# Patient Record
Sex: MALE | Race: WHITE | Employment: OTHER | URBAN - METROPOLITAN AREA
[De-identification: names, ages, dates, MRNs, and addresses within clinical notes are randomized per-mention and may not be internally consistent; named-entity substitution may affect disease eponyms.]

---

## 2017-01-09 ENCOUNTER — ALLSCRIPTS OFFICE VISIT (OUTPATIENT)
Dept: OTHER | Facility: OTHER | Age: 67
End: 2017-01-09

## 2017-01-11 ENCOUNTER — GENERIC CONVERSION - ENCOUNTER (OUTPATIENT)
Dept: OTHER | Facility: OTHER | Age: 67
End: 2017-01-11

## 2017-02-22 ENCOUNTER — APPOINTMENT (OUTPATIENT)
Dept: LAB | Facility: HOSPITAL | Age: 67
End: 2017-02-22
Payer: COMMERCIAL

## 2017-02-22 ENCOUNTER — GENERIC CONVERSION - ENCOUNTER (OUTPATIENT)
Dept: OTHER | Facility: OTHER | Age: 67
End: 2017-02-22

## 2017-02-22 ENCOUNTER — TRANSCRIBE ORDERS (OUTPATIENT)
Dept: ADMINISTRATIVE | Facility: HOSPITAL | Age: 67
End: 2017-02-22

## 2017-02-22 DIAGNOSIS — K21.9 GASTROESOPHAGEAL REFLUX DISEASE, ESOPHAGITIS PRESENCE NOT SPECIFIED: ICD-10-CM

## 2017-02-22 DIAGNOSIS — K21.9 GASTROESOPHAGEAL REFLUX DISEASE, ESOPHAGITIS PRESENCE NOT SPECIFIED: Primary | ICD-10-CM

## 2017-02-22 PROCEDURE — 82784 ASSAY IGA/IGD/IGG/IGM EACH: CPT

## 2017-02-22 PROCEDURE — 36415 COLL VENOUS BLD VENIPUNCTURE: CPT

## 2017-02-22 PROCEDURE — 86255 FLUORESCENT ANTIBODY SCREEN: CPT

## 2017-02-22 PROCEDURE — 83516 IMMUNOASSAY NONANTIBODY: CPT

## 2017-02-23 LAB
ENDOMYSIUM IGA SER QL: NEGATIVE
GLIADIN PEPTIDE IGA SER-ACNC: 7 UNITS (ref 0–19)
GLIADIN PEPTIDE IGG SER-ACNC: 2 UNITS (ref 0–19)
IGA SERPL-MCNC: 462 MG/DL (ref 61–437)
TTG IGA SER-ACNC: <2 U/ML (ref 0–3)
TTG IGG SER-ACNC: 5 U/ML (ref 0–5)

## 2017-03-08 ENCOUNTER — HOSPITAL ENCOUNTER (EMERGENCY)
Facility: HOSPITAL | Age: 67
Discharge: HOME | End: 2017-03-08
Attending: EMERGENCY MEDICINE | Admitting: EMERGENCY MEDICINE
Payer: COMMERCIAL

## 2017-03-08 ENCOUNTER — APPOINTMENT (EMERGENCY)
Dept: RADIOLOGY | Facility: HOSPITAL | Age: 67
End: 2017-03-08
Payer: COMMERCIAL

## 2017-03-08 ENCOUNTER — APPOINTMENT (EMERGENCY)
Dept: CT IMAGING | Facility: HOSPITAL | Age: 67
End: 2017-03-08
Payer: COMMERCIAL

## 2017-03-08 VITALS
SYSTOLIC BLOOD PRESSURE: 140 MMHG | TEMPERATURE: 98.3 F | HEART RATE: 72 BPM | BODY MASS INDEX: 35.67 KG/M2 | RESPIRATION RATE: 18 BRPM | DIASTOLIC BLOOD PRESSURE: 65 MMHG | WEIGHT: 195 LBS | OXYGEN SATURATION: 97 %

## 2017-03-08 DIAGNOSIS — M79.642 HAND PAIN, LEFT: ICD-10-CM

## 2017-03-08 DIAGNOSIS — M25.569 ACUTE KNEE PAIN: ICD-10-CM

## 2017-03-08 DIAGNOSIS — S16.1XXA CERVICAL STRAIN, ACUTE: Primary | ICD-10-CM

## 2017-03-08 DIAGNOSIS — M79.641 HAND PAIN, RIGHT: ICD-10-CM

## 2017-03-08 DIAGNOSIS — M25.559 ACUTE HIP PAIN: ICD-10-CM

## 2017-03-08 DIAGNOSIS — G44.319 ACUTE POST-TRAUMATIC HEADACHE: ICD-10-CM

## 2017-03-08 PROCEDURE — 73564 X-RAY EXAM KNEE 4 OR MORE: CPT

## 2017-03-08 PROCEDURE — 73130 X-RAY EXAM OF HAND: CPT

## 2017-03-08 PROCEDURE — 70450 CT HEAD/BRAIN W/O DYE: CPT

## 2017-03-08 PROCEDURE — A9270 NON-COVERED ITEM OR SERVICE: HCPCS

## 2017-03-08 PROCEDURE — 99284 EMERGENCY DEPT VISIT MOD MDM: CPT

## 2017-03-08 PROCEDURE — 73502 X-RAY EXAM HIP UNI 2-3 VIEWS: CPT

## 2017-03-08 PROCEDURE — 72125 CT NECK SPINE W/O DYE: CPT

## 2017-03-08 RX ORDER — FUROSEMIDE 20 MG/1
40 TABLET ORAL DAILY
COMMUNITY
End: 2021-06-02 | Stop reason: DRUGHIGH

## 2017-03-08 RX ORDER — IBUPROFEN 200 MG
400 TABLET ORAL ONCE
Status: COMPLETED | OUTPATIENT
Start: 2017-03-08 | End: 2017-03-08

## 2017-03-08 RX ORDER — POTASSIUM CHLORIDE 750 MG/1
10 TABLET, EXTENDED RELEASE ORAL DAILY
Status: ON HOLD | COMMUNITY
End: 2018-10-21 | Stop reason: ALTCHOICE

## 2017-03-08 RX ADMIN — IBUPROFEN 400 MG: 200 TABLET, FILM COATED ORAL at 13:29

## 2017-03-15 DIAGNOSIS — G35 MULTIPLE SCLEROSIS (HCC): ICD-10-CM

## 2017-03-31 ENCOUNTER — GENERIC CONVERSION - ENCOUNTER (OUTPATIENT)
Dept: OTHER | Facility: OTHER | Age: 67
End: 2017-03-31

## 2017-05-19 ENCOUNTER — ALLSCRIPTS OFFICE VISIT (OUTPATIENT)
Dept: OTHER | Facility: OTHER | Age: 67
End: 2017-05-19

## 2017-05-23 ENCOUNTER — ALLSCRIPTS OFFICE VISIT (OUTPATIENT)
Dept: OTHER | Facility: OTHER | Age: 67
End: 2017-05-23

## 2017-08-21 ENCOUNTER — GENERIC CONVERSION - ENCOUNTER (OUTPATIENT)
Dept: OTHER | Facility: OTHER | Age: 67
End: 2017-08-21

## 2017-09-23 ENCOUNTER — HOSPITAL ENCOUNTER (EMERGENCY)
Facility: HOSPITAL | Age: 67
Discharge: HOME | End: 2017-09-23
Attending: EMERGENCY MEDICINE
Payer: COMMERCIAL

## 2017-09-23 VITALS
DIASTOLIC BLOOD PRESSURE: 70 MMHG | TEMPERATURE: 98.5 F | RESPIRATION RATE: 18 BRPM | SYSTOLIC BLOOD PRESSURE: 164 MMHG | OXYGEN SATURATION: 95 % | HEART RATE: 93 BPM

## 2017-09-23 DIAGNOSIS — Z76.89 ENCOUNTER FOR EVALUATION OF FOLEY CATHETER: Primary | ICD-10-CM

## 2017-09-23 PROCEDURE — 99283 EMERGENCY DEPT VISIT LOW MDM: CPT

## 2017-09-23 NOTE — ED ATTENDING ATTESTATION
Leah Coffman MD, saw and evaluated the patient  I have discussed the patient with the resident/non-physician practitioner and agree with the resident's/non-physician practitioner's findings, Plan of Care, and MDM as documented in the resident's/non-physician practitioner's note, except where noted  All available labs and Radiology studies were reviewed  At this point I agree with the current assessment done in the Emergency Department  I have conducted an independent evaluation of this patient a history and physical is as follows:    74 YO male with Hx of MS presents with no output from his suprapubic catheter today  States he has noticed some abdominal fullness, mild  Pt states his catheter was replaced today by visiting nursing, this still did not drain  On arrival pt was noted to have a draining catheter  Pt denies CP/SOB/F/C/N/V/D/C, no dysuria, burning on urination or blood in urine  Gen: Pt is in NAD  HEENT: Head is atraumatic, EOM's intact, neck has FROM  Chest: CTAB, non-tender  Heart: RRR  Abdomen: Soft, suprapubic catheter in place, no surrounding erythema  Musculoskeletal: FROM in all extremities  Skin: No rash, no ecchymosis  Neuro: Awake, alert, oriented x4; Cranial nerves II-XII intact  Psych: Normal affect    MDM - Pt with neurogenic bladder, decreased output which seems to have resolved by arrival  Pt will be discharged without further evaluation, he has been in tough with his urologist prior to arrival, will follow up      Critical Care Time  CritCare Time

## 2017-09-23 NOTE — ED PROVIDER NOTES
History  Chief Complaint   Patient presents with    Urinary Catheter Problem     states woke this am no output from suprapubic cath states "feels drowsy" denies other sx  18-year-old male presenting with concern for nonfunctioning suprapubic catheter  Patient has had a suprapubic catheter for the past 2-2 5 years  This was placed for history of urinary retention  Patient reports that today he was attempting to drain it and no urine was coming out  Visiting nurse exchange the catheter today, yet there was still no urine output  This is a prompted the patient to come to the ED  He denies any abdominal pain or fullness  Denies any fevers, chills, CP, SOB, nausea, vomiting, changes in stool  He reports that he was last functioning yesterday in urine appeared normal in color and no malodor  Patient's exam was unremarkable  He was bladder scan showing that he had around 300 cc of urine in his bladder  Patient then drained the catheter with 300 cc of clear yellow urine  Patient has no other concerns and has already contacted Urology for follow-up  He lives at home with family who were present at bedside  A/P:  43-year-old male with concern for nonfunctioning suprapubic catheter, however in ED this was functional             5/19/17 Urology [facility])  multiple sclerosis, urinary retention, status post TURP, bladder neck contracture, bladder stones  Although there were some bladder stones identified, the patient is asymptomatic without signs of UTI or hematuria  We discussed risk and benefits of cystolitholapaxy  This would require dilation of his bladder neck contracture which could contribute to incontinence  I recommend that visiting nurses change his suprapubic tube every 4-6 weeks  I'll will be in one year for cystoscopy and suprapubic tube change or sooner if he were to develop UTIs or hematuria which could indicate growth of his bladder stones         Prior to Admission Medications   Prescriptions Last Dose Informant Patient Reported? Taking? amLODIPine (NORVASC) 10 mg tablet   Yes No   Sig: Take 10 mg by mouth daily  aspirin 325 mg tablet   Yes No   Sig: Take 325 mg by mouth daily  atorvastatin (LIPITOR) 20 mg tablet   Yes No   Sig: Take 20 mg by mouth daily at bedtime  baclofen 10 mg tablet   Yes No   Sig: Take 2 5 mg by mouth daily     furosemide (LASIX) 20 mg tablet   Yes No   Sig: Take 20 mg by mouth 2 (two) times a day   gabapentin (NEURONTIN) 300 mg capsule   Yes No   Sig: Take 300 mg by mouth 3 (three) times a day  losartan (COZAAR) 50 mg tablet   Yes No   Sig: Take 50 mg by mouth daily  metFORMIN (GLUCOPHAGE) 500 mg tablet   Yes No   Sig: Take 500 mg by mouth 2 (two) times a day with meals     pantoprazole (PROTONIX) 40 mg tablet   Yes No   Sig: Take 40 mg by mouth 2 (two) times a day  potassium chloride (K-DUR,KLOR-CON) 10 mEq tablet   Yes No   Sig: Take 10 mEq by mouth daily   propranolol (INDERAL) 60 mg tablet   Yes No   Sig: Take 60 mg by mouth daily  sucralfate (CARAFATE) 1 g tablet   Yes No   Sig: Take 1 g by mouth 2 (two) times a day  Facility-Administered Medications: None       Past Medical History:   Diagnosis Date    Arthritis     Diabetes mellitus     Glaucoma     Hiatal hernia     Hypertension     MS (multiple sclerosis)     Spinal stenosis        Past Surgical History:   Procedure Laterality Date    APPENDECTOMY      BRAIN SURGERY      SUPRAPUBIC CATHETER INSERTION         History reviewed  No pertinent family history  I have reviewed and agree with the history as documented  Social History   Substance Use Topics    Smoking status: Former Smoker     Types: Cigarettes    Smokeless tobacco: Never Used      Comment: smoked for 15-20 years, stopped about 25 years ago    Alcohol use No        Review of Systems   Constitutional: Negative for chills and fever  Respiratory: Negative for cough and shortness of breath      Cardiovascular: Negative for chest pain and leg swelling  Gastrointestinal: Negative for abdominal pain, constipation, diarrhea, nausea and vomiting  Genitourinary: Positive for decreased urine volume and difficulty urinating  Negative for flank pain and hematuria  Musculoskeletal: Negative for back pain and neck pain  Skin: Negative for color change and rash  Allergic/Immunologic: Negative for environmental allergies and immunocompromised state  Neurological: Negative for light-headedness and headaches  All other systems reviewed and are negative  Physical Exam  ED Triage Vitals [09/23/17 1802]   Temperature Pulse Respirations Blood Pressure SpO2   98 5 °F (36 9 °C) 93 18 164/70 95 %      Temp Source Heart Rate Source Patient Position - Orthostatic VS BP Location FiO2 (%)   Temporal -- Sitting Left arm --      Pain Score       No Pain           Physical Exam   Constitutional: He is oriented to person, place, and time  He appears well-developed and well-nourished  HENT:   Head: Normocephalic and atraumatic  Nose: Nose normal    Mouth/Throat: Oropharynx is clear and moist    Neck: Normal range of motion  Neck supple  Cardiovascular: Normal rate and regular rhythm  Pulmonary/Chest: Effort normal and breath sounds normal    Abdominal: Soft  He exhibits no distension  There is no tenderness  There is no rebound and no guarding  No ttp over bladder/does not feel distended  Suprapubic dela cruz catheter in place with normal stoma, no surrounding skin changes/drainage  Musculoskeletal: He exhibits no edema or deformity  Neurological: He is alert and oriented to person, place, and time  Coordination normal    Skin: Skin is warm and dry  Psychiatric: He has a normal mood and affect  His behavior is normal    Nursing note and vitals reviewed        ED Medications  Medications - No data to display    Diagnostic Studies  Labs Reviewed - No data to display    No orders to display       Procedures  Procedures      Phone Consults  ED Phone Contact    ED Course  ED Course                              MDM  Number of Diagnoses or Management Options  Encounter for evaluation of Dela Cruz catheter:   Diagnosis management comments: 74 yo M presenting with concern for nonfunctioning suprapubic dela cruz, this was changed by visiting nurses today and was functional without issue in ED  - strict return precautions discussed  - Urology follow up        Amount and/or Complexity of Data Reviewed  Review and summarize past medical records: yes      CritCare Time    Disposition  Final diagnoses:   Encounter for evaluation of Dela Cruz catheter     ED Disposition     ED Disposition Condition Comment    Discharge  Yael Caldwell discharge to home/self care  Condition at discharge: Stable        Follow-up Information     Follow up With Specialties Details Why [address],  Family Medicine   [address] [facility]      J Luis Card MD Urology Schedule an appointment as soon as possible for a visit Return to ED if you have new or worsening symptoms  [address] [place] [address]  [phone]          Discharge Medication List as of 9/23/2017  6:38 PM      CONTINUE these medications which have NOT CHANGED    Details   amLODIPine (NORVASC) 10 mg tablet Take 10 mg by mouth daily  , Until Discontinued, Historical Med      aspirin 325 mg tablet Take 325 mg by mouth daily  , Until Discontinued, Historical Med      atorvastatin (LIPITOR) 20 mg tablet Take 20 mg by mouth daily at bedtime  , Until Discontinued, Historical Med      baclofen 10 mg tablet Take 2 5 mg by mouth daily  , Until Discontinued, Historical Med      furosemide (LASIX) 20 mg tablet Take 20 mg by mouth 2 (two) times a day, Until Discontinued, Historical Med      gabapentin (NEURONTIN) 300 mg capsule Take 300 mg by mouth 3 (three) times a day   , Until Discontinued, Historical Med      losartan (COZAAR) 50 mg tablet Take 50 mg by mouth daily  , Until Discontinued, Historical Med      metFORMIN (GLUCOPHAGE) 500 mg tablet Take 500 mg by mouth 2 (two) times a day with meals  , Until Discontinued, Historical Med      pantoprazole (PROTONIX) 40 mg tablet Take 40 mg by mouth 2 (two) times a day , Until Discontinued, Historical Med      potassium chloride (K-DUR,KLOR-CON) 10 mEq tablet Take 10 mEq by mouth daily, Until Discontinued, Historical Med      propranolol (INDERAL) 60 mg tablet Take 60 mg by mouth daily  , Until Discontinued, Historical Med      sucralfate (CARAFATE) 1 g tablet Take 1 g by mouth 2 (two) times a day , Until Discontinued, Historical Med           No discharge procedures on file  ED Provider  Attending physically available and evaluated Brettjoana Pierre MAYFIELD managed the patient along with the ED Attending      Electronically Signed by       Helene Jacobs DO  Resident  09/24/17 3081

## 2017-09-23 NOTE — DISCHARGE INSTRUCTIONS
How to Care for Your Suprapubic Catheter   WHAT YOU NEED TO KNOW:   A suprapubic catheter is a sterile (germ-free) tube that drains urine out of your bladder  It is inserted through a stoma (created opening) in your abdomen and into the bladder  The catheter has a small balloon filled with solution that holds the catheter inside your bladder  Suprapubic catheters are used when you have problems urinating because of a medical condition  A suprapubic catheter is also called an indwelling urinary catheter  DISCHARGE INSTRUCTIONS:   Catheter problem signs:  Know the signs of problems related to your catheter:  · Lower abdomen pain:  This can be a sign of infection  You may also have pain if the catheter is in the wrong place  · Urine leaking from your stoma or urethra:  Wet clothes or a wet bed may be signs that your catheter is not draining as it should  You may get some leaking of urine from your stoma or urethra if you have bladder spasms  A lot of urine leaking is not normal  The catheter may be blocked or in the wrong position  The drainage tubing may be blocked or kinked  Leaking urine can also be a sign of infection  · No urine draining from the catheter:  No urine drainage for 6 to 8 hours is a sign that your catheter is not working properly  Pain or fullness in you lower abdomen can also be signs of catheter blockage or infection  You may also feel restless  If you have any of these signs, do the following:     ¨ Check to see if the urine tubing is twisted or bent or if you are lying on the catheter or tubing  ¨ Make sure the urine bag and tubing are located below the level of your waist    ¨ Move to a different position  ¨ Contact your healthcare provider immediately if there is still no urine draining or if you continue to have pain or fullness or feel restless  Suprapubic catheter change problems:  Know what to do if you have any of these problems:  · Unplanned catheter change:   Your catheter may accidently fall out or be pulled out  You or a person who helps care for you will need to learn how to put in a new catheter  This must be done right away  Your stoma can start to close up quickly if it does not have a catheter in it  · Old catheter does not come out easily:  Some types of catheter balloons get ridges on them or may hold their inflated shape after the water is removed  You may need a different type of catheter if this happens  A catheter that does not come out easily can damage your stoma and increase your risk for infection  Tell your healthcare provider if you have problems with removing your old catheter  · Not able to insert the new catheter: If you have trouble, cover the stoma with a sterile bandage and call your healthcare provider right away  · New catheter balloon in the wrong place:  A catheter balloon that is in the wrong place may cause pain when you try to fill it  ¨ Not inserted deep enough: This can cause pain and may damage your stoma if the catheter balloon is in the stoma when the balloon is filled  Damage to your stoma can make inserting a new catheter harder or lead to an infection  Insert more of the catheter and try to fill the balloon again  ¨ Inserted too deep:  A catheter that is inserted too far into your bladder can pass into your urethra  This can cause pain and leaking urine when the balloon is filled  In women the end of the new catheter may poke out of the urethra  Your catheter will not drain urine as it should if this happens and may damage your urethra  Pull the catheter back several inches and try to fill the balloon again  Tell your healthcare provider if this happens  Prevent infections:  Urinary catheter-based infections are common and can lead to serious illness and death  Proper care and cleaning of the catheter, the insertion site, and the urine drainage bag can help prevent infection   The following are ways you can help prevent catheter-based infections:  · Drinking liquids:  Adults should drink about 9 to 13 cups of liquid each day  One cup is 8 ounces  Good choices of liquids for most people include water, juice, and milk  Coffee, soup, and fruit may be counted in your daily liquid amount  Ask your caregiver how much liquid you should drink each day  · Good hand hygiene is the best way to prevent infections   Always wash your hands with soap and water before and after you touch your catheter, tubing, or drainage bag  Do this to remove germs on your hands before you touch these items  Do this after you touch these items to remove germs that may have been on them  Wear clean medical gloves when you care for your catheter or disconnect the drainage bag  This will help stop germs from getting into your catheter  Remind anyone who cares for your catheter or drainage system to wash his or her hands  · Ask your healthcare provider when your catheter will be removed or replaced with a new one  Your risk for infection is greater the longer you have a catheter  · Always do proper care and cleaning of the catheter, its insertion site, and the drainage bag  · Keep the catheter drainage system closed  · Keep the catheter tube secured to your skin or leg so that it drains well  Prevent drainage bag problems:   · Use good hand hygiene:  Keep your hands clean and as free of germs as possible  Always wash your hands before and after you touch the catheter or the insertion site  Wear clean medical gloves when you care for your catheter  · Allow gravity drainage and position the drainage bag properly:  Do not loop or kink the tubing so urine can flow out  Keep the drainage bag below the level of your waist  This helps stop urine from moving back up the tubing and into your bladder  · Keep the bag off the floor:  Do not let the drainage bag touch or lie on the floor  · Empty the drainage bag when needed:   The weight of a full drainage bag can pull on and hurt your stoma  Empty the drainage bag every 3 to 6 hours or when it is ½ to ? full  · Clean and change the drainage bag as directed:  Ask your healthcare provider how often you should change the drainage bag  You may buy a special solution to clean the drainage bag, or you may make a solution with household bleach or vinegar and tap water  Wear medical gloves if you must disconnect the tubing  Do not allow the end of the catheter or tubing to touch anything  Clean the ends with a new alcohol pad or as directed before you reconnect them  Prevent catheter and stoma problems:   · Stoma site care:  Clean the skin around your stoma every day or as directed by your healthcare provider  Keep the area clean and dry to prevent skin problems  Look for redness, skin injuries, red spots, drainage, and swelling  Report any skin changes to your healthcare provider  · Know how far to insert your new catheter:  Know how far to insert the new catheter to prevent it from being in the stoma or urethra  Check the catheter position if you have discomfort or pain when you fill the catheter balloon  · Prevent stoma damage or loss of stoma:  Tell your healthcare provider if you have problems removing a catheter  A catheter that does not come out easily can damage your stoma and increases your risk for infection  You may need a different type of catheter  Your stoma can start to close off quickly if you wait longer than 5 to 10 minutes to insert a new one  Make sure you always have enough supplies to be able to change your catheter  Always keep an extra catheter with you  Healthcare providers may be able to save your stoma if you seek care immediately   · Prevent blockage of catheter or tubing:  Signs that your catheter or tubing is blocked or kinked include urine leaking from your stoma or urethra or urine not draining at all  Your risk for infection increases if the tube is blocked  Keep the tubing in a straight line when you hang your drainage bag to prevent it from getting blocked  · Secure your catheter well:  Catheters that are not secured can pull at the insertion site  This causes the stoma to get bigger and urine may start to leak out of the stoma  Make sure the device holding your catheter does not block the tubing  Change how you secure the catheter if you develop an overgrowth of skin at the insertion site  Secure the catheter in a different direction than usual, or secure the drainage bag to your other leg  Take your medicine as directed  Contact your healthcare provider if you think your medicine is not helping or if you have side effects  Tell him of her if you are allergic to any medicine  Keep a list of the medicines, vitamins, and herbs you take  Include the amounts, and when and why you take them  Bring the list or the pill bottles to follow-up visits  Carry your medicine list with you in case of an emergency  Follow up with your healthcare provider as directed:  Write down your questions so you remember to ask them during your visits  Contact your healthcare provider if:   · You have a fever  · You have changes in how your urine looks or smells, or you have blood in your urine  · You have overgrowth of skin at the insertion site that is getting larger  · Urine keeps draining out of the catheter insertion site or from your urethra  · There is less urine than usual or no urine draining into the drainage bag  · The closed drainage system has accidently come open or apart  · Your catheter keeps getting blocked  · Your catheter came out and you have replaced it with a new one  Return to the emergency department if:   · Your catheter becomes blocked and you cannot reach your healthcare provider  · Your catheter comes out and you cannot replace it yourself      · Your insertion site is red, has green or yellow discharge, smells bad, or is bleeding more than usual     · You have pain in your hip, back, pelvis, or lower abdomen  · You are confused or have other changes in the way that you think  © 2017 [employer] [employer] Information is for End User's use only and may not be sold, redistributed or otherwise used for commercial purposes  All illustrations and images included in CareNotes® are the copyrighted property of [employer]  or [employer] Marquez  The above information is an  only  It is not intended as medical advice for individual conditions or treatments  Talk to your doctor, nurse or pharmacist before following any medical regimen to see if it is safe and effective for you

## 2017-10-25 ENCOUNTER — LAB REQUISITION (OUTPATIENT)
Dept: LAB | Facility: HOSPITAL | Age: 67
End: 2017-10-25
Payer: COMMERCIAL

## 2017-10-25 ENCOUNTER — ALLSCRIPTS OFFICE VISIT (OUTPATIENT)
Dept: OTHER | Facility: OTHER | Age: 67
End: 2017-10-25

## 2017-10-25 DIAGNOSIS — E11.9 TYPE 2 DIABETES MELLITUS WITHOUT COMPLICATIONS (HCC): ICD-10-CM

## 2017-10-25 LAB
ALBUMIN SERPL BCP-MCNC: 3.8 G/DL (ref 3.5–5)
ALP SERPL-CCNC: 94 U/L (ref 46–116)
ALT SERPL W P-5'-P-CCNC: 22 U/L (ref 12–78)
ANION GAP SERPL CALCULATED.3IONS-SCNC: 11 MMOL/L (ref 4–13)
AST SERPL W P-5'-P-CCNC: 14 U/L (ref 5–45)
BASOPHILS # BLD AUTO: 0.08 THOUSANDS/ΜL (ref 0–0.1)
BASOPHILS NFR BLD AUTO: 1 % (ref 0–1)
BILIRUB SERPL-MCNC: 0.6 MG/DL (ref 0.2–1)
BUN SERPL-MCNC: 8 MG/DL (ref 5–25)
CALCIUM SERPL-MCNC: 9.5 MG/DL (ref 8.3–10.1)
CHLORIDE SERPL-SCNC: 99 MMOL/L (ref 100–108)
CO2 SERPL-SCNC: 25 MMOL/L (ref 21–32)
CREAT SERPL-MCNC: 0.93 MG/DL (ref 0.6–1.3)
CREAT UR-MCNC: 40.6 MG/DL
EOSINOPHIL # BLD AUTO: 0.34 THOUSAND/ΜL (ref 0–0.61)
EOSINOPHIL NFR BLD AUTO: 3 % (ref 0–6)
ERYTHROCYTE [DISTWIDTH] IN BLOOD BY AUTOMATED COUNT: 13.9 % (ref 11.6–15.1)
EST. AVERAGE GLUCOSE BLD GHB EST-MCNC: 166 MG/DL
GFR SERPL CREATININE-BSD FRML MDRD: 84 ML/MIN/1.73SQ M
GLUCOSE P FAST SERPL-MCNC: 139 MG/DL (ref 65–99)
HBA1C MFR BLD: 7.4 % (ref 4.2–6.3)
HCT VFR BLD AUTO: 44.5 % (ref 36.5–49.3)
HGB BLD-MCNC: 15.6 G/DL (ref 12–17)
LYMPHOCYTES # BLD AUTO: 2.92 THOUSANDS/ΜL (ref 0.6–4.47)
LYMPHOCYTES NFR BLD AUTO: 26 % (ref 14–44)
MCH RBC QN AUTO: 29.8 PG (ref 26.8–34.3)
MCHC RBC AUTO-ENTMCNC: 35.1 G/DL (ref 31.4–37.4)
MCV RBC AUTO: 85 FL (ref 82–98)
MICROALBUMIN UR-MCNC: 8.1 MG/L (ref 0–20)
MICROALBUMIN/CREAT 24H UR: 20 MG/G CREATININE (ref 0–30)
MONOCYTES # BLD AUTO: 0.97 THOUSAND/ΜL (ref 0.17–1.22)
MONOCYTES NFR BLD AUTO: 9 % (ref 4–12)
NEUTROPHILS # BLD AUTO: 6.8 THOUSANDS/ΜL (ref 1.85–7.62)
NEUTS SEG NFR BLD AUTO: 61 % (ref 43–75)
NRBC BLD AUTO-RTO: 0 /100 WBCS
PLATELET # BLD AUTO: 386 THOUSANDS/UL (ref 149–390)
PMV BLD AUTO: 8.9 FL (ref 8.9–12.7)
POTASSIUM SERPL-SCNC: 4 MMOL/L (ref 3.5–5.3)
PROT SERPL-MCNC: 7.6 G/DL (ref 6.4–8.2)
RBC # BLD AUTO: 5.24 MILLION/UL (ref 3.88–5.62)
SODIUM SERPL-SCNC: 135 MMOL/L (ref 136–145)
WBC # BLD AUTO: 11.14 THOUSAND/UL (ref 4.31–10.16)

## 2017-10-25 PROCEDURE — 85025 COMPLETE CBC W/AUTO DIFF WBC: CPT | Performed by: FAMILY MEDICINE

## 2017-10-25 PROCEDURE — 82570 ASSAY OF URINE CREATININE: CPT | Performed by: FAMILY MEDICINE

## 2017-10-25 PROCEDURE — 82043 UR ALBUMIN QUANTITATIVE: CPT | Performed by: FAMILY MEDICINE

## 2017-10-25 PROCEDURE — 83036 HEMOGLOBIN GLYCOSYLATED A1C: CPT | Performed by: FAMILY MEDICINE

## 2017-10-25 PROCEDURE — 80053 COMPREHEN METABOLIC PANEL: CPT | Performed by: FAMILY MEDICINE

## 2017-10-26 NOTE — PROGRESS NOTES
Assessment  1  Multiple sclerosis (340) (G35)   2  Lower extremity weakness (729 89) (R29 898)   3  Aneurysm (442 9) (I72 9)   4  Type 2 diabetes mellitus (250 00) (E11 9)    Plan  Depression    · ALPRAZolam 0 25 MG Oral Tablet; TAKE 1 TABLET 3 TIMES DAILY FOR  ANXIETY  Health Maintenance, Type 2 diabetes mellitus    · Fluzone High-Dose 0 5 ML Intramuscular Suspension Prefilled Syringe;  INJECT 0 5  ML Intramuscular; To Be Done: [id number]  Hypertension    · Losartan Potassium 50 MG Oral Tablet; TAKE 1 TABLET DAILY  Lower extremity weakness, Multiple sclerosis    · Physical Therapy Home Safety Evaluation and Strengthening Exercises, PT OT Evaluate  and Treat Co-Management  *  Status: Active  Requested for: 34VVZ1210  Care Summary provided  : Yes  PMH: Diabetes Mellitus    · OneTouch UltraSoft Lancets Miscellaneous; TEST TWICE A DAY  Type 2 diabetes mellitus    · OneTouch Ultra Blue In [employer] Strip; test 3 times daily   · (1) CBC/PLT/DIFF; Status:Hold For - Exact Date; Requested for: In Office Collection;    · (1) COMPREHENSIVE METABOLIC PANEL; Status:Hold For - Exact Date; Requested  for: In Office Collection;    · (1) HEMOGLOBIN A1C; Status:Hold For - Exact Date; Requested for: In Office Collection;    · (1) MICROALBUMIN CREATININE RATIO, RANDOM URINE; Status:Hold For - Exact  Date; Requested for: In [facility]; Discussion/Summary    Recommended home physical therapy  Flu vaccine given today  Await blood test results  Return to office in 4 months for recheck  Sooner if needed  Continue follow-up with neurology  Chief Complaint  pt fell and has been feeling in pain since then and he also feels physically sickwould like flu shot      History of Present Illness  HPI: Patient here with lower extremity weakness  He has history of multiple sclerosis  He states he did have a fall at home several months ago   He does continue to follow with neurologist  He states he is feeling better with physical therapy but has gotten away from doing physical therapy over the last few months  He is only able to do home therapy as he is not able to get transportation out to other physical therapy office  is also due for recheck on diabetes  He is due for recheck on aneurysm in the coming year  Denies any headaches or diplopia  He ambulates at home with a walker but states he is becoming more dependent on his wheelchair  He also is due for flu vaccination today as well  Review of Systems    Constitutional: no fever or chills, feels well, no tiredness, no recent weight loss or weight gain  ENT: no complaints of earache, no loss of hearing, no nosebleeds or nasal discharge, no sore throat or hoarseness  Cardiovascular: no complaints of slow or fast heart rate, no chest pain, no palpitations, no leg claudication or lower extremity edema  Respiratory: no complaints of shortness of breath, no wheezing or cough, no dyspnea on exertion, no orthopnea or PND  Gastrointestinal: no complaints of abdominal pain, no constipation, no nausea or vomiting, no diarrhea or bloody stools  Genitourinary: no complaints of dysuria or incontinence, no hesitancy, no nocturia, no genital lesion, no inadequacy of penile erection  Musculoskeletal: no complaints of arthralgia, no myalgia, no joint swelling or stiffness, no limb pain or swelling  Integumentary: no complaints of skin rash or lesion, no itching or dry skin, no skin wounds  Neurological: no complaints of headache, no confusion, no numbness or tingling, no dizziness or fainting  Active Problems  1  Abdomen Suprapubic Catheter   2  Acute UTI (599 0) (N39 0)   3  Aneurysm (442 9) (I72 9)   4  Atopic dermatitis (691 8) (L20 9)   5  Benign colon polyp (211 3) (K63 5)   6  Benign prostatic hypertrophy (600 00) (N40 0)   7  Bilateral shoulder pain (719 41) (M25 511,M25 512)   8  BPH with obstruction/lower urinary tract symptoms (600 01,599 69) (N40 1,N13 8)   9   Bursitis of left shoulder (726 10) (M75 52)   10  Bursitis of right shoulder (726 10) (M75 51)   11  Cellulitis of right leg without foot (682 6) (L03 115)   12  Cellulitis of right lower leg (682 6) (L03 115)   13  Cervical spinal stenosis (723 0) (M48 02)   14  Depression (311) (F32 9)   15  Diabetic neuropathy (250 60,357 2) (E11 40)   16  Dyslipidemia (272 4) (E78 5)   17  Encounter for long-term (current) use of medications (V58 69) (Z79 899)   18  Esophageal reflux (530 81) (K21 9)   19  Fatigue (780 79) (R53 83)   20  Fatty liver (571 8) (K76 0)   21  Generalized anxiety disorder (300 02) (F41 1)   22  Glaucoma (365 9) (H40 9)   23  Headache (784 0) (R51)   24  Hiatal hernia (553 3) (K44 9)   25  Hyperlipidemia (272 4) (E78 5)   26  Hypertension (401 9) (I10)   27  Influenza (487 1) (J11 1)   28  Lower extremity cellulitis (682 6) (L03 119)   29  Multiple sclerosis (340) (G35)   30  Neuralgia (729 2) (M79 2)   31  Neurogenic bladder (596 54) (N31 9)   32  Obstructive sleep apnea (327 23) (G47 33)   33  Other diseases of vocal cords (478 5) (J38 3)   34  Other spondylosis with myelopathy, cervical region (721 1) (M47 12)   35  Palpitations (785 1) (R00 2)   36  Preoperative cardiovascular examination (V72 81) (Z01 810)   37  Reactive airway disease (493 90) (J45 909)   38  Shortness of breath (786 05) (R06 02)   39  Thyroid nodule (241 0) (E04 1)   40  Tinea corporis (110 5) (B35 4)   41  Type 2 diabetes mellitus (250 00) (E11 9)   42  Urinary retention (788 20) (R33 9)   43  UTI (urinary tract infection) (599 0) (N39 0)   44  Visit for pre-operative examination (V72 84) (Z01 818)   45  Vitamin D deficiency (268 9) (E55 9)    Past Medical History  1  Acute bronchitis (466 0) (J20 9)   2  History of Acute laryngitis (464 00) (J04 0)   3  History of Acute nonsuppurative otitis media, unspecified laterality (381 00) (H65 199)   4  History of Arm weakness (729 89) (R29 898)   5  History of Arthritis (V13 4)   6   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)   7  History of Bronchitis (490) (J40)   8  History of Cough (786 2) (R05)   9  History of Diabetes Mellitus (250 00)   10  History of Dysfunction of Eustachian tube, unspecified laterality (381 81) (H69 80)   11  History of Erectile dysfunction of non-organic origin (302 72) (F52 21)   12  History of acute bronchitis (V12 69) (Z87 09)   13  History of bladder infections (V13 02) (Z87 440)   14  History of constipation (V12 79) (Z87 19)   15  History of dizziness (V13 89) (Z87 898)   16  History of fatigue (V13 89) (Z87 898)   17  History of fatigue (V13 89) (Z87 898)   18  History of fatigue (V13 89) (Z87 898)   19  History of Imbalance (781 2) (R26 89)   20  History of Leg muscle spasm (728 85) (M62 838)   21  History of Memory problem (780 93) (R41 3)   22  History of Nephrolithiasis (V13 01)   23  History of No natural teeth (520 0) (K00 0)   24  History of Sinus pain (478 19) (J34 89)   25  History of Strain of thoracic region (847 1) (S29 019A)   26  History of Wears eyeglasses (V49 89) (Z97 3)  Active Problems And Past Medical History Reviewed: The active problems and past medical history were reviewed and updated today  Family History  Mother    1  Family history of Coronary Artery Disease (V17 49)   2  Family history of    3  Family history of Heart problem   4  Family history of Macular Degeneration   5  Family history of Type 2 Diabetes Mellitus  Father    6  Family history of    7  Family history of Heart problem  Sister    8  Family history of Brain aneurysm   9  Family history of Malignant Pancreatic Neoplasm  Maternal Grandmother    10  Family history of Cancer   11  Family history of   Paternal Grandmother    15  Family history of   Maternal Grandfather    15  Family history of   Paternal Grandfather    15   Family history of     Social History   · Assistive Devices: Wheelchair   · Drinks coffee   · Education Level: Less than high school   · Former smoker (Y07 27) (Z83 427)   · Quit smoking 35 yrs ago (1979)   Had started smoking at the age of 12, a pack to a pack      and a half of cigarettes a day  · Functioning activity level   · does not participate in activities outside of the home; participates in sedentary/light      activities inside of the home   · Lives with relatives   · Marital History - Single   · Never Drank Alcohol   · No alcohol use   · No caffeine use   · No drug use   · Not currently employed   · Single  The social history was reviewed and updated today  The social history was reviewed and is unchanged  Surgical History  1  History of Diagnostic Cystoscopy   2  History of Diagnostic Cystoscopy   3  History of Myringotomy - With Ventilating Tube Insertion   4  History of Transscleral Cyclophotocoagulation Noncontact YAG Laser  Surgical History Reviewed: The surgical history was reviewed and updated today  Current Meds   1  ALPRAZolam 0 25 MG Oral Tablet; TAKE 1 TABLET 3 TIMES DAILY FOR ANXIETY; Last   ZB:[id number] Ordered   2  AmLODIPine Besylate 10 MG Oral Tablet; TAKE 1 TABLET DAILY; Therapy: 89GNX3816 to (Evaluate:07Dec2017)  Requested for: 74Grm0961; Last   Rx:12Iuh0288 Ordered   3  Aspirin  MG Oral Tablet Delayed Release; Take 1 tablet daily; Therapy: 30AQE1505 to (Evaluate:21Jan2018)  Requested for: 40Mod0916; Last   Rx:80Ciy3380 Ordered   4  Atorvastatin Calcium 20 MG Oral Tablet; TAKE 1 TABLET DAILY FOR CHOLESTEROL; Therapy: 88Xry6860 to (Evaluate:02Nov2017)  Requested for: 81AWY4295; Last   Rx:07Nov2016 Ordered   5  Baclofen 10 MG Oral Tablet; TAKE 0 5 TABLET DAILY AT 2PM AND MAY TAKE   ADDITIONAL 0 5 TABLET DAILYAS NEEDED; Therapy: 16YDG2331 to (Evaluate:12Mar2017)  Requested for: 15Xjp3830; Last   Rx:19Hts5280 Ordered   6  Furosemide 40 MG Oral Tablet; TAKE 1 TABLET DAILY  Requested for: 69Yod5663; Last   Rx:98Loj1465 Ordered   7   Gabapentin 300 MG Oral Capsule; TAKE 1 CAPSULE 3 TIMES DAILY; Therapy: 38PLD8540 to [phone])  Requested for: 38HXY7264; Last   Rx:17Btn4540 Ordered   8  Losartan Potassium 50 MG Oral Tablet; TAKE 1 TABLET DAILY; Therapy: 01BQK9226 to (Evaluate:09Sep2017)  Requested for: 82Lwe2597; Last   Rx:67Vcw6470 Ordered   9  MetFORMIN HCl - 500 MG Oral Tablet; TAKE 1 TABLET twice a day with meals    Requested for: 21Jun2017; Last Rx:21Jun2017 Ordered   10  OneTouch Ultra 2 w/Device Kit; Therapy: 60ILN1489 to (Last Rx:15Mar2011)  Requested for: 36PBR4588 Ordered   11  OneTouch Ultra Blue In [employer]; test 3 times daily; Therapy: 55OAY2297 to (Zoeas Litter)  Requested for: 10Aug2017; Last    Rx:10Aug2017 Ordered   12  OneTouch UltraSoft Lancets Miscellaneous; TEST TWICE A DAY; Therapy: 88GIU0295 to (Last Rx:11Jan2017)  Requested for: 40IXA3232 Ordered   13  Pantoprazole Sodium 40 MG Oral Tablet Delayed Release; TAKE 1 TABLET TWICE    DAILY 30 MINUTES BEFORE BREAKFAST AND DINNER  Requested for: 06Vuv9045;    Last Rx:67Cxu5667 Ordered   14  Propranolol HCl ER 60 MG Oral Capsule Extended Release 24 Hour; take 1 capsule    daily; Therapy: 91AOJ5190 to (Evaluate:11Mar2018)  Requested for: 70OMU7917; Last    Rx:17Mar2017 Ordered    The medication list was reviewed and updated today  Allergies  1  No Known Drug Allergies  Denied    2  Anesthesia Extension Tubing Miscellaneous    Vitals   Recorded: 10QYD9031 12:12PM   Temperature 08 0 F   Systolic 580   Diastolic 78   Height Unobtainable Yes   Weight Unobtainable Yes     Physical Exam    Constitutional   General appearance: No acute distress, well appearing and well nourished  Eyes   Conjunctiva and lids: No swelling, erythema, or discharge  Pupils and irises: Equal, round and reactive to light  Ears, Nose, Mouth, and Throat   External inspection of ears and nose: Normal     Otoscopic examination: Tympanic membrance translucent with normal light reflex   Canals patent without erythema  Nasal mucosa, septum, and turbinates: Normal without edema or erythema  Oropharynx: Normal with no erythema, edema, exudate or lesions  Pulmonary   Respiratory effort: No increased work of breathing or signs of respiratory distress  Auscultation of lungs: Clear to auscultation, equal breath sounds bilaterally, no wheezes, no rales, no rhonci  Cardiovascular   Palpation of heart: Normal PMI, no thrills  Auscultation of heart: Normal rate and rhythm, normal S1 and S2, without murmurs  Examination of extremities for edema and/or varicosities: Normal     Carotid pulses: Normal     Abdomen   Abdomen: Non-tender, no masses  Liver and spleen: No hepatomegaly or splenomegaly  Lymphatic   Palpation of lymph nodes in neck: No lymphadenopathy  Musculoskeletal   Gait and station: Normal     Digits and nails: Normal without clubbing or cyanosis  Inspection/palpation of joints, bones, and muscles: Normal     Skin   Skin and subcutaneous tissue: Normal without rashes or lesions  Neurologic   Cranial nerves: Cranial nerves 2-12 intact  Reflexes: 2+ and symmetric  Sensation: No sensory loss  Psychiatric   Orientation to person, place and time: Normal     Mood and affect: Normal          Future Appointments    Date/Time Provider Specialty Site   11/07/2017 01:30 PM IVORY White   Neurology [facility] [address]   05/21/2018 01:00 PM Shahzad Mitchell MD Urology [address]     Signatures   Electronically signed by : Cheyenne Bullard DO; Oct 25 2017  1:22PM EST                       (Author)

## 2017-10-27 ENCOUNTER — GENERIC CONVERSION - ENCOUNTER (OUTPATIENT)
Dept: OTHER | Facility: OTHER | Age: 67
End: 2017-10-27

## 2017-11-07 ENCOUNTER — ALLSCRIPTS OFFICE VISIT (OUTPATIENT)
Dept: OTHER | Facility: OTHER | Age: 67
End: 2017-11-07

## 2017-11-07 ENCOUNTER — TRANSCRIBE ORDERS (OUTPATIENT)
Dept: ADMINISTRATIVE | Facility: HOSPITAL | Age: 67
End: 2017-11-07

## 2017-11-07 DIAGNOSIS — R29.898 OTHER SYMPTOMS AND SIGNS INVOLVING THE MUSCULOSKELETAL SYSTEM: ICD-10-CM

## 2017-11-07 DIAGNOSIS — I72.9 RUPTURED ANEURYSM OF ARTERY (HCC): Primary | ICD-10-CM

## 2017-11-07 DIAGNOSIS — G35 MULTIPLE SCLEROSIS (HCC): ICD-10-CM

## 2017-11-07 DIAGNOSIS — I72.9 ANEURYSM (HCC): ICD-10-CM

## 2017-11-08 NOTE — PROGRESS NOTES
Assessment  1  Multiple sclerosis (340) (G35)   2  Aneurysm (442 9) (I72 9)   3  Neurogenic bladder (596 54) (N31 9)   4  Lower extremity weakness (729 89) (R29 898)    Plan  Aneurysm    · * MRA AND OR MRV HEAD WO CONTRAST; Status:Need Information - Financial  Authorization; Requested [id number];    Perform:[facility]; KTI:[id number]; Last Updated By:Diana Carter; 2017 2:33:31 PM;Ordered; For:Aneurysm; Ordered By:Elvis Mullins;   · CTA HEAD AND NECK W WO CONTRAST; Status:Voided;    Perform:[facility]; GLU:[id number]; Ordered; For:Aneurysm; Ordered By:Elvis Mullins;   · *1 -  NEUROSURGERY Co-Management  *endovascular evaluation  pt with prior stenting tip of basilar with dr smith  Status: Active   Requested for: 78FQY3113   Ordered; For: Aneurysm; Ordered By: Cassy Ashley Performed:  Due: 40CQR9411; Last Updated By: Jana Hernandez; 2017 2:33:31 PM  Care Summary provided  : Yes  Multiple sclerosis    · (1) BUN; Status:Active; Requested QOR:78PRL4019;    Perform:[facility]; SNT:85KYP4380; Ordered; For:Multiple sclerosis; Ordered By:Elvis Mullins;   · (1) CBC/PLT/DIFF; Status:Active; Requested ZGZ:[id number];    Perform:[facility]; SUT:89VNB5779; Ordered; For:Multiple sclerosis; Ordered By:Elvis Mullins;   · (1) CREATININE; Status:Active; Requested DWS:89CTK3315;    Perform:[facility]; TMJ:08CST1101; Ordered; For:Multiple sclerosis; Ordered By:Elvis Mullins;   · (1) HEPATIC FUNCTION PANEL; Status:Active; Requested BVI:[id number];    Perform:[facility]; IHH:90DWW7627; Ordered; For:Multiple sclerosis; Ordered By:Elvis Mullins;   · * MRI THORACIC SPINE W WO CONTRAST; Status:Need Information - Financial  Authorization; Requested EJF:[id number];    Perform:[facility]; RCQ:16VHR9660; Last Updated By:Diana Carter; 2017 2:33:31 PM;Ordered; For:Multiple sclerosis;  Ordered By:Elvis Mullins;    Discussion/Summary  Discussion Summary:   Pt here for ms followupwith increased lower ext weakness with a fall in bathroom about 1 5 months agonotes some increased heaviness of the legsneeds updated mri t spine with and withoutalso had tip of basilar aneurysm stenting by dr Oscar Son in 2015had images rev in sept 2016 by neurovascular team and dr Karolyn Leong 2016 with question of possible 2 mm focal outpouching arising from the post aspect of the basilar tip at the origin of the left PCA, measuring 2mm in sizeset of imaging was rev by the neurovascular working group and recommended imaging again jan 2018  is due for updated mra head to re eval this procedure sitealso needs a follow up with endovascular neurosurgerydr sydnee and rev best testing would be mra head to compare to the sept 2016 films and also already getting mri t spineto call us 3 days after mris also set up appt with ns for review  Counseling Documentation With Imm: The patient was counseled regarding diagnostic results,-- instructions for management,-- risk factor reductions,-- prognosis,-- patient and family education  total time of encounter was 45 minutes-- and-- greater than 50% minutes was spent counseling  coordination of care with neuro rads and neuro surgery depts  Goals and Barriers: The patient has the current Goals: Call for any new sxsup imaging for ms as well as basilar tip aneurysmabout 3 days after mri t spine as well as mra head  Patient's Capacity to Self-Care: Patient agrees and allows to involve family/caregiver in development of care plan:   Patient Education: Educational resources provided:   Medication SE Review and Pt Understands Tx: Possible side effects of new medications were reviewed with the patient/guardian today  Patient Guardian understands agrees: The treatment plan was reviewed with the patient/guardian  The patient/guardian understands and agrees with the treatment plan      Chief Complaint  Chief Complaint Free Text Note Form: Patient presents today for a neurological follow up for MS        History of Present Illness  HPI: 74 y/o male presents with brother for neurologic follow up, last seen in May 2017  Patient with PMH of MS diagnosed many years ago, DM and HTN, glaucoma  Patient with suprapubic catheter placed by Dr Shannan Worthington  Patient diagnosed with probable MS in the 1960s  Actually unclear diagnosis from time of presentation to presentation to our center in 2014  Patient recalls at age of 11 having chest pain and then numbness of the left toes tingling up the leg to mid chest around T6 and then down the right side in similar distribution to the right foot  Patient had loss of control of bowel and bladder at that time  He had no ability to walk and was in the hospital for over 6 months  He was only able to ambulate with crutches in his 20s, 30s, 40s, etc  Patient still uses [place] crutches as well as a wheelchair to ambulate  Patient was only ever given ACTH in the past  He was never on IMD meds prior to coming to our center in 2014  He was told he may have had a CVA that affected his walking  Labs unremarkable, NMO negative  re rev 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  re rev MRI c-spine did not reveal any lesions,re rev MRI t-spine reveals cord lesion from T3-5; no comparison on file  Patient had last updated MRI c spine done on May 5 2015  Relatively stable appearance of the cervical spine  Mild to mod canal stenosis from c3-c7 inclusive  Minor flattening of the normal signal cord at c4/5 and c3/4  Varying degrees of multi level foraminal stenosis  Pt has been seen by Dr Sylvain Staley for eval of significant c-spine disease  No surgery indicated  Pt with longstanding dizziness; he has strong family history of brain aneurysms- his sister had 3 aneurysms, 1 niece with 3 aneurysms, 1 niece who passed away from aneurysm and 1 nephew with aneurysm; pt's father with aneurysm in his abdominal area  He had CTA in January 2015, 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   He was seen neuro-surg and underwent 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 in March 2015 by Dr Ilene Palma of neuro-surg  At visit in Sept 2016, patient reported an acute increase in blurriness of vision starting 2 weeks prior to the vision  He has had strabismus since youth and diplopia in the past  Our office coordinated with Dr Orren Bamberger and patient was fit in to see him the next day  It was found he was starting to develop cataracts and also had some optic nerve head drusen  Patient just saw optho again last month and optic nerve head drusen seen on B-scan  His pressure was also higher and eye drops were adjusted  MRI and MRA head were ordered due to new visual symptoms  MRI brain 9/19/16 with mild age-appropriate volume loss  Mild white matter disease, stable compared to 12/2014  MRA head 9/19/16 possible 2mm outpouching arising from the posterior aspect of the basilar tip at the origin of the left posterior cerebral artery  This may represent a small residual aneurysm  Consider re-eval with endovascular neurosurgery  Case was discussed at the neurovascular working group  Dr Ilene Palma is no longer with [facility]  Dr Gabriel Mcpherson was able to review the case  He did not suggest any follow up with neurosurgery at this time and to repeat the imaging 2 years from Jan 2016 imaging  Today, patient reports he is overall doing well  He denies any new neurologic symptoms  His main complaints are neck and back pain, joint pain  He has seen pain management in the past but does not want to go back  He has been referred to PT but says he cannot afford the copays  pt is following regularly with his optho  pt is next due in a few weeks in nov before thanksgiving  He reports his vision is stable and he is following with optho every 3-4 months now  Denies changes in bowel or bladder, speech or swallowing  He has not had any falls  He is using his wheelchair  No HA, zoning, LOC  no loss of vision  no change in speech or swallowing   pt did have one fall since last visit while in the bathroom about 1 5 months ago  pt notes some increased heaviness of the legs  pt needs updated mri t spine with and without  pt also had tip of basilar aneurysm stenting by dr Brigido Kellogg in 2015  pt had images rev in sept 2016 by neurovascular team and dr Nohemi Gorman  re rev mra in sept 2016 with question of possible 2 mm focal outpouching arising from the post aspect of the basilar tip at the origin of the left PCA, measuring 2mm in size  this set of imaging was rev by the neurovascular working group and recommended imaging again jan 2018  rec to pt to have done shortly and we will make an appt with dr David Matthew to review any further recommendations for him  pt cont on asa therapy  pt is due for updated mra head to re eval this procedure site  pt also needs a follow up with endovascular neurosurgery  during appt, I called dr Alina Mak and rev best testing would be mra head to compare to the sept 2016 films and also already getting mri t spine  pt to call us 3 days after mris  our staff also worked with ns dept to coordinate a follow up for pt with endovascular specialist  denies any loc  no sz  no change in bowel or bladder  pt has visiting nursing team for his suprapubic catheter  pt notes no new facial paresthesias  no vertigo  Review of Systems  ros rev with pt at appt   Neurological ROS:   Constitutional: fatigue  HEENT: sinus problems,-- blurred vision,-- eye pain-- and-- hoarseness  Cardiovascular: rapid or irregular heart rate  Respiratory: shortness of breath with or without exertion  Gastrointestinal: loss of bowel control  Genitourinary: incontinence  Musculoskeletal: arthralgias,-- myalgias,-- head/neck/back pain-- and-- pain while walking  Integumentary  no masses, no rash, no skin lesions, no livedo reticularis  Psychiatric: anxiety  Endocrine loss of sexual ability or drive -- and-- erection difficulty     Hematologic/Lymphatic:  no unusual bleeding, no tendency for easy bruising, no clotting skin or lumps  Neurological General: headache-- and-- lightheadedness  Neurological Mental Status:  no confusion, no mood swings, no alteration or loss of consciousness, no difficulty expressing/understanding speech, no memory problems  Neurological Cranial Nerves: blurry or double vision-- and-- vertigo or dizziness  Neurological Motor findings include: tremor  Neurological Coordination: balance difficulties  Neurological Sensory: numbness  Neurological Gait: difficulty walking-- and-- has had falls  Active Problems  1  Abdomen Suprapubic Catheter   2  Acute UTI (599 0) (N39 0)   3  Aneurysm (442 9) (I72 9)   4  Atopic dermatitis (691 8) (L20 9)   5  Benign colon polyp (211 3) (K63 5)   6  Benign prostatic hypertrophy (600 00) (N40 0)   7  Bilateral shoulder pain (719 41) (M25 511,M25 512)   8  BPH with obstruction/lower urinary tract symptoms (600 01,599 69) (N40 1,N13 8)   9  Bursitis of left shoulder (726 10) (M75 52)   10  Bursitis of right shoulder (726 10) (M75 51)   11  Cellulitis of right leg without foot (682 6) (L03 115)   12  Cellulitis of right lower leg (682 6) (L03 115)   13  Cervical spinal stenosis (723 0) (M48 02)   14  Depression (311) (F32 9)   15  Diabetic neuropathy (250 60,357 2) (E11 40)   16  Dyslipidemia (272 4) (E78 5)   17  Encounter for long-term (current) use of medications (V58 69) (Z79 899)   18  Esophageal reflux (530 81) (K21 9)   19  Fatigue (780 79) (R53 83)   20  Fatty liver (571 8) (K76 0)   21  Generalized anxiety disorder (300 02) (F41 1)   22  Glaucoma (365 9) (H40 9)   23  Headache (784 0) (R51)   24  Hiatal hernia (553 3) (K44 9)   25  Hyperlipidemia (272 4) (E78 5)   26  Hypertension (401 9) (I10)   27  Influenza (487 1) (J11 1)   28  Lower extremity cellulitis (682 6) (L03 119)   29  Lower extremity weakness (729 89) (R29 898)   30  Multiple sclerosis (340) (G35)   31  Neuralgia (729 2) (M79 2)   32   Neurogenic bladder (596 54) (N31 9)   33  Obstructive sleep apnea (327 23) (G47 33)   34  Other diseases of vocal cords (478 5) (J38 3)   35  Other spondylosis with myelopathy, cervical region (721 1) (M47 12)   36  Palpitations (785 1) (R00 2)   37  Preoperative cardiovascular examination (V72 81) (Z01 810)   38  Reactive airway disease (493 90) (J45 909)   39  Shortness of breath (786 05) (R06 02)   40  Thyroid nodule (241 0) (E04 1)   41  Tinea corporis (110 5) (B35 4)   42  Type 2 diabetes mellitus (250 00) (E11 9)   43  Urinary retention (788 20) (R33 9)   44  UTI (urinary tract infection) (599 0) (N39 0)   45  Visit for pre-operative examination (V72 84) (Z01 818)   46  Vitamin D deficiency (268 9) (E55 9)    Past Medical History  1  Acute bronchitis (466 0) (J20 9)   2  History of Acute laryngitis (464 00) (J04 0)   3  History of Acute nonsuppurative otitis media, unspecified laterality (381 00) (H65 199)   4  History of Arm weakness (729 89) (R29 898)   5  History of Arthritis (V13 4)   6  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)   7  History of Bronchitis (490) (J40)   8  History of Cough (786 2) (R05)   9  History of Diabetes Mellitus (250 00)   10  History of Dysfunction of Eustachian tube, unspecified laterality (381 81) (H69 80)   11  History of Erectile dysfunction of non-organic origin (302 72) (F52 21)   12  History of acute bronchitis (V12 69) (Z87 09)   13  History of bladder infections (V13 02) (Z87 440)   14  History of constipation (V12 79) (Z87 19)   15  History of dizziness (V13 89) (Z87 898)   16  History of fatigue (V13 89) (Z87 898)   17  History of fatigue (V13 89) (Z87 898)   18  History of fatigue (V13 89) (Z87 898)   19  History of Imbalance (781 2) (R26 89)   20  History of Leg muscle spasm (728 85) (M62 838)   21  History of Memory problem (780 93) (R41 3)   22  History of Nephrolithiasis (V13 01)   23  History of No natural teeth (520 0) (K00 0)   24   History of Sinus pain (478 19) (J34 89) 25  History of Strain of thoracic region (847 1) (S29 019A)   26  History of Wears eyeglasses (V49 89) (Z97 3)  Active Problems And Past Medical History Reviewed: The active problems and past medical history were reviewed and updated today  Surgical History  1  History of Diagnostic Cystoscopy   2  History of Diagnostic Cystoscopy   3  History of Myringotomy - With Ventilating Tube Insertion   4  History of Transscleral Cyclophotocoagulation Noncontact YAG Laser  Surgical History Reviewed: The surgical history was reviewed and updated today  Family History  Mother    1  Family history of Coronary Artery Disease (V17 49)   2  Family history of    3  Family history of Heart problem   4  Family history of Macular Degeneration   5  Family history of Type 2 Diabetes Mellitus  Father    6  Family history of    7  Family history of Heart problem  Sister    8  Family history of Brain aneurysm   9  Family history of Malignant Pancreatic Neoplasm  Maternal Grandmother    10  Family history of Cancer   11  Family history of   Paternal Grandmother    15  Family history of   Maternal Grandfather    15  Family history of   Paternal Grandfather    15  Family history of   Family History Reviewed: The family history was reviewed and updated today  Social History   · Assistive Devices: Wheelchair   · Drinks coffee   · Education Level: Less than high school   · Former smoker (K16 38) (Z49 140)   · Functioning activity level   · Lives with relatives   · Marital History - Single   · Never Drank Alcohol   · No alcohol use   · No caffeine use   · No drug use   · Not currently employed   · Single  Social History Reviewed: The social history was reviewed and updated today  Current Meds   1  ALPRAZolam 0 25 MG Oral Tablet; TAKE 1 TABLET 3 TIMES DAILY FOR ANXIETY; Last   Rx:2017 Ordered   2  AmLODIPine Besylate 10 MG Oral Tablet; TAKE 1 TABLET DAILY;    Therapy: 06QWQ1714 to (Evaluate:07Dec2017)  Requested for: 68Aqi4185; Last   Rx:71Ztv3623 Ordered   3  Aspirin  MG Oral Tablet Delayed Release; Take 1 tablet daily; Therapy: 25WBS8546 to (Evaluate:21Jan2018)  Requested for: 98Gss9184; Last   Rx:17Dvn2697 Ordered   4  Atorvastatin Calcium 20 MG Oral Tablet; TAKE 1 TABLET DAILY FOR CHOLESTEROL; Therapy: 81Ecg3152 to (Evaluate:02Nov2017)  Requested for: 81LHO9699; Last   Rx:07Nov2016 Ordered   5  Baclofen 10 MG Oral Tablet; TAKE 0 5 TABLET DAILY AT 2PM AND MAY TAKE ADDITIONAL   0 5 TABLET DAILYAS NEEDED; Therapy: 40DVM4477 to (Evaluate:12Mar2017)  Requested for: 34Jlz3774; Last   Rx:69Izm1112 Ordered   6  Furosemide 40 MG Oral Tablet; TAKE 1 TABLET DAILY  Requested for: 94Xpx0184; Last   Rx:23Tgy2329 Ordered   7  Gabapentin 300 MG Oral Capsule; TAKE 1 CAPSULE 3 TIMES DAILY; Therapy: 26XBM5583 to (21 234 )  Requested for: 84BJS6530; Last   Rx:42Ork7974 Ordered   8  Losartan Potassium 50 MG Oral Tablet; TAKE 1 TABLET DAILY; Therapy: 07NGK3191 to (Evaluate:20Oct2018)  Requested for: 25Oct2017; Last   TY:84HRQ8918 Ordered   9  MetFORMIN HCl - 500 MG Oral Tablet; TAKE 1 TABLET twice a day with meals    Requested for: 21Jun2017; Last Rx:21Jun2017 Ordered   10  OneTouch Ultra 2 w/Device Kit; Therapy: 94HHX6996 to (Last Rx:15Mar2011)  Requested for: 10TPX2954 Ordered   11  OneTouch Ultra Blue In [employer]; test 3 times daily; Therapy: 35BDD3746 to (DUQEFQ:17SVM4047)  Requested for: 25Oct2017; Last    EU:07AKO0749 Ordered   12  OneTouch UltraSoft Lancets Miscellaneous; TEST TWICE A DAY; Therapy: 89KTV2111 to (Last AX:16GZC1181)  Requested for: 25Oct2017 Ordered   13  Pantoprazole Sodium 40 MG Oral Tablet Delayed Release; TAKE 1 TABLET TWICE DAILY    30 MINUTES BEFORE BREAKFAST AND DINNER  Requested for: 96Gpu0349; Last    Rx:10Sep2017 Ordered   14  Propranolol HCl ER 60 MG Oral Capsule Extended Release 24 Hour; take 1 capsule    daily;     Therapy: 24RXT6532 to (Evaluate:11Mar2018)  Requested for: 17QCB2002; Last    Rx:17Mar2017 Ordered  Medication List Reviewed: The medication list was reviewed and updated today  Allergies  1  No Known Drug Allergies  Denied    2  Anesthesia Extension Tubing Miscellaneous    Vitals  Signs   Recorded: 36MSS1532 01:21PM   Heart Rate: 89  Respiration: 18  Systolic: 366, LUE, Sitting  Diastolic: 79, LUE, Sitting  Weight Unobtainable: Yes    Physical Exam    Neurologic   Orientation to person, place, and time: Normal     Recent and remote memory: Demonstrates normal memory  Attention span and concentration: Normal thought process and attention span  Language: Names objects, able to repeat phrases and speaks spontaneously  Fund of knowledge: Normal vocabulary with appropriate knowledge of current events and past history  Constitutional   General Appearance: Appears appropriate for age, healthy, well developed, appropriately groomed and appropriately dressed -- pos distal pulses ciaran  Head and Face   Head and face: Atraumatic on inspection  Facial strength normal        Pulmonary   Respiratory effort: Lungs are clear bilaterally      Musculoskeletal   Gait and station: Abnormal  -- in wheelchair  Muscle strength: Abnormal     Motor Strength:  the patient is right hand dominant  -- the patient was paraplegic  Strength examination:   Biceps strength was 4+/5 on the right side-- and-- 4+/5 on the left side  Triceps strength was 4+/5 on the right side-- and-- 4+/5 on the left side  Shoulder flexion was 4/5 on the right side-- and-- 4/5 on the left side  Foot Plantar Flexion: 2/5 on the right side and 2/5 on the left side  Foot Dorsiflexion: 1/5 on the right side and 1/5 on the left side  Hip Flexion: 3/5 on the right side and 3/5 on the left side  Muscle tone: Abnormal  -- clonus B/L LE  Involuntary movements: None observed              Neurologic   Mental Status: Mood is normal  Affect is normal  Memory is intact  (Concentration) No apparent agnosia present  Thought process appears clear and appropriate  Reflexes: Abnormal  -- Brisk 4+, clonus  Coordination: Abnormal  -- slower WILLIAM on RUE  Sensation: Abnormal  -- (decreased vibration left side vs right, absent vibration at achilles B/L  decreased temperature right UE)   Judgment and insight: Normal     Cranial Nerve Exam   2nd cranial nerve: Abnormal  -- Double vision in left visual fields  3rd, 4th, and 6th cranial nerve: Abnormal  -- Left eye medially deviated, alternating esotropia  VBrennan Kathy with no deficitl  VII: Normal with no deficit  VIII: Normal with no deficit  IV: Normal with no deficit  XI: Normal with no deficit  XII: Normal with no deficit  Constitutional   General appearance: No acute distress, well appearing and well nourished  Eyes   Conjunctiva and lids: No erythema, swelling or discharge  Recent and remote memory: Intact  Mood and affect: Normal        Results/Data  Diagnostic Studies Reviewed: I personally reviewed the films/images/results in the office today  My interpretation follows  Diagnostic Review see hpi  Future Appointments    Date/Time Provider Specialty Site   01/12/2018 02:30 PM Anny Anton [facility] Neurology [facility] 5156   11/29/2017 02:00 PM IVORY Lainez   Neurosurgery [facility]   05/21/2018 01:00 PM Ravi Pires MD Urology [address] [place] [address] [place]     Signatures   Electronically signed by : IVORY Chu ; Nov 7 2017  3:01PM EST                       (Author)

## 2017-11-24 ENCOUNTER — APPOINTMENT (OUTPATIENT)
Dept: LAB | Facility: HOSPITAL | Age: 67
End: 2017-11-24
Attending: PSYCHIATRY & NEUROLOGY
Payer: COMMERCIAL

## 2017-11-24 ENCOUNTER — TRANSCRIBE ORDERS (OUTPATIENT)
Dept: ADMINISTRATIVE | Facility: HOSPITAL | Age: 67
End: 2017-11-24

## 2017-11-24 ENCOUNTER — HOSPITAL ENCOUNTER (OUTPATIENT)
Dept: MRI IMAGING | Facility: HOSPITAL | Age: 67
Discharge: HOME | End: 2017-11-24
Attending: PSYCHIATRY & NEUROLOGY
Payer: COMMERCIAL

## 2017-11-24 DIAGNOSIS — G35 MULTIPLE SCLEROSIS (HCC): ICD-10-CM

## 2017-11-24 DIAGNOSIS — G35 MULTIPLE SCLEROSIS (HCC): Primary | ICD-10-CM

## 2017-11-24 DIAGNOSIS — I72.9 RUPTURED ANEURYSM OF ARTERY (HCC): ICD-10-CM

## 2017-11-24 LAB
ALBUMIN SERPL BCP-MCNC: 3.7 G/DL (ref 3.5–5)
ALP SERPL-CCNC: 95 U/L (ref 46–116)
ALT SERPL W P-5'-P-CCNC: 27 U/L (ref 12–78)
AST SERPL W P-5'-P-CCNC: 13 U/L (ref 5–45)
BASOPHILS # BLD AUTO: 0.09 THOUSANDS/ΜL (ref 0–0.1)
BASOPHILS NFR BLD AUTO: 1 % (ref 0–1)
BILIRUB DIRECT SERPL-MCNC: 0.15 MG/DL (ref 0–0.2)
BILIRUB SERPL-MCNC: 0.49 MG/DL (ref 0.2–1)
BUN SERPL-MCNC: 13 MG/DL (ref 5–25)
CREAT SERPL-MCNC: 0.9 MG/DL (ref 0.6–1.3)
EOSINOPHIL # BLD AUTO: 0.45 THOUSAND/ΜL (ref 0–0.61)
EOSINOPHIL NFR BLD AUTO: 4 % (ref 0–6)
ERYTHROCYTE [DISTWIDTH] IN BLOOD BY AUTOMATED COUNT: 13.8 % (ref 11.6–15.1)
GFR SERPL CREATININE-BSD FRML MDRD: 87 ML/MIN/1.73SQ M
HCT VFR BLD AUTO: 45.3 % (ref 36.5–49.3)
HGB BLD-MCNC: 15.4 G/DL (ref 12–17)
LYMPHOCYTES # BLD AUTO: 3.32 THOUSANDS/ΜL (ref 0.6–4.47)
LYMPHOCYTES NFR BLD AUTO: 28 % (ref 14–44)
MCH RBC QN AUTO: 29.7 PG (ref 26.8–34.3)
MCHC RBC AUTO-ENTMCNC: 34 G/DL (ref 31.4–37.4)
MCV RBC AUTO: 88 FL (ref 82–98)
MONOCYTES # BLD AUTO: 0.77 THOUSAND/ΜL (ref 0.17–1.22)
MONOCYTES NFR BLD AUTO: 7 % (ref 4–12)
NEUTROPHILS # BLD AUTO: 7.16 THOUSANDS/ΜL (ref 1.85–7.62)
NEUTS SEG NFR BLD AUTO: 60 % (ref 43–75)
NRBC BLD AUTO-RTO: 0 /100 WBCS
PLATELET # BLD AUTO: 306 THOUSANDS/UL (ref 149–390)
PMV BLD AUTO: 9.1 FL (ref 8.9–12.7)
PROT SERPL-MCNC: 8.2 G/DL (ref 6.4–8.2)
RBC # BLD AUTO: 5.18 MILLION/UL (ref 3.88–5.62)
WBC # BLD AUTO: 11.79 THOUSAND/UL (ref 4.31–10.16)

## 2017-11-24 PROCEDURE — 70546 MR ANGIOGRAPH HEAD W/O&W/DYE: CPT

## 2017-11-24 PROCEDURE — 85025 COMPLETE CBC W/AUTO DIFF WBC: CPT

## 2017-11-24 PROCEDURE — 36415 COLL VENOUS BLD VENIPUNCTURE: CPT

## 2017-11-24 PROCEDURE — 80076 HEPATIC FUNCTION PANEL: CPT

## 2017-11-24 PROCEDURE — A9577 INJ MULTIHANCE: HCPCS | Performed by: PSYCHIATRY & NEUROLOGY

## 2017-11-24 PROCEDURE — 84520 ASSAY OF UREA NITROGEN: CPT

## 2017-11-24 PROCEDURE — 82565 ASSAY OF CREATININE: CPT

## 2017-11-24 RX ADMIN — GADOBENATE DIMEGLUMINE 18 ML: 529 INJECTION, SOLUTION INTRAVENOUS at 14:38

## 2017-11-26 ENCOUNTER — GENERIC CONVERSION - ENCOUNTER (OUTPATIENT)
Dept: OTHER | Facility: OTHER | Age: 67
End: 2017-11-26

## 2017-11-29 ENCOUNTER — ALLSCRIPTS OFFICE VISIT (OUTPATIENT)
Dept: OTHER | Facility: OTHER | Age: 67
End: 2017-11-29

## 2017-11-30 NOTE — PROGRESS NOTES
Assessment  1  Aneurysm (442 9) (I72 9)    Plan     · Follow-up visit in 1 year Evaluation and Treatment  Follow-up  Status: Hold For -Scheduling  Requested for: 58RQI3695   Ordered; For: Aneurysm; Ordered By: Danay Pierre Performed:  Due: 88MHY6141     · MRA HEAD W WO CONTRAST; Status:Need Information - Financial Authorization; Requested for:Before next appointment;    Perform:[facility]; Order Comments:baa stent coiled, with and without contrast;Ordered; Aneurysm of basilar artery; Ordered By:Viraj Mireles;    Discussion/Summary    This is a 58-year-old gentleman with a complicated medical history including spinal cord atrophy and multiple sclerosis who had a basilar apex aneurysm stent assisted coiling by Dr Ysabel Childress in March of 2016  Postoperative angiogram at 6 months revealed Everlena Anger 1 coiling of the lesion  He returns to clinic today with a follow-up MRA  There has been question of a residual 2 millimeter outpouching at the origin of the aneurysm and P1 on the left side  I find this hard to appreciate  Overall I feel that the MRA has been stable  Given that a 2 millimeter remnant would not require treatment we will continue to monitor it with noninvasive imaging  We discussed the risks of aneurysm rupture after aneurysm treatment as well as the natural history subarachnoid hemorrhage  Given his strong family history will continue to follow with noninvasive imaging  I agree with the continuation of aspirin given intracranial stent  I spent 40 minutes in the care of Mr Saige Herrera, at least 21 of which were spent in counseling  I personally reviewed his imaging  Chief Complaint  Patient presents for annual f/u r/t aneurysm coil embolization in 2015  He has new MRA with him  History of Present Illness  Mr Saige Herrera Is a 58-year-old gentleman with a long history of multiple sclerosis who is followed by Dr Darcie Ricardo  he previously had a basilar apex aneurysm stent coiled by Dr Ysabel Childress in March of 2015   He underwent a 6 month follow-up arteriogram which revealed stable Malcolm 1 coil embolization of his aneurysm and has had annual MRAs since then  His health has deteriorated over the course of his original treatment knees had worsening lower extremity weakness  An MRI of his thoracic spine has been ordered for evaluation of this for Dr Delwyn Meckel  Other than this, he has no new complaints  He takes aspirin 325  He has not smoked for 30 years  He has a significant family history of aortic and intracranial aneurysms including paternal grandfather with brain aneurysm, father with an aortic aneurysm, sister with two treated brain aneurysms, a niece with two brain aneurysms, a nephew with a repaired aortic aneurysm, and another niece who  from brain aneurysm rupture  Review of Systems   Constitutional: feeling poorly, but-- no fever,-- no recent weight gain,-- no chills-- and-- no recent weight loss--   The patient presents with complaints of feeling tired (Feels lowsy and fatigued)  Eyes: no eye pain,-- no dryness of the eyes,-- eyes not red,-- no purulent discharge from the eyes-- and-- no itching of the eyes--   The patient presents with complaints of constant episodes of bilateral eye eyesight problems, described as blurry vision (Has glaucoma )  ENT: no complaints of earache, no hearing loss, no nosebleeds, no nasal discharge, no sore throat, no hoarseness  Cardiovascular: fast heart rate, but-- the heart rate was not slow,-- no chest pain,-- no intermittent leg claudication,-- no palpitations-- and-- no extremity edema  Respiratory: shortness of breath-- and-- shortness of breath during exertion, but-- no cough,-- no orthopnea,-- no wheezing-- and-- no PND  Gastrointestinal: No complaints of abdominal pain, no constipation, no nausea or vomiting, no diarrhea or bloody stools  Genitourinary: No complaints of dysuria, no incontinence, no hesitancy, no nocturia, no genital lesion, no testicular pain  Musculoskeletal: joint stiffness-- and-- bilateral legs - has MS, but-- no arthralgias,-- no joint swelling,-- no limb pain,-- no myalgias-- and-- no limb swelling  Integumentary: itching-- and-- Allergies, but-- no rashes,-- no dry skin,-- no skin lesions-- and-- no skin wound  Neurological: dizziness,-- difficulty walking-- and-- Uses wheelchair or 4 arm crutches, but-- no tingling,-- no confusion,-- no limb weakness,-- no convulsions-- and-- no fainting--   The patient presents with complaints of headache (Eye migraines - gets them 1 - 2 x week)  Psychiatric: Is not suicidal, no sleep disturbances, no anxiety or depression, no change in personality, no emotional problems  Endocrine: No complaints of proptosis, no hot flashes, no muscle weakness, no erectile dysfunction, no deepening of the voice, no feelings of weakness  Hematologic/Lymphatic: a tendency for easy bleeding,-- a tendency for easy bruising-- and-- asa 325, but-- no swollen glands-- and-- no swollen glands in the neck  ROS reviewed  Active Problems  1  Abdomen Suprapubic Catheter   2  Acute UTI (599 0) (N39 0)   3  Aneurysm (442 9) (I72 9)   4  Atopic dermatitis (691 8) (L20 9)   5  Benign colon polyp (211 3) (K63 5)   6  Benign prostatic hypertrophy (600 00) (N40 0)   7  Bilateral shoulder pain (719 41) (M25 511,M25 512)   8  BPH with obstruction/lower urinary tract symptoms (600 01,599 69) (N40 1,N13 8)   9  Bursitis of left shoulder (726 10) (M75 52)   10  Bursitis of right shoulder (726 10) (M75 51)   11  Cellulitis of right leg without foot (682 6) (L03 115)   12  Cellulitis of right lower leg (682 6) (L03 115)   13  Cervical spinal stenosis (723 0) (M48 02)   14  Depression (311) (F32 9)   15  Diabetic neuropathy (250 60,357 2) (E11 40)   16  Dyslipidemia (272 4) (E78 5)   17  Encounter for long-term (current) use of medications (V58 69) (Z79 899)   18  Esophageal reflux (530 81) (K21 9)   19  Fatigue (780 79) (R53 83)   20   Fatty liver (571 8) (K76 0)   21  Generalized anxiety disorder (300 02) (F41 1)   22  Glaucoma (365 9) (H40 9)   23  Headache (784 0) (R51)   24  Hiatal hernia (553 3) (K44 9)   25  Hyperlipidemia (272 4) (E78 5)   26  Hypertension (401 9) (I10)   27  Influenza (487 1) (J11 1)   28  Lower extremity cellulitis (682 6) (L03 119)   29  Lower extremity weakness (729 89) (R29 898)   30  Multiple sclerosis (340) (G35)   31  Neuralgia (729 2) (M79 2)   32  Neurogenic bladder (596 54) (N31 9)   33  Obstructive sleep apnea (327 23) (G47 33)   34  Other diseases of vocal cords (478 5) (J38 3)   35  Other spondylosis with myelopathy, cervical region (721 1) (M47 12)   36  Palpitations (785 1) (R00 2)   37  Preoperative cardiovascular examination (V72 81) (Z01 810)   38  Reactive airway disease (493 90) (J45 909)   39  Shortness of breath (786 05) (R06 02)   40  Thyroid nodule (241 0) (E04 1)   41  Tinea corporis (110 5) (B35 4)   42  Type 2 diabetes mellitus (250 00) (E11 9)   43  Urinary retention (788 20) (R33 9)   44  UTI (urinary tract infection) (599 0) (N39 0)   45  Visit for pre-operative examination (V72 84) (Z01 818)   46  Vitamin D deficiency (268 9) (E55 9)    Past Medical History  1  Acute bronchitis (466 0) (J20 9)   2  History of Acute laryngitis (464 00) (J04 0)   3  History of Acute nonsuppurative otitis media, unspecified laterality (381 00) (H65 199)   4  History of Arm weakness (729 89) (R29 898)   5  History of Arthritis (V13 4)   6  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)   7  History of Bronchitis (490) (J40)   8  History of Cough (786 2) (R05)   9  History of Diabetes Mellitus (250 00)   10  History of Dysfunction of Eustachian tube, unspecified laterality (381 81) (H69 80)   11  History of Erectile dysfunction of non-organic origin (302 72) (F52 21)   12  History of acute bronchitis (V12 69) (Z87 09)   13  History of bladder infections (V13 02) (Z87 440)   14   History of constipation (V12 79) (Z87 19)   15  History of dizziness (V13 89) (Z87 898)   16  History of fatigue (V13 89) (Z87 898)   17  History of fatigue (V13 89) (Z87 898)   18  History of fatigue (V13 89) (Z87 898)   19  History of Imbalance (781 2) (R26 89)   20  History of Leg muscle spasm (728 85) (M62 838)   21  History of Memory problem (780 93) (R41 3)   22  History of Nephrolithiasis (V13 01)   23  History of No natural teeth (520 0) (K00 0)   24  History of Sinus pain (478 19) (J34 89)   25  History of Strain of thoracic region (847 1) (S29 019A)   26  History of Wears eyeglasses (V49 89) (Z97 3)    The active problems and past medical history were reviewed and updated today  Surgical History  1  History of Diagnostic Cystoscopy   2  History of Diagnostic Cystoscopy   3  History of Myringotomy - With Ventilating Tube Insertion   4  History of Transscleral Cyclophotocoagulation Noncontact YAG Laser    The surgical history was reviewed and updated today  Family History  Mother    1  Family history of Coronary Artery Disease (V17 49)   2  Family history of    3  Family history of Heart problem   4  Family history of Macular Degeneration   5  Family history of Type 2 Diabetes Mellitus  Father    6  Family history of    7  Family history of Heart problem  Sister    8  Family history of Brain aneurysm   9  Family history of Malignant Pancreatic Neoplasm  Maternal Grandmother    10  Family history of Cancer   11  Family history of   Paternal Grandmother    15  Family history of   Maternal Grandfather    15  Family history of   Paternal Grandfather    15  Family history of     The family history was reviewed and updated today         Social History     · Assistive Devices: Wheelchair   · Drinks coffee   · Education Level: Less than high school   · Former smoker (C28 33) (S80 418)   · Functioning activity level   · Lives with relatives   · Marital History - Single   · Never Drank Alcohol   · No alcohol use   · No caffeine use   · No drug use   · Not currently employed   · Single  The social history was reviewed and updated today  Current Meds   1  ALPRAZolam 0 25 MG Oral Tablet; TAKE 1 TABLET 3 TIMES DAILY FOR ANXIETY; Last Rx:2017 Ordered   2  AmLODIPine Besylate 10 MG Oral Tablet; TAKE 1 TABLET DAILY; Therapy: 34XCW7233 to (Evaluate:2017)  Requested for: 52Wgs8005; Last Rx:58Gta5196 Ordered   3  Aspirin  MG Oral Tablet Delayed Release; Take 1 tablet daily; Therapy: 00OOT4562 to (Evaluate:2018)  Requested for: 47Kil1387; Last Rx:86Lox5584 Ordered   4  Atorvastatin Calcium 20 MG Oral Tablet; TAKE 1 TABLET DAILY FOR CHOLESTEROL; Therapy: 11Ihd9091 to (Evaluate:2017)  Requested for: 85KAW0379; Last Rx:2016 Ordered   5  Baclofen 10 MG Oral Tablet; TAKE 0 5 TABLET DAILY AT 2PM AND MAY TAKE ADDITIONAL 0 5 TABLET DAILYAS NEEDED; Therapy: 34LCN7134 to (Evaluate:2017)  Requested for: 38Twu6028; Last Rx:38Zfs6691 Ordered   6  Furosemide 40 MG Oral Tablet; TAKE 1 TABLET DAILY  Requested for: 74Jie3860; Last Rx:01Yyv5113 Ordered   7  Gabapentin 300 MG Oral Capsule; TAKE 1 CAPSULE 3 TIMES DAILY; Therapy: 21VFU7749 to (77 873 135)  Requested for: 88JNN0023; Last Rx:36Erd1796 Ordered   8  Losartan Potassium 50 MG Oral Tablet; TAKE 1 TABLET DAILY; Therapy: 65YYM5818 to (Evaluate:2018)  Requested for: 2017; Last AW:09IMH0349 Ordered   9  MetFORMIN HCl - 500 MG Oral Tablet; TAKE 1 TABLET twice a day with meals  Requested for: 2017; Last Rx:2017 Ordered   10  OneTouch Ultra 2 w/Device Kit; Therapy: 59LMW5629 to (Last Rx:2011)  Requested for: 09PDN5704 Ordered   11  OneTouch Ultra Blue In [employer]; test 3 times daily; Therapy: 42RNH0070 to (FJPLCFJB:71QFT1085)  Requested for: 2017; Last  A79XCM0486 Ordered   12  OneTouch UltraSoft Lancets Miscellaneous; TEST TWICE A DAY;   Therapy: 64VVZ4177 to (Last KN:36HQP0336)  Requested for: 85Itu0159 Ordered   13  Pantoprazole Sodium 40 MG Oral Tablet Delayed Release; TAKE 1 TABLET TWICE DAILY  30 MINUTES BEFORE BREAKFAST AND DINNER  Requested for: 73Tht3624; Last  Rx:08Iqo6281 Ordered   14  Propranolol HCl ER 60 MG Oral Capsule Extended Release 24 Hour; take 1 capsule  daily; Therapy: 07TEE7913 to (Evaluate:11Mar2018)  Requested for: 54GCU3248; Last  Rx:17Mar2017 Ordered    The medication list was reviewed and updated today  Allergies  1  No Known Drug Allergies    Vitals  Vital Signs    Recorded: 84NHQ5786 02:11PM   Temperature 97 9 F, Oral   Heart Rate 90, L Brachial Artery   Respiration 16   Systolic 191, LUE, Sitting   Diastolic 70, LUE, Sitting   Height 5 ft 6 in   Weight Unobtainable Yes     Physical Exam   (He is awake alert and oriented  He is in no acute distress  He is in a wheelchair  He is able to give his own history  His pupils are equal round reactive to light  His extraocular movements are intact  His face is symmetric  His tongue is midline  He has good strength in his bilateral upper upper extremities  His bilateral hip flexion is approximately 2/5, knee extension is similar  Dorsiflexion/plantar flexion approximately 2/5  These are chronic findings)  Health Management  Type 2 diabetes mellitus   *VB - Eye Exam; every 1 year; Last 79VND5944; Next Due: 09EPT2978;  Overdue    Future Appointments    Date/Time Provider Specialty Site   01/12/2018 02:30 PM Hilda Fisher [facility] Neurology [facility] 5156   05/21/2018 01:00 PM Lopez Carranza MD Urology [address]       Signatures   Electronically signed by : IVORY Mcdaniel ; Nov 29 2017  3:03PM EST                       (Author)

## 2017-12-07 ENCOUNTER — HOSPITAL ENCOUNTER (OUTPATIENT)
Dept: MRI IMAGING | Facility: HOSPITAL | Age: 67
Discharge: HOME | End: 2017-12-07
Attending: PSYCHIATRY & NEUROLOGY
Payer: COMMERCIAL

## 2017-12-07 DIAGNOSIS — G35 MULTIPLE SCLEROSIS (HCC): ICD-10-CM

## 2017-12-07 PROCEDURE — A9585 GADOBUTROL INJECTION: HCPCS | Performed by: PSYCHIATRY & NEUROLOGY

## 2017-12-07 PROCEDURE — 72157 MRI CHEST SPINE W/O & W/DYE: CPT

## 2017-12-07 RX ADMIN — GADOBUTROL 8 ML: 604.72 INJECTION INTRAVENOUS at 14:50

## 2018-01-10 NOTE — PROGRESS NOTES
Assessment    1  Multiple sclerosis (340) (G35)   2  Neurogenic bladder (596 54) (N31 9)   3  Cervical spinal stenosis (723 0) (M48 02)   4  Neuralgia (729 2) (M79 2)    Plan   BPH with obstruction/lower urinary tract symptoms    · (1) URINE CULTURE; Status:Active; Requested QFY:[id number];    Perform:[facility]; KJW:[id number];PNVRPTC; For:BPH with obstruction/lower urinary tract symptoms; Ordered By:Tarah Parrish;  Urine : URINE CLEAN CATCH  Cervical spinal stenosis, Multiple sclerosis, Neuralgia    · Gabapentin 300 MG Oral Capsule; 1 bid, 2 hs   Rx By: Michael Wilcox; Dispense: 30 Days ; #:120 Capsule; Refill: 3; For: Cervical spinal stenosis, Multiple sclerosis, Neuralgia; THANIA = N; Verified Transmission to [id number] Last Updated By: System, AddFleet; 1/12/2016 1:23:18 PM    Follow-up visit in 6 months Evaluation and Treatment  Follow-up  Status: Hold For - Scheduling  Requested for: 12Jan2016  Ordered; For: Multiple sclerosis; Ordered By: Michael Roll  Performed:   Due: 04HIA7747   Neurogenic bladder (596 54) (N31 9)       UTI (lower urinary tract infection) (599 0) (N39 0)        Aneurysm (442 9) (I72 9)          Discussion/Summary  Discussion Summary:   Clinically stable, no new sxs, 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  At this point, will remain off IMD  therapy  pt seen by dr Kennedy Essex for basilar aneursym stenting, due for updated MRA tomorrow  pt folowed by dr Gabino Cowan for his c spine myelopathy  biggest area of involvement from his ms was thoracic cord   recent MRI thoracic unchanged, no new or enh lesions, no change in compression  seen by optho   following with PCP  c/o increased le pain HS   will increase Neurontin HS  discussed increasing Baclofen, if no change in above  pt requesting 2nd referal for bladder   Medication Side Effects Reviewed: Possible side effects of new medications were reviewed with the patient/guardian today  Patient Guardian understands agrees:  The treatment plan was reviewed with the patient/guardian  The patient/guardian understands and agrees with the treatment plan   Counseling Documentation With Imm: The patient, patient's family was counseled regarding diagnostic results, risk factor reductions, prognosis, risks and benefits of treatment options  total time of encounter was 35 minutes and >50% minutes was spent counseling  Chief Complaint  Chief Complaint Free Text Note Form: Patient present for f/u appt  States he is dizzy and has continual SOB      History of Present Illness  HPI: 75 y/o male presents with brother for neurologic follow up, last seen in Augustl 2015  PT WITH PMH OF MS DIAGNOSED MANY YRS AGO, DM AND HTN AND GLAUCOMA AS WELL  PT WITH SUPRAPUBIC CATHETER PLACEMENT BY DR Bernard Olivas  PT WAS DIAGNOSED WITH PROBABLE MS IN THE 1960S  ACTUALLY UNCLEAR DIAGNOSIS FROM TIME OF PRESENTATION TO CURRENT  PT RECALLS AT THE AGE OF 15 HAVING CHEST PAIN AND THEN NUMBNESS OF THE LEFT TOES TINGLING UP THE LEG TO MID CHEST AROUND T6 AND THEN DOWN THE RIGHT SIDE IN SIMILAR DISTRIBUTION DOWN TO THE RIGHT FOOT  PT HAD LOSS OF CONTROL OF BOWEL AND BLADDER AT THAT TIME  PT HAD NO ABILITY TO WALK  PT WAS IN HOSPITAL FOR OVER 6 MOS AND ONLY ABLE TO GET AROUND WITH USE OF CRUTCHES ALL IN 20S , 30S 40S ETC  PT NOTES STILL USES CRUTCHES AS WELL AS WHEELCHAIR TO GET AROUND    PT WAS ONLY EVER GIVEN ACTH IN THE PAST  Amrik Burrows PT NEVER ON IMD MEDS EITHER  PT WAS TOLD HE MAY HAVE HAD A CVA THAT AFFECTED HIS WALKING  PT SEEN IN [place], Labs unremarkable, NMO negative  MRI brain reveals scattered WML progressed since prior study 9 years earlier  MRI c-spine dIDs not reveal any lesions, MRI t-spine reveals cord lesion from T3-5; no comparison on file    Pt meets both MS criteria since MRI brain reveals disease progression from 2005 to present as well as lesions in brain & t-spine  Pt felt to be secondary progression; pt has had symptoms for about 49 years  Hold on any IMD Tx  Amrik Burrows pt had last updated mri c spine done on may 5 2015  relatively stable appearance of the cervical spine  mild to mod canal stenosis from c3-c7 inclusive  minor flattening of the normal signal cord at c4/5 and c3/4  varying degrees of multi level foramininal stenosis pt has been seen by dr Ava Espinoza for eval of significant c spine disease  per pt no elective srugery at this time  per pt he would only do surgery if changes on exam or imaging  Pt with longstanding dizziness; he has strong family history of brain aneurysms- his sister had 3 aneurysms, 1 niece with 3 aneurysms, 1 niece who passed away from aneurysm and 1 nephew with aneurysm; pt's father with aneurysm in his abdominal area  He had CTA, revealed 5mm basilar tip aneurysm  He was seen neuro-surg and underwent 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 in March 2015 by Dr Norris of neuro-surg  Pt with known c-spine myelopathy  Today, he continues to have ongoing discomfort  Pt biggest issue is pain kirt in the shoulders  pt notes long standing history of arthritis , likely exacerbated by using crutches for so long   pt notes continued tightness of the legs  pt notes rubber feeling to his legs  He presented today in a wheelchair  However, he does have use of bilateral Maur[place] crutches  He notes cervical pain, and pain between his shoulder blades  He notes some numbness and tingling in the bilateral feet her aspects  He was seen by pain management, and did undergo injections to the bilateral shoulder region which was ineffective  It was suggested that he seek orthopedic evaluation, who he has not seen as of yet  Unfortunately in the interim, he slipped on a wet spot and hit his head on the wall  There was no loss of consciousness  He did have dizziness  He was seen in the emergency room  CAT scan and cervical spine were unremarkable  he noted having a mild headache for a few days   He has a suprapubic in place and was treated in the interim for a UTI with Dr Dunia Locke  He was also seen by his primary for cellulitis of his right lower extremity  He did undergo updated MRI of the thoracic spine Mildly diminished spinal cord volume loss T3-T5 likely related to chronic MS  No intrinsic spinal cord lesions identified  No pathologic enhancement  Mild multilevel degenerative spondylosis No evidence of disc herniation, central canal or foraminal stenosis  Probable persistent nodular thyromegaly, Similar to previous MRI study  He was seen by ophthalmology in November, he was noted to have decrease bilateral peripheral vision, there was no evidence of retinopathy, his ocular pressures were mildly elevated  He is being followed closely at this point  He is also due to undergo updated MRA tomorrow for Dr Cale Yang  ROS reviewed with pt;, denies any problems with speech or swallowing         Review of Systems  Neurological ROS:   Constitutional: recent weight gain, fatigue and appetite changes  HEENT: eye pain, hearing loss and sore throat  Cardiovascular:  no chest pain or pressure, no palpitations present, the heart rate was not rapid or irregular, no swelling in the arms or legs, no poor circulation  Respiratory: unusual or persistant cough and shortness of breath with or without exertion  Gastrointestinal: nausea and loss of bowel control  Genitourinary:  no incontinence, no feelings of urinary urgency, no increase in frequency, no urinary hesitancy, no dysuria, no hematuria  Musculoskeletal: arthralgias, myalgias, head/neck/back pain and pain while walking  Integumentary rash: Perkins Kris Psychiatric: anxiety, mood swings and sleep problems  Endocrine erection difficulty  Hematologic/Lymphatic:  no unusual bleeding, no tendency for easy bruising, no clotting skin or lumps  Neurological General: headache, lightheadedness, trouble falling asleep and waking up at night  Neurological Mental Status: memory problems     Neurological Cranial Nerves: facial numbness or weakness, taste or smell loss/changes and vertigo or dizziness  Neurological Motor findings include: tremor and cramping(pre/post exercise)  Neurological Coordination: unsteadiness, balance difficulties and clumsiness  Neurological Sensory: numbness and tingling  Neurological Gait: difficulty walking and has had falls  Active Problems     1  Benign colon polyp (211 3) (K63 5)   2  Benign prostatic hypertrophy (600 00) (N40 0)   3  Bilateral shoulder pain (719 41) (M25 511,M25 512)   4  BPH with obstruction/lower urinary tract symptoms (600 01) (N40 1)   5  Bursitis of left shoulder (726 10) (M75 52)   6  Bursitis of right shoulder (726 10) (M75 51)   7  Cellulitis of right leg without foot (682 6) (L03 115)   8  Cervical spinal stenosis (723 0) (M48 02)   9  Depression (311) (F32 9)   10  Dyslipidemia (272 4) (E78 5)   11  Encounter for long-term (current) use of medications (V58 69) (Z79 899)   12  Esophageal reflux (530 81) (K21 9)   13  Fatty liver (571 8) (K76 0)   14  Generalized anxiety disorder (300 02) (F41 1)   15  Glaucoma (365 9) (H40 9)   16  Headache (784 0) (R51)   17  Hiatal hernia (553 3) (K44 9)   18  Hyperlipidemia (272 4) (E78 5)   19  Hypertension (401 9) (I10)   20  Influenza (487 1) (J11 1)   21  Multiple sclerosis (340) (G35)   22  Neuralgia (729 2) (M79 2)   23  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)   24  Obstructive sleep apnea (327 23) (G47 33)   25  Other spondylosis with myelopathy, cervical region (721 1) (M47 12)   26  Preoperative cardiovascular examination (V72 81) (Z01 810)   27  Reactive airway disease (493 90) (J45 909)   28  Shortness of breath (786 05) (R06 02)   29  Thyroid nodule (241 0) (E04 1)   30  Urinary retention (788 20) (R33 9)   31  Visit for pre-operative examination (V72 84) (Z01 818)   32   Vitamin D deficiency (268 9) (E55 9)    Type 2 diabetes mellitus (250 00) (E11 9)       Neurogenic bladder (596 54) (N31 9)       UTI (lower urinary tract infection) (599 0) (N39 0)       Fatigue (780 79) (R53 83)        Aneurysm (442 9) (I72 9)          Past Medical History    1  History of Acute laryngitis (464 00) (J04 0)   2  History of Acute nonsuppurative otitis media, unspecified laterality (381 00) (H65 199)   3  History of Arm weakness (729 89) (M62 81)   4  History of Arthritis (V13 4)   5  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)   6  History of Bronchitis (490) (J40)   7  History of Cough (786 2) (R05)   8  History of Diabetes Mellitus (250 00)   9  History of Dysfunction of eustachian tube, unspecified laterality (381 81) (H69 80)   10  History of Erectile dysfunction of non-organic origin (302 72) (F52 21)   11  History of acute bronchitis (V12 69) (Z87 09)   12  History of bladder infections (V13 02) (Z87 440)   13  History of constipation (V12 79) (Z87 19)   14  History of dizziness (V13 89) (Z87 898)   15  History of fatigue (V13 89) (Z87 898)   16  History of fatigue (V13 89) (Z87 898)   17  History of Imbalance (781 2) (R26 89)   18  History of Leg muscle spasm (728 85) (M62 838)   19  History of Memory problem (780 93) (R41 3)   20  History of Nephrolithiasis (V13 01)   21  History of No natural teeth (520 0) (K00 0)   22  History of Sinus pain (478 19) (J34 89)   23  History of Strain of thoracic region (847 1) (S29 012A)   24  History of Wears eyeglasses (V49 89) (Z97 3)    Surgical History    1  History of Diagnostic Cystoscopy   2  History of Myringotomy - With Ventilating Tube Insertion   3  History of Transscleral Cyclophotocoagulation Noncontact YAG Laser    Family History    1  Family history of Coronary Artery Disease (V17 49)   2  Family history of    3  Family history of Heart problem   4  Family history of Macular Degeneration   5  Family history of Type 2 Diabetes Mellitus    6  Family history of    7  Family history of Heart problem    8  Family history of Brain aneurysm   9   Family history of Malignant Pancreatic Neoplasm    10  Family history of Cancer   11  Family history of     15  Family history of     15  Family history of     15  Family history of     Social History    · Assistive Devices: Wheelchair   · Drinks coffee   · Education Level: Less than high school   · Former smoker (A64 42) (Y54 987)   · Functioning activity level   · Lives with relatives   · Marital History - Single   · Never Drank Alcohol   · No alcohol use   · No caffeine use   · No drug use   · Not currently employed   · Single    Current Meds   1  ALPRAZolam 0 25 MG Oral Tablet; TAKE 1 TABLET 3 times daily; Last Rx:37Abp9480   Ordered   2  AmLODIPine Besylate 10 MG Oral Tablet; TAKE 1 TABLET DAILY; Therapy: 92FXW2705 to (Evaluate:19Xun0906)  Requested for: 53Ntz6341; Last   Rx:91Anx3708 Ordered   3  Aspirin  MG Oral Tablet Delayed Release; TAKE 1 TABLET BY MOUTH EACH DAY; Therapy: (Recorded:21Sqy9024) to Recorded   4  Aspirin  MG Oral Tablet Delayed Release; Take 1 tablet daily; Therapy: 12XRC5416 to (Domitila Mcdermott)  Requested for: 2015; Last   Rx:95Lua6212; Status: ACTIVE - Retrospective By Protocol Authorization Ordered   5  Atorvastatin Calcium 20 MG Oral Tablet; TAKE 1 TABLET DAILY FOR CHOLESTEROL; Therapy: 91Qvi2379 to (Evaluate:2016)  Requested for: 2015; Last   Rx:2015 Ordered   6  Baclofen 10 MG Oral Tablet; TAKE 0 5 TABLET Daily at 2pm and may take additional 0 5   tablet daily PRN  Requested for: 56AVG3259; Last Rx:2015 Ordered   7  Betamethasone Dipropionate Aug 0 05 % External Cream; APPLY AND GENTLY   MASSAGE INTO AFFECTED AREA(S) TWICE DAILY; Therapy: 01EKM7389 to (Last Rx:2015)  Requested for: 20MTN0278 Ordered   8  Gabapentin 300 MG Oral Capsule; 1 TAB QHS X 3D, THEN 1 TAB BID X 3D, THEN 1 TAB   TID; Therapy: 40HPB5499 to (Evaluate:97Our4819)  Requested for: 81Xtr7127; Last   Rx:41Fky5327 Ordered   9   Levofloxacin 500 MG Oral Tablet; TAKE 1 TABLET DAILY AS DIRECTED; Therapy: 08Osm0713 to (Evaluate:94Cij4029)  Requested for: 28Bos8801; Last   Rx:72Ijw2351 Ordered   10  Losartan Potassium 50 MG Oral Tablet; TAKE 1 TABLET DAILY; Therapy: 38PXP0880 to (Evaluate:77Cgs6151)  Requested for: 92Hnz1108; Last    Rx:70Gpn8135 Ordered   11  MetFORMIN HCl - 500 MG Oral Tablet; 1 tablet daily; Therapy: 83Mlx7654 to (Evaluate:59Snc4124)  Requested for: 33Bpo9488; Last    Rx:47Fdj8937 Ordered   12  OneTouch Ultra 2 w/Device Kit; Therapy: 66XFB7637 to (Last Rx:15Mar2011)  Requested for: 55SDT2984 Ordered   13  OneTouch Ultra Blue In [employer]; test 3 times daily; Therapy: 17RVN7428 to (Evaluate:18Nov2015)  Requested for: 34Dpe1480; Last    Rx:81Jbe6314 Ordered   14  OneTouch UltraSoft Lancets Miscellaneous; TEST TWICE A DAY; Therapy: 53TAG2854 to ( Laura)  Requested for: 57Mwz8767 Ordered   15  Pantoprazole Sodium 40 MG Oral Tablet Delayed Release; TAKE 1 TABLET TWICE DAILY    30 MINUTES BEFORE BREAKFAST AND DINNER  Requested for: 69YAW9504; Last    Rx:58Tya5509 Ordered   16  Propranolol HCl ER 60 MG Oral Capsule Extended Release 24 Hour; take 1 capsule    daily; Therapy: 98PZS3482 to (Preeti Clark)  Requested for: 07WVD7712; Last    Rx:14Oct2015 Ordered   17  Sucralfate 1 GM Oral Tablet; Therapy: (Recorded:30Mar2015) to Recorded    Allergies    1  No Known Drug Allergies  Denied    2  Anesthesia Extension Tubing Miscellaneous    Vitals  Signs [Data Includes: Current Encounter]   Recorded: 12Jan2016 12:30PM   Temperature: 97 1 F  Heart Rate: 94  Respiration: 16  Systolic: 081, LUE, Sitting  Diastolic: 68, LUE, Sitting  Patient Refused Height: Yes  Patient Refused Weight: Yes  O2 Saturation: 95  Patient Refused Height: Yes  Patient Refused Weight: Yes    Physical Exam    Constitutional   General appearance: Abnormal   edema LE  Head and Face   Head and face: Atraumatic on inspection   Facial strength normal    Eyes Ophthalmoscopic examination: Vision is grossly normal  Gross visual field testing by confrontation shows no abnormalities  EOMI in both eyes  Conjunctivae clear  Eyelids normal palpebral fissures equal  Orbits exhibit normal position  No discharge from the eyes  PERRL  Pulmonary   Respiratory effort: Lungs are clear bilaterally    Cardiovascular   Auscultation of heart: Rate is regular  Rhythm is regular     Musculoskeletal   Gait and station: Abnormal   in wheelchair  Muscle strength: Abnormal     Motor Strength:  the patient is right hand dominant  the patient was paraplegic  strength was normal in both upper extremities  there was weakness in both lower extremities  Strength examination:   Biceps strength was 4+/5 on the right side and 4+/5 on the left side  Triceps strength was 4+/5 on the right side and 4+/5 on the left side  Shoulder flexion was 4/5 on the right side and 4/5 on the left side  Foot Plantar Flexion: 2/5 on the right side and 2/5 on the left side  Foot Dorsiflexion: 1/5 on the right side and 1/5 on the left side  Hip Flexion: 3/5 on the right side and 3/5 on the left side  Muscle tone: Abnormal   clonus B/L LE  Involuntary movements: None observed  Neurologic   Mental Status: Mood is normal  Affect is normal  Memory is intact  (Concentration) No apparent agnosia present  Thought process appears clear and appropriate  Upper Extremities:Normal to inspection  No tenderness over the upper extremities bilaterally  No instability bilaterally  Strength: Motor strength is 5/5 bilaterally  Normal muscle tone bilaterally  Muscle bulk: Muscle bulk is normal bilaterally  Full ROM bilaterally  12th cranial nerve: Abnormal     Reflexes: Abnormal   Brisk 4+, clonus  Coordination: Abnormal   slower WILLIAM on RUE     Sensation: Abnormal   (decreased vibration left side vs right, absent vibration at achilles B/L  decreased temperature right UE)   Judgment and insight: Normal  Cranial Nerve Exam   2nd cranial nerve: Abnormal   Double vision in left visual fields  3rd, 4th, and 6th cranial nerve: Abnormal   Left eye medially deviated  VRemo Sham with no deficitl  VII: Normal with no deficit  VIII: Normal with no deficit  XI: Normal with no deficit  Constitutional   General appearance: No acute distress, well appearing and well nourished  Eyes   Conjunctiva and lids: No erythema, swelling or discharge  Recent and remote memory: Intact  Mood and affect: Normal        Results/Data  Diagnostic Studies Reviewed:   Diagnostic Review CT Cervical spine 8/28/15: No cervical spine fracture or traumatic malalignment  Ct Head W/O 8/28/15: NO acute intracranial abnormality  Status post aneurysm embolization  Results   * MRI Spine Thoracic With and Without Contrast 05Trk7043 12:10PM Allean Presume     Test Name Result Flag Reference   MRI TSpine W/ & W/O (Report)     No Address;   08/26/2015 1215   08/26/2015 1320   NONE     MRI THORACIC SPINE WITH AND WITHOUT CONTRAST     INDICATION- MS, follow-up   COMPARISON- 12/15/2014 MRI     TECHNIQUE- Sagittal T1, sagittal T2, sagittal inversion recovery,   axial T2 axial 2D merge  Sagittal and axial T1 postcontrast    8 mL of Gadavist was injected intravenously without immediate   consequence  IMAGE QUALITY- Diagnostic  FINDINGS-     ALIGNMENT- Normal alignment of the thoracic spine  No compression   fracture  No subluxation  No evidence of scoliosis  MARROW SIGNAL- Normal marrow signal is identified within the   visualized bony structures  No discrete marrow lesion  THORACIC CORD- Normal signal within the thoracic cord  Again evident is mildly diminished spinal cord volume T3-T5 likely   related to chronic MS, although no intrinsic spinal cord lesions are   confirmed on today's exam      PREVERTEBRAL AND PARASPINAL SOFT TISSUES- Prevertebral and paraspinal   soft tissues are unremarkable     Probable persistent nodular thyromegaly, incompletely characterized     THORACIC DEGENERATIVE CHANGE-    Mild degenerative spondylosis involves mid and lower thoracic segments   No evidence of disc herniation, central canal or foraminal stenosis   POSTCONTRAST- No abnormal enhancement  IMPRESSION-   Mildly diminished spinal cord volume loss T3-T5 likely related to   chronic MS     No intrinsic spinal cord lesions identified     No pathologic enhancement     Mild multilevel degenerative spondylosis     No evidence of disc herniation, central canal or foraminal stenosis     Probable persistent nodular thyromegaly     Similar to previous MRI study       Transcribed on- [id number]     - ANNIA Liao MD   Reading Radiologist- ANNIA Liao MD   Electronically Signed- ANNIA Liao MD   Released Date Time- 08/27/15 1010   ------------------------------------------------------------------------------   [id number]^IRAJ LAKHANI   [id number]^IRAJ LAKHANI     (1) NMO IgG AUTOANTIBODIES 98Zrm1181 12:00PM Lorri Mehta Order Number: [id number] AT: [place], [place] [id number] DIRECTOR: Mirna Alvarez MD   PHONE: [phone]     Test Name Result Flag Reference   NMO IgG AUTOANTIBODIES <1 5 U/mL  0 0-3 0   Negative       0 0 - 3 0  Indeterminate  3 1 - 5 0  Positive            >5 1  Results for this test are for research purposes only  by the assay's   The performance  characteristics of this product have not been  established  Results should not be used as a  diagnostic procedure without confirmation of the  diagnosis by another medically established diagnostic  product or procedure  Attending Note  Collaborating Physician Note: Collaborating Note: I agree with the Advanced Practitioner note  Future Appointments    Date/Time Provider Specialty Site   09/06/2016 01:00 PM IVORY Carrero   [facility]urology [facility]   05/16/2016 02:00 PM Holly Bravo MD Urology [address]   Electronically signed by : Jeanne Bowen; Jan 24 2016  8:40PM EST                       (Author)    Electronically signed by : IVORY Rashid ; Jan 25 2016  6:08AM EST                       (Co-author)

## 2018-01-11 NOTE — RESULT NOTES
Message   Blood test are showing that diabetes is under worse control  This may be making him feel more tired and fatigued  By our medication list it looks like he is only taking metformin once daily  Recommend increasing it to twice daily  New prescription sent  Verified Results  (Q) CBC (H/H, RBC, INDICES, WBC, PLT) [id number] 12:00AM Glenny Rice     Test Name Result Flag Reference   WHITE BLOOD CELL COUNT 10 9 Thousand/uL H 3 8-10 8   RED BLOOD CELL COUNT 5 23 Million/uL  4 20-5 80   HEMOGLOBIN 15 2 g/dL  13 2-17 1   HEMATOCRIT 47 1 %  38 5-50 0   MCV 89 9 fL  80 0-100 0   MCH 29 0 pg  27 0-33 0   MCHC 32 3 g/dL  32 0-36 0   RDW 13 6 %  11 0-15 0   PLATELET COUNT 599 Thousand/uL  140-400     (Q) COMPREHENSIVE METABOLIC PNL W/ADJUSTED CALCIUM [id number] 12:00AM Zwittle     Test Name Result Flag Reference   GLUCOSE 118 mg/dL H 65-99   Fasting reference interval   UREA NITROGEN (BUN) 11 mg/dL  7-25   CREATININE 0 82 mg/dL  0 70-1 25   For patients >52years of age, the reference limit  for Creatinine is approximately 13% higher for people  identified as -American  eGFR NON-AFR   AMERICAN 92 mL/min/1 73m2  > OR = 60   eGFR AFRICAN AMERICAN 107 mL/min/1 73m2  > OR = 60   BUN/CREATININE RATIO   6-16   NOT APPLICABLE (calc)   SODIUM 136 mmol/L  135-146   POTASSIUM 4 8 mmol/L  3 5-5 3   CHLORIDE 98 mmol/L     CARBON DIOXIDE 26 mmol/L  19-30   CALCIUM 9 8 mg/dL  8 6-10 3   CALCIUM (ADJUSTED FOR$ALBUMIN) 9 9 mg/dL (calc)  8 6-10 2   PROTEIN, TOTAL 7 5 g/dL  6 1-8 1   ALBUMIN 4 2 g/dL  3 6-5 1   GLOBULIN 3 3 g/dL (calc)  1 9-3 7   ALBUMIN/GLOBULIN RATIO 1 3 (calc)  1 0-2 5   BILIRUBIN, TOTAL 0 3 mg/dL  0 2-1 2   ALKALINE PHOSPHATASE 91 U/L     AST 13 U/L  10-35   ALT 18 U/L  9-46     (Q) HEMOGLOBIN A1c WITH eAG [id number] 12:00AM Zwittle     Test Name Result Flag Reference   HEMOGLOBIN A1c 7 6 % of total Hgb H <5 7   According to ADA guidelines, hemoglobin A1c <7 0%  represents optimal control in non-pregnant diabetic  patients  Different metrics may apply to specific  patient populations  Standards of Medical Care in    Diabetes Care  2013;36:s11-s66     For the purpose of screening for the presence of  diabetes  <5 7%       Consistent with the absence of diabetes  5 7-6 4%    Consistent with increased risk for diabetes              (prediabetes)  >or=6 5%    Consistent with diabetes     This assay result is consistent with diabetes  mellitus  Currently, no consensus exists for use of hemoglobin  A1c for diagnosis of diabetes for children  eAG (mg/dL) 171 (calc)     eAG (mmol/L) 9 5 (calc)       (Q) LIPID PANEL WITH DIRECT LDL [id number] 12:00AM ShivaniGalmarc Appl     Test Name Result Flag Reference   CHOLESTEROL, TOTAL 169 mg/dL  125-200   HDL CHOLESTEROL 46 mg/dL  > OR = 40   TRIGLICERIDES 935 mg/dL H <150   DIRECT  mg/dL  <130   Desirable range <100 mg/dL for patients with CHD or  diabetes and <70 mg/dL for diabetic patients with  known heart disease  CHOL/HDLC RATIO 3 7 (calc)  < OR = 5 0   NON HDL CHOLESTEROL 123 mg/dL (calc)     Target for non-HDL cholesterol is 30 mg/dL higher than   LDL cholesterol target  (Q) MICROALBUMIN, RANDOM URINE (W/CREATININE) [id number] 12:00AM Code Rebel     Test Name Result Flag Reference   CREATININE, RANDOM URINE 22 mg/dL     MICROALBUMIN 3 5 mg/dL     Reference Range  Not established   MICROALBUMIN/CREATININE$RATIO, RANDOM URINE 159 mcg/mg creat H <30   The ADA defines abnormalities in albumin  excretion as follows:     Category         Result (mcg/mg creatinine)     Normal                    <30  Microalbuminuria            Clinical albuminuria   > OR = 300     The ADA recommends that at least two of three  specimens collected within a 3-6 month period be  abnormal before considering a patient to be  within a diagnostic category       (Q) CULTURE, URINE, ROUTINE [id number] 12:00AM [facility]     Test Name Result Flag Reference   CULTURE, URINE, ROUTINE      CULTURE, URINE, ROUTINE         MICRO NUMBER:      [id number]    TEST STATUS:       FINAL    SPECIMEN SOURCE:   URINE    SPECIMEN QUALITY:  ADEQUATE    RESULT:            Three or more organisms present, each greater                       than 10,000 cu/mL  May represent normal keanu                       contamination from external genitalia  No further                       testing is required       (1) URINE CULTURE [id number] 02:19PM Corey Mercedes     Test Name Result Flag Reference   CLINICAL REPORT (Report)     Test:        Urine culture  Specimen Type:   Urine  Specimen Date:   1/13/2016 11:50 AM  Result Date:    1/14/2016 2:19 PM  Result Status:   Final result  Resulting Lab:   [facility] [address] [place] [address]            Tel: [phone]                 CULTURE                                       ------------------                                   >100,000 cfu/ml Mixed Contaminants X3       Plan  Type 2 diabetes mellitus    · From  MetFORMIN HCl - 500 MG Oral Tablet 1 tablet daily To MetFORMIN HCl  - 500 MG Oral Tablet Take 1 tablet twice daily with meals

## 2018-01-12 ENCOUNTER — ALLSCRIPTS OFFICE VISIT (OUTPATIENT)
Dept: OTHER | Facility: OTHER | Age: 68
End: 2018-01-12

## 2018-01-12 NOTE — PROGRESS NOTES
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 in the setting of significant family history of intracranial aneurysms s/p stent-assisted coil embolization on 9/9/15  His recent 1 year follow-up MRA on 1/13/16 demonstrated stable occlusion of the basilar apex aneurysm, likely stent-related artifact in the basilar and left PCA, and no de tiffanie aneurysms  We will obtain a follow-up MRA in 2 years  I counseled that the patient should he experience any TIA or stroke symptoms that he should report to the nearest emergency department and have our office contacted  Chief Complaint  5mm basilar apex aneurysm coiling 1 year follow-up      History of Present Illness  64M with a history of DM, HTN, and spinal cord atrophy with lower extremity plegia resulting in him being wheelchair bound and DM who was incidentally 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 in the setting of significant family history of intracranial aneurysms s/p stent-assisted coil embolization on 9/9/15  His recent 1 year follow-up MRA on 1/13/16 demonstrated stable occlusion of the basilar apex aneurysm, likely stent-related artifact in the basilar and left PCA, and no de tiffanie aneurysms  This case was reviewed at Neurovascular conference  Other than his longstanding lower extremity weakness, he has no other neurologic complaints He does describe occasional occipital headache and dizziness that is longstanding         The patient is being seen for an initial evaluation of a cerebral aneurysm  Aneurysm location: the basilar artery tip  Symptoms:  visual disturbance and glacoma/from ms blurred vision left eye /, but no facial weakness and no extremity weakness    The patient presents with complaints of sudden onset of mild entire head headache, described as dull  The patient presents with complaints of occasional episodes of severe neck stiffness  Review of Systems    Constitutional: feeling tired, but as noted in HPI, no fever, not feeling poorly, no recent weight gain, no chills and no recent weight loss  Eyes: eyesight problems, dryness of the eyes, itching of the eyes and glacoma worsening, but as noted in HPI, no eye pain, eyes not red and no purulent discharge from the eyes  ENT: earache, hearing loss, hoarseness and no hearing aids/, but as noted in HPI, no nosebleeds and no nasal discharge    The patient presents with complaints of sore throat (nodule removed from throat about a year ago at [facility])  Cardiovascular: No complaints of slow heart rate, no fast heart rate, no chest pain, no palpitations, no leg claudication, no lower extremity  Respiratory: shortness of breath, cough and shortness of breath during exertion, but no orthopnea, no wheezing and no PND  Gastrointestinal: constipation, but no abdominal pain, no vomiting, no diarrhea and no blood in stools    The patient presents with complaints of rare episodes of nausea (when nervous)  Genitourinary: super cupid catheter/ 6 months ago, but as noted in HPI  Integumentary: as noted in HPI  Neurological: headache, but as noted in HPI, no convulsions and no fainting    The patient presents with complaints of sudden onset of rare episodes of mild confusion  The patient presents with complaints of constant episodes of dizziness, described as faintness, vertigo, sensation of movement and loss of balance     Psychiatric: anxiety and sleep disturbances, but not suicidal, no personality change, no depression and no emotional problems  Endocrine: as noted in HPI  Hematologic/Lymphatic: on blood thinners  Active Problems     1  Aneurysm (442 9) (I72 9)   2  Benign colon polyp (211 3) (K63 5)   3  Benign prostatic hypertrophy (600 00) (N40 0)   4  Bilateral shoulder pain (719 41) (M25 511,M25 512)   5  BPH with obstruction/lower urinary tract symptoms (600 01) (N40 1)   6  Bursitis of left shoulder (726 10) (M75 52)   7  Bursitis of right shoulder (726 10) (M75 51)   8  Cellulitis of right leg without foot (682 6) (L03 115)   9  Cervical spinal stenosis (723 0) (M48 02)   10  Depression (311) (F32 9)   11  Dyslipidemia (272 4) (E78 5)   12  Encounter for long-term (current) use of medications (V58 69) (Z79 899)   13  Esophageal reflux (530 81) (K21 9)   14  Fatigue (780 79) (R53 83)   15  Fatty liver (571 8) (K76 0)   16  Generalized anxiety disorder (300 02) (F41 1)   17  Glaucoma (365 9) (H40 9)   18  Headache (784 0) (R51)   19  Hiatal hernia (553 3) (K44 9)   20  Hyperlipidemia (272 4) (E78 5)   21  Hypertension (401 9) (I10)   22  Influenza (487 1) (J11 1)   23  Multiple sclerosis (340) (G35)   24  Neurogenic bladder (596 54) (N31 9)   25  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)   26  Obstructive sleep apnea (327 23) (G47 33)   27  Other spondylosis with myelopathy, cervical region (721 1) (M47 12)   28  Preoperative cardiovascular examination (V72 81) (Z01 810)   29  Reactive airway disease (493 90) (J45 909)   30  Shortness of breath (786 05) (R06 02)   31  Thyroid nodule (241 0) (E04 1)   32  Type 2 diabetes mellitus (250 00) (E11 9)   33  Urinary retention (788 20) (R33 9)   34  UTI (urinary tract infection) (599 0) (N39 0)   35  Visit for pre-operative examination (V72 84) (Z01 818)   36  Vitamin D deficiency (268 9) (E55 9)    Neuralgia (729 2) (M79 2)          Past Medical History    1  History of Acute laryngitis (464 00) (J04 0)   2   History of Acute nonsuppurative otitis media, unspecified laterality (381 00) (H65 199)   3  History of Arm weakness (729 89) (M62 81)   4  History of Arthritis (V13 4)   5  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)   6  History of Bronchitis (490) (J40)   7  History of Cough (786 2) (R05)   8  History of Diabetes Mellitus (250 00)   9  History of Dysfunction of eustachian tube, unspecified laterality (381 81) (H69 80)   10  History of Erectile dysfunction of non-organic origin (302 72) (F52 21)   11  History of acute bronchitis (V12 69) (Z87 09)   12  History of bladder infections (V13 02) (Z87 440)   13  History of constipation (V12 79) (Z87 19)   14  History of dizziness (V13 89) (Z87 898)   15  History of fatigue (V13 89) (Z87 898)   16  History of fatigue (V13 89) (Z87 898)   17  History of fatigue (V13 89) (Z87 898)   18  History of Imbalance (781 2) (R26 89)   19  History of Leg muscle spasm (728 85) (M62 838)   20  History of Memory problem (780 93) (R41 3)   21  History of Nephrolithiasis (V13 01)   22  History of No natural teeth (520 0) (K00 0)   23  History of Sinus pain (478 19) (J34 89)   24  History of Strain of thoracic region (847 1) (S29 012A)   25  History of Wears eyeglasses (V49 89) (Z97 3)    The active problems and past medical history were reviewed and updated today  Surgical History    1  History of Diagnostic Cystoscopy   2  History of Myringotomy - With Ventilating Tube Insertion   3  History of Transscleral Cyclophotocoagulation Noncontact YAG Laser    The surgical history was reviewed and updated today  Family History    1  Family history of Coronary Artery Disease (V17 49)   2  Family history of    3  Family history of Heart problem   4  Family history of Macular Degeneration   5  Family history of Type 2 Diabetes Mellitus    6  Family history of    7  Family history of Heart problem    8  Family history of Brain aneurysm   9  Family history of Malignant Pancreatic Neoplasm    10  Family history of Cancer   11   Family history of     15  Family history of     15  Family history of     15  Family history of     The family history was reviewed and updated today  Social History    · Assistive Devices: Wheelchair   · Drinks coffee   · Education Level: Less than high school   · Former smoker (V72 76) (B12 681)   · Functioning activity level   · Lives with relatives   · Marital History - Single   · Never Drank Alcohol   · No alcohol use   · No caffeine use   · No drug use   · Not currently employed   · Single  The social history was reviewed and updated today  Current Meds   1  ALPRAZolam 0 25 MG Oral Tablet; TAKE 1 TABLET 3 times daily; Last Rx:[id number]   Ordered   2  AmLODIPine Besylate 10 MG Oral Tablet; TAKE 1 TABLET DAILY; Therapy: 01OKM9334 to (Evaluate:00Aay5676)  Requested for: 99Pdn2738; Last   Rx:39Iek1683 Ordered   3  Aspirin  MG Oral Tablet Delayed Release; TAKE 1 TABLET BY MOUTH EACH DAY; Therapy: (Recorded:84Fan6135) to Recorded   4  Aspirin  MG Oral Tablet Delayed Release; Take 1 tablet daily; Therapy: 74NXT3106 to (Chris Martinez)  Requested for: 2015; Last   Rx:2015; Status: ACTIVE - Retrospective By Protocol Authorization Ordered   5  Atorvastatin Calcium 20 MG Oral Tablet; TAKE 1 TABLET DAILY FOR CHOLESTEROL; Therapy: 04Jnh2116 to (Evaluate:2016)  Requested for: 2015; Last   Rx:2015 Ordered   6  Baclofen 10 MG Oral Tablet; TAKE 0 5 TABLET Daily at 2pm and may take additional 0 5   tablet daily PRN  Requested for: 45XZG4427; Last Rx:2015 Ordered   7  Gabapentin 300 MG Oral Capsule; 1 bid, 2 hs; Therapy: 73CTU0599 to (Evaluate:96Mfo0639)  Requested for: 2016; Last   Rx:2016 Ordered   8  Levofloxacin 500 MG Oral Tablet; TAKE 1 TABLET DAILY AS DIRECTED; Therapy: 04ZUF9441 to (Evaluate:2016)  Requested for: 39FFF3284; Last   Rx:2016 Ordered   9   Losartan Potassium 50 MG Oral Tablet; TAKE 1 TABLET DAILY; Therapy: 26KSJ4444 to (Evaluate:13Fnx1529)  Requested for: 34Qgj1540; Last   Rx:04Sep2015 Ordered   10  MetFORMIN HCl - 500 MG Oral Tablet; Take 1 tablet twice daily with meals; Therapy: 19XYN5805 to (Evaluate:15Jan2017)  Requested for: 21Jan2016; Last    Rx:21Jan2016 Ordered   11  OneTouch Ultra 2 w/Device Kit; Therapy: 32WRH5990 to (Last Rx:15Mar2011)  Requested for: 25RCL1677 Ordered   12  OneTouch Ultra Blue In [employer]; test 3 times daily; Therapy: 66NJN8332 to (Evaluate:18Nov2015)  Requested for: 29Jer1534; Last    Rx:21Aug2015 Ordered   13  OneTouch UltraSoft Lancets Miscellaneous; TEST TWICE A DAY; Therapy: 99UFS8538 to (Last Rx:17Jan2016)  Requested for: 47FGR1307 Ordered   14  Pantoprazole Sodium 40 MG Oral Tablet Delayed Release; TAKE 1 TABLET TWICE DAILY    30 MINUTES BEFORE BREAKFAST AND DINNER  Requested for: 81XQG2073; Last    Rx:14Oct2015 Ordered   15  Propranolol HCl ER 60 MG Oral Capsule Extended Release 24 Hour; take 1 capsule    daily; Therapy: 03ACC8002 to (162 218 173)  Requested for: 58IRY5972; Last    Rx:14Oct2015 Ordered    Allergies    1  No Known Drug Allergies  Denied    2  Anesthesia Extension Tubing Miscellaneous    Vitals  Vital Signs [Data Includes: Current Encounter]    Recorded: 21Jan2016 08:48AM   Temperature 98 4 F, Tympanic   Heart Rate 107, L Radial   Pulse Quality Normal, L Radial   Respiration 18   Respiration Quality Normal   Systolic 529, LUE, Sitting   Diastolic 70, LUE, Sitting   Height 5 ft 6 in   Weight 175 lb    BMI Calculated 28 25   BSA Calculated 1 89     Physical Exam  Neurological: cranial nerves 2-12 were intact, deep tendon reflexes were 2+ and symmetric and the sensory exam was normal to light touch and pinprick  Bilateral lower extremity plegia 1/5  Results/Data  Diagnostic Studies Reviewed [facility]:   I personally reviewed the in detail with the patient  MRI Review Head MRA dated 1/13/16        Health Management  Type 2 diabetes mellitus   *VB - Eye Exam; every 2 years; Last 77GCD9615; Next Due: 47OOI4154; Active    Future Appointments    Date/Time Provider Specialty Site   09/06/2016 01:00 PM Viviane Cockayne, M D   Neurology [facility]   05/16/2016 02:00 PM Jared Sullivan MD Urology [facility]     Signatures   Electronically signed by : IVORY Molina ; Jan 24 2016  9:02PM EST                       (Author)

## 2018-01-13 VITALS
HEART RATE: 90 BPM | TEMPERATURE: 97.9 F | HEIGHT: 66 IN | SYSTOLIC BLOOD PRESSURE: 148 MMHG | RESPIRATION RATE: 16 BRPM | DIASTOLIC BLOOD PRESSURE: 70 MMHG

## 2018-01-13 VITALS
RESPIRATION RATE: 16 BRPM | DIASTOLIC BLOOD PRESSURE: 67 MMHG | HEIGHT: 66 IN | HEART RATE: 90 BPM | WEIGHT: 175 LBS | BODY MASS INDEX: 28.12 KG/M2 | SYSTOLIC BLOOD PRESSURE: 138 MMHG

## 2018-01-13 VITALS — RESPIRATION RATE: 18 BRPM | SYSTOLIC BLOOD PRESSURE: 164 MMHG | DIASTOLIC BLOOD PRESSURE: 79 MMHG | HEART RATE: 89 BPM

## 2018-01-13 VITALS
DIASTOLIC BLOOD PRESSURE: 72 MMHG | HEIGHT: 66 IN | BODY MASS INDEX: 28.12 KG/M2 | WEIGHT: 175 LBS | HEART RATE: 84 BPM | SYSTOLIC BLOOD PRESSURE: 138 MMHG

## 2018-01-13 NOTE — PROGRESS NOTES
Assessment   1  Multiple sclerosis (340) (G35)   2  Aneurysm (442 9) (I72 9)   3  Neurogenic bladder (596 54) (N31 9)    Plan   Multiple sclerosis    · Baclofen 10 MG Oral Tablet; take 1 tablet daily as needed   Rx By: Ngoc Zarate; Dispense: 90 Days ; #:90 Tablet; Refill: 1;For: Multiple sclerosis; THANIA = N; Verified Transmission to [id number] Last Updated By: [employer]; 1/12/2018 3:07:39 PM    Discussion/Summary   Discussion Summary:    Patient is here for follow up had updated MRI t-spine due to increased weakness in the LEs  MRI t-spine updated 12/7/17 and was stable  He has chronic demyelination/cord atrophy from T3-T5/6 also had updated MRA in November  He is s/p basilar apex aneurysm stent assisted coiling by Dr Gregorio Henriquez in 2015  He saw Dr Reji Chowdhury following the MRA and this was felt to be stable  There had been mention in the past of a possible residual 2mm outpouching, but Dr Reji Chowdhury did not appreciate this  He will repeat the imaging in 1 year and then follow up with NS  He will cont ASA follows with urology yearly regarding suprapubic catheter  just finished some in-home PT  Encouraged him to cont with stretching and exercises on his own he can take his gabapentin TID as written  He is only taking BID also renew baclofen, which he can start taking at bedtime  him to make an appt with his ophthalmologist  He is having some headaches caused by eye strain  May need refraction  is stable today up in 4 months or sooner if needed for any new or worsening symptoms  Counseling Documentation With Imm: The patient, patient's family was counseled regarding diagnostic results,-- instructions for management,-- risk factor reductions,-- prognosis,-- patient and family education,-- impressions,-- risks and benefits of treatment options,-- importance of compliance with treatment  total time of encounter was 25 minutes-- and-- >50% minutes was spent counseling        Chief Complaint   Chief Complaint Free Text Note Form: Patient present for follow up appt regarding MS  History of Present Illness   HPI: 76 y/o male presents with brother for neurologic follow up, last seen in May 2017  Patient with PMH of MS diagnosed many years ago, DM and HTN, glaucoma  Patient diagnosed with probable MS in the 1960s  Actually unclear diagnosis from time of presentation to presentation to our center in 2014  Patient recalls at age of 11 having chest pain and then numbness of the left toes tingling up the leg to mid chest around T6 and then down the right side in similar distribution to the right foot  Patient had loss of control of bowel and bladder at that time  He had no ability to walk and was in the hospital for over 6 months  He was only able to ambulate with crutches in his 20s, 30s, 40s, etc  Patient still uses [place] crutches as well as a wheelchair to ambulate  Patient was only ever given ACTH in the past  He was never on IMD meds prior to coming to our center in 2014  He was told he may have had a CVA that affected his walking  Patient with suprapubic catheter placed by Dr Ignacia Huitron  Labs unremarkable, NMO negative  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  MRI c-spine did not reveal any lesions  MRI t-spine reveals cord lesion from T3-5; no comparison on file  Patient had last updated MRI c spine done on May 5 2015  Relatively stable appearance of the cervical spine  Mild to mod canal stenosis from c3-c7 inclusive  Minor flattening of the normal signal cord at c4/5 and c3/4  Varying degrees of multi level foraminal stenosis  Pt has been seen by Dr Neelima Echeverria for eval of significant c-spine disease  No surgery indicated  Pt with longstanding dizziness; he has strong family history of brain aneurysms- his sister had 3 aneurysms, 1 niece with 3 aneurysms, 1 niece who passed away from aneurysm and 1 nephew with aneurysm; pt's father with aneurysm in his abdominal area   He had CTA in January 2015, 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  He was seen neuro-surg and underwent 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 in March 2015 by Dr Cosme Brewer of neuro-surg  visit in Sept 2016, patient reported an acute increase in blurriness of vision starting 2 weeks prior to the vision  He has had strabismus since youth and diplopia in the past  Our office coordinated with Dr Sorin Lamar and patient was fit in to see him the next day  It was found he was starting to develop cataracts and also had some optic nerve head drusen  MRI and MRA head were ordered due to new visual symptoms  MRI brain 9/19/16 with mild age-appropriate volume loss  Mild white matter disease, stable compared to 12/2014  MRA head 9/19/16 possible 2mm outpouching arising from the posterior aspect of the basilar tip at the origin of the left posterior cerebral artery  This may represent a small residual aneurysm  Consider re-eval with endovascular neurosurgery  Case was discussed at the neurovascular working group  Dr Cosme Brewer is no longer with [facility] was able to review the case  He did not suggest any follow up with neurosurgery at this time and to repeat the imaging 2 years from Jan 2016 imaging  patient reports he is overall doing well  He denies any new neurologic symptoms  His biggest complaint is still pain in the back and legs  He is only taking gabapentin BID, but it is prescribed TID  He had also stopped his Baclofen last year  At his last visit here in Nov 2017, he reported a fall over the summer  He noted increased heaviness in the legs  MRI t-spine was updated 12/7/17 and compared to 8/2015  Changes related to chronic demyelinating disease in the upper cord or cord volume loss most notably from T3 through the 5-6 interspace  No enhancement  No significant change from the prior study  MRA head 11/24/17 s/p stent assisted coiling of basilar tip aneurysm   Patient saw Dr Missy Mckeon from neurosurgery after this MRA on 11/29/17  He felt MRA was overall stable and did not appreciate the possible 2mm outpouching that was mentioned on last MRA  He agreed with the continuation of ASA  Patient reports his vision is stable and he is following with optho every 3-4 months now  Denies changes in bowel or bladder, speech or swallowing  He has not had any falls  He is using his wheelchair  No HA, zoning, LOC  no loss of vision  Review of Systems   Neurological ROS:      Constitutional: no fever, no chills, no recent weight gain, no recent weight loss, no complaints of feeling tired, no changes in appetite  HEENT: blurred vision  Cardiovascular:  no chest pain or pressure, no palpitations present, the heart rate was not rapid or irregular, no swelling in the arms or legs, no poor circulation  Respiratory:  no unusual or persistant cough, no shortness of breath with or without exertion  Gastrointestinal:  no nausea, no vomiting, no diarrhea, no abdominal pain, no changes in bowel habits, no melena, no loss of bowel control  Genitourinary:  no incontinence, no feelings of urinary urgency, no increase in frequency, no urinary hesitancy, no dysuria, no hematuria  Musculoskeletal: myalgias,-- head/neck/back pain-- and-- pain while walking  Integumentary rash: [facility]: anxiety  Endocrine  no unusual weight loss or gain, no excessive urination, no excessive thirst, no hair loss or gain, no hot or cold intolerance, no menstrual period change or irregularity, no loss of sexual ability or drive, no erection difficulty, no nipple discharge  Hematologic/Lymphatic:  no unusual bleeding, no tendency for easy bruising, no clotting skin or lumps  Neurological General: headache-- and-- lightheadedness  Neurological Mental Status: memory problems  Neurological Cranial Nerves: blurry or double vision-- and-- vertigo or dizziness        Neurological Motor findings include: tremor  Neurological Coordination: balance difficulties  Neurological Sensory: numbness  Neurological Gait: difficulty walking  ROS Reviewed:    ROS reviewed  Active Problems   1  Abdomen Suprapubic Catheter   2  Acute UTI (599 0) (N39 0)   3  Aneurysm (442 9) (I72 9)   4  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)   5  Atopic dermatitis (691 8) (L20 9)   6  Benign colon polyp (211 3) (K63 5)   7  Benign prostatic hypertrophy (600 00) (N40 0)   8  Bilateral shoulder pain (719 41) (M25 511,M25 512)   9  BPH with obstruction/lower urinary tract symptoms (600 01,599 69) (N40 1,N13 8)   10  Bursitis of left shoulder (726 10) (M75 52)   11  Bursitis of right shoulder (726 10) (M75 51)   12  Cellulitis of right leg without foot (682 6) (L03 115)   13  Cellulitis of right lower leg (682 6) (L03 115)   14  Cervical spinal stenosis (723 0) (M48 02)   15  Depression (311) (F32 9)   16  Diabetic neuropathy (250 60,357 2) (E11 40)   17  Dyslipidemia (272 4) (E78 5)   18  Encounter for long-term (current) use of medications (V58 69) (Z79 899)   19  Esophageal reflux (530 81) (K21 9)   20  Fatigue (780 79) (R53 83)   21  Fatty liver (571 8) (K76 0)   22  Generalized anxiety disorder (300 02) (F41 1)   23  Glaucoma (365 9) (H40 9)   24  Headache (784 0) (R51)   25  Hiatal hernia (553 3) (K44 9)   26  Hyperlipidemia (272 4) (E78 5)   27  Hypertension (401 9) (I10)   28  Influenza (487 1) (J11 1)   29  Lower extremity cellulitis (682 6) (L03 119)   30  Lower extremity weakness (729 89) (R29 898)   31  Multiple sclerosis (340) (G35)   32  Neuralgia (729 2) (M79 2)   33  Neurogenic bladder (596 54) (N31 9)   34  Obstructive sleep apnea (327 23) (G47 33)   35  Other diseases of vocal cords (478 5) (J38 3)   36  Other spondylosis with myelopathy, cervical region (721 1) (M47 12)   37  Palpitations (785 1) (R00 2)   38  Preoperative cardiovascular examination (V72 81) (Z01 810)   39  Reactive airway disease (493 90) (J45 909)   40  Shortness of breath (786 05) (R06 02)   41  Thyroid nodule (241 0) (E04 1)   42  Tinea corporis (110 5) (B35 4)   43  Type 2 diabetes mellitus (250 00) (E11 9)   44  Urinary retention (788 20) (R33 9)   45  UTI (urinary tract infection) (599 0) (N39 0)   46  Visit for pre-operative examination (V72 84) (Z01 818)   47  Vitamin D deficiency (268 9) (E55 9)    Past Medical History   1  Acute bronchitis (466 0) (J20 9)   2  History of Acute laryngitis (464 00) (J04 0)   3  History of Acute nonsuppurative otitis media, unspecified laterality (381 00) (H65 199)   4  History of Arm weakness (729 89) (R29 898)   5  History of Arthritis (V13 4)   6  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)   7  History of Bronchitis (490) (J40)   8  History of Cough (786 2) (R05)   9  History of Diabetes Mellitus (250 00)   10  History of Dysfunction of Eustachian tube, unspecified laterality (381 81) (H69 80)   11  History of Erectile dysfunction of non-organic origin (302 72) (F52 21)   12  History of acute bronchitis (V12 69) (Z87 09)   13  History of bladder infections (V13 02) (Z87 440)   14  History of constipation (V12 79) (Z87 19)   15  History of dizziness (V13 89) (Z87 898)   16  History of fatigue (V13 89) (Z87 898)   17  History of fatigue (V13 89) (Z87 898)   18  History of fatigue (V13 89) (Z87 898)   19  History of Imbalance (781 2) (R26 89)   20  History of Leg muscle spasm (728 85) (M62 838)   21  History of Memory problem (780 93) (R41 3)   22  History of Nephrolithiasis (V13 01)   23  History of No natural teeth (520 0) (K00 0)   24  History of Sinus pain (478 19) (J34 89)   25  History of Strain of thoracic region (847 1) (S29 019A)   26  History of Wears eyeglasses (V46 89) (Z97 3)  Active Problems And Past Medical History Reviewed: The active problems and past medical history were reviewed and updated today  Surgical History   1   History of Diagnostic Cystoscopy   2  History of Diagnostic Cystoscopy   3  History of Myringotomy - With Ventilating Tube Insertion   4  History of Transscleral Cyclophotocoagulation Noncontact YAG Laser  Surgical History Reviewed: The surgical history was reviewed and updated today  Family History   Mother    1  Family history of Coronary Artery Disease (V17 49)   2  Family history of    3  Family history of Heart problem   4  Family history of Macular Degeneration   5  Family history of Type 2 Diabetes Mellitus  Father    6  Family history of    7  Family history of Heart problem  Sister    8  Family history of Brain aneurysm   9  Family history of Malignant Pancreatic Neoplasm  Maternal Grandmother    10  Family history of Cancer   11  Family history of   Paternal Grandmother    15  Family history of   Maternal Grandfather    15  Family history of   Paternal Grandfather    15  Family history of   Family History Reviewed: The family history was reviewed and updated today  Social History    · Assistive Devices: Wheelchair   · Drinks coffee   · Education Level: Less than high school   · Former smoker (Z20 62) (A03 052)   · Functioning activity level   · Lives with relatives   · Marital History - Single   · Never Drank Alcohol   · No alcohol use   · No caffeine use   · No drug use   · Not currently employed   · Single  Social History Reviewed: The social history was reviewed and updated today  Current Meds    1  ALPRAZolam 0 25 MG Oral Tablet; TAKE 1 TABLET 3 TIMES DAILY FOR ANXIETY; Last     Rx:2017 Ordered   2  AmLODIPine Besylate 10 MG Oral Tablet; TAKE 1 TABLET DAILY; Therapy: 23UTE3947 to (Rc Chavez)  Requested for: 04OLS9146; Last     Rx:2018 Ordered   3  Aspirin  MG Oral Tablet Delayed Release; Take 1 tablet daily; Therapy: 84UHT2885 to (Evaluate:2018)  Requested for: 35Ing5904; Last     Rx:66Bri6098 Ordered   4  Atorvastatin Calcium 20 MG Oral Tablet; TAKE 1 TABLET DAILY FOR CHOLESTEROL; Therapy: 55Nns3885 to (Evaluate:61Nvx6030)  Requested for: 56BTG9698; Last     Rx:60Tss5172 Ordered   5  Baclofen 10 MG Oral Tablet; TAKE 0 5 TABLET DAILY AT 2PM AND MAY TAKE ADDITIONAL     0 5 TABLET DAILYAS NEEDED; Therapy: 70ZGJ4167 to (Evaluate:12Mar2017)  Requested for: 64Bqd6685; Last     Rx:14Kdf1758 Ordered   6  Furosemide 40 MG Oral Tablet; TAKE 1 TABLET DAILY  Requested for: 90EIL8460; Last     Rx:15Mmy8549 Ordered   7  Gabapentin 300 MG Oral Capsule; TAKE 1 CAPSULE 3 TIMES DAILY; Therapy: 50ICP3039 to (Evaluate:15Mar2018)  Requested for: 86LCQ3754; Last     Rx:21Vey6039 Ordered   8  Losartan Potassium 50 MG Oral Tablet; TAKE 1 TABLET DAILY; Therapy: 88FRS9290 to (Evaluate:20Oct2018)  Requested for: 25Oct2017; Last     MJ:93EXQ2305 Ordered   9  MetFORMIN HCl - 500 MG Oral Tablet; TAKE 1 TABLET twice a day with meals      Requested for: 12OMD3875; Last Rx:63Bru4871 Ordered   10  OneTouch Ultra 2 w/Device Kit; Therapy: 61HTK9855 to (Last Rx:15Mar2011)  Requested for: 72SDE9778 Ordered   11  OneTouch Ultra Blue In [employer]; test 3 times daily; Therapy: 84FIR9153 to (FJDNKM:10JVJ4390)  Requested for: 25Oct2017; Last      TO:13EMM6560 Ordered   12  OneTouch UltraSoft Lancets Miscellaneous; TEST TWICE A DAY; Therapy: 78TWH4639 to (Last OQ:34YGS9697)  Requested for: 25Oct2017 Ordered   13  Pantoprazole Sodium 40 MG Oral Tablet Delayed Release; TAKE 1 TABLET TWICE DAILY      30 MINUTES BEFORE BREAKFAST AND DINNER  Requested for: 33Vxo8705; Last      Rx:57Mve8459 Ordered   14  Propranolol HCl ER 60 MG Oral Capsule Extended Release 24 Hour; take 1 capsule      daily; Therapy: 44ONS6198 to (Evaluate:11Mar2018)  Requested for: 87SOL0128; Last      Rx:17Mar2017 Ordered  Medication List Reviewed: The medication list was reviewed and updated today  Allergies   1   No Known Drug Allergies    Vitals Signs   Recorded: 12Jan2018 02:41PM   Heart Rate: 95  Systolic: 878  Diastolic: 65  Height: 5 ft 6 in  Weight Unobtainable: Yes  O2 Saturation: 97    Physical Exam        Neurologic      Orientation to person, place, and time: Normal        Recent and remote memory: Demonstrates normal memory  Attention span and concentration: Normal thought process and attention span  Language: Names objects, able to repeat phrases and speaks spontaneously  Fund of knowledge: Normal vocabulary with appropriate knowledge of current events and past history  Constitutional      General Appearance: Appears appropriate for age, healthy, well developed, appropriately groomed and appropriately dressed       Head and Face      Head and face: Atraumatic on inspection  Facial strength normal           Pulmonary      Respiratory effort: Lungs are clear bilaterally         Musculoskeletal      Gait and station: Abnormal  -- in wheelchair  Muscle strength: Abnormal        Motor Strength:  the patient is right hand dominant  -- the patient was paraplegic  Strength examination:      Biceps strength was 4+/5 on the right side-- and-- 4+/5 on the left side  Triceps strength was 4+/5 on the right side-- and-- 4+/5 on the left side  Shoulder flexion was 4/5 on the right side-- and-- 4/5 on the left side  Foot Plantar Flexion: 2/5 on the right side and 2/5 on the left side  Foot Dorsiflexion: 1/5 on the right side and 1/5 on the left side  Hip Flexion: 3/5 on the right side and 3/5 on the left side  Muscle tone: Abnormal  -- clonus B/L LE  Involuntary movements: None observed  Neurologic      Mental Status: Mood is normal  Affect is normal  Memory is intact  (Concentration) No apparent agnosia present  Thought process appears clear and appropriate  Reflexes: Abnormal  -- Brisk 4+, clonus  Coordination: Abnormal  -- slower WILLIAM on RUE        Sensation: Abnormal  -- (decreased vibration left side vs right, absent vibration at achilles B/L  decreased temperature right UE)      Judgment and insight: Normal        Cranial Nerve Exam      2nd cranial nerve: Abnormal  -- Double vision in left visual fields  3rd, 4th, and 6th cranial nerve: Abnormal  -- Left eye medially deviated, alternating esotropia  VAbel Champ with no deficitl  VII: Normal with no deficit  VIII: Normal with no deficit  IV: Normal with no deficit  XI: Normal with no deficit  XII: Normal with no deficit  Constitutional      General appearance: No acute distress, well appearing and well nourished  Eyes      Conjunctiva and lids: No erythema, swelling or discharge  Recent and remote memory: Intact  Mood and affect: Normal        Results/Data   Diagnostic Studies Reviewed: I personally reviewed the films/images/results in the office today  My interpretation follows  Attending Note   Collaborating Physician Note: Collaborating Note: I agree with the Advanced Practitioner note  Future Appointments      Date/Time Provider Specialty Site   11/28/2018 01:00 PM IVORY Hurd   Neurosurgery [facility]   05/21/2018 01:00 PM Jared Sullivan MD Urology [address] [place]     Signatures    Electronically signed by : Sharda Winters, [facility]; Jan 12 2018  3:32PM EST                       (Author)     Electronically signed by : IVORY Marie ; Jan 12 2018  4:37PM EST                       (Co-author)

## 2018-01-13 NOTE — PROGRESS NOTES
Assessment    1  History of fatigue (V13 89) (Z87 898)   2  Fatigue (780 79) (R53 83)   3  Type 2 diabetes mellitus (250 00) (E11 9)   4  Neurogenic bladder (596 54) (N31 9)   5  UTI (urinary tract infection) (599 0) (N39 0)    Plan  Multiple sclerosis    · *1 - [facility]  Consult  Status: Hold For - Scheduling   Requested for: 17CVF7236  Care Summary provided  : Yes  PMH: Diabetes Mellitus    · OneTouch UltraSoft Lancets Miscellaneous; TEST TWICE A DAY  Type 2 diabetes mellitus    · (Q) CBC (H/H, RBC, INDICES, WBC, PLT); Status:Hold For - Exact Date; Requested  for: In Office Collection;    · (Q) COMPREHENSIVE METABOLIC PNL W/ADJUSTED CALCIUM; Status:Hold For -  Exact Date; Requested for: In Office Collection;    · (Q) CULTURE, URINE, ROUTINE; Status:Hold For - Exact Date; Requested for: In Office  Collection;    · (Q) HEMOGLOBIN A1c WITH eAG; Status:Hold For - Exact Date; Requested for: In Office  Collection;    · (Q) LIPID PANEL WITH DIRECT LDL; Status:Hold For - Exact Date; Requested for: In  Office Collection;    · (Q) MICROALBUMIN, RANDOM URINE (W/CREATININE); Status:Hold For - Exact Date; Requested for: In [facility];   UTI (urinary tract infection)    · Levofloxacin 500 MG Oral Tablet; TAKE 1 TABLET DAILY AS DIRECTED   · Follow Up if Not Better Evaluation and Treatment  Follow-up  Status: Complete  Done:  [id number]    Chief Complaint  Blood sugar is high   Sore throat       History of Present Illness  HPI: Patient with occasional congestion  Also notes occasional fatigue and tiredness  He does have a suprapubic catheter for urination  He has not done physical therapy recently but states he does benefit from physical therapy for the multiple sclerosis      Review of Systems    Constitutional: as noted in HPI    ENT: as noted in HPI  Cardiovascular: no complaints of slow or fast heart rate, no chest pain, no palpitations, no leg claudication or lower extremity edema  Respiratory: no complaints of shortness of breath, no wheezing or cough, no dyspnea on exertion, no orthopnea or PND  Gastrointestinal: no complaints of abdominal pain, no constipation, no nausea or vomiting, no diarrhea or bloody stools  Genitourinary: as noted in HPI  Musculoskeletal: no complaints of arthralgia, no myalgia, no joint swelling or stiffness, no limb pain or swelling  Integumentary: no complaints of skin rash or lesion, no itching or dry skin, no skin wounds  Neurological: no complaints of headache, no confusion, no numbness or tingling, no dizziness or fainting  Active Problems    1  Aneurysm (442 9) (I72 9)   2  Benign colon polyp (211 3) (K63 5)   3  Benign prostatic hypertrophy (600 00) (N40 0)   4  Bilateral shoulder pain (719 41) (M25 511,M25 512)   5  BPH with obstruction/lower urinary tract symptoms (600 01) (N40 1)   6  Bursitis of left shoulder (726 10) (M75 52)   7  Bursitis of right shoulder (726 10) (M75 51)   8  Cellulitis of right leg without foot (682 6) (L03 115)   9  Cervical spinal stenosis (723 0) (M48 02)   10  Depression (311) (F32 9)   11  Dyslipidemia (272 4) (E78 5)   12  Encounter for long-term (current) use of medications (V58 69) (Z79 899)   13  Esophageal reflux (530 81) (K21 9)   14  Fatty liver (571 8) (K76 0)   15  Generalized anxiety disorder (300 02) (F41 1)   16  Glaucoma (365 9) (H40 9)   17  Headache (784 0) (R51)   18  Hiatal hernia (553 3) (K44 9)   19  Hyperlipidemia (272 4) (E78 5)   20  Hypertension (401 9) (I10)   21  Influenza (487 1) (J11 1)   22  Multiple sclerosis (340) (G35)   23  Neuralgia (729 2) (M79 2)   24  Neurogenic bladder (596 54) (N31 9)   25  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)   26  Obstructive sleep apnea (327 23) (G47 33)   27  Other spondylosis with myelopathy, cervical region (721 1) (M47 12)   28  Preoperative cardiovascular examination (V72 81) (Z01 810)   29  Reactive airway disease (306 31) (J93 901)   30   Shortness of breath (786 05) (R06 02)   31  Thyroid nodule (241 0) (E04 1)   32  Type 2 diabetes mellitus (250 00) (E11 9)   33  Urinary retention (788 20) (R33 9)   34  UTI (urinary tract infection) (599 0) (N39 0)   35  Visit for pre-operative examination (V72 84) (Z01 818)   36  Vitamin D deficiency (268 9) (E55 9)    Past Medical History    1  History of Acute laryngitis (464 00) (J04 0)   2  History of Acute nonsuppurative otitis media, unspecified laterality (381 00) (H65 199)   3  History of Arm weakness (729 89) (M62 81)   4  History of Arthritis (V13 4)   5  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)   6  History of Bronchitis (490) (J40)   7  History of Cough (786 2) (R05)   8  History of Diabetes Mellitus (250 00)   9  History of Dysfunction of eustachian tube, unspecified laterality (381 81) (H69 80)   10  History of Erectile dysfunction of non-organic origin (302 72) (F52 21)   11  History of acute bronchitis (V12 69) (Z87 09)   12  History of bladder infections (V13 02) (Z87 440)   13  History of constipation (V12 79) (Z87 19)   14  History of dizziness (V13 89) (Z87 898)   15  History of fatigue (V13 89) (Z87 898)   16  History of fatigue (V13 89) (Z87 898)   17  History of fatigue (V13 89) (Z87 898)   18  History of Imbalance (781 2) (R26 89)   19  History of Leg muscle spasm (728 85) (M62 838)   20  History of Memory problem (780 93) (R41 3)   21  History of Nephrolithiasis (V13 01)   22  History of No natural teeth (520 0) (K00 0)   23  History of Sinus pain (478 19) (J34 89)   24  History of Strain of thoracic region (847 1) (S29 012A)   25  History of Wears eyeglasses (V49 89) (Z97 3)    Family History    1  Family history of Coronary Artery Disease (V17 49)   2  Family history of    3  Family history of Heart problem   4  Family history of Macular Degeneration   5  Family history of Type 2 Diabetes Mellitus    6  Family history of    7  Family history of Heart problem    8   Family history of Brain aneurysm   9  Family history of Malignant Pancreatic Neoplasm    10  Family history of Cancer   11  Family history of     15  Family history of     15  Family history of     15  Family history of     Social History    · Assistive Devices: Wheelchair   · Drinks coffee   · Education Level: Less than high school   · Former smoker (V34 52) (S62 659)   · Quit smoking 35 yrs ago ()   Had started smoking at the age of 12, a pack to a pack      and a half of cigarettes a day  · Functioning activity level   · does not participate in activities outside of the home; participates in sedentary/light      activities inside of the home   · Lives with relatives   · Marital History - Single   · Never Drank Alcohol   · No alcohol use   · No caffeine use   · No drug use   · Not currently employed   · Single  The social history was reviewed and updated today  The social history was reviewed and is unchanged  Surgical History    1  History of Diagnostic Cystoscopy   2  History of Myringotomy - With Ventilating Tube Insertion   3  History of Transscleral Cyclophotocoagulation Noncontact YAG Laser  Surgical History Reviewed: The surgical history was reviewed and updated today  Current Meds   1  ALPRAZolam 0 25 MG Oral Tablet; TAKE 1 TABLET 3 times daily; Last Rx:55Fwz3763   Ordered   2  AmLODIPine Besylate 10 MG Oral Tablet; TAKE 1 TABLET DAILY; Therapy: 88IQS2953 to (Evaluate:03Zsb4965)  Requested for: 52Cwb2124; Last   Rx:33Awt0510 Ordered   3  Aspirin  MG Oral Tablet Delayed Release; TAKE 1 TABLET BY MOUTH EACH   DAY; Therapy: (Recorded:58Gjc6625) to Recorded   4  Aspirin  MG Oral Tablet Delayed Release; Take 1 tablet daily; Therapy: 54VHS1504 to (Roger Arce)  Requested for: 45Dif0814; Last   Rx:86Gsx4152; Status: ACTIVE - Retrospective By Protocol Authorization Ordered   5   Atorvastatin Calcium 20 MG Oral Tablet; TAKE 1 TABLET DAILY FOR CHOLESTEROL; Therapy: 89Vqk1646 to (Evaluate:23Oct2016)  Requested for: 29Oct2015; Last   Rx:68Dmd2488 Ordered   6  Baclofen 10 MG Oral Tablet; TAKE 0 5 TABLET Daily at 2pm and may take additional 0 5   tablet daily PRN  Requested for: 27XIE2422; Last Rx:01Oct2015 Ordered   7  Gabapentin 300 MG Oral Capsule; 1 bid, 2 hs; Therapy: 87BHJ7034 to (Evaluate:92Xrv0823)  Requested for: 12Jan2016; Last   Rx:12Jan2016 Ordered   8  Losartan Potassium 50 MG Oral Tablet; TAKE 1 TABLET DAILY; Therapy: 77PXD2722 to (Evaluate:40Lbm7112)  Requested for: 13Adg0661; Last   Rx:26Rso2591 Ordered   9  MetFORMIN HCl - 500 MG Oral Tablet; 1 tablet daily; Therapy: 06Nrv2477 to (Evaluate:13Lrz4750)  Requested for: 23Mjk2777; Last   Rx:44Ctj4023 Ordered   10  OneTouch Ultra 2 w/Device Kit; Therapy: 78LZZ6814 to (Last Rx:15Mar2011)  Requested for: 37BWX8316 Ordered   11  OneTouch Ultra Blue In [employer]; test 3 times daily; Therapy: 08OMQ8073 to (Evaluate:18Nov2015)  Requested for: 46Pyr7611; Last    Rx:86Dqy2055 Ordered   12  OneTouch UltraSoft Lancets Miscellaneous; TEST TWICE A DAY; Therapy: 84UDX9292 to (Last Brand Piotr)  Requested for: 87Qqw4011 Ordered   13  Pantoprazole Sodium 40 MG Oral Tablet Delayed Release; TAKE 1 TABLET TWICE    DAILY 30 MINUTES BEFORE BREAKFAST AND DINNER  Requested for: 15ZQK2473; Last Rx:14Oct2015 Ordered   14  Propranolol HCl ER 60 MG Oral Capsule Extended Release 24 Hour; take 1 capsule    daily; Therapy: 33YRR4911 to ([facility] [place])  Requested for: 21BDW4790; Last    Rx:14Oct2015 Ordered    Allergies    1  No Known Drug Allergies  Denied    2  Anesthesia Extension Tubing Miscellaneous    Vitals   Recorded: [id number] 01:58PM   Temperature 57 8 F   Systolic 942   Diastolic 70     Physical Exam    Constitutional   General appearance: No acute distress, well appearing and well nourished  Eyes   Conjunctiva and lids: No swelling, erythema, or discharge      Pupils and irises: Equal, round and reactive to light  Ears, Nose, Mouth, and Throat   External inspection of ears and nose: Normal     Otoscopic examination: Tympanic membrance translucent with normal light reflex  Canals patent without erythema  Nasal mucosa, septum, and turbinates: Normal without edema or erythema  Oropharynx: Normal with no erythema, edema, exudate or lesions  Pulmonary   Respiratory effort: No increased work of breathing or signs of respiratory distress  Auscultation of lungs: Clear to auscultation, equal breath sounds bilaterally, no wheezes, no rales, no rhonci  Cardiovascular   Palpation of heart: Normal PMI, no thrills  Auscultation of heart: Normal rate and rhythm, normal S1 and S2, without murmurs  Examination of extremities for edema and/or varicosities: Normal     Carotid pulses: Normal     Abdomen   Abdomen: Non-tender, no masses  Liver and spleen: No hepatomegaly or splenomegaly  Lymphatic   Palpation of lymph nodes in neck: No lymphadenopathy  Musculoskeletal   Gait and station: Abnormal   Patient is able to be ambulatory with crutches presents in a wheelchair today  Digits and nails: Normal without clubbing or cyanosis  Inspection/palpation of joints, bones, and muscles: Normal     Skin   Skin and subcutaneous tissue: Normal without rashes or lesions  Neurologic   Cranial nerves: Cranial nerves 2-12 intact  Reflexes: 2+ and symmetric  Sensation: No sensory loss  Psychiatric   Orientation to person, place and time: Normal     Mood and affect: Normal          Future Appointments    Date/Time Provider Specialty Site   09/06/2016 01:00 PM IVORY Bailey   Neurology [facility]   05/16/2016 02:00 PM Delphina Canavan, MD Urology [address]   Electronically signed by : Jorge Antoine DO; Jan 17 2016  4:26PM EST                       (Author)

## 2018-01-13 NOTE — RESULT NOTES
Verified Results  * MRA HEAD WO CONTRAST [id number] 03:19PM Doc Espitia     Test Name Result Flag Reference   MRA HEAD WO CONTRAST (Report)     MRA BRAIN     INDICATION: Headaches  Follow-up aneurysm repair  COMPARISON: 1/5/2015  TECHNIQUE: Axial 3-D time-of-flight imaging with 3-D reconstructions  FINDINGS:     IMAGE QUALITY: Diagnostic  ANATOMY     INTERNAL CAROTID ARTERIES: Normal flow related enhancement of the distal cervical, petrous and cavernous segments of the internal carotid arteries  Normal ICA terminus  ANTERIOR CIRCULATION: Normal A1 segments  Normal anterior communicating artery  Normal flow-related enhancement of the anterior cerebral arteries  MIDDLE CEREBRAL ARTERY CIRCULATION: The M1 segment and middle cerebral artery branches demonstrate normal flow-related enhancement  DISTAL VERTEBRAL ARTERIES: Distal vertebral arteries are patient with a normal vertebrobasilar junction  The posterior inferior cerebellar artery origins are normal       BASILAR ARTERY: Patient is undergone previous basilar stent placement 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  There appears to be a stent involving the left P1 segment as well  This limits flow-related enhancement  The proximal and    midportion of the basilar artery are unremarkable  The distal portion appears severely narrowed but is likely related to the artifact from known stent  POSTERIOR CEREBRAL ARTERIES: Normal] posterior cerebral artery with no visualized posterior communicating artery  The left proximal posterior cerebral artery demonstrates at least moderate narrowing with decreased flow-related enhancement, likely    related to known stent rather than true disease  ANEURYSM OR VASCULAR MALFORMATION: No aneurysm identified at this time  Previous basilar tip aneurysm coiling  No evidence of residual or recurrent aneurysm       Decreased caliber of the mid to distal aspect of the basilar artery and the left P1 segment likely related to artifact from known stents  Consider contrast-enhanced MR angiography as part of future follow-ups

## 2018-01-14 VITALS
HEART RATE: 84 BPM | RESPIRATION RATE: 18 BRPM | WEIGHT: 195 LBS | SYSTOLIC BLOOD PRESSURE: 149 MMHG | HEIGHT: 66 IN | BODY MASS INDEX: 31.34 KG/M2 | DIASTOLIC BLOOD PRESSURE: 70 MMHG

## 2018-01-14 VITALS — TEMPERATURE: 96.6 F | DIASTOLIC BLOOD PRESSURE: 78 MMHG | SYSTOLIC BLOOD PRESSURE: 140 MMHG

## 2018-01-14 NOTE — RESULT NOTES
Message   labwork looks ok     Verified Results  (1) CBC/PLT/DIFF 87DUB8311 12:00AM [employer]     Test Name Result Flag Reference   WHITE BLOOD CELL COUNT 10 8 Thousand/uL  3 8-10 8   RED BLOOD CELL COUNT 5 56 Million/uL  4 20-5 80   HEMOGLOBIN 15 7 g/dL  13 2-17 1   HEMATOCRIT 48 7 %  38 5-50 0   MCV 87 6 fL  80 0-100 0   MCH 28 2 pg  27 0-33 0   MCHC 32 2 g/dL  32 0-36 0   RDW 14 7 %  11 0-15 0   PLATELET COUNT 403 Thousand/uL  140-400   MPV 7 5 fL  7 5-11 5   ABSOLUTE NEUTROPHILS 6685 cells/uL  0328-6432   ABSOLUTE LYMPHOCYTES 2959 cells/uL  850-3900   ABSOLUTE MONOCYTES 648 cells/uL  200-950   ABSOLUTE EOSINOPHILS 421 cells/uL     ABSOLUTE BASOPHILS 86 cells/uL  0-200   NEUTROPHILS 61 9 %     LYMPHOCYTES 27 4 %     MONOCYTES 6 0 %     EOSINOPHILS 3 9 %     BASOPHILS 0 8 %       (1) URINALYSIS w URINE C/S REFLEX (will reflex a microscopy if leukocytes, occult blood, or nitrites are not within normal limits) 84ZMW3616 12:00AM [employer]     Test Name Result Flag Reference   COLOR TNP     *************************************   * Test Not Performed  *   * Improper specimen submitted  *   * Please re-submit in a Zander   *   * yellow-top urinalysis transport   *   * tube                               *   *************************************

## 2018-01-15 NOTE — RESULT NOTES
Discussion/Summary   Blood work looks okay  Verified Results  (1) COMPREHENSIVE METABOLIC PANEL 04UWH1195 13:40IL [employer] Order Number: [id number]     Test Name Result Flag Reference   SODIUM 135 mmol/L L 136-145   POTASSIUM 4 0 mmol/L  3 5-5 3   CHLORIDE 99 mmol/L L 100-108   CARBON DIOXIDE 25 mmol/L  21-32   ANION GAP (CALC) 11 mmol/L  4-13   BLOOD UREA NITROGEN 8 mg/dL  5-25   CREATININE 0 93 mg/dL  0 60-1 30   Standardized to IDMS reference method   CALCIUM 9 5 mg/dL  8 3-10 1   BILI, TOTAL 0 60 mg/dL  0 20-1 00   ALK PHOSPHATAS 94 U/L     ALT (SGPT) 22 U/L  12-78   Specimen collection should occur prior to Sulfasalazine and/or Sulfapyridine administration due to the potential for falsely depressed results  AST(SGOT) 14 U/L  5-45   Specimen collection should occur prior to Sulfasalazine administration due to the potential for falsely depressed results  ALBUMIN 3 8 g/dL  3 5-5 0   TOTAL PROTEIN 7 6 g/dL  6 4-8 2   eGFR 84 ml/min/1 73sq m     National Kidney Disease Education Program recommendations are as follows:  GFR calculation is accurate only with a steady state creatinine  Chronic Kidney disease less than 60 ml/min/1 73 sq  meters  Kidney failure less than 15 ml/min/1 73 sq  meters  GLUCOSE FASTING 139 mg/dL H 65-99   Specimen collection should occur prior to Sulfasalazine administration due to the potential for falsely depressed results  Specimen collection should occur prior to Sulfapyridine administration due to the potential for falsely elevated results  (1) HEMOGLOBIN A1C 25Oct2017 02:14PM [employer] Order Number: [id number]     Test Name Result Flag Reference   HEMOGLOBIN A1C 7 4 % H 4 2-6 3   EST  AVG   GLUCOSE 166 mg/dl       (1) MICROALBUMIN CREATININE RATIO, RANDOM URINE 25Oct2017 02:14PM [employer] Order Number: [id number]     Test Name Result Flag Reference   MICROALBUMIN/ CREAT R 20 mg/g creatinine  0-30 MICROALBUMIN,URINE 8 1 mg/L  0 0-20 0   CREATININE URINE 40 6 mg/dL       (1) CBC/PLT/DIFF 25Oct2017 02:14PM Cheryl Vasques     Test Name Result Flag Reference   WBC COUNT 11 14 Thousand/uL H 4 31-10 16   RBC COUNT 5 24 Million/uL  3 88-5 62   HEMOGLOBIN 15 6 g/dL  12 0-17 0   HEMATOCRIT 44 5 %  36 5-49 3   MCV 85 fL  82-98   MCH 29 8 pg  26 8-34 3   MCHC 35 1 g/dL  31 4-37 4   RDW 13 9 %  11 6-15 1   MPV 8 9 fL  8 9-12 7   PLATELET COUNT 875 Thousands/uL  149-390   nRBC AUTOMATED 0 /100 WBCs     NEUTROPHILS RELATIVE PERCENT 61 %  43-75   LYMPHOCYTES RELATIVE PERCENT 26 %  14-44   MONOCYTES RELATIVE PERCENT 9 %  4-12   EOSINOPHILS RELATIVE PERCENT 3 %  0-6   BASOPHILS RELATIVE PERCENT 1 %  0-1   NEUTROPHILS ABSOLUTE COUNT 6 80 Thousands/? ??L  1 85-7 62   LYMPHOCYTES ABSOLUTE COUNT 2 92 Thousands/? ??L  0 60-4 47   MONOCYTES ABSOLUTE COUNT 0 97 Thousand/? ??L  0 17-1 22   EOSINOPHILS ABSOLUTE COUNT 0 34 Thousand/? ??L  0 00-0 61   BASOPHILS ABSOLUTE COUNT 0 08 Thousands/? ??L  0 00-0 10   This is a patient instruction: This test is non-fasting  Please drink two glasses of water morning of bloodwork

## 2018-01-15 NOTE — MISCELLANEOUS
Assessment    1  UTI (urinary tract infection) (599 0) (N39 0)   2  Neurogenic bladder (596 54) (N31 9)    Plan  UTI (urinary tract infection)    · Potassium Chloride Sylvia ER 20 MEQ Oral Tablet Extended Release; TAKE 1  TABLET DAILY   Rx By: Suzan Carney; Dispense: 90 Days ; #:90 Tablet Extended Release; Refill: 3; For: UTI (urinary tract infection); THANIA = N; Record    Discussion/Summary  Discussion Summary:   Recommend continuing current medication  Recommend return to office in 3-4 months for recheck  Chief Complaint  Chief Complaint Free Text Note Form: Patient here for KENDELL  He was hospitalized for UTI and LE cellulitis  While in hospital his sugar dropped to 41 and his diabetic meds were adjusted to just Metformin 500mg 1 po qd  He was also started on Kdur and Lasix  History of Present Illness  TCM Communication  Luke: The patient is being contacted for follow-up after hospitalization and For urinary tract infection and hypoglycemia  Hospital records were reviewed  He was hospitalized at and at [facility]  The dates of hospitalization:, January 27, 2016 through January 31, 2016  Diagnosis: UTI  He was discharged to home  Medications were not reviewed today  He scheduled a follow up appointment  Counseling was provided to the patient  Topics counseled included importance of compliance with treatment  Communication performed and completed by Derrick Duke   HPI: Patient is generally feeling well after being home from the hospital for the last 2 days  Appetite is normal  He is currently off antibiotics  No fevers or chills  He was recently taken off of glyburide  Blood glucose readings have generally been stable with no hypoglycemic events on metformin  Review of Systems  Complete-Male:   Constitutional: No fever or chills, feels well, no tiredness, no recent weight gain or weight loss  Eyes: No complaints of eye pain, no red eyes, no discharge from eyes, no itchy eyes     ENT: no complaints of earache, no hearing loss, no nosebleeds, no nasal discharge, no sore throat, no hoarseness  Cardiovascular: No complaints of slow heart rate, no fast heart rate, no chest pain, no palpitations, no leg claudication, no lower extremity  Respiratory: No complaints of shortness of breath, no wheezing, no cough, no SOB on exertion, no orthopnea or PND  Gastrointestinal: No complaints of abdominal pain, no constipation, no nausea or vomiting, no diarrhea or bloody stools  Genitourinary: No complaints of dysuria, no incontinence, no hesitancy, no nocturia, no genital lesion, no testicular pain  Musculoskeletal: No complaints of arthralgia, no myalgias, no joint swelling or stiffness, no limb pain or swelling  Integumentary: No complaints of skin rash or skin lesions, no itching, no skin wound, no dry skin  Neurological: No compliants of headache, no confusion, no convulsions, no numbness or tingling, no dizziness or fainting, no limb weakness, no difficulty walking  Psychiatric: Is not suicidal, no sleep disturbances, no anxiety or depression, no change in personality, no emotional problems  Endocrine: No complaints of proptosis, no hot flashes, no muscle weakness, no erectile dysfunction, no deepening of the voice, no feelings of weakness  Hematologic/Lymphatic: No complaints of swollen glands, no swollen glands in the neck, does not bleed easily, no easy bruising  Active Problems    1  Aneurysm (442 9) (I72 9)   2  Benign colon polyp (211 3) (K63 5)   3  Benign prostatic hypertrophy (600 00) (N40 0)   4  Bilateral shoulder pain (719 41) (M25 511,M25 512)   5  BPH with obstruction/lower urinary tract symptoms (600 01) (N40 1)   6  Bursitis of left shoulder (726 10) (M75 52)   7  Bursitis of right shoulder (726 10) (M75 51)   8  Cellulitis of right leg without foot (682 6) (L03 115)   9  Cervical spinal stenosis (723 0) (M48 02)   10  Depression (311) (F32 9)   11   Dyslipidemia (272  4) (E78 5)   12  Encounter for long-term (current) use of medications (V58 69) (Z79 899)   13  Esophageal reflux (530 81) (K21 9)   14  Fatigue (780 79) (R53 83)   15  Fatty liver (571 8) (K76 0)   16  Generalized anxiety disorder (300 02) (F41 1)   17  Glaucoma (365 9) (H40 9)   18  Headache (784 0) (R51)   19  Hiatal hernia (553 3) (K44 9)   20  Hyperlipidemia (272 4) (E78 5)   21  Hypertension (401 9) (I10)   22  Influenza (487 1) (J11 1)   23  Multiple sclerosis (340) (G35)   24  Neuralgia (729 2) (M79 2)   25  Neurogenic bladder (596 54) (N31 9)   26  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)   27  Obstructive sleep apnea (327 23) (G47 33)   28  Other spondylosis with myelopathy, cervical region (721 1) (M47 12)   29  Preoperative cardiovascular examination (V72 81) (Z01 810)   30  Reactive airway disease (493 90) (J45 909)   31  Shortness of breath (786 05) (R06 02)   32  Thyroid nodule (241 0) (E04 1)   33  Type 2 diabetes mellitus (250 00) (E11 9)   34  Urinary retention (788 20) (R33 9)   35  UTI (urinary tract infection) (599 0) (N39 0)   36  Visit for pre-operative examination (V72 84) (Z01 818)   37  Vitamin D deficiency (268 9) (E55 9)    Past Medical History    1  History of Acute laryngitis (464 00) (J04 0)   2  History of Acute nonsuppurative otitis media, unspecified laterality (381 00) (H65 199)   3  History of Arm weakness (729 89) (M62 81)   4  History of Arthritis (V13 4)   5  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)   6  History of Bronchitis (490) (J40)   7  History of Cough (786 2) (R05)   8  History of Diabetes Mellitus (250 00)   9  History of Dysfunction of eustachian tube, unspecified laterality (381 81) (H69 80)   10  History of Erectile dysfunction of non-organic origin (302 72) (F52 21)   11  History of acute bronchitis (V12 69) (Z87 09)   12  History of bladder infections (V13 02) (Z87 440)   13  History of constipation (V12 79) (Z87 19)   14   History of dizziness (V13 89) (Z87 898)   15  History of fatigue (V13 89) (Z87 898)   16  History of fatigue (V13 89) (Z87 898)   17  History of fatigue (V13 89) (Z87 898)   18  History of Imbalance (781 2) (R26 89)   19  History of Leg muscle spasm (728 85) (M62 838)   20  History of Memory problem (780 93) (R41 3)   21  History of Nephrolithiasis (V13 01)   22  History of No natural teeth (520 0) (K00 0)   23  History of Sinus pain (478 19) (J34 89)   24  History of Strain of thoracic region (847 1) (S29 012A)   25  History of Wears eyeglasses (V49 89) (Z97 3)    Surgical History    1  History of Diagnostic Cystoscopy   2  History of Myringotomy - With Ventilating Tube Insertion   3  History of Transscleral Cyclophotocoagulation Noncontact YAG Laser    Family History    1  Family history of Coronary Artery Disease (V17 49)   2  Family history of    3  Family history of Heart problem   4  Family history of Macular Degeneration   5  Family history of Type 2 Diabetes Mellitus    6  Family history of    7  Family history of Heart problem    8  Family history of Brain aneurysm   9  Family history of Malignant Pancreatic Neoplasm    10  Family history of Cancer   11  Family history of     15  Family history of     15  Family history of     15  Family history of     Social History    · Assistive Devices: Wheelchair   · Drinks coffee   · Education Level: Less than high school   · Former smoker (V15 82) (V97 285)   · Functioning activity level   · Lives with relatives   · Marital History - Single   · Never Drank Alcohol   · No alcohol use   · No caffeine use   · No drug use   · Not currently employed   · Single  Social History Reviewed: The social history was reviewed and updated today  The social history was reviewed and is unchanged  Current Meds   1  ALPRAZolam 0 25 MG Oral Tablet; TAKE 1 TABLET 3 times daily; Last Rx:34Nkt4663   Ordered   2   AmLODIPine Besylate 10 MG Oral Tablet; TAKE 1 TABLET DAILY; Therapy: 84JNM6276 to (Evaluate:40Cbe1470)  Requested for: 90Eex3464; Last   Rx:57Phv7346 Ordered   3  Aspirin  MG Oral Tablet Delayed Release; TAKE 1 TABLET BY MOUTH EACH DAY; Therapy: (Recorded:49Uqa3461) to Recorded   4  Aspirin  MG Oral Tablet Delayed Release; Take 1 tablet daily; Therapy: 56JGG1749 to (Derotha Sauce)  Requested for: 75Lio4994; Last   Rx:70Vhk5049; Status: ACTIVE - Retrospective By Protocol Authorization Ordered   5  Atorvastatin Calcium 20 MG Oral Tablet; TAKE 1 TABLET DAILY FOR CHOLESTEROL; Therapy: 49Aew6291 to (Evaluate:23Oct2016)  Requested for: 29Oct2015; Last   Rx:29Oct2015 Ordered   6  Baclofen 10 MG Oral Tablet; TAKE 0 5 TABLET Daily at 2pm and may take additional 0 5   tablet daily PRN  Requested for: 95PCK4521; Last Rx:01Oct2015 Ordered   7  Gabapentin 300 MG Oral Capsule; 1 bid, 2 hs; Therapy: 69BSM9822 to (Evaluate:55Zvh4027)  Requested for: 12Jan2016; Last   Rx:12Jan2016 Ordered   8  Lasix 20 MG Oral Tablet; TAKE 1 TABLET DAILY AS DIRECTED; Therapy: (Recorded:65Wuk4290) to Recorded   9  Levofloxacin 500 MG Oral Tablet; TAKE 1 TABLET DAILY AS DIRECTED; Therapy: 26BQY7329 to (Evaluate:20Jan2016)  Requested for: 91YZQ4599; Last   Rx:15Jan2016 Ordered   10  Losartan Potassium 50 MG Oral Tablet; TAKE 1 TABLET DAILY; Therapy: 25UCT8831 to (Evaluate:97Nmu6046)  Requested for: 95Gam4900; Last    Rx:46Xmv5095 Ordered   11  MetFORMIN HCl - 500 MG Oral Tablet; TAKE 1 TABLET DAILY WITH FOOD; Therapy: (Recorded:93Vcy9392) to Recorded   12  OneTouch Ultra 2 w/Device Kit; Therapy: 02VUC5435 to (Last Rx:15Mar2011)  Requested for: 84WFD1416 Ordered   13  OneTouch Ultra Blue In [employer]; test 3 times daily; Therapy: 93YVR9870 to (Evaluate:20Apr2016)  Requested for: 21Jan2016; Last    Rx:21Jan2016 Ordered   14  OneTouch UltraSoft Lancets Miscellaneous; TEST TWICE A DAY;     Therapy: 91UGC6012 to (Last Rx:17Jan2016)  Requested for: 15YAJ6661 Ordered   15  Pantoprazole Sodium 40 MG Oral Tablet Delayed Release; TAKE 1 TABLET TWICE DAILY    30 MINUTES BEFORE BREAKFAST AND DINNER  Requested for: 76ZWS7332; Last    Rx:37Cxs3642 Ordered   16  Potassium Chloride ER 10 MEQ Oral Tablet Extended Release; Take 1 tablet daily; Therapy: (Recorded:46Qgq1951) to Recorded   17  Propranolol HCl ER 60 MG Oral Capsule Extended Release 24 Hour; take 1 capsule    daily; Therapy: 89XFV0216 to (Juan Bucio)  Requested for: 92LTK8865; Last    Rx:31Nzq8103 Ordered  Medication List Reviewed: The medication list was reviewed and updated today  Allergies    1  No Known Drug Allergies  Denied    2  Anesthesia Extension Tubing Miscellaneous    Vitals  Signs [Data Includes: Current Encounter]   Recorded: [id number] 02:35PM   Temperature: 03 6 F  Systolic: 095  Diastolic: 68  Height Unobtainable: Yes  Weight Unobtainable: Yes    Physical Exam    Constitutional   General appearance: No acute distress, well appearing and well nourished  Eyes   Conjunctiva and lids: No swelling, erythema, or discharge  Pupils and irises: Equal, round and reactive to light  Ears, Nose, Mouth, and Throat   External inspection of ears and nose: Normal     Otoscopic examination: Tympanic membrance translucent with normal light reflex  Canals patent without erythema  Nasal mucosa, septum, and turbinates: Normal without edema or erythema  Oropharynx: Normal with no erythema, edema, exudate or lesions  Pulmonary   Respiratory effort: No increased work of breathing or signs of respiratory distress  Auscultation of lungs: Clear to auscultation, equal breath sounds bilaterally, no wheezes, no rales, no rhonci  Cardiovascular   Palpation of heart: Normal PMI, no thrills  Auscultation of heart: Normal rate and rhythm, normal S1 and S2, without murmurs      Examination of extremities for edema and/or varicosities: Normal     Carotid pulses: Normal     Abdomen   Abdomen: Non-tender, no masses  Liver and spleen: No hepatomegaly or splenomegaly  Lymphatic   Palpation of lymph nodes in neck: No lymphadenopathy  Musculoskeletal   Gait and station: Normal     Digits and nails: Normal without clubbing or cyanosis  Inspection/palpation of joints, bones, and muscles: Normal     Skin   Skin and subcutaneous tissue: Normal without rashes or lesions  Neurologic   Cranial nerves: Cranial nerves 2-12 intact  Reflexes: 2+ and symmetric  Sensation: No sensory loss  Psychiatric   Orientation to person, place and time: Normal     Mood and affect: Normal          Health Management  Type 2 diabetes mellitus   *VB - Eye Exam; every 2 years; Last 71KUL3393; Next Due: 54HZM4766; Active    Future Appointments    Date/Time Provider Specialty Site   09/06/2016 01:00 PM IVORY Will   Neurology [facility]   05/16/2016 02:00 PM Gerber Arriaga MD Urology [address]   Electronically signed by : Bill Zamora DO; Feb 4 2016  3:21PM EST                       (Author)

## 2018-01-15 NOTE — RESULT NOTES
Message   Urine culture with no significant findings     Verified Results  (1) URINE CULTURE [id number] 02:19PM Jasmyn Astoria     Test Name Result Flag Reference   CLINICAL REPORT (Report)     Test:        Urine culture  Specimen Type:   Urine  Specimen Date:   1/13/2016 11:50 AM  Result Date:    1/14/2016 2:19 PM  Result Status:   Final result  Resulting Lab:   [facility] [address] [address]            Tel: [phone]                 CULTURE                                       ------------------                                   >100,000 cfu/ml Mixed Contaminants X3

## 2018-01-15 NOTE — RESULT NOTES
Message   Urine testing shows no infection  Verified Results  (1) URINE CULTURE 02Sep2016 12:00AM Darrell Precise     Test Name Result Flag Reference   CULTURE, URINE, ROUTINE      CULTURE, URINE, ROUTINE         MICRO NUMBER:      [id number]    TEST STATUS:       FINAL    SPECIMEN SOURCE:   URINE    SPECIMEN QUALITY:  ADEQUATE    RESULT:            Three or more organisms present, each greater                       than 10,000 cu/mL  May represent normal keanu                       contamination from external genitalia  No further                       testing is required

## 2018-01-16 NOTE — RESULT NOTES
Verified Results  (1) CBC/PLT/DIFF [id number] 12:56PM Allean Presume     Test Name Result Flag Reference   WBC COUNT 11 79 Thousand/uL H 4 31-10 16   RBC COUNT 5 18 Million/uL  3 88-5 62   HEMOGLOBIN 15 4 g/dL  12 0-17 0   HEMATOCRIT 45 3 %  36 5-49 3   MCV 88 fL  82-98   MCH 29 7 pg  26 8-34 3   MCHC 34 0 g/dL  31 4-37 4   RDW 13 8 %  11 6-15 1   MPV 9 1 fL  8 9-12 7   PLATELET COUNT 144 Thousands/uL  149-390   nRBC AUTOMATED 0 /100 WBCs     NEUTROPHILS RELATIVE PERCENT 60 %  43-75   LYMPHOCYTES RELATIVE PERCENT 28 %  14-44   MONOCYTES RELATIVE PERCENT 7 %  4-12   EOSINOPHILS RELATIVE PERCENT 4 %  0-6   BASOPHILS RELATIVE PERCENT 1 %  0-1   NEUTROPHILS ABSOLUTE COUNT 7 16 Thousands/? ??L  1 85-7 62   LYMPHOCYTES ABSOLUTE COUNT 3 32 Thousands/? ??L  0 60-4 47   MONOCYTES ABSOLUTE COUNT 0 77 Thousand/? ??L  0 17-1 22   EOSINOPHILS ABSOLUTE COUNT 0 45 Thousand/? ??L  0 00-0 61   BASOPHILS ABSOLUTE COUNT 0 09 Thousands/? ??L  0 00-0 10   This is a patient instruction: This test is non-fasting  Please drink two glasses of water morning of bloodwork

## 2018-01-17 NOTE — RESULT NOTES
Message   Diabetes is under poor control  Recommend adding Januvia 100 mg daily  Recheck blood testing again in 3-4 months  Verified Results  (1) URINE CULTURE [id number] 07:00AM Artie Parrish     Test Name Result Flag Reference   CULTURE, URINE, ROUTINE      CULTURE, URINE, ROUTINE         MICRO NUMBER:      [id number]    TEST STATUS:       FINAL    SPECIMEN SOURCE:   URINE    SPECIMEN QUALITY:  ADEQUATE    RESULT:            Three or more organisms present, each greater                       than 10,000 cu/mL  May represent normal keanu                       contamination from external genitalia  No further                       testing is required  NO COLLECTION DATE RECEIVED  WE HAVE USED  THE DATE THE SPECIMEN WAS RECEIVED BY THIS  LABORATORY AS THE COLLECTION DATE  IF THIS  IS INCORRECT, PLEASE CONTACT CLIENT SERVICES    PHONE NUMBER: [phone]     (1) CBC/PLT/DIFF [id number] 12:00AM Jessenia Cuevas     Test Name Result Flag Reference   WHITE BLOOD CELL COUNT 12 6 Thousand/uL H 3 8-10 8   RED BLOOD CELL COUNT 5 55 Million/uL  4 20-5 80   HEMOGLOBIN 15 6 g/dL  13 2-17 1   HEMATOCRIT 48 4 %  38 5-50 0   MCV 87 2 fL  80 0-100 0   MCH 28 0 pg  27 0-33 0   MCHC 32 1 g/dL  32 0-36 0   RDW 14 1 %  11 0-15 0   PLATELET COUNT 125 Thousand/uL  140-400   MPV 7 5 fL  7 5-11 5   ABSOLUTE NEUTROPHILS 8404 cells/uL H 4108-2995   ABSOLUTE LYMPHOCYTES 2885 cells/uL  850-3900   ABSOLUTE MONOCYTES 592 cells/uL  200-950   ABSOLUTE EOSINOPHILS 655 cells/uL H    ABSOLUTE BASOPHILS 63 cells/uL  0-200   NEUTROPHILS 66 7 %     LYMPHOCYTES 22 9 %     MONOCYTES 4 7 %     EOSINOPHILS 5 2 %     BASOPHILS 0 5 %     WHITE BLOOD CELL COUNT 12 6 Thousand/uL H 3 8-10 8   RED BLOOD CELL COUNT 5 55 Million/uL  4 20-5 80   HEMOGLOBIN 15 6 g/dL  13 2-17 1   HEMATOCRIT 48 4 %  38 5-50 0   MCV 87 2 fL  80 0-100 0   MCH 28 0 pg  27 0-33 0   MCHC 32 1 g/dL  32 0-36 0   RDW 14 1 %  11 0-15 0   PLATELET COUNT 104 Thousand/uL  140-400 MPV 7 5 fL  7 5-11 5   ABSOLUTE NEUTROPHILS 8404 cells/uL H 6221-3153   ABSOLUTE LYMPHOCYTES 2885 cells/uL  850-3900   ABSOLUTE MONOCYTES 592 cells/uL  200-950   ABSOLUTE EOSINOPHILS 655 cells/uL H    ABSOLUTE BASOPHILS 63 cells/uL  0-200   NEUTROPHILS 66 7 %     LYMPHOCYTES 22 9 %     MONOCYTES 4 7 %     EOSINOPHILS 5 2 %     BASOPHILS 0 5 %             (1) COMPREHENSIVE METABOLIC PANEL [id number] 20:63JM Juanita Pillion     Test Name Result Flag Reference   GLUCOSE 172 mg/dL H 65-99   Fasting reference interval   UREA NITROGEN (BUN) 14 mg/dL  7-25   CREATININE 0 85 mg/dL  0 70-1 25   For patients >52years of age, the reference limit  for Creatinine is approximately 13% higher for people  identified as -American  eGFR NON-AFR  AMERICAN 91 mL/min/1 73m2  > OR = 60   eGFR AFRICAN AMERICAN 105 mL/min/1 73m2  > OR = 60   BUN/CREATININE RATIO   1-32   NOT APPLICABLE (calc)   SODIUM 135 mmol/L  135-146   POTASSIUM 3 9 mmol/L  3 5-5 3   CHLORIDE 97 mmol/L L    CARBON DIOXIDE 22 mmol/L  19-30   CALCIUM 9 6 mg/dL  8 6-10 3   PROTEIN, TOTAL 7 6 g/dL  6 1-8 1   ALBUMIN 4 3 g/dL  3 6-5 1   GLOBULIN 3 3 g/dL (calc)  1 9-3 7   ALBUMIN/GLOBULIN RATIO 1 3 (calc)  1 0-2 5   BILIRUBIN, TOTAL 0 4 mg/dL  0 2-1 2   ALKALINE PHOSPHATASE 96 U/L     AST 11 U/L  10-35   ALT 15 U/L  9-46     (1) LIPID PANEL, FASTING [id number] 12:00AM RodowiczGwendel Rinne     Test Name Result Flag Reference   CHOLESTEROL, TOTAL 164 mg/dL  125-200   HDL CHOLESTEROL 46 mg/dL  > OR = 40   TRIGLICERIDES 602 mg/dL  <150   LDL-CHOLESTEROL 89 mg/dL (calc)  <130   Desirable range <100 mg/dL for patients with CHD or  diabetes and <70 mg/dL for diabetic patients with  known heart disease  CHOL/HDLC RATIO 3 6 (calc)  < OR = 5 0   NON HDL CHOLESTEROL 118 mg/dL (calc)     Target for non-HDL cholesterol is 30 mg/dL higher than   LDL cholesterol target       (Q) MICROALBUMIN, RANDOM URINE (W/CREATININE) [id number] 12:00AM Juanita Pillion     Test Name Result Flag Reference   CREATININE, RANDOM URINE 8 mg/dL L    MICROALBUMIN 0 3 mg/dL     Reference Range  Not established   MICROALBUMIN/CREATININE$RATIO, RANDOM URINE 38 mcg/mg creat H <30   The ADA defines abnormalities in albumin  excretion as follows:     Category         Result (mcg/mg creatinine)     Normal                    <30  Microalbuminuria            Clinical albuminuria   > OR = 300     The ADA recommends that at least two of three  specimens collected within a 3-6 month period be  abnormal before considering a patient to be  within a diagnostic category  (Q) HEMOGLOBIN A1c 22Jun2016 12:00AM Brainjean Marieantony     Test Name Result Flag Reference   HEMOGLOBIN A1c 7 8 % of total Hgb H <5 7   According to ADA guidelines, hemoglobin A1c <7 0%  represents optimal control in non-pregnant diabetic  patients  Different metrics may apply to specific  patient populations  Standards of Medical Care in  [phone]  Diabetes Care  2013;36:s11-s66     For the purpose of screening for the presence of  diabetes  <5 7%       Consistent with the absence of diabetes  5 7-6 4%    Consistent with increased risk for diabetes              (prediabetes)  >or=6 5%    Consistent with diabetes     This assay result is consistent with diabetes  mellitus  Currently, no consensus exists for use of hemoglobin  A1c for diagnosis of diabetes for children         Plan  Type 2 diabetes mellitus    · Januvia 100 MG Oral Tablet; TAKE 1 TABLET DAILY FOR DIABETES

## 2018-01-22 VITALS
HEIGHT: 66 IN | SYSTOLIC BLOOD PRESSURE: 147 MMHG | OXYGEN SATURATION: 97 % | HEART RATE: 95 BPM | DIASTOLIC BLOOD PRESSURE: 65 MMHG

## 2018-03-11 DIAGNOSIS — K21.9 GASTROESOPHAGEAL REFLUX DISEASE WITHOUT ESOPHAGITIS: Primary | ICD-10-CM

## 2018-03-13 RX ORDER — PANTOPRAZOLE SODIUM 40 MG/1
TABLET, DELAYED RELEASE ORAL
Qty: 180 TABLET | Refills: 1 | Status: SHIPPED | OUTPATIENT
Start: 2018-03-13

## 2018-04-01 DIAGNOSIS — I10 ESSENTIAL HYPERTENSION: Primary | ICD-10-CM

## 2018-04-02 RX ORDER — PROPRANOLOL HCL 60 MG
CAPSULE, EXTENDED RELEASE 24HR ORAL
Qty: 90 CAPSULE | Refills: 3 | Status: SHIPPED | OUTPATIENT
Start: 2018-04-02 | End: 2021-06-02 | Stop reason: DRUGHIGH

## 2018-04-23 ENCOUNTER — TELEPHONE (OUTPATIENT)
Dept: NEUROLOGY | Facility: CLINIC | Age: 68
End: 2018-04-23

## 2018-04-24 ENCOUNTER — TELEPHONE (OUTPATIENT)
Dept: FAMILY MEDICINE CLINIC | Facility: CLINIC | Age: 68
End: 2018-04-24

## 2018-04-24 PROBLEM — I72.5 ANEURYSM OF BASILAR ARTERY (HCC): Status: ACTIVE | Noted: 2017-11-29

## 2018-04-24 NOTE — TELEPHONE ENCOUNTER
Rec'd a call from [place] an  with   Care trying to obtain records for a patient  Returned her call and LMOM that we need the request in writing faxed to us   (call back # [phone])

## 2018-05-14 ENCOUNTER — TELEPHONE (OUTPATIENT)
Dept: UROLOGY | Facility: CLINIC | Age: 68
End: 2018-05-14

## 2018-05-14 NOTE — TELEPHONE ENCOUNTER
Received call from Sanjeev Juárez from [facility] wanting to know the size of patient's sp tube since it was due to be changed    I LM for her stating that he is due for his yearly on 5/18 and it would be changed then

## 2018-05-18 ENCOUNTER — TELEPHONE (OUTPATIENT)
Dept: UROLOGY | Facility: CLINIC | Age: 68
End: 2018-05-18

## 2018-06-11 ENCOUNTER — PROCEDURE VISIT (OUTPATIENT)
Dept: UROLOGY | Facility: CLINIC | Age: 68
End: 2018-06-11
Payer: MEDICARE

## 2018-06-11 VITALS — HEART RATE: 96 BPM | DIASTOLIC BLOOD PRESSURE: 74 MMHG | SYSTOLIC BLOOD PRESSURE: 146 MMHG

## 2018-06-11 DIAGNOSIS — N21.0 BLADDER STONE: Primary | ICD-10-CM

## 2018-06-11 PROCEDURE — 51710 CHANGE OF BLADDER TUBE: CPT | Performed by: UROLOGY

## 2018-06-11 PROCEDURE — 52000 CYSTOURETHROSCOPY: CPT | Performed by: UROLOGY

## 2018-07-10 ENCOUNTER — TELEPHONE (OUTPATIENT)
Dept: NEUROLOGY | Facility: CLINIC | Age: 68
End: 2018-07-10

## 2018-10-11 ENCOUNTER — TELEPHONE (OUTPATIENT)
Dept: NEUROSURGERY | Facility: CLINIC | Age: 68
End: 2018-10-11

## 2018-10-11 NOTE — TELEPHONE ENCOUNTER
Called patient in regards to appointment on 11/28/18  Tried to reschedule appointment but patient seemed confused and told me to call him back around 2pm because he doesn't have anything to write on for the information      Will be calling back around 2pm

## 2018-10-21 ENCOUNTER — APPOINTMENT (EMERGENCY)
Dept: CT IMAGING | Facility: HOSPITAL | Age: 68
DRG: 065 | End: 2018-10-21
Payer: MEDICARE

## 2018-10-21 ENCOUNTER — HOSPITAL ENCOUNTER (INPATIENT)
Facility: HOSPITAL | Age: 68
LOS: 5 days | Discharge: NURSING, REHAB OR LONG-TERM CARE | DRG: 065 | End: 2018-10-26
Attending: EMERGENCY MEDICINE | Admitting: INTERNAL MEDICINE
Payer: MEDICARE

## 2018-10-21 ENCOUNTER — APPOINTMENT (EMERGENCY)
Dept: RADIOLOGY | Facility: HOSPITAL | Age: 68
DRG: 065 | End: 2018-10-21
Payer: MEDICARE

## 2018-10-21 DIAGNOSIS — L03.90 CELLULITIS: ICD-10-CM

## 2018-10-21 DIAGNOSIS — G35 MULTIPLE SCLEROSIS (HCC): Primary | ICD-10-CM

## 2018-10-21 DIAGNOSIS — N39.0 UTI (URINARY TRACT INFECTION): ICD-10-CM

## 2018-10-21 DIAGNOSIS — G45.9 TIA (TRANSIENT ISCHEMIC ATTACK): ICD-10-CM

## 2018-10-21 DIAGNOSIS — Z93.59 CHRONIC SUPRAPUBIC CATHETER (HCC): ICD-10-CM

## 2018-10-21 DIAGNOSIS — N31.9 NEUROGENIC BLADDER: ICD-10-CM

## 2018-10-21 PROBLEM — I63.9 CVA (CEREBRAL VASCULAR ACCIDENT) (HCC): Status: ACTIVE | Noted: 2018-10-21

## 2018-10-21 LAB
ANION GAP BLD CALC-SCNC: 17 MMOL/L (ref 4–13)
ANION GAP SERPL CALCULATED.3IONS-SCNC: 9 MMOL/L (ref 4–13)
APTT PPP: 28 SECONDS (ref 24–36)
BACTERIA UR QL AUTO: ABNORMAL /HPF
BILIRUB UR QL STRIP: NEGATIVE
BUN BLD-MCNC: 10 MG/DL (ref 5–25)
BUN SERPL-MCNC: 10 MG/DL (ref 5–25)
CA-I BLD-SCNC: 1.17 MMOL/L (ref 1.12–1.32)
CALCIUM SERPL-MCNC: 9.9 MG/DL (ref 8.3–10.1)
CHLORIDE BLD-SCNC: 96 MMOL/L (ref 100–108)
CHLORIDE SERPL-SCNC: 95 MMOL/L (ref 100–108)
CLARITY UR: CLEAR
CO2 SERPL-SCNC: 27 MMOL/L (ref 21–32)
COLOR UR: ABNORMAL
CREAT BLD-MCNC: 0.8 MG/DL (ref 0.6–1.3)
CREAT SERPL-MCNC: 1.04 MG/DL (ref 0.6–1.3)
ERYTHROCYTE [DISTWIDTH] IN BLOOD BY AUTOMATED COUNT: 13.2 % (ref 11.6–15.1)
GFR SERPL CREATININE-BSD FRML MDRD: 73 ML/MIN/1.73SQ M
GFR SERPL CREATININE-BSD FRML MDRD: 91 ML/MIN/1.73SQ M
GLUCOSE SERPL-MCNC: 190 MG/DL (ref 65–140)
GLUCOSE SERPL-MCNC: 199 MG/DL (ref 65–140)
GLUCOSE UR STRIP-MCNC: ABNORMAL MG/DL
HCT VFR BLD AUTO: 47.8 % (ref 36.5–49.3)
HCT VFR BLD CALC: 50 % (ref 36.5–49.3)
HGB BLD-MCNC: 16.1 G/DL (ref 12–17)
HGB BLDA-MCNC: 17 G/DL (ref 12–17)
HGB UR QL STRIP.AUTO: ABNORMAL
INR PPP: 0.9 (ref 0.86–1.17)
KETONES UR STRIP-MCNC: NEGATIVE MG/DL
LEUKOCYTE ESTERASE UR QL STRIP: ABNORMAL
MCH RBC QN AUTO: 29.3 PG (ref 26.8–34.3)
MCHC RBC AUTO-ENTMCNC: 33.7 G/DL (ref 31.4–37.4)
MCV RBC AUTO: 87 FL (ref 82–98)
NITRITE UR QL STRIP: POSITIVE
NON-SQ EPI CELLS URNS QL MICRO: ABNORMAL /HPF
PCO2 BLD: 27 MMOL/L (ref 21–32)
PH UR STRIP.AUTO: 6 [PH] (ref 4.5–8)
PLATELET # BLD AUTO: 374 THOUSANDS/UL (ref 149–390)
PMV BLD AUTO: 8.1 FL (ref 8.9–12.7)
POTASSIUM BLD-SCNC: 4.2 MMOL/L (ref 3.5–5.3)
POTASSIUM SERPL-SCNC: 5.1 MMOL/L (ref 3.5–5.3)
PROT UR STRIP-MCNC: NEGATIVE MG/DL
PROTHROMBIN TIME: 12.3 SECONDS (ref 11.8–14.2)
RBC # BLD AUTO: 5.5 MILLION/UL (ref 3.88–5.62)
RBC #/AREA URNS AUTO: ABNORMAL /HPF
SODIUM BLD-SCNC: 135 MMOL/L (ref 136–145)
SODIUM SERPL-SCNC: 131 MMOL/L (ref 136–145)
SP GR UR STRIP.AUTO: 1.01 (ref 1–1.03)
SPECIMEN SOURCE: ABNORMAL
UROBILINOGEN UR QL STRIP.AUTO: 0.2 E.U./DL
WBC # BLD AUTO: 12.99 THOUSAND/UL (ref 4.31–10.16)
WBC #/AREA URNS AUTO: ABNORMAL /HPF
WBC CLUMPS # UR AUTO: PRESENT /UL

## 2018-10-21 PROCEDURE — 85730 THROMBOPLASTIN TIME PARTIAL: CPT | Performed by: EMERGENCY MEDICINE

## 2018-10-21 PROCEDURE — 87077 CULTURE AEROBIC IDENTIFY: CPT | Performed by: EMERGENCY MEDICINE

## 2018-10-21 PROCEDURE — 70496 CT ANGIOGRAPHY HEAD: CPT

## 2018-10-21 PROCEDURE — 71045 X-RAY EXAM CHEST 1 VIEW: CPT

## 2018-10-21 PROCEDURE — 81001 URINALYSIS AUTO W/SCOPE: CPT | Performed by: EMERGENCY MEDICINE

## 2018-10-21 PROCEDURE — 36415 COLL VENOUS BLD VENIPUNCTURE: CPT | Performed by: EMERGENCY MEDICINE

## 2018-10-21 PROCEDURE — 87186 SC STD MICRODIL/AGAR DIL: CPT | Performed by: EMERGENCY MEDICINE

## 2018-10-21 PROCEDURE — 80048 BASIC METABOLIC PNL TOTAL CA: CPT | Performed by: EMERGENCY MEDICINE

## 2018-10-21 PROCEDURE — 93005 ELECTROCARDIOGRAM TRACING: CPT

## 2018-10-21 PROCEDURE — 85610 PROTHROMBIN TIME: CPT | Performed by: EMERGENCY MEDICINE

## 2018-10-21 PROCEDURE — 87086 URINE CULTURE/COLONY COUNT: CPT | Performed by: EMERGENCY MEDICINE

## 2018-10-21 PROCEDURE — 85027 COMPLETE CBC AUTOMATED: CPT | Performed by: EMERGENCY MEDICINE

## 2018-10-21 PROCEDURE — 85014 HEMATOCRIT: CPT

## 2018-10-21 PROCEDURE — 80047 BASIC METABLC PNL IONIZED CA: CPT

## 2018-10-21 PROCEDURE — 70498 CT ANGIOGRAPHY NECK: CPT

## 2018-10-21 PROCEDURE — 99291 CRITICAL CARE FIRST HOUR: CPT

## 2018-10-21 PROCEDURE — 96374 THER/PROPH/DIAG INJ IV PUSH: CPT

## 2018-10-21 PROCEDURE — 70450 CT HEAD/BRAIN W/O DYE: CPT

## 2018-10-21 RX ORDER — POTASSIUM CHLORIDE 750 MG/1
10 TABLET, EXTENDED RELEASE ORAL DAILY
Status: DISCONTINUED | OUTPATIENT
Start: 2018-10-22 | End: 2018-10-26 | Stop reason: HOSPADM

## 2018-10-21 RX ORDER — HYDRALAZINE HYDROCHLORIDE 20 MG/ML
10 INJECTION INTRAMUSCULAR; INTRAVENOUS EVERY 6 HOURS PRN
Status: DISCONTINUED | OUTPATIENT
Start: 2018-10-21 | End: 2018-10-26 | Stop reason: HOSPADM

## 2018-10-21 RX ORDER — SUCRALFATE 1 G/1
1 TABLET ORAL 2 TIMES DAILY
Status: DISCONTINUED | OUTPATIENT
Start: 2018-10-22 | End: 2018-10-26 | Stop reason: HOSPADM

## 2018-10-21 RX ORDER — PROPRANOLOL HCL 60 MG
60 CAPSULE, EXTENDED RELEASE 24HR ORAL DAILY
Status: DISCONTINUED | OUTPATIENT
Start: 2018-10-22 | End: 2018-10-26 | Stop reason: HOSPADM

## 2018-10-21 RX ORDER — DOCUSATE SODIUM 100 MG/1
100 CAPSULE, LIQUID FILLED ORAL 2 TIMES DAILY
Status: DISCONTINUED | OUTPATIENT
Start: 2018-10-22 | End: 2018-10-26 | Stop reason: HOSPADM

## 2018-10-21 RX ORDER — ATORVASTATIN CALCIUM 10 MG/1
20 TABLET, FILM COATED ORAL
Status: DISCONTINUED | OUTPATIENT
Start: 2018-10-21 | End: 2018-10-22

## 2018-10-21 RX ORDER — ACETAMINOPHEN 325 MG/1
650 TABLET ORAL EVERY 6 HOURS PRN
Status: DISCONTINUED | OUTPATIENT
Start: 2018-10-21 | End: 2018-10-25

## 2018-10-21 RX ORDER — ALPRAZOLAM 0.25 MG/1
0.25 TABLET ORAL 2 TIMES DAILY PRN
COMMUNITY

## 2018-10-21 RX ORDER — HEPARIN SODIUM 5000 [USP'U]/ML
5000 INJECTION, SOLUTION INTRAVENOUS; SUBCUTANEOUS EVERY 8 HOURS SCHEDULED
Status: DISCONTINUED | OUTPATIENT
Start: 2018-10-21 | End: 2018-10-26 | Stop reason: HOSPADM

## 2018-10-21 RX ORDER — AMLODIPINE BESYLATE 10 MG/1
10 TABLET ORAL DAILY
Status: DISCONTINUED | OUTPATIENT
Start: 2018-10-22 | End: 2018-10-26 | Stop reason: HOSPADM

## 2018-10-21 RX ORDER — GABAPENTIN 300 MG/1
300 CAPSULE ORAL 3 TIMES DAILY
Status: DISCONTINUED | OUTPATIENT
Start: 2018-10-21 | End: 2018-10-26 | Stop reason: HOSPADM

## 2018-10-21 RX ORDER — LOSARTAN POTASSIUM 50 MG/1
50 TABLET ORAL DAILY
Status: DISCONTINUED | OUTPATIENT
Start: 2018-10-22 | End: 2018-10-26 | Stop reason: HOSPADM

## 2018-10-21 RX ORDER — PANTOPRAZOLE SODIUM 40 MG/1
40 TABLET, DELAYED RELEASE ORAL
Status: DISCONTINUED | OUTPATIENT
Start: 2018-10-22 | End: 2018-10-26 | Stop reason: HOSPADM

## 2018-10-21 RX ORDER — FUROSEMIDE 20 MG/1
20 TABLET ORAL 2 TIMES DAILY
Status: DISCONTINUED | OUTPATIENT
Start: 2018-10-22 | End: 2018-10-26 | Stop reason: HOSPADM

## 2018-10-21 RX ORDER — BACLOFEN 10 MG/1
5 TABLET ORAL 2 TIMES DAILY
Status: DISCONTINUED | OUTPATIENT
Start: 2018-10-22 | End: 2018-10-26 | Stop reason: HOSPADM

## 2018-10-21 RX ORDER — ASPIRIN 325 MG
325 TABLET ORAL DAILY
Status: DISCONTINUED | OUTPATIENT
Start: 2018-10-22 | End: 2018-10-22

## 2018-10-21 RX ADMIN — CEFAZOLIN SODIUM 1000 MG: 1 SOLUTION INTRAVENOUS at 22:09

## 2018-10-21 RX ADMIN — IOHEXOL 90 ML: 350 INJECTION, SOLUTION INTRAVENOUS at 21:52

## 2018-10-22 ENCOUNTER — APPOINTMENT (INPATIENT)
Dept: MRI IMAGING | Facility: HOSPITAL | Age: 68
DRG: 065 | End: 2018-10-22
Payer: MEDICARE

## 2018-10-22 LAB
ALBUMIN SERPL BCP-MCNC: 3.1 G/DL (ref 3.5–5)
ALP SERPL-CCNC: 85 U/L (ref 46–116)
ALT SERPL W P-5'-P-CCNC: 43 U/L (ref 12–78)
ANION GAP SERPL CALCULATED.3IONS-SCNC: 10 MMOL/L (ref 4–13)
AST SERPL W P-5'-P-CCNC: 18 U/L (ref 5–45)
ATRIAL RATE: 101 BPM
BILIRUB SERPL-MCNC: 0.25 MG/DL (ref 0.2–1)
BUN SERPL-MCNC: 7 MG/DL (ref 5–25)
CALCIUM SERPL-MCNC: 9.3 MG/DL (ref 8.3–10.1)
CHLORIDE SERPL-SCNC: 99 MMOL/L (ref 100–108)
CHOLEST SERPL-MCNC: 200 MG/DL (ref 50–200)
CO2 SERPL-SCNC: 24 MMOL/L (ref 21–32)
CREAT SERPL-MCNC: 0.85 MG/DL (ref 0.6–1.3)
ERYTHROCYTE [DISTWIDTH] IN BLOOD BY AUTOMATED COUNT: 13.3 % (ref 11.6–15.1)
GFR SERPL CREATININE-BSD FRML MDRD: 89 ML/MIN/1.73SQ M
GLUCOSE SERPL-MCNC: 205 MG/DL (ref 65–140)
HCT VFR BLD AUTO: 42.6 % (ref 36.5–49.3)
HDLC SERPL-MCNC: 39 MG/DL (ref 40–60)
HGB BLD-MCNC: 14.4 G/DL (ref 12–17)
LDLC SERPL CALC-MCNC: 131 MG/DL (ref 0–100)
MCH RBC QN AUTO: 29.1 PG (ref 26.8–34.3)
MCHC RBC AUTO-ENTMCNC: 33.8 G/DL (ref 31.4–37.4)
MCV RBC AUTO: 86 FL (ref 82–98)
NONHDLC SERPL-MCNC: 161 MG/DL
P AXIS: 58 DEGREES
PA ADP BLD-ACNC: 450 ARU
PLATELET # BLD AUTO: 339 THOUSANDS/UL (ref 149–390)
PMV BLD AUTO: 8.4 FL (ref 8.9–12.7)
POTASSIUM SERPL-SCNC: 3.6 MMOL/L (ref 3.5–5.3)
PR INTERVAL: 160 MS
PROT SERPL-MCNC: 7.4 G/DL (ref 6.4–8.2)
QRS AXIS: 67 DEGREES
QRSD INTERVAL: 90 MS
QT INTERVAL: 326 MS
QTC INTERVAL: 422 MS
RBC # BLD AUTO: 4.95 MILLION/UL (ref 3.88–5.62)
SODIUM SERPL-SCNC: 133 MMOL/L (ref 136–145)
T WAVE AXIS: 40 DEGREES
TRIGL SERPL-MCNC: 152 MG/DL
VENTRICULAR RATE: 101 BPM
WBC # BLD AUTO: 11.76 THOUSAND/UL (ref 4.31–10.16)

## 2018-10-22 PROCEDURE — 99223 1ST HOSP IP/OBS HIGH 75: CPT | Performed by: INTERNAL MEDICINE

## 2018-10-22 PROCEDURE — 85576 BLOOD PLATELET AGGREGATION: CPT | Performed by: PSYCHIATRY & NEUROLOGY

## 2018-10-22 PROCEDURE — A9585 GADOBUTROL INJECTION: HCPCS | Performed by: INTERNAL MEDICINE

## 2018-10-22 PROCEDURE — 80053 COMPREHEN METABOLIC PANEL: CPT | Performed by: INTERNAL MEDICINE

## 2018-10-22 PROCEDURE — G8997 SWALLOW GOAL STATUS: HCPCS

## 2018-10-22 PROCEDURE — 92610 EVALUATE SWALLOWING FUNCTION: CPT

## 2018-10-22 PROCEDURE — 85027 COMPLETE CBC AUTOMATED: CPT | Performed by: INTERNAL MEDICINE

## 2018-10-22 PROCEDURE — 99255 IP/OBS CONSLTJ NEW/EST HI 80: CPT | Performed by: PSYCHIATRY & NEUROLOGY

## 2018-10-22 PROCEDURE — G8996 SWALLOW CURRENT STATUS: HCPCS

## 2018-10-22 PROCEDURE — 93010 ELECTROCARDIOGRAM REPORT: CPT | Performed by: INTERNAL MEDICINE

## 2018-10-22 PROCEDURE — 80307 DRUG TEST PRSMV CHEM ANLYZR: CPT | Performed by: INTERNAL MEDICINE

## 2018-10-22 PROCEDURE — 70553 MRI BRAIN STEM W/O & W/DYE: CPT

## 2018-10-22 PROCEDURE — 80061 LIPID PANEL: CPT | Performed by: PHYSICIAN ASSISTANT

## 2018-10-22 RX ORDER — ATORVASTATIN CALCIUM 80 MG/1
80 TABLET, FILM COATED ORAL
Status: DISCONTINUED | OUTPATIENT
Start: 2018-10-22 | End: 2018-10-26 | Stop reason: HOSPADM

## 2018-10-22 RX ORDER — CLOPIDOGREL BISULFATE 75 MG/1
225 TABLET ORAL ONCE
Status: COMPLETED | OUTPATIENT
Start: 2018-10-22 | End: 2018-10-22

## 2018-10-22 RX ORDER — CLOPIDOGREL BISULFATE 75 MG/1
75 TABLET ORAL DAILY
Status: DISCONTINUED | OUTPATIENT
Start: 2018-10-22 | End: 2018-10-26 | Stop reason: HOSPADM

## 2018-10-22 RX ORDER — ZOLPIDEM TARTRATE 5 MG/1
5 TABLET ORAL
Status: DISCONTINUED | OUTPATIENT
Start: 2018-10-22 | End: 2018-10-26 | Stop reason: HOSPADM

## 2018-10-22 RX ORDER — ASPIRIN 81 MG/1
324 TABLET ORAL DAILY
Status: DISCONTINUED | OUTPATIENT
Start: 2018-10-23 | End: 2018-10-26 | Stop reason: HOSPADM

## 2018-10-22 RX ORDER — LANOLIN ALCOHOL/MO/W.PET/CERES
6 CREAM (GRAM) TOPICAL
Status: DISCONTINUED | OUTPATIENT
Start: 2018-10-22 | End: 2018-10-26 | Stop reason: HOSPADM

## 2018-10-22 RX ORDER — LORAZEPAM 2 MG/ML
1 INJECTION INTRAMUSCULAR ONCE
Status: COMPLETED | OUTPATIENT
Start: 2018-10-22 | End: 2018-10-22

## 2018-10-22 RX ADMIN — HEPARIN SODIUM 5000 UNITS: 5000 INJECTION INTRAVENOUS; SUBCUTANEOUS at 06:17

## 2018-10-22 RX ADMIN — AMLODIPINE BESYLATE 10 MG: 10 TABLET ORAL at 09:09

## 2018-10-22 RX ADMIN — CLOPIDOGREL 225 MG: 75 TABLET, FILM COATED ORAL at 17:44

## 2018-10-22 RX ADMIN — GABAPENTIN 300 MG: 300 CAPSULE ORAL at 17:45

## 2018-10-22 RX ADMIN — SUCRALFATE 1 G: 1 TABLET ORAL at 09:10

## 2018-10-22 RX ADMIN — BACLOFEN 5 MG: 10 TABLET ORAL at 17:40

## 2018-10-22 RX ADMIN — LORAZEPAM 1 MG: 2 INJECTION INTRAMUSCULAR; INTRAVENOUS at 09:20

## 2018-10-22 RX ADMIN — GABAPENTIN 300 MG: 300 CAPSULE ORAL at 09:09

## 2018-10-22 RX ADMIN — LOSARTAN POTASSIUM 50 MG: 50 TABLET ORAL at 09:10

## 2018-10-22 RX ADMIN — PROPRANOLOL HYDROCHLORIDE 60 MG: 60 CAPSULE, EXTENDED RELEASE ORAL at 09:11

## 2018-10-22 RX ADMIN — SUCRALFATE 1 G: 1 TABLET ORAL at 17:40

## 2018-10-22 RX ADMIN — HEPARIN SODIUM 5000 UNITS: 5000 INJECTION INTRAVENOUS; SUBCUTANEOUS at 00:42

## 2018-10-22 RX ADMIN — DOCUSATE SODIUM 100 MG: 100 CAPSULE, LIQUID FILLED ORAL at 17:45

## 2018-10-22 RX ADMIN — ZOLPIDEM TARTRATE 5 MG: 5 TABLET, FILM COATED ORAL at 21:29

## 2018-10-22 RX ADMIN — POTASSIUM CHLORIDE 10 MEQ: 750 TABLET, EXTENDED RELEASE ORAL at 09:10

## 2018-10-22 RX ADMIN — DOCUSATE SODIUM 100 MG: 100 CAPSULE, LIQUID FILLED ORAL at 09:09

## 2018-10-22 RX ADMIN — PANTOPRAZOLE SODIUM 40 MG: 40 TABLET, DELAYED RELEASE ORAL at 17:40

## 2018-10-22 RX ADMIN — PANTOPRAZOLE SODIUM 40 MG: 40 TABLET, DELAYED RELEASE ORAL at 06:17

## 2018-10-22 RX ADMIN — FUROSEMIDE 20 MG: 20 TABLET ORAL at 17:40

## 2018-10-22 RX ADMIN — HEPARIN SODIUM 5000 UNITS: 5000 INJECTION INTRAVENOUS; SUBCUTANEOUS at 21:30

## 2018-10-22 RX ADMIN — HEPARIN SODIUM 5000 UNITS: 5000 INJECTION INTRAVENOUS; SUBCUTANEOUS at 13:07

## 2018-10-22 RX ADMIN — ASPIRIN 325 MG: 325 TABLET ORAL at 09:09

## 2018-10-22 RX ADMIN — BACLOFEN 5 MG: 10 TABLET ORAL at 09:08

## 2018-10-22 RX ADMIN — CLOPIDOGREL 75 MG: 75 TABLET, FILM COATED ORAL at 13:08

## 2018-10-22 RX ADMIN — GABAPENTIN 300 MG: 300 CAPSULE ORAL at 21:30

## 2018-10-22 RX ADMIN — GABAPENTIN 300 MG: 300 CAPSULE ORAL at 00:39

## 2018-10-22 RX ADMIN — GADOBUTROL 8 ML: 604.72 INJECTION INTRAVENOUS at 10:40

## 2018-10-22 RX ADMIN — FUROSEMIDE 20 MG: 20 TABLET ORAL at 09:09

## 2018-10-22 RX ADMIN — MELATONIN TAB 3 MG 6 MG: 3 TAB at 21:29

## 2018-10-22 RX ADMIN — ATORVASTATIN CALCIUM 20 MG: 10 TABLET, FILM COATED ORAL at 00:39

## 2018-10-22 RX ADMIN — ATORVASTATIN CALCIUM 80 MG: 80 TABLET, FILM COATED ORAL at 21:30

## 2018-10-22 RX ADMIN — METFORMIN HYDROCHLORIDE 500 MG: 500 TABLET, FILM COATED ORAL at 09:09

## 2018-10-22 RX ADMIN — METFORMIN HYDROCHLORIDE 500 MG: 500 TABLET, FILM COATED ORAL at 17:45

## 2018-10-22 NOTE — ED NOTES
No acute changes to CTA of head and neck per MD [facility], Neuro  Will continue to monitor neuro Q1H  Call bell in lap  Visitors (2) coming back        Grady Anderson RN  10/21/18 [id number]

## 2018-10-22 NOTE — SPEECH THERAPY NOTE
Speech-Language Pathology Bedside Swallow Evaluation      Patient Name: Grisel Torres    MQQOT'C Date: 10/22/2018     Problem List  Patient Active Problem List   Diagnosis    Urinary tract infection    Cellulitis    Diabetic neuropathy ([facility] )    Depression    Cervical spinal stenosis    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HCC)    Benign colon polyp    Benign prostatic hyperplasia    Chronic suprapubic catheter ([facility] )    Dyslipidemia    Esophageal reflux    Fatty liver    Generalized anxiety disorder    Glaucoma    Hyperlipidemia    Hypertension    Multiple sclerosis ([facility] )    Neurogenic bladder    Obstructive sleep apnea    Thyroid nodule    Type 2 diabetes mellitus ([facility] )    Urinary retention    CVA (cerebral vascular accident) ([facility] )       Past Medical History  Past Medical History:   Diagnosis Date    Acute laryngitis     Acute nonsuppurative otitis media, unspecified laterality     Arm weakness     Arthritis     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HCC)     Bladder infection     Bronchitis     Constipation     Cough     Diabetes mellitus (HCC)     Dizziness     Dysfunction of eustachian tube     Erectile dysfunction of non-organic origin     Fatigue     Glaucoma     Hiatal hernia     Hypertension     Imbalance     Leg muscle spasm     MS (multiple sclerosis) (HCC)     Nephrolithiasis     No natural teeth     Sinus pain     Spinal stenosis     Strain of thoracic region        Past Surgical History  Past Surgical History:   Procedure Laterality Date    APPENDECTOMY      BRAIN SURGERY      Coil placed in aneurysm    CYSTOSCOPY      CYSTOSCOPY      CYSTOSCOPY  06/11/2018    EYE SURGERY      transscleral cyclophotocoagulation noncontact YAG laser    MYRINGOTOMY      with ventilation tube insertion    SUPRAPUBIC CATHETER INSERTION         Summary   Pt presented with a mild oral dysphagia characterized by min prolonged mastication time   Intermittent dry cough observed after intake of milena cali  Risk for Aspiration: Minimal     Recommendations: regular diet and thin liquids     Recommended Form of Meds: whole with liquid     Aspiration precautions and compensatory swallowing strategies: upright posture and alternating bites and sips          Current Medical Status  Pt is a 70 y o  male who presented to [address] with right upper and lower extremity weakness  MRI shows a 1 2 cm focal area of diffusion restriction adjacent to the posterior body of the left lateral ventricle in the region of the posterior corona radiata without associated contrast enhancement compatible with acute lacunar infarct  Moderate periventricular and white matter T2 hyperintensities noted, unchanged from the previous study of September 19, 2016  Nonenhancing focus to suggest active demyelination  No acute hemorrhage seen     Past medical history:  Please see H&P for details      Special Studies:  Chest xray 10/21: No acute cardiopulmonary disease    Social/Education/Vocational Hx:  Pt lives with family      Swallow Information   Current Risks for Dysphagia & Aspiration: CVA     Current Symptoms/Concerns: coughing with po    Current Diet: regular diet and thin liquids      Baseline Diet: regular diet and thin liquids      Baseline Assessment   Behavior/Cognition: alert    Speech/Language Status: able to participate in conversation and able to follow commands    Patient Positioning: upright in bed    Pain Status/Interventions/Response to Interventions:  No report of or nonverbal indications of pain  Swallow Mechanism Exam   Oral-motor structures and function are WNL for symmetry, strength, ROM & coordination      Facial: symmetrical  Labial: WFL  Lingual: WFL  Velum: symmetrical  Mandible: adequate ROM  Dentition: edentulous  Vocal quality:clear/adequate       Consistencies Assessed and Performance   Consistencies Administered: thin liquids and hard solids  Specific materials administered included milena cracker and thin liquids     Oral Stage: mild  Mastication time was minimally prolonged with regular solids  The patient reports having dentures at home, but able to eat without them  Despite prolonged mastication, bolus formation and transfer were functional with no significant oral residue noted  No overt s/s reduced oral control  Pharyngeal Stage: WFL  Swallow Mechanics:  Swallowing initiation appeared prompt  Laryngeal rise was palpated and judged to be within functional limits  No coughing, throat clearing, change in vocal quality or respiratory status noted today  Ocassional dry cough observed  Unsure if related to PO intake  Esophageal Concerns: none reported    Summary and Recommendations (see above)      Results Reviewed with: patient     Treatment Recommended: Dysphagia therapy     Frequency of treatment: f/u x1    Dysphagia Goals per SLP: pt will tolerate regular diet  with thin liquids  without s/s of aspiration x1    Pt/Family Education: initiated  Pt and caregivers would benefit from/require continued education      Speech Therapy Prognosis   Prognosis: good    Prognosis Considerations: medical status and edentulous state

## 2018-10-22 NOTE — ED NOTES
Patient returns to CT for CTA, accompanied by primary RN on continuous monitoring          Isidra Phillips RN  10/21/18 7411

## 2018-10-22 NOTE — ED NOTES
Patient arrives via EMS  Patient taken direct to CT  Dr Samuel Camarillo immediately present for patient evaluation        Dain Lopez RN  10/21/18 8200

## 2018-10-22 NOTE — PLAN OF CARE
Activity Intolerance/Impaired Mobility     Mobility/activity is maintained at optimum level for patient [address] Discharge to home or other facility with appropriate resources Progressing        INFECTION - ADULT     Absence or prevention of progression during hospitalization Progressing        Knowledge Deficit     Patient/family/caregiver demonstrates understanding of disease process, treatment plan, medications, and discharge instructions Progressing        Neurological Deficit     Neurological status is stable or improving Progressing        NEUROSENSORY - ADULT     Achieves maximal functionality and self care Progressing        Nutrition     Nutrition/Hydration status is improving Progressing        PAIN - ADULT     Verbalizes/displays adequate comfort level or baseline comfort level Progressing        Potential for Aspiration     Non-ventilated patient's risk of aspiration is minimized Progressing        Potential for Falls     Patient will remain free of falls Progressing        Prexisting or High Potential for Compromised Skin Integrity     Skin integrity is maintained or improved Progressing        SAFETY ADULT     Maintain or return to baseline ADL function Progressing     Maintain or return mobility status to optimal level Progressing        SKIN/TISSUE INTEGRITY - ADULT     Skin integrity remains intact Progressing

## 2018-10-22 NOTE — PLAN OF CARE
Problem: SLP ADULT - SWALLOWING, IMPAIRED  Goal: Initial SLP swallow eval performed  Outcome: Completed Date Met: 10/22/18

## 2018-10-22 NOTE — UTILIZATION REVIEW
Initial Clinical Review    Admission: Date/Time/Statement: 10/21/18 @ 2210     Orders Placed This Encounter   Procedures    Inpatient Admission (expected length of stay for this patient is greater than two midnights)     Standing Status:   Standing     Number of Occurrences:   1     Order Specific Question:   Admitting Physician     Answer:   Arelis Corcoran [[id number]]     Order Specific Question:   Level of Care     Answer:   Med Surg [[id number]]     Order Specific Question:   Estimated length of stay     Answer:   More than 2 Midnights     Order Specific Question:   Certification     Answer:   I certify that inpatient services are medically necessary for this patient for a duration of greater than two midnights  See H&P and MD Progress Notes for additional information about the patient's course of treatment  ED: Date/Time/Mode of Arrival:   ED Arrival Information     Expected Arrival Acuity Means of Arrival Escorted By Service Admission Type    - 10/21/2018 21:05 Immediate Ambulance \[facility]\"" EMS General Medicine Emergency    Arrival Complaint    Stroke alert        Chief Complaint:   Chief Complaint   Patient presents with   [address]     Patient presents via EMS-prehospital stroke alert activated at 2104  Presents complaining of new onset right upper extremity weakness  Hx of MS, reports right lower extremity weakness at baseline but symptoms worsened this evening  Onset 2 hours ago  History of Illness: 69 yo M with PMH of DM2, MS, HLD, HTN presents to ED with new RUE weakness, and worsening RLE weakness  Has h/o MS - always weak on RLE  No new trauma/fall  Last known well was 2 hours prior to arrival (approx 7pm), states he tried to get out of a chair and noticed weakness  No fevers/chills/urinary issues (has suprapubic cath in place)  Pt has baseline sensory deficit in extremities - states that is not new   Facial sensory deficit is altered light touch to entire right side of face, which he states is new  Also has very mild right eyelid droop, which he states is new  RUE is weak (4/5) and finger to nose is slightly off, which is new  RLE is typically his "stronger side" and he is unable to lift off bed  NIH Stroke scale  7      ED Vital Signs:   ED Triage Vitals   Temperature Pulse Respirations Blood Pressure SpO2   10/21/18 2155 10/21/18 2120 10/21/18 2120 10/21/18 2120 10/21/18 2120   98 3 °F (36 8 °C) 103 18 (!) 196/72 96 %      Temp Source Heart Rate Source Patient Position - Orthostatic VS BP Location FiO2 (%)   10/21/18 2155 10/21/18 2120 10/21/18 2120 10/21/18 2120 --   Oral Monitor Lying Left arm       Pain Score       10/21/18 2120       No Pain        Wt Readings from Last 1 Encounters:   10/22/18 85 9 kg (189 lb 6 oz)     Vital Signs (abnormal):   10/21/18 2130 -- 98 18  182/83 97 % -- JW   10/21/18 2123 -- -- --  182/83 -- -- JW   10/21/18 2120 -- 103 18  196/72          Abnormal Labs/Diagnostic Test Results:   Na 131,   Cl 95,   Glu 199  WBC's 12 99    Urine:  Large leukocytes,  + nitrite,  Glu 250,  Small blood;  cx pending    CXR: No acute cardiopulmonary disease  CT Head: 1   No acute intracranial hemorrhage  2   Chronic small vessel ischemic changes  3   Stable basilar tip region aneurysm coil  4   If there is continued clinical concern for acute infarct, further evaluation with MRI may be obtained which is more sensitive       CTA Head & Neck: 1   Mild narrowing at the origin of the left common carotid artery, vertebral arteries bilaterally, and right internal carotid artery origins without significant stenosis  2   Approximately 50% narrowing at the origin of the left internal carotid artery  3   Mild narrowing of the cavernous to supraclinoid internal carotid arteries  4   No high-grade proximal stenosis to visualized [place]    5   Stable aneurysm coil seen at the basilar tip which limits evaluation due to streak artifact   Stable hyperdensity also likely reflecting a coil is seen within the right P1 segment which remains patent  ED Treatment:   Medication Administration from 10/21/2018 2105 to 10/21/2018 2256       Date/Time Order Dose Route Action Action by Comments     10/21/2018 2152 iohexol (OMNIPAQUE) 350 MG/ML injection (MULTI-DOSE) 90 mL 90 mL Intravenous Given       10/21/2018 2238 ceFAZolin (ANCEF) IVPB (premix) 1,000 mg 0 mg Intravenous Stopped       10/21/2018 2209 ceFAZolin (ANCEF) IVPB (premix) 1,000 mg 1,000 mg Intravenous New Bag            Past Medical/Surgical History:   Past Medical History:   Diagnosis Date    Acute laryngitis     Acute nonsuppurative otitis media, unspecified laterality     Arm weakness     Arthritis     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HCC)     Bladder infection     Bronchitis     Constipation     Cough     Diabetes mellitus (HCC)     Dizziness     Dysfunction of eustachian tube     Erectile dysfunction of non-organic origin     Fatigue     Glaucoma     Hiatal hernia     Hypertension     Imbalance     Leg muscle spasm     MS (multiple sclerosis) (HCC)     Nephrolithiasis     No natural teeth     Sinus pain     Spinal stenosis     Strain of thoracic region        Admitting Diagnosis: Multiple sclerosis (HCC) [G35]  TIA (transient ischemic attack) [G45 9]  [facility] ([facility] ) [I63 9]    Age/Sex: 70 y o  male    Assessment/Plan:  64m yo male admitted with  1 TIA vs CVA  Also patient's symptoms could certainly be explained by multiple sclerosis exacerbation however CVA will need to be ruled out initially       Admit to telemetry with serial neuro checks , MRI/MRA to assess acuity of brain infarcts, TTE to assess wall motion abnormalities in heart and to rule out any embolic events [place] and valvular thrombi better visualized with PASTOR, but more invasive test      Will restart ASA at 325 mg po daily  Neuro checks , NPO until swallow evaluation     PT/OT consults   HTN: Continue with permissive hypertension overnight 24-48 hours well use IV hydralazine or IV metoprolol prn SBP > 215 for increased coverage  Consult neurology     #2 multiple sclerosis exacerbation? Case discussed with neurology  Neurologist on-call recommends holding off steroids for now "Until he get MRI results tomorrow "     #3DM Type II:    Begin no concentrated carbohydrate diet  Cover with Aspart SSI as needed   Will order HgbA1C to assess status of recent glycemic control  Well initiate home medications once med rec is completed and confirmed by pharmacy         #4 History of HTN:       We will continue patient home medications  Although initially his blood pressure was higher in emergency department, it later stabilized  Well also initiate IV hydralazine and IV metoprolol for SBP greater than 1 60 mmHg  We will advise patient to follow-up with next PCP in regards to further better management of ongoing HTN     #5Hyperlipidemia:    Currently on statin therapy for elevated lipids  Previously not at goal of LDL < 100 as indicated in PMHx  Will order fasting lipid panel to assess status of hyperlipidemia  Will restart Atorvastatin 40 mg po QHS  Patient was counseled in regards to Appropriate nutritional and lifestyle modifications      #6 neurogenic bladder  Suprapubic catheter in place  Rocephin for UTI will be initiated      Anticipated Length of Stay:  Patient will be admitted on an Inpatient basis with an anticipated length of stay of  Greater than 2 midnights     Justification for Hospital Stay: CVA       Admission Orders:  IP  TELE  Neuro checks q1h x 4, q2h x 4,  q4h  Consult Neuro  PT / OT Eval  CBC, CMP in am  SCD's  MRI  Consult Speech    Scheduled Meds:   Current Facility-Administered Medications:  acetaminophen 650 mg Oral Q6H PRN Duane Ruzisandi   amLODIPine 10 mg Oral Daily Duane Kiranzisandi   aspirin 325 mg Oral Daily Duane Molina   atorvastatin 80 mg Oral HS Kenzie Jung PA-C   baclofen 5 mg Oral BID Duane Molina   docusate sodium 100 mg Oral BID [facility]   furosemide 20 mg Oral BID [facility]   gabapentin 300 mg Oral TID [facility]   heparin (porcine) 5,000 Units Subcutaneous Q8H [facility]   hydrALAZINE 10 mg Intravenous Q6H PRN [facility]   losartan 50 mg Oral Daily [facility]   metFORMIN 500 mg Oral BID With Meals [facility]   pantoprazole 40 mg Oral BID AC [facility]   potassium chloride 10 mEq Oral Daily [facility]   propranolol 60 mg Oral Daily [facility]   sucralfate 1 g Oral BID [facility]     Continuous Infusions:    PRN Meds:   acetaminophen    hydrALAZINE    MRI:   There is a 1 2 cm focal area of diffusion restriction adjacent to the posterior body of the left lateral ventricle in the region of the posterior corona radiata without associated contrast enhancement compatible with acute lacunar infarct  Moderate periventricular and white matter T2 hyperintensities noted, unchanged from the previous study of September 19, 2016  Nonenhancing focus to suggest active demyelination  No acute hemorrhage seen    Thank you,  [address]  Utilization Review Department  Phone: [phone]; Fax [phone]  ATTENTION: Please call with any questions or concerns to [phone]  and carefully follow the prompts so that you are directed to the right person  Send all requests for admission clinical reviews, approved or denied determinations and any other requests to fax [phone]   All voicemails are confidential

## 2018-10-22 NOTE — PROGRESS NOTES
Neurology note: This note reflects the actions taken at and during stroke alert of 22 October    Responded to medially to stroke alert generated through [address]  Emergency Department  Discussed immediately with the emergency department staff the current history which reflected both vascular risk factors and a history of multiple sclerosis and new findings of subtotal right upper and particularly lower extremity weakness  CT and CTA was immediately performed and I reviewed them with no acute or hyper acute changes on CT and some atherosclerotic disease in several more proximal intracerebral vessels but absolutely no flow limiting occlusion or stenosis  The patient's symptoms fluctuated mildly but were most significant for the right lower extremity weakness  Very uncertain with this patient's history of relapsing remitting multiple sclerosis with both brain and spinal cord involvement was the issue of exacerbation verses ischemia  With complete lack of cranial nerve, or large vessel symptom involvement the clinical picture suggested an exacerbation of MS versus small vessel/lacunar disease  After review of CTA and consideration with the emergency department staff I opted to not consider tPA in this setting with the probable or possible diagnosis of a multiple sclerosis relapse

## 2018-10-22 NOTE — ED PROVIDER NOTES
History  Chief Complaint   Patient presents with   [address]     Patient presents via EMS-prehospital stroke alert activated at 2104  Presents complaining of new onset right upper extremity weakness  Hx of MS, reports right lower extremity weakness at baseline but symptoms worsened this evening  Onset 2 hours ago  71 yo M with PMH of DM2, MS, HLD, HTN presents to ED with new RUE weakness, and worsening RLE weakness  Has h/o MS - always weak on RLE  No new trauma/fall  Last known well was 2 hours prior to arrival (approx 7pm), states he tried to get out of a chair and noticed weakness  No fevers/chills/urinary issues (has suprapubic cath in place)  History provided by:  Patient, medical records and EMS personnel   used: No    STROKE Alert   Location:  RUE, RLE  Severity:  Moderate  Onset quality:  Sudden  Duration:  2 hours  Timing:  Constant  Progression:  Unchanged  Chronicity:  New  Relieved by:  None tried  Worsened by:  None tried  Ineffective treatments:  None tried  Associated symptoms: shortness of breath    Associated symptoms: no abdominal pain, no chest pain, no congestion, no cough, no diarrhea, no ear pain, no fatigue, no fever, no headaches, no loss of consciousness, no myalgias, no nausea, no rash, no rhinorrhea, no sore throat, no vomiting and no wheezing        Prior to Admission Medications   Prescriptions Last Dose Informant Patient Reported? Taking? ALPRAZolam (XANAX) 0 25 mg tablet 10/20/2018 at Unknown time Self Yes Yes   Sig: Take 0 5 mg by mouth daily at bedtime as needed for anxiety or sleep   amLODIPine (NORVASC) 10 mg tablet 10/21/2018 at Unknown time Self Yes Yes   Sig: Take 10 mg by mouth daily  aspirin 325 mg tablet  Self Yes Yes   Sig: Take 325 mg by mouth daily  atorvastatin (LIPITOR) 20 mg tablet 10/20/2018 at Unknown time Self Yes Yes   Sig: Take 20 mg by mouth daily at bedtime     baclofen 10 mg tablet  Self Yes Yes   Sig: Take 2 5 mg by mouth daily     furosemide (LASIX) 20 mg tablet 10/20/2018 at Unknown time Self Yes Yes   Sig: Take 40 mg by mouth daily     gabapentin (NEURONTIN) 300 mg capsule  Self Yes Yes   Sig: Take 300 mg by mouth 3 (three) times a day  losartan (COZAAR) 50 mg tablet  Self Yes No   Sig: Take 50 mg by mouth daily  metFORMIN (GLUCOPHAGE) 500 mg tablet 10/21/2018 at Unknown time Self Yes Yes   Sig: Take 1,000 mg by mouth 2 (two) times a day with meals     pantoprazole (PROTONIX) 40 mg tablet 10/21/2018 at Unknown time Self No Yes   Sig: TAKE 1 TABLET TWICE DAILY 30 MINUTES BEFORE BREAKFAST AND DINNER  propranolol (INDERAL LA) 60 mg 24 hr capsule 10/21/2018 at Unknown time Self No Yes   Sig: TAKE 1 CAPSULE DAILY      Facility-Administered Medications: None       Past Medical History:   Diagnosis Date    Acute laryngitis     Acute nonsuppurative otitis media, unspecified laterality     Arm weakness     Arthritis     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HCC)     Bladder infection     Bronchitis     Constipation     Cough     Diabetes mellitus (HCC)     Dizziness     Dysfunction of eustachian tube     Erectile dysfunction of non-organic origin     Fatigue     Glaucoma     Hiatal hernia     Hypertension     Imbalance     Leg muscle spasm     MS (multiple sclerosis) (HCC)     Nephrolithiasis     No natural teeth     Sinus pain     Spinal stenosis     Strain of thoracic region        Past Surgical History:   Procedure Laterality Date    APPENDECTOMY      BRAIN SURGERY      Coil placed in aneurysm    CYSTOSCOPY      CYSTOSCOPY      CYSTOSCOPY  06/11/2018    EYE SURGERY      transscleral cyclophotocoagulation noncontact YAG laser    MYRINGOTOMY      with ventilation tube insertion    SUPRAPUBIC CATHETER INSERTION         History reviewed  No pertinent family history  I have reviewed and agree with the history as documented      Social History   Substance Use Topics    Smoking status: Former Smoker Types: Cigarettes    Smokeless tobacco: Never Used      Comment: smoked for 15-20 years, stopped about 25 years ago    Alcohol use No        Review of Systems   Constitutional: Negative for chills, diaphoresis, fatigue, fever and unexpected weight change  HENT: Negative for congestion, ear pain, rhinorrhea, sore throat, trouble swallowing and voice change  Eyes: Negative for pain and visual disturbance  Respiratory: Positive for shortness of breath  Negative for cough, chest tightness and wheezing  Cardiovascular: Negative for chest pain, palpitations and leg swelling  Gastrointestinal: Negative for abdominal pain, blood in stool, constipation, diarrhea, nausea and vomiting  Genitourinary: Negative for decreased urine volume, difficulty urinating, dysuria, flank pain, frequency and hematuria  Musculoskeletal: Negative for arthralgias, back pain, myalgias and neck pain  Skin: Negative for rash  Neurological: Positive for weakness and numbness  Negative for dizziness, loss of consciousness, syncope, light-headedness and headaches  Psychiatric/Behavioral: Negative for confusion and suicidal ideas  The patient is not nervous/anxious  Physical Exam  Physical Exam   Constitutional: He is oriented to person, place, and time  He appears well-developed and well-nourished  No distress  HENT:   Head: Normocephalic and atraumatic  Right Ear: External ear normal    Left Ear: External ear normal    Nose: Nose normal    Mouth/Throat: Oropharynx is clear and moist    Eyes: Pupils are equal, round, and reactive to light  Conjunctivae and EOM are normal  Right eye exhibits no discharge  Left eye exhibits no discharge  No scleral icterus  Neck: Normal range of motion  Neck supple  No JVD present  No tracheal deviation present  Cardiovascular: Normal rate, regular rhythm, normal heart sounds and intact distal pulses  Exam reveals no gallop and no friction rub      No murmur heard   Pulmonary/Chest: Effort normal and breath sounds normal  No stridor  No respiratory distress  He has no wheezes  He has no rales  He exhibits no tenderness  Abdominal: Soft  Bowel sounds are normal  He exhibits no distension  There is no tenderness  There is no rebound and no guarding  Genitourinary:   Genitourinary Comments: Suprapubic cath   Musculoskeletal: Normal range of motion  He exhibits no edema, tenderness or deformity  Lymphadenopathy:     He has no cervical adenopathy  Neurological: He is alert and oriented to person, place, and time  A cranial nerve deficit and sensory deficit is present  GCS eye subscore is 4  GCS verbal subscore is 5  GCS motor subscore is 6  Pt has baseline sensory deficit in extremities - states that is not new  Facial sensory deficit is altered light touch to entire right side of face, which he states is new  Also has very mild right eyelid droop, which he states is new  RUE is weak (4/5) and finger to nose is slightly off, which is new  RLE is typically his "stronger side" and he is unable to lift off bed  Skin: Skin is warm and dry  No rash noted  He is not diaphoretic  Psychiatric: He has a normal mood and affect  His behavior is normal    Nursing note and vitals reviewed        Vital Signs  ED Triage Vitals   Temperature Pulse Respirations Blood Pressure SpO2   10/21/18 2155 10/21/18 2120 10/21/18 2120 10/21/18 2120 10/21/18 2120   98 3 °F (36 8 °C) 103 18 (!) 196/72 96 %      Temp Source Heart Rate Source Patient Position - Orthostatic VS BP Location FiO2 (%)   10/21/18 2155 10/21/18 2120 10/21/18 2120 10/21/18 2120 --   Oral Monitor Lying Left arm       Pain Score       10/21/18 2120       No Pain           Vitals:    10/21/18 2156 10/21/18 2235 10/21/18 2240 10/21/18 2300   BP: 140/65  123/63 130/71   Pulse: 96 88 90 94   Patient Position - Orthostatic VS:    Lying       Visual Acuity      ED Medications  Medications   amLODIPine (NORVASC) tablet 10 mg (not administered)   aspirin tablet 325 mg (not administered)   baclofen tablet 5 mg (not administered)   losartan (COZAAR) tablet 50 mg (not administered)   sucralfate (CARAFATE) tablet 1 g (not administered)   metFORMIN (GLUCOPHAGE) tablet 500 mg (not administered)   gabapentin (NEURONTIN) capsule 300 mg (not administered)   atorvastatin (LIPITOR) tablet 20 mg (not administered)   furosemide (LASIX) tablet 20 mg (not administered)   potassium chloride (K-DUR,KLOR-CON) CR tablet 10 mEq (not administered)   pantoprazole (PROTONIX) EC tablet 40 mg (not administered)   propranolol (INDERAL LA) 24 hr capsule 60 mg (not administered)   docusate sodium (COLACE) capsule 100 mg (not administered)   heparin (porcine) subcutaneous injection 5,000 Units (not administered)   acetaminophen (TYLENOL) tablet 650 mg (not administered)   hydrALAZINE (APRESOLINE) injection 10 mg (not administered)   iohexol (OMNIPAQUE) 350 MG/ML injection (MULTI-DOSE) 90 mL (90 mL Intravenous Given 10/21/18 2152)   ceFAZolin (ANCEF) IVPB (premix) 1,000 mg (0 mg Intravenous Stopped 10/21/18 2238)       Diagnostic Studies  Results Reviewed     Procedure Component Value Units Date/Time    Urine Microscopic [[id number]]  (Abnormal) Collected:  10/21/18 2154    Lab Status:  Final result Specimen:  Urine from Urine, Suprapubic catheter Updated:  10/21/18 2225     RBC, UA 1-2 (A) /hpf      WBC, UA 30-50 (A) /hpf      Epithelial Cells None Seen /hpf      Bacteria, UA Innumerable (A) /hpf      WBC Clumps present    Urine culture [[id number]] Collected:  10/21/18 2154    Lab Status:   In process Specimen:  Urine from Urine, Suprapubic catheter Updated:  10/21/18 2225    Toxicology screen, urine [[id number]]     Lab Status:  No result Specimen:  Urine     UA w Reflex to Microscopic w Reflex to Culture [[id number]]  (Abnormal) Collected:  10/21/18 2154    Lab Status:  Final result Specimen:  Urine from Urine, Suprapubic catheter Updated:  10/21/18 2201     Color, UA Light Yellow     Clarity, UA Clear     Specific Gravity, UA 1 010     pH, UA 6 0     Leukocytes, UA Large (A)     Nitrite, UA Positive (A)     Protein, UA Negative mg/dl      Glucose,  (1/4%) (A) mg/dl      Ketones, UA Negative mg/dl      Urobilinogen, UA 0 2 E U /dl      Bilirubin, UA Negative     Blood, UA Small (A)    Basic metabolic panel [[id number]]  (Abnormal) Collected:  10/21/18 2114    Lab Status:  Final result Specimen:  Blood from Arm, Left Updated:  10/21/18 2135     Sodium 131 (L) mmol/L      Potassium 5 1 mmol/L      Chloride 95 (L) mmol/L      CO2 27 mmol/L      ANION GAP 9 mmol/L      BUN 10 mg/dL      Creatinine 1 04 mg/dL      Glucose 199 (H) mg/dL      Calcium 9 9 mg/dL      eGFR 73 ml/min/1 73sq m     Narrative:         National Kidney Disease Education Program recommendations are as follows:  GFR calculation is accurate only with a steady state creatinine  Chronic Kidney disease less than 60 ml/min/1 73 sq  meters  Kidney failure less than 15 ml/min/1 73 sq  meters      APTT [[id number]]  (Normal) Collected:  10/21/18 2114    Lab Status:  Final result Specimen:  Blood from Arm, Left Updated:  10/21/18 2133     PTT 28 seconds     Protime-INR [[id number]]  (Normal) Collected:  10/21/18 2114    Lab Status:  Final result Specimen:  Blood from Arm, Left Updated:  10/21/18 2133     Protime 12 3 seconds      INR 0 90    POCT Chem 8+ [[id number]]  (Abnormal) Collected:  10/21/18 2118    Lab Status:  Final result Updated:  10/21/18 2123     SODIUM, I-STAT 135 (L) mmol/l      Potassium, i-STAT 4 2 mmol/L      Chloride, istat 96 (L) mmol/L      CO2, i-STAT 27 mmol/L      Anion Gap, Istat 17 (H) mmol/L      Calcium, Ionized i-STAT 1 17 mmol/L      BUN, I-STAT 10 mg/dl      Creatinine, i-STAT 0 8 mg/dl      eGFR 91 ml/min/1 73sq m      Glucose, i-STAT 190 (H) mg/dl      Hct, i-STAT 50 (H) %      Hgb, i-STAT 17 0 g/dl      Specimen Type VENOUS    CBC [[id number]]  (Abnormal) Collected:  10/21/18 4333    Lab Status:  Final result Specimen:  Blood from Arm, Left Updated:  10/21/18 2119     WBC 12 99 (H) Thousand/uL      RBC 5 50 Million/uL      Hemoglobin 16 1 g/dL      Hematocrit 47 8 %      MCV 87 fL      MCH 29 3 pg      MCHC 33 7 g/dL      RDW 13 2 %      Platelets 491 Thousands/uL      MPV 8 1 (L) fL                  CTA stroke alert (head/neck)   Final Result by Winifred Urias MD (10/21 2205)      1  Mild narrowing at the origin of the left common carotid artery, vertebral arteries bilaterally, and right internal carotid artery origins without significant stenosis  2   Approximately 50% narrowing at the origin of the left internal carotid artery  3   Mild narrowing of the cavernous to supraclinoid internal carotid arteries  4   No high-grade proximal stenosis to visualized [place]  5   Stable aneurysm coil seen at the basilar tip which limits evaluation due to streak artifact  Stable hyperdensity also likely reflecting a coil is seen within the right P1 segment which remains patent  Workstation performed: [id number]         CT stroke alert brain   Final Result by Winifred Urias MD (10/21 2123)      1  No acute intracranial hemorrhage  2   Chronic small vessel ischemic changes  3   Stable basilar tip region aneurysm coil  4   If there is continued clinical concern for acute infarct, further evaluation with MRI may be obtained which is more sensitive          Findings were directly discussed with Cassy Mckeon on 10/21/2018 9:18 PM       Workstation performed: [id number]         X-ray chest 1 view portable    (Results Pending)   MRI inpatient order    (Results Pending)              Procedures  ECG 12 Lead Documentation  Date/Time: 10/21/2018 9:36 PM  Performed by: Mary Ann Rice  Authorized by: Mary Ann Rice     Indications / Diagnosis:  Stroke alert  ECG reviewed by me, the ED Provider: yes    Patient location:  ED  Previous ECG:     Previous ECG:  Compared to current    Similarity:  No change    Comparison to cardiac monitor: Yes    Interpretation:     Interpretation: abnormal    Quality:     Tracing quality:  Limited by artifact  Rate:     ECG rate:  101    ECG rate assessment: tachycardic    Rhythm:     Rhythm: sinus tachycardia    Ectopy:     Ectopy: none    QRS:     QRS axis:  Normal  Conduction:     Conduction: normal    ST segments:     ST segments:  Non-specific  T waves:     T waves: non-specific             Phone Contacts  ED Phone Contact    ED Course  ED Course as of Oct 22 0012   Sun Oct 21, 2018   2111 Discussed w/ Dr Mayur Wisdom    2121 Radiology called  No acute ICH  Small vessel ischemic changes, unchanged from previous  Basilar coiling present  2132 Discussed w/ Dr Mayur Wisdom, recommends CTA  NIHSS 7  Pt has baseline sensory deficit in extremities - states that is not new  Facial sensory deficit is altered light touch to entire right side of face, which he states is new  Also has very mild right eyelid droop, which he states is new  RUE is weak (4/5) and finger to nose is slightly off, which is new  RLE is typically his "stronger side" and he is unable to lift off bed  No other complaints other than chronic dyspnea, and intermittent foul smell to his urine - which he states has happened in the past when he eats certain things  No fevers/chills/CP/N/V or HA      2155 Discussed w/ Dr Mayur Wisdom  Does not see any concerning findings on CTA  Would recommend MRI w/and w/out contrast and if new lesions would recommend steroids for MS flare  Will discuss w/ hospitalist for admission  2210 Discussed w/ Dr Pollo Valverde  Accepted for admission  Abx ordered for probable UTI  Addendum 11/11/18: Per above: Discussed w/ Dr Mayur Wisdom  Does not see any concerning findings on CTA  Would recommend MRI w/and w/out contrast and if new lesions would recommend steroids for MS flare  Therefore no tPA given per his recommendation  Will discuss w/ hospitalist for admission  Stroke Assessment     Row Name 10/21/18 7489             NIH Stroke Scale    Interval Baseline      Level of Consciousness (1a ) 0      LOC Questions (1b ) 0      LOC Commands (1c ) 0      Best Gaze (2 ) 0      Visual (3 ) 0      Facial Palsy (4 ) 1      Motor Arm, Left (5a ) 0      Motor Arm, Right (5b ) 1      Motor Leg, Left (6a ) 0      Motor Leg, Right (6b ) 3      Limb Ataxia (7 ) 1      Sensory (8 ) 1      Best Language (9 ) 0      Dysarthria (10 ) 0      Extinction and Inattention (11 ) (Formerly Neglect) 0      Total 7                          MDM  Number of Diagnoses or Management Options  Multiple sclerosis ([place] [address] ): established and worsening  TIA (transient ischemic attack): new and requires workup  UTI (urinary tract infection): new and requires workup     Amount and/or Complexity of Data Reviewed  Clinical lab tests: ordered and reviewed  Tests in the radiology section of CPT®: ordered and reviewed  Tests in the medicine section of CPT®: ordered and reviewed  Review and summarize past medical records: yes  Discuss the patient with other providers: yes  Independent visualization of images, tracings, or specimens: yes    Risk of Complications, Morbidity, and/or Mortality  Presenting problems: high  Diagnostic procedures: moderate  Management options: low    Patient Progress  Patient progress: stable    CritCare Time    Disposition  Final diagnoses:   Multiple sclerosis ([place] [address] )   TIA (transient ischemic attack)   UTI (urinary tract infection)     Time reflects when diagnosis was documented in both MDM as applicable and the Disposition within this note     Time User Action Codes Description Comment    10/21/2018  9:10 PM Christelle Verma Multiple sclerosis ([facility] )     10/21/2018  9:10 PM Gaylen Brunner S Modify [G35] Multiple sclerosis ([facility] )     10/21/2018  9:10 PM Gaylen Brunner S Modify [G35] Multiple sclerosis ([facility] )     10/21/2018 10:09 PM Gaylen Brunner S Add [G45 9] TIA (transient ischemic attack)     10/22/2018 12:11 AM Rangel KURTZ Add [N39 0] UTI (urinary tract infection)       ED Disposition     ED Disposition Condition Comment    Admit  Case was discussed with Dr Barbie Spicer and the patient's admission status was agreed to be Admission Status: inpatient status to the service of Dr Barbie Spicer   Follow-up Information    None         Current Discharge Medication List      CONTINUE these medications which have NOT CHANGED    Details   ALPRAZolam (XANAX) 0 25 mg tablet Take 0 5 mg by mouth daily at bedtime as needed for anxiety or sleep      amLODIPine (NORVASC) 10 mg tablet Take 10 mg by mouth daily  aspirin 325 mg tablet Take 325 mg by mouth daily  atorvastatin (LIPITOR) 20 mg tablet Take 20 mg by mouth daily at bedtime  baclofen 10 mg tablet Take 2 5 mg by mouth daily        furosemide (LASIX) 20 mg tablet Take 40 mg by mouth daily        gabapentin (NEURONTIN) 300 mg capsule Take 300 mg by mouth 3 (three) times a day  metFORMIN (GLUCOPHAGE) 500 mg tablet Take 1,000 mg by mouth 2 (two) times a day with meals        pantoprazole (PROTONIX) 40 mg tablet TAKE 1 TABLET TWICE DAILY 30 MINUTES BEFORE BREAKFAST AND DINNER  Qty: 180 tablet, Refills: 1    Associated Diagnoses: Gastroesophageal reflux disease without esophagitis      propranolol (INDERAL LA) 60 mg 24 hr capsule TAKE 1 CAPSULE DAILY  Qty: 90 capsule, Refills: 3    Associated Diagnoses: Essential hypertension      losartan (COZAAR) 50 mg tablet Take 50 mg by mouth daily  STOP taking these medications       potassium chloride (K-DUR,KLOR-CON) 10 mEq tablet Comments:   Reason for Stopping:         sucralfate (CARAFATE) 1 g tablet Comments:   Reason for Stopping:             No discharge procedures on file      ED Provider  Electronically Signed by           Wayne Veliz MD  10/22/18 0746       Wayne Veliz MD  11/11/18 4360

## 2018-10-22 NOTE — CONSULTS
Consultation - Neurology   Leatha Kendrick 70 y o  male MRN: [id number]  Unit/Bed#: E4 -01 Encounter: [id number]      Assessment/Plan   1)  Acute L posterior corona radiata CVA- small vessel appearance, but will rule out embolic etiology   -MRI B demonstrates    -CTA demonstrates no hemodynamically significant stenosis, occlusion or dissection  Stable aneurysm coil seen at basilar tip      -tele   -Echo pending    -HbA1c, lipid profile significant for Cholesterol 200, Triglycerides 152, HDL 39 and     -PT/ OT/ Speech   -Will discontinue ASA and initiate plavix 75mg PO QD    -Will increase lipitor to 80mg PO QD    -Melatonin 6mg PO QHS sleep    -Will continue to follow, please monitor exam and notify with changes        History of Present Illness     Reason for Consult / Principal Problem: 1  Multiple sclerosis  Hx and PE limited by:  None  HPI: Leatha Kendrick is a 70 y o   male, known to Neurology and follows with Dr Janae Juarez PA-C, and Dr Juana Rangel of Neurosurgery, with a complicated past medical history including multiple sclerosis (on Baclofen only) 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, who presents to the [facility] ED as a stroke alert secondary to new onset right upper extremity weakness as well as worsened baseline right lower extremity weakness  Pt presented within 2 hours of symptom onset, stroke alert responded to via phone by Dr Katy Tejeda of Neurology  No tPA administered 2/2 unclear etiology of symptoms and no acute changes noted on CTA head and neck  MRI B later demonstrated acute lacunar-appearing CVA of the posterior corona radiata  Patient's complicated neurological history truncated from Ayse Cherry PA-C's office note from 01/12/2018:    "74 y/o male presents with brother for neurologic follow up, last seen in May 2017  Patient with PMH of MS diagnosed many years ago, DM and HTN, glaucoma  Patient diagnosed with probable MS in the 1960s  Actually unclear diagnosis from time of presentation to presentation to our center in 2014  Patient recalls at age of 11 having chest pain and then numbness of the left toes tingling up the leg to mid chest around T6 and then down the right side in similar distribution to the right foot  Patient had loss of control of bowel and bladder at that time  He had no ability to walk and was in the hospital for over 6 months  He was only able to ambulate with crutches in his 20s, 30s, 40s, etc  Patient still uses [employer] crutches as well as a wheelchair to ambulate  Patient was only ever given ACTH in the past  He was never on IMD meds prior to coming to our center in 2014  He was told he may have had a CVA that affected his walking  Patient with suprapubic catheter placed by Dr Koby Gonzaelz  Labs unremarkable, NMO negative  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  MRI c-spine did not reveal any lesions  MRI t-spine reveals cord lesion from T3-5; no comparison on file  Patient had last updated MRI c spine done on May 5 2015  Relatively stable appearance of the cervical spine  Mild to mod canal stenosis from c3-c7 inclusive  Minor flattening of the normal signal cord at c4/5 and c3/4    Pt has been seen by Dr Blas Cespedes for eval of significant c-spine disease  No surgery indicated  Pt with longstanding dizziness; he has strong family history of brain aneurysms- his sister had 3 aneurysms, 1 niece with 3 aneurysms, 1 niece who passed away from aneurysm and 1 nephew with aneurysm; pt's father with aneurysm in his abdominal area  He had CTA in January 2015, 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  He was seen neuro-surg and underwent 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 in March 2015 by Dr Kyler Olson of neuro-surg    (No longer with [facility])  visit in Sept 2016, patient reported an acute increase in blurriness of vision starting 2 weeks prior to the vision  He has had strabismus since youth and diplopia in the past  Our office coordinated with Dr Claudine Wells and patient was fit in to see him the next day  It was found he was starting to develop cataracts and also had some optic nerve head drusen  MRI and MRA head were ordered due to new visual symptoms  MRI brain 9/19/16 with mild age-appropriate volume loss  Mild white matter disease, stable compared to 12/2014  MRA head 9/19/16 possible 2mm outpouching arising from the posterior aspect of the basilar tip at the origin of the left posterior cerebral artery " Evaluated by Dr Leron Kehr, who reported MRA was stable in Nov, 2017  On exam today, the pt is resting comfortably in bed  He is polite, conversational, and appropriate, though he appears to have low medical literacy, and requested things be repeated on numerous occasions  The patient reports that he continues to have left-sided heaviness and sensory deficit a I can not make my right hand to I want it to do "  Additionally reports difficulty with sleep stating I was up all night because I kept thinking about my hand  A 12 point review systems was completed and is otherwise negative  Exam is significant for new right-sided sensory deficit including the face, right upper extremity weakness with drift and ataxia, not out of proportion to weakness, and flaccid paralysis of right lower extremity  The patient does have chronic neurological deficits secondary to MS, which are reflected in exam as detailed below  NIHSS 9, complicated by pt's prior neurological deficits         Inpatient consult to Neurology  Consult performed by: Nacho Whiting  Consult ordered by: Inga Samuel          Review of Systems   See HPI     Historical Information   Past Medical History:   Diagnosis Date    Acute laryngitis     Acute nonsuppurative otitis media, unspecified laterality     Arm weakness     Arthritis     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[address] )     Bladder infection     Bronchitis     Constipation     Cough     Diabetes mellitus (HCC)     Dizziness     Dysfunction of eustachian tube     Erectile dysfunction of non-organic origin     Fatigue     Glaucoma     Hiatal hernia     Hypertension     Imbalance     Leg muscle spasm     MS (multiple sclerosis) (HCC)     Nephrolithiasis     No natural teeth     Sinus pain     Spinal stenosis     Strain of thoracic region      Past Surgical History:   Procedure Laterality Date    APPENDECTOMY      BRAIN SURGERY      Coil placed in aneurysm    CYSTOSCOPY      CYSTOSCOPY      CYSTOSCOPY  06/11/2018    EYE SURGERY      transscleral cyclophotocoagulation noncontact YAG laser    MYRINGOTOMY      with ventilation tube insertion    SUPRAPUBIC CATHETER INSERTION       Social History   History   Alcohol Use No     History   Drug Use No     History   Smoking Status    Former Smoker    Types: Cigarettes   Smokeless Tobacco    Never Used     Comment: smoked for 15-20 years, stopped about 25 years ago     Family History: Strong family history of aneurysms, see above    Review of previous medical records was completed       Meds/Allergies   Scheduled Meds:  Current Facility-Administered Medications:  acetaminophen 650 mg Oral Q6H PRN [facility] Jesse   amLODIPine 10 mg Oral Daily [facility] Damaso   aspirin 325 mg Oral Daily [facility]   atorvastatin 20 mg Oral HS [facility]   baclofen 5 mg Oral BID [facility]   docusate sodium 100 mg Oral BID [facility]   furosemide 20 mg Oral BID [facility]   gabapentin 300 mg Oral TID [facility]   heparin (porcine) 5,000 Units Subcutaneous Q8H [address] [facility]   hydrALAZINE 10 mg Intravenous Q6H PRN [facility] Jesse   losartan 50 mg Oral Daily [facility]   metFORMIN 500 mg Oral BID With Meals [facility]   pantoprazole 40 mg Oral BID AC [facility]   potassium chloride 10 mEq Oral Daily [facility]   propranolol 60 mg Oral Daily [facility]   sucralfate 1 g Oral BID [facility]     Continuous Infusions:   PRN Meds:   acetaminophen    hydrALAZINE      No Known Allergies    Objective   Vitals:Blood pressure 126/60, pulse 86, temperature (!) 96 8 °F (36 °C), temperature source Temporal, resp  rate 20, height 5' 5" (1 651 m), weight 85 9 kg (189 lb 6 oz), SpO2 94 %  ,Body mass index is 31 51 kg/m²  Intake/Output Summary (Last 24 hours) at 10/22/18 0905  Last data filed at 10/22/18 0500   Gross per 24 hour   Intake               50 ml   Output             1675 ml   Net            -1625 ml       Invasive Devices: Invasive Devices     Peripheral Intravenous Line            Peripheral IV 10/21/18 Left Antecubital less than 1 day    Peripheral IV 10/21/18 Left Hand less than 1 day          Drain            Suprapubic Catheter 24 Fr  1006 days                Physical Exam   Constitutional: He is oriented to person, place, and time  He appears well-developed  No distress  Chronically ill-appearing    HENT:   Head: Normocephalic and atraumatic  Right Ear: External ear normal    Left Ear: External ear normal    Nose: Nose normal    Mouth/Throat: No oropharyngeal exudate  Eyes: Conjunctivae are normal  Right eye exhibits no discharge  Left eye exhibits no discharge  No scleral icterus  Neck: Normal range of motion  Neck supple  No tracheal deviation present  No thyromegaly present  Pulmonary/Chest: Effort normal and breath sounds normal    Neurological: He is oriented to person, place, and time  Reflex Scores:       Patellar reflexes are 4+ on the right side and 4+ on the left side  Achilles reflexes are 4+ on the right side and 4+ on the left side  Skin: He is not diaphoretic  Psychiatric: His speech is normal    Nursing note and vitals reviewed  Neurologic Exam     Mental Status   Oriented to person, place, and time  Follows 2 step commands     Attention: normal  Concentration: normal    Speech: speech is normal Level of consciousness: alert  Knowledge: poor  Normal comprehension  Cranial Nerves   Cranial nerves II through XII intact  With exception of:    ?mild L facial droop  L sensory deficit to pinprick, light touch and temperature      Motor Exam   Muscle bulk: normal  Right arm tone: decreased  Left arm tone: normal  Right leg tone: decreased  Left leg tone: normalR:  Delt: 4/5  Bi:3+/5  Tri: 3+/5  : 4/5  Hf: 0/5  KE /KF: 0/5  DF /PF: 0/5    L:  Delt: 5/5  Bi:5/5  Tri: 5/5  : 5/5  Hf: 2/5  DF /PF: 5/5     Sensory Exam     Sensation descreased to light touch, pinprick and temperature on the RUE and RLE as well as below the knee on the LLE      Gait, Coordination, and Reflexes     Tremor   Resting tremor: absent    Reflexes   Right patellar: 4+  Left patellar: 4+  Right achilles: 4+  Left achilles: 4+  Extensive b/l ankle clonus noted, roughly 8-9 beat     Unable to perform finger to nose on RUE 2/2 weakness, no ataxia noted on LUE        Lab Results: I have personally reviewed pertinent reports       Recent Results (from the past 24 hour(s))   APTT    Collection Time: 10/21/18  9:14 PM   Result Value Ref Range    PTT 28 24 - 36 seconds   Basic metabolic panel    Collection Time: 10/21/18  9:14 PM   Result Value Ref Range    Sodium 131 (L) 136 - 145 mmol/L    Potassium 5 1 3 5 - 5 3 mmol/L    Chloride 95 (L) 100 - 108 mmol/L    CO2 27 21 - 32 mmol/L    ANION GAP 9 4 - 13 mmol/L    BUN 10 5 - 25 mg/dL    Creatinine 1 04 0 60 - 1 30 mg/dL    Glucose 199 (H) 65 - 140 mg/dL    Calcium 9 9 8 3 - 10 1 mg/dL    eGFR 73 ml/min/1 73sq m   CBC    Collection Time: 10/21/18  9:14 PM   Result Value Ref Range    WBC 12 99 (H) 4 31 - 10 16 Thousand/uL    RBC 5 50 3 88 - 5 62 Million/uL    Hemoglobin 16 1 12 0 - 17 0 g/dL    Hematocrit 47 8 36 5 - 49 3 %    MCV 87 82 - 98 fL    MCH 29 3 26 8 - 34 3 pg    MCHC 33 7 31 4 - 37 4 g/dL    RDW 13 2 11 6 - 15 1 %    Platelets 480 926 - 627 Thousands/uL    MPV 8 1 (L) 8 9 - 12 7 fL   Protime-INR    Collection Time: 10/21/18  9:14 PM   Result Value Ref Range    Protime 12 3 11 8 - 14 2 seconds    INR 0 90 0 86 - 1 17   POCT Chem 8+    Collection Time: 10/21/18  9:18 PM   Result Value Ref Range    SODIUM, I-STAT 135 (L) 136 - 145 mmol/l    Potassium, i-STAT 4 2 3 5 - 5 3 mmol/L    Chloride, istat 96 (L) 100 - 108 mmol/L    CO2, i-STAT 27 21 - 32 mmol/L    Anion Gap, Istat 17 (H) 4 - 13 mmol/L    Calcium, Ionized i-STAT 1 17 1 12 - 1 32 mmol/L    BUN, I-STAT 10 5 - 25 mg/dl    Creatinine, i-STAT 0 8 0 6 - 1 3 mg/dl    eGFR 91 ml/min/1 73sq m    Glucose, i-STAT 190 (H) 65 - 140 mg/dl    Hct, i-STAT 50 (H) 36 5 - 49 3 %    Hgb, i-STAT 17 0 12 0 - 17 0 g/dl    Specimen Type VENOUS    UA w Reflex to Microscopic w Reflex to Culture    Collection Time: 10/21/18  9:54 PM   Result Value Ref Range    Color, UA Light Yellow     Clarity, UA Clear     Specific [place], UA 1 010 1 003 - 1 030    pH, UA 6 0 4 5 - 8 0    Leukocytes, UA Large (A) Negative    Nitrite, UA Positive (A) Negative    Protein, UA Negative Negative mg/dl    Glucose,  (1/4%) (A) Negative mg/dl    Ketones, UA Negative Negative mg/dl    Urobilinogen, UA 0 2 0 2, 1 0 E U /dl E U /dl    Bilirubin, UA Negative Negative    Blood, UA Small (A) Negative   Urine Microscopic    Collection Time: 10/21/18  9:54 PM   Result Value Ref Range    RBC, UA 1-2 (A) None Seen, 0-5 /hpf    WBC, UA 30-50 (A) None Seen, 0-5, 5-55, 5-65 /hpf    Epithelial Cells None Seen None Seen, Occasional /hpf    Bacteria, UA Innumerable (A) None Seen, Occasional /hpf    WBC Clumps present    Comprehensive metabolic panel    Collection Time: 10/22/18  5:45 AM   Result Value Ref Range    Sodium 133 (L) 136 - 145 mmol/L    Potassium 3 6 3 5 - 5 3 mmol/L    Chloride 99 (L) 100 - 108 mmol/L    CO2 24 21 - 32 mmol/L    ANION GAP 10 4 - 13 mmol/L    BUN 7 5 - 25 mg/dL    Creatinine 0 85 0 60 - 1 30 mg/dL    Glucose 205 (H) 65 - 140 mg/dL    Calcium 9 3 8 3 - 10 1 mg/dL    AST 18 5 - 45 U/L    ALT 43 12 - 78 U/L    Alkaline Phosphatase 85 46 - 116 U/L    Total Protein 7 4 6 4 - 8 2 g/dL    Albumin 3 1 (L) 3 5 - 5 0 g/dL    Total Bilirubin 0 25 0 20 - 1 00 mg/dL    eGFR 89 ml/min/1 73sq m   CBC (With Platelets)    Collection Time: 10/22/18  5:45 AM   Result Value Ref Range    WBC 11 76 (H) 4 31 - 10 16 Thousand/uL    RBC 4 95 3 88 - 5 62 Million/uL    Hemoglobin 14 4 12 0 - 17 0 g/dL    Hematocrit 42 6 36 5 - 49 3 %    MCV 86 82 - 98 fL    MCH 29 1 26 8 - 34 3 pg    MCHC 33 8 31 4 - 37 4 g/dL    RDW 13 3 11 6 - 15 1 %    Platelets 650 781 - 098 Thousands/uL    MPV 8 4 (L) 8 9 - 12 7 fL   ]    Imaging Studies: I have personally reviewed pertinent reports  and I have personally reviewed pertinent films in PACS  EKG, Pathology, and Other Studies: I have personally reviewed pertinent reports      VTE Prophylaxis: Sequential compression device [place] [employer])     Code Status: Level 1 - Full Code  Advance Directive and Living Will:      Power of :    POLST:

## 2018-10-22 NOTE — H&P
History and Physical - [facility] Internal Medicine    Patient Information: Desmond Law 70 y o  male MRN: [id number]  Unit/Bed#: ED 18 Encounter: [id number]  Admitting Physician: Pete Bhatt  PCP: Ramya Karimi DO  Date of Admission:  10/21/18    Assessment/Plan:    Hospital Problem List:     Principal Problem:    CVA (cerebral vascular accident) [facility])  Active Problems:    Diabetic neuropathy ([facility] )    Depression    Benign prostatic hyperplasia    Chronic suprapubic catheter ([facility] )    Dyslipidemia    Hyperlipidemia    Hypertension    Multiple sclerosis ([facility] )    Neurogenic bladder    Obstructive sleep apnea    Type 2 diabetes mellitus ([facility] )      Plan for the Primary Problem(s):    #1 TIA vs CVA  Also patient's symptoms could certainly be explained by multiple sclerosis exacerbation however CVA highly possible and patient will be admitted for that reason  Admit to telemetry with serial neuro checks   Will obtain MRI/MRA to assess acuity of brain infarcts  Will obtain TTE to assess wall motion abnormalities in heart and to rule out any embolic events Mural and valvular thrombi better visualized with PASTOR, but more invasive test      Will restart ASA at 325 mg po daily  Neuro checks q2hr initially on floor  e  NPO until swallow evaluation  PT/OT consults to assess rehab needs and work with residual weakness  HTN: Continue with permissive hypertension overnight 24-48 hours well use IV hydralazine or IV metoprolol prn SBP > 215 for increased coverage  Consult neurology    #2 multiple sclerosis exacerbation? Case discussed with neurology  Neurologist on-call recommends holding off steroids for now  "Until he get MRI results tomorrow "    #3DM Type II:    Begin no concentrated carbohydrate diet  Cover with Aspart SSI as needed   Will order HgbA1C to assess status of recent glycemic control  Well initiate home medications once med rec is completed and confirmed by pharmacy        #4 History of HTN: We will continue patient home medications  Although initially his blood pressure was higher in emergency department, it later stabilized  Well also initiate IV hydralazine and IV metoprolol for SBP greater than 1 60 mmHg  We will advise patient to follow-up with next PCP in regards to further better management of ongoing HTN  #5Hyperlipidemia:    Currently on statin therapy for elevated lipids  Previously not at goal of LDL < 100 as indicated in PMHx  Will order fasting lipid panel to assess status of hyperlipidemia  Will restart Atorvastatin 40 mg po QHS  Patient was counseled in regards to Appropriate nutritional and lifestyle modifications  #6 neurogenic bladder  Suprapubic catheter in place  Rocephin for UTI will be initiated        VTE Prophylaxis: Heparin  / sequential compression device   Code Status: code  POLST: There is no POLST form on file for this patient (pre-hospital)    Anticipated Length of Stay:  Patient will be admitted on an Inpatient basis with an anticipated length of stay of  Greater than 2 midnights  Justification for Hospital Stay: CVA    Total Time for Visit, including Counseling / Coordination of Care: 30 minutes  Greater than 50% of this total time spent on direct patient counseling and coordination of care  Chief Complaint:   "weakness "    History of Present Illness:    Grace Daley is a 70 y o  male who presents with  Right lower extremity weakness approximately 5 hours ago  patient endorses  PMH of DM2, MS, HLD, HTN presents to ED with new RUE weakness, and worsening RLE weakness  Has h/o MS - always weak on RLE  No new trauma/fall  Last known well was 2 hours prior to arrival (approx 7pm), states he tried to get out of a chair and noticed weakness  No fevers/chills/urinary issues (has suprapubic cath in place)  No recent exposure to ill contacts no recent vaccinations reported  He slid out of a stable CT had CTA head unremarkable    Neurology has seen these imaging requested hospitalist admission for CVA rule out and possible MS exacerbation  They have recommended MRI in a m  With and without gadolinium in holding off steroids for now      Review of Systems:    Review of Systems  12 point system reviewed are negative except as per HPI  Past Medical and Surgical History:     Past Medical History:   Diagnosis Date    Acute laryngitis     Acute nonsuppurative otitis media, unspecified laterality     Arm weakness     Arthritis     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HCC)     Bladder infection     Bronchitis     Constipation     Cough     Diabetes mellitus (HCC)     Dizziness     Dysfunction of eustachian tube     Erectile dysfunction of non-organic origin     Fatigue     Glaucoma     Hiatal hernia     Hypertension     Imbalance     Leg muscle spasm     MS (multiple sclerosis) (HCC)     Nephrolithiasis     No natural teeth     Sinus pain     Spinal stenosis     Strain of thoracic region        Past Surgical History:   Procedure Laterality Date    APPENDECTOMY      BRAIN SURGERY      CYSTOSCOPY      CYSTOSCOPY      CYSTOSCOPY  06/11/2018    EYE SURGERY      transscleral cyclophotocoagulation noncontact YAG laser    MYRINGOTOMY      with ventilation tube insertion    SUPRAPUBIC CATHETER INSERTION         Meds/Allergies:    Prior to Admission medications    Medication Sig Start Date End Date Taking? Authorizing Provider   amLODIPine (NORVASC) 10 mg tablet Take 10 mg by mouth daily  Historical Provider, MD   aspirin 325 mg tablet Take 325 mg by mouth daily  Historical Provider, MD   atorvastatin (LIPITOR) 20 mg tablet Take 20 mg by mouth daily at bedtime      Historical Provider, MD   baclofen 10 mg tablet Take 2 5 mg by mouth daily      Historical Provider, MD   furosemide (LASIX) 20 mg tablet Take 20 mg by mouth 2 (two) times a day    Historical Provider, MD   gabapentin (NEURONTIN) 300 mg capsule Take 300 mg by mouth 3 (three) times a day       Historical Provider, MD   losartan (COZAAR) 50 mg tablet Take 50 mg by mouth daily  Historical Provider, MD   metFORMIN (GLUCOPHAGE) 500 mg tablet Take 500 mg by mouth 2 (two) times a day with meals      Historical Provider, MD   pantoprazole (PROTONIX) 40 mg tablet TAKE 1 TABLET TWICE DAILY 30 MINUTES BEFORE BREAKFAST AND DINNER  3/13/18   Colette Pouch, DO   potassium chloride (K-DUR,KLOR-CON) 10 mEq tablet Take 10 mEq by mouth daily    Historical Provider, MD   propranolol (INDERAL LA) 60 mg 24 hr capsule TAKE 1 CAPSULE DAILY 4/2/18   Colette Pouch, DO   sucralfate (CARAFATE) 1 g tablet Take 1 g by mouth 2 (two) times a day  Historical Provider, MD     I have reviewed home medications with patient personally  Allergies: No Known Allergies    Social History:     Marital Status: Single     History   Alcohol Use No     History   Smoking Status    Former Smoker    Types: Cigarettes   Smokeless Tobacco    Never Used     Comment: smoked for 15-20 years, stopped about 25 years ago     History   Drug Use No       Family History:    non-contributory    Physical Exam:     Vitals:   Blood Pressure: 140/65 (10/21/18 2156)  Pulse: 96 (10/21/18 2156)  Temperature: 98 3 °F (36 8 °C) (10/21/18 2155)  Temp Source: Oral (10/21/18 2155)  Respirations: 15 (10/21/18 2156)  Weight - Scale: 92 5 kg (204 lb) (10/21/18 2154)  SpO2: 97 % (10/21/18 2156)    Physical Exam    On examination he appeared in good health and spirits  Vital signs as documented  Skin warm and dry and without overt rashes  Neck without JVD  Lungs clear  Heart exam notable for regular rhythm, normal sounds and absence of murmurs, rubs or gallops  Abdomen unremarkable and without evidence of organomegaly, masses, or abdominal aortic enlargement  Extremities nonedematous  suprapubic catheter in place without discharge or redness  Neurological: He is alert and oriented to person, place, and time   A cranial nerve deficit and sensory deficit is present  GCS eye subscore is 4  GCS verbal subscore is 5  GCS motor subscore is 6  Pt has baseline sensory deficit in extremities - states that is not new  Facial sensory deficit is altered light touch to entire right side of face, which he states is new  Also has very mild right eyelid droop, which he states is new  RUE is weak (4/5) and finger to nose is slightly off, which is new  RLE is typically his "stronger side" and he is unable to lift off bed  Skin: Skin is warm and dry  No rash noted  He is not diaphoretic  Psychiatric: He has a normal mood and affect  His behavior is normal    Additional Data:     Lab Results: I have personally reviewed pertinent reports  Results from last 7 days  Lab Units 10/21/18  2118 10/21/18  2114   WBC Thousand/uL  --  12 99*   HEMOGLOBIN g/dL  --  16 1   I STAT HEMOGLOBIN g/dl 17 0  --    HEMATOCRIT % 50* 47 8   PLATELETS Thousands/uL  --  374       Results from last 7 days  Lab Units 10/21/18  2118 10/21/18  2114   SODIUM mmol/L  --  131*   POTASSIUM mmol/L  --  5 1   CHLORIDE mmol/L  --  95*   CO2 mmol/L  --  27   BUN mg/dL  --  10   CREATININE mg/dL  --  1 04   CALCIUM mg/dL  --  9 9   GLUCOSE, ISTAT mg/dl 190*  --        Results from last 7 days  Lab Units 10/21/18  2114   INR  0 90       Imaging: I have personally reviewed pertinent reports  Ct Stroke Alert Brain    Result Date: 10/21/2018  Narrative: CT BRAIN - STROKE ALERT PROTOCOL INDICATION:   Stroke  COMPARISON:  CT of the head on March 8, 2017  TECHNIQUE:  CT examination of the brain was performed  In addition to axial images, coronal reformatted images were created and submitted for interpretation  Radiation dose length product (DLP) for this visit:  1075 mGy-cm   This examination, like all CT scans performed in the [facility], was performed utilizing techniques to minimize radiation dose exposure, including the use of iterative reconstruction and automated exposure control  IMAGE QUALITY:  Diagnostic  FINDINGS:  PARENCHYMA:  Decreased attenuation is noted in the supratentorial white matter demonstrating an appearance most consistent with mild microangiopathic change  No intracranial mass, mass effect or midline shift  No acute intracranial hemorrhage  No CT signs of acute infarction  Aneurysm coil is seen within the basilar tip region similar to prior exam  VENTRICLES AND EXTRA-AXIAL SPACES:  The ventricles are stable in size and configuration  VISUALIZED ORBITS AND PARANASAL SINUSES:  Unremarkable  CALVARIUM AND EXTRACRANIAL SOFT TISSUES:   Normal      Impression: 1  No acute intracranial hemorrhage  2   Chronic small vessel ischemic changes  3   Stable basilar tip region aneurysm coil  4   If there is continued clinical concern for acute infarct, further evaluation with MRI may be obtained which is more sensitive  Findings were directly discussed with Melvin Keith on 10/21/2018 9:18 PM  Workstation performed: [id number]     Cta Stroke Alert (head/neck)    Result Date: 10/21/2018  Narrative: CTA NECK AND BRAIN WITH CONTRAST INDICATION: Focal neuro deficit, new, fixed or worsening, <6 hours COMPARISON:   MRA of the head on 11/24/2017  CT of the head on March 8, 2017  TECHNIQUE: Post contrast imaging was performed after administration of iodinated contrast through the neck and brain  Post contrast axial 0 625 mm images timed to opacify the arterial system  3D rendering was performed on an independent workstation  MIP reconstructions performed  Coronal reconstructions were performed of the noncontrast portion of the brain  Radiation dose length product (DLP) for this visit:  694 mGy-cm   This examination, like all CT scans performed in the [facility], was performed utilizing techniques to minimize radiation dose exposure, including the use of iterative reconstruction and automated exposure control     IV Contrast:  90 mL of iohexol (OMNIPAQUE)  IMAGE QUALITY:   Diagnostic FINDINGS: CERVICAL VASCULATURE AORTIC ARCH AND GREAT VESSELS:  Moderate ahteroschlerotic disease of the arch and great vessels  Mild narrowing at the origin of the left common carotid artery  RIGHT VERTEBRAL ARTERY CERVICAL SEGMENT:  Mild narrowing at the origin  The vessel is normal in caliber throughout the neck  Scattered atherosclerotic calcifications  LEFT VERTEBRAL ARTERY CERVICAL SEGMENT:  Mild narrowing just distal to the origin  The vessel is normal in caliber throughout the neck  Scattered atherosclerotic ossifications  RIGHT EXTRACRANIAL CAROTID SEGMENT:  Moderate atherosclerotic disease of the bifurcation  There is mild narrowing of the right external carotid artery at the origin  There is mild narrowing of the right internal carotid artery at the origin  LEFT EXTRACRANIAL CAROTID SEGMENT:  Moderate atherosclerotic disease of the bifurcation  Approximately 50% narrowing at the origin of the left internal carotid artery  NASCET criteria was used to determine the degree of internal carotid artery diameter stenosis  INTRACRANIAL VASCULATURE INTERNAL CAROTID ARTERIES:  Mild narrowing due to calcification of the cavernous to supraclinoid internal carotid arteries bilaterally  Normal ophthalmic artery origins  Normal ICA terminus  ANTERIOR CIRCULATION:  Symmetric A1 segments and anterior cerebral arteries with normal enhancement  Normal anterior communicating artery  MIDDLE CEREBRAL ARTERY CIRCULATION:  M1 segments are patent  DISTAL VERTEBRAL ARTERIES:  Normal distal vertebral arteries  Posterior inferior cerebellar artery origins are normal  Normal vertebral basilar junction  BASILAR ARTERY:  Aneurysm coil seen at the basilar tip creating streak artifact limiting evaluation  A hyperdensity likely reflecting coil is also seen within the right P1 segment, stable  The distal P2 segment is patent   POSTERIOR CEREBRAL ARTERIES: Both posterior cerebral arteries arises from the basilar tip  Both arteries demonstrate normal enhancement  The posterior communicating arteries are hypoplastic or absent  DURAL VENOUS SINUSES:  Normal  NON VASCULAR ANATOMY BONY STRUCTURES:  No acute osseous abnormality  Multilevel degenerative changes  SOFT TISSUES OF THE NECK:  Unremarkable  THORACIC INLET:  Unremarkable  Impression: 1  Mild narrowing at the origin of the left common carotid artery, vertebral arteries bilaterally, and right internal carotid artery origins without significant stenosis  2   Approximately 50% narrowing at the origin of the left internal carotid artery  3   Mild narrowing of the cavernous to supraclinoid internal carotid arteries  4   No high-grade proximal stenosis to visualized [place]  5   Stable aneurysm coil seen at the basilar tip which limits evaluation due to streak artifact  Stable hyperdensity also likely reflecting a coil is seen within the right P1 segment which remains patent  Workstation performed: [id number]       EKG, Pathology, and Other Studies Reviewed on Admission:   · EKG: noncontributory    Allscripts / Epic Records Reviewed: No     ** Please Note: This note has been constructed using a voice recognition system   **

## 2018-10-22 NOTE — ED NOTES
Prehospital stroke alert activated        Loyda Camarena RN  10/21/18 2114       Loyda Camarena RN  10/21/18 2116

## 2018-10-23 ENCOUNTER — APPOINTMENT (INPATIENT)
Dept: NON INVASIVE DIAGNOSTICS | Facility: HOSPITAL | Age: 68
DRG: 065 | End: 2018-10-23
Payer: MEDICARE

## 2018-10-23 LAB
AMPHETAMINES UR QL SCN: NEGATIVE NG/ML
BARBITURATES UR QL SCN: NEGATIVE NG/ML
BENZODIAZ UR QL SCN: NEGATIVE NG/ML
BZE UR QL: NEGATIVE NG/ML
CANNABINOIDS UR QL SCN: NEGATIVE NG/ML
EST. AVERAGE GLUCOSE BLD GHB EST-MCNC: 194 MG/DL
GLUCOSE SERPL-MCNC: 188 MG/DL (ref 65–140)
HBA1C MFR BLD: 8.4 % (ref 4.2–6.3)
METHADONE UR QL SCN: NEGATIVE NG/ML
OPIATES UR QL: NEGATIVE NG/ML
PCP UR QL: NEGATIVE NG/ML
PROPOXYPH UR QL: NEGATIVE NG/ML
TSH SERPL DL<=0.05 MIU/L-ACNC: 3.18 UIU/ML (ref 0.36–3.74)

## 2018-10-23 PROCEDURE — 84443 ASSAY THYROID STIM HORMONE: CPT | Performed by: INTERNAL MEDICINE

## 2018-10-23 PROCEDURE — 83036 HEMOGLOBIN GLYCOSYLATED A1C: CPT | Performed by: PHYSICIAN ASSISTANT

## 2018-10-23 PROCEDURE — 97530 THERAPEUTIC ACTIVITIES: CPT

## 2018-10-23 PROCEDURE — G8988 SELF CARE GOAL STATUS: HCPCS

## 2018-10-23 PROCEDURE — 92526 ORAL FUNCTION THERAPY: CPT

## 2018-10-23 PROCEDURE — 99232 SBSQ HOSP IP/OBS MODERATE 35: CPT | Performed by: INTERNAL MEDICINE

## 2018-10-23 PROCEDURE — G8979 MOBILITY GOAL STATUS: HCPCS

## 2018-10-23 PROCEDURE — 97163 PT EVAL HIGH COMPLEX 45 MIN: CPT

## 2018-10-23 PROCEDURE — G8998 SWALLOW D/C STATUS: HCPCS

## 2018-10-23 PROCEDURE — 82948 REAGENT STRIP/BLOOD GLUCOSE: CPT

## 2018-10-23 PROCEDURE — 93306 TTE W/DOPPLER COMPLETE: CPT | Performed by: INTERNAL MEDICINE

## 2018-10-23 PROCEDURE — G8978 MOBILITY CURRENT STATUS: HCPCS

## 2018-10-23 PROCEDURE — G8987 SELF CARE CURRENT STATUS: HCPCS

## 2018-10-23 PROCEDURE — 93306 TTE W/DOPPLER COMPLETE: CPT

## 2018-10-23 PROCEDURE — 97167 OT EVAL HIGH COMPLEX 60 MIN: CPT

## 2018-10-23 RX ADMIN — SUCRALFATE 1 G: 1 TABLET ORAL at 08:22

## 2018-10-23 RX ADMIN — SUCRALFATE 1 G: 1 TABLET ORAL at 17:03

## 2018-10-23 RX ADMIN — GABAPENTIN 300 MG: 300 CAPSULE ORAL at 17:03

## 2018-10-23 RX ADMIN — FUROSEMIDE 20 MG: 20 TABLET ORAL at 17:03

## 2018-10-23 RX ADMIN — FUROSEMIDE 20 MG: 20 TABLET ORAL at 08:22

## 2018-10-23 RX ADMIN — CLOPIDOGREL 75 MG: 75 TABLET, FILM COATED ORAL at 08:23

## 2018-10-23 RX ADMIN — DOCUSATE SODIUM 100 MG: 100 CAPSULE, LIQUID FILLED ORAL at 08:22

## 2018-10-23 RX ADMIN — AMLODIPINE BESYLATE 10 MG: 10 TABLET ORAL at 08:22

## 2018-10-23 RX ADMIN — METFORMIN HYDROCHLORIDE 500 MG: 500 TABLET, FILM COATED ORAL at 08:22

## 2018-10-23 RX ADMIN — ASPIRIN 324 MG: 81 TABLET, COATED ORAL at 08:22

## 2018-10-23 RX ADMIN — GABAPENTIN 300 MG: 300 CAPSULE ORAL at 21:59

## 2018-10-23 RX ADMIN — ATORVASTATIN CALCIUM 80 MG: 80 TABLET, FILM COATED ORAL at 21:59

## 2018-10-23 RX ADMIN — HEPARIN SODIUM 5000 UNITS: 5000 INJECTION INTRAVENOUS; SUBCUTANEOUS at 06:19

## 2018-10-23 RX ADMIN — PROPRANOLOL HYDROCHLORIDE 60 MG: 60 CAPSULE, EXTENDED RELEASE ORAL at 08:21

## 2018-10-23 RX ADMIN — POTASSIUM CHLORIDE 10 MEQ: 750 TABLET, EXTENDED RELEASE ORAL at 08:23

## 2018-10-23 RX ADMIN — ZOLPIDEM TARTRATE 5 MG: 5 TABLET, FILM COATED ORAL at 23:59

## 2018-10-23 RX ADMIN — PANTOPRAZOLE SODIUM 40 MG: 40 TABLET, DELAYED RELEASE ORAL at 17:03

## 2018-10-23 RX ADMIN — HEPARIN SODIUM 5000 UNITS: 5000 INJECTION INTRAVENOUS; SUBCUTANEOUS at 13:21

## 2018-10-23 RX ADMIN — DOCUSATE SODIUM 100 MG: 100 CAPSULE, LIQUID FILLED ORAL at 17:03

## 2018-10-23 RX ADMIN — BACLOFEN 5 MG: 10 TABLET ORAL at 17:03

## 2018-10-23 RX ADMIN — MELATONIN TAB 3 MG 6 MG: 3 TAB at 21:59

## 2018-10-23 RX ADMIN — PANTOPRAZOLE SODIUM 40 MG: 40 TABLET, DELAYED RELEASE ORAL at 06:19

## 2018-10-23 RX ADMIN — BACLOFEN 5 MG: 10 TABLET ORAL at 08:23

## 2018-10-23 RX ADMIN — GABAPENTIN 300 MG: 300 CAPSULE ORAL at 08:23

## 2018-10-23 RX ADMIN — HEPARIN SODIUM 5000 UNITS: 5000 INJECTION INTRAVENOUS; SUBCUTANEOUS at 21:59

## 2018-10-23 RX ADMIN — LOSARTAN POTASSIUM 50 MG: 50 TABLET ORAL at 08:23

## 2018-10-23 RX ADMIN — METFORMIN HYDROCHLORIDE 500 MG: 500 TABLET, FILM COATED ORAL at 17:02

## 2018-10-23 NOTE — PROGRESS NOTES
[facility] Progress Note  Patient: Desmond Law 70 y o  male   MRN: [id number]  PCP: Ramya Karimi DO  Unit/Bed#: E4 -01 Encounter: [id number]  Date Of Visit: 10/23/18    Assessment:    Principal Problem:    CVA (cerebral vascular accident) [facility])  Active Problems:    Diabetic neuropathy ([facility] )    Depression    Benign prostatic hyperplasia    Chronic suprapubic catheter ([facility] )    Dyslipidemia    Hyperlipidemia    Hypertension    Multiple sclerosis ([facility] )    Neurogenic bladder    Obstructive sleep apnea    Type 2 diabetes mellitus ([facility] )      Plan:    · CVA, right upper and lower extremity weakness new in patient with known history of multiple sclerosis but MRI showing single restricted diffusion area in left corona radiata consistent with small vessel lacunar infarct which is acute aspirin discontinued in favor of Plavix 75 mg p o  Daily atorvastatin increased from 20 to 80 mg daily, 2D echocardiogram pending CTA was unremarkable for hemodynamic significant stenosis API study showed therapeutic response had been on 325 aspirin as outpatient  · Right-sided hay paresis which had been his dominant side in relation to his history of multiple sclerosis with neurogenic bladder and suprapubic catheter also per PT evaluation he has significantly increased risk of falls and weakness as result of acute CVA and will require acute rehab await final evaluation PT/OT  · Type 2 diabetes with neuropathy continues on metformin here 500 mg b i d   And sliding scale coverage  · Multiple sclerosis suprapubic catheter and baclofen 4 spasticity, gabapentin for neuropathy  · Hyperlipidemia  and may setting of acute CVA warrants maximal statin drug therapy at 80 mg and atorvastatin  · UTI with more than 100,000 gram-negative rods growing in urine however patient has suprapubic catheter would await culture results presume to be colonization minor leukocytosis  · Hypertension continue losartan and furosemide 20 mg b i d       VTE Pharmacologic Prophylaxis:   Pharmacologic: Heparin  Mechanical VTE Prophylaxis in Place: Yes    Discussions with Specialists or Other Care Team Provider:  Now    Time Spent for Care: 45 minutes  More than 50% of total time spent on counseling and coordination of care as described above  Subjective:   Pleasant remains quite weak and needs 2 person assist just to obtain upright status in bed without falling off acknowledges the need for acute rehab in  he works with as outpatient present in room and states he is only available to go to [facility] acute [facility] patient would like to go to [facility] but not available to him  Objective:     Vitals:   Temp (24hrs), Av 3 °F (36 8 °C), Min:97 6 °F (36 4 °C), Max:99 °F (37 2 °C)    Temp:  [97 6 °F (36 4 °C)-99 °F (37 2 °C)] 97 7 °F (36 5 °C)  HR:  [80-95] 88  Resp:  [18] 18  BP: (117-138)/(56-74) 125/58  SpO2:  [91 %-95 %] 91 %  Body mass index is 31 18 kg/m²  Input and Output Summary (last 24 hours):        Intake/Output Summary (Last 24 hours) at 10/23/18 1126  Last data filed at 10/23/18 6194   Gross per 24 hour   Intake              320 ml   Output              450 ml   Net             -130 ml       Physical Exam:     Physical Exam:   General appearance: alert, appears stated age and cooperative  Head: Normocephalic, without obvious abnormality, atraumatic  Lungs: clear to auscultation bilaterally  Heart: regular rate and rhythm  Abdomen: soft, non-tender; bowel sounds normal; no masses,  no organomegaly  Back: negative  Extremities: 2/5 strength right upper extremity summoned improvement in right lower extremity to 3 to 4 out of 5 strength very unsteady gait and needs 2 person assist  Neurologic: Motor: grade 2 biceps on the right      Additional Data:     Labs:      Results from last 7 days  Lab Units 10/22/18  0545   WBC Thousand/uL 11 76*   HEMOGLOBIN g/dL 14 4   HEMATOCRIT % 42 6   PLATELETS Thousands/uL 339 Results from last 7 days  Lab Units 10/22/18  0545 10/21/18  2118   SODIUM mmol/L 133*  --    POTASSIUM mmol/L 3 6  --    CHLORIDE mmol/L 99*  --    CO2 mmol/L 24  --    BUN mg/dL 7  --    CREATININE mg/dL 0 85  --    CALCIUM mg/dL 9 3  --    ALK PHOS U/L 85  --    ALT U/L 43  --    AST U/L 18  --    GLUCOSE, ISTAT mg/dl  --  190*       Results from last 7 days  Lab Units 10/21/18  2114   INR  0 90       * I Have Reviewed All Lab Data Listed Above  * Additional Pertinent Lab Tests Reviewed: All Labs For Current Hospital Admission Reviewed    Imaging:  X-ray Chest 1 View Portable    Result Date: 10/22/2018  Narrative: CHEST INDICATION:   stroke  COMPARISON:  Chest radiographs February 18, 2015 EXAM PERFORMED/VIEWS:  XR CHEST PORTABLE  AP semierect portable FINDINGS: Heart shadow appears unremarkable  Atherosclerotic vascular calcifications are noted  The lungs are clear  No pneumothorax or pleural effusion  Osseous structures appear within normal limits for patient age  Degenerative changes bilateral shoulders  Impression: No acute cardiopulmonary disease  Workstation performed: [id number]     Ct Stroke Alert Brain    Result Date: 10/21/2018  Narrative: CT BRAIN - STROKE ALERT PROTOCOL INDICATION:   Stroke  COMPARISON:  CT of the head on March 8, 2017  TECHNIQUE:  CT examination of the brain was performed  In addition to axial images, coronal reformatted images were created and submitted for interpretation  Radiation dose length product (DLP) for this visit:  1075 mGy-cm   This examination, like all CT scans performed in the [facility], was performed utilizing techniques to minimize radiation dose exposure, including the use of iterative reconstruction and automated exposure control  IMAGE QUALITY:  Diagnostic  FINDINGS:  PARENCHYMA:  Decreased attenuation is noted in the supratentorial white matter demonstrating an appearance most consistent with mild microangiopathic change   No intracranial mass, mass effect or midline shift  No acute intracranial hemorrhage  No CT signs of acute infarction  Aneurysm coil is seen within the basilar tip region similar to prior exam  VENTRICLES AND EXTRA-AXIAL SPACES:  The ventricles are stable in size and configuration  VISUALIZED ORBITS AND PARANASAL SINUSES:  Unremarkable  CALVARIUM AND EXTRACRANIAL SOFT TISSUES:   Normal      Impression: 1  No acute intracranial hemorrhage  2   Chronic small vessel ischemic changes  3   Stable basilar tip region aneurysm coil  4   If there is continued clinical concern for acute infarct, further evaluation with MRI may be obtained which is more sensitive  Findings were directly discussed with Lb Gross on 10/21/2018 9:18 PM  Workstation performed: [id number]     Mri Brain Ms Wo And W Contrast    Result Date: 10/22/2018  Narrative: MRI BRAIN WITH AND WITHOUT CONTRAST INDICATION:  as per neurology recommendations  Demyelinating disease  COMPARISON:  Previous study from September 19, 2016, December 11, 2014 TECHNIQUE:  Sagittal T1, axial T2, axial FLAIR, axial T1  Axial diffusion-weighted imaging  Axial [place], Sagittal FLAIR CUBE  Axial [id number]  Sagittal BRAVO post contrast  IV Contrast:  8 mL of gadobutrol injection (MULTI-DOSE) IMAGE QUALITY:  Diagnostic  FINDINGS: BRAIN PARENCHYMA: There is a focal area of foot diffusion restriction in the left deep periventricular region involving the posterior aspect of the corona radiate  This is T2 hyperintense and hypointense on the FLAIR sequences  There is no enhancement noted on the post contrast images  Moderate to periventricular and white matter T2 hyperintensities are noted  These are unchanged as the previous study  There is no T2 hyperintense lesion seen within the infratentorial compartment, cerebellar peduncles and in the visualized the cervical cord  Postcontrast imaging of the brain demonstrates no abnormal enhancement   There is no discrete mass, mass effect or midline shift  There is no intracranial hemorrhage  There is no evidence of acute infarction  VENTRICLES:  Normal  SELLA AND PITUITARY GLAND:  Normal  ORBITS:  Normal  PARANASAL SINUSES:  Normal  VASCULATURE:  Evaluation of the major intracranial vasculature demonstrates appropriate flow voids  CALVARIUM AND SKULL BASE:  Normal  EXTRACRANIAL SOFT TISSUES:  Normal      Impression: There is a 1 2 cm focal area of diffusion restriction adjacent to the posterior body of the left lateral ventricle in the region of the posterior corona radiata without associated contrast enhancement compatible with acute lacunar infarct Moderate periventricular and white matter T2 hyperintensities noted, unchanged from the previous study of September 19, 2016 Nonenhancing focus to suggest active demyelination No acute hemorrhage seen  I personally discussed this study with Jayme Rivera on 10/22/2018 at 12:20 PM   Workstation performed: [id number]     Cta Stroke Alert (head/neck)    Result Date: 10/21/2018  Narrative: CTA NECK AND BRAIN WITH CONTRAST INDICATION: Focal neuro deficit, new, fixed or worsening, <6 hours COMPARISON:   MRA of the head on 11/24/2017  CT of the head on March 8, 2017  TECHNIQUE: Post contrast imaging was performed after administration of iodinated contrast through the neck and brain  Post contrast axial 0 625 mm images timed to opacify the arterial system  3D rendering was performed on an independent workstation  MIP reconstructions performed  Coronal reconstructions were performed of the noncontrast portion of the brain  Radiation dose length product (DLP) for this visit:  694 mGy-cm   This examination, like all CT scans performed in the [place], was performed utilizing techniques to minimize radiation dose exposure, including the use of iterative reconstruction and automated exposure control     IV Contrast:  90 mL of iohexol (OMNIPAQUE)  IMAGE QUALITY: Diagnostic FINDINGS: CERVICAL VASCULATURE AORTIC ARCH AND GREAT VESSELS:  Moderate ahteroschlerotic disease of the arch and great vessels  Mild narrowing at the origin of the left common carotid artery  RIGHT VERTEBRAL ARTERY CERVICAL SEGMENT:  Mild narrowing at the origin  The vessel is normal in caliber throughout the neck  Scattered atherosclerotic calcifications  LEFT VERTEBRAL ARTERY CERVICAL SEGMENT:  Mild narrowing just distal to the origin  The vessel is normal in caliber throughout the neck  Scattered atherosclerotic ossifications  RIGHT EXTRACRANIAL CAROTID SEGMENT:  Moderate atherosclerotic disease of the bifurcation  There is mild narrowing of the right external carotid artery at the origin  There is mild narrowing of the right internal carotid artery at the origin  LEFT EXTRACRANIAL CAROTID SEGMENT:  Moderate atherosclerotic disease of the bifurcation  Approximately 50% narrowing at the origin of the left internal carotid artery  NASCET criteria was used to determine the degree of internal carotid artery diameter stenosis  INTRACRANIAL VASCULATURE INTERNAL CAROTID ARTERIES:  Mild narrowing due to calcification of the cavernous to supraclinoid internal carotid arteries bilaterally  Normal ophthalmic artery origins  Normal ICA terminus  ANTERIOR CIRCULATION:  Symmetric A1 segments and anterior cerebral arteries with normal enhancement  Normal anterior communicating artery  MIDDLE CEREBRAL ARTERY CIRCULATION:  M1 segments are patent  DISTAL VERTEBRAL ARTERIES:  Normal distal vertebral arteries  Posterior inferior cerebellar artery origins are normal  Normal vertebral basilar junction  BASILAR ARTERY:  Aneurysm coil seen at the basilar tip creating streak artifact limiting evaluation  A hyperdensity likely reflecting coil is also seen within the right P1 segment, stable  The distal P2 segment is patent  POSTERIOR CEREBRAL ARTERIES: Both posterior cerebral arteries arises from the basilar tip  Both arteries demonstrate normal enhancement  The posterior communicating arteries are hypoplastic or absent  DURAL VENOUS SINUSES:  Normal  NON VASCULAR ANATOMY BONY STRUCTURES:  No acute osseous abnormality  Multilevel degenerative changes  SOFT TISSUES OF THE NECK:  Unremarkable  THORACIC INLET:  Unremarkable  Impression: 1  Mild narrowing at the origin of the left common carotid artery, vertebral arteries bilaterally, and right internal carotid artery origins without significant stenosis  2   Approximately 50% narrowing at the origin of the left internal carotid artery  3   Mild narrowing of the cavernous to supraclinoid internal carotid arteries  4   No high-grade proximal stenosis to visualized [place]  5   Stable aneurysm coil seen at the basilar tip which limits evaluation due to streak artifact  Stable hyperdensity also likely reflecting a coil is seen within the right P1 segment which remains patent  Workstation performed: [id number]     Imaging Reports Reviewed Today Include:  CTA reviewed  Imaging Personally Reviewed by Myself Includes:    Procedure: X-ray Chest 1 View Portable    Result Date: 10/22/2018  Narrative: CHEST INDICATION:   stroke  COMPARISON:  Chest radiographs February 18, 2015 EXAM PERFORMED/VIEWS:  XR CHEST PORTABLE  AP semierect portable FINDINGS: Heart shadow appears unremarkable  Atherosclerotic vascular calcifications are noted  The lungs are clear  No pneumothorax or pleural effusion  Osseous structures appear within normal limits for patient age  Degenerative changes bilateral shoulders  Impression: No acute cardiopulmonary disease  Workstation performed: [id number]     Procedure: Ct Stroke Alert Brain    Result Date: 10/21/2018  Narrative: CT BRAIN - STROKE ALERT PROTOCOL INDICATION:   Stroke  COMPARISON:  CT of the head on March 8, 2017  TECHNIQUE:  CT examination of the brain was performed    In addition to axial images, coronal reformatted images were created and submitted for interpretation  Radiation dose length product (DLP) for this visit:  1075 mGy-cm   This examination, like all CT scans performed in the [facility], was performed utilizing techniques to minimize radiation dose exposure, including the use of iterative reconstruction and automated exposure control  IMAGE QUALITY:  Diagnostic  FINDINGS:  PARENCHYMA:  Decreased attenuation is noted in the supratentorial white matter demonstrating an appearance most consistent with mild microangiopathic change  No intracranial mass, mass effect or midline shift  No acute intracranial hemorrhage  No CT signs of acute infarction  Aneurysm coil is seen within the basilar tip region similar to prior exam  VENTRICLES AND EXTRA-AXIAL SPACES:  The ventricles are stable in size and configuration  VISUALIZED ORBITS AND PARANASAL SINUSES:  Unremarkable  CALVARIUM AND EXTRACRANIAL SOFT TISSUES:   Normal      Impression: 1  No acute intracranial hemorrhage  2   Chronic small vessel ischemic changes  3   Stable basilar tip region aneurysm coil  4   If there is continued clinical concern for acute infarct, further evaluation with MRI may be obtained which is more sensitive  Findings were directly discussed with Prabha Romero on 10/21/2018 9:18 PM  Workstation performed: [id number]     Procedure: Mri Brain Ms Wo And W Contrast    Result Date: 10/22/2018  Narrative: MRI BRAIN WITH AND WITHOUT CONTRAST INDICATION:  as per neurology recommendations  Demyelinating disease  COMPARISON:  Previous study from September 19, 2016, December 11, 2014 TECHNIQUE:  Sagittal T1, axial T2, axial FLAIR, axial T1  Axial diffusion-weighted imaging  Axial [place], Sagittal FLAIR CUBE  Axial [id number]  Sagittal BRAVO post contrast  IV Contrast:  8 mL of gadobutrol injection (MULTI-DOSE) IMAGE QUALITY:  Diagnostic   FINDINGS: BRAIN PARENCHYMA: There is a focal area of foot diffusion restriction in the left deep periventricular region involving the posterior aspect of the corona radiate  This is T2 hyperintense and hypointense on the FLAIR sequences  There is no enhancement noted on the post contrast images  Moderate to periventricular and white matter T2 hyperintensities are noted  These are unchanged as the previous study  There is no T2 hyperintense lesion seen within the infratentorial compartment, cerebellar peduncles and in the visualized the cervical cord  Postcontrast imaging of the brain demonstrates no abnormal enhancement  There is no discrete mass, mass effect or midline shift  There is no intracranial hemorrhage  There is no evidence of acute infarction  VENTRICLES:  Normal  SELLA AND PITUITARY GLAND:  Normal  ORBITS:  Normal  PARANASAL SINUSES:  Normal  VASCULATURE:  Evaluation of the major intracranial vasculature demonstrates appropriate flow voids  CALVARIUM AND SKULL BASE:  Normal  EXTRACRANIAL SOFT TISSUES:  Normal      Impression: There is a 1 2 cm focal area of diffusion restriction adjacent to the posterior body of the left lateral ventricle in the region of the posterior corona radiata without associated contrast enhancement compatible with acute lacunar infarct Moderate periventricular and white matter T2 hyperintensities noted, unchanged from the previous study of September 19, 2016 Nonenhancing focus to suggest active demyelination No acute hemorrhage seen  I personally discussed this study with Olga Brasher on 10/22/2018 at 12:20 PM   Workstation performed: [id number]     Procedure: Cta Stroke Alert (head/neck)    Result Date: 10/21/2018  Narrative: CTA NECK AND BRAIN WITH CONTRAST INDICATION: Focal neuro deficit, new, fixed or worsening, <6 hours COMPARISON:   MRA of the head on 11/24/2017  CT of the head on March 8, 2017  TECHNIQUE: Post contrast imaging was performed after administration of iodinated contrast through the neck and brain   Post contrast axial 0 625 mm images timed to opacify the arterial system  3D rendering was performed on an independent workstation  MIP reconstructions performed  Coronal reconstructions were performed of the noncontrast portion of the brain  Radiation dose length product (DLP) for this visit:  694 mGy-cm   This examination, like all CT scans performed in the [facility], was performed utilizing techniques to minimize radiation dose exposure, including the use of iterative reconstruction and automated exposure control  IV Contrast:  90 mL of iohexol (OMNIPAQUE)  IMAGE QUALITY:   Diagnostic FINDINGS: CERVICAL VASCULATURE AORTIC ARCH AND GREAT VESSELS:  Moderate ahteroschlerotic disease of the arch and great vessels  Mild narrowing at the origin of the left common carotid artery  RIGHT VERTEBRAL ARTERY CERVICAL SEGMENT:  Mild narrowing at the origin  The vessel is normal in caliber throughout the neck  Scattered atherosclerotic calcifications  LEFT VERTEBRAL ARTERY CERVICAL SEGMENT:  Mild narrowing just distal to the origin  The vessel is normal in caliber throughout the neck  Scattered atherosclerotic ossifications  RIGHT EXTRACRANIAL CAROTID SEGMENT:  Moderate atherosclerotic disease of the bifurcation  There is mild narrowing of the right external carotid artery at the origin  There is mild narrowing of the right internal carotid artery at the origin  LEFT EXTRACRANIAL CAROTID SEGMENT:  Moderate atherosclerotic disease of the bifurcation  Approximately 50% narrowing at the origin of the left internal carotid artery  NASCET criteria was used to determine the degree of internal carotid artery diameter stenosis  INTRACRANIAL VASCULATURE INTERNAL CAROTID ARTERIES:  Mild narrowing due to calcification of the cavernous to supraclinoid internal carotid arteries bilaterally  Normal ophthalmic artery origins  Normal ICA terminus  ANTERIOR CIRCULATION:  Symmetric A1 segments and anterior cerebral arteries with normal enhancement  Normal anterior communicating artery  MIDDLE CEREBRAL ARTERY CIRCULATION:  M1 segments are patent  DISTAL VERTEBRAL ARTERIES:  Normal distal vertebral arteries  Posterior inferior cerebellar artery origins are normal  Normal vertebral basilar junction  BASILAR ARTERY:  Aneurysm coil seen at the basilar tip creating streak artifact limiting evaluation  A hyperdensity likely reflecting coil is also seen within the right P1 segment, stable  The distal P2 segment is patent  POSTERIOR CEREBRAL ARTERIES: Both posterior cerebral arteries arises from the basilar tip  Both arteries demonstrate normal enhancement  The posterior communicating arteries are hypoplastic or absent  DURAL VENOUS SINUSES:  Normal  NON VASCULAR ANATOMY BONY STRUCTURES:  No acute osseous abnormality  Multilevel degenerative changes  SOFT TISSUES OF THE NECK:  Unremarkable  THORACIC INLET:  Unremarkable  Impression: 1  Mild narrowing at the origin of the left common carotid artery, vertebral arteries bilaterally, and right internal carotid artery origins without significant stenosis  2   Approximately 50% narrowing at the origin of the left internal carotid artery  3   Mild narrowing of the cavernous to supraclinoid internal carotid arteries  4   No high-grade proximal stenosis to visualized [place]  5   Stable aneurysm coil seen at the basilar tip which limits evaluation due to streak artifact  Stable hyperdensity also likely reflecting a coil is seen within the right P1 segment which remains patent   Workstation performed: [id number]        Recent Cultures (last 7 days):       Results from last 7 days  Lab Units 10/21/18  2154   URINE CULTURE  >100,000 cfu/ml Gram Negative [place]*       Last 24 Hours Medication List:     Current Facility-Administered Medications:  acetaminophen 650 mg Oral Q6H PRN Duane Molina   amLODIPine 10 mg Oral Daily Duane Mloina   aspirin 324 mg Oral Daily Obed Young DO   atorvastatin 80 mg Oral HS Kenzie Jung PA-C   baclofen 5 mg Oral BID [facility]   clopidogrel 75 mg Oral Daily Kenzie Jung PA-C   docusate sodium 100 mg Oral BID [facility]   furosemide 20 mg Oral BID [facility]   gabapentin 300 mg Oral TID [facility]   heparin (porcine) 5,000 Units Subcutaneous Q8H [address] [facility]   hydrALAZINE 10 mg Intravenous Q6H PRN [facility]   losartan 50 mg Oral Daily [facility]   melatonin 6 mg Oral HS Kenzie Jung PA-C   metFORMIN 500 mg Oral BID With Meals [facility]   pantoprazole 40 mg Oral BID AC [facility]   perflutren lipid microsphere 1 mL/min Intravenous Once in imaging Obed Young, DO   potassium chloride 10 mEq Oral Daily [facility]   propranolol 60 mg Oral Daily [facility]   sucralfate 1 g Oral BID [facility]   zolpidem 5 mg Oral HS PRN Angela Brunner        Today, Patient Was Seen By: Shauna Hill MD    ** Please Note: Dragon 360 Dictation voice to text software may have been used in the creation of this document   **

## 2018-10-23 NOTE — UTILIZATION REVIEW
Continued Stay Review    Date: 10/23/2018    Vital Signs: /61 (BP Location: Right arm)   Pulse 89   Temp (!) 97 1 °F (36 2 °C) (Tympanic)   Resp 18   Ht 5' 5" (1 651 m)   Wt 85 kg (187 lb 6 3 oz)   SpO2 94%   BMI 31 18 kg/m²     Medications:   Scheduled Meds:   Current Facility-Administered Medications:  acetaminophen 650 mg Oral Q6H PRN [facility]   amLODIPine 10 mg Oral Daily [facility]   aspirin 324 mg Oral Daily Obed Young DO   atorvastatin 80 mg Oral HS [facility] Johansen-C   baclofen 5 mg Oral BID [facility]   clopidogrel 75 mg Oral Daily Kenzie Jung PA-C   docusate sodium 100 mg Oral BID [facility]   furosemide 20 mg Oral BID [facility]   gabapentin 300 mg Oral TID [facility]   heparin (porcine) 5,000 Units Subcutaneous Q8H [address] [facility]   hydrALAZINE 10 mg Intravenous Q6H PRN [facility]   losartan 50 mg Oral Daily [facility]   melatonin 6 mg Oral HS Kenzie Jung PA-C   metFORMIN 500 mg Oral BID With Meals [facility]   pantoprazole 40 mg Oral BID AC [facility]   perflutren lipid microsphere 1 mL/min Intravenous Once in imaging Obed Young DO   potassium chloride 10 mEq Oral Daily [facility]   propranolol 60 mg Oral Daily [facility]   sucralfate 1 g Oral BID [facility]   zolpidem 5 mg Oral HS PRN [facility] Ruzi     Continuous Infusions:    PRN Meds:   acetaminophen    hydrALAZINE    perflutren lipid microsphere    zolpidem    Abnormal Labs/Diagnostic Results:   HgA1C 8 4    Age/Sex: 70 y o  male      71 yo male Remains quite weak and needs 2 person assist just to obtain upright status in bed without falling     · Assessment/Plan: CVA, right upper and lower extremity weakness new in patient with known history of multiple sclerosis but MRI showing single restricted diffusion area in left corona radiata consistent with small vessel lacunar infarct which is acute aspirin discontinued in favor of Plavix 75 mg p o  Daily atorvastatin increased from 20 to 80 mg daily, 2D echocardiogram pending CTA was unremarkable for hemodynamic significant stenosis API study showed therapeutic response had been on 325 aspirin as outpatient  · Right-sided hay paresis which had been his dominant side in relation to his history of multiple sclerosis with neurogenic bladder and suprapubic catheter also per PT evaluation he has significantly increased risk of falls and weakness as result of acute CVA and will require acute rehab await final evaluation PT/OT  · Type 2 diabetes with neuropathy continues on metformin here 500 mg b i d  And sliding scale coverage  · Multiple sclerosis suprapubic catheter and baclofen 4 spasticity, gabapentin for neuropathy  · Hyperlipidemia  and may setting of acute CVA warrants maximal statin drug therapy at 80 mg and atorvastatin  · UTI with more than 100,000 gram-negative rods growing in urine however patient has suprapubic catheter would await culture results presume to be colonization minor leukocytosis  · Hypertension continue losartan and furosemide 20 mg b i d        Discharge Plan: MSW from [address] [place] [address] [phone] ext [phone] visited today stating she talked with PT and pt will need a short stay  [facility]  Referral made to both SNF today      Thank you,  [address]  Utilization Review Department  Phone: [phone]; Fax [phone]  ATTENTION: Please call with any questions or concerns to [phone]  and carefully follow the prompts so that you are directed to the right person  Send all requests for admission clinical reviews, approved or denied determinations and any other requests to fax [phone]   All voicemails are confidential

## 2018-10-23 NOTE — SOCIAL WORK
MSW from [address] [place] [address] [phone] visited today stating she talked with PT and pt will need a short stay  The only SNF that pt can go to are [address] [place] [address],[address] and [address] [facility]  Referral made to both SNF today  Foster Sat reports if pt needs a ride he must use [facility] [employer] will pay for transport  CM will f/u with assessment

## 2018-10-23 NOTE — SPEECH THERAPY NOTE
Speech Language/Pathology    Speech/Language Pathology Progress Note    Patient Name: Ilana Flair  [id number] Date: 10/23/2018     Problem List  Patient Active Problem List   Diagnosis    Urinary tract infection    Cellulitis    Diabetic neuropathy ([facility] )    Depression    Cervical spinal stenosis    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HCC)    Benign colon polyp    Benign prostatic hyperplasia    Chronic suprapubic catheter ([facility] )    Dyslipidemia    Esophageal reflux    Fatty liver    Generalized anxiety disorder    Glaucoma    Hyperlipidemia    Hypertension    Multiple sclerosis ([facility] )    Neurogenic bladder    Obstructive sleep apnea    Thyroid nodule    Type 2 diabetes mellitus ([facility] )    Urinary retention    [facility])        Past Medical History  Past Medical History:   Diagnosis Date    Acute laryngitis     Acute nonsuppurative otitis media, unspecified laterality     Arm weakness     Arthritis     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HCC)     Bladder infection     Bronchitis     Constipation     Cough     Diabetes mellitus (HCC)     Dizziness     Dysfunction of eustachian tube     Erectile dysfunction of non-organic origin     Fatigue     Glaucoma     Hiatal hernia     Hypertension     Imbalance     Leg muscle spasm     MS (multiple sclerosis) (HCC)     Nephrolithiasis     No natural teeth     Sinus pain     Spinal stenosis     Strain of thoracic region         Past Surgical History  Past Surgical History:   Procedure Laterality Date    APPENDECTOMY      BRAIN SURGERY      Coil placed in aneurysm    CYSTOSCOPY      CYSTOSCOPY      CYSTOSCOPY  06/11/2018    EYE SURGERY      transscleral cyclophotocoagulation noncontact YAG laser    MYRINGOTOMY      with ventilation tube insertion    SUPRAPUBIC CATHETER INSERTION           Subjective:  Pt alert and pleasant  Objective:  Pt seen for f/u at breakfast  Speech is clear  No facial asymmetry   Tolerated fresh fruit, toast, eggs wfl  States he has dentures but never wears them and can eat most foods  Assessment:  Speech clear  Tolerating regular diet  Plan/Recommendations:  D/c ST

## 2018-10-23 NOTE — PLAN OF CARE
Problem: OCCUPATIONAL THERAPY ADULT  Goal: Performs self-care activities at highest level of function for planned discharge setting  See evaluation for individualized goals  Treatment Interventions: ADL retraining, Functional transfer training, UE strengthening/ROM, Endurance training, Cognitive reorientation, Patient/family training, Visual perceptual retraining, Neuromuscular reeducation, Equipment evaluation/education, Compensatory technique education, Energy conservation, Activityengagement, Fine motor coordination activities, Continued evaluation          See flowsheet documentation for full assessment, interventions and recommendations  Limitation: Decreased ADL status, Decreased Safe judgement during ADL, Decreased UE strength, Decreased cognition, Decreased endurance, Decreased self-care trans, Decreased high-level ADLs, Decreased sensation, Decreased UE ROM, Decreased fine motor control, Visual deficit (R UE hemiparesis)  Prognosis: Good  Assessment: Pt is a 70 y o  male seen for OT evaluation s/p admit to [facility] on 10/21/2018 w/ R LE/UE weakness; CVA (cerebral vascular accident) ([facility] [address] )  MRI revealed: L posterior corona radiata CVA  Comorbidities affecting pt's functional performance at time of assessment include: DM II w/ diabetic neuropathy, multiple sclerosis, HLD, HTN, depression, neurogenic bladder  Personal factors affecting pt at time of IE include: decreased insight into deficits  Prior to admission, pt was living w/ brother and sister and reports independent w/ ADLs, independent w/ functional transfers and mobility w/ [place] crutches, assist for IADLs from sister and transportation from brother and sister   Upon evaluation: Pt requires MAX assist x2 supine>sit bed mobility, MAX assist LB ADLS, MOD assist UB ADLS, MAX assist toileting, MOD assist grooming/self-feeding 2* the following deficits impacting occupational performance: R UE hemiparesis (2-/2/5 MMT see above (R hand dominant at baseline), R UE impaired coordination w/ increased time to complete, R LE weakness, b/l LE impaired proprioception, impaired sitting balance (w/ posterior lean initially, able to sit unsupported 3-5 minutes w/ hands on lap), impaired trunk control, impaired activity tolerance, decreased endurance, impaired insight into deficits, impaired vision at baseline (glaucoma)  Pt not appropriate for OOB transfers due to impaired core strength and R hemiparesis, impaired sitting balance; OTR to assess next session w/ possible use of quick move  Pt to benefit from continued skilled OT tx while in the hospital to address deficits as defined above and maximize level of functional independence w ADL's and functional mobility  Occupational Performance areas to address include: eating, grooming, bathing/shower, toilet hygiene, dressing, functional mobility and clothing management, visual assessment, B/L UE exercises and coordination exercises  From OT standpoint, recommendation at time of d/c would be short term rehab, pt requires intensive rehab and is motivated to participate in session and regain independence     Recommendation: Physiatry Consult  OT Discharge Recommendation: Short Term Rehab  OT - OK to Discharge:  (to rehab when medically stable)      Comments: Blu Thornton MS, OTR/L

## 2018-10-23 NOTE — OCCUPATIONAL THERAPY NOTE
OccupationalTherapy Evaluation and Treatment (eval: 10:43-10:13, treatment: 11:28-11:44)     Patient Name: Ann FLAHERTY Date: 10/23/2018  Problem List  Patient Active Problem List   Diagnosis    Urinary tract infection    Cellulitis    Diabetic neuropathy ([facility] [address] )    Depression    Cervical spinal stenosis    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HCC)    Benign colon polyp    Benign prostatic hyperplasia    Chronic suprapubic catheter ([facility] [address] )    Dyslipidemia    Esophageal reflux    Fatty liver    Generalized anxiety disorder    Glaucoma    Hyperlipidemia    Hypertension    Multiple sclerosis ([facility] [address] )    Neurogenic bladder    Obstructive sleep apnea    Thyroid nodule    Type 2 diabetes mellitus ([address] )    Urinary retention    [facility])     Past Medical History  Past Medical History:   Diagnosis Date    Acute laryngitis     Acute nonsuppurative otitis media, unspecified laterality     Arm weakness     Arthritis     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HCC)     Bladder infection     Bronchitis     Constipation     Cough     Diabetes mellitus (HCC)     Dizziness     Dysfunction of eustachian tube     Erectile dysfunction of non-organic origin     Fatigue     Glaucoma     Hiatal hernia     Hypertension     Imbalance     Leg muscle spasm     MS (multiple sclerosis) (HCC)     Nephrolithiasis     No natural teeth     Sinus pain     Spinal stenosis     Strain of thoracic region      Past Surgical History  Past Surgical History:   Procedure Laterality Date    APPENDECTOMY      BRAIN SURGERY      Coil placed in aneurysm    CYSTOSCOPY      CYSTOSCOPY      CYSTOSCOPY  06/11/2018    EYE SURGERY      transscleral cyclophotocoagulation noncontact YAG laser    MYRINGOTOMY      with ventilation tube insertion    SUPRAPUBIC CATHETER INSERTION             10/23/18 1043   Note Type   Note type Eval/Treat   Restrictions/Precautions   Weight Bearing Precautions Per Order No   Other Precautions Fall Risk;Pain;Multiple lines;Telemetry; Bed Alarm;Visual impairment   Pain Assessment   Pain Assessment No/denies pain   Pain Score No Pain   Home Living   Type of [address] [facility] One level;Stairs to enter with rails  (3 DAI)   Bathroom Shower/Tub Walk-in shower   Bathroom Toilet Standard   Bathroom Equipment Grab bars in shower; Shower chair;Grab bars around toilet   [facility]; Wheelchair-manual;Hospital bed;Grab bars  ([place] crutches)   Additional Comments prior to arrival pt reports has first floor setup at home; brother and sister able to assist him and home during the day to assist him; pt attends [employer] 2days/week for 4 hours a day and receives OT/PT   Prior Function   Level of [place] Independent with ADLs and functional mobility; Needs assistance with IADLs  ([place] crutches)   Lives With Family  (sister and brother)   Receives Help From Family   ADL Assistance Independent   IADLs Needs assistance   Falls in the last 6 months 0   Vocational Retired   Comments pt brother and sister transport him to appointments and sister completes IADL tasks; pt reports independent w/ ADLs, and independent w/ [address] approximately 80ft max; reports L side is weaker at baseline 2* MS; brother and sister assist w/ ascending and descending stairs   Lifestyle   Autonomy per pt independent w/ dressing and bathing, assist w/ IADLs from sister, independent functional transfers and mobility w/ [place] crutches, family transports to appointments   Reciprocal Relationships sister and brother   Service to Others retired on disability prior   [address] watching tv, watching Eagles   ADL   Where Assessed Edge of bed   Eating Assistance 4  Minimal Assistance  (increased time w/ use of R UE)   Grooming Assistance 3  Moderate Assistance   UB Bathing Assistance 3  Moderate Assistance   LB Bathing Assistance 2  Maximal Assistance   UB Dressing Assistance 3  Moderate Assistance   LB Dressing Assistance 2  Maximal Assistance   Toileting Assistance  2  Maximal Assistance   Bed Mobility   Rolling L 3  Moderate assistance   Additional items Assist x 1;Assist x 2; Increased time required; Bedrails;Verbal cues;LE management   Supine to Sit 2  Maximal assistance   Additional items Assist x 2; Increased time required;Verbal cues;LE management; Bedrails;HOB elevated   Sit to Supine 2  Maximal assistance   Additional items Assist x 2; Increased time required;Verbal cues;LE management   Additional Comments pt sat EOB x 10-15minutes, initially pt sat EOB w/ Poor sitting balance & posterior lean and improved for 3-5 minutes able to support self sitting upright w/ hands on lap, able to reach to touch therapists hand w/ b/l UEs   Transfers   Sit to Stand Unable to assess   Additional Comments pt not appropriate for functional transfers at this time due to impaired balance/decreased trunk control, hemiparesis on R side   Balance   Static Sitting Poor +  (3-5 minutes able to sit upright unsupported; initally Poor)   Dynamic Sitting Poor   Activity Tolerance   Activity Tolerance Patient limited by fatigue;Treatment limited secondary to medical complications (Comment)  (R hemiparesis)   Medical Staff Made Aware pt  from senior life present   Nurse Made Aware appropriate to see per Polly BETANCUR   RUE Assessment   RUE Assessment X  (gross grasp 2-/5, R hand dominant)   RUE Overall AROM   R Shoulder Flexion 60   R Shoulder ABduction 50   R Elbow Flexion 80   R Elbow Supination 25   R Elbow Pronation 25   R Wrist Extension approx 5   RUE Overall PROM   R Shoulder Flexion WFL   R Shoulder Extension [facility]   R Shoulder ABduction WFL   R Shoulder ADduction WFL   R Elbow Flexion WFL   R Elbow Extension WFL   R Elbow Supination WFL   R Elbow Pronation WFL   R Wrist Flexion WFL   R Wrist Extension WFL   RUE Strength   RUE Overall Strength Deficits   R Shoulder Flexion 2-/5   R Shoulder Extension 2-/5   R Shoulder ABduction 2-/5   R Elbow Flexion 2/5   R Elbow Extension 2/5   R Forearm Pronation 2/5   R Forearm Supination 2/5   R Wrist Flexion 2-/5   R Wrist Extension 2-/5   LUE Assessment   LUE Assessment WFL  (4/5)   Hand Function   Gross Motor Coordination Impaired  (R UE impaired, dysdiadochokinesia, )   Fine Motor Coordination Impaired  (R UE impaired, increased time finger to nose)   Sensation   Light Touch Partial deficits in the RUE;Severe deficits in the RLE; Severe deficits in the LLE   Additional Comments impaired sensation in b/l feet   Proprioception   Proprioception Severe deficits in the LLE; Severe deficits in the RLE   Vision-Basic Assessment   Current Vision (reports blurry vision w/ glaucoma)   Visual History Glaucoma   Vision - Complex Assessment   Ocular Range of Motion WFL   Head Position WDL   Tracking Able to track stimulus in all quads without difficulty   Acuity Able to read clock/calendar on wall without difficulty   Perception   Inattention/Neglect Appears intact   Cognition   Overall Cognitive Status [facility]   Arousal/Participation Alert; Cooperative   Attention Attends with cues to redirect   Orientation Level Oriented to person;Oriented to place;Oriented to situation;Oriented to time  (not specific date)   Memory Within functional limits;Decreased short term memory  (questionable STM)   Following Commands Follows one step commands with increased time or repetition   Comments pt w/ decreased insight into deficits, pt is motivated to participate  in session and wants to go to rehab and return to independence   Assessment   Limitation Decreased ADL status; Decreased Safe judgement during ADL;Decreased UE strength;Decreased cognition;Decreased endurance;Decreased self-care trans;Decreased high-level ADLs; Decreased sensation;Decreased UE ROM; Decreased fine motor control;Visual deficit  (R UE hemiparesis)   Prognosis Good   Assessment Pt is a 68 y o  male seen for OT evaluation s/p admit to [facility] on 10/21/2018 w/ R LE/UE weakness; CVA (cerebral vascular accident) ([facility] [address] )  MRI revealed: L posterior corona radiata CVA  Comorbidities affecting pt's functional performance at time of assessment include: DM II w/ diabetic neuropathy, multiple sclerosis, HLD, HTN, depression, neurogenic bladder  Personal factors affecting pt at time of IE include: decreased insight into deficits  Prior to admission, pt was living w/ brother and sister and reports independent w/ ADLs, independent w/ functional transfers and mobility w/ [place] crutches, assist for IADLs from sister and transportation from brother and sister  Upon evaluation: Pt requires MAX assist x2 supine>sit bed mobility, MAX assist LB ADLS, MOD assist UB ADLS, MAX assist toileting, MOD assist grooming/self-feeding 2* the following deficits impacting occupational performance: R UE hemiparesis (2-/2/5 MMT see above (R hand dominant at baseline), R UE impaired coordination w/ increased time to complete, R LE weakness, b/l LE impaired proprioception, impaired sitting balance (w/ posterior lean initially, able to sit unsupported 3-5 minutes w/ hands on lap), impaired trunk control, impaired activity tolerance, decreased endurance, impaired insight into deficits, impaired vision at baseline (glaucoma)  Pt not appropriate for OOB transfers due to impaired core strength and R hemiparesis, impaired sitting balance; OTR to assess next session w/ possible use of quick move  Pt to benefit from continued skilled OT tx while in the hospital to address deficits as defined above and maximize level of functional independence w ADL's and functional mobility  Occupational Performance areas to address include: eating, grooming, bathing/shower, toilet hygiene, dressing, functional mobility and clothing management, visual assessment, B/L UE exercises and coordination exercises   From OT standpoint, recommendation at time of d/c would be short term rehab, pt requires intensive rehab and is motivated to participate in session and regain independence  Goals   Patient Goals "to regain independence"   LTG Time Frame 10-14   Long Term Goal please see below goals   Plan   Treatment Interventions ADL retraining;Functional transfer training;UE strengthening/ROM; Endurance training;Cognitive reorientation;Patient/family training;Visual perceptual retraining;Neuromuscular reeducation;Equipment evaluation/education; Compensatory technique education; Energy conservation; Activityengagement; Fine motor coordination activities;Continued evaluation   Goal Expiration Date 11/06/18   Treatment Day 1   OT Frequency 3-5x/wk   Additional Treatment Session   Start Time 1114   End Time 1128   Treatment Assessment Pt seen for skilled OT session focused on UE exercises for R UE and patient education  Pt repositioned upright at end of session  Pt educated on use of wash cloth to perform hand squeezes and to perform flexion/extension on washcloth w/ hand on bedside table at 2 sets x 10 reps, pt demonstrated  Pt educated on AAROM exercises of flexion/extension of elbow w/ clasping of b/l hands at 2 sets x 10 reps and pt completed  Pt educated on use of R UE as much as possible for functional tasks and pt is receptive  Pt repositioned w/ R UE and hand extended on blanket support and educated to position hand extended on blanket; pt receptive  Pt continues to be limited due to R UE hemiparesis, impaired balance, impaired trunk control, Fair -activity tolerance, increased fatigue, weakness on L side from MS all causing a decline in ADLs, functional transfers and mobility  Pt is very motivated to participate in therapy and educated on techniques to complete to assist w/ enhancing performance of R UE  Recommend STR when medically stable  Will continue to follow to address OT goals     Additional Treatment Day 1   Recommendation   Recommendation Physiatry Consult   OT Discharge Recommendation Short Term Rehab   OT - OK to Discharge (to rehab when medically stable)   Barthel Index   Feeding 5   Bathing 0   Grooming Score 0   Dressing Score 5   Bladder Score 0   Bowels Score 10   Toilet Use Score 5   Transfers (Bed/Chair) Score 5   Mobility (Level Surface) Score 0   Stairs Score 0   Barthel Index Score 30   Modified Wilder Scale   Modified Amelia Scale 5      Occupational Therapy Goals to be met in 10-14 days:  1) Pt will improve activity tolerance to G for min 30 min txment sessions to enhance ADLs  2) Pt will complete UB ADLs/self care w/ supervision  3) Pt will complete LB ADLs w/ min assist  4) Pt will complete toileting w/ min assistI w/ G hygiene/thoroughness using DME PRN  5) OTR to assess functional transfers as appropriate  6) Pt will engage in ongoing cognitive assessment w/ G participation to A w/ safe d/c planning/recommendations  7) Pt will demonstrate G carryover of pt/caregiver education and training as appropriate w/ mod I  w/ G tolerance  8) Pt will engage in depression screen/leisure interest checklist w/ G participation to monitor s/s depression and ID 3 positive coping strategies to A w/ emotional regulation and management  9) Pt will demonstrate 100% carryover of E C  techniques w/ mod I t/o fx'l I/ADL/leisure tasks w/o cues s/p skilled education  10) Pt will tolerate bed mobility and EOB seated tasks w/ min A for 30 mins to engage in fx'l I/ADL/leisure tasks w/ min A w/ min cues  11) Pt will demonstrate improved R UE strength by 1 MMT grade and complete R UE ROM exercise program at MOD I and improved coordination speed to enhance ADLS and functional transfers  12) Pt will engage in ongoing  assessments, screens, and activities t/o functional tasks w/ good participation to assist w/ adaptation and accommodations or rule out visual perceptual impairments     Documentation completed by: Nacho Renee MS, OTR/L

## 2018-10-23 NOTE — PLAN OF CARE
Problem: SLP ADULT - SWALLOWING, IMPAIRED  Goal: Advance to least restrictive diet without signs or symptoms of aspiration for planned discharge setting  See evaluation for individualized goals         Outcome: Completed Date Met: 10/23/18

## 2018-10-23 NOTE — PLAN OF CARE

## 2018-10-23 NOTE — PLAN OF CARE
Problem: PHYSICAL THERAPY ADULT  Goal: Performs mobility at highest level of function for planned discharge setting  See evaluation for individualized goals  Treatment/Interventions: Functional transfer training, LE strengthening/ROM, Therapeutic exercise, Endurance training, Patient/family training, Equipment eval/education, Bed mobility, Compensatory technique education, Continued evaluation, Spoke to nursing, OT  Equipment Recommended:  (monitor)       See flowsheet documentation for full assessment, interventions and recommendations  Outcome: Progressing  Prognosis: Fair  Problem List: Decreased strength, Decreased endurance, Impaired balance, Decreased mobility, Decreased range of motion, Decreased coordination, Decreased safety awareness, Impaired sensation, Impaired tone  Assessment: Pt is 71 y/o male admitted with R sided weakness, + for CVA  Hx of MS with L sided weakness prior to admission  PT consulted  Prior to CVA was independent with mobility and ambulation with use of Thai crutches for distances of 80 ft  I with ADLS  Denies hx of falls  Resides in main floor living with 3 DAI with brother and sister and notes are supportive  Currently presents with significant functional impairments 2* decreased B/L LE R > LLE, impaired sensation, increased tone, decreased balance, activity tolerance and functional mobility  Requires max A of 2 for supine<>sit with posterior balance noted, initially requiring maxA to support at EOB  Over time working on positioning and balance activities able to maintain midline self supported x 3 minutes  Not appropriate to progress to standing at this time 2* decreased balance, trunk control, LE strength and tolerance to activity  Pt will benefit from ongoing PT and STR at d/c in order optimize outcomes  Barriers to Discharge: Inaccessible home environment  Barriers to Discharge Comments: 3 DAI  Recommendation: Short-term skilled PT     PT - OK to Discharge:  Yes (to rhb, NO to home)    See flowsheet documentation for full assessment

## 2018-10-23 NOTE — PHYSICAL THERAPY NOTE
PT EVALUATION ( 10;50-11;28)    70 y o     [id number]    Multiple sclerosis ([facility] ) [G35]  TIA (transient ischemic attack) [G45 9]  St[facility] ([facility] ) [I63 9]    Past Medical History:   Diagnosis Date    Acute laryngitis     Acute nonsuppurative otitis media, unspecified laterality     Arm weakness     Arthritis     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HCC)     Bladder infection     Bronchitis     Constipation     Cough     Diabetes mellitus (HCC)     Dizziness     Dysfunction of eustachian tube     Erectile dysfunction of non-organic origin     Fatigue     Glaucoma     Hiatal hernia     Hypertension     Imbalance     Leg muscle spasm     MS (multiple sclerosis) (HCC)     Nephrolithiasis     No natural teeth     Sinus pain     Spinal stenosis     Strain of thoracic region          Past Surgical History:   Procedure Laterality Date    APPENDECTOMY      BRAIN SURGERY      Coil placed in aneurysm    CYSTOSCOPY      CYSTOSCOPY      CYSTOSCOPY  06/11/2018    EYE SURGERY      transscleral cyclophotocoagulation noncontact YAG laser    MYRINGOTOMY      with ventilation tube insertion    SUPRAPUBIC CATHETER INSERTION        10/23/18 1128   Note Type   Note type Eval/Treat   Pain Assessment   Pain Assessment No/denies pain   Home Living   Type of [address] [place] [address] One level;Stairs to enter with rails  (3 DAI)   Bathroom Shower/Tub Walk-in shower   Bathroom Toilet Standard   Bathroom Equipment Grab bars in shower;Grab bars around toilet   [facility]; Wheelchair-manual;Hospital bed;Grab bars; Other (Comment)  (adelaftstrand/Japanese crutches )   Additional Comments first floor set up  Brother and sister available to assist prn  [facility] 2 days per week x 4 hours  Gets PT/OT   Prior Function   Level of [place] Independent with ADLs and functional mobility; Needs assistance with IADLs   Lives With [employer] Help From Family   ADL Assistance Independent   IADLs Needs assistance   Falls in the last 6 months 0   Vocational Retired   Comments siblings provide transportation, I PTA with Irish crutces for distances of approximately 80 ft   L side chronically weaker 2* MS  A for stair navigation and car transfers needed PTA  Restrictions/Precautions   Weight Bearing Precautions Per Order No   Braces or Orthoses (used to wear MAFOs, but no longer used PTA)   Other Precautions Fall Risk;Pain;Multiple lines; Bed Alarm;Visual impairment   General   Additional Pertinent History Pt is 69 y/o male admitted with R sided weakness  + CVA  Hx of MS with L sided weakness  PT consulted  Family/Caregiver Present No  ([facility] CM present 2nd half of section )   Cognition   Overall Cognitive Status WFL   Arousal/Participation Alert   Attention Attends with cues to redirect   Orientation Level Oriented X4  (not specific date)   Following Commands Follows one step commands with increased time or repetition   RUE Assessment   RUE Assessment X  (R hand dominant,  2-/5  Grossly 2/5)   LUE Assessment   LUE Assessment WFL   RLE Assessment   RLE Assessment X  (increased tone noted LE)   Strength RLE   R Hip Flexion 1/5   R Knee Extension 2+/5   R Ankle Dorsiflexion 0/5   R Ankle Plantar Flexion 1/5   Tone RLE   RLE Tone Hypertonic  (+ clonus)   LLE Assessment   LLE Assessment X   Strength LLE   L Hip Flexion 2-/5   L Knee Extension 3-/5   L Ankle Dorsiflexion 1/5   L Ankle Plantar Flexion 2-/5   Tone LLE   LLE Tone Hypertonic  (+ clonus)   Coordination   Movements are Fluid and Coordinated 0   Coordination and Movement Description increased LE tone and weakness  Light Touch   RLE Light Touch Absent   LLE Light Touch Absent   Proprioception   RLE Proprioception Absent   LLE Proprioception Absent   Bed Mobility   Rolling L 3  Moderate assistance   Additional items Assist x 1;Assist x 2; Increased time required;Verbal cues;LE management   Supine to Sit 2  Maximal assistance   Additional items Assist x 2; Increased time required;LE management;Verbal cues; Bedrails;HOB elevated  (retropulsive)   Sit to Supine 2  Maximal assistance   Additional items Assist x 2; Increased time required;Verbal cues;LE management   Additional Comments EOB x 15 minutes  3 minutes midline self supported with focus on balance training and postural positioning  Transfers   Sit to Stand Unable to assess  (not appropriate 2* poor balance, activity pamela, dec strength)   Ambulation/Elevation   Gait pattern Not appropriate   Balance   Static Sitting Poor +  (improved over training )   Dynamic Sitting Poor   Endurance Deficit   Endurance Deficit Yes   Endurance Deficit Description weakness, fatigue  Activity Tolerance   Activity Tolerance Patient limited by fatigue;Treatment limited secondary to medical complications (Comment)  (R hemiparesis, L weakness with MS hx)   Medical Staff Made Aware Nursing, OT   from [facility] present for second half of session  Nurse Made Aware ok angel luis Pereira for PT to see  Dr Virgie Portillo at bedside  Assessment   Prognosis Fair   Problem List Decreased strength;Decreased endurance; Impaired balance;Decreased mobility; Decreased range of motion;Decreased coordination;Decreased safety awareness; Impaired sensation; Impaired tone   Assessment Pt is 69 y/o male admitted with R sided weakness, + for CVA  Hx of MS with L sided weakness prior to admission  PT consulted  Prior to CVA was independent with mobility and ambulation with use of [place] crutches for distances of 80 ft  I with ADLS  Denies hx of falls  Resides in main floor living with 3 DAI with brother and sister and notes are supportive  Currently presents with significant functional impairments 2* decreased B/L LE R > LLE, impaired sensation, increased tone, decreased balance, activity tolerance and functional mobility    Requires max A of 2 for supine<>sit with posterior balance noted, initially requiring maxA to support at EOB  Over time working on positioning and balance activities able to maintain midline self supported x 3 minutes  Not appropriate to progress to standing at this time 2* decreased balance, trunk control, LE strength and tolerance to activity  Pt will benefit from ongoing PT and STR at d/c in order optimize outcomes  Barriers to Discharge Inaccessible home environment   Barriers to Discharge Comments 3 DAI   Goals   Patient Goals to regain my independence  STG Expiration Date 11/02/18   Short Term Goal #1 10 days: 1)  Pt will perform bed mobility with Thuy demonstrating appropriate technique 100% of the time in order to improve function and lessen caregiver assistance 2) Improve overall strength and balance 1/2 grade in order to optimize ability to perform functional tasks and reduce fall risk  3) Increase activity tolerance to 45 minutes in order to improve endurance to functional tasks  4) PT for ongoing patient and family/caregiver education, DME needs and d/c planning in order to promote highest level of function in least restrictive environment  5) Improve EOB sitting tolerance 20 minutes  6) Assess transfers and revise functional goals  Treatment Day 0   Plan   Treatment/Interventions Functional transfer training;LE strengthening/ROM; Therapeutic exercise; Endurance training;Patient/family training;Equipment eval/education; Bed mobility; Compensatory technique education;Continued evaluation;Spoke to nursing;OT   PT Frequency Other (Comment)  (4-7x/wk)   Recommendation   Recommendation Short-term skilled PT   Equipment Recommended (monitor)   PT - OK to Discharge Yes  (to rhb, NO to home)   Modified Eugene Scale   Modified Wilder Scale 5   Barthel Index   Feeding 5   Bathing 0   Grooming Score 0   Dressing Score 5   Bladder Score 0   Bowels Score 10   Toilet Use Score 5   Transfers (Bed/Chair) Score 5   Mobility (Level Surface) Score 0   Stairs Score 0   Barthel Index Score 30 History: co - morbidities, fall risk, use of assistive device, assist for adl's,  multiple lines, DAI  Exam: impairments in locomotion, musculoskeletal, balance,posture, tone, coordination, sensation, barthel 30  Clinical: unstable/unpredictable  Complexity:high      Beverley Phoenix, PT

## 2018-10-24 PROCEDURE — 97530 THERAPEUTIC ACTIVITIES: CPT

## 2018-10-24 PROCEDURE — 97110 THERAPEUTIC EXERCISES: CPT

## 2018-10-24 PROCEDURE — 97116 GAIT TRAINING THERAPY: CPT

## 2018-10-24 PROCEDURE — 97535 SELF CARE MNGMENT TRAINING: CPT

## 2018-10-24 PROCEDURE — 99232 SBSQ HOSP IP/OBS MODERATE 35: CPT | Performed by: INTERNAL MEDICINE

## 2018-10-24 RX ORDER — SIMETHICONE 80 MG
80 TABLET,CHEWABLE ORAL EVERY 6 HOURS PRN
Status: DISCONTINUED | OUTPATIENT
Start: 2018-10-24 | End: 2018-10-26 | Stop reason: HOSPADM

## 2018-10-24 RX ORDER — BISACODYL 10 MG
10 SUPPOSITORY, RECTAL RECTAL DAILY PRN
Status: DISCONTINUED | OUTPATIENT
Start: 2018-10-24 | End: 2018-10-26 | Stop reason: HOSPADM

## 2018-10-24 RX ORDER — LORAZEPAM 0.5 MG/1
0.5 TABLET ORAL EVERY 8 HOURS PRN
Status: DISCONTINUED | OUTPATIENT
Start: 2018-10-24 | End: 2018-10-24

## 2018-10-24 RX ORDER — ALPRAZOLAM 0.25 MG/1
0.25 TABLET ORAL 3 TIMES DAILY PRN
Status: DISCONTINUED | OUTPATIENT
Start: 2018-10-24 | End: 2018-10-26 | Stop reason: HOSPADM

## 2018-10-24 RX ADMIN — METFORMIN HYDROCHLORIDE 500 MG: 500 TABLET, FILM COATED ORAL at 16:23

## 2018-10-24 RX ADMIN — ATORVASTATIN CALCIUM 80 MG: 80 TABLET, FILM COATED ORAL at 21:55

## 2018-10-24 RX ADMIN — ASPIRIN 324 MG: 81 TABLET, COATED ORAL at 08:54

## 2018-10-24 RX ADMIN — AMLODIPINE BESYLATE 10 MG: 10 TABLET ORAL at 08:54

## 2018-10-24 RX ADMIN — SUCRALFATE 1 G: 1 TABLET ORAL at 18:14

## 2018-10-24 RX ADMIN — SUCRALFATE 1 G: 1 TABLET ORAL at 08:53

## 2018-10-24 RX ADMIN — DOCUSATE SODIUM 100 MG: 100 CAPSULE, LIQUID FILLED ORAL at 08:54

## 2018-10-24 RX ADMIN — CLOPIDOGREL 75 MG: 75 TABLET, FILM COATED ORAL at 08:53

## 2018-10-24 RX ADMIN — GABAPENTIN 300 MG: 300 CAPSULE ORAL at 16:23

## 2018-10-24 RX ADMIN — HEPARIN SODIUM 5000 UNITS: 5000 INJECTION INTRAVENOUS; SUBCUTANEOUS at 06:02

## 2018-10-24 RX ADMIN — BACLOFEN 5 MG: 10 TABLET ORAL at 18:14

## 2018-10-24 RX ADMIN — ALPRAZOLAM 0.25 MG: 0.25 TABLET ORAL at 21:55

## 2018-10-24 RX ADMIN — LOSARTAN POTASSIUM 50 MG: 50 TABLET ORAL at 08:53

## 2018-10-24 RX ADMIN — PANTOPRAZOLE SODIUM 40 MG: 40 TABLET, DELAYED RELEASE ORAL at 16:23

## 2018-10-24 RX ADMIN — PANTOPRAZOLE SODIUM 40 MG: 40 TABLET, DELAYED RELEASE ORAL at 06:01

## 2018-10-24 RX ADMIN — DOCUSATE SODIUM 100 MG: 100 CAPSULE, LIQUID FILLED ORAL at 18:14

## 2018-10-24 RX ADMIN — GABAPENTIN 300 MG: 300 CAPSULE ORAL at 08:54

## 2018-10-24 RX ADMIN — GABAPENTIN 300 MG: 300 CAPSULE ORAL at 21:55

## 2018-10-24 RX ADMIN — HEPARIN SODIUM 5000 UNITS: 5000 INJECTION INTRAVENOUS; SUBCUTANEOUS at 13:08

## 2018-10-24 RX ADMIN — HEPARIN SODIUM 5000 UNITS: 5000 INJECTION INTRAVENOUS; SUBCUTANEOUS at 21:55

## 2018-10-24 RX ADMIN — SIMETHICONE CHEW TAB 80 MG 80 MG: 80 TABLET ORAL at 12:55

## 2018-10-24 RX ADMIN — BACLOFEN 5 MG: 10 TABLET ORAL at 08:54

## 2018-10-24 RX ADMIN — METFORMIN HYDROCHLORIDE 500 MG: 500 TABLET, FILM COATED ORAL at 08:54

## 2018-10-24 RX ADMIN — FUROSEMIDE 20 MG: 20 TABLET ORAL at 08:54

## 2018-10-24 RX ADMIN — PROPRANOLOL HYDROCHLORIDE 60 MG: 60 CAPSULE, EXTENDED RELEASE ORAL at 08:53

## 2018-10-24 RX ADMIN — MELATONIN TAB 3 MG 6 MG: 3 TAB at 21:55

## 2018-10-24 RX ADMIN — POTASSIUM CHLORIDE 10 MEQ: 750 TABLET, EXTENDED RELEASE ORAL at 08:58

## 2018-10-24 RX ADMIN — FUROSEMIDE 20 MG: 20 TABLET ORAL at 18:14

## 2018-10-24 NOTE — PHYSICAL THERAPY NOTE
PHYSICAL THERAPY NOTE          Patient Name: Sonam Corona  [id number] Date: 10/24/2018     10/24/18 3308   Pain Assessment   Pain Assessment No/denies pain   Restrictions/Precautions   Weight Bearing Precautions Per Order No   Other Precautions Contact/isolation;Multiple lines; Fall Risk;Visual impairment   General   Chart Reviewed Yes   Response to Previous Treatment Patient with no complaints from previous session  Family/Caregiver Present Yes   Cognition   Overall Cognitive Status WFL   Arousal/Participation Alert   Attention Attends with cues to redirect   Orientation Level Oriented to person;Oriented to place;Oriented to time   Memory Within functional limits   Following Commands Follows one step commands with increased time or repetition   Subjective   Subjective pt willing to participate in PT treatment session    Bed Mobility   Supine to Sit 3  Moderate assistance   Additional items Assist x 2; Increased time required;Verbal cues   Sit to Supine 3  Moderate assistance   Additional items Assist x 2; Increased time required;Verbal cues   Transfers   Sit to Stand 2  Maximal assistance   Additional items Assist x 2; Increased time required;Verbal cues   Stand to Sit 2  Maximal assistance   Additional items Assist x 2; Increased time required;Verbal cues   Additional Comments VC needed for hand placement and safety   (quick move utilized for OOB mobility )   Ambulation/Elevation   Gait pattern Excessively slow; Foward flexed; Inconsistent toño; Short stride;Decreased foot clearance; Improper Weight shift   Gait Assistance 2  Maximal assist   Additional items Assist x 2   Assistive Device Rolling walker   Distance 2 lateral steps toward [facility]   Balance   Static Sitting Poor +   Dynamic Sitting Poor   Static Standing Poor -   Dynamic Standing Poor -   Ambulatory Poor -   Endurance Deficit   Endurance Deficit Yes   Endurance Deficit Description fatigue, weakness    Activity Tolerance   Activity Tolerance Patient limited by fatigue   Nurse Made Aware Pt appropriate to be seen and mobilize per Shahid Wells RN   Exercises   TKR Sitting;10 reps;AAROM; Bilateral  (x 2 sets )   Assessment   Prognosis Fair   Problem List Decreased strength;Decreased endurance; Impaired balance;Decreased mobility; Decreased range of motion;Decreased coordination;Decreased safety awareness; Impaired tone; Impaired sensation   Assessment Pt resting in bed at time of PT treatment session  Pt reports feeling " better" today and is willing to participate in PT treatment session  Pt able to perform all bed mobility with mod A x 2 which is improved compared to previous session, however A still required for LE management and weight shifts  Pt able to perform all transfers with max A x 2 this session which is also improved compared to previous session  VC and TC required for hand placement and safety as well as physical assistance to facilitate proper weight shifts with transfers  Pt able to tolerate standing x 1 min and was able to tolerate 2 lateral steps toward [facility] with max A x2  Additional ambulation unable to be attempted at this time secondary to fatigue and LE weakness  Pt noted to have decreased foot clearance and improper weight shifts with ambulation  Pt utilized quick move for OOB mobility to chair this session due to decreased standing tolerance and ambulation tolerance  Pt continues to require increased time to complete all mobility and therex due to fatigue and LE weakness  Pt able to tolerate and perform all therex seated OOB in chair this session and denies any new complaints  Pt continues to require assist to perform therex through full available ROM  Pt continues to demonstrate progress with PT and will continue to benefit from skilled PT during hospital stay  Pt assisted back into chair at conclusion of PT session with all needs within reach   Pt and pts family deny any further questions at this time  PT will continue to follow  D/C recommendation when medically cleared is rehab  Barriers to Discharge Inaccessible home environment   Barriers to Discharge Comments 3 DAI    Goals   Patient Goals " to get better and stronger"   STG Expiration Date 11/02/18   Short Term Goal #1 In 10 days pt will complete: 1) Bed mobility skills with min A to increase safety and independence as well as decrease caregiver burden  2) Functional transfers with min A to promote increased independence, safety, and QOL in the home environment  3) Ambulate 25' using least restrictive AD with min A without LOB and stable vitals so that pt can negotiate home environment safely and promote independence with functional mobility and return to PLOF  Horace Ingles 4) Improve balance grades to Good to increase safety with all mobility and decrease fall risk  5) Improve BLE strength by 1/2 grade to help increase overall functional mobility and decrease fall risk  6) PT for ongoing pt and family education; DME needs and D/C planning to promote highest level of function in least restrictive environment  Treatment Day 1   Plan   Treatment/Interventions Functional transfer training;LE strengthening/ROM; Therapeutic exercise; Endurance training;Patient/family training;Equipment eval/education; Bed mobility;Gait training;Spoke to nursing;OT;Family   Progress Progressing toward goals   PT Frequency Other (Comment)  (4-7x a week )   Recommendation   Recommendation Short-term skilled PT   Equipment Recommended Walker; Wheelchair   PT - OK to Discharge Yes  (to rehab when medically cleared )   Kasie Stone, PT

## 2018-10-24 NOTE — PLAN OF CARE
Problem: OCCUPATIONAL THERAPY ADULT  Goal: Performs self-care activities at highest level of function for planned discharge setting  See evaluation for individualized goals  Treatment Interventions: ADL retraining, Functional transfer training, UE strengthening/ROM, Endurance training, Cognitive reorientation, Patient/family training, Visual perceptual retraining, Neuromuscular reeducation, Equipment evaluation/education, Compensatory technique education, Energy conservation, Activityengagement, Fine motor coordination activities, Continued evaluation          See flowsheet documentation for full assessment, interventions and recommendations  Outcome: Progressing  Limitation: Decreased ADL status, Decreased Safe judgement during ADL, Decreased UE strength, Decreased cognition, Decreased endurance, Decreased self-care trans, Decreased high-level ADLs, Decreased sensation, Decreased UE ROM, Decreased fine motor control, Visual deficit (R UE hemiparesis)  Prognosis: Good  Assessment: Pt seen for a skilled OT session focused on R UE strength and ROM, EOB sitting tolerance, functional transfers and mobility, gross and fine motor coordination, grooming  Pt seated upright in bed upon arrival  Pt while seated upright in bed performed UE there ex seen above to increase strength and endurance for ADLs and functional transfers  Pt noted w/ increased AROM in R UE shoulder flexion (90 degrees), abduction (90 degrees), elbow flexion [facility]), grasp (3+/5) since initial evaluation  Pt completed MOD assist x2 supine>sit bed mobility w/ use of bed rails and LE management  While seated EOB, pt completed functional task w/ R UE, pt w/ increased difficulty to grasp small objects  Pt w/ noted difficulty during self-feeding to grasp cup and transition to mouth, pt w/ FAIR/FAIR- sitting balance for activity for a total of 15 minutes EOB sitting tolerance   Pt w/ L lateral-posterior lean noted at times w/ cues to redirect to upright posture  Pt completed MAX assist x2 sit>stand transfer w/ VC for hand placement  Pt w/ MAX assist x2 functional mobility w/ RW side step to HOB, MAX assist x2 stand>sit transfer to EOB w/ VC for hand placement and controlled descent  Pt while seated EOB completed fine motor table top card activity w/ R UE and decreased balance noted w/ functional reach, increased time to grasp cards  Pt completed MAX assist x2 sit>stand transfer to Quick Move w/ assist x1 to support pt while moving Quick Move to bedside recliner  While seated bedside recliner, Pt completed R UE AAROM shoulder abduction with end range stretching  Pt w/ MIN assist grooming to wash face w/ difficulty holding on to wash cloth and decreased controlled movements while washing face  Pt seated bedside recliner at end of session w/ call bell and phone within reach and all needs met  Pt continues to be limited by R UE hemiparesis, visual impairments, decreased trunk control, decreased sitting balance w L lateral-posterior lean noted and increased cues to redirect, decreased strength and endurance, decreased activity tolerance, decreased R UE functional reach and coordination all impacting participation with ADLS, IADLS, functional mobility and transfers  Pt educated to continue to actively use R UE and complete ROM and strengthening exercises, pt receptive  Will continue to follow pt throughout stay at hospital to address OT POC  Recommend STR when medically stable, pt is motivated to participate in therapy  Pt goal is to go to rehab to gain independence and go home    Recommendation: Physiatry Consult  OT Discharge Recommendation: Short Term Rehab  OT - OK to Discharge:  (STR when medically stable)      Comments: Dale Ortiz, OTS

## 2018-10-24 NOTE — OCCUPATIONAL THERAPY NOTE
[address] Progress Note     Patient Name: Erickson Salgado  CGGBW'L Date: 10/24/2018  Problem List  Patient Active Problem List   Diagnosis    Urinary tract infection    Cellulitis    Diabetic neuropathy ([place] [address] )    Depression    Cervical spinal stenosis    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HCC)    Benign colon polyp    Benign prostatic hyperplasia    Chronic suprapubic catheter ([place] [address] )    Dyslipidemia    Esophageal reflux    Fatty liver    Generalized anxiety disorder    Glaucoma    Hyperlipidemia    Hypertension    Multiple sclerosis ([place] [address] )    Neurogenic bladder    Obstructive sleep apnea    Thyroid nodule    Type 2 diabetes mellitus ([place] [address] )    Urinary retention    CVA (cerebral vascular accident) ([place] [address] )         10/24/18 4314   Restrictions/Precautions   Other Precautions Contact/isolation;Multiple lines; Fall Risk;Visual impairment   Pain Assessment   Pain Assessment No/denies pain   Pain Score No Pain   ADL   Where Assessed Edge of bed   Eating Assistance 4  Minimal Assistance   Eating Deficit Increased time to complete; Beverage management;Setup  (w/ use of R UE)   Eating Comments Pt w/ decreased strength noted   Grooming Assistance 4  Minimal Assistance   Grooming Deficit Wash/dry face; Increased time to complete;Setup  (w/ use of R UE)   Functional Standing Tolerance   Time 1 minute   Activity weight shifting w/ RW   Comments MOD assist x2 weightshifting w/ RW w/ cues for upright posture   Bed Mobility   Supine to Sit 3  Moderate assistance   Additional items Assist x 2;HOB elevated; Bedrails; Increased time required;LE management   Additional Comments pt w/ L lateral posterior lean noted while seated EOB w/ increased cues to redirect to upright posture, when R UE positioned on bedrail or chair pt w/ increased upright posture noted   Transfers   Sit to Stand 2  Maximal assistance   Additional items Assist x 2;Armrests; Increased time required;Verbal cues   Stand to Sit 2  Maximal assistance Additional items Assist x 2;Armrests; Increased time required;Verbal cues   Mechanical lift 2  Maximal assistance   Additional Comments MAX assist x2 w/ use or RW, MAX assist x2 w/ use of Quick Stand   Functional Mobility   Functional Mobility 2  Maximal assistance   Additional Comments MAX assist x2 side step HOB w/ assistance to maneuver RW   Additional items Rolling walker   Therapeutic Exercise - ROM   UE-ROM Yes   ROM- Right Upper Extremities   R Shoulder AAROM; Flexion; Extension;ABduction;Prolonged stretch   R Elbow AROM;Elbow flexion;Elbow extension   R Wrist AROM; Wrist flexion;Wrist extension   R Weight/Reps/Sets 2 sets x 15 reps   RUE ROM Comment Pt w/ increased AROM shoulder flexion, elbow flexion, wrist flexion,  since initial eval   Therapeutic Excerise-Strength   UE Strength No   Coordination   Gross Motor MIN assist x1 self-feeding activity w/ increased time to  and bring cup to mouth   Fine Motor able grasp small objects for functional reach activity w/ support at elbow   Cognition   Overall Cognitive Status [facility]   Arousal/Participation Alert; Cooperative   Attention Attends with cues to redirect   Orientation Level Oriented X4  (not oriented to specific day of week)   Memory Within functional limits   Following Commands Follows one step commands with increased time or repetition   Comments pt engaged in appropriate conversation and was motivated t/o session   Additional Activities   Additional Activities (table top card activity)   Additional Activities Comments Pt completed EOB table top card activity focused on fine motor coordination, functional reach, and cognitive assessment   Pt w/ FAIR sitting balance for 2 minutes and LOB noted w/ reaching during activity    Activity Tolerance   Activity Tolerance Patient limited by fatigue  (R UE hemiparesis)   Medical Staff Made Aware appropriate to see per RN, ARENDAL   Assessment   Assessment Pt seen for a skilled OT session focused on R UE strength and ROM, EOB sitting tolerance, functional transfers and mobility, gross and fine motor coordination, grooming  Pt seated upright in bed upon arrival  Pt while seated upright in bed performed UE there ex seen above to increase strength and endurance for ADLs and functional transfers  Pt noted w/ increased AROM in R UE shoulder flexion (90 degrees), abduction (90 degrees), elbow flexion [facility]), grasp (3+/5) since initial evaluation  Pt completed MOD assist x2 supine>sit bed mobility w/ use of bed rails and LE management  While seated EOB, pt completed functional task w/ R UE, pt w/ increased difficulty to grasp small objects  Pt w/ noted difficulty during self-feeding to grasp cup and transition to mouth, pt w/ FAIR/FAIR- sitting balance for activity for a total of 15 minutes EOB sitting tolerance  Pt w/ L lateral-posterior lean noted at times w/ cues to redirect to upright posture  Pt completed MAX assist x2 sit>stand transfer w/ VC for hand placement  Pt w/ MAX assist x2 functional mobility w/ RW side step to HOB, MAX assist x2 stand>sit transfer to EOB w/ VC for hand placement and controlled descent  Pt while seated EOB completed fine motor table top card activity w/ R UE and decreased balance noted w/ functional reach, increased time to grasp cards  Pt completed MAX assist x2 sit>stand transfer to Quick Move w/ assist x1 to support pt while moving Quick Move to bedside recliner  While seated bedside recliner, Pt completed R UE AAROM shoulder abduction with end range stretching  Pt w/ MIN assist grooming to wash face w/ difficulty holding on to wash cloth and decreased controlled movements while washing face  Pt seated bedside recliner at end of session w/ call bell and phone within reach and all needs met   Pt continues to be limited by R UE hemiparesis, visual impairments, decreased trunk control, decreased sitting balance w L lateral-posterior lean noted and increased cues to redirect, decreased strength and endurance, decreased activity tolerance, decreased R UE functional reach and coordination all impacting participation with ADLS, IADLS, functional mobility and transfers  Pt educated to continue to actively use R UE and complete ROM and strengthening exercises, pt receptive  Will continue to follow pt throughout stay at hospital to address OT POC  Recommend STR when medically stable, pt is motivated to participate in therapy  Pt goal is to go to rehab to gain independence and go home  Plan   Treatment Interventions ADL retraining;Functional transfer training;UE strengthening/ROM; Endurance training;Cognitive reorientation;Patient/family training;Equipment evaluation/education; Fine motor coordination activities; Energy conservation; Activityengagement   Goal Expiration Date 11/06/18   Treatment Day 2   OT Frequency 3-5x/wk   Recommendation   OT Discharge Recommendation Short Term Rehab   OT - OK to Discharge (STR when medically stable)   Barthel Index   Feeding 5   Bathing 0   Grooming Score 0   Dressing Score 5   Bladder Score 0   Bowels Score 10   Toilet Use Score 5   Transfers (Bed/Chair) Score 5   Mobility (Level Surface) Score 0   Stairs Score 0   Barthel Index Score 30   Modified [place] Scale   Modified [place] Scale 4     Grisel Orellana, OTS

## 2018-10-24 NOTE — PLAN OF CARE
Activity Intolerance/Impaired Mobility     Mobility/activity is maintained at optimum level for patient [place]     Discharge to home or other facility with appropriate resources Progressing        DISCHARGE PLANNING - CARE MANAGEMENT     Discharge to post-acute care or home with appropriate resources Progressing        INFECTION - ADULT     Absence or prevention of progression during hospitalization Progressing        Knowledge Deficit     Patient/family/caregiver demonstrates understanding of disease process, treatment plan, medications, and discharge instructions Progressing        Neurological Deficit     Neurological status is stable or improving Progressing        NEUROSENSORY - ADULT     Achieves maximal functionality and self care Progressing        Nutrition     Nutrition/Hydration status is improving Progressing        PAIN - ADULT     Verbalizes/displays adequate comfort level or baseline comfort level Progressing        Potential for Aspiration     Non-ventilated patient's risk of aspiration is minimized Progressing        Potential for Falls     Patient will remain free of falls Progressing        Prexisting or High Potential for Compromised Skin Integrity     Skin integrity is maintained or improved Progressing        SAFETY ADULT     Maintain or return to baseline ADL function Progressing     Maintain or return mobility status to optimal level Progressing        SKIN/TISSUE INTEGRITY - ADULT     Skin integrity remains intact Progressing

## 2018-10-24 NOTE — PROGRESS NOTES
[facility] Progress Note  Patient: Ynes Gandara 70 y o  male   MRN: [id number]  PCP: Kezia Hood DO  Unit/Bed#: E4 -01 Encounter: [id number]  Date Of Visit: 10/24/18    Assessment:    Principal Problem:    CVA (cerebral vascular accident) [facility])  Active Problems:    Diabetic neuropathy ([facility] )    Depression    Benign prostatic hyperplasia    Chronic suprapubic catheter ([place] [id number] )    Dyslipidemia    Hyperlipidemia    Hypertension    Multiple sclerosis ([place] [address] )    Neurogenic bladder    Obstructive sleep apnea    Type 2 diabetes mellitus ([place] [address] )      Plan:    · CVA, right upper and lower extremity weakness new in patient with known history of multiple sclerosis but MRI showing single restricted diffusion area in left corona radiata consistent with small vessel lacunar infarct which is acute aspirin discontinued in favor of Plavix 75 mg p o  Daily atorvastatin increased from 20 to 80 mg daily, 2D echocardiogram pending CTA was unremarkable for hemodynamic significant stenosis API study showed therapeutic response had been on 325 aspirin as outpatient  · Right-sided hay paresis new from recent stroke which had been his dominant side in relation to his history of multiple sclerosis with neurogenic bladder and suprapubic catheter also per PT evaluation he has significantly increased risk of falls and weakness as result of acute CVA and will require acute rehab await final evaluation PT/OT  · Type 2 diabetes with neuropathy continues on metformin here 500 mg b i d   And sliding scale coverage  · Multiple sclerosis suprapubic catheter and baclofen 4 spasticity, gabapentin for neuropathy  · Hyperlipidemia  and may setting of acute CVA warrants maximal statin drug therapy at 80 mg and atorvastatin  · UTI with more than 100,000 gram-negative rods growing in urine however patient has suprapubic catheter culture results grew Morganella and Providencia multi drug-resistant would not treat as patient is not septic presume to be colonized  · Hypertension continue losartan and furosemide 20 mg b i d       VTE Pharmacologic Prophylaxis:   Pharmacologic: Heparin  Mechanical VTE Prophylaxis in Place: Yes    Discussions with Specialists or Other Care Team Provider:  Now    Time Spent for Care: 45 minutes  More than 50% of total time spent on counseling and coordination of care as described above  Subjective:   Pleasant remains quite weak and needs 2 person assist just to obtain upright status in bed without falling off acknowledges the need for acute rehab in  he works with as outpatient present in room and states [place] [facility] [place] would like to go to [facility] but not available to him  Objective:     Vitals:   Temp (24hrs), Av 2 °F (36 8 °C), Min:98 °F (36 7 °C), Max:98 5 °F (36 9 °C)    Temp:  [98 °F (36 7 °C)-98 5 °F (36 9 °C)] 98 5 °F (36 9 °C)  HR:  [77-92] 92  Resp:  [18-20] 20  BP: (123-143)/(57-70) 131/70  SpO2:  [95 %-97 %] 97 %  Body mass index is 31 kg/m²  Input and Output Summary (last 24 hours):        Intake/Output Summary (Last 24 hours) at 10/24/18 1412  Last data filed at 10/24/18 0900   Gross per 24 hour   Intake               25 ml   Output             1350 ml   Net            -1325 ml       Physical Exam:     Physical Exam:   General appearance: alert, appears stated age and cooperative  Head: Normocephalic, without obvious abnormality, atraumatic  Lungs: clear to auscultation bilaterally  Heart: regular rate and rhythm  Abdomen: soft, non-tender; bowel sounds normal; no masses,  no organomegaly  Back: negative  Extremities: 2/5 strength right upper extremity summoned improvement in right lower extremity to 3 to 4 out of 5 strength very unsteady gait and needs 2 person assist  Neurologic: Motor: grade 2 biceps on the right      Additional Data:     Labs:      Results from last 7 days  Lab Units 10/22/18  0545   WBC Thousand/uL 11 76*   HEMOGLOBIN g/dL 14 4   HEMATOCRIT % 42 6   PLATELETS Thousands/uL 339       Results from last 7 days  Lab Units 10/22/18  0545 10/21/18  2118   SODIUM mmol/L 133*  --    POTASSIUM mmol/L 3 6  --    CHLORIDE mmol/L 99*  --    CO2 mmol/L 24  --    BUN mg/dL 7  --    CREATININE mg/dL 0 85  --    CALCIUM mg/dL 9 3  --    ALK PHOS U/L 85  --    ALT U/L 43  --    AST U/L 18  --    GLUCOSE, ISTAT mg/dl  --  190*       Results from last 7 days  Lab Units 10/21/18  2114   INR  0 90       * I Have Reviewed All Lab Data Listed Above  * Additional Pertinent Lab Tests Reviewed: All Labs For Current Hospital Admission Reviewed    Imaging:  X-ray Chest 1 View Portable    Result Date: 10/22/2018  Narrative: CHEST INDICATION:   stroke  COMPARISON:  Chest radiographs February 18, 2015 EXAM PERFORMED/VIEWS:  XR CHEST PORTABLE  AP semierect portable FINDINGS: Heart shadow appears unremarkable  Atherosclerotic vascular calcifications are noted  The lungs are clear  No pneumothorax or pleural effusion  Osseous structures appear within normal limits for patient age  Degenerative changes bilateral shoulders  Impression: No acute cardiopulmonary disease  Workstation performed: [id number]     Ct Stroke Alert Brain    Result Date: 10/21/2018  Narrative: CT BRAIN - STROKE ALERT PROTOCOL INDICATION:   Stroke  COMPARISON:  CT of the head on March 8, 2017  TECHNIQUE:  CT examination of the brain was performed  In addition to axial images, coronal reformatted images were created and submitted for interpretation  Radiation dose length product (DLP) for this visit:  1075 mGy-cm   This examination, like all CT scans performed in the [facility], was performed utilizing techniques to minimize radiation dose exposure, including the use of iterative reconstruction and automated exposure control  IMAGE QUALITY:  Diagnostic   FINDINGS:  PARENCHYMA:  Decreased attenuation is noted in the supratentorial white matter demonstrating an appearance most consistent with mild microangiopathic change  No intracranial mass, mass effect or midline shift  No acute intracranial hemorrhage  No CT signs of acute infarction  Aneurysm coil is seen within the basilar tip region similar to prior exam  VENTRICLES AND EXTRA-AXIAL SPACES:  The ventricles are stable in size and configuration  VISUALIZED ORBITS AND PARANASAL SINUSES:  Unremarkable  CALVARIUM AND EXTRACRANIAL SOFT TISSUES:   Normal      Impression: 1  No acute intracranial hemorrhage  2   Chronic small vessel ischemic changes  3   Stable basilar tip region aneurysm coil  4   If there is continued clinical concern for acute infarct, further evaluation with MRI may be obtained which is more sensitive  Findings were directly discussed with Alvarado Jacques on 10/21/2018 9:18 PM  Workstation performed: [id number]     Mri Brain Ms Wo And W Contrast    Result Date: 10/22/2018  Narrative: MRI BRAIN WITH AND WITHOUT CONTRAST INDICATION:  as per neurology recommendations  Demyelinating disease  COMPARISON:  Previous study from September 19, 2016, December 11, 2014 TECHNIQUE:  Sagittal T1, axial T2, axial FLAIR, axial T1  Axial diffusion-weighted imaging  Axial [place], Sagittal FLAIR CUBE  Axial [id number]  Sagittal BRAVO post contrast  IV Contrast:  8 mL of gadobutrol injection (MULTI-DOSE) IMAGE QUALITY:  Diagnostic  FINDINGS: BRAIN PARENCHYMA: There is a focal area of foot diffusion restriction in the left deep periventricular region involving the posterior aspect of the corona radiate  This is T2 hyperintense and hypointense on the FLAIR sequences  There is no enhancement noted on the post contrast images  Moderate to periventricular and white matter T2 hyperintensities are noted  These are unchanged as the previous study    There is no T2 hyperintense lesion seen within the infratentorial compartment, cerebellar peduncles and in the visualized the cervical cord  Postcontrast imaging of the brain demonstrates no abnormal enhancement  There is no discrete mass, mass effect or midline shift  There is no intracranial hemorrhage  There is no evidence of acute infarction  VENTRICLES:  Normal  SELLA AND PITUITARY GLAND:  Normal  ORBITS:  Normal  PARANASAL SINUSES:  Normal  VASCULATURE:  Evaluation of the major intracranial vasculature demonstrates appropriate flow voids  CALVARIUM AND SKULL BASE:  Normal  EXTRACRANIAL SOFT TISSUES:  Normal      Impression: There is a 1 2 cm focal area of diffusion restriction adjacent to the posterior body of the left lateral ventricle in the region of the posterior corona radiata without associated contrast enhancement compatible with acute lacunar infarct Moderate periventricular and white matter T2 hyperintensities noted, unchanged from the previous study of September 19, 2016 Nonenhancing focus to suggest active demyelination No acute hemorrhage seen  I personally discussed this study with Siri Elizabeth on 10/22/2018 at 12:20 PM   Workstation performed: [id number]     Cta Stroke Alert (head/neck)    Result Date: 10/21/2018  Narrative: CTA NECK AND BRAIN WITH CONTRAST INDICATION: Focal neuro deficit, new, fixed or worsening, <6 hours COMPARISON:   MRA of the head on 11/24/2017  CT of the head on March 8, 2017  TECHNIQUE: Post contrast imaging was performed after administration of iodinated contrast through the neck and brain  Post contrast axial 0 625 mm images timed to opacify the arterial system  3D rendering was performed on an independent workstation  MIP reconstructions performed  Coronal reconstructions were performed of the noncontrast portion of the brain  Radiation dose length product (DLP) for this visit:  694 mGy-cm     This examination, like all CT scans performed in the [facility], was performed utilizing techniques to minimize radiation dose exposure, including the use of iterative reconstruction and automated exposure control  IV Contrast:  90 mL of iohexol (OMNIPAQUE)  IMAGE QUALITY:   Diagnostic FINDINGS: CERVICAL VASCULATURE AORTIC ARCH AND GREAT VESSELS:  Moderate ahteroschlerotic disease of the arch and great vessels  Mild narrowing at the origin of the left common carotid artery  RIGHT VERTEBRAL ARTERY CERVICAL SEGMENT:  Mild narrowing at the origin  The vessel is normal in caliber throughout the neck  Scattered atherosclerotic calcifications  LEFT VERTEBRAL ARTERY CERVICAL SEGMENT:  Mild narrowing just distal to the origin  The vessel is normal in caliber throughout the neck  Scattered atherosclerotic ossifications  RIGHT EXTRACRANIAL CAROTID SEGMENT:  Moderate atherosclerotic disease of the bifurcation  There is mild narrowing of the right external carotid artery at the origin  There is mild narrowing of the right internal carotid artery at the origin  LEFT EXTRACRANIAL CAROTID SEGMENT:  Moderate atherosclerotic disease of the bifurcation  Approximately 50% narrowing at the origin of the left internal carotid artery  NASCET criteria was used to determine the degree of internal carotid artery diameter stenosis  INTRACRANIAL VASCULATURE INTERNAL CAROTID ARTERIES:  Mild narrowing due to calcification of the cavernous to supraclinoid internal carotid arteries bilaterally  Normal ophthalmic artery origins  Normal ICA terminus  ANTERIOR CIRCULATION:  Symmetric A1 segments and anterior cerebral arteries with normal enhancement  Normal anterior communicating artery  MIDDLE CEREBRAL ARTERY CIRCULATION:  M1 segments are patent  DISTAL VERTEBRAL ARTERIES:  Normal distal vertebral arteries  Posterior inferior cerebellar artery origins are normal  Normal vertebral basilar junction  BASILAR ARTERY:  Aneurysm coil seen at the basilar tip creating streak artifact limiting evaluation  A hyperdensity likely reflecting coil is also seen within the right P1 segment, stable  The distal P2 segment is patent   POSTERIOR CEREBRAL ARTERIES: Both posterior cerebral arteries arises from the basilar tip  Both arteries demonstrate normal enhancement  The posterior communicating arteries are hypoplastic or absent  DURAL VENOUS SINUSES:  Normal  NON VASCULAR ANATOMY BONY STRUCTURES:  No acute osseous abnormality  Multilevel degenerative changes  SOFT TISSUES OF THE NECK:  Unremarkable  THORACIC INLET:  Unremarkable  Impression: 1  Mild narrowing at the origin of the left common carotid artery, vertebral arteries bilaterally, and right internal carotid artery origins without significant stenosis  2   Approximately 50% narrowing at the origin of the left internal carotid artery  3   Mild narrowing of the cavernous to supraclinoid internal carotid arteries  4   No high-grade proximal stenosis to visualized [place]  5   Stable aneurysm coil seen at the basilar tip which limits evaluation due to streak artifact  Stable hyperdensity also likely reflecting a coil is seen within the right P1 segment which remains patent  Workstation performed: [id number]     Imaging Reports Reviewed Today Include:  CTA reviewed  Imaging Personally Reviewed by Myself Includes:    Procedure: X-ray Chest 1 View Portable    Result Date: 10/22/2018  Narrative: CHEST INDICATION:   stroke  COMPARISON:  Chest radiographs February 18, 2015 EXAM PERFORMED/VIEWS:  XR CHEST PORTABLE  AP semierect portable FINDINGS: Heart shadow appears unremarkable  Atherosclerotic vascular calcifications are noted  The lungs are clear  No pneumothorax or pleural effusion  Osseous structures appear within normal limits for patient age  Degenerative changes bilateral shoulders  Impression: No acute cardiopulmonary disease  Workstation performed: [id number]     Procedure: Ct Stroke Alert Brain    Result Date: 10/21/2018  Narrative: CT BRAIN - STROKE ALERT PROTOCOL INDICATION:   Stroke  COMPARISON:  CT of the head on March 8, 2017   TECHNIQUE:  CT examination of the brain was performed  In addition to axial images, coronal reformatted images were created and submitted for interpretation  Radiation dose length product (DLP) for this visit:  1075 mGy-cm   This examination, like all CT scans performed in the [facility], was performed utilizing techniques to minimize radiation dose exposure, including the use of iterative reconstruction and automated exposure control  IMAGE QUALITY:  Diagnostic  FINDINGS:  PARENCHYMA:  Decreased attenuation is noted in the supratentorial white matter demonstrating an appearance most consistent with mild microangiopathic change  No intracranial mass, mass effect or midline shift  No acute intracranial hemorrhage  No CT signs of acute infarction  Aneurysm coil is seen within the basilar tip region similar to prior exam  VENTRICLES AND EXTRA-AXIAL SPACES:  The ventricles are stable in size and configuration  VISUALIZED ORBITS AND PARANASAL SINUSES:  Unremarkable  CALVARIUM AND EXTRACRANIAL SOFT TISSUES:   Normal      Impression: 1  No acute intracranial hemorrhage  2   Chronic small vessel ischemic changes  3   Stable basilar tip region aneurysm coil  4   If there is continued clinical concern for acute infarct, further evaluation with MRI may be obtained which is more sensitive  Findings were directly discussed with Joyce Desai on 10/21/2018 9:18 PM  Workstation performed: [id number]     Procedure: Mri Brain Ms Wo And W Contrast    Result Date: 10/22/2018  Narrative: MRI BRAIN WITH AND WITHOUT CONTRAST INDICATION:  as per neurology recommendations  Demyelinating disease  COMPARISON:  Previous study from September 19, 2016, December 11, 2014 TECHNIQUE:  Sagittal T1, axial T2, axial FLAIR, axial T1  Axial diffusion-weighted imaging  Axial [place], Sagittal FLAIR CUBE  Axial [id number]  Sagittal BRAVO post contrast  IV Contrast:  8 mL of gadobutrol injection (MULTI-DOSE) IMAGE QUALITY:  Diagnostic   FINDINGS: BRAIN PARENCHYMA: There is a focal area of foot diffusion restriction in the left deep periventricular region involving the posterior aspect of the corona radiate  This is T2 hyperintense and hypointense on the FLAIR sequences  There is no enhancement noted on the post contrast images  Moderate to periventricular and white matter T2 hyperintensities are noted  These are unchanged as the previous study  There is no T2 hyperintense lesion seen within the infratentorial compartment, cerebellar peduncles and in the visualized the cervical cord  Postcontrast imaging of the brain demonstrates no abnormal enhancement  There is no discrete mass, mass effect or midline shift  There is no intracranial hemorrhage  There is no evidence of acute infarction  VENTRICLES:  Normal  SELLA AND PITUITARY GLAND:  Normal  ORBITS:  Normal  PARANASAL SINUSES:  Normal  VASCULATURE:  Evaluation of the major intracranial vasculature demonstrates appropriate flow voids  CALVARIUM AND SKULL BASE:  Normal  EXTRACRANIAL SOFT TISSUES:  Normal      Impression: There is a 1 2 cm focal area of diffusion restriction adjacent to the posterior body of the left lateral ventricle in the region of the posterior corona radiata without associated contrast enhancement compatible with acute lacunar infarct Moderate periventricular and white matter T2 hyperintensities noted, unchanged from the previous study of September 19, 2016 Nonenhancing focus to suggest active demyelination No acute hemorrhage seen  I personally discussed this study with Zaynab Carrillo on 10/22/2018 at 12:20 PM   Workstation performed: [id number]     Procedure: Cta Stroke Alert (head/neck)    Result Date: 10/21/2018  Narrative: CTA NECK AND BRAIN WITH CONTRAST INDICATION: Focal neuro deficit, new, fixed or worsening, <6 hours COMPARISON:   MRA of the head on 11/24/2017  CT of the head on March 8, 2017   TECHNIQUE: Post contrast imaging was performed after administration of iodinated contrast through the neck and brain  Post contrast axial 0 625 mm images timed to opacify the arterial system  3D rendering was performed on an independent workstation  MIP reconstructions performed  Coronal reconstructions were performed of the noncontrast portion of the brain  Radiation dose length product (DLP) for this visit:  694 mGy-cm   This examination, like all CT scans performed in the [facility], was performed utilizing techniques to minimize radiation dose exposure, including the use of iterative reconstruction and automated exposure control  IV Contrast:  90 mL of iohexol (OMNIPAQUE)  IMAGE QUALITY:   Diagnostic FINDINGS: CERVICAL VASCULATURE AORTIC ARCH AND GREAT VESSELS:  Moderate ahteroschlerotic disease of the arch and great vessels  Mild narrowing at the origin of the left common carotid artery  RIGHT VERTEBRAL ARTERY CERVICAL SEGMENT:  Mild narrowing at the origin  The vessel is normal in caliber throughout the neck  Scattered atherosclerotic calcifications  LEFT VERTEBRAL ARTERY CERVICAL SEGMENT:  Mild narrowing just distal to the origin  The vessel is normal in caliber throughout the neck  Scattered atherosclerotic ossifications  RIGHT EXTRACRANIAL CAROTID SEGMENT:  Moderate atherosclerotic disease of the bifurcation  There is mild narrowing of the right external carotid artery at the origin  There is mild narrowing of the right internal carotid artery at the origin  LEFT EXTRACRANIAL CAROTID SEGMENT:  Moderate atherosclerotic disease of the bifurcation  Approximately 50% narrowing at the origin of the left internal carotid artery  NASCET criteria was used to determine the degree of internal carotid artery diameter stenosis  INTRACRANIAL VASCULATURE INTERNAL CAROTID ARTERIES:  Mild narrowing due to calcification of the cavernous to supraclinoid internal carotid arteries bilaterally  Normal ophthalmic artery origins  Normal ICA terminus   ANTERIOR CIRCULATION:  Symmetric A1 segments and anterior cerebral arteries with normal enhancement  Normal anterior communicating artery  MIDDLE CEREBRAL ARTERY CIRCULATION:  M1 segments are patent  DISTAL VERTEBRAL ARTERIES:  Normal distal vertebral arteries  Posterior inferior cerebellar artery origins are normal  Normal vertebral basilar junction  BASILAR ARTERY:  Aneurysm coil seen at the basilar tip creating streak artifact limiting evaluation  A hyperdensity likely reflecting coil is also seen within the right P1 segment, stable  The distal P2 segment is patent  POSTERIOR CEREBRAL ARTERIES: Both posterior cerebral arteries arises from the basilar tip  Both arteries demonstrate normal enhancement  The posterior communicating arteries are hypoplastic or absent  DURAL VENOUS SINUSES:  Normal  NON VASCULAR ANATOMY BONY STRUCTURES:  No acute osseous abnormality  Multilevel degenerative changes  SOFT TISSUES OF THE NECK:  Unremarkable  THORACIC INLET:  Unremarkable  Impression: 1  Mild narrowing at the origin of the left common carotid artery, vertebral arteries bilaterally, and right internal carotid artery origins without significant stenosis  2   Approximately 50% narrowing at the origin of the left internal carotid artery  3   Mild narrowing of the cavernous to supraclinoid internal carotid arteries  4   No high-grade proximal stenosis to visualized [place]  5   Stable aneurysm coil seen at the basilar tip which limits evaluation due to streak artifact  Stable hyperdensity also likely reflecting a coil is seen within the right P1 segment which remains patent   Workstation performed: [id number]        Recent Cultures (last 7 days):       Results from last 7 days  Lab Units 10/21/18  1391   URINE CULTURE  >100,000 cfu/ml Providencia rettgeri*  >100,000 cfu/ml Morganella morganii*  >100,000 cfu/ml Gram Negative Torin*       Last 24 Hours Medication List:     Current Facility-Administered Medications:  acetaminophen 650 mg Oral Q6H PRN Patrick Sanfordti Reyez Rail   ALPRAZolam 0 25 mg Oral TID PRN Verdell Gilford, MD   amLODIPine 10 mg Oral Daily [facility]   aspirin 324 mg Oral Daily Obed Young, DO   atorvastatin 80 mg Oral HS Kenziejustina Jung PA-C   baclofen 5 mg Oral BID [facility]   bisacodyl 10 mg Rectal Daily PRN Verdell Gilford, MD   clopidogrel 75 mg Oral Daily Kenzieclarita Jung, PA-C   docusate sodium 100 mg Oral BID [facility]   furosemide 20 mg Oral BID [facility]   gabapentin 300 mg Oral TID [facility]   heparin (porcine) 5,000 Units Subcutaneous Q8H [facility]   hydrALAZINE 10 mg Intravenous Q6H PRN [facility]   losartan 50 mg Oral Daily [facility]   melatonin 6 mg Oral HS Kenzie Jung, PA-C   metFORMIN 500 mg Oral BID With Meals [facility]   pantoprazole 40 mg Oral BID AC [facility]   perflutren lipid microsphere 1 mL/min Intravenous Once in imaging Obed Young, DO   potassium chloride 10 mEq Oral Daily [facility]   propranolol 60 mg Oral Daily [facility]   simethicone 80 mg Oral Q6H PRN Phan Vazquez, DO   sucralfate 1 g Oral BID [facility]   zolpidem 5 mg Oral HS PRN Lana Quiroga        Today, Patient Was Seen By: Verdell Gilford, MD    ** Please Note: Dragon 360 Dictation voice to text software may have been used in the creation of this document   **

## 2018-10-24 NOTE — PLAN OF CARE
Problem: PHYSICAL THERAPY ADULT  Goal: Performs mobility at highest level of function for planned discharge setting  See evaluation for individualized goals  Treatment/Interventions: Functional transfer training, LE strengthening/ROM, gait training, transfer training, Therapeutic exercise, Endurance training, Patient/family training, Equipment eval/education, Bed mobility, Compensatory technique education, Continued evaluation, Spoke to nursing, OT  Equipment Recommended:  (monitor)       See flowsheet documentation for full assessment, interventions and recommendations  Outcome: Progressing  Prognosis: Fair  Problem List: Decreased strength, Decreased endurance, Impaired balance, Decreased mobility, Decreased range of motion, Decreased coordination, Decreased safety awareness, Impaired tone, Impaired sensation  Assessment: Pt resting in bed at time of PT treatment session  Pt reports feeling " better" today and is willing to participate in PT treatment session  Pt able to perform all bed mobility with mod A x 2 which is improved compared to previous session, however A still required for LE management and weight shifts  Pt able to perform all transfers with max A x 2 this session which is also improved compared to previous session  VC and TC required for hand placement and safety as well as physical assistance to facilitate proper weight shifts with transfers  Pt able to tolerate standing x 1 min and was able to tolerate 2 lateral steps toward [facility] with max A x2  Additional ambulation unable to be attempted at this time secondary to fatigue and LE weakness  Pt noted to have decreased foot clearance and improper weight shifts with ambulation  Pt utilized quick move for OOB mobility to chair this session due to decreased standing tolerance and ambulation tolerance  Pt continues to require increased time to complete all mobility and therex due to fatigue and LE weakness   Pt able to tolerate and perform all therex seated OOB in chair this session and denies any new complaints  Pt continues to require assist to perform therex through full available ROM  Pt continues to demonstrate progress with PT and will continue to benefit from skilled PT during hospital stay  Pt assisted back into chair at conclusion of PT session with all needs within reach  Pt and pts family deny any further questions at this time  PT will continue to follow  D/C recommendation when medically cleared is rehab  Barriers to Discharge: Inaccessible home environment  Barriers to Discharge Comments: 3 DAI   Recommendation: Short-term skilled PT     PT - OK to Discharge: Yes (to rehab when medically cleared )    See flowsheet documentation for full assessment

## 2018-10-25 LAB
BACTERIA UR CULT: ABNORMAL

## 2018-10-25 PROCEDURE — 97116 GAIT TRAINING THERAPY: CPT

## 2018-10-25 PROCEDURE — 97110 THERAPEUTIC EXERCISES: CPT

## 2018-10-25 PROCEDURE — 97530 THERAPEUTIC ACTIVITIES: CPT

## 2018-10-25 PROCEDURE — 97535 SELF CARE MNGMENT TRAINING: CPT

## 2018-10-25 PROCEDURE — 99232 SBSQ HOSP IP/OBS MODERATE 35: CPT | Performed by: INTERNAL MEDICINE

## 2018-10-25 RX ORDER — ACETAMINOPHEN 325 MG/1
650 TABLET ORAL EVERY 6 HOURS PRN
Status: DISCONTINUED | OUTPATIENT
Start: 2018-10-25 | End: 2018-10-26 | Stop reason: HOSPADM

## 2018-10-25 RX ADMIN — ALPRAZOLAM 0.25 MG: 0.25 TABLET ORAL at 21:15

## 2018-10-25 RX ADMIN — HEPARIN SODIUM 5000 UNITS: 5000 INJECTION INTRAVENOUS; SUBCUTANEOUS at 15:44

## 2018-10-25 RX ADMIN — ALPRAZOLAM 0.25 MG: 0.25 TABLET ORAL at 05:47

## 2018-10-25 RX ADMIN — POTASSIUM CHLORIDE 10 MEQ: 750 TABLET, EXTENDED RELEASE ORAL at 09:28

## 2018-10-25 RX ADMIN — HEPARIN SODIUM 5000 UNITS: 5000 INJECTION INTRAVENOUS; SUBCUTANEOUS at 05:47

## 2018-10-25 RX ADMIN — AMLODIPINE BESYLATE 10 MG: 10 TABLET ORAL at 09:28

## 2018-10-25 RX ADMIN — GABAPENTIN 300 MG: 300 CAPSULE ORAL at 09:28

## 2018-10-25 RX ADMIN — METFORMIN HYDROCHLORIDE 500 MG: 500 TABLET, FILM COATED ORAL at 15:44

## 2018-10-25 RX ADMIN — FUROSEMIDE 20 MG: 20 TABLET ORAL at 09:28

## 2018-10-25 RX ADMIN — BACLOFEN 5 MG: 10 TABLET ORAL at 09:28

## 2018-10-25 RX ADMIN — GABAPENTIN 300 MG: 300 CAPSULE ORAL at 21:16

## 2018-10-25 RX ADMIN — ATORVASTATIN CALCIUM 80 MG: 80 TABLET, FILM COATED ORAL at 21:16

## 2018-10-25 RX ADMIN — ACETAMINOPHEN 650 MG: 325 TABLET, FILM COATED ORAL at 01:35

## 2018-10-25 RX ADMIN — METFORMIN HYDROCHLORIDE 500 MG: 500 TABLET, FILM COATED ORAL at 09:28

## 2018-10-25 RX ADMIN — SUCRALFATE 1 G: 1 TABLET ORAL at 17:37

## 2018-10-25 RX ADMIN — PANTOPRAZOLE SODIUM 40 MG: 40 TABLET, DELAYED RELEASE ORAL at 15:44

## 2018-10-25 RX ADMIN — BACLOFEN 5 MG: 10 TABLET ORAL at 17:37

## 2018-10-25 RX ADMIN — GABAPENTIN 300 MG: 300 CAPSULE ORAL at 15:44

## 2018-10-25 RX ADMIN — PROPRANOLOL HYDROCHLORIDE 60 MG: 60 CAPSULE, EXTENDED RELEASE ORAL at 09:28

## 2018-10-25 RX ADMIN — LOSARTAN POTASSIUM 50 MG: 50 TABLET ORAL at 09:29

## 2018-10-25 RX ADMIN — SIMETHICONE CHEW TAB 80 MG 80 MG: 80 TABLET ORAL at 05:47

## 2018-10-25 RX ADMIN — MELATONIN TAB 3 MG 6 MG: 3 TAB at 21:16

## 2018-10-25 RX ADMIN — FUROSEMIDE 20 MG: 20 TABLET ORAL at 17:37

## 2018-10-25 RX ADMIN — ASPIRIN 324 MG: 81 TABLET, COATED ORAL at 09:29

## 2018-10-25 RX ADMIN — DOCUSATE SODIUM 100 MG: 100 CAPSULE, LIQUID FILLED ORAL at 17:37

## 2018-10-25 RX ADMIN — DOCUSATE SODIUM 100 MG: 100 CAPSULE, LIQUID FILLED ORAL at 09:29

## 2018-10-25 RX ADMIN — CLOPIDOGREL 75 MG: 75 TABLET, FILM COATED ORAL at 09:29

## 2018-10-25 RX ADMIN — PANTOPRAZOLE SODIUM 40 MG: 40 TABLET, DELAYED RELEASE ORAL at 05:47

## 2018-10-25 RX ADMIN — HEPARIN SODIUM 5000 UNITS: 5000 INJECTION INTRAVENOUS; SUBCUTANEOUS at 21:15

## 2018-10-25 RX ADMIN — SIMETHICONE CHEW TAB 80 MG 80 MG: 80 TABLET ORAL at 17:49

## 2018-10-25 RX ADMIN — SUCRALFATE 1 G: 1 TABLET ORAL at 09:28

## 2018-10-25 NOTE — PROGRESS NOTES
[facility] Progress Note  Patient: Marcia Neumann 70 y o  male   MRN: [id number]  PCP: Gee Croft DO  Unit/Bed#: E4 -01 Encounter: [id number]  Date Of Visit: 10/25/18    Assessment:    Principal Problem:    CVA (cerebral vascular accident) [facility])  Active Problems:    Diabetic neuropathy ([facility] )    Depression    Benign prostatic hyperplasia    Chronic suprapubic catheter ([facility] )    Dyslipidemia    Hyperlipidemia    Hypertension    Multiple sclerosis ([place] [address] )    Neurogenic bladder    Obstructive sleep apnea    Type 2 diabetes mellitus ([facility] )      Plan:    · CVA, right upper and lower extremity weakness new in patient with known history of multiple sclerosis but MRI showing single restricted diffusion area in left corona radiata consistent with small vessel lacunar infarct which is acute aspirin discontinued in favor of Plavix 75 mg p o  Daily atorvastatin increased from 20 to 80 mg daily, 2D echocardiogram pending CTA was unremarkable for hemodynamic significant stenosis API study showed therapeutic response had been on 325 aspirin as outpatient  · Right-sided hay paresis new from recent stroke which had been his dominant side in relation to his history of multiple sclerosis with neurogenic bladder and suprapubic catheter also per PT evaluation he has significantly increased risk of falls and weakness as result of acute CVA and will require acute rehab await final evaluation PT/OT  · Type 2 diabetes with neuropathy continues on metformin here 500 mg b i d   And sliding scale coverage  · Multiple sclerosis suprapubic catheter and baclofen 4 spasticity, gabapentin for neuropathy  · Hyperlipidemia  and may setting of acute CVA warrants maximal statin drug therapy at 80 mg and atorvastatin  · UTI with more than 100,000 gram-negative rods growing in urine however patient has suprapubic catheter culture results grew Morganella and Providencia multi drug-resistant would not treat as patient is not septic presume to be colonized  · Hypertension continue losartan and furosemide 20 mg b i d       VTE Pharmacologic Prophylaxis:   Pharmacologic: Heparin  Mechanical VTE Prophylaxis in Place: Yes    Discussions with Specialists or Other Care Team Provider:  Now    Time Spent for Care: 45 minutes  More than 50% of total time spent on counseling and coordination of care as described above  Subjective:   Pleasant remains quite weak and needs 2 person assist just to obtain upright status in bed without falling off acknowledges the need for acute rehab in  he works with as outpatient present in room and states he is only available to go to [facility] patient would like to go to [facility] but not available to him  Case management considering [address]  Objective:     Vitals:   Temp (24hrs), Av 8 °F (37 1 °C), Min:98 6 °F (37 °C), Max:99 1 °F (37 3 °C)    Temp:  [98 6 °F (37 °C)-99 1 °F (37 3 °C)] 98 6 °F (37 °C)  HR:  [85-86] 86  Resp:  [18-19] 18  BP: (110-141)/(59-75) 141/59  SpO2:  [95 %-97 %] 96 %  Body mass index is 31 11 kg/m²  Input and Output Summary (last 24 hours):        Intake/Output Summary (Last 24 hours) at 10/25/18 1238  Last data filed at 10/25/18 1100   Gross per 24 hour   Intake             1080 ml   Output             2350 ml   Net            -1270 ml       Physical Exam:     Physical Exam:   General appearance: alert, appears stated age and cooperative  Head: Normocephalic, without obvious abnormality, atraumatic  Lungs: clear to auscultation bilaterally  Heart: regular rate and rhythm  Abdomen: soft, non-tender; bowel sounds normal; no masses,  no organomegaly  Back: negative  Extremities: 2/5 strength right upper extremity summoned improvement in right lower extremity to 3 to 4 out of 5 strength very unsteady gait and needs 2 person assist  Neurologic: Motor: grade 2 biceps on the right      Additional Data: Labs:      Results from last 7 days  Lab Units 10/22/18  0545   WBC Thousand/uL 11 76*   HEMOGLOBIN g/dL 14 4   HEMATOCRIT % 42 6   PLATELETS Thousands/uL 339       Results from last 7 days  Lab Units 10/22/18  0545 10/21/18  2118   SODIUM mmol/L 133*  --    POTASSIUM mmol/L 3 6  --    CHLORIDE mmol/L 99*  --    CO2 mmol/L 24  --    BUN mg/dL 7  --    CREATININE mg/dL 0 85  --    CALCIUM mg/dL 9 3  --    ALK PHOS U/L 85  --    ALT U/L 43  --    AST U/L 18  --    GLUCOSE, ISTAT mg/dl  --  190*       Results from last 7 days  Lab Units 10/21/18  2114   INR  0 90       * I Have Reviewed All Lab Data Listed Above  * Additional Pertinent Lab Tests Reviewed: All Labs For Current Hospital Admission Reviewed    Imaging:  X-ray Chest 1 View Portable    Result Date: 10/22/2018  Narrative: CHEST INDICATION:   stroke  COMPARISON:  Chest radiographs February 18, 2015 EXAM PERFORMED/VIEWS:  XR CHEST PORTABLE  AP semierect portable FINDINGS: Heart shadow appears unremarkable  Atherosclerotic vascular calcifications are noted  The lungs are clear  No pneumothorax or pleural effusion  Osseous structures appear within normal limits for patient age  Degenerative changes bilateral shoulders  Impression: No acute cardiopulmonary disease  Workstation performed: [id number]     Ct Stroke Alert Brain    Result Date: 10/21/2018  Narrative: CT BRAIN - STROKE ALERT PROTOCOL INDICATION:   Stroke  COMPARISON:  CT of the head on March 8, 2017  TECHNIQUE:  CT examination of the brain was performed  In addition to axial images, coronal reformatted images were created and submitted for interpretation  Radiation dose length product (DLP) for this visit:  1075 mGy-cm   This examination, like all CT scans performed in the [facility], was performed utilizing techniques to minimize radiation dose exposure, including the use of iterative reconstruction and automated exposure control  IMAGE QUALITY:  Diagnostic   FINDINGS: PARENCHYMA:  Decreased attenuation is noted in the supratentorial white matter demonstrating an appearance most consistent with mild microangiopathic change  No intracranial mass, mass effect or midline shift  No acute intracranial hemorrhage  No CT signs of acute infarction  Aneurysm coil is seen within the basilar tip region similar to prior exam  VENTRICLES AND EXTRA-AXIAL SPACES:  The ventricles are stable in size and configuration  VISUALIZED ORBITS AND PARANASAL SINUSES:  Unremarkable  CALVARIUM AND EXTRACRANIAL SOFT TISSUES:   Normal      Impression: 1  No acute intracranial hemorrhage  2   Chronic small vessel ischemic changes  3   Stable basilar tip region aneurysm coil  4   If there is continued clinical concern for acute infarct, further evaluation with MRI may be obtained which is more sensitive  Findings were directly discussed with Manuela Andrade on 10/21/2018 9:18 PM  Workstation performed: [id number]     Mri Brain Ms Wo And W Contrast    Result Date: 10/22/2018  Narrative: MRI BRAIN WITH AND WITHOUT CONTRAST INDICATION:  as per neurology recommendations  Demyelinating disease  COMPARISON:  Previous study from September 19, 2016, December 11, 2014 TECHNIQUE:  Sagittal T1, axial T2, axial FLAIR, axial T1  Axial diffusion-weighted imaging  Axial [place], Sagittal FLAIR CUBE  Axial [id number]  Sagittal BRAVO post contrast  IV Contrast:  8 mL of gadobutrol injection (MULTI-DOSE) IMAGE QUALITY:  Diagnostic  FINDINGS: BRAIN PARENCHYMA: There is a focal area of foot diffusion restriction in the left deep periventricular region involving the posterior aspect of the corona radiate  This is T2 hyperintense and hypointense on the FLAIR sequences  There is no enhancement noted on the post contrast images  Moderate to periventricular and white matter T2 hyperintensities are noted  These are unchanged as the previous study    There is no T2 hyperintense lesion seen within the infratentorial compartment, cerebellar peduncles and in the visualized the cervical cord  Postcontrast imaging of the brain demonstrates no abnormal enhancement  There is no discrete mass, mass effect or midline shift  There is no intracranial hemorrhage  There is no evidence of acute infarction  VENTRICLES:  Normal  SELLA AND PITUITARY GLAND:  Normal  ORBITS:  Normal  PARANASAL SINUSES:  Normal  VASCULATURE:  Evaluation of the major intracranial vasculature demonstrates appropriate flow voids  CALVARIUM AND SKULL BASE:  Normal  EXTRACRANIAL SOFT TISSUES:  Normal      Impression: There is a 1 2 cm focal area of diffusion restriction adjacent to the posterior body of the left lateral ventricle in the region of the posterior corona radiata without associated contrast enhancement compatible with acute lacunar infarct Moderate periventricular and white matter T2 hyperintensities noted, unchanged from the previous study of September 19, 2016 Nonenhancing focus to suggest active demyelination No acute hemorrhage seen  I personally discussed this study with Diana Escamilla on 10/22/2018 at 12:20 PM   Workstation performed: [id number]     Cta Stroke Alert (head/neck)    Result Date: 10/21/2018  Narrative: CTA NECK AND BRAIN WITH CONTRAST INDICATION: Focal neuro deficit, new, fixed or worsening, <6 hours COMPARISON:   MRA of the head on 11/24/2017  CT of the head on March 8, 2017  TECHNIQUE: Post contrast imaging was performed after administration of iodinated contrast through the neck and brain  Post contrast axial 0 625 mm images timed to opacify the arterial system  3D rendering was performed on an independent workstation  MIP reconstructions performed  Coronal reconstructions were performed of the noncontrast portion of the brain  Radiation dose length product (DLP) for this visit:  694 mGy-cm     This examination, like all CT scans performed in the [facility], was performed utilizing techniques to minimize radiation dose exposure, including the use of iterative reconstruction and automated exposure control  IV Contrast:  90 mL of iohexol (OMNIPAQUE)  IMAGE QUALITY:   Diagnostic FINDINGS: CERVICAL VASCULATURE AORTIC ARCH AND GREAT VESSELS:  Moderate ahteroschlerotic disease of the arch and great vessels  Mild narrowing at the origin of the left common carotid artery  RIGHT VERTEBRAL ARTERY CERVICAL SEGMENT:  Mild narrowing at the origin  The vessel is normal in caliber throughout the neck  Scattered atherosclerotic calcifications  LEFT VERTEBRAL ARTERY CERVICAL SEGMENT:  Mild narrowing just distal to the origin  The vessel is normal in caliber throughout the neck  Scattered atherosclerotic ossifications  RIGHT EXTRACRANIAL CAROTID SEGMENT:  Moderate atherosclerotic disease of the bifurcation  There is mild narrowing of the right external carotid artery at the origin  There is mild narrowing of the right internal carotid artery at the origin  LEFT EXTRACRANIAL CAROTID SEGMENT:  Moderate atherosclerotic disease of the bifurcation  Approximately 50% narrowing at the origin of the left internal carotid artery  NASCET criteria was used to determine the degree of internal carotid artery diameter stenosis  INTRACRANIAL VASCULATURE INTERNAL CAROTID ARTERIES:  Mild narrowing due to calcification of the cavernous to supraclinoid internal carotid arteries bilaterally  Normal ophthalmic artery origins  Normal ICA terminus  ANTERIOR CIRCULATION:  Symmetric A1 segments and anterior cerebral arteries with normal enhancement  Normal anterior communicating artery  MIDDLE CEREBRAL ARTERY CIRCULATION:  M1 segments are patent  DISTAL VERTEBRAL ARTERIES:  Normal distal vertebral arteries  Posterior inferior cerebellar artery origins are normal  Normal vertebral basilar junction  BASILAR ARTERY:  Aneurysm coil seen at the basilar tip creating streak artifact limiting evaluation    A hyperdensity likely reflecting coil is also seen within the right P1 segment, stable  The distal P2 segment is patent  POSTERIOR CEREBRAL ARTERIES: Both posterior cerebral arteries arises from the basilar tip  Both arteries demonstrate normal enhancement  The posterior communicating arteries are hypoplastic or absent  DURAL VENOUS SINUSES:  Normal  NON VASCULAR ANATOMY BONY STRUCTURES:  No acute osseous abnormality  Multilevel degenerative changes  SOFT TISSUES OF THE NECK:  Unremarkable  THORACIC INLET:  Unremarkable  Impression: 1  Mild narrowing at the origin of the left common carotid artery, vertebral arteries bilaterally, and right internal carotid artery origins without significant stenosis  2   Approximately 50% narrowing at the origin of the left internal carotid artery  3   Mild narrowing of the cavernous to supraclinoid internal carotid arteries  4   No high-grade proximal stenosis to visualized [place]  5   Stable aneurysm coil seen at the basilar tip which limits evaluation due to streak artifact  Stable hyperdensity also likely reflecting a coil is seen within the right P1 segment which remains patent  Workstation performed: [id number]     Imaging Reports Reviewed Today Include:  CTA reviewed  Imaging Personally Reviewed by Myself Includes:    Procedure: X-ray Chest 1 View Portable    Result Date: 10/22/2018  Narrative: CHEST INDICATION:   stroke  COMPARISON:  Chest radiographs February 18, 2015 EXAM PERFORMED/VIEWS:  XR CHEST PORTABLE  AP semierect portable FINDINGS: Heart shadow appears unremarkable  Atherosclerotic vascular calcifications are noted  The lungs are clear  No pneumothorax or pleural effusion  Osseous structures appear within normal limits for patient age  Degenerative changes bilateral shoulders  Impression: No acute cardiopulmonary disease   Workstation performed: [id number]     Procedure: Ct Stroke Alert Brain    Result Date: 10/21/2018  Narrative: CT BRAIN - STROKE ALERT PROTOCOL INDICATION: Stroke  COMPARISON:  CT of the head on March 8, 2017  TECHNIQUE:  CT examination of the brain was performed  In addition to axial images, coronal reformatted images were created and submitted for interpretation  Radiation dose length product (DLP) for this visit:  1075 mGy-cm   This examination, like all CT scans performed in the [facility], was performed utilizing techniques to minimize radiation dose exposure, including the use of iterative reconstruction and automated exposure control  IMAGE QUALITY:  Diagnostic  FINDINGS:  PARENCHYMA:  Decreased attenuation is noted in the supratentorial white matter demonstrating an appearance most consistent with mild microangiopathic change  No intracranial mass, mass effect or midline shift  No acute intracranial hemorrhage  No CT signs of acute infarction  Aneurysm coil is seen within the basilar tip region similar to prior exam  VENTRICLES AND EXTRA-AXIAL SPACES:  The ventricles are stable in size and configuration  VISUALIZED ORBITS AND PARANASAL SINUSES:  Unremarkable  CALVARIUM AND EXTRACRANIAL SOFT TISSUES:   Normal      Impression: 1  No acute intracranial hemorrhage  2   Chronic small vessel ischemic changes  3   Stable basilar tip region aneurysm coil  4   If there is continued clinical concern for acute infarct, further evaluation with MRI may be obtained which is more sensitive  Findings were directly discussed with Hi Masterson on 10/21/2018 9:18 PM  Workstation performed: [id number]     Procedure: Mri Brain Ms Wo And W Contrast    Result Date: 10/22/2018  Narrative: MRI BRAIN WITH AND WITHOUT CONTRAST INDICATION:  as per neurology recommendations  Demyelinating disease  COMPARISON:  Previous study from September 19, 2016, December 11, 2014 TECHNIQUE:  Sagittal T1, axial T2, axial FLAIR, axial T1  Axial diffusion-weighted imaging  Axial [place], Sagittal FLAIR CUBE    Axial [id number]  Sagittal BRAVO post contrast  IV Contrast: 8 mL of gadobutrol injection (MULTI-DOSE) IMAGE QUALITY:  Diagnostic  FINDINGS: BRAIN PARENCHYMA: There is a focal area of foot diffusion restriction in the left deep periventricular region involving the posterior aspect of the corona radiate  This is T2 hyperintense and hypointense on the FLAIR sequences  There is no enhancement noted on the post contrast images  Moderate to periventricular and white matter T2 hyperintensities are noted  These are unchanged as the previous study  There is no T2 hyperintense lesion seen within the infratentorial compartment, cerebellar peduncles and in the visualized the cervical cord  Postcontrast imaging of the brain demonstrates no abnormal enhancement  There is no discrete mass, mass effect or midline shift  There is no intracranial hemorrhage  There is no evidence of acute infarction  VENTRICLES:  Normal  SELLA AND PITUITARY GLAND:  Normal  ORBITS:  Normal  PARANASAL SINUSES:  Normal  VASCULATURE:  Evaluation of the major intracranial vasculature demonstrates appropriate flow voids  CALVARIUM AND SKULL BASE:  Normal  EXTRACRANIAL SOFT TISSUES:  Normal      Impression: There is a 1 2 cm focal area of diffusion restriction adjacent to the posterior body of the left lateral ventricle in the region of the posterior corona radiata without associated contrast enhancement compatible with acute lacunar infarct Moderate periventricular and white matter T2 hyperintensities noted, unchanged from the previous study of September 19, 2016 Nonenhancing focus to suggest active demyelination No acute hemorrhage seen  I personally discussed this study with Marge Moss on 10/22/2018 at 12:20 PM   Workstation performed: [id number]     Procedure: Cta Stroke Alert (head/neck)    Result Date: 10/21/2018  Narrative: CTA NECK AND BRAIN WITH CONTRAST INDICATION: Focal neuro deficit, new, fixed or worsening, <6 hours COMPARISON:   MRA of the head on 11/24/2017  CT of the head on March 8, 2017  TECHNIQUE: Post contrast imaging was performed after administration of iodinated contrast through the neck and brain  Post contrast axial 0 625 mm images timed to opacify the arterial system  3D rendering was performed on an independent workstation  MIP reconstructions performed  Coronal reconstructions were performed of the noncontrast portion of the brain  Radiation dose length product (DLP) for this visit:  694 mGy-cm   This examination, like all CT scans performed in the [facility], was performed utilizing techniques to minimize radiation dose exposure, including the use of iterative reconstruction and automated exposure control  IV Contrast:  90 mL of iohexol (OMNIPAQUE)  IMAGE QUALITY:   Diagnostic FINDINGS: CERVICAL VASCULATURE AORTIC ARCH AND GREAT VESSELS:  Moderate ahteroschlerotic disease of the arch and great vessels  Mild narrowing at the origin of the left common carotid artery  RIGHT VERTEBRAL ARTERY CERVICAL SEGMENT:  Mild narrowing at the origin  The vessel is normal in caliber throughout the neck  Scattered atherosclerotic calcifications  LEFT VERTEBRAL ARTERY CERVICAL SEGMENT:  Mild narrowing just distal to the origin  The vessel is normal in caliber throughout the neck  Scattered atherosclerotic ossifications  RIGHT EXTRACRANIAL CAROTID SEGMENT:  Moderate atherosclerotic disease of the bifurcation  There is mild narrowing of the right external carotid artery at the origin  There is mild narrowing of the right internal carotid artery at the origin  LEFT EXTRACRANIAL CAROTID SEGMENT:  Moderate atherosclerotic disease of the bifurcation  Approximately 50% narrowing at the origin of the left internal carotid artery  NASCET criteria was used to determine the degree of internal carotid artery diameter stenosis  INTRACRANIAL VASCULATURE INTERNAL CAROTID ARTERIES:  Mild narrowing due to calcification of the cavernous to supraclinoid internal carotid arteries bilaterally  Normal ophthalmic artery origins  Normal ICA terminus  ANTERIOR CIRCULATION:  Symmetric A1 segments and anterior cerebral arteries with normal enhancement  Normal anterior communicating artery  MIDDLE CEREBRAL ARTERY CIRCULATION:  M1 segments are patent  DISTAL VERTEBRAL ARTERIES:  Normal distal vertebral arteries  Posterior inferior cerebellar artery origins are normal  Normal vertebral basilar junction  BASILAR ARTERY:  Aneurysm coil seen at the basilar tip creating streak artifact limiting evaluation  A hyperdensity likely reflecting coil is also seen within the right P1 segment, stable  The distal P2 segment is patent  POSTERIOR CEREBRAL ARTERIES: Both posterior cerebral arteries arises from the basilar tip  Both arteries demonstrate normal enhancement  The posterior communicating arteries are hypoplastic or absent  DURAL VENOUS SINUSES:  Normal  NON VASCULAR ANATOMY BONY STRUCTURES:  No acute osseous abnormality  Multilevel degenerative changes  SOFT TISSUES OF THE NECK:  Unremarkable  THORACIC INLET:  Unremarkable  Impression: 1  Mild narrowing at the origin of the left common carotid artery, vertebral arteries bilaterally, and right internal carotid artery origins without significant stenosis  2   Approximately 50% narrowing at the origin of the left internal carotid artery  3   Mild narrowing of the cavernous to supraclinoid internal carotid arteries  4   No high-grade proximal stenosis to visualized [place]  5   Stable aneurysm coil seen at the basilar tip which limits evaluation due to streak artifact  Stable hyperdensity also likely reflecting a coil is seen within the right P1 segment which remains patent   Workstation performed: [id number]        Recent Cultures (last 7 days):       Results from last 7 days  Lab Units 10/21/18  1722   URINE CULTURE  >100,000 cfu/ml Providencia rettgeri*  >100,000 cfu/ml Morganella morganii*  >100,000 cfu/ml Pseudomonas aeruginosa*       Last 24 Hours Medication List:     Current Facility-Administered Medications:  acetaminophen 650 mg Oral Q6H PRN Shauna Hill MD   ALPRAZolam 0 25 mg Oral TID PRN Shauna Hill MD   amLODIPine 10 mg Oral Daily [facility]   aspirin 324 mg Oral Daily Obed Young, DO   atorvastatin 80 mg Oral HS EFREN JohansenC   baclofen 5 mg Oral BID [facility]   bisacodyl 10 mg Rectal Daily PRN Shauna Hill MD   clopidogrel 75 mg Oral Daily EFREN JohansenC   docusate sodium 100 mg Oral BID [facility]   furosemide 20 mg Oral BID [facility]   gabapentin 300 mg Oral TID [facility]   heparin (porcine) 5,000 Units Subcutaneous Q8H [facility]   hydrALAZINE 10 mg Intravenous Q6H PRN [facility]   losartan 50 mg Oral Daily [facility]   melatonin 6 mg Oral HS Kenzie Jung PA-C   metFORMIN 500 mg Oral BID With Meals [facility]   pantoprazole 40 mg Oral BID AC [facility]   perflutren lipid microsphere 1 mL/min Intravenous Once in imaging Obed Young, DO   potassium chloride 10 mEq Oral Daily [facility]   propranolol 60 mg Oral Daily [facility]   simethicone 80 mg Oral Q6H PRN Phan Vazquez, DO   sucralfate 1 g Oral BID [facility]   zolpidem 5 mg Oral HS PRN Angela Brunner        Today, Patient Was Seen By: Shauna Hill MD    ** Please Note: Dragon 360 Dictation voice to text software may have been used in the creation of this document   **

## 2018-10-25 NOTE — NURSING NOTE
Pt rang call bell alerting of 8/10 pain in R hip, inquiring if he had any medication to alleviate the pain  Only PRN medication available was Tylenol, but only ordered for fever  Paged RRNP with orders to give 650 mg Tylenol for the pain

## 2018-10-25 NOTE — PLAN OF CARE
Problem: DISCHARGE PLANNING - CARE MANAGEMENT  Goal: Discharge to post-acute care or home with appropriate resources  INTERVENTIONS:  - Conduct assessment to determine patient/family and health care team treatment goals, and need for post-acute services based on payer coverage, community resources, and patient preferences, and barriers to discharge  - Address psychosocial, clinical, and financial barriers to discharge as identified in assessment in conjunction with the patient/family and health care team  - Arrange appropriate level of post-acute services according to patients   needs and preference and payer coverage in collaboration with the physician and health care team  - Communicate with and update the patient/family, physician, and health care team regarding progress on the discharge plan  - Arrange appropriate transportation to post-acute venues   Outcome: Progressing  PT is recommending a short stay rehab  Informed him [facility] has contacts with [facility] [address] [place] [address]  Pt would be agreeable to [facility] Brewster is requesting PASRR  This was completed and fax to them for determination

## 2018-10-25 NOTE — PHYSICAL THERAPY NOTE
Physical Therapy Progress Note     10/25/18 1220   Pain Assessment   Pain Assessment No/denies pain   Pain Score No Pain   Hospital Pain Intervention(s) Ambulation/increased activity;Repositioned   Response to Interventions Tolerated  Restrictions/Precautions   Weight Bearing Precautions Per Order No   Other Precautions Fall Risk;Visual impairment;Multiple lines;Contact/isolation  (R hemiparesis)   General   Chart Reviewed Yes   Response to Previous Treatment Patient reporting fatigue but able to participate  Family/Caregiver Present No   Subjective   Subjective Willing to participate in therapy this PM    Bed Mobility   Supine to Sit 2  Maximal assistance   Additional items Assist x 2;Bedrails;HOB elevated;Leg ; Increased time required;Verbal cues;LE management   Transfers   Sit to Stand 2  Maximal assistance   Additional items Assist x 2;Bedrails; Increased time required;Verbal cues   Stand to Sit 2  Maximal assistance   Additional items Assist x 2;Armrests; Increased time required;Verbal cues   Ambulation/Elevation   Gait pattern Decreased foot clearance; Improper Weight shift;R Foot drag;R Knee Courtney; Short stride; Step to;Excessively slow; Inconsistent toño;Decreased R stance   Gait Assistance 2  Maximal assist   Additional items Assist x 2;Verbal cues; Tactile cues  (with third present for safety)   Assistive Device Rolling walker   Distance 5'   Balance   Static Sitting Poor +   Dynamic Sitting Poor   Static Standing Poor -   Dynamic Standing Poor -   Ambulatory Poor -   Endurance Deficit   Endurance Deficit Yes   Endurance Deficit Description fatigue/weakness   Activity Tolerance   Activity Tolerance Patient limited by fatigue   Nurse Made Aware Yes   Exercises   TKR Supine;10 reps;AAROM; Bilateral   Assessment   Prognosis Fair   Problem List Decreased strength;Decreased range of motion;Decreased endurance; Impaired balance;Decreased mobility; Decreased coordination;Decreased safety awareness; Impaired tone;Impaired sensation; Impaired vision;Obesity; Decreased skin integrity   Assessment Pt  supine in bed upon my arrival  Pt  eager to participate in therapy this PM  Performance of HEP supine in bed with A of therapist provided for proper completion  Progressed with transfers continuing to require max A of 2 with cues for hand placement/technique  Positioned seated at EOB for additional time with no noted LOB  Progressed with OOB mobility, pt  able to complete an amb  trial with use of RW and A of 2 with A required for lateral weight shift/advance of RLE due to foot drag at this time  After amb  trial pt  positioned in bedside chair  Pt  incontinent of stool, requiring several standing trials with use of RW and A of 2 with third present for pericare  Repositioned seated in bedside chair with OTR present at end of session  PT will continue to recommend rehab upon d/c for continued improvement of noted impairments above  Barriers to Discharge Inaccessible home environment   Barriers to Discharge Comments DAI   Goals   Patient Goals To go to rehab  STG Expiration Date 11/02/18   Treatment Day 2   Plan   Treatment/Interventions Functional transfer training;LE strengthening/ROM; Therapeutic exercise; Endurance training;Bed mobility;Gait training;Spoke to nursing;Spoke to case management;OT   Progress Progressing toward goals   PT Frequency Other (Comment)  (4-7x/wk)   Recommendation   Recommendation Other (Comment)  (rehab)   Equipment Recommended Dunlap Coins; Wheelchair  (RW)   PT - OK to Discharge Yes  (if d/c to rehab when medically stable )     Scott Dobbs PTA

## 2018-10-25 NOTE — UTILIZATION REVIEW
Continued Stay Review    Date: 10/24/2018  And  10/25  below    VITAL SIGNS: 98 5 - 92 - 20   131/70    Medications:   Scheduled Meds:   Current Facility-Administered Medications:  acetaminophen 650 mg Oral Q6H PRN Lissette Wan MD   ALPRAZolam 0 25 mg Oral TID PRN Lissette Wan MD   amLODIPine 10 mg Oral Daily [facility]   aspirin 324 mg Oral Daily Obed Young, DO   atorvastatin 80 mg Oral HS Kenzie Jung PA-C   baclofen 5 mg Oral BID [facility]   bisacodyl 10 mg Rectal Daily PRN Lissette Wan MD   clopidogrel 75 mg Oral Daily Kenzie Jung PA-C   docusate sodium 100 mg Oral BID [facility]   furosemide 20 mg Oral BID [facility]   gabapentin 300 mg Oral TID [facility]   heparin (porcine) 5,000 Units Subcutaneous Q8H [address] [facility]   hydrALAZINE 10 mg Intravenous Q6H PRN [facility]   losartan 50 mg Oral Daily [facility]   melatonin 6 mg Oral HS Kenzie Jung PA-C   metFORMIN 500 mg Oral BID With Meals [facility]   pantoprazole 40 mg Oral BID AC [facility]   perflutren lipid microsphere 1 mL/min Intravenous Once in imaging Obed Young, DO   potassium chloride 10 mEq Oral Daily [facility]   propranolol 60 mg Oral Daily [facility]   simethicone 80 mg Oral Q6H PRN Phan Vazquez, DO   sucralfate 1 g Oral BID [facility]   zolpidem 5 mg Oral HS PRN [facility] Ruzi     Continuous Infusions:    PRN Meds:   acetaminophen    ALPRAZolam po x 1  And x 1 10/25    bisacodyl    hydrALAZINE    perflutren lipid microsphere    Simethicone x 1 and x 1 10/25    zolpidem    Abnormal Labs/Diagnostic Results:     Age/Sex: 70 y o  male  remains quite weak and needs 2 person assist just to obtain upright status in bed without falling off     Assessment/Plan:   · CVA, right upper and lower extremity weakness new in patient with known history of multiple sclerosis but MRI showing single restricted diffusion area in left corona radiata consistent with small vessel lacunar infarct which is acute  aspirin discontinued in favor of Plavix 75 mg p o  Daily atorvastatin increased from 20 to 80 mg daily, 2D echocardiogram pending CTA was unremarkable for hemodynamic significant stenosis API study showed therapeutic response had been on 325 aspirin as outpatient  · Right-sided hay paresis new from recent stroke which had been his dominant side in relation to his history of multiple sclerosis with neurogenic bladder and suprapubic catheter also per PT evaluation he has significantly increased risk of falls and weakness as result of acute CVA and will require acute rehab   · Type 2 diabetes with neuropathy continues on metformin here 500 mg b i d  And sliding scale coverage  · Multiple sclerosis suprapubic catheter and baclofen 4 spasticity, gabapentin for neuropathy  · Hyperlipidemia  and may setting of acute CVA warrants maximal statin drug therapy at 80 mg and atorvastatin  · UTI with more than 100,000 gram-negative rods growing in urine however patient has suprapubic catheter culture results grew Morganella and Providencia multi drug-resistant would not treat as patient is not septic presume to be colonized  · Hypertension continue losartan and furosemide 20 mg b i d        10/25/2018  98 6 - 86 - 18   141/59  RA 96%  Tylenol x 1 for Right Hip pain    Discharge Plan: STR  Milan Dailey      Thank you,  [facility]  Utilization Review Department  Phone: [phone]; Fax [phone]  ATTENTION: Please call with any questions or concerns to [phone]  and carefully follow the prompts so that you are directed to the right person  Send all requests for admission clinical reviews, approved or denied determinations and any other requests to fax [phone]   All voicemails are confidential

## 2018-10-25 NOTE — OCCUPATIONAL THERAPY NOTE
[address] Progress Note     Patient Name: Grace Daley  KPNYX'F Date: 10/25/2018  Problem List  Patient Active Problem List   Diagnosis    Urinary tract infection    Cellulitis    Diabetic neuropathy ([place] [address] )    Depression    Cervical spinal stenosis    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HCC)    Benign colon polyp    Benign prostatic hyperplasia    Chronic suprapubic catheter ([place] [address] )    Dyslipidemia    Esophageal reflux    Fatty liver    Generalized anxiety disorder    Glaucoma    Hyperlipidemia    Hypertension    Multiple sclerosis ([place] [address] )    Neurogenic bladder    Obstructive sleep apnea    Thyroid nodule    Type 2 diabetes mellitus ([place] [address] )    Urinary retention    CVA (cerebral vascular accident) ([place] [address] )           10/25/18 1241   Restrictions/Precautions   Weight Bearing Precautions Per Order No   Other Precautions Contact/isolation; Fall Risk;Multiple lines;Visual impairment  (R hemiparesis)   Pain Assessment   Pain Assessment No/denies pain   Pain Score No Pain   ADL   Where Assessed Chair   Eating Assistance 4  Minimal Assistance   Eating Deficit Setup;Supervision/safety;Verbal cueing; Increased time to complete;Bringing food to mouth assist   Eating Comments support for R UE at elbow   Grooming Assistance 4  Minimal Assistance   Grooming Deficit Setup;Verbal cueing;Supervision/safety; Increased time to complete   Grooming Comments improvement on  on washcloth noted   UB Dressing Assistance 4  Minimal Assistance   UB Dressing Deficit Setup;Verbal cueing;Supervision/safety; Increased time to complete; Thread RUE   UB Dressing Comments increased time to complete   LB Dressing Assistance 2  Maximal Assistance   LB Dressing Deficit Setup;Steadying;Verbal cueing;Supervision/safety; Increased time to complete; Don/doff L sock; Don/doff R sock; Don/doff R shoe;Don/doff L shoe   LB Dressing Comments due to impaired functional reach, sitting balance and truck control OT donned socks and sneakers Toileting Assistance  2  Maximal Assistance   Toileting Deficit Setup;Steadying;Verbal cueing;Supervison/safety; Increased time to complete;Clothing management up;Perineal hygiene;Clothing management down   Toileting Comments MAX assist steadying assist and assist for hygiene   Functional Standing Tolerance   Time 3 minutes   Activity during toileting hygiene w/ MOD assist steadying w/ RW   Bed Mobility   Supine to Sit 2  Maximal assistance   Additional items Assist x 2; Increased time required;Verbal cues;LE management; Bedrails;HOB elevated   Additional Comments increased time to complete; pt w/ improvements in sitting balance; however at time w/ posterior lean, requires cues at times for upright posture   Transfers   Sit to Stand 2  Maximal assistance   Additional items Assist x 2; Increased time required;Verbal cues;Armrests   Stand to Sit 2  Maximal assistance   Additional items Assist x 2; Increased time required;Verbal cues;Armrests   Additional Comments VCs for hand placement and safety; assist to guide hips into recliner   Functional Mobility   Functional Mobility 2  Maximal assistance   Additional Comments assist x2 w/ assist to guide walker and physical assist to move LEs   Additional items Rolling walker   Therapeutic Exercise - ROM   UE-ROM Yes   ROM- Right Upper Extremities   R Shoulder AAROM; Flexion; Extension;Prolonged stretch;Horizontal ABduction;ABduction   R Elbow AROM;Elbow flexion;Elbow extension   R Wrist AROM; Wrist flexion;Wrist extension  (AAROM forearm pronation/supination 2 sets x10 reps)   R Weight/Reps/Sets 2 sets x15 reps   RUE ROM Comment towel slides R UE w/ assist; increased time to complete tasks   ROM - Left Upper Extremities    L Shoulder AROM; Flexion; Extension   L Elbow AROM;Elbow flexion;Elbow extension   L Weight/Reps/Sets 2 sets x 15 reps   LUE ROM Comment tolerated well   Coordination   Gross Motor improvements noted w/ grasp   Fine Motor increased time for finger to nose coordination   Cognition   Overall Cognitive Status WFL   Arousal/Participation Alert; Cooperative   Attention Attends with cues to redirect   Orientation Level Oriented X4   Memory Within functional limits   Following Commands Follows one step commands without difficulty   Comments requires repeated directions at times for multi step commands   Additional Activities   Additional Activities (functional reaching tasks and folding towels)   Additional Activities Comments decreased grasp and control w/ transitioning items from R hand   Activity Tolerance   Activity Tolerance Patient limited by fatigue;Patient tolerated treatment well   Medical Staff Made Aware appropriate to see per RNMame   Assessment   Assessment Pt seen for skilled OT session focused on bed mobility, functional transfers and mobilty, toileting, functional reaching tasks and R UE AROM/ROM exercises  Pt supine in bed upon arrival  Pt w/ Max assist x2 supine>sit bed mobility w/ increased time  Pt seated EOB and required MAX assist to don socks and shoes while EOB due to impaired functional reach, impaired trunk control  Pt w/ MAX assist x2 sit>stand from bed w/ elevated height and increased time to complete, hand positioning  Pt w/ MAX assist x2 functional mobility w/ RW w/ assist of 3rd for positioning and moving R/L LE during mobility to bedside chair  Pt w/ MAX assist x2 stand>sit w/ assist to guide hips into chair  Pt reports having a bowel movement while seated in chair  Pt w/ MAX assist x2 sit>stand transfer  Pt w/ MOD assist steadying in stance w/ MAX assist hygiene cleanup after bowel movement  Pt w/ MIN assist to don hospital gown and MIN assist to complete grooming task of washing face w/ improvement in control of R UE noted  Pt completed a functional reach task w/ R UE and transferring items to L and reaching in variety of planes to transfer object to therapist; increased time noted and decreased grasp at times   Pt w/ improvements noted in R UE shoulder flexion at 100* AROM  Pt w/ improvement in finger to nose coordination w/ increased time to complete  Pt completed R UE exercises seen above w/ end range stretching  Pt completed towel slides w/ R UE to increase AROM for ADLs  Pt seated in bedside recliner at end of session w/ all needs met  Pt continues to be limited due to decreased strength and endurance, impaired balance, impaired core strength, decreased R UE strength, decreased R UE coordination and R grasp/control w/ activities all causing a decline in ADLs, functional transfers and mobility  Recommend short term rehab when medically stable  Pt is motivated to participate therapy and goal is to regain independence and go home  Will continue to follow to address OT POC  Plan   Treatment Interventions ADL retraining;UE strengthening/ROM; Endurance training;Cognitive reorientation;Patient/family training;Equipment evaluation/education; Compensatory technique education; Activityengagement; Energy conservation   Goal Expiration Date 11/06/18   Treatment Day 3   OT Frequency 3-5x/wk   Recommendation   Recommendation Physiatry Consult   OT Discharge Recommendation Short Term Rehab   OT - OK to Discharge (STR when medically stable)   Barthel Index   Feeding 5   Bathing 0   Grooming Score 5   Dressing Score 5   Bladder Score 0   Bowels Score 5   Toilet Use Score 5   Transfers (Bed/Chair) Score 5   Mobility (Level Surface) Score 0   Stairs Score 0   Barthel Index Score 30   Modified Hancock Scale   Modified Wilder Scale 4     Documentation completed by: Sheeba Giraldo MS, OTR/L

## 2018-10-25 NOTE — SOCIAL WORK
Met with pt to completed assessment and explained role  Pt stated is PCP is now with [facility]  Pt lives in [place] with his brother and sister in a 1 story house with 2 + 1 step to enter  Pt stated his brother and sister live together, but do no provide care to him  Pt was independent with ADLs and pt uses 4 arm crutches in the house, w/c out in the community and pt propels himself  Also depending how he feels he uses RW  Family does the driving  DME:  RW, 4 arm cruthces, w/c, hospital bed, BSC, and walk in shower with bars and seat  Pt is not open with any VNA, but has [facility] staff that come out like RN or MD  Pt uses mail order by [employer]  Pt has no hx of D&A, but has hx of depression  Pt has never had an inpt [address] stay  Pt has a hx of MS and was diagnosis at age 11years old  Pt stated he was able to work and drive a car  Discuss PASRR with Jelena with [facility] ([phone]) and pt would be a negative screen  Pt does not have POA/Living Will and did not want any information   is Santi Polanco [phone]  Pt will need transportation set up and [facility]  PT is recommending a short stay rehab  Informed him [facility] has contacts with [facility] [address]  Pt would be agreeable to [facility]  Mónica Washington is requesting PASRR  This was completed and fax to them for determination

## 2018-10-25 NOTE — PLAN OF CARE
Problem: OCCUPATIONAL THERAPY ADULT  Goal: Performs self-care activities at highest level of function for planned discharge setting  See evaluation for individualized goals  Treatment Interventions: ADL retraining, Functional transfer training, UE strengthening/ROM, Endurance training, Cognitive reorientation, Patient/family training, Visual perceptual retraining, Neuromuscular reeducation, Equipment evaluation/education, Compensatory technique education, Energy conservation, Activityengagement, Fine motor coordination activities, Continued evaluation          See flowsheet documentation for full assessment, interventions and recommendations  Limitation: Decreased ADL status, Decreased Safe judgement during ADL, Decreased UE strength, Decreased cognition, Decreased endurance, Decreased self-care trans, Decreased high-level ADLs, Decreased sensation, Decreased UE ROM, Decreased fine motor control, Visual deficit (R UE hemiparesis)  Prognosis: Good  Assessment: Pt seen for skilled OT session focused on bed mobility, functional transfers and mobilty, toileting, functional reaching tasks and R UE AROM/ROM exercises  Pt supine in bed upon arrival  Pt w/ Max assist x2 supine>sit bed mobility w/ increased time  Pt seated EOB and required MAX assist to don socks and shoes while EOB due to impaired functional reach, impaired trunk control  Pt w/ MAX assist x2 sit>stand from bed w/ elevated height and increased time to complete, hand positioning  Pt w/ MAX assist x2 functional mobility w/ RW w/ assist of 3rd for positioning and moving R/L LE during mobility to bedside chair  Pt w/ MAX assist x2 stand>sit w/ assist to guide hips into chair  Pt reports having a bowel movement while seated in chair  Pt w/ MAX assist x2 sit>stand transfer  Pt w/ MOD assist steadying in stance w/ MAX assist hygiene cleanup after bowel movement   Pt w/ MIN assist to don hospital gown and MIN assist to complete grooming task of washing face w/ improvement in control of R UE noted  Pt completed a functional reach task w/ R UE and transferring items to L and reaching in variety of planes to transfer object to therapist; increased time noted and decreased grasp at times  Pt w/ improvements noted in R UE shoulder flexion at 100* AROM  Pt w/ improvement in finger to nose coordination w/ increased time to complete  Pt completed R UE exercises seen above w/ end range stretching  Pt completed towel slides w/ R UE to increase AROM for ADLs  Pt seated in bedside recliner at end of session w/ all needs met  Pt continues to be limited due to decreased strength and endurance, impaired balance, impaired core strength, decreased R UE strength, decreased R UE coordination and R grasp/control w/ activities all causing a decline in ADLs, functional transfers and mobility  Recommend short term rehab when medically stable  Pt is motivated to participate therapy and goal is to regain independence and go home  Will continue to follow to address OT POC    Recommendation: Physiatry Consult  OT Discharge Recommendation: Short Term Rehab  OT - OK to Discharge:  (STR when medically stable)      Comments: Julia Oakes MS, OTR/L

## 2018-10-26 VITALS
WEIGHT: 182.98 LBS | HEIGHT: 65 IN | OXYGEN SATURATION: 94 % | HEART RATE: 94 BPM | DIASTOLIC BLOOD PRESSURE: 65 MMHG | RESPIRATION RATE: 18 BRPM | BODY MASS INDEX: 30.49 KG/M2 | TEMPERATURE: 98.7 F | SYSTOLIC BLOOD PRESSURE: 137 MMHG

## 2018-10-26 LAB
ALBUMIN SERPL BCP-MCNC: 3.2 G/DL (ref 3.5–5)
ALP SERPL-CCNC: 89 U/L (ref 46–116)
ALT SERPL W P-5'-P-CCNC: 50 U/L (ref 12–78)
ANION GAP SERPL CALCULATED.3IONS-SCNC: 12 MMOL/L (ref 4–13)
AST SERPL W P-5'-P-CCNC: 40 U/L (ref 5–45)
BILIRUB SERPL-MCNC: 0.41 MG/DL (ref 0.2–1)
BUN SERPL-MCNC: 12 MG/DL (ref 5–25)
CALCIUM SERPL-MCNC: 9 MG/DL (ref 8.3–10.1)
CHLORIDE SERPL-SCNC: 94 MMOL/L (ref 100–108)
CO2 SERPL-SCNC: 24 MMOL/L (ref 21–32)
CREAT SERPL-MCNC: 0.9 MG/DL (ref 0.6–1.3)
GFR SERPL CREATININE-BSD FRML MDRD: 87 ML/MIN/1.73SQ M
GLUCOSE SERPL-MCNC: 187 MG/DL (ref 65–140)
POTASSIUM SERPL-SCNC: 3.4 MMOL/L (ref 3.5–5.3)
PROT SERPL-MCNC: 7.9 G/DL (ref 6.4–8.2)
SODIUM SERPL-SCNC: 130 MMOL/L (ref 136–145)

## 2018-10-26 PROCEDURE — 80053 COMPREHEN METABOLIC PANEL: CPT | Performed by: INTERNAL MEDICINE

## 2018-10-26 PROCEDURE — 99239 HOSP IP/OBS DSCHRG MGMT >30: CPT | Performed by: INTERNAL MEDICINE

## 2018-10-26 RX ORDER — LANOLIN ALCOHOL/MO/W.PET/CERES
6 CREAM (GRAM) TOPICAL
Refills: 0
Start: 2018-10-26 | End: 2019-05-02

## 2018-10-26 RX ORDER — CLOPIDOGREL BISULFATE 75 MG/1
75 TABLET ORAL DAILY
Refills: 0
Start: 2018-10-27

## 2018-10-26 RX ORDER — ONDANSETRON 2 MG/ML
4 INJECTION INTRAMUSCULAR; INTRAVENOUS EVERY 4 HOURS PRN
Status: DISCONTINUED | OUTPATIENT
Start: 2018-10-26 | End: 2018-10-26 | Stop reason: HOSPADM

## 2018-10-26 RX ORDER — POTASSIUM CHLORIDE 750 MG/1
10 TABLET, EXTENDED RELEASE ORAL DAILY
Refills: 0
Start: 2018-10-27 | End: 2021-07-21

## 2018-10-26 RX ORDER — SUCRALFATE 1 G/1
1 TABLET ORAL 2 TIMES DAILY
Refills: 0
Start: 2018-10-26 | End: 2021-06-02 | Stop reason: ALTCHOICE

## 2018-10-26 RX ORDER — DOCUSATE SODIUM 100 MG/1
100 CAPSULE, LIQUID FILLED ORAL 2 TIMES DAILY
Qty: 10 CAPSULE | Refills: 0 | Status: SHIPPED | OUTPATIENT
Start: 2018-10-26 | End: 2019-05-02

## 2018-10-26 RX ADMIN — POTASSIUM CHLORIDE 10 MEQ: 750 TABLET, EXTENDED RELEASE ORAL at 09:35

## 2018-10-26 RX ADMIN — HEPARIN SODIUM 5000 UNITS: 5000 INJECTION INTRAVENOUS; SUBCUTANEOUS at 05:33

## 2018-10-26 RX ADMIN — GABAPENTIN 300 MG: 300 CAPSULE ORAL at 09:35

## 2018-10-26 RX ADMIN — CLOPIDOGREL 75 MG: 75 TABLET, FILM COATED ORAL at 09:35

## 2018-10-26 RX ADMIN — SIMETHICONE CHEW TAB 80 MG 80 MG: 80 TABLET ORAL at 01:38

## 2018-10-26 RX ADMIN — ONDANSETRON 4 MG: 2 INJECTION INTRAMUSCULAR; INTRAVENOUS at 02:16

## 2018-10-26 RX ADMIN — AMLODIPINE BESYLATE 10 MG: 10 TABLET ORAL at 09:36

## 2018-10-26 RX ADMIN — ASPIRIN 324 MG: 81 TABLET, COATED ORAL at 09:36

## 2018-10-26 RX ADMIN — FUROSEMIDE 20 MG: 20 TABLET ORAL at 09:36

## 2018-10-26 RX ADMIN — PANTOPRAZOLE SODIUM 40 MG: 40 TABLET, DELAYED RELEASE ORAL at 05:33

## 2018-10-26 RX ADMIN — LOSARTAN POTASSIUM 50 MG: 50 TABLET ORAL at 09:36

## 2018-10-26 RX ADMIN — BACLOFEN 5 MG: 10 TABLET ORAL at 09:35

## 2018-10-26 RX ADMIN — METFORMIN HYDROCHLORIDE 500 MG: 500 TABLET, FILM COATED ORAL at 09:36

## 2018-10-26 RX ADMIN — ONDANSETRON 4 MG: 2 INJECTION INTRAMUSCULAR; INTRAVENOUS at 09:37

## 2018-10-26 RX ADMIN — SUCRALFATE 1 G: 1 TABLET ORAL at 09:36

## 2018-10-26 RX ADMIN — PROPRANOLOL HYDROCHLORIDE 60 MG: 60 CAPSULE, EXTENDED RELEASE ORAL at 09:37

## 2018-10-26 NOTE — PLAN OF CARE
Problem: DISCHARGE PLANNING - CARE MANAGEMENT  Goal: Discharge to post-acute care or home with appropriate resources  INTERVENTIONS:  - Conduct assessment to determine patient/family and health care team treatment goals, and need for post-acute services based on payer coverage, community resources, and patient preferences, and barriers to discharge  - Address psychosocial, clinical, and financial barriers to discharge as identified in assessment in conjunction with the patient/family and health care team  - Arrange appropriate level of post-acute services according to patients   needs and preference and payer coverage in collaboration with the physician and health care team  - Communicate with and update the patient/family, physician, and health care team regarding progress on the discharge plan  - Arrange appropriate transportation to post-acute venues   Outcome: Adequate for Discharge  The plan is [facility] [address] [facility]

## 2018-10-26 NOTE — SOCIAL WORK
MD planning on discharging pt today to [facility]) [address] and they can accept pt today  TC to [facility] and [employer] will [facility] [facility] and [facility]  RN will fax discharge instruction sheets to [facility]  PASRR completed and fax to [facility]  Called two times to [facility] to get transport set up and [facility] still has no called with Irwin Burton for w/c van  US aware of chart copy

## 2018-10-26 NOTE — DISCHARGE SUMMARY
Discharge Summary - [facility] Internal Medicine    Patient Information: Lupis Thomas 70 y o  male MRN: [id number]  Unit/Bed#: E4 -01 Encounter: [id number]    Discharging Physician / Practitioner: Barrett Zhao MD  PCP: Aidan Zaman DO  Admission Date: 10/21/2018  Discharge Date: 10/26/18    Reason for Admission:  Right lower extremity weakness    Discharge Diagnoses:  Acute left posterior corona radiata CVA    Principal Problem:    CVA (cerebral vascular accident) [facility])  Active Problems:    Diabetic neuropathy ([facility] [address] )    Depression    Benign prostatic hyperplasia    Chronic suprapubic catheter ([facility] [address] )    Dyslipidemia    Hyperlipidemia    Hypertension    Multiple sclerosis ([facility] )    Neurogenic bladder    Obstructive sleep apnea    Type 2 diabetes mellitus ([facility] [address] )  Resolved Problems:    * No resolved hospital problems  *      Consultations During Hospital Stay:  · Neurology    Procedures Performed:     X-ray Chest 1 View Portable    Result Date: 10/22/2018  Narrative: CHEST INDICATION:   stroke  COMPARISON:  Chest radiographs February 18, 2015 EXAM PERFORMED/VIEWS:  XR CHEST PORTABLE  AP semierect portable FINDINGS: Heart shadow appears unremarkable  Atherosclerotic vascular calcifications are noted  The lungs are clear  No pneumothorax or pleural effusion  Osseous structures appear within normal limits for patient age  Degenerative changes bilateral shoulders  Impression: No acute cardiopulmonary disease  Workstation performed: [id number]     Ct Stroke Alert Brain    Result Date: 10/21/2018  Narrative: CT BRAIN - STROKE ALERT PROTOCOL INDICATION:   Stroke  COMPARISON:  CT of the head on March 8, 2017  TECHNIQUE:  CT examination of the brain was performed  In addition to axial images, coronal reformatted images were created and submitted for interpretation  Radiation dose length product (DLP) for this visit:  1075 mGy-cm     This examination, like all CT scans performed in the [facility], was performed utilizing techniques to minimize radiation dose exposure, including the use of iterative reconstruction and automated exposure control  IMAGE QUALITY:  Diagnostic  FINDINGS:  PARENCHYMA:  Decreased attenuation is noted in the supratentorial white matter demonstrating an appearance most consistent with mild microangiopathic change  No intracranial mass, mass effect or midline shift  No acute intracranial hemorrhage  No CT signs of acute infarction  Aneurysm coil is seen within the basilar tip region similar to prior exam  VENTRICLES AND EXTRA-AXIAL SPACES:  The ventricles are stable in size and configuration  VISUALIZED ORBITS AND PARANASAL SINUSES:  Unremarkable  CALVARIUM AND EXTRACRANIAL SOFT TISSUES:   Normal      Impression: 1  No acute intracranial hemorrhage  2   Chronic small vessel ischemic changes  3   Stable basilar tip region aneurysm coil  4   If there is continued clinical concern for acute infarct, further evaluation with MRI may be obtained which is more sensitive  Findings were directly discussed with Presley Kirby on 10/21/2018 9:18 PM  Workstation performed: [id number]     Mri Brain Ms Wo And W Contrast    Result Date: 10/22/2018  Narrative: MRI BRAIN WITH AND WITHOUT CONTRAST INDICATION:  as per neurology recommendations  Demyelinating disease  COMPARISON:  Previous study from September 19, 2016, December 11, 2014 TECHNIQUE:  Sagittal T1, axial T2, axial FLAIR, axial T1  Axial diffusion-weighted imaging  Axial [place], Sagittal FLAIR CUBE  Axial [id number]  Sagittal BRAVO post contrast  IV Contrast:  8 mL of gadobutrol injection (MULTI-DOSE) IMAGE QUALITY:  Diagnostic  FINDINGS: BRAIN PARENCHYMA: There is a focal area of foot diffusion restriction in the left deep periventricular region involving the posterior aspect of the corona radiate  This is T2 hyperintense and hypointense on the FLAIR sequences    There is no enhancement noted on the post contrast images  Moderate to periventricular and white matter T2 hyperintensities are noted  These are unchanged as the previous study  There is no T2 hyperintense lesion seen within the infratentorial compartment, cerebellar peduncles and in the visualized the cervical cord  Postcontrast imaging of the brain demonstrates no abnormal enhancement  There is no discrete mass, mass effect or midline shift  There is no intracranial hemorrhage  There is no evidence of acute infarction  VENTRICLES:  Normal  SELLA AND PITUITARY GLAND:  Normal  ORBITS:  Normal  PARANASAL SINUSES:  Normal  VASCULATURE:  Evaluation of the major intracranial vasculature demonstrates appropriate flow voids  CALVARIUM AND SKULL BASE:  Normal  EXTRACRANIAL SOFT TISSUES:  Normal      Impression: There is a 1 2 cm focal area of diffusion restriction adjacent to the posterior body of the left lateral ventricle in the region of the posterior corona radiata without associated contrast enhancement compatible with acute lacunar infarct Moderate periventricular and white matter T2 hyperintensities noted, unchanged from the previous study of September 19, 2016 Nonenhancing focus to suggest active demyelination No acute hemorrhage seen  I personally discussed this study with Mariza Cooper on 10/22/2018 at 12:20 PM   Workstation performed: [id number]     Cta Stroke Alert (head/neck)    Result Date: 10/21/2018  Narrative: CTA NECK AND BRAIN WITH CONTRAST INDICATION: Focal neuro deficit, new, fixed or worsening, <6 hours COMPARISON:   MRA of the head on 11/24/2017  CT of the head on March 8, 2017  TECHNIQUE: Post contrast imaging was performed after administration of iodinated contrast through the neck and brain  Post contrast axial 0 625 mm images timed to opacify the arterial system  3D rendering was performed on an independent workstation  MIP reconstructions performed  Coronal reconstructions were performed of the noncontrast portion of the brain  Radiation dose length product (DLP) for this visit:  694 mGy-cm   This examination, like all CT scans performed in the [facility], was performed utilizing techniques to minimize radiation dose exposure, including the use of iterative reconstruction and automated exposure control  IV Contrast:  90 mL of iohexol (OMNIPAQUE)  IMAGE QUALITY:   Diagnostic FINDINGS: CERVICAL VASCULATURE AORTIC ARCH AND GREAT VESSELS:  Moderate ahteroschlerotic disease of the arch and great vessels  Mild narrowing at the origin of the left common carotid artery  RIGHT VERTEBRAL ARTERY CERVICAL SEGMENT:  Mild narrowing at the origin  The vessel is normal in caliber throughout the neck  Scattered atherosclerotic calcifications  LEFT VERTEBRAL ARTERY CERVICAL SEGMENT:  Mild narrowing just distal to the origin  The vessel is normal in caliber throughout the neck  Scattered atherosclerotic ossifications  RIGHT EXTRACRANIAL CAROTID SEGMENT:  Moderate atherosclerotic disease of the bifurcation  There is mild narrowing of the right external carotid artery at the origin  There is mild narrowing of the right internal carotid artery at the origin  LEFT EXTRACRANIAL CAROTID SEGMENT:  Moderate atherosclerotic disease of the bifurcation  Approximately 50% narrowing at the origin of the left internal carotid artery  NASCET criteria was used to determine the degree of internal carotid artery diameter stenosis  INTRACRANIAL VASCULATURE INTERNAL CAROTID ARTERIES:  Mild narrowing due to calcification of the cavernous to supraclinoid internal carotid arteries bilaterally  Normal ophthalmic artery origins  Normal ICA terminus  ANTERIOR CIRCULATION:  Symmetric A1 segments and anterior cerebral arteries with normal enhancement  Normal anterior communicating artery  MIDDLE CEREBRAL ARTERY CIRCULATION:  M1 segments are patent  DISTAL VERTEBRAL ARTERIES:  Normal distal vertebral arteries    Posterior inferior cerebellar artery origins are normal  Normal vertebral basilar junction  BASILAR ARTERY:  Aneurysm coil seen at the basilar tip creating streak artifact limiting evaluation  A hyperdensity likely reflecting coil is also seen within the right P1 segment, stable  The distal P2 segment is patent  POSTERIOR CEREBRAL ARTERIES: Both posterior cerebral arteries arises from the basilar tip  Both arteries demonstrate normal enhancement  The posterior communicating arteries are hypoplastic or absent  DURAL VENOUS SINUSES:  Normal  NON VASCULAR ANATOMY BONY STRUCTURES:  No acute osseous abnormality  Multilevel degenerative changes  SOFT TISSUES OF THE NECK:  Unremarkable  THORACIC INLET:  Unremarkable  Impression: 1  Mild narrowing at the origin of the left common carotid artery, vertebral arteries bilaterally, and right internal carotid artery origins without significant stenosis  2   Approximately 50% narrowing at the origin of the left internal carotid artery  3   Mild narrowing of the cavernous to supraclinoid internal carotid arteries  4   No high-grade proximal stenosis to visualized [place]  5   Stable aneurysm coil seen at the basilar tip which limits evaluation due to streak artifact  Stable hyperdensity also likely reflecting a coil is seen within the right P1 segment which remains patent  Workstation performed: [id number]         Significant Findings:     · 68-year-old male with a known history of multiple sclerosis with suprapubic catheter also past medical history including diabetes mellitus hyperlipidemia Hypertension  Patient presented with right upper extremity and more so right lower extremity weakness for approximately 5 hr prior to presentation  He has always been weak in relation to his multiple sclerosis even as right-sided  Last known well time was 2:00 a m  Prior to arrival here stating that he had tried to get out of chair notice weakness  · Initial CT and CTA were unremarkable    After consultation with the neurology staff he was made stroke alert and admitted  No tPA administered secondary to unclear etiology of symptoms and no acute changes noted on CT of the head neck and MRI eventually noted single restricted diffusion area of the left corona radiata consistent with small vessel lacunar infarct  · API assessment of aspirin sensitivity was appropriate and the patient already been on adult dose aspirin so Plavix added 75 mg p o  Daily  · 2D echocardiogram showed hyperdynamic left ventricle with an EF of 75% no signs of vegetations or thrombi  ·  and setting of acute stroke was placed on maximal statin therapy of atorvastatin 80 mg a day  · Suprapubic catheter would grew out 100,000 gram-negative rods combination of Morganella and Providencia felt to be colonization without evidence of infection the patient does have significant sediment in the Cage return and may warrant imminent change in catheter  Sees Dr Shelli Dash, urology  · Diabetic management included continuation of metformin 500 mg b i d  And sliding scale coverage gabapentin for diabetic neuropathy and neuropathic symptoms baclofen for spasticity  · He has continued right hay paresis with at best 3/5 strength in the right upper extremity 2/5 right lower extremity and after PT/OT evaluation recommendation is for short-term rehab facility and see been accepted at [facility] on this date  · Hypertensive management will continue be amlodipine 10 mg p o   Daily and losartan 50 mg daily, propranolol 60 mg daily    Incidental Findings:   · * hemoglobin A1c 8 4  · Urine drug screen negative  · TSH 3 1    Test Results Pending at Discharge (will require follow up):   ·      Outpatient Tests Requested:  ·     Complications:  Constipation responded to stool softener and Dulcolax suppository    Hospital Course:     Desmond Law is a 70 y o  male patient who originally presented to the hospital on 10/21/2018 due to right-sided weakness  Please see above significant findings for hospital course and treatment plan    Condition at Discharge: good     Discharge Day Visit / Exam:     * Please refer to separate progress for these details *    Discharge instructions/Information to patient and family:   See after visit summary for information provided to patient and family  Provisions for Follow-Up Care:  See after visit summary for information related to follow-up care and any pertinent home health orders  Disposition:     Acute Rehab at  [facility] [address] [facility]:   · Not Applicable to this Patient - Not Applicable to this Patient      Discharge Statement:  I spent *45 minutes discharging the patient  This time was spent on the day of discharge  I had direct contact with the patient on the day of discharge  Greater than 50% of the total time was spent examining patient, answering all patient questions, arranging and discussing plan of care with patient as well as directly providing post-discharge instructions  Additional time then spent on discharge activities  Discharge Medications:  See after visit summary for reconciled discharge medications provided to patient and family  ** Please Note: Dragon 360 Dictation voice to text software may have been used in the creation of this document   **

## 2018-10-26 NOTE — SOCIAL WORK
TC to [facility] for the third time and [employer] has not called to Mark jiménez/monica Jurado  Left message with Asia Parker MSW with [employer] that need them to auth w/monica Jurado before [facility] will schedule

## 2018-10-26 NOTE — NURSING NOTE
Pt has 2 occasions of loose brown stool since 2300  C/o heartburn and thought it was related to a hamburger he had eaten earlier in the night  Administered PRN simethicone with no relief to the pt  Pt began vomiting on the bed around 0200  No PRN orders for anti-emetic  Paged RRNP with new orders to give IV Zofran  Pt had a third episode of loose stool during emesis episode  After administering Zofran, pt states he feels less nauseous and no longer feels his stomach is upset  Will continue to closely monitor

## 2018-10-26 NOTE — UTILIZATION REVIEW
Continued Stay Review    Date: 10/25/2018    Vital Signs: /65 (BP Location: Left arm)   Pulse 94   Temp 98 7 °F (37 1 °C) (Tympanic)   Resp 18   Ht 5' 5" (1 651 m)   Wt 83 kg (182 lb 15 7 oz)   SpO2 94%   BMI 30 45 kg/m²     Medications:   Scheduled Meds:   Current Facility-Administered Medications:  acetaminophen 650 mg Oral Q6H PRN Millie Toro MD   ALPRAZolam 0 25 mg Oral TID PRN Millie Toro MD   amLODIPine 10 mg Oral Daily [facility] Jesse   aspirin 324 mg Oral Daily Obed Young DO   atorvastatin 80 mg Oral HS Kenzie Jung PA-C   baclofen 5 mg Oral BID [facility] Jesse   bisacodyl 10 mg Rectal Daily PRN Millie Toro MD   clopidogrel 75 mg Oral Daily Kenzie Jung PA-C   docusate sodium 100 mg Oral BID [facility] Damaso   furosemide 20 mg Oral BID [facility]   gabapentin 300 mg Oral TID [facility] Damaso   heparin (porcine) 5,000 Units Subcutaneous Q8H [address] [facility]   hydrALAZINE 10 mg Intravenous Q6H PRN [facility] Damaso   losartan 50 mg Oral Daily [facility] Damaso   melatonin 6 mg Oral HS Kenzie Jung PA-C   metFORMIN 500 mg Oral BID With Meals [facility] Damaso   ondansetron 4 mg Intravenous Q4H PRN Annett Read Spatzer, CRNP   pantoprazole 40 mg Oral BID AC [facility] Damsao   perflutren lipid microsphere 1 mL/min Intravenous Once in imaging Obed Young DO   potassium chloride 10 mEq Oral Daily [facility] Damaso   propranolol 60 mg Oral Daily [facility]   simethicone 80 mg Oral Q6H PRN Phan Vazquez DO   sucralfate 1 g Oral BID [facility] Damaso   zolpidem 5 mg Oral HS PRN [facility] Jesse     Continuous Infusions:    PRN Meds:   Acetaminophen x 1  10/25    ALPRAZolam x 2  10/25    bisacodyl    hydrALAZINE    ondansetron    perflutren lipid microsphere    Simethicone x 2  10/25    zolpidem    Abnormal Labs/Diagnostic Results:     Age/Sex: 70 y o  male     Assessment/Plan:  69 yo male with CVA - right upper and lower extremity weakness new in patient with known history of multiple sclerosis   · Right-sided hay paresis new from recent stroke which had been his dominant side in relation to his history of multiple sclerosis with neurogenic bladder and suprapubic catheter also per PT evaluation he has significantly increased risk of falls and weakness as result of acute CVA and will require acute rehab   · Type 2 diabetes with neuropathy continues on metformin here 500 mg b i d  And sliding scale coverage  · Multiple sclerosis suprapubic catheter and baclofen 4 spasticity, gabapentin for neuropathy  Extremities: 2/5 strength right upper extremity summoned improvement in right lower extremity to 3 to 4 out of 5 strength very unsteady gait and needs 2 person assist    Discharge Plan:     10/26: For DISCHARGE to [facility] today via [facility]  98 7 - 94 - 18   137/65  RA 94%      Thank you,  [address]  Utilization Review Department  Phone: [phone]; Fax [phone]  ATTENTION: Please call with any questions or concerns to [phone]  and carefully follow the prompts so that you are directed to the right person  Send all requests for admission clinical reviews, approved or denied determinations and any other requests to fax [phone]   All voicemails are confidential

## 2018-11-01 NOTE — TELEPHONE ENCOUNTER
Patient currently staying at [facility] [id number]  Rescheduled 1 year appt for 12/3/18@ 3:15  [facility] will call back if there is a problem because their  was current;y out for the day, left my EXT to call me back if so  Also told them that his MRA is scheduled for 11/23/18 @ [facility]

## 2018-11-13 ENCOUNTER — TELEPHONE (OUTPATIENT)
Dept: NEUROLOGY | Facility: CLINIC | Age: 68
End: 2018-11-13

## 2018-11-13 NOTE — TELEPHONE ENCOUNTER
Lai Agent returned my call  Explained that the patient had told me his insurance was restricting him from being seen by our office  Lai Agent aware insurance is [employer], okay to schedule  Called patient to make in appointment with Laura Sun in [facility]  He did not answer  Left a message on voicemail box for call back

## 2018-11-13 NOTE — TELEPHONE ENCOUNTER
Alondra Ballard MS Navigator regarding scheduling patient for hospital follow up  Patient's last office visit was with Miguel Salazar 01/12 regarding his MS  Miguel Salazar agreeable to seeing patient for hospital follow up  Called Satinder Vinson to inquire about insurance information required from [employer]  She is not available, left a message for call back

## 2018-11-13 NOTE — TELEPHONE ENCOUNTER
7 Day Post Discharge Stroke Follow-Up Call Questionnaire    The purpose of this phone call is to assess patient's general wellbeing or for any assistance needed with follow-up care  According to chart, patient was discharged to [facility] in [place]  Called facility at [phone]  Patient was transferred to a hospital on 11/05  Unit clerk not able to see what hospital he was sent to  Called sister Orville Johnson [phone]  Patient answer  He is fully oriented an states he did not go to a hospital after discharge from [facility]  Since being home, patient has not experienced any new or worsening stroke symptoms  He continues to struggle with his right side  Right hand has weak , right leg weak but improving  Has full sensation  Patient lives with his brother and sister and has some difficulty with performing his ADLs and requires assistance at times  He uses a wheelchair most of the time and a walker occasionally with the assistance of 1-2, transferring with assistance as well  Patient has [employer] and experienced difficulties covering his appointments with Luis  Patient was not instructed in AVS to have any follow ups  MRA scheduled 11/23, office visit scheduled with Dr Darcie Miles 12/03, and urology 12/24  Attends outpatient PT and OT 3 times a week  Goes to [facility] during therapy [phone] and they fill his weekly pill box  He does not know his medications, he states he takes all medications that are provided from the center as prescribed with no missed doses or side effects  Patient not sure of he got his stroke binder while inpatient  "I have so much that I haven't looked through yet"  I reeducated patient's risk factors  He does not monitor his blood pressure or blood sugar  His appetite is good and follows a diabetic diet  Patient verbalizes understanding of stroke risk factors and symptoms

## 2018-11-14 NOTE — TELEPHONE ENCOUNTER
Called to schedule appointment, patient did not answer  Left a message on voicemail box for call back

## 2018-11-23 ENCOUNTER — HOSPITAL ENCOUNTER (OUTPATIENT)
Dept: MRI IMAGING | Facility: HOSPITAL | Age: 68
Discharge: HOME | End: 2018-11-23
Payer: MEDICARE

## 2018-11-23 DIAGNOSIS — I72.9 ANEURYSM (HCC): ICD-10-CM

## 2018-11-23 DIAGNOSIS — R29.898 OTHER SYMPTOMS AND SIGNS INVOLVING THE MUSCULOSKELETAL SYSTEM: ICD-10-CM

## 2018-11-23 PROCEDURE — A9585 GADOBUTROL INJECTION: HCPCS | Performed by: PHYSICIAN ASSISTANT

## 2018-11-23 PROCEDURE — 70546 MR ANGIOGRAPH HEAD W/O&W/DYE: CPT

## 2018-11-23 RX ADMIN — GADOBUTROL 8 ML: 604.72 INJECTION INTRAVENOUS at 12:20

## 2018-11-28 ENCOUNTER — OFFICE VISIT (OUTPATIENT)
Dept: NEUROLOGY | Facility: CLINIC | Age: 68
End: 2018-11-28
Payer: MEDICARE

## 2018-11-28 VITALS
WEIGHT: 185 LBS | HEIGHT: 65 IN | BODY MASS INDEX: 30.82 KG/M2 | SYSTOLIC BLOOD PRESSURE: 122 MMHG | HEART RATE: 83 BPM | DIASTOLIC BLOOD PRESSURE: 56 MMHG

## 2018-11-28 DIAGNOSIS — Z86.73 HISTORY OF CVA (CEREBROVASCULAR ACCIDENT): ICD-10-CM

## 2018-11-28 DIAGNOSIS — I72.5 ANEURYSM OF BASILAR ARTERY (HCC): ICD-10-CM

## 2018-11-28 DIAGNOSIS — G35 MULTIPLE SCLEROSIS (HCC): Primary | ICD-10-CM

## 2018-11-28 PROCEDURE — 99214 OFFICE O/P EST MOD 30 MIN: CPT | Performed by: PHYSICIAN ASSISTANT

## 2018-11-28 RX ORDER — BRIMONIDINE TARTRATE, TIMOLOL MALEATE 2; 5 MG/ML; MG/ML
1 SOLUTION/ DROPS OPHTHALMIC EVERY 12 HOURS SCHEDULED
COMMUNITY
End: 2021-06-02 | Stop reason: ALTCHOICE

## 2018-11-28 NOTE — PROGRESS NOTES
Patient ID: Maggi Gómez is a 70 y o  male  Assessment/Plan:    Multiple sclerosis ([facility] [address] )  Patient with long-standing MS, overall stable  He is not on any IMD therapy  He has progressive gait changes and weakness in the lower extremities and would benefit from PT  He is currently in PT at [facility]  Will continue to monitor him  History of CVA (cerebrovascular accident)  Patient hospitalized in October 2018 due to right upper and lower extremity weakness (worse than baseline)  No tPA was given due to concern this was an MS exacerbation vs CVA  CTA head and neck showed some atherosclerosis, but no flow limiting stenosis or occlusion  MRI brain demonstrated a 1 2 cm focal area of diffusion restriction adjacent to the posterior body of the left lateral ventricle in the region of the posterior corona radiata without associated contrast enhancement compatible with acute lacunar infarct  A1C 8 4  Lipid panel ,   ECHO with EF 75%, no regional wall motion abnormalities  No atrial dilation  Etiology of the new CVA felt to be small vessel in origin in the setting of uncontrolled CV risk factors  He was on ASA 325mg at time of the event and API was in range  Plavix 75mg was added  His Lipitor was increased to 80mg daily    Patient doing well  He is currently in PT and OT at [facility] but reports he has not been getting as much therapy lately  He feels he needs help with fine motor in the right hand  His PCP is now through [facility] apparently  All of his meds come from [facility]  Plan:  Patient currently on ASA 325mg and Plavix 75mg along with Lipitor 80mg for secondary stroke prevention  There is no significant intracranial stenosis that would warrant patient to be on dual anti-platelet therapy long-term  -STOP ASA 325mg  -continue Plavix 75mg daily  -continue Lipitor 80mg daily  -cardiovascular risk factor control per PCP  A1C was uncontrolled at 8 4    PCP to manage  Patient also needs to maintain good control of lipids, blood pressure   -continue PT and OT  He needs work on fine motor in the [place] [address] especially   -follow up in 4 months with vascular neurology attending  -reviewed signs and symptoms of stroke and when to call 911  Patient to call for any questions/concerns, or new or worsening symptoms  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HCC)  S/p 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 in March 2015 by Dr Danay Frankel  He now follows regularly with Dr Darcie Miles, has appt next week  His MRA was somehow ordered under my name, although ordered last year by Dr Darcie Miles  This was completed 11/23/18 and read as stable  He will follow up with Dr Darcie Miles next week so he can review the imaging with him as well  Diagnoses and all orders for this visit:    Multiple sclerosis ([facility] [address] )    History of CVA (cerebrovascular accident)    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[facility] [address] )    Other orders           Subjective:    HPI    71 y/o male presents with brother for neurologic follow up, last seen in January 2018  Patient with PMH of MS diagnosed many years ago, DM and HTN, glaucoma  Patient diagnosed with probable MS in the 1960s  Actually unclear diagnosis from time of presentation to presentation to our center in 2014  Patient recalls at age of 11 having chest pain and then numbness of the left toes tingling up the leg to mid chest around T6 and then down the right side in similar distribution to the right foot  Patient had loss of control of bowel and bladder at that time  He had no ability to walk and was in the hospital for over 6 months  He was only able to ambulate with crutches in his 20s, 30s, 40s, etc  Patient still uses [place] crutches as well as a wheelchair to ambulate  Patient was only ever given ACTH in the past  He was never on IMD meds prior to coming to our center in 2014  He was told he may have had a CVA that affected his walking   Patient with suprapubic catheter placed by Dr Duque Chapman  Labs unremarkable, NMO negative  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  MRI c-spine did not reveal any lesions  MRI t-spine reveals cord lesion from T3-5; no comparison on file  Patient with longstanding dizziness; he has strong family history of brain aneurysms- his sister had 3 aneurysms, 1 niece with 3 aneurysms, 1 niece who passed away from aneurysm and 1 nephew with aneurysm; patient's father with aneurysm in his abdominal area  He had CTA in January 2015, 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  He was seen neuro-surg and underwent 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 in March 2015 by Dr Cale Yang of neuro-surg  Today, patient is mainly following up after a recent hospitalization  He is overdue for his routine follow up, however he was unfortunately hospitalized 10/21/18-10/26/18  Patient arrived via EMS as a prehospital stroke alert due to RUE weakness and worsened RLE weakness (weak at baseline due to [address])  NIHSS 7  He was not given tPA due to concern this was MS exacerbation  CTH negative for acute infarction  CTA head and neck demonstrated mild narrowing at the origin of the left common carotid artery, vertebral arteries bilaterally, and right internal carotid artery origins without significant stenosis  Approximately 50% narrowing at the origin of the left internal carotid artery  Mild narrowing of the cavernous to supraclinoid internal carotid arteries  No high-grade proximal stenosis to visualized [place]  Stable aneurysm coil seen at the basilar tip  Patient was admitted for further workup  He was also started on antibiotics for suspected UTI  MRI brain demonstrated a 1 2 cm focal area of diffusion restriction adjacent to the posterior body of the left lateral ventricle in the region of the posterior corona radiata without associated contrast enhancement compatible with acute lacunar infarct  A1C 8 4  Lipid panel ,   ECHO with EF 75%, no regional wall motion abnormalities  No atrial dilation  This was felt to be small vessel in origin  His ASA was changed to Plavix 75mg and Lipitor was increased from 20mg to 80mg daily  Patient reports he is feeling well, denies any new neuro symptoms at this time  He has been more tired lately  He goes to [facility] and they give him his meds in pre-filled pill boxes and he takes the meds on his own  No missed doses or side effects  He has an [facility]  Somehow, the MRA head was ordered under my name, although it was ordered by   [facility] last year  This was completed 11/23/18 and stable  The following portions of the patient's history were reviewed and updated as appropriate: current medications, past family history, past medical history, past social history, past surgical history and problem list          Objective:    Blood pressure 122/56, pulse 83, height 5' 5" (1 651 m), weight 83 9 kg (185 lb)  Physical Exam   Constitutional: He appears well-developed and well-nourished  HENT:   Head: Normocephalic and atraumatic  Eyes: Pupils are equal, round, and reactive to light  EOM are normal    Cardiovascular: Intact distal pulses  Pulmonary/Chest: Effort normal    Neurological: He is alert  Reflex Scores:       Bicep reflexes are 2+ on the right side and 2+ on the left side  Brachioradialis reflexes are 2+ on the right side and 2+ on the left side  Patellar reflexes are 4+ on the right side and 4+ on the left side  Achilles reflexes are 4+ on the right side and 4+ on the left side  Skin: Skin is warm and dry  Psychiatric: He has a normal mood and affect  His speech is normal        Neurological Exam  Mental Status  Awake and alert  Oriented to person, place, time and situation  Recent and remote memory are intact   Speech is normal  Language is fluent with no aphasia  Attention and concentration are normal     Cranial Nerves  CN II: Visual fields full to confrontation  CN III, IV, VI: Extraocular movements intact bilaterally  Pupils equal round and reactive to light bilaterally  CN V: Facial sensation is normal   CN VII: Full and symmetric facial movement  CN VIII: Hearing is normal   CN IX, X: Palate elevates symmetrically  Normal gag reflex  CN XI: Shoulder shrug strength is normal   CN XII: Tongue midline without atrophy or fasciculations  Motor   Normal muscle tone  Right                     Left   Shoulder adduction               5-                          5  Elbow flexion                         5-                          5  Elbow extension                    5-                          5  Hip flexion                              1                          1  Dorsiflexion                            0                          0    Sensory  Decreased vibratory sensation in lower extremities bilaterally   Reflexes                                           Right                      Left  Brachioradialis                    2+                         2+  Biceps                                 2+                         2+  Patellar                                4+                         4+  Achilles                                4+                         4+    Coordination  Right: Finger-to-nose normal  Rapid alternating movement abnormality: Slower  Left: Finger-to-nose normal  Rapid alternating movement normal     Gait  In a wheelchair   ROS:    Review of Systems   Constitutional: Positive for fatigue  Negative for appetite change and fever  HENT: Negative  Negative for hearing loss, tinnitus, trouble swallowing and voice change  Eyes: Positive for visual disturbance  Negative for photophobia and pain  Blurred vision    Respiratory: Positive for shortness of breath      Cardiovascular: Negative  Negative for palpitations  Gastrointestinal: Positive for constipation  Negative for nausea and vomiting  Endocrine: Negative  Negative for cold intolerance and heat intolerance  Genitourinary: Negative  Negative for dysuria, frequency and urgency  Musculoskeletal: Negative  Negative for myalgias and neck pain  Skin: Negative  Negative for rash  Allergic/Immunologic: Negative  Neurological: Positive for dizziness, tremors, weakness, light-headedness, numbness and headaches  Negative for seizures, syncope, facial asymmetry and speech difficulty  Tingling balance problems difficulty walking    Hematological: Negative  Does not bruise/bleed easily  Psychiatric/Behavioral: Negative  Negative for confusion, hallucinations and sleep disturbance       I personally reviewed the ROS

## 2018-11-28 NOTE — ASSESSMENT & PLAN NOTE
Patient with long-standing MS, overall stable  He is not on any IMD therapy  He has progressive gait changes and weakness in the lower extremities and would benefit from PT  He is currently in PT at [facility]  Will continue to monitor him

## 2018-11-28 NOTE — PATIENT INSTRUCTIONS
Patient following up for long-standing MS and recent left sided CVA  He also has hx of basilar aneurysm s/p coiling  He denies any new neurological symptoms  MS:  -patient currently on gabapentin 300mg 3 in the AM   Previously, he was taking 300mg TID  He feels this is making him tired   -will CHANGE gabapentin to 300mg 1 in the AM and 2 in the evening  CVA:  -currently on ASA 325mg (on prior to CVA) and newly added Plavix 75mg along with Lipitor 80mg for secondary stroke prevention  There is no significant intracranial stenosis that would warrant patient to be on dual anti-platelet therapy  -STOP ASA 325mg  -continue Plavix 75mg daily  -continue Lipitor 80mg daily  -cardiovascular risk factor control per PCP  A1C was uncontrolled at 8 4  PCP to manage  Patient also needs to maintain good control of lipids, blood pressure  -please continue PT and OT  He needs work on the fine motor in the RUE especially     Hx of Aneurysm:  -patient just had MRA head which was stable   -follow up with Dr Juan Kahn from neurosurgery on 12/3/18 @ 3:15pm (arrive at 3:00) for further follow up on this    Would like patient to follow up with vascular neurology attending (Dr Master Luther) in 4 months, or sooner if needed  Will also have him return for MS follow up in 8 months with me

## 2018-11-28 NOTE — ASSESSMENT & PLAN NOTE
Patient hospitalized in October 2018 due to right upper and lower extremity weakness (worse than baseline)  No tPA was given due to concern this was an MS exacerbation vs CVA  CTA head and neck showed some atherosclerosis, but no flow limiting stenosis or occlusion  MRI brain demonstrated a 1 2 cm focal area of diffusion restriction adjacent to the posterior body of the left lateral ventricle in the region of the posterior corona radiata without associated contrast enhancement compatible with acute lacunar infarct  A1C 8 4  Lipid panel ,   ECHO with EF 75%, no regional wall motion abnormalities  No atrial dilation  Etiology of the new CVA felt to be small vessel in origin in the setting of uncontrolled CV risk factors  He was on ASA 325mg at time of the event and API was in range  Plavix 75mg was added  His Lipitor was increased to 80mg daily    Patient doing well  He is currently in PT and OT at [facility] but reports he has not been getting as much therapy lately  He feels he needs help with fine motor in the right hand  His PCP is now through [facility] apparently  All of his meds come from [facility]  Plan:  Patient currently on ASA 325mg and Plavix 75mg along with Lipitor 80mg for secondary stroke prevention  There is no significant intracranial stenosis that would warrant patient to be on dual anti-platelet therapy long-term  -STOP ASA 325mg  -continue Plavix 75mg daily  -continue Lipitor 80mg daily  -cardiovascular risk factor control per PCP  A1C was uncontrolled at 8 4  PCP to manage  Patient also needs to maintain good control of lipids, blood pressure   -continue PT and OT  He needs work on fine motor in the [place] [address] especially   -follow up in 4 months with vascular neurology attending  -reviewed signs and symptoms of stroke and when to call 911  Patient to call for any questions/concerns, or new or worsening symptoms

## 2018-11-28 NOTE — ASSESSMENT & PLAN NOTE
S/p 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 in March 2015 by Dr Ned Butler  He now follows regularly with Dr Tanja Esqueda, has appt next week  His MRA was somehow ordered under my name, although ordered last year by Dr Tanja Esqueda  This was completed 11/23/18 and read as stable  He will follow up with Dr Tanja Esqueda next week so he can review the imaging with him as well

## 2018-11-29 ENCOUNTER — TELEPHONE (OUTPATIENT)
Dept: NEUROLOGY | Facility: CLINIC | Age: 68
End: 2018-11-29

## 2018-11-29 NOTE — TELEPHONE ENCOUNTER
Received fax from [employer] requesting last office note so that we can receive payment  Sent to [phone]

## 2018-12-03 ENCOUNTER — OFFICE VISIT (OUTPATIENT)
Dept: NEUROSURGERY | Facility: CLINIC | Age: 68
End: 2018-12-03
Payer: MEDICARE

## 2018-12-03 VITALS
HEART RATE: 89 BPM | TEMPERATURE: 97.6 F | SYSTOLIC BLOOD PRESSURE: 161 MMHG | RESPIRATION RATE: 16 BRPM | DIASTOLIC BLOOD PRESSURE: 89 MMHG

## 2018-12-03 DIAGNOSIS — I72.5 ANEURYSM OF BASILAR ARTERY (HCC): Primary | ICD-10-CM

## 2018-12-03 PROCEDURE — 99214 OFFICE O/P EST MOD 30 MIN: CPT | Performed by: NEUROLOGICAL SURGERY

## 2018-12-03 NOTE — PROGRESS NOTES
Patient Id: Lupis Thomas is a 70 y o  male        Assessment/Plan:    Diagnoses and all orders for this visit:    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[address] )  -     MRA head w wo contrast; Future        Discussion Summary:   1  Status post stent assisted coiling of basilar apex aneurysm in   MRA today does not show significant residual   Plan for repeat MRA in 2 years to ensure stability  If this is stable and his pain no further follow-up at that juncture  2    Stroke  Recent stroke in October  Was transition to aspirin Plavix and recently transitioned off aspirin to Plavix  CTA of the neck reviewed  Less than 50 percent stenosis of left ICA  I do not believe that he requires carotid endarterectomy at this time  He has a follow-up with Stroke Neurology and I will defer further care to them  I spent 40 minutes with the patient greater than 50 percent of which was spent in counseling  Chief Complaint: Follow-up (1 year follow up)        HPI:   Mr Muriel Burton Is a 48-year-old gentleman with a long history of multiple sclerosis who is followed by Dr Anastasia Andrews  he previously had a basilar apex aneurysm stent coiled by Dr Suzanne Montelongo in 2015  He underwent a 6 month follow-up arteriogram which revealed stable Malcolm 1 coil embolization of his aneurysm and has had annual MRAs since then  His health has deteriorated over the course of his original treatment knees had worsening lower extremity weakness  He has been doing well with the exception of a recent stroke in his left frontal region  This has left him with significant right upper extremity weakness    He returns today for follow-up of the aneurysm      He has a significant family history of aortic and intracranial aneurysms including paternal grandfather with brain aneurysm, father with an aortic aneurysm, sister with two treated brain aneurysms, a niece with two brain aneurysms, a nephew with a repaired aortic aneurysm, and another niece who  from brain aneurysm rupture      Review of Systems   Constitutional: Positive for fatigue  Negative for activity change, appetite change, chills, diaphoresis, fever and unexpected weight change  HENT: Negative  Eyes: Positive for visual disturbance (more blury vision)  Negative for photophobia, pain, discharge, redness and itching  Respiratory: Positive for shortness of breath  Negative for apnea, cough, choking, chest tightness, wheezing and stridor  Cardiovascular: Negative  Gastrointestinal: Positive for constipation  Negative for abdominal distention, abdominal pain, anal bleeding, blood in stool, diarrhea, nausea, rectal pain and vomiting  Endocrine: Negative  Genitourinary: Negative  Musculoskeletal: Positive for back pain (middle back)  Negative for arthralgias, gait problem, joint swelling, myalgias, neck pain and neck stiffness  Skin: Negative  Neurological: Positive for dizziness (once in a while) and weakness (right side)  Negative for tremors, seizures, syncope, facial asymmetry, speech difficulty, light-headedness, numbness and headaches  Hematological: Negative for adenopathy  Bruises/bleeds easily (plavix)  Psychiatric/Behavioral: Negative  Physical Exam  Vitals:    12/03/18 1510   BP: 161/89   Pulse: 89   Resp: 16   Temp: 97 6 °F (36 4 °C)    He is awake alert oriented  He is well appearing  He is in a wheelchair  His pupils are equal round reactive to light  His extraocular moves are intact  He has a slight right facial droop which is improved with smiling  Sensation is intact  He has approximately 4/5 diffusely in his right upper extremity  His left upper extremity is full strength  He has diffuse weakness in his bilateral lower extremities  This is stable for him      The following portions of the patient's history were reviewed and updated as appropriate: allergies, current medications, past family history, past medical history, past social history, past surgical history and problem list     Active Ambulatory Problems     Diagnosis Date Noted    Urinary tract infection 04/08/2014    Cellulitis 01/28/2016    Diabetic neuropathy ([facility] [address] ) 11/07/2016    Depression 06/11/2014    Cervical spinal stenosis 12/11/2014    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HCC) 11/29/2017    Benign colon polyp 06/19/2012    Benign prostatic hyperplasia 06/19/2012    [facility] 05/16/2016    Dyslipidemia 06/21/2015    Esophageal reflux 06/19/2012    Fatty liver 06/19/2012    Generalized anxiety disorder 07/13/2014    Glaucoma 06/19/2012    Hyperlipidemia 06/19/2012    Hypertension 06/11/2012    Multiple sclerosis [facility] ) 06/19/2012    Neurogenic bladder 08/25/2014    Obstructive sleep apnea 01/04/2013    Thyroid nodule 09/02/2015    Type 2 diabetes mellitus ([facility] ) 06/19/2012    Urinary retention 03/31/2014    History of CVA (cerebrovascular accident) 10/21/2018     Resolved Ambulatory Problems     Diagnosis Date Noted    Hypoglycemia 01/28/2016     Past Medical History:   Diagnosis Date    Acute laryngitis     Acute nonsuppurative otitis media, unspecified laterality     Arm weakness     Arthritis     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HCC)     Bladder infection     Bronchitis     Constipation     Cough     Diabetes mellitus (HCC)     Dizziness     Dysfunction of eustachian tube     Erectile dysfunction of non-organic origin     Fatigue     Glaucoma     Hiatal hernia     Hypertension     Imbalance     Leg muscle spasm     MS (multiple sclerosis) (HCC)     Nephrolithiasis     No natural teeth     Sinus pain     Spinal stenosis     Strain of thoracic region        Past Surgical History:   Procedure Laterality Date    APPENDECTOMY      BRAIN SURGERY      Coil placed in aneurysm    CYSTOSCOPY      CYSTOSCOPY      CYSTOSCOPY  06/11/2018    EYE SURGERY      transscleral cyclophotocoagulation noncontact YAG laser    MYRINGOTOMY      with ventilation tube insertion    SUPRAPUBIC CATHETER INSERTION           Current Outpatient Prescriptions:     ALPRAZolam (XANAX) 0 25 mg tablet, Take 0 25 mg by mouth daily at bedtime as needed for anxiety or sleep  , Disp: , Rfl:     amLODIPine (NORVASC) 10 mg tablet, Take 10 mg by mouth daily  , Disp: , Rfl:     atorvastatin (LIPITOR) 20 mg tablet, Take 20 mg by mouth daily at bedtime  , Disp: , Rfl:     baclofen 10 mg tablet, Take 2 5 mg by mouth daily  , Disp: , Rfl:     brimonidine-timolol (COMBIGAN) 0 2-0 5 %, Administer 1 drop to both eyes every 12 (twelve) hours, Disp: , Rfl:     clopidogrel (PLAVIX) 75 mg tablet, Take 1 tablet (75 mg total) by mouth daily, Disp: , Rfl: 0    docusate sodium (COLACE) 100 mg capsule, Take 1 capsule (100 mg total) by mouth 2 (two) times a day, Disp: 10 capsule, Rfl: 0    furosemide (LASIX) 20 mg tablet, Take 40 mg by mouth daily  , Disp: , Rfl:     gabapentin (NEURONTIN) 300 mg capsule, Take 300 mg by mouth 3 (three) times a day   , Disp: , Rfl:     losartan (COZAAR) 50 mg tablet, Take 50 mg by mouth daily  , Disp: , Rfl:     melatonin 3 mg, Take 2 tablets (6 mg total) by mouth daily at bedtime, Disp: , Rfl: 0    metFORMIN (GLUCOPHAGE) 500 mg tablet, Take 1,000 mg by mouth 2 (two) times a day with meals  , Disp: , Rfl:     pantoprazole (PROTONIX) 40 mg tablet, TAKE 1 TABLET TWICE DAILY 30 MINUTES BEFORE BREAKFAST AND DINNER , Disp: 180 tablet, Rfl: 1    potassium chloride (K-DUR,KLOR-CON) 10 mEq tablet, Take 1 tablet (10 mEq total) by mouth daily, Disp: , Rfl: 0    propranolol (INDERAL LA) 60 mg 24 hr capsule, TAKE 1 CAPSULE DAILY, Disp: 90 capsule, Rfl: 3    sitaGLIPtin (JANUVIA) 100 mg tablet, Take 100 mg by mouth daily, Disp: , Rfl:     sucralfate (CARAFATE) 1 g tablet, Take 1 tablet (1 g total) by mouth 2 (two) times a day, Disp: , Rfl: 0    Results/Data:   His most recent MRI was reviewed in detail  No significant residual of his basilar apex aneurysm    CT of his neck reviewed  Less than 50 percent stenosis of left ICA

## 2018-12-21 ENCOUNTER — TELEPHONE (OUTPATIENT)
Dept: UROLOGY | Facility: MEDICAL CENTER | Age: 68
End: 2018-12-21

## 2019-01-30 NOTE — PROGRESS NOTES
[address] [facility] [address] [facility] AT [address] Phone: [phone] Fax: [phone] DISCHARGE SUMMARY Patient Name: Verdell Kanner : 1943 Treatment/Medical Diagnosis: Pain in left wrist [M25.532] Referral Source: Rivka Xiong, * Date of Initial Visit: 18 Attended Visits: 25 Missed Visits: 0  
SUMMARY OF TREATMENTPatient was being treated for L wrist radial styloid fracture after fall at the gym. Treatment has included progressive therex for ROM/strength, flexibility and elbow strengthening, manual mobilizations and ice/heat contrasts for edema. CURRENT STATUSPatient made excellent progress with PT over 25 visits. Patient is compliant with HEP and feels she has all the tools and knowledge to cont with progress via HEP. Assessment as follows: 
Pain at best .5/10, at worst 3 Subjective % improvement 85% Objective:  
             Wrist AROM : flexion 58, extension 72, pronation 92, supination 95 Wrist strength: flexion 4+, extension 4, pronation 4, supination 4 Elbow AROM and strength WNL Improvements:  light to medium lifting, carrying, return to gym activities Deficits heavy lifting, tight gripping / opening tight jars Progress towards goals / Updated goals: 1.  Patient will increase score on the FOTO to > or = 70/100 to demo an increase in functional activity tolerance. Goal neat met at 69/100 (63/100 at last assess) 2.  Patient will demo symmetrical  strength to improve ease of opening jars for cooking/baking. Goal met - patient demo B symm   (decreased L at last assess) 3.  Patient will demo increased wrist extension strength to 4+/5 to improve ease with lifting. Goal  Met at 4+/5 (4/5 at last assess ) 4.  Patient will demo increased wrist flexion AROM to 65 deg to improve reach, carrying, ADL completion. -Goal met at 72 (51 at last assess ) Esdras Joseph is a 69-year-old male with a history of BPH and multiple sclerosis  His bladder has been managed with TURP and suprapubic tube insertion  His bladder neck appears to have contracted  He last underwent cystoscopy approximately a year ago which did show some small stones within the bladder  He returns to the office today to undergo cystoscopy  Risk and benefits of the procedure were discussed and reviewed  Informed consent was obtained  The patient was placed supine  His existing suprapubic tube was removed  The tract was prepped and draped in sterile fashion  Flexible cystoscopy was performed  Very small stone particles were identified within the bladder  No sizable stones were visualized  The bladder wall appeared mildly erythematous  There was no evidence of mucosal abnormality or lesions suspicious for urothelial carcinoma  Retroflexion was within normal limits  The bladder neck was identified  Attempts were made to pass the camera in an antegrade fashion but the cystoscope could not be passed through the bladder neck  The bladder was left full  The cystoscope was removed  A new 24 Polish suprapubic tube was placed in the bladder was irrigated  Multiple small stone fragments were removed  My impression is multiple sclerosis, neurogenic bladder, status post TURP, status post suprapubic tube insertion  I recommend follow-up in 6 months with repeat flexible cystoscopy to ensure that there are no residual stones within the bladder  I do not feel that the patient requires cystolitholapaxy at this time as he is asymptomatic and the stone fragments within his bladder are extremely small  In addition I explained to the patient that if I were to perform cystoscopy with cystolitholapaxy in the operating room I would likely need to cut or dilate open his bladder neck which could lead to incontinence per native urethra  The patient is amenable with 6 month follow-up  Mobility: Carry:   Goal  CJ= 20-39%  D/C  CJ= 20-39%. The severity rating is based on the FOTO Score RECOMMENDATIONS 
DC to HEP sec to goals met/ progressing and program complete If you have any questions/comments please contact us directly at [phone]. Thank you for allowing us to assist in the care of your patient. Reporting Period: 11/5/18-1/30/19 Date: 1/30/2019 PT Signature: Marielos Mcclendon DPT  Time: 705FP

## 2019-03-04 ENCOUNTER — TELEPHONE (OUTPATIENT)
Dept: NEUROLOGY | Facility: CLINIC | Age: 69
End: 2019-03-04

## 2019-04-05 ENCOUNTER — PROCEDURE VISIT (OUTPATIENT)
Dept: UROLOGY | Facility: CLINIC | Age: 69
End: 2019-04-05
Payer: MEDICARE

## 2019-04-05 VITALS — DIASTOLIC BLOOD PRESSURE: 62 MMHG | SYSTOLIC BLOOD PRESSURE: 108 MMHG | HEART RATE: 65 BPM

## 2019-04-05 DIAGNOSIS — N21.0 BLADDER STONES: Primary | ICD-10-CM

## 2019-04-05 PROCEDURE — 52000 CYSTOURETHROSCOPY: CPT | Performed by: UROLOGY

## 2019-04-05 PROCEDURE — 99214 OFFICE O/P EST MOD 30 MIN: CPT | Performed by: UROLOGY

## 2019-04-05 RX ORDER — SENNA AND DOCUSATE SODIUM 50; 8.6 MG/1; MG/1
1 TABLET, FILM COATED ORAL
COMMUNITY
End: 2019-05-02

## 2019-04-05 RX ORDER — CIPROFLOXACIN 2 MG/ML
400 INJECTION, SOLUTION INTRAVENOUS ONCE
Status: CANCELLED | OUTPATIENT
Start: 2019-04-05 | End: 2019-04-05

## 2019-04-05 RX ORDER — METFORMIN HYDROCHLORIDE EXTENDED-RELEASE TABLETS 1000 MG/1
1000 TABLET, FILM COATED, EXTENDED RELEASE ORAL
COMMUNITY
End: 2019-05-02

## 2019-04-05 RX ORDER — SODIUM CHLORIDE 9 MG/ML
125 INJECTION, SOLUTION INTRAVENOUS CONTINUOUS
Status: CANCELLED | OUTPATIENT
Start: 2019-04-05

## 2019-04-08 PROBLEM — N32.0 BLADDER NECK CONTRACTURE: Status: ACTIVE | Noted: 2019-04-08

## 2019-04-08 PROBLEM — N21.0 BLADDER STONES: Status: ACTIVE | Noted: 2019-04-08

## 2019-04-15 ENCOUNTER — OFFICE VISIT (OUTPATIENT)
Dept: NEUROLOGY | Facility: CLINIC | Age: 69
End: 2019-04-15
Payer: MEDICARE

## 2019-04-15 VITALS — SYSTOLIC BLOOD PRESSURE: 130 MMHG | DIASTOLIC BLOOD PRESSURE: 88 MMHG | HEART RATE: 88 BPM

## 2019-04-15 DIAGNOSIS — G47.33 OBSTRUCTIVE SLEEP APNEA: ICD-10-CM

## 2019-04-15 DIAGNOSIS — I10 ESSENTIAL HYPERTENSION: ICD-10-CM

## 2019-04-15 DIAGNOSIS — G35 MULTIPLE SCLEROSIS (HCC): ICD-10-CM

## 2019-04-15 DIAGNOSIS — Z79.4 TYPE 2 DIABETES MELLITUS WITH COMPLICATION, WITH LONG-TERM CURRENT USE OF INSULIN (HCC): ICD-10-CM

## 2019-04-15 DIAGNOSIS — I65.23 CAROTID STENOSIS, ASYMPTOMATIC, BILATERAL: ICD-10-CM

## 2019-04-15 DIAGNOSIS — Z86.73 HISTORY OF CVA (CEREBROVASCULAR ACCIDENT): Primary | ICD-10-CM

## 2019-04-15 DIAGNOSIS — E78.2 MIXED HYPERLIPIDEMIA: ICD-10-CM

## 2019-04-15 DIAGNOSIS — E11.8 TYPE 2 DIABETES MELLITUS WITH COMPLICATION, WITH LONG-TERM CURRENT USE OF INSULIN (HCC): ICD-10-CM

## 2019-04-15 PROCEDURE — 99215 OFFICE O/P EST HI 40 MIN: CPT | Performed by: PSYCHIATRY & NEUROLOGY

## 2019-04-15 RX ORDER — GABAPENTIN 300 MG/1
CAPSULE ORAL
Qty: 120 CAPSULE | Refills: 5 | Status: SHIPPED | OUTPATIENT
Start: 2019-04-15 | End: 2021-06-02 | Stop reason: ALTCHOICE

## 2019-05-02 ENCOUNTER — ANESTHESIA EVENT (OUTPATIENT)
Dept: PERIOP | Facility: HOSPITAL | Age: 69
End: 2019-05-02
Payer: MEDICARE

## 2019-05-02 ENCOUNTER — TELEPHONE (OUTPATIENT)
Dept: UROLOGY | Facility: AMBULATORY SURGERY CENTER | Age: 69
End: 2019-05-02

## 2019-05-02 ENCOUNTER — HOSPITAL ENCOUNTER (OUTPATIENT)
Dept: NON INVASIVE DIAGNOSTICS | Facility: CLINIC | Age: 69
Discharge: HOME | End: 2019-05-02
Payer: MEDICARE

## 2019-05-02 DIAGNOSIS — N39.0 ACUTE UTI: Primary | ICD-10-CM

## 2019-05-02 DIAGNOSIS — I65.23 CAROTID STENOSIS, ASYMPTOMATIC, BILATERAL: ICD-10-CM

## 2019-05-02 PROCEDURE — 93880 EXTRACRANIAL BILAT STUDY: CPT | Performed by: SURGERY

## 2019-05-02 PROCEDURE — 93880 EXTRACRANIAL BILAT STUDY: CPT

## 2019-05-02 RX ORDER — CIPROFLOXACIN 500 MG/1
500 TABLET, FILM COATED ORAL EVERY 12 HOURS SCHEDULED
Qty: 10 TABLET | Refills: 0 | Status: SHIPPED | OUTPATIENT
Start: 2019-05-02 | End: 2019-05-07

## 2019-05-03 ENCOUNTER — TELEPHONE (OUTPATIENT)
Dept: NEUROLOGY | Facility: CLINIC | Age: 69
End: 2019-05-03

## 2019-05-07 ENCOUNTER — HOSPITAL ENCOUNTER (OUTPATIENT)
Facility: HOSPITAL | Age: 69
Setting detail: OUTPATIENT SURGERY
Discharge: NURSING, REHAB OR LONG-TERM CARE | End: 2019-05-07
Attending: UROLOGY | Admitting: UROLOGY
Payer: MEDICARE

## 2019-05-07 ENCOUNTER — ANESTHESIA (OUTPATIENT)
Dept: PERIOP | Facility: HOSPITAL | Age: 69
End: 2019-05-07
Payer: MEDICARE

## 2019-05-07 VITALS
TEMPERATURE: 98 F | HEART RATE: 72 BPM | RESPIRATION RATE: 18 BRPM | SYSTOLIC BLOOD PRESSURE: 112 MMHG | OXYGEN SATURATION: 98 % | DIASTOLIC BLOOD PRESSURE: 55 MMHG | WEIGHT: 185 LBS | HEIGHT: 65 IN | BODY MASS INDEX: 30.82 KG/M2

## 2019-05-07 DIAGNOSIS — N32.0 BLADDER NECK CONTRACTURE: ICD-10-CM

## 2019-05-07 DIAGNOSIS — N21.0 BLADDER STONES: ICD-10-CM

## 2019-05-07 LAB
GLUCOSE SERPL-MCNC: 177 MG/DL (ref 65–140)
GLUCOSE SERPL-MCNC: 188 MG/DL (ref 65–140)

## 2019-05-07 PROCEDURE — 51705 CHANGE OF BLADDER TUBE: CPT | Performed by: UROLOGY

## 2019-05-07 PROCEDURE — C1769 GUIDE WIRE: HCPCS | Performed by: UROLOGY

## 2019-05-07 PROCEDURE — 82360 CALCULUS ASSAY QUANT: CPT | Performed by: UROLOGY

## 2019-05-07 PROCEDURE — NC001 PR NO CHARGE: Performed by: UROLOGY

## 2019-05-07 PROCEDURE — 82948 REAGENT STRIP/BLOOD GLUCOSE: CPT

## 2019-05-07 PROCEDURE — 52317 REMOVE BLADDER STONE: CPT | Performed by: UROLOGY

## 2019-05-07 PROCEDURE — 87086 URINE CULTURE/COLONY COUNT: CPT | Performed by: UROLOGY

## 2019-05-07 RX ORDER — CIPROFLOXACIN 2 MG/ML
400 INJECTION, SOLUTION INTRAVENOUS ONCE
Status: COMPLETED | OUTPATIENT
Start: 2019-05-07 | End: 2019-05-07

## 2019-05-07 RX ORDER — LIDOCAINE HYDROCHLORIDE 20 MG/ML
INJECTION, SOLUTION INFILTRATION; PERINEURAL AS NEEDED
Status: DISCONTINUED | OUTPATIENT
Start: 2019-05-07 | End: 2019-05-07 | Stop reason: SURG

## 2019-05-07 RX ORDER — EPHEDRINE SULFATE 50 MG/ML
INJECTION INTRAVENOUS AS NEEDED
Status: DISCONTINUED | OUTPATIENT
Start: 2019-05-07 | End: 2019-05-07 | Stop reason: SURG

## 2019-05-07 RX ORDER — PROPOFOL 10 MG/ML
INJECTION, EMULSION INTRAVENOUS AS NEEDED
Status: DISCONTINUED | OUTPATIENT
Start: 2019-05-07 | End: 2019-05-07 | Stop reason: SURG

## 2019-05-07 RX ORDER — GENTAMICIN SULFATE 40 MG/ML
INJECTION, SOLUTION INTRAMUSCULAR; INTRAVENOUS AS NEEDED
Status: DISCONTINUED | OUTPATIENT
Start: 2019-05-07 | End: 2019-05-07 | Stop reason: SURG

## 2019-05-07 RX ORDER — MAGNESIUM HYDROXIDE 1200 MG/15ML
LIQUID ORAL AS NEEDED
Status: DISCONTINUED | OUTPATIENT
Start: 2019-05-07 | End: 2019-05-07 | Stop reason: HOSPADM

## 2019-05-07 RX ORDER — CIPROFLOXACIN 500 MG/1
500 TABLET, FILM COATED ORAL 2 TIMES DAILY
Qty: 10 TABLET | Refills: 0 | Status: SHIPPED | OUTPATIENT
Start: 2019-05-07 | End: 2019-05-12

## 2019-05-07 RX ORDER — ONDANSETRON 2 MG/ML
INJECTION INTRAMUSCULAR; INTRAVENOUS AS NEEDED
Status: DISCONTINUED | OUTPATIENT
Start: 2019-05-07 | End: 2019-05-07 | Stop reason: SURG

## 2019-05-07 RX ORDER — ONDANSETRON 2 MG/ML
4 INJECTION INTRAMUSCULAR; INTRAVENOUS EVERY 4 HOURS PRN
Status: DISCONTINUED | OUTPATIENT
Start: 2019-05-07 | End: 2019-05-07 | Stop reason: HOSPADM

## 2019-05-07 RX ORDER — LIDOCAINE HYDROCHLORIDE 10 MG/ML
1 INJECTION, SOLUTION INFILTRATION; PERINEURAL ONCE
Status: COMPLETED | OUTPATIENT
Start: 2019-05-07 | End: 2019-05-07

## 2019-05-07 RX ORDER — SODIUM CHLORIDE 9 MG/ML
125 INJECTION, SOLUTION INTRAVENOUS CONTINUOUS
Status: DISCONTINUED | OUTPATIENT
Start: 2019-05-07 | End: 2019-05-07 | Stop reason: HOSPADM

## 2019-05-07 RX ORDER — SODIUM CHLORIDE, SODIUM LACTATE, POTASSIUM CHLORIDE, CALCIUM CHLORIDE 600; 310; 30; 20 MG/100ML; MG/100ML; MG/100ML; MG/100ML
75 INJECTION, SOLUTION INTRAVENOUS CONTINUOUS
Status: DISCONTINUED | OUTPATIENT
Start: 2019-05-07 | End: 2019-05-07 | Stop reason: HOSPADM

## 2019-05-07 RX ORDER — SODIUM CHLORIDE, SODIUM LACTATE, POTASSIUM CHLORIDE, CALCIUM CHLORIDE 600; 310; 30; 20 MG/100ML; MG/100ML; MG/100ML; MG/100ML
50 INJECTION, SOLUTION INTRAVENOUS CONTINUOUS
Status: DISCONTINUED | OUTPATIENT
Start: 2019-05-07 | End: 2019-05-07

## 2019-05-07 RX ORDER — FENTANYL CITRATE/PF 50 MCG/ML
25 SYRINGE (ML) INJECTION
Status: DISCONTINUED | OUTPATIENT
Start: 2019-05-07 | End: 2019-05-07 | Stop reason: HOSPADM

## 2019-05-07 RX ADMIN — EPHEDRINE SULFATE 10 MG: 50 INJECTION, SOLUTION INTRAVENOUS at 12:55

## 2019-05-07 RX ADMIN — SODIUM CHLORIDE, SODIUM LACTATE, POTASSIUM CHLORIDE, AND CALCIUM CHLORIDE 75 ML/HR: .6; .31; .03; .02 INJECTION, SOLUTION INTRAVENOUS at 12:12

## 2019-05-07 RX ADMIN — CIPROFLOXACIN 400 MG: 2 INJECTION, SOLUTION INTRAVENOUS at 12:10

## 2019-05-07 RX ADMIN — LIDOCAINE HYDROCHLORIDE 0.5 ML: 10 INJECTION, SOLUTION EPIDURAL; INFILTRATION; INTRACAUDAL; PERINEURAL at 12:12

## 2019-05-07 RX ADMIN — ONDANSETRON 4 MG: 2 INJECTION INTRAMUSCULAR; INTRAVENOUS at 13:31

## 2019-05-07 RX ADMIN — SODIUM CHLORIDE, SODIUM LACTATE, POTASSIUM CHLORIDE, AND CALCIUM CHLORIDE: .6; .31; .03; .02 INJECTION, SOLUTION INTRAVENOUS at 12:45

## 2019-05-07 RX ADMIN — PROPOFOL 150 MG: 10 INJECTION, EMULSION INTRAVENOUS at 12:50

## 2019-05-07 RX ADMIN — CIPROFLOXACIN: 2 INJECTION, SOLUTION INTRAVENOUS at 12:43

## 2019-05-07 RX ADMIN — GENTAMICIN SULFATE 80 MG: 40 INJECTION, SOLUTION INTRAMUSCULAR; INTRAVENOUS at 12:58

## 2019-05-07 RX ADMIN — LIDOCAINE HYDROCHLORIDE 5 ML: 20 INJECTION, SOLUTION INFILTRATION; PERINEURAL at 12:50

## 2019-05-09 LAB — BACTERIA UR CULT: NORMAL

## 2019-05-10 LAB
AMM URATE MFR STONE: 10 %
CA PHOS MFR STONE: 70 %
COLOR STONE: NORMAL
COM MFR STONE: 20 %
COMMENT-STONE3: NORMAL
COMPOSITION: NORMAL
LABORATORY COMMENT REPORT: NORMAL
NIDUS STONE QL: NORMAL
PHOTO: NORMAL
SIZE STONE: NORMAL MM
STONE ANALYSIS-IMP: NORMAL
STONE ANALYSIS-IMP: NORMAL
WT STONE: 4165.8 MG

## 2019-05-12 ENCOUNTER — TELEPHONE (OUTPATIENT)
Dept: OTHER | Facility: HOSPITAL | Age: 69
End: 2019-05-12

## 2019-06-03 ENCOUNTER — OFFICE VISIT (OUTPATIENT)
Dept: UROLOGY | Facility: CLINIC | Age: 69
End: 2019-06-03
Payer: MEDICARE

## 2019-06-03 VITALS — HEART RATE: 76 BPM | SYSTOLIC BLOOD PRESSURE: 132 MMHG | DIASTOLIC BLOOD PRESSURE: 76 MMHG

## 2019-06-03 DIAGNOSIS — N32.0 BLADDER NECK CONTRACTURE: Primary | ICD-10-CM

## 2019-06-03 DIAGNOSIS — R33.9 URINARY RETENTION: ICD-10-CM

## 2019-06-03 DIAGNOSIS — N21.0 BLADDER STONES: ICD-10-CM

## 2019-06-03 PROCEDURE — 99213 OFFICE O/P EST LOW 20 MIN: CPT | Performed by: PHYSICIAN ASSISTANT

## 2019-06-03 PROCEDURE — 51700 IRRIGATION OF BLADDER: CPT | Performed by: PHYSICIAN ASSISTANT

## 2019-06-14 ENCOUNTER — TELEPHONE (OUTPATIENT)
Dept: FAMILY MEDICINE CLINIC | Facility: CLINIC | Age: 69
End: 2019-06-14

## 2019-09-12 LAB — HBA1C MFR BLD HPLC: 9.7 %

## 2019-09-26 ENCOUNTER — TRANSCRIBE ORDERS (OUTPATIENT)
Dept: ADMINISTRATIVE | Facility: HOSPITAL | Age: 69
End: 2019-09-26

## 2019-09-26 DIAGNOSIS — G35 MULTIPLE SCLEROSIS (HCC): ICD-10-CM

## 2019-09-26 DIAGNOSIS — J44.9 CHRONIC OBSTRUCTIVE PULMONARY DISEASE, UNSPECIFIED COPD TYPE (HCC): Primary | ICD-10-CM

## 2019-10-01 ENCOUNTER — HOSPITAL ENCOUNTER (OUTPATIENT)
Dept: PULMONOLOGY | Facility: HOSPITAL | Age: 69
Discharge: HOME | End: 2019-10-01
Payer: MEDICARE

## 2019-10-01 DIAGNOSIS — G35 MULTIPLE SCLEROSIS (HCC): ICD-10-CM

## 2019-10-01 DIAGNOSIS — J44.9 CHRONIC OBSTRUCTIVE PULMONARY DISEASE, UNSPECIFIED COPD TYPE (HCC): ICD-10-CM

## 2019-10-01 PROCEDURE — 94060 EVALUATION OF WHEEZING: CPT

## 2019-10-01 PROCEDURE — 94729 DIFFUSING CAPACITY: CPT | Performed by: INTERNAL MEDICINE

## 2019-10-01 PROCEDURE — 94010 BREATHING CAPACITY TEST: CPT | Performed by: INTERNAL MEDICINE

## 2019-10-01 PROCEDURE — 94760 N-INVAS EAR/PLS OXIMETRY 1: CPT

## 2019-10-01 PROCEDURE — 94010 BREATHING CAPACITY TEST: CPT

## 2019-10-01 PROCEDURE — 94729 DIFFUSING CAPACITY: CPT

## 2019-10-01 RX ORDER — ALBUTEROL SULFATE 2.5 MG/3ML
2.5 SOLUTION RESPIRATORY (INHALATION) ONCE AS NEEDED
Status: COMPLETED | OUTPATIENT
Start: 2019-10-01 | End: 2019-10-01

## 2019-10-01 RX ADMIN — ALBUTEROL SULFATE 2.5 MG: 2.5 SOLUTION RESPIRATORY (INHALATION) at 15:05

## 2019-10-16 ENCOUNTER — OFFICE VISIT (OUTPATIENT)
Dept: OBGYN CLINIC | Facility: OTHER | Age: 69
End: 2019-10-16
Payer: MEDICARE

## 2019-10-16 ENCOUNTER — APPOINTMENT (OUTPATIENT)
Dept: RADIOLOGY | Facility: OTHER | Age: 69
End: 2019-10-16
Payer: MEDICARE

## 2019-10-16 VITALS
WEIGHT: 180 LBS | DIASTOLIC BLOOD PRESSURE: 72 MMHG | SYSTOLIC BLOOD PRESSURE: 127 MMHG | HEART RATE: 76 BPM | BODY MASS INDEX: 29.99 KG/M2 | HEIGHT: 65 IN

## 2019-10-16 DIAGNOSIS — M25.511 RIGHT SHOULDER PAIN, UNSPECIFIED CHRONICITY: ICD-10-CM

## 2019-10-16 DIAGNOSIS — M54.12 RIGHT CERVICAL RADICULOPATHY: Primary | ICD-10-CM

## 2019-10-16 PROCEDURE — 73030 X-RAY EXAM OF SHOULDER: CPT

## 2019-10-16 PROCEDURE — 99203 OFFICE O/P NEW LOW 30 MIN: CPT | Performed by: ORTHOPAEDIC SURGERY

## 2019-10-16 NOTE — PROGRESS NOTES
Orthopaedic Surgery - Office Note  Deann Tineo (54 y o  male)   : 1950   MRN: [id number]  Encounter Date: 10/16/2019    Chief Complaint   Patient presents with    Right Shoulder - Pain       Assessment / Plan  Right sided cervical radiculopathy    · Activity as tolerated  · Home exercise program reviewed  · Begin outpatient PT for right sided cervical radiculopathy   · If symptoms persist is instructed to call office and visit with our spine pain team for further evaluation and treatment of his cervical spine  Return if symptoms worsen or fail to improve  History of Present Illness  Deann Tineo is a 78 y o  male who presents today for an initial visit for his right shoulder  Patient states that he has been experiencing symptoms for about 3-4 weeks now without any certain mechanism of injury  Patient reports that his symptoms begin around his scapular and radiate into his right anterolateral shoulder with a "tingling" sensation to the forearm, wrist, hand and ring and pinky fingers  Patient does report intermittent cervical pain  He states that when he puts his hand above his head he has relief of his symptoms  He has currently not had any treatment for this pain  Review of Systems  Pertinent items are noted in HPI  All other systems were reviewed and are negative  Physical Exam  /72   Pulse 76   Ht 5' 5" (1 651 m)   Wt 81 6 kg (180 lb)   BMI 29 95 kg/m²   Cons: Appears well  No apparent distress  Psych: Alert  Oriented x3  Mood and affect normal   Eyes: PERRLA, EOMI  Resp: Normal effort  No audible wheezing or stridor  CV: Palpable pulse  No discernable arrhythmia  No LE edema  Lymph:  No palpable cervical, axillary, or inguinal lymphadenopathy  Skin: Warm  No palpable masses  No visible lesions  Neuro: Normal muscle tone  Normal and symmetric DTR's  Right Shoulder Exam  Alignment / Posture:  Normal shoulder posture  Normal scapular position    Inspection:  No swelling  No edema  No erythema  Palpation:  No tenderness  No effusion  No warmth  ROM:  Shoulder   Shoulder ER 60  Shoulder IR Lumbar  Strength:  5/5 supraspinatus, infraspinatus, and subscapularis  Stability:  No objective shoulder instability  Tests: (+) Painful arc  (+) Spurling  (-) Campuzano  (-) Neer  (-) Belly press  Neurovascular:  Sensation intact in Ax/R/M/U nerve distributions  2+ radial pulse  Studies Reviewed  I have personally reviewed pertinent films in PACS  XR of right shoulder - mild to moderate AC joint osteoarthritis  Evidence of Calcific tendinitis  No other acute osseous abnormality    Procedures  No procedures today  Medical, Surgical, Family, and Social History  The patient's medical history, family history, and social history, were reviewed and updated as appropriate      Past Medical History:   Diagnosis Date    Acute laryngitis     Acute nonsuppurative otitis media, unspecified laterality     Arm weakness     Arthritis     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HCC)     Bladder infection     Bronchitis     Constipation     Cough     Diabetes mellitus (HCC)     Dizziness     Dysfunction of eustachian tube     Erectile dysfunction of non-organic origin     Fatigue     Glaucoma     Hiatal hernia     Hypertension     Imbalance     Leg muscle spasm     MS (multiple sclerosis) (HCC)     Nephrolithiasis     No natural teeth     Sinus pain     Spinal stenosis     Strain of thoracic region     Stroke ([address] )     Suprapubic catheter ([address] )        Past Surgical History:   Procedure Laterality Date    APPENDECTOMY      BRAIN SURGERY      Coil placed in aneurysm    CYSTOSCOPY      CYSTOSCOPY      CYSTOSCOPY  06/11/2018    EYE SURGERY      transscleral cyclophotocoagulation noncontact YAG laser    MYRINGOTOMY      with ventilation tube insertion    WY REMOVE BLADDER STONE,<2 5 CM N/A 5/7/2019    Procedure: CYSTOSCOPY, holmium laser litholapaxy of bladder stones, EXCHANGE OF SP TUBE;  Surgeon: Kye Nugent MD;  [facility];  Service: Urology    SUPRAPUBIC CATHETER INSERTION         Family History   Problem Relation Age of Onset    Heart attack Mother     Stroke Mother     Heart attack Father     Anuerysm Father         In Stomach        Social History     Occupational History    Occupation:    retired   Tobacco Use    Smoking status: Former Smoker     Types: Cigarettes    Smokeless tobacco: Never Used    Tobacco comment: smoked for 15-20 years, stopped about 25 years ago   Substance and Sexual Activity    Alcohol use: No    Drug use: No    Sexual activity: Never       Allergies   Allergen Reactions    Cephalexin Rash         Current Outpatient Medications:     ALPRAZolam (XANAX) 0 25 mg tablet, Take 0 25 mg by mouth daily at bedtime as needed for anxiety or sleep  , Disp: , Rfl:     amLODIPine (NORVASC) 10 mg tablet, Take 10 mg by mouth daily  , Disp: , Rfl:     atorvastatin (LIPITOR) 20 mg tablet, Take 20 mg by mouth daily at bedtime  , Disp: , Rfl:     baclofen 10 mg tablet, Take 2 5 mg by mouth 2 (two) times a day Takes 1 tablet in am and 1 tablet @ 2pm, Disp: , Rfl:     brimonidine-timolol (COMBIGAN) 0 2-0 5 %, Administer 1 drop to both eyes every 12 (twelve) hours, Disp: , Rfl:     clopidogrel (PLAVIX) 75 mg tablet, Take 1 tablet (75 mg total) by mouth daily, Disp: , Rfl: 0    furosemide (LASIX) 20 mg tablet, Take 40 mg by mouth daily  , Disp: , Rfl:     gabapentin (NEURONTIN) 300 mg capsule, Take 1 capsule (300 mg total) by mouth 2 (two) times a day AND 2 capsules (600 mg total) daily at bedtime  , Disp: 120 capsule, Rfl: 5    losartan (COZAAR) 50 mg tablet, Take 50 mg by mouth daily  , Disp: , Rfl:     metFORMIN (GLUCOPHAGE) 500 mg tablet, Take 1,000 mg by mouth 2 (two) times a day with meals  , Disp: , Rfl:     pantoprazole (PROTONIX) 40 mg tablet, TAKE 1 TABLET TWICE DAILY 30 MINUTES BEFORE BREAKFAST AND DINNER , Disp: 180 tablet, Rfl: 1    potassium chloride (K-DUR,KLOR-CON) 10 mEq tablet, Take 1 tablet (10 mEq total) by mouth daily, Disp: , Rfl: 0    propranolol (INDERAL LA) 60 mg 24 hr capsule, TAKE 1 CAPSULE DAILY, Disp: 90 capsule, Rfl: 3    sitaGLIPtin (JANUVIA) 100 mg tablet, Take 100 mg by mouth daily, Disp: , Rfl:     sucralfate (CARAFATE) 1 g tablet, Take 1 tablet (1 g total) by mouth 2 (two) times a day, Disp: , Rfl: 0      Albert Peacock    Scribe Attestation    I,:   David Martínez am acting as a scribe while in the presence of the attending physician :        I,:   Amanda Ellis MD personally performed the services described in this documentation    as scribed in my presence :

## 2019-11-15 ENCOUNTER — TELEPHONE (OUTPATIENT)
Dept: UROLOGY | Facility: MEDICAL CENTER | Age: 69
End: 2019-11-15

## 2019-11-15 NOTE — TELEPHONE ENCOUNTER
Pt managed by [address] Jake Juárez called to reschedule today's appointment to 12/23/19 8am due to pt being ill

## 2019-11-29 ENCOUNTER — TELEPHONE (OUTPATIENT)
Dept: NEUROLOGY | Facility: CLINIC | Age: 69
End: 2019-11-29

## 2019-12-03 ENCOUNTER — OFFICE VISIT (OUTPATIENT)
Dept: NEUROLOGY | Facility: CLINIC | Age: 69
End: 2019-12-03
Payer: MEDICARE

## 2019-12-03 VITALS — DIASTOLIC BLOOD PRESSURE: 100 MMHG | HEART RATE: 107 BPM | SYSTOLIC BLOOD PRESSURE: 199 MMHG

## 2019-12-03 DIAGNOSIS — I72.5 ANEURYSM OF BASILAR ARTERY (HCC): ICD-10-CM

## 2019-12-03 DIAGNOSIS — G35 MULTIPLE SCLEROSIS (HCC): Primary | ICD-10-CM

## 2019-12-03 DIAGNOSIS — Z86.73 HISTORY OF CVA (CEREBROVASCULAR ACCIDENT): ICD-10-CM

## 2019-12-03 PROCEDURE — 99214 OFFICE O/P EST MOD 30 MIN: CPT | Performed by: PHYSICIAN ASSISTANT

## 2019-12-03 NOTE — PROGRESS NOTES
Patient ID: Ruth Link is a 78 y o  male  Assessment/Plan:    Multiple sclerosis ([facility] [place] [address] )  Patient with long-standing MS, overall stable  He is not on IMD therapy  He has had progressive weakness in the LEs and now is mainly using the WC, does not ambulate much at all anymore  He is getting PT through [facility]  Exam is stable today  History of CVA (cerebrovascular accident)  Patient is s/p left sided CVA in October 2018  This was likely small vessel in origin due to uncontrolled vascular risk factors  He was started on Plavix (to replace ASA) and Lipitor increased at time of CVA  It does not seem his risk factors are well controlled at this time  His blood pressure is quite elevated today  His labs from April 2019 show elevated A1C at 9 8, and LDL of 163  Statin is not on his med list today and patient unsure why, does not recall any issues with it  I called [facility] and spoke with a nurse, Jeronimo Greenfield  I asked why he was not on a statin  She put me on hold to look through notes and came back on the line and said she would call me back and continue to look through the notes  She also said he had labs done more recently in Sept 2019 and would fax them to our office  I am very concerned with his uncontrolled cardiovascular risk factors  He goes to [facility] and they should be checking his BP regularly there and making adjustments as needed  I am going to follow up with [facility] tomorrow and make sure he is seeing their doctor regularly there  We discussed signs and symptoms of stroke in detail today  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HCC)  S/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 in March 2015 by Dr Josiah Issa  He now follows with Dr Henna Guerrero  Imaging due in 2020 per Dr Malathi Spaulding last note  Patient to return in 4 months (scheduled with Dr Efra Kearns already)  He is to call for any new or worsening symptoms         Diagnoses and all orders for this visit:    Multiple sclerosis ([facility] [address] )    History of CVA (cerebrovascular accident)    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[facility] [address] )    Other orders           Subjective:    HPI     77 y/o male presents for neurologic follow up, last seen by me one year ago in November 2019  He was more recently seen by Dr Huan Ly in April 2019  Patient diagnosed with probable MS in the 1960s  Actually unclear diagnosis from time of presentation to presentation to our center in 2014  Patient recalls at age of 11 having chest pain and then numbness of the left toes tingling up the leg to mid chest around T6 and then down the right side in similar distribution to the right foot  Patient had loss of control of bowel and bladder at that time  He had no ability to walk and was in the hospital for over 6 months  He was only able to ambulate with crutches in his 20s, 30s, 40s, etc  Patient still uses Nunda crutches as well as a wheelchair to ambulate  Patient was only ever given ACTH in the past  He was never on IMD meds prior to coming to our center in 2014  He was told he may have had a CVA that affected his walking  Patient with suprapubic catheter placed by Dr Chiquis Goodman  Labs unremarkable, NMO negative  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  MRI c-spine did not reveal any lesions  MRI t-spine reveals cord lesion from T3-5; no comparison on file  Patient with longstanding dizziness; he has strong family history of brain aneurysms- his sister had 3 aneurysms, 1 niece with 3 aneurysms, 1 niece who passed away from aneurysm and 1 nephew with aneurysm; patient's father with aneurysm in his abdominal area  He had CTA in January 2015, 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  He was seen neuro-surg and underwent 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 in March 2015 by Dr Harshal Juarez of neuro-surg  He now follows with Dr Sheldon Lee for monitoring of the aneurysm       Patient was hospitalized 10/21/18-10/26/18  He arrived via EMS as a prehospital stroke alert due to RUE weakness and worsened RLE weakness (weak at baseline due to [address])  NIHSS 7  He was not given tPA due to concern this was MS exacerbation  CTH negative for acute infarction  CTA head and neck demonstrated mild narrowing at the origin of the left common carotid artery, vertebral arteries bilaterally, and right internal carotid artery origins without significant stenosis  Approximately 50% narrowing at the origin of the left internal carotid artery  Mild narrowing of the cavernous to supraclinoid internal carotid arteries  No high-grade proximal stenosis to visualized [place]  Stable aneurysm coil seen at the basilar tip  Patient was admitted for further workup  He was also started on antibiotics for suspected UTI  MRI brain demonstrated a 1 2 cm focal area of diffusion restriction adjacent to the posterior body of the left lateral ventricle in the region of the posterior corona radiata without associated contrast enhancement compatible with acute lacunar infarct  A1C 8 4  Lipid panel ,   ECHO with EF 75%, no regional wall motion abnormalities  No atrial dilation  This was felt to be small vessel in origin  His ASA was changed to Plavix 75mg and Lipitor was increased from 20mg to 80mg daily  Today, patient reports he is overall about the same  He denies any new symptoms at this time  While reviewing his med list, I do not see he is on a statin anymore  Patient unsure why, does not recall any issues with it  He now sees a PCP at [facility]  Last labs on file are from April 2019  A1C was 9 8, total chol 242,   He is not sure if he has had labs since then  He does not check BP at home and says he does not have it checked regularly at [facility]  He goes to their program on Mon, Wed, Fri          The following portions of the patient's history were reviewed and updated as appropriate: current medications, past family history, past medical history, past social history, past surgical history and problem list          Objective:    Blood pressure (!) 199/100, pulse (!) 107  Physical Exam   Constitutional: He appears well-developed and well-nourished  HENT:   Head: Normocephalic and atraumatic  Eyes: Pupils are equal, round, and reactive to light  EOM are normal    Cardiovascular: Intact distal pulses  Neurological: Coordination normal    Reflex Scores:       Bicep reflexes are 2+ on the right side and 2+ on the left side  Brachioradialis reflexes are 2+ on the right side and 2+ on the left side  Patellar reflexes are 3+ on the right side and 3+ on the left side  Achilles reflexes are 2+ on the right side and 2+ on the left side  Skin: Skin is warm and dry  Psychiatric: He has a normal mood and affect  His speech is normal        Neurological Exam  Mental Status   Oriented to person, place, time and situation  Recent and remote memory are intact  Speech is normal  Language is fluent with no aphasia  Attention and concentration are normal     Cranial Nerves  CN II: Visual fields full to confrontation  CN III, IV, VI: Extraocular movements intact bilaterally  Pupils equal round and reactive to light bilaterally  CN V: Facial sensation is normal   CN VII: Full and symmetric facial movement  CN VIII: Hearing is normal   CN IX, X: Palate elevates symmetrically  CN XI: Shoulder shrug strength is normal   CN XII: Tongue midline without atrophy or fasciculations  Motor   Normal muscle tone                                               Right                     Left   Shoulder abduction               5-                          5  Elbow flexion                         5-                          5  Elbow extension                    5-                          5  Knee flexion                           1                          1  Plantarflexion                         0 0  Right fixation  Sensory  Light touch is normal in upper and lower extremities  Reflexes                                           Right                      Left  Brachioradialis                    2+                         2+  Biceps                                 2+                         2+  Patellar                                3+                         3+  Achilles                                2+                         2+    Coordination  Finger-to-nose, rapid alternating movements and heel-to-shin normal bilaterally without dysmetria  Gait  Non-ambulatory, in a wheelchair   ROS:    Review of Systems   Constitutional: Negative  Negative for appetite change and fever  HENT: Negative  Negative for hearing loss, tinnitus, trouble swallowing and voice change  Eyes: Positive for visual disturbance (blurred)  Negative for photophobia and pain  Respiratory: Negative  Negative for shortness of breath  Cardiovascular: Negative  Negative for palpitations  Gastrointestinal: Negative  Negative for nausea and vomiting  Endocrine: Negative  Negative for cold intolerance and heat intolerance  Hair loss     Genitourinary: Negative  Negative for dysuria, frequency and urgency  Musculoskeletal: Negative  Negative for myalgias and neck pain  Skin: Negative  Negative for rash  Neurological: Positive for headaches  Negative for dizziness, tremors, seizures, syncope, facial asymmetry, speech difficulty, weakness, light-headedness and numbness  Hematological: Bruises/bleeds easily  Psychiatric/Behavioral: Negative for confusion, hallucinations and sleep disturbance  The patient is nervous/anxious        I personally reviewed and updated the ROS as appropriate

## 2019-12-03 NOTE — PATIENT INSTRUCTIONS
Patient following up for long-standing MS and recent left sided CVA  He also has hx of basilar aneurysm s/p coiling  He denies any new neurological symptoms      MS:  -patient currently on gabapentin 300mg 3 per day  Will continue        CVA:  -continue Plavix 75mg daily  -unclear why patient is not on statin  I called [facility] and spoke with nurse Myriam to inquire why he is not on statin  She was looking through notes and will get back to me  Discussed if no compelling reason why he cannot be on a statin, he should be due to history of CVA  -asked [facility] to fax me a copy of labs from Sept 2019  -cardiovascular risk factor control per PCP  Patient needs to maintain good control of lipids, blood pressure, blood sugar  Goal BP <130/80  This should be monitored regularly   -please continue PT and ST     Hx of Aneurysm:  -stable    Continue to follow with Dr Lamar Perez from neurosurgery      Would like patient to follow up in 6 months

## 2019-12-04 NOTE — ASSESSMENT & PLAN NOTE
Patient is s/p left sided CVA in October 2018  This was likely small vessel in origin due to uncontrolled vascular risk factors  He was started on Plavix (to replace ASA) and Lipitor increased at time of CVA  It does not seem his risk factors are well controlled at this time  His blood pressure is quite elevated today  His labs from April 2019 show elevated A1C at 9 8, and LDL of 163  Statin is not on his med list today and patient unsure why, does not recall any issues with it  I called [facility] and spoke with a nurse, Bryan Ruggiero  I asked why he was not on a statin  She put me on hold to look through notes and came back on the line and said she would call me back and continue to look through the notes  She also said he had labs done more recently in Sept 2019 and would fax them to our office  I am very concerned with his uncontrolled cardiovascular risk factors  He goes to [facility] and they should be checking his BP regularly there and making adjustments as needed  I am going to follow up with [facility] tomorrow and make sure he is seeing their doctor regularly there  We discussed signs and symptoms of stroke in detail today

## 2019-12-04 NOTE — ASSESSMENT & PLAN NOTE
Patient with long-standing MS, overall stable  He is not on IMD therapy  He has had progressive weakness in the LEs and now is mainly using the WC, does not ambulate much at all anymore  He is getting PT through [facility]  Exam is stable today

## 2019-12-04 NOTE — ASSESSMENT & PLAN NOTE
S/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 in March 2015 by Dr José Luis Moseley  He now follows with Dr Robert Goodman  Imaging due in 2020 per Dr Miladis Fernandez last note

## 2019-12-05 ENCOUNTER — TELEPHONE (OUTPATIENT)
Dept: NEUROLOGY | Facility: CLINIC | Age: 69
End: 2019-12-05

## 2019-12-05 NOTE — TELEPHONE ENCOUNTER
Late entry:    I called [facility] yesterday 12/4/19 and asked to speak with Ruba Gimenez, who I spoke with the day prior while patient was in the office  I left a message on her voicemail to call me back at the office

## 2019-12-23 ENCOUNTER — PROCEDURE VISIT (OUTPATIENT)
Dept: UROLOGY | Facility: CLINIC | Age: 69
End: 2019-12-23
Payer: MEDICARE

## 2019-12-23 VITALS — SYSTOLIC BLOOD PRESSURE: 150 MMHG | DIASTOLIC BLOOD PRESSURE: 80 MMHG | HEART RATE: 90 BPM

## 2019-12-23 DIAGNOSIS — N31.9 NEUROGENIC BLADDER: Primary | ICD-10-CM

## 2019-12-23 PROCEDURE — 52000 CYSTOURETHROSCOPY: CPT | Performed by: UROLOGY

## 2019-12-23 NOTE — PROGRESS NOTES
Cystoscopy  Date/Time: 12/23/2019 8:40 AM  Performed by: Maureen Sullivan MD  Authorized by: Maureen Sullivan MD         Abundio Toney is a 67-year-old male with neurogenic bladder  He previously underwent TURP for bladder outlet obstruction along with placement of a suprapubic tube at that time  He has never been able to void spontaneously since then  Unfortunately he developed a CVA recently  He is currently on anticoagulation  In May 2019 he went to the operating room where I removed multiple bladder stones  He returns in follow-up today for office cystoscopy  He has routine suprapubic tube changes performed outside of our office  Patient was placed supine  His existing suprapubic tube was removed  The tract was prepped and draped in sterile fashion  Flexible cystoscopy was then performed  The bladder was entered  Bladder was quite thick walled with trabeculation but with good capacity  The bladder neck was identified but the scope could not pass antegrade  He has known history of bladder neck contracture  Both ureteral orifices were visualized  There were no mucosal abnormalities or lesions otherwise  There were no bladder tumors identified  The bladder was left full and the cystoscope was removed  A new 24 Indonesian suprapubic tube was then inserted  The bladder was emptied  The balloon was inflated and the tube was capped  Impression:  Neurogenic bladder, history of indwelling suprapubic tube, history of bladder calculi status post cystolitholapaxy    Plan:  I recommend obtaining a basic metabolic panel as well as imaging of his upper tracts to assess for any possible stone disease with a retroperitoneal ultrasound  Assuming that these are within normal limits he will return in follow-up in 1 year for cystoscopy  He will continue have suprapubic tube exchanges every 6 weeks

## 2020-01-10 ENCOUNTER — HOSPITAL ENCOUNTER (OUTPATIENT)
Dept: ULTRASOUND IMAGING | Facility: HOSPITAL | Age: 70
Discharge: HOME | End: 2020-01-10
Attending: UROLOGY
Payer: MEDICARE

## 2020-01-10 DIAGNOSIS — N31.9 NEUROGENIC BLADDER: ICD-10-CM

## 2020-01-10 PROCEDURE — 76770 US EXAM ABDO BACK WALL COMP: CPT

## 2020-03-06 ENCOUNTER — TELEPHONE (OUTPATIENT)
Dept: NEUROLOGY | Facility: CLINIC | Age: 70
End: 2020-03-06

## 2020-03-06 NOTE — TELEPHONE ENCOUNTER
1st attempt to reschedule appointment from 3/23/2020 to 3/30, 4/1, or 4/3 with [facility] in [place]  No answer  LMOM

## 2020-03-11 NOTE — TELEPHONE ENCOUNTER
2nd attempt to reschedule appointment from 3/23/2020 to 3/30, 4/1, or 4/3 with Brendon Thompson in [place]  No answer  LMOM

## 2020-03-24 ENCOUNTER — TELEPHONE (OUTPATIENT)
Dept: NEUROLOGY | Facility: CLINIC | Age: 70
End: 2020-03-24

## 2020-03-24 NOTE — TELEPHONE ENCOUNTER
PT would like to cancel his appt due to COVID-19 fear  I offered to reschedule his appt far out in [place]  He denied he would like to be called to have his appt rescheduled once this virus is over

## 2020-03-25 NOTE — TELEPHONE ENCOUNTER
Called patient to offer virtual visit to have patient seen sooner  States he does not have any capabilities for video  He is agreeable to telephone visit  Rescheduled for 4/3 at 3:30pm with Dr Brendon Thompson  Patient verified best call back number is [phone]  Called , states they will call patient next week to verify appt and clarify  Informed patient  He verbalizes understanding of information  Answered patient's questions  No further questions or concerns at this time

## 2020-05-15 ENCOUNTER — HOSPITAL ENCOUNTER (EMERGENCY)
Facility: HOSPITAL | Age: 70
Discharge: HOME | End: 2020-05-15
Attending: EMERGENCY MEDICINE
Payer: MEDICARE

## 2020-05-15 ENCOUNTER — APPOINTMENT (EMERGENCY)
Dept: RADIOLOGY | Facility: HOSPITAL | Age: 70
End: 2020-05-15
Payer: MEDICARE

## 2020-05-15 VITALS
BODY MASS INDEX: 30.56 KG/M2 | HEART RATE: 99 BPM | SYSTOLIC BLOOD PRESSURE: 178 MMHG | RESPIRATION RATE: 18 BRPM | OXYGEN SATURATION: 94 % | DIASTOLIC BLOOD PRESSURE: 81 MMHG | WEIGHT: 183.64 LBS | TEMPERATURE: 99.2 F

## 2020-05-15 DIAGNOSIS — R05.9 COUGH: Primary | ICD-10-CM

## 2020-05-15 DIAGNOSIS — R06.02 SHORTNESS OF BREATH: ICD-10-CM

## 2020-05-15 LAB
ALBUMIN SERPL BCP-MCNC: 3.7 G/DL (ref 3.5–5)
ALP SERPL-CCNC: 103 U/L (ref 46–116)
ALT SERPL W P-5'-P-CCNC: 20 U/L (ref 12–78)
ANION GAP SERPL CALCULATED.3IONS-SCNC: 11 MMOL/L (ref 4–13)
APTT PPP: 27 SECONDS (ref 23–37)
AST SERPL W P-5'-P-CCNC: 13 U/L (ref 5–45)
ATRIAL RATE: 102 BPM
BASOPHILS # BLD AUTO: 0.09 THOUSANDS/ΜL (ref 0–0.1)
BASOPHILS NFR BLD AUTO: 1 % (ref 0–1)
BILIRUB SERPL-MCNC: 0.53 MG/DL (ref 0.2–1)
BUN SERPL-MCNC: 8 MG/DL (ref 5–25)
CALCIUM SERPL-MCNC: 9.7 MG/DL (ref 8.3–10.1)
CHLORIDE SERPL-SCNC: 97 MMOL/L (ref 100–108)
CO2 SERPL-SCNC: 27 MMOL/L (ref 21–32)
CREAT SERPL-MCNC: 0.91 MG/DL (ref 0.6–1.3)
D DIMER PPP FEU-MCNC: 0.32 UG/ML FEU
EOSINOPHIL # BLD AUTO: 0.4 THOUSAND/ΜL (ref 0–0.61)
EOSINOPHIL NFR BLD AUTO: 3 % (ref 0–6)
ERYTHROCYTE [DISTWIDTH] IN BLOOD BY AUTOMATED COUNT: 13.2 % (ref 11.6–15.1)
GFR SERPL CREATININE-BSD FRML MDRD: 85 ML/MIN/1.73SQ M
GLUCOSE SERPL-MCNC: 224 MG/DL (ref 65–140)
HCT VFR BLD AUTO: 47 % (ref 36.5–49.3)
HGB BLD-MCNC: 15.5 G/DL (ref 12–17)
IMM GRANULOCYTES # BLD AUTO: 0.05 THOUSAND/UL (ref 0–0.2)
IMM GRANULOCYTES NFR BLD AUTO: 0 % (ref 0–2)
INR PPP: 0.92 (ref 0.84–1.19)
LACTATE SERPL-SCNC: 1.7 MMOL/L (ref 0.5–2)
LYMPHOCYTES # BLD AUTO: 3.46 THOUSANDS/ΜL (ref 0.6–4.47)
LYMPHOCYTES NFR BLD AUTO: 28 % (ref 14–44)
MCH RBC QN AUTO: 28.6 PG (ref 26.8–34.3)
MCHC RBC AUTO-ENTMCNC: 33 G/DL (ref 31.4–37.4)
MCV RBC AUTO: 87 FL (ref 82–98)
MONOCYTES # BLD AUTO: 0.81 THOUSAND/ΜL (ref 0.17–1.22)
MONOCYTES NFR BLD AUTO: 7 % (ref 4–12)
NEUTROPHILS # BLD AUTO: 7.37 THOUSANDS/ΜL (ref 1.85–7.62)
NEUTS SEG NFR BLD AUTO: 61 % (ref 43–75)
NRBC BLD AUTO-RTO: 0 /100 WBCS
P AXIS: 62 DEGREES
PLATELET # BLD AUTO: 333 THOUSANDS/UL (ref 149–390)
PMV BLD AUTO: 8.5 FL (ref 8.9–12.7)
POTASSIUM SERPL-SCNC: 3.4 MMOL/L (ref 3.5–5.3)
PR INTERVAL: 162 MS
PROCALCITONIN SERPL-MCNC: <0.05 NG/ML
PROT SERPL-MCNC: 8.2 G/DL (ref 6.4–8.2)
PROTHROMBIN TIME: 12.4 SECONDS (ref 11.6–14.5)
QRS AXIS: 60 DEGREES
QRSD INTERVAL: 92 MS
QT INTERVAL: 358 MS
QTC INTERVAL: 466 MS
RBC # BLD AUTO: 5.42 MILLION/UL (ref 3.88–5.62)
SARS-COV-2 RNA RESP QL NAA+PROBE: NEGATIVE
SODIUM SERPL-SCNC: 135 MMOL/L (ref 136–145)
T WAVE AXIS: 34 DEGREES
TROPONIN I SERPL-MCNC: <0.02 NG/ML
VENTRICULAR RATE: 102 BPM
WBC # BLD AUTO: 12.18 THOUSAND/UL (ref 4.31–10.16)

## 2020-05-15 PROCEDURE — 80053 COMPREHEN METABOLIC PANEL: CPT | Performed by: PHYSICIAN ASSISTANT

## 2020-05-15 PROCEDURE — 85730 THROMBOPLASTIN TIME PARTIAL: CPT | Performed by: PHYSICIAN ASSISTANT

## 2020-05-15 PROCEDURE — 87040 BLOOD CULTURE FOR BACTERIA: CPT | Performed by: PHYSICIAN ASSISTANT

## 2020-05-15 PROCEDURE — 84484 ASSAY OF TROPONIN QUANT: CPT | Performed by: PHYSICIAN ASSISTANT

## 2020-05-15 PROCEDURE — 71045 X-RAY EXAM CHEST 1 VIEW: CPT

## 2020-05-15 PROCEDURE — 83605 ASSAY OF LACTIC ACID: CPT | Performed by: PHYSICIAN ASSISTANT

## 2020-05-15 PROCEDURE — 85379 FIBRIN DEGRADATION QUANT: CPT | Performed by: PHYSICIAN ASSISTANT

## 2020-05-15 PROCEDURE — 36415 COLL VENOUS BLD VENIPUNCTURE: CPT | Performed by: PHYSICIAN ASSISTANT

## 2020-05-15 PROCEDURE — 84145 PROCALCITONIN (PCT): CPT | Performed by: PHYSICIAN ASSISTANT

## 2020-05-15 PROCEDURE — 85610 PROTHROMBIN TIME: CPT | Performed by: PHYSICIAN ASSISTANT

## 2020-05-15 PROCEDURE — 93005 ELECTROCARDIOGRAM TRACING: CPT

## 2020-05-15 PROCEDURE — 85025 COMPLETE CBC W/AUTO DIFF WBC: CPT | Performed by: PHYSICIAN ASSISTANT

## 2020-05-15 PROCEDURE — 87635 SARS-COV-2 COVID-19 AMP PRB: CPT | Performed by: PHYSICIAN ASSISTANT

## 2020-05-15 PROCEDURE — 93010 ELECTROCARDIOGRAM REPORT: CPT | Performed by: INTERNAL MEDICINE

## 2020-05-15 PROCEDURE — 99284 EMERGENCY DEPT VISIT MOD MDM: CPT | Performed by: PHYSICIAN ASSISTANT

## 2020-05-15 PROCEDURE — 99285 EMERGENCY DEPT VISIT HI MDM: CPT

## 2020-05-15 RX ORDER — AZITHROMYCIN 250 MG/1
TABLET, FILM COATED ORAL
Qty: 6 TABLET | Refills: 0 | Status: SHIPPED | OUTPATIENT
Start: 2020-05-15 | End: 2020-05-19

## 2020-05-20 LAB
BACTERIA BLD CULT: NORMAL
BACTERIA BLD CULT: NORMAL

## 2020-08-06 ENCOUNTER — HOSPITAL ENCOUNTER (INPATIENT)
Facility: HOSPITAL | Age: 70
LOS: 2 days | Discharge: HOME HEALTH | DRG: 699 | End: 2020-08-09
Attending: EMERGENCY MEDICINE | Admitting: FAMILY MEDICINE
Payer: MEDICARE

## 2020-08-06 DIAGNOSIS — R00.0 TACHYCARDIA: ICD-10-CM

## 2020-08-06 DIAGNOSIS — Z93.59 CHRONIC SUPRAPUBIC CATHETER (HCC): ICD-10-CM

## 2020-08-06 DIAGNOSIS — K86.89 PANCREATIC MASS: ICD-10-CM

## 2020-08-06 DIAGNOSIS — R10.84 GENERALIZED ABDOMINAL PAIN: ICD-10-CM

## 2020-08-06 DIAGNOSIS — N39.0 UTI (URINARY TRACT INFECTION): Primary | ICD-10-CM

## 2020-08-06 DIAGNOSIS — R11.0 NAUSEA: ICD-10-CM

## 2020-08-06 DIAGNOSIS — N39.0 URINARY TRACT INFECTION ASSOCIATED WITH INDWELLING URETHRAL CATHETER, INITIAL ENCOUNTER (HCC): ICD-10-CM

## 2020-08-06 DIAGNOSIS — R53.1 GENERALIZED WEAKNESS: ICD-10-CM

## 2020-08-06 DIAGNOSIS — E11.9 DIABETES MELLITUS (HCC): ICD-10-CM

## 2020-08-06 DIAGNOSIS — K59.00 CONSTIPATION, UNSPECIFIED CONSTIPATION TYPE: ICD-10-CM

## 2020-08-06 DIAGNOSIS — T83.511A URINARY TRACT INFECTION ASSOCIATED WITH INDWELLING URETHRAL CATHETER, INITIAL ENCOUNTER (HCC): ICD-10-CM

## 2020-08-06 DIAGNOSIS — G35 MULTIPLE SCLEROSIS (HCC): ICD-10-CM

## 2020-08-06 PROCEDURE — 93005 ELECTROCARDIOGRAM TRACING: CPT

## 2020-08-06 PROCEDURE — 99285 EMERGENCY DEPT VISIT HI MDM: CPT

## 2020-08-06 PROCEDURE — 99285 EMERGENCY DEPT VISIT HI MDM: CPT | Performed by: EMERGENCY MEDICINE

## 2020-08-06 PROCEDURE — 87040 BLOOD CULTURE FOR BACTERIA: CPT | Performed by: NURSE PRACTITIONER

## 2020-08-07 ENCOUNTER — APPOINTMENT (EMERGENCY)
Dept: CT IMAGING | Facility: HOSPITAL | Age: 70
DRG: 699 | End: 2020-08-07
Payer: MEDICARE

## 2020-08-07 PROBLEM — K86.89 PANCREATIC MASS: Status: ACTIVE | Noted: 2020-08-07

## 2020-08-07 PROBLEM — K86.9 PANCREATIC LESION: Status: ACTIVE | Noted: 2020-08-07

## 2020-08-07 LAB
ALBUMIN SERPL BCP-MCNC: 3.4 G/DL (ref 3.5–5)
ALP SERPL-CCNC: 106 U/L (ref 46–116)
ALT SERPL W P-5'-P-CCNC: 21 U/L (ref 12–78)
ANION GAP SERPL CALCULATED.3IONS-SCNC: 10 MMOL/L (ref 4–13)
ANION GAP SERPL CALCULATED.3IONS-SCNC: 10 MMOL/L (ref 4–13)
APTT PPP: 27 SECONDS (ref 23–37)
AST SERPL W P-5'-P-CCNC: 12 U/L (ref 5–45)
ATRIAL RATE: 117 BPM
BACTERIA UR QL AUTO: ABNORMAL /HPF
BASOPHILS # BLD AUTO: 0.12 THOUSANDS/ΜL (ref 0–0.1)
BASOPHILS NFR BLD AUTO: 1 % (ref 0–1)
BILIRUB SERPL-MCNC: 0.54 MG/DL (ref 0.2–1)
BILIRUB UR QL STRIP: NEGATIVE
BUN SERPL-MCNC: 10 MG/DL (ref 5–25)
BUN SERPL-MCNC: 9 MG/DL (ref 5–25)
CALCIUM SERPL-MCNC: 9.1 MG/DL (ref 8.3–10.1)
CALCIUM SERPL-MCNC: 9.5 MG/DL (ref 8.3–10.1)
CHLORIDE SERPL-SCNC: 97 MMOL/L (ref 100–108)
CHLORIDE SERPL-SCNC: 98 MMOL/L (ref 100–108)
CK SERPL-CCNC: 51 U/L (ref 39–308)
CLARITY UR: ABNORMAL
CO2 SERPL-SCNC: 25 MMOL/L (ref 21–32)
CO2 SERPL-SCNC: 26 MMOL/L (ref 21–32)
COLOR UR: YELLOW
CREAT SERPL-MCNC: 0.92 MG/DL (ref 0.6–1.3)
CREAT SERPL-MCNC: 1.07 MG/DL (ref 0.6–1.3)
EOSINOPHIL # BLD AUTO: 0.21 THOUSAND/ΜL (ref 0–0.61)
EOSINOPHIL NFR BLD AUTO: 2 % (ref 0–6)
ERYTHROCYTE [DISTWIDTH] IN BLOOD BY AUTOMATED COUNT: 13 % (ref 11.6–15.1)
ERYTHROCYTE [DISTWIDTH] IN BLOOD BY AUTOMATED COUNT: 13 % (ref 11.6–15.1)
EST. AVERAGE GLUCOSE BLD GHB EST-MCNC: 229 MG/DL
GFR SERPL CREATININE-BSD FRML MDRD: 69 ML/MIN/1.73SQ M
GFR SERPL CREATININE-BSD FRML MDRD: 83 ML/MIN/1.73SQ M
GLUCOSE SERPL-MCNC: 159 MG/DL (ref 65–140)
GLUCOSE SERPL-MCNC: 183 MG/DL (ref 65–140)
GLUCOSE SERPL-MCNC: 186 MG/DL (ref 65–140)
GLUCOSE SERPL-MCNC: 197 MG/DL (ref 65–140)
GLUCOSE SERPL-MCNC: 198 MG/DL (ref 65–140)
GLUCOSE SERPL-MCNC: 211 MG/DL (ref 65–140)
GLUCOSE SERPL-MCNC: 254 MG/DL (ref 65–140)
GLUCOSE UR STRIP-MCNC: ABNORMAL MG/DL
HBA1C MFR BLD: 9.6 %
HCT VFR BLD AUTO: 45.9 % (ref 36.5–49.3)
HCT VFR BLD AUTO: 50 % (ref 36.5–49.3)
HGB BLD-MCNC: 15.3 G/DL (ref 12–17)
HGB BLD-MCNC: 16.7 G/DL (ref 12–17)
HGB UR QL STRIP.AUTO: ABNORMAL
IMM GRANULOCYTES # BLD AUTO: 0.05 THOUSAND/UL (ref 0–0.2)
IMM GRANULOCYTES NFR BLD AUTO: 0 % (ref 0–2)
INR PPP: 0.92 (ref 0.84–1.19)
KETONES UR STRIP-MCNC: ABNORMAL MG/DL
LACTATE SERPL-SCNC: 1.3 MMOL/L (ref 0.5–2)
LEUKOCYTE ESTERASE UR QL STRIP: ABNORMAL
LYMPHOCYTES # BLD AUTO: 2.05 THOUSANDS/ΜL (ref 0.6–4.47)
LYMPHOCYTES NFR BLD AUTO: 15 % (ref 14–44)
MAGNESIUM SERPL-MCNC: 1.7 MG/DL (ref 1.6–2.6)
MCH RBC QN AUTO: 29.1 PG (ref 26.8–34.3)
MCH RBC QN AUTO: 29.1 PG (ref 26.8–34.3)
MCHC RBC AUTO-ENTMCNC: 33.3 G/DL (ref 31.4–37.4)
MCHC RBC AUTO-ENTMCNC: 33.4 G/DL (ref 31.4–37.4)
MCV RBC AUTO: 87 FL (ref 82–98)
MCV RBC AUTO: 87 FL (ref 82–98)
MONOCYTES # BLD AUTO: 1.04 THOUSAND/ΜL (ref 0.17–1.22)
MONOCYTES NFR BLD AUTO: 8 % (ref 4–12)
NEUTROPHILS # BLD AUTO: 10.03 THOUSANDS/ΜL (ref 1.85–7.62)
NEUTS SEG NFR BLD AUTO: 74 % (ref 43–75)
NITRITE UR QL STRIP: POSITIVE
NON-SQ EPI CELLS URNS QL MICRO: ABNORMAL /HPF
NRBC BLD AUTO-RTO: 0 /100 WBCS
P AXIS: 71 DEGREES
PH UR STRIP.AUTO: 7.5 [PH]
PLATELET # BLD AUTO: 314 THOUSANDS/UL (ref 149–390)
PLATELET # BLD AUTO: 361 THOUSANDS/UL (ref 149–390)
PMV BLD AUTO: 8.2 FL (ref 8.9–12.7)
PMV BLD AUTO: 8.3 FL (ref 8.9–12.7)
POTASSIUM SERPL-SCNC: 3.9 MMOL/L (ref 3.5–5.3)
POTASSIUM SERPL-SCNC: 4.1 MMOL/L (ref 3.5–5.3)
PR INTERVAL: 156 MS
PROT SERPL-MCNC: 8.1 G/DL (ref 6.4–8.2)
PROT UR STRIP-MCNC: ABNORMAL MG/DL
PROTHROMBIN TIME: 12.1 SECONDS (ref 11.6–14.5)
QRS AXIS: 66 DEGREES
QRSD INTERVAL: 90 MS
QT INTERVAL: 328 MS
QTC INTERVAL: 457 MS
RBC # BLD AUTO: 5.25 MILLION/UL (ref 3.88–5.62)
RBC # BLD AUTO: 5.74 MILLION/UL (ref 3.88–5.62)
RBC #/AREA URNS AUTO: ABNORMAL /HPF
SODIUM SERPL-SCNC: 133 MMOL/L (ref 136–145)
SODIUM SERPL-SCNC: 133 MMOL/L (ref 136–145)
SP GR UR STRIP.AUTO: 1.02 (ref 1–1.03)
T WAVE AXIS: 55 DEGREES
TROPONIN I SERPL-MCNC: <0.02 NG/ML
UROBILINOGEN UR QL STRIP.AUTO: 0.2 E.U./DL
VENTRICULAR RATE: 117 BPM
WBC # BLD AUTO: 12.18 THOUSAND/UL (ref 4.31–10.16)
WBC # BLD AUTO: 13.5 THOUSAND/UL (ref 4.31–10.16)
WBC #/AREA URNS AUTO: ABNORMAL /HPF

## 2020-08-07 PROCEDURE — 97530 THERAPEUTIC ACTIVITIES: CPT

## 2020-08-07 PROCEDURE — 82550 ASSAY OF CK (CPK): CPT | Performed by: NURSE PRACTITIONER

## 2020-08-07 PROCEDURE — 87186 SC STD MICRODIL/AGAR DIL: CPT | Performed by: NURSE PRACTITIONER

## 2020-08-07 PROCEDURE — 87086 URINE CULTURE/COLONY COUNT: CPT | Performed by: NURSE PRACTITIONER

## 2020-08-07 PROCEDURE — 96361 HYDRATE IV INFUSION ADD-ON: CPT

## 2020-08-07 PROCEDURE — 87147 CULTURE TYPE IMMUNOLOGIC: CPT | Performed by: NURSE PRACTITIONER

## 2020-08-07 PROCEDURE — 99223 1ST HOSP IP/OBS HIGH 75: CPT | Performed by: PHYSICIAN ASSISTANT

## 2020-08-07 PROCEDURE — 74177 CT ABD & PELVIS W/CONTRAST: CPT

## 2020-08-07 PROCEDURE — 85025 COMPLETE CBC W/AUTO DIFF WBC: CPT | Performed by: NURSE PRACTITIONER

## 2020-08-07 PROCEDURE — 80053 COMPREHEN METABOLIC PANEL: CPT | Performed by: NURSE PRACTITIONER

## 2020-08-07 PROCEDURE — 96365 THER/PROPH/DIAG IV INF INIT: CPT

## 2020-08-07 PROCEDURE — 93010 ELECTROCARDIOGRAM REPORT: CPT | Performed by: INTERNAL MEDICINE

## 2020-08-07 PROCEDURE — 85027 COMPLETE CBC AUTOMATED: CPT | Performed by: PHYSICIAN ASSISTANT

## 2020-08-07 PROCEDURE — NC001 PR NO CHARGE: Performed by: PHYSICIAN ASSISTANT

## 2020-08-07 PROCEDURE — 80048 BASIC METABOLIC PNL TOTAL CA: CPT | Performed by: PHYSICIAN ASSISTANT

## 2020-08-07 PROCEDURE — 99222 1ST HOSP IP/OBS MODERATE 55: CPT | Performed by: PHYSICIAN ASSISTANT

## 2020-08-07 PROCEDURE — 82948 REAGENT STRIP/BLOOD GLUCOSE: CPT

## 2020-08-07 PROCEDURE — 0T2BX0Z CHANGE DRAINAGE DEVICE IN BLADDER, EXTERNAL APPROACH: ICD-10-PCS | Performed by: UROLOGY

## 2020-08-07 PROCEDURE — 81001 URINALYSIS AUTO W/SCOPE: CPT | Performed by: NURSE PRACTITIONER

## 2020-08-07 PROCEDURE — 84484 ASSAY OF TROPONIN QUANT: CPT | Performed by: NURSE PRACTITIONER

## 2020-08-07 PROCEDURE — 51705 CHANGE OF BLADDER TUBE: CPT | Performed by: PHYSICIAN ASSISTANT

## 2020-08-07 PROCEDURE — 83036 HEMOGLOBIN GLYCOSYLATED A1C: CPT | Performed by: PHYSICIAN ASSISTANT

## 2020-08-07 PROCEDURE — 85610 PROTHROMBIN TIME: CPT | Performed by: NURSE PRACTITIONER

## 2020-08-07 PROCEDURE — 85730 THROMBOPLASTIN TIME PARTIAL: CPT | Performed by: NURSE PRACTITIONER

## 2020-08-07 PROCEDURE — 97163 PT EVAL HIGH COMPLEX 45 MIN: CPT

## 2020-08-07 PROCEDURE — 87077 CULTURE AEROBIC IDENTIFY: CPT | Performed by: NURSE PRACTITIONER

## 2020-08-07 PROCEDURE — 96375 TX/PRO/DX INJ NEW DRUG ADDON: CPT

## 2020-08-07 PROCEDURE — 83605 ASSAY OF LACTIC ACID: CPT | Performed by: NURSE PRACTITIONER

## 2020-08-07 PROCEDURE — 97167 OT EVAL HIGH COMPLEX 60 MIN: CPT

## 2020-08-07 PROCEDURE — 36415 COLL VENOUS BLD VENIPUNCTURE: CPT | Performed by: NURSE PRACTITIONER

## 2020-08-07 PROCEDURE — 87040 BLOOD CULTURE FOR BACTERIA: CPT | Performed by: NURSE PRACTITIONER

## 2020-08-07 PROCEDURE — 83735 ASSAY OF MAGNESIUM: CPT | Performed by: NURSE PRACTITIONER

## 2020-08-07 PROCEDURE — G1004 CDSM NDSC: HCPCS

## 2020-08-07 RX ORDER — GUAIFENESIN/DEXTROMETHORPHAN 100-10MG/5
10 SYRUP ORAL EVERY 6 HOURS PRN
Status: DISCONTINUED | OUTPATIENT
Start: 2020-08-07 | End: 2020-08-09 | Stop reason: HOSPADM

## 2020-08-07 RX ORDER — AMLODIPINE BESYLATE 10 MG/1
10 TABLET ORAL DAILY
Status: DISCONTINUED | OUTPATIENT
Start: 2020-08-07 | End: 2020-08-09 | Stop reason: HOSPADM

## 2020-08-07 RX ORDER — CIPROFLOXACIN 2 MG/ML
400 INJECTION, SOLUTION INTRAVENOUS EVERY 12 HOURS
Status: DISCONTINUED | OUTPATIENT
Start: 2020-08-07 | End: 2020-08-09 | Stop reason: HOSPADM

## 2020-08-07 RX ORDER — ONDANSETRON 2 MG/ML
4 INJECTION INTRAMUSCULAR; INTRAVENOUS EVERY 6 HOURS PRN
Status: DISCONTINUED | OUTPATIENT
Start: 2020-08-07 | End: 2020-08-09 | Stop reason: HOSPADM

## 2020-08-07 RX ORDER — ACETAMINOPHEN 325 MG/1
650 TABLET ORAL EVERY 6 HOURS PRN
Status: DISCONTINUED | OUTPATIENT
Start: 2020-08-07 | End: 2020-08-09 | Stop reason: HOSPADM

## 2020-08-07 RX ORDER — ONDANSETRON 2 MG/ML
4 INJECTION INTRAMUSCULAR; INTRAVENOUS ONCE
Status: COMPLETED | OUTPATIENT
Start: 2020-08-07 | End: 2020-08-07

## 2020-08-07 RX ORDER — ATORVASTATIN CALCIUM 20 MG/1
20 TABLET, FILM COATED ORAL
Status: DISCONTINUED | OUTPATIENT
Start: 2020-08-07 | End: 2020-08-09 | Stop reason: HOSPADM

## 2020-08-07 RX ORDER — POLYETHYLENE GLYCOL 3350 17 G/17G
17 POWDER, FOR SOLUTION ORAL DAILY
Status: DISCONTINUED | OUTPATIENT
Start: 2020-08-07 | End: 2020-08-09 | Stop reason: HOSPADM

## 2020-08-07 RX ORDER — SENNOSIDES 8.6 MG
1 TABLET ORAL DAILY
Status: DISCONTINUED | OUTPATIENT
Start: 2020-08-07 | End: 2020-08-09 | Stop reason: HOSPADM

## 2020-08-07 RX ORDER — PANTOPRAZOLE SODIUM 40 MG/1
40 TABLET, DELAYED RELEASE ORAL
Status: DISCONTINUED | OUTPATIENT
Start: 2020-08-07 | End: 2020-08-09 | Stop reason: HOSPADM

## 2020-08-07 RX ORDER — CALCIUM CARBONATE 200(500)MG
1000 TABLET,CHEWABLE ORAL DAILY PRN
Status: DISCONTINUED | OUTPATIENT
Start: 2020-08-07 | End: 2020-08-09 | Stop reason: HOSPADM

## 2020-08-07 RX ORDER — LOSARTAN POTASSIUM 50 MG/1
50 TABLET ORAL DAILY
Status: DISCONTINUED | OUTPATIENT
Start: 2020-08-07 | End: 2020-08-09 | Stop reason: HOSPADM

## 2020-08-07 RX ORDER — GABAPENTIN 300 MG/1
600 CAPSULE ORAL
Status: DISCONTINUED | OUTPATIENT
Start: 2020-08-07 | End: 2020-08-09 | Stop reason: HOSPADM

## 2020-08-07 RX ORDER — CIPROFLOXACIN 2 MG/ML
400 INJECTION, SOLUTION INTRAVENOUS ONCE
Status: COMPLETED | OUTPATIENT
Start: 2020-08-07 | End: 2020-08-07

## 2020-08-07 RX ORDER — CLOPIDOGREL BISULFATE 75 MG/1
75 TABLET ORAL DAILY
Status: DISCONTINUED | OUTPATIENT
Start: 2020-08-07 | End: 2020-08-09 | Stop reason: HOSPADM

## 2020-08-07 RX ORDER — FUROSEMIDE 40 MG/1
40 TABLET ORAL DAILY
Status: DISCONTINUED | OUTPATIENT
Start: 2020-08-07 | End: 2020-08-09 | Stop reason: HOSPADM

## 2020-08-07 RX ORDER — ALPRAZOLAM 0.25 MG/1
0.25 TABLET ORAL
Status: DISCONTINUED | OUTPATIENT
Start: 2020-08-07 | End: 2020-08-09 | Stop reason: HOSPADM

## 2020-08-07 RX ORDER — PROPRANOLOL HCL 60 MG
60 CAPSULE, EXTENDED RELEASE 24HR ORAL DAILY
Status: DISCONTINUED | OUTPATIENT
Start: 2020-08-07 | End: 2020-08-09 | Stop reason: HOSPADM

## 2020-08-07 RX ORDER — BISACODYL 10 MG
10 SUPPOSITORY, RECTAL RECTAL DAILY PRN
Status: DISCONTINUED | OUTPATIENT
Start: 2020-08-07 | End: 2020-08-09 | Stop reason: HOSPADM

## 2020-08-07 RX ORDER — SODIUM CHLORIDE 9 MG/ML
50 INJECTION, SOLUTION INTRAVENOUS CONTINUOUS
Status: DISCONTINUED | OUTPATIENT
Start: 2020-08-07 | End: 2020-08-09 | Stop reason: HOSPADM

## 2020-08-07 RX ORDER — GABAPENTIN 300 MG/1
300 CAPSULE ORAL 2 TIMES DAILY
Status: DISCONTINUED | OUTPATIENT
Start: 2020-08-07 | End: 2020-08-09 | Stop reason: HOSPADM

## 2020-08-07 RX ORDER — ALBUTEROL SULFATE 90 UG/1
2 AEROSOL, METERED RESPIRATORY (INHALATION) EVERY 6 HOURS PRN
Status: DISCONTINUED | OUTPATIENT
Start: 2020-08-07 | End: 2020-08-09 | Stop reason: HOSPADM

## 2020-08-07 RX ADMIN — INSULIN LISPRO 1 UNITS: 100 INJECTION, SOLUTION INTRAVENOUS; SUBCUTANEOUS at 08:31

## 2020-08-07 RX ADMIN — IOHEXOL 100 ML: 350 INJECTION, SOLUTION INTRAVENOUS at 01:55

## 2020-08-07 RX ADMIN — PANTOPRAZOLE SODIUM 40 MG: 40 TABLET, DELAYED RELEASE ORAL at 06:02

## 2020-08-07 RX ADMIN — AMLODIPINE BESYLATE 10 MG: 10 TABLET ORAL at 08:28

## 2020-08-07 RX ADMIN — INSULIN LISPRO 2 UNITS: 100 INJECTION, SOLUTION INTRAVENOUS; SUBCUTANEOUS at 16:40

## 2020-08-07 RX ADMIN — INSULIN LISPRO 3 UNITS: 100 INJECTION, SOLUTION INTRAVENOUS; SUBCUTANEOUS at 11:43

## 2020-08-07 RX ADMIN — ENOXAPARIN SODIUM 40 MG: 40 INJECTION SUBCUTANEOUS at 08:29

## 2020-08-07 RX ADMIN — ATORVASTATIN CALCIUM 20 MG: 20 TABLET, FILM COATED ORAL at 21:27

## 2020-08-07 RX ADMIN — INSULIN LISPRO 1 UNITS: 100 INJECTION, SOLUTION INTRAVENOUS; SUBCUTANEOUS at 21:32

## 2020-08-07 RX ADMIN — CLOPIDOGREL BISULFATE 75 MG: 75 TABLET ORAL at 08:28

## 2020-08-07 RX ADMIN — GABAPENTIN 600 MG: 300 CAPSULE ORAL at 21:27

## 2020-08-07 RX ADMIN — ACETAMINOPHEN 650 MG: 325 TABLET ORAL at 03:58

## 2020-08-07 RX ADMIN — POLYETHYLENE GLYCOL 3350 17 G: 17 POWDER, FOR SOLUTION ORAL at 08:29

## 2020-08-07 RX ADMIN — CIPROFLOXACIN 400 MG: 2 INJECTION, SOLUTION INTRAVENOUS at 14:05

## 2020-08-07 RX ADMIN — SENNOSIDES 8.6 MG: 8.6 TABLET ORAL at 08:28

## 2020-08-07 RX ADMIN — GABAPENTIN 300 MG: 300 CAPSULE ORAL at 14:05

## 2020-08-07 RX ADMIN — GABAPENTIN 300 MG: 300 CAPSULE ORAL at 08:28

## 2020-08-07 RX ADMIN — ONDANSETRON 4 MG: 2 INJECTION INTRAMUSCULAR; INTRAVENOUS at 00:30

## 2020-08-07 RX ADMIN — PANTOPRAZOLE SODIUM 40 MG: 40 TABLET, DELAYED RELEASE ORAL at 16:41

## 2020-08-07 RX ADMIN — PROPRANOLOL HYDROCHLORIDE 60 MG: 60 CAPSULE, EXTENDED RELEASE ORAL at 08:31

## 2020-08-07 RX ADMIN — LOSARTAN POTASSIUM 50 MG: 50 TABLET, FILM COATED ORAL at 08:28

## 2020-08-07 RX ADMIN — ONDANSETRON 4 MG: 2 INJECTION INTRAMUSCULAR; INTRAVENOUS at 03:58

## 2020-08-07 RX ADMIN — CIPROFLOXACIN 400 MG: 2 INJECTION, SOLUTION INTRAVENOUS at 01:39

## 2020-08-07 RX ADMIN — SODIUM CHLORIDE 50 ML/HR: 0.9 INJECTION, SOLUTION INTRAVENOUS at 00:29

## 2020-08-07 RX ADMIN — FUROSEMIDE 40 MG: 40 TABLET ORAL at 08:28

## 2020-08-07 NOTE — ASSESSMENT & PLAN NOTE
Admit to med/surg  Patient with hx of neurogenic bladder due to MS  Has chronic supra pubic cath  Started on cipro due to antibiotic allergies  Consult urology  CT shows cystitis/pyelo  WBC 13 50

## 2020-08-07 NOTE — H&P
H&P- Leatha Kendrick 1950, 69 y o  male MRN: [id number]    Unit/Bed#: E5 -01 Encounter: [id number]    Primary Care Provider: Leanora Sicard, DO   Date and time admitted to hospital: 2020 11:33 PM        * Urinary tract infection  Assessment & Plan  Admit to med/surg  Patient with hx of neurogenic bladder due to MS  Has chronic supra pubic cath  Started on cipro due to antibiotic allergies  Consult urology  CT shows cystitis/pyelo  WBC 13 50    Pancreatic mass  Assessment & Plan  Incidental finding on CT  Pt states he has a sister who  from pancreatic cancer  Will need MRI either inpt or outpt    History of CVA (cerebrovascular accident)  Assessment & Plan  Hx CVA 10/18    Diabetes mellitus ([address] )  Assessment & Plan  Lab Results   Component Value Date    HGBA1C 9 7 2019       No results for input(s): POCGLU in the last 72 hours  Order accuchecks with sliding scale      Multiple sclerosis ([facility] )  Assessment & Plan  Recent worsening of leg weakness, now wheelchair bound            VTE Prophylaxis: Enoxaparin (Lovenox)  / sequential compression device   Code Status: full code  POLST: There is no POLST form on file for this patient (pre-hospital)  Discussion with family: no family at bedside    Anticipated Length of Stay:  Patient will be admitted on an Inpatient basis with an anticipated length of stay of  Greater than 2 midnights  Justification for Hospital Stay: patient requires IV antibiotics    Total Time for Visit, including Counseling / Coordination of Care: 45 minutes  Greater than 50% of this total time spent on direct patient counseling and coordination of care  Chief Complaint:   Abdominal pain    History of Present Illness:    Leatha Kendrick is a 69 y o  male who presents with abdominal pain  Patient with hx of MS and neurogenic bladder  Has suprapubic cath  He states he has been generally weak and has abdominal pain  He has been constipated   He is wheelchair bound at this point  Review of Systems:    Review of Systems   Constitutional: Positive for activity change and fatigue  HENT: Negative  Eyes: Negative  Respiratory: Negative  Cardiovascular: Negative  Gastrointestinal: Positive for abdominal pain  Endocrine: Negative  Genitourinary:        Supra pubic cath   Musculoskeletal: Positive for gait problem  Skin: Negative  Allergic/Immunologic: Negative  Neurological: Positive for weakness  Hematological: Negative  Psychiatric/Behavioral: Negative  Past Medical and Surgical History:     Past Medical History:   Diagnosis Date    Acute laryngitis     Acute nonsuppurative otitis media, unspecified laterality     Arm weakness     Arthritis     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HCC)     Bladder infection     Bronchitis     Constipation     Cough     Diabetes mellitus (HCC)     Dizziness     Dysfunction of eustachian tube     Erectile dysfunction of non-organic origin     Fatigue     Glaucoma     Hiatal hernia     Hypertension     Imbalance     Leg muscle spasm     [facility])     Nephrolithiasis     No natural teeth     Sinus pain     Spinal stenosis     Strain of thoracic region     Stroke ([address] )     Suprapubic catheter ([address] )        Past Surgical History:   Procedure Laterality Date    APPENDECTOMY      BRAIN SURGERY      Coil placed in aneurysm    CYSTOSCOPY      CYSTOSCOPY      CYSTOSCOPY  06/11/2018    EYE SURGERY      transscleral cyclophotocoagulation noncontact YAG laser    MYRINGOTOMY      with ventilation tube insertion    CT REMOVE BLADDER STONE,<2 5 CM N/A 5/7/2019    Procedure: CYSTOSCOPY, holmium laser litholapaxy of bladder stones, EXCHANGE OF SP TUBE;  Surgeon: Kar Perez MD;  [facility];  Service: Urology    SUPRAPUBIC CATHETER INSERTION         Meds/Allergies:    Prior to Admission medications    Medication Sig Start Date End Date Taking?  Authorizing Provider albuterol (PROVENTIL HFA,VENTOLIN HFA) 90 mcg/act inhaler Inhale 2 puffs every 6 (six) hours as needed for wheezing   Yes Historical Provider, MD   ALPRAZolam Merlinda Sang) 0 25 mg tablet Take 0 25 mg by mouth daily at bedtime as needed for anxiety or sleep     Yes Historical Provider, MD   amLODIPine (NORVASC) 10 mg tablet Take 10 mg by mouth daily  Yes Historical Provider, MD   atorvastatin (LIPITOR) 20 mg tablet Take 20 mg by mouth daily at bedtime  Yes Historical Provider, MD   baclofen 10 mg tablet Take 2 5 mg by mouth 2 (two) times a day Takes 1 tablet in am and 1 tablet @ 2pm   Yes Historical Provider, MD   clopidogrel (PLAVIX) 75 mg tablet Take 1 tablet (75 mg total) by mouth daily 10/27/18  Yes Yolanda Carlos MD   dextromethorphan-guaifenesin [facility]\"" DM)  MG per 12 hr tablet Take 1 tablet by mouth every 12 (twelve) hours as needed for cough 5/15/20  Yes Sada Pan PA-C   furosemide (LASIX) 20 mg tablet Take 40 mg by mouth daily     Yes Historical Provider, MD   gabapentin (NEURONTIN) 300 mg capsule Take 1 capsule (300 mg total) by mouth 2 (two) times a day AND 2 capsules (600 mg total) daily at bedtime  4/15/19  Yes Ted Snyder MD   metFORMIN (GLUCOPHAGE) 500 mg tablet Take 1,000 mg by mouth 2 (two) times a day with meals     Yes Historical Provider, MD   pantoprazole (PROTONIX) 40 mg tablet TAKE 1 TABLET TWICE DAILY 30 MINUTES BEFORE BREAKFAST AND DINNER  3/13/18  Yes Susan Gar,    sitaGLIPtin (JANUVIA) 100 mg tablet Take 100 mg by mouth daily   Yes Historical Provider, MD   brimonidine-timolol (COMBIGAN) 0 2-0 5 % Administer 1 drop to both eyes every 12 (twelve) hours    Historical Provider, MD   linaGLIPtin 5 MG TABS Take 5 mg by mouth daily    Historical Provider, MD   losartan (COZAAR) 50 mg tablet Take 50 mg by mouth daily      Historical Provider, MD   potassium chloride (K-DUR,KLOR-CON) 10 mEq tablet Take 1 tablet (10 mEq total) by mouth daily  Patient not taking: Reported on 8/7/2020 10/27/18   Aylsa Desai MD   propranolol (INDERAL LA) 60 mg 24 hr capsule TAKE 1 CAPSULE DAILY 4/2/18   Jonathon Peng DO   sucralfate (CARAFATE) 1 g tablet Take 1 tablet (1 g total) by mouth 2 (two) times a day  Patient not taking: Reported on 8/7/2020 10/26/18   Alysa Desai MD     I have reviewed home medications with patient personally  Allergies: Allergies   Allergen Reactions    Cephalexin Rash       Social History:     Marital Status: Single   Occupation: retired  Patient Pre-hospital Living Situation: lives with brother and sister  Patient Pre-hospital Level of Mobility: wheelchair bound  Patient Pre-hospital Diet Restrictions: none  Substance Use History:   Social History     Substance and Sexual Activity   Alcohol Use Not Currently     Social History     Tobacco Use   Smoking Status Former Smoker    Types: Cigarettes   Smokeless Tobacco Never Used   Tobacco Comment    smoked for 15-20 years, stopped about 25 years ago     Social History     Substance and Sexual Activity   Drug Use No       Family History:    non-contributory    Physical Exam:     Vitals:   Blood Pressure: 153/69 (08/07/20 0330)  Pulse: 91 (08/07/20 0330)  Temperature: 97 7 °F (36 5 °C) (08/07/20 0330)  Temp Source: Temporal (08/07/20 0330)  Respirations: 16 (08/07/20 0330)  SpO2: 94 % (08/07/20 0330)    Physical Exam  Vitals signs reviewed  Constitutional:       Appearance: He is normal weight  HENT:      Head: Normocephalic and atraumatic  Nose: Nose normal       Mouth/Throat:      Mouth: Mucous membranes are moist    Eyes:      Extraocular Movements: Extraocular movements intact  Pupils: Pupils are equal, round, and reactive to light  Neck:      Musculoskeletal: Normal range of motion  Cardiovascular:      Rate and Rhythm: Normal rate and regular rhythm  Pulmonary:      Effort: Pulmonary effort is normal       Breath sounds: Normal breath sounds     Abdominal:      General: Abdomen is flat  Bowel sounds are normal       Palpations: Abdomen is soft  Genitourinary:     Comments: Supra pubic cath in place  Musculoskeletal: Normal range of motion  General: No swelling or tenderness  Skin:     General: Skin is warm and dry  Neurological:      General: No focal deficit present  Mental Status: He is alert and oriented to person, place, and time  Psychiatric:         Mood and Affect: Mood normal          Behavior: Behavior normal              Additional Data:     Lab Results: I have personally reviewed pertinent reports  Results from last 7 days   Lab Units 08/07/20  0505 08/07/20  0000   WBC Thousand/uL 12 18* 13 50*   HEMOGLOBIN g/dL 15 3 16 7   HEMATOCRIT % 45 9 50 0*   PLATELETS Thousands/uL 314 361   NEUTROS PCT %  --  74   LYMPHS PCT %  --  15   MONOS PCT %  --  8   EOS PCT %  --  2     Results from last 7 days   Lab Units 08/07/20  0505 08/07/20  0000   SODIUM mmol/L 133* 133*   POTASSIUM mmol/L 4 1 3 9   CHLORIDE mmol/L 98* 97*   CO2 mmol/L 25 26   BUN mg/dL 9 10   CREATININE mg/dL 1 07 0 92   ANION GAP mmol/L 10 10   CALCIUM mg/dL 9 1 9 5   ALBUMIN g/dL  --  3 4*   TOTAL BILIRUBIN mg/dL  --  0 54   ALK PHOS U/L  --  106   ALT U/L  --  21   AST U/L  --  12   GLUCOSE RANDOM mg/dL 197* 211*     Results from last 7 days   Lab Units 08/07/20  0000   INR  0 92             Results from last 7 days   Lab Units 08/07/20  0000   LACTIC ACID mmol/L 1 3       Imaging: I have personally reviewed pertinent reports  CT abdomen pelvis with contrast   Final Result by Joselyn Huizar MD (08/07 0240)      The urinary bladder is decompressed by a suprapubic catheter  The wall of the urinary bladder appears thickened and somewhat edematous  Additionally, there is some urothelial enhancement involving the right ureter  These findings suggest urinary tract    infection/cystitis and possibly pyelonephritis    Clinical correlation, laboratory correlation and follow-up is recommended  Constipation with findings suggesting stercoral colitis, as described above  Please see discussion  No evidence of small bowel obstruction  There is a questionable hypodensity within the uncinate process of the pancreas, measuring approximately 11 mm  A mass in this area should be excluded  Follow-up is recommended  Nonemergent outpatient MRI is recommended for further characterization,    if there are no contraindications  Mild fatty infiltration of the liver  Other findings as described above, please see discussion  The study was marked in [facility] for immediate notification  Workstation performed: [id number]             EKG, Pathology, and Other Studies Reviewed on Admission:   · EKG: NSR    Allscripts / Epic Records Reviewed: Yes     ** Please Note: This note has been constructed using a voice recognition system   **

## 2020-08-07 NOTE — PHYSICAL THERAPY NOTE
PT EVALUATION  11:45-12:06    69 y o     [id number]    Multiple sclerosis (HCC) [G35]  Diabetes mellitus ([address] ) [E11 9]  Nausea [R11 0]  UTI (urinary tract infection) [N39 0]  Weakness [R53 1]  Tachycardia [R00 0]  Generalized abdominal pain [R10 84]  Pancreatic mass [K86 89]  Chronic suprapubic catheter (HCC) [Z93 59]  Generalized weakness [R53 1]  Constipation, unspecified constipation type [K59 00]    Past Medical History:   Diagnosis Date    Acute laryngitis     Acute nonsuppurative otitis media, unspecified laterality     Arm weakness     Arthritis     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HCC)     Bladder infection     Bronchitis     Constipation     Cough     Diabetes mellitus (HCC)     Dizziness     Dysfunction of eustachian tube     Erectile dysfunction of non-organic origin     Fatigue     Glaucoma     Hiatal hernia     Hypertension     Imbalance     Leg muscle spasm     MS (multiple sclerosis) ([address] )     Nephrolithiasis     No natural teeth     Sinus pain     Spinal stenosis     Strain of thoracic region     Stroke ([address] )     Suprapubic catheter ([address] )          Past Surgical History:   Procedure Laterality Date    APPENDECTOMY      BRAIN SURGERY      Coil placed in aneurysm    CYSTOSCOPY      CYSTOSCOPY      CYSTOSCOPY  06/11/2018    EYE SURGERY      transscleral cyclophotocoagulation noncontact YAG laser    MYRINGOTOMY      with ventilation tube insertion    MI REMOVE BLADDER STONE,<2 5 CM N/A 5/7/2019    Procedure: CYSTOSCOPY, holmium laser litholapaxy of bladder stones, EXCHANGE OF SP TUBE;  Surgeon: Evan Jean MD;  [facility];  Service: Urology    SUPRAPUBIC CATHETER INSERTION          08/07/20 1145   Note Type   Note type Eval only   Pain Assessment   Pain Score No Pain   Home Living   Type of [address] [place] [address] One level;Ramped entrance  (ramp + 1 DAI with BUE handles/rail to pull in )   Bathroom Shower/Tub Walk-in shower   Bathroom Toilet Standard Bathroom Equipment Shower chair;Grab bars in shower;Grab bars around [employer]  (not accessible via [place] )   [address] [facility] [address] bed; Wheelchair-manual;Crutches;Walker  (reports having something similar to quickmove device)   Additional Comments resides with sister and brother in single story living with ramp + 1 DAI  A with WC and bars to enter one DAI  Occasional home alone  Sister with throat CA per pt report  Prior Function   Level of [place] Needs assistance with ADLs and functional mobility;Modified independent with wheelchair   Lives With Other (Comment)  (brother)   Receives Help From Family   ADL Assistance Needs assistance   IADLs Needs assistance   Falls in the last 6 months 0   Comments Reports primarily uses WC for locmotion  Sit pivot transfers  Self propels WC  Occasional assistance needed for transfers and ADLs  States independence with WC mobility  Attends [employer] 3-4 times per week, but not been since COVID  States has not been able to ambulate with RW v loftstrand crutches since COVID virus and inabiltiy to go with COVID  Restrictions/Precautions   Weight Bearing Precautions Per Order No   Other Precautions Fall Risk;Multiple lines; Chair Alarm; Bed Alarm   General   Additional Pertinent History Pt is 68 y/o male admitted with weakness, abd pain   + UTI  PT consulted  Up with assist orders  Family/Caregiver Present No   Cognition   Arousal/Participation Alert   Orientation Level Oriented to person;Oriented to place;Oriented to time   Following Commands Follows one step commands without difficulty   Comments P[place]ant     RUE Assessment   RUE Assessment WFL  (as observed with functional reach and grasp)   LUE Assessment   LUE Assessment WFL  (as observed with functional reach and grasp )   RLE Assessment   RLE Assessment X  (increased tone/spasticity)   Strength RLE   RLE Overall Strength 3-/5  (grossly <3-/5, difficultly isolating for MMT 2* spasticity)   Tone RLE   RLE Tone Hypertonic  (spasticity)   LLE Assessment   LLE Assessment X   Strength LLE   LLE Overall Strength 3-/5  (groslsy <3-/5  Difficulty isolating for MMT 2* spasticity)   Tone LLE   LLE Tone Hypertonic  (spasticity)   Coordination   Movements are Fluid and Coordinated 0   Coordination and Movement Description LE spasticity, increased tone, weakness  Light Touch   RLE Light Touch Absent   LLE Light Touch Absent   Proprioception   RLE Proprioception Absent   LLE Proprioception Absent   Bed Mobility   Supine to Sit 2  Maximal assistance   Additional items Assist x 2; Increased time required;Verbal cues;LE management; Bedrails;HOB elevated   Additional Comments Increased time to acheive sitting and improved balance  Increased tone/LE spasiticity with increased assistance of LE positioning needed to improve trunk stabiltiy and sitting balance at EOB  Transfers   Sit to Stand 2  Maximal assistance   Additional items Assist x 2; Increased time required;Verbal cues   Stand to Sit 2  Maximal assistance   Additional items Assist x 2; Increased time required;Verbal cues   Additional Comments cues for hand placement  Increased time to complete  B/L knee flexion, increased LE spasticity in standing  Heavy UE reliance on RW  Ambulation/Elevation   Gait pattern Improper Weight shift;Decreased foot clearance; Ataxia; Not appropriate   Gait Assistance 2  Maximal assist   Additional items Assist x 2   Assistive Device Rolling walker   Distance 0 ft  Attempt to take one step with RW support with LLE with inability to complete weight shift to advance RLE  Lowered to seated for safety     Balance   Static Sitting Poor +  (variable balance, improved over sitting time EOB )   Dynamic Sitting Poor -   Static Standing Poor -   Dynamic Standing Poor -   Ambulatory Zero   Endurance Deficit   Endurance Deficit Yes   Endurance Deficit Description fatigue   Activity Tolerance   Activity Tolerance Patient tolerated treatment well;Patient limited by fatigue   Medical Staff Made Aware Nurse, Timothy Nova OT-Trell   Nurse Made Aware notified need for quickmove for OOB mobility   Assessment   Prognosis Fair   Problem List Decreased strength;Decreased range of motion;Decreased endurance; Impaired balance;Decreased mobility; Decreased coordination; Impaired sensation; Impaired tone   Assessment Kanchan Steve is a 69 y o  male who presents with abdominal pain  Patient with hx of MS, CVA, DM and neurogenic bladder  Admitted with UTI and incidental finding of pancreatic mass on CT  PT consulted  Up with assist orders  Prior to admission resides with brother and sister in one story home with ramp + 1 DAI  Primarily WC level for locomotion but reports did last ambulate at [employer] with RW in March  Since has not been ambulating at home  Assist varies from independent with sit pivot transfers to assistance from family  Currently presents with significant functional limitations related to decreased activity tolerance, balance, strength, coordination, sensation, spasticity, and overall mobility  Requires max A of 2 for bed mobility and transfers with inability to safety progress with few steps using RW to progress OOB to chair  Given impairments as noted above with increased risk for falls and requiring Ax2, will benefit from ongoing PT in order to optimize outcomes and minimize caregiver assistance  Pt ademently declines rehab at d/c and preference to return home  Will require 24* assistance/care if to return home  Continued PT is recommended  See progress note following eval for progress of transfers to facilitate OOB  Goals   Patient Goals go home! STG Expiration Date 08/17/20   Short Term Goal #1 10 days: 1)  Pt will perform bed mobility with Thuy demonstrating appropriate technique 100% of the time in order to improve function  2)  Perform all transfers with Thuy demonstrating safe and appropriate technique 100% of the time in order to improve ability to negotiate safely in home environment  3) Independent with WC mobility for distances of 50 ft of level surfaces in order to improve negotiation in home environment  4)  Improve overall strength and balance 1/2 grade in order to optimize ability to perform functional tasks and reduce fall risk  5) Increase activity tolerance to 30 minutes in order to improve endurance to functional tasks  6) PT for ongoing patient and family/caregiver education, DME needs and d/c planning in order to promote highest level of function in least restrictive environment  PT Treatment Day 1   Plan   Treatment/Interventions Functional transfer training;LE strengthening/ROM; Therapeutic exercise; Endurance training;Equipment eval/education;Patient/family training;Bed mobility;Gait training; Compensatory technique education;Continued evaluation;Spoke to nursing;OT   PT Frequency Other (Comment)  (3-5x/wk)   Recommendation   PT Discharge Recommendation Post-Acute Rehabilitation Services  (however pt wants home with PT and family support )   Equipment Recommended   (monitor    ? benefit from asim lift at home )   Barthel Index   Feeding 10   Bathing 0   Grooming Score 5   Dressing Score 5   Bladder Score 0   Bowels Score 5   Toilet Use Score 5   Transfers (Bed/Chair) Score 5   Mobility (Level Surface) Score 0   Stairs Score 0   Barthel Index Score 35     History: co - morbidities, fall risk, use of assistive device, assist for adl's, multiple lines  Exam: impairments in locomotion, musculoskeletal, balance,posture,coordination, sensation, muscle tone, barthel 35  Clinical: unstable/unpredictable ( fall risk, Ax2, ongoing medical management of current conditions)  Complexity:high    Adra Locket, PT     Time In: 1206  Time Out:1221  Total Time: 15 minutes      S:  Agreeable to use quickmove for OOB   "I have something similar to this at home"  O:  Transfers sit<>stand on quickmove performed x 2 with modA of 2   Transferred OOB to chair with quickmove with Ax2  Remained seated OOB in chair with alarm in place  A:  Improved abiltiy to complete sit to stand with UE use to complete transfers on quickmove  Improved OOB to chair transfers for safety with same  May benefit from STR, however refusing with preference to return home with continued PT  Needs 24* care    P:  PT per POC    Lia Gregory, PT

## 2020-08-07 NOTE — PLAN OF CARE
Problem: Potential for Falls  Goal: Patient will remain free of falls  Description: INTERVENTIONS:  - Assess patient frequently for physical needs  -  Identify cognitive and physical deficits and behaviors that affect risk of falls  -  [place] fall precautions as indicated by assessment   - Educate patient/family on patient safety including physical limitations  - Instruct patient to call for assistance with activity based on assessment  - Modify environment to reduce risk of injury  - Consider OT/PT consult to assist with strengthening/mobility  Outcome: Progressing     Problem: Prexisting or High Potential for Compromised Skin Integrity  Goal: Skin integrity is maintained or improved  Description: INTERVENTIONS:  - Identify patients at risk for skin breakdown  - Assess and monitor skin integrity  - Assess and monitor nutrition and hydration status  - Monitor labs   - Assess for incontinence   - Turn and reposition patient  - Assist with mobility/ambulation  - Relieve pressure over bony prominences  - Avoid friction and shearing  - Provide appropriate hygiene as needed including keeping skin clean and dry  - Evaluate need for skin moisturizer/barrier cream  - Collaborate with interdisciplinary team   - Patient/family teaching  - Consider wound care consult   Outcome: Progressing     Problem: Nutrition/Hydration-ADULT  Goal: Nutrient/Hydration intake appropriate for improving, restoring or maintaining nutritional needs  Description: Monitor and assess patient's nutrition/hydration status for malnutrition  Collaborate with interdisciplinary team and initiate plan and interventions as ordered  Monitor patient's weight and dietary intake as ordered or per policy  Utilize nutrition screening tool and intervene as necessary  Determine patient's food preferences and provide high-protein, high-caloric foods as appropriate       INTERVENTIONS:  - Monitor oral intake, urinary output, labs, and treatment plans  - Assess nutrition and hydration status and recommend course of action  - Evaluate amount of meals eaten  - Assist patient with eating if necessary   - Allow adequate time for meals  - Recommend/ encourage appropriate diets, oral nutritional supplements, and vitamin/mineral supplements  - Order, calculate, and assess calorie counts as needed  - Recommend, monitor, and adjust tube feedings and TPN/PPN based on assessed needs  - Assess need for intravenous fluids  - Provide specific nutrition/hydration education as appropriate  - Include patient/family/caregiver in decisions related to nutrition  Outcome: Progressing     Problem: PAIN - ADULT  Goal: Verbalizes/displays adequate comfort level or baseline comfort level  Description: Interventions:  - Encourage patient to monitor pain and request assistance  - Assess pain using appropriate pain scale  - Administer analgesics based on type and severity of pain and evaluate response  - Implement non-pharmacological measures as appropriate and evaluate response  - Consider cultural and social influences on pain and pain management  - Notify physician/advanced practitioner if interventions unsuccessful or patient reports new pain  Outcome: Progressing     Problem: INFECTION - ADULT  Goal: Absence or prevention of progression during hospitalization  Description: INTERVENTIONS:  - Assess and monitor for signs and symptoms of infection  - Monitor lab/diagnostic results  - Monitor all insertion sites, i e  indwelling lines, tubes, and drains  - Monitor endotracheal if appropriate and nasal secretions for changes in amount and color  - [place] appropriate cooling/warming therapies per order  - Administer medications as ordered  - Instruct and encourage patient and family to use good hand hygiene technique  - Identify and instruct in appropriate isolation precautions for identified infection/condition  Outcome: Progressing     Problem: DISCHARGE PLANNING  Goal: Discharge to home or other facility with appropriate resources  Description: INTERVENTIONS:  - Identify barriers to discharge w/patient and caregiver  - Arrange for needed discharge resources and transportation as appropriate  - Identify discharge learning needs (meds, wound care, etc )  - Arrange for interpretive services to assist at discharge as needed  - Refer to Case Management Department for coordinating discharge planning if the patient needs post-hospital services based on physician/advanced practitioner order or complex needs related to functional status, cognitive ability, or social support system  Outcome: Progressing     Problem: Knowledge Deficit  Goal: Patient/family/caregiver demonstrates understanding of disease process, treatment plan, medications, and discharge instructions  Description: Complete learning assessment and assess knowledge base    Interventions:  - Provide teaching at level of understanding  - Provide teaching via preferred learning methods  Outcome: Progressing     Problem: GASTROINTESTINAL - ADULT  Goal: Minimal or absence of nausea and/or vomiting  Description: INTERVENTIONS:  - Administer IV fluids if ordered to ensure adequate hydration  - Maintain NPO status until nausea and vomiting are resolved  - Nasogastric tube if ordered  - Administer ordered antiemetic medications as needed  - Provide nonpharmacologic comfort measures as appropriate  - Advance diet as tolerated, if ordered  - Consider nutrition services referral to assist patient with adequate nutrition and appropriate food choices  Outcome: Progressing     Problem: GENITOURINARY - ADULT  Goal: Maintains or returns to baseline urinary function  Description: INTERVENTIONS:  - Assess urinary function  - Encourage oral fluids to ensure adequate hydration if ordered  - Administer IV fluids as ordered to ensure adequate hydration  - Administer ordered medications as needed  - Offer frequent toileting  - Follow urinary retention protocol if ordered  Outcome: Progressing     Problem: METABOLIC, FLUID AND ELECTROLYTES - ADULT  Goal: Electrolytes maintained within normal limits  Description: INTERVENTIONS:  - Monitor labs and assess patient for signs and symptoms of electrolyte imbalances  - Administer electrolyte replacement as ordered  - Monitor response to electrolyte replacements, including repeat lab results as appropriate  - Instruct patient on fluid and nutrition as appropriate  Outcome: Progressing  Goal: Fluid balance maintained  Description: INTERVENTIONS:  - Monitor labs   - Monitor I/O and WT  - Instruct patient on fluid and nutrition as appropriate  - Assess for signs & symptoms of volume excess or deficit  Outcome: Progressing     Problem: SKIN/TISSUE INTEGRITY - ADULT  Goal: Skin integrity remains intact  Description: INTERVENTIONS  - Identify patients at risk for skin breakdown  - Assess and monitor skin integrity  - Assess and monitor nutrition and hydration status  - Monitor labs (i e  albumin)  - Assess for incontinence   - Turn and reposition patient  - Assist with mobility/ambulation  - Relieve pressure over bony prominences  - Avoid friction and shearing  - Provide appropriate hygiene as needed including keeping skin clean and dry  - Evaluate need for skin moisturizer/barrier cream  - Collaborate with interdisciplinary team (i e  Nutrition, Rehabilitation, etc )   - Patient/family teaching  Outcome: Progressing

## 2020-08-07 NOTE — PLAN OF CARE
Problem: PHYSICAL THERAPY ADULT  Goal: Performs mobility at highest level of function for planned discharge setting  See evaluation for individualized goals  Description: Treatment/Interventions: Functional transfer training, LE strengthening/ROM, Therapeutic exercise, Endurance training, Equipment eval/education, Patient/family training, Bed mobility, Gait training, Compensatory technique education, Continued evaluation, Spoke to nursing, OT  Equipment Recommended: (monitor  ? benefit from asim lift at home )       See flowsheet documentation for full assessment, interventions and recommendations  Outcome: Progressing  Note: Prognosis: Fair  Problem List: Decreased strength, Decreased range of motion, Decreased endurance, Impaired balance, Decreased mobility, Decreased coordination, Impaired sensation, Impaired tone  Assessment: Angelina Antunez is a 69 y o  male who presents with abdominal pain  Patient with hx of MS, CVA, DM and neurogenic bladder  Admitted with UTI and incidental finding of pancreatic mass on CT  PT consulted  Up with assist orders  Prior to admission resides with brother and sister in one story home with ramp + 1 DAI  Primarily WC level for locomotion but reports did last ambulate at [employer] [facility] [employer] with RW in March  Since has not been ambulating at home  Assist varies from independent with sit pivot transfers to assistance from family  Currently presents with significant functional limitations related to decreased activity tolerance, balance, strength, coordination, sensation, spasticity, and overall mobility  Requires max A of 2 for bed mobility and transfers with inability to safety progress with few steps using RW to progress OOB to chair  Given impairments as noted above with increased risk for falls and requiring Ax2, will benefit from ongoing PT in order to optimize outcomes and minimize caregiver assistance  Pt ademently declines rehab at d/c and preference to return home  Will require 24* assistance/care if to return home  Continued PT is recommended  See progress note following eval for progress of transfers to facilitate OOB  PT Discharge Recommendation: Post-Acute Rehabilitation Services(however pt wants home with PT and family support )          See flowsheet documentation for full assessment

## 2020-08-07 NOTE — PLAN OF CARE
Problem: OCCUPATIONAL THERAPY ADULT  Goal: Performs self-care activities at highest level of function for planned discharge setting  See evaluation for individualized goals  Description: Treatment Interventions: ADL retraining, Functional transfer training, Endurance training, Cognitive reorientation, Patient/family training, Compensatory technique education          See flowsheet documentation for full assessment, interventions and recommendations  Note: Limitation: Decreased ADL status, Decreased Safe judgement during ADL, Decreased endurance, Decreased high-level ADLs  Prognosis: Good  Assessment: Pt is a 67y/o male admitted to the hospital 2* symptoms of abdominal pain  Pt noted with UTI and pancreatic mass  Pt with PMH neurogenic bladder, MS, CVA, spinal stenosis, and eye sx  PTA pt states independence with his ADLs, ocassional need for assistance with transfers; neg falls, ocassionally home alone; neg ambulation since March at [facility] [employer]  During initial eval, pt demonstrated deficits with his functional balance, functional mobility, ADL status, activity tolerance(currently fair=15-20mins), transfer safety, and questionable cognition(i e judgement/safety)  Pt would benefit from continued OT tx for the above deficits  3-5xwk/1-2wks        OT Discharge Recommendation: Post-Acute Rehabilitation Services(pt prefers home with home PT/OT)

## 2020-08-07 NOTE — ASSESSMENT & PLAN NOTE
Lab Results   Component Value Date    HGBA1C 9 7 09/12/2019       No results for input(s): POCGLU in the last 72 hours      Order accuchecks with sliding scale

## 2020-08-07 NOTE — ASSESSMENT & PLAN NOTE
Complicated UTI in the setting of neurogenic bladder with history of suprapubic tube, maintained to gravity drainage  Changed bedside without event for 24 [place] suprapubic catheter, 8 cc placed in the balloon, urine aspirated  Patient tolerated this well  Maintained to gravity drainage  Current coverage with Cipro, IV  Culture pending  Tailor antibiotics to culture results  Outpatient urologic follow-up with plans for home nursing to change his Cage on approximately 9/7/2020  Keep scheduled follow-up with Dr Sunny Gomez for repeat cystoscopy in December

## 2020-08-07 NOTE — OCCUPATIONAL THERAPY NOTE
Occupational Therapy Evaluation(tmvn=[id number])     Patient Name: Erickson Salgado  JSSVK'R Date: 8/7/2020  Problem List  Principal Problem:    Urinary tract infection  Active Problems:    Multiple sclerosis ([address] )    Diabetes mellitus ([facility] [address] )    History of CVA (cerebrovascular accident)    Pancreatic mass    Past Medical History  Past Medical History:   Diagnosis Date    Acute laryngitis     Acute nonsuppurative otitis media, unspecified laterality     Arm weakness     Arthritis     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HCC)     Bladder infection     Bronchitis     Constipation     Cough     Diabetes mellitus (HCC)     Dizziness     Dysfunction of eustachian tube     Erectile dysfunction of non-organic origin     Fatigue     Glaucoma     Hiatal hernia     Hypertension     Imbalance     Leg muscle spasm     MS (multiple sclerosis) (HCC)     Nephrolithiasis     No natural teeth     Sinus pain     Spinal stenosis     Strain of thoracic region     Stroke ([facility] [address] )     Suprapubic catheter ([facility] [address] )      Past Surgical History  Past Surgical History:   Procedure Laterality Date    APPENDECTOMY      BRAIN SURGERY      Coil placed in aneurysm    CYSTOSCOPY      CYSTOSCOPY      CYSTOSCOPY  06/11/2018    EYE SURGERY      transscleral cyclophotocoagulation noncontact YAG laser    MYRINGOTOMY      with ventilation tube insertion    LA REMOVE BLADDER STONE,<2 5 CM N/A 5/7/2019    Procedure: CYSTOSCOPY, holmium laser litholapaxy of bladder stones, EXCHANGE OF SP TUBE;  Surgeon: John Ponce MD;  [facility]: [facility];  Service: Urology    SUPRAPUBIC CATHETER INSERTION               08/07/20 1220   Note Type   Note type Eval only   Restrictions/Precautions   Weight Bearing Precautions Per Order No   Other Precautions Fall Risk;Multiple lines; Chair Alarm; Bed Alarm   Pain Assessment   Pain Assessment Tool Pain Assessment not indicated - pt denies pain   Pain Score 1   Home Living   Type of [address] [place] [address] One level;Ramped entrance   Bathroom Shower/Tub Walk-in shower   Bathroom Toilet Standard   Bathroom Equipment Shower chair;Grab bars in [address] bed; Wheelchair-manual;Walker;Cane   Prior Function   Lives With Family  (brother, sister)   ADL Assistance Independent   Falls in the last 6 months 0   Lifestyle   Autonomy PTA pt states independence with his ADLs, ocassional need for assistance with transfers; neg falls, ocassionally home alone; neg ambulation since March at [facility] Relationships supportive brother, sister   Service to Others worked in [address] [phone] 5 examples "I've had MS since I was 14y/o "   ADL   Where Assessed Edge of bed   Eating Assistance 6  Modified independent   Grooming Assistance 6  Modified Independent   UB Bathing Assistance 5  Supervision/Setup   LB Bathing Assistance 3  Moderate Assistance   UB Dressing Assistance 5  Supervision/Setup   LB Dressing Assistance 3  Moderate Assistance   Bed Mobility   Supine to Sit 2  Maximal assistance   Additional items Assist x 2; Increased time required;Verbal cues;LE management   Transfers   Sit to Stand 2  Maximal assistance   Additional items Assist x 2; Increased time required;Verbal cues   Stand to Sit 2  Maximal assistance   Additional items Assist x 2; Increased time required;Verbal cues   Functional Mobility   Functional Mobility 3  Moderate assistance   Additional Comments x2   Additional items   (with "quick-move")   Balance   Static Sitting Poor +   Dynamic Sitting Poor -   Static Standing Poor -   Dynamic Standing Poor -   Activity Tolerance   Activity Tolerance Patient limited by fatigue   Medical Staff Made Aware MICHAEL coronel     RUE Assessment   RUE Assessment WFL   RUE Strength   RUE Overall Strength Within Functional Limits - strength 5/5   LUE Assessment   LUE Assessment WFL   LUE Strength   LUE Overall Strength Within Functional Limits - strength 5/5   Hand Function   Gross Motor Coordination Functional   Fine Motor Coordination Functional   Sensation   Light Touch No apparent deficits   Proprioception   Proprioception No apparent deficits   Vision-Basic Assessment   Current Vision   (glasses)   Vision - Complex Assessment   Acuity Able to read clock/calendar on wall without difficulty   Perception   Inattention/Neglect Appears intact   Cognition   Overall Cognitive Status WFL   Arousal/Participation Alert   Attention Within functional limits   Orientation Level Oriented X4   Memory Within functional limits   Following Commands Follows one step commands without difficulty   Assessment   Limitation Decreased ADL status; Decreased Safe judgement during ADL;Decreased endurance;Decreased high-level ADLs   Prognosis Good   Assessment Pt is a 67y/o male admitted to the hospital 2* symptoms of abdominal pain  Pt noted with UTI and pancreatic mass  Pt with PMH neurogenic bladder, MS, CVA, spinal stenosis, and eye sx  PTA pt states independence with his ADLs, ocassional need for assistance with transfers; neg falls, ocassionally home alone; neg ambulation since March at [employer] [facility] [employer]  During initial eval, pt demonstrated deficits with his functional balance, functional mobility, ADL status, activity tolerance(currently fair=15-20mins), transfer safety, and questionable cognition(i e judgement/safety)  Pt would benefit from continued OT tx for the above deficits  3-5xwk/1-2wks  Goals   Patient Goals "to go home"   STG Time Frame   (1-7 days)   Short Term Goal #1 Pt will demonstrate improved activity tolerance to good(20-30mins) and standing tolerance to 1-3mins to assist with ADLs  Short Term Goal #2 Pt will demonstrate proper transfer(stand-pivot) safety 100% of the time  Short Term Goal  Pt will demonstrate Thuy with their bed mobility to facilitate EOB ADLs      LTG Time Frame   (7-14days)   Long Term Goal #1 Pt will demonstrate Thuy with their sit-stand transfers to assist with completion of their LE dressing  Long Term Goal #2 Pt will demonstrate improved functional balance by 1 grade to assist with ADLs/transfers  Long Term Goal Pt will demonstrate mod I with their UE and LE bathing/dresssing  Plan   Treatment Interventions ADL retraining;Functional transfer training; Endurance training;Cognitive reorientation;Patient/family training; Compensatory technique education   Goal Expiration Date 08/21/20   OT Treatment Day 0   OT Frequency 3-5x/wk   Recommendation   OT Discharge Recommendation Post-Acute Rehabilitation Services  (pt prefers home with home PT/OT)   Barthel Index   Feeding 10   Bathing 0   Grooming Score 5   Dressing Score 5   Bladder Score 0   Bowels Score 5   Toilet Use Score 5   Transfers (Bed/Chair) Score 5   Mobility (Level Surface) Score 0   Stairs Score 0   Barthel Index Score 35   Debbie Terrell, OT

## 2020-08-07 NOTE — UTILIZATION REVIEW
Notification of Inpatient Admission/Inpatient Authorization Request   This is a Notification of Inpatient Admission for [address]  Be advised that this patient was admitted to our facility under Inpatient Status  Contact Deja Hay at [phone] for additional admission information  [facility]  DEDICATED -[phone]  Patient Name:   Gilbert Hernandez   YOB: 1950       [address]   [address]:   [address]   Tax ID: [id number]  NPI: [id number] Attending Provider/NPI:  Phone:  Address: Cash Flowers [[id number]]  [phone]  Same as the facility   Place of Service Code: 24 Place of Service Name:  [address] [place] [address]   Start Date: 8/7/20 0250 Discharge Date & Time: No discharge date for patient encounter  Type of Admission: Inpatient Status Discharge Disposition   (if discharged): Home/Self Care   Patient Diagnoses: Multiple sclerosis ([facility] ) [G35]  Diabetes mellitus ([facility] ) [E11 9]  Nausea [R11 0]  UTI (urinary tract infection) [N39 0]  Weakness [R53 1]  Tachycardia [R00 0]  Generalized abdominal pain [R10 84]  Pancreatic mass [K86 89]  Chronic suprapubic catheter ([facility] [address] ) [Z93 59]  Generalized weakness [R53 1]  Constipation, unspecified constipation type [K59 00]     Orders: Admission Orders (From admission, onward)     Ordered        08/07/20 0251  Inpatient Admission (expected length of stay for this patient Order details is greater than two midnights)  Once                    Assigned Utilization Review Contact: [address] [place] [address] Utilization Review Department  Phone: [phone]; Fax [phone]  Email: [email or web address]   ATTENTION PAYERS: Please call the assigned Utilization  directly with any questions or concerns ALL voicemails in the department are confidential  Send all requests for admission clinical reviews, approved or denied determinations and any other requests to dedicated fax number belonging to the campus where the patient is receiving treatment

## 2020-08-07 NOTE — ED PROVIDER NOTES
History  Chief Complaint   Patient presents with    Weakness - Generalized     bedbound from previous stroke, reports generalized weakness, abdominal pain and chills starting today, tylenol PTA     This is a 69year old male who has an extensive PMH who comes to the ED c/o generalized weakness, nausea, abdominal pain, chills and temp of 102  He states he had tylenol PTA  He denies vomiting or diarrhea but feels like he could have both  He states his visiting nurse told him to come to the ED  He has MS with a neurogenic bladder  He has a suprapubic dela cruz that is clamped off  Pt states last BM Monday  Past Medical History:  Diagnosis Date   Acute laryngitis    Acute nonsuppurative otitis media, unspecified laterality    Arm weakness    Arthritis    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HCC)    Bladder infection    Bronchitis    Constipation    Cough    Diabetes mellitus (HCC)    Dizziness    Dysfunction of eustachian tube    Erectile dysfunction of non-organic origin    Fatigue    Glaucoma    Hiatal hernia    Hypertension    Imbalance    Leg muscle spasm    MS (multiple sclerosis) (HCC)    Nephrolithiasis    No natural teeth    Sinus pain    Spinal stenosis    Strain of thoracic region    Stroke [facility])    Suprapubic catheter ([facility] )           History provided by:  Medical records and patient  History limited by:  Age   used: No        Prior to Admission Medications   Prescriptions Last Dose Informant Patient Reported? Taking? ALPRAZolam (XANAX) 0 25 mg tablet  Self Yes Yes   Sig: Take 0 25 mg by mouth daily at bedtime as needed for anxiety or sleep     albuterol (PROVENTIL HFA,VENTOLIN HFA) 90 mcg/act inhaler  Self Yes Yes   Sig: Inhale 2 puffs every 6 (six) hours as needed for wheezing   amLODIPine (NORVASC) 10 mg tablet  Self Yes Yes   Sig: Take 10 mg by mouth daily  atorvastatin (LIPITOR) 20 mg tablet  Self Yes Yes   Sig: Take 20 mg by mouth daily at bedtime  baclofen 10 mg tablet  Self Yes Yes   Sig: Take 2 5 mg by mouth 2 (two) times a day Takes 1 tablet in am and 1 tablet @ 2pm   brimonidine-timolol (COMBIGAN) 0 2-0 5 % Not Taking at Unknown time Self Yes No   Sig: Administer 1 drop to both eyes every 12 (twelve) hours   clopidogrel (PLAVIX) 75 mg tablet  Self No Yes   Sig: Take 1 tablet (75 mg total) by mouth daily   dextromethorphan-guaifenesin (MUCINEX DM)  MG per 12 hr tablet   No Yes   Sig: Take 1 tablet by mouth every 12 (twelve) hours as needed for cough   furosemide (LASIX) 20 mg tablet  Self Yes Yes   Sig: Take 40 mg by mouth daily     gabapentin (NEURONTIN) 300 mg capsule  Self No Yes   Sig: Take 1 capsule (300 mg total) by mouth 2 (two) times a day AND 2 capsules (600 mg total) daily at bedtime  linaGLIPtin 5 MG TABS Unknown at Unknown time Self Yes No   Sig: Take 5 mg by mouth daily   losartan (COZAAR) 50 mg tablet Unknown at Unknown time Self Yes No   Sig: Take 50 mg by mouth daily  metFORMIN (GLUCOPHAGE) 500 mg tablet  Self Yes Yes   Sig: Take 1,000 mg by mouth 2 (two) times a day with meals     pantoprazole (PROTONIX) 40 mg tablet  Self No Yes   Sig: TAKE 1 TABLET TWICE DAILY 30 MINUTES BEFORE BREAKFAST AND DINNER     potassium chloride (K-DUR,KLOR-CON) 10 mEq tablet Not Taking at Unknown time Self No No   Sig: Take 1 tablet (10 mEq total) by mouth daily   Patient not taking: Reported on 8/7/2020   propranolol (INDERAL LA) 60 mg 24 hr capsule  Self No No   Sig: TAKE 1 CAPSULE DAILY   sitaGLIPtin (JANUVIA) 100 mg tablet  Self Yes Yes   Sig: Take 100 mg by mouth daily   sucralfate (CARAFATE) 1 g tablet Not Taking at Unknown time Self No No   Sig: Take 1 tablet (1 g total) by mouth 2 (two) times a day   Patient not taking: Reported on 8/7/2020      Facility-Administered Medications: None       Past Medical History:   Diagnosis Date    Acute laryngitis     Acute nonsuppurative otitis media, unspecified laterality     Arm weakness     Arthritis     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HCC)     Bladder infection     Bronchitis     Constipation     Cough     Diabetes mellitus (HCC)     Dizziness     Dysfunction of eustachian tube     Erectile dysfunction of non-organic origin     Fatigue     Glaucoma     Hiatal hernia     Hypertension     Imbalance     Leg muscle spasm     MS (multiple sclerosis) (HCC)     Nephrolithiasis     No natural teeth     Sinus pain     Spinal stenosis     Strain of thoracic region     [facility]roke [facility] [address] )     Suprapubic catheter ([facility] )        Past Surgical History:   Procedure Laterality Date    APPENDECTOMY      BRAIN SURGERY      Coil placed in aneurysm    CYSTOSCOPY      CYSTOSCOPY      CYSTOSCOPY  06/11/2018    EYE SURGERY      transscleral cyclophotocoagulation noncontact YAG laser    MYRINGOTOMY      with ventilation tube insertion    MS REMOVE BLADDER STONE,<2 5 CM N/A 5/7/2019    Procedure: CYSTOSCOPY, holmium laser litholapaxy of bladder stones, EXCHANGE OF SP TUBE;  Surgeon: Lauren Becker MD;  [facility]: [facility];  Service: Urology    SUPRAPUBIC CATHETER INSERTION         Family History   Problem Relation Age of Onset    Heart attack Mother     Stroke Mother     Heart attack Father     Anuerysm Father         In Stomach      I have reviewed and agree with the history as documented  E-Cigarette/Vaping     E-Cigarette/Vaping Substances     Social History     Tobacco Use    Smoking status: Former Smoker     Types: Cigarettes    Smokeless tobacco: Never Used    Tobacco comment: smoked for 15-20 years, stopped about 25 years ago   Substance Use Topics    Alcohol use: No    Drug use: No       Review of Systems   Constitutional: Positive for chills, fatigue and fever  Gastrointestinal: Positive for abdominal pain and nausea  Negative for diarrhea and vomiting  Genitourinary:        Suprapubic cath    All other systems reviewed and are negative        Physical Exam  Physical Exam  Vitals signs and nursing note reviewed  Constitutional:       General: He is not in acute distress  Appearance: Normal appearance  He is not ill-appearing, toxic-appearing or diaphoretic  HENT:      Head: Normocephalic  Mouth/Throat:      Mouth: Mucous membranes are moist    Eyes:      Extraocular Movements: Extraocular movements intact  Pupils: Pupils are equal, round, and reactive to light  Neck:      Musculoskeletal: Normal range of motion  Cardiovascular:      Rate and Rhythm: Regular rhythm  Tachycardia present  Comments: B/L leg edema    Faint pedal pulses     Pulmonary:      Effort: Pulmonary effort is normal       Breath sounds: Normal breath sounds  Abdominal:      Palpations: Abdomen is soft  Tenderness: There is no abdominal tenderness  Comments: Faint BS    Musculoskeletal: Normal range of motion  Skin:     General: Skin is warm and dry  Capillary Refill: Capillary refill takes less than 2 seconds  Neurological:      General: No focal deficit present  Mental Status: He is alert and oriented to person, place, and time  Comments: MS    Psychiatric:         Mood and Affect: Mood normal          Behavior: Behavior normal          Thought Content:  Thought content normal          Vital Signs  ED Triage Vitals   Temperature Pulse Respirations Blood Pressure SpO2   08/06/20 2335 08/06/20 2335 08/06/20 2335 08/06/20 2340 08/06/20 2335   98 °F (36 7 °C) (!) 113 17 159/80 95 %      Temp Source Heart Rate Source Patient Position - Orthostatic VS BP Location FiO2 (%)   08/07/20 0330 08/06/20 2335 08/07/20 0107 08/07/20 0107 --   Temporal Monitor Sitting Left arm       Pain Score       08/07/20 0331       7           Vitals:    08/06/20 2340 08/07/20 0107 08/07/20 0238 08/07/20 0330   BP: 159/80 157/74 151/73 153/69   Pulse:  (!) 116 97 91   Patient Position - Orthostatic VS:  Sitting Lying Lying         Visual Acuity      ED Medications  Medications   sodium chloride 0 9 % infusion (50 mL/hr Intravenous New Bag 8/7/20 0029)   ciprofloxacin (CIPRO) IVPB (premix) 400 mg 200 mL (has no administration in time range)   ondansetron (ZOFRAN) injection 4 mg (has no administration in time range)   senna (SENOKOT) tablet 8 6 mg (has no administration in time range)   polyethylene glycol (MIRALAX) packet 17 g (has no administration in time range)   calcium carbonate (TUMS) chewable tablet 1,000 mg (has no administration in time range)   enoxaparin (LOVENOX) subcutaneous injection 40 mg (has no administration in time range)   insulin lispro (HumaLOG) 100 units/mL subcutaneous injection 1-6 Units (has no administration in time range)   insulin lispro (HumaLOG) 100 units/mL subcutaneous injection 1-5 Units (has no administration in time range)   acetaminophen (TYLENOL) tablet 650 mg (has no administration in time range)   albuterol (PROVENTIL HFA,VENTOLIN HFA) inhaler 2 puff (has no administration in time range)   ALPRAZolam (XANAX) tablet 0 25 mg (has no administration in time range)   amLODIPine (NORVASC) tablet 10 mg (has no administration in time range)   atorvastatin (LIPITOR) tablet 20 mg (has no administration in time range)   clopidogrel (PLAVIX) tablet 75 mg (has no administration in time range)   furosemide (LASIX) tablet 40 mg (has no administration in time range)   losartan (COZAAR) tablet 50 mg (has no administration in time range)   pantoprazole (PROTONIX) EC tablet 40 mg (has no administration in time range)   propranolol (INDERAL LA) 24 hr capsule 60 mg (has no administration in time range)   dextromethorphan-guaiFENesin (ROBITUSSIN DM)  mg/5 mL oral syrup 10 mL (has no administration in time range)   gabapentin (NEURONTIN) capsule 300 mg (has no administration in time range)   gabapentin (NEURONTIN) capsule 600 mg (has no administration in time range)   ondansetron (ZOFRAN) injection 4 mg (4 mg Intravenous Given 8/7/20 0030)   ciprofloxacin (CIPRO) IVPB (premix) 400 mg 200 mL (0 mg Intravenous Stopped 8/7/20 0258)   iohexol (OMNIPAQUE) 350 MG/ML injection (MULTI-DOSE) 100 mL (100 mL Intravenous Given 8/7/20 0155)       Diagnostic Studies  Results Reviewed     Procedure Component Value Units Date/Time    CK Total with Reflex CKMB [[id number]]  (Normal) Collected:  08/07/20 0000    Lab Status:  Final result Specimen:  Blood from Arm, Right Updated:  08/07/20 0133     Total CK 51 U/L     Urine Microscopic [[id number]]  (Abnormal) Collected:  08/07/20 0032    Lab Status:  Final result Specimen:  Urine, Suprapubic catheter Updated:  08/07/20 0124     RBC, UA       Field obscured, unable to enumerate     /hpf     WBC, UA Innumerable /hpf      Epithelial Cells       Field obscured, unable to enumerate     /hpf     Bacteria, UA       Field obscured, unable to enumerate     /hpf    Urine culture [[id number]] Collected:  08/07/20 0032    Lab Status:   In process Specimen:  Urine, Suprapubic catheter Updated:  08/07/20 0124    Comprehensive metabolic panel [[id number]]  (Abnormal) Collected:  08/07/20 0000    Lab Status:  Final result Specimen:  Blood from Arm, Right Updated:  08/07/20 0054     Sodium 133 mmol/L      Potassium 3 9 mmol/L      Chloride 97 mmol/L      CO2 26 mmol/L      ANION GAP 10 mmol/L      BUN 10 mg/dL      Creatinine 0 92 mg/dL      Glucose 211 mg/dL      Calcium 9 5 mg/dL      AST 12 U/L      ALT 21 U/L      Alkaline Phosphatase 106 U/L      Total Protein 8 1 g/dL      Albumin 3 4 g/dL      Total Bilirubin 0 54 mg/dL      eGFR 83 ml/min/1 73sq m     Narrative:       Meganilam guidelines for Chronic Kidney Disease (CKD):     Stage 1 with normal or high GFR (GFR > 90 mL/min/1 73 square meters)    Stage 2 Mild CKD (GFR = 60-89 mL/min/1 73 square meters)    Stage 3A Moderate CKD (GFR = 45-59 mL/min/1 73 square meters)    Stage 3B Moderate CKD (GFR = 30-44 mL/min/1 73 square meters)    Stage 4 Severe CKD (GFR = 15-29 mL/min/1 73 square meters)   Stage 5 End Stage CKD (GFR <15 mL/min/1 73 square meters)  Note: GFR calculation is accurate only with a steady state creatinine    Magnesium [[id number]]  (Normal) Collected:  08/07/20 0000    Lab Status:  Final result Specimen:  Blood from Arm, Right Updated:  08/07/20 0054     Magnesium 1 7 mg/dL     Lactic acid [[id number]]  (Normal) Collected:  08/07/20 0000    Lab Status:  Final result Specimen:  Blood from Arm, Left Updated:  08/07/20 0048     LACTIC ACID 1 3 mmol/L     Narrative:       Result may be elevated if tourniquet was used during collection      Troponin I [[id number]]  (Normal) Collected:  08/07/20 0000    Lab Status:  Final result Specimen:  Blood from Arm, Right Updated:  08/07/20 0048     Troponin I <0 02 ng/mL     UA w Reflex to Microscopic w Reflex to Culture [[id number]]  (Abnormal) Collected:  08/07/20 0032    Lab Status:  Final result Specimen:  Urine, Suprapubic catheter Updated:  08/07/20 0039     Color, UA Yellow     Clarity, UA Cloudy     Specific Waka, UA 1 020     pH, UA 7 5     Leukocytes, UA Moderate     Nitrite, UA Positive     Protein, UA 30 (1+) mg/dl      Glucose,  (1/2%) mg/dl      Ketones, UA 40 (2+) mg/dl      Urobilinogen, UA 0 2 E U /dl      Bilirubin, UA Negative     Blood, UA Small    Protime-INR [[id number]]  (Normal) Collected:  08/07/20 0000    Lab Status:  Final result Specimen:  Blood from Arm, Right Updated:  08/07/20 0027     Protime 12 1 seconds      INR 0 92    APTT [[id number]]  (Normal) Collected:  08/07/20 0000    Lab Status:  Final result Specimen:  Blood from Arm, Right Updated:  08/07/20 0027     PTT 27 seconds     CBC and differential [[id number]]  (Abnormal) Collected:  08/07/20 0000    Lab Status:  Final result Specimen:  Blood from Arm, Right Updated:  08/07/20 0014     WBC 13 50 Thousand/uL      RBC 5 74 Million/uL      Hemoglobin 16 7 g/dL      Hematocrit 50 0 %      MCV 87 fL      MCH 29 1 pg      MCHC 33 4 g/dL      RDW 13 0 %      MPV 8 3 fL Platelets 929 Thousands/uL      nRBC 0 /100 WBCs      Neutrophils Relative 74 %      Immat GRANS % 0 %      Lymphocytes Relative 15 %      Monocytes Relative 8 %      Eosinophils Relative 2 %      Basophils Relative 1 %      Neutrophils Absolute 10 03 Thousands/µL      Immature Grans Absolute 0 05 Thousand/uL      Lymphocytes Absolute 2 05 Thousands/µL      Monocytes Absolute 1 04 Thousand/µL      Eosinophils Absolute 0 21 Thousand/µL      Basophils Absolute 0 12 Thousands/µL     Blood culture #2 [[id number]] Collected:  08/07/20 0010    Lab Status: In process Specimen:  Blood from Hand, Left Updated:  08/07/20 0013    Blood culture #1 [[id number]] Collected:  08/06/20 8257    Lab Status: In process Specimen:  Blood from Arm, Left Updated:  08/07/20 0011                 CT abdomen pelvis with contrast   Final Result by Susan Rockwell MD (08/07 0240)      The urinary bladder is decompressed by a suprapubic catheter  The wall of the urinary bladder appears thickened and somewhat edematous  Additionally, there is some urothelial enhancement involving the right ureter  These findings suggest urinary tract    infection/cystitis and possibly pyelonephritis  Clinical correlation, laboratory correlation and follow-up is recommended  Constipation with findings suggesting stercoral colitis, as described above  Please see discussion  No evidence of small bowel obstruction  There is a questionable hypodensity within the uncinate process of the pancreas, measuring approximately 11 mm  A mass in this area should be excluded  Follow-up is recommended  Nonemergent outpatient MRI is recommended for further characterization,    if there are no contraindications  Mild fatty infiltration of the liver  Other findings as described above, please see discussion  The study was marked in [facility] for immediate notification              Workstation performed: [id number]                    Procedures  ECG 12 Lead Documentation Only    Date/Time: 8/6/2020 11:54 PM  Performed by: SHA Holly  Authorized by: SHA Holly     Indications / Diagnosis:  Abd pain nausea  generalized weakness   ECG reviewed by me, the ED Provider: yes (Dr Gregor Ellsworth )    Patient location:  ED  Previous ECG:     Previous ECG:  Compared to current    Similarity:  No change    Comparison to cardiac monitor: Yes    Interpretation:     Interpretation: normal    Rate:     ECG rate:  117    ECG rate assessment: tachycardic    Rhythm:     Rhythm: sinus tachycardia    Ectopy:     Ectopy: none    QRS:     QRS axis:  Normal    QRS intervals:  Normal  Conduction:     Conduction: normal    ST segments:     ST segments:  Normal  T waves:     T waves: normal               ED Course  ED Course as of Aug 07 0341   Fri Aug 07, 2020   0021 WBC(!): 13 50   0032 WBC 13 70 - pt has chronic leukocytosis       0056 Leukocytes, UA(!): Moderate   0056 Nitrite, UA(!): Positive   0110 LACTIC ACID: 1 3   0111 Last ua cx 2018 + with Providencia rettgeriAMorganella morganiiAbnormal     Multi-Drug Resistant Organism  Pseudomonas aeruginosa  + sensitivity to Cipro       0123 Urine with moderate leuks, + nitrates, glucose 500          0125 RBC, UA(!): Field obscured, unable to enumerate   0125 WBC, UA(!): Innumerable   0125 Epithelial Cells(!): Field obscured, unable to enumerate   0125 Pt receiving gentle hydration  Bacteria, UA(!): Field obscured, unable to enumerate   0248 CT A/P reviewed  Pancreatic mass, cystitis/UTI, constipation  All labs and radiology results reviewed and discussed with pt  Pt admitted to [address] [place] [address] Pt agrees with POC       /69 (BP Location: Left arm)   Pulse 91   Temp 97 7 °F (36 5 °C) (Temporal)   Resp 16   SpO2 94%       US AUDIT      Most Recent Value   Initial Alcohol Screen: US AUDIT-C    1  How often do you have a drink containing alcohol?  0 Filed at: 08/06/2020 7739   2   How many drinks containing alcohol do you have on a typical day you are drinking? 0 Filed at: 08/06/2020 2345   3b  FEMALE Any Age, or MALE 65+: How often do you have 4 or more drinks on one occassion? 0 Filed at: 08/06/2020 2345   Audit-C Score  0 Filed at: 08/06/2020 2345                  ALPESH/DAST-10      Most Recent Value   How many times in the past year have you    Used an illegal drug or used a prescription medication for non-medical reasons?   Never Filed at: 08/06/2020 2345                                MDM  Number of Diagnoses or Management Options  Diagnosis management comments: Generalized weakness, fever, abdominal pain, nausea    DDX:  Gastroenteritis  Constipation  Bowel obstruction  UTI  Pneumonia   STEMI, NSTEMI    Plan  EKG  Tele  Labs   Urine  IV  CT a/p, chest                    Amount and/or Complexity of Data Reviewed  Clinical lab tests: ordered and reviewed  Tests in the radiology section of CPT®: ordered and reviewed  Review and summarize past medical records: yes          Disposition  Final diagnoses:   Generalized weakness   UTI (urinary tract infection)   Chronic suprapubic catheter (HCC)   Generalized abdominal pain   Nausea   Diabetes mellitus ([facility] )   Multiple sclerosis ([facility] )   Tachycardia   Pancreatic mass   Constipation, unspecified constipation type     Time reflects when diagnosis was documented in both MDM as applicable and the Disposition within this note     Time User Action Codes Description Comment    8/7/2020  1:24 AM Isela Georgis Add [R53 1] Generalized weakness     8/7/2020  1:24 AM Isela Georgis Add [N39 0] UTI (urinary tract infection)     8/7/2020  1:25 AM Isela Georgis Add [Z93 59] Chronic suprapubic catheter ([facility] )     8/7/2020  1:25 AM Isela Georgis Add [R10 84] Generalized abdominal pain     8/7/2020  1:25 AM Isela Georgis Add [R11 0] Nausea     8/7/2020  1:31 AM Isela Georgis Add [E11 9] Diabetes mellitus ([facility] )     8/7/2020  1:31 AM Isela Georgis Add [G35] [facility] sclerosis ([facility] )     8/7/2020  2:17 AM Isela Zarates Add [R00 0] Tachycardia     8/7/2020  2:48 AM Isela Zarates Modify [R53 1] Generalized weakness     8/7/2020  2:48 AM Isela Zarates Modify [N39 0] UTI (urinary tract infection)     8/7/2020  2:48 AM Isela Zarates Add [K86 89] Pancreatic mass     8/7/2020  2:48 AM Isela Zarates Add [K59 00] Constipation, unspecified constipation type       ED Disposition     ED Disposition Condition Date/Time Comment    Admit Stable Fri Aug 7, 2020  2:49 AM Case was discussed with LISSETTE and the patient's admission status was agreed to be Admission Status: inpatient status to the service of Dr Cullen Patton   Follow-up Information    None         Current Discharge Medication List      CONTINUE these medications which have NOT CHANGED    Details   albuterol (PROVENTIL HFA,VENTOLIN HFA) 90 mcg/act inhaler Inhale 2 puffs every 6 (six) hours as needed for wheezing      ALPRAZolam (XANAX) 0 25 mg tablet Take 0 25 mg by mouth daily at bedtime as needed for anxiety or sleep        amLODIPine (NORVASC) 10 mg tablet Take 10 mg by mouth daily  atorvastatin (LIPITOR) 20 mg tablet Take 20 mg by mouth daily at bedtime  baclofen 10 mg tablet Take 2 5 mg by mouth 2 (two) times a day Takes 1 tablet in am and 1 tablet @ 2pm      clopidogrel (PLAVIX) 75 mg tablet Take 1 tablet (75 mg total) by mouth daily  Refills: 0    Associated Diagnoses: Chronic suprapubic catheter ([facility] ); Neurogenic bladder; Cellulitis      dextromethorphan-guaifenesin (MUCINEX DM)  MG per 12 hr tablet Take 1 tablet by mouth every 12 (twelve) hours as needed for cough  Qty: 14 tablet, Refills: 0    Associated Diagnoses: Cough      furosemide (LASIX) 20 mg tablet Take 40 mg by mouth daily        gabapentin (NEURONTIN) 300 mg capsule Take 1 capsule (300 mg total) by mouth 2 (two) times a day AND 2 capsules (600 mg total) daily at bedtime    Qty: 120 capsule, Refills: 5    Associated Diagnoses: History of CVA (cerebrovascular accident); Multiple sclerosis (HCC)      metFORMIN (GLUCOPHAGE) 500 mg tablet Take 1,000 mg by mouth 2 (two) times a day with meals        pantoprazole (PROTONIX) 40 mg tablet TAKE 1 TABLET TWICE DAILY 30 MINUTES BEFORE BREAKFAST AND DINNER  Qty: 180 tablet, Refills: 1    Associated Diagnoses: Gastroesophageal reflux disease without esophagitis      sitaGLIPtin (JANUVIA) 100 mg tablet Take 100 mg by mouth daily      brimonidine-timolol (COMBIGAN) 0 2-0 5 % Administer 1 drop to both eyes every 12 (twelve) hours      linaGLIPtin 5 MG TABS Take 5 mg by mouth daily      losartan (COZAAR) 50 mg tablet Take 50 mg by mouth daily  potassium chloride (K-DUR,KLOR-CON) 10 mEq tablet Take 1 tablet (10 mEq total) by mouth daily  Refills: 0    Associated Diagnoses: Chronic suprapubic catheter ([address] ); Neurogenic bladder; Cellulitis      propranolol (INDERAL LA) 60 mg 24 hr capsule TAKE 1 CAPSULE DAILY  Qty: 90 capsule, Refills: 3    Associated Diagnoses: Essential hypertension      sucralfate (CARAFATE) 1 g tablet Take 1 tablet (1 g total) by mouth 2 (two) times a day  Refills: 0    Associated Diagnoses: Chronic suprapubic catheter ([address] ); Neurogenic bladder; Cellulitis           No discharge procedures on file      PDMP Review     None          ED Provider  Electronically Signed by           Adama Mejia  08/07/20 4467

## 2020-08-07 NOTE — PLAN OF CARE
Problem: Potential for Falls  Goal: Patient will remain free of falls  Description: INTERVENTIONS:  - Assess patient frequently for physical needs  -  Identify cognitive and physical deficits and behaviors that affect risk of falls  -  [place] fall precautions as indicated by assessment   - Educate patient/family on patient safety including physical limitations  - Instruct patient to call for assistance with activity based on assessment  - Modify environment to reduce risk of injury  - Consider OT/PT consult to assist with strengthening/mobility  Outcome: Progressing     Problem: Prexisting or High Potential for Compromised Skin Integrity  Goal: Skin integrity is maintained or improved  Description: INTERVENTIONS:  - Identify patients at risk for skin breakdown  - Assess and monitor skin integrity  - Assess and monitor nutrition and hydration status  - Monitor labs   - Assess for incontinence   - Turn and reposition patient  - Assist with mobility/ambulation  - Relieve pressure over bony prominences  - Avoid friction and shearing  - Provide appropriate hygiene as needed including keeping skin clean and dry  - Evaluate need for skin moisturizer/barrier cream  - Collaborate with interdisciplinary team   - Patient/family teaching  - Consider wound care consult   Outcome: Progressing     Problem: Nutrition/Hydration-ADULT  Goal: Nutrient/Hydration intake appropriate for improving, restoring or maintaining nutritional needs  Description: Monitor and assess patient's nutrition/hydration status for malnutrition  Collaborate with interdisciplinary team and initiate plan and interventions as ordered  Monitor patient's weight and dietary intake as ordered or per policy  Utilize nutrition screening tool and intervene as necessary  Determine patient's food preferences and provide high-protein, high-caloric foods as appropriate       INTERVENTIONS:  - Monitor oral intake, urinary output, labs, and treatment plans  - Assess nutrition and hydration status and recommend course of action  - Evaluate amount of meals eaten  - Assist patient with eating if necessary   - Allow adequate time for meals  - Recommend/ encourage appropriate diets, oral nutritional supplements, and vitamin/mineral supplements  - Order, calculate, and assess calorie counts as needed  - Recommend, monitor, and adjust tube feedings and TPN/PPN based on assessed needs  - Assess need for intravenous fluids  - Provide specific nutrition/hydration education as appropriate  - Include patient/family/caregiver in decisions related to nutrition  Outcome: Progressing     Problem: PAIN - ADULT  Goal: Verbalizes/displays adequate comfort level or baseline comfort level  Description: Interventions:  - Encourage patient to monitor pain and request assistance  - Assess pain using appropriate pain scale  - Administer analgesics based on type and severity of pain and evaluate response  - Implement non-pharmacological measures as appropriate and evaluate response  - Consider cultural and social influences on pain and pain management  - Notify physician/advanced practitioner if interventions unsuccessful or patient reports new pain  Outcome: Progressing     Problem: INFECTION - ADULT  Goal: Absence or prevention of progression during hospitalization  Description: INTERVENTIONS:  - Assess and monitor for signs and symptoms of infection  - Monitor lab/diagnostic results  - Monitor all insertion sites, i e  indwelling lines, tubes, and drains  - Monitor endotracheal if appropriate and nasal secretions for changes in amount and color  - [place] appropriate cooling/warming therapies per order  - Administer medications as ordered  - Instruct and encourage patient and family to use good hand hygiene technique  - Identify and instruct in appropriate isolation precautions for identified infection/condition  Outcome: Progressing     Problem: DISCHARGE PLANNING  Goal: Discharge to home or other facility with appropriate resources  Description: INTERVENTIONS:  - Identify barriers to discharge w/patient and caregiver  - Arrange for needed discharge resources and transportation as appropriate  - Identify discharge learning needs (meds, wound care, etc )  - Arrange for interpretive services to assist at discharge as needed  - Refer to Case Management Department for coordinating discharge planning if the patient needs post-hospital services based on physician/advanced practitioner order or complex needs related to functional status, cognitive ability, or social support system  Outcome: Progressing     Problem: Knowledge Deficit  Goal: Patient/family/caregiver demonstrates understanding of disease process, treatment plan, medications, and discharge instructions  Description: Complete learning assessment and assess knowledge base    Interventions:  - Provide teaching at level of understanding  - Provide teaching via preferred learning methods  Outcome: Progressing     Problem: GASTROINTESTINAL - ADULT  Goal: Minimal or absence of nausea and/or vomiting  Description: INTERVENTIONS:  - Administer IV fluids if ordered to ensure adequate hydration  - Maintain NPO status until nausea and vomiting are resolved  - Nasogastric tube if ordered  - Administer ordered antiemetic medications as needed  - Provide nonpharmacologic comfort measures as appropriate  - Advance diet as tolerated, if ordered  - Consider nutrition services referral to assist patient with adequate nutrition and appropriate food choices  Outcome: Progressing     Problem: GENITOURINARY - ADULT  Goal: Maintains or returns to baseline urinary function  Description: INTERVENTIONS:  - Assess urinary function  - Encourage oral fluids to ensure adequate hydration if ordered  - Administer IV fluids as ordered to ensure adequate hydration  - Administer ordered medications as needed  - Offer frequent toileting  - Follow urinary retention protocol if ordered  Outcome: Progressing     Problem: METABOLIC, FLUID AND ELECTROLYTES - ADULT  Goal: Electrolytes maintained within normal limits  Description: INTERVENTIONS:  - Monitor labs and assess patient for signs and symptoms of electrolyte imbalances  - Administer electrolyte replacement as ordered  - Monitor response to electrolyte replacements, including repeat lab results as appropriate  - Instruct patient on fluid and nutrition as appropriate  Outcome: Progressing  Goal: Fluid balance maintained  Description: INTERVENTIONS:  - Monitor labs   - Monitor I/O and WT  - Instruct patient on fluid and nutrition as appropriate  - Assess for signs & symptoms of volume excess or deficit  Outcome: Progressing     Problem: SKIN/TISSUE INTEGRITY - ADULT  Goal: Skin integrity remains intact  Description: INTERVENTIONS  - Identify patients at risk for skin breakdown  - Assess and monitor skin integrity  - Assess and monitor nutrition and hydration status  - Monitor labs (i e  albumin)  - Assess for incontinence   - Turn and reposition patient  - Assist with mobility/ambulation  - Relieve pressure over bony prominences  - Avoid friction and shearing  - Provide appropriate hygiene as needed including keeping skin clean and dry  - Evaluate need for skin moisturizer/barrier cream  - Collaborate with interdisciplinary team (i e  Nutrition, Rehabilitation, etc )   - Patient/family teaching  Outcome: Progressing

## 2020-08-07 NOTE — UTILIZATION REVIEW
Initial Clinical Review    Admission: Date/Time/Statement:   Admission Orders (From admission, onward)     Ordered        08/07/20 0251  Inpatient Admission (expected length of stay for this patient Order details is greater than two midnights)  Once                   Orders Placed This Encounter   Procedures    Inpatient Admission (expected length of stay for this patient Order details is greater than two midnights)     Standing Status:   Standing     Number of Occurrences:   1     Order Specific Question:   Admitting Physician     Answer:   Katarzyna Bautista [[id number]]     Order Specific Question:   Level of Care     Answer:   Med Surg [[id number]]     Order Specific Question:   Estimated length of stay     Answer:   More than 2 Midnights     Order Specific Question:   Certification     Answer:   I certify that inpatient services are medically necessary for this patient for a duration of greater than two midnights  See H&P and MD Progress Notes for additional information about the patient's course of treatment  ED Arrival Information     Expected Arrival Acuity Means of Arrival Escorted By Service Admission Type    - 8/6/2020 23:31 [facility] ([address]) Hospitalist Urgent    Arrival Complaint    weakness        Chief Complaint   Patient presents with    Weakness - Generalized     bedbound from previous stroke, reports generalized weakness, abdominal pain and chills starting today, tylenol PTA     Assessment/Plan:   69  Y O male presents to ED  Via  EMS from home with  Abdominal pain and weakness, recent constipation  PMH  Is  MS, neurogenic bladder,  W/c  Bound, chronic supra pubic cath,    CVA and  DM  CT scan shows  Incidental pancreatic mass,  Cystitis and  Pyelo  Labs  Reveal  Elevated  WBC  And  UTI    Admit  Ip with   UTI and plan is  SHARON, monitor labs, urology consult and  MRI, IP or   OP F/U>        ED Triage Vitals   Temperature Pulse Respirations Blood Pressure SpO2   08/06/20 2335 08/06/20 2335 08/06/20 2335 08/06/20 2340 08/06/20 2335   98 °F (36 7 °C) (!) 113 17 159/80 95 %      Temp Source Heart Rate Source Patient Position - Orthostatic VS BP Location FiO2 (%)   08/07/20 0330 08/06/20 2335 08/07/20 0107 08/07/20 0107 --   Temporal Monitor Sitting Left arm       Pain Score       08/07/20 0331       7          Wt Readings from Last 1 Encounters:   05/15/20 83 3 kg (183 lb 10 3 oz)     Additional Vital Signs:   08/07/20 0735   97 8 °F (36 6 °C)   89   --   136/73   96 %   None (Room air)   Lying    08/07/20 0330   97 7 °F (36 5 °C)   91   16   153/69   94 %   --   Lying    08/07/20 0238   --   97   16   151/73   97 %   None (Room air)   Lying    08/07/20 0107   --   116Abnormal     20   157/74   96 %   None (Room air)   Sitting    08/06/20 2340   --   --   --   159/80   --   --   --    08/06/20 2335   98 °F (36 7 °C)   113Abnormal     17   --   95 %   None (Room air)            Pertinent Labs/Diagnostic Test Results:   Ct  Abd/pelvis  ( 8/7)     The urinary bladder is decompressed by a suprapubic catheter   The wall of the urinary bladder appears thickened and somewhat edematous   Additionally, there is some urothelial enhancement involving the right ureter   These findings suggest urinary tract    infection/cystitis and possibly pyelonephritis   Clinical correlation, laboratory correlation and follow-up is recommended  Constipation with findings suggesting stercoral colitis, as described above   Please see discussion   No evidence of small bowel obstruction  There is a questionable hypodensity within the uncinate process of the pancreas, measuring approximately 11 mm   A mass in this area should be excluded   Follow-up is recommended   Nonemergent outpatient MRI is recommended for further characterization,   if there are no contraindications  Mild fatty infiltration of the liver     EKG  NSR      Results from last 7 days   Lab Units 08/07/20  0505 08/07/20  0000   WBC Thousand/uL 12 18* 13 50*   HEMOGLOBIN g/dL 15 3 16 7   HEMATOCRIT % 45 9 50 0*   PLATELETS Thousands/uL 314 361   NEUTROS ABS Thousands/µL  --  10 03*         Results from last 7 days   Lab Units 08/07/20  0505 08/07/20  0000   SODIUM mmol/L 133* 133*   POTASSIUM mmol/L 4 1 3 9   CHLORIDE mmol/L 98* 97*   CO2 mmol/L 25 26   ANION GAP mmol/L 10 10   BUN mg/dL 9 10   CREATININE mg/dL 1 07 0 92   EGFR ml/min/1 73sq m 69 83   CALCIUM mg/dL 9 1 9 5   MAGNESIUM mg/dL  --  1 7     Results from last 7 days   Lab Units 08/07/20  0000   AST U/L 12   ALT U/L 21   ALK PHOS U/L 106   TOTAL PROTEIN g/dL 8 1   ALBUMIN g/dL 3 4*   TOTAL BILIRUBIN mg/dL 0 54     Results from last 7 days   Lab Units 08/07/20  1115 08/07/20  0738   POC GLUCOSE mg/dl 254* 183*     Results from last 7 days   Lab Units 08/07/20  0505 08/07/20  0000   GLUCOSE RANDOM mg/dL 197* 211*         Results from last 7 days   Lab Units 08/07/20  0505   HEMOGLOBIN A1C % 9 6*   EAG mg/dl 229       Results from last 7 days   Lab Units 08/07/20  0000   CK TOTAL U/L 51     Results from last 7 days   Lab Units 08/07/20  0000   TROPONIN I ng/mL <0 02         Results from last 7 days   Lab Units 08/07/20  0000   PROTIME seconds 12 1   INR  0 92   PTT seconds 27             Results from last 7 days   Lab Units 08/07/20  0000   LACTIC ACID mmol/L 1 3                   Results from last 7 days   Lab Units 08/07/20  0032   CLARITY UA  Cloudy   COLOR UA  Yellow   SPEC GRAV UA  1 020   PH UA  7 5   GLUCOSE UA mg/dl 500 (1/2%)*   KETONES UA mg/dl 40 (2+)*   BLOOD UA  Small*   PROTEIN UA mg/dl 30 (1+)*   NITRITE UA  Positive*   BILIRUBIN UA  Negative   UROBILINOGEN UA E U /dl 0 2   LEUKOCYTES UA  Moderate*   WBC UA /hpf Innumerable*   RBC UA /hpf Field obscured, unable to enumerate*   BACTERIA UA /hpf Field obscured, unable to enumerate*   EPITHELIAL CELLS WET PREP /hpf Field obscured, unable to enumerate*               Results from last 7 days   Lab Units 08/07/20  0010 08/06/20  4032 BLOOD CULTURE  Received in Microbiology Lab  Culture in Progress  Received in Microbiology Lab  Culture in Progress                 ED Treatment:   Medication Administration from 08/06/2020 2331 to 08/07/2020 0320       Date/Time Order Dose Route Action Comments     08/07/2020 0029 sodium chloride 0 9 % infusion 50 mL/hr Intravenous New Bag      08/07/2020 0030 ondansetron (ZOFRAN) injection 4 mg 4 mg Intravenous Given      08/07/2020 0258 ciprofloxacin (CIPRO) IVPB (premix) 400 mg 200 mL 0 mg Intravenous Stopped      08/07/2020 0139 ciprofloxacin (CIPRO) IVPB (premix) 400 mg 200 mL 400 mg Intravenous New Bag      08/07/2020 0155 iohexol (OMNIPAQUE) 350 MG/ML injection (MULTI-DOSE) 100 mL 100 mL Intravenous Given           Present on Admission:   Urinary tract infection   Multiple sclerosis ([facility] )      Admitting Diagnosis: Multiple sclerosis ([facility] ) [G35]  Diabetes mellitus ([facility] ) [E11 9]  Nausea [R11 0]  UTI (urinary tract infection) [N39 0]  Weakness [R53 1]  Tachycardia [R00 0]  Generalized abdominal pain [R10 84]  Pancreatic mass [K86 89]  Chronic suprapubic catheter (HCC) [Z93 59]  Generalized weakness [R53 1]  Constipation, unspecified constipation type [K59 00]  Age/Sex: 69 y o  male  Admission Orders:  Scheduled Medications:  amLODIPine, 10 mg, Oral, Daily  atorvastatin, 20 mg, Oral, HS  ciprofloxacin, 400 mg, Intravenous, Q12H  clopidogrel, 75 mg, Oral, Daily  enoxaparin, 40 mg, Subcutaneous, Daily  furosemide, 40 mg, Oral, Daily  gabapentin, 300 mg, Oral, BID  gabapentin, 600 mg, Oral, HS  insulin lispro, 1-5 Units, Subcutaneous, HS  insulin lispro, 1-6 Units, Subcutaneous, TID AC  losartan, 50 mg, Oral, Daily  pantoprazole, 40 mg, Oral, BID AC  polyethylene glycol, 17 g, Oral, Daily  propranolol, 60 mg, Oral, Daily  senna, 1 tablet, Oral, Daily      Continuous IV Infusions:  sodium chloride, 50 mL/hr, Intravenous, Continuous      PRN Meds:  acetaminophen, 650 mg, Oral, Q6H PRN  albuterol, 2 puff, Inhalation, Q6H PRN  ALPRAZolam, 0 25 mg, Oral, HS PRN  calcium carbonate, 1,000 mg, Oral, Daily PRN  dextromethorphan-guaiFENesin, 10 mL, Oral, Q6H PRN  ondansetron, 4 mg, Intravenous, Q6H PRN        IP CONSULT TO UROLOGY  IP CONSULT TO CASE MANAGEMENT    Network Utilization Review Department  [email or web address]  ATTENTION: Please call with any questions or concerns to [phone] and carefully listen to the prompts so that you are directed to the right person  All voicemails are confidential   Candia Siemens all requests for admission clinical reviews, approved or denied determinations and any other requests to dedicated fax number below belonging to the campus where the patient is receiving treatment   List of dedicated fax numbers for the Facilities:  [address] DENIALS (Administrative/Medical Necessity) [phone]   [address] (Maternity/NICU/Pediatrics) [phone]   John Santo [phone]   Lilli Cheung [phone]   [facility] [phone]   [place] [phone]   [address] [place] [address] [phone]   [facility]  [phone]   [address] [facility] [address]  [address] [facility] [address] [facility] [phone]

## 2020-08-07 NOTE — ASSESSMENT & PLAN NOTE
Incidental finding on CT  Pt states he has a sister who  from pancreatic cancer  Will need MRI either inpt or outpt

## 2020-08-07 NOTE — PROCEDURES
2020    Grace Daley  1950  [id number]    Diagnosis  Chief Complaint     Weakness - Generalized          Pre-operative Diagnosis: NGB, SPT with UTI      Post-operative Diagnosis: same    Time Out: Verbal timeout performed, patient confirmed name, , procedure  No laterality applicable  Consent: Patient was agreeable and gave verbal consent before start of procedure  Plan  Change Q4 weeks    Procedure: Suprapubic Tube Change   Bladder catheterization    Date/Time: 2020 4:52 PM  Performed by: Margarette Rose PA-C  Authorized by: Margarette Rose PA-C     Patient location:  Bedside  Other Assisting Provider: Yes (comment)    Consent:     Consent obtained:  Verbal    Consent given by:  Patient    Risks discussed:  False passage, incomplete procedure and pain    Alternatives discussed:  No treatment  Universal protocol:     Procedure explained and questions answered to patient or proxy's satisfaction: yes      Patient identity confirmed:  Verbally with patient and arm band  Pre-procedure details:     Procedure purpose:  Therapeutic  Anesthesia (see MAR for exact dosages): Anesthesia method:  None  Procedure details:     Bladder irrigation: no      Catheter insertion:  Indwelling    Approach: percutaneous      Catheter type: Cage    Catheter size:  24 Fr    Number of attempts:  1    Successful placement: yes      Urine characteristics:  Cloudy and yellow    Procedure performed by provider due to: SPT  Post-procedure details:     Patient tolerance of procedure: Tolerated well, no immediate complications        Current catheter removed without difficulty after deflation of an intact balloon  Site prepped with Betadine, new 24F  latex spt change via aseptic technique without incident, 8 ml balloon inflated with sterile water  irrigated easily for clear return, no spasm noted  Patient tolerated well  Attached to drainage bag  Complications:  None; patient tolerated the procedure well  Condition: stable      Plan  Maintain SPT to straight drainage  Flush daily using Valeri syringe and 60 ml NSS  Next exchange in 4 weeks  No further  intervention required      Nini Calles PA-C

## 2020-08-07 NOTE — CONSULTS
Consult - Urology   Gerry Wyatt 1950, 69 y o  male MRN: [id number]    Unit/Bed#: E5 -01 Encounter: [id number]    * Urinary tract infection  Assessment & Plan  Complicated UTI in the setting of neurogenic bladder with history of suprapubic tube, maintained to gravity drainage  Changed bedside without event for 24 Japanese suprapubic catheter, 8 cc placed in the balloon, urine aspirated  Patient tolerated this well  Maintained to gravity drainage  Current coverage with Cipro, IV  Culture pending  Tailor antibiotics to culture results  Outpatient urologic follow-up with plans for home nursing to change his Cage on approximately 9/7/2020  Keep scheduled follow-up with Dr Ciara Johns for repeat cystoscopy in December  Bedside rounds performed with Michelle BETANCUR  Discussed with Dr Darryl Ibrahim    Urology will sign off but remain available for any further inpatient needs  Please feel free to call with questions or concerns  Subjective/Objective     Subjective:   CC: "I had pain and fevers and my tube is due to be changed "  HPI:  Marie Peoples is a 69year old male with hx of MS, NGB, maintained on SPT x 4 years, changed Q4 weeks  Reported abdominal pain and fevers  Found to have UTI, based on urinalysis with culture pending  Reports fevers at home up to 104  Suprapubic catheter has been draining well without occlusion  He did have pain but does not feel that this was secondary to obstruction  He reports that it was irritative symptoms  His last screening cystoscopy was with Dr Ciara Johns in December of 2019, due for repeat cystoscopy December of 2020  CT scan with decompressed bladder, bladder wall thickening possible right ureteral thickening  Concern for possible pyelonephritis  Patient denies any flank pain  Leukocytosis, improving  Creatinine is at baseline  ROS:  Review of Systems   Constitutional: Negative for activity change and appetite change     HENT: Negative for congestion and ear pain     Eyes: Negative for pain  Respiratory: Negative for cough and shortness of breath  Cardiovascular: Negative for chest pain and palpitations  Gastrointestinal: Negative for abdominal distention, abdominal pain, blood in stool, constipation, diarrhea and nausea  Genitourinary: Negative for difficulty urinating, dysuria and hematuria  Musculoskeletal: Negative for arthralgias and myalgias  Skin: Negative for rash  Allergic/Immunologic: Negative for immunocompromised state  Neurological: Negative for dizziness and headaches  Hematological: Negative for adenopathy  Does not bruise/bleed easily  Psychiatric/Behavioral: Negative for agitation  The patient is not nervous/anxious  Objective:  Vitals: Blood pressure 136/73, pulse 95, temperature 98 1 °F (36 7 °C), temperature source Temporal, resp  rate 18, height 5' 5" (1 651 m), weight 83 3 kg (183 lb 10 3 oz), SpO2 95 %  ,Body mass index is 30 56 kg/m²  Intake/Output Summary (Last 24 hours) at 8/7/2020 1651  Last data filed at 8/7/2020 1405  Gross per 24 hour   Intake 680 ml   Output 1325 ml   Net -645 ml       Invasive Devices     Peripheral Intravenous Line            Peripheral IV 08/06/20 Right Antecubital less than 1 day          Drain            Suprapubic Catheter Double-lumen 24 Fr  458 days                Physical Exam  Vitals signs and nursing note reviewed  Constitutional:       General: He is not in acute distress  Appearance: He is well-developed  He is not ill-appearing or diaphoretic  Comments: 79-year-old male, well appearing  No acute distress  HENT:      Head: Normocephalic and atraumatic  Eyes:      Conjunctiva/sclera: Conjunctivae normal    Neck:      Musculoskeletal: Normal range of motion and neck supple  Trachea: No tracheal deviation  Cardiovascular:      Rate and Rhythm: Normal rate and regular rhythm  Heart sounds: Normal heart sounds  No murmur     Pulmonary:      Effort: Pulmonary effort is normal  No respiratory distress  Breath sounds: Normal breath sounds  No wheezing  Abdominal:      General: Bowel sounds are normal  There is no distension  Palpations: Abdomen is soft  There is no mass  Tenderness: There is no abdominal tenderness  Hernia: No hernia is present  Comments: Obese, nontender  Suprapubic catheter in place  Draining clear yellow urine  Musculoskeletal: Normal range of motion  Skin:     General: Skin is warm and dry  Coloration: Skin is not pale  Findings: No erythema or rash  Neurological:      Mental Status: He is alert and oriented to person, place, and time  Psychiatric:         Behavior: Behavior normal  Behavior is cooperative  Thought Content:  Thought content normal          Judgment: Judgment normal          History:    Past Medical History:   Diagnosis Date    Acute laryngitis     Acute nonsuppurative otitis media, unspecified laterality     Arm weakness     Arthritis     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HCC)     Bladder infection     Bronchitis     Constipation     Cough     Diabetes mellitus (HCC)     Dizziness     Dysfunction of eustachian tube     Erectile dysfunction of non-organic origin     Fatigue     Glaucoma     Hiatal hernia     Hypertension     Imbalance     Leg muscle spasm     MS (multiple sclerosis) (HCC)     Nephrolithiasis     No natural teeth     Sinus pain     Spinal stenosis     Strain of thoracic region     Stroke ([address]     Suprapubic catheter ([address] )      Past Surgical History:   Procedure Laterality Date    APPENDECTOMY      BRAIN SURGERY      Coil placed in aneurysm    CYSTOSCOPY      CYSTOSCOPY      CYSTOSCOPY  06/11/2018    EYE SURGERY      transscleral cyclophotocoagulation noncontact YAG laser    MYRINGOTOMY      with ventilation tube insertion    VA REMOVE BLADDER STONE,<2 5 CM N/A 5/7/2019    Procedure: CYSTOSCOPY, holmium laser litholapaxy of bladder stones, EXCHANGE OF SP TUBE;  Surgeon: Azeem Andino MD;  [facility]: [facility];  Service: Urology    SUPRAPUBIC CATHETER INSERTION       Family History   Problem Relation Age of Onset    Heart attack Mother     Stroke Mother     Heart attack Father     Anuerysm Father         In Stomach      Social History     Socioeconomic History    Marital status: Single     Spouse name: None    Number of children: None    Years of education: None    Highest education level: None   Occupational History    Occupation:    retired   Social Needs    Financial resource strain: None    Food insecurity     Worry: None     Inability: None    Transportation needs     Medical: None     Non-medical: None   Tobacco Use    Smoking status: Former Smoker     Types: Cigarettes    Smokeless tobacco: Never Used    Tobacco comment: smoked for 15-20 years, stopped about 25 years ago   Substance and Sexual Activity    Alcohol use: Not Currently    Drug use: No    Sexual activity: Never   Lifestyle    Physical activity     Days per week: None     Minutes per session: None    Stress: None   Relationships    Social connections     Talks on phone: None     Gets together: None     Attends [employer] service: None     Active member of club or organization: None     Attends meetings of clubs or organizations: None     Relationship status: None    Intimate partner violence     Fear of current or ex partner: None     Emotionally abused: None     Physically abused: None     Forced sexual activity: None   Other Topics Concern    None   Social History Narrative    None       Imaging:  CT with decompressed bladder, thickened edematous bladder consistent with cystitis, urothelial enhancement involving the right ureter  Imaging reviewed - both report and images personally reviewed  Lab Results:  I have personally reviewed pertinent labs    Results from last 7 days   Lab Units 08/07/20  0505 08/07/20  0000   WBC Thousand/uL 12 18* 13 50*   HEMOGLOBIN g/dL 15 3 16 7   PLATELETS Thousands/uL 314 361     Results from last 7 days   Lab Units 08/07/20  0505 08/07/20  0000   SODIUM mmol/L 133* 133*   POTASSIUM mmol/L 4 1 3 9   CHLORIDE mmol/L 98* 97*   CO2 mmol/L 25 26   BUN mg/dL 9 10   CREATININE mg/dL 1 07 0 92   EGFR ml/min/1 73sq m 69 83   CALCIUM mg/dL 9 1 9 5   AST U/L  --  12   ALT U/L  --  21   ALK PHOS U/L  --  106     Results from last 7 days   Lab Units 08/07/20  0010 08/06/20  2358   BLOOD CULTURE  Received in Microbiology Lab  Culture in Progress  Received in Microbiology Lab  Culture in Progress             Nella Gill PA-C  Date: 8/7/2020 Time: 4:51 PM

## 2020-08-08 LAB
BASOPHILS # BLD AUTO: 0.09 THOUSANDS/ΜL (ref 0–0.1)
BASOPHILS NFR BLD AUTO: 1 % (ref 0–1)
EOSINOPHIL # BLD AUTO: 0.06 THOUSAND/ΜL (ref 0–0.61)
EOSINOPHIL NFR BLD AUTO: 0 % (ref 0–6)
ERYTHROCYTE [DISTWIDTH] IN BLOOD BY AUTOMATED COUNT: 13.2 % (ref 11.6–15.1)
GLUCOSE SERPL-MCNC: 195 MG/DL (ref 65–140)
GLUCOSE SERPL-MCNC: 221 MG/DL (ref 65–140)
GLUCOSE SERPL-MCNC: 221 MG/DL (ref 65–140)
GLUCOSE SERPL-MCNC: 234 MG/DL (ref 65–140)
HCT VFR BLD AUTO: 44 % (ref 36.5–49.3)
HGB BLD-MCNC: 14.8 G/DL (ref 12–17)
IMM GRANULOCYTES # BLD AUTO: 0.08 THOUSAND/UL (ref 0–0.2)
IMM GRANULOCYTES NFR BLD AUTO: 1 % (ref 0–2)
LYMPHOCYTES # BLD AUTO: 2.09 THOUSANDS/ΜL (ref 0.6–4.47)
LYMPHOCYTES NFR BLD AUTO: 15 % (ref 14–44)
MCH RBC QN AUTO: 29.4 PG (ref 26.8–34.3)
MCHC RBC AUTO-ENTMCNC: 33.6 G/DL (ref 31.4–37.4)
MCV RBC AUTO: 87 FL (ref 82–98)
MONOCYTES # BLD AUTO: 1.21 THOUSAND/ΜL (ref 0.17–1.22)
MONOCYTES NFR BLD AUTO: 9 % (ref 4–12)
NEUTROPHILS # BLD AUTO: 10.36 THOUSANDS/ΜL (ref 1.85–7.62)
NEUTS SEG NFR BLD AUTO: 74 % (ref 43–75)
NRBC BLD AUTO-RTO: 0 /100 WBCS
PLATELET # BLD AUTO: 267 THOUSANDS/UL (ref 149–390)
PMV BLD AUTO: 7.9 FL (ref 8.9–12.7)
RBC # BLD AUTO: 5.04 MILLION/UL (ref 3.88–5.62)
WBC # BLD AUTO: 13.89 THOUSAND/UL (ref 4.31–10.16)

## 2020-08-08 PROCEDURE — 85025 COMPLETE CBC W/AUTO DIFF WBC: CPT | Performed by: INTERNAL MEDICINE

## 2020-08-08 PROCEDURE — 99233 SBSQ HOSP IP/OBS HIGH 50: CPT | Performed by: INTERNAL MEDICINE

## 2020-08-08 PROCEDURE — 82948 REAGENT STRIP/BLOOD GLUCOSE: CPT

## 2020-08-08 RX ORDER — MAGNESIUM CARB/ALUMINUM HYDROX 105-160MG
148 TABLET,CHEWABLE ORAL ONCE
Status: DISCONTINUED | OUTPATIENT
Start: 2020-08-08 | End: 2020-08-09 | Stop reason: HOSPADM

## 2020-08-08 RX ADMIN — PANTOPRAZOLE SODIUM 40 MG: 40 TABLET, DELAYED RELEASE ORAL at 16:43

## 2020-08-08 RX ADMIN — POLYETHYLENE GLYCOL 3350 17 G: 17 POWDER, FOR SOLUTION ORAL at 08:52

## 2020-08-08 RX ADMIN — PANTOPRAZOLE SODIUM 40 MG: 40 TABLET, DELAYED RELEASE ORAL at 06:29

## 2020-08-08 RX ADMIN — INSULIN LISPRO 2 UNITS: 100 INJECTION, SOLUTION INTRAVENOUS; SUBCUTANEOUS at 08:53

## 2020-08-08 RX ADMIN — SENNOSIDES 8.6 MG: 8.6 TABLET ORAL at 08:53

## 2020-08-08 RX ADMIN — GABAPENTIN 300 MG: 300 CAPSULE ORAL at 08:53

## 2020-08-08 RX ADMIN — FUROSEMIDE 40 MG: 40 TABLET ORAL at 08:53

## 2020-08-08 RX ADMIN — INSULIN LISPRO 2 UNITS: 100 INJECTION, SOLUTION INTRAVENOUS; SUBCUTANEOUS at 12:04

## 2020-08-08 RX ADMIN — CLOPIDOGREL BISULFATE 75 MG: 75 TABLET ORAL at 08:53

## 2020-08-08 RX ADMIN — INSULIN LISPRO 1 UNITS: 100 INJECTION, SOLUTION INTRAVENOUS; SUBCUTANEOUS at 21:36

## 2020-08-08 RX ADMIN — INSULIN LISPRO 3 UNITS: 100 INJECTION, SOLUTION INTRAVENOUS; SUBCUTANEOUS at 16:43

## 2020-08-08 RX ADMIN — LOSARTAN POTASSIUM 50 MG: 50 TABLET, FILM COATED ORAL at 08:53

## 2020-08-08 RX ADMIN — CIPROFLOXACIN 400 MG: 2 INJECTION, SOLUTION INTRAVENOUS at 14:30

## 2020-08-08 RX ADMIN — CIPROFLOXACIN 400 MG: 2 INJECTION, SOLUTION INTRAVENOUS at 01:37

## 2020-08-08 RX ADMIN — GABAPENTIN 600 MG: 300 CAPSULE ORAL at 21:34

## 2020-08-08 RX ADMIN — ATORVASTATIN CALCIUM 20 MG: 20 TABLET, FILM COATED ORAL at 21:34

## 2020-08-08 RX ADMIN — GABAPENTIN 300 MG: 300 CAPSULE ORAL at 16:43

## 2020-08-08 RX ADMIN — AMLODIPINE BESYLATE 10 MG: 10 TABLET ORAL at 08:53

## 2020-08-08 RX ADMIN — ACETAMINOPHEN 650 MG: 325 TABLET ORAL at 20:23

## 2020-08-08 RX ADMIN — ENOXAPARIN SODIUM 40 MG: 40 INJECTION SUBCUTANEOUS at 08:52

## 2020-08-08 RX ADMIN — PROPRANOLOL HYDROCHLORIDE 60 MG: 60 CAPSULE, EXTENDED RELEASE ORAL at 10:12

## 2020-08-08 RX ADMIN — ALPRAZOLAM 0.25 MG: 0.25 TABLET ORAL at 21:33

## 2020-08-08 RX ADMIN — ACETAMINOPHEN 650 MG: 325 TABLET ORAL at 07:44

## 2020-08-08 NOTE — PROGRESS NOTES
Progress Note -  Pencil 1950, 69 y o  male MRN: [id number]    Unit/Bed#: E5 -01 Encounter: [id number]    Primary Care Provider: Noreen Mills DO   Date and time admitted to hospital: 2020 11:33 PM    * Urinary tract infection  Assessment & Plan  Admitted for UTI  Patient with hx of neurogenic bladder due to MS  Started on ciprofloxacin based on previous culture  Progressively improving, continue to monitor  Had 1 episode of high-grade fever last night however clinically improving  Leukocytosis stable  Continue to monitor urine culture    Pancreatic mass  Assessment & Plan  Incidental finding on CT  Pt states he has a sister who  from pancreatic cancer  Will need MRI as an outpatient    History of CVA (cerebrovascular accident)  Assessment & Plan  Hx CVA 10/18    Diabetes mellitus [facility])  Assessment & Plan  Lab Results   Component Value Date    HGBA1C 9 6 (H) 2020     Recent Labs     20  1549 20  2122 20  2131 20  0733   POCGLU 198* 159* 186* 195*     Order accuchecks with sliding scale  (P) 000 [id number]    Multiple sclerosis (HCC)  Assessment & Plan  Recent worsening of leg weakness, now wheelchair bound    VTE Pharmacologic Prophylaxis:   Pharmacologic: Heparin    Patient Centered Rounds: I have performed bedside rounds with nursing staff today    Discussions with Specialists or Other Care Team Provider:     Education and Discussions with Family / Patient:       Current Length of Stay: 1 day(s)    Current Patient Status: Inpatient   Certification Statement: The patient will continue to require additional inpatient hospital stay due to UTI    Discharge Plan:  Probably tomorrow    Code Status: Level 1 - Full Code      Subjective:   Patient clinically improving however had 1 spike a fever overnight  Suprapubic catheter changed by Urology  Clear urine draining      Objective:     Vitals:   Temp (24hrs), Av 4 °F (37 4 °C), Min:98 1 °F (36 7 °C), Max:101 8 °F (38 8 °C)    Temp:  [98 1 °F (36 7 °C)-101 8 °F (38 8 °C)] 98 7 °F (37 1 °C)  HR:  [86-95] 87  Resp:  [18] 18  BP: (133-141)/(60-73) 133/60  SpO2:  [91 %-95 %] 91 %  Body mass index is 30 56 kg/m²  Input and Output Summary (last 24 hours):        Intake/Output Summary (Last 24 hours) at 8/8/2020 1033  Last data filed at 8/8/2020 0601  Gross per 24 hour   Intake 540 ml   Output 2000 ml   Net -1460 ml       Physical Exam:     General appearance: alert, appears stated age and cooperative  Head: Normocephalic, without obvious abnormality, atraumatic  Lungs: clear to auscultation bilaterally  Heart: regular rate and rhythm  Abdomen: soft, non-tender, positive bowel sounds   Back: negative  Extremities: extremities atraumatic, no cyanosis or edema  Neurologic: Mental status: Alert, oriented, thought content appropriate    Additional Data:     Labs:    Results from last 7 days   Lab Units 08/08/20  0517   WBC Thousand/uL 13 89*   HEMOGLOBIN g/dL 14 8   HEMATOCRIT % 44 0   PLATELETS Thousands/uL 267   NEUTROS PCT % 74   LYMPHS PCT % 15   MONOS PCT % 9   EOS PCT % 0     Results from last 7 days   Lab Units 08/07/20  0505 08/07/20  0000   SODIUM mmol/L 133* 133*   POTASSIUM mmol/L 4 1 3 9   CHLORIDE mmol/L 98* 97*   CO2 mmol/L 25 26   BUN mg/dL 9 10   CREATININE mg/dL 1 07 0 92   ANION GAP mmol/L 10 10   CALCIUM mg/dL 9 1 9 5   ALBUMIN g/dL  --  3 4*   TOTAL BILIRUBIN mg/dL  --  0 54   ALK PHOS U/L  --  106   ALT U/L  --  21   AST U/L  --  12   GLUCOSE RANDOM mg/dL 197* 211*     Results from last 7 days   Lab Units 08/07/20  0000   INR  0 92     Results from last 7 days   Lab Units 08/08/20  0733 08/07/20  2131 08/07/20  2122 08/07/20  1549 08/07/20  1115 08/07/20  0738   POC GLUCOSE mg/dl 195* 186* 159* 198* 254* 183*     Results from last 7 days   Lab Units 08/07/20  0505   HEMOGLOBIN A1C % 9 6*     Results from last 7 days   Lab Units 08/07/20  0000   LACTIC ACID mmol/L 1 3           * I Have Reviewed All Lab Data Listed Above  * Additional Pertinent Lab Tests Reviewed: [address] [facility] Admission Reviewed    Imaging:    Imaging Reports Reviewed Today Include: images reviewed    Recent Cultures (last 7 days):     Results from last 7 days   Lab Units 08/07/20  0032 08/07/20  0010 08/06/20  2358   BLOOD CULTURE   --  No Growth at 24 hrs  No Growth at 24 hrs     URINE CULTURE  >100,000 cfu/ml Gram Negative Torin Enteric Like*  >100,000 cfu/ml Gram Negative Torin*  --   --        Last 24 Hours Medication List:   Current Facility-Administered Medications   Medication Dose Route Frequency Provider Last Rate    acetaminophen  650 mg Oral Q6H PRN Derald Bracket, PA-C      albuterol  2 puff Inhalation Q6H PRN Derald Bracket, PA-C      ALPRAZolam  0 25 mg Oral HS PRN Derald Bracket, PA-C      amLODIPine  10 mg Oral Daily Derald Bracket, PA-C      atorvastatin  20 mg Oral HS Derald Bracket, PA-C      bisacodyl  10 mg Rectal Daily PRN Ines Paget, MD      calcium carbonate  1,000 mg Oral Daily PRN Derald Bracket, PA-C      ciprofloxacin  400 mg Intravenous Q12H Derald Bracket, PA-C 400 mg (08/08/20 0137)    clopidogrel  75 mg Oral Daily Derald Bracket, PA-C      dextromethorphan-guaiFENesin  10 mL Oral Q6H PRN Derald Bracket, PA-C      enoxaparin  40 mg Subcutaneous Daily Derald Bracket, PA-C      furosemide  40 mg Oral Daily Derald Bracket, PA-C      gabapentin  300 mg Oral BID Derald Bracket, PA-C      gabapentin  600 mg Oral HS Derald Bracket, PA-C      insulin lispro  1-5 Units Subcutaneous HS Derald Bracket, PA-C      insulin lispro  1-6 Units Subcutaneous TID AC Derald Bracket, PA-C      losartan  50 mg Oral Daily Derald Bracket, PA-C      magnesium citrate  148 mL Oral Once Ines Paget, MD      ondansetron  4 mg Intravenous Q6H PRN Derald Bracket, PA-C      pantoprazole  40 mg Oral BID AC Derald Bracket, PA-C      polyethylene glycol  17 g Oral Daily Derald Bracket, PA-C  propranolol  60 mg Oral Daily Nati Jacobs PA-C      senna  1 tablet Oral Daily Nati Jacobs PA-C      sodium chloride  50 mL/hr Intravenous Continuous SHA Jennings 50 mL/hr (08/07/20 0029)        Today, Patient Was Seen By: Mireya Broderick MD    ** Please Note: Dictation voice to text software may have been used in the creation of this document   **

## 2020-08-08 NOTE — ASSESSMENT & PLAN NOTE
Admitted for UTI  Patient with hx of neurogenic bladder due to MS  Started on ciprofloxacin based on previous culture  Progressively improving, continue to monitor  Had 1 episode of high-grade fever last night however clinically improving  Leukocytosis stable  Continue to monitor urine culture

## 2020-08-08 NOTE — ASSESSMENT & PLAN NOTE
Incidental finding on CT  Pt states he has a sister who  from pancreatic cancer  Will need MRI as an outpatient

## 2020-08-08 NOTE — ASSESSMENT & PLAN NOTE
Lab Results   Component Value Date    HGBA1C 9 6 (H) 08/07/2020       Recent Labs     08/07/20  1549 08/07/20 2122 08/07/20  2131 08/08/20  0733   POCGLU 198* 159* 186* 195*       Order accuchecks with sliding scale  (P) 558 [phone]

## 2020-08-08 NOTE — UTILIZATION REVIEW
Continued Stay Review    Date: *20                        Current Patient Class: *inpatient Current Level of Care: medical    HPI:70 y o  male initially admitted on 20 with UTI    Assessment/Plan: Patient has HX MS with neurogenic bladder  Continue IV antibiotics suprapubic cath changed 20  by urology draining clear urine will monitor temp and WBC curve incidental finding on CT scan pancreatic mass his sister  from pancreatic CA will have MRI as outpatient     Pertinent Labs/Diagnostic Results:       Results from last 7 days   Lab Units 20  0517 20  0505 20  0000   WBC Thousand/uL 13 89* 12 18* 13 50*   HEMOGLOBIN g/dL 14 8 15 3 16 7   HEMATOCRIT % 44 0 45 9 50 0*   PLATELETS Thousands/uL 267 314 361   NEUTROS ABS Thousands/µL 10 36*  --  10 03*         Results from last 7 days   Lab Units 20  0505 20  0000   SODIUM mmol/L 133* 133*   POTASSIUM mmol/L 4 1 3 9   CHLORIDE mmol/L 98* 97*   CO2 mmol/L 25 26   ANION GAP mmol/L 10 10   BUN mg/dL 9 10   CREATININE mg/dL 1 07 0 92   EGFR ml/min/1 73sq m 69 83   CALCIUM mg/dL 9 1 9 5   MAGNESIUM mg/dL  --  1 7     Results from last 7 days   Lab Units 20  0000   AST U/L 12   ALT U/L 21   ALK PHOS U/L 106   TOTAL PROTEIN g/dL 8 1   ALBUMIN g/dL 3 4*   TOTAL BILIRUBIN mg/dL 0 54     Results from last 7 days   Lab Units 20  1102 20  0733 20  2131 20  2122 20  1549 20  1115 20  0738   POC GLUCOSE mg/dl 221* 195* 186* 159* 198* 254* 183*     Results from last 7 days   Lab Units 20  0505 20  0000   GLUCOSE RANDOM mg/dL 197* 211*         Results from last 7 days   Lab Units 20  0505   HEMOGLOBIN A1C % 9 6*   EAG mg/dl 229     Results from last 7 days   Lab Units 20  0000   CK TOTAL U/L 51     Results from last 7 days   Lab Units 20  0000   TROPONIN I ng/mL <0 02     Results from last 7 days   Lab Units 20  0000   PROTIME seconds 12 1   INR  0 92 PTT seconds 27     Results from last 7 days   Lab Units 08/07/20  0000   LACTIC ACID mmol/L 1 3       Results from last 7 days   Lab Units 08/07/20  0032   CLARITY UA  Cloudy   COLOR UA  Yellow   SPEC GRAV UA  1 020   PH UA  7 5   GLUCOSE UA mg/dl 500 (1/2%)*   KETONES UA mg/dl 40 (2+)*   BLOOD UA  Small*   PROTEIN UA mg/dl 30 (1+)*   NITRITE UA  Positive*   BILIRUBIN UA  Negative   UROBILINOGEN UA E U /dl 0 2   LEUKOCYTES UA  Moderate*   WBC UA /hpf Innumerable*   RBC UA /hpf Field obscured, unable to enumerate*   BACTERIA UA /hpf Field obscured, unable to enumerate*   EPITHELIAL CELLS WET PREP /hpf Field obscured, unable to enumerate*     Results from last 7 days   Lab Units 08/07/20  0032 08/07/20  0010 08/06/20  2358   BLOOD CULTURE   --  No Growth at 24 hrs  No Growth at 24 hrs     URINE CULTURE  >100,000 cfu/ml Gram Negative Torin Enteric Like*  >100,000 cfu/ml Gram Negative Torin*  --   --            Vital Signs:   Date/Time   Temp   Pulse   Resp   BP   SpO2   O2 Device   Patient Position - Orthostatic VS    08/08/20 1011   98 7 °F (37 1 °C)   --   --   --   --   --   --    08/08/20 0850   100 1 °F (37 8 °C)   --   --   --   --   --   --    08/08/20 0730   101 8 °F (38 8 °C)Abnormal     87   --   133/60   91 %   None (Room air)   Lying    08/07/20 2243   98 4 °F (36 9 °C)   86   18   141/66   94 %   None (Room air)   Lying    08/07/20 1506   98 1 °F (36 7 °C)   95   18   136/73   95 %   None (Room air)   Lying         Medications:   Scheduled Medications:  amLODIPine, 10 mg, Oral, Daily  atorvastatin, 20 mg, Oral, HS  ciprofloxacin, 400 mg, Intravenous, Q12H  clopidogrel, 75 mg, Oral, Daily  enoxaparin, 40 mg, Subcutaneous, Daily  furosemide, 40 mg, Oral, Daily  gabapentin, 300 mg, Oral, BID  gabapentin, 600 mg, Oral, HS  insulin lispro, 1-5 Units, Subcutaneous, HS  insulin lispro, 1-6 Units, Subcutaneous, TID AC  losartan, 50 mg, Oral, Daily  magnesium citrate, 148 mL, Oral, Once  pantoprazole, 40 mg, Oral, BID AC  polyethylene glycol, 17 g, Oral, Daily  propranolol, 60 mg, Oral, Daily  senna, 1 tablet, Oral, Daily      Continuous IV Infusions:  sodium chloride, 50 mL/hr, Intravenous, Continuous      PRN Meds:  acetaminophen, 650 mg, Oral, Q6H PRN  albuterol, 2 puff, Inhalation, Q6H PRN  ALPRAZolam, 0 25 mg, Oral, HS PRN  bisacodyl, 10 mg, Rectal, Daily PRN  calcium carbonate, 1,000 mg, Oral, Daily PRN  dextromethorphan-guaiFENesin, 10 mL, Oral, Q6H PRN  ondansetron, 4 mg, Intravenous, Q6H PRN        Discharge Plan: D    Network Utilization Review Department  [email or web address]  ATTENTION: Please call with any questions or concerns to [phone] and carefully listen to the prompts so that you are directed to the right person  All voicemails are confidential   Sheree Cline all requests for admission clinical reviews, approved or denied determinations and any other requests to dedicated fax number below belonging to the campus where the patient is receiving treatment   List of dedicated fax numbers for the Facilities:  [address] [place] [address] DENIALS (Administrative/Medical Necessity) [phone]   [address] (Maternity/NICU/Pediatrics) [phone]   Darlyn Pacheco [phone]     [address] [phone]   Gisselle Fields [phone]   Eder Murillo [phone]   [address] [facility] [address] [facility] [phone]   [facility]  [phone]   [address] [facility] [address] [facility]  [address] [facility] [address] [facility] [phone]

## 2020-08-08 NOTE — PLAN OF CARE
Problem: Potential for Falls  Goal: Patient will remain free of falls  Description: INTERVENTIONS:  - Assess patient frequently for physical needs  -  Identify cognitive and physical deficits and behaviors that affect risk of falls  -  [place] fall precautions as indicated by assessment   - Educate patient/family on patient safety including physical limitations  - Instruct patient to call for assistance with activity based on assessment  - Modify environment to reduce risk of injury  - Consider OT/PT consult to assist with strengthening/mobility  Outcome: Progressing     Problem: Prexisting or High Potential for Compromised Skin Integrity  Goal: Skin integrity is maintained or improved  Description: INTERVENTIONS:  - Identify patients at risk for skin breakdown  - Assess and monitor skin integrity  - Assess and monitor nutrition and hydration status  - Monitor labs   - Assess for incontinence   - Turn and reposition patient  - Assist with mobility/ambulation  - Relieve pressure over bony prominences  - Avoid friction and shearing  - Provide appropriate hygiene as needed including keeping skin clean and dry  - Evaluate need for skin moisturizer/barrier cream  - Collaborate with interdisciplinary team   - Patient/family teaching  - Consider wound care consult   Outcome: Progressing     Problem: Nutrition/Hydration-ADULT  Goal: Nutrient/Hydration intake appropriate for improving, restoring or maintaining nutritional needs  Description: Monitor and assess patient's nutrition/hydration status for malnutrition  Collaborate with interdisciplinary team and initiate plan and interventions as ordered  Monitor patient's weight and dietary intake as ordered or per policy  Utilize nutrition screening tool and intervene as necessary  Determine patient's food preferences and provide high-protein, high-caloric foods as appropriate       INTERVENTIONS:  - Monitor oral intake, urinary output, labs, and treatment plans  - Assess nutrition and hydration status and recommend course of action  - Evaluate amount of meals eaten  - Assist patient with eating if necessary   - Allow adequate time for meals  - Recommend/ encourage appropriate diets, oral nutritional supplements, and vitamin/mineral supplements  - Order, calculate, and assess calorie counts as needed  - Recommend, monitor, and adjust tube feedings and TPN/PPN based on assessed needs  - Assess need for intravenous fluids  - Provide specific nutrition/hydration education as appropriate  - Include patient/family/caregiver in decisions related to nutrition  Outcome: Progressing     Problem: PAIN - ADULT  Goal: Verbalizes/displays adequate comfort level or baseline comfort level  Description: Interventions:  - Encourage patient to monitor pain and request assistance  - Assess pain using appropriate pain scale  - Administer analgesics based on type and severity of pain and evaluate response  - Implement non-pharmacological measures as appropriate and evaluate response  - Consider cultural and social influences on pain and pain management  - Notify physician/advanced practitioner if interventions unsuccessful or patient reports new pain  Outcome: Progressing     Problem: INFECTION - ADULT  Goal: Absence or prevention of progression during hospitalization  Description: INTERVENTIONS:  - Assess and monitor for signs and symptoms of infection  - Monitor lab/diagnostic results  - Monitor all insertion sites, i e  indwelling lines, tubes, and drains  - Monitor endotracheal if appropriate and nasal secretions for changes in amount and color  - [place] appropriate cooling/warming therapies per order  - Administer medications as ordered  - Instruct and encourage patient and family to use good hand hygiene technique  - Identify and instruct in appropriate isolation precautions for identified infection/condition  Outcome: Progressing     Problem: DISCHARGE PLANNING  Goal: Discharge to home or other facility with appropriate resources  Description: INTERVENTIONS:  - Identify barriers to discharge w/patient and caregiver  - Arrange for needed discharge resources and transportation as appropriate  - Identify discharge learning needs (meds, wound care, etc )  - Arrange for interpretive services to assist at discharge as needed  - Refer to Case Management Department for coordinating discharge planning if the patient needs post-hospital services based on physician/advanced practitioner order or complex needs related to functional status, cognitive ability, or social support system  Outcome: Progressing     Problem: Knowledge Deficit  Goal: Patient/family/caregiver demonstrates understanding of disease process, treatment plan, medications, and discharge instructions  Description: Complete learning assessment and assess knowledge base    Interventions:  - Provide teaching at level of understanding  - Provide teaching via preferred learning methods  Outcome: Progressing     Problem: GASTROINTESTINAL - ADULT  Goal: Minimal or absence of nausea and/or vomiting  Description: INTERVENTIONS:  - Administer IV fluids if ordered to ensure adequate hydration  - Maintain NPO status until nausea and vomiting are resolved  - Nasogastric tube if ordered  - Administer ordered antiemetic medications as needed  - Provide nonpharmacologic comfort measures as appropriate  - Advance diet as tolerated, if ordered  - Consider nutrition services referral to assist patient with adequate nutrition and appropriate food choices  Outcome: Progressing     Problem: GENITOURINARY - ADULT  Goal: Maintains or returns to baseline urinary function  Description: INTERVENTIONS:  - Assess urinary function  - Encourage oral fluids to ensure adequate hydration if ordered  - Administer IV fluids as ordered to ensure adequate hydration  - Administer ordered medications as needed  - Offer frequent toileting  - Follow urinary retention protocol if ordered  Outcome: Progressing     Problem: METABOLIC, FLUID AND ELECTROLYTES - ADULT  Goal: Electrolytes maintained within normal limits  Description: INTERVENTIONS:  - Monitor labs and assess patient for signs and symptoms of electrolyte imbalances  - Administer electrolyte replacement as ordered  - Monitor response to electrolyte replacements, including repeat lab results as appropriate  - Instruct patient on fluid and nutrition as appropriate  Outcome: Progressing  Goal: Fluid balance maintained  Description: INTERVENTIONS:  - Monitor labs   - Monitor I/O and WT  - Instruct patient on fluid and nutrition as appropriate  - Assess for signs & symptoms of volume excess or deficit  Outcome: Progressing     Problem: SKIN/TISSUE INTEGRITY - ADULT  Goal: Skin integrity remains intact  Description: INTERVENTIONS  - Identify patients at risk for skin breakdown  - Assess and monitor skin integrity  - Assess and monitor nutrition and hydration status  - Monitor labs (i e  albumin)  - Assess for incontinence   - Turn and reposition patient  - Assist with mobility/ambulation  - Relieve pressure over bony prominences  - Avoid friction and shearing  - Provide appropriate hygiene as needed including keeping skin clean and dry  - Evaluate need for skin moisturizer/barrier cream  - Collaborate with interdisciplinary team (i e  Nutrition, Rehabilitation, etc )   - Patient/family teaching  Outcome: Progressing

## 2020-08-09 VITALS
BODY MASS INDEX: 30.6 KG/M2 | HEART RATE: 75 BPM | WEIGHT: 183.64 LBS | TEMPERATURE: 98.2 F | HEIGHT: 65 IN | RESPIRATION RATE: 18 BRPM | SYSTOLIC BLOOD PRESSURE: 125 MMHG | OXYGEN SATURATION: 95 % | DIASTOLIC BLOOD PRESSURE: 58 MMHG

## 2020-08-09 LAB
ANION GAP SERPL CALCULATED.3IONS-SCNC: 8 MMOL/L (ref 4–13)
BACTERIA UR CULT: ABNORMAL
BASOPHILS # BLD AUTO: 0.08 THOUSANDS/ΜL (ref 0–0.1)
BASOPHILS NFR BLD AUTO: 1 % (ref 0–1)
BUN SERPL-MCNC: 7 MG/DL (ref 5–25)
CALCIUM SERPL-MCNC: 8.8 MG/DL (ref 8.3–10.1)
CHLORIDE SERPL-SCNC: 101 MMOL/L (ref 100–108)
CO2 SERPL-SCNC: 26 MMOL/L (ref 21–32)
CREAT SERPL-MCNC: 0.96 MG/DL (ref 0.6–1.3)
EOSINOPHIL # BLD AUTO: 0.27 THOUSAND/ΜL (ref 0–0.61)
EOSINOPHIL NFR BLD AUTO: 3 % (ref 0–6)
ERYTHROCYTE [DISTWIDTH] IN BLOOD BY AUTOMATED COUNT: 13.1 % (ref 11.6–15.1)
GFR SERPL CREATININE-BSD FRML MDRD: 79 ML/MIN/1.73SQ M
GLUCOSE SERPL-MCNC: 174 MG/DL (ref 65–140)
GLUCOSE SERPL-MCNC: 186 MG/DL (ref 65–140)
GLUCOSE SERPL-MCNC: 206 MG/DL (ref 65–140)
HCT VFR BLD AUTO: 44.2 % (ref 36.5–49.3)
HGB BLD-MCNC: 14.7 G/DL (ref 12–17)
IMM GRANULOCYTES # BLD AUTO: 0.03 THOUSAND/UL (ref 0–0.2)
IMM GRANULOCYTES NFR BLD AUTO: 0 % (ref 0–2)
LYMPHOCYTES # BLD AUTO: 2.16 THOUSANDS/ΜL (ref 0.6–4.47)
LYMPHOCYTES NFR BLD AUTO: 23 % (ref 14–44)
MCH RBC QN AUTO: 29.3 PG (ref 26.8–34.3)
MCHC RBC AUTO-ENTMCNC: 33.3 G/DL (ref 31.4–37.4)
MCV RBC AUTO: 88 FL (ref 82–98)
MONOCYTES # BLD AUTO: 1.1 THOUSAND/ΜL (ref 0.17–1.22)
MONOCYTES NFR BLD AUTO: 12 % (ref 4–12)
NEUTROPHILS # BLD AUTO: 5.9 THOUSANDS/ΜL (ref 1.85–7.62)
NEUTS SEG NFR BLD AUTO: 61 % (ref 43–75)
NRBC BLD AUTO-RTO: 0 /100 WBCS
PLATELET # BLD AUTO: 247 THOUSANDS/UL (ref 149–390)
PMV BLD AUTO: 8.1 FL (ref 8.9–12.7)
POTASSIUM SERPL-SCNC: 3.7 MMOL/L (ref 3.5–5.3)
RBC # BLD AUTO: 5.01 MILLION/UL (ref 3.88–5.62)
SODIUM SERPL-SCNC: 135 MMOL/L (ref 136–145)
WBC # BLD AUTO: 9.54 THOUSAND/UL (ref 4.31–10.16)

## 2020-08-09 PROCEDURE — 85025 COMPLETE CBC W/AUTO DIFF WBC: CPT | Performed by: INTERNAL MEDICINE

## 2020-08-09 PROCEDURE — 80048 BASIC METABOLIC PNL TOTAL CA: CPT | Performed by: INTERNAL MEDICINE

## 2020-08-09 PROCEDURE — 82948 REAGENT STRIP/BLOOD GLUCOSE: CPT

## 2020-08-09 PROCEDURE — 99239 HOSP IP/OBS DSCHRG MGMT >30: CPT | Performed by: INTERNAL MEDICINE

## 2020-08-09 RX ORDER — CIPROFLOXACIN 500 MG/1
500 TABLET, FILM COATED ORAL EVERY 12 HOURS SCHEDULED
Qty: 10 TABLET | Refills: 0 | Status: SHIPPED | OUTPATIENT
Start: 2020-08-09 | End: 2020-08-14

## 2020-08-09 RX ADMIN — FUROSEMIDE 40 MG: 40 TABLET ORAL at 09:15

## 2020-08-09 RX ADMIN — GABAPENTIN 300 MG: 300 CAPSULE ORAL at 09:16

## 2020-08-09 RX ADMIN — CIPROFLOXACIN 400 MG: 2 INJECTION, SOLUTION INTRAVENOUS at 13:47

## 2020-08-09 RX ADMIN — AMLODIPINE BESYLATE 10 MG: 10 TABLET ORAL at 09:13

## 2020-08-09 RX ADMIN — INSULIN LISPRO 1 UNITS: 100 INJECTION, SOLUTION INTRAVENOUS; SUBCUTANEOUS at 09:20

## 2020-08-09 RX ADMIN — CIPROFLOXACIN 400 MG: 2 INJECTION, SOLUTION INTRAVENOUS at 02:06

## 2020-08-09 RX ADMIN — CLOPIDOGREL BISULFATE 75 MG: 75 TABLET ORAL at 09:14

## 2020-08-09 RX ADMIN — LOSARTAN POTASSIUM 50 MG: 50 TABLET, FILM COATED ORAL at 09:16

## 2020-08-09 RX ADMIN — GABAPENTIN 300 MG: 300 CAPSULE ORAL at 13:52

## 2020-08-09 RX ADMIN — ENOXAPARIN SODIUM 40 MG: 40 INJECTION SUBCUTANEOUS at 09:18

## 2020-08-09 RX ADMIN — PANTOPRAZOLE SODIUM 40 MG: 40 TABLET, DELAYED RELEASE ORAL at 06:34

## 2020-08-09 RX ADMIN — INSULIN LISPRO 2 UNITS: 100 INJECTION, SOLUTION INTRAVENOUS; SUBCUTANEOUS at 11:43

## 2020-08-09 RX ADMIN — SENNOSIDES 8.6 MG: 8.6 TABLET ORAL at 09:15

## 2020-08-09 RX ADMIN — PROPRANOLOL HYDROCHLORIDE 60 MG: 60 CAPSULE, EXTENDED RELEASE ORAL at 09:16

## 2020-08-09 NOTE — ASSESSMENT & PLAN NOTE
Incidental finding on CT  Pt states he has a sister who  from pancreatic cancer  Will need MRI as an outpatient  Discussed this with patient and showed understanding

## 2020-08-09 NOTE — SOCIAL WORK
Pt stable to return home today  BETO spoke with nursing who checked with pt that a wcv transport will be ok  BETO called the on call nurse at [facility] to see if we can get a transport arranged  They have arranged a 4PM  time with Corine PÉREZ called pt's Rn to provide update

## 2020-08-09 NOTE — PLAN OF CARE
Problem: Potential for Falls  Goal: Patient will remain free of falls  Description: INTERVENTIONS:  - Assess patient frequently for physical needs  -  Identify cognitive and physical deficits and behaviors that affect risk of falls  -  [place] fall precautions as indicated by assessment   - Educate patient/family on patient safety including physical limitations  - Instruct patient to call for assistance with activity based on assessment  - Modify environment to reduce risk of injury  - Consider OT/PT consult to assist with strengthening/mobility  Outcome: Progressing     Problem: Prexisting or High Potential for Compromised Skin Integrity  Goal: Skin integrity is maintained or improved  Description: INTERVENTIONS:  - Identify patients at risk for skin breakdown  - Assess and monitor skin integrity  - Assess and monitor nutrition and hydration status  - Monitor labs   - Assess for incontinence   - Turn and reposition patient  - Assist with mobility/ambulation  - Relieve pressure over bony prominences  - Avoid friction and shearing  - Provide appropriate hygiene as needed including keeping skin clean and dry  - Evaluate need for skin moisturizer/barrier cream  - Collaborate with interdisciplinary team   - Patient/family teaching  - Consider wound care consult   Outcome: Progressing     Problem: Nutrition/Hydration-ADULT  Goal: Nutrient/Hydration intake appropriate for improving, restoring or maintaining nutritional needs  Description: Monitor and assess patient's nutrition/hydration status for malnutrition  Collaborate with interdisciplinary team and initiate plan and interventions as ordered  Monitor patient's weight and dietary intake as ordered or per policy  Utilize nutrition screening tool and intervene as necessary  Determine patient's food preferences and provide high-protein, high-caloric foods as appropriate       INTERVENTIONS:  - Monitor oral intake, urinary output, labs, and treatment plans  - Assess nutrition and hydration status and recommend course of action  - Evaluate amount of meals eaten  - Assist patient with eating if necessary   - Allow adequate time for meals  - Recommend/ encourage appropriate diets, oral nutritional supplements, and vitamin/mineral supplements  - Order, calculate, and assess calorie counts as needed  - Recommend, monitor, and adjust tube feedings and TPN/PPN based on assessed needs  - Assess need for intravenous fluids  - Provide specific nutrition/hydration education as appropriate  - Include patient/family/caregiver in decisions related to nutrition  Outcome: Progressing     Problem: PAIN - ADULT  Goal: Verbalizes/displays adequate comfort level or baseline comfort level  Description: Interventions:  - Encourage patient to monitor pain and request assistance  - Assess pain using appropriate pain scale  - Administer analgesics based on type and severity of pain and evaluate response  - Implement non-pharmacological measures as appropriate and evaluate response  - Consider cultural and social influences on pain and pain management  - Notify physician/advanced practitioner if interventions unsuccessful or patient reports new pain  Outcome: Progressing     Problem: INFECTION - ADULT  Goal: Absence or prevention of progression during hospitalization  Description: INTERVENTIONS:  - Assess and monitor for signs and symptoms of infection  - Monitor lab/diagnostic results  - Monitor all insertion sites, i e  indwelling lines, tubes, and drains  - Monitor endotracheal if appropriate and nasal secretions for changes in amount and color  - [place] appropriate cooling/warming therapies per order  - Administer medications as ordered  - Instruct and encourage patient and family to use good hand hygiene technique  - Identify and instruct in appropriate isolation precautions for identified infection/condition  Outcome: Progressing     Problem: DISCHARGE PLANNING  Goal: Discharge to home or other facility with appropriate resources  Description: INTERVENTIONS:  - Identify barriers to discharge w/patient and caregiver  - Arrange for needed discharge resources and transportation as appropriate  - Identify discharge learning needs (meds, wound care, etc )  - Arrange for interpretive services to assist at discharge as needed  - Refer to Case Management Department for coordinating discharge planning if the patient needs post-hospital services based on physician/advanced practitioner order or complex needs related to functional status, cognitive ability, or social support system  Outcome: Progressing     Problem: Knowledge Deficit  Goal: Patient/family/caregiver demonstrates understanding of disease process, treatment plan, medications, and discharge instructions  Description: Complete learning assessment and assess knowledge base    Interventions:  - Provide teaching at level of understanding  - Provide teaching via preferred learning methods  Outcome: Progressing     Problem: GASTROINTESTINAL - ADULT  Goal: Minimal or absence of nausea and/or vomiting  Description: INTERVENTIONS:  - Administer IV fluids if ordered to ensure adequate hydration  - Maintain NPO status until nausea and vomiting are resolved  - Nasogastric tube if ordered  - Administer ordered antiemetic medications as needed  - Provide nonpharmacologic comfort measures as appropriate  - Advance diet as tolerated, if ordered  - Consider nutrition services referral to assist patient with adequate nutrition and appropriate food choices  Outcome: Progressing     Problem: GENITOURINARY - ADULT  Goal: Maintains or returns to baseline urinary function  Description: INTERVENTIONS:  - Assess urinary function  - Encourage oral fluids to ensure adequate hydration if ordered  - Administer IV fluids as ordered to ensure adequate hydration  - Administer ordered medications as needed  - Offer frequent toileting  - Follow urinary retention protocol if ordered  Outcome: Progressing     Problem: METABOLIC, FLUID AND ELECTROLYTES - ADULT  Goal: Electrolytes maintained within normal limits  Description: INTERVENTIONS:  - Monitor labs and assess patient for signs and symptoms of electrolyte imbalances  - Administer electrolyte replacement as ordered  - Monitor response to electrolyte replacements, including repeat lab results as appropriate  - Instruct patient on fluid and nutrition as appropriate  Outcome: Progressing  Goal: Fluid balance maintained  Description: INTERVENTIONS:  - Monitor labs   - Monitor I/O and WT  - Instruct patient on fluid and nutrition as appropriate  - Assess for signs & symptoms of volume excess or deficit  Outcome: Progressing     Problem: SKIN/TISSUE INTEGRITY - ADULT  Goal: Skin integrity remains intact  Description: INTERVENTIONS  - Identify patients at risk for skin breakdown  - Assess and monitor skin integrity  - Assess and monitor nutrition and hydration status  - Monitor labs (i e  albumin)  - Assess for incontinence   - Turn and reposition patient  - Assist with mobility/ambulation  - Relieve pressure over bony prominences  - Avoid friction and shearing  - Provide appropriate hygiene as needed including keeping skin clean and dry  - Evaluate need for skin moisturizer/barrier cream  - Collaborate with interdisciplinary team (i e  Nutrition, Rehabilitation, etc )   - Patient/family teaching  Outcome: Progressing

## 2020-08-09 NOTE — ASSESSMENT & PLAN NOTE
Admitted for UTI  Patient with hx of neurogenic bladder due to MS  Started on ciprofloxacin and showed significant improvement  Continue ciprofloxacin based on culture results  Had 1 episode of high-grade fever last night however clinically improving  Continue ciprofloxacin for 5 more days  Medically stable for discharge

## 2020-08-09 NOTE — ASSESSMENT & PLAN NOTE
Lab Results   Component Value Date    HGBA1C 9 6 (H) 08/07/2020       Recent Labs     08/08/20  1102 08/08/20  1608 08/08/20  2132 08/09/20  0729   POCGLU 221* 234* 221* 174*       Order accuchecks with sliding scale  (P) 202 5

## 2020-08-09 NOTE — DISCHARGE SUMMARY
Discharge- Mercy Petties 1950, 69 y o  male MRN: [id number]    Unit/Bed#: E5 -01 Encounter: [id number]    Primary Care Provider: Brock Horn DO   Date and time admitted to hospital: 2020 11:33 PM    * Urinary tract infection  Assessment & Plan  Admitted for UTI  Patient with hx of neurogenic bladder due to MS  Started on ciprofloxacin and showed significant improvement  Continue ciprofloxacin based on culture results  A febrile for more than 24 hours  Continue ciprofloxacin for 5 more days  Medically stable for discharge    Pancreatic mass  Assessment & Plan  Incidental finding on CT  Pt states he has a sister who  from pancreatic cancer  MRI abdomen as an outpatient  Discussed this with patient and showed understanding  History of CVA (cerebrovascular accident)  Assessment & Plan  Hx CVA 10/18    Diabetes mellitus [facility])  Assessment & Plan  Lab Results   Component Value Date    HGBA1C 9 6 (H) 2020     Recent Labs     20  1102 20  1608 20  2132 20  0729   POCGLU 221* 234* 221* 174*     Order accuchecks with sliding scale  (P) 202 5    Multiple sclerosis (HCC)  Assessment & Plan  Recent worsening of leg weakness, now wheelchair bound    Discharging Physician / Practitioner: Tara Villa MD  PCP: Brock Horn DO  Admission Date:   Admission Orders (From admission, onward)     Ordered        20 0251  Inpatient Admission (expected length of stay for this patient Order details is greater than two midnights)  Once                   Discharge Date: 20    Disposition:      Other: Home    For Discharges to [facility]:   · Not Applicable to this Patient - Not Applicable to this Patient    Reason for Admission:  UTI    Discharge Diagnoses:     Please see assessment and plan section above for further details regarding discharge diagnoses       Resolved Problems  Date Reviewed: 2019    None          [place]ultations [place] [facility] [place]  Damita Osler IP CONSULT TO CASE MANAGEMENT     Procedures Performed:   * No surgery found *      Ct Abdomen Pelvis With Contrast    Result Date: 8/7/2020  Narrative: CT ABDOMEN AND PELVIS WITH IV CONTRAST INDICATION:   Abdominal pain, fever Abdominal pain, acute, nonlocalized abd pain nausea   r/o obstruction, constipation, cystitis  Abdominal pain, nausea, generalized weakness  Fever, chills  History of neurogenic bladder, kidney stones, bladder stones, suprapubic catheter  History of multiple sclerosis, diabetes, hypertension  Prior appendectomy  The patient tested negative for Covid 19 on May 15, 2020  COMPARISON:  Ultrasound dated January 10, 2020  TECHNIQUE:  CT examination of the abdomen and pelvis was performed  Axial, sagittal, and coronal 2D reformatted images were created from the source data and submitted for interpretation  Radiation dose length product (DLP) for this visit:  427 mGy-cm   This examination, like all CT scans performed in the [facility], was performed utilizing techniques to minimize radiation dose exposure, including the use of iterative reconstruction and automated exposure control  IV Contrast:  100 mL of iohexol (OMNIPAQUE) Enteric Contrast:  Enteric contrast was not administered  FINDINGS: ABDOMEN LOWER CHEST:  No clinically significant abnormality identified in the visualized lower chest  LIVER/BILIARY TREE:  There is mild fatty infiltration of the liver  GALLBLADDER:  No calcified gallstones  No pericholecystic inflammatory change  SPLEEN:  Unremarkable  PANCREAS:  There is a questionable hypodensity within the uncinate process of the pancreas, measuring approximately 11 mm (series 2 image 33)  A mass in this area should be excluded  Follow-up is recommended  Nonemergent outpatient MRI is recommended for further characterization, if there are no contraindications  ADRENAL GLANDS:  Unremarkable  KIDNEYS/URETERS:  Small bilateral renal cysts  There is some urothelial enhancement involving the right ureter  Clinical and laboratory correlation regarding the possibility of urinary tract infection/pyelonephritis is recommended  There is no hydronephrosis bilaterally  Renovascular calcifications are present  STOMACH AND BOWEL:  There is a moderate amount of stool in the colon, most pronounced in the rectosigmoid, transverse colon and right colon, suggesting a degree of constipation  Stool in the rectum measures up to 5 7 cm transverse dimension  There is some bowel wall thickening of the rectum and there is some infiltration of the adjacent fat, suggesting stercoral colitis  There is no small bowel obstruction  APPENDIX:  No findings to suggest appendicitis  ABDOMINOPELVIC CAVITY:  As described above  No pneumoperitoneum  VESSELS:  There is atherosclerosis  There is no abdominal aortic aneurysm  PELVIS REPRODUCTIVE ORGANS:  Unremarkable for patient's age  URINARY BLADDER:  The urinary bladder is decompressed by a suprapubic catheter, however, the wall of the urinary bladder appears thickened and somewhat edematous  This suggests urinary tract infection/cystitis  Clinical and laboratory correlation is recommended  Follow-up is recommended  ABDOMINAL WALL/INGUINAL REGIONS:  There are small bilateral fat-containing inguinal hernias  OSSEOUS STRUCTURES:  No acute fracture or destructive osseous lesion  Impression: The urinary bladder is decompressed by a suprapubic catheter  The wall of the urinary bladder appears thickened and somewhat edematous  Additionally, there is some urothelial enhancement involving the right ureter  These findings suggest urinary tract  infection/cystitis and possibly pyelonephritis  Clinical correlation, laboratory correlation and follow-up is recommended  Constipation with findings suggesting stercoral colitis, as described above  Please see discussion  No evidence of small bowel obstruction   There is a questionable hypodensity within the uncinate process of the pancreas, measuring approximately 11 mm  A mass in this area should be excluded  Follow-up is recommended  Nonemergent outpatient MRI is recommended for further characterization, if there are no contraindications  Mild fatty infiltration of the liver  Other findings as described above, please see discussion  The study was marked in [facility] for immediate notification  Workstation performed: [id number]        Medication Adjustments and Discharge Medications:  · Summary of Medication Adjustments made as a result of this hospitalization:  Ciprofloxacin for 5 more days  · Medication Dosing Tapers - Please refer to Discharge Medication List for details on any medication dosing tapers (if applicable to patient)  · Discharge Medication List: See after visit summary for reconciled discharge medications  Wound Care Recommendations:  When applicable, please see wound care section of After Visit Summary  Diet Recommendations at Discharge:  Diet -        Diet Orders   (From admission, onward)             Start     Ordered    08/07/20 0252  Diet Mikey/CHO Controlled; Consistent Carbohydrate Diet Level 2 (5 carb servings/75 grams CHO/meal)  Diet effective now     Question Answer Comment   Diet Type Mikey/CHO Controlled    Mikey/CHO Controlled Consistent Carbohydrate Diet Level 2 (5 carb servings/75 grams CHO/meal)    RD to adjust diet per protocol? Yes        08/07/20 0255                  Incidental Findings:   · Pancreatic mass     Test Results Pending at Discharge (will require follow up): · Repeat CT abdomen pelvis with IV contrast in 3 weeks after discharge         Hospital Course:     Kell Hernandez is a 69 y o  male patient who originally presented to the hospital on 8/6/2020 due to UTI  Treated successfully with ciprofloxacin and showed significant improvement  Urine culture reviewed and ciprofloxacin for 5 days continued on discharge    Patient with indwelling suprapubic catheter which was changed inpatient  Patient advised to follow-up with urology as an outpatient  There was an incidental finding of pancreatic lesion suspected for mass, MRI stay recommended however patient has metallic implant  Will do Mri abdomen in 3 weeks after discharge  Patient advised to follow-up with GI and primary care physician as an outpatient  All other home medications to continue  Condition at Discharge: stable     Discharge Day Visit / Exam:     Subjective:  No complaints  Vitals: Blood Pressure: 138/72 (08/09/20 0723)  Pulse: 77 (08/09/20 0723)  Temperature: 97 7 °F (36 5 °C) (08/09/20 0723)  Temp Source: Temporal (08/09/20 0723)  Respirations: 18 (08/09/20 0723)  Height: 5' 5" (165 1 cm) (08/07/20 1506)  Weight - Scale: 83 3 kg (183 lb 10 3 oz) (08/07/20 1506)  SpO2: 95 % (08/09/20 0723)  Exam:     General appearance: alert, appears stated age and cooperative  Head: Normocephalic, without obvious abnormality, atraumatic  Lungs: clear to auscultation bilaterally  Heart: regular rate and rhythm  Abdomen: soft, non-tender, positive bowel sounds   Back: negative  Extremities: extremities atraumatic, no cyanosis or edema  Neurologic: Grossly normal      Discharge instructions/Information to patient and family:   See after visit summary section titled Discharge Instructions for information provided to patient and family  Planned Readmission:  No      Discharge Statement:  I spent 35 minutes discharging the patient  This time was spent on the day of discharge  I had direct contact with the patient on the day of discharge  Greater than 50% of the total time was spent examining patient, answering all patient questions, arranging and discussing plan of care with patient as well as directly providing post-discharge instructions  Additional time then spent on discharge activities      ** Please Note: This note has been constructed using a voice recognition system **

## 2020-08-12 LAB
BACTERIA BLD CULT: NORMAL
BACTERIA BLD CULT: NORMAL

## 2020-08-20 ENCOUNTER — TELEPHONE (OUTPATIENT)
Dept: UROLOGY | Facility: MEDICAL CENTER | Age: 70
End: 2020-08-20

## 2020-08-20 NOTE — TELEPHONE ENCOUNTER
Called and left message on [employer] VM  Pt should have dela cruz change by home nurse on 9/7/2020   Will schedule FU appt with AP in September

## 2020-08-20 NOTE — TELEPHONE ENCOUNTER
Patient S/P ER with UTI  Received call from [phone] [facility] with [employer]  This is in regard to need for follow up from the ER  Payton with [employer] is both his nurse and insurance   Please give Payton 2 days notice for the appointment    She can be reached at [phone] X [phone]   Thank you

## 2020-09-02 ENCOUNTER — OFFICE VISIT (OUTPATIENT)
Dept: GASTROENTEROLOGY | Facility: CLINIC | Age: 70
End: 2020-09-02
Payer: MEDICARE

## 2020-09-02 VITALS
DIASTOLIC BLOOD PRESSURE: 81 MMHG | HEART RATE: 89 BPM | TEMPERATURE: 97 F | WEIGHT: 198 LBS | HEIGHT: 63 IN | SYSTOLIC BLOOD PRESSURE: 151 MMHG | BODY MASS INDEX: 35.08 KG/M2

## 2020-09-02 DIAGNOSIS — K86.89 PANCREATIC MASS: ICD-10-CM

## 2020-09-02 PROCEDURE — 99204 OFFICE O/P NEW MOD 45 MIN: CPT | Performed by: INTERNAL MEDICINE

## 2020-09-02 NOTE — PROGRESS NOTES
[facility] - Outpatient Consultation  Nsia Rosa 69 y o  male MRN: [id number]  Encounter: [id number]    ASSESSMENT AND PLAN:    Nisa Rosa is a 69 y o  old male with PMH: DM2, sleep apnea, GERD who presents for incidental pancreatic hypodensity  #Pancreatic Hypodensity   Patient noted to have pancreatic hypodensity in the uncinate process  No evidence of jaundice or biliary obstruction noted  Patient with noted family hx of pancreatitic cancer, with sister who passed from it  Patient to get MR abdomen and will order CA 19-9    -MR abdomen ordered, patient to schedule  -CA 19-9 ordered    #GERD  -continue protonix 40mg BID    #Colon Cancer screening  Patient underwent colonoscopy in 2017 found to have transverse adenoma 8mm  Plan for repeat colonoscopy in 2022      ______________________________________________________________________    HPI:    Patient reffered by Bailee Garvey DO  Patient recently admitted to the hospital in early August for urinary tract infection  Treated with Cipro for Floxin at that time  On CT scan was noted to have incidental pancreatic mass/lesion  Denies any jaundice, weight loss  Does endorse some gas but no dysphagia  Patient denies any hematemesis, melena, or hematochezia  Does not endorse any weight loss, recent N/V/F/C  Denies any change in bowel habits, no new constipation or diarrhea  No anemia  CT scan showed - There is a questionable hypodensity within the uncinate process of the pancreas, measuring approximately 11 mm  A mass in this area should be excluded  Follow-up was recommended  Family history:  Sister with hx of pancreatic cancer    Last colonoscopy: 2017 - transverse polyp    REVIEW OF SYSTEMS:    CONSTITUTIONAL: Denies any fever, chills, rigors, and weight loss  HEENT: No earache or tinnitus  Denies hearing loss or visual disturbances  CARDIOVASCULAR: No chest pain or palpitations     RESPIRATORY: Denies any cough, hemoptysis, shortness of breath or dyspnea on exertion  GASTROINTESTINAL: As noted in the History of Present Illness  GENITOURINARY: No problems with urination  Denies any hematuria or dysuria  NEUROLOGIC: No dizziness or vertigo, denies headaches  MUSCULOSKELETAL: Denies any muscle or joint pain  SKIN: Denies skin rashes or itching  ENDOCRINE: Denies excessive thirst  Denies intolerance to heat or cold  PSYCHOSOCIAL: Denies depression or anxiety  Denies any recent memory loss       Historical Information   Past Medical History:   Diagnosis Date    Acute laryngitis     Acute nonsuppurative otitis media, unspecified laterality     Arm weakness     Arthritis     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HCC)     Bladder infection     Bronchitis     Constipation     Cough     Diabetes mellitus (HCC)     Dizziness     Dysfunction of eustachian tube     Erectile dysfunction of non-organic origin     Fatigue     Glaucoma     Hiatal hernia     Hypertension     Imbalance     Leg muscle spasm     [facility] sclerosis[facility])     Nephrolithiasis     No natural teeth     Sinus pain     Spinal stenosis     Strain of thoracic region     Stroke ([address]     Suprapubic catheter ([address] )      Past Surgical History:   Procedure Laterality Date    APPENDECTOMY      BRAIN SURGERY      Coil placed in aneurysm    CYSTOSCOPY      CYSTOSCOPY      CYSTOSCOPY  06/11/2018    EYE SURGERY      transscleral cyclophotocoagulation noncontact YAG laser    MYRINGOTOMY      with ventilation tube insertion    ND REMOVE BLADDER STONE,<2 5 CM N/A 5/7/2019    Procedure: CYSTOSCOPY, holmium laser litholapaxy of bladder stones, EXCHANGE OF SP TUBE;  Surgeon: Azeem Andino MD;  [facility];  Service: Urology    SUPRAPUBIC CATHETER INSERTION       Social History   Social History     Substance and Sexual Activity   Alcohol Use Not Currently     Social History     Substance and Sexual Activity   Drug Use No Social History     Tobacco Use   Smoking Status Former Smoker    Types: Cigarettes   Smokeless Tobacco Never Used   Tobacco Comment    smoked for 15-20 years, stopped about 25 years ago     Family History   Problem Relation Age of Onset    Heart attack Mother     Stroke Mother     Heart attack Father     Anuerysm Father         In Stomach        Meds/Allergies       Current Outpatient Medications:     albuterol (PROVENTIL HFA,VENTOLIN HFA) 90 mcg/act inhaler    ALPRAZolam (XANAX) 0 25 mg tablet    amLODIPine (NORVASC) 10 mg tablet    atorvastatin (LIPITOR) 20 mg tablet    baclofen 10 mg tablet    brimonidine-timolol (COMBIGAN) 0 2-0 5 %    clopidogrel (PLAVIX) 75 mg tablet    dextromethorphan-guaifenesin (MUCINEX DM)  MG per 12 hr tablet    furosemide (LASIX) 20 mg tablet    gabapentin (NEURONTIN) 300 mg capsule    linaGLIPtin 5 MG TABS    losartan (COZAAR) 50 mg tablet    metFORMIN (GLUCOPHAGE) 500 mg tablet    pantoprazole (PROTONIX) 40 mg tablet    potassium chloride (K-DUR,KLOR-CON) 10 mEq tablet    propranolol (INDERAL LA) 60 mg 24 hr capsule    sitaGLIPtin (JANUVIA) 100 mg tablet    sucralfate (CARAFATE) 1 g tablet  Allergies   Allergen Reactions    Cephalexin Rash       Objective     There were no vitals taken for this visit  There is no height or weight on file to calculate BMI  PHYSICAL EXAM:      General Appearance:   Elder male in NAD in wheelchair who is alert, cooperative, no distress   HEENT:   Normocephalic, atraumatic, anicteric   Neck:  Supple, symmetrical, trachea midline   Lungs:   Clear to auscultation bilaterally; no rales, rhonchi or wheezing; respirations unlabored    Heart:   Regular rate and rhythm; no murmur, rub, or gallop     Abdomen:   Soft, NT on palpation   Genitalia:   Deferred    Rectal:   Deferred    Extremities:  No cyanosis, clubbing or edema    Pulses:  2+ and symmetric    Skin:  No jaundice, rashes, or lesions    Lymph nodes:  No palpable cervical lymphadenopathy        Lab Results:   No visits with results within 1 Day(s) from this visit     Latest known visit with results is:   Admission on 08/06/2020, Discharged on 08/09/2020   Component Date Value    WBC 08/07/2020 13 50*    RBC 08/07/2020 5 74*    Hemoglobin 08/07/2020 16 7     Hematocrit 08/07/2020 50 0*    MCV 08/07/2020 87     MCH 08/07/2020 29 1     MCHC 08/07/2020 33 4     RDW 08/07/2020 13 0     MPV 08/07/2020 8 3*    Platelets 15/16/0885 361     nRBC 08/07/2020 0     Neutrophils Relative 08/07/2020 74     Immat GRANS % 08/07/2020 0     Lymphocytes Relative 08/07/2020 15     Monocytes Relative 08/07/2020 8     Eosinophils Relative 08/07/2020 2     Basophils Relative 08/07/2020 1     Neutrophils Absolute 08/07/2020 10 03*    Immature Grans Absolute 08/07/2020 0 05     Lymphocytes Absolute 08/07/2020 2 05     Monocytes Absolute 08/07/2020 1 04     Eosinophils Absolute 08/07/2020 0 21     Basophils Absolute 08/07/2020 0 12*    Color, UA 08/07/2020 Yellow     Clarity, UA 08/07/2020 Cloudy     Specific Gravity, UA 08/07/2020 1 020     pH, UA 08/07/2020 7 5     Leukocytes, UA 08/07/2020 Moderate*    Nitrite, UA 08/07/2020 Positive*    Protein, UA 08/07/2020 30 (1+)*    Glucose, UA 08/07/2020 500 (1/2%)*    Ketones, UA 08/07/2020 40 (2+)*    Urobilinogen, UA 08/07/2020 0 2     Bilirubin, UA 08/07/2020 Negative     Blood, UA 08/07/2020 Small*    Sodium 08/07/2020 133*    Potassium 08/07/2020 3 9     Chloride 08/07/2020 97*    CO2 08/07/2020 26     ANION GAP 08/07/2020 10     BUN 08/07/2020 10     Creatinine 08/07/2020 0 92     Glucose 08/07/2020 211*    Calcium 08/07/2020 9 5     AST 08/07/2020 12     ALT 08/07/2020 21     Alkaline Phosphatase 08/07/2020 106     Total Protein 08/07/2020 8 1     Albumin 08/07/2020 3 4*    Total Bilirubin 08/07/2020 0 54     eGFR 08/07/2020 83     Magnesium 08/07/2020 1 7     LACTIC ACID 08/07/2020 1 3     Total CK 08/07/2020 51     Troponin I 08/07/2020 <0 02     Blood Culture 08/06/2020 No Growth After 5 Days   Blood Culture 08/07/2020 No Growth After 5 Days       Protime 08/07/2020 12 1     INR 08/07/2020 0 92     PTT 08/07/2020 27     RBC, UA 08/07/2020 Field obscured, unable to enumerate*    WBC, UA 08/07/2020 Innumerable*    Epithelial Cells 08/07/2020 Field obscured, unable to enumerate*    Bacteria, UA 08/07/2020 Field obscured, unable to enumerate*    Urine Culture 08/07/2020 >100,000 cfu/ml Citrobacter freundii*    Urine Culture 08/07/2020 >100,000 cfu/ml Enterobacter aerogenes*    Urine Culture 08/07/2020 <10,000 cfu/ml Enterococcus species*    Hemoglobin A1C 08/07/2020 9 6*    EAG 08/07/2020 229     Sodium 08/07/2020 133*    Potassium 08/07/2020 4 1     Chloride 08/07/2020 98*    CO2 08/07/2020 25     ANION GAP 08/07/2020 10     BUN 08/07/2020 9     Creatinine 08/07/2020 1 07     Glucose 08/07/2020 197*    Calcium 08/07/2020 9 1     eGFR 08/07/2020 69     WBC 08/07/2020 12 18*    RBC 08/07/2020 5 25     Hemoglobin 08/07/2020 15 3     Hematocrit 08/07/2020 45 9     MCV 08/07/2020 87     MCH 08/07/2020 29 1     MCHC 08/07/2020 33 3     RDW 08/07/2020 13 0     Platelets 32/24/2411 314     MPV 08/07/2020 8 2*    Ventricular Rate 08/06/2020 117     Atrial Rate 08/06/2020 117     [place] Interval 08/06/2020 156     QRSD Interval 08/06/2020 90     QT Interval 08/06/2020 328     QTC Interval 08/06/2020 457     P Axis 08/06/2020 71     QRS Axis 08/06/2020 66     T Wave [place] 08/06/2020 55     POC Glucose 08/07/2020 183*    POC Glucose 08/07/2020 254*    POC Glucose 08/07/2020 198*    POC Glucose 08/07/2020 159*    POC Glucose 08/07/2020 186*    WBC 08/08/2020 13 89*    RBC 08/08/2020 5 04     Hemoglobin 08/08/2020 14 8     Hematocrit 08/08/2020 44 0     MCV 08/08/2020 87     MCH 08/08/2020 29 4     MCHC 08/08/2020 33 6     RDW 08/08/2020 13 2     MPV 08/08/2020 7 9*  Platelets 15/94/5181 267     nRBC 08/08/2020 0     Neutrophils Relative 08/08/2020 74     Immat GRANS % 08/08/2020 1     Lymphocytes Relative 08/08/2020 15     Monocytes Relative 08/08/2020 9     Eosinophils Relative 08/08/2020 0     Basophils Relative 08/08/2020 1     Neutrophils Absolute 08/08/2020 10 36*    Immature Grans Absolute 08/08/2020 0 08     Lymphocytes Absolute 08/08/2020 2 09     Monocytes Absolute 08/08/2020 1 21     Eosinophils Absolute 08/08/2020 0 06     Basophils Absolute 08/08/2020 0 09     POC Glucose 08/08/2020 195*    POC Glucose 08/08/2020 221*    POC Glucose 08/08/2020 234*    POC Glucose 08/08/2020 221*    WBC 08/09/2020 9 54     RBC 08/09/2020 5 01     Hemoglobin 08/09/2020 14 7     Hematocrit 08/09/2020 44 2     MCV 08/09/2020 88     MCH 08/09/2020 29 3     MCHC 08/09/2020 33 3     RDW 08/09/2020 13 1     MPV 08/09/2020 8 1*    Platelets 69/50/1539 247     nRBC 08/09/2020 0     Neutrophils Relative 08/09/2020 61     Immat GRANS % 08/09/2020 0     Lymphocytes Relative 08/09/2020 23     Monocytes Relative 08/09/2020 12     Eosinophils Relative 08/09/2020 3     Basophils Relative 08/09/2020 1     Neutrophils Absolute 08/09/2020 5 90     Immature Grans Absolute 08/09/2020 0 03     Lymphocytes Absolute 08/09/2020 2 16     Monocytes Absolute 08/09/2020 1 10     Eosinophils Absolute 08/09/2020 0 27     Basophils Absolute 08/09/2020 0 08     Sodium 08/09/2020 135*    Potassium 08/09/2020 3 7     Chloride 08/09/2020 101     CO2 08/09/2020 26     ANION GAP 08/09/2020 8     BUN 08/09/2020 7     Creatinine 08/09/2020 0 96     Glucose 08/09/2020 186*    Calcium 08/09/2020 8 8     eGFR 08/09/2020 79     POC Glucose 08/09/2020 174*    POC Glucose 08/09/2020 206*       Radiology Results:   Ct Abdomen Pelvis With Contrast    Result Date: 8/7/2020  Narrative: CT ABDOMEN AND PELVIS WITH IV CONTRAST INDICATION:   Abdominal pain, fever Abdominal pain, acute, nonlocalized abd pain nausea   r/o obstruction, constipation, cystitis  Abdominal pain, nausea, generalized weakness  Fever, chills  History of neurogenic bladder, kidney stones, bladder stones, suprapubic catheter  History of multiple sclerosis, diabetes, hypertension  Prior appendectomy  The patient tested negative for Covid 19 on May 15, 2020  COMPARISON:  Ultrasound dated January 10, 2020  TECHNIQUE:  CT examination of the abdomen and pelvis was performed  Axial, sagittal, and coronal 2D reformatted images were created from the source data and submitted for interpretation  Radiation dose length product (DLP) for this visit:  427 mGy-cm   This examination, like all CT scans performed in the [facility], was performed utilizing techniques to minimize radiation dose exposure, including the use of iterative reconstruction and automated exposure control  IV Contrast:  100 mL of iohexol (OMNIPAQUE) Enteric Contrast:  Enteric contrast was not administered  FINDINGS: ABDOMEN LOWER CHEST:  No clinically significant abnormality identified in the visualized lower chest  LIVER/BILIARY TREE:  There is mild fatty infiltration of the liver  GALLBLADDER:  No calcified gallstones  No pericholecystic inflammatory change  SPLEEN:  Unremarkable  PANCREAS:  There is a questionable hypodensity within the uncinate process of the pancreas, measuring approximately 11 mm (series 2 image 33)  A mass in this area should be excluded  Follow-up is recommended  Nonemergent outpatient MRI is recommended for further characterization, if there are no contraindications  ADRENAL GLANDS:  Unremarkable  KIDNEYS/URETERS:  Small bilateral renal cysts  There is some urothelial enhancement involving the right ureter  Clinical and laboratory correlation regarding the possibility of urinary tract infection/pyelonephritis is recommended  There is no hydronephrosis bilaterally  Renovascular calcifications are present  STOMACH AND BOWEL:  There is a moderate amount of stool in the colon, most pronounced in the rectosigmoid, transverse colon and right colon, suggesting a degree of constipation  Stool in the rectum measures up to 5 7 cm transverse dimension  There is some bowel wall thickening of the rectum and there is some infiltration of the adjacent fat, suggesting stercoral colitis  There is no small bowel obstruction  APPENDIX:  No findings to suggest appendicitis  ABDOMINOPELVIC CAVITY:  As described above  No pneumoperitoneum  VESSELS:  There is atherosclerosis  There is no abdominal aortic aneurysm  PELVIS REPRODUCTIVE ORGANS:  Unremarkable for patient's age  URINARY BLADDER:  The urinary bladder is decompressed by a suprapubic catheter, however, the wall of the urinary bladder appears thickened and somewhat edematous  This suggests urinary tract infection/cystitis  Clinical and laboratory correlation is recommended  Follow-up is recommended  ABDOMINAL WALL/INGUINAL REGIONS:  There are small bilateral fat-containing inguinal hernias  OSSEOUS STRUCTURES:  No acute fracture or destructive osseous lesion  Impression: The urinary bladder is decompressed by a suprapubic catheter  The wall of the urinary bladder appears thickened and somewhat edematous  Additionally, there is some urothelial enhancement involving the right ureter  These findings suggest urinary tract  infection/cystitis and possibly pyelonephritis  Clinical correlation, laboratory correlation and follow-up is recommended  Constipation with findings suggesting stercoral colitis, as described above  Please see discussion  No evidence of small bowel obstruction  There is a questionable hypodensity within the uncinate process of the pancreas, measuring approximately 11 mm  A mass in this area should be excluded  Follow-up is recommended  Nonemergent outpatient MRI is recommended for further characterization, if there are no contraindications  Mild fatty infiltration of the liver  Other findings as described above, please see discussion  The study was marked in [facility] for immediate notification   Workstation performed: [id number]       ---------------------------------------------------  Note Electronically Signed By:    MD [facility] PGY-5  [id number] #: [id number]

## 2020-09-23 ENCOUNTER — HOSPITAL ENCOUNTER (OUTPATIENT)
Dept: RADIOLOGY | Facility: HOSPITAL | Age: 70
Discharge: HOME | End: 2020-09-23
Attending: INTERNAL MEDICINE
Payer: MEDICARE

## 2020-09-23 DIAGNOSIS — K86.89 PANCREATIC MASS: ICD-10-CM

## 2020-09-23 PROCEDURE — A9585 GADOBUTROL INJECTION: HCPCS | Performed by: INTERNAL MEDICINE

## 2020-09-23 PROCEDURE — G1004 CDSM NDSC: HCPCS

## 2020-09-23 PROCEDURE — 74183 MRI ABD W/O CNTR FLWD CNTR: CPT

## 2020-09-23 RX ADMIN — GADOBUTROL 7 ML: 604.72 INJECTION INTRAVENOUS at 16:34

## 2020-10-14 ENCOUNTER — OFFICE VISIT (OUTPATIENT)
Dept: UROLOGY | Facility: CLINIC | Age: 70
End: 2020-10-14
Payer: MEDICARE

## 2020-10-14 VITALS — DIASTOLIC BLOOD PRESSURE: 72 MMHG | HEART RATE: 88 BPM | SYSTOLIC BLOOD PRESSURE: 128 MMHG | TEMPERATURE: 97.8 F

## 2020-10-14 DIAGNOSIS — T83.510S URINARY TRACT INFECTION ASSOCIATED WITH CYSTOSTOMY CATHETER, SEQUELA: ICD-10-CM

## 2020-10-14 DIAGNOSIS — N32.0 BLADDER NECK CONTRACTURE: ICD-10-CM

## 2020-10-14 DIAGNOSIS — N21.0 BLADDER STONES: ICD-10-CM

## 2020-10-14 DIAGNOSIS — R33.9 URINARY RETENTION: ICD-10-CM

## 2020-10-14 DIAGNOSIS — Z93.59 CHRONIC SUPRAPUBIC CATHETER (HCC): ICD-10-CM

## 2020-10-14 DIAGNOSIS — N39.0 URINARY TRACT INFECTION ASSOCIATED WITH CYSTOSTOMY CATHETER, SEQUELA: ICD-10-CM

## 2020-10-14 DIAGNOSIS — N31.9 NEUROGENIC BLADDER: Primary | ICD-10-CM

## 2020-10-14 PROCEDURE — 99213 OFFICE O/P EST LOW 20 MIN: CPT | Performed by: PHYSICIAN ASSISTANT

## 2020-10-23 ENCOUNTER — TRANSCRIBE ORDERS (OUTPATIENT)
Dept: NEUROSURGERY | Facility: CLINIC | Age: 70
End: 2020-10-23

## 2020-10-23 DIAGNOSIS — I72.5 ANEURYSM OF BASILAR ARTERY (HCC): Primary | ICD-10-CM

## 2020-11-27 ENCOUNTER — HOSPITAL ENCOUNTER (OUTPATIENT)
Dept: RADIOLOGY | Facility: HOSPITAL | Age: 70
Discharge: HOME | End: 2020-11-27
Attending: NEUROLOGICAL SURGERY
Payer: MEDICARE

## 2020-11-27 DIAGNOSIS — I72.5 ANEURYSM OF BASILAR ARTERY (HCC): ICD-10-CM

## 2020-11-27 PROCEDURE — A9585 GADOBUTROL INJECTION: HCPCS | Performed by: NEUROLOGICAL SURGERY

## 2020-11-27 PROCEDURE — G1004 CDSM NDSC: HCPCS

## 2020-11-27 PROCEDURE — 70546 MR ANGIOGRAPH HEAD W/O&W/DYE: CPT

## 2020-11-27 RX ADMIN — GADOBUTROL 8 ML: 604.72 INJECTION INTRAVENOUS at 14:44

## 2020-12-09 ENCOUNTER — OFFICE VISIT (OUTPATIENT)
Dept: NEUROSURGERY | Facility: CLINIC | Age: 70
End: 2020-12-09
Payer: MEDICARE

## 2020-12-09 VITALS — DIASTOLIC BLOOD PRESSURE: 88 MMHG | HEART RATE: 102 BPM | SYSTOLIC BLOOD PRESSURE: 152 MMHG | TEMPERATURE: 97 F

## 2020-12-09 DIAGNOSIS — I72.5 ANEURYSM OF BASILAR ARTERY (HCC): Primary | ICD-10-CM

## 2020-12-09 DIAGNOSIS — Z86.73 HISTORY OF CVA (CEREBROVASCULAR ACCIDENT): ICD-10-CM

## 2020-12-09 PROCEDURE — 99214 OFFICE O/P EST MOD 30 MIN: CPT | Performed by: NEUROLOGICAL SURGERY

## 2021-01-15 ENCOUNTER — PROCEDURE VISIT (OUTPATIENT)
Dept: UROLOGY | Facility: CLINIC | Age: 71
End: 2021-01-15
Payer: MEDICARE

## 2021-01-15 DIAGNOSIS — N31.9 NEUROGENIC BLADDER: Primary | ICD-10-CM

## 2021-01-15 PROCEDURE — 52005 CYSTO W/URTRL CATHJ: CPT | Performed by: UROLOGY

## 2021-01-15 RX ORDER — AMOXICILLIN 250 MG
2 CAPSULE ORAL
COMMUNITY

## 2021-01-15 NOTE — PROGRESS NOTES
Cystoscopy     Date/Time 1/15/2021 11:06 AM     Performed by  Charla Carlson MD     Authorized by Charla Carlson MD          Iker Patel is a 76-year-old male with history of a neurogenic bladder  Previously, I believed he had bladder outlet obstruction underwent TURP along with placement of a suprapubic tube  He has never been able to void spontaneously  Unfortunately he also developed a CVA  He is on anticoagulation  In May 2019 he underwent cystoscopy to remove multiple bladder stones  He returns for routine follow-up for cystoscopy today to assess his bladder  Male cystoscopy procedure note:    Risk and benefits of flexible cystoscopy were discussed  Informed consent was obtained  The patient was placed in the supine position  His suprapubic tube tract was prepped and draped in sterile fashion  Viscous lidocaine was instilled  The cystoscope was passed through the suprapubic tube tract  The bladder was thoroughly inspected  There was some areas of thick walled bladder with trabeculation but no mucosal abnormalities or lesions  Typical inflammatory changes with a chronic indwelling suprapubic tube were noted  There was no evidence of urothelial carcinoma  The bladder neck was inspected but the scope was not passed antegrade  There were no stones within the bladder  The bladder was left full  The cystoscope was removed  A new 24 French suprapubic tube was inserted without difficulty and connected to gravity drainage  The bladder was emptied and the suprapubic tube was capped  Impression:  Neurogenic bladder, history of TURP, history of bladder stones    Plan:  I provided the patient with reassurance that there is no sign of mucosal abnormality or lesion nor bladder stones identified  A new suprapubic tube was placed and will remain capped  Will intermittently uncap as needed  Follow-up in 1 year with cystoscopy or sooner if needed

## 2021-02-01 ENCOUNTER — TELEPHONE (OUTPATIENT)
Dept: NEUROLOGY | Facility: CLINIC | Age: 71
End: 2021-02-01

## 2021-02-01 NOTE — TELEPHONE ENCOUNTER
Spoke with patient to convert 2/2 appt with Luke Cansecot to virtual due to weather  Patient agreeable but only able to do telephone, no cell or Internet

## 2021-02-02 ENCOUNTER — TELEMEDICINE (OUTPATIENT)
Dept: NEUROLOGY | Facility: CLINIC | Age: 71
End: 2021-02-02
Payer: MEDICARE

## 2021-02-02 ENCOUNTER — TELEPHONE (OUTPATIENT)
Dept: NEUROLOGY | Facility: CLINIC | Age: 71
End: 2021-02-02

## 2021-02-02 DIAGNOSIS — N31.9 NEUROGENIC BLADDER: ICD-10-CM

## 2021-02-02 DIAGNOSIS — Z86.73 HISTORY OF CVA (CEREBROVASCULAR ACCIDENT): ICD-10-CM

## 2021-02-02 DIAGNOSIS — I72.5 ANEURYSM OF BASILAR ARTERY (HCC): ICD-10-CM

## 2021-02-02 DIAGNOSIS — G35 MULTIPLE SCLEROSIS (HCC): Primary | ICD-10-CM

## 2021-02-02 PROCEDURE — 99442 PR PHYS/QHP TELEPHONE EVALUATION 11-20 MIN: CPT | Performed by: PHYSICIAN ASSISTANT

## 2021-02-02 RX ORDER — SERTRALINE HYDROCHLORIDE 25 MG/1
25 TABLET, FILM COATED ORAL
COMMUNITY

## 2021-02-02 RX ORDER — BUSPIRONE HYDROCHLORIDE 10 MG/1
10 TABLET ORAL 3 TIMES DAILY
COMMUNITY
End: 2021-06-02 | Stop reason: DRUGHIGH

## 2021-02-02 NOTE — ASSESSMENT & PLAN NOTE
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

## 2021-02-02 NOTE — PROGRESS NOTES
Virtual Brief Visit    Assessment/Plan:    Problem List Items Addressed This Visit        Cardiovascular and Mediastinum    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HCC)     S/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 in March 2015 by Dr Migel Pope  He now follows with Dr Odessa Miles  Imaging completed in 2020 was stable  Nervous and Auditory    Multiple sclerosis ([facility] [address] ) - Primary     Patient with very longstanding MS, which has been stable over time  He is not on any disease modifying therapy at this time  He has had progressive weakness in the LEs and now is mainly using the WC, does not ambulate much at all anymore  He is getting PT through [facility]  H e reports some increased neuropathic pain in the legs, mainly at night when he is trying to sleep  He is currently taking gabapentin 300 mg t i d , prescribed by his PCP at [facility]  I will see if they can increase to 300 mg b i d  and 600 mg HS as long as renal function allows (need to see if he has had recent labs done there)  Will continue to monitor him clinically  No need for updated imaging at this time  Other    Neurogenic bladder     Follows with urology, has suprapubic catheter  All stable  History of CVA (cerebrovascular accident)     Patient is s/p left sided CVA in October 2018  This was likely small vessel in origin due to uncontrolled vascular risk factors  He was started on Plavix 75mg (to replace ASA) and Lipitor increased at time of CVA  Statin is not on his med again list today and patient unsure why, does not recall any issues with it  This was also noted at last visit and I called [facility] to find out if there was a reason he was not on a statin and was unsuccessful at reaching a provider there  I called [facility] again today spoke with a nurse, Chante De Jesus    I told her I had some concerns to discuss with a provider and she said she would email them to call me and took my information  I will continue to follow up on this  I also do not see a recent lipid panel  Recent A1C from Aug 2020 was 9 6, not well controlled  He tells me BP is occasionly high when checked at [facility]     I continue to be concerned with his uncontrolled cardiovascular risk factors  We again discussed the important of controlling cardiovascular risk factors for secondary stroke prevention  Plan:   -For ongoing stroke prevention patient will continue Plavix 75 mg daily  Would prefer he be on high-intensity statin and I am going to follow with his PCP at [facility] about this   -Will defer management of his blood pressure, lipids and blood sugar to his PCP  Goal BP would be less than 130/80 on a routine basis, goal LDL less than 70, goal A1c less than 7 0   -heart healthy diet and routine exercise as tolerated  -signs and symptoms of stroke reviewed             Patient to follow up in 6 months or sooner if needed  He was advised to call for any new or worsening symptoms  Reason for visit is   Chief Complaint   Patient presents with    Virtual Brief Visit        Encounter provider Louie Garner PA-C    Provider located at [facility] [address]    Recent Visits  Date Type Provider Dept   02/01/21 Telephone Louie Garner PA-C [facility] recent visits within past 7 days and meeting all other requirements     Today's Visits  Date Type Provider Dept   02/02/21 Telephone Louie Garner PA-C [facility]   02/02/21 [address] [place] [address],  [id number], [facility] [place]   Showing today's visits and meeting all other requirements     Future Appointments  No visits were found meeting these conditions  Showing future appointments within next 150 days and meeting all other requirements        After connecting through telephone, the patient was identified by name and date of birth  Deann Tineo was informed that this is a telemedicine visit and that the visit is being conducted through telephone  My office door was closed  No one else was in the room  He acknowledged consent and understanding of privacy and security of the platform  The patient has agreed to participate and understands he can discontinue the visit at any time  Patient is aware this is a billable service  Subjective     HPI:  68 y/o male presents for neurologic follow up, last seen in December 2019  Patient follows with our office for longstanding MS, as well as a more recent CVA  Patient diagnosed with probable MS in the 1960s  Actually unclear diagnosis from time of presentation to presentation to our center in 2014  Patient recalls at age of 11 having chest pain and then numbness of the left toes tingling up the leg to mid chest around T6 and then down the right side in similar distribution to the right foot  Patient had loss of control of bowel and bladder at that time  He had no ability to walk and was in the hospital for over 6 months  He was only able to ambulate with crutches in his 20s, 30s, 40s, etc  Patient still uses [place] crutches as well as a wheelchair to ambulate  Patient was only ever given ACTH in the past  He was never on IMD meds prior to coming to our center in 2014  He was told he may have had a CVA that affected his walking  Patient with suprapubic catheter placed by Dr Yoly Solis  Labs unremarkable, NMO negative  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  MRI c-spine did not reveal any lesions  MRI t-spine reveals cord lesion from T3-5; no comparison on file  Patient with longstanding dizziness; he has strong family history of brain aneurysms- his sister had 3 aneurysms, 1 niece with 3 aneurysms, 1 niece who passed away from aneurysm and 1 nephew with aneurysm; patient's father with aneurysm in his abdominal area   He had CTA in January 2015, 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  He was seen neuro-surg and underwent 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 in March 2015 by Dr Dino Plaza of neuro-surg  He now follows with Dr Jaqui Carlton for monitoring of the aneurysm  Patient was hospitalized in October 2018 due to RUE weakness and worsened RLE weakness (weak at baseline due to [address])  NIHSS 7  He was not given tPA due to concern this was MS exacerbation  CTH negative for acute infarction  CTA head and neck demonstrated mild narrowing at the origin of the left common carotid artery, vertebral arteries bilaterally, and right internal carotid artery origins without significant stenosis  Approximately 50% narrowing at the origin of the left internal carotid artery  Mild narrowing of the cavernous to supraclinoid internal carotid arteries  No high-grade proximal stenosis to visualized [place]  Stable aneurysm coil seen at the basilar tip  Patient was admitted for further workup  He was also started on antibiotics for suspected UTI  MRI brain demonstrated a 1 2 cm focal area of diffusion restriction adjacent to the posterior body of the left lateral ventricle in the region of the posterior corona radiata without associated contrast enhancement compatible with acute lacunar infarct  A1C 8 4  Lipid panel ,   ECHO with EF 75%, no regional wall motion abnormalities  No atrial dilation  This was felt to be small vessel in origin  His ASA was changed to Plavix 75mg and Lipitor was increased from 20mg to 80mg daily  Today, patient reports he is overall about the same  He denies any new symptoms at this time  he reports he continues to be predominantly non-ambulatory  He notes some increased neuropathic pain in the LEs, especially at night  He is taking gabapentin 300 mg t i d  He continues to follow with Urology for neurogenic bladder and no issues with his suprapubic catheter    He notes some issues with sleeping at night and his providers at [facility] suggested melatonin  He also reports some issues with shortness of breath, feeling winded both at rest and with exertion  He says he has brought this up to his provider at [facility]  He has not seen a cardiologist   After review of his med list, it appears he is still not on a statin  This was noted at his visit last year and I brought my concerns up to [facility] at that time  I do not see a recent lipid panel in his chart  Most recent A1c in August 2020 was 9 6  He does not check his blood pressure at home and says that periodically when checked at [facility] it is elevated (unable to give me numbers)        Past Medical History:   Diagnosis Date    Acute laryngitis     Acute nonsuppurative otitis media, unspecified laterality     Arm weakness     Arthritis     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HCC)     Bladder infection     Bronchitis     Constipation     Cough     Diabetes mellitus (HCC)     Dizziness     Dysfunction of eustachian tube     Erectile dysfunction of non-organic origin     Fatigue     Glaucoma     Hiatal hernia     Hypertension     Imbalance     Leg muscle spasm     MS (multiple sclerosis) (HCC)     Nephrolithiasis     No natural teeth     Sinus pain     Spinal stenosis     Strain of thoracic region     Stroke ([address] )     Suprapubic catheter ([address] )        Past Surgical History:   Procedure Laterality Date    APPENDECTOMY      BRAIN SURGERY      Coil placed in aneurysm    CYSTOSCOPY      CYSTOSCOPY      CYSTOSCOPY  06/11/2018    CYSTOSCOPY  01/15/2021    EYE SURGERY      transscleral cyclophotocoagulation noncontact YAG laser    MYRINGOTOMY      with ventilation tube insertion    MO REMOVE BLADDER STONE,<2 5 CM N/A 5/7/2019    Procedure: CYSTOSCOPY, holmium laser litholapaxy of bladder stones, EXCHANGE OF SP TUBE;  Surgeon: Navya Brown MD;  [facility]: [facility];  Service: Urology    SUPRAPUBIC CATHETER INSERTION         Current Outpatient Medications   Medication Sig Dispense Refill    amLODIPine (NORVASC) 10 mg tablet Take 10 mg by mouth daily   busPIRone (BUSPAR) 10 mg tablet Take 10 mg by mouth 3 (three) times a day      clopidogrel (PLAVIX) 75 mg tablet Take 1 tablet (75 mg total) by mouth daily  0    furosemide (LASIX) 20 mg tablet Take 40 mg by mouth daily        gabapentin (NEURONTIN) 300 mg capsule Take 1 capsule (300 mg total) by mouth 2 (two) times a day AND 2 capsules (600 mg total) daily at bedtime  (Patient taking differently: 1 cap tid) 120 capsule 5    losartan (COZAAR) 50 mg tablet Take 50 mg by mouth daily   metFORMIN (GLUCOPHAGE) 500 mg tablet Take 1,000 mg by mouth 2 (two) times a day with meals        pantoprazole (PROTONIX) 40 mg tablet TAKE 1 TABLET TWICE DAILY 30 MINUTES BEFORE BREAKFAST AND DINNER  180 tablet 1    potassium chloride (K-DUR,KLOR-CON) 10 mEq tablet Take 1 tablet (10 mEq total) by mouth daily  0    propranolol (INDERAL LA) 60 mg 24 hr capsule TAKE 1 CAPSULE DAILY 90 capsule 3    senna-docusate sodium (Senokot S) 8 6-50 mg per tablet Take 1 tablet by mouth daily      sertraline (ZOLOFT) 25 mg tablet Take 25 mg by mouth daily      albuterol (PROVENTIL HFA,VENTOLIN HFA) 90 mcg/act inhaler Inhale 2 puffs every 6 (six) hours as needed for wheezing      ALPRAZolam (XANAX) 0 25 mg tablet Take 0 25 mg by mouth daily at bedtime as needed for anxiety or sleep        atorvastatin (LIPITOR) 20 mg tablet Take 20 mg by mouth daily at bedtime        baclofen 10 mg tablet Take 2 5 mg by mouth 2 (two) times a day Takes 1 tablet in am and 1 tablet @ 2pm      brimonidine-timolol (COMBIGAN) 0 2-0 5 % Administer 1 drop to both eyes every 12 (twelve) hours      dextromethorphan-guaifenesin (MUCINEX DM)  MG per 12 hr tablet Take 1 tablet by mouth every 12 (twelve) hours as needed for cough 14 tablet 0    linaGLIPtin 5 MG TABS Take 5 mg by mouth daily      sitaGLIPtin (JANUVIA) 100 mg tablet Take 100 mg by mouth daily      sucralfate (CARAFATE) 1 g tablet Take 1 tablet (1 g total) by mouth 2 (two) times a day (Patient not taking: Reported on 8/7/2020)  0     No current facility-administered medications for this visit  Allergies   Allergen Reactions    Cephalexin Rash       Review of Systems   Constitutional: Positive for fatigue  Negative for fever  HENT: Negative  Negative for hearing loss, tinnitus and trouble swallowing  Eyes: Negative for photophobia, pain and visual disturbance  Respiratory: Positive for shortness of breath  Negative for cough  Cardiovascular: Positive for leg swelling  Gastrointestinal: Negative for constipation, diarrhea, nausea and vomiting  Endocrine: Negative  Genitourinary: Negative  Musculoskeletal: Positive for gait problem  Skin: Negative  Neurological: Positive for weakness  Negative for dizziness, tremors, syncope, numbness and headaches  Hematological: Negative  Psychiatric/Behavioral: Negative for confusion, decreased concentration, dysphoric mood, hallucinations, sleep disturbance and suicidal ideas  The patient is not nervous/anxious  There were no vitals filed for this visit  I spent 20 minutes directly with the patient during this visit    VIRTUAL VISIT DISCLAIMER    Genoveva Schmidt acknowledges that he has consented to an online visit or consultation  He understands that the online visit is based solely on information provided by him, and that, in the absence of a face-to-face physical evaluation by the physician, the diagnosis he receives is both limited and provisional in terms of accuracy and completeness  This is not intended to replace a full medical face-to-face evaluation by the physician  Genvoeva Schmidt understands and accepts these terms

## 2021-02-02 NOTE — TELEPHONE ENCOUNTER
Called [facility] [phone] and spoke to Eliecer Rivers  I let her know that I had several concerns regarding [facility] and wanted to speak with a provider about him  She took my information including cell and office number, and said she was sending an email to the providers now to have them call me  Concerns I want to address:  1  Why is he not on a statin? At time of CVA in 2018, his Lipitor was increased from 20mg to 80mg and now not on a statin  I do not see any recent lipid panel either  Wondering when last checked  Ideally needs high intensity statin with goal LDL <70  2  Increased neuropathic pain in the legs at night  Currently taking gabapentin 300mg TID, wondering if they can increase to 300/300/600  3  Patient reported SOB at rest and with exertion as well as edema in the feet  Wondering if they are working this up, as he said he has voiced his concerns to his team there  Cardiac/pulm workup? 4  Are his meds for DM, HTN being adjusted? Labs regularly? Last A1C was 9 6 in Aug 2020  Patient tells me his BP is frequently high when checked at [facility]  BP goal should be <130/80 routinely

## 2021-02-02 NOTE — PATIENT INSTRUCTIONS
Continue current medications  I have reached out to [facility]  Would prefer you be on a statin for cholesterol  It is very important your blood sugar, cholesterol and blood pressure are very well controlled   Continue physical therapy   I will also discuss with them about increasing her gabapentin at night   I think melatonin would be fine free to take for sleep at night   I will also bring up your concerns about shortness of breath  Follow-up with us in 6 months     If you experience any facial droop, weakness on one side of the body, speech or swallowing difficulty, painless loss of vision in one eye, double vision, vertigo that does not resolve quickly/imbalance, go to the ER/call 911

## 2021-02-02 NOTE — ASSESSMENT & PLAN NOTE
Patient with very longstanding MS, which has been stable over time  He is not on any disease modifying therapy at this time  He has had progressive weakness in the LEs and now is mainly using the WC, does not ambulate much at all anymore  He is getting PT through [facility]  TAN luke reports some increased neuropathic pain in the legs, mainly at night when he is trying to sleep  He is currently taking gabapentin 300 mg t i d , prescribed by his PCP at [facility]  I will see if they can increase to 300 mg b i d  and 600 mg HS as long as renal function allows (need to see if he has had recent labs done there)  Will continue to monitor him clinically  No need for updated imaging at this time

## 2021-06-02 ENCOUNTER — APPOINTMENT (EMERGENCY)
Dept: CT IMAGING | Facility: HOSPITAL | Age: 71
DRG: 698 | End: 2021-06-02
Payer: MEDICARE

## 2021-06-02 ENCOUNTER — HOSPITAL ENCOUNTER (INPATIENT)
Facility: HOSPITAL | Age: 71
LOS: 1 days | Discharge: HOME | DRG: 698 | End: 2021-06-03
Attending: EMERGENCY MEDICINE | Admitting: HOSPITALIST
Payer: MEDICARE

## 2021-06-02 ENCOUNTER — APPOINTMENT (EMERGENCY)
Dept: RADIOLOGY | Facility: HOSPITAL | Age: 71
DRG: 698 | End: 2021-06-02
Payer: MEDICARE

## 2021-06-02 DIAGNOSIS — N39.0 URINARY TRACT INFECTION: ICD-10-CM

## 2021-06-02 DIAGNOSIS — R73.9 HYPERGLYCEMIA: ICD-10-CM

## 2021-06-02 DIAGNOSIS — R25.1 OCCASIONAL TREMORS: ICD-10-CM

## 2021-06-02 DIAGNOSIS — A41.9 SEPSIS (HCC): Primary | ICD-10-CM

## 2021-06-02 DIAGNOSIS — G35 MULTIPLE SCLEROSIS (HCC): ICD-10-CM

## 2021-06-02 DIAGNOSIS — E11.9 DIABETES (HCC): ICD-10-CM

## 2021-06-02 DIAGNOSIS — R53.1 WEAKNESS: ICD-10-CM

## 2021-06-02 LAB
ANION GAP SERPL CALCULATED.3IONS-SCNC: 10 MMOL/L (ref 4–13)
ATRIAL RATE: 112 BPM
BACTERIA UR QL AUTO: ABNORMAL /HPF
BASOPHILS # BLD AUTO: 0.1 THOUSANDS/ΜL (ref 0–0.1)
BASOPHILS NFR BLD AUTO: 1 % (ref 0–1)
BILIRUB UR QL STRIP: NEGATIVE
BUN SERPL-MCNC: 8 MG/DL (ref 5–25)
CALCIUM SERPL-MCNC: 9.7 MG/DL (ref 8.3–10.1)
CHLORIDE SERPL-SCNC: 102 MMOL/L (ref 100–108)
CLARITY UR: CLEAR
CO2 SERPL-SCNC: 27 MMOL/L (ref 21–32)
COLOR UR: YELLOW
CREAT SERPL-MCNC: 1.15 MG/DL (ref 0.6–1.3)
EOSINOPHIL # BLD AUTO: 0.55 THOUSAND/ΜL (ref 0–0.61)
EOSINOPHIL NFR BLD AUTO: 4 % (ref 0–6)
ERYTHROCYTE [DISTWIDTH] IN BLOOD BY AUTOMATED COUNT: 13.8 % (ref 11.6–15.1)
GFR SERPL CREATININE-BSD FRML MDRD: 64 ML/MIN/1.73SQ M
GLUCOSE SERPL-MCNC: 247 MG/DL (ref 65–140)
GLUCOSE SERPL-MCNC: 314 MG/DL (ref 65–140)
GLUCOSE UR STRIP-MCNC: ABNORMAL MG/DL
HCT VFR BLD AUTO: 47.8 % (ref 36.5–49.3)
HGB BLD-MCNC: 15.9 G/DL (ref 12–17)
HGB UR QL STRIP.AUTO: ABNORMAL
IMM GRANULOCYTES # BLD AUTO: 0.05 THOUSAND/UL (ref 0–0.2)
IMM GRANULOCYTES NFR BLD AUTO: 0 % (ref 0–2)
KETONES UR STRIP-MCNC: NEGATIVE MG/DL
LACTATE SERPL-SCNC: 2.1 MMOL/L (ref 0.5–2)
LACTATE SERPL-SCNC: 2.2 MMOL/L (ref 0.5–2)
LEUKOCYTE ESTERASE UR QL STRIP: ABNORMAL
LYMPHOCYTES # BLD AUTO: 2.94 THOUSANDS/ΜL (ref 0.6–4.47)
LYMPHOCYTES NFR BLD AUTO: 24 % (ref 14–44)
MCH RBC QN AUTO: 30.8 PG (ref 26.8–34.3)
MCHC RBC AUTO-ENTMCNC: 33.3 G/DL (ref 31.4–37.4)
MCV RBC AUTO: 93 FL (ref 82–98)
MONOCYTES # BLD AUTO: 0.9 THOUSAND/ΜL (ref 0.17–1.22)
MONOCYTES NFR BLD AUTO: 7 % (ref 4–12)
NEUTROPHILS # BLD AUTO: 7.82 THOUSANDS/ΜL (ref 1.85–7.62)
NEUTS SEG NFR BLD AUTO: 64 % (ref 43–75)
NITRITE UR QL STRIP: POSITIVE
NON-SQ EPI CELLS URNS QL MICRO: ABNORMAL /HPF
NRBC BLD AUTO-RTO: 0 /100 WBCS
NT-PROBNP SERPL-MCNC: 24 PG/ML
P AXIS: 81 DEGREES
PH UR STRIP.AUTO: 6.5 [PH] (ref 4.5–8)
PLATELET # BLD AUTO: 379 THOUSANDS/UL (ref 149–390)
PMV BLD AUTO: 8.6 FL (ref 8.9–12.7)
POTASSIUM SERPL-SCNC: 4.2 MMOL/L (ref 3.5–5.3)
PR INTERVAL: 164 MS
PROT UR STRIP-MCNC: NEGATIVE MG/DL
QRS AXIS: 72 DEGREES
QRSD INTERVAL: 92 MS
QT INTERVAL: 296 MS
QTC INTERVAL: 404 MS
RBC # BLD AUTO: 5.17 MILLION/UL (ref 3.88–5.62)
RBC #/AREA URNS AUTO: ABNORMAL /HPF
SODIUM SERPL-SCNC: 139 MMOL/L (ref 136–145)
SP GR UR STRIP.AUTO: 1.01 (ref 1–1.03)
T WAVE AXIS: 51 DEGREES
TROPONIN I SERPL-MCNC: <0.02 NG/ML
UROBILINOGEN UR QL STRIP.AUTO: 0.2 E.U./DL
VENTRICULAR RATE: 112 BPM
WBC # BLD AUTO: 12.36 THOUSAND/UL (ref 4.31–10.16)
WBC #/AREA URNS AUTO: ABNORMAL /HPF

## 2021-06-02 PROCEDURE — 99223 1ST HOSP IP/OBS HIGH 75: CPT | Performed by: PHYSICIAN ASSISTANT

## 2021-06-02 PROCEDURE — 87186 SC STD MICRODIL/AGAR DIL: CPT

## 2021-06-02 PROCEDURE — 36415 COLL VENOUS BLD VENIPUNCTURE: CPT | Performed by: EMERGENCY MEDICINE

## 2021-06-02 PROCEDURE — 83605 ASSAY OF LACTIC ACID: CPT | Performed by: EMERGENCY MEDICINE

## 2021-06-02 PROCEDURE — 71046 X-RAY EXAM CHEST 2 VIEWS: CPT

## 2021-06-02 PROCEDURE — 93005 ELECTROCARDIOGRAM TRACING: CPT

## 2021-06-02 PROCEDURE — 82948 REAGENT STRIP/BLOOD GLUCOSE: CPT

## 2021-06-02 PROCEDURE — 99285 EMERGENCY DEPT VISIT HI MDM: CPT

## 2021-06-02 PROCEDURE — 85025 COMPLETE CBC W/AUTO DIFF WBC: CPT | Performed by: EMERGENCY MEDICINE

## 2021-06-02 PROCEDURE — 87077 CULTURE AEROBIC IDENTIFY: CPT

## 2021-06-02 PROCEDURE — 84484 ASSAY OF TROPONIN QUANT: CPT | Performed by: EMERGENCY MEDICINE

## 2021-06-02 PROCEDURE — 73502 X-RAY EXAM HIP UNI 2-3 VIEWS: CPT

## 2021-06-02 PROCEDURE — 80048 BASIC METABOLIC PNL TOTAL CA: CPT | Performed by: EMERGENCY MEDICINE

## 2021-06-02 PROCEDURE — 93010 ELECTROCARDIOGRAM REPORT: CPT | Performed by: INTERNAL MEDICINE

## 2021-06-02 PROCEDURE — 87040 BLOOD CULTURE FOR BACTERIA: CPT | Performed by: EMERGENCY MEDICINE

## 2021-06-02 PROCEDURE — 83880 ASSAY OF NATRIURETIC PEPTIDE: CPT | Performed by: EMERGENCY MEDICINE

## 2021-06-02 PROCEDURE — 87086 URINE CULTURE/COLONY COUNT: CPT

## 2021-06-02 PROCEDURE — 96374 THER/PROPH/DIAG INJ IV PUSH: CPT

## 2021-06-02 PROCEDURE — 83605 ASSAY OF LACTIC ACID: CPT | Performed by: PHYSICIAN ASSISTANT

## 2021-06-02 PROCEDURE — 81001 URINALYSIS AUTO W/SCOPE: CPT

## 2021-06-02 PROCEDURE — 99284 EMERGENCY DEPT VISIT MOD MDM: CPT | Performed by: EMERGENCY MEDICINE

## 2021-06-02 PROCEDURE — 70450 CT HEAD/BRAIN W/O DYE: CPT

## 2021-06-02 RX ORDER — FUROSEMIDE 40 MG/1
40 TABLET ORAL DAILY
COMMUNITY

## 2021-06-02 RX ORDER — CALCIUM CARBONATE 200(500)MG
1000 TABLET,CHEWABLE ORAL 3 TIMES DAILY PRN
Status: DISCONTINUED | OUTPATIENT
Start: 2021-06-02 | End: 2021-06-03 | Stop reason: HOSPADM

## 2021-06-02 RX ORDER — POLYETHYLENE GLYCOL 3350 17 G/17G
17 POWDER, FOR SOLUTION ORAL DAILY PRN
Status: DISCONTINUED | OUTPATIENT
Start: 2021-06-02 | End: 2021-06-03 | Stop reason: HOSPADM

## 2021-06-02 RX ORDER — ONDANSETRON 2 MG/ML
4 INJECTION INTRAMUSCULAR; INTRAVENOUS EVERY 6 HOURS PRN
Status: DISCONTINUED | OUTPATIENT
Start: 2021-06-02 | End: 2021-06-03 | Stop reason: HOSPADM

## 2021-06-02 RX ORDER — ATORVASTATIN CALCIUM 80 MG/1
80 TABLET, FILM COATED ORAL DAILY
Status: DISCONTINUED | OUTPATIENT
Start: 2021-06-03 | End: 2021-06-03

## 2021-06-02 RX ORDER — LOSARTAN POTASSIUM 50 MG/1
50 TABLET ORAL DAILY
Status: DISCONTINUED | OUTPATIENT
Start: 2021-06-03 | End: 2021-06-03 | Stop reason: HOSPADM

## 2021-06-02 RX ORDER — LEVOFLOXACIN 750 MG/1
750 TABLET ORAL EVERY 24 HOURS
Status: DISCONTINUED | OUTPATIENT
Start: 2021-06-02 | End: 2021-06-03 | Stop reason: HOSPADM

## 2021-06-02 RX ORDER — LEVOFLOXACIN 5 MG/ML
750 INJECTION, SOLUTION INTRAVENOUS ONCE
Status: COMPLETED | OUTPATIENT
Start: 2021-06-02 | End: 2021-06-02

## 2021-06-02 RX ORDER — CLOPIDOGREL BISULFATE 75 MG/1
75 TABLET ORAL DAILY
Status: DISCONTINUED | OUTPATIENT
Start: 2021-06-03 | End: 2021-06-03 | Stop reason: HOSPADM

## 2021-06-02 RX ORDER — AMLODIPINE BESYLATE AND ATORVASTATIN CALCIUM 10; 80 MG/1; MG/1
1 TABLET, FILM COATED ORAL DAILY
COMMUNITY

## 2021-06-02 RX ORDER — AMOXICILLIN 250 MG
2 CAPSULE ORAL
Status: DISCONTINUED | OUTPATIENT
Start: 2021-06-02 | End: 2021-06-03 | Stop reason: HOSPADM

## 2021-06-02 RX ORDER — ACETAMINOPHEN 500 MG/1
500 CAPSULE ORAL EVERY 6 HOURS PRN
COMMUNITY

## 2021-06-02 RX ORDER — LANOLIN ALCOHOL/MO/W.PET/CERES
3 CREAM (GRAM) TOPICAL
Status: DISCONTINUED | OUTPATIENT
Start: 2021-06-02 | End: 2021-06-03 | Stop reason: HOSPADM

## 2021-06-02 RX ORDER — DEXTRAN 70 AND HYPROMELLOSE 2910 1; 3 MG/ML; MG/ML
2 SOLUTION/ DROPS OPHTHALMIC 4 TIMES DAILY PRN
COMMUNITY

## 2021-06-02 RX ORDER — LATANOPROST 50 UG/ML
1 SOLUTION/ DROPS OPHTHALMIC
Status: DISCONTINUED | OUTPATIENT
Start: 2021-06-02 | End: 2021-06-03 | Stop reason: HOSPADM

## 2021-06-02 RX ORDER — SERTRALINE HYDROCHLORIDE 25 MG/1
25 TABLET, FILM COATED ORAL
Status: DISCONTINUED | OUTPATIENT
Start: 2021-06-02 | End: 2021-06-03 | Stop reason: HOSPADM

## 2021-06-02 RX ORDER — OXYCODONE HYDROCHLORIDE 5 MG/1
5 TABLET ORAL EVERY 4 HOURS PRN
Status: DISCONTINUED | OUTPATIENT
Start: 2021-06-02 | End: 2021-06-03 | Stop reason: HOSPADM

## 2021-06-02 RX ORDER — MENTHOL 40 MG/ML
1 GEL TOPICAL 4 TIMES DAILY PRN
COMMUNITY

## 2021-06-02 RX ORDER — AMLODIPINE BESYLATE 10 MG/1
10 TABLET ORAL DAILY
Status: DISCONTINUED | OUTPATIENT
Start: 2021-06-03 | End: 2021-06-03

## 2021-06-02 RX ORDER — ALBUTEROL SULFATE 90 UG/1
2 AEROSOL, METERED RESPIRATORY (INHALATION) EVERY 6 HOURS PRN
Status: DISCONTINUED | OUTPATIENT
Start: 2021-06-02 | End: 2021-06-03 | Stop reason: HOSPADM

## 2021-06-02 RX ORDER — OXYCODONE HYDROCHLORIDE 5 MG/1
2.5 TABLET ORAL EVERY 4 HOURS PRN
Status: DISCONTINUED | OUTPATIENT
Start: 2021-06-02 | End: 2021-06-03 | Stop reason: HOSPADM

## 2021-06-02 RX ORDER — PROPRANOLOL HCL 60 MG
60 CAPSULE, EXTENDED RELEASE 24HR ORAL DAILY
Status: DISCONTINUED | OUTPATIENT
Start: 2021-06-03 | End: 2021-06-03 | Stop reason: HOSPADM

## 2021-06-02 RX ORDER — LIDOCAINE 4 G/G
1 PATCH TOPICAL DAILY
COMMUNITY

## 2021-06-02 RX ORDER — BUSPIRONE HYDROCHLORIDE 5 MG/1
7.5 TABLET ORAL
Status: DISCONTINUED | OUTPATIENT
Start: 2021-06-02 | End: 2021-06-03 | Stop reason: HOSPADM

## 2021-06-02 RX ORDER — ALBUTEROL SULFATE 2.5 MG/3ML
2.5 SOLUTION RESPIRATORY (INHALATION) EVERY 6 HOURS PRN
COMMUNITY

## 2021-06-02 RX ORDER — DULAGLUTIDE 0.75 MG/.5ML
0.75 INJECTION, SOLUTION SUBCUTANEOUS WEEKLY
COMMUNITY

## 2021-06-02 RX ORDER — LEVOFLOXACIN 5 MG/ML
750 INJECTION, SOLUTION INTRAVENOUS EVERY 24 HOURS
Status: DISCONTINUED | OUTPATIENT
Start: 2021-06-03 | End: 2021-06-02

## 2021-06-02 RX ORDER — BUSPIRONE HYDROCHLORIDE 7.5 MG/1
7.5 TABLET ORAL SEE ADMIN INSTRUCTIONS
COMMUNITY

## 2021-06-02 RX ORDER — FUROSEMIDE 40 MG/1
40 TABLET ORAL DAILY
Status: DISCONTINUED | OUTPATIENT
Start: 2021-06-03 | End: 2021-06-03 | Stop reason: HOSPADM

## 2021-06-02 RX ORDER — PROPRANOLOL HCL 60 MG
60 CAPSULE, EXTENDED RELEASE 24HR ORAL DAILY
COMMUNITY

## 2021-06-02 RX ORDER — LATANOPROST 50 UG/ML
1 SOLUTION/ DROPS OPHTHALMIC
COMMUNITY

## 2021-06-02 RX ORDER — MECLIZINE HCL 12.5 MG/1
12.5 TABLET ORAL 2 TIMES DAILY PRN
Status: DISCONTINUED | OUTPATIENT
Start: 2021-06-02 | End: 2021-06-03 | Stop reason: HOSPADM

## 2021-06-02 RX ORDER — ACETAMINOPHEN 325 MG/1
650 TABLET ORAL EVERY 6 HOURS PRN
Status: DISCONTINUED | OUTPATIENT
Start: 2021-06-02 | End: 2021-06-03 | Stop reason: HOSPADM

## 2021-06-02 RX ORDER — GUAIFENESIN AND DEXTROMETHORPHAN HYDROBROMIDE 1200; 60 MG/1; MG/1
1 TABLET, EXTENDED RELEASE ORAL 2 TIMES DAILY PRN
COMMUNITY

## 2021-06-02 RX ORDER — ALPRAZOLAM 0.25 MG/1
0.25 TABLET ORAL 2 TIMES DAILY PRN
Status: DISCONTINUED | OUTPATIENT
Start: 2021-06-02 | End: 2021-06-03 | Stop reason: HOSPADM

## 2021-06-02 RX ORDER — MECLIZINE HCL 12.5 MG/1
12.5 TABLET ORAL 2 TIMES DAILY PRN
COMMUNITY

## 2021-06-02 RX ORDER — POTASSIUM CHLORIDE 750 MG/1
10 TABLET, EXTENDED RELEASE ORAL DAILY
Status: DISCONTINUED | OUTPATIENT
Start: 2021-06-03 | End: 2021-06-03 | Stop reason: HOSPADM

## 2021-06-02 RX ORDER — POLYETHYLENE GLYCOL 3350 17 G/17G
17 POWDER, FOR SOLUTION ORAL DAILY PRN
COMMUNITY

## 2021-06-02 RX ORDER — PANTOPRAZOLE SODIUM 40 MG/1
40 TABLET, DELAYED RELEASE ORAL
Status: DISCONTINUED | OUTPATIENT
Start: 2021-06-03 | End: 2021-06-03 | Stop reason: HOSPADM

## 2021-06-02 RX ORDER — LANOLIN ALCOHOL/MO/W.PET/CERES
3 CREAM (GRAM) TOPICAL
COMMUNITY

## 2021-06-02 RX ADMIN — BUSPIRONE HYDROCHLORIDE 7.5 MG: 5 TABLET ORAL at 22:07

## 2021-06-02 RX ADMIN — SERTRALINE HYDROCHLORIDE 25 MG: 25 TABLET ORAL at 22:06

## 2021-06-02 RX ADMIN — LEVOFLOXACIN 750 MG: 5 INJECTION, SOLUTION INTRAVENOUS at 18:32

## 2021-06-02 RX ADMIN — MELATONIN 3 MG: at 22:06

## 2021-06-02 RX ADMIN — OXYCODONE HYDROCHLORIDE 5 MG: 5 TABLET ORAL at 22:06

## 2021-06-02 RX ADMIN — LATANOPROST 1 DROP: 50 SOLUTION OPHTHALMIC at 22:13

## 2021-06-02 NOTE — ED PROVIDER NOTES
History  Chief Complaint   Patient presents with    Difficulty Walking     Pt c/o difficulty getting up to stand from seated or lying position and shakiness over last few days; also c/o right groin pain that began today; Denies CP & SOB     78-year-old male presents for evaluation of difficulty transitioning from being seated or lying flat into his wheelchair  He states he is normally able to pull himself out of bed with his arms onto his legs into his wheelchair with a lift device  He states today he was unable to do so even without help from another person  He states this is new for him  He is using unable to use his legs secondary to MS but reports having adequate strength for this in his upper extremity  Patient denies similar symptoms to this previously  He states that he also has tremors normally but states even worse for the past several days  He states that for trying to transition today complains of a sharp severe right inguinal crease pain  He states the pain is constant, worse with movement  He states it is mild at rest   Denies recent falls or head trauma, chest pain, shortness of breath, focal neuro deficits or weakness other than baseline deficits, lightheadedness or dizziness  History provided by:  Patient      Prior to Admission Medications   Prescriptions Last Dose Informant Patient Reported? Taking?    ALPRAZolam (XANAX) 0 25 mg tablet  Self Yes Yes   Sig: Take 0 25 mg by mouth 2 (two) times a day as needed for anxiety or sleep    Acetaminophen (Mapap) 500 MG   Yes Yes   Sig: Take 500 mg by mouth every 6 (six) hours as needed for mild pain or moderate pain   Dextromethorphan-guaiFENesin (Mucinex DM Maximum Strength)  MG TB12   Yes Yes   Sig: Take 1 tablet by mouth 2 (two) times a day as needed   Dulaglutide (Trulicity) 8 24 MV/6 8ZM SOPN   Yes Yes   Sig: Inject 0 75 mg under the skin once a week   Ergocalciferol (VITAMIN D2 PO)   Yes Yes   Sig: Take 50,000 Units by mouth once a week   Lidocaine 4 % PTCH   Yes Yes   Sig: Apply 1 patch topically daily Leave in place for 12 hours   Menthol, Topical Analgesic, (Biofreeze) 4 % GEL   Yes Yes   Sig: Apply 1 application topically 4 (four) times a day as needed   albuterol (2 5 mg/3 mL) 0 083 % nebulizer solution   Yes Yes   Sig: Take 2 5 mg by nebulization every 6 (six) hours as needed for wheezing or shortness of breath   albuterol (PROVENTIL HFA,VENTOLIN HFA) 90 mcg/act inhaler  Self Yes Yes   Sig: Inhale 2 puffs every 6 (six) hours as needed for wheezing   amLODIPine-atorvastatin (CADUET) 10-80 MG per tablet   Yes Yes   Sig: Take 1 tablet by mouth daily   busPIRone (BUSPAR) 7 5 mg tablet   Yes Yes   Sig: Take 7 5 mg by mouth see administration instructions Take 2 tablets (15 mg) every morning; Take 1 tablet (7 5 mg) every evening   clopidogrel (PLAVIX) 75 mg tablet  Self No Yes   Sig: Take 1 tablet (75 mg total) by mouth daily   dextran 70-hypromellose (GenTeal Tears) 0 1-0 3 % ophthalmic solution   Yes Yes   Sig: Administer 2 drops to both eyes 4 (four) times a day as needed   furosemide (LASIX) 40 mg tablet   Yes Yes   Sig: Take 40 mg by mouth daily   latanoprost (XALATAN) 0 005 % ophthalmic solution   Yes Yes   Sig: Administer 1 drop to both eyes daily at bedtime   losartan (COZAAR) 50 mg tablet  Self Yes Yes   Sig: Take 50 mg by mouth daily     meclizine (ANTIVERT) 12 5 MG tablet   Yes Yes   Sig: Take 12 5 mg by mouth 2 (two) times a day as needed for dizziness   melatonin 3 mg   Yes Yes   Sig: Take 3 mg by mouth daily at bedtime as needed   metFORMIN (GLUCOPHAGE) 1000 MG tablet   Yes Yes   Sig: Take 1,000 mg by mouth 2 (two) times a day   pantoprazole (PROTONIX) 40 mg tablet  Self No Yes   Sig: TAKE 1 TABLET TWICE DAILY 30 MINUTES BEFORE BREAKFAST AND DINNER    polyethylene glycol (MIRALAX) 17 g packet   Yes Yes   Sig: Take 17 g by mouth daily as needed   potassium chloride (K-DUR,KLOR-CON) 10 mEq tablet  Self No Yes   Sig: Take 1 tablet (10 mEq total) by mouth daily   propranolol (INDERAL LA) 60 mg 24 hr capsule   Yes Yes   Sig: Take 60 mg by mouth daily   senna-docusate sodium (Senokot S) 8 6-50 mg per tablet   Yes Yes   Sig: Take 2 tablets by mouth daily at bedtime    sertraline (ZOLOFT) 25 mg tablet   Yes Yes   Sig: Take 25 mg by mouth daily at bedtime       Facility-Administered Medications: None       Past Medical History:   Diagnosis Date    Acute laryngitis     Acute nonsuppurative otitis media, unspecified laterality     Arm weakness     Arthritis     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HCC)     Bladder infection     Bronchitis     Constipation     Cough     Diabetes mellitus (HCC)     Dizziness     Dysfunction of eustachian tube     Erectile dysfunction of non-organic origin     Fatigue     Glaucoma     Hiatal hernia     Hypertension     Imbalance     Leg muscle spasm     [facility]     Nephrolithiasis     No natural teeth     Sinus pain     Spinal stenosis     Strain of thoracic region     Stroke ([address]     Suprapubic catheter ([address] )        Past Surgical History:   Procedure Laterality Date    APPENDECTOMY      BRAIN SURGERY      Coil placed in aneurysm    CYSTOSCOPY      CYSTOSCOPY      CYSTOSCOPY  06/11/2018    CYSTOSCOPY  01/15/2021    EYE SURGERY      transscleral cyclophotocoagulation noncontact YAG laser    MYRINGOTOMY      with ventilation tube insertion    TN REMOVE BLADDER STONE,<2 5 CM N/A 5/7/2019    Procedure: CYSTOSCOPY, holmium laser litholapaxy of bladder stones, EXCHANGE OF SP TUBE;  Surgeon: Rosa Higgins MD;  [facility];  Service: Urology    SUPRAPUBIC CATHETER INSERTION         Family History   Problem Relation Age of Onset    Heart attack Mother     Stroke Mother     Heart attack Father     Anuerysm Father         In Stomach      I have reviewed and agree with the history as documented      E-Cigarette/Vaping    E-Cigarette Use Never User E-Cigarette/Vaping Substances     Social History     Tobacco Use    Smoking status: Former Smoker     Types: Cigarettes    Smokeless tobacco: Never Used    Tobacco comment: smoked for 15-20 years, stopped about 25 years ago   Substance Use Topics    Alcohol use: Not Currently    Drug use: No       Review of Systems   Constitutional: Negative for chills, diaphoresis, fatigue and fever  HENT: Negative for ear pain and sore throat  Eyes: Negative for pain and visual disturbance  Respiratory: Negative for cough and shortness of breath  Cardiovascular: Negative for chest pain and palpitations  Gastrointestinal: Negative for abdominal pain, diarrhea, nausea and vomiting  Genitourinary: Negative for decreased urine volume, hematuria, penile swelling and scrotal swelling  Musculoskeletal: Negative for arthralgias and back pain  Skin: Negative for color change and rash  Neurological: Positive for tremors and weakness  Negative for dizziness, seizures, syncope, facial asymmetry and headaches  All other systems reviewed and are negative  Physical Exam  Physical Exam  Constitutional:       Appearance: He is well-developed  HENT:      Head: Normocephalic and atraumatic  Right Ear: External ear normal       Left Ear: External ear normal    Eyes:      General: No scleral icterus  Conjunctiva/sclera: Conjunctivae normal    Neck:      Musculoskeletal: Normal range of motion  Vascular: No JVD  Trachea: No tracheal deviation  Cardiovascular:      Rate and Rhythm: Regular rhythm  Tachycardia present  Pulmonary:      Effort: Pulmonary effort is normal  No respiratory distress  Abdominal:      General: There is no distension  Tenderness: There is no abdominal tenderness  Comments: Suprapubic catheter in place  Patient has no swelling, redness, mass noted in the right inguinal crease  There is no right hip swelling, redness, warmth       Musculoskeletal: Normal range of motion  General: No deformity  Skin:     Coloration: Skin is not pale  Findings: No erythema or rash  Neurological:      Mental Status: He is alert and oriented to person, place, and time  Mental status is at baseline  Comments: 5/5 strength bilateral upper extremities, normal finger-nose bilateral   Patient has paralysis and weakness in bilateral lower extremities which she states is chronic  Psychiatric:         Behavior: Behavior normal          Vital Signs  ED Triage Vitals   Temperature Pulse Respirations Blood Pressure SpO2   06/02/21 1637 06/02/21 1616 06/02/21 1616 06/02/21 1616 06/02/21 1616   98 °F (36 7 °C) (!) 120 20 (!) 194/93 98 %      Temp Source Heart Rate Source Patient Position - Orthostatic VS BP Location FiO2 (%)   06/02/21 1637 06/02/21 1616 06/02/21 1830 06/02/21 1830 --   Oral Monitor Lying Right arm       Pain Score       06/02/21 1616       No Pain           Vitals:    06/02/21 1616 06/02/21 1830   BP: (!) 194/93 136/64   Pulse: (!) 120 100   Patient Position - Orthostatic VS:  Lying         Visual Acuity      ED Medications  Medications   levofloxacin (LEVAQUIN) IVPB (premix in dextrose) 750 mg 150 mL (750 mg Intravenous New Bag 6/2/21 1832)       Diagnostic Studies  Results Reviewed     Procedure Component Value Units Date/Time    Blood culture #1 [[id number]] Collected: 06/02/21 1728    Lab Status: In process Specimen: Blood from Arm, Left Updated: 06/02/21 1835    Lactic acid [[id number]] Collected: 06/02/21 1828    Lab Status: In process Specimen: Blood from Arm, Right Updated: 06/02/21 1835    Blood culture #2 [[id number]] Collected: 06/02/21 1828    Lab Status:  In process Specimen: Blood from Arm, Right Updated: 06/02/21 1834    Urine Microscopic [[id number]]  (Abnormal) Collected: 06/02/21 1727    Lab Status: Final result Specimen: Urine, Clean Catch Updated: 06/02/21 1821     RBC, UA 10-20 /hpf      WBC, UA 10-20 /hpf      Epithelial Cells Occasional /hpf      Bacteria, UA Innumerable /hpf     Urine culture [[id number]] Collected: 06/02/21 1727    Lab Status:  In process Specimen: Urine, Clean Catch Updated: 06/02/21 5450    Basic metabolic panel [[id number]]  (Abnormal) Collected: 06/02/21 1702    Lab Status: Final result Specimen: Blood from Arm, Left Updated: 06/02/21 1735     Sodium 139 mmol/L      Potassium 4 2 mmol/L      Chloride 102 mmol/L      CO2 27 mmol/L      ANION GAP 10 mmol/L      BUN 8 mg/dL      Creatinine 1 15 mg/dL      Glucose 314 mg/dL      Calcium 9 7 mg/dL      eGFR 64 ml/min/1 73sq m     Narrative:      [place] guidelines for Chronic Kidney Disease (CKD):     Stage 1 with normal or high GFR (GFR > 90 mL/min/1 73 square meters)    Stage 2 Mild CKD (GFR = 60-89 mL/min/1 73 square meters)    Stage 3A Moderate CKD (GFR = 45-59 mL/min/1 73 square meters)    Stage 3B Moderate CKD (GFR = 30-44 mL/min/1 73 square meters)    Stage 4 Severe CKD (GFR = 15-29 mL/min/1 73 square meters)    Stage 5 End Stage CKD (GFR <15 mL/min/1 73 square meters)  Note: GFR calculation is accurate only with a steady state creatinine    NT-BNP PRO [[id number]]  (Normal) Collected: 06/02/21 1702    Lab Status: Final result Specimen: Blood from Arm, Left Updated: 06/02/21 1735     NT-proBNP 24 pg/mL     Troponin I [[id number]]  (Normal) Collected: 06/02/21 1702    Lab Status: Final result Specimen: Blood from Arm, Left Updated: 06/02/21 1732     Troponin I <0 02 ng/mL     Urine Macroscopic, POC [[id number]]  (Abnormal) Collected: 06/02/21 1727    Lab Status: Final result Specimen: Urine Updated: 06/02/21 1728     Color, UA Yellow     Clarity, UA Clear     pH, UA 6 5     Leukocytes, UA Small     Nitrite, UA Positive     Protein, UA Negative mg/dl      Glucose,  (1/2%) mg/dl      Ketones, UA Negative mg/dl      Urobilinogen, UA 0 2 E U /dl      Bilirubin, UA Negative     Blood, UA Large     Specific [place], UA 1 015    Narrative: CLINITEK RESULT    CBC and differential [[id number]]  (Abnormal) Collected: 06/02/21 1702    Lab Status: Final result Specimen: Blood from Arm, Left Updated: 06/02/21 1707     WBC 12 36 Thousand/uL      RBC 5 17 Million/uL      Hemoglobin 15 9 g/dL      Hematocrit 47 8 %      MCV 93 fL      MCH 30 8 pg      MCHC 33 3 g/dL      RDW 13 8 %      MPV 8 6 fL      Platelets 263 Thousands/uL      nRBC 0 /100 WBCs      Neutrophils Relative 64 %      Immat GRANS % 0 %      Lymphocytes Relative 24 %      Monocytes Relative 7 %      Eosinophils Relative 4 %      Basophils Relative 1 %      Neutrophils Absolute 7 82 Thousands/µL      Immature Grans Absolute 0 05 Thousand/uL      Lymphocytes Absolute 2 94 Thousands/µL      Monocytes Absolute 0 90 Thousand/µL      Eosinophils Absolute 0 55 Thousand/µL      Basophils Absolute 0 10 Thousands/µL                  CT head without contrast   Final Result by Lu Thao MD (06/02 1824)      No acute intracranial abnormality                    Workstation performed: [id number]         XR chest 2 views   ED Interpretation by Kaz Velasco MD (06/02 1814)   Primary reviewed: No acute abnormality      XR hip/pelv 2-3 vws right   ED Interpretation by Kaz Velasco MD (06/02 1814)   Primary reviewed: No acute abnormality                 Procedures  ECG 12 Lead Documentation Only    Date/Time: 6/2/2021 5:11 PM  Performed by: Kaz Velasco MD  Authorized by: Kaz Velasco MD     ECG reviewed by me, the ED Provider: yes    Patient location:  ED  Rate:     ECG rate:  112    ECG rate assessment: tachycardic    Rhythm:     Rhythm: sinus tachycardia    Ectopy:     Ectopy: none    QRS:     QRS axis:  Normal    QRS intervals:  Normal  Conduction:     Conduction: normal    ST segments:     ST segments:  Normal  T waves:     T waves: normal               ED Course  ED Course as of Jun 02 1838   Wed Jun 02, 2021   1750 Will tx for uti   Nitrite, UA(!): Positive   1750 Will do septic work up   WBC(!): 12 36   1833 WBC, UA(!): 10-20                         Initial Sepsis Screening     Row Name 06/02/21 1751                Is the patient's history suggestive of a new or worsening infection?  --        Suspected source of infection  urinary tract infection  -MH        Are two or more of the following signs & symptoms of infection both present and new to the patient? (!) Yes (Proceed)  -MH        Indicate SIRS criteria  Tachycardia > 90 bpm;Leukocytosis (WBC > 96349 IJL)  -MH        If the answer is yes to both questions, suspicion of sepsis is present  --        If severe sepsis is present AND tissue hypoperfusion perists in the hour after fluid resuscitation or lactate > 4, the patient meets criteria for SEPTIC SHOCK  --        Are any of the following organ dysfunction criteria present within 6 hours of suspected infection and SIRS criteria that are NOT considered to be chronic conditions? --        Organ dysfunction  --        Date of presentation of severe sepsis  --        Time of presentation of severe sepsis  --        Tissue hypoperfusion persists in the hour after crystalloid fluid administration, evidenced, by either:  --        Was hypotension present within one hour of the conclusion of crystalloid fluid administration?  --        Date of presentation of septic shock  --        Time of presentation of septic shock  --          User Key  (r) = Recorded By, (t) = Taken By, (c) = Cosigned By    [address] Name Provider Type    Lauro Multani MD Physician          SBIRT 20yo+      Most Recent Value   SBIRT (25 yo +)   In order to provide better care to our patients, we are screening all of our patients for alcohol and drug use  Would it be okay to ask you these screening questions?   No Filed at: 06/02/2021 1621                    [facility]  Number of Diagnoses or Management Options  Diagnosis management comments: Weakness- will ct head to r/o stroke, cardiac/septic/metabolic work up    R iguinal pain w/ benign exam-will do xray to r/o fx      Disposition  Final diagnoses:   Sepsis ([facility] )   Urinary tract infection   Multiple sclerosis ([facility] )   Hyperglycemia   Diabetes ([facility] )   Weakness   Occasional tremors     Time reflects when diagnosis was documented in both MDM as applicable and the Disposition within this note     Time User Action Codes Description Comment    6/2/2021  6:36 PM Elvan Lapidus Add [A41 9] Sepsis ([facility] )     6/2/2021  6:36 PM Elvan Lapidus Add [N39 0] Urinary tract infection     6/2/2021  6:36 PM Karime Zaldivar Add [G35] Multiple sclerosis ([facility] )     6/2/2021  6:36 PM Elvan Lapidus Add [R73 9] Hyperglycemia     6/2/2021  6:36 PM Elvan Lapidus Add [E11 9] Diabetes ([place] [address] )     6/2/2021  6:36 PM HosakGraceir Add [R53 1] Weakness     6/2/2021  6:36 PM HosGrace jenkinsir Add [R25 1] Tremors of nervous system     6/2/2021  6:36 PM HosKarime jenkins Remove [R25 1] Tremors of nervous system     6/2/2021  6:38 PM HosGrace jenkinsir Add [R25 1] Occasional tremors       ED Disposition     ED Disposition Condition Date/Time Comment    Admit Stable Wed Jun 2, 2021  5:51 PM Case was discussed with LISSETTE and the patient's admission status was agreed to be Admission Status: observation status to the service of Dr Saúl Echeverria   Follow-up Information    None         Patient's Medications   Discharge Prescriptions    No medications on file     No discharge procedures on file      PDMP Review     None          ED Provider  Electronically Signed by           Ino Santillan MD  06/02/21 8130

## 2021-06-03 VITALS
SYSTOLIC BLOOD PRESSURE: 135 MMHG | BODY MASS INDEX: 31.6 KG/M2 | RESPIRATION RATE: 18 BRPM | DIASTOLIC BLOOD PRESSURE: 69 MMHG | WEIGHT: 184.08 LBS | HEART RATE: 89 BPM | OXYGEN SATURATION: 95 % | TEMPERATURE: 98.4 F

## 2021-06-03 LAB
ANION GAP SERPL CALCULATED.3IONS-SCNC: 11 MMOL/L (ref 4–13)
BASOPHILS # BLD AUTO: 0.1 THOUSANDS/ΜL (ref 0–0.1)
BASOPHILS NFR BLD AUTO: 1 % (ref 0–1)
BUN SERPL-MCNC: 6 MG/DL (ref 5–25)
CALCIUM SERPL-MCNC: 9 MG/DL (ref 8.3–10.1)
CHLORIDE SERPL-SCNC: 103 MMOL/L (ref 100–108)
CO2 SERPL-SCNC: 23 MMOL/L (ref 21–32)
CREAT SERPL-MCNC: 0.81 MG/DL (ref 0.6–1.3)
EOSINOPHIL # BLD AUTO: 0.69 THOUSAND/ΜL (ref 0–0.61)
EOSINOPHIL NFR BLD AUTO: 6 % (ref 0–6)
ERYTHROCYTE [DISTWIDTH] IN BLOOD BY AUTOMATED COUNT: 13.6 % (ref 11.6–15.1)
GFR SERPL CREATININE-BSD FRML MDRD: 89 ML/MIN/1.73SQ M
GLUCOSE SERPL-MCNC: 178 MG/DL (ref 65–140)
GLUCOSE SERPL-MCNC: 203 MG/DL (ref 65–140)
GLUCOSE SERPL-MCNC: 206 MG/DL (ref 65–140)
GLUCOSE SERPL-MCNC: 239 MG/DL (ref 65–140)
GLUCOSE SERPL-MCNC: 243 MG/DL (ref 65–140)
HCT VFR BLD AUTO: 41.3 % (ref 36.5–49.3)
HGB BLD-MCNC: 13.7 G/DL (ref 12–17)
IMM GRANULOCYTES # BLD AUTO: 0.04 THOUSAND/UL (ref 0–0.2)
IMM GRANULOCYTES NFR BLD AUTO: 0 % (ref 0–2)
LACTATE SERPL-SCNC: 1.3 MMOL/L (ref 0.5–2)
LYMPHOCYTES # BLD AUTO: 3.71 THOUSANDS/ΜL (ref 0.6–4.47)
LYMPHOCYTES NFR BLD AUTO: 31 % (ref 14–44)
MCH RBC QN AUTO: 29.5 PG (ref 26.8–34.3)
MCHC RBC AUTO-ENTMCNC: 33.2 G/DL (ref 31.4–37.4)
MCV RBC AUTO: 89 FL (ref 82–98)
MONOCYTES # BLD AUTO: 0.87 THOUSAND/ΜL (ref 0.17–1.22)
MONOCYTES NFR BLD AUTO: 7 % (ref 4–12)
NEUTROPHILS # BLD AUTO: 6.51 THOUSANDS/ΜL (ref 1.85–7.62)
NEUTS SEG NFR BLD AUTO: 55 % (ref 43–75)
NRBC BLD AUTO-RTO: 0 /100 WBCS
PLATELET # BLD AUTO: 318 THOUSANDS/UL (ref 149–390)
PMV BLD AUTO: 8.5 FL (ref 8.9–12.7)
POTASSIUM SERPL-SCNC: 3.7 MMOL/L (ref 3.5–5.3)
RBC # BLD AUTO: 4.65 MILLION/UL (ref 3.88–5.62)
SODIUM SERPL-SCNC: 137 MMOL/L (ref 136–145)
WBC # BLD AUTO: 11.92 THOUSAND/UL (ref 4.31–10.16)

## 2021-06-03 PROCEDURE — 99222 1ST HOSP IP/OBS MODERATE 55: CPT | Performed by: PSYCHIATRY & NEUROLOGY

## 2021-06-03 PROCEDURE — 80048 BASIC METABOLIC PNL TOTAL CA: CPT | Performed by: PHYSICIAN ASSISTANT

## 2021-06-03 PROCEDURE — 82948 REAGENT STRIP/BLOOD GLUCOSE: CPT

## 2021-06-03 PROCEDURE — 99239 HOSP IP/OBS DSCHRG MGMT >30: CPT | Performed by: HOSPITALIST

## 2021-06-03 PROCEDURE — 85025 COMPLETE CBC W/AUTO DIFF WBC: CPT | Performed by: PHYSICIAN ASSISTANT

## 2021-06-03 PROCEDURE — 83605 ASSAY OF LACTIC ACID: CPT | Performed by: INTERNAL MEDICINE

## 2021-06-03 PROCEDURE — 97163 PT EVAL HIGH COMPLEX 45 MIN: CPT | Performed by: PHYSICAL THERAPIST

## 2021-06-03 RX ORDER — ATORVASTATIN CALCIUM 80 MG/1
80 TABLET, FILM COATED ORAL
Status: DISCONTINUED | OUTPATIENT
Start: 2021-06-04 | End: 2021-06-03 | Stop reason: HOSPADM

## 2021-06-03 RX ORDER — GABAPENTIN 300 MG/1
300 CAPSULE ORAL 2 TIMES DAILY
Status: DISCONTINUED | OUTPATIENT
Start: 2021-06-03 | End: 2021-06-03 | Stop reason: HOSPADM

## 2021-06-03 RX ORDER — GABAPENTIN 300 MG/1
300 CAPSULE ORAL 2 TIMES DAILY
Qty: 60 CAPSULE | Refills: 0 | Status: SHIPPED | OUTPATIENT
Start: 2021-06-03

## 2021-06-03 RX ORDER — GABAPENTIN 300 MG/1
600 CAPSULE ORAL
Qty: 30 CAPSULE | Refills: 0 | Status: SHIPPED | OUTPATIENT
Start: 2021-06-03

## 2021-06-03 RX ORDER — GABAPENTIN 300 MG/1
600 CAPSULE ORAL
Status: DISCONTINUED | OUTPATIENT
Start: 2021-06-03 | End: 2021-06-03 | Stop reason: HOSPADM

## 2021-06-03 RX ORDER — AMLODIPINE BESYLATE 10 MG/1
10 TABLET ORAL DAILY
Status: DISCONTINUED | OUTPATIENT
Start: 2021-06-04 | End: 2021-06-03 | Stop reason: HOSPADM

## 2021-06-03 RX ADMIN — POTASSIUM CHLORIDE 10 MEQ: 750 TABLET, EXTENDED RELEASE ORAL at 08:12

## 2021-06-03 RX ADMIN — INSULIN LISPRO 2 UNITS: 100 INJECTION, SOLUTION INTRAVENOUS; SUBCUTANEOUS at 08:14

## 2021-06-03 RX ADMIN — ALBUTEROL SULFATE 2 PUFF: 90 AEROSOL, METERED RESPIRATORY (INHALATION) at 17:39

## 2021-06-03 RX ADMIN — INSULIN LISPRO 3 UNITS: 100 INJECTION, SOLUTION INTRAVENOUS; SUBCUTANEOUS at 17:36

## 2021-06-03 RX ADMIN — BUSPIRONE HYDROCHLORIDE 7.5 MG: 5 TABLET ORAL at 17:35

## 2021-06-03 RX ADMIN — ONDANSETRON 4 MG: 2 INJECTION INTRAMUSCULAR; INTRAVENOUS at 09:03

## 2021-06-03 RX ADMIN — ALPRAZOLAM 0.25 MG: 0.25 TABLET ORAL at 03:12

## 2021-06-03 RX ADMIN — INSULIN LISPRO 3 UNITS: 100 INJECTION, SOLUTION INTRAVENOUS; SUBCUTANEOUS at 11:54

## 2021-06-03 RX ADMIN — CLOPIDOGREL BISULFATE 75 MG: 75 TABLET ORAL at 08:11

## 2021-06-03 RX ADMIN — OXYCODONE HYDROCHLORIDE 5 MG: 5 TABLET ORAL at 03:12

## 2021-06-03 RX ADMIN — ENOXAPARIN SODIUM 40 MG: 40 INJECTION SUBCUTANEOUS at 08:11

## 2021-06-03 RX ADMIN — PANTOPRAZOLE SODIUM 40 MG: 40 TABLET, DELAYED RELEASE ORAL at 07:04

## 2021-06-03 RX ADMIN — LOSARTAN POTASSIUM 50 MG: 50 TABLET, FILM COATED ORAL at 08:12

## 2021-06-03 RX ADMIN — BUSPIRONE HYDROCHLORIDE 15 MG: 10 TABLET ORAL at 08:12

## 2021-06-03 RX ADMIN — AMLODIPINE BESYLATE 10 MG: 10 TABLET ORAL at 08:12

## 2021-06-03 RX ADMIN — INSULIN LISPRO 2 UNITS: 100 INJECTION, SOLUTION INTRAVENOUS; SUBCUTANEOUS at 00:15

## 2021-06-03 RX ADMIN — PROPRANOLOL HYDROCHLORIDE 60 MG: 60 CAPSULE, EXTENDED RELEASE ORAL at 08:13

## 2021-06-03 RX ADMIN — FUROSEMIDE 40 MG: 40 TABLET ORAL at 08:11

## 2021-06-03 RX ADMIN — ATORVASTATIN CALCIUM 80 MG: 80 TABLET, FILM COATED ORAL at 08:11

## 2021-06-03 RX ADMIN — GABAPENTIN 300 MG: 300 CAPSULE ORAL at 11:54

## 2021-06-03 RX ADMIN — CALCIUM CARBONATE (ANTACID) CHEW TAB 500 MG 1000 MG: 500 CHEW TAB at 12:22

## 2021-06-03 RX ADMIN — GABAPENTIN 300 MG: 300 CAPSULE ORAL at 17:35

## 2021-06-03 RX ADMIN — PANTOPRAZOLE SODIUM 40 MG: 40 TABLET, DELAYED RELEASE ORAL at 17:35

## 2021-06-03 NOTE — PLAN OF CARE
Problem: PHYSICAL THERAPY ADULT  Goal: Performs mobility at highest level of function for planned discharge setting  See evaluation for individualized goals  Description: Treatment/Interventions: LE strengthening/ROM, Therapeutic exercise, Endurance training, Bed mobility, Equipment eval/education, Spoke to nursing, Functional transfer training, Patient/family training  Equipment Recommended: Wheelchair(transfer device, has both)       See flowsheet documentation for full assessment, interventions and recommendations  Note: Prognosis: Guarded  Problem List: Decreased strength, Decreased range of motion, Decreased endurance, Impaired balance, Decreased mobility, Impaired sensation, Impaired tone, Obesity, Pain  Assessment: pt admitted with abdominal pain, dx with uti, pt referred to PT  pt lives in 1 story home with sister and brother who assist his care for MS  pt does not amb  uses w/c for short distances  needed quick move type of transfer device with assistance  pt needs assist bathing and dressing  pt demonstrated mobility close to baseline  pt needed min assist for rolling using bedrails  needed max assist for supine to/ from  sitting on eob  pt was able to self support after a few minutes  pt does have marked spasticity ble, little and weak arom ble  pt reporting r groin pain, noted to be tender proximal medial thigh but not directily in groin, suspect muecle trauma from recent stretching  pt will need skilled PT for deficits in strength, balance, rom, coordination, tone, self care, balance  pt will be able to return home if he can transfer using quick move  pt does get PT with [employer] as well as other services  recommend continueation of those services  Barriers to Discharge: None        PT Discharge Recommendation: Home with outpatient rehabilitation     PT - OK to Discharge: Yes    See flowsheet documentation for full assessment

## 2021-06-03 NOTE — ASSESSMENT & PLAN NOTE
Lab Results   Component Value Date    HGBA1C 9 6 (H) 08/07/2020       No results for input(s): POCGLU in the last 72 hours      Blood Sugar Average: Last 72 hrs:  · Maintained outpatient on metformin and Trulicity  · Will hold oral medications and start sliding scale insulin

## 2021-06-03 NOTE — UTILIZATION REVIEW
Inpatient Admission Authorization Request   NOTIFICATION OF INPATIENT ADMISSION/INPATIENT AUTHORIZATION REQUEST   SERVICING FACILITY:   [address] [place] [address]  [address] [place] [address], [address]   Tax ID: [id number]  NPI: [id number]  Place of Service: Inpatient [id number]  Place of Service Code: 24     ATTENDING PROVIDER:  Attending Name and NPI#: Dinora Sosey [[id number]]  Address: [address] [place] [address], [address]   Phone: [phone]     UTILIZATION REVIEW CONTACT:  Julio Cesar Lowe, Utilization   Network Utilization Review Department  Phone: [phone]  Fax: [phone]  Email: [email or web address]     PHYSICIAN ADVISORY SERVICES:  FOR [id number] REVIEW - MEDICAL NECESSITY DENIAL  Phone: [phone]  Fax: [phone]  Email: [email or web address]     TYPE OF REQUEST:  Inpatient Status     ADMISSION INFORMATION:  ADMISSION DATE/TIME: 6/2/21 1838  PATIENT DIAGNOSIS CODE/DESCRIPTION:  Multiple sclerosis ([facility] ) [G35]  Diabetes ([facility] ) [E11 9]  Weakness [R53 1]  Urinary tract infection [N39 0]  Hyperglycemia [R73 9]  Occasional tremors [R25 1]  Sepsis ([facility] ) [A41 9]  DISCHARGE DATE/TIME: No discharge date for patient encounter  DISCHARGE DISPOSITION (IF DISCHARGED): Home with Home Health Care     IMPORTANT INFORMATION:  Please contact the Julio Cesar Lowe directly with any questions or concerns regarding this request  Department voicemails are confidential     Send requests for admission clinical reviews, concurrent reviews, approvals, and administrative denials due to lack of clinical to fax [phone]

## 2021-06-03 NOTE — CASE MANAGEMENT
Patient here with sepsis on oral antibiotics  Patient has [employer] insurance who are in charge of patient's discharge planning  PT/OT recommending home rehab and wheel chair  Patient has a wheel chair at home  CM called Ada from [employer] [phone] and gave them updates  CM will continue to follow  Update:    Patient written for discharge  [facility] uses [place] [facility], CM requested BLS from [facility] [employer]  [facility] [employer] to call back with pickup time  Transport sheet in chart       Per [facility] 8pm pickup, CM made RN at bedside aware

## 2021-06-03 NOTE — ASSESSMENT & PLAN NOTE
· Patient met sepsis criteria time of admission with tachycardia, leukocytosis and lactic acidosis  · Source likely urinary given abnormal UA  · Continue IV Levaquin  · Follow-up urine and blood cultures

## 2021-06-03 NOTE — CONSULTS
Consultation - Neurology   Desmond Law 69 y o  male MRN: [id number]  Unit/Bed#: E5 -01 Encounter: [id number]      Assessment/Plan     Urinary tract infection associated with cystostomy catheter [facility])  Assessment & Plan  74-year-old male with MS not on DMT, left lacunar infarct in 2018, basilar aneurysm status post coiling/stenting, hypertension, DM2, neurogenic bladder status post suprapubic catheter, presents with increased generalized weakness in the setting of UTI/sepsis  Do not suspect acute MS flare at this time  Suspect generalized weakness on presentation likely related to UTI/sepsis  Patient reports significant improvement today, essentially back at his baseline  Plan:  -no further inpatient neuro imaging necessary at this time   -will reorder patient's gabapentin 300 mg/300 mg/600 mg  Patient does have bilateral lower extremity edema, which may be related to the use of gabapentin  It is not particularly bothersome to the patient at this time but should be monitored   -treatment of infectious/metabolic derangements as per primary service  -PT/OT  -patient to follow up with Neurology as an outpatient  Appointment requested  Multiple sclerosis ([facility] )  Assessment & Plan  Follows with [facility] and not on disease modifying therapy  History of CVA (cerebrovascular accident)  Assessment & Plan  Left lacunar infarct in 2018  Patient is on atorvastatin 80 mg and clopidogrel 75 mg daily  Continue same  Desmond Law will need follow up in in 6 weeks with multiple sclerosis attending or advance practitioner  He will not require outpatient neurological testing      History of Present Illness     Reason for Consult / Principal Problem:  MS, generalized weakness  Hx and PE limited by:  N/A  HPI: Desmond Law is a 69 y o  right handed male with MS not on DMT, left lacunar infarct in 2018, basilar aneurysm status post coiling/stenting, hypertension, DM2, neurogenic bladder status post suprapubic catheter, presents with increased generalized weakness in the setting of UTI/sepsis  Patient was unable to lift himself out of his wheelchair yesterday as he normally does, and felt more mentally Libra Chelsey, with sharp inguinal pain  He was found to have UTI (has suprapubic catheter) and met sepsis criteria  Patient follows with [address] [place] [address] Neurology and is not on disease modifying therapy for his MS  He is essentially wheelchair-bound and lives at home with visiting nursing assistance  Per the last neurology note from February of this year, patient takes gabapentin 300 mg/300 mg/600 mg daily for his neuropathic pain  He does have some chronic bilateral lower extremity edema  Today, patient states he feels much better and like to go home  States he feels back at his baseline and fears longer he stays in the hospital, the more deconditioned he will become  Neurologic exam significant for diffuse hyperreflexia with bilateral sustained ankle clonus, bilateral lower extremity weakness and numbness  Inpatient consult to Neurology  Consult performed by: Radha Feng PA-C  Consult ordered by: Devin Coats PA-C          Review of Systems   Constitutional: Negative  HENT: Negative  Eyes: Negative  Respiratory: Negative  Cardiovascular: Negative  Gastrointestinal: Positive for nausea  Endocrine: Negative  Genitourinary: Negative  Musculoskeletal:        Sharp inguinal pain   Skin: Negative for rash  Allergic/Immunologic: Negative  Neurological: Positive for weakness and numbness  As above  Hematological: Negative  Psychiatric/Behavioral: Negative          Historical Information   Past Medical History:   Diagnosis Date    Acute laryngitis     Acute nonsuppurative otitis media, unspecified laterality     Arm weakness     Arthritis     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HCC)     Bladder infection     Bronchitis     Constipation     Cough     Diabetes mellitus (HCC)     Dizziness     Dysfunction of eustachian tube     Erectile dysfunction of non-organic origin     Fatigue     Glaucoma     Hiatal hernia     Hypertension     Imbalance     Leg muscle spasm     MS (multiple sclerosis) (HCC)     Nephrolithiasis     No natural teeth     Sinus pain     Spinal stenosis     Strain of thoracic region     Stroke [facility])     Suprapubic catheter ([address] )      Past Surgical History:   Procedure Laterality Date    APPENDECTOMY      BRAIN SURGERY      Coil placed in aneurysm    CYSTOSCOPY      CYSTOSCOPY      CYSTOSCOPY  06/11/2018    CYSTOSCOPY  01/15/2021    EYE SURGERY      transscleral cyclophotocoagulation noncontact YAG laser    MYRINGOTOMY      with ventilation tube insertion    DE REMOVE BLADDER STONE,<2 5 CM N/A 5/7/2019    Procedure: CYSTOSCOPY, holmium laser litholapaxy of bladder stones, EXCHANGE OF SP TUBE;  Surgeon: Shahid Garcia MD;  Location: [facility];  Service: Urology    SUPRAPUBIC CATHETER INSERTION       Social History   Social History     Substance and Sexual Activity   Alcohol Use Not Currently     Social History     Substance and Sexual Activity   Drug Use No     E-Cigarette/Vaping    E-Cigarette Use Never User      E-Cigarette/Vaping Substances     Social History     Tobacco Use   Smoking Status Former Smoker    Types: Cigarettes   Smokeless Tobacco Never Used   Tobacco Comment    smoked for 15-20 years, stopped about 25 years ago     Family History:   Family History   Problem Relation Age of Onset    Heart attack Mother     Stroke Mother     Heart attack Father     Anuerysm Father         In Stomach        Review of previous medical records was completed  Meds/Allergies   PTA meds:   Prior to Admission Medications   Prescriptions Last Dose Informant Patient Reported? Taking?    ALPRAZolam (XANAX) 0 25 mg tablet  Self Yes Yes   Sig: Take 0 25 mg by mouth 2 (two) times a day as needed for anxiety or sleep    Acetaminophen (Mapap) 500 MG   Yes Yes   Sig: Take 500 mg by mouth every 6 (six) hours as needed for mild pain or moderate pain   Dextromethorphan-guaiFENesin (Mucinex DM Maximum Strength)  MG TB12   Yes Yes   Sig: Take 1 tablet by mouth 2 (two) times a day as needed   Dulaglutide (Trulicity) 9 57 CN/3 8JA SOPN   Yes Yes   Sig: Inject 0 75 mg under the skin once a week   Ergocalciferol (VITAMIN D2 PO)   Yes Yes   Sig: Take 50,000 Units by mouth once a week   Lidocaine 4 % PTCH   Yes Yes   Sig: Apply 1 patch topically daily Leave in place for 12 hours   Menthol, Topical Analgesic, (Biofreeze) 4 % GEL   Yes Yes   Sig: Apply 1 application topically 4 (four) times a day as needed   albuterol (2 5 mg/3 mL) 0 083 % nebulizer solution   Yes Yes   Sig: Take 2 5 mg by nebulization every 6 (six) hours as needed for wheezing or shortness of breath   albuterol (PROVENTIL HFA,VENTOLIN HFA) 90 mcg/act inhaler  Self Yes Yes   Sig: Inhale 2 puffs every 6 (six) hours as needed for wheezing   amLODIPine-atorvastatin (CADUET) 10-80 MG per tablet   Yes Yes   Sig: Take 1 tablet by mouth daily   busPIRone (BUSPAR) 7 5 mg tablet   Yes Yes   Sig: Take 7 5 mg by mouth see administration instructions Take 2 tablets (15 mg) every morning; Take 1 tablet (7 5 mg) every evening   clopidogrel (PLAVIX) 75 mg tablet  Self No Yes   Sig: Take 1 tablet (75 mg total) by mouth daily   dextran 70-hypromellose (GenTeal Tears) 0 1-0 3 % ophthalmic solution   Yes Yes   Sig: Administer 2 drops to both eyes 4 (four) times a day as needed   furosemide (LASIX) 40 mg tablet   Yes Yes   Sig: Take 40 mg by mouth daily   latanoprost (XALATAN) 0 005 % ophthalmic solution   Yes Yes   Sig: Administer 1 drop to both eyes daily at bedtime   losartan (COZAAR) 50 mg tablet  Self Yes Yes   Sig: Take 50 mg by mouth daily     meclizine (ANTIVERT) 12 5 MG tablet   Yes Yes   Sig: Take 12 5 mg by mouth 2 (two) times a day as needed for dizziness   melatonin 3 mg Yes Yes   Sig: Take 3 mg by mouth daily at bedtime as needed   metFORMIN (GLUCOPHAGE) 1000 MG tablet   Yes Yes   Sig: Take 1,000 mg by mouth 2 (two) times a day   pantoprazole (PROTONIX) 40 mg tablet  Self No Yes   Sig: TAKE 1 TABLET TWICE DAILY 30 MINUTES BEFORE BREAKFAST AND DINNER    polyethylene glycol (MIRALAX) 17 g packet   Yes Yes   Sig: Take 17 g by mouth daily as needed   potassium chloride (K-DUR,KLOR-CON) 10 mEq tablet  Self No Yes   Sig: Take 1 tablet (10 mEq total) by mouth daily   propranolol (INDERAL LA) 60 mg 24 hr capsule   Yes Yes   Sig: Take 60 mg by mouth daily   senna-docusate sodium (Senokot S) 8 6-50 mg per tablet   Yes Yes   Sig: Take 2 tablets by mouth daily at bedtime    sertraline (ZOLOFT) 25 mg tablet   Yes Yes   Sig: Take 25 mg by mouth daily at bedtime       Facility-Administered Medications: None       Allergies   Allergen Reactions    Cephalexin Rash       Objective   Vitals:Blood pressure 121/89, pulse (!) 106, temperature (!) 97 4 °F (36 3 °C), resp  rate 17, weight 83 5 kg (184 lb 1 4 oz), SpO2 93 %  ,Body mass index is 31 6 kg/m²  Intake/Output Summary (Last 24 hours) at 6/3/2021 1412  Last data filed at 6/3/2021 0651  Gross per 24 hour   Intake --   Output 2025 ml   Net -2025 ml       Invasive Devices: Invasive Devices     Peripheral Intravenous Line            Peripheral IV 06/02/21 Left Antecubital less than 1 day          Drain            Urethral Catheter 299 days    Suprapubic Catheter Latex 24 Fr  139 days                Physical Exam   General:  Patient is well-developed, obese BMI 31 60, and in no acute distress  HEENT:  Head normocephalic  Eyes anicteric  Cardiovascular:  With regular rhythm  Lungs:  Normal effort  Nonlabored breathing  Extremities:  With left lower extremity greater than right lower extremity bilateral pitting edema  Skin: No rashes      Neurologic Exam  Mental Status:  Patient is alert, pleasantly interactive, and appropriately conversational   No obvious symbolic language difficulty or dysarthria, and the patient is fully oriented  Correctly identified the current president when given cue  Gait deferred as patient wheelchair bound at baseline  Cranial Nerves:   II: Visual fields full to confrontation  Pupils equal, round, reactive to light with normal accomodation  Cannot visualize optic fundi  III,IV,VI:  Right esotropia, which is chronic  Dysconjugate gaze  Extraocular movements intact with no nystagmus  V: Sensation in the V1 through V3 distributions intact to light touch bilaterally  VII: Face is symmetric with no weakness noted  VIII: Audition intact to finger rub bilaterally  IX/X: Uvula midline  Soft palate elevation symmetric  XII: Tongue midline with no atrophy or fasciculations with appropriate movement  Coordination:  Accurate with finger-to-nose maneuvers bilaterally  Motor testing with 4+/5 bilateral upper extremity strength  2/5 bilateral hip flexor strength  4/5 bilateral knee flexion and extension  0/5 right ankle dorsiflexion and 1/5 right plantar flexion  5/5 left dorsiflexion and plantar flexion  Sensory testing grossly intact to light touch bilateral upper extremities but diminished bilateral lower extremities  Diffusely hyperreflexic throughout, 3+ in the right upper extremity, 3 in the left upper extremity, 4 at the bilateral knees and 5+ bilateral ankles (sustained clonus)  Ronal's negative  Toes bilaterally up      Lab Results:   CBC:   Results from last 7 days   Lab Units 06/03/21  0503 06/02/21  1702   WBC Thousand/uL 11 92* 12 36*   RBC Million/uL 4 65 5 17   HEMOGLOBIN g/dL 13 7 15 9   HEMATOCRIT % 41 3 47 8   MCV fL 89 93   PLATELETS Thousands/uL 318 379   , BMP/CMP:   Results from last 7 days   Lab Units 06/03/21  0503 06/02/21  1702   SODIUM mmol/L 137 139   POTASSIUM mmol/L 3 7 4 2   CHLORIDE mmol/L 103 102   CO2 mmol/L 23 27   BUN mg/dL 6 8   CREATININE mg/dL 0  81 1 15   CALCIUM mg/dL 9 0 9 7   EGFR ml/min/1 73sq m 89 64   , Urinalysis:   Results from last 7 days   Lab Units 06/02/21  1727   COLOR UA  Yellow   CLARITY UA  Clear   SPEC GRAV UA  1 015   PH UA  6 5   LEUKOCYTES UA  Small*   NITRITE UA  Positive*   GLUCOSE UA mg/dl 500 (1/2%)*   KETONES UA mg/dl Negative   BILIRUBIN UA  Negative   BLOOD UA  Large*     Imaging Studies: I have personally reviewed pertinent reports  and I have personally reviewed pertinent films in PACS CT head  EKG, Pathology, and Other Studies: I have personally reviewed pertinent reports      VTE Prophylaxis: Sequential compression device Elizabeth Lizarraga     Code Status: Level 1 - Full Code  Advance Directive and Living Will:      Power of :    POLST:

## 2021-06-03 NOTE — TRANSPORTATION MEDICAL NECESSITY
Section I - General Information    Name of Patient: Srinivasa Latham                 : 1950    Medicare #: [id number]  Transport Date: 21 (PCS is valid for round trips on this date and for all repetitive trips in the 60-day range as noted below )  Origin: [address]                                                         Destination: [address]  Is the pt's stay covered under Medicare Part A (PPS/DRG)   [x]     Closest appropriate facility? If no, why is transport to more distant facility required? Yes  If hospice pt, is this transport related to pt's terminal illness? NA       Section II - Medical Necessity Questionnaire  Ambulance transportation is medically necessary only if other means of transport are contraindicated or would be potentially harmful to the patient  To meet this requirement, the patient must either be "bed confined" or suffer from a condition such that transport by means other than ambulance is contraindicated by the patient's condition  The following questions must be answered by the medical professional signing below for this form to be valid:    1)  Describe the MEDICAL CONDITION (physical and/or mental) of this patient AT [address] [place] [address] that requires the patient to be transported in an ambulance and why transport by other means is contraindicated by the patient's condition: Max assist of 2 people, contact isolation for MDRO, fall risk, pain, spasticity, tremors, poor sitting balance, fatigue    2) Is the patient "bed confined" as defined below? Yes  To be "be confined" the patient must satisfy all three of the following conditions: (1) unable to get up from bed without Assistance; AND (2) unable to ambulate; AND (3) unable to sit in a chair or wheelchair  3) Can this patient safely be transported by car or wheelchair van (i e , seated during transport without a medical attendant or monitoring)?    No    4) In addition to completing questions 1-3 above, please check any of the following conditions that apply*:   *Note: supporting documentation for any boxes checked must be maintained in the patient's medical records  If hosp-hosp transfer, describe services needed at 2nd facility not available at 1st facility? Medical attendant required   Special handling/isolation/infection control precautions required   Unable to tolerate seated position for time needed to transport   Other(specify) fall risk, max assist of 2 people      Section III - Signature of Physician or Healthcare Professional  I certify that the above information is true and correct based on my evaluation of this patient, and represent that the patient requires transport by ambulance and that other forms of transport are contraindicated  I understand that this information will be used by the Centers for Medicare and Medicaid Services (CMS) to support the determination of medical necessity for ambulance services, and I represent that I have personal knowledge of the patient's condition at time of transport  []  If this box is checked, I also certify that the patient is physically or mentally incapable of signing the ambulance service's claim and that the institution with which I am affiliated has furnished care, services, or assistance to the patient  My signature below is made on behalf of the patient pursuant to 42 CFR §424 36(b)(4)  In accordance with 42 CFR §424 37, the specific reason(s) that the patient is physically or mentally incapable of signing the claim form is as follows:  Clint Yang Physician* or [address] [place] [address]____________________________________________________________  Signature Date 06/03/21 (For scheduled repetitive transports, this form is not valid for transports performed more than 60 days after this date)    Printed Name & Credentials of Physician or Healthcare Professional (MD, , RN, etc )__Yash Wheat BSN CM______________________________  *Form must be signed by patient's attending physician for scheduled, repetitive transports   For non-repetitive, unscheduled ambulance transports, if unable to obtain the signature of the attending physician, any of the following may sign (choose appropriate option below)  [] Physician Assistant []  Clinical Nurse Specialist [x]  Registered Nurse  []  Nurse Practitioner  [x] Discharge Planner

## 2021-06-03 NOTE — ASSESSMENT & PLAN NOTE
Left lacunar infarct in 2018  Patient is on atorvastatin 80 mg and clopidogrel 75 mg daily  Continue same

## 2021-06-03 NOTE — ASSESSMENT & PLAN NOTE
41-year-old male with MS not on DMT, left lacunar infarct in 2018, basilar aneurysm status post coiling/stenting, hypertension, DM2, neurogenic bladder status post suprapubic catheter, presents with increased generalized weakness in the setting of UTI/sepsis  Do not suspect acute MS flare at this time  Suspect generalized weakness on presentation likely related to UTI/sepsis  Patient reports significant improvement today, essentially back at his baseline  Plan:  -no further inpatient neuro imaging necessary at this time   -will reorder patient's gabapentin 300 mg/300 mg/600 mg  Patient does have bilateral lower extremity edema, which may be related to the use of gabapentin  It is not particularly bothersome to the patient at this time but should be monitored   -treatment of infectious/metabolic derangements as per primary service  -PT/OT  -patient to follow up with Neurology as an outpatient  Appointment requested

## 2021-06-03 NOTE — UTILIZATION REVIEW
Initial Clinical Review    Admission: Date/Time/Statement:   Admission Orders (From admission, onward)     Ordered        06/02/21 1838  Inpatient Admission  Once                   Orders Placed This Encounter   Procedures    Inpatient Admission     Standing Status:   Standing     Number of Occurrences:   1     Order Specific Question:   Level of Care     Answer:   Med Surg [16]     Order Specific Question:   Estimated length of stay     Answer:   More than 2 Midnights     Order Specific Question:   Certification     Answer:   I certify that inpatient services are medically necessary for this patient for a duration of greater than two midnights  See H&P and MD Progress Notes for additional information about the patient's course of treatment  ED Arrival Information     Expected Arrival Acuity Means of Arrival Escorted By Service Admission Type    - 6/2/2021 16:13 Urgent Ambulance [facility] ([address]) Hospitalist Urgent    Arrival Complaint    Weakness        Chief Complaint   Patient presents with    Difficulty Walking     Pt c/o difficulty getting up to stand from seated or lying position and shakiness over last few days; also c/o right groin pain that began today; Denies CP & SOB       Initial Presentation: 69 y o  male with past medical history of CVA, suprapubic catheter-neurogenic bladder, MS, hypertension, glaucoma, diabetes who presents to ED from home via EMS with weakness, preventing pt from getting OOB, mental fogginess and decreased concentration, worsening tremors  Pt was treated with p o antibiotics for UTI approximately 3 days ago  Does have chronic suprapubic catheter  Also reports right groin pain which worsened today  Notes both muscle cramps and sharp pain  On exam, BP elevated, pt tachycardic, has BLE chronic weakness bilaterally  Labs show elevated WBC's and elevated lactic acid  UA shows evidence of UTI  CT head negative  Pt given IV Levaquin in ED  Pt admitted as inpatient with sepsis, source likely urine  Plan- continue IV levaquin, f/u cultures, continue Plavix and lipitor, consult neurology , Continue Lasix, losartan and propranolol    Date: 6/3  Day 2:   Neurology-Do not suspect acute MS flare at this time  Suspect generalized weakness on presentation likely related to UTI/sepsis  reorder patient's gabapentin 300 mg/300 mg/600 mg  Patient does have bilateral lower extremity edema, which may be related to the use of gabapentin  It is not particularly bothersome to the patient at this time but should be monitored  PT/OT vira  Pt reports  improvement today , back to his baseline  PT recommends home with outpatient rehab upon discharge  IV abx changed to po today  WBC's downtrending  Lactic acid normalized  ED Triage Vitals   Temperature Pulse Respirations Blood Pressure SpO2   06/02/21 1637 06/02/21 1616 06/02/21 1616 06/02/21 1616 06/02/21 1616   98 °F (36 7 °C) (!) 120 20 (!) 194/93 98 %      Temp Source Heart Rate Source Patient Position - Orthostatic VS BP Location FiO2 (%)   06/02/21 1637 06/02/21 1616 06/02/21 1830 06/02/21 1830 --   Oral Monitor Lying Right arm       Pain Score       06/02/21 1616       No Pain          Wt Readings from Last 1 Encounters:   06/02/21 83 5 kg (184 lb 1 4 oz)     Additional Vital Signs:   Date/Time  Temp  Pulse  Resp  BP  MAP (mmHg)  SpO2    06/03/21 07:11:39  97 4 °F (36 3 °C)Abnormal   106Abnormal   17  121/89  100  93 %    06/02/21 23:22:11  98 4 °F (36 9 °C)  100  --  124/58  80  94 %    06/02/21 2200  --  78  --  --  --  --    06/02/21 2000  --  75  --  --  --  --    06/02/21 19:38:43  98 2 °F (36 8 °C)  107Abnormal   18  162/80  107  98 %    06/02/21 1830  --  100  18  136/64  --  99 %        Pertinent Labs/Diagnostic Test Results:        6/2  ECG-Sinus tachycardia  CXR-No acute cardiopulmonary disease  XR R hip-No acute osseous abnormality    Degenerative changes   CT head-No acute intracranial abnormality    Results from last 7 days Lab Units 06/03/21  0503 06/02/21  1702   WBC Thousand/uL 11 92* 12 36*   HEMOGLOBIN g/dL 13 7 15 9   HEMATOCRIT % 41 3 47 8   PLATELETS Thousands/uL 318 379   NEUTROS ABS Thousands/µL 6 51 7 82*         Results from last 7 days   Lab Units 06/03/21  0503 06/02/21  1702   SODIUM mmol/L 137 139   POTASSIUM mmol/L 3 7 4 2   CHLORIDE mmol/L 103 102   CO2 mmol/L 23 27   ANION GAP mmol/L 11 10   BUN mg/dL 6 8   CREATININE mg/dL 0 81 1 15   EGFR ml/min/1 73sq m 89 64   CALCIUM mg/dL 9 0 9 7         Results from last 7 days   Lab Units 06/03/21  1051 06/03/21  0712 06/03/21  0013 06/02/21  2107   POC GLUCOSE mg/dl 243* 206* 203* 247*     Results from last 7 days   Lab Units 06/03/21  0503 06/02/21  1702   GLUCOSE RANDOM mg/dL 178* 314*           Results from last 7 days   Lab Units 06/02/21  1702   TROPONIN I ng/mL <0 02               Results from last 7 days   Lab Units 06/03/21  0107 06/02/21  2110 06/02/21  1828   LACTIC ACID mmol/L 1 3 2 1* 2 2*             Results from last 7 days   Lab Units 06/02/21  1702   NT-PRO BNP pg/mL 24           Results from last 7 days   Lab Units 06/02/21  1727   CLARITY UA  Clear   COLOR UA  Yellow   SPEC GRAV UA  1 015   PH UA  6 5   GLUCOSE UA mg/dl 500 (1/2%)*   KETONES UA mg/dl Negative   BLOOD UA  Large*   PROTEIN UA mg/dl Negative   NITRITE UA  Positive*   BILIRUBIN UA  Negative   UROBILINOGEN UA E U /dl 0 2   LEUKOCYTES UA  Small*   WBC UA /hpf 10-20*   RBC UA /hpf 10-20*   BACTERIA UA /hpf Innumerable*   EPITHELIAL CELLS WET PREP /hpf Occasional           Results from last 7 days   Lab Units 06/02/21  1828 06/02/21  1728   BLOOD CULTURE  Received in Microbiology Lab  Culture in Progress  Received in Microbiology Lab  Culture in Progress           ED Treatment:   Medication Administration from 06/02/2021 1613 to 06/02/2021 1927       Date/Time Order Dose Route Action     06/02/2021 1832 levofloxacin (LEVAQUIN) IVPB (premix in dextrose) 750 mg 150 mL 750 mg Intravenous New Bag Past Medical History:   Diagnosis Date    Acute laryngitis     Acute nonsuppurative otitis media, unspecified laterality     Arm weakness     Arthritis     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HCC)     Bladder infection     Bronchitis     Constipation     Cough     Diabetes mellitus (HCC)     Dizziness     Dysfunction of eustachian tube     Erectile dysfunction of non-organic origin     Fatigue     Glaucoma     Hiatal hernia     Hypertension     Imbalance     Leg muscle spasm     MS (multiple sclerosis) (HCC)     Nephrolithiasis     No natural teeth     Sinus pain     Spinal stenosis     Strain of thoracic region     Stroke [facility]     Suprapubic catheter ([facility] )      Present on Admission:   Urinary tract infection associated with cystostomy catheter ([facility] )   Multiple sclerosis ([facility] )   Hypertension   Diabetes mellitus ([facility] )      Admitting Diagnosis: Multiple sclerosis ([place] [address] ) [G35]  Diabetes ([facility] ) [E11 9]  Weakness [R53 1]  Urinary tract infection [N39 0]  Hyperglycemia [R73 9]  Occasional tremors [R25 1]  Sepsis ([facility] ) [A41 9]  Age/Sex: 69 y o  male  Admission Orders:  Scheduled Medications:  [START ON 6/4/2021] atorvastatin, 80 mg, Oral, Daily With Dinner    And  [START ON 6/4/2021] amLODIPine, 10 mg, Oral, Daily  busPIRone, 15 mg, Oral, Daily  busPIRone, 7 5 mg, Oral, After Dinner  clopidogrel, 75 mg, Oral, Daily  enoxaparin, 40 mg, Subcutaneous, Daily  furosemide, 40 mg, Oral, Daily  gabapentin, 300 mg, Oral, BID  gabapentin, 600 mg, Oral, HS  insulin lispro, 1-6 Units, Subcutaneous, TID AC  insulin lispro, 1-6 Units, Subcutaneous, HS  latanoprost, 1 drop, Both Eyes, HS  levofloxacin, 750 mg, Oral, Q24H  losartan, 50 mg, Oral, Daily  pantoprazole, 40 mg, Oral, BID AC  potassium chloride, 10 mEq, Oral, Daily  propranolol, 60 mg, Oral, Daily  senna-docusate sodium, 2 tablet, Oral, HS  sertraline, 25 mg, Oral, HS      Continuous IV Infusions:     PRN Meds:  acetaminophen, 650 mg, Oral, Q6H PRN  albuterol, 2 puff, Inhalation, Q6H PRN  ALPRAZolam, 0 25 mg, Oral, BID PRN x1 6/3  calcium carbonate, 1,000 mg, Oral, TID PRN x1 6/3  glycerin-hypromellose-, 2 drop, Both Eyes, 4x Daily PRN  meclizine, 12 5 mg, Oral, BID PRN  melatonin, 3 mg, Oral, HS PRN x1 6/2  ondansetron, 4 mg, Intravenous, Q6H PRN x1 6/3  oxyCODONE, 2 5 mg, Oral, Q4H PRN  oxyCODONE, 5 mg, Oral, Q4H PRNx1 6/2, x1 6/3  polyethylene glycol, 17 g, Oral, Daily PRN    poct glucose TID  Level 2 carb diet  SCD's      IP CONSULT TO NEUROLOGY    Network Utilization Review Department  ATTENTION: Please call with any questions or concerns to [phone] and carefully listen to the prompts so that you are directed to the right person  All voicemails are confidential   Shanna Abdi all requests for admission clinical reviews, approved or denied determinations and any other requests to dedicated fax number below belonging to the campus where the patient is receiving treatment   List of dedicated fax numbers for the Facilities:  [address] DENIALS (Administrative/Medical Necessity) [phone]   [address] (Maternity/NICU/Pediatrics) [phone]   [address] [place] [address] [address] [place] [address] [id number] [place] [address] Priscilla Schwartz [address] [facility] [phone]

## 2021-06-03 NOTE — PHYSICAL THERAPY NOTE
Physical Therapy Evaluation      Patient Active Problem List   Diagnosis    Urinary tract infection associated with cystostomy catheter (HCC)    Cellulitis    Diabetic neuropathy ([address]    Depression    Cervical spinal stenosis    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HCC)    Benign colon polyp    Chronic suprapubic catheter ([address] )    Dyslipidemia    Esophageal reflux    Fatty liver    Generalized anxiety disorder    Glaucoma    Hyperlipidemia    Hypertension    Multiple sclerosis ([address] )    Neurogenic bladder    Obstructive sleep apnea    Thyroid nodule    Diabetes mellitus ([address] )    Urinary retention    History of CVA (cerebrovascular accident)    Bladder stones    Bladder neck contracture    Pancreatic mass    Pancreatic lesion    Sepsis ([address] )       Past Medical History:   Diagnosis Date    Acute laryngitis     Acute nonsuppurative otitis media, unspecified laterality     Arm weakness     Arthritis     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HCC)     Bladder infection     Bronchitis     Constipation     Cough     Diabetes mellitus (HCC)     Dizziness     Dysfunction of eustachian tube     Erectile dysfunction of non-organic origin     Fatigue     Glaucoma     Hiatal hernia     Hypertension     Imbalance     Leg muscle spasm     MS (multiple sclerosis) (HCC)     Nephrolithiasis     No natural teeth     Sinus pain     Spinal stenosis     Strain of thoracic region     Stroke ([address]     Suprapubic catheter ([address] )        Past Surgical History:   Procedure Laterality Date    APPENDECTOMY      BRAIN SURGERY      Coil placed in aneurysm    CYSTOSCOPY      CYSTOSCOPY      CYSTOSCOPY  06/11/2018    CYSTOSCOPY  01/15/2021    EYE SURGERY      transscleral cyclophotocoagulation noncontact YAG laser    MYRINGOTOMY      with ventilation tube insertion    OK REMOVE BLADDER STONE,<2 5 CM N/A 5/7/2019    Procedure: CYSTOSCOPY, holmium laser litholapaxy of bladder stones, EXCHANGE OF SP TUBE; Surgeon: Shon Hart MD;  [facility]: [facility];  Service: Urology    SUPRAPUBIC CATHETER INSERTION        06/03/21 1352   PT Last Visit   PT Visit Date 06/03/21   Note Type   Note type Evaluation   Pain Assessment   Pain Assessment Tool 0-10   Pain Score 1   Pain Location/Orientation Location: Groin;Orientation: Right   Hospital Pain Intervention(s) Repositioned; Ambulation/increased activity; Emotional support;Cold applied   Home Living   Type of [facility] One level;Ramped entrance   [address] [place] [address] chair;Grab bars in shower;Grab bars around toilet;Commode   [address]; Wheelchair-manual;Hospital bed;Crutches;Mechanical lift  (quick move type of device )   Prior Function   Level of [place] Needs assistance with IADLs; Needs assistance with ADLs and functional mobility   Lives With Family  (sister and brother)   Receives Help From Family;Personal care attendant  ([facility])   ADL Assistance Needs assistance   IADLs Needs assistance   Falls in the last 6 months 0   Comments pt is w/c bound, uses quick move type of device to transfer with assist of 1 persons  pt indep propelling w/c for short distances  pt does not stand or amb  pt needed assist bathing and dressing  can assist in pulling self up in bed   Restrictions/Precautions   Weight Bearing Precautions Per Order No   Other Precautions Multiple lines; Fall Risk;Pain   General   Family/Caregiver Present No   Cognition   Overall Cognitive Status WFL   Orientation Level Oriented X4   RUE Assessment   RUE Assessment WNL  (reports some shoulder pain)   LUE Assessment   LUE Assessment WNL  (reports some shoulder pain)   RLE Assessment   RLE Assessment X  (groin muscle pain, spasticity  min isol mvmt 3-/5)   LLE Assessment   LLE Assessment X  ( spasticity   min isol mvmt 3+/5, tremor ble)   Coordination   Movements are Fluid and Coordinated 0   Coordination and Movement Description tremor , spasticity   Sensation X   Light Touch RLE Light Touch Impaired   LLE Light Touch Impaired   Proprioception   RLE Proprioception Impaired   LLE Proprioception Impaired   Bed Mobility   Rolling R 4  Minimal assistance   Additional items Assist x 1;Bedrails;LE management   Rolling L 4  Minimal assistance   Additional items Assist x 1;Bedrails;Verbal cues;LE management   Supine to Sit 2  Maximal assistance   Additional items Assist x 1; Increased time required;Verbal cues;LE management; Bedrails;HOB elevated   Sit to Supine 2  Maximal assistance   Additional items Assist x 1;Bedrails; Increased time required;Verbal cues;LE management   Transfers   Sit to Stand Unable to assess   Balance   Static Sitting Fair   Dynamic Sitting Poor   Endurance Deficit   Endurance Deficit Yes   Endurance Deficit Description fatigue   Activity Tolerance   Activity Tolerance Patient tolerated treatment well;Treatment limited secondary to medical complications (Comment)   Nurse Made Aware yes   Assessment   Prognosis Guarded   Problem List Decreased strength;Decreased range of motion;Decreased endurance; Impaired balance;Decreased mobility; Impaired sensation; Impaired tone;Obesity;Pain   Assessment pt admitted with abdominal pain, dx with uti, pt referred to PT  pt lives in 1 story home with sister and brother who assist his care for MS  pt does not amb  uses w/c for short distances  needed quick move type of transfer device with assistance  pt needs assist bathing and dressing  pt demonstrated mobility close to baseline  pt needed min assist for rolling using bedrails  needed max assist for supine to/ from  sitting on eob  pt was able to self support after a few minutes  pt does have marked spasticity ble, little and weak arom ble  pt reporting r groin pain, noted to be tender proximal medial thigh but not directily in groin, suspect muecle trauma from recent stretching  pt will need skilled PT for deficits in strength, balance, rom, coordination, tone, self care, balance   pt will be able to return home if he can transfer using quick move  pt does get PT with senior life as well as other services  recommend continueation of those services  Barriers to Discharge None   Goals   Patient Goals not loose any ground with mobility, sit OOB in chair   STG Expiration Date 06/10/21   Short Term Goal #1 sitting edge of bed for 15 minutes unassisted  bed moiblity with mod assist of 1  OOB to chair using quick move and ue support  indep w/c mobility on level sirfaces for '  improve balance and strength by 1/2 grade  Plan   Treatment/Interventions LE strengthening/ROM; Therapeutic exercise; Endurance training;Bed mobility; Equipment eval/education;Spoke to nursing; Functional transfer training;Patient/family training   PT Frequency   (3-5x/week)   Recommendation   PT Discharge Recommendation Home with outpatient rehabilitation   Equipment Recommended Wheelchair  (transfer device, has both)   PT - OK to Discharge Yes   [address] Mobility Inpatient   Turning in Bed Without Bedrails 3   Lying on Back to Sitting on Edge of Flat Bed 2   Moving Bed to Chair 1   Standing Up From Chair 1   Walk in Room 1   Climb 3-5 Stairs 1   Basic Mobility Inpatient Raw Score 9   Turning Head Towards Sound 4   Follow Simple Instructions 4   Low Function Basic Mobility Raw Score 17   Low Function Basic Mobility Standardized Score 27 46   History: co - morbidities, fall risk, use of assistive device, assist for adl's,  multiple lines  Exam: impairments in locomotion, musculoskeletal, balance,tone,   Clinical: unstable/unpredictable  Complexity:high        Danielamae Garry, PT

## 2021-06-03 NOTE — PLAN OF CARE
Problem: Potential for Falls  Goal: Patient will remain free of falls  Description: INTERVENTIONS:  - Assess patient frequently for physical needs  -  Identify cognitive and physical deficits and behaviors that affect risk of falls    -  [place] fall precautions as indicated by assessment   - Educate patient/family on patient safety including physical limitations  - Instruct patient to call for assistance with activity based on assessment  - Modify environment to reduce risk of injury  - Consider OT/PT consult to assist with strengthening/mobility  Outcome: Progressing     Problem: Prexisting or High Potential for Compromised Skin Integrity  Goal: Skin integrity is maintained or improved  Description: INTERVENTIONS:  - Identify patients at risk for skin breakdown  - Assess and monitor skin integrity  - Assess and monitor nutrition and hydration status  - Monitor labs   - Assess for incontinence   - Turn and reposition patient  - Assist with mobility/ambulation  - Relieve pressure over bony prominences  - Avoid friction and shearing  - Provide appropriate hygiene as needed including keeping skin clean and dry  - Evaluate need for skin moisturizer/barrier cream  - Collaborate with interdisciplinary team   - Patient/family teaching  - Consider wound care consult   Outcome: Progressing     Problem: PAIN - ADULT  Goal: Verbalizes/displays adequate comfort level or baseline comfort level  Description: Interventions:  - Encourage patient to monitor pain and request assistance  - Assess pain using appropriate pain scale  - Administer analgesics based on type and severity of pain and evaluate response  - Implement non-pharmacological measures as appropriate and evaluate response  - Consider cultural and social influences on pain and pain management  - Notify physician/advanced practitioner if interventions unsuccessful or patient reports new pain  Outcome: Progressing     Problem: INFECTION - ADULT  Goal: Absence or prevention of progression during hospitalization  Description: INTERVENTIONS:  - Assess and monitor for signs and symptoms of infection  - Monitor lab/diagnostic results  - Monitor all insertion sites, i e  indwelling lines, tubes, and drains  - Monitor endotracheal if appropriate and nasal secretions for changes in amount and color  - [place] appropriate cooling/warming therapies per order  - Administer medications as ordered  - Instruct and encourage patient and family to use good hand hygiene technique  - Identify and instruct in appropriate isolation precautions for identified infection/condition  Outcome: Progressing  Goal: Absence of fever/infection during neutropenic period  Description: INTERVENTIONS:  - Monitor WBC    Outcome: Progressing     Problem: SAFETY ADULT  Goal: Patient will remain free of falls  Description: INTERVENTIONS:  - Assess patient frequently for physical needs  -  Identify cognitive and physical deficits and behaviors that affect risk of falls    -  [place] fall precautions as indicated by assessment   - Educate patient/family on patient safety including physical limitations  - Instruct patient to call for assistance with activity based on assessment  - Modify environment to reduce risk of injury  - Consider OT/PT consult to assist with strengthening/mobility  Outcome: Progressing  Goal: Maintain or return to baseline ADL function  Description: INTERVENTIONS:  -  Assess patient's ability to carry out ADLs; assess patient's baseline for ADL function and identify physical deficits which impact ability to perform ADLs (bathing, care of mouth/teeth, toileting, grooming, dressing, etc )  - Assess/evaluate cause of self-care deficits   - Assess range of motion  - Assess patient's mobility; develop plan if impaired  - Assess patient's need for assistive devices and provide as appropriate  - Encourage maximum independence but intervene and supervise when necessary  - Involve family in performance of ADLs  - Assess for home care needs following discharge   - Consider OT consult to assist with ADL evaluation and planning for discharge  - Provide patient education as appropriate  Outcome: Progressing  Goal: Maintain or return mobility status to optimal level  Description: INTERVENTIONS:  - Assess patient's baseline mobility status (ambulation, transfers, stairs, etc )    - Identify cognitive and physical deficits and behaviors that affect mobility  - Identify mobility aids required to assist with transfers and/or ambulation (gait belt, sit-to-stand, lift, walker, cane, etc )  - [place] fall precautions as indicated by assessment  - Record patient progress and toleration of activity level on Mobility SBAR; progress patient to next Phase/Stage  - Instruct patient to call for assistance with activity based on assessment  - Consider rehabilitation consult to assist with strengthening/weightbearing, etc   Outcome: Progressing     Problem: DISCHARGE PLANNING  Goal: Discharge to home or other facility with appropriate resources  Description: INTERVENTIONS:  - Identify barriers to discharge w/patient and caregiver  - Arrange for needed discharge resources and transportation as appropriate  - Identify discharge learning needs (meds, wound care, etc )  - Arrange for interpretive services to assist at discharge as needed  - Refer to Case Management Department for coordinating discharge planning if the patient needs post-hospital services based on physician/advanced practitioner order or complex needs related to functional status, cognitive ability, or social support system  Outcome: Progressing     Problem: Knowledge Deficit  Goal: Patient/family/caregiver demonstrates understanding of disease process, treatment plan, medications, and discharge instructions  Description: Complete learning assessment and assess knowledge base    Interventions:  - Provide teaching at level of understanding  - Provide teaching via preferred learning methods  Outcome: Progressing

## 2021-06-03 NOTE — H&P
[address]  H&P- Kanchan Steve 1950, 69 y o  male MRN: [id number]  Unit/Bed#: E5 -01 Encounter: [id number]  Primary Care Provider: Kerri Marx DO   Date and time admitted to hospital: 6/2/2021  4:13 PM    * Sepsis [facility])  Assessment & Plan  · Patient met sepsis criteria time of admission with tachycardia, leukocytosis and lactic acidosis  · Source likely urinary given abnormal UA  · Continue IV Levaquin  · Follow-up urine and blood cultures    History of CVA (cerebrovascular accident)  Assessment & Plan  · History of CVA in October 2018  · Continue Plavix and Lipitor    Diabetes mellitus ([facility] )  Assessment & Plan  Lab Results   Component Value Date    HGBA1C 9 6 (H) 08/07/2020       No results for input(s): POCGLU in the last 72 hours  Blood Sugar Average: Last 72 hrs:  · Maintained outpatient on metformin and Trulicity  · Will hold oral medications and start sliding scale insulin    Multiple sclerosis (HCC)  Assessment & Plan  · Patient with history of longstanding MS  · Chronic lower extremity weakness, wheelchair-bound  · Reports worsening neuropathic pain, currently taking gabapentin 300 mg b i d  And 600 mg HS   · Patient notes significant episode of weakness this morning along bilateral tremors and mental fogginess  · CT head negative   · Neurology consult    Hypertension  Assessment & Plan  · BP elevated at time of presentation  · Continue Lasix, losartan and propranolol    Urinary tract infection associated with cystostomy catheter ([facility] )  Assessment & Plan  · Patient presented hospital with generalized weakness  · UA evidence of UTI  · History of neurogenic bladder due to MS with suprapubic catheter  · Started on Levaquin given allergy to Keflex, continue  · Follow-up urine culture    VTE Prophylaxis: Enoxaparin (Lovenox)  / sequential compression device   Code Status:  Full code  POLST: POLST form is not discussed and not completed at this time    Discussion with family:  None    Anticipated Length of Stay:  Patient will be admitted on an Inpatient basis with an anticipated length of stay of  more than 2 midnights  Justification for Hospital Stay:  Sepsis due to UTI    Total Time for Visit, including Counseling / Coordination of Care: 60 minutes  Greater than 50% of this total time spent on direct patient counseling and coordination of care  Chief Complaint:   Weakness    History of Present Illness:    Fanny Bell is a 69 y o  male with past medical history of CVA, suprapubic catheter, MS, hypertension, glaucoma, diabetes who presents with weakness  Patient reports throughout the day today has been unable to get out of bed due to weakness  Notes he typically can use his arms and pull himself into his wheelchair but reports he was too weak to do this today  Patient also notes associated mental fogginess and decreased concentration  Does report he typically has mild tremors but reports these were significantly worsened today  Does note he was treated with p o  Antibiotics for UTI approximately 3 days ago  Does have chronic suprapubic catheter  Also reports right groin pain which worsened today  Notes both muscle cramps and sharp pain  Has not taken anything for the pain, but describes it as severe  Review of Systems:    Review of Systems   Constitutional: Negative for chills and fever  HENT: Negative for trouble swallowing  Eyes: Negative for visual disturbance  Respiratory: Negative for cough and shortness of breath  Cardiovascular: Negative for chest pain  Gastrointestinal: Negative for abdominal pain, nausea and vomiting  Genitourinary: Negative for flank pain  Musculoskeletal: Positive for myalgias  Negative for gait problem  Skin: Negative for rash  Neurological: Positive for tremors and weakness  Psychiatric/Behavioral: Positive for decreased concentration  Negative for confusion         Past Medical and Surgical History: Past Medical History:   Diagnosis Date    Acute laryngitis     Acute nonsuppurative otitis media, unspecified laterality     Arm weakness     Arthritis     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HCC)     Bladder infection     Bronchitis     Constipation     Cough     Diabetes mellitus (HCC)     Dizziness     Dysfunction of eustachian tube     Erectile dysfunction of non-organic origin     Fatigue     Glaucoma     Hiatal hernia     Hypertension     Imbalance     Leg muscle spasm     MS (multiple sclerosis) (HCC)     Nephrolithiasis     No natural teeth     Sinus pain     Spinal stenosis     Strain of thoracic region     Stroke ([address]     Suprapubic catheter ([address] )        Past Surgical History:   Procedure Laterality Date    APPENDECTOMY      BRAIN SURGERY      Coil placed in aneurysm    CYSTOSCOPY      CYSTOSCOPY      CYSTOSCOPY  06/11/2018    CYSTOSCOPY  01/15/2021    EYE SURGERY      transscleral cyclophotocoagulation noncontact YAG laser    MYRINGOTOMY      with ventilation tube insertion    CT REMOVE BLADDER STONE,<2 5 CM N/A 5/7/2019    Procedure: CYSTOSCOPY, holmium laser litholapaxy of bladder stones, EXCHANGE OF SP TUBE;  Surgeon: Ashley Barron MD;  [facility]: [facility];  Service: Urology    SUPRAPUBIC CATHETER INSERTION         Meds/Allergies:    Prior to Admission medications    Medication Sig Start Date End Date Taking?  Authorizing Provider   Acetaminophen (Mapap) 500 MG Take 500 mg by mouth every 6 (six) hours as needed for mild pain or moderate pain   Yes Historical Provider, MD   albuterol (2 5 mg/3 mL) 0 083 % nebulizer solution Take 2 5 mg by nebulization every 6 (six) hours as needed for wheezing or shortness of breath   Yes Historical Provider, MD   albuterol (PROVENTIL HFA,VENTOLIN HFA) 90 mcg/act inhaler Inhale 2 puffs every 6 (six) hours as needed for wheezing   Yes Historical Provider, MD   ALPRAZolam (XANAX) 0 25 mg tablet Take 0 25 mg by mouth 2 (two) times a day as needed for anxiety or sleep    Yes Historical Provider, MD   amLODIPine-atorvastatin (CADUET) 10-80 MG per tablet Take 1 tablet by mouth daily   Yes Historical Provider, MD   busPIRone (BUSPAR) 7 5 mg tablet Take 7 5 mg by mouth see administration instructions Take 2 tablets (15 mg) every morning; Take 1 tablet (7 5 mg) every evening   Yes Historical Provider, MD   clopidogrel (PLAVIX) 75 mg tablet Take 1 tablet (75 mg total) by mouth daily 10/27/18  Yes Tali Roman MD   dextran 70-hypromellose (GenTeal Tears) 0 1-0 3 % ophthalmic solution Administer 2 drops to both eyes 4 (four) times a day as needed   Yes Historical Provider, MD   Dextromethorphan-guaiFENesin (Mucinex DM Maximum Strength)  MG TB12 Take 1 tablet by mouth 2 (two) times a day as needed   Yes Historical Provider, MD   Dulaglutide (Trulicity) 8 72 NC/9 3VS SOPN Inject 0 75 mg under the skin once a week   Yes Historical Provider, MD   Ergocalciferol (VITAMIN D2 PO) Take 50,000 Units by mouth once a week   Yes Historical Provider, MD   furosemide (LASIX) 40 mg tablet Take 40 mg by mouth daily   Yes Historical Provider, MD   latanoprost (XALATAN) 0 005 % ophthalmic solution Administer 1 drop to both eyes daily at bedtime   Yes Historical Provider, MD   Lidocaine 4 % PTCH Apply 1 patch topically daily Leave in place for 12 hours   Yes Historical Provider, MD   losartan (COZAAR) 50 mg tablet Take 50 mg by mouth daily     Yes Historical Provider, MD   meclizine (ANTIVERT) 12 5 MG tablet Take 12 5 mg by mouth 2 (two) times a day as needed for dizziness   Yes Historical Provider, MD   melatonin 3 mg Take 3 mg by mouth daily at bedtime as needed   Yes Historical Provider, MD   Menthol, Topical Analgesic, (Biofreeze) 4 % GEL Apply 1 application topically 4 (four) times a day as needed   Yes Historical Provider, MD   metFORMIN (GLUCOPHAGE) 1000 MG tablet Take 1,000 mg by mouth 2 (two) times a day   Yes Historical Provider, MD   pantoprazole (PROTONIX) 40 mg tablet TAKE 1 TABLET TWICE DAILY 30 MINUTES BEFORE BREAKFAST AND DINNER  3/13/18  Yes Kezia Alexandreta, DO   polyethylene glycol (MIRALAX) 17 g packet Take 17 g by mouth daily as needed   Yes Historical Provider, MD   potassium chloride (K-DUR,KLOR-CON) 10 mEq tablet Take 1 tablet (10 mEq total) by mouth daily 10/27/18  Yes Willis Clemons MD   propranolol (INDERAL LA) 60 mg 24 hr capsule Take 60 mg by mouth daily   Yes Historical Provider, MD   senna-docusate sodium (Senokot S) 8 6-50 mg per tablet Take 2 tablets by mouth daily at bedtime    Yes Historical Provider, MD   sertraline (ZOLOFT) 25 mg tablet Take 25 mg by mouth daily at bedtime    Yes Historical Provider, MD   amLODIPine (NORVASC) 10 mg tablet Take 10 mg by mouth daily  6/2/21  Historical Provider, MD   atorvastatin (LIPITOR) 20 mg tablet Take 20 mg by mouth daily at bedtime  6/2/21  Historical Provider, MD   baclofen 10 mg tablet Take 2 5 mg by mouth 2 (two) times a day Takes 1 tablet in am and 1 tablet @ 2pm  6/2/21  Historical Provider, MD   brimonidine-timolol (COMBIGAN) 0 2-0 5 % Administer 1 drop to both eyes every 12 (twelve) hours  6/2/21  Historical Provider, MD   busPIRone (BUSPAR) 10 mg tablet Take 10 mg by mouth 3 (three) times a day  6/2/21  Historical Provider, MD   dextromethorphan-guaifenesin ([address] DM)  MG per 12 hr tablet Take 1 tablet by mouth every 12 (twelve) hours as needed for cough 5/15/20 6/2/21  Javier Dhillon PA-C   furosemide (LASIX) 20 mg tablet Take 40 mg by mouth daily    6/2/21  Historical Provider, MD   gabapentin (NEURONTIN) 300 mg capsule Take 1 capsule (300 mg total) by mouth 2 (two) times a day AND 2 capsules (600 mg total) daily at bedtime    Patient taking differently: 1 cap tid 4/15/19 6/2/21  Ivania Tran MD   linaGLIPtin 5 MG TABS Take 5 mg by mouth daily  6/2/21  Historical Provider, MD   metFORMIN (GLUCOPHAGE) 500 mg tablet Take 1,000 mg by mouth 2 (two) times a day with meals    6/2/21  Historical Provider, MD   propranolol (INDERAL LA) 60 mg 24 hr capsule TAKE 1 CAPSULE DAILY 4/2/18 6/2/21  Octavia Welch DO   sitaGLIPtin (JANUVIA) 100 mg tablet Take 100 mg by mouth daily  6/2/21  Historical Provider, MD   sucralfate (CARAFATE) 1 g tablet Take 1 tablet (1 g total) by mouth 2 (two) times a day  Patient not taking: Reported on 8/7/2020 10/26/18 6/2/21  Rimma Gomes MD     I have reviewed home medications with patient personally  Allergies: Allergies   Allergen Reactions    Cephalexin Rash       Social History:     Marital Status: Single   Occupation:  Unknown  Patient Pre-hospital Living Situation:  Home  Patient Pre-hospital Level of Mobility:  Wheelchair-bound  Patient Pre-hospital Diet Restrictions:  Diabetic  Substance Use History:   Social History     Substance and Sexual Activity   Alcohol Use Not Currently     Social History     Tobacco Use   Smoking Status Former Smoker    Types: Cigarettes   Smokeless Tobacco Never Used   Tobacco Comment    smoked for 15-20 years, stopped about 25 years ago     Social History     Substance and Sexual Activity   Drug Use No       Family History:    Family History   Problem Relation Age of Onset    Heart attack Mother     Stroke Mother     Heart attack Father     Anuerysm Father         In Stomach        Physical Exam:     Vitals:   Blood Pressure: 162/80 (06/02/21 1938)  Pulse: (!) 107 (06/02/21 1938)  Temperature: 98 2 °F (36 8 °C) (06/02/21 1938)  Temp Source: Oral (06/02/21 1637)  Respirations: 18 (06/02/21 1938)  Weight - Scale: 83 5 kg (184 lb 1 4 oz) (06/02/21 1616)  SpO2: 98 % (06/02/21 1938)    Physical Exam  Vitals signs reviewed  Constitutional:       General: He is not in acute distress  HENT:      Head: Normocephalic and atraumatic  Eyes:      General: No scleral icterus  Conjunctiva/sclera: Conjunctivae normal    Neck:      Musculoskeletal: Neck supple     Cardiovascular:      Rate and Rhythm: Regular rhythm  Tachycardia present  Heart sounds: No murmur  Pulmonary:      Effort: Pulmonary effort is normal  No respiratory distress  Breath sounds: Normal breath sounds  Abdominal:      General: Bowel sounds are normal  There is no distension  Palpations: Abdomen is soft  Tenderness: There is no abdominal tenderness  Musculoskeletal:      Right lower leg: No edema  Left lower leg: No edema  Skin:     General: Skin is warm and dry  Neurological:      Mental Status: He is alert and oriented to person, place, and time  Motor: Weakness (chronic LE bilaterally) present  Psychiatric:         Mood and Affect: Mood normal          Behavior: Behavior normal            Additional Data:     Lab Results: I have personally reviewed pertinent reports  Results from last 7 days   Lab Units 06/02/21  1702   WBC Thousand/uL 12 36*   HEMOGLOBIN g/dL 15 9   HEMATOCRIT % 47 8   PLATELETS Thousands/uL 379   NEUTROS PCT % 64   LYMPHS PCT % 24   MONOS PCT % 7   EOS PCT % 4     Results from last 7 days   Lab Units 06/02/21  1702   SODIUM mmol/L 139   POTASSIUM mmol/L 4 2   CHLORIDE mmol/L 102   CO2 mmol/L 27   BUN mg/dL 8   CREATININE mg/dL 1 15   ANION GAP mmol/L 10   CALCIUM mg/dL 9 7   GLUCOSE RANDOM mg/dL 314*                 Results from last 7 days   Lab Units 06/02/21  1828   LACTIC ACID mmol/L 2 2*       Imaging: I have personally reviewed pertinent reports  CT head without contrast   Final Result by Blair Whyte MD (06/02 1824)      No acute intracranial abnormality                    Workstation performed: [id number]         XR chest 2 views   ED Interpretation by Rima Brewer MD (06/02 1814)   Primary reviewed: No acute abnormality      XR hip/pelv 2-3 vws right   ED Interpretation by Rima Brewer MD (06/02 1814)   Primary reviewed: No acute abnormality          EKG, Pathology, and Other Studies Reviewed on Admission:   · EKG: sinus tach     Allscripts / Epic Records Reviewed: Yes     ** Please Note: This note has been constructed using a voice recognition system   **

## 2021-06-03 NOTE — ASSESSMENT & PLAN NOTE
Lab Results   Component Value Date    HGBA1C 9 6 (H) 08/07/2020       Recent Labs     06/02/21  2107 06/03/21  0013 06/03/21  0712 06/03/21  1051   POCGLU 247* 203* 206* 243*       Blood Sugar Average: Last 72 hrs:  · (P) 224  75Maintained outpatient on metformin and Trulicity  · Resume home medications

## 2021-06-03 NOTE — PLAN OF CARE
Problem: Potential for Falls  Goal: Patient will remain free of falls  Description: INTERVENTIONS:  - Assess patient frequently for physical needs  -  Identify cognitive and physical deficits and behaviors that affect risk of falls    -  [place] fall precautions as indicated by assessment   - Educate patient/family on patient safety including physical limitations  - Instruct patient to call for assistance with activity based on assessment  - Modify environment to reduce risk of injury  - Consider OT/PT consult to assist with strengthening/mobility  Outcome: Progressing     Problem: Prexisting or High Potential for Compromised Skin Integrity  Goal: Skin integrity is maintained or improved  Description: INTERVENTIONS:  - Identify patients at risk for skin breakdown  - Assess and monitor skin integrity  - Assess and monitor nutrition and hydration status  - Monitor labs   - Assess for incontinence   - Turn and reposition patient  - Assist with mobility/ambulation  - Relieve pressure over bony prominences  - Avoid friction and shearing  - Provide appropriate hygiene as needed including keeping skin clean and dry  - Evaluate need for skin moisturizer/barrier cream  - Collaborate with interdisciplinary team   - Patient/family teaching  - Consider wound care consult   Outcome: Progressing     Problem: PAIN - ADULT  Goal: Verbalizes/displays adequate comfort level or baseline comfort level  Description: Interventions:  - Encourage patient to monitor pain and request assistance  - Assess pain using appropriate pain scale  - Administer analgesics based on type and severity of pain and evaluate response  - Implement non-pharmacological measures as appropriate and evaluate response  - Consider cultural and social influences on pain and pain management  - Notify physician/advanced practitioner if interventions unsuccessful or patient reports new pain  Outcome: Progressing     Problem: INFECTION - ADULT  Goal: Absence or prevention of progression during hospitalization  Description: INTERVENTIONS:  - Assess and monitor for signs and symptoms of infection  - Monitor lab/diagnostic results  - Monitor all insertion sites, i e  indwelling lines, tubes, and drains  - Monitor endotracheal if appropriate and nasal secretions for changes in amount and color  - [place] appropriate cooling/warming therapies per order  - Administer medications as ordered  - Instruct and encourage patient and family to use good hand hygiene technique  - Identify and instruct in appropriate isolation precautions for identified infection/condition  Outcome: Progressing  Goal: Absence of fever/infection during neutropenic period  Description: INTERVENTIONS:  - Monitor WBC    Outcome: Progressing     Problem: SAFETY ADULT  Goal: Patient will remain free of falls  Description: INTERVENTIONS:  - Assess patient frequently for physical needs  -  Identify cognitive and physical deficits and behaviors that affect risk of falls    -  [place] fall precautions as indicated by assessment   - Educate patient/family on patient safety including physical limitations  - Instruct patient to call for assistance with activity based on assessment  - Modify environment to reduce risk of injury  - Consider OT/PT consult to assist with strengthening/mobility  Outcome: Progressing  Goal: Maintain or return to baseline ADL function  Description: INTERVENTIONS:  -  Assess patient's ability to carry out ADLs; assess patient's baseline for ADL function and identify physical deficits which impact ability to perform ADLs (bathing, care of mouth/teeth, toileting, grooming, dressing, etc )  - Assess/evaluate cause of self-care deficits   - Assess range of motion  - Assess patient's mobility; develop plan if impaired  - Assess patient's need for assistive devices and provide as appropriate  - Encourage maximum independence but intervene and supervise when necessary  - Involve family in performance of ADLs  - Assess for home care needs following discharge   - Consider OT consult to assist with ADL evaluation and planning for discharge  - Provide patient education as appropriate  Outcome: Progressing  Goal: Maintain or return mobility status to optimal level  Description: INTERVENTIONS:  - Assess patient's baseline mobility status (ambulation, transfers, stairs, etc )    - Identify cognitive and physical deficits and behaviors that affect mobility  - Identify mobility aids required to assist with transfers and/or ambulation (gait belt, sit-to-stand, lift, walker, cane, etc )  - [place] fall precautions as indicated by assessment  - Record patient progress and toleration of activity level on Mobility SBAR; progress patient to next Phase/Stage  - Instruct patient to call for assistance with activity based on assessment  - Consider rehabilitation consult to assist with strengthening/weightbearing, etc   Outcome: Progressing     Problem: DISCHARGE PLANNING  Goal: Discharge to home or other facility with appropriate resources  Description: INTERVENTIONS:  - Identify barriers to discharge w/patient and caregiver  - Arrange for needed discharge resources and transportation as appropriate  - Identify discharge learning needs (meds, wound care, etc )  - Arrange for interpretive services to assist at discharge as needed  - Refer to Case Management Department for coordinating discharge planning if the patient needs post-hospital services based on physician/advanced practitioner order or complex needs related to functional status, cognitive ability, or social support system  Outcome: Progressing     Problem: Knowledge Deficit  Goal: Patient/family/caregiver demonstrates understanding of disease process, treatment plan, medications, and discharge instructions  Description: Complete learning assessment and assess knowledge base    Interventions:  - Provide teaching at level of understanding  - Provide teaching via preferred learning methods  Outcome: Progressing

## 2021-06-03 NOTE — ASSESSMENT & PLAN NOTE
· Patient presented hospital with generalized weakness  · UA evidence of UTI  · History of neurogenic bladder due to MS with suprapubic catheter  · Started on Levaquin given allergy to Keflex, continue  · Follow-up urine culture

## 2021-06-03 NOTE — DISCHARGE SUMMARY
[address] [facility]  Discharge- Kanchan Steve 1950, 69 y o  male MRN: [id number]  Unit/Bed#: E5 -01 Encounter: [id number]  Primary Care Provider: Kerri Marx DO   Date and time admitted to hospital: 6/2/2021  4:13 PM    Diabetes mellitus [facility])  Assessment & Plan  Lab Results   Component Value Date    HGBA1C 9 6 (H) 08/07/2020       Recent Labs     06/02/21  2107 06/03/21  0013 06/03/21  0712 06/03/21  1051   POCGLU 247* 203* 206* 243*       Blood Sugar Average: Last 72 hrs:  · (P) 224  75Maintained outpatient on metformin and Trulicity  · Resume home medications    Multiple sclerosis ([facility] [address] )  Assessment & Plan  · Not thought to be an acute flare  Neurology added Neurontin and felt he was okay to go home    Urinary tract infection associated with cystostomy catheter ([facility] [address] )  Assessment & Plan  · Hold off on antibiotics at this point  Likely colonized      Discharging Physician / Practitioner: Julio Prieto DO  PCP: eKrri Marx DO  Admission Date:   Admission Orders (From admission, onward)     Ordered        06/02/21 1838  Inpatient Admission  Once                   Discharge Date: 06/03/21    Resolved Problems  Date Reviewed: 6/2/2021    None            Consultations During Hospital Stay:  · Neurology  ·       Reason for Admission:  Weakness      Hospital Course:     Kanchan Steve is a 69 y o  male patient who originally presented to the hospital on 6/2/2021 due to weakness  He has underlying multiple sclerosis  There is concern this could be UTI with sepsis versus a multiple sclerosis flare  He was admitted to the hospital   I think likely than urine is colonized and not a true infection  So would stop antibiotics  He was seen by Neurology  They do not feel this is a MS flare  They do recommending adding Neurontin to see if that will help  I have provided him with a prescription for this  He is okay to go home      Please see above list of diagnoses and related plan for additional information  Condition at Discharge: good       Discharge Day Visit / Exam:     Subjective:  Feels better  No complaints  No fever        Vitals: Blood Pressure: 121/89 (06/03/21 0711)  Pulse: (!) 106 (06/03/21 0711)  Temperature: (!) 97 4 °F (36 3 °C) (06/03/21 0711)  Temp Source: Oral (06/02/21 1637)  Respirations: 17 (06/03/21 0711)  Weight - Scale: 83 5 kg (184 lb 1 4 oz) (06/02/21 1616)  SpO2: 93 % (06/03/21 0711)    Exam:     Physical Exam  Vitals signs and nursing note reviewed  HENT:      Head: Normocephalic and atraumatic  Eyes:      Pupils: Pupils are equal, round, and reactive to light  Cardiovascular:      Rate and Rhythm: Normal rate and regular rhythm  Heart sounds: No murmur  No friction rub  No gallop  Pulmonary:      Effort: Pulmonary effort is normal       Breath sounds: Normal breath sounds  No wheezing or rales  Abdominal:      General: Bowel sounds are normal       Palpations: Abdomen is soft  Tenderness: There is no abdominal tenderness  Musculoskeletal:      Right lower leg: No edema  Left lower leg: No edema            Discharge instructions/Information to patient and family:   See after visit summary for information provided to patient and family  Provisions for Follow-Up Care:  See after visit summary for information related to follow-up care and any pertinent home health orders  Disposition:     Home       Discharge Statement:  I spent 37 minutes discharging the patient  This time was spent on the day of discharge  I had direct contact with the patient on the day of discharge  Greater than 50% of the total time was spent examining patient, answering all patient questions, arranging and discussing plan of care with patient as well as directly providing post-discharge instructions  Additional time then spent on discharge activities      Discharge Medications:  See after visit summary for reconciled discharge medications provided to patient and family        ** Please Note: This note has been constructed using a voice recognition system **

## 2021-06-03 NOTE — ASSESSMENT & PLAN NOTE
· Patient with history of longstanding MS  · Chronic lower extremity weakness, wheelchair-bound  · Reports worsening neuropathic pain, currently taking gabapentin 300 mg b i d   And 600 mg HS   · Patient notes significant episode of weakness this morning along bilateral tremors and mental fogginess  · CT head negative   · Neurology consult

## 2021-06-04 LAB — BACTERIA UR CULT: ABNORMAL

## 2021-06-04 NOTE — UTILIZATION REVIEW
Notification of Discharge   This is a Notification of Discharge from our facility [address]  Please be advised that this patient has been discharge from our facility  Below you will find the admission and discharge date and time including the patients disposition  UTILIZATION REVIEW CONTACT:  Noreen Mnia  Utilization   Network Utilization Review Department  Phone: [phone] x carefully listen to the prompts  All voicemails are confidential   Email: [email or web address]     PHYSICIAN ADVISORY SERVICES:  FOR BKJA-LD-KAXQ REVIEW - MEDICAL NECESSITY DENIAL  Phone: [phone]  Fax: [phone]  Email: [email or web address]     PRESENTATION DATE: 6/2/2021  4:13 PM  OBERVATION ADMISSION DATE:   INPATIENT ADMISSION DATE: 6/2/21 1838   DISCHARGE DATE: 6/3/2021  8:30 PM  DISPOSITION: Home/Self Care Home/Self Care      IMPORTANT INFORMATION:  Send all requests for admission clinical reviews, approved or denied determinations and any other requests to dedicated fax number below belonging to the campus where the patient is receiving treatment   List of dedicated fax numbers:  [address] DENIALS (Administrative/Medical Necessity) [phone]   [address] (Maternity/NICU/Pediatrics) [phone]   Genie Multani [phone]   Reji Shen [phone]   Tray Davi [phone]   [address] [phone]   [facility]  [phone]   [address] [facility]  [address] [facility] [address] [facility] [phone]

## 2021-06-04 NOTE — NURSING NOTE
2030 EMS at bedside to transport patient to [facility]  Supra pubic catheter capped, Left peripheral IV removed  Per EMS transport t, AVS supplied was not sufficient, states that they need a medical history for the patient as they transfer patient to [facility]  Nursing supervisor contacted  Advised to print and H&P and seal the information, follow up with social work in am may be necessary  EMS states that they are willing as AVS printed again with the same information including allergies  Patient stable upon transport

## 2021-06-07 LAB
BACTERIA BLD CULT: NORMAL
BACTERIA BLD CULT: NORMAL

## 2021-06-11 ENCOUNTER — TELEPHONE (OUTPATIENT)
Dept: NEUROLOGY | Facility: CLINIC | Age: 71
End: 2021-06-11

## 2021-06-11 NOTE — TELEPHONE ENCOUNTER
1st Attempt  Spoke with patient who said to call [facility] at [phone] to setup his hospital follow up appointment  Spoke with Vincent Smallwood and scheduled patient for 07/01 with Terrell President in the [facility] office  No need to mail paperwork since patient is established with practice  SLA/MS/[facility]    Notes from chart:  Salomón Hardwickkathy will need follow up in in 6 weeks with multiple sclerosis attending or advance practitioner  He will not require outpatient neurological testing

## 2021-07-21 ENCOUNTER — TELEPHONE (OUTPATIENT)
Dept: NEUROLOGY | Facility: CLINIC | Age: 71
End: 2021-07-21

## 2021-07-21 ENCOUNTER — OFFICE VISIT (OUTPATIENT)
Dept: NEUROLOGY | Facility: CLINIC | Age: 71
End: 2021-07-21
Payer: MEDICARE

## 2021-07-21 VITALS
RESPIRATION RATE: 16 BRPM | SYSTOLIC BLOOD PRESSURE: 131 MMHG | TEMPERATURE: 98.8 F | DIASTOLIC BLOOD PRESSURE: 62 MMHG | HEART RATE: 86 BPM

## 2021-07-21 DIAGNOSIS — G35 MULTIPLE SCLEROSIS (HCC): Primary | ICD-10-CM

## 2021-07-21 DIAGNOSIS — Z86.73 HISTORY OF CVA (CEREBROVASCULAR ACCIDENT): ICD-10-CM

## 2021-07-21 DIAGNOSIS — I72.5 ANEURYSM OF BASILAR ARTERY (HCC): ICD-10-CM

## 2021-07-21 DIAGNOSIS — S09.90XA INJURY OF HEAD, INITIAL ENCOUNTER: ICD-10-CM

## 2021-07-21 PROCEDURE — 99214 OFFICE O/P EST MOD 30 MIN: CPT | Performed by: PHYSICIAN ASSISTANT

## 2021-07-21 RX ORDER — POTASSIUM CHLORIDE 750 MG/1
10 TABLET, FILM COATED, EXTENDED RELEASE ORAL 2 TIMES DAILY
COMMUNITY

## 2021-07-21 NOTE — ASSESSMENT & PLAN NOTE
Patient notes he fell out of bed about a week ago and "whacked his head" in the left frontal area  He is having some ongoing dizziness / "wooziness"  He denies any headaches, no change on his exam   However, given use of Plavix, I will order a CT of the head to ensure no bleed

## 2021-07-21 NOTE — PROGRESS NOTES
Patient ID: Elizabeth Smith is a 69 y o  male  Assessment/Plan:    Multiple sclerosis ([facility] [address] )  Patient with very longstanding MS, not currently on a disease modifying agent  He is essentially wheelchair-bound, can assist transfers  He has not had any new MS symptoms  He was recently hospitalized in early June for a UTI and had worsening of his baseline weakness  He was not given steroids due to this was likely a pseudo flare caused by his UTI  His neurologic exam is stable today  History of CVA (cerebrovascular accident)  Patient with left-sided lacunar infarct in 2018  He is maintained on Plavix 75 mg daily and atorvastatin 80 mg daily (a component in Caduet, along with amlodipine)  I was able to speak directly with his PCP at [facility] today  She reports that he gets labs done regularly, last A1c was 8 7 in May 2021, last  in February 2021  His blood pressure is checked regularly by the nurses at [facility]  Discussed with patient that he should maintain a heart healthy diet and keep good control of his cardiovascular risk factors under the direction of his PCP  Reviewed signs and symptoms of stroke with him today  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HCC)  S/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 in March 2015 by Dr Kory Felix  He has been following with Dr Juli Frost and imaging completed in 2020 was stable  He has now been told that he does not need routine follow-up of this any longer unless any concerns  Discussed optimal BP management  Head injury  Patient notes he fell out of bed about a week ago and "whacked his head" in the left frontal area  He is having some ongoing dizziness / "wooziness"  He denies any headaches, no change on his exam   However, given use of Plavix, I will order a CT of the head to ensure no bleed  Patient will follow-up in 6 months or sooner if needed    He was advised to call the office for any new or worsening symptoms  Diagnoses and all orders for this visit:    Multiple sclerosis ([facility] [address] )  -     Ambulatory referral to Neurology    History of CVA (cerebrovascular accident)    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[facility] [address] )    Injury of head, initial encounter  -     CT head wo contrast; Future    Other orders  -     potassium chloride (Klor-Con) 10 mEq tablet; Take 10 mEq by mouth 2 (two) times a day           Subjective:    EVAN Edmond is a 69year old male who presents for follow up  He was last seen in February 2021  He is following up today from a recent hospitalization  To review, patient diagnosed with probable MS in the 1960s  Actually unclear diagnosis from time of presentation to our center in 2014  Patient recalls at age of 11 having chest pain and then numbness of the left toes tingling up the leg to mid chest around T6 and then down the right side in similar distribution to the right foot  Patient had loss of control of bowel and bladder at that time  He had no ability to walk and was in the hospital for over 6 months  He was only able to ambulate with crutches in his 20s, 30s, 40s, etc  Patient still uses [place] crutches as well as a wheelchair to ambulate  Patient was only ever given ACTH in the past  He was never on IMD meds prior to coming to our center in 2014  He was told he may have had a CVA that affected his walking  Patient with suprapubic catheter placed by Dr Norman Kulkarni  Labs unremarkable, NMO negative  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  MRI c-spine did not reveal any lesions  MRI t-spine reveals cord lesion from T3-5; no comparison on file  Patient with longstanding dizziness; he has strong family history of brain aneurysms- his sister had 3 aneurysms, 1 niece with 3 aneurysms, 1 niece who passed away from aneurysm and 1 nephew with aneurysm; patient's father with aneurysm in his abdominal area   He had CTA in January 2015, 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  He was seen neuro-surg and underwent 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 in March 2015 by Dr Ana Neil of neuro-surg  He now follows with Dr Evaristo Townsend for monitoring of the aneurysm  Patient was hospitalized in October 2018 due to RUE weakness and worsened RLE weakness (weak at baseline due to [address])  NIHSS 7  He was not given tPA due to concern this was MS exacerbation  CTH negative for acute infarction  CTA head and neck demonstrated mild narrowing at the origin of the left common carotid artery, vertebral arteries bilaterally, and right internal carotid artery origins without significant stenosis  Approximately 50% narrowing at the origin of the left internal carotid artery  Mild narrowing of the cavernous to supraclinoid internal carotid arteries  No high-grade proximal stenosis to visualized [place]  Stable aneurysm coil seen at the basilar tip  Patient was admitted for further workup  He was also started on antibiotics for suspected UTI  MRI brain demonstrated a 1 2 cm focal area of diffusion restriction adjacent to the posterior body of the left lateral ventricle in the region of the posterior corona radiata without associated contrast enhancement compatible with acute lacunar infarct  A1C 8 4  Lipid panel ,   ECHO with EF 75%, no regional wall motion abnormalities  No atrial dilation  This was felt to be small vessel in origin  His ASA was changed to Plavix 75mg and Lipitor was increased from 20mg to 80mg daily  Patient more recently presented to the hospital with weakness, difficulty assisting with transfers, shakiness  BP was 194/93 in the ED  CTH unremarkable  He met sepsis criteria and was found to have a UTI  Neurology was consulted due to generalized weakness, fogginess  Acute MS flare not suspected by inpatient neurology, it was felt he had encephalopathy and worsening of his baseline due to infection    No steroids were given, imaging not updated  Today, patient reports he is doing well  He did fall out of bed about a week ago and hit his forehead on the left  He denies headache, but says he has been dizzy and "woozy" since then  He did not seek evaluation for this  He denies any new MS symptoms at this time  I was able to speak directly with his PCP at [address] [place] [address], Dr Gabino Garber today  He has a nurse seeing him once a month, has labs done at least every 6 months  Last A1C was completed 5/10/21 and was 8 7 (down from over 10)  LDL in Feb 2021 was 101  He is on atorvastatin 80mg in a combo pill with amlodipine (Caduet)  The following portions of the patient's history were reviewed and updated as appropriate: current medications, past family history, past medical history, past social history, past surgical history and problem list          Objective:    Blood pressure 131/62, pulse 86, temperature 98 8 °F (37 1 °C), resp  rate 16  Physical Exam  Constitutional:       Appearance: Normal appearance  He is well-developed  HENT:      Head: Normocephalic and atraumatic  Eyes:      Extraocular Movements: EOM normal  No nystagmus  Pupils: Pupils are equal, round, and reactive to light  Pulmonary:      Effort: Pulmonary effort is normal    Skin:     General: Skin is warm and dry  Neurological:      Mental Status: He is alert  Deep Tendon Reflexes:      Reflex Scores:       Bicep reflexes are 3+ on the right side and 3+ on the left side  Brachioradialis reflexes are 3+ on the right side and 3+ on the left side  Patellar reflexes are 3+ on the right side and 3+ on the left side  Achilles reflexes are 2+ on the right side and 2+ on the left side  Psychiatric:         Mood and Affect: Mood normal          Speech: Speech normal          Behavior: Behavior normal          Neurological Exam  Mental Status  Alert  Oriented to person, place, time and situation   Speech is normal  Language is fluent with no aphasia  Attention and concentration are normal     Cranial Nerves  CN II: Visual fields full to confrontation  CN III, IV, VI: Extraocular movements intact bilaterally  Right esotropia, chronic  No nystagmus  Pupils equal round and reactive to light bilaterally  CN V: Facial sensation is normal   CN VII: Full and symmetric facial movement  CN VIII: Hearing is normal   CN IX, X: Palate elevates symmetrically  CN XI: Shoulder shrug strength is normal   CN XII: Tongue midline without atrophy or fasciculations  Motor   Normal muscle tone  Right                     Left   Shoulder abduction               5-                          5  Elbow flexion                         5                          5  Elbow extension                    5                          5  Hip flexion                              2                          2  Dorsiflexion                            0                          0  Feet everted  Slight fixation on the right   Sensory  Light touch is normal in upper and lower extremities  Reflexes                                           Right                      Left  Brachioradialis                    3+                         3+  Biceps                                 3+                         3+  Patellar                                3+                         3+  Achilles                                2+                         2+    Coordination  Right: Finger-to-nose normal   Left: Finger-to-nose normal     Gait  Non-ambulatory, in a WC         ROS:    Review of Systems   Constitutional: Positive for appetite change and fatigue  Negative for fever  HENT: Negative  Negative for hearing loss, tinnitus, trouble swallowing and voice change  Eyes: Negative  Negative for photophobia and pain  Respiratory: Positive for shortness of breath  Cardiovascular: Negative  Negative for palpitations     Gastrointestinal: Positive for constipation  Negative for nausea and vomiting  Endocrine: Negative  Negative for cold intolerance  Genitourinary: Negative for dysuria and frequency  Super pubic catheter   Musculoskeletal: Positive for gait problem (non weightbearing ) and joint swelling  Negative for myalgias and neck pain  Skin: Negative  Negative for rash  Neurological: Positive for dizziness (at times), tremors (hands and arms) and light-headedness (at times)  Negative for seizures, syncope, facial asymmetry, speech difficulty, weakness, numbness and headaches  Hematological: Negative  Does not bruise/bleed easily  Psychiatric/Behavioral: Positive for agitation, confusion and sleep disturbance  Negative for hallucinations  The patient is nervous/anxious           Depression, mood swings, memory issues at times       I personally reviewed and updated the ROS as appropriate

## 2021-07-21 NOTE — TELEPHONE ENCOUNTER
Called [facility] nurse om  advise of 6 month f/up scheduled [id number] 115pm with valentin sotomayor advised of ct order can call  to schedule or call us to assist [phone] her to schedule

## 2021-07-21 NOTE — ASSESSMENT & PLAN NOTE
S/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 in March 2015 by Dr Maxime Fuentes  He has been following with Dr Anthony Cast and imaging completed in 2020 was stable  He has now been told that he does not need routine follow-up of this any longer unless any concerns  Discussed optimal BP management

## 2021-07-21 NOTE — PATIENT INSTRUCTIONS
CTH due to head injury 1 week ago, on Plavix, and having ongoing dizziness  No focal deficits on exam outside of his baseline  For stroke prevention continue Plavix 75mg daily and atorvastatin 80mg daily  Goal BP <130/80 routinely, Goal A1C <7 0, Goal LDL <70  Will defer management of blood pressure, cholesterol and blood sugar to PCP  Heart healthy diet advised  Follow up in 6 months  Call for any new symptoms    If you experience any facial droop, weakness on one side of the body, speech or swallowing difficulty, painless loss of vision in one eye, double vision, vertigo that does not resolve quickly/imbalance, go to the ER/call 911

## 2022-01-18 ENCOUNTER — TELEPHONE (OUTPATIENT)
Dept: NEUROLOGY | Facility: CLINIC | Age: 72
End: 2022-01-18

## 2022-01-21 ENCOUNTER — PROCEDURE VISIT (OUTPATIENT)
Dept: UROLOGY | Facility: CLINIC | Age: 72
End: 2022-01-21
Payer: MEDICARE

## 2022-01-21 ENCOUNTER — TELEPHONE (OUTPATIENT)
Dept: UROLOGY | Facility: CLINIC | Age: 72
End: 2022-01-21

## 2022-01-21 VITALS — DIASTOLIC BLOOD PRESSURE: 74 MMHG | SYSTOLIC BLOOD PRESSURE: 134 MMHG

## 2022-01-21 DIAGNOSIS — N31.9 NEUROGENIC BLADDER: Primary | ICD-10-CM

## 2022-01-21 PROCEDURE — 52000 CYSTOURETHROSCOPY: CPT | Performed by: UROLOGY

## 2022-01-21 RX ORDER — LOPERAMIDE HYDROCHLORIDE 2 MG/1
CAPSULE ORAL
COMMUNITY
Start: 2021-11-04

## 2022-01-21 RX ORDER — ONDANSETRON 4 MG/1
TABLET, FILM COATED ORAL
COMMUNITY
Start: 2021-11-04

## 2022-01-21 NOTE — PROGRESS NOTES
Cystoscopy     Date/Time 1/21/2022 11:18 AM     Performed by  Nj Gould MD     Authorized by Nj Gould MD          Jairon Eason is a 42-year-old male with history of bladder outlet obstruction managed with a suprapubic tube  He previously underwent TURP along with suprapubic tube insertion  He has never been able to void spontaneously  He previously underwent cystolitholapaxy  He has a history of CVA  He is on anticoagulation  He presents today for surveillance cystoscopy and suprapubic tube exchange  His suprapubic tube was removed  The suprapubic tube tract was prepped and draped in sterile fashion  Cystoscopy was performed through the suprapubic tube tract  The bladder was entered and inspected  There were no stones identified  The bladder neck was visualized  Retroflexion was normal   There were no mucosal abnormalities or lesions other than expected inflammatory changes of the bladder from the chronic indwelling tube  The cystoscope was removed  the bladder was left full  A new 24 Telugu suprapubic tube was placed and connected to gravity drainage  10 cc were placed into the balloon  The tube was capped  Impression:  BPH, neurogenic bladder    Plan:  I recommend maintaining the suprapubic tube in place  The patient wishes to maintain the tube capped  He intermittently uncaps the tube for drainage  We discussed that his follow-up can be in 2 years with cystoscopy  In the interim he will have routine suprapubic tube changes every 4-6 weeks

## 2022-01-24 ENCOUNTER — OFFICE VISIT (OUTPATIENT)
Dept: NEUROLOGY | Facility: CLINIC | Age: 72
End: 2022-01-24
Payer: MEDICARE

## 2022-01-24 VITALS
HEIGHT: 64 IN | WEIGHT: 184 LBS | DIASTOLIC BLOOD PRESSURE: 74 MMHG | HEART RATE: 97 BPM | BODY MASS INDEX: 31.41 KG/M2 | TEMPERATURE: 98 F | RESPIRATION RATE: 18 BRPM | SYSTOLIC BLOOD PRESSURE: 146 MMHG

## 2022-01-24 DIAGNOSIS — G35 MULTIPLE SCLEROSIS (HCC): Primary | ICD-10-CM

## 2022-01-24 DIAGNOSIS — I72.5 ANEURYSM OF BASILAR ARTERY (HCC): ICD-10-CM

## 2022-01-24 DIAGNOSIS — Z86.73 HISTORY OF CVA (CEREBROVASCULAR ACCIDENT): ICD-10-CM

## 2022-01-24 DIAGNOSIS — N31.9 NEUROGENIC BLADDER: ICD-10-CM

## 2022-01-24 PROCEDURE — 99214 OFFICE O/P EST MOD 30 MIN: CPT | Performed by: PHYSICIAN ASSISTANT

## 2022-01-24 NOTE — PATIENT INSTRUCTIONS
Continue Plavix and statin for secondary stroke prevention  Continue to work with PCP on management of blood pressure, cholesterol and blood sugar  Continue physical therapy  Continue to follow with urology  Per GI note, you need an update colonoscopy this year (2022)    Would call to arrange   Follow up in 6 months or sooner if needed

## 2022-01-24 NOTE — PROGRESS NOTES
Patient ID: Shira Stanton is a 66 y o  male  Assessment/Plan:    Multiple sclerosis ([facility] )  Patient with very longstanding MS, not currently on a disease modifying agent  He is wheelchair-bound, can assist transfers  He has not had any new MS symptoms  Will continue to monitor  History of CVA (cerebrovascular accident)  Patient with left-sided lacunar infarct in 2018  He is maintained on Plavix 75 mg daily and atorvastatin 80 mg daily (a component in Caduet, along with amlodipine)  No new neurologic symptoms to indicate recurrent TIA/CVA  Advised to continue to follow with PCP to maintain good control of cardiovascular risk factors including BP (goal <130/80), lipids and blood sugar (goal A1C <7 0 )    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HCC)  S/P stent assisted coil embolization of a basilar artery apex aneurysm in March 2015 by Dr Cale Yang  He has been following with Dr Zayda Cabrera and imaging completed in 2020 was stable  He has now been told that he does not need routine follow-up of this any longer unless any concerns  Discussed optimal BP management  Neurogenic bladder  Follows with urology, just saw them last week  Needs 2 year follow up and will keep SPT in  Follow up in 6 months or sooner if needed  Diagnoses and all orders for this visit:    Multiple sclerosis ([facility] )    History of CVA (cerebrovascular accident)    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HCC)    Neurogenic bladder           Subjective:    EVAN    Janelle Enrique is a 66year old male who presents for follow up  He was last seen in July 2021  To review, patient diagnosed with probable MS in the 1960s  Actually unclear diagnosis from time of presentation to our center in 2014  Patient recalls at age of 11 having chest pain and then numbness of the left toes tingling up the leg to mid chest around T6 and then down the right side in similar distribution to the right foot  Patient had loss of control of bowel and bladder at that time  He had no ability to walk and was in the hospital for over 6 months  He was only able to ambulate with crutches in his 20s, 30s, 40s, etc  Patient still uses [employer] crutches as well as a wheelchair to ambulate  Patient was only ever given ACTH in the past  He was never on IMD meds prior to coming to our center in 2014  He was told he may have had a CVA that affected his walking  Patient with suprapubic catheter placed by Dr Henrique Calderon  Labs unremarkable, NMO negative  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  MRI c-spine did not reveal any lesions  MRI t-spine reveals cord lesion from T3-5; no comparison on file  Patient with longstanding dizziness; he has strong family history of brain aneurysms- his sister had 3 aneurysms, 1 niece with 3 aneurysms, 1 niece who passed away from aneurysm and 1 nephew with aneurysm; patient's father with aneurysm in his abdominal area  He had CTA in January 2015, 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  He was seen neuro-surg and underwent 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 in March 2015 by Dr Lyndsay Gagnon of neuro-surg  He now follows with Dr Baron Arzate for monitoring of the aneurysm  After his updated imaging in 2020, which was stable, he was told to f/u with neurosurgery PRN  Patient was hospitalized in October 2018 due to RUE weakness and worsened RLE weakness (weak at baseline due to [address])  NIHSS 7  He was not given tPA due to concern this was MS exacerbation  CTH negative for acute infarction  CTA head and neck demonstrated mild narrowing at the origin of the left common carotid artery, vertebral arteries bilaterally, and right internal carotid artery origins without significant stenosis  Approximately 50% narrowing at the origin of the left internal carotid artery  Mild narrowing of the cavernous to supraclinoid internal carotid arteries  No high-grade proximal stenosis to visualized [place]   Stable aneurysm coil seen at the basilar tip  Patient was admitted for further workup  He was also started on antibiotics for suspected UTI  MRI brain demonstrated a 1 2 cm focal area of diffusion restriction adjacent to the posterior body of the left lateral ventricle in the region of the posterior corona radiata without associated contrast enhancement compatible with acute lacunar infarct  A1C 8 4  Lipid panel ,   ECHO with EF 75%, no regional wall motion abnormalities  No atrial dilation  This was felt to be small vessel in origin  His ASA was changed to Plavix 75mg and Lipitor was increased from 20mg to 80mg daily  Today, patient reports he is overall doing well  He denies any new MS symptoms at this time  He remains on Plavix and statin  I had previously spoken with his doctor at [facility] who says he is monitored closely, sees a nurse once a month and has labs at least every 6 months  Patient reports he is getting PT once a week  He has some neck and back pain  He saw urology last week and now needs to return in 2 years  He has a suprapubic catheter which is changed regularly  He has not had any recent hospitalizations  He is up to date with COVID and flu vaccines  The following portions of the patient's history were reviewed and updated as appropriate: current medications, past family history, past medical history, past social history, past surgical history and problem list          Objective:    Blood pressure 146/74, pulse 97, temperature 98 °F (36 7 °C), temperature source Tympanic, resp  rate 18, height 5' 4" (1 626 m), weight 83 5 kg (184 lb)  Physical Exam  Constitutional:       Appearance: Normal appearance  HENT:      Head: Normocephalic and atraumatic  Eyes:      Extraocular Movements: EOM normal  No nystagmus  Pupils: Pupils are equal, round, and reactive to light  Neurological:      Mental Status: He is alert        Deep Tendon Reflexes:      Reflex Scores: Bicep reflexes are 3+ on the right side and 3+ on the left side  Brachioradialis reflexes are 3+ on the right side and 3+ on the left side  Patellar reflexes are 3+ on the right side and 3+ on the left side  Achilles reflexes are 2+ on the right side and 2+ on the left side  Psychiatric:         Mood and Affect: Mood normal          Speech: Speech normal          Behavior: Behavior normal          Neurological Exam  Mental Status  Alert  Oriented to person, place, time and situation  Recent and remote memory are intact  Speech is normal  Language is fluent with no aphasia  Attention and concentration are normal     Cranial Nerves  CN II: Visual fields full to confrontation  CN III, IV, VI: Extraocular movements intact bilaterally  Right esotropia, chronic  Margueritte Ezekiel No nystagmus  Pupils equal round and reactive to light bilaterally  CN V: Facial sensation is normal   CN VII: Full and symmetric facial movement  CN VIII: Hearing is normal   CN IX, X: Palate elevates symmetrically  CN XI: Shoulder shrug strength is normal   CN XII: Tongue midline without atrophy or fasciculations  Motor   Normal muscle tone  Right                     Left   Shoulder abduction               5                          5  Elbow flexion                         5                          5  Elbow extension                    5                          5  Hip flexion                              1                          2  Dorsiflexion                            1                          2    Sensory  Light touch is normal in upper and lower extremities       Reflexes                                           Right                      Left  Brachioradialis                    3+                         3+  Biceps                                 3+                         3+  Patellar                                3+                         3+  Achilles 2+                         2+    Coordination  Right: Finger-to-nose normal   Left: Finger-to-nose normal     Gait  In a wheelchair   ROS:    Review of Systems   Constitutional: Negative for chills and fever  HENT: Negative for ear pain and sore throat  Eyes: Positive for pain (bilateral eyes -- sharp pain)  Negative for visual disturbance (bilateral eyes -- blurred vision)  Respiratory: Positive for cough and shortness of breath  Cardiovascular: Positive for palpitations  Negative for chest pain  Gastrointestinal: Negative for abdominal pain and vomiting  Genitourinary: Negative for dysuria and hematuria  Musculoskeletal: Positive for back pain (middle of the back) and neck pain (currently)  Negative for arthralgias  Skin: Negative for color change and rash  Neurological: Positive for dizziness, weakness and light-headedness  Negative for seizures and syncope  All other systems reviewed and are negative      I personally reviewed and updated the ROS as appropriate

## 2022-01-24 NOTE — ASSESSMENT & PLAN NOTE
S/P stent assisted coil embolization of a basilar artery apex aneurysm in March 2015 by Dr Marbella Larsen  He has been following with Dr Topher Kaiser and imaging completed in 2020 was stable  He has now been told that he does not need routine follow-up of this any longer unless any concerns  Discussed optimal BP management

## 2022-01-24 NOTE — ASSESSMENT & PLAN NOTE
Patient with very longstanding MS, not currently on a disease modifying agent  He is wheelchair-bound, can assist transfers  He has not had any new MS symptoms  Will continue to monitor

## 2022-01-24 NOTE — ASSESSMENT & PLAN NOTE
Patient with left-sided lacunar infarct in 2018  He is maintained on Plavix 75 mg daily and atorvastatin 80 mg daily (a component in Caduet, along with amlodipine)  No new neurologic symptoms to indicate recurrent TIA/CVA      Advised to continue to follow with PCP to maintain good control of cardiovascular risk factors including BP (goal <130/80), lipids and blood sugar (goal A1C <7 0 )

## 2022-02-12 NOTE — ASSESSMENT & PLAN NOTE
Patient with very longstanding MS, not currently on a disease modifying agent  He is essentially wheelchair-bound, can assist transfers  He has not had any new MS symptoms  He was recently hospitalized in early June for a UTI and had worsening of his baseline weakness  He was not given steroids due to this was likely a pseudo flare caused by his UTI  His neurologic exam is stable today  12-Feb-2022 04:27

## 2022-05-07 ENCOUNTER — HOSPITAL ENCOUNTER (EMERGENCY)
Facility: HOSPITAL | Age: 72
Discharge: HOME | End: 2022-05-07
Attending: EMERGENCY MEDICINE | Admitting: EMERGENCY MEDICINE
Payer: MEDICARE

## 2022-05-07 ENCOUNTER — APPOINTMENT (EMERGENCY)
Dept: RADIOLOGY | Facility: HOSPITAL | Age: 72
End: 2022-05-07
Payer: MEDICARE

## 2022-05-07 VITALS
OXYGEN SATURATION: 97 % | RESPIRATION RATE: 18 BRPM | BODY MASS INDEX: 28.12 KG/M2 | TEMPERATURE: 97.7 F | DIASTOLIC BLOOD PRESSURE: 94 MMHG | WEIGHT: 163.8 LBS | HEART RATE: 88 BPM | SYSTOLIC BLOOD PRESSURE: 195 MMHG

## 2022-05-07 DIAGNOSIS — M75.32 CALCIFIC TENDONITIS OF LEFT SHOULDER: Primary | ICD-10-CM

## 2022-05-07 LAB
ATRIAL RATE: 85 BPM
P AXIS: 64 DEGREES
PR INTERVAL: 174 MS
QRS AXIS: 63 DEGREES
QRSD INTERVAL: 94 MS
QT INTERVAL: 362 MS
QTC INTERVAL: 430 MS
T WAVE AXIS: 66 DEGREES
VENTRICULAR RATE: 85 BPM

## 2022-05-07 PROCEDURE — 96372 THER/PROPH/DIAG INJ SC/IM: CPT

## 2022-05-07 PROCEDURE — 93010 ELECTROCARDIOGRAM REPORT: CPT | Performed by: INTERNAL MEDICINE

## 2022-05-07 PROCEDURE — 99285 EMERGENCY DEPT VISIT HI MDM: CPT | Performed by: EMERGENCY MEDICINE

## 2022-05-07 PROCEDURE — 93005 ELECTROCARDIOGRAM TRACING: CPT

## 2022-05-07 PROCEDURE — 99284 EMERGENCY DEPT VISIT MOD MDM: CPT

## 2022-05-07 PROCEDURE — 73030 X-RAY EXAM OF SHOULDER: CPT

## 2022-05-07 RX ORDER — OXYCODONE HYDROCHLORIDE AND ACETAMINOPHEN 5; 325 MG/1; MG/1
1 TABLET ORAL EVERY 6 HOURS PRN
Qty: 20 TABLET | Refills: 0 | Status: SHIPPED | OUTPATIENT
Start: 2022-05-07

## 2022-05-07 RX ORDER — MELOXICAM 15 MG/1
15 TABLET ORAL DAILY
Qty: 20 TABLET | Refills: 0 | Status: SHIPPED | OUTPATIENT
Start: 2022-05-07

## 2022-05-07 RX ORDER — ONDANSETRON 4 MG/1
4 TABLET, ORALLY DISINTEGRATING ORAL ONCE
Status: COMPLETED | OUTPATIENT
Start: 2022-05-07 | End: 2022-05-07

## 2022-05-07 RX ORDER — MORPHINE SULFATE 4 MG/ML
4 INJECTION, SOLUTION INTRAMUSCULAR; INTRAVENOUS ONCE
Status: COMPLETED | OUTPATIENT
Start: 2022-05-07 | End: 2022-05-07

## 2022-05-07 RX ORDER — KETOROLAC TROMETHAMINE 30 MG/ML
30 INJECTION, SOLUTION INTRAMUSCULAR; INTRAVENOUS ONCE
Status: COMPLETED | OUTPATIENT
Start: 2022-05-07 | End: 2022-05-07

## 2022-05-07 RX ADMIN — ONDANSETRON 4 MG: 4 TABLET, ORALLY DISINTEGRATING ORAL at 11:29

## 2022-05-07 RX ADMIN — MORPHINE SULFATE 4 MG: 4 INJECTION INTRAVENOUS at 08:30

## 2022-05-07 RX ADMIN — KETOROLAC TROMETHAMINE 30 MG: 30 INJECTION, SOLUTION INTRAMUSCULAR at 08:30

## 2022-05-07 NOTE — ED PROVIDER NOTES
History  Chief Complaint   Patient presents with    Shoulder Pain     Pt arrived via EMS for L shoulder pain  No SOB or CP  Hx of shoulder arthritis  Patient is a 71-year-old male  He has multiple sclerosis  He has had a stroke  He is a diabetic  Patient does report a history of arthritis  He presents to the emergency room with a 1 day history of atraumatic left shoulder pain  Eyes overuse  Patient reports that the pain is severe  Worse with movement  Today was having difficulty getting out of bed because of the pain  No associated motor or sensory complaints  No fever or chills  No chest pain or shortness of breath  Prior to Admission Medications   Prescriptions Last Dose Informant Patient Reported? Taking?    ALPRAZolam (XANAX) 0 25 mg tablet  Self Yes No   Sig: Take 0 25 mg by mouth 2 (two) times a day as needed for anxiety or sleep    Acetaminophen (Mapap) 500 MG  Self Yes No   Sig: Take 500 mg by mouth every 6 (six) hours as needed for mild pain or moderate pain   Dextromethorphan-guaiFENesin (Mucinex DM Maximum Strength)  MG TB12  Self Yes No   Sig: Take 1 tablet by mouth 2 (two) times a day as needed   Dulaglutide (Trulicity) 2 29 DF/9 2VZ SOPN  Self Yes No   Sig: Inject 0 75 mg under the skin once a week   Ergocalciferol (VITAMIN D2 PO)  Self Yes No   Sig: Take 50,000 Units by mouth once a week   Lidocaine 4 % PTCH  Self Yes No   Sig: Apply 1 patch topically daily Leave in place for 12 hours   Patient not taking: Reported on 1/24/2022    Menthol, Topical Analgesic, (Biofreeze) 4 % GEL  Self Yes No   Sig: Apply 1 application topically 4 (four) times a day as needed   Patient not taking: Reported on 1/24/2022    albuterol (2 5 mg/3 mL) 0 083 % nebulizer solution  Self Yes No   Sig: Take 2 5 mg by nebulization every 6 (six) hours as needed for wheezing or shortness of breath   albuterol (PROVENTIL HFA,VENTOLIN HFA) 90 mcg/act inhaler  Self Yes No   Sig: Inhale 2 puffs every 6 (six) hours as needed for wheezing   amLODIPine-atorvastatin (CADUET) 10-80 MG per tablet  Self Yes No   Sig: Take 1 tablet by mouth daily   busPIRone (BUSPAR) 7 5 mg tablet  Self Yes No   Sig: Take 7 5 mg by mouth see administration instructions Take 2 tablets (15 mg) every morning; Take 1 tablet (7 5 mg) every evening   Patient not taking: Reported on 1/24/2022    clopidogrel (PLAVIX) 75 mg tablet  Self No No   Sig: Take 1 tablet (75 mg total) by mouth daily   dextran 70-hypromellose (GenTeal Tears) 0 1-0 3 % ophthalmic solution  Self Yes No   Sig: Administer 2 drops to both eyes 4 (four) times a day as needed   furosemide (LASIX) 40 mg tablet  Self Yes No   Sig: Take 40 mg by mouth daily   gabapentin (NEURONTIN) 300 mg capsule  Self No No   Sig: Take 1 capsule (300 mg total) by mouth 2 (two) times a day   gabapentin (NEURONTIN) 300 mg capsule  Self No No   Sig: Take 2 capsules (600 mg total) by mouth daily at bedtime   Patient not taking: Reported on 1/24/2022    latanoprost (XALATAN) 0 005 % ophthalmic solution  Self Yes No   Sig: Administer 1 drop to both eyes daily at bedtime   loperamide (IMODIUM) 2 mg capsule   Yes No   Patient not taking: Reported on 1/24/2022    losartan (COZAAR) 50 mg tablet  Self Yes No   Sig: Take 50 mg by mouth daily     meclizine (ANTIVERT) 12 5 MG tablet  Self Yes No   Sig: Take 12 5 mg by mouth 2 (two) times a day as needed for dizziness   Patient not taking: Reported on 1/24/2022    melatonin 3 mg  Self Yes No   Sig: Take 3 mg by mouth daily at bedtime as needed   metFORMIN (GLUCOPHAGE) 1000 MG tablet  Self Yes No   Sig: Take 1,000 mg by mouth 2 (two) times a day   ondansetron (ZOFRAN) 4 mg tablet   Yes No   Patient not taking: Reported on 1/24/2022    pantoprazole (PROTONIX) 40 mg tablet  Self No No   Sig: TAKE 1 TABLET TWICE DAILY 30 MINUTES BEFORE BREAKFAST AND DINNER    polyethylene glycol (MIRALAX) 17 g packet  Self Yes No   Sig: Take 17 g by mouth daily as needed   Patient not taking: Reported on 1/24/2022    potassium chloride (Klor-Con) 10 mEq tablet  Self Yes No   Sig: Take 10 mEq by mouth 2 (two) times a day   Patient not taking: Reported on 1/24/2022    propranolol (INDERAL LA) 60 mg 24 hr capsule  Self Yes No   Sig: Take 60 mg by mouth daily   senna-docusate sodium (Senokot S) 8 6-50 mg per tablet  Self Yes No   Sig: Take 2 tablets by mouth daily at bedtime    Patient not taking: Reported on 1/24/2022    sertraline (ZOLOFT) 25 mg tablet  Self Yes No   Sig: Take 25 mg by mouth daily at bedtime       Facility-Administered Medications: None       Past Medical History:   Diagnosis Date    Acute laryngitis     Acute nonsuppurative otitis media, unspecified laterality     Arm weakness     Arthritis     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HCC)     Bladder infection     Bronchitis     Constipation     Cough     Diabetes mellitus (HCC)     Dizziness     Dysfunction of eustachian tube     Erectile dysfunction of non-organic origin     Fatigue     Glaucoma     Hiatal hernia     Hypertension     Imbalance     Leg muscle spasm     [facility]     Nephrolithiasis     No natural teeth     Sinus pain     Spinal stenosis     Strain of thoracic region     Stroke ([address] )     Suprapubic catheter ([address] )        Past Surgical History:   Procedure Laterality Date    APPENDECTOMY      BRAIN SURGERY      Coil placed in aneurysm    CYSTOSCOPY      CYSTOSCOPY      CYSTOSCOPY  06/11/2018    CYSTOSCOPY  01/15/2021    EYE SURGERY      transscleral cyclophotocoagulation noncontact YAG laser    MYRINGOTOMY      with ventilation tube insertion    ND REMOVE BLADDER STONE,<2 5 CM N/A 5/7/2019    Procedure: CYSTOSCOPY, holmium laser litholapaxy of bladder stones, EXCHANGE OF SP TUBE;  Surgeon: Ca Temple MD;  [facility]: [facility];  Service: Urology    SUPRAPUBIC CATHETER INSERTION         Family History   Problem Relation Age of Onset    Heart attack Mother     Stroke Mother     Heart attack Father     Anuerysm Father         In Stomach      I have reviewed and agree with the history as documented  E-Cigarette/Vaping    E-Cigarette Use Never User      E-Cigarette/Vaping Substances     Social History     Tobacco Use    Smoking status: Former Smoker     Types: Cigarettes    Smokeless tobacco: Never Used    Tobacco comment: smoked for 15-20 years, stopped about 25 years ago   Vaping Use    Vaping Use: Never used   Substance Use Topics    Alcohol use: Not Currently    Drug use: No       Review of Systems   Constitutional: Negative for chills and fever  Respiratory: Negative for cough and shortness of breath  Cardiovascular: Negative for chest pain and leg swelling  All other systems reviewed and are negative  Physical Exam  Physical Exam  Vitals reviewed  Constitutional:       General: He is not in acute distress  Appearance: He is obese  HENT:      Head: Normocephalic and atraumatic  Mouth/Throat:      Mouth: Mucous membranes are moist    Eyes:      General:         Right eye: No discharge  Left eye: No discharge  Conjunctiva/sclera: Conjunctivae normal    Cardiovascular:      Rate and Rhythm: Normal rate and regular rhythm  Pulses: Normal pulses  Heart sounds: Normal heart sounds  No murmur heard  No friction rub  No gallop  Pulmonary:      Effort: Pulmonary effort is normal  No respiratory distress  Breath sounds: Normal breath sounds  No stridor  No wheezing, rhonchi or rales  Abdominal:      General: Bowel sounds are normal  There is no distension  Palpations: Abdomen is soft  Tenderness: There is no abdominal tenderness  Musculoskeletal:         General: Tenderness present  No swelling, deformity or signs of injury  Cervical back: Neck supple  No rigidity  Comments: There is tenderness with palpation of the left glenohumeral joint  There is severe pain with range of motion  Neurovascular exam is normal    Skin:     General: Skin is warm and dry  Neurological:      General: No focal deficit present  Mental Status: He is alert and oriented to person, place, and time  Psychiatric:         Mood and Affect: Mood normal          Behavior: Behavior normal          Vital Signs  ED Triage Vitals [05/07/22 0748]   Temperature Pulse Respirations Blood Pressure SpO2   97 7 °F (36 5 °C) 88 18 (!) 195/94 97 %      Temp Source Heart Rate Source Patient Position - Orthostatic VS BP Location FiO2 (%)   Oral Monitor Lying Right arm --      Pain Score       10 - Worst Possible Pain           Vitals:    05/07/22 0748   BP: (!) 195/94   Pulse: 88   Patient Position - Orthostatic VS: Lying         Visual Acuity      ED Medications  Medications   ketorolac (TORADOL) injection 30 mg (30 mg Intramuscular Given 5/7/22 0830)   morphine (PF) 4 mg/mL injection 4 mg (4 mg Intramuscular Given 5/7/22 0830)       Diagnostic Studies  Results Reviewed     None                 XR shoulder 2+ views LEFT   ED Interpretation by Shreya Parr MD (05/07 1049)   Calcific tendinitis  No fracture or dislocation  Procedures  ECG 12 Lead Documentation Only    Date/Time: 5/7/2022 8:47 AM  Performed by: Shreya Parr MD  Authorized by: Shreya Parr MD     ECG reviewed by me, the ED Provider: yes    Patient location:  ED  Interpretation:     Interpretation: normal    Rate:     ECG rate assessment: normal    Rhythm:     Rhythm: sinus rhythm    Ectopy:     Ectopy: none    QRS:     QRS axis:  Normal  Conduction:     Conduction: normal    ST segments:     ST segments:  Normal  T waves:     T waves: normal               ED Course                               SBIRT 22yo+      Most Recent Value   SBIRT (22 yo +)    In order to provide better care to our patients, we are screening all of our patients for alcohol and drug use  Would it be okay to ask you these screening questions?  No Filed at: 05/07/2022 4925                    [facility]  Number of Diagnoses or Management Options  Diagnosis management comments: EKG was normal   This is clearly musculoskeletal   This is not acute coronary syndrome, pneumothorax or pulmonary embolism  Xray suggests calcific tendonitis  Amount and/or Complexity of Data Reviewed  Tests in the radiology section of CPT®: ordered and reviewed  Independent visualization of images, tracings, or specimens: yes        Disposition  Final diagnoses:   Calcific tendonitis of left shoulder     Time reflects when diagnosis was documented in both MDM as applicable and the Disposition within this note     Time User Action Codes Description Comment    5/7/2022  9:25 AM Angelica Santo Add [M75 32] Calcific tendonitis of left shoulder       ED Disposition     ED Disposition Condition Date/Time Comment    Discharge Stable Sat May 7, 2022  9:25 AM Ade Duke discharge to home/self care              Follow-up Information     Follow up With Specialties Details Why Contact Info Additional Information    [facility] [place],  Geriatric Medicine, Family Medicine In 2 days  [address]       [address] Orthopedic Surgery In 1 week  [address] [facility]  [address] [facility] [address]  [address] [place] [address], [address] [facility], [address] [place], [place], [id number] [phone]          Patient's Medications   Discharge Prescriptions    MELOXICAM (MOBIC) 15 MG TABLET    Take 1 tablet (15 mg total) by mouth daily Prn pain       Start Date: 5/7/2022  End Date: --       Order Dose: 15 mg       Quantity: 20 tablet    Refills: 0    OXYCODONE-ACETAMINOPHEN (PERCOCET) 5-325 MG PER TABLET    Take 1 tablet by mouth every 6 (six) hours as needed for severe pain Max Daily Amount: 4 tablets       Start Date: 5/7/2022  End Date: --       Order Dose: 1 tablet       Quantity: 20 tablet Refills: 0       No discharge procedures on file      PDMP Review     None          ED Provider  Electronically Signed by           Sharron Carmona MD  05/07/22 1978

## 2022-05-07 NOTE — ED NOTES
Spoke to Gael from [facility] for  info, they will call back with a katarzyna Slaughter  05/07/22 6972

## 2022-07-13 ENCOUNTER — TELEPHONE (OUTPATIENT)
Dept: NEUROLOGY | Facility: CLINIC | Age: 72
End: 2022-07-13

## 2022-07-13 NOTE — TELEPHONE ENCOUNTER
Called pt to confirm upcoming appt on 07/25/22  Pt stated he has no ride and needs transportation  I informed him I will let the social workers know and they can set up arrangements  Social workers- please assist appt time is at 2:45p m  with a arrival time of 2:30p m  thank you

## 2022-07-13 NOTE — TELEPHONE ENCOUNTER
MSW phoned [employer] at [phone] to discuss transportation for patient  MSW left voicemail with callback information

## 2022-07-13 NOTE — TELEPHONE ENCOUNTER
MSW phoned [employer] a second time and was directed to transportation dept (est [phone])  MSW left voicemail with callback information

## 2022-07-14 NOTE — TELEPHONE ENCOUNTER
MSW phoned [facility] and spoke with Juvencio Henning confirmed patient has transportation scheduled for upcoming 07/25 appt  MSW phoned patient to confirm same  He appreciated update  No additional needs at this time  MSW will remain available to assist for any future social needs

## 2022-07-25 ENCOUNTER — OFFICE VISIT (OUTPATIENT)
Dept: NEUROLOGY | Facility: CLINIC | Age: 72
End: 2022-07-25
Payer: MEDICARE

## 2022-07-25 VITALS
WEIGHT: 163 LBS | DIASTOLIC BLOOD PRESSURE: 65 MMHG | HEART RATE: 82 BPM | TEMPERATURE: 97.6 F | RESPIRATION RATE: 18 BRPM | HEIGHT: 64 IN | SYSTOLIC BLOOD PRESSURE: 142 MMHG | BODY MASS INDEX: 27.83 KG/M2 | OXYGEN SATURATION: 96 %

## 2022-07-25 DIAGNOSIS — I72.5 ANEURYSM OF BASILAR ARTERY (HCC): ICD-10-CM

## 2022-07-25 DIAGNOSIS — Z86.73 HISTORY OF CVA (CEREBROVASCULAR ACCIDENT): ICD-10-CM

## 2022-07-25 DIAGNOSIS — G35 MULTIPLE SCLEROSIS (HCC): Primary | ICD-10-CM

## 2022-07-25 PROCEDURE — 99214 OFFICE O/P EST MOD 30 MIN: CPT | Performed by: PHYSICIAN ASSISTANT

## 2022-07-25 NOTE — PATIENT INSTRUCTIONS
Continue Plavix and statin for secondary stroke prevention  Continue to work with PCP on management of blood pressure, cholesterol and blood sugar  Call PCP tomorrow regarding the cough  Follow up in 6 months or sooner if needed

## 2022-07-25 NOTE — ASSESSMENT & PLAN NOTE
Patient with very longstanding MS, not currently on a disease modifying agent  [facility] is wheelchair-bound, can assist transfers  [facility] has not had any new MS symptoms   Will continue to monitor  He currently has some cough and wheezing, but pulse ox is 96, non-labored breathing, breath sounds ok  I told him to call [employer] tomorrow and speak with his nurse/PCP and if any worsening of his condition, be seen in the ED

## 2022-07-25 NOTE — TELEPHONE ENCOUNTER
Patient arrived for his appointment today  After it was over he waited over an hour for his ride home from [place]  The [facility] team called 3 times to ensure they were coming to pick him up but was not able to get any details other than "someone is coming"  MSW called [facility] ([phone]) and was informed that the patient would be picked up by 4:45pm  His 's name is Jono Avina  MSW stayed with the patient  He was picked up by the  at 1:78OX      There are no further social needs be addressed at this time  MSW will be available to assist with any future needs in regard to this patient

## 2022-07-25 NOTE — PROGRESS NOTES
Patient ID: Marco Antonio Worthy is a 66 y o  male  Assessment/Plan:    Multiple sclerosis ([facility] )  Patient with very longstanding MS, not currently on a disease modifying agent  Maddy Fontaine is wheelchair-bound, can assist transfers  Maddy Fontaine has not had any new MS symptoms   Will continue to monitor  He currently has some cough and wheezing, but pulse ox is 96, non-labored breathing, breath sounds ok  I told him to call [facility] tomorrow and speak with his nurse/PCP and if any worsening of his condition, be seen in the ED  History of CVA (cerebrovascular accident)  Patient with left-sided lacunar infarct in 2018  He is maintained on Plavix 75 mg daily and atorvastatin 80 mg daily (a component in [place], along with amlodipine)        No new neurologic symptoms to indicate recurrent TIA/CVA      Advised to continue to follow with PCP to maintain good control of cardiovascular risk factors including BP (goal <130/80), lipids and blood sugar (goal A1C <7 0 )    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HCC)  S/P stent assisted coil embolization of a basilar artery apex aneurysm in March 2015 by Dr Felicie Hashimoto has been following with Dr Joaquim Kehr and imaging completed in 2020 was stable  Maddy Fontaine has now been told that he does not need routine follow-up of this any longer unless any concerns   Discussed optimal BP management  Patient will follow up in 6 months or sooner if needed  Diagnoses and all orders for this visit:    Multiple sclerosis ([facility] )    History of CVA (cerebrovascular accident)    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[facility] )           Subjective:    EVAN Durham Irma is a 66year old male who presents for follow up  He was last seen in January 2022  To review, patient diagnosed with probable MS in the 1960s  Actually unclear diagnosis from time of presentation to our center in 2014   Patient recalls at age of 11 having chest pain and then numbness of the left toes tingling up the leg to mid chest around T6 and then down the right side in similar distribution to the right foot  Patient had loss of control of bowel and bladder at that time  He had no ability to walk and was in the hospital for over 6 months  He was only able to ambulate with crutches in his 20s, 30s, 40s, etc  Patient still uses [place] crutches as well as a wheelchair to ambulate  Patient was only ever given ACTH in the past  He was never on IMD meds prior to coming to our center in 2014  He was told he may have had a CVA that affected his walking  Patient with suprapubic catheter placed by Dr Hortensia Wiggins  Labs unremarkable, NMO negative  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  MRI c-spine did not reveal any lesions  MRI t-spine reveals cord lesion from T3-5; no comparison on file  Patient with longstanding dizziness; he has strong family history of brain aneurysms- his sister had 3 aneurysms, 1 niece with 3 aneurysms, 1 niece who passed away from aneurysm and 1 nephew with aneurysm; patient's father with aneurysm in his abdominal area  He had CTA in January 2015, 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  He was seen neuro-surg and underwent 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 in March 2015 by Dr Bing Pierson of neuro-surg  He now follows with Dr Nicole Washburn for monitoring of the aneurysm  After his updated imaging in 2020, which was stable, he was told to f/u with neurosurgery PRN  Patient was hospitalized in October 2018 due to RUE weakness and worsened RLE weakness (weak at baseline due to [facility])  NIHSS 7  He was not given tPA due to concern this was MS exacerbation  CTH negative for acute infarction  CTA head and neck demonstrated mild narrowing at the origin of the left common carotid artery, vertebral arteries bilaterally, and right internal carotid artery origins without significant stenosis  Approximately 50% narrowing at the origin of the left internal carotid artery   Mild narrowing of the cavernous to supraclinoid internal carotid arteries  No high-grade proximal stenosis to visualized [place]  Stable aneurysm coil seen at the basilar tip  Patient was admitted for further workup  He was also started on antibiotics for suspected UTI  MRI brain demonstrated a 1 2 cm focal area of diffusion restriction adjacent to the posterior body of the left lateral ventricle in the region of the posterior corona radiata without associated contrast enhancement compatible with acute lacunar infarct  A1C 8 4  Lipid panel ,   ECHO with EF 75%, no regional wall motion abnormalities  No atrial dilation  This was felt to be small vessel in origin  His ASA was changed to Plavix 75mg and Lipitor was increased from 20mg to 80mg daily  Today, patient reports he is overall doing well  He denies any new MS symptoms at this time  He remains on Plavix and statin  He has a suprapubic catheter which is changed regularly, sees urology yearly  He has not had any recent hospitalizations  He does report some coughing and wheezing lately  He says he was at [employer] today and did not really mention this  He checks his pulse ox and home and it has been fine, above 95 always  The following portions of the patient's history were reviewed and updated as appropriate: current medications, past family history, past medical history, past social history, past surgical history and problem list          Objective:    Blood pressure 142/65, pulse 82, temperature 97 6 °F (36 4 °C), temperature source Tympanic, resp  rate 18, height 5' 4" (1 626 m), weight 73 9 kg (163 lb), SpO2 96 %  Physical Exam  Constitutional:       Appearance: Normal appearance  HENT:      Head: Normocephalic and atraumatic  Eyes:      Extraocular Movements: EOM normal  No nystagmus  Pupils: Pupils are equal, round, and reactive to light  Neurological:      Mental Status: He is alert        Deep Tendon Reflexes:      Reflex Scores:       Bicep reflexes are 3+ on the right side and 3+ on the left side  Brachioradialis reflexes are 3+ on the right side and 3+ on the left side  Patellar reflexes are 3+ on the right side and 3+ on the left side  Achilles reflexes are 2+ on the right side and 2+ on the left side  Psychiatric:         Mood and Affect: Mood normal          Speech: Speech normal          Behavior: Behavior normal          Neurological Exam  Mental Status  Alert  Oriented to person, place, time and situation  Speech is normal  Language is fluent with no aphasia  Attention and concentration are normal     Cranial Nerves  CN II: Visual fields full to confrontation  CN III, IV, VI: Extraocular movements intact bilaterally  Right esotropia, chronic  No nystagmus  Pupils equal round and reactive to light bilaterally  CN V: Facial sensation is normal   CN VII: Full and symmetric facial movement  CN VIII: Hearing is normal   CN IX, X: Palate elevates symmetrically  CN XI: Shoulder shrug strength is normal   CN XII: Tongue midline without atrophy or fasciculations  Motor   Normal muscle tone  Right                     Left   Shoulder abduction               5                          5  Elbow flexion                         5                          5  Elbow extension                    5                          5  Hip flexion                              1                          2  Dorsiflexion                            1                          2    Sensory  Light touch is normal in upper and lower extremities       Reflexes                                            Right                      Left  Brachioradialis                    3+                         3+  Biceps                                 3+                         3+  Patellar                                3+                         3+  Achilles                                2+ 2+    Coordination  Right: Finger-to-nose normal Left: Finger-to-nose normal     Gait    In a wheelchair   ROS:    Review of Systems   Constitutional: Positive for appetite change (decreased) and fatigue  Negative for chills and fever  HENT: Negative for ear pain and sore throat  Eyes: Negative for pain and visual disturbance  Respiratory: Negative for cough and shortness of breath  Cardiovascular: Negative for chest pain and palpitations  Gastrointestinal: Negative for abdominal pain and vomiting  Genitourinary: Negative for dysuria and hematuria  Musculoskeletal: Positive for back pain (lower back ), myalgias, neck pain and neck stiffness  Negative for arthralgias  Skin: Negative for color change and rash  Neurological: Positive for tremors (bilateral hands) and light-headedness  Negative for seizures and syncope  All other systems reviewed and are negative      I personally reviewed and updated the ROS as appropriate

## 2022-07-25 NOTE — ASSESSMENT & PLAN NOTE
S/P stent assisted coil embolization of a basilar artery apex aneurysm in March 2015 by Dr Edita De León has been following with Dr Ger Escalona and imaging completed in 2020 was stable  Liumarc Dans has now been told that he does not need routine follow-up of this any longer unless any concerns   Discussed optimal BP management

## 2022-09-09 ENCOUNTER — HOSPITAL ENCOUNTER (OUTPATIENT)
Dept: RADIOLOGY | Facility: HOSPITAL | Age: 72
Discharge: HOME | End: 2022-09-09
Payer: MEDICARE

## 2022-09-09 DIAGNOSIS — R06.02 SOB (SHORTNESS OF BREATH): ICD-10-CM

## 2022-09-09 PROCEDURE — 71046 X-RAY EXAM CHEST 2 VIEWS: CPT

## 2022-09-12 ENCOUNTER — CONSULT (OUTPATIENT)
Dept: PULMONOLOGY | Facility: CLINIC | Age: 72
End: 2022-09-12
Payer: MEDICARE

## 2022-09-12 VITALS
OXYGEN SATURATION: 97 % | DIASTOLIC BLOOD PRESSURE: 78 MMHG | WEIGHT: 170 LBS | HEIGHT: 64 IN | BODY MASS INDEX: 29.02 KG/M2 | RESPIRATION RATE: 18 BRPM | SYSTOLIC BLOOD PRESSURE: 128 MMHG | HEART RATE: 66 BPM

## 2022-09-12 DIAGNOSIS — D72.10 EOSINOPHILIA, UNSPECIFIED TYPE: ICD-10-CM

## 2022-09-12 DIAGNOSIS — G35 MULTIPLE SCLEROSIS (HCC): ICD-10-CM

## 2022-09-12 DIAGNOSIS — G47.19 EXCESSIVE DAYTIME SLEEPINESS: ICD-10-CM

## 2022-09-12 DIAGNOSIS — Z91.09 ENVIRONMENTAL ALLERGIES: ICD-10-CM

## 2022-09-12 DIAGNOSIS — Z87.898: ICD-10-CM

## 2022-09-12 DIAGNOSIS — R06.02 SHORTNESS OF BREATH: Primary | ICD-10-CM

## 2022-09-12 PROCEDURE — 99204 OFFICE O/P NEW MOD 45 MIN: CPT | Performed by: INTERNAL MEDICINE

## 2022-09-12 NOTE — PROGRESS NOTES
Pulmonary Outpatient Note   Chevy Edwards 67 y o  male MRN: [id number]  9/12/2022      Reason for Consultation:    Chief Complaint   Patient presents with    Shortness of Breath         Assessment/Plan:    1  Shortness of breath  Assessment & Plan:  Shortness of breath at rest   Also with nocturnal awakenings due to dyspnea  Has albuterol  No history of asthma or COPD  Former smoker quit over 30 years ago    Suspect neuromuscular weakness and sleep apnea primarily    Plan- update PFTs including MIP/MEP  Lung volumes to be performed via nitrogen washout, could not get into plethysmography box last time  Check sleep study  Continue albuterol PRN for now  Symptoms don't seem particularly consistent with asthma  Will recheck CBC w/ diff given eosinophilia on CBC last year as well as [place] RAST Allergy panel given allergies    Orders:  -     Complete PFT with Post Bronchodilator and ABG; Future  -     CBC and differential; Future    2  Excessive daytime sleepiness  Assessment & Plan:  He wakes up short of breath- happens multiple times a night- at least twice  He is gasping for air when this happens  Has a hospital bed at home, sleeps between 30 and 45 degrees  He snores  He has no headaches in the morning  Tired during the day  Naps during the day  Check sleep study    Orders:  -     Diagnostic Sleep Study; Future; Expected date: 09/13/2022    3  History of paroxysmal nocturnal dyspnea  -     Diagnostic Sleep Study; Future; Expected date: 09/13/2022    4  Eosinophilia, unspecified type  -     [facility] Allergy Panel, Adult; Future  -     CBC and differential; Future    5  Environmental allergies  -     [facility] Allergy Panel, Adult; Future    6   Multiple sclerosis ([facility] )  Assessment & Plan:  Not currently on specific therapy  He is non-ambulatory  Follows with neurology          Health Maintenance  Immunization History   Administered Date(s) Administered    INFLUENZA 11/21/2012, 10/21/2013    Influenza Split High Dose Preservative Free IM 09/24/2014, 10/02/2015, 11/07/2016, 10/25/2017    Pneumococcal Conjugate 13-Valent 11/07/2016    Pneumococcal Polysaccharide PPV23 03/07/2015        Return in about 2 months (around 11/12/2022)  History of Present Illness   HPI:  Chevy Edwards is a 67 y o  male who has multiple sclerosis, history of CVA, basilar artery aneurysm s/p coil emobolization 2015, hypertension, diabetes, anxiety, suprapubic catheter placement, non-ambulatory- in wheelchair for a few years who presents for shortness of breath  He was diagnosed with multiple sclerosis in the 1960s  Not currently on any specific MS therapy  Smoked 0 5 PPD for over 30 years quit in the 1990s  Shortness of breath off and on for years  It is getting a little worse  He is short of breath at rest and feels he can't catch his breath  He does not ambulate  He coughs occasionally- not every day  No chest pain  Occasional wheezing  Feels he is frequently clearing his throat  Has a post-nasal drip  Has heartburn- takes protonix  He has a large medication list and to be honest he is not sure what he is taking fully  His sister helps with medications and he has blister packs  No history of asthma or COPD  Has albuterol inhaler and nebulizer- he reports using this does help him  He wakes up short of breath- happens multiple times a night- at least twice  He is gasping for air when this happens  Has a hospital bed at home, sleeps between 30 and 45 degrees  He snores  He has no headaches in the morning  Tired during the day  Naps during the day          Has lost 20 + lbs over the last couple of years    Wt Readings from Last 3 Encounters:   09/12/22 77 1 kg (170 lb)   07/25/22 73 9 kg (163 lb)   05/07/22 74 3 kg (163 lb 12 8 oz)         Pulmonary history:    Childhood lung disease/premature birth: No    Family hx of lung disease: No    # of ED/hospitalizations this year: 0    Dysphagia/Reflux: Yes on PPI    Leg swelling: ankle swelling    Exposure history:    Exposure to pets/birds/farm animals: No    Social history:    Smoking: Quit in 1990s, 0 5 PPD for over 30 years    Alcohol, ilicit drugs (inhalational/ IV): No    Worked as: In the past worked as a - retired 40 years ago due to [address] gas/asbestos/silica/chemicals/bio-fuels: No        Review of Systems   Constitutional: Negative for chills and fever  HENT: Positive for postnasal drip  Negative for ear pain and sore throat  Eyes: Negative for pain and visual disturbance  Respiratory: Positive for cough, shortness of breath and wheezing  Cardiovascular: Negative for chest pain and palpitations  Gastrointestinal: Negative for abdominal pain and vomiting  Genitourinary: Negative for dysuria and hematuria  Musculoskeletal: Negative for arthralgias and back pain  Skin: Negative for color change and rash  Neurological: Positive for weakness (nonambulatory)  Negative for seizures and syncope  All other systems reviewed and are negative            Historical Information   Past Medical History:   Diagnosis Date    Acute laryngitis     Acute nonsuppurative otitis media, unspecified laterality     Arm weakness     Arthritis     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HCC)     Bladder infection     Bronchitis     Constipation     Cough     Diabetes mellitus (HCC)     Dizziness     Dysfunction of eustachian tube     Erectile dysfunction of non-organic origin     Fatigue     Glaucoma     Hiatal hernia     Hypertension     Imbalance     Leg muscle spasm     MS (multiple sclerosis) (HCC)     Nephrolithiasis     No natural teeth     Sinus pain     Spinal stenosis     Strain of thoracic region     Stroke ([address] )     Suprapubic catheter ([address] )      Past Surgical History:   Procedure Laterality Date    APPENDECTOMY      BRAIN SURGERY      Coil placed in aneurysm    CYSTOSCOPY      CYSTOSCOPY      CYSTOSCOPY 06/11/2018    CYSTOSCOPY  01/15/2021    EYE SURGERY      transscleral cyclophotocoagulation noncontact YAG laser    MYRINGOTOMY      with ventilation tube insertion    AR REMOVE BLADDER STONE,<2 5 CM N/A 5/7/2019    Procedure: CYSTOSCOPY, holmium laser litholapaxy of bladder stones, EXCHANGE OF SP TUBE;  Surgeon: Alma Villeda MD;  Location: [facility];  Service: Urology    SUPRAPUBIC CATHETER INSERTION       Family History   Problem Relation Age of Onset    Heart attack Mother     Stroke Mother     Heart attack Father     Anuerysm Father         In Stomach        Meds/Allergies     Current Outpatient Medications:     albuterol (2 5 mg/3 mL) 0 083 % nebulizer solution, Take 2 5 mg by nebulization every 6 (six) hours as needed for wheezing or shortness of breath, Disp: , Rfl:     albuterol (PROVENTIL HFA,VENTOLIN HFA) 90 mcg/act inhaler, Inhale 2 puffs every 6 (six) hours as needed for wheezing, Disp: , Rfl:     ALPRAZolam (XANAX) 0 25 mg tablet, Take 0 25 mg by mouth 2 (two) times a day as needed for anxiety or sleep , Disp: , Rfl:     amLODIPine-atorvastatin (CADUET) 10-80 MG per tablet, Take 1 tablet by mouth daily, Disp: , Rfl:     clopidogrel (PLAVIX) 75 mg tablet, Take 1 tablet (75 mg total) by mouth daily, Disp: , Rfl: 0    Dulaglutide (Trulicity) 9 22 ZW/9 6CK SOPN, Inject 0 75 mg under the skin once a week, Disp: , Rfl:     Ergocalciferol (VITAMIN D2 PO), Take 50,000 Units by mouth once a week, Disp: , Rfl:     furosemide (LASIX) 40 mg tablet, Take 40 mg by mouth daily, Disp: , Rfl:     gabapentin (NEURONTIN) 300 mg capsule, Take 1 capsule (300 mg total) by mouth 2 (two) times a day, Disp: 60 capsule, Rfl: 0    latanoprost (XALATAN) 0 005 % ophthalmic solution, Administer 1 drop to both eyes daily at bedtime, Disp: , Rfl:     melatonin 3 mg, Take 3 mg by mouth daily at bedtime as needed, Disp: , Rfl:     meloxicam (Mobic) 15 mg tablet, Take 1 tablet (15 mg total) by mouth daily Prn pain, Disp: 20 tablet, Rfl: 0    pantoprazole (PROTONIX) 40 mg tablet, TAKE 1 TABLET TWICE DAILY 30 MINUTES BEFORE BREAKFAST AND DINNER , Disp: 180 tablet, Rfl: 1    polyethylene glycol (MIRALAX) 17 g packet, Take 17 g by mouth daily as needed, Disp: , Rfl:     potassium chloride (Klor-Con) 10 mEq tablet, Take 10 mEq by mouth 2 (two) times a day, Disp: , Rfl:     propranolol (INDERAL LA) 60 mg 24 hr capsule, Take 60 mg by mouth daily, Disp: , Rfl:     senna-docusate sodium (SENOKOT S) 8 6-50 mg per tablet, Take 2 tablets by mouth daily at bedtime, Disp: , Rfl:     sertraline (ZOLOFT) 25 mg tablet, Take 25 mg by mouth daily at bedtime, Disp: , Rfl:     Acetaminophen (Mapap) 500 MG, Take 500 mg by mouth every 6 (six) hours as needed for mild pain or moderate pain (Patient not taking: No sig reported), Disp: , Rfl:     gabapentin (NEURONTIN) 300 mg capsule, Take 2 capsules (600 mg total) by mouth daily at bedtime (Patient not taking: No sig reported), Disp: 30 capsule, Rfl: 0    Lidocaine 4 % PTCH, Apply 1 patch topically daily Leave in place for 12 hours (Patient not taking: No sig reported), Disp: , Rfl:     loperamide (IMODIUM) 2 mg capsule, , Disp: , Rfl:     losartan (COZAAR) 50 mg tablet, Take 50 mg by mouth daily   (Patient not taking: No sig reported), Disp: , Rfl:     meclizine (ANTIVERT) 12 5 MG tablet, Take 12 5 mg by mouth 2 (two) times a day as needed for dizziness (Patient not taking: No sig reported), Disp: , Rfl:     Menthol, Topical Analgesic, (Biofreeze) 4 % GEL, Apply 1 application topically 4 (four) times a day as needed (Patient not taking: No sig reported), Disp: , Rfl:     metFORMIN (GLUCOPHAGE) 1000 MG tablet, Take 1,000 mg by mouth 2 (two) times a day (Patient not taking: No sig reported), Disp: , Rfl:     ondansetron (ZOFRAN) 4 mg tablet, , Disp: , Rfl:     oxyCODONE-acetaminophen (Percocet) 5-325 mg per tablet, Take 1 tablet by mouth every 6 (six) hours as needed for severe pain Max Daily Amount: 4 tablets (Patient not taking: No sig reported), Disp: 20 tablet, Rfl: 0  Allergies   Allergen Reactions    Cephalexin Rash       Vitals: Blood pressure 128/78, pulse 66, resp  rate 18, height 5' 4" (1 626 m), weight 77 1 kg (170 lb), SpO2 97 %  Body mass index is 29 18 kg/m²  Oxygen Therapy  SpO2: 97 %  Oxygen Therapy: None (Room air)      Physical Exam  Physical Exam  Vitals and nursing note reviewed  Constitutional:       Appearance: He is well-developed  Comments: In wheelchair   HENT:      Head: Normocephalic and atraumatic  Eyes:      Conjunctiva/sclera: Conjunctivae normal    Cardiovascular:      Rate and Rhythm: Normal rate and regular rhythm  Heart sounds: No murmur heard  Pulmonary:      Effort: Pulmonary effort is normal  No respiratory distress  Breath sounds: Normal breath sounds  Abdominal:      Palpations: Abdomen is soft  Tenderness: There is no abdominal tenderness  Musculoskeletal:      Cervical back: Neck supple  Right lower leg: No edema  Left lower leg: No edema  Skin:     General: Skin is warm and dry  Neurological:      Mental Status: He is alert  Comments: R leg with 2/5 strength  L leg 3/5         Labs: I have personally reviewed pertinent lab results      ABG: No results found for: PHART, CWJ4QOZ, PO2ART, HJP2UAM, A6MTOWFU, BEART, SOURCE,   BNP:   Lab Results   Component Value Date    BNP 7 03/23/2014   ,   CBC:  Lab Results   Component Value Date    WBC 11 92 (H) 06/03/2021    HGB 13 7 06/03/2021    HCT 41 3 06/03/2021    MCV 89 06/03/2021     06/03/2021    EOSPCT 6 06/03/2021    EOSABS 0 69 (H) 06/03/2021    NEUTOPHILPCT 55 06/03/2021    LYMPHOPCT 31 06/03/2021   ,   CMP:   Lab Results   Component Value Date    SODIUM 137 06/03/2021    K 3 7 06/03/2021     06/03/2021    CO2 23 06/03/2021    ANIONGAP 8 08/28/2015    BUN 6 06/03/2021    CREATININE 0 81 06/03/2021    GLUCOSE 190 (H) 10/21/2018    CALCIUM 9 0 06/03/2021    AST 12 08/07/2020    ALT 21 08/07/2020    ALKPHOS 106 08/07/2020    PROT 7 6 06/22/2016    BILITOT 0 4 06/22/2016    EGFR 89 06/03/2021   ,   PT/INR:   Lab Results   Component Value Date    INR 0 92 08/07/2020   ,   Troponin:   Lab Results   Component Value Date    TROPONINI <0 02 06/02/2021         Imaging and other studies: I have personally reviewed pertinent reports  and I have personally reviewed pertinent films in PACS      CXR- no acute disease 9/9/22 and 5/15/20    Pulmonary function testing: 10/2019    Patient unable to ambulate to plethysmography box; therefore, no pleth performed      Post bronchodilator testing performed after the administration of 2 5mg albuterol in 3cc normal saline administered via nebulizer per bronchodilator protocol      Results:  FEV1/FVC Ratio: 76 %  Forced Vital Capacity: 2 61 L    68 % predicted  FEV1: 1 99 L     68 % predicted     DLCO corrected for patients hemoglobin level: 77 %     Interpretation:     · No obstructive airflow defect      · Spirometry suggests mild restriction  Recommend checking lung volumes for further evaluation when patient is able      · Normal diffusion capacity      EKG, Pathology, and Other Studies: I have personally reviewed pertinent reports  and I have personally reviewed pertinent films in [facility]

## 2022-09-12 NOTE — ASSESSMENT & PLAN NOTE
He wakes up short of breath- happens multiple times a night- at least twice  He is gasping for air when this happens  Has a hospital bed at home, sleeps between 30 and 45 degrees  He snores  He has no headaches in the morning  Tired during the day  Naps during the day       Check sleep study

## 2022-09-12 NOTE — ASSESSMENT & PLAN NOTE
Shortness of breath at rest   Also with nocturnal awakenings due to dyspnea  Has albuterol  No history of asthma or COPD  Former smoker quit over 30 years ago    Suspect neuromuscular weakness and sleep apnea primarily    Plan- update PFTs including MIP/MEP  Lung volumes to be performed via nitrogen washout, could not get into plethysmography box last time  Check sleep study  Continue albuterol PRN for now  Symptoms don't seem particularly consistent with asthma    Will recheck CBC w/ diff given eosinophilia on CBC last year as well as NE RAST Allergy panel given allergies

## 2022-09-28 ENCOUNTER — APPOINTMENT (OUTPATIENT)
Dept: LAB | Facility: HOSPITAL | Age: 72
End: 2022-09-28
Payer: MEDICARE

## 2022-09-28 DIAGNOSIS — Z91.09 ENVIRONMENTAL ALLERGIES: ICD-10-CM

## 2022-09-28 DIAGNOSIS — D72.10 EOSINOPHILIA, UNSPECIFIED TYPE: ICD-10-CM

## 2022-09-28 DIAGNOSIS — R06.02 SHORTNESS OF BREATH: ICD-10-CM

## 2022-09-28 LAB
BASOPHILS # BLD AUTO: 0.13 THOUSANDS/ΜL (ref 0–0.1)
BASOPHILS NFR BLD AUTO: 1 % (ref 0–1)
EOSINOPHIL # BLD AUTO: 0.49 THOUSAND/ΜL (ref 0–0.61)
EOSINOPHIL NFR BLD AUTO: 4 % (ref 0–6)
ERYTHROCYTE [DISTWIDTH] IN BLOOD BY AUTOMATED COUNT: 12.3 % (ref 11.6–15.1)
HCT VFR BLD AUTO: 50.7 % (ref 36.5–49.3)
HGB BLD-MCNC: 17.2 G/DL (ref 12–17)
IMM GRANULOCYTES # BLD AUTO: 0.05 THOUSAND/UL (ref 0–0.2)
IMM GRANULOCYTES NFR BLD AUTO: 1 % (ref 0–2)
LYMPHOCYTES # BLD AUTO: 2.98 THOUSANDS/ΜL (ref 0.6–4.47)
LYMPHOCYTES NFR BLD AUTO: 27 % (ref 14–44)
MCH RBC QN AUTO: 29.8 PG (ref 26.8–34.3)
MCHC RBC AUTO-ENTMCNC: 33.9 G/DL (ref 31.4–37.4)
MCV RBC AUTO: 88 FL (ref 82–98)
MONOCYTES # BLD AUTO: 0.71 THOUSAND/ΜL (ref 0.17–1.22)
MONOCYTES NFR BLD AUTO: 6 % (ref 4–12)
NEUTROPHILS # BLD AUTO: 6.73 THOUSANDS/ΜL (ref 1.85–7.62)
NEUTS SEG NFR BLD AUTO: 61 % (ref 43–75)
NRBC BLD AUTO-RTO: 0 /100 WBCS
PLATELET # BLD AUTO: 377 THOUSANDS/UL (ref 149–390)
PMV BLD AUTO: 8.2 FL (ref 8.9–12.7)
RBC # BLD AUTO: 5.77 MILLION/UL (ref 3.88–5.62)
WBC # BLD AUTO: 11.09 THOUSAND/UL (ref 4.31–10.16)

## 2022-09-28 PROCEDURE — 86003 ALLG SPEC IGE CRUDE XTRC EA: CPT

## 2022-09-28 PROCEDURE — 36415 COLL VENOUS BLD VENIPUNCTURE: CPT

## 2022-09-28 PROCEDURE — 85025 COMPLETE CBC W/AUTO DIFF WBC: CPT

## 2022-09-28 PROCEDURE — 82785 ASSAY OF IGE: CPT

## 2022-09-29 LAB
A ALTERNATA IGE QN: <0.1 KUA/I
A FUMIGATUS IGE QN: <0.1 KUA/I
BERMUDA GRASS IGE QN: <0.1 KUA/I
BOXELDER IGE QN: <0.1 KUA/I
C HERBARUM IGE QN: <0.1 KUA/I
CAT DANDER IGE QN: <0.1 KUA/I
CMN PIGWEED IGE QN: <0.1 KUA/I
COMMON RAGWEED IGE QN: <0.1 KUA/I
COTTONWOOD IGE QN: <0.1 KUA/I
D FARINAE IGE QN: <0.1 KUA/I
D PTERONYSS IGE QN: <0.1 KUA/I
DOG DANDER IGE QN: <0.1 KUA/I
LONDON PLANE IGE QN: <0.1 KUA/I
MOUSE URINE PROT IGE QN: <0.1 KUA/I
MT JUNIPER IGE QN: <0.1 KUA/I
MUGWORT IGE QN: <0.1 KUA/I
P NOTATUM IGE QN: <0.1 KUA/I
ROACH IGE QN: <0.1 KUA/I
SHEEP SORREL IGE QN: <0.1 KUA/I
SILVER BIRCH IGE QN: <0.1 KUA/I
TIMOTHY IGE QN: <0.1 KUA/I
TOTAL IGE SMQN RAST: 166 KU/L (ref 0–113)
WALNUT IGE QN: <0.1 KUA/I
WHITE ASH IGE QN: <0.1 KUA/I
WHITE ELM IGE QN: <0.1 KUA/I
WHITE MULBERRY IGE QN: <0.1 KUA/I
WHITE OAK IGE QN: <0.1 KUA/I

## 2022-10-12 PROBLEM — A41.9 SEPSIS (HCC): Status: RESOLVED | Noted: 2021-06-02 | Resolved: 2022-10-12

## 2022-10-19 RX ORDER — ALBUTEROL SULFATE 2.5 MG/3ML
2.5 SOLUTION RESPIRATORY (INHALATION) ONCE AS NEEDED
Status: CANCELLED | OUTPATIENT
Start: 2022-10-19

## 2022-10-20 ENCOUNTER — HOSPITAL ENCOUNTER (OUTPATIENT)
Dept: PULMONOLOGY | Facility: HOSPITAL | Age: 72
Discharge: HOME | End: 2022-10-20
Attending: INTERNAL MEDICINE

## 2022-12-07 ENCOUNTER — HOSPITAL ENCOUNTER (EMERGENCY)
Facility: HOSPITAL | Age: 72
Discharge: HOME | End: 2022-12-07
Attending: EMERGENCY MEDICINE

## 2022-12-07 VITALS
HEART RATE: 102 BPM | HEIGHT: 64 IN | WEIGHT: 181.66 LBS | RESPIRATION RATE: 18 BRPM | TEMPERATURE: 97.9 F | BODY MASS INDEX: 31.01 KG/M2 | SYSTOLIC BLOOD PRESSURE: 155 MMHG | DIASTOLIC BLOOD PRESSURE: 72 MMHG | OXYGEN SATURATION: 97 %

## 2022-12-07 DIAGNOSIS — T83.010A SUPRAPUBIC CATHETER DYSFUNCTION, INITIAL ENCOUNTER (HCC): Primary | ICD-10-CM

## 2022-12-07 LAB
AMORPH URATE CRY URNS QL MICRO: ABNORMAL /HPF
BACTERIA UR QL AUTO: ABNORMAL /HPF
BILIRUB UR QL STRIP: NEGATIVE
CLARITY UR: ABNORMAL
COLOR UR: YELLOW
GLUCOSE UR STRIP-MCNC: ABNORMAL MG/DL
HGB UR QL STRIP.AUTO: ABNORMAL
KETONES UR STRIP-MCNC: NEGATIVE MG/DL
LEUKOCYTE ESTERASE UR QL STRIP: ABNORMAL
NITRITE UR QL STRIP: NEGATIVE
NON-SQ EPI CELLS URNS QL MICRO: ABNORMAL /HPF
PH UR STRIP.AUTO: 5.5 [PH]
PROT UR STRIP-MCNC: NEGATIVE MG/DL
RBC #/AREA URNS AUTO: ABNORMAL /HPF
SP GR UR STRIP.AUTO: 1.02 (ref 1–1.03)
UROBILINOGEN UR QL STRIP.AUTO: 0.2 E.U./DL
WBC #/AREA URNS AUTO: ABNORMAL /HPF

## 2022-12-07 NOTE — ED NOTES
Spoke with pt's brother about pickup for pt  Pt's family member will be here shortly to pick him up       Pancho Cintron RN  12/07/22 [id number]

## 2022-12-07 NOTE — ED PROVIDER NOTES
History  Chief Complaint   Patient presents with   • Urinary Catheter Problem     Pt arrived via EMS  Pt reports that he woke up this morning and noticed that his suprapubic catheter had fallen out  Pt denies any other c/o except feeling like he has to urinate along with bladder pain  66 y/o male woke up and noticed his suprapubic catheter came out  He's had it for years and gets it changed every month  He has lower abd  Discomfort from bladder distention  No fevers, no n/v          Prior to Admission Medications   Prescriptions Last Dose Informant Patient Reported? Taking?    ALPRAZolam (XANAX) 0 25 mg tablet   Yes No   Sig: Take 0 25 mg by mouth 2 (two) times a day as needed for anxiety or sleep    Acetaminophen (Mapap) 500 MG   Yes No   Sig: Take 500 mg by mouth every 6 (six) hours as needed for mild pain or moderate pain   Patient not taking: No sig reported   Dulaglutide (Trulicity) 7 20 YJ/9 7EP SOPN   Yes No   Sig: Inject 0 75 mg under the skin once a week   Ergocalciferol (VITAMIN D2 PO)   Yes No   Sig: Take 50,000 Units by mouth once a week   Lidocaine 4 % PTCH   Yes No   Sig: Apply 1 patch topically daily Leave in place for 12 hours   Patient not taking: No sig reported   Menthol, Topical Analgesic, (Biofreeze) 4 % GEL   Yes No   Sig: Apply 1 application topically 4 (four) times a day as needed   Patient not taking: No sig reported   albuterol (2 5 mg/3 mL) 0 083 % nebulizer solution   Yes No   Sig: Take 2 5 mg by nebulization every 6 (six) hours as needed for wheezing or shortness of breath   albuterol (PROVENTIL HFA,VENTOLIN HFA) 90 mcg/act inhaler   Yes No   Sig: Inhale 2 puffs every 6 (six) hours as needed for wheezing   amLODIPine-atorvastatin (CADUET) 10-80 MG per tablet   Yes No   Sig: Take 1 tablet by mouth daily   clopidogrel (PLAVIX) 75 mg tablet   No No   Sig: Take 1 tablet (75 mg total) by mouth daily   furosemide (LASIX) 40 mg tablet   Yes No   Sig: Take 40 mg by mouth daily   gabapentin (NEURONTIN) 300 mg capsule   No No   Sig: Take 1 capsule (300 mg total) by mouth 2 (two) times a day   gabapentin (NEURONTIN) 300 mg capsule   No No   Sig: Take 2 capsules (600 mg total) by mouth daily at bedtime   Patient not taking: No sig reported   latanoprost (XALATAN) 0 005 % ophthalmic solution   Yes No   Sig: Administer 1 drop to both eyes daily at bedtime   loperamide (IMODIUM) 2 mg capsule   Yes No   Patient not taking: No sig reported   losartan (COZAAR) 50 mg tablet   Yes No   Sig: Take 50 mg by mouth daily     Patient not taking: No sig reported   meclizine (ANTIVERT) 12 5 MG tablet   Yes No   Sig: Take 12 5 mg by mouth 2 (two) times a day as needed for dizziness   Patient not taking: No sig reported   melatonin 3 mg   Yes No   Sig: Take 3 mg by mouth daily at bedtime as needed   meloxicam (Mobic) 15 mg tablet   No No   Sig: Take 1 tablet (15 mg total) by mouth daily Prn pain   metFORMIN (GLUCOPHAGE) 1000 MG tablet   Yes No   Sig: Take 1,000 mg by mouth 2 (two) times a day   Patient not taking: No sig reported   ondansetron (ZOFRAN) 4 mg tablet   Yes No   Patient not taking: No sig reported   oxyCODONE-acetaminophen (Percocet) 5-325 mg per tablet   No No   Sig: Take 1 tablet by mouth every 6 (six) hours as needed for severe pain Max Daily Amount: 4 tablets   Patient not taking: No sig reported   pantoprazole (PROTONIX) 40 mg tablet   No No   Sig: TAKE 1 TABLET TWICE DAILY 30 MINUTES BEFORE BREAKFAST AND DINNER    polyethylene glycol (MIRALAX) 17 g packet   Yes No   Sig: Take 17 g by mouth daily as needed   potassium chloride (Klor-Con) 10 mEq tablet   Yes No   Sig: Take 10 mEq by mouth 2 (two) times a day   propranolol (INDERAL LA) 60 mg 24 hr capsule   Yes No   Sig: Take 60 mg by mouth daily   senna-docusate sodium (SENOKOT S) 8 6-50 mg per tablet   Yes No   Sig: Take 2 tablets by mouth daily at bedtime   sertraline (ZOLOFT) 25 mg tablet   Yes No   Sig: Take 25 mg by mouth daily at bedtime Facility-Administered Medications: None       Past Medical History:   Diagnosis Date   • Acute laryngitis    • Acute nonsuppurative otitis media, unspecified laterality    • Arm weakness    • Arthritis    • 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HCC)    • Bladder infection    • Bronchitis    • Constipation    • Cough    • Diabetes mellitus ([facility])    • Dizziness    • Dysfunction of eustachian tube    • Erectile dysfunction of non-organic origin    • Fatigue    • Glaucoma    • Hiatal hernia    • Hypertension    • Imbalance    • Leg muscle spasm    • MS (multiple sclerosis) ([facility])    • Nephrolithiasis    • No natural teeth    • Sinus pain    • Spinal stenosis    • Strain of thoracic region    • Stroke [facility])    • Suprapubic catheter ([facility])        Past Surgical History:   Procedure Laterality Date   • APPENDECTOMY     • BRAIN SURGERY      Coil placed in aneurysm   • CYSTOSCOPY     • CYSTOSCOPY     • CYSTOSCOPY  2018   • CYSTOSCOPY  01/15/2021   • EYE SURGERY      transscleral cyclophotocoagulation noncontact YAG laser   • MYRINGOTOMY      with ventilation tube insertion   • TN REMOVE BLADDER STONE,<2 5 CM N/A 2019    Procedure: CYSTOSCOPY, holmium laser litholapaxy of bladder stones, EXCHANGE OF SP TUBE;  Surgeon: Erlin Vogel MD;  Location: [facility];  Service: Urology   • SUPRAPUBIC CATHETER INSERTION         Family History   Problem Relation Age of Onset   • Heart attack Mother    • Stroke Mother    • Heart attack Father    • Anuerysm Father         In Stomach      I have reviewed and agree with the history as documented      E-Cigarette/Vaping   • E-Cigarette Use Never User      E-Cigarette/Vaping Substances   • Nicotine No    • THC No    • CBD No    • Flavoring No    • Other No    • Unknown No      Social History     Tobacco Use   • Smoking status: Former     Packs/day: 0 50     Years: 31 00     Pack years: 15 50     Types: Cigarettes     Start date:      Quit date:      Years since quittin 9   • Smokeless tobacco: Never   Vaping Use   • Vaping Use: Never used   Substance Use Topics   • Alcohol use: Not Currently   • Drug use: No       Review of Systems   Constitutional: Negative for appetite change, fatigue and fever  HENT: Negative for rhinorrhea and sore throat  Eyes: Negative for pain  Respiratory: Negative for cough, shortness of breath and wheezing  Cardiovascular: Negative for chest pain and leg swelling  Gastrointestinal: Negative for abdominal pain, diarrhea and vomiting  Genitourinary: Positive for difficulty urinating  Negative for flank pain  Musculoskeletal: Negative for back pain and neck pain  Skin: Negative for rash  Neurological: Negative for syncope and headaches  Psychiatric/Behavioral:        Mood normal       Physical Exam  Physical Exam  Vitals and nursing note reviewed  Constitutional:       Appearance: He is well-developed  HENT:      Head: Normocephalic and atraumatic  Right Ear: External ear normal       Left Ear: External ear normal    Eyes:      General: No scleral icterus  Extraocular Movements: Extraocular movements intact  Cardiovascular:      Rate and Rhythm: Normal rate and regular rhythm  Pulmonary:      Effort: Pulmonary effort is normal  No respiratory distress  Breath sounds: Normal breath sounds  Abdominal:      Comments: Suprapubic site looks okay - no redness, suprapubic abd  Distention and discomfort   Musculoskeletal:         General: No deformity or signs of injury  Normal range of motion  Cervical back: Normal range of motion and neck supple  Skin:     General: Skin is warm and dry  Coloration: Skin is not jaundiced or pale  Neurological:      General: No focal deficit present  Mental Status: He is alert and oriented to person, place, and time     Psychiatric:         Mood and Affect: Mood normal          Behavior: Behavior normal          Vital Signs  ED Triage Vitals   Temperature Pulse Respirations Blood Pressure SpO2   12/07/22 0506 12/07/22 0504 12/07/22 0504 12/07/22 0504 12/07/22 0504   97 9 °F (36 6 °C) 96 18 166/86 99 %      Temp Source Heart Rate Source Patient Position - Orthostatic VS BP Location FiO2 (%)   12/07/22 0506 12/07/22 0504 12/07/22 0504 12/07/22 0504 --   Oral Monitor Lying Right arm       Pain Score       --                  Vitals:    12/07/22 0504   BP: 166/86   Pulse: 96   Patient Position - Orthostatic VS: Lying         Visual Acuity      ED Medications  Medications - No data to display    Diagnostic Studies  Results Reviewed     Procedure Component Value Units Date/Time    UA w Reflex to Microscopic w Reflex to Culture [[id number]] Collected: 12/07/22 0539    Lab Status: In process Specimen: Urine, Suprapubic catheter Updated: 12/07/22 0544                 No orders to display              Procedures  Procedures         ED Course                               SBIRT 22yo+    Flowsheet Row Most Recent Value   SBIRT (23 yo +)    In order to provide better care to our patients, we are screening all of our patients for alcohol and drug use  Would it be okay to ask you these screening questions? Yes Filed at: 12/07/2022 9418   Initial Alcohol Screen: US AUDIT-C     1  How often do you have a drink containing alcohol? 0 Filed at: 12/07/2022 0512   2  How many drinks containing alcohol do you have on a typical day you are drinking? 0 Filed at: 12/07/2022 0512   3b  FEMALE Any Age, or MALE 65+: How often do you have 4 or more drinks on one occassion? 0 Filed at: 12/07/2022 7879   Audit-C Score 0 Filed at: 12/07/2022 2890   ALPESH: How many times in the past year have you    Used an illegal drug or used a prescription medication for non-medical reasons?  Never Filed at: 12/07/2022 4479                    MDM  Number of Diagnoses or Management Options     Amount and/or Complexity of Data Reviewed  Clinical lab tests: ordered and reviewed    Risk of Complications, Morbidity, and/or Mortality  Presenting problems: moderate  General comments: Suprapubic cath  Was replaced sterilly and >500mL of urine immediately came out - pt  Felt better  Stable for outpt  Follow up with urology  Disposition  Final diagnoses:   Suprapubic catheter dysfunction, initial encounter [facility])     Time reflects when diagnosis was documented in both MDM as applicable and the Disposition within this note     Time User Action Codes Description Comment    12/7/2022  5:08 AM Amber Stern Add [[id number] [id number]] Suprapubic catheter dysfunction, initial encounter [facility])       ED Disposition     ED Disposition   Discharge    Condition   Stable    Date/Time   Wed Dec 7, 2022  5:08 AM    Comment   Geovanna Presotn discharge to home/self care  Follow-up Information     Follow up With Specialties Details Why [facility],  Geriatric Medicine, Family Medicine   [address] [place] [address]      Darrell Ontiveros MD Urology   [address] [place] [address]  [phone]            Patient's Medications   Discharge Prescriptions    No medications on file       No discharge procedures on file      PDMP Review     None          ED Provider  Electronically Signed by           West Schmitt MD  12/07/22 5301

## 2022-12-08 ENCOUNTER — TELEPHONE (OUTPATIENT)
Dept: UROLOGY | Facility: MEDICAL CENTER | Age: 72
End: 2022-12-08

## 2022-12-08 ENCOUNTER — PROCEDURE VISIT (OUTPATIENT)
Dept: UROLOGY | Facility: CLINIC | Age: 72
End: 2022-12-08

## 2022-12-08 VITALS
RESPIRATION RATE: 20 BRPM | WEIGHT: 181 LBS | SYSTOLIC BLOOD PRESSURE: 140 MMHG | HEART RATE: 100 BPM | BODY MASS INDEX: 30.9 KG/M2 | DIASTOLIC BLOOD PRESSURE: 70 MMHG | HEIGHT: 64 IN

## 2022-12-08 DIAGNOSIS — N31.9 NEUROGENIC BLADDER: Primary | ICD-10-CM

## 2022-12-08 NOTE — TELEPHONE ENCOUNTER
Patient of Dr Ignacia Huitron at [address] [facility]    Patient was sent to the ER yesterday because the spt catheter fell out  [facility] went to patient's home and the catheter that they put in ER was smaller than what he had  The nurse is having a hard time putting another one in  The provider Counts include 234 beds at the [facility] would like to know how to proceed  Should he be seen in the office or does she needs to send him back to ER  Please call her ASAP  She can be reached at [phone]    Counts include 234 beds at the [facility]

## 2022-12-08 NOTE — TELEPHONE ENCOUNTER
Called spoke with Carleen Singleton states SPT ws not draining well this morning, nurse tried flushing tube and removed it  Unable to insert new one  Spoke with site nurse and patient added to schedule for today

## 2022-12-08 NOTE — PROGRESS NOTES
2022    Abrahan Mendoza  1950  [id number]    Diagnosis  Chief Complaint    Neurogenic Bladder           Patient's SPT fell out yesterday and he presented to ED for replacement of SPT  Patient had low output of urine during the night so the [facility] nurse attempted to flush the SPT this morning but could not  She removed the SPT but could not reinsert  Patient here as an urgent visit for reinsertion of SPT  Patient managed by Dr Johansen Fire life nurse to continue monthly SPT changes  Procedure: Suprapubic Tube Change         Cystostomy tube change     Date/Time 2022 2:12 PM     Performed by  Lalito Vargas RN     Authorized by Adenike Santizo MD      Universal Protocol   Consent: Verbal consent obtained  Consent given by: patient       Procedure Details   Patient tolerance: patient tolerated the procedure well with no immediate complications               Current catheter removed by [facility] nurse  Nurse could not replace the SPT    Site prepped with Betadine and unable to insert 24 fr dela cruz  A  new 16F  latex spt placed via aseptic technique without incident, 15 ml balloon inflated with sterile water  irrigated easily for straw-yellow return, mild spasm noted  Bladder drained for 550 ml foul smelling urine  Urine culture obtained and sent  Patient tolerated well  SPT plugged  Patient discussed with Dr Melodie Damico    Vitals:    22 1110   BP: 140/70   Pulse: 100   Resp: 20   Weight: 82 1 kg (181 lb)   Height: 5' 4" (1 626 m)         Lalito Vargas RN

## 2022-12-10 LAB — BACTERIA UR CULT: ABNORMAL

## 2022-12-12 NOTE — TELEPHONE ENCOUNTER
Vicki Quinones called from [employer] regarding Catheter is not working properly, The Flow is very slow and not draining as much as old Catheter  He used to fill a full Urinal, now he is getting 200 ml at a time  Patient used to be able to feel Pain and Pressure when his bladder is full so he knows when to empty Catheter, he no longer feels that  Please call Tara back to discuss

## 2022-12-12 NOTE — TELEPHONE ENCOUNTER
Called and spoke with [facility]  Patient reports his output is not the same as when he has a 24 fr SPT  Patient has a 16 fr SPT now and explained the bladder drainage will be slower than 24 fr SPT  Maria Guadalupe Reynolds states the patient is complaining his ouptut is less not the  Bladder drainage  Encouraged Maria Guadalupe Reynolds to have patient drink 32 oz in the next hour and determine what the output  Maria Guadalupe Reynolds will contact the office with progress report

## 2023-02-07 ENCOUNTER — HOSPITAL ENCOUNTER (EMERGENCY)
Facility: HOSPITAL | Age: 73
Discharge: HOME | End: 2023-02-07
Attending: EMERGENCY MEDICINE

## 2023-02-07 VITALS
DIASTOLIC BLOOD PRESSURE: 71 MMHG | RESPIRATION RATE: 20 BRPM | OXYGEN SATURATION: 97 % | SYSTOLIC BLOOD PRESSURE: 163 MMHG | HEART RATE: 110 BPM | TEMPERATURE: 98.2 F

## 2023-02-07 DIAGNOSIS — T83.010A SUPRAPUBIC CATHETER DYSFUNCTION, INITIAL ENCOUNTER (HCC): Primary | ICD-10-CM

## 2023-02-07 RX ORDER — ONDANSETRON 4 MG/1
4 TABLET, ORALLY DISINTEGRATING ORAL ONCE
Status: COMPLETED | OUTPATIENT
Start: 2023-02-07 | End: 2023-02-07

## 2023-02-07 RX ADMIN — ONDANSETRON 4 MG: 4 TABLET, ORALLY DISINTEGRATING ORAL at 02:39

## 2023-02-07 NOTE — ED PROVIDER NOTES
History  Chief Complaint   Patient presents with   • Urinary Catheter Problem     Patient states suprapubic cath replaced today at 1500  States has not drained any urine since  71-year-old male with suprapubic catheter placement for neurogenic bladder presenting for evaluation of catheter problem  Patient states around 1500 this afternoon he had his suprapubic catheter replaced by a nurse  He states after the suprapubic catheter was changed he noted no drainage from the catheter  He started with pelvic pain over the course of the day and developed some nausea  Catheter was manipulated by nursing staff prior to eval, abdominal pain improved and catheter began to drain clear urine  He still endorses some nausea  History provided by:  Patient   used: No    Urinary Catheter Problem  Location:  Suprapubic  Quality:  Abdominal discomfort/pressure  Severity:  Moderate  Onset quality:  Gradual  Timing:  Constant  Progression:  Worsening  Chronicity:  Recurrent  Context:  No drainage  Associated symptoms: abdominal pain and nausea    Associated symptoms: no chest pain, no congestion, no cough, no diarrhea, no fatigue, no fever, no headaches, no rash, no shortness of breath, no sore throat and no vomiting        Prior to Admission Medications   Prescriptions Last Dose Informant Patient Reported? Taking?    ALPRAZolam (XANAX) 0 25 mg tablet   Yes No   Sig: Take 0 25 mg by mouth 2 (two) times a day as needed for anxiety or sleep    Acetaminophen (Mapap) 500 MG   Yes No   Sig: Take 500 mg by mouth every 6 (six) hours as needed for mild pain or moderate pain   Patient not taking: No sig reported   Dulaglutide (Trulicity) 8 81 XO/4 4WM SOPN   Yes No   Sig: Inject 0 75 mg under the skin once a week   Ergocalciferol (VITAMIN D2 PO)   Yes No   Sig: Take 50,000 Units by mouth once a week   Lidocaine 4 % PTCH   Yes No   Sig: Apply 1 patch topically daily Leave in place for 12 hours   Patient not taking: No sig reported   Menthol, Topical Analgesic, (Biofreeze) 4 % GEL   Yes No   Sig: Apply 1 application topically 4 (four) times a day as needed   Patient not taking: No sig reported   albuterol (2 5 mg/3 mL) 0 083 % nebulizer solution   Yes No   Sig: Take 2 5 mg by nebulization every 6 (six) hours as needed for wheezing or shortness of breath   albuterol (PROVENTIL HFA,VENTOLIN HFA) 90 mcg/act inhaler   Yes No   Sig: Inhale 2 puffs every 6 (six) hours as needed for wheezing   amLODIPine-atorvastatin (CADUET) 10-80 MG per tablet   Yes No   Sig: Take 1 tablet by mouth daily   clopidogrel (PLAVIX) 75 mg tablet   No No   Sig: Take 1 tablet (75 mg total) by mouth daily   furosemide (LASIX) 40 mg tablet   Yes No   Sig: Take 40 mg by mouth daily   gabapentin (NEURONTIN) 300 mg capsule   No No   Sig: Take 1 capsule (300 mg total) by mouth 2 (two) times a day   gabapentin (NEURONTIN) 300 mg capsule   No No   Sig: Take 2 capsules (600 mg total) by mouth daily at bedtime   Patient not taking: No sig reported   latanoprost (XALATAN) 0 005 % ophthalmic solution   Yes No   Sig: Administer 1 drop to both eyes daily at bedtime   loperamide (IMODIUM) 2 mg capsule   Yes No   Patient not taking: No sig reported   losartan (COZAAR) 50 mg tablet   Yes No   Sig: Take 50 mg by mouth daily     Patient not taking: No sig reported   meclizine (ANTIVERT) 12 5 MG tablet   Yes No   Sig: Take 12 5 mg by mouth 2 (two) times a day as needed for dizziness   Patient not taking: No sig reported   melatonin 3 mg   Yes No   Sig: Take 3 mg by mouth daily at bedtime as needed   meloxicam (Mobic) 15 mg tablet   No No   Sig: Take 1 tablet (15 mg total) by mouth daily Prn pain   metFORMIN (GLUCOPHAGE) 1000 MG tablet   Yes No   Sig: Take 1,000 mg by mouth 2 (two) times a day   Patient not taking: No sig reported   ondansetron (ZOFRAN) 4 mg tablet   Yes No   Patient not taking: No sig reported   oxyCODONE-acetaminophen (Percocet) 5-325 mg per tablet   No No   Sig: Take 1 tablet by mouth every 6 (six) hours as needed for severe pain Max Daily Amount: 4 tablets   Patient not taking: No sig reported   pantoprazole (PROTONIX) 40 mg tablet   No No   Sig: TAKE 1 TABLET TWICE DAILY 30 MINUTES BEFORE BREAKFAST AND DINNER    polyethylene glycol (MIRALAX) 17 g packet   Yes No   Sig: Take 17 g by mouth daily as needed   potassium chloride (Klor-Con) 10 mEq tablet   Yes No   Sig: Take 10 mEq by mouth 2 (two) times a day   propranolol (INDERAL LA) 60 mg 24 hr capsule   Yes No   Sig: Take 60 mg by mouth daily   senna-docusate sodium (SENOKOT S) 8 6-50 mg per tablet   Yes No   Sig: Take 2 tablets by mouth daily at bedtime   sertraline (ZOLOFT) 25 mg tablet   Yes No   Sig: Take 25 mg by mouth daily at bedtime      Facility-Administered Medications: None       Past Medical History:   Diagnosis Date   • Acute laryngitis    • Acute nonsuppurative otitis media, unspecified laterality    • Arm weakness    • Arthritis    • 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HCC)    • Bladder infection    • Bronchitis    • Constipation    • Cough    • Diabetes mellitus (HCC)    • Dizziness    • Dysfunction of eustachian tube    • Erectile dysfunction of non-organic origin    • Fatigue    • Glaucoma    • Hiatal hernia    • Hypertension    • Imbalance    • Leg muscle spasm    • MS [facility] sclerosis[facility] ([facility])    • Nephrolithiasis    • Neurogenic bladder    • No natural teeth    • Sinus pain    • Spinal stenosis    • Strain of thoracic region    • Stroke [facility])    • Suprapubic catheter [facility])        Past Surgical History:   Procedure Laterality Date   • APPENDECTOMY     • BRAIN SURGERY      Coil placed in aneurysm   • CYSTOSCOPY     • CYSTOSCOPY     • CYSTOSCOPY  06/11/2018   • CYSTOSCOPY  01/15/2021   • EYE SURGERY      transscleral cyclophotocoagulation noncontact YAG laser   • MYRINGOTOMY      with ventilation tube insertion   • ND LITHOLAPAXY SMPL/SM <2 5 CM N/A 5/7/2019    Procedure: CYSTOSCOPY, holmium laser litholapaxy of bladder stones, EXCHANGE OF SP TUBE;  Surgeon: Alhaji Garcia MD;  Location: [facility];  Service: Urology   • SUPRAPUBIC CATHETER INSERTION         Family History   Problem Relation Age of Onset   • Heart attack Mother    • Stroke Mother    • Heart attack Father    • Anuerysm Father         In Stomach      I have reviewed and agree with the history as documented  E-Cigarette/Vaping   • E-Cigarette Use Never User      E-Cigarette/Vaping Substances   • Nicotine No    • THC No    • CBD No    • Flavoring No    • Other No    • Unknown No      Social History     Tobacco Use   • Smoking status: Former     Packs/day: 0 50     Years: 31 00     Pack years: 15 50     Types: Cigarettes     Start date:      Quit date:      Years since quittin 1   • Smokeless tobacco: Never   Vaping Use   • Vaping Use: Never used   Substance Use Topics   • Alcohol use: Not Currently   • Drug use: No       Review of Systems   Constitutional: Negative for appetite change, chills, fatigue, fever and unexpected weight change  HENT: Negative for congestion, hearing loss, sore throat and trouble swallowing  Eyes: Negative for visual disturbance  Respiratory: Negative for cough, chest tightness and shortness of breath  Cardiovascular: Negative for chest pain, palpitations and leg swelling  Gastrointestinal: Positive for abdominal pain and nausea  Negative for constipation, diarrhea and vomiting  Endocrine: Negative for polydipsia  Genitourinary: Positive for decreased urine volume  Negative for dysuria, frequency and urgency  Musculoskeletal: Negative for arthralgias  Skin: Negative for color change and rash  Neurological: Negative for dizziness, weakness, numbness and headaches  Psychiatric/Behavioral: Negative for dysphoric mood and sleep disturbance  The patient is not nervous/anxious  All other systems reviewed and are negative  Physical Exam  Physical Exam  Vitals reviewed     Constitutional: General: He is not in acute distress  Appearance: Normal appearance  He is well-developed and well-groomed  He is not ill-appearing, toxic-appearing or diaphoretic  HENT:      Head: Normocephalic and atraumatic  Right Ear: External ear normal       Left Ear: External ear normal       Nose: Nose normal  No congestion or rhinorrhea  Mouth/Throat:      Mouth: Mucous membranes are moist       Pharynx: Oropharynx is clear  No oropharyngeal exudate or posterior oropharyngeal erythema  Eyes:      General: No scleral icterus  Right eye: No discharge  Left eye: No discharge  Extraocular Movements: Extraocular movements intact  Conjunctiva/sclera: Conjunctivae normal    Cardiovascular:      Rate and Rhythm: Normal rate and regular rhythm  Pulses: Normal pulses  Heart sounds: No murmur heard  No friction rub  No gallop  Pulmonary:      Effort: Pulmonary effort is normal  No respiratory distress  Breath sounds: Normal breath sounds  No wheezing, rhonchi or rales  Abdominal:      General: Abdomen is flat  There is no distension  Palpations: Abdomen is soft  Tenderness: There is no abdominal tenderness  There is no right CVA tenderness, left CVA tenderness, guarding or rebound  Comments: Suprapubic catheter in place  Site w/o evidence of erythema, edema  No abdominal ttp or distention  Musculoskeletal:         General: No deformity  Normal range of motion  Cervical back: Normal range of motion and neck supple  Skin:     General: Skin is warm and dry  Coloration: Skin is not jaundiced or pale  Findings: No rash  Neurological:      General: No focal deficit present  Mental Status: He is alert  Psychiatric:         Mood and Affect: Mood normal          Behavior: Behavior normal  Behavior is cooperative           Vital Signs  ED Triage Vitals [02/07/23 0150]   Temperature Pulse Respirations Blood Pressure SpO2   98 2 °F (36 8 °C) (!) 125 20 (!) 199/91 99 %      Temp Source Heart Rate Source Patient Position - Orthostatic VS BP Location FiO2 (%)   Oral Monitor Lying Left arm --      Pain Score       10 - Worst Possible Pain           Vitals:    02/07/23 0150 02/07/23 0200 02/07/23 0300   BP: (!) 199/91 167/77 163/71   Pulse: (!) 125 105 (!) 110   Patient Position - Orthostatic VS: Lying  Lying         Visual Acuity      ED Medications  Medications   ondansetron (ZOFRAN-ODT) dispersible tablet 4 mg (4 mg Oral Given 2/7/23 0239)       Diagnostic Studies  Results Reviewed     None                 No orders to display              Procedures  Procedures         ED Course                                             Medical Decision Making      Patient presenting for evaluation of suprapubic catheter dysfunction  Patient had suprapubic catheter replaced today around 1500, did not have any drainage from the catheter since  He started with abdominal discomfort later this evening that has worsened and abdominal distention  He also endorses nausea  Upon evaluation symptoms had resolved except for some mild nausea  His catheter was manipulated by nursing staff prior to evaluation, the catheter began to drain clear urine and patient symptoms resolved  Abdomen is soft, NTND on exam  Suprapubic catheter site without evidence of infection  Patient feels well at this time, would like some Zofran for nausea  Prior to discharge, discharge instructions were discussed with patient at bedside  Patient was provided both verbal and written instructions  Patient is understanding of the discharge instructions and is agreeable to plan of care  Return precautions were discussed with patient bedside, patient verbalized understanding of signs and symptoms that would necessitate return to the ED  All questions were answered  Patient was comfortable with the plan of care and discharged to home  Dispo: discharge home with follow up to Urology   Patient stable, in no acute distress and non-toxic at discharge  Suprapubic catheter dysfunction, initial encounter [facility]): complicated acute illness or injury  Risk  Prescription drug management  Disposition  Final diagnoses:   Suprapubic catheter dysfunction, initial encounter [facility])     Time reflects when diagnosis was documented in both MDM as applicable and the Disposition within this note     Time User Action Codes Description Comment    2/7/2023  2:55 AM Chaz Acosta Add [T83 010A] Suprapubic catheter dysfunction, initial encounter [facility])       ED Disposition     ED Disposition   Discharge    Condition   Stable    Date/Time   Tue Feb 7, 2023  2:55 AM    Comment   Ryleeune Busing discharge to home/self care                 Follow-up Information     Follow up With Specialties Details Why Contact Info Additional [address] [facility] [address] [place] [facility] Urology Schedule an appointment as soon as possible for a visit  As needed [place] [id number]  [phone]  [facility], [address], [facility], [address] [facility], [address] [phone]          Discharge Medication List as of 2/7/2023  2:57 AM      CONTINUE these medications which have NOT CHANGED    Details   Acetaminophen (Mapap) 500 MG Take 500 mg by mouth every 6 (six) hours as needed for mild pain or moderate pain, Historical Med      albuterol (2 5 mg/3 mL) 0 083 % nebulizer solution Take 2 5 mg by nebulization every 6 (six) hours as needed for wheezing or shortness of breath, Historical Med      albuterol (PROVENTIL HFA,VENTOLIN HFA) 90 mcg/act inhaler Inhale 2 puffs every 6 (six) hours as needed for wheezing, Historical Med      ALPRAZolam (XANAX) 0 25 mg tablet Take 0 25 mg by mouth 2 (two) times a day as needed for anxiety or sleep , Historical Med      amLODIPine-atorvastatin (CADUET) 10-80 MG per tablet Take 1 tablet by mouth daily, Historical Med      clopidogrel (PLAVIX) 75 mg tablet Take 1 tablet (75 mg total) by mouth daily, Starting Sat 10/27/2018, No Print      Dulaglutide (Trulicity) 5 76 UA/2 4TM SOPN Inject 0 75 mg under the skin once a week, Historical Med      Ergocalciferol (VITAMIN D2 PO) Take 50,000 Units by mouth once a week, Historical Med      furosemide (LASIX) 40 mg tablet Take 40 mg by mouth daily, Historical Med      !! gabapentin (NEURONTIN) 300 mg capsule Take 1 capsule (300 mg total) by mouth 2 (two) times a day, Starting u 6/3/2021, Normal      !! gabapentin (NEURONTIN) 300 mg capsule Take 2 capsules (600 mg total) by mouth daily at bedtime, Starting u 6/3/2021, Normal      latanoprost (XALATAN) 0 005 % ophthalmic solution Administer 1 drop to both eyes daily at bedtime, Historical Med      Lidocaine 4 % PTCH Apply 1 patch topically daily Leave in place for 12 hours, Historical Med      loperamide (IMODIUM) 2 mg capsule Starting u 11/4/2021, Historical Med      losartan (COZAAR) 50 mg tablet Take 50 mg by mouth daily  , Historical Med      meclizine (ANTIVERT) 12 5 MG tablet Take 12 5 mg by mouth 2 (two) times a day as needed for dizziness, Historical Med      melatonin 3 mg Take 3 mg by mouth daily at bedtime as needed, Historical Med      meloxicam (Mobic) 15 mg tablet Take 1 tablet (15 mg total) by mouth daily Prn pain, Starting Sat 5/7/2022, Normal      Menthol, Topical Analgesic, (Biofreeze) 4 % GEL Apply 1 application topically 4 (four) times a day as needed, Historical Med      metFORMIN (GLUCOPHAGE) 1000 MG tablet Take 1,000 mg by mouth 2 (two) times a day, Historical Med      ondansetron (ZOFRAN) 4 mg tablet Starting u 11/4/2021, Historical Med      oxyCODONE-acetaminophen (Percocet) 5-325 mg per tablet Take 1 tablet by mouth every 6 (six) hours as needed for severe pain Max Daily Amount: 4 tablets, Starting Sat 5/7/2022, Normal      pantoprazole (PROTONIX) 40 mg tablet TAKE 1 TABLET TWICE DAILY 30 MINUTES BEFORE BREAKFAST AND DINNER , Normal      polyethylene glycol (MIRALAX) 17 g packet Take 17 g by mouth daily as needed, Historical Med      potassium chloride (Klor-Con) 10 mEq tablet Take 10 mEq by mouth 2 (two) times a day, Historical Med      propranolol (INDERAL LA) 60 mg 24 hr capsule Take 60 mg by mouth daily, Historical Med      senna-docusate sodium (SENOKOT S) 8 6-50 mg per tablet Take 2 tablets by mouth daily at bedtime, Historical Med      sertraline (ZOLOFT) 25 mg tablet Take 25 mg by mouth daily at bedtime, Historical Med       !! - Potential duplicate medications found  Please discuss with provider  No discharge procedures on file      PDMP Review     None          ED Provider  Electronically Signed by [facility] HospitalSHARI  02/07/23 0079

## 2023-02-07 NOTE — ED NOTES
Patient was assisted into the wc by nursing staff, was a double assist  Patient then was wheeled out to his family/friend who was driving patient home  Patient assisted into car by security without incident        Anny Davison RN  02/07/23 1826 [facility]  02/07/23 9670

## 2023-02-07 NOTE — ED NOTES
Patient was provided a portable phone to arrange transportation home        Marge Campuzano RN  02/07/23 [id number]

## 2023-02-13 ENCOUNTER — TELEPHONE (OUTPATIENT)
Dept: PULMONOLOGY | Facility: CLINIC | Age: 73
End: 2023-02-13

## 2023-02-13 NOTE — TELEPHONE ENCOUNTER
Left a voicemail for patient to schedule a 6 month follow up post PFT & sleep study at our [facility] office with Dr Harrison Betancourt or TARAS in April

## 2023-02-20 ENCOUNTER — TELEPHONE (OUTPATIENT)
Dept: NEUROLOGY | Facility: CLINIC | Age: 73
End: 2023-02-20

## 2023-02-24 ENCOUNTER — TELEPHONE (OUTPATIENT)
Dept: NEUROLOGY | Facility: CLINIC | Age: 73
End: 2023-02-24

## 2023-02-27 ENCOUNTER — OFFICE VISIT (OUTPATIENT)
Dept: NEUROLOGY | Facility: CLINIC | Age: 73
End: 2023-02-27

## 2023-02-27 VITALS
SYSTOLIC BLOOD PRESSURE: 143 MMHG | HEART RATE: 98 BPM | WEIGHT: 180 LBS | HEIGHT: 64 IN | TEMPERATURE: 97.1 F | DIASTOLIC BLOOD PRESSURE: 64 MMHG | BODY MASS INDEX: 30.73 KG/M2

## 2023-02-27 DIAGNOSIS — I72.5 ANEURYSM OF OTHER PRECEREBRAL ARTERIES (HCC): ICD-10-CM

## 2023-02-27 DIAGNOSIS — G35 MULTIPLE SCLEROSIS (HCC): ICD-10-CM

## 2023-02-27 DIAGNOSIS — Z86.73 PERSONAL HISTORY OF TRANSIENT ISCHEMIC ATTACK (TIA), AND CEREBRAL INFARCTION WITHOUT RESIDUAL DEFICITS: ICD-10-CM

## 2023-02-27 NOTE — PROGRESS NOTES
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 without difficulty  He attends PT  He has not had any new MS symptoms   Will continue to monitor  Diagnoses and all orders for this visit:    Multiple sclerosis ([facility] )  -     Ambulatory Referral to Neurology    Personal history of transient ischemic attack (TIA), and cerebral infarction without residual deficits  -     Ambulatory Referral to Neurology    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[facility])  -     Ambulatory Referral to Neurology         Subjective:    Sachin Lau is presenting for follow up on MS  Relevant medical history includes: MS, diabetes, neuropathy, neurogenic bladder s/p catheter, HLD, HTN, 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, previous CVA on plavix/ASA    LOV 7/25/22    To review, (per Greta Macias's last note) patient diagnosed with probable MS in the 1960s  Actually unclear diagnosis from time of presentation to our center in 2014  Patient recalls at age of 11 having chest pain and then numbness of the left toes tingling up the leg to mid chest around T6 and then down the right side in similar distribution to the right foot  Patient had loss of control of bowel and bladder at that time  He had no ability to walk and was in the hospital for over 6 months  He was only able to ambulate with crutches in his 20s, 30s, 40s, etc  Patient still uses [employer] crutches as well as a wheelchair to ambulate  Patient was only ever given ACTH in the past  He was never on IMD meds prior to coming to our center in 2014  He was told he may have had a CVA that affected his walking  Patient with suprapubic catheter placed by Dr Martinez Esters  Labs unremarkable, NMO negative   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  MRI c-spine did not reveal any lesions  MRI t-spine reveals cord lesion from T3-5; no comparison on file  Patient with longstanding dizziness; he has strong family history of brain aneurysms- his sister had 3 aneurysms, 1 niece with 3 aneurysms, 1 niece who passed away from aneurysm and 1 nephew with aneurysm; patient's father with aneurysm in his abdominal area  He had CTA in January 2015, 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  He was seen neuro-surg and underwent 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 in March 2015 by Dr Chase Temple of neuro-surg  He now follows with Dr Riya Kyle for monitoring of the aneurysm  After his updated imaging in 2020, which was stable, he was told to f/u with neurosurgery PRN  Current medications:  - No DMTs  - ASA/Plavix    Interval history as of 02/27/23, reports no changes to his symptoms and feels he is stable from [address] perspective  He did have to go to the ED in early February for a cathetar issue which was resolved in the hospital            The following portions of the patient's history were reviewed and updated as appropriate: allergies, current medications, past family history, past medical history, past social history, past surgical history and problem list          Objective:    Blood pressure 143/64, pulse 98, temperature (!) 97 1 °F (36 2 °C), height 5' 4" (1 626 m), weight 81 6 kg (180 lb)  Physical Exam  Vitals and nursing note reviewed  HENT:      Head: Normocephalic and atraumatic  Eyes:      Extraocular Movements: Extraocular movements intact  Pupils: Pupils are equal, round, and reactive to light  Cardiovascular:      Rate and Rhythm: Normal rate  Pulmonary:      Effort: Pulmonary effort is normal    Skin:     General: Skin is warm  Neurological:      Mental Status: He is alert     Psychiatric:         Mood and Affect: Mood normal          Speech: Speech normal  Behavior: Behavior normal          Neurological Exam  Mental Status  Awake, alert and oriented to person, place and time  Alert  Recent and remote memory are intact  Speech is normal  Language is fluent with no aphasia  Attention and concentration are normal     Cranial Nerves  CN II: Visual fields full to confrontation  CN III, IV, VI: Extraocular movements intact bilaterally  Pupils equal round and reactive to light bilaterally  CN V: Facial sensation is normal   CN VII: Full and symmetric facial movement  CN VIII: Hearing is normal   CN IX, X: Palate elevates symmetrically    Motor  Decreased muscle bulk throughout  No fasciculations present  Increased muscle tone  No abnormal involuntary movements  Lower extremities are mostly contracted and he has difficulty with the motor exam, he is able to stand without falling but has difficulty ambulating and mostly uses a wheelchair  Sensory  Light touch is normal in upper and lower extremities  Coordination  Right: Finger-to-nose normal Left: Finger-to-nose normal     Gait    Uses wheelchair  ROS:    Review of Systems   Constitutional: Negative  Negative for appetite change and fever  HENT: Negative  Negative for hearing loss, tinnitus, trouble swallowing and voice change  Eyes: Negative  Negative for photophobia, pain and visual disturbance  Respiratory: Negative  Negative for shortness of breath  Cardiovascular: Negative  Negative for palpitations  Gastrointestinal: Negative  Negative for nausea and vomiting  Endocrine: Negative  Negative for cold intolerance  Genitourinary: Negative  Negative for dysuria, frequency and urgency  Musculoskeletal: Negative  Negative for gait problem, myalgias and neck pain  [place] off the edge of his bed couple months ago   Skin: Negative  Negative for rash  Allergic/Immunologic: Negative  Neurological: Positive for weakness   Negative for dizziness, tremors, seizures, syncope, facial asymmetry, speech difficulty, light-headedness, numbness and headaches  Patient uses WC almost all the time to get around   Hematological: Negative  Does not bruise/bleed easily  Psychiatric/Behavioral: Negative  Negative for confusion, hallucinations and sleep disturbance  All other systems reviewed and are negative  Patient unsure of medications, says he takes a lot of pills    Review of systems as documented by the MA was reviewed in full by myself, Odalis Zamora MD      More than 50% of this time spent with the patient was devoted to counseling and coordination of care  Issues addressed during this clinic visit included overall management, medication counseling or monitoring (including adverse effects, side effects and risks of medications)

## 2023-02-27 NOTE — ASSESSMENT & PLAN NOTE
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

## 2023-04-10 ENCOUNTER — APPOINTMENT (EMERGENCY)
Dept: RADIOLOGY | Facility: HOSPITAL | Age: 73
End: 2023-04-10

## 2023-04-10 ENCOUNTER — HOSPITAL ENCOUNTER (INPATIENT)
Facility: HOSPITAL | Age: 73
LOS: 8 days | Discharge: HOME | End: 2023-04-18
Attending: EMERGENCY MEDICINE | Admitting: INTERNAL MEDICINE

## 2023-04-10 ENCOUNTER — APPOINTMENT (EMERGENCY)
Dept: CT IMAGING | Facility: HOSPITAL | Age: 73
End: 2023-04-10

## 2023-04-10 DIAGNOSIS — E87.1 HYPONATREMIA: Primary | ICD-10-CM

## 2023-04-10 DIAGNOSIS — N39.0 URINARY TRACT INFECTION: ICD-10-CM

## 2023-04-10 DIAGNOSIS — G35 MULTIPLE SCLEROSIS (HCC): ICD-10-CM

## 2023-04-10 PROBLEM — G93.41 ACUTE METABOLIC ENCEPHALOPATHY: Status: ACTIVE | Noted: 2023-04-10

## 2023-04-10 LAB
ALBUMIN SERPL BCP-MCNC: 3.9 G/DL (ref 3.5–5)
ALP SERPL-CCNC: 106 U/L (ref 34–104)
ALT SERPL W P-5'-P-CCNC: 14 U/L (ref 7–52)
AMORPH URATE CRY URNS QL MICRO: ABNORMAL /HPF
ANION GAP SERPL CALCULATED.3IONS-SCNC: 8 MMOL/L (ref 4–13)
ANION GAP SERPL CALCULATED.3IONS-SCNC: 9 MMOL/L (ref 4–13)
AST SERPL W P-5'-P-CCNC: 13 U/L (ref 13–39)
ATRIAL RATE: 90 BPM
BACTERIA UR QL AUTO: ABNORMAL /HPF
BASOPHILS # BLD AUTO: 0.11 THOUSANDS/ΜL (ref 0–0.1)
BASOPHILS NFR BLD AUTO: 1 % (ref 0–1)
BILIRUB SERPL-MCNC: 1.2 MG/DL (ref 0.2–1)
BILIRUB UR QL STRIP: NEGATIVE
BUN SERPL-MCNC: 10 MG/DL (ref 5–25)
BUN SERPL-MCNC: 10 MG/DL (ref 5–25)
CALCIUM SERPL-MCNC: 9.7 MG/DL (ref 8.4–10.2)
CALCIUM SERPL-MCNC: 9.9 MG/DL (ref 8.4–10.2)
CHLORIDE SERPL-SCNC: 92 MMOL/L (ref 96–108)
CHLORIDE SERPL-SCNC: 92 MMOL/L (ref 96–108)
CLARITY UR: ABNORMAL
CO2 SERPL-SCNC: 25 MMOL/L (ref 21–32)
CO2 SERPL-SCNC: 26 MMOL/L (ref 21–32)
COLOR UR: YELLOW
CREAT SERPL-MCNC: 0.89 MG/DL (ref 0.6–1.3)
CREAT SERPL-MCNC: 0.92 MG/DL (ref 0.6–1.3)
EOSINOPHIL # BLD AUTO: 0.02 THOUSAND/ΜL (ref 0–0.61)
EOSINOPHIL NFR BLD AUTO: 0 % (ref 0–6)
ERYTHROCYTE [DISTWIDTH] IN BLOOD BY AUTOMATED COUNT: 13.2 % (ref 11.6–15.1)
GFR SERPL CREATININE-BSD FRML MDRD: 82 ML/MIN/1.73SQ M
GFR SERPL CREATININE-BSD FRML MDRD: 84 ML/MIN/1.73SQ M
GLUCOSE SERPL-MCNC: 145 MG/DL (ref 65–140)
GLUCOSE SERPL-MCNC: 156 MG/DL (ref 65–140)
GLUCOSE SERPL-MCNC: 189 MG/DL (ref 65–140)
GLUCOSE UR STRIP-MCNC: ABNORMAL MG/DL
HCT VFR BLD AUTO: 45.3 % (ref 36.5–49.3)
HGB BLD-MCNC: 15.7 G/DL (ref 12–17)
HGB UR QL STRIP.AUTO: ABNORMAL
IMM GRANULOCYTES # BLD AUTO: 0.14 THOUSAND/UL (ref 0–0.2)
IMM GRANULOCYTES NFR BLD AUTO: 1 % (ref 0–2)
KETONES UR STRIP-MCNC: ABNORMAL MG/DL
LACTATE SERPL-SCNC: 1.3 MMOL/L (ref 0.5–2)
LEUKOCYTE ESTERASE UR QL STRIP: ABNORMAL
LYMPHOCYTES # BLD AUTO: 1.41 THOUSANDS/ΜL (ref 0.6–4.47)
LYMPHOCYTES NFR BLD AUTO: 8 % (ref 14–44)
MCH RBC QN AUTO: 29.5 PG (ref 26.8–34.3)
MCHC RBC AUTO-ENTMCNC: 34.7 G/DL (ref 31.4–37.4)
MCV RBC AUTO: 85 FL (ref 82–98)
MONOCYTES # BLD AUTO: 1.12 THOUSAND/ΜL (ref 0.17–1.22)
MONOCYTES NFR BLD AUTO: 7 % (ref 4–12)
NEUTROPHILS # BLD AUTO: 14.03 THOUSANDS/ΜL (ref 1.85–7.62)
NEUTS SEG NFR BLD AUTO: 83 % (ref 43–75)
NITRITE UR QL STRIP: POSITIVE
NON-SQ EPI CELLS URNS QL MICRO: ABNORMAL /HPF
NRBC BLD AUTO-RTO: 0 /100 WBCS
OSMOLALITY UR/SERPL-RTO: 278 MMOL/KG (ref 282–298)
OTHER STN SPEC: ABNORMAL
P AXIS: 57 DEGREES
PH UR STRIP.AUTO: 6 [PH]
PLATELET # BLD AUTO: 290 THOUSANDS/UL (ref 149–390)
PLATELET # BLD AUTO: 325 THOUSANDS/UL (ref 149–390)
PMV BLD AUTO: 7.9 FL (ref 8.9–12.7)
PMV BLD AUTO: 8 FL (ref 8.9–12.7)
POTASSIUM SERPL-SCNC: 4.1 MMOL/L (ref 3.5–5.3)
POTASSIUM SERPL-SCNC: 4.6 MMOL/L (ref 3.5–5.3)
PR INTERVAL: 168 MS
PROCALCITONIN SERPL-MCNC: 0.47 NG/ML
PROT SERPL-MCNC: 7.9 G/DL (ref 6.4–8.4)
PROT UR STRIP-MCNC: ABNORMAL MG/DL
QRS AXIS: 71 DEGREES
QRSD INTERVAL: 90 MS
QT INTERVAL: 364 MS
QTC INTERVAL: 445 MS
RBC # BLD AUTO: 5.32 MILLION/UL (ref 3.88–5.62)
RBC #/AREA URNS AUTO: ABNORMAL /HPF
SODIUM 24H UR-SCNC: 21 MOL/L
SODIUM SERPL-SCNC: 126 MMOL/L (ref 135–147)
SODIUM SERPL-SCNC: 126 MMOL/L (ref 135–147)
SP GR UR STRIP.AUTO: 1.02 (ref 1–1.03)
T WAVE AXIS: 33 DEGREES
TSH SERPL DL<=0.05 MIU/L-ACNC: 2.86 UIU/ML (ref 0.45–4.5)
UROBILINOGEN UR QL STRIP.AUTO: 0.2 E.U./DL
VENTRICULAR RATE: 90 BPM
WBC # BLD AUTO: 16.83 THOUSAND/UL (ref 4.31–10.16)
WBC #/AREA URNS AUTO: ABNORMAL /HPF

## 2023-04-10 RX ORDER — INSULIN LISPRO 100 [IU]/ML
1-5 INJECTION, SOLUTION INTRAVENOUS; SUBCUTANEOUS
Status: DISCONTINUED | OUTPATIENT
Start: 2023-04-11 | End: 2023-04-18 | Stop reason: HOSPADM

## 2023-04-10 RX ORDER — LEVOFLOXACIN 5 MG/ML
750 INJECTION, SOLUTION INTRAVENOUS ONCE
Status: COMPLETED | OUTPATIENT
Start: 2023-04-10 | End: 2023-04-10

## 2023-04-10 RX ORDER — ENOXAPARIN SODIUM 100 MG/ML
40 INJECTION SUBCUTANEOUS DAILY
Status: DISCONTINUED | OUTPATIENT
Start: 2023-04-11 | End: 2023-04-18 | Stop reason: HOSPADM

## 2023-04-10 RX ORDER — ATORVASTATIN CALCIUM 80 MG/1
80 TABLET, FILM COATED ORAL
Status: DISCONTINUED | OUTPATIENT
Start: 2023-04-11 | End: 2023-04-18 | Stop reason: HOSPADM

## 2023-04-10 RX ORDER — LATANOPROST 50 UG/ML
1 SOLUTION/ DROPS OPHTHALMIC
Status: DISCONTINUED | OUTPATIENT
Start: 2023-04-10 | End: 2023-04-18 | Stop reason: HOSPADM

## 2023-04-10 RX ORDER — CLOPIDOGREL BISULFATE 75 MG/1
75 TABLET ORAL DAILY
Status: DISCONTINUED | OUTPATIENT
Start: 2023-04-11 | End: 2023-04-18 | Stop reason: HOSPADM

## 2023-04-10 RX ORDER — KETOROLAC TROMETHAMINE 30 MG/ML
15 INJECTION, SOLUTION INTRAMUSCULAR; INTRAVENOUS ONCE
Status: COMPLETED | OUTPATIENT
Start: 2023-04-10 | End: 2023-04-10

## 2023-04-10 RX ORDER — AMOXICILLIN 250 MG
2 CAPSULE ORAL
Status: DISCONTINUED | OUTPATIENT
Start: 2023-04-10 | End: 2023-04-18 | Stop reason: HOSPADM

## 2023-04-10 RX ORDER — AMLODIPINE BESYLATE 10 MG/1
10 TABLET ORAL
Status: DISCONTINUED | OUTPATIENT
Start: 2023-04-11 | End: 2023-04-18 | Stop reason: HOSPADM

## 2023-04-10 RX ORDER — LANOLIN ALCOHOL/MO/W.PET/CERES
3 CREAM (GRAM) TOPICAL
Status: DISCONTINUED | OUTPATIENT
Start: 2023-04-10 | End: 2023-04-18 | Stop reason: HOSPADM

## 2023-04-10 RX ORDER — SERTRALINE HYDROCHLORIDE 25 MG/1
25 TABLET, FILM COATED ORAL
Status: DISCONTINUED | OUTPATIENT
Start: 2023-04-10 | End: 2023-04-18 | Stop reason: HOSPADM

## 2023-04-10 RX ORDER — FUROSEMIDE 40 MG/1
40 TABLET ORAL DAILY
Status: DISCONTINUED | OUTPATIENT
Start: 2023-04-11 | End: 2023-04-11

## 2023-04-10 RX ORDER — LEVOFLOXACIN 5 MG/ML
750 INJECTION, SOLUTION INTRAVENOUS EVERY 24 HOURS
Status: DISCONTINUED | OUTPATIENT
Start: 2023-04-11 | End: 2023-04-11

## 2023-04-10 RX ORDER — ALPRAZOLAM 0.25 MG/1
0.25 TABLET ORAL 2 TIMES DAILY PRN
Status: DISCONTINUED | OUTPATIENT
Start: 2023-04-10 | End: 2023-04-11

## 2023-04-10 RX ORDER — GABAPENTIN 300 MG/1
300 CAPSULE ORAL 2 TIMES DAILY
Status: DISCONTINUED | OUTPATIENT
Start: 2023-04-11 | End: 2023-04-18 | Stop reason: HOSPADM

## 2023-04-10 RX ORDER — PROPRANOLOL HCL 60 MG
60 CAPSULE, EXTENDED RELEASE 24HR ORAL DAILY
Status: DISCONTINUED | OUTPATIENT
Start: 2023-04-11 | End: 2023-04-18 | Stop reason: HOSPADM

## 2023-04-10 RX ORDER — ACETAMINOPHEN 325 MG/1
650 TABLET ORAL EVERY 6 HOURS PRN
Status: DISCONTINUED | OUTPATIENT
Start: 2023-04-10 | End: 2023-04-18 | Stop reason: HOSPADM

## 2023-04-10 RX ORDER — GABAPENTIN 300 MG/1
600 CAPSULE ORAL
Status: DISCONTINUED | OUTPATIENT
Start: 2023-04-10 | End: 2023-04-18 | Stop reason: HOSPADM

## 2023-04-10 RX ORDER — ALBUTEROL SULFATE 2.5 MG/3ML
2.5 SOLUTION RESPIRATORY (INHALATION) EVERY 6 HOURS PRN
Status: DISCONTINUED | OUTPATIENT
Start: 2023-04-10 | End: 2023-04-18 | Stop reason: HOSPADM

## 2023-04-10 RX ADMIN — KETOROLAC TROMETHAMINE 15 MG: 30 INJECTION, SOLUTION INTRAMUSCULAR; INTRAVENOUS at 17:52

## 2023-04-10 RX ADMIN — IOHEXOL 100 ML: 350 INJECTION, SOLUTION INTRAVENOUS at 16:38

## 2023-04-10 RX ADMIN — LEVOFLOXACIN 750 MG: 750 INJECTION, SOLUTION INTRAVENOUS at 17:27

## 2023-04-10 RX ADMIN — SODIUM CHLORIDE 500 ML: 0.9 INJECTION, SOLUTION INTRAVENOUS at 23:13

## 2023-04-10 RX ADMIN — SENNOSIDES AND DOCUSATE SODIUM 2 TABLET: 8.6; 5 TABLET ORAL at 22:21

## 2023-04-10 RX ADMIN — SERTRALINE HYDROCHLORIDE 25 MG: 25 TABLET ORAL at 22:21

## 2023-04-10 RX ADMIN — LATANOPROST 1 DROP: 50 SOLUTION OPHTHALMIC at 22:21

## 2023-04-10 RX ADMIN — GABAPENTIN 600 MG: 300 CAPSULE ORAL at 22:21

## 2023-04-10 NOTE — ASSESSMENT & PLAN NOTE
· Patient with history of longstanding multiple sclerosis  · Chronic lower extremity weakness, wheelchair-bound  · On gabapentin 300 mg twice daily and 600 mg at bedtime

## 2023-04-10 NOTE — ASSESSMENT & PLAN NOTE
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

## 2023-04-10 NOTE — ASSESSMENT & PLAN NOTE
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

## 2023-04-10 NOTE — ED ATTENDING ATTESTATION
4/10/2023  ILinda DO, saw and evaluated the patient  I have discussed the patient with the resident/non-physician practitioner and agree with the resident's/non-physician practitioner's findings, Plan of Care, and MDM as documented in the resident's/non-physician practitioner's note, except where noted  All available labs and Radiology studies were reviewed  I was present for key portions of any procedure(s) performed by the resident/non-physician practitioner and I was immediately available to provide assistance  At this point I agree with the current assessment done in the Emergency Department  I have conducted an independent evaluation of this patient a history and physical is as follows:    68 yo M h/o MS, neurogenic bladder s/p suprapubic dela cruz, aneurysm of basilar artery s/p repair, previous CVA; presenting for evaluation of confusion  Pt lives at home with siblings and has visiting nurses  He reports feeling a little confused and this is why the visiting nurse called EMS  He is oriented to person/place, not time (states he typically is), but has no other new deficits (chronic b/l LE weakness/numbness)  Denies recent illness, recent falls, HA, CP, SOB, cough/URI sx, abdominal pain  Chronic suprapubic catheter, nursing care for, he believes changed a few days ago    11/27/20 MRA head  Stable findings of stent assisted basilar terminus aneurysm coiling without findings to suggest residual/recurrent aneurysm      MDM: 68 yo M presenting from home for evaluation of confusion, not oriented to time, no other complaints- will get CTA head/neck to r/o intracranial bleed/mass/abn, labs to r/o ARMANDO/metabolic derangement, exchange suprapubic dela cruz and get UA to r/o UTI        ED Course         Critical Care Time  Procedures

## 2023-04-10 NOTE — ASSESSMENT & PLAN NOTE
This is a 62-year-old male with history of longstanding multiple sclerosis, wheelchair-bound, hypertension, hyperlipidemia, neurogenic bladder with chronic suprapubic catheter in place senting with confusion  Patient gets his suprapubic catheter drained every day lives at home with his siblings  Denies any fever, chills, abdominal pain, nausea or vomiting      · Urinalysis reveals moderate leukocytes, positive nitrite, moderate blood, innumerable WBC and bacteria  · He has history of E  coli, allergic to cephalexin, continue with Levaquin  · Follow-up cultures

## 2023-04-10 NOTE — ED PROVIDER NOTES
"History  Chief Complaint   Patient presents with   • Altered Mental Status     Pt brought in via EMS from home  Per EMS pt has a nurse at home that reported pt has altered mental status and confusion  Pt is oriented to person but not time  Pt reports feeling drowsy and out of it  Denies chest pain/sob  79-year-old male past medical history significant for multiple sclerosis causing neurogenic bladder with suprapubic catheter in place that presents ED today for confusion  History provided initially by EMS given the patient's confusion  EMS reports that patient has a suprapubic catheter that does not have a bag  He gets it drained once a day by nursing staff that visits his home  He lives at home with his siblings  To me, the patient reports that his brain feels \"funny\"  He is alert to person and place but is not alert to time  To me he is currently denying any pain  Denies any chest pain, shortness breath, nausea vomiting, abdominal pain  He is unable to tell me how often he gets his suprapubic catheter drain  Prior to Admission Medications   Prescriptions Last Dose Informant Patient Reported? Taking?    ALPRAZolam (XANAX) 0 25 mg tablet   Yes No   Sig: Take 0 25 mg by mouth 2 (two) times a day as needed for anxiety or sleep    Acetaminophen (Mapap) 500 MG   Yes No   Sig: Take 500 mg by mouth every 6 (six) hours as needed for mild pain or moderate pain   Patient not taking: Reported on 7/25/2022   Dulaglutide (Trulicity) 8 65 JH/6 3PL SOPN   Yes No   Sig: Inject 0 75 mg under the skin once a week   Ergocalciferol (VITAMIN D2 PO)   Yes No   Sig: Take 50,000 Units by mouth once a week   Lidocaine 4 % PTCH   Yes No   Sig: Apply 1 patch topically daily Leave in place for 12 hours   Patient not taking: No sig reported   Menthol, Topical Analgesic, (Biofreeze) 4 % GEL   Yes No   Sig: Apply 1 application topically 4 (four) times a day as needed   Patient not taking: No sig reported   albuterol (2 5 " mg/3 mL) 0 083 % nebulizer solution   Yes No   Sig: Take 2 5 mg by nebulization every 6 (six) hours as needed for wheezing or shortness of breath   albuterol (PROVENTIL HFA,VENTOLIN HFA) 90 mcg/act inhaler   Yes No   Sig: Inhale 2 puffs every 6 (six) hours as needed for wheezing   amLODIPine-atorvastatin (CADUET) 10-80 MG per tablet   Yes No   Sig: Take 1 tablet by mouth daily   clopidogrel (PLAVIX) 75 mg tablet   No No   Sig: Take 1 tablet (75 mg total) by mouth daily   furosemide (LASIX) 40 mg tablet   Yes No   Sig: Take 40 mg by mouth daily   gabapentin (NEURONTIN) 300 mg capsule   No No   Sig: Take 1 capsule (300 mg total) by mouth 2 (two) times a day   gabapentin (NEURONTIN) 300 mg capsule   No No   Sig: Take 2 capsules (600 mg total) by mouth daily at bedtime   Patient not taking: No sig reported   latanoprost (XALATAN) 0 005 % ophthalmic solution   Yes No   Sig: Administer 1 drop to both eyes daily at bedtime   loperamide (IMODIUM) 2 mg capsule   Yes No   Patient not taking: Reported on 1/24/2022   losartan (COZAAR) 50 mg tablet   Yes No   Sig: Take 50 mg by mouth daily     Patient not taking: No sig reported   meclizine (ANTIVERT) 12 5 MG tablet   Yes No   Sig: Take 12 5 mg by mouth 2 (two) times a day as needed for dizziness   Patient not taking: No sig reported   melatonin 3 mg   Yes No   Sig: Take 3 mg by mouth daily at bedtime as needed   meloxicam (Mobic) 15 mg tablet   No No   Sig: Take 1 tablet (15 mg total) by mouth daily Prn pain   metFORMIN (GLUCOPHAGE) 1000 MG tablet   Yes No   Sig: Take 1,000 mg by mouth 2 (two) times a day   Patient not taking: Reported on 7/25/2022   ondansetron (ZOFRAN) 4 mg tablet   Yes No   Patient not taking: No sig reported   oxyCODONE-acetaminophen (Percocet) 5-325 mg per tablet   No No   Sig: Take 1 tablet by mouth every 6 (six) hours as needed for severe pain Max Daily Amount: 4 tablets   Patient not taking: Reported on 7/25/2022   pantoprazole (PROTONIX) 40 mg tablet No No   Sig: TAKE 1 TABLET TWICE DAILY 30 MINUTES BEFORE BREAKFAST AND DINNER    polyethylene glycol (MIRALAX) 17 g packet   Yes No   Sig: Take 17 g by mouth daily as needed   potassium chloride (Klor-Con) 10 mEq tablet   Yes No   Sig: Take 10 mEq by mouth 2 (two) times a day   propranolol (INDERAL LA) 60 mg 24 hr capsule   Yes No   Sig: Take 60 mg by mouth daily   senna-docusate sodium (SENOKOT S) 8 6-50 mg per tablet   Yes No   Sig: Take 2 tablets by mouth daily at bedtime   sertraline (ZOLOFT) 25 mg tablet   Yes No   Sig: Take 25 mg by mouth daily at bedtime      Facility-Administered Medications: None       Past Medical History:   Diagnosis Date   • Acute laryngitis    • Acute nonsuppurative otitis media, unspecified laterality    • Arm weakness    • Arthritis    • 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HCC)    • Bladder infection    • Bronchitis    • Constipation    • Cough    • Diabetes mellitus (HCC)    • Dizziness    • Dysfunction of eustachian tube    • Erectile dysfunction of non-organic origin    • Fatigue    • Glaucoma    • Hiatal hernia    • Hypertension    • Imbalance    • Leg muscle spasm    • MS (multiple sclerosis) (HCC)    • Nephrolithiasis    • Neurogenic bladder    • No natural teeth    • Sinus pain    • Spinal stenosis    • Strain of thoracic region    • Stroke [facility])    • Suprapubic catheter ([facility] [address] )        Past Surgical History:   Procedure Laterality Date   • APPENDECTOMY     • BRAIN SURGERY      Coil placed in aneurysm   • CYSTOSCOPY     • CYSTOSCOPY     • CYSTOSCOPY  06/11/2018   • CYSTOSCOPY  01/15/2021   • EYE SURGERY      transscleral cyclophotocoagulation noncontact YAG laser   • MYRINGOTOMY      with ventilation tube insertion   • WA LITHOLAPAXY SMPL/SM <2 5 CM N/A 5/7/2019    Procedure: CYSTOSCOPY, holmium laser litholapaxy of bladder stones, EXCHANGE OF SP TUBE;  Surgeon: Deepti Mccormack MD;  Location: [facility];  Service: Urology   • SUPRAPUBIC CATHETER INSERTION         Family History   Problem Relation Age of Onset   • Heart attack Mother    • Stroke Mother    • Heart attack Father    • Anuerysm Father         In Stomach      I have reviewed and agree with the history as documented  E-Cigarette/Vaping   • E-Cigarette Use Never User      E-Cigarette/Vaping Substances   • Nicotine No    • THC No    • CBD No    • Flavoring No    • Other No    • Unknown No      Social History     Tobacco Use   • Smoking status: Former     Packs/day: 0 50     Years: 31 00     Pack years: 15 50     Types: Cigarettes     Start date:      Quit date:      Years since quittin 2   • Smokeless tobacco: Never   Vaping Use   • Vaping Use: Never used   Substance Use Topics   • Alcohol use: Not Currently   • Drug use: No        Review of Systems   Unable to perform ROS: Mental status change       Physical Exam  ED Triage Vitals   Temperature Pulse Respirations Blood Pressure SpO2   04/10/23 1410 04/10/23 1410 04/10/23 1410 04/10/23 1410 04/10/23 1410   98 4 °F (36 9 °C) 90 18 136/63 96 %      Temp Source Heart Rate Source Patient Position - Orthostatic VS BP Location FiO2 (%)   04/10/23 1410 04/10/23 1410 04/10/23 1410 04/10/23 1410 --   Oral Monitor Lying Right arm       Pain Score       04/10/23 1752       10 - Worst Possible Pain             Orthostatic Vital Signs  Vitals:    04/10/23 1410 04/10/23 1542 04/10/23 1700 04/10/23 1758   BP: 136/63 140/66 145/65 160/65   Pulse: 90 93 92 94   Patient Position - Orthostatic VS: Lying Lying  Lying       Physical Exam  Vitals and nursing note reviewed  Constitutional:       General: He is not in acute distress  Appearance: Normal appearance  He is not ill-appearing  HENT:      Head: Normocephalic and atraumatic  Right Ear: External ear normal       Left Ear: External ear normal       Nose: Nose normal       Mouth/Throat:      Mouth: Mucous membranes are moist       Pharynx: Oropharynx is clear  No oropharyngeal exudate     Eyes:      General:         Right eye: No discharge  Left eye: No discharge  Extraocular Movements: Extraocular movements intact  Conjunctiva/sclera: Conjunctivae normal       Pupils: Pupils are equal, round, and reactive to light  Cardiovascular:      Rate and Rhythm: Normal rate and regular rhythm  Pulses: Normal pulses  Heart sounds: Normal heart sounds  No murmur heard  Pulmonary:      Effort: Pulmonary effort is normal  No respiratory distress  Breath sounds: Normal breath sounds  No wheezing  Abdominal:      General: Abdomen is flat  Bowel sounds are normal  There is no distension  Palpations: Abdomen is soft  Tenderness: There is no abdominal tenderness  Genitourinary:     Comments: Suprapubic catheter in place  No Cage bag attached to suprapubic catheter  Musculoskeletal:         General: No swelling  Normal range of motion  Cervical back: Normal range of motion  No rigidity  Skin:     General: Skin is warm and dry  Capillary Refill: Capillary refill takes less than 2 seconds  Neurological:      Mental Status: He is alert  Comments: Cranial nerves II through XII are intact  Patient alert to person and place but not to time  Generalized weakness in the upper and lower extremities but no focal weakness  Sensation intact bilaterally  Psychiatric:         Mood and Affect: Mood normal          Behavior: Behavior normal          ED Medications  Medications   levofloxacin (LEVAQUIN) IVPB (premix in dextrose) 750 mg 150 mL (750 mg Intravenous New Bag 4/10/23 1727)   iohexol (OMNIPAQUE) 350 MG/ML injection (SINGLE-DOSE) 100 mL (100 mL Intravenous Given 4/10/23 1638)   ketorolac (TORADOL) injection 15 mg (15 mg Intravenous Given 4/10/23 1752)       Diagnostic Studies  Results Reviewed     Procedure Component Value Units Date/Time    Blood culture #2 [[id number]] Collected: 04/10/23 1723    Lab Status:  In process Specimen: Blood from Hand, Left Updated: 04/10/23 1823    Procalcitonin [[id number]]  (Abnormal) Collected: 04/10/23 1712    Lab Status: Final result Specimen: Blood from Arm, Right Updated: 04/10/23 1744     Procalcitonin 0 47 ng/ml     Lactic acid, plasma (w/reflex if result > 2 0) [[id number]]  (Normal) Collected: 04/10/23 1712    Lab Status: Final result Specimen: Blood from Arm, Right Updated: 04/10/23 1744     LACTIC ACID 1 3 mmol/L     Narrative:      Result may be elevated if tourniquet was used during collection  Blood culture #1 [[id number]] Collected: 04/10/23 1712    Lab Status: In process Specimen: Blood from Arm, Right Updated: 04/10/23 1717    Urine Microscopic [[id number]]  (Abnormal) Collected: 04/10/23 1542    Lab Status: Final result Specimen: Urine, Suprapubic catheter Updated: 04/10/23 1606     RBC, UA 10-20 /hpf      WBC, UA Innumerable /hpf      Epithelial Cells None Seen /hpf      Bacteria, UA Innumerable /hpf      AMORPH URATES Moderate /hpf      OTHER OBSERVATIONS WBCs Clumped    Urine culture [[id number]] Collected: 04/10/23 1542    Lab Status:  In process Specimen: Urine, Suprapubic catheter Updated: 04/10/23 1605    UA w Reflex to Microscopic w Reflex to Culture [[id number]]  (Abnormal) Collected: 04/10/23 1542    Lab Status: Final result Specimen: Urine, Suprapubic catheter Updated: 04/10/23 1553     Color, UA Yellow     Clarity, UA Cloudy     Specific Gravity, UA 1 025     pH, UA 6 0     Leukocytes, UA Moderate     Nitrite, UA Positive     Protein, UA 30 (1+) mg/dl      Glucose,  (1/4%) mg/dl      Ketones, UA 40 (2+) mg/dl      Urobilinogen, UA 0 2 E U /dl      Bilirubin, UA Negative     Occult Blood, UA Moderate    Comprehensive metabolic panel [[id number]]  (Abnormal) Collected: 04/10/23 1423    Lab Status: Final result Specimen: Blood from Arm, Left Updated: 04/10/23 1444     Sodium 126 mmol/L      Potassium 4 6 mmol/L      Chloride 92 mmol/L      CO2 26 mmol/L      ANION GAP 8 mmol/L      BUN 10 mg/dL      Creatinine 0 92 mg/dL      Glucose 189 mg/dL Calcium 9 9 mg/dL      AST 13 U/L      ALT 14 U/L      Alkaline Phosphatase 106 U/L      Total Protein 7 9 g/dL      Albumin 3 9 g/dL      Total Bilirubin 1 20 mg/dL      eGFR 82 ml/min/1 73sq m     Narrative:      [place] guidelines for Chronic Kidney Disease (CKD):   •  Stage 1 with normal or high GFR (GFR > 90 mL/min/1 73 square meters)  •  Stage 2 Mild CKD (GFR = 60-89 mL/min/1 73 square meters)  •  Stage 3A Moderate CKD (GFR = 45-59 mL/min/1 73 square meters)  •  Stage 3B Moderate CKD (GFR = 30-44 mL/min/1 73 square meters)  •  Stage 4 Severe CKD (GFR = 15-29 mL/min/1 73 square meters)  •  Stage 5 End Stage CKD (GFR <15 mL/min/1 73 square meters)  Note: GFR calculation is accurate only with a steady state creatinine    CBC and differential [[id number]]  (Abnormal) Collected: 04/10/23 1423    Lab Status: Final result Specimen: Blood from Arm, Left Updated: 04/10/23 1431     WBC 16 83 Thousand/uL      RBC 5 32 Million/uL      Hemoglobin 15 7 g/dL      Hematocrit 45 3 %      MCV 85 fL      MCH 29 5 pg      MCHC 34 7 g/dL      RDW 13 2 %      MPV 8 0 fL      Platelets 694 Thousands/uL      nRBC 0 /100 WBCs      Neutrophils Relative 83 %      Immat GRANS % 1 %      Lymphocytes Relative 8 %      Monocytes Relative 7 %      Eosinophils Relative 0 %      Basophils Relative 1 %      Neutrophils Absolute 14 03 Thousands/µL      Immature Grans Absolute 0 14 Thousand/uL      Lymphocytes Absolute 1 41 Thousands/µL      Monocytes Absolute 1 12 Thousand/µL      Eosinophils Absolute 0 02 Thousand/µL      Basophils Absolute 0 11 Thousands/µL                  CTA head and neck with and without contrast   Final Result by Rocco Nova MD (04/10 1723)         1  No evidence of acute infarct, intracranial hemorrhage or mass  2   No evidence of residual basilar tip aneurysm  3   Focal moderate to severe stenosis in the P3 segment of the right posterior cerebral artery     4   No hemodynamically significant stenosis, dissection or occlusion of the carotid or vertebral arteries  Workstation performed: [id number]         XR chest 1 view portable   ED Interpretation by Courtney Lambert MD (04/10 1500)   I interpreted this XR as No acute cardiopulmonary process              Procedures  Procedures      ED Course  ED Course as of 04/10/23 1833   Mon Apr 10, 2023   1502 Suprpubic catheter replaced at bedside  Sterile procedure was used, with sterile gloves and 3 iodine swabs  20 Fr catheter placed with return of urine  Patient tolerated without any difficulty  1503 Sodium(!): 126  Patient is not usually hyponatremic   1602 Leukocytes, UA(!): Moderate   1602 Nitrite, UA(!): Positive   1606 Bacteria, UA(!): Innumerable   1606 WBC, UA(!): Innumerable                          Initial Sepsis Screening     Row Name 04/10/23 1558 04/10/23 1459             Is the patient's history suggestive of a new or worsening infection? Yes (Proceed)  -[place] --  -       Suspected source of infection urinary tract infection  -KH --  -KH       Indicate SIRS criteria Tachycardia > 90 bpm;Leukocytosis (WBC > 69001 IJL) OR Leukopenia (WBC <4000 IJL) OR Bandemia (WBC >10% bands)  -KH --       Are two or more of the above signs & symptoms of infection both present and new to the patient? Yes (Proceed)  -KH --       Assess for evidence of organ dysfunction: Are any of the below criteria present within 6 hours of suspected infection and SIRS criteria that are NOT considered to be chronic conditions? -- --             User Key  (r) = Recorded By, (t) = Taken By, (c) = Cosigned By    Initials Name Provider Type    Silviano Nick DO Physician                SBIRT 22yo+    Flowsheet Row Most Recent Value   SBIRT (23 yo +)    In order to provide better care to our patients, we are screening all of our patients for alcohol and drug use  Would it be okay to ask you these screening questions?  Unable to answer at this time Filed at: 04/10/2023 1415                Medical Decision Making  34-year-old male presenting to the ED today for reported confusion by nursing staff that visits him at home  Given his confusion and the suprapubic catheter that does not continuously drain I am concerned that there is possible urinary tract infection causing his confusion  We will evaluate with a CBC, CMP, troponin, twelve-lead EKG, procalcitonin, lactic acid, blood cultures  I changed out his suprapubic catheter and send a new urine specimen from the usual sterile suprapubic catheter  Given his history of recent instrumentation in the vessels of the head and neck we will also do a CTA head and neck to evaluate  Patient was found to have urinary tract infection  Patient was also found to be hyponatremic which is new for him  Was given a dose of Levaquin secondary to his Keflex allergy  Case was discussed with [facility]'s internal medicine and patient was admitted to their service in good condition  Amount and/or Complexity of Data Reviewed  Labs: ordered  Decision-making details documented in ED Course  Radiology: ordered and independent interpretation performed  Risk  Prescription drug management  Decision regarding hospitalization              Disposition  Final diagnoses:   Hyponatremia   Urinary tract infection   Multiple sclerosis ([facility] )     Time reflects when diagnosis was documented in both MDM as applicable and the Disposition within this note     Time User Action Codes Description Comment    4/10/2023  4:28 PM Nicklas Mura Add [E87 1] Hyponatremia     4/10/2023  4:28 PM Nicklas Mura Add [N39 0] Urinary tract infection     4/10/2023  4:28 PM Nicklas Mura Add [Z87 768] 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, limbs and ms sys     4/10/2023  4:28 PM Nicklas Mura Remove [Z87 768] 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, limbs and ms sys     4/10/2023  4:28 PM Gregory Lombardo Add [G35] [facility] ([facility] ED Disposition     ED Disposition   Admit    Condition   Stable    Date/Time   Mon Apr 10, 2023  6:09 PM    Comment   Case was discussed with Dr Kalee Oneil and the patient's admission status was agreed to be Admission Status: inpatient status to the service of Dr Kalee Oneil   Follow-up Information    None         Patient's Medications   Discharge Prescriptions    No medications on file     No discharge procedures on file  PDMP Review     None           ED Provider  Attending physically available and evaluated Lisa Joanna MAYFIELD managed the patient along with the ED Attending      Electronically Signed by         Todd Zavala MD  04/10/23 1562

## 2023-04-11 PROBLEM — E87.1 HYPONATREMIA: Status: ACTIVE | Noted: 2023-04-11

## 2023-04-11 LAB
ALBUMIN SERPL BCP-MCNC: 3.6 G/DL (ref 3.5–5)
ALP SERPL-CCNC: 80 U/L (ref 34–104)
ALT SERPL W P-5'-P-CCNC: 12 U/L (ref 7–52)
ANION GAP SERPL CALCULATED.3IONS-SCNC: 10 MMOL/L (ref 4–13)
ANION GAP SERPL CALCULATED.3IONS-SCNC: 7 MMOL/L (ref 4–13)
ANION GAP SERPL CALCULATED.3IONS-SCNC: 9 MMOL/L (ref 4–13)
ANION GAP SERPL CALCULATED.3IONS-SCNC: 9 MMOL/L (ref 4–13)
AST SERPL W P-5'-P-CCNC: 13 U/L (ref 13–39)
BASOPHILS # BLD AUTO: 0.07 THOUSANDS/ΜL (ref 0–0.1)
BASOPHILS NFR BLD AUTO: 1 % (ref 0–1)
BILIRUB SERPL-MCNC: 0.88 MG/DL (ref 0.2–1)
BUN SERPL-MCNC: 11 MG/DL (ref 5–25)
BUN SERPL-MCNC: 9 MG/DL (ref 5–25)
CALCIUM SERPL-MCNC: 8.8 MG/DL (ref 8.4–10.2)
CALCIUM SERPL-MCNC: 9.2 MG/DL (ref 8.4–10.2)
CHLORIDE SERPL-SCNC: 94 MMOL/L (ref 96–108)
CHLORIDE SERPL-SCNC: 94 MMOL/L (ref 96–108)
CHLORIDE SERPL-SCNC: 96 MMOL/L (ref 96–108)
CHLORIDE SERPL-SCNC: 97 MMOL/L (ref 96–108)
CO2 SERPL-SCNC: 22 MMOL/L (ref 21–32)
CO2 SERPL-SCNC: 22 MMOL/L (ref 21–32)
CO2 SERPL-SCNC: 24 MMOL/L (ref 21–32)
CO2 SERPL-SCNC: 25 MMOL/L (ref 21–32)
CREAT SERPL-MCNC: 0.82 MG/DL (ref 0.6–1.3)
CREAT SERPL-MCNC: 0.82 MG/DL (ref 0.6–1.3)
CREAT SERPL-MCNC: 0.89 MG/DL (ref 0.6–1.3)
CREAT SERPL-MCNC: 0.92 MG/DL (ref 0.6–1.3)
EOSINOPHIL # BLD AUTO: 0.06 THOUSAND/ΜL (ref 0–0.61)
EOSINOPHIL NFR BLD AUTO: 1 % (ref 0–6)
ERYTHROCYTE [DISTWIDTH] IN BLOOD BY AUTOMATED COUNT: 13 % (ref 11.6–15.1)
GFR SERPL CREATININE-BSD FRML MDRD: 82 ML/MIN/1.73SQ M
GFR SERPL CREATININE-BSD FRML MDRD: 84 ML/MIN/1.73SQ M
GFR SERPL CREATININE-BSD FRML MDRD: 87 ML/MIN/1.73SQ M
GFR SERPL CREATININE-BSD FRML MDRD: 87 ML/MIN/1.73SQ M
GLUCOSE SERPL-MCNC: 120 MG/DL (ref 65–140)
GLUCOSE SERPL-MCNC: 125 MG/DL (ref 65–140)
GLUCOSE SERPL-MCNC: 127 MG/DL (ref 65–140)
GLUCOSE SERPL-MCNC: 129 MG/DL (ref 65–140)
GLUCOSE SERPL-MCNC: 164 MG/DL (ref 65–140)
GLUCOSE SERPL-MCNC: 177 MG/DL (ref 65–140)
HCT VFR BLD AUTO: 41.6 % (ref 36.5–49.3)
HGB BLD-MCNC: 14.4 G/DL (ref 12–17)
IMM GRANULOCYTES # BLD AUTO: 0.04 THOUSAND/UL (ref 0–0.2)
IMM GRANULOCYTES NFR BLD AUTO: 0 % (ref 0–2)
LYMPHOCYTES # BLD AUTO: 1.54 THOUSANDS/ΜL (ref 0.6–4.47)
LYMPHOCYTES NFR BLD AUTO: 13 % (ref 14–44)
MCH RBC QN AUTO: 29.9 PG (ref 26.8–34.3)
MCHC RBC AUTO-ENTMCNC: 34.6 G/DL (ref 31.4–37.4)
MCV RBC AUTO: 86 FL (ref 82–98)
MONOCYTES # BLD AUTO: 0.85 THOUSAND/ΜL (ref 0.17–1.22)
MONOCYTES NFR BLD AUTO: 7 % (ref 4–12)
NEUTROPHILS # BLD AUTO: 8.97 THOUSANDS/ΜL (ref 1.85–7.62)
NEUTS SEG NFR BLD AUTO: 78 % (ref 43–75)
NRBC BLD AUTO-RTO: 0 /100 WBCS
OSMOLALITY UR: 801 MMOL/KG
PLATELET # BLD AUTO: 271 THOUSANDS/UL (ref 149–390)
PMV BLD AUTO: 8.1 FL (ref 8.9–12.7)
POTASSIUM SERPL-SCNC: 3.6 MMOL/L (ref 3.5–5.3)
POTASSIUM SERPL-SCNC: 3.8 MMOL/L (ref 3.5–5.3)
POTASSIUM SERPL-SCNC: 3.8 MMOL/L (ref 3.5–5.3)
POTASSIUM SERPL-SCNC: 3.9 MMOL/L (ref 3.5–5.3)
PROT SERPL-MCNC: 7.3 G/DL (ref 6.4–8.4)
RBC # BLD AUTO: 4.82 MILLION/UL (ref 3.88–5.62)
SODIUM SERPL-SCNC: 126 MMOL/L (ref 135–147)
SODIUM SERPL-SCNC: 127 MMOL/L (ref 135–147)
SODIUM SERPL-SCNC: 128 MMOL/L (ref 135–147)
SODIUM SERPL-SCNC: 128 MMOL/L (ref 135–147)
WBC # BLD AUTO: 11.53 THOUSAND/UL (ref 4.31–10.16)

## 2023-04-11 RX ORDER — ALPRAZOLAM 0.25 MG/1
0.25 TABLET ORAL DAILY PRN
Status: DISCONTINUED | OUTPATIENT
Start: 2023-04-11 | End: 2023-04-18 | Stop reason: HOSPADM

## 2023-04-11 RX ORDER — SODIUM CHLORIDE 9 MG/ML
75 INJECTION, SOLUTION INTRAVENOUS CONTINUOUS
Status: DISCONTINUED | OUTPATIENT
Start: 2023-04-11 | End: 2023-04-11

## 2023-04-11 RX ORDER — LEVOFLOXACIN 750 MG/1
750 TABLET ORAL EVERY 24 HOURS
Status: DISCONTINUED | OUTPATIENT
Start: 2023-04-11 | End: 2023-04-11

## 2023-04-11 RX ORDER — SODIUM CHLORIDE 9 MG/ML
100 INJECTION, SOLUTION INTRAVENOUS CONTINUOUS
Status: DISCONTINUED | OUTPATIENT
Start: 2023-04-11 | End: 2023-04-13

## 2023-04-11 RX ADMIN — SERTRALINE HYDROCHLORIDE 25 MG: 25 TABLET ORAL at 21:06

## 2023-04-11 RX ADMIN — CLOPIDOGREL BISULFATE 75 MG: 75 TABLET ORAL at 08:30

## 2023-04-11 RX ADMIN — GABAPENTIN 600 MG: 300 CAPSULE ORAL at 21:06

## 2023-04-11 RX ADMIN — SODIUM CHLORIDE 100 ML/HR: 0.9 INJECTION, SOLUTION INTRAVENOUS at 17:12

## 2023-04-11 RX ADMIN — GABAPENTIN 300 MG: 300 CAPSULE ORAL at 17:09

## 2023-04-11 RX ADMIN — AMLODIPINE BESYLATE 10 MG: 10 TABLET ORAL at 17:10

## 2023-04-11 RX ADMIN — GABAPENTIN 300 MG: 300 CAPSULE ORAL at 08:29

## 2023-04-11 RX ADMIN — SENNOSIDES AND DOCUSATE SODIUM 2 TABLET: 8.6; 5 TABLET ORAL at 21:06

## 2023-04-11 RX ADMIN — ATORVASTATIN CALCIUM 80 MG: 80 TABLET, FILM COATED ORAL at 17:09

## 2023-04-11 RX ADMIN — PROPRANOLOL HYDROCHLORIDE 60 MG: 60 CAPSULE, EXTENDED RELEASE ORAL at 08:30

## 2023-04-11 RX ADMIN — LATANOPROST 1 DROP: 50 SOLUTION OPHTHALMIC at 21:09

## 2023-04-11 RX ADMIN — SODIUM CHLORIDE 75 ML/HR: 9 INJECTION, SOLUTION INTRAVENOUS at 06:30

## 2023-04-11 RX ADMIN — ENOXAPARIN SODIUM 40 MG: 100 INJECTION SUBCUTANEOUS at 08:30

## 2023-04-11 RX ADMIN — INSULIN LISPRO 1 UNITS: 100 INJECTION, SOLUTION INTRAVENOUS; SUBCUTANEOUS at 12:08

## 2023-04-11 RX ADMIN — CEFTRIAXONE SODIUM 1000 MG: 10 INJECTION, POWDER, FOR SOLUTION INTRAVENOUS at 17:09

## 2023-04-11 NOTE — H&P
[address] [place] [address]  H&P  Name: Emily Taylor  MRN: [id number]  Unit/Bed#: E4 -01 I Date of Admission: 4/10/2023   Date of Service: 4/10/2023 I Hospital Day: 0      Assessment/Plan   * UTI (urinary tract infection)  Assessment & Plan  This is a 50-year-old male with history of longstanding multiple sclerosis, wheelchair-bound, hypertension, hyperlipidemia, neurogenic bladder with chronic suprapubic catheter in place senting with confusion  Patient gets his suprapubic catheter drained every day lives at home with his siblings  Denies any fever, chills, abdominal pain, nausea or vomiting  · Urinalysis reveals moderate leukocytes, positive nitrite, moderate blood, innumerable WBC and bacteria  · He has history of E  coli, allergic to cephalexin, continue with Levaquin  · Follow-up cultures    Acute metabolic encephalopathy  Assessment & Plan  · Metabolic encephalopathy likely secondary to UTI  · Continue supportive care    History of CVA (cerebrovascular accident)  Assessment & Plan  · History of CVA in October 2018  · ETA head and neck negative for any acute infarct, hemorrhage or mass effect, focal moderate to severe stenosis in P3 segment of right posterior cerebral artery  · Continue plavix and lipitor    Diabetes mellitus ([facility] [address] )  Assessment & Plan  Lab Results   Component Value Date    HGBA1C 8 7 05/10/2021       No results for input(s): POCGLU in the last 72 hours      · Hold oral hypoglycemics and Trulicity  · monitor Accu-Cheks, sliding scale for coverage    Neurogenic bladder  Assessment & Plan  · Chronic suprapubic catheter in place    Multiple sclerosis [facility])  Assessment & Plan  · Patient with history of longstanding multiple sclerosis  · Chronic lower extremity weakness, wheelchair-bound  · On gabapentin 300 mg twice daily and 600 mg at bedtime    Hypertension  Assessment & Plan  · Continue propranolol, amlodipine     Esophageal reflux  Assessment & Plan  · c/w PPI    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HCC)  Assessment & Plan  · 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  · CTA head and neck stable    Depression  Assessment & Plan  · C/w sertraline  · Alprazolam 0 25mg bid prn           VTE Prophylaxis: Enoxaparin (Lovenox)  / sequential compression device   Code Status: Full  POLST: There is no POLST form on file for this patient (pre-hospital)    Anticipated Length of Stay:  Patient will be admitted on an Inpatient basis with an anticipated length of stay of  Greater than 2 midnights  Justification for Hospital Stay: UTI    Total Time for Visit, including Counseling / Coordination of Care:Greater than 50% of this total time spent on direct patient counseling and coordination of care  Chief Complaint:   confusion    History of Present Illness:    Sia Lopez is a 67 y o  male who presents with confusion  Patient has history of longstanding multiple sclerosis, wheelchair-bound, hypertension, hyperlipidemia, neurogenic bladder with chronic suprapubic catheter in place senting with confusion  Patient gets his suprapubic catheter drained every day lives at home with his siblings  Denies any fever, chills, abdominal pain, nausea or vomiting  Review of Systems:    Review of Systems   HENT: Negative  Eyes: Negative  Respiratory: Negative  Cardiovascular: Negative  Gastrointestinal: Negative  Endocrine: Negative  Genitourinary: Negative  Musculoskeletal: Negative  Skin: Negative  Allergic/Immunologic: Negative  Neurological: Negative  Hematological: Negative  Psychiatric/Behavioral: Positive for confusion         Past Medical and Surgical History:     Past Medical History:   Diagnosis Date   • Acute laryngitis    • Acute nonsuppurative otitis media, unspecified laterality    • Arm weakness    • Arthritis    • 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[facility])    • Bladder infection    • Bronchitis    • Constipation    • Cough    • Diabetes mellitus (HCC)    • Dizziness    • Dysfunction of eustachian tube    • Erectile dysfunction of non-organic origin    • Fatigue    • Glaucoma    • Hiatal hernia    • Hypertension    • Imbalance    • Leg muscle spasm    • MS (multiple sclerosis) (HCC)    • Nephrolithiasis    • Neurogenic bladder    • No natural teeth    • Sinus pain    • Spinal stenosis    • Strain of thoracic region    • Stroke [facility])    • Suprapubic catheter ([address] )        Past Surgical History:   Procedure Laterality Date   • APPENDECTOMY     • BRAIN SURGERY      Coil placed in aneurysm   • CYSTOSCOPY     • CYSTOSCOPY     • CYSTOSCOPY  06/11/2018   • CYSTOSCOPY  01/15/2021   • EYE SURGERY      transscleral cyclophotocoagulation noncontact YAG laser   • MYRINGOTOMY      with ventilation tube insertion   • NY LITHOLAPAXY SMPL/SM <2 5 CM N/A 5/7/2019    Procedure: CYSTOSCOPY, holmium laser litholapaxy of bladder stones, EXCHANGE OF SP TUBE;  Surgeon: Augustina Pretty MD;  Location: [facility];  Service: Urology   • SUPRAPUBIC CATHETER INSERTION         Meds/Allergies:    Prior to Admission medications    Medication Sig Start Date End Date Taking?  Authorizing Provider   Acetaminophen (Mapap) 500 MG Take 500 mg by mouth every 6 (six) hours as needed for mild pain or moderate pain  Patient not taking: Reported on 7/25/2022    Historical Provider, MD   albuterol (2 5 mg/3 mL) 0 083 % nebulizer solution Take 2 5 mg by nebulization every 6 (six) hours as needed for wheezing or shortness of breath    Historical Provider, MD   albuterol (PROVENTIL HFA,VENTOLIN HFA) 90 mcg/act inhaler Inhale 2 puffs every 6 (six) hours as needed for wheezing    Historical Provider, MD   ALPRAZolam (XANAX) 0 25 mg tablet Take 0 25 mg by mouth 2 (two) times a day as needed for anxiety or sleep     Historical Provider, MD   amLODIPine-atorvastatin (CADUET) 10-80 MG per tablet Take 1 tablet by mouth daily    Historical Provider, MD   clopidogrel (PLAVIX) 75 mg tablet Take 1 tablet (75 mg total) by mouth daily 10/27/18   Deepti Dupont MD   Dulaglutide (Trulicity) 8 70 UW/9 1GF SOPN Inject 0 75 mg under the skin once a week    Historical Provider, MD   Ergocalciferol (VITAMIN D2 PO) Take 50,000 Units by mouth once a week    Historical Provider, MD   furosemide (LASIX) 40 mg tablet Take 40 mg by mouth daily    Historical Provider, MD   gabapentin (NEURONTIN) 300 mg capsule Take 1 capsule (300 mg total) by mouth 2 (two) times a day 6/3/21   Sharlene Vazquez DO   gabapentin (NEURONTIN) 300 mg capsule Take 2 capsules (600 mg total) by mouth daily at bedtime  Patient not taking: No sig reported 6/3/21   Sharlene Vazquez DO   latanoprost (XALATAN) 0 005 % ophthalmic solution Administer 1 drop to both eyes daily at bedtime    Historical Provider, MD   Lidocaine 4 % PTCH Apply 1 patch topically daily Leave in place for 12 hours  Patient not taking: No sig reported    Historical Provider, MD   loperamide (IMODIUM) 2 mg capsule  11/4/21   Historical Provider, MD   losartan (COZAAR) 50 mg tablet Take 50 mg by mouth daily    Patient not taking: No sig reported    Historical Provider, MD   meclizine (ANTIVERT) 12 5 MG tablet Take 12 5 mg by mouth 2 (two) times a day as needed for dizziness  Patient not taking: No sig reported    Historical Provider, MD   melatonin 3 mg Take 3 mg by mouth daily at bedtime as needed    Historical Provider, MD   meloxicam (Mobic) 15 mg tablet Take 1 tablet (15 mg total) by mouth daily Prn pain 5/7/22   Joe Mendoza MD   Menthol, Topical Analgesic, (Biofreeze) 4 % GEL Apply 1 application topically 4 (four) times a day as needed  Patient not taking: No sig reported    Historical Provider, MD   metFORMIN (GLUCOPHAGE) 1000 MG tablet Take 1,000 mg by mouth 2 (two) times a day  Patient not taking: Reported on 7/25/2022    Historical Provider, MD   ondansetron [facility]) 4 mg tablet  11/4/21   Historical Provider, MD oxyCODONE-acetaminophen (Percocet) 5-325 mg per tablet Take 1 tablet by mouth every 6 (six) hours as needed for severe pain Max Daily Amount: 4 tablets  Patient not taking: Reported on 2022   Armando Solorzano MD   pantoprazole (PROTONIX) 40 mg tablet TAKE 1 TABLET TWICE DAILY 30 MINUTES BEFORE BREAKFAST AND DINNER  3/13/18   Juan Pablo Lawson DO   polyethylene glycol (MIRALAX) 17 g packet Take 17 g by mouth daily as needed    Historical Provider, MD   potassium chloride (Klor-Con) 10 mEq tablet Take 10 mEq by mouth 2 (two) times a day    Historical Provider, MD   propranolol (INDERAL LA) 60 mg 24 hr capsule Take 60 mg by mouth daily    Historical Provider, MD   senna-docusate sodium (SENOKOT S) 8 6-50 mg per tablet Take 2 tablets by mouth daily at bedtime    Historical Provider, MD   sertraline (ZOLOFT) 25 mg tablet Take 25 mg by mouth daily at bedtime    Historical Provider, MD     I have reviewed home medications with patient personally  Allergies:    Allergies   Allergen Reactions   • Cephalexin Rash       Social History:     Social History     Substance and Sexual Activity   Alcohol Use Not Currently     Social History     Tobacco Use   Smoking Status Former   • Packs/day: 0 50   • Years: 31 00   • Pack years: 15 50   • Types: Cigarettes   • Start date:    • Quit date:    • Years since quittin 2   Smokeless Tobacco Never     Social History     Substance and Sexual Activity   Drug Use No       Family History:    Family History   Problem Relation Age of Onset   • Heart attack Mother    • Stroke Mother    • Heart attack Father    • Anuerysm Father         In Stomach        Physical Exam:     Vitals:   Blood Pressure: 126/56 (04/10/23 1900)  Pulse: 103 (04/10/23 1900)  Temperature: 98 4 °F (36 9 °C) (04/10/23 1410)  Temp Source: Oral (04/10/23 1410)  Respirations: 18 (04/10/23 1900)  SpO2: 96 % (04/10/23 1900)    Constitutional: Patient is oriented to person and place, disoriented to time  HEENT:  Normocephalic, atraumatic  Cardiovascular: Normal S1S2, RRR, No murmurs/rubs/gallops appreciated  Pulmonary:  Bilateral air entry, No rhonchi/rales/wheezing appreciated  Abdominal: Soft, Bowel sounds present, Non-tender, Non-distended  Extremities:  No cyanosis, clubbing or edema  Neurological: awake, alert, chronic lower extremity weakness  Suprapubic catheter in place  Additional Data:     Lab Results: I have personally reviewed pertinent reports  Results from last 7 days   Lab Units 04/10/23  1423   WBC Thousand/uL 16 83*   HEMOGLOBIN g/dL 15 7   HEMATOCRIT % 45 3   PLATELETS Thousands/uL 325   NEUTROS PCT % 83*   LYMPHS PCT % 8*   MONOS PCT % 7   EOS PCT % 0     Results from last 7 days   Lab Units 04/10/23  1423   POTASSIUM mmol/L 4 6   CHLORIDE mmol/L 92*   CO2 mmol/L 26   BUN mg/dL 10   CREATININE mg/dL 0 92   CALCIUM mg/dL 9 9   ALK PHOS U/L 106*   ALT U/L 14   AST U/L 13           Imaging: I have personally reviewed pertinent reports  CTA head and neck with and without contrast    Result Date: 4/10/2023  Narrative: CTA NECK AND BRAIN WITH AND WITHOUT CONTRAST INDICATION: Confusion  COMPARISON: 10/21/2018 and 6/2/2021  TECHNIQUE:  Routine CT imaging of the Brain without contrast   Post contrast imaging was performed after administration of iodinated contrast through the neck and brain  Post contrast axial 0 625 mm images timed to opacify the arterial system  3D rendering was performed on an independent workstation  MIP reconstructions performed  Coronal reconstructions were performed of the noncontrast portion of the brain  Radiation dose length product (DLP) for this visit:  2254 mGy-cm   This examination, like all CT scans performed in the [facility], was performed utilizing techniques to minimize radiation dose exposure, including the use of iterative reconstruction and automated exposure control     IV Contrast:  100 mL of iohexol (OMNIPAQUE)  IMAGE QUALITY:   Diagnostic FINDINGS: NONCONTRAST BRAIN PARENCHYMA:  The hardening and streak artifact from basilar tip aneurysm coiling partially obscures the field of view  Within this limitation, no evidence of acute intracranial hemorrhage  Periventricular and subcortical hypoattenuating foci consistent with mild microangiopathic disease    VENTRICLES AND EXTRA-AXIAL SPACES:  No hydrocephalus or extra-axial collection  VISUALIZED ORBITS: Intact globes and orbits  PARANASAL SINUSES: Mucosal thickening in the left maxillary sinus  CERVICAL VASCULATURE AORTIC ARCH AND GREAT VESSELS:  Three-vessel configuration aortic arch  No stenosis in the subclavian arteries  RIGHT VERTEBRAL ARTERY CERVICAL SEGMENT:  Normal origin  The vessel is normal in caliber throughout the neck  LEFT VERTEBRAL ARTERY CERVICAL SEGMENT:  Normal origin  The vessel is normal in caliber throughout the neck  RIGHT EXTRACRANIAL CAROTID SEGMENT:  Normal caliber common carotid artery  Minimal calcification at the bifurcation and internal carotid artery origin without hemodynamically significant stenosis  LEFT EXTRACRANIAL CAROTID SEGMENT:  Normal caliber common carotid artery  Minimal calcification at the bifurcation and internal carotid artery origin without hemodynamically significant stenosis  NASCET criteria was used to determine the degree of internal carotid artery diameter stenosis  INTRACRANIAL VASCULATURE INTERNAL CAROTID ARTERIES:  Calcified plaque the carotid siphons without hemodynamically significant stenosis  ANTERIOR CIRCULATION:  Symmetric A1 segments and anterior cerebral arteries with normal enhancement  Normal anterior communicating artery  MIDDLE CEREBRAL ARTERY CIRCULATION:  M1 segment and middle cerebral artery branches demonstrate normal enhancement bilaterally  DISTAL VERTEBRAL ARTERIES:  Normal distal vertebral arteries  Posterior inferior cerebellar arteries are patent   BASILAR ARTERY:  Being demonstrated hardening artifact from basilar tip aneurysm stent coiling partially obscure the view  Within this limitation, no evidence of residual aneurysm  No basilar artery stenosis  Patent superior cerebellar arteries  POSTERIOR CEREBRAL ARTERIES: Focal moderate to severe (60-80%) stenosis in the right P3 segment  No significant stenosis in the left PCA  VENOUS STRUCTURES:  Patent dural venous sinuses  NON VASCULAR ANATOMY BONY STRUCTURES:  Posterior disc bulge and disc osteophytes from C3-4 to C6-7 resulting in mild to moderate central canal stenosis and neural foraminal narrowing No lytic or blastic lesion  SOFT TISSUES OF THE NECK:  No mass or lymphadenopathy  THORACIC INLET:  Clear lung apices  Impression: 1  No evidence of acute infarct, intracranial hemorrhage or mass  2   No evidence of residual basilar tip aneurysm  3   Focal moderate to severe stenosis in the P3 segment of the right posterior cerebral artery  4   No hemodynamically significant stenosis, dissection or occlusion of the carotid or vertebral arteries  Workstation performed: [id number]         Allscripts / Epic Records Reviewed: Yes     ** Please Note: This note has been constructed using a voice recognition system   **

## 2023-04-11 NOTE — ASSESSMENT & PLAN NOTE
· History of CVA in October 2018  · CTA head and neck negative for any acute infarct, hemorrhage or mass effect, focal moderate to severe stenosis in P3 segment of right posterior cerebral artery  · Continue plavix and lipitor [employer] [facility] [place] 1

## 2023-04-11 NOTE — PLAN OF CARE
Problem: Potential for Falls  Goal: Patient will remain free of falls  Description: INTERVENTIONS:  - Educate patient/family on patient safety including physical limitations  - Instruct patient to call for assistance with activity   - Consult OT/PT to assist with strengthening/mobility   - Keep Call bell within reach  - Keep bed low and locked with side rails adjusted as appropriate  - Keep care items and personal belongings within reach  - Initiate and maintain comfort rounds  - Make Fall Risk Sign visible to staff  - Offer Toileting every 2 Hours, in advance of need  - Initiate/Maintain bed alarm  - Obtain necessary fall risk management equipment:   - Apply yellow socks and bracelet for high fall risk patients  - Consider moving patient to room near nurses station  Outcome: Progressing     Problem: MOBILITY - ADULT  Goal: Maintain or return to baseline ADL function  Description: INTERVENTIONS:  -  Assess patient's ability to carry out ADLs; assess patient's baseline for ADL function and identify physical deficits which impact ability to perform ADLs (bathing, care of mouth/teeth, toileting, grooming, dressing, etc )  - Assess/evaluate cause of self-care deficits   - Assess range of motion  - Assess patient's mobility; develop plan if impaired  - Assess patient's need for assistive devices and provide as appropriate  - Encourage maximum independence but intervene and supervise when necessary  - Involve family in performance of ADLs  - Assess for home care needs following discharge   - Consider OT consult to assist with ADL evaluation and planning for discharge  - Provide patient education as appropriate  Outcome: Progressing  Goal: Maintains/Returns to pre admission functional level  Description: INTERVENTIONS:  - Perform BMAT or MOVE assessment daily    - Set and communicate daily mobility goal to care team and patient/family/caregiver     - Collaborate with rehabilitation services on mobility goals if consulted  - Perform Range of Motion 3 times a day  - Reposition patient every 2 hours    - Dangle patient 3 times a day  - Stand patient 3 times a day  - Ambulate patient 3 times a day  - Out of bed to chair 3 times a day   - Out of bed for meals 3 times a day  - Out of bed for toileting  - Record patient progress and toleration of activity level   Outcome: Progressing

## 2023-04-11 NOTE — QUICK NOTE
Overnight: 04/11/23:    Signout from Dr Pito Sneed in early evening regarding hyponatremia  Repeat Na 126  Appeared clinically dry on exam, serum osm 278, Nayana 21, Uosm pending  500cc fluid challenge over 4 hours, repeat lab Na remains 126  Seen again this AM still appears dry, started on NS 75cc/6h, repeat BMP scheduled for this afternoon  Lasix d/c'd

## 2023-04-11 NOTE — ASSESSMENT & PLAN NOTE
· Metabolic encephalopathy likely secondary to UTI vs xanax vs dehydration vs hyponatremia  · Continue plan above  · Mental status appears intact today

## 2023-04-11 NOTE — ASSESSMENT & PLAN NOTE
Lab Results   Component Value Date    HGBA1C 8 7 05/10/2021       No results for input(s): POCGLU in the last 72 hours      · Hold oral hypoglycemics and Trulicity  · monitor Accu-Cheks, sliding scale for coverage

## 2023-04-11 NOTE — PROGRESS NOTES
[address]  Progress Note  Name: Joy Wheeler  MRN: [id number]  Unit/Bed#: E4 -01 I Date of Admission: 4/10/2023   Date of Service: 4/11/2023 I Hospital Day: 1    Assessment/Plan   * Confusion  Assessment & Plan  This is a 74-year-old male with history of longstanding multiple sclerosis, wheelchair-bound, hypertension, hyperlipidemia, neurogenic bladder with chronic suprapubic catheter in place senting with confusion  Patient gets his suprapubic catheter drained every day lives at home with his siblings  Denies any fever, chills, abdominal pain  Has had nausea and poor oral intake at home       · Initially thought to have UTI given UA with: moderate leukocytes, positive nitrite, moderate blood, innumerable WBC and bacteria  · He has history of E  coli, allergic to cephalexin, was started on Levaquin  · Urine culture pending  · Blood culture pending  · Consider other etiology being hyponatremia in the setting of dehydration and xanax use  · However with suspicion this is a true UTI given confusion, tube discomfort, leukocytosis, and elevated procal- WBC appearing to improve on IV antibiotics   · Will continue with IV antibiotics but switch to ceftriaxone after speaking with ID pharmacy    Hyponatremia  Assessment & Plan  Results from last 7 days   Lab Units 04/11/23  0808 04/11/23  0204 04/10/23  2041 04/10/23  1423   SODIUM mmol/L 128* 126*  127* 126* 126*   · Presented with low na of 126  · Suspecting secondary to dehydration   · Started on IV fluids which na appears to be improving  · Lasix currently on hold - urine sodium studies skewed since pt was on lasix prior to admission  · If Na worsens, consider nephrology consult  · Will increase fluid rate and recheck na this afternoon   · Continue to follow    Acute metabolic encephalopathy  Assessment & Plan  · Metabolic encephalopathy likely secondary to UTI vs xanax vs dehydration vs hyponatremia  · Continue plan above  · Mental status appears intact today     History of CVA (cerebrovascular accident)  Assessment & Plan  · History of CVA in October 2018  · CTA head and neck negative for any acute infarct, hemorrhage or mass effect, focal moderate to severe stenosis in P3 segment of right posterior cerebral artery  · Continue plavix and lipitor    Diabetes mellitus [facility])  Assessment & Plan  Lab Results   Component Value Date    HGBA1C 8 7 05/10/2021     Recent Labs     04/10/23  2045 04/11/23  0744   POCGLU 145* 120   · Hold oral hypoglycemics and Trulicity  · Monitor Accu-Cheks, sliding scale for coverage    Neurogenic bladder  Assessment & Plan  · Chronic suprapubic catheter in place    Multiple sclerosis ([facility] [address] )  Assessment & Plan  · Patient with history of longstanding multiple sclerosis  · Chronic lower extremity weakness, wheelchair-bound  · On gabapentin 300 mg twice daily and 600 mg at bedtime    Hypertension  Assessment & Plan  · Home regimen propranolol 60 mg daily, amlodipine 10 mg daily    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HCC)  Assessment & Plan  · 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  · CTA head and neck stable    Depression  Assessment & Plan  · Continue home sertraline  · Alprazolam 0 25 mg bid prn  · Will decrease alprazolam to 0 25 mg daily prn          VTE Pharmacologic Prophylaxis:   Pharmacologic: Enoxaparin (Lovenox)  Mechanical VTE Prophylaxis in Place: Yes    Discharge Plan: With need for continued stay for hyponatremia    Discussions with Specialists or Other Care Team Provider: Nursing    Education and Discussions with Family / Patient: patient, brother via telephone    Time Spent for Care: 45 minutes  More than 50% of total time spent on counseling and coordination of care as described above  Current Length of Stay: 1 day(s)  Current Patient Status: Inpatient   Code Status: Level 1 - Full Code    Subjective:   Resting in bed   Has suprapubic cath in place  Reports he is wheelchair bound and lives at home with his brother and sister  Reports poor oral intake of food and drink at home bc of lack of appetite  Spoke with brother via telephone  Objective:     Vitals:   Temp (24hrs), Av 9 °F (36 6 °C), Min:97 2 °F (36 2 °C), Max:98 4 °F (36 9 °C)    Temp:  [97 2 °F (36 2 °C)-98 4 °F (36 9 °C)] 98 2 °F (36 8 °C)  HR:  [] 96  Resp:  [18-21] 18  BP: (126-172)/(56-80) 141/62  SpO2:  [93 %-98 %] 98 %  There is no height or weight on file to calculate BMI  Input and Output Summary (last 24 hours): Intake/Output Summary (Last 24 hours) at 2023 1301  Last data filed at 2023 1211  Gross per 24 hour   Intake 150 ml   Output 1120 ml   Net -970 ml       Physical Exam:     Physical Exam  Vitals and nursing note reviewed  Constitutional:       General: He is not in acute distress  Appearance: Normal appearance  He is normal weight  He is not ill-appearing, toxic-appearing or diaphoretic  HENT:      Head: Normocephalic and atraumatic  Eyes:      General: No scleral icterus  Cardiovascular:      Rate and Rhythm: Normal rate and regular rhythm  Pulmonary:      Effort: Pulmonary effort is normal  No respiratory distress  Breath sounds: No stridor  No wheezing or rhonchi  Abdominal:      General: Bowel sounds are normal  There is no distension  Palpations: Abdomen is soft  There is no mass  Tenderness: There is no abdominal tenderness  Hernia: No hernia is present  Musculoskeletal:         General: No swelling  Cervical back: Neck supple  Skin:     General: Skin is warm and dry  Neurological:      Mental Status: He is alert and oriented to person, place, and time  Mental status is at baseline     Psychiatric:         Mood and Affect: Mood normal          Behavior: Behavior normal          Additional Data:     Labs:    Results from last 7 days   Lab Units 23  0204   WBC Thousand/uL 11 53*   HEMOGLOBIN g/dL 14 4   HEMATOCRIT % 41 6   PLATELETS Thousands/uL 271   NEUTROS PCT % 78*   LYMPHS PCT % 13*   MONOS PCT % 7   EOS PCT % 1     Results from last 7 days   Lab Units 04/11/23  0808 04/11/23  0204   POTASSIUM mmol/L 3 6 3 8  3 9   CHLORIDE mmol/L 96 94*  94*   CO2 mmol/L 22 25  24   BUN mg/dL 11 11  11   CREATININE mg/dL 0 82 0 89  0 92   CALCIUM mg/dL 9 2 9 2  9 2   ALK PHOS U/L  --  80   ALT U/L  --  12   AST U/L  --  13           * I Have Reviewed All Lab Data Listed Above  * Additional Pertinent Lab Tests Reviewed: [address] Admission Reviewed    Imaging:    Imaging Reports Reviewed Today Include:   Imaging Personally Reviewed by Myself Includes:      Recent Cultures (last 7 days):     Results from last 7 days   Lab Units 04/10/23  1723 04/10/23  1712   BLOOD CULTURE  Received in Microbiology Lab  Culture in Progress  Received in Microbiology Lab  Culture in Progress         Last 24 Hours Medication List:   Current Facility-Administered Medications   Medication Dose Route Frequency Provider Last Rate   • acetaminophen  650 mg Oral Q6H PRN Priscila Shetty MD     • albuterol  2 5 mg Nebulization Q6H PRN Priscila Shetty MD     • ALPRAZolam  0 25 mg Oral Daily PRN Nataliya Uribe PA-C     • amLODIPine  10 mg Oral Daily With Vince Florentino MD      And   • atorvastatin  80 mg Oral Daily With Vince Florentino MD     • cefTRIAXone  1,000 mg Intravenous Q24H Nataliya Uribe PA-C     • clopidogrel  75 mg Oral Daily Priscila Shetty MD     • enoxaparin  40 mg Subcutaneous Daily Priscila Shetty MD     • gabapentin  300 mg Oral BID Priscila Shetty MD     • gabapentin  600 mg Oral HS Priscila Shetty MD     • insulin lispro  1-5 Units Subcutaneous TID AC Priscila Shetty MD     • latanoprost  1 drop Both Eyes HS Priscila Shetty MD     • melatonin  3 mg Oral HS PRN Priscila Shetty MD     • propranolol  60 mg Oral Daily Priscila Shetty MD     • senna-docusate sodium  2 tablet Oral HS Priscila Shetty MD     • sertraline  25 mg Oral HS Jojo Maxwell MD     • sodium chloride  100 mL/hr Intravenous Continuous Geo Bai PA-C          Today, Patient Was Seen By: Geo Bai PA-C    ** Please Note: This note has been constructed using a voice recognition system   **

## 2023-04-11 NOTE — PROGRESS NOTES
The levofloxacin has / have been converted to Oral per [facility] IV-to-PO Auto-Conversion Protocol for Adults as approved by the Pharmacy and Therapeutics Committee  The patient met all eligible criteria:  3 Age = 25years old   2) Received at least one dose of the IV form   3) Receiving at least one other scheduled oral/enteral medication   4) Tolerating an oral/enteral diet   and did not have any exclusions:   1) Critical care patient   2) Active GI bleed (IF assessing H2RAs or PPIs)   3) Continuous tube feeding (IF assessing cipro, doxycycline, levofloxacin, minocycline, rifampin, or voriconazole)   4) Receiving PO vancomycin (IF assessing metronidazole)   5) Persistent nausea and/or vomiting   6) Ileus or gastrointestinal obstruction   7) Mar/nasogastric tube set for continuous suction   8) Specific order not to automatically convert to PO (in the order's comments or if discussed in the most recent Infectious Disease or primary team's progress notes)

## 2023-04-11 NOTE — ASSESSMENT & PLAN NOTE
Lab Results   Component Value Date    HGBA1C 8 7 05/10/2021     Recent Labs     04/10/23  2045 04/11/23  0744   POCGLU 145* 120   · Hold oral hypoglycemics and Trulicity  · Monitor Accu-Cheks, sliding scale for coverage

## 2023-04-11 NOTE — UTILIZATION REVIEW
Notification of Unplanned, Urgent, or   Emergency Inpatient Admission   [address] [address] [address] [facility]  Tax ID: [id number]  NPI: [id number]  [facility]  [address]  Admission Level of Care: Inpatient  Place of Service Code: 21     Attending Physician Information  Attending Name and NPI#: Caio Friedman [[id number]  Phone: [phone]     Admission Information  Inpatient Admission Date/Time: 4/10/23  6:13 PM  Discharge Date/Time: No discharge date for patient encounter  Admitting Diagnosis Code/Description:  Multiple sclerosis ([facility] [address] ) [G35]  Hyponatremia [E87 1]  Urinary tract infection [N39 0]     Utilization Review Contact  Jannette Almanzar, Utilization   Phone: [phone]  Fax: [phone]  Email: [email or web address]  Contact for approvals/pending authorizations, clinical reviews, and discharge  Physician Advisory Services Contact  Medical Necessity Denial & Cesu-lg-Llvf Discussion  Phone: [phone]  Fax: [phone]  Email: [email or web address]

## 2023-04-11 NOTE — ASSESSMENT & PLAN NOTE
Results from last 7 days   Lab Units 04/11/23  0808 04/11/23  0204 04/10/23  2041 04/10/23  1423   SODIUM mmol/L 128* 126*  127* 126* 126*   · Presented with low na of 126  · Suspecting secondary to dehydration   · Started on IV fluids which na appears to be improving  · Lasix currently on hold - urine sodium studies skewed since pt was on lasix prior to admission  · If Na worsens, consider nephrology consult  · Will increase fluid rate and recheck na this afternoon   · Continue to follow

## 2023-04-11 NOTE — ASSESSMENT & PLAN NOTE
· Continue home sertraline  · Alprazolam 0 25 mg bid prn  · Will decrease alprazolam to 0 25 mg daily prn

## 2023-04-11 NOTE — UTILIZATION REVIEW
Initial Clinical Review    Admission: Date/Time/Statement:   Admission Orders (From admission, onward)     Ordered        04/10/23 1813  INPATIENT ADMISSION  Once                      Orders Placed This Encounter   Procedures   • INPATIENT ADMISSION     Standing Status:   Standing     Number of Occurrences:   1     Order Specific Question:   Level of Care     Answer:   Med Surg [16]     Order Specific Question:   Estimated length of stay     Answer:   More than 2 Midnights     Order Specific Question:   Certification     Answer:   I certify that inpatient services are medically necessary for this patient for a duration of greater than two midnights  See H&P and MD Progress Notes for additional information about the patient's course of treatment  ED Arrival Information     Expected   -    Arrival   4/10/2023 14:04    Acuity   Emergent            Means of arrival   Ambulance    Escorted by   [facility] ([address])    Service   Hospitalist    Admission type   Emergency            Arrival complaint   altered mental status           Chief Complaint   Patient presents with   • Altered Mental Status     Pt brought in via EMS from home  Per EMS pt has a nurse at home that reported pt has altered mental status and confusion  Pt is oriented to person but not time  Pt reports feeling drowsy and out of it  Denies chest pain/sob  Initial Presentation: 67 y o  male presents to the ED via EMS from home with c/o confusion and no other complaints  PMH: MS, wheelchair bound, HTN, HLD, NIDDM, GERD, depression, neurogenic bladder w/ chronic suprapubic cath, h/o CVA  In the ED labs - leukocytosis, low Na 126, elevated alk phos, T bili, elevated procal, abnormal UA  Imaging - Focal moderate to severe stenosis in the P3 segment of the right posterior cerebral artery, otherwise no deficits  Treated with IV Levaquin, IV Toradol  On exam oriented to person, place, lungs clear, no abd pain, chronic BLE weakness    He is admitted to INPATIENT status with UTI - IV antibiotics, follow urine culture  Acute metabolic encephalopathy - likely d/t UTI  MS - home gabapentin  Date: 4/11  Day 2:   WBCs down to 11, sodium still low but increased to 128  serum osm 278, Nayana 21, Urine osm pending  500cc fluid challenge over 4 hours, repeat lab Na 128  Seen again this AM still appears dry, started on NS 75cc/6h, repeat BMP scheduled for this afternoon  Lasix d/c'd   pt found to have E coli by h/x - started on Levaquin and switching to Ceftriaxone today  Improving leukocytosis  On exam mental status intact  ED Triage Vitals   Temperature Pulse Respirations Blood Pressure SpO2   04/10/23 1410 04/10/23 1410 04/10/23 1410 04/10/23 1410 04/10/23 1410   98 4 °F (36 9 °C) 90 18 136/63 96 %      Temp Source Heart Rate Source Patient Position - Orthostatic VS BP Location FiO2 (%)   04/10/23 1410 04/10/23 1410 04/10/23 1410 04/10/23 1410 --   Oral Monitor Lying Right arm       Pain Score       04/10/23 1752       10 - Worst Possible Pain          Wt Readings from Last 1 Encounters:   02/27/23 81 6 kg (180 lb)     Additional Vital Signs:   04/11/23 0700 -- -- -- 141/62 -- -- -- Lying   04/11/23 0317 -- 96 18 172/80 Abnormal  120 98 % None (Room air) Lying   04/10/23 2352 98 2 °F (36 8 °C) 86 18 140/71 82 96 % None (Room air) Sitting   04/10/23 2038 97 2 °F (36 2 °C) Abnormal  89 18 142/64 92 95 % None (Room air) Lying   04/10/23 1900 -- 103 18 126/56 79 96 % None (Room air) Lying   04/10/23 1800 -- 92 18 152/70 101 97 % None (Room air) --   04/10/23 1758 -- 94 18 160/65 94 95 % None (Room air) Lying   04/10/23 1700 -- 92 21 145/65 94 93 % -- --   04/10/23 1542 -- 93 18 140/66 95 97 % None (Room air) Lying     Pertinent Labs/Diagnostic Test Results:   CTA head and neck with and without contrast   Final Result by Lola Roberson MD (04/10 1723)         1  No evidence of acute infarct, intracranial hemorrhage or mass     2   No evidence of residual basilar tip aneurysm  3   Focal moderate to severe stenosis in the P3 segment of the right posterior cerebral artery  4   No hemodynamically significant stenosis, dissection or occlusion of the carotid or vertebral arteries  XR chest 1 view portable   No acute cardiopulmonary disease           Results from last 7 days   Lab Units 04/11/23  0204 04/10/23  2041 04/10/23  1423   WBC Thousand/uL 11 53*  --  16 83*   HEMOGLOBIN g/dL 14 4  --  15 7   HEMATOCRIT % 41 6  --  45 3   PLATELETS Thousands/uL 271 290 325   NEUTROS ABS Thousands/µL 8 97*  --  14 03*         Results from last 7 days   Lab Units 04/11/23  1404 04/11/23  0808 04/11/23  0204 04/10/23  2041 04/10/23  1423   SODIUM mmol/L 128* 128* 126*  127* 126* 126*   POTASSIUM mmol/L 3 8 3 6 3 8  3 9 4 1 4 6   CHLORIDE mmol/L 97 96 94*  94* 92* 92*   CO2 mmol/L 22 22 25  24 25 26   ANION GAP mmol/L 9 10 7  9 9 8   BUN mg/dL 9 11 11  11 10 10   CREATININE mg/dL 0 82 0 82 0 89  0 92 0 89 0 92   EGFR ml/min/1 73sq m 87 87 84  82 84 82   CALCIUM mg/dL 8 8 9 2 9 2  9 2 9 7 9 9     Results from last 7 days   Lab Units 04/11/23  0204 04/10/23  1423   AST U/L 13 13   ALT U/L 12 14   ALK PHOS U/L 80 106*   TOTAL PROTEIN g/dL 7 3 7 9   ALBUMIN g/dL 3 6 3 9   TOTAL BILIRUBIN mg/dL 0 88 1 20*     Results from last 7 days   Lab Units 04/11/23  1205 04/11/23  0744 04/10/23  2045   POC GLUCOSE mg/dl 177* 120 145*     Results from last 7 days   Lab Units 04/11/23  1404 04/11/23  0808 04/11/23  0204 04/10/23  2041 04/10/23  1423   GLUCOSE RANDOM mg/dL 164* 125 120  120 156* 189*     Results from last 7 days   Lab Units 04/10/23  2041   OSMOLALITY, SERUM mmol/*     Results from last 7 days   Lab Units 04/10/23  2041   TSH 3RD GENERATON uIU/mL 2 863     Results from last 7 days   Lab Units 04/10/23  1712   PROCALCITONIN ng/ml 0 47*     Results from last 7 days   Lab Units 04/10/23  1712   LACTIC ACID mmol/L 1 3     Results from last 7 days   Lab Units 04/10/23  2227 04/10/23  2041   OSMOLALITY, SERUM mmol/KG  --  278*   OSMO UR mmol/  --      Results from last 7 days   Lab Units 04/10/23  2023 04/10/23  1542   CLARITY UA   --  Cloudy   COLOR UA   --  Yellow   SPEC GRAV UA   --  1 025   PH UA   --  6 0   GLUCOSE UA mg/dl  --  250 (1/4%)*   KETONES UA mg/dl  --  40 (2+)*   BLOOD UA   --  Moderate*   PROTEIN UA mg/dl  --  30 (1+)*   NITRITE UA   --  Positive*   BILIRUBIN UA   --  Negative   UROBILINOGEN UA E U /dl  --  0 2   LEUKOCYTES UA   --  Moderate*   WBC UA /hpf  --  Innumerable*   RBC UA /hpf  --  10-20*   BACTERIA UA /hpf  --  Innumerable*   EPITHELIAL CELLS WET PREP /hpf  --  None Seen   SODIUM UR  21  --      Results from last 7 days   Lab Units 04/10/23  1723 04/10/23  1712   BLOOD CULTURE  Received in Microbiology Lab  Culture in Progress  Received in Microbiology Lab  Culture in Progress                 ED Treatment:   Medication Administration from 04/10/2023 1404 to 04/10/2023 1953       Date/Time Order Dose Route Action     04/10/2023 1727 EDT levofloxacin (LEVAQUIN) IVPB (premix in dextrose) 750 mg 150 mL 750 mg Intravenous New Bag     04/10/2023 1638 EDT iohexol (OMNIPAQUE) 350 MG/ML injection (SINGLE-DOSE) 100 mL 100 mL Intravenous Given     04/10/2023 1752 EDT ketorolac (TORADOL) injection 15 mg 15 mg Intravenous Given        Past Medical History:   Diagnosis Date   • Acute laryngitis    • Acute nonsuppurative otitis media, unspecified laterality    • Arm weakness    • Arthritis    • 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HCC)    • Bladder infection    • Bronchitis    • Constipation    • Cough    • Diabetes mellitus (HCC)    • Dizziness    • Dysfunction of eustachian tube    • Erectile dysfunction of non-organic origin    • Fatigue    • Glaucoma    • Hiatal hernia    • Hypertension    • Imbalance    • Leg muscle spasm    • MS (multiple sclerosis) (HCC)    • Nephrolithiasis    • Neurogenic bladder    • No natural teeth    • Sinus pain    • Spinal stenosis    • Strain of thoracic region    • Stroke [facility])    • Suprapubic catheter ([facility] [address] )      Present on Admission:  • Dyslipidemia  • Diabetes mellitus ([facility] [address] )  • Neurogenic bladder  • Depression  • Hypertension  • Multiple sclerosis ([facility] [address] )  • 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HCC)  • Confusion  • Acute metabolic encephalopathy  • Hyponatremia      Admitting Diagnosis: Multiple sclerosis (HCC) [G35]  Hyponatremia [E87 1]  Urinary tract infection [N39 0]  Age/Sex: 67 y o  male  Admission Orders:  Scheduled Medications:  amLODIPine, 10 mg, Oral, Daily With Dinner   And  atorvastatin, 80 mg, Oral, Daily With Dinner  cefTRIAXone, 1,000 mg, Intravenous, Q24H  clopidogrel, 75 mg, Oral, Daily  enoxaparin, 40 mg, Subcutaneous, Daily  gabapentin, 300 mg, Oral, BID  gabapentin, 600 mg, Oral, HS  insulin lispro, 1-5 Units, Subcutaneous, TID AC  latanoprost, 1 drop, Both Eyes, HS  propranolol, 60 mg, Oral, Daily  senna-docusate sodium, 2 tablet, Oral, HS  sertraline, 25 mg, Oral, HS      Continuous IV Infusions:  sodium chloride, 75 mL/hr, Intravenous, Continuous - d/c 4/11      PRN Meds:  acetaminophen, 650 mg, Oral, Q6H PRN  albuterol, 2 5 mg, Nebulization, Q6H PRN  ALPRAZolam, 0 25 mg, Oral, BID PRN  melatonin, 3 mg, Oral, HS PRN    Urine culture  POC GLUCOSE AC/HS WITH SSI COVERAGE       Network Utilization Review Department  ATTENTION: Please call with any questions or concerns to [phone] and carefully listen to the prompts so that you are directed to the right person  All voicemails are confidential   Delmus Goes all requests for admission clinical reviews, approved or denied determinations and any other requests to dedicated fax number below belonging to the campus where the patient is receiving treatment   List of dedicated fax numbers for the Facilities:  FACILITY NAME UR FAX NUMBER   ADMISSION DENIALS (Administrative/Medical Necessity) [address] [facility] (Maternity/NICU/Pediatrics) [phone]   [facility] [address] [facility] [phone]   [facility] [address] [phone]   [facility] [place] [address] [address] [facility]  [phone]   [address] [facility] [address] [facility] [phone]

## 2023-04-11 NOTE — ASSESSMENT & PLAN NOTE
This is a 65-year-old male with history of longstanding multiple sclerosis, wheelchair-bound, hypertension, hyperlipidemia, neurogenic bladder with chronic suprapubic catheter in place senting with confusion  Patient gets his suprapubic catheter drained every day lives at home with his siblings  Denies any fever, chills, abdominal pain  Has had nausea and poor oral intake at home       · Initially thought to have UTI given UA with: moderate leukocytes, positive nitrite, moderate blood, innumerable WBC and bacteria  · He has history of E  coli, allergic to cephalexin, was started on Levaquin  · Urine culture pending  · Blood culture pending  · Consider other etiology being hyponatremia in the setting of dehydration and xanax use  · However with suspicion this is a true UTI given confusion, tube discomfort, leukocytosis, and elevated procal- WBC appearing to improve on IV antibiotics   · Will continue with IV antibiotics but switch to ceftriaxone after speaking with ID pharmacy

## 2023-04-11 NOTE — PLAN OF CARE
Problem: Potential for Falls  Goal: Patient will remain free of falls  Description: INTERVENTIONS:  - Educate patient/family on patient safety including physical limitations  - Instruct patient to call for assistance with activity   - Consult OT/PT to assist with strengthening/mobility   - Keep Call bell within reach  - Keep bed low and locked with side rails adjusted as appropriate  - Keep care items and personal belongings within reach  - Initiate and maintain comfort rounds  - Make Fall Risk Sign visible to staff  - Offer Toileting every 2 Hours, in advance of need  - Initiate/Maintain bed alarm  - Obtain necessary fall risk management equipment: alarm  - Apply yellow socks and bracelet for high fall risk patients  - Consider moving patient to room near nurses station  Outcome: Progressing     Problem: MOBILITY - ADULT  Goal: Maintain or return to baseline ADL function  Description: INTERVENTIONS:  -  Assess patient's ability to carry out ADLs; assess patient's baseline for ADL function and identify physical deficits which impact ability to perform ADLs (bathing, care of mouth/teeth, toileting, grooming, dressing, etc )  - Assess/evaluate cause of self-care deficits   - Assess range of motion  - Assess patient's mobility; develop plan if impaired  - Assess patient's need for assistive devices and provide as appropriate  - Encourage maximum independence but intervene and supervise when necessary  - Involve family in performance of ADLs  - Assess for home care needs following discharge   - Consider OT consult to assist with ADL evaluation and planning for discharge  - Provide patient education as appropriate  Outcome: Progressing  Goal: Maintains/Returns to pre admission functional level  Description: INTERVENTIONS:  - Perform BMAT or MOVE assessment daily    - Set and communicate daily mobility goal to care team and patient/family/caregiver     - Collaborate with rehabilitation services on mobility goals if consulted  - Perform Range of Motion 3 times a day  - Reposition patient every 3 hours    - Dangle patient 3 times a day  - Stand patient 3 times a day  - Ambulate patient 3 times a day  - Out of bed to chair 3 times a day   - Out of bed for meals 3 times a day  - Out of bed for toileting  - Record patient progress and toleration of activity level   Outcome: Progressing     Problem: PAIN - ADULT  Goal: Verbalizes/displays adequate comfort level or baseline comfort level  Description: Interventions:  - Encourage patient to monitor pain and request assistance  - Assess pain using appropriate pain scale  - Administer analgesics based on type and severity of pain and evaluate response  - Implement non-pharmacological measures as appropriate and evaluate response  - Consider cultural and social influences on pain and pain management  - Notify physician/advanced practitioner if interventions unsuccessful or patient reports new pain  Outcome: Progressing     Problem: INFECTION - ADULT  Goal: Absence or prevention of progression during hospitalization  Description: INTERVENTIONS:  - Assess and monitor for signs and symptoms of infection  - Monitor lab/diagnostic results  - Monitor all insertion sites, i e  indwelling lines, tubes, and drains  - Monitor endotracheal if appropriate and nasal secretions for changes in amount and color  - [place] appropriate cooling/warming therapies per order  - Administer medications as ordered  - Instruct and encourage patient and family to use good hand hygiene technique  - Identify and instruct in appropriate isolation precautions for identified infection/condition  Outcome: Progressing  Goal: Absence of fever/infection during neutropenic period  Description: INTERVENTIONS:  - Monitor WBC    Outcome: Progressing     Problem: SAFETY ADULT  Goal: Patient will remain free of falls  Description: INTERVENTIONS:  - Educate patient/family on patient safety including physical limitations  - Instruct patient to call for assistance with activity   - Consult OT/PT to assist with strengthening/mobility   - Keep Call bell within reach  - Keep bed low and locked with side rails adjusted as appropriate  - Keep care items and personal belongings within reach  - Initiate and maintain comfort rounds  - Make Fall Risk Sign visible to staff  - Offer Toileting every 2 Hours, in advance of need  - Initiate/Maintain bed alarm  - Obtain necessary fall risk management equipment: alarm  - Apply yellow socks and bracelet for high fall risk patients  - Consider moving patient to room near nurses station  Outcome: Progressing  Goal: Maintain or return to baseline ADL function  Description: INTERVENTIONS:  -  Assess patient's ability to carry out ADLs; assess patient's baseline for ADL function and identify physical deficits which impact ability to perform ADLs (bathing, care of mouth/teeth, toileting, grooming, dressing, etc )  - Assess/evaluate cause of self-care deficits   - Assess range of motion  - Assess patient's mobility; develop plan if impaired  - Assess patient's need for assistive devices and provide as appropriate  - Encourage maximum independence but intervene and supervise when necessary  - Involve family in performance of ADLs  - Assess for home care needs following discharge   - Consider OT consult to assist with ADL evaluation and planning for discharge  - Provide patient education as appropriate  Outcome: Progressing  Goal: Maintains/Returns to pre admission functional level  Description: INTERVENTIONS:  - Perform BMAT or MOVE assessment daily    - Set and communicate daily mobility goal to care team and patient/family/caregiver  - Collaborate with rehabilitation services on mobility goals if consulted  - Perform Range of Motion 3 times a day  - Reposition patient every 3 hours    - Dangle patient 3 times a day  - Stand patient 3 times a day  - Ambulate patient 3 times a day  - Out of bed to chair 3 times a day   - Out of bed for meals 3 times a day  - Out of bed for toileting  - Record patient progress and toleration of activity level   Outcome: Progressing     Problem: DISCHARGE PLANNING  Goal: Discharge to home or other facility with appropriate resources  Description: INTERVENTIONS:  - Identify barriers to discharge w/patient and caregiver  - Arrange for needed discharge resources and transportation as appropriate  - Identify discharge learning needs (meds, wound care, etc )  - Arrange for interpretive services to assist at discharge as needed  - Refer to Case Management Department for coordinating discharge planning if the patient needs post-hospital services based on physician/advanced practitioner order or complex needs related to functional status, cognitive ability, or social support system  Outcome: Progressing     Problem: Knowledge Deficit  Goal: Patient/family/caregiver demonstrates understanding of disease process, treatment plan, medications, and discharge instructions  Description: Complete learning assessment and assess knowledge base    Interventions:  - Provide teaching at level of understanding  - Provide teaching via preferred learning methods  Outcome: Progressing     Problem: Prexisting or High Potential for Compromised Skin Integrity  Goal: Skin integrity is maintained or improved  Description: INTERVENTIONS:  - Identify patients at risk for skin breakdown  - Assess and monitor skin integrity  - Assess and monitor nutrition and hydration status  - Monitor labs   - Assess for incontinence   - Turn and reposition patient  - Assist with mobility/ambulation  - Relieve pressure over bony prominences  - Avoid friction and shearing  - Provide appropriate hygiene as needed including keeping skin clean and dry  - Evaluate need for skin moisturizer/barrier cream  - Collaborate with interdisciplinary team   - Patient/family teaching  - Consider wound care consult   Outcome: Progressing

## 2023-04-12 LAB
ALBUMIN SERPL BCP-MCNC: 3.1 G/DL (ref 3.5–5)
ALP SERPL-CCNC: 72 U/L (ref 34–104)
ALT SERPL W P-5'-P-CCNC: 15 U/L (ref 7–52)
ANION GAP SERPL CALCULATED.3IONS-SCNC: 10 MMOL/L (ref 4–13)
AST SERPL W P-5'-P-CCNC: 17 U/L (ref 13–39)
BILIRUB SERPL-MCNC: 0.57 MG/DL (ref 0.2–1)
BUN SERPL-MCNC: 6 MG/DL (ref 5–25)
CALCIUM ALBUM COR SERPL-MCNC: 9.3 MG/DL (ref 8.3–10.1)
CALCIUM SERPL-MCNC: 8.6 MG/DL (ref 8.4–10.2)
CHLORIDE SERPL-SCNC: 99 MMOL/L (ref 96–108)
CO2 SERPL-SCNC: 21 MMOL/L (ref 21–32)
CREAT SERPL-MCNC: 0.76 MG/DL (ref 0.6–1.3)
ERYTHROCYTE [DISTWIDTH] IN BLOOD BY AUTOMATED COUNT: 13.2 % (ref 11.6–15.1)
GFR SERPL CREATININE-BSD FRML MDRD: 90 ML/MIN/1.73SQ M
GLUCOSE SERPL-MCNC: 110 MG/DL (ref 65–140)
GLUCOSE SERPL-MCNC: 112 MG/DL (ref 65–140)
GLUCOSE SERPL-MCNC: 124 MG/DL (ref 65–140)
GLUCOSE SERPL-MCNC: 146 MG/DL (ref 65–140)
GLUCOSE SERPL-MCNC: 151 MG/DL (ref 65–140)
HCT VFR BLD AUTO: 38.8 % (ref 36.5–49.3)
HGB BLD-MCNC: 13.4 G/DL (ref 12–17)
MCH RBC QN AUTO: 29.3 PG (ref 26.8–34.3)
MCHC RBC AUTO-ENTMCNC: 34.5 G/DL (ref 31.4–37.4)
MCV RBC AUTO: 85 FL (ref 82–98)
PLATELET # BLD AUTO: 273 THOUSANDS/UL (ref 149–390)
PMV BLD AUTO: 8.3 FL (ref 8.9–12.7)
POTASSIUM SERPL-SCNC: 3.4 MMOL/L (ref 3.5–5.3)
PROCALCITONIN SERPL-MCNC: 0.37 NG/ML
PROT SERPL-MCNC: 6.3 G/DL (ref 6.4–8.4)
RBC # BLD AUTO: 4.58 MILLION/UL (ref 3.88–5.62)
SODIUM SERPL-SCNC: 130 MMOL/L (ref 135–147)
WBC # BLD AUTO: 7.9 THOUSAND/UL (ref 4.31–10.16)

## 2023-04-12 RX ORDER — POTASSIUM CHLORIDE 20 MEQ/1
20 TABLET, EXTENDED RELEASE ORAL ONCE
Status: COMPLETED | OUTPATIENT
Start: 2023-04-12 | End: 2023-04-12

## 2023-04-12 RX ADMIN — SODIUM CHLORIDE 100 ML/HR: 0.9 INJECTION, SOLUTION INTRAVENOUS at 14:10

## 2023-04-12 RX ADMIN — GABAPENTIN 300 MG: 300 CAPSULE ORAL at 17:33

## 2023-04-12 RX ADMIN — SERTRALINE HYDROCHLORIDE 25 MG: 25 TABLET ORAL at 21:56

## 2023-04-12 RX ADMIN — GABAPENTIN 300 MG: 300 CAPSULE ORAL at 08:17

## 2023-04-12 RX ADMIN — CLOPIDOGREL BISULFATE 75 MG: 75 TABLET ORAL at 08:17

## 2023-04-12 RX ADMIN — PROPRANOLOL HYDROCHLORIDE 60 MG: 60 CAPSULE, EXTENDED RELEASE ORAL at 08:18

## 2023-04-12 RX ADMIN — ENOXAPARIN SODIUM 40 MG: 100 INJECTION SUBCUTANEOUS at 08:17

## 2023-04-12 RX ADMIN — SODIUM CHLORIDE 100 ML/HR: 0.9 INJECTION, SOLUTION INTRAVENOUS at 04:16

## 2023-04-12 RX ADMIN — POTASSIUM CHLORIDE 20 MEQ: 1500 TABLET, EXTENDED RELEASE ORAL at 08:18

## 2023-04-12 RX ADMIN — CEFTRIAXONE SODIUM 1000 MG: 10 INJECTION, POWDER, FOR SOLUTION INTRAVENOUS at 17:33

## 2023-04-12 RX ADMIN — SENNOSIDES AND DOCUSATE SODIUM 2 TABLET: 8.6; 5 TABLET ORAL at 21:56

## 2023-04-12 RX ADMIN — AMLODIPINE BESYLATE 10 MG: 10 TABLET ORAL at 17:33

## 2023-04-12 RX ADMIN — GABAPENTIN 600 MG: 300 CAPSULE ORAL at 21:56

## 2023-04-12 RX ADMIN — LATANOPROST 1 DROP: 50 SOLUTION OPHTHALMIC at 21:56

## 2023-04-12 RX ADMIN — ATORVASTATIN CALCIUM 80 MG: 80 TABLET, FILM COATED ORAL at 17:33

## 2023-04-12 RX ADMIN — ACETAMINOPHEN 325MG 650 MG: 325 TABLET ORAL at 08:20

## 2023-04-12 NOTE — ASSESSMENT & PLAN NOTE
· Continue home sertraline  · Alprazolam 0 25 mg bid prn  · Decreased alprazolam to 0 25 mg daily prn

## 2023-04-12 NOTE — PLAN OF CARE
Problem: Potential for Falls  Goal: Patient will remain free of falls  Description: INTERVENTIONS:  - Educate patient/family on patient safety including physical limitations  - Instruct patient to call for assistance with activity   - Consult OT/PT to assist with strengthening/mobility   - Keep Call bell within reach  - Keep bed low and locked with side rails adjusted as appropriate  - Keep care items and personal belongings within reach  - Initiate and maintain comfort rounds  - Make Fall Risk Sign visible to staff  - Offer Toileting every 2 Hours, in advance of need  - Initiate/Maintain bed alarm  - Obtain necessary fall risk management equipment  - Apply yellow socks and bracelet for high fall risk patients  - Consider moving patient to room near nurses station  Outcome: Progressing     Problem: MOBILITY - ADULT  Goal: Maintain or return to baseline ADL function  Description: INTERVENTIONS:  -  Assess patient's ability to carry out ADLs; assess patient's baseline for ADL function and identify physical deficits which impact ability to perform ADLs (bathing, care of mouth/teeth, toileting, grooming, dressing, etc )  - Assess/evaluate cause of self-care deficits   - Assess range of motion  - Assess patient's mobility; develop plan if impaired  - Assess patient's need for assistive devices and provide as appropriate  - Encourage maximum independence but intervene and supervise when necessary  - Involve family in performance of ADLs  - Assess for home care needs following discharge   - Consider OT consult to assist with ADL evaluation and planning for discharge  - Provide patient education as appropriate  Outcome: Progressing  Goal: Maintains/Returns to pre admission functional level  Description: INTERVENTIONS:  - Perform BMAT or MOVE assessment daily    - Set and communicate daily mobility goal to care team and patient/family/caregiver     - Collaborate with rehabilitation services on mobility goals if consulted  - Perform Range of Motion 4 times a day  - Reposition patient every 2 hours    - Dangle patient 3 times a day  - Stand patient 3 times a day  - Ambulate patient 3 times a day  - Out of bed to chair 3 times a day   - Out of bed for meals 3 times a day  - Out of bed for toileting  - Record patient progress and toleration of activity level   Outcome: Progressing     Problem: PAIN - ADULT  Goal: Verbalizes/displays adequate comfort level or baseline comfort level  Description: Interventions:  - Encourage patient to monitor pain and request assistance  - Assess pain using appropriate pain scale  - Administer analgesics based on type and severity of pain and evaluate response  - Implement non-pharmacological measures as appropriate and evaluate response  - Consider cultural and social influences on pain and pain management  - Notify physician/advanced practitioner if interventions unsuccessful or patient reports new pain  Outcome: Progressing     Problem: INFECTION - ADULT  Goal: Absence or prevention of progression during hospitalization  Description: INTERVENTIONS:  - Assess and monitor for signs and symptoms of infection  - Monitor lab/diagnostic results  - Monitor all insertion sites, i e  indwelling lines, tubes, and drains  - Monitor endotracheal if appropriate and nasal secretions for changes in amount and color  - [place] appropriate cooling/warming therapies per order  - Administer medications as ordered  - Instruct and encourage patient and family to use good hand hygiene technique  - Identify and instruct in appropriate isolation precautions for identified infection/condition  Outcome: Progressing  Goal: Absence of fever/infection during neutropenic period  Description: INTERVENTIONS:  - Monitor WBC    Outcome: Progressing     Problem: SAFETY ADULT  Goal: Patient will remain free of falls  Description: INTERVENTIONS:  - Educate patient/family on patient safety including physical limitations  - Instruct patient to call for assistance with activity   - Consult OT/PT to assist with strengthening/mobility   - Keep Call bell within reach  - Keep bed low and locked with side rails adjusted as appropriate  - Keep care items and personal belongings within reach  - Initiate and maintain comfort rounds  - Make Fall Risk Sign visible to staff  - Initiate/Maintain bed alarm  - Obtain necessary fall risk management equipment  - Apply yellow socks and bracelet for high fall risk patients  - Consider moving patient to room near nurses station  Outcome: Progressing  Goal: Maintain or return to baseline ADL function  Description: INTERVENTIONS:  -  Assess patient's ability to carry out ADLs; assess patient's baseline for ADL function and identify physical deficits which impact ability to perform ADLs (bathing, care of mouth/teeth, toileting, grooming, dressing, etc )  - Assess/evaluate cause of self-care deficits   - Assess range of motion  - Assess patient's mobility; develop plan if impaired  - Assess patient's need for assistive devices and provide as appropriate  - Encourage maximum independence but intervene and supervise when necessary  - Involve family in performance of ADLs  - Assess for home care needs following discharge   - Consider OT consult to assist with ADL evaluation and planning for discharge  - Provide patient education as appropriate  Outcome: Progressing       Problem: DISCHARGE PLANNING  Goal: Discharge to home or other facility with appropriate resources  Description: INTERVENTIONS:  - Identify barriers to discharge w/patient and caregiver  - Arrange for needed discharge resources and transportation as appropriate  - Identify discharge learning needs (meds, wound care, etc )  - Arrange for interpretive services to assist at discharge as needed  - Refer to Case Management Department for coordinating discharge planning if the patient needs post-hospital services based on physician/advanced practitioner order or complex needs related to functional status, cognitive ability, or social support system  Outcome: Progressing     Problem: Knowledge Deficit  Goal: Patient/family/caregiver demonstrates understanding of disease process, treatment plan, medications, and discharge instructions  Description: Complete learning assessment and assess knowledge base    Interventions:  - Provide teaching at level of understanding  - Provide teaching via preferred learning methods  Outcome: Progressing

## 2023-04-12 NOTE — ASSESSMENT & PLAN NOTE
Results from last 7 days   Lab Units 04/12/23  0526 04/11/23  1404 04/11/23  0808 04/11/23  0204   SODIUM mmol/L 130* 128* 128* 126*  127*   · Presented with low NA of 126  · Suspecting secondary to dehydration   · Started on IV fluids which na appears to be improving  · Lasix currently on hold - urine sodium studies skewed since pt was on lasix prior to admission  · Patient sodium continues to improve on increased fluids  · Recheck in joana esposito

## 2023-04-12 NOTE — ASSESSMENT & PLAN NOTE
This is a 76-year-old male with history of longstanding multiple sclerosis, wheelchair-bound, hypertension, hyperlipidemia, neurogenic bladder with chronic suprapubic catheter in place senting with confusion  Patient gets his suprapubic catheter drained every day lives at home with his siblings  Denies any fever, chills, abdominal pain  Has had nausea and poor oral intake at home       · Initially thought to have UTI given UA with: moderate leukocytes, positive nitrite, moderate blood, innumerable WBC and bacteria  · He has history of E  coli, allergic to cephalexin, was started on Levaquin  · Urine culture pending  · Blood culture negative at 24 hours  · Consider other etiology being hyponatremia in the setting of dehydration and xanax use  · However with suspicion this is a true UTI given confusion, tube discomfort, leukocytosis, and elevated procal- WBC appearing to improve on IV antibiotics   · Decreased Xanax  · Will continue with IV antibiotics but switched to ceftriaxone after speaking with ID pharmacy  · To complete antibiotic therapy today

## 2023-04-12 NOTE — ASSESSMENT & PLAN NOTE
Lab Results   Component Value Date    HGBA1C 8 7 05/10/2021     Recent Labs     04/11/23  1633 04/11/23 2056 04/12/23  0739 04/12/23  1149   POCGLU 129 127 112 146*   · Hold oral hypoglycemics and Trulicity  · Monitor Accu-Cheks, sliding scale for coverage

## 2023-04-12 NOTE — ASSESSMENT & PLAN NOTE
· Metabolic encephalopathy likely secondary to UTI vs xanax vs dehydration vs hyponatremia  · Continue plan above  · Mental status appears improved today

## 2023-04-12 NOTE — UTILIZATION REVIEW
Continued Stay Review    Date:    4/12/23                          Current Patient Class:   Inpatient  Current Level of Care:    Med surg    HPI:72 y o  male initially admitted on    4/10 with confusion  hyponatremia    Assessment/Plan:     4/12 Continue  SHARON  Mental status improved  Continue  IVF  Sodium improving  BC  Negative thus far  Wait urine culture  Need to encourage po intake      Vital Signs:    98 5-91-18      128/60      sats  95  % RA    Pertinent Labs/Diagnostic Results:       Results from last 7 days   Lab Units 04/12/23  0526 04/11/23  0204 04/10/23  2041 04/10/23  1423   WBC Thousand/uL 7 90 11 53*  --  16 83*   HEMOGLOBIN g/dL 13 4 14 4  --  15 7   HEMATOCRIT % 38 8 41 6  --  45 3   PLATELETS Thousands/uL 273 271 290 325   NEUTROS ABS Thousands/µL  --  8 97*  --  14 03*         Results from last 7 days   Lab Units 04/12/23  0526 04/11/23  1404 04/11/23  0808 04/11/23  0204 04/10/23  2041   SODIUM mmol/L 130* 128* 128* 126*  127* 126*   POTASSIUM mmol/L 3 4* 3 8 3 6 3 8  3 9 4 1   CHLORIDE mmol/L 99 97 96 94*  94* 92*   CO2 mmol/L 21 22 22 25  24 25   ANION GAP mmol/L 10 9 10 7  9 9   BUN mg/dL 6 9 11 11  11 10   CREATININE mg/dL 0 76 0 82 0 82 0 89  0 92 0 89   EGFR ml/min/1 73sq m 90 87 87 84  82 84   CALCIUM mg/dL 8 6 8 8 9 2 9 2  9 2 9 7     Results from last 7 days   Lab Units 04/12/23  0526 04/11/23  0204 04/10/23  1423   AST U/L 17 13 13   ALT U/L 15 12 14   ALK PHOS U/L 72 80 106*   TOTAL PROTEIN g/dL 6 3* 7 3 7 9   ALBUMIN g/dL 3 1* 3 6 3 9   TOTAL BILIRUBIN mg/dL 0 57 0 88 1 20*     Results from last 7 days   Lab Units 04/12/23  1149 04/12/23  0739 04/11/23  2056 04/11/23  1633 04/11/23  1205 04/11/23  0744 04/10/23  2045   POC GLUCOSE mg/dl 146* 112 127 129 177* 120 145*     Results from last 7 days   Lab Units 04/12/23  0526 04/11/23  1404 04/11/23  0808 04/11/23  0204 04/10/23  2041 04/10/23  1423   GLUCOSE RANDOM mg/dL 110 164* 125 120  120 156* 189*     Results from last 7 days   Lab Units 04/10/23  2041   OSMOLALITY, SERUM mmol/*               Results from last 7 days   Lab Units 04/10/23  2041   TSH 3RD GENERATON uIU/mL 2 863     Results from last 7 days   Lab Units 04/12/23  0526 04/10/23  1712   PROCALCITONIN ng/ml 0 37* 0 47*     Results from last 7 days   Lab Units 04/10/23  1712   LACTIC ACID mmol/L 1 3               Results from last 7 days   Lab Units 04/10/23  2227 04/10/23  2041   OSMOLALITY, SERUM mmol/KG  --  278*   OSMO UR mmol/  --      Results from last 7 days   Lab Units 04/10/23  2023 04/10/23  1542   CLARITY UA   --  Cloudy   COLOR UA   --  Yellow   SPEC GRAV UA   --  1 025   PH UA   --  6 0   GLUCOSE UA mg/dl  --  250 (1/4%)*   KETONES UA mg/dl  --  40 (2+)*   BLOOD UA   --  Moderate*   PROTEIN UA mg/dl  --  30 (1+)*   NITRITE UA   --  Positive*   BILIRUBIN UA   --  Negative   UROBILINOGEN UA E U /dl  --  0 2   LEUKOCYTES UA   --  Moderate*   WBC UA /hpf  --  Innumerable*   RBC UA /hpf  --  10-20*   BACTERIA UA /hpf  --  Innumerable*   EPITHELIAL CELLS WET PREP /hpf  --  None Seen   SODIUM UR  21  --                                  Results from last 7 days   Lab Units 04/10/23  1723 04/10/23  1712   BLOOD CULTURE  No Growth at 24 hrs  No Growth at 24 hrs                   Medications:   Scheduled Medications:  amLODIPine, 10 mg, Oral, Daily With Dinner   And  atorvastatin, 80 mg, Oral, Daily With Dinner  cefTRIAXone, 1,000 mg, Intravenous, Q24H  clopidogrel, 75 mg, Oral, Daily  enoxaparin, 40 mg, Subcutaneous, Daily  gabapentin, 300 mg, Oral, BID  gabapentin, 600 mg, Oral, HS  insulin lispro, 1-5 Units, Subcutaneous, TID AC  latanoprost, 1 drop, Both Eyes, HS  propranolol, 60 mg, Oral, Daily  senna-docusate sodium, 2 tablet, Oral, HS  sertraline, 25 mg, Oral, HS      Continuous IV Infusions:  sodium chloride, 100 mL/hr, Intravenous, Continuous      PRN Meds:  acetaminophen, 650 mg, Oral, Q6H PRN  albuterol, 2 5 mg, Nebulization, Q6H PRN  ALPRAZolam, 0 25 mg, Oral, Daily PRN  melatonin, 3 mg, Oral, HS PRN        Discharge Plan:    TBD    Network Utilization Review Department  ATTENTION: Please call with any questions or concerns to [phone] and carefully listen to the prompts so that you are directed to the right person  All voicemails are confidential   John March all requests for admission clinical reviews, approved or denied determinations and any other requests to dedicated fax number below belonging to the campus where the patient is receiving treatment   List of dedicated fax numbers for the Facilities:  [address] DENIALS (Administrative/Medical Necessity) [phone]   [address] (Maternity/NICU/Pediatrics) [phone]   [address] Sarah Juárez [phone]   [facility] [address] [phone]   [facility] [place] [address] [address] [facility] [phone]   [address] [facility] [address] [facility] [address] [facility] [phone]

## 2023-04-12 NOTE — PROGRESS NOTES
[address] [place] [address]  Progress Note  Name: Mira Moreira  MRN: [id number]  Unit/Bed#: E4 -01 I Date of Admission: 4/10/2023   Date of Service: 4/12/2023 I Hospital Day: 2    Assessment/Plan   * Confusion  Assessment & Plan  This is a 11-year-old male with history of longstanding multiple sclerosis, wheelchair-bound, hypertension, hyperlipidemia, neurogenic bladder with chronic suprapubic catheter in place senting with confusion  Patient gets his suprapubic catheter drained every day lives at home with his siblings  Denies any fever, chills, abdominal pain  Has had nausea and poor oral intake at home  · Initially thought to have UTI given UA with: moderate leukocytes, positive nitrite, moderate blood, innumerable WBC and bacteria  · He has history of E  coli, allergic to cephalexin, was started on Levaquin  · Urine culture pending  · Blood culture negative at 24 hours  · Consider other etiology being hyponatremia in the setting of dehydration and xanax use  · However with suspicion this is a true UTI given confusion, tube discomfort, leukocytosis, and elevated procal- WBC appearing to improve on IV antibiotics   · Decreased Xanax  · Will continue with IV antibiotics but switched to ceftriaxone after speaking with ID pharmacy  · To complete antibiotic therapy today    Hyponatremia  Assessment & Plan  Results from last 7 days   Lab Units 04/12/23  0526 04/11/23  1404 04/11/23  0808 04/11/23  0204   SODIUM mmol/L 130* 128* 128* 126*  127*   · Presented with low NA of 126  · Suspecting secondary to dehydration   · Started on IV fluids which na appears to be improving  · Lasix currently on hold - urine sodium studies skewed since pt was on lasix prior to admission  · Patient sodium continues to improve on increased fluids  · Recheck in a m      Acute metabolic encephalopathy  Assessment & Plan  · Metabolic encephalopathy likely secondary to UTI vs xanax vs dehydration vs hyponatremia  · Continue plan above  · Mental status appears improved today    History of CVA (cerebrovascular accident)  Assessment & Plan  · History of CVA in October 2018  · CTA head and neck negative for any acute infarct, hemorrhage or mass effect, focal moderate to severe stenosis in P3 segment of right posterior cerebral artery  · Continue plavix and lipitor    Diabetes mellitus [facility])  Assessment & Plan  Lab Results   Component Value Date    HGBA1C 8 7 05/10/2021     Recent Labs     04/11/23  1633 04/11/23  2056 04/12/23  0739 04/12/23  1149   POCGLU 129 127 112 146*   · Hold oral hypoglycemics and Trulicity  · Monitor Accu-Cheks, sliding scale for coverage    Neurogenic bladder  Assessment & Plan  · Chronic suprapubic catheter in place    Multiple sclerosis ([facility] [address] )  Assessment & Plan  · Patient with history of longstanding multiple sclerosis  · Chronic lower extremity weakness, wheelchair-bound  · On gabapentin 300 mg twice daily and 600 mg at bedtime    Hypertension  Assessment & Plan  · Home regimen propranolol 60 mg daily, amlodipine 10 mg daily  · BP stable here    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HCC)  Assessment & Plan  · 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  · CTA head and neck stable    Depression  Assessment & Plan  · Continue home sertraline  · Alprazolam 0 25 mg bid prn  · Decreased alprazolam to 0 25 mg daily prn            VTE Pharmacologic Prophylaxis:   Pharmacologic: Enoxaparin (Lovenox)  Mechanical VTE Prophylaxis in Place: Yes    Discharge Plan: With need for continued inpatient stay for IV antibiotics  Anticipate discharge home tomorrow    Discussions with Specialists or Other Care Team Provider: nursing    Education and Discussions with Family / Patient: patient, brother via telephone    Time Spent for Care: 45 minutes  More than 50% of total time spent on counseling and coordination of care as described above      Current Length of Stay: 2 day(s)  Current Patient Status: Inpatient   Code Status: Level 1 - Full Code    Subjective:   Resting notably in bed  Reports he feels better today  Attempting to eat and drink more  Discussed discharge home likely tomorrow  Objective:     Vitals:   Temp (24hrs), Av 9 °F (37 2 °C), Min:98 5 °F (36 9 °C), Max:99 1 °F (37 3 °C)    Temp:  [98 5 °F (36 9 °C)-99 1 °F (37 3 °C)] 98 5 °F (36 9 °C)  HR:  [60-91] 91  Resp:  [18] 18  BP: (109-143)/(56-65) 128/60  SpO2:  [94 %-96 %] 95 %  There is no height or weight on file to calculate BMI  Input and Output Summary (last 24 hours): Intake/Output Summary (Last 24 hours) at 2023 1311  Last data filed at 2023 1019  Gross per 24 hour   Intake --   Output 2500 ml   Net -2500 ml       Physical Exam:     Physical Exam  Vitals and nursing note reviewed  Constitutional:       General: He is not in acute distress  Appearance: Normal appearance  He is normal weight  He is not ill-appearing, toxic-appearing or diaphoretic  HENT:      Head: Normocephalic and atraumatic  Eyes:      General: No scleral icterus  Cardiovascular:      Rate and Rhythm: Normal rate and regular rhythm  Pulmonary:      Effort: Pulmonary effort is normal  No respiratory distress  Breath sounds: Normal breath sounds  No stridor  No wheezing or rhonchi  Abdominal:      General: Bowel sounds are normal  There is no distension  Palpations: Abdomen is soft  There is no mass  Tenderness: There is no abdominal tenderness  Hernia: No hernia is present  Musculoskeletal:         General: No swelling  Cervical back: Neck supple  Skin:     General: Skin is warm and dry  Neurological:      Mental Status: He is alert and oriented to person, place, and time     Psychiatric:         Mood and Affect: Mood normal          Behavior: Behavior normal          Additional Data:     Labs:    Results from last 7 days   Lab Units 23  0526 23  0204   WBC Thousand/uL 7 90 11 53*   HEMOGLOBIN g/dL 13 4 14 4   HEMATOCRIT % 38 8 41 6   PLATELETS Thousands/uL 273 271   NEUTROS PCT %  --  78*   LYMPHS PCT %  --  13*   MONOS PCT %  --  7   EOS PCT %  --  1     Results from last 7 days   Lab Units 04/12/23  0526   POTASSIUM mmol/L 3 4*   CHLORIDE mmol/L 99   CO2 mmol/L 21   BUN mg/dL 6   CREATININE mg/dL 0 76   CALCIUM mg/dL 8 6   ALK PHOS U/L 72   ALT U/L 15   AST U/L 17           * I Have Reviewed All Lab Data Listed Above  * Additional Pertinent Lab Tests Reviewed: [address] Admission Reviewed    Imaging:    Imaging Reports Reviewed Today Include:   Imaging Personally Reviewed by Myself Includes:      Recent Cultures (last 7 days):     Results from last 7 days   Lab Units 04/10/23  1723 04/10/23  1712   BLOOD CULTURE  No Growth at 24 hrs  No Growth at 24 hrs         Last 24 Hours Medication List:   Current Facility-Administered Medications   Medication Dose Route Frequency Provider Last Rate   • acetaminophen  650 mg Oral Q6H PRN Yasmin King MD     • albuterol  2 5 mg Nebulization Q6H PRN Yasmin King MD     • ALPRAZolam  0 25 mg Oral Daily PRN Allison Milner PA-C     • amLODIPine  10 mg Oral Daily With Darrel Sanford MD      And   • atorvastatin  80 mg Oral Daily With Darrel Sanford MD     • cefTRIAXone  1,000 mg Intravenous Q24H Allison Milner PA-C 1,000 mg (04/11/23 1709)   • clopidogrel  75 mg Oral Daily Yasmin King MD     • enoxaparin  40 mg Subcutaneous Daily Yasmin King MD     • gabapentin  300 mg Oral BID Yasmin King MD     • gabapentin  600 mg Oral HS Yasmin King MD     • insulin lispro  1-5 Units Subcutaneous TID AC Yasmin King MD     • latanoprost  1 drop Both Eyes HS Yasmin King MD     • melatonin  3 mg Oral HS PRN Yasmin King MD     • propranolol  60 mg Oral Daily Yasmin King MD     • senna-docusate sodium  2 tablet Oral HS Yasmin King MD     • sertraline  25 mg Oral HS Yasmin King MD     • sodium chloride  100 mL/hr Intravenous Continuous Joon Vincent PA-C 100 mL/hr (04/12/23 0416)        Today, Patient Was Seen By: Joon Vincent PA-C    ** Please Note: This note has been constructed using a voice recognition system   **

## 2023-04-12 NOTE — ASSESSMENT & PLAN NOTE
· History of CVA in October 2018  · CTA head and neck negative for any acute infarct, hemorrhage or mass effect, focal moderate to severe stenosis in P3 segment of right posterior cerebral artery  · Continue plavix and lipitor

## 2023-04-12 NOTE — PLAN OF CARE
Problem: INFECTION - ADULT  Goal: Absence or prevention of progression during hospitalization  Description: INTERVENTIONS:  - Assess and monitor for signs and symptoms of infection  - Monitor lab/diagnostic results  - Monitor all insertion sites, i e  indwelling lines, tubes, and drains  - Monitor endotracheal if appropriate and nasal secretions for changes in amount and color  - [place] appropriate cooling/warming therapies per order  - Administer medications as ordered  - Instruct and encourage patient and family to use good hand hygiene technique  - Identify and instruct in appropriate isolation precautions for identified infection/condition  Outcome: Progressing  Goal: Absence of fever/infection during neutropenic period  Description: INTERVENTIONS:  - Monitor WBC    Outcome: Progressing

## 2023-04-13 LAB
ANION GAP SERPL CALCULATED.3IONS-SCNC: 9 MMOL/L (ref 4–13)
BACTERIA UR CULT: ABNORMAL
BACTERIA UR CULT: ABNORMAL
BUN SERPL-MCNC: 8 MG/DL (ref 5–25)
CALCIUM SERPL-MCNC: 8.8 MG/DL (ref 8.4–10.2)
CHLORIDE SERPL-SCNC: 100 MMOL/L (ref 96–108)
CO2 SERPL-SCNC: 20 MMOL/L (ref 21–32)
CREAT SERPL-MCNC: 0.7 MG/DL (ref 0.6–1.3)
GFR SERPL CREATININE-BSD FRML MDRD: 93 ML/MIN/1.73SQ M
GLUCOSE SERPL-MCNC: 121 MG/DL (ref 65–140)
GLUCOSE SERPL-MCNC: 131 MG/DL (ref 65–140)
GLUCOSE SERPL-MCNC: 160 MG/DL (ref 65–140)
GLUCOSE SERPL-MCNC: 166 MG/DL (ref 65–140)
GLUCOSE SERPL-MCNC: 203 MG/DL (ref 65–140)
POTASSIUM SERPL-SCNC: 3.7 MMOL/L (ref 3.5–5.3)
SODIUM SERPL-SCNC: 129 MMOL/L (ref 135–147)

## 2023-04-13 RX ADMIN — SERTRALINE HYDROCHLORIDE 25 MG: 25 TABLET ORAL at 22:56

## 2023-04-13 RX ADMIN — AMLODIPINE BESYLATE 10 MG: 10 TABLET ORAL at 17:35

## 2023-04-13 RX ADMIN — GABAPENTIN 300 MG: 300 CAPSULE ORAL at 09:38

## 2023-04-13 RX ADMIN — INSULIN LISPRO 1 UNITS: 100 INJECTION, SOLUTION INTRAVENOUS; SUBCUTANEOUS at 11:55

## 2023-04-13 RX ADMIN — SODIUM CHLORIDE 100 ML/HR: 0.9 INJECTION, SOLUTION INTRAVENOUS at 04:59

## 2023-04-13 RX ADMIN — GABAPENTIN 300 MG: 300 CAPSULE ORAL at 17:35

## 2023-04-13 RX ADMIN — LATANOPROST 1 DROP: 50 SOLUTION OPHTHALMIC at 22:56

## 2023-04-13 RX ADMIN — SENNOSIDES AND DOCUSATE SODIUM 2 TABLET: 8.6; 5 TABLET ORAL at 22:56

## 2023-04-13 RX ADMIN — PROPRANOLOL HYDROCHLORIDE 60 MG: 60 CAPSULE, EXTENDED RELEASE ORAL at 09:38

## 2023-04-13 RX ADMIN — INSULIN LISPRO 1 UNITS: 100 INJECTION, SOLUTION INTRAVENOUS; SUBCUTANEOUS at 09:38

## 2023-04-13 RX ADMIN — ATORVASTATIN CALCIUM 80 MG: 80 TABLET, FILM COATED ORAL at 17:35

## 2023-04-13 RX ADMIN — GABAPENTIN 600 MG: 300 CAPSULE ORAL at 22:56

## 2023-04-13 RX ADMIN — CLOPIDOGREL BISULFATE 75 MG: 75 TABLET ORAL at 09:38

## 2023-04-13 RX ADMIN — ENOXAPARIN SODIUM 40 MG: 100 INJECTION SUBCUTANEOUS at 09:38

## 2023-04-13 NOTE — UTILIZATION REVIEW
Continued Stay Review    Date: 4/13                         Current Patient Class: Ip Current Level of Care: MS    HPI:72 y o  male initially admitted on 4/10    Assessment/Plan:   Confusion improving on IV antibiotics  Blood cultures negative at 48 hr  Decreased Xanax  Therapy recommending ST rehab  IV fluids d/c today  Na 129  Lasix to begin in 1-2 days  Eating and drinking improving today        Vital Signs:   04/13/23 1735 -- -- -- 153/68 -- -- -- --   04/13/23 1513 97 5 °F (36 4 °C) 78 18 126/61 86 96 % None (Room air) Lying   04/13/23 0938 -- 93 -- 117/57 -- -- -- --   04/13/23 0730 98 2 °F (36 8 °C) 90 18 152/72 -- 98 % None (Room air) Lying   04/12/23 2332 97 9 °F (36 6 °C) 83 18 133/64 92 97 % None (Room air) Lying   04/12/23 1506 97 5 °F (36 4 °C) 76 18 141/74 92 97 % None (Room air) Sitting   04/12/23 0817 98 5 °F (36 9 °C) 91 18 128/60 86 95 % None (Room air) Lying   04/12/23 0138 -- -- -- -- -- -- None (Room air) --   04/12/23 0042 99 1 °F (37 3 °C) 60 18 136/65 96 95 % None (Room air) Lying   04/11/23 1925 99 °F (37 2 °C) 73 18 143/64 97 96 % None (Room air) Lying   04/11/23 1441 98 8 °F (37 1 °C) 82 18 109/56 62 Abnormal  94 % None (Room air) Lying   04/11/23 0700 -- -- -- 141/62 -- -- -- Lying     Pertinent Labs/Diagnostic Results:       Results from last 7 days   Lab Units 04/12/23  0526 04/11/23  0204 04/10/23  2041 04/10/23  1423   WBC Thousand/uL 7 90 11 53*  --  16 83*   HEMOGLOBIN g/dL 13 4 14 4  --  15 7   HEMATOCRIT % 38 8 41 6  --  45 3   PLATELETS Thousands/uL 273 271 290 325   NEUTROS ABS Thousands/µL  --  8 97*  --  14 03*         Results from last 7 days   Lab Units 04/13/23  0504 04/12/23  0526 04/11/23  1404 04/11/23  0808 04/11/23  0204   SODIUM mmol/L 129* 130* 128* 128* 126*  127*   POTASSIUM mmol/L 3 7 3 4* 3 8 3 6 3 8  3 9   CHLORIDE mmol/L 100 99 97 96 94*  94*   CO2 mmol/L 20* 21 22 22 25  24   ANION GAP mmol/L 9 10 9 10 7  9   BUN mg/dL 8 6 9 11 11  11   CREATININE mg/dL 0 70 0 76 0 82 0 82 0 89  0 92   EGFR ml/min/1 73sq m 93 90 87 87 84  82   CALCIUM mg/dL 8 8 8 6 8 8 9 2 9 2  9 2     Results from last 7 days   Lab Units 04/12/23  0526 04/11/23  0204 04/10/23  1423   AST U/L 17 13 13   ALT U/L 15 12 14   ALK PHOS U/L 72 80 106*   TOTAL PROTEIN g/dL 6 3* 7 3 7 9   ALBUMIN g/dL 3 1* 3 6 3 9   TOTAL BILIRUBIN mg/dL 0 57 0 88 1 20*     Results from last 7 days   Lab Units 04/13/23  1548 04/13/23  1133 04/13/23  0814 04/12/23  2111 04/12/23  1612 04/12/23  1149 04/12/23  0739 04/11/23  2056 04/11/23  1633 04/11/23  1205 04/11/23  0744 04/10/23  2045   POC GLUCOSE mg/dl 121 166* 203* 151* 124 146* 112 127 129 177* 120 145*     Results from last 7 days   Lab Units 04/13/23  0504 04/12/23  0526 04/11/23  1404 04/11/23  0808 04/11/23  0204 04/10/23  2041 04/10/23  1423   GLUCOSE RANDOM mg/dL 131 110 164* 125 120  120 156* 189*     Results from last 7 days   Lab Units 04/10/23  2041   OSMOLALITY, SERUM mmol/*     Results from last 7 days   Lab Units 04/10/23  2041   TSH 3RD GENERATON uIU/mL 2 863     Results from last 7 days   Lab Units 04/12/23  0526 04/10/23  1712   PROCALCITONIN ng/ml 0 37* 0 47*     Results from last 7 days   Lab Units 04/10/23  1712   LACTIC ACID mmol/L 1 3     Results from last 7 days   Lab Units 04/10/23  2227 04/10/23  2041   OSMOLALITY, SERUM mmol/KG  --  278*   OSMO UR mmol/  --      Results from last 7 days   Lab Units 04/10/23  2023 04/10/23  1542   CLARITY UA   --  Cloudy   COLOR UA   --  Yellow   SPEC GRAV UA   --  1 025   PH UA   --  6 0   GLUCOSE UA mg/dl  --  250 (1/4%)*   KETONES UA mg/dl  --  40 (2+)*   BLOOD UA   --  Moderate*   PROTEIN UA mg/dl  --  30 (1+)*   NITRITE UA   --  Positive*   BILIRUBIN UA   --  Negative   UROBILINOGEN UA E U /dl  --  0 2   LEUKOCYTES UA   --  Moderate*   WBC UA /hpf  --  Innumerable*   RBC UA /hpf  --  10-20*   BACTERIA UA /hpf  --  Innumerable*   EPITHELIAL CELLS WET PREP /hpf  --  None Seen   SODIUM UR  21  --      Results from last 7 days   Lab Units 04/10/23  1723 04/10/23  1712 04/10/23  1542   BLOOD CULTURE  No Growth at 48 hrs  No Growth at 48 hrs  --    URINE CULTURE   --   --  >100,000 cfu/ml Escherichia coli*  40,000-49,000 cfu/ml Enterococcus faecalis*         Medications:   Scheduled Medications:  amLODIPine, 10 mg, Oral, Daily With Dinner   And  atorvastatin, 80 mg, Oral, Daily With Dinner  clopidogrel, 75 mg, Oral, Daily  enoxaparin, 40 mg, Subcutaneous, Daily  gabapentin, 300 mg, Oral, BID  gabapentin, 600 mg, Oral, HS  insulin lispro, 1-5 Units, Subcutaneous, TID AC  latanoprost, 1 drop, Both Eyes, HS  propranolol, 60 mg, Oral, Daily  senna-docusate sodium, 2 tablet, Oral, HS  sertraline, 25 mg, Oral, HS      Continuous IV Infusions:  IV NSS d/c  4/13     PRN Meds:  acetaminophen, 650 mg, Oral, Q6H PRN - x 1 4/12  albuterol, 2 5 mg, Nebulization, Q6H PRN  ALPRAZolam, 0 25 mg, Oral, Daily PRN  melatonin, 3 mg, Oral, HS PRN    Discharge Plan: rehab    Network Utilization Review Department  ATTENTION: Please call with any questions or concerns to [phone] and carefully listen to the prompts so that you are directed to the right person  All voicemails are confidential   Juan Purcell all requests for admission clinical reviews, approved or denied determinations and any other requests to dedicated fax number below belonging to the campus where the patient is receiving treatment   List of dedicated fax numbers for the Facilities:  [address] DENIALS (Administrative/Medical Necessity) [phone]   [address] (Maternity/NICU/Pediatrics) Mariola Bennett [phone]   [facility] [address] [phone]   [address] 310 [facility] 134 556 [facility] [phone]

## 2023-04-13 NOTE — PROGRESS NOTES
[address] [place] [address]  Progress Note  Name: Koki Horn  MRN: [id number]  Unit/Bed#: E4 -01 I Date of Admission: 4/10/2023   Date of Service: 4/13/2023 I Hospital Day: 3    Assessment/Plan   * Confusion  Assessment & Plan  This is a 48-year-old male with history of longstanding multiple sclerosis, wheelchair-bound, hypertension, hyperlipidemia, neurogenic bladder with chronic suprapubic catheter in place senting with confusion  Patient gets his suprapubic catheter drained every day lives at home with his siblings  Denies any fever, chills, abdominal pain  Has had nausea and poor oral intake at home       · Initially thought to have UTI given UA with: moderate leukocytes, positive nitrite, moderate blood, innumerable WBC and bacteria  · He has history of E  coli, allergic to cephalexin, was started on Levaquin  · Blood culture negative at 48 hours  · Consider other etiology being hyponatremia in the setting of dehydration and xanax use  · However with suspicion this is a true UTI given confusion, tube discomfort, leukocytosis, and elevated procal- WBC appearing to improve on IV antibiotics   · Decreased Xanax  · Completed 3-day IV antibiotic course  · Mental status appears to be improving  · However PT OT now recommending short-term rehab given deconditioned status    Hyponatremia  Assessment & Plan  Results from last 7 days   Lab Units 04/13/23  0504 04/12/23  0526 04/11/23  1404 04/11/23  0808   SODIUM mmol/L 129* 130* 128* 128*   · Presented with low NA of 126  · Suspecting secondary to dehydration   · Started on IV fluids which na appears to be improving  · Lasix currently on hold - urine sodium studies skewed since pt was on lasix prior to admission  · Patient sodium continues to improve on increased fluids  · We will discontinue IV fluids at this time given approaching euvolemic status  · Expect to begin Lasix in the next 1 to 2 days  · Patient now eating and drinking regularly  · Recheck sodium in the a m  Acute metabolic encephalopathy  Assessment & Plan  · Metabolic encephalopathy likely secondary to UTI vs xanax vs dehydration vs hyponatremia  · Continue plan above  · Mental status appears improved today    History of CVA (cerebrovascular accident)  Assessment & Plan  · History of CVA in October 2018  · CTA head and neck negative for any acute infarct, hemorrhage or mass effect, focal moderate to severe stenosis in P3 segment of right posterior cerebral artery  · Continue plavix and lipitor    Diabetes mellitus [facility])  Assessment & Plan  Lab Results   Component Value Date    HGBA1C 8 7 05/10/2021     Recent Labs     04/12/23  1612 04/12/23  2111 04/13/23  0814 04/13/23  1133   POCGLU 124 151* 203* 166*   · Hold oral hypoglycemics and Trulicity  · Monitor Accu-Cheks, sliding scale for coverage    Neurogenic bladder  Assessment & Plan  · Chronic suprapubic catheter in place    Multiple sclerosis ([facility] [address] )  Assessment & Plan  · Patient with history of longstanding multiple sclerosis  · Chronic lower extremity weakness, wheelchair-bound  · On gabapentin 300 mg twice daily and 600 mg at bedtime    Hypertension  Assessment & Plan  · Home regimen propranolol 60 mg daily, amlodipine 10 mg daily  · BP stable here    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HCC)  Assessment & Plan  · 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  · CTA head and neck stable    Depression  Assessment & Plan  · Continue home sertraline  · Alprazolam 0 25 mg bid prn  · Decreased alprazolam to 0 25 mg daily prn          VTE Pharmacologic Prophylaxis:   Pharmacologic: Enoxaparin (Lovenox)  Mechanical VTE Prophylaxis in Place: Yes    Discharge Plan:  With need for continued inpatient stay for short-term rehab    Discussions with Specialists or Other Care Team Provider: Nursing, case management    Education and Discussions with Family / Patient: Patient    Time Spent for Care: 45 minutes  More than 50% of total time spent on counseling and coordination of care as described above  Current Length of Stay: 3 day(s)  Current Patient Status: Inpatient   Code Status: Level 1 - Full Code    Subjective:   Patient resting in bed  He reports difficulty transferring yesterday  He is starting to eat and drink better  Patient will now need rehab  Ongoing discussion  Objective:     Vitals:   Temp (24hrs), Av 9 °F (36 6 °C), Min:97 5 °F (36 4 °C), Max:98 2 °F (36 8 °C)    Temp:  [97 5 °F (36 4 °C)-98 2 °F (36 8 °C)] 97 5 °F (36 4 °C)  HR:  [78-93] 78  Resp:  [18] 18  BP: (117-152)/(57-72) 126/61  SpO2:  [96 %-98 %] 96 %  There is no height or weight on file to calculate BMI  Input and Output Summary (last 24 hours): Intake/Output Summary (Last 24 hours) at 2023 1559  Last data filed at 2023 0505  Gross per 24 hour   Intake 508 33 ml   Output 2850 ml   Net -2341 67 ml       Physical Exam:     Physical Exam  Vitals and nursing note reviewed  Constitutional:       General: He is not in acute distress  Appearance: Normal appearance  He is normal weight  He is not ill-appearing, toxic-appearing or diaphoretic  HENT:      Head: Normocephalic and atraumatic  Eyes:      General: No scleral icterus  Cardiovascular:      Rate and Rhythm: Normal rate and regular rhythm  Pulmonary:      Effort: Pulmonary effort is normal  No respiratory distress  Breath sounds: Normal breath sounds  No stridor  No wheezing or rhonchi  Abdominal:      General: Bowel sounds are normal  There is no distension  Palpations: Abdomen is soft  There is no mass  Tenderness: There is no abdominal tenderness  Hernia: No hernia is present  Genitourinary:     Comments: Suprapubic tube in place draining yellow urine  Musculoskeletal:         General: No swelling  Cervical back: Neck supple  Skin:     General: Skin is warm and dry     Neurological:      Mental Status: Mental status is at baseline  Psychiatric:         Mood and Affect: Mood normal          Behavior: Behavior normal          Additional Data:     Labs:    Results from last 7 days   Lab Units 04/12/23  0526 04/11/23  0204   WBC Thousand/uL 7 90 11 53*   HEMOGLOBIN g/dL 13 4 14 4   HEMATOCRIT % 38 8 41 6   PLATELETS Thousands/uL 273 271   NEUTROS PCT %  --  78*   LYMPHS PCT %  --  13*   MONOS PCT %  --  7   EOS PCT %  --  1     Results from last 7 days   Lab Units 04/13/23  0504 04/12/23  0526   POTASSIUM mmol/L 3 7 3 4*   CHLORIDE mmol/L 100 99   CO2 mmol/L 20* 21   BUN mg/dL 8 6   CREATININE mg/dL 0 70 0 76   CALCIUM mg/dL 8 8 8 6   ALK PHOS U/L  --  72   ALT U/L  --  15   AST U/L  --  17           * I Have Reviewed All Lab Data Listed Above  * Additional Pertinent Lab Tests Reviewed: [address] Admission Reviewed    Imaging:    Imaging Reports Reviewed Today Include:   Imaging Personally Reviewed by Myself Includes:      Recent Cultures (last 7 days):     Results from last 7 days   Lab Units 04/10/23  1723 04/10/23  1712 04/10/23  1542   BLOOD CULTURE  No Growth at 48 hrs  No Growth at 48 hrs    --    URINE CULTURE   --   --  >100,000 cfu/ml Escherichia coli*       Last 24 Hours Medication List:   Current Facility-Administered Medications   Medication Dose Route Frequency Provider Last Rate   • acetaminophen  650 mg Oral Q6H PRN Yane Kern MD     • albuterol  2 5 mg Nebulization Q6H PRN Yane Kern MD     • ALPRAZolam  0 25 mg Oral Daily PRN Martin Diaz PA-C     • amLODIPine  10 mg Oral Daily With Marizol Flaherty MD      And   • atorvastatin  80 mg Oral Daily With Marizol Flaherty MD     • clopidogrel  75 mg Oral Daily Yane Kern MD     • enoxaparin  40 mg Subcutaneous Daily Yane Kern MD     • gabapentin  300 mg Oral BID Yane Kern MD     • gabapentin  600 mg Oral HS Yane Kern MD     • insulin lispro  1-5 Units Subcutaneous TID Gabby Wild MD     • latanoprost  1 drop Both Eyes HS Cinthia Joseph MD     • melatonin  3 mg Oral HS PRN Cinthia Joseph MD     • propranolol  60 mg Oral Daily Cinthia Joseph MD     • senna-docusate sodium  2 tablet Oral HS Cinthia Joseph MD     • sertraline  25 mg Oral HS Cinthia Joseph MD          Today, Patient Was Seen By: Nisa Soria PA-C    ** Please Note: This note has been constructed using a voice recognition system   **

## 2023-04-13 NOTE — ASSESSMENT & PLAN NOTE
This is a 78-year-old male with history of longstanding multiple sclerosis, wheelchair-bound, hypertension, hyperlipidemia, neurogenic bladder with chronic suprapubic catheter in place senting with confusion  Patient gets his suprapubic catheter drained every day lives at home with his siblings  Denies any fever, chills, abdominal pain  Has had nausea and poor oral intake at home       · Initially thought to have UTI given UA with: moderate leukocytes, positive nitrite, moderate blood, innumerable WBC and bacteria  · He has history of E  coli, allergic to cephalexin, was started on Levaquin  · Blood culture negative at 48 hours  · Consider other etiology being hyponatremia in the setting of dehydration and xanax use  · However with suspicion this is a true UTI given confusion, tube discomfort, leukocytosis, and elevated procal- WBC appearing to improve on IV antibiotics   · Decreased Xanax  · Completed 3-day IV antibiotic course  · Mental status appears to be improving  · However PT OT now recommending short-term rehab given deconditioned status

## 2023-04-13 NOTE — PHYSICAL THERAPY NOTE
PT EVALUATION    Pt  Name: Sapphire Farris  Pt  Age: 67 y o  MRN: [id number]  LENGTH OF STAY: 3      Admitting Diagnoses:   Multiple sclerosis ([address] ) [G35]  Hyponatremia [E87 1]  Urinary tract infection [N39 0]    Past Medical History:   Diagnosis Date    Acute laryngitis     Acute nonsuppurative otitis media, unspecified laterality     Arm weakness     Arthritis     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HCC)     Bladder infection     Bronchitis     Constipation     Cough     Diabetes mellitus (HCC)     Dizziness     Dysfunction of eustachian tube     Erectile dysfunction of non-organic origin     Fatigue     Glaucoma     Hiatal hernia     Hypertension     Imbalance     Leg muscle spasm     MS (multiple sclerosis) (HCC)     Nephrolithiasis     Neurogenic bladder     No natural teeth     Sinus pain     Spinal stenosis     Strain of thoracic region     Stroke [facility])     Suprapubic catheter ([facility] [address] )        Past Surgical History:   Procedure Laterality Date    APPENDECTOMY      BRAIN SURGERY      Coil placed in aneurysm    CYSTOSCOPY      CYSTOSCOPY      CYSTOSCOPY  06/11/2018    CYSTOSCOPY  01/15/2021    EYE SURGERY      transscleral cyclophotocoagulation noncontact YAG laser    MYRINGOTOMY      with ventilation tube insertion    AZ LITHOLAPAXY SMPL/SM <2 5 CM N/A 5/7/2019    Procedure: CYSTOSCOPY, holmium laser litholapaxy of bladder stones, EXCHANGE OF SP TUBE;  Surgeon: Joelle Mccallum MD;  Location: [facility];  Service: Urology    SUPRAPUBIC CATHETER INSERTION         Imaging Studies:  CTA head and neck with and without contrast   Final Result by Tunde Weir MD (04/10 9357)         1  No evidence of acute infarct, intracranial hemorrhage or mass  2   No evidence of residual basilar tip aneurysm  3   Focal moderate to severe stenosis in the P3 segment of the right posterior cerebral artery  4   No hemodynamically significant stenosis, dissection or occlusion of the carotid or vertebral arteries  Workstation performed: [id number]         XR chest 1 view portable   ED Interpretation by Yasmin Maravilla MD (04/10 1500)   I interpreted this XR as No acute cardiopulmonary process        Final Result by Marcus Carter MD (04/11 8722)      No acute cardiopulmonary disease  Workstation performed: [id number]                04/13/23 0859   PT Last Visit   PT Visit Date 04/13/23   Note Type   Note type Evaluation   Additional Comments pt supine in bed pre session   Pain Assessment   Pain Assessment Tool 0-10   Pain Score 7   Pain Location/Orientation Orientation: Left; Location: Hip   Hospital Pain Intervention(s) Repositioned; Ambulation/increased activity; Emotional support; Rest   Restrictions/Precautions   Weight Bearing Precautions Per Order No   Other Precautions Bed Alarm; Chair Alarm; Fall Risk;Pain;Contact/isolation   Home Living   Type of [address] [place] [address] One level;Ramped entrance;Stairs to enter without rails   Bathroom Shower/Tub Walk-in shower   Bathroom Toilet Standard   Bathroom Equipment Grab bars in shower; Shower chair;Commode;Grab bars around toilet   Bathroom Accessibility Accessible via wheelchair   [address] [place] [address] Walker;Cane;Wheelchair-manual;Hospital bed   Additional Comments Ramp + 1 DAI  Prior Function   Level of [place] Independent with ADLs;Modified independent with wheelchair  (pt reported (I) w/ transfers)   Lives With Family  (brother and sister)   Joo Hutton Help From Family; Other (Comment)  (goes to [facility] 1-3x/wk per needed)   Falls in the last 6 months 0   Vocational Retired   Comments PTA, pt reports modified (I) amb w/ w/c and transfers  reports (I) ADLs  (-)   goes to [employer] 1-3x/wk per needed     General   Additional Pertinent History MS, hx stroke, neurogenic bladder w/ suprapubic catheter   Family/Caregiver Present No   Cognition   Overall Cognitive Status Impaired   Arousal/Participation Alert   Attention Attends with cues to redirect Orientation Level Oriented to person;Oriented to place;Oriented to situation  (time to year)   Following Commands Follows one step commands without difficulty   Comments pt pleasant and cooperative   Subjective   Subjective pt agreeable to therapy   RUE Assessment   RUE Assessment   (see OT eval)   LUE Assessment   LUE Assessment   (see OT eval)   RLE Assessment   RLE Assessment X   Strength RLE   R Hip Flexion 2-/5   R Knee Flexion 3-/5   R Knee Extension 2+/5   R Ankle Dorsiflexion   (clonus)   R Ankle Plantar Flexion 2-/5   LLE Assessment   LLE Assessment WFL  (grossly 3+/5; hip flx 3/5)   Coordination   Movements are Fluid and Coordinated 0   Sensation WFL   Bed Mobility   Supine to Sit 2  Maximal assistance   Additional items Assist x 2;HOB elevated; Bedrails; Increased time required;Verbal cues;LE management   Sit to Supine Unable to assess   Additional Comments V/c for hand placement and safety techniques  Pt sitting bedside chair post session  Transfers   Sit to Stand 2  Maximal assistance   Additional items Assist x 2;Bedrails; Increased time required;Verbal cues; Other  (to RW)   Stand to Sit 2  Maximal assistance   Additional items Assist x 2;Bedrails; Increased time required;Verbal cues; Other  (from [address] [place] [address])   Additional Comments pt required v/c hand placement, safety techniques, and assistance for LE set up prior to transfer  Ambulation/Elevation   Gait pattern Not appropriate   Ambulation/Elevation Additional Comments attempted lateral steps but pt unable to advance LE at this time depiste Max A x 2 w/ RW     Balance   Static Sitting Poor +  (intermittent Min A required for static sitting w/ B/L UE support on bedrails)   Dynamic Sitting Poor   Static Standing Poor   Dynamic Standing Poor -   Ambulatory Zero   Endurance Deficit   Endurance Deficit Yes   Endurance Deficit Description pt expressed fatigue after returning to sit   Activity Tolerance   Activity Tolerance Patient limited by fatigue;Treatment limited secondary to medical complications (Comment)   Medical Staff Made Aware KARLA Day   Nurse Made Aware RN [facility]   Assessment   Prognosis Fair   Problem List Decreased strength;Decreased range of motion;Decreased endurance; Impaired balance;Decreased mobility; Decreased coordination; Impaired tone;Pain;Decreased safety awareness   Assessment Pt 67 y o  male w/ known MS, hx stroke, DM2, and neurogenic bladder w/ suprapubic catheter presented to [facility] on 4/10/2023 w/ increased confusion  Pt admitted for acute encephalopathy, and UTI  PT eval and tx order w/ up and OOB as tolerated placed  PTA, pt was independent w/ transfers and Mod I w/ manual w/c, has ramp + 1  DAI and lives w/ brother and sister in 1 level home  Upon evaluation, pt exhibits weakness, impaired balance, pain, decreased activity tolerance, fall risk and impaired tone as noted in flow sheet  Pt demonstrated bed mobility w/ Max A x 2 and transfers requiring verbal cuing for proper mechanics and safety  Pt was unable to take lateral steps despite Max A x 2 + RW  No gross complaints of dizziness, lightheadedness or SOB  Pt tolerated further mobility training after IE  Please see additional tx below for details  The above mentioned impairments limit pt's ability for independent functional mobility hence will benefit from skilled PT during hospital stay to improve function  The patient's AM-PAC Basic Mobility Inpatient Short Form Raw Score is 6   A Raw score of less than or equal to 16 suggests the patient may benefit from discharge to post-acute rehabilitation services  Please also refer to the recommendation of the Physical Therapist for safe discharge planning  PT will recommend post acute rehabilitation services upon d/c from acute care once medically managed to improve functional mobility to baseline  Education provided to pt regarding importance of PT   Continue to encourage mobilization w/ nursing as tolerated to prevent further functional decline  Aparna Persons for [facility]  Nursing notified  Pt tolerated session well  Pt at end of session, in stable condition, seated in bedside chair with all needs within reach  Co-eval with OT necessary for pt's best interest and medical complexity  Please see associated tx note for further details  Goals   Patient Goals to go home   STG Expiration Date 04/23/23   Short Term Goal #1 Within 10 days, to progress towards baseline, improve safety, and decrease caregiver burden, pt to: 1  Improve strength by at least 1 grade in all ROM   2  Improve balance by at least 1 grade  3  Improve bed mobility to Mod A x 2   4  Improve assistance level to Mod A x 2 w/ transfers w/ appropriate method & AD  5 demonstrate safe and appropriate w/c self-propelling strategy w/ Supervision for 150'  6  Pt/ family education  PT Treatment Day 1   Plan   Treatment/Interventions Functional transfer training;LE strengthening/ROM; Therapeutic exercise; Endurance training;Patient/family training;Bed mobility;Spoke to nursing;OT   PT Frequency 3-5x/wk   Recommendation   PT Discharge Recommendation Post acute rehabilitation services   AM-PAC Basic Mobility Inpatient   Turning in Flat Bed Without Bedrails 1   Lying on Back to Sitting on Edge of Flat Bed Without Bedrails 1   Moving Bed to Chair 1   Standing Up From Chair Using Arms 1   Walk in Room 1   Climb 3-5 Stairs With Railing 1   Basic Mobility Inpatient Raw Score 6   Highest Level Of Mobility   JH-HLM Goal 2: Bed activities/Dependent transfer   JH-HLM Achieved 4: Move to chair/commode   Additional Treatment Session   Start Time 0919   End Time 0929   Treatment Assessment At end of initial evaluation and beginning of additional treatment session, pt seated EOB w/ therapy staff  Pt was unable to take lateral steps w/ RW and Max A x 2 during IE  Pt performed sit-stand transfer using QuickMove, elevated bed, and Mod A x 2 to bedside chair   Pt required v/c for appropriate hand placement and safety techniques  Pt required physical assistance to place R LE appropriately on device  /71 after transfer to bedside chair  Pt continues to demonstrate weakness, impaired tone (R LE), impaired balance, fall risk, and decreased activity tolerance  The above mentioned impairments limit pt's ability for independent functional mobility hence will benefit from skilled PT during hospital stay to improve function  PT to continue to see per POC  Please see IE assessment for D/C recommendation  Pt tolerated session well but continues to be limited by fatigue  Pt at end of session, in stable condition, seated in bedside chair, alarm activated, and all needs within reach  Please refer to IE assessment for further details  Equipment Use hospital bed, Perry Jurist, bedside chair   Additional Treatment Day 1   End of Consult   Patient Position at End of Consult Bedside chair;Bed/Chair alarm activated; All needs within reach   End of Consult Comments pt in stable condition, sitting in bedside chair as requested, alarm activated, w/ all needs within reach     Hx/personal factors: co-morbidities, advanced age, mutliple lines, use of AD, dec cognition, pain, fall risk, and assist w/ ADL's  Examination: dec mobility, dec balance, dec endurance, dec amb, risk for falls, pain, dec cognition  Clinical: unpredictable (ongoing medical status, abnormal lab values, risk for falls, and pain mgt)  Complexity: high    Jerre Sour PTS

## 2023-04-13 NOTE — ASSESSMENT & PLAN NOTE
Results from last 7 days   Lab Units 04/13/23  0504 04/12/23  0526 04/11/23  1404 04/11/23  0808   SODIUM mmol/L 129* 130* 128* 128*   · Presented with low NA of 126  · Suspecting secondary to dehydration   · Started on IV fluids which na appears to be improving  · Lasix currently on hold - urine sodium studies skewed since pt was on lasix prior to admission  · Patient sodium continues to improve on increased fluids  · We will discontinue IV fluids at this time given approaching euvolemic status  · Expect to begin Lasix in the next 1 to 2 days  · Patient now eating and drinking regularly  · Recheck sodium in the a m

## 2023-04-13 NOTE — PLAN OF CARE
Problem: OCCUPATIONAL THERAPY ADULT  Goal: Performs self-care activities at highest level of function for planned discharge setting  See evaluation for individualized goals  Description: Treatment Interventions: ADL retraining, Functional transfer training, UE strengthening/ROM, Endurance training, Patient/family training, Equipment evaluation/education, Compensatory technique education, Continued evaluation, Energy conservation, Activityengagement          See flowsheet documentation for full assessment, interventions and recommendations  Note: Limitation: Decreased ADL status, Decreased UE strength, Decreased Safe judgement during ADL, Decreased cognition, Decreased endurance, Decreased self-care trans, Decreased high-level ADLs  Prognosis: Fair  Assessment: Pt is a 67 y o  male seen for OT evaluation s/p adm to [facility] on [facility]10[facility]23 w/ Confusion, Hyponatremia, Acute metabolic encephalopathy  CTA head/neck: negative for any acute infarct, hemorrhage, or mass effect  Comorbidities affecting pt’s functional performance include a significant PMH of Arthritis, DM, Glaucoma, HTN, MS, Neurogenic bladder, Spinal stenosis, and Stroke  Pt with active OT orders and activity orders for Up and OOB as tolerated   Pt lives with brother and sister in a one level house with ramp +1 step to enter  Pt reports attending [employer] 1-3x/wk for a few hours per day  (-) home alone  At baseline, pt reports requiring assist w/ ADLs and IADLs  Pt reports Mod I for SPTs to/from W/C and Mod I for W/C mobility  Pt non-ambulatory at baseline  (-)   Denies falls PTA   Upon evaluation, pt currently requires Min A for UB ADLs, Max A for LB ADLs, Max A for toileting, Max A of 2 for bed mobility, and Max-Mod A of 2 for functional transfers 2* the following deficits impacting occupational performance: limited functional reach, decreased strength, decreased balance, decreased tolerance, impaired memory and decreased safety awareness  These impairments, as well at pt’s fall risk, difficulty performing transfers, steps to enter environment, difficulty performing ADLS and limited insight into deficits limit pt’s ability to safely engage in all baseline areas of occupation  Based on the aforementioned OT evaluation, functional performance deficits, and assessments, pt has been identified as a High complexity evaluation  Pt to continue to benefit from continued acute OT services during hospital stay to address defined deficits and to maximize level of functional independence in the following Occupational Performance areas: grooming, bathing/shower, toilet hygiene, dressing, medication management, health maintenance, functional mobility, community mobility, clothing management and social participation  From OT standpoint, recommend STR upon D/C   OT will continue to follow pt 3-5x/wk to address the following goals to  w/in 10-14 days:     OT Discharge Recommendation: Post acute rehabilitation services

## 2023-04-13 NOTE — PLAN OF CARE
Problem: Potential for Falls  Goal: Patient will remain free of falls  Description: INTERVENTIONS:  - Educate patient/family on patient safety including physical limitations  - Instruct patient to call for assistance with activity   - Consult OT/PT to assist with strengthening/mobility   - Keep Call bell within reach  - Keep bed low and locked with side rails adjusted as appropriate  - Keep care items and personal belongings within reach  - Initiate and maintain comfort rounds  - Make Fall Risk Sign visible to staff  - Offer Toileting every 2 Hours, in advance of need  - Initiate/Maintain bed alarm  - Obtain necessary fall risk management equipment: bed alarm  - Apply yellow socks and bracelet for high fall risk patients  - Consider moving patient to room near nurses station  Outcome: Progressing     Problem: MOBILITY - ADULT  Goal: Maintain or return to baseline ADL function  Description: INTERVENTIONS:  -  Assess patient's ability to carry out ADLs; assess patient's baseline for ADL function and identify physical deficits which impact ability to perform ADLs (bathing, care of mouth/teeth, toileting, grooming, dressing, etc )  - Assess/evaluate cause of self-care deficits   - Assess range of motion  - Assess patient's mobility; develop plan if impaired  - Assess patient's need for assistive devices and provide as appropriate  - Encourage maximum independence but intervene and supervise when necessary  - Involve family in performance of ADLs  - Assess for home care needs following discharge   - Consider OT consult to assist with ADL evaluation and planning for discharge  - Provide patient education as appropriate  Outcome: Progressing  Goal: Maintains/Returns to pre admission functional level  Description: INTERVENTIONS:  - Perform BMAT or MOVE assessment daily    - Set and communicate daily mobility goal to care team and patient/family/caregiver     - Collaborate with rehabilitation services on mobility goals if consulted  - Perform Range of Motion 3 times a day  - Reposition patient every 2 hours    - Dangle patient 3 times a day  - Stand patient 3 times a day  - Ambulate patient 3 times a day  - Out of bed to chair 3 times a day   - Out of bed for meals 3 times a day  - Out of bed for toileting  - Record patient progress and toleration of activity level   Outcome: Progressing     Problem: PAIN - ADULT  Goal: Verbalizes/displays adequate comfort level or baseline comfort level  Description: Interventions:  - Encourage patient to monitor pain and request assistance  - Assess pain using appropriate pain scale  - Administer analgesics based on type and severity of pain and evaluate response  - Implement non-pharmacological measures as appropriate and evaluate response  - Consider cultural and social influences on pain and pain management  - Notify physician/advanced practitioner if interventions unsuccessful or patient reports new pain  Outcome: Progressing     Problem: INFECTION - ADULT  Goal: Absence or prevention of progression during hospitalization  Description: INTERVENTIONS:  - Assess and monitor for signs and symptoms of infection  - Monitor lab/diagnostic results  - Monitor all insertion sites, i e  indwelling lines, tubes, and drains  - Monitor endotracheal if appropriate and nasal secretions for changes in amount and color  - [place] appropriate cooling/warming therapies per order  - Administer medications as ordered  - Instruct and encourage patient and family to use good hand hygiene technique  - Identify and instruct in appropriate isolation precautions for identified infection/condition  Outcome: Progressing  Goal: Absence of fever/infection during neutropenic period  Description: INTERVENTIONS:  - Monitor WBC    Outcome: Progressing     Problem: SAFETY ADULT  Goal: Patient will remain free of falls  Description: INTERVENTIONS:  - Educate patient/family on patient safety including physical limitations  - Instruct patient to call for assistance with activity   - Consult OT/PT to assist with strengthening/mobility   - Keep Call bell within reach  - Keep bed low and locked with side rails adjusted as appropriate  - Keep care items and personal belongings within reach  - Initiate and maintain comfort rounds  - Make Fall Risk Sign visible to staff  - Offer Toileting every 2 Hours, in advance of need  - Initiate/Maintain bed alarm  - Obtain necessary fall risk management equipment: bed alarm  - Apply yellow socks and bracelet for high fall risk patients  - Consider moving patient to room near nurses station  Outcome: Progressing  Goal: Maintain or return to baseline ADL function  Description: INTERVENTIONS:  -  Assess patient's ability to carry out ADLs; assess patient's baseline for ADL function and identify physical deficits which impact ability to perform ADLs (bathing, care of mouth/teeth, toileting, grooming, dressing, etc )  - Assess/evaluate cause of self-care deficits   - Assess range of motion  - Assess patient's mobility; develop plan if impaired  - Assess patient's need for assistive devices and provide as appropriate  - Encourage maximum independence but intervene and supervise when necessary  - Involve family in performance of ADLs  - Assess for home care needs following discharge   - Consider OT consult to assist with ADL evaluation and planning for discharge  - Provide patient education as appropriate  Outcome: Progressing  Goal: Maintains/Returns to pre admission functional level  Description: INTERVENTIONS:  - Perform BMAT or MOVE assessment daily    - Set and communicate daily mobility goal to care team and patient/family/caregiver  - Collaborate with rehabilitation services on mobility goals if consulted  - Perform Range of Motion 3 times a day  - Reposition patient every 2 hours    - Dangle patient 3 times a day  - Stand patient 3 times a day  - Ambulate patient 3 times a day  - Out of bed to chair 3 times a day   - Out of bed for meals 3 times a day  - Out of bed for toileting  - Record patient progress and toleration of activity level   Outcome: Progressing     Problem: DISCHARGE PLANNING  Goal: Discharge to home or other facility with appropriate resources  Description: INTERVENTIONS:  - Identify barriers to discharge w/patient and caregiver  - Arrange for needed discharge resources and transportation as appropriate  - Identify discharge learning needs (meds, wound care, etc )  - Arrange for interpretive services to assist at discharge as needed  - Refer to Case Management Department for coordinating discharge planning if the patient needs post-hospital services based on physician/advanced practitioner order or complex needs related to functional status, cognitive ability, or social support system  Outcome: Progressing     Problem: Knowledge Deficit  Goal: Patient/family/caregiver demonstrates understanding of disease process, treatment plan, medications, and discharge instructions  Description: Complete learning assessment and assess knowledge base    Interventions:  - Provide teaching at level of understanding  - Provide teaching via preferred learning methods  Outcome: Progressing     Problem: Prexisting or High Potential for Compromised Skin Integrity  Goal: Skin integrity is maintained or improved  Description: INTERVENTIONS:  - Identify patients at risk for skin breakdown  - Assess and monitor skin integrity  - Assess and monitor nutrition and hydration status  - Monitor labs   - Assess for incontinence   - Turn and reposition patient  - Assist with mobility/ambulation  - Relieve pressure over bony prominences  - Avoid friction and shearing  - Provide appropriate hygiene as needed including keeping skin clean and dry  - Evaluate need for skin moisturizer/barrier cream  - Collaborate with interdisciplinary team   - Patient/family teaching  - Consider wound care consult   Outcome: Progressing

## 2023-04-13 NOTE — PLAN OF CARE
Problem: Potential for Falls  Goal: Patient will remain free of falls  Description: INTERVENTIONS:  - Educate patient/family on patient safety including physical limitations  - Instruct patient to call for assistance with activity   - Consult OT/PT to assist with strengthening/mobility   - Keep Call bell within reach  - Keep bed low and locked with side rails adjusted as appropriate  - Keep care items and personal belongings within reach  - Initiate and maintain comfort rounds  - Make Fall Risk Sign visible to staff  - Offer Toileting every  Hours, in advance of need  - Initiate/Maintain alarm  - Obtain necessary fall risk management equipment:   - Apply yellow socks and bracelet for high fall risk patients  - Consider moving patient to room near nurses station  Outcome: Progressing     Problem: MOBILITY - ADULT  Goal: Maintain or return to baseline ADL function  Description: INTERVENTIONS:  -  Assess patient's ability to carry out ADLs; assess patient's baseline for ADL function and identify physical deficits which impact ability to perform ADLs (bathing, care of mouth/teeth, toileting, grooming, dressing, etc )  - Assess/evaluate cause of self-care deficits   - Assess range of motion  - Assess patient's mobility; develop plan if impaired  - Assess patient's need for assistive devices and provide as appropriate  - Encourage maximum independence but intervene and supervise when necessary  - Involve family in performance of ADLs  - Assess for home care needs following discharge   - Consider OT consult to assist with ADL evaluation and planning for discharge  - Provide patient education as appropriate  Outcome: Progressing  Goal: Maintains/Returns to pre admission functional level  Description: INTERVENTIONS:  - Perform BMAT or MOVE assessment daily    - Set and communicate daily mobility goal to care team and patient/family/caregiver     - Collaborate with rehabilitation services on mobility goals if consulted  - Perform Range of Motion  times a day  - Reposition patient every  hours    - Dangle patient  times a day  - Stand patient  times a day  - Ambulate patient  times a day  - Out of bed to chair  times a day   - Out of bed for meals  times a day  - Out of bed for toileting  - Record patient progress and toleration of activity level   Outcome: Progressing     Problem: PAIN - ADULT  Goal: Verbalizes/displays adequate comfort level or baseline comfort level  Description: Interventions:  - Encourage patient to monitor pain and request assistance  - Assess pain using appropriate pain scale  - Administer analgesics based on type and severity of pain and evaluate response  - Implement non-pharmacological measures as appropriate and evaluate response  - Consider cultural and social influences on pain and pain management  - Notify physician/advanced practitioner if interventions unsuccessful or patient reports new pain  Outcome: Progressing     Problem: INFECTION - ADULT  Goal: Absence or prevention of progression during hospitalization  Description: INTERVENTIONS:  - Assess and monitor for signs and symptoms of infection  - Monitor lab/diagnostic results  - Monitor all insertion sites, i e  indwelling lines, tubes, and drains  - Monitor endotracheal if appropriate and nasal secretions for changes in amount and color  - [place] appropriate cooling/warming therapies per order  - Administer medications as ordered  - Instruct and encourage patient and family to use good hand hygiene technique  - Identify and instruct in appropriate isolation precautions for identified infection/condition  Outcome: Progressing  Goal: Absence of fever/infection during neutropenic period  Description: INTERVENTIONS:  - Monitor WBC    Outcome: Progressing     Problem: SAFETY ADULT  Goal: Patient will remain free of falls  Description: INTERVENTIONS:  - Educate patient/family on patient safety including physical limitations  - Instruct patient to call for assistance with activity   - Consult OT/PT to assist with strengthening/mobility   - Keep Call bell within reach  - Keep bed low and locked with side rails adjusted as appropriate  - Keep care items and personal belongings within reach  - Initiate and maintain comfort rounds  - Make Fall Risk Sign visible to staff  - Offer Toileting every  Hours, in advance of need  - Initiate/Maintain alarm  - Obtain necessary fall risk management equipment:   - Apply yellow socks and bracelet for high fall risk patients  - Consider moving patient to room near nurses station  Outcome: Progressing  Goal: Maintain or return to baseline ADL function  Description: INTERVENTIONS:  -  Assess patient's ability to carry out ADLs; assess patient's baseline for ADL function and identify physical deficits which impact ability to perform ADLs (bathing, care of mouth/teeth, toileting, grooming, dressing, etc )  - Assess/evaluate cause of self-care deficits   - Assess range of motion  - Assess patient's mobility; develop plan if impaired  - Assess patient's need for assistive devices and provide as appropriate  - Encourage maximum independence but intervene and supervise when necessary  - Involve family in performance of ADLs  - Assess for home care needs following discharge   - Consider OT consult to assist with ADL evaluation and planning for discharge  - Provide patient education as appropriate  Outcome: Progressing  Goal: Maintains/Returns to pre admission functional level  Description: INTERVENTIONS:  - Perform BMAT or MOVE assessment daily    - Set and communicate daily mobility goal to care team and patient/family/caregiver  - Collaborate with rehabilitation services on mobility goals if consulted  - Perform Range of Motion  times a day  - Reposition patient every  hours    - Dangle patient  times a day  - Stand patient  times a day  - Ambulate patient  times a day  - Out of bed to chair  times a day   - Out of bed for meal times a day  - Out of bed for toileting  - Record patient progress and toleration of activity level   Outcome: Progressing     Problem: DISCHARGE PLANNING  Goal: Discharge to home or other facility with appropriate resources  Description: INTERVENTIONS:  - Identify barriers to discharge w/patient and caregiver  - Arrange for needed discharge resources and transportation as appropriate  - Identify discharge learning needs (meds, wound care, etc )  - Arrange for interpretive services to assist at discharge as needed  - Refer to Case Management Department for coordinating discharge planning if the patient needs post-hospital services based on physician/advanced practitioner order or complex needs related to functional status, cognitive ability, or social support system  Outcome: Progressing     Problem: Knowledge Deficit  Goal: Patient/family/caregiver demonstrates understanding of disease process, treatment plan, medications, and discharge instructions  Description: Complete learning assessment and assess knowledge base    Interventions:  - Provide teaching at level of understanding  - Provide teaching via preferred learning methods  Outcome: Progressing     Problem: Prexisting or High Potential for Compromised Skin Integrity  Goal: Skin integrity is maintained or improved  Description: INTERVENTIONS:  - Identify patients at risk for skin breakdown  - Assess and monitor skin integrity  - Assess and monitor nutrition and hydration status  - Monitor labs   - Assess for incontinence   - Turn and reposition patient  - Assist with mobility/ambulation  - Relieve pressure over bony prominences  - Avoid friction and shearing  - Provide appropriate hygiene as needed including keeping skin clean and dry  - Evaluate need for skin moisturizer/barrier cream  - Collaborate with interdisciplinary team   - Patient/family teaching  - Consider wound care consult   Outcome: Progressing

## 2023-04-13 NOTE — ASSESSMENT & PLAN NOTE
Lab Results   Component Value Date    HGBA1C 8 7 05/10/2021     Recent Labs     04/12/23  1612 04/12/23  2111 04/13/23  0814 04/13/23  1133   POCGLU 124 151* 203* 166*   · Hold oral hypoglycemics and Trulicity  · Monitor Accu-Cheks, sliding scale for coverage

## 2023-04-13 NOTE — OCCUPATIONAL THERAPY NOTE
Occupational Therapy Evaluation and Treatment     Patient Name: Bill Decker  LKGRV'S Date: 4/13/2023  Problem List  Principal Problem:    Confusion  Active Problems:    Depression    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HCC)    Hypertension    Multiple sclerosis ([facility] [place] [address] )    Neurogenic bladder    Diabetes mellitus ([facility] [place] [address] )    History of CVA (cerebrovascular accident)    Acute metabolic encephalopathy    Hyponatremia    Past Medical History  Past Medical History:   Diagnosis Date    Acute laryngitis     Acute nonsuppurative otitis media, unspecified laterality     Arm weakness     Arthritis     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HCC)     Bladder infection     Bronchitis     Constipation     Cough     Diabetes mellitus (HCC)     Dizziness     Dysfunction of eustachian tube     Erectile dysfunction of non-organic origin     Fatigue     Glaucoma     Hiatal hernia     Hypertension     Imbalance     Leg muscle spasm     MS (multiple sclerosis) ([facility])     Nephrolithiasis     Neurogenic bladder     No natural teeth     Sinus pain     Spinal stenosis     Strain of thoracic region     Stroke [facility])     Suprapubic catheter ([address] )      Past Surgical History  Past Surgical History:   Procedure Laterality Date    APPENDECTOMY      BRAIN SURGERY      Coil placed in aneurysm    CYSTOSCOPY      CYSTOSCOPY      CYSTOSCOPY  06/11/2018    CYSTOSCOPY  01/15/2021    EYE SURGERY      transscleral cyclophotocoagulation noncontact YAG laser    MYRINGOTOMY      with ventilation tube insertion    KY LITHOLAPAXY SMPL/SM <2 5 CM N/A 5/7/2019    Procedure: CYSTOSCOPY, holmium laser litholapaxy of bladder stones, EXCHANGE OF SP TUBE;  Surgeon: Blanca Castro MD;  Location: [facility];  Service: Urology    SUPRAPUBIC CATHETER INSERTION             04/13/23 0930   OT Last Visit   OT Visit Date 04/13/23   Note Type   Note type Evaluation  (and treatment)   Pain Assessment   Pain Assessment Tool 0-10   Pain Score 7   Pain Location/Orientation Orientation: Left;Location: Hip   Hospital Pain Intervention(s) Repositioned; Ambulation/increased activity; Emotional support; Rest   Multiple Pain Sites No   Restrictions/Precautions   Weight Bearing Precautions Per Order No   Other Precautions Chair Alarm; Bed Alarm;Contact/isolation;Cognitive; Fall Risk;Pain   Home Living   Type of [facility] [address] One level;Ramped entrance;Stairs to enter without rails  (Ramp +1 DAI)   Bathroom Shower/Tub Walk-in shower   Bathroom Toilet Standard   Bathroom Equipment Grab bars in shower; Shower chair;Commode;Grab bars around toilet   Bathroom Accessibility Accessible via wheelchair   [address] Walker;Cane;Wheelchair-manual;Hospital bed   Additional Comments Pt lives with brother and sister in a one level house with ramp +1 step to enter  Pt reports attending [employer] 1-3x/wk for a few hours per day  (-) home alone  Prior Function   Level of [place] Needs assistance with ADLs; Needs assistance with IADLS;Modified independent with wheelchair   Lives With Family  (brother and sister)   Wiliam Alejandro Help From Family; Other (Comment)  (goes to [employer] 1-3x/wk per needed)   IADLs Family/Friend/Other provides transportation; Family/Friend/Other provides meals; Family/Friend/Other provides medication management   Falls in the last 6 months 0   Vocational Retired   Comments At baseline, pt reports requiring assist w/ ADLs and IADLs  Pt reports Mod I for SPTs to/from W/C and Mod I for W/C mobility  Pt non-ambulatory at baseline  (-)   Denies falls PTA  Lifestyle   Autonomy At baseline, pt reports requiring assist w/ ADLs and IADLs  Pt reports Mod I for SPTs to/from W/C and Mod I for W/C mobility  Pt non-ambulatory at baseline  (-)   Denies falls PTA     Reciprocal Relationships Supportive family   Service to Others Retired- Machine shop   Intrinsic Gratification Watching TV   ADL   Where Assessed Chair   Eating Assistance 7  3 [facility] [place] 4  Minimal Assistance   UB Bathing Assistance 4  Minimal Assistance   LB Bathing Assistance 2  Maximal Assistance   UB Dressing Assistance 4  Minimal Assistance   LB Dressing Assistance 2  Maximal Assistance   Toileting Assistance  2  Maximal Assistance   Functional Assistance 2  Maximal Assistance   Bed Mobility   Supine to Sit 2  Maximal assistance   Additional items Assist x 2;HOB elevated; Bedrails; Increased time required;Verbal cues;LE management   Sit to Supine Unable to assess   Additional Comments Pt seated OOB in chair with chair alarm activated at end of session  Call bell and phone within reach  All needs met and pt reports no further questions for OT at this time  Transfers   Sit to Stand 2  Maximal assistance   Additional items Assist x 2;Bedrails; Increased time required;Verbal cues   Stand to Sit 2  Maximal assistance   Additional items Assist x 2; Increased time required;Verbal cues   Functional Mobility   Additional Comments Pt unable to take steps during evaluation, reports non-ambulatory at baseline   Balance   Static Sitting   (Poor+ to Fair- at times with cues for upright seated positioning and use of B/L bedrails for support)   Dynamic Sitting Poor +   Static Standing Poor   Dynamic Standing Poor -   Ambulatory Zero   Activity Tolerance   Activity Tolerance Patient limited by fatigue;Treatment limited secondary to medical complications (Comment)   Medical Staff Made Aware [facility] PT; Maribel Martinez, PT student   Nurse Made Aware Luc Lora, GYPSY   RUE Assessment   RUE Assessment WFL  (4/5 throughout)   LUE Assessment   LUE Assessment WFL  (4/5 throughout)   Hand Function   Gross Motor Coordination Functional   Fine Motor Coordination Functional   Sensation   Light Touch No apparent deficits   Proprioception   Proprioception No apparent deficits   Vision-Basic Assessment   Current Vision Wears glasses only for reading   Vision - Complex Assessment   Ocular Range of Motion Intact   Acuity Able to read clock/calendar on wall "without difficulty; Able to read employee name badge without difficulty   Psychosocial   Psychosocial (WDL) WDL   Perception   Inattention/Neglect Appears intact   Cognition   Overall Cognitive Status Impaired   Arousal/Participation Alert; Cooperative   Attention Attends with cues to redirect   Orientation Level Oriented to person;Oriented to place  (general to time- \"May 2023\")   Memory Decreased recall of recent events;Decreased recall of precautions   Following Commands Follows one step commands without difficulty   Comments Pleasant and cooperative  Inconsistent report of PLOF and recent events  Limited insight into deficits   Assessment   Limitation Decreased ADL status; Decreased UE strength;Decreased Safe judgement during ADL;Decreased cognition;Decreased endurance;Decreased self-care trans;Decreased high-level ADLs   Prognosis Fair   Assessment Pt is a 67 y o  male seen for OT evaluation s/p adm to [address] on 4/10/2023 w/ Confusion, Hyponatremia, Acute metabolic encephalopathy  CTA head/neck: negative for any acute infarct, hemorrhage, or mass effect  Comorbidities affecting pt’s functional performance include a significant PMH of Arthritis, DM, Glaucoma, HTN, MS, Neurogenic bladder, Spinal stenosis, and Stroke  Pt with active OT orders and activity orders for Up and OOB as tolerated   Pt lives with brother and sister in a one level house with ramp +1 step to enter  Pt reports attending [employer] 1-3x/wk for a few hours per day  (-) home alone  At baseline, pt reports requiring assist w/ ADLs and IADLs  Pt reports Mod I for SPTs to/from W/C and Mod I for W/C mobility  Pt non-ambulatory at baseline  (-)   Denies falls PTA   Upon evaluation, pt currently requires Min A for UB ADLs, Max A for LB ADLs, Max A for toileting, Max A of 2 for bed mobility, and Max-Mod A of 2 for functional transfers 2* the following deficits impacting occupational performance: limited functional reach, decreased " strength, decreased balance, decreased tolerance, impaired memory and decreased safety awareness  These impairments, as well at pt’s fall risk, difficulty performing transfers, steps to enter environment, difficulty performing ADLS and limited insight into deficits limit pt’s ability to safely engage in all baseline areas of occupation  Based on the aforementioned OT evaluation, functional performance deficits, and assessments, pt has been identified as a High complexity evaluation  Pt to continue to benefit from continued acute OT services during hospital stay to address defined deficits and to maximize level of functional independence in the following Occupational Performance areas: grooming, bathing/shower, toilet hygiene, dressing, medication management, health maintenance, functional mobility, community mobility, clothing management and social participation  From OT standpoint, recommend STR upon D/C  OT will continue to follow pt 3-5x/wk to address the following goals to  w/in 10-14 days:   Goals   Patient Goals To go home   LTG Time Frame 10-14   Long Term Goal Please refer to LTGs listed below   Plan   Treatment Interventions ADL retraining;Functional transfer training;UE strengthening/ROM; Endurance training;Patient/family training;Equipment evaluation/education; Compensatory technique education;Continued evaluation; Energy conservation; Activityengagement   Goal Expiration Date 23   OT Treatment Day 1   OT Frequency 3-5x/wk   Recommendation   OT Discharge Recommendation Post acute rehabilitation services   Additional Comments  The patient's raw score on the AM-PAC Daily Activity Inpatient Short Form is 16  A raw score of less than 19 suggests the patient may benefit from discharge to post-acute rehabilitation services  Please refer to the recommendation of the Occupational Therapist for safe discharge planning     AM-PAC Daily Activity Inpatient   Lower Body Dressing 2   Bathing 2   Toileting 2 "  Upper Body Dressing 3   Grooming 3   Eating 4   Daily Activity Raw Score 16   Daily Activity Standardized Score (Calc for Raw Score >=11) 35 96   AM-PAC Applied Cognition Inpatient   Following a Speech/Presentation 4   Understanding Ordinary Conversation 4   Taking Medications 2   Remembering Where Things Are Placed or Put Away 3   Remembering List of 4-5 Errands 3   Taking Care of Complicated Tasks 2   Applied Cognition Raw Score 18   Applied Cognition Standardized Score 38 07   Additional Treatment Session   Start Time 0915   End Time 0930   Treatment Assessment Pt seen for additional OT treatment session focusing on functional activity tolerance, ADLs, and OOB trials  Pt seated EOB after completion of initial OT eval  On initial eval, pt able to complete sit<>stand transfers w/ Max A of 2 w/ use of RW  Pt unable to take any steps or complete SPT  Quick Move device utilized for OOB transfer  Pt progressed to Mod A of 2 for transfers w/ use of Quick Move w/ bed height elevated from EOB  Continue to recommend Quick Move for OOB transfers at this time  Pt reports feeling \"funny\" upon getting to chair, denies dizziness  BP: 140/71  Pt reports symptoms resolving w/ seated rest break  Total A required for LB dressing to don/doff B/L shoes 2* limited functional reach demonstrated  Pt reports family assists w/ LB ADLs at baseline  Pt seated OOB in chair with chair alarm activated at end of session  Call bell and phone within reach  All needs met and pt reports no further questions for OT at this time     Additional Treatment Day 1       GOALS    Pt will improve activity tolerance to G for min 30 min txment sessions for increase engagement in functional tasks    Pt will complete bed mobility at a Min A level w/ G balance/safety demonstrated to decrease caregiver assistance required     Pt will complete UB dressing/self care w/ Supervision using adaptive device and DME as needed     Pt will complete LB dressing/self care " w/ min A using adaptive device and DME as needed    Pt will complete toileting w/ min A w/ G hygiene/thoroughness using DME as needed    Pt will improve functional transfers to Min A on/off all surfaces using DME as needed w/ G balance/safety     Pt will tolerate continued functional mobility assessment and appropriate goals will be established by OTR as applicable     Pt will be attentive 100% of the time during ongoing cognitive assessment w/ G participation to assist w/ safe d/c planning/recommendations    Pt will demonstrate G carryover of pt/caregiver education and training as appropriate w/o cues w/ good tolerance to increase safety during functional tasks    Pt will increase BUE strength by 1MM grade via AROM exercises to increase independence in ADLs and transfers    Pt will verbalize 3 potential fall hazards and identify appropriate compensatory techniques to decrease fall risk in home environment     Pt will increase standing tolerance to 2-3 mins with Poor+ dynamic standing balance to increase safety during participation in ADLs     Pt will demonstrate ability to perform pressure relief techniques (ie: weight shifts, frequent changes in position, placement of positioning devices- pillows, wedges, etc) at a Min A level after education from therapist       Pippa Hutchinson OTR/L

## 2023-04-13 NOTE — PLAN OF CARE
Problem: PHYSICAL THERAPY ADULT  Goal: Performs mobility at highest level of function for planned discharge setting  See evaluation for individualized goals  Description: Treatment/Interventions: Functional transfer training, LE strengthening/ROM, Therapeutic exercise, Endurance training, Patient/family training, Bed mobility, Spoke to nursing, OT          See flowsheet documentation for full assessment, interventions and recommendations  4/13/2023 1459 by Felisa Baugh  Note: Prognosis: Fair  Problem List: Decreased strength, Decreased range of motion, Decreased endurance, Impaired balance, Decreased mobility, Decreased coordination, Impaired tone, Pain, Decreased safety awareness  Assessment: Pt 67 y o  male w/ known MS, hx stroke, DM2, and neurogenic bladder w/ suprapubic catheter presented to [facility] on 4/10/2023 w/ increased confusion  Pt admitted for acute encephalopathy, and UTI  PT eval and tx order w/ up and OOB as tolerated placed  PTA, pt was independent w/ transfers and Mod I w/ manual w/c, has ramp + 1  DAI and lives w/ brother and sister in 1 level home  Upon evaluation, pt exhibits weakness, impaired balance, pain, decreased activity tolerance, fall risk and impaired tone as noted in flow sheet  Pt demonstrated bed mobility w/ Max A x 2 and transfers requiring verbal cuing for proper mechanics and safety  Pt was unable to take lateral steps despite Max A x 2 + RW  No gross complaints of dizziness, lightheadedness or SOB  Pt tolerated further mobility training after IE  Please see additional tx below for details  The above mentioned impairments limit pt's ability for independent functional mobility hence will benefit from skilled PT during hospital stay to improve function  The patient's AM-PAC Basic Mobility Inpatient Short Form Raw Score is 6   A Raw score of less than or equal to 16 suggests the patient may benefit from discharge to post-acute rehabilitation services   Please also refer to the recommendation of the Physical Therapist for safe discharge planning  PT will recommend post acute rehabilitation services upon d/c from acute care once medically managed to improve functional mobility to baseline  Education provided to pt regarding importance of PT  Continue to encourage mobilization w/ nursing as tolerated to prevent further functional decline  Marcos Duke for [facility]  Nursing notified  Pt tolerated session well  Pt at end of session, in stable condition, seated in bedside chair with all needs within reach  Co-eval with OT necessary for pt's best interest and medical complexity  Please see associated tx note for further details  PT Discharge Recommendation: Post acute rehabilitation services    See flowsheet documentation for full assessment  4/13/2023 1459 by Rand Hancock  Note: Prognosis: Fair  Problem List: Decreased strength, Decreased range of motion, Decreased endurance, Impaired balance, Decreased mobility, Decreased coordination, Impaired tone, Pain, Decreased safety awareness  Assessment: Pt 67 y o  male w/ known MS, hx stroke, DM2, and neurogenic bladder w/ suprapubic catheter presented to [facility] on 4/10/2023 w/ increased confusion  Pt admitted for acute encephalopathy, and UTI  PT eval and tx order w/ up and OOB as tolerated placed  PTA, pt was independent w/ transfers and Mod I w/ manual w/c, has ramp + 1  DAI and lives w/ brother and sister in 1 level home  Upon evaluation, pt exhibits weakness, impaired balance, pain, decreased activity tolerance, fall risk and impaired tone as noted in flow sheet  Pt demonstrated bed mobility w/ Max A x 2 and transfers requiring verbal cuing for proper mechanics and safety  Pt was unable to take lateral steps despite Max A x 2 + RW  No gross complaints of dizziness, lightheadedness or SOB  Pt tolerated further mobility training after IE  Please see additional tx below for details    The above mentioned impairments limit pt's ability for independent functional mobility hence will benefit from skilled PT during hospital stay to improve function  The patient's AM-PAC Basic Mobility Inpatient Short Form Raw Score is 6   A Raw score of less than or equal to 16 suggests the patient may benefit from discharge to post-acute rehabilitation services  Please also refer to the recommendation of the Physical Therapist for safe discharge planning  PT will recommend post acute rehabilitation services upon d/c from acute care once medically managed to improve functional mobility to baseline  Education provided to pt regarding importance of PT  Continue to encourage mobilization w/ nursing as tolerated to prevent further functional decline  Ari Galindo for [address] [place] [address]  Nursing notified  Pt tolerated session well  Pt at end of session, in stable condition, seated in bedside chair with all needs within reach  Co-eval with OT necessary for pt's best interest and medical complexity  Please see associated tx note for further details  PT Discharge Recommendation: Post acute rehabilitation services    See flowsheet documentation for full assessment

## 2023-04-14 PROBLEM — I50.32 CHRONIC DIASTOLIC CONGESTIVE HEART FAILURE (HCC): Status: ACTIVE | Noted: 2023-04-14

## 2023-04-14 LAB
ANION GAP SERPL CALCULATED.3IONS-SCNC: 8 MMOL/L (ref 4–13)
BUN SERPL-MCNC: 6 MG/DL (ref 5–25)
CALCIUM SERPL-MCNC: 9.3 MG/DL (ref 8.4–10.2)
CHLORIDE SERPL-SCNC: 103 MMOL/L (ref 96–108)
CO2 SERPL-SCNC: 23 MMOL/L (ref 21–32)
CREAT SERPL-MCNC: 0.77 MG/DL (ref 0.6–1.3)
GFR SERPL CREATININE-BSD FRML MDRD: 90 ML/MIN/1.73SQ M
GLUCOSE SERPL-MCNC: 119 MG/DL (ref 65–140)
GLUCOSE SERPL-MCNC: 121 MG/DL (ref 65–140)
GLUCOSE SERPL-MCNC: 161 MG/DL (ref 65–140)
GLUCOSE SERPL-MCNC: 181 MG/DL (ref 65–140)
GLUCOSE SERPL-MCNC: 186 MG/DL (ref 65–140)
POTASSIUM SERPL-SCNC: 3.6 MMOL/L (ref 3.5–5.3)
SODIUM SERPL-SCNC: 134 MMOL/L (ref 135–147)

## 2023-04-14 RX ADMIN — GABAPENTIN 300 MG: 300 CAPSULE ORAL at 17:32

## 2023-04-14 RX ADMIN — PROPRANOLOL HYDROCHLORIDE 60 MG: 60 CAPSULE, EXTENDED RELEASE ORAL at 09:39

## 2023-04-14 RX ADMIN — AMLODIPINE BESYLATE 10 MG: 10 TABLET ORAL at 17:32

## 2023-04-14 RX ADMIN — CLOPIDOGREL BISULFATE 75 MG: 75 TABLET ORAL at 09:39

## 2023-04-14 RX ADMIN — GABAPENTIN 600 MG: 300 CAPSULE ORAL at 22:05

## 2023-04-14 RX ADMIN — SERTRALINE HYDROCHLORIDE 25 MG: 25 TABLET ORAL at 22:04

## 2023-04-14 RX ADMIN — LATANOPROST 1 DROP: 50 SOLUTION OPHTHALMIC at 22:04

## 2023-04-14 RX ADMIN — INSULIN LISPRO 1 UNITS: 100 INJECTION, SOLUTION INTRAVENOUS; SUBCUTANEOUS at 12:29

## 2023-04-14 RX ADMIN — GABAPENTIN 300 MG: 300 CAPSULE ORAL at 09:39

## 2023-04-14 RX ADMIN — INSULIN LISPRO 1 UNITS: 100 INJECTION, SOLUTION INTRAVENOUS; SUBCUTANEOUS at 17:32

## 2023-04-14 RX ADMIN — ATORVASTATIN CALCIUM 80 MG: 80 TABLET, FILM COATED ORAL at 17:32

## 2023-04-14 RX ADMIN — SENNOSIDES AND DOCUSATE SODIUM 2 TABLET: 8.6; 5 TABLET ORAL at 22:04

## 2023-04-14 NOTE — PROGRESS NOTES
[address] [place] [address]  Progress Note  Name: Carmella Greenwood  MRN: [id number]  Unit/Bed#: E4 -01 I Date of Admission: 4/10/2023   Date of Service: 4/14/2023 I Hospital Day: 4    Assessment/Plan   * Confusion  Assessment & Plan  This is a 58-year-old male with history of longstanding multiple sclerosis, wheelchair-bound, hypertension, hyperlipidemia, neurogenic bladder with chronic suprapubic catheter in place senting with confusion  Lives at home with his brother and sister  Has had nausea and poor oral intake at home       · Initially thought to have UTI given UA with: moderate leukocytes, positive nitrite, moderate blood, innumerable WBC and bacteria  · He has history of E  coli, allergic to cephalexin, was started on Levaquin  · Blood culture negative at 72 hours  · Consider other etiology being hyponatremia in the setting of dehydration and xanax use  · However with suspicion this is a true UTI given confusion, tube discomfort, leukocytosis, and elevated procal- WBC appearing to improve on IV antibiotics   · Decreased Xanax  · Completed 3-day IV antibiotic course  · Mental status appears to be improving  · PT OT now recommending short-term rehab given deconditioned status- discussed with brother via telephone and patient    Hyponatremia  Assessment & Plan  Results from last 7 days   Lab Units 04/14/23  0511 04/13/23  0504 04/12/23  0526 04/11/23  1404   SODIUM mmol/L 134* 129* 130* 128*   · Presented with low NA of 126  · Suspecting secondary to dehydration   · Started on IV fluids which improved sodium slowly  · Lasix currently on hold - urine sodium studies skewed since pt was on lasix prior to admission  · Discontinued IV fluids 4/13 given nearing euvolemic status  · Expect to begin Lasix in the next day  · Patient now eating and drinking regularly  · Recheck sodium much improved  · Will likely need reduction of home lasix    Acute metabolic encephalopathy  Assessment & Plan  · Metabolic encephalopathy likely secondary to UTI vs xanax vs dehydration vs hyponatremia  · Continue plan above  · Mental status improved    History of CVA (cerebrovascular accident)  Assessment & Plan  · History of CVA in October 2018  · CTA head and neck negative for any acute infarct, hemorrhage or mass effect, focal moderate to severe stenosis in P3 segment of right posterior cerebral artery  · Continue plavix and lipitor    Diabetes mellitus [facility])  Assessment & Plan  Lab Results   Component Value Date    HGBA1C 8 7 05/10/2021     Recent Labs     04/13/23  1133 04/13/23  1548 04/13/23  2045 04/14/23  0710   POCGLU 166* 121 160* 121   · Hold oral hypoglycemics and Trulicity  · Monitor Accu-Cheks, sliding scale for coverage    Neurogenic bladder  Assessment & Plan  · Chronic suprapubic catheter in place    Multiple sclerosis ([facility] [address] )  Assessment & Plan  · Patient with history of longstanding multiple sclerosis  · Chronic lower extremity weakness, wheelchair-bound  · On gabapentin 300 mg twice daily and 600 mg at bedtime    Hypertension  Assessment & Plan  · Home regimen propranolol 60 mg daily, amlodipine 10 mg daily  · BP stable here    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HCC)  Assessment & Plan  · 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  · CTA head and neck stable    Depression  Assessment & Plan  · Continue home sertraline  · Alprazolam 0 25 mg bid prn  · Decreased alprazolam to 0 25 mg daily prn        VTE Pharmacologic Prophylaxis:   Pharmacologic: Enoxaparin (Lovenox)  Mechanical VTE Prophylaxis in Place: Yes    Discharge Plan: With need for continued inpatient stay for short-term rehab    Discussions with Specialists or Other Care Team Provider: Patient, nursing, case management    Education and Discussions with Family / Patient: Patient, brother via telephone-updated    Time Spent for Care: 30 minutes    More than 50% of total time spent on counseling and coordination of care as described above  Current Length of Stay: 4 day(s)  Current Patient Status: Inpatient   Code Status: Level 1 - Full Code    Subjective:   Resting in bed  He is eating and drinking regularly  Short-term rehab was discussed with him  Patient is hesitant however open to exploring this option  Spoke with his brother via telephone  He will also converse with Calderon Mendieta in regards to a facility that would be close by       Objective:     Vitals:   Temp (24hrs), Av 6 °F (36 4 °C), Min:97 4 °F (36 3 °C), Max:97 8 °F (36 6 °C)    Temp:  [97 4 °F (36 3 °C)-97 8 °F (36 6 °C)] 97 4 °F (36 3 °C)  HR:  [78-81] 80  Resp:  [17-18] 17  BP: (126-153)/(59-68) 126/66  SpO2:  [95 %-96 %] 95 %  There is no height or weight on file to calculate BMI  Input and Output Summary (last 24 hours): Intake/Output Summary (Last 24 hours) at 2023 1108  Last data filed at 2023 0500  Gross per 24 hour   Intake 480 ml   Output 2450 ml   Net -1970 ml       Physical Exam:     Physical Exam  Vitals and nursing note reviewed  Constitutional:       General: He is not in acute distress  Appearance: Normal appearance  He is normal weight  He is not ill-appearing, toxic-appearing or diaphoretic  HENT:      Head: Normocephalic and atraumatic  Cardiovascular:      Rate and Rhythm: Normal rate and regular rhythm  Pulmonary:      Effort: Pulmonary effort is normal  No respiratory distress  Breath sounds: Normal breath sounds  No stridor  No wheezing or rhonchi  Abdominal:      General: Bowel sounds are normal  There is no distension  Palpations: Abdomen is soft  There is no mass  Tenderness: There is no abdominal tenderness  Hernia: No hernia is present  Comments: Suprapubic catheter in place   Neurological:      Mental Status: He is alert and oriented to person, place, and time  Mental status is at baseline     Psychiatric:         Mood and Affect: Mood normal  Behavior: Behavior normal       Comments: Forgetful at times         Additional Data:     Labs:    Results from last 7 days   Lab Units 04/12/23  0526 04/11/23  0204   WBC Thousand/uL 7 90 11 53*   HEMOGLOBIN g/dL 13 4 14 4   HEMATOCRIT % 38 8 41 6   PLATELETS Thousands/uL 273 271   NEUTROS PCT %  --  78*   LYMPHS PCT %  --  13*   MONOS PCT %  --  7   EOS PCT %  --  1     Results from last 7 days   Lab Units 04/14/23  0511 04/13/23  0504 04/12/23  0526   POTASSIUM mmol/L 3 6   < > 3 4*   CHLORIDE mmol/L 103   < > 99   CO2 mmol/L 23   < > 21   BUN mg/dL 6   < > 6   CREATININE mg/dL 0 77   < > 0 76   CALCIUM mg/dL 9 3   < > 8 6   ALK PHOS U/L  --   --  72   ALT U/L  --   --  15   AST U/L  --   --  17    < > = values in this interval not displayed  * I Have Reviewed All Lab Data Listed Above  * Additional Pertinent Lab Tests Reviewed: [address] [address] Admission Reviewed    Imaging:    Imaging Reports Reviewed Today Include:   Imaging Personally Reviewed by Myself Includes:      Recent Cultures (last 7 days):     Results from last 7 days   Lab Units 04/10/23  1723 04/10/23  1712 04/10/23  1542   BLOOD CULTURE  No Growth at 72 hrs   No Growth at 72 hrs   --    URINE CULTURE   --   --  >100,000 cfu/ml Escherichia coli*  40,000-49,000 cfu/ml Enterococcus faecalis*       Last 24 Hours Medication List:   Current Facility-Administered Medications   Medication Dose Route Frequency Provider Last Rate   • acetaminophen  650 mg Oral Q6H PRN Mayo Maldonado MD     • albuterol  2 5 mg Nebulization Q6H PRN Mayo Maldonado MD     • ALPRAZolam  0 25 mg Oral Daily PRN Brittany Manning PA-C     • amLODIPine  10 mg Oral Daily With Cindy Eli MD      And   • atorvastatin  80 mg Oral Daily With Cindy Eli MD     • clopidogrel  75 mg Oral Daily Mayo Maldonado MD     • enoxaparin  40 mg Subcutaneous Daily Mayo Maldonado MD     • gabapentin  300 mg Oral BID Mayo Maldonado MD     • gabapentin  600 mg Oral HS Tanja Rodriguez MD     • insulin lispro  1-5 Units Subcutaneous TID AC Tanja Rodriguez MD     • latanoprost  1 drop Both Eyes HS Tanja Rodriguez MD     • melatonin  3 mg Oral HS PRN Tanja Rodriguez MD     • propranolol  60 mg Oral Daily Tanja Rodriguez MD     • senna-docusate sodium  2 tablet Oral HS Tanja Rodriguez MD     • sertraline  25 mg Oral HS Tanja Rodriguez MD          Today, Patient Was Seen By: Олег Wilson PA-C    ** Please Note: This note has been constructed using a voice recognition system   **

## 2023-04-14 NOTE — ASSESSMENT & PLAN NOTE
Lab Results   Component Value Date    HGBA1C 8 7 05/10/2021     Recent Labs     04/13/23  1133 04/13/23  1548 04/13/23 2045 04/14/23  0710   POCGLU 166* 121 160* 121   · Hold oral hypoglycemics and Trulicity  · Monitor Accu-Cheks, sliding scale for coverage

## 2023-04-14 NOTE — ASSESSMENT & PLAN NOTE
This is a 69-year-old male with history of longstanding multiple sclerosis, wheelchair-bound, hypertension, hyperlipidemia, neurogenic bladder with chronic suprapubic catheter in place senting with confusion  Lives at home with his brother and sister  Has had nausea and poor oral intake at home       · Initially thought to have UTI given UA with: moderate leukocytes, positive nitrite, moderate blood, innumerable WBC and bacteria  · He has history of E  coli, allergic to cephalexin, was started on Levaquin  · Blood culture negative at 72 hours  · Consider other etiology being hyponatremia in the setting of dehydration and xanax use  · However with suspicion this is a true UTI given confusion, tube discomfort, leukocytosis, and elevated procal- WBC appearing to improve on IV antibiotics   · Decreased Xanax  · Completed 3-day IV antibiotic course  · Mental status appears to be improving  · PT OT now recommending short-term rehab given deconditioned status- discussed with brother via telephone and patient

## 2023-04-14 NOTE — UTILIZATION REVIEW
Continued Stay Review    Date: 4/14                         Current Patient Class: IP Current Level of Care: MS    HPI:72 y o  male initially admitted on 4/10    Assessment/Plan:   Pt is able to eat and deink  Decreasing Xanax dosing PRN  BP stable  Glucose in good control  Considering post d/c rehab       Vital Signs:   04/14/23 1455 97 3 °F (36 3 °C) Abnormal  74 18 122/60 87 95 % None (Room air) Lying   04/14/23 0939 -- 80 -- 126/66 -- -- -- --   04/14/23 0800 -- -- -- -- -- -- None (Room air) --   04/14/23 0710 97 4 °F (36 3 °C) Abnormal  81 17 131/59 85 95 % None (Room air) Lying   04/13/23 2304 97 8 °F (36 6 °C) 80 18 129/63 86 95 % None (Room air) Lying   04/13/23 1735 -- -- -- 153/68 -- -- -- --   04/13/23 1513 97 5 °F (36 4 °C) 78 18 126/61 86 96 % None (Room air) Lying   04/13/23 0938 -- 93 -- 117/57 -- -- -- --   04/13/23 0730 98 2 °F (36 8 °C) 90 18 152/72 -- 98 % None (Room       Pertinent Labs/Diagnostic Results:       Results from last 7 days   Lab Units 04/12/23  0526 04/11/23  0204 04/10/23  2041 04/10/23  1423   WBC Thousand/uL 7 90 11 53*  --  16 83*   HEMOGLOBIN g/dL 13 4 14 4  --  15 7   HEMATOCRIT % 38 8 41 6  --  45 3   PLATELETS Thousands/uL 273 271 290 325   NEUTROS ABS Thousands/µL  --  8 97*  --  14 03*         Results from last 7 days   Lab Units 04/14/23  0511 04/13/23  0504 04/12/23  0526 04/11/23  1404 04/11/23  0808   SODIUM mmol/L 134* 129* 130* 128* 128*   POTASSIUM mmol/L 3 6 3 7 3 4* 3 8 3 6   CHLORIDE mmol/L 103 100 99 97 96   CO2 mmol/L 23 20* 21 22 22   ANION GAP mmol/L 8 9 10 9 10   BUN mg/dL 6 8 6 9 11   CREATININE mg/dL 0 77 0 70 0 76 0 82 0 82   EGFR ml/min/1 73sq m 90 93 90 87 87   CALCIUM mg/dL 9 3 8 8 8 6 8 8 9 2     Results from last 7 days   Lab Units 04/12/23  0526 04/11/23  0204 04/10/23  1423   AST U/L 17 13 13   ALT U/L 15 12 14   ALK PHOS U/L 72 80 106*   TOTAL PROTEIN g/dL 6 3* 7 3 7 9   ALBUMIN g/dL 3 1* 3 6 3 9   TOTAL BILIRUBIN mg/dL 0 57 0 88 1 20* Results from last 7 days   Lab Units 04/14/23  1607 04/14/23  1100 04/14/23  0710 04/13/23  2045 04/13/23  1548 04/13/23  1133 04/13/23  0814 04/12/23  2111 04/12/23  1612 04/12/23  1149 04/12/23  0739 04/11/23  2056   POC GLUCOSE mg/dl 161* 186* 121 160* 121 166* 203* 151* 124 146* 112 127     Results from last 7 days   Lab Units 04/14/23  0511 04/13/23  0504 04/12/23  0526 04/11/23  1404 04/11/23  0808 04/11/23  0204 04/10/23  2041 04/10/23  1423   GLUCOSE RANDOM mg/dL 119 131 110 164* 125 120  120 156* 189*     Results from last 7 days   Lab Units 04/10/23  2041   OSMOLALITY, SERUM mmol/*     Results from last 7 days   Lab Units 04/10/23  2041   TSH 3RD GENERATON uIU/mL 2 863     Results from last 7 days   Lab Units 04/12/23  0526 04/10/23  1712   PROCALCITONIN ng/ml 0 37* 0 47*     Results from last 7 days   Lab Units 04/10/23  1712   LACTIC ACID mmol/L 1 3     Results from last 7 days   Lab Units 04/10/23  2227 04/10/23  2041   OSMOLALITY, SERUM mmol/KG  --  278*   OSMO UR mmol/  --      Results from last 7 days   Lab Units 04/10/23  2023 04/10/23  1542   CLARITY UA   --  Cloudy   COLOR UA   --  Yellow   SPEC GRAV UA   --  1 025   PH UA   --  6 0   GLUCOSE UA mg/dl  --  250 (1/4%)*   KETONES UA mg/dl  --  40 (2+)*   BLOOD UA   --  Moderate*   PROTEIN UA mg/dl  --  30 (1+)*   NITRITE UA   --  Positive*   BILIRUBIN UA   --  Negative   UROBILINOGEN UA E U /dl  --  0 2   LEUKOCYTES UA   --  Moderate*   WBC UA /hpf  --  Innumerable*   RBC UA /hpf  --  10-20*   BACTERIA UA /hpf  --  Innumerable*   EPITHELIAL CELLS WET PREP /hpf  --  None Seen   SODIUM UR  21  --      Results from last 7 days   Lab Units 04/10/23  1723 04/10/23  1712 04/10/23  1542   BLOOD CULTURE  No Growth at 72 hrs   No Growth at 72 hrs   --    URINE CULTURE   --   --  >100,000 cfu/ml Escherichia coli*  40,000-49,000 cfu/ml Enterococcus faecalis*                 Medications:   Scheduled Medications:  amLODIPine, 10 mg, Oral, Daily With Dinner   And  atorvastatin, 80 mg, Oral, Daily With Dinner  clopidogrel, 75 mg, Oral, Daily  enoxaparin, 40 mg, Subcutaneous, Daily  gabapentin, 300 mg, Oral, BID  gabapentin, 600 mg, Oral, HS  insulin lispro, 1-5 Units, Subcutaneous, TID AC  latanoprost, 1 drop, Both Eyes, HS  propranolol, 60 mg, Oral, Daily  senna-docusate sodium, 2 tablet, Oral, HS  sertraline, 25 mg, Oral, HS      Continuous IV Infusions:     PRN Meds:  acetaminophen, 650 mg, Oral, Q6H PRN  albuterol, 2 5 mg, Nebulization, Q6H PRN  ALPRAZolam, 0 25 mg, Oral, Daily PRN  melatonin, 3 mg, Oral, HS PRN    Discharge Plan: D    Network Utilization Review Department  ATTENTION: Please call with any questions or concerns to [phone] and carefully listen to the prompts so that you are directed to the right person  All voicemails are confidential   Birda Manners all requests for admission clinical reviews, approved or denied determinations and any other requests to dedicated fax number below belonging to the campus where the patient is receiving treatment   List of dedicated fax numbers for the Facilities:  [address] DENIALS (Administrative/Medical Necessity) [phone]   [address] (Maternity/NICU/Pediatrics) [phone]   913 Sarah Juárez [phone]   [facility] [place] [address] [phone]   [address] [address] Lew Silva 28 [phone]   [facility] Erika Jacobo [facility] 134 [facility] [phone]

## 2023-04-14 NOTE — PLAN OF CARE
Problem: Potential for Falls  Goal: Patient will remain free of falls  Description: INTERVENTIONS:  - Educate patient/family on patient safety including physical limitations  - Instruct patient to call for assistance with activity   - Consult OT/PT to assist with strengthening/mobility   - Keep Call bell within reach  - Keep bed low and locked with side rails adjusted as appropriate  - Keep care items and personal belongings within reach  - Initiate and maintain comfort rounds  - Make Fall Risk Sign visible to staff  - Offer Toileting every  Hours, in advance of need  - Initiate/Maintain alarm  - Obtain necessary fall risk management equipment:   - Apply yellow socks and bracelet for high fall risk patients  - Consider moving patient to room near nurses station  Outcome: Progressing     Problem: MOBILITY - ADULT  Goal: Maintain or return to baseline ADL function  Description: INTERVENTIONS:  -  Assess patient's ability to carry out ADLs; assess patient's baseline for ADL function and identify physical deficits which impact ability to perform ADLs (bathing, care of mouth/teeth, toileting, grooming, dressing, etc )  - Assess/evaluate cause of self-care deficits   - Assess range of motion  - Assess patient's mobility; develop plan if impaired  - Assess patient's need for assistive devices and provide as appropriate  - Encourage maximum independence but intervene and supervise when necessary  - Involve family in performance of ADLs  - Assess for home care needs following discharge   - Consider OT consult to assist with ADL evaluation and planning for discharge  - Provide patient education as appropriate  Outcome: Progressing  Goal: Maintains/Returns to pre admission functional level  Description: INTERVENTIONS:  - Perform BMAT or MOVE assessment daily    - Set and communicate daily mobility goal to care team and patient/family/caregiver     - Collaborate with rehabilitation services on mobility goals if consulted  - Perform Range of Motion  times a day  - Reposition patient every  hours    - Dangle patient  times a day  - Stand patient  times a day  - Ambulate patient  times a day  - Out of bed to chair  times a day   - Out of bed for meals  times a day  - Out of bed for toileting  - Record patient progress and toleration of activity level   Outcome: Progressing     Problem: PAIN - ADULT  Goal: Verbalizes/displays adequate comfort level or baseline comfort level  Description: Interventions:  - Encourage patient to monitor pain and request assistance  - Assess pain using appropriate pain scale  - Administer analgesics based on type and severity of pain and evaluate response  - Implement non-pharmacological measures as appropriate and evaluate response  - Consider cultural and social influences on pain and pain management  - Notify physician/advanced practitioner if interventions unsuccessful or patient reports new pain  Outcome: Progressing     Problem: INFECTION - ADULT  Goal: Absence or prevention of progression during hospitalization  Description: INTERVENTIONS:  - Assess and monitor for signs and symptoms of infection  - Monitor lab/diagnostic results  - Monitor all insertion sites, i e  indwelling lines, tubes, and drains  - Monitor endotracheal if appropriate and nasal secretions for changes in amount and color  - [place] appropriate cooling/warming therapies per order  - Administer medications as ordered  - Instruct and encourage patient and family to use good hand hygiene technique  - Identify and instruct in appropriate isolation precautions for identified infection/condition  Outcome: Progressing  Goal: Absence of fever/infection during neutropenic period  Description: INTERVENTIONS:  - Monitor WBC    Outcome: Progressing     Problem: SAFETY ADULT  Goal: Patient will remain free of falls  Description: INTERVENTIONS:  - Educate patient/family on patient safety including physical limitations  - Instruct patient to call for assistance with activity   - Consult OT/PT to assist with strengthening/mobility   - Keep Call bell within reach  - Keep bed low and locked with side rails adjusted as appropriate  - Keep care items and personal belongings within reach  - Initiate and maintain comfort rounds  - Make Fall Risk Sign visible to staff  - Offer Toileting every  Hours, in advance of need  - Initiate/Maintain alarm  - Obtain necessary fall risk management equipment:   - Apply yellow socks and bracelet for high fall risk patients  - Consider moving patient to room near nurses station  Outcome: Progressing  Goal: Maintain or return to baseline ADL function  Description: INTERVENTIONS:  -  Assess patient's ability to carry out ADLs; assess patient's baseline for ADL function and identify physical deficits which impact ability to perform ADLs (bathing, care of mouth/teeth, toileting, grooming, dressing, etc )  - Assess/evaluate cause of self-care deficits   - Assess range of motion  - Assess patient's mobility; develop plan if impaired  - Assess patient's need for assistive devices and provide as appropriate  - Encourage maximum independence but intervene and supervise when necessary  - Involve family in performance of ADLs  - Assess for home care needs following discharge   - Consider OT consult to assist with ADL evaluation and planning for discharge  - Provide patient education as appropriate  Outcome: Progressing  Goal: Maintains/Returns to pre admission functional level  Description: INTERVENTIONS:  - Perform BMAT or MOVE assessment daily    - Set and communicate daily mobility goal to care team and patient/family/caregiver  - Collaborate with rehabilitation services on mobility goals if consulted  - Perform Range of Motion  times a day  - Reposition patient every  hours    - Dangle patient  times a day  - Stand patient  times a day  - Ambulate patient  times a day  - Out of bed to chair times a day   - Out of bed for meals times a day  - Out of bed for toileting  - Record patient progress and toleration of activity level   Outcome: Progressing     Problem: DISCHARGE PLANNING  Goal: Discharge to home or other facility with appropriate resources  Description: INTERVENTIONS:  - Identify barriers to discharge w/patient and caregiver  - Arrange for needed discharge resources and transportation as appropriate  - Identify discharge learning needs (meds, wound care, etc )  - Arrange for interpretive services to assist at discharge as needed  - Refer to Case Management Department for coordinating discharge planning if the patient needs post-hospital services based on physician/advanced practitioner order or complex needs related to functional status, cognitive ability, or social support system  Outcome: Progressing     Problem: Knowledge Deficit  Goal: Patient/family/caregiver demonstrates understanding of disease process, treatment plan, medications, and discharge instructions  Description: Complete learning assessment and assess knowledge base    Interventions:  - Provide teaching at level of understanding  - Provide teaching via preferred learning methods  Outcome: Progressing     Problem: Prexisting or High Potential for Compromised Skin Integrity  Goal: Skin integrity is maintained or improved  Description: INTERVENTIONS:  - Identify patients at risk for skin breakdown  - Assess and monitor skin integrity  - Assess and monitor nutrition and hydration status  - Monitor labs   - Assess for incontinence   - Turn and reposition patient  - Assist with mobility/ambulation  - Relieve pressure over bony prominences  - Avoid friction and shearing  - Provide appropriate hygiene as needed including keeping skin clean and dry  - Evaluate need for skin moisturizer/barrier cream  - Collaborate with interdisciplinary team   - Patient/family teaching  - Consider wound care consult   Outcome: Progressing

## 2023-04-14 NOTE — PLAN OF CARE
Problem: MOBILITY - ADULT  Goal: Maintain or return to baseline ADL function  Description: INTERVENTIONS:  - Educate patient/family on patient safety including physical limitations  - Instruct patient to call for assistance with activity   - Consult OT/PT to assist with strengthening/mobility   - Keep Call bell within reach  - Keep bed low and locked with side rails adjusted as appropriate  - Keep care items and personal belongings within reach  - Initiate and maintain comfort rounds  - Make Fall Risk Sign visible to staff  - Offer Toileting every 2 Hours, in advance of need  - Initiate/Maintain bed alarm  - Obtain necessary fall risk management equipment: bed alarm   - Apply yellow socks and bracelet for high fall risk patients  - Consider moving patient to room near nurses station  4/14/2023 0038 by Jaren Gardner RN  Outcome: Progressing  4/14/2023 0037 by Jaren Gardner RN  Outcome: Progressing  Goal: Maintains/Returns to pre admission functional level  Description: INTERVENTIONS:  -  Assess patient's ability to carry out ADLs; assess patient's baseline for ADL function and identify physical deficits which impact ability to perform ADLs (bathing, care of mouth/teeth, toileting, grooming, dressing, etc )  - Assess/evaluate cause of self-care deficits   - Assess range of motion  - Assess patient's mobility; develop plan if impaired  - Assess patient's need for assistive devices and provide as appropriate  - Encourage maximum independence but intervene and supervise when necessary  - Involve family in performance of ADLs  - Assess for home care needs following discharge   - Consider OT consult to assist with ADL evaluation and planning for discharge  - Provide patient education as appropriate  4/14/2023 0038 by Jaren Gardner RN  Outcome: Progressing  4/14/2023 0037 by Jaren Gardner RN  Outcome: Progressing     Problem: PAIN - ADULT  Goal: Verbalizes/displays adequate comfort level or baseline comfort level  Description: Interventions:  - Encourage patient to monitor pain and request assistance  - Assess pain using appropriate pain scale  - Administer analgesics based on type and severity of pain and evaluate response  - Implement non-pharmacological measures as appropriate and evaluate response  - Consider cultural and social influences on pain and pain management  - Notify physician/advanced practitioner if interventions unsuccessful or patient reports new pain  4/14/2023 0038 by Carla Matthews RN  Outcome: Progressing  4/14/2023 0037 by Carla Matthews RN  Outcome: Progressing     Problem: INFECTION - ADULT  Goal: Absence or prevention of progression during hospitalization  Description: INTERVENTIONS:  - Assess and monitor for signs and symptoms of infection  - Monitor lab/diagnostic results  - Monitor all insertion sites, i e  indwelling lines, tubes, and drains  - Monitor endotracheal if appropriate and nasal secretions for changes in amount and color  - [place] appropriate cooling/warming therapies per order  - Administer medications as ordered  - Instruct and encourage patient and family to use good hand hygiene technique  - Identify and instruct in appropriate isolation precautions for identified infection/condition  4/14/2023 0038 by Carla Matthews RN  Outcome: Progressing  4/14/2023 0037 by Carla Matthews RN  Outcome: Progressing  Goal: Absence of fever/infection during neutropenic period  Description: INTERVENTIONS:  - Monitor WBC    4/14/2023 0038 by Carla Matthews RN  Outcome: Progressing  4/14/2023 0037 by Carla Matthews RN  Outcome: Progressing     Problem: SAFETY ADULT  Goal: Patient will remain free of falls  Description: INTERVENTIONS:  - Educate patient/family on patient safety including physical limitations  - Instruct patient to call for assistance with activity   - Consult OT/PT to assist with strengthening/mobility   - Keep Call bell within reach  - Keep bed low and locked with side rails adjusted as appropriate  - Keep care items and personal belongings within reach  - Initiate and maintain comfort rounds  - Make Fall Risk Sign visible to staff  - Offer Toileting every 2 Hours, in advance of need  - Initiate/Maintain bed alarm  - Obtain necessary fall risk management equipment: bed alarm  - Apply yellow socks and bracelet for high fall risk patients  - Consider moving patient to room near nurses station  4/14/2023 0038 by Catalino Gu RN  Outcome: Progressing  4/14/2023 0037 by Catalino Gu RN  Outcome: Progressing  Goal: Maintain or return to baseline ADL function  Description: INTERVENTIONS:  - Educate patient/family on patient safety including physical limitations  - Instruct patient to call for assistance with activity   - Consult OT/PT to assist with strengthening/mobility   - Keep Call bell within reach  - Keep bed low and locked with side rails adjusted as appropriate  - Keep care items and personal belongings within reach  - Initiate and maintain comfort rounds  - Make Fall Risk Sign visible to staff  - Offer Toileting every 2 Hours, in advance of need  - Initiate/Maintain bed alarm  - Obtain necessary fall risk management equipment: bed alarm   - Apply yellow socks and bracelet for high fall risk patients  - Consider moving patient to room near nurses station  4/14/2023 0038 by Catalino Gu RN  Outcome: Progressing  4/14/2023 0037 by Catalino Gu RN  Outcome: Progressing  Goal: Maintains/Returns to pre admission functional level  Description: INTERVENTIONS:  -  Assess patient's ability to carry out ADLs; assess patient's baseline for ADL function and identify physical deficits which impact ability to perform ADLs (bathing, care of mouth/teeth, toileting, grooming, dressing, etc )  - Assess/evaluate cause of self-care deficits   - Assess range of motion  - Assess patient's mobility; develop plan if impaired  - Assess patient's need for assistive devices and provide as appropriate  - Encourage maximum independence but intervene and supervise when necessary  - Involve family in performance of ADLs  - Assess for home care needs following discharge   - Consider OT consult to assist with ADL evaluation and planning for discharge  - Provide patient education as appropriate  4/14/2023 0038 by Jenniffer Whiting RN  Outcome: Progressing  4/14/2023 0037 by Jenniffer Whiting RN  Outcome: Progressing     Problem: DISCHARGE PLANNING  Goal: Discharge to home or other facility with appropriate resources  Description: INTERVENTIONS:  - Identify barriers to discharge w/patient and caregiver  - Arrange for needed discharge resources and transportation as appropriate  - Identify discharge learning needs (meds, wound care, etc )  - Arrange for interpretive services to assist at discharge as needed  - Refer to Case Management Department for coordinating discharge planning if the patient needs post-hospital services based on physician/advanced practitioner order or complex needs related to functional status, cognitive ability, or social support system  4/14/2023 0038 by Jenniffer Whiting RN  Outcome: Progressing  4/14/2023 0037 by Jenniffer Whiting RN  Outcome: Progressing     Problem: Knowledge Deficit  Goal: Patient/family/caregiver demonstrates understanding of disease process, treatment plan, medications, and discharge instructions  Description: Complete learning assessment and assess knowledge base    Interventions:  - Provide teaching at level of understanding  - Provide teaching via preferred learning methods  4/14/2023 0038 by Jenniffer Whiting RN  Outcome: Progressing  4/14/2023 0037 by Jenniffer Whiting RN  Outcome: Progressing     Problem: Prexisting or High Potential for Compromised Skin Integrity  Goal: Skin integrity is maintained or improved  Description: INTERVENTIONS:  - Identify patients at risk for skin breakdown  - Assess and monitor skin integrity  - Assess and monitor nutrition and hydration status  - Monitor labs   - Assess for incontinence   - Turn and reposition patient  - Assist with mobility/ambulation  - Relieve pressure over bony prominences  - Avoid friction and shearing  - Provide appropriate hygiene as needed including keeping skin clean and dry  - Evaluate need for skin moisturizer/barrier cream  - Collaborate with interdisciplinary team   - Patient/family teaching  - Consider wound care consult   4/14/2023 0038 by Myriam Cline RN  Outcome: Progressing  4/14/2023 0037 by Myriam Cline RN  Outcome: Progressing

## 2023-04-14 NOTE — ASSESSMENT & PLAN NOTE
Results from last 7 days   Lab Units 04/14/23  0511 04/13/23  0504 04/12/23  0526 04/11/23  1404   SODIUM mmol/L 134* 129* 130* 128*   · Presented with low NA of 126  · Suspecting secondary to dehydration   · Started on IV fluids which improved sodium slowly  · Lasix currently on hold - urine sodium studies skewed since pt was on lasix prior to admission  · Discontinued IV fluids 4/13 given nearing euvolemic status  · Expect to begin Lasix in the next day  · Patient now eating and drinking regularly  · Recheck sodium much improved  · Will likely need reduction of home lasix

## 2023-04-14 NOTE — CASE MANAGEMENT
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   • Diabetic neuropathy ([facility] ) 2016   • Chronic suprapubic catheter ([facility] ) 2016   • Cellulitis 2016   • Thyroid nodule 2015   • Dyslipidemia 2015   • Cervical spinal stenosis 2014   • Neurogenic bladder 2014   • Generalized anxiety disorder 2014   • Depression 2014   • Confusion 2014   • Urinary retention 2014   • Obstructive sleep apnea 2013   • Benign colon polyp 2012   • Esophageal reflux 2012   • Fatty liver 2012   • Glaucoma 2012   • Hyperlipidemia 2012   • Multiple sclerosis ([facility] ) 2012   • Diabetes mellitus ([facility] ) 2012   • Hypertension 2012      LOS (days): 4  Geometric Mean LOS (GMLOS) (days): 3 80  Days to GMLOS:0 1     OBJECTIVE:    Risk of Unplanned Readmission Score: 12 32         Current admission status: Inpatient       Preferred Pharmacy:   [id number] [address]  Phone: [phone] Fax: [phone]    [address] [place] [address] Dr, [place] [address]  Phone: [phone] Fax: [phone]    Primary Care Provider: Enedelia Kathleen DO    Primary Insurance: Serene Evans [facility]  Secondary Insurance:     ASSESSMENT:  [address] [facility] [address] Representative - Brother   Primary Phone: [phone] (Home)               Advance Directives  Does patient have a [address]?: Yes  Does patient have Advance Directives?: Yes  Advance Directives: Living will  Primary Contact: Carleen Mak (brother) [phone]         Readmission Root Cause  30 Day Readmission: No    Patient Information  Admitted from[de-identified] Home  Mental Status: Alert  During Assessment patient was accompanied by: Not accompanied during assessment  Assessment information provided by[de-identified] Patient  Primary Caregiver: Family  Caregiver's Name[de-identified] Jaciel Price (siblings)  Caregiver's Relationship to Patient[de-identified] Family Member  Caregiver's Telephone Number[de-identified] [phone]  Support Systems: Family members, Friends/neighbors, Other (Comment)  [place] of Residence: [place] do you live in?: [place] entry access options  Select all that apply : Ramp  Type of Current Residence: Ranch  In the last 12 months, was there a time when you were not able to pay the mortgage or rent on time?: No  In the last 12 months, how many places have you lived?: 1  In the last 12 months, was there a time when you did not have a steady place to sleep or slept in a shelter (including now)?: No  Homeless/housing insecurity resource given?: N/A  Living Arrangements: Lives w/ Extended Family  Is patient a ?: No    Activities of Daily Living Prior to Admission  Functional Status: Total dependent  Completes ADLs independently?: No  Level of ADL dependence: Total Dependent  Ambulates independently?: No  Level of ambulatory dependence:  Total Dependent  Does patient use assisted devices?: Yes  Assisted Devices (DME) used: Bedside Commode, Straight Octavia Devan, Shower Chair, Wheelchair, Hospital Bed  Does patient currently own DME?: Yes  What DME does the patient currently own?: Bedside Commode, Wheelchair, Shower Chair, [employer], Straight [place], Walker  Does patient have a history of Outpatient Therapy (PT/OT)?: Yes ([facility])  Does the patient have a history of Short-Term Rehab?: No  Does patient have a history of HHC?: No  Does patient currently have [facility]?: No         Patient Information Continued  Income Source: Pension/[facility]  Does patient have prescription coverage?: Yes  Within the past 12 months, you worried that your food would run out before you got the money to buy more : Never true  Within the past 12 months, the food you bought just didn't last and you didn't have money to get more : Never true  Food insecurity resource given?: N/A  Does patient receive dialysis treatments?: No  Does patient have a history of substance abuse?: No  Does patient have a history of Mental Health Diagnosis?: Yes (Depression)  Is patient receiving treatment for mental health?: Yes  Has patient received inpatient treatment related to mental health in the last 2 years?: No         Means of Transportation  Means of Transport to Appts[de-identified] Family transport  In the past 12 months, has lack of transportation kept you from medical appointments or from getting medications?: No  In the past 12 months, has lack of transportation kept you from meetings, work, or from getting things needed for daily living?: No  Was application for public transport provided?: N/A        DISCHARGE DETAILS:    Discharge planning discussed with[de-identified] Patient  Freedom of Choice: Yes  Comments - Freedom of Choice: Pt would like to go to STR  CM contacted family/caregiver?: No- see comments (pt declined)  Were Treatment Team discharge recommendations reviewed with patient/caregiver?: Yes  Did patient/caregiver verbalize understanding of patient care needs?: Yes  Were patient/caregiver advised of the risks associated with not following Treatment Team discharge recommendations?: Yes         [address]         Is the patient interested in [facility] at discharge?: No    DME Referral Provided  Referral made for DME?: No              Treatment Team Recommendation: Short Term Rehab  Discharge Destination Plan[de-identified] Short Term Rehab                                         Additional Comments: CM met w/ pt at bedside to complete assessment & discuss d/c planning  Pt currently lives in a ranch level home w/ his siblings & a ramp to enter  Pt is WC bound & dependent on his ADL's & IADL's  Pt's siblings are his caregivers  Pt uses cane, walker, shower chair, commode, wheelchair & hospital bed daily  Pt does not have [employer] currently but does go to [facility] 2-3x a week for OP PT/OT  Pt brother Amparo Solis is EC  Pt does have a LW but not on file  CM discussed PT/OT recommendation for pt to do STR stay before d/c home  Pt is interested in STR  CM sent out referrals & will go over choices when they become availible  Pt denied having any other questions or concerns at this time  CM to continue to follow pt care & d/c

## 2023-04-14 NOTE — ASSESSMENT & PLAN NOTE
· Metabolic encephalopathy likely secondary to UTI vs xanax vs dehydration vs hyponatremia  · Continue plan above  · Mental status improved

## 2023-04-15 LAB
BACTERIA BLD CULT: NORMAL
BACTERIA BLD CULT: NORMAL
GLUCOSE SERPL-MCNC: 127 MG/DL (ref 65–140)
GLUCOSE SERPL-MCNC: 153 MG/DL (ref 65–140)
GLUCOSE SERPL-MCNC: 172 MG/DL (ref 65–140)
GLUCOSE SERPL-MCNC: 174 MG/DL (ref 65–140)

## 2023-04-15 RX ADMIN — GABAPENTIN 600 MG: 300 CAPSULE ORAL at 22:49

## 2023-04-15 RX ADMIN — GABAPENTIN 300 MG: 300 CAPSULE ORAL at 09:58

## 2023-04-15 RX ADMIN — ENOXAPARIN SODIUM 40 MG: 100 INJECTION SUBCUTANEOUS at 09:58

## 2023-04-15 RX ADMIN — CLOPIDOGREL BISULFATE 75 MG: 75 TABLET ORAL at 09:58

## 2023-04-15 RX ADMIN — SENNOSIDES AND DOCUSATE SODIUM 2 TABLET: 8.6; 5 TABLET ORAL at 22:49

## 2023-04-15 RX ADMIN — LATANOPROST 1 DROP: 50 SOLUTION OPHTHALMIC at 22:48

## 2023-04-15 RX ADMIN — SERTRALINE HYDROCHLORIDE 25 MG: 25 TABLET ORAL at 22:49

## 2023-04-15 RX ADMIN — INSULIN LISPRO 1 UNITS: 100 INJECTION, SOLUTION INTRAVENOUS; SUBCUTANEOUS at 12:38

## 2023-04-15 RX ADMIN — AMLODIPINE BESYLATE 10 MG: 10 TABLET ORAL at 17:12

## 2023-04-15 RX ADMIN — INSULIN LISPRO 1 UNITS: 100 INJECTION, SOLUTION INTRAVENOUS; SUBCUTANEOUS at 17:12

## 2023-04-15 RX ADMIN — PROPRANOLOL HYDROCHLORIDE 60 MG: 60 CAPSULE, EXTENDED RELEASE ORAL at 09:59

## 2023-04-15 RX ADMIN — ATORVASTATIN CALCIUM 80 MG: 80 TABLET, FILM COATED ORAL at 17:12

## 2023-04-15 RX ADMIN — GABAPENTIN 300 MG: 300 CAPSULE ORAL at 17:12

## 2023-04-15 NOTE — ASSESSMENT & PLAN NOTE
· Continue home sertraline  · Alprazolam 0 25 mg bid prn  · Decreased alprazolam to 0 25 mg daily prn   · Appears patient has not been receiving xanax - will now discontinue

## 2023-04-15 NOTE — ASSESSMENT & PLAN NOTE
Lab Results   Component Value Date    HGBA1C 8 7 05/10/2021     Recent Labs     04/14/23  1100 04/14/23  1607 04/14/23  2052 04/15/23  0713   POCGLU 186* 161* 181* 127   · Hold oral hypoglycemics and Trulicity  · Monitor Accu-Cheks, sliding scale for coverage

## 2023-04-15 NOTE — ASSESSMENT & PLAN NOTE
Results from last 7 days   Lab Units 04/14/23  0511 04/13/23  0504 04/12/23  0526 04/11/23  1404   SODIUM mmol/L 134* 129* 130* 128*   · Presented with low NA of 126  · Suspecting secondary to dehydration   · Started on IV fluids which improved sodium slowly  · Lasix currently on hold - urine sodium studies skewed since pt was on lasix prior to admission  · Discontinued IV fluids 4/13 given nearing euvolemic status  · Patient now eating and drinking regularly  · Recheck sodium much improved  · Will resume lasix but at lower dose of 20 mg daily (previously on 40 mg daily)

## 2023-04-15 NOTE — ASSESSMENT & PLAN NOTE
This is a 71-year-old male with history of longstanding multiple sclerosis, wheelchair-bound, hypertension, hyperlipidemia, neurogenic bladder with chronic suprapubic catheter in place senting with confusion  Lives at home with his brother and sister  Has had nausea and poor oral intake at home  · Initially thought to have UTI given UA with: moderate leukocytes, positive nitrite, moderate blood, innumerable WBC and bacteria  · He has history of E  coli, allergic to cephalexin, was started on Levaquin  · Blood culture negative at 4 days  · Consider other etiology being hyponatremia in the setting of dehydration and xanax use  · However with suspicion this is a true UTI given confusion, tube discomfort, leukocytosis, and elevated procal- WBC appearing to improve on IV antibiotics   Also suspect some underlying cognitive decline at this point  · Recommend discontinuing Xanax  · Completed 3-day IV antibiotic course  · Mental status improved  · PT OT now recommending short-term rehab given deconditioned status- pt agreeable

## 2023-04-15 NOTE — PLAN OF CARE
Problem: Potential for Falls  Goal: Patient will remain free of falls  Description: INTERVENTIONS:  - Educate patient/family on patient safety including physical limitations  - Instruct patient to call for assistance with activity   - Consult OT/PT to assist with strengthening/mobility   - Keep Call bell within reach  - Keep bed low and locked with side rails adjusted as appropriate  - Keep care items and personal belongings within reach  - Initiate and maintain comfort rounds  - Make Fall Risk Sign visible to staff  - Offer Toileting every 2 Hours, in advance of need  - Initiate/Maintain bed alarm  - Obtain necessary fall risk management equipment: alarms  - Apply yellow socks and bracelet for high fall risk patients  - Consider moving patient to room near nurses station  Outcome: Progressing     Problem: MOBILITY - ADULT  Goal: Maintain or return to baseline ADL function  Description: INTERVENTIONS:  -  Assess patient's ability to carry out ADLs; assess patient's baseline for ADL function and identify physical deficits which impact ability to perform ADLs (bathing, care of mouth/teeth, toileting, grooming, dressing, etc )  - Assess/evaluate cause of self-care deficits   - Assess range of motion  - Assess patient's mobility; develop plan if impaired  - Assess patient's need for assistive devices and provide as appropriate  - Encourage maximum independence but intervene and supervise when necessary  - Involve family in performance of ADLs  - Assess for home care needs following discharge   - Consider OT consult to assist with ADL evaluation and planning for discharge  - Provide patient education as appropriate  Outcome: Progressing  Goal: Maintains/Returns to pre admission functional level  Description: INTERVENTIONS:  - Perform BMAT or MOVE assessment daily    - Set and communicate daily mobility goal to care team and patient/family/caregiver     - Collaborate with rehabilitation services on mobility goals if consulted  - Reposition patient every 2 hours    -- Record patient progress and toleration of activity level   Outcome: Progressing     Problem: PAIN - ADULT  Goal: Verbalizes/displays adequate comfort level or baseline comfort level  Description: Interventions:  - Encourage patient to monitor pain and request assistance  - Assess pain using appropriate pain scale  - Administer analgesics based on type and severity of pain and evaluate response  - Implement non-pharmacological measures as appropriate and evaluate response  - Consider cultural and social influences on pain and pain management  - Notify physician/advanced practitioner if interventions unsuccessful or patient reports new pain  Outcome: Progressing     Problem: INFECTION - ADULT  Goal: Absence or prevention of progression during hospitalization  Description: INTERVENTIONS:  - Assess and monitor for signs and symptoms of infection  - Monitor lab/diagnostic results  - Monitor all insertion sites, i e  indwelling lines, tubes, and drains  - [place] appropriate cooling/warming therapies per order  - Administer medications as ordered  - Instruct and encourage patient and family to use good hand hygiene technique  - Identify and instruct in appropriate isolation precautions for identified infection/condition  Outcome: Progressing     Problem: SAFETY ADULT  Goal: Patient will remain free of falls  Description: INTERVENTIONS:  - Educate patient/family on patient safety including physical limitations  - Instruct patient to call for assistance with activity   - Consult OT/PT to assist with strengthening/mobility   - Keep Call bell within reach  - Keep bed low and locked with side rails adjusted as appropriate  - Keep care items and personal belongings within reach  - Initiate and maintain comfort rounds  - Make Fall Risk Sign visible to staff  - Offer Toileting every 2 Hours, in advance of need  - Initiate/Maintain bed alarm  - Obtain necessary fall risk management equipment: alarms  - Apply yellow socks and bracelet for high fall risk patients  - Consider moving patient to room near nurses station  Outcome: Progressing  Goal: Maintain or return to baseline ADL function  Description: INTERVENTIONS:  -  Assess patient's ability to carry out ADLs; assess patient's baseline for ADL function and identify physical deficits which impact ability to perform ADLs (bathing, care of mouth/teeth, toileting, grooming, dressing, etc )  - Assess/evaluate cause of self-care deficits   - Assess range of motion  - Assess patient's mobility; develop plan if impaired  - Assess patient's need for assistive devices and provide as appropriate  - Encourage maximum independence but intervene and supervise when necessary  - Involve family in performance of ADLs  - Assess for home care needs following discharge   - Consider OT consult to assist with ADL evaluation and planning for discharge  - Provide patient education as appropriate  Outcome: Progressing  Goal: Maintains/Returns to pre admission functional level  Description: INTERVENTIONS:  - Perform BMAT or MOVE assessment daily    - Set and communicate daily mobility goal to care team and patient/family/caregiver  - Collaborate with rehabilitation services on mobility goals if consulted  - Reposition patient every 2 hours    -- Record patient progress and toleration of activity level   Outcome: Progressing     Problem: DISCHARGE PLANNING  Goal: Discharge to home or other facility with appropriate resources  Description: INTERVENTIONS:  - Identify barriers to discharge w/patient and caregiver  - Arrange for needed discharge resources and transportation as appropriate  - Identify discharge learning needs (meds, wound care, etc )  - Refer to Case Management Department for coordinating discharge planning if the patient needs post-hospital services based on physician/advanced practitioner order or complex needs related to functional status, cognitive ability, or social support system  Outcome: Progressing     Problem: Knowledge Deficit  Goal: Patient/family/caregiver demonstrates understanding of disease process, treatment plan, medications, and discharge instructions  Description: Complete learning assessment and assess knowledge base    Interventions:  - Provide teaching at level of understanding  - Provide teaching via preferred learning methods  Outcome: Progressing     Problem: Prexisting or High Potential for Compromised Skin Integrity  Goal: Skin integrity is maintained or improved  Description: INTERVENTIONS:  - Identify patients at risk for skin breakdown  - Assess and monitor skin integrity  - Assess and monitor nutrition and hydration status  - Monitor labs   - Assess for incontinence   - Turn and reposition patient  - Assist with mobility/ambulation  - Relieve pressure over bony prominences  - Avoid friction and shearing  - Provide appropriate hygiene as needed including keeping skin clean and dry  - Evaluate need for skin moisturizer/barrier cream  - Collaborate with interdisciplinary team   - Patient/family teaching  - Consider wound care consult   Outcome: Progressing     Problem: METABOLIC, FLUID AND ELECTROLYTES - ADULT  Goal: Glucose maintained within target range  Description: INTERVENTIONS:  - Monitor Blood Glucose as ordered  - Assess for signs and symptoms of hyperglycemia and hypoglycemia  - Administer ordered medications to maintain glucose within target range  - Assess nutritional intake and initiate nutrition service referral as needed  Outcome: Progressing

## 2023-04-15 NOTE — PROGRESS NOTES
[address] [place] [address]  Progress Note  Name: Kamila Otero  MRN: [id number]  Unit/Bed#: E4 -01 I Date of Admission: 4/10/2023   Date of Service: 4/15/2023 I Hospital Day: 5    Assessment/Plan   * Confusion  Assessment & Plan  This is a 76-year-old male with history of longstanding multiple sclerosis, wheelchair-bound, hypertension, hyperlipidemia, neurogenic bladder with chronic suprapubic catheter in place senting with confusion  Lives at home with his brother and sister  Has had nausea and poor oral intake at home  · Initially thought to have UTI given UA with: moderate leukocytes, positive nitrite, moderate blood, innumerable WBC and bacteria  · He has history of E  coli, allergic to cephalexin, was started on Levaquin  · Blood culture negative at 4 days  · Consider other etiology being hyponatremia in the setting of dehydration and xanax use  · However with suspicion this is a true UTI given confusion, tube discomfort, leukocytosis, and elevated procal- WBC appearing to improve on IV antibiotics  Also suspect some underlying cognitive decline at this point  · Recommend discontinuing Xanax  · Completed 3-day IV antibiotic course  · Mental status improved  · PT OT now recommending short-term rehab given deconditioned status- pt agreeable    Chronic diastolic congestive heart failure ([address] )  Assessment & Plan  · Echo from 2018 with EF of 75% and grade 1 diastolic dysfunction  · Maintained on Lasix 40 mg daily at home  · Lasix was held in the setting of dehydration here    · Lower dosage of Lasix to 20 mg daily upon discharge    Hyponatremia  Assessment & Plan  Results from last 7 days   Lab Units 04/14/23  0511 04/13/23  0504 04/12/23  0526 04/11/23  1404   SODIUM mmol/L 134* 129* 130* 128*   · Presented with low NA of 126  · Suspecting secondary to dehydration   · Started on IV fluids which improved sodium slowly  · Lasix currently on hold - urine sodium studies skewed since pt was on lasix prior to admission  · Discontinued IV fluids 4/13 given nearing euvolemic status  · Patient now eating and drinking regularly  · Recheck sodium much improved  · Will resume lasix but at lower dose of 20 mg daily (previously on 40 mg daily)    Acute metabolic encephalopathy  Assessment & Plan  · Metabolic encephalopathy likely secondary to UTI vs xanax vs dehydration vs hyponatremia  · Continue plan above  · Mental status improved    History of CVA (cerebrovascular accident)  Assessment & Plan  · History of CVA in October 2018  · CTA head and neck negative for any acute infarct, hemorrhage or mass effect, focal moderate to severe stenosis in P3 segment of right posterior cerebral artery  · Continue plavix and lipitor    Diabetes mellitus [facility])  Assessment & Plan  Lab Results   Component Value Date    HGBA1C 8 7 05/10/2021     Recent Labs     04/14/23  1100 04/14/23  1607 04/14/23  2052 04/15/23  0713   POCGLU 186* 161* 181* 127   · Hold oral hypoglycemics and Trulicity  · Monitor Accu-Cheks, sliding scale for coverage    Neurogenic bladder  Assessment & Plan  · Chronic suprapubic catheter in place    Multiple sclerosis (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  · CTA head and neck stable    Depression  Assessment & Plan  · Continue home sertraline  · Alprazolam 0 25 mg bid prn  · Decreased alprazolam to 0 25 mg daily prn   · Appears patient has not been receiving xanax - will now discontinue          VTE Pharmacologic Prophylaxis:   Pharmacologic: Enoxaparin (Lovenox)  Mechanical VTE Prophylaxis in Place: Yes    Discharge Plan: With need for continued inpatient stay for short-term rehab placement    Discussions with Specialists or Other Care Team Provider: Nursing    Education and Discussions with Family / Patient: Patient    Time Spent for Care: 30 minutes  More than 50% of total time spent on counseling and coordination of care as described above  Current Length of Stay: 5 day(s)  Current Patient Status: Inpatient   Code Status: Level 1 - Full Code    Subjective:   Resting in bed  Feels okay  No acute events overnight  No complaints  Agreeable to rehab at this point  Objective:     Vitals:   Temp (24hrs), Av 7 °F (36 5 °C), Min:97 3 °F (36 3 °C), Max:98 °F (36 7 °C)    Temp:  [97 3 °F (36 3 °C)-98 °F (36 7 °C)] 97 8 °F (36 6 °C)  HR:  [74-83] 83  Resp:  [18] 18  BP: (114-150)/(59-68) 114/59  SpO2:  [93 %-95 %] 94 %  Body mass index is 30 88 kg/m²  Input and Output Summary (last 24 hours): Intake/Output Summary (Last 24 hours) at 4/15/2023 1036  Last data filed at 4/15/2023 0529  Gross per 24 hour   Intake 520 ml   Output 1125 ml   Net -605 ml       Physical Exam:     Physical Exam  Vitals and nursing note reviewed  Constitutional:       General: He is not in acute distress  Appearance: Normal appearance  He is normal weight  He is not ill-appearing, toxic-appearing or diaphoretic  HENT:      Head: Normocephalic and atraumatic  Eyes:      General: No scleral icterus  Cardiovascular:      Rate and Rhythm: Normal rate and regular rhythm  Pulmonary:      Effort: Pulmonary effort is normal  No respiratory distress  Breath sounds: Normal breath sounds  No stridor  No wheezing or rhonchi  Abdominal:      General: Bowel sounds are normal  There is no distension  Palpations: Abdomen is soft  There is no mass  Tenderness: There is no abdominal tenderness  Hernia: No hernia is present     Genitourinary:     Comments: Suprapubic catheter in place draining yellow urine  Skin:     General: Skin is warm and dry  Neurological:      Mental Status: He is oriented to person, place, and time  Mental status is at baseline  Psychiatric:         Mood and Affect: Mood normal          Behavior: Behavior normal          Additional Data:     Labs:    Results from last 7 days   Lab Units 04/12/23  0526 04/11/23  0204   WBC Thousand/uL 7 90 11 53*   HEMOGLOBIN g/dL 13 4 14 4   HEMATOCRIT % 38 8 41 6   PLATELETS Thousands/uL 273 271   NEUTROS PCT %  --  78*   LYMPHS PCT %  --  13*   MONOS PCT %  --  7   EOS PCT %  --  1     Results from last 7 days   Lab Units 04/14/23  0511 04/13/23  0504 04/12/23  0526   POTASSIUM mmol/L 3 6   < > 3 4*   CHLORIDE mmol/L 103   < > 99   CO2 mmol/L 23   < > 21   BUN mg/dL 6   < > 6   CREATININE mg/dL 0 77   < > 0 76   CALCIUM mg/dL 9 3   < > 8 6   ALK PHOS U/L  --   --  72   ALT U/L  --   --  15   AST U/L  --   --  17    < > = values in this interval not displayed  * I Have Reviewed All Lab Data Listed Above  * Additional Pertinent Lab Tests Reviewed: All Labs Within Last 24 Hours Reviewed    Imaging:    Imaging Reports Reviewed Today Include:   Imaging Personally Reviewed by Myself Includes:      Recent Cultures (last 7 days):     Results from last 7 days   Lab Units 04/10/23  1723 04/10/23  1712 04/10/23  1542   BLOOD CULTURE  No Growth After 4 Days  No Growth After 4 Days    --    URINE CULTURE   --   --  >100,000 cfu/ml Escherichia coli*  40,000-49,000 cfu/ml Enterococcus faecalis*       Last 24 Hours Medication List:   Current Facility-Administered Medications   Medication Dose Route Frequency Provider Last Rate   • acetaminophen  650 mg Oral Q6H PRN Brigitte Davison MD     • albuterol  2 5 mg Nebulization Q6H PRN Brigitte Davison MD     • ALPRAZolam  0 25 mg Oral Daily PRN Song Raymundo PA-C     • amLODIPine  10 mg Oral Daily With Radha Herrera MD      And   • atorvastatin  80 mg Oral Daily With Radha Herrera MD     • clopidogrel 75 mg Oral Daily Cinthia Joseph MD     • enoxaparin  40 mg Subcutaneous Daily Cinthia Joseph MD     • gabapentin  300 mg Oral BID Cinthia Joseph MD     • gabapentin  600 mg Oral HS Cinthia Joseph MD     • insulin lispro  1-5 Units Subcutaneous TID AC Cinthia Joseph MD     • latanoprost  1 drop Both Eyes HS Cinthia Joseph MD     • melatonin  3 mg Oral HS PRN Cinthia Joseph MD     • propranolol  60 mg Oral Daily Cinthia Joseph MD     • senna-docusate sodium  2 tablet Oral HS Cinthia Joseph MD     • sertraline  25 mg Oral HS Cinthia Joseph MD          Today, Patient Was Seen By: Nisa Soria PA-C    ** Please Note: This note has been constructed using a voice recognition system   **

## 2023-04-15 NOTE — PLAN OF CARE
Problem: DISCHARGE PLANNING  Goal: Discharge to home or other facility with appropriate resources  Description: INTERVENTIONS:  - Identify barriers to discharge w/patient and caregiver  - Arrange for needed discharge resources and transportation as appropriate  - Identify discharge learning needs (meds, wound care, etc )  - Refer to Case Management Department for coordinating discharge planning if the patient needs post-hospital services based on physician/advanced practitioner order or complex needs related to functional status, cognitive ability, or social support system  Outcome: Progressing     Problem: Knowledge Deficit  Goal: Patient/family/caregiver demonstrates understanding of disease process, treatment plan, medications, and discharge instructions  Description: Complete learning assessment and assess knowledge base    Interventions:  - Provide teaching at level of understanding  - Provide teaching via preferred learning methods  Outcome: Progressing

## 2023-04-16 LAB
GLUCOSE SERPL-MCNC: 141 MG/DL (ref 65–140)
GLUCOSE SERPL-MCNC: 152 MG/DL (ref 65–140)
GLUCOSE SERPL-MCNC: 175 MG/DL (ref 65–140)
GLUCOSE SERPL-MCNC: 189 MG/DL (ref 65–140)

## 2023-04-16 RX ORDER — FUROSEMIDE 20 MG/1
20 TABLET ORAL DAILY
Status: DISCONTINUED | OUTPATIENT
Start: 2023-04-16 | End: 2023-04-18 | Stop reason: HOSPADM

## 2023-04-16 RX ADMIN — ENOXAPARIN SODIUM 40 MG: 100 INJECTION SUBCUTANEOUS at 09:29

## 2023-04-16 RX ADMIN — PROPRANOLOL HYDROCHLORIDE 60 MG: 60 CAPSULE, EXTENDED RELEASE ORAL at 09:30

## 2023-04-16 RX ADMIN — FUROSEMIDE 20 MG: 20 TABLET ORAL at 09:29

## 2023-04-16 RX ADMIN — LATANOPROST 1 DROP: 50 SOLUTION OPHTHALMIC at 23:01

## 2023-04-16 RX ADMIN — SERTRALINE HYDROCHLORIDE 25 MG: 25 TABLET ORAL at 23:02

## 2023-04-16 RX ADMIN — ATORVASTATIN CALCIUM 80 MG: 80 TABLET, FILM COATED ORAL at 17:41

## 2023-04-16 RX ADMIN — GABAPENTIN 600 MG: 300 CAPSULE ORAL at 23:01

## 2023-04-16 RX ADMIN — INSULIN LISPRO 1 UNITS: 100 INJECTION, SOLUTION INTRAVENOUS; SUBCUTANEOUS at 17:41

## 2023-04-16 RX ADMIN — GABAPENTIN 300 MG: 300 CAPSULE ORAL at 17:40

## 2023-04-16 RX ADMIN — INSULIN LISPRO 1 UNITS: 100 INJECTION, SOLUTION INTRAVENOUS; SUBCUTANEOUS at 13:10

## 2023-04-16 RX ADMIN — AMLODIPINE BESYLATE 10 MG: 10 TABLET ORAL at 17:41

## 2023-04-16 RX ADMIN — GABAPENTIN 300 MG: 300 CAPSULE ORAL at 09:29

## 2023-04-16 RX ADMIN — CLOPIDOGREL BISULFATE 75 MG: 75 TABLET ORAL at 09:29

## 2023-04-16 RX ADMIN — SENNOSIDES AND DOCUSATE SODIUM 2 TABLET: 8.6; 5 TABLET ORAL at 23:01

## 2023-04-16 NOTE — ASSESSMENT & PLAN NOTE
· Echo from 2018 with EF of 75% and grade 1 diastolic dysfunction  · Maintained on Lasix 40 mg daily at home  · Lasix was held in the setting of dehydration here    · Lowered dosage of Lasix to 20 mg daily upon discharge

## 2023-04-16 NOTE — ASSESSMENT & PLAN NOTE
This is a 69-year-old male with history of longstanding multiple sclerosis, wheelchair-bound, hypertension, hyperlipidemia, neurogenic bladder with chronic suprapubic catheter in place senting with confusion  Lives at home with his brother and sister  Has had nausea and poor oral intake at home  · Initially thought to have UTI given UA with: moderate leukocytes, positive nitrite, moderate blood, innumerable WBC and bacteria  · He has history of E  coli, allergic to cephalexin, was started on Levaquin  · Blood culture negative at 4 days  · Consider other etiology being hyponatremia in the setting of dehydration and xanax use  · However with suspicion this is a true UTI given confusion, tube discomfort, leukocytosis, and elevated procal- WBC appearing to improve on IV antibiotics   Also suspect some underlying cognitive decline at this point  · Recommend discontinuing Xanax  · Completed 3-day IV antibiotic course  · Mental status improved  · PT OT now recommending short-term rehab given deconditioned status- pt agreeable

## 2023-04-16 NOTE — ASSESSMENT & PLAN NOTE
Lab Results   Component Value Date    HGBA1C 8 7 05/10/2021     Recent Labs     04/15/23  1617 04/15/23  2105 04/16/23  0732 04/16/23  1158   POCGLU 153* 174* 141* 189*   · Hold oral hypoglycemics and Trulicity  · Monitor Accu-Cheks, sliding scale for coverage

## 2023-04-16 NOTE — PROGRESS NOTES
[address] [place] [address]  Progress Note  Name: Tarsha Cronin  MRN: [id number]  Unit/Bed#: E4 -01 I Date of Admission: 4/10/2023   Date of Service: 4/16/2023 I Hospital Day: 6    Assessment/Plan   * Confusion  Assessment & Plan  This is a 43-year-old male with history of longstanding multiple sclerosis, wheelchair-bound, hypertension, hyperlipidemia, neurogenic bladder with chronic suprapubic catheter in place senting with confusion  Lives at home with his brother and sister  Has had nausea and poor oral intake at home  · Initially thought to have UTI given UA with: moderate leukocytes, positive nitrite, moderate blood, innumerable WBC and bacteria  · He has history of E  coli, allergic to cephalexin, was started on Levaquin  · Blood culture negative at 4 days  · Consider other etiology being hyponatremia in the setting of dehydration and xanax use  · However with suspicion this is a true UTI given confusion, tube discomfort, leukocytosis, and elevated procal- WBC appearing to improve on IV antibiotics  Also suspect some underlying cognitive decline at this point  · Recommend discontinuing Xanax  · Completed 3-day IV antibiotic course  · Mental status improved  · PT OT now recommending short-term rehab given deconditioned status- pt agreeable    Chronic diastolic congestive heart failure ([address] )  Assessment & Plan  · Echo from 2018 with EF of 75% and grade 1 diastolic dysfunction  · Maintained on Lasix 40 mg daily at home  · Lasix was held in the setting of dehydration here    · Lowered dosage of Lasix to 20 mg daily upon discharge    Hyponatremia  Assessment & Plan  Results from last 7 days   Lab Units 04/14/23  0511 04/13/23  0504 04/12/23  0526 04/11/23  1404   SODIUM mmol/L 134* 129* 130* 128*   · Presented with low NA of 126  · Suspecting secondary to dehydration   · Started on IV fluids which improved sodium slowly  · Lasix currently on hold - urine sodium studies skewed since pt was on lasix prior to admission  · Discontinued IV fluids 4/13 given nearing euvolemic status  · Patient now eating and drinking regularly  · Recheck sodium much improved  · Resumed lasix but at lower dose of 20 mg daily today (previously on 40 mg daily)    Acute metabolic encephalopathy  Assessment & Plan  · Metabolic encephalopathy likely secondary to UTI vs xanax vs dehydration vs hyponatremia  · Continue plan above  · Mental status improved    History of CVA (cerebrovascular accident)  Assessment & Plan  · History of CVA in October 2018  · CTA head and neck negative for any acute infarct, hemorrhage or mass effect, focal moderate to severe stenosis in P3 segment of right posterior cerebral artery  · Continue plavix and lipitor    Diabetes mellitus [facility])  Assessment & Plan  Lab Results   Component Value Date    HGBA1C 8 7 05/10/2021     Recent Labs     04/15/23  1617 04/15/23  2105 04/16/23  0732 04/16/23  1158   POCGLU 153* 174* 141* 189*   · Hold oral hypoglycemics and Trulicity  · Monitor Accu-Cheks, sliding scale for coverage    Neurogenic bladder  Assessment & Plan  · Chronic suprapubic catheter in place    Multiple sclerosis ([facility] [address] )  Assessment & Plan  · Patient with history of longstanding multiple sclerosis  · Chronic lower extremity weakness, wheelchair-bound  · On gabapentin 300 mg twice daily and 600 mg at bedtime    Hypertension  Assessment & Plan  · Home regimen propranolol 60 mg daily, amlodipine 10 mg daily  · BP stable here    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HCC)  Assessment & Plan  · 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  · CTA head and neck stable    Depression  Assessment & Plan  · Continue home sertraline  · Alprazolam 0 25 mg bid prn  · Decreased alprazolam to 0 25 mg daily prn   · Appears patient has not been receiving xanax - this has been discontinued         VTE Pharmacologic Prophylaxis:   Pharmacologic: Enoxaparin (Lovenox)  Mechanical VTE Prophylaxis in Place: Yes    Discharge Plan: With need for continued inpatient stay for short-term rehab placement    Discussions with Specialists or Other Care Team Provider: Nursing    Education and Discussions with Family / Patient: Patient    Time Spent for Care: 45 minutes  More than 50% of total time spent on counseling and coordination of care as described above  Current Length of Stay: 6 day(s)  Current Patient Status: Inpatient   Code Status: Level 1 - Full Code    Subjective:   Resting in bed  Has no acute complaints  Suprapubic catheter draining well  Agreeable to rehab  Objective:     Vitals:   Temp (24hrs), Av 1 °F (36 7 °C), Min:97 6 °F (36 4 °C), Max:98 8 °F (37 1 °C)    Temp:  [97 6 °F (36 4 °C)-98 8 °F (37 1 °C)] 97 6 °F (36 4 °C)  HR:  [73-77] 77  Resp:  [18] 18  BP: (128-134)/(63-67) 128/67  SpO2:  [92 %-95 %] 92 %  Body mass index is 30 88 kg/m²  Input and Output Summary (last 24 hours): Intake/Output Summary (Last 24 hours) at 2023 1232  Last data filed at 2023 5451  Gross per 24 hour   Intake --   Output 1450 ml   Net -1450 ml       Physical Exam:     Physical Exam  Vitals and nursing note reviewed  Constitutional:       General: He is not in acute distress  Appearance: Normal appearance  He is normal weight  He is not ill-appearing, toxic-appearing or diaphoretic  HENT:      Head: Normocephalic and atraumatic  Eyes:      General: No scleral icterus  Cardiovascular:      Rate and Rhythm: Normal rate and regular rhythm  Pulmonary:      Effort: Pulmonary effort is normal  No respiratory distress  Breath sounds: Normal breath sounds  No stridor  No wheezing or rhonchi  Abdominal:      General: Bowel sounds are normal  There is no distension  Palpations: Abdomen is soft  There is no mass  Tenderness: There is no abdominal tenderness  Hernia: No hernia is present     Genitourinary:     Comments: Suprapubic catheter in place draining yellow urine  Musculoskeletal:         General: No swelling  Cervical back: Neck supple  Skin:     General: Skin is warm and dry  Neurological:      Mental Status: He is oriented to person, place, and time  Mental status is at baseline  Psychiatric:         Mood and Affect: Mood normal          Behavior: Behavior normal          Additional Data:     Labs:    Results from last 7 days   Lab Units 04/12/23  0526 04/11/23  0204   WBC Thousand/uL 7 90 11 53*   HEMOGLOBIN g/dL 13 4 14 4   HEMATOCRIT % 38 8 41 6   PLATELETS Thousands/uL 273 271   NEUTROS PCT %  --  78*   LYMPHS PCT %  --  13*   MONOS PCT %  --  7   EOS PCT %  --  1     Results from last 7 days   Lab Units 04/14/23  0511 04/13/23  0504 04/12/23  0526   POTASSIUM mmol/L 3 6   < > 3 4*   CHLORIDE mmol/L 103   < > 99   CO2 mmol/L 23   < > 21   BUN mg/dL 6   < > 6   CREATININE mg/dL 0 77   < > 0 76   CALCIUM mg/dL 9 3   < > 8 6   ALK PHOS U/L  --   --  72   ALT U/L  --   --  15   AST U/L  --   --  17    < > = values in this interval not displayed  * I Have Reviewed All Lab Data Listed Above  * Additional Pertinent Lab Tests Reviewed: [facility] Ad[facility] Reviewed    Imaging:    Imaging Reports Reviewed Today Include:   Imaging Personally Reviewed by Myself Includes:      Recent Cultures (last 7 days):     Results from last 7 days   Lab Units 04/10/23  1723 04/10/23  1712 04/10/23  1542   BLOOD CULTURE  No Growth After 5 Days  No Growth After 5 Days    --    URINE CULTURE   --   --  >100,000 cfu/ml Escherichia coli*  40,000-49,000 cfu/ml Enterococcus faecalis*       Last 24 Hours Medication List:   Current Facility-Administered Medications   Medication Dose Route Frequency Provider Last Rate   • acetaminophen  650 mg Oral Q6H PRN Radha Wilde MD     • albuterol  2 5 mg Nebulization Q6H PRN Radha Wilde MD     • ALPRAZolam  0 25 mg Oral Daily PRN Teofilo Hollis PA-C     • amLODIPine  10 mg Oral Daily With [employer] Karolyn Pal MD      And   • atorvastatin  80 mg Oral Daily With Kelli Patterson MD     • clopidogrel  75 mg Oral Daily Cordelia Nails MD     • enoxaparin  40 mg Subcutaneous Daily Cordelia Nails MD     • furosemide  20 mg Oral Daily Lauren Rangel PA-C     • gabapentin  300 mg Oral BID Cordelia Nails MD     • gabapentin  600 mg Oral HS Cordelia Nails MD     • insulin lispro  1-5 Units Subcutaneous TID AC Cordelia Nails MD     • latanoprost  1 drop Both Eyes HS Cordelia Nails MD     • melatonin  3 mg Oral HS PRN Cordelia Nails MD     • propranolol  60 mg Oral Daily Cordelia Nails MD     • senna-docusate sodium  2 tablet Oral HS Cordelia Nails MD     • sertraline  25 mg Oral HS Cordelia Nails MD          Today, Patient Was Seen By: Lizeth Ríos PA-C    ** Please Note: This note has been constructed using a voice recognition system   **

## 2023-04-16 NOTE — PLAN OF CARE
Problem: Potential for Falls  Goal: Patient will remain free of falls  Description: INTERVENTIONS:  - Educate patient/family on patient safety including physical limitations  - Instruct patient to call for assistance with activity   - Consult OT/PT to assist with strengthening/mobility   - Keep Call bell within reach  - Keep bed low and locked with side rails adjusted as appropriate  - Keep care items and personal belongings within reach  - Initiate and maintain comfort rounds  - Make Fall Risk Sign visible to staff  - Offer Toileting every 2 Hours, in advance of need  - Initiate/Maintain bed alarm  - Obtain necessary fall risk management equipment: alarms  - Apply yellow socks and bracelet for high fall risk patients  - Consider moving patient to room near nurses station  Outcome: Progressing     Problem: MOBILITY - ADULT  Goal: Maintain or return to baseline ADL function  Description: INTERVENTIONS:  -  Assess patient's ability to carry out ADLs; assess patient's baseline for ADL function and identify physical deficits which impact ability to perform ADLs (bathing, care of mouth/teeth, toileting, grooming, dressing, etc )  - Assess/evaluate cause of self-care deficits   - Assess range of motion  - Assess patient's mobility; develop plan if impaired  - Assess patient's need for assistive devices and provide as appropriate  - Encourage maximum independence but intervene and supervise when necessary  - Involve family in performance of ADLs  - Assess for home care needs following discharge   - Consider OT consult to assist with ADL evaluation and planning for discharge  - Provide patient education as appropriate  Outcome: Progressing  Goal: Maintains/Returns to pre admission functional level  Description: INTERVENTIONS:  - Perform BMAT or MOVE assessment daily    - Set and communicate daily mobility goal to care team and patient/family/caregiver     - Collaborate with rehabilitation services on mobility goals if consulted  - Reposition patient every 2 hours    -- Record patient progress and toleration of activity level   Outcome: Progressing     Problem: PAIN - ADULT  Goal: Verbalizes/displays adequate comfort level or baseline comfort level  Description: Interventions:  - Encourage patient to monitor pain and request assistance  - Assess pain using appropriate pain scale  - Administer analgesics based on type and severity of pain and evaluate response  - Implement non-pharmacological measures as appropriate and evaluate response  - Consider cultural and social influences on pain and pain management  - Notify physician/advanced practitioner if interventions unsuccessful or patient reports new pain  Outcome: Progressing     Problem: INFECTION - ADULT  Goal: Absence or prevention of progression during hospitalization  Description: INTERVENTIONS:  - Assess and monitor for signs and symptoms of infection  - Monitor lab/diagnostic results  - Monitor all insertion sites, i e  indwelling lines, tubes, and drains  - [place] appropriate cooling/warming therapies per order  - Administer medications as ordered  - Instruct and encourage patient and family to use good hand hygiene technique  - Identify and instruct in appropriate isolation precautions for identified infection/condition  Outcome: Progressing     Problem: SAFETY ADULT  Goal: Patient will remain free of falls  Description: INTERVENTIONS:  - Educate patient/family on patient safety including physical limitations  - Instruct patient to call for assistance with activity   - Consult OT/PT to assist with strengthening/mobility   - Keep Call bell within reach  - Keep bed low and locked with side rails adjusted as appropriate  - Keep care items and personal belongings within reach  - Initiate and maintain comfort rounds  - Make Fall Risk Sign visible to staff  - Offer Toileting every 2 Hours, in advance of need  - Initiate/Maintain bed alarm  - Obtain necessary fall risk management equipment: alarms  - Apply yellow socks and bracelet for high fall risk patients  - Consider moving patient to room near nurses station  Outcome: Progressing  Goal: Maintain or return to baseline ADL function  Description: INTERVENTIONS:  -  Assess patient's ability to carry out ADLs; assess patient's baseline for ADL function and identify physical deficits which impact ability to perform ADLs (bathing, care of mouth/teeth, toileting, grooming, dressing, etc )  - Assess/evaluate cause of self-care deficits   - Assess range of motion  - Assess patient's mobility; develop plan if impaired  - Assess patient's need for assistive devices and provide as appropriate  - Encourage maximum independence but intervene and supervise when necessary  - Involve family in performance of ADLs  - Assess for home care needs following discharge   - Consider OT consult to assist with ADL evaluation and planning for discharge  - Provide patient education as appropriate  Outcome: Progressing  Goal: Maintains/Returns to pre admission functional level  Description: INTERVENTIONS:  - Perform BMAT or MOVE assessment daily    - Set and communicate daily mobility goal to care team and patient/family/caregiver  - Collaborate with rehabilitation services on mobility goals if consulted  - Reposition patient every 2 hours    -- Record patient progress and toleration of activity level   Outcome: Progressing     Problem: DISCHARGE PLANNING  Goal: Discharge to home or other facility with appropriate resources  Description: INTERVENTIONS:  - Identify barriers to discharge w/patient and caregiver  - Arrange for needed discharge resources and transportation as appropriate  - Identify discharge learning needs (meds, wound care, etc )  - Refer to Case Management Department for coordinating discharge planning if the patient needs post-hospital services based on physician/advanced practitioner order or complex needs related to functional status, cognitive ability, or social support system  Outcome: Progressing     Problem: Knowledge Deficit  Goal: Patient/family/caregiver demonstrates understanding of disease process, treatment plan, medications, and discharge instructions  Description: Complete learning assessment and assess knowledge base    Interventions:  - Provide teaching at level of understanding  - Provide teaching via preferred learning methods  Outcome: Progressing     Problem: Prexisting or High Potential for Compromised Skin Integrity  Goal: Skin integrity is maintained or improved  Description: INTERVENTIONS:  - Identify patients at risk for skin breakdown  - Assess and monitor skin integrity  - Assess and monitor nutrition and hydration status  - Monitor labs   - Assess for incontinence   - Turn and reposition patient  - Assist with mobility/ambulation  - Relieve pressure over bony prominences  - Avoid friction and shearing  - Provide appropriate hygiene as needed including keeping skin clean and dry  - Evaluate need for skin moisturizer/barrier cream  - Collaborate with interdisciplinary team   - Patient/family teaching  - Consider wound care consult   Outcome: Progressing     Problem: METABOLIC, FLUID AND ELECTROLYTES - ADULT  Goal: Glucose maintained within target range  Description: INTERVENTIONS:  - Monitor Blood Glucose as ordered  - Assess for signs and symptoms of hyperglycemia and hypoglycemia  - Administer ordered medications to maintain glucose within target range  - Assess nutritional intake and initiate nutrition service referral as needed  Outcome: Progressing

## 2023-04-16 NOTE — ASSESSMENT & PLAN NOTE
Results from last 7 days   Lab Units 04/14/23  0511 04/13/23  0504 04/12/23  0526 04/11/23  1404   SODIUM mmol/L 134* 129* 130* 128*   · Presented with low NA of 126  · Suspecting secondary to dehydration   · Started on IV fluids which improved sodium slowly  · Lasix currently on hold - urine sodium studies skewed since pt was on lasix prior to admission  · Discontinued IV fluids 4/13 given nearing euvolemic status  · Patient now eating and drinking regularly  · Recheck sodium much improved  · Resumed lasix but at lower dose of 20 mg daily today (previously on 40 mg daily)

## 2023-04-16 NOTE — ASSESSMENT & PLAN NOTE
· Continue home sertraline  · Alprazolam 0 25 mg bid prn  · Decreased alprazolam to 0 25 mg daily prn   · Appears patient has not been receiving xanax - this has been discontinued

## 2023-04-17 LAB
GLUCOSE SERPL-MCNC: 137 MG/DL (ref 65–140)
GLUCOSE SERPL-MCNC: 155 MG/DL (ref 65–140)
GLUCOSE SERPL-MCNC: 185 MG/DL (ref 65–140)
GLUCOSE SERPL-MCNC: 216 MG/DL (ref 65–140)

## 2023-04-17 RX ADMIN — INSULIN LISPRO 1 UNITS: 100 INJECTION, SOLUTION INTRAVENOUS; SUBCUTANEOUS at 11:38

## 2023-04-17 RX ADMIN — GABAPENTIN 300 MG: 300 CAPSULE ORAL at 17:41

## 2023-04-17 RX ADMIN — CLOPIDOGREL BISULFATE 75 MG: 75 TABLET ORAL at 08:32

## 2023-04-17 RX ADMIN — INSULIN LISPRO 2 UNITS: 100 INJECTION, SOLUTION INTRAVENOUS; SUBCUTANEOUS at 17:42

## 2023-04-17 RX ADMIN — SENNOSIDES AND DOCUSATE SODIUM 2 TABLET: 8.6; 5 TABLET ORAL at 22:33

## 2023-04-17 RX ADMIN — PROPRANOLOL HYDROCHLORIDE 60 MG: 60 CAPSULE, EXTENDED RELEASE ORAL at 08:32

## 2023-04-17 RX ADMIN — GABAPENTIN 600 MG: 300 CAPSULE ORAL at 22:33

## 2023-04-17 RX ADMIN — LATANOPROST 1 DROP: 50 SOLUTION OPHTHALMIC at 22:36

## 2023-04-17 RX ADMIN — ENOXAPARIN SODIUM 40 MG: 100 INJECTION SUBCUTANEOUS at 08:32

## 2023-04-17 RX ADMIN — SERTRALINE HYDROCHLORIDE 25 MG: 25 TABLET ORAL at 22:33

## 2023-04-17 RX ADMIN — AMLODIPINE BESYLATE 10 MG: 10 TABLET ORAL at 17:41

## 2023-04-17 RX ADMIN — ATORVASTATIN CALCIUM 80 MG: 80 TABLET, FILM COATED ORAL at 17:41

## 2023-04-17 RX ADMIN — GABAPENTIN 300 MG: 300 CAPSULE ORAL at 08:32

## 2023-04-17 RX ADMIN — FUROSEMIDE 20 MG: 20 TABLET ORAL at 08:32

## 2023-04-17 NOTE — PROGRESS NOTES
[address]  Progress Note  Name: Ronaldo Arenas  MRN: [id number]  Unit/Bed#: E4 -01 I Date of Admission: 4/10/2023   Date of Service: 2023 I Hospital Day: 7    Assessment/Plan   * Acute metabolic encephalopathy  Assessment & Plan  · Metabolic encephalopathy which was multifactorial and  secondary to UTI vs xanax vs dehydration vs hyponatremia    He has a chronic dela cruz which makes him prone to UTIs  Xanax stopped  Confusion is better    He was treated with levaquin for UTI due to drug allergies  Chronic diastolic congestive heart failure ([address] )  Assessment & Plan  · Echo from 2018 with EF of 75% and grade 1 diastolic dysfunction  · Maintained on Lasix 40 mg daily at home  · Lasix was held in the setting of dehydration here  · Lowered dosage of Lasix to 20 mg daily upon discharge    Neurogenic bladder  Assessment & Plan  · Chronic suprapubic catheter in place    Multiple sclerosis [facility])  Assessment & Plan  · Patient with history of longstanding multiple sclerosis  · Chronic lower extremity weakness, wheelchair-bound  · On gabapentin 300 mg twice daily and 600 mg at bedtime               Subjective:   Feels better  Less confused  He is considering short term rehab, but is not sure  Objective:     Vitals:   Temp (24hrs), Av 6 °F (36 4 °C), Min:97 6 °F (36 4 °C), Max:97 6 °F (36 4 °C)    Temp:  [97 6 °F (36 4 °C)] 97 6 °F (36 4 °C)  HR:  [67-74] 74  Resp:  [18] 18  BP: (120-138)/(58-67) 120/58  SpO2:  [94 %-95 %] 94 %  Body mass index is 30 88 kg/m²  Input and Output Summary (last 24 hours): Intake/Output Summary (Last 24 hours) at 2023 1001  Last data filed at 2023 0900  Gross per 24 hour   Intake 814 ml   Output 2400 ml   Net -1586 ml       Physical Exam:     Physical Exam  Vitals and nursing note reviewed  Constitutional:       Comments: Oriented to person and place, not time    Thought it was    HENT:      Head: Normocephalic and atraumatic  Eyes:      Pupils: Pupils are equal, round, and reactive to light  Cardiovascular:      Rate and Rhythm: Normal rate and regular rhythm  Heart sounds: No murmur heard  No friction rub  No gallop  Pulmonary:      Effort: Pulmonary effort is normal       Breath sounds: Normal breath sounds  No wheezing or rales  Abdominal:      General: Bowel sounds are normal       Palpations: Abdomen is soft  Tenderness: There is no abdominal tenderness  Musculoskeletal:      Right lower leg: No edema  Left lower leg: No edema          Additional Data:     Labs:    Results from last 7 days   Lab Units 04/12/23  0526 04/11/23  0204   WBC Thousand/uL 7 90 11 53*   HEMOGLOBIN g/dL 13 4 14 4   HEMATOCRIT % 38 8 41 6   PLATELETS Thousands/uL 273 271   NEUTROS PCT %  --  78*   LYMPHS PCT %  --  13*   MONOS PCT %  --  7   EOS PCT %  --  1     Results from last 7 days   Lab Units 04/14/23  0511 04/13/23  0504 04/12/23  0526   POTASSIUM mmol/L 3 6   < > 3 4*   CHLORIDE mmol/L 103   < > 99   CO2 mmol/L 23   < > 21   BUN mg/dL 6   < > 6   CREATININE mg/dL 0 77   < > 0 76   CALCIUM mg/dL 9 3   < > 8 6   ALK PHOS U/L  --   --  72   ALT U/L  --   --  15   AST U/L  --   --  17    < > = values in this interval not displayed  Results from last 7 days   Lab Units 04/17/23  0728 04/16/23  2117 04/16/23  1620 04/16/23  1158 04/16/23  0732 04/15/23  2105 04/15/23  1617 04/15/23  1110 04/15/23  0713 04/14/23  2052 04/14/23  1607 04/14/23  1100   POC GLUCOSE mg/dl 137 175* 152* 189* 141* 174* 153* 172* 127 181* 161* 186*               * I Have Reviewed All Lab Data     Recent Cultures (last 7 days):     Results from last 7 days   Lab Units 04/10/23  1723 04/10/23  1712 04/10/23  1542   BLOOD CULTURE  No Growth After 5 Days  No Growth After 5 Days    --    URINE CULTURE   --   --  >100,000 cfu/ml Escherichia coli*  40,000-49,000 cfu/ml Enterococcus faecalis*         Last 24 Hours Medication List: Current Facility-Administered Medications   Medication Dose Route Frequency Provider Last Rate   • acetaminophen  650 mg Oral Q6H PRN Renetta Qureshi MD     • albuterol  2 5 mg Nebulization Q6H PRN Renetta Qureshi MD     • ALPRAZolam  0 25 mg Oral Daily PRN Rhona Knox PA-C     • amLODIPine  10 mg Oral Daily With Brittany Coronel MD      And   • atorvastatin  80 mg Oral Daily With Brittany Coronel MD     • clopidogrel  75 mg Oral Daily Renetta Qureshi MD     • enoxaparin  40 mg Subcutaneous Daily Renetta Qureshi MD     • furosemide  20 mg Oral Daily Lauren Rangel PA-C     • gabapentin  300 mg Oral BID Renetta Qureshi MD     • gabapentin  600 mg Oral HS Renetta Qureshi MD     • insulin lispro  1-5 Units Subcutaneous TID AC Renetta Qureshi MD     • latanoprost  1 drop Both Eyes HS Renetta Qureshi MD     • melatonin  3 mg Oral HS PRN Renetta Qureshi MD     • propranolol  60 mg Oral Daily Renetta Qureshi MD     • senna-docusate sodium  2 tablet Oral HS Renetta Qureshi MD     • sertraline  25 mg Oral HS Renetta Qureshi MD           VTE Pharmacologic Prophylaxis:   Pharmacologic: Enoxaparin (Lovenox)      Current Length of Stay: 7 day(s)    Current Patient Status: Inpatient       Discharge Plan: looking at STR    Code Status: Level 1 - Full Code           Today, Patient Was Seen By: Jose Luis Wright DO    ** Please Note: Dictation voice to text software may have been used in the creation of this document   **

## 2023-04-17 NOTE — UTILIZATION REVIEW
Continued Stay Review    Date: 4/15, 4/16, 4/17/23                         Current Patient Class: IP  Current Level of Care: MS    HPI:72 y o  male initially admitted on 4/10    Assessment/Plan:     4/15  - sugars stable  Pt is feeling ok, w/o any acute changes  He is resting comfortably and agreeable to rehab post d/c       4/16 - continues with suprapubic cath in place  Sugar stable  No complaints  VS stable  4/17 - no deficits on exam  Will be on lower Lasix dosage at d/c 20 mg daily  Less confusion today  Considering rehab       Vital Signs:   04/17/23 0700 -- 74 18 120/58 83 94 % None (Room air) Lying   04/16/23 2332 -- 67 18 138/67 95 95 % None (Room air) Lying   04/16/23 1524 97 6 °F (36 4 °C) 67 18 125/59 -- 94 % None (Room air) Lying   04/16/23 0808 97 6 °F (36 4 °C) 77 18 128/67 88 92 % None (Room air) Sitting   04/15/23 2103 98 8 °F (37 1 °C) 73 18 133/63 90 95 % None (Room air) Lying   04/15/23 1525 97 8 °F (36 6 °C) 77 18 134/63 91 95 % None (Room air) Lying   04/15/23 0755 97 8 °F (36 6 °C) 83 18 114/59 79 94 % None (Room air) Sitting   04/15/23 0008 98 °F (36 7 °C) 79 18 132/60 87 93 % None (Room air) Lying     Pertinent Labs/Diagnostic Results:       Results from last 7 days   Lab Units 04/12/23  0526 04/11/23  0204 04/10/23  2041   WBC Thousand/uL 7 90 11 53*  --    HEMOGLOBIN g/dL 13 4 14 4  --    HEMATOCRIT % 38 8 41 6  --    PLATELETS Thousands/uL 273 271 290   NEUTROS ABS Thousands/µL  --  8 97*  --          Results from last 7 days   Lab Units 04/14/23  0511 04/13/23  0504 04/12/23  0526 04/11/23  1404 04/11/23  0808   SODIUM mmol/L 134* 129* 130* 128* 128*   POTASSIUM mmol/L 3 6 3 7 3 4* 3 8 3 6   CHLORIDE mmol/L 103 100 99 97 96   CO2 mmol/L 23 20* 21 22 22   ANION GAP mmol/L 8 9 10 9 10   BUN mg/dL 6 8 6 9 11   CREATININE mg/dL 0 77 0 70 0 76 0 82 0 82   EGFR ml/min/1 73sq m 90 93 90 87 87   CALCIUM mg/dL 9 3 8 8 8 6 8 8 9 2     Results from last 7 days   Lab Units 04/12/23  0543 04/11/23  0204   AST U/L 17 13   ALT U/L 15 12   ALK PHOS U/L 72 80   TOTAL PROTEIN g/dL 6 3* 7 3   ALBUMIN g/dL 3 1* 3 6   TOTAL BILIRUBIN mg/dL 0 57 0 88     Results from last 7 days   Lab Units 04/17/23  1112 04/17/23  0728 04/16/23  2117 04/16/23  1620 04/16/23  1158 04/16/23  0732 04/15/23  2105 04/15/23  1617 04/15/23  1110 04/15/23  0713 04/14/23  2052 04/14/23  1607   POC GLUCOSE mg/dl 185* 137 175* 152* 189* 141* 174* 153* 172* 127 181* 161*     Results from last 7 days   Lab Units 04/14/23  0511 04/13/23  0504 04/12/23  0526 04/11/23  1404 04/11/23  0808 04/11/23  0204 04/10/23  2041   GLUCOSE RANDOM mg/dL 119 131 110 164* 125 120  120 156*     Results from last 7 days   Lab Units 04/10/23  2041   OSMOLALITY, SERUM mmol/*     Results from last 7 days   Lab Units 04/10/23  2041   TSH 3RD GENERATON uIU/mL 2 863     Results from last 7 days   Lab Units 04/12/23  0526 04/10/23  1712   PROCALCITONIN ng/ml 0 37* 0 47*     Results from last 7 days   Lab Units 04/10/23  1712   LACTIC ACID mmol/L 1 3     Results from last 7 days   Lab Units 04/10/23  2227 04/10/23  2041   OSMOLALITY, SERUM mmol/KG  --  278*   OSMO UR mmol/  --      Results from last 7 days   Lab Units 04/10/23  2023 04/10/23  1542   CLARITY UA   --  Cloudy   COLOR UA   --  Yellow   SPEC GRAV UA   --  1 025   PH UA   --  6 0   GLUCOSE UA mg/dl  --  250 (1/4%)*   KETONES UA mg/dl  --  40 (2+)*   BLOOD UA   --  Moderate*   PROTEIN UA mg/dl  --  30 (1+)*   NITRITE UA   --  Positive*   BILIRUBIN UA   --  Negative   UROBILINOGEN UA E U /dl  --  0 2   LEUKOCYTES UA   --  Moderate*   WBC UA /hpf  --  Innumerable*   RBC UA /hpf  --  10-20*   BACTERIA UA /hpf  --  Innumerable*   EPITHELIAL CELLS WET PREP /hpf  --  None Seen   SODIUM UR  21  --      Results from last 7 days   Lab Units 04/10/23  1723 04/10/23  1712 04/10/23  1542   BLOOD CULTURE  No Growth After 5 Days  No Growth After 5 Days    --    URINE CULTURE   --   --  >100,000 cfu/ml Escherichia coli*  40,000-49,000 cfu/ml Enterococcus faecalis*       Medications:   Scheduled Medications:  amLODIPine, 10 mg, Oral, Daily With Dinner   And  atorvastatin, 80 mg, Oral, Daily With Dinner  clopidogrel, 75 mg, Oral, Daily  enoxaparin, 40 mg, Subcutaneous, Daily  furosemide, 20 mg, Oral, Daily  gabapentin, 300 mg, Oral, BID  gabapentin, 600 mg, Oral, HS  insulin lispro, 1-5 Units, Subcutaneous, TID AC  latanoprost, 1 drop, Both Eyes, HS  propranolol, 60 mg, Oral, Daily  senna-docusate sodium, 2 tablet, Oral, HS  sertraline, 25 mg, Oral, HS      Continuous IV Infusions:     PRN Meds:  acetaminophen, 650 mg, Oral, Q6H PRN  albuterol, 2 5 mg, Nebulization, Q6H PRN  ALPRAZolam, 0 25 mg, Oral, Daily PRN  melatonin, 3 mg, Oral, HS PRN    Discharge Plan: D    Network Utilization Review Department  ATTENTION: Please call with any questions or concerns to [phone] and carefully listen to the prompts so that you are directed to the right person  All voicemails are confidential   Anna Ojeda all requests for admission clinical reviews, approved or denied determinations and any other requests to dedicated fax number below belonging to the campus where the patient is receiving treatment   List of dedicated fax numbers for the Facilities:  [address] DENIALS (Administrative/Medical Necessity) [phone]   [address] (Maternity/NICU/Pediatrics) [phone]   [address] Sarah Juárez [phone]   [facility] [place] [address] [phone]   [address] [place] [address] [id number] [place] [address] [address]   [address] [place] [phone]   [facility] [address] [facility] [phone]

## 2023-04-17 NOTE — PLAN OF CARE
Problem: OCCUPATIONAL THERAPY ADULT  Goal: Performs self-care activities at highest level of function for planned discharge setting  See evaluation for individualized goals  Description: Treatment Interventions: ADL retraining, Functional transfer training, UE strengthening/ROM, Endurance training, Patient/family training, Equipment evaluation/education, Compensatory technique education, Continued evaluation, Energy conservation, Activityengagement          See flowsheet documentation for full assessment, interventions and recommendations  Outcome: Progressing  Note: Limitation: Decreased ADL status, Decreased UE strength, Decreased Safe judgement during ADL, Decreased cognition, Decreased endurance, Decreased self-care trans, Decreased high-level ADLs  Prognosis: Fair  Assessment: Pt seen for OT tx session w/ focus on bed mobility, balance, transfers, education, and activity tolerance  Denies pain  Improved rolling and bed mobility  Able to transfer into [facility] w/ assist x 2 this session  Tolerated EOB x 15 mins w/ F-/P+ balance  Tends to retropulse  Able to manage WC mobility  Assist needed for LB dressing, which is baseline  Engaged in trunk strengthening and core balance tasks  Pt making progress toward goals  States he's had MS since he was 11 and knows how to manage at home w/ his family  Recommend STR vs Home w/ therapy pending family support and supervision       OT Discharge Recommendation: Post acute rehabilitation services (STR vs Home w/ therapy pending family support )

## 2023-04-17 NOTE — ASSESSMENT & PLAN NOTE
· Metabolic encephalopathy which was multifactorial and  secondary to UTI vs xanax vs dehydration vs hyponatremia    He has a chronic edla cruz which makes him prone to UTIs  Xanax stopped  Confusion is better    He was treated with levaquin for UTI due to drug allergies  1

## 2023-04-17 NOTE — PHYSICAL THERAPY NOTE
Physical Therapy Treatment Note     04/17/23 1229   PT Last Visit   PT Visit Date 04/17/23   Note Type   Note Type Treatment   Pain Assessment   Pain Assessment Tool 0-10   Pain Score No Pain   Restrictions/Precautions   Weight Bearing Precautions Per Order No   Other Precautions Bed Alarm; Fall Risk;Visual impairment;Contact/isolation   General   Chart Reviewed Yes   Family/Caregiver Present No   Subjective   Subjective Pt  agreeable to PT   Bed Mobility   Rolling R 4  Minimal assistance   Additional items Assist x 1;Bedrails; Increased time required;Verbal cues;LE management   Rolling L 4  Minimal assistance   Additional items Assist x 1; Increased time required; Bedrails;Verbal cues;LE management   Supine to Sit 5  Supervision   Additional items Assist x 1;HOB elevated; Bedrails; Increased time required   Sit to Supine 4  Minimal assistance   Additional items Assist x 1;LE management; Increased time required;Verbal cues; Bedrails   Transfers   Sit to Stand 3  Moderate assistance   Additional items Assist x 2; Increased time required;Verbal cues; Bedrails;Armrests   Stand to Sit 3  Moderate assistance   Additional items Assist x 2; Increased time required;Verbal cues;Armrests; Bedrails   Sit pivot 3  Moderate assistance   Additional items Assist x 2; Increased time required;Verbal cues   Wheelchair Activities   Wheelchair Cushion None   Wheelchair Parts Management Yes   Propulsion Yes   Propulsion Type 1 Manual   Level 1 Level tile   Method 1 Right upper extremity; Left upper extremity   Level of Assistance 1 Distant supervision   Description/ Details 1 30ft forward propulsion and turns with S   Balance   Static Sitting Poor +   Dynamic Sitting Poor   Static Standing Poor   Dynamic Standing Poor -   Ambulatory Zero   Endurance Deficit   Endurance Deficit No   Activity Tolerance   Activity Tolerance Patient tolerated treatment well   Nurse Made Aware Yes   Assessment   Prognosis Fair   Problem List Impaired balance;Decreased mobility; Decreased coordination; Impaired tone   Assessment Pt  tolerated all therapeutic activities of trasnfers  All transfers from in and out bed made to the R which is what patient does at home  Pt  able to use the bed controls independently and trasnferred from supine to sit position with S  However patient was given Min A for LE management for sit to supine trasnfer  Pt  noted with impaired balance when seated at the EOB and needed cues and Min A to maintain upright position when at EOB  pt  able to perform reaching with BUEs in different directions crossing midline and at different heights  Pt  also performed trunk movement to the center from posterior lean  Pt  noted with posterior push/retropulsion while seated at EOB and decreased activation and facilitation of abdominal and back muscles  Increased weakness noted on the L side  Pt  able to negotiate W/C with BUEs and S  Pt  positioned back in bed with all needs within reach and bed alarm engaged  Will continue to follow per PT POC to improve the mobility and functional independence  Pt  reported he wants to go home and is almost at his baseline functioning and his brother and sister will be able ti help  Reported he has had MS since he was 11 YOA   Continue skilled PT per POC   Barriers to Discharge None   Goals   Patient Goals Go home   STG Expiration Date 04/23/23   PT Treatment Day 2   Plan   Treatment/Interventions Functional transfer training;Bed mobility;Spoke to nursing;OT   Progress Progressing toward goals   PT Frequency 3-5x/wk   Recommendation   PT Discharge Recommendation   (Home with family if they are able to continue assistance vs rehab)   Equipment Recommended Other (Comment)  (Pt  has all equiment)   AM-PAC Basic Mobility Inpatient   Turning in Flat Bed Without Bedrails 1   Lying on Back to Sitting on Edge of Flat Bed Without Bedrails 1   Moving Bed to Chair 2   Standing Up From Chair Using Arms 2   Walk in Room 1   Climb 3-5 Stairs With Railing 1   Basic Mobility Inpatient Raw Score 8   Turning Head Towards Sound 4   Follow Simple Instructions 4   Low Function Basic Mobility Raw Score  16   Low Function Basic Mobility Standardized Score  25 72   Highest Level Of Mobility   -HLM Goal 3: Sit at edge of bed   -HLM Achieved 4: Move to chair/commode   End of Consult   Patient Position at End of Consult Bed/Chair alarm activated;Supine; All needs within reach         Kaycee Ward PTA    An AM-PAC basic mobility standardized score less than 42 9 suggest the patient may benefit from discharge to post-acute rehab services

## 2023-04-17 NOTE — OCCUPATIONAL THERAPY NOTE
Occupational Therapy Progress Note     Patient Name: Elton Najjar KRYBU'B Date: 4/17/2023  Problem List  Principal Problem:    Acute metabolic encephalopathy  Active Problems:    Confusion    Depression    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HCC)    Hypertension    Multiple sclerosis ([facility] )    Neurogenic bladder    Diabetes mellitus ([facility] )    History of CVA (cerebrovascular accident)    Hyponatremia    Chronic diastolic congestive heart failure ([facility] )        04/17/23 1230   OT Last Visit   OT Visit Date 04/17/23   Note Type   Note Type Treatment   Pain Assessment   Pain Assessment Tool 0-10   Restrictions/Precautions   Weight Bearing Precautions Per Order No   Other Precautions Chair Alarm; Bed Alarm;Contact/isolation; Fall Risk   ADL   LB Dressing Assistance 2  Maximal Assistance   LB Dressing Deficit Don/doff R sock; Don/doff L sock; Don/doff R shoe;Don/doff L shoe   Bed Mobility   Rolling R 4  Minimal assistance   Additional items Assist x 1;Bedrails; Increased time required   Rolling L 4  Minimal assistance   Additional items Assist x 1;Bedrails; Increased time required   Supine to Sit 5  Supervision   Additional items HOB elevated; Bedrails; Increased time required   Sit to Supine 4  Minimal assistance   Additional items Bedrails; Increased time required;Verbal cues;LE management   Additional Comments Pt controlled own bed controls during supine to sit like he would at home  Sat EOB x 15 mins w/ P+/F- balance w/ support  Cues to center self and not retropulse  Returned to bed at end of session w/ all needs and alarm   Transfers   Sit to Stand 3  Moderate assistance   Additional items Assist x 2; Increased time required;Verbal cues   Stand to Sit 3  Moderate assistance   Additional items Assist x 2; Increased time required;Verbal cues   Additional Comments All transfers to R side like he does at home  Bed > WC w/ Mod x 2  WC > Bed w/ Min x 2     Functional Mobility   Additional Comments Attempted WC mobility while supporting "his LE's w/ sheet  However, pt relied on therapist's momentum instead of self propulsion  Able to manage WC mobility regardless  Subjective   Subjective \" I've been doing this for 62 years  I know how to manage myself with the help of my family  \"   Cognition   Arousal/Participation Alert; Cooperative   Attention Within functional limits   Orientation Level Oriented X4   Memory Decreased recall of precautions   Following Commands Follows one step commands without difficulty   Comments Pleasant and cooperative  Additional Activities   Additional Activities Comments Trunk exercises/core balance with and without UE support  Tends to retropulse  Activity Tolerance   Activity Tolerance Patient tolerated treatment well   Medical Staff Made Aware PTA Jiji   Assessment   Assessment Pt seen for OT tx session w/ focus on bed mobility, balance, transfers, education, and activity tolerance  Denies pain  Improved rolling and bed mobility  Able to transfer into [facility] w/ assist x 2 this session  Tolerated EOB x 15 mins w/ F-/P+ balance  Tends to retropulse  Able to manage WC mobility  Assist needed for LB dressing, which is baseline  Engaged in trunk strengthening and core balance tasks  Pt making progress toward goals  States he's had MS since he was 11 and knows how to manage at home w/ his family  Recommend STR vs Home w/ therapy pending family support and supervision  Plan   Treatment Interventions ADL retraining;Functional transfer training;UE strengthening/ROM; Endurance training;Patient/family training;Equipment evaluation/education; Neuromuscular reeducation; Activityengagement   Goal Expiration Date 04/27/23   OT Treatment Day 2   OT Frequency 3-5x/wk   Recommendation   OT Discharge Recommendation Post acute rehabilitation services  (STR vs Home w/ therapy pending family support )   Additional Comments  Pt reports his family assists him and he's able to manage in his own environment/set up     AM-PAC Daily Activity " Inpatient   Lower Body Dressing 2   Bathing 3   Toileting 2   Upper Body Dressing 3   Grooming 3   Eating 4   Daily Activity Raw Score 17   Daily Activity Standardized Score (Calc for Raw Score >=11) 37 26   AM-PAC Applied Cognition Inpatient   Following a Speech/Presentation 4   Understanding Ordinary Conversation 4   Taking Medications 2   Remembering Where Things Are Placed or Put Away 3   Remembering List of 4-5 Errands 3   Taking Care of Complicated Tasks 2   Applied Cognition Raw Score 18   Applied Cognition Standardized Score 38 07       Yunier Shrestha MS, OTR/L

## 2023-04-17 NOTE — PLAN OF CARE
Problem: Potential for Falls  Goal: Patient will remain free of falls  Description: INTERVENTIONS:  - Educate patient/family on patient safety including physical limitations  - Instruct patient to call for assistance with activity   - Consult OT/PT to assist with strengthening/mobility   - Keep Call bell within reach  - Keep bed low and locked with side rails adjusted as appropriate  - Keep care items and personal belongings within reach  - Initiate and maintain comfort rounds  - Make Fall Risk Sign visible to staff  - Offer Toileting every 2 Hours, in advance of need  - Initiate/Maintain bed alarm  - Obtain necessary fall risk management equipment: alarms  - Apply yellow socks and bracelet for high fall risk patients  - Consider moving patient to room near nurses station  Outcome: Progressing     Problem: MOBILITY - ADULT  Goal: Maintain or return to baseline ADL function  Description: INTERVENTIONS:  -  Assess patient's ability to carry out ADLs; assess patient's baseline for ADL function and identify physical deficits which impact ability to perform ADLs (bathing, care of mouth/teeth, toileting, grooming, dressing, etc )  - Assess/evaluate cause of self-care deficits   - Assess range of motion  - Assess patient's mobility; develop plan if impaired  - Assess patient's need for assistive devices and provide as appropriate  - Encourage maximum independence but intervene and supervise when necessary  - Involve family in performance of ADLs  - Assess for home care needs following discharge   - Consider OT consult to assist with ADL evaluation and planning for discharge  - Provide patient education as appropriate  Outcome: Progressing  Goal: Maintains/Returns to pre admission functional level  Description: INTERVENTIONS:  - Perform BMAT or MOVE assessment daily    - Set and communicate daily mobility goal to care team and patient/family/caregiver     - Collaborate with rehabilitation services on mobility goals if consulted  - Reposition patient every 2 hours    -- Record patient progress and toleration of activity level   Outcome: Progressing     Problem: PAIN - ADULT  Goal: Verbalizes/displays adequate comfort level or baseline comfort level  Description: Interventions:  - Encourage patient to monitor pain and request assistance  - Assess pain using appropriate pain scale  - Administer analgesics based on type and severity of pain and evaluate response  - Implement non-pharmacological measures as appropriate and evaluate response  - Consider cultural and social influences on pain and pain management  - Notify physician/advanced practitioner if interventions unsuccessful or patient reports new pain  Outcome: Progressing     Problem: INFECTION - ADULT  Goal: Absence or prevention of progression during hospitalization  Description: INTERVENTIONS:  - Assess and monitor for signs and symptoms of infection  - Monitor lab/diagnostic results  - Monitor all insertion sites, i e  indwelling lines, tubes, and drains  - [place] appropriate cooling/warming therapies per order  - Administer medications as ordered  - Instruct and encourage patient and family to use good hand hygiene technique  - Identify and instruct in appropriate isolation precautions for identified infection/condition  Outcome: Progressing     Problem: SAFETY ADULT  Goal: Patient will remain free of falls  Description: INTERVENTIONS:  - Educate patient/family on patient safety including physical limitations  - Instruct patient to call for assistance with activity   - Consult OT/PT to assist with strengthening/mobility   - Keep Call bell within reach  - Keep bed low and locked with side rails adjusted as appropriate  - Keep care items and personal belongings within reach  - Initiate and maintain comfort rounds  - Make Fall Risk Sign visible to staff  - Offer Toileting every 2 Hours, in advance of need  - Initiate/Maintain bed alarm  - Obtain necessary fall risk management equipment: alarms  - Apply yellow socks and bracelet for high fall risk patients  - Consider moving patient to room near nurses station  Outcome: Progressing  Goal: Maintain or return to baseline ADL function  Description: INTERVENTIONS:  -  Assess patient's ability to carry out ADLs; assess patient's baseline for ADL function and identify physical deficits which impact ability to perform ADLs (bathing, care of mouth/teeth, toileting, grooming, dressing, etc )  - Assess/evaluate cause of self-care deficits   - Assess range of motion  - Assess patient's mobility; develop plan if impaired  - Assess patient's need for assistive devices and provide as appropriate  - Encourage maximum independence but intervene and supervise when necessary  - Involve family in performance of ADLs  - Assess for home care needs following discharge   - Consider OT consult to assist with ADL evaluation and planning for discharge  - Provide patient education as appropriate  Outcome: Progressing  Goal: Maintains/Returns to pre admission functional level  Description: INTERVENTIONS:  - Perform BMAT or MOVE assessment daily    - Set and communicate daily mobility goal to care team and patient/family/caregiver  - Collaborate with rehabilitation services on mobility goals if consulted  - Reposition patient every 2 hours    -- Record patient progress and toleration of activity level   Outcome: Progressing     Problem: DISCHARGE PLANNING  Goal: Discharge to home or other facility with appropriate resources  Description: INTERVENTIONS:  - Identify barriers to discharge w/patient and caregiver  - Arrange for needed discharge resources and transportation as appropriate  - Identify discharge learning needs (meds, wound care, etc )  - Refer to Case Management Department for coordinating discharge planning if the patient needs post-hospital services based on physician/advanced practitioner order or complex needs related to functional status, cognitive ability, or social support system  Outcome: Progressing     Problem: Knowledge Deficit  Goal: Patient/family/caregiver demonstrates understanding of disease process, treatment plan, medications, and discharge instructions  Description: Complete learning assessment and assess knowledge base    Interventions:  - Provide teaching at level of understanding  - Provide teaching via preferred learning methods  Outcome: Progressing     Problem: Prexisting or High Potential for Compromised Skin Integrity  Goal: Skin integrity is maintained or improved  Description: INTERVENTIONS:  - Identify patients at risk for skin breakdown  - Assess and monitor skin integrity  - Assess and monitor nutrition and hydration status  - Monitor labs   - Assess for incontinence   - Turn and reposition patient  - Assist with mobility/ambulation  - Relieve pressure over bony prominences  - Avoid friction and shearing  - Provide appropriate hygiene as needed including keeping skin clean and dry  - Evaluate need for skin moisturizer/barrier cream  - Collaborate with interdisciplinary team   - Patient/family teaching  - Consider wound care consult   Outcome: Progressing     Problem: METABOLIC, FLUID AND ELECTROLYTES - ADULT  Goal: Glucose maintained within target range  Description: INTERVENTIONS:  - Monitor Blood Glucose as ordered  - Assess for signs and symptoms of hyperglycemia and hypoglycemia  - Administer ordered medications to maintain glucose within target range  - Assess nutritional intake and initiate nutrition service referral as needed  Outcome: Progressing

## 2023-04-17 NOTE — PLAN OF CARE
Problem: PHYSICAL THERAPY ADULT  Goal: Performs mobility at highest level of function for planned discharge setting  See evaluation for individualized goals  Description: Treatment/Interventions: Functional transfer training, LE strengthening/ROM, Therapeutic exercise, Endurance training, Patient/family training, Bed mobility, Spoke to nursing, OT          See flowsheet documentation for full assessment, interventions and recommendations  Outcome: Progressing  Note: Prognosis: Fair  Problem List: Impaired balance, Decreased mobility, Decreased coordination, Impaired tone  Assessment: Pt  tolerated all therapeutic activities of trasnfers  All transfers from in and out bed made to the R which is what patient does at home  Pt  able to use the bed controls independently and trasnferred from supine to sit position with S  However patient was given Min A for LE management for sit to supine trasnfer  Pt  noted with impaired balance when seated at the EOB and needed cues and Min A to maintain upright position when at EOB  pt  able to perform reaching with BUEs in different directions crossing midline and at different heights  Pt  also performed trunk movement to the center from posterior lean  Pt  noted with posterior push/retropulsion while seated at EOB and decreased activation and facilitation of abdominal and back muscles  Increased weakness noted on the L side  Pt  able to negotiate W/C with BUEs and S  Pt  positioned back in bed with all needs within reach and bed alarm engaged  Will continue to follow per PT POC to improve the mobility and functional independence  Pt  reported he wants to go home and is almost at his baseline functioning and his brother and sister will be able ti help  Reported he has had MS since he was 11 YOA   Continue skilled PT per POC  Barriers to Discharge: None     PT Discharge Recommendation:  (Home with family if they are able to continue assistance vs rehab)    See flowsheet documentation for full assessment

## 2023-04-17 NOTE — PLAN OF CARE
Problem: Potential for Falls  Goal: Patient will remain free of falls  Description: INTERVENTIONS:  - Educate patient/family on patient safety including physical limitations  - Instruct patient to call for assistance with activity   - Consult OT/PT to assist with strengthening/mobility   - Keep Call bell within reach  - Keep bed low and locked with side rails adjusted as appropriate  - Keep care items and personal belongings within reach  - Initiate and maintain comfort rounds  - Make Fall Risk Sign visible to staff  - Offer Toileting every 2 Hours, in advance of need  - Initiate/Maintain bed alarm  - Obtain necessary fall risk management equipment: alarms  - Apply yellow socks and bracelet for high fall risk patients  - Consider moving patient to room near nurses station  Outcome: Progressing     Problem: MOBILITY - ADULT  Goal: Maintain or return to baseline ADL function  Description: INTERVENTIONS:  -  Assess patient's ability to carry out ADLs; assess patient's baseline for ADL function and identify physical deficits which impact ability to perform ADLs (bathing, care of mouth/teeth, toileting, grooming, dressing, etc )  - Assess/evaluate cause of self-care deficits   - Assess range of motion  - Assess patient's mobility; develop plan if impaired  - Assess patient's need for assistive devices and provide as appropriate  - Encourage maximum independence but intervene and supervise when necessary  - Involve family in performance of ADLs  - Assess for home care needs following discharge   - Consider OT consult to assist with ADL evaluation and planning for discharge  - Provide patient education as appropriate  Outcome: Progressing  Goal: Maintains/Returns to pre admission functional level  Description: INTERVENTIONS:  - Perform BMAT or MOVE assessment daily    - Set and communicate daily mobility goal to care team and patient/family/caregiver     - Collaborate with rehabilitation services on mobility goals if consulted  - Reposition patient every 2 hours    -- Record patient progress and toleration of activity level   Outcome: Progressing     Problem: PAIN - ADULT  Goal: Verbalizes/displays adequate comfort level or baseline comfort level  Description: Interventions:  - Encourage patient to monitor pain and request assistance  - Assess pain using appropriate pain scale  - Administer analgesics based on type and severity of pain and evaluate response  - Implement non-pharmacological measures as appropriate and evaluate response  - Consider cultural and social influences on pain and pain management  - Notify physician/advanced practitioner if interventions unsuccessful or patient reports new pain  Outcome: Progressing     Problem: INFECTION - ADULT  Goal: Absence or prevention of progression during hospitalization  Description: INTERVENTIONS:  - Assess and monitor for signs and symptoms of infection  - Monitor lab/diagnostic results  - Monitor all insertion sites, i e  indwelling lines, tubes, and drains  - [place] appropriate cooling/warming therapies per order  - Administer medications as ordered  - Instruct and encourage patient and family to use good hand hygiene technique  - Identify and instruct in appropriate isolation precautions for identified infection/condition  Outcome: Progressing     Problem: SAFETY ADULT  Goal: Patient will remain free of falls  Description: INTERVENTIONS:  - Educate patient/family on patient safety including physical limitations  - Instruct patient to call for assistance with activity   - Consult OT/PT to assist with strengthening/mobility   - Keep Call bell within reach  - Keep bed low and locked with side rails adjusted as appropriate  - Keep care items and personal belongings within reach  - Initiate and maintain comfort rounds  - Make Fall Risk Sign visible to staff  - Offer Toileting every 2 Hours, in advance of need  - Initiate/Maintain bed alarm  - Obtain necessary fall risk management equipment: alarms  - Apply yellow socks and bracelet for high fall risk patients  - Consider moving patient to room near nurses station  Outcome: Progressing  Goal: Maintain or return to baseline ADL function  Description: INTERVENTIONS:  -  Assess patient's ability to carry out ADLs; assess patient's baseline for ADL function and identify physical deficits which impact ability to perform ADLs (bathing, care of mouth/teeth, toileting, grooming, dressing, etc )  - Assess/evaluate cause of self-care deficits   - Assess range of motion  - Assess patient's mobility; develop plan if impaired  - Assess patient's need for assistive devices and provide as appropriate  - Encourage maximum independence but intervene and supervise when necessary  - Involve family in performance of ADLs  - Assess for home care needs following discharge   - Consider OT consult to assist with ADL evaluation and planning for discharge  - Provide patient education as appropriate  Outcome: Progressing  Goal: Maintains/Returns to pre admission functional level  Description: INTERVENTIONS:  - Perform BMAT or MOVE assessment daily    - Set and communicate daily mobility goal to care team and patient/family/caregiver  - Collaborate with rehabilitation services on mobility goals if consulted  - Reposition patient every 2 hours    -- Record patient progress and toleration of activity level   Outcome: Progressing     Problem: DISCHARGE PLANNING  Goal: Discharge to home or other facility with appropriate resources  Description: INTERVENTIONS:  - Identify barriers to discharge w/patient and caregiver  - Arrange for needed discharge resources and transportation as appropriate  - Identify discharge learning needs (meds, wound care, etc )  - Refer to Case Management Department for coordinating discharge planning if the patient needs post-hospital services based on physician/advanced practitioner order or complex needs related to functional status, cognitive ability, or social support system  Outcome: Progressing     Problem: Knowledge Deficit  Goal: Patient/family/caregiver demonstrates understanding of disease process, treatment plan, medications, and discharge instructions  Description: Complete learning assessment and assess knowledge base    Interventions:  - Provide teaching at level of understanding  - Provide teaching via preferred learning methods  Outcome: Progressing     Problem: Prexisting or High Potential for Compromised Skin Integrity  Goal: Skin integrity is maintained or improved  Description: INTERVENTIONS:  - Identify patients at risk for skin breakdown  - Assess and monitor skin integrity  - Assess and monitor nutrition and hydration status  - Monitor labs   - Assess for incontinence   - Turn and reposition patient  - Assist with mobility/ambulation  - Relieve pressure over bony prominences  - Avoid friction and shearing  - Provide appropriate hygiene as needed including keeping skin clean and dry  - Evaluate need for skin moisturizer/barrier cream  - Collaborate with interdisciplinary team   - Patient/family teaching  - Consider wound care consult   Outcome: Progressing     Problem: METABOLIC, FLUID AND ELECTROLYTES - ADULT  Goal: Glucose maintained within target range  Description: INTERVENTIONS:  - Monitor Blood Glucose as ordered  - Assess for signs and symptoms of hyperglycemia and hypoglycemia  - Administer ordered medications to maintain glucose within target range  - Assess nutritional intake and initiate nutrition service referral as needed  Outcome: Progressing

## 2023-04-18 VITALS
RESPIRATION RATE: 17 BRPM | HEIGHT: 64 IN | BODY MASS INDEX: 30.71 KG/M2 | TEMPERATURE: 96.9 F | SYSTOLIC BLOOD PRESSURE: 119 MMHG | WEIGHT: 179.9 LBS | DIASTOLIC BLOOD PRESSURE: 73 MMHG | OXYGEN SATURATION: 93 % | HEART RATE: 73 BPM

## 2023-04-18 LAB
ANION GAP SERPL CALCULATED.3IONS-SCNC: 8 MMOL/L (ref 4–13)
BASOPHILS # BLD AUTO: 0.14 THOUSANDS/ΜL (ref 0–0.1)
BASOPHILS NFR BLD AUTO: 1 % (ref 0–1)
BUN SERPL-MCNC: 14 MG/DL (ref 5–25)
CALCIUM SERPL-MCNC: 9.8 MG/DL (ref 8.4–10.2)
CHLORIDE SERPL-SCNC: 99 MMOL/L (ref 96–108)
CO2 SERPL-SCNC: 24 MMOL/L (ref 21–32)
CREAT SERPL-MCNC: 0.82 MG/DL (ref 0.6–1.3)
EOSINOPHIL # BLD AUTO: 0.72 THOUSAND/ΜL (ref 0–0.61)
EOSINOPHIL NFR BLD AUTO: 5 % (ref 0–6)
ERYTHROCYTE [DISTWIDTH] IN BLOOD BY AUTOMATED COUNT: 13 % (ref 11.6–15.1)
GFR SERPL CREATININE-BSD FRML MDRD: 87 ML/MIN/1.73SQ M
GLUCOSE SERPL-MCNC: 168 MG/DL (ref 65–140)
GLUCOSE SERPL-MCNC: 177 MG/DL (ref 65–140)
GLUCOSE SERPL-MCNC: 181 MG/DL (ref 65–140)
GLUCOSE SERPL-MCNC: 182 MG/DL (ref 65–140)
HCT VFR BLD AUTO: 43.9 % (ref 36.5–49.3)
HGB BLD-MCNC: 14.8 G/DL (ref 12–17)
IMM GRANULOCYTES # BLD AUTO: 0.12 THOUSAND/UL (ref 0–0.2)
IMM GRANULOCYTES NFR BLD AUTO: 1 % (ref 0–2)
LYMPHOCYTES # BLD AUTO: 3.87 THOUSANDS/ΜL (ref 0.6–4.47)
LYMPHOCYTES NFR BLD AUTO: 28 % (ref 14–44)
MCH RBC QN AUTO: 29 PG (ref 26.8–34.3)
MCHC RBC AUTO-ENTMCNC: 33.7 G/DL (ref 31.4–37.4)
MCV RBC AUTO: 86 FL (ref 82–98)
MONOCYTES # BLD AUTO: 0.89 THOUSAND/ΜL (ref 0.17–1.22)
MONOCYTES NFR BLD AUTO: 6 % (ref 4–12)
NEUTROPHILS # BLD AUTO: 8.16 THOUSANDS/ΜL (ref 1.85–7.62)
NEUTS SEG NFR BLD AUTO: 59 % (ref 43–75)
NRBC BLD AUTO-RTO: 0 /100 WBCS
PLATELET # BLD AUTO: 498 THOUSANDS/UL (ref 149–390)
PMV BLD AUTO: 7.8 FL (ref 8.9–12.7)
POTASSIUM SERPL-SCNC: 4.1 MMOL/L (ref 3.5–5.3)
RBC # BLD AUTO: 5.11 MILLION/UL (ref 3.88–5.62)
SODIUM SERPL-SCNC: 131 MMOL/L (ref 135–147)
WBC # BLD AUTO: 13.9 THOUSAND/UL (ref 4.31–10.16)

## 2023-04-18 RX ADMIN — FUROSEMIDE 20 MG: 20 TABLET ORAL at 09:36

## 2023-04-18 RX ADMIN — INSULIN LISPRO 1 UNITS: 100 INJECTION, SOLUTION INTRAVENOUS; SUBCUTANEOUS at 13:15

## 2023-04-18 RX ADMIN — ATORVASTATIN CALCIUM 80 MG: 80 TABLET, FILM COATED ORAL at 17:36

## 2023-04-18 RX ADMIN — ENOXAPARIN SODIUM 40 MG: 100 INJECTION SUBCUTANEOUS at 09:37

## 2023-04-18 RX ADMIN — INSULIN LISPRO 1 UNITS: 100 INJECTION, SOLUTION INTRAVENOUS; SUBCUTANEOUS at 09:38

## 2023-04-18 RX ADMIN — CLOPIDOGREL BISULFATE 75 MG: 75 TABLET ORAL at 09:36

## 2023-04-18 RX ADMIN — PROPRANOLOL HYDROCHLORIDE 60 MG: 60 CAPSULE, EXTENDED RELEASE ORAL at 09:37

## 2023-04-18 RX ADMIN — INSULIN LISPRO 1 UNITS: 100 INJECTION, SOLUTION INTRAVENOUS; SUBCUTANEOUS at 17:39

## 2023-04-18 RX ADMIN — AMLODIPINE BESYLATE 10 MG: 10 TABLET ORAL at 17:35

## 2023-04-18 RX ADMIN — GABAPENTIN 300 MG: 300 CAPSULE ORAL at 17:35

## 2023-04-18 RX ADMIN — GABAPENTIN 300 MG: 300 CAPSULE ORAL at 09:37

## 2023-04-18 NOTE — PLAN OF CARE
Problem: Potential for Falls  Goal: Patient will remain free of falls  Description: INTERVENTIONS:  - Educate patient/family on patient safety including physical limitations  - Instruct patient to call for assistance with activity   - Consult OT/PT to assist with strengthening/mobility   - Keep Call bell within reach  - Keep bed low and locked with side rails adjusted as appropriate  - Keep care items and personal belongings within reach  - Initiate and maintain comfort rounds  - Make Fall Risk Sign visible to staff  - Offer Toileting every 2 Hours, in advance of need  - Initiate/Maintain bed alarm  - Obtain necessary fall risk management equipment: alarms  - Apply yellow socks and bracelet for high fall risk patients  - Consider moving patient to room near nurses station  Outcome: Progressing     Problem: MOBILITY - ADULT  Goal: Maintain or return to baseline ADL function  Description: INTERVENTIONS:  -  Assess patient's ability to carry out ADLs; assess patient's baseline for ADL function and identify physical deficits which impact ability to perform ADLs (bathing, care of mouth/teeth, toileting, grooming, dressing, etc )  - Assess/evaluate cause of self-care deficits   - Assess range of motion  - Assess patient's mobility; develop plan if impaired  - Assess patient's need for assistive devices and provide as appropriate  - Encourage maximum independence but intervene and supervise when necessary  - Involve family in performance of ADLs  - Assess for home care needs following discharge   - Consider OT consult to assist with ADL evaluation and planning for discharge  - Provide patient education as appropriate  Outcome: Progressing  Goal: Maintains/Returns to pre admission functional level  Description: INTERVENTIONS:  - Perform BMAT or MOVE assessment daily    - Set and communicate daily mobility goal to care team and patient/family/caregiver     - Collaborate with rehabilitation services on mobility goals if consulted  - Reposition patient every 2 hours    -- Record patient progress and toleration of activity level   Outcome: Progressing     Problem: PAIN - ADULT  Goal: Verbalizes/displays adequate comfort level or baseline comfort level  Description: Interventions:  - Encourage patient to monitor pain and request assistance  - Assess pain using appropriate pain scale  - Administer analgesics based on type and severity of pain and evaluate response  - Implement non-pharmacological measures as appropriate and evaluate response  - Consider cultural and social influences on pain and pain management  - Notify physician/advanced practitioner if interventions unsuccessful or patient reports new pain  Outcome: Progressing     Problem: INFECTION - ADULT  Goal: Absence or prevention of progression during hospitalization  Description: INTERVENTIONS:  - Assess and monitor for signs and symptoms of infection  - Monitor lab/diagnostic results  - Monitor all insertion sites, i e  indwelling lines, tubes, and drains  - [place] appropriate cooling/warming therapies per order  - Administer medications as ordered  - Instruct and encourage patient and family to use good hand hygiene technique  - Identify and instruct in appropriate isolation precautions for identified infection/condition  Outcome: Progressing     Problem: SAFETY ADULT  Goal: Patient will remain free of falls  Description: INTERVENTIONS:  - Educate patient/family on patient safety including physical limitations  - Instruct patient to call for assistance with activity   - Consult OT/PT to assist with strengthening/mobility   - Keep Call bell within reach  - Keep bed low and locked with side rails adjusted as appropriate  - Keep care items and personal belongings within reach  - Initiate and maintain comfort rounds  - Make Fall Risk Sign visible to staff  - Offer Toileting every 2 Hours, in advance of need  - Initiate/Maintain bed alarm  - Obtain necessary fall risk management equipment: alarms  - Apply yellow socks and bracelet for high fall risk patients  - Consider moving patient to room near nurses station  Outcome: Progressing  Goal: Maintain or return to baseline ADL function  Description: INTERVENTIONS:  -  Assess patient's ability to carry out ADLs; assess patient's baseline for ADL function and identify physical deficits which impact ability to perform ADLs (bathing, care of mouth/teeth, toileting, grooming, dressing, etc )  - Assess/evaluate cause of self-care deficits   - Assess range of motion  - Assess patient's mobility; develop plan if impaired  - Assess patient's need for assistive devices and provide as appropriate  - Encourage maximum independence but intervene and supervise when necessary  - Involve family in performance of ADLs  - Assess for home care needs following discharge   - Consider OT consult to assist with ADL evaluation and planning for discharge  - Provide patient education as appropriate  Outcome: Progressing  Goal: Maintains/Returns to pre admission functional level  Description: INTERVENTIONS:  - Perform BMAT or MOVE assessment daily    - Set and communicate daily mobility goal to care team and patient/family/caregiver  - Collaborate with rehabilitation services on mobility goals if consulted  - Reposition patient every 2 hours    -- Record patient progress and toleration of activity level   Outcome: Progressing     Problem: DISCHARGE PLANNING  Goal: Discharge to home or other facility with appropriate resources  Description: INTERVENTIONS:  - Identify barriers to discharge w/patient and caregiver  - Arrange for needed discharge resources and transportation as appropriate  - Identify discharge learning needs (meds, wound care, etc )  - Refer to Case Management Department for coordinating discharge planning if the patient needs post-hospital services based on physician/advanced practitioner order or complex needs related to functional status, cognitive ability, or social support system  Outcome: Progressing     Problem: Knowledge Deficit  Goal: Patient/family/caregiver demonstrates understanding of disease process, treatment plan, medications, and discharge instructions  Description: Complete learning assessment and assess knowledge base    Interventions:  - Provide teaching at level of understanding  - Provide teaching via preferred learning methods  Outcome: Progressing     Problem: Prexisting or High Potential for Compromised Skin Integrity  Goal: Skin integrity is maintained or improved  Description: INTERVENTIONS:  - Identify patients at risk for skin breakdown  - Assess and monitor skin integrity  - Assess and monitor nutrition and hydration status  - Monitor labs   - Assess for incontinence   - Turn and reposition patient  - Assist with mobility/ambulation  - Relieve pressure over bony prominences  - Avoid friction and shearing  - Provide appropriate hygiene as needed including keeping skin clean and dry  - Evaluate need for skin moisturizer/barrier cream  - Collaborate with interdisciplinary team   - Patient/family teaching  - Consider wound care consult   Outcome: Progressing     Problem: METABOLIC, FLUID AND ELECTROLYTES - ADULT  Goal: Glucose maintained within target range  Description: INTERVENTIONS:  - Monitor Blood Glucose as ordered  - Assess for signs and symptoms of hyperglycemia and hypoglycemia  - Administer ordered medications to maintain glucose within target range  - Assess nutritional intake and initiate nutrition service referral as needed  Outcome: Progressing

## 2023-04-18 NOTE — NURSING NOTE
Patient discharged to home  IV removed  All belongings were taken  AVS reviewed with the patient  No further questions at this time

## 2023-04-18 NOTE — ASSESSMENT & PLAN NOTE
Lab Results   Component Value Date    HGBA1C 8 7 05/10/2021     Recent Labs     04/17/23  1536 04/17/23 2056 04/18/23  0718 04/18/23  1053   POCGLU 216* 155* 168* 182*   · Hold oral hypoglycemics and Trulicity  · Monitor Accu-Cheks, sliding scale for coverage

## 2023-04-18 NOTE — ASSESSMENT & PLAN NOTE
· Metabolic encephalopathy which was multifactorial and  secondary to UTI vs xanax vs dehydration vs hyponatremia    He has a chronic dela cruz which makes him prone to UTIs  Xanax stopped  Confusion is better    He was treated with levaquin for UTI due to drug allergies

## 2023-04-18 NOTE — DISCHARGE SUMMARY
[facility]  Discharge- Latrell Cast 1950, 67 y o  male MRN: [id number]  Unit/Bed#: E4 -01 Encounter: [id number]  Primary Care Provider: Raquel Carter DO   Date and time admitted to hospital: 4/10/2023  2:04 PM    * Acute metabolic encephalopathy  Assessment & Plan  · Metabolic encephalopathy which was multifactorial and  secondary to UTI vs xanax vs dehydration vs hyponatremia    He has a chronic dela cruz which makes him prone to UTIs  Xanax stopped  Confusion is better    He was treated with levaquin for UTI due to drug allergies  Diabetes mellitus [facility])  Assessment & Plan  Lab Results   Component Value Date    HGBA1C 8 7 05/10/2021     Recent Labs     04/17/23  1536 04/17/23  2056 04/18/23  0718 04/18/23  1053   POCGLU 216* 155* 168* 182*   · Hold oral hypoglycemics and Trulicity  · Monitor Accu-Cheks, sliding scale for coverage    Neurogenic bladder  Assessment & Plan  · Chronic suprapubic catheter in place    Multiple sclerosis ([facility] [address] )  Assessment & Plan  · Patient with history of longstanding multiple sclerosis  · Chronic lower extremity weakness, wheelchair-bound  · On gabapentin 300 mg twice daily and 600 mg at bedtime         Discharging Physician / Practitioner: Cleveland Ty DO  PCP: Raquel Carter DO  Admission Date:   Admission Orders (From admission, onward)     Ordered        04/10/23 1813  INPATIENT ADMISSION  Once                      Discharge Date: 04/18/23    Medical Problems     Resolved Problems  Date Reviewed: 4/11/2023   None           ·       Reason for Admission: confusion      Hospital Course:     Latrell Cast is a 67 y o  male patient who originally presented to the hospital on 4/10/2023 due to confusion  Thought to be secondary to UTI  He was treated with a course of levaquin due to his drug allergies  He has improved  We offerrred short term rehab but patient prefers to go home and says he has enough help there      Please "see above list of diagnoses and related plan for additional information  Condition at Discharge: stable       Discharge Day Visit / Exam:     Subjective:  Feels well  Wants to go home  No confusion      Vitals: Blood Pressure: 119/73 (04/18/23 1441)  Pulse: 73 (04/18/23 1441)  Temperature: (!) 96 9 °F (36 1 °C) (04/18/23 1441)  Temp Source: Temporal (04/18/23 1441)  Respirations: 17 (04/18/23 1441)  Height: 5' 4\" (162 6 cm) (04/14/23 1950)  Weight - Scale: 81 6 kg (179 lb 14 3 oz) (04/14/23 1950)  SpO2: 93 % (04/18/23 1441)    Exam:     Physical Exam  Vitals and nursing note reviewed  HENT:      Head: Normocephalic and atraumatic  Eyes:      Pupils: Pupils are equal, round, and reactive to light  Cardiovascular:      Rate and Rhythm: Normal rate and regular rhythm  Heart sounds: No murmur heard  No friction rub  No gallop  Pulmonary:      Effort: Pulmonary effort is normal       Breath sounds: Normal breath sounds  No wheezing or rales  Abdominal:      General: Bowel sounds are normal       Palpations: Abdomen is soft  Tenderness: There is no abdominal tenderness  Musculoskeletal:      Right lower leg: No edema  Left lower leg: No edema            Discharge instructions/Information to patient and family:   See after visit summary for information provided to patient and family  Provisions for Follow-Up Care:  See after visit summary for information related to follow-up care and any pertinent home health orders  Disposition:     Home       Discharge Statement:  I spent 38 minutes discharging the patient  This time was spent on the day of discharge  I had direct contact with the patient on the day of discharge  Greater than 50% of the total time was spent examining patient, answering all patient questions, arranging and discussing plan of care with patient as well as directly providing post-discharge instructions    Additional time then spent on discharge " activities  Discharge Medications:  See after visit summary for reconciled discharge medications provided to patient and family        ** Please Note: This note has been constructed using a voice recognition system **

## 2023-04-18 NOTE — PLAN OF CARE
Problem: Potential for Falls  Goal: Patient will remain free of falls  Description: INTERVENTIONS:  - Educate patient/family on patient safety including physical limitations  - Instruct patient to call for assistance with activity   - Consult OT/PT to assist with strengthening/mobility   - Keep Call bell within reach  - Keep bed low and locked with side rails adjusted as appropriate  - Keep care items and personal belongings within reach  - Initiate and maintain comfort rounds  - Make Fall Risk Sign visible to staff  - Offer Toileting every 2 Hours, in advance of need  - Initiate/Maintain bed alarm  - Obtain necessary fall risk management equipment: alarms  - Apply yellow socks and bracelet for high fall risk patients  - Consider moving patient to room near nurses station  Outcome: Progressing     Problem: MOBILITY - ADULT  Goal: Maintain or return to baseline ADL function  Description: INTERVENTIONS:  -  Assess patient's ability to carry out ADLs; assess patient's baseline for ADL function and identify physical deficits which impact ability to perform ADLs (bathing, care of mouth/teeth, toileting, grooming, dressing, etc )  - Assess/evaluate cause of self-care deficits   - Assess range of motion  - Assess patient's mobility; develop plan if impaired  - Assess patient's need for assistive devices and provide as appropriate  - Encourage maximum independence but intervene and supervise when necessary  - Involve family in performance of ADLs  - Assess for home care needs following discharge   - Consider OT consult to assist with ADL evaluation and planning for discharge  - Provide patient education as appropriate  Outcome: Progressing     Problem: MOBILITY - ADULT  Goal: Maintains/Returns to pre admission functional level  Description: INTERVENTIONS:  - Perform BMAT or MOVE assessment daily    - Set and communicate daily mobility goal to care team and patient/family/caregiver     - Collaborate with rehabilitation services on mobility goals if consulted  - Reposition patient every 2 hours  -- Record patient progress and toleration of activity level   Outcome: Progressing     Problem: DISCHARGE PLANNING  Goal: Discharge to home or other facility with appropriate resources  Description: INTERVENTIONS:  - Identify barriers to discharge w/patient and caregiver  - Arrange for needed discharge resources and transportation as appropriate  - Identify discharge learning needs (meds, wound care, etc )  - Refer to Case Management Department for coordinating discharge planning if the patient needs post-hospital services based on physician/advanced practitioner order or complex needs related to functional status, cognitive ability, or social support system  Outcome: Progressing     Problem: Knowledge Deficit  Goal: Patient/family/caregiver demonstrates understanding of disease process, treatment plan, medications, and discharge instructions  Description: Complete learning assessment and assess knowledge base    Interventions:  - Provide teaching at level of understanding  - Provide teaching via preferred learning methods  Outcome: Progressing     Problem: Prexisting or High Potential for Compromised Skin Integrity  Goal: Skin integrity is maintained or improved  Description: INTERVENTIONS:  - Identify patients at risk for skin breakdown  - Assess and monitor skin integrity  - Assess and monitor nutrition and hydration status  - Monitor labs   - Assess for incontinence   - Turn and reposition patient  - Assist with mobility/ambulation  - Relieve pressure over bony prominences  - Avoid friction and shearing  - Provide appropriate hygiene as needed including keeping skin clean and dry  - Evaluate need for skin moisturizer/barrier cream  - Collaborate with interdisciplinary team   - Patient/family teaching  - Consider wound care consult   Outcome: Progressing

## 2023-04-18 NOTE — PLAN OF CARE
Problem: Potential for Falls  Goal: Patient will remain free of falls  Description: INTERVENTIONS:  - Educate patient/family on patient safety including physical limitations  - Instruct patient to call for assistance with activity   - Consult OT/PT to assist with strengthening/mobility   - Keep Call bell within reach  - Keep bed low and locked with side rails adjusted as appropriate  - Keep care items and personal belongings within reach  - Initiate and maintain comfort rounds  - Make Fall Risk Sign visible to staff  - Offer Toileting every 2 Hours, in advance of need  - Initiate/Maintain bed alarm  - Obtain necessary fall risk management equipment: alarms  - Apply yellow socks and bracelet for high fall risk patients  - Consider moving patient to room near nurses station  4/18/2023 1845 by Sheryl Ramos RN  Outcome: Completed  4/18/2023 1402 by Sheryl Ramos RN  Outcome: Progressing     Problem: MOBILITY - ADULT  Goal: Maintain or return to baseline ADL function  Description: INTERVENTIONS:  -  Assess patient's ability to carry out ADLs; assess patient's baseline for ADL function and identify physical deficits which impact ability to perform ADLs (bathing, care of mouth/teeth, toileting, grooming, dressing, etc )  - Assess/evaluate cause of self-care deficits   - Assess range of motion  - Assess patient's mobility; develop plan if impaired  - Assess patient's need for assistive devices and provide as appropriate  - Encourage maximum independence but intervene and supervise when necessary  - Involve family in performance of ADLs  - Assess for home care needs following discharge   - Consider OT consult to assist with ADL evaluation and planning for discharge  - Provide patient education as appropriate  4/18/2023 1845 by Sheryl Ramos RN  Outcome: Completed  4/18/2023 1402 by Sheryl Ramos RN  Outcome: Progressing  Goal: Maintains/Returns to pre admission functional level  Description: INTERVENTIONS:  - Perform BMAT or MOVE assessment daily    - Set and communicate daily mobility goal to care team and patient/family/caregiver  - Collaborate with rehabilitation services on mobility goals if consulted  - Reposition patient every 2 hours    -- Record patient progress and toleration of activity level   4/18/2023 1845 by Reina Reyes RN  Outcome: Completed  4/18/2023 1402 by Reina Reyes RN  Outcome: Progressing     Problem: PAIN - ADULT  Goal: Verbalizes/displays adequate comfort level or baseline comfort level  Description: Interventions:  - Encourage patient to monitor pain and request assistance  - Assess pain using appropriate pain scale  - Administer analgesics based on type and severity of pain and evaluate response  - Implement non-pharmacological measures as appropriate and evaluate response  - Consider cultural and social influences on pain and pain management  - Notify physician/advanced practitioner if interventions unsuccessful or patient reports new pain  4/18/2023 1845 by Reina Reyes RN  Outcome: Completed  4/18/2023 1402 by Reina Reyes RN  Outcome: Progressing     Problem: INFECTION - ADULT  Goal: Absence or prevention of progression during hospitalization  Description: INTERVENTIONS:  - Assess and monitor for signs and symptoms of infection  - Monitor lab/diagnostic results  - Monitor all insertion sites, i e  indwelling lines, tubes, and drains  - [place] appropriate cooling/warming therapies per order  - Administer medications as ordered  - Instruct and encourage patient and family to use good hand hygiene technique  - Identify and instruct in appropriate isolation precautions for identified infection/condition  4/18/2023 1845 by Reina Reyes RN  Outcome: Completed  4/18/2023 1402 by Reina Reyes RN  Outcome: Progressing     Problem: SAFETY ADULT  Goal: Patient will remain free of falls  Description: INTERVENTIONS:  - Educate patient/family on patient safety including physical limitations  - Instruct patient to call for assistance with activity   - Consult OT/PT to assist with strengthening/mobility   - Keep Call bell within reach  - Keep bed low and locked with side rails adjusted as appropriate  - Keep care items and personal belongings within reach  - Initiate and maintain comfort rounds  - Make Fall Risk Sign visible to staff  - Offer Toileting every 2 Hours, in advance of need  - Initiate/Maintain bed alarm  - Obtain necessary fall risk management equipment: alarms  - Apply yellow socks and bracelet for high fall risk patients  - Consider moving patient to room near nurses station  4/18/2023 1845 by Luis Larson RN  Outcome: Completed  4/18/2023 1402 by Luis Larson RN  Outcome: Progressing  Goal: Maintain or return to baseline ADL function  Description: INTERVENTIONS:  -  Assess patient's ability to carry out ADLs; assess patient's baseline for ADL function and identify physical deficits which impact ability to perform ADLs (bathing, care of mouth/teeth, toileting, grooming, dressing, etc )  - Assess/evaluate cause of self-care deficits   - Assess range of motion  - Assess patient's mobility; develop plan if impaired  - Assess patient's need for assistive devices and provide as appropriate  - Encourage maximum independence but intervene and supervise when necessary  - Involve family in performance of ADLs  - Assess for home care needs following discharge   - Consider OT consult to assist with ADL evaluation and planning for discharge  - Provide patient education as appropriate  4/18/2023 1845 by Luis Larson RN  Outcome: Completed  4/18/2023 1402 by Luis Larson RN  Outcome: Progressing  Goal: Maintains/Returns to pre admission functional level  Description: INTERVENTIONS:  - Perform BMAT or MOVE assessment daily    - Set and communicate daily mobility goal to care team and patient/family/caregiver     - Collaborate with rehabilitation services on mobility goals if consulted  - Reposition patient every 2 hours  -- Record patient progress and toleration of activity level   4/18/2023 1845 by Sheila Simmonds, RN  Outcome: Completed  4/18/2023 1402 by Sheila Simmonds, RN  Outcome: Progressing     Problem: DISCHARGE PLANNING  Goal: Discharge to home or other facility with appropriate resources  Description: INTERVENTIONS:  - Identify barriers to discharge w/patient and caregiver  - Arrange for needed discharge resources and transportation as appropriate  - Identify discharge learning needs (meds, wound care, etc )  - Refer to Case Management Department for coordinating discharge planning if the patient needs post-hospital services based on physician/advanced practitioner order or complex needs related to functional status, cognitive ability, or social support system  4/18/2023 1845 by Sheila Simmonds, RN  Outcome: Completed  4/18/2023 1402 by Sheila Simmonds, RN  Outcome: Progressing     Problem: Knowledge Deficit  Goal: Patient/family/caregiver demonstrates understanding of disease process, treatment plan, medications, and discharge instructions  Description: Complete learning assessment and assess knowledge base    Interventions:  - Provide teaching at level of understanding  - Provide teaching via preferred learning methods  4/18/2023 1845 by Sheila Simmonds, RN  Outcome: Completed  4/18/2023 1402 by Sheila Simmonds, RN  Outcome: Progressing     Problem: Prexisting or High Potential for Compromised Skin Integrity  Goal: Skin integrity is maintained or improved  Description: INTERVENTIONS:  - Identify patients at risk for skin breakdown  - Assess and monitor skin integrity  - Assess and monitor nutrition and hydration status  - Monitor labs   - Assess for incontinence   - Turn and reposition patient  - Assist with mobility/ambulation  - Relieve pressure over bony prominences  - Avoid friction and shearing  - Provide appropriate hygiene as needed including keeping skin clean and dry  - Evaluate need for skin moisturizer/barrier cream  - Collaborate with interdisciplinary team   - Patient/family teaching  - Consider wound care consult   4/18/2023 1845 by Jung Aguila RN  Outcome: Completed  4/18/2023 1402 by Jung Aguila RN  Outcome: Progressing     Problem: METABOLIC, FLUID AND ELECTROLYTES - ADULT  Goal: Glucose maintained within target range  Description: INTERVENTIONS:  - Monitor Blood Glucose as ordered  - Assess for signs and symptoms of hyperglycemia and hypoglycemia  - Administer ordered medications to maintain glucose within target range  - Assess nutritional intake and initiate nutrition service referral as needed  4/18/2023 1845 by Jung Aguila RN  Outcome: Completed  4/18/2023 1402 by Jung Aguila RN  Outcome: Progressing

## 2023-04-19 NOTE — UTILIZATION REVIEW
NOTIFICATION OF ADMISSION DISCHARGE   This is a Notification of Discharge from [facility]  Please be advised that this patient has been discharge from our facility  Below you will find the admission and discharge date and time including the patient’s disposition  UTILIZATION REVIEW CONTACT:  Stan Edward  Utilization   Network Utilization Review Department  Phone: [phone] x carefully listen to the prompts  All voicemails are confidential   Email: [email or web address]     ADMISSION INFORMATION  PRESENTATION DATE: 4/10/2023  2:04 PM  OBERVATION ADMISSION DATE:   INPATIENT ADMISSION DATE: 4/10/23  6:13 PM   DISCHARGE DATE: 4/18/2023  6:46 PM   DISPOSITION:Home/Self Care    IMPORTANT INFORMATION:  Send all requests for admission clinical reviews, approved or denied determinations and any other requests to dedicated fax number below belonging to the campus where the patient is receiving treatment   List of dedicated fax numbers:  [address] DENIALS (Administrative/Medical Necessity) [phone]   [address] (Maternity/NICU/Pediatrics) [phone]   [facility] [phone]   [facility] 87 [phone]   [address] [phone]   220 [facility] [phone] 90 [facility] [phone]   [address] [place] [address] 119 [phone]   [facility]  [phone] 4058 [facility] [phone]   412 [facility] 850 [facility] [phone]

## 2023-05-01 NOTE — PROGRESS NOTES
Clarification:      UTI was likely secondary to chronic suprapubic catheter  UTI was present on admission

## 2023-06-03 ENCOUNTER — APPOINTMENT (EMERGENCY)
Dept: CT IMAGING | Facility: HOSPITAL | Age: 73
DRG: 871 | End: 2023-06-03
Payer: MEDICARE

## 2023-06-03 ENCOUNTER — APPOINTMENT (EMERGENCY)
Dept: RADIOLOGY | Facility: HOSPITAL | Age: 73
DRG: 871 | End: 2023-06-03
Payer: MEDICARE

## 2023-06-03 ENCOUNTER — HOSPITAL ENCOUNTER (INPATIENT)
Facility: HOSPITAL | Age: 73
LOS: 3 days | Discharge: HOME HEALTH | DRG: 871 | End: 2023-06-06
Attending: EMERGENCY MEDICINE | Admitting: INTERNAL MEDICINE
Payer: MEDICARE

## 2023-06-03 DIAGNOSIS — N31.9 NEUROGENIC BLADDER: ICD-10-CM

## 2023-06-03 DIAGNOSIS — A41.9 SEPSIS (HCC): Primary | ICD-10-CM

## 2023-06-03 DIAGNOSIS — N12 PYELONEPHRITIS: ICD-10-CM

## 2023-06-03 DIAGNOSIS — Z93.59 CHRONIC SUPRAPUBIC CATHETER (HCC): ICD-10-CM

## 2023-06-03 DIAGNOSIS — N39.0 UTI (URINARY TRACT INFECTION): ICD-10-CM

## 2023-06-03 PROBLEM — K59.00 CONSTIPATION: Status: ACTIVE | Noted: 2023-06-03

## 2023-06-03 PROBLEM — N28.1 CYST OF LEFT KIDNEY: Status: ACTIVE | Noted: 2023-06-03

## 2023-06-03 LAB
2HR DELTA HS TROPONIN: 0 NG/L
ALBUMIN SERPL BCP-MCNC: 3.6 G/DL (ref 3.5–5)
ALP SERPL-CCNC: 93 U/L (ref 34–104)
ALT SERPL W P-5'-P-CCNC: 19 U/L (ref 7–52)
ANION GAP SERPL CALCULATED.3IONS-SCNC: 10 MMOL/L (ref 4–13)
APTT PPP: 25 SECONDS (ref 23–37)
AST SERPL W P-5'-P-CCNC: 13 U/L (ref 13–39)
ATRIAL RATE: 106 BPM
ATRIAL RATE: 106 BPM
BACTERIA UR QL AUTO: ABNORMAL /HPF
BASOPHILS # BLD AUTO: 0.14 THOUSANDS/ÂΜL (ref 0–0.1)
BASOPHILS NFR BLD AUTO: 1 % (ref 0–1)
BILIRUB SERPL-MCNC: 0.46 MG/DL (ref 0.2–1)
BILIRUB UR QL STRIP: NEGATIVE
BUDDING YEAST: PRESENT
BUN SERPL-MCNC: 10 MG/DL (ref 5–25)
CALCIUM SERPL-MCNC: 9.1 MG/DL (ref 8.4–10.2)
CARDIAC TROPONIN I PNL SERPL HS: 6 NG/L
CARDIAC TROPONIN I PNL SERPL HS: 6 NG/L
CHLORIDE SERPL-SCNC: 96 MMOL/L (ref 96–108)
CLARITY UR: ABNORMAL
CO2 SERPL-SCNC: 23 MMOL/L (ref 21–32)
COLOR UR: COLORLESS
CREAT SERPL-MCNC: 0.76 MG/DL (ref 0.6–1.3)
D DIMER PPP FEU-MCNC: 0.46 UG/ML FEU
EOSINOPHIL # BLD AUTO: 0.59 THOUSAND/ÂΜL (ref 0–0.61)
EOSINOPHIL NFR BLD AUTO: 4 % (ref 0–6)
ERYTHROCYTE [DISTWIDTH] IN BLOOD BY AUTOMATED COUNT: 13.4 % (ref 11.6–15.1)
EST. AVERAGE GLUCOSE BLD GHB EST-MCNC: 217 MG/DL
GFR SERPL CREATININE-BSD FRML MDRD: 90 ML/MIN/1.73SQ M
GLUCOSE SERPL-MCNC: 167 MG/DL (ref 65–140)
GLUCOSE SERPL-MCNC: 179 MG/DL (ref 65–140)
GLUCOSE SERPL-MCNC: 218 MG/DL (ref 65–140)
GLUCOSE SERPL-MCNC: 280 MG/DL (ref 65–140)
GLUCOSE SERPL-MCNC: 291 MG/DL (ref 65–140)
GLUCOSE UR STRIP-MCNC: ABNORMAL MG/DL
HBA1C MFR BLD: 9.2 %
HCT VFR BLD AUTO: 41.6 % (ref 36.5–49.3)
HGB BLD-MCNC: 13.8 G/DL (ref 12–17)
HGB UR QL STRIP.AUTO: NEGATIVE
IMM GRANULOCYTES # BLD AUTO: 0.08 THOUSAND/UL (ref 0–0.2)
IMM GRANULOCYTES NFR BLD AUTO: 1 % (ref 0–2)
INR PPP: 0.89 (ref 0.84–1.19)
KETONES UR STRIP-MCNC: NEGATIVE MG/DL
LACTATE SERPL-SCNC: 1.6 MMOL/L (ref 0.5–2)
LACTATE SERPL-SCNC: 2.2 MMOL/L (ref 0.5–2)
LEUKOCYTE ESTERASE UR QL STRIP: ABNORMAL
LYMPHOCYTES # BLD AUTO: 2.71 THOUSANDS/ÂΜL (ref 0.6–4.47)
LYMPHOCYTES NFR BLD AUTO: 17 % (ref 14–44)
MCH RBC QN AUTO: 29 PG (ref 26.8–34.3)
MCHC RBC AUTO-ENTMCNC: 33.2 G/DL (ref 31.4–37.4)
MCV RBC AUTO: 87 FL (ref 82–98)
MONOCYTES # BLD AUTO: 1.16 THOUSAND/ÂΜL (ref 0.17–1.22)
MONOCYTES NFR BLD AUTO: 7 % (ref 4–12)
NEUTROPHILS # BLD AUTO: 10.93 THOUSANDS/ÂΜL (ref 1.85–7.62)
NEUTS SEG NFR BLD AUTO: 70 % (ref 43–75)
NITRITE UR QL STRIP: POSITIVE
NON-SQ EPI CELLS URNS QL MICRO: ABNORMAL /HPF
NRBC BLD AUTO-RTO: 0 /100 WBCS
P AXIS: 58 DEGREES
P AXIS: 61 DEGREES
PH UR STRIP.AUTO: 6 [PH]
PLATELET # BLD AUTO: 317 THOUSANDS/UL (ref 149–390)
PMV BLD AUTO: 8.3 FL (ref 8.9–12.7)
POTASSIUM SERPL-SCNC: 3.8 MMOL/L (ref 3.5–5.3)
PR INTERVAL: 156 MS
PR INTERVAL: 166 MS
PROCALCITONIN SERPL-MCNC: 0.09 NG/ML
PROT SERPL-MCNC: 7 G/DL (ref 6.4–8.4)
PROT UR STRIP-MCNC: NEGATIVE MG/DL
PROTHROMBIN TIME: 12 SECONDS (ref 11.6–14.5)
QRS AXIS: 65 DEGREES
QRS AXIS: 70 DEGREES
QRSD INTERVAL: 82 MS
QRSD INTERVAL: 86 MS
QT INTERVAL: 324 MS
QT INTERVAL: 354 MS
QTC INTERVAL: 430 MS
QTC INTERVAL: 470 MS
RBC # BLD AUTO: 4.76 MILLION/UL (ref 3.88–5.62)
RBC #/AREA URNS AUTO: ABNORMAL /HPF
SODIUM SERPL-SCNC: 129 MMOL/L (ref 135–147)
SP GR UR STRIP.AUTO: 1.02 (ref 1–1.03)
T WAVE AXIS: 51 DEGREES
T WAVE AXIS: 58 DEGREES
UROBILINOGEN UR STRIP-ACNC: <2 MG/DL
VENTRICULAR RATE: 106 BPM
VENTRICULAR RATE: 106 BPM
WBC # BLD AUTO: 15.61 THOUSAND/UL (ref 4.31–10.16)
WBC #/AREA URNS AUTO: ABNORMAL /HPF

## 2023-06-03 PROCEDURE — 93010 ELECTROCARDIOGRAM REPORT: CPT | Performed by: INTERNAL MEDICINE

## 2023-06-03 PROCEDURE — 87040 BLOOD CULTURE FOR BACTERIA: CPT

## 2023-06-03 PROCEDURE — 82948 REAGENT STRIP/BLOOD GLUCOSE: CPT

## 2023-06-03 PROCEDURE — 87186 SC STD MICRODIL/AGAR DIL: CPT

## 2023-06-03 PROCEDURE — 85730 THROMBOPLASTIN TIME PARTIAL: CPT

## 2023-06-03 PROCEDURE — 87077 CULTURE AEROBIC IDENTIFY: CPT

## 2023-06-03 PROCEDURE — 80053 COMPREHEN METABOLIC PANEL: CPT

## 2023-06-03 PROCEDURE — 83605 ASSAY OF LACTIC ACID: CPT

## 2023-06-03 PROCEDURE — 81001 URINALYSIS AUTO W/SCOPE: CPT

## 2023-06-03 PROCEDURE — 96361 HYDRATE IV INFUSION ADD-ON: CPT

## 2023-06-03 PROCEDURE — 36415 COLL VENOUS BLD VENIPUNCTURE: CPT

## 2023-06-03 PROCEDURE — 71260 CT THORAX DX C+: CPT

## 2023-06-03 PROCEDURE — 74177 CT ABD & PELVIS W/CONTRAST: CPT

## 2023-06-03 PROCEDURE — 87086 URINE CULTURE/COLONY COUNT: CPT

## 2023-06-03 PROCEDURE — 83036 HEMOGLOBIN GLYCOSYLATED A1C: CPT | Performed by: PHYSICIAN ASSISTANT

## 2023-06-03 PROCEDURE — 85610 PROTHROMBIN TIME: CPT

## 2023-06-03 PROCEDURE — 71046 X-RAY EXAM CHEST 2 VIEWS: CPT

## 2023-06-03 PROCEDURE — 84145 PROCALCITONIN (PCT): CPT

## 2023-06-03 PROCEDURE — 99223 1ST HOSP IP/OBS HIGH 75: CPT | Performed by: HOSPITALIST

## 2023-06-03 PROCEDURE — 93005 ELECTROCARDIOGRAM TRACING: CPT

## 2023-06-03 PROCEDURE — 99285 EMERGENCY DEPT VISIT HI MDM: CPT

## 2023-06-03 PROCEDURE — 85379 FIBRIN DEGRADATION QUANT: CPT

## 2023-06-03 PROCEDURE — 85025 COMPLETE CBC W/AUTO DIFF WBC: CPT

## 2023-06-03 PROCEDURE — 96365 THER/PROPH/DIAG IV INF INIT: CPT

## 2023-06-03 PROCEDURE — G1004 CDSM NDSC: HCPCS

## 2023-06-03 PROCEDURE — 84484 ASSAY OF TROPONIN QUANT: CPT

## 2023-06-03 RX ORDER — INSULIN LISPRO 100 [IU]/ML
1-6 INJECTION, SOLUTION INTRAVENOUS; SUBCUTANEOUS
Status: DISCONTINUED | OUTPATIENT
Start: 2023-06-03 | End: 2023-06-06 | Stop reason: HOSPADM

## 2023-06-03 RX ORDER — ATORVASTATIN CALCIUM 80 MG/1
80 TABLET, FILM COATED ORAL
Status: DISCONTINUED | OUTPATIENT
Start: 2023-06-03 | End: 2023-06-06 | Stop reason: HOSPADM

## 2023-06-03 RX ORDER — GABAPENTIN 300 MG/1
300 CAPSULE ORAL 2 TIMES DAILY
Status: DISCONTINUED | OUTPATIENT
Start: 2023-06-03 | End: 2023-06-06 | Stop reason: HOSPADM

## 2023-06-03 RX ORDER — POLYETHYLENE GLYCOL 3350 17 G/17G
17 POWDER, FOR SOLUTION ORAL DAILY PRN
Status: DISCONTINUED | OUTPATIENT
Start: 2023-06-03 | End: 2023-06-06 | Stop reason: HOSPADM

## 2023-06-03 RX ORDER — ACETAMINOPHEN 325 MG/1
650 TABLET ORAL EVERY 6 HOURS PRN
Status: DISCONTINUED | OUTPATIENT
Start: 2023-06-03 | End: 2023-06-06 | Stop reason: HOSPADM

## 2023-06-03 RX ORDER — ALBUTEROL SULFATE 2.5 MG/3ML
2.5 SOLUTION RESPIRATORY (INHALATION) EVERY 6 HOURS PRN
Status: DISCONTINUED | OUTPATIENT
Start: 2023-06-03 | End: 2023-06-06 | Stop reason: HOSPADM

## 2023-06-03 RX ORDER — AMOXICILLIN 250 MG
2 CAPSULE ORAL
Status: DISCONTINUED | OUTPATIENT
Start: 2023-06-03 | End: 2023-06-06 | Stop reason: HOSPADM

## 2023-06-03 RX ORDER — LATANOPROST 50 UG/ML
1 SOLUTION/ DROPS OPHTHALMIC
Status: DISCONTINUED | OUTPATIENT
Start: 2023-06-03 | End: 2023-06-06 | Stop reason: HOSPADM

## 2023-06-03 RX ORDER — GABAPENTIN 300 MG/1
600 CAPSULE ORAL
Status: DISCONTINUED | OUTPATIENT
Start: 2023-06-03 | End: 2023-06-06 | Stop reason: HOSPADM

## 2023-06-03 RX ORDER — CLOPIDOGREL BISULFATE 75 MG/1
75 TABLET ORAL DAILY
Status: DISCONTINUED | OUTPATIENT
Start: 2023-06-03 | End: 2023-06-06 | Stop reason: HOSPADM

## 2023-06-03 RX ORDER — LANOLIN ALCOHOL/MO/W.PET/CERES
3 CREAM (GRAM) TOPICAL
Status: DISCONTINUED | OUTPATIENT
Start: 2023-06-03 | End: 2023-06-06 | Stop reason: HOSPADM

## 2023-06-03 RX ORDER — ENOXAPARIN SODIUM 100 MG/ML
40 INJECTION SUBCUTANEOUS DAILY
Status: DISCONTINUED | OUTPATIENT
Start: 2023-06-03 | End: 2023-06-06 | Stop reason: HOSPADM

## 2023-06-03 RX ORDER — PROPRANOLOL HCL 60 MG
60 CAPSULE, EXTENDED RELEASE 24HR ORAL DAILY
Status: DISCONTINUED | OUTPATIENT
Start: 2023-06-03 | End: 2023-06-06 | Stop reason: HOSPADM

## 2023-06-03 RX ORDER — SERTRALINE HYDROCHLORIDE 25 MG/1
25 TABLET, FILM COATED ORAL
Status: DISCONTINUED | OUTPATIENT
Start: 2023-06-03 | End: 2023-06-06 | Stop reason: HOSPADM

## 2023-06-03 RX ORDER — INSULIN GLARGINE 100 [IU]/ML
5 INJECTION, SOLUTION SUBCUTANEOUS EVERY MORNING
Status: DISCONTINUED | OUTPATIENT
Start: 2023-06-03 | End: 2023-06-04

## 2023-06-03 RX ORDER — ONDANSETRON 2 MG/ML
4 INJECTION INTRAMUSCULAR; INTRAVENOUS EVERY 6 HOURS PRN
Status: DISCONTINUED | OUTPATIENT
Start: 2023-06-03 | End: 2023-06-06 | Stop reason: HOSPADM

## 2023-06-03 RX ORDER — PANTOPRAZOLE SODIUM 40 MG/1
40 TABLET, DELAYED RELEASE ORAL
Status: DISCONTINUED | OUTPATIENT
Start: 2023-06-03 | End: 2023-06-06 | Stop reason: HOSPADM

## 2023-06-03 RX ORDER — AMLODIPINE BESYLATE 10 MG/1
10 TABLET ORAL DAILY
Status: DISCONTINUED | OUTPATIENT
Start: 2023-06-04 | End: 2023-06-06 | Stop reason: HOSPADM

## 2023-06-03 RX ORDER — CALCIUM CARBONATE 500 MG/1
1000 TABLET, CHEWABLE ORAL DAILY PRN
Status: DISCONTINUED | OUTPATIENT
Start: 2023-06-03 | End: 2023-06-06 | Stop reason: HOSPADM

## 2023-06-03 RX ORDER — ALPRAZOLAM 0.25 MG/1
0.25 TABLET ORAL 2 TIMES DAILY PRN
Status: DISCONTINUED | OUTPATIENT
Start: 2023-06-03 | End: 2023-06-06 | Stop reason: HOSPADM

## 2023-06-03 RX ORDER — SODIUM CHLORIDE 9 MG/ML
75 INJECTION, SOLUTION INTRAVENOUS CONTINUOUS
Status: DISCONTINUED | OUTPATIENT
Start: 2023-06-03 | End: 2023-06-04

## 2023-06-03 RX ADMIN — GABAPENTIN 300 MG: 300 CAPSULE ORAL at 17:03

## 2023-06-03 RX ADMIN — INSULIN LISPRO 1 UNITS: 100 INJECTION, SOLUTION INTRAVENOUS; SUBCUTANEOUS at 22:03

## 2023-06-03 RX ADMIN — INSULIN LISPRO 1 UNITS: 100 INJECTION, SOLUTION INTRAVENOUS; SUBCUTANEOUS at 17:03

## 2023-06-03 RX ADMIN — SENNOSIDES AND DOCUSATE SODIUM 2 TABLET: 8.6; 5 TABLET ORAL at 22:03

## 2023-06-03 RX ADMIN — LATANOPROST 1 DROP: 50 SOLUTION OPHTHALMIC at 22:02

## 2023-06-03 RX ADMIN — SERTRALINE HYDROCHLORIDE 25 MG: 25 TABLET ORAL at 22:03

## 2023-06-03 RX ADMIN — ACETAMINOPHEN 325MG 650 MG: 325 TABLET ORAL at 15:14

## 2023-06-03 RX ADMIN — GABAPENTIN 600 MG: 300 CAPSULE ORAL at 22:03

## 2023-06-03 RX ADMIN — SODIUM CHLORIDE 1000 ML: 0.9 INJECTION, SOLUTION INTRAVENOUS at 04:50

## 2023-06-03 RX ADMIN — GABAPENTIN 300 MG: 300 CAPSULE ORAL at 09:19

## 2023-06-03 RX ADMIN — CLOPIDOGREL BISULFATE 75 MG: 75 TABLET ORAL at 09:19

## 2023-06-03 RX ADMIN — ATORVASTATIN CALCIUM 80 MG: 80 TABLET, FILM COATED ORAL at 17:03

## 2023-06-03 RX ADMIN — PROPRANOLOL HYDROCHLORIDE 60 MG: 60 CAPSULE, EXTENDED RELEASE ORAL at 12:23

## 2023-06-03 RX ADMIN — SODIUM CHLORIDE 75 ML/HR: 0.9 INJECTION, SOLUTION INTRAVENOUS at 15:21

## 2023-06-03 RX ADMIN — PANTOPRAZOLE SODIUM 40 MG: 40 TABLET, DELAYED RELEASE ORAL at 09:19

## 2023-06-03 RX ADMIN — INSULIN LISPRO 2 UNITS: 100 INJECTION, SOLUTION INTRAVENOUS; SUBCUTANEOUS at 12:23

## 2023-06-03 RX ADMIN — ENOXAPARIN SODIUM 40 MG: 100 INJECTION SUBCUTANEOUS at 09:19

## 2023-06-03 RX ADMIN — INSULIN GLARGINE 5 UNITS: 100 INJECTION, SOLUTION SUBCUTANEOUS at 12:23

## 2023-06-03 RX ADMIN — IOHEXOL 100 ML: 350 INJECTION, SOLUTION INTRAVENOUS at 05:17

## 2023-06-03 RX ADMIN — CEFTRIAXONE 2000 MG: 10 INJECTION, POWDER, FOR SOLUTION INTRAVENOUS at 04:48

## 2023-06-03 NOTE — ED CARE HANDOFF
Emergency Department Sign Out Note        Sign out and transfer of care from [facility]  See Separate Emergency Department note  The patient, Eber Doe, was evaluated by the previous provider for  palpatations    Workup Completed:  Labs, urine - suspect urosepsis as source of his tachycardia - received IV fluids and abx    ED Course / Workup Pending (followup):  Pending results of CT  Discussed admission with pt  - pt agrees to stay  Discussed CT results - pt suprapubic cath - he caps - and it is not hooked up to a bag  ED Course as of 06/03/23 1516   Sat Jun 03, 2023   9095 Urosepsis - receiving iv abx, awaiting CT  Having confusion   0655 I also reviewed the CAT scan his bladder is mildly distended and the wall is thickened  I spoke with the patient and his suprapubic catheter is capped  And then he drains it when it needs to  He states its been like that for years  Will admit to [facility] for urosepsis and pyelonephritis     Procedures  Medical Decision Making  Signed out to myself awaiting CAT scan result  Treating for urosepsis evaluating for any other intra-abdominal pathology causing patient's symptoms  Discussed results with patient and evaluation  Pyelonephritis: acute illness or injury  Sepsis [facility]): acute illness or injury  UTI (urinary tract infection): acute illness or injury  Amount and/or Complexity of Data Reviewed  Labs: ordered  Radiology: ordered and independent interpretation performed  Details: Reviewed CAT scan visualized to the urine left in patient's bladder  Discussed this with patient and his suprapubic cath  Discussion of management or test interpretation with external provider(s): Discussed case with internal medicine    Risk  Prescription drug management  Decision regarding hospitalization                Disposition  Final diagnoses:   Sepsis ([address] )   UTI (urinary tract infection)   Pyelonephritis     Time reflects when diagnosis was documented in both MDM as applicable and the Disposition within this note     Time User Action Codes Description Comment    6/3/2023  6:56 AM Monica Barton Add [A41 9] Sepsis ([address] )     6/3/2023  6:56 AM Monica Barton Add [N39 0] UTI (urinary tract infection)     6/3/2023  6:56 AM Monica Barton Add [N12] Pyelonephritis     6/3/2023  9:18 AM Rebekah Dearth Add [N31 9] Neurogenic bladder     6/3/2023  9:18 AM Rebekah Dearth Add [Z93 59] Chronic suprapubic catheter [facility])       ED Disposition     ED Disposition   Admit    Condition   Stable    Date/Time   Sat Raffaele 3, 2023  6:56 AM    Comment   Case was discussed with Maria R Chandler and the patient's admission status was agreed to be Admission Status: inpatient status to the service of Dr Clem Polo             Follow-up Information    None       Current Discharge Medication List      CONTINUE these medications which have NOT CHANGED    Details   albuterol (PROVENTIL HFA,VENTOLIN HFA) 90 mcg/act inhaler Inhale 2 puffs every 6 (six) hours as needed for wheezing      ALPRAZolam (XANAX) 0 25 mg tablet Take 0 25 mg by mouth 2 (two) times a day as needed for anxiety or sleep       amLODIPine-atorvastatin (CADUET) 10-80 MG per tablet Take 1 tablet by mouth daily      clopidogrel (PLAVIX) 75 mg tablet Take 1 tablet (75 mg total) by mouth daily  Refills: 0    Associated Diagnoses: Chronic suprapubic catheter ([address] ); Neurogenic bladder; Cellulitis      Dulaglutide (Trulicity) 0 44 XO/6 3YW SOPN Inject 0 75 mg under the skin once a week      Ergocalciferol (VITAMIN D2 PO) Take 50,000 Units by mouth once a week      furosemide (LASIX) 40 mg tablet Take 40 mg by mouth daily      !! gabapentin (NEURONTIN) 300 mg capsule Take 1 capsule (300 mg total) by mouth 2 (two) times a day  Qty: 60 capsule, Refills: 0    Associated Diagnoses: Multiple sclerosis ([address] )      ! ! gabapentin (NEURONTIN) 300 mg capsule Take 2 capsules (600 mg total) by mouth daily at bedtime  Qty: 30 capsule, Refills: 0    Associated Diagnoses: Multiple sclerosis (HCC)      latanoprost (XALATAN) 0 005 % ophthalmic solution Administer 1 drop to both eyes daily at bedtime      melatonin 3 mg Take 3 mg by mouth daily at bedtime as needed      meloxicam (Mobic) 15 mg tablet Take 1 tablet (15 mg total) by mouth daily Prn pain  Qty: 20 tablet, Refills: 0    Associated Diagnoses: Calcific tendonitis of left shoulder      pantoprazole (PROTONIX) 40 mg tablet TAKE 1 TABLET TWICE DAILY 30 MINUTES BEFORE BREAKFAST AND DINNER  Qty: 180 tablet, Refills: 1    Associated Diagnoses: Gastroesophageal reflux disease without esophagitis      polyethylene glycol (MIRALAX) 17 g packet Take 17 g by mouth daily as needed      potassium chloride (Klor-Con) 10 mEq tablet Take 10 mEq by mouth 2 (two) times a day      propranolol (INDERAL LA) 60 mg 24 hr capsule Take 60 mg by mouth daily      senna-docusate sodium (SENOKOT S) 8 6-50 mg per tablet Take 2 tablets by mouth daily at bedtime      sertraline (ZOLOFT) 25 mg tablet Take 25 mg by mouth daily at bedtime      albuterol (2 5 mg/3 mL) 0 083 % nebulizer solution Take 2 5 mg by nebulization every 6 (six) hours as needed for wheezing or shortness of breath       !! - Potential duplicate medications found  Please discuss with provider  No discharge procedures on file         ED Provider  Electronically Signed by     Sarah Das PA-C  06/03/23 1856

## 2023-06-03 NOTE — ASSESSMENT & PLAN NOTE
Wt Readings from Last 3 Encounters:   06/03/23 82 2 kg (181 lb 3 5 oz)   04/14/23 81 6 kg (179 lb 14 3 oz)   02/27/23 81 6 kg (180 lb)       · Last echo reviewed from 2018: EF 09%, grade 1 diastolic dysfunction  · Maintained on Lasix 40 mg daily, will hold for now in the setting of sepsis  · Monitor volume status, intake/output

## 2023-06-03 NOTE — SEPSIS NOTE
Sepsis Note   Abel January 67 y o  male MRN: [id number]  Unit/Bed#: ED-24 Encounter: [id number]       Initial Sepsis Screening     Row Name 06/03/23 0225                Is the patient's history suggestive of a new or worsening infection? Yes (Proceed)  -JA        Suspected source of infection suspect infection, source unknown  -JA        Indicate SIRS criteria Tachycardia > 90 bpm;Leukocytosis (WBC > 70220 IJL) OR Leukopenia (WBC <4000 IJL) OR Bandemia (WBC >10% bands)  -JA        Are two or more of the above signs & symptoms of infection both present and new to the patient? Yes (Proceed)  -JA        Assess for evidence of organ dysfunction: Are any of the below criteria present within 6 hours of suspected infection and SIRS criteria that are NOT considered to be chronic conditions? --              User Key  (r) = Recorded By, (t) = Taken By, (c) = Cosigned By    Initials Name Provider Type    Doron Harding PA-C Physician Assistant                    Body mass index is 31 11 kg/m²    Wt Readings from Last 1 Encounters:   06/03/23 82 2 kg (181 lb 3 5 oz)        Ideal body weight: 59 2 kg (130 lb 8 2 oz)  Adjusted ideal body weight: 68 4 kg (150 lb 12 7 oz)

## 2023-06-03 NOTE — ASSESSMENT & PLAN NOTE
Lab Results   Component Value Date    HGBA1C 8 7 05/10/2021       Recent Labs     06/03/23  0216   POCGLU 280*       Blood Sugar Average: Last 72 hrs:  (P) 280   · We will update A1c  · Maintained on Trulicity  · Placed on sign scale insulin, Accu-Cheks while hospitalized

## 2023-06-03 NOTE — ASSESSMENT & PLAN NOTE
"· Noted on CT \"Cortical cyst at the lower pole of the left kidney, 19 mm  No urinary tract calculi  No hydronephrosis  No suspicious solid renal mass  \"   "

## 2023-06-03 NOTE — ASSESSMENT & PLAN NOTE
· Neurogenic bladder with chronic suprapubic catheter  · Urology consulted for exchange consideration   · Monitor I's/O

## 2023-06-03 NOTE — ASSESSMENT & PLAN NOTE
· Patient presented meeting sepsis criteria in the setting of left sided pyelonephritis with chronic suprapubic dela cruz catheter from neurogenic bladder   · Urine and blood cultures obtained in the emergency department  · Started on IV ceftriaxone we will continue for now and tailor antibiotics based on cultures  · Monitor fever curve and hemodynamics  · Lactic acidosis already resolved with 1L fluids in the emergency department

## 2023-06-03 NOTE — ASSESSMENT & PLAN NOTE
· History of longstanding multiple sclerosis, wheelchair-bound with neurogenic bladder and chronic suprapubic C[place] catheter  · Not on a disease modifying agent   · Continue gabapentin 300 mg bid and 600 mg at bedtime  · Follows with Neurology outpatient

## 2023-06-03 NOTE — ED PROVIDER NOTES
History  Chief Complaint   Patient presents with   • Palpitations     Pt reports palpitations and sob beginning 2 hours ago  Pt states it's difficult to catch his breath and feels pounding/pressure in his chest  Pt also c/o headache and numbness to face  Pt is a poor historian  The patient is a 58-year-old male with history of MS, diabetes mellitus, CVA, hypertension who presents to the ED for evaluation of palpitations  He reports that these began 2 hours ago, and describes it as feeling as though his heart is racing, and describes a pounding sensation in his chest   He reports associated shortness of breath  It has improved somewhat since starting  He also notes facial numbness, and possible numbness to his right arm, but he believes  that he has had this since his previous CVA  He is otherwise unsure when this would have started  He also notes having a cough and headache today  He otherwise denies chest pain, fever, chills, hematuria, vomiting, diarrhea, leg swelling  The patient is alert and oriented x3, but must be redirected during HPI several times  Family reports history of confusion at baseline  Prior to Admission Medications   Prescriptions Last Dose Informant Patient Reported? Taking?    ALPRAZolam (XANAX) 0 25 mg tablet  Self Yes Yes   Sig: Take 0 25 mg by mouth 2 (two) times a day as needed for anxiety or sleep    Dulaglutide (Trulicity) 2 55 EE/0 4MU SOPN  Self Yes Yes   Sig: Inject 0 75 mg under the skin once a week   Ergocalciferol (VITAMIN D2 PO)  Self Yes Yes   Sig: Take 50,000 Units by mouth once a week   albuterol (2 5 mg/3 mL) 0 083 % nebulizer solution  Self Yes No   Sig: Take 2 5 mg by nebulization every 6 (six) hours as needed for wheezing or shortness of breath   albuterol (PROVENTIL HFA,VENTOLIN HFA) 90 mcg/act inhaler  Self Yes Yes   Sig: Inhale 2 puffs every 6 (six) hours as needed for wheezing   amLODIPine-atorvastatin (CADUET) 10-80 MG per tablet  Self Yes Yes   Sig: Take 1 tablet by mouth daily   clopidogrel (PLAVIX) 75 mg tablet  Self No Yes   Sig: Take 1 tablet (75 mg total) by mouth daily   furosemide (LASIX) 40 mg tablet  Self Yes Yes   Sig: Take 40 mg by mouth daily   gabapentin (NEURONTIN) 300 mg capsule  Self No Yes   Sig: Take 1 capsule (300 mg total) by mouth 2 (two) times a day   gabapentin (NEURONTIN) 300 mg capsule   No Yes   Sig: Take 2 capsules (600 mg total) by mouth daily at bedtime   latanoprost (XALATAN) 0 005 % ophthalmic solution  Self Yes Yes   Sig: Administer 1 drop to both eyes daily at bedtime   melatonin 3 mg  Self Yes Yes   Sig: Take 3 mg by mouth daily at bedtime as needed   meloxicam (Mobic) 15 mg tablet   No Yes   Sig: Take 1 tablet (15 mg total) by mouth daily Prn pain   pantoprazole (PROTONIX) 40 mg tablet   No Yes   Sig: TAKE 1 TABLET TWICE DAILY 30 MINUTES BEFORE BREAKFAST AND DINNER    polyethylene glycol (MIRALAX) 17 g packet   Yes Yes   Sig: Take 17 g by mouth daily as needed   potassium chloride (Klor-Con) 10 mEq tablet   Yes Yes   Sig: Take 10 mEq by mouth 2 (two) times a day   propranolol (INDERAL LA) 60 mg 24 hr capsule   Yes Yes   Sig: Take 60 mg by mouth daily   senna-docusate sodium (SENOKOT S) 8 6-50 mg per tablet   Yes Yes   Sig: Take 2 tablets by mouth daily at bedtime   sertraline (ZOLOFT) 25 mg tablet   Yes Yes   Sig: Take 25 mg by mouth daily at bedtime      Facility-Administered Medications: None       Past Medical History:   Diagnosis Date   • Acute laryngitis    • Acute nonsuppurative otitis media, unspecified laterality    • Arm weakness    • Arthritis    • 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HCC)    • Bladder infection    • Bronchitis    • Constipation    • Cough    • Diabetes mellitus ([facility])    • Dizziness    • Dysfunction of eustachian tube    • Erectile dysfunction of non-organic origin    • Fatigue    • Glaucoma    • Hiatal hernia    • Hypertension    • Imbalance    • Leg muscle spasm    • MS (multiple sclerosis) ([facility])    • Nephrolithiasis    • Neurogenic bladder    • No natural teeth    • Sinus pain    • Spinal stenosis    • Strain of thoracic region    • Stroke [facility])    • Suprapubic catheter [facility])        Past Surgical History:   Procedure Laterality Date   • APPENDECTOMY     • BRAIN SURGERY      Coil placed in aneurysm   • CYSTOSCOPY     • CYSTOSCOPY     • CYSTOSCOPY  2018   • CYSTOSCOPY  01/15/2021   • EYE SURGERY      transscleral cyclophotocoagulation noncontact YAG laser   • MYRINGOTOMY      with ventilation tube insertion   • OK LITHOLAPAXY SMPL/SM <2 5 CM N/A 2019    Procedure: CYSTOSCOPY, holmium laser litholapaxy of bladder stones, EXCHANGE OF SP TUBE;  Surgeon: Sky Centeno MD;  Location: [facility];  Service: Urology   • SUPRAPUBIC CATHETER INSERTION         Family History   Problem Relation Age of Onset   • Heart attack Mother    • Stroke Mother    • Heart attack Father    • Anuerysm Father         In Stomach      I have reviewed and agree with the history as documented  E-Cigarette/Vaping   • E-Cigarette Use Never User      E-Cigarette/Vaping Substances   • Nicotine No    • THC No    • CBD No    • Flavoring No    • Other No    • Unknown No      Social History     Tobacco Use   • Smoking status: Former     Packs/day: 0 50     Years: 31 00     Total pack years: 15 50     Types: Cigarettes     Start date:      Quit date:      Years since quittin 4   • Smokeless tobacco: Never   Vaping Use   • Vaping Use: Never used   Substance Use Topics   • Alcohol use: Not Currently   • Drug use: No       Review of Systems   Constitutional: Positive for fatigue  Negative for chills and fever  HENT: Negative for congestion and rhinorrhea  Respiratory: Positive for cough and shortness of breath  Cardiovascular: Positive for palpitations  Negative for chest pain and leg swelling  Gastrointestinal: Negative for abdominal pain, constipation, diarrhea, nausea and vomiting     Genitourinary: Negative for flank pain and hematuria  Musculoskeletal: Negative for arthralgias and myalgias  Skin: Negative for rash and wound  Neurological: Positive for numbness  Negative for dizziness, weakness and headaches  Psychiatric/Behavioral: Positive for confusion (At baseline per family)  Negative for behavioral problems  Physical Exam  Physical Exam  Vitals and nursing note reviewed  Constitutional:       General: He is not in acute distress  Appearance: He is well-developed  He is not toxic-appearing  HENT:      Head: Normocephalic and atraumatic  Mouth/Throat:      Mouth: Mucous membranes are moist    Eyes:      Extraocular Movements: Extraocular movements intact  Conjunctiva/sclera: Conjunctivae normal       Pupils: Pupils are equal, round, and reactive to light  Cardiovascular:      Rate and Rhythm: Regular rhythm  Tachycardia present  Heart sounds: No murmur heard  Pulmonary:      Effort: Pulmonary effort is normal  No respiratory distress  Breath sounds: Normal breath sounds  Abdominal:      Palpations: Abdomen is soft  Tenderness: There is no abdominal tenderness  There is no guarding or rebound  Comments: Supapubic catheter     Musculoskeletal:         General: No swelling  Cervical back: Neck supple  Right lower leg: No edema  Left lower leg: No edema  Skin:     General: Skin is warm and dry  Capillary Refill: Capillary refill takes less than 2 seconds  Neurological:      Mental Status: He is alert and oriented to person, place, and time  GCS: GCS eye subscore is 4  GCS verbal subscore is 5  GCS motor subscore is 6  Cranial Nerves: Cranial nerves 2-12 are intact  No facial asymmetry  Sensory: Sensory deficit present  Motor: Weakness present  No pronator drift  Comments: Reports decreased sensation to right side of face though extinction intact  Chronic bilateral lower extremity weakness   Strength 5/5 in upper extremities    Psychiatric:         Mood and Affect: Mood normal          Vital Signs  ED Triage Vitals   Temperature Pulse Respirations Blood Pressure SpO2   06/03/23 0140 06/03/23 0140 06/03/23 0140 06/03/23 0140 06/03/23 0140   97 7 °F (36 5 °C) (!) 106 16 130/63 94 %      Temp Source Heart Rate Source Patient Position - Orthostatic VS BP Location FiO2 (%)   06/03/23 0140 06/03/23 0140 06/03/23 0140 06/03/23 0140 --   Oral Monitor Sitting Right arm       Pain Score       06/03/23 0751       No Pain           Vitals:    06/03/23 1458 06/03/23 1508 06/03/23 1619 06/03/23 1622   BP: 106/59      Pulse: 92 87 90 88   Patient Position - Orthostatic VS: Lying            Visual Acuity      ED Medications  Medications   amLODIPine (NORVASC) tablet 10 mg (has no administration in time range)   atorvastatin (LIPITOR) tablet 80 mg (80 mg Oral Given 6/3/23 1703)   clopidogrel (PLAVIX) tablet 75 mg (75 mg Oral Given 6/3/23 0919)   gabapentin (NEURONTIN) capsule 300 mg (300 mg Oral Given 6/3/23 1703)   gabapentin (NEURONTIN) capsule 600 mg (has no administration in time range)   latanoprost (XALATAN) 0 005 % ophthalmic solution 1 drop (has no administration in time range)   propranolol (INDERAL LA) 24 hr capsule 60 mg (60 mg Oral Given 6/3/23 1223)   senna-docusate sodium (SENOKOT S) 8 6-50 mg per tablet 2 tablet (has no administration in time range)   sertraline (ZOLOFT) tablet 25 mg (has no administration in time range)   albuterol inhalation solution 2 5 mg (has no administration in time range)   ALPRAZolam (XANAX) tablet 0 25 mg (has no administration in time range)   melatonin tablet 3 mg (has no administration in time range)   polyethylene glycol (MIRALAX) packet 17 g (has no administration in time range)   pantoprazole (PROTONIX) EC tablet 40 mg (40 mg Oral Given 6/3/23 0919)   cefTRIAXone (ROCEPHIN) 1,000 mg in dextrose 5 % 50 mL IVPB (has no administration in time range)   acetaminophen (TYLENOL) tablet 650 mg (650 mg Oral Given 6/3/23 1514)   ondansetron (ZOFRAN) injection 4 mg (has no administration in time range)   calcium carbonate (TUMS) chewable tablet 1,000 mg (has no administration in time range)   enoxaparin (LOVENOX) subcutaneous injection 40 mg (40 mg Subcutaneous Given 6/3/23 0919)   insulin lispro (HumaLOG) 100 units/mL subcutaneous injection 1-6 Units (1 Units Subcutaneous Given 6/3/23 1703)   insulin lispro (HumaLOG) 100 units/mL subcutaneous injection 1-6 Units (has no administration in time range)   insulin glargine (LANTUS) subcutaneous injection 5 Units 0 05 mL (5 Units Subcutaneous Given 6/3/23 1223)   sodium chloride 0 9 % infusion (75 mL/hr Intravenous New Bag 6/3/23 1521)   ceftriaxone (ROCEPHIN) 2 g/50 mL in dextrose IVPB (0 mg Intravenous Stopped 6/3/23 0531)   sodium chloride 0 9 % bolus 1,000 mL (0 mL Intravenous Stopped 6/3/23 0627)   iohexol (OMNIPAQUE) 350 MG/ML injection (SINGLE-DOSE) 100 mL (100 mL Intravenous Given 6/3/23 0517)       Diagnostic Studies  Results Reviewed     Procedure Component Value Units Date/Time    Hemoglobin A1C w/ EAG Estimation [[id number]] Collected: 06/03/23 0214    Lab Status: In process Specimen: Blood Updated: 06/03/23 1217    Blood culture #1 [[id number]] Collected: 06/03/23 0251    Lab Status: Preliminary result Specimen: Blood from Arm, Right Updated: 06/03/23 0801     Blood Culture Received in Microbiology Lab  Culture in Progress  Blood culture #2 [[id number]] Collected: 06/03/23 0252    Lab Status: Preliminary result Specimen: Blood from Hand, Left Updated: 06/03/23 0801     Blood Culture Received in Microbiology Lab  Culture in Progress  Lactic acid 2 Hours [[id number]]  (Normal) Collected: 06/03/23 0536    Lab Status: Final result Specimen: Blood from Arm, Right Updated: 06/03/23 0558     LACTIC ACID 1 6 mmol/L     Narrative:      Result may be elevated if tourniquet was used during collection      HS Troponin I 2hr [[id number]]  (Normal) Collected: 06/03/23 8779    Lab Status: Final result Specimen: Blood from Arm, Right Updated: 06/03/23 0521     hs TnI 2hr 6 ng/L      Delta 2hr hsTnI 0 ng/L     Lactic acid, plasma (w/reflex if result > 2 0) [[id number]]  (Abnormal) Collected: 06/03/23 0251    Lab Status: Final result Specimen: Blood from Arm, Right Updated: 06/03/23 0325     LACTIC ACID 2 2 mmol/L     Narrative:      Result may be elevated if tourniquet was used during collection  Urine Microscopic [[id number]]  (Abnormal) Collected: 06/03/23 0255    Lab Status: Final result Specimen: Urine, Indwelling Cage Catheter Updated: 06/03/23 0325     RBC, UA 2-4 /hpf      WBC, UA Innumerable /hpf      Epithelial Cells None Seen /hpf      Bacteria, UA Moderate /hpf      Budding Yeast Present    Urine culture [[id number]] Collected: 06/03/23 0255    Lab Status:  In process Specimen: Urine, Indwelling Cage Catheter Updated: 06/03/23 0325    Comprehensive metabolic panel [[id number]]  (Abnormal) Collected: 06/03/23 0214    Lab Status: Final result Specimen: Blood from Arm, Left Updated: 06/03/23 0323     Sodium 129 mmol/L      Potassium 3 8 mmol/L      Chloride 96 mmol/L      CO2 23 mmol/L      ANION GAP 10 mmol/L      BUN 10 mg/dL      Creatinine 0 76 mg/dL      Glucose 291 mg/dL      Calcium 9 1 mg/dL      AST 13 U/L      ALT 19 U/L      Alkaline Phosphatase 93 U/L      Total Protein 7 0 g/dL      Albumin 3 6 g/dL      Total Bilirubin 0 46 mg/dL      eGFR 90 ml/min/1 73sq m     Narrative:      [place] guidelines for Chronic Kidney Disease (CKD):   •  Stage 1 with normal or high GFR (GFR > 90 mL/min/1 73 square meters)  •  Stage 2 Mild CKD (GFR = 60-89 mL/min/1 73 square meters)  •  Stage 3A Moderate CKD (GFR = 45-59 mL/min/1 73 square meters)  •  Stage 3B Moderate CKD (GFR = 30-44 mL/min/1 73 square meters)  •  Stage 4 Severe CKD (GFR = 15-29 mL/min/1 73 square meters)  •  Stage 5 End Stage CKD (GFR <15 mL/min/1 73 square meters)  Note: GFR calculation is accurate only with a steady state creatinine    Procalcitonin [[id number]]  (Normal) Collected: 06/03/23 0214    Lab Status: Final result Specimen: Blood from Arm, Left Updated: 06/03/23 0323     Procalcitonin 0 09 ng/ml     UA w Reflex to Microscopic w Reflex to Culture [[id number]]  (Abnormal) Collected: 06/03/23 0255    Lab Status: Final result Specimen: Urine, Indwelling Cage Catheter Updated: 06/03/23 0313     Color, UA Colorless     Clarity, UA Turbid     Specific [place], UA 1 017     pH, UA 6 0     Leukocytes, UA Large     Nitrite, UA Positive     Protein, UA Negative mg/dl      Glucose, UA >=1000 (1%) mg/dl      Ketones, UA Negative mg/dl      Urobilinogen, UA <2 0 mg/dl      Bilirubin, UA Negative     Occult Blood, UA Negative    HS Troponin 0hr (reflex protocol) [[id number]]  (Normal) Collected: 06/03/23 0214    Lab Status: Final result Specimen: Blood from Arm, Left Updated: 06/03/23 0247     hs TnI 0hr 6 ng/L     D-Dimer [[id number]]  (Normal) Collected: 06/03/23 0214    Lab Status: Final result Specimen: Blood from Arm, Left Updated: 06/03/23 0239     D-Dimer, Quant 0 46 ug/ml FEU     Narrative: In the evaluation for possible pulmonary embolism, in the appropriate (Well's Score of 4 or less) patient, the age adjusted d-dimer cutoff for this patient can be calculated as:    Age x 0 01 (in ug/mL) for Age-adjusted D-dimer exclusion threshold for a patient over 50 years      Lili Weaver [[id number]]  (Normal) Collected: 06/03/23 0214    Lab Status: Final result Specimen: Blood from Arm, Left Updated: 06/03/23 0236     Protime 12 0 seconds      INR 0 89    APTT [[id number]]  (Normal) Collected: 06/03/23 0214    Lab Status: Final result Specimen: Blood from Arm, Left Updated: 06/03/23 0236     PTT 25 seconds     CBC and differential [[id number]]  (Abnormal) Collected: 06/03/23 0214    Lab Status: Final result Specimen: Blood from Arm, Left Updated: 06/03/23 0222     WBC 15 61 Thousand/uL RBC 4 76 Million/uL      Hemoglobin 13 8 g/dL      Hematocrit 41 6 %      MCV 87 fL      MCH 29 0 pg      MCHC 33 2 g/dL      RDW 13 4 %      MPV 8 3 fL      Platelets 344 Thousands/uL      nRBC 0 /100 WBCs      Neutrophils Relative 70 %      Immat GRANS % 1 %      Lymphocytes Relative 17 %      Monocytes Relative 7 %      Eosinophils Relative 4 %      Basophils Relative 1 %      Neutrophils Absolute 10 93 Thousands/µL      Immature Grans Absolute 0 08 Thousand/uL      Lymphocytes Absolute 2 71 Thousands/µL      Monocytes Absolute 1 16 Thousand/µL      Eosinophils Absolute 0 59 Thousand/µL      Basophils Absolute 0 14 Thousands/µL     Fingerstick Glucose (POCT) [[id number]]  (Abnormal) Collected: 06/03/23 0216    Lab Status: Final result Updated: 06/03/23 0219     POC Glucose 280 mg/dl                  CT chest abdomen pelvis w contrast   Final Result by Anibal Vela MD (06/03 1414)      Findings suggestive of urinary tract infection including subtle areas of wedge-shaped nephrographic hypoenhancement in the left kidney suspicious for a mild changes of left-sided pyelonephritis    Also, the urinary bladder is not collapsed despite the    presence of an intraluminal Cage catheter balloon and there is mild uniform bladder wall thickening  No other findings to account for sepsis  Constipation with stercoral proctitis  Workstation performed: [id number]         XR chest 2 views   Final Result by Kayley Officer, MD (06/03 1240)      No acute cardiopulmonary disease                    Workstation performed: [id number]                    Procedures  Procedures         ED Course  ED Course as of 06/03/23 2008   Sat Jun 03, 2023 0224 WBC(!): 15 61   0245 D-Dimer, Quant: 0 46   0412 WBC, UA(!): Innumerable   0412 Bacteria, UA(!): Moderate   0412 LACTIC ACID(!!): 2 2   0618 T/o to [facility] pending CT abdomen and pelvis and admission       EKG: ST at 106 BPM, , QTc 430, normal axis, no ST elevation or depression as interpreted by me     EKG: ST with PAC at 106 BPM, , QTc 470, no ST elevation or depression as interpreted by me      Initial Sepsis Screening     Row Name 06/03/23 0225                Is the patient's history suggestive of a new or worsening infection? Yes (Proceed)  -JA        Suspected source of infection suspect infection, source unknown  -JA        Indicate SIRS criteria Tachycardia > 90 bpm;Leukocytosis (WBC > 52290 IJL) OR Leukopenia (WBC <4000 IJL) OR Bandemia (WBC >10% bands)  -JA        Are two or more of the above signs & symptoms of infection both present and new to the patient? Yes (Proceed)  -JA        Assess for evidence of organ dysfunction: Are any of the below criteria present within 6 hours of suspected infection and SIRS criteria that are NOT considered to be chronic conditions? --              User Key  (r) = Recorded By, (t) = Taken By, (c) = Cosigned By    [address] Name Provider Type    Edgar Lane PA-C Physician Assistant              Medical Decision Making  DDx including but not limited to: sepsis, PE, UTI, pneumonia, viral illness, intraabdominal process, ACS    Will obtain CBC, CMP, UA, POC glucose, CXR, coags  SIRs criteria met due to leukocytosis  Blood cultures, lactic acid, and procal ordered  Given unknown etiology and unknown etiology of patient's symptoms, will hold off on abx until further workup returns  UA with evidence of UTI  Suspect urosepsis; Rocephin and IVF ordered  However, given pt with chronic suprapubic catheter will obtain CT Chest, abdomen, pelvis to r/o other source     Care turned over to Anna Deshpande PA-C pending CT findings and admission  Sepsis [facility]): acute illness or injury  UTI (urinary tract infection): acute illness or injury  Amount and/or Complexity of Data Reviewed  External Data Reviewed: labs, radiology and notes  Labs: ordered  Decision-making details documented in ED Course  Radiology: ordered  Decision-making details documented in ED Course  ECG/medicine tests: ordered and independent interpretation performed  Decision-making details documented in ED Course  Risk  Prescription drug management  Decision regarding hospitalization  Disposition  Final diagnoses:   [facility] ([facility]   UTI (urinary tract infection)   Pyelonephritis     Time reflects when diagnosis was documented in both MDM as applicable and the Disposition within this note     Time User Action Codes Description Comment    6/3/2023  6:56 AM Monica Barton Add [A41 9] Sepsis ([facility] )     6/3/2023  6:56 AM Monica Barton Add [N39 0] UTI (urinary tract infection)     6/3/2023  6:56 AM Monica Barton Add [N12] Pyelonephritis     6/3/2023  9:18 AM Isabel Gallardo Add [N31 9] Neurogenic bladder     6/3/2023  9:18 AM Isabel Lockhart [Z93 [id number]] Chronic suprapubic catheter [facility])       ED Disposition     ED Disposition   Admit    Condition   Stable    Date/Time   Sat Raffaele 3, 2023  6:56 AM    Comment   Case was discussed with Marie Fuller and the patient's admission status was agreed to be Admission Status: inpatient status to the service of Dr Flower Zimmerman             Follow-up Information    None         Current Discharge Medication List      CONTINUE these medications which have NOT CHANGED    Details   albuterol (PROVENTIL HFA,VENTOLIN HFA) 90 mcg/act inhaler Inhale 2 puffs every 6 (six) hours as needed for wheezing      ALPRAZolam (XANAX) 0 25 mg tablet Take 0 25 mg by mouth 2 (two) times a day as needed for anxiety or sleep       amLODIPine-atorvastatin (CADUET) 10-80 MG per tablet Take 1 tablet by mouth daily      clopidogrel (PLAVIX) 75 mg tablet Take 1 tablet (75 mg total) by mouth daily  Refills: 0    Associated Diagnoses: Chronic suprapubic catheter ([facility] );  Neurogenic bladder; Cellulitis      Dulaglutide (Trulicity) 4 59 ST/1 9GF SOPN Inject 0 75 mg under the skin once a week      Ergocalciferol (VITAMIN D2 PO) Take 50,000 Units by mouth once a week      furosemide (LASIX) 40 mg tablet Take 40 mg by mouth daily      !! gabapentin (NEURONTIN) 300 mg capsule Take 1 capsule (300 mg total) by mouth 2 (two) times a day  Qty: 60 capsule, Refills: 0    Associated Diagnoses: Multiple sclerosis ([address] )      ! ! gabapentin (NEURONTIN) 300 mg capsule Take 2 capsules (600 mg total) by mouth daily at bedtime  Qty: 30 capsule, Refills: 0    Associated Diagnoses: Multiple sclerosis (HCC)      latanoprost (XALATAN) 0 005 % ophthalmic solution Administer 1 drop to both eyes daily at bedtime      melatonin 3 mg Take 3 mg by mouth daily at bedtime as needed      meloxicam (Mobic) 15 mg tablet Take 1 tablet (15 mg total) by mouth daily Prn pain  Qty: 20 tablet, Refills: 0    Associated Diagnoses: Calcific tendonitis of left shoulder      pantoprazole (PROTONIX) 40 mg tablet TAKE 1 TABLET TWICE DAILY 30 MINUTES BEFORE BREAKFAST AND DINNER  Qty: 180 tablet, Refills: 1    Associated Diagnoses: Gastroesophageal reflux disease without esophagitis      polyethylene glycol (MIRALAX) 17 g packet Take 17 g by mouth daily as needed      potassium chloride (Klor-Con) 10 mEq tablet Take 10 mEq by mouth 2 (two) times a day      propranolol (INDERAL LA) 60 mg 24 hr capsule Take 60 mg by mouth daily      senna-docusate sodium (SENOKOT S) 8 6-50 mg per tablet Take 2 tablets by mouth daily at bedtime      sertraline (ZOLOFT) 25 mg tablet Take 25 mg by mouth daily at bedtime      albuterol (2 5 mg/3 mL) 0 083 % nebulizer solution Take 2 5 mg by nebulization every 6 (six) hours as needed for wheezing or shortness of breath       !! - Potential duplicate medications found  Please discuss with provider  No discharge procedures on file      PDMP Review       Value Time User    PDMP Reviewed  Yes 6/3/2023  7:35 AM Trudy Block PA-C          ED Provider  Electronically Signed by           Gregorio Burks PA-C  06/03/23 2010

## 2023-06-03 NOTE — ASSESSMENT & PLAN NOTE
Lab Results   Component Value Date    HGBA1C 9 2 (H) 06/03/2023       Recent Labs     06/03/23  0216 06/03/23  1113 06/03/23  1620 06/03/23  2145   POCGLU 280* 218* 179* 167*       Blood Sugar Average: Last 72 hrs:  (P) 211   · Updated A1c demonstrates poor diabetes control with increase to 9 2%   · lantus 5 units qAM added yesterday, monitor for titration   · Hypoglycemia protocol   · ADA diet   · SSI, accuchecks   · [facility]

## 2023-06-03 NOTE — SEPSIS NOTE
"  Sepsis Note   Severiano Poncho 67 y o  male MRN: [id number]  Unit/Bed#: ED-24 Encounter: [id number]       Initial Sepsis Screening     Row Name 06/03/23 0225                Is the patient's history suggestive of a new or worsening infection? Yes (Proceed)  -JA        Suspected source of infection suspect infection, source unknown  -JA        Indicate SIRS criteria Tachycardia > 90 bpm;Leukocytosis (WBC > 32094 IJL) OR Leukopenia (WBC <4000 IJL) OR Bandemia (WBC >10% bands)  -JA        Are two or more of the above signs & symptoms of infection both present and new to the patient? Yes (Proceed)  -JA        Assess for evidence of organ dysfunction: Are any of the below criteria present within 6 hours of suspected infection and SIRS criteria that are NOT considered to be chronic conditions? --              User Key  (r) = Recorded By, (t) = Taken By, (c) = Cosigned By    Initials Name Provider Type    Griselda Dadds, PA-C Physician Assistant                Default Flowsheet Data (last 720 hours)     Sepsis Reassess     Row Name 06/03/23 0701                   Repeat Volume Status and Tissue Perfusion Assessment Performed    Date of Reassessment: 06/03/23  [facility]        Time of Reassessment: 0701  -        Sepsis Reassessment Note: Click \"NEXT\" below (NOT \"close\") to generate sepsis reassessment note  YES (proceed by clicking \"NEXT\")  -        Repeat Volume Status and Tissue Perfusion Assessment Performed --              User Key  (r) = Recorded By, (t) = Taken By, (c) = Cosigned By    [address] Name Provider Type     Monica Doshi PA-C Physician Assistant               re eval : lungs clear, pt has +1 pitting edema - pt reports this is his baseline, pulses 2+   Body mass index is 31 11 kg/m²    Wt Readings from Last 1 Encounters:   06/03/23 82 2 kg (181 lb 3 5 oz)        Ideal body weight: 59 2 kg (130 lb 8 2 oz)  Adjusted ideal body weight: 68 4 kg (150 lb 12 7 oz)  "

## 2023-06-03 NOTE — PLAN OF CARE
Problem: Potential for Falls  Goal: Patient will remain free of falls  Description: INTERVENTIONS:  - Educate patient/family on patient safety including physical limitations  - Instruct patient to call for assistance with activity   - Consult OT/PT to assist with strengthening/mobility   - Keep Call bell within reach  - Keep bed low and locked with side rails adjusted as appropriate  - Keep care items and personal belongings within reach  - Initiate and maintain comfort rounds  - Make Fall Risk Sign visible to staff    - Initiate/Maintain bed alarm  - Obtain necessary fall risk management equipment: wheelchair  - Apply yellow socks and bracelet for high fall risk patients  - Consider moving patient to room near nurses station  Outcome: Progressing     Problem: MOBILITY - ADULT  Goal: Maintain or return to baseline ADL function  Description: INTERVENTIONS:  -  Assess patient's ability to carry out ADLs; assess patient's baseline for ADL function and identify physical deficits which impact ability to perform ADLs (bathing, care of mouth/teeth, toileting, grooming, dressing, etc )  - Assess/evaluate cause of self-care deficits   - Assess range of motion  - Assess patient's mobility; develop plan if impaired  - Assess patient's need for assistive devices and provide as appropriate  - Encourage maximum independence but intervene and supervise when necessary  - Involve family in performance of ADLs  - Assess for home care needs following discharge   - Consider OT consult to assist with ADL evaluation and planning for discharge  - Provide patient education as appropriate  Outcome: Progressing     Problem: GENITOURINARY - ADULT  Goal: Maintains or returns to baseline urinary function  Description: INTERVENTIONS:  - Assess urinary function  - Encourage oral fluids to ensure adequate hydration if ordered  - Administer IV fluids as ordered to ensure adequate hydration  - Administer ordered medications as needed  - Offer frequent toileting  - Follow urinary retention protocol if ordered  Outcome: Progressing  Goal: Absence of urinary retention  Description: INTERVENTIONS:  - Assess patient’s ability to void and empty bladder  - Monitor I/O  - Bladder scan as needed  - Discuss with physician/AP medications to alleviate retention as needed  - Discuss catheterization for long term situations as appropriate  Outcome: Progressing  Goal: Urinary catheter remains patent  Description: INTERVENTIONS:  - Assess patency of urinary catheter  - If patient has a chronic dela cruz, consider changing catheter if non-functioning  - Follow guidelines for intermittent irrigation of non-functioning urinary catheter  Outcome: Progressing

## 2023-06-03 NOTE — ASSESSMENT & PLAN NOTE
· Patient presented meeting sepsis criteria in the setting of left sided pyelonephritis with chronic suprapubic dela cruz catheter from neurogenic bladder with longstanding history of MS  · Urine culture pending at this time, continue IV ceftriaxone empirically and tailor antibiotics based on cultures  · Blood cultures negative at 24 hours  · Monitor fever curve and hemodynamics

## 2023-06-03 NOTE — ASSESSMENT & PLAN NOTE
· Continue propranolol 60 mg daily with hold parameters  · Holding Lasix for now  · Monitor vital signs

## 2023-06-03 NOTE — ASSESSMENT & PLAN NOTE
· Urosepsis present on admission with CT abdomen pelvis with findings suspicious for left-sided pyelonephritis  · Reviewed previous urine culture susceptible to ceftriaxone  · We will continue with IV ceftriaxone for now, day 2   · Follow urine and blood cultures and tailor antibiotics based on cultures/susceptibilities  · Blood cultures thus far negative at 24 hours  · Monitor fever curve and hemodynamics

## 2023-06-03 NOTE — ASSESSMENT & PLAN NOTE
· Followed with Dr April Steele status post stent assisted coiling embolization of basilar artery in 2015

## 2023-06-03 NOTE — ASSESSMENT & PLAN NOTE
· Followed with Dr Dong Gorman status post stent assisted coiling embolization of basilar artery in 2015

## 2023-06-03 NOTE — H&P
"[address] [place] [address]  H&P  Name: Adelaida Obrien 67 y o  male I MRN: [id number]  Unit/Bed#: E5 -01 I Date of Admission: 6/3/2023   Date of Service: 6/3/2023 I Hospital Day: 0      Assessment/Plan   * Sepsis [facility])  Assessment & Plan  · Patient presented meeting sepsis criteria in the setting of left sided pyelonephritis with chronic suprapubic dela cruz catheter from neurogenic bladder   · Urine and blood cultures obtained in the emergency department  · Started on IV ceftriaxone we will continue for now and tailor antibiotics based on cultures  · Monitor fever curve and hemodynamics  · Lactic acidosis already resolved with 1L fluids in the emergency department    Cyst of left kidney  Assessment & Plan  · Noted on CT \"Cortical cyst at the lower pole of the left kidney, 19 mm  No urinary tract calculi  No hydronephrosis  No suspicious solid renal mass  \"     Constipation  Assessment & Plan  · Noted on CT scan for large and typical volume stool seen in the colon  · Start bowel regimen    Pyelonephritis  Assessment & Plan  · Urosepsis present on admission with CT abdomen pelvis with findings suspicious for left-sided pyelonephritis  · Reviewed previous urine culture susceptible to ceftriaxone  · We will continue with IV ceftriaxone for now  · Follow urine and blood cultures and tailor antibiotics based on cultures/susceptibilities  · Monitor fever curve and hemodynamics    Chronic diastolic congestive heart failure (HCC)  Assessment & Plan  Wt Readings from Last 3 Encounters:   06/03/23 82 2 kg (181 lb 3 5 oz)   04/14/23 81 6 kg (179 lb 14 3 oz)   02/27/23 81 6 kg (180 lb)       · Last echo reviewed from 2018: EF 83%, grade 1 diastolic dysfunction  · Maintained on Lasix 40 mg daily, will hold for now in the setting of sepsis  · Monitor volume status, intake/output      Hyponatremia  Assessment & Plan  · Na corrects to 134 for hyperglycemia     Acute metabolic encephalopathy  Assessment & Plan  · In " the setting of urosepsis POA, continue treatment of acute infection as noted above     History of CVA (cerebrovascular accident)  Assessment & Plan  · History of CVA October 2018  · Continue Plavix and Lipitor    Diabetes mellitus ([facility] [address] )  Assessment & Plan  Lab Results   Component Value Date    HGBA1C 8 7 05/10/2021       Recent Labs     06/03/23  0216   POCGLU 280*       Blood Sugar Average: Last 72 hrs:  (P) 280   · We will update A1c  · Maintained on Trulicity  · Placed on sign scale insulin, Accu-Cheks while hospitalized    Neurogenic bladder  Assessment & Plan  · Chronic suprapubic Cage catheter    Multiple sclerosis (HCC)  Assessment & Plan  · History of longstanding multiple sclerosis, wheelchair-bound with neurogenic bladder and chronic suprapubic Cage catheter  · Not on a disease modifying agent   · Continue gabapentin 300 mg bid and 600 mg at bedtime  · Follows with Neurology outpatient     Hypertension  Assessment & Plan  · Continue propranolol 60 mg daily with hold parameters  · Holding Lasix for now  · Monitor vital signs     Dyslipidemia  Assessment & Plan  · Continue statin    Chronic suprapubic catheter (HCC)  Assessment & Plan  · Neurogenic bladder with chronic suprapubic catheter  · Monitor I's/O    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HCC)  Assessment & Plan  · Followed with Dr Marissa Coates status post stent assisted coiling embolization of basilar artery in 2015     Depression  Assessment & Plan  · Continue Zoloft and Xanax as needed  · PDMP reviewed       VTE Pharmacologic Prophylaxis:   Moderate Risk (Score 3-4) - Pharmacological DVT Prophylaxis Ordered: enoxaparin (Lovenox)  Code Status: Prior level 1   Discussion with family: Updated  (brother) via phone  Anticipated Length of Stay: Patient will be admitted on an inpatient basis with an anticipated length of stay of greater than 2 midnights secondary to urosepsis      Total Time Spent on Date of Encounter in care of patient: 55 minutes This time was spent on one or more of the following: performing physical exam; counseling and coordination of care; obtaining or reviewing history; documenting in the medical record; reviewing/ordering tests, medications or procedures; communicating with other healthcare professionals and discussing with patient's family/caregivers  Chief Complaint: sepsis, pyelonephritis     History of Present Illness:  Mikel Hearn is a 67 y o  male with a PMH of multiple sclerosis, neurogenic bladder with chronic suprapubic Cage catheter , history of CVA, type II non-insulin-dependent diabetes , hypertension, depression , hyperlipidemia , chronic diastolic CHF who presents with confusion  He lives at home with his brother and sister  He is wheel chair bound from [address]  patient and family state he was more confused this morning therefore they summoned EMS  He does have a history of urinary tract infections  He has a chronic suprapubic Cage catheter which gets exchanged every month  In the emergency department he was noted to meet sepsis criteria with CT concerning for left-sided pyelonephritis  Patient was started on IV antibiotics and given 1 L fluid bolus  He is now currently alert and oriented to person place month  Needs reorientation to year  He otherwise denies any other acute complaints shortness of breath, chest pain, fevers or chills  He confirms level 1 CODE STATUS on admission  Patient will be admitted for treatment of acute infection  Urine and blood cultures have been obtained  His brother was notified on admission who is going to come and visit later  Review of Systems:  Review of Systems   Constitutional: Negative for chills and fever  Respiratory: Positive for shortness of breath  Cardiovascular: Negative for chest pain  Gastrointestinal: Negative for abdominal pain, nausea and vomiting  Psychiatric/Behavioral: Positive for confusion     All other systems reviewed and are negative  Past Medical and Surgical History:   Past Medical History:   Diagnosis Date   • Acute laryngitis    • Acute nonsuppurative otitis media, unspecified laterality    • Arm weakness    • Arthritis    • 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HCC)    • Bladder infection    • Bronchitis    • Constipation    • Cough    • Diabetes mellitus ([facility])    • Dizziness    • Dysfunction of eustachian tube    • Erectile dysfunction of non-organic origin    • Fatigue    • Glaucoma    • Hiatal hernia    • Hypertension    • Imbalance    • Leg muscle spasm    • MS (multiple sclerosis) ([facility])    • Nephrolithiasis    • Neurogenic bladder    • No natural teeth    • Sinus pain    • Spinal stenosis    • Strain of thoracic region    • Stroke [facility])    • Suprapubic catheter ([facility] [address] )        Past Surgical History:   Procedure Laterality Date   • APPENDECTOMY     • BRAIN SURGERY      Coil placed in aneurysm   • CYSTOSCOPY     • CYSTOSCOPY     • CYSTOSCOPY  06/11/2018   • CYSTOSCOPY  01/15/2021   • EYE SURGERY      transscleral cyclophotocoagulation noncontact YAG laser   • MYRINGOTOMY      with ventilation tube insertion   • MD LITHOLAPAXY SMPL/SM <2 5 CM N/A 5/7/2019    Procedure: CYSTOSCOPY, holmium laser litholapaxy of bladder stones, EXCHANGE OF SP TUBE;  Surgeon: Nini Bruce MD;  Location: [facility];  Service: Urology   • SUPRAPUBIC CATHETER INSERTION         Meds/Allergies:  Prior to Admission medications    Medication Sig Start Date End Date Taking?  Authorizing Provider   albuterol (2 5 mg/3 mL) 0 083 % nebulizer solution Take 2 5 mg by nebulization every 6 (six) hours as needed for wheezing or shortness of breath    Historical Provider, MD   albuterol (PROVENTIL HFA,VENTOLIN HFA) 90 mcg/act inhaler Inhale 2 puffs every 6 (six) hours as needed for wheezing    Historical Provider, MD   ALPRAZolam (XANAX) 0 25 mg tablet Take 0 25 mg by mouth 2 (two) times a day as needed for anxiety or sleep     Historical Provider, MD amLODIPine-atorvastatin (CADUET) 10-80 MG per tablet Take 1 tablet by mouth daily    Historical Provider, MD   clopidogrel (PLAVIX) 75 mg tablet Take 1 tablet (75 mg total) by mouth daily 10/27/18   Char Larsen MD   Dulaglutide (Trulicity) 0 37 CU/9 4MK SOPN Inject 0 75 mg under the skin once a week    Historical Provider, MD   Ergocalciferol (VITAMIN D2 PO) Take 50,000 Units by mouth once a week    Historical Provider, MD   furosemide (LASIX) 40 mg tablet Take 40 mg by mouth daily    Historical Provider, MD   gabapentin (NEURONTIN) 300 mg capsule Take 1 capsule (300 mg total) by mouth 2 (two) times a day 6/3/21   Laura Vazquez DO   gabapentin (NEURONTIN) 300 mg capsule Take 2 capsules (600 mg total) by mouth daily at bedtime 6/3/21   Laura Vazquez DO   latanoprost (XALATAN) 0 005 % ophthalmic solution Administer 1 drop to both eyes daily at bedtime    Historical Provider, MD   melatonin 3 mg Take 3 mg by mouth daily at bedtime as needed    Historical Provider, MD   meloxicam (Mobic) 15 mg tablet Take 1 tablet (15 mg total) by mouth daily Prn pain 5/7/22   Harley Mancera MD   pantoprazole (PROTONIX) 40 mg tablet TAKE 1 TABLET TWICE DAILY 30 MINUTES BEFORE BREAKFAST AND DINNER  3/13/18   Sheldon Chan DO   polyethylene glycol (MIRALAX) 17 g packet Take 17 g by mouth daily as needed    Historical Provider, MD   potassium chloride (Klor-Con) 10 mEq tablet Take 10 mEq by mouth 2 (two) times a day    Historical Provider, MD   propranolol (INDERAL LA) 60 mg 24 hr capsule Take 60 mg by mouth daily    Historical Provider, MD   senna-docusate sodium (SENOKOT S) 8 6-50 mg per tablet Take 2 tablets by mouth daily at bedtime    Historical Provider, MD   sertraline (ZOLOFT) 25 mg tablet Take 25 mg by mouth daily at bedtime    Historical Provider, MD     chart review   Allergies:    Allergies   Allergen Reactions   • Cephalexin Rash       Social History:  Marital Status: Single   Occupation:   Patient Pre-hospital Living Situation: Home  Patient Pre-hospital Level of Mobility: wheelchair   Patient Pre-hospital Diet Restrictions:   Substance Use History:   Social History     Substance and Sexual Activity   Alcohol Use Not Currently     Social History     Tobacco Use   Smoking Status Former   • Packs/day: 0 50   • Years: 31 00   • Total pack years: 15 50   • Types: Cigarettes   • Start date: 55   • Quit date: 18   • Years since quittin 4   Smokeless Tobacco Never     Social History     Substance and Sexual Activity   Drug Use No       Family History:  Family History   Problem Relation Age of Onset   • Heart attack Mother    • Stroke Mother    • Heart attack Father    • Anuerysm Father         In Stomach        Physical Exam:     Vitals:   Blood Pressure: 126/73 (23)  Pulse: 92 (23)  Temperature: 97 7 °F (36 5 °C) (23)  Temp Source: Oral (23)  Respirations: 16 (23)  Weight - Scale: 82 2 kg (181 lb 3 5 oz) (23)  SpO2: 96 % (23)    Physical Exam  Vitals and nursing note reviewed  Constitutional:       General: He is not in acute distress  Appearance: He is not toxic-appearing or diaphoretic  Cardiovascular:      Rate and Rhythm: Normal rate and regular rhythm  Pulmonary:      Effort: Pulmonary effort is normal  No respiratory distress  Breath sounds: Normal breath sounds  No wheezing  Abdominal:      General: Bowel sounds are normal  There is no distension  Palpations: Abdomen is soft  Tenderness: There is no abdominal tenderness  There is no guarding  Genitourinary:     Comments: Suprapubic catheter in place no bag currently connected  Musculoskeletal:      Right lower leg: Edema present  Left lower leg: Edema present  Skin:     General: Skin is warm  Coloration: Skin is pale  Neurological:      Mental Status: He is alert        Comments: Can not lift legs off bed or bend legs (baseline), wiggles toes better on left side than right, can roll himself to one side with hodlign onto bed rails  oriented to person, place, month, needs reorientation to year  Answers questions appropriately   Psychiatric:         Mood and Affect: Mood normal           Additional Data:     Lab Results:  Results from last 7 days   Lab Units 06/03/23  0214   EOS PCT % 4   HEMATOCRIT % 41 6   HEMOGLOBIN g/dL 13 8   LYMPHS PCT % 17   MONOS PCT % 7   NEUTROS PCT % 70   PLATELETS Thousands/uL 317   WBC Thousand/uL 15 61*     Results from last 7 days   Lab Units 06/03/23  0214   ANION GAP mmol/L 10   ALBUMIN g/dL 3 6   ALK PHOS U/L 93   ALT U/L 19   AST U/L 13   BUN mg/dL 10   CALCIUM mg/dL 9 1   CHLORIDE mmol/L 96   CO2 mmol/L 23   CREATININE mg/dL 0 76   GLUCOSE RANDOM mg/dL 291*   POTASSIUM mmol/L 3 8   SODIUM mmol/L 129*   TOTAL BILIRUBIN mg/dL 0 46     Results from last 7 days   Lab Units 06/03/23  0214   INR  0 89     Results from last 7 days   Lab Units 06/03/23  0216   POC GLUCOSE mg/dl 280*         Results from last 7 days   Lab Units 06/03/23  0536 06/03/23  0251 06/03/23  0214   LACTIC ACID mmol/L 1 6 2 2*  --    PROCALCITONIN ng/ml  --   --  0 09       Lines/Drains:  Invasive Devices     Peripheral Intravenous Line  Duration           Peripheral IV 06/03/23 Dorsal (posterior); Left Hand <1 day    Peripheral IV 06/03/23 Right Antecubital <1 day          Drain  Duration           Suprapubic Catheter Latex 24 Fr  868 days                    Imaging: Reviewed radiology reports from this admission including: abdominal/pelvic CT  CT chest abdomen pelvis w contrast   Final Result by Vikki Carmona MD (06/03 2417)      Findings suggestive of urinary tract infection including subtle areas of wedge-shaped nephrographic hypoenhancement in the left kidney suspicious for a mild changes of left-sided pyelonephritis    Also, the urinary bladder is not collapsed despite the    presence of an intraluminal Cage catheter balloon and there is mild uniform bladder wall thickening  No other findings to account for sepsis  Constipation with stercoral proctitis  Workstation performed: [id number]         XR chest 2 views    (Results Pending)       EKG and Other Studies Reviewed on Admission:   · EKG: Sinus Tachycardia    ** Please Note: This note has been constructed using a voice recognition system   **

## 2023-06-03 NOTE — ASSESSMENT & PLAN NOTE
Wt Readings from Last 3 Encounters:   06/03/23 77 4 kg (170 lb 10 2 oz)   04/14/23 81 6 kg (179 lb 14 3 oz)   02/27/23 81 6 kg (180 lb)       · Last echo reviewed from 2018: EF 71%, grade 1 diastolic dysfunction  · Maintained on Lasix 40 mg daily, will hold for now in the setting of sepsis  · Possibly resume in 24 hours   · Monitor volume status, intake/output

## 2023-06-03 NOTE — ASSESSMENT & PLAN NOTE
· Urosepsis present on admission with CT abdomen pelvis with findings suspicious for left-sided pyelonephritis  · Reviewed previous urine culture susceptible to ceftriaxone  · We will continue with IV ceftriaxone for now  · Follow urine and blood cultures and tailor antibiotics based on cultures/susceptibilities  · Monitor fever curve and hemodynamics

## 2023-06-03 NOTE — ASSESSMENT & PLAN NOTE
· In the setting of urosepsis POA, continue treatment of acute infection as noted above   · On arrival: Oriented to person, place, month on arrival needed reorientation to year   · Monitor mentation and for acute delirium

## 2023-06-04 PROBLEM — E87.1 HYPONATREMIA: Status: RESOLVED | Noted: 2023-04-11 | Resolved: 2023-06-04

## 2023-06-04 LAB
ALBUMIN SERPL BCP-MCNC: 3 G/DL (ref 3.5–5)
ALP SERPL-CCNC: 86 U/L (ref 34–104)
ALT SERPL W P-5'-P-CCNC: 15 U/L (ref 7–52)
ANION GAP SERPL CALCULATED.3IONS-SCNC: 7 MMOL/L (ref 4–13)
AST SERPL W P-5'-P-CCNC: 13 U/L (ref 13–39)
BASOPHILS # BLD AUTO: 0.09 THOUSANDS/ÂΜL (ref 0–0.1)
BASOPHILS NFR BLD AUTO: 1 % (ref 0–1)
BILIRUB SERPL-MCNC: 0.75 MG/DL (ref 0.2–1)
BUN SERPL-MCNC: 8 MG/DL (ref 5–25)
CALCIUM ALBUM COR SERPL-MCNC: 9.3 MG/DL (ref 8.3–10.1)
CALCIUM SERPL-MCNC: 8.5 MG/DL (ref 8.4–10.2)
CHLORIDE SERPL-SCNC: 102 MMOL/L (ref 96–108)
CO2 SERPL-SCNC: 23 MMOL/L (ref 21–32)
CREAT SERPL-MCNC: 0.89 MG/DL (ref 0.6–1.3)
EOSINOPHIL # BLD AUTO: 0.16 THOUSAND/ÂΜL (ref 0–0.61)
EOSINOPHIL NFR BLD AUTO: 1 % (ref 0–6)
ERYTHROCYTE [DISTWIDTH] IN BLOOD BY AUTOMATED COUNT: 14 % (ref 11.6–15.1)
GFR SERPL CREATININE-BSD FRML MDRD: 84 ML/MIN/1.73SQ M
GLUCOSE SERPL-MCNC: 146 MG/DL (ref 65–140)
GLUCOSE SERPL-MCNC: 152 MG/DL (ref 65–140)
GLUCOSE SERPL-MCNC: 164 MG/DL (ref 65–140)
GLUCOSE SERPL-MCNC: 228 MG/DL (ref 65–140)
GLUCOSE SERPL-MCNC: 324 MG/DL (ref 65–140)
HCT VFR BLD AUTO: 36.7 % (ref 36.5–49.3)
HGB BLD-MCNC: 11.8 G/DL (ref 12–17)
IMM GRANULOCYTES # BLD AUTO: 0.06 THOUSAND/UL (ref 0–0.2)
IMM GRANULOCYTES NFR BLD AUTO: 1 % (ref 0–2)
LYMPHOCYTES # BLD AUTO: 2.9 THOUSANDS/ÂΜL (ref 0.6–4.47)
LYMPHOCYTES NFR BLD AUTO: 22 % (ref 14–44)
MCH RBC QN AUTO: 28.7 PG (ref 26.8–34.3)
MCHC RBC AUTO-ENTMCNC: 32.2 G/DL (ref 31.4–37.4)
MCV RBC AUTO: 89 FL (ref 82–98)
MONOCYTES # BLD AUTO: 1.09 THOUSAND/ÂΜL (ref 0.17–1.22)
MONOCYTES NFR BLD AUTO: 8 % (ref 4–12)
NEUTROPHILS # BLD AUTO: 8.88 THOUSANDS/ÂΜL (ref 1.85–7.62)
NEUTS SEG NFR BLD AUTO: 67 % (ref 43–75)
NRBC BLD AUTO-RTO: 0 /100 WBCS
PLATELET # BLD AUTO: 263 THOUSANDS/UL (ref 149–390)
PMV BLD AUTO: 8.5 FL (ref 8.9–12.7)
POTASSIUM SERPL-SCNC: 3.6 MMOL/L (ref 3.5–5.3)
PROT SERPL-MCNC: 5.9 G/DL (ref 6.4–8.4)
RBC # BLD AUTO: 4.11 MILLION/UL (ref 3.88–5.62)
SODIUM SERPL-SCNC: 132 MMOL/L (ref 135–147)
WBC # BLD AUTO: 13.18 THOUSAND/UL (ref 4.31–10.16)

## 2023-06-04 PROCEDURE — 80053 COMPREHEN METABOLIC PANEL: CPT | Performed by: PHYSICIAN ASSISTANT

## 2023-06-04 PROCEDURE — 85025 COMPLETE CBC W/AUTO DIFF WBC: CPT | Performed by: PHYSICIAN ASSISTANT

## 2023-06-04 PROCEDURE — 82948 REAGENT STRIP/BLOOD GLUCOSE: CPT

## 2023-06-04 PROCEDURE — 99232 SBSQ HOSP IP/OBS MODERATE 35: CPT | Performed by: HOSPITALIST

## 2023-06-04 RX ORDER — INSULIN LISPRO 100 [IU]/ML
3 INJECTION, SOLUTION INTRAVENOUS; SUBCUTANEOUS
Status: DISCONTINUED | OUTPATIENT
Start: 2023-06-04 | End: 2023-06-06 | Stop reason: HOSPADM

## 2023-06-04 RX ORDER — INSULIN GLARGINE 100 [IU]/ML
8 INJECTION, SOLUTION SUBCUTANEOUS EVERY MORNING
Status: DISCONTINUED | OUTPATIENT
Start: 2023-06-05 | End: 2023-06-06 | Stop reason: HOSPADM

## 2023-06-04 RX ADMIN — LATANOPROST 1 DROP: 50 SOLUTION OPHTHALMIC at 22:46

## 2023-06-04 RX ADMIN — GABAPENTIN 300 MG: 300 CAPSULE ORAL at 17:39

## 2023-06-04 RX ADMIN — PROPRANOLOL HYDROCHLORIDE 60 MG: 60 CAPSULE, EXTENDED RELEASE ORAL at 08:12

## 2023-06-04 RX ADMIN — INSULIN LISPRO 1 UNITS: 100 INJECTION, SOLUTION INTRAVENOUS; SUBCUTANEOUS at 07:48

## 2023-06-04 RX ADMIN — ENOXAPARIN SODIUM 40 MG: 100 INJECTION SUBCUTANEOUS at 08:12

## 2023-06-04 RX ADMIN — AMLODIPINE BESYLATE 10 MG: 10 TABLET ORAL at 08:11

## 2023-06-04 RX ADMIN — GABAPENTIN 300 MG: 300 CAPSULE ORAL at 08:11

## 2023-06-04 RX ADMIN — ACETAMINOPHEN 325MG 650 MG: 325 TABLET ORAL at 17:39

## 2023-06-04 RX ADMIN — SODIUM CHLORIDE 75 ML/HR: 0.9 INJECTION, SOLUTION INTRAVENOUS at 01:49

## 2023-06-04 RX ADMIN — CEFTRIAXONE 1000 MG: 10 INJECTION, POWDER, FOR SOLUTION INTRAVENOUS at 05:08

## 2023-06-04 RX ADMIN — ACETAMINOPHEN 325MG 650 MG: 325 TABLET ORAL at 01:49

## 2023-06-04 RX ADMIN — SENNOSIDES AND DOCUSATE SODIUM 2 TABLET: 8.6; 5 TABLET ORAL at 22:45

## 2023-06-04 RX ADMIN — PANTOPRAZOLE SODIUM 40 MG: 40 TABLET, DELAYED RELEASE ORAL at 05:08

## 2023-06-04 RX ADMIN — INSULIN LISPRO 3 UNITS: 100 INJECTION, SOLUTION INTRAVENOUS; SUBCUTANEOUS at 11:47

## 2023-06-04 RX ADMIN — GABAPENTIN 600 MG: 300 CAPSULE ORAL at 22:45

## 2023-06-04 RX ADMIN — INSULIN LISPRO 3 UNITS: 100 INJECTION, SOLUTION INTRAVENOUS; SUBCUTANEOUS at 17:40

## 2023-06-04 RX ADMIN — INSULIN LISPRO 5 UNITS: 100 INJECTION, SOLUTION INTRAVENOUS; SUBCUTANEOUS at 10:59

## 2023-06-04 RX ADMIN — SERTRALINE HYDROCHLORIDE 25 MG: 25 TABLET ORAL at 22:45

## 2023-06-04 RX ADMIN — INSULIN LISPRO 2 UNITS: 100 INJECTION, SOLUTION INTRAVENOUS; SUBCUTANEOUS at 22:46

## 2023-06-04 RX ADMIN — CLOPIDOGREL BISULFATE 75 MG: 75 TABLET ORAL at 08:11

## 2023-06-04 RX ADMIN — ATORVASTATIN CALCIUM 80 MG: 80 TABLET, FILM COATED ORAL at 17:39

## 2023-06-04 RX ADMIN — INSULIN GLARGINE 5 UNITS: 100 INJECTION, SOLUTION SUBCUTANEOUS at 08:11

## 2023-06-04 NOTE — UTILIZATION REVIEW
Notification of Unplanned, Urgent, or   Emergency Inpatient Admission   [address] [address] [address] [facility]  Tax ID: [id number]  NPI: [id number]  Place of Service: [address] [place], [place] [address]y  [address]  Admission Level of Care: Inpatient  Place of Service Code: 24     Attending Physician Information  Attending Name and NPI#: Anna Meehan [[id number]]  Phone: [phone]     Admission Information  Inpatient Admission Date/Time: 6/3/23  6:57 AM  Discharge Date/Time: No discharge date for patient encounter  Admitting Diagnosis Code/Description:  Palpitations [R00 2]  UTI (urinary tract infection) [N39 0]  Pyelonephritis [N12]  Sepsis ([facility] [address] ) [A41 9]     Utilization Review Contact  Tracy Lynn Utilization   Phone: [phone]  Fax: [phone]  Email: [email or web address]  Contact for approvals/pending authorizations, clinical reviews, and discharge  Physician Advisory Services Contact  Medical Necessity Denial & Frkc-fj-Hhhy Discussion  Phone: [phone]  Fax: [phone]  Email: [email or web address]

## 2023-06-04 NOTE — PROGRESS NOTES
"[address] [place] [address]  Progress Note  Name: Kamar Gloria  MRN: [id number]  Unit/Bed#: E5 -01 I Date of Admission: 6/3/2023   Date of Service: 6/4/2023 I Hospital Day: 1    Assessment/Plan   * Sepsis [facility])  Assessment & Plan  · Patient presented meeting sepsis criteria in the setting of left sided pyelonephritis with chronic suprapubic dela cruz catheter from neurogenic bladder with longstanding history of MS  · Urine culture pending at this time, continue IV ceftriaxone empirically and tailor antibiotics based on cultures  · Blood cultures negative at 24 hours  · Monitor fever curve and hemodynamics      Cyst of left kidney  Assessment & Plan  · Noted on CT \"Cortical cyst at the lower pole of the left kidney, 19 mm  No urinary tract calculi  No hydronephrosis  No suspicious solid renal mass  \"     Constipation  Assessment & Plan  · Noted on CT scan for large and typical volume stool seen in the colon  · bowel regimen    Pyelonephritis  Assessment & Plan  · Urosepsis present on admission with CT abdomen pelvis with findings suspicious for left-sided pyelonephritis  · Reviewed previous urine culture susceptible to ceftriaxone  · We will continue with IV ceftriaxone for now, day 2   · Follow urine and blood cultures and tailor antibiotics based on cultures/susceptibilities  · Blood cultures thus far negative at 24 hours  · Monitor fever curve and hemodynamics    Chronic diastolic congestive heart failure (HCC)  Assessment & Plan  Wt Readings from Last 3 Encounters:   06/03/23 77 4 kg (170 lb 10 2 oz)   04/14/23 81 6 kg (179 lb 14 3 oz)   02/27/23 81 6 kg (180 lb)       · Last echo reviewed from 2018: EF 94%, grade 1 diastolic dysfunction  · Maintained on Lasix 40 mg daily, will hold for now in the setting of sepsis  · Possibly resume in 24 hours   · Monitor volume status, intake/output      Acute metabolic encephalopathy  Assessment & Plan  · In the setting of urosepsis POA, continue treatment " of acute infection as noted above   · On arrival: Oriented to person, place, month on arrival needed reorientation to year   · Monitor mentation and for acute delirium     History of CVA (cerebrovascular accident)  Assessment & Plan  · History of CVA October 2018  · Continue Plavix and Lipitor    Diabetes mellitus [facility])  Assessment & Plan  Lab Results   Component Value Date    HGBA1C 9 2 (H) 06/03/2023       Recent Labs     06/03/23  0216 06/03/23  1113 06/03/23  1620 06/03/23  2145   POCGLU 280* 218* 179* 167*       Blood Sugar Average: Last 72 hrs:  (P) 211   · Updated A1c demonstrates poor diabetes control with increase to 9 2%   · lantus 5 units qAM added yesterday, monitor for titration   · Hypoglycemia protocol   · ADA diet   · SSI, accuchecks   · Maintained on Trulicity outpatient       Neurogenic bladder  Assessment & Plan  · Chronic suprapubic Cage catheter    Multiple sclerosis (HCC)  Assessment & Plan  · History of longstanding multiple sclerosis, wheelchair-bound with neurogenic bladder and chronic suprapubic Cage catheter  · Not on a disease modifying agent   · Continue gabapentin 300 mg bid and 600 mg at bedtime  · Follows with Neurology outpatient     Hypertension  Assessment & Plan  · Continue propranolol 60 mg daily with hold parameters  · Holding Lasix for now  · Monitor vital signs     Dyslipidemia  Assessment & Plan  · Continue statin    Chronic suprapubic catheter (HCC)  Assessment & Plan  · Neurogenic bladder with chronic suprapubic catheter  · Urology consulted for exchange consideration   · Monitor I's/O    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HCC)  Assessment & Plan  · Followed with Dr Laverna Lesches status post stent assisted coiling embolization of basilar artery in 2015     Depression  Assessment & Plan  · Continue Zoloft and Xanax as needed  · PDMP reviewed    Hyponatremia-resolved as of 6/4/2023  Assessment & Plan  · Sodium corrected to 134 for hyperglycemia on admission                VTE Pharmacologic Prophylaxis: VTE Score: 5 High Risk (Score >/= 5) - Pharmacological DVT Prophylaxis Ordered: enoxaparin (Lovenox)  Sequential Compression Devices Ordered  Patient Centered Rounds: I performed bedside rounds with nursing staff today  Discussions with Specialists or Other Care Team Provider: will touch base with urology     Education and Discussions with Family / Patient: called brother left VM   Total Time Spent on Date of Encounter in care of patient: 25 minutes This time was spent on one or more of the following: performing physical exam; counseling and coordination of care; obtaining or reviewing history; documenting in the medical record; reviewing/ordering tests, medications or procedures; communicating with other healthcare professionals and discussing with patient's family/caregivers  Current Length of Stay: 1 day(s)  Current Patient Status: Inpatient   Certification Statement: The patient will continue to require additional inpatient hospital stay due to Sepsis, pyelonephritis  Discharge Plan: Anticipate discharge in 48 hrs to home  Code Status: Level 1 - Full Code    Subjective:   Patient doing well today without acute complaints  Suprapubic Cage attached to drainage draining clear yellow urine  No chest pain or shortness of breath  No fevers or chills    Objective:     Vitals:   Temp (24hrs), Av 5 °F (37 5 °C), Min:98 1 °F (36 7 °C), Max:101 °F (38 3 °C)    Temp:  [98 1 °F (36 7 °C)-101 °F (38 3 °C)] 98 2 °F (36 8 °C)  HR:  [] 96  Resp:  [16-18] 16  BP: (106-159)/() 142/65  SpO2:  [91 %-96 %] 91 %  Body mass index is 28 53 kg/m²  Input and Output Summary (last 24 hours): Intake/Output Summary (Last 24 hours) at 2023 0719  Last data filed at 2023 0933  Gross per 24 hour   Intake 236 25 ml   Output 2500 ml   Net -2263 75 ml       Physical Exam:   Physical Exam  Vitals and nursing note reviewed     Cardiovascular:      Rate and Rhythm: Normal rate and regular rhythm  Pulmonary:      Effort: Pulmonary effort is normal  No respiratory distress  Breath sounds: Normal breath sounds  Abdominal:      General: Bowel sounds are normal       Palpations: Abdomen is soft  Genitourinary:     Comments: Suprapubic Cage draining clear yellow urine  Musculoskeletal:      Right lower leg: Edema present  Left lower leg: Edema present  Comments: Unna boot bilaterally   Skin:     Coloration: Skin is pale  Neurological:      Mental Status: He is alert  Mental status is at baseline  Psychiatric:         Mood and Affect: Mood normal           Additional Data:     Labs:  Results from last 7 days   Lab Units 06/04/23  0513   EOS PCT % 1   HEMATOCRIT % 36 7   HEMOGLOBIN g/dL 11 8*   LYMPHS PCT % 22   MONOS PCT % 8   NEUTROS PCT % 67   PLATELETS Thousands/uL 263   WBC Thousand/uL 13 18*     Results from last 7 days   Lab Units 06/04/23  0513   ANION GAP mmol/L 7   ALBUMIN g/dL 3 0*   ALK PHOS U/L 86   ALT U/L 15   AST U/L 13   BUN mg/dL 8   CALCIUM mg/dL 8 5   CHLORIDE mmol/L 102   CO2 mmol/L 23   CREATININE mg/dL 0 89   GLUCOSE RANDOM mg/dL 164*   POTASSIUM mmol/L 3 6   SODIUM mmol/L 132*   TOTAL BILIRUBIN mg/dL 0 75     Results from last 7 days   Lab Units 06/03/23  0214   INR  0 89     Results from last 7 days   Lab Units 06/03/23  2145 06/03/23  1620 06/03/23  1113 06/03/23  0216   POC GLUCOSE mg/dl 167* 179* 218* 280*     Results from last 7 days   Lab Units 06/03/23  0214   HEMOGLOBIN A1C % 9 2*     Results from last 7 days   Lab Units 06/03/23  0536 06/03/23  0251 06/03/23  0214   LACTIC ACID mmol/L 1 6 2 2*  --    PROCALCITONIN ng/ml  --   --  0 09       Lines/Drains:  Invasive Devices     Peripheral Intravenous Line  Duration           Peripheral IV 06/03/23 Dorsal (posterior); Left Hand 1 day    Peripheral IV 06/03/23 Right Antecubital 1 day          Drain  Duration           Suprapubic Catheter Latex 24 Fr  869 days                      Imaging: Reviewed radiology reports from this admission including: abdominal/pelvic CT    Recent Cultures (last 7 days):   Results from last 7 days   Lab Units 06/03/23  0252 06/03/23  0251   BLOOD CULTURE  No Growth at 24 hrs  No Growth at 24 hrs         Last 24 Hours Medication List:   Current Facility-Administered Medications   Medication Dose Route Frequency Provider Last Rate   • acetaminophen  650 mg Oral Q6H PRN Ronny Benjamin PA-C     • albuterol  2 5 mg Nebulization Q6H PRN Meme Aguilar PA-C     • ALPRAZolam  0 25 mg Oral BID PRN Ronny Benjamin PA-C     • amLODIPine  10 mg Oral Daily Meme Aguilar PA-C     • atorvastatin  80 mg Oral Daily With [place] LipomaSHRAI     • calcium carbonate  1,000 mg Oral Daily PRN Ronny Benjamin PA-C     • cefTRIAXone  1,000 mg Intravenous Q24H Meme Aguilar PA-C 1,000 mg (06/04/23 1191)   • clopidogrel  75 mg Oral Daily Meme Aguilar PA-C     • enoxaparin  40 mg Subcutaneous Daily Meme Aguilar PA-C     • gabapentin  300 mg Oral BID Meme Aguilar PA-C     • gabapentin  600 mg Oral HS Meme Aguilar PA-C     • insulin glargine  5 Units Subcutaneous QAM Meme Aguilar PA-C     • insulin lispro  1-6 Units Subcutaneous TID AC Meme Aguilar PA-C     • insulin lispro  1-6 Units Subcutaneous HS Meme Aguilar PA-C     • latanoprost  1 drop Both Eyes HS Meme Aguilar PA-C     • melatonin  3 mg Oral HS PRN Ronny Benjamin PA-C     • ondansetron  4 mg Intravenous Q6H PRN Meme Aguilar PA-C     • pantoprazole  40 mg Oral Early Morning Meme Aguilar PA-C     • polyethylene glycol  17 g Oral Daily PRN Ronny Benjamin PA-C     • propranolol  60 mg Oral Daily Meme Aguilar PA-C     • senna-docusate sodium  2 tablet Oral HS Meme Aguilar PA-C     • sertraline  25 mg Oral HS Meme Aguilar PA-C     • sodium chloride  75 mL/hr Intravenous Continuous Ronny Benjamin PA-C 75 mL/hr (06/04/23 0149)        Today, Patient Was Seen By: Duarte Samayoa SHARI Aguilar    **Please Note: This note may have been constructed using a voice recognition system  **

## 2023-06-04 NOTE — PLAN OF CARE
Problem: Potential for Falls  Goal: Patient will remain free of falls  Description: INTERVENTIONS:  - Educate patient/family on patient safety including physical limitations  - Instruct patient to call for assistance with activity   - Consult OT/PT to assist with strengthening/mobility   - Keep Call bell within reach  - Keep bed low and locked with side rails adjusted as appropriate  - Keep care items and personal belongings within reach  - Initiate and maintain comfort rounds  - Make Fall Risk Sign visible to staff  - Apply yellow socks and bracelet for high fall risk patients  - Consider moving patient to room near nurses station  Outcome: Progressing     Problem: MOBILITY - ADULT  Goal: Maintain or return to baseline ADL function  Description: INTERVENTIONS:  -  Assess patient's ability to carry out ADLs; assess patient's baseline for ADL function and identify physical deficits which impact ability to perform ADLs (bathing, care of mouth/teeth, toileting, grooming, dressing, etc )  - Assess/evaluate cause of self-care deficits   - Assess range of motion  - Assess patient's mobility; develop plan if impaired  - Assess patient's need for assistive devices and provide as appropriate  - Encourage maximum independence but intervene and supervise when necessary  - Involve family in performance of ADLs  - Assess for home care needs following discharge   - Consider OT consult to assist with ADL evaluation and planning for discharge  - Provide patient education as appropriate  Outcome: Progressing     Problem: GENITOURINARY - ADULT  Goal: Maintains or returns to baseline urinary function  Description: INTERVENTIONS:  - Assess urinary function  - Encourage oral fluids to ensure adequate hydration if ordered  - Administer IV fluids as ordered to ensure adequate hydration  - Administer ordered medications as needed  - Offer frequent toileting  - Follow urinary retention protocol if ordered  Outcome: Progressing  Goal: Absence of urinary retention  Description: INTERVENTIONS:  - Assess patient’s ability to void and empty bladder  - Monitor I/O  - Bladder scan as needed  - Discuss with physician/AP medications to alleviate retention as needed  - Discuss catheterization for long term situations as appropriate  Outcome: Progressing  Goal: Urinary catheter remains patent  Description: INTERVENTIONS:  - Assess patency of urinary catheter  - If patient has a chronic dela cruz, consider changing catheter if non-functioning  - Follow guidelines for intermittent irrigation of non-functioning urinary catheter  Outcome: Progressing     Problem: Prexisting or High Potential for Compromised Skin Integrity  Goal: Skin integrity is maintained or improved  Description: INTERVENTIONS:  - Identify patients at risk for skin breakdown  - Assess and monitor skin integrity  - Assess and monitor nutrition and hydration status  - Monitor labs   - Assess for incontinence   - Turn and reposition patient  - Assist with mobility/ambulation  - Relieve pressure over bony prominences  - Avoid friction and shearing  - Provide appropriate hygiene as needed including keeping skin clean and dry  - Evaluate need for skin moisturizer/barrier cream  - Collaborate with interdisciplinary team   - Patient/family teaching  - Consider wound care consult   Outcome: Progressing

## 2023-06-05 ENCOUNTER — TELEPHONE (OUTPATIENT)
Dept: OTHER | Facility: HOSPITAL | Age: 73
End: 2023-06-05

## 2023-06-05 PROBLEM — N12 PYELONEPHRITIS: Status: RESOLVED | Noted: 2023-06-03 | Resolved: 2023-06-05

## 2023-06-05 PROBLEM — N28.1 CYST OF LEFT KIDNEY: Status: RESOLVED | Noted: 2023-06-03 | Resolved: 2023-06-05

## 2023-06-05 LAB
ANION GAP SERPL CALCULATED.3IONS-SCNC: 6 MMOL/L (ref 4–13)
BASOPHILS # BLD AUTO: 0.08 THOUSANDS/ÂΜL (ref 0–0.1)
BASOPHILS NFR BLD AUTO: 1 % (ref 0–1)
BUN SERPL-MCNC: 7 MG/DL (ref 5–25)
CALCIUM SERPL-MCNC: 8.8 MG/DL (ref 8.4–10.2)
CHLORIDE SERPL-SCNC: 104 MMOL/L (ref 96–108)
CO2 SERPL-SCNC: 23 MMOL/L (ref 21–32)
CREAT SERPL-MCNC: 0.83 MG/DL (ref 0.6–1.3)
EOSINOPHIL # BLD AUTO: 0.42 THOUSAND/ÂΜL (ref 0–0.61)
EOSINOPHIL NFR BLD AUTO: 4 % (ref 0–6)
ERYTHROCYTE [DISTWIDTH] IN BLOOD BY AUTOMATED COUNT: 13.7 % (ref 11.6–15.1)
GFR SERPL CREATININE-BSD FRML MDRD: 87 ML/MIN/1.73SQ M
GLUCOSE SERPL-MCNC: 155 MG/DL (ref 65–140)
GLUCOSE SERPL-MCNC: 162 MG/DL (ref 65–140)
GLUCOSE SERPL-MCNC: 188 MG/DL (ref 65–140)
GLUCOSE SERPL-MCNC: 228 MG/DL (ref 65–140)
GLUCOSE SERPL-MCNC: 288 MG/DL (ref 65–140)
HCT VFR BLD AUTO: 37.1 % (ref 36.5–49.3)
HGB BLD-MCNC: 12.1 G/DL (ref 12–17)
IMM GRANULOCYTES # BLD AUTO: 0.06 THOUSAND/UL (ref 0–0.2)
IMM GRANULOCYTES NFR BLD AUTO: 1 % (ref 0–2)
LYMPHOCYTES # BLD AUTO: 2.43 THOUSANDS/ÂΜL (ref 0.6–4.47)
LYMPHOCYTES NFR BLD AUTO: 23 % (ref 14–44)
MCH RBC QN AUTO: 28.6 PG (ref 26.8–34.3)
MCHC RBC AUTO-ENTMCNC: 32.6 G/DL (ref 31.4–37.4)
MCV RBC AUTO: 88 FL (ref 82–98)
MONOCYTES # BLD AUTO: 1.13 THOUSAND/ÂΜL (ref 0.17–1.22)
MONOCYTES NFR BLD AUTO: 11 % (ref 4–12)
NEUTROPHILS # BLD AUTO: 6.27 THOUSANDS/ÂΜL (ref 1.85–7.62)
NEUTS SEG NFR BLD AUTO: 60 % (ref 43–75)
NRBC BLD AUTO-RTO: 0 /100 WBCS
PLATELET # BLD AUTO: 255 THOUSANDS/UL (ref 149–390)
PMV BLD AUTO: 8.5 FL (ref 8.9–12.7)
POTASSIUM SERPL-SCNC: 3.7 MMOL/L (ref 3.5–5.3)
RBC # BLD AUTO: 4.23 MILLION/UL (ref 3.88–5.62)
SODIUM SERPL-SCNC: 133 MMOL/L (ref 135–147)
WBC # BLD AUTO: 10.39 THOUSAND/UL (ref 4.31–10.16)

## 2023-06-05 PROCEDURE — 97167 OT EVAL HIGH COMPLEX 60 MIN: CPT

## 2023-06-05 PROCEDURE — 85025 COMPLETE CBC W/AUTO DIFF WBC: CPT | Performed by: PHYSICIAN ASSISTANT

## 2023-06-05 PROCEDURE — 82948 REAGENT STRIP/BLOOD GLUCOSE: CPT

## 2023-06-05 PROCEDURE — 99222 1ST HOSP IP/OBS MODERATE 55: CPT | Performed by: UROLOGY

## 2023-06-05 PROCEDURE — 99233 SBSQ HOSP IP/OBS HIGH 50: CPT | Performed by: INTERNAL MEDICINE

## 2023-06-05 PROCEDURE — 97163 PT EVAL HIGH COMPLEX 45 MIN: CPT

## 2023-06-05 PROCEDURE — 80048 BASIC METABOLIC PNL TOTAL CA: CPT | Performed by: PHYSICIAN ASSISTANT

## 2023-06-05 RX ORDER — FUROSEMIDE 40 MG/1
40 TABLET ORAL DAILY
Status: DISCONTINUED | OUTPATIENT
Start: 2023-06-06 | End: 2023-06-06 | Stop reason: HOSPADM

## 2023-06-05 RX ADMIN — INSULIN LISPRO 3 UNITS: 100 INJECTION, SOLUTION INTRAVENOUS; SUBCUTANEOUS at 17:10

## 2023-06-05 RX ADMIN — INSULIN LISPRO 2 UNITS: 100 INJECTION, SOLUTION INTRAVENOUS; SUBCUTANEOUS at 22:15

## 2023-06-05 RX ADMIN — CEFTRIAXONE 1000 MG: 10 INJECTION, POWDER, FOR SOLUTION INTRAVENOUS at 05:39

## 2023-06-05 RX ADMIN — GABAPENTIN 600 MG: 300 CAPSULE ORAL at 22:15

## 2023-06-05 RX ADMIN — INSULIN LISPRO 4 UNITS: 100 INJECTION, SOLUTION INTRAVENOUS; SUBCUTANEOUS at 10:56

## 2023-06-05 RX ADMIN — ONDANSETRON 4 MG: 2 INJECTION INTRAMUSCULAR; INTRAVENOUS at 20:20

## 2023-06-05 RX ADMIN — PROPRANOLOL HYDROCHLORIDE 60 MG: 60 CAPSULE, EXTENDED RELEASE ORAL at 08:10

## 2023-06-05 RX ADMIN — INSULIN LISPRO 1 UNITS: 100 INJECTION, SOLUTION INTRAVENOUS; SUBCUTANEOUS at 07:17

## 2023-06-05 RX ADMIN — INSULIN LISPRO 3 UNITS: 100 INJECTION, SOLUTION INTRAVENOUS; SUBCUTANEOUS at 07:20

## 2023-06-05 RX ADMIN — ATORVASTATIN CALCIUM 80 MG: 80 TABLET, FILM COATED ORAL at 17:10

## 2023-06-05 RX ADMIN — INSULIN LISPRO 3 UNITS: 100 INJECTION, SOLUTION INTRAVENOUS; SUBCUTANEOUS at 11:00

## 2023-06-05 RX ADMIN — POLYETHYLENE GLYCOL 3350 17 G: 17 POWDER, FOR SOLUTION ORAL at 15:10

## 2023-06-05 RX ADMIN — SERTRALINE HYDROCHLORIDE 25 MG: 25 TABLET ORAL at 22:15

## 2023-06-05 RX ADMIN — INSULIN LISPRO 1 UNITS: 100 INJECTION, SOLUTION INTRAVENOUS; SUBCUTANEOUS at 17:10

## 2023-06-05 RX ADMIN — CLOPIDOGREL BISULFATE 75 MG: 75 TABLET ORAL at 08:11

## 2023-06-05 RX ADMIN — PANTOPRAZOLE SODIUM 40 MG: 40 TABLET, DELAYED RELEASE ORAL at 05:39

## 2023-06-05 RX ADMIN — AMLODIPINE BESYLATE 10 MG: 10 TABLET ORAL at 08:11

## 2023-06-05 RX ADMIN — INSULIN GLARGINE 8 UNITS: 100 INJECTION, SOLUTION SUBCUTANEOUS at 08:11

## 2023-06-05 RX ADMIN — SENNOSIDES AND DOCUSATE SODIUM 2 TABLET: 8.6; 5 TABLET ORAL at 22:16

## 2023-06-05 RX ADMIN — ENOXAPARIN SODIUM 40 MG: 100 INJECTION SUBCUTANEOUS at 08:11

## 2023-06-05 RX ADMIN — ACETAMINOPHEN 325MG 650 MG: 325 TABLET ORAL at 18:50

## 2023-06-05 RX ADMIN — GABAPENTIN 300 MG: 300 CAPSULE ORAL at 17:10

## 2023-06-05 RX ADMIN — LATANOPROST 1 DROP: 50 SOLUTION OPHTHALMIC at 22:15

## 2023-06-05 RX ADMIN — GABAPENTIN 300 MG: 300 CAPSULE ORAL at 08:10

## 2023-06-05 NOTE — PHYSICAL THERAPY NOTE
PHYSICAL THERAPY EVALUATION          Patient Name: Nayeli SILVA Date: 6/5/2023  PT EVALUATION    67 y o     [id number]    Palpitations [R00 2]  UTI (urinary tract infection) [N39 0]  Pyelonephritis [N12]  Sepsis ([facility] [address] ) [A41 9]    Past Medical History:   Diagnosis Date    Acute laryngitis     Acute nonsuppurative otitis media, unspecified laterality     Arm weakness     Arthritis     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HCC)     Bladder infection     Bronchitis     Constipation     Cough     Diabetes mellitus (HCC)     Dizziness     Dysfunction of eustachian tube     Erectile dysfunction of non-organic origin     Fatigue     Glaucoma     Hiatal hernia     Hypertension     Imbalance     Leg muscle spasm     MS (multiple sclerosis) ([facility] [address] )     Nephrolithiasis     Neurogenic bladder     No natural teeth     Sinus pain     Spinal stenosis     Strain of thoracic region     Stroke [facility])     [facility]      Past Surgical History:   Procedure Laterality Date    APPENDECTOMY      BRAIN SURGERY      Coil placed in aneurysm    CYSTOSCOPY      CYSTOSCOPY      CYSTOSCOPY  06/11/2018    CYSTOSCOPY  01/15/2021    EYE SURGERY      transscleral cyclophotocoagulation noncontact YAG laser    MYRINGOTOMY      with ventilation tube insertion    SD LITHOLAPAXY SMPL/SM <2 5 CM N/A 5/7/2019    Procedure: CYSTOSCOPY, holmium laser litholapaxy of bladder stones, EXCHANGE OF SP TUBE;  Surgeon: Jorge Roe MD;  [facility]: [facility];  Service: Urology    SUPRAPUBIC CATHETER INSERTION          06/05/23 1004   PT Last Visit   PT Visit Date 06/05/23   Note Type   Note type Evaluation   Pain Assessment   Pain Assessment Tool 0-10   Pain Score No Pain   Restrictions/Precautions   Other Precautions Contact/isolation;Cognitive; Chair Alarm; Bed Alarm;Multiple lines; Fall Risk   Home Living   Type [address] One level;Ramped entrance;Stairs to enter without rails   Bathroom Shower/Tub Walk-in shower   Bathroom Toilet Raised   Bathroom Equipment Grab bars in shower; Shower chair;Commode;Grab bars around toilet   Home Equipment Walker;Cane;Wheelchair-manual;Hospital bed; Other (Comment)  (sit to stand device)   Additional Comments ramp + 1 DAI   Prior Function   Level of [place] Needs assistance with ADLs; Needs assistance with functional mobility; Needs assistance with IADLS   Lives With Family  (brother and sister)   Esmer Clark Help From Family; Other (Comment)  (senior life x1/wk)   IADLs Family/Friend/Other provides transportation; Family/Friend/Other provides meals; Family/Friend/Other provides medication management   Falls in the last 6 months 1 to 4   Comments indep toilets otherwise gets A for ADLs, IADLs  brother and sister are home and provide all care  they bump him up the 1 DAI in wc  he needs A OOB and then they use sit<>stand machine to get him OOB  he can self propel in the wc   General   Additional Pertinent History pt admitted 6/3/23 for sepsis  up and oob orders  PMHx significant for MS w BLE weakness and hypertonia as well as neurogenic bladder, arthritis, DM, glaucoma   Cognition   Overall Cognitive Status Impaired   Arousal/Participation Cooperative   Attention Attends with cues to redirect   Orientation Level Oriented to person;Oriented to place;Oriented to time;Disoriented to situation  (general to time)   Memory Decreased recall of recent events;Decreased short term memory   Following Commands Follows one step commands with increased time or repetition   Comments poor historian  may benefit from formal cognitive testing as needed  would confirm caregiver abilities prior to dc   RLE Assessment   RLE Assessment X  (can wiggle toes  attempts quad set  increased tone throughout LE)   LLE Assessment   LLE Assessment X  (can DF ankle 2-, perform quad set  increased tone throughout LE)   Coordination   Movements are Fluid and Coordinated 0   Coordination and Movement Description noted clonus in bl ankle L>R  increased tone, jerking movements w activity  Sensation X  (BLE L2-S1)   Bed Mobility   Supine to Sit 2  Maximal assistance   Additional items Assist x 2;HOB elevated; Bedrails; Increased time required;Verbal cues; Other;LE management   Sit to Supine 2  Maximal assistance   Additional items Assist x 2; Increased time required;Verbal cues;LE management; Other;Bedrails   Transfers   Sit to Stand 2  Maximal assistance   Additional items Assist x 2; Increased time required;Verbal cues; Other  (RW, bed height elevated)   Stand to Sit 2  Maximal assistance   Additional items Assist x 2; Increased time required;Verbal cues; Other;Bed elevated  (RW)   Ambulation/Elevation   Gait pattern Not appropriate  (given BLE tone, retropulsion)   Balance   Static Sitting Fair -  (increased time to achieve unsupported sitting balance, initially retropulsive)   Dynamic Sitting Poor +   Static Standing Zero  (Ax2)   Activity Tolerance   Activity Tolerance Treatment limited secondary to medical complications (Comment)   Medical Staff Made [facility]  cleared for therapy   Assessment   Prognosis Fair   Problem List Decreased strength; Impaired balance;Decreased mobility; Impaired judgement;Decreased safety awareness; Impaired tone; Impaired sensation; Impaired vision   Assessment Nia Juarez is a 67 y o  male admitted to [facility] on 6/3/2023 for Sepsis ([address] )  PT was consulted and pt was seen on 6/5/2023 for mobility assessment and d/c planning  Pt presents w contact isolation, multiple lines, high fall risk  At baseline gets A for ADLs, IADLs and mobility  Gets bumped up DAI in wc, gets A for bed mobility and sit<>stand transfers via mechanical lift and can self propel in wc  Pt is currently functioning at a maximum assistance x2 level for bed mobility and sit<>stand transfers  Pt demonstrated deficits of cognition, strength, tone, coordination, balance   Unable to initiate bed mobility w BLE and limited support of UEs/ trunk despite cues for use of bedrails to facilitate transf  Upon sitting EOB w significant retropulsion and BLE tone impacting unsupported sitting balance  In time he was able to pull himself forward using bl bedrails and noted decreased tone in BLE allowing LE support on ground  However during standing trial w increase in LE tone again and retropulsion making standing balance zero (Ax2) and preventing ambulation or ability to reposition LEs for improved balance  Pt will benefit from continued skilled IP PT to address the above mentioned impairments  in order to maximize recovery and increase functional independence when completing mobility and ADLs  Currently PT recommendations for DME include quick move oob  At this time PT recommendations for d/c are ? HHPT and continued family support  If family unable to care for patient at current LOF would consider STR w transition to LTC  Barriers to Discharge None   Goals   Patient Goals be stronger, able to sit EOB better   STG Expiration Date 06/15/23   Short Term Goal #1 1)  Pt will perform bed mobility with mod Ax1 demonstrating appropriate technique 100% of the time in order to improve function  2)  Perform all transfers with mod Ax1 demonstrating safe and appropriate technique 100% of the time in order to improve ability to negotiate safely in home environment  3) wc mobility >50' at S level to negotiate household distances  4)  Improve overall strength and balance 1/2 grade in order to optimize ability to perform functional tasks and reduce fall risk  5) Increase activity tolerance to 45 minutes in order to improve endurance to functional tasks  6) PT for ongoing patient and family/caregiver education, DME needs and d/c planning in order to promote highest level of function in least restrictive environment  Plan   Treatment/Interventions Functional transfer training;LE strengthening/ROM; Therapeutic exercise;Cognitive reorientation;Patient/family training;Equipment eval/education; Bed mobility; Compensatory technique education;Spoke to nursing;OT   PT Frequency 2-3x/wk   Recommendation   PT Discharge Recommendation (S)    (would confirm PLOF w family; may benefit from [address] as appropriate vs STR pending caregiver abilities)   Equipment Recommended   (quick move OOB)   AM-PAC Basic Mobility Inpatient   Turning in Flat Bed Without Bedrails 1   Lying on Back to Sitting on Edge of Flat Bed Without Bedrails 1   Moving Bed to Chair 1   Standing Up From Chair Using Arms 1   Walk in Room 1   Climb 3-5 Stairs With Railing 1   Basic Mobility Inpatient Raw Score 6   Turning Head Towards Sound 3   Follow Simple Instructions 3   Low Function Basic Mobility Raw Score  12   Low Function Basic Mobility Standardized Score  18 33   Highest Level Of Mobility   JH-HLM Goal 2: Bed activities/Dependent transfer   JH-HLM Achieved 3: Sit at edge of bed   End of Consult   Patient Position at End of Consult Supine;Bed/Chair alarm activated; All needs within reach   History: co - morbidities, social background, fall risk, use of assistive device, assist for adl's/iadl's, cognition, multiple lines  Exam: impairments in systems including musculoskeletal (strength), neuromuscular (balance,transfers, motor function and sensation, tone), am-pac, cognition  Clinical: unstable/unpredictable  Complexity:high      Arya Zabala, PT

## 2023-06-05 NOTE — ASSESSMENT & PLAN NOTE
Wt Readings from Last 3 Encounters:   06/05/23 75 2 kg (165 lb 12 6 oz)   04/14/23 81 6 kg (179 lb 14 3 oz)   02/27/23 81 6 kg (180 lb)       · Resume home diuretic dose

## 2023-06-05 NOTE — UTILIZATION REVIEW
Initial Clinical Review    Admission: Date/Time/Statement:   Admission Orders (From admission, onward)     Ordered        06/03/23 0657  INPATIENT ADMISSION  Once                      Orders Placed This Encounter   Procedures   • INPATIENT ADMISSION     Standing Status:   Standing     Number of Occurrences:   1     Order Specific Question:   Level of Care     Answer:   Med Surg [16]     Order Specific Question:   Estimated length of stay     Answer:   More than 2 Midnights     Order Specific Question:   Certification     Answer:   I certify that inpatient services are medically necessary for this patient for a duration of greater than two midnights  See H&P and MD Progress Notes for additional information about the patient's course of treatment  ED Arrival Information     Expected   -    Arrival   6/3/2023 01:35    Acuity   Emergent            Means of arrival   Ambulance    Escorted by   [facility] ([address])    Service   Hospitalist    Admission type   Emergency            Arrival complaint   palipitations           Chief Complaint   Patient presents with   • Palpitations     Pt reports palpitations and sob beginning 2 hours ago  Pt states it's difficult to catch his breath and feels pounding/pressure in his chest  Pt also c/o headache and numbness to face  Pt is a poor historian  Initial Presentation: 67 y o  male presents to ED via  EMS  From home with increasing confusion  PMH  Is  MS, neurogenic bladder with cheonic S/P catheter, HTN, depression,  CHF, NIDDM 2 and  Wheelchair bound  Has  H/o UTI's  S/P  Catheter exchanged monthly  Met sepsis criteria in ED with   Concern for pyelo  Alert and oriented   To person, place and month, needs re orientation to year  Ct abdomen suspicious for pyelo  Admit  Ip with Sepsis, Pyelonephritis and plan is   SHARON, monitor labs, blood/urine cultures, bowel regimen  And continue home meds          Date:   6/4         Day 2:   Continue SHARON> Monitor labs  Follow  Cultures  Cultures negative thus far  S/P  Catheter to drainage shows clear yellow urine  Continue current meds/treatment plan  6/5    Urology consult   Improving on  SHARON  No intervention indicated  Continue current meds/treatment plan  Needs PT/OT      ED Triage Vitals   Temperature Pulse Respirations Blood Pressure SpO2   06/03/23 0140 06/03/23 0140 06/03/23 0140 06/03/23 0140 06/03/23 0140   97 7 °F (36 5 °C) (!) 106 16 130/63 94 %      Temp Source Heart Rate Source Patient Position - Orthostatic VS BP Location FiO2 (%)   06/03/23 0140 06/03/23 0140 06/03/23 0140 06/03/23 0140 --   Oral Monitor Sitting Right arm       Pain Score       06/03/23 0751       No Pain          Wt Readings from Last 1 Encounters:   06/05/23 75 2 kg (165 lb 12 6 oz)     Additional Vital Signs:   04/23 22:32:52 98 °F (36 7 °C) 71 18 128/59 82 93 % -- --   06/04/23 20:41:03 99 1 °F (37 3 °C) 76 -- -- -- 92 % None (Room air) --   06/04/23 17:35:47 100 2 °F (37 9 °C) 83 -- -- -- 94 % -- --   06/04/23 15:44:15 100 3 °F (37 9 °C) 80 18 130/53 79 95 % None (Room air) Lying   06/04/23 07:25:01 98 7 °F (37 1 °C) 86 18 137/59 85 94 % None (Room air) Lying   06/04/23 0500 98 2 °F (36 8 °C) -- -- -- -- -- -- --   06/04/23 01:39:40 100 7 °F (38 2 °C) Abnormal  96 -- 142/65 91 91 % -- --   06/04/23 00:09:52 100 3 °F (37 9 °C) 95 16 159/116 Abnormal  130 94 % -- --   06/03/23 2015 -- -- -- -- -- -- None (Room air) --   06/03/23 16:22:49 98 7 °F (37 1 °C) 88 -- -- -- 91 % -- --   06/03/23 16:19:21 100 °F (37 8 °C) 90 -- -- -- 94 % -- --   06/03/23 15:08:15 99 2 °F (37 3 °C) 87 -- -- -- 95 % -- --   06/03/23 14:58:36 101 °F (38 3 °C) Abnormal  92 18 106/59 75 95 % None (Room air) Lying   06/03/23 12:22:41 -- 99 -- 158/80 106 95 % -- --   06/03/23 07:52:34 98 1 °F (36 7 °C) 100 18 158/76 103 96 % None (Room air) Lying   06/03/23 0724 -- 92 16 126/73 -- 96 % None (Room air) Lying   06/03/23 0627 -- 101 16 135/74 -- 95 % None (Room air) Lying   06/03/23 0452 -- 104 16 130/62 -- 96 % None (Room air) Lying   06/03/23 0302 -- 107 Abnormal  16 131/71 -- 95 % None (Room air) Sitting   06/03/23 0140 97 7 °F (36 5 °C) 106 Abnormal  16 130/63 -- 94 % None (Room air) Sitting       Pertinent Labs/Diagnostic Test Results:   CT chest abdomen pelvis w contrast   Final Result by Ruslan Marrero MD (06/03 3217)      Findings suggestive of urinary tract infection including subtle areas of wedge-shaped nephrographic hypoenhancement in the left kidney suspicious for a mild changes of left-sided pyelonephritis    Also, the urinary bladder is not collapsed despite the    presence of an intraluminal Cage catheter balloon and there is mild uniform bladder wall thickening  No other findings to account for sepsis  Constipation with stercoral proctitis  Workstation performed: [id number]         XR chest 2 views   Final Result by Jaquan Corrales MD (06/03 1240)      No acute cardiopulmonary disease                    Workstation performed: [id number]               Results from last 7 days   Lab Units 06/05/23  0458 06/04/23  0513 06/03/23  0214   HEMATOCRIT % 37 1 36 7 41 6   HEMOGLOBIN g/dL 12 1 11 8* 13 8   NEUTROS ABS Thousands/µL 6 27 8 88* 10 93*   PLATELETS Thousands/uL 255 263 317   WBC Thousand/uL 10 39* 13 18* 15 61*         Results from last 7 days   Lab Units 06/05/23  0458 06/04/23  0513 06/03/23  0214   ANION GAP mmol/L 6 7 10   BUN mg/dL 7 8 10   CALCIUM mg/dL 8 8 8 5 9 1   CHLORIDE mmol/L 104 102 96   CO2 mmol/L 23 23 23   CREATININE mg/dL 0 83 0 89 0 76   EGFR ml/min/1 73sq m 87 84 90   POTASSIUM mmol/L 3 7 3 6 3 8   SODIUM mmol/L 133* 132* 129*     Results from last 7 days   Lab Units 06/04/23  0513 06/03/23  0214   ALBUMIN g/dL 3 0* 3 6   ALK PHOS U/L 86 93   ALT U/L 15 19   AST U/L 13 13   TOTAL BILIRUBIN mg/dL 0 75 0 46   TOTAL PROTEIN g/dL 5 9* 7 0     Results from last 7 days   Lab Units 06/05/23  105 06/05/23  0654 06/04/23  2059 06/04/23  1557 06/04/23  1056 06/04/23  0724 06/03/23  2145 06/03/23  1620 06/03/23  1113 06/03/23  0216   POC GLUCOSE mg/dl 288* 155* 228* 146* 324* 152* 167* 179* 218* 280*     Results from last 7 days   Lab Units 06/05/23  0458 06/04/23  0513 06/03/23  0214   GLUCOSE RANDOM mg/dL 162* 164* 291*         Results from last 7 days   Lab Units 06/03/23  0214   EAG mg/dl 217   HEMOGLOBIN A1C % 9 2*           Results from last 7 days   Lab Units 06/03/23  0445 06/03/23  0214   HS TNI 0HR ng/L  --  6   HS TNI 2HR ng/L 6  --    HSTNI D2 ng/L 0  --      Results from last 7 days   Lab Units 06/03/23  0214   D-DIMER QUANTITATIVE ug/ml FEU 0 46     Results from last 7 days   Lab Units 06/03/23  0214   INR  0 89   PROTIME seconds 12 0   PTT seconds 25         Results from last 7 days   Lab Units 06/03/23  0214   PROCALCITONIN ng/ml 0 09     Results from last 7 days   Lab Units 06/03/23  0536 06/03/23  0251   LACTIC ACID mmol/L 1 6 2 2*                   Results from last 7 days   Lab Units 06/03/23  0255   BACTERIA UA /hpf Moderate*   BILIRUBIN UA  Negative   BLOOD UA  Negative   CLARITY UA  Turbid   COLOR UA  Colorless   EPITHELIAL CELLS WET PREP /hpf None Seen   GLUCOSE UA mg/dl >=1000 (1%)*   KETONES UA mg/dl Negative   LEUKOCYTES UA  Large*   NITRITE UA  Positive*   PH UA  6 0   PROTEIN UA mg/dl Negative   RBC UA /hpf 2-4*   SPEC GRAV UA  1 017   UROBILINOGEN UA (BE) mg/dl <2 0   WBC UA /hpf Innumerable*                                 Results from last 7 days   Lab Units 06/03/23  0255 06/03/23  0252 06/03/23  0251   BLOOD CULTURE   --  No Growth at 48 hrs  No Growth at 48 hrs     URINE CULTURE  >100,000 cfu/ml Escherichia coli*  --   --                    ED Treatment:   Medication Administration from 06/03/2023 0135 to 06/03/2023 8924       Date/Time Order Dose Route Action Comments     06/03/2023 0531 EDT ceftriaxone (ROCEPHIN) 2 g/50 mL in dextrose IVPB 0 mg Intravenous Stopped -- 06/03/2023 0448 EDT ceftriaxone (ROCEPHIN) 2 g/50 mL in dextrose IVPB 2,000 mg Intravenous New Bag --     06/03/2023 0627 EDT sodium chloride 0 9 % bolus 1,000 mL 0 mL Intravenous Stopped --     06/03/2023 0450 EDT sodium chloride 0 9 % bolus 1,000 mL 1,000 mL Intravenous New Bag --     06/03/2023 0517 EDT iohexol (OMNIPAQUE) 350 MG/ML injection (SINGLE-DOSE) 100 mL 100 mL Intravenous Given --        Present on Admission:  • Chronic diastolic congestive heart failure (HCC)  • Dyslipidemia  • Hypertension  • Neurogenic bladder  • Multiple sclerosis (HCC)  • (Resolved) Hyponatremia  • Depression  • Diabetes mellitus ([facility] )  • 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[facility])  • Acute metabolic encephalopathy      Admitting Diagnosis: Palpitations [R00 2]  UTI (urinary tract infection) [N39 0]  Pyelonephritis [N12]  Sepsis ([facility] ) [A41 9]  Age/Sex: 67 y o  male  Admission Orders:  Scheduled Medications:  amLODIPine, 10 mg, Oral, Daily  atorvastatin, 80 mg, Oral, Daily With Dinner  cefTRIAXone, 1,000 mg, Intravenous, Q24H  clopidogrel, 75 mg, Oral, Daily  enoxaparin, 40 mg, Subcutaneous, Daily  gabapentin, 300 mg, Oral, BID  gabapentin, 600 mg, Oral, HS  insulin glargine, 8 Units, Subcutaneous, QAM  insulin lispro, 1-6 Units, Subcutaneous, TID AC  insulin lispro, 1-6 Units, Subcutaneous, HS  insulin lispro, 3 Units, Subcutaneous, TID With Meals  latanoprost, 1 drop, Both Eyes, HS  pantoprazole, 40 mg, Oral, Early Morning  propranolol, 60 mg, Oral, Daily  senna-docusate sodium, 2 tablet, Oral, HS  sertraline, 25 mg, Oral, HS      Continuous IV Infusions:     PRN Meds:  acetaminophen, 650 mg, Oral, Q6H PRN  albuterol, 2 5 mg, Nebulization, Q6H PRN  ALPRAZolam, 0 25 mg, Oral, BID PRN  calcium carbonate, 1,000 mg, Oral, Daily PRN  melatonin, 3 mg, Oral, HS PRN  ondansetron, 4 mg, Intravenous, Q6H PRN  polyethylene glycol, 17 g, Oral, Daily PRN        IP CONSULT TO UROLOGY    Network Utilization Review Department  ATTENTION: Please call with any questions or concerns to [phone] and carefully listen to the prompts so that you are directed to the right person  All voicemails are confidential   Kim Sandoval all requests for admission clinical reviews, approved or denied determinations and any other requests to dedicated fax number below belonging to the campus where the patient is receiving treatment   List of dedicated fax numbers for the Facilities:  [address] DENIALS (Administrative/Medical Necessity) [phone]   [address] (Maternity/NICU/Pediatrics) [phone]   8 Sarah Juárez [phone]   [place] [address] [phone]   [address] [address] [facility] [phone]   [facility] 134 815 [facility] [phone]

## 2023-06-05 NOTE — PLAN OF CARE
Problem: PHYSICAL THERAPY ADULT  Goal: Performs mobility at highest level of function for planned discharge setting  See evaluation for individualized goals  Description: Treatment/Interventions: Functional transfer training, LE strengthening/ROM, Therapeutic exercise, Cognitive reorientation, Patient/family training, Equipment eval/education, Bed mobility, Compensatory technique education, Spoke to nursing, OT  Equipment Recommended:  (quick move OOB)       See flowsheet documentation for full assessment, interventions and recommendations  Note: Prognosis: Fair  Problem List: Decreased strength, Impaired balance, Decreased mobility, Impaired judgement, Decreased safety awareness, Impaired tone, Impaired sensation, Impaired vision  Assessment: Eber Doe is a 67 y o  male admitted to [facility] on 6/3/2023 for Sepsis ([facility] )  PT was consulted and pt was seen on 6/5/2023 for mobility assessment and d/c planning  Pt presents w contact isolation, multiple lines, high fall risk  At baseline gets A for ADLs, IADLs and mobility  Gets bumped up DAI in wc, gets A for bed mobility and sit<>stand transfers via mechanical lift and can self propel in wc  Pt is currently functioning at a maximum assistance x2 level for bed mobility and sit<>stand transfers  Pt demonstrated deficits of cognition, strength, tone, coordination, balance  Unable to initiate bed mobility w BLE and limited support of UEs/ trunk despite cues for use of bedrails to facilitate transf  Upon sitting EOB w significant retropulsion and BLE tone impacting unsupported sitting balance  In time he was able to pull himself forward using bl bedrails and noted decreased tone in BLE allowing LE support on ground  However during standing trial w increase in LE tone again and retropulsion making standing balance zero (Ax2) and preventing ambulation or ability to reposition LEs for improved balance   Pt will benefit from continued skilled IP PT to address the above mentioned impairments  in order to maximize recovery and increase functional independence when completing mobility and ADLs  Currently PT recommendations for DME include quick move oob  At this time PT recommendations for d/c are ? HHPT and continued family support  If family unable to care for patient at current LOF would consider STR w transition to LTC  Barriers to Discharge: None     PT Discharge Recommendation: (S)  (would confirm PLOF w family; may benefit from [address] as appropriate vs STR pending caregiver abilities)    See flowsheet documentation for full assessment

## 2023-06-05 NOTE — OCCUPATIONAL THERAPY NOTE
Occupational Therapy Evaluation     Patient Name: Adelaida Obrien  WLNUN'H Date: 6/5/2023  Problem List  Principal Problem:    Sepsis [facility])  Active Problems:    Depression    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HCC)    Chronic suprapubic catheter ([facility] )    Dyslipidemia    Hypertension    Multiple sclerosis ([facility] )    Neurogenic bladder    Diabetes mellitus ([facility] [address] )    History of CVA (cerebrovascular accident)    Acute metabolic encephalopathy    Chronic diastolic congestive heart failure ([facility] [address] )    Pyelonephritis    Constipation    Cyst of left kidney    Past Medical History  Past Medical History:   Diagnosis Date    Acute laryngitis     Acute nonsuppurative otitis media, unspecified laterality     Arm weakness     Arthritis     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HCC)     Bladder infection     Bronchitis     Constipation     Cough     Diabetes mellitus ([facility])     Dizziness     Dysfunction of eustachian tube     Erectile dysfunction of non-organic origin     Fatigue     Glaucoma     Hiatal hernia     Hypertension     Imbalance     Leg muscle spasm     MS (multiple sclerosis) (HCC)     Nephrolithiasis     Neurogenic bladder     No natural teeth     Sinus pain     Spinal stenosis     Strain of thoracic region     Stroke [facility])     Suprapubic catheter ([facility] )      Past Surgical History  Past Surgical History:   Procedure Laterality Date    APPENDECTOMY      BRAIN SURGERY      Coil placed in aneurysm    CYSTOSCOPY      CYSTOSCOPY      CYSTOSCOPY  06/11/2018    CYSTOSCOPY  01/15/2021    EYE SURGERY      transscleral cyclophotocoagulation noncontact YAG laser    MYRINGOTOMY      with ventilation tube insertion    RI LITHOLAPAXY SMPL/SM <2 5 CM N/A 5/7/2019    Procedure: CYSTOSCOPY, holmium laser litholapaxy of bladder stones, EXCHANGE OF SP TUBE;  Surgeon: Johanna Renee MD;  Location: [facility];  Service: Urology    SUPRAPUBIC CATHETER INSERTION             06/05/23 1003   OT Last Visit   OT Visit Date 06/05/23   Note Type   Note type "Evaluation   Pain Assessment   Pain Assessment Tool 0-10   Pain Score No Pain   Restrictions/Precautions   Weight Bearing Precautions Per Order No   Other Precautions Contact/isolation;Cognitive; Chair Alarm; Bed Alarm; Fall Risk;Multiple lines   Home Living   Type of [address] [place], [place] [address]e One level;Ramped entrance;Stairs to enter without rails  (ramp + 1 DAI)   Bathroom Shower/Tub Walk-in shower   Bathroom Toilet Raised   Bathroom Equipment Grab bars in shower; Shower chair;Commode;Grab bars around toilet   [address] Walker;Cane;Wheelchair-manual;Hospital bed   Additional Comments Pt lives with his brother and sister in a one level house with ramp + 1 DAI  [facility] for \"a few hours  \" (-) home alone  Prior Function   Level of [place] Needs assistance with ADLs; Needs assistance with functional mobility; Needs assistance with IADLS   Lives With Family  (brother and sister)   [place]unheatherjo[place] Re Help From Family; Other (Comment)  ([facility])   IADLs Family/Friend/Other provides transportation; Family/Friend/Other provides meals; Family/Friend/Other provides medication management   Falls in the last 6 months 1 to 4  (2-3)   Vocational On disability   Comments At baseline, pt reports requiring assist for ADLs, IADLs, and functional transfers w/ use of Quick Move/sit to stand  Pt initially reports completing transfers w/ Supervision, however later reports requiring Ax1-2  Unknown level of assistance at baseline  Mod I for W/C mobility  (-)   +Falls PTA  Lifestyle   Autonomy At baseline, pt reports requiring assist for ADLs, IADLs, and functional transfers w/ use of Quick Move/sit to stand  Pt initially reports completing transfers w/ Supervision, however later reports requiring Ax1-2  Unknown level of assistance at baseline  Mod I for W/C mobility  (-)   +Falls PTA     Reciprocal Relationships Brother, sister   Service to Others On disability   Intrinsic " Gratification Watching TV   ADL   Where Assessed Edge of bed   Eating Assistance 7  Independent   Grooming Assistance 5  Supervision/Setup   UB Bathing Assistance 4  Minimal Assistance   LB Bathing Assistance 3  Moderate Assistance   UB Dressing Assistance 4  Minimal Assistance   LB Dressing Assistance 2  Maximal [address]  2  Maximal Assistance   Functional Assistance 3  Moderate Assistance   Bed Mobility   Supine to Sit 2  Maximal assistance   Additional items Assist x 2;HOB elevated; Bedrails; Increased time required;Verbal cues;LE management   Sit to Supine 2  Maximal assistance   Additional items Assist x 2; Increased time required;Verbal cues;LE management   Additional Comments Retropulsive EOB, +LE spasms  Pt lying supine with bed alarm activated at end of session  Call bell and phone within reach  All needs met and pt reports no further questions for OT at this time  Transfers   Sit to Stand 2  Maximal assistance   Additional items Assist x 2; Increased time required;Verbal cues  (bed height elevated)   Stand to Sit 2  Maximal assistance   Additional items Assist x 2; Increased time required;Verbal cues   Additional Comments Cues for safe technique, hand placement, and posture   Functional Mobility   Additional Comments N/A, pt unable to complete at this time   Balance   Static Sitting   (Initially Poor+, progressing to Fair-)   Dynamic Sitting Poor +   Static Standing Poor -   Dynamic Standing   (Zero)   Activity Tolerance   Activity Tolerance Patient limited by fatigue; Other (Comment); Treatment limited secondary to medical complications (Comment)  (weakness, LE spasms)   Medical Staff Made Aware Ebony Leyva PT   Nurse Made Aware yes; Dariusz Acosta RN   RUE Assessment   RUE Assessment WFL  (4-/5 throughout)   LUE Assessment   LUE Assessment WFL  (4-/5 throughout)   Hand Function   Gross Motor Coordination Functional   Fine Motor Coordination Functional   Sensation   Light Touch Partial deficits "in the RLE;Partial deficits in the LLE   Proprioception   Proprioception No apparent deficits   Vision - Complex Assessment   Additional Comments Reports overall decreased acuity in B/L eyes, able to accurately identify 3/3 digits held up by therapist   Psychosocial   Psychosocial (WDL) WDL   Perception   Inattention/Neglect Appears intact   Cognition   Overall Cognitive Status Impaired   Arousal/Participation Alert; Cooperative   Attention Attends with cues to redirect   Orientation Level Oriented to person;Oriented to place;Oriented to time  (general to time)   Memory Decreased recall of recent events;Decreased recall of precautions   Following Commands Follows one step commands with increased time or repetition   Comments Pleasant and cooperative  Limited insight into deficits   Assessment   Limitation Decreased ADL status; Decreased UE strength;Decreased Safe judgement during ADL;Decreased cognition;Decreased endurance;Decreased self-care trans;Decreased high-level ADLs; Decreased sensation   Prognosis Fair   Assessment Pt is a 67 y o  male seen for OT evaluation s/p adm to [facility] on 6/3/2023 w/ Sepsis  Comorbidities affecting pt’s functional performance include a significant PMH of multiple sclerosis, neurogenic bladder with chronic suprapubic Cage catheter , CVA, type II non-insulin-dependent diabetes , HTN, depression, HLD, chronic diastolic CHF  Pt with active OT orders and activity orders for Up and OOB as tolerated  Pt lives with his brother and sister in a one level house with ramp + 1 DAI  [facility] 1x[facility] for \"a few hours  \" (-) home alone  At baseline, pt reports requiring assist for ADLs, IADLs, and functional transfers w/ use of Quick Move/sit to stand  Pt initially reports completing transfers w/ Supervision, however later reports requiring Ax1-2  Unknown level of assistance at baseline  Mod I for W/C mobility  (-)   +Falls PTA   Upon evaluation, pt currently requires Min " A for UB ADLs, Max-Mod A for LB ADLs, Max A for toileting, Max A of 2 for bed mobility, and Max A of 2 for functional mobility/transfers 2* the following deficits impacting occupational performance: decreased strength , decreased balance, decreased activity tolerance, limited functional reach, impaired sensation, impaired problem solving and decreased safety awareness  These impairments, as well at pt’s personal factors of: DAI home environment, difficulty performing ADLs, difficulty performing transfers/mobility, limited insight into deficits, fall risk  and functional decline  limit pt’s ability to safely engage in all baseline areas of occupation  Based on the aforementioned OT evaluation, functional performance deficits, and assessments, pt has been identified as a High complexity evaluation  Pt to continue to benefit from continued acute OT services during hospital stay to address defined deficits and to maximize level of functional independence in the following Occupational Performance areas: grooming, bathing/shower, toilet hygiene, dressing, medication management, clothing management and social participation  From OT standpoint, recommend STR vs Home OT pending pt's baseline level of functioning (Pt initially reports Supervision, later reports requiring Ax1-2 at baseline) and level of assistance in home environment upon D/C  OT will continue to follow pt 3-5x/wk to address the following goals to  w/in 10-14 days:   Goals   Patient Goals To get stronger   LTG Time Frame 10-14   Long Term Goal Please refer to LTGs listed below   Plan   Treatment Interventions ADL retraining;Functional transfer training;UE strengthening/ROM; Endurance training;Patient/family training;Equipment evaluation/education; Compensatory technique education;Continued evaluation; Activityengagement   Goal Expiration Date 23   OT Treatment Day 0   OT Frequency 3-5x/wk   Recommendation   OT Discharge Recommendation Post acute rehabilitation services  (vs Home OT pending pt's baseline level of functioning and available assist in home environment)   Additional Comments  The patient's raw score on the AM-PAC Daily Activity Inpatient Short Form is 16  A raw score of less than 19 suggests the patient may benefit from discharge to post-acute rehabilitation services  Please refer to the recommendation of the Occupational Therapist for safe discharge planning     AM-PAC Daily Activity Inpatient   Lower Body Dressing 2   Bathing 2   Toileting 2   Upper Body Dressing 3   Grooming 3   Eating 4   Daily Activity Raw Score 16   Daily Activity Standardized Score (Calc for Raw Score >=11) 35 96   AM-[facility] Applied Cognition Inpatient   Following a Speech/Presentation 4   Understanding Ordinary Conversation 4   Taking Medications 3   Remembering Where Things Are Placed or Put Away 3   Remembering List of 4-5 Errands 3   Taking Care of Complicated Tasks 2   Applied Cognition Raw Score 19   Applied Cognition Standardized Score 39 77       GOALS    Pt will improve activity tolerance to G for min 30 min txment sessions for increase engagement in functional tasks    Pt will complete bed mobility at a Min A level w/ G balance/safety demonstrated to decrease caregiver assistance required     Pt will complete UB dressing/self care w/ Supervision using adaptive device and DME as needed     Pt will complete LB dressing/self care w/ min A using adaptive device and DME as needed    Pt will complete toileting w/ Min A w/ G hygiene/thoroughness using DME as needed    Pt will improve functional transfers to Min A on/off all surfaces using DME as needed w/ G balance/safety     Pt will tolerate continued functional mobility assessment and appropriate goals will be established by OTR as applicable     Pt will be attentive 100% of the time during ongoing cognitive assessment w/ G participation to assist w/ safe d/c planning/recommendations    Pt will demonstrate G carryover of pt/caregiver education and training as appropriate w/o cues w/ good tolerance to increase safety during functional tasks    Pt will increase BUE strength by 1MM grade via AROM exercises to increase independence in ADLs and transfers    Pt will verbalize 3 potential fall hazards and identify appropriate compensatory techniques to decrease fall risk in home environment     Pt will increase standing tolerance to 3-5 mins with Poor+ dynamic standing balance to increase safety during participation in ADLs       Nisha Prajapati, OTR/L

## 2023-06-05 NOTE — TELEPHONE ENCOUNTER
FT patient seen in hospital consult for uti and spt exchange. Spt exchanged today and will continue q6 weeks with [facility]. Next OV due Jan 2024 with US and cysto per FT notes.

## 2023-06-05 NOTE — ASSESSMENT & PLAN NOTE
Lab Results   Component Value Date    HGBA1C 9 2 (H) 06/03/2023       Recent Labs     06/04/23  2059 06/05/23  0654 06/05/23  1053 06/05/23  1602   POCGLU 228* 155* 288* 188*       Blood Sugar Average: Last 72 hrs:  (P) 294 [id number]   · Updated A1c demonstrates poor diabetes control with increase to 9 2%   · lantus 5 units qAM added yesterday, monitor for titration   · Hypoglycemia protocol   · ADA diet   · SSI, accuchecks   · [facility]

## 2023-06-05 NOTE — ASSESSMENT & PLAN NOTE
· Patient presented meeting sepsis criteria in the setting of left sided pyelonephritis with chronic suprapubic dela cruz catheter from neurogenic bladder with longstanding history of MS  · Continue Ceftriaxone Day #2/5  Urosepsis present on admission with CT abdomen pelvis with findings suspicious for left-sided pyelonephritis

## 2023-06-05 NOTE — PLAN OF CARE
"  Problem: OCCUPATIONAL THERAPY ADULT  Goal: Performs self-care activities at highest level of function for planned discharge setting  See evaluation for individualized goals  Description: Treatment Interventions: ADL retraining, Functional transfer training, UE strengthening/ROM, Endurance training, Patient/family training, Equipment evaluation/education, Compensatory technique education, Continued evaluation, Activityengagement          See flowsheet documentation for full assessment, interventions and recommendations  Note: Limitation: Decreased ADL status, Decreased UE strength, Decreased Safe judgement during ADL, Decreased cognition, Decreased endurance, Decreased self-care trans, Decreased high-level ADLs, Decreased sensation  Prognosis: Fair  Assessment: Pt is a 67 y o  male seen for OT evaluation s/p adm to [address] on 6/3/2023 w/ Sepsis  Comorbidities affecting pt’s functional performance include a significant PMH of multiple sclerosis, neurogenic bladder with chronic suprapubic Cage catheter , CVA, type II non-insulin-dependent diabetes , HTN, depression, HLD, chronic diastolic CHF  Pt with active OT orders and activity orders for Up and OOB as tolerated  Pt lives with his brother and sister in a one level house with ramp + 1 DAI  Pt goes to [facility] 1x/wk for \"a few hours  \" (-) home alone  At baseline, pt reports requiring assist for ADLs, IADLs, and functional transfers w/ use of Quick Move/sit to stand  Pt initially reports completing transfers w/ Supervision, however later reports requiring Ax1-2  Unknown level of assistance at baseline  Mod I for W/C mobility  (-)   +Falls PTA   Upon evaluation, pt currently requires Min A for UB ADLs, Max-Mod A for LB ADLs, Max A for toileting, Max A of 2 for bed mobility, and Max A of 2 for functional mobility/transfers 2* the following deficits impacting occupational performance: decreased strength , decreased balance, decreased activity " tolerance, limited functional reach, impaired sensation, impaired problem solving and decreased safety awareness  These impairments, as well at pt’s personal factors of: DAI home environment, difficulty performing ADLs, difficulty performing transfers/mobility, limited insight into deficits, fall risk  and functional decline  limit pt’s ability to safely engage in all baseline areas of occupation  Based on the aforementioned OT evaluation, functional performance deficits, and assessments, pt has been identified as a High complexity evaluation  Pt to continue to benefit from continued acute OT services during hospital stay to address defined deficits and to maximize level of functional independence in the following Occupational Performance areas: grooming, bathing/shower, toilet hygiene, dressing, medication management, clothing management and social participation  From OT standpoint, recommend STR vs Home OT pending pt's baseline level of functioning (Pt initially reports Supervision, later reports requiring Ax1-2 at baseline) and level of assistance in home environment upon D/C   OT will continue to follow pt 3-5x/wk to address the following goals to  w/in 10-14 days:     OT Discharge Recommendation: Post acute rehabilitation services (vs Home OT pending pt's baseline level of functioning and available assist in home environment)

## 2023-06-05 NOTE — ASSESSMENT & PLAN NOTE
· Followed with Dr Burkett Client status post stent assisted coiling embolization of basilar artery in 2015

## 2023-06-05 NOTE — CONSULTS
Consult - Urology   Hobart Blizzard 1950, 67 y o  male MRN: [id number]    Unit/Bed#: E5 -01 Encounter: [id number]      Assessment & Plan  Neurogenic bladder does not void at all on his own  Suprapubic catheter x 4-5 yrs  Suprapubic catheter exchanged today 20Fr with 10cc balloon  Clear yellow urine  Continue empiric antibiotics for UTI and early pyelonehpritis  Transition to PO antibiotics pending final urine culture and sensitivity  Followup with q6 week catheter exchanges by [facility] per orders  Catheter to gravity drainage  He no longer caps/plugs intermittently  See urology q1 year with US and occasional screening cystoscopy q2-3 yrs (both next due January 2024)    Urology will sign off but remain available for any further inpatient needs  Please feel free to contact the provider currently covering the Urology [facility] role for this campus with questions or concerns  Subjective: initially came to the hospital 2 days ago c/o palpitations and head pressure  Family also felt he was more confused than usual  He was ultimately diagnosed with urinary tract infection ct had some early changes of left pyelonephritis  He is improving with IV antibiotics  He feels much more clear in general today  Urology has been consulted for suprapubic catheter exchange  Date of last exchange is unknown he feels is near due, usually performed by [facility] q6 weeks  Review of Systems   Constitutional: Positive for activity change and appetite change  Negative for chills and fever  Respiratory: Negative  Cardiovascular: Negative  Genitourinary: Positive for difficulty urinating (catheter dependent 5 yrs)  Negative for decreased urine volume, dysuria, flank pain, frequency, hematuria, testicular pain and urgency  Musculoskeletal: Negative  Objective:  Vitals: Blood pressure 136/63, pulse 81, temperature 99 1 °F (37 3 °C), temperature source Oral, resp   rate 16, weight 75 2 kg (165 lb 12 6 oz), SpO2 92 %  ,Body mass index is 28 46 kg/m²  Physical Exam  Vitals and nursing note reviewed  Constitutional:       Appearance: Normal appearance  He is well-developed  He is not ill-appearing or diaphoretic  HENT:      Head: Normocephalic and atraumatic  Cardiovascular:      Rate and Rhythm: Normal rate and regular rhythm  Heart sounds: Normal heart sounds  No murmur heard  Pulmonary:      Effort: Pulmonary effort is normal       Breath sounds: Normal breath sounds  Abdominal:      General: Bowel sounds are normal       Palpations: Abdomen is soft  Tenderness: There is no abdominal tenderness  Comments: Suprapubic cystostomy with some mild drainage  Existing 20Fr catheter removed and exchanged in usual sterile fashion for clear yellow urine  Secured to stat lock  Musculoskeletal:         General: Normal range of motion  Skin:     General: Skin is warm and dry  Capillary Refill: Capillary refill takes less than 2 seconds  Coloration: Skin is not pale  Neurological:      Mental Status: He is alert and oriented to person, place, and time  Imaging:    CT chest abdomen pelvis w contrast [[id number]] Collected: 06/03/23 0631   Order Status: Completed Updated: 06/03/23 0641   Narrative:     CT CHEST, ABDOMEN AND PELVIS WITH IV CONTRAST     INDICATION:   Sepsis   sepsis, ro other etiology  COMPARISON: Abdominal pelvic CT performed August 7, 2020  TECHNIQUE: CT examination of the chest, abdomen and pelvis was performed  Multiplanar 2D reformatted images were created from the source data  This examination, like all CT scans performed in the [facility], was performed utilizing techniques to minimize radiation dose exposure, including the use of iterative reconstruction and automated exposure control   Radiation dose length   product (DLP) for this visit:  615 mGy-cm     IV Contrast:  100 mL of iohexol (OMNIPAQUE)   Enteric Contrast: Enteric contrast was administered  FINDINGS:     CHEST     LUNGS: Few scattered punctate calcified granulomata  No suspicious noncalcified pulmonary mass  Mild atelectatic changes in the dependent lower lung zones  No consolidative or groundglass airspace opacity to suggest acute pneumonia  No tracheal or   endobronchial lesion  PLEURA:  Unremarkable  HEART/GREAT VESSELS: Coronary artery calcification  Atherosclerotic calcifications associated with aortic arch and great vessels  No thoracic aortic aneurysm  MEDIASTINUM AND ERICA: Small sliding-type hiatal hernia  No mediastinal or hilar lymphadenopathy  CHEST WALL AND LOWER NECK:  Unremarkable  ABDOMEN     LIVER/BILIARY TREE:  Unremarkable  GALLBLADDER:  No calcified gallstones  No pericholecystic inflammatory change  SPLEEN:  Unremarkable  PANCREAS:  Unremarkable  ADRENAL GLANDS:  Unremarkable  KIDNEYS/URETERS: Very mild left perinephric stranding along with subtle small wedge-shaped areas of nephrographic hypoenhancement in the upper pole of the left kidney on image 92 of series 601 and in the lateral interpolar left kidney on image 102 of   series 601 are suspicious for very mild changes of acute pyelonephritis  Mild bilateral perinephric stranding     Cortical cyst at the lower pole of the left kidney, 19 mm  No urinary tract calculi  No hydronephrosis  No suspicious solid renal mass  STOMACH AND BOWEL: Large and typical volume of stool seen in the colon and especially in the rectum where a large bolus of stool is present  Mild rectal wall thickening with mild perirectal inflammatory stranding consistent with stercoral proctitis  Small sliding-type hiatal hernia  Otherwise unremarkable CT appearance of stomach and small intestines  APPENDIX:  No findings to suggest appendicitis  ABDOMINOPELVIC CAVITY:  No ascites   No pneumoperitoneum   No lymphadenopathy  VESSELS:  Unremarkable for patient's age       PELVIS REPRODUCTIVE ORGANS:  Unremarkable for patient's age  URINARY BLADDER: There is a suprapubic catheter in the lumen of the bladder  However, the bladder is not decompressed but rather moderately distended with simple appearing fluid and few bubbles of gas  Mild uniform bladder wall thickening without   significant perivesical inflammatory stranding noted  ABDOMINAL WALL/INGUINAL REGIONS: Suprapubic catheter  Otherwise unremarkable  OSSEOUS STRUCTURES:  No acute fracture or destructive osseous lesion  Impression:       Findings suggestive of urinary tract infection including subtle areas of wedge-shaped nephrographic hypoenhancement in the left kidney suspicious for a mild changes of left-sided pyelonephritis    Also, the urinary bladder is not collapsed despite the   presence of an intraluminal Cage catheter balloon and there is mild uniform bladder wall thickening  No other findings to account for sepsis  Constipation with stercoral proctitis  Imaging reviewed - both report and images personally reviewed       Labs:  Recent Labs     23  0458   WBC 15 61* 13 18* 10 39*     Recent Labs     2313 23  0458   HGB 13 8 11 8* 12 1       Recent Labs     23  0458   CREATININE 0 76 0 89 0 83       Microbiology:  ucx sentpending    History:  Social History     Socioeconomic History   • Marital status: Single     Spouse name: None   • Number of children: None   • Years of education: None   • Highest education level: None   Occupational History   • Occupation:    retired   Tobacco Use   • Smoking status: Former     Packs/day: 0 50     Years: 31 00     Total pack years: 15 50     Types: Cigarettes     Start date:      Quit date:      Years since quittin 4   • Smokeless tobacco: Never   Vaping Use   • Vaping Use: Never used   Substance and Sexual Activity   • Alcohol use: Not Currently   • Drug use: No   • Sexual activity: Never   Other Topics Concern   • None   Social History Narrative   • None     Social Determinants of Health     Financial Resource Strain: Not on file   Food Insecurity: No Food Insecurity (4/14/2023)    Hunger Vital Sign    • Worried About Running Out of Food in the Last Year: Never true    • Ran Out of Food in the Last Year: Never true   Transportation Needs: No Transportation Needs (4/14/2023)    PRAPARE - Transportation    • Lack of Transportation (Medical): No    • Lack of Transportation (Non-Medical): No   Physical Activity: Not on file   Stress: Not on file   Social Connections: Not on file   Intimate Partner Violence: Not on file   Housing Stability: Low Risk  (4/14/2023)    Housing Stability Vital Sign    • Unable to Pay for Housing in the Last Year: No    • Number of Places Lived in the Last Year: 1    • Unstable Housing in the Last Year: No     Financial Resource Strain: Not on file   Food Insecurity: No Food Insecurity (4/14/2023)    Hunger Vital Sign    • Worried About Running Out of Food in the Last Year: Never true    • Ran Out of Food in the Last Year: Never true   Transportation Needs: No Transportation Needs (4/14/2023)    PRAPARE - Transportation    • Lack of Transportation (Medical): No    • Lack of Transportation (Non-Medical):  No   Physical Activity: Not on file   Stress: Not on file   Social Connections: Not on file   Intimate Partner Violence: Not on file   Housing Stability: Low Risk  (4/14/2023)    Housing Stability Vital Sign    • Unable to Pay for Housing in the Last Year: No    • Number of Places Lived in the Last Year: 1    • Unstable Housing in the Last Year: No      Diagnosis Date   • Acute laryngitis    • Acute nonsuppurative otitis media, unspecified laterality    • Arm weakness    • Arthritis    • 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[facility])    • Bladder infection    • Bronchitis    • Constipation    • Cough    • Diabetes mellitus ([facility] ) • Dizziness    • Dysfunction of eustachian tube    • Erectile dysfunction of non-organic origin    • Fatigue    • Glaucoma    • Hiatal hernia    • Hypertension    • Imbalance    • Leg muscle spasm    • MS (multiple sclerosis) (HCC)    • Nephrolithiasis    • Neurogenic bladder    • No natural teeth    • Sinus pain    • Spinal stenosis    • Strain of thoracic region    • Stroke [facility])    • Suprapubic catheter [facility])      Past Surgical History:   Procedure Laterality Date   • APPENDECTOMY     • BRAIN SURGERY      Coil placed in aneurysm   • CYSTOSCOPY     • CYSTOSCOPY     • CYSTOSCOPY  06/11/2018   • CYSTOSCOPY  01/15/2021   • EYE SURGERY      transscleral cyclophotocoagulation noncontact YAG laser   • MYRINGOTOMY      with ventilation tube insertion   • MA LITHOLAPAXY SMPL/SM <2 5 CM N/A 5/7/2019    Procedure: CYSTOSCOPY, holmium laser litholapaxy of bladder stones, EXCHANGE OF SP TUBE;  Surgeon: Waqas Tate MD;  Location: [facility];  Service: Urology   • SUPRAPUBIC CATHETER INSERTION       Family History   Problem Relation Age of Onset   • Heart attack Mother    • Stroke Mother    • Heart attack Father    • Anuerysm Father         In [address] [place] [address]  Date: 6/5/2023 Time: 10:33 AM

## 2023-06-05 NOTE — PROGRESS NOTES
[address] [place] [address]  Progress Note  Name: Brittney Munroe I  MRN: [id number]  Unit/Bed#: E5 -01 I Date of Admission: 6/3/2023   Date of Service: 6/5/2023 I Hospital Day: 2    Assessment/Plan   * Sepsis [facility])  Assessment & Plan  · Patient presented meeting sepsis criteria in the setting of left sided pyelonephritis with chronic suprapubic dela cruz catheter from neurogenic bladder with longstanding history of MS  · Continue Ceftriaxone Day #2/5  Urosepsis present on admission with CT abdomen pelvis with findings suspicious for left-sided pyelonephritis      Constipation  Assessment & Plan  · Noted on CT scan for large and typical volume stool seen in the colon  · bowel regimen    Chronic diastolic congestive heart failure (HCC)  Assessment & Plan  Wt Readings from Last 3 Encounters:   06/05/23 75 2 kg (165 lb 12 6 oz)   04/14/23 81 6 kg (179 lb 14 3 oz)   02/27/23 81 6 kg (180 lb)       · Resume home diuretic dose      Acute metabolic encephalopathy  Assessment & Plan  · Toxic metabolic encephalopathy in the setting of acute infection although unclear if that is his past his baseline  · Reports his mental status is improved    History of CVA (cerebrovascular accident)  Assessment & Plan  · History of CVA October 2018  · Continue Plavix and Lipitor    Diabetes mellitus [facility])  Assessment & Plan  Lab Results   Component Value Date    HGBA1C 9 2 (H) 06/03/2023       Recent Labs     06/04/23  2059 06/05/23  0654 06/05/23  1053 06/05/23  1602   POCGLU 228* 155* 288* 188*       Blood Sugar Average: Last 72 hrs:  (P) 194 [id number]   · Updated A1c demonstrates poor diabetes control with increase to 9 2%   · lantus 5 units qAM added yesterday, monitor for titration   · Hypoglycemia protocol   · ADA diet   · SSI, accuchecks   · Maintained on [facility] outpatient       Multiple sclerosis ([facility] )  Assessment & Plan  · History of longstanding multiple sclerosis, wheelchair-bound with neurogenic bladder "and chronic suprapubic Cage catheter  · Not on a disease modifying agent   · Continue gabapentin 300 mg bid and 600 mg at bedtime  · Follows with Neurology outpatient     Chronic suprapubic catheter [facility])  Assessment & Plan  · Neurogenic bladder with chronic suprapubic catheter  · Urology consulted for exchange consideration   · Monitor I's/O    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HCC)  Assessment & Plan  · Followed with Dr Magalys Cervantes status post stent assisted coiling embolization of basilar artery in 2015     Cyst of left kidney-resolved as of 6/5/2023  Assessment & Plan  · Noted on CT \"Cortical cyst at the lower pole of the left kidney, 19 mm  No urinary tract calculi  No hydronephrosis  No suspicious solid renal mass  \"     Depression-resolved as of 6/5/2023  Assessment & Plan  · Continue Zoloft and Xanax as needed  · PDMP reviewed           [facility] Internal Medicine Progress Note  Patient: Jesse Artis 67 y o  male   MRN: [id number]  PCP: Haja Dupree DO  Unit/Bed#: E5 -15 Encounter: [id number]  Date Of Visit: 06/05/23        Hospital Course:     23-year-old male patient with a history of multiple sclerosis who presented with flank pain, confusion  Found to have pyelonephritis as well as sepsis of urinary etiology  He remains on ceftriaxone, mental status improving  Suprapubic tube catheter changed out for source control    Assessment:      Principal Problem:    Sepsis ([facility] )  Active Problems:    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HCC)    Chronic suprapubic catheter ([facility] )    Multiple sclerosis ([facility] )    Diabetes mellitus ([facility] )    History of CVA (cerebrovascular accident)    Acute metabolic encephalopathy    Chronic diastolic congestive heart failure (HCC)    Constipation      Plan:    · Continue ceftriaxone  · PT OT consults  · Suprapubic tube catheter change out       VTE Pharmacologic Prophylaxis:   Pharmacologic: Enoxaparin (Lovenox)  Mechanical VTE Prophylaxis in Place: Yes    Patient Centered Rounds:  I " have performed bedside rounds with nursing staff today  Discussions with Specialists or Other Care Team Provider: Discussed with case management    Education and Discussions with Family / Patient: Reached VM, no VM left    Time Spent for Care: 1 hour  More than 50% of total time spent on counseling and coordination of care as described above  Current Length of Stay: 2 day(s)    Current Patient Status: Inpatient   Certification Statement: The patient will continue to require additional inpatient hospital stay due to IV antibiotics    Discharge Plan / Estimated Discharge Date: 48-hour    Code Status: Level 1 - Full Code      Subjective:   Seen and examined, no acute complaints  No nausea no vomiting, still confused  At one point thought the year was , another thought it was   Believes that the president is Dong Guerrero    A complete and comprehensive 14 point organ system review has been performed and all other systems are negative other than stated above  Objective:     Vitals:   Temp (24hrs), Av 7 °F (37 1 °C), Min:98 °F (36 7 °C), Max:99 1 °F (37 3 °C)    Temp:  [98 °F (36 7 °C)-99 1 °F (37 3 °C)] 98 6 °F (37 °C)  HR:  [71-81] 78  Resp:  [12-18] 12  BP: (128-136)/(59-66) 133/66  SpO2:  [89 %-93 %] 93 %  Body mass index is 28 46 kg/m²  Input and Output Summary (last 24 hours):        Intake/Output Summary (Last 24 hours) at 2023 1940  Last data filed at 2023 1853  Gross per 24 hour   Intake 340 ml   Output 2725 ml   Net -2385 ml       Physical Exam:     General: ill appearing, no acute distress  HEENT: atraumatic, PERRLA, moist mucosa, normal pharynx, normal tonsils and adenoids, normal tongue, no fluid in sinuses  Neck: Trachea midline, no carotid bruit, no masses  Respiratory: normal chest wall expansion, CTA B, no r/r/w, no rubs  Cardiovascular: RRR, no m/r/g, Normal S1 and S2  Abdomen: Soft, non-tender, non-distended, normal bowel sounds in all quadrants, no hepatosplenomegaly, no tympany  Rectal: deferred  Musculoskeletal: limited by MS  Integumentary: warm, dry, and pink, with no rash, purpura, or petechia  Heme/Lymph: no lymphadenopathy, no bruises  Neurological: Cranial Nerves II-XII grossly intact  Psychiatric: Confused      Additional Data:     Labs:    Results from last 7 days   Lab Units 06/05/23  0458   EOS PCT % 4   HEMATOCRIT % 37 1   HEMOGLOBIN g/dL 12 1   LYMPHS PCT % 23   MONOS PCT % 11   NEUTROS PCT % 60   PLATELETS Thousands/uL 255   WBC Thousand/uL 10 39*     Results from last 7 days   Lab Units 06/05/23  0458 06/04/23  0513   ALK PHOS U/L  --  86   ALT U/L  --  15   AST U/L  --  13   BUN mg/dL 7 8   CALCIUM mg/dL 8 8 8 5   CHLORIDE mmol/L 104 102   CO2 mmol/L 23 23   CREATININE mg/dL 0 83 0 89   POTASSIUM mmol/L 3 7 3 6     Results from last 7 days   Lab Units 06/03/23  0214   INR  0 89       * I Have Reviewed All Lab Data Listed Above  * Additional Pertinent Lab Tests Reviewed: [address] 65 Admission Reviewed    Imaging:    Imaging Reports Reviewed Today Include: None  Imaging Personally Reviewed by Myself Includes: None    Recent Cultures (last 7 days):     Results from last 7 days   Lab Units 06/03/23  0255 06/03/23  0252 06/03/23  0251   BLOOD CULTURE   --  No Growth at 48 hrs  No Growth at 48 hrs     URINE CULTURE  >100,000 cfu/ml Escherichia coli*  60,000-69,000 cfu/ml Alpha Hemolytic Streptococcus*  --   --        Last 24 Hours Medication List:   Current Facility-Administered Medications   Medication Dose Route Frequency Provider Last Rate   • acetaminophen  650 mg Oral Q6H PRN Maria Luisa Nova PA-C     • albuterol  2 5 mg Nebulization Q6H PRN Meme Aguilar PA-C     • ALPRAZolam  0 25 mg Oral BID PRN Maria Luisa Nova PA-C     • amLODIPine  10 mg Oral Daily Meme Aguilar PA-C     • atorvastatin  80 mg Oral Daily With Belkis Peters PA-C     • calcium carbonate  1,000 mg Oral Daily PRN Maria Luisa Nova PA-C     • cefTRIAXone  1,000 mg Intravenous Q24H Roberth Howe PA-C 1,000 mg (06/05/23 0539)   • clopidogrel  75 mg Oral Daily Meme Aguilar PA-C     • enoxaparin  40 mg Subcutaneous Daily Meme Aguilar PA-C     • [START ON 6/6/2023] furosemide  40 mg Oral Daily Soto Messer DO     • gabapentin  300 mg Oral BID Roberth Howe PA-C     • gabapentin  600 mg Oral HS Meme Aguilar PA-C     • insulin glargine  8 Units Subcutaneous QAM Meme Aguilar PA-C     • insulin lispro  1-6 Units Subcutaneous TID AC Meme Aguilar PA-C     • insulin lispro  1-6 Units Subcutaneous HS Meme Aguilar PA-C     • insulin lispro  3 Units Subcutaneous TID With Meals Meme Aguilar PA-C     • latanoprost  1 drop Both Eyes HS Meme Aguilar PA-C     • melatonin  3 mg Oral HS PRN Roberth Howe PA-C     • ondansetron  4 mg Intravenous Q6H PRN Meme Aguilar PA-C     • pantoprazole  40 mg Oral Early Morning Meme Aguilar PA-C     • polyethylene glycol  17 g Oral Daily PRN Roberth Howe PA-C     • propranolol  60 mg Oral Daily Meme Aguilar PA-C     • senna-docusate sodium  2 tablet Oral HS Meme Aguilar PA-C     • sertraline  25 mg Oral HS Roberth Howe PA-C          Today, Patient Was Seen By: Cecilia Gill DO    ** Please Note: This note was completed in part utilizing [employer] Software  Grammatical errors, random word insertions, spelling mistakes, and incomplete sentences may be an occasional consequence of this system secondary to software limitations, ambient noise, and hardware issues  If you have any questions or concerns about the content, text, or information contained within the body of this dictation, please contact the provider for clarification   **

## 2023-06-05 NOTE — ASSESSMENT & PLAN NOTE
· Toxic metabolic encephalopathy in the setting of acute infection although unclear if that is his past his baseline  · Reports his mental status is improved

## 2023-06-06 VITALS
OXYGEN SATURATION: 91 % | SYSTOLIC BLOOD PRESSURE: 130 MMHG | RESPIRATION RATE: 16 BRPM | TEMPERATURE: 98 F | BODY MASS INDEX: 28.8 KG/M2 | DIASTOLIC BLOOD PRESSURE: 58 MMHG | HEART RATE: 78 BPM | WEIGHT: 167.77 LBS

## 2023-06-06 LAB
ANION GAP SERPL CALCULATED.3IONS-SCNC: 7 MMOL/L (ref 4–13)
BACTERIA UR CULT: ABNORMAL
BASOPHILS # BLD AUTO: 0.09 THOUSANDS/ÂΜL (ref 0–0.1)
BASOPHILS NFR BLD AUTO: 1 % (ref 0–1)
BUN SERPL-MCNC: 7 MG/DL (ref 5–25)
CALCIUM SERPL-MCNC: 8.8 MG/DL (ref 8.4–10.2)
CHLORIDE SERPL-SCNC: 103 MMOL/L (ref 96–108)
CO2 SERPL-SCNC: 24 MMOL/L (ref 21–32)
CREAT SERPL-MCNC: 0.83 MG/DL (ref 0.6–1.3)
EOSINOPHIL # BLD AUTO: 0.62 THOUSAND/ÂΜL (ref 0–0.61)
EOSINOPHIL NFR BLD AUTO: 7 % (ref 0–6)
ERYTHROCYTE [DISTWIDTH] IN BLOOD BY AUTOMATED COUNT: 13.7 % (ref 11.6–15.1)
GFR SERPL CREATININE-BSD FRML MDRD: 87 ML/MIN/1.73SQ M
GLUCOSE SERPL-MCNC: 149 MG/DL (ref 65–140)
GLUCOSE SERPL-MCNC: 149 MG/DL (ref 65–140)
HCT VFR BLD AUTO: 37.5 % (ref 36.5–49.3)
HGB BLD-MCNC: 12.4 G/DL (ref 12–17)
IMM GRANULOCYTES # BLD AUTO: 0.06 THOUSAND/UL (ref 0–0.2)
IMM GRANULOCYTES NFR BLD AUTO: 1 % (ref 0–2)
LYMPHOCYTES # BLD AUTO: 3.3 THOUSANDS/ÂΜL (ref 0.6–4.47)
LYMPHOCYTES NFR BLD AUTO: 35 % (ref 14–44)
MCH RBC QN AUTO: 29.2 PG (ref 26.8–34.3)
MCHC RBC AUTO-ENTMCNC: 33.1 G/DL (ref 31.4–37.4)
MCV RBC AUTO: 88 FL (ref 82–98)
MONOCYTES # BLD AUTO: 1.3 THOUSAND/ÂΜL (ref 0.17–1.22)
MONOCYTES NFR BLD AUTO: 14 % (ref 4–12)
NEUTROPHILS # BLD AUTO: 4.08 THOUSANDS/ÂΜL (ref 1.85–7.62)
NEUTS SEG NFR BLD AUTO: 42 % (ref 43–75)
NRBC BLD AUTO-RTO: 0 /100 WBCS
PLATELET # BLD AUTO: 269 THOUSANDS/UL (ref 149–390)
PMV BLD AUTO: 8.7 FL (ref 8.9–12.7)
POTASSIUM SERPL-SCNC: 3.8 MMOL/L (ref 3.5–5.3)
RBC # BLD AUTO: 4.24 MILLION/UL (ref 3.88–5.62)
SODIUM SERPL-SCNC: 134 MMOL/L (ref 135–147)
WBC # BLD AUTO: 9.45 THOUSAND/UL (ref 4.31–10.16)

## 2023-06-06 PROCEDURE — 82948 REAGENT STRIP/BLOOD GLUCOSE: CPT

## 2023-06-06 PROCEDURE — 99239 HOSP IP/OBS DSCHRG MGMT >30: CPT | Performed by: INTERNAL MEDICINE

## 2023-06-06 PROCEDURE — 85025 COMPLETE CBC W/AUTO DIFF WBC: CPT | Performed by: PHYSICIAN ASSISTANT

## 2023-06-06 PROCEDURE — 80048 BASIC METABOLIC PNL TOTAL CA: CPT | Performed by: PHYSICIAN ASSISTANT

## 2023-06-06 RX ORDER — DOXYCYCLINE HYCLATE 100 MG/1
100 CAPSULE ORAL EVERY 12 HOURS SCHEDULED
Status: DISCONTINUED | OUTPATIENT
Start: 2023-06-06 | End: 2023-06-06 | Stop reason: HOSPADM

## 2023-06-06 RX ORDER — DOXYCYCLINE HYCLATE 100 MG/1
100 CAPSULE ORAL EVERY 12 HOURS SCHEDULED
Qty: 13 CAPSULE | Refills: 0 | Status: SHIPPED | OUTPATIENT
Start: 2023-06-06 | End: 2023-06-13

## 2023-06-06 RX ADMIN — DOXYCYCLINE 100 MG: 100 CAPSULE ORAL at 11:53

## 2023-06-06 RX ADMIN — CEFTRIAXONE 1000 MG: 10 INJECTION, POWDER, FOR SOLUTION INTRAVENOUS at 06:40

## 2023-06-06 RX ADMIN — AMLODIPINE BESYLATE 10 MG: 10 TABLET ORAL at 08:18

## 2023-06-06 RX ADMIN — INSULIN LISPRO 3 UNITS: 100 INJECTION, SOLUTION INTRAVENOUS; SUBCUTANEOUS at 11:53

## 2023-06-06 RX ADMIN — ENOXAPARIN SODIUM 40 MG: 100 INJECTION SUBCUTANEOUS at 08:18

## 2023-06-06 RX ADMIN — PROPRANOLOL HYDROCHLORIDE 60 MG: 60 CAPSULE, EXTENDED RELEASE ORAL at 08:19

## 2023-06-06 RX ADMIN — CLOPIDOGREL BISULFATE 75 MG: 75 TABLET ORAL at 08:19

## 2023-06-06 RX ADMIN — FUROSEMIDE 40 MG: 40 TABLET ORAL at 08:18

## 2023-06-06 RX ADMIN — GABAPENTIN 300 MG: 300 CAPSULE ORAL at 08:19

## 2023-06-06 RX ADMIN — PANTOPRAZOLE SODIUM 40 MG: 40 TABLET, DELAYED RELEASE ORAL at 06:40

## 2023-06-06 RX ADMIN — INSULIN LISPRO 3 UNITS: 100 INJECTION, SOLUTION INTRAVENOUS; SUBCUTANEOUS at 08:19

## 2023-06-06 RX ADMIN — INSULIN GLARGINE 8 UNITS: 100 INJECTION, SOLUTION SUBCUTANEOUS at 08:18

## 2023-06-06 RX ADMIN — INSULIN LISPRO 2 UNITS: 100 INJECTION, SOLUTION INTRAVENOUS; SUBCUTANEOUS at 11:53

## 2023-06-06 NOTE — ASSESSMENT & PLAN NOTE
· Patient presented meeting sepsis criteria in the setting of left sided pyelonephritis with chronic suprapubic dela cruz catheter from neurogenic bladder with longstanding history of MS  · Continue Ceftriaxone Day #2/5, found to have Enterococcus as well as E   Coli  · On sensitivities will transition to doxycycline to complete a 7-day course  Urosepsis present on admission with CT abdomen pelvis with findings suspicious for left-sided pyelonephritis

## 2023-06-06 NOTE — ASSESSMENT & PLAN NOTE
· Neurogenic bladder with chronic suprapubic catheter  · Suprapubic change performed on 6/5/2023  · Follow-up in 6 weeks for change out through [facility]

## 2023-06-06 NOTE — ASSESSMENT & PLAN NOTE
· Followed with Dr Kruger Cost status post stent assisted coiling embolization of basilar artery in 2015

## 2023-06-06 NOTE — DISCHARGE INSTR - AVS FIRST PAGE
Dear Adelaida Obrien,     It was our pleasure to care for you here at [facility]  It is our hope that we were always able to exceed the expected standards for your care during your stay  You were hospitalized due to pyelonephritis as a result of catheter associated urinary tract infection  You were cared for on the fifth floor by Belinda Guadarrama DO with the [facility] who covers for your primary care physician (PCP), Roberth Guadalupe DO, while you were hospitalized  If you have any questions or concerns related to this hospitalization, you may contact us at [phone]  For follow up as well as any medication refills, we recommend that you follow up with your primary care physician  A registered nurse will reach out to you by phone within a few days after your discharge to answer any additional questions that you may have after going home  However, at this time we provide for you here, the most important instructions / recommendations at discharge:     Notable Medication Adjustments -   Doxycycline to complete course of abx  Testing Required after Discharge -   See Below  Important follow up information -   6 week catheter exchange per [facility] order  Other Instructions -   Followup with q6 week catheter exchanges by [facility] per orders  Catheter to gravity drainage  See urology q1 year with US and occasional screening cystoscopy q2-3 yrs (both next due January 2024)  Please review this entire after visit summary as additional general instructions including medication list, appointments, activity, diet, any pertinent wound care, and other additional recommendations from your care team that may be provided for you        Sincerely,     Belinda Guadarrama DO and Nurse Yumiko Epstein

## 2023-06-06 NOTE — DISCHARGE SUMMARY
[facility]  Discharge- Syd Diez 1950, 67 y o  male MRN: [id number]  Unit/Bed#: E5 -01 Encounter: [id number]  Primary Care Provider: Marielena Vallecillo DO   Date and time admitted to hospital: 6/3/2023  1:41 AM      Admitting Provider:  Nuno Huber MD  Discharge Provider:  Jasper Siegel DO  Admission Date: 6/3/2023       Discharge Date: 06/06/23   LOS: 3  Primary Care Physician at Discharge: Main[id number] COURSE:  Syd Diez is a 67 y o  male who presented fevers and chills as well as heart palpitations and head pressure  He was noted to be more confused than usual   Work-up in the emergency room was notable for catheter associated urinary tract infection present on admission from previous suprapubic catheter  The patient was also found to have pyelonephritis  He was placed on intravenous antibiotics with ceftriaxone  He did clinically improve  He received 3 days of ceftriaxone and was subsequently transitioned to doxycycline given evidence of Enterococcus in his urine as well  Urology consultation was obtained for suprapubic catheter exchange, the date of last exchange was unknown  His catheter changed out to use a performed by [employer] every 6 weeks and will have scheduled outpatient follow-up to continue with this  The patient's mental status did improve back to baseline  He was evaluated by physical therapy and Occupational Therapy recommended home with home  At the time of discharge patient was tolerating oral diet without acute complaint was medically optimized for discharge home  All questions answered the patient's satisfaction he is in agreement with the discharge plan        DISCHARGE DIAGNOSES  * Sepsis ([address] )  Assessment & Plan  · Patient presented meeting sepsis criteria in the setting of left sided pyelonephritis with chronic suprapubic dela cruz catheter from neurogenic bladder with longstanding history of MS  · Continue Ceftriaxone Day #2/5, found to have Enterococcus as well as E   Coli  · On sensitivities will transition to doxycycline to complete a 7-day course  Urosepsis present on admission with CT abdomen pelvis with findings suspicious for left-sided pyelonephritis      Constipation  Assessment & Plan  · Noted on CT scan for large and typical volume stool seen in the colon  · bowel regimen    Chronic diastolic congestive heart failure (HCC)  Assessment & Plan  Wt Readings from Last 3 Encounters:   06/06/23 76 1 kg (167 lb 12 3 oz)   04/14/23 81 6 kg (179 lb 14 3 oz)   02/27/23 81 6 kg (180 lb)       · Resume home diuretic dose      Acute metabolic encephalopathy  Assessment & Plan  · Toxic metabolic encephalopathy in the setting of acute infection although unclear if that is his past his baseline  · Reports his mental status is improved    History of CVA (cerebrovascular accident)  Assessment & Plan  · History of CVA October 2018  · Continue Plavix and Lipitor    Diabetes mellitus [facility])  Assessment & Plan  Lab Results   Component Value Date    HGBA1C 9 2 (H) 06/03/2023       Recent Labs     06/05/23  1053 06/05/23  1602 06/05/23  2049 06/06/23  0717   POCGLU 288* 188* 228* 149*       Blood Sugar Average: Last 72 hrs:  (P) 362 [id number]   · Updated A1c demonstrates poor diabetes control with increase to 9 2%   · Continue home regimen at discharge with planned outpatient follow-up      Multiple sclerosis ([facility] [address] )  Assessment & Plan  · History of longstanding multiple sclerosis, wheelchair-bound with neurogenic bladder and chronic suprapubic Cage catheter  · Not on a disease modifying agent   · Continue gabapentin 300 mg bid and 600 mg at bedtime  · Follows with Neurology outpatient     Chronic suprapubic catheter [facility])  Assessment & Plan  · Neurogenic bladder with chronic suprapubic catheter  · Suprapubic change performed on 6/5/2023  · Follow-up in 6 weeks for change out through [facility]    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[address] )  Assessment & Plan  · Followed with Dr Sandra Carlos status post stent assisted coiling embolization of basilar artery in 2015       CONSULTING PROVIDERS   IP CONSULT TO UROLOGY    PROCEDURES PERFORMED  * No surgery found *    RADIOLOGY RESULTS  XR chest 2 views      Impression: No acute cardiopulmonary disease  CT chest abdomen pelvis w contrast    Result Date: 6/3/2023    Impression: Findings suggestive of urinary tract infection including subtle areas of wedge-shaped nephrographic hypoenhancement in the left kidney suspicious for a mild changes of left-sided pyelonephritis    Also, the urinary bladder is not collapsed despite the presence of an intraluminal Cage catheter balloon and there is mild uniform bladder wall thickening  No other findings to account for sepsis  Constipation with stercoral proctitis         LABS  Results from last 7 days   Lab Units 06/06/23 0451 06/05/23 0458 06/04/23 0513 06/03/23 0214   HEMATOCRIT % 37 5 37 1 36 7 41 6   HEMOGLOBIN g/dL 12 4 12 1 11 8* 13 8   INR   --   --   --  0 89   MCV fL 88 88 89 87   PLATELETS Thousands/uL 269 255 263 317   WBC Thousand/uL 9 45 10 39* 13 18* 15 61*     Results from last 7 days   Lab Units 06/06/23 0451 06/05/23 0458 06/04/23 0513 06/03/23 0214   ALBUMIN g/dL  --   --  3 0* 3 6   ALK PHOS U/L  --   --  86 93   ALT U/L  --   --  15 19   AST U/L  --   --  13 13   BUN mg/dL 7 7 8 10   CALCIUM mg/dL 8 8 8 8 8 5 9 1   CHLORIDE mmol/L 103 104 102 96   CO2 mmol/L 24 23 23 23   CREATININE mg/dL 0 83 0 83 0 89 0 76   EGFR ml/min/1 73sq m 87 87 84 90   GLUCOSE RANDOM mg/dL 149* 162* 164* 291*   POTASSIUM mmol/L 3 8 3 7 3 6 3 8   SODIUM mmol/L 134* 133* 132* 129*   TOTAL BILIRUBIN mg/dL  --   --  0 75 0 46     Results from last 7 days   Lab Units 06/03/23 0445 06/03/23 0214   HS TNI 0HR ng/L  --  6   HS TNI 2HR ng/L 6  --           Results from last 7 days   Lab Units 06/03/23  0214   D-DIMER QUANTITATIVE ug/ml FEU 0 46 Results from last 7 days   Lab Units 06/06/23  0717 06/05/23  2049 06/05/23  1602 06/05/23  1053 06/05/23  0654 06/04/23  2059 06/04/23  1557 06/04/23  1056 06/04/23  0724 06/03/23  2145   POC GLUCOSE mg/dl 149* 228* 188* 288* 155* 228* 146* 324* 152* 167*     Results from last 7 days   Lab Units 06/03/23  0214   HEMOGLOBIN A1C % 9 2*         Results from last 7 days   Lab Units 06/03/23  0536 06/03/23  0251 06/03/23  0214   LACTIC ACID mmol/L 1 6 2 2*  --    PROCALCITONIN ng/ml  --   --  0 09           Cultures:   Results from last 7 days   Lab Units 06/03/23  0255   BILIRUBIN UA  Negative   BLOOD UA  Negative   CLARITY UA  Turbid   COLOR UA  Colorless   GLUCOSE UA mg/dl >=1000 (1%)*   KETONES UA mg/dl Negative   LEUKOCYTES UA  Large*   NITRITE UA  Positive*   PH UA  6 0   SPEC GRAV UA  1 017      Results from last 7 days   Lab Units 06/03/23  0255   BACTERIA UA /hpf Moderate*   EPITHELIAL CELLS WET PREP /hpf None Seen   RBC UA /hpf 2-4*   WBC UA /hpf Innumerable*      Results from last 7 days   Lab Units 06/03/23  0255 06/03/23  0252 06/03/23  0251   BLOOD CULTURE   --  No Growth at 72 hrs  No Growth at 72 hrs     URINE CULTURE  >100,000 cfu/ml Escherichia coli*  60,000-69,000 cfu/ml Enterococcus faecalis*  10,000-19,000 cfu/ml  --   --        PHYSICAL EXAM:  Vitals:   Blood Pressure: 130/58 (06/06/23 0709)  Pulse: 78 (06/06/23 0709)  Temperature: 98 °F (36 7 °C) (06/06/23 0709)  Temp Source: Oral (06/06/23 0709)  Respirations: 16 (06/06/23 0709)  Weight - Scale: 76 1 kg (167 lb 12 3 oz) (06/06/23 0552)  SpO2: 91 % (06/06/23 0709)      General: ill  appearing, no acute distress  HEENT: atraumatic, PERRLA, moist mucosa, normal pharynx, normal tonsils and adenoids, normal tongue, no fluid in sinuses  Neck: Trachea midline, no carotid bruit, no masses  Respiratory: normal chest wall expansion, CTA B, no r/r/w, no rubs  Cardiovascular: RRR, no m/r/g, Normal S1 and S2  Abdomen: Soft, non-tender, non-distended, normal bowel sounds in all quadrants, no hepatosplenomegaly, no tympany  Rectal: deferred  Musculoskeletal: Moves all  Integumentary: warm, dry, and pink, with no rash, purpura, or petechia  Heme/Lymph: no lymphadenopathy, no bruises  Neurological: Cranial Nerves II-XII grossly intact,  Psychiatric: Apparent dementia      Discharge Disposition: Home/Self Care      Test Results Pending at Discharge:   Pending Labs     Order Current Status    Blood culture #1 Preliminary result    Blood culture #2 Preliminary result              Medications   · Summary of Medication Adjustments made as a result of this hospitalization: See discharge list  · Medication Dosing Tapers - Please refer to Discharge Medication List for details on any medication dosing tapers (if applicable to patient)  · Discharge Medication List: See after visit summary for reconciled discharge medications  Diet restrictions:         Diet Orders   (From admission, onward)             Start     Ordered    06/04/23 0718  Diet Regular; Regular House; Dysphagia 3-Dental Soft, Consistent Carbohydrate Diet Level 2 (5 carb servings/75 grams CHO/meal), Sodium 2 GM; Thin Liquid  (ED Bridging Orders Panel)  Diet effective now        References:    Adult Nutrition Support Algorithm    RD Therapeutic Diet Order Protocol   Question Answer Comment   Diet Type Regular    Regular Regular House    Other Restriction(s): Dysphagia 3-Dental Soft    Other Restriction(s): Consistent Carbohydrate Diet Level 2 (5 carb servings/75 grams CHO/meal)    Other Restriction(s): Sodium 2 GM    Liquid Modifier Thin Liquid    RD to adjust diet per protocol? Yes        06/04/23 0717              Activity restrictions: No strenuous activity  Discharge Condition: fair    Outpatient Follow-Up and Discharge Instructions  See after visit summary section titled Discharge Instructions for information provided to patient and family        Code Status: Level 1 - Full Code  Discharge Statement   I spent 35 minutes discharging the patient  This time was spent on the day of discharge  Greater than 50% of total time was spent with the patient and / or family counseling and / or coordination of care  ** Please Note: This note was completed in part utilizing M-Modal Fluency Direct Software  Grammatical errors, random word insertions, spelling mistakes, and incomplete sentences may be an occasional consequence of this system secondary to software limitations, ambient noise, and hardware issues  If you have any questions or concerns about the content, text, or information contained within the body of this dictation, please contact the provider for clarification  **

## 2023-06-06 NOTE — CASE MANAGEMENT
Case Management Assessment & Discharge Planning Note    Patient name Shahab Montenegro  Location East 5 /E5 MS 0-* MRN [id number]  : 1950 Date 2023       Current Admission Date: 6/3/2023  Current Admission Diagnosis:Sepsis [facility])   Patient Active Problem List    Diagnosis Date Noted   • Constipation 2023   • Chronic diastolic congestive heart failure ([address] ) 2023   • Acute metabolic encephalopathy    • Excessive daytime sleepiness 2022   • Shortness of breath 2022   • Head injury 2021   • Sepsis ([facility] [address] ) 2021   • Pancreatic mass 2020   • Pancreatic lesion 2020   • Bladder stones 2019   • Bladder neck contracture 2019   • History of CVA (cerebrovascular accident) 10/21/2018   • 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[facility] [address] ) 2017   • Diabetic neuropathy ([facility] [address] ) 2016   • Chronic suprapubic catheter ([facility] [address] ) 2016   • Cellulitis 2016   • Thyroid nodule 2015   • Cervical spinal stenosis 2014   • Generalized anxiety disorder 2014   • Confusion 2014   • Urinary retention 2014   • Obstructive sleep apnea 2013   • Benign colon polyp 2012   • Esophageal reflux 2012   • Fatty liver 2012   • Glaucoma 2012   • Hyperlipidemia 2012   • Multiple sclerosis ([facility] [address] ) 2012   • Diabetes mellitus ([facility] [address] ) 2012      LOS (days): 3  Geometric Mean LOS (GMLOS) (days): 5 00  Days to GMLOS:1 7     OBJECTIVE:    Risk of Unplanned Readmission Score: 12 87         Current admission status: Inpatient       Preferred Pharmacy:   [id number], [address] [place] [address]  Phone: [phone] Fax: [phone]    [address] [place] [address] Dr, [facility] [place] [address]  Phone: [phone] Fax: [phone]    Primary Care Provider: Juan Carter DO    Primary Insurance: [facility]  Secondary Insurance:     ASSESSMENT:  Active Health Care Proxies     [address] Representative - Brother   Primary Phone: [phone] (Home)                 Readmission Root Cause  30 Day Readmission: No    Patient Information  Admitted from[de-identified] Home  Mental Status: Alert  During Assessment patient was accompanied by: Not accompanied during assessment  Primary Caregiver: Other (Comment)  Caregiver's Name[de-identified] Alvy Halsted (Brother) Nel Fair (Sister)  [phone] (H)  Caregiver's Relationship to Patient[de-identified] Family Member  Caregiver's Telephone Number[de-identified] Alvy Halsted (Brother) Nel Fair (Sister)  [phone] (H)  Support Systems: Friends/neighbors, Family members  [place] of Residence: [address] do you live in?: [address] [place] [address] access options  Select all that apply : Ramp  Type of Current Residence: [place]  In the last 12 months, was there a time when you were not able to pay the mortgage or rent on time?: Yes  In the last 12 months, how many places have you lived?: 1  In the last 12 months, was there a time when you did not have a steady place to sleep or slept in a shelter (including now)?: No  Homeless/housing insecurity resource given?: N/A  Living Arrangements: Other (Comment) (Brother and Sister)  Is patient a ?: No    Activities of Daily Living Prior to Admission  Functional Status: Total dependent  Completes ADLs independently?: No  Level of ADL dependence: Total Dependent  Ambulates independently?: No  Level of ambulatory dependence:  Total Dependent  Does patient use assisted devices?: Yes  Assisted Devices (DME) used: Bedside Commode, Wheelchair, Hospital Bed, Shower Chair  Does patient currently own DME?: Yes  What DME does the patient currently own?: Bedside Commode, Wheelchair, Walker, [address], [address] Hospital Bed  Does patient have a history of Outpatient Therapy (PT/OT)?: Yes ([facility])  Does the patient have a history of Short-Term Rehab?: No  Does patient have a history of HHC?: No  Does patient currently have [address]?: No    Patient Information Continued  Income Source: SSI/SSD  Does patient have prescription coverage?: Yes  Within the past 12 months, you worried that your food would run out before you got the money to buy more : Never true  Within the past 12 months, the food you bought just didn't last and you didn't have money to get more : Never true  Food insecurity resource given?: N/A  Does patient receive dialysis treatments?: No  Does patient have a history of substance abuse?: No  Does patient have a history of Mental Health Diagnosis?: Yes (Depression)  Is patient receiving treatment for mental health?: Yes  Has patient received inpatient treatment related to mental health in the last 2 years?: No    Means of Transportation  Means of Transport to Appts[de-identified] Family transport  In the past 12 months, has lack of transportation kept you from medical appointments or from getting medications?: No  In the past 12 months, has lack of transportation kept you from meetings, work, or from getting things needed for daily living?: No  Was application for public transport provided?: N/A        DISCHARGE DETAILS:    Discharge planning discussed with[de-identified] Patient and Didi Hair (Brother) [phone] at bedside    [place] of Choice: Yes  Comments - Freedom of Choice: Pt will discharge home and resume services through [address] contacted family/caregiver?: Yes  Were Treatment Team discharge recommendations reviewed with patient/caregiver?: Yes  Did patient/caregiver verbalize understanding of patient care needs?: Yes  Were patient/caregiver advised of the risks associated with not following Treatment Team discharge recommendations?: Yes    Contacts  Patient Contacts: Didi Hair (Brother) [phone] at bedside  Relationship to Patient[de-identified] Family  Contact Method: Phone  Phone Number: Didi ShoreBrother) [phone] at bedside  Reason/Outcome: Continuity of Care, Emergency Contact, Discharge [address]         Is the patient interested in [facility] at discharge?: No  DME Referral Provided  Referral made for DME?: No    Discharge Destination Plan[de-identified] Home  Transport at Discharge : Wheelchair van  Dispatcher Contacted: Yes  Number/Name of Dispatcher: ROUNDTRIP  Transported by [employer] and Unit #): SUBURBAN  ETA of Transport (Date): 06/06/23  ETA of Transport (Time): 1400  Transfer Mode: Wheelchair  Accompanied by: EMS personnel    IMM Given (Date):: 06/06/23  IMM Given to[de-identified] Patient    Additional Comments: CM met w/ Pt and brother Shelbi Bocanegra to discuss dcp  Pt in agreement with discharging home and expects to resume op PT/OT with [facility]  Pt aware that his [facility] Nurse will come to his house to assess him for additional needs  Pt to transport home via [facility]

## 2023-06-06 NOTE — PLAN OF CARE
Problem: Potential for Falls  Goal: Patient will remain free of falls  Description: INTERVENTIONS:  - Educate patient/family on patient safety including physical limitations  - Instruct patient to call for assistance with activity   - Consult OT/PT to assist with strengthening/mobility   - Keep Call bell within reach  - Keep bed low and locked with side rails adjusted as appropriate  - Keep care items and personal belongings within reach  - Initiate and maintain comfort rounds  - Make Fall Risk Sign visible to staff  - Offer Toileting every  Hours, in advance of need  - Initiate/Maintain alarm  - Obtain necessary fall risk management equipmen  - Apply yellow socks and bracelet for high fall risk patients  - Consider moving patient to room near nurses station  Outcome: Progressing     Problem: MOBILITY - ADULT  Goal: Maintain or return to baseline ADL function  Description: INTERVENTIONS:  -  Assess patient's ability to carry out ADLs; assess patient's baseline for ADL function and identify physical deficits which impact ability to perform ADLs (bathing, care of mouth/teeth, toileting, grooming, dressing, etc )  - Assess/evaluate cause of self-care deficits   - Assess range of motion  - Assess patient's mobility; develop plan if impaired  - Assess patient's need for assistive devices and provide as appropriate  - Encourage maximum independence but intervene and supervise when necessary  - Involve family in performance of ADLs  - Assess for home care needs following discharge   - Consider OT consult to assist with ADL evaluation and planning for discharge  - Provide patient education as appropriate  Outcome: Progressing     Problem: GENITOURINARY - ADULT  Goal: Maintains or returns to baseline urinary function  Description: INTERVENTIONS:  - Assess urinary function  - Encourage oral fluids to ensure adequate hydration if ordered  - Administer IV fluids as ordered to ensure adequate hydration  - Administer ordered medications as needed  - Offer frequent toileting  - Follow urinary retention protocol if ordered  Outcome: Progressing  Goal: Absence of urinary retention  Description: INTERVENTIONS:  - Assess patient’s ability to void and empty bladder  - Monitor I/O  - Bladder scan as needed  - Discuss with physician/AP medications to alleviate retention as needed  - Discuss catheterization for long term situations as appropriate  Outcome: Progressing  Goal: Urinary catheter remains patent  Description: INTERVENTIONS:  - Assess patency of urinary catheter  - If patient has a chronic dela cruz, consider changing catheter if non-functioning  - Follow guidelines for intermittent irrigation of non-functioning urinary catheter  Outcome: Progressing     Problem: Prexisting or High Potential for Compromised Skin Integrity  Goal: Skin integrity is maintained or improved  Description: INTERVENTIONS:  - Identify patients at risk for skin breakdown  - Assess and monitor skin integrity  - Assess and monitor nutrition and hydration status  - Monitor labs   - Assess for incontinence   - Turn and reposition patient  - Assist with mobility/ambulation  - Relieve pressure over bony prominences  - Avoid friction and shearing  - Provide appropriate hygiene as needed including keeping skin clean and dry  - Evaluate need for skin moisturizer/barrier cream  - Collaborate with interdisciplinary team   - Patient/family teaching  - Consider wound care consult   Outcome: Progressing     Problem: INFECTION - ADULT  Goal: Absence or prevention of progression during hospitalization  Description: INTERVENTIONS:  - Assess and monitor for signs and symptoms of infection  - Monitor lab/diagnostic results  - Monitor all insertion sites, i e  indwelling lines, tubes, and drains  - Monitor endotracheal if appropriate and nasal secretions for changes in amount and color  - [place] appropriate cooling/warming therapies per order  - Administer medications as ordered  - Instruct and encourage patient and family to use good hand hygiene technique  - Identify and instruct in appropriate isolation precautions for identified infection/condition  Outcome: Progressing

## 2023-06-06 NOTE — PLAN OF CARE
Problem: Potential for Falls  Goal: Patient will remain free of falls  Description: INTERVENTIONS:  - Educate patient/family on patient safety including physical limitations  - Instruct patient to call for assistance with activity   - Consult OT/PT to assist with strengthening/mobility   - Keep Call bell within reach  - Keep bed low and locked with side rails adjusted as appropriate  - Keep care items and personal belongings within reach  - Initiate and maintain comfort rounds  - Make Fall Risk Sign visible to staff  - Apply yellow socks and bracelet for high fall risk patients  - Consider moving patient to room near nurses station  Outcome: Progressing     Problem: MOBILITY - ADULT  Goal: Maintain or return to baseline ADL function  Description: INTERVENTIONS:  -  Assess patient's ability to carry out ADLs; assess patient's baseline for ADL function and identify physical deficits which impact ability to perform ADLs (bathing, care of mouth/teeth, toileting, grooming, dressing, etc )  - Assess/evaluate cause of self-care deficits   - Assess range of motion  - Assess patient's mobility; develop plan if impaired  - Assess patient's need for assistive devices and provide as appropriate  - Encourage maximum independence but intervene and supervise when necessary  - Involve family in performance of ADLs  - Assess for home care needs following discharge   - Consider OT consult to assist with ADL evaluation and planning for discharge  - Provide patient education as appropriate  Outcome: Progressing     Problem: GENITOURINARY - ADULT  Goal: Maintains or returns to baseline urinary function  Description: INTERVENTIONS:  - Assess urinary function  - Encourage oral fluids to ensure adequate hydration if ordered  - Administer IV fluids as ordered to ensure adequate hydration  - Administer ordered medications as needed  - Offer frequent toileting  - Follow urinary retention protocol if ordered  Outcome: Progressing  Goal: Absence of urinary retention  Description: INTERVENTIONS:  - Assess patient’s ability to void and empty bladder  - Monitor I/O  - Bladder scan as needed  - Discuss with physician/AP medications to alleviate retention as needed  - Discuss catheterization for long term situations as appropriate  Outcome: Progressing  Goal: Urinary catheter remains patent  Description: INTERVENTIONS:  - Assess patency of urinary catheter  - If patient has a chronic dela cruz, consider changing catheter if non-functioning  - Follow guidelines for intermittent irrigation of non-functioning urinary catheter  Outcome: Progressing     Problem: Prexisting or High Potential for Compromised Skin Integrity  Goal: Skin integrity is maintained or improved  Description: INTERVENTIONS:  - Identify patients at risk for skin breakdown  - Assess and monitor skin integrity  - Assess and monitor nutrition and hydration status  - Monitor labs   - Assess for incontinence   - Turn and reposition patient  - Assist with mobility/ambulation  - Relieve pressure over bony prominences  - Avoid friction and shearing  - Provide appropriate hygiene as needed including keeping skin clean and dry  - Evaluate need for skin moisturizer/barrier cream  - Collaborate with interdisciplinary team   - Patient/family teaching  - Consider wound care consult   Outcome: Progressing     Problem: INFECTION - ADULT  Goal: Absence or prevention of progression during hospitalization  Description: INTERVENTIONS:  - Assess and monitor for signs and symptoms of infection  - Monitor lab/diagnostic results  - Monitor all insertion sites, i e  indwelling lines, tubes, and drains  - Monitor endotracheal if appropriate and nasal secretions for changes in amount and color  - [place] appropriate cooling/warming therapies per order  - Administer medications as ordered  - Instruct and encourage patient and family to use good hand hygiene technique  - Identify and instruct in appropriate isolation precautions for identified infection/condition  Outcome: Progressing

## 2023-06-06 NOTE — ASSESSMENT & PLAN NOTE
Lab Results   Component Value Date    HGBA1C 9 2 (H) 06/03/2023       Recent Labs     06/05/23  1053 06/05/23  1602 06/05/23 2049 06/06/23  0717   POCGLU 288* 188* 228* 149*       Blood Sugar Average: Last 72 hrs:  (P) 350 [id number]   · Updated A1c demonstrates poor diabetes control with increase to 9 2%   · Continue home regimen at discharge with planned outpatient follow-up

## 2023-06-06 NOTE — UTILIZATION REVIEW
Notification of Unplanned, Urgent, or   Emergency Inpatient Admission   [address] [address] [address] [facility]  Tax ID: [id number]  NPI: [id number]  [facility]  [facility]  Admission Level of Care: Inpatient  Place of Service Code: 21     Attending Physician Information  Attending Name and NPI#: Pam Briggs [[id number]]  Phone: [phone]     Admission Information  Inpatient Admission Date/Time: 6/3/23  6:57 AM  Discharge Date/Time: No discharge date for patient encounter  Admitting Diagnosis Code/Description:  Palpitations [R00 2]  UTI (urinary tract infection) [N39 0]  Pyelonephritis [N12]  Sepsis ([facility] [address] ) [A41 9]     Utilization Review Contact  Nick Disla Utilization   Phone: [phone]  Fax: [phone]  Email: [email or web address]  Contact for approvals/pending authorizations, clinical reviews, and discharge  Physician Advisory Services Contact  Medical Necessity Denial & [id number] Discussion  Phone: [phone]  Fax: [phone]  Email: [email or web address]

## 2023-06-06 NOTE — ASSESSMENT & PLAN NOTE
Wt Readings from Last 3 Encounters:   06/06/23 76 1 kg (167 lb 12 3 oz)   04/14/23 81 6 kg (179 lb 14 3 oz)   02/27/23 81 6 kg (180 lb)       · Resume home diuretic dose declines

## 2023-06-07 LAB — GLUCOSE SERPL-MCNC: 229 MG/DL (ref 65–140)

## 2023-06-07 NOTE — UTILIZATION REVIEW
NOTIFICATION OF ADMISSION DISCHARGE   This is a Notification of Discharge from [address]  Please be advised that this patient has been discharge from our facility  Below you will find the admission and discharge date and time including the patient’s disposition  UTILIZATION REVIEW CONTACT:  Fabiana Vincent  Utilization   Network Utilization Review Department  Phone: [phone] x carefully listen to the prompts  All voicemails are confidential   Email: [email or web address]     ADMISSION INFORMATION  PRESENTATION DATE: 6/3/2023  1:41 AM  OBERVATION ADMISSION DATE:   INPATIENT ADMISSION DATE: 6/3/23  6:57 AM   DISCHARGE DATE: 6/6/2023  3:21 PM   DISPOSITION:Home with Home Health Care    IMPORTANT INFORMATION:  Send all requests for admission clinical reviews, approved or denied determinations and any other requests to dedicated fax number below belonging to the campus where the patient is receiving treatment   List of dedicated fax numbers:  [address] DENIALS (Administrative/Medical Necessity) [phone]   [address] (Maternity/NICU/Pediatrics) [phone]   [facility] [phone]   [place] [address] [phone]   [facility] [place]  [phone]   [address] [facility] [phone] [address] [facility] [phone]   [facility] [place] [address] [phone]   [facility]  [phone]   [address] [facility] [phone]   [address] [facility] [address] [facility] [phone]

## 2023-06-08 LAB
BACTERIA BLD CULT: NORMAL
BACTERIA BLD CULT: NORMAL

## 2023-06-25 ENCOUNTER — APPOINTMENT (EMERGENCY)
Dept: CT IMAGING | Facility: HOSPITAL | Age: 73
End: 2023-06-25
Payer: MEDICARE

## 2023-06-25 ENCOUNTER — APPOINTMENT (EMERGENCY)
Dept: RADIOLOGY | Facility: HOSPITAL | Age: 73
End: 2023-06-25
Payer: MEDICARE

## 2023-06-25 ENCOUNTER — HOSPITAL ENCOUNTER (INPATIENT)
Facility: HOSPITAL | Age: 73
LOS: 3 days | Discharge: HOME | End: 2023-06-28
Attending: EMERGENCY MEDICINE | Admitting: INTERNAL MEDICINE
Payer: MEDICARE

## 2023-06-25 DIAGNOSIS — N12 PYELONEPHRITIS: Primary | ICD-10-CM

## 2023-06-25 DIAGNOSIS — E87.1 HYPONATREMIA: ICD-10-CM

## 2023-06-25 LAB
2HR DELTA HS TROPONIN: 4 NG/L
ANION GAP SERPL CALCULATED.3IONS-SCNC: 11 MMOL/L
APTT PPP: 26 SECONDS (ref 23–37)
ATRIAL RATE: 127 BPM
BACTERIA UR QL AUTO: ABNORMAL /HPF
BASOPHILS # BLD AUTO: 0.14 THOUSANDS/ÂΜL (ref 0–0.1)
BASOPHILS NFR BLD AUTO: 1 % (ref 0–1)
BILIRUB UR QL STRIP: NEGATIVE
BNP SERPL-MCNC: 33 PG/ML (ref 0–100)
BUDDING YEAST: PRESENT
BUN SERPL-MCNC: 12 MG/DL (ref 5–25)
CALCIUM SERPL-MCNC: 9.7 MG/DL (ref 8.4–10.2)
CARDIAC TROPONIN I PNL SERPL HS: 12 NG/L
CARDIAC TROPONIN I PNL SERPL HS: 8 NG/L
CHLORIDE SERPL-SCNC: 96 MMOL/L (ref 96–108)
CLARITY UR: ABNORMAL
CO2 SERPL-SCNC: 22 MMOL/L (ref 21–32)
COLOR UR: YELLOW
CREAT SERPL-MCNC: 0.91 MG/DL (ref 0.6–1.3)
D DIMER PPP FEU-MCNC: 0.37 UG/ML FEU
EOSINOPHIL # BLD AUTO: 0.41 THOUSAND/ÂΜL (ref 0–0.61)
EOSINOPHIL NFR BLD AUTO: 2 % (ref 0–6)
ERYTHROCYTE [DISTWIDTH] IN BLOOD BY AUTOMATED COUNT: 13.6 % (ref 11.6–15.1)
GFR SERPL CREATININE-BSD FRML MDRD: 83 ML/MIN/1.73SQ M
GLUCOSE SERPL-MCNC: 145 MG/DL (ref 65–140)
GLUCOSE SERPL-MCNC: 213 MG/DL (ref 65–140)
GLUCOSE SERPL-MCNC: 273 MG/DL (ref 65–140)
GLUCOSE SERPL-MCNC: 292 MG/DL (ref 65–140)
GLUCOSE UR STRIP-MCNC: ABNORMAL MG/DL
HCT VFR BLD AUTO: 42.9 % (ref 36.5–49.3)
HGB BLD-MCNC: 14.4 G/DL (ref 12–17)
HGB UR QL STRIP.AUTO: ABNORMAL
HYPHAE YEAST: PRESENT
IMM GRANULOCYTES # BLD AUTO: 0.06 THOUSAND/UL (ref 0–0.2)
IMM GRANULOCYTES NFR BLD AUTO: 0 % (ref 0–2)
INR PPP: 0.9 (ref 0.84–1.19)
KETONES UR STRIP-MCNC: NEGATIVE MG/DL
LACTATE SERPL-SCNC: 1.9 MMOL/L (ref 0.5–2)
LACTATE SERPL-SCNC: 2.5 MMOL/L (ref 0.5–2)
LEUKOCYTE ESTERASE UR QL STRIP: ABNORMAL
LYMPHOCYTES # BLD AUTO: 2.99 THOUSANDS/ÂΜL (ref 0.6–4.47)
LYMPHOCYTES NFR BLD AUTO: 18 % (ref 14–44)
MCH RBC QN AUTO: 29.1 PG (ref 26.8–34.3)
MCHC RBC AUTO-ENTMCNC: 33.6 G/DL (ref 31.4–37.4)
MCV RBC AUTO: 87 FL (ref 82–98)
MONOCYTES # BLD AUTO: 1.48 THOUSAND/ÂΜL (ref 0.17–1.22)
MONOCYTES NFR BLD AUTO: 9 % (ref 4–12)
NEUTROPHILS # BLD AUTO: 11.67 THOUSANDS/ÂΜL (ref 1.85–7.62)
NEUTS SEG NFR BLD AUTO: 70 % (ref 43–75)
NITRITE UR QL STRIP: NEGATIVE
NON-SQ EPI CELLS URNS QL MICRO: ABNORMAL /HPF
NRBC BLD AUTO-RTO: 0 /100 WBCS
P AXIS: 58 DEGREES
PH UR STRIP.AUTO: 5.5 [PH] (ref 4.5–8)
PLATELET # BLD AUTO: 342 THOUSANDS/UL (ref 149–390)
PMV BLD AUTO: 8.1 FL (ref 8.9–12.7)
POTASSIUM SERPL-SCNC: 3.8 MMOL/L (ref 3.5–5.3)
PR INTERVAL: 154 MS
PROT UR STRIP-MCNC: ABNORMAL MG/DL
PROTHROMBIN TIME: 12.1 SECONDS (ref 11.6–14.5)
QRS AXIS: 64 DEGREES
QRSD INTERVAL: 88 MS
QT INTERVAL: 308 MS
QTC INTERVAL: 447 MS
RBC # BLD AUTO: 4.95 MILLION/UL (ref 3.88–5.62)
RBC #/AREA URNS AUTO: ABNORMAL /HPF
SODIUM SERPL-SCNC: 129 MMOL/L (ref 135–147)
SP GR UR STRIP.AUTO: 1.01 (ref 1–1.03)
T WAVE AXIS: 14 DEGREES
UROBILINOGEN UR QL STRIP.AUTO: 0.2 E.U./DL
VENTRICULAR RATE: 127 BPM
WBC # BLD AUTO: 16.75 THOUSAND/UL (ref 4.31–10.16)
WBC #/AREA URNS AUTO: ABNORMAL /HPF

## 2023-06-25 PROCEDURE — 84484 ASSAY OF TROPONIN QUANT: CPT | Performed by: EMERGENCY MEDICINE

## 2023-06-25 PROCEDURE — 83605 ASSAY OF LACTIC ACID: CPT | Performed by: EMERGENCY MEDICINE

## 2023-06-25 PROCEDURE — 85379 FIBRIN DEGRADATION QUANT: CPT | Performed by: EMERGENCY MEDICINE

## 2023-06-25 PROCEDURE — 87040 BLOOD CULTURE FOR BACTERIA: CPT | Performed by: EMERGENCY MEDICINE

## 2023-06-25 PROCEDURE — 85730 THROMBOPLASTIN TIME PARTIAL: CPT | Performed by: EMERGENCY MEDICINE

## 2023-06-25 PROCEDURE — 87147 CULTURE TYPE IMMUNOLOGIC: CPT

## 2023-06-25 PROCEDURE — 85610 PROTHROMBIN TIME: CPT | Performed by: EMERGENCY MEDICINE

## 2023-06-25 PROCEDURE — 85025 COMPLETE CBC W/AUTO DIFF WBC: CPT | Performed by: EMERGENCY MEDICINE

## 2023-06-25 PROCEDURE — 0T2BX0Z CHANGE DRAINAGE DEVICE IN BLADDER, EXTERNAL APPROACH: ICD-10-PCS | Performed by: UROLOGY

## 2023-06-25 PROCEDURE — 96374 THER/PROPH/DIAG INJ IV PUSH: CPT

## 2023-06-25 PROCEDURE — 96375 TX/PRO/DX INJ NEW DRUG ADDON: CPT

## 2023-06-25 PROCEDURE — 87086 URINE CULTURE/COLONY COUNT: CPT

## 2023-06-25 PROCEDURE — 51705 CHANGE OF BLADDER TUBE: CPT | Performed by: NURSE PRACTITIONER

## 2023-06-25 PROCEDURE — 99285 EMERGENCY DEPT VISIT HI MDM: CPT | Performed by: EMERGENCY MEDICINE

## 2023-06-25 PROCEDURE — 93010 ELECTROCARDIOGRAM REPORT: CPT | Performed by: INTERNAL MEDICINE

## 2023-06-25 PROCEDURE — 36415 COLL VENOUS BLD VENIPUNCTURE: CPT | Performed by: EMERGENCY MEDICINE

## 2023-06-25 PROCEDURE — 71260 CT THORAX DX C+: CPT

## 2023-06-25 PROCEDURE — G1004 CDSM NDSC: HCPCS

## 2023-06-25 PROCEDURE — 74177 CT ABD & PELVIS W/CONTRAST: CPT

## 2023-06-25 PROCEDURE — 83880 ASSAY OF NATRIURETIC PEPTIDE: CPT | Performed by: EMERGENCY MEDICINE

## 2023-06-25 PROCEDURE — 87186 SC STD MICRODIL/AGAR DIL: CPT

## 2023-06-25 PROCEDURE — 80048 BASIC METABOLIC PNL TOTAL CA: CPT | Performed by: EMERGENCY MEDICINE

## 2023-06-25 PROCEDURE — 99223 1ST HOSP IP/OBS HIGH 75: CPT | Performed by: INTERNAL MEDICINE

## 2023-06-25 PROCEDURE — 82948 REAGENT STRIP/BLOOD GLUCOSE: CPT

## 2023-06-25 PROCEDURE — 87077 CULTURE AEROBIC IDENTIFY: CPT

## 2023-06-25 PROCEDURE — 81001 URINALYSIS AUTO W/SCOPE: CPT

## 2023-06-25 PROCEDURE — 99285 EMERGENCY DEPT VISIT HI MDM: CPT

## 2023-06-25 PROCEDURE — 93005 ELECTROCARDIOGRAM TRACING: CPT

## 2023-06-25 PROCEDURE — 99222 1ST HOSP IP/OBS MODERATE 55: CPT | Performed by: UROLOGY

## 2023-06-25 PROCEDURE — 71046 X-RAY EXAM CHEST 2 VIEWS: CPT

## 2023-06-25 PROCEDURE — 83605 ASSAY OF LACTIC ACID: CPT | Performed by: INTERNAL MEDICINE

## 2023-06-25 PROCEDURE — 87106 FUNGI IDENTIFICATION YEAST: CPT

## 2023-06-25 RX ORDER — PANTOPRAZOLE SODIUM 40 MG/1
40 TABLET, DELAYED RELEASE ORAL
Status: DISCONTINUED | OUTPATIENT
Start: 2023-06-25 | End: 2023-06-28 | Stop reason: HOSPADM

## 2023-06-25 RX ORDER — GABAPENTIN 300 MG/1
600 CAPSULE ORAL
Status: DISCONTINUED | OUTPATIENT
Start: 2023-06-25 | End: 2023-06-28 | Stop reason: HOSPADM

## 2023-06-25 RX ORDER — MAGNESIUM HYDROXIDE/ALUMINUM HYDROXICE/SIMETHICONE 120; 1200; 1200 MG/30ML; MG/30ML; MG/30ML
30 SUSPENSION ORAL EVERY 4 HOURS PRN
Status: DISCONTINUED | OUTPATIENT
Start: 2023-06-25 | End: 2023-06-28 | Stop reason: HOSPADM

## 2023-06-25 RX ORDER — LATANOPROST 50 UG/ML
1 SOLUTION/ DROPS OPHTHALMIC
Status: DISCONTINUED | OUTPATIENT
Start: 2023-06-25 | End: 2023-06-28 | Stop reason: HOSPADM

## 2023-06-25 RX ORDER — ALPRAZOLAM 0.25 MG/1
0.25 TABLET ORAL 2 TIMES DAILY PRN
Status: DISCONTINUED | OUTPATIENT
Start: 2023-06-25 | End: 2023-06-28 | Stop reason: HOSPADM

## 2023-06-25 RX ORDER — GABAPENTIN 300 MG/1
300 CAPSULE ORAL 2 TIMES DAILY
Status: DISCONTINUED | OUTPATIENT
Start: 2023-06-25 | End: 2023-06-28 | Stop reason: HOSPADM

## 2023-06-25 RX ORDER — CLOPIDOGREL BISULFATE 75 MG/1
75 TABLET ORAL DAILY
Status: DISCONTINUED | OUTPATIENT
Start: 2023-06-25 | End: 2023-06-28 | Stop reason: HOSPADM

## 2023-06-25 RX ORDER — ACETAMINOPHEN 325 MG/1
650 TABLET ORAL EVERY 6 HOURS PRN
Status: DISCONTINUED | OUTPATIENT
Start: 2023-06-25 | End: 2023-06-28 | Stop reason: HOSPADM

## 2023-06-25 RX ORDER — POLYETHYLENE GLYCOL 3350 17 G/17G
17 POWDER, FOR SOLUTION ORAL DAILY PRN
Status: DISCONTINUED | OUTPATIENT
Start: 2023-06-25 | End: 2023-06-28 | Stop reason: HOSPADM

## 2023-06-25 RX ORDER — CEFTRIAXONE 1 G/50ML
1000 INJECTION, SOLUTION INTRAVENOUS ONCE
Status: COMPLETED | OUTPATIENT
Start: 2023-06-25 | End: 2023-06-25

## 2023-06-25 RX ORDER — HYDRALAZINE HYDROCHLORIDE 20 MG/ML
10 INJECTION INTRAMUSCULAR; INTRAVENOUS EVERY 6 HOURS PRN
Status: DISCONTINUED | OUTPATIENT
Start: 2023-06-25 | End: 2023-06-28 | Stop reason: HOSPADM

## 2023-06-25 RX ORDER — PROPRANOLOL HCL 60 MG
60 CAPSULE, EXTENDED RELEASE 24HR ORAL DAILY
Status: DISCONTINUED | OUTPATIENT
Start: 2023-06-25 | End: 2023-06-28 | Stop reason: HOSPADM

## 2023-06-25 RX ORDER — LANOLIN ALCOHOL/MO/W.PET/CERES
3 CREAM (GRAM) TOPICAL
Status: DISCONTINUED | OUTPATIENT
Start: 2023-06-25 | End: 2023-06-28 | Stop reason: HOSPADM

## 2023-06-25 RX ORDER — ONDANSETRON 2 MG/ML
4 INJECTION INTRAMUSCULAR; INTRAVENOUS ONCE
Status: COMPLETED | OUTPATIENT
Start: 2023-06-25 | End: 2023-06-25

## 2023-06-25 RX ORDER — ALBUTEROL SULFATE 90 UG/1
2 AEROSOL, METERED RESPIRATORY (INHALATION) EVERY 6 HOURS PRN
Status: DISCONTINUED | OUTPATIENT
Start: 2023-06-25 | End: 2023-06-28 | Stop reason: HOSPADM

## 2023-06-25 RX ORDER — INSULIN LISPRO 100 [IU]/ML
2-12 INJECTION, SOLUTION INTRAVENOUS; SUBCUTANEOUS
Status: DISCONTINUED | OUTPATIENT
Start: 2023-06-25 | End: 2023-06-28 | Stop reason: HOSPADM

## 2023-06-25 RX ORDER — NYSTATIN 100000 [USP'U]/G
POWDER TOPICAL 2 TIMES DAILY
Status: DISCONTINUED | OUTPATIENT
Start: 2023-06-25 | End: 2023-06-28 | Stop reason: HOSPADM

## 2023-06-25 RX ORDER — TRAMADOL HYDROCHLORIDE 50 MG/1
50 TABLET ORAL EVERY 6 HOURS PRN
Status: DISCONTINUED | OUTPATIENT
Start: 2023-06-25 | End: 2023-06-28 | Stop reason: HOSPADM

## 2023-06-25 RX ORDER — ACETAMINOPHEN 325 MG/1
975 TABLET ORAL ONCE
Status: COMPLETED | OUTPATIENT
Start: 2023-06-25 | End: 2023-06-25

## 2023-06-25 RX ORDER — ONDANSETRON 2 MG/ML
4 INJECTION INTRAMUSCULAR; INTRAVENOUS EVERY 8 HOURS PRN
Status: DISCONTINUED | OUTPATIENT
Start: 2023-06-25 | End: 2023-06-28 | Stop reason: HOSPADM

## 2023-06-25 RX ORDER — SODIUM CHLORIDE AND POTASSIUM CHLORIDE 150; 900 MG/100ML; MG/100ML
100 INJECTION, SOLUTION INTRAVENOUS CONTINUOUS
Status: DISPENSED | OUTPATIENT
Start: 2023-06-25 | End: 2023-06-26

## 2023-06-25 RX ORDER — MAGNESIUM HYDROXIDE/ALUMINUM HYDROXICE/SIMETHICONE 120; 1200; 1200 MG/30ML; MG/30ML; MG/30ML
30 SUSPENSION ORAL EVERY 4 HOURS PRN
Status: DISCONTINUED | OUTPATIENT
Start: 2023-06-25 | End: 2023-06-25

## 2023-06-25 RX ORDER — ENOXAPARIN SODIUM 100 MG/ML
40 INJECTION SUBCUTANEOUS DAILY
Status: DISCONTINUED | OUTPATIENT
Start: 2023-06-25 | End: 2023-06-28 | Stop reason: HOSPADM

## 2023-06-25 RX ORDER — CEFTRIAXONE 1 G/50ML
1000 INJECTION, SOLUTION INTRAVENOUS EVERY 24 HOURS
Status: DISCONTINUED | OUTPATIENT
Start: 2023-06-26 | End: 2023-06-28

## 2023-06-25 RX ORDER — AMOXICILLIN 250 MG
2 CAPSULE ORAL
Status: DISCONTINUED | OUTPATIENT
Start: 2023-06-25 | End: 2023-06-28 | Stop reason: HOSPADM

## 2023-06-25 RX ORDER — SERTRALINE HYDROCHLORIDE 25 MG/1
25 TABLET, FILM COATED ORAL
Status: DISCONTINUED | OUTPATIENT
Start: 2023-06-25 | End: 2023-06-28 | Stop reason: HOSPADM

## 2023-06-25 RX ADMIN — GABAPENTIN 300 MG: 300 CAPSULE ORAL at 17:03

## 2023-06-25 RX ADMIN — GABAPENTIN 600 MG: 300 CAPSULE ORAL at 21:52

## 2023-06-25 RX ADMIN — SODIUM CHLORIDE AND POTASSIUM CHLORIDE 100 ML/HR: .9; .15 SOLUTION INTRAVENOUS at 20:26

## 2023-06-25 RX ADMIN — ONDANSETRON 4 MG: 2 INJECTION INTRAMUSCULAR; INTRAVENOUS at 04:29

## 2023-06-25 RX ADMIN — INSULIN LISPRO 4 UNITS: 100 INJECTION, SOLUTION INTRAVENOUS; SUBCUTANEOUS at 21:53

## 2023-06-25 RX ADMIN — PANTOPRAZOLE SODIUM 40 MG: 40 TABLET, DELAYED RELEASE ORAL at 17:03

## 2023-06-25 RX ADMIN — CLOPIDOGREL BISULFATE 75 MG: 75 TABLET ORAL at 10:51

## 2023-06-25 RX ADMIN — NYSTATIN: 100000 POWDER TOPICAL at 17:03

## 2023-06-25 RX ADMIN — CEFTRIAXONE 1000 MG: 1 INJECTION, SOLUTION INTRAVENOUS at 07:41

## 2023-06-25 RX ADMIN — ACETAMINOPHEN 325MG 975 MG: 325 TABLET ORAL at 07:47

## 2023-06-25 RX ADMIN — SENNOSIDES AND DOCUSATE SODIUM 2 TABLET: 50; 8.6 TABLET ORAL at 21:53

## 2023-06-25 RX ADMIN — GABAPENTIN 300 MG: 300 CAPSULE ORAL at 10:51

## 2023-06-25 RX ADMIN — ONDANSETRON 4 MG: 2 INJECTION INTRAMUSCULAR; INTRAVENOUS at 10:45

## 2023-06-25 RX ADMIN — SERTRALINE HYDROCHLORIDE 25 MG: 25 TABLET ORAL at 21:53

## 2023-06-25 RX ADMIN — IOHEXOL 100 ML: 350 INJECTION, SOLUTION INTRAVENOUS at 06:37

## 2023-06-25 RX ADMIN — INSULIN LISPRO 6 UNITS: 100 INJECTION, SOLUTION INTRAVENOUS; SUBCUTANEOUS at 12:07

## 2023-06-25 RX ADMIN — SODIUM CHLORIDE 1000 ML: 0.9 INJECTION, SOLUTION INTRAVENOUS at 07:43

## 2023-06-25 RX ADMIN — LATANOPROST 1 DROP: 50 SOLUTION OPHTHALMIC at 21:53

## 2023-06-25 RX ADMIN — ENOXAPARIN SODIUM 40 MG: 40 INJECTION SUBCUTANEOUS at 10:51

## 2023-06-25 RX ADMIN — TRAMADOL HYDROCHLORIDE 50 MG: 50 TABLET, COATED ORAL at 11:35

## 2023-06-25 RX ADMIN — PROPRANOLOL HYDROCHLORIDE 60 MG: 60 CAPSULE, EXTENDED RELEASE ORAL at 11:37

## 2023-06-25 RX ADMIN — NYSTATIN: 100000 POWDER TOPICAL at 11:34

## 2023-06-25 RX ADMIN — SODIUM CHLORIDE AND POTASSIUM CHLORIDE 100 ML/HR: .9; .15 SOLUTION INTRAVENOUS at 10:26

## 2023-06-25 NOTE — ED PROVIDER NOTES
History  Chief Complaint   Patient presents with   • Shortness of Breath     States SOB starting at 0000  Denies CP, headache, lightheadedness, dizziness  65-year-old male with history of diabetes, multiple sclerosis, hypertension, hyperlipidemia, CHF who presents with shortness of breath  At around midnight, patient started to experience shortness of breath associated with cough  No known sick contacts  No known weight gain or lower extremity edema  Denies any pain  Prior to Admission Medications   Prescriptions Last Dose Informant Patient Reported? Taking?    ALPRAZolam (XANAX) 0 25 mg tablet  Self Yes No   Sig: Take 0 25 mg by mouth 2 (two) times a day as needed for anxiety or sleep    Dulaglutide (Trulicity) 2 18 YS/8 6EJ SOPN  Self Yes No   Sig: Inject 0 75 mg under the skin once a week   Ergocalciferol (VITAMIN D2 PO)  Self Yes No   Sig: Take 50,000 Units by mouth once a week   albuterol (2 5 mg/3 mL) 0 083 % nebulizer solution  Self Yes No   Sig: Take 2 5 mg by nebulization every 6 (six) hours as needed for wheezing or shortness of breath   albuterol (PROVENTIL HFA,VENTOLIN HFA) 90 mcg/act inhaler  Self Yes No   Sig: Inhale 2 puffs every 6 (six) hours as needed for wheezing   amLODIPine-atorvastatin (CADUET) 10-80 MG per tablet  Self Yes No   Sig: Take 1 tablet by mouth daily   clopidogrel (PLAVIX) 75 mg tablet  Self No No   Sig: Take 1 tablet (75 mg total) by mouth daily   furosemide (LASIX) 40 mg tablet  Self Yes No   Sig: Take 40 mg by mouth daily   gabapentin (NEURONTIN) 300 mg capsule  Self No No   Sig: Take 1 capsule (300 mg total) by mouth 2 (two) times a day   gabapentin (NEURONTIN) 300 mg capsule   No No   Sig: Take 2 capsules (600 mg total) by mouth daily at bedtime   latanoprost (XALATAN) 0 005 % ophthalmic solution  Self Yes No   Sig: Administer 1 drop to both eyes daily at bedtime   melatonin 3 mg  Self Yes No   Sig: Take 3 mg by mouth daily at bedtime as needed   pantoprazole (PROTONIX) 40 mg tablet   No No   Sig: TAKE 1 TABLET TWICE DAILY 30 MINUTES BEFORE BREAKFAST AND DINNER    polyethylene glycol (MIRALAX) 17 g packet   Yes No   Sig: Take 17 g by mouth daily as needed   potassium chloride (Klor-Con) 10 mEq tablet   Yes No   Sig: Take 10 mEq by mouth 2 (two) times a day   propranolol (INDERAL LA) 60 mg 24 hr capsule   Yes No   Sig: Take 60 mg by mouth daily   senna-docusate sodium (SENOKOT S) 8 6-50 mg per tablet   Yes No   Sig: Take 2 tablets by mouth daily at bedtime   sertraline (ZOLOFT) 25 mg tablet   Yes No   Sig: Take 25 mg by mouth daily at bedtime      Facility-Administered Medications: None       Past Medical History:   Diagnosis Date   • Acute laryngitis    • Acute nonsuppurative otitis media, unspecified laterality    • Arm weakness    • Arthritis    • 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HCC)    • Bladder infection    • Bronchitis    • Constipation    • Cough    • Diabetes mellitus (HCC)    • Dizziness    • Dysfunction of eustachian tube    • Erectile dysfunction of non-organic origin    • Fatigue    • Glaucoma    • Hiatal hernia    • Hypertension    • Imbalance    • Leg muscle spasm    • MS (multiple sclerosis) (HCC)    • Nephrolithiasis    • Neurogenic bladder    • No natural teeth    • Sinus pain    • Spinal stenosis    • Strain of thoracic region    • Stroke [facility])    • Suprapubic catheter ([facility] [address] )        Past Surgical History:   Procedure Laterality Date   • APPENDECTOMY     • BRAIN SURGERY      Coil placed in aneurysm   • CYSTOSCOPY     • CYSTOSCOPY     • CYSTOSCOPY  06/11/2018   • CYSTOSCOPY  01/15/2021   • EYE SURGERY      transscleral cyclophotocoagulation noncontact YAG laser   • MYRINGOTOMY      with ventilation tube insertion   • CT LITHOLAPAXY SMPL/SM <2 5 CM N/A 5/7/2019    Procedure: CYSTOSCOPY, holmium laser litholapaxy of bladder stones, EXCHANGE OF SP TUBE;  Surgeon: Rogelio Dunne MD;  Location: [facility];  Service: Urology   • SUPRAPUBIC CATHETER INSERTION Family History   Problem Relation Age of Onset   • Heart attack Mother    • Stroke Mother    • Heart attack Father    • Anuerysm Father         In Stomach      I have reviewed and agree with the history as documented  E-Cigarette/Vaping   • E-Cigarette Use Never User      E-Cigarette/Vaping Substances   • Nicotine No    • THC No    • CBD No    • Flavoring No    • Other No    • Unknown No      Social History     Tobacco Use   • Smoking status: Former     Packs/day: 0 50     Years: 31 00     Total pack years: 15 50     Types: Cigarettes     Start date:      Quit date:      Years since quittin 4   • Smokeless tobacco: Never   Vaping Use   • Vaping Use: Never used   Substance Use Topics   • Alcohol use: Not Currently   • Drug use: No       Review of Systems   Constitutional: Negative for chills, fatigue and fever  HENT: Negative for rhinorrhea, sore throat and trouble swallowing  Eyes: Negative for photophobia and visual disturbance  Respiratory: Positive for cough and shortness of breath  Negative for chest tightness  Cardiovascular: Negative for chest pain, palpitations and leg swelling  Gastrointestinal: Negative for abdominal pain, blood in stool, diarrhea, nausea and vomiting  Endocrine: Negative for polyuria  Genitourinary: Negative for dysuria, flank pain and hematuria  Musculoskeletal: Negative for back pain and neck pain  Skin: Negative for color change and rash  Allergic/Immunologic: Negative for immunocompromised state  Neurological: Negative for dizziness, weakness, light-headedness, numbness and headaches  All other systems reviewed and are negative  Physical Exam  Physical Exam  Vitals and nursing note reviewed  Constitutional:       General: He is not in acute distress  Appearance: He is well-developed  Comments: Chronically ill-appearing  HENT:      Head: Normocephalic and atraumatic  Mouth/Throat:      Lips: Pink        Mouth: Mucous membranes are moist    Eyes:      General: Lids are normal       Extraocular Movements: Extraocular movements intact  Conjunctiva/sclera: Conjunctivae normal       Pupils: Pupils are equal, round, and reactive to light  Cardiovascular:      Rate and Rhythm: Regular rhythm  Tachycardia present  Heart sounds: Normal heart sounds  No murmur heard  Pulmonary:      Effort: Pulmonary effort is normal       Breath sounds: Normal breath sounds  Abdominal:      General: There is no distension  Palpations: Abdomen is soft  Tenderness: There is no abdominal tenderness  There is no guarding or rebound  Comments: Suprapubic catheter in place  Musculoskeletal:         General: No swelling  Cervical back: Full passive range of motion without pain, normal range of motion and neck supple  Comments: No pitting edema bilateral lower extremities  Skin:     General: Skin is warm  Capillary Refill: Capillary refill takes less than 2 seconds  Findings: No rash  Neurological:      General: No focal deficit present  Mental Status: He is alert     Psychiatric:         Mood and Affect: Mood normal          Speech: Speech normal          Behavior: Behavior normal          Vital Signs  ED Triage Vitals [06/25/23 0259]   Temperature Pulse Respirations Blood Pressure SpO2   98 3 °F (36 8 °C) (!) 126 22 160/78 99 %      Temp Source Heart Rate Source Patient Position - Orthostatic VS BP Location FiO2 (%)   Oral Monitor Lying Right arm --      Pain Score       No Pain           Vitals:    06/25/23 0845 06/25/23 1135 06/25/23 1135 06/25/23 1525   BP: 113/66 167/77 167/77 128/67   Pulse: (!) 118  (!) 113 79   Patient Position - Orthostatic VS: Lying   Lying         Visual Acuity      ED Medications  Medications   clopidogrel (PLAVIX) tablet 75 mg (75 mg Oral Given 6/25/23 1051)   gabapentin (NEURONTIN) capsule 300 mg (300 mg Oral Given 6/25/23 1703)   gabapentin (NEURONTIN) capsule 600 mg (has no administration in time range)   latanoprost (XALATAN) 0 005 % ophthalmic solution 1 drop (has no administration in time range)   senna-docusate sodium (SENOKOT S) 8 6-50 mg per tablet 2 tablet (has no administration in time range)   sertraline (ZOLOFT) tablet 25 mg (has no administration in time range)   propranolol (INDERAL LA) 24 hr capsule 60 mg (60 mg Oral Given 6/25/23 1137)   albuterol (PROVENTIL HFA,VENTOLIN HFA) inhaler 2 puff (has no administration in time range)   ALPRAZolam (XANAX) tablet 0 25 mg (has no administration in time range)   melatonin tablet 3 mg (has no administration in time range)   polyethylene glycol (MIRALAX) packet 17 g (has no administration in time range)   pantoprazole (PROTONIX) EC tablet 40 mg (40 mg Oral Given 6/25/23 1703)   enoxaparin (LOVENOX) subcutaneous injection 40 mg (40 mg Subcutaneous Given 6/25/23 1051)   cefTRIAXone (ROCEPHIN) IVPB (premix in dextrose) 1,000 mg 50 mL (has no administration in time range)   sodium chloride 0 9 % with KCl 20 mEq/L infusion (premix) (100 mL/hr Intravenous New Bag 6/25/23 2026)   insulin lispro (HumaLOG) 100 units/mL subcutaneous injection 2-12 Units ( Subcutaneous Not Given 6/25/23 1644)   insulin lispro (HumaLOG) 100 units/mL subcutaneous injection 2-12 Units (has no administration in time range)   hydrALAZINE (APRESOLINE) injection 10 mg (has no administration in time range)   aluminum-magnesium hydroxide-simethicone (MYLANTA) oral suspension 30 mL (has no administration in time range)   acetaminophen (TYLENOL) tablet 650 mg (has no administration in time range)   traMADol (ULTRAM) tablet 50 mg (50 mg Oral Given 6/25/23 1135)   ondansetron (ZOFRAN) injection 4 mg (4 mg Intravenous Given 6/25/23 1045)   nystatin (MYCOSTATIN) powder ( Topical Given 6/25/23 1703)   ondansetron (ZOFRAN) injection 4 mg (4 mg Intravenous Given 6/25/23 0429)   iohexol (OMNIPAQUE) 350 MG/ML injection (SINGLE-DOSE) 100 mL (100 mL Intravenous Given 6/25/23 2188)   cefTRIAXone (ROCEPHIN) IVPB (premix in dextrose) 1,000 mg 50 mL (1,000 mg Intravenous New Bag 6/25/23 0741)   acetaminophen (TYLENOL) tablet 975 mg (975 mg Oral Given 6/25/23 0747)   sodium chloride 0 9 % bolus 1,000 mL (1,000 mL Intravenous New Bag 6/25/23 0743)       Diagnostic Studies  Results Reviewed     Procedure Component Value Units Date/Time    Blood culture #1 [[id number]] Collected: 06/25/23 0738    Lab Status: Preliminary result Specimen: Blood from Arm, Left Updated: 06/25/23 1301     Blood Culture Received in Microbiology Lab  Culture in Progress  Blood culture #2 [[id number]] Collected: 06/25/23 0738    Lab Status: Preliminary result Specimen: Blood from Arm, Right Updated: 06/25/23 1301     Blood Culture Received in Microbiology Lab  Culture in Progress  Lactic acid, plasma (w/reflex if result > 2 0) [[id number]]  (Abnormal) Collected: 06/25/23 0738    Lab Status: Final result Specimen: Blood from Arm, Right Updated: 06/25/23 0826     LACTIC ACID 2 5 mmol/L     Narrative:      Result may be elevated if tourniquet was used during collection  Urine Microscopic [[id number]]  (Abnormal) Collected: 06/25/23 0642    Lab Status: Final result Specimen: Urine, Suprapubic catheter Updated: 06/25/23 0720     RBC, UA Innumerable /hpf      WBC, UA Innumerable /hpf      Epithelial Cells Occasional /hpf      Bacteria, UA Occasional /hpf      Budding Yeast Present     Hyphae Yeast Present    Urine culture [[id number]] Collected: 06/25/23 0642    Lab Status:  In process Specimen: Urine, Suprapubic catheter Updated: 06/25/23 0720    Urine Macroscopic, POC [[id number]]  (Abnormal) Collected: 06/25/23 0642    Lab Status: Final result Specimen: Urine Updated: 06/25/23 0643     Color, UA Yellow     Clarity, UA Cloudy     pH, UA 5 5     Leukocytes, UA Small     Nitrite, UA Negative     Protein, UA Trace mg/dl      Glucose, UA >=1000 (1%) mg/dl      Ketones, UA Negative mg/dl      Urobilinogen, UA 0 2 E U /dl Bilirubin, UA Negative     Occult Blood, UA Moderate     Specific [place], UA 1 010    Narrative:      CLINITEK RESULT    HS Troponin I 2hr [[id number]]  (Normal) Collected: 06/25/23 0538    Lab Status: Final result Specimen: Blood from Arm, Left Updated: 06/25/23 0607     hs TnI 2hr 12 ng/L      Delta 2hr hsTnI 4 ng/L     HS Troponin 0hr (reflex protocol) [[id number]]  (Normal) Collected: 06/25/23 0332    Lab Status: Final result Specimen: Blood from Arm, Left Updated: 06/25/23 0405     hs TnI 0hr 8 ng/L     B-Type Natriuretic Peptide(BNP) [[id number]]  (Normal) Collected: 06/25/23 0332    Lab Status: Final result Specimen: Blood from Arm, Left Updated: 06/25/23 0403     BNP 33 pg/mL     Basic metabolic panel [[id number]]  (Abnormal) Collected: 06/25/23 0332    Lab Status: Final result Specimen: Blood from Arm, Left Updated: 06/25/23 0357     Sodium 129 mmol/L      Potassium 3 8 mmol/L      Chloride 96 mmol/L      CO2 22 mmol/L      ANION GAP 11 mmol/L      BUN 12 mg/dL      Creatinine 0 91 mg/dL      Glucose 292 mg/dL      Calcium 9 7 mg/dL      eGFR 83 ml/min/1 73sq m     Narrative:      [place] guidelines for Chronic Kidney Disease (CKD):   •  Stage 1 with normal or high GFR (GFR > 90 mL/min/1 73 square meters)  •  Stage 2 Mild CKD (GFR = 60-89 mL/min/1 73 square meters)  •  Stage 3A Moderate CKD (GFR = 45-59 mL/min/1 73 square meters)  •  Stage 3B Moderate CKD (GFR = 30-44 mL/min/1 73 square meters)  •  Stage 4 Severe CKD (GFR = 15-29 mL/min/1 73 square meters)  •  Stage 5 End Stage CKD (GFR <15 mL/min/1 73 square meters)  Note: GFR calculation is accurate only with a steady state creatinine    D-Dimer [[id number]]  (Normal) Collected: 06/25/23 0332    Lab Status: Final result Specimen: Blood from Arm, Left Updated: 06/25/23 0355     D-Dimer, Quant 0 37 ug/ml FEU     Narrative:       In the evaluation for possible pulmonary embolism, in the appropriate (Well's Score of 4 or less) patient, the age adjusted d-dimer cutoff for this patient can be calculated as:    Age x 0 01 (in ug/mL) for Age-adjusted D-dimer exclusion threshold for a patient over 50 years  Protime-INR [[id number]]  (Normal) Collected: 06/25/23 0332    Lab Status: Final result Specimen: Blood from Arm, Left Updated: 06/25/23 0353     Protime 12 1 seconds      INR 0 90    APTT [[id number]]  (Normal) Collected: 06/25/23 0332    Lab Status: Final result Specimen: Blood from Arm, Left Updated: 06/25/23 0353     PTT 26 seconds     CBC and differential [[id number]]  (Abnormal) Collected: 06/25/23 0332    Lab Status: Final result Specimen: Blood from Arm, Left Updated: 06/25/23 0339     WBC 16 75 Thousand/uL      RBC 4 95 Million/uL      Hemoglobin 14 4 g/dL      Hematocrit 42 9 %      MCV 87 fL      MCH 29 1 pg      MCHC 33 6 g/dL      RDW 13 6 %      MPV 8 1 fL      Platelets 415 Thousands/uL      nRBC 0 /100 WBCs      Neutrophils Relative 70 %      Immat GRANS % 0 %      Lymphocytes Relative 18 %      Monocytes Relative 9 %      Eosinophils Relative 2 %      Basophils Relative 1 %      Neutrophils Absolute 11 67 Thousands/µL      Immature Grans Absolute 0 06 Thousand/uL      Lymphocytes Absolute 2 99 Thousands/µL      Monocytes Absolute 1 48 Thousand/µL      Eosinophils Absolute 0 41 Thousand/µL      Basophils Absolute 0 14 Thousands/µL                  CT chest abdomen pelvis w contrast   Final Result by Jaylen Bonner DO (06/25 7216)      Suprapubic catheter terminates within the bladder  Moderate irregular bladder wall thickening with surrounding inflammatory changes, highly suspicious for cystitis  Scattered areas of heterogeneous enhancement in the upper and midportion of the left kidney suspicious for left-sided pyelonephritis        Large amount of stool in the rectum measuring approximately 11 6 x 8 0 x 7 1 cm in size with associated rectal wall thickening and perirectal inflammatory changes, suspicious for fecal impaction and associated stercoral colitis  Mild scattered pulmonary parenchymal and paraseptal emphysematous changes, moderate coronary atherosclerosis, left renal cyst, small hiatal hernia, and other findings as above  Above findings communicated to Dr Thomas Hurst on 6/25/2023 7:06 AM             Workstation performed: [id number]         XR chest 2 views   ED Interpretation by Brenda Palumbo MD (06/25 7574)   No acute cardiopulmonary disease as interpreted by myself  Final Result by Tammie Goldsmith MD (06/25 1129)      No acute cardiopulmonary disease  Workstation performed: [id number]                    Procedures  Procedures         ED Course  ED Course as of 06/25/23 2105   [place] Jun 25, 2023   0542 Pulse(!): 120  Persistent tachycardia with relatively unremarkable work up except elevated wbc  Will search for source of infection  CXR unremarkable but will add ct chest/abd/pelvis for pneumonia/pyelo  Has recurrent UTI/pyelo  Initial Sepsis Screening     Row Name 06/25/23 6011                Is the patient's history suggestive of a new or worsening infection? Yes (Proceed)  -[address]        Suspected source of infection urinary tract infection  -KH        Indicate SIRS criteria Tachycardia > 90 bpm;Leukocytosis (WBC > 20883 IJL) OR Leukopenia (WBC <4000 IJL) OR Bandemia (WBC >10% bands)  -KH        Are two or more of the above signs & symptoms of infection both present and new to the patient? Yes (Proceed)  -KH        Assess for evidence of organ dysfunction: Are any of the below criteria present within 6 hours of suspected infection and SIRS criteria that are NOT considered to be chronic conditions? --              User Key  (r) = Recorded By, (t) = Taken By, (c) = Cosigned By    [address] Name Provider Type    [address] [place] [address],  Physician                SBIRT 20yo+    Flowsheet Row Most Recent Value   Initial Alcohol Screen: US AUDIT-C     1   How often do you have a drink containing alcohol? 0 Filed at: 06/25/2023 0334   2  How many drinks containing alcohol do you have on a typical day you are drinking? 0 Filed at: 06/25/2023 0334   3a  Male UNDER 65: How often do you have five or more drinks on one occasion? 0 Filed at: 06/25/2023 0334   3b  FEMALE Any Age, or MALE 65+: How often do you have 4 or more drinks on one occassion? 0 Filed at: 06/25/2023 0334   Audit-C Score 0 Filed at: 06/25/2023 7035   ALPESH: How many times in the past year have you    Used an illegal drug or used a prescription medication for non-medical reasons? Never Filed at: 06/25/2023 0187                    Medical Decision Making  - Given patient's concerns, will do a cardiac workup  - Will do an EKG for arrythmia, strain; troponin for same as per protocol for evaluation of ACS  - CBC for anemia; CMP for kidney function and electrolytes  - CXR (pneumonia, ptx) vs  CTA (for PE) pending dimer   - BNP to evaluate for strain   -  Patient low risk by [place]  Check dimer to evaluate for PE         - Disposition per workup       Past Medical History:  No date: Acute laryngitis  No date: Acute nonsuppurative otitis media, unspecified laterality  No date: Arm weakness  No date: Arthritis  No date: 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HCC)  No date: Bladder infection  No date: Bronchitis  No date: Constipation  No date: Cough  No date: Diabetes mellitus (HCC)  No date: Dizziness  No date: Dysfunction of eustachian tube  No date: Erectile dysfunction of non-organic origin  No date: Fatigue  No date: Glaucoma  No date: Hiatal hernia  No date: Hypertension  No date: Imbalance  No date: Leg muscle spasm  No date: MS (multiple sclerosis) ([facility] [address] )  No date: Nephrolithiasis  No date: Neurogenic bladder  No date: No natural teeth  No date: Sinus pain  No date: Spinal stenosis  No date: Strain of thoracic region  No date: Stroke [facility])  No date[facility]    Hyponatremia: chronic illness or injury  Pyelonephritis: complicated acute illness or injury with systemic symptoms that poses a threat to life or bodily functions  Amount and/or Complexity of Data Reviewed  Labs: ordered  Radiology: ordered and independent interpretation performed  ECG/medicine tests: ordered and independent interpretation performed  Risk  OTC drugs  Prescription drug management  Decision regarding hospitalization  Disposition  Final diagnoses:   Pyelonephritis   Hyponatremia     Time reflects when diagnosis was documented in both MDM as applicable and the Disposition within this note     Time User Action Codes Description Comment    6/25/2023  7:46 AM Joe BROWN Add [N12] Pyelonephritis     6/25/2023  7:46 AM Joe BROWN Add [E87 1] Hyponatremia       ED Disposition     ED Disposition   Admit    Condition   Stable    Date/Time   Sun Jun 25, 2023  7:46 AM    Comment   Case was discussed with Dr Naomi Combs and the patient's admission status was agreed to be Admission Status: inpatient status to the service of Dr Naomi Combs   Follow-up Information    None         Current Discharge Medication List      CONTINUE these medications which have NOT CHANGED    Details   albuterol (2 5 mg/3 mL) 0 083 % nebulizer solution Take 2 5 mg by nebulization every 6 (six) hours as needed for wheezing or shortness of breath      albuterol (PROVENTIL HFA,VENTOLIN HFA) 90 mcg/act inhaler Inhale 2 puffs every 6 (six) hours as needed for wheezing      ALPRAZolam (XANAX) 0 25 mg tablet Take 0 25 mg by mouth 2 (two) times a day as needed for anxiety or sleep       amLODIPine-atorvastatin (CADUET) 10-80 MG per tablet Take 1 tablet by mouth daily      clopidogrel (PLAVIX) 75 mg tablet Take 1 tablet (75 mg total) by mouth daily  Refills: 0    Associated Diagnoses: Chronic suprapubic catheter ([address] );  Neurogenic bladder; Cellulitis      Dulaglutide (Trulicity) 7 78 BG/3 6DY SOPN Inject 0 75 mg under the skin once a week Ergocalciferol (VITAMIN D2 PO) Take 50,000 Units by mouth once a week      furosemide (LASIX) 40 mg tablet Take 40 mg by mouth daily      !! gabapentin (NEURONTIN) 300 mg capsule Take 1 capsule (300 mg total) by mouth 2 (two) times a day  Qty: 60 capsule, Refills: 0    Associated Diagnoses: Multiple sclerosis ([address] )      ! ! gabapentin (NEURONTIN) 300 mg capsule Take 2 capsules (600 mg total) by mouth daily at bedtime  Qty: 30 capsule, Refills: 0    Associated Diagnoses: Multiple sclerosis (HCC)      latanoprost (XALATAN) 0 005 % ophthalmic solution Administer 1 drop to both eyes daily at bedtime      melatonin 3 mg Take 3 mg by mouth daily at bedtime as needed      pantoprazole (PROTONIX) 40 mg tablet TAKE 1 TABLET TWICE DAILY 30 MINUTES BEFORE BREAKFAST AND DINNER  Qty: 180 tablet, Refills: 1    Associated Diagnoses: Gastroesophageal reflux disease without esophagitis      polyethylene glycol (MIRALAX) 17 g packet Take 17 g by mouth daily as needed      potassium chloride (Klor-Con) 10 mEq tablet Take 10 mEq by mouth 2 (two) times a day      propranolol (INDERAL LA) 60 mg 24 hr capsule Take 60 mg by mouth daily      senna-docusate sodium (SENOKOT S) 8 6-50 mg per tablet Take 2 tablets by mouth daily at bedtime      sertraline (ZOLOFT) 25 mg tablet Take 25 mg by mouth daily at bedtime       !! - Potential duplicate medications found  Please discuss with provider  No discharge procedures on file      PDMP Review       Value Time User    PDMP Reviewed  Yes 6/3/2023  7:35 AM Haylee Murray PA-C          ED Provider  Electronically Signed by           Nicole Combs MD  06/25/23 5715

## 2023-06-25 NOTE — ED NOTES
"Pt complaining of feeling hot and of pain in his left hip  Temp noted to be 98 1   Pt given ice bag behind neck for comfort  Pt repositioned in bed to provide relief of hip pain  During assessment, pt stated that he ambulates at home using a cane or walker but when asked to assist with repositioning by pushing up with his legs, pt states \"My legs don't work  \"  Reminded pt that he states he ambulates at home and he says \"I could tell you anything, really  \"       Keith Alexandra RN  06/25/23 6787    "

## 2023-06-25 NOTE — ED NOTES
Pt ringing call bell, now states his right hip is painful as well as his left hip        Lady Miller RN  06/25/23 6381

## 2023-06-25 NOTE — CONSULTS
UROLOGY CONSULTATION NOTE     Patient Identifiers: Giorgi Simmons (MRN [id number])  Service Requesting Consultation: [facility]  Service Providing Consultation:  Urology, SHA Klein    Date of Service: 6/25/2023  Consults    Reason for Consultation: pyelonephritis     ASSESSMENT/PLAN:     Left-sided pyelonephritis   · Urine culture pending  · Continue Rocephin     Chronic SPT  · No output since last evening  · Exchanged SPT at bedside, 1 Liter cloudy urine   · Flush PRN to prevent blockage         History of Present Illness:     Salma Meza is a 67 y o  male who presented to the ED early this am with weakness and shortness of breath and states his urine is darker that usual   He also reported no urine output in bag since last evening  He has a chronic SPT due to MS which is changed monthly  Unsure of last exchange date  ED work up with CT reveals suspicion of cystitis and left-sided pyelonephritis  Upon seeing patient, he was very uncomfortable, reporting nausea and abdominal pain  No urine output was seen in dela cruz bag  Exchanged SPT and immediately got a return of a liter of cloudy urine  Flushed catheter well and patent         Past Medical, Past Surgical History:     Past Medical History:   Diagnosis Date   • Acute laryngitis    • Acute nonsuppurative otitis media, unspecified laterality    • Arm weakness    • Arthritis    • 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HCC)    • Bladder infection    • Bronchitis    • Constipation    • Cough    • Diabetes mellitus ([facility])    • Dizziness    • Dysfunction of eustachian tube    • Erectile dysfunction of non-organic origin    • Fatigue    • Glaucoma    • Hiatal hernia    • Hypertension    • Imbalance    • Leg muscle spasm    • MS (multiple sclerosis) ([facility])    • Nephrolithiasis    • Neurogenic bladder    • No natural teeth    • Sinus pain    • Spinal stenosis    • Strain of thoracic region    • Stroke [facility])    • Suprapubic catheter ([facility])    :    Past Surgical History:   Procedure Laterality Date   • APPENDECTOMY     • BRAIN SURGERY      Coil placed in aneurysm   • CYSTOSCOPY     • CYSTOSCOPY     • CYSTOSCOPY  06/11/2018   • CYSTOSCOPY  01/15/2021   • EYE SURGERY      transscleral cyclophotocoagulation noncontact YAG laser   • MYRINGOTOMY      with ventilation tube insertion   • WI LITHOLAPAXY SMPL/SM <2 5 CM N/A 5/7/2019    Procedure: CYSTOSCOPY, holmium laser litholapaxy of bladder stones, EXCHANGE OF SP TUBE;  Surgeon: Kristal Mccoy MD;  Location: [facility];  Service: Urology   • SUPRAPUBIC CATHETER INSERTION     :    Medications, Allergies:     Current Facility-Administered Medications   Medication Dose Route Frequency   • acetaminophen (TYLENOL) tablet 650 mg  650 mg Oral Q6H PRN   • albuterol (PROVENTIL HFA,VENTOLIN HFA) inhaler 2 puff  2 puff Inhalation Q6H PRN   • ALPRAZolam (XANAX) tablet 0 25 mg  0 25 mg Oral BID PRN   • aluminum-magnesium hydroxide-simethicone (MYLANTA) oral suspension 30 mL  30 mL Oral Q4H PRN   • [START ON 6/26/2023] cefTRIAXone (ROCEPHIN) IVPB (premix in dextrose) 1,000 mg 50 mL  1,000 mg Intravenous Q24H   • clopidogrel (PLAVIX) tablet 75 mg  75 mg Oral Daily   • enoxaparin (LOVENOX) subcutaneous injection 40 mg  40 mg Subcutaneous Daily   • gabapentin (NEURONTIN) capsule 300 mg  300 mg Oral BID   • gabapentin (NEURONTIN) capsule 600 mg  600 mg Oral HS   • hydrALAZINE (APRESOLINE) injection 10 mg  10 mg Intravenous Q6H PRN   • insulin lispro (HumaLOG) 100 units/mL subcutaneous injection 2-12 Units  2-12 Units Subcutaneous TID AC   • insulin lispro (HumaLOG) 100 units/mL subcutaneous injection 2-12 Units  2-12 Units Subcutaneous HS   • latanoprost (XALATAN) 0 005 % ophthalmic solution 1 drop  1 drop Both Eyes HS   • melatonin tablet 3 mg  3 mg Oral HS PRN   • nystatin (MYCOSTATIN) powder   Topical BID   • ondansetron (ZOFRAN) injection 4 mg  4 mg Intravenous Q8H PRN   • pantoprazole (PROTONIX) EC tablet 40 mg  40 mg Oral BID AC   • polyethylene glycol (MIRALAX) packet 17 g  17 g Oral Daily PRN   • propranolol (INDERAL LA) 24 hr capsule 60 mg  60 mg Oral Daily   • senna-docusate sodium (SENOKOT S) 8 6-50 mg per tablet 2 tablet  2 tablet Oral HS   • sertraline (ZOLOFT) tablet 25 mg  25 mg Oral HS   • sodium chloride 0 9 % with KCl 20 mEq/L infusion (premix)  100 mL/hr Intravenous Continuous   • traMADol (ULTRAM) tablet 50 mg  50 mg Oral Q6H PRN       Allergies: Allergies   Allergen Reactions   • Cephalexin Rash   :    Social and Family History:   Social History:   Social History     Tobacco Use   • Smoking status: Former     Packs/day: 0 50     Years: 31 00     Total pack years: 15 50     Types: Cigarettes     Start date:      Quit date:      Years since quittin 4   • Smokeless tobacco: Never   Vaping Use   • Vaping Use: Never used   Substance Use Topics   • Alcohol use: Not Currently   • Drug use: No        Social History     Tobacco Use   Smoking Status Former   • Packs/day: 0 50   • Years:    • Total pack years: 15 50   • Types: Cigarettes   • Start date:    • Quit date:    • Years since quittin 4   Smokeless Tobacco Never       Family History:  Family History   Problem Relation Age of Onset   • Heart attack Mother    • Stroke Mother    • Heart attack Father    • Anuerysm Father         In Stomach    :     Review of Systems:     Review of Systems - History obtained from the patient  General ROS: positive for  - fatigue  Respiratory ROS: positive for - shortness of breath  Cardiovascular ROS: no chest pain or dyspnea on exertion  Gastrointestinal ROS: positive for - abdominal pain and constipation  Genito-Urinary ROS: darker urine, no recent output in bag   Musculoskeletal ROS: positive for - weakness   Dermatological ROS: negative    All other systems queried were negative  Physical Exam:   General: Patient is pleasant and in NAD   Awake and alert  /77   Pulse (!) 113   Temp 98 °F (36 7 °C) (Oral) "Resp 18   Ht 5' 4\" (1 626 m)   Wt 75 1 kg (165 lb 9 1 oz)   SpO2 93%   BMI 28 42 kg/m² Temp (24hrs), Av 2 °F (36 8 °C), Min:98 °F (36 7 °C), Max:98 3 °F (36 8 °C)  current; Temperature: 98 °F (36 7 °C)  I/O last 24 hours: In: -   Out: 700 [Urine:700]    /77   Pulse (!) 113   Temp 98 °F (36 7 °C) (Oral)   Resp 18   Ht 5' 4\" (1 626 m)   Wt 75 1 kg (165 lb 9 1 oz)   SpO2 93%   BMI 28 42 kg/m²   General appearance: alert and oriented, in no acute distress  Back: symmetric, no curvature  ROM normal  No CVA tenderness  Lungs: clear to auscultation bilaterally  Heart: regular rate and rhythm, S1, S2 normal, no murmur, click, rub or gallop  Abdomen: abnormal findings:  suprapubic tenderness, SPT   Male genitalia: normal  Extremities: extremities normal, warm and well-perfused; no cyanosis, clubbing, or edema  Pulses: 2+ and symmetric  Skin: Skin color, texture, turgor normal  No rashes or lesions  Neurologic: Grossly normal    Labs:     Lab Results   Component Value Date    HGB 14 4 2023    HCT 42 9 2023    WBC 16 75 (H) 2023     2023   ]    Lab Results   Component Value Date     2016    K 3 8 2023    CL 96 2023    CO2 22 2023    BUN 12 2023    CREATININE 0 91 2023    CALCIUM 9 7 2023    GLUCOSE 190 (H) 10/21/2018   ]    Imaging:   I personally reviewed the images and report of the following studies, and reviewed them with the patient:    CT Suprapubic catheter terminates within the bladder  Moderate irregular bladder wall thickening with surrounding inflammatory changes, highly suspicious for cystitis      Scattered areas of heterogeneous enhancement in the upper and midportion of the left kidney suspicious for left-sided pyelonephritis  Thank you for allowing me to participate in this patients’ care  Please do not hesitate to call with any additional questions    SHA Triplett      "

## 2023-06-25 NOTE — H&P
H&P Exam - Parker Perez 67 y o  male MRN: [id number]    Unit/Bed#: E5 -01 Encounter: [id number]        History of Present Illness   HPI:  Parker Perez is a 67 y o  male who presents with weakness and shortness of breath  He relates symptoms started about midnight day before admission with progressive weakness also feels his urine is darker but no actual fever  Also complains of bilateral hip pain  Patient has a long term history of MS with urinary incontinence and currently has a suprapubic catheter change monthly, has history of acute UTI  ED work-up CT scan suspicious for cystitis and left-sided pyelonephritis  Review of Systems   Constitutional: Positive for fatigue  HENT: Negative  Eyes: Negative  Respiratory: Positive for shortness of breath  Cardiovascular: Negative  Gastrointestinal: Positive for constipation  Genitourinary: Positive for decreased urine volume  Musculoskeletal: Positive for arthralgias  Skin: Negative  Neurological: Positive for weakness  Hematological: Negative  Psychiatric/Behavioral: Negative          Historical Information   Past Medical History:   Diagnosis Date   • Acute laryngitis    • Acute nonsuppurative otitis media, unspecified laterality    • Arm weakness    • Arthritis    • 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HCC)    • Bladder infection    • Bronchitis    • Constipation    • Cough    • Diabetes mellitus (HCC)    • Dizziness    • Dysfunction of eustachian tube    • Erectile dysfunction of non-organic origin    • Fatigue    • Glaucoma    • Hiatal hernia    • Hypertension    • Imbalance    • Leg muscle spasm    • MS (multiple sclerosis) (HCC)    • Nephrolithiasis    • Neurogenic bladder    • No natural teeth    • Sinus pain    • Spinal stenosis    • Strain of thoracic region    • Stroke [facility])    • Suprapubic catheter [facility])      Past Surgical History:   Procedure Laterality Date   • APPENDECTOMY     • BRAIN SURGERY      Coil placed in aneurysm • CYSTOSCOPY     • CYSTOSCOPY     • CYSTOSCOPY  2018   • CYSTOSCOPY  01/15/2021   • EYE SURGERY      transscleral cyclophotocoagulation noncontact YAG laser   • MYRINGOTOMY      with ventilation tube insertion   • RI LITHOLAPAXY SMPL/SM <2 5 CM N/A 2019    Procedure: CYSTOSCOPY, holmium laser litholapaxy of bladder stones, EXCHANGE OF SP TUBE;  Surgeon: Anna King MD;  Location: [facility];  Service: Urology   • SUPRAPUBIC CATHETER INSERTION       Social History   Social History     Substance and Sexual Activity   Alcohol Use Not Currently     Social History     Substance and Sexual Activity   Drug Use No     Social History     Tobacco Use   Smoking Status Former   • Packs/day: 0 50   • Years: 31 00   • Total pack years: 15 50   • Types: Cigarettes   • Start date:    • Quit date:    • Years since quittin 4   Smokeless Tobacco Never     Family History   Problem Relation Age of Onset   • Heart attack Mother    • Stroke Mother    • Heart attack Father    • Anuerysm Father         In Stomach        Meds/Allergies   Medications Prior to Admission   Medication   • albuterol (2 5 mg/3 mL) 0 083 % nebulizer solution   • albuterol (PROVENTIL HFA,VENTOLIN HFA) 90 mcg/act inhaler   • ALPRAZolam (XANAX) 0 25 mg tablet   • amLODIPine-atorvastatin (CADUET) 10-80 MG per tablet   • clopidogrel (PLAVIX) 75 mg tablet   • Dulaglutide (Trulicity) 9 87 FF/9 4ZK SOPN   • Ergocalciferol (VITAMIN D2 PO)   • furosemide (LASIX) 40 mg tablet   • gabapentin (NEURONTIN) 300 mg capsule   • gabapentin (NEURONTIN) 300 mg capsule   • latanoprost (XALATAN) 0 005 % ophthalmic solution   • melatonin 3 mg   • pantoprazole (PROTONIX) 40 mg tablet   • polyethylene glycol (MIRALAX) 17 g packet   • potassium chloride (Klor-Con) 10 mEq tablet   • propranolol (INDERAL LA) 60 mg 24 hr capsule   • senna-docusate sodium (SENOKOT S) 8 6-50 mg per tablet   • sertraline (ZOLOFT) 25 mg tablet     Allergies   Allergen Reactions   • "Cephalexin Rash       Objective   Vitals: Blood pressure 113/66, pulse (!) 118, temperature 98 °F (36 7 °C), temperature source Oral, resp  rate 18, height 5' 4\" (1 626 m), weight 75 1 kg (165 lb 9 1 oz), SpO2 96 %  No intake or output data in the 24 hours ending 06/25/23 0948    Invasive Devices     Peripheral Intravenous Line  Duration           Peripheral IV 06/25/23 Right Antecubital <1 day          Drain  Duration           Suprapubic Catheter 20 Fr  19 days                Physical Exam  Constitutional:       Appearance: Normal appearance  He is normal weight  HENT:      Head: Normocephalic  Nose: Nose normal       Mouth/Throat:      Mouth: Mucous membranes are dry  Eyes:      Extraocular Movements: Extraocular movements intact  Pupils: Pupils are equal, round, and reactive to light  Cardiovascular:      Rate and Rhythm: Regular rhythm  Tachycardia present  Heart sounds: Normal heart sounds  Pulmonary:      Effort: Pulmonary effort is normal       Breath sounds: Normal breath sounds  Abdominal:      General: Abdomen is flat  Bowel sounds are normal       Palpations: Abdomen is soft  Musculoskeletal:      Cervical back: Neck supple  Right lower leg: Edema present  Left lower leg: Edema present  Skin:     General: Skin is dry  Neurological:      Mental Status: He is alert and oriented to person, place, and time     Psychiatric:         Mood and Affect: Mood normal          Behavior: Behavior normal          Lab Results:   Admission on 06/25/2023   Component Date Value   • Ventricular Rate 06/25/2023 127    • Atrial Rate 06/25/2023 127    • VT Interval 06/25/2023 154    • QRSD Interval 06/25/2023 88    • QT Interval 06/25/2023 308    • QTC Interval 06/25/2023 447    • P Axis 06/25/2023 58    • QRS Axis 06/25/2023 64    • T Wave Axis 06/25/2023 14    • WBC 06/25/2023 16 75 (H)    • RBC 06/25/2023 4 95    • Hemoglobin 06/25/2023 14 4    • Hematocrit 06/25/2023 42 9    • MCV " 06/25/2023 87    • MCH 06/25/2023 29 1    • MCHC 06/25/2023 33 6    • RDW 06/25/2023 13 6    • MPV 06/25/2023 8 1 (L)    • Platelets 78/20/9028 342    • nRBC 06/25/2023 0    • Neutrophils Relative 06/25/2023 70    • Immat GRANS % 06/25/2023 0    • Lymphocytes Relative 06/25/2023 18    • Monocytes Relative 06/25/2023 9    • Eosinophils Relative 06/25/2023 2    • Basophils Relative 06/25/2023 1    • Neutrophils Absolute 06/25/2023 11 67 (H)    • Immature Grans Absolute 06/25/2023 0 06    • Lymphocytes Absolute 06/25/2023 2 99    • Monocytes Absolute 06/25/2023 1 48 (H)    • Eosinophils Absolute 06/25/2023 0 41    • Basophils Absolute 06/25/2023 0 14 (H)    • Sodium 06/25/2023 129 (L)    • Potassium 06/25/2023 3 8    • Chloride 06/25/2023 96    • CO2 06/25/2023 22    • ANION GAP 06/25/2023 11    • BUN 06/25/2023 12    • Creatinine 06/25/2023 0 91    • Glucose 06/25/2023 292 (H)    • Calcium 06/25/2023 9 7    • eGFR 06/25/2023 83    • Protime 06/25/2023 12 1    • INR 06/25/2023 0 90    • PTT 06/25/2023 26    • hs TnI 0hr 06/25/2023 8    • BNP 06/25/2023 33    • D-Dimer, Quant 06/25/2023 0 37    • hs TnI 2hr 06/25/2023 12    • Delta 2hr hsTnI 06/25/2023 4    • Color, UA 06/25/2023 Yellow    • Clarity, UA 06/25/2023 Cloudy    • pH, UA 06/25/2023 5 5    • Leukocytes, UA 06/25/2023 Small (A)    • Nitrite, UA 06/25/2023 Negative    • Protein, UA 06/25/2023 Trace (A)    • Glucose, UA 06/25/2023 >=1000 (1%) (A)    • Ketones, UA 06/25/2023 Negative    • Urobilinogen, UA 06/25/2023 0 2    • Bilirubin, UA 06/25/2023 Negative    • Occult Blood, UA 06/25/2023 Moderate (A)    • Specific [place], UA 06/25/2023 1 010    • RBC, UA 06/25/2023 Innumerable (A)    • WBC, UA 06/25/2023 Innumerable (A)    • Epithelial Cells 06/25/2023 Occasional    • Bacteria, UA 06/25/2023 Occasional    • Budding Yeast 06/25/2023 Present    • Hyphae Yeast 06/25/2023 Present    • LACTIC ACID 06/25/2023 2 5 LONG TERM ACUTE [facility] CARE AT [facility])      Imaging: CT chest abdomen pelvis w contrast    Result Date: 6/25/2023  Narrative: CT CHEST, ABDOMEN AND PELVIS WITH IV CONTRAST INDICATION:   Sepsis sob, cough, wbc 16  H/o multiple sclerosis with suprapubic catheter  Frequent uti and pneumonia  Normal CXR  [facility] COMPARISON: CT chest, abdomen, and pelvis dated 6/3/2023 TECHNIQUE: CT examination of the chest, abdomen and pelvis was performed  Multiplanar 2D reformatted images were created from the source data  This examination, like all CT scans performed in the [facility], was performed utilizing techniques to minimize radiation dose exposure, including the use of iterative reconstruction and automated exposure control  Radiation dose length product (DLP) for this visit:  606 99 mGy-cm IV Contrast:  100 mL of iohexol (OMNIPAQUE) Enteric Contrast: Enteric contrast was administered  FINDINGS: CHEST LUNGS/PLEURA: Mild scattered parenchymal and paraseptal emphysematous changes  Lungs otherwise appear grossly clear  HEART/GREAT VESSELS: Moderate coronary atherosclerosis  Heart appears normal in size  Mild to moderate aortic atherosclerosis  No thoracic aortic aneurysm  MEDIASTINUM AND ERICA:  Unremarkable  CHEST WALL AND LOWER NECK:  Unremarkable  ABDOMEN LIVER/BILIARY TREE:  Unremarkable  GALLBLADDER:  No calcified gallstones  No pericholecystic inflammatory change  SPLEEN:  Unremarkable  PANCREAS:  Unremarkable  ADRENAL GLANDS:  Unremarkable  KIDNEYS/URETERS: Normal appearance of the right kidney  1 6 cm cyst in the lower pole of the left kidney  Scattered areas of heterogeneous enhancement in the upper and midportion of the left kidney, suspicious for pyelonephritis  No hydronephrosis  STOMACH AND BOWEL: Large amount of stool in the rectum measuring approximately 11 6 x 8 0 x 7 1 cm in size with associated rectal wall thickening and perirectal inflammatory changes, suspicious for fecal impaction and associated stercoral colitis   Small hiatal hernia suspected; otherwise grossly unremarkable  APPENDIX: Not well seen ABDOMINOPELVIC CAVITY:  No ascites  No pneumoperitoneum  No lymphadenopathy  VESSELS: Moderate atherosclerosis; no aortic aneurysm  PELVIS REPRODUCTIVE ORGANS:  Unremarkable for patient's age  URINARY BLADDER: Suprapubic catheter terminates within the bladder  Moderate irregular bladder wall thickening with associated surrounding inflammatory changes, highly suspicious for cystitis  Small amount of air within the bladder lumen could be related  to instrumentation or cystitis  ABDOMINAL WALL/INGUINAL REGIONS: Mild body wall edema  OSSEOUS STRUCTURES: Multilevel degenerative changes of the spine  No acute fracture or destructive osseous lesion  Impression: Suprapubic catheter terminates within the bladder  Moderate irregular bladder wall thickening with surrounding inflammatory changes, highly suspicious for cystitis  Scattered areas of heterogeneous enhancement in the upper and midportion of the left kidney suspicious for left-sided pyelonephritis  Large amount of stool in the rectum measuring approximately 11 6 x 8 0 x 7 1 cm in size with associated rectal wall thickening and perirectal inflammatory changes, suspicious for fecal impaction and associated stercoral colitis  Mild scattered pulmonary parenchymal and paraseptal emphysematous changes, moderate coronary atherosclerosis, left renal cyst, small hiatal hernia, and other findings as above  Above findings communicated to Dr Chuck Cavanaugh on 6/25/2023 7:06 AM  Workstation performed: [id number]     XR chest 2 views    Result Date: 6/3/2023  Narrative: CHEST INDICATION:   sob  COMPARISON: 4/10/2023 EXAM PERFORMED/VIEWS:  XR CHEST PA & LATERAL FINDINGS: Cardiomediastinal silhouette appears unremarkable  Calcific atherosclerosis of the aortic arch region  The lungs are clear  No pneumothorax or pleural effusion  Osseous structures appear within normal limits for patient age   Mild multilevel degenerative changes throughout the thoracic spine  No free air in the upper abdomen  Impression: No acute cardiopulmonary disease  Workstation performed: [id number]     CT chest abdomen pelvis w contrast    Result Date: 6/3/2023  Narrative: CT CHEST, ABDOMEN AND PELVIS WITH IV CONTRAST INDICATION:   Sepsis sepsis, ro other etiology  COMPARISON: Abdominal pelvic CT performed August 7, 2020  TECHNIQUE: CT examination of the chest, abdomen and pelvis was performed  Multiplanar 2D reformatted images were created from the source data  This examination, like all CT scans performed in the [facility], was performed utilizing techniques to minimize radiation dose exposure, including the use of iterative reconstruction and automated exposure control  Radiation dose length product (DLP) for this visit:  615 mGy-cm IV Contrast:  100 mL of iohexol (OMNIPAQUE) Enteric Contrast: Enteric contrast was administered  FINDINGS: CHEST LUNGS: Few scattered punctate calcified granulomata  No suspicious noncalcified pulmonary mass  Mild atelectatic changes in the dependent lower lung zones  No consolidative or groundglass airspace opacity to suggest acute pneumonia  No tracheal or endobronchial lesion  PLEURA:  Unremarkable  HEART/GREAT VESSELS: Coronary artery calcification  Atherosclerotic calcifications associated with aortic arch and great vessels  No thoracic aortic aneurysm  MEDIASTINUM AND ERICA: Small sliding-type hiatal hernia  No mediastinal or hilar lymphadenopathy  CHEST WALL AND LOWER NECK:  Unremarkable  ABDOMEN LIVER/BILIARY TREE:  Unremarkable  GALLBLADDER:  No calcified gallstones  No pericholecystic inflammatory change  SPLEEN:  Unremarkable  PANCREAS:  Unremarkable  ADRENAL GLANDS:  Unremarkable   KIDNEYS/URETERS: Very mild left perinephric stranding along with subtle small wedge-shaped areas of nephrographic hypoenhancement in the upper pole of the left kidney on image 92 of series 601 and in the lateral interpolar left kidney on image 102 of series 601 are suspicious for very mild changes of acute pyelonephritis  Mild bilateral perinephric stranding Cortical cyst at the lower pole of the left kidney, 19 mm  No urinary tract calculi  No hydronephrosis  No suspicious solid renal mass  STOMACH AND BOWEL: Large and typical volume of stool seen in the colon and especially in the rectum where a large bolus of stool is present  Mild rectal wall thickening with mild perirectal inflammatory stranding consistent with stercoral proctitis  Small sliding-type hiatal hernia  Otherwise unremarkable CT appearance of stomach and small intestines  APPENDIX:  No findings to suggest appendicitis  ABDOMINOPELVIC CAVITY:  No ascites  No pneumoperitoneum  No lymphadenopathy  VESSELS:  Unremarkable for patient's age  PELVIS REPRODUCTIVE ORGANS:  Unremarkable for patient's age  URINARY BLADDER: There is a suprapubic catheter in the lumen of the bladder  However, the bladder is not decompressed but rather moderately distended with simple appearing fluid and few bubbles of gas  Mild uniform bladder wall thickening without significant perivesical inflammatory stranding noted  ABDOMINAL WALL/INGUINAL REGIONS: Suprapubic catheter  Otherwise unremarkable  OSSEOUS STRUCTURES:  No acute fracture or destructive osseous lesion  Impression: Findings suggestive of urinary tract infection including subtle areas of wedge-shaped nephrographic hypoenhancement in the left kidney suspicious for a mild changes of left-sided pyelonephritis    Also, the urinary bladder is not collapsed despite the presence of an intraluminal Cage catheter balloon and there is mild uniform bladder wall thickening  No other findings to account for sepsis  Constipation with stercoral proctitis  Workstation performed: [id number]     EKG, Pathology, and Other Studies: I have personally reviewed pertinent reports        Assessment/Plan     Sepsis   presents with tachycardia, leukocytosis, lactic acidosis and hyponatremia, most likely related to cystitis/pyelonephritis, will initiate IV fluids with antibiotics, reviewed last urine culture E  coli initiate Rocephin monitor blood and urine culture       Complicated UTI chronic suprapubic catheter, consult urology to exchange, monitor blood and urine cultures and initiate Rocephin    MS   supportive care patient reports having MS since the age of 11    Diabetes  poorly controlled recent hemoglobin A1c 9 2, initiate ADA diet and sliding scale    Hyponatremia  acute on chronic, will initiate IV normal saline and monitor    Hypertension  continue beta-blocker but hold Lasix and amlodipine with initial blood pressure 113/66    GERD   continue Protonix with as needed Maalox    MDD/anxiety  will continue Zoloft and as needed Xanax      Code Status: Level 1 - Full Code  Expect greater than 2 midnight stay

## 2023-06-25 NOTE — PLAN OF CARE
Problem: MOBILITY - ADULT  Goal: Maintain or return to baseline ADL function  Description: INTERVENTIONS:  -  Assess patient's ability to carry out ADLs; assess patient's baseline for ADL function and identify physical deficits which impact ability to perform ADLs (bathing, care of mouth/teeth, toileting, grooming, dressing, etc )  - Assess/evaluate cause of self-care deficits   - Assess range of motion  - Assess patient's mobility; develop plan if impaired  - Assess patient's need for assistive devices and provide as appropriate  - Encourage maximum independence but intervene and supervise when necessary  - Involve family in performance of ADLs  - Assess for home care needs following discharge   - Consider OT consult to assist with ADL evaluation and planning for discharge  - Provide patient education as appropriate  Outcome: Progressing  Goal: Maintains/Returns to pre admission functional level  Description: INTERVENTIONS:  - Perform BMAT or MOVE assessment daily    - Set and communicate daily mobility goal to care team and patient/family/caregiver  - Collaborate with rehabilitation services on mobility goals if consulted  - Perform Range of Motion  times a day  - Reposition patient every  hours    - Dangle patient  times a day  - Stand patient  times a day  - Ambulate patient  times a day  - Out of bed to chair  times a day   - Out of bed for meals  times a day  - Out of bed for toileting  - Record patient progress and toleration of activity level   Outcome: Progressing     Problem: PAIN - ADULT  Goal: Verbalizes/displays adequate comfort level or baseline comfort level  Description: Interventions:  - Encourage patient to monitor pain and request assistance  - Assess pain using appropriate pain scale  - Administer analgesics based on type and severity of pain and evaluate response  - Implement non-pharmacological measures as appropriate and evaluate response  - Consider cultural and social influences on pain and pain management  - Notify physician/advanced practitioner if interventions unsuccessful or patient reports new pain  Outcome: Progressing     Problem: INFECTION - ADULT  Goal: Absence or prevention of progression during hospitalization  Description: INTERVENTIONS:  - Assess and monitor for signs and symptoms of infection  - Monitor lab/diagnostic results  - Monitor all insertion sites, i e  indwelling lines, tubes, and drains  - Monitor endotracheal if appropriate and nasal secretions for changes in amount and color  - [place] appropriate cooling/warming therapies per order  - Administer medications as ordered  - Instruct and encourage patient and family to use good hand hygiene technique  - Identify and instruct in appropriate isolation precautions for identified infection/condition  Outcome: Progressing  Goal: Absence of fever/infection during neutropenic period  Description: INTERVENTIONS:  - Monitor WBC    Outcome: Progressing     Problem: SAFETY ADULT  Goal: Maintain or return to baseline ADL function  Description: INTERVENTIONS:  -  Assess patient's ability to carry out ADLs; assess patient's baseline for ADL function and identify physical deficits which impact ability to perform ADLs (bathing, care of mouth/teeth, toileting, grooming, dressing, etc )  - Assess/evaluate cause of self-care deficits   - Assess range of motion  - Assess patient's mobility; develop plan if impaired  - Assess patient's need for assistive devices and provide as appropriate  - Encourage maximum independence but intervene and supervise when necessary  - Involve family in performance of ADLs  - Assess for home care needs following discharge   - Consider OT consult to assist with ADL evaluation and planning for discharge  - Provide patient education as appropriate  Outcome: Progressing  Goal: Maintains/Returns to pre admission functional level  Description: INTERVENTIONS:  - Perform BMAT or MOVE assessment daily    - Set and communicate daily mobility goal to care team and patient/family/caregiver  - Collaborate with rehabilitation services on mobility goals if consulted  - Perform Range of Motion  times a day  - Reposition patient every  hours    - Dangle patient  times a day  - Stand patient  times a day  - Ambulate patient  times a day  - Out of bed to chair  times a day   - Out of bed for meals  times a day  - Out of bed for toileting  - Record patient progress and toleration of activity level   Outcome: Progressing  Goal: Patient will remain free of falls  Description: INTERVENTIONS:  - Educate patient/family on patient safety including physical limitations  - Instruct patient to call for assistance with activity   - Consult OT/PT to assist with strengthening/mobility   - Keep Call bell within reach  - Keep bed low and locked with side rails adjusted as appropriate  - Keep care items and personal belongings within reach  - Initiate and maintain comfort rounds  - Make Fall Risk Sign visible to staff  - Offer Toileting every  Hours, in advance of need  - Initiate/Maintain alarm  - Obtain necessary fall risk management equipment:   - Apply yellow socks and bracelet for high fall risk patients  - Consider moving patient to room near nurses station  Outcome: Progressing     Problem: DISCHARGE PLANNING  Goal: Discharge to home or other facility with appropriate resources  Description: INTERVENTIONS:  - Identify barriers to discharge w/patient and caregiver  - Arrange for needed discharge resources and transportation as appropriate  - Identify discharge learning needs (meds, wound care, etc )  - Arrange for interpretive services to assist at discharge as needed  - Refer to Case Management Department for coordinating discharge planning if the patient needs post-hospital services based on physician/advanced practitioner order or complex needs related to functional status, cognitive ability, or social support system  Outcome: Progressing Problem: Knowledge Deficit  Goal: Patient/family/caregiver demonstrates understanding of disease process, treatment plan, medications, and discharge instructions  Description: Complete learning assessment and assess knowledge base    Interventions:  - Provide teaching at level of understanding  - Provide teaching via preferred learning methods  Outcome: Progressing

## 2023-06-25 NOTE — ED CARE HANDOFF
Emergency Department Sign Out Note        Sign out and transfer of care from Dr Nika Alejandro  See Separate Emergency Department note  The patient, Yessy Hernandez, was evaluated by the previous provider for SOB/cough  H/o MS, DM, CVA, HTN, suprapubic catheter    6/3- 6/6/23 Admission- palpitations/SOB,   Diagnosed with catheter associated UTI/Pyelo- 3 days Ceftriaxone then to Doxycycline  Catheter changed during admission (changed x5kbztm)  UCx Enterococcus    Workup Completed:  Labs- WBC 16 75, Na 129 (134 6/6, 129 6/3)- acute on chronic  CXR unremarkable  Ddimer neg  Delta trop neg  EKG sinus tach    ED Course / Workup Pending (followup):  CT C/A/P for dispo          ED Course as of 06/25/23 0953   Sun Jun 25, 2023   0721 Pt now with UTI/pyelo, has SIRS and now infection, added blood cultures/lactic acid, will start IV abx and admit     Procedures  MDM        Disposition  Final diagnoses:   Pyelonephritis   Hyponatremia     Time reflects when diagnosis was documented in both MDM as applicable and the Disposition within this note     Time User Action Codes Description Comment    6/25/2023  7:46 AM Jodie Stapler A Add [N12] Pyelonephritis     6/25/2023  7:46 AM Jodie Stapler A Add [E87 1] Hyponatremia       ED Disposition     ED Disposition   Admit    Condition   Stable    Date/Time   Sun Jun 25, 2023  7:46 AM    Comment   Case was discussed with Dr Maurilio Hanks and the patient's admission status was agreed to be Admission Status: inpatient status to the service of Dr Maurilio Hanks              Follow-up Information    None       Current Discharge Medication List      CONTINUE these medications which have NOT CHANGED    Details   albuterol (2 5 mg/3 mL) 0 083 % nebulizer solution Take 2 5 mg by nebulization every 6 (six) hours as needed for wheezing or shortness of breath      albuterol (PROVENTIL HFA,VENTOLIN HFA) 90 mcg/act inhaler Inhale 2 puffs every 6 (six) hours as needed for wheezing      ALPRAZolam (XANAX) 0 25 mg tablet Take 0 25 mg by mouth 2 (two) times a day as needed for anxiety or sleep       amLODIPine-atorvastatin (CADUET) 10-80 MG per tablet Take 1 tablet by mouth daily      clopidogrel (PLAVIX) 75 mg tablet Take 1 tablet (75 mg total) by mouth daily  Refills: 0    Associated Diagnoses: Chronic suprapubic catheter ([facility] ); Neurogenic bladder; Cellulitis      Dulaglutide (Trulicity) 2 13 TQ/5 7VR SOPN Inject 0 75 mg under the skin once a week      Ergocalciferol (VITAMIN D2 PO) Take 50,000 Units by mouth once a week      furosemide (LASIX) 40 mg tablet Take 40 mg by mouth daily      !! gabapentin (NEURONTIN) 300 mg capsule Take 1 capsule (300 mg total) by mouth 2 (two) times a day  Qty: 60 capsule, Refills: 0    Associated Diagnoses: Multiple sclerosis ([facility] )      ! ! gabapentin (NEURONTIN) 300 mg capsule Take 2 capsules (600 mg total) by mouth daily at bedtime  Qty: 30 capsule, Refills: 0    Associated Diagnoses: Multiple sclerosis (HCC)      latanoprost (XALATAN) 0 005 % ophthalmic solution Administer 1 drop to both eyes daily at bedtime      melatonin 3 mg Take 3 mg by mouth daily at bedtime as needed      pantoprazole (PROTONIX) 40 mg tablet TAKE 1 TABLET TWICE DAILY 30 MINUTES BEFORE BREAKFAST AND DINNER  Qty: 180 tablet, Refills: 1    Associated Diagnoses: Gastroesophageal reflux disease without esophagitis      polyethylene glycol (MIRALAX) 17 g packet Take 17 g by mouth daily as needed      potassium chloride (Klor-Con) 10 mEq tablet Take 10 mEq by mouth 2 (two) times a day      propranolol (INDERAL LA) 60 mg 24 hr capsule Take 60 mg by mouth daily      senna-docusate sodium (SENOKOT S) 8 6-50 mg per tablet Take 2 tablets by mouth daily at bedtime      sertraline (ZOLOFT) 25 mg tablet Take 25 mg by mouth daily at bedtime       !! - Potential duplicate medications found  Please discuss with provider  No discharge procedures on file         ED Provider  Electronically Signed by     Haresh Hamilton DO  06/25/23 6966

## 2023-06-26 LAB
ANION GAP SERPL CALCULATED.3IONS-SCNC: 5 MMOL/L
BUN SERPL-MCNC: 7 MG/DL (ref 5–25)
CALCIUM SERPL-MCNC: 8.9 MG/DL (ref 8.4–10.2)
CHLORIDE SERPL-SCNC: 105 MMOL/L (ref 96–108)
CO2 SERPL-SCNC: 23 MMOL/L (ref 21–32)
CREAT SERPL-MCNC: 0.81 MG/DL (ref 0.6–1.3)
ERYTHROCYTE [DISTWIDTH] IN BLOOD BY AUTOMATED COUNT: 14 % (ref 11.6–15.1)
GFR SERPL CREATININE-BSD FRML MDRD: 88 ML/MIN/1.73SQ M
GLUCOSE SERPL-MCNC: 170 MG/DL (ref 65–140)
GLUCOSE SERPL-MCNC: 190 MG/DL (ref 65–140)
GLUCOSE SERPL-MCNC: 215 MG/DL (ref 65–140)
GLUCOSE SERPL-MCNC: 232 MG/DL (ref 65–140)
GLUCOSE SERPL-MCNC: 272 MG/DL (ref 65–140)
HCT VFR BLD AUTO: 41.1 % (ref 36.5–49.3)
HGB BLD-MCNC: 13.2 G/DL (ref 12–17)
MCH RBC QN AUTO: 28.8 PG (ref 26.8–34.3)
MCHC RBC AUTO-ENTMCNC: 32.1 G/DL (ref 31.4–37.4)
MCV RBC AUTO: 90 FL (ref 82–98)
PLATELET # BLD AUTO: 277 THOUSANDS/UL (ref 149–390)
PMV BLD AUTO: 8.2 FL (ref 8.9–12.7)
POTASSIUM SERPL-SCNC: 4.2 MMOL/L (ref 3.5–5.3)
RBC # BLD AUTO: 4.59 MILLION/UL (ref 3.88–5.62)
SODIUM SERPL-SCNC: 133 MMOL/L (ref 135–147)
WBC # BLD AUTO: 12.19 THOUSAND/UL (ref 4.31–10.16)

## 2023-06-26 PROCEDURE — 99232 SBSQ HOSP IP/OBS MODERATE 35: CPT | Performed by: PHYSICIAN ASSISTANT

## 2023-06-26 PROCEDURE — 82948 REAGENT STRIP/BLOOD GLUCOSE: CPT

## 2023-06-26 PROCEDURE — 99223 1ST HOSP IP/OBS HIGH 75: CPT | Performed by: INTERNAL MEDICINE

## 2023-06-26 PROCEDURE — 80048 BASIC METABOLIC PNL TOTAL CA: CPT | Performed by: PHYSICIAN ASSISTANT

## 2023-06-26 PROCEDURE — 85027 COMPLETE CBC AUTOMATED: CPT | Performed by: PHYSICIAN ASSISTANT

## 2023-06-26 RX ORDER — POLYETHYLENE GLYCOL 3350 17 G/17G
17 POWDER, FOR SOLUTION ORAL 2 TIMES DAILY
Status: DISCONTINUED | OUTPATIENT
Start: 2023-06-26 | End: 2023-06-28 | Stop reason: HOSPADM

## 2023-06-26 RX ADMIN — PANTOPRAZOLE SODIUM 40 MG: 40 TABLET, DELAYED RELEASE ORAL at 17:59

## 2023-06-26 RX ADMIN — PROPRANOLOL HYDROCHLORIDE 60 MG: 60 CAPSULE, EXTENDED RELEASE ORAL at 09:15

## 2023-06-26 RX ADMIN — NYSTATIN: 100000 POWDER TOPICAL at 17:59

## 2023-06-26 RX ADMIN — SODIUM CHLORIDE AND POTASSIUM CHLORIDE 100 ML/HR: .9; .15 SOLUTION INTRAVENOUS at 05:34

## 2023-06-26 RX ADMIN — GABAPENTIN 600 MG: 300 CAPSULE ORAL at 22:35

## 2023-06-26 RX ADMIN — CLOPIDOGREL BISULFATE 75 MG: 75 TABLET ORAL at 09:15

## 2023-06-26 RX ADMIN — INSULIN LISPRO 4 UNITS: 100 INJECTION, SOLUTION INTRAVENOUS; SUBCUTANEOUS at 11:53

## 2023-06-26 RX ADMIN — INSULIN LISPRO 2 UNITS: 100 INJECTION, SOLUTION INTRAVENOUS; SUBCUTANEOUS at 17:59

## 2023-06-26 RX ADMIN — INSULIN LISPRO 2 UNITS: 100 INJECTION, SOLUTION INTRAVENOUS; SUBCUTANEOUS at 09:15

## 2023-06-26 RX ADMIN — GABAPENTIN 300 MG: 300 CAPSULE ORAL at 17:59

## 2023-06-26 RX ADMIN — NYSTATIN: 100000 POWDER TOPICAL at 09:28

## 2023-06-26 RX ADMIN — SERTRALINE HYDROCHLORIDE 25 MG: 25 TABLET ORAL at 22:36

## 2023-06-26 RX ADMIN — GABAPENTIN 300 MG: 300 CAPSULE ORAL at 09:15

## 2023-06-26 RX ADMIN — PANTOPRAZOLE SODIUM 40 MG: 40 TABLET, DELAYED RELEASE ORAL at 05:36

## 2023-06-26 RX ADMIN — AMPICILLIN SODIUM 1000 MG: 1 INJECTION, POWDER, FOR SOLUTION INTRAMUSCULAR; INTRAVENOUS at 20:03

## 2023-06-26 RX ADMIN — CEFTRIAXONE 1000 MG: 1 INJECTION, SOLUTION INTRAVENOUS at 09:15

## 2023-06-26 RX ADMIN — SODIUM CHLORIDE AND POTASSIUM CHLORIDE 100 ML/HR: .9; .15 SOLUTION INTRAVENOUS at 15:50

## 2023-06-26 RX ADMIN — INSULIN LISPRO 4 UNITS: 100 INJECTION, SOLUTION INTRAVENOUS; SUBCUTANEOUS at 22:36

## 2023-06-26 RX ADMIN — LATANOPROST 1 DROP: 50 SOLUTION OPHTHALMIC at 22:36

## 2023-06-26 RX ADMIN — Medication 3 MG: at 22:41

## 2023-06-26 RX ADMIN — TRAMADOL HYDROCHLORIDE 50 MG: 50 TABLET, COATED ORAL at 22:41

## 2023-06-26 RX ADMIN — ENOXAPARIN SODIUM 40 MG: 40 INJECTION SUBCUTANEOUS at 09:15

## 2023-06-26 NOTE — PLAN OF CARE
Problem: MOBILITY - ADULT  Goal: Maintain or return to baseline ADL function  Description: INTERVENTIONS:  -  Assess patient's ability to carry out ADLs; assess patient's baseline for ADL function and identify physical deficits which impact ability to perform ADLs (bathing, care of mouth/teeth, toileting, grooming, dressing, etc )  - Assess/evaluate cause of self-care deficits   - Assess range of motion  - Assess patient's mobility; develop plan if impaired  - Assess patient's need for assistive devices and provide as appropriate  - Encourage maximum independence but intervene and supervise when necessary  - Involve family in performance of ADLs  - Assess for home care needs following discharge   - Consider OT consult to assist with ADL evaluation and planning for discharge  - Provide patient education as appropriate  Outcome: Progressing  Goal: Maintains/Returns to pre admission functional level  Description: INTERVENTIONS:  - Perform BMAT or MOVE assessment daily    - Set and communicate daily mobility goal to care team and patient/family/caregiver  - Collaborate with rehabilitation services on mobility goals if consulted  - Perform Range of Motion times a day  - Reposition patient every  hours    - Dangle patient  times a day  - Stand patient  times a day  - Ambulate patient  times a day  - Out of bed to chair times a day   - Out of bed for meals  times a day  - Out of bed for toileting  - Record patient progress and toleration of activity level   Outcome: Progressing

## 2023-06-26 NOTE — CONSULTS
Consultation - Infectious Disease   Gilda Abt 67 y o  male MRN: [id number]  Unit/Bed#: E5 -01 Encounter: [id number]      IMPRESSION & RECOMMENDATIONS:   Impression/Recommendations: This is a 67 y o  male, with MS, and neurogenic bladder requiring SPC placement has history of recurrent UTI, admitted on 6/25 with sepsis secondary to UTI  1   Sepsis, presenting with leukocytosis and tachycardia  Source of sepsis is most likely UTI  Patient with some clinical improvement  Tachycardia is resolved  WBC still elevated but decreasing  Despite sepsis, he has remained systemically well, without toxicity and hemodynamically stable, without hypotension  Admission blood cultures have no growth thus far  Antibiotic plan as in below  Monitor temperature/WBC  Monitor hemodynamics  Follow-up on pending blood cultures  2   Probable UTI  Although growth in urine culture is usually bladder colonization in a patient with chronic SPC, given sepsis without other obvious source, will treat patient for presumptive UTI  Patient has had ampicillin susceptible Enterococcus growing in the last few urine cultures, not treated  Although he is partially improved with IV ceftriaxone, will modify antibiotic regimen to cover for Enterococcus  Yeast in urine culture is most likely bladder colonization secondary to multiple antibiotic courses  Improvement on antibiotic also suggest that he is his colonizer and not pathogen  Patient about IV ampicillin  Monitor symptoms  Follow-up on final urine culture result  No need for antifungal at present  If patient continues to do well and blood cultures have no growth, anticipate transition to p o  amoxicillin in next 48 hours  Treat x10 days total    3  Neurogenic bladder, with SPC in place  Patient is only getting SPC exchange every month  This is risk factor for recurrent infection  Urology follow-up      4   DM, poorly controlled, with hyperglycemia and elevated hemoglobin A1c  This is risk factor for recurrent infection also  Management per primary service  5   MS, with mild extremity weakness  6   Cephalexin allergy with rash  Patient is able to tolerate other cephalosporins  He should have very low cross allergic reaction to PCN  Monitor for cross allergic reaction  Prior hospitalization and outpatient records reviewed in detail  Discussed with patient in detail regarding the above plan  Discussed with [facility] service  Thank you for this consultation  We will follow along with you  HISTORY OF PRESENT ILLNESS:  Reason for Consult: UTI/pyelonephritis  HPI: Sena Howe is a 67 y o  male, with multiple medical problems including MS and neurogenic bladder, with SPC in place for the last 2 years, has had history of recurrent UTI, came to ER with malaise and generalized weakness  On presentation, patient did not have fever but had leukocytosis, tachycardia and lactic acidosis  He was admitted and started on IV ceftriaxone for presumptive UTI  Patient with some improvement  Urine culture with growth of Candida  We are asked to evaluate the patient  At present, patient states that he feels a little better  Generalized weakness is improved  Malaise is improved  He denies abdominal or flank pain  No fever or chills at home  Patient has [facility] in place for the last 2 years, with exchange every month  He has had multiple episodes of UTI treated with various antibiotics  Patient states when he gets UTI, he would have similar symptoms, malaise and generalized weakness  REVIEW OF SYSTEMS:  A complete system-based review was done  Except for what is noted in HPI above, ROS of systems is otherwise negative      PAST MEDICAL HISTORY:  Past Medical History:   Diagnosis Date   • Acute laryngitis    • Acute nonsuppurative otitis media, unspecified laterality    • Arm weakness    • Arthritis    • 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)    • Bladder infection • Bronchitis    • Constipation    • Cough    • Diabetes mellitus (HCC)    • Dizziness    • Dysfunction of eustachian tube    • Erectile dysfunction of non-organic origin    • Fatigue    • Glaucoma    • Hiatal hernia    • Hypertension    • Imbalance    • Leg muscle spasm    • MS (multiple sclerosis) (HCC)    • Nephrolithiasis    • Neurogenic bladder    • No natural teeth    • Sinus pain    • Spinal stenosis    • Strain of thoracic region    • Stroke [facility])    • Suprapubic catheter ([address] )      Past Surgical History:   Procedure Laterality Date   • APPENDECTOMY     • BRAIN SURGERY      Coil placed in aneurysm   • CYSTOSCOPY     • CYSTOSCOPY     • CYSTOSCOPY  2018   • CYSTOSCOPY  01/15/2021   • EYE SURGERY      transscleral cyclophotocoagulation noncontact YAG laser   • MYRINGOTOMY      with ventilation tube insertion   • MN LITHOLAPAXY SMPL/SM <2 5 CM N/A 2019    Procedure: CYSTOSCOPY, holmium laser litholapaxy of bladder stones, EXCHANGE OF SP TUBE;  Surgeon: Ike Brunson MD;  Location: [facility];  Service: Urology   • SUPRAPUBIC CATHETER INSERTION       Problem list reviewed  FAMILY HISTORY:  Non-contributory    SOCIAL HISTORY:  Social History     Substance and Sexual Activity   Alcohol Use Not Currently     Social History     Substance and Sexual Activity   Drug Use No     Social History     Tobacco Use   Smoking Status Former   • Packs/day: 0 50   • Years: 31 00   • Total pack years: 15 50   • Types: Cigarettes   • Start date:    • Quit date:    • Years since quittin 5   Smokeless Tobacco Never       ALLERGIES:  Allergies   Allergen Reactions   • Cephalexin Rash       MEDICATIONS:  All current active medications have been reviewed  Patient is currently on IV ceftriaxone      PHYSICAL EXAM:  Vitals:  Temp:  [98 °F (36 7 °C)-98 7 °F (37 1 °C)] 98 1 °F (36 7 °C)  HR:  [73-93] 73  Resp:  [16-18] 18  BP: (117-149)/(62-77) 117/62  SpO2:  [93 %-96 %] 96 %  Temp (24hrs), Av 3 °F (36 8 °C), Min:98 °F (36 7 °C), Max:98 7 °F (37 1 °C)  Current: Temperature: 98 1 °F (36 7 °C)     Physical Exam:  General:  Well-nourished, well-developed, acute and chronically ill-appearing, in no acute distress  Awake, alert and oriented x 3  Eyes:  Conjunctive clear with no hemorrhages or effusions  Oropharynx:  No ulcers, no lesions, pharynx benign, no tonsillitis  Neck:  Supple, no lymphadenopathy, no mass, nontender  Lungs:  Expansion symmetric, no rales, no wheezing, no accessory muscle use  Cardiac:  Regular rate and rhythm, normal S1, normal S2, no murmurs  Abdomen:  Soft, nondistended, non-tender, no HSM  Extremities:  No edema, no erythema, nontender  No ulcers  Skin:  No rashes, no ulcers  Neurological: Some weakness but able to move all extremities    LABS, IMAGING, & OTHER STUDIES:  Lab Results:  I have personally reviewed pertinent labs  Results from last 7 days   Lab Units 06/26/23  0838 06/25/23  0332   POTASSIUM mmol/L 4 2 3 8   CHLORIDE mmol/L 105 96   CO2 mmol/L 23 22   BUN mg/dL 7 12   CREATININE mg/dL 0 81 0 91   EGFR ml/min/1 73sq m 88 83   CALCIUM mg/dL 8 9 9 7     Results from last 7 days   Lab Units 06/26/23  0838 06/25/23  0332   WBC Thousand/uL 12 19* 16 75*   HEMOGLOBIN g/dL 13 2 14 4   PLATELETS Thousands/uL 277 342     Results from last 7 days   Lab Units 06/25/23  0738 06/25/23  4821   BLOOD CULTURE  No Growth at 24 hrs  No Growth at 24 hrs   --    URINE CULTURE   --  >100,000 cfu/ml Yeast species*  60,000-69,000 cfu/ml Enterococcus faecalis*       Imaging Studies:   I have personally reviewed pertinent imaging study reports and images in PACS  CXR reviewed personally  No infiltrates  Chest/abdomen/pelvis CT reviewed personally  No consolidation  Bladder wall thickening  Scatter enhancement of the left kidney  EKG, Pathology, and Other Studies:   I have personally reviewed pertinent reports

## 2023-06-26 NOTE — UTILIZATION REVIEW
Initial Clinical Review    Admission: Date/Time/Statement:   Admission Orders (From admission, onward)     Ordered        06/25/23 0752  INPATIENT ADMISSION  Once                      Orders Placed This Encounter   Procedures   • INPATIENT ADMISSION     Standing Status:   Standing     Number of Occurrences:   1     Order Specific Question:   Level of Care     Answer:   Med Surg [16]     Order Specific Question:   Estimated length of stay     Answer:   More than 2 Midnights     Order Specific Question:   Certification     Answer:   I certify that inpatient services are medically necessary for this patient for a duration of greater than two midnights  See H&P and MD Progress Notes for additional information about the patient's course of treatment  ED Arrival Information     Expected   -    Arrival   6/25/2023 02:55    Acuity   Urgent            Means of arrival   Ambulance    Escorted by   [place] ([address])    Service   Hospitalist    Admission type   Emergency            Arrival complaint   SOB           Chief Complaint   Patient presents with   • Shortness of Breath     States SOB starting at 0000  Denies CP, headache, lightheadedness, dizziness  Initial Presentation: 67 y o  male presents to ED via  EMS   From home with weakness and shortness of breath  Symptoms started  The  Night prior to admission with progressive weakness, also feels  His urine is darker  Has  B/L  Hip pain  PMH is  MS  With urinary incontinence, currently  Has a  S/P  Catheter changed monthly and prior  UTI  Ct scan suspicious for   Cystitis  And  Pyelo  Meets sepsis criteria with  Tachycardia,leukocytosis and lactic acidosis, likely  R/T  Cystitis/pyelo  Admit  Ip with  Sepsis, complicated UTI  And plan is  SHARON, monitor labs, blood/urine cultures   And continue home meds  Urology consult  Infected/obstructed  S/P catheter exchanged  Follow  Cultures and irrigate  PRN     If no clinical improvement  In 48 hrs, needs  Re imaging  Date:     6/26     Day 2:   Continue IV  Rocephin  Follow  Blood/urine cultures  Multiple  BM's this am, continue with bowel regimen  Feels  Improved since admission  ID consult  Likely  Source of sepsis  UTI  Continue  SHARON  Some clinical improvement  Date:    6/27    Day 3: Has surpassed a 2nd midnight with active treatments and services, which include   Continue  SHARON  BC  NG thus far  S/P  Catheter intact  Continue to monitor labs        ED Triage Vitals [06/25/23 0259]   Temperature Pulse Respirations Blood Pressure SpO2   98 3 °F (36 8 °C) (!) 126 22 160/78 99 %      Temp Source Heart Rate Source Patient Position - Orthostatic VS BP Location FiO2 (%)   Oral Monitor Lying Right arm --      Pain Score       No Pain          Wt Readings from Last 1 Encounters:   06/25/23 75 1 kg (165 lb 9 1 oz)     Additional Vital Signs:   98 1 °F (36 7 °C) 73 18 117/62 80 96 % None (Room air) Lying    06/26/23 07:49:30 98 7 °F (37 1 °C) 93 17 142/77 99 93 % None (Room air) Lying   06/25/23 23:48:31 98 °F (36 7 °C) 81 16 149/73 98 95 % None (Room air) Lying   06/25/23 2030 -- -- -- -- -- -- None (Room air) --   06/25/23 15:25:57 97 9 °F (36 6 °C) 79 20 128/67 87 93 % None (Room air) Lying   06/25/23 11:35:27 -- 113 Abnormal  -- 167/77 107 93 % -- --   06/25/23 1135 -- -- -- 167/77 -- -- -- --   06/25/23 08:45:10 98 °F (36 7 °C) 118 Abnormal  18 113/66 82 96 % None (Room air) Lying   06/25/23 0815 -- 121 Abnormal  20 155/77 106 95 % None (Room air) Lying   06/25/23 0745 -- 121 Abnormal  20 149/67 96 96 % None (Room air) Lying   06/25/23 0730 -- 118 Abnormal  20 150/67 96 96 % None (Room air) Lying   06/25/23 0722 -- 119 Abnormal  18 158/65 -- 97 % None (Room air) Lying   06/25/23 0539 -- 120 Abnormal  18 143/65 -- 93 % None (Room air) Sitting   06/25/23 0445 -- 123 Abnormal  18 -- -- 95 % -- --   06/25/23 0259 98 3 °F (36 8 °C) 126 Abnormal  22 160/78 -- 99 % None (Room air) Lying       Pertinent Labs/Diagnostic Test Results:   CT chest abdomen pelvis w contrast   Final Result by Jag Macias DO (06/25 4487)      Suprapubic catheter terminates within the bladder  Moderate irregular bladder wall thickening with surrounding inflammatory changes, highly suspicious for cystitis  Scattered areas of heterogeneous enhancement in the upper and midportion of the left kidney suspicious for left-sided pyelonephritis  Large amount of stool in the rectum measuring approximately 11 6 x 8 0 x 7 1 cm in size with associated rectal wall thickening and perirectal inflammatory changes, suspicious for fecal impaction and associated stercoral colitis  Mild scattered pulmonary parenchymal and paraseptal emphysematous changes, moderate coronary atherosclerosis, left renal cyst, small hiatal hernia, and other findings as above  Above findings communicated to Dr Ngozi Liriano on 6/25/2023 7:06 AM             Workstation performed: [id number]         XR chest 2 views   ED Interpretation by Nicole Combs MD (06/25 1169)   No acute cardiopulmonary disease as interpreted by myself  Final Result by Yoly Agudelo MD (06/25 2054)      No acute cardiopulmonary disease                 Workstation performed: [id number]               Results from last 7 days   Lab Units 06/26/23  0838 06/25/23  0332   WBC Thousand/uL 12 19* 16 75*   HEMOGLOBIN g/dL 13 2 14 4   HEMATOCRIT % 41 1 42 9   PLATELETS Thousands/uL 277 342   NEUTROS ABS Thousands/µL  --  11 67*         Results from last 7 days   Lab Units 06/26/23  0838 06/25/23  0332   SODIUM mmol/L 133* 129*   POTASSIUM mmol/L 4 2 3 8   CHLORIDE mmol/L 105 96   CO2 mmol/L 23 22   ANION GAP mmol/L 5 11   BUN mg/dL 7 12   CREATININE mg/dL 0 81 0 91   EGFR ml/min/1 73sq m 88 83   CALCIUM mg/dL 8 9 9 7         Results from last 7 days   Lab Units 06/26/23  1115 06/26/23  0747 06/25/23  2136 06/25/23  1621 06/25/23  1107   POC GLUCOSE mg/dl 232* 170* 213* 145* 273*     Results from last 7 days   Lab Units 06/26/23  0838 06/25/23  0332   GLUCOSE RANDOM mg/dL 272* 292*               Results from last 7 days   Lab Units 06/25/23  0538 06/25/23  0332   HS TNI 0HR ng/L  --  8   HS TNI 2HR ng/L 12  --    HSTNI D2 ng/L 4  --      Results from last 7 days   Lab Units 06/25/23  0332   D-DIMER QUANTITATIVE ug/ml FEU 0 37     Results from last 7 days   Lab Units 06/25/23  0332   PROTIME seconds 12 1   INR  0 90   PTT seconds 26             Results from last 7 days   Lab Units 06/25/23  1302 06/25/23  0738   LACTIC ACID mmol/L 1 9 2 5*             Results from last 7 days   Lab Units 06/25/23  0332   BNP pg/mL 33           Results from last 7 days   Lab Units 06/25/23  0642   CLARITY UA  Cloudy   COLOR UA  Yellow   SPEC GRAV UA  1 010   PH UA  5 5   GLUCOSE UA mg/dl >=1000 (1%)*   KETONES UA mg/dl Negative   BLOOD UA  Moderate*   PROTEIN UA mg/dl Trace*   NITRITE UA  Negative   BILIRUBIN UA  Negative   UROBILINOGEN UA E U /dl 0 2   LEUKOCYTES UA  Small*   WBC UA /hpf Innumerable*   RBC UA /hpf Innumerable*   BACTERIA UA /hpf Occasional   EPITHELIAL CELLS WET PREP /hpf Occasional           Results from last 7 days   Lab Units 06/25/23  0738 06/25/23  6449   BLOOD CULTURE  No Growth at 24 hrs    No Growth at 24 hrs   --    URINE CULTURE   --  >100,000 cfu/ml Yeast species*  60,000-69,000 cfu/ml Enterococcus faecalis*                   ED Treatment:   Medication Administration from 06/25/2023 0255 to 06/25/2023 2062       Date/Time Order Dose Route Action Comments     06/25/2023 0429 EDT ondansetron (ZOFRAN) injection 4 mg 4 mg Intravenous Given --     06/25/2023 4750 EDT iohexol (OMNIPAQUE) 350 MG/ML injection (SINGLE-DOSE) 100 mL 100 mL Intravenous Given --     06/25/2023 0741 EDT cefTRIAXone (ROCEPHIN) IVPB (premix in dextrose) 1,000 mg 50 mL 1,000 mg Intravenous New Bag --     06/25/2023 0747 EDT acetaminophen (TYLENOL) tablet 975 mg 975 mg Oral Given -- 06/25/2023 0743 EDT sodium chloride 0 9 % bolus 1,000 mL 1,000 mL Intravenous New Bag --        Present on Admission:  • Sepsis ([facility] )  • Diabetic neuropathy (HCC)  • Esophageal reflux  • Generalized anxiety disorder  • Hyperlipidemia  • Multiple sclerosis ([facility] )  • Diabetes mellitus ([facility] )  • Urinary retention  • Constipation      Admitting Diagnosis: Hyponatremia [E87 1]  Pyelonephritis [N12]  Age/Sex: 67 y o  male  Admission Orders:  Scheduled Medications:  cefTRIAXone, 1,000 mg, Intravenous, Q24H  clopidogrel, 75 mg, Oral, Daily  enoxaparin, 40 mg, Subcutaneous, Daily  gabapentin, 300 mg, Oral, BID  gabapentin, 600 mg, Oral, HS  insulin lispro, 2-12 Units, Subcutaneous, TID AC  insulin lispro, 2-12 Units, Subcutaneous, HS  latanoprost, 1 drop, Both Eyes, HS  nystatin, , Topical, BID  pantoprazole, 40 mg, Oral, BID AC  polyethylene glycol, 17 g, Oral, BID  propranolol, 60 mg, Oral, Daily  senna-docusate sodium, 2 tablet, Oral, HS  sertraline, 25 mg, Oral, HS      Continuous IV Infusions:  sodium chloride 0 9 % with KCl 20 mEq/L, 100 mL/hr, Intravenous, Continuous      PRN Meds:  acetaminophen, 650 mg, Oral, Q6H PRN  albuterol, 2 puff, Inhalation, Q6H PRN  ALPRAZolam, 0 25 mg, Oral, BID PRN  aluminum-magnesium hydroxide-simethicone, 30 mL, Oral, Q4H PRN  hydrALAZINE, 10 mg, Intravenous, Q6H PRN  melatonin, 3 mg, Oral, HS PRN  ondansetron, 4 mg, Intravenous, Q8H PRN  polyethylene glycol, 17 g, Oral, Daily PRN  traMADol, 50 mg, Oral, Q6H PRN        IP CONSULT TO UROLOGY  IP CONSULT TO INFECTIOUS DISEASES    Network Utilization Review Department  ATTENTION: Please call with any questions or concerns to [phone] and carefully listen to the prompts so that you are directed to the right person   All voicemails are confidential   Shefali Hirsch all requests for admission clinical reviews, approved or denied determinations and any other requests to dedicated fax number below belonging to the campus where the patient is receiving treatment   List of dedicated fax numbers for the Facilities:  [address] [place] [address] DENIALS (Administrative/Medical Necessity) [phone]   [address]  (Maternity/NICU/Pediatrics) [phone]   9 Sarah Juárez [phone]   Juliet Lundy 77 [phone]   [address] [address] Lew Silva 28 [phone]   [address] [facility] [place] michael [facility] 134 815 [facility] [phone]

## 2023-06-26 NOTE — ASSESSMENT & PLAN NOTE
· History of longstanding MS  · Nonambulatory at baseline  · Continue gabapentin  · Neurology follow-up

## 2023-06-26 NOTE — ASSESSMENT & PLAN NOTE
· Patient met sepsis criteria at time of presentation with tachycardia, leukocytosis, lactic acidosis  · Suspect secondary to obstructed and infected suprapubic catheter  · Urology consulted, suprapubic catheter exchanged with cloudy urine return  · CT with suprapubic catheter terminating in the bladder, moderate irregular bladder wall thickening with surrounding inflammatory changes suspicious for cystitis  · Does have evidence of scattered areas of heterogeneous enhancement in the upper and midportion of the left kidney suspicious for pyelonephritis  · Currently receiving IV Rocephin, follow-up urine cultures  · Blood cultures pending

## 2023-06-26 NOTE — PROGRESS NOTES
[address] [place] [address]  Progress Note  Name: Neal Becerra I  MRN: [id number]  Unit/Bed#: E5 -01 I Date of Admission: 6/25/2023   Date of Service: 6/26/2023 I Hospital Day: 1    Assessment/Plan   * Sepsis [facility])  Assessment & Plan  · Patient met sepsis criteria at time of presentation with tachycardia, leukocytosis, lactic acidosis  · Suspect secondary to obstructed and infected suprapubic catheter  · Urology consulted, suprapubic catheter exchanged with cloudy urine return  · CT with suprapubic catheter terminating in the bladder, moderate irregular bladder wall thickening with surrounding inflammatory changes suspicious for cystitis  · Does have evidence of scattered areas of heterogeneous enhancement in the upper and midportion of the left kidney suspicious for pyelonephritis  · Currently receiving IV Rocephin, follow-up urine cultures  · Blood cultures pending    Constipation  Assessment & Plan  · CT scan concerning for large amount of stool in the rectum measuring 11 6 x 8 0 x 7 1 cm in size with associated rectal wall thickening and perirectal inflammatory changes suspicious for fecal impaction and stercoral colitis  · Continue MiraLAX  · Nursing reported patient had multiple bowel movements this morning, continue bowel regimen and monitoring    Hyponatremia  Assessment & Plan  · Sodium 129 at time of admission  · Hydrated with IV fluids  · Improved to 133 today    History of CVA (cerebrovascular accident)  Assessment & Plan  · History of CVA in 2018  · Continue Plavix and Lipitor    Diabetes mellitus [facility])  Assessment & Plan  Lab Results   Component Value Date    HGBA1C 9 2 (H) 06/03/2023       Recent Labs     06/25/23  1621 06/25/23  2136 06/26/23  0747 06/26/23  1115   POCGLU 145* 213* 170* 173*     · Hold Trulicity while inpatient  · Signs insulin coverage with Accu-Cheks  · Adjust pending glucose trends    Multiple sclerosis ([facility] [address] )  Assessment & Plan  · History of longstanding MS  · Nonambulatory at baseline  · Continue gabapentin  · Neurology follow-up    Esophageal reflux  Assessment & Plan  · Continue daily PPI    Chronic suprapubic catheter (HCC)  Assessment & Plan  · Chronic suprapubic catheter for neurogenic bladder  · Exchanged by urology yesterday             VTE Pharmacologic Prophylaxis:   High Risk (Score >/= 5) - Pharmacological DVT Prophylaxis Ordered: enoxaparin (Lovenox)  Sequential Compression Devices Ordered  Patient Centered Rounds: I performed bedside rounds with nursing staff today  Discussions with Specialists or Other Care Team Provider: Case management    Education and Discussions with Family / Patient: Patient declined call to   Total Time Spent on Date of Encounter in care of patient: 35 minutes This time was spent on one or more of the following: performing physical exam; counseling and coordination of care; obtaining or reviewing history; documenting in the medical record; reviewing/ordering tests, medications or procedures; communicating with other healthcare professionals and discussing with patient's family/caregivers  Current Length of Stay: 1 day(s)  Current Patient Status: Inpatient   Certification Statement: The patient will continue to require additional inpatient hospital stay due to Sepsis due to UTI from a blocked suprapubic catheter  Discharge Plan: Anticipate discharge in 48 hrs to home with home services  Code Status: Level 1 - Full Code    Subjective:   Patient reports he is doing okay today  Feels much better than he felt at time of admission  Denies any pain or discomfort  Denies any nausea or vomiting  Objective:     Vitals:   Temp (24hrs), Av 2 °F (36 8 °C), Min:97 9 °F (36 6 °C), Max:98 7 °F (37 1 °C)    Temp:  [97 9 °F (36 6 °C)-98 7 °F (37 1 °C)] 98 7 °F (37 1 °C)  HR:  [79-93] 93  Resp:  [16-20] 17  BP: (128-149)/(67-77) 142/77  SpO2:  [93 %-95 %] 93 %  Body mass index is 28 42 kg/m²       Input and Output Summary (last 24 hours): Intake/Output Summary (Last 24 hours) at 6/26/2023 1223  Last data filed at 6/26/2023 7637  Gross per 24 hour   Intake 480 ml   Output 2800 ml   Net -2320 ml       Physical Exam:   Physical Exam  Vitals reviewed  Constitutional:       General: He is not in acute distress  HENT:      Head: Normocephalic and atraumatic  Eyes:      General: No scleral icterus  Conjunctiva/sclera: Conjunctivae normal    Cardiovascular:      Rate and Rhythm: Normal rate and regular rhythm  Heart sounds: No murmur heard  Pulmonary:      Effort: Pulmonary effort is normal  No respiratory distress  Breath sounds: Normal breath sounds  Abdominal:      General: Bowel sounds are normal  There is no distension  Palpations: Abdomen is soft  Tenderness: There is no abdominal tenderness  Genitourinary:     Comments: Suprapubic catheter  Musculoskeletal:      Cervical back: Neck supple  Right lower leg: No edema  Left lower leg: No edema  Skin:     General: Skin is warm and dry  Neurological:      Mental Status: He is alert and oriented to person, place, and time  Motor: Weakness (Chronic lower extremity) present     Psychiatric:         Mood and Affect: Mood normal          Behavior: Behavior normal           Additional Data:     Labs:  Results from last 7 days   Lab Units 06/26/23  0838 06/25/23  0332   WBC Thousand/uL 12 19* 16 75*   HEMOGLOBIN g/dL 13 2 14 4   HEMATOCRIT % 41 1 42 9   PLATELETS Thousands/uL 277 342   NEUTROS PCT %  --  70   LYMPHS PCT %  --  18   MONOS PCT %  --  9   EOS PCT %  --  2     Results from last 7 days   Lab Units 06/26/23  0838   SODIUM mmol/L 133*   POTASSIUM mmol/L 4 2   CHLORIDE mmol/L 105   CO2 mmol/L 23   BUN mg/dL 7   CREATININE mg/dL 0 81   ANION GAP mmol/L 5   CALCIUM mg/dL 8 9   GLUCOSE RANDOM mg/dL 272*     Results from last 7 days   Lab Units 06/25/23  0332   INR  0 90     Results from last 7 days   Lab Units 06/26/23  1115 06/26/23  0747 06/25/23  2136 06/25/23  1621 06/25/23  1107   POC GLUCOSE mg/dl 232* 170* 213* 145* 273*         Results from last 7 days   Lab Units 06/25/23  1302 06/25/23  0738   LACTIC ACID mmol/L 1 9 2 5*       Lines/Drains:  Invasive Devices     Peripheral Intravenous Line  Duration           Peripheral IV 06/25/23 Right Antecubital 1 day          Drain  Duration           Suprapubic Catheter 20 Fr  1 day                      Imaging: Reviewed radiology reports from this admission including: abdominal/pelvic CT    Recent Cultures (last 7 days):   Results from last 7 days   Lab Units 06/25/23  0738 06/25/23  7234   BLOOD CULTURE  No Growth at 24 hrs  No Growth at 24 hrs   --    URINE CULTURE   --  Culture results to follow         Last 24 Hours Medication List:   Current Facility-Administered Medications   Medication Dose Route Frequency Provider Last Rate   • acetaminophen  650 mg Oral Q6H PRN [facility], DO     • albuterol  2 puff Inhalation Q6H PRN [facility], DO     • ALPRAZolam  0 25 mg Oral BID PRN [facility], DO     • aluminum-magnesium hydroxide-simethicone  30 mL Oral Q4H PRN [facility], DO     • cefTRIAXone  1,000 mg Intravenous Q24H [facility] DO 1,000 mg (06/26/23 0915)   • clopidogrel  75 mg Oral Daily [facility], DO     • enoxaparin  40 mg Subcutaneous Daily [facility], DO     • gabapentin  300 mg Oral BID [facility], DO     • gabapentin  600 mg Oral HS [facility], DO     • hydrALAZINE  10 mg Intravenous Q6H PRN [facility], DO     • insulin lispro  2-12 Units Subcutaneous TID [address], DO     • insulin lispro  2-12 Units Subcutaneous HS [facility], DO     • latanoprost  1 drop Both Eyes HS [facility], DO     • melatonin  3 mg Oral HS PRN [facility], DO     • nystatin   Topical BID [facility], DO     • ondansetron  4 mg Intravenous Q8H PRN [facility], DO     • pantoprazole  40 mg Oral BID AC [facility], DO     • polyethylene glycol  17 g Oral Daily PRN Arturo Sos, DO     • polyethylene glycol  17 g Oral BID Flaca Nails PA-C     • propranolol  60 mg Oral Daily Arturo Sos, DO     • senna-docusate sodium  2 tablet Oral HS Arturo Sos, DO     • sertraline  25 mg Oral HS Arturo Sos, DO     • sodium chloride 0 9 % with KCl 20 mEq/L  100 mL/hr Intravenous Continuous Flaca Nails PA-C 100 mL/hr (06/26/23 0534)   • traMADol  50 mg Oral Q6H PRN Arturo Sos, DO          Today, Patient Was Seen By: Lien Thorne PA-C    **Please Note: This note may have been constructed using a voice recognition system  **

## 2023-06-26 NOTE — PLAN OF CARE
Problem: MOBILITY - ADULT  Goal: Maintain or return to baseline ADL function  Description: INTERVENTIONS:  -  Assess patient's ability to carry out ADLs; assess patient's baseline for ADL function and identify physical deficits which impact ability to perform ADLs (bathing, care of mouth/teeth, toileting, grooming, dressing, etc )  - Assess/evaluate cause of self-care deficits   - Assess range of motion  - Assess patient's mobility; develop plan if impaired  - Assess patient's need for assistive devices and provide as appropriate  - Encourage maximum independence but intervene and supervise when necessary  - Involve family in performance of ADLs  - Assess for home care needs following discharge   - Consider OT consult to assist with ADL evaluation and planning for discharge  - Provide patient education as appropriate  Outcome: Progressing  Goal: Maintains/Returns to pre admission functional level  Description: INTERVENTIONS:  - Perform BMAT or MOVE assessment daily    - Set and communicate daily mobility goal to care team and patient/family/caregiver     - Collaborate with rehabilitation services on mobility goals if consulted  - Out of bed for toileting  - Record patient progress and toleration of activity level   Outcome: Progressing     Problem: PAIN - ADULT  Goal: Verbalizes/displays adequate comfort level or baseline comfort level  Description: Interventions:  - Encourage patient to monitor pain and request assistance  - Assess pain using appropriate pain scale  - Administer analgesics based on type and severity of pain and evaluate response  - Implement non-pharmacological measures as appropriate and evaluate response  - Consider cultural and social influences on pain and pain management  - Notify physician/advanced practitioner if interventions unsuccessful or patient reports new pain  Outcome: Progressing     Problem: INFECTION - ADULT  Goal: Absence or prevention of progression during hospitalization  Description: INTERVENTIONS:  - Assess and monitor for signs and symptoms of infection  - Monitor lab/diagnostic results  - Monitor all insertion sites, i e  indwelling lines, tubes, and drains  - Monitor endotracheal if appropriate and nasal secretions for changes in amount and color  - Satanta appropriate cooling/warming therapies per order  - Administer medications as ordered  - Instruct and encourage patient and family to use good hand hygiene technique  - Identify and instruct in appropriate isolation precautions for identified infection/condition  Outcome: Progressing     Problem: SAFETY ADULT  Goal: Maintain or return to baseline ADL function  Description: INTERVENTIONS:  -  Assess patient's ability to carry out ADLs; assess patient's baseline for ADL function and identify physical deficits which impact ability to perform ADLs (bathing, care of mouth/teeth, toileting, grooming, dressing, etc )  - Assess/evaluate cause of self-care deficits   - Assess range of motion  - Assess patient's mobility; develop plan if impaired  - Assess patient's need for assistive devices and provide as appropriate  - Encourage maximum independence but intervene and supervise when necessary  - Involve family in performance of ADLs  - Assess for home care needs following discharge   - Consider OT consult to assist with ADL evaluation and planning for discharge  - Provide patient education as appropriate  Outcome: Progressing  Goal: Maintains/Returns to pre admission functional level  Description: INTERVENTIONS:  - Perform BMAT or MOVE assessment daily    - Set and communicate daily mobility goal to care team and patient/family/caregiver     - Collaborate with rehabilitation services on mobility goals if consulted  - Out of bed for toileting  - Record patient progress and toleration of activity level   Outcome: Progressing  Goal: Patient will remain free of falls  Description: INTERVENTIONS:  - Educate patient/family on patient safety including physical limitations  - Instruct patient to call for assistance with activity   - Consult OT/PT to assist with strengthening/mobility   - Keep Call bell within reach  - Keep bed low and locked with side rails adjusted as appropriate  - Keep care items and personal belongings within reach  - Initiate and maintain comfort rounds  - Make Fall Risk Sign visible to staff  - Apply yellow socks and bracelet for high fall risk patients  - Consider moving patient to room near nurses station  Outcome: Progressing     Problem: DISCHARGE PLANNING  Goal: Discharge to home or other facility with appropriate resources  Description: INTERVENTIONS:  - Identify barriers to discharge w/patient and caregiver  - Arrange for needed discharge resources and transportation as appropriate  - Identify discharge learning needs (meds, wound care, etc )  - Arrange for interpretive services to assist at discharge as needed  - Refer to Case Management Department for coordinating discharge planning if the patient needs post-hospital services based on physician/advanced practitioner order or complex needs related to functional status, cognitive ability, or social support system  Outcome: Progressing     Problem: Knowledge Deficit  Goal: Patient/family/caregiver demonstrates understanding of disease process, treatment plan, medications, and discharge instructions  Description: Complete learning assessment and assess knowledge base    Interventions:  - Provide teaching at level of understanding  - Provide teaching via preferred learning methods  Outcome: Progressing     Problem: Prexisting or High Potential for Compromised Skin Integrity  Goal: Skin integrity is maintained or improved  Description: INTERVENTIONS:  - Identify patients at risk for skin breakdown  - Assess and monitor skin integrity  - Assess and monitor nutrition and hydration status  - Monitor labs   - Assess for incontinence   - Turn and reposition patient  - Assist with mobility/ambulation  - Relieve pressure over bony prominences  - Avoid friction and shearing  - Provide appropriate hygiene as needed including keeping skin clean and dry  - Evaluate need for skin moisturizer/barrier cream  - Collaborate with interdisciplinary team   - Patient/family teaching  - Consider wound care consult   Outcome: Progressing

## 2023-06-26 NOTE — ASSESSMENT & PLAN NOTE
Lab Results   Component Value Date    HGBA1C 9 2 (H) 06/03/2023       Recent Labs     06/25/23  1621 06/25/23  2136 06/26/23  0747 06/26/23  1115   POCGLU 145* 213* 170* 087*     · Hold Trulicity while inpatient  · Signs insulin coverage with Accu-Cheks  · Adjust pending glucose trends

## 2023-06-26 NOTE — CASE MANAGEMENT
Case Management Assessment & Discharge Planning Note    Patient name Talley Mediate  Location East 5 /E5 MS 0-* MRN [id number]  : 1950 Date 2023       Current Admission Date: 2023  Current Admission Diagnosis:Sepsis [facility])   Patient Active Problem List    Diagnosis Date Noted   • Constipation 2023   • Chronic diastolic congestive heart failure ([address] ) 2023   • Acute metabolic encephalopathy    • Excessive daytime sleepiness 2022   • Shortness of breath 2022   • Head injury 2021   • Sepsis ([facility] [address] ) 2021   • Pancreatic mass 2020   • Pancreatic lesion 2020   • Bladder stones 2019   • Bladder neck contracture 2019   • History of CVA (cerebrovascular accident) 10/21/2018   • 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[facility] [address] ) 2017   • Diabetic neuropathy ([facility] [address] ) 2016   • Chronic suprapubic catheter ([facility] [address] ) 2016   • Cellulitis 2016   • Thyroid nodule 2015   • Cervical spinal stenosis 2014   • Generalized anxiety disorder 2014   • Confusion 2014   • Urinary retention 2014   • Obstructive sleep apnea 2013   • Benign colon polyp 2012   • Esophageal reflux 2012   • Fatty liver 2012   • Glaucoma 2012   • Hyperlipidemia 2012   • Multiple sclerosis ([facility] [address] ) 2012   • Diabetes mellitus ([facility] [address] ) 2012      LOS (days): 1  Geometric Mean LOS (GMLOS) (days):   Days to GMLOS:     OBJECTIVE:  PATIENT READMITTED TO HOSPITAL  Risk of Unplanned Readmission Score: 20 22     Current admission status: Inpatient    Preferred Pharmacy:   [id number] [place], [address] [place] [address]  Phone: [phone] Fax: [phone]    [address] [place] [address] [facility] [address]  Phone: [phone] Fax: [phone]    Primary Care Provider: Diane Dempsey DO    Primary Insurance: [address] [place] [address] [facility]  Secondary Insurance:     ASSESSMENT:  [address] [facility] [address] Representative - Brother   Primary Phone: [phone] (Home)                 Readmission Root Cause  30 Day Readmission: Yes  Who directed you to return to the hospital?: Family  Did you understand whom to contact if you had questions or problems?: Yes  Were you able to get to your follow-up appointments?: Yes  Patient was readmitted due to: Sepsis    Patient Information  Admitted from[de-identified] Home  Mental Status: Confused  Assessment information provided by[de-identified] Patient  Primary Caregiver: Family  Caregiver's Name[de-identified] Libertad Joya (Brother) Janelle Zamora (Sister) [phone] (H)  Caregiver's Relationship to Patient[de-identified] Family Member  Support Systems: Friends/neighbors, Family members  [place] of Residence: [address] do you live in?: [email or web address]  entry access options  Select all that apply : Stairs  Number of steps to enter home : 1  Type of Current Residence: [address] [place] [address]  In the last 12 months, was there a time when you were not able to pay the mortgage or rent on time?: Yes  In the last 12 months, how many places have you lived?: 1  In the last 12 months, was there a time when you did not have a steady place to sleep or slept in a shelter (including now)?: No  Living Arrangements: Other (Comment) (lives with brother and sister)    Activities of Daily Living Prior to Admission  Functional Status: Total dependent  Completes ADLs independently?: No  Level of ADL dependence: Total Dependent  Ambulates independently?: No  Level of ambulatory dependence: Total Dependent  Does patient use assisted devices?: Yes  Assisted Devices (DME) used:  Wheelchair  Does patient currently own DME?: Yes  What DME does the patient currently own?: Wheelchair  Does patient have a history of Outpatient Therapy (PT/OT)?: Yes  Does patient have a history of HHC?: No  Does patient currently have [place] ?: Yes    Current Home Health Care  Type of Current Home Care Services: Other (Comment) ([facility])    Patient Information Continued  Income Source: SSI/SSD  Within the past 12 months, you worried that your food would run out before you got the money to buy more : Never true  Within the past 12 months, the food you bought just didn't last and you didn't have money to get more : Never true  Food insecurity resource given?: N/A  Does patient have a history of substance abuse?: No  Does patient have a history of Mental Health Diagnosis?: No    Means of Transportation  Means of Transport to Appts[de-identified] Family transport  In the past 12 months, has lack of transportation kept you from medical appointments or from getting medications?: No  In the past 12 months, has lack of transportation kept you from meetings, work, or from getting things needed for daily living?: No  Was application for public transport provided?: N/A    DISCHARGE DETAILS:    Discharge planning discussed with[de-identified] [facility] of Choice: Yes     CM contacted family/caregiver?: Yes  Were Treatment Team discharge recommendations reviewed with patient/caregiver?: Yes  Did patient/caregiver verbalize understanding of patient care needs?: Yes  Were patient/caregiver advised of the risks associated with not following Treatment Team discharge recommendations?: Yes    Contacts  Patient Contacts: Tiffany Alonso (Brother) [phone] at bedside  Relationship to Patient[de-identified] Family  Contact Method: Phone  Phone Number: Tiffany Alonso [facility]) [phone] at bedside  Reason/Outcome: Continuity of Care, Emergency Contact, Discharge Planning  Treatment Team Recommendation: Home    Additional Comments: CM spoke with brother Jhonny Howe about discharge planning and attempted to contact [facility]   Left a voice message, will call again today  Spoke with patient, he would like to go back home  Patient's brother is the main caregiver and patient lives with his brother and sister   Planning for d/c home with wheelchair Gary Raya pending physician approval

## 2023-06-26 NOTE — ASSESSMENT & PLAN NOTE
· CT scan concerning for large amount of stool in the rectum measuring 11 6 x 8 0 x 7 1 cm in size with associated rectal wall thickening and perirectal inflammatory changes suspicious for fecal impaction and stercoral colitis  · Continue MiraLAX  · Nursing reported patient had multiple bowel movements this morning, continue bowel regimen and monitoring

## 2023-06-27 LAB
BACTERIA UR CULT: ABNORMAL
BACTERIA UR CULT: ABNORMAL
GLUCOSE SERPL-MCNC: 135 MG/DL (ref 65–140)
GLUCOSE SERPL-MCNC: 156 MG/DL (ref 65–140)
GLUCOSE SERPL-MCNC: 184 MG/DL (ref 65–140)
GLUCOSE SERPL-MCNC: 230 MG/DL (ref 65–140)

## 2023-06-27 PROCEDURE — 99232 SBSQ HOSP IP/OBS MODERATE 35: CPT | Performed by: PHYSICIAN ASSISTANT

## 2023-06-27 PROCEDURE — 82948 REAGENT STRIP/BLOOD GLUCOSE: CPT

## 2023-06-27 RX ADMIN — ALPRAZOLAM 0.25 MG: 0.25 TABLET ORAL at 17:34

## 2023-06-27 RX ADMIN — INSULIN LISPRO 2 UNITS: 100 INJECTION, SOLUTION INTRAVENOUS; SUBCUTANEOUS at 17:27

## 2023-06-27 RX ADMIN — SERTRALINE HYDROCHLORIDE 25 MG: 25 TABLET ORAL at 22:48

## 2023-06-27 RX ADMIN — GABAPENTIN 300 MG: 300 CAPSULE ORAL at 08:54

## 2023-06-27 RX ADMIN — HYDROCORTISONE: 25 CREAM TOPICAL at 17:28

## 2023-06-27 RX ADMIN — INSULIN LISPRO 2 UNITS: 100 INJECTION, SOLUTION INTRAVENOUS; SUBCUTANEOUS at 22:46

## 2023-06-27 RX ADMIN — GABAPENTIN 600 MG: 300 CAPSULE ORAL at 22:47

## 2023-06-27 RX ADMIN — CEFTRIAXONE 1000 MG: 1 INJECTION, SOLUTION INTRAVENOUS at 08:54

## 2023-06-27 RX ADMIN — NYSTATIN: 100000 POWDER TOPICAL at 08:55

## 2023-06-27 RX ADMIN — Medication 3 MG: at 22:47

## 2023-06-27 RX ADMIN — AMPICILLIN SODIUM 1000 MG: 1 INJECTION, POWDER, FOR SOLUTION INTRAMUSCULAR; INTRAVENOUS at 17:26

## 2023-06-27 RX ADMIN — ENOXAPARIN SODIUM 40 MG: 40 INJECTION SUBCUTANEOUS at 08:54

## 2023-06-27 RX ADMIN — PANTOPRAZOLE SODIUM 40 MG: 40 TABLET, DELAYED RELEASE ORAL at 07:16

## 2023-06-27 RX ADMIN — CLOPIDOGREL BISULFATE 75 MG: 75 TABLET ORAL at 08:54

## 2023-06-27 RX ADMIN — PANTOPRAZOLE SODIUM 40 MG: 40 TABLET, DELAYED RELEASE ORAL at 17:27

## 2023-06-27 RX ADMIN — INSULIN LISPRO 4 UNITS: 100 INJECTION, SOLUTION INTRAVENOUS; SUBCUTANEOUS at 11:21

## 2023-06-27 RX ADMIN — GABAPENTIN 300 MG: 300 CAPSULE ORAL at 17:27

## 2023-06-27 RX ADMIN — TRAMADOL HYDROCHLORIDE 50 MG: 50 TABLET, COATED ORAL at 22:48

## 2023-06-27 RX ADMIN — LATANOPROST 1 DROP: 50 SOLUTION OPHTHALMIC at 22:46

## 2023-06-27 RX ADMIN — AMPICILLIN SODIUM 1000 MG: 1 INJECTION, POWDER, FOR SOLUTION INTRAMUSCULAR; INTRAVENOUS at 10:06

## 2023-06-27 RX ADMIN — NYSTATIN: 100000 POWDER TOPICAL at 17:27

## 2023-06-27 RX ADMIN — AMPICILLIN SODIUM 1000 MG: 1 INJECTION, POWDER, FOR SOLUTION INTRAMUSCULAR; INTRAVENOUS at 02:59

## 2023-06-27 RX ADMIN — PROPRANOLOL HYDROCHLORIDE 60 MG: 60 CAPSULE, EXTENDED RELEASE ORAL at 08:55

## 2023-06-27 RX ADMIN — AMPICILLIN SODIUM 1000 MG: 1 INJECTION, POWDER, FOR SOLUTION INTRAMUSCULAR; INTRAVENOUS at 22:46

## 2023-06-27 NOTE — PROGRESS NOTES
[address] [place] [address]  Progress Note  Name: Radha Vallejo I  MRN: [id number]  Unit/Bed#: E5 -01 I Date of Admission: 6/25/2023   Date of Service: 6/27/2023 I Hospital Day: 2    Assessment/Plan   * Sepsis [facility])  Assessment & Plan  · Patient met sepsis criteria at time of presentation with tachycardia, leukocytosis, lactic acidosis  · Suspect secondary to obstructed and infected suprapubic catheter  · Urology consulted, suprapubic catheter exchanged with cloudy urine return  · CT with suprapubic catheter terminating in the bladder, moderate irregular bladder wall thickening with surrounding inflammatory changes suspicious for cystitis  · Does have evidence of scattered areas of heterogeneous enhancement in the upper and midportion of the left kidney suspicious for pyelonephritis  · Currently receiving IV Rocephin and IV ampicillin per infectious disease  · Blood cultures negative at 24 hours  · Urine culture growing Enterococcus faecalis and yeast, suspect yeast is a contaminant  · Recheck labs in a m      Constipation  Assessment & Plan  · CT scan concerning for large amount of stool in the rectum measuring 11 6 x 8 0 x 7 1 cm in size with associated rectal wall thickening and perirectal inflammatory changes suspicious for fecal impaction and stercoral colitis  · Continue MiraLAX  · Continue bowel regimen    Hyponatremia  Assessment & Plan  · Sodium 129 at time of admission  · Hydrated with IV fluids, discontinued  · Improved to 133     History of CVA (cerebrovascular accident)  Assessment & Plan  · History of CVA in 2018  · Continue Plavix and Lipitor    Diabetes mellitus [facility])  Assessment & Plan  Lab Results   Component Value Date    HGBA1C 9 2 (H) 06/03/2023       Recent Labs     06/26/23  1115 06/26/23  1537 06/26/23  2103 06/27/23  0729   POCGLU 232* 190* 215* 677     · Hold Trulicity while inpatient  · Signs insulin coverage with Accu-Cheks  · Adjust pending glucose trends    Multiple sclerosis ([facility] [address] )  Assessment & Plan  · History of longstanding MS  · Nonambulatory at baseline  · Continue gabapentin  · Neurology follow-up    Esophageal reflux  Assessment & Plan  · Continue daily PPI    Chronic suprapubic catheter (HCC)  Assessment & Plan  · Chronic suprapubic catheter for neurogenic bladder  · Exchanged by urology on day of admission             VTE Pharmacologic Prophylaxis:   High Risk (Score >/= 5) - Pharmacological DVT Prophylaxis Ordered: enoxaparin (Lovenox)  Sequential Compression Devices Ordered  Patient Centered Rounds: I performed bedside rounds with nursing staff today  Discussions with Specialists or Other Care Team Provider: Case management    Education and Discussions with Family / Patient: Patient declined call to   Total Time Spent on Date of Encounter in care of patient: 35 minutes This time was spent on one or more of the following: performing physical exam; counseling and coordination of care; obtaining or reviewing history; documenting in the medical record; reviewing/ordering tests, medications or procedures; communicating with other healthcare professionals and discussing with patient's family/caregivers  Current Length of Stay: 2 day(s)  Current Patient Status: Inpatient   Certification Statement: The patient will continue to require additional inpatient hospital stay due to Sepsis due to UTI requiring IV antibiotics pending cultures  Discharge Plan: Anticipate discharge in 24-48 hrs to home  Code Status: Level 1 - Full Code    Subjective:   Patient reports he is doing okay today  Denies any complaints  Feeling much better than at time of admission  Reports he only drains his suprapubic catheter 3-4 times a day, does not like to be hooked up to the bag all day and would not like this in the future      Objective:     Vitals:   Temp (24hrs), Av 1 °F (36 7 °C), Min:97 8 °F (36 6 °C), Max:98 3 °F (36 8 °C)    Temp:  [97 8 °F (36 6 °C)-98 3 °F (36 8 °C)] 97 8 °F (36 6 °C)  HR:  [73-86] 78  Resp:  [16-18] 17  BP: (117-133)/(56-65) 133/65  SpO2:  [94 %-96 %] 95 %  Body mass index is 28 42 kg/m²  Input and Output Summary (last 24 hours): Intake/Output Summary (Last 24 hours) at 6/27/2023 1009  Last data filed at 6/27/2023 0759  Gross per 24 hour   Intake 1080 ml   Output 2450 ml   Net -1370 ml       Physical Exam:   Physical Exam  Vitals reviewed  Constitutional:       General: He is not in acute distress  HENT:      Head: Normocephalic and atraumatic  Eyes:      General: No scleral icterus  Conjunctiva/sclera: Conjunctivae normal    Cardiovascular:      Rate and Rhythm: Normal rate and regular rhythm  Heart sounds: No murmur heard  Pulmonary:      Effort: Pulmonary effort is normal  No respiratory distress  Breath sounds: Normal breath sounds  Abdominal:      General: Bowel sounds are normal  There is no distension  Palpations: Abdomen is soft  Tenderness: There is no abdominal tenderness  Genitourinary:     Comments: Suprapubic catheter draining yellow urine  Musculoskeletal:      Cervical back: Neck supple  Right lower leg: No edema  Left lower leg: No edema  Skin:     General: Skin is warm and dry  Neurological:      Mental Status: He is alert and oriented to person, place, and time  Motor: Weakness present     Psychiatric:         Mood and Affect: Mood normal          Behavior: Behavior normal           Additional Data:     Labs:  Results from last 7 days   Lab Units 06/26/23  0838 06/25/23  0332   WBC Thousand/uL 12 19* 16 75*   HEMOGLOBIN g/dL 13 2 14 4   HEMATOCRIT % 41 1 42 9   PLATELETS Thousands/uL 277 342   NEUTROS PCT %  --  70   LYMPHS PCT %  --  18   MONOS PCT %  --  9   EOS PCT %  --  2     Results from last 7 days   Lab Units 06/26/23  0838   SODIUM mmol/L 133*   POTASSIUM mmol/L 4 2   CHLORIDE mmol/L 105   CO2 mmol/L 23   BUN mg/dL 7   CREATININE mg/dL 0 81   ANION GAP mmol/L 5 CALCIUM mg/dL 8 9   GLUCOSE RANDOM mg/dL 272*     Results from last 7 days   Lab Units 06/25/23  0332   INR  0 90     Results from last 7 days   Lab Units 06/27/23  0729 06/26/23  2103 06/26/23  1537 06/26/23  1115 06/26/23  0747 06/25/23  2136 06/25/23  1621 06/25/23  1107   POC GLUCOSE mg/dl 135 215* 190* 232* 170* 213* 145* 273*         Results from last 7 days   Lab Units 06/25/23  1302 06/25/23  0738   LACTIC ACID mmol/L 1 9 2 5*       Lines/Drains:  Invasive Devices     Peripheral Intravenous Line  Duration           Peripheral IV 06/25/23 Right Antecubital 2 days          Drain  Duration           Suprapubic Catheter 20 Fr  1 day                      Imaging: No pertinent imaging reviewed  Recent Cultures (last 7 days):   Results from last 7 days   Lab Units 06/25/23  0738 06/25/23  9815   BLOOD CULTURE  No Growth at 24 hrs    No Growth at 24 hrs   --    URINE CULTURE   --  >100,000 cfu/ml Yeast species*  60,000-69,000 cfu/ml Enterococcus faecalis*       Last 24 Hours Medication List:   Current Facility-Administered Medications   Medication Dose Route Frequency Provider Last Rate   • acetaminophen  650 mg Oral Q6H PRN Arleth Inoue, DO     • albuterol  2 puff Inhalation Q6H PRN Arleth Inoue, DO     • ALPRAZolam  0 25 mg Oral BID PRN Arleth Inoue, DO     • aluminum-magnesium hydroxide-simethicone  30 mL Oral Q4H PRN Arleth Inoue, DO     • ampicillin  1,000 mg Intravenous Q6H Yeni Vang MD 1,000 mg (06/27/23 1006)   • cefTRIAXone  1,000 mg Intravenous Q24H Arleth Inoue, DO 1,000 mg (06/27/23 0854)   • clopidogrel  75 mg Oral Daily Arleth Inoue, DO     • enoxaparin  40 mg Subcutaneous Daily Arleth Inoue, DO     • gabapentin  300 mg Oral BID Arleth Inoue, DO     • gabapentin  600 mg Oral HS Arleth Inoue, DO     • hydrALAZINE  10 mg Intravenous Q6H PRN Arleth Inoue, DO     • insulin lispro  2-12 Units Subcutaneous TID [address], DO     • insulin lispro  2-12 Units Subcutaneous HS Arleth Inoue, DO • latanoprost  1 drop Both Eyes HS Jacquelyn Lair, DO     • melatonin  3 mg Oral HS PRN Jacquelyn Lair, DO     • nystatin   Topical BID Jacquelyn Lair, DO     • ondansetron  4 mg Intravenous Q8H PRN Jacquelyn Lair, DO     • pantoprazole  40 mg Oral BID AC Jacquelyn Lair, DO     • polyethylene glycol  17 g Oral Daily PRN Jacquelyn Lair, DO     • polyethylene glycol  17 g Oral BID Nohemi Henriquez PA-C     • propranolol  60 mg Oral Daily Jacquelyn Lair, DO     • senna-docusate sodium  2 tablet Oral HS Jacquelyn Lair, DO     • sertraline  25 mg Oral HS Jacquelyn Lair, DO     • traMADol  50 mg Oral Q6H PRN Jacquelyn Lair, DO          Today, Patient Was Seen By: Aria Moss PA-C    **Please Note: This note may have been constructed using a voice recognition system  **

## 2023-06-27 NOTE — PLAN OF CARE
Problem: MOBILITY - ADULT  Goal: Maintain or return to baseline ADL function  Description: INTERVENTIONS:  -  Assess patient's ability to carry out ADLs; assess patient's baseline for ADL function and identify physical deficits which impact ability to perform ADLs (bathing, care of mouth/teeth, toileting, grooming, dressing, etc )  - Assess/evaluate cause of self-care deficits   - Assess range of motion  - Assess patient's mobility; develop plan if impaired  - Assess patient's need for assistive devices and provide as appropriate  - Encourage maximum independence but intervene and supervise when necessary  - Involve family in performance of ADLs  - Assess for home care needs following discharge   - Consider OT consult to assist with ADL evaluation and planning for discharge  - Provide patient education as appropriate  Outcome: Progressing  Goal: Maintains/Returns to pre admission functional level  Description: INTERVENTIONS:  - Perform BMAT or MOVE assessment daily    - Set and communicate daily mobility goal to care team and patient/family/caregiver     - Collaborate with rehabilitation services on mobility goals if consulted  - Out of bed for toileting  - Record patient progress and toleration of activity level   Outcome: Progressing     Problem: PAIN - ADULT  Goal: Verbalizes/displays adequate comfort level or baseline comfort level  Description: Interventions:  - Encourage patient to monitor pain and request assistance  - Assess pain using appropriate pain scale  - Administer analgesics based on type and severity of pain and evaluate response  - Implement non-pharmacological measures as appropriate and evaluate response  - Consider cultural and social influences on pain and pain management  - Notify physician/advanced practitioner if interventions unsuccessful or patient reports new pain  Outcome: Progressing     Problem: INFECTION - ADULT  Goal: Absence or prevention of progression during hospitalization  Description: INTERVENTIONS:  - Assess and monitor for signs and symptoms of infection  - Monitor lab/diagnostic results  - Monitor all insertion sites, i e  indwelling lines, tubes, and drains  - Monitor endotracheal if appropriate and nasal secretions for changes in amount and color  - [place] appropriate cooling/warming therapies per order  - Administer medications as ordered  - Instruct and encourage patient and family to use good hand hygiene technique  - Identify and instruct in appropriate isolation precautions for identified infection/condition  Outcome: Progressing     Problem: SAFETY ADULT  Goal: Maintain or return to baseline ADL function  Description: INTERVENTIONS:  -  Assess patient's ability to carry out ADLs; assess patient's baseline for ADL function and identify physical deficits which impact ability to perform ADLs (bathing, care of mouth/teeth, toileting, grooming, dressing, etc )  - Assess/evaluate cause of self-care deficits   - Assess range of motion  - Assess patient's mobility; develop plan if impaired  - Assess patient's need for assistive devices and provide as appropriate  - Encourage maximum independence but intervene and supervise when necessary  - Involve family in performance of ADLs  - Assess for home care needs following discharge   - Consider OT consult to assist with ADL evaluation and planning for discharge  - Provide patient education as appropriate  Outcome: Progressing  Goal: Maintains/Returns to pre admission functional level  Description: INTERVENTIONS:  - Perform BMAT or MOVE assessment daily    - Set and communicate daily mobility goal to care team and patient/family/caregiver     - Collaborate with rehabilitation services on mobility goals if consulted  - Out of bed for toileting  - Record patient progress and toleration of activity level   Outcome: Progressing  Goal: Patient will remain free of falls  Description: INTERVENTIONS:  - Educate patient/family on patient safety including physical limitations  - Instruct patient to call for assistance with activity   - Consult OT/PT to assist with strengthening/mobility   - Keep Call bell within reach  - Keep bed low and locked with side rails adjusted as appropriate  - Keep care items and personal belongings within reach  - Initiate and maintain comfort rounds  - Make Fall Risk Sign visible to staff  - Apply yellow socks and bracelet for high fall risk patients  - Consider moving patient to room near nurses station  Outcome: Progressing     Problem: DISCHARGE PLANNING  Goal: Discharge to home or other facility with appropriate resources  Description: INTERVENTIONS:  - Identify barriers to discharge w/patient and caregiver  - Arrange for needed discharge resources and transportation as appropriate  - Identify discharge learning needs (meds, wound care, etc )  - Arrange for interpretive services to assist at discharge as needed  - Refer to Case Management Department for coordinating discharge planning if the patient needs post-hospital services based on physician/advanced practitioner order or complex needs related to functional status, cognitive ability, or social support system  Outcome: Progressing     Problem: Knowledge Deficit  Goal: Patient/family/caregiver demonstrates understanding of disease process, treatment plan, medications, and discharge instructions  Description: Complete learning assessment and assess knowledge base    Interventions:  - Provide teaching at level of understanding  - Provide teaching via preferred learning methods  Outcome: Progressing     Problem: Prexisting or High Potential for Compromised Skin Integrity  Goal: Skin integrity is maintained or improved  Description: INTERVENTIONS:  - Identify patients at risk for skin breakdown  - Assess and monitor skin integrity  - Assess and monitor nutrition and hydration status  - Monitor labs   - Assess for incontinence   - Turn and reposition patient  - Assist with mobility/ambulation  - Relieve pressure over bony prominences  - Avoid friction and shearing  - Provide appropriate hygiene as needed including keeping skin clean and dry  - Evaluate need for skin moisturizer/barrier cream  - Collaborate with interdisciplinary team   - Patient/family teaching  - Consider wound care consult   Outcome: Progressing

## 2023-06-27 NOTE — UTILIZATION REVIEW
Notification of Unplanned, Urgent, or   Emergency Inpatient Admission   [address] [address] [facility]  Tax ID: [id number]  NPI: [id number]  Place of Service: [facility]  60[address]  Admission Level of Care: Inpatient  Place of Service Code: 24     Attending Physician Information  Attending Name and NPI#: Malika Nicanor [place] [[id number]]  Phone: [phone]     Admission Information  Inpatient Admission Date/Time: 6/25/23  7:52 AM  Discharge Date/Time: No discharge date for patient encounter  Admitting Diagnosis Code/Description:  Hyponatremia [E87 1]  Pyelonephritis [N12]     Utilization Review Contact  Akbar Phillips Utilization   Phone: [phone]  Fax: [phone]  Email: [email or web address]  Contact for approvals/pending authorizations, clinical reviews, and discharge  Physician Advisory Services Contact  Medical Necessity Denial & [facility] Discussion  Phone: [phone]  Fax: [phone]  Email: [email or web address]

## 2023-06-27 NOTE — ASSESSMENT & PLAN NOTE
· Patient met sepsis criteria at time of presentation with tachycardia, leukocytosis, lactic acidosis  · Suspect secondary to obstructed and infected suprapubic catheter  · Urology consulted, suprapubic catheter exchanged with cloudy urine return  · CT with suprapubic catheter terminating in the bladder, moderate irregular bladder wall thickening with surrounding inflammatory changes suspicious for cystitis  · Does have evidence of scattered areas of heterogeneous enhancement in the upper and midportion of the left kidney suspicious for pyelonephritis  · Currently receiving IV Rocephin and IV ampicillin per infectious disease  · Blood cultures negative at 24 hours  · Urine culture growing Enterococcus faecalis and yeast, suspect yeast is a contaminant  · Recheck labs in a m

## 2023-06-27 NOTE — CASE MANAGEMENT
Case Management Discharge Planning Note    Patient name Cinthya Matias  Location East 5 /E5 MS 0-* MRN [id number]  : 1950 Date 2023       Current Admission Date: 2023  Current Admission Diagnosis:Sepsis [facility])   Patient Active Problem List    Diagnosis Date Noted   • Constipation 2023   • Chronic diastolic congestive heart failure ([address] ) 2023   • Hyponatremia 2023   • Acute metabolic encephalopathy    • Excessive daytime sleepiness 2022   • Shortness of breath 2022   • Head injury 2021   • Sepsis ([facility] [address] ) 2021   • Pancreatic mass 2020   • Pancreatic lesion 2020   • Bladder stones 2019   • Bladder neck contracture 2019   • History of CVA (cerebrovascular accident) 10/21/2018   • 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[facility] [address] ) 2017   • Diabetic neuropathy ([facility] [address] ) 2016   • Chronic suprapubic catheter ([facility] [address] ) 2016   • Cellulitis 2016   • Thyroid nodule 2015   • Cervical spinal stenosis 2014   • Generalized anxiety disorder 2014   • Confusion 2014   • Urinary retention 2014   • Obstructive sleep apnea 2013   • Benign colon polyp 2012   • Esophageal reflux 2012   • Fatty liver 2012   • Glaucoma 2012   • Hyperlipidemia 2012   • Multiple sclerosis ([facility] [address] ) 2012   • Diabetes mellitus ([facility] [address] ) 2012      LOS (days): 2  Geometric Mean LOS (GMLOS) (days): 7 00  Days to GMLOS:4 8     OBJECTIVE:  Risk of Unplanned Readmission Score: 20 5         Current admission status: Inpatient   Preferred Pharmacy:   [id number], [address] [facility] [address] [address]  Phone: [phone] Fax: [phone]    [facility] [address] [facility] [address]  Phone: [phone] Fax: [phone]    Primary Care Provider: Venice Simmonds, DO    Primary Insurance: [address] [place] [address] MEKHI  Secondary Insurance:     DISCHARGE DETAILS:    Additional Comments: BETO spoke with [employer]  Ada Joseph will evaluate patient upon hospital discharge for home health services  Patient updated of possible discharge date of tomrrow pending cultures  Patient agreeable with [facility] recommendations  CM also spoke with patient's brother about possible d/c date   CM will continue to follow patient through discharge

## 2023-06-27 NOTE — ASSESSMENT & PLAN NOTE
Lab Results   Component Value Date    HGBA1C 9 2 (H) 06/03/2023       Recent Labs     06/26/23  1115 06/26/23  1537 06/26/23  2103 06/27/23  0729   POCGLU 232* 190* 215* 004     · Hold Trulicity while inpatient  · Signs insulin coverage with Accu-Cheks  · Adjust pending glucose trends

## 2023-06-27 NOTE — PLAN OF CARE
Problem: MOBILITY - ADULT  Goal: Maintain or return to baseline ADL function  Description: INTERVENTIONS:  -  Assess patient's ability to carry out ADLs; assess patient's baseline for ADL function and identify physical deficits which impact ability to perform ADLs (bathing, care of mouth/teeth, toileting, grooming, dressing, etc )  - Assess/evaluate cause of self-care deficits   - Assess range of motion  - Assess patient's mobility; develop plan if impaired  - Assess patient's need for assistive devices and provide as appropriate  - Encourage maximum independence but intervene and supervise when necessary  - Involve family in performance of ADLs  - Assess for home care needs following discharge   - Consider OT consult to assist with ADL evaluation and planning for discharge  - Provide patient education as appropriate  Outcome: Progressing  Goal: Maintains/Returns to pre admission functional level  Description: INTERVENTIONS:  - Perform BMAT or MOVE assessment daily    - Set and communicate daily mobility goal to care team and patient/family/caregiver     - Collaborate with rehabilitation services on mobility goals if consulted  - Out of bed for toileting  - Record patient progress and toleration of activity level   Outcome: Progressing     Problem: PAIN - ADULT  Goal: Verbalizes/displays adequate comfort level or baseline comfort level  Description: Interventions:  - Encourage patient to monitor pain and request assistance  - Assess pain using appropriate pain scale  - Administer analgesics based on type and severity of pain and evaluate response  - Implement non-pharmacological measures as appropriate and evaluate response  - Consider cultural and social influences on pain and pain management  - Notify physician/advanced practitioner if interventions unsuccessful or patient reports new pain  Outcome: Progressing     Problem: INFECTION - ADULT  Goal: Absence or prevention of progression during hospitalization  Description: INTERVENTIONS:  - Assess and monitor for signs and symptoms of infection  - Monitor lab/diagnostic results  - Monitor all insertion sites, i e  indwelling lines, tubes, and drains  - Monitor endotracheal if appropriate and nasal secretions for changes in amount and color  - Remlap appropriate cooling/warming therapies per order  - Administer medications as ordered  - Instruct and encourage patient and family to use good hand hygiene technique  - Identify and instruct in appropriate isolation precautions for identified infection/condition  Outcome: Progressing     Problem: SAFETY ADULT  Goal: Maintain or return to baseline ADL function  Description: INTERVENTIONS:  -  Assess patient's ability to carry out ADLs; assess patient's baseline for ADL function and identify physical deficits which impact ability to perform ADLs (bathing, care of mouth/teeth, toileting, grooming, dressing, etc )  - Assess/evaluate cause of self-care deficits   - Assess range of motion  - Assess patient's mobility; develop plan if impaired  - Assess patient's need for assistive devices and provide as appropriate  - Encourage maximum independence but intervene and supervise when necessary  - Involve family in performance of ADLs  - Assess for home care needs following discharge   - Consider OT consult to assist with ADL evaluation and planning for discharge  - Provide patient education as appropriate  Outcome: Progressing  Goal: Maintains/Returns to pre admission functional level  Description: INTERVENTIONS:  - Perform BMAT or MOVE assessment daily    - Set and communicate daily mobility goal to care team and patient/family/caregiver     - Collaborate with rehabilitation services on mobility goals if consulted  - Out of bed for toileting  - Record patient progress and toleration of activity level   Outcome: Progressing  Goal: Patient will remain free of falls  Description: INTERVENTIONS:  - Educate patient/family on patient safety including physical limitations  - Instruct patient to call for assistance with activity   - Consult OT/PT to assist with strengthening/mobility   - Keep Call bell within reach  - Keep bed low and locked with side rails adjusted as appropriate  - Keep care items and personal belongings within reach  - Initiate and maintain comfort rounds  - Make Fall Risk Sign visible to staff  - Apply yellow socks and bracelet for high fall risk patients  - Consider moving patient to room near nurses station  Outcome: Progressing     Problem: DISCHARGE PLANNING  Goal: Discharge to home or other facility with appropriate resources  Description: INTERVENTIONS:  - Identify barriers to discharge w/patient and caregiver  - Arrange for needed discharge resources and transportation as appropriate  - Identify discharge learning needs (meds, wound care, etc )  - Arrange for interpretive services to assist at discharge as needed  - Refer to Case Management Department for coordinating discharge planning if the patient needs post-hospital services based on physician/advanced practitioner order or complex needs related to functional status, cognitive ability, or social support system  Outcome: Progressing     Problem: Knowledge Deficit  Goal: Patient/family/caregiver demonstrates understanding of disease process, treatment plan, medications, and discharge instructions  Description: Complete learning assessment and assess knowledge base    Interventions:  - Provide teaching at level of understanding  - Provide teaching via preferred learning methods  Outcome: Progressing     Problem: Prexisting or High Potential for Compromised Skin Integrity  Goal: Skin integrity is maintained or improved  Description: INTERVENTIONS:  - Identify patients at risk for skin breakdown  - Assess and monitor skin integrity  - Assess and monitor nutrition and hydration status  - Monitor labs   - Assess for incontinence   - Turn and reposition patient  - Assist with mobility/ambulation  - Relieve pressure over bony prominences  - Avoid friction and shearing  - Provide appropriate hygiene as needed including keeping skin clean and dry  - Evaluate need for skin moisturizer/barrier cream  - Collaborate with interdisciplinary team   - Patient/family teaching  - Consider wound care consult   Outcome: Progressing

## 2023-06-27 NOTE — QUICK NOTE
Updated brother Lyndsey Uribe over the phone regarding plan of care  Hopeful for discharge home tomorrow on PO abx  Did advise patient and his brother that he should connect his suprapubic tube to a drainage bag 24/7 rather than clamping the bag and emptying it 3-4 times daily as this may help prevent infection  Lyndsey Uribe reports he will encourage his brother to try this

## 2023-06-27 NOTE — ASSESSMENT & PLAN NOTE
· CT scan concerning for large amount of stool in the rectum measuring 11 6 x 8 0 x 7 1 cm in size with associated rectal wall thickening and perirectal inflammatory changes suspicious for fecal impaction and stercoral colitis  · Continue MiraLAX  · Continue bowel regimen

## 2023-06-28 VITALS
DIASTOLIC BLOOD PRESSURE: 63 MMHG | SYSTOLIC BLOOD PRESSURE: 133 MMHG | OXYGEN SATURATION: 92 % | HEIGHT: 64 IN | HEART RATE: 73 BPM | TEMPERATURE: 97.6 F | RESPIRATION RATE: 17 BRPM | BODY MASS INDEX: 28.27 KG/M2 | WEIGHT: 165.57 LBS

## 2023-06-28 LAB
ANION GAP SERPL CALCULATED.3IONS-SCNC: 9 MMOL/L
BUN SERPL-MCNC: 10 MG/DL (ref 5–25)
CALCIUM SERPL-MCNC: 8.9 MG/DL (ref 8.4–10.2)
CHLORIDE SERPL-SCNC: 104 MMOL/L (ref 96–108)
CO2 SERPL-SCNC: 23 MMOL/L (ref 21–32)
CREAT SERPL-MCNC: 0.88 MG/DL (ref 0.6–1.3)
ERYTHROCYTE [DISTWIDTH] IN BLOOD BY AUTOMATED COUNT: 13.7 % (ref 11.6–15.1)
GFR SERPL CREATININE-BSD FRML MDRD: 85 ML/MIN/1.73SQ M
GLUCOSE SERPL-MCNC: 154 MG/DL (ref 65–140)
GLUCOSE SERPL-MCNC: 155 MG/DL (ref 65–140)
GLUCOSE SERPL-MCNC: 216 MG/DL (ref 65–140)
HCT VFR BLD AUTO: 39.4 % (ref 36.5–49.3)
HGB BLD-MCNC: 12.9 G/DL (ref 12–17)
MCH RBC QN AUTO: 29.2 PG (ref 26.8–34.3)
MCHC RBC AUTO-ENTMCNC: 32.7 G/DL (ref 31.4–37.4)
MCV RBC AUTO: 89 FL (ref 82–98)
PLATELET # BLD AUTO: 279 THOUSANDS/UL (ref 149–390)
PMV BLD AUTO: 8.5 FL (ref 8.9–12.7)
POTASSIUM SERPL-SCNC: 3.9 MMOL/L (ref 3.5–5.3)
RBC # BLD AUTO: 4.42 MILLION/UL (ref 3.88–5.62)
SODIUM SERPL-SCNC: 136 MMOL/L (ref 135–147)
WBC # BLD AUTO: 10.18 THOUSAND/UL (ref 4.31–10.16)

## 2023-06-28 PROCEDURE — 99239 HOSP IP/OBS DSCHRG MGMT >30: CPT | Performed by: PHYSICIAN ASSISTANT

## 2023-06-28 PROCEDURE — 99232 SBSQ HOSP IP/OBS MODERATE 35: CPT | Performed by: INTERNAL MEDICINE

## 2023-06-28 PROCEDURE — 85027 COMPLETE CBC AUTOMATED: CPT | Performed by: PHYSICIAN ASSISTANT

## 2023-06-28 PROCEDURE — 80048 BASIC METABOLIC PNL TOTAL CA: CPT | Performed by: PHYSICIAN ASSISTANT

## 2023-06-28 PROCEDURE — 82948 REAGENT STRIP/BLOOD GLUCOSE: CPT

## 2023-06-28 RX ORDER — AMOXICILLIN 500 MG/1
500 CAPSULE ORAL EVERY 8 HOURS SCHEDULED
Qty: 18 CAPSULE | Refills: 0 | Status: SHIPPED | OUTPATIENT
Start: 2023-06-28 | End: 2023-07-04

## 2023-06-28 RX ADMIN — INSULIN LISPRO 4 UNITS: 100 INJECTION, SOLUTION INTRAVENOUS; SUBCUTANEOUS at 11:41

## 2023-06-28 RX ADMIN — GABAPENTIN 300 MG: 300 CAPSULE ORAL at 08:08

## 2023-06-28 RX ADMIN — INSULIN LISPRO 2 UNITS: 100 INJECTION, SOLUTION INTRAVENOUS; SUBCUTANEOUS at 08:08

## 2023-06-28 RX ADMIN — PANTOPRAZOLE SODIUM 40 MG: 40 TABLET, DELAYED RELEASE ORAL at 05:27

## 2023-06-28 RX ADMIN — CEFTRIAXONE 1000 MG: 1 INJECTION, SOLUTION INTRAVENOUS at 08:07

## 2023-06-28 RX ADMIN — AMPICILLIN SODIUM 1000 MG: 1 INJECTION, POWDER, FOR SOLUTION INTRAMUSCULAR; INTRAVENOUS at 09:12

## 2023-06-28 RX ADMIN — CLOPIDOGREL BISULFATE 75 MG: 75 TABLET ORAL at 08:08

## 2023-06-28 RX ADMIN — PROPRANOLOL HYDROCHLORIDE 60 MG: 60 CAPSULE, EXTENDED RELEASE ORAL at 08:08

## 2023-06-28 RX ADMIN — AMPICILLIN SODIUM 1000 MG: 1 INJECTION, POWDER, FOR SOLUTION INTRAMUSCULAR; INTRAVENOUS at 05:27

## 2023-06-28 RX ADMIN — ENOXAPARIN SODIUM 40 MG: 40 INJECTION SUBCUTANEOUS at 08:10

## 2023-06-28 RX ADMIN — NYSTATIN: 100000 POWDER TOPICAL at 08:08

## 2023-06-28 NOTE — PROGRESS NOTES
Progress Note - Infectious Disease   Chio Machado 67 y o  male MRN: [id number]  Unit/Bed#: E5 -01 Encounter: [id number]      Impression/Recommendations:  1  Sepsis, presenting with leukocytosis and tachycardia  Source of sepsis is most likely UTI  Patient is clinically much improved  Tachycardia has resolved  WBC decreased, near normal   Despite sepsis, he has remained systemically well, without toxicity and hemodynamically stable, without hypotension  Admission blood cultures have no growth thus far  Antibiotic plan as in below  Monitor temperature/WBC  Monitor hemodynamics  Follow-up on pending blood cultures      2   Probable UTI  Although growth in urine culture is usually bladder colonization in a patient with chronic SPC, given sepsis without other obvious source, will treat patient for presumptive UTI  Patient has had ampicillin susceptible Enterococcus growing in the last few urine cultures, not treated  Although he is partially improved with IV ceftriaxone, will modify antibiotic regimen to cover for Enterococcus  Yeast in urine culture is most likely bladder colonization secondary to multiple antibiotic courses  Improvement on antibiotic also suggest that he is his colonizer and not pathogen  Continue IV ampicillin  Monitor symptoms  Anticipate transition to p o  amoxicillin in a m , if patient continues to improve clinically  Treat x10 days total     3  Neurogenic bladder, with SPC in place  Patient is only getting SPC exchange every month  This is risk factor for recurrent infection  Urology follow-up      4   DM, poorly controlled, with hyperglycemia and elevated hemoglobin A1c  This is risk factor for recurrent infection also  Management per primary service      5   MS, with mild extremity weakness      6   Cephalexin allergy with rash  Patient is able to tolerate other cephalosporins  He is tolerating amoxicillin well    Monitor for cross allergic reaction      Discussed with patient in detail regarding the above plan  Discussed with [facility] service      Antibiotics:  Ampicillin # 3    Subjective:  Patient feels well, almost back to his normal self  No abdominal or flank pain  Temperature stays down  No chills  He is tolerating antibiotic well  No nausea, vomiting or diarrhea  Objective:  Vitals:  Temp:  [97 6 °F (36 4 °C)-98 2 °F (36 8 °C)] 97 6 °F (36 4 °C)  HR:  [73-82] 73  Resp:  [17-18] 17  BP: (111-137)/(48-63) 133/63  SpO2:  [92 %-96 %] 92 %  Temp (24hrs), Av °F (36 7 °C), Min:97 6 °F (36 4 °C), Max:98 2 °F (36 8 °C)  Current: Temperature: 97 6 °F (36 4 °C)    Physical Exam:     General: Awake, alert, cooperative, no distress  Neck:  Supple  No mass  No lymphadenopathy  Lungs: Expansion symmetric, no rales, no wheezing, respirations unlabored  Heart:  Regular rate and rhythm, S1 and S2 normal, no murmur  Abdomen: Soft, nondistended, non-tender, bowel sounds active all four quadrants, no masses, no organomegaly  Extremities: No edema  No erythema/warmth  No ulcer  Nontender to palpation  Skin:  No rash  Neuro: Moves all extremities  Invasive Devices     Drain  Duration           Suprapubic Catheter 20 Fr  3 days                Labs studies:   I have personally reviewed pertinent labs  Results from last 7 days   Lab Units 23  0453 23  0838 23  0332   POTASSIUM mmol/L 3 9 4 2 3 8   CHLORIDE mmol/L 104 105 96   CO2 mmol/L 23 23 22   BUN mg/dL 10 7 12   CREATININE mg/dL 0 88 0 81 0 91   EGFR ml/min/1 73sq m 85 88 83   CALCIUM mg/dL 8 9 8 9 9 7     Results from last 7 days   Lab Units 23  0453 23  0838 23  0332   WBC Thousand/uL 10 18* 12 19* 16 75*   HEMOGLOBIN g/dL 12 9 13 2 14 4   PLATELETS Thousands/uL 279 277 342     Results from last 7 days   Lab Units 23  0738 23  0642   BLOOD CULTURE  No Growth at 72 hrs    No Growth at 72 hrs   --    URINE CULTURE   --  >100,000 cfu/ml Candida albicans*  60,000-69,000 cfu/ml Enterococcus faecalis*       Imaging Studies:   I have personally reviewed pertinent imaging study reports and images in PACS  EKG, Pathology, and Other Studies:   I have personally reviewed pertinent reports

## 2023-06-28 NOTE — ASSESSMENT & PLAN NOTE
· Presenting with sepsis criteria - tachycardia, leukocytosis, lactic acidosis  · Suspect secondary to obstructed and infected suprapubic catheter  · Seen by urology - suprapubic catheter exchanged with cloudy urine return  · CT with suprapubic catheter terminating in the bladder, moderate irregular bladder wall thickening with surrounding inflammatory changes suspicious for cystitis   Evidence of scattered areas of heterogeneous enhancement in the upper and midportion of the left kidney suspicious for pyelonephritis  · Currently receiving IV Rocephin and IV ampicillin per infectious disease  · Blood cultures negative at 48 hours  · Urine culture growing Enterococcus faecalis and yeast, suspect yeast is a contaminant  · Per ID okay to transition to oral amoxicillin to treat a total of 10 days

## 2023-06-28 NOTE — DISCHARGE SUMMARY
[address] [facility]  Discharge- [facility] 1950, 67 y o  male MRN: [id number]  Unit/Bed#: E5 -01 Encounter: [id number]  Primary Care Provider: Reji Villarreal DO   Date and time admitted to hospital: 6/25/2023  2:55 AM    * Sepsis [facility])  Assessment & Plan  · Presenting with sepsis criteria - tachycardia, leukocytosis, lactic acidosis  · Suspect secondary to obstructed and infected suprapubic catheter  · Seen by urology - suprapubic catheter exchanged with cloudy urine return  · CT with suprapubic catheter terminating in the bladder, moderate irregular bladder wall thickening with surrounding inflammatory changes suspicious for cystitis   Evidence of scattered areas of heterogeneous enhancement in the upper and midportion of the left kidney suspicious for pyelonephritis  · Received IV Rocephin and IV ampicillin per infectious disease  · Blood cultures negative at 48 hours  · Urine culture growing Enterococcus faecalis and yeast, suspect yeast is a contaminant  · Per ID okay to transition to oral amoxicillin to treat a total of 10 days    Chronic suprapubic catheter [facility])  Assessment & Plan  · Chronic suprapubic catheter in place for neurogenic bladder  · Exchanged by urology on day of admission 6/25/23  · Instructed to NOT clamp suprapubic tube - he was instructed to use leg bag for continual drainage    Constipation  Assessment & Plan  · CT scan concerning for large amount of stool in the rectum measuring 11 6 x 8 0 x 7 1 cm in size with associated rectal wall thickening and perirectal inflammatory changes suspicious for fecal impaction and stercoral colitis  · Continue bowel regimen with MiraLAX  · Patient last had BM today    Hyponatremia  Assessment & Plan  · Sodium 129 at time of admission  · Hydrated with IV fluids  · Improved to 133     History of CVA (cerebrovascular accident)  Assessment & Plan  · History of CVA in 2018  · Continue Plavix and Lipitor    Diabetes mellitus [facility])  Assessment & Plan  Lab Results   Component Value Date    HGBA1C 9 2 (H) 06/03/2023     Recent Labs     06/27/23  1612 06/27/23  2122 06/28/23  0707 06/28/23  1126   POCGLU 184* 156* 155* 482*   · Held Trulicity while inpatient  · Sliding scale insulin coverage with Accu-Cheks while inpatient    Multiple sclerosis ([facility] [address] )  Assessment & Plan  · History of longstanding MS  · Nonambulatory at baseline  · Lives with brother and sister at home who help care for him  · Continue gabapentin  · Neurology follow-up    Esophageal reflux  Assessment & Plan  · Continue daily PPI      Consultations During Hospital Stay:  · Urology  · Infectious disease    Procedures Performed:   XR chest 2 views  Result Date: 6/25/2023  Impression: No acute cardiopulmonary disease  Workstation performed: [id number]     CT chest abdomen pelvis w contrast  Result Date: 6/25/2023  · Impression: Suprapubic catheter terminates within the bladder  Moderate irregular bladder wall thickening with surrounding inflammatory changes, highly suspicious for cystitis  Scattered areas of heterogeneous enhancement in the upper and midportion of the left kidney suspicious for left-sided pyelonephritis  Large amount of stool in the rectum measuring approximately 11 6 x 8 0 x 7 1 cm in size with associated rectal wall thickening and perirectal inflammatory changes, suspicious for fecal impaction and associated stercoral colitis  Mild scattered pulmonary parenchymal and paraseptal emphysematous changes, moderate coronary atherosclerosis, left renal cyst, small hiatal hernia, and other findings as above   Above findings communicated to Dr Anastacio Isabel on 6/25/2023 7:06 AM  Workstation performed: [id number]     Significant Findings / Test Results:   · Sepsis POA  · Obstructed suprapubic catheter - catheter exchanged 6/25  · UTI in the setting of chronic suprapubic catheter    Incidental Findings:   · None    Test Results Pending at Discharge (will require follow up): "  · None      Outpatient Followup Requested:  · PCP- 1 week  · Urology - routine SPT exchanges - done through [facility]    Complications:  none    Hospital Course:     Daniel Beckham is a 67 y o  male patient who originally presented to the hospital on 6/25/2023 due to weakness as well as decreased output from his chronic suprapubic catheter  Upon evaluation in the ED he was found to be septic with tachycardia, leukocytosis, lactic acidosis  He was initiated on ceftriaxone for a complicated urinary tract infection  He was seen in consultation by urology who exchange chronic suprapubic catheter on 6/25/2023  He was continued on IV antibiotics and seen in consultation by infectious disease to help guide antibiotic therapy  His antibiotics were adjusted based off urinary cultures- continued on ceftriaxone and was started on IV ampicillin to cover Enterococcus  Yeast growing in culture was suspected to be a contaminant and no antifungal was needed  Patient was instructed to have ongoing outpatient urology follow-up along with suprapubic catheter changes every month  He was also instructed to not clamp his catheter  He was told to utilize a leg bag and change frequently to hopefully prevent future infections  In conclusion, patient is back to his baseline and he is stable for discharge at this time  Condition at Discharge: stable     Discharge Day Visit / Exam:     Subjective:  Resting in bed  Feels good  No acute complaints  Discussed need to keep catheter hooked up to leg bag and empty frequently through out the day  Vitals: Blood Pressure: 133/63 (06/28/23 0708)  Pulse: 73 (06/28/23 0708)  Temperature: 97 6 °F (36 4 °C) (06/28/23 0708)  Temp Source: Oral (06/28/23 0708)  Respirations: 17 (06/28/23 0708)  Height: 5' 4\" (162 6 cm) (06/25/23 0845)  Weight - Scale: 75 1 kg (165 lb 9 1 oz) (06/25/23 0845)  SpO2: 92 % (06/28/23 0708)     Exam:   Physical Exam  Vitals and nursing note reviewed   " Constitutional:       General: He is not in acute distress  Appearance: Normal appearance  He is normal weight  He is not ill-appearing, toxic-appearing or diaphoretic  HENT:      Head: Normocephalic and atraumatic  Eyes:      General: No scleral icterus  Pulmonary:      Effort: Pulmonary effort is normal  No respiratory distress  Breath sounds: Normal breath sounds  No stridor  No wheezing or rhonchi  Abdominal:      General: Bowel sounds are normal  There is no distension  Palpations: Abdomen is soft  There is no mass  Tenderness: There is no abdominal tenderness  Hernia: No hernia is present  Genitourinary:     Comments: Suprapubic catheter in place draining yellow urine  Musculoskeletal:         General: No swelling  Cervical back: Neck supple  Skin:     General: Skin is warm and dry  Neurological:      Mental Status: He is alert and oriented to person, place, and time  Mental status is at baseline  Psychiatric:         Mood and Affect: Mood normal          Behavior: Behavior normal        Discussion with Family: nephew via telephone    Discharge instructions/Information to patient and family:   See after visit summary for information provided to patient and family  Provisions for Follow-Up Care:  See after visit summary for information related to follow-up care and any pertinent home health orders  Planned Readmission: no     Discharge Statement:  I spent 55 minutes discharging the patient  This time was spent on the day of discharge  I had direct contact with the patient on the day of discharge  Greater than 50% of the total time was spent examining patient, answering all patient questions, arranging and discussing plan of care with patient as well as directly providing post-discharge instructions  Additional time then spent on discharge activities      Discharge Medications:  See after visit summary for reconciled discharge medications provided to patient and family        ** Please Note: This note has been constructed using a voice recognition system **

## 2023-06-28 NOTE — ASSESSMENT & PLAN NOTE
· CT scan concerning for large amount of stool in the rectum measuring 11 6 x 8 0 x 7 1 cm in size with associated rectal wall thickening and perirectal inflammatory changes suspicious for fecal impaction and stercoral colitis  · Continue bowel regimen with MiraLAX  · Patient last had BM today

## 2023-06-28 NOTE — PLAN OF CARE
Problem: MOBILITY - ADULT  Goal: Maintain or return to baseline ADL function  Description: INTERVENTIONS:  -  Assess patient's ability to carry out ADLs; assess patient's baseline for ADL function and identify physical deficits which impact ability to perform ADLs (bathing, care of mouth/teeth, toileting, grooming, dressing, etc )  - Assess/evaluate cause of self-care deficits   - Assess range of motion  - Assess patient's mobility; develop plan if impaired  - Assess patient's need for assistive devices and provide as appropriate  - Encourage maximum independence but intervene and supervise when necessary  - Involve family in performance of ADLs  - Assess for home care needs following discharge   - Consider OT consult to assist with ADL evaluation and planning for discharge  - Provide patient education as appropriate  Outcome: Progressing  Goal: Maintains/Returns to pre admission functional level  Description: INTERVENTIONS:  - Perform BMAT or MOVE assessment daily    - Set and communicate daily mobility goal to care team and patient/family/caregiver     - Collaborate with rehabilitation services on mobility goals if consulted  - Out of bed for toileting  - Record patient progress and toleration of activity level   Outcome: Progressing     Problem: PAIN - ADULT  Goal: Verbalizes/displays adequate comfort level or baseline comfort level  Description: Interventions:  - Encourage patient to monitor pain and request assistance  - Assess pain using appropriate pain scale  - Administer analgesics based on type and severity of pain and evaluate response  - Implement non-pharmacological measures as appropriate and evaluate response  - Consider cultural and social influences on pain and pain management  - Notify physician/advanced practitioner if interventions unsuccessful or patient reports new pain  Outcome: Progressing     Problem: INFECTION - ADULT  Goal: Absence or prevention of progression during hospitalization  Description: INTERVENTIONS:  - Assess and monitor for signs and symptoms of infection  - Monitor lab/diagnostic results  - Monitor all insertion sites, i e  indwelling lines, tubes, and drains  - Monitor endotracheal if appropriate and nasal secretions for changes in amount and color  - [place] appropriate cooling/warming therapies per order  - Administer medications as ordered  - Instruct and encourage patient and family to use good hand hygiene technique  - Identify and instruct in appropriate isolation precautions for identified infection/condition  Outcome: Progressing     Problem: SAFETY ADULT  Goal: Maintain or return to baseline ADL function  Description: INTERVENTIONS:  -  Assess patient's ability to carry out ADLs; assess patient's baseline for ADL function and identify physical deficits which impact ability to perform ADLs (bathing, care of mouth/teeth, toileting, grooming, dressing, etc )  - Assess/evaluate cause of self-care deficits   - Assess range of motion  - Assess patient's mobility; develop plan if impaired  - Assess patient's need for assistive devices and provide as appropriate  - Encourage maximum independence but intervene and supervise when necessary  - Involve family in performance of ADLs  - Assess for home care needs following discharge   - Consider OT consult to assist with ADL evaluation and planning for discharge  - Provide patient education as appropriate  Outcome: Progressing  Goal: Maintains/Returns to pre admission functional level  Description: INTERVENTIONS:  - Perform BMAT or MOVE assessment daily    - Set and communicate daily mobility goal to care team and patient/family/caregiver     - Collaborate with rehabilitation services on mobility goals if consulted  - Out of bed for toileting  - Record patient progress and toleration of activity level   Outcome: Progressing  Goal: Patient will remain free of falls  Description: INTERVENTIONS:  - Educate patient/family on patient safety including physical limitations  - Instruct patient to call for assistance with activity   - Consult OT/PT to assist with strengthening/mobility   - Keep Call bell within reach  - Keep bed low and locked with side rails adjusted as appropriate  - Keep care items and personal belongings within reach  - Initiate and maintain comfort rounds  - Make Fall Risk Sign visible to staff  - Apply yellow socks and bracelet for high fall risk patients  - Consider moving patient to room near nurses station  Outcome: Progressing     Problem: DISCHARGE PLANNING  Goal: Discharge to home or other facility with appropriate resources  Description: INTERVENTIONS:  - Identify barriers to discharge w/patient and caregiver  - Arrange for needed discharge resources and transportation as appropriate  - Identify discharge learning needs (meds, wound care, etc )  - Arrange for interpretive services to assist at discharge as needed  - Refer to Case Management Department for coordinating discharge planning if the patient needs post-hospital services based on physician/advanced practitioner order or complex needs related to functional status, cognitive ability, or social support system  Outcome: Progressing     Problem: Knowledge Deficit  Goal: Patient/family/caregiver demonstrates understanding of disease process, treatment plan, medications, and discharge instructions  Description: Complete learning assessment and assess knowledge base    Interventions:  - Provide teaching at level of understanding  - Provide teaching via preferred learning methods  Outcome: Progressing     Problem: Prexisting or High Potential for Compromised Skin Integrity  Goal: Skin integrity is maintained or improved  Description: INTERVENTIONS:  - Identify patients at risk for skin breakdown  - Assess and monitor skin integrity  - Assess and monitor nutrition and hydration status  - Monitor labs   - Assess for incontinence   - Turn and reposition patient  - Assist with mobility/ambulation  - Relieve pressure over bony prominences  - Avoid friction and shearing  - Provide appropriate hygiene as needed including keeping skin clean and dry  - Evaluate need for skin moisturizer/barrier cream  - Collaborate with interdisciplinary team   - Patient/family teaching  - Consider wound care consult   Outcome: Progressing

## 2023-06-28 NOTE — ASSESSMENT & PLAN NOTE
· History of longstanding MS  · Nonambulatory at baseline  · Lives with brother and sister at home who help care for him  · Continue gabapentin  · Neurology follow-up

## 2023-06-28 NOTE — ASSESSMENT & PLAN NOTE
· Chronic suprapubic catheter in place for neurogenic bladder  · Exchanged by urology on day of admission 6/25/23  · Instructed to NOT clamp suprapubic tube - he was instructed to use leg bag for continual drainage

## 2023-06-28 NOTE — ASSESSMENT & PLAN NOTE
Lab Results   Component Value Date    HGBA1C 9 2 (H) 06/03/2023     Recent Labs     06/27/23  1612 06/27/23  2122 06/28/23  0707 06/28/23  1126   POCGLU 184* 156* 155* 044*   · Held Trulicity while inpatient  · Sliding scale insulin coverage with Accu-Cheks while inpatient

## 2023-06-28 NOTE — CASE MANAGEMENT
Case Management Discharge Planning Note    Patient name Della Pearson  Location [facility] 5 /E5 MS 0-* MRN [id number]  : 1950 Date 2023       Current Admission Date: 2023  Current Admission Diagnosis:Sepsis [facility])   Patient Active Problem List    Diagnosis Date Noted   • Constipation 2023   • Chronic diastolic congestive heart failure ([address] ) 2023   • Hyponatremia 2023   • Acute metabolic encephalopathy    • Excessive daytime sleepiness 2022   • Shortness of breath 2022   • Head injury 2021   • Sepsis ([facility] ) 2021   • Pancreatic mass 2020   • Pancreatic lesion 2020   • Bladder stones 2019   • Bladder neck contracture 2019   • History of CVA (cerebrovascular accident) 10/21/2018   • 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   • Diabetic neuropathy ([facility] ) 2016   • Chronic suprapubic catheter ([facility] ) 2016   • Cellulitis 2016   • Thyroid nodule 2015   • Cervical spinal stenosis 2014   • Generalized anxiety disorder 2014   • Confusion 2014   • Urinary retention 2014   • Obstructive sleep apnea 2013   • Benign colon polyp 2012   • Esophageal reflux 2012   • Fatty liver 2012   • Glaucoma 2012   • Hyperlipidemia 2012   • Multiple sclerosis ([facility] ) 2012   • Diabetes mellitus ([facility] ) 2012      LOS (days): 3  Geometric Mean LOS (GMLOS) (days): 7 00  Days to GMLOS:3 8     OBJECTIVE:  Risk of Unplanned Readmission Score: 22 17         Current admission status: Inpatient   Preferred Pharmacy:   [id number] Ian Castaneda [address] [address]  Phone: [phone] Fax: [phone]    [address] [place] [address] Dr, [facility] [address]  Phone: [phone] Fax: [phone]    Primary Care Provider: Blanca Card DO    Primary Insurance: [facility]KHI  Secondary Insurance:     DISCHARGE DETAILS:    Discharge planning discussed with[de-identified] Patient and Nephew  Freedom of Choice: Yes  Comments - Freedom of Choice: patient to discharge home today  CM contacted family/caregiver?: Yes  Were Treatment Team discharge recommendations reviewed with patient/caregiver?: Yes  Did patient/caregiver verbalize understanding of patient care needs?: Yes  Were patient/caregiver advised of the risks associated with not following Treatment Team discharge recommendations?: Yes    Treatment Team Recommendation: Home  Discharge Destination Plan[de-identified] Home  Transport at Discharge : [facility]  Dispatcher Contacted: Yes  Number/Name of Dispatcher: round trip  Transported by [employer] and Unit #): [employer]  ETA of Transport (Date): 06/28/23  ETA of Transport (Time): 9122     Transfer Mode: Wheelchair  Accompanied by: EMS personnel    Additional Comments: CM spoke with patient and nephew  Nephew will be at home to receive the patient  Discharge plan for today via wheelchair van   CM will continue to follow through discharge

## 2023-06-28 NOTE — PLAN OF CARE
Problem: MOBILITY - ADULT  Goal: Maintain or return to baseline ADL function  Description: INTERVENTIONS:  -  Assess patient's ability to carry out ADLs; assess patient's baseline for ADL function and identify physical deficits which impact ability to perform ADLs (bathing, care of mouth/teeth, toileting, grooming, dressing, etc )  - Assess/evaluate cause of self-care deficits   - Assess range of motion  - Assess patient's mobility; develop plan if impaired  - Assess patient's need for assistive devices and provide as appropriate  - Encourage maximum independence but intervene and supervise when necessary  - Involve family in performance of ADLs  - Assess for home care needs following discharge   - Consider OT consult to assist with ADL evaluation and planning for discharge  - Provide patient education as appropriate  Outcome: Progressing  Goal: Maintains/Returns to pre admission functional level  Description: INTERVENTIONS:  - Perform BMAT or MOVE assessment daily    - Set and communicate daily mobility goal to care team and patient/family/caregiver     - Collaborate with rehabilitation services on mobility goals if consulted  - Out of bed for toileting  - Record patient progress and toleration of activity level   Outcome: Progressing     Problem: PAIN - ADULT  Goal: Verbalizes/displays adequate comfort level or baseline comfort level  Description: Interventions:  - Encourage patient to monitor pain and request assistance  - Assess pain using appropriate pain scale  - Administer analgesics based on type and severity of pain and evaluate response  - Implement non-pharmacological measures as appropriate and evaluate response  - Consider cultural and social influences on pain and pain management  - Notify physician/advanced practitioner if interventions unsuccessful or patient reports new pain  Outcome: Progressing     Problem: INFECTION - ADULT  Goal: Absence or prevention of progression during hospitalization  Description: INTERVENTIONS:  - Assess and monitor for signs and symptoms of infection  - Monitor lab/diagnostic results  - Monitor all insertion sites, i e  indwelling lines, tubes, and drains  - Monitor endotracheal if appropriate and nasal secretions for changes in amount and color  - [place] appropriate cooling/warming therapies per order  - Administer medications as ordered  - Instruct and encourage patient and family to use good hand hygiene technique  - Identify and instruct in appropriate isolation precautions for identified infection/condition  Outcome: Progressing     Problem: SAFETY ADULT  Goal: Maintain or return to baseline ADL function  Description: INTERVENTIONS:  -  Assess patient's ability to carry out ADLs; assess patient's baseline for ADL function and identify physical deficits which impact ability to perform ADLs (bathing, care of mouth/teeth, toileting, grooming, dressing, etc )  - Assess/evaluate cause of self-care deficits   - Assess range of motion  - Assess patient's mobility; develop plan if impaired  - Assess patient's need for assistive devices and provide as appropriate  - Encourage maximum independence but intervene and supervise when necessary  - Involve family in performance of ADLs  - Assess for home care needs following discharge   - Consider OT consult to assist with ADL evaluation and planning for discharge  - Provide patient education as appropriate  Outcome: Progressing  Goal: Maintains/Returns to pre admission functional level  Description: INTERVENTIONS:  - Perform BMAT or MOVE assessment daily    - Set and communicate daily mobility goal to care team and patient/family/caregiver     - Collaborate with rehabilitation services on mobility goals if consulted  - Out of bed for toileting  - Record patient progress and toleration of activity level   Outcome: Progressing  Goal: Patient will remain free of falls  Description: INTERVENTIONS:  - Educate patient/family on patient safety including physical limitations  - Instruct patient to call for assistance with activity   - Consult OT/PT to assist with strengthening/mobility   - Keep Call bell within reach  - Keep bed low and locked with side rails adjusted as appropriate  - Keep care items and personal belongings within reach  - Initiate and maintain comfort rounds  - Make Fall Risk Sign visible to staff  - Apply yellow socks and bracelet for high fall risk patients  - Consider moving patient to room near nurses station  Outcome: Progressing     Problem: DISCHARGE PLANNING  Goal: Discharge to home or other facility with appropriate resources  Description: INTERVENTIONS:  - Identify barriers to discharge w/patient and caregiver  - Arrange for needed discharge resources and transportation as appropriate  - Identify discharge learning needs (meds, wound care, etc )  - Arrange for interpretive services to assist at discharge as needed  - Refer to Case Management Department for coordinating discharge planning if the patient needs post-hospital services based on physician/advanced practitioner order or complex needs related to functional status, cognitive ability, or social support system  Outcome: Progressing     Problem: Knowledge Deficit  Goal: Patient/family/caregiver demonstrates understanding of disease process, treatment plan, medications, and discharge instructions  Description: Complete learning assessment and assess knowledge base    Interventions:  - Provide teaching at level of understanding  - Provide teaching via preferred learning methods  Outcome: Progressing     Problem: Prexisting or High Potential for Compromised Skin Integrity  Goal: Skin integrity is maintained or improved  Description: INTERVENTIONS:  - Identify patients at risk for skin breakdown  - Assess and monitor skin integrity  - Assess and monitor nutrition and hydration status  - Monitor labs   - Assess for incontinence   - Turn and reposition patient  - Assist with mobility/ambulation  - Relieve pressure over bony prominences  - Avoid friction and shearing  - Provide appropriate hygiene as needed including keeping skin clean and dry  - Evaluate need for skin moisturizer/barrier cream  - Collaborate with interdisciplinary team   - Patient/family teaching  - Consider wound care consult   Outcome: Progressing

## 2023-06-29 ENCOUNTER — HOSPITAL ENCOUNTER (EMERGENCY)
Facility: HOSPITAL | Age: 73
Discharge: HOME | End: 2023-06-30
Attending: EMERGENCY MEDICINE
Payer: MEDICARE

## 2023-06-29 ENCOUNTER — APPOINTMENT (EMERGENCY)
Dept: RADIOLOGY | Facility: HOSPITAL | Age: 73
End: 2023-06-29
Payer: MEDICARE

## 2023-06-29 DIAGNOSIS — R06.00 DYSPNEA: Primary | ICD-10-CM

## 2023-06-29 LAB
ALBUMIN SERPL BCP-MCNC: 4.1 G/DL (ref 3.5–5)
ALP SERPL-CCNC: 109 U/L (ref 34–104)
ALT SERPL W P-5'-P-CCNC: 24 U/L (ref 7–52)
ANION GAP SERPL CALCULATED.3IONS-SCNC: 9 MMOL/L
APTT PPP: 25 SECONDS (ref 23–37)
AST SERPL W P-5'-P-CCNC: 20 U/L (ref 13–39)
BACTERIA UR QL AUTO: ABNORMAL /HPF
BASE EX.OXY STD BLDV CALC-SCNC: 45.4 % (ref 60–80)
BASE EXCESS BLDV CALC-SCNC: 1.3 MMOL/L
BASOPHILS # BLD AUTO: 0.09 THOUSANDS/ÂΜL (ref 0–0.1)
BASOPHILS NFR BLD AUTO: 1 % (ref 0–1)
BILIRUB SERPL-MCNC: 0.46 MG/DL (ref 0.2–1)
BILIRUB UR QL STRIP: NEGATIVE
BNP SERPL-MCNC: 34 PG/ML (ref 0–100)
BUDDING YEAST: PRESENT
BUN SERPL-MCNC: 12 MG/DL (ref 5–25)
CALCIUM SERPL-MCNC: 10.8 MG/DL (ref 8.4–10.2)
CARDIAC TROPONIN I PNL SERPL HS: 3 NG/L
CHLORIDE SERPL-SCNC: 97 MMOL/L (ref 96–108)
CLARITY UR: CLEAR
CO2 SERPL-SCNC: 27 MMOL/L (ref 21–32)
COLOR UR: COLORLESS
CREAT SERPL-MCNC: 0.82 MG/DL (ref 0.6–1.3)
D DIMER PPP FEU-MCNC: 0.57 UG/ML FEU
EOSINOPHIL # BLD AUTO: 0.59 THOUSAND/ÂΜL (ref 0–0.61)
EOSINOPHIL NFR BLD AUTO: 5 % (ref 0–6)
ERYTHROCYTE [DISTWIDTH] IN BLOOD BY AUTOMATED COUNT: 13.4 % (ref 11.6–15.1)
GFR SERPL CREATININE-BSD FRML MDRD: 87 ML/MIN/1.73SQ M
GLUCOSE SERPL-MCNC: 202 MG/DL (ref 65–140)
GLUCOSE UR STRIP-MCNC: ABNORMAL MG/DL
HCO3 BLDV-SCNC: 27.2 MMOL/L (ref 24–30)
HCT VFR BLD AUTO: 44.8 % (ref 36.5–49.3)
HGB BLD-MCNC: 14.9 G/DL (ref 12–17)
HGB UR QL STRIP.AUTO: NEGATIVE
IMM GRANULOCYTES # BLD AUTO: 0.05 THOUSAND/UL (ref 0–0.2)
IMM GRANULOCYTES NFR BLD AUTO: 0 % (ref 0–2)
INR PPP: 0.84 (ref 0.84–1.19)
KETONES UR STRIP-MCNC: ABNORMAL MG/DL
LACTATE SERPL-SCNC: 2.8 MMOL/L (ref 0.5–2)
LEUKOCYTE ESTERASE UR QL STRIP: ABNORMAL
LYMPHOCYTES # BLD AUTO: 2.77 THOUSANDS/ÂΜL (ref 0.6–4.47)
LYMPHOCYTES NFR BLD AUTO: 23 % (ref 14–44)
MCH RBC QN AUTO: 29 PG (ref 26.8–34.3)
MCHC RBC AUTO-ENTMCNC: 33.3 G/DL (ref 31.4–37.4)
MCV RBC AUTO: 87 FL (ref 82–98)
MONOCYTES # BLD AUTO: 0.69 THOUSAND/ÂΜL (ref 0.17–1.22)
MONOCYTES NFR BLD AUTO: 6 % (ref 4–12)
NEUTROPHILS # BLD AUTO: 7.78 THOUSANDS/ÂΜL (ref 1.85–7.62)
NEUTS SEG NFR BLD AUTO: 65 % (ref 43–75)
NITRITE UR QL STRIP: NEGATIVE
NON-SQ EPI CELLS URNS QL MICRO: ABNORMAL /HPF
NRBC BLD AUTO-RTO: 0 /100 WBCS
O2 CT BLDV-SCNC: 10 ML/DL
PCO2 BLDV: 47.4 MM HG (ref 42–50)
PH BLDV: 7.38 [PH] (ref 7.3–7.4)
PH UR STRIP.AUTO: 8 [PH]
PLATELET # BLD AUTO: 354 THOUSANDS/UL (ref 149–390)
PMV BLD AUTO: 8.3 FL (ref 8.9–12.7)
PO2 BLDV: 26.7 MM HG (ref 35–45)
POTASSIUM SERPL-SCNC: 4.2 MMOL/L (ref 3.5–5.3)
PROCALCITONIN SERPL-MCNC: 0.05 NG/ML
PROT SERPL-MCNC: 7.8 G/DL (ref 6.4–8.4)
PROT UR STRIP-MCNC: NEGATIVE MG/DL
PROTHROMBIN TIME: 11.5 SECONDS (ref 11.6–14.5)
RBC # BLD AUTO: 5.13 MILLION/UL (ref 3.88–5.62)
RBC #/AREA URNS AUTO: ABNORMAL /HPF
SODIUM SERPL-SCNC: 133 MMOL/L (ref 135–147)
SP GR UR STRIP.AUTO: 1.01 (ref 1–1.03)
UROBILINOGEN UR STRIP-ACNC: <2 MG/DL
WBC # BLD AUTO: 11.97 THOUSAND/UL (ref 4.31–10.16)
WBC #/AREA URNS AUTO: ABNORMAL /HPF

## 2023-06-29 PROCEDURE — 81001 URINALYSIS AUTO W/SCOPE: CPT

## 2023-06-29 PROCEDURE — 83880 ASSAY OF NATRIURETIC PEPTIDE: CPT

## 2023-06-29 PROCEDURE — 83605 ASSAY OF LACTIC ACID: CPT

## 2023-06-29 PROCEDURE — 84484 ASSAY OF TROPONIN QUANT: CPT

## 2023-06-29 PROCEDURE — 85730 THROMBOPLASTIN TIME PARTIAL: CPT

## 2023-06-29 PROCEDURE — 84145 PROCALCITONIN (PCT): CPT

## 2023-06-29 PROCEDURE — 85610 PROTHROMBIN TIME: CPT

## 2023-06-29 PROCEDURE — 85025 COMPLETE CBC W/AUTO DIFF WBC: CPT

## 2023-06-29 PROCEDURE — 87106 FUNGI IDENTIFICATION YEAST: CPT

## 2023-06-29 PROCEDURE — 0241U HB NFCT DS VIR RESP RNA 4 TRGT: CPT

## 2023-06-29 PROCEDURE — 71045 X-RAY EXAM CHEST 1 VIEW: CPT

## 2023-06-29 PROCEDURE — 85379 FIBRIN DEGRADATION QUANT: CPT

## 2023-06-29 PROCEDURE — 80053 COMPREHEN METABOLIC PANEL: CPT

## 2023-06-29 PROCEDURE — 87040 BLOOD CULTURE FOR BACTERIA: CPT

## 2023-06-29 PROCEDURE — 36415 COLL VENOUS BLD VENIPUNCTURE: CPT

## 2023-06-29 PROCEDURE — 93005 ELECTROCARDIOGRAM TRACING: CPT

## 2023-06-29 PROCEDURE — 87086 URINE CULTURE/COLONY COUNT: CPT

## 2023-06-29 PROCEDURE — 82805 BLOOD GASES W/O2 SATURATION: CPT

## 2023-06-29 RX ORDER — ONDANSETRON 2 MG/ML
4 INJECTION INTRAMUSCULAR; INTRAVENOUS ONCE
Status: COMPLETED | OUTPATIENT
Start: 2023-06-29 | End: 2023-06-29

## 2023-06-29 RX ORDER — ALBUTEROL SULFATE 2.5 MG/3ML
2.5 SOLUTION RESPIRATORY (INHALATION) ONCE
Status: COMPLETED | OUTPATIENT
Start: 2023-06-29 | End: 2023-06-29

## 2023-06-29 RX ADMIN — SODIUM CHLORIDE 1000 ML: 0.9 INJECTION, SOLUTION INTRAVENOUS at 23:16

## 2023-06-29 RX ADMIN — ONDANSETRON 4 MG: 2 INJECTION INTRAMUSCULAR; INTRAVENOUS at 23:11

## 2023-06-29 RX ADMIN — ALBUTEROL SULFATE 2.5 MG: 2.5 SOLUTION RESPIRATORY (INHALATION) at 23:25

## 2023-06-29 NOTE — UTILIZATION REVIEW
NOTIFICATION OF ADMISSION DISCHARGE   This is a Notification of Discharge from [address]  Please be advised that this patient has been discharge from our facility  Below you will find the admission and discharge date and time including the patient’s disposition  UTILIZATION REVIEW CONTACT:  Guilherme [employer]  Utilization   Network Utilization Review Department  Phone: [phone] x carefully listen to the prompts  All voicemails are confidential   Email: [email or web address]     ADMISSION INFORMATION  PRESENTATION DATE: 6/25/2023  2:55 AM  OBERVATION ADMISSION DATE:   INPATIENT ADMISSION DATE: 6/25/23  7:52 AM   DISCHARGE DATE: 6/28/2023  3:45 PM   DISPOSITION:Home/Self Care    IMPORTANT INFORMATION:  Send all requests for admission clinical reviews, approved or denied determinations and any other requests to dedicated fax number below belonging to the campus where the patient is receiving treatment   List of dedicated fax numbers:  [address] DENIALS (Administrative/Medical Necessity) [phone]   [address] (Maternity/NICU/Pediatrics) [phone]   [facility] [phone]   [facility] [phone]   [address] [phone]   220 [facility] [phone] 90 [facility] [phone]   [address] [place] [address] 119 [phone]   [facility]  [phone]   [address] [facility] [phone]   412 [facility] [address] [facility] [phone]

## 2023-06-30 ENCOUNTER — APPOINTMENT (EMERGENCY)
Dept: RADIOLOGY | Facility: HOSPITAL | Age: 73
End: 2023-06-30
Payer: MEDICARE

## 2023-06-30 VITALS
RESPIRATION RATE: 21 BRPM | DIASTOLIC BLOOD PRESSURE: 76 MMHG | HEART RATE: 84 BPM | TEMPERATURE: 97.6 F | SYSTOLIC BLOOD PRESSURE: 156 MMHG | OXYGEN SATURATION: 95 %

## 2023-06-30 VITALS
RESPIRATION RATE: 16 BRPM | TEMPERATURE: 98.4 F | SYSTOLIC BLOOD PRESSURE: 136 MMHG | OXYGEN SATURATION: 96 % | DIASTOLIC BLOOD PRESSURE: 64 MMHG | HEART RATE: 92 BPM

## 2023-06-30 DIAGNOSIS — J44.1 COPD WITH ACUTE EXACERBATION (HCC): Primary | ICD-10-CM

## 2023-06-30 LAB
2HR DELTA HS TROPONIN: 1 NG/L
ALBUMIN SERPL BCP-MCNC: 3.8 G/DL (ref 3.5–5)
ALP SERPL-CCNC: 92 U/L (ref 34–104)
ALT SERPL W P-5'-P-CCNC: 20 U/L (ref 7–52)
ANION GAP SERPL CALCULATED.3IONS-SCNC: 8 MMOL/L
AST SERPL W P-5'-P-CCNC: 20 U/L (ref 13–39)
ATRIAL RATE: 100 BPM
ATRIAL RATE: 91 BPM
BACTERIA BLD CULT: NORMAL
BACTERIA BLD CULT: NORMAL
BASOPHILS # BLD AUTO: 0.09 THOUSANDS/ÂΜL (ref 0–0.1)
BASOPHILS NFR BLD AUTO: 1 % (ref 0–1)
BILIRUB SERPL-MCNC: 0.45 MG/DL (ref 0.2–1)
BUN SERPL-MCNC: 11 MG/DL (ref 5–25)
CALCIUM SERPL-MCNC: 9.5 MG/DL (ref 8.4–10.2)
CARDIAC TROPONIN I PNL SERPL HS: 4 NG/L
CARDIAC TROPONIN I PNL SERPL HS: 4 NG/L
CHLORIDE SERPL-SCNC: 102 MMOL/L (ref 96–108)
CO2 SERPL-SCNC: 24 MMOL/L (ref 21–32)
CREAT SERPL-MCNC: 0.91 MG/DL (ref 0.6–1.3)
EOSINOPHIL # BLD AUTO: 0.37 THOUSAND/ÂΜL (ref 0–0.61)
EOSINOPHIL NFR BLD AUTO: 3 % (ref 0–6)
ERYTHROCYTE [DISTWIDTH] IN BLOOD BY AUTOMATED COUNT: 13.8 % (ref 11.6–15.1)
FLUAV RNA RESP QL NAA+PROBE: NEGATIVE
FLUBV RNA RESP QL NAA+PROBE: NEGATIVE
GFR SERPL CREATININE-BSD FRML MDRD: 83 ML/MIN/1.73SQ M
GLUCOSE SERPL-MCNC: 225 MG/DL (ref 65–140)
HCT VFR BLD AUTO: 42.6 % (ref 36.5–49.3)
HGB BLD-MCNC: 13.9 G/DL (ref 12–17)
IMM GRANULOCYTES # BLD AUTO: 0.07 THOUSAND/UL (ref 0–0.2)
IMM GRANULOCYTES NFR BLD AUTO: 1 % (ref 0–2)
LACTATE SERPL-SCNC: 1.2 MMOL/L (ref 0.5–2)
LYMPHOCYTES # BLD AUTO: 1.97 THOUSANDS/ÂΜL (ref 0.6–4.47)
LYMPHOCYTES NFR BLD AUTO: 16 % (ref 14–44)
MCH RBC QN AUTO: 29 PG (ref 26.8–34.3)
MCHC RBC AUTO-ENTMCNC: 32.6 G/DL (ref 31.4–37.4)
MCV RBC AUTO: 89 FL (ref 82–98)
MONOCYTES # BLD AUTO: 0.51 THOUSAND/ÂΜL (ref 0.17–1.22)
MONOCYTES NFR BLD AUTO: 4 % (ref 4–12)
NEUTROPHILS # BLD AUTO: 9.36 THOUSANDS/ÂΜL (ref 1.85–7.62)
NEUTS SEG NFR BLD AUTO: 75 % (ref 43–75)
NRBC BLD AUTO-RTO: 0 /100 WBCS
P AXIS: 66 DEGREES
P AXIS: 66 DEGREES
PLATELET # BLD AUTO: 322 THOUSANDS/UL (ref 149–390)
PMV BLD AUTO: 8.1 FL (ref 8.9–12.7)
POTASSIUM SERPL-SCNC: 5.1 MMOL/L (ref 3.5–5.3)
PR INTERVAL: 170 MS
PR INTERVAL: 170 MS
PROT SERPL-MCNC: 7.3 G/DL (ref 6.4–8.4)
QRS AXIS: 69 DEGREES
QRS AXIS: 71 DEGREES
QRSD INTERVAL: 84 MS
QRSD INTERVAL: 88 MS
QT INTERVAL: 362 MS
QT INTERVAL: 388 MS
QTC INTERVAL: 466 MS
QTC INTERVAL: 477 MS
RBC # BLD AUTO: 4.79 MILLION/UL (ref 3.88–5.62)
RSV RNA RESP QL NAA+PROBE: NEGATIVE
SARS-COV-2 RNA RESP QL NAA+PROBE: NEGATIVE
SODIUM SERPL-SCNC: 134 MMOL/L (ref 135–147)
T WAVE AXIS: 64 DEGREES
T WAVE AXIS: 65 DEGREES
VENTRICULAR RATE: 100 BPM
VENTRICULAR RATE: 91 BPM
WBC # BLD AUTO: 12.37 THOUSAND/UL (ref 4.31–10.16)

## 2023-06-30 PROCEDURE — 36415 COLL VENOUS BLD VENIPUNCTURE: CPT

## 2023-06-30 PROCEDURE — 71045 X-RAY EXAM CHEST 1 VIEW: CPT

## 2023-06-30 PROCEDURE — 99285 EMERGENCY DEPT VISIT HI MDM: CPT

## 2023-06-30 PROCEDURE — 99285 EMERGENCY DEPT VISIT HI MDM: CPT | Performed by: EMERGENCY MEDICINE

## 2023-06-30 PROCEDURE — 80053 COMPREHEN METABOLIC PANEL: CPT | Performed by: EMERGENCY MEDICINE

## 2023-06-30 PROCEDURE — 83605 ASSAY OF LACTIC ACID: CPT

## 2023-06-30 PROCEDURE — 85025 COMPLETE CBC W/AUTO DIFF WBC: CPT | Performed by: EMERGENCY MEDICINE

## 2023-06-30 PROCEDURE — 94644 CONT INHLJ TX 1ST HOUR: CPT

## 2023-06-30 PROCEDURE — 84484 ASSAY OF TROPONIN QUANT: CPT | Performed by: EMERGENCY MEDICINE

## 2023-06-30 PROCEDURE — 93005 ELECTROCARDIOGRAM TRACING: CPT

## 2023-06-30 PROCEDURE — 84484 ASSAY OF TROPONIN QUANT: CPT

## 2023-06-30 RX ORDER — PREDNISONE 20 MG/1
40 TABLET ORAL ONCE
Status: COMPLETED | OUTPATIENT
Start: 2023-06-30 | End: 2023-06-30

## 2023-06-30 RX ORDER — ALPRAZOLAM 0.25 MG/1
0.25 TABLET ORAL ONCE
Status: COMPLETED | OUTPATIENT
Start: 2023-06-30 | End: 2023-06-30

## 2023-06-30 RX ORDER — PREDNISONE 20 MG/1
40 TABLET ORAL DAILY
Qty: 8 TABLET | Refills: 0 | Status: SHIPPED | OUTPATIENT
Start: 2023-06-30

## 2023-06-30 RX ORDER — SODIUM CHLORIDE FOR INHALATION 0.9 %
12 VIAL, NEBULIZER (ML) INHALATION ONCE
Status: COMPLETED | OUTPATIENT
Start: 2023-06-30 | End: 2023-06-30

## 2023-06-30 RX ORDER — ALBUTEROL SULFATE 90 UG/1
2 AEROSOL, METERED RESPIRATORY (INHALATION) ONCE
Status: COMPLETED | OUTPATIENT
Start: 2023-06-30 | End: 2023-06-30

## 2023-06-30 RX ADMIN — ALBUTEROL SULFATE 10 MG: 2.5 SOLUTION RESPIRATORY (INHALATION) at 17:11

## 2023-06-30 RX ADMIN — ALPRAZOLAM 0.25 MG: 0.25 TABLET ORAL at 02:06

## 2023-06-30 RX ADMIN — Medication 12 ML: at 17:12

## 2023-06-30 RX ADMIN — ALBUTEROL SULFATE 2 PUFF: 90 AEROSOL, METERED RESPIRATORY (INHALATION) at 02:06

## 2023-06-30 RX ADMIN — PREDNISONE 40 MG: 20 TABLET ORAL at 20:05

## 2023-06-30 RX ADMIN — IPRATROPIUM BROMIDE 1 MG: 0.5 SOLUTION RESPIRATORY (INHALATION) at 17:12

## 2023-06-30 NOTE — ED PROVIDER NOTES
History  Chief Complaint   Patient presents with   • Shortness of Breath     Pt coming from home via EMS for SOB  Pt d/c from this hospital earlier today, was admitted for 5 days for UTI currently on abx  Pt reports SOB starting suddenly while sitting in chair watching TV tonight  Denies cp  Pt also reports abdominal pain and nausea  Unable to elaborate when that began  44-year-old male with history of diabetes, multiple sclerosis, hypertension, hyperlipidemia, CHF who presents with shortness of breath  Just prior to arrival, patient started to experience shortness of breath while watching TV  He was discharged from this facility on 6/28 after being admitted on 6/25 for sepsis and pyelonephritis  He was seen in consultation by urology who exchange chronic suprapubic catheter on 6/25/2023  He was continued on IV antibiotics and seen in consultation by infectious disease; he started on IV ampicillin to cover Enterococcus  He does report nausea and abdominal pain but is unsure of when this began  He denies chest pain, leg swelling, hemoptysis, fever, chills  Prior to Admission Medications   Prescriptions Last Dose Informant Patient Reported? Taking?    ALPRAZolam (XANAX) 0 25 mg tablet  Self Yes No   Sig: Take 0 25 mg by mouth 2 (two) times a day as needed for anxiety or sleep    Dulaglutide (Trulicity) 2 47 MJ/1 4SI SOPN  Self Yes No   Sig: Inject 0 75 mg under the skin once a week   Ergocalciferol (VITAMIN D2 PO)  Self Yes No   Sig: Take 50,000 Units by mouth once a week   albuterol (2 5 mg/3 mL) 0 083 % nebulizer solution  Self Yes No   Sig: Take 2 5 mg by nebulization every 6 (six) hours as needed for wheezing or shortness of breath   albuterol (PROVENTIL HFA,VENTOLIN HFA) 90 mcg/act inhaler  Self Yes No   Sig: Inhale 2 puffs every 6 (six) hours as needed for wheezing   amLODIPine-atorvastatin (CADUET) 10-80 MG per tablet  Self Yes No   Sig: Take 1 tablet by mouth daily   amoxicillin (AMOXIL) 500 mg capsule   No No   Sig: Take 1 capsule (500 mg total) by mouth every 8 (eight) hours for 6 days   clopidogrel (PLAVIX) 75 mg tablet  Self No No   Sig: Take 1 tablet (75 mg total) by mouth daily   furosemide (LASIX) 40 mg tablet  Self Yes No   Sig: Take 40 mg by mouth daily   gabapentin (NEURONTIN) 300 mg capsule  Self No No   Sig: Take 1 capsule (300 mg total) by mouth 2 (two) times a day   gabapentin (NEURONTIN) 300 mg capsule   No No   Sig: Take 2 capsules (600 mg total) by mouth daily at bedtime   latanoprost (XALATAN) 0 005 % ophthalmic solution  Self Yes No   Sig: Administer 1 drop to both eyes daily at bedtime   melatonin 3 mg  Self Yes No   Sig: Take 3 mg by mouth daily at bedtime as needed   pantoprazole (PROTONIX) 40 mg tablet   No No   Sig: TAKE 1 TABLET TWICE DAILY 30 MINUTES BEFORE BREAKFAST AND DINNER    polyethylene glycol (MIRALAX) 17 g packet   Yes No   Sig: Take 17 g by mouth daily as needed   potassium chloride (Klor-Con) 10 mEq tablet   Yes No   Sig: Take 10 mEq by mouth 2 (two) times a day   propranolol (INDERAL LA) 60 mg 24 hr capsule   Yes No   Sig: Take 60 mg by mouth daily   senna-docusate sodium (SENOKOT S) 8 6-50 mg per tablet   Yes No   Sig: Take 2 tablets by mouth daily at bedtime   sertraline (ZOLOFT) 25 mg tablet   Yes No   Sig: Take 25 mg by mouth daily at bedtime      Facility-Administered Medications: None       Past Medical History:   Diagnosis Date   • Acute laryngitis    • Acute nonsuppurative otitis media, unspecified laterality    • Arm weakness    • Arthritis    • 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HCC)    • Bladder infection    • Bronchitis    • Constipation    • Cough    • Diabetes mellitus (HCC)    • Dizziness    • Dysfunction of eustachian tube    • Erectile dysfunction of non-organic origin    • Fatigue    • Glaucoma    • Hiatal hernia    • Hypertension    • Imbalance    • Leg muscle spasm    • MS (multiple sclerosis) (HCC)    • Nephrolithiasis    • Neurogenic bladder    • No natural teeth    • Sinus pain    • Spinal stenosis    • Strain of thoracic region    • Stroke [facility])    • Suprapubic catheter [facility])        Past Surgical History:   Procedure Laterality Date   • APPENDECTOMY     • BRAIN SURGERY      Coil placed in aneurysm   • CYSTOSCOPY     • CYSTOSCOPY     • CYSTOSCOPY  2018   • CYSTOSCOPY  01/15/2021   • EYE SURGERY      transscleral cyclophotocoagulation noncontact YAG laser   • MYRINGOTOMY      with ventilation tube insertion   • VA LITHOLAPAXY SMPL/SM <2 5 CM N/A 2019    Procedure: CYSTOSCOPY, holmium laser litholapaxy of bladder stones, EXCHANGE OF SP TUBE;  Surgeon: Yecenia Garcia MD;  Location: [facility];  Service: Urology   • SUPRAPUBIC CATHETER INSERTION         Family History   Problem Relation Age of Onset   • Heart attack Mother    • Stroke Mother    • Heart attack Father    • Anuerysm Father         In Stomach      I have reviewed and agree with the history as documented  E-Cigarette/Vaping   • E-Cigarette Use Never User      E-Cigarette/Vaping Substances   • Nicotine No    • THC No    • CBD No    • Flavoring No    • Other No    • Unknown No      Social History     Tobacco Use   • Smoking status: Former     Packs/day: 0 50     Years: 31 00     Total pack years: 15 50     Types: Cigarettes     Start date:      Quit date:      Years since quittin 5   • Smokeless tobacco: Never   Vaping Use   • Vaping Use: Never used   Substance Use Topics   • Alcohol use: Not Currently   • Drug use: No       Review of Systems   Constitutional: Negative for chills and fever  HENT: Negative for congestion and rhinorrhea  Respiratory: Positive for shortness of breath  Negative for cough  Cardiovascular: Negative for chest pain and leg swelling  Gastrointestinal: Positive for abdominal pain and nausea  Negative for constipation, diarrhea and vomiting  Genitourinary: Negative for dysuria and flank pain  Musculoskeletal: Negative for arthralgias and myalgias  Skin: Negative for rash and wound  Neurological: Negative for dizziness, weakness, numbness and headaches  Psychiatric/Behavioral: Negative for behavioral problems  Physical Exam  Physical Exam  Vitals and nursing note reviewed  Constitutional:       General: He is not in acute distress  Appearance: He is well-developed  He is not ill-appearing or toxic-appearing  HENT:      Head: Normocephalic and atraumatic  Eyes:      Conjunctiva/sclera: Conjunctivae normal    Cardiovascular:      Rate and Rhythm: Normal rate and regular rhythm  Heart sounds: No murmur heard  Pulmonary:      Effort: Pulmonary effort is normal  Tachypnea present  No respiratory distress  Breath sounds: Normal breath sounds  No decreased breath sounds, wheezing, rhonchi or rales  Abdominal:      Palpations: Abdomen is soft  Tenderness: There is no abdominal tenderness  There is no guarding or rebound  Comments: Suprapubic catheter in place   Musculoskeletal:         General: No swelling  Cervical back: Neck supple  Right lower leg: No tenderness  No edema  Left lower leg: No tenderness  No edema  Skin:     General: Skin is warm and dry  Capillary Refill: Capillary refill takes less than 2 seconds  Neurological:      Mental Status: He is alert  Psychiatric:         Mood and Affect: Mood is anxious           Vital Signs  ED Triage Vitals   Temperature Pulse Respirations Blood Pressure SpO2   06/29/23 2121 06/29/23 2119 06/29/23 2119 06/29/23 2121 06/29/23 2119   97 6 °F (36 4 °C) 102 (!) 26 (!) 174/80 99 %      Temp Source Heart Rate Source Patient Position - Orthostatic VS BP Location FiO2 (%)   06/29/23 2121 06/29/23 2119 06/29/23 2119 06/29/23 2119 --   Oral Monitor Lying Right arm       Pain Score       --                  Vitals:    06/29/23 2300 06/29/23 2315 06/30/23 0000 06/30/23 0100   BP: (!) 184/74 (!) 175/74 (!) 171/79 156/76   Pulse: 102 100 92 84   Patient Position - Orthostatic VS: Lying Lying Lying Lying         Visual Acuity      ED Medications  Medications   ondansetron (ZOFRAN) injection 4 mg (4 mg Intravenous Given 6/29/23 2311)   sodium chloride 0 9 % bolus 1,000 mL (0 mL Intravenous Stopped 6/30/23 0159)   albuterol inhalation solution 2 5 mg (2 5 mg Nebulization Given 6/29/23 2325)   ALPRAZolam (XANAX) tablet 0 25 mg (0 25 mg Oral Given 6/30/23 0206)   albuterol (PROVENTIL HFA,VENTOLIN HFA) inhaler 2 puff (2 puffs Inhalation Given 6/30/23 0206)       Diagnostic Studies  Results Reviewed     Procedure Component Value Units Date/Time    Lactic acid 2 Hours [[id number]]  (Normal) Collected: 06/30/23 0046    Lab Status: Final result Specimen: Blood from Arm, Left Updated: 06/30/23 0137     LACTIC ACID 1 2 mmol/L     Narrative:      Result may be elevated if tourniquet was used during collection  HS Troponin I 2hr [[id number]]  (Normal) Collected: 06/30/23 0004    Lab Status: Final result Specimen: Blood from Arm, Left Updated: 06/30/23 0033     hs TnI 2hr 4 ng/L      Delta 2hr hsTnI 1 ng/L     Blood gas, venous [[id number]]  (Abnormal) Collected: 06/29/23 2310    Lab Status: Final result Specimen: Blood from Arm, Left Updated: 06/29/23 2325     pH, Surjit 7 377     pCO2, Surjit 47 4 mm Hg      pO2, Surjit 26 7 mm Hg      HCO3, Surjit 27 2 mmol/L      Base Excess, Surjit 1 3 mmol/L      O2 Content, Surjit 10 0 ml/dL      O2 HGB, VENOUS 45 4 %     Urine Microscopic [[id number]]  (Abnormal) Collected: 06/29/23 2242    Lab Status: Final result Specimen: Urine, Suprapubic catheter Updated: 06/29/23 2255     RBC, UA 1-2 /hpf      WBC, UA 30-50 /hpf      Epithelial Cells None Seen /hpf      Bacteria, UA None Seen /hpf      Budding Yeast Present    Urine culture [[id number]] Collected: 06/29/23 2242    Lab Status:  In process Specimen: Urine, Suprapubic catheter Updated: 06/29/23 2255    UA w Reflex to Microscopic w Reflex to Culture [[id number]]  (Abnormal) Collected: 06/29/23 8840    Lab Status: Final result Specimen: Urine, Suprapubic catheter Updated: 06/29/23 2250     Color, UA Colorless     Clarity, UA Clear     Specific Gravity, UA 1 006     pH, UA 8 0     Leukocytes, UA Moderate     Nitrite, UA Negative     Protein, UA Negative mg/dl      Glucose,  (3/20%) mg/dl      Ketones, UA Trace mg/dl      Urobilinogen, UA <2 0 mg/dl      Bilirubin, UA Negative     Occult Blood, UA Negative    Lactic acid, plasma (w/reflex if result > 2 0) [[id number]]  (Abnormal) Collected: 06/29/23 2159    Lab Status: Final result Specimen: Blood from Arm, Left Updated: 06/29/23 2246     LACTIC ACID 2 8 mmol/L     Narrative:      Result may be elevated if tourniquet was used during collection      Procalcitonin [[id number]]  (Normal) Collected: 06/29/23 2159    Lab Status: Final result Specimen: Blood from Arm, Left Updated: 06/29/23 2241     Procalcitonin 0 05 ng/ml     HS Troponin 0hr (reflex protocol) [[id number]]  (Normal) Collected: 06/29/23 2159    Lab Status: Final result Specimen: Blood from Arm, Left Updated: 06/29/23 2237     hs TnI 0hr 3 ng/L     B-Type Natriuretic Peptide(BNP) [[id number]]  (Normal) Collected: 06/29/23 2159    Lab Status: Final result Specimen: Blood from Arm, Left Updated: 06/29/23 2235     BNP 34 pg/mL     Comprehensive metabolic panel [[id number]]  (Abnormal) Collected: 06/29/23 2159    Lab Status: Final result Specimen: Blood from Arm, Left Updated: 06/29/23 2234     Sodium 133 mmol/L      Potassium 4 2 mmol/L      Chloride 97 mmol/L      CO2 27 mmol/L      ANION GAP 9 mmol/L      BUN 12 mg/dL      Creatinine 0 82 mg/dL      Glucose 202 mg/dL      Calcium 10 8 mg/dL      AST 20 U/L      ALT 24 U/L      Alkaline Phosphatase 109 U/L      Total Protein 7 8 g/dL      Albumin 4 1 g/dL      Total Bilirubin 0 46 mg/dL      eGFR 87 ml/min/1 73sq m     Narrative:      [place] guidelines for Chronic Kidney Disease (CKD):   •  Stage 1 with normal or high GFR (GFR > 90 mL/min/1 73 square meters)  •  Stage 2 Mild CKD (GFR = 60-89 mL/min/1 73 square meters)  •  Stage 3A Moderate CKD (GFR = 45-59 mL/min/1 73 square meters)  •  Stage 3B Moderate CKD (GFR = 30-44 mL/min/1 73 square meters)  •  Stage 4 Severe CKD (GFR = 15-29 mL/min/1 73 square meters)  •  Stage 5 End Stage CKD (GFR <15 mL/min/1 73 square meters)  Note: GFR calculation is accurate only with a steady state creatinine    Blood culture #2 [[id number]] Collected: 06/29/23 2220    Lab Status: In process Specimen: Blood from Hand, Left Updated: 06/29/23 2225    Blood culture #1 [[id number]] Collected: 06/29/23 2159    Lab Status: In process Specimen: Blood from Arm, Left Updated: 06/29/23 2225    D-Dimer [[id number]]  (Abnormal) Collected: 06/29/23 2159    Lab Status: Final result Specimen: Blood from Arm, Left Updated: 06/29/23 2225     D-Dimer, Quant 0 57 ug/ml FEU     Narrative: In the evaluation for possible pulmonary embolism, in the appropriate (Well's Score of 4 or less) patient, the age adjusted d-dimer cutoff for this patient can be calculated as:    Age x 0 01 (in ug/mL) for Age-adjusted D-dimer exclusion threshold for a patient over 50 years      Protime-INR [[id number]]  (Abnormal) Collected: 06/29/23 2159    Lab Status: Final result Specimen: Blood from Arm, Left Updated: 06/29/23 2223     Protime 11 5 seconds      INR 0 84    APTT [[id number]]  (Normal) Collected: 06/29/23 2159    Lab Status: Final result Specimen: Blood from Arm, Left Updated: 06/29/23 2223     PTT 25 seconds     CBC and differential [[id number]]  (Abnormal) Collected: 06/29/23 2159    Lab Status: Final result Specimen: Blood from Arm, Left Updated: 06/29/23 2208     WBC 11 97 Thousand/uL      RBC 5 13 Million/uL      Hemoglobin 14 9 g/dL      Hematocrit 44 8 %      MCV 87 fL      MCH 29 0 pg      MCHC 33 3 g/dL      RDW 13 4 %      MPV 8 3 fL      Platelets 548 Thousands/uL      nRBC 0 /100 WBCs      Neutrophils Relative 65 %      Immat GRANS % 0 % Lymphocytes Relative 23 %      Monocytes Relative 6 %      Eosinophils Relative 5 %      Basophils Relative 1 %      Neutrophils Absolute 7 78 Thousands/µL      Immature Grans Absolute 0 05 Thousand/uL      Lymphocytes Absolute 2 77 Thousands/µL      Monocytes Absolute 0 69 Thousand/µL      Eosinophils Absolute 0 59 Thousand/µL      Basophils Absolute 0 09 Thousands/µL     FLU/RSV/COVID - if FLU/RSV clinically relevant [[id number]] Collected: 06/29/23 2159    Lab Status: In process Specimen: Nares from Nose Updated: 06/29/23 2205                 XR chest 1 view portable   ED Interpretation by Jareth Corcoran PA-C (06/29 2345)   No evidence of infiltrate, pneumothorax, or acute cardiopulmonary disease as interpreted by me                 Procedures  Procedures         ED Course  ED Course as of 06/30/23 0446   Thu Jun 29, 2023   2224 WBC(!): 11 97   2239 D-Dimer, Quant(!): 0 57  Within normal limits age-adjusted   2239 BNP: 34   2305 Bacteria, UA: None Seen   2309 Procalcitonin: 0 05   2309 Anion Gap: 9   2334 pH, Surjit: 7 377   2334 pCO2, Surjit: 47 4   2334 HCO3, Surjit: 27 2   Fri Jun 30, 2023   0106 Nitrite, UA: Negative   0137 LACTIC ACID: 1 2   0138 Pulse: 84   0138 Respirations: 21   0138 SpO2: 95 %   0138 Blood Pressure: 156/76     EKG: Normal sinus rhythm at 91 bpm, normal axis, , QTc 477, no ST elevation or depression as interpreted by me    REPEAT EKG normal sinus rhythm at 91 bpm, , QTc 477, no ST elevation or depression, no significant changes interpreted by me    Medical Decision Making  DDX including but not limited to: pneumonia, pleural effusion, PE, PTX, ACS, MI, asthma exacerbation,  COVID 19, anemia, metabolic abnormality, renal failure  On exam, the patient is in no acute distress  VSS, the patient with tachypnea  He is anxious appearing  SPO2 remains 98 to 100% on room air  Patient without conversational dyspnea, accessory muscle use  HRRR  Lungs CTA  Abdomen soft and nontender  No peripheral edema  We will obtain CBC to rule out anemia, CMP to evaluate for electrolyte abnormalities, ARMANDO, D-dimer to evaluate for PE, BMP to evaluate for CHF, chest x-ray, EKG/troponin to evaluate for ACS, lactic acid, procalcitonin, blood cultures given history of recent sepsis in setting of tachypnea  Will obtain UA as well  Patient's work-up benign  Minimal leukocytosis, D-dimer within normal limits age-adjusted  Chest x-ray without acute abnormality  Procalcitonin within normal limits  Lactic acid is mildly elevated  Labs otherwise unremarkable  Patient's tachypnea has significantly improved, however patient continues to report dyspnea  Will obtain VBG  Will obtain repeat lactic following fluids  Patient denies abdominal pain at this time  UA not consistent with infection as no bacteria, no nitrates  Will trial albuterol nebulization as patient has been prescribed this in the past via pulmonology despite no history of COPD/asthma  VBG without gross abnormality  On reexamination, patient reports feeling improved  He does admit to feeling anxious  Continues to deny abdominal pain  SPO2 remains within normal limits  He remains without tachypnea  Vital signs remained stable  Will give 0 25 mg of Xanax  Will discharge    At the time of discharge, the patient is in no acute distress  I discussed with the patient the diagnosis, treatment plan, follow-up, return precautions, and discharge instructions; they were given the opportunity to ask questions and verbalized understanding  Dyspnea: acute illness or injury  Amount and/or Complexity of Data Reviewed  External Data Reviewed: labs, radiology, ECG and notes  Labs: ordered  Decision-making details documented in ED Course  Radiology: ordered and independent interpretation performed  Decision-making details documented in ED Course  ECG/medicine tests: ordered and independent interpretation performed   Decision-making details documented in ED Course  Discussion of management or test interpretation with external provider(s): ED Attending (Dr Harsh Hawkins)    Risk  Prescription drug management  Disposition  Final diagnoses:   Dyspnea     Time reflects when diagnosis was documented in both MDM as applicable and the Disposition within this note     Time User Action Codes Description Comment    6/30/2023  1:38 AM Yariel Lockhart [[id number] [id number]] Dyspnea       ED Disposition     ED Disposition   Discharge    Condition   Stable    Date/Time   Fri Jun 30, 2023  1:38 AM    Comment   Nawaf Self discharge to home/self care                 Follow-up Information     Follow up With Specialties Details Why [facility], DO Geriatric Medicine, [address]  [phone]            Discharge Medication List as of 6/30/2023  2:59 AM      CONTINUE these medications which have NOT CHANGED    Details   albuterol (2 5 mg/3 mL) 0 083 % nebulizer solution Take 2 5 mg by nebulization every 6 (six) hours as needed for wheezing or shortness of breath, Historical Med      albuterol (PROVENTIL HFA,VENTOLIN HFA) 90 mcg/act inhaler Inhale 2 puffs every 6 (six) hours as needed for wheezing, Historical Med      ALPRAZolam (XANAX) 0 25 mg tablet Take 0 25 mg by mouth 2 (two) times a day as needed for anxiety or sleep , Historical Med      amLODIPine-atorvastatin (CADUET) 10-80 MG per tablet Take 1 tablet by mouth daily, Historical Med      amoxicillin (AMOXIL) 500 mg capsule Take 1 capsule (500 mg total) by mouth every 8 (eight) hours for 6 days, Starting Wed 6/28/2023, Until Tue 7/4/2023, Normal      clopidogrel (PLAVIX) 75 mg tablet Take 1 tablet (75 mg total) by mouth daily, Starting Sat 10/27/2018, No Print      Dulaglutide (Trulicity) 3 92 UD/5 4NH SOPN Inject 0 75 mg under the skin once a week, Historical Med      Ergocalciferol (VITAMIN D2 PO) Take 50,000 Units by mouth once a week, Historical Med furosemide (LASIX) 40 mg tablet Take 40 mg by mouth daily, Historical Med      !! gabapentin (NEURONTIN) 300 mg capsule Take 1 capsule (300 mg total) by mouth 2 (two) times a day, Starting Thu 6/3/2021, Normal      !! gabapentin (NEURONTIN) 300 mg capsule Take 2 capsules (600 mg total) by mouth daily at bedtime, Starting Thu 6/3/2021, Normal      latanoprost (XALATAN) 0 005 % ophthalmic solution Administer 1 drop to both eyes daily at bedtime, Historical Med      melatonin 3 mg Take 3 mg by mouth daily at bedtime as needed, Historical Med      pantoprazole (PROTONIX) 40 mg tablet TAKE 1 TABLET TWICE DAILY 30 MINUTES BEFORE BREAKFAST AND DINNER , Normal      polyethylene glycol (MIRALAX) 17 g packet Take 17 g by mouth daily as needed, Historical Med      potassium chloride (Klor-Con) 10 mEq tablet Take 10 mEq by mouth 2 (two) times a day, Historical Med      propranolol (INDERAL LA) 60 mg 24 hr capsule Take 60 mg by mouth daily, Historical Med      senna-docusate sodium (SENOKOT S) 8 6-50 mg per tablet Take 2 tablets by mouth daily at bedtime, Historical Med      sertraline (ZOLOFT) 25 mg tablet Take 25 mg by mouth daily at bedtime, Historical Med       !! - Potential duplicate medications found  Please discuss with provider  No discharge procedures on file      PDMP Review       Value Time User    PDMP Reviewed  Yes 6/3/2023  7:35 AM Estella Glynn PA-C          ED Provider  Electronically Signed by           Madhuri Flores PA-C  06/30/23 8379

## 2023-06-30 NOTE — DISCHARGE INSTRUCTIONS
Return for worsening shortness of breath or for fever, chest pain, abdominal pain, or any other new/concerning symptoms    Follow up with your PCP and with Urology    Continue taking Amoxicillin as prescribed at discharge

## 2023-07-01 LAB
ATRIAL RATE: 80 BPM
P AXIS: 60 DEGREES
PR INTERVAL: 176 MS
QRS AXIS: 68 DEGREES
QRSD INTERVAL: 90 MS
QT INTERVAL: 396 MS
QTC INTERVAL: 456 MS
T WAVE AXIS: 68 DEGREES
VENTRICULAR RATE: 80 BPM

## 2023-07-01 PROCEDURE — 93010 ELECTROCARDIOGRAM REPORT: CPT | Performed by: STUDENT IN AN ORGANIZED HEALTH CARE EDUCATION/TRAINING PROGRAM

## 2023-07-01 NOTE — ED NOTES
Called and spoke with brother Olga Pineda aware pt being discharged home and script for prednisone needs to be picked up at pharmacy  Family member verbalized information        Sarahy Angela RN  06/30/23 1431

## 2023-07-01 NOTE — ED NOTES
Round trip setup for transportation back home ETA 2140     Laura Stone [place]  06/30/23 2205

## 2023-07-02 LAB
BACTERIA BLD CULT: NORMAL
BACTERIA BLD CULT: NORMAL
BACTERIA UR CULT: ABNORMAL

## 2023-07-05 LAB
BACTERIA BLD CULT: NORMAL
BACTERIA BLD CULT: NORMAL

## 2023-07-19 NOTE — ED PROVIDER NOTES
History  Chief Complaint   Patient presents with   • Shortness of Breath     C/o SOB. Seen yesterday for same, given albuterol tx and sent home and symptoms worsening today. Yue Miller is a pleasant 67-year-old male here for evaluation of shortness of breath and palpitations. He was seen in our facility overnight for the same. Was recently admitted for urinary tract infection and is currently finishing antibiotics for that. Patient denies overt chest pain or shortness of breath at rest.  States he "just feels like I cannot get enough air."  No fevers. History provided by:  Patient  Shortness of Breath      Prior to Admission Medications   Prescriptions Last Dose Informant Patient Reported? Taking?    ALPRAZolam (XANAX) 0.25 mg tablet  Self Yes No   Sig: Take 0.25 mg by mouth 2 (two) times a day as needed for anxiety or sleep    Dulaglutide (Trulicity) 1.33 DS/1.6JN SOPN  Self Yes No   Sig: Inject 0.75 mg under the skin once a week   Ergocalciferol (VITAMIN D2 PO)  Self Yes No   Sig: Take 50,000 Units by mouth once a week   albuterol (2.5 mg/3 mL) 0.083 % nebulizer solution  Self Yes No   Sig: Take 2.5 mg by nebulization every 6 (six) hours as needed for wheezing or shortness of breath   albuterol (PROVENTIL HFA,VENTOLIN HFA) 90 mcg/act inhaler  Self Yes No   Sig: Inhale 2 puffs every 6 (six) hours as needed for wheezing   amLODIPine-atorvastatin (CADUET) 10-80 MG per tablet  Self Yes No   Sig: Take 1 tablet by mouth daily   amoxicillin (AMOXIL) 500 mg capsule   No No   Sig: Take 1 capsule (500 mg total) by mouth every 8 (eight) hours for 6 days   clopidogrel (PLAVIX) 75 mg tablet  Self No No   Sig: Take 1 tablet (75 mg total) by mouth daily   furosemide (LASIX) 40 mg tablet  Self Yes No   Sig: Take 40 mg by mouth daily   gabapentin (NEURONTIN) 300 mg capsule  Self No No   Sig: Take 1 capsule (300 mg total) by mouth 2 (two) times a day   gabapentin (NEURONTIN) 300 mg capsule   No No   Sig: Take 2 capsules (600 mg total) by mouth daily at bedtime   latanoprost (XALATAN) 0.005 % ophthalmic solution  Self Yes No   Sig: Administer 1 drop to both eyes daily at bedtime   melatonin 3 mg  Self Yes No   Sig: Take 3 mg by mouth daily at bedtime as needed   pantoprazole (PROTONIX) 40 mg tablet   No No   Sig: TAKE 1 TABLET TWICE DAILY 30 MINUTES BEFORE BREAKFAST AND DINNER.   polyethylene glycol (MIRALAX) 17 g packet   Yes No   Sig: Take 17 g by mouth daily as needed   potassium chloride (Klor-Con) 10 mEq tablet   Yes No   Sig: Take 10 mEq by mouth 2 (two) times a day   propranolol (INDERAL LA) 60 mg 24 hr capsule   Yes No   Sig: Take 60 mg by mouth daily   senna-docusate sodium (SENOKOT S) 8.6-50 mg per tablet   Yes No   Sig: Take 2 tablets by mouth daily at bedtime   sertraline (ZOLOFT) 25 mg tablet   Yes No   Sig: Take 25 mg by mouth daily at bedtime      Facility-Administered Medications: None       Past Medical History:   Diagnosis Date   • Acute laryngitis    • Acute nonsuppurative otitis media, unspecified laterality    • Arm weakness    • Arthritis    • 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HCC)    • Bladder infection    • Bronchitis    • Constipation    • Cough    • Diabetes mellitus ([facility])    • Dizziness    • Dysfunction of eustachian tube    • Erectile dysfunction of non-organic origin    • Fatigue    • Glaucoma    • Hiatal hernia    • Hypertension    • Imbalance    • Leg muscle spasm    • MS (multiple sclerosis) ([facility])    • Nephrolithiasis    • Neurogenic bladder    • No natural teeth    • Sinus pain    • Spinal stenosis    • Strain of thoracic region    • Stroke [facility])    • Suprapubic catheter [facility])        Past Surgical History:   Procedure Laterality Date   • APPENDECTOMY     • BRAIN SURGERY      Coil placed in aneurysm   • CYSTOSCOPY     • CYSTOSCOPY     • CYSTOSCOPY  06/11/2018   • CYSTOSCOPY  01/15/2021   • EYE SURGERY      transscleral cyclophotocoagulation noncontact YAG laser   • MYRINGOTOMY      with ventilation tube insertion   • MI LITHOLAPAXY SMPL/SM <2.5 CM N/A 2019    Procedure: CYSTOSCOPY, holmium laser litholapaxy of bladder stones, EXCHANGE OF SP TUBE;  Surgeon: James Castillo MD;  Location: [facility];  Service: Urology   • SUPRAPUBIC CATHETER INSERTION         Family History   Problem Relation Age of Onset   • Heart attack Mother    • Stroke Mother    • Heart attack Father    • Anuerysm Father         In Stomach      I have reviewed and agree with the history as documented. E-Cigarette/Vaping   • E-Cigarette Use Never User      E-Cigarette/Vaping Substances   • Nicotine No    • THC No    • CBD No    • Flavoring No    • Other No    • Unknown No      Social History     Tobacco Use   • Smoking status: Former     Packs/day: 0.50     Years: 31.00     Total pack years: 15.50     Types: Cigarettes     Start date:      Quit date:      Years since quittin.5   • Smokeless tobacco: Never   Vaping Use   • Vaping Use: Never used   Substance Use Topics   • Alcohol use: Not Currently   • Drug use: No       Review of Systems   Respiratory: Positive for shortness of breath. All other systems reviewed and are negative. Physical Exam  Physical Exam  Vitals and nursing note reviewed. Constitutional:       General: He is not in acute distress. Appearance: He is well-developed. HENT:      Head: Normocephalic and atraumatic. Eyes:      Conjunctiva/sclera: Conjunctivae normal.   Cardiovascular:      Rate and Rhythm: Normal rate and regular rhythm. Heart sounds: No murmur heard. Pulmonary:      Effort: Pulmonary effort is normal. No respiratory distress. Comments: Some mild scattered wheezes. No significant tachypnea, conversational dyspnea, accessory muscle use, or other signs of respiratory distress. Abdominal:      Palpations: Abdomen is soft. Tenderness: There is no abdominal tenderness. Musculoskeletal:         General: No swelling. Cervical back: Neck supple.    Skin: General: Skin is warm and dry. Capillary Refill: Capillary refill takes less than 2 seconds. Neurological:      General: No focal deficit present. Mental Status: He is alert and oriented to person, place, and time.    Psychiatric:         Mood and Affect: Mood normal.         Vital Signs  ED Triage Vitals   Temperature Pulse Respirations Blood Pressure SpO2   06/30/23 1626 06/30/23 1626 06/30/23 1626 06/30/23 1626 06/30/23 1626   98.4 °F (36.9 °C) 90 20 128/58 94 %      Temp Source Heart Rate Source Patient Position - Orthostatic VS BP Location FiO2 (%)   06/30/23 1626 06/30/23 1626 06/30/23 1626 06/30/23 1626 --   Oral Monitor Lying Right arm       Pain Score       06/30/23 2159       No Pain           Vitals:    06/30/23 1720 06/30/23 1915 06/30/23 2159 06/30/23 2315   BP: 126/60 113/54 136/64    Pulse: 80 85 89 92   Patient Position - Orthostatic VS:  Lying           Visual Acuity      ED Medications  Medications   albuterol inhalation solution 10 mg (10 mg Nebulization Given 6/30/23 1711)   ipratropium (ATROVENT) 0.02 % inhalation solution 1 mg (1 mg Nebulization Given 6/30/23 1712)   sodium chloride 0.9 % inhalation solution 12 mL (12 mL Nebulization Given 6/30/23 1712)   predniSONE tablet 40 mg (40 mg Oral Given 6/30/23 2005)       Diagnostic Studies  Results Reviewed     Procedure Component Value Units Date/Time    HS Troponin 0hr (reflex protocol) [[id number]]  (Normal) Collected: 06/30/23 1717    Lab Status: Final result Specimen: Blood from Arm, Left Updated: 06/30/23 1752     hs TnI 0hr 4 ng/L     Comprehensive metabolic panel [[id number]]  (Abnormal) Collected: 06/30/23 1717    Lab Status: Final result Specimen: Blood from Arm, Left Updated: 06/30/23 1748     Sodium 134 mmol/L      Potassium 5.1 mmol/L      Chloride 102 mmol/L      CO2 24 mmol/L      ANION GAP 8 mmol/L      BUN 11 mg/dL      Creatinine 0.91 mg/dL      Glucose 225 mg/dL      Calcium 9.5 mg/dL      AST 20 U/L      ALT 20 U/L Alkaline Phosphatase 92 U/L      Total Protein 7.3 g/dL      Albumin 3.8 g/dL      Total Bilirubin 0.45 mg/dL      eGFR 83 ml/min/1.73sq m     Narrative:      [place] guidelines for Chronic Kidney Disease (CKD):   •  Stage 1 with normal or high GFR (GFR > 90 mL/min/1.73 square meters)  •  Stage 2 Mild CKD (GFR = 60-89 mL/min/1.73 square meters)  •  Stage 3A Moderate CKD (GFR = 45-59 mL/min/1.73 square meters)  •  Stage 3B Moderate CKD (GFR = 30-44 mL/min/1.73 square meters)  •  Stage 4 Severe CKD (GFR = 15-29 mL/min/1.73 square meters)  •  Stage 5 End Stage CKD (GFR <15 mL/min/1.73 square meters)  Note: GFR calculation is accurate only with a steady state creatinine    CBC and differential [[id number]]  (Abnormal) Collected: 06/30/23 1717    Lab Status: Final result Specimen: Blood from Arm, Left Updated: 06/30/23 1729     WBC 12.37 Thousand/uL      RBC 4.79 Million/uL      Hemoglobin 13.9 g/dL      Hematocrit 42.6 %      MCV 89 fL      MCH 29.0 pg      MCHC 32.6 g/dL      RDW 13.8 %      MPV 8.1 fL      Platelets 839 Thousands/uL      nRBC 0 /100 WBCs      Neutrophils Relative 75 %      Immat GRANS % 1 %      Lymphocytes Relative 16 %      Monocytes Relative 4 %      Eosinophils Relative 3 %      Basophils Relative 1 %      Neutrophils Absolute 9.36 Thousands/µL      Immature Grans Absolute 0.07 Thousand/uL      Lymphocytes Absolute 1.97 Thousands/µL      Monocytes Absolute 0.51 Thousand/µL      Eosinophils Absolute 0.37 Thousand/µL      Basophils Absolute 0.09 Thousands/µL                  XR chest 1 view portable   Final Result by Cheyenne Malik MD (07/01 1485)      No acute cardiopulmonary disease.                   Workstation performed: [id number]                    Procedures  Procedures         ED Course                                             Medical Decision Making  COPD with acute exacerbation [facility]): acute illness or injury  Amount and/or Complexity of Data 09-Mar-2018 21:51 Reviewed  External Data Reviewed: notes. Details: Reviewed notes from recent ED visit. Labs: ordered. Radiology: ordered and independent interpretation performed. Details: Chest x-ray negative for pneumonia or pneumothorax. ECG/medicine tests: ordered and independent interpretation performed. Risk  Prescription drug management. Disposition  Final diagnoses:   COPD with acute exacerbation ([address])     Time reflects when diagnosis was documented in both MDM as applicable and the Disposition within this note     Time User Action Codes Description Comment    6/30/2023  7:48 PM Wilene [place] Add [J44.1] COPD exacerbation ([address])     6/30/2023  7:48 PM Wilene [place] Remove [J44.1] COPD exacerbation ([address])     6/30/2023  7:48 PM Wilene [place] Add [J44.1] COPD with acute exacerbation [facility])       ED Disposition     ED Disposition   Discharge    Condition   Stable    Date/Time   Fri Jun 30, 2023  7:49 PM    Comment   Marizol Canseco discharge to home/self care.                Follow-up Information     Follow up With Specialties Details Why [address] [facility], DO Geriatric Medicine, [facility] [address]  [phone]            Discharge Medication List as of 6/30/2023  7:51 PM      START taking these medications    Details   predniSONE 20 mg tablet Take 2 tablets (40 mg total) by mouth daily, Starting Fri 6/30/2023, Normal         CONTINUE these medications which have NOT CHANGED    Details   albuterol (2.5 mg/3 mL) 0.083 % nebulizer solution Take 2.5 mg by nebulization every 6 (six) hours as needed for wheezing or shortness of breath, Historical Med      albuterol (PROVENTIL HFA,VENTOLIN HFA) 90 mcg/act inhaler Inhale 2 puffs every 6 (six) hours as needed for wheezing, Historical Med      ALPRAZolam (XANAX) 0.25 mg tablet Take 0.25 mg by mouth 2 (two) times a day as needed for anxiety or sleep , Historical Med      amLODIPine-atorvastatin (CADUET) 10-80 MG per tablet Take 1 tablet by mouth daily, Historical Med      amoxicillin (AMOXIL) 500 mg capsule Take 1 capsule (500 mg total) by mouth every 8 (eight) hours for 6 days, Starting Wed 6/28/2023, Until Tue 7/4/2023, Normal      clopidogrel (PLAVIX) 75 mg tablet Take 1 tablet (75 mg total) by mouth daily, Starting Sat 10/27/2018, No Print      Dulaglutide (Trulicity) 1.56 YV/4.2ZO SOPN Inject 0.75 mg under the skin once a week, Historical Med      Ergocalciferol (VITAMIN D2 PO) Take 50,000 Units by mouth once a week, Historical Med      furosemide (LASIX) 40 mg tablet Take 40 mg by mouth daily, Historical Med      !! gabapentin (NEURONTIN) 300 mg capsule Take 1 capsule (300 mg total) by mouth 2 (two) times a day, Starting u 6/3/2021, Normal      !! gabapentin (NEURONTIN) 300 mg capsule Take 2 capsules (600 mg total) by mouth daily at bedtime, Starting u 6/3/2021, Normal      latanoprost (XALATAN) 0.005 % ophthalmic solution Administer 1 drop to both eyes daily at bedtime, Historical Med      melatonin 3 mg Take 3 mg by mouth daily at bedtime as needed, Historical Med      pantoprazole (PROTONIX) 40 mg tablet TAKE 1 TABLET TWICE DAILY 30 MINUTES BEFORE BREAKFAST AND DINNER., Normal      polyethylene glycol (MIRALAX) 17 g packet Take 17 g by mouth daily as needed, Historical Med      potassium chloride (Klor-Con) 10 mEq tablet Take 10 mEq by mouth 2 (two) times a day, Historical Med      propranolol (INDERAL LA) 60 mg 24 hr capsule Take 60 mg by mouth daily, Historical Med      senna-docusate sodium (SENOKOT S) 8.6-50 mg per tablet Take 2 tablets by mouth daily at bedtime, Historical Med      sertraline (ZOLOFT) 25 mg tablet Take 25 mg by mouth daily at bedtime, Historical Med       !! - Potential duplicate medications found. Please discuss with provider. No discharge procedures on file.     PDMP Review       Value Time User    PDMP Reviewed  Yes 6/3/2023  7:35 AM Vicki Buck PA-C          ED Provider  Electronically Signed by           Maureen Suero MD  07/19/23 8509

## 2023-08-18 ENCOUNTER — HOSPITAL ENCOUNTER (INPATIENT)
Facility: HOSPITAL | Age: 73
LOS: 4 days | Discharge: HOME | DRG: 872 | End: 2023-08-22
Attending: EMERGENCY MEDICINE | Admitting: INTERNAL MEDICINE
Payer: MEDICARE

## 2023-08-18 ENCOUNTER — APPOINTMENT (EMERGENCY)
Dept: CT IMAGING | Facility: HOSPITAL | Age: 73
DRG: 872 | End: 2023-08-18
Payer: MEDICARE

## 2023-08-18 DIAGNOSIS — R00.0 TACHYCARDIA: ICD-10-CM

## 2023-08-18 DIAGNOSIS — R65.10 SIRS (SYSTEMIC INFLAMMATORY RESPONSE SYNDROME) (HCC): ICD-10-CM

## 2023-08-18 DIAGNOSIS — R06.00 DYSPNEA: Primary | ICD-10-CM

## 2023-08-18 DIAGNOSIS — N12 PYELONEPHRITIS: ICD-10-CM

## 2023-08-18 DIAGNOSIS — R10.9 ABDOMINAL PAIN: ICD-10-CM

## 2023-08-18 DIAGNOSIS — N39.0 SEPSIS DUE TO URINARY TRACT INFECTION (HCC): ICD-10-CM

## 2023-08-18 DIAGNOSIS — D72.829 LEUCOCYTOSIS: ICD-10-CM

## 2023-08-18 DIAGNOSIS — Z93.59 CHRONIC SUPRAPUBIC CATHETER (HCC): ICD-10-CM

## 2023-08-18 DIAGNOSIS — A41.9 SEPSIS DUE TO URINARY TRACT INFECTION (HCC): ICD-10-CM

## 2023-08-18 LAB
2HR DELTA HS TROPONIN: 0 NG/L
ALBUMIN SERPL BCP-MCNC: 3.8 G/DL (ref 3.5–5)
ALP SERPL-CCNC: 105 U/L (ref 34–104)
ALT SERPL W P-5'-P-CCNC: 12 U/L (ref 7–52)
AMORPH URATE CRY URNS QL MICRO: ABNORMAL
ANION GAP SERPL CALCULATED.3IONS-SCNC: 8 MMOL/L
APTT PPP: 28 SECONDS (ref 23–37)
AST SERPL W P-5'-P-CCNC: 11 U/L (ref 13–39)
BACTERIA UR QL AUTO: ABNORMAL /HPF
BASOPHILS # BLD AUTO: 0.12 THOUSANDS/ÂΜL (ref 0–0.1)
BASOPHILS NFR BLD AUTO: 1 % (ref 0–1)
BILIRUB SERPL-MCNC: 0.48 MG/DL (ref 0.2–1)
BILIRUB UR QL STRIP: NEGATIVE
BNP SERPL-MCNC: 34 PG/ML (ref 0–100)
BUN SERPL-MCNC: 9 MG/DL (ref 5–25)
CALCIUM SERPL-MCNC: 10.1 MG/DL (ref 8.4–10.2)
CARDIAC TROPONIN I PNL SERPL HS: 5 NG/L
CARDIAC TROPONIN I PNL SERPL HS: 5 NG/L
CHLORIDE SERPL-SCNC: 95 MMOL/L (ref 96–108)
CLARITY UR: ABNORMAL
CO2 SERPL-SCNC: 27 MMOL/L (ref 21–32)
COLOR UR: ABNORMAL
CREAT SERPL-MCNC: 0.86 MG/DL (ref 0.6–1.3)
EOSINOPHIL # BLD AUTO: 0.58 THOUSAND/ÂΜL (ref 0–0.61)
EOSINOPHIL NFR BLD AUTO: 3 % (ref 0–6)
ERYTHROCYTE [DISTWIDTH] IN BLOOD BY AUTOMATED COUNT: 12.6 % (ref 11.6–15.1)
GFR SERPL CREATININE-BSD FRML MDRD: 85 ML/MIN/1.73SQ M
GLUCOSE SERPL-MCNC: 190 MG/DL (ref 65–140)
GLUCOSE UR STRIP-MCNC: ABNORMAL MG/DL
GRAN CASTS #/AREA URNS LPF: ABNORMAL /[LPF]
HCT VFR BLD AUTO: 45.5 % (ref 36.5–49.3)
HGB BLD-MCNC: 15.2 G/DL (ref 12–17)
HGB UR QL STRIP.AUTO: ABNORMAL
IMM GRANULOCYTES # BLD AUTO: 0.1 THOUSAND/UL (ref 0–0.2)
IMM GRANULOCYTES NFR BLD AUTO: 1 % (ref 0–2)
INR PPP: 0.96 (ref 0.84–1.19)
KETONES UR STRIP-MCNC: NEGATIVE MG/DL
LACTATE SERPL-SCNC: 1.7 MMOL/L (ref 0.5–2)
LEUKOCYTE ESTERASE UR QL STRIP: ABNORMAL
LYMPHOCYTES # BLD AUTO: 2.83 THOUSANDS/ÂΜL (ref 0.6–4.47)
LYMPHOCYTES NFR BLD AUTO: 15 % (ref 14–44)
MCH RBC QN AUTO: 28.8 PG (ref 26.8–34.3)
MCHC RBC AUTO-ENTMCNC: 33.4 G/DL (ref 31.4–37.4)
MCV RBC AUTO: 86 FL (ref 82–98)
MONOCYTES # BLD AUTO: 1.28 THOUSAND/ÂΜL (ref 0.17–1.22)
MONOCYTES NFR BLD AUTO: 7 % (ref 4–12)
NEUTROPHILS # BLD AUTO: 13.85 THOUSANDS/ÂΜL (ref 1.85–7.62)
NEUTS SEG NFR BLD AUTO: 73 % (ref 43–75)
NITRITE UR QL STRIP: NEGATIVE
NON-SQ EPI CELLS URNS QL MICRO: ABNORMAL /HPF
NRBC BLD AUTO-RTO: 0 /100 WBCS
PH UR STRIP.AUTO: 8 [PH]
PLATELET # BLD AUTO: 434 THOUSANDS/UL (ref 149–390)
PMV BLD AUTO: 7.6 FL (ref 8.9–12.7)
POTASSIUM SERPL-SCNC: 4.1 MMOL/L (ref 3.5–5.3)
PROT SERPL-MCNC: 8 G/DL (ref 6.4–8.4)
PROT UR STRIP-MCNC: ABNORMAL MG/DL
PROTHROMBIN TIME: 12.8 SECONDS (ref 11.6–14.5)
RBC # BLD AUTO: 5.28 MILLION/UL (ref 3.88–5.62)
RBC #/AREA URNS AUTO: ABNORMAL /HPF
SODIUM SERPL-SCNC: 130 MMOL/L (ref 135–147)
SP GR UR STRIP.AUTO: 1.03 (ref 1–1.03)
UROBILINOGEN UR STRIP-ACNC: <2 MG/DL
WBC # BLD AUTO: 18.76 THOUSAND/UL (ref 4.31–10.16)
WBC #/AREA URNS AUTO: ABNORMAL /HPF

## 2023-08-18 PROCEDURE — 36415 COLL VENOUS BLD VENIPUNCTURE: CPT | Performed by: EMERGENCY MEDICINE

## 2023-08-18 PROCEDURE — 74177 CT ABD & PELVIS W/CONTRAST: CPT

## 2023-08-18 PROCEDURE — 99223 1ST HOSP IP/OBS HIGH 75: CPT | Performed by: INTERNAL MEDICINE

## 2023-08-18 PROCEDURE — 83930 ASSAY OF BLOOD OSMOLALITY: CPT | Performed by: INTERNAL MEDICINE

## 2023-08-18 PROCEDURE — 83880 ASSAY OF NATRIURETIC PEPTIDE: CPT | Performed by: EMERGENCY MEDICINE

## 2023-08-18 PROCEDURE — 87077 CULTURE AEROBIC IDENTIFY: CPT | Performed by: EMERGENCY MEDICINE

## 2023-08-18 PROCEDURE — 83605 ASSAY OF LACTIC ACID: CPT | Performed by: EMERGENCY MEDICINE

## 2023-08-18 PROCEDURE — 93005 ELECTROCARDIOGRAM TRACING: CPT

## 2023-08-18 PROCEDURE — 80053 COMPREHEN METABOLIC PANEL: CPT | Performed by: EMERGENCY MEDICINE

## 2023-08-18 PROCEDURE — 85610 PROTHROMBIN TIME: CPT | Performed by: EMERGENCY MEDICINE

## 2023-08-18 PROCEDURE — 87040 BLOOD CULTURE FOR BACTERIA: CPT | Performed by: EMERGENCY MEDICINE

## 2023-08-18 PROCEDURE — 99285 EMERGENCY DEPT VISIT HI MDM: CPT | Performed by: EMERGENCY MEDICINE

## 2023-08-18 PROCEDURE — 84484 ASSAY OF TROPONIN QUANT: CPT | Performed by: EMERGENCY MEDICINE

## 2023-08-18 PROCEDURE — 99285 EMERGENCY DEPT VISIT HI MDM: CPT

## 2023-08-18 PROCEDURE — 81001 URINALYSIS AUTO W/SCOPE: CPT | Performed by: EMERGENCY MEDICINE

## 2023-08-18 PROCEDURE — 71275 CT ANGIOGRAPHY CHEST: CPT

## 2023-08-18 PROCEDURE — 87147 CULTURE TYPE IMMUNOLOGIC: CPT | Performed by: EMERGENCY MEDICINE

## 2023-08-18 PROCEDURE — 84145 PROCALCITONIN (PCT): CPT | Performed by: NURSE PRACTITIONER

## 2023-08-18 PROCEDURE — 85025 COMPLETE CBC W/AUTO DIFF WBC: CPT | Performed by: EMERGENCY MEDICINE

## 2023-08-18 PROCEDURE — 96365 THER/PROPH/DIAG IV INF INIT: CPT

## 2023-08-18 PROCEDURE — 85730 THROMBOPLASTIN TIME PARTIAL: CPT | Performed by: EMERGENCY MEDICINE

## 2023-08-18 PROCEDURE — 87086 URINE CULTURE/COLONY COUNT: CPT | Performed by: EMERGENCY MEDICINE

## 2023-08-18 RX ORDER — CEFEPIME HYDROCHLORIDE 2 G/50ML
2000 INJECTION, SOLUTION INTRAVENOUS ONCE
Status: COMPLETED | OUTPATIENT
Start: 2023-08-18 | End: 2023-08-18

## 2023-08-18 RX ORDER — ONDANSETRON 2 MG/ML
4 INJECTION INTRAMUSCULAR; INTRAVENOUS ONCE
Status: COMPLETED | OUTPATIENT
Start: 2023-08-19 | End: 2023-08-18

## 2023-08-18 RX ORDER — ACETAMINOPHEN 325 MG/1
650 TABLET ORAL ONCE
Status: COMPLETED | OUTPATIENT
Start: 2023-08-18 | End: 2023-08-18

## 2023-08-18 RX ADMIN — MORPHINE SULFATE 2 MG: 2 INJECTION, SOLUTION INTRAMUSCULAR; INTRAVENOUS at 23:49

## 2023-08-18 RX ADMIN — ACETAMINOPHEN 650 MG: 325 TABLET ORAL at 23:47

## 2023-08-18 RX ADMIN — CEFEPIME HYDROCHLORIDE 2000 MG: 2 INJECTION, SOLUTION INTRAVENOUS at 23:01

## 2023-08-18 RX ADMIN — IOHEXOL 100 ML: 350 INJECTION, SOLUTION INTRAVENOUS at 22:04

## 2023-08-18 RX ADMIN — ONDANSETRON 4 MG: 2 INJECTION INTRAMUSCULAR; INTRAVENOUS at 23:54

## 2023-08-18 NOTE — Clinical Note
Case was discussed with [facility] and the patient's admission status was agreed to be Admission Status: inpatient status to the service of [facility].

## 2023-08-19 LAB
ANION GAP SERPL CALCULATED.3IONS-SCNC: 5 MMOL/L
ATRIAL RATE: 108 BPM
ATRIAL RATE: 114 BPM
BASOPHILS # BLD AUTO: 0.12 THOUSANDS/ÂΜL (ref 0–0.1)
BASOPHILS NFR BLD AUTO: 1 % (ref 0–1)
BUN SERPL-MCNC: 7 MG/DL (ref 5–25)
CALCIUM SERPL-MCNC: 9.3 MG/DL (ref 8.4–10.2)
CHLORIDE SERPL-SCNC: 98 MMOL/L (ref 96–108)
CO2 SERPL-SCNC: 27 MMOL/L (ref 21–32)
CREAT SERPL-MCNC: 0.91 MG/DL (ref 0.6–1.3)
EOSINOPHIL # BLD AUTO: 0.58 THOUSAND/ÂΜL (ref 0–0.61)
EOSINOPHIL NFR BLD AUTO: 3 % (ref 0–6)
ERYTHROCYTE [DISTWIDTH] IN BLOOD BY AUTOMATED COUNT: 12.5 % (ref 11.6–15.1)
GFR SERPL CREATININE-BSD FRML MDRD: 82 ML/MIN/1.73SQ M
GLUCOSE SERPL-MCNC: 185 MG/DL (ref 65–140)
GLUCOSE SERPL-MCNC: 193 MG/DL (ref 65–140)
GLUCOSE SERPL-MCNC: 228 MG/DL (ref 65–140)
GLUCOSE SERPL-MCNC: 251 MG/DL (ref 65–140)
GLUCOSE SERPL-MCNC: 352 MG/DL (ref 65–140)
HCT VFR BLD AUTO: 38.5 % (ref 36.5–49.3)
HGB BLD-MCNC: 12.8 G/DL (ref 12–17)
IMM GRANULOCYTES # BLD AUTO: 0.09 THOUSAND/UL (ref 0–0.2)
IMM GRANULOCYTES NFR BLD AUTO: 1 % (ref 0–2)
LYMPHOCYTES # BLD AUTO: 4.14 THOUSANDS/ÂΜL (ref 0.6–4.47)
LYMPHOCYTES NFR BLD AUTO: 24 % (ref 14–44)
MCH RBC QN AUTO: 28.8 PG (ref 26.8–34.3)
MCHC RBC AUTO-ENTMCNC: 33.2 G/DL (ref 31.4–37.4)
MCV RBC AUTO: 87 FL (ref 82–98)
MONOCYTES # BLD AUTO: 1.36 THOUSAND/ÂΜL (ref 0.17–1.22)
MONOCYTES NFR BLD AUTO: 8 % (ref 4–12)
NEUTROPHILS # BLD AUTO: 10.76 THOUSANDS/ÂΜL (ref 1.85–7.62)
NEUTS SEG NFR BLD AUTO: 63 % (ref 43–75)
NRBC BLD AUTO-RTO: 0 /100 WBCS
OSMOLALITY UR/SERPL-RTO: 282 MMOL/KG (ref 282–298)
OSMOLALITY UR: 366 MMOL/KG
P AXIS: 64 DEGREES
P AXIS: 69 DEGREES
PLATELET # BLD AUTO: 396 THOUSANDS/UL (ref 149–390)
PMV BLD AUTO: 7.6 FL (ref 8.9–12.7)
POTASSIUM SERPL-SCNC: 4.1 MMOL/L (ref 3.5–5.3)
PR INTERVAL: 158 MS
PR INTERVAL: 162 MS
PROCALCITONIN SERPL-MCNC: 0.08 NG/ML
QRS AXIS: 60 DEGREES
QRS AXIS: 74 DEGREES
QRSD INTERVAL: 90 MS
QRSD INTERVAL: 92 MS
QT INTERVAL: 336 MS
QT INTERVAL: 362 MS
QTC INTERVAL: 463 MS
QTC INTERVAL: 485 MS
RBC # BLD AUTO: 4.44 MILLION/UL (ref 3.88–5.62)
SODIUM 24H UR-SCNC: 68 MOL/L
SODIUM SERPL-SCNC: 130 MMOL/L (ref 135–147)
T WAVE AXIS: 48 DEGREES
T WAVE AXIS: 49 DEGREES
URATE SERPL-MCNC: 3 MG/DL (ref 3.5–8.5)
VENTRICULAR RATE: 108 BPM
VENTRICULAR RATE: 114 BPM
WBC # BLD AUTO: 17.05 THOUSAND/UL (ref 4.31–10.16)

## 2023-08-19 PROCEDURE — 85025 COMPLETE CBC W/AUTO DIFF WBC: CPT | Performed by: NURSE PRACTITIONER

## 2023-08-19 PROCEDURE — 93010 ELECTROCARDIOGRAM REPORT: CPT | Performed by: INTERNAL MEDICINE

## 2023-08-19 PROCEDURE — 84300 ASSAY OF URINE SODIUM: CPT | Performed by: INTERNAL MEDICINE

## 2023-08-19 PROCEDURE — 99222 1ST HOSP IP/OBS MODERATE 55: CPT | Performed by: UROLOGY

## 2023-08-19 PROCEDURE — 84550 ASSAY OF BLOOD/URIC ACID: CPT | Performed by: INTERNAL MEDICINE

## 2023-08-19 PROCEDURE — 83935 ASSAY OF URINE OSMOLALITY: CPT | Performed by: INTERNAL MEDICINE

## 2023-08-19 PROCEDURE — 80048 BASIC METABOLIC PNL TOTAL CA: CPT | Performed by: NURSE PRACTITIONER

## 2023-08-19 PROCEDURE — 99232 SBSQ HOSP IP/OBS MODERATE 35: CPT | Performed by: INTERNAL MEDICINE

## 2023-08-19 PROCEDURE — 82948 REAGENT STRIP/BLOOD GLUCOSE: CPT

## 2023-08-19 RX ORDER — LATANOPROST 50 UG/ML
1 SOLUTION/ DROPS OPHTHALMIC
Status: DISCONTINUED | OUTPATIENT
Start: 2023-08-19 | End: 2023-08-22 | Stop reason: HOSPADM

## 2023-08-19 RX ORDER — SERTRALINE HYDROCHLORIDE 25 MG/1
25 TABLET, FILM COATED ORAL
Status: DISCONTINUED | OUTPATIENT
Start: 2023-08-19 | End: 2023-08-22 | Stop reason: HOSPADM

## 2023-08-19 RX ORDER — LANOLIN ALCOHOL/MO/W.PET/CERES
6 CREAM (GRAM) TOPICAL
Status: DISCONTINUED | OUTPATIENT
Start: 2023-08-19 | End: 2023-08-22 | Stop reason: HOSPADM

## 2023-08-19 RX ORDER — CLOPIDOGREL BISULFATE 75 MG/1
75 TABLET ORAL DAILY
Status: DISCONTINUED | OUTPATIENT
Start: 2023-08-19 | End: 2023-08-22 | Stop reason: HOSPADM

## 2023-08-19 RX ORDER — GABAPENTIN 300 MG/1
300 CAPSULE ORAL 2 TIMES DAILY
Status: DISCONTINUED | OUTPATIENT
Start: 2023-08-19 | End: 2023-08-22 | Stop reason: HOSPADM

## 2023-08-19 RX ORDER — PANTOPRAZOLE SODIUM 40 MG/1
40 TABLET, DELAYED RELEASE ORAL
Status: DISCONTINUED | OUTPATIENT
Start: 2023-08-19 | End: 2023-08-22 | Stop reason: HOSPADM

## 2023-08-19 RX ORDER — SODIUM CHLORIDE 9 MG/ML
100 INJECTION, SOLUTION INTRAVENOUS CONTINUOUS
Status: DISCONTINUED | OUTPATIENT
Start: 2023-08-19 | End: 2023-08-19

## 2023-08-19 RX ORDER — DIPHENHYDRAMINE HCL 25 MG
25 TABLET ORAL EVERY 6 HOURS PRN
Status: DISCONTINUED | OUTPATIENT
Start: 2023-08-19 | End: 2023-08-22 | Stop reason: HOSPADM

## 2023-08-19 RX ORDER — PROPRANOLOL HCL 60 MG
60 CAPSULE, EXTENDED RELEASE 24HR ORAL DAILY
Status: DISCONTINUED | OUTPATIENT
Start: 2023-08-19 | End: 2023-08-22 | Stop reason: HOSPADM

## 2023-08-19 RX ORDER — ACETAMINOPHEN 325 MG/1
975 TABLET ORAL EVERY 6 HOURS PRN
Status: DISCONTINUED | OUTPATIENT
Start: 2023-08-19 | End: 2023-08-22 | Stop reason: HOSPADM

## 2023-08-19 RX ORDER — OXYCODONE HYDROCHLORIDE 5 MG/1
5 TABLET ORAL 4 TIMES DAILY PRN
Status: DISCONTINUED | OUTPATIENT
Start: 2023-08-19 | End: 2023-08-22 | Stop reason: HOSPADM

## 2023-08-19 RX ORDER — ENOXAPARIN SODIUM 100 MG/ML
40 INJECTION SUBCUTANEOUS DAILY
Status: DISCONTINUED | OUTPATIENT
Start: 2023-08-19 | End: 2023-08-22 | Stop reason: HOSPADM

## 2023-08-19 RX ORDER — SIMETHICONE 80 MG
80 TABLET,CHEWABLE ORAL 4 TIMES DAILY PRN
Status: DISCONTINUED | OUTPATIENT
Start: 2023-08-19 | End: 2023-08-22 | Stop reason: HOSPADM

## 2023-08-19 RX ORDER — INSULIN LISPRO 100 [IU]/ML
1-6 INJECTION, SOLUTION INTRAVENOUS; SUBCUTANEOUS
Status: DISCONTINUED | OUTPATIENT
Start: 2023-08-19 | End: 2023-08-22 | Stop reason: HOSPADM

## 2023-08-19 RX ORDER — AMLODIPINE BESYLATE 10 MG/1
10 TABLET ORAL DAILY
Status: DISCONTINUED | OUTPATIENT
Start: 2023-08-19 | End: 2023-08-22 | Stop reason: HOSPADM

## 2023-08-19 RX ORDER — ATORVASTATIN CALCIUM 80 MG/1
80 TABLET, FILM COATED ORAL DAILY
Status: DISCONTINUED | OUTPATIENT
Start: 2023-08-19 | End: 2023-08-22 | Stop reason: HOSPADM

## 2023-08-19 RX ORDER — CEFTRIAXONE 1 G/50ML
1000 INJECTION, SOLUTION INTRAVENOUS EVERY 24 HOURS
Status: DISCONTINUED | OUTPATIENT
Start: 2023-08-19 | End: 2023-08-22 | Stop reason: HOSPADM

## 2023-08-19 RX ORDER — ONDANSETRON 2 MG/ML
4 INJECTION INTRAMUSCULAR; INTRAVENOUS EVERY 6 HOURS PRN
Status: DISCONTINUED | OUTPATIENT
Start: 2023-08-19 | End: 2023-08-22 | Stop reason: HOSPADM

## 2023-08-19 RX ORDER — MAGNESIUM HYDROXIDE/ALUMINUM HYDROXICE/SIMETHICONE 120; 1200; 1200 MG/30ML; MG/30ML; MG/30ML
30 SUSPENSION ORAL EVERY 6 HOURS PRN
Status: DISCONTINUED | OUTPATIENT
Start: 2023-08-19 | End: 2023-08-22 | Stop reason: HOSPADM

## 2023-08-19 RX ORDER — ALPRAZOLAM 0.25 MG/1
0.25 TABLET ORAL 2 TIMES DAILY PRN
Status: DISCONTINUED | OUTPATIENT
Start: 2023-08-19 | End: 2023-08-22 | Stop reason: HOSPADM

## 2023-08-19 RX ORDER — ALBUTEROL SULFATE 90 UG/1
2 AEROSOL, METERED RESPIRATORY (INHALATION) EVERY 6 HOURS PRN
Status: DISCONTINUED | OUTPATIENT
Start: 2023-08-19 | End: 2023-08-22 | Stop reason: HOSPADM

## 2023-08-19 RX ORDER — GABAPENTIN 300 MG/1
600 CAPSULE ORAL
Status: DISCONTINUED | OUTPATIENT
Start: 2023-08-19 | End: 2023-08-22 | Stop reason: HOSPADM

## 2023-08-19 RX ORDER — AMOXICILLIN 250 MG
2 CAPSULE ORAL
Status: DISCONTINUED | OUTPATIENT
Start: 2023-08-19 | End: 2023-08-22 | Stop reason: HOSPADM

## 2023-08-19 RX ORDER — POLYETHYLENE GLYCOL 3350 17 G/17G
17 POWDER, FOR SOLUTION ORAL DAILY PRN
Status: DISCONTINUED | OUTPATIENT
Start: 2023-08-19 | End: 2023-08-22 | Stop reason: HOSPADM

## 2023-08-19 RX ADMIN — Medication 2.5 MG: at 02:07

## 2023-08-19 RX ADMIN — INSULIN LISPRO 2 UNITS: 100 INJECTION, SOLUTION INTRAVENOUS; SUBCUTANEOUS at 17:13

## 2023-08-19 RX ADMIN — Medication 6 MG: at 01:38

## 2023-08-19 RX ADMIN — AMPICILLIN SODIUM 1000 MG: 1 INJECTION, POWDER, FOR SOLUTION INTRAMUSCULAR; INTRAVENOUS at 09:38

## 2023-08-19 RX ADMIN — AMPICILLIN SODIUM 1000 MG: 1 INJECTION, POWDER, FOR SOLUTION INTRAMUSCULAR; INTRAVENOUS at 02:00

## 2023-08-19 RX ADMIN — LATANOPROST 1 DROP: 50 SOLUTION OPHTHALMIC at 21:25

## 2023-08-19 RX ADMIN — SENNOSIDES AND DOCUSATE SODIUM 2 TABLET: 50; 8.6 TABLET ORAL at 01:38

## 2023-08-19 RX ADMIN — PROPRANOLOL HYDROCHLORIDE 60 MG: 60 CAPSULE, EXTENDED RELEASE ORAL at 09:38

## 2023-08-19 RX ADMIN — CEFTRIAXONE 1000 MG: 1 INJECTION, SOLUTION INTRAVENOUS at 11:59

## 2023-08-19 RX ADMIN — INSULIN LISPRO 3 UNITS: 100 INJECTION, SOLUTION INTRAVENOUS; SUBCUTANEOUS at 12:21

## 2023-08-19 RX ADMIN — SERTRALINE HYDROCHLORIDE 25 MG: 25 TABLET ORAL at 21:25

## 2023-08-19 RX ADMIN — GABAPENTIN 300 MG: 300 CAPSULE ORAL at 09:38

## 2023-08-19 RX ADMIN — AMLODIPINE BESYLATE 10 MG: 10 TABLET ORAL at 09:38

## 2023-08-19 RX ADMIN — ATORVASTATIN CALCIUM 80 MG: 80 TABLET, FILM COATED ORAL at 09:38

## 2023-08-19 RX ADMIN — AMPICILLIN SODIUM 1000 MG: 1 INJECTION, POWDER, FOR SOLUTION INTRAMUSCULAR; INTRAVENOUS at 20:15

## 2023-08-19 RX ADMIN — LATANOPROST 1 DROP: 50 SOLUTION OPHTHALMIC at 02:07

## 2023-08-19 RX ADMIN — ENOXAPARIN SODIUM 40 MG: 100 INJECTION SUBCUTANEOUS at 09:38

## 2023-08-19 RX ADMIN — PANTOPRAZOLE SODIUM 40 MG: 40 TABLET, DELAYED RELEASE ORAL at 05:39

## 2023-08-19 RX ADMIN — GABAPENTIN 300 MG: 300 CAPSULE ORAL at 17:13

## 2023-08-19 RX ADMIN — INSULIN LISPRO 1 UNITS: 100 INJECTION, SOLUTION INTRAVENOUS; SUBCUTANEOUS at 09:39

## 2023-08-19 RX ADMIN — SERTRALINE HYDROCHLORIDE 25 MG: 25 TABLET ORAL at 01:39

## 2023-08-19 RX ADMIN — AMPICILLIN SODIUM 1000 MG: 1 INJECTION, POWDER, FOR SOLUTION INTRAMUSCULAR; INTRAVENOUS at 15:32

## 2023-08-19 RX ADMIN — SENNOSIDES AND DOCUSATE SODIUM 2 TABLET: 50; 8.6 TABLET ORAL at 21:25

## 2023-08-19 RX ADMIN — GABAPENTIN 600 MG: 300 CAPSULE ORAL at 21:25

## 2023-08-19 RX ADMIN — SODIUM CHLORIDE 100 ML/HR: 0.9 INJECTION, SOLUTION INTRAVENOUS at 02:22

## 2023-08-19 RX ADMIN — GABAPENTIN 600 MG: 300 CAPSULE ORAL at 01:38

## 2023-08-19 RX ADMIN — CLOPIDOGREL BISULFATE 75 MG: 75 TABLET ORAL at 09:38

## 2023-08-19 RX ADMIN — INSULIN LISPRO 6 UNITS: 100 INJECTION, SOLUTION INTRAVENOUS; SUBCUTANEOUS at 21:25

## 2023-08-19 RX ADMIN — Medication 6 MG: at 21:25

## 2023-08-19 NOTE — ASSESSMENT & PLAN NOTE
· Longstanding history of MS since 1960's, not maintained on DMD.  Wheelchair-bound  · Follows outpatient with [facility]

## 2023-08-19 NOTE — ASSESSMENT & PLAN NOTE
· Sepsis POA with leukocytosis and tachycardia  · Recent admission for urosepsis June 2023  · CTA A/P showing cystitis and bilateral pyelonephritis R>L.   No hydronephrosis  · Hx of neurogenic bladder managed with SPC  · Continue IV Rocephin and ampicillin as previously recommended by infectious disease  · Follow-up blood cultures urine cultures  · Trend Fever/WBC curve

## 2023-08-19 NOTE — H&P
[address] [facility]  H&P  Name: Landy Sparks 75 y.o. male I MRN: [id number]  Unit/Bed#: E4 -01 I Date of Admission: 8/18/2023   Date of Service: 8/19/2023 I Hospital Day: 1      Assessment/Plan   * Sepsis due to urinary tract infection [facility])  Assessment & Plan  · Sepsis POA with leukocytosis and tachycardia  · Recent admission for urosepsis June 2023  · CTA A/P showing cystitis and bilateral pyelonephritis R>L. No hydronephrosis  · Hx of neurogenic bladder managed with [facility]  · UA orange turbid UO with large leuks and innumerable WBC, no nitrites  · Previous cultures grew E coli and E faecalis susceptible to IV ceftriaxone and ampicillin  · We will treat with IV ceftriaxone 1 g daily, IV ampicillin 1 g every 6 hours  · Follow urine culture and blood cultures  · Consider [facility] exchange. Urology consult  · Consider ID consult    Pyelonephritis  Assessment & Plan  · See plan sepsis due to UTI    History of CVA (cerebrovascular accident)  Assessment & Plan  · History of CVA in 2018  · On Plavix and statin    Type 2 diabetes mellitus with hyperglycemia, without long-term current use of insulin [facility])  Assessment & Plan  Lab Results   Component Value Date    HGBA1C 9.2 (H) 06/03/2023     · Maintained on Trulicity outpatient  · Sliding scale inpatient. ADA diet    No results for input(s): "POCGLU" in the last 72 hours. Blood Sugar Average: Last 72 hrs:      Multiple sclerosis ([address])  Assessment & Plan  · Longstanding history of MS since 1960's, not maintained on DMD.  Wheelchair-bound  · Follow[facility]    Chronic suprapubic catheter [facility])  Assessment & Plan  · History of MS with neurogenic bladder managed with [facility]  · Ostomy care. Monitor urine output    Hyponatremia  Assessment & Plan  · Mild hyponatremia, sodium correction for hyperglycemia 131  · IV hydration. Repeat a.m.  BMP    Primary hypertension  Assessment & Plan  · Continue amlodipine    Hyperlipidemia  Assessment & Plan  · Statin    Generalized anxiety disorder  Assessment & Plan  · Xanax 0.25 mg bid prn, verified on PDMP    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HCC)  Assessment & Plan  · S/P stent assisted coil embolization of a basilar artery apex aneurysm in 2015   · Stable, follows with neurosurgery as needed       VTE Prophylaxis: Enoxaparin (Lovenox)  / sequential compression device   Code Status: FC  POLST: POLST is not applicable to this patient  Discussion with family:     Anticipated Length of Stay:  Patient will be admitted on an Inpatient basis with an anticipated length of stay of  > 2 midnights. Justification for Hospital Stay: urosepsis    Total Time for Visit, including Counseling / Coordination of Care: 60 minutes. Greater than 50% of this total time spent on direct patient counseling and coordination of care. Chief Complaint:   "Johanna Ho in my stomach and pain"    History of Present Illness:    Melania Douglass is a 75 y.o. male who presents with c/o abdominal pain and feeling sick in his stomach. Poor historian. States he felt jittery and nauseous with loss of appetite. History of neurogenic bladder managed with [facility], previous admission for urosepsis. Reports good urine output, denies dysuria, suprapubic pain or pressure, low back pain, changes in urine output or malodorous UO. Review of Systems:    Review of Systems   Constitutional: Positive for appetite change and chills. Negative for fever. JESS   HENT: Negative. Respiratory: Negative. Cardiovascular: Negative. Gastrointestinal: Positive for abdominal pain. Genitourinary: Negative. Musculoskeletal: Positive for arthralgias. B/LLE pain   Skin: Negative. Neurological: Positive for weakness. Psychiatric/Behavioral: Positive for confusion.        Past Medical and Surgical History:     Past Medical History:   Diagnosis Date   • Acute laryngitis    • Acute nonsuppurative otitis media, unspecified laterality    • Arm weakness    • Arthritis    • 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[facility])    • Bladder infection    • Bronchitis    • Constipation    • Cough    • Diabetes mellitus ([facility])    • Dizziness    • Dysfunction of eustachian tube    • Erectile dysfunction of non-organic origin    • Fatigue    • Glaucoma    • Hiatal hernia    • Hypertension    • Imbalance    • Leg muscle spasm    • MS (multiple sclerosis) ([facility])    • Nephrolithiasis    • Neurogenic bladder    • No natural teeth    • Sinus pain    • Spinal stenosis    • Strain of thoracic region    • Stroke [facility])    • Suprapubic catheter ([address])        Past Surgical History:   Procedure Laterality Date   • APPENDECTOMY     • BRAIN SURGERY      Coil placed in aneurysm   • CYSTOSCOPY     • CYSTOSCOPY     • CYSTOSCOPY  06/11/2018   • CYSTOSCOPY  01/15/2021   • EYE SURGERY      transscleral cyclophotocoagulation noncontact YAG laser   • MYRINGOTOMY      with ventilation tube insertion   • NE LITHOLAPAXY SMPL/SM <2.5 CM N/A 5/7/2019    Procedure: CYSTOSCOPY, holmium laser litholapaxy of bladder stones, EXCHANGE OF SP TUBE;  Surgeon: Ivan Peoples MD;  Location: [facility];  Service: Urology   • SUPRAPUBIC CATHETER INSERTION         Meds/Allergies:    Prior to Admission medications    Medication Sig Start Date End Date Taking?  Authorizing Provider   albuterol (2.5 mg/3 mL) 0.083 % nebulizer solution Take 2.5 mg by nebulization every 6 (six) hours as needed for wheezing or shortness of breath   Yes Historical Provider, MD   albuterol (PROVENTIL HFA,VENTOLIN HFA) 90 mcg/act inhaler Inhale 2 puffs every 6 (six) hours as needed for wheezing   Yes Historical Provider, MD   ALPRAZolam (XANAX) 0.25 mg tablet Take 0.25 mg by mouth 2 (two) times a day as needed for anxiety or sleep    Yes Historical Provider, MD   amLODIPine-atorvastatin (CADUET) 10-80 MG per tablet Take 1 tablet by mouth daily   Yes Historical Provider, MD   clopidogrel (PLAVIX) 75 mg tablet Take 1 tablet (75 mg total) by mouth daily 10/27/18  Yes Shivam Gorman MD   Dulaglutide (Trulicity) 5.01 JG/0.2RH SOPN Inject 0.75 mg under the skin once a week   Yes Historical Provider, MD   Ergocalciferol (VITAMIN D2 PO) Take 50,000 Units by mouth once a week   Yes Historical Provider, MD   furosemide (LASIX) 40 mg tablet Take 40 mg by mouth daily   Yes Historical Provider, MD   gabapentin (NEURONTIN) 300 mg capsule Take 1 capsule (300 mg total) by mouth 2 (two) times a day 6/3/21  Yes Phan Vazquez DO   gabapentin (NEURONTIN) 300 mg capsule Take 2 capsules (600 mg total) by mouth daily at bedtime 6/3/21  Yes Phan Vazquez,    latanoprost (XALATAN) 0.005 % ophthalmic solution Administer 1 drop to both eyes daily at bedtime   Yes Historical Provider, MD   pantoprazole (PROTONIX) 40 mg tablet TAKE 1 TABLET TWICE DAILY 30 MINUTES BEFORE BREAKFAST AND DINNER. 3/13/18  Yes Pineda Vega DO   polyethylene glycol (MIRALAX) 17 g packet Take 17 g by mouth daily as needed   Yes Historical Provider, MD   potassium chloride (Klor-Con) 10 mEq tablet Take 10 mEq by mouth 2 (two) times a day   Yes Historical Provider, MD   predniSONE 20 mg tablet Take 2 tablets (40 mg total) by mouth daily 6/30/23  Yes Skylar Rivera MD   propranolol (INDERAL LA) 60 mg 24 hr capsule Take 60 mg by mouth daily   Yes Historical Provider, MD   senna-docusate sodium (SENOKOT S) 8.6-50 mg per tablet Take 2 tablets by mouth daily at bedtime   Yes Historical Provider, MD   sertraline (ZOLOFT) 25 mg tablet Take 25 mg by mouth daily at bedtime   Yes Historical Provider, MD   melatonin 3 mg Take 3 mg by mouth daily at bedtime as needed    Historical Provider, MD     I have reviewed home medications with patient personally. Allergies:    Allergies   Allergen Reactions   • Cephalexin Rash       Social History:     Marital Status: Single   Occupation: retired (per patient)  Patient Pre-hospital Living Situation: resides with sister and brother  Patient Pre-hospital Level of Mobility: w/c bound  Patient Pre-hospital Diet Restrictions:   Substance Use History:   Social History     Substance and Sexual Activity   Alcohol Use Not Currently     Social History     Tobacco Use   Smoking Status Former   • Packs/day: 0.50   • Years: 31.00   • Total pack years: 15.50   • Types: Cigarettes   • Start date: 55   • Quit date: 18   • Years since quittin.6   Smokeless Tobacco Never     Social History     Substance and Sexual Activity   Drug Use No       Family History:    Family History   Problem Relation Age of Onset   • Heart attack Mother    • Stroke Mother    • Heart attack Father    • Anuerysm Father         In Stomach        Physical Exam:     Vitals:   Blood Pressure: 156/90 (23)  Pulse: (!) 108 (23)  Temperature: 97.6 °F (36.4 °C) (23)  Temp Source: Temporal (23)  Respirations: 20 (23)  Height: 5' 4" (162.6 cm) (23)  Weight - Scale: 72.3 kg (159 lb 6.3 oz) (23)  SpO2: 98 % (23 0036)    Physical Exam  Constitutional:       General: He is not in acute distress. Appearance: Normal appearance. He is normal weight. He is not ill-appearing, toxic-appearing or diaphoretic. Comments: unkempt   HENT:      Head: Normocephalic and atraumatic. Mouth/Throat:      Mouth: Mucous membranes are dry. Eyes:      Conjunctiva/sclera: Conjunctivae normal.   Cardiovascular:      Rate and Rhythm: Normal rate and regular rhythm. Heart sounds: Normal heart sounds. Pulmonary:      Effort: Pulmonary effort is normal.      Breath sounds: Normal breath sounds. Abdominal:      General: Bowel sounds are normal.      Palpations: Abdomen is soft. Genitourinary:     Comments: Clear cloudy urine output, no suprapubic tenderness, crusting around ostomy site  Skin:     General: Skin is dry. Coloration: Skin is pale.    Neurological:      Mental Status: He is alert and oriented to person, place, and time. Comments: Some forgetfulness   Psychiatric:         Thought Content: Thought content normal.         Judgment: Judgment normal.      Comments: Blunted affect       Additional Data:     Lab Results: I have personally reviewed pertinent reports. Results from last 7 days   Lab Units 08/18/23  2105   WBC Thousand/uL 18.76*   HEMOGLOBIN g/dL 15.2   HEMATOCRIT % 45.5   PLATELETS Thousands/uL 434*   NEUTROS PCT % 73   LYMPHS PCT % 15   MONOS PCT % 7   EOS PCT % 3     Results from last 7 days   Lab Units 08/18/23  2105   SODIUM mmol/L 130*   POTASSIUM mmol/L 4.1   CHLORIDE mmol/L 95*   CO2 mmol/L 27   BUN mg/dL 9   CREATININE mg/dL 0.86   ANION GAP mmol/L 8   CALCIUM mg/dL 10.1   ALBUMIN g/dL 3.8   TOTAL BILIRUBIN mg/dL 0.48   ALK PHOS U/L 105*   ALT U/L 12   AST U/L 11*   GLUCOSE RANDOM mg/dL 190*     Results from last 7 days   Lab Units 08/18/23  2105   INR  0.96             Results from last 7 days   Lab Units 08/18/23  2158   LACTIC ACID mmol/L 1.7       Imaging: I have personally reviewed pertinent reports. PE Study with CT Abdomen and Pelvis with contrast   Final Result by Amy Francois MD (08/18 5833)      Bilateral right greater than left pyelonephritis      Cystitis               Workstation performed: [id number]             EKG, Pathology, and Other Studies Reviewed on Admission:   · ct    Allscripts / Epic Records Reviewed: Yes     ** Please Note: This note has been constructed using a voice recognition system.  **

## 2023-08-19 NOTE — ASSESSMENT & PLAN NOTE
· History of MS with neurogenic bladder managed with [facility]  · Urology consult for [facility] exchange in setting of infection

## 2023-08-19 NOTE — ASSESSMENT & PLAN NOTE
· S/P stent assisted coil embolization of a basilar artery apex aneurysm in 2015   · Stable, follows with neurosurgery as needed

## 2023-08-19 NOTE — ASSESSMENT & PLAN NOTE
· History of MS with neurogenic bladder managed with [facility]  · Ostomy care.   Monitor urine output

## 2023-08-19 NOTE — PLAN OF CARE
Problem: Knowledge Deficit  Goal: Patient/family/caregiver demonstrates understanding of disease process, treatment plan, medications, and discharge instructions  Description: Complete learning assessment and assess knowledge base.   Interventions:  - Provide teaching at level of understanding  - Provide teaching via preferred learning methods  Outcome: Progressing     Problem: INFECTION - ADULT  Goal: Absence or prevention of progression during hospitalization  Description: INTERVENTIONS:  - Assess and monitor for signs and symptoms of infection  - Monitor lab/diagnostic results  - Monitor all insertion sites, i.e. indwelling lines, tubes, and drains  - Monitor endotracheal if appropriate and nasal secretions for changes in amount and color  - [place] appropriate cooling/warming therapies per order  - Administer medications as ordered  - Instruct and encourage patient and family to use good hand hygiene technique  - Identify and instruct in appropriate isolation precautions for identified infection/condition  Outcome: Progressing     Problem: PAIN - ADULT  Goal: Verbalizes/displays adequate comfort level or baseline comfort level  Description: Interventions:  - Encourage patient to monitor pain and request assistance  - Assess pain using appropriate pain scale  - Administer analgesics based on type and severity of pain and evaluate response  - Implement non-pharmacological measures as appropriate and evaluate response  - Consider cultural and social influences on pain and pain management  - Notify physician/advanced practitioner if interventions unsuccessful or patient reports new pain  Outcome: Progressing

## 2023-08-19 NOTE — ASSESSMENT & PLAN NOTE
· Mild hyponatremia  · No improvement with IV fluid hydration  · We will check fluid studies  · Possibly SIADH in setting of SSRI

## 2023-08-19 NOTE — ED PROVIDER NOTES
History  Chief Complaint   Patient presents with   • Shortness of Breath     States SOB for last 2 hrs. Also complaining of nausea. Denies abd pain or chest pain. History provided by:  Patient   used: No    Shortness of Breath  Severity:  Moderate  Onset quality:  Gradual  Duration:  2 hours  Timing:  Intermittent  Progression:  Waxing and waning  Chronicity:  New  Context comment:  Started with SOB, upper abdominal pain about 2 hrs back  Relieved by:  Nothing  Worsened by:  Nothing  Ineffective treatments:  None tried  Associated symptoms: no abdominal pain, no chest pain, no cough, no fever, no headaches, no neck pain, no rash, no sore throat and no vomiting    Risk factors comment:  DM, MS, Suprapubic cathter      Prior to Admission Medications   Prescriptions Last Dose Informant Patient Reported? Taking?    ALPRAZolam (XANAX) 0.25 mg tablet  Self Yes Yes   Sig: Take 0.25 mg by mouth 2 (two) times a day as needed for anxiety or sleep    Dulaglutide (Trulicity) 2.54 BK/2.5EA SOPN  Self Yes Yes   Sig: Inject 0.75 mg under the skin once a week   Ergocalciferol (VITAMIN D2 PO)  Self Yes Yes   Sig: Take 50,000 Units by mouth once a week   albuterol (2.5 mg/3 mL) 0.083 % nebulizer solution  Self Yes Yes   Sig: Take 2.5 mg by nebulization every 6 (six) hours as needed for wheezing or shortness of breath   albuterol (PROVENTIL HFA,VENTOLIN HFA) 90 mcg/act inhaler  Self Yes Yes   Sig: Inhale 2 puffs every 6 (six) hours as needed for wheezing   amLODIPine-atorvastatin (CADUET) 10-80 MG per tablet  Self Yes Yes   Sig: Take 1 tablet by mouth daily   clopidogrel (PLAVIX) 75 mg tablet  Self No Yes   Sig: Take 1 tablet (75 mg total) by mouth daily   furosemide (LASIX) 40 mg tablet  Self Yes Yes   Sig: Take 40 mg by mouth daily   gabapentin (NEURONTIN) 300 mg capsule  Self No Yes   Sig: Take 1 capsule (300 mg total) by mouth 2 (two) times a day   gabapentin (NEURONTIN) 300 mg capsule   No Yes   Sig: Take 2 capsules (600 mg total) by mouth daily at bedtime   latanoprost (XALATAN) 0.005 % ophthalmic solution  Self Yes Yes   Sig: Administer 1 drop to both eyes daily at bedtime   melatonin 3 mg  Self Yes No   Sig: Take 3 mg by mouth daily at bedtime as needed   pantoprazole (PROTONIX) 40 mg tablet   No Yes   Sig: TAKE 1 TABLET TWICE DAILY 30 MINUTES BEFORE BREAKFAST AND DINNER.   polyethylene glycol (MIRALAX) 17 g packet   Yes Yes   Sig: Take 17 g by mouth daily as needed   potassium chloride (Klor-Con) 10 mEq tablet   Yes Yes   Sig: Take 10 mEq by mouth 2 (two) times a day   predniSONE 20 mg tablet   No Yes   Sig: Take 2 tablets (40 mg total) by mouth daily   propranolol (INDERAL LA) 60 mg 24 hr capsule   Yes Yes   Sig: Take 60 mg by mouth daily   senna-docusate sodium (SENOKOT S) 8.6-50 mg per tablet   Yes Yes   Sig: Take 2 tablets by mouth daily at bedtime   sertraline (ZOLOFT) 25 mg tablet   Yes Yes   Sig: Take 25 mg by mouth daily at bedtime      Facility-Administered Medications: None       Past Medical History:   Diagnosis Date   • Acute laryngitis    • Acute nonsuppurative otitis media, unspecified laterality    • Arm weakness    • Arthritis    • 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HCC)    • Bladder infection    • Bronchitis    • Constipation    • Cough    • Diabetes mellitus (HCC)    • Dizziness    • Dysfunction of eustachian tube    • Erectile dysfunction of non-organic origin    • Fatigue    • Glaucoma    • Hiatal hernia    • Hypertension    • Imbalance    • Leg muscle spasm    • MS (multiple sclerosis) (formerly [facility])    • Nephrolithiasis    • Neurogenic bladder    • No natural teeth    • Sinus pain    • Spinal stenosis    • Strain of thoracic region    • Stroke [facility])    • Suprapubic catheter [facility])        Past Surgical History:   Procedure Laterality Date   • APPENDECTOMY     • BRAIN SURGERY      Coil placed in aneurysm   • CYSTOSCOPY     • CYSTOSCOPY     • CYSTOSCOPY  06/11/2018   • CYSTOSCOPY  01/15/2021   • EYE SURGERY transscleral cyclophotocoagulation noncontact YAG laser   • MYRINGOTOMY      with ventilation tube insertion   • MI LITHOLAPAXY SMPL/SM <2.5 CM N/A 2019    Procedure: CYSTOSCOPY, holmium laser litholapaxy of bladder stones, EXCHANGE OF SP TUBE;  Surgeon: Dillon Moon MD;  Location: [facility];  Service: Urology   • SUPRAPUBIC CATHETER INSERTION         Family History   Problem Relation Age of Onset   • Heart attack Mother    • Stroke Mother    • Heart attack Father    • Anuerysm Father         In Stomach      I have reviewed and agree with the history as documented. E-Cigarette/Vaping   • E-Cigarette Use Never User      E-Cigarette/Vaping Substances   • Nicotine No    • THC No    • CBD No    • Flavoring No    • Other No    • Unknown No      Social History     Tobacco Use   • Smoking status: Former     Packs/day: 0.50     Years: 31.00     Total pack years: 15.50     Types: Cigarettes     Start date:      Quit date:      Years since quittin.6   • Smokeless tobacco: Never   Vaping Use   • Vaping Use: Never used   Substance Use Topics   • Alcohol use: Not Currently   • Drug use: No       Review of Systems   Constitutional: Negative for activity change, chills and fever. HENT: Negative for facial swelling, sore throat and trouble swallowing. Eyes: Negative for pain and visual disturbance. Respiratory: Positive for shortness of breath. Negative for cough and chest tightness. Cardiovascular: Negative for chest pain and leg swelling. Gastrointestinal: Negative for abdominal pain, blood in stool, diarrhea, nausea and vomiting. Genitourinary: Negative for dysuria and flank pain. Musculoskeletal: Negative for back pain, neck pain and neck stiffness. Skin: Negative for pallor and rash. Allergic/Immunologic: Negative for environmental allergies and immunocompromised state. Neurological: Negative for dizziness and headaches. Hematological: Negative for adenopathy.  Does not bruise/bleed easily. Psychiatric/Behavioral: Negative for agitation and behavioral problems. All other systems reviewed and are negative. Physical Exam  Physical Exam  Vitals and nursing note reviewed. Constitutional:       General: He is not in acute distress. Appearance: He is well-developed. HENT:      Head: Normocephalic and atraumatic. Eyes:      Extraocular Movements: Extraocular movements intact. Cardiovascular:      Rate and Rhythm: Regular rhythm. Tachycardia present. Heart sounds: Normal heart sounds. Pulmonary:      Effort: Pulmonary effort is normal.      Breath sounds: Normal breath sounds. Abdominal:      Palpations: Abdomen is soft. Tenderness: There is abdominal tenderness (mild) in the epigastric area. There is no guarding or rebound. Musculoskeletal:         General: Normal range of motion. Cervical back: Normal range of motion and neck supple. Skin:     General: Skin is warm and dry. Neurological:      General: No focal deficit present. Mental Status: He is alert and oriented to person, place, and time.    Psychiatric:         Mood and Affect: Mood normal.         Behavior: Behavior normal.         Vital Signs  ED Triage Vitals [08/18/23 2025]   Temperature Pulse Resp Blood Pressure SpO2   98.4 °F (36.9 °C) (!) 111 -- (!) 185/96 98 %      Temp Source Heart Rate Source Patient Position - Orthostatic VS BP Location FiO2 (%)   Oral Monitor Lying Right arm --      Pain Score       3           Vitals:    08/18/23 2025 08/18/23 2217   BP: (!) 185/96 (!) 181/81   Pulse: (!) 111 100   Patient Position - Orthostatic VS: Lying Lying         Visual Acuity      ED Medications  Medications   acetaminophen (TYLENOL) tablet 650 mg (has no administration in time range)   morphine injection 2 mg (has no administration in time range)   ondansetron (ZOFRAN) injection 4 mg (has no administration in time range)   iohexol (OMNIPAQUE) 350 MG/ML injection (MULTI-DOSE) 100 mL (100 mL Intravenous Given 8/18/23 2204)   cefepime (MAXIPIME) IVPB (premix in dextrose) 2,000 mg 50 mL (0 mg Intravenous Stopped 8/18/23 2347)       Diagnostic Studies  Results Reviewed     Procedure Component Value Units Date/Time    HS Troponin I 2hr [[id number]]  (Normal) Collected: 08/18/23 2300    Lab Status: Final result Specimen: Blood from Arm, Left Updated: 08/18/23 2329     hs TnI 2hr 5 ng/L      Delta 2hr hsTnI 0 ng/L     Lactic acid, plasma (w/reflex if result > 2.0) [[id number]]  (Normal) Collected: 08/18/23 2158    Lab Status: Final result Specimen: Blood from Hand, Right Updated: 08/18/23 2238     LACTIC ACID 1.7 mmol/L     Narrative:      Result may be elevated if tourniquet was used during collection. Urine Microscopic [[id number]]  (Abnormal) Collected: 08/18/23 2216    Lab Status: Final result Specimen: Urine, Suprapubic catheter Updated: 08/18/23 2232     RBC, UA 10-20 /hpf      WBC, UA Innumerable /hpf      Epithelial Cells None Seen /hpf      Bacteria, UA Innumerable /hpf      Granular Casts, UA 5-10     Amorphous Crystals, UA Occasional    Urine culture [[id number]] Collected: 08/18/23 2216    Lab Status: In process Specimen: Urine, Suprapubic catheter Updated: 08/18/23 2232    UA w Reflex to Microscopic w Reflex to Culture [[id number]]  (Abnormal) Collected: 08/18/23 2216    Lab Status: Final result Specimen: Urine, Suprapubic catheter Updated: 08/18/23 2225     Color, UA Light Orange     Clarity, UA Extra Turbid     Specific Gravity, UA 1.027     pH, UA 8.0     Leukocytes, UA Large     Nitrite, UA Negative     Protein, UA Trace mg/dl      Glucose,  (1/5%) mg/dl      Ketones, UA Negative mg/dl      Urobilinogen, UA <2.0 mg/dl      Bilirubin, UA Negative     Occult Blood, UA Trace    Blood culture #2 [[id number]] Collected: 08/18/23 2150    Lab Status:  In process Specimen: Blood from Arm, Right Updated: 08/18/23 2201    Blood culture #1 [[id number]] Collected: 08/18/23 2146    Lab Status: In process Specimen: Blood from Arm, Left Updated: 08/18/23 2201    HS Troponin 0hr (reflex protocol) [[id number]]  (Normal) Collected: 08/18/23 2105    Lab Status: Final result Specimen: Blood from Arm, Left Updated: 08/18/23 2147     hs TnI 0hr 5 ng/L     B-Type Natriuretic Peptide(BNP) [[id number]]  (Normal) Collected: 08/18/23 2105    Lab Status: Final result Specimen: Blood from Arm, Left Updated: 08/18/23 2146     BNP 34 pg/mL     Comprehensive metabolic panel [[id number]]  (Abnormal) Collected: 08/18/23 2105    Lab Status: Final result Specimen: Blood from Arm, Left Updated: 08/18/23 2139     Sodium 130 mmol/L      Potassium 4.1 mmol/L      Chloride 95 mmol/L      CO2 27 mmol/L      ANION GAP 8 mmol/L      BUN 9 mg/dL      Creatinine 0.86 mg/dL      Glucose 190 mg/dL      Calcium 10.1 mg/dL      AST 11 U/L      ALT 12 U/L      Alkaline Phosphatase 105 U/L      Total Protein 8.0 g/dL      Albumin 3.8 g/dL      Total Bilirubin 0.48 mg/dL      eGFR 85 ml/min/1.73sq m     Narrative:      [place] guidelines for Chronic Kidney Disease (CKD):   •  Stage 1 with normal or high GFR (GFR > 90 mL/min/1.73 square meters)  •  Stage 2 Mild CKD (GFR = 60-89 mL/min/1.73 square meters)  •  Stage 3A Moderate CKD (GFR = 45-59 mL/min/1.73 square meters)  •  Stage 3B Moderate CKD (GFR = 30-44 mL/min/1.73 square meters)  •  Stage 4 Severe CKD (GFR = 15-29 mL/min/1.73 square meters)  •  Stage 5 End Stage CKD (GFR <15 mL/min/1.73 square meters)  Note: GFR calculation is accurate only with a steady state creatinine    Protime-INR [[id number]]  (Normal) Collected: 08/18/23 2105    Lab Status: Final result Specimen: Blood from Arm, Left Updated: 08/18/23 2127     Protime 12.8 seconds      INR 0.96    APTT [[id number]]  (Normal) Collected: 08/18/23 2105    Lab Status: Final result Specimen: Blood from Arm, Left Updated: 08/18/23 2127     PTT 28 seconds     CBC and differential [[id number]]  (Abnormal) Collected: 08/18/23 2105    Lab Status: Final result Specimen: Blood from Arm, Left Updated: 08/18/23 2112     WBC 18.76 Thousand/uL      RBC 5.28 Million/uL      Hemoglobin 15.2 g/dL      Hematocrit 45.5 %      MCV 86 fL      MCH 28.8 pg      MCHC 33.4 g/dL      RDW 12.6 %      MPV 7.6 fL      Platelets 235 Thousands/uL      nRBC 0 /100 WBCs      Neutrophils Relative 73 %      Immat GRANS % 1 %      Lymphocytes Relative 15 %      Monocytes Relative 7 %      Eosinophils Relative 3 %      Basophils Relative 1 %      Neutrophils Absolute 13.85 Thousands/µL      Immature Grans Absolute 0.10 Thousand/uL      Lymphocytes Absolute 2.83 Thousands/µL      Monocytes Absolute 1.28 Thousand/µL      Eosinophils Absolute 0.58 Thousand/µL      Basophils Absolute 0.12 Thousands/µL                  PE Study with CT Abdomen and Pelvis with contrast   Final Result by Rosemary Oro MD (08/18 2325)      Bilateral right greater than left pyelonephritis      Cystitis               Workstation performed: [id number]                    Procedures  ECG 12 Lead Documentation Only    Date/Time: 8/18/2023 9:40 PM    Performed by: Cassy Roe MD  Authorized by: Cassy Roe MD    Indications / Diagnosis:  SOB  ECG reviewed by me, the ED Provider: yes    Patient location:  ED  Interpretation:     Interpretation: normal    Rate:     ECG rate:  114    ECG rate assessment: tachycardic    Rhythm:     Rhythm: sinus rhythm    Ectopy:     Ectopy: none    QRS:     QRS axis:  Normal  Conduction:     Conduction: normal    ST segments:     ST segments:  Normal  T waves:     T waves: normal               ED Course  ED Course as of 08/18/23 2347   Fri Aug 18, 2023   2121 WBC(!): 18.76  WBC noted, 2 SIRS Criteria, we will add Blood Cx, lactic. 2139 Sodium(!): 130   2139 Potassium: 4.1   2139 BUN: 9   2139 Creatinine: 0.86   2139 Glucose, Random(!): 190  CMP noted.    2247 LACTIC ACID: 1.7  Lactic acid normal   2248 WBC, UA(!): Innumerable   2248 Bacteria, UA(!): Innumerable  Urine Micro noted for Innumerable WBC and bactareria, will give Abx.   9554 PE Study with CT Abdomen and Pelvis with contrast  IMPRESSION:     Bilateral right greater than left pyelonephritis     Cystitis   2339 Patient informed about workup results, bilateral Pyelo, Abx given, c/o pain, we will give pain meds. Initial Sepsis Screening     Row Name 08/18/23 2346                Is the patient's history suggestive of a new or worsening infection? Yes (Proceed)  -SA        Suspected source of infection urinary tract infection  -SA        Indicate SIRS criteria Tachycardia > 90 bpm;Leukocytosis (WBC > 15793 IJL) OR Leukopenia (WBC <4000 IJL) OR Bandemia (WBC >10% bands)  -SA        Are two or more of the above signs & symptoms of infection both present and new to the patient? Yes (Proceed)  -SA        Assess for evidence of organ dysfunction: Are any of the below criteria present within 6 hours of suspected infection and SIRS criteria that are NOT considered to be chronic conditions? --  None  -SA              User Key  (r) = Recorded By, (t) = Taken By, (c) = Cosigned By    [address] [place] [address] Name Provider Type    SA Taylor Daniel MD Physician                SBIRT 22yo+    Flowsheet Row Most Recent Value   Initial Alcohol Screen: US AUDIT-C     1. How often do you have a drink containing alcohol? 0 Filed at: 08/18/2023 2228   2. How many drinks containing alcohol do you have on a typical day you are drinking? 0 Filed at: 08/18/2023 2228   3a. Male UNDER 65: How often do you have five or more drinks on one occasion? 0 Filed at: 08/18/2023 2228   3b. FEMALE Any Age, or MALE 65+: How often do you have 4 or more drinks on one occassion? 0 Filed at: 08/18/2023 2228   Audit-C Score 0 Filed at: 08/18/2023 2228   ALPESH: How many times in the past year have you. .. Used an illegal drug or used a prescription medication for non-medical reasons?  Never Filed at: 08/18/2023 2228          Wicho Betts Criteria for PE    Flowsheet Row Most Recent Value   Wells' Criteria for PE    Clinical signs and symptoms of DVT 0 Filed at: 08/18/2023 2039   PE is primary diagnosis or equally likely 3 Filed at: 08/18/2023 2039   HR >100 1.5 Filed at: 08/18/2023 2039   Immobilization at least 3 days or Surgery in the previous 4 weeks 0 Filed at: 08/18/2023 2039   Previous, objectively diagnosed PE or DVT 0 Filed at: 08/18/2023 2039   Hemoptysis 0 Filed at: 08/18/2023 2039   Malignancy with treatment within 6 months or palliative 0 Filed at: 08/18/2023 2039   Wells' Criteria Total 4.5 Filed at: 08/18/2023 2039                Medical Decision Making  Patient is a 74-year-old male, history of HTN, diabetes, MS, neurogenic bladder with indwelling suprapubic catheter, hiatal hernia, comes in with complaints of shortness of breath, upper abdominal pain, started about 2 hours back, no fever or cough, no vomiting or diarrhea. Exam, patient is conscious, alert, vital signs noted for elevated blood pressure, tachycardia, afebrile, O2 sats 98% on room air, no acute respiratory distress, lungs are clear, heart sounds with tachycardia, pulses equal bilaterally, abdomen is soft, mild tenderness in epigastrium is noted. Differential diagnosis: Dyspnea, abdominal pain, CHF, PE,  gastritis, pancreatitis, PE, bowel obstruction, we will check labs including CBC, CMP, lipase, EKG, get CT PE study with abdomen pelvis. Abdominal pain: acute illness or injury  Dyspnea: acute illness or injury  Leucocytosis: acute illness or injury  Pyelonephritis: acute illness or injury  SIRS (systemic inflammatory response syndrome) ([address]): acute illness or injury  Tachycardia: acute illness or injury  Amount and/or Complexity of Data Reviewed  Labs: ordered. Decision-making details documented in ED Course. Radiology: ordered. Decision-making details documented in ED Course. ECG/medicine tests: ordered and independent interpretation performed.  Decision-making details documented in ED Course. Risk  OTC drugs. Prescription drug management. Decision regarding hospitalization. Disposition  Final diagnoses:   Dyspnea   Abdominal pain   Tachycardia   Leucocytosis   Pyelonephritis   SIRS (systemic inflammatory response syndrome) ([address])     Time reflects when diagnosis was documented in both MDM as applicable and the Disposition within this note     Time User Action Codes Description Comment    8/18/2023  9:41 PM Wash Parker Add [R06.00] Dyspnea     8/18/2023  9:41 PM Wash Karlo Add [R10.9] Abdominal pain     8/18/2023  9:41 PM Wash Karlo Add [R00.0] Tachycardia     8/18/2023  9:41 PM Wash Parker Add [D72.829] Leucocytosis     8/18/2023 11:43 PM Wash Parker Add [N12] Pyelonephritis     8/18/2023 11:47 PM Wash Karlo Add [R65.10] SIRS (systemic inflammatory response syndrome) [facility])       ED Disposition     ED Disposition   Admit    Condition   Stable    Date/Time   Fri Aug 18, 2023 11:44 PM    Comment   Case was discussed with Maggy Hand and the patient's admission status was agreed to be Admission Status: inpatient status to the service of Giss[facility] White. Follow-up Information    None         Patient's Medications   Discharge Prescriptions    No medications on file       No discharge procedures on file.     PDMP Review       Value Time User    PDMP Reviewed  Yes 6/3/2023  7:35 AM Jade Johnston PA-C          ED Provider  Electronically Signed by           Madhav Dietrich MD  08/18/23 [id number]

## 2023-08-19 NOTE — ASSESSMENT & PLAN NOTE
Lab Results   Component Value Date    HGBA1C 9.2 (H) 06/03/2023     · [facility]  · Sliding scale inpatient. ADA diet    No results for input(s): "POCGLU" in the last 72 hours.     Blood Sugar Average: Last 72 hrs:

## 2023-08-19 NOTE — ASSESSMENT & PLAN NOTE
· Sepsis POA with leukocytosis and tachycardia  · Recent admission for urosepsis June 2023  · CTA A/P showing cystitis and bilateral pyelonephritis R>L. No hydronephrosis  · Hx of neurogenic bladder managed with SPC  · UA orange turbid UO with large leuks and innumerable WBC, no nitrites  · Previous cultures grew E coli and E faecalis susceptible to IV ceftriaxone and ampicillin  · We will treat with IV ceftriaxone 1 g daily, IV ampicillin 1 g every 6 hours  · Follow urine culture and blood cultures  · Consider SPC exchange.   Urology consult  · Consider ID consult

## 2023-08-19 NOTE — CONSULTS
CONSULT    Patient Name: Sav Hebert  Patient MRN: [id number]  Date of Service: 8/19/2023   Date / Time Note Created: 8/19/2023 4:48 PM   Referring Provider: Yael Mcknight DO    Provider Creating Note: SHA Joe    PCP: Nisha Chakraborty  Attending Provider:  Yael Mcknight DO    Reason for Consult:     HPI:  Sav Hebert is a 69-year-old comorbid male with history of CVA, BPH, prior TURP, eventual failure due to MS, managed with chronic suprapubic tube. He is well-known to our service. He presented to [facility] emergency room 1 day ago with a history of abdominal pain, nausea and loss of appetite. However, not accompanied by fever, chills or hemodynamic instability. He has normal renal function but positive leukocytosis between 17 K--18 K. Urine analysis was positive for innumerable WBCs and bacteria, awaiting confirmatory cultures. Patient did not have elevated lactic acid or procalcitonin levels. CT imaging demonstrated bilateral urothelial hyperenhancement greater on the right renal unit than the left consistent with acute inflammation. Upper tracts were well decompressed without evidence of hydronephrosis/obstruction. There is no evidence of perinephric bleeding, abscess, phlegmon or discrete fluid collection, bladder overdistention, bladder hematoma or bladder calculi. Bladder wall was thickened with evidence of perivesical stranding consistent with acute cystitis. He has had prior cystoscopic examination last year with Dr. Dillon Moon negative for malignancy. His suprapubic catheter was exchanged monthly in our office. However, patient is from facility with skilled nursing, it is unclear when suprapubic was last exchanged.            Active Problems:    Patient Active Problem List   Diagnosis   • Confusion   • Cellulitis   • Diabetic neuropathy ([address])   • Cervical spinal stenosis   • 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HCC)   • Benign colon polyp   • Chronic suprapubic catheter [facility])   • Esophageal reflux   • Fatty liver   • Generalized anxiety disorder   • Glaucoma   • Hyperlipidemia   • Primary hypertension   • Multiple sclerosis ([address])   • Obstructive sleep apnea   • Thyroid nodule   • Type 2 diabetes mellitus with hyperglycemia, without long-term current use of insulin ([facility])   • Urinary retention   • History of CVA (cerebrovascular accident)   • Bladder stones   • Bladder neck contracture   • Pancreatic mass   • Pancreatic lesion   • Sepsis ([address])   • Head injury   • Excessive daytime sleepiness   • Shortness of breath   • Acute metabolic encephalopathy   • Hyponatremia   • Chronic diastolic congestive heart failure ([facility])   • Pyelonephritis   • Constipation   • Sepsis due to urinary tract infection ([facility])         Impressions  • History of BPH status post TURP  • Chronic urinary retention--despite TURP and bladder outlet procedure, likely due to neurogenic bladder from CVA and mass. • Chronic suprapubic catheter status--secondary to previous  • Presumed UTI--urine and blood cultures are pending. Urine analysis alone is not a strong clinical indicators of infection in patients with chronic indwelling urinary drains. Recommendations  1. Continue medical optimization, symptom management as needed and empiric antibiosis. 2. Narrow per sensitivities. 3. 20 French suprapubic catheter (2 way) not available in stock. 4. We will request sent perform suprapubic catheter exchange nonurgently (likely tomorrow 8/20). 5. There is no indication for acute/urgent/emergent  surgical intervention.         Past Medical History:   Diagnosis Date   • Acute laryngitis    • Acute nonsuppurative otitis media, unspecified laterality    • Arm weakness    • Arthritis    • 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[facility])    • Bladder infection    • Bronchitis    • Constipation    • Cough    • Diabetes mellitus (HCC)    • Dizziness    • Dysfunction of eustachian tube    • Erectile dysfunction of non-organic origin • Fatigue    • Glaucoma    • Hiatal hernia    • Hypertension    • Imbalance    • Leg muscle spasm    • MS (multiple sclerosis) (HCC)    • Nephrolithiasis    • Neurogenic bladder    • No natural teeth    • Sinus pain    • Spinal stenosis    • Strain of thoracic region    • Stroke [facility])    • Suprapubic catheter (HCC)        Past Surgical History:   Procedure Laterality Date   • APPENDECTOMY     • BRAIN SURGERY      Coil placed in aneurysm   • CYSTOSCOPY     • CYSTOSCOPY     • CYSTOSCOPY  2018   • CYSTOSCOPY  01/15/2021   • EYE SURGERY      transscleral cyclophotocoagulation noncontact YAG laser   • MYRINGOTOMY      with ventilation tube insertion   • TX LITHOLAPAXY SMPL/SM <2.5 CM N/A 2019    Procedure: CYSTOSCOPY, holmium laser litholapaxy of bladder stones, EXCHANGE OF SP TUBE;  Surgeon: Leatha Pantoja MD;  Location: [facility];  Service: Urology   • SUPRAPUBIC CATHETER INSERTION         Family History   Problem Relation Age of Onset   • Heart attack Mother    • Stroke Mother    • Heart attack Father    • Anuerysm Father         In Stomach        Social History     Socioeconomic History   • Marital status: Single     Spouse name: Not on file   • Number of children: Not on file   • Years of education: Not on file   • Highest education level: Not on file   Occupational History   • Occupation:    retired   Tobacco Use   • Smoking status: Former     Packs/day: 0.50     Years: 31.00     Total pack years: 15.50     Types: Cigarettes     Start date:      Quit date:      Years since quittin.6   • Smokeless tobacco: Never   Vaping Use   • Vaping Use: Never used   Substance and Sexual Activity   • Alcohol use: Not Currently   • Drug use: No   • Sexual activity: Never   Other Topics Concern   • Not on file   Social History Narrative   • Not on file     Social Determinants of Health     Financial Resource Strain: Not on file   Food Insecurity: No Food Insecurity (2023)    Hunger Vital Sign    • Worried About Running Out of Food in the Last Year: Never true    • Ran Out of Food in the Last Year: Never true   Transportation Needs: No Transportation Needs (6/26/2023)    PRAPARE - Transportation    • Lack of Transportation (Medical): No    • Lack of Transportation (Non-Medical):  No   Physical Activity: Not on file   Stress: Not on file   Social Connections: Not on file   Intimate Partner Violence: Not on file   Housing Stability: High Risk (6/26/2023)    Housing Stability Vital Sign    • Unable to Pay for Housing in the Last Year: Yes    • Number of Places Lived in the Last Year: 1    • Unstable Housing in the Last Year: No       Allergies   Allergen Reactions   • Cephalexin Rash       Review of Systems  Review of Systems - History obtained from chart review and the patient  General ROS: negative for - chills or fever  Respiratory ROS: no cough, shortness of breath, or wheezing  Cardiovascular ROS: no chest pain or dyspnea on exertion  Gastrointestinal ROS: positive for - abdominal pain and appetite loss  Genito-Urinary ROS: no dysuria, trouble voiding, or hematuria  Neurological ROS: positive for - weakness       Chart Review   Allergies, current medications, history, problem list    Vital Signs  /56 (BP Location: Right arm)   Pulse 71   Temp (!) 97.2 °F (36.2 °C) (Temporal)   Resp 20   Ht 5' 4" (1.626 m)   Wt 72.3 kg (159 lb 6.3 oz)   SpO2 99%   BMI 27.36 kg/m²     Physical Exam  General appearance: alert and oriented, in no acute distress, appears stated age, cooperative and no distress  Head: Normocephalic, without obvious abnormality, atraumatic  Neck: no JVD and supple, symmetrical, trachea midline  Lungs: diminished breath sounds  Heart: regular rate and rhythm, S1, S2 normal, no murmur, click, rub or gallop  Abdomen: soft, non-tender; bowel sounds normal; no masses,  no organomegaly  Extremities: extremities normal, warm and well-perfused; no cyanosis, clubbing, or edema and Generalized weakness.   Pulses: 2+ and symmetric  Neurologic: Grossly normal  20 [place] SP tube--mariel urine--cloudy     Laboratory Studies  Lab Results   Component Value Date    HGBA1C 9.2 (H) 06/03/2023    HGBA1C 7.8 (H) 06/22/2016     06/22/2016    K 4.1 08/19/2023    K 3.9 06/22/2016    CL 98 08/19/2023    CL 97 (L) 06/22/2016    CO2 27 08/19/2023    CO2 27 10/21/2018    GLUCOSE 190 (H) 10/21/2018    GLUCOSE 123 08/28/2015    CREATININE 0.91 08/19/2023    CREATININE 0.85 06/22/2016    BUN 7 08/19/2023    BUN 14 06/22/2016    MG 1.7 08/07/2020    MG 1.7 08/28/2015     Lab Results   Component Value Date    WBC 17.05 (H) 08/19/2023    WBC 10.8 08/10/2016    RBC 4.44 08/19/2023    RBC 5.56 08/10/2016    HGB 12.8 08/19/2023    HGB 15.7 08/10/2016    HCT 38.5 08/19/2023    HCT 48.7 08/10/2016    MCV 87 08/19/2023    MCV 87.6 08/10/2016    MCH 28.8 08/19/2023    MCH 28.2 08/10/2016    RDW 12.5 08/19/2023    RDW 14.7 08/10/2016     (H) 08/19/2023     08/10/2016       Imaging and Other Studies        Medications   Current Facility-Administered Medications   Medication Dose Route Frequency Provider Last Rate   • acetaminophen  975 mg Oral Q6H PRN Domi Quill, CRNP     • albuterol  2 puff Inhalation Q6H PRN Domi Quill, CRNP     • ALPRAZolam  0.25 mg Oral BID PRN Domi Quill, CRNP     • aluminum-magnesium hydroxide-simethicone  30 mL Oral Q6H PRN Domi Quill, CRNP     • amLODIPine  10 mg Oral Daily Domi Quill, CRNP      And   • atorvastatin  80 mg Oral Daily Domi Quill, CRNP     • ampicillin  1,000 mg Intravenous Q6H Domi Quill, CRNP 1,000 mg (08/19/23 1532)   • cefTRIAXone  1,000 mg Intravenous Q24H SHA Lima 1,000 mg (08/19/23 1156)   • clopidogrel  75 mg Oral Daily SHA Lima     • diphenhydrAMINE  25 mg Oral Q6H PRN SHA Lima     • enoxaparin  40 mg Subcutaneous Daily SHA Lima     • gabapentin  300 mg Oral BID SHA Chen     • gabapentin  600 mg Oral HS SHA Chen     • insulin lispro  1-6 Units Subcutaneous 4x Daily (AC & HS) SHA Chen     • latanoprost  1 drop Both Eyes HS SHA Chen     • melatonin  6 mg Oral HS SHA Chen     • ondansetron  4 mg Intravenous Q6H PRN SHA Chen     • oxyCODONE  5 mg Oral 4x Daily PRN SHA Chen     • oxyCODONE  2.5 mg Oral Q6H PRN SHA Chen     • pantoprazole  40 mg Oral Early Morning SHA Chen     • polyethylene glycol  17 g Oral Daily PRN SHA Chen     • propranolol  60 mg Oral Daily SHA Chen     • senna-docusate sodium  2 tablet Oral HS SHA Chen     • sertraline  25 mg Oral HS SHA Chen     • simethicone  80 mg Oral 4x Daily PRN Baron Lawrence, Seng W Ansley Juárez,[address], CRNP

## 2023-08-19 NOTE — PLAN OF CARE
Problem: Potential for Falls  Goal: Patient will remain free of falls  Description: INTERVENTIONS:  - Educate patient/family on patient safety including physical limitations  - Instruct patient to call for assistance with activity   - Consult OT/PT to assist with strengthening/mobility   - Keep Call bell within reach  - Keep bed low and locked with side rails adjusted as appropriate  - Keep care items and personal belongings within reach  - Initiate and maintain comfort rounds  - Make Fall Risk Sign visible to staff  - Offer Toileting every 2 Hours, in advance of need  - Initiate/Maintain BED alarm  - Obtain necessary fall risk management equipment:   - Apply yellow socks and bracelet for high fall risk patients  - Consider moving patient to room near nurses station  Outcome: Progressing     Problem: MOBILITY - ADULT  Goal: Maintain or return to baseline ADL function  Description: INTERVENTIONS:  -  Assess patient's ability to carry out ADLs; assess patient's baseline for ADL function and identify physical deficits which impact ability to perform ADLs (bathing, care of mouth/teeth, toileting, grooming, dressing, etc.)  - Assess/evaluate cause of self-care deficits   - Assess range of motion  - Assess patient's mobility; develop plan if impaired  - Assess patient's need for assistive devices and provide as appropriate  - Encourage maximum independence but intervene and supervise when necessary  - Involve family in performance of ADLs  - Assess for home care needs following discharge   - Consider OT consult to assist with ADL evaluation and planning for discharge  - Provide patient education as appropriate  Outcome: Progressing  Goal: Maintains/Returns to pre admission functional level  Description: INTERVENTIONS:  - Perform BMAT or MOVE assessment daily.   - Set and communicate daily mobility goal to care team and patient/family/caregiver.    - Collaborate with rehabilitation services on mobility goals if consulted  - Perform Range of Motion 3 times a day. - Reposition patient every 3 hours.   - Dangle patient 3 times a day  - Stand patient 3 times a day  - Ambulate patient 3 times a day  - Out of bed to chair 3 times a day   - Out of bed for meals 3 times a day  - Out of bed for toileting  - Record patient progress and toleration of activity level   Outcome: Progressing     Problem: PAIN - ADULT  Goal: Verbalizes/displays adequate comfort level or baseline comfort level  Description: Interventions:  - Encourage patient to monitor pain and request assistance  - Assess pain using appropriate pain scale  - Administer analgesics based on type and severity of pain and evaluate response  - Implement non-pharmacological measures as appropriate and evaluate response  - Consider cultural and social influences on pain and pain management  - Notify physician/advanced practitioner if interventions unsuccessful or patient reports new pain  Outcome: Progressing     Problem: INFECTION - ADULT  Goal: Absence or prevention of progression during hospitalization  Description: INTERVENTIONS:  - Assess and monitor for signs and symptoms of infection  - Monitor lab/diagnostic results  - Monitor all insertion sites, i.e. indwelling lines, tubes, and drains  - Monitor endotracheal if appropriate and nasal secretions for changes in amount and color  - [place] appropriate cooling/warming therapies per order  - Administer medications as ordered  - Instruct and encourage patient and family to use good hand hygiene technique  - Identify and instruct in appropriate isolation precautions for identified infection/condition  Outcome: Progressing  Goal: Absence of fever/infection during neutropenic period  Description: INTERVENTIONS:  - Monitor WBC    Outcome: Progressing     Problem: SAFETY ADULT  Goal: Patient will remain free of falls  Description: INTERVENTIONS:  - Educate patient/family on patient safety including physical limitations  - Instruct patient to call for assistance with activity   - Consult OT/PT to assist with strengthening/mobility   - Keep Call bell within reach  - Keep bed low and locked with side rails adjusted as appropriate  - Keep care items and personal belongings within reach  - Initiate and maintain comfort rounds  - Make Fall Risk Sign visible to staff  - Offer Toileting every 2 Hours, in advance of need  - Initiate/Maintain bed alarm  - Obtain necessary fall risk management equipment:   - Apply yellow socks and bracelet for high fall risk patients  - Consider moving patient to room near nurses station  Outcome: Progressing  Goal: Maintain or return to baseline ADL function  Description: INTERVENTIONS:  -  Assess patient's ability to carry out ADLs; assess patient's baseline for ADL function and identify physical deficits which impact ability to perform ADLs (bathing, care of mouth/teeth, toileting, grooming, dressing, etc.)  - Assess/evaluate cause of self-care deficits   - Assess range of motion  - Assess patient's mobility; develop plan if impaired  - Assess patient's need for assistive devices and provide as appropriate  - Encourage maximum independence but intervene and supervise when necessary  - Involve family in performance of ADLs  - Assess for home care needs following discharge   - Consider OT consult to assist with ADL evaluation and planning for discharge  - Provide patient education as appropriate  Outcome: Progressing  Goal: Maintains/Returns to pre admission functional level  Description: INTERVENTIONS:  - Perform BMAT or MOVE assessment daily.   - Set and communicate daily mobility goal to care team and patient/family/caregiver. - Collaborate with rehabilitation services on mobility goals if consulted  - Perform Range of Motion 3 times a day. - Reposition patient every 3 hours.   - Dangle patient 3 times a day  - Stand patient 3 times a day  - Ambulate patient 3 times a day  - Out of bed to chair 3 times a day   - Out of bed for meals 3 times a day  - Out of bed for toileting  - Record patient progress and toleration of activity level   Outcome: Progressing     Problem: Knowledge Deficit  Goal: Patient/family/caregiver demonstrates understanding of disease process, treatment plan, medications, and discharge instructions  Description: Complete learning assessment and assess knowledge base.   Interventions:  - Provide teaching at level of understanding  - Provide teaching via preferred learning methods  Outcome: Progressing     Problem: Prexisting or High Potential for Compromised Skin Integrity  Goal: Skin integrity is maintained or improved  Description: INTERVENTIONS:  - Identify patients at risk for skin breakdown  - Assess and monitor skin integrity  - Assess and monitor nutrition and hydration status  - Monitor labs   - Assess for incontinence   - Turn and reposition patient  - Assist with mobility/ambulation  - Relieve pressure over bony prominences  - Avoid friction and shearing  - Provide appropriate hygiene as needed including keeping skin clean and dry  - Evaluate need for skin moisturizer/barrier cream  - Collaborate with interdisciplinary team   - Patient/family teaching  - Consider wound care consult   Outcome: Progressing

## 2023-08-19 NOTE — ASSESSMENT & PLAN NOTE
[address] [place] [address] Dr  [facility] EMERGENCY DEPT  [phone]  [phone]    Work/School Note    Date: 8/4/2021    To Whom It May concern:    Abram Diggs was seen and treated today in the emergency room by the following provider(s):  Attending Provider: Nathalia Curran DO  Nurse Practitioner: Humberto Jimenes NP. Abram Diggs is excused from work/school on 08/04/21 and 08/05/21. She is medically clear to return to work/school on 8/6/2021.        Sincerely,          Chantal Chino NP · See plan sepsis due to UTI

## 2023-08-19 NOTE — PROGRESS NOTES
[facility]  Progress Note  Name: Brodie Burgos  MRN: [id number]  Unit/Bed#: E4 -01 I Date of Admission: 8/18/2023   Date of Service: 8/19/2023 I Hospital Day: 1    Assessment/Plan   * Sepsis due to urinary tract infection [facility])  Assessment & Plan  · Sepsis POA with leukocytosis and tachycardia  · Recent admission for urosepsis June 2023  · CTA A/P showing cystitis and bilateral pyelonephritis R>L.   No hydronephrosis  · Hx of neurogenic bladder managed with SPC  · Continue IV Rocephin and ampicillin as previously recommended by infectious disease  · Follow-up blood cultures urine cultures  · Trend Fever/WBC curve    Pyelonephritis  Assessment & Plan  · See plan sepsis due to UTI    Hyponatremia  Assessment & Plan  · Mild hyponatremia  · No improvement with IV fluid hydration  · We will check fluid studies  · Possibly SIADH in setting of SSRI    History of CVA (cerebrovascular accident)  Assessment & Plan  · History of CVA in 2018  · On Plavix and statin    Type 2 diabetes mellitus with hyperglycemia, without long-term current use of insulin (HCC)  Assessment & Plan  Adequate control inpatient  Continue sliding scale    Multiple sclerosis (HCC)  Assessment & Plan  · Longstanding history of MS since 1960's, not maintained on DMD.  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 Time Spent for Care: I have spent a total time of 40 minutes on 23 in caring for this patient including Diagnostic results, Risks and benefits of tx options, Patient and family education, Importance of tx compliance, Counseling / Coordination of care, Documenting in the medical record, Obtaining or reviewing history   and Communicating with other healthcare professionals . Current Length of Stay: 1 day(s)    Current Patient Status: Inpatient   Certification Statement: The patient will continue to require additional inpatient hospital stay due to need for IV antibiotics and management of sepsis    Discharge Plan: Hopeful discharge in next 48 hours pending culture results    Code Status: Level 1 - Full Code      Subjective:   Patient seen and evaluated at bedside. He feels much improved today. Objective:     Vitals:   Temp (24hrs), Av.8 °F (36.6 °C), Min:97.5 °F (36.4 °C), Max:98.4 °F (36.9 °C)    Temp:  [97.5 °F (36.4 °C)-98.4 °F (36.9 °C)] 97.5 °F (36.4 °C)  HR:  [] 78  Resp:  [20] 20  BP: (142-185)/(63-96) 142/63  SpO2:  [97 %-99 %] 97 %  Body mass index is 27.36 kg/m². Input and Output Summary (last 24 hours): Intake/Output Summary (Last 24 hours) at 2023 0808  Last data filed at 2023 2347  Gross per 24 hour   Intake 50 ml   Output 1000 ml   Net -950 ml       Physical Exam:     Physical Exam  Vitals reviewed. Constitutional:       General: He is not in acute distress. Appearance: He is well-developed. He is not ill-appearing, toxic-appearing or diaphoretic. HENT:      Head: Normocephalic and atraumatic. Mouth/Throat:      Mouth: Mucous membranes are moist.   Eyes:      General: No scleral icterus. Extraocular Movements: Extraocular movements intact. Cardiovascular:      Rate and Rhythm: Normal rate and regular rhythm. Heart sounds: Normal heart sounds. Pulmonary:      Effort: Pulmonary effort is normal. No respiratory distress. Breath sounds: Normal breath sounds. No wheezing or rales. Abdominal:      General: There is no distension. Palpations: Abdomen is soft. Tenderness: There is no abdominal tenderness. There is no guarding or rebound. Comments: Suprapubic catheter in place   Musculoskeletal:         General: No swelling, tenderness or deformity. Cervical back: Normal range of motion. Comments: Trace lower extremity edema bilaterally   Skin:     General: Skin is warm and dry. Neurological:      General: No focal deficit present. Mental Status: He is alert. Mental status is at baseline. Psychiatric:         Mood and Affect: Mood normal.         Behavior: Behavior normal.         Thought Content: Thought content normal.         Judgment: Judgment normal.         Additional Data:     Labs:  I have reviewed pertinent results     Results from last 7 days   Lab Units 08/19/23  0534   WBC Thousand/uL 17.05*   HEMOGLOBIN g/dL 12.8   HEMATOCRIT % 38.5   PLATELETS Thousands/uL 396*   NEUTROS PCT % 63   LYMPHS PCT % 24   MONOS PCT % 8   EOS PCT % 3     Results from last 7 days   Lab Units 08/19/23  0534 08/18/23  2105   SODIUM mmol/L 130* 130*   POTASSIUM mmol/L 4.1 4.1   CHLORIDE mmol/L 98 95*   CO2 mmol/L 27 27   BUN mg/dL 7 9   CREATININE mg/dL 0.91 0.86   ANION GAP mmol/L 5 8   CALCIUM mg/dL 9.3 10.1   ALBUMIN g/dL  --  3.8   TOTAL BILIRUBIN mg/dL  --  0.48   ALK PHOS U/L  --  105*   ALT U/L  --  12   AST U/L  --  11*   GLUCOSE RANDOM mg/dL 193* 190*     Results from last 7 days   Lab Units 08/18/23  2105   INR  0.96     Results from last 7 days   Lab Units 08/19/23  0738   POC GLUCOSE mg/dl 185*         Results from last 7 days   Lab Units 08/18/23  2158 08/18/23  2105   LACTIC ACID mmol/L 1.7  --    PROCALCITONIN ng/ml  --  0.08         Imaging: I have reviewed pertinent imaging Recent Cultures (last 7 days):           Last 24 Hours Medication List:   Current Facility-Administered Medications   Medication Dose Route Frequency Provider Last Rate   • acetaminophen  975 mg Oral Q6H PRN [place] Petite, CRNP     • albuterol  2 puff Inhalation Q6H PRN [place] Petite, CRNP     • ALPRAZolam  0.25 mg Oral BID PRN [place] Petite, CRNP     • aluminum-magnesium hydroxide-simethicone  30 mL Oral Q6H PRN [place] Petite, CRNP     • amLODIPine  10 mg Oral Daily Selene Petite, CRNP      And   • atorvastatin  80 mg Oral Daily Selene Petite, CRNP     • ampicillin  1,000 mg Intravenous Q6H [place] Petite, CRNP 1,000 mg (08/19/23 0200)   • cefTRIAXone  1,000 mg Intravenous Q24H [place] Petite, CRNP     • clopidogrel  75 mg Oral Daily [place] Petite, CRNP     • diphenhydrAMINE  25 mg Oral Q6H PRN [place] Petite, CRNP     • enoxaparin  40 mg Subcutaneous Daily [place] Petite, CRNP     • gabapentin  300 mg Oral BID [place] Petite, CRNP     • gabapentin  600 mg Oral HS [place] Petite, CRNP     • insulin lispro  1-6 Units Subcutaneous 4x Daily (AC & HS) [place] Petite, CRNP     • latanoprost  1 drop Both Eyes HS [place] Petite, CRNP     • melatonin  6 mg Oral HS [place] Petite, CRNP     • ondansetron  4 mg Intravenous Q6H PRN [place] Petite, CRNP     • oxyCODONE  5 mg Oral 4x Daily PRN [place] Petite, CRNP     • oxyCODONE  2.5 mg Oral Q6H PRN [place] Petite, CRNP     • pantoprazole  40 mg Oral Early Morning [place] Petite, CRNP     • polyethylene glycol  17 g Oral Daily PRN [place] Petite, CRNP     • propranolol  60 mg Oral Daily [place] Petite, CRNP     • senna-docusate sodium  2 tablet Oral HS [place] Petite, CRNP     • sertraline  25 mg Oral HS [place] Petite, CRNP     • simethicone  80 mg Oral 4x Daily PRN [place] Petite, CRNP          Today, Patient Was Seen By: Rosemarie Finnegan DO    ** Please Note: Dictation voice to text software may have been used in the creation of this document.  **

## 2023-08-19 NOTE — PLAN OF CARE
Problem: INFECTION - ADULT  Goal: Absence or prevention of progression during hospitalization  Description: INTERVENTIONS:  - Assess and monitor for signs and symptoms of infection  - Monitor lab/diagnostic results  - Monitor all insertion sites, i.e. indwelling lines, tubes, and drains  - Monitor endotracheal if appropriate and nasal secretions for changes in amount and color  - [place] appropriate cooling/warming therapies per order  - Administer medications as ordered  - Instruct and encourage patient and family to use good hand hygiene technique  - Identify and instruct in appropriate isolation precautions for identified infection/condition  8/19/2023 1102 by Isiah Chowdhury RN  Outcome: Progressing  8/19/2023 1059 by Isiah Chowdhury RN  Outcome: Progressing  Goal: Absence of fever/infection during neutropenic period  Description: INTERVENTIONS:  - Monitor WBC    8/19/2023 1102 by Isiah Chowdhury RN  Outcome: Progressing  8/19/2023 1059 by Isiah Chowdhury RN  Outcome: Progressing     Problem: SAFETY ADULT  Goal: Patient will remain free of falls  Description: INTERVENTIONS:  - Educate patient/family on patient safety including physical limitations  - Instruct patient to call for assistance with activity   - Consult OT/PT to assist with strengthening/mobility   - Keep Call bell within reach  - Keep bed low and locked with side rails adjusted as appropriate  - Keep care items and personal belongings within reach  - Initiate and maintain comfort rounds  - Make Fall Risk Sign visible to staff  - Offer Toileting every 2 Hours, in advance of need  - Initiate/Maintain bed alarm  - Obtain necessary fall risk management equipment:   - Apply yellow socks and bracelet for high fall risk patients  - Consider moving patient to room near nurses station  8/19/2023 1102 by Isiah Chowdhury RN  Outcome: Progressing  8/19/2023 1059 by Isiah Chowdhury RN  Outcome: Progressing  Goal: Maintain or return to baseline ADL function  Description: INTERVENTIONS:  -  Assess patient's ability to carry out ADLs; assess patient's baseline for ADL function and identify physical deficits which impact ability to perform ADLs (bathing, care of mouth/teeth, toileting, grooming, dressing, etc.)  - Assess/evaluate cause of self-care deficits   - Assess range of motion  - Assess patient's mobility; develop plan if impaired  - Assess patient's need for assistive devices and provide as appropriate  - Encourage maximum independence but intervene and supervise when necessary  - Involve family in performance of ADLs  - Assess for home care needs following discharge   - Consider OT consult to assist with ADL evaluation and planning for discharge  - Provide patient education as appropriate  8/19/2023 1102 by Radha Hoover RN  Outcome: Progressing  8/19/2023 1059 by Radha Hoover RN  Outcome: Progressing  Goal: Maintains/Returns to pre admission functional level  Description: INTERVENTIONS:  - Perform BMAT or MOVE assessment daily.   - Set and communicate daily mobility goal to care team and patient/family/caregiver. - Collaborate with rehabilitation services on mobility goals if consulted  - Perform Range of Motion 3 times a day. - Reposition patient every 3 hours. - Dangle patient 3 times a day  - Stand patient 3 times a day  - Ambulate patient 3 times a day  - Out of bed to chair 3 times a day   - Out of bed for meals 3 times a day  - Out of bed for toileting  - Record patient progress and toleration of activity level   8/19/2023 1102 by Radha Hoover RN  Outcome: Progressing  8/19/2023 1059 by Radha Hoover RN  Outcome: Progressing     Problem: Knowledge Deficit  Goal: Patient/family/caregiver demonstrates understanding of disease process, treatment plan, medications, and discharge instructions  Description: Complete learning assessment and assess knowledge base.   Interventions:  - Provide teaching at level of understanding  - Provide teaching via preferred learning methods  8/19/2023 1102 by Yadira Bhatti RN  Outcome: Progressing  8/19/2023 1059 by Yadira Bhatti RN  Outcome: Progressing     Problem: Prexisting or High Potential for Compromised Skin Integrity  Goal: Skin integrity is maintained or improved  Description: INTERVENTIONS:  - Identify patients at risk for skin breakdown  - Assess and monitor skin integrity  - Assess and monitor nutrition and hydration status  - Monitor labs   - Assess for incontinence   - Turn and reposition patient  - Assist with mobility/ambulation  - Relieve pressure over bony prominences  - Avoid friction and shearing  - Provide appropriate hygiene as needed including keeping skin clean and dry  - Evaluate need for skin moisturizer/barrier cream  - Collaborate with interdisciplinary team   - Patient/family teaching  - Consider wound care consult   8/19/2023 1102 by Yadira Bhatti RN  Outcome: Progressing  8/19/2023 1059 by Yadira Bhatti RN  Outcome: Progressing

## 2023-08-19 NOTE — ASSESSMENT & PLAN NOTE
· S/P stent assisted coil embolization of a basilar artery apex aneurysm in 2015   · Outpatient follow-up

## 2023-08-20 LAB
ANION GAP SERPL CALCULATED.3IONS-SCNC: 7 MMOL/L
BASOPHILS # BLD AUTO: 0.13 THOUSANDS/ÂΜL (ref 0–0.1)
BASOPHILS NFR BLD AUTO: 1 % (ref 0–1)
BUN SERPL-MCNC: 10 MG/DL (ref 5–25)
CALCIUM SERPL-MCNC: 9.1 MG/DL (ref 8.4–10.2)
CHLORIDE SERPL-SCNC: 99 MMOL/L (ref 96–108)
CO2 SERPL-SCNC: 25 MMOL/L (ref 21–32)
CREAT SERPL-MCNC: 0.95 MG/DL (ref 0.6–1.3)
EOSINOPHIL # BLD AUTO: 0.5 THOUSAND/ÂΜL (ref 0–0.61)
EOSINOPHIL NFR BLD AUTO: 3 % (ref 0–6)
ERYTHROCYTE [DISTWIDTH] IN BLOOD BY AUTOMATED COUNT: 12.5 % (ref 11.6–15.1)
GFR SERPL CREATININE-BSD FRML MDRD: 78 ML/MIN/1.73SQ M
GLUCOSE SERPL-MCNC: 196 MG/DL (ref 65–140)
GLUCOSE SERPL-MCNC: 196 MG/DL (ref 65–140)
GLUCOSE SERPL-MCNC: 219 MG/DL (ref 65–140)
GLUCOSE SERPL-MCNC: 277 MG/DL (ref 65–140)
GLUCOSE SERPL-MCNC: 300 MG/DL (ref 65–140)
HCT VFR BLD AUTO: 38.2 % (ref 36.5–49.3)
HGB BLD-MCNC: 12.8 G/DL (ref 12–17)
IMM GRANULOCYTES # BLD AUTO: 0.13 THOUSAND/UL (ref 0–0.2)
IMM GRANULOCYTES NFR BLD AUTO: 1 % (ref 0–2)
LYMPHOCYTES # BLD AUTO: 3.8 THOUSANDS/ÂΜL (ref 0.6–4.47)
LYMPHOCYTES NFR BLD AUTO: 25 % (ref 14–44)
MCH RBC QN AUTO: 29.2 PG (ref 26.8–34.3)
MCHC RBC AUTO-ENTMCNC: 33.5 G/DL (ref 31.4–37.4)
MCV RBC AUTO: 87 FL (ref 82–98)
MONOCYTES # BLD AUTO: 1.26 THOUSAND/ÂΜL (ref 0.17–1.22)
MONOCYTES NFR BLD AUTO: 8 % (ref 4–12)
NEUTROPHILS # BLD AUTO: 9.12 THOUSANDS/ÂΜL (ref 1.85–7.62)
NEUTS SEG NFR BLD AUTO: 62 % (ref 43–75)
NRBC BLD AUTO-RTO: 0 /100 WBCS
PLATELET # BLD AUTO: 399 THOUSANDS/UL (ref 149–390)
PMV BLD AUTO: 8.1 FL (ref 8.9–12.7)
POTASSIUM SERPL-SCNC: 3.6 MMOL/L (ref 3.5–5.3)
PROCALCITONIN SERPL-MCNC: 0.09 NG/ML
RBC # BLD AUTO: 4.39 MILLION/UL (ref 3.88–5.62)
SODIUM SERPL-SCNC: 131 MMOL/L (ref 135–147)
WBC # BLD AUTO: 14.94 THOUSAND/UL (ref 4.31–10.16)

## 2023-08-20 PROCEDURE — 84145 PROCALCITONIN (PCT): CPT | Performed by: NURSE PRACTITIONER

## 2023-08-20 PROCEDURE — 99232 SBSQ HOSP IP/OBS MODERATE 35: CPT | Performed by: INTERNAL MEDICINE

## 2023-08-20 PROCEDURE — 97166 OT EVAL MOD COMPLEX 45 MIN: CPT

## 2023-08-20 PROCEDURE — 82948 REAGENT STRIP/BLOOD GLUCOSE: CPT

## 2023-08-20 PROCEDURE — 80048 BASIC METABOLIC PNL TOTAL CA: CPT | Performed by: INTERNAL MEDICINE

## 2023-08-20 PROCEDURE — 97163 PT EVAL HIGH COMPLEX 45 MIN: CPT

## 2023-08-20 PROCEDURE — 85025 COMPLETE CBC W/AUTO DIFF WBC: CPT | Performed by: INTERNAL MEDICINE

## 2023-08-20 PROCEDURE — 51705 CHANGE OF BLADDER TUBE: CPT | Performed by: UROLOGY

## 2023-08-20 RX ORDER — OXYBUTYNIN CHLORIDE 5 MG/1
5 TABLET, EXTENDED RELEASE ORAL DAILY
Status: DISCONTINUED | OUTPATIENT
Start: 2023-08-20 | End: 2023-08-22 | Stop reason: HOSPADM

## 2023-08-20 RX ADMIN — CLOPIDOGREL BISULFATE 75 MG: 75 TABLET ORAL at 09:14

## 2023-08-20 RX ADMIN — INSULIN LISPRO 3 UNITS: 100 INJECTION, SOLUTION INTRAVENOUS; SUBCUTANEOUS at 17:22

## 2023-08-20 RX ADMIN — GABAPENTIN 600 MG: 300 CAPSULE ORAL at 20:49

## 2023-08-20 RX ADMIN — AMPICILLIN SODIUM 1000 MG: 1 INJECTION, POWDER, FOR SOLUTION INTRAMUSCULAR; INTRAVENOUS at 09:14

## 2023-08-20 RX ADMIN — AMPICILLIN SODIUM 1000 MG: 1 INJECTION, POWDER, FOR SOLUTION INTRAMUSCULAR; INTRAVENOUS at 20:48

## 2023-08-20 RX ADMIN — CEFTRIAXONE 1000 MG: 1 INJECTION, SOLUTION INTRAVENOUS at 10:35

## 2023-08-20 RX ADMIN — AMLODIPINE BESYLATE 10 MG: 10 TABLET ORAL at 09:14

## 2023-08-20 RX ADMIN — PANTOPRAZOLE SODIUM 40 MG: 40 TABLET, DELAYED RELEASE ORAL at 05:50

## 2023-08-20 RX ADMIN — SERTRALINE HYDROCHLORIDE 25 MG: 25 TABLET ORAL at 20:49

## 2023-08-20 RX ADMIN — LATANOPROST 1 DROP: 50 SOLUTION OPHTHALMIC at 20:49

## 2023-08-20 RX ADMIN — ENOXAPARIN SODIUM 40 MG: 100 INJECTION SUBCUTANEOUS at 09:14

## 2023-08-20 RX ADMIN — OXYBUTYNIN CHLORIDE 5 MG: 5 TABLET, EXTENDED RELEASE ORAL at 10:35

## 2023-08-20 RX ADMIN — INSULIN LISPRO 4 UNITS: 100 INJECTION, SOLUTION INTRAVENOUS; SUBCUTANEOUS at 12:20

## 2023-08-20 RX ADMIN — INSULIN LISPRO 2 UNITS: 100 INJECTION, SOLUTION INTRAVENOUS; SUBCUTANEOUS at 20:48

## 2023-08-20 RX ADMIN — GABAPENTIN 300 MG: 300 CAPSULE ORAL at 09:14

## 2023-08-20 RX ADMIN — ATORVASTATIN CALCIUM 80 MG: 80 TABLET, FILM COATED ORAL at 09:14

## 2023-08-20 RX ADMIN — PROPRANOLOL HYDROCHLORIDE 60 MG: 60 CAPSULE, EXTENDED RELEASE ORAL at 09:14

## 2023-08-20 RX ADMIN — AMPICILLIN SODIUM 1000 MG: 1 INJECTION, POWDER, FOR SOLUTION INTRAMUSCULAR; INTRAVENOUS at 14:36

## 2023-08-20 RX ADMIN — GABAPENTIN 300 MG: 300 CAPSULE ORAL at 17:21

## 2023-08-20 RX ADMIN — Medication 6 MG: at 20:49

## 2023-08-20 RX ADMIN — ONDANSETRON 4 MG: 2 INJECTION INTRAMUSCULAR; INTRAVENOUS at 19:34

## 2023-08-20 RX ADMIN — SENNOSIDES AND DOCUSATE SODIUM 2 TABLET: 50; 8.6 TABLET ORAL at 20:49

## 2023-08-20 RX ADMIN — AMPICILLIN SODIUM 1000 MG: 1 INJECTION, POWDER, FOR SOLUTION INTRAMUSCULAR; INTRAVENOUS at 01:20

## 2023-08-20 RX ADMIN — INSULIN LISPRO 2 UNITS: 100 INJECTION, SOLUTION INTRAVENOUS; SUBCUTANEOUS at 09:14

## 2023-08-20 NOTE — OCCUPATIONAL THERAPY NOTE
Occupational Therapy Evaluation     Patient Name: Reina Cisneros  YXVGC'B Date: 8/20/2023  Problem List  Principal Problem:    Sepsis due to urinary tract infection [facility])  Active Problems:    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HCC)    Chronic suprapubic catheter ([facility])    Generalized anxiety disorder    Hyperlipidemia    Primary hypertension    Multiple sclerosis ([address])    Type 2 diabetes mellitus with hyperglycemia, without long-term current use of insulin ([facility])    History of CVA (cerebrovascular accident)    Hyponatremia    Pyelonephritis    Past Medical History  Past Medical History:   Diagnosis Date    Acute laryngitis     Acute nonsuppurative otitis media, unspecified laterality     Arm weakness     Arthritis     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HCC)     Bladder infection     Bronchitis     Constipation     Cough     Diabetes mellitus (HCC)     Dizziness     Dysfunction of eustachian tube     Erectile dysfunction of non-organic origin     Fatigue     Glaucoma     Hiatal hernia     Hypertension     Imbalance     Leg muscle spasm     MS (multiple sclerosis) ([facility])     Nephrolithiasis     Neurogenic bladder     No natural teeth     Sinus pain     Spinal stenosis     Strain of thoracic region     Stroke [facility])     Suprapubic catheter [facility])      Past Surgical History  Past Surgical History:   Procedure Laterality Date    APPENDECTOMY      BRAIN SURGERY      Coil placed in aneurysm    CYSTOSCOPY      CYSTOSCOPY      CYSTOSCOPY  06/11/2018    CYSTOSCOPY  01/15/2021    EYE SURGERY      transscleral cyclophotocoagulation noncontact YAG laser    MYRINGOTOMY      with ventilation tube insertion    AL LITHOLAPAXY SMPL/SM <2.5 CM N/A 5/7/2019    Procedure: CYSTOSCOPY, holmium laser litholapaxy of bladder stones, EXCHANGE OF SP TUBE;  Surgeon: Chun Michael MD;  [facility]: [facility];  Service: Urology    SUPRAPUBIC CATHETER INSERTION           08/20/23 1001   OT Last Visit   OT Visit Date 08/20/23   Note Type   Note type Evaluation Additional Comments greeted in supine and agreeable to skilled OT evaluation. Pain Assessment   Pain Assessment Tool 0-10   Pain Score No Pain   Restrictions/Precautions   Weight Bearing Precautions Per Order No   Other Precautions Contact/isolation;Multiple lines; Fall Risk   Home Living   Type of [address] [place] [address] Center  One level;Ramped entrance  (+1 DAI)   Bathroom Shower/Tub Walk-in shower   Bathroom Toilet Raised   Bathroom Equipment Grab bars in shower;Built-in shower seat;Grab bars around Berenice Walker;Cane;Wheelchair-manual;Grab bars; Hospital bed   Additional Comments brother is primary caregiver. Prior Function   Level of [place] Needs assistance with ADLs; Needs assistance with functional mobility; Needs assistance with IADLS   Lives With   (brother and sister)   [place] Help From Family  (senior life)   IADLs Family/Friend/Other provides transportation; Family/Friend/Other provides meals; Family/Friend/Other provides medication management   Falls in the last 6 months 0   Vocational Retired   Comments Prior to admission, pt lives with brother and sister in a single level home with ramps and 1 step to enter. Home has a walk in shower with grab bars and chair, raised toilets with grab bars and commode. Has A for all ADLs, IADLs and mobility. wc bound at baseline. Owns a RW, wc and hospital bed. Transfers Ax1 with sit<>stand lift. Never alone. Has senior life for [facility]  Services. Does not drive. Reports 0 fall sin the past 6 months.    ADL   Where Assessed Edge of bed   Eating Assistance 5  Supervision/Setup   Grooming Assistance 4  Minimal Assistance   UB Bathing Assistance 4  Minimal Assistance   LB Bathing Assistance 2  Maximal [place] 4  Minimal Assistance   LB Dressing Assistance 2  Maximal Assistance   Toileting Assistance  2  Maximal Assistance   Bed Mobility   Supine to Sit 2  Maximal assistance   Additional items Assist x 2;HOB elevated; Bedrails; Increased time required;Verbal cues;LE management   Additional Comments left seated up in chair, call light in reach, chair alarm activated. Transfers   Sit to Stand 4  Minimal assistance   Additional items Assist x 1; Increased time required;Verbal cues  (to quick move)   Stand to Sit 4  Minimal assistance   Additional items Assist x 1; Increased time required;Verbal cues  (from [facility])   Mechanical lift 4  Minimal assistance  (use of quick move EOB> chair.)   Additional items Assist x 2; Increased time required;Verbal cues   Balance   Static Sitting Fair   Dynamic Sitting Poor +   Static Standing Poor -   Activity Tolerance   Activity Tolerance Patient limited by fatigue   Medical Staff Made Aware PT Mayo Chavez   Nurse Made Aware RN Alesia   RUSAMI Assessment   RUE Assessment X   LUE Assessment   LUE Assessment X   Hand Function   Gross Motor Coordination Impaired   Fine Motor Coordination Impaired   Psychosocial   Psychosocial (WDL) WDL   Cognition   Overall Cognitive Status WFL   Arousal/Participation Cooperative   Attention Within functional limits   Orientation Level Oriented to person;Oriented to place; Disoriented to time  [employer], not day and year)   Memory Decreased short term memory;Decreased recall of precautions;Decreased recall of recent events   Following Commands Follows one step commands without difficulty   Comments cooperative. Assessment   Limitation Decreased ADL status; Decreased UE strength;Decreased Safe judgement during ADL;Decreased endurance;Decreased self-care trans   Prognosis Good   Assessment Melania Douglass is a 75 y.o. male seen for OT evaluation s/p admit to [facility] on 8/18/2023 w/ Sepsis due to urinary tract infection ([address]). Comorbidities affecting pt's functional performance at time of assessment include: DM, HTN and MS, Neurogenic bladder, CVA. Pt with active OT orders and activity orders for Activity as tolerated.  Personal factors affecting pt at time of IE include:DAI home environment, difficulty performing ADLs, difficulty performing IADLs and difficulty performing transfers/mobility. Prior to admission, pt lives with brother and sister in a single level home with ramps and 1 step to enter. Home has a walk in shower with grab bars and chair, raised toilets with grab bars and commode. Has A for all ADLs, IADLs and mobility. wc bound at baseline. Owns a RW, wc and hospital bed. Transfers Ax1 with sit<>stand lift. Never alone. Has senior life for [address] [place] [address],[address]  Services. Does not drive. Reports 0 fall sin the past 6 months. Upon evaluation: Pt currently requires [place] for UB ADLs, maxA for LB ADLs, maxA for toileting, maxAx2 for bed mobility, minAx1 to quick move for functional transfers, and n/a mobility 2* the following deficits impacting occupational performance:weakness. Pt to benefit from continued skilled OT tx while in the hospital to address deficits as defined above and maximize level of functional independence w ADL's and functional mobility. Occupational Performance areas to address include: bathing/shower, toilet hygiene, dressing, functional mobility and community mobility. From OT standpoint, recommendation at time of d/c would be home with home health services and family support. OT to follow pt on caseload 1-2x/wk. Goals   Patient Goals to go home. STG Time Frame 3-5   Short Term Goal #1 Pt will improve activity tolerance to G for min 30 min txment sessions for increase engagement in functional tasks   Short Term Goal #2 Pt will complete bed mobility at a Thuy level w/ G balance/safety demonstrated to decrease caregiver assistance required   Short Term Goal  Pt will complete toileting w/ Thuy w/ G hygiene/thoroughness using DME as needed   LTG Time Frame 10-14   Long Term Goal #1 Pt will improve functional transfers to Thuy on/off all surfaces using DME as needed w/ G balance/safety   Long Term Goal #2 Pt will complete WC mobility household distances.    Long Term Goal Pt will demonstrate G carryover of pt/caregiver education and training as appropriate w/o cues w/ good tolerance to increase safety during functional tasks   Plan   Treatment Interventions ADL retraining;Functional transfer training;UE strengthening/ROM; Endurance training;Patient/family training;Equipment evaluation/education; Neuromuscular reeducation; Compensatory technique education; Energy conservation; Activityengagement   Goal Expiration Date 09/03/23   OT Treatment Day 0   OT Frequency 1-2x/wk   Recommendation   OT Discharge Recommendation Home with home health rehabilitation   Additional Comments  The patient's raw score on the AM-PAC Daily Activity Inpatient Short Form is 15. A raw score of less than 19 suggests the patient may benefit from discharge to post-acute rehabilitation services. Please refer to the recommendation of the Occupational Therapist for safe discharge planning.    AM-PAC Daily Activity Inpatient   Lower Body Dressing 2   Bathing 2   Toileting 2   Upper Body Dressing 3   Grooming 3   Eating 3   Daily Activity Raw Score 15   Daily Activity Standardized Score (Calc for Raw Score >=11) 34.69   AM-PAC Applied Cognition Inpatient   Following a Speech/Presentation 4   Understanding Ordinary Conversation 4   Taking Medications 3   Remembering Where Things Are Placed or Put Away 2   Remembering List of 4-5 Errands 2   Taking Care of Complicated Tasks 2   Applied Cognition Raw Score 17   Applied Cognition Standardized Score 36.52   Stephania Acosta, OT

## 2023-08-20 NOTE — PHYSICAL THERAPY NOTE
Physical Therapy Evaluation:    2 forms of pt ID verified:name,birthdate and pt ID [email or web address]    Patient's Name: Reina Cisneros    Admitting Diagnosis  Leucocytosis [D72.829]  Dyspnea [R06.00]  SOB (shortness of breath) [R06.02]  Abdominal pain [R10.9]  Pyelonephritis [N12]  Tachycardia [R00.0]  SIRS (systemic inflammatory response syndrome) (HCC) [R65.10]    Problem List  Patient Active Problem List   Diagnosis    Confusion    Cellulitis    Diabetic neuropathy ([address])    Cervical spinal stenosis    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HCC)    Benign colon polyp    Chronic suprapubic catheter (HCC)    Esophageal reflux    Fatty liver    Generalized anxiety disorder    Glaucoma    Hyperlipidemia    Primary hypertension    Multiple sclerosis ([address])    Obstructive sleep apnea    Thyroid nodule    Type 2 diabetes mellitus with hyperglycemia, without long-term current use of insulin (HCC)    Urinary retention    History of CVA (cerebrovascular accident)    Bladder stones    Bladder neck contracture    Pancreatic mass    Pancreatic lesion    Sepsis ([address])    Head injury    Excessive daytime sleepiness    Shortness of breath    Acute metabolic encephalopathy    Hyponatremia    Chronic diastolic congestive heart failure ([address])    Pyelonephritis    Constipation    Sepsis due to urinary tract infection ([address])       Past Medical History  Past Medical History:   Diagnosis Date    Acute laryngitis     Acute nonsuppurative otitis media, unspecified laterality     Arm weakness     Arthritis     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HCC)     Bladder infection     Bronchitis     Constipation     Cough     Diabetes mellitus (HCC)     Dizziness     Dysfunction of eustachian tube     Erectile dysfunction of non-organic origin     Fatigue     Glaucoma     Hiatal hernia     Hypertension     Imbalance     Leg muscle spasm     MS (multiple sclerosis) (HCC)     Nephrolithiasis     Neurogenic bladder     No natural teeth     Sinus pain     Spinal stenosis     Strain of thoracic region     Stroke [facility])     Suprapubic catheter [facility])        Past Surgical History  Past Surgical History:   Procedure Laterality Date    APPENDECTOMY      BRAIN SURGERY      Coil placed in aneurysm    CYSTOSCOPY      CYSTOSCOPY      CYSTOSCOPY  06/11/2018    CYSTOSCOPY  01/15/2021    EYE SURGERY      transscleral cyclophotocoagulation noncontact YAG laser    MYRINGOTOMY      with ventilation tube insertion    SD LITHOLAPAXY SMPL/SM <2.5 CM N/A 5/7/2019    Procedure: CYSTOSCOPY, holmium laser litholapaxy of bladder stones, EXCHANGE OF SP TUBE;  Surgeon: Alvin Hirsch MD;  [facility]: [facility];  Service: Urology    SUPRAPUBIC CATHETER INSERTION          08/20/23 1030   PT Last Visit   PT Visit Date 08/20/23   Note Type   Note type Evaluation   Pain Assessment   Pain Assessment Tool 0-10   Pain Score No Pain   Restrictions/Precautions   Weight Bearing Precautions Per Order Yes   Other Precautions Contact/isolation; Chair Alarm; Bed Alarm; Fall Risk;Telemetry;Multiple lines  (impulaive)   Home Living   Type of [address] [place] [address] entrance;Stairs to enter without rails; One level;Performs ADLs on one level; Able to live on main level with bedroom/bathroom  (1 DAI)   Home Equipment Wheelchair-manual;Cane;Walker; Hospital bed  (sit to stand machine,)   Additional Comments pt lives with brother and sister who A with daily care, PTA pt needed A for ADLs,mobility and IADLs,[facility]   Prior Function   Level of [place] Needs assistance with ADLs; Needs assistance with functional mobility; Needs assistance with IADLS   Lives With Family  (brother and sister)   Oliva Rivera Help From Family  (daily with care)   IADLs Family/Friend/Other provides medication management; Family/Friend/Other provides meals; Family/Friend/Other provides transportation   Falls in the last 6 months 0  (per pt)   General   Additional Pertinent History h/o MS, sepsis 2* UTI, h/o CVA, urosepsis in June 2023, SOB and (+)N   Family/Caregiver Present No   Cognition   Overall Cognitive Status WFL   Arousal/Participation Cooperative   Orientation Level Oriented X4   Following Commands Follows one step commands with increased time or repetition   Subjective   Subjective Pt supine in bed resting comfortably; pt willing and agreeable to work with PT and to participate in therapy intervention; "I just feel so stiff, I'm glad I am getting out of bed". RLE Assessment   RLE Assessment   (very limited, (+) tone)   LLE Assessment   LLE Assessment   (very limited, (+)tone)   Coordination   Sensation WFL   Light Touch   RLE Light Touch Grossly intact   LLE Light Touch Grossly intact   Bed Mobility   Supine to Sit 2  Maximal assistance   Additional items Assist x 2;HOB elevated; Bedrails; Increased time required;Verbal cues;LE management   Transfers   Sit to Stand 4  Minimal assistance   Additional items Assist x 1;Bedrails; Increased time required;Verbal cues   Stand to Sit 4  Minimal assistance   Additional items Assist x 1; Armrests; Increased time required;Verbal cues   Additional Comments use of smooth  to A OOB->chair. Pt and pts family use sit->stand machine at home PTA for mobility from surface to surface   Ambulation/Elevation   Gait Assistance Not tested  (pt is nonambulatory)   Balance   Static Sitting Fair +   Dynamic Sitting Poor +   Activity Tolerance   Activity Tolerance   (fair)   Medical Staff Made Aware  [facility]   Nurse Made Aware yes   Assessment   Prognosis Fair   Assessment pt is a 76 yo male admitted to [facility] 2* sepsis due to UTI, h/o CVA and h/o MS, SOB and (+)N; pt was recently admitted 2* urosepsis in June 2023. Pt currently is at functional mobility baseline, use of smooth  for OOB to chair activity needing min Ax1 and bed mobility maxAx2. Recommend daily OOB with use of smooth  via non PT vs restorative therapy aide. Pt agreed.  No further skilled inpt PT services needed at this time, DC pt from skilled inpt PT. Recommend pt wound benefit from ROM exercises by restorative therapy aide daily, pt agreed. Pt reports family A with daily care PTA, use of manual wc to exit and enter the home with A from family dependently. Goals   Patient Goals to be less stiff   Short Term Goal #1 no goals at this time 2* DC pt from skilled inpt PT   PT Treatment Day 0   Plan   Treatment/Interventions OT; Spoke to nursing   Recommendation   PT Discharge Recommendation No rehabilitation needs  (cont A from family upon DC)   Equipment Recommended Wheelchair; Other (Comment)  (smooth , wc)   AM-PAC Basic Mobility Inpatient   Turning in Flat Bed Without Bedrails 1   Lying on Back to Sitting on Edge of Flat Bed Without Bedrails 1   Moving Bed to Chair 1   Standing Up From Chair Using Arms 3   Walk in Room 1   Climb 3-5 Stairs With Railing 1   Basic Mobility Inpatient Raw Score 8   Highest Level Of Mobility   JH-HLM Goal 3: Sit at edge of bed   JH-HLM Achieved 3: Sit at edge of bed             @Mary Huynh, PT, DPT@

## 2023-08-20 NOTE — ASSESSMENT & PLAN NOTE
· Continue home regimen with propranolol LA 60 mg daily and Norvasc 10 mg daily  · Blood pressure adequately controlled.   Continue medications and monitor, titrate as needed

## 2023-08-20 NOTE — PLAN OF CARE
Problem: OCCUPATIONAL THERAPY ADULT  Goal: Performs self-care activities at highest level of function for planned discharge setting. See evaluation for individualized goals. Description: Treatment Interventions: ADL retraining, Functional transfer training, UE strengthening/ROM, Endurance training, Patient/family training, Equipment evaluation/education, Neuromuscular reeducation, Compensatory technique education, Energy conservation, Activityengagement          See flowsheet documentation for full assessment, interventions and recommendations. Note: Limitation: Decreased ADL status, Decreased UE strength, Decreased Safe judgement during ADL, Decreased endurance, Decreased self-care trans  Prognosis: Good  Assessment: Rozina Simmons is a 75 y.o. male seen for OT evaluation s/p admit to [facility] on 8/18/2023 w/ Sepsis due to urinary tract infection ([address]). Comorbidities affecting pt's functional performance at time of assessment include: DM, HTN and MS, Neurogenic bladder, CVA. Pt with active OT orders and activity orders for Activity as tolerated. Personal factors affecting pt at time of IE include:DAI home environment, difficulty performing ADLs, difficulty performing IADLs and difficulty performing transfers/mobility. Prior to admission, pt lives with brother and sister in a single level home with ramps and 1 step to enter. Home has a walk in shower with grab bars and chair, raised toilets with grab bars and commode. Has A for all ADLs, IADLs and mobility. wc bound at baseline. Owns a RW, wc and hospital bed. Transfers Ax1 with sit<>stand lift. Never alone. [facility]. Does not drive. Reports 0 fall sin the past 6 months. Upon evaluation: Pt currently requires Thuy for UB ADLs, maxA for LB ADLs, maxA for toileting, maxAx2 for bed mobility, minAx1 to quick move for functional transfers, and n/a mobility 2* the following deficits impacting occupational performance:weakness.  Pt to benefit from continued skilled OT tx while in the hospital to address deficits as defined above and maximize level of functional independence w ADL's and functional mobility. Occupational Performance areas to address include: bathing/shower, toilet hygiene, dressing, functional mobility and community mobility. From OT standpoint, recommendation at time of d/c would be home with home health services and family support. OT to follow pt on caseload 1-2x/wk.      OT Discharge Recommendation: Home with home health rehabilitation

## 2023-08-20 NOTE — ASSESSMENT & PLAN NOTE
· History of MS with neurogenic bladder, BPH, previous TURP managed with chronic suprapubic catheter  · Urology consulted: Performed suprapubic catheter exchange 8/20

## 2023-08-20 NOTE — UTILIZATION REVIEW
Initial Clinical Review    Admission: Date/Time/Statement:   Admission Orders (From admission, onward)     Ordered        08/18/23 2345  INPATIENT ADMISSION  Once                      Orders Placed This Encounter   Procedures   • INPATIENT ADMISSION     Standing Status:   Standing     Number of Occurrences:   1     Order Specific Question:   Level of Care     Answer:   Med Surg [16]     Order Specific Question:   Estimated length of stay     Answer:   More than 2 Midnights     Order Specific Question:   Certification     Answer:   I certify that inpatient services are medically necessary for this patient for a duration of greater than two midnights. See H&P and MD Progress Notes for additional information about the patient's course of treatment. ED Arrival Information     Expected   -    Arrival   8/18/2023 20:16    Acuity   Urgent            Means of arrival   Ambulance    Escorted by   [facility] ([address] [place] [address])    Service   Hospitalist    Admission type   Emergency            Arrival complaint   SOB           Chief Complaint   Patient presents with   • Shortness of Breath     States SOB for last 2 hrs. Also complaining of nausea. Denies abd pain or chest pain. Initial Presentation: 75 y.o. male with past medical history of multiple sclerosis, history of CVA, chronic suprapubic catheter, type 2 diabetes, hypertension, hyperlipidemia, anxiety disorder to ED by ems admitted Inpatient d/t sepsis d/t UTI. presents to the hospital with abdominal pain. felt jittery and nauseous with loss of appetite. Patient met sepsis criteria on admission secondary to urinary tract infection/pyelonephritis. CT shows cystitis with bilateral pyelonephritis without hydronephrosis. Bld & urine cx. IV antibiotics. IVF. Urology consult to exchange suprapubic catheter. Consider ID consult. • 8/19 UROLOGY CONSULT:History of BPH status post TURP. Chronic urinary retention--despite TURP and bladder outlet procedure, likely due to neurogenic bladder from CVA and mass. Chronic suprapubic catheter status--secondary to previous. Presumed UTI--urine and blood cultures are pending. Urine analysis alone is not a strong clinical indicators of infection in patients with chronic indwelling urinary drains. Empiric antibiotics while culture data awaited and upcoming suprapubic exchange once supply provided. Date: 8/19  Day 2: feels much improved today. Bld & urine cx. IV antibiotics. IVF d/c'd. Date: 8/20    Day 3: Has surpassed a 2nd midnight with active treatments and services, which include Bld & urine cx. IV antibiotics. need Urology to  exchange suprapubic catheter. Seen yesterday but unfortunately 20 Sami suprapubic tube not available. Unfortunately, the only available supplies at current is a 20 [place] three-way Cage catheter. After removal of the patient's current indwelling suprapubic tube, the os was cleaned with Betadine. A new latex 25 Sami Cage catheter was advanced into the suprapubic region. Catheter was hooked to gravity drainage.       ED Triage Vitals   Temperature Pulse Respirations Blood Pressure SpO2   08/18/23 2025 08/18/23 2025 08/19/23 0100 08/18/23 2025 08/18/23 2025   98.4 °F (36.9 °C) (!) 111 20 (!) 185/96 98 %      Temp Source Heart Rate Source Patient Position - Orthostatic VS BP Location FiO2 (%)   08/18/23 2025 08/18/23 2025 08/18/23 2025 08/18/23 2025 --   Oral Monitor Lying Right arm       Pain Score       08/18/23 2025       3          Wt Readings from Last 1 Encounters:   08/19/23 72.3 kg (159 lb 6.3 oz)     Additional Vital Signs:   08/20/23 0735 97 °F (36.1 °C) Abnormal  77 20 119/58 -- 94 % None (Room air) Lying   08/19/23 2335 -- 68 20 115/57 -- 97 % None (Room air) Lying   08/19/23 1513 97.2 °F (36.2 °C) Abnormal  71 20 120/56 -- 99 % None (Room air) Lying   08/19/23 0736 97.5 °F (36.4 °C) 78 20 142/63 -- 97 % None (Room air) Lying   08/19/23 0100 97.6 °F (36.4 °C) 108 Abnormal  20 156/90 106 -- -- Lying 08/19/23 0036 -- 109 Abnormal  -- 166/78 112 98 % None (Room air) Lying   08/18/23 2217 -- 100 -- 181/81 Abnormal  117 99 % None (Room air) Lying   08/18/23 2025 98.4 °F (36.9 °C) 111 Abnormal  -- 185/96 Abnormal  123 98 % None (Room air) Lying       Pertinent Labs/Diagnostic Test Results:   8/18 ekg:  Sinus tachycardia  Otherwise normal ECG  When compared with ECG of 18-AUG-2023 20:31, (unconfirmed)  Premature supraventricular complexes are no longer Present  Confirmed by Trang Burleson ([id number]) on 8/19/2023 5:13:29 AM    Date/Time: 8/18/2023 9:40 PM     Performed by: Nik Chan MD   Authorized by: Nik Chan MD     Indications / Diagnosis:  SOB   ECG reviewed by me, the ED Provider: yes     Patient location:  ED   Interpretation:     Interpretation: normal     Rate:     ECG rate:  059     ECG rate assessment: tachycardic     Rhythm:     Rhythm: sinus rhythm     Ectopy:     Ectopy: none     QRS:     QRS axis:  Normal   Conduction:     Conduction: normal     ST segments:     ST segments:  Normal   T waves:     T waves: normal        Sinus tachycardia with Premature supraventricular complexes  Otherwise normal ECG  When compared with ECG of 30-JUN-2023 17:13,  Premature supraventricular complexes are now Present  Confirmed by Trang Burleson ([id number]) on 8/19/2023 5:13:18 AM    PE Study with CT Abdomen and Pelvis with contrast   Final Result by Min Murry MD (08/18 2325)      Bilateral right greater than left pyelonephritis      Cystitis               Workstation performed: [id number]               Results from last 7 days   Lab Units 08/20/23  0502 08/19/23  0534 08/18/23  2105   WBC Thousand/uL 14.94* 17.05* 18.76*   HEMOGLOBIN g/dL 12.8 12.8 15.2   HEMATOCRIT % 38.2 38.5 45.5   PLATELETS Thousands/uL 399* 396* 434*   NEUTROS ABS Thousands/µL 9.12* 10.76* 13.85*         Results from last 7 days   Lab Units 08/20/23  0502 08/19/23  0534 08/18/23  2105   SODIUM mmol/L 131* 130* 130*   POTASSIUM mmol/L 3.6 4.1 4.1 CHLORIDE mmol/L 99 98 95*   CO2 mmol/L 25 27 27   ANION GAP mmol/L 7 5 8   BUN mg/dL 10 7 9   CREATININE mg/dL 0.95 0.91 0.86   EGFR ml/min/1.73sq m 78 82 85   CALCIUM mg/dL 9.1 9.3 10.1     Results from last 7 days   Lab Units 08/18/23  2105   AST U/L 11*   ALT U/L 12   ALK PHOS U/L 105*   TOTAL PROTEIN g/dL 8.0   ALBUMIN g/dL 3.8   TOTAL BILIRUBIN mg/dL 0.48     Results from last 7 days   Lab Units 08/20/23  1103 08/20/23  0738 08/19/23  2019 08/19/23  1614 08/19/23  1104 08/19/23  0738   POC GLUCOSE mg/dl 300* 196* 352* 228* 251* 185*     Results from last 7 days   Lab Units 08/20/23  0502 08/19/23  0534 08/18/23  2105   GLUCOSE RANDOM mg/dL 196* 193* 190*     Results from last 7 days   Lab Units 08/18/23  2300   OSMOLALITY, SERUM mmol/       Results from last 7 days   Lab Units 08/18/23  2300 08/18/23  2105   HS TNI 0HR ng/L  --  5   HS TNI 2HR ng/L 5  --    HSTNI D2 ng/L 0  --          Results from last 7 days   Lab Units 08/18/23  2105   PROTIME seconds 12.8   INR  0.96   PTT seconds 28         Results from last 7 days   Lab Units 08/20/23  0502 08/18/23  2105   PROCALCITONIN ng/ml 0.09 0.08     Results from last 7 days   Lab Units 08/18/23  2158   LACTIC ACID mmol/L 1.7             Results from last 7 days   Lab Units 08/18/23  2105   BNP pg/mL 34       Results from last 7 days   Lab Units 08/19/23  1010 08/18/23  2300   OSMOLALITY, SERUM mmol/KG  --  282   OSMO UR mmol/  --      Results from last 7 days   Lab Units 08/19/23  1010 08/18/23  2216   CLARITY UA   --  Extra Turbid   COLOR UA   --  Light Orange   SPEC GRAV UA   --  1.027   PH UA   --  8.0   GLUCOSE UA mg/dl  --  200 (1/5%)*   KETONES UA mg/dl  --  Negative   BLOOD UA   --  Trace*   PROTEIN UA mg/dl  --  Trace*   NITRITE UA   --  Negative   BILIRUBIN UA   --  Negative   UROBILINOGEN UA (BE) mg/dl  --  <2.0   LEUKOCYTES UA   --  Large*   WBC UA /hpf  --  Innumerable*   RBC UA /hpf  --  10-20*   BACTERIA UA /hpf  --  Innumerable* EPITHELIAL CELLS WET PREP /hpf  --  None Seen   SODIUM UR  68  --        Results from last 7 days   Lab Units 08/18/23  2216 08/18/23  2150 08/18/23  2142   BLOOD CULTURE   --  No Growth at 24 hrs. No Growth at 24 hrs.    URINE CULTURE  Culture too young- will reincubate  --   --      ED Treatment:   Medication Administration from 08/18/2023 2015 to 08/19/2023 0059       Date/Time Order Dose Route Action Action by Comments                           08/18/2023 2301 EDT cefepime (MAXIPIME) IVPB (premix in dextrose) 2,000 mg 50 mL 2,000 mg Intravenous New Bag       08/18/2023 2347 EDT acetaminophen (TYLENOL) tablet 650 mg 650 mg Oral Given       08/18/2023 2349 EDT morphine injection 2 mg 2 mg Intravenous Given       08/18/2023 2354 EDT ondansetron (ZOFRAN) injection 4 mg 4 mg Intravenous Given          Past Medical History:   Diagnosis Date   • Acute laryngitis    • Acute nonsuppurative otitis media, unspecified laterality    • Arm weakness    • Arthritis    • 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HCC)    • Bladder infection    • Bronchitis    • Constipation    • Cough    • Diabetes mellitus ([facility])    • Dizziness    • Dysfunction of eustachian tube    • Erectile dysfunction of non-organic origin    • Fatigue    • Glaucoma    • Hiatal hernia    • Hypertension    • Imbalance    • Leg muscle spasm    • MS (multiple sclerosis) ([facility])    • Nephrolithiasis    • Neurogenic bladder    • No natural teeth    • Sinus pain    • Spinal stenosis    • Strain of thoracic region    • Stroke [facility])    • Suprapubic catheter ([address])      Present on Admission:  • Sepsis due to urinary tract infection ([address])  • Type 2 diabetes mellitus with hyperglycemia, without long-term current use of insulin ([facility])  • Multiple sclerosis ([facility])  • 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[facility])  • Hyperlipidemia  • Generalized anxiety disorder  • Primary hypertension  • Hyponatremia      Admitting Diagnosis: Leucocytosis [D72.829]  Dyspnea [R06.00]  SOB (shortness of breath) [R06.02]  Abdominal pain [R10.9]  Pyelonephritis [N12]  Tachycardia [R00.0]  SIRS (systemic inflammatory response syndrome) (HCC) [R65.10]  Age/Sex: 75 y.o. male  Admission Orders:  Scheduled Medications:  amLODIPine, 10 mg, Oral, Daily   And  atorvastatin, 80 mg, Oral, Daily  ampicillin, 1,000 mg, Intravenous, Q6H  cefTRIAXone, 1,000 mg, Intravenous, Q24H  clopidogrel, 75 mg, Oral, Daily  enoxaparin, 40 mg, Subcutaneous, Daily  gabapentin, 300 mg, Oral, BID  gabapentin, 600 mg, Oral, HS  insulin lispro, 1-6 Units, Subcutaneous, 4x Daily (AC & HS)  latanoprost, 1 drop, Both Eyes, HS  melatonin, 6 mg, Oral, HS  oxybutynin, 5 mg, Oral, Daily  pantoprazole, 40 mg, Oral, Early Morning  propranolol, 60 mg, Oral, Daily  senna-docusate sodium, 2 tablet, Oral, HS  sertraline, 25 mg, Oral, HS      Continuous IV Infusions:  sodium chloride 0.9 % infusion  Rate: 100 mL/hr Dose: 100 mL/hr  Freq: Continuous Route: IV  Indications of Use: IV Hydration  Last Dose: Stopped (08/19/23 1220)  Start: 08/19/23 0145 End: 08/19/23 0804 0222     0804-D/C'd  1220     0804-D/C'd  1532            PRN Meds:  acetaminophen, 975 mg, Oral, Q6H PRN  albuterol, 2 puff, Inhalation, Q6H PRN  ALPRAZolam, 0.25 mg, Oral, BID PRN  aluminum-magnesium hydroxide-simethicone, 30 mL, Oral, Q6H PRN  diphenhydrAMINE, 25 mg, Oral, Q6H PRN  ondansetron, 4 mg, Intravenous, Q6H PRN  oxyCODONE, 5 mg, Oral, 4x Daily PRN  oxyCODONE, 2.5 mg, Oral, Q6H PRN 8/19 x 1  polyethylene glycol, 17 g, Oral, Daily PRN  simethicone, 80 mg, Oral, 4x Daily PRN    SCD  PT/OT  CONS CARB DIET  OOB  I&O  TELE      IP CONSULT TO UROLOGY    Network Utilization Review Department  ATTENTION: Please call with any questions or concerns to [phone] and carefully listen to the prompts so that you are directed to the right person.  All voicemails are confidential.  Clarisse Camargo all requests for admission clinical reviews, approved or denied determinations and any other requests to dedicated fax number below belonging to the campus where the patient is receiving treatment.  List of dedicated fax numbers for the Facilities:  [place] DENIALS (Administrative/Medical Necessity) [phone]   [address] (Maternity/NICU/Pediatrics) [phone]   [address] [place] [address] [phone]   [facility] [address] [facility] [phone]   [address] [phone]   [address] [place] [address] [phone]

## 2023-08-20 NOTE — PHYSICAL THERAPY NOTE
Physical Therapy Evaluation:    2 forms of pt ID verified:name,birthdate and pt ID tiffanie    Patient's Name: Maday Frias    Admitting Diagnosis  Leucocytosis [D72.829]  Dyspnea [R06.00]  SOB (shortness of breath) [R06.02]  Abdominal pain [R10.9]  Pyelonephritis [N12]  Tachycardia [R00.0]  SIRS (systemic inflammatory response syndrome) (HCC) [R65.10]    Problem List  Patient Active Problem List   Diagnosis    Confusion    Cellulitis    Diabetic neuropathy ([address])    Cervical spinal stenosis    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HCC)    Benign colon polyp    Chronic suprapubic catheter (HCC)    Esophageal reflux    Fatty liver    Generalized anxiety disorder    Glaucoma    Hyperlipidemia    Primary hypertension    Multiple sclerosis ([address])    Obstructive sleep apnea    Thyroid nodule    Type 2 diabetes mellitus with hyperglycemia, without long-term current use of insulin (HCC)    Urinary retention    History of CVA (cerebrovascular accident)    Bladder stones    Bladder neck contracture    Pancreatic mass    Pancreatic lesion    Sepsis ([address])    Head injury    Excessive daytime sleepiness    Shortness of breath    Acute metabolic encephalopathy    Hyponatremia    Chronic diastolic congestive heart failure ([address])    Pyelonephritis    Constipation    Sepsis due to urinary tract infection ([address])       Past Medical History  Past Medical History:   Diagnosis Date    Acute laryngitis     Acute nonsuppurative otitis media, unspecified laterality     Arm weakness     Arthritis     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HCC)     Bladder infection     Bronchitis     Constipation     Cough     Diabetes mellitus (HCC)     Dizziness     Dysfunction of eustachian tube     Erectile dysfunction of non-organic origin     Fatigue     Glaucoma     Hiatal hernia     Hypertension     Imbalance     Leg muscle spasm     MS (multiple sclerosis) (HCC)     Nephrolithiasis     Neurogenic bladder     No natural teeth     Sinus pain     Spinal stenosis     Strain of thoracic region     Stroke [facility])     Suprapubic catheter [facility])        Past Surgical History  Past Surgical History:   Procedure Laterality Date    APPENDECTOMY      BRAIN SURGERY      Coil placed in aneurysm    CYSTOSCOPY      CYSTOSCOPY      CYSTOSCOPY  06/11/2018    CYSTOSCOPY  01/15/2021    EYE SURGERY      transscleral cyclophotocoagulation noncontact YAG laser    MYRINGOTOMY      with ventilation tube insertion    UT LITHOLAPAXY SMPL/SM <2.5 CM N/A 5/7/2019    Procedure: CYSTOSCOPY, holmium laser litholapaxy of bladder stones, EXCHANGE OF SP TUBE;  Surgeon: Eloy Reynolds MD;  [facility];  Service: Urology    SUPRAPUBIC CATHETER INSERTION          08/20/23 1030   PT Last Visit   PT Visit Date 08/20/23   Note Type   Note type Evaluation   Pain Assessment   Pain Assessment Tool 0-10   Pain Score No Pain   Restrictions/Precautions   Weight Bearing Precautions Per Order Yes   RLE Weight Bearing Per Order NWB  (per past PT and medical documentation, NWB RLE and surgical shoe)   Braces or Orthoses Other (Comment)  (LLE personal prosthesis)   Other Precautions Contact/isolation; Chair Alarm; Bed Alarm; Fall Risk;Telemetry;Multiple lines;WBS  (impulaive)   Home Living   Type of [address] entrance;Stairs to enter without rails; One level;Performs ADLs on one level; Able to live on main level with bedroom/bathroom  (1 DAI)   Home Equipment Wheelchair-manual;Cane;Walker; Hospital bed  (sit to stand machine,)   Additional Comments pt lives with brother and sister who A with daily care, PTA pt needed A for ADLs,mobility and IADLs,was receiving A [facility]   Prior Function   Level of [place] Needs assistance with ADLs; Needs assistance with functional mobility; Needs assistance with IADLS   Lives With Family  (brother and sister)   [address] Help From Family  (daily with care)   IADLs Family/Friend/Other provides medication management; Family/Friend/Other provides meals; Family/Friend/Other provides transportation   Falls in the last 6 months 0  (per pt)   General   Additional Pertinent History h/o MS, sepsis 2* UTI, h/o CVA, urosepsis in June 2023, SOB and (+)N   Family/Caregiver Present No   Cognition   Overall Cognitive Status WFL   Arousal/Participation Cooperative   Orientation Level Oriented X4   Following Commands Follows one step commands with increased time or repetition   Subjective   Subjective Pt supine in bed resting comfortably; pt willing and agreeable to work with PT and to participate in therapy intervention; "I just feel so stiff, I'm glad I am getting out of bed". RLE Assessment   RLE Assessment   (very limited, (+) tone)   LLE Assessment   LLE Assessment   (very limited, (+)tone)   Coordination   Sensation WFL   Light Touch   RLE Light Touch Grossly intact   LLE Light Touch Grossly intact   Bed Mobility   Supine to Sit 2  Maximal assistance   Additional items Assist x 2;HOB elevated; Bedrails; Increased time required;Verbal cues;LE management   Transfers   Sit to Stand 4  Minimal assistance   Additional items Assist x 1;Bedrails; Increased time required;Verbal cues   Stand to Sit 4  Minimal assistance   Additional items Assist x 1; Armrests; Increased time required;Verbal cues   Additional Comments use of smooth  to A OOB->chair. Pt and pts family use sit->stand machine at home PTA for mobility from surface to surface   Ambulation/Elevation   Gait Assistance Not tested  (pt is nonambulatory)   Balance   Static Sitting Fair +   Dynamic Sitting Poor +   Activity Tolerance   Activity Tolerance   (fair)   Medical Staff Made Aware  [facility]   Nurse Made Aware yes   Assessment   Prognosis Fair   Assessment pt is a 76 yo male admitted to [facility] 2* sepsis due to UTI, h/o CVA and h/o MS, SOB and (+)N; pt was recently admitted 2* urosepsis in June 2023.  Pt currently is at functional mobility baseline, use of smooth  for OOB to chair activity needing min Ax1 and bed mobility maxAx2. Recommend daily OOB with use of smooth  via non PT vs restorative therapy aide. Pt agreed. No further skilled inpt PT services needed at this time, DC pt from skilled inpt PT. Recommend pt wound benefit from ROM exercises by restorative therapy aide daily, pt agreed. Pt reports family A with daily care PTA, use of manual wc to exit and enter the home with A from family dependently. Goals   Patient Goals to be less stiff   Short Term Goal #1 no goals at this time 2* DC pt from skilled inpt PT   PT Treatment Day 0   Plan   Treatment/Interventions OT; Spoke to nursing   Recommendation   PT Discharge Recommendation No rehabilitation needs  (cont A from family upon DC)   Equipment Recommended Wheelchair; Other (Comment)  (smooth , wc)   AM-PAC Basic Mobility Inpatient   Turning in Flat Bed Without Bedrails 1   Lying on Back to Sitting on Edge of Flat Bed Without Bedrails 1   Moving Bed to Chair 1   Standing Up From Chair Using Arms 3   Walk in Room 1   Climb 3-5 Stairs With Railing 1   Basic Mobility Inpatient Raw Score 8   Highest Level Of Mobility   JH-HLM Goal 3: Sit at edge of bed   JH-HLM Achieved 3: Sit at edge of bed             @Mary Huynh, PT, DPT@

## 2023-08-20 NOTE — ASSESSMENT & PLAN NOTE
· Patient presented with criteria for sepsis   · urinalysis with negative nitrates, large leukocyte Estrace, and innumerable white blood cells  · Initially this was attributed to colonization, due to patient's chronic suprapubic catheter, rather than true infection  · However CT scan revealed bilateral right greater than left pyelonephritis and cystitis  · Urine cultures pending  · Likely secondary to pyelonephritis, secondary to chronic suprapubic catheter  · Continue antibiotics while cultures are pending

## 2023-08-20 NOTE — ASSESSMENT & PLAN NOTE
Patient is a 79-year-old male with past medical history significant for multiple sclerosis, urinary retention with chronic suprapubic catheter and hypertension who presented to the ER with fever and symptoms that he states are identical to his previous urinary tract infections     · Diagnosed with sepsis, POA, with leukocytosis and tachycardia  · No lactic acidosis  · CT skin of the abdomen and pelvis revealed bilateral pyelonephritis   · Urine cultures and blood cultures pending  · continue empiric antibiotics  · Patient with recent admission 6/23 with sepsis secondary to obstructed and infected suprapubic catheter with cultures growing Enterococcus  · Continue IV Rocephin and ampicillin as previously recommended by infectious disease on prior admission  · Follow-up blood cultures urine cultures

## 2023-08-20 NOTE — UTILIZATION REVIEW
Notification of Unplanned, Urgent, or   Emergency Inpatient Admission   [address] [facility], [facility]  Tax ID: [id number]  NPI: [id number]  Place of Service: [address]  Admission Level of Care: Inpatient  Place of Service Code: 24     Attending Physician Information  Attending Name and NPI#: Delaney Blank, Joi Key Rivas [place]  Phone: [phone]     Admission Information  Inpatient Admission Date/Time: 8/18/23 11:45 PM  Discharge Date/Time: No discharge date for patient encounter. Admitting Diagnosis Code/Description:  Leucocytosis [D72.829]  Dyspnea [R06.00]  SOB (shortness of breath) [R06.02]  Abdominal pain [R10.9]  Pyelonephritis [N12]  Tachycardia [R00.0]  SIRS (systemic inflammatory response syndrome) ([address]) [R65.10]     Utilization Review Contact  Lorenzo Bales, Utilization   Phone: [phone]  Fax: [phone]  Email: [email or web address]  Contact for approvals/pending authorizations, clinical reviews, and discharge. Physician Advisory Services Contact  Medical Necessity Denial & [id number]-[id number] Discussion  Phone: [phone]  Fax: [phone]  Email: [email or web address]

## 2023-08-20 NOTE — PROGRESS NOTES
[facility]  Progress Note  Name: Jeni Carlton I  MRN: [id number]  Unit/Bed#: E4 -01 I Date of Admission: 8/18/2023   Date of Service: 8/20/2023 I Hospital Day: 2    Assessment/Plan   * Sepsis due to urinary tract infection [facility])  Assessment & Plan  Patient is a 78-year-old male with past medical history significant for multiple sclerosis, urinary retention with chronic suprapubic catheter and hypertension who presented to the ER with fever and symptoms that he states are identical to his previous urinary tract infections     · Diagnosed with sepsis, POA, with leukocytosis and tachycardia  · No lactic acidosis  · CT skin of the abdomen and pelvis revealed bilateral pyelonephritis   · Urine cultures and blood cultures pending  · continue empiric antibiotics  · Patient with recent admission 6/23 with sepsis secondary to obstructed and infected suprapubic catheter with cultures growing Enterococcus  · Continue IV Rocephin and ampicillin as previously recommended by infectious disease on prior admission  · Follow-up blood cultures urine cultures      Pyelonephritis  Assessment & Plan  · Patient presented with criteria for sepsis   · urinalysis with negative nitrates, large leukocyte Estrace, and innumerable white blood cells  · Initially this was attributed to colonization, due to patient's chronic suprapubic catheter, rather than true infection  · However CT scan revealed bilateral right greater than left pyelonephritis and cystitis  · Urine cultures pending  · Likely secondary to pyelonephritis, secondary to chronic suprapubic catheter  · Continue antibiotics while cultures are pending    Hyponatremia  Assessment & Plan  · Per old records, patient has chronic hyponatremia with a baseline sodium ranging approximately 129-134  · Current sodium at his baseline  · Possibly medication related    History of CVA (cerebrovascular accident)  Assessment & Plan  · History of CVA in 2018  · On Plavix and statin    Type 2 diabetes mellitus with hyperglycemia, without long-term current use of insulin (HCC)  Assessment & Plan  Adequate control inpatient  Trulicity on hold  Continue sliding scale    Multiple sclerosis (HCC)  Assessment & Plan  · Longstanding history of MS since 1960's, not maintained on DMD.  Wheelchair-bound  · [facility]    Primary hypertension  Assessment & Plan  · Continue home regimen with propranolol LA 60 mg daily and Norvasc 10 mg daily  · Blood pressure adequately controlled. 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 Reviewed all test results and treatment plan. Answered all questions    VTE Pharmacologic Prophylaxis: Enoxaparin (Lovenox)  VTE Mechanical Prophylaxis: sequential compression device    Discussed with patient's nurse and     Certification Statement: The patient will continue to require additional inpatient hospital stay due to need for further acute intervention for sepsis, pyelonephritis, IV antibiotic    Status: inpatient     ===================================================================    Subjective:  Notes he feels better. He notes upon coming to the ER he had a fever, and felt identical to his previous urinary tract infection.     He denies any current pain anywhere. He denies any suprapubic or bladder pain. Denies any discomfort from the suprapubic catheter. He denies any abdominal pain, nausea, vomiting, diarrhea. He denies any chest pain. Denies any shortness of breath or cough. Notes he is tolerating p.o. Denies any nausea, vomiting, diarrhea or constipation. Denies any dizziness or lightheadedness. Notes he feels almost back to his baseline. Physical Exam:   Temp:  [96.6 °F (35.9 °C)-97 °F (36.1 °C)] 96.6 °F (35.9 °C)  HR:  [68-77] 73  Resp:  [20] 20  BP: (115-131)/(57-62) 131/62    Gen:  Pleasant, non-tachypnic, non-dyspnic. Conversant. Heart: regular rate and rhythm, S1S2 present, no murmur, rub or gallop  Lungs: clear to ausculatation bilaterally. No wheezing, crackles, or rhonchi. No accessory muscle use or respiratory distress. Abd: soft, non-tender, non-distended. NABS, no guarding, rebound or peritoneal signs. Suprapubic catheter in place. No surrounding erythema or purulent drainage  Extremities: no clubbing, cyanosis or edema. 2+pedal pulses bilaterally. Neuro: awake, alert. Makes eye contact. Fluent speech. Interactive. No somnolence or lethargy  Skin: warm and dry: no petechiae, purpura and rash.     LABS:   Results from last 7 days   Lab Units 08/20/23  0502 08/19/23  0534 08/18/23  2105   WBC Thousand/uL 14.94* 17.05* 18.76*   HEMOGLOBIN g/dL 12.8 12.8 15.2   HEMATOCRIT % 38.2 38.5 45.5   PLATELETS Thousands/uL 399* 396* 434*     Results from last 7 days   Lab Units 08/20/23  0502 08/19/23  0534 08/18/23  2105   POTASSIUM mmol/L 3.6 4.1 4.1   CHLORIDE mmol/L 99 98 95*   CO2 mmol/L 25 27 27   BUN mg/dL 10 7 9   CREATININE mg/dL 0.95 0.91 0.86   CALCIUM mg/dL 9.1 9.3 10.1       Hospital Data:  8/18: CTA chest:  Bilateral right greater than left pyelonephritis   Cystitis    Cardiology:  8/18: Blood cultures pending x2  8/18 urine culture: Pending    ---------------------------------------------------------------------------------------------------------------  This note has been constructed using a voice recognition system.

## 2023-08-20 NOTE — PHYSICAL THERAPY NOTE
Physical Therapy Evaluation:    2 forms of pt ID verified:name,birthdate and pt ID tiffanie    Patient's Name: Maday Frias    Admitting Diagnosis  Leucocytosis [D72.829]  Dyspnea [R06.00]  SOB (shortness of breath) [R06.02]  Abdominal pain [R10.9]  Pyelonephritis [N12]  Tachycardia [R00.0]  SIRS (systemic inflammatory response syndrome) (HCC) [R65.10]    Problem List  Patient Active Problem List   Diagnosis    Confusion    Cellulitis    Diabetic neuropathy ([address])    Cervical spinal stenosis    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HCC)    Benign colon polyp    Chronic suprapubic catheter (HCC)    Esophageal reflux    Fatty liver    Generalized anxiety disorder    Glaucoma    Hyperlipidemia    Primary hypertension    Multiple sclerosis ([address])    Obstructive sleep apnea    Thyroid nodule    Type 2 diabetes mellitus with hyperglycemia, without long-term current use of insulin (HCC)    Urinary retention    History of CVA (cerebrovascular accident)    Bladder stones    Bladder neck contracture    Pancreatic mass    Pancreatic lesion    Sepsis ([address])    Head injury    Excessive daytime sleepiness    Shortness of breath    Acute metabolic encephalopathy    Hyponatremia    Chronic diastolic congestive heart failure ([address])    Pyelonephritis    Constipation    Sepsis due to urinary tract infection ([address])       Past Medical History  Past Medical History:   Diagnosis Date    Acute laryngitis     Acute nonsuppurative otitis media, unspecified laterality     Arm weakness     Arthritis     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HCC)     Bladder infection     Bronchitis     Constipation     Cough     Diabetes mellitus (HCC)     Dizziness     Dysfunction of eustachian tube     Erectile dysfunction of non-organic origin     Fatigue     Glaucoma     Hiatal hernia     Hypertension     Imbalance     Leg muscle spasm     MS (multiple sclerosis) (HCC)     Nephrolithiasis     Neurogenic bladder     No natural teeth     Sinus pain     Spinal stenosis     Strain of thoracic region     Stroke [facility])     Suprapubic catheter [facility])        Past Surgical History  Past Surgical History:   Procedure Laterality Date    APPENDECTOMY      BRAIN SURGERY      Coil placed in aneurysm    CYSTOSCOPY      CYSTOSCOPY      CYSTOSCOPY  06/11/2018    CYSTOSCOPY  01/15/2021    EYE SURGERY      transscleral cyclophotocoagulation noncontact YAG laser    MYRINGOTOMY      with ventilation tube insertion    NH LITHOLAPAXY SMPL/SM <2.5 CM N/A 5/7/2019    Procedure: CYSTOSCOPY, holmium laser litholapaxy of bladder stones, EXCHANGE OF SP TUBE;  Surgeon: Arlin Tran MD;  [facility];  Service: Urology    SUPRAPUBIC CATHETER INSERTION            08/20/23 1030   PT Last Visit   PT Visit Date 08/20/23   Note Type   Note type Evaluation   Pain Assessment   Pain Assessment Tool 0-10   Pain Score No Pain   Restrictions/Precautions   Other Precautions Contact/isolation; Chair Alarm; Bed Alarm; Fall Risk;Telemetry;Multiple lines   Home Living   Type of [address] entrance;Stairs to enter without rails; One level;Performs ADLs on one level; Able to live on main level with bedroom/bathroom  (1 DAI)   Home Equipment Wheelchair-manual;Cane;Walker; Hospital bed  (sit to stand machine,)   Additional Comments pt lives with brother and sister who A with daily care, PTA pt needed A for ADLs,mobility and IADLs,was receiving A from [facility]   Prior Function   Level of [place] Needs assistance with ADLs; Needs assistance with functional mobility; Needs assistance with IADLS   Lives With Family  (brother and sister)   [place] Help From Family  (daily with care)   IADLs Family/Friend/Other provides medication management; Family/Friend/Other provides meals; Family/Friend/Other provides transportation   Falls in the last 6 months 0  (per pt)   General   Additional Pertinent History h/o MS, sepsis 2* UTI, h/o CVA, urosepsis in June 2023, SOB and (+)N   Family/Caregiver Present No   Cognition Overall Cognitive Status WFL   Arousal/Participation Cooperative   Orientation Level Oriented X4   Following Commands Follows one step commands with increased time or repetition   Subjective   Subjective Pt supine in bed resting comfortably; pt willing and agreeable to work with PT and to participate in therapy intervention; "I just feel so stiff, I'm glad I am getting out of bed". RLE Assessment   RLE Assessment   (very limited, (+) tone)   LLE Assessment   LLE Assessment   (very limited, (+)tone)   Coordination   Sensation WFL   Light Touch   RLE Light Touch Grossly intact   LLE Light Touch Grossly intact   Bed Mobility   Supine to Sit 2  Maximal assistance   Additional items Assist x 2;HOB elevated; Bedrails; Increased time required;Verbal cues;LE management   Transfers   Sit to Stand 4  Minimal assistance   Additional items Assist x 1;Bedrails; Increased time required;Verbal cues   Stand to Sit 4  Minimal assistance   Additional items Assist x 1; Armrests; Increased time required;Verbal cues   Additional Comments use of smooth  to A OOB->chair. Pt and pts family use sit->stand machine at home PTA for mobility from surface to surface   Ambulation/Elevation   Gait Assistance Not tested  (pt is nonambulatory)   Balance   Static Sitting Fair +   Dynamic Sitting Poor +   Activity Tolerance   Activity Tolerance   (fair)   Medical Staff Made Aware  [facility]   Nurse Made Aware yes   Assessment   Prognosis Fair   Assessment pt is a 76 yo male admitted to [facility] 2* sepsis due to UTI, h/o CVA and h/o MS, SOB and (+)N; pt was recently admitted 2* urosepsis in June 2023. Pt currently is at functional mobility baseline, use of smooth  for OOB to chair activity needing min Ax1 and bed mobility maxAx2. Recommend daily OOB with use of smooth  via non PT vs restorative therapy aide. Pt agreed. No further skilled inpt PT services needed at this time, DC pt from skilled inpt PT.  Recommend pt wound benefit from ROM exercises by restorative therapy aide daily, pt agreed. Pt reports family A with daily care PTA, use of manual wc to exit and enter the home with A from family dependently. Goals   Patient Goals to be less stiff   Short Term Goal #1 no goals at this time 2* DC pt from skilled inpt PT   PT Treatment Day 0   Plan   Treatment/Interventions OT; Spoke to nursing   Recommendation   PT Discharge Recommendation No rehabilitation needs  (cont A from family upon DC)   Equipment Recommended Wheelchair; Other (Comment)  (smooth , wc)   AM-PAC Basic Mobility Inpatient   Turning in Flat Bed Without Bedrails 1   Lying on Back to Sitting on Edge of Flat Bed Without Bedrails 1   Moving Bed to Chair 1   Standing Up From Chair Using Arms 3   Walk in Room 1   Climb 3-5 Stairs With Railing 1   Basic Mobility Inpatient Raw Score 8   Highest Level Of Mobility   JH-HLM Goal 3: Sit at edge of bed   JH-HLM Achieved 3: Sit at edge of bed           @Mary Huynh, PT, DPT@

## 2023-08-20 NOTE — PROCEDURES
Patient known to our service with chronic suprapubic tube. Seen yesterday but unfortunately 20 [place] suprapubic tube not available. Unfortunately, the only available supplies at current is a 20 [place] three-way Cage catheter. After removal of the patient's current indwelling suprapubic tube, the os was cleaned with Betadine. A new latex 25 [place] Cage catheter was advanced into the suprapubic region. 10 cc of sterile fluid were replaced into the balloon. Catheter was hooked to gravity drainage. The patient tolerated the procedure well. Urine cultures can be followed by the primary service and antibiotic therapy can be adjusted. Patient can continue his routine suprapubic tube exchanges every 4 to 6 weeks and follow-up with his primary urologist, Dr. Anupama Alaniz. Patient has some chronic leakage around the suprapubic catheter. I have added Ditropan to his regimen while hospitalized. Medication is an anticholinergic to reduce bladder spasms and would monitor for any constipation or mental status changes. In the outpatient setting, Myrbetriq could be offered which has less of the side effects but is not available on formulary.

## 2023-08-20 NOTE — PLAN OF CARE
Problem: Potential for Falls  Goal: Patient will remain free of falls  Description: INTERVENTIONS:  - Educate patient/family on patient safety including physical limitations  - Instruct patient to call for assistance with activity   - Consult OT/PT to assist with strengthening/mobility   - Keep Call bell within reach  - Keep bed low and locked with side rails adjusted as appropriate  - Keep care items and personal belongings within reach  - Initiate and maintain comfort rounds  - Make Fall Risk Sign visible to staff  - Offer Toileting every 2 Hours, in advance of need  - Initiate/Maintain bed alarm  - Obtain necessary fall risk management equipment  - Apply yellow socks and bracelet for high fall risk patients  - Consider moving patient to room near nurses station  Outcome: Progressing     Problem: MOBILITY - ADULT  Goal: Maintain or return to baseline ADL function  Description: INTERVENTIONS:  -  Assess patient's ability to carry out ADLs; assess patient's baseline for ADL function and identify physical deficits which impact ability to perform ADLs (bathing, care of mouth/teeth, toileting, grooming, dressing, etc.)  - Assess/evaluate cause of self-care deficits   - Assess range of motion  - Assess patient's mobility; develop plan if impaired  - Assess patient's need for assistive devices and provide as appropriate  - Encourage maximum independence but intervene and supervise when necessary  - Involve family in performance of ADLs  - Assess for home care needs following discharge   - Consider OT consult to assist with ADL evaluation and planning for discharge  - Provide patient education as appropriate  Outcome: Progressing  Goal: Maintains/Returns to pre admission functional level  Description: INTERVENTIONS:  - Perform BMAT or MOVE assessment daily.   - Set and communicate daily mobility goal to care team and patient/family/caregiver.    - Collaborate with rehabilitation services on mobility goals if consulted  - Perform Range of Motion 3 times a day. - Reposition patient every 2 hours.   - Dangle patient 3 times a day  - Stand patient 3 times a day  - Ambulate patient 3 times a day  - Out of bed to chair 3 times a day   - Out of bed for meals 3 times a day  - Out of bed for toileting  - Record patient progress and toleration of activity level   Outcome: Progressing     Problem: PAIN - ADULT  Goal: Verbalizes/displays adequate comfort level or baseline comfort level  Description: Interventions:  - Encourage patient to monitor pain and request assistance  - Assess pain using appropriate pain scale  - Administer analgesics based on type and severity of pain and evaluate response  - Implement non-pharmacological measures as appropriate and evaluate response  - Consider cultural and social influences on pain and pain management  - Notify physician/advanced practitioner if interventions unsuccessful or patient reports new pain  Outcome: Progressing     Problem: INFECTION - ADULT  Goal: Absence or prevention of progression during hospitalization  Description: INTERVENTIONS:  - Assess and monitor for signs and symptoms of infection  - Monitor lab/diagnostic results  - Monitor all insertion sites, i.e. indwelling lines, tubes, and drains  - Monitor endotracheal if appropriate and nasal secretions for changes in amount and color  - [place] appropriate cooling/warming therapies per order  - Administer medications as ordered  - Instruct and encourage patient and family to use good hand hygiene technique  - Identify and instruct in appropriate isolation precautions for identified infection/condition  Outcome: Progressing  Goal: Absence of fever/infection during neutropenic period  Description: INTERVENTIONS:  - Monitor WBC    Outcome: Progressing     Problem: SAFETY ADULT  Goal: Patient will remain free of falls  Description: INTERVENTIONS:  - Educate patient/family on patient safety including physical limitations  - Instruct patient to call for assistance with activity   - Consult OT/PT to assist with strengthening/mobility   - Keep Call bell within reach  - Keep bed low and locked with side rails adjusted as appropriate  - Keep care items and personal belongings within reach  - Initiate and maintain comfort rounds  - Make Fall Risk Sign visible to staff  - Offer Toileting every 2 Hours, in advance of need  - Initiate/Maintain bed alarm  - Obtain necessary fall risk management equipment  - Apply yellow socks and bracelet for high fall risk patients  - Consider moving patient to room near nurses station  Outcome: Progressing  Goal: Maintain or return to baseline ADL function  Description: INTERVENTIONS:  -  Assess patient's ability to carry out ADLs; assess patient's baseline for ADL function and identify physical deficits which impact ability to perform ADLs (bathing, care of mouth/teeth, toileting, grooming, dressing, etc.)  - Assess/evaluate cause of self-care deficits   - Assess range of motion  - Assess patient's mobility; develop plan if impaired  - Assess patient's need for assistive devices and provide as appropriate  - Encourage maximum independence but intervene and supervise when necessary  - Involve family in performance of ADLs  - Assess for home care needs following discharge   - Consider OT consult to assist with ADL evaluation and planning for discharge  - Provide patient education as appropriate  Outcome: Progressing  Goal: Maintains/Returns to pre admission functional level  Description: INTERVENTIONS:  - Perform BMAT or MOVE assessment daily.   - Set and communicate daily mobility goal to care team and patient/family/caregiver. - Collaborate with rehabilitation services on mobility goals if consulted  - Perform Range of Motion 3 times a day. - Reposition patient every 2 hours.   - Dangle patient 3 times a day  - Stand patient 3 times a day  - Ambulate patient 3 times a day  - Out of bed to chair 3 times a day   - Out of bed for meals 3 times a day  - Out of bed for toileting  - Record patient progress and toleration of activity level   Outcome: Progressing     Problem: Knowledge Deficit  Goal: Patient/family/caregiver demonstrates understanding of disease process, treatment plan, medications, and discharge instructions  Description: Complete learning assessment and assess knowledge base.   Interventions:  - Provide teaching at level of understanding  - Provide teaching via preferred learning methods  Outcome: Progressing     Problem: Prexisting or High Potential for Compromised Skin Integrity  Goal: Skin integrity is maintained or improved  Description: INTERVENTIONS:  - Identify patients at risk for skin breakdown  - Assess and monitor skin integrity  - Assess and monitor nutrition and hydration status  - Monitor labs   - Assess for incontinence   - Turn and reposition patient  - Assist with mobility/ambulation  - Relieve pressure over bony prominences  - Avoid friction and shearing  - Provide appropriate hygiene as needed including keeping skin clean and dry  - Evaluate need for skin moisturizer/barrier cream  - Collaborate with interdisciplinary team   - Patient/family teaching  - Consider wound care consult   Outcome: Progressing

## 2023-08-21 LAB
ANION GAP SERPL CALCULATED.3IONS-SCNC: 8 MMOL/L
BACTERIA UR CULT: ABNORMAL
BASOPHILS # BLD MANUAL: 0.28 THOUSAND/UL (ref 0–0.1)
BASOPHILS NFR MAR MANUAL: 2 % (ref 0–1)
BUN SERPL-MCNC: 14 MG/DL (ref 5–25)
CALCIUM SERPL-MCNC: 9.2 MG/DL (ref 8.4–10.2)
CHLORIDE SERPL-SCNC: 101 MMOL/L (ref 96–108)
CO2 SERPL-SCNC: 23 MMOL/L (ref 21–32)
CREAT SERPL-MCNC: 0.86 MG/DL (ref 0.6–1.3)
EOSINOPHIL # BLD MANUAL: 0.69 THOUSAND/UL (ref 0–0.4)
EOSINOPHIL NFR BLD MANUAL: 5 % (ref 0–6)
ERYTHROCYTE [DISTWIDTH] IN BLOOD BY AUTOMATED COUNT: 12.6 % (ref 11.6–15.1)
GFR SERPL CREATININE-BSD FRML MDRD: 85 ML/MIN/1.73SQ M
GLUCOSE SERPL-MCNC: 170 MG/DL (ref 65–140)
GLUCOSE SERPL-MCNC: 176 MG/DL (ref 65–140)
GLUCOSE SERPL-MCNC: 185 MG/DL (ref 65–140)
GLUCOSE SERPL-MCNC: 243 MG/DL (ref 65–140)
GLUCOSE SERPL-MCNC: 257 MG/DL (ref 65–140)
HCT VFR BLD AUTO: 36.4 % (ref 36.5–49.3)
HGB BLD-MCNC: 11.7 G/DL (ref 12–17)
LYMPHOCYTES # BLD AUTO: 37 % (ref 14–44)
LYMPHOCYTES # BLD AUTO: 5.39 THOUSAND/UL (ref 0.6–4.47)
MCH RBC QN AUTO: 28.7 PG (ref 26.8–34.3)
MCHC RBC AUTO-ENTMCNC: 32.1 G/DL (ref 31.4–37.4)
MCV RBC AUTO: 89 FL (ref 82–98)
MONOCYTES # BLD AUTO: 0.69 THOUSAND/UL (ref 0–1.22)
MONOCYTES NFR BLD: 5 % (ref 4–12)
MYELOCYTES NFR BLD MANUAL: 1 % (ref 0–1)
NEUTROPHILS # BLD MANUAL: 6.64 THOUSAND/UL (ref 1.85–7.62)
NEUTS SEG NFR BLD AUTO: 48 % (ref 43–75)
PLATELET # BLD AUTO: 410 THOUSANDS/UL (ref 149–390)
PLATELET BLD QL SMEAR: ABNORMAL
PMV BLD AUTO: 8 FL (ref 8.9–12.7)
POTASSIUM SERPL-SCNC: 4.1 MMOL/L (ref 3.5–5.3)
RBC # BLD AUTO: 4.07 MILLION/UL (ref 3.88–5.62)
RBC MORPH BLD: NORMAL
SODIUM SERPL-SCNC: 132 MMOL/L (ref 135–147)
VARIANT LYMPHS # BLD AUTO: 2 %
WBC # BLD AUTO: 13.83 THOUSAND/UL (ref 4.31–10.16)

## 2023-08-21 PROCEDURE — 80048 BASIC METABOLIC PNL TOTAL CA: CPT | Performed by: INTERNAL MEDICINE

## 2023-08-21 PROCEDURE — 82948 REAGENT STRIP/BLOOD GLUCOSE: CPT

## 2023-08-21 PROCEDURE — 85027 COMPLETE CBC AUTOMATED: CPT | Performed by: INTERNAL MEDICINE

## 2023-08-21 PROCEDURE — 99232 SBSQ HOSP IP/OBS MODERATE 35: CPT | Performed by: INTERNAL MEDICINE

## 2023-08-21 PROCEDURE — 85007 BL SMEAR W/DIFF WBC COUNT: CPT | Performed by: INTERNAL MEDICINE

## 2023-08-21 RX ADMIN — GABAPENTIN 600 MG: 300 CAPSULE ORAL at 20:45

## 2023-08-21 RX ADMIN — INSULIN LISPRO 1 UNITS: 100 INJECTION, SOLUTION INTRAVENOUS; SUBCUTANEOUS at 08:59

## 2023-08-21 RX ADMIN — INSULIN LISPRO 3 UNITS: 100 INJECTION, SOLUTION INTRAVENOUS; SUBCUTANEOUS at 12:10

## 2023-08-21 RX ADMIN — AMPICILLIN SODIUM 1000 MG: 1 INJECTION, POWDER, FOR SOLUTION INTRAMUSCULAR; INTRAVENOUS at 20:45

## 2023-08-21 RX ADMIN — INSULIN LISPRO 3 UNITS: 100 INJECTION, SOLUTION INTRAVENOUS; SUBCUTANEOUS at 20:54

## 2023-08-21 RX ADMIN — ATORVASTATIN CALCIUM 80 MG: 80 TABLET, FILM COATED ORAL at 08:59

## 2023-08-21 RX ADMIN — Medication 6 MG: at 20:45

## 2023-08-21 RX ADMIN — INSULIN LISPRO 1 UNITS: 100 INJECTION, SOLUTION INTRAVENOUS; SUBCUTANEOUS at 16:37

## 2023-08-21 RX ADMIN — CLOPIDOGREL BISULFATE 75 MG: 75 TABLET ORAL at 08:59

## 2023-08-21 RX ADMIN — AMPICILLIN SODIUM 1000 MG: 1 INJECTION, POWDER, FOR SOLUTION INTRAMUSCULAR; INTRAVENOUS at 02:18

## 2023-08-21 RX ADMIN — AMPICILLIN SODIUM 1000 MG: 1 INJECTION, POWDER, FOR SOLUTION INTRAMUSCULAR; INTRAVENOUS at 08:59

## 2023-08-21 RX ADMIN — AMPICILLIN SODIUM 1000 MG: 1 INJECTION, POWDER, FOR SOLUTION INTRAMUSCULAR; INTRAVENOUS at 14:55

## 2023-08-21 RX ADMIN — PANTOPRAZOLE SODIUM 40 MG: 40 TABLET, DELAYED RELEASE ORAL at 05:24

## 2023-08-21 RX ADMIN — GABAPENTIN 300 MG: 300 CAPSULE ORAL at 16:37

## 2023-08-21 RX ADMIN — SERTRALINE HYDROCHLORIDE 25 MG: 25 TABLET ORAL at 20:45

## 2023-08-21 RX ADMIN — GABAPENTIN 300 MG: 300 CAPSULE ORAL at 08:59

## 2023-08-21 RX ADMIN — SENNOSIDES AND DOCUSATE SODIUM 2 TABLET: 50; 8.6 TABLET ORAL at 20:45

## 2023-08-21 RX ADMIN — OXYBUTYNIN CHLORIDE 5 MG: 5 TABLET, EXTENDED RELEASE ORAL at 08:59

## 2023-08-21 RX ADMIN — ENOXAPARIN SODIUM 40 MG: 100 INJECTION SUBCUTANEOUS at 08:59

## 2023-08-21 RX ADMIN — LATANOPROST 1 DROP: 50 SOLUTION OPHTHALMIC at 20:49

## 2023-08-21 RX ADMIN — CEFTRIAXONE 1000 MG: 1 INJECTION, SOLUTION INTRAVENOUS at 09:51

## 2023-08-21 RX ADMIN — PROPRANOLOL HYDROCHLORIDE 60 MG: 60 CAPSULE, EXTENDED RELEASE ORAL at 08:59

## 2023-08-21 RX ADMIN — AMLODIPINE BESYLATE 10 MG: 10 TABLET ORAL at 08:59

## 2023-08-21 NOTE — PROGRESS NOTES
[facility]  Progress Note  Name: Monae Parrish I  MRN: [id number]  Unit/Bed#: E4 -01 I Date of Admission: 8/18/2023   Date of Service: 8/21/2023 I Hospital Day: 3    Assessment/Plan   * Sepsis due to urinary tract infection [facility])  Assessment & Plan  Patient is a 29-year-old male with past medical history significant for multiple sclerosis, urinary retention with chronic suprapubic catheter and hypertension who presented to the ER with fever and symptoms that he states are identical to his previous urinary tract infections     · Diagnosed with sepsis, POA, with leukocytosis and tachycardia  · No lactic acidosis  · CT skin of the abdomen and pelvis revealed bilateral pyelonephritis   · Urine cultures and blood cultures pending  · continue empiric antibiotics  · Patient with recent admission 6/23 with sepsis secondary to obstructed and infected suprapubic catheter with cultures growing Enterococcus  · Continue IV Rocephin and ampicillin as previously recommended by infectious disease on prior admission  · Blood cultures negative thus far  · Follow-up urine cultures      Pyelonephritis  Assessment & Plan  · Patient presented with criteria for sepsis   · urinalysis with negative nitrates, large leukocyte Estrace, and innumerable white blood cells  · Initially this was attributed to colonization, due to patient's chronic suprapubic catheter, rather than true infection  · However CT scan revealed bilateral right greater than left pyelonephritis and cystitis  · Urine cultures pending  · Likely secondary to pyelonephritis, secondary to chronic suprapubic catheter  · Continue antibiotics while cultures are pending    Hyponatremia  Assessment & Plan  · Per old records, patient has chronic hyponatremia with a baseline sodium ranging approximately 129-134  · Current sodium at his baseline  · Possibly medication related    History of CVA (cerebrovascular accident)  Assessment & Plan  · History of CVA in 2018  · On Plavix and statin    Type 2 diabetes mellitus with hyperglycemia, without long-term current use of insulin (HCC)  Assessment & Plan  · Adequate control inpatient  · Trulicity on hold  · Continue sliding scale    Multiple sclerosis (HCC)  Assessment & Plan  · Longstanding history of MS since 1960's, not maintained on DMD.  Wheelchair-bound  · Follows outpatient [facility]    Primary hypertension  Assessment & Plan  · Continue home regimen with propranolol LA 60 mg daily and Norvasc 10 mg daily  · Blood pressure adequately controlled. 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 Education and Discussions with Family / Patient: Patient    Time Spent for Care: More than 50% of total time spent on counseling and coordination of care as described above.     Current Length of Stay: 3 day(s)    Current Patient Status: Inpatient   Certification Statement: The patient will continue to require additional inpatient hospital stay due to Awaiting urine culture    Discharge Plan / Estimated Discharge Date: 24h    Code Status: Level 1 - Full Code      Subjective:   Patient seen and examined at bedside, comfortable, denies any abdominal pain, nausea, vomiting. Objective:     Vitals:   Temp (24hrs), Av.3 °F (36.3 °C), Min:97 °F (36.1 °C), Max:97.7 °F (36.5 °C)    Temp:  [97 °F (36.1 °C)-97.7 °F (36.5 °C)] 97 °F (36.1 °C)  HR:  [60-70] 60  Resp:  [18] 18  BP: (117-143)/(68-71) 117/71  SpO2:  [94 %-98 %] 98 %  Body mass index is 27.36 kg/m². Input and Output Summary (last 24 hours): Intake/Output Summary (Last 24 hours) at 2023 1813  Last data filed at 2023 0601  Gross per 24 hour   Intake 100 ml   Output 1550 ml   Net -1450 ml       Physical Exam:    Constitutional: Patient is oriented to person, place and time, no acute distress  HEENT:  Normocephalic, atraumatic  Cardiovascular: Normal S1S2, RRR, No murmurs/rubs/gallops appreciated. Pulmonary:  Bilateral air entry, No rhonchi/rales/wheezing appreciated  Abdominal: Soft, Bowel sounds present, Non-tender, Non-distended  Extremities:  No cyanosis, clubbing or edema. Neurological: awake, Alert  Skin:  Warm, dry    Additional Data:     Labs:    Results from last 7 days   Lab Units 23  0556 23  0502   WBC Thousand/uL 13.83* 14.94*   HEMOGLOBIN g/dL 11.7* 12.8   HEMATOCRIT % 36.4* 38.2   PLATELETS Thousands/uL 410* 399*   NEUTROS PCT %  --  62   LYMPHS PCT %  --  25   LYMPHO PCT % 37  --    MONOS PCT %  --  8   MONO PCT % 5  --    EOS PCT % 5 3     Results from last 7 days   Lab Units 23  0556 23  0534 23  2105   POTASSIUM mmol/L 4.1   < > 4.1   CHLORIDE mmol/L 101   < > 95*   CO2 mmol/L 23   < > 27   BUN mg/dL 14   < > 9   CREATININE mg/dL 0.86   < > 0.86   CALCIUM mg/dL 9.2   < > 10.1   ALK PHOS U/L  --   --  105*   ALT U/L  --   --  12   AST U/L  --   --  11*    < > = values in this interval not displayed. Results from last 7 days   Lab Units 23  2105   INR  0.96        I Have Reviewed All Lab Data Listed Above.     Invasive Devices     Peripheral Intravenous Line  Duration           Peripheral IV 23 Distal;Left;Upper;Ventral (anterior) Arm 2 days          Drain  Duration           Suprapubic Catheter 20 Fr. 57 days                     Recent Cultures (last 7 days):     Results from last 7 days   Lab Units 08/18/23  2216 08/18/23  2150 08/18/23  2142   BLOOD CULTURE   --  No Growth at 48 hrs. No Growth at 48 hrs.    URINE CULTURE  60,000-69,000 cfu/ml Beta Hemolytic Streptococcus Group B*  >100,000 cfu/ml Aerococcus urinae*  60,000-69,000 cfu/ml Staphylococcus epidermidis*  20,000-29,000 cfu/ml Acinetobacter lwoffii*  --   --        Last 24 Hours Medication List:   Current Facility-Administered Medications   Medication Dose Route Frequency Provider Last Rate   • acetaminophen  975 mg Oral Q6H PRN SHA Bueno     • albuterol  2 puff Inhalation Q6H PRN SHA Bueno     • ALPRAZolam  0.25 mg Oral BID PRN SHA Bueno     • aluminum-magnesium hydroxide-simethicone  30 mL Oral Q6H PRN SHA Bueno     • amLODIPine  10 mg Oral Daily SHA Bueno      And   • atorvastatin  80 mg Oral Daily SHA Bueno     • ampicillin  1,000 mg Intravenous Q6H SHA Bueno 1,000 mg (08/21/23 1455)   • cefTRIAXone  1,000 mg Intravenous Q24H SHA Bueno 1,000 mg (08/21/23 0951)   • clopidogrel  75 mg Oral Daily SHA Bueno     • diphenhydrAMINE  25 mg Oral Q6H PRN SHA Bueno     • enoxaparin  40 mg Subcutaneous Daily SHA Bueno     • gabapentin  300 mg Oral BID SHA Bueno     • gabapentin  600 mg Oral HS SHA Bueno     • insulin lispro  1-6 Units Subcutaneous 4x Daily (AC & HS) SHA Bueno     • latanoprost  1 drop Both Eyes HS SHA Bueno     • melatonin  6 mg Oral HS SHA Bueno     • ondansetron  4 mg Intravenous Q6H PRN SHA Bueno     • oxybutynin  5 mg Oral Daily Li Linares MD     • oxyCODONE  5 mg Oral 4x Daily PRN SHA Bueno • oxyCODONE  2.5 mg Oral Q6H PRN SHA Machado     • pantoprazole  40 mg Oral Early Morning SHA Machado     • polyethylene glycol  17 g Oral Daily PRN SHA Machado     • propranolol  60 mg Oral Daily SHA Machado     • senna-docusate sodium  2 tablet Oral HS SHA Machado     • sertraline  25 mg Oral HS SHA Machado     • simethicone  80 mg Oral 4x Daily PRN SHA Machado          Today, Patient Was Seen By: Shanelle Prince MD weakness and deconditioning

## 2023-08-21 NOTE — ASSESSMENT & PLAN NOTE
· Per old records, patient has chronic hyponatremia with a baseline sodium ranging approximately 129-134  · Current sodium at his baseline  · Possibly medication related

## 2023-08-21 NOTE — PLAN OF CARE
Problem: Potential for Falls  Goal: Patient will remain free of falls  Description: INTERVENTIONS:  - Educate patient/family on patient safety including physical limitations  - Instruct patient to call for assistance with activity   - Consult OT/PT to assist with strengthening/mobility   - Keep Call bell within reach  - Keep bed low and locked with side rails adjusted as appropriate  - Keep care items and personal belongings within reach  - Initiate and maintain comfort rounds  - Make Fall Risk Sign visible to staff  - Offer Toileting every  Hours, in advance of need  - Initiate/Maintain alarm  - Obtain necessary fall risk management equipment:   - Apply yellow socks and bracelet for high fall risk patients  - Consider moving patient to room near nurses station  Outcome: Progressing     Problem: MOBILITY - ADULT  Goal: Maintain or return to baseline ADL function  Description: INTERVENTIONS:  -  Assess patient's ability to carry out ADLs; assess patient's baseline for ADL function and identify physical deficits which impact ability to perform ADLs (bathing, care of mouth/teeth, toileting, grooming, dressing, etc.)  - Assess/evaluate cause of self-care deficits   - Assess range of motion  - Assess patient's mobility; develop plan if impaired  - Assess patient's need for assistive devices and provide as appropriate  - Encourage maximum independence but intervene and supervise when necessary  - Involve family in performance of ADLs  - Assess for home care needs following discharge   - Consider OT consult to assist with ADL evaluation and planning for discharge  - Provide patient education as appropriate  Outcome: Progressing  Goal: Maintains/Returns to pre admission functional level  Description: INTERVENTIONS:  - Perform BMAT or MOVE assessment daily.   - Set and communicate daily mobility goal to care team and patient/family/caregiver.    - Collaborate with rehabilitation services on mobility goals if consulted  - Perform Range of Motion  times a day. - Reposition patient every  hours.   - Dangle patient  times a day  - Stand patient  times a day  - Ambulate patient  times a day  - Out of bed to chair  times a day   - Out of bed for meals  times a day  - Out of bed for toileting  - Record patient progress and toleration of activity level   Outcome: Progressing     Problem: PAIN - ADULT  Goal: Verbalizes/displays adequate comfort level or baseline comfort level  Description: Interventions:  - Encourage patient to monitor pain and request assistance  - Assess pain using appropriate pain scale  - Administer analgesics based on type and severity of pain and evaluate response  - Implement non-pharmacological measures as appropriate and evaluate response  - Consider cultural and social influences on pain and pain management  - Notify physician/advanced practitioner if interventions unsuccessful or patient reports new pain  Outcome: Progressing     Problem: INFECTION - ADULT  Goal: Absence or prevention of progression during hospitalization  Description: INTERVENTIONS:  - Assess and monitor for signs and symptoms of infection  - Monitor lab/diagnostic results  - Monitor all insertion sites, i.e. indwelling lines, tubes, and drains  - Monitor endotracheal if appropriate and nasal secretions for changes in amount and color  - [place] appropriate cooling/warming therapies per order  - Administer medications as ordered  - Instruct and encourage patient and family to use good hand hygiene technique  - Identify and instruct in appropriate isolation precautions for identified infection/condition  Outcome: Progressing  Goal: Absence of fever/infection during neutropenic period  Description: INTERVENTIONS:  - Monitor WBC    Outcome: Progressing     Problem: SAFETY ADULT  Goal: Patient will remain free of falls  Description: INTERVENTIONS:  - Educate patient/family on patient safety including physical limitations  - Instruct patient to call for assistance with activity   - Consult OT/PT to assist with strengthening/mobility   - Keep Call bell within reach  - Keep bed low and locked with side rails adjusted as appropriate  - Keep care items and personal belongings within reach  - Initiate and maintain comfort rounds  - Make Fall Risk Sign visible to staff  - Offer Toileting every  Hours, in advance of need  - Initiate/Maintain alarm  - Obtain necessary fall risk management equipment:   - Apply yellow socks and bracelet for high fall risk patients  - Consider moving patient to room near nurses station  Outcome: Progressing  Goal: Maintain or return to baseline ADL function  Description: INTERVENTIONS:  -  Assess patient's ability to carry out ADLs; assess patient's baseline for ADL function and identify physical deficits which impact ability to perform ADLs (bathing, care of mouth/teeth, toileting, grooming, dressing, etc.)  - Assess/evaluate cause of self-care deficits   - Assess range of motion  - Assess patient's mobility; develop plan if impaired  - Assess patient's need for assistive devices and provide as appropriate  - Encourage maximum independence but intervene and supervise when necessary  - Involve family in performance of ADLs  - Assess for home care needs following discharge   - Consider OT consult to assist with ADL evaluation and planning for discharge  - Provide patient education as appropriate  Outcome: Progressing  Goal: Maintains/Returns to pre admission functional level  Description: INTERVENTIONS:  - Perform BMAT or MOVE assessment daily.   - Set and communicate daily mobility goal to care team and patient/family/caregiver. - Collaborate with rehabilitation services on mobility goals if consulted  - Perform Range of Motion  times a day. - Reposition patient every  hours.   - Dangle patient  times a day  - Stand patient  times a day  - Ambulate patient  times a day  - Out of bed to chair  times a day   - Out of bed for meals times a day  - Out of bed for toileting  - Record patient progress and toleration of activity level   Outcome: Progressing     Problem: Knowledge Deficit  Goal: Patient/family/caregiver demonstrates understanding of disease process, treatment plan, medications, and discharge instructions  Description: Complete learning assessment and assess knowledge base.   Interventions:  - Provide teaching at level of understanding  - Provide teaching via preferred learning methods  Outcome: Progressing     Problem: Prexisting or High Potential for Compromised Skin Integrity  Goal: Skin integrity is maintained or improved  Description: INTERVENTIONS:  - Identify patients at risk for skin breakdown  - Assess and monitor skin integrity  - Assess and monitor nutrition and hydration status  - Monitor labs   - Assess for incontinence   - Turn and reposition patient  - Assist with mobility/ambulation  - Relieve pressure over bony prominences  - Avoid friction and shearing  - Provide appropriate hygiene as needed including keeping skin clean and dry  - Evaluate need for skin moisturizer/barrier cream  - Collaborate with interdisciplinary team   - Patient/family teaching  - Consider wound care consult   Outcome: Progressing

## 2023-08-21 NOTE — ASSESSMENT & PLAN NOTE
Patient is a 78-year-old male with past medical history significant for multiple sclerosis, urinary retention with chronic suprapubic catheter and hypertension who presented to the ER with fever and symptoms that he states are identical to his previous urinary tract infections     · Diagnosed with sepsis, POA, with leukocytosis and tachycardia  · No lactic acidosis  · CT skin of the abdomen and pelvis revealed bilateral pyelonephritis   · Urine cultures and blood cultures pending  · continue empiric antibiotics  · Patient with recent admission 6/23 with sepsis secondary to obstructed and infected suprapubic catheter with cultures growing Enterococcus  · Continue IV Rocephin and ampicillin as previously recommended by infectious disease on prior admission  · Blood cultures negative thus far  · Follow-up urine cultures

## 2023-08-21 NOTE — PLAN OF CARE
Problem: Potential for Falls  Goal: Patient will remain free of falls  Description: INTERVENTIONS:  - Educate patient/family on patient safety including physical limitations  - Instruct patient to call for assistance with activity   - Consult OT/PT to assist with strengthening/mobility   - Keep Call bell within reach  - Keep bed low and locked with side rails adjusted as appropriate  - Keep care items and personal belongings within reach  - Initiate and maintain comfort rounds  - Make Fall Risk Sign visible to staff  - Offer Toileting every 2 Hours, in advance of need  - Initiate/Maintain bed alarm  - Obtain necessary fall risk management equipment: bed alarm  - Apply yellow socks and bracelet for high fall risk patients  - Consider moving patient to room near nurses station  Outcome: Progressing     Problem: MOBILITY - ADULT  Goal: Maintain or return to baseline ADL function  Description: INTERVENTIONS:  -  Assess patient's ability to carry out ADLs; assess patient's baseline for ADL function and identify physical deficits which impact ability to perform ADLs (bathing, care of mouth/teeth, toileting, grooming, dressing, etc.)  - Assess/evaluate cause of self-care deficits   - Assess range of motion  - Assess patient's mobility; develop plan if impaired  - Assess patient's need for assistive devices and provide as appropriate  - Encourage maximum independence but intervene and supervise when necessary  - Involve family in performance of ADLs  - Assess for home care needs following discharge   - Consider OT consult to assist with ADL evaluation and planning for discharge  - Provide patient education as appropriate  Outcome: Progressing  Goal: Maintains/Returns to pre admission functional level  Description: INTERVENTIONS:  - Perform BMAT or MOVE assessment daily.   - Set and communicate daily mobility goal to care team and patient/family/caregiver.    - Collaborate with rehabilitation services on mobility goals if consulted  - Perform Range of Motion 3 times a day. - Reposition patient every 2 hours.   - Dangle patient 3 times a day  - Stand patient 3 times a day  - Ambulate patient 3 times a day  - Out of bed to chair 3 times a day   - Out of bed for meals 3 times a day  - Out of bed for toileting  - Record patient progress and toleration of activity level   Outcome: Progressing     Problem: PAIN - ADULT  Goal: Verbalizes/displays adequate comfort level or baseline comfort level  Description: Interventions:  - Encourage patient to monitor pain and request assistance  - Assess pain using appropriate pain scale  - Administer analgesics based on type and severity of pain and evaluate response  - Implement non-pharmacological measures as appropriate and evaluate response  - Consider cultural and social influences on pain and pain management  - Notify physician/advanced practitioner if interventions unsuccessful or patient reports new pain  Outcome: Progressing     Problem: INFECTION - ADULT  Goal: Absence or prevention of progression during hospitalization  Description: INTERVENTIONS:  - Assess and monitor for signs and symptoms of infection  - Monitor lab/diagnostic results  - Monitor all insertion sites, i.e. indwelling lines, tubes, and drains  - Monitor endotracheal if appropriate and nasal secretions for changes in amount and color  - [place] appropriate cooling/warming therapies per order  - Administer medications as ordered  - Instruct and encourage patient and family to use good hand hygiene technique  - Identify and instruct in appropriate isolation precautions for identified infection/condition  Outcome: Progressing  Goal: Absence of fever/infection during neutropenic period  Description: INTERVENTIONS:  - Monitor WBC    Outcome: Progressing     Problem: SAFETY ADULT  Goal: Patient will remain free of falls  Description: INTERVENTIONS:  - Educate patient/family on patient safety including physical limitations  - Instruct patient to call for assistance with activity   - Consult OT/PT to assist with strengthening/mobility   - Keep Call bell within reach  - Keep bed low and locked with side rails adjusted as appropriate  - Keep care items and personal belongings within reach  - Initiate and maintain comfort rounds  - Make Fall Risk Sign visible to staff  - Offer Toileting every 2 Hours, in advance of need  - Initiate/Maintain bed alarm  - Obtain necessary fall risk management equipment: bed alarm  - Apply yellow socks and bracelet for high fall risk patients  - Consider moving patient to room near nurses station  Outcome: Progressing  Goal: Maintain or return to baseline ADL function  Description: INTERVENTIONS:  -  Assess patient's ability to carry out ADLs; assess patient's baseline for ADL function and identify physical deficits which impact ability to perform ADLs (bathing, care of mouth/teeth, toileting, grooming, dressing, etc.)  - Assess/evaluate cause of self-care deficits   - Assess range of motion  - Assess patient's mobility; develop plan if impaired  - Assess patient's need for assistive devices and provide as appropriate  - Encourage maximum independence but intervene and supervise when necessary  - Involve family in performance of ADLs  - Assess for home care needs following discharge   - Consider OT consult to assist with ADL evaluation and planning for discharge  - Provide patient education as appropriate  Outcome: Progressing  Goal: Maintains/Returns to pre admission functional level  Description: INTERVENTIONS:  - Perform BMAT or MOVE assessment daily.   - Set and communicate daily mobility goal to care team and patient/family/caregiver. - Collaborate with rehabilitation services on mobility goals if consulted  - Perform Range of Motion 3 times a day. - Reposition patient every 2 hours.   - Dangle patient 3 times a day  - Stand patient 3 times a day  - Ambulate patient 3 times a day  - Out of bed to chair 3 times a day   - Out of bed for meals 3 times a day  - Out of bed for toileting  - Record patient progress and toleration of activity level   Outcome: Progressing     Problem: Knowledge Deficit  Goal: Patient/family/caregiver demonstrates understanding of disease process, treatment plan, medications, and discharge instructions  Description: Complete learning assessment and assess knowledge base.   Interventions:  - Provide teaching at level of understanding  - Provide teaching via preferred learning methods  Outcome: Progressing     Problem: Prexisting or High Potential for Compromised Skin Integrity  Goal: Skin integrity is maintained or improved  Description: INTERVENTIONS:  - Identify patients at risk for skin breakdown  - Assess and monitor skin integrity  - Assess and monitor nutrition and hydration status  - Monitor labs   - Assess for incontinence   - Turn and reposition patient  - Assist with mobility/ambulation  - Relieve pressure over bony prominences  - Avoid friction and shearing  - Provide appropriate hygiene as needed including keeping skin clean and dry  - Evaluate need for skin moisturizer/barrier cream  - Collaborate with interdisciplinary team   - Patient/family teaching  - Consider wound care consult   Outcome: Progressing

## 2023-08-21 NOTE — PLAN OF CARE
Problem: Potential for Falls  Goal: Patient will remain free of falls  Description: INTERVENTIONS:  - Educate patient/family on patient safety including physical limitations  - Instruct patient to call for assistance with activity   - Consult OT/PT to assist with strengthening/mobility   - Keep Call bell within reach  - Keep bed low and locked with side rails adjusted as appropriate  - Keep care items and personal belongings within reach  - Initiate and maintain comfort rounds  - Make Fall Risk Sign visible to staff  - Offer Toileting every 2 Hours, in advance of need  - Initiate/Maintain bed alarm  - Obtain necessary fall risk management equipment: bed alarm  - Apply yellow socks and bracelet for high fall risk patients  - Consider moving patient to room near nurses station  Outcome: Progressing     Problem: MOBILITY - ADULT  Goal: Maintain or return to baseline ADL function  Description: INTERVENTIONS:  -  Assess patient's ability to carry out ADLs; assess patient's baseline for ADL function and identify physical deficits which impact ability to perform ADLs (bathing, care of mouth/teeth, toileting, grooming, dressing, etc.)  - Assess/evaluate cause of self-care deficits   - Assess range of motion  - Assess patient's mobility; develop plan if impaired  - Assess patient's need for assistive devices and provide as appropriate  - Encourage maximum independence but intervene and supervise when necessary  - Involve family in performance of ADLs  - Assess for home care needs following discharge   - Consider OT consult to assist with ADL evaluation and planning for discharge  - Provide patient education as appropriate  Outcome: Progressing  Goal: Maintains/Returns to pre admission functional level  Description: INTERVENTIONS:  - Perform BMAT or MOVE assessment daily.   - Set and communicate daily mobility goal to care team and patient/family/caregiver.    - Collaborate with rehabilitation services on mobility goals if consulted  - Perform Range of Motion 3 times a day.   - Dangle patient 3 times a day  - Stand patient 3 times a day  - Ambulate patient 3 times a day  - Out of bed to chair 3 times a day   - Out of bed for meals 3 times a day  - Out of bed for toileting  - Record patient progress and toleration of activity level   Outcome: Progressing     Problem: PAIN - ADULT  Goal: Verbalizes/displays adequate comfort level or baseline comfort level  Description: Interventions:  - Encourage patient to monitor pain and request assistance  - Assess pain using appropriate pain scale  - Administer analgesics based on type and severity of pain and evaluate response  - Implement non-pharmacological measures as appropriate and evaluate response  - Consider cultural and social influences on pain and pain management  - Notify physician/advanced practitioner if interventions unsuccessful or patient reports new pain  Outcome: Progressing     Problem: INFECTION - ADULT  Goal: Absence or prevention of progression during hospitalization  Description: INTERVENTIONS:  - Assess and monitor for signs and symptoms of infection  - Monitor lab/diagnostic results  - Monitor all insertion sites, i.e. indwelling lines, tubes, and drains  - Monitor endotracheal if appropriate and nasal secretions for changes in amount and color  - [place] appropriate cooling/warming therapies per order  - Administer medications as ordered  - Instruct and encourage patient and family to use good hand hygiene technique  - Identify and instruct in appropriate isolation precautions for identified infection/condition  Outcome: Progressing  Goal: Absence of fever/infection during neutropenic period  Description: INTERVENTIONS:  - Monitor WBC    Outcome: Progressing     Problem: SAFETY ADULT  Goal: Patient will remain free of falls  Description: INTERVENTIONS:  - Educate patient/family on patient safety including physical limitations  - Instruct patient to call for assistance with activity   - Consult OT/PT to assist with strengthening/mobility   - Keep Call bell within reach  - Keep bed low and locked with side rails adjusted as appropriate  - Keep care items and personal belongings within reach  - Initiate and maintain comfort rounds  - Make Fall Risk Sign visible to staff  - Offer Toileting every 2 Hours, in advance of need  - Initiate/Maintain bed alarm  - Obtain necessary fall risk management equipment: bed alarm  - Apply yellow socks and bracelet for high fall risk patients  - Consider moving patient to room near nurses station  Outcome: Progressing  Goal: Maintain or return to baseline ADL function  Description: INTERVENTIONS:  -  Assess patient's ability to carry out ADLs; assess patient's baseline for ADL function and identify physical deficits which impact ability to perform ADLs (bathing, care of mouth/teeth, toileting, grooming, dressing, etc.)  - Assess/evaluate cause of self-care deficits   - Assess range of motion  - Assess patient's mobility; develop plan if impaired  - Assess patient's need for assistive devices and provide as appropriate  - Encourage maximum independence but intervene and supervise when necessary  - Involve family in performance of ADLs  - Assess for home care needs following discharge   - Consider OT consult to assist with ADL evaluation and planning for discharge  - Provide patient education as appropriate  Outcome: Progressing  Goal: Maintains/Returns to pre admission functional level  Description: INTERVENTIONS:  - Perform BMAT or MOVE assessment daily.   - Set and communicate daily mobility goal to care team and patient/family/caregiver. - Collaborate with rehabilitation services on mobility goals if consulted  - Perform Range of Motion 3 times a day.   - Dangle patient 3 times a day  - Stand patient 3 times a day  - Ambulate patient 3 times a day  - Out of bed to chair 3 times a day   - Out of bed for meals 3 times a day  - Out of bed for toileting  - Record patient progress and toleration of activity level   Outcome: Progressing     Problem: Knowledge Deficit  Goal: Patient/family/caregiver demonstrates understanding of disease process, treatment plan, medications, and discharge instructions  Description: Complete learning assessment and assess knowledge base.   Interventions:  - Provide teaching at level of understanding  - Provide teaching via preferred learning methods  Outcome: Progressing     Problem: Prexisting or High Potential for Compromised Skin Integrity  Goal: Skin integrity is maintained or improved  Description: INTERVENTIONS:  - Identify patients at risk for skin breakdown  - Assess and monitor skin integrity  - Assess and monitor nutrition and hydration status  - Monitor labs   - Assess for incontinence   - Turn and reposition patient  - Assist with mobility/ambulation  - Relieve pressure over bony prominences  - Avoid friction and shearing  - Provide appropriate hygiene as needed including keeping skin clean and dry  - Evaluate need for skin moisturizer/barrier cream  - Collaborate with interdisciplinary team   - Patient/family teaching  - Consider wound care consult   Outcome: Progressing

## 2023-08-22 VITALS
TEMPERATURE: 97.7 F | RESPIRATION RATE: 20 BRPM | WEIGHT: 159.39 LBS | BODY MASS INDEX: 27.21 KG/M2 | DIASTOLIC BLOOD PRESSURE: 76 MMHG | OXYGEN SATURATION: 95 % | HEART RATE: 61 BPM | HEIGHT: 64 IN | SYSTOLIC BLOOD PRESSURE: 166 MMHG

## 2023-08-22 LAB
ANION GAP SERPL CALCULATED.3IONS-SCNC: 7 MMOL/L
BASOPHILS # BLD AUTO: 0.13 THOUSANDS/ÂΜL (ref 0–0.1)
BASOPHILS NFR BLD AUTO: 1 % (ref 0–1)
BUN SERPL-MCNC: 14 MG/DL (ref 5–25)
CALCIUM SERPL-MCNC: 9.2 MG/DL (ref 8.4–10.2)
CHLORIDE SERPL-SCNC: 100 MMOL/L (ref 96–108)
CO2 SERPL-SCNC: 26 MMOL/L (ref 21–32)
CREAT SERPL-MCNC: 0.94 MG/DL (ref 0.6–1.3)
EOSINOPHIL # BLD AUTO: 0.65 THOUSAND/ÂΜL (ref 0–0.61)
EOSINOPHIL NFR BLD AUTO: 6 % (ref 0–6)
ERYTHROCYTE [DISTWIDTH] IN BLOOD BY AUTOMATED COUNT: 12.6 % (ref 11.6–15.1)
GFR SERPL CREATININE-BSD FRML MDRD: 79 ML/MIN/1.73SQ M
GLUCOSE SERPL-MCNC: 166 MG/DL (ref 65–140)
GLUCOSE SERPL-MCNC: 203 MG/DL (ref 65–140)
GLUCOSE SERPL-MCNC: 224 MG/DL (ref 65–140)
HCT VFR BLD AUTO: 38.6 % (ref 36.5–49.3)
HGB BLD-MCNC: 12.7 G/DL (ref 12–17)
IMM GRANULOCYTES # BLD AUTO: 0.06 THOUSAND/UL (ref 0–0.2)
IMM GRANULOCYTES NFR BLD AUTO: 1 % (ref 0–2)
LYMPHOCYTES # BLD AUTO: 3.49 THOUSANDS/ÂΜL (ref 0.6–4.47)
LYMPHOCYTES NFR BLD AUTO: 30 % (ref 14–44)
MCH RBC QN AUTO: 28.7 PG (ref 26.8–34.3)
MCHC RBC AUTO-ENTMCNC: 32.9 G/DL (ref 31.4–37.4)
MCV RBC AUTO: 87 FL (ref 82–98)
MONOCYTES # BLD AUTO: 0.94 THOUSAND/ÂΜL (ref 0.17–1.22)
MONOCYTES NFR BLD AUTO: 8 % (ref 4–12)
NEUTROPHILS # BLD AUTO: 6.53 THOUSANDS/ÂΜL (ref 1.85–7.62)
NEUTS SEG NFR BLD AUTO: 54 % (ref 43–75)
NRBC BLD AUTO-RTO: 0 /100 WBCS
PLATELET # BLD AUTO: 409 THOUSANDS/UL (ref 149–390)
PMV BLD AUTO: 7.8 FL (ref 8.9–12.7)
POTASSIUM SERPL-SCNC: 4 MMOL/L (ref 3.5–5.3)
RBC # BLD AUTO: 4.42 MILLION/UL (ref 3.88–5.62)
SODIUM SERPL-SCNC: 133 MMOL/L (ref 135–147)
WBC # BLD AUTO: 11.8 THOUSAND/UL (ref 4.31–10.16)

## 2023-08-22 PROCEDURE — 85025 COMPLETE CBC W/AUTO DIFF WBC: CPT | Performed by: INTERNAL MEDICINE

## 2023-08-22 PROCEDURE — 99239 HOSP IP/OBS DSCHRG MGMT >30: CPT | Performed by: INTERNAL MEDICINE

## 2023-08-22 PROCEDURE — 80048 BASIC METABOLIC PNL TOTAL CA: CPT | Performed by: INTERNAL MEDICINE

## 2023-08-22 PROCEDURE — 82948 REAGENT STRIP/BLOOD GLUCOSE: CPT

## 2023-08-22 RX ORDER — AMOXICILLIN 500 MG/1
500 CAPSULE ORAL EVERY 12 HOURS SCHEDULED
Qty: 12 CAPSULE | Refills: 0 | Status: SHIPPED | OUTPATIENT
Start: 2023-08-22 | End: 2023-08-28

## 2023-08-22 RX ADMIN — INSULIN LISPRO 1 UNITS: 100 INJECTION, SOLUTION INTRAVENOUS; SUBCUTANEOUS at 08:50

## 2023-08-22 RX ADMIN — AMPICILLIN SODIUM 1000 MG: 1 INJECTION, POWDER, FOR SOLUTION INTRAMUSCULAR; INTRAVENOUS at 08:54

## 2023-08-22 RX ADMIN — GABAPENTIN 300 MG: 300 CAPSULE ORAL at 08:50

## 2023-08-22 RX ADMIN — INSULIN LISPRO 2 UNITS: 100 INJECTION, SOLUTION INTRAVENOUS; SUBCUTANEOUS at 12:06

## 2023-08-22 RX ADMIN — CLOPIDOGREL BISULFATE 75 MG: 75 TABLET ORAL at 08:50

## 2023-08-22 RX ADMIN — CEFTRIAXONE 1000 MG: 1 INJECTION, SOLUTION INTRAVENOUS at 09:55

## 2023-08-22 RX ADMIN — OXYBUTYNIN CHLORIDE 5 MG: 5 TABLET, EXTENDED RELEASE ORAL at 08:50

## 2023-08-22 RX ADMIN — PANTOPRAZOLE SODIUM 40 MG: 40 TABLET, DELAYED RELEASE ORAL at 05:15

## 2023-08-22 RX ADMIN — GABAPENTIN 300 MG: 300 CAPSULE ORAL at 15:07

## 2023-08-22 RX ADMIN — ATORVASTATIN CALCIUM 80 MG: 80 TABLET, FILM COATED ORAL at 08:50

## 2023-08-22 RX ADMIN — AMLODIPINE BESYLATE 10 MG: 10 TABLET ORAL at 08:50

## 2023-08-22 RX ADMIN — PROPRANOLOL HYDROCHLORIDE 60 MG: 60 CAPSULE, EXTENDED RELEASE ORAL at 08:50

## 2023-08-22 RX ADMIN — ENOXAPARIN SODIUM 40 MG: 100 INJECTION SUBCUTANEOUS at 08:50

## 2023-08-22 RX ADMIN — AMPICILLIN SODIUM 1000 MG: 1 INJECTION, POWDER, FOR SOLUTION INTRAMUSCULAR; INTRAVENOUS at 02:13

## 2023-08-22 NOTE — PLAN OF CARE
Problem: Potential for Falls  Goal: Patient will remain free of falls  Description: INTERVENTIONS:  - Educate patient/family on patient safety including physical limitations  - Instruct patient to call for assistance with activity   - Consult OT/PT to assist with strengthening/mobility   - Keep Call bell within reach  - Keep bed low and locked with side rails adjusted as appropriate  - Keep care items and personal belongings within reach  - Initiate and maintain comfort rounds  - Make Fall Risk Sign visible to staff  - Offer Toileting every  Hours, in advance of need  - Initiate/Maintain alarm  - Obtain necessary fall risk management equipment:   - Apply yellow socks and bracelet for high fall risk patients  - Consider moving patient to room near nurses station  Outcome: Progressing     Problem: MOBILITY - ADULT  Goal: Maintain or return to baseline ADL function  Description: INTERVENTIONS:  -  Assess patient's ability to carry out ADLs; assess patient's baseline for ADL function and identify physical deficits which impact ability to perform ADLs (bathing, care of mouth/teeth, toileting, grooming, dressing, etc.)  - Assess/evaluate cause of self-care deficits   - Assess range of motion  - Assess patient's mobility; develop plan if impaired  - Assess patient's need for assistive devices and provide as appropriate  - Encourage maximum independence but intervene and supervise when necessary  - Involve family in performance of ADLs  - Assess for home care needs following discharge   - Consider OT consult to assist with ADL evaluation and planning for discharge  - Provide patient education as appropriate  Outcome: Progressing  Goal: Maintains/Returns to pre admission functional level  Description: INTERVENTIONS:  - Perform BMAT or MOVE assessment daily.   - Set and communicate daily mobility goal to care team and patient/family/caregiver.    - Collaborate with rehabilitation services on mobility goals if consulted  - Perform Range of Motion  times a day. - Reposition patient every  hours.   - Dangle patient  times a day  - Stand patient  times a day  - Ambulate patient  times a day  - Out of bed to chair  times a day   - Out of bed for meals  times a day  - Out of bed for toileting  - Record patient progress and toleration of activity level   Outcome: Progressing     Problem: PAIN - ADULT  Goal: Verbalizes/displays adequate comfort level or baseline comfort level  Description: Interventions:  - Encourage patient to monitor pain and request assistance  - Assess pain using appropriate pain scale  - Administer analgesics based on type and severity of pain and evaluate response  - Implement non-pharmacological measures as appropriate and evaluate response  - Consider cultural and social influences on pain and pain management  - Notify physician/advanced practitioner if interventions unsuccessful or patient reports new pain  Outcome: Progressing     Problem: INFECTION - ADULT  Goal: Absence or prevention of progression during hospitalization  Description: INTERVENTIONS:  - Assess and monitor for signs and symptoms of infection  - Monitor lab/diagnostic results  - Monitor all insertion sites, i.e. indwelling lines, tubes, and drains  - Monitor endotracheal if appropriate and nasal secretions for changes in amount and color  - [place] appropriate cooling/warming therapies per order  - Administer medications as ordered  - Instruct and encourage patient and family to use good hand hygiene technique  - Identify and instruct in appropriate isolation precautions for identified infection/condition  Outcome: Progressing  Goal: Absence of fever/infection during neutropenic period  Description: INTERVENTIONS:  - Monitor WBC    Outcome: Progressing     Problem: SAFETY ADULT  Goal: Patient will remain free of falls  Description: INTERVENTIONS:  - Educate patient/family on patient safety including physical limitations  - Instruct patient to call for assistance with activity   - Consult OT/PT to assist with strengthening/mobility   - Keep Call bell within reach  - Keep bed low and locked with side rails adjusted as appropriate  - Keep care items and personal belongings within reach  - Initiate and maintain comfort rounds  - Make Fall Risk Sign visible to staff  - Offer Toileting every  Hours, in advance of need  - Initiate/Maintain alarm  - Obtain necessary fall risk management equipment:   - Apply yellow socks and bracelet for high fall risk patients  - Consider moving patient to room near nurses station  Outcome: Progressing  Goal: Maintain or return to baseline ADL function  Description: INTERVENTIONS:  -  Assess patient's ability to carry out ADLs; assess patient's baseline for ADL function and identify physical deficits which impact ability to perform ADLs (bathing, care of mouth/teeth, toileting, grooming, dressing, etc.)  - Assess/evaluate cause of self-care deficits   - Assess range of motion  - Assess patient's mobility; develop plan if impaired  - Assess patient's need for assistive devices and provide as appropriate  - Encourage maximum independence but intervene and supervise when necessary  - Involve family in performance of ADLs  - Assess for home care needs following discharge   - Consider OT consult to assist with ADL evaluation and planning for discharge  - Provide patient education as appropriate  Outcome: Progressing  Goal: Maintains/Returns to pre admission functional level  Description: INTERVENTIONS:  - Perform BMAT or MOVE assessment daily.   - Set and communicate daily mobility goal to care team and patient/family/caregiver. - Collaborate with rehabilitation services on mobility goals if consulted  - Perform Range of Motion  times a day. - Reposition patient every  hours.   - Dangle patient  times a day  - Stand patient  times a day  - Ambulate patient  times a day  - Out of bed to chair  times a day   - Out of bed for meals  times a day  - Out of bed for toileting  - Record patient progress and toleration of activity level   Outcome: Progressing     Problem: Knowledge Deficit  Goal: Patient/family/caregiver demonstrates understanding of disease process, treatment plan, medications, and discharge instructions  Description: Complete learning assessment and assess knowledge base.   Interventions:  - Provide teaching at level of understanding  - Provide teaching via preferred learning methods  Outcome: Progressing     Problem: Prexisting or High Potential for Compromised Skin Integrity  Goal: Skin integrity is maintained or improved  Description: INTERVENTIONS:  - Identify patients at risk for skin breakdown  - Assess and monitor skin integrity  - Assess and monitor nutrition and hydration status  - Monitor labs   - Assess for incontinence   - Turn and reposition patient  - Assist with mobility/ambulation  - Relieve pressure over bony prominences  - Avoid friction and shearing  - Provide appropriate hygiene as needed including keeping skin clean and dry  - Evaluate need for skin moisturizer/barrier cream  - Collaborate with interdisciplinary team   - Patient/family teaching  - Consider wound care consult   Outcome: Progressing

## 2023-08-22 NOTE — ASSESSMENT & PLAN NOTE
Patient is a 65-year-old male with past medical history significant for multiple sclerosis, urinary retention with chronic suprapubic catheter and hypertension who presented to the ER with fever and symptoms that he states are identical to his previous urinary tract infections     · Diagnosed with sepsis, POA, with leukocytosis and tachycardia  · No lactic acidosis  · CT skin of the abdomen and pelvis revealed bilateral pyelonephritis   · Urine cultures and blood cultures pending  · continue empiric antibiotics  · Patient with recent admission 6/23 with sepsis secondary to obstructed and infected suprapubic catheter with cultures growing Enterococcus  · Continue IV Rocephin and ampicillin as previously recommended by infectious disease on prior admission  · Blood cultures negative thus far  · Follow-up urine cultures

## 2023-08-22 NOTE — DISCHARGE SUMMARY
[facility]  Discharge- Rozina Simmons 1950, 75 y.o. male MRN: [id number]  Unit/Bed#: E4 -01 Encounter: [id number]  Primary Care Provider: Regina Fine DO   Date and time admitted to hospital: 8/18/2023  8:16 PM    * Sepsis due to urinary tract infection [facility])  Assessment & Plan  Patient is a 76-year-old male with past medical history significant for multiple sclerosis, urinary retention with chronic suprapubic catheter and hypertension who presented to the ER with fever and symptoms that he states are identical to his previous urinary tract infections     · Diagnosed with sepsis, POA, with leukocytosis and tachycardia  · No lactic acidosis  · CT skin of the abdomen and pelvis revealed bilateral pyelonephritis   · Urine cultures and blood cultures pending  · continue empiric antibiotics  · Patient with recent admission 6/23 with sepsis secondary to obstructed and infected suprapubic catheter with cultures growing Enterococcus  · Started on IV ceftriaxone and ampicillin  as previously recommended by infectious disease on prior admission  · Blood cultures negative thus far  · Urine cultures reveal beta-hemolytic Streptococcus group B, Aerococcus, Staphylococcus epidermidis, Acinetobacter  · Discharged home on amoxicillin to complete 10-day course      Pyelonephritis  Assessment & Plan  · Patient presented with criteria for sepsis   · urinalysis with negative nitrates, large leukocyte Estrace, and innumerable white blood cells  · Initially this was attributed to colonization, due to patient's chronic suprapubic catheter, rather than true infection  · However CT scan revealed bilateral right greater than left pyelonephritis and cystitis  · Urine cultures pending  · Likely secondary to pyelonephritis, secondary to chronic suprapubic catheter  · Continue antibiotics while cultures are pending    Hyponatremia  Assessment & Plan  · Per old records, patient has chronic hyponatremia with a baseline sodium ranging approximately 129-134  · Current sodium at his baseline  · Possibly medication related    History of CVA (cerebrovascular accident)  Assessment & Plan  · History of CVA in 2018  · On Plavix and statin    Type 2 diabetes mellitus with hyperglycemia, without long-term current use of insulin (HCC)  Assessment & Plan  trulicity  Multiple sclerosis ([address])  Assessment & Plan  · Longstanding history of MS since 1960's, not maintained on DMD.  Wheelchair-bound  · Follows [facility]    Primary hypertension  Assessment & Plan  · Continue home regimen with propranolol LA 60 mg daily and Norvasc 10 mg daily  · Blood pressure adequately controlled.   Continue medications and monitor, titrate as needed    Hyperlipidemia  Assessment & Plan  · Statin    Generalized anxiety disorder  Assessment & Plan  · Continue home regimen with Zoloft 25 mg nightly and Xanax 0.25 mg bid prn, verified on PDMP    Chronic suprapubic catheter (HCC)  Assessment & Plan  · History of MS with neurogenic bladder, BPH, previous TURP managed with chronic suprapubic catheter  · Urology consulted: Performed suprapubic catheter exchange 8/20    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HCC)  Assessment & Plan  · S/P stent assisted coil embolization of a basilar artery apex aneurysm in 2015   · Outpatient follow-up        Transition [facility] Care Discharge Summary - [facility]    Patient Information: Ismael Kayser 75 y.o. male MRN: [id number]  Unit/Bed#: E4 -01 Encounter: [id number]    Discharging Physician / Practitioner: Vitor Kendrick MD  PCP: Meek Hollingsworth DO  Admission Date: 8/18/2023  Discharge Date: 08/22/23    Disposition:      Other: home      Reason for Admission: Sepsis    Discharge Diagnoses:     Principal Problem:    Sepsis due to urinary tract infection ([address])  Active Problems:    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[address])    Chronic suprapubic catheter ([address])    Generalized anxiety disorder Hyperlipidemia    Primary hypertension    Multiple sclerosis (HCC)    Type 2 diabetes mellitus with hyperglycemia, without long-term current use of insulin (HCC)    History of CVA (cerebrovascular accident)    Hyponatremia    Pyelonephritis  Resolved Problems:    * No resolved hospital problems. *      Consultations During Hospital Stay:  · IP CONSULT TO UROLOGY      Procedures Performed:     · none    Medication Adjustments and Discharge Medications:  · Medication Dosing Tapers - Please refer to Discharge Medication List for details on any medication dosing tapers (if applicable to patient). · Discharge Medication List: See after visit summary for reconciled discharge medications. Wound Care Recommendations:  When applicable, please see wound care section of After Visit Summary. Diet Recommendations at Discharge:  Diet -        Diet Orders   (From admission, onward)             Start     Ordered    08/19/23 0137  Diet Mikey/CHO Controlled; Consistent Carbohydrate Diet Level 2 (5 carb servings/75 grams CHO/meal); Sodium 4 GM (HAL)  Diet effective now        References:    Adult Nutrition Support Algorithm    RD Therapeutic Diet Order Protocol   Question Answer Comment   Diet Type Mikey/CHO Controlled    Mikey/CHO Controlled Consistent Carbohydrate Diet Level 2 (5 carb servings/75 grams CHO/meal)    Other Restriction(s): Sodium 4 GM (HAL)    RD to adjust diet per protocol? Yes        08/19/23 0138              Fluid Restriction - No Fluid Restriction at Discharge. Significant Findings / Test Results:     PE Study with CT Abdomen and Pelvis with contrast    Result Date: 8/18/2023  · Impression: Bilateral right greater than left pyelonephritis Cystitis Workstation performed: [id number]       Hospital Course:     Macrina Lowe is a 75 y.o. male patient who originally presented to the hospital on 8/18/2023 due to abscess secondary to UTI.   Patient's history of multiple sclerosis, urinary retention with chronic suprapubic catheter and hypertension. Patient started on IV antibiotics, followed up cultures and patient is now transition to amoxicillin. He denies any other complaints. Will be discharged home today with outpatient follow-up. Please see above problem list for further details. Condition at Discharge: good     Discharge Day Visit / Exam:     Subjective: Seen and examined at bedside, denies any abdominal pain, nausea or vomiting. Denies any dysuria or hematuria. Vitals: Blood Pressure: 166/76 (08/22/23 1100)  Pulse: 61 (08/22/23 1100)  Temperature: 97.7 °F (36.5 °C) (08/22/23 1100)  Temp Source: Tympanic (08/22/23 1100)  Respirations: 20 (08/22/23 1100)  Height: 5' 4" (162.6 cm) (08/19/23 0100)  Weight - Scale: 72.3 kg (159 lb 6.3 oz) (08/19/23 0100)  SpO2: 95 % (08/22/23 1100)    Physical Exam:    Constitutional: Patient is oriented to person, place and time, no acute distress  HEENT:  Normocephalic, atraumatic  Cardiovascular: Normal S1S2, RRR, No murmurs/rubs/gallops appreciated. Pulmonary:  Bilateral air entry, No rhonchi/rales/wheezing appreciated  Abdominal: Soft, Bowel sounds present, Non-tender, Non-distended  Extremities:  No cyanosis, clubbing or edema. Neurological: Awake, alert    Discharge instructions/Information to patient and family:   See after visit summary section titled Discharge Instructions for information provided to patient and family. Planned Readmission: no     Discharge Statement:  I spent 35 minutes discharging the patient. This time was spent on the day of discharge. I had direct contact with the patient on the day of discharge. Greater than 50% of the total time was spent examining patient, answering all patient questions, arranging and discussing plan of care with patient as well as directly providing post-discharge instructions. Additional time then spent on discharge activities.     ** Please Note: This note has been constructed using a voice recognition system **

## 2023-08-24 LAB
BACTERIA BLD CULT: NORMAL
BACTERIA BLD CULT: NORMAL

## 2023-08-27 PROBLEM — A41.9 SEPSIS (HCC): Status: RESOLVED | Noted: 2021-06-02 | Resolved: 2023-08-27

## 2023-08-28 ENCOUNTER — OFFICE VISIT (OUTPATIENT)
Dept: NEUROLOGY | Facility: CLINIC | Age: 73
End: 2023-08-28
Payer: MEDICARE

## 2023-08-28 VITALS
OXYGEN SATURATION: 99 % | WEIGHT: 190 LBS | BODY MASS INDEX: 32.44 KG/M2 | HEIGHT: 64 IN | DIASTOLIC BLOOD PRESSURE: 63 MMHG | HEART RATE: 99 BPM | RESPIRATION RATE: 17 BRPM | SYSTOLIC BLOOD PRESSURE: 131 MMHG | TEMPERATURE: 97.6 F

## 2023-08-28 DIAGNOSIS — G35 MULTIPLE SCLEROSIS (HCC): Primary | ICD-10-CM

## 2023-08-28 DIAGNOSIS — Z86.73 PERSONAL HISTORY OF TRANSIENT ISCHEMIC ATTACK (TIA), AND CEREBRAL INFARCTION WITHOUT RESIDUAL DEFICITS: ICD-10-CM

## 2023-08-28 DIAGNOSIS — I72.5 ANEURYSM OF BASILAR ARTERY (HCC): ICD-10-CM

## 2023-08-28 DIAGNOSIS — Z86.73 HISTORY OF CVA (CEREBROVASCULAR ACCIDENT): ICD-10-CM

## 2023-08-28 PROBLEM — S09.90XA HEAD INJURY: Status: RESOLVED | Noted: 2021-07-21 | Resolved: 2023-08-28

## 2023-08-28 PROBLEM — G93.41 ACUTE METABOLIC ENCEPHALOPATHY: Status: RESOLVED | Noted: 2023-04-10 | Resolved: 2023-08-28

## 2023-08-28 PROCEDURE — 99214 OFFICE O/P EST MOD 30 MIN: CPT | Performed by: PHYSICIAN ASSISTANT

## 2023-08-28 NOTE — PROGRESS NOTES
Patient ID: Brenda Burch is a 75 y.o. male. Assessment/Plan:    Multiple sclerosis ([address])  Patient with very longstanding MS, not currently on a disease modifying agent. Rex Anderson is wheelchair-bound, can assist transfers. Rex Anderson has not had any new MS symptoms.      He has been hospitalized multiple times over the last few months with catheter associated UTIs, pyelonephritis. He follows with  Urology but has not had an office visit in quite some time. He should schedule a follow up. [facility] takes care of all of his appts. He also has c/o SOB for quite some time, we discussed at our visit last year as well. He saw pulmonology about a year ago, testing was ordered but never completed and no follow up. Pulse Ox 99% on RA today, breathing is not labored. He says he brings this up to his team at [facility] but nothing has really been done. Strongly advised seeing pulmonology again and completing testing that was ordered. I am sending my notes over to [facility]. I have called them several times about this patient in the past.  He needs ongoing workup for his medical issues and follow ups with appropriate specialists. Neurologic exam is stable. History of CVA (cerebrovascular accident)  Patient with left-sided lacunar infarct in 2018. He is maintained on Plavix 75 mg daily and atorvastatin 80 mg daily (a component in [place]uet, along with amlodipine).        No new neurologic symptoms to indicate recurrent TIA/CVA.     Advised to continue to follow with PCP to maintain good control of cardiovascular risk factors including BP (goal <130/80), lipids and blood sugar (goal A1C <7.0.). Heart healthy diet and routine exercise as tolerated.     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HCC)  S/P stent assisted coil embolization of a basilar artery apex aneurysm in March 2015 by Dr. Saige Terrell has been following with Dr. Judith Priest and imaging completed in 2020 was stable. Rex Anderson has now been told that he does not need routine follow-up of this any longer unless any concerns. He did have a CTA in April 2023 and no evidence of aneurysm.  Discussed optimal BP management. Patient will follow up in 6 months or sooner if needed. Diagnoses and all orders for this visit:    Multiple sclerosis [facility])  -     Ambulatory Referral to Neurology    History of CVA (cerebrovascular accident)    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[address])    Personal history of transient ischemic attack (TIA), and cerebral infarction without residual deficits  -     Ambulatory Referral to Neurology         Subjective:    EVAN Nogueira is a 75year old male who presents for follow up. He was last seen in February 2023. To review, patient diagnosed with probable MS in the 1960s. Actually unclear diagnosis from time of presentation to our center in 2014. Patient recalls at age of 11 having chest pain and then numbness of the left toes tingling up the leg to mid chest around T6 and then down the right side in similar distribution to the right foot. Patient had loss of control of bowel and bladder at that time. He had no ability to walk and was in the hospital for over 6 months. He was only able to ambulate with crutches in his 20s, 30s, 40s, etc. Patient still uses [place] crutches as well as a wheelchair to ambulate. Patient was only ever given ACTH in the past. He was never on IMD meds prior to coming to our center in 2014. He was told he may have had a CVA that affected his walking. Patient with suprapubic catheter placed by Dr. Cali Kirkland. Labs unremarkable, NMO negative. 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. MRI c-spine did not reveal any lesions. MRI t-spine reveals cord lesion from T3-5; no comparison on file.  Patient with longstanding dizziness; he has strong family history of brain aneurysms- his sister had 3 aneurysms, 1 niece with 3 aneurysms, 1 niece who passed away from aneurysm and 1 nephew with aneurysm; patient's father with aneurysm in his abdominal area. He had CTA in January 2015, 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. He was seen neuro-surg and underwent 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 in March 2015 by Dr. Shena Chairez of neuro-surg. He now follows with Dr. Nick Yadav for monitoring of the aneurysm. After his updated imaging in 2020, which was stable, he was told to f/u with neurosurgery PRN. Patient was hospitalized in October 2018 due to RUE weakness and worsened RLE weakness (weak at baseline due to [id number] [facility]). NIHSS 7. He was not given tPA due to concern this was MS exacerbation. CTH negative for acute infarction. CTA head and neck demonstrated mild narrowing at the origin of the left common carotid artery, vertebral arteries bilaterally, and right internal carotid artery origins without significant stenosis. Approximately 50% narrowing at the origin of the left internal carotid artery. Mild narrowing of the cavernous to supraclinoid internal carotid arteries. No high-grade proximal stenosis to visualized [place]. Stable aneurysm coil seen at the basilar tip. Patient was admitted for further workup. He was also started on antibiotics for suspected UTI. MRI brain demonstrated a 1.2 cm focal area of diffusion restriction adjacent to the posterior body of the left lateral ventricle in the region of the posterior corona radiata without associated contrast enhancement compatible with acute lacunar infarct. A1C 8.4. Lipid panel , . ECHO with EF 75%, no regional wall motion abnormalities. No atrial dilation. The stroke was felt to be small vessel in origin. His ASA was changed to Plavix 75mg and Lipitor was increased from 20mg to 80mg daily. Today, patient reports he is overall doing ok from a neurologic standpoint. He denies any new MS symptoms at this time. He remains on Plavix and statin. He has had several ED visits and hospitalizations since his last visit here.   He was admitted in April 1955 for metabolic encephalopathy due to UTI, hyponatremia, Xanax use, dehydration. In June 2023 he was admitted twice for sepsis related to UTI and pyelonephritis. He had 2 ED visits in June after those admissions for SOB. He was more recently admitted 8/18/23-8/22/23 for UTI and pyelonephritis after presenting with nausea, abdominal pain, SOB. He tells me he continues to have nausea, SOB. He saw pulmonology about a year ago for his SOB complaints, had testing ordered including PFTs, sleep study, but none of those tests were completed and he has not followed. He reports he goes to [facility] once a week and has told them all of these symptoms. [employer] is in charge of all of his appts. The following portions of the patient's history were reviewed and updated as appropriate: current medications, past family history, past medical history, past social history, past surgical history and problem list.       Objective:    Blood pressure 131/63, pulse 99, temperature 97.6 °F (36.4 °C), temperature source Tympanic, resp. rate 17, height 5' 4" (1.626 m), weight 86.2 kg (190 lb), SpO2 99 %. Physical Exam  Constitutional:       Appearance: Normal appearance. HENT:      Head: Normocephalic and atraumatic. Eyes:      Extraocular Movements: EOM normal.      Pupils: Pupils are equal, round, and reactive to light. Neurological:      Mental Status: He is alert. Deep Tendon Reflexes:      Reflex Scores:       Bicep reflexes are 2+ on the right side and 2+ on the left side. Brachioradialis reflexes are 2+ on the right side and 2+ on the left side. Patellar reflexes are 3+ on the right side and 3+ on the left side. Achilles reflexes are 2+ on the right side and 2+ on the left side. Psychiatric:         Mood and Affect: Mood normal.         Speech: Speech normal.         Behavior: Behavior normal.         Neurological Exam  Mental Status  Alert.  Oriented to person, place, time and situation. Speech is normal. Language is fluent with no aphasia. Attention and concentration are normal.    Cranial Nerves  CN II: Visual fields full to confrontation. CN III, IV, VI: Extraocular movements intact bilaterally. Pupils equal round and reactive to light bilaterally. CN V: Facial sensation is normal.  CN VII: Full and symmetric facial movement. CN VIII: Hearing is normal.  CN IX, X: Palate elevates symmetrically  CN XI: Shoulder shrug strength is normal.  CN XII: Tongue midline without atrophy or fasciculations. Motor   No abnormal involuntary movements. Right                     Left   Shoulder abduction               5                          5  Elbow flexion                         5                          5  Elbow extension                    5                          5  Hip flexion                              1                          2  Dorsiflexion                            1                          2    Sensory  Light touch is normal in upper and lower extremities. Reflexes                                            Right                      Left  Brachioradialis                    2+                         2+  Biceps                                 2+                         2+  Patellar                                3+                         3+  Achilles                                2+                         2+    Coordination  Right: Finger-to-nose normal.Left: Finger-to-nose normal.    Gait    Non-ambulatory, in a WC.        ROS:    Review of Systems   Constitutional: Positive for appetite change (decrease) and fatigue. Negative for fever. HENT: Negative. Negative for hearing loss, tinnitus, trouble swallowing and voice change. Eyes: Negative. Negative for photophobia, pain and visual disturbance. Respiratory: Positive for shortness of breath. Cardiovascular: Positive for palpitations. Gastrointestinal: Positive for constipation and nausea. Negative for vomiting. Endocrine: Negative. Negative for cold intolerance. Genitourinary: Negative. Negative for dysuria, frequency and urgency. Super pubic catheter   Musculoskeletal: Positive for gait problem (non weightbearing) and neck pain. Negative for back pain and myalgias. Skin: Negative. Negative for rash. Allergic/Immunologic: Negative. Neurological: Positive for speech difficulty, weakness and light-headedness. Negative for dizziness, tremors, seizures, syncope, facial asymmetry, numbness and headaches. Hematological: Bruises/bleeds easily. Psychiatric/Behavioral: Positive for sleep disturbance. Negative for confusion and hallucinations. The patient is nervous/anxious.          Memory issues-short term     I personally reviewed and updated the ROS as appropriate

## 2023-08-28 NOTE — PATIENT INSTRUCTIONS
Patient should continue Plavix 75mg daily and statin (on atorvastatin 80mg daily) for ongoing stroke prevention  Will defer management of blood pressure, cholesterol and blood sugar to PCP. Goal BP is <130/80 routinely, goal LDL <70, goal A1C <7.0  Heart healthy diet and routine exercise as tolerated are advised    Patient has been hospitalized multiple times for UTIs and pyelonephritis over the last few months. Does not appear he has any scheduled visits with outpatient urology. Strongly recommend he make a follow up appt with [facility]rology who follows him for this. Continue catheter care as per urology. Patient also is overdue to see pulmonology, last seen nearly 1 year ago. Testing was ordered but never completed and patient is still reporting ongoing shortness of breath. Strongly recommend outpatient visit with [facility] pulmonology and further workup for patient's SOB     Follow up in 6 months or sooner if needed    If you experience any facial droop, weakness on one side of the body, speech or swallowing difficulty, painless loss of vision in one eye, double vision, vertigo that does not resolve quickly/imbalance, go to the ER/call 911.

## 2023-08-28 NOTE — ASSESSMENT & PLAN NOTE
Patient with very longstanding MS, not currently on a disease modifying agent. Linda Muniz is wheelchair-bound, can assist transfers. Linda Muniz has not had any new MS symptoms.      He has been hospitalized multiple times over the last few months with catheter associated UTIs, pyelonephritis. He follows with  Urology but has not had an office visit in quite some time. He should schedule a follow up. [facility] takes care of all of his appts. He also has c/o SOB for quite some time, we discussed at our visit last year as well. He saw pulmonology about a year ago, testing was ordered but never completed and no follow up. Pulse Ox 99% on RA today, breathing is not labored. He says he brings this up to his team at [employer] [facility] but nothing has really been done. Strongly advised seeing pulmonology again and completing testing that was ordered. I am sending my notes over to [employer]. I have called them several times about this patient in the past.  He needs ongoing workup for his medical issues and follow ups with appropriate specialists. Neurologic exam is stable.

## 2023-08-28 NOTE — ASSESSMENT & PLAN NOTE
S/P stent assisted coil embolization of a basilar artery apex aneurysm in March 2015 by Dr. Elen Deluna has been following with Dr. Montez Osgood and imaging completed in 2020 was stable. Anais Angeles has now been told that he does not need routine follow-up of this any longer unless any concerns. He did have a CTA in April 2023 and no evidence of aneurysm.  Discussed optimal BP management.

## 2023-08-28 NOTE — ASSESSMENT & PLAN NOTE
Patient with left-sided lacunar infarct in 2018. He is maintained on Plavix 75 mg daily and atorvastatin 80 mg daily (a component in Caduet, along with amlodipine).        No new neurologic symptoms to indicate recurrent TIA/CVA.     Advised to continue to follow with PCP to maintain good control of cardiovascular risk factors including BP (goal <130/80), lipids and blood sugar (goal A1C <7.0.). Heart healthy diet and routine exercise as tolerated.

## 2023-09-05 ENCOUNTER — LAB REQUISITION (OUTPATIENT)
Dept: LAB | Facility: HOSPITAL | Age: 73
End: 2023-09-05
Payer: MEDICARE

## 2023-09-05 DIAGNOSIS — Z00.00 ENCOUNTER FOR GENERAL ADULT MEDICAL EXAMINATION WITHOUT ABNORMAL FINDINGS: ICD-10-CM

## 2023-09-05 LAB
ALBUMIN SERPL BCP-MCNC: 4.1 G/DL (ref 3.5–5)
ALP SERPL-CCNC: 108 U/L (ref 34–104)
ALT SERPL W P-5'-P-CCNC: 20 U/L (ref 7–52)
ANION GAP SERPL CALCULATED.3IONS-SCNC: 13 MMOL/L
AST SERPL W P-5'-P-CCNC: 14 U/L (ref 13–39)
BASOPHILS # BLD AUTO: 0.13 THOUSANDS/ÂΜL (ref 0–0.1)
BASOPHILS NFR BLD AUTO: 1 % (ref 0–1)
BILIRUB SERPL-MCNC: 0.49 MG/DL (ref 0.2–1)
BUN SERPL-MCNC: 11 MG/DL (ref 5–25)
CALCIUM SERPL-MCNC: 10.4 MG/DL (ref 8.4–10.2)
CHLORIDE SERPL-SCNC: 95 MMOL/L (ref 96–108)
CHOLEST SERPL-MCNC: 160 MG/DL
CO2 SERPL-SCNC: 25 MMOL/L (ref 21–32)
CREAT SERPL-MCNC: 0.87 MG/DL (ref 0.6–1.3)
EOSINOPHIL # BLD AUTO: 0.52 THOUSAND/ÂΜL (ref 0–0.61)
EOSINOPHIL NFR BLD AUTO: 5 % (ref 0–6)
ERYTHROCYTE [DISTWIDTH] IN BLOOD BY AUTOMATED COUNT: 13.3 % (ref 11.6–15.1)
EST. AVERAGE GLUCOSE BLD GHB EST-MCNC: 258 MG/DL
GFR SERPL CREATININE-BSD FRML MDRD: 85 ML/MIN/1.73SQ M
GLUCOSE SERPL-MCNC: 335 MG/DL (ref 65–140)
HBA1C MFR BLD: 10.6 %
HCT VFR BLD AUTO: 48.6 % (ref 36.5–49.3)
HDLC SERPL-MCNC: 55 MG/DL
HGB BLD-MCNC: 15.3 G/DL (ref 12–17)
IMM GRANULOCYTES # BLD AUTO: 0.06 THOUSAND/UL (ref 0–0.2)
IMM GRANULOCYTES NFR BLD AUTO: 1 % (ref 0–2)
LDLC SERPL CALC-MCNC: 89 MG/DL (ref 0–100)
LYMPHOCYTES # BLD AUTO: 2.74 THOUSANDS/ÂΜL (ref 0.6–4.47)
LYMPHOCYTES NFR BLD AUTO: 26 % (ref 14–44)
MCH RBC QN AUTO: 28.6 PG (ref 26.8–34.3)
MCHC RBC AUTO-ENTMCNC: 31.5 G/DL (ref 31.4–37.4)
MCV RBC AUTO: 91 FL (ref 82–98)
MONOCYTES # BLD AUTO: 0.98 THOUSAND/ÂΜL (ref 0.17–1.22)
MONOCYTES NFR BLD AUTO: 9 % (ref 4–12)
NEUTROPHILS # BLD AUTO: 6.31 THOUSANDS/ÂΜL (ref 1.85–7.62)
NEUTS SEG NFR BLD AUTO: 58 % (ref 43–75)
NONHDLC SERPL-MCNC: 105 MG/DL
NRBC BLD AUTO-RTO: 0 /100 WBCS
PLATELET # BLD AUTO: 296 THOUSANDS/UL (ref 149–390)
PMV BLD AUTO: 9.7 FL (ref 8.9–12.7)
POTASSIUM SERPL-SCNC: 4.1 MMOL/L (ref 3.5–5.3)
PROT SERPL-MCNC: 7.8 G/DL (ref 6.4–8.4)
RBC # BLD AUTO: 5.35 MILLION/UL (ref 3.88–5.62)
SODIUM SERPL-SCNC: 133 MMOL/L (ref 135–147)
T4 FREE SERPL-MCNC: 1.15 NG/DL (ref 0.61–1.12)
TRIGL SERPL-MCNC: 80 MG/DL
TSH SERPL DL<=0.05 MIU/L-ACNC: 3.44 UIU/ML (ref 0.45–4.5)
WBC # BLD AUTO: 10.74 THOUSAND/UL (ref 4.31–10.16)

## 2023-09-05 PROCEDURE — 80061 LIPID PANEL: CPT | Performed by: FAMILY MEDICINE

## 2023-09-05 PROCEDURE — 84439 ASSAY OF FREE THYROXINE: CPT | Performed by: FAMILY MEDICINE

## 2023-09-05 PROCEDURE — 85025 COMPLETE CBC W/AUTO DIFF WBC: CPT | Performed by: FAMILY MEDICINE

## 2023-09-05 PROCEDURE — 83036 HEMOGLOBIN GLYCOSYLATED A1C: CPT | Performed by: FAMILY MEDICINE

## 2023-09-05 PROCEDURE — 84443 ASSAY THYROID STIM HORMONE: CPT | Performed by: FAMILY MEDICINE

## 2023-09-05 PROCEDURE — 80053 COMPREHEN METABOLIC PANEL: CPT | Performed by: FAMILY MEDICINE

## 2023-09-05 NOTE — TELEPHONE ENCOUNTER
Message left for patient that Cystoscopy was scheduled with Dr. Maryjane Butler at [facility] 1/29/24. Central scheduling number provided as well for patient to have ultrasound done prior to visit.

## 2023-09-08 ENCOUNTER — TELEPHONE (OUTPATIENT)
Dept: PULMONOLOGY | Facility: CLINIC | Age: 73
End: 2023-09-08

## 2023-09-08 DIAGNOSIS — R06.02 SHORTNESS OF BREATH: Primary | ICD-10-CM

## 2023-09-08 NOTE — TELEPHONE ENCOUNTER
Mitzi Perkins form [employer] calling stating she is trying to schedule patients PFT and ABG but the order expires 9/12/23. He will need a new order put in so they can schedule test. Please advise.

## 2023-09-11 ENCOUNTER — APPOINTMENT (OUTPATIENT)
Dept: LAB | Facility: HOSPITAL | Age: 73
End: 2023-09-11
Payer: MEDICARE

## 2023-09-11 DIAGNOSIS — Z00.00 ROUTINE MEDICAL EXAM: ICD-10-CM

## 2023-09-11 LAB — 25(OH)D3 SERPL-MCNC: 11.2 NG/ML (ref 30–100)

## 2023-09-11 PROCEDURE — 82306 VITAMIN D 25 HYDROXY: CPT

## 2023-09-11 PROCEDURE — 36415 COLL VENOUS BLD VENIPUNCTURE: CPT

## 2023-09-18 ENCOUNTER — APPOINTMENT (EMERGENCY)
Dept: CT IMAGING | Facility: HOSPITAL | Age: 73
End: 2023-09-18
Payer: MEDICARE

## 2023-09-18 ENCOUNTER — HOSPITAL ENCOUNTER (INPATIENT)
Facility: HOSPITAL | Age: 73
LOS: 1 days | Discharge: HOME | End: 2023-09-20
Attending: EMERGENCY MEDICINE | Admitting: INTERNAL MEDICINE
Payer: MEDICARE

## 2023-09-18 DIAGNOSIS — R29.898 LEG WEAKNESS: ICD-10-CM

## 2023-09-18 DIAGNOSIS — M25.552 LEFT HIP PAIN: Primary | ICD-10-CM

## 2023-09-18 LAB
ALBUMIN SERPL BCP-MCNC: 4 G/DL (ref 3.5–5)
ALP SERPL-CCNC: 96 U/L (ref 34–104)
ALT SERPL W P-5'-P-CCNC: 24 U/L (ref 7–52)
ANION GAP SERPL CALCULATED.3IONS-SCNC: 8 MMOL/L
AST SERPL W P-5'-P-CCNC: 20 U/L (ref 13–39)
BASOPHILS # BLD AUTO: 0.11 THOUSANDS/ÂΜL (ref 0–0.1)
BASOPHILS NFR BLD AUTO: 1 % (ref 0–1)
BILIRUB SERPL-MCNC: 0.55 MG/DL (ref 0.2–1)
BUN SERPL-MCNC: 15 MG/DL (ref 5–25)
CALCIUM SERPL-MCNC: 10 MG/DL (ref 8.4–10.2)
CHLORIDE SERPL-SCNC: 96 MMOL/L (ref 96–108)
CO2 SERPL-SCNC: 25 MMOL/L (ref 21–32)
CREAT SERPL-MCNC: 1.14 MG/DL (ref 0.6–1.3)
EOSINOPHIL # BLD AUTO: 0.58 THOUSAND/ÂΜL (ref 0–0.61)
EOSINOPHIL NFR BLD AUTO: 4 % (ref 0–6)
ERYTHROCYTE [DISTWIDTH] IN BLOOD BY AUTOMATED COUNT: 13.2 % (ref 11.6–15.1)
GFR SERPL CREATININE-BSD FRML MDRD: 63 ML/MIN/1.73SQ M
GLUCOSE SERPL-MCNC: 171 MG/DL (ref 65–140)
HCT VFR BLD AUTO: 43.6 % (ref 36.5–49.3)
HGB BLD-MCNC: 14.3 G/DL (ref 12–17)
IMM GRANULOCYTES # BLD AUTO: 0.08 THOUSAND/UL (ref 0–0.2)
IMM GRANULOCYTES NFR BLD AUTO: 1 % (ref 0–2)
LIPASE SERPL-CCNC: 24 U/L (ref 11–82)
LYMPHOCYTES # BLD AUTO: 2.89 THOUSANDS/ÂΜL (ref 0.6–4.47)
LYMPHOCYTES NFR BLD AUTO: 18 % (ref 14–44)
MCH RBC QN AUTO: 28.9 PG (ref 26.8–34.3)
MCHC RBC AUTO-ENTMCNC: 32.8 G/DL (ref 31.4–37.4)
MCV RBC AUTO: 88 FL (ref 82–98)
MONOCYTES # BLD AUTO: 0.93 THOUSAND/ÂΜL (ref 0.17–1.22)
MONOCYTES NFR BLD AUTO: 6 % (ref 4–12)
NEUTROPHILS # BLD AUTO: 11.3 THOUSANDS/ÂΜL (ref 1.85–7.62)
NEUTS SEG NFR BLD AUTO: 70 % (ref 43–75)
NRBC BLD AUTO-RTO: 0 /100 WBCS
PLATELET # BLD AUTO: 276 THOUSANDS/UL (ref 149–390)
PMV BLD AUTO: 8.2 FL (ref 8.9–12.7)
POTASSIUM SERPL-SCNC: 4.9 MMOL/L (ref 3.5–5.3)
PROT SERPL-MCNC: 7.7 G/DL (ref 6.4–8.4)
RBC # BLD AUTO: 4.95 MILLION/UL (ref 3.88–5.62)
SODIUM SERPL-SCNC: 129 MMOL/L (ref 135–147)
WBC # BLD AUTO: 15.89 THOUSAND/UL (ref 4.31–10.16)

## 2023-09-18 PROCEDURE — 74177 CT ABD & PELVIS W/CONTRAST: CPT

## 2023-09-18 PROCEDURE — 99285 EMERGENCY DEPT VISIT HI MDM: CPT

## 2023-09-18 PROCEDURE — 99285 EMERGENCY DEPT VISIT HI MDM: CPT | Performed by: EMERGENCY MEDICINE

## 2023-09-18 PROCEDURE — 36415 COLL VENOUS BLD VENIPUNCTURE: CPT | Performed by: EMERGENCY MEDICINE

## 2023-09-18 PROCEDURE — 83690 ASSAY OF LIPASE: CPT | Performed by: EMERGENCY MEDICINE

## 2023-09-18 PROCEDURE — G1004 CDSM NDSC: HCPCS

## 2023-09-18 PROCEDURE — 85025 COMPLETE CBC W/AUTO DIFF WBC: CPT | Performed by: EMERGENCY MEDICINE

## 2023-09-18 PROCEDURE — 80053 COMPREHEN METABOLIC PANEL: CPT | Performed by: EMERGENCY MEDICINE

## 2023-09-18 RX ADMIN — IOHEXOL 100 ML: 350 INJECTION, SOLUTION INTRAVENOUS at 19:53

## 2023-09-18 NOTE — ED PROVIDER NOTES
History  Chief Complaint   Patient presents with   • Hip Pain     Patient reports increase in L hip pain and difficulty ambulating. Patient reports fell 1-2 weeks ago. Pt is a 67yo M who presents for left hip pain. Patient reports that earlier today he developed left hip pain that he states felt like "pins-and-needles". Patient denies any trauma or injury today but does state 1 to 2 weeks ago he fell. Patient states he does not believe he fell onto his left hip and has been not having pain since that time. Patient reports that he does not ambulate at baseline but is usually able to stand and transfer into his wheelchair. Patient states today due to the pain he was unable to stand and transfer. Patient denies any other associated pain. Patient denies any recent illness. Patient does report history of MS and states he feels as though this might be a flare. Patient states he is not on any chronic medications for his MS but does follow with neurology. Patient states he lives at home with his brother and sister who assist him with daily activities. Prior to Admission Medications   Prescriptions Last Dose Informant Patient Reported? Taking?    ALPRAZolam (XANAX) 0.25 mg tablet 9/18/2023 Self Yes Yes   Sig: Take 0.25 mg by mouth 2 (two) times a day as needed for anxiety or sleep    Dulaglutide (Trulicity) 4.84 ZF/7.5EA SOPN 9/18/2023 Self Yes Yes   Sig: Inject 0.75 mg under the skin once a week   Ergocalciferol (VITAMIN D2 PO) 9/18/2023 Self Yes Yes   Sig: Take 50,000 Units by mouth once a week   albuterol (2.5 mg/3 mL) 0.083 % nebulizer solution 9/18/2023 Self Yes Yes   Sig: Take 2.5 mg by nebulization every 6 (six) hours as needed for wheezing or shortness of breath   albuterol (PROVENTIL HFA,VENTOLIN HFA) 90 mcg/act inhaler 9/18/2023 Self Yes Yes   Sig: Inhale 2 puffs every 6 (six) hours as needed for wheezing   amLODIPine-atorvastatin (CADUET) 10-80 MG per tablet 9/18/2023 Self Yes Yes   Sig: Take 1 tablet by mouth daily   clopidogrel (PLAVIX) 75 mg tablet 9/18/2023 Self No Yes   Sig: Take 1 tablet (75 mg total) by mouth daily   furosemide (LASIX) 40 mg tablet 9/18/2023 Self Yes Yes   Sig: Take 40 mg by mouth daily   gabapentin (NEURONTIN) 300 mg capsule  Self No No   Sig: Take 1 capsule (300 mg total) by mouth 2 (two) times a day   gabapentin (NEURONTIN) 300 mg capsule 9/18/2023  No Yes   Sig: Take 2 capsules (600 mg total) by mouth daily at bedtime   latanoprost (XALATAN) 0.005 % ophthalmic solution 9/18/2023 Self Yes Yes   Sig: Administer 1 drop to both eyes daily at bedtime   melatonin 3 mg 9/17/2023 Self Yes Yes   Sig: Take 3 mg by mouth daily at bedtime as needed   pantoprazole (PROTONIX) 40 mg tablet 9/18/2023  No Yes   Sig: TAKE 1 TABLET TWICE DAILY 30 MINUTES BEFORE BREAKFAST AND DINNER.   polyethylene glycol (MIRALAX) 17 g packet Unknown  Yes No   Sig: Take 17 g by mouth daily as needed   potassium chloride (Klor-Con) 10 mEq tablet Unknown  Yes No   Sig: Take 10 mEq by mouth 2 (two) times a day   predniSONE 20 mg tablet   No No   Sig: Take 2 tablets (40 mg total) by mouth daily   Patient not taking: Reported on 8/28/2023   propranolol (INDERAL LA) 60 mg 24 hr capsule Unknown  Yes No   Sig: Take 60 mg by mouth daily   senna-docusate sodium (SENOKOT S) 8.6-50 mg per tablet Unknown  Yes No   Sig: Take 2 tablets by mouth daily at bedtime   sertraline (ZOLOFT) 25 mg tablet Unknown  Yes No   Sig: Take 25 mg by mouth daily at bedtime      Facility-Administered Medications: None       Past Medical History:   Diagnosis Date   • Acute laryngitis    • Acute nonsuppurative otitis media, unspecified laterality    • Arm weakness    • Arthritis    • 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HCC)    • Bladder infection    • Bronchitis    • Constipation    • Cough    • Diabetes mellitus (HCC)    • Dizziness    • Dysfunction of eustachian tube    • Erectile dysfunction of non-organic origin    • Fatigue    • Glaucoma    • Hiatal hernia    • Hypertension    • Imbalance    • Leg muscle spasm    • MS (multiple sclerosis) (HCC)    • Nephrolithiasis    • Neurogenic bladder    • No natural teeth    • Sinus pain    • Spinal stenosis    • Strain of thoracic region    • Stroke [facility])    • Suprapubic catheter [facility])        Past Surgical History:   Procedure Laterality Date   • APPENDECTOMY     • BRAIN SURGERY      Coil placed in aneurysm   • CYSTOSCOPY     • CYSTOSCOPY     • CYSTOSCOPY  2018   • CYSTOSCOPY  01/15/2021   • EYE SURGERY      transscleral cyclophotocoagulation noncontact YAG laser   • MYRINGOTOMY      with ventilation tube insertion   • IN LITHOLAPAXY SMPL/SM <2.5 CM N/A 2019    Procedure: CYSTOSCOPY, holmium laser litholapaxy of bladder stones, EXCHANGE OF SP TUBE;  Surgeon: Jl Chaves MD;  Location: [facility];  Service: Urology   • SUPRAPUBIC CATHETER INSERTION         Family History   Problem Relation Age of Onset   • Heart attack Mother    • Stroke Mother    • Heart attack Father    • Anuerysm Father         In Stomach      I have reviewed and agree with the history as documented. E-Cigarette/Vaping   • E-Cigarette Use Never User      E-Cigarette/Vaping Substances   • Nicotine No    • THC No    • CBD No    • Flavoring No    • Other No    • Unknown No      Social History     Tobacco Use   • Smoking status: Former     Packs/day: 0.50     Years: 31.00     Total pack years: 15.50     Types: Cigarettes     Start date:      Quit date:      Years since quittin.7   • Smokeless tobacco: Never   Vaping Use   • Vaping Use: Never used   Substance Use Topics   • Alcohol use: Not Currently   • Drug use: No       Review of Systems   Musculoskeletal: Positive for arthralgias (Left hip pain). All other systems reviewed and are negative. Physical Exam  Physical Exam  Vitals reviewed. Constitutional:       General: He is not in acute distress. Appearance: He is well-developed.  He is not toxic-appearing or diaphoretic. HENT:      Head: Normocephalic and atraumatic. Right Ear: External ear normal.      Left Ear: External ear normal.      Nose: Nose normal.      Mouth/Throat:      Pharynx: Oropharynx is clear. Eyes:      Extraocular Movements: Extraocular movements intact. Pupils: Pupils are equal, round, and reactive to light. Cardiovascular:      Rate and Rhythm: Normal rate and regular rhythm. Heart sounds: Normal heart sounds. Pulmonary:      Effort: Pulmonary effort is normal. No respiratory distress. Breath sounds: Normal breath sounds. Abdominal:      General: There is no distension. Palpations: Abdomen is soft. Tenderness: There is abdominal tenderness (Diffuse, worse left lower quadrant). There is no guarding or rebound. Comments: Suprapubic cath in place   Musculoskeletal:      Cervical back: Normal range of motion and neck supple. Right hip: Tenderness present. Decreased strength. Left hip: Tenderness present. No deformity or crepitus. Decreased range of motion (Pain with active motion; passive range of motion intact without pain). Decreased strength. Comments: Weakness with flexion of bilateral hips, sensation intact distally   Skin:     General: Skin is warm and dry. Capillary Refill: Capillary refill takes less than 2 seconds. Coloration: Skin is not pale. Findings: No erythema or rash. Neurological:      Mental Status: He is alert and oriented to person, place, and time. Cranial Nerves: No cranial nerve deficit. Sensory: No sensory deficit. Psychiatric:         Speech: Speech normal.         Behavior: Behavior is cooperative.          Vital Signs  ED Triage Vitals [09/18/23 1803]   Temperature Pulse Respirations Blood Pressure SpO2   97.5 °F (36.4 °C) 83 18 119/60 96 %      Temp Source Heart Rate Source Patient Position - Orthostatic VS BP Location FiO2 (%)   Oral Monitor Lying Right arm --      Pain Score       6 Vitals:    09/18/23 2115 09/18/23 2145 09/18/23 2313 09/19/23 0614   BP: 120/57 123/59 134/58 115/54   Pulse: 86 84 83 93   Patient Position - Orthostatic VS: Lying Lying  Lying         Visual Acuity      ED Medications  Medications   amLODIPine (NORVASC) tablet 10 mg (10 mg Oral Given 9/19/23 0910)     And   atorvastatin (LIPITOR) tablet 80 mg (has no administration in time range)   clopidogrel (PLAVIX) tablet 75 mg (75 mg Oral Given 9/19/23 0910)   furosemide (LASIX) tablet 40 mg (40 mg Oral Given 9/19/23 0910)   gabapentin (NEURONTIN) capsule 300 mg (300 mg Oral Given 9/19/23 0910)   gabapentin (NEURONTIN) capsule 600 mg (600 mg Oral Given 9/19/23 0028)   latanoprost (XALATAN) 0.005 % ophthalmic solution 1 drop (1 drop Both Eyes Not Given 9/19/23 0153)   propranolol (INDERAL LA) 24 hr capsule 60 mg (60 mg Oral Given 9/19/23 0910)   senna-docusate sodium (SENOKOT S) 8.6-50 mg per tablet 2 tablet (2 tablets Oral Given 9/19/23 0028)   sertraline (ZOLOFT) tablet 25 mg (25 mg Oral Given 9/19/23 0028)   albuterol (PROVENTIL HFA,VENTOLIN HFA) inhaler 2 puff (has no administration in time range)   ALPRAZolam (XANAX) tablet 0.25 mg (has no administration in time range)   melatonin tablet 3 mg (has no administration in time range)   polyethylene glycol (MIRALAX) packet 17 g (has no administration in time range)   pantoprazole (PROTONIX) EC tablet 40 mg (40 mg Oral Given 9/19/23 0616)   acetaminophen (TYLENOL) tablet 650 mg (has no administration in time range)   polyethylene glycol (MIRALAX) packet 17 g (17 g Oral Given 9/19/23 0909)   ondansetron (ZOFRAN) injection 4 mg (has no administration in time range)   aluminum-magnesium hydroxide-simethicone (MAALOX) oral suspension 30 mL (has no administration in time range)   enoxaparin (LOVENOX) subcutaneous injection 40 mg (40 mg Subcutaneous Given 9/19/23 6624)   insulin lispro (HumaLOG) 100 units/mL subcutaneous injection 1-6 Units (1 Units Subcutaneous Given 9/19/23 1206)   iohexol (OMNIPAQUE) 350 MG/ML injection (SINGLE-DOSE) 100 mL (100 mL Intravenous Given 9/18/23 1953)       Diagnostic Studies  Results Reviewed     Procedure Component Value Units Date/Time    Comprehensive metabolic panel [[id number]]  (Abnormal) Collected: 09/18/23 1856    Lab Status: Final result Specimen: Blood from Arm, Right Updated: 09/18/23 1922     Sodium 129 mmol/L      Potassium 4.9 mmol/L      Chloride 96 mmol/L      CO2 25 mmol/L      ANION GAP 8 mmol/L      BUN 15 mg/dL      Creatinine 1.14 mg/dL      Glucose 171 mg/dL      Calcium 10.0 mg/dL      AST 20 U/L      ALT 24 U/L      Alkaline Phosphatase 96 U/L      Total Protein 7.7 g/dL      Albumin 4.0 g/dL      Total Bilirubin 0.55 mg/dL      eGFR 63 ml/min/1.73sq m     Narrative:      [facility] guidelines for Chronic Kidney Disease (CKD):   •  Stage 1 with normal or high GFR (GFR > 90 mL/min/1.73 square meters)  •  Stage 2 Mild CKD (GFR = 60-89 mL/min/1.73 square meters)  •  Stage 3A Moderate CKD (GFR = 45-59 mL/min/1.73 square meters)  •  Stage 3B Moderate CKD (GFR = 30-44 mL/min/1.73 square meters)  •  Stage 4 Severe CKD (GFR = 15-29 mL/min/1.73 square meters)  •  Stage 5 End Stage CKD (GFR <15 mL/min/1.73 square meters)  Note: GFR calculation is accurate only with a steady state creatinine    Lipase [[id number]]  (Normal) Collected: 09/18/23 1856    Lab Status: Final result Specimen: Blood from Arm, Right Updated: 09/18/23 1922     Lipase 24 u/L     CBC and differential [[id number]]  (Abnormal) Collected: 09/18/23 1856    Lab Status: Final result Specimen: Blood from Arm, Right Updated: 09/18/23 1905     WBC 15.89 Thousand/uL      RBC 4.95 Million/uL      Hemoglobin 14.3 g/dL      Hematocrit 43.6 %      MCV 88 fL      MCH 28.9 pg      MCHC 32.8 g/dL      RDW 13.2 %      MPV 8.2 fL      Platelets 723 Thousands/uL      nRBC 0 /100 WBCs      Neutrophils Relative 70 %      Immat GRANS % 1 %      Lymphocytes Relative 18 % Monocytes Relative 6 %      Eosinophils Relative 4 %      Basophils Relative 1 %      Neutrophils Absolute 11.30 Thousands/µL      Immature Grans Absolute 0.08 Thousand/uL      Lymphocytes Absolute 2.89 Thousands/µL      Monocytes Absolute 0.93 Thousand/µL      Eosinophils Absolute 0.58 Thousand/µL      Basophils Absolute 0.11 Thousands/µL                  XR femur 2 vw left   Final Result by Mary Ann Montoya DO (09/19 1313)      No acute osseous abnormality. Degenerative changes as described. Workstation performed: [id number]         CT abdomen pelvis with contrast   Final Result by Jensen Roger MD (09/18 2128)      Findings above compatible with resolving pyelonephritis and cystitis, overall improved in appearance compared to the prior recent examinations. Recommend clinical correlation and  follow-up. Persistent significant fecal retention with large amount of stool within the rectal vault. Correlate for fecal impaction. Workstation performed: [id number]                    Procedures  Procedures         ED Course  ED Course as of 09/19/23 1445   Mon Sep 18, 2023   1907 WBC(!): 15.89  Elevated. Non-diagnostic. Awaiting further w/u.    1907 CBC and differential(!)  Reviewed and without actionable derangement. 1936 Sodium(!): 129  Mildly worsened hyponatremia. Unlikely to be causing symptoms. 1936 Lipase: 24  WNL   2047 Awaiting CT read. 2131 CT abdomen pelvis with contrast  Findings above compatible with resolving pyelonephritis and cystitis, overall improved in appearance compared to the prior recent examinations. Persistent significant fecal retention with large amount of stool within the rectal vault. 2135 Reassessed pt who reports no further pain. States no complaints at this time. Discussed all results. Pt states he had a BM yesterday and has had no difficulty with BMs that would correlate with fecal impaction.  Discussed with pt that we will get him up and make sure he can transfer to a wheelchair and, if he is able to, will get him home. Pt agreeable to plan. Nursing made aware to attempt transfer. 2209 Per nursing, even with 3 people, pt had significant difficulty transferring. Discussed options with pt who initially requested discharge but was inevitably agreeable to admission after discussion that if he is unable to transfer here, he will lively be unable to transfer at home. 2213 [facility] contacted for admission. SBIRT 20yo+    Flowsheet Row Most Recent Value   Initial Alcohol Screen: US AUDIT-C     1. How often do you have a drink containing alcohol? 0 Filed at: 09/18/2023 1803   2. How many drinks containing alcohol do you have on a typical day you are drinking? 0 Filed at: 09/18/2023 1803   3b. FEMALE Any Age, or MALE 65+: How often do you have 4 or more drinks on one occassion? 0 Filed at: 09/18/2023 1803   Audit-C Score 0 Filed at: 09/18/2023 1803   ALPESH: How many times in the past year have you. .. Used an illegal drug or used a prescription medication for non-medical reasons? Never Filed at: 09/18/2023 1803                    Medical Decision Making  Pt is a 65yo M who presents with left hip pain. Exam pertinent for left hip and abdominal tenderness. Differential diagnosis to include but not limited to occult fracture, soft tissue injury, deconditioning, intra-abdominal pathology, electrolyte abnormality. Will obtain basic labs as well as CT abdomen pelvis due to abdominal tenderness. CT abdomen pelvis will show left hip and pelvis for possible osseous abnormalities. Patient stating no pain medications needed at this time. See ED course for results and details. Plan to admit pt to [facility]. Pt discussed with admitting team and admission orders placed. Pt admitted without incident. Amount and/or Complexity of Data Reviewed  Labs: ordered. Decision-making details documented in ED Course.   Radiology: ordered. Decision-making details documented in ED Course. Risk  Prescription drug management. Decision regarding hospitalization. Disposition  Final diagnoses:   Left hip pain   Leg weakness     Time reflects when diagnosis was documented in both MDM as applicable and the Disposition within this note     Time User Action Codes Description Comment    9/18/2023 10:14 PM Jasmina  Add [M25.552] Left hip pain     9/18/2023 10:14 PM Jasmina  Add [J34.469] Leg weakness       ED Disposition     ED Disposition   Admit    Condition   Stable    Date/Time   Mon Sep 18, 2023 10:25 PM    Comment   Case was discussed with LISSETTE and the patient's admission status was agreed to be Admission Status: observation status to the service of Dr. Arlette Berg. Follow-up Information    None         Current Discharge Medication List      CONTINUE these medications which have NOT CHANGED    Details   albuterol (2.5 mg/3 mL) 0.083 % nebulizer solution Take 2.5 mg by nebulization every 6 (six) hours as needed for wheezing or shortness of breath      albuterol (PROVENTIL HFA,VENTOLIN HFA) 90 mcg/act inhaler Inhale 2 puffs every 6 (six) hours as needed for wheezing      ALPRAZolam (XANAX) 0.25 mg tablet Take 0.25 mg by mouth 2 (two) times a day as needed for anxiety or sleep       amLODIPine-atorvastatin (CADUET) 10-80 MG per tablet Take 1 tablet by mouth daily      clopidogrel (PLAVIX) 75 mg tablet Take 1 tablet (75 mg total) by mouth daily  Refills: 0    Associated Diagnoses: Chronic suprapubic catheter ([address]);  Neurogenic bladder; Cellulitis      Dulaglutide (Trulicity) 0.64 VG/2.4QB SOPN Inject 0.75 mg under the skin once a week      Ergocalciferol (VITAMIN D2 PO) Take 50,000 Units by mouth once a week      furosemide (LASIX) 40 mg tablet Take 40 mg by mouth daily      !! gabapentin (NEURONTIN) 300 mg capsule Take 2 capsules (600 mg total) by mouth daily at bedtime  Qty: 30 capsule, Refills: 0    Associated Diagnoses: Multiple sclerosis ([address])      latanoprost (XALATAN) 0.005 % ophthalmic solution Administer 1 drop to both eyes daily at bedtime      melatonin 3 mg Take 3 mg by mouth daily at bedtime as needed      pantoprazole (PROTONIX) 40 mg tablet TAKE 1 TABLET TWICE DAILY 30 MINUTES BEFORE BREAKFAST AND DINNER. Qty: 180 tablet, Refills: 1    Associated Diagnoses: Gastroesophageal reflux disease without esophagitis      !! gabapentin (NEURONTIN) 300 mg capsule Take 1 capsule (300 mg total) by mouth 2 (two) times a day  Qty: 60 capsule, Refills: 0    Associated Diagnoses: Multiple sclerosis (HCC)      polyethylene glycol (MIRALAX) 17 g packet Take 17 g by mouth daily as needed      potassium chloride (Klor-Con) 10 mEq tablet Take 10 mEq by mouth 2 (two) times a day      predniSONE 20 mg tablet Take 2 tablets (40 mg total) by mouth daily  Qty: 8 tablet, Refills: 0    Associated Diagnoses: COPD with acute exacerbation (HCC)      propranolol (INDERAL LA) 60 mg 24 hr capsule Take 60 mg by mouth daily      senna-docusate sodium (SENOKOT S) 8.6-50 mg per tablet Take 2 tablets by mouth daily at bedtime      sertraline (ZOLOFT) 25 mg tablet Take 25 mg by mouth daily at bedtime       !! - Potential duplicate medications found. Please discuss with provider. No discharge procedures on file.     PDMP Review       Value Time User    PDMP Reviewed  Yes 8/19/2023  1:22 AM Ally West, [address] [place] [address] Provider  Electronically Signed by           Trell Delgado MD  09/19/23 7923

## 2023-09-19 ENCOUNTER — APPOINTMENT (OUTPATIENT)
Dept: RADIOLOGY | Facility: HOSPITAL | Age: 73
End: 2023-09-19
Payer: MEDICARE

## 2023-09-19 PROBLEM — R26.2 AMBULATORY DYSFUNCTION: Status: ACTIVE | Noted: 2023-09-19

## 2023-09-19 PROBLEM — M25.552 LEFT HIP PAIN: Status: ACTIVE | Noted: 2023-09-19

## 2023-09-19 LAB
GLUCOSE SERPL-MCNC: 106 MG/DL (ref 65–140)
GLUCOSE SERPL-MCNC: 165 MG/DL (ref 65–140)
GLUCOSE SERPL-MCNC: 167 MG/DL (ref 65–140)
GLUCOSE SERPL-MCNC: 210 MG/DL (ref 65–140)

## 2023-09-19 PROCEDURE — 97167 OT EVAL HIGH COMPLEX 60 MIN: CPT

## 2023-09-19 PROCEDURE — 73552 X-RAY EXAM OF FEMUR 2/>: CPT

## 2023-09-19 PROCEDURE — 99222 1ST HOSP IP/OBS MODERATE 55: CPT | Performed by: INTERNAL MEDICINE

## 2023-09-19 PROCEDURE — 97163 PT EVAL HIGH COMPLEX 45 MIN: CPT

## 2023-09-19 PROCEDURE — 82948 REAGENT STRIP/BLOOD GLUCOSE: CPT

## 2023-09-19 RX ORDER — INSULIN LISPRO 100 [IU]/ML
1-6 INJECTION, SOLUTION INTRAVENOUS; SUBCUTANEOUS
Status: DISCONTINUED | OUTPATIENT
Start: 2023-09-19 | End: 2023-09-20 | Stop reason: HOSPADM

## 2023-09-19 RX ORDER — INSULIN LISPRO 100 [IU]/ML
1-6 INJECTION, SOLUTION INTRAVENOUS; SUBCUTANEOUS
Status: DISCONTINUED | OUTPATIENT
Start: 2023-09-19 | End: 2023-09-19

## 2023-09-19 RX ORDER — PANTOPRAZOLE SODIUM 40 MG/1
40 TABLET, DELAYED RELEASE ORAL
Status: DISCONTINUED | OUTPATIENT
Start: 2023-09-19 | End: 2023-09-20 | Stop reason: HOSPADM

## 2023-09-19 RX ORDER — AMOXICILLIN 250 MG
2 CAPSULE ORAL
Status: DISCONTINUED | OUTPATIENT
Start: 2023-09-19 | End: 2023-09-20 | Stop reason: HOSPADM

## 2023-09-19 RX ORDER — LATANOPROST 50 UG/ML
1 SOLUTION/ DROPS OPHTHALMIC
Status: DISCONTINUED | OUTPATIENT
Start: 2023-09-19 | End: 2023-09-20 | Stop reason: HOSPADM

## 2023-09-19 RX ORDER — GABAPENTIN 300 MG/1
300 CAPSULE ORAL 2 TIMES DAILY
Status: DISCONTINUED | OUTPATIENT
Start: 2023-09-19 | End: 2023-09-20 | Stop reason: HOSPADM

## 2023-09-19 RX ORDER — ONDANSETRON 2 MG/ML
4 INJECTION INTRAMUSCULAR; INTRAVENOUS EVERY 6 HOURS PRN
Status: DISCONTINUED | OUTPATIENT
Start: 2023-09-19 | End: 2023-09-20 | Stop reason: HOSPADM

## 2023-09-19 RX ORDER — ENOXAPARIN SODIUM 100 MG/ML
40 INJECTION SUBCUTANEOUS DAILY
Status: DISCONTINUED | OUTPATIENT
Start: 2023-09-19 | End: 2023-09-20 | Stop reason: HOSPADM

## 2023-09-19 RX ORDER — ACETAMINOPHEN 325 MG/1
650 TABLET ORAL EVERY 6 HOURS PRN
Status: DISCONTINUED | OUTPATIENT
Start: 2023-09-19 | End: 2023-09-20 | Stop reason: HOSPADM

## 2023-09-19 RX ORDER — LANOLIN ALCOHOL/MO/W.PET/CERES
3 CREAM (GRAM) TOPICAL
Status: DISCONTINUED | OUTPATIENT
Start: 2023-09-19 | End: 2023-09-20 | Stop reason: HOSPADM

## 2023-09-19 RX ORDER — GABAPENTIN 300 MG/1
600 CAPSULE ORAL
Status: DISCONTINUED | OUTPATIENT
Start: 2023-09-19 | End: 2023-09-20 | Stop reason: HOSPADM

## 2023-09-19 RX ORDER — POLYETHYLENE GLYCOL 3350 17 G/17G
17 POWDER, FOR SOLUTION ORAL DAILY
Status: DISCONTINUED | OUTPATIENT
Start: 2023-09-19 | End: 2023-09-20 | Stop reason: HOSPADM

## 2023-09-19 RX ORDER — SERTRALINE HYDROCHLORIDE 25 MG/1
25 TABLET, FILM COATED ORAL
Status: DISCONTINUED | OUTPATIENT
Start: 2023-09-19 | End: 2023-09-20 | Stop reason: HOSPADM

## 2023-09-19 RX ORDER — ALBUTEROL SULFATE 90 UG/1
2 AEROSOL, METERED RESPIRATORY (INHALATION) EVERY 6 HOURS PRN
Status: DISCONTINUED | OUTPATIENT
Start: 2023-09-19 | End: 2023-09-20 | Stop reason: HOSPADM

## 2023-09-19 RX ORDER — FUROSEMIDE 40 MG/1
40 TABLET ORAL DAILY
Status: DISCONTINUED | OUTPATIENT
Start: 2023-09-19 | End: 2023-09-20 | Stop reason: HOSPADM

## 2023-09-19 RX ORDER — MAGNESIUM HYDROXIDE/ALUMINUM HYDROXICE/SIMETHICONE 120; 1200; 1200 MG/30ML; MG/30ML; MG/30ML
30 SUSPENSION ORAL EVERY 6 HOURS PRN
Status: DISCONTINUED | OUTPATIENT
Start: 2023-09-19 | End: 2023-09-20 | Stop reason: HOSPADM

## 2023-09-19 RX ORDER — PROPRANOLOL HCL 60 MG
60 CAPSULE, EXTENDED RELEASE 24HR ORAL DAILY
Status: DISCONTINUED | OUTPATIENT
Start: 2023-09-19 | End: 2023-09-20 | Stop reason: HOSPADM

## 2023-09-19 RX ORDER — AMLODIPINE BESYLATE 10 MG/1
10 TABLET ORAL DAILY
Status: DISCONTINUED | OUTPATIENT
Start: 2023-09-19 | End: 2023-09-20 | Stop reason: HOSPADM

## 2023-09-19 RX ORDER — ATORVASTATIN CALCIUM 80 MG/1
80 TABLET, FILM COATED ORAL
Status: DISCONTINUED | OUTPATIENT
Start: 2023-09-19 | End: 2023-09-20 | Stop reason: HOSPADM

## 2023-09-19 RX ORDER — CLOPIDOGREL BISULFATE 75 MG/1
75 TABLET ORAL DAILY
Status: DISCONTINUED | OUTPATIENT
Start: 2023-09-19 | End: 2023-09-20 | Stop reason: HOSPADM

## 2023-09-19 RX ORDER — POLYETHYLENE GLYCOL 3350 17 G/17G
17 POWDER, FOR SOLUTION ORAL DAILY PRN
Status: DISCONTINUED | OUTPATIENT
Start: 2023-09-19 | End: 2023-09-20 | Stop reason: HOSPADM

## 2023-09-19 RX ORDER — ALPRAZOLAM 0.25 MG/1
0.25 TABLET ORAL 2 TIMES DAILY PRN
Status: DISCONTINUED | OUTPATIENT
Start: 2023-09-19 | End: 2023-09-20 | Stop reason: HOSPADM

## 2023-09-19 RX ADMIN — POLYETHYLENE GLYCOL 3350 17 G: 17 POWDER, FOR SOLUTION ORAL at 09:09

## 2023-09-19 RX ADMIN — SERTRALINE HYDROCHLORIDE 25 MG: 25 TABLET ORAL at 00:28

## 2023-09-19 RX ADMIN — PANTOPRAZOLE SODIUM 40 MG: 40 TABLET, DELAYED RELEASE ORAL at 06:16

## 2023-09-19 RX ADMIN — FUROSEMIDE 40 MG: 40 TABLET ORAL at 09:10

## 2023-09-19 RX ADMIN — PANTOPRAZOLE SODIUM 40 MG: 40 TABLET, DELAYED RELEASE ORAL at 16:14

## 2023-09-19 RX ADMIN — GABAPENTIN 600 MG: 300 CAPSULE ORAL at 00:28

## 2023-09-19 RX ADMIN — INSULIN LISPRO 1 UNITS: 100 INJECTION, SOLUTION INTRAVENOUS; SUBCUTANEOUS at 16:15

## 2023-09-19 RX ADMIN — PROPRANOLOL HYDROCHLORIDE 60 MG: 60 CAPSULE, EXTENDED RELEASE ORAL at 09:10

## 2023-09-19 RX ADMIN — ATORVASTATIN CALCIUM 80 MG: 80 TABLET, FILM COATED ORAL at 16:14

## 2023-09-19 RX ADMIN — SENNOSIDES AND DOCUSATE SODIUM 2 TABLET: 50; 8.6 TABLET ORAL at 00:28

## 2023-09-19 RX ADMIN — GABAPENTIN 600 MG: 300 CAPSULE ORAL at 22:12

## 2023-09-19 RX ADMIN — INSULIN LISPRO 2 UNITS: 100 INJECTION, SOLUTION INTRAVENOUS; SUBCUTANEOUS at 22:12

## 2023-09-19 RX ADMIN — ENOXAPARIN SODIUM 40 MG: 40 INJECTION SUBCUTANEOUS at 09:10

## 2023-09-19 RX ADMIN — AMLODIPINE BESYLATE 10 MG: 10 TABLET ORAL at 09:10

## 2023-09-19 RX ADMIN — SERTRALINE HYDROCHLORIDE 25 MG: 25 TABLET ORAL at 22:12

## 2023-09-19 RX ADMIN — LATANOPROST 1 DROP: 50 SOLUTION OPHTHALMIC at 22:12

## 2023-09-19 RX ADMIN — INSULIN LISPRO 1 UNITS: 100 INJECTION, SOLUTION INTRAVENOUS; SUBCUTANEOUS at 12:06

## 2023-09-19 RX ADMIN — CLOPIDOGREL BISULFATE 75 MG: 75 TABLET ORAL at 09:10

## 2023-09-19 RX ADMIN — Medication 3 MG: at 22:12

## 2023-09-19 RX ADMIN — SENNOSIDES AND DOCUSATE SODIUM 2 TABLET: 50; 8.6 TABLET ORAL at 22:11

## 2023-09-19 RX ADMIN — GABAPENTIN 300 MG: 300 CAPSULE ORAL at 17:04

## 2023-09-19 RX ADMIN — GABAPENTIN 300 MG: 300 CAPSULE ORAL at 09:10

## 2023-09-19 NOTE — PLAN OF CARE
Problem: PHYSICAL THERAPY ADULT  Goal: Performs mobility at highest level of function for planned discharge setting. See evaluation for individualized goals. Description: Treatment/Interventions: LE strengthening/ROM, Therapeutic exercise, Cognitive reorientation, Patient/family training, Equipment eval/education, Bed mobility, Compensatory technique education, Spoke to nursing, OT          See flowsheet documentation for full assessment, interventions and recommendations. Note: Prognosis: Fair  Problem List: Decreased strength, Impaired balance, Decreased mobility, Decreased cognition, Impaired tone  Assessment: Dee Reese is a 75 y.o. male admitted to [facility] on 9/18/2023 for <principal problem not specified>. PT was consulted and pt was seen on 9/19/2023 for mobility assessment and d/c planning. Pt presents w readmission, medium fall risk, contact isolation, multiple lines. At baseline pt gets A for ADLs and mobility. Family uses sit to stand device to transf pt to wc. Pt is currently functioning at a max Ax1-2 for bed mobility including supine<>sit transf. Able to hold himself at [address] for support. Occ LOB when attempting dynamic seated tasks. Pt decline standing transf at this time. Appears to be functioning at baseline but may benefit continued therapy to improve bed mobility technique, seated balance, transf technique w device and wc propulsion. At this time PT recommendations for d/c are home w continued family support. May benefit from [address] as pt reports ? difficulty w wc propulsion and transf w use of device. Barriers to Discharge: None     PT Discharge Recommendation: Home with home health rehabilitation (?HHPT)    See flowsheet documentation for full assessment.

## 2023-09-19 NOTE — ASSESSMENT & PLAN NOTE
Lab Results   Component Value Date    HGBA1C 10.6 (H) 09/05/2023       No results for input(s): "POCGLU" in the last 72 hours.     Blood Sugar Average: Last 72 hrs:     Plan:  • BG goal 140-200 while inpatient, ACHS sliding scale, carb coverage diet, holding oral antihyperglycemics  • Long acting: none  • PTA regimen: trulicity

## 2023-09-19 NOTE — ASSESSMENT & PLAN NOTE
Wt Readings from Last 3 Encounters:   09/18/23 76.9 kg (169 lb 8.5 oz)   08/28/23 86.2 kg (190 lb)   08/19/23 72.3 kg (159 lb 6.3 oz)     Appears euvolemic    Plan:  • Continue PTA propranolol/amlodipine

## 2023-09-19 NOTE — ASSESSMENT & PLAN NOTE
Reported recent "slide" off his chair onto his buttock resulting in hip pain, limited active ROM due to pain, passive ROM intact.  CT abd/pelvis without acute pelvic pathology, obtain dedicated hip XR

## 2023-09-19 NOTE — PLAN OF CARE
Problem: Potential for Falls  Goal: Patient will remain free of falls  Description: INTERVENTIONS:  - Educate patient/family on patient safety including physical limitations  - Instruct patient to call for assistance with activity   - Consult OT/PT to assist with strengthening/mobility   - Keep Call bell within reach  - Keep bed low and locked with side rails adjusted as appropriate  - Keep care items and personal belongings within reach  - Initiate and maintain comfort rounds  - Make Fall Risk Sign visible to staff  - Offer Toileting every  Hours, in advance of need  - Initiate/Maintain alarm  - Obtain necessary fall risk management equipment: - Apply yellow socks and bracelet for high fall risk patients  - Consider moving patient to room near nurses station  Outcome: Progressing     Problem: MOBILITY - ADULT  Goal: Maintain or return to baseline ADL function  Description: INTERVENTIONS:  -  Assess patient's ability to carry out ADLs; assess patient's baseline for ADL function and identify physical deficits which impact ability to perform ADLs (bathing, care of mouth/teeth, toileting, grooming, dressing, etc.)  - Assess/evaluate cause of self-care deficits   - Assess range of motion  - Assess patient's mobility; develop plan if impaired  - Assess patient's need for assistive devices and provide as appropriate  - Encourage maximum independence but intervene and supervise when necessary  - Involve family in performance of ADLs  - Assess for home care needs following discharge   - Consider OT consult to assist with ADL evaluation and planning for discharge  - Provide patient education as appropriate  Outcome: Progressing  Goal: Maintains/Returns to pre admission functional level  Description: INTERVENTIONS:  - Perform BMAT or MOVE assessment daily.   - Set and communicate daily mobility goal to care team and patient/family/caregiver.    - Collaborate with rehabilitation services on mobility goals if consulted  - Perform Range of Motion  times a day. - Reposition patient every  hours.   - Dangle patient  times a day  - Stand patient times a day  - Ambulate patient  times a day  - Out of bed to chair times a day   - Out of bed for meals  times a day  - Out of bed for toileting  - Record patient progress and toleration of activity level   Outcome: Progressing     Problem: Prexisting or High Potential for Compromised Skin Integrity  Goal: Skin integrity is maintained or improved  Description: INTERVENTIONS:  - Identify patients at risk for skin breakdown  - Assess and monitor skin integrity  - Assess and monitor nutrition and hydration status  - Monitor labs   - Assess for incontinence   - Turn and reposition patient  - Assist with mobility/ambulation  - Relieve pressure over bony prominences  - Avoid friction and shearing  - Provide appropriate hygiene as needed including keeping skin clean and dry  - Evaluate need for skin moisturizer/barrier cream  - Collaborate with interdisciplinary team   - Patient/family teaching  - Consider wound care consult   Outcome: Progressing     Problem: SKIN/TISSUE INTEGRITY - ADULT  Goal: Skin Integrity remains intact(Skin Breakdown Prevention)  Description: Assess:  -Perform Guille assessment every   -Clean and moisturize skin every   -Inspect skin when repositioning, toileting, and assisting with ADLS  -Assess under medical devices such as  every   -Assess extremities for adequate circulation and sensation     Bed Management:  -Have minimal linens on bed & keep smooth, unwrinkled  -Change linens as needed when moist or perspiring  -Avoid sitting or lying in one position for more than hours while in bed  -Keep HOB at degrees     Toileting:  -Offer bedside commode  -Assess for incontinence every   -Use incontinent care products after each incontinent episode such as     Activity:  -Mobilize patient  times a day  -Encourage activity and walks on unit  -Encourage or provide ROM exercises   -Turn and reposition patient every  Hours  -Use appropriate equipment to lift or move patient in bed  -Instruct/ Assist with weight shifting every  when out of bed in chair  -Consider limitation of chair time  hour intervals    Skin Care:  -Avoid use of baby powder, tape, friction and shearing, hot water or constrictive clothing  -Relieve pressure over bony prominences using -Do not massage red bony areas    Next Steps:  -Teach patient strategies to minimize risks such as    -Consider consults to  interdisciplinary teams such as   Outcome: Progressing  Goal: Incision(s), wounds(s) or drain site(s) healing without S/S of infection  Description: INTERVENTIONS  - Assess and document dressing, incision, wound bed, drain sites and surrounding tissue  - Provide patient and family education  - Perform skin care/dressing changes every   Outcome: Progressing  Goal: Pressure injury heals and does not worsen  Description: Interventions:  - Implement low air loss mattress or specialty surface (Criteria met)  - Apply silicone foam dressing  - Instruct/assist with weight shifting every  minutes when in chair   - Limit chair time to  hour intervals  - Use special pressure reducing interventions such as  when in chair   - Apply fecal or urinary incontinence containment device   - Perform passive or active ROM every   - Turn and reposition patient & offload bony prominences every hours   - Utilize friction reducing device or surface for transfers   - Consider consults to  interdisciplinary teams such as   - Use incontinent care products after each incontinent episode such as - Consider nutrition services referral as needed  Outcome: Progressing     Problem: MUSCULOSKELETAL - ADULT  Goal: Maintain or return mobility to safest level of function  Description: INTERVENTIONS:  - Assess patient's ability to carry out ADLs; assess patient's baseline for ADL function and identify physical deficits which impact ability to perform ADLs (bathing, care of mouth/teeth, toileting, grooming, dressing, etc.)  - Assess/evaluate cause of self-care deficits   - Assess range of motion  - Assess patient's mobility  - Assess patient's need for assistive devices and provide as appropriate  - Encourage maximum independence but intervene and supervise when necessary  - Involve family in performance of ADLs  - Assess for home care needs following discharge   - Consider OT consult to assist with ADL evaluation and planning for discharge  - Provide patient education as appropriate  Outcome: Progressing  Goal: Maintain proper alignment of affected body part  Description: INTERVENTIONS:  - Support, maintain and protect limb and body alignment  - Provide patient/ family with appropriate education  Outcome: Progressing

## 2023-09-19 NOTE — PLAN OF CARE
Problem: OCCUPATIONAL THERAPY ADULT  Goal: Performs self-care activities at highest level of function for planned discharge setting. See evaluation for individualized goals. Description: Treatment Interventions: ADL retraining, Functional transfer training, UE strengthening/ROM, Endurance training, Cognitive reorientation, Patient/family training, Compensatory technique education, Continued evaluation, Energy conservation, Activityengagement, Neuromuscular reeducation          See flowsheet documentation for full assessment, interventions and recommendations. Note: Limitation: Decreased ADL status, Decreased UE ROM, Decreased UE strength, Decreased Safe judgement during ADL, Decreased endurance, Decreased self-care trans, Decreased high-level ADLs  Prognosis: Fair, Guarded  Assessment: Patient is a 75y.o. year old male seen for OT eval s/p admit to [facility] on 9/18/2023 with ambulatory dysfunction, L hip pain. Patient with active OT orders and activity orders for Up and OOB as tolerated . Personal factors affecting pt at time of IE include: difficulty performing ADLs and IADLs, difficulty with functional mobility/transfers. Upon evaluation, patient’s functional status as follows: eating: Kylah, grooming: Thuy, UB bathing: Thuy, LB bathing: maxA, UB dressing: modA, LB dressing: dependent, toileting: dependent; functional transfers: DNT, bed mobility: maxA and maxAx2, functional mobility: DNT, sitting/standing tolerance: ~5 min sitting EOB w/ unilateral-b/l UE support - due to the following deficits impacting occupational performance: weakness, decreased UE ROM, decreased strength , decreased balance, decreased activity tolerance, limited functional reach, impaired memory, decreased safety awareness, impaired coordination and spasticity .  These impairments, as well at pt’s difficulty performing ADLs, difficulty performing IADLs, difficulty performing transfers/mobility, limited insight into deficits, fall risk , functional decline  and advanced age limit pt’s ability to safely engage in all baseline areas of occupation. Patient would benefit from continued skilled OT therapy while in acute setting to address deficits as defined above and maximize (I) with ADLs and functional mobility. Occupational performance areas to address include: grooming, bathing/shower, dressing and functional mobility. Based on the aforementioned OT evaluation, functional performance deficits, and assessments, pt has been identified as a high complexity evaluation. From OT standpoint, recommend home with 24/7 S and HHOT upon D/C. OT will continue to follow pt 1-3x/wk to address the following goals to  w/in 10-14 days.      OT Discharge Recommendation: No rehabilitation needs

## 2023-09-19 NOTE — ED NOTES
Attempted to stand pt with 2 other staff member. Pt exremetely unsteady unable to hold self up in bed. Pt unable to turn and povit to chair. Leg started to buckle.  Pt report at home family has additional transfer aids, and help with nephew and brother     Nakia Velázquez RN  09/18/23 6449
Pt suprapubic catheter connected to drainage bag.   Per pt keeps clammed during day and intermittently drains urine     Suzanne Luis RN  09/18/23 2123
no

## 2023-09-19 NOTE — ASSESSMENT & PLAN NOTE
Longstanding MS since the 60's not maintained on DMD, wheelchair bound    Plan:  • Continue gabapentin

## 2023-09-19 NOTE — UTILIZATION REVIEW
Initial Clinical Review    Admission: Date/Time/Statement:   Admission Orders (From admission, onward)     Ordered        09/18/23 2225  Place in Observation  Once                      09/19/23 1717  Inpatient Admission  Once        Transfer Service: Hospitalist    Question Answer Comment   Level of Care Med Surg    Bed Type [place]    Estimated length of stay More than 2 Midnights    Certification I certify that inpatient services are medically necessary for this patient for a duration of greater than two midnights. See H&P and MD Progress Notes for additional information about the patient's course of treatment. 09/19/23 1716   OBSERVATION   9/18 @  2225 CHANGED TO IP ADMISSION  9/19 @  [phone]    ED Arrival Information     Expected   -    Arrival   9/18/2023 17:58    Acuity   Urgent            Means of arrival   Ambulance    Escorted by   [facility] ([address] [place] [address])    Service   Hospitalist    Admission type   Emergency            Arrival complaint   Hip Pain           Chief Complaint   Patient presents with   • Hip Pain     Patient reports increase in L hip pain and difficulty ambulating. Patient reports fell 1-2 weeks ago. Initial Presentation: 75 y.o. male presents to ED via  EMS  From home with left hip pain, difficulty moving from w/c. Recently hospitalized for sepsis/pyelo and recovering well. A week ago he slid off his chair onto his buttock/back and had been having hip pain since then. Mainly on his left side. Can move his leg with pain. Denies head strike/LOC. No numbness or tingling down legs. PMH  Is  MS,  W/C  Bound,  CVA, DM2, HTN, BREANA  And hyponatremia. Ct abd shows constipation. Admit  Observation with   Left hip pain,  Ambulatory dysfunction, constipation and plan is  PT/OT, X ray hip,  Bowel regimen, monitor labs and continue home meds. Date: 9/20    Day 3: Has surpassed a 2nd midnight with active treatments and services, which include .     D/C  home    ED Triage Vitals [09/18/23 1803]   Temperature Pulse Respirations Blood Pressure SpO2   97.5 °F (36.4 °C) 83 18 119/60 96 %      Temp Source Heart Rate Source Patient Position - Orthostatic VS BP Location FiO2 (%)   Oral Monitor Lying Right arm --      Pain Score       6          Wt Readings from Last 1 Encounters:   09/18/23 76.9 kg (169 lb 8.5 oz)     Additional Vital Signs:   98.2 °F (36.8 °C) 93 18 115/54 74 96 % None (Room air) Lying    09/18/23 23:13:14 97.2 °F (36.2 °C) Abnormal  83 -- 134/58 83 99 % -- --   09/18/23 2145 -- 84 -- 123/59 85 94 % None (Room air) Lying   09/18/23 2115 -- 86 16 120/57 82 93 % None (Room air) Lying   09/18/23 2015 -- 78 -- 126/60 -- 94 % None (Room air) Lying   09/18/23 1803 97.5 °F (36.4 °C) 83 18 119/60 -- 96 % None (Room air) Lying       Pertinent Labs/Diagnostic Test Results:   CT abdomen pelvis with contrast   Final Result by Yahaira Jamison MD (09/18 2128)      Findings above compatible with resolving pyelonephritis and cystitis, overall improved in appearance compared to the prior recent examinations. Recommend clinical correlation and  follow-up. Persistent significant fecal retention with large amount of stool within the rectal vault. Correlate for fecal impaction.             Workstation performed: [id number]         XR femur 2 vw left    (Results Pending)         Results from last 7 days   Lab Units 09/18/23  1856   WBC Thousand/uL 15.89*   HEMOGLOBIN g/dL 14.3   HEMATOCRIT % 43.6   PLATELETS Thousands/uL 276   NEUTROS ABS Thousands/µL 11.30*         Results from last 7 days   Lab Units 09/18/23  1856   SODIUM mmol/L 129*   POTASSIUM mmol/L 4.9   CHLORIDE mmol/L 96   CO2 mmol/L 25   ANION GAP mmol/L 8   BUN mg/dL 15   CREATININE mg/dL 1.14   EGFR ml/min/1.73sq m 63   CALCIUM mg/dL 10.0     Results from last 7 days   Lab Units 09/18/23  1856   AST U/L 20   ALT U/L 24   ALK PHOS U/L 96   TOTAL PROTEIN g/dL 7.7   ALBUMIN g/dL 4.0   TOTAL BILIRUBIN mg/dL 0.55     Results from last 7 days   Lab Units 09/19/23  0745   POC GLUCOSE mg/dl 106     Results from last 7 days   Lab Units 09/18/23  1856   GLUCOSE RANDOM mg/dL 171*               Results from last 7 days   Lab Units 09/18/23  1856   LIPASE u/L 24             ED Treatment:   Medication Administration from 09/18/2023 1758 to 09/18/2023 2306       Date/Time Order Dose Route Action Comments     09/18/2023 1953 EDT iohexol (OMNIPAQUE) 350 MG/ML injection (SINGLE-DOSE) 100 mL 100 mL Intravenous Given --        Present on Admission:  • Type 2 diabetes mellitus with hyperglycemia, without long-term current use of insulin (HCC)  • Chronic diastolic congestive heart failure (HCC)  • Primary hypertension  • Multiple sclerosis ([address])  • Hyponatremia  • Constipation      Admitting Diagnosis: Hip pain [M25.559]  Left hip pain [M25.552]  Leg weakness [R29.898]  Age/Sex: 75 y.o. male  Admission Orders:  Scheduled Medications:  amLODIPine, 10 mg, Oral, Daily   And  atorvastatin, 80 mg, Oral, Daily With Dinner  clopidogrel, 75 mg, Oral, Daily  enoxaparin, 40 mg, Subcutaneous, Daily  furosemide, 40 mg, Oral, Daily  gabapentin, 300 mg, Oral, BID  gabapentin, 600 mg, Oral, HS  insulin lispro, 1-6 Units, Subcutaneous, 4x Daily (AC & HS)  latanoprost, 1 drop, Both Eyes, HS  pantoprazole, 40 mg, Oral, BID AC  polyethylene glycol, 17 g, Oral, Daily  propranolol, 60 mg, Oral, Daily  senna-docusate sodium, 2 tablet, Oral, HS  sertraline, 25 mg, Oral, HS      Continuous IV Infusions:     PRN Meds:  acetaminophen, 650 mg, Oral, Q6H PRN  albuterol, 2 puff, Inhalation, Q6H PRN  ALPRAZolam, 0.25 mg, Oral, BID PRN  aluminum-magnesium hydroxide-simethicone, 30 mL, Oral, Q6H PRN  melatonin, 3 mg, Oral, HS PRN  ondansetron, 4 mg, Intravenous, Q6H PRN  polyethylene glycol, 17 g, Oral, Daily PRN        Network Utilization Review Department  ATTENTION: Please call with any questions or concerns to [phone] and carefully listen to the prompts so that you are directed to the right person. All voicemails are confidential.  Ying Pires all requests for admission clinical reviews, approved or denied determinations and any other requests to dedicated fax number below belonging to the campus where the patient is receiving treatment.  List of dedicated fax numbers for the Facilities:  [place] DENIALS (Administrative/Medical Necessity) [phone] [address] [facility] (Maternity/NICU/Pediatrics) [phone]   [address] [place] [address] [phone]   [address] [phone]   [address] [place] [address] [id number] [place] [address] [phone]   [address] [facility] [address] [phone]

## 2023-09-19 NOTE — PHYSICAL THERAPY NOTE
PHYSICAL THERAPY EVALUATION          Patient Name: Ismael Kayser XEOLM'A Date: 9/19/2023  PT EVALUATION    75 y.o.    [id number]    Hip pain [M25.559]  Left hip pain [M25.552]  Leg weakness [R29.898]    Past Medical History:   Diagnosis Date    Acute laryngitis     Acute nonsuppurative otitis media, unspecified laterality     Arm weakness     Arthritis     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HCC)     Bladder infection     Bronchitis     Constipation     Cough     Diabetes mellitus (HCC)     Dizziness     Dysfunction of eustachian tube     Erectile dysfunction of non-organic origin     Fatigue     Glaucoma     Hiatal hernia     Hypertension     Imbalance     Leg muscle spasm     MS (multiple sclerosis) (HCC)     Nephrolithiasis     Neurogenic bladder     No natural teeth     Sinus pain     Spinal stenosis     Strain of thoracic region     Stroke [facility])     Suprapubic catheter ([address] )      Past Surgical History:   Procedure Laterality Date    APPENDECTOMY      BRAIN SURGERY      Coil placed in aneurysm    CYSTOSCOPY      CYSTOSCOPY      CYSTOSCOPY  06/11/2018    CYSTOSCOPY  01/15/2021    EYE SURGERY      transscleral cyclophotocoagulation noncontact YAG laser    MYRINGOTOMY      with ventilation tube insertion    [place] LITHOLAPAXY SMPL/SM <2.5 CM N/A 5/7/2019    Procedure: CYSTOSCOPY, holmium laser litholapaxy of bladder stones, EXCHANGE OF SP TUBE;  Surgeon: Marianne Cervantes MD;  Location: [facility];  Service: Urology    SUPRAPUBIC CATHETER INSERTION          09/19/23 0954   PT Last Visit   PT Visit Date 09/19/23   Note Type   Note type Evaluation   Pain Assessment   Pain Assessment Tool 0-10   Pain Score No Pain   Restrictions/Precautions   Other Precautions Contact/isolation;Cognitive; Chair Alarm; Bed Alarm;Multiple lines; Fall Risk   Home Living   Type of [address] [place] [address]  One level;Ramped entrance   [address] [facility] chair;Grab bars in shower;Commode;Grab bars around toilet   [place]; Wheelchair-manual;Hospital bed; Other (Comment)  (sit to stand device)   Additional Comments ramp +1 DAI   Prior Function   Level of [place] Needs assistance with ADLs; Needs assistance with functional mobility; Needs assistance with IADLS   Lives With Family  (brother, sister)   Ivana Nima Help From Family; Other (Comment)  (goes to [facility] life x1/wk)   IADLs Family/Friend/Other provides transportation; Family/Friend/Other provides meals; Family/Friend/Other provides medication management   Falls in the last 6 months 1 to 4   Comments per patient, gets A for mobility (bed mobility, use of sit to stand). unsure if he can self propel using UEs. General   Additional Pertinent History pt admitted 9/18/23. up and oob orders. PMHx significant for chronic cath, MS, T2DM, CHF, PN, CVA   Cognition   Overall Cognitive Status Impaired   Arousal/Participation Cooperative   Orientation Level Oriented to person;Oriented to place;Oriented to time  (general to time)   Memory Decreased recall of recent events;Decreased short term memory   Following Commands Follows one step commands with increased time or repetition   RLE Assessment   RLE Assessment X  (increased tone. observed 2- hip abd)   LLE Assessment   LLE Assessment X  (increased tone. observed 2- hip abd, 2+ knee ext)   Bed Mobility   Rolling R 5  Supervision   Additional items Bedrails   Rolling L 5  Supervision   Additional items Bedrails   Supine to Sit 2  Maximal assistance   Additional items Assist x 1;HOB elevated; Bedrails; Increased time required;Verbal cues;LE management; Other   Sit to Supine 2  Maximal assistance   Additional items Assist x 2;Bedrails; Increased time required;Verbal cues;LE management; Other   Balance   Static Sitting Fair -  (w use of bl UE support)   Endurance Deficit   Endurance Deficit No   Activity Tolerance   Activity Tolerance Patient tolerated treatment well; Other (Comment)  (decline transf trial)   Medical Staff Made Aware Marycruz OT   Nurse Made Aware yes   Assessment   Prognosis Fair   Problem List Decreased strength; Impaired balance;Decreased mobility; Decreased cognition; Impaired tone   Assessment Magan Reno is a 75 y.o. male admitted to [address] [place] [address] on 9/18/2023 for <principal problem not specified>. PT was consulted and pt was seen on 9/19/2023 for mobility assessment and d/c planning. Pt presents w readmission, medium fall risk, contact isolation, multiple lines. At baseline pt gets A for ADLs and mobility. Family uses sit to stand device to transf pt to wc. Pt is currently functioning at a max Ax1-2 for bed mobility including supine<>sit transf. Able to hold himself at [address] for support. Occ LOB when attempting dynamic seated tasks. Pt decline standing transf at this time. Appears to be functioning at baseline but may benefit continued therapy to improve bed mobility technique, seated balance, transf technique w device and wc propulsion. At this time PT recommendations for d/c are home w continued family support. May benefit from [address] as pt reports ? difficulty w wc propulsion and transf w use of device. Barriers to Discharge None   Goals   Patient Goals none offered   STG Expiration Date 09/29/23   Short Term Goal #1 1). Pt will perform bed mobility with mod Ax1 demonstrating appropriate technique 100% of the time in order to improve function. 2) wc propulsion >50' at S level. 3)  Improve overall seated balance 1/2 grade in order to optimize ability to perform functional tasks and reduce fall risk. 4) Increase activity tolerance to 45 minutes in order to improve endurance to functional tasks. 5) PT for ongoing patient and family/caregiver education, DME needs and d/c planning in order to promote highest level of function in least restrictive environment. Plan   Treatment/Interventions LE strengthening/ROM; Therapeutic exercise;Cognitive reorientation;Patient/family training;Equipment eval/education; Bed mobility; Compensatory technique education;Spoke to nursing;OT   PT Frequency 1-2x/wk   Recommendation   PT Discharge Recommendation Home with home health rehabilitation  (?HHPT)   AM-PAC Basic Mobility Inpatient   Turning in Flat Bed Without Bedrails 3   Lying on Back to Sitting on Edge of Flat Bed Without Bedrails 3   Moving Bed to Chair 1   Standing Up From Chair Using Arms 1   Walk in Room 1   Climb 3-5 Stairs With Railing 1   Basic Mobility Inpatient Raw Score 10   Turning Head Towards Sound 3   Follow Simple Instructions 3   Low Function Basic Mobility Raw Score  16   Low Function Basic Mobility Standardized Score  25.72   Highest Level Of Mobility   -HLM Goal 4: Move to chair/commode   JH-HLM Achieved 3: Sit at edge of bed   End of Consult   Patient Position at End of Consult Supine;Bed/Chair alarm activated; All needs within reach   History: co - morbidities including age, use of assistive device, assist for adl's, cognition, current experience including fall risk, multiple lines, contact isolation  Exam: impairments in systems including multiple body structures involved; neuromuscular (balance, bed level transfers, tone), cognition; activity limitations  (difficulties executing an action); am-pac  Clinical: unstable/unpredictable  Complexity:high      Clydie Agent, PT

## 2023-09-19 NOTE — ASSESSMENT & PLAN NOTE
Difficulty getting out of his wheelchair, overall feels weak    Plan:  • Secondary to underlying MS vs deconditioning from recent hospital stay   • Consult: PT/OT

## 2023-09-19 NOTE — H&P
[address] [facility]  H&P  Name: Peter Clark 75 y.o. male I MRN: [id number]  Unit/Bed#: E5 -01 I Date of Admission: 9/18/2023   Date of Service: 9/19/2023 I Hospital Day: 0      Assessment/Plan   Left hip pain  Assessment & Plan  Reported recent "slide" off his chair onto his buttock resulting in hip pain, limited active ROM due to pain, passive ROM intact. CT abd/pelvis without acute pelvic pathology, obtain dedicated hip XR    Ambulatory dysfunction  Assessment & Plan  Difficulty getting out of his wheelchair, overall feels weak    Plan:  • Secondary to underlying MS vs deconditioning from recent hospital stay   • Consult: PT/OT       Constipation  Assessment & Plan  Secondary to poor functional status, medication induced  CT noting constipation     Plan:  • Reports bowel movement earlier in the day  • Continue bowel regimen       Chronic diastolic congestive heart failure (HCC)  Assessment & Plan  Wt Readings from Last 3 Encounters:   09/18/23 76.9 kg (169 lb 8.5 oz)   08/28/23 86.2 kg (190 lb)   08/19/23 72.3 kg (159 lb 6.3 oz)     Appears euvolemic    Plan:  • Continue PTA propranolol/amlodipine     Hyponatremia  Assessment & Plan  Chronic hyponatremia with baseline 129-134    Plan:  • Na 129   • Continue trend BMP      Type 2 diabetes mellitus with hyperglycemia, without long-term current use of insulin (HCC)  Assessment & Plan  Lab Results   Component Value Date    HGBA1C 10.6 (H) 09/05/2023       No results for input(s): "POCGLU" in the last 72 hours.     Blood Sugar Average: Last 72 hrs:     Plan:  • BG goal 140-200 while inpatient, ACHS sliding scale, carb coverage diet, holding oral antihyperglycemics  • Long acting: none  • PTA regimen: trulicity    Multiple sclerosis ([address])  Assessment & Plan  Longstanding MS since the 60's not maintained on DMD, wheelchair bound    Plan:  • Continue gabapentin      Primary hypertension  Assessment & Plan  Normotensive Plan:  • Normotensive   • Continue propranolol 60mg daily, norvasc 10mg daily       Chronic suprapubic catheter [facility])  Assessment & Plan  Secondary to neurogenic bladder, chronic suprapubic catheter last exchanged 08/20      VTE Pharmacologic Prophylaxis: VTE Score: 6 Moderate Risk (Score 3-4) - Pharmacological DVT Prophylaxis Ordered: enoxaparin (Lovenox). Code Status: Level 1 - Full Code   Discussion with family: update in AM.     Anticipated Length of Stay: Patient will be admitted on an observation basis with an anticipated length of stay of less than 2 midnights secondary to ambulatory dysfunction. Total Time Spent on Date of Encounter in care of patient: This time was spent on one or more of the following: performing physical exam; counseling and coordination of care; obtaining or reviewing history; documenting in the medical record; reviewing/ordering tests, medications or procedures; communicating with other healthcare professionals and discussing with patient's family/caregivers. Chief Complaint: hip pain    History of Present Illness:  Hector Jean Baptiste is a 75 y.o. male with a PMH of MS, hyponatremia, CVA, T2DM, HTN, BREANA who presents with hip pain. History obtained from patient, chart review and discussion with hip pain. He presents to the ED tonight for hip pain, difficulty moving from his wheelchair. He was recently hospitalized for sepsis/pyelonephritis and had been recovering well. A week ago he slid off his chair onto his buttock/back and had been having hip pain since then. Mainly on his left side. Can move his leg with pain. Denies headstrike/LOC. No fevers/chills. No numbness/tingling down his legs. Review of Systems:  Review of Systems   Constitutional: Negative for chills and fever. Respiratory: Negative for shortness of breath. Cardiovascular: Negative for chest pain and palpitations. Gastrointestinal: Negative for abdominal pain, diarrhea, nausea and vomiting. Neurological: Negative for speech difficulty. 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)    • Bladder infection    • Bronchitis    • Constipation    • Cough    • Diabetes mellitus ([facility])    • Dizziness    • Dysfunction of eustachian tube    • Erectile dysfunction of non-organic origin    • Fatigue    • Glaucoma    • Hiatal hernia    • Hypertension    • Imbalance    • Leg muscle spasm    • MS (multiple sclerosis) ([facility])    • Nephrolithiasis    • Neurogenic bladder    • No natural teeth    • Sinus pain    • Spinal stenosis    • Strain of thoracic region    • Stroke [facility])    • Suprapubic catheter ([address])        Past Surgical History:   Procedure Laterality Date   • APPENDECTOMY     • BRAIN SURGERY      Coil placed in aneurysm   • CYSTOSCOPY     • CYSTOSCOPY     • CYSTOSCOPY  06/11/2018   • CYSTOSCOPY  01/15/2021   • EYE SURGERY      transscleral cyclophotocoagulation noncontact YAG laser   • MYRINGOTOMY      with ventilation tube insertion   • DE LITHOLAPAXY SMPL/SM <2.5 CM N/A 5/7/2019    Procedure: CYSTOSCOPY, holmium laser litholapaxy of bladder stones, EXCHANGE OF SP TUBE;  Surgeon: Eloy Reynolds MD;  Location: [facility];  Service: Urology   • SUPRAPUBIC CATHETER INSERTION         Meds/Allergies:  Prior to Admission medications    Medication Sig Start Date End Date Taking?  Authorizing Provider   albuterol (2.5 mg/3 mL) 0.083 % nebulizer solution Take 2.5 mg by nebulization every 6 (six) hours as needed for wheezing or shortness of breath   Yes Historical Provider, MD   albuterol (PROVENTIL HFA,VENTOLIN HFA) 90 mcg/act inhaler Inhale 2 puffs every 6 (six) hours as needed for wheezing   Yes Historical Provider, MD   ALPRAZolam (XANAX) 0.25 mg tablet Take 0.25 mg by mouth 2 (two) times a day as needed for anxiety or sleep    Yes Historical Provider, MD   amLODIPine-atorvastatin (CADUET) 10-80 MG per tablet Take 1 tablet by mouth daily   Yes Historical Provider, MD   clopidogrel (PLAVIX) 75 mg tablet Take 1 tablet (75 mg total) by mouth daily 10/27/18  Yes Viviana Mariee MD   Dulaglutide (Trulicity) 5.22 ZQ/1.4XE SOPN Inject 0.75 mg under the skin once a week   Yes Historical Provider, MD   Ergocalciferol (VITAMIN D2 PO) Take 50,000 Units by mouth once a week   Yes Historical Provider, MD   furosemide (LASIX) 40 mg tablet Take 40 mg by mouth daily   Yes Historical Provider, MD   gabapentin (NEURONTIN) 300 mg capsule Take 2 capsules (600 mg total) by mouth daily at bedtime 6/3/21  Yes Phan Vazquez,    latanoprost (XALATAN) 0.005 % ophthalmic solution Administer 1 drop to both eyes daily at bedtime   Yes Historical Provider, MD   melatonin 3 mg Take 3 mg by mouth daily at bedtime as needed   Yes Historical Provider, MD   pantoprazole (PROTONIX) 40 mg tablet TAKE 1 TABLET TWICE DAILY 30 MINUTES BEFORE BREAKFAST AND DINNER. 3/13/18  Yes Sue Sharp DO   gabapentin (NEURONTIN) 300 mg capsule Take 1 capsule (300 mg total) by mouth 2 (two) times a day 6/3/21   Natalee Vazquez,    polyethylene glycol (MIRALAX) 17 g packet Take 17 g by mouth daily as needed    Historical Provider, MD   potassium chloride (Klor-Con) 10 mEq tablet Take 10 mEq by mouth 2 (two) times a day    Historical Provider, MD   predniSONE 20 mg tablet Take 2 tablets (40 mg total) by mouth daily  Patient not taking: Reported on 8/28/2023 6/30/23   Isaak Juarez MD   propranolol (INDERAL LA) 60 mg 24 hr capsule Take 60 mg by mouth daily    Historical Provider, MD   senna-docusate sodium (SENOKOT S) 8.6-50 mg per tablet Take 2 tablets by mouth daily at bedtime    Historical Provider, MD   sertraline (ZOLOFT) 25 mg tablet Take 25 mg by mouth daily at bedtime    Historical Provider, MD     I have reviewed home medications with patient personally.     Allergies: Allergies   Allergen Reactions   • Cephalexin Rash       Social History:  Marital Status: Single   Occupation:   Patient Pre-hospital Living Situation: Home  Patient Pre-hospital Level of Mobility: walks  Patient Pre-hospital Diet Restrictions: none  Substance Use History:   Social History     Substance and Sexual Activity   Alcohol Use Not Currently     Social History     Tobacco Use   Smoking Status Former   • Packs/day: 0.50   • Years: 31.00   • Total pack years: 15.50   • Types: Cigarettes   • Start date: 55   • Quit date: 18   • Years since quittin.7   Smokeless Tobacco Never     Social History     Substance and Sexual Activity   Drug Use No       Family History:  Family History   Problem Relation Age of Onset   • Heart attack Mother    • Stroke Mother    • Heart attack Father    • Anuerysm Father         In Stomach        Physical Exam:     Vitals:   Blood Pressure: 134/58 (23)  Pulse: 83 (23)  Temperature: (!) 97.2 °F (36.2 °C) (23)  Temp Source: Oral (23)  Respirations: 16 (23)  Weight - Scale: 76.9 kg (169 lb 8.5 oz) (23)  SpO2: 99 % (23)    Physical Exam  Vitals and nursing note reviewed. Constitutional:       General: He is not in acute distress. Appearance: He is well-developed. HENT:      Head: Normocephalic and atraumatic. Eyes:      Conjunctiva/sclera: Conjunctivae normal.   Cardiovascular:      Rate and Rhythm: Normal rate and regular rhythm. Heart sounds: No murmur heard. Pulmonary:      Effort: Pulmonary effort is normal. No respiratory distress. Breath sounds: Normal breath sounds. Abdominal:      Palpations: Abdomen is soft. Tenderness: There is no abdominal tenderness. Musculoskeletal:         General: No swelling. Cervical back: Neck supple. Left upper leg: Tenderness present. No swelling, edema or deformity. Right lower leg: No swelling. No edema.       Left lower leg: No swelling. No edema. Skin:     General: Skin is warm and dry. Capillary Refill: Capillary refill takes less than 2 seconds. Neurological:      Mental Status: He is alert. Psychiatric:         Mood and Affect: Mood normal.          Additional Data:     Lab Results:  Results from last 7 days   Lab Units 09/18/23  1856   WBC Thousand/uL 15.89*   HEMOGLOBIN g/dL 14.3   HEMATOCRIT % 43.6   PLATELETS Thousands/uL 276   NEUTROS PCT % 70   LYMPHS PCT % 18   MONOS PCT % 6   EOS PCT % 4     Results from last 7 days   Lab Units 09/18/23  1856   SODIUM mmol/L 129*   POTASSIUM mmol/L 4.9   CHLORIDE mmol/L 96   CO2 mmol/L 25   BUN mg/dL 15   CREATININE mg/dL 1.14   ANION GAP mmol/L 8   CALCIUM mg/dL 10.0   ALBUMIN g/dL 4.0   TOTAL BILIRUBIN mg/dL 0.55   ALK PHOS U/L 96   ALT U/L 24   AST U/L 20   GLUCOSE RANDOM mg/dL 171*                       Lines/Drains:  Invasive Devices     Peripheral Intravenous Line  Duration           Peripheral IV 09/18/23 Right Antecubital <1 day          Drain  Duration           Suprapubic Catheter 20 Fr. 85 days                    Imaging: Reviewed radiology reports from this admission including: abdominal/pelvic CT and Personally reviewed the following imaging: abdominal/pelvic CT  CT abdomen pelvis with contrast   Final Result by Rachael Zabala MD (09/18 2128)      Findings above compatible with resolving pyelonephritis and cystitis, overall improved in appearance compared to the prior recent examinations. Recommend clinical correlation and  follow-up. Persistent significant fecal retention with large amount of stool within the rectal vault. Correlate for fecal impaction. Workstation performed: [id number]             EKG and Other Studies Reviewed on Admission:   · EKG: No EKG obtained. ** Please Note: This note has been constructed using a voice recognition system.  **

## 2023-09-19 NOTE — OCCUPATIONAL THERAPY NOTE
Occupational Therapy Evaluation     Patient Name: Rob DESOUZA Date: 9/19/2023  Problem List  Active Problems:    Chronic suprapubic catheter ([address])    Primary hypertension    Multiple sclerosis ([address])    Type 2 diabetes mellitus with hyperglycemia, without long-term current use of insulin (HCC)    Hyponatremia    Chronic diastolic congestive heart failure (HCC)    Constipation    Ambulatory dysfunction    Left hip pain    Past Medical History  Past Medical History:   Diagnosis Date    Acute laryngitis     Acute nonsuppurative otitis media, unspecified laterality     Arm weakness     Arthritis     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HCC)     Bladder infection     Bronchitis     Constipation     Cough     Diabetes mellitus (HCC)     Dizziness     Dysfunction of eustachian tube     Erectile dysfunction of non-organic origin     Fatigue     Glaucoma     Hiatal hernia     Hypertension     Imbalance     Leg muscle spasm     MS (multiple sclerosis) (HCC)     Nephrolithiasis     Neurogenic bladder     No natural teeth     Sinus pain     Spinal stenosis     Strain of thoracic region     Stroke [facility])     Suprapubic catheter [facility])      Past Surgical History  Past Surgical History:   Procedure Laterality Date    APPENDECTOMY      BRAIN SURGERY      Coil placed in aneurysm    CYSTOSCOPY      CYSTOSCOPY      CYSTOSCOPY  06/11/2018    CYSTOSCOPY  01/15/2021    EYE SURGERY      transscleral cyclophotocoagulation noncontact YAG laser    MYRINGOTOMY      with ventilation tube insertion    [place] LITHOLAPAXY SMPL/SM <2.5 CM N/A 5/7/2019    Procedure: CYSTOSCOPY, holmium laser litholapaxy of bladder stones, EXCHANGE OF SP TUBE;  Surgeon: Adriana Camarena MD;  Location[facility];  Service: Urology    SUPRAPUBIC CATHETER INSERTION          09/19/23 0941   OT Last Visit   OT Visit Date 09/19/23   Note Type   Note type Evaluation   Pain Assessment   Pain Assessment Tool 0-10   Pain Score No Pain   Restrictions/Precautions   Weight Bearing Precautions Per Order No   Other Precautions Fall Risk;Hard of hearing;Cognitive; Bed Alarm;Multiple lines;Telemetry   Home Living   Type of [address] [place] [address] One level;Ramped entrance;Performs ADLs on one level   Bathroom Shower/Tub Walk-in shower   Bathroom Toilet Raised   Bathroom Equipment Grab bars in shower;Built-in shower seat;Commode   Home Equipment Walker;Cane;Wheelchair-manual;Grab bars; Hospital bed   Prior Function   Level of [place] Needs assistance with ADLs; Needs assistance with functional mobility; Needs assistance with IADLS   Lives With Family  (Sister and brother)   Rasheed Lundborg Help From Family   IADLs Family/Friend/Other provides transportation; Family/Friend/Other provides meals; Family/Friend/Other provides medication management   Falls in the last 6 months 1 to 4   Vocational Retired   Lifestyle   Autonomy Prior to admission, required (A) with ADLs and required (A) with IADLs. Patient lives in a one story home w/ ramped entrance. Walk in shower and raised toilet. RW, cane, w/c, hospital bed. Utilizes sit<>stand Ax1 at baseline to perform transfers. States he is able to self-propel in manual w/c w/ b/l UEs. Reciprocal Relationships brother, sister   Intrinsic Gratification Watching TV   General   Additional Pertinent History Comorbidities affecting pt’s functional performance include a significant PMH of: constipation, CHF, hyponatremia, DM2, MS, HTN, chronic suprapubic catheter, diabetic neuropathy, cervical spinal stenosis, aneurysm of basilar artery, benign colon polyp, BREANA, glaucoma, HLD, GERSON, urinary retention, hx of CVA, bladder stones, bladder neck contracture, pancreatic mass, pancreatic lesion, SOB, sepsis due to UTI.    Family/Caregiver Present No   Subjective   Subjective "I'm ok"   ADL   Where Assessed Edge of bed   Eating Assistance 6  Modified independent   Grooming Assistance 4  Minimal Assistance   [email or web address] 2  Maximal Assistance   UB Dressing Assistance 3  Moderate Assistance   LB Dressing Assistance 1  Total Assistance   Toileting Assistance  1  Total Assistance   Bed Mobility   Rolling R 5  Supervision   Additional items Bedrails   Rolling L 5  Supervision   Additional items Bedrails   Supine to Sit 2  Maximal assistance   Additional items Assist x 1;HOB elevated; Bedrails; Increased time required;Verbal cues;LE management; Other  (trunk)   Sit to Supine 2  Maximal assistance   Additional items Assist x 2;Bedrails; Increased time required;Verbal cues;LE management; Other  (trunk)   Transfers   Sit to Stand Unable to assess   Functional Mobility   Additional Comments unable to assess. Balance   Static Sitting   (Poor- progressing to Fair- w/ b/l UE support)   Dynamic Sitting   (Zero w/o UE support)   Activity Tolerance   Activity Tolerance Patient tolerated treatment well   Medical Staff Made Aware Roma PT   Nurse Made Aware Yes   RUE Assessment   RUE Assessment X   LUE Assessment   LUE Assessment X   Hand Function   Gross Motor Coordination Functional   Fine Motor Coordination Functional   Sensation   Light Touch No apparent deficits   Vision-Basic Assessment   Current Vision No visual deficits   Vision - Complex Assessment   Ocular Range of Motion Intact   Acuity Able to read clock/calendar on wall without difficulty; Able to read employee name badge without difficulty   Perception   Inattention/Neglect Appears intact   Cognition   Overall Cognitive Status Impaired   Arousal/Participation Alert; Responsive; Cooperative   Attention Attends with cues to redirect   Orientation Level Oriented to person;Oriented to place;Oriented to time  (general to time)   Memory Decreased recall of recent events;Decreased short term memory   Following Commands Follows one step commands with increased time or repetition   Assessment   Limitation Decreased ADL status; Decreased UE ROM; Decreased UE strength;Decreased Safe judgement during ADL;Decreased endurance;Decreased self-care trans;Decreased high-level ADLs   Prognosis Fair;Guarded   Assessment Patient is a 75y.o. year old male seen for OT eval s/p admit to [facility] on 2023 with ambulatory dysfunction, L hip pain. Patient with active OT orders and activity orders for Up and OOB as tolerated . Personal factors affecting pt at time of IE include: difficulty performing ADLs and IADLs, difficulty with functional mobility/transfers. Upon evaluation, patient’s functional status as follows: eating: Kylah, grooming: Thuy, UB bathing: Thuy, LB bathing: maxA, UB dressing: modA, LB dressing: dependent, toileting: dependent; functional transfers: DNT, bed mobility: maxA and maxAx2, functional mobility: DNT, sitting/standing tolerance: ~5 min sitting EOB w/ unilateral-b/l UE support - due to the following deficits impacting occupational performance: weakness, decreased UE ROM, decreased strength , decreased balance, decreased activity tolerance, limited functional reach, impaired memory, decreased safety awareness, impaired coordination and spasticity . These impairments, as well at pt’s difficulty performing ADLs, difficulty performing IADLs, difficulty performing transfers/mobility, limited insight into deficits, fall risk , functional decline  and advanced age limit pt’s ability to safely engage in all baseline areas of occupation. Patient would benefit from continued skilled OT therapy while in acute setting to address deficits as defined above and maximize (I) with ADLs and functional mobility. Occupational performance areas to address include: grooming, bathing/shower, dressing and functional mobility. Based on the aforementioned OT evaluation, functional performance deficits, and assessments, pt has been identified as a high complexity evaluation. From OT standpoint, recommend home with 24/7 S and HHOT upon D/C.  OT will continue to follow pt 1-3x/wk to address the following goals to  w/in 10-14 days. Goals   Patient Goals none offered   LTG Time Frame 10-14   Plan   Treatment Interventions ADL retraining;Functional transfer training;UE strengthening/ROM; Endurance training;Cognitive reorientation;Patient/family training; Compensatory technique education;Continued evaluation; Energy conservation; Activityengagement; Neuromuscular reeducation   Goal Expiration Date 10/03/23   OT Treatment Day 0   OT Frequency 1-2x/wk;2-3x/wk   Recommendation   OT Discharge Recommendation No rehabilitation needs   AM-PAC Daily Activity Inpatient   Lower Body Dressing 1   Bathing 2   Toileting 1   Upper Body Dressing 2   Grooming 3   Eating 3   Daily Activity Raw Score 12   Daily Activity Standardized Score (Calc for Raw Score >=11) 30.6   AM-PAC Applied Cognition Inpatient   Following a Speech/Presentation 4   Understanding Ordinary Conversation 4   Taking Medications 2   Remembering Where Things Are Placed or Put Away 3   Remembering List of 4-5 Errands 3   Taking Care of Complicated Tasks 2   Applied Cognition Raw Score 18   Applied Cognition Standardized Score 38.07     Occupational Therapy goals: In 7-14 days:     1- Pt will complete bed mobility at a ModAx1 level w/ G balance/safety demonstrated to decrease caregiver assistance required   2- Patient will verbalize and demonstrate good body mechanics and joint protection techniques during ADLs/IADLs with no verbal cues   3- Pt will increase BUE strength by 1MM grade via AROM/AAROM/PROM exercises to increase independence in ADLs and transfers    4- Patient will increase OOB/sitting tolerance to 2-4 hours per day for increased participation in self care and leisure tasks with no s/s of exertion.    5- Pt will tolerate continued OOB assessment and appropriate functional transfer/mobility goals will be established by OTR as applicable   6- Pt will be attentive 100% of the time during ongoing cognitive assessment w/ G participation to assist w/ safe d/c planning/recommendations   7- Patient will complete UB S with Laisha utilizing appropriate DME/AE PRN   9- Pt will improve functional mobility during ADL/IADL/leisure tasks to Mod I using DME as needed w/ G balance/safety      Mandy Nolasco, OTR/L

## 2023-09-19 NOTE — CASE MANAGEMENT
Case Management Discharge Planning Note    Patient name Brenda Burch  Location East 5 /E5 MS 0-* MRN [id number]  : 1950 Date 2023       Current Admission Date: 2023  Current Admission Diagnosis:Type 2 diabetes mellitus with hyperglycemia, without long-term current use of insulin [facility])   Patient Active Problem List    Diagnosis Date Noted   • Ambulatory dysfunction 2023   • Left hip pain 2023   • Sepsis due to urinary tract infection ([address]) 2023   • Pyelonephritis 2023   • Constipation 2023   • Chronic diastolic congestive heart failure ([address]) 2023   • Hyponatremia 2023   • Excessive daytime sleepiness 2022   • Shortness of breath 2022   • Pancreatic mass 2020   • Pancreatic lesion 2020   • Bladder stones 2019   • Bladder neck contracture 2019   • History of CVA (cerebrovascular accident) 10/21/2018   • 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[address]) 2017   • Diabetic neuropathy ([address]) 2016   • Chronic suprapubic catheter ([address]) 2016   • Thyroid nodule 2015   • Cervical spinal stenosis 2014   • Generalized anxiety disorder 2014   • Urinary retention 2014   • Obstructive sleep apnea 2013   • Benign colon polyp 2012   • Esophageal reflux 2012   • Fatty liver 2012   • Glaucoma 2012   • Hyperlipidemia 2012   • Multiple sclerosis ([address]) 2012   • Type 2 diabetes mellitus with hyperglycemia, without long-term current use of insulin ([address]) 2012   • Primary hypertension 2012      LOS (days): 0  Geometric Mean LOS (GMLOS) (days):   Days to GMLOS:     OBJECTIVE:            Current admission status: Observation   Preferred Pharmacy:   [facility]/pharmacy #[id number] Hai [address] [place] [address]  Phone: [phone] Fax: [phone]    [address], [address]   [place] T[place] Ascencio [address] [address]  Phone: [phone] Fax: [phone]    [address] [facility] [address], [place] - [address] [facility],  [address] [place] [address]  Phone: [phone] Fax: [phone]    Primary Care Provider: Fifi Ascencio DO    Primary Insurance: Richard Olmos  Secondary Insurance:     DISCHARGE DETAILS:    Additional Comments: Pt likely to discharge home today and is being recommended for [facility] services by therapy. CM received call from [place] with [facility] ([phone]) who confirmed that they do a home assessment and arrange [facility] if necessary and pt is agreeable. No other discharge concerns or needs expressed or identified at this time, CM department to remain available.

## 2023-09-19 NOTE — ASSESSMENT & PLAN NOTE
Secondary to poor functional status, medication induced  CT noting constipation     Plan:  • Reports bowel movement earlier in the day  • Continue bowel regimen

## 2023-09-20 VITALS
OXYGEN SATURATION: 96 % | HEART RATE: 83 BPM | BODY MASS INDEX: 29.1 KG/M2 | WEIGHT: 169.53 LBS | SYSTOLIC BLOOD PRESSURE: 129 MMHG | TEMPERATURE: 97.7 F | RESPIRATION RATE: 16 BRPM | DIASTOLIC BLOOD PRESSURE: 60 MMHG
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
## 2023-09-20 LAB
ANION GAP SERPL CALCULATED.3IONS-SCNC: 8 MMOL/L
BASOPHILS # BLD AUTO: 0.11 THOUSANDS/ÂΜL (ref 0–0.1)
BASOPHILS NFR BLD AUTO: 1 % (ref 0–1)
BUN SERPL-MCNC: 13 MG/DL (ref 5–25)
CALCIUM SERPL-MCNC: 9.6 MG/DL (ref 8.4–10.2)
CHLORIDE SERPL-SCNC: 100 MMOL/L (ref 96–108)
CO2 SERPL-SCNC: 27 MMOL/L (ref 21–32)
CREAT SERPL-MCNC: 1.03 MG/DL (ref 0.6–1.3)
EOSINOPHIL # BLD AUTO: 0.62 THOUSAND/ÂΜL (ref 0–0.61)
EOSINOPHIL NFR BLD AUTO: 6 % (ref 0–6)
ERYTHROCYTE [DISTWIDTH] IN BLOOD BY AUTOMATED COUNT: 13.2 % (ref 11.6–15.1)
GFR SERPL CREATININE-BSD FRML MDRD: 71 ML/MIN/1.73SQ M
GLUCOSE SERPL-MCNC: 158 MG/DL (ref 65–140)
GLUCOSE SERPL-MCNC: 169 MG/DL (ref 65–140)
GLUCOSE SERPL-MCNC: 221 MG/DL (ref 65–140)
HCT VFR BLD AUTO: 41.5 % (ref 36.5–49.3)
HGB BLD-MCNC: 14.1 G/DL (ref 12–17)
IMM GRANULOCYTES # BLD AUTO: 0.06 THOUSAND/UL (ref 0–0.2)
IMM GRANULOCYTES NFR BLD AUTO: 1 % (ref 0–2)
LYMPHOCYTES # BLD AUTO: 2.71 THOUSANDS/ÂΜL (ref 0.6–4.47)
LYMPHOCYTES NFR BLD AUTO: 25 % (ref 14–44)
MCH RBC QN AUTO: 29.2 PG (ref 26.8–34.3)
MCHC RBC AUTO-ENTMCNC: 34 G/DL (ref 31.4–37.4)
MCV RBC AUTO: 86 FL (ref 82–98)
MONOCYTES # BLD AUTO: 0.84 THOUSAND/ÂΜL (ref 0.17–1.22)
MONOCYTES NFR BLD AUTO: 8 % (ref 4–12)
NEUTROPHILS # BLD AUTO: 6.69 THOUSANDS/ÂΜL (ref 1.85–7.62)
NEUTS SEG NFR BLD AUTO: 59 % (ref 43–75)
NRBC BLD AUTO-RTO: 0 /100 WBCS
PLATELET # BLD AUTO: 295 THOUSANDS/UL (ref 149–390)
PMV BLD AUTO: 8.2 FL (ref 8.9–12.7)
POTASSIUM SERPL-SCNC: 3.7 MMOL/L (ref 3.5–5.3)
RBC # BLD AUTO: 4.83 MILLION/UL (ref 3.88–5.62)
SODIUM SERPL-SCNC: 135 MMOL/L (ref 135–147)
WBC # BLD AUTO: 11.03 THOUSAND/UL (ref 4.31–10.16)

## 2023-09-20 PROCEDURE — 99239 HOSP IP/OBS DSCHRG MGMT >30: CPT | Performed by: INTERNAL MEDICINE

## 2023-09-20 PROCEDURE — 80048 BASIC METABOLIC PNL TOTAL CA: CPT | Performed by: INTERNAL MEDICINE

## 2023-09-20 PROCEDURE — 82948 REAGENT STRIP/BLOOD GLUCOSE: CPT

## 2023-09-20 PROCEDURE — 85025 COMPLETE CBC W/AUTO DIFF WBC: CPT | Performed by: INTERNAL MEDICINE

## 2023-09-20 RX ADMIN — PROPRANOLOL HYDROCHLORIDE 60 MG: 60 CAPSULE, EXTENDED RELEASE ORAL at 08:04

## 2023-09-20 RX ADMIN — INSULIN LISPRO 2 UNITS: 100 INJECTION, SOLUTION INTRAVENOUS; SUBCUTANEOUS at 11:32

## 2023-09-20 RX ADMIN — POLYETHYLENE GLYCOL 3350 17 G: 17 POWDER, FOR SOLUTION ORAL at 08:05

## 2023-09-20 RX ADMIN — ENOXAPARIN SODIUM 40 MG: 40 INJECTION SUBCUTANEOUS at 08:05

## 2023-09-20 RX ADMIN — GABAPENTIN 300 MG: 300 CAPSULE ORAL at 08:04

## 2023-09-20 RX ADMIN — CLOPIDOGREL BISULFATE 75 MG: 75 TABLET ORAL at 08:04

## 2023-09-20 RX ADMIN — AMLODIPINE BESYLATE 10 MG: 10 TABLET ORAL at 08:04

## 2023-09-20 RX ADMIN — PANTOPRAZOLE SODIUM 40 MG: 40 TABLET, DELAYED RELEASE ORAL at 06:15

## 2023-09-20 RX ADMIN — FUROSEMIDE 40 MG: 40 TABLET ORAL at 08:04

## 2023-09-20 RX ADMIN — INSULIN LISPRO 1 UNITS: 100 INJECTION, SOLUTION INTRAVENOUS; SUBCUTANEOUS at 08:05

## 2023-09-20 NOTE — UTILIZATION REVIEW
Notification of Unplanned, Urgent, or   Emergency Inpatient Admission   [address] [facility], [facility]  Tax ID: [id number]  NPI: [id number]  [facility] [address] [facility]   Admission Level of Care: Inpatient  Place of Service Code: 24     Attending Physician Information  Attending Name and NPI#: [id number], [place] [[id number]]  Phone: [phone]     Admission Information  Inpatient Admission Date/Time: 9/19/23  5:16 PM  Discharge Date/Time: No discharge date for patient encounter. Admitting Diagnosis Code/Description:  Hip pain [M25.559]  Left hip pain [M25.552]  Leg weakness [R29.898]     Utilization Review Contact  Casie Real Utilization   Phone: [phone]  Fax: [phone]  Email: [email or web address]  Contact for approvals/pending authorizations, clinical reviews, and discharge. Physician Advisory Services Contact  Medical Necessity Denial [employer] [facility]dx[facility] [employer]  Phone: [phone]  Fax: [phone]  Email: [email or web address]

## 2023-09-20 NOTE — CASE MANAGEMENT
Case Management Discharge Planning Note    Patient name Rich Endo  Location East 5 /E5 MS 0-* MRN [id number]  : 1950 Date 2023       Current Admission Date: 2023  Current Admission Diagnosis:Accidental fall from chair   Patient Active Problem List    Diagnosis Date Noted   • Accidental fall from chair 2023   • Sepsis due to urinary tract infection ([address]) 2023   • Pyelonephritis 2023   • Constipation 2023   • Chronic diastolic congestive heart failure ([address]) 2023   • Hyponatremia 2023   • Excessive daytime sleepiness 2022   • Shortness of breath 2022   • Pancreatic mass 2020   • Pancreatic lesion 2020   • Bladder stones 2019   • Bladder neck contracture 2019   • History of CVA (cerebrovascular accident) 10/21/2018   • 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[address]) 2017   • Diabetic neuropathy ([address]) 2016   • Chronic suprapubic catheter ([address]) 2016   • Thyroid nodule 2015   • Cervical spinal stenosis 2014   • Generalized anxiety disorder 2014   • Urinary retention 2014   • Obstructive sleep apnea 2013   • Benign colon polyp 2012   • Esophageal reflux 2012   • Fatty liver 2012   • Glaucoma 2012   • Hyperlipidemia 2012   • Multiple sclerosis ([address]) 2012   • Type 2 diabetes mellitus with hyperglycemia, without long-term current use of insulin ([address]) 2012   • Primary hypertension 2012      LOS (days): 1  Geometric Mean LOS (GMLOS) (days):   Days to GMLOS:     OBJECTIVE:  Risk of Unplanned Readmission Score: 29.83         Current admission status: Inpatient   Preferred Pharmacy:   [id number], [address] [address]  Phone: [phone] Fax: [phone] [address], [address] [facility] [address]  Phone: [phone] Fax: [address], [address] [facility],  [address] [place] [address],  [address] [place] [address]  Phone: [phone] Fax: [phone]    Primary Care Provider: Shweta Gastelum DO    Primary Insurance: [employer] [address]  Secondary Insurance:     DISCHARGE DETAILS:    Discharge planning discussed with[de-identified] patient        CM contacted family/caregiver?: Yes             Contacts  Patient Contacts: Taran Mc (Brother) [phone] at bedside  Relationship to Patient[de-identified] Family  Contact Method: Phone  Phone Number: Taran Mc [facility]) [phone] at bedside  Reason/Outcome: Continuity of Care, Emergency Contact, Discharge [address]         Is the patient interested in [facility] at discharge?: No    Other Referral/Resources/Interventions Provided:  Interventions: [facility]    Treatment Team Recommendation: Home with [address] [facility]  Discharge Destination Plan[de-identified] Home with [address] [facility] at Discharge : [facility]  Dispatcher Contacted: Yes  Number/Name of Dispatcher: Madeleine Bustos [phone]     ETA of Transport (Date): 09/20/23  ETA of Transport (Time): 1430     Transfer Mode: Stretcher     Additional Comments: Patient needs home therapy and nursing. [facility] rep Caballero advises patient is set up with nursing and therapy    Discharge instructions sent to [employer], CM called patient's brother to inform of discharge and transport time.

## 2023-09-20 NOTE — ASSESSMENT & PLAN NOTE
Known ambulatory dysfunction related to MS   He accidentally slid out of the chair transferring to his wheelchair  CT and x-ray showed no bony injury. Patient has no pain or discomfort  PT OT evaluation reviewed.   Resume [facility] with [facility]

## 2023-09-20 NOTE — PROGRESS NOTES
[facility]  Progress Note  Name: Leona Murillo  MRN: [id number]  Unit/Bed#: E5 -01 I Date of Admission: 9/18/2023   Date of Service: 9/20/2023 I Hospital Day: 1    Assessment/Plan   * Accidental fall from chair  Assessment & Plan  Known ambulatory dysfunction related to MS   He accidentally slid out of the chair transferring to his wheelchair  CT and x-ray showed no bony injury. Patient has no pain or discomfort  PT OT evaluation reviewed. Resume [facility] with [facility]      Constipation  Assessment & Plan  Much improved after milk and molasses enema today. Continue bowel regimen at home. Type 2 diabetes mellitus with hyperglycemia, without long-term current use of insulin [facility])  Assessment & Plan  Lab Results   Component Value Date    HGBA1C 10.6 (H) 09/05/2023       Recent Labs     09/19/23  1613 09/19/23  2146 09/20/23  0751 09/20/23  1116   POCGLU 167* 210* 158* 221*     • Resume Trulicity on discharge    Primary hypertension  Assessment & Plan  • Continue amlodipine and Inderal      Multiple sclerosis ([address])  Assessment & Plan  Currently not on active treatment. Chronically wheelchair-bound. Continue Neurontin as prescribed    Hyponatremia  Assessment & Plan  • Resolved               Medical Problems     Resolved Problems  Date Reviewed: 9/20/2023   None       Discharging Physician / Practitioner: Jamey Simeon MD  PCP: Kirsten Pablo DO  Admission Date:   Admission Orders (From admission, onward)     Ordered        09/19/23 1716  Inpatient Admission  Once            09/18/23 2225  Place in Observation  Once                      Discharge Date: 09/20/23    Consultations During Hospital Stay:  · None    Procedures Performed:   ·  milk of molasses enema x1    Significant Findings / Test Results:   XR femur 2 vw left  Result Date: 9/19/2023  Impression: No acute osseous abnormality.      CT abdomen pelvis with contrast  Result Date: 9/18/2023  Impression: Findings above compatible with resolving pyelonephritis and cystitis, overall improved in appearance compared to the prior recent examinations. Recommend clinical correlation and  follow-up. Persistent significant fecal retention with large amount of stool within the rectal vault. Correlate for fecal impaction. Incidental Findings:   · See above      Test Results Pending at Discharge (will require follow up):   · none     Outpatient Tests Requested:  · none    Complications:  none    Reason for Admission: fell off a chair     Hospital Course:   Macrina Lowe is a 75 y.o. male patient who originally presented to the hospital on 9/18/2023. He was trying to transfer himself from a chair to his wheelchair and accidentally slid off. He had denied having a hard fall. He was able to guide himself down. He was brought to the hospital because his brother could not get him up. CT and x-ray showed no acute abnormality except for significant constipation   He was treated with bowel regimen and a milk and molasses enema with good response. He was seen by PT OT who recommended home health    He is stable for DC home and continue follow-up with [facility]    Please see above list of diagnoses and related plan for additional information. Condition at Discharge: good    Discharge Day Visit / Exam:   Subjective: Seen and examined earlier during rounds. He felt well. He had no abdominal pain nausea or vomiting. No events overnight. Vitals: Blood Pressure: 129/60 (09/20/23 0749)  Pulse: 83 (09/20/23 0749)  Temperature: 97.7 °F (36.5 °C) (09/20/23 0749)  Temp Source: Oral (09/19/23 1457)  Respirations: 16 (09/20/23 0749)  Weight - Scale: 76.9 kg (169 lb 8.5 oz) (09/18/23 1801)  SpO2: 96 % (09/20/23 0749)  Exam:   Physical Exam  Vitals reviewed. Constitutional:       Appearance: He is not ill-appearing. HENT:      Head: Normocephalic and atraumatic.       Mouth/Throat:      Pharynx: No oropharyngeal exudate or posterior oropharyngeal erythema. Eyes:      General: No scleral icterus. Cardiovascular:      Rate and Rhythm: Normal rate and regular rhythm. Pulmonary:      Breath sounds: No wheezing or rhonchi. Abdominal:      Comments: Chronic suprapubic cath   Musculoskeletal:      Right lower leg: No edema. Left lower leg: No edema. Skin:     General: Skin is warm and dry. Coloration: Skin is not jaundiced or pale. Neurological:      Mental Status: He is alert. Comments: Awake alert pleasant   Psychiatric:         Mood and Affect: Mood normal.         Behavior: Behavior normal.        Discussion with Family: Updated  (brother) via phone. Discharge instructions/Information to patient and family:   See after visit summary for information provided to patient and family. Provisions for Follow-Up Care:  See after visit summary for information related to follow-up care and any pertinent home health orders. Disposition:   Home with [facility] (Reminder: Complete face to face encounter)    Planned Readmission: no     Discharge Statement:  I spent > 30 minutes discharging the patient. This time was spent on the day of discharge. I had direct contact with the patient on the day of discharge. Greater than 50% of the total time was spent examining patient, answering all patient questions, arranging and discussing plan of care with patient as well as directly providing post-discharge instructions. Additional time then spent on discharge activities. Discharge Medications:  See after visit summary for reconciled discharge medications provided to patient and/or family.       **Please Note: This note may have been constructed using a voice recognition system**

## 2023-09-20 NOTE — PLAN OF CARE
Problem: Potential for Falls  Goal: Patient will remain free of falls  Description: INTERVENTIONS:  - Educate patient/family on patient safety including physical limitations  - Instruct patient to call for assistance with activity   - Consult OT/PT to assist with strengthening/mobility   - Keep Call bell within reach  - Keep bed low and locked with side rails adjusted as appropriate  - Keep care items and personal belongings within reach  - Initiate and maintain comfort rounds  - Make Fall Risk Sign visible to staff  - Apply yellow socks and bracelet for high fall risk patients  - Consider moving patient to room near nurses station  Outcome: Progressing     Problem: MOBILITY - ADULT  Goal: Maintain or return to baseline ADL function  Description: INTERVENTIONS:  -  Assess patient's ability to carry out ADLs; assess patient's baseline for ADL function and identify physical deficits which impact ability to perform ADLs (bathing, care of mouth/teeth, toileting, grooming, dressing, etc.)  - Assess/evaluate cause of self-care deficits   - Assess range of motion  - Assess patient's mobility; develop plan if impaired  - Assess patient's need for assistive devices and provide as appropriate  - Encourage maximum independence but intervene and supervise when necessary  - Involve family in performance of ADLs  - Assess for home care needs following discharge   - Consider OT consult to assist with ADL evaluation and planning for discharge  - Provide patient education as appropriate  Outcome: Progressing  Goal: Maintains/Returns to pre admission functional level  Description: INTERVENTIONS:  - Perform BMAT or MOVE assessment daily.   - Set and communicate daily mobility goal to care team and patient/family/caregiver.    - Collaborate with rehabilitation services on mobility goals if consulted  - Out of bed for toileting  - Record patient progress and toleration of activity level   Outcome: Progressing     Problem: Prexisting or High Potential for Compromised Skin Integrity  Goal: Skin integrity is maintained or improved  Description: INTERVENTIONS:  - Identify patients at risk for skin breakdown  - Assess and monitor skin integrity  - Assess and monitor nutrition and hydration status  - Monitor labs   - Assess for incontinence   - Turn and reposition patient  - Assist with mobility/ambulation  - Relieve pressure over bony prominences  - Avoid friction and shearing  - Provide appropriate hygiene as needed including keeping skin clean and dry  - Evaluate need for skin moisturizer/barrier cream  - Collaborate with interdisciplinary team   - Patient/family teaching  - Consider wound care consult   Outcome: Progressing     Problem: SKIN/TISSUE INTEGRITY - ADULT  Goal: Skin Integrity remains intact(Skin Breakdown Prevention)  Description: Assess:  -Inspect skin when repositioning, toileting, and assisting with ADLS  -Assess extremities for adequate circulation and sensation     Bed Management:  -Have minimal linens on bed & keep smooth, unwrinkled  -Change linens as needed when moist or perspiring    Toileting:  -Offer bedside commode    Skin Care:  -Avoid use of baby powder, tape, friction and shearing, hot water or constrictive clothing  -Do not massage red bony areas    Next Steps:  Outcome: Progressing  Goal: Incision(s), wounds(s) or drain site(s) healing without S/S of infection  Description: INTERVENTIONS  - Assess and document dressing, incision, wound bed, drain sites and surrounding tissue  - Provide patient and family education  Outcome: Progressing  Goal: Pressure injury heals and does not worsen  Description: Interventions:  - Implement low air loss mattress or specialty surface (Criteria met)  - Apply silicone foam dressing  - Apply fecal or urinary incontinence containment device   - Utilize friction reducing device or surface for transfers   - Consider nutrition services referral as needed  Outcome: Progressing     Problem: MUSCULOSKELETAL - ADULT  Goal: Maintain or return mobility to safest level of function  Description: INTERVENTIONS:  - Assess patient's ability to carry out ADLs; assess patient's baseline for ADL function and identify physical deficits which impact ability to perform ADLs (bathing, care of mouth/teeth, toileting, grooming, dressing, etc.)  - Assess/evaluate cause of self-care deficits   - Assess range of motion  - Assess patient's mobility  - Assess patient's need for assistive devices and provide as appropriate  - Encourage maximum independence but intervene and supervise when necessary  - Involve family in performance of ADLs  - Assess for home care needs following discharge   - Consider OT consult to assist with ADL evaluation and planning for discharge  - Provide patient education as appropriate  Outcome: Progressing  Goal: Maintain proper alignment of affected body part  Description: INTERVENTIONS:  - Support, maintain and protect limb and body alignment  - Provide patient/ family with appropriate education  Outcome: Progressing

## 2023-09-20 NOTE — ASSESSMENT & PLAN NOTE
Lab Results   Component Value Date    HGBA1C 10.6 (H) 09/05/2023       Recent Labs     09/19/23  1613 09/19/23  2146 09/20/23  0751 09/20/23  1116   POCGLU 167* 210* 158* 221*     • Resume Trulicity on discharge

## 2023-09-20 NOTE — DISCHARGE SUMMARY
[facility]  Discharge- Maday Frias 1950, 75 y.o. male MRN: [id number]  Unit/Bed#: E5 -01 Encounter: [id number]  Primary Care Provider: Debra Kendrick DO   Date and time admitted to hospital: 9/18/2023  5:58 PM    * Accidental fall from chair  Assessment & Plan  Known ambulatory dysfunction related to MS   He accidentally slid out of the chair transferring to his wheelchair  CT and x-ray showed no bony injury. Patient has no pain or discomfort  PT OT evaluation reviewed. Resume follow-up with Senior care      Constipation  Assessment & Plan  Much improved after milk and molasses enema today. Continue bowel regimen at home. Type 2 diabetes mellitus with hyperglycemia, without long-term current use of insulin [facility])  Assessment & Plan  Lab Results   Component Value Date    HGBA1C 10.6 (H) 09/05/2023       Recent Labs     09/19/23  1613 09/19/23  2146 09/20/23  0751 09/20/23  1116   POCGLU 167* 210* 158* 221*     • Resume Trulicity on discharge    Primary hypertension  Assessment & Plan  • Continue amlodipine and Inderal      Multiple sclerosis ([address])  Assessment & Plan  Currently not on active treatment. Chronically wheelchair-bound.   Continue Neurontin as prescribed    Hyponatremia  Assessment & Plan  • Resolved          Discharging Physician / Practitioner: Ricci Lindsay MD  PCP: Debra Kendrick DO  Admission Date:       Admission Orders (From admission, onward)       Ordered         09/19/23 1716   Inpatient Admission  Once             09/18/23 2225   Place in Observation  Once                         Discharge Date: 09/20/23     Consultations During Hospital Stay:  • None     Procedures Performed:   •  milk of molasses enema x1     Significant Findings / Test Results:   XR femur 2 vw left  Result Date: 9/19/2023  Impression: No acute osseous abnormality.      CT abdomen pelvis with contrast  Result Date: 9/18/2023  Impression: Findings above compatible with resolving pyelonephritis and cystitis, overall improved in appearance compared to the prior recent examinations. Recommend clinical correlation and  follow-up. Persistent significant fecal retention with large amount of stool within the rectal vault. Correlate for fecal impaction.      Incidental Findings:   • See above        Test Results Pending at Discharge (will require follow up):   • none     Outpatient Tests Requested:  • none     Complications:  none     Reason for Admission: fell off a chair      Hospital Course:   Valerio Kulkarni is a 75 y.o. male patient who originally presented to the hospital on 9/18/2023. He was trying to transfer himself from a chair to his wheelchair and accidentally slid off. He had denied having a hard fall. He was able to guide himself down. He was brought to the hospital because his brother could not get him up. CT and x-ray showed no acute abnormality except for significant constipation   He was treated with bowel regimen and a milk and molasses enema with good response. He was seen by PT OT who recommended home health     He is stable for DC home and continue follow-up with Senior care     Please see above list of diagnoses and related plan for additional information.      Condition at Discharge: good     Discharge Day Visit / Exam:   Subjective: Seen and examined earlier during rounds. He felt well. He had no abdominal pain nausea or vomiting. No events overnight. Vitals: Blood Pressure: 129/60 (09/20/23 0749)  Pulse: 83 (09/20/23 0749)  Temperature: 97.7 °F (36.5 °C) (09/20/23 0749)  Temp Source: Oral (09/19/23 1457)  Respirations: 16 (09/20/23 0749)  Weight - Scale: 76.9 kg (169 lb 8.5 oz) (09/18/23 1801)  SpO2: 96 % (09/20/23 0749)  Exam:   Physical Exam  Vitals reviewed. Constitutional:       Appearance: He is not ill-appearing. HENT:      Head: Normocephalic and atraumatic.       Mouth/Throat:      Pharynx: No oropharyngeal exudate or posterior oropharyngeal erythema. Eyes:      General: No scleral icterus. Cardiovascular:      Rate and Rhythm: Normal rate and regular rhythm. Pulmonary:      Breath sounds: No wheezing or rhonchi. Abdominal:      Comments: Chronic suprapubic cath   Musculoskeletal:      Right lower leg: No edema. Left lower leg: No edema. Skin:     General: Skin is warm and dry. Coloration: Skin is not jaundiced or pale. Neurological:      Mental Status: He is alert. Comments: Awake alert pleasant   Psychiatric:         Mood and Affect: Mood normal.         Behavior: Behavior normal.         Discussion with Family: Updated  (brother) via phone.     Discharge instructions/Information to patient and family:   See after visit summary for information provided to patient and family.       Provisions for Follow-Up Care:  See after visit summary for information related to follow-up care and any pertinent home health orders. Disposition:   Home with VNA Services (Reminder: Complete face to face encounter)     Planned Readmission: no     Discharge Statement:  I spent > 30 minutes discharging the patient. This time was spent on the day of discharge. I had direct contact with the patient on the day of discharge. Greater than 50% of the total time was spent examining patient, answering all patient questions, arranging and discussing plan of care with patient as well as directly providing post-discharge instructions.   Additional time then spent on discharge activities.     Discharge Medications:  See after visit summary for reconciled discharge medications provided to patient and/or family.       **Please Note: This note may have been constructed using a voice recognition system**

## 2023-09-21 NOTE — UTILIZATION REVIEW
NOTIFICATION OF ADMISSION DISCHARGE   This is a Notification of Discharge from [address] [place] [address]. Please be advised that this patient has been discharge from our facility. Below you will find the admission and discharge date and time including the patient’s disposition. UTILIZATION REVIEW CONTACT:  Carter Jean-Baptiste  Utilization   Network Utilization Review Department  Phone: [phone] x carefully listen to the prompts. All voicemails are confidential.  Email: [email or web address]     ADMISSION INFORMATION  PRESENTATION DATE: 9/18/2023  5:58 PM  OBERVATION ADMISSION DATE:   INPATIENT ADMISSION DATE: 9/19/23  5:16 PM   DISCHARGE DATE: 9/20/2023  2:28 PM   DISPOSITION:Home with Home Health Care    IMPORTANT INFORMATION:  Send all requests for admission clinical reviews, approved or denied determinations and any other requests to dedicated fax number below belonging to the campus where the patient is receiving treatment.  List of dedicated fax numbers:  [place] DENIALS (Administrative/Medical Necessity) [phone] [address] [facility] (Maternity/NICU/Pediatrics) [phone]   [facility] [phone]   [facility] [phone] [address] [facility] [phone]   [address] [place] [address] [phone]   [facility] [phone]   [address] [place] [address] [address] [place] [address] [phone]   [address] [place] [address] [phone]   [address] [place] [address] [phone]   [address] [facility] [address] [facility] [phone]

## 2023-09-26 ENCOUNTER — HOSPITAL ENCOUNTER (OUTPATIENT)
Dept: PULMONOLOGY | Facility: HOSPITAL | Age: 73
Discharge: HOME | End: 2023-09-26
Payer: MEDICARE

## 2023-09-26 DIAGNOSIS — R06.02 SHORTNESS OF BREATH: ICD-10-CM

## 2023-09-26 LAB
BASE EXCESS BLDA CALC-SCNC: -1 MMOL/L (ref -2–3)
CA-I BLD-SCNC: 1.31 MMOL/L (ref 1.12–1.32)
FIO2 GAS DIL.REBREATH: 21 L
GLUCOSE SERPL-MCNC: 215 MG/DL (ref 65–140)
HCO3 BLDA-SCNC: 23.5 MMOL/L (ref 22–28)
HCT VFR BLD CALC: 47 % (ref 36.5–49.3)
HGB BLDA-MCNC: 16 G/DL (ref 12–17)
PCO2 BLD: 25 MMOL/L (ref 21–32)
PCO2 BLD: 36 MM HG (ref 36–44)
PH BLD: 7.42 [PH] (ref 7.35–7.45)
PO2 BLD: 107 MM HG (ref 75–129)
POTASSIUM BLD-SCNC: 4.3 MMOL/L (ref 3.5–5.3)
SAO2 % BLD FROM PO2: 98 % (ref 60–85)
SODIUM BLD-SCNC: 133 MMOL/L (ref 136–145)
SPECIMEN SOURCE: ABNORMAL

## 2023-09-26 PROCEDURE — 82330 ASSAY OF CALCIUM: CPT

## 2023-09-26 PROCEDURE — 82947 ASSAY GLUCOSE BLOOD QUANT: CPT

## 2023-09-26 PROCEDURE — 94729 DIFFUSING CAPACITY: CPT | Performed by: INTERNAL MEDICINE

## 2023-09-26 PROCEDURE — 94060 EVALUATION OF WHEEZING: CPT

## 2023-09-26 PROCEDURE — 82803 BLOOD GASES ANY COMBINATION: CPT

## 2023-09-26 PROCEDURE — 94760 N-INVAS EAR/PLS OXIMETRY 1: CPT

## 2023-09-26 PROCEDURE — 85014 HEMATOCRIT: CPT

## 2023-09-26 PROCEDURE — 84295 ASSAY OF SERUM SODIUM: CPT

## 2023-09-26 PROCEDURE — 94060 EVALUATION OF WHEEZING: CPT | Performed by: INTERNAL MEDICINE

## 2023-09-26 PROCEDURE — 36600 WITHDRAWAL OF ARTERIAL BLOOD: CPT

## 2023-09-26 PROCEDURE — 94729 DIFFUSING CAPACITY: CPT

## 2023-09-26 PROCEDURE — 84132 ASSAY OF SERUM POTASSIUM: CPT

## 2023-09-26 RX ORDER — ALBUTEROL SULFATE 2.5 MG/3ML
2.5 SOLUTION RESPIRATORY (INHALATION) ONCE
Status: COMPLETED | OUTPATIENT
Start: 2023-09-26 | End: 2023-09-26

## 2023-09-26 RX ADMIN — ALBUTEROL SULFATE 2.5 MG: 2.5 SOLUTION RESPIRATORY (INHALATION) at 10:24

## 2023-10-03 ENCOUNTER — OFFICE VISIT (OUTPATIENT)
Dept: PULMONOLOGY | Facility: CLINIC | Age: 73
End: 2023-10-03
Payer: MEDICARE

## 2023-10-03 VITALS
DIASTOLIC BLOOD PRESSURE: 62 MMHG | RESPIRATION RATE: 16 BRPM | OXYGEN SATURATION: 95 % | SYSTOLIC BLOOD PRESSURE: 108 MMHG | HEIGHT: 64 IN | BODY MASS INDEX: 29.1 KG/M2 | HEART RATE: 92 BPM

## 2023-10-03 DIAGNOSIS — Z23 NEED FOR IMMUNIZATION AGAINST INFLUENZA: ICD-10-CM

## 2023-10-03 DIAGNOSIS — G47.19 EXCESSIVE DAYTIME SLEEPINESS: ICD-10-CM

## 2023-10-03 DIAGNOSIS — R06.02 SHORTNESS OF BREATH: Primary | ICD-10-CM

## 2023-10-03 PROCEDURE — G0008 ADMIN INFLUENZA VIRUS VAC: HCPCS

## 2023-10-03 PROCEDURE — 99214 OFFICE O/P EST MOD 30 MIN: CPT | Performed by: INTERNAL MEDICINE

## 2023-10-03 PROCEDURE — 90662 IIV NO PRSV INCREASED AG IM: CPT

## 2023-10-03 RX ORDER — ALBUTEROL SULFATE 2.5 MG/3ML
2.5 SOLUTION RESPIRATORY (INHALATION) EVERY 6 HOURS PRN
Qty: 60 ML | Refills: 3 | Status: SHIPPED | OUTPATIENT
Start: 2023-10-03

## 2023-10-03 RX ORDER — ALBUTEROL SULFATE 90 UG/1
2 AEROSOL, METERED RESPIRATORY (INHALATION) EVERY 6 HOURS PRN
Qty: 18 G | Refills: 5 | Status: SHIPPED | OUTPATIENT
Start: 2023-10-03

## 2023-10-03 NOTE — PROGRESS NOTES
Pulmonary Outpatient Note   Peter Clark 75 y.o. male MRN: [id number]  10/4/2023      Reason for Consultation:    Chief Complaint   Patient presents with   • Shortness of Breath         Assessment/Plan:    1. Shortness of breath  Assessment & Plan:  Shortness of breath at rest.  Also with nocturnal awakenings due to dyspnea. Has albuterol  No history of asthma or COPD- no obstruction on spirometry. Normal DLCO. Has mild emphysema on CT  Former smoker quit over 30 years ago    Suspect neuromuscular weakness and sleep apnea primarily    Plan-   Recent PFTs- MIP/MEP not done- consider retesting in future but will hold off for now. Check sleep study- ordered in 2022 not done, reordered today and scheduled  Continue albuterol PRN for now. Orders:  -     albuterol (2.5 mg/3 mL) 0.083 % nebulizer solution; Take 3 mL (2.5 mg total) by nebulization every 6 (six) hours as needed for wheezing or shortness of breath  -     albuterol (Ventolin HFA) 90 mcg/act inhaler; Inhale 2 puffs every 6 (six) hours as needed for wheezing    2. Excessive daytime sleepiness  -     Diagnostic Sleep Study; Future; Expected date: 10/04/2023    3. Need for immunization against influenza  -     influenza vaccine, high-dose, PF 0.7 mL (FLUZONE HIGH-DOSE)        Health Maintenance  Immunization History   Administered Date(s) Administered   • INFLUENZA 11/21/2012, 10/21/2013   • Influenza Split High Dose Preservative Free IM 09/24/2014, 10/02/2015, 11/07/2016, 10/25/2017   • Influenza, high dose seasonal 0.7 mL 10/03/2023   • Pneumococcal Conjugate 13-Valent 11/07/2016   • Pneumococcal Polysaccharide PPV23 03/07/2015        Return in about 3 months (around 1/3/2024).     History of Present Illness   HPI:  Peter Clark is a 75 y.o. male who has multiple sclerosis, history of CVA, basilar artery aneurysm s/p coil emobolization 2015, hypertension, diabetes, anxiety, suprapubic catheter placement, non-ambulatory-who presents for follow-up shortness of breath. Seen 9/2022- first time following up  PFTs- no obstruction. Spirometry values stable. DLCO 100%. Lung volumes could not be done. MIP/MEP not done because the order was too old and it was renewed by another provider without the MIP/MEP being measured. A sleep study was ordered last year but then not done. He feels his shortness of breath is around the same as a year ago. It occurs at rest.    He does not cough. No wheezing. He does have albuterol inhaler also nebulizer. The medicine occasionally helps him. He reports he has nausea and vomits at times. He lives at home. Home nursing help- lives with his brother and sister. He has a hospital bed and sits propped up. Not currently on any medications for MS. He does not ambulate. Weakness in his legs. From 2022 consult  He was diagnosed with multiple sclerosis in the 1960s. Not currently on any specific MS therapy. Smoked 0.5 PPD for over 30 years quit in the 1990s. He has a large medication list and to be honest he is not sure what he is taking fully. His sister helps with medications and he has blister packs. No history of asthma or COPD. Has albuterol inhaler and nebulizer- he reports using this does help him. He wakes up short of breath- happens multiple times a night- at least twice. He is gasping for air when this happens. Has a hospital bed at home, sleeps between 30 and 45 degrees. He snores. He has no headaches in the morning. Tired during the day. Naps during the day. He is told he snores at night. He wakes up short of breath. He naps during the day. Falls asleep on a chair.       Has lost 20 + lbs over the last couple of years    Wt Readings from Last 3 Encounters:   09/18/23 76.9 kg (169 lb 8.5 oz)   08/28/23 86.2 kg (190 lb)   08/19/23 72.3 kg (159 lb 6.3 oz)         Pulmonary history:    Childhood lung disease/premature birth: No    Family hx of lung disease: No    # of ED/hospitalizations this year: 0    Dysphagia/Reflux: Yes on PPI    Leg swelling: ankle swelling    Exposure history:    Exposure to pets/birds/farm animals: No    Social history:    Smoking: Quit in 1990s, 0.5 PPD for over 30 years    Alcohol, ilicit drugs (inhalational/ IV): No    Worked as: In the past worked as a - retired 40 years ago due to [place] gas/asbestos/silica/chemicals/bio-fuels: No        Review of Systems   Constitutional: Negative for chills and fever. HENT: Negative for ear pain and sore throat. Eyes: Negative for pain and visual disturbance. Respiratory: Positive for shortness of breath. Negative for cough. Cardiovascular: Negative for chest pain and palpitations. Gastrointestinal: Negative for abdominal pain and vomiting. Genitourinary: Negative for dysuria and hematuria. Musculoskeletal: Negative for arthralgias and back pain. Skin: Negative for color change and rash. Neurological: Negative for seizures and syncope. All other systems reviewed and are negative.           Historical Information   Past Medical History:   Diagnosis Date   • Acute laryngitis    • Acute nonsuppurative otitis media, unspecified laterality    • Arm weakness    • Arthritis    • 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HCC)    • Bladder infection    • Bronchitis    • Constipation    • Cough    • Diabetes mellitus ([facility])    • Dizziness    • Dysfunction of eustachian tube    • Erectile dysfunction of non-organic origin    • Fatigue    • Glaucoma    • Hiatal hernia    • Hypertension    • Imbalance    • Leg muscle spasm    • MS (multiple sclerosis) ([facility])    • Nephrolithiasis    • Neurogenic bladder    • No natural teeth    • Sinus pain    • Spinal stenosis    • Strain of thoracic region    • Stroke [facility])    • Suprapubic catheter [facility])      Past Surgical History:   Procedure Laterality Date   • APPENDECTOMY     • BRAIN SURGERY      Coil placed in aneurysm   • CYSTOSCOPY     • CYSTOSCOPY     • CYSTOSCOPY  06/11/2018   • CYSTOSCOPY  01/15/2021   • EYE SURGERY      transscleral cyclophotocoagulation noncontact YAG laser   • MYRINGOTOMY      with ventilation tube insertion   • NE LITHOLAPAXY SMPL/SM <2.5 CM N/A 5/7/2019    Procedure: CYSTOSCOPY, holmium laser litholapaxy of bladder stones, EXCHANGE OF SP TUBE;  Surgeon: Sena Guallpa MD;  Location: [facility];  Service: Urology   • SUPRAPUBIC CATHETER INSERTION       Family History   Problem Relation Age of Onset   • Heart attack Mother    • Stroke Mother    • Heart attack Father    • Anuerysm Father         In Stomach        Meds/Allergies     Current Outpatient Medications:   •  albuterol (2.5 mg/3 mL) 0.083 % nebulizer solution, Take 3 mL (2.5 mg total) by nebulization every 6 (six) hours as needed for wheezing or shortness of breath, Disp: 60 mL, Rfl: 3  •  albuterol (Ventolin HFA) 90 mcg/act inhaler, Inhale 2 puffs every 6 (six) hours as needed for wheezing, Disp: 18 g, Rfl: 5  •  ALPRAZolam (XANAX) 0.25 mg tablet, Take 0.25 mg by mouth 2 (two) times a day as needed for anxiety or sleep , Disp: , Rfl:   •  amLODIPine-atorvastatin (CADUET) 10-80 MG per tablet, Take 1 tablet by mouth daily, Disp: , Rfl:   •  clopidogrel (PLAVIX) 75 mg tablet, Take 1 tablet (75 mg total) by mouth daily, Disp: , Rfl: 0  •  Dulaglutide (Trulicity) 1.61 YZ/4.3YP SOPN, Inject 0.75 mg under the skin once a week, Disp: , Rfl:   •  Ergocalciferol (VITAMIN D2 PO), Take 50,000 Units by mouth once a week, Disp: , Rfl:   •  furosemide (LASIX) 40 mg tablet, Take 40 mg by mouth daily, Disp: , Rfl:   •  gabapentin (NEURONTIN) 300 mg capsule, Take 1 capsule (300 mg total) by mouth 2 (two) times a day, Disp: 60 capsule, Rfl: 0  •  gabapentin (NEURONTIN) 300 mg capsule, Take 2 capsules (600 mg total) by mouth daily at bedtime, Disp: 30 capsule, Rfl: 0  •  latanoprost (XALATAN) 0.005 % ophthalmic solution, Administer 1 drop to both eyes daily at bedtime, Disp: , Rfl:   •  melatonin 3 mg, Take 3 mg by mouth daily at bedtime as needed, Disp: , Rfl:   •  pantoprazole (PROTONIX) 40 mg tablet, TAKE 1 TABLET TWICE DAILY 30 MINUTES BEFORE BREAKFAST AND DINNER., Disp: 180 tablet, Rfl: 1  •  polyethylene glycol (MIRALAX) 17 g packet, Take 17 g by mouth daily as needed, Disp: , Rfl:   •  potassium chloride (Klor-Con) 10 mEq tablet, Take 10 mEq by mouth 2 (two) times a day, Disp: , Rfl:   •  propranolol (INDERAL LA) 60 mg 24 hr capsule, Take 60 mg by mouth daily, Disp: , Rfl:   •  senna-docusate sodium (SENOKOT S) 8.6-50 mg per tablet, Take 2 tablets by mouth daily at bedtime, Disp: , Rfl:   •  sertraline (ZOLOFT) 25 mg tablet, Take 25 mg by mouth daily at bedtime, Disp: , Rfl:   Allergies   Allergen Reactions   • Cephalexin Rash       Vitals: Blood pressure 108/62, pulse 92, resp. rate 16, height 5' 4" (1.626 m), SpO2 95 %. Body mass index is 29.1 kg/m². Oxygen Therapy  SpO2: 95 %  Oxygen Therapy: None (Room air)      Physical Exam  Physical Exam  Vitals and nursing note reviewed. Constitutional:       General: He is not in acute distress. Appearance: He is well-developed. Comments: In a wheelchair   HENT:      Head: Normocephalic and atraumatic. Eyes:      Conjunctiva/sclera: Conjunctivae normal.   Cardiovascular:      Rate and Rhythm: Normal rate and regular rhythm. Heart sounds: No murmur heard. Pulmonary:      Effort: Pulmonary effort is normal. No respiratory distress. Breath sounds: Normal breath sounds. Abdominal:      Palpations: Abdomen is soft. Tenderness: There is no abdominal tenderness. Musculoskeletal:         General: No swelling. Cervical back: Neck supple. Skin:     General: Skin is warm and dry. Capillary Refill: Capillary refill takes less than 2 seconds. Neurological:      Mental Status: He is alert. Psychiatric:         Mood and Affect: Mood normal.         Labs: I have personally reviewed pertinent lab results.     ABG: No results found for: "PHART", "TKD8EVB", "PO2ART", "OBB6EKW", "M9LJEGUW", "BEART", "SOURCE",   BNP:   Lab Results   Component Value Date    BNP 34 08/18/2023   ,   CBC:  Lab Results   Component Value Date    WBC 11.03 (H) 09/20/2023    HGB 16.0 09/26/2023    HCT 47 09/26/2023    MCV 86 09/20/2023     09/20/2023    EOSPCT 6 09/20/2023    EOSABS 0.62 (H) 09/20/2023    NEUTOPHILPCT 59 09/20/2023    LYMPHOPCT 25 09/20/2023   ,   CMP:   Lab Results   Component Value Date    SODIUM 135 09/20/2023    K 3.7 09/20/2023     09/20/2023    CO2 25 09/26/2023    ANIONGAP 8 08/28/2015    BUN 13 09/20/2023    CREATININE 1.03 09/20/2023    GLUCOSE 215 (H) 09/26/2023    CALCIUM 9.6 09/20/2023    AST 20 09/18/2023    ALT 24 09/18/2023    ALKPHOS 96 09/18/2023    PROT 7.6 06/22/2016    BILITOT 0.4 06/22/2016    EGFR 71 09/20/2023   ,   PT/INR:   Lab Results   Component Value Date    INR 0.96 08/18/2023   ,   Troponin:   Lab Results   Component Value Date    TROPONINI <0.02 06/02/2021     Component      Latest Ref Rng 9/28/2022   ALTERNARIA ALTERNATA      0.00 - 0.09 kUA/I <0.10    ASPERGILLUS FUMIGATUS      0.00 - 0.09 kUA/I <0.10    BERMUDA GRASS      0.00 - 0.09 kUA/I <0.10    ALLERGEN MAPLE/BOX ELDER      0.00 - 0.09 kUA/I <0.10    ALLERGEN CAT EPITHELIUM-DANDER      0.00 - 0.09 kUA/I <0.10    CLADOSPORIUM HERBARUM      0.00 - 0.09 kUA/I <0.10    COCKROACH      0.00 - 0.09 kUA/I <0.10    ALLERGEN, COMMON SILVER BIRCH      0.00 - 0.09 kUA/I <0.10    ALLERGEN, COTTONWOOD      0.00 - 0.09 kUA/I <0.10    D. FARINAE      0.00 - 0.09 kUA/I <0.10    D.  PTERONYSSINUS      0.00 - 0.09 kUA/I <0.10    DOG DANDER      0.00 - 0.09 kUA/I <0.10    ALLERGEN ELM      0.00 - 0.09 kUA/I <0.10    MOUNTAIN CEDAR TREE      0.00 - 0.09 kUA/I <0.10    ALLERGEN MUGWORT IGE      0.00 - 0.09 kUA/I <0.10    MULBERRY TREE      0.00 - 0.09 kUA/I <0.10    ALLERGEN, OAK      0.00 - 0.09 kUA/I <0.10    PENICILLIUM CHRYSOGENUM      0.00 - 0.09 kUA/I <0.10    ALLERGEN ROUGH PIGWEED (W14) IGE      0.00 - 0.09 kUA/I <0.10    COMMON RAGWEED      0.00 - 0.09 kUA/I <0.10    ALLERGEN SHEEP SORREL (W18) IGE      0.00 - 0.09 kUA/I <0.10    [place] TREE      0.00 - 0.09 kUA/I <0.10    TRAN GRASS      0.00 - 0.09 kUA/I <0.10    [place] TREE      0.00 - 0.09 kUA/I <0.10    WHITE MEREDITH TREE      0.00 - 0.09 kUA/I <0.10    TOTAL IGE      0 - 113 kU/l 166 (H)    MOUSE URINE      0.00 - 0.09 kUA/I <0.10       Legend:  (H) High      Imaging and other studies: I have personally reviewed pertinent reports. and I have personally reviewed pertinent films in PACS    CT Chest 8/18/23  PULMONARY ARTERIAL TREE:  No pulmonary embolus is seen.     LUNGS: Emphysematous changes. There is no tracheal or endobronchial lesion.     PLEURA:  Unremarkable. CXR- no acute disease 9/9/22 and 5/15/20      PFT 2023  FEV1/FVC Ratio: 71.87 %  Forced Vital Capacity: 2.62 L    79 % predicted  FEV1: 1.89 L     74 % predicted        After administration of bronchodilator      FVC: 2.77 L, 83 % predicted, +5 % change  FEV1: 1.98 L, 78 % predicted, +4 % change     Lung volumes by body plethysmography:      DLCO corrected for patients hemoglobin level: 100 %     ABG: pH 7.42  PCO2 36   PO2 107   HCO3 23.5   SaO2 98%     Interpretation:     • Spirometry shows no obstruction but with mild decreased FVC.  Given there is no TLC and RV cannot confirm whether has restriction.     • No significant improvement in airflow or forced vital capacity in response to the administration to bronchodilator per ATS standards.      • Lung volumes not performed due to difficulty with maneuvers/unable to sit in body box     • Normal diffusion capacity     • Normal flow-volume loop      • Normal ABG    Pulmonary function testing: 10/2019    Patient unable to ambulate to plethysmography box; therefore, no pleth performed.     Post bronchodilator testing performed after the administration of 2.5mg albuterol in 3cc normal saline administered via nebulizer per bronchodilator protocol.     Results:  FEV1/FVC Ratio: 76 %  Forced Vital Capacity: 2.61 L    68 % predicted  FEV1: 1.99 L     68 % predicted     DLCO corrected for patients hemoglobin level: 77 %     Interpretation:     · No obstructive airflow defect      · Spirometry suggests mild restriction. Recommend checking lung volumes for further evaluation when patient is able.     · Normal diffusion capacity      EKG, Pathology, and Other Studies: I have personally reviewed pertinent reports. and I have personally reviewed pertinent films in Himanshu Keane M.D.   [facility]

## 2023-10-04 NOTE — ASSESSMENT & PLAN NOTE
Shortness of breath at rest.  Also with nocturnal awakenings due to dyspnea. Has albuterol  No history of asthma or COPD- no obstruction on spirometry. Normal DLCO. Has mild emphysema on CT  Former smoker quit over 30 years ago    Suspect neuromuscular weakness and sleep apnea primarily    Plan-   Recent PFTs- MIP/MEP not done- consider retesting in future but will hold off for now. Check sleep study- ordered in 2022 not done, reordered today and scheduled  Continue albuterol PRN for now.

## 2023-10-09 ENCOUNTER — TELEPHONE (OUTPATIENT)
Dept: SLEEP CENTER | Facility: CLINIC | Age: 73
End: 2023-10-09

## 2023-10-09 NOTE — TELEPHONE ENCOUNTER
----- Message from Gabi Sears MD sent at 10/8/2023  8:52 PM EDT -----  approved  ----- Message -----  From: Monica Howe  Sent: 10/4/2023   9:29 AM EDT  To: [facility] Provider    This Diagnostic sleep study needs approval.     If approved please sign and return to clerical pool. If denied please include reasons why. Also provide alternative testing if warranted. Please sign and return to clerical pool. Attending Attestation (For Attendings USE Only)...

## 2023-10-19 PROBLEM — N12 PYELONEPHRITIS: Status: RESOLVED | Noted: 2023-06-03 | Resolved: 2023-10-19

## 2023-10-20 PROBLEM — A41.9 SEPSIS DUE TO URINARY TRACT INFECTION: Status: RESOLVED | Noted: 2023-08-18 | Resolved: 2023-10-20

## 2023-10-20 PROBLEM — N39.0 SEPSIS DUE TO URINARY TRACT INFECTION: Status: RESOLVED | Noted: 2023-08-18 | Resolved: 2023-10-20

## 2023-10-27 ENCOUNTER — APPOINTMENT (OUTPATIENT)
Dept: LAB | Facility: HOSPITAL | Age: 73
End: 2023-10-27
Payer: MEDICARE

## 2023-10-27 DIAGNOSIS — R06.02 SHORTNESS OF BREATH: ICD-10-CM

## 2023-10-27 DIAGNOSIS — I10 PRIMARY HYPERTENSION: ICD-10-CM

## 2023-10-27 LAB
ALBUMIN SERPL BCP-MCNC: 3.7 G/DL (ref 3.5–5)
ALP SERPL-CCNC: 76 U/L (ref 34–104)
ALT SERPL W P-5'-P-CCNC: 21 U/L (ref 7–52)
ANION GAP SERPL CALCULATED.3IONS-SCNC: 13 MMOL/L
AST SERPL W P-5'-P-CCNC: 18 U/L (ref 13–39)
BASOPHILS # BLD AUTO: 0.08 THOUSANDS/ÂΜL (ref 0–0.1)
BASOPHILS NFR BLD AUTO: 0 % (ref 0–1)
BILIRUB SERPL-MCNC: 0.86 MG/DL (ref 0.2–1)
BUN SERPL-MCNC: 12 MG/DL (ref 5–25)
CALCIUM SERPL-MCNC: 9.4 MG/DL (ref 8.4–10.2)
CHLORIDE SERPL-SCNC: 99 MMOL/L (ref 96–108)
CO2 SERPL-SCNC: 22 MMOL/L (ref 21–32)
CREAT SERPL-MCNC: 0.81 MG/DL (ref 0.6–1.3)
EOSINOPHIL # BLD AUTO: 0.24 THOUSAND/ÂΜL (ref 0–0.61)
EOSINOPHIL NFR BLD AUTO: 1 % (ref 0–6)
ERYTHROCYTE [DISTWIDTH] IN BLOOD BY AUTOMATED COUNT: 14.6 % (ref 11.6–15.1)
GFR SERPL CREATININE-BSD FRML MDRD: 87 ML/MIN/1.73SQ M
GLUCOSE SERPL-MCNC: 102 MG/DL (ref 65–140)
HCT VFR BLD AUTO: 42 % (ref 36.5–49.3)
HGB BLD-MCNC: 13.8 G/DL (ref 12–17)
IMM GRANULOCYTES # BLD AUTO: 0.22 THOUSAND/UL (ref 0–0.2)
IMM GRANULOCYTES NFR BLD AUTO: 1 % (ref 0–2)
LYMPHOCYTES # BLD AUTO: 2.39 THOUSANDS/ÂΜL (ref 0.6–4.47)
LYMPHOCYTES NFR BLD AUTO: 13 % (ref 14–44)
MCH RBC QN AUTO: 29.1 PG (ref 26.8–34.3)
MCHC RBC AUTO-ENTMCNC: 32.9 G/DL (ref 31.4–37.4)
MCV RBC AUTO: 89 FL (ref 82–98)
MONOCYTES # BLD AUTO: 0.97 THOUSAND/ÂΜL (ref 0.17–1.22)
MONOCYTES NFR BLD AUTO: 5 % (ref 4–12)
NEUTROPHILS # BLD AUTO: 15.08 THOUSANDS/ÂΜL (ref 1.85–7.62)
NEUTS SEG NFR BLD AUTO: 80 % (ref 43–75)
NRBC BLD AUTO-RTO: 0 /100 WBCS
PLATELET # BLD AUTO: 356 THOUSANDS/UL (ref 149–390)
PMV BLD AUTO: 8.6 FL (ref 8.9–12.7)
POTASSIUM SERPL-SCNC: 3.7 MMOL/L (ref 3.5–5.3)
PROT SERPL-MCNC: 6.9 G/DL (ref 6.4–8.4)
RBC # BLD AUTO: 4.74 MILLION/UL (ref 3.88–5.62)
SODIUM SERPL-SCNC: 134 MMOL/L (ref 135–147)
WBC # BLD AUTO: 18.98 THOUSAND/UL (ref 4.31–10.16)

## 2023-10-27 PROCEDURE — 85025 COMPLETE CBC W/AUTO DIFF WBC: CPT

## 2023-10-27 PROCEDURE — 36415 COLL VENOUS BLD VENIPUNCTURE: CPT

## 2023-10-27 PROCEDURE — 80053 COMPREHEN METABOLIC PANEL: CPT

## 2023-10-27 PROCEDURE — 87636 SARSCOV2 & INF A&B AMP PRB: CPT

## 2023-10-28 LAB
FLUAV RNA RESP QL NAA+PROBE: NEGATIVE
FLUBV RNA RESP QL NAA+PROBE: NEGATIVE
SARS-COV-2 RNA RESP QL NAA+PROBE: NEGATIVE

## 2023-12-04 ENCOUNTER — APPOINTMENT (OUTPATIENT)
Dept: LAB | Facility: HOSPITAL | Age: 73
End: 2023-12-04
Payer: MEDICARE

## 2023-12-04 DIAGNOSIS — E11.65 TYPE 2 DIABETES MELLITUS WITH HYPERGLYCEMIA, WITHOUT LONG-TERM CURRENT USE OF INSULIN (HCC): ICD-10-CM

## 2023-12-04 LAB
EST. AVERAGE GLUCOSE BLD GHB EST-MCNC: 186 MG/DL
HBA1C MFR BLD: 8.1 %

## 2023-12-04 PROCEDURE — 83036 HEMOGLOBIN GLYCOSYLATED A1C: CPT

## 2023-12-04 PROCEDURE — 36415 COLL VENOUS BLD VENIPUNCTURE: CPT

## 2023-12-06 NOTE — ASSESSMENT & PLAN NOTE
Dx: Rupture of posterior cruciate ligament of right knee, R knee pain           Authorized # of Visits:  12         Next MD visit: none scheduled  Fall Risk: low        Precautions: instability in knee with tear in PCL       Referring MD: Betsey Acosta     Start of Service: 11/22/23     Subjective:   Pt reports that she tripped over a cord since the last appointment last week and that it feels more unstable, loose, and a little pain. Pt reports compliance with HEP and reports no issues with any exercises. Pain 0-3/10   Objective:   Improved SLB  Improved neutral knee control with WB exercises  Improved LE strength    Assessment:   Pt demonstrated improved endurance on stationary bike with no pain. Pt able to demonstrate improved quad and HS strength with LAQ and seated HS curls, pt able to complete with prop tech and no pain. Pt able to complete sidesteps with prop tech and no pain, pt req VC and TC for neutral knees during monster walks. Pt req TC for neutral knees while performing DL press on shuttle, pt was not able to correct and req theraband for external cue. Pt was able to improve tech after decreasing resistance. Pt was able to complete SL press on shuttle with improved tech with VC to maintain neutral knees, pt was able to correct immediately. Pt was able to maintain neutral knees during SLB with increased difficulty with flexed knee while maintaining SLS. Pt attempted SLB on airex but was unable to maintain neutral knees on both sides. Pt was able to complete lat step down with neutral knee, req VC to increase hip flexion by sitting back into a chair, pt reported increased difficulty but no pain. Pt was able to complete exercise with prop tech after correction. Pt was able to complete SL bridging with prop tech and no pain. Pt reported no pain at the end of session with slight fatigue in R LE.       Goals: (To be met in 12 visits)   Pt will increase hip and knee strength to grossly 5/5 to be able to ambulate · Per old records, patient has chronic hyponatremia with a baseline sodium ranging approximately 129-134  · Current sodium at his baseline  · Possibly medication related ambulate around school campus without pain. Pt will demonstrate increased hip ER/ABD strength to 5/5 to perform stepping and squatting activities without excessive femoral IR/ADD. Pt will be independent and compliant with comprehensive HEP to maintain progress achieved in PT. Pt able to increase knee flexion strength to 5/5 on R side in order to improve gait mechanics during community ambulation.     Plan:   Pt to be able to self recognize knee valgus and progress strengthening as pt tolerates  Date: 11/24/2023   TX#: 2/12 Date: 11/30/23              TX#: 3/12   Date:       12/6/23        TX#: 4/12 Date:               TX#: 5/12 Date:               TX#: 6/12 Date:               TX#: 7/12 Date:               TX#: 8/12   TE x 40  Bike x 5 mins  Standing TKE in mirror x 5, cues to equalize weight bearing and ext in B LE  Sit to stand x 10 no UE for support  Lat walking x 30' ea yellow TB ankles  Step down x 10 2'' , x 10 4''  LAQ x 10 5 sec hold 2#  SLR x 10 5 sec hold 2#  Side lying hip abd x 10 5 sec hold 2#  Hip abd with bridges x 10 5 sec hold Red TB  Quad lifts in long sit x 10 ea 5 sec hold   TE x 30 min  Bike x 5 min  Standing TKE with ball x10 5 sec hold   DL press x 10 5 cords  SL press x 10 4 cords   LAQ x 10 5 sec hold 3#  Seated HS curl x 10 5 sec hold red TB  NMR x 15 min  Standing alignment education focused on neutral knees in frontal and sagittal planes x 3 min  Step down x 10 ea  SLS on ground 2 x 30 sec  Sit to stand with band for abd x 10 with ecc focus   TE x 45 min  Bike x 10 min  LAQ x 10 5 sec holds 5#  Seated HS curl x 10 5 sec hold green TB  Sidesteps x 40' green TB  Monster walks x 40' green TB  DL press x 10 5 cords with green TB around knees for abd  SL press x 10 5 cords  SLB 2 x 30\" ea  Step down x 10 hallway stairs   SL bridge 2 x 10 ea                                   Charges: TE:3       Total Timed Treatment: 45 min  Total Treatment Time: 45 min

## 2023-12-12 ENCOUNTER — HOSPITAL ENCOUNTER (INPATIENT)
Facility: HOSPITAL | Age: 73
LOS: 3 days | Discharge: HOME HEALTH | DRG: 698 | End: 2023-12-15
Attending: EMERGENCY MEDICINE | Admitting: INTERNAL MEDICINE
Payer: MEDICARE

## 2023-12-12 ENCOUNTER — APPOINTMENT (EMERGENCY)
Dept: CT IMAGING | Facility: HOSPITAL | Age: 73
DRG: 698 | End: 2023-12-12
Payer: MEDICARE

## 2023-12-12 DIAGNOSIS — D72.829 LEUCOCYTOSIS: ICD-10-CM
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
## 2023-12-12 DIAGNOSIS — N39.0 UTI (URINARY TRACT INFECTION): ICD-10-CM

## 2023-12-12 PROBLEM — R82.90 ABNORMAL URINALYSIS: Status: ACTIVE | Noted: 2023-12-12

## 2023-12-12 PROBLEM — R53.1 WEAKNESS: Status: ACTIVE | Noted: 2023-12-12

## 2023-12-12 PROBLEM — J18.9 PNEUMONIA: Status: ACTIVE | Noted: 2023-12-12

## 2023-12-12 LAB
2HR DELTA HS TROPONIN: -1 NG/L
4HR DELTA HS TROPONIN: -1 NG/L
ALBUMIN SERPL BCP-MCNC: 3.7 G/DL (ref 3.5–5)
ALP SERPL-CCNC: 68 U/L (ref 34–104)
ALT SERPL W P-5'-P-CCNC: 8 U/L (ref 7–52)
ANION GAP SERPL CALCULATED.3IONS-SCNC: 8 MMOL/L
APTT PPP: 29 SECONDS (ref 23–37)
AST SERPL W P-5'-P-CCNC: 10 U/L (ref 13–39)
ATRIAL RATE: 90 BPM
ATRIAL RATE: 91 BPM
ATRIAL RATE: 92 BPM
BACTERIA UR QL AUTO: ABNORMAL /HPF
BASOPHILS # BLD AUTO: 0.12 THOUSANDS/ÂΜL (ref 0–0.1)
BASOPHILS NFR BLD AUTO: 1 % (ref 0–1)
BILIRUB SERPL-MCNC: 0.81 MG/DL (ref 0.2–1)
BILIRUB UR QL STRIP: NEGATIVE
BNP SERPL-MCNC: 57 PG/ML (ref 0–100)
BUN SERPL-MCNC: 10 MG/DL (ref 5–25)
CALCIUM SERPL-MCNC: 10.3 MG/DL (ref 8.4–10.2)
CARDIAC TROPONIN I PNL SERPL HS: 6 NG/L
CARDIAC TROPONIN I PNL SERPL HS: 6 NG/L
CARDIAC TROPONIN I PNL SERPL HS: 7 NG/L
CHLORIDE SERPL-SCNC: 98 MMOL/L (ref 96–108)
CLARITY UR: ABNORMAL
CO2 SERPL-SCNC: 26 MMOL/L (ref 21–32)
COLOR UR: COLORLESS
CREAT SERPL-MCNC: 1.05 MG/DL (ref 0.6–1.3)
EOSINOPHIL # BLD AUTO: 0.42 THOUSAND/ÂΜL (ref 0–0.61)
EOSINOPHIL NFR BLD AUTO: 2 % (ref 0–6)
ERYTHROCYTE [DISTWIDTH] IN BLOOD BY AUTOMATED COUNT: 14.7 % (ref 11.6–15.1)
FLUAV RNA RESP QL NAA+PROBE: NEGATIVE
FLUBV RNA RESP QL NAA+PROBE: NEGATIVE
GFR SERPL CREATININE-BSD FRML MDRD: 69 ML/MIN/1.73SQ M
GLUCOSE SERPL-MCNC: 131 MG/DL (ref 65–140)
GLUCOSE UR STRIP-MCNC: ABNORMAL MG/DL
HCT VFR BLD AUTO: 43.1 % (ref 36.5–49.3)
HGB BLD-MCNC: 14.3 G/DL (ref 12–17)
HGB UR QL STRIP.AUTO: ABNORMAL
IMM GRANULOCYTES # BLD AUTO: 0.13 THOUSAND/UL (ref 0–0.2)
IMM GRANULOCYTES NFR BLD AUTO: 1 % (ref 0–2)
INR PPP: 1.01 (ref 0.84–1.19)
KETONES UR STRIP-MCNC: NEGATIVE MG/DL
LACTATE SERPL-SCNC: 2 MMOL/L (ref 0.5–2)
LEUKOCYTE ESTERASE UR QL STRIP: ABNORMAL
LYMPHOCYTES # BLD AUTO: 2.42 THOUSANDS/ÂΜL (ref 0.6–4.47)
LYMPHOCYTES NFR BLD AUTO: 11 % (ref 14–44)
MCH RBC QN AUTO: 29.4 PG (ref 26.8–34.3)
MCHC RBC AUTO-ENTMCNC: 33.2 G/DL (ref 31.4–37.4)
MCV RBC AUTO: 89 FL (ref 82–98)
MONOCYTES # BLD AUTO: 1.75 THOUSAND/ÂΜL (ref 0.17–1.22)
MONOCYTES NFR BLD AUTO: 8 % (ref 4–12)
NEUTROPHILS # BLD AUTO: 16.55 THOUSANDS/ÂΜL (ref 1.85–7.62)
NEUTS SEG NFR BLD AUTO: 77 % (ref 43–75)
NITRITE UR QL STRIP: NEGATIVE
NON-SQ EPI CELLS URNS QL MICRO: ABNORMAL /HPF
NRBC BLD AUTO-RTO: 0 /100 WBCS
P AXIS: 55 DEGREES
P AXIS: 63 DEGREES
P AXIS: 70 DEGREES
PH UR STRIP.AUTO: 5 [PH]
PLATELET # BLD AUTO: 361 THOUSANDS/UL (ref 149–390)
PMV BLD AUTO: 8.1 FL (ref 8.9–12.7)
POTASSIUM SERPL-SCNC: 4.2 MMOL/L (ref 3.5–5.3)
PR INTERVAL: 162 MS
PR INTERVAL: 168 MS
PR INTERVAL: 168 MS
PROCALCITONIN SERPL-MCNC: 0.15 NG/ML
PROT SERPL-MCNC: 6.8 G/DL (ref 6.4–8.4)
PROT UR STRIP-MCNC: NEGATIVE MG/DL
PROTHROMBIN TIME: 13.5 SECONDS (ref 11.6–14.5)
QRS AXIS: 69 DEGREES
QRS AXIS: 69 DEGREES
QRS AXIS: 70 DEGREES
QRSD INTERVAL: 86 MS
QRSD INTERVAL: 88 MS
QRSD INTERVAL: 90 MS
QT INTERVAL: 366 MS
QT INTERVAL: 376 MS
QT INTERVAL: 380 MS
QTC INTERVAL: 450 MS
QTC INTERVAL: 459 MS
QTC INTERVAL: 469 MS
RBC # BLD AUTO: 4.86 MILLION/UL (ref 3.88–5.62)
RBC #/AREA URNS AUTO: ABNORMAL /HPF
RSV RNA RESP QL NAA+PROBE: NEGATIVE
SARS-COV-2 RNA RESP QL NAA+PROBE: NEGATIVE
SODIUM SERPL-SCNC: 132 MMOL/L (ref 135–147)
SP GR UR STRIP.AUTO: 1.01 (ref 1–1.03)
T WAVE AXIS: 33 DEGREES
T WAVE AXIS: 36 DEGREES
T WAVE AXIS: 48 DEGREES
UROBILINOGEN UR STRIP-ACNC: <2 MG/DL
VENTRICULAR RATE: 90 BPM
VENTRICULAR RATE: 91 BPM
VENTRICULAR RATE: 92 BPM
WBC # BLD AUTO: 21.39 THOUSAND/UL (ref 4.31–10.16)
WBC #/AREA URNS AUTO: ABNORMAL /HPF
WBC CLUMPS # UR AUTO: PRESENT /UL

## 2023-12-12 PROCEDURE — 83880 ASSAY OF NATRIURETIC PEPTIDE: CPT | Performed by: EMERGENCY MEDICINE

## 2023-12-12 PROCEDURE — 80053 COMPREHEN METABOLIC PANEL: CPT | Performed by: EMERGENCY MEDICINE

## 2023-12-12 PROCEDURE — 84484 ASSAY OF TROPONIN QUANT: CPT | Performed by: EMERGENCY MEDICINE

## 2023-12-12 PROCEDURE — 99285 EMERGENCY DEPT VISIT HI MDM: CPT | Performed by: EMERGENCY MEDICINE

## 2023-12-12 PROCEDURE — 87077 CULTURE AEROBIC IDENTIFY: CPT | Performed by: EMERGENCY MEDICINE

## 2023-12-12 PROCEDURE — 87040 BLOOD CULTURE FOR BACTERIA: CPT | Performed by: EMERGENCY MEDICINE

## 2023-12-12 PROCEDURE — 0241U HB NFCT DS VIR RESP RNA 4 TRGT: CPT | Performed by: PHYSICIAN ASSISTANT

## 2023-12-12 PROCEDURE — 84145 PROCALCITONIN (PCT): CPT | Performed by: PHYSICIAN ASSISTANT

## 2023-12-12 PROCEDURE — 99285 EMERGENCY DEPT VISIT HI MDM: CPT

## 2023-12-12 PROCEDURE — 81001 URINALYSIS AUTO W/SCOPE: CPT | Performed by: EMERGENCY MEDICINE

## 2023-12-12 PROCEDURE — 74176 CT ABD & PELVIS W/O CONTRAST: CPT

## 2023-12-12 PROCEDURE — 72125 CT NECK SPINE W/O DYE: CPT

## 2023-12-12 PROCEDURE — 93005 ELECTROCARDIOGRAM TRACING: CPT

## 2023-12-12 PROCEDURE — 71250 CT THORAX DX C-: CPT

## 2023-12-12 PROCEDURE — 85730 THROMBOPLASTIN TIME PARTIAL: CPT | Performed by: EMERGENCY MEDICINE

## 2023-12-12 PROCEDURE — 83605 ASSAY OF LACTIC ACID: CPT | Performed by: EMERGENCY MEDICINE

## 2023-12-12 PROCEDURE — 70450 CT HEAD/BRAIN W/O DYE: CPT

## 2023-12-12 PROCEDURE — 87186 SC STD MICRODIL/AGAR DIL: CPT | Performed by: EMERGENCY MEDICINE

## 2023-12-12 PROCEDURE — 87086 URINE CULTURE/COLONY COUNT: CPT | Performed by: EMERGENCY MEDICINE

## 2023-12-12 PROCEDURE — 99223 1ST HOSP IP/OBS HIGH 75: CPT | Performed by: PHYSICIAN ASSISTANT

## 2023-12-12 PROCEDURE — 85610 PROTHROMBIN TIME: CPT | Performed by: EMERGENCY MEDICINE

## 2023-12-12 PROCEDURE — 96365 THER/PROPH/DIAG IV INF INIT: CPT

## 2023-12-12 PROCEDURE — 36415 COLL VENOUS BLD VENIPUNCTURE: CPT | Performed by: EMERGENCY MEDICINE

## 2023-12-12 PROCEDURE — 85025 COMPLETE CBC W/AUTO DIFF WBC: CPT | Performed by: EMERGENCY MEDICINE

## 2023-12-12 RX ORDER — GABAPENTIN 300 MG/1
300 CAPSULE ORAL 2 TIMES DAILY
Status: DISCONTINUED | OUTPATIENT
Start: 2023-12-13 | End: 2023-12-15 | Stop reason: HOSPADM

## 2023-12-12 RX ORDER — AMLODIPINE BESYLATE 10 MG/1
10 TABLET ORAL DAILY
Status: DISCONTINUED | OUTPATIENT
Start: 2023-12-13 | End: 2023-12-15 | Stop reason: HOSPADM

## 2023-12-12 RX ORDER — ONDANSETRON 2 MG/ML
4 INJECTION INTRAMUSCULAR; INTRAVENOUS EVERY 6 HOURS PRN
Status: DISCONTINUED | OUTPATIENT
Start: 2023-12-12 | End: 2023-12-15 | Stop reason: HOSPADM

## 2023-12-12 RX ORDER — CLOPIDOGREL BISULFATE 75 MG/1
75 TABLET ORAL DAILY
Status: DISCONTINUED | OUTPATIENT
Start: 2023-12-13 | End: 2023-12-15 | Stop reason: HOSPADM

## 2023-12-12 RX ORDER — CEFTRIAXONE 1 G/50ML
1000 INJECTION, SOLUTION INTRAVENOUS EVERY 24 HOURS
Status: DISCONTINUED | OUTPATIENT
Start: 2023-12-13 | End: 2023-12-15

## 2023-12-12 RX ORDER — LATANOPROST 50 UG/ML
1 SOLUTION/ DROPS OPHTHALMIC
Status: DISCONTINUED | OUTPATIENT
Start: 2023-12-12 | End: 2023-12-15 | Stop reason: HOSPADM

## 2023-12-12 RX ORDER — GABAPENTIN 300 MG/1
600 CAPSULE ORAL
Status: DISCONTINUED | OUTPATIENT
Start: 2023-12-12 | End: 2023-12-15 | Stop reason: HOSPADM

## 2023-12-12 RX ORDER — ATORVASTATIN CALCIUM 80 MG/1
80 TABLET, FILM COATED ORAL
Status: DISCONTINUED | OUTPATIENT
Start: 2023-12-13 | End: 2023-12-15 | Stop reason: HOSPADM

## 2023-12-12 RX ORDER — ALPRAZOLAM 0.25 MG/1
0.25 TABLET ORAL 2 TIMES DAILY PRN
Status: DISCONTINUED | OUTPATIENT
Start: 2023-12-12 | End: 2023-12-15 | Stop reason: HOSPADM

## 2023-12-12 RX ORDER — SERTRALINE HYDROCHLORIDE 25 MG/1
25 TABLET, FILM COATED ORAL
Status: DISCONTINUED | OUTPATIENT
Start: 2023-12-12 | End: 2023-12-15 | Stop reason: HOSPADM

## 2023-12-12 RX ORDER — PROPRANOLOL HCL 60 MG
60 CAPSULE, EXTENDED RELEASE 24HR ORAL DAILY
Status: DISCONTINUED | OUTPATIENT
Start: 2023-12-13 | End: 2023-12-15 | Stop reason: HOSPADM

## 2023-12-12 RX ORDER — HEPARIN SODIUM 5000 [USP'U]/ML
5000 INJECTION, SOLUTION INTRAVENOUS; SUBCUTANEOUS EVERY 8 HOURS SCHEDULED
Status: DISCONTINUED | OUTPATIENT
Start: 2023-12-12 | End: 2023-12-15 | Stop reason: HOSPADM

## 2023-12-12 RX ORDER — LEVOFLOXACIN 5 MG/ML
750 INJECTION, SOLUTION INTRAVENOUS ONCE
Status: COMPLETED | OUTPATIENT
Start: 2023-12-12 | End: 2023-12-12

## 2023-12-12 RX ORDER — INSULIN LISPRO 100 [IU]/ML
1-5 INJECTION, SOLUTION INTRAVENOUS; SUBCUTANEOUS
Status: DISCONTINUED | OUTPATIENT
Start: 2023-12-12 | End: 2023-12-15 | Stop reason: HOSPADM

## 2023-12-12 RX ADMIN — LEVOFLOXACIN 750 MG: 750 INJECTION, SOLUTION INTRAVENOUS at 20:06

## 2023-12-12 RX ADMIN — SODIUM CHLORIDE 500 ML: 0.9 INJECTION, SOLUTION INTRAVENOUS at 19:51

## 2023-12-13 LAB
ANION GAP SERPL CALCULATED.3IONS-SCNC: 8 MMOL/L
BASOPHILS # BLD AUTO: 0.1 THOUSANDS/ÂΜL (ref 0–0.1)
BASOPHILS NFR BLD AUTO: 1 % (ref 0–1)
BUN SERPL-MCNC: 11 MG/DL (ref 5–25)
CALCIUM SERPL-MCNC: 9.7 MG/DL (ref 8.4–10.2)
CHLORIDE SERPL-SCNC: 101 MMOL/L (ref 96–108)
CO2 SERPL-SCNC: 23 MMOL/L (ref 21–32)
CREAT SERPL-MCNC: 0.9 MG/DL (ref 0.6–1.3)
EOSINOPHIL # BLD AUTO: 0.61 THOUSAND/ÂΜL (ref 0–0.61)
EOSINOPHIL NFR BLD AUTO: 4 % (ref 0–6)
ERYTHROCYTE [DISTWIDTH] IN BLOOD BY AUTOMATED COUNT: 14.6 % (ref 11.6–15.1)
GFR SERPL CREATININE-BSD FRML MDRD: 83 ML/MIN/1.73SQ M
GLUCOSE SERPL-MCNC: 114 MG/DL (ref 65–140)
GLUCOSE SERPL-MCNC: 121 MG/DL (ref 65–140)
GLUCOSE SERPL-MCNC: 125 MG/DL (ref 65–140)
GLUCOSE SERPL-MCNC: 79 MG/DL (ref 65–140)
GLUCOSE SERPL-MCNC: 94 MG/DL (ref 65–140)
HCT VFR BLD AUTO: 39.4 % (ref 36.5–49.3)
HGB BLD-MCNC: 13.2 G/DL (ref 12–17)
IMM GRANULOCYTES # BLD AUTO: 0.05 THOUSAND/UL (ref 0–0.2)
IMM GRANULOCYTES NFR BLD AUTO: 0 % (ref 0–2)
LYMPHOCYTES # BLD AUTO: 3.24 THOUSANDS/ÂΜL (ref 0.6–4.47)
LYMPHOCYTES NFR BLD AUTO: 21 % (ref 14–44)
MCH RBC QN AUTO: 29.5 PG (ref 26.8–34.3)
MCHC RBC AUTO-ENTMCNC: 33.5 G/DL (ref 31.4–37.4)
MCV RBC AUTO: 88 FL (ref 82–98)
MONOCYTES # BLD AUTO: 1.16 THOUSAND/ÂΜL (ref 0.17–1.22)
MONOCYTES NFR BLD AUTO: 8 % (ref 4–12)
NEUTROPHILS # BLD AUTO: 10.21 THOUSANDS/ÂΜL (ref 1.85–7.62)
NEUTS SEG NFR BLD AUTO: 66 % (ref 43–75)
NRBC BLD AUTO-RTO: 0 /100 WBCS
PLATELET # BLD AUTO: 305 THOUSANDS/UL (ref 149–390)
PMV BLD AUTO: 7.9 FL (ref 8.9–12.7)
POTASSIUM SERPL-SCNC: 3.6 MMOL/L (ref 3.5–5.3)
PROCALCITONIN SERPL-MCNC: 0.14 NG/ML
RBC # BLD AUTO: 4.48 MILLION/UL (ref 3.88–5.62)
SODIUM SERPL-SCNC: 132 MMOL/L (ref 135–147)
WBC # BLD AUTO: 15.37 THOUSAND/UL (ref 4.31–10.16)

## 2023-12-13 PROCEDURE — 82948 REAGENT STRIP/BLOOD GLUCOSE: CPT

## 2023-12-13 PROCEDURE — 80048 BASIC METABOLIC PNL TOTAL CA: CPT | Performed by: PHYSICIAN ASSISTANT

## 2023-12-13 PROCEDURE — 97167 OT EVAL HIGH COMPLEX 60 MIN: CPT

## 2023-12-13 PROCEDURE — 97163 PT EVAL HIGH COMPLEX 45 MIN: CPT | Performed by: PHYSICAL THERAPIST

## 2023-12-13 PROCEDURE — 99232 SBSQ HOSP IP/OBS MODERATE 35: CPT | Performed by: INTERNAL MEDICINE

## 2023-12-13 PROCEDURE — 84145 PROCALCITONIN (PCT): CPT | Performed by: PHYSICIAN ASSISTANT

## 2023-12-13 PROCEDURE — 92610 EVALUATE SWALLOWING FUNCTION: CPT

## 2023-12-13 PROCEDURE — 85025 COMPLETE CBC W/AUTO DIFF WBC: CPT | Performed by: PHYSICIAN ASSISTANT

## 2023-12-13 RX ORDER — MICONAZOLE NITRATE 20 MG/G
CREAM TOPICAL 2 TIMES DAILY
Status: DISCONTINUED | OUTPATIENT
Start: 2023-12-13 | End: 2023-12-15 | Stop reason: HOSPADM

## 2023-12-13 RX ORDER — ACETAMINOPHEN 325 MG/1
975 TABLET ORAL EVERY 8 HOURS PRN
Status: DISCONTINUED | OUTPATIENT
Start: 2023-12-13 | End: 2023-12-15 | Stop reason: HOSPADM

## 2023-12-13 RX ADMIN — CLOPIDOGREL BISULFATE 75 MG: 75 TABLET ORAL at 08:28

## 2023-12-13 RX ADMIN — LATANOPROST 1 DROP: 50 SOLUTION/ DROPS OPHTHALMIC at 03:54

## 2023-12-13 RX ADMIN — CEFTRIAXONE 1000 MG: 1 INJECTION, SOLUTION INTRAVENOUS at 21:19

## 2023-12-13 RX ADMIN — LATANOPROST 1 DROP: 50 SOLUTION/ DROPS OPHTHALMIC at 21:19

## 2023-12-13 RX ADMIN — ATORVASTATIN CALCIUM 80 MG: 80 TABLET, FILM COATED ORAL at 16:54

## 2023-12-13 RX ADMIN — HEPARIN SODIUM 5000 UNITS: 5000 INJECTION INTRAVENOUS; SUBCUTANEOUS at 21:19

## 2023-12-13 RX ADMIN — GABAPENTIN 600 MG: 300 CAPSULE ORAL at 21:19

## 2023-12-13 RX ADMIN — HEPARIN SODIUM 5000 UNITS: 5000 INJECTION INTRAVENOUS; SUBCUTANEOUS at 16:54

## 2023-12-13 RX ADMIN — GABAPENTIN 300 MG: 300 CAPSULE ORAL at 08:28

## 2023-12-13 RX ADMIN — SERTRALINE HYDROCHLORIDE 25 MG: 25 TABLET ORAL at 03:04

## 2023-12-13 RX ADMIN — SERTRALINE HYDROCHLORIDE 25 MG: 25 TABLET ORAL at 21:19

## 2023-12-13 RX ADMIN — ALPRAZOLAM 0.25 MG: 0.25 TABLET ORAL at 03:04

## 2023-12-13 RX ADMIN — ACETAMINOPHEN 325MG 975 MG: 325 TABLET ORAL at 03:53

## 2023-12-13 RX ADMIN — HEPARIN SODIUM 5000 UNITS: 5000 INJECTION INTRAVENOUS; SUBCUTANEOUS at 03:04

## 2023-12-13 RX ADMIN — MICONAZOLE NITRATE: 20 CREAM TOPICAL at 16:57

## 2023-12-13 RX ADMIN — GABAPENTIN 300 MG: 300 CAPSULE ORAL at 16:54

## 2023-12-13 RX ADMIN — GABAPENTIN 600 MG: 300 CAPSULE ORAL at 03:04

## 2023-12-13 NOTE — OCCUPATIONAL THERAPY NOTE
Occupational Therapy Evaluation     Patient Name: Fam Moore  BQTUO'M Date: 12/13/2023  Problem List  Principal Problem:    Fall  Active Problems:    Chronic suprapubic catheter ([address])    Primary hypertension    Multiple sclerosis ([address])    Type 2 diabetes mellitus with hyperglycemia, without long-term current use of insulin (HCC)    History of CVA (cerebrovascular accident)    Chronic diastolic congestive heart failure (HCC)    Leucocytosis    Weakness    Abnormal urinalysis    Pneumonia    Past Medical History  Past Medical History:   Diagnosis Date    Acute laryngitis     Acute nonsuppurative otitis media, unspecified laterality     Arm weakness     Arthritis     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HCC)     Bladder infection     Bronchitis     Constipation     Cough     Diabetes mellitus (HCC)     Dizziness     Dysfunction of eustachian tube     Erectile dysfunction of non-organic origin     Fatigue     Glaucoma     Hiatal hernia     Hypertension     Imbalance     Leg muscle spasm     MS (multiple sclerosis) ([facility])     Nephrolithiasis     Neurogenic bladder     No natural teeth     Sinus pain     Spinal stenosis     Strain of thoracic region     Stroke [facility])     Suprapubic catheter ([address])      Past Surgical History  Past Surgical History:   Procedure Laterality Date    APPENDECTOMY      BRAIN SURGERY      Coil placed in aneurysm    CYSTOSCOPY      CYSTOSCOPY      CYSTOSCOPY  06/11/2018    CYSTOSCOPY  01/15/2021    EYE SURGERY      transscleral cyclophotocoagulation noncontact YAG laser    MYRINGOTOMY      with ventilation tube insertion    OR LITHOLAPAXY SMPL/SM <2.5 CM N/A 5/7/2019    Procedure: CYSTOSCOPY, holmium laser litholapaxy of bladder stones, EXCHANGE OF SP TUBE;  Surgeon: Fam Miranda MD;  Location: [facility];  Service: Urology    SUPRAPUBIC CATHETER INSERTION             12/13/23 2472   OT Last Visit   OT Visit Date 12/13/23   Note Type   Note type Evaluation   Pain Assessment   Pain Assessment Tool 0-10   Pain Score 10 - Worst Possible Pain   Pain Location/Orientation Orientation: Bilateral;Location: Hip;Location: Leg   Patient's Stated Pain Goal No pain   Hospital Pain Intervention(s) Repositioned; Ambulation/increased activity; Emotional support; Rest   Multiple Pain Sites No   Restrictions/Precautions   Weight Bearing Precautions Per Order No   Other Precautions Contact/isolation;Cognitive; Chair Alarm; Bed Alarm; Fall Risk;Pain;Multiple lines   Home Living   Type of [address] [place] [address] Dr One level;Ramped entrance;Performs ADLs on one level; Able to live on main level with bedroom/bathroom   Bathroom Shower/Tub Walk-in shower  (Pt reports sponge bathing at baseline)   Bathroom Toilet   (Pt reports standard height toilet, previous notes indicate raised toilet)   Bathroom Equipment Grab bars in shower; Shower chair;Grab bars around toilet   [address] Walker;Cane;Wheelchair-manual;Hospital bed; Other (Comment)  (Sit<>stand machine)   Additional Comments Pt is poor historian, info on home setup and PLOF obtained via pt and pt's chart. Pt lives with sister and brother in a one level house with ramped entrance. Pt reports having HHA 7 days/wk for "a few hours."   Prior Function   Level of [place] Needs assistance with ADLs; Needs assistance with functional mobility; Needs assistance with IADLS   Lives With Family  (Brother, sister)   Receives Help From Family; Other (Comment)  (HHA- pt reports HHA 7 days/wk for a few hours. Pt also reports attending [employer] [facility] 1x/wk)   IADLs Family/Friend/Other provides transportation; Family/Friend/Other provides meals; Family/Friend/Other provides medication management   Falls in the last 6 months 1 to 4  (at least 1 leading to admission)   Vocational Retired   Comments At baseline, pt reports requiring assist w/ ADLs, IADLs, and functional transfers. Pt reports using sit<>stand machine for transfers to/from W/C with Ax2.  (-) . (+) fall PTA.   Lifestyle   Autonomy At baseline, pt reports requiring assist w/ ADLs, IADLs, and functional transfers. Pt reports using sit<>stand machine for transfers to/from W/C with Ax2. (-) . (+) fall PTA. Reciprocal Relationships Brother, sister   Service to Others Retired   ADL   Where Assessed Edge of bed   Eating Assistance 5  Supervision/Setup   Grooming Assistance 4  Minimal Assistance   [address] 2  Maximal Assistance    N [facility] 4  [address] [facility] 2  Maximal [facility] 2  Maximal [address]  2  Maximal [facility] 2  Maximal Assistance   Bed Mobility   Supine to Sit 1  Dependent   Additional items Assist x 2; Increased time required;Verbal cues;LE management   Sit to Supine 1  Dependent   Additional items Assist x 2; Increased time required;Verbal cues;LE management   Additional Comments Pt lying supine with bed alarm activated at end of session. Call bell and phone within reach. All needs met and pt reports no further questions for OT at this time. Transfers   Sit to Stand 2  Maximal assistance   Additional items Assist x 2; Increased time required;Verbal cues  (assist to block B/L knees)   Stand to Sit 2  Maximal assistance   Additional items Assist x 2; Increased time required;Verbal cues   Additional Comments +Retropulsion. Cues for safe technique and hand placement   Functional Mobility   Additional Comments N/A. Pt reports non-ambulatory at baseline. Completes transfers w/ use of sit<>stand machine and Ax2   Balance   Static Sitting Fair -   Dynamic Sitting Poor +   Static Standing Zero   Activity Tolerance   Activity Tolerance Patient limited by pain;Treatment limited secondary to medical complications (Comment)   Medical Staff Made Aware Maria T Rojas, PT; Jing Rodarte, PT   Nurse Made Aware yes;  GYPSY Pagan   RUE Assessment   RUE Assessment WFL  (3+/5 throughout)   LUE Assessment   LUE Assessment WFL  (3+/5 throughout)   Hand Function   Gross Motor Coordination Functional   Fine Motor Coordination Impaired   Sensation   Light Touch No apparent deficits   Proprioception   Proprioception No apparent deficits   Vision-Basic Assessment   Visual History Glaucoma   Patient Visual Report Diplopia   Vision - Complex Assessment   Additional Comments Pt reports overall poor acuity and diplopia at baseline. Able to identify 2/2 digits held up by therapist   Psychosocial   Psychosocial (WDL) WDL   Perception   Inattention/Neglect Appears intact   Cognition   Overall Cognitive Status Impaired   Arousal/Participation Alert; Cooperative   Attention Attends with cues to redirect   Orientation Level Oriented to person;Oriented to place; Disoriented to time  (General to time- Able to state December. Unable to state date/year)   Memory Decreased recall of precautions;Decreased recall of recent events   Following Commands Follows one step commands with increased time or repetition   Comments Inconsistent report of PLOF. Limited insight into deficits   Assessment   Limitation Decreased ADL status; Decreased UE strength;Decreased endurance;Decreased fine motor control;Decreased self-care trans;Decreased high-level ADLs   Prognosis Fair   Assessment Pt is a 75 y.o. male seen for OT evaluation s/p adm to [facility] on 12/12/2023 w/ Fall, PNA, Leucocytosis. Comorbidities affecting pt’s functional performance include a significant PMH of MS, CVA, HTN, Arthritis, DM, Glaucoma, Neurogenic bladder, Spinal stenosis. Pt with active OT orders and activity orders for Up and OOB as tolerated. Pt is poor historian, info on home setup and PLOF obtained via pt and pt's chart. Pt lives with sister and brother in a one level house with ramped entrance. Pt reports having HHA 7 days/wk for "a few hours." At baseline, pt reports requiring assist w/ ADLs, IADLs, and functional transfers.  Pt reports using sit<>stand machine for transfers to/from W/C with Ax2. (-) . (+) fall PTA. Upon evaluation, pt currently requires Min A for UB ADLs, Max A for LB ADLs, Max A for toileting, Dependent assist of 2 for bed mobility, and Max A of 2 for functional transfers 2* the following deficits impacting occupational performance: decreased strength , decreased balance, decreased activity tolerance, limited functional reach, impaired [place], impaired memory, decreased safety awareness, visual deficits, and decreased postural control. These impairments, as well at pt’s personal factors of: limited home support, difficulty performing ADLs, difficulty performing transfers/mobility, limited insight into deficits, fall risk , and functional decline  limit pt’s ability to safely engage in all baseline areas of occupation. Pt to continue to benefit from continued acute OT services during hospital stay to address defined deficits and to maximize level of functional independence in the following Occupational Performance areas: eating, grooming, bathing/shower, toilet hygiene, dressing, health maintenance, functional mobility, community mobility, clothing management, and social participation. The patient's raw score on the -PAC Daily Activity Inpatient Short Form is 15. A raw score of less than 19 suggests the patient may benefit from discharge to post-acute rehabilitation services. Please refer to the recommendation of the Occupational Therapist for safe discharge planning. OT will continue to follow pt 2-3x/wk to address the following goals to  w/in 10-14 days:   Goals   Patient Goals To go home at D/C   LTG Time Frame 10-14   Long Term Goal Please refer to LTGs listed below   Plan   Treatment Interventions ADL retraining;Functional transfer training;UE strengthening/ROM; Endurance training;Patient/family training;Equipment evaluation/education; Compensatory technique education;Continued evaluation; Activityengagement   Goal Expiration Date 23   OT Treatment Day 0   OT Frequency 2-3x/wk   Discharge Recommendation   Rehab Resource Intensity Level, OT II (Moderate Resource Intensity)  (STR vs HHOT pending pt's baseline level of functioning and available home support.  Pt is poor historian, ?accuracy of info provided during evaluation)   AM-PAC Daily Activity Inpatient   Lower Body Dressing 2   Bathing 2   Toileting 2   Upper Body Dressing 3   Grooming 3   Eating 3   Daily Activity Raw Score 15   Daily Activity Standardized Score (Calc for Raw Score >=11) 34.69   AM-PAC Applied Cognition Inpatient   Following a Speech/Presentation 3   Understanding Ordinary Conversation 3   Taking Medications 2   Remembering Where Things Are Placed or Put Away 2   Remembering List of 4-5 Errands 2   Taking Care of Complicated Tasks 2   Applied Cognition Raw Score 14   Applied Cognition Standardized Score 32.02        GOALS    Pt will improve activity tolerance to G for min 30 min txment sessions for increase engagement in functional tasks    Pt will complete bed mobility at a Min A level w/ G balance/safety demonstrated to decrease caregiver assistance required     Pt will complete UB dressing/self care w/ mod I using adaptive device and DME as needed     Pt will complete LB dressing/self care w/ min A using adaptive device and DME as needed    Pt will complete toileting w/ min A w/ G hygiene/thoroughness using DME as needed    Pt will improve functional transfers to Min A on/off all surfaces using DME as needed w/ G balance/safety     Pt will tolerate continued functional mobility assessment and appropriate goals will be established by OTR as applicable     Pt will be attentive 100% of the time during ongoing cognitive assessment w/ G participation to assist w/ safe d/c planning/recommendations    Pt will demonstrate G carryover of pt/caregiver education and training as appropriate w/o cues w/ good tolerance to increase safety during functional tasks    Pt will increase BUE strength by 1MM grade via AROM/AAROM/PROM exercises to increase independence in ADLs and transfers    Pt will verbalize 3 potential fall hazards and identify appropriate compensatory techniques to decrease fall risk in home environment     Pt will increase standing tolerance to 3-5 mins with Poor+ dynamic standing balance to increase safety during participation in ADLs     Pt will demonstrate ability to perform pressure relief techniques (ie: weight shifts, frequent changes in position, placement of positioning devices- pillows, wedges, etc) at a Min A level after education from therapist       ASHELY Arce/TROY

## 2023-12-13 NOTE — PLAN OF CARE
Problem: Potential for Falls  Goal: Patient will remain free of falls  Description: INTERVENTIONS:  - Educate patient/family on patient safety including physical limitations  - Instruct patient to call for assistance with activity   - Consult OT/PT to assist with strengthening/mobility   - Keep Call bell within reach  - Keep bed low and locked with side rails adjusted as appropriate  - Keep care items and personal belongings within reach  - Initiate and maintain comfort rounds  - Make Fall Risk Sign visible to staff  - Offer Toileting every 2 Hours, in advance of need  - Initiate/Maintain bed alarm  - Obtain necessary fall risk management equipment: alarm   - Apply yellow socks and bracelet for high fall risk patients  - Consider moving patient to room near nurses station  Outcome: Progressing     Problem: PAIN - ADULT  Goal: Verbalizes/displays adequate comfort level or baseline comfort level  Description: Interventions:  - Encourage patient to monitor pain and request assistance  - Assess pain using appropriate pain scale  - Administer analgesics based on type and severity of pain and evaluate response  - Implement non-pharmacological measures as appropriate and evaluate response  - Consider cultural and social influences on pain and pain management  - Notify physician/advanced practitioner if interventions unsuccessful or patient reports new pain  Outcome: Progressing     Problem: INFECTION - ADULT  Goal: Absence or prevention of progression during hospitalization  Description: INTERVENTIONS:  - Assess and monitor for signs and symptoms of infection  - Monitor lab/diagnostic results  - Monitor all insertion sites, i.e. indwelling lines, tubes, and drains  - Monitor endotracheal if appropriate and nasal secretions for changes in amount and color  - [place] appropriate cooling/warming therapies per order  - Administer medications as ordered  - Instruct and encourage patient and family to use good hand hygiene technique  - Identify and instruct in appropriate isolation precautions for identified infection/condition  Outcome: Progressing  Goal: Absence of fever/infection during neutropenic period  Description: INTERVENTIONS:  - Monitor WBC    Outcome: Progressing     Problem: SAFETY ADULT  Goal: Patient will remain free of falls  Description: INTERVENTIONS:  - Educate patient/family on patient safety including physical limitations  - Instruct patient to call for assistance with activity   - Consult OT/PT to assist with strengthening/mobility   - Keep Call bell within reach  - Keep bed low and locked with side rails adjusted as appropriate  - Keep care items and personal belongings within reach  - Initiate and maintain comfort rounds  - Make Fall Risk Sign visible to staff  - Offer Toileting every 2 Hours, in advance of need  - Initiate/Maintain bed alarm  - Obtain necessary fall risk management equipment: alarm   - Apply yellow socks and bracelet for high fall risk patients  - Consider moving patient to room near nurses station  Outcome: Progressing  Goal: Maintain or return to baseline ADL function  Description: INTERVENTIONS:  -  Assess patient's ability to carry out ADLs; assess patient's baseline for ADL function and identify physical deficits which impact ability to perform ADLs (bathing, care of mouth/teeth, toileting, grooming, dressing, etc.)  - Assess/evaluate cause of self-care deficits   - Assess range of motion  - Assess patient's mobility; develop plan if impaired  - Assess patient's need for assistive devices and provide as appropriate  - Encourage maximum independence but intervene and supervise when necessary  - Involve family in performance of ADLs  - Assess for home care needs following discharge   - Consider OT consult to assist with ADL evaluation and planning for discharge  - Provide patient education as appropriate  Outcome: Progressing  Goal: Maintains/Returns to pre admission functional level  Description: INTERVENTIONS:  - Perform AM-PAC 6 Click Basic Mobility/ Daily Activity assessment daily.  - Set and communicate daily mobility goal to care team and patient/family/caregiver. - Collaborate with rehabilitation services on mobility goals if consulted  - Perform Range of Motion  times a day. - Reposition patient every  hours. - Dangle patient  times a day  - Stand patient  times a day  - Ambulate patient  times a day  - Out of bed to chair  times a day   - Out of bed for meals  times a day  - Out of bed for toileting  - Record patient progress and toleration of activity level   Outcome: Progressing     Problem: DISCHARGE PLANNING  Goal: Discharge to home or other facility with appropriate resources  Description: INTERVENTIONS:  - Identify barriers to discharge w/patient and caregiver  - Arrange for needed discharge resources and transportation as appropriate  - Identify discharge learning needs (meds, wound care, etc.)  - Arrange for interpretive services to assist at discharge as needed  - Refer to Case Management Department for coordinating discharge planning if the patient needs post-hospital services based on physician/advanced practitioner order or complex needs related to functional status, cognitive ability, or social support system  Outcome: Progressing     Problem: Knowledge Deficit  Goal: Patient/family/caregiver demonstrates understanding of disease process, treatment plan, medications, and discharge instructions  Description: Complete learning assessment and assess knowledge base.   Interventions:  - Provide teaching at level of understanding  - Provide teaching via preferred learning methods  Outcome: Progressing     Problem: Prexisting or High Potential for Compromised Skin Integrity  Goal: Skin integrity is maintained or improved  Description: INTERVENTIONS:  - Identify patients at risk for skin breakdown  - Assess and monitor skin integrity  - Assess and monitor nutrition and hydration status  - Monitor labs   - Assess for incontinence   - Turn and reposition patient  - Assist with mobility/ambulation  - Relieve pressure over bony prominences  - Avoid friction and shearing  - Provide appropriate hygiene as needed including keeping skin clean and dry  - Evaluate need for skin moisturizer/barrier cream  - Collaborate with interdisciplinary team   - Patient/family teaching  - Consider wound care consult   Outcome: Progressing

## 2023-12-13 NOTE — H&P
[facility]  H&P  Name: Elia Blue 75 y.o. male I MRN: [id number]  Unit/Bed#: E4 -01 I Date of Admission: 12/12/2023   Date of Service: 12/12/2023 I Hospital Day: 0      Assessment/Plan   * Fall  Assessment & Plan  Fall out of bed at home with generalized asthenia  -CT head C-spine recon TL spine negative for fracture or intracranial hemorrhage  -Likely contributed to by underlying stroke/MS as well as infection either urinary versus possible pneumonia on CT  -Consult PT OT, check orthostatics, gentle ivf    Pneumonia  Assessment & Plan  Vs pneumonitis suspected on LLL ct chest w/tree in bud opacities. -Given dependent location and fall w/asthenia in the setting of MS/CVA r/o aspiration  -check procalcitonin for bacterial LRTI, abx as above for possible UTI  -check flu/covid/rsv. If negative will ask for SLP for bedside swallow evaluation    Abnormal urinalysis  Assessment & Plan  Suspect uti 2* suprapubic catheter and hx of bladder neck contracture with recurrent utis given hematuria (10-20 rbcs/hpf on plavix, innumerable wbcs/hpf) although only occasional bacteria/hpf.   Has grown Klebsiella strains E coli and E faecalis largely susceptible to rocephin (ancef resistance)  -empiric rocephin for now as noted below for leucocytosis  -f/u ucx    Leucocytosis  Assessment & Plan  Significant leucocytosis in the setting of chronic suprapubic catheter w/abnormal ua and tree in bud nodular opacities in LLL on the basis of infectious/inflammatory etiology  -ua w/10-20 rbcs innumerable wbcs and occasional bacteria ucx pending on plavix  -rec'd levaquin in ed to keflex allergy (rash 01/2019 but tolerated rocephin and ampicillin in 08/23) for pyelonephritis  -check procalcitonin for LRTI given tree in bud opacities, flu rsv covid  -transition to iv rocephin for now, f/u ucx, bcx procalcitonin      Chronic diastolic congestive heart failure (HCC)  Assessment & Plan  Wt Readings from Last 3 Encounters:   09/18/23 76.9 kg (169 lb 8.5 oz)   08/28/23 86.2 kg (190 lb)   08/19/23 72.3 kg (159 lb 6.3 oz)       Check weight, daily weights I/os  Continue propranolol, hold lasix for now          History of CVA (cerebrovascular accident)  Assessment & Plan  Continue plavix/statin    Type 2 diabetes mellitus with hyperglycemia, without long-term current use of insulin [facility])  Assessment & Plan    Lab Results   Component Value Date    HGBA1C 8.1 (H) 12/04/2023   Poorly controlled. Hold trulicity  Start ssi/poc bs    Primary hypertension  Assessment & Plan  Continue propranolol/norvasc from [place] and hold lasix    Chronic suprapubic catheter ([address])  Assessment & Plan  W/hx of bladder neck contracture             VTE Pharmacologic Prophylaxis: VTE Score: 4 High Risk (Score >/= 5) - Pharmacological DVT Prophylaxis Ordered: heparin. Sequential Compression Devices Ordered. Code Status: fc  Discussion with family: Attempted to update  (sister and brother) via phone. Left voicemail. Anticipated Length of Stay: Patient will be admitted on an inpatient basis with an anticipated length of stay of greater than 2 midnights secondary to fall and uti. Total Time Spent on Date of Encounter in care of patient:  mins. This time was spent on one or more of the following: performing physical exam; counseling and coordination of care; obtaining or reviewing history; documenting in the medical record; reviewing/ordering tests, medications or procedures; communicating with other healthcare professionals and discussing with patient's family/caregivers. Chief Complaint: fall asthenia    History of Present Illness:  Marysol Florez is a 75 y.o. male with a PMH of MS, hx of cva nonambulatory status, htn niddmwho presents with fall at home. Pt is a somewhat poor historian. Hpi constructed by d/w pt and review of emr. Attempted to call brother/sister with whom pt lives. VM left.   Pt notes he did recently have some bad n/v which has since resolved but otherwise was in his normal state of health when on the day of admission he attempted to get out of bed and fell to the ground. This mechanism is rather poorly explained but pt was down for a short amount of time before his sister/brother called EMS for fire to help get him up and to the hospital.  His ROS is roughly equivocal and positive for most things. He reports fatigue/weakness sometimes and myalgias sometimes but is hard to tell if its worse than normal presently. Denies any cp/sob thankfully or headache neck pain but reports chronic hip pain in right questionably worsened and does not left hip pain which is new worsened since fall. No groin pain flank pain with chronic suprapubic catheter. Has a cough which is nonproductive and denies sore throat fevers/chills. Endorses occasionally coughing while eating but not worsening recently that he can recall. In ed he was found to have significant leucocytosis w/UA concerning for UTI and ct chest concerning for possible LLL pneumonia/inflammation. Admission was requested. Review of Systems:  Review of Systems   Constitutional:  Positive for fatigue. Negative for chills and fever. Respiratory:  Negative for cough and shortness of breath. Cardiovascular:  Negative for chest pain. Gastrointestinal:  Positive for vomiting. Negative for abdominal pain, diarrhea and nausea. Genitourinary:  Negative for frequency, penile discharge and urgency. Neurological:  Positive for weakness. Negative for light-headedness. All other systems reviewed and are negative.       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HCC)     Bladder infection     Bronchitis     Constipation     Cough     Diabetes mellitus (HCC)     Dizziness     Dysfunction of eustachian tube     Erectile dysfunction of non-organic origin     Fatigue     Glaucoma     Hiatal hernia     Hypertension     Imbalance     Leg muscle spasm     MS (multiple sclerosis) (HCC)     Nephrolithiasis     Neurogenic bladder     No natural teeth     Sinus pain     Spinal stenosis     Strain of thoracic region     Stroke [facility])     Suprapubic catheter [facility])        Past Surgical History:   Procedure Laterality Date    APPENDECTOMY      BRAIN SURGERY      Coil placed in aneurysm    CYSTOSCOPY      CYSTOSCOPY      CYSTOSCOPY  06/11/2018    CYSTOSCOPY  01/15/2021    EYE SURGERY      transscleral cyclophotocoagulation noncontact YAG laser    MYRINGOTOMY      with ventilation tube insertion    KS LITHOLAPAXY SMPL/SM <2.5 CM N/A 5/7/2019    Procedure: CYSTOSCOPY, holmium laser litholapaxy of bladder stones, EXCHANGE OF SP TUBE;  Surgeon: Jamilah Pacheco MD;  Location: [facility];  Service: Urology    SUPRAPUBIC CATHETER INSERTION         Meds/Allergies:  Prior to Admission medications    Medication Sig Start Date End Date Taking?  Authorizing Provider   albuterol (2.5 mg/3 mL) 0.083 % nebulizer solution Take 3 mL (2.5 mg total) by nebulization every 6 (six) hours as needed for wheezing or shortness of breath 10/3/23   Karen Verdugo MD   albuterol (Ventolin HFA) 90 mcg/act inhaler Inhale 2 puffs every 6 (six) hours as needed for wheezing 10/3/23   Karen Verdugo MD   ALPRAZolam Theora Fries) 0.25 mg tablet Take 0.25 mg by mouth 2 (two) times a day as needed for anxiety or sleep     Historical Provider, MD   amLODIPine-atorvastatin (CADUET) 10-80 MG per tablet Take 1 tablet by mouth daily    Historical Provider, MD   clopidogrel (PLAVIX) 75 mg tablet Take 1 tablet (75 mg total) by mouth daily 10/27/18   Tosin Tinajero MD   Dulaglutide (Trulicity) 0.06 TT/9.6YO SOPN Inject 0.75 mg under the skin once a week    Historical Provider, MD   Ergocalciferol (VITAMIN D2 PO) Take 50,000 Units by mouth once a week Historical Provider, MD   furosemide (LASIX) 40 mg tablet Take 40 mg by mouth daily    Historical Provider, MD   gabapentin (NEURONTIN) 300 mg capsule Take 1 capsule (300 mg total) by mouth 2 (two) times a day 6/3/21   Jian Vazquez DO   gabapentin (NEURONTIN) 300 mg capsule Take 2 capsules (600 mg total) by mouth daily at bedtime 6/3/21   Phan Vazquez DO   latanoprost (XALATAN) 0.005 % ophthalmic solution Administer 1 drop to both eyes daily at bedtime    Historical Provider, MD   melatonin 3 mg Take 3 mg by mouth daily at bedtime as needed    Historical Provider, MD   pantoprazole (PROTONIX) 40 mg tablet TAKE 1 TABLET TWICE DAILY 30 MINUTES BEFORE BREAKFAST AND DINNER. 3/13/18   Alric Genin,    polyethylene glycol (MIRALAX) 17 g packet Take 17 g by mouth daily as needed    Historical Provider, MD   potassium chloride (Klor-Con) 10 mEq tablet Take 10 mEq by mouth 2 (two) times a day    Historical Provider, MD   propranolol (INDERAL LA) 60 mg 24 hr capsule Take 60 mg by mouth daily    Historical Provider, MD   senna-docusate sodium (SENOKOT S) 8.6-50 mg per tablet Take 2 tablets by mouth daily at bedtime    Historical Provider, MD   sertraline (ZOLOFT) 25 mg tablet Take 25 mg by mouth daily at bedtime    Historical Provider, MD     I have reviewed home medications with a medical source (PCP, Pharmacy, other). Allergies: Allergies   Allergen Reactions    Cephalexin Rash       Social History:  Marital Status: Single   Occupation:   Patient Pre-hospital Living Situation:   Patient Pre-hospital Level of Mobility: WC mainly. Appears to be able to stand/pivot as well.   Patient Pre-hospital Diet Restrictions:   Substance Use History:   Social History     Substance and Sexual Activity   Alcohol Use Not Currently     Social History     Tobacco Use   Smoking Status Former    Packs/day: 0.50    Years: 31.00    Total pack years: 15.50    Types: Cigarettes    Start date: 1964    Quit date: 1995    Years since quittin.9   Smokeless Tobacco Never     Social History     Substance and Sexual Activity   Drug Use No       Family History:  Family History   Problem Relation Age of Onset    Heart attack Mother     Stroke Mother     Heart attack Father     Anuerysm Father         In Stomach        Physical Exam:     Vitals:   Blood Pressure: 116/62 (23)  Pulse: 71 (23)  Temperature: 97.8 °F (36.6 °C) (23)  Temp Source: Temporal (23)  Respirations: 17 (23)  Height: 5' 7" (170.2 cm) (23)  Weight - Scale: 70.6 kg (155 lb 10.3 oz) (23)  SpO2: 96 % (23)    Physical Exam  Vitals reviewed. Constitutional:       General: He is not in acute distress. Appearance: He is not ill-appearing, toxic-appearing or diaphoretic. HENT:      Head: Normocephalic and atraumatic. Right Ear: External ear normal.      Left Ear: External ear normal.      Nose: Nose normal.   Eyes:      Extraocular Movements: Extraocular movements intact. Cardiovascular:      Rate and Rhythm: Normal rate and regular rhythm. Heart sounds: No murmur heard. No friction rub. No gallop. Pulmonary:      Effort: No respiratory distress. Breath sounds: Rales (LLL fine rales) present. No wheezing or rhonchi. Abdominal:      General: There is no distension. Palpations: Abdomen is soft. There is no mass. Tenderness: There is no abdominal tenderness. There is no guarding or rebound. Hernia: No hernia is present. Musculoskeletal:      Right lower leg: No edema. Left lower leg: No edema. Skin:     General: Skin is warm. Neurological:      Mental Status: He is alert. Mental status is at baseline.           Additional Data:     Lab Results:  Results from last 7 days   Lab Units 23  1720   WBC Thousand/uL 21.39*   HEMOGLOBIN g/dL 14.3   HEMATOCRIT % 43.1   PLATELETS Thousands/uL 361   NEUTROS PCT % 77*   LYMPHS PCT % 11*   MONOS PCT % 8   EOS PCT % 2     Results from last 7 days   Lab Units 12/12/23  1720   SODIUM mmol/L 132*   POTASSIUM mmol/L 4.2   CHLORIDE mmol/L 98   CO2 mmol/L 26   BUN mg/dL 10   CREATININE mg/dL 1.05   ANION GAP mmol/L 8   CALCIUM mg/dL 10.3*   ALBUMIN g/dL 3.7   TOTAL BILIRUBIN mg/dL 0.81   ALK PHOS U/L 68   ALT U/L 8   AST U/L 10*   GLUCOSE RANDOM mg/dL 131     Results from last 7 days   Lab Units 12/12/23  1720   INR  1.01             Results from last 7 days   Lab Units 12/12/23  2123 12/12/23  1956   LACTIC ACID mmol/L  --  2.0   PROCALCITONIN ng/ml 0.15  --        Lines/Drains:  Invasive Devices       Peripheral Intravenous Line  Duration             Peripheral IV 12/12/23 Left Antecubital <1 day              Drain  Duration             Suprapubic Catheter 20 Fr. 170 days                        Imaging: Reviewed radiology reports from this admission including: chest CT scan, abdominal/pelvic CT, and CT head  CT head without contrast   Final Result by Pravin Brown MD (12/12 1812)      No acute intracranial abnormality. Workstation performed: [id number]         CT cervical spine without contrast   Final Result by Pravin Brown MD (12/12 1814)      No cervical spine fracture or traumatic malalignment. Moderate cervical spondylosis. Workstation performed: [id number]         CT chest abdomen pelvis wo contrast   Final Result by Pravin Brown MD (12/12 1826)      No acute traumatic CT findings. Small cluster of tree-in-bud type opacities in the left lower lobe likely infectious/inflammatory in etiology. Probable mild constipation. Mild fecal impaction. Suprapubic catheter. Workstation performed: [id number]         CT recon only thoracolumbar (No Charge)   Final Result by Pravin Brown MD (12/12 1827)      No fracture or traumatic subluxation.                Workstation performed: [id number]             EKG and Other Studies Reviewed on Admission: EKG:     ** Please Note: This note has been constructed using a voice recognition system.  **

## 2023-12-13 NOTE — ASSESSMENT & PLAN NOTE
Wt Readings from Last 3 Encounters:   09/18/23 76.9 kg (169 lb 8.5 oz)   08/28/23 86.2 kg (190 lb)   08/19/23 72.3 kg (159 lb 6.3 oz)       Check weight, daily weights I/os  Continue propranolol, hold lasix for now

## 2023-12-13 NOTE — ASSESSMENT & PLAN NOTE
Neutrophilic leukocytosis on admission  Reactive due to fall versus infection with possible UTI  He has negative  procalcitonin, low suspicion for pneumonia.   Reviewed old records, he has chronic mild leukocytosis

## 2023-12-13 NOTE — ASSESSMENT & PLAN NOTE
Vs pneumonitis suspected on LLL ct chest w/tree in bud opacities. -Given dependent location and fall w/asthenia in the setting of MS/CVA r/o aspiration  -check procalcitonin for bacterial LRTI, abx as above for possible UTI  -check flu/covid/rsv.   If negative will ask for SLP for bedside swallow evaluation

## 2023-12-13 NOTE — UTILIZATION REVIEW
Date: 12/14    Day 3: Has surpassed a 2nd midnight with active treatments and services, which include fall, therapy evals, UTI and PNA on IV antibiotics. Initial Clinical Review    Admission: Date/Time/Statement:   Admission Orders (From admission, onward)       Ordered        12/12/23 2044  INPATIENT ADMISSION  Once                          Orders Placed This Encounter   Procedures    INPATIENT ADMISSION     Standing Status:   Standing     Number of Occurrences:   1     Order Specific Question:   Level of Care     Answer:   Med Surg [16]     Order Specific Question:   Estimated length of stay     Answer:   More than 2 Midnights     Order Specific Question:   Certification     Answer:   I certify that inpatient services are medically necessary for this patient for a duration of greater than two midnights. See H&P and MD Progress Notes for additional information about the patient's course of treatment. ED Arrival Information       Expected   -    Arrival   12/12/2023 16:58    Acuity   Urgent              Means of arrival   Ambulance    Escorted by   [facility] ([address] [place] [address])    Service   Hospitalist    Admission type   Emergency              Arrival complaint   weakness             Chief Complaint   Patient presents with    Fall     BIBA - slipped out of bed hitting head - denies LOC. Reports sore throat and mild weakness. Unknown thinners. Denies CP. +back pain     Initial Presentation: 75 y.o. male presents to the ED via EMS from home with c/o fall while getting OOB, fatigue, weakness, myalgias, no c/o neck pain, worsening R hip pain and L hip pain, nonproductive cough. PMH:  MS, hx of CVA nonambulatory status, HTN, NIDDM, has SPC in place, chronid dHF. In the ED labs - leukocytosis, abnormal UA, low NA. Imaging - no acute injuries, LLL opacities, mild constipation, fecal impaction. Treated with IV fluids, IV antibiotics. On exam rales LLL. Mental status baseline, A&O.   He is admitted to INPATIENT status with Fall - therapy evals, IV fluids, Orthostatics. PNA - SLP eval.  Abnormal UA w/ leukocytosis - urine cultures, IV antibiotics. Chronic dHF - daily wt, hold Lasix     Date: 12/13   Day 2:   Downtrending leukocytosis. Pt is refusing to consider post d/c rehab. Negative procal.  Follow cultures. Continues on IV antibiotics. ED Triage Vitals   Temperature Pulse Respirations Blood Pressure SpO2   12/12/23 1702 12/12/23 1702 12/12/23 1702 12/12/23 1702 12/12/23 1702   97.8 °F (36.6 °C) 96 16 104/56 96 %      Temp Source Heart Rate Source Patient Position - Orthostatic VS BP Location FiO2 (%)   12/12/23 1702 12/12/23 1702 12/12/23 1702 12/12/23 1702 --   Oral Monitor Lying Right arm       Pain Score       12/12/23 2230       No Pain          Wt Readings from Last 1 Encounters:   12/12/23 70.6 kg (155 lb 10.3 oz)     Additional Vital Signs:   Date/Time Temp Pulse Resp BP MAP (mmHg) SpO2 O2 Device Patient Position - Orthostatic VS   12/13/23 0824 -- 88 -- 102/51 -- -- -- --   12/13/23 0741 97.1 °F (36.2 °C) Abnormal  88 18 106/56 73 96 % -- Sitting   12/12/23 2236 97.8 °F (36.6 °C) 71 17 116/62 82 96 % None (Room air) Lying   12/12/23 2141 -- 87 18 106/59 75 97 % None (Room air) Lying   12/12/23 1915 -- 88 20 123/59 85 97 % None (Room air) Lying   12/12/23 1745 -- 94 18 105/56 77 -- -- --     Pertinent Labs/Diagnostic Test Results:     12/12 ECG x3 - Normal sinus rhythm     CT head without contrast   Final Result by Mike Cowan MD (12/12 1812)      No acute intracranial abnormality. CT cervical spine without contrast   Final Result by Mike Cowan MD (12/12 1814)      No cervical spine fracture or traumatic malalignment. Moderate cervical spondylosis. CT chest abdomen pelvis wo contrast   Final Result by Mkie Cowan MD (12/12 1826)      No acute traumatic CT findings.       Small cluster of tree-in-bud type opacities in the left lower lobe likely infectious/inflammatory in etiology. Probable mild constipation. Mild fecal impaction. Suprapubic catheter. CT recon only thoracolumbar (No Charge)   Final Result by Pravin Brown MD (12/12 1827)      No fracture or traumatic subluxation.      Results from last 7 days   Lab Units 12/12/23 2035   SARS-COV-2  Negative     Results from last 7 days   Lab Units 12/13/23  0556 12/12/23  1720   WBC Thousand/uL 15.37* 21.39*   HEMOGLOBIN g/dL 13.2 14.3   HEMATOCRIT % 39.4 43.1   PLATELETS Thousands/uL 305 361   NEUTROS ABS Thousands/µL 10.21* 16.55*         Results from last 7 days   Lab Units 12/13/23  0556 12/12/23  1720   SODIUM mmol/L 132* 132*   POTASSIUM mmol/L 3.6 4.2   CHLORIDE mmol/L 101 98   CO2 mmol/L 23 26   ANION GAP mmol/L 8 8   BUN mg/dL 11 10   CREATININE mg/dL 0.90 1.05   EGFR ml/min/1.73sq m 83 69   CALCIUM mg/dL 9.7 10.3*     Results from last 7 days   Lab Units 12/12/23  1720   AST U/L 10*   ALT U/L 8   ALK PHOS U/L 68   TOTAL PROTEIN g/dL 6.8   ALBUMIN g/dL 3.7   TOTAL BILIRUBIN mg/dL 0.81     Results from last 7 days   Lab Units 12/13/23  0743   POC GLUCOSE mg/dl 79     Results from last 7 days   Lab Units 12/13/23  0556 12/12/23  1720   GLUCOSE RANDOM mg/dL 94 131     Results from last 7 days   Lab Units 12/12/23 2123 12/12/23  1913 12/12/23  1720   HS TNI 0HR ng/L  --   --  7   HS TNI 2HR ng/L  --  6  --    HSTNI D2 ng/L  --  -1  --    HS TNI 4HR ng/L 6  --   --    HSTNI D4 ng/L -1  --   --          Results from last 7 days   Lab Units 12/12/23  1720   PROTIME seconds 13.5   INR  1.01   PTT seconds 29         Results from last 7 days   Lab Units 12/13/23  0556 12/12/23 2123   PROCALCITONIN ng/ml 0.14 0.15     Results from last 7 days   Lab Units 12/12/23 1956   LACTIC ACID mmol/L 2.0             Results from last 7 days   Lab Units 12/12/23  1720   BNP pg/mL 57     Results from last 7 days   Lab Units 12/12/23  1741   CLARITY UA  Turbid   COLOR UA  Colorless   SPEC GRAV UA  1.009   PH UA  5.0   GLUCOSE UA mg/dl >=1000 (1%)*   KETONES UA mg/dl Negative   BLOOD UA  Small*   PROTEIN UA mg/dl Negative   NITRITE UA  Negative   BILIRUBIN UA  Negative   UROBILINOGEN UA (BE) mg/dl <2.0   LEUKOCYTES UA  Large*   WBC UA /hpf Innumerable*   RBC UA /hpf 10-20*   BACTERIA UA /hpf Occasional   EPITHELIAL CELLS WET PREP /hpf None Seen     Results from last 7 days   Lab Units 12/12/23 2035   INFLUENZA A PCR  Negative   INFLUENZA B PCR  Negative   RSV PCR  Negative     Results from last 7 days   Lab Units 12/12/23 1956 12/12/23 1934   BLOOD CULTURE  Received in Microbiology Lab. Culture in Progress. Received in Microbiology Lab. Culture in Progress.      ED Treatment:   Medication Administration from 12/12/2023 1658 to 12/12/2023 2213         Date/Time Order Dose Route Action     12/12/2023 1951 EST sodium chloride 0.9 % bolus 500 mL 500 mL Intravenous New Bag     12/12/2023 2006 EST levofloxacin (LEVAQUIN) IVPB (premix in dextrose) 750 mg 150 mL 750 mg Intravenous New Bag          Past Medical History:   Diagnosis Date    Acute laryngitis     Acute nonsuppurative otitis media, unspecified laterality     Arm weakness     Arthritis     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[facility])     Bladder infection     Bronchitis     Constipation     Cough     Diabetes mellitus ([facility])     Dizziness     Dysfunction of eustachian tube     Erectile dysfunction of non-organic origin     Fatigue     Glaucoma     Hiatal hernia     Hypertension     Imbalance     Leg muscle spasm     MS (multiple sclerosis) ([facility])     Nephrolithiasis     Neurogenic bladder     No natural teeth     Sinus pain     Spinal stenosis     Strain of thoracic region     Stroke [facility])     Suprapubic catheter ([address])      Present on Admission:   Chronic diastolic congestive heart failure ([address])   Type 2 diabetes mellitus with hyperglycemia, without long-term current use of insulin (HCC)   Multiple sclerosis (HCC)   Primary hypertension   Pneumonia      Admitting Diagnosis: Leucocytosis [D72.829]  UTI (urinary tract infection) [N39.0]  Head injury [S09.90XA]  Fall in elderly patient [R29.6]  Age/Sex: 75 y.o. male  Admission Orders:  Scheduled Medications:  amLODIPine, 10 mg, Oral, Daily  atorvastatin, 80 mg, Oral, Daily With Dinner  cefTRIAXone, 1,000 mg, Intravenous, Q24H  clopidogrel, 75 mg, Oral, Daily  gabapentin, 300 mg, Oral, BID  gabapentin, 600 mg, Oral, HS  heparin (porcine), 5,000 Units, Subcutaneous, Q8H [address]  insulin lispro, 1-5 Units, Subcutaneous, 4x Daily (AC & HS)  latanoprost, 1 drop, Both Eyes, HS  propranolol, 60 mg, Oral, Daily  sertraline, 25 mg, Oral, HS      Continuous IV Infusions:     PRN Meds:  acetaminophen, 975 mg, Oral, Q8H PRN - x 1 12/13  ALPRAZolam, 0.25 mg, Oral, BID PRN - x 1 12/13  Diclofenac Sodium, 2 g, Topical, Q4H PRN  ondansetron, 4 mg, Intravenous, Q6H PRN    POC GLUCOSE AC/HS WITH SSI COVERAGE   Urine culture  Wound care consult  ADA diet  Isolation for MDRO history      Network Utilization Review Department  ATTENTION: Please call with any questions or concerns to [phone] and carefully listen to the prompts so that you are directed to the right person. All voicemails are confidential.   For Discharge needs, contact Care Management DC Support Team at [phone] opt. 2  Send all requests for admission clinical reviews, approved or denied determinations and any other requests to dedicated fax number below belonging to the campus where the patient is receiving treatment.  List of dedicated fax numbers for the Facilities:  [place] DENIALS (Administrative/Medical Necessity) [phone]   DISCHARGE SUPPORT TEAM (NETWORK) [phone]   [address] (Maternity/NICU/Pediatrics) [phone]   [address] [facility] [address] [address] [facility] [address]  [phone]

## 2023-12-13 NOTE — ASSESSMENT & PLAN NOTE
Multifactorial due to advanced age, history of stroke, deconditioning  CT showed no traumatic injury.   Discussed possible rehab placement which he is against.  Fall precautions

## 2023-12-13 NOTE — PHYSICAL THERAPY NOTE
PT EVALUATION    Pt. Name: Shon Randle  Pt. Age: 75 y.o.   MRN: [id number]  LENGTH OF STAY: 1    Patient Active Problem List   Diagnosis    Diabetic neuropathy ([address])    Cervical spinal stenosis    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HCC)    Benign colon polyp    Chronic suprapubic catheter (HCC)    Esophageal reflux    Fatty liver    Generalized anxiety disorder    Glaucoma    Hyperlipidemia    Primary hypertension    Multiple sclerosis ([address])    Obstructive sleep apnea    Thyroid nodule    Type 2 diabetes mellitus with hyperglycemia, without long-term current use of insulin (HCC)    Urinary retention    History of CVA (cerebrovascular accident)    Bladder stones    Bladder neck contracture    Pancreatic mass    Pancreatic lesion    Excessive daytime sleepiness    Shortness of breath    Hyponatremia    Chronic diastolic congestive heart failure (HCC)    Constipation    Accidental fall from chair    Leucocytosis    Fall    Weakness       Admitting Diagnoses:   Leucocytosis [D72.829]  UTI (urinary tract infection) [N39.0]  Head injury [S09.90XA]  Fall in elderly patient [R29.6]    Past Medical History:   Diagnosis Date    Acute laryngitis     Acute nonsuppurative otitis media, unspecified laterality     Arm weakness     Arthritis     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HCC)     Bladder infection     Bronchitis     Constipation     Cough     Diabetes mellitus (HCC)     Dizziness     Dysfunction of eustachian tube     Erectile dysfunction of non-organic origin     Fatigue     Glaucoma     Hiatal hernia     Hypertension     Imbalance     Leg muscle spasm     MS (multiple sclerosis) ([address])     Nephrolithiasis     Neurogenic bladder     No natural teeth     Sinus pain     Spinal stenosis     Strain of thoracic region     Stroke [facility])     Suprapubic catheter ([address])        Past Surgical History:   Procedure Laterality Date    APPENDECTOMY      BRAIN SURGERY      Coil placed in aneurysm    CYSTOSCOPY      CYSTOSCOPY      CYSTOSCOPY 06/11/2018    CYSTOSCOPY  01/15/2021    EYE SURGERY      transscleral cyclophotocoagulation noncontact YAG laser    MYRINGOTOMY      with ventilation tube insertion    AR LITHOLAPAXY SMPL/SM <2.5 CM N/A 5/7/2019    Procedure: CYSTOSCOPY, holmium laser litholapaxy of bladder stones, EXCHANGE OF SP TUBE;  Surgeon: Leatha Pantoja MD;  Location: [facility];  Service: Urology    SUPRAPUBIC CATHETER INSERTION         Imaging Studies:  CT head without contrast   Final Result by Rachael Stroud MD (12/12 1812)      No acute intracranial abnormality. Workstation performed: [id number]         CT cervical spine without contrast   Final Result by Rachael Stroud MD (12/12 1814)      No cervical spine fracture or traumatic malalignment. Moderate cervical spondylosis. Workstation performed: [id number]         CT chest abdomen pelvis wo contrast   Final Result by Rachael Stroud MD (12/12 1826)      No acute traumatic CT findings. Small cluster of tree-in-bud type opacities in the left lower lobe likely infectious/inflammatory in etiology. Probable mild constipation. Mild fecal impaction. Suprapubic catheter. Workstation performed: [id number]         CT recon only thoracolumbar (No Charge)   Final Result by Rachael Stroud MD (12/12 1827)      No fracture or traumatic subluxation. Workstation performed: [id number]             12/13/23 0912   PT Last Visit   PT Visit Date 12/13/23   Note Type   Note type Evaluation   Pain Assessment   Pain Assessment Tool 0-10   Pain Score 10 - Worst Possible Pain   Pain Location/Orientation Location: Groin; Location: Pelvis   Pain Onset/Description Frequency: Constant/Continuous   Patient's Stated Pain Goal No pain   Hospital Pain Intervention(s) Repositioned; Ambulation/increased activity; Elevated; Emotional support; Rest   Multiple Pain Sites No   Restrictions/Precautions   Weight Bearing Precautions Per Order No   Other Precautions Contact/isolation; Bed Alarm;Multiple lines; Fall Risk;Pain   Home Living   Type of [address] One level;Ramped entrance;Performs ADLs on one level; Able to live on main level with bedroom/bathroom   Bathroom Shower/Tub Walk-in shower   Bathroom Toilet Standard  (previous note stated that the toilet was raised)   [employer] bars in shower; Shower chair;Grab bars around toilet   [address] Wheelchair-manual;Walker;Cane;Hospital bed;Grab bars  (sit<>stand machine)   Additional Comments Pt is poor historian. THe home set up info was taken from pt and pt's chart (previous notes) He states that he lives with his brother and sister, but they are no longer helping him with ADLs/IADLs. He reports getting HHA everyday, but only for a few hours. Prior Function   Level of [place] Needs assistance with ADLs; Needs assistance with functional mobility; Needs assistance with IADLS   Lives With Family  (sister and brother)   Receives Help From Family  (Pt reports HHA 7x a week for a few hours and goes to [employer] life 1x a week.)   IADLs Family/Friend/Other provides transportation; Family/Friend/Other provides meals; Family/Friend/Other provides medication management   Falls in the last 6 months 1 to 4   Vocational Retired   Comments PTA, pt reports requiring assist w/ ADLs, IADLs, and functional transfers at baseline. Pt reports using sit<>stand machine for transfers to/from W/C with Ax2. (-) . (+) falls   General   Family/Caregiver Present No   Cognition   Overall Cognitive Status Impaired   Arousal/Participation Cooperative   Attention Attends with cues to redirect   Orientation Level Oriented to person;Oriented to place; Disoriented to time   Following Commands Follows one step commands with increased time or repetition   Comments Difficult to understand home set up and level of assistance due to pt being a poor historian.    Subjective   Subjective Pt agreeable to PT evaluation   RUE Assessment   RUE Assessment   (see OT notes)   LUE Assessment   LUE Assessment   (see OT notes)   RLE Assessment   RLE Assessment X  (Not able to move B LE well, due to weakness and pain.)   LLE Assessment   LLE Assessment X  (Not able to move B LE well due to weakness and pain.)   Bed Mobility   Supine to Sit 1  Dependent   Additional items Assist x 2;LE management;Verbal cues; Increased time required   Sit to Supine 1  Dependent   Additional items Assist x 2; Increased time required;Verbal cues;LE management   Additional Comments   (Pt left in bed supine post session. Call bell within reach. In stable condition when left.)   Transfers   Sit to Stand 2  Maximal assistance   Additional items Assist x 2;Verbal cues   Stand to Sit 2  Maximal assistance   Additional items Increased time required;Assist x 2   Additional Comments   (+ retropulsion, cueing needed for hand placement and upright posture when standing.)   Ambulation/Elevation   Gait pattern Not appropriate   Ambulation/Elevation Additional Comments use of quick move for OOB to chair   Balance   Static Sitting Fair -   Dynamic Sitting Poor +   Static Standing Poor   Activity Tolerance   Activity Tolerance Patient limited by pain; Patient limited by fatigue   Medical Staff [facility] OT   Nurse Made Aware yes, Anai Swain RN   Assessment   Prognosis Fair   Problem List Decreased strength;Decreased range of motion;Decreased endurance; Impaired balance;Decreased mobility; Decreased cognition;Decreased safety awareness;Pain   Assessment Pt. 73 y.o.male presents s/p fall. Past medical hx includes MS, hx of cva nonambulatory status. Pt admitted for Fall w/ Leucocytosis, UTI, Head injury, Fall in elderly patient. Pt did have CT of head done, no acute intracranial abnormality; CT of cervical spine showed no fracture or traumatic malalignment.  Pt referred to PT for functional mobility evaluation & D/C planning w/ orders of up & OOB as tolerated, but supervision and/or assistance PRN. PTA, pt reports needs assistance with ADL's, IADL's, limited mobility with bed and transfers. Pt is currently using a sit to stand machine to transfers out of bed. Pt reports using a manual WC to ambulate around his home. Per patient, he reports having HHA 7x a week for a few hours. Pt was able to sit at EOB, dependentx2 for bed mobility and MaxAx2 for sit <>stand with RW. Pt needed constant cueing for hand placement on RW and forward posture during standing. Not appropriate to ambulate due to pain, weakness and PLOF. Use of quick move for  OOB bed mobility bed<>chair. Please see flow sheet above for objective findings and level of assistance required for safe completion of functional tasks. Pt demonstrated dec endurance and tolerance to activity. Denies reports of dizziness or SOB t/o session. At end of session, pt back in bed in stable condition, call bell & phone in reach, bed alarm activated. Pt was educated on fall precautions and reinforced w/ good understanding. Pt would benefit from continued PT to address deficits as defined above and maximize level of independence with functional mobility and safety. Based on pt presentation and impaired function, pt would benefit from level II, (moderate resource intensity) at D/C. The patient's AM-PAC Basic Mobility Inpatient Short Form Raw Score is 8. A Raw score of less than or equal to 16 suggests the patient may benefit from discharge to post-acute rehabilitation services. Due to pt being more historian, unclear if he is at his baseline. Pt may benefit from short term rehab to continue to assess pt's current level and return him to prior baseline. Please also refer to the recommendation of the Physical Therapist for safe discharge planning. CM to follow. [facility] [place] to continue to mobilized pt (OOB in chair for all meals with quick move) as tolerated to prevent further decline in function. [facility] [place].  Co-trev performed to complete this PT evaluation for the pts best interest given pts medical complexity and functional level. Barriers to Discharge Decreased caregiver support   Goals   Patient Goals To be able to go home   STG Expiration Date 12/27/23   Short Term Goal #1 1) Inc overall LE strength by 1/2 MMT grade to improve functional mobility; 2) Pt will demonstrate improved bed mobility with Mod Ax2 to dec caregiver burden; 3) Pt will demonstrate improved transfers w/ Mod A for inc safety; 4) PT for ongoing patient and caregiver education. PT Treatment Day 0   Plan   Treatment/Interventions ADL retraining;OT;Bed mobility;LE strengthening/ROM; Therapeutic exercise; Endurance training;Patient/family training;Functional transfer training;Spoke to nursing   PT Frequency 3-5x/wk   Discharge Recommendation   Rehab Resource Intensity Level, PT II (Moderate Resource Intensity)   AM-PAC Basic Mobility Inpatient   Turning in Flat Bed Without Bedrails 2   Lying on Back to Sitting on Edge of Flat Bed Without Bedrails 2   Moving Bed to Chair 1   Standing Up From Chair Using Arms 1   Walk in Room 1   Climb 3-5 Stairs With Railing 1   Basic Mobility Inpatient Raw Score 8   Highest Level Of Mobility   -HL Goal 3: Sit at edge of bed   -HLM Achieved 3: Sit at edge of bed   End of Consult   Patient Position at End of Consult Supine;Bed/Chair alarm activated; All needs within reach   Hx/personal factors: co-morbidities, dec caregiver support, advanced age, telemetry, use of AD, dec cognition, pain, h/o of falls, fall risk, and assist w/ ADL's  Examination: dec mobility, dec balance, dec endurance, dec amb, risk for falls, pain, dec cognition, assessed body system, balance, endurance, amb, D/C disposition & fall risk, impairements in locomotion, musculoskeletal, balance, endurance, posture, coordination  Clinical: unpredictable (ongoing medical status, abnormal lab values, risk for falls, and pain mgt)  Complexity: high    [address] [facility] Alex Resides, PT

## 2023-12-13 NOTE — SPEECH THERAPY NOTE
Speech Language/Pathology  Speech/Language Pathology  Assessment    Patient Name: Patricia Sandoval  RISTEPHENETTERESA Date: 12/13/2023     Problem List  Principal Problem:    Fall  Active Problems:    Primary hypertension    Type 2 diabetes mellitus with hyperglycemia, without long-term current use of insulin (HCC)    Urinary retention    History of CVA (cerebrovascular accident)    Chronic diastolic congestive heart failure (HCC)    Leucocytosis    Weakness    Past Medical History  Past Medical History:   Diagnosis Date    Acute laryngitis     Acute nonsuppurative otitis media, unspecified laterality     Arm weakness     Arthritis     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HCC)     Bladder infection     Bronchitis     Constipation     Cough     Diabetes mellitus (HCC)     Dizziness     Dysfunction of eustachian tube     Erectile dysfunction of non-organic origin     Fatigue     Glaucoma     Hiatal hernia     Hypertension     Imbalance     Leg muscle spasm     MS (multiple sclerosis) ([facility])     Nephrolithiasis     Neurogenic bladder     No natural teeth     Sinus pain     Spinal stenosis     Strain of thoracic region     Stroke [facility])     Suprapubic catheter ([address])      Past Surgical History  Past Surgical History:   Procedure Laterality Date    APPENDECTOMY      BRAIN SURGERY      Coil placed in aneurysm    CYSTOSCOPY      CYSTOSCOPY      CYSTOSCOPY  06/11/2018    CYSTOSCOPY  01/15/2021    EYE SURGERY      transscleral cyclophotocoagulation noncontact YAG laser    MYRINGOTOMY      with ventilation tube insertion    RI LITHOLAPAXY SMPL/SM <2.5 CM N/A 5/7/2019    Procedure: CYSTOSCOPY, holmium laser litholapaxy of bladder stones, EXCHANGE OF SP TUBE;  Surgeon: Shania Patel MD;  Location: [facility];  Service: Urology    SUPRAPUBIC CATHETER INSERTION          Bedside Swallow Evaluation:    Summary:  Pt presented w/ denial of any swallowing issues other than sometimes food but primarily large pills getting stuck in his "chest".   Pointing to sternum. Noted he does have a small hiatal hernia. Question if he may or may not have some reduced peristalsis from his MS. Intake was good at lunch. Patient had turkey with gravy and mashed potatoes, some ice cream and coffee. Denies any difficulty chewing or swallowing. He is edentulous but states he can eat better without his dentures in. Bolus control, formation, and transfer appeared WNL. No cough or wet vocal quality. No complaints of anything stuck in esophagus/chest with today's meal.  I did encourage him to try to take small sips in between bites. No oral pharyngeal signs and symptoms this eval.  Patient was fully alert and pleasant. Recommendations:  Diet:Regular  Liquid: Thin  Meds: Reports large pills get stuck in his "chest". Suggested breaking or softening them but he stated they do not work that way, and he cannot take them in applesauce. Supervision: As needed  Positioning:Upright  Strategies: Chew well, slow rate, take sips after every few bites  Pt to take PO/Meds only when fully alert and upright. Reflux precautions  Eval only, No f/u tx indicated. Consider consult w/:  Rehab  GI ???  Nutrition    H&P/Admit info/ pertinent provider notes: (PMH noted above)  Chief Complaint: fall asthenia  History of Present Illness:  Anthony Abdi is a 75 y.o. male with a PMH of MS, hx of cva nonambulatory status, htn niddmwho presents with fall at home. Pt is a somewhat poor historian. Hpi constructed by d/w pt and review of emr. Attempted to call brother/sister with whom pt lives. VM left. Pt notes he did recently have some bad n/v which has since resolved but otherwise was in his normal state of health when on the day of admission he attempted to get out of bed and fell to the ground.   This mechanism is rather poorly explained but pt was down for a short amount of time before his sister/brother called EMS for fire to help get him up and to the hospital.  His ROS is roughly equivocal and positive for most things. He reports fatigue/weakness sometimes and myalgias sometimes but is hard to tell if its worse than normal presently. Denies any cp/sob thankfully or headache neck pain but reports chronic hip pain in right questionably worsened and does not left hip pain which is new worsened since fall. No groin pain flank pain with chronic suprapubic catheter. Has a cough which is nonproductive and denies sore throat fevers/chills. Endorses occasionally coughing while eating but not worsening recently that he can recall. In ed he was found to have significant leucocytosis w/UA concerning for UTI and ct chest concerning for possible LLL pneumonia/inflammation. Admission was requested. Special Studies:  CT chest abdomen/pelvis 12/12/23  STOMACH AND BOWEL: Tiny hiatal hernia. No bowel obstruction. Mild to moderate amount of stool seen scattered throughout the colon. Mild constipation not excluded. There is mild fecal distention of the rectum measuring up to 6.3 cm suggestive of mild   fecal impaction. CT head 12/12  Neg acute    Procalcitonin:  0.14  12/13   WBC:  15.37  12/13/23     Previous MBS:  [facility] 3/14/19  Oral Stage: mild increased mastication of soft/dry solids likely 2/2 edentulous state. Pt was able to functionally break down solid consistencies. Pharyngeal Stage: Timely pharyngeal swallow with adequate hyolaryngeal elevation and epiglottic inversion. No laryngeal penetration or tracheal aspiration observed with consistencies assessed. Pt able to successfully tolerate barium pill with thin liquids without difficulty. PLAN:  [x] Regular textured diet w/ thin liquids  *Pt able to choose softer moister PO 2/2 edentulous state   [] May want to continue/consider alternative means of nutrition/hydration   [x] Frequent/Thorough Oral Care (3-5X/day, minimum)  [] Repeat Video Swallow Study:   [x]Meds: Oral (as tolerated)   [facility] 2/28/19 BS:  Impression  Impression:  1.  No hypopharyngeal mass or laryngeal vestibule penetration. 2. No annular obstructing esophageal lesion, ulceration or gastroesophageal  reflux. Patient's goal: none stated    Did the pt report pain? no  If yes, was nursing notified/was it addressed? N/a    Reason for consult:  R/o aspiration  Determine safest and least restrictive diet  h/o dysphagia, ? Esophageal  Reported large pill dysphagia    Precautions:  Contact    Food Allergies:  none   Current Diet:  regular   Premorbid diet:  regular   O2 requirement:  none   Social/Prior living  Lives w/ sister   Voice/Speech:  wnl   Follows commands:  yes   Cognitive status:  Alert and pleasant     Oral [facility] exam:  Dentition:edentulous. States he can't eat w/ his dentures in, but gets a new pair every year.   Lips (VII):+  Tongue (XII):+  Secretion management:+  Volitional cough:+  Volitional swallow:+    Results d/w:  Pt,  physician

## 2023-12-13 NOTE — ASSESSMENT & PLAN NOTE
Suspect uti 2* suprapubic catheter and hx of bladder neck contracture with recurrent utis given hematuria (10-20 rbcs/hpf on plavix, innumerable wbcs/hpf) although only occasional bacteria/hpf.   Has grown Klebsiella strains E coli and E faecalis largely susceptible to rocephin (ancef resistance)  -empiric rocephin for now as noted below for leucocytosis  -f/u ucx

## 2023-12-13 NOTE — ASSESSMENT & PLAN NOTE
Significant leucocytosis in the setting of chronic suprapubic catheter w/abnormal ua and tree in bud nodular opacities in LLL on the basis of infectious/inflammatory etiology  -ua w/10-20 rbcs innumerable wbcs and occasional bacteria ucx pending on plavix  -rec'd levaquin in ed to keflex allergy (rash 01/2019 but tolerated rocephin and ampicillin in 08/23) for pyelonephritis  -check procalcitonin for LRTI given tree in bud opacities, flu rsv covid  -transition to iv rocephin for now, f/u ucx, bcx procalcitonin

## 2023-12-13 NOTE — ASSESSMENT & PLAN NOTE
Fall out of bed at home with generalized asthenia  -CT head C-spine recon TL spine negative for fracture or intracranial hemorrhage  -Likely contributed to by underlying stroke/MS as well as infection either urinary versus possible pneumonia on CT  -Consult PT OT, check orthostatics, gentle ivf

## 2023-12-13 NOTE — PLAN OF CARE
Problem: PHYSICAL THERAPY ADULT  Goal: Performs mobility at highest level of function for planned discharge setting. See evaluation for individualized goals. Description: Treatment/Interventions: ADL retraining, OT, Bed mobility, LE strengthening/ROM, Therapeutic exercise, Endurance training, Patient/family training, Functional transfer training, Spoke to nursing          See flowsheet documentation for full assessment, interventions and recommendations. Note: Prognosis: Fair  Problem List: Decreased strength, Decreased range of motion, Decreased endurance, Impaired balance, Decreased mobility, Decreased cognition, Decreased safety awareness, Pain  Assessment: Pt. 73 y.o.male presents s/p fall. Past medical hx includes MS, hx of cva nonambulatory status. Pt admitted for Fall w/ Leucocytosis, UTI, Head injury, Fall in elderly patient. Pt did have CT of head done, no acute intracranial abnormality; CT of cervical spine showed no fracture or traumatic malalignment. Pt referred to PT for functional mobility evaluation & D/C planning w/ orders of up & OOB as tolerated, but supervision and/or assistance PRN. PTA, pt reports needs assistance with ADL's, IADL's, limited mobility with bed and transfers. Pt is currently using a sit to stand machine to transfers out of bed. Pt reports using a manual WC to ambulate around his home. Per patient, he reports having HHA 7x a week for a few hours. Pt was able to sit at EOB, dependentx2 for bed mobility and MaxAx2 for sit <>stand with RW. Pt needed constant cueing for hand placement on RW and forward posture during standing. Not appropriate to ambulate due to pain, weakness and PLOF. Use of quick move for  OOB bed mobility bed<>chair. Please see flow sheet above for objective findings and level of assistance required for safe completion of functional tasks. Pt demonstrated dec endurance and tolerance to activity. Denies reports of dizziness or SOB t/o session.  At end of session, pt back in bed in stable condition, call bell & phone in reach, bed alarm activated. Pt was educated on fall precautions and reinforced w/ good understanding. Pt would benefit from continued PT to address deficits as defined above and maximize level of independence with functional mobility and safety. Based on pt presentation and impaired function, pt would benefit from level II, (moderate resource intensity) at D/C. The patient's AM-PAC Basic Mobility Inpatient Short Form Raw Score is 8. A Raw score of less than or equal to 16 suggests the patient may benefit from discharge to post-acute rehabilitation services. Due to pt being more historian, unclear if he is at his baseline. Pt may benefit from short term rehab to continue to assess pt's current level and return him to prior baseline. Please also refer to the recommendation of the Physical Therapist for safe discharge planning. CM to follow. Nsg staff to continue to mobilized pt (OOB in chair for all meals with quick move) as tolerated to prevent further decline in function. Nsg notified. Co-eval performed to complete this PT evaluation for the pts best interest given pts medical complexity and functional level. Barriers to Discharge: Decreased caregiver support     Rehab Resource Intensity Level, PT: II (Moderate Resource Intensity)    See flowsheet documentation for full assessment.

## 2023-12-13 NOTE — ASSESSMENT & PLAN NOTE
Lab Results   Component Value Date    HGBA1C 8.1 (H) 12/04/2023   Poorly controlled.   Hold trulicity  Start ssi/poc bs

## 2023-12-13 NOTE — PLAN OF CARE
Problem: OCCUPATIONAL THERAPY ADULT  Goal: Performs self-care activities at highest level of function for planned discharge setting. See evaluation for individualized goals. Description: Treatment Interventions: ADL retraining, Functional transfer training, UE strengthening/ROM, Endurance training, Patient/family training, Equipment evaluation/education, Compensatory technique education, Continued evaluation, Activityengagement          See flowsheet documentation for full assessment, interventions and recommendations. Note: Limitation: Decreased ADL status, Decreased UE strength, Decreased endurance, Decreased fine motor control, Decreased self-care trans, Decreased high-level ADLs  Prognosis: Fair  Assessment: Pt is a 75 y.o. male seen for OT evaluation s/p adm to [address] on 12/12/2023 w/ Fall, PNA, Leucocytosis. Comorbidities affecting pt’s functional performance include a significant PMH of MS, CVA, HTN, Arthritis, DM, Glaucoma, Neurogenic bladder, Spinal stenosis. Pt with active OT orders and activity orders for Up and OOB as tolerated. Pt is poor historian, info on home setup and PLOF obtained via pt and pt's chart. Pt lives with sister and brother in a one level house with ramped entrance. Pt reports having HHA 7 days/wk for "a few hours." At baseline, pt reports requiring assist w/ ADLs, IADLs, and functional transfers. Pt reports using sit<>stand machine for transfers to/from W/C with Ax2. (-) . (+) fall PTA. Upon evaluation, pt currently requires Min A for UB ADLs, Max A for LB ADLs, Max A for toileting, Dependent assist of 2 for bed mobility, and Max A of 2 for functional transfers 2* the following deficits impacting occupational performance: decreased strength , decreased balance, decreased activity tolerance, limited functional reach, impaired [place], impaired memory, decreased safety awareness, visual deficits, and decreased postural control.  These impairments, as well at pt’s personal factors of: limited home support, difficulty performing ADLs, difficulty performing transfers/mobility, limited insight into deficits, fall risk , and functional decline  limit pt’s ability to safely engage in all baseline areas of occupation. Pt to continue to benefit from continued acute OT services during hospital stay to address defined deficits and to maximize level of functional independence in the following Occupational Performance areas: eating, grooming, bathing/shower, toilet hygiene, dressing, health maintenance, functional mobility, community mobility, clothing management, and social participation. The patient's raw score on the -PAC Daily Activity Inpatient Short Form is 15. A raw score of less than 19 suggests the patient may benefit from discharge to post-acute rehabilitation services. Please refer to the recommendation of the Occupational Therapist for safe discharge planning. OT will continue to follow pt 2-3x/wk to address the following goals to  w/in 10-14 days:     Rehab Resource Intensity Level, OT: II (Moderate Resource Intensity) (STR vs HHOT pending pt's baseline level of functioning and available home support.  Pt is poor historian, ?accuracy of info provided during evaluation)

## 2023-12-13 NOTE — ASSESSMENT & PLAN NOTE
With chronic suprapubic catheter. Normal urinalysis with leukocytosis and possible urinary tract infection. Continue empiric ceftriaxone. Follow-up culture results  Check CBC in a.m.

## 2023-12-13 NOTE — ED PROVIDER NOTES
History  Chief Complaint   Patient presents with    Fall     BIBA - slipped out of bed hitting head - denies LOC. Reports sore throat and mild weakness. Unknown thinners. Denies CP. +back pain       History provided by:  Patient   used: No    Fall  Mechanism of injury: fall    Injury location:  Head/neck and torso  Head/neck injury location:  Head  Torso injury location:  Back  Incident location:  Home  Time since incident:  2 hours  Arrived directly from scene: yes    Fall:     Fall occurred:  From a bed    Height of fall:  Unable to specify    Impact surface:  Unable to specify    Point of impact:  Head    Entrapped after fall: no    Protective equipment: none    Suspicion of alcohol use: no    Suspicion of drug use: no    Tetanus status:  Unknown  Prior to arrival data:     Bystander interventions:  None    Patient ambulatory at scene: no      Blood loss:  None    Responsiveness at scene:  Alert    Orientation at scene:  Person, place, situation and time    Loss of consciousness: no      Amnesic to event: no      Airway interventions:  None    Breathing interventions:  None    IV access status:  None    IO access:  None    Fluids administered:  None    Cardiac interventions:  None    Medications administered:  None    Immobilization:  None    Airway condition since incident:  Stable    Breathing condition since incident:  Stable    Circulation condition since incident:  Stable    Mental status condition since incident:  Stable    Disability condition since incident:  Stable  Associated symptoms: back pain    Associated symptoms: no abdominal pain, no blindness, no chest pain, no difficulty breathing, no headaches, no nausea, no neck pain, no seizures and no vomiting    Risk factors comment:  DM, HTN, CVA, MS      Prior to Admission Medications   Prescriptions Last Dose Informant Patient Reported? Taking?    ALPRAZolam (XANAX) 0.25 mg tablet  Self Yes No   Sig: Take 0.25 mg by mouth 2 (two) times a day as needed for anxiety or sleep    Dulaglutide (Trulicity) 4.18 RY/3.1GF SOPN  Self Yes No   Sig: Inject 0.75 mg under the skin once a week   Ergocalciferol (VITAMIN D2 PO)  Self Yes No   Sig: Take 50,000 Units by mouth once a week   albuterol (2.5 mg/3 mL) 0.083 % nebulizer solution   No No   Sig: Take 3 mL (2.5 mg total) by nebulization every 6 (six) hours as needed for wheezing or shortness of breath   albuterol (Ventolin HFA) 90 mcg/act inhaler   No No   Sig: Inhale 2 puffs every 6 (six) hours as needed for wheezing   amLODIPine-atorvastatin (CADUET) 10-80 MG per tablet  Self Yes No   Sig: Take 1 tablet by mouth daily   clopidogrel (PLAVIX) 75 mg tablet  Self No No   Sig: Take 1 tablet (75 mg total) by mouth daily   furosemide (LASIX) 40 mg tablet  Self Yes No   Sig: Take 40 mg by mouth daily   gabapentin (NEURONTIN) 300 mg capsule  Self No No   Sig: Take 1 capsule (300 mg total) by mouth 2 (two) times a day   gabapentin (NEURONTIN) 300 mg capsule   No No   Sig: Take 2 capsules (600 mg total) by mouth daily at bedtime   latanoprost (XALATAN) 0.005 % ophthalmic solution  Self Yes No   Sig: Administer 1 drop to both eyes daily at bedtime   melatonin 3 mg  Self Yes No   Sig: Take 3 mg by mouth daily at bedtime as needed   pantoprazole (PROTONIX) 40 mg tablet   No No   Sig: TAKE 1 TABLET TWICE DAILY 30 MINUTES BEFORE BREAKFAST AND DINNER.   polyethylene glycol (MIRALAX) 17 g packet   Yes No   Sig: Take 17 g by mouth daily as needed   potassium chloride (Klor-Con) 10 mEq tablet   Yes No   Sig: Take 10 mEq by mouth 2 (two) times a day   propranolol (INDERAL LA) 60 mg 24 hr capsule   Yes No   Sig: Take 60 mg by mouth daily   senna-docusate sodium (SENOKOT S) 8.6-50 mg per tablet   Yes No   Sig: Take 2 tablets by mouth daily at bedtime   sertraline (ZOLOFT) 25 mg tablet   Yes No   Sig: Take 25 mg by mouth daily at bedtime      Facility-Administered Medications: None       Past Medical History:   Diagnosis Date    Acute laryngitis     Acute nonsuppurative otitis media, unspecified laterality     Arm weakness     Arthritis     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HCC)     Bladder infection     Bronchitis     Constipation     Cough     Diabetes mellitus (HCC)     Dizziness     Dysfunction of eustachian tube     Erectile dysfunction of non-organic origin     Fatigue     Glaucoma     Hiatal hernia     Hypertension     Imbalance     Leg muscle spasm     MS (multiple sclerosis) (HCC)     Nephrolithiasis     Neurogenic bladder     No natural teeth     Sinus pain     Spinal stenosis     Strain of thoracic region     Stroke [facility])     Suprapubic catheter ([address])        Past Surgical History:   Procedure Laterality Date    APPENDECTOMY      BRAIN SURGERY      Coil placed in aneurysm    CYSTOSCOPY      CYSTOSCOPY      CYSTOSCOPY  2018    CYSTOSCOPY  01/15/2021    EYE SURGERY      transscleral cyclophotocoagulation noncontact YAG laser    MYRINGOTOMY      with ventilation tube insertion    GA LITHOLAPAXY SMPL/SM <2.5 CM N/A 2019    Procedure: CYSTOSCOPY, holmium laser litholapaxy of bladder stones, EXCHANGE OF SP TUBE;  Surgeon: Ale Anderson MD;  Location: [facility];  Service: Urology    SUPRAPUBIC CATHETER INSERTION         Family History   Problem Relation Age of Onset    Heart attack Mother     Stroke Mother     Heart attack Father     Anuerysm Father         In Stomach      I have reviewed and agree with the history as documented.     E-Cigarette/Vaping    E-Cigarette Use Never User      E-Cigarette/Vaping Substances    Nicotine No     THC No     CBD No     Flavoring No     Other No     Unknown No      Social History     Tobacco Use    Smoking status: Former     Current packs/day: 0.00     Average packs/day: 0.5 packs/day for 31.0 years (15.5 ttl pk-yrs)     Types: Cigarettes     Start date:      Quit date:      Years since quittin.9    Smokeless tobacco: Never   Vaping Use    Vaping status: Never Used   Substance Use Topics    Alcohol use: Not Currently    Drug use: No       Review of Systems   Constitutional:  Negative for chills and fever. HENT:  Negative for facial swelling, sore throat and trouble swallowing. Eyes:  Negative for blindness, pain and visual disturbance. Respiratory:  Negative for cough and shortness of breath. Cardiovascular:  Negative for chest pain and leg swelling. Gastrointestinal:  Negative for abdominal pain, blood in stool, diarrhea, nausea and vomiting. Genitourinary:  Negative for dysuria and flank pain. Musculoskeletal:  Positive for back pain. Negative for neck pain and neck stiffness. Skin:  Negative for pallor and rash. Allergic/Immunologic: Negative for environmental allergies and immunocompromised state. Neurological:  Negative for dizziness, seizures and headaches. Hematological:  Negative for adenopathy. Does not bruise/bleed easily. Psychiatric/Behavioral:  Negative for agitation and behavioral problems. All other systems reviewed and are negative. Physical Exam  Physical Exam  Vitals and nursing note reviewed. Constitutional:       General: He is not in acute distress. Appearance: He is well-developed. HENT:      Head: Normocephalic and atraumatic. Eyes:      Extraocular Movements: Extraocular movements intact. Cardiovascular:      Rate and Rhythm: Normal rate and regular rhythm. Heart sounds: Normal heart sounds. Pulmonary:      Effort: Pulmonary effort is normal. No respiratory distress. Breath sounds: Normal breath sounds. Abdominal:      Palpations: Abdomen is soft. Tenderness: There is no abdominal tenderness. There is no guarding or rebound. Comments: Indwelling suprapubic catheter in place   Musculoskeletal:         General: Normal range of motion. Cervical back: Normal range of motion and neck supple. Skin:     General: Skin is warm and dry. Neurological:      General: No focal deficit present. Mental Status: He is alert and oriented to person, place, and time.    Psychiatric:         Mood and Affect: Mood normal.         Behavior: Behavior normal.         Vital Signs  ED Triage Vitals   Temperature Pulse Respirations Blood Pressure SpO2   12/12/23 1702 12/12/23 1702 12/12/23 1702 12/12/23 1702 12/12/23 1702   97.8 °F (36.6 °C) 96 16 104/56 96 %      Temp Source Heart Rate Source Patient Position - Orthostatic VS BP Location FiO2 (%)   12/12/23 1702 12/12/23 1702 12/12/23 1702 12/12/23 1702 --   Oral Monitor Lying Right arm       Pain Score       12/12/23 2230       No Pain           Vitals:    12/12/23 2236 12/13/23 0741 12/13/23 0824 12/13/23 1538   BP: 116/62 106/56 102/51 112/55   Pulse: 71 88 88 89   Patient Position - Orthostatic VS: Lying Sitting  Sitting         Visual Acuity  Visual Acuity      Flowsheet Row Most Recent Value   L Pupil Size (mm) 3   R Pupil Size (mm) 3            ED Medications  Medications   latanoprost (XALATAN) 0.005 % ophthalmic solution 1 drop (1 drop Both Eyes Given 12/13/23 0354)   amLODIPine (NORVASC) tablet 10 mg (10 mg Oral Not Given 12/13/23 0828)   clopidogrel (PLAVIX) tablet 75 mg (75 mg Oral Given 12/13/23 0828)   gabapentin (NEURONTIN) capsule 300 mg (300 mg Oral Given 12/13/23 1654)   propranolol (INDERAL LA) 24 hr capsule 60 mg (60 mg Oral Not Given 12/13/23 0824)   sertraline (ZOLOFT) tablet 25 mg (25 mg Oral Given 12/13/23 0304)   gabapentin (NEURONTIN) capsule 600 mg (600 mg Oral Given 12/13/23 0304)   ALPRAZolam (XANAX) tablet 0.25 mg (0.25 mg Oral Given 12/13/23 0304)   atorvastatin (LIPITOR) tablet 80 mg (80 mg Oral Given 12/13/23 1654)   ondansetron (ZOFRAN) injection 4 mg (has no administration in time range)   insulin lispro (HumaLOG) 100 units/mL subcutaneous injection 1-5 Units ( Subcutaneous Not Given 12/13/23 1652)   heparin (porcine) subcutaneous injection 5,000 Units (5,000 Units Subcutaneous Given 12/13/23 1654)   cefTRIAXone (ROCEPHIN) IVPB (premix in dextrose) 1,000 mg 50 mL (has no administration in time range)   acetaminophen (TYLENOL) tablet 975 mg (975 mg Oral Given 12/13/23 0353)   Diclofenac Sodium (VOLTAREN) 1 % topical gel 2 g (has no administration in time range)   moisture barrier miconazole 2% cream (aka AMPARO MOISTURE BARRIER ANTIFUNGAL CREAM) ( Topical Given 12/13/23 1657)   sodium chloride 0.9 % bolus 500 mL (0 mL Intravenous Stopped 12/12/23 2121)   levofloxacin (LEVAQUIN) IVPB (premix in dextrose) 750 mg 150 mL (0 mg Intravenous Stopped 12/12/23 2141)       Diagnostic Studies  Results Reviewed       Procedure Component Value Units Date/Time    Blood culture #1 [[id number]] Collected: 12/12/23 1934    Lab Status: Preliminary result Specimen: Blood from Arm, Right Updated: 12/13/23 0020     Blood Culture Received in Microbiology Lab. Culture in Progress. Blood culture #2 [[id number]] Collected: 12/12/23 1956    Lab Status: Preliminary result Specimen: Blood from Arm, Right Updated: 12/13/23 0020     Blood Culture Received in Microbiology Lab. Culture in Progress. Procalcitonin [[id number]]  (Normal) Collected: 12/12/23 2123    Lab Status: Final result Specimen: Blood from Arm, Left Updated: 12/12/23 2202     Procalcitonin 0.15 ng/ml     HS Troponin I 4hr [[id number]]  (Normal) Collected: 12/12/23 2123    Lab Status: Final result Specimen: Blood from Arm, Left Updated: 12/12/23 2159     hs TnI 4hr 6 ng/L      Delta 4hr hsTnI -1 ng/L     FLU/RSV/COVID - if FLU/RSV clinically relevant [[id number]]  (Normal) Collected: 12/12/23 2035    Lab Status: Final result Specimen: Nares from Nose Updated: 12/12/23 2124     SARS-CoV-2 Negative     INFLUENZA A PCR Negative     INFLUENZA B PCR Negative     RSV PCR Negative    Narrative:      FOR PEDIATRIC PATIENTS - copy/paste COVID Guidelines URL to browser: [email or web address]    SARS-CoV-2 assay is a Nucleic Acid Amplification assay intended for the  qualitative detection of nucleic acid from SARS-CoV-2 in nasopharyngeal  swabs. Results are for the presumptive identification of SARS-CoV-2 RNA. Positive results are indicative of infection with SARS-CoV-2, the virus  causing COVID-19, but do not rule out bacterial infection or co-infection  with other viruses. Laboratories within the [facility] and its  territories are required to report all positive results to the appropriate  public health authorities. Negative results do not preclude SARS-CoV-2  infection and should not be used as the sole basis for treatment or other  patient management decisions. Negative results must be combined with  clinical observations, patient history, and epidemiological information. This test has not been FDA cleared or approved. This test has been authorized by FDA under an Emergency Use Authorization  (EUA). This test is only authorized for the duration of time the  declaration that circumstances exist justifying the authorization of the  emergency use of an in vitro diagnostic tests for detection of SARS-CoV-2  virus and/or diagnosis of COVID-19 infection under section 564(b)(1) of  the Act, 21 U. S.C. 625DVY-8(U)(3), unless the authorization is terminated  or revoked sooner. The test has been validated but independent review by FDA  and CLIA is pending. Test performed using [employer] GeneXpert: This RT-PCR assay targets N2,  a region unique to SARS-CoV-2. A conserved region in the E-gene was chosen  for pan-Sarbecovirus detection which includes SARS-CoV-2. According to CMS-2020-01-R, this platform meets the definition of high-throughput technology. Lactic acid, plasma (w/reflex if result > 2.0) [[id number]]  (Normal) Collected: 12/12/23 1956    Lab Status: Final result Specimen: Blood from Arm, Right Updated: 12/12/23 2018     LACTIC ACID 2.0 mmol/L     Narrative:      Result may be elevated if tourniquet was used during collection.     HS Troponin I 2hr [[id number]]  (Normal) Collected: 12/12/23 1913    Lab Status: Final result Specimen: Blood from Arm, Left Updated: 12/12/23 1942     hs TnI 2hr 6 ng/L      Delta 2hr hsTnI -1 ng/L     Urine Microscopic [[id number]]  (Abnormal) Collected: 12/12/23 1741    Lab Status: Final result Specimen: Urine, Suprapubic catheter Updated: 12/12/23 1854     RBC, UA 10-20 /hpf      WBC, UA Innumerable /hpf      Epithelial Cells None Seen /hpf      Bacteria, UA Occasional /hpf      WBC Clumps Present    Urine culture [[id number]] Collected: 12/12/23 1741    Lab Status:  In process Specimen: Urine, Suprapubic catheter Updated: 12/12/23 1854    UA w Reflex to Microscopic w Reflex to Culture [[id number]]  (Abnormal) Collected: 12/12/23 1741    Lab Status: Final result Specimen: Urine, Suprapubic catheter Updated: 12/12/23 1851     Color, UA Colorless     Clarity, UA Turbid     Specific Gravity, UA 1.009     pH, UA 5.0     Leukocytes, UA Large     Nitrite, UA Negative     Protein, UA Negative mg/dl      Glucose, UA >=1000 (1%) mg/dl      Ketones, UA Negative mg/dl      Urobilinogen, UA <2.0 mg/dl      Bilirubin, UA Negative     Occult Blood, UA Small    HS Troponin 0hr (reflex protocol) [[id number]]  (Normal) Collected: 12/12/23 1720    Lab Status: Final result Specimen: Blood from Arm, Left Updated: 12/12/23 1811     hs TnI 0hr 7 ng/L     Comprehensive metabolic panel [[id number]]  (Abnormal) Collected: 12/12/23 1720    Lab Status: Final result Specimen: Blood from Arm, Left Updated: 12/12/23 1810     Sodium 132 mmol/L      Potassium 4.2 mmol/L      Chloride 98 mmol/L      CO2 26 mmol/L      ANION GAP 8 mmol/L      BUN 10 mg/dL      Creatinine 1.05 mg/dL      Glucose 131 mg/dL      Calcium 10.3 mg/dL      AST 10 U/L      ALT 8 U/L      Alkaline Phosphatase 68 U/L      Total Protein 6.8 g/dL      Albumin 3.7 g/dL      Total Bilirubin 0.81 mg/dL      eGFR 69 ml/min/1.73sq m     Narrative:      [facility] guidelines for Chronic Kidney Disease (CKD):     Stage 1 with normal or high GFR (GFR > 90 mL/min/1.73 square meters)    Stage 2 Mild CKD (GFR = 60-89 mL/min/1.73 square meters)    Stage 3A Moderate CKD (GFR = 45-59 mL/min/1.73 square meters)    Stage 3B Moderate CKD (GFR = 30-44 mL/min/1.73 square meters)    Stage 4 Severe CKD (GFR = 15-29 mL/min/1.73 square meters)    Stage 5 End Stage CKD (GFR <15 mL/min/1.73 square meters)  Note: GFR calculation is accurate only with a steady state creatinine    B-Type Natriuretic Peptide(BNP) [[id number]]  (Normal) Collected: 12/12/23 1720    Lab Status: Final result Specimen: Blood from Arm, Left Updated: 12/12/23 1809     BNP 57 pg/mL     Protime-INR [[id number]]  (Normal) Collected: 12/12/23 1720    Lab Status: Final result Specimen: Blood from Arm, Left Updated: 12/12/23 1800     Protime 13.5 seconds      INR 1.01    APTT [[id number]]  (Normal) Collected: 12/12/23 1720    Lab Status: Final result Specimen: Blood from Arm, Left Updated: 12/12/23 1800     PTT 29 seconds     CBC and differential [[id number]]  (Abnormal) Collected: 12/12/23 1720    Lab Status: Final result Specimen: Blood from Arm, Left Updated: 12/12/23 1748     WBC 21.39 Thousand/uL      RBC 4.86 Million/uL      Hemoglobin 14.3 g/dL      Hematocrit 43.1 %      MCV 89 fL      MCH 29.4 pg      MCHC 33.2 g/dL      RDW 14.7 %      MPV 8.1 fL      Platelets 973 Thousands/uL      nRBC 0 /100 WBCs      Neutrophils Relative 77 %      Immat GRANS % 1 %      Lymphocytes Relative 11 %      Monocytes Relative 8 %      Eosinophils Relative 2 %      Basophils Relative 1 %      Neutrophils Absolute 16.55 Thousands/µL      Immature Grans Absolute 0.13 Thousand/uL      Lymphocytes Absolute 2.42 Thousands/µL      Monocytes Absolute 1.75 Thousand/µL      Eosinophils Absolute 0.42 Thousand/µL      Basophils Absolute 0.12 Thousands/µL                    CT head without contrast   Final Result by Ralph Yoder MD (12/12 1812)      No acute intracranial abnormality. Workstation performed: [id number]         CT cervical spine without contrast   Final Result by Bassam Skinner MD (12/12 1814)      No cervical spine fracture or traumatic malalignment. Moderate cervical spondylosis. Workstation performed: [id number]         CT chest abdomen pelvis wo contrast   Final Result by Bassam Skinner MD (12/12 1826)      No acute traumatic CT findings. Small cluster of tree-in-bud type opacities in the left lower lobe likely infectious/inflammatory in etiology. Probable mild constipation. Mild fecal impaction. Suprapubic catheter. Workstation performed: [id number]         CT recon only thoracolumbar (No Charge)   Final Result by Bassam Skinner MD (12/12 1827)      No fracture or traumatic subluxation.                Workstation performed: [id number]                    Procedures  ECG 12 Lead Documentation Only    Date/Time: 12/12/2023 8:40 PM    Performed by: Christine Grimaldo MD  Authorized by: Christine Grimaldo MD    Indications / Diagnosis:  Fall  ECG reviewed by me, the ED Provider: yes    Patient location:  ED  Interpretation:     Interpretation: normal    Rate:     ECG rate assessment: normal    Rhythm:     Rhythm: sinus rhythm    Ectopy:     Ectopy: none    QRS:     QRS axis:  Normal  Conduction:     Conduction: normal    ST segments:     ST segments:  Normal  T waves:     T waves: normal             ED Course  ED Course as of 12/13/23 1934   Tue Dec 12, 2023   1941 WBC(!): 21.39   1941 Hemoglobin: 14.3   1941 Platelet Count: 071   1941 Sodium(!): 132   1941 Potassium: 4.2   1941 BUN: 10   1941 Creatinine: 1.05   1941 Glucose, Random: 131   1941 Leukocytes, UA(!): Large  Will give antibiotics for UTI.   1941 WBC, UA(!): Innumerable  Lab, urine results reviewed, leukocytosis, leukocytes in urine noted, patient allergic to Keflex with rash, will give levofloxacin, admit for UTI, fall, ambulatory dysfunction, generalized weakness. 2000 CT head without contrast  IMPRESSION:     No acute intracranial abnormality. 2000 CT cervical spine without contrast  IMPRESSION:     No cervical spine fracture or traumatic malalignment. Moderate cervical spondylosis. 2001 CT chest abdomen pelvis wo contrast  IMPRESSION:     No acute traumatic CT findings. Small cluster of tree-in-bud type opacities in the left lower lobe likely infectious/inflammatory in etiology. Probable mild constipation. Mild fecal impaction. Suprapubic catheter. 2001 CT recon only thoracolumbar (No Charge)  IMPRESSION:     No fracture or traumatic subluxation. 2005 ER workup results reviewed, will admit for UTI, leukocytosis, fall, ambulatory dysfunction. Initial Sepsis Screening       Row Name 12/12/23 2018                Is the patient's history suggestive of a new or worsening infection? Yes (Proceed)  -SA        Suspected source of infection urinary tract infection  -SA        Indicate SIRS criteria Tachycardia > 90 bpm;Leukocytosis (WBC > 64256 IJL) OR Leukopenia (WBC <4000 IJL) OR Bandemia (WBC >10% bands)  -SA        Are two or more of the above signs & symptoms of infection both present and new to the patient? Yes (Proceed)  -SA        Assess for evidence of organ dysfunction: Are any of the below criteria present within 6 hours of suspected infection and SIRS criteria that are NOT considered to be chronic conditions? --  No signs of severe sepsis, Lactic not >2  -SA                  User Key  (r) = Recorded By, (t) = Taken By, (c) = Cosigned By      [address] [place] [address] Name Provider Type     Panda Rincon MD Physician                    SBIRT 22yo+      Flowsheet Row Most Recent Value   Initial Alcohol Screen: US AUDIT-C     1. How often do you have a drink containing alcohol? 0 Filed at: 12/12/2023 0128   2.  How many drinks containing alcohol do you have on a typical day you are drinking?  0 Filed at: 12/12/2023 1704   3a. Male UNDER 65: How often do you have five or more drinks on one occasion? 0 Filed at: 12/12/2023 1704   3b. FEMALE Any Age, or MALE 65+: How often do you have 4 or more drinks on one occassion? 0 Filed at: 12/12/2023 1704   Audit-C Score 0 Filed at: 12/12/2023 1704   ALPESH: How many times in the past year have you. .. Used an illegal drug or used a prescription medication for non-medical reasons? Never Filed at: 12/12/2023 1704                      Medical Decision Making  Patient is a 65-year-old male, history of hypertension, diabetes, MS, nephrolithiasis, indwelling suprapubic catheter, comes in with history of fall, patient slid down his bed, hit head, no loss of consciousness, landed on his back, complains of back pain, patient usually gets around in wheelchair, denies recent illness, fever, chills, headache, chest pain, abdominal pain. On exam, patient is conscious, alert, vital signs are stable, lungs are clear, heart sounds regular, abdomen is soft, nontender, indwelling suprapubic catheter in place, mild tenderness to the mid back. Differential diagnosis: Mechanical fall, hit head, back pain, indwelling suprapubic catheter, history of nephrolithiasis, multiple comorbidities, will check medical workup including labs, EKG, urine, get scans to rule out intracranial hemorrhage, thoracic lumbar fracture, recurrent nephrolithiasis in setting of suprapubic catheter, UTI. Problems Addressed:  Fall, initial encounter: acute illness or injury  Leucocytosis: acute illness or injury  UTI (urinary tract infection): acute illness or injury    Amount and/or Complexity of Data Reviewed  Labs: ordered. Decision-making details documented in ED Course. Radiology: ordered. Decision-making details documented in ED Course. ECG/medicine tests: ordered and independent interpretation performed. Decision-making details documented in ED Course. Risk  Prescription drug management.   Decision regarding hospitalization. Disposition  Final diagnoses:   Fall, initial encounter   Leucocytosis   UTI (urinary tract infection)     Time reflects when diagnosis was documented in both MDM as applicable and the Disposition within this note       Time User Action Codes Description Comment    12/12/2023  8:41 PM Norlin Abts Add [F68. Abhi Mendes Fall, initial encounter     12/12/2023  8:41 PM Norlin Abts Add [P53.631] Leucocytosis     12/12/2023  8:41 PM Norlin Abts Add [N39.0] UTI (urinary tract infection)           ED Disposition       ED Disposition   Admit    Condition   Stable    Date/Time   Tue Dec 12, 2023 2041    Comment   Case was discussed with Juhi Wiley and the patient's admission status was agreed to be Admission Status: inpatient status to the service of Dr. Karla Purcell.               Follow-up Information    None         Current Discharge Medication List        CONTINUE these medications which have NOT CHANGED    Details   albuterol (2.5 mg/3 mL) 0.083 % nebulizer solution Take 3 mL (2.5 mg total) by nebulization every 6 (six) hours as needed for wheezing or shortness of breath  Qty: 60 mL, Refills: 3    Associated Diagnoses: Shortness of breath      albuterol (Ventolin HFA) 90 mcg/act inhaler Inhale 2 puffs every 6 (six) hours as needed for wheezing  Qty: 18 g, Refills: 5    Comments: Substitution to a formulary equivalent within the same pharmaceutical class is authorized. Associated Diagnoses: Shortness of breath      ALPRAZolam (XANAX) 0.25 mg tablet Take 0.25 mg by mouth 2 (two) times a day as needed for anxiety or sleep       amLODIPine-atorvastatin (CADUET) 10-80 MG per tablet Take 1 tablet by mouth daily      clopidogrel (PLAVIX) 75 mg tablet Take 1 tablet (75 mg total) by mouth daily  Refills: 0    Associated Diagnoses: Chronic suprapubic catheter ([address]);  Neurogenic bladder; Cellulitis      Dulaglutide (Trulicity) 0.29 GW/7.5EQ SOPN Inject 0.75 mg under the skin once a week Ergocalciferol (VITAMIN D2 PO) Take 50,000 Units by mouth once a week      furosemide (LASIX) 40 mg tablet Take 40 mg by mouth daily      !! gabapentin (NEURONTIN) 300 mg capsule Take 1 capsule (300 mg total) by mouth 2 (two) times a day  Qty: 60 capsule, Refills: 0    Associated Diagnoses: Multiple sclerosis ([address])      ! ! gabapentin (NEURONTIN) 300 mg capsule Take 2 capsules (600 mg total) by mouth daily at bedtime  Qty: 30 capsule, Refills: 0    Associated Diagnoses: Multiple sclerosis (HCC)      latanoprost (XALATAN) 0.005 % ophthalmic solution Administer 1 drop to both eyes daily at bedtime      melatonin 3 mg Take 3 mg by mouth daily at bedtime as needed      pantoprazole (PROTONIX) 40 mg tablet TAKE 1 TABLET TWICE DAILY 30 MINUTES BEFORE BREAKFAST AND DINNER. Qty: 180 tablet, Refills: 1    Associated Diagnoses: Gastroesophageal reflux disease without esophagitis      polyethylene glycol (MIRALAX) 17 g packet Take 17 g by mouth daily as needed      potassium chloride (Klor-Con) 10 mEq tablet Take 10 mEq by mouth 2 (two) times a day      propranolol (INDERAL LA) 60 mg 24 hr capsule Take 60 mg by mouth daily      senna-docusate sodium (SENOKOT S) 8.6-50 mg per tablet Take 2 tablets by mouth daily at bedtime      sertraline (ZOLOFT) 25 mg tablet Take 25 mg by mouth daily at bedtime       !! - Potential duplicate medications found. Please discuss with provider. No discharge procedures on file.     PDMP Review         Value Time User    PDMP Reviewed  Yes 8/19/2023  1:22 AM Karel Stovall, [address] [facility] Provider  Electronically Signed by             Lynn Robert MD  12/13/23 4185

## 2023-12-14 PROBLEM — N39.0 SEPSIS DUE TO URINARY TRACT INFECTION: Status: ACTIVE | Noted: 2023-12-12

## 2023-12-14 PROBLEM — A41.9 SEPSIS DUE TO URINARY TRACT INFECTION: Status: ACTIVE | Noted: 2023-12-12

## 2023-12-14 LAB
ANION GAP SERPL CALCULATED.3IONS-SCNC: 9 MMOL/L
BACTERIA UR CULT: ABNORMAL
BACTERIA UR CULT: ABNORMAL
BASOPHILS # BLD AUTO: 0.1 THOUSANDS/ÂΜL (ref 0–0.1)
BASOPHILS NFR BLD AUTO: 1 % (ref 0–1)
BUN SERPL-MCNC: 9 MG/DL (ref 5–25)
CALCIUM SERPL-MCNC: 10.1 MG/DL (ref 8.4–10.2)
CHLORIDE SERPL-SCNC: 101 MMOL/L (ref 96–108)
CO2 SERPL-SCNC: 25 MMOL/L (ref 21–32)
CREAT SERPL-MCNC: 0.89 MG/DL (ref 0.6–1.3)
EOSINOPHIL # BLD AUTO: 0.86 THOUSAND/ÂΜL (ref 0–0.61)
EOSINOPHIL NFR BLD AUTO: 8 % (ref 0–6)
ERYTHROCYTE [DISTWIDTH] IN BLOOD BY AUTOMATED COUNT: 14.6 % (ref 11.6–15.1)
GFR SERPL CREATININE-BSD FRML MDRD: 84 ML/MIN/1.73SQ M
GLUCOSE SERPL-MCNC: 100 MG/DL (ref 65–140)
GLUCOSE SERPL-MCNC: 101 MG/DL (ref 65–140)
GLUCOSE SERPL-MCNC: 115 MG/DL (ref 65–140)
GLUCOSE SERPL-MCNC: 137 MG/DL (ref 65–140)
GLUCOSE SERPL-MCNC: 140 MG/DL (ref 65–140)
GLUCOSE SERPL-MCNC: 170 MG/DL (ref 65–140)
HCT VFR BLD AUTO: 40 % (ref 36.5–49.3)
HGB BLD-MCNC: 13.2 G/DL (ref 12–17)
IMM GRANULOCYTES # BLD AUTO: 0.04 THOUSAND/UL (ref 0–0.2)
IMM GRANULOCYTES NFR BLD AUTO: 0 % (ref 0–2)
LYMPHOCYTES # BLD AUTO: 3.48 THOUSANDS/ÂΜL (ref 0.6–4.47)
LYMPHOCYTES NFR BLD AUTO: 30 % (ref 14–44)
MCH RBC QN AUTO: 29.3 PG (ref 26.8–34.3)
MCHC RBC AUTO-ENTMCNC: 33 G/DL (ref 31.4–37.4)
MCV RBC AUTO: 89 FL (ref 82–98)
MONOCYTES # BLD AUTO: 1.11 THOUSAND/ÂΜL (ref 0.17–1.22)
MONOCYTES NFR BLD AUTO: 10 % (ref 4–12)
NEUTROPHILS # BLD AUTO: 5.9 THOUSANDS/ÂΜL (ref 1.85–7.62)
NEUTS SEG NFR BLD AUTO: 51 % (ref 43–75)
NRBC BLD AUTO-RTO: 0 /100 WBCS
PLATELET # BLD AUTO: 337 THOUSANDS/UL (ref 149–390)
PMV BLD AUTO: 8.2 FL (ref 8.9–12.7)
POTASSIUM SERPL-SCNC: 3.4 MMOL/L (ref 3.5–5.3)
RBC # BLD AUTO: 4.51 MILLION/UL (ref 3.88–5.62)
SODIUM SERPL-SCNC: 135 MMOL/L (ref 135–147)
WBC # BLD AUTO: 11.49 THOUSAND/UL (ref 4.31–10.16)

## 2023-12-14 PROCEDURE — 82948 REAGENT STRIP/BLOOD GLUCOSE: CPT

## 2023-12-14 PROCEDURE — 80048 BASIC METABOLIC PNL TOTAL CA: CPT | Performed by: INTERNAL MEDICINE

## 2023-12-14 PROCEDURE — 85025 COMPLETE CBC W/AUTO DIFF WBC: CPT | Performed by: INTERNAL MEDICINE

## 2023-12-14 PROCEDURE — 99232 SBSQ HOSP IP/OBS MODERATE 35: CPT | Performed by: INTERNAL MEDICINE

## 2023-12-14 RX ADMIN — CEFTRIAXONE 1000 MG: 1 INJECTION, SOLUTION INTRAVENOUS at 20:37

## 2023-12-14 RX ADMIN — LATANOPROST 1 DROP: 50 SOLUTION/ DROPS OPHTHALMIC at 21:21

## 2023-12-14 RX ADMIN — HEPARIN SODIUM 5000 UNITS: 5000 INJECTION INTRAVENOUS; SUBCUTANEOUS at 13:15

## 2023-12-14 RX ADMIN — DICLOFENAC SODIUM 2 G: 10 GEL TOPICAL at 20:41

## 2023-12-14 RX ADMIN — GABAPENTIN 300 MG: 300 CAPSULE ORAL at 08:57

## 2023-12-14 RX ADMIN — PROPRANOLOL HYDROCHLORIDE 60 MG: 60 CAPSULE, EXTENDED RELEASE ORAL at 08:57

## 2023-12-14 RX ADMIN — MICONAZOLE NITRATE 1 APPLICATION: 20 CREAM TOPICAL at 08:57

## 2023-12-14 RX ADMIN — GABAPENTIN 600 MG: 300 CAPSULE ORAL at 20:53

## 2023-12-14 RX ADMIN — HEPARIN SODIUM 5000 UNITS: 5000 INJECTION INTRAVENOUS; SUBCUTANEOUS at 20:54

## 2023-12-14 RX ADMIN — CLOPIDOGREL BISULFATE 75 MG: 75 TABLET ORAL at 08:58

## 2023-12-14 RX ADMIN — GABAPENTIN 300 MG: 300 CAPSULE ORAL at 17:11

## 2023-12-14 RX ADMIN — ATORVASTATIN CALCIUM 80 MG: 80 TABLET, FILM COATED ORAL at 17:12

## 2023-12-14 RX ADMIN — ACETAMINOPHEN 325MG 975 MG: 325 TABLET ORAL at 20:51

## 2023-12-14 RX ADMIN — AMLODIPINE BESYLATE 10 MG: 10 TABLET ORAL at 08:58

## 2023-12-14 RX ADMIN — HEPARIN SODIUM 5000 UNITS: 5000 INJECTION INTRAVENOUS; SUBCUTANEOUS at 05:31

## 2023-12-14 RX ADMIN — SERTRALINE HYDROCHLORIDE 25 MG: 25 TABLET ORAL at 20:53

## 2023-12-14 NOTE — PROGRESS NOTES
[address] [facility]  Progress Note  Name: Austin Sierra  MRN: [id number]  Unit/Bed#: E4 -01 I Date of Admission: 12/12/2023   Date of Service: 12/14/2023 I Hospital Day: 2    Assessment/Plan   * Fall  Assessment & Plan  Multifactorial due to advanced age, history of stroke, deconditioning  CT showed no traumatic injury. Discussed possible rehab placement which he is against.  Fall precautions  Anticipate dc to home with family support and     Sepsis due to urinary tract infection   Assessment & Plan  Sepsis POA due to CAUTI  He has negative  procalcitonin, low suspicion for pneumonia. Urine culture is + for klebsiella  Awaiting sensitivities  Continue empiric ceftriaxone    Urinary retention  Assessment & Plan  With chronic suprapubic catheter and CAUTI  Continue empiric ceftriaxone. Follow-up sensitivities    Type 2 diabetes mellitus with hyperglycemia, without long-term current use of insulin (HCC)  Assessment & Plan    Lab Results   Component Value Date    HGBA1C 8.1 (H) 12/04/2023     Continue sliding scale insulin and diabetic diet    Primary hypertension  Assessment & Plan  Continue propranolol and Norvasc with hold parameters    History of CVA (cerebrovascular accident)  Assessment & Plan  Continue Plavix and Lipitor         VTE Pharmacologic Prophylaxis: VTE Score: 4 Moderate Risk (Score 3-4) - Pharmacological DVT Prophylaxis Ordered: heparin. Patient Centered Rounds: I performed bedside rounds with nursing staff today. Discussions with Specialists or Other Care Team Provider: CM  Education and Discussions with Family / Patient: Updated  (brother) via phone. Current Length of Stay: 2 day(s)  Current Patient Status: Inpatient   Certification Statement: The patient will continue to require additional inpatient hospital stay due to UTI  Discharge Plan: Anticipate discharge tomorrow to home with home services.     Code Status: Level 1 - Full Code    Subjective:   No fever  No cough  No abdominal pain    Objective:     Vitals:   Temp (24hrs), Av.3 °F (36.3 °C), Min:97.1 °F (36.2 °C), Max:97.5 °F (36.4 °C)    Temp:  [97.1 °F (36.2 °C)-97.5 °F (36.4 °C)] 97.1 °F (36.2 °C)  HR:  [89-99] 99  Resp:  [16-18] 18  BP: (112-142)/(55-68) 142/65  SpO2:  [92 %-96 %] 95 %  Body mass index is 24.38 kg/m². Input and Output Summary (last 24 hours): Intake/Output Summary (Last 24 hours) at 2023 1119  Last data filed at 2023 0601  Gross per 24 hour   Intake --   Output 2600 ml   Net -2600 ml       Physical Exam:   Physical Exam  Constitutional:       Appearance: He is not ill-appearing. HENT:      Head: Normocephalic and atraumatic. Nose: No congestion or rhinorrhea. Eyes:      General: No scleral icterus. Cardiovascular:      Rate and Rhythm: Normal rate and regular rhythm. Pulmonary:      Breath sounds: No wheezing or rhonchi. Abdominal:      Palpations: Abdomen is soft. Tenderness: There is no abdominal tenderness. There is no guarding. Musculoskeletal:         General: No deformity or signs of injury. Skin:     General: Skin is warm and dry. Coloration: Skin is not jaundiced or pale. Neurological:      Mental Status: He is alert.    Psychiatric:         Mood and Affect: Mood normal.         Behavior: Behavior normal.          Additional Data:     Labs:  Results from last 7 days   Lab Units 23  0533   WBC Thousand/uL 11.49*   HEMOGLOBIN g/dL 13.2   HEMATOCRIT % 40.0   PLATELETS Thousands/uL 337   NEUTROS PCT % 51   LYMPHS PCT % 30   MONOS PCT % 10   EOS PCT % 8*     Results from last 7 days   Lab Units 23  0533 23  0556 23  1720   SODIUM mmol/L 135   < > 132*   POTASSIUM mmol/L 3.4*   < > 4.2   CHLORIDE mmol/L 101   < > 98   CO2 mmol/L 25   < > 26   BUN mg/dL 9   < > 10   CREATININE mg/dL 0.89   < > 1.05   ANION GAP mmol/L 9   < > 8   CALCIUM mg/dL 10.1   < > 10.3*   ALBUMIN g/dL  --   --  3.7 TOTAL BILIRUBIN mg/dL  --   --  0.81   ALK PHOS U/L  --   --  68   ALT U/L  --   --  8   AST U/L  --   --  10*   GLUCOSE RANDOM mg/dL 101   < > 131    < > = values in this interval not displayed. Results from last 7 days   Lab Units 12/12/23  1720   INR  1.01     Results from last 7 days   Lab Units 12/14/23  0719 12/13/23  2116 12/13/23  1617 12/13/23  1110 12/13/23  0743   POC GLUCOSE mg/dl 100 125 121 114 79         Results from last 7 days   Lab Units 12/13/23  0556 12/12/23 2123 12/12/23  1956   LACTIC ACID mmol/L  --   --  2.0   PROCALCITONIN ng/ml 0.14 0.15  --        Lines/Drains:  Invasive Devices       Peripheral Intravenous Line  Duration             Peripheral IV 12/12/23 Left Antecubital 1 day              Drain  Duration             Suprapubic Catheter 20 Fr. 172 days                          Imaging: Reviewed radiology reports from this admission including: chest CT scan and abdominal/pelvic CT    Recent Cultures (last 7 days):   Results from last 7 days   Lab Units 12/12/23 1956 12/12/23  1934 12/12/23  1741   BLOOD CULTURE  No Growth at 24 hrs.  No Growth at 24 hrs.  --    URINE CULTURE   --   --  >100,000 cfu/ml Klebsiella pneumoniae*       Last 24 Hours Medication List:   Current Facility-Administered Medications   Medication Dose Route Frequency Provider Last Rate    acetaminophen  975 mg Oral Q8H PRN Eloina Bundy PA-C      ALPRAZolam  0.25 mg Oral BID PRN Harper Pollack PA-C      amLODIPine  10 mg Oral Daily Harper Pollack PA-C      atorvastatin  80 mg Oral Daily With [employer] SHARI Pollack      cefTRIAXone  1,000 mg Intravenous Q24H Harper Pollack PA-C 1,000 mg (12/13/23 2119)    clopidogrel  75 mg Oral Daily Harper Pollack PA-C      Diclofenac Sodium  2 g Topical Q4H PRN Eloinacamryn Bundy PA-C      gabapentin  300 mg Oral BID Harper Pollack PA-C      gabapentin  600 mg Oral HS Harper M Delabre, PA-C      heparin (porcine)  5,000 Units Subcutaneous [facility] Harper IVORY Pollack PA-C      insulin lispro  1-5 Units Subcutaneous 4x Daily (AC & HS) Harper Pollack PA-C      latanoprost  1 drop Both Eyes HS Harper Pollack PA-C      AMPARO ANTIFUNGAL   Topical BID Jason Mayo MD      ondansetron  4 mg Intravenous Q6H PRN Harper Pollack PA-C      propranolol  60 mg Oral Daily Harper Pollack PA-C      sertraline  25 mg Oral HS Harper Lafleur PA-C          Today, Patient Was Seen By: Jason Mayo MD    **Please Note: This note may have been constructed using a voice recognition system. **

## 2023-12-14 NOTE — WOUND OSTOMY CARE
Consult Note - Wound   Rich Endo 75 y.o. male MRN: [id number]  Unit/Bed#: E4 -01 Encounter: [id number]      History and Present Illness:  75year old male presented to the hospital status post fall. Patient's history significant for suprapubic catheter, HTN, DM, CVA, CHF, MS. Assessment Findings:   Patient agreeable to assessment. He is able to turn in bed with assist x 1. Suprapubic catheter in place. Bilateral heels intact, pink, blanchable. Lower extremities stiff/tense (right worse than left). Present on admission wound to right buttock of mixed etiology--pressure and fungal rash. Wound bed pink and beefy red, non-blanchable. Partial thickness. No drainage. Rosa-wound intact and scaly. Likely fungal component. MASD with candidiasis to right groin fold and scrotum--beefy red, foul smelling, epidermal erosion, moist with scant serous drainage. Rosa-wound erythematous, scaly. See flowsheet for wound details. Wound Care Plan:   1-Apply Allevyn Life foam dressing to bilateral heels for prevention. Jerome with P.  Peel back at least daily for skin assessment and re-apply. Change dressing every 3 days and PRN. 2-Elevate heels off of bed/chair surface to offload pressure. 3-Offloading air cushion in chair when out of bed. 4-Moisturize skin daily with skin nourishing lotion. 5-Turn/reposition every 2 hours while in bed; and weight shift frequently while in chair for pressure re-distribution on skin. 6-Buttocks, groin folds, scrotum--cleanse with soap and water, pat dry. Apply Emeka cream twice daily and as needed with any incontinence/rosa care. Wound care team to follow. Plan of care reviewed with primary RN.     Wound 12/12/23 Pressure Injury Buttocks Right (Active)   Wound Image   12/13/23 1300   Wound Description Non-blanchable erythema;Pink 12/13/23 1300   Rosa-wound Assessment Intact;Scaly 12/13/23 1300   Wound Length (cm) 4 cm 12/13/23 1300   Wound Width (cm) 1.5 cm 12/13/23 1300   Wound Depth (cm) 0.1 cm 12/13/23 1300   Wound Surface Area (cm^2) 6 cm^2 12/13/23 1300   Wound Volume (cm^3) 0.6 cm^3 12/13/23 1300   Calculated Wound Volume (cm^3) 0.6 cm^3 12/13/23 1300   Drainage Amount None 12/13/23 1300   Non-staged Wound Description Partial thickness 12/13/23 1300       Wound 12/12/23 MASD Groin Anterior;Proximal;Right (Active)   Wound Image   12/13/23 1305   Wound Length (cm) 13 cm 12/13/23 1305   Wound Width (cm) 6 cm 12/13/23 1305   Wound Depth (cm) 0.1 cm 12/13/23 1305   Wound Surface Area (cm^2) 78 cm^2 12/13/23 1305   Wound Volume (cm^3) 7.8 cm^3 12/13/23 1305   Calculated Wound Volume (cm^3) 7.8 cm^3 12/13/23 1305   Drainage Amount Scant 12/13/23 1305   Drainage Description Serous; Foul smelling 12/13/23 1305   Non-staged Wound Description Partial thickness 12/13/23 2500 Martha MCARTHURN, RN, [employer]

## 2023-12-14 NOTE — PLAN OF CARE
Problem: Potential for Falls  Goal: Patient will remain free of falls  Description: INTERVENTIONS:  - Educate patient/family on patient safety including physical limitations  - Instruct patient to call for assistance with activity   - Consult OT/PT to assist with strengthening/mobility   - Keep Call bell within reach  - Keep bed low and locked with side rails adjusted as appropriate  - Keep care items and personal belongings within reach  - Initiate and maintain comfort rounds  - Make Fall Risk Sign visible to staff  - Offer Toileting every 2 Hours, in advance of need  - Initiate/Maintain bed alarm  - Obtain necessary fall risk management equipment: bed alarm  - Apply yellow socks and bracelet for high fall risk patients  - Consider moving patient to room near nurses station  Outcome: Progressing     Problem: PAIN - ADULT  Goal: Verbalizes/displays adequate comfort level or baseline comfort level  Description: Interventions:  - Encourage patient to monitor pain and request assistance  - Assess pain using appropriate pain scale  - Administer analgesics based on type and severity of pain and evaluate response  - Implement non-pharmacological measures as appropriate and evaluate response  - Consider cultural and social influences on pain and pain management  - Notify physician/advanced practitioner if interventions unsuccessful or patient reports new pain  Outcome: Progressing     Problem: INFECTION - ADULT  Goal: Absence or prevention of progression during hospitalization  Description: INTERVENTIONS:  - Assess and monitor for signs and symptoms of infection  - Monitor lab/diagnostic results  - Monitor all insertion sites, i.e. indwelling lines, tubes, and drains  - Monitor endotracheal if appropriate and nasal secretions for changes in amount and color  - [place] appropriate cooling/warming therapies per order  - Administer medications as ordered  - Instruct and encourage patient and family to use good hand hygiene technique  - Identify and instruct in appropriate isolation precautions for identified infection/condition  Outcome: Progressing  Goal: Absence of fever/infection during neutropenic period  Description: INTERVENTIONS:  - Monitor WBC    Outcome: Progressing     Problem: SAFETY ADULT  Goal: Patient will remain free of falls  Description: INTERVENTIONS:  - Educate patient/family on patient safety including physical limitations  - Instruct patient to call for assistance with activity   - Consult OT/PT to assist with strengthening/mobility   - Keep Call bell within reach  - Keep bed low and locked with side rails adjusted as appropriate  - Keep care items and personal belongings within reach  - Initiate and maintain comfort rounds  - Make Fall Risk Sign visible to staff  - Offer Toileting every 2 Hours, in advance of need  - Initiate/Maintain bed alarm  - Obtain necessary fall risk management equipment: bed alarm  - Apply yellow socks and bracelet for high fall risk patients  - Consider moving patient to room near nurses station  Outcome: Progressing  Goal: Maintain or return to baseline ADL function  Description: INTERVENTIONS:  -  Assess patient's ability to carry out ADLs; assess patient's baseline for ADL function and identify physical deficits which impact ability to perform ADLs (bathing, care of mouth/teeth, toileting, grooming, dressing, etc.)  - Assess/evaluate cause of self-care deficits   - Assess range of motion  - Assess patient's mobility; develop plan if impaired  - Assess patient's need for assistive devices and provide as appropriate  - Encourage maximum independence but intervene and supervise when necessary  - Involve family in performance of ADLs  - Assess for home care needs following discharge   - Consider OT consult to assist with ADL evaluation and planning for discharge  - Provide patient education as appropriate  Outcome: Progressing  Goal: Maintains/Returns to pre admission functional level  Description: INTERVENTIONS:  - Perform AM-PAC 6 Click Basic Mobility/ Daily Activity assessment daily.  - Set and communicate daily mobility goal to care team and patient/family/caregiver. - Collaborate with rehabilitation services on mobility goals if consulted  - Stand patient 3 times a day  - Ambulate patient 3 times a day  - Out of bed to chair 3 times a day   - Out of bed for meals 3 times a day  - Out of bed for toileting  - Record patient progress and toleration of activity level   Outcome: Progressing     Problem: DISCHARGE PLANNING  Goal: Discharge to home or other facility with appropriate resources  Description: INTERVENTIONS:  - Identify barriers to discharge w/patient and caregiver  - Arrange for needed discharge resources and transportation as appropriate  - Identify discharge learning needs (meds, wound care, etc.)  - Arrange for interpretive services to assist at discharge as needed  - Refer to Case Management Department for coordinating discharge planning if the patient needs post-hospital services based on physician/advanced practitioner order or complex needs related to functional status, cognitive ability, or social support system  Outcome: Progressing     Problem: Knowledge Deficit  Goal: Patient/family/caregiver demonstrates understanding of disease process, treatment plan, medications, and discharge instructions  Description: Complete learning assessment and assess knowledge base.   Interventions:  - Provide teaching at level of understanding  - Provide teaching via preferred learning methods  Outcome: Progressing     Problem: Prexisting or High Potential for Compromised Skin Integrity  Goal: Skin integrity is maintained or improved  Description: INTERVENTIONS:  - Identify patients at risk for skin breakdown  - Assess and monitor skin integrity  - Assess and monitor nutrition and hydration status  - Monitor labs   - Assess for incontinence   - Turn and reposition patient  - Assist with mobility/ambulation  - Relieve pressure over bony prominences  - Avoid friction and shearing  - Provide appropriate hygiene as needed including keeping skin clean and dry  - Evaluate need for skin moisturizer/barrier cream  - Collaborate with interdisciplinary team   - Patient/family teaching  - Consider wound care consult   Outcome: Progressing

## 2023-12-14 NOTE — ASSESSMENT & PLAN NOTE
Lab Results   Component Value Date    HGBA1C 8.1 (H) 12/04/2023     Continue sliding scale insulin and diabetic diet

## 2023-12-14 NOTE — ASSESSMENT & PLAN NOTE
Sepsis POA due to CAUTI  He has negative  procalcitonin, low suspicion for pneumonia.   Urine culture is + for klebsiella  Awaiting sensitivities  Continue empiric ceftriaxone

## 2023-12-14 NOTE — UTILIZATION REVIEW
Notification of Unplanned, Urgent, or   Emergency Inpatient Admission   [address] [facility], [facility] [address]  Tax ID: [id number]  NPI: [id number]  Place of Service: [address] [facility]  Admission Level of Care: Inpatient  Place of Service Code: 24     Attending Physician Information  Attending Name and NPI#: [id number] [place], [place] [[id number]]  Phone: [phone]     Admission Information  Inpatient Admission Date/Time: 12/12/23  8:44 PM  Discharge Date/Time: No discharge date for patient encounter. Admitting Diagnosis Code/Description:  Leucocytosis [D72.829]  UTI (urinary tract infection) [N39.0]  Head injury [S09.90XA]  Fall in elderly patient [R29.6]     Utilization Review Contact  Gabino Rojas Utilization   Phone: [phone]  Fax: [phone]  Email: [email or web address]  Contact for approvals/pending authorizations, clinical reviews, and discharge. Physician Advisory Services Contact  Medical Necessity Denial & [facility] Discussion  Phone: [phone]  Fax: [phone]  Email: [email or web address]     DISCHARGE SUPPORT TEAM:  For Patients Discharge Needs & Updates  Phone: [phone] opt. 2 Fax: [phone]  Email: [email or web address]

## 2023-12-14 NOTE — DISCHARGE INSTR - OTHER ORDERS
Wound Care Plan:   1-Apply Hydraguard lotion to bilateral heels twice daily for skin protection/hydration. 2-Elevate heels off of bed/chair surface to offload pressure. 3-Offloading air cushion in chair when out of bed. 4-Moisturize skin daily with skin nourishing lotion. 5-Turn/reposition every 2 hours while in bed; and weight shift frequently while in chair for pressure re-distribution on skin. 6-Buttocks, groin folds, scrotum--cleanse with soap and water, pat dry. Apply Emeka cream twice daily and as needed with any incontinence/austin care.

## 2023-12-14 NOTE — ASSESSMENT & PLAN NOTE
Multifactorial due to advanced age, history of stroke, deconditioning  CT showed no traumatic injury.   Discussed possible rehab placement which he is against.  Fall precautions  Anticipate dc to home with family support and [facility]

## 2023-12-15 VITALS
WEIGHT: 155.65 LBS | BODY MASS INDEX: 24.43 KG/M2 | OXYGEN SATURATION: 95 % | RESPIRATION RATE: 18 BRPM | HEART RATE: 82 BPM | HEIGHT: 67 IN | TEMPERATURE: 97.6 F | SYSTOLIC BLOOD PRESSURE: 123 MMHG | DIASTOLIC BLOOD PRESSURE: 59 MMHG

## 2023-12-15 LAB
GLUCOSE SERPL-MCNC: 106 MG/DL (ref 65–140)
GLUCOSE SERPL-MCNC: 127 MG/DL (ref 65–140)

## 2023-12-15 PROCEDURE — 82948 REAGENT STRIP/BLOOD GLUCOSE: CPT

## 2023-12-15 PROCEDURE — 99239 HOSP IP/OBS DSCHRG MGMT >30: CPT | Performed by: INTERNAL MEDICINE

## 2023-12-15 RX ORDER — SULFAMETHOXAZOLE AND TRIMETHOPRIM 800; 160 MG/1; MG/1
1 TABLET ORAL EVERY 12 HOURS SCHEDULED
Status: DISCONTINUED | OUTPATIENT
Start: 2023-12-15 | End: 2023-12-15 | Stop reason: HOSPADM

## 2023-12-15 RX ORDER — SULFAMETHOXAZOLE AND TRIMETHOPRIM 800; 160 MG/1; MG/1
1 TABLET ORAL EVERY 12 HOURS SCHEDULED
Qty: 10 TABLET | Refills: 0 | Status: SHIPPED | OUTPATIENT
Start: 2023-12-15 | End: 2023-12-20

## 2023-12-15 RX ADMIN — MICONAZOLE NITRATE: 20 CREAM TOPICAL at 08:41

## 2023-12-15 RX ADMIN — ACETAMINOPHEN 325MG 975 MG: 325 TABLET ORAL at 06:18

## 2023-12-15 RX ADMIN — DICLOFENAC SODIUM 2 G: 10 GEL TOPICAL at 06:19

## 2023-12-15 RX ADMIN — AMLODIPINE BESYLATE 10 MG: 10 TABLET ORAL at 08:41

## 2023-12-15 RX ADMIN — GABAPENTIN 300 MG: 300 CAPSULE ORAL at 08:41

## 2023-12-15 RX ADMIN — PROPRANOLOL HYDROCHLORIDE 60 MG: 60 CAPSULE, EXTENDED RELEASE ORAL at 08:41

## 2023-12-15 RX ADMIN — CLOPIDOGREL BISULFATE 75 MG: 75 TABLET ORAL at 08:41

## 2023-12-15 RX ADMIN — HEPARIN SODIUM 5000 UNITS: 5000 INJECTION INTRAVENOUS; SUBCUTANEOUS at 06:18

## 2023-12-15 NOTE — PLAN OF CARE
Problem: Potential for Falls  Goal: Patient will remain free of falls  Description: INTERVENTIONS:  - Educate patient/family on patient safety including physical limitations  - Instruct patient to call for assistance with activity   - Consult OT/PT to assist with strengthening/mobility   - Keep Call bell within reach  - Keep bed low and locked with side rails adjusted as appropriate  - Keep care items and personal belongings within reach  - Initiate and maintain comfort rounds  - Make Fall Risk Sign visible to staff  - Consider moving patient to room near nurses station  Outcome: Progressing     Problem: PAIN - ADULT  Goal: Verbalizes/displays adequate comfort level or baseline comfort level  Description: Interventions:  - Encourage patient to monitor pain and request assistance  - Assess pain using appropriate pain scale  - Administer analgesics based on type and severity of pain and evaluate response  - Implement non-pharmacological measures as appropriate and evaluate response  - Consider cultural and social influences on pain and pain management  - Notify physician/advanced practitioner if interventions unsuccessful or patient reports new pain  Outcome: Progressing     Problem: INFECTION - ADULT  Goal: Absence or prevention of progression during hospitalization  Description: INTERVENTIONS:  - Assess and monitor for signs and symptoms of infection  - Monitor lab/diagnostic results  - Monitor all insertion sites, i.e. indwelling lines, tubes, and drains  - Monitor endotracheal if appropriate and nasal secretions for changes in amount and color  - Winston appropriate cooling/warming therapies per order  - Administer medications as ordered  - Instruct and encourage patient and family to use good hand hygiene technique  - Identify and instruct in appropriate isolation precautions for identified infection/condition  Outcome: Progressing  Goal: Absence of fever/infection during neutropenic period  Description: INTERVENTIONS:  - Monitor WBC    Outcome: Progressing     Problem: SAFETY ADULT  Goal: Patient will remain free of falls  Description: INTERVENTIONS:  - Educate patient/family on patient safety including physical limitations  - Instruct patient to call for assistance with activity   - Consult OT/PT to assist with strengthening/mobility   - Keep Call bell within reach  - Keep bed low and locked with side rails adjusted as appropriate  - Keep care items and personal belongings within reach  - Initiate and maintain comfort rounds  - Make Fall Risk Sign visible to staff  - Consider moving patient to room near nurses station  Outcome: Progressing  Goal: Maintain or return to baseline ADL function  Description: INTERVENTIONS:  -  Assess patient's ability to carry out ADLs; assess patient's baseline for ADL function and identify physical deficits which impact ability to perform ADLs (bathing, care of mouth/teeth, toileting, grooming, dressing, etc.)  - Assess/evaluate cause of self-care deficits   - Assess range of motion  - Assess patient's mobility; develop plan if impaired  - Assess patient's need for assistive devices and provide as appropriate  - Encourage maximum independence but intervene and supervise when necessary  - Involve family in performance of ADLs  - Assess for home care needs following discharge   - Consider OT consult to assist with ADL evaluation and planning for discharge  - Provide patient education as appropriate  Outcome: Progressing  Goal: Maintains/Returns to pre admission functional level  Description: INTERVENTIONS:  - Perform AM-PAC 6 Click Basic Mobility/ Daily Activity assessment daily.  - Set and communicate daily mobility goal to care team and patient/family/caregiver.    - Collaborate with rehabilitation services on mobility goals if consulted  - Out of bed for toileting  - Record patient progress and toleration of activity level   Outcome: Progressing     Problem: DISCHARGE PLANNING  Goal: Discharge to home or other facility with appropriate resources  Description: INTERVENTIONS:  - Identify barriers to discharge w/patient and caregiver  - Arrange for needed discharge resources and transportation as appropriate  - Identify discharge learning needs (meds, wound care, etc.)  - Arrange for interpretive services to assist at discharge as needed  - Refer to Case Management Department for coordinating discharge planning if the patient needs post-hospital services based on physician/advanced practitioner order or complex needs related to functional status, cognitive ability, or social support system  Outcome: Progressing     Problem: Knowledge Deficit  Goal: Patient/family/caregiver demonstrates understanding of disease process, treatment plan, medications, and discharge instructions  Description: Complete learning assessment and assess knowledge base.   Interventions:  - Provide teaching at level of understanding  - Provide teaching via preferred learning methods  Outcome: Progressing     Problem: Prexisting or High Potential for Compromised Skin Integrity  Goal: Skin integrity is maintained or improved  Description: INTERVENTIONS:  - Identify patients at risk for skin breakdown  - Assess and monitor skin integrity  - Assess and monitor nutrition and hydration status  - Monitor labs   - Assess for incontinence   - Turn and reposition patient  - Assist with mobility/ambulation  - Relieve pressure over bony prominences  - Avoid friction and shearing  - Provide appropriate hygiene as needed including keeping skin clean and dry  - Evaluate need for skin moisturizer/barrier cream  - Collaborate with interdisciplinary team   - Patient/family teaching  - Consider wound care consult   Outcome: Progressing

## 2023-12-15 NOTE — ASSESSMENT & PLAN NOTE
Lab Results   Component Value Date    HGBA1C 8.1 (H) 12/04/2023     Resume diabetic diet and metformin

## 2023-12-15 NOTE — DISCHARGE SUMMARY
[facility]  Discharge- Macrina Lowe 1950, 75 y.o. male MRN: [id number]  Unit/Bed#: E4 -01 Encounter: [id number]  Primary Care Provider: Azeem Jain DO   Date and time admitted to hospital: 12/12/2023  4:58 PM    * Fall  Assessment & Plan  Multifactorial due to advanced age, history of stroke, deconditioning  CT showed no traumatic injury. Discussed possible rehab placement which he is against.  Fall precautions  DC home with family and Home health    Sepsis due to urinary tract infection   Assessment & Plan  Sepsis POA due to CAUTI  Improving  DC on bactrim x 5 days to complete treatment    Urinary retention  Assessment & Plan  With chronic suprapubic catheter and CAUTI  Routine SPC care    Type 2 diabetes mellitus with hyperglycemia, without long-term current use of insulin [facility])  Assessment & Plan    Lab Results   Component Value Date    HGBA1C 8.1 (H) 12/04/2023     Resume diabetic diet and metformin    Primary hypertension  Assessment & Plan  Continue propranolol and Norvasc       Medical Problems       Resolved Problems  Date Reviewed: 12/15/2023   None       Discharging Physician / Practitioner: Florin Ruth MD  PCP: Azeem Jain DO  Admission Date:   Admission Orders (From admission, onward)       Ordered        12/12/23 2044  INPATIENT ADMISSION  Once                          Discharge Date: 12/15/23    Consultations During Hospital Stay:  None    Procedures Performed:   None    Significant Findings / Test Results:   CT recon only thoracolumbar (No Charge)  Result Date: 12/12/2023  Impression: No fracture or traumatic subluxation. CT chest abdomen pelvis wo contrast  Result Date: 12/12/2023  Impression: No acute traumatic CT findings. Small cluster of tree-in-bud type opacities in the left lower lobe likely infectious/inflammatory in etiology. Probable mild constipation. Mild fecal impaction. Suprapubic catheter.      CT cervical spine without contrast  Result Date: 12/12/2023  Impression: No cervical spine fracture or traumatic malalignment. Moderate cervical spondylosis. CT head without contrast  Result Date: 12/12/2023  Impression: No acute intracranial abnormality. Incidental Findings:   See above     Test Results Pending at Discharge (will require follow up):   none     Outpatient Tests Requested:  none    Complications:  none    Reason for Admission: fall    Hospital Course:   Dee Reese is a 75 y.o. male patient who originally presented to the hospital on 12/12/2023 due to fall while trying to get out of bed. CT scan showed no traumatic injury. He was seen by PT and OT and case management. Patient refused inpatient rehab placement. He will be going home his family with home health. He resides with several siblings. On admission he met sepsis criteria with significant leukocytosis. CT of the chest showed a cluster of tree-in-bud opacities which was read as either infection or inflammation. However the patient did not have any respiratory symptoms and his procalcitonin was negative x 2. So there was low suspicion for pneumonia. Sepsis was felt to be due to urinary tract infection with chronic suprapubic catheter. He was treated with ceftriaxone and will be sent home on the Bactrim for 5 days. His urine culture showed 2 strains of Klebsiella, both sensitive to Bactrim. I reviewed his allergy list (chart and  care everywhere) and he had no allergy to Bactrim    Please see above list of diagnoses and related plan for additional information. Condition at Discharge: good    Discharge Day Visit / Exam:   Subjective: Seen and examined during rounds. Feels back to baseline. Thao Fire to go home.   Vitals: Blood Pressure: 123/59 (12/15/23 0759)  Pulse: 82 (12/15/23 0759)  Temperature: 97.6 °F (36.4 °C) (12/15/23 0821)  Temp Source: Temporal (12/14/23 2340)  Respirations: 18 (12/15/23 0759)  Height: 5' 7" (170.2 cm) (12/12/23 2236)  Weight - Scale: 70.6 kg (155 lb 10.3 oz) (12/12/23 2236)  SpO2: 95 % (12/15/23 0759)  Exam:   Physical Exam  HENT:      Head: Normocephalic and atraumatic. Nose: No congestion or rhinorrhea. Eyes:      General: No scleral icterus. Cardiovascular:      Rate and Rhythm: Regular rhythm. Pulmonary:      Breath sounds: No wheezing or rhonchi. Abdominal:      Tenderness: There is no abdominal tenderness. There is no guarding. Musculoskeletal:      Right lower leg: No edema. Left lower leg: No edema. Skin:     General: Skin is warm and dry. Neurological:      Mental Status: He is alert and oriented to person, place, and time. Psychiatric:         Behavior: Behavior normal.       Discussion with Family: Attempted to update  (brother) via phone. Left voicemail. Discharge instructions/Information to patient and family:   See after visit summary for information provided to patient and family. Provisions for Follow-Up Care:  See after visit summary for information related to follow-up care and any pertinent home health orders. Disposition:   Home with VNA Services (Reminder: Complete face to face encounter)    Planned Readmission: no     Discharge Statement:  I spent >30 minutes discharging the patient. This time was spent on the day of discharge. I had direct contact with the patient on the day of discharge. Greater than 50% of the total time was spent examining patient, answering all patient questions, arranging and discussing plan of care with patient as well as directly providing post-discharge instructions. Additional time then spent on discharge activities. Discharge Medications:  See after visit summary for reconciled discharge medications provided to patient and/or family.       **Please Note: This note may have been constructed using a voice recognition system**

## 2023-12-15 NOTE — ASSESSMENT & PLAN NOTE
Multifactorial due to advanced age, history of stroke, deconditioning  CT showed no traumatic injury.   Discussed possible rehab placement which he is against.  Fall precautions  DC home with family and Home health

## 2023-12-17 LAB
BACTERIA BLD CULT: NORMAL
BACTERIA BLD CULT: NORMAL

## 2023-12-18 NOTE — UTILIZATION REVIEW
NOTIFICATION OF ADMISSION DISCHARGE   This is a Notification of Discharge from [facility]. Please be advised that this patient has been discharge from our facility. Below you will find the admission and discharge date and time including the patient’s disposition.   UTILIZATION REVIEW CONTACT:  Marsha Garcia  Utilization   Network Utilization Review Department  Phone: [phone] x carefully listen to the prompts. All voicemails are confidential.  Email: [email or web address]     ADMISSION INFORMATION  PRESENTATION DATE: 12/12/2023  4:58 PM  OBERVATION ADMISSION DATE:   INPATIENT ADMISSION DATE: 12/12/23  8:44 PM   DISCHARGE DATE: 12/15/2023  3:22 PM   DISPOSITION:Home/Self Care    Network Utilization Review Department  ATTENTION: Please call with any questions or concerns to [phone] and carefully listen to the prompts so that you are directed to the right person. All voicemails are confidential.   For Discharge needs, contact Care Management DC Support Team at [phone] opt. 2  Send all requests for admission clinical reviews, approved or denied determinations and any other requests to dedicated fax number below belonging to the campus where the patient is receiving treatment. List of dedicated fax numbers for the Facilities:  FACILITY NAME UR FAX NUMBER   ADMISSION DENIALS (Administrative/Medical Necessity) [phone]   DISCHARGE SUPPORT TEAM ([facility]) [phone]   PARENT CHILD HEALTH (Maternity/NICU/Pediatrics) [phone]   [facility] [phone]   [facility] [phone]   Critical access hospital [phone]   [facility] [phone]   [facility] [phone]   [facility] [phone]   [facility] [phone]   [facility] [phone]    [facility] [phone]   [facility] [phone]   [facility] [phone]

## 2024-02-12 ENCOUNTER — HOSPITAL ENCOUNTER (EMERGENCY)
Facility: HOSPITAL | Age: 74
Discharge: HOME | DRG: 698 | End: 2024-02-13
Attending: EMERGENCY MEDICINE
Payer: MEDICARE

## 2024-02-12 ENCOUNTER — TELEPHONE (OUTPATIENT)
Dept: OTHER | Facility: HOSPITAL | Age: 74
End: 2024-02-12

## 2024-02-12 DIAGNOSIS — T83.010A SUPRAPUBIC CATHETER DYSFUNCTION, INITIAL ENCOUNTER (HCC): Primary | ICD-10-CM

## 2024-02-12 PROBLEM — N39.0 SEPSIS DUE TO URINARY TRACT INFECTION: Status: RESOLVED | Noted: 2023-12-12 | Resolved: 2024-02-12

## 2024-02-12 PROBLEM — A41.9 SEPSIS DUE TO URINARY TRACT INFECTION: Status: RESOLVED | Noted: 2023-12-12 | Resolved: 2024-02-12

## 2024-02-12 PROCEDURE — 51705 CHANGE OF BLADDER TUBE: CPT | Performed by: UROLOGY

## 2024-02-12 PROCEDURE — 99204 OFFICE O/P NEW MOD 45 MIN: CPT | Performed by: UROLOGY

## 2024-02-12 PROCEDURE — 99284 EMERGENCY DEPT VISIT MOD MDM: CPT

## 2024-02-12 PROCEDURE — 99284 EMERGENCY DEPT VISIT MOD MDM: CPT | Performed by: EMERGENCY MEDICINE

## 2024-02-12 RX ORDER — LIDOCAINE HYDROCHLORIDE 20 MG/ML
1 JELLY TOPICAL ONCE
Status: COMPLETED | OUTPATIENT
Start: 2024-02-12 | End: 2024-02-12

## 2024-02-12 RX ADMIN — LIDOCAINE HYDROCHLORIDE 1 APPLICATION: 20 JELLY TOPICAL at 21:34

## 2024-02-13 VITALS
RESPIRATION RATE: 16 BRPM | TEMPERATURE: 97.9 F | BODY MASS INDEX: 23.82 KG/M2 | SYSTOLIC BLOOD PRESSURE: 120 MMHG | WEIGHT: 152.12 LBS | OXYGEN SATURATION: 97 % | HEART RATE: 88 BPM | DIASTOLIC BLOOD PRESSURE: 68 MMHG

## 2024-02-13 RX ORDER — ACETAMINOPHEN 325 MG/1
650 TABLET ORAL ONCE
Status: COMPLETED | OUTPATIENT
Start: 2024-02-13 | End: 2024-02-13

## 2024-02-13 RX ADMIN — ACETAMINOPHEN 325MG 650 MG: 325 TABLET ORAL at 09:15

## 2024-02-13 NOTE — ED NOTES
Magdiel RN called special delivery to get an update with an ETA for , no answer left message for a call back      Irma Veloz RN  02/13/24 1558

## 2024-02-13 NOTE — TELEPHONE ENCOUNTER
Patient known to [facility] for chronic urinary retention and SPT. Had SPT change today, however, nursing unable to replace catheter. Presented to ED. ED was unable to replace.     I was able to place 16 Fr catheter in SPT site. It is draining well. Patient will be discharged with antibiotics. Patient is due for his 2 year cystoscopy, last one was in January 2022. I recommend next available cystoscopy please. Patient to continue with routine SPT changes with same nursing. They are okay to upsize SPT to 18 Fr if able next change. Thank you    Katherin Khanna PA-C

## 2024-02-13 NOTE — ED PROVIDER NOTES
Final Diagnoses:     1. Suprapubic catheter dysfunction, initial encounter ([facility])        ED Course as of 02/12/24 2347   Mon Feb 12, 202412, 2024 2027 Attempted suprapubic catheter insertion with 20, 18, 16 unsuccessful.   2027 Nurse tried to place a Cage catheter unable to.   2147 Urology to come in     Nursing Triage:     Chief Complaint   Patient presents with    Urinary Catheter Problem     Visiting nurse came to change suprapubic catheter, catheter was removed but a new one could not be reinserted.      HPI:   This is a 73 y.o. male presenting for evaluation of suprapubic catheter dysfunction.   Relevant past medical history suprapubic catheter placement, hypertension, neurogenic bladder.  Patient was having his suprapubic catheter changed at home today when they tried to place the new 20 French catheter they were unable to pass it.  At which point they sent him to the emergency department.  Patient states that he got it placed here back in August.  Patient states that normally I do not have a problem for her.  Patient denies any other systemic symptoms.  He states that he never urinates from his penis.  He denies any current fever, chills, chest pain, shortness of breath, N/V/D, abdominal pain.  ASSESSMENT + PLAN:   Will attempt to replace the suprapubic catheter.  Will consult urology if unable to pass.    -year-old G was able to place a suprapubic catheter.  Patient does not require any emergent and follow-up with IR or urology.  Will follow-up as needed with urology based on his previous schedule.  Physical:   Physical Exam  Vitals and nursing note reviewed.   Constitutional:       Appearance: Normal appearance.   HENT:      Head: Normocephalic and atraumatic.      Nose: Nose normal.      Mouth/Throat:      Mouth: Mucous membranes are moist.      Pharynx: No oropharyngeal exudate or posterior oropharyngeal erythema.   Eyes:      Extraocular Movements: Extraocular movements intact.      Conjunctiva/sclera:  Conjunctivae normal.      Pupils: Pupils are equal, round, and reactive to light.   Cardiovascular:      Rate and Rhythm: Normal rate and regular rhythm.      Pulses: Normal pulses.      Heart sounds: Normal heart sounds.   Pulmonary:      Effort: Pulmonary effort is normal.      Breath sounds: Normal breath sounds.   Abdominal:      General: Abdomen is flat. There is no distension.      Palpations: Abdomen is soft.      Tenderness: There is no abdominal tenderness.      Comments: Suprapubic catheter tract opening with some surrounding erythema no swelling or bloody discharge.   Musculoskeletal:         General: Normal range of motion.      Cervical back: Normal range of motion.   Lymphadenopathy:      Cervical: No cervical adenopathy.   Skin:     General: Skin is warm and dry.      Capillary Refill: Capillary refill takes less than 2 seconds.   Neurological:      General: No focal deficit present.      Mental Status: He is alert and oriented to person, place, and time.      Cranial Nerves: No cranial nerve deficit.      Sensory: No sensory deficit.       ED Triage Vitals [02/12/24 1811]   Temperature Pulse Respirations Blood Pressure SpO2   97.9 °F (36.6 °C) 81 16 150/71 100 %      Temp Source Heart Rate Source Patient Position - Orthostatic VS BP Location FiO2 (%)   Oral Monitor Sitting Right arm --      Pain Score       No Pain         Vitals:    02/12/24 2008 02/12/24 2015 02/12/24 2100 02/12/24 2200   BP: 133/63 133/63 155/68 129/59   TempSrc:       Pulse: 87 80 80    Resp: 18  18    Patient Position - Orthostatic VS: Lying  Lying    Temp:         Lab Results   Component Value Date    POCGLU 127 12/15/2023    POCGLU 106 12/15/2023    POCGLU 140 12/14/2023    POCGLU 104 03/04/2015       - There are no obvious limitations to social determinants of care.   - Nursing note reviewed.   - Vitals reviewed.   - Orders placed by myself.    - Previous chart was reviewed  - No language barrier.   - History obtained from  patient.    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    Past Surgical:    has a past surgical history that includes Suprapubic catheter insertion; Appendectomy; Myringotomy; Eye surgery; Brain surgery; pr litholapaxy smpl/sm <2.5 cm (N/A, 2019); Cystoscopy; Cystoscopy; Cystoscopy (2018); and Cystoscopy (01/15/2021).    Social:     Social History     Substance and Sexual Activity   Alcohol Use Not Currently     Social History     Tobacco Use   Smoking Status Former    Current packs/day: 0.00    Average packs/day: 0.5 packs/day for 31.0 years (15.5 ttl pk-yrs)    Types: Cigarettes    Start date:     Quit date:     Years since quittin.1   Smokeless Tobacco Never     Social History     Substance and Sexual Activity   Drug Use No       Code Status: Prior  Advance Directive and Living Will:      Power of :    POLST:    Medications   lidocaine (URO-JET) 2 % urethral/mucosal gel 1 Application (1 Application Urethral Given 24)     No orders to display     Orders Placed This Encounter   Procedures    Insert urinary catheter    Inpatient consult to Urology     Labs Reviewed - No data to display    Time reflects when diagnosis was documented in both MDM as applicable and the Disposition within this note       Time User Action Codes Description Comment    2024 10:07 PM  Trell Worley Add [T83.010A] Suprapubic catheter dysfunction, initial encounter (HCC)           ED Disposition       ED Disposition   Discharge    Condition   Stable    Date/Time   Mon Feb 12, 2024 10:24 PM    Comment   Mathew Rico discharge to home/self care.                   Follow-up Information    None       Patient's Medications   Discharge Prescriptions    No medications on file     No discharge procedures on file.  Prior to Admission Medications   Prescriptions Last Dose Informant Patient Reported? Taking?   ALPRAZolam (XANAX) 0.25 mg tablet  Self Yes No   Sig: Take 0.25 mg by mouth 2 (two) times a day as needed for anxiety or sleep    Dulaglutide (Trulicity) 0.75 MG/0.5ML SOPN  Self Yes No   Sig: Inject 0.75 mg under the skin once a week   Ergocalciferol (VITAMIN D2 PO)  Self Yes No   Sig: Take 50,000 Units by mouth once a week   albuterol (2.5 mg/3 mL) 0.083 % nebulizer solution   No No   Sig: Take 3 mL (2.5 mg total) by nebulization every 6 (six) hours as needed for wheezing or shortness of breath   albuterol (Ventolin HFA) 90 mcg/act inhaler   No No   Sig: Inhale 2 puffs every 6 (six) hours as needed for wheezing   amLODIPine-atorvastatin (CADUET) 10-80 MG per tablet  Self Yes No   Sig: Take 1 tablet by mouth daily   clopidogrel (PLAVIX) 75 mg tablet  Self No No   Sig: Take 1 tablet (75 mg total) by mouth daily   furosemide (LASIX) 40 mg tablet  Self Yes No   Sig: Take 40 mg by mouth daily   Patient not taking: Reported on 12/14/2023   gabapentin (NEURONTIN) 300 mg capsule  Self No No   Sig: Take 1 capsule (300 mg total) by mouth 2 (two) times a day   gabapentin (NEURONTIN) 300 mg capsule   No No   Sig: Take 2 capsules (600 mg total) by mouth daily at bedtime   latanoprost (XALATAN) 0.005 % ophthalmic solution  Self Yes No   Sig: Administer 1 drop to both eyes daily at bedtime   melatonin 3 mg  Self Yes No   Sig: Take 3 mg by mouth daily at bedtime as needed   pantoprazole (PROTONIX) 40 mg tablet    "No No   Sig: TAKE 1 TABLET TWICE DAILY 30 MINUTES BEFORE BREAKFAST AND DINNER.   polyethylene glycol (MIRALAX) 17 g packet   Yes No   Sig: Take 17 g by mouth daily as needed   potassium chloride (Klor-Con) 10 mEq tablet   Yes No   Sig: Take 10 mEq by mouth 2 (two) times a day   propranolol (INDERAL LA) 60 mg 24 hr capsule   Yes No   Sig: Take 60 mg by mouth daily   senna-docusate sodium (SENOKOT S) 8.6-50 mg per tablet   Yes No   Sig: Take 2 tablets by mouth daily at bedtime   sertraline (ZOLOFT) 25 mg tablet   Yes No   Sig: Take 25 mg by mouth daily at bedtime      Facility-Administered Medications: None                        Portions of the record may have been created with voice recognition software. Occasional wrong word or \"sound a like\" substitutions may have occurred due to the inherent limitations of voice recognition software. Read the chart carefully and recognize, using context, where substitutions have occurred.     Trell Worley MD  02/12/24 9173    "

## 2024-02-13 NOTE — ED ATTENDING ATTESTATION
2/12/2024  I, Jerome Zaldivar MD, saw and evaluated the patient. I have discussed the patient with the resident/non-physician practitioner and agree with the resident's/non-physician practitioner's findings, Plan of Care, and MDM as documented in the resident's/non-physician practitioner's note, except where noted. All available labs and Radiology studies were reviewed.  I was present for key portions of any procedure(s) performed by the resident/non-physician practitioner and I was immediately available to provide assistance.       At this point I agree with the current assessment done in the Emergency Department.  I have conducted an independent evaluation of this patient a history and physical is as follows:  73-year-old male presents for evaluation of suprapubic catheter problem.  Patient states that suprapubic catheter fell out sometime earlier today.  Patient has no complaints.  Cage catheter has been in place for 5 months.  Systems reviewed are significant exam distress, lungs normal, cardiac normal, abdomen suprapubic catheter site without evidence of infection.  MDM;-super catheter problem attempted to replace catheter and were unable to.  Patient will have Cage catheter placed and follow-up with urology as outpatient.  Asymptomatic htn, no medical work up indicated, will follow up with pcp  ED Course         Critical Care Time  Procedures

## 2024-02-13 NOTE — ED NOTES
RN attempted twice to insert urinary catheter without success. Provider made aware.     Yaz Huynh RN  02/12/24 2026

## 2024-02-14 ENCOUNTER — APPOINTMENT (EMERGENCY)
Dept: RADIOLOGY | Facility: HOSPITAL | Age: 74
DRG: 698 | End: 2024-02-14
Payer: MEDICARE

## 2024-02-14 ENCOUNTER — APPOINTMENT (EMERGENCY)
Dept: CT IMAGING | Facility: HOSPITAL | Age: 74
DRG: 698 | End: 2024-02-14
Payer: MEDICARE

## 2024-02-14 ENCOUNTER — HOSPITAL ENCOUNTER (INPATIENT)
Facility: HOSPITAL | Age: 74
LOS: 5 days | Discharge: NURSING, REHAB OR LONG-TERM CARE | DRG: 698 | End: 2024-02-20
Attending: EMERGENCY MEDICINE | Admitting: INTERNAL MEDICINE
Payer: MEDICARE

## 2024-02-14 DIAGNOSIS — K52.89 STERCORAL COLITIS: ICD-10-CM

## 2024-02-14 DIAGNOSIS — N39.0 UTI (URINARY TRACT INFECTION): Primary | ICD-10-CM

## 2024-02-14 LAB
ALBUMIN SERPL BCP-MCNC: 3.8 G/DL (ref 3.5–5)
ALP SERPL-CCNC: 78 U/L (ref 34–104)
ALT SERPL W P-5'-P-CCNC: 14 U/L (ref 7–52)
ANION GAP SERPL CALCULATED.3IONS-SCNC: 8 MMOL/L
AST SERPL W P-5'-P-CCNC: 16 U/L (ref 13–39)
BASOPHILS # BLD AUTO: 0.17 THOUSANDS/ÂΜL (ref 0–0.1)
BASOPHILS NFR BLD AUTO: 1 % (ref 0–1)
BILIRUB SERPL-MCNC: 0.29 MG/DL (ref 0.2–1)
BUN SERPL-MCNC: 13 MG/DL (ref 5–25)
CALCIUM SERPL-MCNC: 10.2 MG/DL (ref 8.4–10.2)
CARDIAC TROPONIN I PNL SERPL HS: 7 NG/L
CHLORIDE SERPL-SCNC: 99 MMOL/L (ref 96–108)
CO2 SERPL-SCNC: 27 MMOL/L (ref 21–32)
CREAT SERPL-MCNC: 0.74 MG/DL (ref 0.6–1.3)
EOSINOPHIL # BLD AUTO: 0.83 THOUSAND/ÂΜL (ref 0–0.61)
EOSINOPHIL NFR BLD AUTO: 6 % (ref 0–6)
ERYTHROCYTE [DISTWIDTH] IN BLOOD BY AUTOMATED COUNT: 15.1 % (ref 11.6–15.1)
GFR SERPL CREATININE-BSD FRML MDRD: 90 ML/MIN/1.73SQ M
GLUCOSE SERPL-MCNC: 122 MG/DL (ref 65–140)
HCT VFR BLD AUTO: 45 % (ref 36.5–49.3)
HGB BLD-MCNC: 15.2 G/DL (ref 12–17)
IMM GRANULOCYTES # BLD AUTO: 0.04 THOUSAND/UL (ref 0–0.2)
IMM GRANULOCYTES NFR BLD AUTO: 0 % (ref 0–2)
LYMPHOCYTES # BLD AUTO: 4.22 THOUSANDS/ÂΜL (ref 0.6–4.47)
LYMPHOCYTES NFR BLD AUTO: 32 % (ref 14–44)
MCH RBC QN AUTO: 29.9 PG (ref 26.8–34.3)
MCHC RBC AUTO-ENTMCNC: 33.8 G/DL (ref 31.4–37.4)
MCV RBC AUTO: 88 FL (ref 82–98)
MONOCYTES # BLD AUTO: 1.03 THOUSAND/ÂΜL (ref 0.17–1.22)
MONOCYTES NFR BLD AUTO: 8 % (ref 4–12)
NEUTROPHILS # BLD AUTO: 7.04 THOUSANDS/ÂΜL (ref 1.85–7.62)
NEUTS SEG NFR BLD AUTO: 53 % (ref 43–75)
NRBC BLD AUTO-RTO: 0 /100 WBCS
PLATELET # BLD AUTO: 350 THOUSANDS/UL (ref 149–390)
PMV BLD AUTO: 8.4 FL (ref 8.9–12.7)
POTASSIUM SERPL-SCNC: 4 MMOL/L (ref 3.5–5.3)
PROT SERPL-MCNC: 7.1 G/DL (ref 6.4–8.4)
RBC # BLD AUTO: 5.09 MILLION/UL (ref 3.88–5.62)
SODIUM SERPL-SCNC: 134 MMOL/L (ref 135–147)
WBC # BLD AUTO: 13.33 THOUSAND/UL (ref 4.31–10.16)

## 2024-02-14 PROCEDURE — 93005 ELECTROCARDIOGRAM TRACING: CPT

## 2024-02-14 PROCEDURE — 84145 PROCALCITONIN (PCT): CPT

## 2024-02-14 PROCEDURE — 96374 THER/PROPH/DIAG INJ IV PUSH: CPT

## 2024-02-14 PROCEDURE — 99285 EMERGENCY DEPT VISIT HI MDM: CPT

## 2024-02-14 PROCEDURE — 71045 X-RAY EXAM CHEST 1 VIEW: CPT

## 2024-02-14 PROCEDURE — 36415 COLL VENOUS BLD VENIPUNCTURE: CPT

## 2024-02-14 PROCEDURE — 80053 COMPREHEN METABOLIC PANEL: CPT

## 2024-02-14 PROCEDURE — 85025 COMPLETE CBC W/AUTO DIFF WBC: CPT

## 2024-02-14 PROCEDURE — 84484 ASSAY OF TROPONIN QUANT: CPT

## 2024-02-14 PROCEDURE — 87086 URINE CULTURE/COLONY COUNT: CPT

## 2024-02-14 PROCEDURE — G1004 CDSM NDSC: HCPCS

## 2024-02-14 PROCEDURE — 96375 TX/PRO/DX INJ NEW DRUG ADDON: CPT

## 2024-02-14 PROCEDURE — 87077 CULTURE AEROBIC IDENTIFY: CPT

## 2024-02-14 PROCEDURE — 74177 CT ABD & PELVIS W/CONTRAST: CPT

## 2024-02-14 PROCEDURE — 81001 URINALYSIS AUTO W/SCOPE: CPT

## 2024-02-14 PROCEDURE — 87186 SC STD MICRODIL/AGAR DIL: CPT

## 2024-02-14 RX ORDER — ONDANSETRON 2 MG/ML
4 INJECTION INTRAMUSCULAR; INTRAVENOUS ONCE
Status: COMPLETED | OUTPATIENT
Start: 2024-02-14 | End: 2024-02-14

## 2024-02-14 RX ORDER — KETOROLAC TROMETHAMINE 30 MG/ML
15 INJECTION, SOLUTION INTRAMUSCULAR; INTRAVENOUS ONCE
Status: COMPLETED | OUTPATIENT
Start: 2024-02-14 | End: 2024-02-14

## 2024-02-14 RX ADMIN — ONDANSETRON 4 MG: 2 INJECTION INTRAMUSCULAR; INTRAVENOUS at 23:17

## 2024-02-14 RX ADMIN — KETOROLAC TROMETHAMINE 15 MG: 30 INJECTION, SOLUTION INTRAMUSCULAR; INTRAVENOUS at 23:17

## 2024-02-14 NOTE — TELEPHONE ENCOUNTER
Called and confirmed time date and location for pt cysto. Pt seemed very confused on the line and he did not know where he gets his SPT changed and does not know what month we are in. From what I can tell pt has VN for his SPT

## 2024-02-15 PROBLEM — T83.511A URINARY TRACT INFECTION ASSOCIATED WITH INDWELLING URETHRAL CATHETER: Status: ACTIVE | Noted: 2024-02-15

## 2024-02-15 PROBLEM — K52.89 STERCORAL COLITIS: Status: ACTIVE | Noted: 2024-02-15

## 2024-02-15 PROBLEM — Z91.89 AT RISK FOR UTI RELATED TO INDWELLING CATHETER: Status: RESOLVED | Noted: 2024-02-15 | Resolved: 2024-02-15

## 2024-02-15 PROBLEM — N39.0 URINARY TRACT INFECTION ASSOCIATED WITH INDWELLING URETHRAL CATHETER: Status: ACTIVE | Noted: 2024-02-15

## 2024-02-15 PROBLEM — Z91.89 AT RISK FOR UTI RELATED TO INDWELLING CATHETER: Status: ACTIVE | Noted: 2024-02-15

## 2024-02-15 LAB
ATRIAL RATE: 108 BPM
BACTERIA UR QL AUTO: ABNORMAL /HPF
BILIRUB UR QL STRIP: NEGATIVE
CLARITY UR: CLEAR
COLOR UR: ABNORMAL
GLUCOSE SERPL-MCNC: 112 MG/DL (ref 65–140)
GLUCOSE SERPL-MCNC: 146 MG/DL (ref 65–140)
GLUCOSE SERPL-MCNC: 89 MG/DL (ref 65–140)
GLUCOSE UR STRIP-MCNC: ABNORMAL MG/DL
HGB UR QL STRIP.AUTO: ABNORMAL
KETONES UR STRIP-MCNC: NEGATIVE MG/DL
LACTATE SERPL-SCNC: 1.9 MMOL/L (ref 0.5–2)
LEUKOCYTE ESTERASE UR QL STRIP: ABNORMAL
NITRITE UR QL STRIP: NEGATIVE
NON-SQ EPI CELLS URNS QL MICRO: ABNORMAL /HPF
OTHER STN SPEC: ABNORMAL
P AXIS: 60 DEGREES
PH UR STRIP.AUTO: 6 [PH]
PR INTERVAL: 160 MS
PROCALCITONIN SERPL-MCNC: 0.06 NG/ML
PROT UR STRIP-MCNC: NEGATIVE MG/DL
QRS AXIS: 71 DEGREES
QRSD INTERVAL: 86 MS
QT INTERVAL: 352 MS
QTC INTERVAL: 471 MS
RBC #/AREA URNS AUTO: ABNORMAL /HPF
SP GR UR STRIP.AUTO: 1.01 (ref 1–1.03)
T WAVE AXIS: 63 DEGREES
UROBILINOGEN UR STRIP-ACNC: <2 MG/DL
VENTRICULAR RATE: 108 BPM
WBC #/AREA URNS AUTO: ABNORMAL /HPF

## 2024-02-15 PROCEDURE — 93010 ELECTROCARDIOGRAM REPORT: CPT | Performed by: STUDENT IN AN ORGANIZED HEALTH CARE EDUCATION/TRAINING PROGRAM

## 2024-02-15 PROCEDURE — 99223 1ST HOSP IP/OBS HIGH 75: CPT | Performed by: INTERNAL MEDICINE

## 2024-02-15 PROCEDURE — 87040 BLOOD CULTURE FOR BACTERIA: CPT

## 2024-02-15 PROCEDURE — 82948 REAGENT STRIP/BLOOD GLUCOSE: CPT

## 2024-02-15 PROCEDURE — 83605 ASSAY OF LACTIC ACID: CPT

## 2024-02-15 PROCEDURE — 92610 EVALUATE SWALLOWING FUNCTION: CPT

## 2024-02-15 PROCEDURE — 36415 COLL VENOUS BLD VENIPUNCTURE: CPT

## 2024-02-15 RX ORDER — PANTOPRAZOLE SODIUM 40 MG/1
40 TABLET, DELAYED RELEASE ORAL
Status: DISCONTINUED | OUTPATIENT
Start: 2024-02-15 | End: 2024-02-20 | Stop reason: HOSPADM

## 2024-02-15 RX ORDER — CEFTRIAXONE 1 G/50ML
1000 INJECTION, SOLUTION INTRAVENOUS EVERY 24 HOURS
Status: DISCONTINUED | OUTPATIENT
Start: 2024-02-16 | End: 2024-02-19

## 2024-02-15 RX ORDER — POLYETHYLENE GLYCOL 3350 17 G/17G
17 POWDER, FOR SOLUTION ORAL DAILY
Status: DISCONTINUED | OUTPATIENT
Start: 2024-02-15 | End: 2024-02-20 | Stop reason: HOSPADM

## 2024-02-15 RX ORDER — ALPRAZOLAM 0.25 MG/1
0.25 TABLET ORAL 2 TIMES DAILY PRN
Status: DISCONTINUED | OUTPATIENT
Start: 2024-02-15 | End: 2024-02-20 | Stop reason: HOSPADM

## 2024-02-15 RX ORDER — ATORVASTATIN CALCIUM 80 MG/1
80 TABLET, FILM COATED ORAL DAILY
Status: DISCONTINUED | OUTPATIENT
Start: 2024-02-15 | End: 2024-02-20 | Stop reason: HOSPADM

## 2024-02-15 RX ORDER — ALBUTEROL SULFATE 2.5 MG/3ML
2.5 SOLUTION RESPIRATORY (INHALATION) EVERY 6 HOURS PRN
Status: DISCONTINUED | OUTPATIENT
Start: 2024-02-15 | End: 2024-02-20 | Stop reason: HOSPADM

## 2024-02-15 RX ORDER — PROPRANOLOL HCL 60 MG
60 CAPSULE, EXTENDED RELEASE 24HR ORAL DAILY
Status: DISCONTINUED | OUTPATIENT
Start: 2024-02-15 | End: 2024-02-20 | Stop reason: HOSPADM

## 2024-02-15 RX ORDER — GABAPENTIN 300 MG/1
600 CAPSULE ORAL
Status: DISCONTINUED | OUTPATIENT
Start: 2024-02-15 | End: 2024-02-20 | Stop reason: HOSPADM

## 2024-02-15 RX ORDER — SERTRALINE HYDROCHLORIDE 25 MG/1
25 TABLET, FILM COATED ORAL
Status: DISCONTINUED | OUTPATIENT
Start: 2024-02-15 | End: 2024-02-20 | Stop reason: HOSPADM

## 2024-02-15 RX ORDER — NYSTATIN 100000 [USP'U]/G
POWDER TOPICAL 2 TIMES DAILY
Status: DISCONTINUED | OUTPATIENT
Start: 2024-02-15 | End: 2024-02-20 | Stop reason: HOSPADM

## 2024-02-15 RX ORDER — LATANOPROST 50 UG/ML
1 SOLUTION/ DROPS OPHTHALMIC
Status: DISCONTINUED | OUTPATIENT
Start: 2024-02-15 | End: 2024-02-20 | Stop reason: HOSPADM

## 2024-02-15 RX ORDER — LANOLIN ALCOHOL/MO/W.PET/CERES
3 CREAM (GRAM) TOPICAL
Status: DISCONTINUED | OUTPATIENT
Start: 2024-02-15 | End: 2024-02-20 | Stop reason: HOSPADM

## 2024-02-15 RX ORDER — INSULIN LISPRO 100 [IU]/ML
1-5 INJECTION, SOLUTION INTRAVENOUS; SUBCUTANEOUS
Status: DISCONTINUED | OUTPATIENT
Start: 2024-02-15 | End: 2024-02-20 | Stop reason: HOSPADM

## 2024-02-15 RX ORDER — AMOXICILLIN 250 MG
2 CAPSULE ORAL
Status: DISCONTINUED | OUTPATIENT
Start: 2024-02-15 | End: 2024-02-20 | Stop reason: HOSPADM

## 2024-02-15 RX ORDER — HEPARIN SODIUM 5000 [USP'U]/ML
5000 INJECTION, SOLUTION INTRAVENOUS; SUBCUTANEOUS EVERY 8 HOURS SCHEDULED
Status: DISCONTINUED | OUTPATIENT
Start: 2024-02-15 | End: 2024-02-20 | Stop reason: HOSPADM

## 2024-02-15 RX ORDER — CEFTRIAXONE 2 G/50ML
2000 INJECTION, SOLUTION INTRAVENOUS ONCE
Status: COMPLETED | OUTPATIENT
Start: 2024-02-15 | End: 2024-02-15

## 2024-02-15 RX ORDER — ACETAMINOPHEN 325 MG/1
650 TABLET ORAL EVERY 6 HOURS PRN
Status: DISCONTINUED | OUTPATIENT
Start: 2024-02-15 | End: 2024-02-20 | Stop reason: HOSPADM

## 2024-02-15 RX ORDER — ONDANSETRON 2 MG/ML
4 INJECTION INTRAMUSCULAR; INTRAVENOUS EVERY 6 HOURS PRN
Status: DISCONTINUED | OUTPATIENT
Start: 2024-02-15 | End: 2024-02-20 | Stop reason: HOSPADM

## 2024-02-15 RX ORDER — AMLODIPINE BESYLATE 10 MG/1
10 TABLET ORAL DAILY
Status: DISCONTINUED | OUTPATIENT
Start: 2024-02-15 | End: 2024-02-20 | Stop reason: HOSPADM

## 2024-02-15 RX ORDER — CLOPIDOGREL BISULFATE 75 MG/1
75 TABLET ORAL DAILY
Status: DISCONTINUED | OUTPATIENT
Start: 2024-02-15 | End: 2024-02-20 | Stop reason: HOSPADM

## 2024-02-15 RX ORDER — GABAPENTIN 300 MG/1
300 CAPSULE ORAL 2 TIMES DAILY
Status: DISCONTINUED | OUTPATIENT
Start: 2024-02-15 | End: 2024-02-20 | Stop reason: HOSPADM

## 2024-02-15 RX ORDER — POLYETHYLENE GLYCOL 3350 17 G/17G
17 POWDER, FOR SOLUTION ORAL DAILY PRN
Status: DISCONTINUED | OUTPATIENT
Start: 2024-02-15 | End: 2024-02-15

## 2024-02-15 RX ADMIN — LATANOPROST 1 DROP: 50 SOLUTION OPHTHALMIC at 21:43

## 2024-02-15 RX ADMIN — PANTOPRAZOLE SODIUM 40 MG: 40 TABLET, DELAYED RELEASE ORAL at 15:39

## 2024-02-15 RX ADMIN — SENNOSIDES AND DOCUSATE SODIUM 2 TABLET: 8.6; 5 TABLET ORAL at 21:42

## 2024-02-15 RX ADMIN — CEFTRIAXONE 2000 MG: 2 INJECTION, SOLUTION INTRAVENOUS at 01:33

## 2024-02-15 RX ADMIN — ATORVASTATIN CALCIUM 80 MG: 80 TABLET, FILM COATED ORAL at 10:22

## 2024-02-15 RX ADMIN — POLYETHYLENE GLYCOL 3350 17 G: 17 POWDER, FOR SOLUTION ORAL at 15:39

## 2024-02-15 RX ADMIN — CEFTRIAXONE 1000 MG: 1 INJECTION, SOLUTION INTRAVENOUS at 23:14

## 2024-02-15 RX ADMIN — PANTOPRAZOLE SODIUM 40 MG: 40 TABLET, DELAYED RELEASE ORAL at 06:46

## 2024-02-15 RX ADMIN — SERTRALINE HYDROCHLORIDE 25 MG: 25 TABLET ORAL at 21:43

## 2024-02-15 RX ADMIN — HEPARIN SODIUM 5000 UNITS: 5000 INJECTION INTRAVENOUS; SUBCUTANEOUS at 21:43

## 2024-02-15 RX ADMIN — HEPARIN SODIUM 5000 UNITS: 5000 INJECTION INTRAVENOUS; SUBCUTANEOUS at 15:39

## 2024-02-15 RX ADMIN — CLOPIDOGREL BISULFATE 75 MG: 75 TABLET ORAL at 10:21

## 2024-02-15 RX ADMIN — GABAPENTIN 600 MG: 300 CAPSULE ORAL at 21:42

## 2024-02-15 RX ADMIN — GABAPENTIN 300 MG: 300 CAPSULE ORAL at 10:21

## 2024-02-15 RX ADMIN — PROPRANOLOL HYDROCHLORIDE 60 MG: 60 CAPSULE, EXTENDED RELEASE ORAL at 10:22

## 2024-02-15 RX ADMIN — GABAPENTIN 300 MG: 300 CAPSULE ORAL at 18:53

## 2024-02-15 RX ADMIN — AMLODIPINE BESYLATE 10 MG: 10 TABLET ORAL at 10:22

## 2024-02-15 RX ADMIN — NYSTATIN: 100000 POWDER TOPICAL at 10:22

## 2024-02-15 RX ADMIN — IOHEXOL 100 ML: 350 INJECTION, SOLUTION INTRAVENOUS at 00:12

## 2024-02-15 NOTE — PLAN OF CARE
Problem: PAIN - ADULT  Goal: Verbalizes/displays adequate comfort level or baseline comfort level  Description: Interventions:  - Encourage patient to monitor pain and request assistance  - Assess pain using appropriate pain scale  - Administer analgesics based on type and severity of pain and evaluate response  - Implement non-pharmacological measures as appropriate and evaluate response  - Consider cultural and social influences on pain and pain management  - Notify physician/advanced practitioner if interventions unsuccessful or patient reports new pain  Outcome: Progressing     Problem: INFECTION - ADULT  Goal: Absence or prevention of progression during hospitalization  Description: INTERVENTIONS:  - Assess and monitor for signs and symptoms of infection  - Monitor lab/diagnostic results  - Monitor all insertion sites, i.e. indwelling lines, tubes, and drains  - Monitor endotracheal if appropriate and nasal secretions for changes in amount and color  - Thicket appropriate cooling/warming therapies per order  - Administer medications as ordered  - Instruct and encourage patient and family to use good hand hygiene technique  - Identify and instruct in appropriate isolation precautions for identified infection/condition  Outcome: Progressing     Problem: Knowledge Deficit  Goal: Patient/family/caregiver demonstrates understanding of disease process, treatment plan, medications, and discharge instructions  Description: Complete learning assessment and assess knowledge base.  Interventions:  - Provide teaching at level of understanding  - Provide teaching via preferred learning methods  Outcome: Progressing

## 2024-02-15 NOTE — ASSESSMENT & PLAN NOTE
Wt Readings from Last 3 Encounters:   02/16/24 66.2 kg (145 lb 15.1 oz)   02/12/24 69 kg (152 lb 1.9 oz)   12/12/23 70.6 kg (155 lb 10.3 oz)       Appears compensated. , monitor I/o and weights

## 2024-02-15 NOTE — ED NOTES
Pt arrived via EMS with suprapubic catheter capped. Tubing and drainage bag not with pt so RN attached new tubing with drainage bag. Catheter draining normally at this time.      Yaz Huynh RN  02/15/24 0108

## 2024-02-15 NOTE — CASE MANAGEMENT
Case Management Assessment & Discharge Planning Note    Patient name Mathew Rico  Location [facility] /[facility] -* MRN [id number]  : 1950 Date 2/15/2024       Current Admission Date: 2024  Current Admission Diagnosis:Stercoral colitis   Patient Active Problem List    Diagnosis Date Noted    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    Urinary tract infection associated with indwelling urethral catheter  02/15/2024    Fall 2023    Weakness 2023    Accidental fall from chair 2023    Constipation 2023    Chronic diastolic congestive heart failure (HCC) 2023    Hyponatremia 2023    Excessive daytime sleepiness 2022    Shortness of breath 2022    Pancreatic mass 2020    Pancreatic lesion 2020    Bladder stones 2019    Bladder neck contracture 2019    History of CVA (cerebrovascular accident) 10/21/2018    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HCC) 2017    Diabetic neuropathy (HCC) 2016    Chronic suprapubic catheter (HCC) 2016    Thyroid nodule 2015    Cervical spinal stenosis 2014    Generalized anxiety disorder 2014    Urinary retention 2014    Obstructive sleep apnea 2013    Benign colon polyp 2012    Esophageal reflux 2012    Fatty liver 2012    Glaucoma 2012    Hyperlipidemia 2012    Multiple sclerosis (HCC) 2012    Type 2 diabetes mellitus with hyperglycemia, without long-term current use of insulin (HCC) 2012    Primary hypertension 2012      LOS (days): 0  Geometric Mean LOS (GMLOS) (days): 3.3  Days to GMLOS:2.8     OBJECTIVE:    Risk of Unplanned Readmission Score: 21.74         Current admission status: Inpatient       Preferred Pharmacy:   [id number] - [facility] [place] - [id number] [facility] [place] [address]  Phone: [phone] Fax: [phone]    [place], [place] -  [address]  [facility] [address]  Phone: [phone] Fax: [phone]    [facility] [place] - [address] [facility],  [address],  [address] [place]  [place] [address]  Phone: [phone] Fax: [phone]    Primary Care Provider: Steve Dickerson DO    Primary Insurance: [facility]  Secondary Insurance:     ASSESSMENT:  Active Health Care Proxies       Guzman Rico Health Care Representative - Brother   Primary Phone: [phone] (Home)                 Advance Directives  Primary Contact: brother Guzman [phone] and sister Sully [phone]    Readmission Root Cause  30 Day Readmission: No    Patient Information  Admitted from:: Home  Mental Status: Confused, Alert (disoriented to situation)  During Assessment patient was accompanied by: Not accompanied during assessment  Assessment information provided by:: Brother  Primary Caregiver: Family  Caregiver's Name:: brother Guzman and sister Sully [phone]  Caregiver's Relationship to Patient:: Family Member  Caregiver's Telephone Number:: [phone]  Support Systems: Family members, Other (Comment) ([facility] aide)  County of Residence: [place]  [place] [facility] do you live in?: [place]  Home entry access options. Select all that apply.: Stairs  Number of steps to enter home.: 1  Type of Current Residence: [facility]  Living Arrangements: Lives w/ Extended Family (w/ sister and brother)    Activities of Daily Living Prior to Admission  Functional Status: Total dependent (can feed himself per brother Guzman)  Completes ADLs independently?: No  Level of ADL dependence: Total Dependent  Ambulates independently?: No  Level of ambulatory dependence: Total Dependent  Does patient use assisted devices?: Yes  Assisted Devices (DME) used: Wheelchair  Does patient currently own DME?: Yes  What DME does the patient currently own?: Wheelchair  Does patient have a history of Outpatient Therapy (PT/OT)?: No  Does the  patient have a history of Short-Term Rehab?: No  Does patient have a history of HHC?: Yes  Does patient currently have HHC?: Yes (Home health aide with [facility])    Current Home Health Care  Type of Current Home Care Services: Home health aide ([facility] aide)  Current Home Health Agency:: Other (please enter name in comment) ([facility])  Current Home Health Follow-Up Provider:: PCP    Patient Information Continued  Income Source: SSI/SSD  Does patient have prescription coverage?: Yes  Does patient receive dialysis treatments?: No  Does patient have a history of substance abuse?: No  Does patient have a history of Mental Health Diagnosis?: Yes (anxiety)  Has patient received inpatient treatment related to mental health in the last 2 years?: No    Means of Transportation  Means of Transport to Appts:: Other (Comment) ([facility])      Social Determinants of Health (SDOH)      Flowsheet Row Most Recent Value   Housing Stability    In the last 12 months, was there a time when you were not able to pay the mortgage or rent on time? N   In the last 12 months, was there a time when you did not have a steady place to sleep or slept in a shelter (including now)? N   Transportation Needs    In the past 12 months, has lack of transportation kept you from medical appointments or from getting medications? no   In the past 12 months, has lack of transportation kept you from meetings, work, or from getting things needed for daily living? No   Food Insecurity    Within the past 12 months, you worried that your food would run out before you got the money to buy more. Never true   Within the past 12 months, the food you bought just didn't last and you didn't have money to get more. Never true   Utilities    In the past 12 months has the electric, gas, oil, or water company threatened to shut off services in your home? No            DISCHARGE DETAILS:    Discharge planning discussed with:: patient's brother  Guzman PÉREZ contacted family/caregiver?: Yes     Contacts  Patient Contacts: Guzman Rico (Brother) [phone] at bedside  Relationship to Patient:: Family  Contact Method: Phone  Phone Number: Guzman Rico (Brother) [phone] at bedside  Reason/Outcome: Continuity of Care, Emergency Contact, Discharge Planning    Additional Comments: Patient lives at home w/ his brother and sister Jose. Patient uses a wheel chair is dependent but can feed himself per patient's brother. Patient gets a [facility] ehealth aide w/ [employer]. CM left voicemail for [employer]  Leslee Soler to inquire what services he actually has, CM awaiting a call back. CM will continue to follow for discharge.    [employer] phone number [phone]

## 2024-02-15 NOTE — ED NOTES
Pt brother in law called to inquire status of pt. Pt states his brother in law may be updated and is in pt contacts. Brother in law voiced concerns of pt living at home with his brother and sister, stating they also have jyothi problems that impact their ability to take care of pt. RN informed the brother in law that a case management consult can be put in to follow up with [facility] for pt. Brother in law requested case management consult. Merly Peacock PA-C made aware.      Yaz Huynh RN  02/15/24 0102

## 2024-02-15 NOTE — ASSESSMENT & PLAN NOTE
Wt Readings from Last 3 Encounters:   02/15/24 66.1 kg (145 lb 11.7 oz)   02/12/24 69 kg (152 lb 1.9 oz)   12/12/23 70.6 kg (155 lb 10.3 oz)       Appears compensated. , monitor I/o and weights

## 2024-02-15 NOTE — ED PROVIDER NOTES
History  Chief Complaint   Patient presents with    Medical Problem     Pt arrives via EMS from home with c/o R lower quadrant pain. Pt was here Monday for Suprapubic catheter replacement. Pt now reports chest pressure that has been going on for two weeks.     Patient is a 73 male with a history of MS, neurogenic bladder, chronic suprapubic catheter presenting for evaluation of lower abdominal pain.  Patient is a poor historian.  He was seen in the ED 2 days ago for suprapubic catheter dysfunction that required urology to come in and place a new 1.  He states that his abdominal pain started prior to his ED visit.  He reports pain is most significant in the left lower quadrant though he did tell the nurse that it was his right lower quadrant that hurt.  He denies associated diarrhea or constipation.  He did have 1 episode of vomiting today.  He also was complaining of chest pressure for 2 weeks to the nurse however he denies this to me.  He denies shortness of breath, palpitations, leg swelling.  No fevers or chills.  No URI symptoms.  No medications prior to arrival.       Medical Problem  Associated symptoms: abdominal pain, chest pain and vomiting    Associated symptoms: no cough, no diarrhea, no ear pain, no fever, no headaches, no nausea, no rash, no shortness of breath and no sore throat        Prior to Admission Medications   Prescriptions Last Dose Informant Patient Reported? Taking?   ALPRAZolam (XANAX) 0.25 mg tablet  Self Yes No   Sig: Take 0.25 mg by mouth 2 (two) times a day as needed for anxiety or sleep    Dulaglutide (Trulicity) 0.75 MG/0.5ML SOPN  Self Yes No   Sig: Inject 0.75 mg under the skin once a week   Ergocalciferol (VITAMIN D2 PO)  Self Yes No   Sig: Take 50,000 Units by mouth once a week   albuterol (2.5 mg/3 mL) 0.083 % nebulizer solution   No No   Sig: Take 3 mL (2.5 mg total) by nebulization every 6 (six) hours as needed for wheezing or shortness of breath   albuterol (Ventolin HFA) 90  mcg/act inhaler   No No   Sig: Inhale 2 puffs every 6 (six) hours as needed for wheezing   amLODIPine-atorvastatin (CADUET) 10-80 MG per tablet  Self Yes No   Sig: Take 1 tablet by mouth daily   clopidogrel (PLAVIX) 75 mg tablet  Self No No   Sig: Take 1 tablet (75 mg total) by mouth daily   furosemide (LASIX) 40 mg tablet  Self Yes No   Sig: Take 40 mg by mouth daily   Patient not taking: Reported on 12/14/2023   gabapentin (NEURONTIN) 300 mg capsule  Self No No   Sig: Take 1 capsule (300 mg total) by mouth 2 (two) times a day   gabapentin (NEURONTIN) 300 mg capsule   No No   Sig: Take 2 capsules (600 mg total) by mouth daily at bedtime   latanoprost (XALATAN) 0.005 % ophthalmic solution  Self Yes No   Sig: Administer 1 drop to both eyes daily at bedtime   melatonin 3 mg  Self Yes No   Sig: Take 3 mg by mouth daily at bedtime as needed   pantoprazole (PROTONIX) 40 mg tablet   No No   Sig: TAKE 1 TABLET TWICE DAILY 30 MINUTES BEFORE BREAKFAST AND DINNER.   polyethylene glycol (MIRALAX) 17 g packet   Yes No   Sig: Take 17 g by mouth daily as needed   potassium chloride (Klor-Con) 10 mEq tablet   Yes No   Sig: Take 10 mEq by mouth 2 (two) times a day   propranolol (INDERAL LA) 60 mg 24 hr capsule   Yes No   Sig: Take 60 mg by mouth daily   senna-docusate sodium (SENOKOT S) 8.6-50 mg per tablet   Yes No   Sig: Take 2 tablets by mouth daily at bedtime   sertraline (ZOLOFT) 25 mg tablet   Yes No   Sig: Take 25 mg by mouth daily at bedtime      Facility-Administered Medications: None       Past Medical History:   Diagnosis Date    Acute laryngitis     Acute nonsuppurative otitis media, unspecified laterality     Arm weakness     Arthritis     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HCC)     Bladder infection     Bronchitis     Constipation     Cough     Diabetes mellitus (HCC)     Dizziness     Dysfunction of eustachian tube     Erectile dysfunction of non-organic origin     Fatigue     Glaucoma     Hiatal hernia     Hypertension      Imbalance     Leg muscle spasm     MS (multiple sclerosis) ([facility])     Nephrolithiasis     Neurogenic bladder     No natural teeth     Sinus pain     Spinal stenosis     Strain of thoracic region     Stroke ([facility])     Suprapubic catheter ([facility])        Past Surgical History:   Procedure Laterality Date    APPENDECTOMY      BRAIN SURGERY      Coil placed in aneurysm    CYSTOSCOPY      CYSTOSCOPY      CYSTOSCOPY  2018    CYSTOSCOPY  01/15/2021    EYE SURGERY      transscleral cyclophotocoagulation noncontact YAG laser    MYRINGOTOMY      with ventilation tube insertion    MO LITHOLAPAXY SMPL/SM <2.5 CM N/A 2019    Procedure: CYSTOSCOPY, holmium laser litholapaxy of bladder stones, EXCHANGE OF SP TUBE;  Surgeon: Javy Jeffries MD;  [facility]: [facility];  Service: Urology    SUPRAPUBIC CATHETER INSERTION         Family History   Problem Relation Age of Onset    Heart attack Mother     Stroke Mother     Heart attack Father     Anuerysm Father         In Stomach      I have reviewed and agree with the history as documented.    E-Cigarette/Vaping    E-Cigarette Use Never User      E-Cigarette/Vaping Substances    Nicotine No     THC No     CBD No     Flavoring No     Other No     Unknown No      Social History     Tobacco Use    Smoking status: Former     Current packs/day: 0.00     Average packs/day: 0.5 packs/day for 31.0 years (15.5 ttl pk-yrs)     Types: Cigarettes     Start date:      Quit date:      Years since quittin.1    Smokeless tobacco: Never   Vaping Use    Vaping status: Never Used   Substance Use Topics    Alcohol use: Not Currently    Drug use: No       Review of Systems   Constitutional:  Negative for chills and fever.   HENT:  Negative for ear pain and sore throat.    Eyes:  Negative for pain and visual disturbance.   Respiratory:  Negative for cough, chest tightness and shortness of breath.    Cardiovascular:  Positive for chest pain. Negative for palpitations and leg swelling.    Gastrointestinal:  Positive for abdominal pain and vomiting. Negative for constipation, diarrhea and nausea.   Genitourinary:  Negative for dysuria, flank pain, frequency and hematuria.   Musculoskeletal:  Negative for arthralgias and back pain.   Skin:  Negative for color change and rash.   Neurological:  Negative for dizziness, seizures, syncope and headaches.   All other systems reviewed and are negative.      Physical Exam  Physical Exam  Vitals and nursing note reviewed.   Constitutional:       General: He is not in acute distress.     Appearance: Normal appearance.   HENT:      Head: Normocephalic and atraumatic.      Right Ear: External ear normal.      Left Ear: External ear normal.      Nose: Nose normal.      Mouth/Throat:      Mouth: Mucous membranes are moist.   Eyes:      General: No scleral icterus.        Right eye: No discharge.         Left eye: No discharge.      Extraocular Movements: Extraocular movements intact.      Conjunctiva/sclera: Conjunctivae normal.   Cardiovascular:      Rate and Rhythm: Normal rate and regular rhythm.      Pulses: Normal pulses.      Heart sounds: Normal heart sounds.   Pulmonary:      Effort: Pulmonary effort is normal. No respiratory distress.      Breath sounds: Normal breath sounds.   Abdominal:      Palpations: Abdomen is soft.      Tenderness: There is abdominal tenderness in the right lower quadrant, suprapubic area and left lower quadrant. There is no guarding or rebound.      Comments: Suprapubic catheter in place producing good urine output. No surrounding erythema or drainage.    Genitourinary:     Penis: Normal.    Musculoskeletal:         General: No tenderness, deformity or signs of injury.      Cervical back: Normal range of motion and neck supple.   Skin:     General: Skin is dry.      Coloration: Skin is not jaundiced.      Findings: No erythema or rash.   Neurological:      General: No focal deficit present.      Mental Status: He is alert and  oriented to person, place, and time. Mental status is at baseline.      Motor: No weakness.      Gait: Gait normal.   Psychiatric:         Mood and Affect: Mood normal.         Behavior: Behavior normal.         Thought Content: Thought content normal.         Vital Signs  ED Triage Vitals [02/14/24 2246]   Temperature Pulse Respirations Blood Pressure SpO2   98 °F (36.7 °C) (!) 108 18 136/65 99 %      Temp Source Heart Rate Source Patient Position - Orthostatic VS BP Location FiO2 (%)   Temporal Monitor Lying Right arm --      Pain Score       4           Vitals:    02/14/24 2246   BP: 136/65   Pulse: (!) 108   Patient Position - Orthostatic VS: Lying         Visual Acuity      ED Medications  Medications   ondansetron (ZOFRAN) injection 4 mg (4 mg Intravenous Given 2/14/24 2317)   ketorolac (TORADOL) injection 15 mg (15 mg Intravenous Given 2/14/24 2317)   iohexol (OMNIPAQUE) 350 MG/ML injection (MULTI-DOSE) 100 mL (100 mL Intravenous Given 2/15/24 0012)       Diagnostic Studies  Results Reviewed       Procedure Component Value Units Date/Time    Urine culture [[id number]] Collected: 02/14/24 2316    Lab Status: In process Specimen: Urine, Suprapubic catheter Updated: 02/15/24 0017    UA w Reflex to Microscopic w Reflex to Culture [[id number]]  (Abnormal) Collected: 02/14/24 2316    Lab Status: Final result Specimen: Urine, Suprapubic catheter Updated: 02/15/24 0015     Color, UA Light Yellow     Clarity, UA Clear     Specific Gravity, UA 1.007     pH, UA 6.0     Leukocytes, UA Large     Nitrite, UA Negative     Protein, UA Negative mg/dl      Glucose, UA >=1000 (1%) mg/dl      Ketones, UA Negative mg/dl      Urobilinogen, UA <2.0 mg/dl      Bilirubin, UA Negative     Occult Blood, UA Moderate    Urine Microscopic [[id number]] Collected: 02/14/24 2316    Lab Status: In process Specimen: Urine, Suprapubic catheter Updated: 02/15/24 0015    HS Troponin 0hr (reflex protocol) [[id number]]  (Normal) Collected: 02/14/24  2314    Lab Status: Final result Specimen: Blood from Arm, Left Updated: 02/14/24 2348     hs TnI 0hr 7 ng/L     Comprehensive metabolic panel [[id number]]  (Abnormal) Collected: 02/14/24 2314    Lab Status: Final result Specimen: Blood from Arm, Left Updated: 02/14/24 2342     Sodium 134 mmol/L      Potassium 4.0 mmol/L      Chloride 99 mmol/L      CO2 27 mmol/L      ANION GAP 8 mmol/L      BUN 13 mg/dL      Creatinine 0.74 mg/dL      Glucose 122 mg/dL      Calcium 10.2 mg/dL      AST 16 U/L      ALT 14 U/L      Alkaline Phosphatase 78 U/L      Total Protein 7.1 g/dL      Albumin 3.8 g/dL      Total Bilirubin 0.29 mg/dL      eGFR 90 ml/min/1.73sq m     Narrative:      National Kid[employer] Disease Foundation guidelines for Chronic Kidney Disease (CKD):     Stage 1 with normal or high GFR (GFR > 90 mL/min/1.73 square meters)    Stage 2 Mild CKD (GFR = 60-89 mL/min/1.73 square meters)    Stage 3A Moderate CKD (GFR = 45-59 mL/min/1.73 square meters)    Stage 3B Moderate CKD (GFR = 30-44 mL/min/1.73 square meters)    Stage 4 Severe CKD (GFR = 15-29 mL/min/1.73 square meters)    Stage 5 End Stage CKD (GFR <15 mL/min/1.73 square meters)  Note: GFR calculation is accurate only with a steady state creatinine    CBC and differential [[id number]]  (Abnormal) Collected: 02/14/24 2314    Lab Status: Final result Specimen: Blood from Arm, Left Updated: 02/14/24 2320     WBC 13.33 Thousand/uL      RBC 5.09 Million/uL      Hemoglobin 15.2 g/dL      Hematocrit 45.0 %      MCV 88 fL      MCH 29.9 pg      MCHC 33.8 g/dL      RDW 15.1 %      MPV 8.4 fL      Platelets 350 Thousands/uL      nRBC 0 /100 WBCs      Neutrophils Relative 53 %      Immat GRANS % 0 %      Lymphocytes Relative 32 %      Monocytes Relative 8 %      Eosinophils Relative 6 %      Basophils Relative 1 %      Neutrophils Absolute 7.04 Thousands/µL      Immature Grans Absolute 0.04 Thousand/uL      Lymphocytes Absolute 4.22 Thousands/µL      Monocytes Absolute 1.03  Thousand/µL      Eosinophils Absolute 0.83 Thousand/µL      Basophils Absolute 0.17 Thousands/µL                    XR chest 1 view portable   ED Interpretation by Barb Clement PA-C (02/14 2347)   No acute pathology as interpreted by myself.        CT abdomen pelvis w contrast    (Results Pending)              Procedures  ECG 12 Lead Documentation Only    Date/Time: 2/14/2024 11:12 PM    Performed by: Barb Clement PA-C  Authorized by: Barb Clement PA-C    Indications / Diagnosis:  Chest pressure  ECG reviewed by me, the ED Provider: yes    Patient location:  ED  Previous ECG:     Previous ECG:  Compared to current    Similarity:  No change  Interpretation:     Interpretation: abnormal    Rate:     ECG rate:  108    ECG rate assessment: tachycardic    Rhythm:     Rhythm: sinus tachycardia    Ectopy:     Ectopy: none    QRS:     QRS axis:  Normal    QRS intervals:  Normal  Conduction:     Conduction: normal    ST segments:     ST segments:  Normal  T waves:     T waves: normal             ED Course  ED Course as of 02/15/24 0032   Wed Feb 14, 2024   2301 Good urine output through catheter while in exam room.   2348 hs TnI 0hr: 7  Symptoms ongoing for 2 weeks, low concern for ACS. No further trending indicated.   2349 WBC(!): 13.33  Elevated at baseline. Will hold off on remaining sepsis labs as patient not presenting with infectious symptoms at this time.   2349 Comprehensive metabolic panel(!)  No significant electrolyte abnormalities, ARMANDO.    Thu Feb 15, 2024   0018 UA w Reflex to Microscopic w Reflex to Culture(!)  Large leukocytes and moderate blood, appears baseline for patient. Negative nitrites. Will correlate with CT.             HEART Risk Score      Flowsheet Row Most Recent Value   Heart Score Risk Calculator    History 0 Filed at: 02/14/2024 2312   ECG 0 Filed at: 02/14/2024 2312   Age 2 Filed at: 02/14/2024 2312   Risk Factors 1 Filed at: 02/14/2024 2312   Troponin 0  Filed at: 02/14/2024 2312   HEART Score 3 Filed at: 02/14/2024 2312                       Initial Sepsis Screening       Row Name 02/14/24 2340                Is the patient's history suggestive of a new or worsening infection? No  -MV                  User Key  (r) = Recorded By, (t) = Taken By, (c) = Cosigned By      Initials Name Provider Type    NARAYAN Clement PA-C Physician Assistant                    SBIRT 22yo+      Flowsheet Row Most Recent Value   Initial Alcohol Screen: US AUDIT-C     1. How often do you have a drink containing alcohol? 0 Filed at: 02/14/2024 2245   2. How many drinks containing alcohol do you have on a typical day you are drinking?  0 Filed at: 02/14/2024 2245   3b. FEMALE Any Age, or MALE 65+: How often do you have 4 or more drinks on one occassion? 0 Filed at: 02/14/2024 2245   Audit-C Score 0 Filed at: 02/14/2024 2245   ALPESH: How many times in the past year have you...    Used an illegal drug or used a prescription medication for non-medical reasons? Never Filed at: 02/14/2024 2245                      Medical Decision Making  Patient is a 73-year-old male presenting with lower abdominal pain for the past few days and chest pressure for 2 weeks.  He had a new suprapubic catheter placed 2 days ago.  He is tachycardic on arrival but remaining vital stable.  Physical exam marked for tenderness to the lower abdomen.  Will obtain CBC and CMP to evaluate for leukocytosis, anemia, electrolyte abnormalities, ARMANDO.  Will obtain troponin/EKG to evaluate for ACS given complaint of chest pressure.  Will obtain CT abdomen to evaluate for intra-abdominal pathology.  Will treat symptomatically.    Leukocytosis at 13. Patient has leukocytosis at baseline and does not present with infectious symptoms so will hold off on remaining sepsis labs. Remaining labs unremarkable. Troponin 7 with nonischemic EKG. Low suspicion for ACS given onset of symptoms 2 weeks ago so no further trending  indicated. CXR negative for acute findings as interpreted by myself. UA also at baseline with leukocytes and blood so will correlate with micro and CT findings.    Signed out to Merly Martinez PA-C pending CT scan and dispo. Patient stable at time of signout.     Amount and/or Complexity of Data Reviewed  Labs: ordered. Decision-making details documented in ED Course.  Radiology: ordered and independent interpretation performed.    Risk  Prescription drug management.             Disposition  Final diagnoses:   None     ED Disposition       None          Follow-up Information    None         Patient's Medications   Discharge Prescriptions    No medications on file       No discharge procedures on file.    PDMP Review         Value Time User    PDMP Reviewed  Yes 8/19/2023  1:22 AM [facility]            ED Provider  Electronically Signed by             Barb Clement PA-C  02/15/24 0032

## 2024-02-15 NOTE — SPEECH THERAPY NOTE
Speech Language/Pathology  Speech/Language Pathology  Assessment    Patient Name: Mathew Rico  Today's Date: 2/15/2024     Problem List  Principal Problem:    Stercoral colitis  Active Problems:    Diabetic neuropathy (HCC)    Chronic suprapubic catheter (HCC)    Esophageal reflux    Generalized anxiety disorder    Primary hypertension    Multiple sclerosis (HCC)    Urinary retention    Chronic diastolic congestive heart failure (HCC)    Constipation    Urinary tract infection associated with indwelling urethral catheter     Past Medical History  Past Medical History:   Diagnosis Date    Acute laryngitis     Acute nonsuppurative otitis media, unspecified laterality     Arm weakness     Arthritis     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HCC)     Bladder infection     Bronchitis     Constipation     Cough     Diabetes mellitus (HCC)     Dizziness     Dysfunction of eustachian tube     Erectile dysfunction of non-organic origin     Fatigue     Glaucoma     Hiatal hernia     Hypertension     Imbalance     Leg muscle spasm     MS (multiple sclerosis) (HCC)     Nephrolithiasis     Neurogenic bladder     No natural teeth     Sinus pain     Spinal stenosis     Strain of thoracic region     Stroke (HCC)     Suprapubic catheter (HCC)      Past Surgical History  Past Surgical History:   Procedure Laterality Date    APPENDECTOMY      BRAIN SURGERY      Coil placed in aneurysm    CYSTOSCOPY      CYSTOSCOPY      CYSTOSCOPY  06/11/2018    CYSTOSCOPY  01/15/2021    EYE SURGERY      transscleral cyclophotocoagulation noncontact YAG laser    MYRINGOTOMY      with ventilation tube insertion    NM LITHOLAPAXY SMPL/SM <2.5 CM N/A 5/7/2019    Procedure: CYSTOSCOPY, holmium laser litholapaxy of bladder stones, EXCHANGE OF SP TUBE;  Surgeon: Javy Jeffries MD;  Location: [facility];  Service: Urology    SUPRAPUBIC CATHETER INSERTION            Bedside Swallow Evaluation:    Summary:  Pt known to me from previous eval 12/13/23 at which time he  "was tolerting a regular diet but was c/o large pills getting stuck in his chest. H/o small hiatal hernia. Questioned also reduced peristalsis from MS.  Presented today with denial of any chewing or swallowing issues.  He is currently edentulous and a dentulous by history but manages WFL.  Today he is reporting that large pills bother him \"once in a while\".  He stated he was feeling much better however he was not very hungry. Patient took thin liquids by straw with good oral control.  Prompt transfer and swallow.  No cough or wet vocal quality.  Agreeable to purée and small amounts of solid.  Mastication was mildly prolonged but functional.  He states he takes this time.  Oral stage is prolonged but functional.  No pharyngeal signs and symptoms.  Occasional esophageal complete with large pills \"once in a while\".    Recommendations:  Diet: Regular  Liquid: Thin  Meds: As patient tolerates and desires  Supervision: As needed  Positioning:Upright  Strategies: Reflux precautions.  Make sure patient is fully upright for meals.  Pt to take PO/Meds only when fully alert and upright.   Oral care  Aspiration precautions  Reflux precautions  Eval only, No f/u tx indicated.  If status changes or declines please reconsult.    Consider consult w/:  Rehab  Nutrition    H&P/Admit info/ pertinent provider notes: (PMH noted above)  History       Chief Complaint   Patient presents with    Medical Problem       Pt arrives via EMS from home with c/o R lower quadrant pain. Pt was here Monday for Suprapubic catheter replacement. Pt now reports chest pressure that has been going on for two weeks.     Patient is a 73 male with a history of MS, neurogenic bladder, chronic suprapubic catheter presenting for evaluation of lower abdominal pain.  Patient is a poor historian.  He was seen in the ED 2 days ago for suprapubic catheter dysfunction that required urology to come in and place a new 1.  He states that his abdominal pain started prior to " his ED visit.  He reports pain is most significant in the left lower quadrant though he did tell the nurse that it was his right lower quadrant that hurt.  He denies associated diarrhea or constipation.  He did have 1 episode of vomiting today.  He also was complaining of chest pressure for 2 weeks to the nurse however he denies this to me.  He denies shortness of breath, palpitations, leg swelling.  No fevers or chills.  No URI symptoms.  No medications prior to arrival.       Special Studies:  CT abdomen/pelvis  2/15/24:  Increased stool within the visualized colon. Correlate clinically for constipation. Additionally at the rectal vault there is hard and stool with thickening of the rectal wall suggestive of stercoral colitis.  Suprapubic catheter is in place. There is bladder wall thickening which could be due to nondistention, however correlate clinically and with urinalysis to evaluate for possible cystitis.  CT chest abdomen/pelvis 12/12/23  STOMACH AND BOWEL: Tiny hiatal hernia. No bowel obstruction. Mild to moderate amount of stool seen scattered throughout the colon. Mild constipation not excluded. There is mild fecal distention of the rectum measuring up to 6.3 cm suggestive of mild   fecal impaction.  CXR results from 2/14 pending     Procalcitonin:   0.06 2/14   WBC:   13.33  2/14       Previous MBS:  3/14/19 at [facility].  Regular diet and thin liquids.  (softer selections recommended due to edentulous). The esophagus was not screened during the study.     Patient's goal: None stated    Did the pt report pain?  Now.  Stated he was cold.  Turned up his heat.  He declined an additional blanket  If yes, was nursing notified/was it addressed?  N/A    Reason for consult:  R/o aspiration  Determine safest and least restrictive diet    Precautions:  Contact  Fall    Food Allergies: None   Current Diet: N.p.o. unless passes swallow eval   Premorbid diet: Regular   O2 requirement: None   Social/Prior living Lives with  extended family   Voice/Speech: WNL   Follows commands: Yes   Cognitive status: Alert and pleasant     Oral [facility] exam:  Dentition: Edentulous  Lips (VII): +  Tongue (XII): +  Mandible (V): +  Velum (X): +  Secretion management: + , Mouth slightly dry    Esophageal stage:  H/o GERD  H/o hiatal hernia    Results d/w:  Pt, nursing, AP

## 2024-02-15 NOTE — ASSESSMENT & PLAN NOTE
"Lab Results   Component Value Date    HGBA1C 8.1 (H) 12/04/2023       No results for input(s): \"POCGLU\" in the last 72 hours.    Blood Sugar Average: Last 72 hrs:    Will place on SSI and basil insulin  as needed . On trulicity at home.   "

## 2024-02-15 NOTE — ED CARE HANDOFF
Emergency Department Sign Out Note        Sign out and transfer of care from Barb Clement PA-C. See Separate Emergency Department note.     The patient, Mathew Rico, was evaluated by the previous provider for abdominal pain, and 2 weeks of chest pain.    Workup Completed:  Results Reviewed       Procedure Component Value Units Date/Time    Procalcitonin [[id number]]  (Normal) Collected: 02/14/24 2314    Lab Status: Final result Specimen: Blood from Arm, Left Updated: 02/15/24 0304     Procalcitonin 0.06 ng/ml     Lactic acid, plasma (w/reflex if result > 2.0) [[id number]]  (Normal) Collected: 02/15/24 0131    Lab Status: Final result Specimen: Blood from Arm, Left Updated: 02/15/24 0154     LACTIC ACID 1.9 mmol/L     Narrative:      Result may be elevated if tourniquet was used during collection.    Blood culture [[id number]] Collected: 02/15/24 0131    Lab Status: In process Specimen: Blood from Arm, Left Updated: 02/15/24 0139    Blood culture [[id number]] Collected: 02/15/24 0131    Lab Status: In process Specimen: Blood from Arm, Right Updated: 02/15/24 0139    Urine Microscopic [[id number]]  (Abnormal) Collected: 02/14/24 2316    Lab Status: Final result Specimen: Urine, Suprapubic catheter Updated: 02/15/24 0035     RBC, UA 20-30 /hpf      WBC, UA Innumerable /hpf      Epithelial Cells Occasional /hpf      Bacteria, UA Innumerable /hpf      OTHER OBSERVATIONS Renal Tubule Epithelial Cells Present    Urine culture [[id number]] Collected: 02/14/24 2316    Lab Status: In process Specimen: Urine, Suprapubic catheter Updated: 02/15/24 0017    UA w Reflex to Microscopic w Reflex to Culture [[id number]]  (Abnormal) Collected: 02/14/24 2316    Lab Status: Final result Specimen: Urine, Suprapubic catheter Updated: 02/15/24 0015     Color, UA Light Yellow     Clarity, UA Clear     Specific Gravity, UA 1.007     pH, UA 6.0     Leukocytes, UA Large     Nitrite, UA Negative     Protein, UA Negative mg/dl      Glucose, UA  >=1000 (1%) mg/dl      Ketones, UA Negative mg/dl      Urobilinogen, UA <2.0 mg/dl      Bilirubin, UA Negative     Occult Blood, UA Moderate    HS Troponin 0hr (reflex protocol) [[id number]]  (Normal) Collected: 02/14/24 2314    Lab Status: Final result Specimen: Blood from Arm, Left Updated: 02/14/24 2348     hs TnI 0hr 7 ng/L     Comprehensive metabolic panel [[id number]]  (Abnormal) Collected: 02/14/24 2314    Lab Status: Final result Specimen: Blood from Arm, Left Updated: 02/14/24 2342     Sodium 134 mmol/L      Potassium 4.0 mmol/L      Chloride 99 mmol/L      CO2 27 mmol/L      ANION GAP 8 mmol/L      BUN 13 mg/dL      Creatinine 0.74 mg/dL      Glucose 122 mg/dL      Calcium 10.2 mg/dL      AST 16 U/L      ALT 14 U/L      Alkaline Phosphatase 78 U/L      Total Protein 7.1 g/dL      Albumin 3.8 g/dL      Total Bilirubin 0.29 mg/dL      eGFR 90 ml/min/1.73sq m     Narrative:      [employer] guidelines for Chronic Kidney Disease (CKD):     Stage 1 with normal or high GFR (GFR > 90 mL/min/1.73 square meters)    Stage 2 Mild CKD (GFR = 60-89 mL/min/1.73 square meters)    Stage 3A Moderate CKD (GFR = 45-59 mL/min/1.73 square meters)    Stage 3B Moderate CKD (GFR = 30-44 mL/min/1.73 square meters)    Stage 4 Severe CKD (GFR = 15-29 mL/min/1.73 square meters)    Stage 5 End Stage CKD (GFR <15 mL/min/1.73 square meters)  Note: GFR calculation is accurate only with a steady state creatinine    CBC and differential [[id number]]  (Abnormal) Collected: 02/14/24 2314    Lab Status: Final result Specimen: Blood from Arm, Left Updated: 02/14/24 2320     WBC 13.33 Thousand/uL      RBC 5.09 Million/uL      Hemoglobin 15.2 g/dL      Hematocrit 45.0 %      MCV 88 fL      MCH 29.9 pg      MCHC 33.8 g/dL      RDW 15.1 %      MPV 8.4 fL      Platelets 350 Thousands/uL      nRBC 0 /100 WBCs      Neutrophils Relative 53 %      Immat GRANS % 0 %      Lymphocytes Relative 32 %      Monocytes Relative 8 %       Eosinophils Relative 6 %      Basophils Relative 1 %      Neutrophils Absolute 7.04 Thousands/µL      Immature Grans Absolute 0.04 Thousand/uL      Lymphocytes Absolute 4.22 Thousands/µL      Monocytes Absolute 1.03 Thousand/µL      Eosinophils Absolute 0.83 Thousand/µL      Basophils Absolute 0.17 Thousands/µL             CT abdomen pelvis w contrast    Result Date: 2/15/2024  Narrative: CT ABDOMEN AND PELVIS WITH IV CONTRAST INDICATION: RLQ abdominal pain (Age >= 14y) bl lower abd pain. COMPARISON: 12/12/2023, 9/18/2023 TECHNIQUE: CT examination of the abdomen and pelvis was performed. Multiplanar 2D reformatted images were created from the source data. This examination, like all CT scans performed in the [facility], was performed utilizing techniques to minimize radiation dose exposure, including the use of iterative reconstruction and automated exposure control. Radiation dose length product (DLP) for this visit: 420 mGy-cm IV Contrast: 100 mL of iohexol (OMNIPAQUE) Enteric Contrast: Not administered. FINDINGS: ABDOMEN LOWER CHEST: No infiltrate. No pleural effusion. Subsegmental atelectatic changes versus scarring at the bilateral lower lobes and lingula. LIVER/BILIARY TREE: Unremarkable. GALLBLADDER: No calcified gallstones. No pericholecystic inflammatory change. SPLEEN: Unremarkable. PANCREAS: Unremarkable. ADRENAL GLANDS: Unremarkable. KIDNEYS/URETERS: Stable mild perinephric stranding and cysts of the left kidney. There is no hydronephrosis. STOMACH AND BOWEL: No dilated loops of small bowel to suggest mechanical obstruction. Increased stool within the visualized colon. Correlate clinically for constipation. Additionally at the rectal vault there is hard and stool with thickening of the rectal wall suggestive of stercoral colitis. APPENDIX: No findings to suggest appendicitis. ABDOMINOPELVIC CAVITY: No free air. No large volume ascites. VESSELS: Extensive calcification throughout the  aorta. No aortic aneurysm. No aortic dissection. PELVIS REPRODUCTIVE ORGANS: Unremarkable for patient's age. URINARY BLADDER: Suprapubic catheter is in place. There is bladder wall thickening which could be due to nondistention, however correlate clinically and with urinalysis to evaluate for possible cystitis. ABDOMINAL WALL/INGUINAL REGIONS: Suprapubic catheter. Small bilateral fat-containing inguinal hernias, left greater than right BONES: No acute fracture or suspicious osseous lesion. Degenerative changes of the thoracolumbar spine and bilateral hip joints     Impression: Increased stool within the visualized colon. Correlate clinically for constipation. Additionally at the rectal vault there is hard and stool with thickening of the rectal wall suggestive of stercoral colitis. Suprapubic catheter is in place. There is bladder wall thickening which could be due to nondistention, however correlate clinically and with urinalysis to evaluate for possible cystitis. Workstation performed: [id number]        ED Course / Workup Pending (followup):        HEART Risk Score      Flowsheet Row Most Recent Value   Heart Score Risk Calculator    History 0 Filed at: 02/14/2024 2312   ECG 0 Filed at: 02/14/2024 2312   Age 2 Filed at: 02/14/2024 2312   Risk Factors 1 Filed at: 02/14/2024 2312   Troponin 0 Filed at: 02/14/2024 2312   HEART Score 3 Filed at: 02/14/2024 2312                     Sepsis Note   Mathew Rico 73 y.o. male MRN: [id number]  Unit/Bed#: ED-03 Encounter: [id number]       Initial Sepsis Screening       Row Name 02/15/24 0117 02/14/24 9430             Is the patient's history suggestive of a new or worsening infection? Yes (Proceed)  -JM No  -MV       Suspected source of infection urinary tract infection  -JM --       Indicate SIRS criteria Tachycardia > 90 bpm;Leukocytosis (WBC > 48115 IJL) OR Leukopenia (WBC <4000 IJL) OR Bandemia (WBC >10% bands)  -JM --       Are two or more of the above signs &  symptoms of infection both present and new to the patient? Yes (Proceed)  -ASHLEY --       Assess for evidence of organ dysfunction: Are any of the below criteria present within 6 hours of suspected infection and SIRS criteria that are NOT considered to be chronic conditions? -- --                 User Key  (r) = Recorded By, (t) = Taken By, (c) = Cosigned By      Initials Name Provider Type    ASHLEY Peacock PA-C Physician Assistant    NARAYAN Clement PA-C Physician Assistant                        Body mass index is 22.72 kg/m².  Wt Readings from Last 1 Encounters:   02/14/24 65.8 kg (145 lb 1 oz)     IBW (Ideal Body Weight): 66.1 kg    Ideal body weight: 66.1 kg (145 lb 11.6 oz)      Procedures  Medical Decision Making  Microscopic UA concerning for UTI. CT concerning for stercoral colitis.  Patient's last urine culture revealed infection susceptible to Rocephin.  Given SIRS criteria, will obtain lactic acid, procalcitonin, blood cultures, and start Rocephin.  Findings discussed with patient who is agreeable with plan for admission.    Per RN, patient's brother had called and raised concern for patient's living condition as he lives with his siblings were also elderly.  Case management consult placed    At the time of admission, the patient is in no acute distress. I discussed with the patient the diagnosis, treatment plan, and plan for admission; they were given the opportunity to ask questions and verbalized understanding. They agree with plan.    Problems Addressed:  UTI (urinary tract infection): acute illness or injury    Amount and/or Complexity of Data Reviewed  External Data Reviewed: labs, radiology and notes.  Labs: ordered. Decision-making details documented in ED Course.  Radiology: ordered and independent interpretation performed.    Risk  Prescription drug management.  Decision regarding hospitalization.            Disposition  Final diagnoses:   UTI (urinary tract infection)    Stercoral colitis     Time reflects when diagnosis was documented in both MDM as applicable and the Disposition within this note       Time User Action Codes Description Comment    2/15/2024  1:15 AM Merly Peacock Add [N39.0] UTI (urinary tract infection)     2/15/2024  4:43 AM Merly Peacock [K52.89] Stercoral colitis           ED Disposition       ED Disposition   Admit    Condition   Stable    Date/Time   Thu Feb 15, 2024 0115    Comment   Case was discussed with LISSETTE and the patient's admission status was agreed to be Admission Status: inpatient status to the service of Dr. Luong .               Follow-up Information    None       Current Discharge Medication List        CONTINUE these medications which have NOT CHANGED    Details   albuterol (2.5 mg/3 mL) 0.083 % nebulizer solution Take 3 mL (2.5 mg total) by nebulization every 6 (six) hours as needed for wheezing or shortness of breath  Qty: 60 mL, Refills: 3    Associated Diagnoses: Shortness of breath      albuterol (Ventolin HFA) 90 mcg/act inhaler Inhale 2 puffs every 6 (six) hours as needed for wheezing  Qty: 18 g, Refills: 5    Comments: Substitution to a formulary equivalent within the same pharmaceutical class is authorized.  Associated Diagnoses: Shortness of breath      ALPRAZolam (XANAX) 0.25 mg tablet Take 0.25 mg by mouth 2 (two) times a day as needed for anxiety or sleep       amLODIPine-atorvastatin (CADUET) 10-80 MG per tablet Take 1 tablet by mouth daily      clopidogrel (PLAVIX) 75 mg tablet Take 1 tablet (75 mg total) by mouth daily  Refills: 0    Associated Diagnoses: Chronic suprapubic catheter (HCC); Neurogenic bladder; Cellulitis      Dulaglutide (Trulicity) 0.75 MG/0.5ML SOPN Inject 0.75 mg under the skin once a week      Ergocalciferol (VITAMIN D2 PO) Take 50,000 Units by mouth once a week      furosemide (LASIX) 40 mg tablet Take 40 mg by mouth daily      !! gabapentin (NEURONTIN) 300 mg capsule Take 1 capsule (300 mg  total) by mouth 2 (two) times a day  Qty: 60 capsule, Refills: 0    Associated Diagnoses: Multiple sclerosis (HCC)      !! gabapentin (NEURONTIN) 300 mg capsule Take 2 capsules (600 mg total) by mouth daily at bedtime  Qty: 30 capsule, Refills: 0    Associated Diagnoses: Multiple sclerosis (HCC)      latanoprost (XALATAN) 0.005 % ophthalmic solution Administer 1 drop to both eyes daily at bedtime      melatonin 3 mg Take 3 mg by mouth daily at bedtime as needed      pantoprazole (PROTONIX) 40 mg tablet TAKE 1 TABLET TWICE DAILY 30 MINUTES BEFORE BREAKFAST AND DINNER.  Qty: 180 tablet, Refills: 1    Associated Diagnoses: Gastroesophageal reflux disease without esophagitis      polyethylene glycol (MIRALAX) 17 g packet Take 17 g by mouth daily as needed      potassium chloride (Klor-Con) 10 mEq tablet Take 10 mEq by mouth 2 (two) times a day      propranolol (INDERAL LA) 60 mg 24 hr capsule Take 60 mg by mouth daily      senna-docusate sodium (SENOKOT S) 8.6-50 mg per tablet Take 2 tablets by mouth daily at bedtime      sertraline (ZOLOFT) 25 mg tablet Take 25 mg by mouth daily at bedtime       !! - Potential duplicate medications found. Please discuss with provider.        No discharge procedures on file.       ED Provider  Electronically Signed by     Merly Peacock PA-C  02/15/24 5585

## 2024-02-15 NOTE — QUICK NOTE
"S: States he is \"ok.\" No complaints at this time. Denies chest pain, SOB, N/V/D, fevers, chills. No acute events overnight.     O:  Gen: Awake and alert on exam, no acute distress.   CV: RRR, no MRG  Pulm: clear to auscultation bilaterally. No W/R/R  Abdomen: soft, non tender, non distended.  Skin: pale, warm, dry     A/P:   Stercoral colitis: schedule Miralax. Continue Senokot. Will order enema as well. Up out of bed as tolerated.   Dysphagia: evaluated by speech. Continue regular diet.  UTI in setting of suprapubic cath: Continue rocephin. F/u urine culture. Continue to trend CBC, and BMP daily   PT/OT eval    See H&P for further plan  "

## 2024-02-15 NOTE — ASSESSMENT & PLAN NOTE
Grossly positive UA.  Urine culture >100,000 kleb pneumoniae on 2/12   F/u Urine culture susp.   Continue abx with duration 7 days,   Day 2/7 rocephin

## 2024-02-15 NOTE — ASSESSMENT & PLAN NOTE
CC was for abdomen pain. Ct shows fecal impaction. Pt remarkably asymptomatic.   Initiate miralax, BID   Will try glycernin suppository , may need enemas. Not currently vomiting and not complaining of pain..

## 2024-02-15 NOTE — ASSESSMENT & PLAN NOTE
Hx of Multiple sclerosis with chronic indwelling dela cruz catherer. Continue abx. Try to discover when last changed.

## 2024-02-15 NOTE — ASSESSMENT & PLAN NOTE
Lab Results   Component Value Date    HGBA1C 8.1 (H) 12/04/2023       Recent Labs     02/15/24  1102 02/15/24  1514 02/15/24  2110 02/16/24  0708   POCGLU 89 112 146* 106       Blood Sugar Average: Last 72 hrs:  (P) 113.25  Will place on SSI and basil insulin  as needed . On trulicity at home.

## 2024-02-15 NOTE — ASSESSMENT & PLAN NOTE
CC was for abdomen pain. Ct shows fecal impaction.   Still endorsing abdominal discomfort, but passing gas.   Continue scheduled miralax, senna.   Attempted enema yesterday without success.   Suppository ordered for this morning  If unsuccessful will order glycolax bowel prep

## 2024-02-15 NOTE — ASSESSMENT & PLAN NOTE
Hx of Multiple sclerosis with chronic indwelling dela cruz catherer. Continue abx.   Hospitalized 2/12 presenting with suprapubic cath dysfunction - At home, cath unable to be passed.   Exchanged 2/12 by urology in ED and d/c home

## 2024-02-15 NOTE — H&P
"[facility]  H&P  Name: Mathew Rico 73 y.o. male I MRN: [id number]  Unit/Bed#: E5 -01 I Date of Admission: 2/14/2024   Date of Service: 2/15/2024 I Hospital Day: 0      Assessment/Plan   * Stercoral colitis  Assessment & Plan  CC was for abdomen pain. Ct shows fecal impaction. Pt remarkably asymptomatic.   Initiate miralax, BID   Will try glycernin suppository , may need enemas. Not currently vomiting and not complaining of pain..    Urinary tract infection associated with indwelling urethral catheter   Assessment & Plan  Grossly positive UA. Agree with Rocephin.     Constipation  Assessment & Plan  Miralax, suppository. And may need enema    Chronic diastolic congestive heart failure (HCC)  Assessment & Plan  Wt Readings from Last 3 Encounters:   02/15/24 66.1 kg (145 lb 11.7 oz)   02/12/24 69 kg (152 lb 1.9 oz)   12/12/23 70.6 kg (155 lb 10.3 oz)       Appears compensated. , monitor I/o and weights        Urinary retention  Assessment & Plan  Chronic due to MS. Find out when dela cruz changed.     Multiple sclerosis (HCC)  Assessment & Plan  Continue neurontin    Primary hypertension  Assessment & Plan  Continue lasix and inderal.     Generalized anxiety disorder  Assessment & Plan  As needed xanax, and zoloft . Check PDmP reg benzo    Esophageal reflux  Assessment & Plan  Continue protonix    Chronic suprapubic catheter (HCC)  Assessment & Plan  Hx of Multiple sclerosis with chronic indwelling dela cruz catherer. Continue abx. Try to discover when last changed.     Diabetic neuropathy (HCC)  Assessment & Plan  Lab Results   Component Value Date    HGBA1C 8.1 (H) 12/04/2023       No results for input(s): \"POCGLU\" in the last 72 hours.    Blood Sugar Average: Last 72 hrs:    Will place on SSI and basilar insulin  as needed . On trulicity at home.        VTE Prophylaxis: Heparin (with caution monitor for rectal bleeding) / sequential compression device   Code Status: full code  POLST: " POLST form is not discussed and not completed at this time.  Discussion with family: States brother and mati parker are surrogate. Can call in am.     Anticipated Length of Stay:  Patient will be admitted on an Inpatient basis with an anticipated length of stay of  greater than  2 midnights.   Justification for Hospital Stay: treatment for UTI,   Treatment for constipation  Total Time for Visit, including Counseling / Coordination of Care: 60 minutes.    Chief Complaint:  Abdomen pain    History of Present Illness:    Mathew Rico is a 73 y.o. male with a past medical history significant for multiple sclerosis resulting in neurogenic bladder requiring chronic suprapubic catheter.  Patient presented with abdominal pain and recent exacerbation of his constipation.  He was brought to the emergency room where he was found to have low-grade temperature urine dip confirmed probable urinary tract infection which was present on admission and catheter related.  Will need to follow-up cultures.  Agree with continuing Rocephin.  Patient's past medical history also significant for chronic diastolic congestive heart failure currently compensated.  Will follow I's and O's as well as daily weights..    Review of Systems:    Review of Systems   Constitutional:  Positive for fatigue.   HENT:  Positive for congestion.    Respiratory:  Positive for cough.    Genitourinary: Negative.    Neurological:  Positive for weakness.       Past Medical and Surgical History:     Past Medical History:   Diagnosis Date    Acute laryngitis     Acute nonsuppurative otitis media, unspecified laterality     Arm weakness     Arthritis     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HCC)     Bladder infection     Bronchitis     Constipation     Cough     Diabetes mellitus (HCC)     Dizziness     Dysfunction of eustachian tube     Erectile dysfunction of non-organic origin     Fatigue     Glaucoma     Hiatal hernia     Hypertension     Imbalance     Leg muscle spasm      MS (multiple sclerosis) ([facility])     Nephrolithiasis     Neurogenic bladder     No natural teeth     Sinus pain     Spinal stenosis     Strain of thoracic region     Stroke ([facility])     Suprapubic catheter ([facility])        Past Surgical History:   Procedure Laterality Date    APPENDECTOMY      BRAIN SURGERY      Coil placed in aneurysm    CYSTOSCOPY      CYSTOSCOPY      CYSTOSCOPY  06/11/2018    CYSTOSCOPY  01/15/2021    EYE SURGERY      transscleral cyclophotocoagulation noncontact YAG laser    MYRINGOTOMY      with ventilation tube insertion    GA LITHOLAPAXY SMPL/SM <2.5 CM N/A 5/7/2019    Procedure: CYSTOSCOPY, holmium laser litholapaxy of bladder stones, EXCHANGE OF SP TUBE;  Surgeon: Javy Jeffries MD;  Location: [facility];  Service: Urology    SUPRAPUBIC CATHETER INSERTION         Meds/Allergies:    Prior to Admission medications    Medication Sig Start Date End Date Taking? Authorizing Provider   albuterol (2.5 mg/3 mL) 0.083 % nebulizer solution Take 3 mL (2.5 mg total) by nebulization every 6 (six) hours as needed for wheezing or shortness of breath 10/3/23   Nelson Cabezas MD   albuterol (Ventolin HFA) 90 mcg/act inhaler Inhale 2 puffs every 6 (six) hours as needed for wheezing 10/3/23   Nelson Cabezas MD   ALPRAZolam (XANAX) 0.25 mg tablet Take 0.25 mg by mouth 2 (two) times a day as needed for anxiety or sleep     Historical Provider, MD   amLODIPine-atorvastatin (CADUET) 10-80 MG per tablet Take 1 tablet by mouth daily    Historical Provider, MD   clopidogrel (PLAVIX) 75 mg tablet Take 1 tablet (75 mg total) by mouth daily 10/27/18   Kraig Griffin MD   Dulaglutide (Trulicity) 0.75 MG/0.5ML SOPN Inject 0.75 mg under the skin once a week    Historical Provider, MD   Ergocalciferol (VITAMIN D2 PO) Take 50,000 Units by mouth once a week    Historical Provider, MD   furosemide (LASIX) 40 mg tablet Take 40 mg by mouth daily  Patient not taking: Reported on 12/14/2023    Historical Provider, MD    gabapentin (NEURONTIN) 300 mg capsule Take 1 capsule (300 mg total) by mouth 2 (two) times a day 6/3/21   Phan Vazquez, DO   gabapentin (NEURONTIN) 300 mg capsule Take 2 capsules (600 mg total) by mouth daily at bedtime 6/3/21   Phan Vazquez, DO   latanoprost (XALATAN) 0.005 % ophthalmic solution Administer 1 drop to both eyes daily at bedtime    Historical Provider, MD   melatonin 3 mg Take 3 mg by mouth daily at bedtime as needed    Historical Provider, MD   pantoprazole (PROTONIX) 40 mg tablet TAKE 1 TABLET TWICE DAILY 30 MINUTES BEFORE BREAKFAST AND DINNER. 3/13/18   Antoni Parrish,    polyethylene glycol (MIRALAX) 17 g packet Take 17 g by mouth daily as needed    Historical Provider, MD   potassium chloride (Klor-Con) 10 mEq tablet Take 10 mEq by mouth 2 (two) times a day    Historical Provider, MD   propranolol (INDERAL LA) 60 mg 24 hr capsule Take 60 mg by mouth daily    Historical Provider, MD   senna-docusate sodium (SENOKOT S) 8.6-50 mg per tablet Take 2 tablets by mouth daily at bedtime    Historical Provider, MD   sertraline (ZOLOFT) 25 mg tablet Take 25 mg by mouth daily at bedtime    Historical Provider, MD     I have reviewed home medications with patient personally.    Allergies:   Allergies   Allergen Reactions    Cephalexin Rash       Social History:     Marital Status: Single   Patient Pre-hospital Living Situation: Live with brother and sister  Patient Pre-hospital Level of Mobility: In question, pt reports amblates but his legs have been sore   Patient Pre-hospital Diet Restrictions: None reported but he dose have a wet quality to voice.   Substance Use History:   Social History     Substance and Sexual Activity   Alcohol Use Not Currently     Social History     Tobacco Use   Smoking Status Former    Current packs/day: 0.00    Average packs/day: 0.5 packs/day for 31.0 years (15.5 ttl pk-yrs)    Types: Cigarettes    Start date:     Quit date:     Years since quittin.1  "  Smokeless Tobacco Never     Social History     Substance and Sexual Activity   Drug Use No       Family History:    Family History   Problem Relation Age of Onset    Heart attack Mother     Stroke Mother     Heart attack Father     Anuerysm Father         In Stomach        Physical Exam:     Vitals:   Blood Pressure: 127/64 (02/15/24 0208)  Pulse: 94 (02/15/24 0208)  Temperature: (!) 97.3 °F (36.3 °C) (02/15/24 0208)  Temp Source: Temporal (02/14/24 2246)  Respirations: 16 (02/15/24 0208)  Height: 5' 7\" (170.2 cm) (02/15/24 0208)  Weight - Scale: 66.1 kg (145 lb 11.7 oz) (02/15/24 0208)  SpO2: 98 % (02/15/24 0208)    Physical Exam  Constitutional:       General: He is not in acute distress.     Comments: Sleepy so difficullt to obtain full history.    HENT:      Head: Normocephalic and atraumatic.      Right Ear: External ear normal.      Left Ear: External ear normal.      Nose: Nose normal.      Mouth/Throat:      Mouth: Mucous membranes are dry.      Pharynx: Oropharynx is clear.   Eyes:      Extraocular Movements: Extraocular movements intact.      Conjunctiva/sclera: Conjunctivae normal.      Pupils: Pupils are equal, round, and reactive to light.   Cardiovascular:      Rate and Rhythm: Normal rate and regular rhythm.   Pulmonary:      Effort: Respiratory distress present.      Breath sounds: Rales present.   Neurological:      Cranial Nerves: Cranial nerve deficit present.           Additional Data:     Lab Results: I have personally reviewed pertinent reports.      Results from last 7 days   Lab Units 02/14/24  2314   WBC Thousand/uL 13.33*   HEMOGLOBIN g/dL 15.2   HEMATOCRIT % 45.0   PLATELETS Thousands/uL 350   NEUTROS PCT % 53   LYMPHS PCT % 32   MONOS PCT % 8   EOS PCT % 6     Results from last 7 days   Lab Units 02/14/24  2314   SODIUM mmol/L 134*   POTASSIUM mmol/L 4.0   CHLORIDE mmol/L 99   CO2 mmol/L 27   BUN mg/dL 13   CREATININE mg/dL 0.74   ANION GAP mmol/L 8   CALCIUM mg/dL 10.2   ALBUMIN g/dL " 3.8   TOTAL BILIRUBIN mg/dL 0.29   ALK PHOS U/L 78   ALT U/L 14   AST U/L 16   GLUCOSE RANDOM mg/dL 122                 Results from last 7 days   Lab Units 02/15/24  0131 24  2314   LACTIC ACID mmol/L 1.9  --    PROCALCITONIN ng/ml  --  0.06       Imaging: I have personally reviewed pertinent reports.      CT abdomen pelvis w contrast   Final Result by Jono Whitaker DO (02/15 0051)      Increased stool within the visualized colon. Correlate clinically for constipation. Additionally at the rectal vault there is hard and stool with thickening of the rectal wall suggestive of stercoral colitis.      Suprapubic catheter is in place. There is bladder wall thickening which could be due to nondistention, however correlate clinically and with urinalysis to evaluate for possible cystitis.      Workstation performed: [id number]         XR chest 1 view portable   ED Interpretation by Barb Clement PA-C ( [id number])   No acute pathology as interpreted by myself.            EKG, Pathology, and Other Studies Reviewed on Admission:   EK sinus tachy, non specific lateral changes.     Allscripts / Epic Records Reviewed: Yes     ** Please Note: This note has been constructed using a voice recognition system. **

## 2024-02-15 NOTE — SEPSIS NOTE
Sepsis Note   Mathew Rico 73 y.o. male MRN: [id number]  Unit/Bed#: ED-03 Encounter: [id number]       Initial Sepsis Screening       Row Name 02/15/24 0117 02/14/24 2340             Is the patient's history suggestive of a new or worsening infection? Yes (Proceed)  -ASHLEY No  -MV       Suspected source of infection urinary tract infection  -JM --       Indicate SIRS criteria Tachycardia > 90 bpm;Leukocytosis (WBC > 27769 IJL) OR Leukopenia (WBC <4000 IJL) OR Bandemia (WBC >10% bands)  -JM --       Are two or more of the above signs & symptoms of infection both present and new to the patient? Yes (Proceed)  - --       Assess for evidence of organ dysfunction: Are any of the below criteria present within 6 hours of suspected infection and SIRS criteria that are NOT considered to be chronic conditions? -- --                 User Key  (r) = Recorded By, (t) = Taken By, (c) = Cosigned By      Initials Name Provider Type    ASHLEY Peacock PA-C Physician Assistant    NARAYAN Clement PA-C Physician Assistant                        Body mass index is 22.72 kg/m².  Wt Readings from Last 1 Encounters:   02/14/24 65.8 kg (145 lb 1 oz)     IBW (Ideal Body Weight): 66.1 kg    Ideal body weight: 66.1 kg (145 lb 11.6 oz)

## 2024-02-16 ENCOUNTER — TELEPHONE (OUTPATIENT)
Dept: NEUROLOGY | Facility: CLINIC | Age: 74
End: 2024-02-16

## 2024-02-16 LAB
ANION GAP SERPL CALCULATED.3IONS-SCNC: 8 MMOL/L
BASOPHILS # BLD AUTO: 0.14 THOUSANDS/ÂΜL (ref 0–0.1)
BASOPHILS NFR BLD AUTO: 1 % (ref 0–1)
BUN SERPL-MCNC: 8 MG/DL (ref 5–25)
CALCIUM SERPL-MCNC: 9.9 MG/DL (ref 8.4–10.2)
CHLORIDE SERPL-SCNC: 103 MMOL/L (ref 96–108)
CO2 SERPL-SCNC: 25 MMOL/L (ref 21–32)
CREAT SERPL-MCNC: 0.74 MG/DL (ref 0.6–1.3)
EOSINOPHIL # BLD AUTO: 1.04 THOUSAND/ÂΜL (ref 0–0.61)
EOSINOPHIL NFR BLD AUTO: 9 % (ref 0–6)
ERYTHROCYTE [DISTWIDTH] IN BLOOD BY AUTOMATED COUNT: 15.3 % (ref 11.6–15.1)
GFR SERPL CREATININE-BSD FRML MDRD: 90 ML/MIN/1.73SQ M
GLUCOSE SERPL-MCNC: 106 MG/DL (ref 65–140)
GLUCOSE SERPL-MCNC: 106 MG/DL (ref 65–140)
GLUCOSE SERPL-MCNC: 128 MG/DL (ref 65–140)
GLUCOSE SERPL-MCNC: 138 MG/DL (ref 65–140)
GLUCOSE SERPL-MCNC: 147 MG/DL (ref 65–140)
HCT VFR BLD AUTO: 41.8 % (ref 36.5–49.3)
HGB BLD-MCNC: 13.9 G/DL (ref 12–17)
IMM GRANULOCYTES # BLD AUTO: 0.05 THOUSAND/UL (ref 0–0.2)
IMM GRANULOCYTES NFR BLD AUTO: 1 % (ref 0–2)
LYMPHOCYTES # BLD AUTO: 4.09 THOUSANDS/ÂΜL (ref 0.6–4.47)
LYMPHOCYTES NFR BLD AUTO: 37 % (ref 14–44)
MCH RBC QN AUTO: 29.8 PG (ref 26.8–34.3)
MCHC RBC AUTO-ENTMCNC: 33.3 G/DL (ref 31.4–37.4)
MCV RBC AUTO: 90 FL (ref 82–98)
MONOCYTES # BLD AUTO: 0.79 THOUSAND/ÂΜL (ref 0.17–1.22)
MONOCYTES NFR BLD AUTO: 7 % (ref 4–12)
NEUTROPHILS # BLD AUTO: 4.96 THOUSANDS/ÂΜL (ref 1.85–7.62)
NEUTS SEG NFR BLD AUTO: 45 % (ref 43–75)
NRBC BLD AUTO-RTO: 0 /100 WBCS
PLATELET # BLD AUTO: 316 THOUSANDS/UL (ref 149–390)
PMV BLD AUTO: 8.5 FL (ref 8.9–12.7)
POTASSIUM SERPL-SCNC: 3.5 MMOL/L (ref 3.5–5.3)
RBC # BLD AUTO: 4.67 MILLION/UL (ref 3.88–5.62)
SODIUM SERPL-SCNC: 136 MMOL/L (ref 135–147)
WBC # BLD AUTO: 11.07 THOUSAND/UL (ref 4.31–10.16)

## 2024-02-16 PROCEDURE — 85025 COMPLETE CBC W/AUTO DIFF WBC: CPT | Performed by: INTERNAL MEDICINE

## 2024-02-16 PROCEDURE — 97163 PT EVAL HIGH COMPLEX 45 MIN: CPT

## 2024-02-16 PROCEDURE — 97167 OT EVAL HIGH COMPLEX 60 MIN: CPT

## 2024-02-16 PROCEDURE — 99232 SBSQ HOSP IP/OBS MODERATE 35: CPT

## 2024-02-16 PROCEDURE — 82948 REAGENT STRIP/BLOOD GLUCOSE: CPT

## 2024-02-16 PROCEDURE — 80048 BASIC METABOLIC PNL TOTAL CA: CPT | Performed by: INTERNAL MEDICINE

## 2024-02-16 RX ORDER — BISACODYL 10 MG
10 SUPPOSITORY, RECTAL RECTAL DAILY PRN
Status: DISCONTINUED | OUTPATIENT
Start: 2024-02-16 | End: 2024-02-20 | Stop reason: HOSPADM

## 2024-02-16 RX ORDER — POLYETHYLENE GLYCOL 3350 17 G/17G
238 POWDER, FOR SOLUTION ORAL ONCE
Qty: 238 G | Refills: 0 | Status: COMPLETED | OUTPATIENT
Start: 2024-02-16 | End: 2024-02-16

## 2024-02-16 RX ADMIN — CLOPIDOGREL BISULFATE 75 MG: 75 TABLET ORAL at 09:49

## 2024-02-16 RX ADMIN — NYSTATIN 1 APPLICATION: 100000 POWDER TOPICAL at 17:30

## 2024-02-16 RX ADMIN — BISACODYL 10 MG: 10 SUPPOSITORY RECTAL at 12:35

## 2024-02-16 RX ADMIN — PANTOPRAZOLE SODIUM 40 MG: 40 TABLET, DELAYED RELEASE ORAL at 17:29

## 2024-02-16 RX ADMIN — SENNOSIDES AND DOCUSATE SODIUM 2 TABLET: 8.6; 5 TABLET ORAL at 22:20

## 2024-02-16 RX ADMIN — HEPARIN SODIUM 5000 UNITS: 5000 INJECTION INTRAVENOUS; SUBCUTANEOUS at 05:35

## 2024-02-16 RX ADMIN — NYSTATIN 1 APPLICATION: 100000 POWDER TOPICAL at 09:49

## 2024-02-16 RX ADMIN — POLYETHYLENE GLYCOL 3350 238 G: 17 POWDER, FOR SOLUTION ORAL at 17:40

## 2024-02-16 RX ADMIN — LATANOPROST 1 DROP: 50 SOLUTION OPHTHALMIC at 22:26

## 2024-02-16 RX ADMIN — GABAPENTIN 300 MG: 300 CAPSULE ORAL at 17:30

## 2024-02-16 RX ADMIN — POLYETHYLENE GLYCOL 3350 17 G: 17 POWDER, FOR SOLUTION ORAL at 09:49

## 2024-02-16 RX ADMIN — ATORVASTATIN CALCIUM 80 MG: 80 TABLET, FILM COATED ORAL at 09:49

## 2024-02-16 RX ADMIN — HEPARIN SODIUM 5000 UNITS: 5000 INJECTION INTRAVENOUS; SUBCUTANEOUS at 14:16

## 2024-02-16 RX ADMIN — SERTRALINE HYDROCHLORIDE 25 MG: 25 TABLET ORAL at 22:20

## 2024-02-16 RX ADMIN — GABAPENTIN 300 MG: 300 CAPSULE ORAL at 09:49

## 2024-02-16 RX ADMIN — PANTOPRAZOLE SODIUM 40 MG: 40 TABLET, DELAYED RELEASE ORAL at 06:11

## 2024-02-16 RX ADMIN — HEPARIN SODIUM 5000 UNITS: 5000 INJECTION INTRAVENOUS; SUBCUTANEOUS at 22:20

## 2024-02-16 RX ADMIN — GABAPENTIN 600 MG: 300 CAPSULE ORAL at 22:20

## 2024-02-16 NOTE — UTILIZATION REVIEW
Initial Clinical Review    Patient started care in the ED on 2/14 and has already crossed 1 midnight.     Admission: Date/Time/Statement:   Admission Orders (From admission, onward)       Ordered        02/15/24 0116  INPATIENT ADMISSION  Once                          Orders Placed This Encounter   Procedures    INPATIENT ADMISSION     Standing Status:   Standing     Number of Occurrences:   1     Order Specific Question:   Level of Care     Answer:   Med Surg [16]     Order Specific Question:   Estimated length of stay     Answer:   More than 2 Midnights     Order Specific Question:   Certification     Answer:   I certify that inpatient services are medically necessary for this patient for a duration of greater than two midnights. See H&P and MD Progress Notes for additional information about the patient's course of treatment.     ED Arrival Information       Expected   -    Arrival   2/14/2024 22:41    Acuity   Urgent              Means of arrival   Ambulance    Escorted by   [facility]    Service   Hospitalist    Admission type   Emergency              Arrival complaint   Abdominal pain, Chest pain             Chief Complaint   Patient presents with    Medical Problem     Pt arrives via EMS from home with c/o R lower quadrant pain. Pt was here Monday for Suprapubic catheter replacement. Pt now reports chest pressure that has been going on for two weeks.       Initial Presentation: 73 y.o. male with a past medical history significant for CHF and multiple sclerosis resulting in neurogenic bladder requiring chronic suprapubic catheter. Patient presented with abdominal pain and recent exacerbation of his constipation. He was brought to the emergency room where he was found to have low-grade temperature urine dip confirmed probable urinary tract infection which was present on admission and catheter related. Plan: Inpatient admission for evaluation and treatment of stercoral colitis, UTI, constipation, CHF,  urinary retention, MS, HTN, anxiety, esophageal reflux, DM: Miralax bid, glycerin suppository-may need enema, IV Rocephin, continue Neurontin, lasix, inderal, Zoloft, Protonix, start SSI.     Date: 2/16    Day 3: Has surpassed a 2nd midnight with active treatments and services, which include IV antibiotics.      ED Triage Vitals [02/14/24 2246]   Temperature Pulse Respirations Blood Pressure SpO2   98 °F (36.7 °C) (!) 108 18 136/65 99 %      Temp Source Heart Rate Source Patient Position - Orthostatic VS BP Location FiO2 (%)   Temporal Monitor Lying Right arm --      Pain Score       4          Wt Readings from Last 1 Encounters:   02/16/24 66.2 kg (145 lb 15.1 oz)     Additional Vital Signs:     Date/Time Temp Pulse Resp BP MAP (mmHg) SpO2 O2 Device   02/16/24 07:07:58 98.4 °F (36.9 °C) 93 16 109/57 74 95 % None (Room air)   02/16/24 04:28:45 98.2 °F (36.8 °C) 95 15 103/56 72 94 % --   02/16/24 0000 -- -- -- -- -- -- None (Room air)   02/15/24 23:02:54 98.2 °F (36.8 °C) 97 18 118/58 78 97 % --   02/15/24 2154 -- -- -- -- -- -- None (Room air)   02/15/24 15:15:14 98.3 °F (36.8 °C) 86 18 111/55 74 99 % None (Room air)   02/15/24 15:11:51 98.3 °F (36.8 °C) 88 -- 111/55 74 97 % --   02/15/24 07:08:29 97.4 °F (36.3 °C) Abnormal  90 18 124/58 80 99 % None (Room air)   02/15/24 0225 -- -- -- -- -- -- None (Room air)   02/15/24 02:08:42 97.3 °F (36.3 °C) Abnormal  94 16 127/64 85 98 % --   02/15/24 0045 -- 98 14 137/61 88 97 % None (Room air)     Pertinent Labs/Diagnostic Test Results:   CT abdomen pelvis w contrast   Final Result by Jono Whitaker DO (02/15 0051)      Increased stool within the visualized colon. Correlate clinically for constipation. Additionally at the rectal vault there is hard and stool with thickening of the rectal wall suggestive of stercoral colitis.      Suprapubic catheter is in place. There is bladder wall thickening which could be due to nondistention, however correlate clinically and with  urinalysis to evaluate for possible cystitis.      Workstation performed: [id number]         XR chest 1 view portable   ED Interpretation by Barb Clement PA-C (02/14 2347)   No acute pathology as interpreted by myself.        Final Result by Shanti Gomez MD (02/15 2112)      No acute cardiopulmonary disease.            Workstation performed: [id number]           2/14 EKG:  Sinus tachycardia  Possible Lateral infarct , age undetermined  Abnormal ECG  When compared with ECG of 12-DEC-2023 21:23,  No significant change was found      Results from last 7 days   Lab Units 02/16/24  0431 02/14/24  2314   WBC Thousand/uL 11.07* 13.33*   HEMOGLOBIN g/dL 13.9 15.2   HEMATOCRIT % 41.8 45.0   PLATELETS Thousands/uL 316 350   NEUTROS ABS Thousands/µL 4.96 7.04         Results from last 7 days   Lab Units 02/16/24  0431 02/14/24  2314   SODIUM mmol/L 136 134*   POTASSIUM mmol/L 3.5 4.0   CHLORIDE mmol/L 103 99   CO2 mmol/L 25 27   ANION GAP mmol/L 8 8   BUN mg/dL 8 13   CREATININE mg/dL 0.74 0.74   EGFR ml/min/1.73sq m 90 90   CALCIUM mg/dL 9.9 10.2     Results from last 7 days   Lab Units 02/14/24  2314   AST U/L 16   ALT U/L 14   ALK PHOS U/L 78   TOTAL PROTEIN g/dL 7.1   ALBUMIN g/dL 3.8   TOTAL BILIRUBIN mg/dL 0.29     Results from last 7 days   Lab Units 02/16/24  0708 02/15/24  2110 02/15/24  1514 02/15/24  1102   POC GLUCOSE mg/dl 106 146* 112 89     Results from last 7 days   Lab Units 02/16/24  0431 02/14/24  2314   GLUCOSE RANDOM mg/dL 106 122         Results from last 7 days   Lab Units 02/14/24  2314   HS TNI 0HR ng/L 7         Results from last 7 days   Lab Units 02/14/24  2314   PROCALCITONIN ng/ml 0.06     Results from last 7 days   Lab Units 02/15/24  0131   LACTIC ACID mmol/L 1.9           Results from last 7 days   Lab Units 02/14/24  2316   CLARITY UA  Clear   COLOR UA  Light Yellow   SPEC GRAV UA  1.007   PH UA  6.0   GLUCOSE UA mg/dl >=1000 (1%)*   KETONES UA mg/dl Negative   BLOOD UA   Moderate*   PROTEIN UA mg/dl Negative   NITRITE UA  Negative   BILIRUBIN UA  Negative   UROBILINOGEN UA (BE) mg/dl <2.0   LEUKOCYTES UA  Large*   WBC UA /hpf Innumerable*   RBC UA /hpf 20-30*   BACTERIA UA /hpf Innumerable*   EPITHELIAL CELLS WET PREP /hpf Occasional         Results from last 7 days   Lab Units 02/15/24  0131 02/14/24  2316   BLOOD CULTURE  No Growth at 24 hrs.  No Growth at 24 hrs.  --    URINE CULTURE   --  70,000-79,000 cfu/ml Gram Negative Torin Enteric Like*         ED Treatment:   Medication Administration from 02/14/2024 2241 to 02/15/2024 0201         Date/Time Order Dose Route Action     02/14/2024 2317 EST ondansetron (ZOFRAN) injection 4 mg 4 mg Intravenous Given     02/14/2024 2317 EST ketorolac (TORADOL) injection 15 mg 15 mg Intravenous Given     02/15/2024 0012 EST iohexol (OMNIPAQUE) 350 MG/ML injection (MULTI-DOSE) 100 mL 100 mL Intravenous Given     02/15/2024 0133 EST cefTRIAXone (ROCEPHIN) IVPB (premix in dextrose) 2,000 mg 50 mL 2,000 mg Intravenous New Bag          Past Medical History:   Diagnosis Date    Acute laryngitis     Acute nonsuppurative otitis media, unspecified laterality     Arm weakness     Arthritis     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[facility])     Bladder infection     Bronchitis     Constipation     Cough     Diabetes mellitus ([facility])     Dizziness     Dysfunction of eustachian tube     Erectile dysfunction of non-organic origin     Fatigue     Glaucoma     Hiatal hernia     Hypertension     Imbalance     Leg muscle spasm     MS (multiple sclerosis) ([facility])     Nephrolithiasis     Neurogenic bladder     No natural teeth     Sinus pain     Spinal stenosis     Strain of thoracic region     Stroke ([facility])     Suprapubic catheter ([facility])      Present on Admission:   Diabetic neuropathy ([facility])   Esophageal reflux   Generalized anxiety disorder   Primary hypertension   Multiple sclerosis ([facility])   Urinary retention   Chronic diastolic congestive heart failure ([facility])    Constipation      Admitting Diagnosis: UTI (urinary tract infection) [N39.0]  Known medical problems [Z78.9]  Age/Sex: 73 y.o. male  Admission Orders:  Scheduled Medications:  amLODIPine, 10 mg, Oral, Daily   And  atorvastatin, 80 mg, Oral, Daily  cefTRIAXone, 1,000 mg, Intravenous, Q24H  clopidogrel, 75 mg, Oral, Daily  gabapentin, 300 mg, Oral, BID  gabapentin, 600 mg, Oral, HS  heparin (porcine), 5,000 Units, Subcutaneous, Q8H CIERA  insulin lispro, 1-5 Units, Subcutaneous, TID AC  insulin lispro, 1-5 Units, Subcutaneous, HS  latanoprost, 1 drop, Both Eyes, HS  nystatin, , Topical, BID  pantoprazole, 40 mg, Oral, BID AC  polyethylene glycol, 17 g, Oral, Daily  propranolol, 60 mg, Oral, Daily  senna-docusate sodium, 2 tablet, Oral, HS  sertraline, 25 mg, Oral, HS      Continuous IV Infusions:     PRN Meds:  acetaminophen, 650 mg, Oral, Q6H PRN  albuterol, 2.5 mg, Nebulization, Q6H PRN  ALPRAZolam, 0.25 mg, Oral, BID PRN  melatonin, 3 mg, Oral, HS PRN  ondansetron, 4 mg, Intravenous, Q6H PRN  witch hazel-glycerin, 1 Pad, Topical, Q4H PRN        IP CONSULT TO CASE MANAGEMENT    Network Utilization Review Department  ATTENTION: Please call with any questions or concerns to [phone] and carefully listen to the prompts so that you are directed to the right person. All voicemails are confidential.   For Discharge needs, contact Care Management DC Support Team at [phone] opt. 2  Send all requests for admission clinical reviews, approved or denied determinations and any other requests to dedicated fax number below belonging to the campus where the patient is receiving treatment. List of dedicated fax numbers for the Facilities:  FACILITY NAME UR FAX NUMBER   ADMISSION DENIALS (Administrative/Medical Necessity) [phone]   DISCHARGE SUPPORT TEAM (NETWORK) [phone]   PARENT CHILD HEALTH (Maternity/NICU/Pediatrics) [phone]   [facility] [phone]   [facility]  [facility] [phone]   [facility] [phone]   [facility] [phone]   [facility] [phone]   [facility] [phone]   [facility] [phone]   [facility] [phone]   [facility] [phone]   [facility] [phone]   [facility] [phone]   [facility] [phone]

## 2024-02-16 NOTE — PLAN OF CARE
Problem: PHYSICAL THERAPY ADULT  Goal: Performs mobility at highest level of function for planned discharge setting.  See evaluation for individualized goals.  Description: Treatment/Interventions: Functional transfer training, LE strengthening/ROM, Therapeutic exercise, Endurance training, Patient/family training, Bed mobility, Gait training, Spoke to nursing, Spoke to case management, OT          See flowsheet documentation for full assessment, interventions and recommendations.  Note: Prognosis: Guarded  Problem List: Decreased strength, Decreased endurance, Impaired balance, Decreased mobility, Decreased safety awareness, Decreased cognition, Impaired judgement, Pain  Assessment: Pt. 73 y.o.male presented w/ c/o abdominal pain, fever & possible UTI. Pt admitted for Stercoral colitis w/ fecal impaction per CT & UTI (urinary tract infection) w/ chronic indwelling urethral catheter. Pt referred to PT for mobility assessment & D/C planning w/ orders of Up and OOB as tolerated . Pt is poor historian, info on home setup and PLOF obtained via chart and pt. Please see above for information re: home set-up & PLOF as well as objective findings during PT assessment.  On eval, pt functioning below baseline hence will continue skilled PT to improve function & safety. Pt require modAx2 for supine to sit; maxAx2 for sit to supine; maxAx2 for standing trial + cues for techniques & safety. Pt able to achieve ~75% towards full standing position with Max A of 2 w/ B/L HHA & assist to block B/L knees. (+) significant posterior lean. The patient's AM-PAC Basic Mobility Inpatient Short Form Raw Score is 7. A Raw score of less than or equal to 16 suggests the patient may benefit from discharge to post-acute rehabilitation services. Please also refer to the recommendation of the Physical Therapist for safe discharge planning. From PT standpoint, will recommend Level II (moderate resource intensity) rehab services at D/C. No SOB &  "dizziness reported t/o session. Nsg staff most recent vital signs as follows: /57 (BP Location: Right arm)   Pulse 93   Temp 98.4 °F (36.9 °C) (Oral)   Resp 16   Ht 5' 7\" (1.702 m)   Wt 66.2 kg (145 lb 15.1 oz)   SpO2 95%   BMI 22.86 kg/m² . At end of session, pt back in bed in stable condition, call bell & phone in reach, bed alarm activated, all lines intact. Fall precautions reinforced w/ good understanding. CM to follow. Nsg staff to use asim lift for OOB. Nsg notified. Co-eval was necessary to complete this PT eval for the pt's best interest given pt's medical acuity & complexity.        Rehab Resource Intensity Level, PT: II (Moderate Resource Intensity)    See flowsheet documentation for full assessment.        "

## 2024-02-16 NOTE — PROGRESS NOTES
[facility]  Progress Note  Name: Mathew Rico I  MRN: [id number]  Unit/Bed#: E5 -01 I Date of Admission: 2/14/2024   Date of Service: 2/16/2024 I Hospital Day: 1    Assessment/Plan   * Stercoral colitis  Assessment & Plan  CC was for abdomen pain. Ct shows fecal impaction.   Still endorsing abdominal discomfort, but passing gas.   Continue scheduled miralax, senna.   Attempted enema yesterday without success.   Suppository ordered for this morning  If unsuccessful will order glycolax bowel prep     Urinary tract infection associated with indwelling urethral catheter   Assessment & Plan  Grossly positive UA.  Urine culture >100,000 kleb pneumoniae on 2/12   F/u Urine culture susp.   Continue abx with duration 7 days,   Day 2/7 rocephin    Constipation  Assessment & Plan  Continue scheduled miralax, senna.   See stercoral colitis for further plan     Chronic diastolic congestive heart failure (HCC)  Assessment & Plan  Wt Readings from Last 3 Encounters:   02/16/24 66.2 kg (145 lb 15.1 oz)   02/12/24 69 kg (152 lb 1.9 oz)   12/12/23 70.6 kg (155 lb 10.3 oz)       Appears compensated. , monitor I/o and weights        Urinary retention  Assessment & Plan  Chronic due to MS.     Multiple sclerosis (HCC)  Assessment & Plan  Continue neurontin    Primary hypertension  Assessment & Plan  Continue home propanolol and norvasc     Generalized anxiety disorder  Assessment & Plan  As needed xanax, and zoloft . Check PDmP reg benzo    Esophageal reflux  Assessment & Plan  Continue protonix    Chronic suprapubic catheter (HCC)  Assessment & Plan  Hx of Multiple sclerosis with chronic indwelling dela cruz catherer. Continue abx.   Hospitalized 2/12 presenting with suprapubic cath dysfunction - At home, cath unable to be passed.   Exchanged 2/12 by urology in ED and d/c home    Diabetic neuropathy (HCC)  Assessment & Plan  Lab Results   Component Value Date    HGBA1C 8.1 (H) 12/04/2023       Recent  Labs     02/15/24  1102 02/15/24  1514 02/15/24  2110 24  0708   POCGLU 89 112 146* 106       Blood Sugar Average: Last 72 hrs:  (P) 113.25  Will place on SSI and basil insulin  as needed . On trulicity at home.          VTE Pharmacologic Prophylaxis: VTE Score: 3 High Risk (Score >/= 5) - Pharmacological DVT Prophylaxis Ordered: heparin. Sequential Compression Devices Ordered.    Mobility:   Basic Mobility Inpatient Raw Score: 7  -HLM Goal: 2: Bed activities/Dependent transfer  -HLM Achieved: 1: Laying in bed  HLM Goal NOT achieved. Continue with multidisciplinary rounding and encourage appropriate mobility to improve upon HLM goals.    Patient Centered Rounds: I performed bedside rounds with nursing staff today.   Discussions with Specialists or Other Care Team Provider: None    Education and Discussions with Family / Patient:  Will update brother.     Total Time Spent on Date of Encounter in care of patient: This time was spent on one or more of the following: performing physical exam; counseling and coordination of care; obtaining or reviewing history; documenting in the medical record; reviewing/ordering tests, medications or procedures; communicating with other healthcare professionals and discussing with patient's family/caregivers.    Current Length of Stay: 1 day(s)  Current Patient Status: Inpatient   Certification Statement: The patient will continue to require additional inpatient hospital stay due to continued medical treatment of stercoral colitis and UTI  Discharge Plan: Anticipate discharge in 24-48 hrs to pending     Code Status: Level 1 - Full Code    Subjective:   Seen and examined this morning. No acute events over night. Endorsing bloating and LLQ discomfort. He is passing gas. States he is eating and drinking without difficulty. He is urinating via suprapubic cath. Has not had a BM. No additional complaints at this time.     Objective:     Vitals:   Temp (24hrs), Av.3 °F (36.8  °C), Min:98.2 °F (36.8 °C), Max:98.4 °F (36.9 °C)    Temp:  [98.2 °F (36.8 °C)-98.4 °F (36.9 °C)] 98.4 °F (36.9 °C)  HR:  [86-97] 93  Resp:  [15-18] 16  BP: (103-118)/(55-58) 109/57  SpO2:  [94 %-99 %] 95 %  Body mass index is 22.86 kg/m².     Input and Output Summary (last 24 hours):     Intake/Output Summary (Last 24 hours) at 2/16/2024 1131  Last data filed at 2/16/2024 0401  Gross per 24 hour   Intake --   Output 825 ml   Net -825 ml       Physical Exam:   Physical Exam  Constitutional:       General: He is not in acute distress.     Appearance: He is ill-appearing. He is not toxic-appearing.   HENT:      Mouth/Throat:      Mouth: Mucous membranes are moist.   Eyes:      Conjunctiva/sclera: Conjunctivae normal.   Cardiovascular:      Rate and Rhythm: Normal rate.      Heart sounds: No murmur heard.     No friction rub. No gallop.   Pulmonary:      Breath sounds: No wheezing, rhonchi or rales.   Abdominal:      General: Bowel sounds are normal. There is no distension.      Palpations: Abdomen is soft.      Tenderness: There is abdominal tenderness (LLQ). There is no guarding.   Musculoskeletal:      Right lower leg: No edema.      Left lower leg: No edema.   Skin:     Coloration: Skin is pale.   Psychiatric:         Mood and Affect: Mood normal.        Additional Data:     Labs:  Results from last 7 days   Lab Units 02/16/24  0431   WBC Thousand/uL 11.07*   HEMOGLOBIN g/dL 13.9   HEMATOCRIT % 41.8   PLATELETS Thousands/uL 316   NEUTROS PCT % 45   LYMPHS PCT % 37   MONOS PCT % 7   EOS PCT % 9*     Results from last 7 days   Lab Units 02/16/24  0431 02/14/24  2314   SODIUM mmol/L 136 134*   POTASSIUM mmol/L 3.5 4.0   CHLORIDE mmol/L 103 99   CO2 mmol/L 25 27   BUN mg/dL 8 13   CREATININE mg/dL 0.74 0.74   ANION GAP mmol/L 8 8   CALCIUM mg/dL 9.9 10.2   ALBUMIN g/dL  --  3.8   TOTAL BILIRUBIN mg/dL  --  0.29   ALK PHOS U/L  --  78   ALT U/L  --  14   AST U/L  --  16   GLUCOSE RANDOM mg/dL 106 122         Results  from last 7 days   Lab Units 02/16/24  1118 02/16/24  0708 02/15/24  2110 02/15/24  1514 02/15/24  1102   POC GLUCOSE mg/dl 147* 106 146* 112 89         Results from last 7 days   Lab Units 02/15/24  0131 02/14/24  2314   LACTIC ACID mmol/L 1.9  --    PROCALCITONIN ng/ml  --  0.06       Lines/Drains:  Invasive Devices       Peripheral Intravenous Line  Duration             Peripheral IV 02/14/24 Left Antecubital 1 day    Peripheral IV 02/15/24 Dorsal (posterior);Right Forearm 1 day              Drain  Duration             Suprapubic Catheter 16 Fr. 3 days                          Imaging: No pertinent imaging reviewed.    Recent Cultures (last 7 days):   Results from last 7 days   Lab Units 02/15/24  0131 02/14/24  2316   BLOOD CULTURE  No Growth at 24 hrs.  No Growth at 24 hrs.  --    URINE CULTURE   --  70,000-79,000 cfu/ml Gram Negative Torin Enteric Like*       Last 24 Hours Medication List:   Current Facility-Administered Medications   Medication Dose Route Frequency Provider Last Rate    acetaminophen  650 mg Oral Q6H PRN Petra Luong MD      albuterol  2.5 mg Nebulization Q6H PRN Petra Luong MD      ALPRAZolam  0.25 mg Oral BID PRN Petra Luong MD      amLODIPine  10 mg Oral Daily Petra Luong MD      And    atorvastatin  80 mg Oral Daily Petra Luong MD      bisacodyl  10 mg Rectal Daily PRN Nga Sawant PA-C      cefTRIAXone  1,000 mg Intravenous Q24H Petra Luong MD 1,000 mg (02/15/24 2314)    clopidogrel  75 mg Oral Daily Petra Luong MD      gabapentin  300 mg Oral BID Petra Luong MD      gabapentin  600 mg Oral HS Petra Luong MD      heparin (porcine)  5,000 Units Subcutaneous Q8H CIERA Petra Luong MD      insulin lispro  1-5 Units Subcutaneous TID AC Petra Luong MD      insulin lispro  1-5 Units Subcutaneous HS Petra Luong MD      latanoprost  1 drop Both Eyes HS Petra Luong MD      melatonin  3 mg Oral HS PRN Petra L Cherise,  MD      nystatin   Topical BID Petra Luong MD      ondansetron  4 mg Intravenous Q6H PRN Petra Luong MD      pantoprazole  40 mg Oral BID AC Petra Luong MD      polyethylene glycol  17 g Oral Daily Nga Sawant PA-C      propranolol  60 mg Oral Daily Petra Luong MD      senna-docusate sodium  2 tablet Oral HS Petra Luong MD      sertraline  25 mg Oral HS Petra L Cherise, MD      witch hazel-glycerin  1 Pad Topical Q4H PRN Petra Luong MD          Today, Patient Was Seen By: Nga Sawant PA-C    **Please Note: This note may have been constructed using a voice recognition system.**

## 2024-02-16 NOTE — PLAN OF CARE
Problem: PAIN - ADULT  Goal: Verbalizes/displays adequate comfort level or baseline comfort level  Description: Interventions:  - Encourage patient to monitor pain and request assistance  - Assess pain using appropriate pain scale  - Administer analgesics based on type and severity of pain and evaluate response  - Implement non-pharmacological measures as appropriate and evaluate response  - Consider cultural and social influences on pain and pain management  - Notify physician/advanced practitioner if interventions unsuccessful or patient reports new pain  Outcome: Progressing     Problem: INFECTION - ADULT  Goal: Absence or prevention of progression during hospitalization  Description: INTERVENTIONS:  - Assess and monitor for signs and symptoms of infection  - Monitor lab/diagnostic results  - Monitor all insertion sites, i.e. indwelling lines, tubes, and drains  - Monitor endotracheal if appropriate and nasal secretions for changes in amount and color  - [place] appropriate cooling/warming therapies per order  - Administer medications as ordered  - Instruct and encourage patient and family to use good hand hygiene technique  - Identify and instruct in appropriate isolation precautions for identified infection/condition  Outcome: Progressing  Goal: Absence of fever/infection during neutropenic period  Description: INTERVENTIONS:  - Monitor WBC    Outcome: Progressing     Problem: SAFETY ADULT  Goal: Patient will remain free of falls  Description: INTERVENTIONS:  - Educate patient/family on patient safety including physical limitations  - Instruct patient to call for assistance with activity   - Consult OT/PT to assist with strengthening/mobility   - Keep Call bell within reach  - Keep bed low and locked with side rails adjusted as appropriate  - Keep care items and personal belongings within reach  - Initiate and maintain comfort rounds  - Make Fall Risk Sign visible to staff  - Offer Toileting every  Hours,  in advance of need  - Initiate/Maintain alarm  - Obtain necessary fall risk management equipment:   - Apply yellow socks and bracelet for high fall risk patients  - Consider moving patient to room near nurses station  Outcome: Progressing  Goal: Maintain or return to baseline ADL function  Description: INTERVENTIONS:  -  Assess patient's ability to carry out ADLs; assess patient's baseline for ADL function and identify physical deficits which impact ability to perform ADLs (bathing, care of mouth/teeth, toileting, grooming, dressing, etc.)  - Assess/evaluate cause of self-care deficits   - Assess range of motion  - Assess patient's mobility; develop plan if impaired  - Assess patient's need for assistive devices and provide as appropriate  - Encourage maximum independence but intervene and supervise when necessary  - Involve family in performance of ADLs  - Assess for home care needs following discharge   - Consider OT consult to assist with ADL evaluation and planning for discharge  - Provide patient education as appropriate  Outcome: Progressing  Goal: Maintains/Returns to pre admission functional level  Description: INTERVENTIONS:  - Perform AM-PAC 6 Click Basic Mobility/ Daily Activity assessment daily.  - Set and communicate daily mobility goal to care team and patient/family/caregiver.   - Collaborate with rehabilitation services on mobility goals if consulted  - Perform Range of Motion  times a day.  - Reposition patient every  hours.  - Dangle patient  times a day  - Stand patient  times a day  - Ambulate patient  times a day  - Out of bed to chair  times a day   - Out of bed for meals times a day  - Out of bed for toileting  - Record patient progress and toleration of activity level   Outcome: Progressing     Problem: DISCHARGE PLANNING  Goal: Discharge to home or other facility with appropriate resources  Description: INTERVENTIONS:  - Identify barriers to discharge w/patient and caregiver  - Arrange for  needed discharge resources and transportation as appropriate  - Identify discharge learning needs (meds, wound care, etc.)  - Arrange for interpretive services to assist at discharge as needed  - Refer to Case Management Department for coordinating discharge planning if the patient needs post-hospital services based on physician/advanced practitioner order or complex needs related to functional status, cognitive ability, or social support system  Outcome: Progressing     Problem: Knowledge Deficit  Goal: Patient/family/caregiver demonstrates understanding of disease process, treatment plan, medications, and discharge instructions  Description: Complete learning assessment and assess knowledge base.  Interventions:  - Provide teaching at level of understanding  - Provide teaching via preferred learning methods  Outcome: Progressing     Problem: Prexisting or High Potential for Compromised Skin Integrity  Goal: Skin integrity is maintained or improved  Description: INTERVENTIONS:  - Identify patients at risk for skin breakdown  - Assess and monitor skin integrity  - Assess and monitor nutrition and hydration status  - Monitor labs   - Assess for incontinence   - Turn and reposition patient  - Assist with mobility/ambulation  - Relieve pressure over bony prominences  - Avoid friction and shearing  - Provide appropriate hygiene as needed including keeping skin clean and dry  - Evaluate need for skin moisturizer/barrier cream  - Collaborate with interdisciplinary team   - Patient/family teaching  - Consider wound care consult   Outcome: Progressing     Problem: Nutrition/Hydration-ADULT  Goal: Nutrient/Hydration intake appropriate for improving, restoring or maintaining nutritional needs  Description: Monitor and assess patient's nutrition/hydration status for malnutrition. Collaborate with interdisciplinary team and initiate plan and interventions as ordered.  Monitor patient's weight and dietary intake as ordered or  per policy. Utilize nutrition screening tool and intervene as necessary. Determine patient's food preferences and provide high-protein, high-caloric foods as appropriate.     INTERVENTIONS:  - Monitor oral intake, urinary output, labs, and treatment plans  - Assess nutrition and hydration status and recommend course of action  - Evaluate amount of meals eaten  - Assist patient with eating if necessary   - Allow adequate time for meals  - Recommend/ encourage appropriate diets, oral nutritional supplements, and vitamin/mineral supplements  - Order, calculate, and assess calorie counts as needed  - Recommend, monitor, and adjust tube feedings and TPN/PPN based on assessed needs  - Assess need for intravenous fluids  - Provide specific nutrition/hydration education as appropriate  - Include patient/family/caregiver in decisions related to nutrition  Outcome: Progressing

## 2024-02-16 NOTE — CASE MANAGEMENT
Case Management Discharge Planning Note    Patient name Mathew Rico  Location [facility] /E5 -* MRN [id number]  : 1950 Date 2024       Current Admission Date: 2024  Current Admission Diagnosis:Stercoral colitis   Patient Active Problem List    Diagnosis Date Noted    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    Urinary tract infection associated with indwelling urethral catheter  02/15/2024    Fall 2023    Weakness 2023    Accidental fall from chair 2023    Constipation 2023    Chronic diastolic congestive heart failure (HCC) 2023    Hyponatremia 2023    Excessive daytime sleepiness 2022    Shortness of breath 2022    Pancreatic mass 2020    Pancreatic lesion 2020    Bladder stones 2019    Bladder neck contracture 2019    History of CVA (cerebrovascular accident) 10/21/2018    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HCC) 2017    Diabetic neuropathy (HCC) 2016    Chronic suprapubic catheter (HCC) 2016    Thyroid nodule 2015    Cervical spinal stenosis 2014    Generalized anxiety disorder 2014    Urinary retention 2014    Obstructive sleep apnea 2013    Benign colon polyp 2012    Esophageal reflux 2012    Fatty liver 2012    Glaucoma 2012    Hyperlipidemia 2012    Multiple sclerosis (HCC) 2012    Type 2 diabetes mellitus with hyperglycemia, without long-term current use of insulin (HCC) 2012    Primary hypertension 2012      LOS (days): 1  Geometric Mean LOS (GMLOS) (days): 3.3  Days to GMLOS:1.9     OBJECTIVE:  Risk of Unplanned Readmission Score: 27.62         Current admission status: Inpatient   Preferred Pharmacy:   [facility]/pharmacy #[id number] - [place] - [address] [address]  Phone: [phone] Fax: [phone]    [place], [place] [facility]  [address] [facility]  [address]  Phone: [phone] Fax: [phone]    [facility] - [place] [place] - [address] [facility],  [address] [facility],  [address] [facility] [address]  Phone: [phone] Fax: [phone]    Primary Care Provider: Steve Dickerson DO    Primary Insurance: [employer] [facility]  Secondary Insurance:     DISCHARGE DETAILS:         Additional Comments: Patient needs STR and has [employer]. CM sent a voicemail to patient's insurance rep Arina at ext [phone] and sent rehab referrals in [facility]

## 2024-02-16 NOTE — PHYSICAL THERAPY NOTE
PT EVALUATION    Pt. Name: Mathew Rico  Pt. Age: 73 y.o.  MRN: [id number]  LENGTH OF STAY: 1      Admitting Diagnoses:   UTI (urinary tract infection) [N39.0]  Known medical problems [Z78.9]    Past Medical History:   Diagnosis Date    Acute laryngitis     Acute nonsuppurative otitis media, unspecified laterality     Arm weakness     Arthritis     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HCC)     Bladder infection     Bronchitis     Constipation     Cough     Diabetes mellitus (HCC)     Dizziness     Dysfunction of eustachian tube     Erectile dysfunction of non-organic origin     Fatigue     Glaucoma     Hiatal hernia     Hypertension     Imbalance     Leg muscle spasm     MS (multiple sclerosis) (HCC)     Nephrolithiasis     Neurogenic bladder     No natural teeth     Sinus pain     Spinal stenosis     Strain of thoracic region     Stroke (HCC)     Suprapubic catheter (HCC)        Past Surgical History:   Procedure Laterality Date    APPENDECTOMY      BRAIN SURGERY      Coil placed in aneurysm    CYSTOSCOPY      CYSTOSCOPY      CYSTOSCOPY  06/11/2018    CYSTOSCOPY  01/15/2021    EYE SURGERY      transscleral cyclophotocoagulation noncontact YAG laser    MYRINGOTOMY      with ventilation tube insertion    OH LITHOLAPAXY SMPL/SM <2.5 CM N/A 5/7/2019    Procedure: CYSTOSCOPY, holmium laser litholapaxy of bladder stones, EXCHANGE OF SP TUBE;  Surgeon: Javy Jeffries MD;  [facility]: [facility];  Service: Urology    SUPRAPUBIC CATHETER INSERTION         Imaging Studies:  CT abdomen pelvis w contrast   Final Result by Jono Whitaker DO (02/15 0051)      Increased stool within the visualized colon. Correlate clinically for constipation. Additionally at the rectal vault there is hard and stool with thickening of the rectal wall suggestive of stercoral colitis.      Suprapubic catheter is in place. There is bladder wall thickening which could be due to nondistention, however correlate clinically and with urinalysis to  "evaluate for possible cystitis.      Workstation performed: [id number]         XR chest 1 view portable   ED Interpretation by Barb Clement PA-C (02/14 2347)   No acute pathology as interpreted by myself.        Final Result by Shanti Gomez MD (02/15 2112)      No acute cardiopulmonary disease.            Workstation performed: [id number]               02/16/24 1014   PT Last Visit   PT Visit Date 02/16/24   Note Type   Note type Evaluation   Pain Assessment   Pain Score No Pain   Restrictions/Precautions   Weight Bearing Precautions Per Order No   Other Precautions Cognitive;Chair Alarm;Bed Alarm;Contact/isolation;Fall Risk   Home Living   Type of Home House   Home Layout One level;Ramped entrance;Performs ADLs on one level;Able to live on main level with bedroom/bathroom   Bathroom Shower/Tub Walk-in shower  (sponge bathes at baseline)   Bathroom Toilet Standard   Bathroom Equipment Grab bars in shower;Shower chair;Grab bars around toilet;Commode   Home Equipment Walker;Wheelchair-manual;Other (Comment)  (sit<>stand lift)   Additional Comments Pt poor historian. Above information gathered from chart.   Prior Function   Level of [place] Needs assistance with ADLs;Needs assistance with functional mobility;Needs assistance with IADLS   Lives With Family  (brother & sister)   Receives Help From Family;Home health   Falls in the last 6 months 1 to 4  (3x)   Vocational Retired   Comments Per chart, pt required assist w/ ADLs, IADLs, and functional transfers using sit<>stand machine. Pt initially reports non-ambulatory at baseline, however later reports he can take \"a few\" steps with home therapy. (-) . Previous notes indicate pt has HHA, however pt reports he no longer has HHAs, ? Accuracy. Pt goes to [facility] 1x/wk. Receiving HHPT/OT PTA.   General   Family/Caregiver Present No   Cognition   Overall Cognitive Status Impaired   Arousal/Participation Alert   Attention Attends with cues to " redirect   Orientation Level Oriented to person;Oriented to place;Disoriented to time;Disoriented to situation   Following Commands Follows one step commands with increased time or repetition   Comments pleasant & cooperative; poor historian; impaired insight to deficits   Subjective   Subjective Pt agreeable to PT/Ot evals.   RUE Assessment   RUE Assessment   (refer to OT)   LUE Assessment   LUE Assessment   (refer to OT)   RLE Assessment   RLE Assessment X  (3-/5 grossly except hip 2/5)   LLE Assessment   LLE Assessment X  (3-/5 grossly except hip 2/5)   Bed Mobility   Supine to Sit 3  Moderate assistance   Additional items Assist x 2;HOB elevated;Bedrails;Increased time required;Verbal cues;LE management   Sit to Supine 2  Maximal assistance   Additional items Assist x 2;Increased time required;Verbal cues;LE management   Additional Comments cues for techniques & safety; pt able to sit at EOB w/ B/L railing support; (+) posterior lean   Transfers   Sit to Stand 2  Maximal assistance   Additional items Assist x 2;Increased time required;Verbal cues;Bedrails   Stand to Sit 2  Maximal assistance   Additional items Assist x 2;Increased time required;Verbal cues   Additional Comments Pt only able to achieve approx. 75% towards full standing w/ maxAx2 w/ B/L HHA & B/L knee blocking.   Ambulation/Elevation   Gait pattern Not appropriate   Ambulation/Elevation Additional Comments will recommend asim lift for OOB   Balance   Static Sitting Poor +   Dynamic Sitting Poor   Static Standing Zero   Dynamic Standing   (zero)   Ambulatory Zero   Activity Tolerance   Activity Tolerance Patient limited by fatigue;Treatment limited secondary to medical complications (Comment)   Medical Staff Made Aware OTR Kary   Nurse Made Aware yes, Annamarie   Assessment   Prognosis Guarded   Problem List Decreased strength;Decreased endurance;Impaired balance;Decreased mobility;Decreased safety awareness;Decreased cognition;Impaired judgement;Pain  "  Assessment Pt. 73 y.o.male presented w/ c/o abdominal pain, fever & possible UTI. Pt admitted for Stercoral colitis w/ fecal impaction per CT & UTI (urinary tract infection) w/ chronic indwelling urethral catheter. Pt referred to PT for mobility assessment & D/C planning w/ orders of Up and OOB as tolerated . Pt is poor historian, info on home setup and PLOF obtained via chart and pt. Please see above for information re: home set-up & PLOF as well as objective findings during PT assessment.  On eval, pt functioning below baseline hence will continue skilled PT to improve function & safety. Pt require modAx2 for supine to sit; maxAx2 for sit to supine; maxAx2 for standing trial + cues for techniques & safety. Pt able to achieve ~75% towards full standing position with Max A of 2 w/ B/L HHA & assist to block B/L knees. (+) significant posterior lean. The patient's AM-PAC Basic Mobility Inpatient Short Form Raw Score is 7. A Raw score of less than or equal to 16 suggests the patient may benefit from discharge to post-acute rehabilitation services. Please also refer to the recommendation of the Physical Therapist for safe discharge planning. From PT standpoint, will recommend Level II (moderate resource intensity) rehab services at D/C. No SOB & dizziness reported t/o session. Nsg staff most recent vital signs as follows: /57 (BP Location: Right arm)   Pulse 93   Temp 98.4 °F (36.9 °C) (Oral)   Resp 16   Ht 5' 7\" (1.702 m)   Wt 66.2 kg (145 lb 15.1 oz)   SpO2 95%   BMI 22.86 kg/m² . At end of session, pt back in bed in stable condition, call bell & phone in reach, bed alarm activated, all lines intact. Fall precautions reinforced w/ good understanding. CM to follow. Nsg staff to use asim lift for OOB. Nsg notified. Co-eval was necessary to complete this PT eval for the pt's best interest given pt's medical acuity & complexity.   Goals   Patient Goals to get better   STG Expiration Date 03/01/24   Short " Term Goal #1 Goals to be met in 14 days; pt will be able to: 1) inc strength & balance by 1/2 grade to improve overall functional mobility & dec fall risk; 2) inc bed mobility to minAx1 for pt to be able to get in/OOB safely w/ proper techniques 100% of the time, to dec caregiver burden & safely function at home; 3) inc transfers to modAx1 for pt to transition safely from one surface to another w/o % of the time, to dec caregiver burden & safely function at home; 4) inc amb w/ RW approx. >20' w/ modAx1 for pt to ambulate as tolerated w/o any % of the time, to dec caregiver burden & safely function at home; 5) minAx1 for w/c mobility & management on level surface approx. >150'; pt/caregiver ed   PT Treatment Day 0   Plan   Treatment/Interventions Functional transfer training;LE strengthening/ROM;Therapeutic exercise;Endurance training;Patient/family training;Bed mobility;Gait training;Spoke to nursing;Spoke to case management;OT   PT Frequency 2-3x/wk   Discharge Recommendation   Rehab Resource Intensity Level, PT II (Moderate Resource Intensity)   Additional Comments Nsg staff to use asim lift for OOB.   AM-PAC Basic Mobility Inpatient   Turning in Flat Bed Without Bedrails 2   Lying on Back to Sitting on Edge of Flat Bed Without Bedrails 1   Moving Bed to Chair 1   Standing Up From Chair Using Arms 1   Walk in Room 1   Climb 3-5 Stairs With Railing 1   Basic Mobility Inpatient Raw Score 7   Turning Head Towards Sound 3   Follow Simple Instructions 2   Low Function Basic Mobility Raw Score  12   Low Function Basic Mobility Standardized Score  18.33   Highest Level Of Mobility   -[facility] Goal 2: Bed activities/Dependent transfer   Hx/personal factors: co-morbidities, inaccessible home, dec caregiver support, advanced age, mutliple lines, use of AD, dec cognition, pain, h/o of falls, fall risk, and assist w/ ADL's  Examination: dec mobility, dec balance, dec endurance, dec amb, risk for falls, pain, dec  cognition  Clinical: unpredictable (ongoing medical status, abnormal lab values, risk for falls, and pain mgt)  Complexity: high    Bridger Beyer

## 2024-02-16 NOTE — OCCUPATIONAL THERAPY NOTE
Occupational Therapy Evaluation     Patient Name: Mathew Rico  Today's Date: 2/16/2024  Problem List  Principal Problem:    Stercoral colitis  Active Problems:    Diabetic neuropathy (HCC)    Chronic suprapubic catheter (HCC)    Esophageal reflux    Generalized anxiety disorder    Primary hypertension    Multiple sclerosis (HCC)    Urinary retention    Chronic diastolic congestive heart failure (HCC)    Constipation    Urinary tract infection associated with indwelling urethral catheter     Past Medical History  Past Medical History:   Diagnosis Date    Acute laryngitis     Acute nonsuppurative otitis media, unspecified laterality     Arm weakness     Arthritis     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HCC)     Bladder infection     Bronchitis     Constipation     Cough     Diabetes mellitus (HCC)     Dizziness     Dysfunction of eustachian tube     Erectile dysfunction of non-organic origin     Fatigue     Glaucoma     Hiatal hernia     Hypertension     Imbalance     Leg muscle spasm     MS (multiple sclerosis) (HCC)     Nephrolithiasis     Neurogenic bladder     No natural teeth     Sinus pain     Spinal stenosis     Strain of thoracic region     Stroke (HCC)     Suprapubic catheter (HCC)      Past Surgical History  Past Surgical History:   Procedure Laterality Date    APPENDECTOMY      BRAIN SURGERY      Coil placed in aneurysm    CYSTOSCOPY      CYSTOSCOPY      CYSTOSCOPY  06/11/2018    CYSTOSCOPY  01/15/2021    EYE SURGERY      transscleral cyclophotocoagulation noncontact YAG laser    MYRINGOTOMY      with ventilation tube insertion    UT LITHOLAPAXY SMPL/SM <2.5 CM N/A 5/7/2019    Procedure: CYSTOSCOPY, holmium laser litholapaxy of bladder stones, EXCHANGE OF SP TUBE;  Surgeon: Javy Jeffries MD;  [facility];  Service: Urology    SUPRAPUBIC CATHETER INSERTION           02/16/24 1030   OT Last Visit   OT Visit Date 02/16/24   Note Type   Note type Evaluation   Pain Assessment   Pain Assessment Tool 0-10  "  Pain Score No Pain   Restrictions/Precautions   Weight Bearing Precautions Per Order No   Other Precautions Cognitive;Chair Alarm;Bed Alarm;Contact/isolation;Fall Risk   Home Living   Type of Home House   Home Layout One level;Ramped entrance;Performs ADLs on one level;Able to live on main level with bedroom/bathroom   Bathroom Shower/Tub Walk-in shower  (Pt primarily sponge bathes at baseline)   Bathroom Toilet Standard   Bathroom Equipment Grab bars in shower;Shower chair;Grab bars around toilet;Commode   Bathroom Accessibility Accessible   Home Equipment Walker;Wheelchair-manual;Hospital bed;Cane;Other (Comment)  (Sit<>stand machine)   Additional Comments Pt is poor historian, info on home setup and PLOF obtained via chart and pt. Pt lives with his brother and sister in a one level house with ramped entrance. Previous notes indicate pt has HHA, however pt reports he no longer has HHAs, ? Accuracy. Pt goes to [employer] 1x/wk. Receiving HHPT/OT PTA.   Prior Function   Level of [place] Needs assistance with ADLs;Needs assistance with functional mobility;Needs assistance with IADLS   Lives With Family  (Brother, sister)   Receives Help From Family   IADLs Family/Friend/Other provides transportation;Family/Friend/Other provides meals;Family/Friend/Other provides medication management   Falls in the last 6 months 1 to 4  (3)   Vocational Retired   Comments At baseline, pt required assist w/ ADLs, IADLs, and functional transfers using sit<>stand machine. Pt initially reports non-ambulatory at baseline, however later reports he can take \"a few\" steps with home therapy. (-) . (+) falls PTA.   Lifestyle   Autonomy At baseline, pt required assist w/ ADLs, IADLs, and functional transfers using sit<>stand machine. Pt initially reports non-ambulatory at baseline, however later reports he can take \"a few\" steps with home therapy. (-) . (+) falls PTA.   Reciprocal Relationships Brother, sister   Service to " Others Retired   Intrinsic Gratification Watching TV   ADL   Where Assessed Edge of bed   Eating Assistance 5  Supervision/Setup   Grooming Assistance 5  Supervision/Setup   UB Bathing Assistance 4  Minimal Assistance   LB Bathing Assistance 2  Maximal Assistance   UB Dressing Assistance 4  Minimal Assistance   LB Dressing Assistance 2  Maximal Assistance   Toileting Assistance  2  Maximal Assistance   Functional Assistance 2  Maximal Assistance   Bed Mobility   Supine to Sit 3  Moderate assistance   Additional items Assist x 2;HOB elevated;Bedrails;Increased time required;Verbal cues;LE management   Sit to Supine 2  Maximal assistance   Additional items Assist x 2;Increased time required;Verbal cues;LE management   Additional Comments Pt lying supine with bed alarm activated at end of session. Call bell and phone within reach. All needs met and pt reports no further questions for OT at this time.   Transfers   Sit to Stand 2  Maximal assistance   Additional items Assist x 2;Increased time required;Verbal cues   Stand to Sit 2  Maximal assistance   Additional items Assist x 2;Increased time required;Verbal cues   Additional Comments Pt able to achieve ~75% of standing position with Max A of 2 and assist to block B/L knees. B/L HHA provided   Functional Mobility   Additional Comments N/A. Pt unable to achieve full sit>stand transfer during evaluation   Balance   Static Sitting [place] -   Dynamic Sitting Poor +   Static Standing Zero   Activity Tolerance   Activity Tolerance Patient limited by fatigue   Medical Staff Made Aware Bridger, PT   Nurse Made Aware yes; GYPSY [place]   RUE Assessment   RUE Assessment WFL  (4-/5 throughout)   LUE Assessment   LUE Assessment WFL  (4-/5 throughout)   Hand Function   Gross Motor Coordination Functional   Fine Motor Coordination Functional   Sensation   Light Touch No apparent deficits   Proprioception   Proprioception No apparent deficits   Vision-Basic Assessment   Visual History  "Glaucoma   Vision - Complex Assessment   Additional Comments Pt reports poor acuity and blurred vision at baseline. Pt able to accurately identify 3/3 digits held up by therapist   Perception   Inattention/Neglect Appears intact   Cognition   Overall Cognitive Status Impaired   Arousal/Participation Alert;Cooperative   Attention Attends with cues to redirect   Orientation Level Oriented to person;Oriented to place;Disoriented to time;Disoriented to situation   Memory Decreased recall of recent events;Decreased short term memory;Decreased recall of precautions   Following Commands Follows one step commands with increased time or repetition   Comments Pleasant. Inconsistent report of PLOF. Limited insight into deficits   Assessment   Limitation Decreased ADL status;Decreased UE strength;Decreased Safe judgement during ADL;Decreased cognition;Decreased endurance;Decreased self-care trans;Decreased high-level ADLs   Prognosis Fair   Assessment Pt is a 73 y.o. male seen for OT evaluation s/p adm to [facility] on [facility]/14/2024 w/ Stercoral colitis, UTI associated with indwelling urethral catheter. Comorbidities affecting pt’s functional performance include a significant PMH of MS, CVA, HTN, Arthritis, DM, Glaucoma, Neurogenic bladder, Spinal stenosis. Pt with active OT orders and activity orders for Up and OOB as tolerated. Pt is poor historian, info on home setup and PLOF obtained via chart and pt. Pt lives with his brother and sister in a one level house with ramped entrance. Previous notes indicate pt has HHA, however pt reports he no longer has HHAs, ? Accuracy. Pt goes to [facility] [employer] 1x/wk. Receiving HHPT/OT PTA. At baseline, pt required assist w/ ADLs, IADLs, and functional transfers using sit<>stand machine. Pt initially reports non-ambulatory at baseline, however later reports he can take \"a few\" steps with home therapy. (-) . (+) falls PTA. Upon evaluation, pt currently requires Min A for UB ADLs, Max " A for LB ADLs, Max A for toileting, Max-Mod A of 2 for bed mobility, and Max A of 2 for functional transfers 2* the following deficits impacting occupational performance: decreased strength , decreased balance, decreased activity tolerance, limited functional reach, impaired memory, impaired problem solving, decreased safety awareness, and decreased postural control. These impairments, as well at pt’s personal factors of: limited home support, difficulty performing ADLs, difficulty performing transfers/mobility, limited insight into deficits, fall risk , functional decline , and multiple admissions  limit pt’s ability to safely engage in all baseline areas of occupation. Pt to continue to benefit from continued acute OT services during hospital stay to address defined deficits and to maximize level of functional independence in the following Occupational Performance areas: eating, grooming, bathing/shower, toilet hygiene, dressing, health maintenance, functional mobility, community mobility, clothing management, and social participation. The patient's raw score on the -PAC Daily Activity Inpatient Short Form is 15. A raw score of less than 19 suggests the patient may benefit from discharge to post-acute rehabilitation services. Please refer to the recommendation of the Occupational Therapist for safe discharge planning. OT will continue to follow pt 2-5x/wk to address the following goals to  w/in 10-14 days:   Goals   Patient Goals To feel better   LTG Time Frame 10-14   Long Term Goal Please refer to LTGs listed below   Plan   Treatment Interventions ADL retraining;Functional transfer training;UE strengthening/ROM;Endurance training;Patient/family training;Equipment evaluation/education;Compensatory technique education;Continued evaluation;Activityengagement   Goal Expiration Date 24   OT Treatment Day 0   OT Frequency 2-3x/wk;3-5x/wk   Discharge Recommendation   Rehab Resource Intensity Level, OT II  (Moderate Resource Intensity)   AM-PAC Daily Activity Inpatient   Lower Body Dressing 2   Bathing 2   Toileting 2   Upper Body Dressing 3   Grooming 3   Eating 3   Daily Activity Raw Score 15   Daily Activity Standardized Score (Calc for Raw Score >=11) 34.69   AM-PAC Applied Cognition Inpatient   Following a Speech/Presentation 3   Understanding Ordinary Conversation 3   Taking Medications 1   Remembering Where Things Are Placed or Put Away 2   Remembering List of 4-5 Errands 2   Taking Care of Complicated Tasks 1   Applied Cognition Raw Score 12   Applied Cognition Standardized Score 28.82       GOALS    Pt will improve activity tolerance to G for min 30 min txment sessions for increase engagement in functional tasks    Pt will complete bed mobility at a Mod A level w/ G balance/safety demonstrated to decrease caregiver assistance required     Pt will complete UB dressing/self care w/ Supervision using adaptive device and DME as needed     Pt will complete LB dressing/self care w/ mod A using adaptive device and DME as needed    Pt will complete toileting w/ mod A w/ G hygiene/thoroughness using DME as needed    Pt will improve functional transfers to Mod A on/off all surfaces using DME as needed w/ G balance/safety     Pt will tolerate continued functional mobility assessment and appropriate goals will be established by OTR as applicable     Pt will be attentive 100% of the time during ongoing cognitive assessment w/ G participation to assist w/ safe d/c planning/recommendations    Pt will demonstrate G carryover of pt/caregiver education and training as appropriate w/o cues w/ good tolerance to increase safety during functional tasks    Pt will increase BUE strength by 1MM grade via AROM exercises to increase independence in ADLs and transfers    Pt will verbalize 3 potential fall hazards and identify appropriate compensatory techniques to decrease fall risk in home environment     Pt will increase standing  tolerance to 3-5 mins with Poor+ static standing balance to increase safety during participation in ADLs       Kary Cruz, OTR/L

## 2024-02-16 NOTE — UTILIZATION REVIEW
Notification of Unplanned, Urgent, or   Emergency Inpatient Admission   AUTHORIZATION REQUEST   [place]mitting Facility [place] [address]  Tax ID: [id number]  NPI: [id number]  Place of Service: Acute [facility] Hospital  Admission Level of Care: Inpatient  Place of Service Code: 21     Attending Physician Information  Attending Name and NPI#: Steve Bouchra  [[id number]]  Phone: [phone]     Admission Information  Inpatient Admission Date/Time: 2/15/24  1:16 AM  Discharge Date/Time: No discharge date for patient encounter.  Admitting Diagnosis Code/Description:  UTI (urinary tract infection) [N39.0]  Known medical problems [Z78.9]     Utilization Review Contact  Marsha Garcia Utilization   Phone: [phone]  Fax: [phone]  Email: [email or web address]  Contact for approvals/pending authorizations, clinical reviews, and discharge.     Physician Advisory Services Contact  Medical Necessity Denial & Xcxw-ms-Hhsc Discussion  Phone: [phone]  Fax: [phone]  Email: [email or web address]     DISCHARGE SUPPORT TEAM:  For Patients Discharge Needs & Updates  Phone: [phone] opt. 2 Fax: [phone]  Email: [email or web address]

## 2024-02-16 NOTE — PLAN OF CARE
"  Problem: OCCUPATIONAL THERAPY ADULT  Goal: Performs self-care activities at highest level of function for planned discharge setting.  See evaluation for individualized goals.  Description: Treatment Interventions: ADL retraining, Functional transfer training, UE strengthening/ROM, Endurance training, Patient/family training, Equipment evaluation/education, Compensatory technique education, Continued evaluation, Activityengagement          See flowsheet documentation for full assessment, interventions and recommendations.   Note: Limitation: Decreased ADL status, Decreased UE strength, Decreased Safe judgement during ADL, Decreased cognition, Decreased endurance, Decreased self-care trans, Decreased high-level ADLs  Prognosis: Fair  Assessment: Pt is a 73 y.o. male seen for OT evaluation s/p adm to [facility] on 2/14/2024 w/ Stercoral colitis, UTI associated with indwelling urethral catheter. Comorbidities affecting pt’s functional performance include a significant PMH of MS, CVA, HTN, Arthritis, DM, Glaucoma, Neurogenic bladder, Spinal stenosis. Pt with active OT orders and activity orders for Up and OOB as tolerated. Pt is poor historian, info on home setup and PLOF obtained via chart and pt. Pt lives with his brother and sister in a one level house with ramped entrance. Previous notes indicate pt has HHA, however pt reports he no longer has HHAs, ? Accuracy. [facility]. Receiving HHPT/OT PTA. At baseline, pt required assist w/ ADLs, IADLs, and functional transfers using sit<>stand machine. Pt initially reports non-ambulatory at baseline, however later reports he can take \"a few\" steps with home therapy. (-) . (+) falls PTA. Upon evaluation, pt currently requires Min A for UB ADLs, Max A for LB ADLs, Max A for toileting, Max-Mod A of 2 for bed mobility, and Max A of 2 for functional transfers 2* the following deficits impacting occupational performance: decreased strength , " decreased balance, decreased activity tolerance, limited functional reach, impaired memory, impaired problem solving, decreased safety awareness, and decreased postural control. These impairments, as well at pt’s personal factors of: limited home support, difficulty performing ADLs, difficulty performing transfers/mobility, limited insight into deficits, fall risk , functional decline , and multiple admissions  limit pt’s ability to safely engage in all baseline areas of occupation. Pt to continue to benefit from continued acute OT services during hospital stay to address defined deficits and to maximize level of functional independence in the following Occupational Performance areas: eating, grooming, bathing/shower, toilet hygiene, dressing, health maintenance, functional mobility, community mobility, clothing management, and social participation. The patient's raw score on the -PAC Daily Activity Inpatient Short Form is 15. A raw score of less than 19 suggests the patient may benefit from discharge to post-acute rehabilitation services. Please refer to the recommendation of the Occupational Therapist for safe discharge planning. OT will continue to follow pt 2-5x/wk to address the following goals to  w/in 10-14 days:     Rehab Resource Intensity Level, OT: II (Moderate Resource Intensity)

## 2024-02-16 NOTE — TELEPHONE ENCOUNTER
Called and spoke to patient. Patient confirmed upcoming apt with Karla Macias PA-C on 2/28/24 @ 10:30 am in the [place] [facility].

## 2024-02-16 NOTE — PLAN OF CARE
Problem: PAIN - ADULT  Goal: Verbalizes/displays adequate comfort level or baseline comfort level  Description: Interventions:  - Encourage patient to monitor pain and request assistance  - Assess pain using appropriate pain scale  - Administer analgesics based on type and severity of pain and evaluate response  - Implement non-pharmacological measures as appropriate and evaluate response  - Consider cultural and social influences on pain and pain management  - Notify physician/advanced practitioner if interventions unsuccessful or patient reports new pain  Outcome: Progressing     Problem: INFECTION - ADULT  Goal: Absence or prevention of progression during hospitalization  Description: INTERVENTIONS:  - Assess and monitor for signs and symptoms of infection  - Monitor lab/diagnostic results  - Monitor all insertion sites, i.e. indwelling lines, tubes, and drains  - Monitor endotracheal if appropriate and nasal secretions for changes in amount and color  - [place] appropriate cooling/warming therapies per order  - Administer medications as ordered  - Instruct and encourage patient and family to use good hand hygiene technique  - Identify and instruct in appropriate isolation precautions for identified infection/condition  Outcome: Progressing  Goal: Absence of fever/infection during neutropenic period  Description: INTERVENTIONS:  - Monitor WBC    Outcome: Progressing     Problem: SAFETY ADULT  Goal: Patient will remain free of falls  Description: INTERVENTIONS:  - Educate patient/family on patient safety including physical limitations  - Instruct patient to call for assistance with activity   - Consult OT/PT to assist with strengthening/mobility   - Keep Call bell within reach  - Keep bed low and locked with side rails adjusted as appropriate  - Keep care items and personal belongings within reach  - Initiate and maintain comfort rounds  - Make Fall Risk Sign visible to staff  - Offer Toileting every 2 Hours,  in advance of need  - Initiate/Maintain bed alarm  - Apply yellow socks and bracelet for high fall risk patients  - Consider moving patient to room near nurses station  Outcome: Progressing  Goal: Maintain or return to baseline ADL function  Description: INTERVENTIONS:  -  Assess patient's ability to carry out ADLs; assess patient's baseline for ADL function and identify physical deficits which impact ability to perform ADLs (bathing, care of mouth/teeth, toileting, grooming, dressing, etc.)  - Assess/evaluate cause of self-care deficits   - Assess range of motion  - Assess patient's mobility; develop plan if impaired  - Assess patient's need for assistive devices and provide as appropriate  - Encourage maximum independence but intervene and supervise when necessary  - Involve family in performance of ADLs  - Assess for home care needs following discharge   - Consider OT consult to assist with ADL evaluation and planning for discharge  - Provide patient education as appropriate  Outcome: Progressing  Goal: Maintains/Returns to pre admission functional level  Description: INTERVENTIONS:  - Perform AM-PAC 6 Click Basic Mobility/ Daily Activity assessment daily.  - Set and communicate daily mobility goal to care team and patient/family/caregiver.   - Collaborate with rehabilitation services on mobility goals if consulted  - Perform Range of Motion 3 times a day.  - Reposition patient every 2 hours.  - Dangle patient 3 times a day  - Stand patient 3 times a day  - Ambulate patient 3 times a day  - Out of bed to chair 3 times a day   - Out of bed for meals 3 times a day  - Out of bed for toileting  - Record patient progress and toleration of activity level   Outcome: Progressing     Problem: DISCHARGE PLANNING  Goal: Discharge to home or other facility with appropriate resources  Description: INTERVENTIONS:  - Identify barriers to discharge w/patient and caregiver  - Arrange for needed discharge resources and  transportation as appropriate  - Identify discharge learning needs (meds, wound care, etc.)  - Arrange for interpretive services to assist at discharge as needed  - Refer to Case Management Department for coordinating discharge planning if the patient needs post-hospital services based on physician/advanced practitioner order or complex needs related to functional status, cognitive ability, or social support system  Outcome: Progressing     Problem: Knowledge Deficit  Goal: Patient/family/caregiver demonstrates understanding of disease process, treatment plan, medications, and discharge instructions  Description: Complete learning assessment and assess knowledge base.  Interventions:  - Provide teaching at level of understanding  - Provide teaching via preferred learning methods  Outcome: Progressing     Problem: Prexisting or High Potential for Compromised Skin Integrity  Goal: Skin integrity is maintained or improved  Description: INTERVENTIONS:  - Identify patients at risk for skin breakdown  - Assess and monitor skin integrity  - Assess and monitor nutrition and hydration status  - Monitor labs   - Assess for incontinence   - Turn and reposition patient  - Assist with mobility/ambulation  - Relieve pressure over bony prominences  - Avoid friction and shearing  - Provide appropriate hygiene as needed including keeping skin clean and dry  - Evaluate need for skin moisturizer/barrier cream  - Collaborate with interdisciplinary team   - Patient/family teaching  - Consider wound care consult   Outcome: Progressing     Problem: Nutrition/Hydration-ADULT  Goal: Nutrient/Hydration intake appropriate for improving, restoring or maintaining nutritional needs  Description: Monitor and assess patient's nutrition/hydration status for malnutrition. Collaborate with interdisciplinary team and initiate plan and interventions as ordered.  Monitor patient's weight and dietary intake as ordered or per policy. Utilize nutrition  screening tool and intervene as necessary. Determine patient's food preferences and provide high-protein, high-caloric foods as appropriate.     INTERVENTIONS:  - Monitor oral intake, urinary output, labs, and treatment plans  - Assess nutrition and hydration status and recommend course of action  - Evaluate amount of meals eaten  - Assist patient with eating if necessary   - Allow adequate time for meals  - Recommend/ encourage appropriate diets, oral nutritional supplements, and vitamin/mineral supplements  - Order, calculate, and assess calorie counts as needed  - Recommend, monitor, and adjust tube feedings and TPN/PPN based on assessed needs  - Assess need for intravenous fluids  - Provide specific nutrition/hydration education as appropriate  - Include patient/family/caregiver in decisions related to nutrition  Outcome: Progressing

## 2024-02-17 LAB
ANION GAP SERPL CALCULATED.3IONS-SCNC: 7 MMOL/L
BACTERIA UR CULT: ABNORMAL
BACTERIA UR CULT: ABNORMAL
BUN SERPL-MCNC: 7 MG/DL (ref 5–25)
CALCIUM SERPL-MCNC: 9.4 MG/DL (ref 8.4–10.2)
CHLORIDE SERPL-SCNC: 104 MMOL/L (ref 96–108)
CO2 SERPL-SCNC: 26 MMOL/L (ref 21–32)
CREAT SERPL-MCNC: 0.71 MG/DL (ref 0.6–1.3)
ERYTHROCYTE [DISTWIDTH] IN BLOOD BY AUTOMATED COUNT: 15.4 % (ref 11.6–15.1)
GFR SERPL CREATININE-BSD FRML MDRD: 92 ML/MIN/1.73SQ M
GLUCOSE SERPL-MCNC: 112 MG/DL (ref 65–140)
GLUCOSE SERPL-MCNC: 131 MG/DL (ref 65–140)
GLUCOSE SERPL-MCNC: 134 MG/DL (ref 65–140)
GLUCOSE SERPL-MCNC: 136 MG/DL (ref 65–140)
GLUCOSE SERPL-MCNC: 149 MG/DL (ref 65–140)
HCT VFR BLD AUTO: 42 % (ref 36.5–49.3)
HGB BLD-MCNC: 14.2 G/DL (ref 12–17)
MCH RBC QN AUTO: 30.3 PG (ref 26.8–34.3)
MCHC RBC AUTO-ENTMCNC: 33.8 G/DL (ref 31.4–37.4)
MCV RBC AUTO: 90 FL (ref 82–98)
PLATELET # BLD AUTO: 339 THOUSANDS/UL (ref 149–390)
PMV BLD AUTO: 8.5 FL (ref 8.9–12.7)
POTASSIUM SERPL-SCNC: 3.7 MMOL/L (ref 3.5–5.3)
RBC # BLD AUTO: 4.68 MILLION/UL (ref 3.88–5.62)
SODIUM SERPL-SCNC: 137 MMOL/L (ref 135–147)
WBC # BLD AUTO: 10.48 THOUSAND/UL (ref 4.31–10.16)

## 2024-02-17 PROCEDURE — 99232 SBSQ HOSP IP/OBS MODERATE 35: CPT | Performed by: PHYSICIAN ASSISTANT

## 2024-02-17 PROCEDURE — 85027 COMPLETE CBC AUTOMATED: CPT

## 2024-02-17 PROCEDURE — 80048 BASIC METABOLIC PNL TOTAL CA: CPT

## 2024-02-17 PROCEDURE — 82948 REAGENT STRIP/BLOOD GLUCOSE: CPT

## 2024-02-17 RX ADMIN — AMLODIPINE BESYLATE 10 MG: 10 TABLET ORAL at 12:24

## 2024-02-17 RX ADMIN — HEPARIN SODIUM 5000 UNITS: 5000 INJECTION INTRAVENOUS; SUBCUTANEOUS at 05:46

## 2024-02-17 RX ADMIN — PANTOPRAZOLE SODIUM 40 MG: 40 TABLET, DELAYED RELEASE ORAL at 06:24

## 2024-02-17 RX ADMIN — POLYETHYLENE GLYCOL 3350 17 G: 17 POWDER, FOR SOLUTION ORAL at 09:30

## 2024-02-17 RX ADMIN — NYSTATIN: 100000 POWDER TOPICAL at 09:32

## 2024-02-17 RX ADMIN — PANTOPRAZOLE SODIUM 40 MG: 40 TABLET, DELAYED RELEASE ORAL at 18:42

## 2024-02-17 RX ADMIN — GABAPENTIN 300 MG: 300 CAPSULE ORAL at 09:32

## 2024-02-17 RX ADMIN — ATORVASTATIN CALCIUM 80 MG: 80 TABLET, FILM COATED ORAL at 09:32

## 2024-02-17 RX ADMIN — SENNOSIDES AND DOCUSATE SODIUM 2 TABLET: 8.6; 5 TABLET ORAL at 22:33

## 2024-02-17 RX ADMIN — HEPARIN SODIUM 5000 UNITS: 5000 INJECTION INTRAVENOUS; SUBCUTANEOUS at 13:55

## 2024-02-17 RX ADMIN — SERTRALINE HYDROCHLORIDE 25 MG: 25 TABLET ORAL at 22:33

## 2024-02-17 RX ADMIN — CLOPIDOGREL BISULFATE 75 MG: 75 TABLET ORAL at 09:31

## 2024-02-17 RX ADMIN — CEFTRIAXONE 1000 MG: 1 INJECTION, SOLUTION INTRAVENOUS at 00:47

## 2024-02-17 RX ADMIN — LATANOPROST 1 DROP: 50 SOLUTION OPHTHALMIC at 22:33

## 2024-02-17 RX ADMIN — GABAPENTIN 300 MG: 300 CAPSULE ORAL at 18:42

## 2024-02-17 RX ADMIN — HEPARIN SODIUM 5000 UNITS: 5000 INJECTION INTRAVENOUS; SUBCUTANEOUS at 22:33

## 2024-02-17 RX ADMIN — PROPRANOLOL HYDROCHLORIDE 60 MG: 60 CAPSULE, EXTENDED RELEASE ORAL at 09:30

## 2024-02-17 RX ADMIN — NYSTATIN 1 APPLICATION: 100000 POWDER TOPICAL at 18:42

## 2024-02-17 RX ADMIN — GABAPENTIN 600 MG: 300 CAPSULE ORAL at 22:32

## 2024-02-17 NOTE — ASSESSMENT & PLAN NOTE
Grossly positive UA.  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

## 2024-02-17 NOTE — PROGRESS NOTES
[facility]  Progress Note  Name: Mathew Rico I  MRN: [id number]  Unit/Bed#: E5 -01 I Date of Admission: 2/14/2024   Date of Service: 2/17/2024 I Hospital Day: 2    Assessment/Plan   * Stercoral colitis  Assessment & Plan  Presented with abdomen pain.   Ct abdomen shows fecal impaction.   Still endorsing abdominal discomfort, but passing gas.   Continue scheduled miralax, senna.   Had small bowel movement today   If does have another BM can consider glycolax bowel prep but will hold off for now    Chronic suprapubic catheter (HCC)  Assessment & Plan  Hx of Multiple sclerosis with chronic indwelling dela cruz catherer.   Hospitalized 2/12 presenting with suprapubic cath dysfunction - At home, cath unable to be passed.   Exchanged 2/12 by urology in ED and d/c home    Urinary tract infection associated with indwelling urethral catheter   Assessment & Plan  Grossly positive UA.  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   See stercoral colitis for further plan     Chronic diastolic congestive heart failure (HCC)  Assessment & Plan  Wt Readings from Last 3 Encounters:   02/17/24 64.2 kg (141 lb 8.6 oz)   02/12/24 69 kg (152 lb 1.9 oz)   12/12/23 70.6 kg (155 lb 10.3 oz)   Appears compensated  Monitor I/O and weights      Urinary retention  Assessment & Plan  Chronic due to MS    Multiple sclerosis (HCC)  Assessment & Plan  Continue neurontin    Primary hypertension  Assessment & Plan  Continue home propanolol 60 mg daily and norvasc 10 mg daily     Generalized anxiety disorder  Assessment & Plan  As needed xanax, and zoloft      Esophageal reflux  Assessment & Plan  Continue protonix    Diabetic neuropathy (HCC)  Assessment & Plan  Lab Results    Component Value Date    HGBA1C 8.1 (H) 2023     Recent Labs     24  1557 24  2224 24  0702 24  1145   POCGLU 128 138 136 112   On trulicity at home- held here   Continue sliding scale         VTE Pharmacologic Prophylaxis:   Pharmacologic: Heparin  Mechanical VTE Prophylaxis in Place: Yes    AM-PAC Basic Mobility:  Basic Mobility Inpatient Raw Score: 7  -HL Achieved: 2: Bed activities/Dependent transfer  -HL Goal: 2: Bed activities/Dependent transfer    Discharge Plan: with need for continued inpatient stay for STR     Discussions with Specialists or Other Care Team Provider: nursing    Education and Discussions with Family / Patient: patient    Time Spent for Care: This time was spent on one or more of the following: performing physical exam; counseling and coordination of care; obtaining or reviewing history; documenting in the medical record; reviewing/ordering tests, medications, or procedures; communicating with other healthcare professionals and discussing with patient's family/caregivers.    Current Length of Stay: 2 day(s)  Current Patient Status: Inpatient   Code Status: Level 1 - Full Code    Subjective:   Resting in bed talking to brother upon my arrival. Feels okay but overall weak. Agreeable to STR.     Objective:     Vitals:   Temp (24hrs), Av.5 °F (36.9 °C), Min:98.3 °F (36.8 °C), Max:98.7 °F (37.1 °C)    Temp:  [98.3 °F (36.8 °C)-98.7 °F (37.1 °C)] 98.7 °F (37.1 °C)  HR:  [] 90  Resp:  [18] 18  BP: (105-144)/(60-79) 144/79  SpO2:  [94 %-95 %] 95 %  Body mass index is 22.17 kg/m².     Input and Output Summary (last 24 hours):       Intake/Output Summary (Last 24 hours) at 2024 1557  Last data filed at 2024 1329  Gross per 24 hour   Intake 300 ml   Output 1500 ml   Net -1200 ml       Physical Exam:     Physical Exam  Vitals and nursing note reviewed.   Constitutional:       Appearance: He is normal weight. He is ill-appearing. He is not  toxic-appearing.   HENT:      Head: Normocephalic and atraumatic.   Eyes:      General: No scleral icterus.  Cardiovascular:      Rate and Rhythm: Normal rate and regular rhythm.   Pulmonary:      Effort: Pulmonary effort is normal.      Breath sounds: Normal breath sounds.   Abdominal:      General: Bowel sounds are normal. There is no distension.      Palpations: Abdomen is soft. There is no mass.      Tenderness: There is no abdominal tenderness.      Comments: Suprapubic cath in place   Musculoskeletal:         General: No swelling.      Cervical back: Neck supple.   Skin:     General: Skin is warm and dry.   Neurological:      Mental Status: He is oriented to person, place, and time. Mental status is at baseline.   Psychiatric:         Mood and Affect: Mood normal.         Additional Data:     Labs:    Results from last 7 days   Lab Units 02/17/24  0438 02/16/24  0431   WBC Thousand/uL 10.48* 11.07*   HEMOGLOBIN g/dL 14.2 13.9   HEMATOCRIT % 42.0 41.8   PLATELETS Thousands/uL 339 316   NEUTROS PCT %  --  45   LYMPHS PCT %  --  37   MONOS PCT %  --  7   EOS PCT %  --  9*     Results from last 7 days   Lab Units 02/17/24  0438 02/16/24  0431 02/14/24  2314   POTASSIUM mmol/L 3.7   < > 4.0   CHLORIDE mmol/L 104   < > 99   CO2 mmol/L 26   < > 27   BUN mg/dL 7   < > 13   CREATININE mg/dL 0.71   < > 0.74   CALCIUM mg/dL 9.4   < > 10.2   ALK PHOS U/L  --   --  78   ALT U/L  --   --  14   AST U/L  --   --  16    < > = values in this interval not displayed.           * I Have Reviewed All Lab Data Listed Above.  * Additional Pertinent Lab Tests Reviewed:     Imaging:    Imaging Reports Reviewed Today Include:   Imaging Personally Reviewed by Myself Includes:      Recent Cultures (last 7 days):     Results from last 7 days   Lab Units 02/15/24  0131 02/14/24  2316   BLOOD CULTURE  No Growth at 48 hrs.  No Growth at 48 hrs.  --    URINE CULTURE   --  80,000-89,000 cfu/ml Klebsiella pneumoniae*  60,000-69,000 cfu/ml  Enterobacter cloacae*       Lines/Drains:  Invasive Devices       Peripheral Intravenous Line  Duration             Peripheral IV 02/15/24 Dorsal (posterior);Right Forearm 2 days              Drain  Duration             Suprapubic Catheter 16 Fr. 4 days                    Last 24 Hours Medication List:   Current Facility-Administered Medications   Medication Dose Route Frequency Provider Last Rate    acetaminophen  650 mg Oral Q6H PRN Petra Luong MD      albuterol  2.5 mg Nebulization Q6H PRN Petra Luong MD      ALPRAZolam  0.25 mg Oral BID PRN Petra Luong MD      amLODIPine  10 mg Oral Daily Petra Luong MD      And    atorvastatin  80 mg Oral Daily Petra Luong MD      bisacodyl  10 mg Rectal Daily PRN Nga Sawant PA-C      cefTRIAXone  1,000 mg Intravenous Q24H Petra Luong MD 1,000 mg (02/17/24 0047)    clopidogrel  75 mg Oral Daily Petra Luong MD      gabapentin  300 mg Oral BID Petra Luong MD      gabapentin  600 mg Oral HS Petra Luong MD      heparin (porcine)  5,000 Units Subcutaneous Q8H CIERA Petra Luong MD      insulin lispro  1-5 Units Subcutaneous TID AC Petra Luong MD      insulin lispro  1-5 Units Subcutaneous HS Petra Luong MD      latanoprost  1 drop Both Eyes HS Petra Luong MD      melatonin  3 mg Oral HS PRN Petra Luong MD      nystatin   Topical BID Petra Luong MD      ondansetron  4 mg Intravenous Q6H PRN Petra Luong MD      pantoprazole  40 mg Oral BID AC Petra Luong MD      polyethylene glycol  17 g Oral Daily Nga Sawant PA-C      propranolol  60 mg Oral Daily Petra Luong MD      senna-docusate sodium  2 tablet Oral HS Petra Luong MD      sertraline  25 mg Oral HS Petra L Cherise, MD      witch hazel-glycerin  1 Pad Topical Q4H PRN Petra Luong MD          Today, Patient Was Seen By: Lauren Rangel PA-C    ** Please Note: This note has been constructed using a voice  recognition system. **

## 2024-02-17 NOTE — ASSESSMENT & PLAN NOTE
Presented with abdomen pain.   Ct abdomen shows fecal impaction.   Still endorsing abdominal discomfort, but passing gas.   Continue scheduled miralax, senna.   Had small bowel movement today   If does have another BM can consider glycolax bowel prep but will hold off for now

## 2024-02-17 NOTE — PLAN OF CARE
Problem: INFECTION - ADULT  Goal: Absence or prevention of progression during hospitalization  Description: INTERVENTIONS:  - Assess and monitor for signs and symptoms of infection  - Monitor lab/diagnostic results  - Monitor all insertion sites, i.e. indwelling lines, tubes, and drains  - Monitor endotracheal if appropriate and nasal secretions for changes in amount and color  - [place] appropriate cooling/warming therapies per order  - Administer medications as ordered  - Instruct and encourage patient and family to use good hand hygiene technique  - Identify and instruct in appropriate isolation precautions for identified infection/condition  Outcome: Progressing     Problem: SAFETY ADULT  Goal: Patient will remain free of falls  Description: INTERVENTIONS:  - Educate patient/family on patient safety including physical limitations  - Instruct patient to call for assistance with activity   - Consult OT/PT to assist with strengthening/mobility   - Keep Call bell within reach  - Keep bed low and locked with side rails adjusted as appropriate  - Keep care items and personal belongings within reach  - Initiate and maintain comfort rounds  - Make Fall Risk Sign visible to staff  - Offer Toileting every  Hours, in advance of need  - Initiate/Maintain alarm  - Obtain necessary fall risk management equipment:   - Apply yellow socks and bracelet for high fall risk patients  - Consider moving patient to room near nurses station  Outcome: Progressing  Goal: Maintain or return to baseline ADL function  Description: INTERVENTIONS:  -  Assess patient's ability to carry out ADLs; assess patient's baseline for ADL function and identify physical deficits which impact ability to perform ADLs (bathing, care of mouth/teeth, toileting, grooming, dressing, etc.)  - Assess/evaluate cause of self-care deficits   - Assess range of motion  - Assess patient's mobility; develop plan if impaired  - Assess patient's need for assistive  devices and provide as appropriate  - Encourage maximum independence but intervene and supervise when necessary  - Involve family in performance of ADLs  - Assess for home care needs following discharge   - Consider OT consult to assist with ADL evaluation and planning for discharge  - Provide patient education as appropriate  Outcome: Progressing  Goal: Maintains/Returns to pre admission functional level  Description: INTERVENTIONS:  - Perform AM-PAC 6 Click Basic Mobility/ Daily Activity assessment daily.  - Set and communicate daily mobility goal to care team and patient/family/caregiver.   - Collaborate with rehabilitation services on mobility goals if consulted  - Perform Range of Motion times a day.  - Reposition patient every  hours.  - Dangle patient  times a day  - Stand patient  times a day  - Ambulate patient  times a day  - Out of bed to chair  times a day   - Out of bed for meals  times a day  - Out of bed for toileting  - Record patient progress and toleration of activity level   Outcome: Progressing     Problem: DISCHARGE PLANNING  Goal: Discharge to home or other facility with appropriate resources  Description: INTERVENTIONS:  - Identify barriers to discharge w/patient and caregiver  - Arrange for needed discharge resources and transportation as appropriate  - Identify discharge learning needs (meds, wound care, etc.)  - Arrange for interpretive services to assist at discharge as needed  - Refer to Case Management Department for coordinating discharge planning if the patient needs post-hospital services based on physician/advanced practitioner order or complex needs related to functional status, cognitive ability, or social support system  Outcome: Progressing     Problem: Knowledge Deficit  Goal: Patient/family/caregiver demonstrates understanding of disease process, treatment plan, medications, and discharge instructions  Description: Complete learning assessment and assess knowledge  base.  Interventions:  - Provide teaching at level of understanding  - Provide teaching via preferred learning methods  Outcome: Progressing     Problem: Prexisting or High Potential for Compromised Skin Integrity  Goal: Skin integrity is maintained or improved  Description: INTERVENTIONS:  - Identify patients at risk for skin breakdown  - Assess and monitor skin integrity  - Assess and monitor nutrition and hydration status  - Monitor labs   - Assess for incontinence   - Turn and reposition patient  - Assist with mobility/ambulation  - Relieve pressure over bony prominences  - Avoid friction and shearing  - Provide appropriate hygiene as needed including keeping skin clean and dry  - Evaluate need for skin moisturizer/barrier cream  - Collaborate with interdisciplinary team   - Patient/family teaching  - Consider wound care consult   Outcome: Progressing     Problem: Nutrition/Hydration-ADULT  Goal: Nutrient/Hydration intake appropriate for improving, restoring or maintaining nutritional needs  Description: Monitor and assess patient's nutrition/hydration status for malnutrition. Collaborate with interdisciplinary team and initiate plan and interventions as ordered.  Monitor patient's weight and dietary intake as ordered or per policy. Utilize nutrition screening tool and intervene as necessary. Determine patient's food preferences and provide high-protein, high-caloric foods as appropriate.     INTERVENTIONS:  - Monitor oral intake, urinary output, labs, and treatment plans  - Assess nutrition and hydration status and recommend course of action  - Evaluate amount of meals eaten  - Assist patient with eating if necessary   - Allow adequate time for meals  - Recommend/ encourage appropriate diets, oral nutritional supplements, and vitamin/mineral supplements  - Order, calculate, and assess calorie counts as needed  - Recommend, monitor, and adjust tube feedings and TPN/PPN based on assessed needs  - Assess need for  intravenous fluids  - Provide specific nutrition/hydration education as appropriate  - Include patient/family/caregiver in decisions related to nutrition  Outcome: Progressing

## 2024-02-17 NOTE — ASSESSMENT & PLAN NOTE
Hx of Multiple sclerosis with chronic indwelling dela cruz catherer.   Hospitalized 2/12 presenting with suprapubic cath dysfunction - At home, cath unable to be passed.   Exchanged 2/12 by urology in ED and d/c home

## 2024-02-17 NOTE — ASSESSMENT & PLAN NOTE
Lab Results   Component Value Date    HGBA1C 8.1 (H) 12/04/2023     Recent Labs     02/16/24  1557 02/16/24  2224 02/17/24  0702 02/17/24  1145   POCGLU 128 138 136 112   On trulicity at home- held here   Continue sliding scale

## 2024-02-17 NOTE — ASSESSMENT & PLAN NOTE
Wt Readings from Last 3 Encounters:   02/17/24 64.2 kg (141 lb 8.6 oz)   02/12/24 69 kg (152 lb 1.9 oz)   12/12/23 70.6 kg (155 lb 10.3 oz)   Appears compensated  Monitor I/O and weights

## 2024-02-17 NOTE — PLAN OF CARE
Problem: PAIN - ADULT  Goal: Verbalizes/displays adequate comfort level or baseline comfort level  Description: Interventions:  - Encourage patient to monitor pain and request assistance  - Assess pain using appropriate pain scale  - Administer analgesics based on type and severity of pain and evaluate response  - Implement non-pharmacological measures as appropriate and evaluate response  - Consider cultural and social influences on pain and pain management  - Notify physician/advanced practitioner if interventions unsuccessful or patient reports new pain  Outcome: Progressing     Problem: INFECTION - ADULT  Goal: Absence or prevention of progression during hospitalization  Description: INTERVENTIONS:  - Assess and monitor for signs and symptoms of infection  - Monitor lab/diagnostic results  - Monitor all insertion sites, i.e. indwelling lines, tubes, and drains  - Monitor endotracheal if appropriate and nasal secretions for changes in amount and color  - [place] appropriate cooling/warming therapies per order  - Administer medications as ordered  - Instruct and encourage patient and family to use good hand hygiene technique  - Identify and instruct in appropriate isolation precautions for identified infection/condition  Outcome: Progressing  Goal: Absence of fever/infection during neutropenic period  Description: INTERVENTIONS:  - Monitor WBC    Outcome: Progressing     Problem: SAFETY ADULT  Goal: Patient will remain free of falls  Description: INTERVENTIONS:  - Educate patient/family on patient safety including physical limitations  - Instruct patient to call for assistance with activity   - Consult OT/PT to assist with strengthening/mobility   - Keep Call bell within reach  - Keep bed low and locked with side rails adjusted as appropriate  - Keep care items and personal belongings within reach  - Initiate and maintain comfort rounds  - Make Fall Risk Sign visible to staff  - Offer Toileting every 2 Hours,  in advance of need  - Initiate/Maintain bed alarm  - Obtain necessary fall risk management equipment: call bell in reach and nonskid footwear on  - Apply yellow socks and bracelet for high fall risk patients  - Consider moving patient to room near nurses station  Outcome: Progressing  Goal: Maintain or return to baseline ADL function  Description: INTERVENTIONS:  -  Assess patient's ability to carry out ADLs; assess patient's baseline for ADL function and identify physical deficits which impact ability to perform ADLs (bathing, care of mouth/teeth, toileting, grooming, dressing, etc.)  - Assess/evaluate cause of self-care deficits   - Assess range of motion  - Assess patient's mobility; develop plan if impaired  - Assess patient's need for assistive devices and provide as appropriate  - Encourage maximum independence but intervene and supervise when necessary  - Involve family in performance of ADLs  - Assess for home care needs following discharge   - Consider OT consult to assist with ADL evaluation and planning for discharge  - Provide patient education as appropriate  Outcome: Progressing  Goal: Maintains/Returns to pre admission functional level  Description: INTERVENTIONS:  - Perform AM-PAC 6 Click Basic Mobility/ Daily Activity assessment daily.  - Set and communicate daily mobility goal to care team and patient/family/caregiver.   - Collaborate with rehabilitation services on mobility goals if consulted  - Perform Range of Motion 3 times a day.  - Reposition patient every 2 hours.  - Dangle patient 3 times a day  - Stand patient 3 times a day  - Ambulate patient 3 times a day  - Out of bed to chair 3 times a day   - Out of bed for meals 3 times a day  - Out of bed for toileting  - Record patient progress and toleration of activity level   Outcome: Progressing     Problem: DISCHARGE PLANNING  Goal: Discharge to home or other facility with appropriate resources  Description: INTERVENTIONS:  - Identify barriers  to discharge w/patient and caregiver  - Arrange for needed discharge resources and transportation as appropriate  - Identify discharge learning needs (meds, wound care, etc.)  - Arrange for interpretive services to assist at discharge as needed  - Refer to Case Management Department for coordinating discharge planning if the patient needs post-hospital services based on physician/advanced practitioner order or complex needs related to functional status, cognitive ability, or social support system  Outcome: Progressing     Problem: Knowledge Deficit  Goal: Patient/family/caregiver demonstrates understanding of disease process, treatment plan, medications, and discharge instructions  Description: Complete learning assessment and assess knowledge base.  Interventions:  - Provide teaching at level of understanding  - Provide teaching via preferred learning methods  Outcome: Progressing     Problem: Prexisting or High Potential for Compromised Skin Integrity  Goal: Skin integrity is maintained or improved  Description: INTERVENTIONS:  - Identify patients at risk for skin breakdown  - Assess and monitor skin integrity  - Assess and monitor nutrition and hydration status  - Monitor labs   - Assess for incontinence   - Turn and reposition patient  - Assist with mobility/ambulation  - Relieve pressure over bony prominences  - Avoid friction and shearing  - Provide appropriate hygiene as needed including keeping skin clean and dry  - Evaluate need for skin moisturizer/barrier cream  - Collaborate with interdisciplinary team   - Patient/family teaching  - Consider wound care consult   Outcome: Progressing     Problem: Nutrition/Hydration-ADULT  Goal: Nutrient/Hydration intake appropriate for improving, restoring or maintaining nutritional needs  Description: Monitor and assess patient's nutrition/hydration status for malnutrition. Collaborate with interdisciplinary team and initiate plan and interventions as ordered.  Monitor  patient's weight and dietary intake as ordered or per policy. Utilize nutrition screening tool and intervene as necessary. Determine patient's food preferences and provide high-protein, high-caloric foods as appropriate.     INTERVENTIONS:  - Monitor oral intake, urinary output, labs, and treatment plans  - Assess nutrition and hydration status and recommend course of action  - Evaluate amount of meals eaten  - Assist patient with eating if necessary   - Allow adequate time for meals  - Recommend/ encourage appropriate diets, oral nutritional supplements, and vitamin/mineral supplements  - Order, calculate, and assess calorie counts as needed  - Recommend, monitor, and adjust tube feedings and TPN/PPN based on assessed needs  - Assess need for intravenous fluids  - Provide specific nutrition/hydration education as appropriate  - Include patient/family/caregiver in decisions related to nutrition  Outcome: Progressing

## 2024-02-18 LAB
GLUCOSE SERPL-MCNC: 160 MG/DL (ref 65–140)
GLUCOSE SERPL-MCNC: 166 MG/DL (ref 65–140)
GLUCOSE SERPL-MCNC: 174 MG/DL (ref 65–140)
GLUCOSE SERPL-MCNC: 186 MG/DL (ref 65–140)

## 2024-02-18 PROCEDURE — 82948 REAGENT STRIP/BLOOD GLUCOSE: CPT

## 2024-02-18 PROCEDURE — 99232 SBSQ HOSP IP/OBS MODERATE 35: CPT | Performed by: PHYSICIAN ASSISTANT

## 2024-02-18 RX ADMIN — NYSTATIN: 100000 POWDER TOPICAL at 17:04

## 2024-02-18 RX ADMIN — INSULIN LISPRO 1 UNITS: 100 INJECTION, SOLUTION INTRAVENOUS; SUBCUTANEOUS at 12:25

## 2024-02-18 RX ADMIN — AMLODIPINE BESYLATE 10 MG: 10 TABLET ORAL at 08:04

## 2024-02-18 RX ADMIN — INSULIN LISPRO 1 UNITS: 100 INJECTION, SOLUTION INTRAVENOUS; SUBCUTANEOUS at 08:00

## 2024-02-18 RX ADMIN — GABAPENTIN 300 MG: 300 CAPSULE ORAL at 17:04

## 2024-02-18 RX ADMIN — LATANOPROST 1 DROP: 50 SOLUTION OPHTHALMIC at 22:09

## 2024-02-18 RX ADMIN — GABAPENTIN 600 MG: 300 CAPSULE ORAL at 22:10

## 2024-02-18 RX ADMIN — HEPARIN SODIUM 5000 UNITS: 5000 INJECTION INTRAVENOUS; SUBCUTANEOUS at 14:31

## 2024-02-18 RX ADMIN — NYSTATIN: 100000 POWDER TOPICAL at 08:04

## 2024-02-18 RX ADMIN — CLOPIDOGREL BISULFATE 75 MG: 75 TABLET ORAL at 08:04

## 2024-02-18 RX ADMIN — PANTOPRAZOLE SODIUM 40 MG: 40 TABLET, DELAYED RELEASE ORAL at 16:35

## 2024-02-18 RX ADMIN — HEPARIN SODIUM 5000 UNITS: 5000 INJECTION INTRAVENOUS; SUBCUTANEOUS at 22:10

## 2024-02-18 RX ADMIN — INSULIN LISPRO 1 UNITS: 100 INJECTION, SOLUTION INTRAVENOUS; SUBCUTANEOUS at 22:09

## 2024-02-18 RX ADMIN — HEPARIN SODIUM 5000 UNITS: 5000 INJECTION INTRAVENOUS; SUBCUTANEOUS at 06:04

## 2024-02-18 RX ADMIN — ATORVASTATIN CALCIUM 80 MG: 80 TABLET, FILM COATED ORAL at 08:04

## 2024-02-18 RX ADMIN — POLYETHYLENE GLYCOL 3350 17 G: 17 POWDER, FOR SOLUTION ORAL at 08:04

## 2024-02-18 RX ADMIN — PROPRANOLOL HYDROCHLORIDE 60 MG: 60 CAPSULE, EXTENDED RELEASE ORAL at 08:05

## 2024-02-18 RX ADMIN — SERTRALINE HYDROCHLORIDE 25 MG: 25 TABLET ORAL at 22:10

## 2024-02-18 RX ADMIN — ONDANSETRON 4 MG: 2 INJECTION INTRAMUSCULAR; INTRAVENOUS at 10:04

## 2024-02-18 RX ADMIN — INSULIN LISPRO 1 UNITS: 100 INJECTION, SOLUTION INTRAVENOUS; SUBCUTANEOUS at 16:35

## 2024-02-18 RX ADMIN — GABAPENTIN 300 MG: 300 CAPSULE ORAL at 08:04

## 2024-02-18 RX ADMIN — CEFTRIAXONE 1000 MG: 1 INJECTION, SOLUTION INTRAVENOUS at 00:52

## 2024-02-18 RX ADMIN — PANTOPRAZOLE SODIUM 40 MG: 40 TABLET, DELAYED RELEASE ORAL at 06:04

## 2024-02-18 NOTE — ASSESSMENT & PLAN NOTE
Lab Results   Component Value Date    HGBA1C 8.1 (H) 12/04/2023     Recent Labs     02/17/24  1630 02/17/24  2153 02/18/24  0737 02/18/24  1102   POCGLU 149* 134 166* 160*   On trulicity at home- held here   Continue sliding scale

## 2024-02-18 NOTE — ASSESSMENT & PLAN NOTE
Presented with abdomen pain.   Ct abdomen shows increased stool within the visualized colon, additionally rectal vault with hard stool and thickening of the rectal wall suggestive of sterile coral colitis  Continue scheduled miralax, senna.  Noted bowel movements overnight but still with abdominal discomfort/distention  Advised nursing to given prn suppository   Could consider golytly prep if needed vs GI consult if not improving

## 2024-02-18 NOTE — PROGRESS NOTES
[facility]  Progress Note  Name: Mathew Rico I  MRN: [id number]  Unit/Bed#: E5 -01 I Date of Admission: 2/14/2024   Date of Service: 2/18/2024 I Hospital Day: 3    Assessment/Plan   * Stercoral colitis  Assessment & Plan  Presented with abdomen pain.   Ct abdomen shows increased stool within the visualized colon, additionally rectal vault with hard stool and thickening of the rectal wall suggestive of sterile coral colitis  Continue scheduled miralax, senna.  Noted bowel movements overnight but still with abdominal discomfort/distention  Advised nursing to given prn suppository   Could consider golytly prep if needed vs GI consult if not improving    Chronic suprapubic catheter (HCC)  Assessment & Plan  Hx of Multiple sclerosis with chronic indwelling dela cruz catherer.   Hospitalized 2/12 presenting with suprapubic cath dysfunction - At home, cath unable to be passed.   Exchanged 2/12 by urology in ED and discharged home  Represented back to ED on 2/14 with complaint of abdominal pain   Cath was draining up until today   Had nursing bladder scan and patient has close to 300 cc in bladder   Will attempt to irrigate - may need urology consult if unsuccessful    Urinary tract infection associated with indwelling urethral catheter   Assessment & Plan  UA with leukocytes, WBC, bacteria   Urine culture >100,000 kleb pneumoniae and enterobacter cloace   Suspect enterobacter contaminate as pt has improved without coverage of organism  Completed 3 days IV ceftriaxone   BC negative at 72    Constipation  Assessment & Plan  Continue scheduled miralax, senna  See stercoral colitis for further plan    Chronic diastolic congestive heart failure (HCC)  Assessment & Plan  Wt Readings from Last 3 Encounters:   02/18/24 65.1 kg (143 lb 8.3 oz)   02/12/24 69 kg (152 lb 1.9 oz)   12/12/23 70.6 kg (155 lb 10.3 oz)   Appears compensated  Not on any diuretics  Monitor I/O and weights    Urinary  retention  Assessment & Plan  Chronic due to MS with chronic suprapubic tube in place    Multiple sclerosis (HCC)  Assessment & Plan  Continue neurontin 300 mg BID    Primary hypertension  Assessment & Plan  Continue home propanolol 60 mg daily and norvasc 10 mg daily     Generalized anxiety disorder  Assessment & Plan  As needed xanax, and zoloft      Esophageal reflux  Assessment & Plan  Continue protonix    Diabetic neuropathy (HCC)  Assessment & Plan  Lab Results   Component Value Date    HGBA1C 8.1 (H) 2023     Recent Labs     24  1630 24  2153 24  0737 24  1102   POCGLU 149* 134 166* 160*   On trulicity at home- held here   Continue sliding scale         VTE Pharmacologic Prophylaxis:   Pharmacologic: Heparin  Mechanical VTE Prophylaxis in Place: Yes    AM-PAC Basic Mobility:  Basic Mobility Inpatient Raw Score: 7  -[facility] Achieved: 2: Bed activities/Dependent transfer  -[facility] Goal: 2: Bed activities/Dependent transfer    Discharge Plan: With need for continued inpatient stay for constipation    Discussions with Specialists or Other Care Team Provider: Nursing, urology    Education and Discussions with Family / Patient: Patient    Time Spent for Care: This time was spent on one or more of the following: performing physical exam; counseling and coordination of care; obtaining or reviewing history; documenting in the medical record; reviewing/ordering tests, medications, or procedures; communicating with other healthcare professionals and discussing with patient's family/caregivers.    Current Length of Stay: 3 day(s)  Current Patient Status: Inpatient   Code Status: Level 1 - Full Code    Subjective:   Patient resting in bed.  He complains of abdominal pain and slight distention today.  Ongoing bowel movements.  Continue bowel regimen.  Issue with suprapubic catheter and drainage today.    Objective:     Vitals:   Temp (24hrs), Av.6 °F (37 °C), Min:98.4 °F (36.9 °C), Max:98.9 °F  (37.2 °C)    Temp:  [98.4 °F (36.9 °C)-98.9 °F (37.2 °C)] 98.9 °F (37.2 °C)  HR:  [80-97] 97  Resp:  [18] 18  BP: ()/(56-71) 138/71  SpO2:  [91 %-95 %] 94 %  Body mass index is 22.48 kg/m².     Input and Output Summary (last 24 hours):       Intake/Output Summary (Last 24 hours) at 2/18/2024 1342  Last data filed at 2/18/2024 1330  Gross per 24 hour   Intake 120 ml   Output 1300 ml   Net -1180 ml       Physical Exam:     Physical Exam  Vitals and nursing note reviewed.   Constitutional:       General: He is not in acute distress.     Appearance: He is not ill-appearing, toxic-appearing or diaphoretic.   HENT:      Head: Normocephalic and atraumatic.   Eyes:      General: No scleral icterus.  Cardiovascular:      Rate and Rhythm: Normal rate and regular rhythm.   Pulmonary:      Effort: Pulmonary effort is normal. No respiratory distress.      Breath sounds: Normal breath sounds. No stridor. No wheezing or rhonchi.   Abdominal:      General: Bowel sounds are normal. There is no distension.      Palpations: Abdomen is soft. There is no mass.      Tenderness: There is no abdominal tenderness.      Hernia: No hernia is present.   Musculoskeletal:         General: No swelling.      Cervical back: Neck supple.   Skin:     General: Skin is warm and dry.   Neurological:      Mental Status: He is alert and oriented to person, place, and time. Mental status is at baseline.   Psychiatric:         Mood and Affect: Mood normal.         Behavior: Behavior normal.         Additional Data:     Labs:    Results from last 7 days   Lab Units 02/17/24  0438 02/16/24  0431   WBC Thousand/uL 10.48* 11.07*   HEMOGLOBIN g/dL 14.2 13.9   HEMATOCRIT % 42.0 41.8   PLATELETS Thousands/uL 339 316   NEUTROS PCT %  --  45   LYMPHS PCT %  --  37   MONOS PCT %  --  7   EOS PCT %  --  9*     Results from last 7 days   Lab Units 02/17/24  0438 02/16/24  0431 02/14/24  2314   POTASSIUM mmol/L 3.7   < > 4.0   CHLORIDE mmol/L 104   < > 99   CO2  mmol/L 26   < > 27   BUN mg/dL 7   < > 13   CREATININE mg/dL 0.71   < > 0.74   CALCIUM mg/dL 9.4   < > 10.2   ALK PHOS U/L  --   --  78   ALT U/L  --   --  14   AST U/L  --   --  16    < > = values in this interval not displayed.           * I Have Reviewed All Lab Data Listed Above.  * Additional Pertinent Lab Tests Reviewed: All Labs For Current Hospital Admission Reviewed    Imaging:    Imaging Reports Reviewed Today Include:   Imaging Personally Reviewed by Myself Includes:      Recent Cultures (last 7 days):     Results from last 7 days   Lab Units 02/15/24  0131 02/14/24  2316   BLOOD CULTURE  No Growth at 72 hrs.  No Growth at 72 hrs.  --    URINE CULTURE   --  80,000-89,000 cfu/ml Klebsiella pneumoniae*  60,000-69,000 cfu/ml Enterobacter cloacae*       Lines/Drains:  Invasive Devices       Peripheral Intravenous Line  Duration             Peripheral IV 02/18/24 Dorsal (posterior);Right Forearm <1 day              Drain  Duration             Suprapubic Catheter 16 Fr. 5 days                    Last 24 Hours Medication List:   Current Facility-Administered Medications   Medication Dose Route Frequency Provider Last Rate    acetaminophen  650 mg Oral Q6H PRN Petra Luong MD      albuterol  2.5 mg Nebulization Q6H PRN Petra Luong MD      ALPRAZolam  0.25 mg Oral BID PRN Petra Luong MD      amLODIPine  10 mg Oral Daily Petra Luong MD      And    atorvastatin  80 mg Oral Daily Petra Luong MD      bisacodyl  10 mg Rectal Daily PRN Nga Sawant PA-C      cefTRIAXone  1,000 mg Intravenous Q24H Petra Luong MD 1,000 mg (02/18/24 0052)    clopidogrel  75 mg Oral Daily Petra Luong MD      gabapentin  300 mg Oral BID Petra Luong MD      gabapentin  600 mg Oral HS Petra Luong MD      heparin (porcine)  5,000 Units Subcutaneous Q8H CIERA Petra Luong MD      insulin lispro  1-5 Units Subcutaneous TID AC Petra Luong MD      insulin lispro  1-5 Units  Subcutaneous HS Petra Luong MD      latanoprost  1 drop Both Eyes HS Petra Luong MD      melatonin  3 mg Oral HS PRN Petra Luong MD      nystatin   Topical BID Petra Luong MD      ondansetron  4 mg Intravenous Q6H PRN Petra Luong MD      pantoprazole  40 mg Oral BID AC Petra Luong MD      polyethylene glycol  17 g Oral Daily Nga Sawant PA-C      propranolol  60 mg Oral Daily Petra Luong MD      senna-docusate sodium  2 tablet Oral HS Petra Luong MD      sertraline  25 mg Oral HS Petra L Cherise, MD      witch hazel-glycerin  1 Pad Topical Q4H PRN Petra Luong MD          Today, Patient Was Seen By: Lauren Rangel PA-C    ** Please Note: This note has been constructed using a voice recognition system. **

## 2024-02-18 NOTE — ASSESSMENT & PLAN NOTE
Hx of Multiple sclerosis with chronic indwelling dela cruz catherer.   Hospitalized 2/12 presenting with suprapubic cath dysfunction - At home, cath unable to be passed.   Exchanged 2/12 by urology in ED and discharged home  Represented back to ED on 2/14 with complaint of abdominal pain   Cath was draining up until today   Had nursing bladder scan and patient has close to 300 cc in bladder   Will attempt to irrigate - may need urology consult if unsuccessful

## 2024-02-18 NOTE — ASSESSMENT & PLAN NOTE
Wt Readings from Last 3 Encounters:   02/18/24 65.1 kg (143 lb 8.3 oz)   02/12/24 69 kg (152 lb 1.9 oz)   12/12/23 70.6 kg (155 lb 10.3 oz)   Appears compensated  Not on any diuretics  Monitor I/O and weights

## 2024-02-18 NOTE — PLAN OF CARE
Problem: PAIN - ADULT  Goal: Verbalizes/displays adequate comfort level or baseline comfort level  Description: Interventions:  - Encourage patient to monitor pain and request assistance  - Assess pain using appropriate pain scale  - Administer analgesics based on type and severity of pain and evaluate response  - Implement non-pharmacological measures as appropriate and evaluate response  - Consider cultural and social influences on pain and pain management  - Notify physician/advanced practitioner if interventions unsuccessful or patient reports new pain  Outcome: Progressing     Problem: INFECTION - ADULT  Goal: Absence or prevention of progression during hospitalization  Description: INTERVENTIONS:  - Assess and monitor for signs and symptoms of infection  - Monitor lab/diagnostic results  - Monitor all insertion sites, i.e. indwelling lines, tubes, and drains  - Monitor endotracheal if appropriate and nasal secretions for changes in amount and color  - [place] appropriate cooling/warming therapies per order  - Administer medications as ordered  - Instruct and encourage patient and family to use good hand hygiene technique  - Identify and instruct in appropriate isolation precautions for identified infection/condition  Outcome: Progressing     Problem: SAFETY ADULT  Goal: Patient will remain free of falls  Description: INTERVENTIONS:  - Educate patient/family on patient safety including physical limitations  - Instruct patient to call for assistance with activity   - Consult OT/PT to assist with strengthening/mobility   - Keep Call bell within reach  - Keep bed low and locked with side rails adjusted as appropriate  - Keep care items and personal belongings within reach  - Initiate and maintain comfort rounds  - Make Fall Risk Sign visible to staff  - Offer Toileting every  Hours, in advance of need  - Initiate/Maintain alarm  - Obtain necessary fall risk management equipment:   - Apply yellow socks and  bracelet for high fall risk patients  - Consider moving patient to room near nurses station  Outcome: Progressing  Goal: Maintain or return to baseline ADL function  Description: INTERVENTIONS:  -  Assess patient's ability to carry out ADLs; assess patient's baseline for ADL function and identify physical deficits which impact ability to perform ADLs (bathing, care of mouth/teeth, toileting, grooming, dressing, etc.)  - Assess/evaluate cause of self-care deficits   - Assess range of motion  - Assess patient's mobility; develop plan if impaired  - Assess patient's need for assistive devices and provide as appropriate  - Encourage maximum independence but intervene and supervise when necessary  - Involve family in performance of ADLs  - Assess for home care needs following discharge   - Consider OT consult to assist with ADL evaluation and planning for discharge  - Provide patient education as appropriate  Outcome: Progressing  Goal: Maintains/Returns to pre admission functional level  Description: INTERVENTIONS:  - Perform AM-PAC 6 Click Basic Mobility/ Daily Activity assessment daily.  - Set and communicate daily mobility goal to care team and patient/family/caregiver.   - Collaborate with rehabilitation services on mobility goals if consulted  - Perform Range of Motion  times a day.  - Reposition patient every  hours.  - Dangle patient  times a day  - Stand patient  times a day  - Ambulate patient  times a day  - Out of bed to chair  times a day   - Out of bed for meals  times a day  - Out of bed for toileting  - Record patient progress and toleration of activity level   Outcome: Progressing     Problem: DISCHARGE PLANNING  Goal: Discharge to home or other facility with appropriate resources  Description: INTERVENTIONS:  - Identify barriers to discharge w/patient and caregiver  - Arrange for needed discharge resources and transportation as appropriate  - Identify discharge learning needs (meds, wound care, etc.)  -  Arrange for interpretive services to assist at discharge as needed  - Refer to Case Management Department for coordinating discharge planning if the patient needs post-hospital services based on physician/advanced practitioner order or complex needs related to functional status, cognitive ability, or social support system  Outcome: Progressing     Problem: Prexisting or High Potential for Compromised Skin Integrity  Goal: Skin integrity is maintained or improved  Description: INTERVENTIONS:  - Identify patients at risk for skin breakdown  - Assess and monitor skin integrity  - Assess and monitor nutrition and hydration status  - Monitor labs   - Assess for incontinence   - Turn and reposition patient  - Assist with mobility/ambulation  - Relieve pressure over bony prominences  - Avoid friction and shearing  - Provide appropriate hygiene as needed including keeping skin clean and dry  - Evaluate need for skin moisturizer/barrier cream  - Collaborate with interdisciplinary team   - Patient/family teaching  - Consider wound care consult   Outcome: Progressing     Problem: Nutrition/Hydration-ADULT  Goal: Nutrient/Hydration intake appropriate for improving, restoring or maintaining nutritional needs  Description: Monitor and assess patient's nutrition/hydration status for malnutrition. Collaborate with interdisciplinary team and initiate plan and interventions as ordered.  Monitor patient's weight and dietary intake as ordered or per policy. Utilize nutrition screening tool and intervene as necessary. Determine patient's food preferences and provide high-protein, high-caloric foods as appropriate.     INTERVENTIONS:  - Monitor oral intake, urinary output, labs, and treatment plans  - Assess nutrition and hydration status and recommend course of action  - Evaluate amount of meals eaten  - Assist patient with eating if necessary   - Allow adequate time for meals  - Recommend/ encourage appropriate diets, oral nutritional  supplements, and vitamin/mineral supplements  - Order, calculate, and assess calorie counts as needed  - Recommend, monitor, and adjust tube feedings and TPN/PPN based on assessed needs  - Assess need for intravenous fluids  - Provide specific nutrition/hydration education as appropriate  - Include patient/family/caregiver in decisions related to nutrition  Outcome: Progressing

## 2024-02-18 NOTE — PLAN OF CARE
Problem: PAIN - ADULT  Goal: Verbalizes/displays adequate comfort level or baseline comfort level  Description: Interventions:  - Encourage patient to monitor pain and request assistance  - Assess pain using appropriate pain scale  - Administer analgesics based on type and severity of pain and evaluate response  - Implement non-pharmacological measures as appropriate and evaluate response  - Consider cultural and social influences on pain and pain management  - Notify physician/advanced practitioner if interventions unsuccessful or patient reports new pain  Outcome: Progressing     Problem: INFECTION - ADULT  Goal: Absence or prevention of progression during hospitalization  Description: INTERVENTIONS:  - Assess and monitor for signs and symptoms of infection  - Monitor lab/diagnostic results  - Monitor all insertion sites, i.e. indwelling lines, tubes, and drains  - Monitor endotracheal if appropriate and nasal secretions for changes in amount and color  - [place] appropriate cooling/warming therapies per order  - Administer medications as ordered  - Instruct and encourage patient and family to use good hand hygiene technique  - Identify and instruct in appropriate isolation precautions for identified infection/condition  Outcome: Progressing     Problem: SAFETY ADULT  Goal: Patient will remain free of falls  Description: INTERVENTIONS:  - Educate patient/family on patient safety including physical limitations  - Instruct patient to call for assistance with activity   - Consult OT/PT to assist with strengthening/mobility   - Keep Call bell within reach  - Keep bed low and locked with side rails adjusted as appropriate  - Keep care items and personal belongings within reach  - Initiate and maintain comfort rounds  - Make Fall Risk Sign visible to staff  - Offer Toileting every 2 Hours, in advance of need  - Initiate/Maintain bed alarm  - Obtain necessary fall risk management equipment: call bell inr each and  nonskid footwear on  - Apply yellow socks and bracelet for high fall risk patients  - Consider moving patient to room near nurses station  Outcome: Progressing  Goal: Maintain or return to baseline ADL function  Description: INTERVENTIONS:  -  Assess patient's ability to carry out ADLs; assess patient's baseline for ADL function and identify physical deficits which impact ability to perform ADLs (bathing, care of mouth/teeth, toileting, grooming, dressing, etc.)  - Assess/evaluate cause of self-care deficits   - Assess range of motion  - Assess patient's mobility; develop plan if impaired  - Assess patient's need for assistive devices and provide as appropriate  - Encourage maximum independence but intervene and supervise when necessary  - Involve family in performance of ADLs  - Assess for home care needs following discharge   - Consider OT consult to assist with ADL evaluation and planning for discharge  - Provide patient education as appropriate  Outcome: Progressing  Goal: Maintains/Returns to pre admission functional level  Description: INTERVENTIONS:  - Perform AM-PAC 6 Click Basic Mobility/ Daily Activity assessment daily.  - Set and communicate daily mobility goal to care team and patient/family/caregiver.   - Collaborate with rehabilitation services on mobility goals if consulted  - Perform Range of Motion 3 times a day.  - Reposition patient every 2 hours.  - Dangle patient 3 times a day  - Stand patient 3 times a day  - Ambulate patient 3 times a day  - Out of bed to chair 3 times a day   - Out of bed for meals 3 times a day  - Out of bed for toileting  - Record patient progress and toleration of activity level   Outcome: Progressing     Problem: DISCHARGE PLANNING  Goal: Discharge to home or other facility with appropriate resources  Description: INTERVENTIONS:  - Identify barriers to discharge w/patient and caregiver  - Arrange for needed discharge resources and transportation as appropriate  - Identify  discharge learning needs (meds, wound care, etc.)  - Arrange for interpretive services to assist at discharge as needed  - Refer to Case Management Department for coordinating discharge planning if the patient needs post-hospital services based on physician/advanced practitioner order or complex needs related to functional status, cognitive ability, or social support system  Outcome: Progressing     Problem: Knowledge Deficit  Goal: Patient/family/caregiver demonstrates understanding of disease process, treatment plan, medications, and discharge instructions  Description: Complete learning assessment and assess knowledge base.  Interventions:  - Provide teaching at level of understanding  - Provide teaching via preferred learning methods  Outcome: Progressing     Problem: Prexisting or High Potential for Compromised Skin Integrity  Goal: Skin integrity is maintained or improved  Description: INTERVENTIONS:  - Identify patients at risk for skin breakdown  - Assess and monitor skin integrity  - Assess and monitor nutrition and hydration status  - Monitor labs   - Assess for incontinence   - Turn and reposition patient  - Assist with mobility/ambulation  - Relieve pressure over bony prominences  - Avoid friction and shearing  - Provide appropriate hygiene as needed including keeping skin clean and dry  - Evaluate need for skin moisturizer/barrier cream  - Collaborate with interdisciplinary team   - Patient/family teaching  - Consider wound care consult   Outcome: Progressing     Problem: Nutrition/Hydration-ADULT  Goal: Nutrient/Hydration intake appropriate for improving, restoring or maintaining nutritional needs  Description: Monitor and assess patient's nutrition/hydration status for malnutrition. Collaborate with interdisciplinary team and initiate plan and interventions as ordered.  Monitor patient's weight and dietary intake as ordered or per policy. Utilize nutrition screening tool and intervene as necessary.  Determine patient's food preferences and provide high-protein, high-caloric foods as appropriate.     INTERVENTIONS:  - Monitor oral intake, urinary output, labs, and treatment plans  - Assess nutrition and hydration status and recommend course of action  - Evaluate amount of meals eaten  - Assist patient with eating if necessary   - Allow adequate time for meals  - Recommend/ encourage appropriate diets, oral nutritional supplements, and vitamin/mineral supplements  - Order, calculate, and assess calorie counts as needed  - Recommend, monitor, and adjust tube feedings and TPN/PPN based on assessed needs  - Assess need for intravenous fluids  - Provide specific nutrition/hydration education as appropriate  - Include patient/family/caregiver in decisions related to nutrition  Outcome: Progressing

## 2024-02-18 NOTE — ASSESSMENT & PLAN NOTE
UA with leukocytes, WBC, bacteria   Urine culture >100,000 kleb pneumoniae and enterobacter cloace   Suspect enterobacter contaminate as pt has improved without coverage of organism  Completed 3 days IV ceftriaxone   BC negative at 71

## 2024-02-19 LAB
ANION GAP SERPL CALCULATED.3IONS-SCNC: 7 MMOL/L
BACTERIA BLD CULT: NORMAL
BASOPHILS # BLD AUTO: 0.12 THOUSANDS/ÂΜL (ref 0–0.1)
BASOPHILS NFR BLD AUTO: 1 % (ref 0–1)
BUN SERPL-MCNC: 15 MG/DL (ref 5–25)
CALCIUM SERPL-MCNC: 9.3 MG/DL (ref 8.4–10.2)
CHLORIDE SERPL-SCNC: 102 MMOL/L (ref 96–108)
CO2 SERPL-SCNC: 24 MMOL/L (ref 21–32)
CREAT SERPL-MCNC: 0.96 MG/DL (ref 0.6–1.3)
EOSINOPHIL # BLD AUTO: 0.68 THOUSAND/ÂΜL (ref 0–0.61)
EOSINOPHIL NFR BLD AUTO: 4 % (ref 0–6)
ERYTHROCYTE [DISTWIDTH] IN BLOOD BY AUTOMATED COUNT: 15.2 % (ref 11.6–15.1)
GFR SERPL CREATININE-BSD FRML MDRD: 77 ML/MIN/1.73SQ M
GLUCOSE SERPL-MCNC: 142 MG/DL (ref 65–140)
GLUCOSE SERPL-MCNC: 178 MG/DL (ref 65–140)
GLUCOSE SERPL-MCNC: 202 MG/DL (ref 65–140)
GLUCOSE SERPL-MCNC: 258 MG/DL (ref 65–140)
GLUCOSE SERPL-MCNC: 281 MG/DL (ref 65–140)
HCT VFR BLD AUTO: 39.8 % (ref 36.5–49.3)
HGB BLD-MCNC: 13.2 G/DL (ref 12–17)
IMM GRANULOCYTES # BLD AUTO: 0.04 THOUSAND/UL (ref 0–0.2)
IMM GRANULOCYTES NFR BLD AUTO: 0 % (ref 0–2)
LYMPHOCYTES # BLD AUTO: 2.51 THOUSANDS/ÂΜL (ref 0.6–4.47)
LYMPHOCYTES NFR BLD AUTO: 15 % (ref 14–44)
MCH RBC QN AUTO: 29.7 PG (ref 26.8–34.3)
MCHC RBC AUTO-ENTMCNC: 33.2 G/DL (ref 31.4–37.4)
MCV RBC AUTO: 89 FL (ref 82–98)
MONOCYTES # BLD AUTO: 0.93 THOUSAND/ÂΜL (ref 0.17–1.22)
MONOCYTES NFR BLD AUTO: 6 % (ref 4–12)
NEUTROPHILS # BLD AUTO: 12.29 THOUSANDS/ÂΜL (ref 1.85–7.62)
NEUTS SEG NFR BLD AUTO: 74 % (ref 43–75)
NRBC BLD AUTO-RTO: 0 /100 WBCS
PLATELET # BLD AUTO: 281 THOUSANDS/UL (ref 149–390)
PMV BLD AUTO: 8.1 FL (ref 8.9–12.7)
POTASSIUM SERPL-SCNC: 4.1 MMOL/L (ref 3.5–5.3)
RBC # BLD AUTO: 4.45 MILLION/UL (ref 3.88–5.62)
SODIUM SERPL-SCNC: 133 MMOL/L (ref 135–147)
WBC # BLD AUTO: 16.57 THOUSAND/UL (ref 4.31–10.16)

## 2024-02-19 PROCEDURE — 85025 COMPLETE CBC W/AUTO DIFF WBC: CPT | Performed by: STUDENT IN AN ORGANIZED HEALTH CARE EDUCATION/TRAINING PROGRAM

## 2024-02-19 PROCEDURE — 82948 REAGENT STRIP/BLOOD GLUCOSE: CPT

## 2024-02-19 PROCEDURE — 99232 SBSQ HOSP IP/OBS MODERATE 35: CPT | Performed by: STUDENT IN AN ORGANIZED HEALTH CARE EDUCATION/TRAINING PROGRAM

## 2024-02-19 PROCEDURE — 80048 BASIC METABOLIC PNL TOTAL CA: CPT | Performed by: STUDENT IN AN ORGANIZED HEALTH CARE EDUCATION/TRAINING PROGRAM

## 2024-02-19 RX ADMIN — HEPARIN SODIUM 5000 UNITS: 5000 INJECTION INTRAVENOUS; SUBCUTANEOUS at 13:32

## 2024-02-19 RX ADMIN — PROPRANOLOL HYDROCHLORIDE 60 MG: 60 CAPSULE, EXTENDED RELEASE ORAL at 09:29

## 2024-02-19 RX ADMIN — ATORVASTATIN CALCIUM 80 MG: 80 TABLET, FILM COATED ORAL at 09:28

## 2024-02-19 RX ADMIN — CLOPIDOGREL BISULFATE 75 MG: 75 TABLET ORAL at 09:28

## 2024-02-19 RX ADMIN — PIPERACILLIN SODIUM AND TAZOBACTAM SODIUM 3.38 G: 36; 4.5 INJECTION, POWDER, LYOPHILIZED, FOR SOLUTION INTRAVENOUS at 14:32

## 2024-02-19 RX ADMIN — INSULIN LISPRO 1 UNITS: 100 INJECTION, SOLUTION INTRAVENOUS; SUBCUTANEOUS at 11:54

## 2024-02-19 RX ADMIN — PIPERACILLIN SODIUM AND TAZOBACTAM SODIUM 3.38 G: 36; 4.5 INJECTION, POWDER, LYOPHILIZED, FOR SOLUTION INTRAVENOUS at 22:04

## 2024-02-19 RX ADMIN — GABAPENTIN 300 MG: 300 CAPSULE ORAL at 17:05

## 2024-02-19 RX ADMIN — GABAPENTIN 600 MG: 300 CAPSULE ORAL at 22:07

## 2024-02-19 RX ADMIN — LATANOPROST 1 DROP: 50 SOLUTION OPHTHALMIC at 22:09

## 2024-02-19 RX ADMIN — SERTRALINE HYDROCHLORIDE 25 MG: 25 TABLET ORAL at 22:08

## 2024-02-19 RX ADMIN — SENNOSIDES AND DOCUSATE SODIUM 2 TABLET: 8.6; 5 TABLET ORAL at 22:07

## 2024-02-19 RX ADMIN — AMLODIPINE BESYLATE 10 MG: 10 TABLET ORAL at 09:28

## 2024-02-19 RX ADMIN — PANTOPRAZOLE SODIUM 40 MG: 40 TABLET, DELAYED RELEASE ORAL at 06:16

## 2024-02-19 RX ADMIN — HEPARIN SODIUM 5000 UNITS: 5000 INJECTION INTRAVENOUS; SUBCUTANEOUS at 22:04

## 2024-02-19 RX ADMIN — INSULIN LISPRO 2 UNITS: 100 INJECTION, SOLUTION INTRAVENOUS; SUBCUTANEOUS at 16:39

## 2024-02-19 RX ADMIN — GABAPENTIN 300 MG: 300 CAPSULE ORAL at 09:28

## 2024-02-19 RX ADMIN — PANTOPRAZOLE SODIUM 40 MG: 40 TABLET, DELAYED RELEASE ORAL at 15:31

## 2024-02-19 RX ADMIN — NYSTATIN: 100000 POWDER TOPICAL at 09:30

## 2024-02-19 RX ADMIN — NYSTATIN: 100000 POWDER TOPICAL at 17:06

## 2024-02-19 RX ADMIN — CEFTRIAXONE 1000 MG: 1 INJECTION, SOLUTION INTRAVENOUS at 00:53

## 2024-02-19 RX ADMIN — HEPARIN SODIUM 5000 UNITS: 5000 INJECTION INTRAVENOUS; SUBCUTANEOUS at 06:16

## 2024-02-19 RX ADMIN — INSULIN LISPRO 1 UNITS: 100 INJECTION, SOLUTION INTRAVENOUS; SUBCUTANEOUS at 22:04

## 2024-02-19 NOTE — ASSESSMENT & PLAN NOTE
UA with leukocytes, WBC, bacteria   Urine culture >100,000 kleb pneumoniae and enterobacter cloace   Suspect kleb colonization as had multiple previous cultures  With rising WBCs, will treat enterobacter, change abx to zosyn  If improving, switch to levaquin on discharge to complete 7 day course

## 2024-02-19 NOTE — PLAN OF CARE
Problem: PAIN - ADULT  Goal: Verbalizes/displays adequate comfort level or baseline comfort level  Description: Interventions:  - Encourage patient to monitor pain and request assistance  - Assess pain using appropriate pain scale  - Administer analgesics based on type and severity of pain and evaluate response  - Implement non-pharmacological measures as appropriate and evaluate response  - Consider cultural and social influences on pain and pain management  - Notify physician/advanced practitioner if interventions unsuccessful or patient reports new pain  Outcome: Progressing     Problem: INFECTION - ADULT  Goal: Absence or prevention of progression during hospitalization  Description: INTERVENTIONS:  - Assess and monitor for signs and symptoms of infection  - Monitor lab/diagnostic results  - Monitor all insertion sites, i.e. indwelling lines, tubes, and drains  - Monitor endotracheal if appropriate and nasal secretions for changes in amount and color  - [place] appropriate cooling/warming therapies per order  - Administer medications as ordered  - Instruct and encourage patient and family to use good hand hygiene technique  - Identify and instruct in appropriate isolation precautions for identified infection/condition  Outcome: Progressing     Problem: SAFETY ADULT  Goal: Patient will remain free of falls  Description: INTERVENTIONS:  - Educate patient/family on patient safety including physical limitations  - Instruct patient to call for assistance with activity   - Consult OT/PT to assist with strengthening/mobility   - Keep Call bell within reach  - Keep bed low and locked with side rails adjusted as appropriate  - Keep care items and personal belongings within reach  - Initiate and maintain comfort rounds  - Make Fall Risk Sign visible to staff  - Apply yellow socks and bracelet for high fall risk patients  - Consider moving patient to room near nurses station  Outcome: Progressing     Problem: DISCHARGE  PLANNING  Goal: Discharge to home or other facility with appropriate resources  Description: INTERVENTIONS:  - Identify barriers to discharge w/patient and caregiver  - Arrange for needed discharge resources and transportation as appropriate  - Identify discharge learning needs (meds, wound care, etc.)  - Arrange for interpretive services to assist at discharge as needed  - Refer to Case Management Department for coordinating discharge planning if the patient needs post-hospital services based on physician/advanced practitioner order or complex needs related to functional status, cognitive ability, or social support system  Outcome: Progressing     Problem: Knowledge Deficit  Goal: Patient/family/caregiver demonstrates understanding of disease process, treatment plan, medications, and discharge instructions  Description: Complete learning assessment and assess knowledge base.  Interventions:  - Provide teaching at level of understanding  - Provide teaching via preferred learning methods  Outcome: Progressing     Problem: Nutrition/Hydration-ADULT  Goal: Nutrient/Hydration intake appropriate for improving, restoring or maintaining nutritional needs  Description: Monitor and assess patient's nutrition/hydration status for malnutrition. Collaborate with interdisciplinary team and initiate plan and interventions as ordered.  Monitor patient's weight and dietary intake as ordered or per policy. Utilize nutrition screening tool and intervene as necessary. Determine patient's food preferences and provide high-protein, high-caloric foods as appropriate.     INTERVENTIONS:  - Monitor oral intake, urinary output, labs, and treatment plans  - Assess nutrition and hydration status and recommend course of action  - Evaluate amount of meals eaten  - Assist patient with eating if necessary   - Allow adequate time for meals  - Recommend/ encourage appropriate diets, oral nutritional supplements, and vitamin/mineral supplements  -  Order, calculate, and assess calorie counts as needed  - Recommend, monitor, and adjust tube feedings and TPN/PPN based on assessed needs  - Assess need for intravenous fluids  - Provide specific nutrition/hydration education as appropriate  - Include patient/family/caregiver in decisions related to nutrition  Outcome: Progressing

## 2024-02-19 NOTE — PLAN OF CARE
Problem: PAIN - ADULT  Goal: Verbalizes/displays adequate comfort level or baseline comfort level  Description: Interventions:  - Encourage patient to monitor pain and request assistance  - Assess pain using appropriate pain scale  - Administer analgesics based on type and severity of pain and evaluate response  - Implement non-pharmacological measures as appropriate and evaluate response  - Consider cultural and social influences on pain and pain management  - Notify physician/advanced practitioner if interventions unsuccessful or patient reports new pain  Outcome: Progressing     Problem: INFECTION - ADULT  Goal: Absence or prevention of progression during hospitalization  Description: INTERVENTIONS:  - Assess and monitor for signs and symptoms of infection  - Monitor lab/diagnostic results  - Monitor all insertion sites, i.e. indwelling lines, tubes, and drains  - Monitor endotracheal if appropriate and nasal secretions for changes in amount and color  - [place] appropriate cooling/warming therapies per order  - Administer medications as ordered  - Instruct and encourage patient and family to use good hand hygiene technique  - Identify and instruct in appropriate isolation precautions for identified infection/condition  Outcome: Progressing     Problem: SAFETY ADULT  Goal: Patient will remain free of falls  Description: INTERVENTIONS:  - Educate patient/family on patient safety including physical limitations  - Instruct patient to call for assistance with activity   - Consult OT/PT to assist with strengthening/mobility   - Keep Call bell within reach  - Keep bed low and locked with side rails adjusted as appropriate  - Keep care items and personal belongings within reach  - Initiate and maintain comfort rounds  - Make Fall Risk Sign visible to staff  - Offer Toileting every 2 Hours, in advance of need  - Initiate/Maintain bed alarm  - Obtain necessary fall risk management equipment: call bell in reach and  nonskid footwear on  - Apply yellow socks and bracelet for high fall risk patients  - Consider moving patient to room near nurses station  Outcome: Progressing  Goal: Maintain or return to baseline ADL function  Description: INTERVENTIONS:  -  Assess patient's ability to carry out ADLs; assess patient's baseline for ADL function and identify physical deficits which impact ability to perform ADLs (bathing, care of mouth/teeth, toileting, grooming, dressing, etc.)  - Assess/evaluate cause of self-care deficits   - Assess range of motion  - Assess patient's mobility; develop plan if impaired  - Assess patient's need for assistive devices and provide as appropriate  - Encourage maximum independence but intervene and supervise when necessary  - Involve family in performance of ADLs  - Assess for home care needs following discharge   - Consider OT consult to assist with ADL evaluation and planning for discharge  - Provide patient education as appropriate  Outcome: Progressing  Goal: Maintains/Returns to pre admission functional level  Description: INTERVENTIONS:  - Perform AM-PAC 6 Click Basic Mobility/ Daily Activity assessment daily.  - Set and communicate daily mobility goal to care team and patient/family/caregiver.   - Collaborate with rehabilitation services on mobility goals if consulted  - Perform Range of Motion 3 times a day.  - Reposition patient every 2 hours.  - Dangle patient 3 times a day  - Stand patient 3 times a day  - Ambulate patient 3 times a day  - Out of bed to chair 3 times a day   - Out of bed for meals 3 times a day  - Out of bed for toileting  - Record patient progress and toleration of activity level   Outcome: Progressing     Problem: DISCHARGE PLANNING  Goal: Discharge to home or other facility with appropriate resources  Description: INTERVENTIONS:  - Identify barriers to discharge w/patient and caregiver  - Arrange for needed discharge resources and transportation as appropriate  - Identify  discharge learning needs (meds, wound care, etc.)  - Arrange for interpretive services to assist at discharge as needed  - Refer to Case Management Department for coordinating discharge planning if the patient needs post-hospital services based on physician/advanced practitioner order or complex needs related to functional status, cognitive ability, or social support system  Outcome: Progressing     Problem: Knowledge Deficit  Goal: Patient/family/caregiver demonstrates understanding of disease process, treatment plan, medications, and discharge instructions  Description: Complete learning assessment and assess knowledge base.  Interventions:  - Provide teaching at level of understanding  - Provide teaching via preferred learning methods  Outcome: Progressing     Problem: Prexisting or High Potential for Compromised Skin Integrity  Goal: Skin integrity is maintained or improved  Description: INTERVENTIONS:  - Identify patients at risk for skin breakdown  - Assess and monitor skin integrity  - Assess and monitor nutrition and hydration status  - Monitor labs   - Assess for incontinence   - Turn and reposition patient  - Assist with mobility/ambulation  - Relieve pressure over bony prominences  - Avoid friction and shearing  - Provide appropriate hygiene as needed including keeping skin clean and dry  - Evaluate need for skin moisturizer/barrier cream  - Collaborate with interdisciplinary team   - Patient/family teaching  - Consider wound care consult   Outcome: Progressing     Problem: Nutrition/Hydration-ADULT  Goal: Nutrient/Hydration intake appropriate for improving, restoring or maintaining nutritional needs  Description: Monitor and assess patient's nutrition/hydration status for malnutrition. Collaborate with interdisciplinary team and initiate plan and interventions as ordered.  Monitor patient's weight and dietary intake as ordered or per policy. Utilize nutrition screening tool and intervene as necessary.  Determine patient's food preferences and provide high-protein, high-caloric foods as appropriate.     INTERVENTIONS:  - Monitor oral intake, urinary output, labs, and treatment plans  - Assess nutrition and hydration status and recommend course of action  - Evaluate amount of meals eaten  - Assist patient with eating if necessary   - Allow adequate time for meals  - Recommend/ encourage appropriate diets, oral nutritional supplements, and vitamin/mineral supplements  - Order, calculate, and assess calorie counts as needed  - Recommend, monitor, and adjust tube feedings and TPN/PPN based on assessed needs  - Assess need for intravenous fluids  - Provide specific nutrition/hydration education as appropriate  - Include patient/family/caregiver in decisions related to nutrition  Outcome: Progressing

## 2024-02-19 NOTE — CASE MANAGEMENT
Case Management Discharge Planning Note    Patient name Mathew Rico  [facility] /[facility] -* MRN [id number]  : 1950 Date 2024       Current Admission Date: 2024  Current Admission Diagnosis:Stercoral colitis   Patient Active Problem List    Diagnosis Date Noted    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    Urinary tract infection associated with indwelling urethral catheter  02/15/2024    Fall 2023    Weakness 2023    Accidental fall from chair 2023    Constipation 2023    Chronic diastolic congestive heart failure (HCC) 2023    Hyponatremia 2023    Excessive daytime sleepiness 2022    Shortness of breath 2022    Pancreatic mass 2020    Pancreatic lesion 2020    Bladder stones 2019    Bladder neck contracture 2019    History of CVA (cerebrovascular accident) 10/21/2018    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HCC) 2017    Diabetic neuropathy (HCC) 2016    Chronic suprapubic catheter (HCC) 2016    Thyroid nodule 2015    Cervical spinal stenosis 2014    Generalized anxiety disorder 2014    Urinary retention 2014    Obstructive sleep apnea 2013    Benign colon polyp 2012    Esophageal reflux 2012    Fatty liver 2012    Glaucoma 2012    Hyperlipidemia 2012    Multiple sclerosis (HCC) 2012    Type 2 diabetes mellitus with hyperglycemia, without long-term current use of insulin (HCC) 2012    Primary hypertension 2012      LOS (days): 4  Geometric Mean LOS (GMLOS) (days): 3.3  Days to GMLOS:-1.2     OBJECTIVE:  Risk of Unplanned Readmission Score: 29         Current admission status: Inpatient   Preferred Pharmacy:   [id number] - [facility] - [id number] [facility] [address]  Phone: [phone] Fax: [phone]    [place], [place] -  [address]  [facility]  [address]  Phone: [phone] Fax: [phone]    [facility] - [facility] - [address],  [address],  [address] [place]  [place] [address]  Phone: [phone] Fax: [phone]    Primary Care Provider: Steve Dickerson DO    Primary Insurance: [facility]  Secondary Insurance:     DISCHARGE DETAILS:    Discharge planning discussed with:: Patient and pt's brother  Freedom of Choice: Yes  Comments - Freedom of Choice: Choice list reviewed with pt and pt's brother who would both like to move forward with [facility] which is closer to the pt's home.     IMM Given (Date):: 02/19/24  IMM Given to:: Patient  Family notified:: Pt's brother  Additional Comments: Message sent to [facility] informing that they are first choice and left message for [facility] Leslee requesting a return call. IMM reviewed with pt and pt's brother who both expressed understanding. CM department to follow.

## 2024-02-19 NOTE — PROGRESS NOTES
[facility]  Progress Note  Name: Mathew Rico I  MRN: [id number]  Unit/Bed#: E5 -01 I Date of Admission: 2/14/2024   Date of Service: 2/19/2024 I Hospital Day: 4    Assessment/Plan   Urinary tract infection associated with indwelling urethral catheter   Assessment & Plan  UA with leukocytes, WBC, bacteria   Urine culture >100,000 kleb pneumoniae and enterobacter cloace   Suspect kleb colonization as had multiple previous cultures  With rising WBCs, will treat enterobacter, change abx to zosyn  If improving, switch to levaquin on discharge to complete 7 day course    Constipation  Assessment & Plan  Continue scheduled miralax, senna  Now resolved, recommend bowel regimen as above    Chronic diastolic congestive heart failure (HCC)  Assessment & Plan  Wt Readings from Last 3 Encounters:   02/19/24 64.2 kg (141 lb 8.6 oz)   02/12/24 69 kg (152 lb 1.9 oz)   12/12/23 70.6 kg (155 lb 10.3 oz)   Appears compensated  Not on any diuretics    Urinary retention  Assessment & Plan  Chronic due to MS with chronic suprapubic tube in place    Multiple sclerosis (HCC)  Assessment & Plan  Continue neurontin 300 mg BID    Primary hypertension  Assessment & Plan  Continue home propanolol 60 mg daily and norvasc 10 mg daily     Chronic suprapubic catheter (HCC)  Assessment & Plan  Hx of Multiple sclerosis with chronic indwelling dela cruz catherer.   Hospitalized 2/12 presenting with suprapubic cath dysfunction - At home, cath unable to be passed.   Exchanged 2/12 by urology in ED and discharged home  Currently draining without issues    * Stercoral colitis  Assessment & Plan  Presented with abdomen pain.   Ct abdomen shows increased stool within the visualized colon, additionally rectal vault with hard stool and thickening of the rectal wall suggestive of sterile coral colitis  Continue scheduled miralax, senna.  Noted multiple Bms on 2/18             VTE Pharmacologic Prophylaxis:   Pharmacologic:  Heparin  Mechanical VTE Prophylaxis in Place: Yes    Current Length of Stay: 4 day(s)    Current Patient Status: Inpatient   Certification Statement: The patient will continue to require additional inpatient hospital stay due to will need STR    Discharge Plan: 24-48 hours    Code Status: Level 1 - Full Code      Subjective:   No events overnight. Had a fever yesterday afternoon. No complaints today. Tolerating diet.    Objective:     Vitals:   Temp (24hrs), Av.9 °F (37.2 °C), Min:98 °F (36.7 °C), Max:100.9 °F (38.3 °C)    Temp:  [98 °F (36.7 °C)-100.9 °F (38.3 °C)] 99.2 °F (37.3 °C)  HR:  [83-98] 89  Resp:  [16] 16  BP: (104-122)/(50-64) 122/64  SpO2:  [93 %-94 %] 94 %  Body mass index is 22.17 kg/m².     Input and Output Summary (last 24 hours):       Intake/Output Summary (Last 24 hours) at 2024 1404  Last data filed at 2024 0614  Gross per 24 hour   Intake --   Output 450 ml   Net -450 ml       Physical Exam:     Physical Exam  Vitals and nursing note reviewed.   Constitutional:       Appearance: Normal appearance.   HENT:      Head: Normocephalic.   Eyes:      Conjunctiva/sclera: Conjunctivae normal.   Cardiovascular:      Rate and Rhythm: Normal rate.   Pulmonary:      Effort: Pulmonary effort is normal. No respiratory distress.   Abdominal:      General: Bowel sounds are normal. There is no distension.      Palpations: Abdomen is soft.   Genitourinary:     Comments: Suprapubic dela cruz  Musculoskeletal:         General: No swelling. Normal range of motion.   Skin:     General: Skin is warm and dry.   Neurological:      General: No focal deficit present.      Mental Status: He is alert. Mental status is at baseline.         Additional Data:     Labs:    Results from last 7 days   Lab Units 24  0938   WBC Thousand/uL 16.57*   HEMOGLOBIN g/dL 13.2   HEMATOCRIT % 39.8   PLATELETS Thousands/uL 281   NEUTROS PCT % 74   LYMPHS PCT % 15   MONOS PCT % 6   EOS PCT % 4     Results from last 7 days   Lab  Units 02/19/24  0938 02/16/24  0431 02/14/24  2314   SODIUM mmol/L 133*   < > 134*   POTASSIUM mmol/L 4.1   < > 4.0   CHLORIDE mmol/L 102   < > 99   CO2 mmol/L 24   < > 27   BUN mg/dL 15   < > 13   CREATININE mg/dL 0.96   < > 0.74   ANION GAP mmol/L 7   < > 8   CALCIUM mg/dL 9.3   < > 10.2   ALBUMIN g/dL  --   --  3.8   TOTAL BILIRUBIN mg/dL  --   --  0.29   ALK PHOS U/L  --   --  78   ALT U/L  --   --  14   AST U/L  --   --  16   GLUCOSE RANDOM mg/dL 258*   < > 122    < > = values in this interval not displayed.         Results from last 7 days   Lab Units 02/19/24  1130 02/19/24  0730 02/18/24  2209 02/18/24  1543 02/18/24  1102 02/18/24  0737 02/17/24  2153 02/17/24  1630 02/17/24  1145 02/17/24  0702 02/16/24  2224 02/16/24  1557   POC GLUCOSE mg/dl 202* 142* 174* 186* 160* 166* 134 149* 112 136 138 128         Results from last 7 days   Lab Units 02/15/24  0131 02/14/24  2314   LACTIC ACID mmol/L 1.9  --    PROCALCITONIN ng/ml  --  0.06           * I Have Reviewed All Lab Data Listed Above.  * Additional Pertinent Lab Tests Reviewed: All Labs For Current Hospital Admission Reviewed    Mobility:  Basic Mobility Inpatient Raw Score: 8  [facility] Goal: 3: Sit at edge of bed  [facility] Achieved: 2: Bed activities/Dependent transfer    Lines:     Invasive Devices       Peripheral Intravenous Line  Duration             Peripheral IV 02/18/24 Dorsal (posterior);Right Forearm 1 day              Drain  Duration             Suprapubic Catheter 16 Fr. 6 days                       Imaging:    Imaging Reports Reviewed Today Include:     XR chest 1 view portable    Result Date: 2/15/2024  Impression: No acute cardiopulmonary disease. Workstation performed: [id number]     CT abdomen pelvis w contrast    Result Date: 2/15/2024  Impression: Increased stool within the visualized colon. Correlate clinically for constipation. Additionally at the rectal vault there is hard and stool with thickening of the rectal wall suggestive of  stercoral colitis. Suprapubic catheter is in place. There is bladder wall thickening which could be due to nondistention, however correlate clinically and with urinalysis to evaluate for possible cystitis. Workstation performed: [id number]        Recent Cultures (last 7 days):     Results from last 7 days   Lab Units 02/15/24  0131 02/14/24  2316   BLOOD CULTURE  No Growth After 4 Days.  No Growth After 4 Days.  --    URINE CULTURE   --  80,000-89,000 cfu/ml Klebsiella pneumoniae*  60,000-69,000 cfu/ml Enterobacter cloacae*       Last 24 Hours Medication List:   Current Facility-Administered Medications   Medication Dose Route Frequency Provider Last Rate    acetaminophen  650 mg Oral Q6H PRN Petra Luong MD      albuterol  2.5 mg Nebulization Q6H PRN Petra Luong MD      ALPRAZolam  0.25 mg Oral BID PRN Petra Luong MD      amLODIPine  10 mg Oral Daily Petra Luong MD      And    atorvastatin  80 mg Oral Daily Petra Luong MD      bisacodyl  10 mg Rectal Daily PRN Nga Sawant, PA-C      clopidogrel  75 mg Oral Daily Petra Luong MD      gabapentin  300 mg Oral BID Petra Luong MD      gabapentin  600 mg Oral HS Petra Luong MD      heparin (porcine)  5,000 Units Subcutaneous Q8H [facility] Petra Luong MD      insulin lispro  1-5 Units Subcutaneous TID AC Petra Luong MD      insulin lispro  1-5 Units Subcutaneous HS Petra Luong MD      latanoprost  1 drop Both Eyes HS Petra Luong MD      melatonin  3 mg Oral HS PRN Petra Luong MD      nystatin   Topical BID Petra Luong MD      ondansetron  4 mg Intravenous Q6H PRN Petra Luong MD      pantoprazole  40 mg Oral BID AC Petra Luong MD      piperacillin-tazobactam  3.375 g Intravenous Q6H Mahad Perea MD      polyethylene glycol  17 g Oral Daily Nga Sawant, PA-C      propranolol  60 mg Oral Daily Petra Luong MD      senna-docusate sodium  2 tablet Oral HS Petra SIMMONS  MD Cherise      sertraline  25 mg Oral HS Petra L Cherise, MD      witch hazel-glycerin  1 Pad Topical Q4H PRN Petra Luong MD          Today, Patient Was Seen By: Mahad Perea MD    ** Please Note: Dictation voice to text software may have been used in the creation of this document. **

## 2024-02-19 NOTE — ASSESSMENT & PLAN NOTE
Presented with abdomen pain.   Ct abdomen shows increased stool within the visualized colon, additionally rectal vault with hard stool and thickening of the rectal wall suggestive of sterile coral colitis  Continue scheduled miralax, senna.  Noted multiple Bms on 2/18

## 2024-02-19 NOTE — ASSESSMENT & PLAN NOTE
Hx of Multiple sclerosis with chronic indwelling dela cruz catherer.   Hospitalized 2/12 presenting with suprapubic cath dysfunction - At home, cath unable to be passed.   Exchanged 2/12 by urology in ED and discharged home  Currently draining without issues

## 2024-02-19 NOTE — ASSESSMENT & PLAN NOTE
Wt Readings from Last 3 Encounters:   02/19/24 64.2 kg (141 lb 8.6 oz)   02/12/24 69 kg (152 lb 1.9 oz)   12/12/23 70.6 kg (155 lb 10.3 oz)   Appears compensated  Not on any diuretics

## 2024-02-20 VITALS
OXYGEN SATURATION: 94 % | HEIGHT: 67 IN | BODY MASS INDEX: 22.21 KG/M2 | WEIGHT: 141.54 LBS | RESPIRATION RATE: 18 BRPM | TEMPERATURE: 98 F | SYSTOLIC BLOOD PRESSURE: 126 MMHG | DIASTOLIC BLOOD PRESSURE: 53 MMHG | HEART RATE: 80 BPM

## 2024-02-20 LAB
ALBUMIN SERPL BCP-MCNC: 3.2 G/DL (ref 3.5–5)
ALP SERPL-CCNC: 58 U/L (ref 34–104)
ALT SERPL W P-5'-P-CCNC: 37 U/L (ref 7–52)
ANION GAP SERPL CALCULATED.3IONS-SCNC: 8 MMOL/L
AST SERPL W P-5'-P-CCNC: 25 U/L (ref 13–39)
BACTERIA BLD CULT: NORMAL
BASOPHILS # BLD AUTO: 0.09 THOUSANDS/ÂΜL (ref 0–0.1)
BASOPHILS NFR BLD AUTO: 1 % (ref 0–1)
BILIRUB SERPL-MCNC: 0.43 MG/DL (ref 0.2–1)
BUN SERPL-MCNC: 12 MG/DL (ref 5–25)
CALCIUM ALBUM COR SERPL-MCNC: 9.7 MG/DL (ref 8.3–10.1)
CALCIUM SERPL-MCNC: 9.1 MG/DL (ref 8.4–10.2)
CHLORIDE SERPL-SCNC: 105 MMOL/L (ref 96–108)
CO2 SERPL-SCNC: 24 MMOL/L (ref 21–32)
CREAT SERPL-MCNC: 0.79 MG/DL (ref 0.6–1.3)
EOSINOPHIL # BLD AUTO: 0.94 THOUSAND/ÂΜL (ref 0–0.61)
EOSINOPHIL NFR BLD AUTO: 6 % (ref 0–6)
ERYTHROCYTE [DISTWIDTH] IN BLOOD BY AUTOMATED COUNT: 15.1 % (ref 11.6–15.1)
GFR SERPL CREATININE-BSD FRML MDRD: 88 ML/MIN/1.73SQ M
GLUCOSE SERPL-MCNC: 137 MG/DL (ref 65–140)
GLUCOSE SERPL-MCNC: 141 MG/DL (ref 65–140)
GLUCOSE SERPL-MCNC: 192 MG/DL (ref 65–140)
HCT VFR BLD AUTO: 36.4 % (ref 36.5–49.3)
HGB BLD-MCNC: 11.9 G/DL (ref 12–17)
IMM GRANULOCYTES # BLD AUTO: 0.06 THOUSAND/UL (ref 0–0.2)
IMM GRANULOCYTES NFR BLD AUTO: 0 % (ref 0–2)
LYMPHOCYTES # BLD AUTO: 4.28 THOUSANDS/ÂΜL (ref 0.6–4.47)
LYMPHOCYTES NFR BLD AUTO: 29 % (ref 14–44)
MCH RBC QN AUTO: 30 PG (ref 26.8–34.3)
MCHC RBC AUTO-ENTMCNC: 32.7 G/DL (ref 31.4–37.4)
MCV RBC AUTO: 92 FL (ref 82–98)
MONOCYTES # BLD AUTO: 0.94 THOUSAND/ÂΜL (ref 0.17–1.22)
MONOCYTES NFR BLD AUTO: 6 % (ref 4–12)
NEUTROPHILS # BLD AUTO: 8.7 THOUSANDS/ÂΜL (ref 1.85–7.62)
NEUTS SEG NFR BLD AUTO: 58 % (ref 43–75)
NRBC BLD AUTO-RTO: 0 /100 WBCS
PLATELET # BLD AUTO: 276 THOUSANDS/UL (ref 149–390)
PMV BLD AUTO: 8.7 FL (ref 8.9–12.7)
POTASSIUM SERPL-SCNC: 3.9 MMOL/L (ref 3.5–5.3)
PROT SERPL-MCNC: 6.1 G/DL (ref 6.4–8.4)
RBC # BLD AUTO: 3.97 MILLION/UL (ref 3.88–5.62)
SODIUM SERPL-SCNC: 137 MMOL/L (ref 135–147)
WBC # BLD AUTO: 15.01 THOUSAND/UL (ref 4.31–10.16)

## 2024-02-20 PROCEDURE — 97535 SELF CARE MNGMENT TRAINING: CPT

## 2024-02-20 PROCEDURE — 99239 HOSP IP/OBS DSCHRG MGMT >30: CPT | Performed by: STUDENT IN AN ORGANIZED HEALTH CARE EDUCATION/TRAINING PROGRAM

## 2024-02-20 PROCEDURE — 97530 THERAPEUTIC ACTIVITIES: CPT

## 2024-02-20 PROCEDURE — 82948 REAGENT STRIP/BLOOD GLUCOSE: CPT

## 2024-02-20 PROCEDURE — 85025 COMPLETE CBC W/AUTO DIFF WBC: CPT | Performed by: STUDENT IN AN ORGANIZED HEALTH CARE EDUCATION/TRAINING PROGRAM

## 2024-02-20 PROCEDURE — 80053 COMPREHEN METABOLIC PANEL: CPT | Performed by: STUDENT IN AN ORGANIZED HEALTH CARE EDUCATION/TRAINING PROGRAM

## 2024-02-20 RX ORDER — BISACODYL 10 MG
10 SUPPOSITORY, RECTAL RECTAL DAILY PRN
Start: 2024-02-20

## 2024-02-20 RX ORDER — LEVOFLOXACIN 750 MG/1
750 TABLET, FILM COATED ORAL EVERY 24 HOURS
Qty: 7 TABLET | Refills: 0
Start: 2024-02-20 | End: 2024-02-27

## 2024-02-20 RX ORDER — POLYETHYLENE GLYCOL 3350 17 G/17G
17 POWDER, FOR SOLUTION ORAL DAILY
Start: 2024-02-21

## 2024-02-20 RX ADMIN — PROPRANOLOL HYDROCHLORIDE 60 MG: 60 CAPSULE, EXTENDED RELEASE ORAL at 08:01

## 2024-02-20 RX ADMIN — NYSTATIN: 100000 POWDER TOPICAL at 08:01

## 2024-02-20 RX ADMIN — CLOPIDOGREL BISULFATE 75 MG: 75 TABLET ORAL at 08:00

## 2024-02-20 RX ADMIN — GABAPENTIN 300 MG: 300 CAPSULE ORAL at 08:00

## 2024-02-20 RX ADMIN — ATORVASTATIN CALCIUM 80 MG: 80 TABLET, FILM COATED ORAL at 08:00

## 2024-02-20 RX ADMIN — PIPERACILLIN SODIUM AND TAZOBACTAM SODIUM 3.38 G: 36; 4.5 INJECTION, POWDER, LYOPHILIZED, FOR SOLUTION INTRAVENOUS at 04:01

## 2024-02-20 RX ADMIN — HEPARIN SODIUM 5000 UNITS: 5000 INJECTION INTRAVENOUS; SUBCUTANEOUS at 14:08

## 2024-02-20 RX ADMIN — AMLODIPINE BESYLATE 10 MG: 10 TABLET ORAL at 08:00

## 2024-02-20 RX ADMIN — PIPERACILLIN SODIUM AND TAZOBACTAM SODIUM 3.38 G: 36; 4.5 INJECTION, POWDER, LYOPHILIZED, FOR SOLUTION INTRAVENOUS at 09:19

## 2024-02-20 RX ADMIN — INSULIN LISPRO 1 UNITS: 100 INJECTION, SOLUTION INTRAVENOUS; SUBCUTANEOUS at 11:35

## 2024-02-20 RX ADMIN — HEPARIN SODIUM 5000 UNITS: 5000 INJECTION INTRAVENOUS; SUBCUTANEOUS at 05:24

## 2024-02-20 RX ADMIN — PANTOPRAZOLE SODIUM 40 MG: 40 TABLET, DELAYED RELEASE ORAL at 05:24

## 2024-02-20 NOTE — DISCHARGE SUMMARY
[facility]  Discharge- Mathew Rico 1950, 73 y.o. male MRN: [id number]  Unit/Bed#: E5 -01 Encounter: [id number]  Primary Care Provider: Steve Dickerson DO   Date and time admitted to hospital: 2/14/2024 10:41 PM    Urinary tract infection associated with indwelling urethral catheter   Assessment & Plan  UA with leukocytes, WBC, bacteria   Urine culture >100,000 kleb pneumoniae and enterobacter cloace   Suspect kleb colonization as had multiple previous cultures  Klebsiella, Enterobacter both sensitive to fluoroquinolones  With rising WBCs, will treat enterobacter, antibiotics changed to Zosyn  Patient discharged on Levaquin to complete 7-day course    Constipation  Assessment & Plan  Continue scheduled miralax, senna  Now resolved, recommend bowel regimen as above    Chronic diastolic congestive heart failure (HCC)  Assessment & Plan  Wt Readings from Last 3 Encounters:   02/20/24 64.2 kg (141 lb 8.6 oz)   02/12/24 69 kg (152 lb 1.9 oz)   12/12/23 70.6 kg (155 lb 10.3 oz)   Appears compensated  Not on any diuretics    Urinary retention  Assessment & Plan  Chronic due to MS with chronic suprapubic tube in place    Multiple sclerosis (HCC)  Assessment & Plan  Continue neurontin 300 mg BID    Primary hypertension  Assessment & Plan  Continue home propanolol 60 mg daily and norvasc 10 mg daily     Chronic suprapubic catheter (HCC)  Assessment & Plan  Hx of Multiple sclerosis with chronic indwelling dela cruz catherer.   Hospitalized 2/12 presenting with suprapubic cath dysfunction - At home, cath unable to be passed.   Exchanged 2/12 by urology in ED and discharged home  Currently draining without issues    * Stercoral colitis  Assessment & Plan  Presented with abdomen pain.   Ct abdomen shows increased stool within the visualized colon, additionally rectal vault with hard stool and thickening of the rectal wall suggestive of sterile coral colitis  Patient had multiple BMs on  02/18  Reports daily BM now, recommend continue bowel regimen with MiraLAX and senna      Sepsis, present on admission, in the setting of UTI, as evidenced by leukocytosis (WBC 13.33) and tachycardia (), requiring IV abx    Discharging Physician / Practitioner: Mahad Perea MD  PCP: Steve Dickerson DO  Admission Date:   Admission Orders (From admission, onward)       Ordered        02/15/24 0116  INPATIENT ADMISSION  Once                          Discharge Date: 02/20/24    Medical Problems       Resolved Problems  Date Reviewed: 2/20/2024            Resolved    At risk for UTI related to indwelling catheter 2/15/2024     Resolved by  Petra Luong MD          Consultations During Hospital Stay:  PT/OT    Procedures Performed:   none    Significant Findings / Test Results:   XR chest 1 view portable    Result Date: 2/15/2024  Impression: No acute cardiopulmonary disease. Workstation performed: [id number]     CT abdomen pelvis w contrast    Result Date: 2/15/2024  Impression: Increased stool within the visualized colon. Correlate clinically for constipation. Additionally at the rectal vault there is hard and stool with thickening of the rectal wall suggestive of stercoral colitis. Suprapubic catheter is in place. There is bladder wall thickening which could be due to nondistention, however correlate clinically and with urinalysis to evaluate for possible cystitis. Workstation performed: [id number]        Incidental Findings:   none     Test Results Pending at Discharge (will require follow up):   none     Outpatient Tests Requested:  none    Complications:  none    Reason for Admission: Abdominal Pain    Hospital Course:     Mathew Rico is a 73 y.o. male patient who originally presented to the hospital on 2/14/2024 due to abdominal pain.  Patient with past medical history of MS, neurogenic bladder with chronic suprapubic catheter.  Initial CT imaging showing increased stool burden within the colon  "suggestive of sterile coral colitis.  Patient was given a bowel regimen including MiraLAX, senna and monitor overnight for which he had multiple multiple bowel movements.  His abdominal pain ultimately resolved and recommended MiraLAX and senna daily.    CT also did show some evidence of possible cystitis with bladder wall thickening.  UA concerning for possible infection and patient did have abdominal pain which was suspected due to his constipation.  Treated empirically with IV antibiotics but urine cultures grew Enterobacter and Klebsiella.  Had multiple previous urine cultures growing Klebsiella, suspect likely colonization.  Enterobacter was MDRO and antibiotics were changed from ceftriaxone to Zosyn.  His white count was also elevated, possible from UTI or reactive from likely significant constipation which had resolved.  WBC was trending on day of discharge, antibiotics were switched to Levaquin as Enterobacter and Klebsiella were both sensitive to Levaquin.    Family was unsure if they were able to care for patient going forward.  PT and OT did recommend rehab and family will reassess after therapy completed.    Please see above list of diagnoses and related plan for additional information.     Condition at Discharge: fair     Discharge Day Visit / Exam:     Subjective:    No events overnight, no complaints.  Continues to have bowel movements with abdominal pain now resolved.  Denies any fevers, chills.  Vitals: Blood Pressure: 126/53 (02/20/24 0715)  Pulse: 80 (02/20/24 0715)  Temperature: 98 °F (36.7 °C) (02/20/24 0715)  Temp Source: Oral (02/20/24 0715)  Respirations: 18 (02/20/24 0715)  Height: 5' 7\" (170.2 cm) (02/15/24 0208)  Weight - Scale: 64.2 kg (141 lb 8.6 oz) (02/20/24 0600)  SpO2: 94 % (02/20/24 0715)  Exam:   Physical Exam  Vitals and nursing note reviewed.   Constitutional:       Appearance: Normal appearance.   HENT:      Head: Normocephalic.   Eyes:      Conjunctiva/sclera: Conjunctivae " normal.   Cardiovascular:      Rate and Rhythm: Normal rate.   Pulmonary:      Effort: Pulmonary effort is normal. No respiratory distress.   Abdominal:      General: Bowel sounds are normal. There is no distension.      Palpations: Abdomen is soft.   Genitourinary:     Comments: Suprapubic dela cruz  Musculoskeletal:         General: No swelling. Normal range of motion.   Skin:     General: Skin is warm and dry.   Neurological:      General: No focal deficit present.      Mental Status: He is alert. Mental status is at baseline.         Discharge instructions/Information to patient and family:   See after visit summary for information provided to patient and family.      Provisions for Follow-Up Care:  See after visit summary for information related to follow-up care and any pertinent home health orders.      Disposition:     Acute Rehab at [facility]     Discharge Statement:  I spent 40 minutes discharging the patient. This time was spent on the day of discharge. I had direct contact with the patient on the day of discharge. Greater than 50% of the total time was spent examining patient, answering all patient questions, arranging and discussing plan of care with patient as well as directly providing post-discharge instructions.  Additional time then spent on discharge activities.    Discharge Medications:  See after visit summary for reconciled discharge medications provided to patient and family.      ** Please Note: This note has been constructed using a voice recognition system **

## 2024-02-20 NOTE — PLAN OF CARE
Problem: PAIN - ADULT  Goal: Verbalizes/displays adequate comfort level or baseline comfort level  Description: Interventions:  - Encourage patient to monitor pain and request assistance  - Assess pain using appropriate pain scale  - Administer analgesics based on type and severity of pain and evaluate response  - Implement non-pharmacological measures as appropriate and evaluate response  - Consider cultural and social influences on pain and pain management  - Notify physician/advanced practitioner if interventions unsuccessful or patient reports new pain  Outcome: Progressing     Problem: INFECTION - ADULT  Goal: Absence or prevention of progression during hospitalization  Description: INTERVENTIONS:  - Assess and monitor for signs and symptoms of infection  - Monitor lab/diagnostic results  - Monitor all insertion sites, i.e. indwelling lines, tubes, and drains  - Monitor endotracheal if appropriate and nasal secretions for changes in amount and color  - [place] appropriate cooling/warming therapies per order  - Administer medications as ordered  - Instruct and encourage patient and family to use good hand hygiene technique  - Identify and instruct in appropriate isolation precautions for identified infection/condition  Outcome: Progressing     Problem: SAFETY ADULT  Goal: Patient will remain free of falls  Description: INTERVENTIONS:  - Educate patient/family on patient safety including physical limitations  - Instruct patient to call for assistance with activity   - Consult OT/PT to assist with strengthening/mobility   - Keep Call bell within reach  - Keep bed low and locked with side rails adjusted as appropriate  - Keep care items and personal belongings within reach  - Initiate and maintain comfort rounds  - Make Fall Risk Sign visible to staff  - Offer Toileting every 3 Hours, in advance of need  - Initiate/Maintain bed alarm  - Obtain necessary fall risk management equipment  - Apply yellow socks and  bracelet for high fall risk patients  - Consider moving patient to room near nurses station  Outcome: Progressing  Goal: Maintain or return to baseline ADL function  Description: INTERVENTIONS:  -  Assess patient's ability to carry out ADLs; assess patient's baseline for ADL function and identify physical deficits which impact ability to perform ADLs (bathing, care of mouth/teeth, toileting, grooming, dressing, etc.)  - Assess/evaluate cause of self-care deficits   - Assess range of motion  - Assess patient's mobility; develop plan if impaired  - Assess patient's need for assistive devices and provide as appropriate  - Encourage maximum independence but intervene and supervise when necessary  - Involve family in performance of ADLs  - Assess for home care needs following discharge   - Consider OT consult to assist with ADL evaluation and planning for discharge  - Provide patient education as appropriate  Outcome: Progressing  Goal: Maintains/Returns to pre admission functional level  Description: INTERVENTIONS:  - Perform AM-PAC 6 Click Basic Mobility/ Daily Activity assessment daily.  - Set and communicate daily mobility goal to care team and patient/family/caregiver.   - Collaborate with rehabilitation services on mobility goals if consulted  - Perform Range of Motion 3 times a day.  - Reposition patient every 3 hours.  - Dangle patient 3 times a day  - Stand patient 3 times a day  - Ambulate patient 3 times a day  - Out of bed to chair 3 times a day   - Out of bed for meals 3  Problem: DISCHARGE PLANNING  Goal: Discharge to home or other facility with appropriate resources  Description: INTERVENTIONS:  - Identify barriers to discharge w/patient and caregiver  - Arrange for needed discharge resources and transportation as appropriate  - Identify discharge learning needs (meds, wound care, etc.)  - Arrange for interpretive services to assist at discharge as needed  - Refer to Case Management Department for  coordinating discharge planning if the patient needs post-hospital services based on physician/advanced practitioner order or complex needs related to functional status, cognitive ability, or social support system  Outcome: Progressing     Problem: Knowledge Deficit  Goal: Patient/family/caregiver demonstrates understanding of disease process, treatment plan, medications, and discharge instructions  Description: Complete learning assessment and assess knowledge base.  Interventions:  - Provide teaching at level of understanding  - Provide teaching via preferred learning methods  Outcome: Progressing     Problem: Prexisting or High Potential for Compromised Skin Integrity  Goal: Skin integrity is maintained or improved  Description: INTERVENTIONS:  - Identify patients at risk for skin breakdown  - Assess and monitor skin integrity  - Assess and monitor nutrition and hydration status  - Monitor labs   - Assess for incontinence   - Turn and reposition patient  - Assist with mobility/ambulation  - Relieve pressure over bony prominences  - Avoid friction and shearing  - Provide appropriate hygiene as needed including keeping skin clean and dry  - Evaluate need for skin moisturizer/barrier cream  - Collaborate with interdisciplinary team   - Patient/family teaching  - Consider wound care consult   Outcome: Progressing     Problem: Nutrition/Hydration-ADULT  Goal: Nutrient/Hydration intake appropriate for improving, restoring or maintaining nutritional needs  Description: Monitor and assess patient's nutrition/hydration status for malnutrition. Collaborate with interdisciplinary team and initiate plan and interventions as ordered.  Monitor patient's weight and dietary intake as ordered or per policy. Utilize nutrition screening tool and intervene as necessary. Determine patient's food preferences and provide high-protein, high-caloric foods as appropriate.     INTERVENTIONS:  - Monitor oral intake, urinary output, labs,  and treatment plans  - Assess nutrition and hydration status and recommend course of action  - Evaluate amount of meals eaten  - Assist patient with eating if necessary   - Allow adequate time for meals  - Recommend/ encourage appropriate diets, oral nutritional supplements, and vitamin/mineral supplements  - Order, calculate, and assess calorie counts as needed  - Recommend, monitor, and adjust tube feedings and TPN/PPN based on assessed needs  - Assess need for intravenous fluids  - Provide specific nutrition/hydration education as appropriate  - Include patient/family/caregiver in decisions related to nutrition  Outcome: Progressing    times a day  - Out of bed for toileting  - Record patient progress and toleration of activity level   Outcome: Progressing

## 2024-02-20 NOTE — CASE MANAGEMENT
Case Management Discharge Planning Note    Patient name Mahtew Rico  Location [facility] /E5 -* MRN [id number]  : 1950 Date 2024       Current Admission Date: 2024  Current Admission Diagnosis:Stercoral colitis   Patient Active Problem List    Diagnosis Date Noted    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    Urinary tract infection associated with indwelling urethral catheter  02/15/2024    Fall 2023    Weakness 2023    Accidental fall from chair 2023    Constipation 2023    Chronic diastolic congestive heart failure (HCC) 2023    Hyponatremia 2023    Excessive daytime sleepiness 2022    Shortness of breath 2022    Pancreatic mass 2020    Pancreatic lesion 2020    Bladder stones 2019    Bladder neck contracture 2019    History of CVA (cerebrovascular accident) 10/21/2018    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HCC) 2017    Diabetic neuropathy (HCC) 2016    Chronic suprapubic catheter (HCC) 2016    Thyroid nodule 2015    Cervical spinal stenosis 2014    Generalized anxiety disorder 2014    Urinary retention 2014    Obstructive sleep apnea 2013    Benign colon polyp 2012    Esophageal reflux 2012    Fatty liver 2012    Glaucoma 2012    Hyperlipidemia 2012    Multiple sclerosis (HCC) 2012    Type 2 diabetes mellitus with hyperglycemia, without long-term current use of insulin (HCC) 2012    Primary hypertension 2012      LOS (days): 5  Geometric Mean LOS (GMLOS) (days): 3.3  Days to GMLOS:-2.1     OBJECTIVE:  Risk of Unplanned Readmission Score: 31.6         Current admission status: Inpatient   Preferred Pharmacy:   [facility]/pharmacy #[id number] - [place] - [id number] [facility] [address]  Phone: [phone] Fax: [phone]    [place] [facility]  [address] [facility]  [address]  Phone: [phone] Fax: [phone]    [facility] - [place] [place] - [address],  [address],  [address] [place]  [place] [address]  Phone: [phone] Fax: [phone]    Primary Care Provider: Steve Dickerson DO    Primary Insurance: [facility]  Secondary Insurance:     DISCHARGE DETAILS:    Discharge planning discussed with:: Patient's brother Guzman  Freedom of Choice: Yes  Comments - Freedom of Choice: [facility]  CM contacted family/caregiver?: Yes  Were Treatment Team discharge recommendations reviewed with patient/caregiver?: Yes  Did patient/caregiver verbalize understanding of patient care needs?: N/A- going to facility  Were patient/caregiver advised of the risks associated with not following Treatment Team discharge recommendations?: Yes    Contacts  Patient Contacts: Guzman Rico (Brother) [phone] at bedside  Relationship to Patient:: Family  Contact Method: Phone  Phone Number: Guzman Rico (Brother) [phone] at bedside  Reason/Outcome: Continuity of Care, Emergency Contact, Discharge Planning    Treatment Team Recommendation: Short Term Rehab  Discharge Destination Plan:: Short Term Rehab  Transport at Discharge : [facility]  Dispatcher Contacted: Yes  Number/Name of Dispatcher: KENNY [id number]  Transported by (Company and Unit #): [facility]  ETA of Transport (Date): 02/20/24  ETA of Transport (Time):  (1330)     Additional Comments: BETO spoke with liaison Arina carter 2755 at [facility] [phone]. Arina advised patient could go to [facility] today and just wanted to know which bldg. CM left voicemail advising [facility]. Arina requested we use [facility], CM requested [facility] to try [facility] in the notes in [facility]. CM requested 1330 transport. Report numbers in for the nurse, RN at bedside aware of arrangesments as well as liaison at [facility]    Accepting Facility Name,  City & State : [facility]  Receiving Facility/Agency Phone Number: [phone]  Facility/Agency Fax Number: [phone]

## 2024-02-20 NOTE — ASSESSMENT & PLAN NOTE
Presented with abdomen pain.   Ct abdomen shows increased stool within the visualized colon, additionally rectal vault with hard stool and thickening of the rectal wall suggestive of sterile coral colitis  Patient had multiple BMs on 02/18  Reports daily BM now, recommend continue bowel regimen with MiraLAX and senna

## 2024-02-20 NOTE — ASSESSMENT & PLAN NOTE
Wt Readings from Last 3 Encounters:   02/20/24 64.2 kg (141 lb 8.6 oz)   02/12/24 69 kg (152 lb 1.9 oz)   12/12/23 70.6 kg (155 lb 10.3 oz)   Appears compensated  Not on any diuretics

## 2024-02-20 NOTE — PLAN OF CARE
Problem: PAIN - ADULT  Goal: Verbalizes/displays adequate comfort level or baseline comfort level  Description: Interventions:  - Encourage patient to monitor pain and request assistance  - Assess pain using appropriate pain scale  - Administer analgesics based on type and severity of pain and evaluate response  - Implement non-pharmacological measures as appropriate and evaluate response  - Consider cultural and social influences on pain and pain management  - Notify physician/advanced practitioner if interventions unsuccessful or patient reports new pain  Outcome: Progressing     Problem: INFECTION - ADULT  Goal: Absence or prevention of progression during hospitalization  Description: INTERVENTIONS:  - Assess and monitor for signs and symptoms of infection  - Monitor lab/diagnostic results  - Monitor all insertion sites, i.e. indwelling lines, tubes, and drains  - Monitor endotracheal if appropriate and nasal secretions for changes in amount and color  - [place] appropriate cooling/warming therapies per order  - Administer medications as ordered  - Instruct and encourage patient and family to use good hand hygiene technique  - Identify and instruct in appropriate isolation precautions for identified infection/condition  Outcome: Progressing     Problem: SAFETY ADULT  Goal: Patient will remain free of falls  Description: INTERVENTIONS:  - Educate patient/family on patient safety including physical limitations  - Instruct patient to call for assistance with activity   - Consult OT/PT to assist with strengthening/mobility   - Keep Call bell within reach  - Keep bed low and locked with side rails adjusted as appropriate  - Keep care items and personal belongings within reach  - Initiate and maintain comfort rounds  - Make Fall Risk Sign visible to staff  - Apply yellow socks and bracelet for high fall risk patients  - Consider moving patient to room near nurses station  Outcome: Progressing     Problem: DISCHARGE  PLANNING  Goal: Discharge to home or other facility with appropriate resources  Description: INTERVENTIONS:  - Identify barriers to discharge w/patient and caregiver  - Arrange for needed discharge resources and transportation as appropriate  - Identify discharge learning needs (meds, wound care, etc.)  - Arrange for interpretive services to assist at discharge as needed  - Refer to Case Management Department for coordinating discharge planning if the patient needs post-hospital services based on physician/advanced practitioner order or complex needs related to functional status, cognitive ability, or social support system  Outcome: Progressing     Problem: Knowledge Deficit  Goal: Patient/family/caregiver demonstrates understanding of disease process, treatment plan, medications, and discharge instructions  Description: Complete learning assessment and assess knowledge base.  Interventions:  - Provide teaching at level of understanding  - Provide teaching via preferred learning methods  Outcome: Progressing     Problem: Nutrition/Hydration-ADULT  Goal: Nutrient/Hydration intake appropriate for improving, restoring or maintaining nutritional needs  Description: Monitor and assess patient's nutrition/hydration status for malnutrition. Collaborate with interdisciplinary team and initiate plan and interventions as ordered.  Monitor patient's weight and dietary intake as ordered or per policy. Utilize nutrition screening tool and intervene as necessary. Determine patient's food preferences and provide high-protein, high-caloric foods as appropriate.     INTERVENTIONS:  - Monitor oral intake, urinary output, labs, and treatment plans  - Assess nutrition and hydration status and recommend course of action  - Evaluate amount of meals eaten  - Assist patient with eating if necessary   - Allow adequate time for meals  - Recommend/ encourage appropriate diets, oral nutritional supplements, and vitamin/mineral supplements  -  Order, calculate, and assess calorie counts as needed  - Recommend, monitor, and adjust tube feedings and TPN/PPN based on assessed needs  - Assess need for intravenous fluids  - Provide specific nutrition/hydration education as appropriate  - Include patient/family/caregiver in decisions related to nutrition  Outcome: Progressing

## 2024-02-20 NOTE — ASSESSMENT & PLAN NOTE
UA with leukocytes, WBC, bacteria   Urine culture >100,000 kleb pneumoniae and enterobacter cloace   Suspect kleb colonization as had multiple previous cultures  Klebsiella, Enterobacter both sensitive to fluoroquinolones  With rising WBCs, will treat enterobacter, antibiotics changed to Zosyn  Patient discharged on Levaquin to complete 7-day course

## 2024-02-20 NOTE — PLAN OF CARE
Problem: OCCUPATIONAL THERAPY ADULT  Goal: Performs self-care activities at highest level of function for planned discharge setting.  See evaluation for individualized goals.  Description: Treatment Interventions: ADL retraining, Functional transfer training, UE strengthening/ROM, Endurance training, Patient/family training, Equipment evaluation/education, Compensatory technique education, Continued evaluation, Activityengagement          See flowsheet documentation for full assessment, interventions and recommendations.   Outcome: Progressing  Note: Limitation: Decreased ADL status, Decreased UE strength, Decreased Safe judgement during ADL, Decreased cognition, Decreased endurance, Decreased self-care trans, Decreased high-level ADLs  Prognosis: Fair  Assessment: Pt seen for OT treatment session focusing on functional activity tolerance, bed mobility, ADLs, functional transfers/mobility, UE ROM/strengthening, and Safe transfer techniques. Pt alert and cooperative throughout session. Pt lying supine at start of session. UE exercises completed while lying supine to increase UE ROM/strength needed for ADLs/transfers. Pt tolerated well, no c/o pain or fatigue. Grooming tasks completed w/ Supervision w/ setup required and increased time to complete. UB dressing completed w/ Min A with assist to bring gown over shldrs/down in back. Bed mobility (supine>sit) completed w/ Mod A of 2 with assist for LE management and trunk support. Transfers (sit<>stand) completed w/ Mod A of 2 w/ use of Quick Move. Pt required assist to initiate forward weight shift from surface for sit>stand and assist for controlled descent to surface for stand>sit. Assist required for placement/positioning of B/L LEs on Quick Move. Pt able to complete 3 sit<>stand transfers w/ use of Quick Move. +Retropulsion sitting EOB. Pt impulsive, requiring max verbal cues for upright seated position however decreased carryover demonstrated. Dependent Ax2  required for sit>supine for optimal pt safety. Pt lying supine with bed alarm activated at end of session. Call bell and phone within reach. All needs met and pt reports no further questions for OT at this time. The patient's raw score on the AM-PAC Daily Activity Inpatient Short Form is 14. A raw score of less than 19 suggests the patient may benefit from discharge to post-acute rehabilitation services. Please refer to the recommendation of the Occupational Therapist for safe discharge planning. OT to follow pt on caseload.     Rehab Resource Intensity Level, OT: II (Moderate Resource Intensity)

## 2024-02-20 NOTE — PLAN OF CARE
Problem: PHYSICAL THERAPY ADULT  Goal: Performs mobility at highest level of function for planned discharge setting.  See evaluation for individualized goals.  Description: Treatment/Interventions: Functional transfer training, LE strengthening/ROM, Therapeutic exercise, Endurance training, Patient/family training, Bed mobility, Gait training, Spoke to nursing, Spoke to case management, OT          See flowsheet documentation for full assessment, interventions and recommendations.  Note: Prognosis: Guarded  Problem List: Decreased strength, Impaired balance, Decreased mobility, Decreased cognition, Impaired judgement, Decreased safety awareness, Impaired tone  Assessment: Mathew was seen for a f/u session. Assessment of mobility this session using quick move device, as pt caregivers utilize a sit<>stand machine at home to transf pt. Performed x3 transfers, fluctuating between min-mod Ax2. Does require much assistance to set up transf and maintain balance in seated position. With frequent impulsive behaviors resulting in LOB at EOB. Unclear level of physical assist pt requires at baseline, but would be concerned for caregiver burden at fall risk at current LOF. Will continue to benefit from PT services to facilitate recovery and optimize mobility for safe dc home.  Barriers to Discharge: Other (Comment)  Barriers to Discharge Comments: unclear caregiver (physical) abilities  Rehab Resource Intensity Level, PT: II (Moderate Resource Intensity)    See flowsheet documentation for full assessment.

## 2024-02-20 NOTE — PROGRESS NOTES
Patient:  FELIPE LARIOS    MRN:  [id number]    Aidin Request ID:  [id number]    Level of care reserved:  Skilled Nursing Facility    Partner Reserved:  [facility] , [place] [address] [phone]    Clinical needs requested:    Geography searched:  20 miles around [address]    Start of Service:    Request sent:  11:56am EST on 2/16/2024 by Yash Wheat    Partner reserved:  10:45am EST on 2/20/2024 by Yash Wheat    Choice list shared:  10:43am EST on 2/20/2024 by Yash Wheat

## 2024-02-20 NOTE — PHYSICAL THERAPY NOTE
PHYSICAL THERAPY NOTE          Patient Name: Mathew Rico  Today's Date: 2/20/2024 02/20/24 1149   PT Last Visit   PT Visit Date 02/20/24   Note Type   Note Type Treatment   Pain Assessment   Pain Assessment Tool 0-10   Pain Score No Pain   Restrictions/Precautions   Other Precautions Contact/isolation;Cognitive;Chair Alarm;Bed Alarm;Multiple lines;Fall Risk   General   Chart Reviewed Yes   Response to Previous Treatment Patient with no complaints from previous session.   Cognition   Overall Cognitive Status Impaired   Arousal/Participation Cooperative   Attention Attends with cues to redirect   Orientation Level Oriented to person   Following Commands Follows one step commands with increased time or repetition   Comments impulsive, requries frequent cuing for safety   Bed Mobility   Rolling R 5  Supervision   Additional items Bedrails   Rolling L 5  Supervision   Additional items Bedrails   Supine to Sit 3  Moderate assistance   Additional items Assist x 2;HOB elevated;Bedrails;Increased time required;Verbal cues;LE management;Other  (trunk support)   Sit to Supine 1  Dependent   Additional items Assist x 2;Verbal cues;Impulsive;LE management;Other  (trunk support)   Transfers   Sit to Stand 3  Moderate assistance   Additional items Assist x 2;Verbal cues;Other   Stand to Sit 3  Moderate assistance   Additional items Assist x 2;Increased time required;Impulsive;Verbal cues;Other   Additional Comments use of quick move (sit<>stand machine) for transfers. performed x3 w quick move. requires assist for set up including BLE positioning, hand placement, and to support trunk during transition.   Balance   Static Sitting   (fair - to poor - (following transf). retropulsive)   Static Standing Zero  (use of quick move machine)   Activity Tolerance   Activity Tolerance Treatment limited secondary to medical complications (Comment)   Medical Staff Made Aware Kary OT   Nurse Made  Renita Bae RN   Assessment   Prognosis Guarded   Problem List Decreased strength;Impaired balance;Decreased mobility;Decreased cognition;Impaired judgement;Decreased safety awareness;Impaired tone   Assessment Mathew was seen for a f/u session. Assessment of mobility this session using quick move device, as pt caregivers utilize a sit<>stand machine at home to transf pt. Performed x3 transfers, fluctuating between min-mod Ax2. Does require much assistance to set up transf and maintain balance in seated position. With frequent impulsive behaviors resulting in LOB at EOB. Unclear level of physical assist pt requires at baseline, but would be concerned for caregiver burden at fall risk at current LOF. Will continue to benefit from PT services to facilitate recovery and optimize mobility for safe dc home.   Barriers to Discharge Other (Comment)   Barriers to Discharge Comments unclear caregiver (physical) abilities   Goals   Patient Goals get oob more   STG Expiration Date 03/01/24   Short Term Goal #1 Goals to be met in 14 days; pt will be able to: 1) inc strength & balance by 1/2 grade to improve overall functional mobility & dec fall risk; 2) inc bed mobility to minAx1 for pt to be able to get in/OOB safely w/ proper techniques 100% of the time, to dec caregiver burden & safely function at home; 3) inc transfers to modAx1 for pt to transition safely from one surface to another w/o % of the time, to dec caregiver burden & safely function at home; 4) inc amb w/ RW approx. >20' w/ modAx1 for pt to ambulate as tolerated w/o any % of the time, to dec caregiver burden & safely function at home; 5) minAx1 for w/c mobility & management on level surface approx. >150'; pt/caregiver ed   PT Treatment Day 1   Plan   Treatment/Interventions Functional transfer training;LE strengthening/ROM;Therapeutic exercise;Cognitive reorientation;Patient/family training;Equipment eval/education;Bed mobility;Compensatory  technique education;Continued evaluation;Spoke to nursing;OT   Progress Slow progress, decreased activity tolerance   PT Frequency 2-3x/wk   Discharge Recommendation   Rehab Resource Intensity Level, PT II (Moderate Resource Intensity)   Equipment Recommended   (asim coronel)   AM-PAC Basic Mobility Inpatient   Turning in Flat Bed Without Bedrails 3   Lying on Back to Sitting on Edge of Flat Bed Without Bedrails 1   Moving Bed to Chair 1   Standing Up From Chair Using Arms 1   Walk in Room 1   Climb 3-5 Stairs With Railing 1   Basic Mobility Inpatient Raw Score 8   Turning Head Towards Sound 3   Follow Simple Instructions 2   Low Function Basic Mobility Raw Score  13   Low Function Basic Mobility Standardized Score  20.14   Highest Level Of Mobility   JH-HLM Goal 3: Sit at edge of bed   JH-HLM Achieved 4: Move to chair/commode   Education   Education Provided Mobility training;Assistive device   Patient Reinforcement needed   End of Consult   Patient Position at End of Consult Supine;Bed/Chair alarm activated;All needs within reach       Roma Larose, PT

## 2024-02-20 NOTE — OCCUPATIONAL THERAPY NOTE
Occupational Therapy Progress Note     Patient Name: aMthew Rico  Today's Date: 2/20/2024  Problem List  Principal Problem:    Stercoral colitis  Active Problems:    Diabetic neuropathy (HCC)    Chronic suprapubic catheter (HCC)    Esophageal reflux    Generalized anxiety disorder    Primary hypertension    Multiple sclerosis (HCC)    Urinary retention    Chronic diastolic congestive heart failure (HCC)    Constipation    Urinary tract infection associated with indwelling urethral catheter         02/20/24 1148   OT Last Visit   OT Visit Date 02/20/24   Note Type   Note Type Treatment   Pain Assessment   Pain Assessment Tool 0-10   Pain Score No Pain   Restrictions/Precautions   Weight Bearing Precautions Per Order No   Other Precautions Contact/isolation;Cognitive;Chair Alarm;Bed Alarm;Fall Risk;Multiple lines;Impulsive   ADL   Where Assessed Supine, bed   Grooming Assistance 5  Supervision/Setup   Grooming Deficit Setup;Increased time to complete;Supervision/safety   UB Dressing Assistance 4  Minimal Assistance   UB Dressing Deficit Setup;Verbal cueing;Supervision/safety;Increased time to complete;Pull around back;Pull down in back   LB Dressing Assistance 1  Total Assistance   LB Dressing Deficit Don/doff R sock;Don/doff L sock   Bed Mobility   Rolling R 5  Supervision   Additional items Bedrails   Rolling L 5  Supervision   Additional items Bedrails   Supine to Sit 3  Moderate assistance   Additional items Assist x 2;HOB elevated;Bedrails;Increased time required;Verbal cues;LE management  (trunk support)   Sit to Supine 1  Dependent   Additional items Assist x 2;Increased time required;Verbal cues;LE management  (trunk support)   Additional Comments Pt lying supine with bed alarm activated at end of session. Call bell and phone within reach. All needs met and pt reports no further questions for OT at this time.   Transfers   Sit to Stand 3  Moderate assistance   Additional items Assist x 2;Increased time  required;Verbal cues  (Mod-Min A of 2)   Stand to Sit 3  Moderate assistance   Additional items Assist x 2;Verbal cues;Impulsive   Additional Comments Use of Quick Move. Pt able to perform 3 sit<>stand transfers w/ use of Quick Move   Therapeutic Excerise-Strength   UE Strength Yes   Right Upper Extremity- Strength   R Shoulder Flexion;Extension;Horizontal ABduction   R Elbow Elbow flexion;Elbow extension   R Position Supine   R Weight/Reps/Sets 1 set of 10 reps   Left Upper Extremity-Strength   L Shoulder Flexion;Extension;Horizontal ABduction   L Elbow Elbow flexion;Elbow extension   L Position Supine   L Weights/Reps/Sets 1 set of 10 reps   Cognition   Overall Cognitive Status Impaired   Arousal/Participation Alert;Cooperative   Attention Attends with cues to redirect   Orientation Level Oriented to person;Disoriented to time;Disoriented to situation  (general to place- hospital)   Memory Decreased recall of precautions;Decreased recall of recent events;Decreased short term memory   Following Commands Follows one step commands with increased time or repetition   Activity Tolerance   Activity Tolerance Patient limited by fatigue;Other (Comment)  (weakness, cognitive deficits)   Medical Staff Made Aware Roma PT; Catalino RN   Assessment   Assessment Pt seen for OT treatment session focusing on functional activity tolerance, bed mobility, ADLs, functional transfers/mobility, UE ROM/strengthening, and Safe transfer techniques. Pt alert and cooperative throughout session. Pt lying supine at start of session. UE exercises completed while lying supine to increase UE ROM/strength needed for ADLs/transfers. Pt tolerated well, no c/o pain or fatigue. Grooming tasks completed w/ Supervision w/ setup required and increased time to complete. UB dressing completed w/ Min A with assist to bring gown over shldrs/down in back. Bed mobility (supine>sit) completed w/ Mod A of 2 with assist for LE management and trunk support.  Transfers (sit<>stand) completed w/ Mod A of 2 w/ use of Quick Move. Pt required assist to initiate forward weight shift from surface for sit>stand and assist for controlled descent to surface for stand>sit. Assist required for placement/positioning of B/L LEs on Quick Move. Pt able to complete 3 sit<>stand transfers w/ use of Quick Move. +Retropulsion sitting EOB. Pt impulsive, requiring max verbal cues for upright seated position however decreased carryover demonstrated. Dependent Ax2 required for sit>supine for optimal pt safety. Pt lying supine with bed alarm activated at end of session. Call bell and phone within reach. All needs met and pt reports no further questions for OT at this time. The patient's raw score on the AM-PAC Daily Activity Inpatient Short Form is 14. A raw score of less than 19 suggests the patient may benefit from discharge to post-acute rehabilitation services. Please refer to the recommendation of the Occupational Therapist for safe discharge planning. OT to follow pt on caseload.   Plan   Treatment Interventions ADL retraining;Functional transfer training;UE strengthening/ROM;Endurance training;Patient/family training;Equipment evaluation/education;Compensatory technique education;Continued evaluation;Activityengagement   Goal Expiration Date 03/01/24   OT Treatment Day 1   OT Frequency 2-3x/wk;3-5x/wk   Discharge Recommendation   Rehab Resource Intensity Level, OT II (Moderate Resource Intensity)   AM-PAC Daily Activity Inpatient   Lower Body Dressing 1   Bathing 2   Toileting 1   Upper Body Dressing 3   Grooming 3   Eating 4   Daily Activity Raw Score 14   Daily Activity Standardized Score (Calc for Raw Score >=11) 33.39   AM-PAC Applied Cognition Inpatient   Following a Speech/Presentation 3   Understanding Ordinary Conversation 3   Taking Medications 1   Remembering Where Things Are Placed or Put Away 2   Remembering List of 4-5 Errands 2   Taking Care of Complicated Tasks 1   Applied  Cognition Raw Score 12   Applied Cognition Standardized Score 28.82       Kary Cruz, OTR/L

## 2024-02-21 PROBLEM — N39.0 URINARY TRACT INFECTION ASSOCIATED WITH INDWELLING URETHRAL CATHETER: Status: RESOLVED | Noted: 2024-02-15 | Resolved: 2024-02-21

## 2024-02-21 PROBLEM — T83.511A URINARY TRACT INFECTION ASSOCIATED WITH INDWELLING URETHRAL CATHETER: Status: RESOLVED | Noted: 2024-02-15 | Resolved: 2024-02-21

## 2024-02-21 NOTE — UTILIZATION REVIEW
NOTIFICATION OF ADMISSION DISCHARGE   This is a Notification of Discharge from [facility]. Please be advised that this patient has been discharge from our facility. Below you will find the admission and discharge date and time including the patient’s disposition.   UTILIZATION REVIEW CONTACT:  Marsha Garcia  Utilization   Network Utilization Review Department  Phone: [phone] x carefully listen to the prompts. All voicemails are confidential.  Email: [email or web address]     ADMISSION INFORMATION  PRESENTATION DATE: 2/14/2024 10:41 PM  OBERVATION ADMISSION DATE:   INPATIENT ADMISSION DATE: 2/15/24  1:16 AM   DISCHARGE DATE: 2/20/2024  4:41 PM   [facility]    Network Utilization Review Department  ATTENTION: Please call with any questions or concerns to [phone] and carefully listen to the prompts so that you are directed to the right person. All voicemails are confidential.   For Discharge needs, contact Care Management DC Support Team at [phone] opt. 2  Send all requests for admission clinical reviews, approved or denied determinations and any other requests to dedicated fax number below belonging to the campus where the patient is receiving treatment. List of dedicated fax numbers for the Facilities:  FACILITY NAME UR FAX NUMBER   ADMISSION DENIALS (Administrative/Medical Necessity) [phone]   DISCHARGE SUPPORT TEAM ([facility]) [phone]   PARENT CHILD HEALTH (Maternity/NICU/Pediatrics) [phone]   [facility] [phone]   [facility] [phone]   [facility] [phone]   [facility] [phone]   [facility] [phone]   [facility] [phone]   [facility] [phone]   [facility]  [phone]   [facility] [phone]   [facility] [phone]   [facility] [phone]   [facility] [phone]

## 2024-02-29 ENCOUNTER — HOSPITAL ENCOUNTER (EMERGENCY)
Facility: HOSPITAL | Age: 74
Discharge: HOME | End: 2024-03-01
Attending: EMERGENCY MEDICINE | Admitting: EMERGENCY MEDICINE
Payer: MEDICARE

## 2024-02-29 DIAGNOSIS — T83.010A SUPRAPUBIC CATHETER DYSFUNCTION (HCC): Primary | ICD-10-CM

## 2024-02-29 DIAGNOSIS — N39.0 COMPLICATED UTI (URINARY TRACT INFECTION): ICD-10-CM

## 2024-02-29 LAB
BACTERIA UR QL AUTO: ABNORMAL /HPF
BILIRUB UR QL STRIP: NEGATIVE
BUDDING YEAST: PRESENT
CLARITY UR: ABNORMAL
COLOR UR: YELLOW
GLUCOSE UR STRIP-MCNC: ABNORMAL MG/DL
HGB UR QL STRIP.AUTO: ABNORMAL
KETONES UR STRIP-MCNC: NEGATIVE MG/DL
LEUKOCYTE ESTERASE UR QL STRIP: ABNORMAL
NITRITE UR QL STRIP: NEGATIVE
NON-SQ EPI CELLS URNS QL MICRO: ABNORMAL /HPF
PH UR STRIP.AUTO: 5.5 [PH] (ref 4.5–8)
PROT UR STRIP-MCNC: ABNORMAL MG/DL
RBC #/AREA URNS AUTO: ABNORMAL /HPF
SP GR UR STRIP.AUTO: 1.02 (ref 1–1.03)
UROBILINOGEN UR QL STRIP.AUTO: 0.2 E.U./DL
WBC #/AREA URNS AUTO: ABNORMAL /HPF
WBC CLUMPS # UR AUTO: PRESENT /UL

## 2024-02-29 PROCEDURE — 99284 EMERGENCY DEPT VISIT MOD MDM: CPT | Performed by: EMERGENCY MEDICINE

## 2024-02-29 PROCEDURE — 87186 SC STD MICRODIL/AGAR DIL: CPT

## 2024-02-29 PROCEDURE — 87086 URINE CULTURE/COLONY COUNT: CPT

## 2024-02-29 PROCEDURE — 87077 CULTURE AEROBIC IDENTIFY: CPT

## 2024-02-29 PROCEDURE — 87106 FUNGI IDENTIFICATION YEAST: CPT

## 2024-02-29 PROCEDURE — 99285 EMERGENCY DEPT VISIT HI MDM: CPT

## 2024-02-29 PROCEDURE — 81001 URINALYSIS AUTO W/SCOPE: CPT

## 2024-02-29 PROCEDURE — 51702 INSERT TEMP BLADDER CATH: CPT | Performed by: EMERGENCY MEDICINE

## 2024-02-29 RX ORDER — LEVOFLOXACIN 750 MG/1
750 TABLET, FILM COATED ORAL DAILY
Qty: 4 TABLET | Refills: 0 | Status: SHIPPED | OUTPATIENT
Start: 2024-03-01 | End: 2024-03-05

## 2024-02-29 RX ADMIN — LEVOFLOXACIN 750 MG: 500 TABLET, FILM COATED ORAL at 22:57

## 2024-03-01 VITALS
BODY MASS INDEX: 22.86 KG/M2 | RESPIRATION RATE: 18 BRPM | HEART RATE: 103 BPM | DIASTOLIC BLOOD PRESSURE: 62 MMHG | TEMPERATURE: 98 F | OXYGEN SATURATION: 99 % | SYSTOLIC BLOOD PRESSURE: 131 MMHG | WEIGHT: 145.94 LBS

## 2024-03-01 LAB — BACTERIA BLD CULT: NORMAL

## 2024-03-01 NOTE — ED PROVIDER NOTES
"History  Chief Complaint   Patient presents with    Medical Problem     Pt states superpubic dela cruz is clogged. Aid came out to his location to attempt to unclog it without success. Pt stated he vomited a few days ago and is C/O abd discomfort     73y M w/ hx of chronic urinary retention w/ long standing SPT.  Tube stopped draining this afternoon and home visiting nurse unable to change it out stating it was stuck. Reportedly tried flushing the tube but still no drainage. Pt reports he requires frequent SPT changes and states it was changed about a week ago. Reports he frequently has UTI when it needs to be changed.  Reports abd pressure/discomfort assoc w/ nausea and diaphoresis.  Before the SPT became clogged pt had no complaints and states he was just relaxing \"watching the [employer] Sto[employer]\".       History provided by:  Patient and medical records   used: No    Medical Problem      Prior to Admission Medications   Prescriptions Last Dose Informant Patient Reported? Taking?   ALPRAZolam (XANAX) 0.25 mg tablet  Self Yes No   Sig: Take 0.25 mg by mouth 2 (two) times a day as needed for anxiety or sleep    Dulaglutide (Trulicity) 0.75 MG/0.5ML SOPN  Self Yes No   Sig: Inject 0.75 mg under the skin once a week   Ergocalciferol (VITAMIN D2 PO)  Self Yes No   Sig: Take 50,000 Units by mouth once a week   albuterol (2.5 mg/3 mL) 0.083 % nebulizer solution   No No   Sig: Take 3 mL (2.5 mg total) by nebulization every 6 (six) hours as needed for wheezing or shortness of breath   albuterol (Ventolin HFA) 90 mcg/act inhaler   No No   Sig: Inhale 2 puffs every 6 (six) hours as needed for wheezing   amLODIPine-atorvastatin (CADUET) 10-80 MG per tablet  Self Yes No   Sig: Take 1 tablet by mouth daily   bisacodyl (DULCOLAX) 10 mg suppository   No No   Sig: Insert 1 suppository (10 mg total) into the rectum daily as needed for constipation   clopidogrel (PLAVIX) 75 mg tablet  Self No No   Sig: Take 1 tablet (75 mg " total) by mouth daily   gabapentin (NEURONTIN) 300 mg capsule  Self No No   Sig: Take 1 capsule (300 mg total) by mouth 2 (two) times a day   gabapentin (NEURONTIN) 300 mg capsule   No No   Sig: Take 2 capsules (600 mg total) by mouth daily at bedtime   latanoprost (XALATAN) 0.005 % ophthalmic solution  Self Yes No   Sig: Administer 1 drop to both eyes daily at bedtime   melatonin 3 mg  Self Yes No   Sig: Take 3 mg by mouth daily at bedtime as needed   pantoprazole (PROTONIX) 40 mg tablet   No No   Sig: TAKE 1 TABLET TWICE DAILY 30 MINUTES BEFORE BREAKFAST AND DINNER.   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[facility])     Bladder infection     Bronchitis     Constipation     Cough     Diabetes mellitus ([facility])     Dizziness     Dysfunction of eustachian tube     Erectile dysfunction of non-organic origin     Fatigue     Glaucoma     Hiatal hernia     Hypertension     Imbalance     Leg muscle spasm     MS (multiple sclerosis) ([facility])     Nephrolithiasis     Neurogenic bladder     No natural teeth     Sinus pain     Spinal stenosis     Strain of thoracic region     Stroke ([facility])     Suprapubic catheter ([facility])        Past Surgical History:   Procedure Laterality Date    APPENDECTOMY      BRAIN SURGERY      Coil placed in aneurysm    CYSTOSCOPY      CYSTOSCOPY      CYSTOSCOPY  06/11/2018    CYSTOSCOPY  01/15/2021    EYE SURGERY      transscleral cyclophotocoagulation  noncontact YAG laser    MYRINGOTOMY      with ventilation tube insertion    CT LITHOLAPAXY SMPL/SM <2.5 CM N/A 2019    Procedure: CYSTOSCOPY, holmium laser litholapaxy of bladder stones, EXCHANGE OF SP TUBE;  Surgeon: Javy Jeffries MD;  Location: [facility];  Service: Urology    SUPRAPUBIC CATHETER INSERTION         Family History   Problem Relation Age of Onset    Heart attack Mother     Stroke Mother     Heart attack Father     Anuerysm Father         In Stomach      I have reviewed and agree with the history as documented.    E-Cigarette/Vaping    E-Cigarette Use Never User      E-Cigarette/Vaping Substances    Nicotine No     THC No     CBD No     Flavoring No     Other No     Unknown No      Social History     Tobacco Use    Smoking status: Former     Current packs/day: 0.00     Average packs/day: 0.5 packs/day for 31.0 years (15.5 ttl pk-yrs)     Types: Cigarettes     Start date:      Quit date:      Years since quittin.1    Smokeless tobacco: Never   Vaping Use    Vaping status: Never Used   Substance Use Topics    Alcohol use: Not Currently    Drug use: No       Review of Systems   All other systems reviewed and are negative.      Physical Exam  Physical Exam  Vitals and nursing note reviewed.   Constitutional:       General: He is not in acute distress.     Appearance: Normal appearance. He is not ill-appearing, toxic-appearing or diaphoretic.   HENT:      Mouth/Throat:      Mouth: Mucous membranes are moist.   Eyes:      Conjunctiva/sclera: Conjunctivae normal.   Cardiovascular:      Rate and Rhythm: Regular rhythm. Tachycardia present.      Heart sounds: No murmur heard.  Pulmonary:      Effort: Pulmonary effort is normal.   Abdominal:      General: There is distension.      Comments: SPT noted, no surrounding erythema or purulent drainage.  Diffuse lower abd tenderness noted   Skin:     General: Skin is warm.      Findings: No erythema.   Neurological:      Mental Status: He is alert.  Mental status is at baseline.   Psychiatric:         Mood and Affect: Mood normal.         Vital Signs  ED Triage Vitals [02/29/24 2100]   Temperature Pulse Respirations Blood Pressure SpO2   98 °F (36.7 °C) (!) 112 18 156/74 98 %      Temp Source Heart Rate Source Patient Position - Orthostatic VS BP Location FiO2 (%)   Oral Monitor Lying Right arm --      Pain Score       --           Vitals:    02/29/24 2100   BP: 156/74   Pulse: (!) 112   Patient Position - Orthostatic VS: Lying         Visual Acuity      ED Medications  Medications   levofloxacin (LEVAQUIN) tablet 750 mg (750 mg Oral Given 2/29/24 2257)       Diagnostic Studies  Results Reviewed       Procedure Component Value Units Date/Time    Urine Microscopic [[id number]]  (Abnormal) Collected: 02/29/24 2126    Lab Status: Final result Specimen: Urine, Suprapubic catheter Updated: 02/29/24 2228     RBC, UA Innumerable /hpf      WBC, UA Innumerable /hpf      Epithelial Cells None Seen /hpf      Bacteria, UA Moderate /hpf      WBC Clumps Present     Budding Yeast Present    Urine culture [[id number]] Collected: 02/29/24 2126    Lab Status: In process Specimen: Urine, Suprapubic catheter Updated: 02/29/24 2228    Urine Macroscopic, POC [[id number]]  (Abnormal) Collected: 02/29/24 2126    Lab Status: Final result Specimen: Urine Updated: 02/29/24 2127     Color, UA Yellow     Clarity, UA Cloudy     pH, UA 5.5     Leukocytes, UA Small     Nitrite, UA Negative     Protein, UA 30 (1+) mg/dl      Glucose, UA >=1000 (1%) mg/dl      Ketones, UA Negative mg/dl      Urobilinogen, UA 0.2 E.U./dl      Bilirubin, UA Negative     Occult Blood, UA Moderate     Specific Gravity, UA 1.020    Narrative:      CLINITEK RESULT                   No orders to display              Procedures  Suprapubic Tube    Date/Time: 2/29/2024 9:10 AM    Performed by: Payton Mcelroy DO  Authorized by: Payton Mcelroy DO    Patient location:  ED  Other Assisting Provider: No    Consent:      Consent obtained:  Verbal    Consent given by:  Patient    Risks discussed:  Bleeding and pain    Alternatives discussed:  No treatment  Universal protocol:     Patient identity confirmed:  Verbally with patient  Anesthesia (see MAR for exact dosages):     Anesthesia method:  None  Procedure details:     Complexity:  Simple    Aspiration by:  Catheter    Catheter type:  Cage    Catheter size:  16 Fr    Ultrasound guidance: no      Number of attempts:  1    Successful placement: yes      Urine characteristics:  Mildly cloudy  Post-procedure details:     Patient tolerance of procedure:  Tolerated well, no immediate complications  Comments:      Existing SPT removed and successfully inserted new SPT w/ immediate drainage           ED Course  ED Course as of 02/29/24 2300   Thu Feb 29, 2024 2117 After SPT changed, abd discomfort resolved.   2128 Leukocytes, UA(!): Small   2129 Glucose, UA(!): >=1000 (1%)   2129 Blood, UA(!): Moderate   2233 Bacteria, UA(!): Moderate  Will treat for complicated UTI. Previous cultures w/ klebsiella and enterobacter with levofloxacin being the only agent both are susceptible to.    Will start on PO levaquin and have him f/u w/ urology                                             Medical Decision Making  Malfunctioning SPT - no longer draining. Pt reports hx of UTI when this happens.  Will change out SPT and ck UA to r/o infection and treat if needed. Will have pt f/u w/ his urologist as oupt.  Encouraged to increase his fluid intake    Problems Addressed:  Complicated UTI (urinary tract infection): acute illness or injury with systemic symptoms  Suprapubic catheter dysfunction (HCC): chronic illness or injury with severe exacerbation, progression, or side effects of treatment    Amount and/or Complexity of Data Reviewed  External Data Reviewed: labs and notes.     Details: Outpt notes reviewed  Prior urine culture reviewed  Labs: ordered. Decision-making details documented in ED  Course.    Risk  Prescription drug management.             Disposition  Final diagnoses:   Suprapubic catheter dysfunction (HCC)   Complicated UTI (urinary tract infection)     Time reflects when diagnosis was documented in both MDM as applicable and the Disposition within this note       Time User Action Codes Description Comment    2/29/2024  9:11 PM Payton Mcelroy [T83.010A] Suprapubic catheter dysfunction (HCC)     2/29/2024 10:36 PM Payton Mcelroy Add [N39.0] Complicated UTI (urinary tract infection)           ED Disposition       ED Disposition   Discharge    Condition   Stable    Date/Time   Thu Feb 29, 2024 10:36 PM    Comment   Mathew Rico discharge to home/self care.                   Follow-up Information       Follow up With Specialties Details Why Contact Info Additional Information    [facility] Schedule an appointment as soon as possible for a visit in 1 week for further evaluation and treatment [address] [place] [id number]  [phone] [facility] [address], [place], [place], [address]   [phone]            Patient's Medications   Discharge Prescriptions    LEVOFLOXACIN (LEVAQUIN) 750 MG TABLET    Take 1 tablet (750 mg total) by mouth daily for 4 days Do not start before March 1, 2024.       Start Date: 3/1/2024  End Date: 3/5/2024       Order Dose: 750 mg       Quantity: 4 tablet    Refills: 0       No discharge procedures on file.    PDMP Review         Value Time User    PDMP Reviewed  Yes 8/19/2023  1:22 AM [facility]            ED Provider  Electronically Signed by             Payton Mcelroy,   02/29/24 4861

## 2024-03-03 LAB — BACTERIA UR CULT: ABNORMAL

## 2024-03-04 LAB
BACTERIA UR CULT: ABNORMAL
BACTERIA UR CULT: ABNORMAL

## 2024-03-22 ENCOUNTER — HOSPITAL ENCOUNTER (EMERGENCY)
Facility: HOSPITAL | Age: 74
Discharge: HOME | End: 2024-03-22
Attending: EMERGENCY MEDICINE
Payer: MEDICARE

## 2024-03-22 VITALS
DIASTOLIC BLOOD PRESSURE: 60 MMHG | RESPIRATION RATE: 14 BRPM | OXYGEN SATURATION: 96 % | SYSTOLIC BLOOD PRESSURE: 121 MMHG | HEART RATE: 94 BPM | TEMPERATURE: 98.5 F

## 2024-03-22 DIAGNOSIS — T83.010A SUPRAPUBIC CATHETER DYSFUNCTION, INITIAL ENCOUNTER (HCC): Primary | ICD-10-CM

## 2024-03-22 DIAGNOSIS — R33.9 URINARY RETENTION: ICD-10-CM

## 2024-03-22 LAB
BACTERIA UR QL AUTO: ABNORMAL /HPF
BILIRUB UR QL STRIP: NEGATIVE
BUDDING YEAST: PRESENT
CLARITY UR: ABNORMAL
COLOR UR: YELLOW
GLUCOSE SERPL-MCNC: 148 MG/DL (ref 65–140)
GLUCOSE UR STRIP-MCNC: ABNORMAL MG/DL
HGB UR QL STRIP.AUTO: ABNORMAL
KETONES UR STRIP-MCNC: NEGATIVE MG/DL
LEUKOCYTE ESTERASE UR QL STRIP: ABNORMAL
MUCOUS THREADS UR QL AUTO: ABNORMAL
NITRITE UR QL STRIP: POSITIVE
NON-SQ EPI CELLS URNS QL MICRO: ABNORMAL /HPF
PH UR STRIP.AUTO: 5.5 [PH] (ref 4.5–8)
PROT UR STRIP-MCNC: NEGATIVE MG/DL
RBC #/AREA URNS AUTO: ABNORMAL /HPF
SP GR UR STRIP.AUTO: 1.01 (ref 1–1.03)
UROBILINOGEN UR QL STRIP.AUTO: 0.2 E.U./DL
WBC #/AREA URNS AUTO: ABNORMAL /HPF
WBC CLUMPS # UR AUTO: PRESENT /UL

## 2024-03-22 PROCEDURE — 81001 URINALYSIS AUTO W/SCOPE: CPT

## 2024-03-22 PROCEDURE — 87086 URINE CULTURE/COLONY COUNT: CPT

## 2024-03-22 PROCEDURE — 87106 FUNGI IDENTIFICATION YEAST: CPT

## 2024-03-22 PROCEDURE — 87186 SC STD MICRODIL/AGAR DIL: CPT

## 2024-03-22 PROCEDURE — 87077 CULTURE AEROBIC IDENTIFY: CPT

## 2024-03-22 PROCEDURE — 82948 REAGENT STRIP/BLOOD GLUCOSE: CPT

## 2024-03-22 PROCEDURE — 99283 EMERGENCY DEPT VISIT LOW MDM: CPT

## 2024-03-22 RX ORDER — ACETAMINOPHEN 325 MG/1
650 TABLET ORAL ONCE
Status: COMPLETED | OUTPATIENT
Start: 2024-03-22 | End: 2024-03-22

## 2024-03-22 RX ORDER — NITROFURANTOIN 25; 75 MG/1; MG/1
100 CAPSULE ORAL 2 TIMES DAILY
Qty: 10 CAPSULE | Refills: 0 | Status: SHIPPED | OUTPATIENT
Start: 2024-03-22 | End: 2024-03-29

## 2024-03-22 RX ADMIN — ACETAMINOPHEN 325MG 650 MG: 325 TABLET ORAL at 10:20

## 2024-03-22 NOTE — ED PROVIDER NOTES
History  Chief Complaint   Patient presents with    Urinary Catheter Problem     Pt had suprapubic cath changed yesterday by [facility], no urinary output since. Pt c/o lower abdominal pain and distention.      Mathew is a 73-year-old male who presents to the emergency department with SPT dysfunction since change yesterday.  He reports that he had a home nurse change his suprapubic catheter yesterday.  Patient has frequent visits for catheter dysfunction and obstruction.  He reports that there are has been no flow since insertion.  He has had distention and abdominal discomfort since then. Denies fevers,chills.       Urinary Catheter Problem  Associated symptoms: no abdominal pain, no chest pain, no cough, no fever, no rash, no shortness of breath and no vomiting        Prior to Admission Medications   Prescriptions Last Dose Informant Patient Reported? Taking?   ALPRAZolam (XANAX) 0.25 mg tablet  Self Yes No   Sig: Take 0.25 mg by mouth 2 (two) times a day as needed for anxiety or sleep    Dulaglutide (Trulicity) 0.75 MG/0.5ML SOPN  Self Yes No   Sig: Inject 0.75 mg under the skin once a week   Ergocalciferol (VITAMIN D2 PO)  Self Yes No   Sig: Take 50,000 Units by mouth once a week   albuterol (2.5 mg/3 mL) 0.083 % nebulizer solution   No No   Sig: Take 3 mL (2.5 mg total) by nebulization every 6 (six) hours as needed for wheezing or shortness of breath   albuterol (Ventolin HFA) 90 mcg/act inhaler   No No   Sig: Inhale 2 puffs every 6 (six) hours as needed for wheezing   amLODIPine-atorvastatin (CADUET) 10-80 MG per tablet  Self Yes No   Sig: Take 1 tablet by mouth daily   bisacodyl (DULCOLAX) 10 mg suppository   No No   Sig: Insert 1 suppository (10 mg total) into the rectum daily as needed for constipation   clopidogrel (PLAVIX) 75 mg tablet  Self No No   Sig: Take 1 tablet (75 mg total) by mouth daily   gabapentin (NEURONTIN) 300 mg capsule  Self No No   Sig: Take 1 capsule (300 mg total) by mouth 2 (two) times a  day   gabapentin (NEURONTIN) 300 mg capsule   No No   Sig: Take 2 capsules (600 mg total) by mouth daily at bedtime   latanoprost (XALATAN) 0.005 % ophthalmic solution  Self Yes No   Sig: Administer 1 drop to both eyes daily at bedtime   melatonin 3 mg  Self Yes No   Sig: Take 3 mg by mouth daily at bedtime as needed   pantoprazole (PROTONIX) 40 mg tablet   No No   Sig: TAKE 1 TABLET TWICE DAILY 30 MINUTES BEFORE BREAKFAST AND DINNER.   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[facility])     Bladder infection     Bronchitis     Constipation     Cough     Diabetes mellitus ([facility])     Dizziness     Dysfunction of eustachian tube     Erectile dysfunction of non-organic origin     Fatigue     Glaucoma     Hiatal hernia     Hypertension     Imbalance     Leg muscle spasm     MS (multiple sclerosis) ([facility])     Nephrolithiasis     Neurogenic bladder     No natural teeth     Sinus pain     Spinal stenosis     Strain of thoracic region     Stroke ([facility])     Suprapubic catheter ([facility])        Past Surgical History:   Procedure Laterality Date    APPENDECTOMY      BRAIN SURGERY      Coil placed in aneurysm    CYSTOSCOPY      CYSTOSCOPY      CYSTOSCOPY  06/11/2018    CYSTOSCOPY  01/15/2021    EYE SURGERY      transscleral cyclophotocoagulation noncontact YAG laser    MYRINGOTOMY      with ventilation tube insertion    ID LITHOLAPAXY SMPL/SM <2.5 CM N/A 5/7/2019    Procedure:  CYSTOSCOPY, holmium laser litholapaxy of bladder stones, EXCHANGE OF SP TUBE;  Surgeon: Javy Jeffries MD;  Location: [facility];  Service: Urology    SUPRAPUBIC CATHETER INSERTION         Family History   Problem Relation Age of Onset    Heart attack Mother     Stroke Mother     Heart attack Father     Anuerysm Father         In Stomach      I have reviewed and agree with the history as documented.    E-Cigarette/Vaping    E-Cigarette Use Never User      E-Cigarette/Vaping Substances    Nicotine No     THC No     CBD No     Flavoring No     Other No     Unknown No      Social History     Tobacco Use    Smoking status: Former     Current packs/day: 0.00     Average packs/day: 0.5 packs/day for 31.0 years (15.5 ttl pk-yrs)     Types: Cigarettes     Start date:      Quit date:      Years since quittin.2    Smokeless tobacco: Never   Vaping Use    Vaping status: Never Used   Substance Use Topics    Alcohol use: Not Currently    Drug use: No       Review of Systems   Constitutional:  Negative for chills and fever.   Eyes:  Negative for pain and visual disturbance.   Respiratory:  Negative for cough and shortness of breath.    Cardiovascular:  Negative for chest pain, palpitations and leg swelling.   Gastrointestinal:  Positive for abdominal distention. Negative for abdominal pain and vomiting.   Genitourinary:  Positive for difficulty urinating. Negative for dysuria, flank pain, hematuria, penile discharge, penile pain, penile swelling and scrotal swelling.   Musculoskeletal:  Negative for arthralgias and back pain.   Skin:  Negative for color change, pallor, rash and wound.   Neurological:  Negative for seizures and syncope.   All other systems reviewed and are negative.      Physical Exam  Physical Exam  Vitals and nursing note reviewed.   Constitutional:       General: He is not in acute distress.     Appearance: He is well-developed. He is not ill-appearing.   HENT:      Head: Normocephalic and  atraumatic.   Eyes:      Conjunctiva/sclera: Conjunctivae normal.   Cardiovascular:      Rate and Rhythm: Normal rate and regular rhythm.      Heart sounds: No murmur heard.  Pulmonary:      Effort: Pulmonary effort is normal. No respiratory distress.      Breath sounds: Normal breath sounds.   Abdominal:      General: There is distension.      Palpations: Abdomen is soft.      Tenderness: There is abdominal tenderness (directly over insertion site).   Musculoskeletal:         General: No swelling.      Cervical back: Neck supple.   Skin:     General: Skin is warm and dry.      Capillary Refill: Capillary refill takes less than 2 seconds.      Findings: No erythema or rash.   Neurological:      General: No focal deficit present.      Mental Status: He is alert. Mental status is at baseline.   Psychiatric:         Mood and Affect: Mood normal.         Vital Signs  ED Triage Vitals   Temperature Pulse Respirations Blood Pressure SpO2   03/22/24 0904 03/22/24 0904 03/22/24 0904 03/22/24 0904 03/22/24 0904   98.5 °F (36.9 °C) 87 18 170/81 96 %      Temp Source Heart Rate Source Patient Position - Orthostatic VS BP Location FiO2 (%)   03/22/24 0904 03/22/24 0904 03/22/24 1127 03/22/24 1127 --   Oral Monitor Lying Right arm       Pain Score       03/22/24 0904       7           Vitals:    03/22/24 0904 03/22/24 1127   BP: 170/81 121/60   Pulse: 87 94   Patient Position - Orthostatic VS:  Lying         Visual Acuity      ED Medications  Medications   acetaminophen (TYLENOL) tablet 650 mg (650 mg Oral Given 3/22/24 1020)       Diagnostic Studies  Results Reviewed       Procedure Component Value Units Date/Time    Urine Microscopic [[id number]]  (Abnormal) Collected: 03/22/24 1022    Lab Status: Final result Specimen: Urine, Suprapubic catheter Updated: 03/22/24 1157     RBC, UA Innumerable /hpf      WBC, UA Innumerable /hpf      Epithelial Cells None Seen /hpf      Bacteria, UA Moderate /hpf      MUCUS THREADS Moderate      WBC Clumps Present     Budding Yeast Present    Urine culture [[id number]] Collected: 03/22/24 1022    Lab Status: In process Specimen: Urine, Suprapubic catheter Updated: 03/22/24 1157    Fingerstick Glucose (POCT) [[id number]]  (Abnormal) Collected: 03/22/24 1103    Lab Status: Final result Specimen: Blood Updated: 03/22/24 1105     POC Glucose 148 mg/dl     Urine Macroscopic, POC [[id number]]  (Abnormal) Collected: 03/22/24 1022    Lab Status: Final result Specimen: Urine Updated: 03/22/24 1024     Color, UA Yellow     Clarity, UA Cloudy     pH, UA 5.5     Leukocytes, UA Small     Nitrite, UA Positive     Protein, UA Negative mg/dl      Glucose, UA >=1000 (1%) mg/dl      Ketones, UA Negative mg/dl      Urobilinogen, UA 0.2 E.U./dl      Bilirubin, UA Negative     Occult Blood, UA Moderate     Specific Gravity, UA 1.010    Narrative:      CLINITEK RESULT                   No orders to display              Procedures  Procedures         ED Course  ED Course as of 03/22/24 1449   Fri Mar 22, 2024   1004 Urine output achieved after catheter flush                               SBIRT 22yo+      Flowsheet Row Most Recent Value   Initial Alcohol Screen: US AUDIT-C     1. How often do you have a drink containing alcohol? 0 Filed at: 03/22/2024 0908   2. How many drinks containing alcohol do you have on a typical day you are drinking?  0 Filed at: 03/22/2024 0908   3b. FEMALE Any Age, or MALE 65+: How often do you have 4 or more drinks on one occassion? 0 Filed at: 03/22/2024 0908   Audit-C Score 0 Filed at: 03/22/2024 0908   ALPESH: How many times in the past year have you...    Used an illegal drug or used a prescription medication for non-medical reasons? Never Filed at: 03/22/2024 0908                      Medical Decision Making  Patient presents with catheter dysfunction.  DDx includes but is not limited to obstruction, misplacement, UTI, cystitis.  Catheter was flushed and flow returned.  Greater than 2000 mL of fluid  were removed.  Urine significant for leukocytes and nitrites.  Patient appears to be colonized from previous UAs, given that patient has nitrates when he does not usually will treat this as a UTI.  Based on most recent CNS patient placed on Macrobid.  Benefit of treating UTI outweighs risk of Macrobid in elderly in this case.  Significant glucose present in the urine, consistent with previous.  Fingerstick glucose ordered to rule out significant hyperglycemia.  Sugar was very mildly elevated, patient is diabetic I recommended following up with primary care for this.  Discussed findings from the visit with the patient.  We had a conversation regarding supportive care and indications for return.  Recommended appropriate follow-up.  Patient and/or family understand and agree with plan.      Amount and/or Complexity of Data Reviewed  Labs: ordered.    Risk  OTC drugs.  Prescription drug management.             Disposition  Final diagnoses:   Suprapubic catheter dysfunction, initial encounter (HCC)   Urinary retention     Time reflects when diagnosis was documented in both MDM as applicable and the Disposition within this note       Time User Action Codes Description Comment    3/22/2024 11:07 AM Mary Pearson [T83.010A] Suprapubic catheter dysfunction, initial encounter ([facility])     3/22/2024 11:07 AM Mary Pearson [R33.9] Urinary retention           ED Disposition       ED Disposition   Discharge    Condition   Stable    Date/Time   Fri Mar 22, 2024 1107    Comment   Mathew Rico discharge to home/self care.                   Follow-up Information       Follow up With Specialties Details Why Contact Info Additional Information    [facility] Schedule an appointment as soon as possible for a visit   [address] [place] [address] [address]  [facility] [id number] [phone] [facility] [place] [id number], [facility]  [place], [address]   [phone]            Patient's Medications   Discharge Prescriptions    NITROFURANTOIN (MACROBID) 100 MG CAPSULE    Take 1 capsule (100 mg total) by mouth 2 (two) times a day       Start Date: 3/22/2024 End Date: --       Order Dose: 100 mg       Quantity: 10 capsule    Refills: 0       No discharge procedures on file.    PDMP Review         Value Time User    PDMP Reviewed  Yes 8/19/2023  1:22 AM SHA Downs            ED Provider  Electronically Signed by             Mary Pearson PA-C  03/22/24 7315

## 2024-03-22 NOTE — ED NOTES
Pt initially stated that he ambulates with assistance, states that with help he can ambulate up the 3 stairs leading to home, WC van booked. Pt unable to ambulate and is very stiff when attempting to move to seated position to stand and get into wheelchair. Pt unable to safely be transported via WC and will need to be re booked with stretcher for transport.      Anna Walter RN  03/22/24 3556

## 2024-03-23 ENCOUNTER — HOSPITAL ENCOUNTER (EMERGENCY)
Facility: HOSPITAL | Age: 74
Discharge: HOME | DRG: 698 | End: 2024-03-23
Attending: EMERGENCY MEDICINE
Payer: MEDICARE

## 2024-03-23 VITALS
HEART RATE: 105 BPM | HEIGHT: 67 IN | OXYGEN SATURATION: 98 % | WEIGHT: 146.83 LBS | DIASTOLIC BLOOD PRESSURE: 81 MMHG | RESPIRATION RATE: 18 BRPM | BODY MASS INDEX: 23.04 KG/M2 | TEMPERATURE: 97.8 F | SYSTOLIC BLOOD PRESSURE: 175 MMHG

## 2024-03-23 DIAGNOSIS — T83.010A SUPRAPUBIC CATHETER DYSFUNCTION, INITIAL ENCOUNTER (HCC): Primary | ICD-10-CM

## 2024-03-23 PROCEDURE — 51702 INSERT TEMP BLADDER CATH: CPT | Performed by: EMERGENCY MEDICINE

## 2024-03-23 PROCEDURE — 99283 EMERGENCY DEPT VISIT LOW MDM: CPT

## 2024-03-23 PROCEDURE — 99284 EMERGENCY DEPT VISIT MOD MDM: CPT | Performed by: EMERGENCY MEDICINE

## 2024-03-23 NOTE — ED PROVIDER NOTES
History  Chief Complaint   Patient presents with    Urinary Catheter Problem     Patient arrives via [facility] with reports of no output from suprapubic catheter since last night. Patient last had catheter changed by home health nurse Thursday.      Patient is a 73-year-old male who presents by ambulance for a clogged suprapubic catheter.  He believes it began yesterday.  He is complaining of some lower abdominal pain and distention.  No fever.  The catheter has been in place for a couple years.  He has a catheter secondary to neurogenic bladder.  He is otherwise without complaints.        Prior to Admission Medications   Prescriptions Last Dose Informant Patient Reported? Taking?   ALPRAZolam (XANAX) 0.25 mg tablet  Self Yes No   Sig: Take 0.25 mg by mouth 2 (two) times a day as needed for anxiety or sleep    Dulaglutide (Trulicity) 0.75 MG/0.5ML SOPN  Self Yes No   Sig: Inject 0.75 mg under the skin once a week   Ergocalciferol (VITAMIN D2 PO)  Self Yes No   Sig: Take 50,000 Units by mouth once a week   albuterol (2.5 mg/3 mL) 0.083 % nebulizer solution   No No   Sig: Take 3 mL (2.5 mg total) by nebulization every 6 (six) hours as needed for wheezing or shortness of breath   albuterol (Ventolin HFA) 90 mcg/act inhaler   No No   Sig: Inhale 2 puffs every 6 (six) hours as needed for wheezing   amLODIPine-atorvastatin (CADUET) 10-80 MG per tablet  Self Yes No   Sig: Take 1 tablet by mouth daily   bisacodyl (DULCOLAX) 10 mg suppository   No No   Sig: Insert 1 suppository (10 mg total) into the rectum daily as needed for constipation   clopidogrel (PLAVIX) 75 mg tablet  Self No No   Sig: Take 1 tablet (75 mg total) by mouth daily   gabapentin (NEURONTIN) 300 mg capsule  Self No No   Sig: Take 1 capsule (300 mg total) by mouth 2 (two) times a day   gabapentin (NEURONTIN) 300 mg capsule   No No   Sig: Take 2 capsules (600 mg total) by mouth daily at bedtime   latanoprost (XALATAN) 0.005 % ophthalmic solution  Self Yes No    Sig: Administer 1 drop to both eyes daily at bedtime   melatonin 3 mg  Self Yes No   Sig: Take 3 mg by mouth daily at bedtime as needed   nitrofurantoin (MACROBID) 100 mg capsule   No No   Sig: Take 1 capsule (100 mg total) by mouth 2 (two) times a day   pantoprazole (PROTONIX) 40 mg tablet   No No   Sig: TAKE 1 TABLET TWICE DAILY 30 MINUTES BEFORE BREAKFAST AND DINNER.   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)     Bladder infection     Bronchitis     Constipation     Cough     Diabetes mellitus ([facility])     Dizziness     Dysfunction of eustachian tube     Erectile dysfunction of non-organic origin     Fatigue     Glaucoma     Hiatal hernia     Hypertension     Imbalance     Leg muscle spasm     MS (multiple sclerosis) ([facility])     Nephrolithiasis     Neurogenic bladder     No natural teeth     Sinus pain     Spinal stenosis     Strain of thoracic region     Stroke ([facility])     Suprapubic catheter ([facility])        Past Surgical History:   Procedure Laterality Date    APPENDECTOMY      BRAIN SURGERY      Coil placed in aneurysm    CYSTOSCOPY      CYSTOSCOPY      CYSTOSCOPY  06/11/2018    CYSTOSCOPY  01/15/2021    EYE SURGERY      transscleral cyclophotocoagulation noncontact YAG laser    MYRINGOTOMY      with ventilation tube insertion    ID LITHOLAPAXY SMPL/SM <2.5 CM N/A 5/7/2019    Procedure: CYSTOSCOPY, holmium laser litholapaxy of bladder stones, EXCHANGE OF SP  TUBE;  Surgeon: Javy Jeffries MD;  Location: [facility];  Service: Urology    SUPRAPUBIC CATHETER INSERTION         Family History   Problem Relation Age of Onset    Heart attack Mother     Stroke Mother     Heart attack Father     Anuerysm Father         In Stomach      I have reviewed and agree with the history as documented.    E-Cigarette/Vaping    E-Cigarette Use Never User      E-Cigarette/Vaping Substances    Nicotine No     THC No     CBD No     Flavoring No     Other No     Unknown No      Social History     Tobacco Use    Smoking status: Former     Current packs/day: 0.00     Average packs/day: 0.5 packs/day for 31.0 years (15.5 ttl pk-yrs)     Types: Cigarettes     Start date:      Quit date:      Years since quittin.2    Smokeless tobacco: Never   Vaping Use    Vaping status: Never Used   Substance Use Topics    Alcohol use: Not Currently    Drug use: No       Review of Systems   Constitutional:  Negative for chills and fever.   HENT:  Negative for rhinorrhea and sore throat.    Eyes:  Negative for pain, redness and visual disturbance.   Respiratory:  Negative for cough and shortness of breath.    Cardiovascular:  Negative for chest pain and leg swelling.   Gastrointestinal:  Positive for abdominal pain. Negative for diarrhea and vomiting.   Endocrine: Negative for polydipsia and polyuria.   Genitourinary:  Positive for difficulty urinating. Negative for dysuria, frequency and hematuria.   Musculoskeletal:  Negative for back pain and neck pain.   Skin:  Negative for rash and wound.   Allergic/Immunologic: Negative for immunocompromised state.   Neurological:  Negative for weakness, numbness and headaches.   Psychiatric/Behavioral:  Negative for hallucinations and suicidal ideas.    All other systems reviewed and are negative.      Physical Exam  Physical Exam  Vitals reviewed.   Constitutional:       General: He is not in acute distress.     Appearance: He is not toxic-appearing.   HENT:       Head: Normocephalic and atraumatic.      Nose: Nose normal.      Mouth/Throat:      Mouth: Mucous membranes are moist.   Eyes:      General:         Right eye: No discharge.         Left eye: No discharge.      Conjunctiva/sclera: Conjunctivae normal.   Cardiovascular:      Rate and Rhythm: Regular rhythm. Tachycardia present.      Pulses: Normal pulses.      Heart sounds: Normal heart sounds. No murmur heard.     No friction rub. No gallop.   Pulmonary:      Effort: Pulmonary effort is normal. No respiratory distress.      Breath sounds: Normal breath sounds. No stridor. No wheezing, rhonchi or rales.   Abdominal:      General: Bowel sounds are normal. There is distension.      Palpations: Abdomen is soft.      Tenderness: There is abdominal tenderness. There is no right CVA tenderness, left CVA tenderness, guarding or rebound.      Comments: There is tenderness and distention to the suprapubic area.   Musculoskeletal:         General: No swelling, tenderness, deformity or signs of injury. Normal range of motion.      Cervical back: Normal range of motion and neck supple. No rigidity.      Right lower leg: No edema.      Left lower leg: No edema.      Comments: No calf pain or unilateral leg swelling   Skin:     General: Skin is warm and dry.      Coloration: Skin is not jaundiced or pale.      Findings: No bruising, erythema or rash.   Neurological:      General: No focal deficit present.      Mental Status: He is alert and oriented to person, place, and time.      Cranial Nerves: No facial asymmetry.      Sensory: No sensory deficit.      Motor: Motor function is intact.   Psychiatric:         Mood and Affect: Mood normal.         Behavior: Behavior normal.         Vital Signs  ED Triage Vitals [03/23/24 1008]   Temperature Pulse Respirations Blood Pressure SpO2   97.8 °F (36.6 °C) 105 20 170/82 99 %      Temp Source Heart Rate Source Patient Position - Orthostatic VS BP Location FiO2 (%)   Oral Monitor  Sitting Right arm --      Pain Score       5           Vitals:    03/23/24 1008   BP: 170/82   Pulse: 105   Patient Position - Orthostatic VS: Sitting         Visual Acuity      ED Medications  Medications - No data to display    Diagnostic Studies  Results Reviewed       None                   No orders to display              Procedures  Universal Protocol:  Consent: Verbal consent obtained.  Consent given by: patient  Patient identity confirmed: verbally with patient    Bladder catheterization    Date/Time: 3/23/2024 10:28 AM    Performed by: Kristopher Vu MD  Authorized by: Kristopher Vu MD    Patient location:  ED  Consent:     Consent given by:  Patient  Universal protocol:     Patient identity confirmed:  Verbally with patient  Procedure details:     Approach: percutaneous      Catheter type:  Cage    Catheter size:  18 Fr    Number of attempts:  1    Successful placement: yes      Urine characteristics:  Clear  Post-procedure details:     Patient tolerance of procedure:  Tolerated well, no immediate complications           ED Course                               SBIRT 22yo+      Flowsheet Row Most Recent Value   Initial Alcohol Screen: US AUDIT-C     1. How often do you have a drink containing alcohol? 0 Filed at: 03/23/2024 1010   2. How many drinks containing alcohol do you have on a typical day you are drinking?  0 Filed at: 03/23/2024 1010   3b. FEMALE Any Age, or MALE 65+: How often do you have 4 or more drinks on one occassion? 0 Filed at: 03/23/2024 1010   Audit-C Score 0 Filed at: 03/23/2024 1010   ALPESH: How many times in the past year have you...    Used an illegal drug or used a prescription medication for non-medical reasons? Never Filed at: 03/23/2024 1010                      Medical Decision Making  Symptoms resolved with suprapubic Cage catheter change.  Large amount of urine returned.  Now soft, nontender and nondistended.  Doubt infection as patient does not have fever or altered  mental status.  Considered but doubt ARMANDO.  Appropriate for discharge and outpatient management.    Risk  Decision regarding hospitalization.             Disposition  Final diagnoses:   Suprapubic catheter dysfunction, initial encounter (HCC)     Time reflects when diagnosis was documented in both MDM as applicable and the Disposition within this note       Time User Action Codes Description Comment    3/23/2024 10:28 AM Kristopher Vu Add [T83.010A] Suprapubic catheter dysfunction, initial encounter (HCC)           ED Disposition       ED Disposition   Discharge    Condition   Stable    Date/Time   Sat Mar 23, 2024 1028    Comment   Mathew Rico discharge to home/self care.                   Follow-up Information       Follow up With Specialties Details Why Contact Info Additional Information    [facility] In 1 week  [address] [place] [id number]  [phone] [facility] [address], [place], [place], [address]   [phone]            Patient's Medications   Discharge Prescriptions    No medications on file       No discharge procedures on file.    PDMP Review         Value Time User    PDMP Reviewed  Yes 8/19/2023  1:22 AM SHA Downs            ED Provider  Electronically Signed by             Kristopher Vu MD  03/23/24 2545

## 2024-03-24 LAB
BACTERIA UR CULT: ABNORMAL
BACTERIA UR CULT: ABNORMAL

## 2024-03-25 LAB
BACTERIA UR CULT: ABNORMAL

## 2024-03-26 ENCOUNTER — APPOINTMENT (EMERGENCY)
Dept: CT IMAGING | Facility: HOSPITAL | Age: 74
DRG: 698 | End: 2024-03-26
Payer: MEDICARE

## 2024-03-26 ENCOUNTER — APPOINTMENT (INPATIENT)
Dept: RADIOLOGY | Facility: HOSPITAL | Age: 74
DRG: 698 | End: 2024-03-26
Payer: MEDICARE

## 2024-03-26 ENCOUNTER — HOSPITAL ENCOUNTER (INPATIENT)
Facility: HOSPITAL | Age: 74
LOS: 3 days | Discharge: NURSING, REHAB OR LONG-TERM CARE | DRG: 698 | End: 2024-03-29
Attending: EMERGENCY MEDICINE | Admitting: INTERNAL MEDICINE
Payer: MEDICARE

## 2024-03-26 DIAGNOSIS — N30.90 CYSTITIS: ICD-10-CM

## 2024-03-26 DIAGNOSIS — Z93.59 CHRONIC SUPRAPUBIC CATHETER (HCC): Primary | ICD-10-CM

## 2024-03-26 LAB
ALBUMIN SERPL BCP-MCNC: 4.2 G/DL (ref 3.5–5)
ALP SERPL-CCNC: 92 U/L (ref 34–104)
ALT SERPL W P-5'-P-CCNC: 14 U/L (ref 7–52)
ANION GAP SERPL CALCULATED.3IONS-SCNC: 11 MMOL/L (ref 4–13)
AST SERPL W P-5'-P-CCNC: 15 U/L (ref 13–39)
BACTERIA UR QL AUTO: ABNORMAL /HPF
BASOPHILS # BLD AUTO: 0.11 THOUSANDS/ÂΜL (ref 0–0.1)
BASOPHILS NFR BLD AUTO: 1 % (ref 0–1)
BILIRUB SERPL-MCNC: 0.59 MG/DL (ref 0.2–1)
BILIRUB UR QL STRIP: NEGATIVE
BUN SERPL-MCNC: 12 MG/DL (ref 5–25)
CALCIUM SERPL-MCNC: 10.7 MG/DL (ref 8.4–10.2)
CHLORIDE SERPL-SCNC: 98 MMOL/L (ref 96–108)
CLARITY UR: CLEAR
CO2 SERPL-SCNC: 26 MMOL/L (ref 21–32)
COLOR UR: ABNORMAL
CREAT SERPL-MCNC: 0.77 MG/DL (ref 0.6–1.3)
EOSINOPHIL # BLD AUTO: 0.38 THOUSAND/ÂΜL (ref 0–0.61)
EOSINOPHIL NFR BLD AUTO: 3 % (ref 0–6)
ERYTHROCYTE [DISTWIDTH] IN BLOOD BY AUTOMATED COUNT: 14 % (ref 11.6–15.1)
EST. AVERAGE GLUCOSE BLD GHB EST-MCNC: 137 MG/DL
GFR SERPL CREATININE-BSD FRML MDRD: 89 ML/MIN/1.73SQ M
GLUCOSE SERPL-MCNC: 103 MG/DL (ref 65–140)
GLUCOSE SERPL-MCNC: 129 MG/DL (ref 65–140)
GLUCOSE SERPL-MCNC: 183 MG/DL (ref 65–140)
GLUCOSE UR STRIP-MCNC: ABNORMAL MG/DL
HBA1C MFR BLD: 6.4 %
HCT VFR BLD AUTO: 46 % (ref 36.5–49.3)
HGB BLD-MCNC: 15.2 G/DL (ref 12–17)
HGB UR QL STRIP.AUTO: NEGATIVE
IMM GRANULOCYTES # BLD AUTO: 0.04 THOUSAND/UL (ref 0–0.2)
IMM GRANULOCYTES NFR BLD AUTO: 0 % (ref 0–2)
KETONES UR STRIP-MCNC: ABNORMAL MG/DL
LACTATE SERPL-SCNC: 1.3 MMOL/L (ref 0.5–2)
LACTATE SERPL-SCNC: 2.2 MMOL/L (ref 0.5–2)
LEUKOCYTE ESTERASE UR QL STRIP: ABNORMAL
LYMPHOCYTES # BLD AUTO: 2.28 THOUSANDS/ÂΜL (ref 0.6–4.47)
LYMPHOCYTES NFR BLD AUTO: 16 % (ref 14–44)
MCH RBC QN AUTO: 29.5 PG (ref 26.8–34.3)
MCHC RBC AUTO-ENTMCNC: 33 G/DL (ref 31.4–37.4)
MCV RBC AUTO: 89 FL (ref 82–98)
MONOCYTES # BLD AUTO: 0.72 THOUSAND/ÂΜL (ref 0.17–1.22)
MONOCYTES NFR BLD AUTO: 5 % (ref 4–12)
NEUTROPHILS # BLD AUTO: 10.59 THOUSANDS/ÂΜL (ref 1.85–7.62)
NEUTS SEG NFR BLD AUTO: 75 % (ref 43–75)
NITRITE UR QL STRIP: NEGATIVE
NON-SQ EPI CELLS URNS QL MICRO: ABNORMAL /HPF
NRBC BLD AUTO-RTO: 0 /100 WBCS
PH UR STRIP.AUTO: 7.5 [PH]
PLATELET # BLD AUTO: 468 THOUSANDS/UL (ref 149–390)
PLATELET # BLD AUTO: 564 THOUSANDS/UL (ref 149–390)
PMV BLD AUTO: 7.9 FL (ref 8.9–12.7)
PMV BLD AUTO: 8.5 FL (ref 8.9–12.7)
POTASSIUM SERPL-SCNC: 3.9 MMOL/L (ref 3.5–5.3)
PROCALCITONIN SERPL-MCNC: 0.07 NG/ML
PROT SERPL-MCNC: 8.6 G/DL (ref 6.4–8.4)
PROT UR STRIP-MCNC: NEGATIVE MG/DL
RBC # BLD AUTO: 5.16 MILLION/UL (ref 3.88–5.62)
RBC #/AREA URNS AUTO: ABNORMAL /HPF
SODIUM SERPL-SCNC: 135 MMOL/L (ref 135–147)
SP GR UR STRIP.AUTO: 1.02 (ref 1–1.03)
UROBILINOGEN UR STRIP-ACNC: <2 MG/DL
WBC # BLD AUTO: 14.12 THOUSAND/UL (ref 4.31–10.16)
WBC #/AREA URNS AUTO: ABNORMAL /HPF

## 2024-03-26 PROCEDURE — 87086 URINE CULTURE/COLONY COUNT: CPT | Performed by: EMERGENCY MEDICINE

## 2024-03-26 PROCEDURE — 81001 URINALYSIS AUTO W/SCOPE: CPT | Performed by: EMERGENCY MEDICINE

## 2024-03-26 PROCEDURE — 96374 THER/PROPH/DIAG INJ IV PUSH: CPT

## 2024-03-26 PROCEDURE — 87040 BLOOD CULTURE FOR BACTERIA: CPT | Performed by: EMERGENCY MEDICINE

## 2024-03-26 PROCEDURE — 96375 TX/PRO/DX INJ NEW DRUG ADDON: CPT

## 2024-03-26 PROCEDURE — 99285 EMERGENCY DEPT VISIT HI MDM: CPT

## 2024-03-26 PROCEDURE — 80053 COMPREHEN METABOLIC PANEL: CPT | Performed by: EMERGENCY MEDICINE

## 2024-03-26 PROCEDURE — 74177 CT ABD & PELVIS W/CONTRAST: CPT

## 2024-03-26 PROCEDURE — 85049 AUTOMATED PLATELET COUNT: CPT | Performed by: INTERNAL MEDICINE

## 2024-03-26 PROCEDURE — 36415 COLL VENOUS BLD VENIPUNCTURE: CPT | Performed by: EMERGENCY MEDICINE

## 2024-03-26 PROCEDURE — 84145 PROCALCITONIN (PCT): CPT | Performed by: EMERGENCY MEDICINE

## 2024-03-26 PROCEDURE — 85025 COMPLETE CBC W/AUTO DIFF WBC: CPT | Performed by: EMERGENCY MEDICINE

## 2024-03-26 PROCEDURE — 83605 ASSAY OF LACTIC ACID: CPT | Performed by: EMERGENCY MEDICINE

## 2024-03-26 PROCEDURE — 87106 FUNGI IDENTIFICATION YEAST: CPT | Performed by: EMERGENCY MEDICINE

## 2024-03-26 PROCEDURE — 71045 X-RAY EXAM CHEST 1 VIEW: CPT

## 2024-03-26 PROCEDURE — 83036 HEMOGLOBIN GLYCOSYLATED A1C: CPT | Performed by: INTERNAL MEDICINE

## 2024-03-26 PROCEDURE — 99285 EMERGENCY DEPT VISIT HI MDM: CPT | Performed by: EMERGENCY MEDICINE

## 2024-03-26 PROCEDURE — 99223 1ST HOSP IP/OBS HIGH 75: CPT | Performed by: INTERNAL MEDICINE

## 2024-03-26 PROCEDURE — 96361 HYDRATE IV INFUSION ADD-ON: CPT

## 2024-03-26 PROCEDURE — 82948 REAGENT STRIP/BLOOD GLUCOSE: CPT

## 2024-03-26 RX ORDER — GABAPENTIN 300 MG/1
600 CAPSULE ORAL
Status: DISCONTINUED | OUTPATIENT
Start: 2024-03-26 | End: 2024-03-29 | Stop reason: HOSPADM

## 2024-03-26 RX ORDER — ONDANSETRON 2 MG/ML
4 INJECTION INTRAMUSCULAR; INTRAVENOUS EVERY 6 HOURS PRN
Status: DISCONTINUED | OUTPATIENT
Start: 2024-03-26 | End: 2024-03-29 | Stop reason: HOSPADM

## 2024-03-26 RX ORDER — LANOLIN ALCOHOL/MO/W.PET/CERES
3 CREAM (GRAM) TOPICAL
Status: DISCONTINUED | OUTPATIENT
Start: 2024-03-26 | End: 2024-03-29 | Stop reason: HOSPADM

## 2024-03-26 RX ORDER — MAGNESIUM HYDROXIDE/ALUMINUM HYDROXICE/SIMETHICONE 120; 1200; 1200 MG/30ML; MG/30ML; MG/30ML
30 SUSPENSION ORAL EVERY 6 HOURS PRN
Status: DISCONTINUED | OUTPATIENT
Start: 2024-03-26 | End: 2024-03-29 | Stop reason: HOSPADM

## 2024-03-26 RX ORDER — ECHINACEA PURPUREA EXTRACT 125 MG
1 TABLET ORAL
Status: DISCONTINUED | OUTPATIENT
Start: 2024-03-26 | End: 2024-03-29 | Stop reason: HOSPADM

## 2024-03-26 RX ORDER — AMOXICILLIN 250 MG
2 CAPSULE ORAL
Status: DISCONTINUED | OUTPATIENT
Start: 2024-03-26 | End: 2024-03-29 | Stop reason: HOSPADM

## 2024-03-26 RX ORDER — GABAPENTIN 300 MG/1
300 CAPSULE ORAL 2 TIMES DAILY
Status: DISCONTINUED | OUTPATIENT
Start: 2024-03-26 | End: 2024-03-29 | Stop reason: HOSPADM

## 2024-03-26 RX ORDER — POLYETHYLENE GLYCOL 3350 17 G/17G
17 POWDER, FOR SOLUTION ORAL DAILY
Status: DISCONTINUED | OUTPATIENT
Start: 2024-03-26 | End: 2024-03-29 | Stop reason: HOSPADM

## 2024-03-26 RX ORDER — PANTOPRAZOLE SODIUM 40 MG/1
40 TABLET, DELAYED RELEASE ORAL
Status: DISCONTINUED | OUTPATIENT
Start: 2024-03-26 | End: 2024-03-29 | Stop reason: HOSPADM

## 2024-03-26 RX ORDER — AMLODIPINE BESYLATE 10 MG/1
10 TABLET ORAL DAILY
Status: DISCONTINUED | OUTPATIENT
Start: 2024-03-26 | End: 2024-03-29 | Stop reason: HOSPADM

## 2024-03-26 RX ORDER — PROPRANOLOL HCL 60 MG
60 CAPSULE, EXTENDED RELEASE 24HR ORAL DAILY
Status: DISCONTINUED | OUTPATIENT
Start: 2024-03-26 | End: 2024-03-29 | Stop reason: HOSPADM

## 2024-03-26 RX ORDER — SERTRALINE HYDROCHLORIDE 25 MG/1
25 TABLET, FILM COATED ORAL
Status: DISCONTINUED | OUTPATIENT
Start: 2024-03-26 | End: 2024-03-29 | Stop reason: HOSPADM

## 2024-03-26 RX ORDER — BISACODYL 10 MG
10 SUPPOSITORY, RECTAL RECTAL DAILY PRN
Status: DISCONTINUED | OUTPATIENT
Start: 2024-03-26 | End: 2024-03-29 | Stop reason: HOSPADM

## 2024-03-26 RX ORDER — ACETAMINOPHEN 325 MG/1
650 TABLET ORAL EVERY 6 HOURS PRN
Status: DISCONTINUED | OUTPATIENT
Start: 2024-03-26 | End: 2024-03-29 | Stop reason: HOSPADM

## 2024-03-26 RX ORDER — INSULIN LISPRO 100 [IU]/ML
1-5 INJECTION, SOLUTION INTRAVENOUS; SUBCUTANEOUS
Status: DISCONTINUED | OUTPATIENT
Start: 2024-03-26 | End: 2024-03-29 | Stop reason: HOSPADM

## 2024-03-26 RX ORDER — LATANOPROST 50 UG/ML
1 SOLUTION/ DROPS OPHTHALMIC
Status: DISCONTINUED | OUTPATIENT
Start: 2024-03-26 | End: 2024-03-29 | Stop reason: HOSPADM

## 2024-03-26 RX ORDER — CEFTRIAXONE 1 G/50ML
1000 INJECTION, SOLUTION INTRAVENOUS EVERY 24 HOURS
Status: DISCONTINUED | OUTPATIENT
Start: 2024-03-26 | End: 2024-03-29 | Stop reason: HOSPADM

## 2024-03-26 RX ORDER — CLOPIDOGREL BISULFATE 75 MG/1
75 TABLET ORAL DAILY
Status: DISCONTINUED | OUTPATIENT
Start: 2024-03-26 | End: 2024-03-29 | Stop reason: HOSPADM

## 2024-03-26 RX ORDER — HEPARIN SODIUM 5000 [USP'U]/ML
5000 INJECTION, SOLUTION INTRAVENOUS; SUBCUTANEOUS EVERY 8 HOURS SCHEDULED
Status: DISCONTINUED | OUTPATIENT
Start: 2024-03-26 | End: 2024-03-29 | Stop reason: HOSPADM

## 2024-03-26 RX ORDER — FENTANYL CITRATE 50 UG/ML
50 INJECTION, SOLUTION INTRAMUSCULAR; INTRAVENOUS ONCE
Status: COMPLETED | OUTPATIENT
Start: 2024-03-26 | End: 2024-03-26

## 2024-03-26 RX ORDER — ALBUTEROL SULFATE 2.5 MG/3ML
2.5 SOLUTION RESPIRATORY (INHALATION) EVERY 6 HOURS PRN
Status: DISCONTINUED | OUTPATIENT
Start: 2024-03-26 | End: 2024-03-29 | Stop reason: HOSPADM

## 2024-03-26 RX ORDER — ALPRAZOLAM 0.25 MG/1
0.25 TABLET ORAL 2 TIMES DAILY PRN
Status: DISCONTINUED | OUTPATIENT
Start: 2024-03-26 | End: 2024-03-29 | Stop reason: HOSPADM

## 2024-03-26 RX ORDER — ATORVASTATIN CALCIUM 80 MG/1
80 TABLET, FILM COATED ORAL DAILY
Status: DISCONTINUED | OUTPATIENT
Start: 2024-03-26 | End: 2024-03-29 | Stop reason: HOSPADM

## 2024-03-26 RX ADMIN — SERTRALINE HYDROCHLORIDE 25 MG: 25 TABLET ORAL at 21:15

## 2024-03-26 RX ADMIN — IOHEXOL 100 ML: 350 INJECTION, SOLUTION INTRAVENOUS at 09:05

## 2024-03-26 RX ADMIN — HEPARIN SODIUM 5000 UNITS: 5000 INJECTION INTRAVENOUS; SUBCUTANEOUS at 21:14

## 2024-03-26 RX ADMIN — ONDANSETRON 4 MG: 2 INJECTION INTRAMUSCULAR; INTRAVENOUS at 17:26

## 2024-03-26 RX ADMIN — SODIUM CHLORIDE 3 G: 9 INJECTION, SOLUTION INTRAVENOUS at 10:22

## 2024-03-26 RX ADMIN — SALINE NASAL SPRAY 1 SPRAY: 1.5 SOLUTION NASAL at 21:14

## 2024-03-26 RX ADMIN — HEPARIN SODIUM 5000 UNITS: 5000 INJECTION INTRAVENOUS; SUBCUTANEOUS at 14:32

## 2024-03-26 RX ADMIN — CEFTRIAXONE 1000 MG: 1 INJECTION, SOLUTION INTRAVENOUS at 14:32

## 2024-03-26 RX ADMIN — GABAPENTIN 600 MG: 300 CAPSULE ORAL at 21:15

## 2024-03-26 RX ADMIN — PANTOPRAZOLE SODIUM 40 MG: 40 TABLET, DELAYED RELEASE ORAL at 17:16

## 2024-03-26 RX ADMIN — GABAPENTIN 300 MG: 300 CAPSULE ORAL at 17:14

## 2024-03-26 RX ADMIN — LATANOPROST 1 DROP: 50 SOLUTION OPHTHALMIC at 21:14

## 2024-03-26 RX ADMIN — INSULIN LISPRO 1 UNITS: 100 INJECTION, SOLUTION INTRAVENOUS; SUBCUTANEOUS at 17:14

## 2024-03-26 RX ADMIN — SODIUM CHLORIDE 1000 ML: 0.9 INJECTION, SOLUTION INTRAVENOUS at 10:27

## 2024-03-26 RX ADMIN — SODIUM CHLORIDE 1000 ML: 0.9 INJECTION, SOLUTION INTRAVENOUS at 08:26

## 2024-03-26 RX ADMIN — FENTANYL CITRATE 50 MCG: 50 INJECTION INTRAMUSCULAR; INTRAVENOUS at 08:26

## 2024-03-26 NOTE — SEPSIS NOTE
"  Sepsis Note   Mathew Rico 73 y.o. male MRN: [id number]  Unit/Bed#: ED-11 Encounter: [id number]       Initial Sepsis Screening       Row Name 03/26/24 1055                Is the patient's history suggestive of a new or worsening infection? Yes (Proceed)  -SG        Suspected source of infection urinary tract infection  -SG        Indicate SIRS criteria Leukocytosis (WBC > 42878 IJL) OR Leukopenia (WBC <4000 IJL) OR Bandemia (WBC >10% bands);Tachycardia > 90 bpm  -SG        Are two or more of the above signs & symptoms of infection both present and new to the patient? Yes (Proceed)  -SG        Assess for evidence of organ dysfunction: Are any of the below criteria present within 6 hours of suspected infection and SIRS criteria that are NOT considered to be chronic conditions? Lactate > 2.0  -SG        Date of presentation of severe sepsis --        Time of presentation of severe sepsis --        Sepsis Note: Click \"NEXT\" below (NOT \"close\") to generate sepsis note based on above information. YES (proceed by clicking \"NEXT\")  -SG                  User Key  (r) = Recorded By, (t) = Taken By, (c) = Cosigned By      Initials Name Provider Type    SG Leatha Bobby DO Physician                        Body mass index is 22.1 kg/m².  Wt Readings from Last 1 Encounters:   03/26/24 64 kg (141 lb 1.5 oz)     IBW (Ideal Body Weight): 66.1 kg    Ideal body weight: 66.1 kg (145 lb 11.6 oz)    "

## 2024-03-26 NOTE — ASSESSMENT & PLAN NOTE
Wt Readings from Last 3 Encounters:   03/26/24 64 kg (141 lb 1.5 oz)   03/23/24 66.6 kg (146 lb 13.2 oz)   02/29/24 66.2 kg (145 lb 15.1 oz)   Euvolemic by physical examination  Continue Plavix, continue Inderal continue amlodipine

## 2024-03-26 NOTE — ASSESSMENT & PLAN NOTE
Patient presenting with sepsis of a urinary etiology as evidenced by leukocytosis of 14,000 as well as tachycardia  Received sepsis bolus in the emergency room  Catheter associated urinary tract infection present on admission  Continue ceftriaxone day #1  Cultures and urine sensitivity  Urology consultation placed for suprapubic tube change  Given history of heart failure did not give full 30 cc/kg fluid bolus

## 2024-03-26 NOTE — ASSESSMENT & PLAN NOTE
"Lab Results   Component Value Date    HGBA1C 8.1 (H) 12/04/2023       No results for input(s): \"POCGLU\" in the last 72 hours.    Blood Sugar Average: Last 72 hrs:    Home regimen reviewed. Hold Metformin if applicable.  Start SSI and Basal bolus protocol  "

## 2024-03-26 NOTE — ED PROVIDER NOTES
Pt Name: Mathew Rico  MRN: [id number]  Birthdate 1950  Age/Sex: 73 y.o. male  Date of evaluation: 3/26/2024  PCP: Steve Dickerson DO    CHIEF COMPLAINT    Chief Complaint   Patient presents with    Groin Pain     Pt c/o of groin pain since this morning. Pt has suprapubic dela cruz. Also c/o of dizziness and sweats.         HPI    Mathew presents to the Emergency Department complaining of abdominal pain and groin pain.  He has been feeling dizzy and feverish as well.  There have been recurrent issues with his suprapubic catheter and was due to see urology today to have this addressed.       HPI      Past Medical and Surgical History    Past Medical History:   Diagnosis Date    Acute laryngitis     Acute nonsuppurative otitis media, unspecified laterality     Arm weakness     Arthritis     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HCC)     Bladder infection     Bronchitis     Constipation     Cough     Diabetes mellitus (HCC)     Dizziness     Dysfunction of eustachian tube     Erectile dysfunction of non-organic origin     Fatigue     Glaucoma     Hiatal hernia     Hypertension     Imbalance     Leg muscle spasm     MS (multiple sclerosis) (HCC)     Nephrolithiasis     Neurogenic bladder     No natural teeth     Sinus pain     Spinal stenosis     Strain of thoracic region     Stroke (HCC)     Suprapubic catheter (HCC)        Past Surgical History:   Procedure Laterality Date    APPENDECTOMY      BRAIN SURGERY      Coil placed in aneurysm    CYSTOSCOPY      CYSTOSCOPY      CYSTOSCOPY  06/11/2018    CYSTOSCOPY  01/15/2021    EYE SURGERY      transscleral cyclophotocoagulation noncontact YAG laser    MYRINGOTOMY      with ventilation tube insertion    DC LITHOLAPAXY SMPL/SM <2.5 CM N/A 5/7/2019    Procedure: CYSTOSCOPY, holmium laser litholapaxy of bladder stones, EXCHANGE OF SP TUBE;  Surgeon: Javy Jeffries MD;  Location: [facility];  Service: Urology    SUPRAPUBIC CATHETER INSERTION         Family History   Problem Relation  Age of Onset    Heart attack Mother     Stroke Mother     Heart attack Father     Anuerysm Father         In Stomach        Social History     Tobacco Use    Smoking status: Former     Current packs/day: 0.00     Average packs/day: 0.5 packs/day for 31.0 years (15.5 ttl pk-yrs)     Types: Cigarettes     Start date:      Quit date:      Years since quittin.2    Smokeless tobacco: Never   Vaping Use    Vaping status: Never Used   Substance Use Topics    Alcohol use: Not Currently    Drug use: No         .    Allergies    Allergies   Allergen Reactions    Cephalexin Rash       Home Medications    Prior to Admission medications    Medication Sig Start Date End Date Taking? Authorizing Provider   albuterol (2.5 mg/3 mL) 0.083 % nebulizer solution Take 3 mL (2.5 mg total) by nebulization every 6 (six) hours as needed for wheezing or shortness of breath 10/3/23  Yes Nelson Cabezas MD   albuterol (Ventolin HFA) 90 mcg/act inhaler Inhale 2 puffs every 6 (six) hours as needed for wheezing 10/3/23  Yes Nelson Cabezas MD   ALPRAZolam (XANAX) 0.25 mg tablet Take 0.25 mg by mouth 2 (two) times a day as needed for anxiety or sleep    Yes Historical Provider, MD   amLODIPine-atorvastatin (CADUET) 10-80 MG per tablet Take 1 tablet by mouth daily   Yes Historical Provider, MD   bisacodyl (DULCOLAX) 10 mg suppository Insert 1 suppository (10 mg total) into the rectum daily as needed for constipation 24  Yes Mahad Perea MD   Dulaglutide (Trulicity) 0.75 MG/0.5ML SOPN Inject 0.75 mg under the skin once a week   Yes Historical Provider, MD   Ergocalciferol (VITAMIN D2 PO) Take 50,000 Units by mouth once a week   Yes Historical Provider, MD   gabapentin (NEURONTIN) 300 mg capsule Take 1 capsule (300 mg total) by mouth 2 (two) times a day 6/3/21  Yes Phan Vazquez,    latanoprost (XALATAN) 0.005 % ophthalmic solution Administer 1 drop to both eyes daily at bedtime   Yes Historical Provider, MD    melatonin 3 mg Take 3 mg by mouth daily at bedtime as needed   Yes Historical Provider, MD   pantoprazole (PROTONIX) 40 mg tablet TAKE 1 TABLET TWICE DAILY 30 MINUTES BEFORE BREAKFAST AND DINNER. 3/13/18  Yes Antoni Parrish,    polyethylene glycol (MIRALAX) 17 g packet Take 17 g by mouth daily 2/21/24  Yes Mahad Perea MD   propranolol (INDERAL LA) 60 mg 24 hr capsule Take 60 mg by mouth daily   Yes Historical Provider, MD   senna-docusate sodium (SENOKOT S) 8.6-50 mg per tablet Take 2 tablets by mouth daily at bedtime   Yes Historical Provider, MD   clopidogrel (PLAVIX) 75 mg tablet Take 1 tablet (75 mg total) by mouth daily 10/27/18   Kraig Griffin MD   gabapentin (NEURONTIN) 300 mg capsule Take 2 capsules (600 mg total) by mouth daily at bedtime 6/3/21   Phan Vazquez,    nitrofurantoin (MACROBID) 100 mg capsule Take 1 capsule (100 mg total) by mouth 2 (two) times a day 3/22/24   Mary Pearson PA-C   sertraline (ZOLOFT) 25 mg tablet Take 25 mg by mouth daily at bedtime    Historical Provider, MD           Review of Systems    Review of Systems   Constitutional:  Positive for activity change, appetite change, chills, diaphoresis, fatigue and fever.   HENT:  Negative for ear pain and sore throat.    Eyes:  Negative for pain and visual disturbance.   Respiratory:  Negative for cough and shortness of breath.    Cardiovascular:  Negative for chest pain and palpitations.   Gastrointestinal:  Positive for abdominal pain. Negative for vomiting.   Genitourinary:  Positive for decreased urine volume. Negative for dysuria and hematuria.   Musculoskeletal:  Negative for arthralgias and back pain.   Skin:  Negative for color change and rash.   Neurological:  Negative for seizures and syncope.   All other systems reviewed and are negative.          All other systems reviewed and negative.    Physical Exam      ED Triage Vitals [03/26/24 0728]   Temperature Pulse Respirations Blood Pressure SpO2   97.9  °F (36.6 °C) (!) 112 17 162/72 98 %      Temp Source Heart Rate Source Patient Position - Orthostatic VS BP Location FiO2 (%)   Oral Monitor Lying Right arm --      Pain Score       5               Physical Exam  Vitals and nursing note reviewed.   Constitutional:       General: He is not in acute distress.     Appearance: He is well-developed. He is not diaphoretic.   HENT:      Head: Normocephalic and atraumatic.      Nose: Nose normal.   Eyes:      Conjunctiva/sclera: Conjunctivae normal.      Pupils: Pupils are equal, round, and reactive to light.   Cardiovascular:      Rate and Rhythm: Normal rate and regular rhythm.      Heart sounds: Normal heart sounds. No murmur heard.     No friction rub. No gallop.   Pulmonary:      Effort: Pulmonary effort is normal. No respiratory distress.      Breath sounds: Normal breath sounds. No wheezing or rales.   Abdominal:      General: Bowel sounds are normal.      Palpations: Abdomen is soft.      Tenderness: There is no abdominal tenderness. There is no guarding or rebound.       Musculoskeletal:         General: Normal range of motion.      Cervical back: Normal range of motion and neck supple.   Skin:     General: Skin is warm and dry.   Neurological:      Mental Status: He is alert and oriented to person, place, and time.   Psychiatric:         Behavior: Behavior normal.                Assessment and Plan    Mathew Rico is a 73 y.o. male who presents with abdominal pain. Physical examination remarkable for same. Differential diagnosis (not completely inclusive) includes infection. Plan will be to perform diagnostic testing and treat symptomatically.      MDM  Number of Diagnoses or Management Options  Cystitis  Diagnosis management comments: I spoke with the patient's PCP regarding concern for need for larger SPT.  Urology will be consulted this admission.   I spoke with Dr. Talley who saw patient in the ED and admit.           Diagnostic  Results        Labs:    Results for orders placed or performed during the hospital encounter of 03/26/24   Blood culture #1    Specimen: Arm, Right; Blood   Result Value Ref Range    Blood Culture Received in Microbiology Lab. Culture in Progress.    Blood culture #2    Specimen: Arm, Right; Blood   Result Value Ref Range    Blood Culture Received in Microbiology Lab. Culture in Progress.    CBC and differential   Result Value Ref Range    WBC 14.12 (H) 4.31 - 10.16 Thousand/uL    RBC 5.16 3.88 - 5.62 Million/uL    Hemoglobin 15.2 12.0 - 17.0 g/dL    Hematocrit 46.0 36.5 - 49.3 %    MCV 89 82 - 98 fL    MCH 29.5 26.8 - 34.3 pg    MCHC 33.0 31.4 - 37.4 g/dL    RDW 14.0 11.6 - 15.1 %    MPV 8.5 (L) 8.9 - 12.7 fL    Platelets 564 (H) 149 - 390 Thousands/uL    nRBC 0 /100 WBCs    Neutrophils Relative 75 43 - 75 %    Immature Grans % 0 0 - 2 %    Lymphocytes Relative 16 14 - 44 %    Monocytes Relative 5 4 - 12 %    Eosinophils Relative 3 0 - 6 %    Basophils Relative 1 0 - 1 %    Neutrophils Absolute 10.59 (H) 1.85 - 7.62 Thousands/µL    Absolute Immature Grans 0.04 0.00 - 0.20 Thousand/uL    Absolute Lymphocytes 2.28 0.60 - 4.47 Thousands/µL    Absolute Monocytes 0.72 0.17 - 1.22 Thousand/µL    Eosinophils Absolute 0.38 0.00 - 0.61 Thousand/µL    Basophils Absolute 0.11 (H) 0.00 - 0.10 Thousands/µL   Comprehensive metabolic panel   Result Value Ref Range    Sodium 135 135 - 147 mmol/L    Potassium 3.9 3.5 - 5.3 mmol/L    Chloride 98 96 - 108 mmol/L    CO2 26 21 - 32 mmol/L    ANION GAP 11 4 - 13 mmol/L    BUN 12 5 - 25 mg/dL    Creatinine 0.77 0.60 - 1.30 mg/dL    Glucose 129 65 - 140 mg/dL    Calcium 10.7 (H) 8.4 - 10.2 mg/dL    AST 15 13 - 39 U/L    ALT 14 7 - 52 U/L    Alkaline Phosphatase 92 34 - 104 U/L    Total Protein 8.6 (H) 6.4 - 8.4 g/dL    Albumin 4.2 3.5 - 5.0 g/dL    Total Bilirubin 0.59 0.20 - 1.00 mg/dL    eGFR 89 ml/min/1.73sq m   UA w Reflex to Microscopic w Reflex to Culture    Specimen: Urine,  Suprapubic catheter   Result Value Ref Range    Color, UA Light Yellow     Clarity, UA Clear     Specific Gravity, UA 1.018 1.003 - 1.030    pH, UA 7.5 4.5, 5.0, 5.5, 6.0, 6.5, 7.0, 7.5, 8.0    Leukocytes, UA Moderate (A) Negative    Nitrite, UA Negative Negative    Protein, UA Negative Negative mg/dl    Glucose, UA >=1000 (1%) (A) Negative mg/dl    Ketones, UA 20 (1+) (A) Negative mg/dl    Urobilinogen, UA <2.0 <2.0 mg/dl mg/dl    Bilirubin, UA Negative Negative    Occult Blood, UA Negative Negative   Lactic acid, plasma (w/reflex if result > 2.0)   Result Value Ref Range    LACTIC ACID 2.2 (HH) 0.5 - 2.0 mmol/L   Lactic acid 2 Hours   Result Value Ref Range    LACTIC ACID 1.3 0.5 - 2.0 mmol/L   Procalcitonin   Result Value Ref Range    Procalcitonin 0.07 <=0.25 ng/ml   Urine Microscopic   Result Value Ref Range    RBC, UA 4-10 (A) None Seen, 1-2 /hpf    WBC, UA Innumerable (A) None Seen, 1-2 /hpf    Epithelial Cells None Seen None Seen, Occasional /hpf    Bacteria, UA None Seen None Seen, Occasional /hpf   Platelet count   Result Value Ref Range    Platelets 468 (H) 149 - 390 Thousands/uL    MPV 7.9 (L) 8.9 - 12.7 fL   Hemoglobin A1c w/EAG Estimation (Orders if not completed within the last 90 days)   Result Value Ref Range    Hemoglobin A1C 6.4 (H) Normal 4.0-5.6%; PreDiabetic 5.7-6.4%; Diabetic >=6.5%; Glycemic control for adults with diabetes <7.0% %     mg/dl   Fingerstick Glucose (POCT)   Result Value Ref Range    POC Glucose 183 (H) 65 - 140 mg/dl       All labs reviewed and utilized in the medical decision making process    Radiology:    XR chest 1 view portable   Final Result      Small area of tree-in-bud pattern of opacity in the right lateral midlung field which could be minimal infection/bronchiolitis.            Workstation performed: [id number]         CT abdomen pelvis w contrast   Final Result      Cystitis despite the presence of suprapubic catheter with perivesical stranding slightly more  "pronounced when compared with February 15, 2024.      Proctitis with perirectal fat stranding, also similar from February 15, 2024.      Small patchy consolidation in the anterolateral left lower lobe suspicious for small area of focal pneumonia. Low radiation dose noncontrast chest CT follow-up in 3 to 6 months recommended to document interval resolution.      This examination was marked \"immediate notification\" in Epic in order to begin the standard process by which the radiology reading room liaison alerts the referring practitioner.            Workstation performed: [id number]             All radiology studies independently viewed by me and interpreted by the radiologist.    Procedure    Procedures      ED Course of Care and Re-Assessments      Medications   albuterol inhalation solution 2.5 mg (has no administration in time range)   ALPRAZolam (XANAX) tablet 0.25 mg (has no administration in time range)   amLODIPine (NORVASC) tablet 10 mg (10 mg Oral Not Given 3/26/24 1428)     And   atorvastatin (LIPITOR) tablet 80 mg (80 mg Oral Not Given 3/26/24 1428)   bisacodyl (DULCOLAX) rectal suppository 10 mg (has no administration in time range)   clopidogrel (PLAVIX) tablet 75 mg (75 mg Oral Not Given 3/26/24 1429)   gabapentin (NEURONTIN) capsule 300 mg (300 mg Oral Given 3/26/24 1714)   gabapentin (NEURONTIN) capsule 600 mg (has no administration in time range)   latanoprost (XALATAN) 0.005 % ophthalmic solution 1 drop (has no administration in time range)   melatonin tablet 3 mg (has no administration in time range)   pantoprazole (PROTONIX) EC tablet 40 mg (40 mg Oral Given 3/26/24 1716)   polyethylene glycol (MIRALAX) packet 17 g (17 g Oral Refused 3/26/24 1429)   propranolol (INDERAL LA) 24 hr capsule 60 mg (60 mg Oral Not Given 3/26/24 1430)   senna-docusate sodium (SENOKOT S) 8.6-50 mg per tablet 2 tablet (has no administration in time range)   sertraline (ZOLOFT) tablet 25 mg (has no administration in time " range)   acetaminophen (TYLENOL) tablet 650 mg (has no administration in time range)   ondansetron (ZOFRAN) injection 4 mg (4 mg Intravenous Given 3/26/24 1726)   aluminum-magnesium hydroxide-simethicone (MAALOX) oral suspension 30 mL (has no administration in time range)   cefTRIAXone (ROCEPHIN) IVPB (premix in dextrose) 1,000 mg 50 mL (1,000 mg Intravenous New Bag 3/26/24 1432)   heparin (porcine) subcutaneous injection 5,000 Units (5,000 Units Subcutaneous Given 3/26/24 1432)   insulin lispro (HumALOG/ADMELOG) 100 units/mL subcutaneous injection 1-5 Units (1 Units Subcutaneous Given 3/26/24 1714)   insulin lispro (HumALOG/ADMELOG) 100 units/mL subcutaneous injection 1-5 Units (has no administration in time range)   sodium chloride (OCEAN) 0.65 % nasal spray 1 spray (has no administration in time range)   sodium chloride 0.9 % bolus 1,000 mL (0 mL Intravenous Stopped 3/26/24 1009)   fentaNYL injection 50 mcg (50 mcg Intravenous Given 3/26/24 0826)   iohexol (OMNIPAQUE) 350 MG/ML injection (SINGLE-DOSE) 100 mL (100 mL Intravenous Given 3/26/24 0905)   sodium chloride 0.9 % bolus 1,000 mL (0 mL Intravenous Stopped 3/26/24 1247)   ampicillin-sulbactam (UNASYN) 3 g in sodium chloride 0.9 % 100 mL IVPB (0 g Intravenous Stopped 3/26/24 1124)           FINAL IMPRESSION    Final diagnoses:   Cystitis         DISPOSITION/PLAN      Time reflects when diagnosis was documented in both MDM as applicable and the Disposition within this note       Time User Action Codes Description Comment    3/26/2024 10:29 AM Soto Talley Add [Z86.73] History of CVA (cerebrovascular accident)     3/26/2024 10:29 AM Soto Talley Add [R33.9] Urinary retention     3/26/2024 10:29 AM Soto Talley Add [Z93.59] Chronic suprapubic catheter (HCC)     3/26/2024 10:29 AM Soto Talley Remove [R33.9] Urinary retention     3/26/2024 10:29 AM Talley, Soto Polanco [Z93.59] Chronic suprapubic catheter (HCC)     3/26/2024 10:29 AM Soto Talley Remove [Z86.73] History  of CVA (cerebrovascular accident)     3/26/2024 10:54 AM Leatha Bobby Add [N39.0] UTI (urinary tract infection)     3/26/2024 10:54 AM Leatha Bobby Remove [N39.0] UTI (urinary tract infection)     3/26/2024 10:55 AM Leatha Bobby Add [N30.90] Cystitis           ED Disposition       ED Disposition   Admit    Condition   Stable    Date/Time   Tue Mar 26, 2024 10:54 AM    Comment   Case was discussed with LISSETTE and the patient's admission status was agreed to be Admission Status: inpatient status to the service of Dr. Talley .               Follow-up Information    None           PATIENT REFERRED TO:    No follow-up provider specified.    DISCHARGE MEDICATIONS:    Current Discharge Medication List        CONTINUE these medications which have NOT CHANGED    Details   albuterol (2.5 mg/3 mL) 0.083 % nebulizer solution Take 3 mL (2.5 mg total) by nebulization every 6 (six) hours as needed for wheezing or shortness of breath  Qty: 60 mL, Refills: 3    Associated Diagnoses: Shortness of breath      albuterol (Ventolin HFA) 90 mcg/act inhaler Inhale 2 puffs every 6 (six) hours as needed for wheezing  Qty: 18 g, Refills: 5    Comments: Substitution to a formulary equivalent within the same pharmaceutical class is authorized.  Associated Diagnoses: Shortness of breath      ALPRAZolam (XANAX) 0.25 mg tablet Take 0.25 mg by mouth 2 (two) times a day as needed for anxiety or sleep       amLODIPine-atorvastatin (CADUET) 10-80 MG per tablet Take 1 tablet by mouth daily      bisacodyl (DULCOLAX) 10 mg suppository Insert 1 suppository (10 mg total) into the rectum daily as needed for constipation    Associated Diagnoses: Stercoral colitis      Dulaglutide (Trulicity) 0.75 MG/0.5ML SOPN Inject 0.75 mg under the skin once a week      Ergocalciferol (VITAMIN D2 PO) Take 50,000 Units by mouth once a week      !! gabapentin (NEURONTIN) 300 mg capsule Take 1 capsule (300 mg total) by mouth 2 (two) times a  day  Qty: 60 capsule, Refills: 0    Associated Diagnoses: Multiple sclerosis (HCC)      latanoprost (XALATAN) 0.005 % ophthalmic solution Administer 1 drop to both eyes daily at bedtime      melatonin 3 mg Take 3 mg by mouth daily at bedtime as needed      pantoprazole (PROTONIX) 40 mg tablet TAKE 1 TABLET TWICE DAILY 30 MINUTES BEFORE BREAKFAST AND DINNER.  Qty: 180 tablet, Refills: 1    Associated Diagnoses: Gastroesophageal reflux disease without esophagitis      polyethylene glycol (MIRALAX) 17 g packet Take 17 g by mouth daily    Associated Diagnoses: Stercoral colitis      propranolol (INDERAL LA) 60 mg 24 hr capsule Take 60 mg by mouth daily      senna-docusate sodium (SENOKOT S) 8.6-50 mg per tablet Take 2 tablets by mouth daily at bedtime      clopidogrel (PLAVIX) 75 mg tablet Take 1 tablet (75 mg total) by mouth daily  Refills: 0    Associated Diagnoses: Chronic suprapubic catheter (HCC); Neurogenic bladder; Cellulitis      !! gabapentin (NEURONTIN) 300 mg capsule Take 2 capsules (600 mg total) by mouth daily at bedtime  Qty: 30 capsule, Refills: 0    Associated Diagnoses: Multiple sclerosis (HCC)      nitrofurantoin (MACROBID) 100 mg capsule Take 1 capsule (100 mg total) by mouth 2 (two) times a day  Qty: 10 capsule, Refills: 0    Associated Diagnoses: Suprapubic catheter dysfunction, initial encounter ([facility])      sertraline (ZOLOFT) 25 mg tablet Take 25 mg by mouth daily at bedtime       !! - Potential duplicate medications found. Please discuss with provider.          No discharge procedures on file.         Leatha Bobby, DO Leatha Bobby DO  03/26/24 3021

## 2024-03-26 NOTE — H&P
"[facility]  H&P  Name: Mathew Rico 73 y.o. male I MRN: [id number]  Unit/Bed#: ED-11 I Date of Admission: 3/26/2024   Date of Service: 3/26/2024 I Hospital Day: 0  Assessment and Plan  * Sepsis ([facility])  Assessment & Plan  Patient presenting with sepsis of a urinary etiology as evidenced by leukocytosis of 14,000 as well as tachycardia  Received sepsis bolus in the emergency room  Catheter associated urinary tract infection present on admission  Continue ceftriaxone day #1  Cultures and urine sensitivity  Urology consultation placed for suprapubic tube change  Given history of heart failure did not give full 30 cc/kg fluid bolus    Chronic diastolic congestive heart failure (HCC)  Assessment & Plan  Wt Readings from Last 3 Encounters:   03/26/24 64 kg (141 lb 1.5 oz)   03/23/24 66.6 kg (146 lb 13.2 oz)   02/29/24 66.2 kg (145 lb 15.1 oz)   Euvolemic by physical examination  Continue Plavix, continue Inderal continue amlodipine            Type 2 diabetes mellitus with hyperglycemia, without long-term current use of insulin ([facility])  Assessment & Plan  Lab Results   Component Value Date    HGBA1C 8.1 (H) 12/04/2023       No results for input(s): \"POCGLU\" in the last 72 hours.    Blood Sugar Average: Last 72 hrs:    Home regimen reviewed. Hold Metformin if applicable.  Start SSI and Basal bolus protocol    Obstructive sleep apnea  Assessment & Plan  Continue CPAP at bedtime    Multiple sclerosis (HCC)  Assessment & Plan  Not on outpatient treatment  Continue outpatient neurology follow-up    Generalized anxiety disorder  Assessment & Plan  Resume home medication  Mood is stable    Chronic suprapubic catheter (HCC)  Assessment & Plan  Due suprapubic change  Urology consultation placed  History of suprapubic tube placement due to multiple sclerosis and bedbound status        Code Status: Full code    VTE Prophylaxis: Heparin  / sequential compression device     Discussion with family: Patient  " brother and sister at 12:45 pm, no answer    Anticipated Length of Stay:  Patient will be admitted on an Inpatient basis with an anticipated length of stay of greater than 2 midnights.   Justification for Hospital Stay: Sepsis (HCC)     Total Time for Visit, including Counseling / Coordination of Care: 76 minutes.  Greater than 50% of this total time spent on direct patient counseling and coordination of care.    Chief Complaint:     Chief Complaint   Patient presents with    Groin Pain     Pt c/o of groin pain since this morning. Pt has suprapubic dela cruz. Also c/o of dizziness and sweats.       History of Present Illness:    Mathew Rico is a 73 y.o. male who presents with groin pain as well as dizziness.  The patient has a past medical history of multiple sclerosis for which she is not currently on any treatment, diabetes mellitus, obstructive sleep apnea as well as chronic suprapubic tube catheter placement..  He has had multiple admissions in the past due to catheter associated urinary tract infection with organism demonstrating Klebsiella pneumonia and Enterobacter in the past.  Patient was found to have SIRS criteria present on admission including tachycardia and leukocytosis.  He was started on IV fluids and given his age and other comorbidities recommended for inpatient admission.  The patient has a history of chronic diastolic heart failure he is not on any diuretics as an outpatient.  Also has a history of constipation for which he is on scheduled MiraLAX and senna.  At the time my evaluation the patient was requesting that he only stay 1 day.  He denies any other nausea or vomiting.  No abdominal pain.  No recent travel or trip history, he was due for evaluation by urology for suprapubic tube change out    Review of Systems:    A complete and comprehensive 14 point organ system review was performed and all other systems are negative other than stated above in the HPI    Past Medical and Surgical  History:     Past Medical History:   Diagnosis Date    Acute laryngitis     Acute nonsuppurative otitis media, unspecified laterality     Arm weakness     Arthritis     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HCC)     Bladder infection     Bronchitis     Constipation     Cough     Diabetes mellitus (HCC)     Dizziness     Dysfunction of eustachian tube     Erectile dysfunction of non-organic origin     Fatigue     Glaucoma     Hiatal hernia     Hypertension     Imbalance     Leg muscle spasm     MS (multiple sclerosis) (HCC)     Nephrolithiasis     Neurogenic bladder     No natural teeth     Sinus pain     Spinal stenosis     Strain of thoracic region     Stroke ([facility])     Suprapubic catheter ([facility])        Past Surgical History:   Procedure Laterality Date    APPENDECTOMY      BRAIN SURGERY      Coil placed in aneurysm    CYSTOSCOPY      CYSTOSCOPY      CYSTOSCOPY  06/11/2018    CYSTOSCOPY  01/15/2021    EYE SURGERY      transscleral cyclophotocoagulation noncontact YAG laser    MYRINGOTOMY      with ventilation tube insertion    PA LITHOLAPAXY SMPL/SM <2.5 CM N/A 5/7/2019    Procedure: CYSTOSCOPY, holmium laser litholapaxy of bladder stones, EXCHANGE OF SP TUBE;  Surgeon: Javy Jeffries MD;  Location: [facility];  Service: Urology    SUPRAPUBIC CATHETER INSERTION         Meds/Allergies:    Prior to Admission medications    Medication Sig Start Date End Date Taking? Authorizing Provider   albuterol (2.5 mg/3 mL) 0.083 % nebulizer solution Take 3 mL (2.5 mg total) by nebulization every 6 (six) hours as needed for wheezing or shortness of breath 10/3/23  Yes Nelson Cabezas MD   albuterol (Ventolin HFA) 90 mcg/act inhaler Inhale 2 puffs every 6 (six) hours as needed for wheezing 10/3/23  Yes Nelson Cabezas MD   ALPRAZolam (XANAX) 0.25 mg tablet Take 0.25 mg by mouth 2 (two) times a day as needed for anxiety or sleep    Yes Historical Provider, MD   amLODIPine-atorvastatin (CADUET) 10-80 MG per tablet Take 1 tablet by mouth  daily   Yes Historical Provider, MD   bisacodyl (DULCOLAX) 10 mg suppository Insert 1 suppository (10 mg total) into the rectum daily as needed for constipation 2/20/24  Yes Mahad Perea MD   Dulaglutide (Trulicity) 0.75 MG/0.5ML SOPN Inject 0.75 mg under the skin once a week   Yes Historical Provider, MD   Ergocalciferol (VITAMIN D2 PO) Take 50,000 Units by mouth once a week   Yes Historical Provider, MD   gabapentin (NEURONTIN) 300 mg capsule Take 1 capsule (300 mg total) by mouth 2 (two) times a day 6/3/21  Yes Phan Vazquez,    latanoprost (XALATAN) 0.005 % ophthalmic solution Administer 1 drop to both eyes daily at bedtime   Yes Historical Provider, MD   melatonin 3 mg Take 3 mg by mouth daily at bedtime as needed   Yes Historical Provider, MD   pantoprazole (PROTONIX) 40 mg tablet TAKE 1 TABLET TWICE DAILY 30 MINUTES BEFORE BREAKFAST AND DINNER. 3/13/18  Yes Antoni Parrish,    polyethylene glycol (MIRALAX) 17 g packet Take 17 g by mouth daily 2/21/24  Yes Mahad Perea MD   propranolol (INDERAL LA) 60 mg 24 hr capsule Take 60 mg by mouth daily   Yes Historical Provider, MD   senna-docusate sodium (SENOKOT S) 8.6-50 mg per tablet Take 2 tablets by mouth daily at bedtime   Yes Historical Provider, MD   clopidogrel (PLAVIX) 75 mg tablet Take 1 tablet (75 mg total) by mouth daily 10/27/18   Kraig Griffin MD   gabapentin (NEURONTIN) 300 mg capsule Take 2 capsules (600 mg total) by mouth daily at bedtime 6/3/21   Phan Vazquez, DO   nitrofurantoin (MACROBID) 100 mg capsule Take 1 capsule (100 mg total) by mouth 2 (two) times a day 3/22/24   Mary Pearson PA-C   sertraline (ZOLOFT) 25 mg tablet Take 25 mg by mouth daily at bedtime    Historical Provider, MD MAYFIELD have reviewed home medications with patient personally.    Allergies:   Allergies   Allergen Reactions    Cephalexin Rash       Social History:     Marital Status: Single   Occupation: Retired    Substance Use History:   Social  "History     Substance and Sexual Activity   Alcohol Use Not Currently     Social History     Tobacco Use   Smoking Status Former    Current packs/day: 0.00    Average packs/day: 0.5 packs/day for 31.0 years (15.5 ttl pk-yrs)    Types: Cigarettes    Start date:     Quit date:     Years since quittin.2   Smokeless Tobacco Never     Social History     Substance and Sexual Activity   Drug Use No       Family History:    Family History   Problem Relation Age of Onset    Heart attack Mother     Stroke Mother     Heart attack Father     Anuerysm Father         In Stomach        Physical Exam:     Vitals:   Blood Pressure: 152/83 (24 1100)  Pulse: (!) 108 (24 0915)  Temperature: 97.9 °F (36.6 °C) (24)  Temp Source: Oral (24)  Respirations: 17 (24)  Height: 5' 7\" (170.2 cm) (24)  Weight - Scale: 64 kg (141 lb 1.5 oz) (24)  SpO2: 98 % (24 0915)      General: Ill-appearing appearing, no acute distress  HEENT: atraumatic, PERRLA, moist mucosa, normal pharynx, normal tonsils and adenoids, normal tongue, no fluid in sinuses  Neck: Trachea midline, no carotid bruit, no masses  Respiratory: normal chest wall expansion, CTA B, no r/r/w, no rubs  Cardiovascular: RRR, no m/r/g, Normal S1 and S2  Abdomen: Soft, non-tender, non-distended, normal bowel sounds in all quadrants, no hepatosplenomegaly, no tympany  Rectal: deferred  Musculoskeletal: Functional paraplegia  Integumentary: warm, dry, and pink, with no rash, purpura, or petechia  Heme/Lymph: no lymphadenopathy, no bruises  Neurological: Cranial Nerves II-XII grossly intact  Psychiatric: cooperative with normal mood, affect, and cognition    Additional Data:     Lab Results: Laboratory studies reviewed for today    Results from last 7 days   Lab Units 24  0818   WBC Thousand/uL 14.12*   HEMOGLOBIN g/dL 15.2   HEMATOCRIT % 46.0   PLATELETS Thousands/uL 564*   NEUTROS PCT % 75   LYMPHS PCT % " "16   MONOS PCT % 5   EOS PCT % 3     Results from last 7 days   Lab Units 03/26/24  0818   SODIUM mmol/L 135   POTASSIUM mmol/L 3.9   CHLORIDE mmol/L 98   CO2 mmol/L 26   BUN mg/dL 12   CREATININE mg/dL 0.77   ANION GAP mmol/L 11   CALCIUM mg/dL 10.7*   ALBUMIN g/dL 4.2   TOTAL BILIRUBIN mg/dL 0.59   ALK PHOS U/L 92   ALT U/L 14   AST U/L 15   GLUCOSE RANDOM mg/dL 129         Results from last 7 days   Lab Units 03/22/24  1103   POC GLUCOSE mg/dl 148*         Results from last 7 days   Lab Units 03/26/24  1119 03/26/24  0855 03/26/24  0818   LACTIC ACID mmol/L 1.3 2.2*  --    PROCALCITONIN ng/ml  --   --  0.07           Imaging: I have personally reviewed pertinent reports.      CT abdomen pelvis w contrast   Final Result by Manuel Mccabe MD (03/26 0954)      Cystitis despite the presence of suprapubic catheter with perivesical stranding slightly more pronounced when compared with February 15, 2024.      Proctitis with perirectal fat stranding, also similar from February 15, 2024.      Small patchy consolidation in the anterolateral left lower lobe suspicious for small area of focal pneumonia. Low radiation dose noncontrast chest CT follow-up in 3 to 6 months recommended to document interval resolution.      This examination was marked \"immediate notification\" in Epic in order to begin the standard process by which the radiology reading room liaison alerts the referring practitioner.            Workstation performed: [id number]         XR chest 1 view portable    (Results Pending)       EKG, Pathology, and Other Studies Reviewed on Admission:   Reviewed CT scan    Allscripts / Epic Records Reviewed: Yes    ** Please Note: This note was completed in part utilizing [employer] One Software.  Grammatical errors, random word insertions, spelling mistakes, and incomplete sentences may be an occasional consequence of this system secondary to software limitations, ambient noise, and hardware issues.  If " you have any questions or concerns about the content, text, or information contained within the body of this dictation, please contact the provider for clarification.**

## 2024-03-26 NOTE — RESPIRATORY THERAPY NOTE
03/26/24 1404   Respiratory Assessment   Resp Comments Patient declining CPAP for HS. Instructed patient to inform RN if he changes his mind.

## 2024-03-26 NOTE — PLAN OF CARE

## 2024-03-26 NOTE — ASSESSMENT & PLAN NOTE
Due suprapubic change  Urology consultation placed  History of suprapubic tube placement due to multiple sclerosis and bedbound status

## 2024-03-27 LAB
ANION GAP SERPL CALCULATED.3IONS-SCNC: 9 MMOL/L (ref 4–13)
BUN SERPL-MCNC: 9 MG/DL (ref 5–25)
CALCIUM SERPL-MCNC: 9.1 MG/DL (ref 8.4–10.2)
CHLORIDE SERPL-SCNC: 106 MMOL/L (ref 96–108)
CO2 SERPL-SCNC: 23 MMOL/L (ref 21–32)
CREAT SERPL-MCNC: 0.69 MG/DL (ref 0.6–1.3)
ERYTHROCYTE [DISTWIDTH] IN BLOOD BY AUTOMATED COUNT: 13.8 % (ref 11.6–15.1)
GFR SERPL CREATININE-BSD FRML MDRD: 93 ML/MIN/1.73SQ M
GLUCOSE SERPL-MCNC: 121 MG/DL (ref 65–140)
GLUCOSE SERPL-MCNC: 152 MG/DL (ref 65–140)
GLUCOSE SERPL-MCNC: 168 MG/DL (ref 65–140)
GLUCOSE SERPL-MCNC: 177 MG/DL (ref 65–140)
GLUCOSE SERPL-MCNC: 187 MG/DL (ref 65–140)
HCT VFR BLD AUTO: 38.5 % (ref 36.5–49.3)
HGB BLD-MCNC: 12.5 G/DL (ref 12–17)
MCH RBC QN AUTO: 28.8 PG (ref 26.8–34.3)
MCHC RBC AUTO-ENTMCNC: 32.5 G/DL (ref 31.4–37.4)
MCV RBC AUTO: 89 FL (ref 82–98)
PLATELET # BLD AUTO: 443 THOUSANDS/UL (ref 149–390)
PMV BLD AUTO: 8.6 FL (ref 8.9–12.7)
POTASSIUM SERPL-SCNC: 3.3 MMOL/L (ref 3.5–5.3)
PROCALCITONIN SERPL-MCNC: 0.06 NG/ML
RBC # BLD AUTO: 4.34 MILLION/UL (ref 3.88–5.62)
SODIUM SERPL-SCNC: 138 MMOL/L (ref 135–147)
WBC # BLD AUTO: 9.17 THOUSAND/UL (ref 4.31–10.16)

## 2024-03-27 PROCEDURE — 97167 OT EVAL HIGH COMPLEX 60 MIN: CPT

## 2024-03-27 PROCEDURE — 99232 SBSQ HOSP IP/OBS MODERATE 35: CPT | Performed by: INTERNAL MEDICINE

## 2024-03-27 PROCEDURE — 84145 PROCALCITONIN (PCT): CPT | Performed by: INTERNAL MEDICINE

## 2024-03-27 PROCEDURE — 80048 BASIC METABOLIC PNL TOTAL CA: CPT | Performed by: INTERNAL MEDICINE

## 2024-03-27 PROCEDURE — 99222 1ST HOSP IP/OBS MODERATE 55: CPT | Performed by: UROLOGY

## 2024-03-27 PROCEDURE — 82948 REAGENT STRIP/BLOOD GLUCOSE: CPT

## 2024-03-27 PROCEDURE — 97530 THERAPEUTIC ACTIVITIES: CPT | Performed by: PHYSICAL THERAPIST

## 2024-03-27 PROCEDURE — 97163 PT EVAL HIGH COMPLEX 45 MIN: CPT | Performed by: PHYSICAL THERAPIST

## 2024-03-27 PROCEDURE — 85027 COMPLETE CBC AUTOMATED: CPT | Performed by: INTERNAL MEDICINE

## 2024-03-27 RX ORDER — METHENAMINE HIPPURATE 1000 MG/1
1 TABLET ORAL 2 TIMES DAILY WITH MEALS
Qty: 180 TABLET | Refills: 3 | Status: SHIPPED | OUTPATIENT
Start: 2024-03-27

## 2024-03-27 RX ORDER — POTASSIUM CITRATE 10 MEQ/1
20 TABLET, EXTENDED RELEASE ORAL DAILY
Status: DISCONTINUED | OUTPATIENT
Start: 2024-03-27 | End: 2024-03-29 | Stop reason: HOSPADM

## 2024-03-27 RX ADMIN — GABAPENTIN 300 MG: 300 CAPSULE ORAL at 17:41

## 2024-03-27 RX ADMIN — ACETAMINOPHEN 325MG 650 MG: 325 TABLET ORAL at 21:47

## 2024-03-27 RX ADMIN — AMLODIPINE BESYLATE 10 MG: 10 TABLET ORAL at 09:00

## 2024-03-27 RX ADMIN — INSULIN LISPRO 1 UNITS: 100 INJECTION, SOLUTION INTRAVENOUS; SUBCUTANEOUS at 21:40

## 2024-03-27 RX ADMIN — CEFTRIAXONE 1000 MG: 1 INJECTION, SOLUTION INTRAVENOUS at 13:03

## 2024-03-27 RX ADMIN — PROPRANOLOL HYDROCHLORIDE 60 MG: 60 CAPSULE, EXTENDED RELEASE ORAL at 08:16

## 2024-03-27 RX ADMIN — CLOPIDOGREL BISULFATE 75 MG: 75 TABLET ORAL at 08:16

## 2024-03-27 RX ADMIN — LATANOPROST 1 DROP: 50 SOLUTION OPHTHALMIC at 21:41

## 2024-03-27 RX ADMIN — GABAPENTIN 300 MG: 300 CAPSULE ORAL at 08:16

## 2024-03-27 RX ADMIN — PANTOPRAZOLE SODIUM 40 MG: 40 TABLET, DELAYED RELEASE ORAL at 06:15

## 2024-03-27 RX ADMIN — HEPARIN SODIUM 5000 UNITS: 5000 INJECTION INTRAVENOUS; SUBCUTANEOUS at 13:03

## 2024-03-27 RX ADMIN — DOCUSATE SODIUM 50MG AND SENNOSIDES 8.6MG 2 TABLET: 8.6; 5 TABLET, FILM COATED ORAL at 21:45

## 2024-03-27 RX ADMIN — INSULIN LISPRO 1 UNITS: 100 INJECTION, SOLUTION INTRAVENOUS; SUBCUTANEOUS at 16:56

## 2024-03-27 RX ADMIN — HEPARIN SODIUM 5000 UNITS: 5000 INJECTION INTRAVENOUS; SUBCUTANEOUS at 21:40

## 2024-03-27 RX ADMIN — INSULIN LISPRO 1 UNITS: 100 INJECTION, SOLUTION INTRAVENOUS; SUBCUTANEOUS at 08:05

## 2024-03-27 RX ADMIN — SERTRALINE HYDROCHLORIDE 25 MG: 25 TABLET ORAL at 21:41

## 2024-03-27 RX ADMIN — PANTOPRAZOLE SODIUM 40 MG: 40 TABLET, DELAYED RELEASE ORAL at 16:56

## 2024-03-27 RX ADMIN — INSULIN LISPRO 1 UNITS: 100 INJECTION, SOLUTION INTRAVENOUS; SUBCUTANEOUS at 11:38

## 2024-03-27 RX ADMIN — HEPARIN SODIUM 5000 UNITS: 5000 INJECTION INTRAVENOUS; SUBCUTANEOUS at 06:15

## 2024-03-27 RX ADMIN — POTASSIUM CITRATE 20 MEQ: 10 TABLET, EXTENDED RELEASE ORAL at 18:27

## 2024-03-27 RX ADMIN — GABAPENTIN 600 MG: 300 CAPSULE ORAL at 21:41

## 2024-03-27 RX ADMIN — ATORVASTATIN CALCIUM 80 MG: 80 TABLET, FILM COATED ORAL at 08:17

## 2024-03-27 RX ADMIN — ACETAMINOPHEN 325MG 650 MG: 325 TABLET ORAL at 13:11

## 2024-03-27 NOTE — ASSESSMENT & PLAN NOTE
Wt Readings from Last 3 Encounters:   03/27/24 62.7 kg (138 lb 3.7 oz)   03/23/24 66.6 kg (146 lb 13.2 oz)   02/29/24 66.2 kg (145 lb 15.1 oz)   Euvolemic by physical examination  Continue Plavix, continue Inderal continue amlodipine

## 2024-03-27 NOTE — ASSESSMENT & PLAN NOTE
Lab Results   Component Value Date    HGBA1C 6.4 (H) 03/26/2024       Recent Labs     03/26/24  2116 03/27/24  0803 03/27/24  1112 03/27/24  1607   POCGLU 103 168* 152* 187*       Blood Sugar Average: Last 72 hrs:  (P) 158.6  Home regimen reviewed. Hold Metformin if applicable.  Start SSI and Basal bolus protocol

## 2024-03-27 NOTE — PHYSICAL THERAPY NOTE
PT EVALUATION and TREATMENT NOTE    Pt. Name: Mathew Rico  Pt. Age: 73 y.o.  MRN: [id number]  LENGTH OF STAY: 1    Patient Active Problem List   Diagnosis    Diabetic neuropathy (HCC)    Cervical spinal stenosis    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HCC)    Benign colon polyp    Chronic suprapubic catheter (HCC)    Esophageal reflux    Fatty liver    Generalized anxiety disorder    Glaucoma    Hyperlipidemia    Primary hypertension    Multiple sclerosis (HCC)    Obstructive sleep apnea    Thyroid nodule    Type 2 diabetes mellitus with hyperglycemia, without long-term current use of insulin (HCC)    History of CVA (cerebrovascular accident)    Bladder stones    Bladder neck contracture    Pancreatic mass    Pancreatic lesion    Sepsis (HCC)    Excessive daytime sleepiness    Shortness of breath    Hyponatremia    Chronic diastolic congestive heart failure (HCC)    Constipation    Accidental fall from chair    Fall    Weakness    Stercoral colitis       Admitting Diagnoses:   Cystitis [N30.90]  Groin pain [R10.30]  Chronic suprapubic catheter (HCC) [Z93.59]    Past Medical History:   Diagnosis Date    Acute laryngitis     Acute nonsuppurative otitis media, unspecified laterality     Arm weakness     Arthritis     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HCC)     Bladder infection     Bronchitis     Constipation     Cough     Diabetes mellitus (HCC)     Dizziness     Dysfunction of eustachian tube     Erectile dysfunction of non-organic origin     Fatigue     Glaucoma     Hiatal hernia     Hypertension     Imbalance     Leg muscle spasm     MS (multiple sclerosis) (HCC)     Nephrolithiasis     Neurogenic bladder     No natural teeth     Sinus pain     Spinal stenosis     Strain of thoracic region     Stroke (HCC)     Suprapubic catheter (HCC)        Past Surgical History:   Procedure Laterality Date    APPENDECTOMY      BRAIN SURGERY      Coil placed in aneurysm    CYSTOSCOPY      CYSTOSCOPY      CYSTOSCOPY  06/11/2018     "CYSTOSCOPY  01/15/2021    EYE SURGERY      transscleral cyclophotocoagulation noncontact YAG laser    MYRINGOTOMY      with ventilation tube insertion    KS LITHOLAPAXY SMPL/SM <2.5 CM N/A 5/7/2019    Procedure: CYSTOSCOPY, holmium laser litholapaxy of bladder stones, EXCHANGE OF SP TUBE;  Surgeon: Javy Jeffries MD;  [facility];  Service: Urology    SUPRAPUBIC CATHETER INSERTION         Imaging Studies:  XR chest 1 view portable   Final Result by Xavier Jefferson MD (03/26 1706)      Small area of tree-in-bud pattern of opacity in the right lateral midlung field which could be minimal infection/bronchiolitis.            Workstation performed: [id number]         CT abdomen pelvis w contrast   Final Result by Manuel Mccabe MD (03/26 5530)      Cystitis despite the presence of suprapubic catheter with perivesical stranding slightly more pronounced when compared with February 15, 2024.      Proctitis with perirectal fat stranding, also similar from February 15, 2024.      Small patchy consolidation in the anterolateral left lower lobe suspicious for small area of focal pneumonia. Low radiation dose noncontrast chest CT follow-up in 3 to 6 months recommended to document interval resolution.      This examination was marked \"immediate notification\" in Epic in order to begin the standard process by which the radiology reading room liaison alerts the referring practitioner.            Workstation performed: [id number]            03/27/24 1357   PT Last Visit   PT Visit Date 03/27/24   Note Type   Note type Evaluation   Pain Assessment   Pain Assessment Tool 0-10   Pain Score 7   Pain Location/Orientation Orientation: Right;Location: Leg;Location: Groin   Hospital Pain Intervention(s) Repositioned;Ambulation/increased activity;Elevated;Emotional support;Rest   Restrictions/Precautions   Weight Bearing Precautions Per Order No   Other Precautions Cognitive;Chair Alarm;Bed Alarm;Multiple lines;Fall " Risk;Pain   Home Living   Type of Home House   Home Layout One level;Performs ADLs on one level;Able to live on main level with bedroom/bathroom  (1 DAI; use to have a ramp however pt reports it was taken out)   Bathroom Shower/Tub Walk-in shower   Bathroom Toilet Standard   Bathroom Equipment Grab bars in shower;Shower chair;Grab bars around toilet;Commode   Home Equipment Walker;Cane;Hospital bed;Wheelchair-manual;Crutches;Other (Comment)  (sit to stand)   Prior Function   Level of [place] Needs assistance with ADLs;Needs assistance with functional mobility;Modified independent with wheelchair;Needs assistance with IADLS  (w/ manual wheelchair)   Lives With Family  (Sister and brother)   Receives Help From Family   IADLs Family/Friend/Other provides transportation;Family/Friend/Other provides meals;Family/Friend/Other provides medication management   Falls in the last 6 months 1 to 4  (1x)   Vocational Retired   Comments PTA, pt states he was independent with stand pivot transfers from bed to wheelchair. Needs assistance with IADLs, and ADLs. (+) fall risk. (-) .   General   Family/Caregiver Present No   Cognition   Overall Cognitive Status Impaired   Arousal/Participation Alert   Attention Attends with cues to redirect   Orientation Level Oriented to person;Oriented to place;Oriented to situation;Disoriented to time   Following Commands Follows one step commands with increased time or repetition   Subjective   Subjective I would have done better this morning now my legs are tired from laying in bed all day   RUE Assessment   RUE Assessment   (refer to OT)   LUE Assessment   LUE Assessment   (refer to OT)   RLE Assessment   RLE Assessment X  (unable to achieve full knee extension; 3+/5 grossly)   LLE Assessment   LLE Assessment X  (unable to achieve full knee extension; 3+/5 grossly)   Light Touch   RLE Light Touch Grossly intact   LLE Light Touch Grossly intact   Bed Mobility   Supine to Sit 2  Maximal  assistance   Additional items Assist x 2;HOB elevated;Bedrails;Increased time required;Verbal cues;LE management   Sit to Supine Unable to assess   Additional Comments Pt greeted in supine. /73 EOB. Required mod<>maxA of sitting trunk balance throughout session at EOB. Significant cues to maintain hand placement on bilateral bed rails to assist in trunk support.   Transfers   Sit to Stand 2  Maximal assistance   Additional items Assist x 2;Bedrails;Increased time required;Verbal cues  (w/ RW and bed elevated)   Stand to Sit 2  Maximal assistance   Additional items Assist x 2;Armrests;Increased time required;Verbal cues  (w/ RW)   Additional Comments BP post standing 123/64 with subjective report of inc dizziness. Inc cues to inc trunk extension and bilateral knee extension.   Ambulation/Elevation   Gait pattern Not appropriate   Ambulation/Elevation Additional Comments attempted to advance LE however pt unable to clear level tile surface with both verbal and tactile cues. Use of quick move for OOB. During evaluation, bed was switched with recliner during static standing.   Balance   Static Sitting Poor +   Dynamic Sitting Poor   Static Standing Poor -  (w/ RW)   Dynamic Standing Poor -  (w/ RW)   Ambulatory Zero   Endurance Deficit   Endurance Deficit Yes   Endurance Deficit Description pain and weakness   Activity Tolerance   Activity Tolerance Patient limited by fatigue;Patient limited by pain   Medical Staff Made Aware OT student KARLA Pérez   Nurse Made Aware GYPSY Epstein   Assessment   Prognosis Fair   Problem List Decreased strength;Decreased range of motion;Decreased endurance;Impaired balance;Decreased mobility;Decreased cognition;Impaired judgement;Decreased safety awareness;Pain   Assessment Pt. 73 y.o.male presents with groin and abdominal pain. Past medical hx includes CHF, MS, suprapubic catheter. Pt admitted for Sepsis (HCC) w/ Cystitis (N30.90)  Groin pain (R10.30)  Chronic suprapubic catheter  ([facility]) (Z93.59). Pt referred to PT for functional mobility evaluation & D/C planning. Cleared by Dr. Talley for OOB mobility. Pain 7/10 in R leg and groin. PTA, pt reports being (+) hx of falls  and needing assistance with ADLs, and IADLs, and mod I for wheelchair propulsion. Able to stand pivot independently from bed<>wheelchair. During evaluation, deficits included dec mobility, balance, ambulation. Required maxAx2 for supine to sit, sit<>stand and standing balance. Required mod<>maxA of sitting trunk balance throughout session at EOB. Significant cues to maintain hand placement on bilateral bed rails to assist in trunk support. Upon standing, Inc cues to inc trunk extension and bilateral knee extension. Attempted to advance LE however pt unable to clear level tile surface with both verbal and tactile cues. Use of quick move for OOB. See treatment assessment for further functional mobility. Pt demonstrated dec endurance and tolerance to activity. Mild dizziness throughout session however vitals stable. See flowsheet for BP values. Pt was educated on fall precautions and reinforced w/ good understanding. Pt would benefit from continued PT to address deficits as defined above and maximize level of independence with functional mobility and safety. Based on pt presentation and impaired function, pt would benefit from level II, (moderate resource intensity) at D/C.   The patient's AM-PAC Basic Mobility Inpatient Short Form Raw Score is 10. A Raw score of less than or equal to 16 suggests the patient may benefit from discharge to post-acute rehabilitation services. Please also refer to the recommendation of the Physical Therapist for safe discharge planning. CM to follow. Co-eval performed to complete this PT evaluation for the pts best interest given pts medical complexity and functional level.   Barriers to Discharge Decreased caregiver support   Barriers to Discharge Comments ?level of support at home   Goals   Patient  Goals to go home tomorrow   [facility] Expiration Date 04/10/24   Short Term Goal #1 1) Inc overall LE strength by 1/2 MMT grade to improve functional mobility; 2) Pt will demonstrate improved bed mobility with minAx1 to dec caregiver burden; 3) Pt will demonstrate improved transfers w/ modAx1 for inc safety; 4) Pt will demonstrate ability to propel manual wheelchair 10' without assistance to dec caregiver burden; 5) Improve general balance by 1 grade to inc safety; 6) PT for ongoing patient and caregiver education   PT Treatment Day 0   Plan   Treatment/Interventions Functional transfer training;LE strengthening/ROM;Therapeutic exercise;Endurance training;Patient/family training;Bed mobility;Gait training;Spoke to nursing;OT   PT Frequency 3-5x/wk   Discharge Recommendation   Rehab Resource Intensity Level, PT II (Moderate Resource Intensity)   AM-PAC Basic Mobility Inpatient   Turning in Flat Bed Without Bedrails 2   Lying on Back to Sitting on Edge of Flat Bed Without Bedrails 2   Moving Bed to Chair 2   Standing Up From Chair Using Arms 2   Walk in Room 1   Climb 3-5 Stairs With Railing 1   Basic Mobility Inpatient Raw Score 10   [facility] Highest Level Of Mobility   -HL Goal 4: Move to chair/commode   -HL Achieved 4: Move to chair/commode   Additional Treatment Session   Start Time 1345   End Time 1357   Treatment Assessment Following initial evaluation, pt able to tolerate further functional mobility. Pt would benefit from use of quickmove for OOB mobility and improved safety. Due to pt unable to perform ambulation during evaluation, OT helped replace bed with recliner while pt performed static standing with RW and maxAx2. Pt able to tolerate static standing ~2 minutes with RW and maxAx2. Required maxAx2 for sit<>stand. Cues provided to improved trunk and knee extension throughout standing and for proper hand placement. Pt in recliner at end of session with chair alarm activated and all needs within reach.  BP in chair 133/61. RN notified.   Equipment Use RW   Additional Treatment Day 1   Exercises   Balance training  Pt able to tolerate static standing ~2 minutes with RW and maxAx2.   End of Consult   Patient Position at End of Consult Bedside chair;Bed/Chair alarm activated;All needs within reach       Hx/personal factors: co-morbidities, inaccessible home, dec caregiver support, advanced age, mutliple lines, dec cognition, pain, h/o of falls, fall risk, and assist w/ ADL's, coping styles, social background, past experience, behavior pattern  Examination: dec mobility, dec balance, dec endurance, dec amb, risk for falls, pain, dec cognition, assessed body system, balance, endurance, amb, D/C disposition & fall risk, impairements in locomotion, musculoskeletal, balance, endurance, posture, coordination, assessed cognition, impairments in systems including multiple body structures involved; musculoskeletal (ROM, strength, posture, BMI), neuromuscular (balance,locomotion, gait, transfers, motor control and learning, sensation), joint integrity, integumentary (skin integrity, presence of scars or wounds), cardiopulmonary (vitals, edema); activity limitations (difficulties executing an action); participation restrictions (problems associated w involvement in life situations)  Clinical: unpredictable (ongoing medical status, abnormal lab values, risk for falls, and pain mgt)  Complexity: high      Zaida Mcfarland, PT

## 2024-03-27 NOTE — CASE MANAGEMENT
Case Management Assessment & Discharge Planning Note    Patient name Mathew Rico  Location [facility] /E5 -* MRN [id number]  : 1950 Date 3/27/2024       Current Admission Date: 3/26/2024  Current Admission Diagnosis:Sepsis (HCC)   Patient Active Problem List    Diagnosis Date Noted    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    Fall 2023    Weakness 2023    Accidental fall from chair 2023    Constipation 2023    Chronic diastolic congestive heart failure (HCC) 2023    Hyponatremia 2023    Excessive daytime sleepiness 2022    Shortness of breath 2022    Sepsis (HCC) 2021    Pancreatic mass 2020    Pancreatic lesion 2020    Bladder stones 2019    Bladder neck contracture 2019    History of CVA (cerebrovascular accident) 10/21/2018    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[facility]) 2017    Diabetic neuropathy ([facility]) 2016    Chronic suprapubic catheter ([facility]) 2016    Thyroid nodule 2015    Cervical spinal stenosis 2014    Generalized anxiety disorder 2014    Urinary retention 2014    Obstructive sleep apnea 2013    Benign colon polyp 2012    Esophageal reflux 2012    Fatty liver 2012    Glaucoma 2012    Hyperlipidemia 2012    Multiple sclerosis (HCC) 2012    Type 2 diabetes mellitus with hyperglycemia, without long-term current use of insulin ([facility]) 2012    Primary hypertension 2012      LOS (days): 1  Geometric Mean LOS (GMLOS) (days): 4.9  Days to GMLOS:3.8     OBJECTIVE:    Risk of Unplanned Readmission Score: 34.53         Current admission status: Inpatient       Preferred Pharmacy:   [facility]/pharmacy #[id number] - [place] - [address] [address]  Phone: [phone] Fax: [phone]    [place]  [address] [facility] [address]  Phone: [phone] Fax:  [phone]    [facility] [place], [place] - [address]  [address] [place]  [facility] [address]  Phone: [phone] Fax: [phone]    Primary Care Provider: Steve Dickerson DO    Primary Insurance: [facility]  Secondary Insurance:     ASSESSMENT:  Active Health Care Proxies       Guzman Rico Health Care Representative - Brother   Primary Phone: [phone] (Home)                 Advance Directives  Does patient have a Health Care POA?: No  Was patient offered paperwork?: Yes  Does patient currently have a Health Care decision maker?: Yes, please see Health Care Proxy section  Does patient have Advance Directives?: No  Was patient offered paperwork?: Yes  Primary Contact: Brother: Guzman Leon and Sister Sully Peng [phone] (Home Phone)         Readmission Root Cause  30 Day Readmission: No    Patient Information  Admitted from:: Home  Mental Status: Alert  During Assessment patient was accompanied by: Not accompanied during assessment  Assessment information provided by:: Brother  Primary Caregiver: Family  Caregiver's Name:: Guzman Rico (Brother)  [phone] (Home Phone)  Caregiver's Relationship to Patient:: Family Member  Caregiver's Telephone Number:: Guzman Rico (Brother)  [phone] (Home Phone)  Support Systems: Family members  [facility] of Residence: [place]  What [facility] do you live in?: [place]  Home entry access options. Select all that apply.: Stairs  Number of steps to enter home.: 1  Do the steps have railings?: No  Type of Current Residence: [facility]  Living Arrangements: Other (Comment) (Lives with siblings)  Is patient a ?: No    Activities of Daily Living Prior to Admission  Functional Status: Total dependent  Completes ADLs independently?: No  Level of ADL dependence: Total Dependent  Ambulates independently?: No  Level of ambulatory dependence: Total Dependent  Does patient use assisted devices?:  Yes  Assisted Devices (DME) used: Wheelchair, Bedside Commode, Shower Chair, Hospital Bed, Walker  DME Company Name (respiratory supplies): [employer]  Does patient currently own DME?: Yes  What DME does the patient currently own?: Wheelchair, Bedside Commode, Hospital Bed, Walker, Shower Chair  Does patient have a history of Outpatient Therapy (PT/OT)?: Yes (Arranged by [facility])  Does the patient have a history of Short-Term Rehab?: Yes ([facility])  Does patient have a history of HHC?: Yes (Arranged by [facility])  Does patient currently have HHC?: Yes (Arranged By [facility])    Current Home Health Care  Type of Current Home Care Services: Home health aide  Current Home Health Agency:: Other (please enter name in comment) (Arranged by [facility])  Current Home Health Follow-Up Provider:: PCP    Patient Information Continued  Income Source: Pension/intermediate  Does patient have prescription coverage?: Yes  Does patient receive dialysis treatments?: No  Does patient have a history of substance abuse?: No  Does patient have a history of Mental Health Diagnosis?: Yes (Anxiety per family)  Is patient receiving treatment for mental health?: No. Patient declined treatment information.  Has patient received inpatient treatment related to mental health in the last 2 years?: No    PHQ 2/9 Screening   Reviewed PHQ 2/9 Depression Screening Score?: No    Means of Transportation  Means of Transport to Appts:: Family transport      Social Determinants of Health (SDOH)      Flowsheet Row Most Recent Value   Housing Stability    In the last 12 months, was there a time when you were not able to pay the mortgage or rent on time? N   In the last 12 months, was there a time when you did not have a steady place to sleep or slept in a shelter (including now)? N   Transportation Needs    In the past 12 months, has lack of transportation kept you from medical appointments or from getting medications? no   In the past 12 months,  has lack of transportation kept you from meetings, work, or from getting things needed for daily living? No   Food Insecurity    Within the past 12 months, you worried that your food would run out before you got the money to buy more. Never true   Utilities    In the past 12 months has the electric, gas, oil, or water company threatened to shut off services in your home? No            DISCHARGE DETAILS:    Discharge planning discussed with:: Family  Freedom of Choice: Yes  Comments - Freedom of Choice: Pending family choice  CM contacted family/caregiver?: Yes  Were Treatment Team discharge recommendations reviewed with patient/caregiver?: Yes  Did patient/caregiver verbalize understanding of patient care needs?: Yes  Were patient/caregiver advised of the risks associated with not following Treatment Team discharge recommendations?: Yes    Contacts  Patient Contacts: Guzman Rioc (Brother) [phone]  Relationship to Patient:: Family  Contact Method: Phone  Phone Number: Guzman Rico (Brother) [phone]  Reason/Outcome: Discharge Planning          Would you like to participate in our [facility] service program?  : No - Declined        Additional Comments: CM confirmed all info on facesheet is correct. Patient was last d/c from hospital on 2/20/24 to [facility]. Patient lives with his siblings who provide all his care needs. His Home services are provided by [facility]. CM left  for Renay Soler of [email or web address] to call back ([phone]). Patient request max assistance with his ADLs and Ambulation. CM will continue to monitor for d/c needs and make arrangements with family and [facility].

## 2024-03-27 NOTE — ASSESSMENT & PLAN NOTE
SPT tube exchanged  Urology consultation placed  History of suprapubic tube placement due to multiple sclerosis and bedbound status

## 2024-03-27 NOTE — UTILIZATION REVIEW
Notification of Unplanned, Urgent, or   Emergency Inpatient Admission   AUTHORIZATION REQUEST   Admitting Facility [place] [address]  Tax ID: [id number]  NPI: [id number]  [facility] of Service: [facility]  Admission Level of Care: Inpatient  Place of Service Code: 21     RN is working on review will send once completed   Attending Physician Information  Attending Name and NPI#: Soto Talley Do [[id number]]  Phone: [phone]     Admission Information  Inpatient Admission Date/Time: 3/26/24 10:27 AM  Discharge Date/Time: No discharge date for patient encounter.  Admitting Diagnosis Code/Description:  Cystitis [N30.90]  Groin pain [R10.30]  Chronic suprapubic catheter (HCC) [Z93.59]     Utilization Review Contact  Marsha Garcia Utilization   Phone: [phone]  Fax: [phone]  Email: [email or web address]  Contact for approvals/pending authorizations, clinical reviews, and discharge.     Physician Advisory Services Contact  Medical Necessity Denial & Igdj-er-Mocg Discussion  Phone: [phone]  Fax: [phone]  Email: [email or web address]     DISCHARGE SUPPORT TEAM:  For Patients Discharge Needs & Updates  Phone: [phone] opt. 2 Fax: [phone]  Email: [email or web address]

## 2024-03-27 NOTE — PROGRESS NOTES
[facility]  Progress Note  Name: Mathew Rico I  MRN: [id number]  Unit/Bed#: E5 -01 I Date of Admission: 3/26/2024   Date of Service: 3/27/2024 I Hospital Day: 1    Assessment/Plan   * Sepsis (Formerly [facility])  Assessment & Plan  Patient presenting with sepsis of a urinary etiology as evidenced by leukocytosis of 14,000 as well as tachycardia  Received sepsis bolus in the emergency room  Catheter associated urinary tract infection present on admission  Continue ceftriaxone day #2/5-can be transition to oral antibiotic at discharge  Urine culture with Candida tropicalis, likely colonization  Blood cell count has normalized and procalcitonin remains negative  Multiple admissions would be reasonable for Hipprex and vitamin C post abx treatment    Chronic diastolic congestive heart failure (HCC)  Assessment & Plan  Wt Readings from Last 3 Encounters:   03/27/24 62.7 kg (138 lb 3.7 oz)   03/23/24 66.6 kg (146 lb 13.2 oz)   02/29/24 66.2 kg (145 lb 15.1 oz)   Euvolemic by physical examination  Continue Plavix, continue Inderal continue amlodipine            Type 2 diabetes mellitus with hyperglycemia, without long-term current use of insulin (Formerly [facility])  Assessment & Plan  Lab Results   Component Value Date    HGBA1C 6.4 (H) 03/26/2024       Recent Labs     03/26/24  2116 03/27/24  0803 03/27/24  1112 03/27/24  1607   POCGLU 103 168* 152* 187*       Blood Sugar Average: Last 72 hrs:  (P) 158.6  Home regimen reviewed. Hold Metformin if applicable.  Start SSI and Basal bolus protocol    Obstructive sleep apnea  Assessment & Plan  Continue CPAP at bedtime    Multiple sclerosis (HCC)  Assessment & Plan  Not on outpatient treatment  Continue outpatient neurology follow-up    Generalized anxiety disorder  Assessment & Plan  Resume home medication  Mood is stable    Chronic suprapubic catheter (HCC)  Assessment & Plan  SPT tube exchanged  Urology consultation placed  History of suprapubic tube placement due  to multiple sclerosis and bedbound status               Hospital Course:     73-year-old male patient with history of multiple sclerosis and chronic suprapubic catheter infection    Assessment:      Principal Problem:    Sepsis (HCC)  Active Problems:    Chronic suprapubic catheter (HCC)    Generalized anxiety disorder    Multiple sclerosis (HCC)    Obstructive sleep apnea    Type 2 diabetes mellitus with hyperglycemia, without long-term current use of insulin (HCC)    Chronic diastolic congestive heart failure (HCC)    Plan:    Continue ceftriaxone will plan to transition to oral antibiotic at discharge  Commend Hip-Iglesia and vitamin C       VTE Pharmacologic Prophylaxis:   Pharmacologic: Heparin  Mechanical VTE Prophylaxis in Place: Yes    AM-PAC Basic Mobility:  Basic Mobility Inpatient Raw Score: 10    JH-HLM Achieved: 4: Move to chair/commode  JH-HLM Goal: 4: Move to chair/commode    HLM Goal listed above. Continue with multidisciplinary rounding and encourage appropriate mobility to improve upon HLM goals.         Patient Centered Rounds: Case discussed and reviewed with nursing    Discussions with Specialists or Other Care Team Provider: Case management    Education and Discussions with Family / Patient: Discussed with patient brother    Time Spent for Care: 80 minutes.  More than 50% of total time spent on counseling and coordination of care as described above.    Current Length of Stay: 1 day(s)    Current Patient Status: Inpatient   Certification Statement: The patient will continue to require additional inpatient hospital stay due to discharge planning, likely tomorrow    Discharge Plan / Estimated Discharge Date: Tomorrow    Code Status: Level 1 - Full Code      Subjective:   Seen and examined, no acute complaints.  Feels well, no nausea vomiting.  Wants to go home    A complete and comprehensive 14 point organ system review has been performed and all other systems are negative other than stated  above.    Objective:     Vitals:   Temp (24hrs), Av.6 °F (37 °C), Min:98.4 °F (36.9 °C), Max:98.7 °F (37.1 °C)    Temp:  [98.4 °F (36.9 °C)-98.7 °F (37.1 °C)] 98.4 °F (36.9 °C)  HR:  [78-84] 84  Resp:  [17-19] 17  BP: (123-151)/(61-74) 124/73  SpO2:  [96 %] 96 %  Body mass index is 21.65 kg/m².     Input and Output Summary (last 24 hours):       Intake/Output Summary (Last 24 hours) at 3/27/2024 1708  Last data filed at 3/27/2024 1242  Gross per 24 hour   Intake 420 ml   Output 1550 ml   Net -1130 ml       Physical Exam:     General: well appearing, no acute distress  HEENT: atraumatic, PERRLA, moist mucosa, normal pharynx, normal tonsils and adenoids, normal tongue, no fluid in sinuses  Neck: Trachea midline, no carotid bruit, no masses  Respiratory: normal chest wall expansion, CTA B, no r/r/w, no rubs  Cardiovascular: RRR, no m/r/g, Normal S1 and S2  Abdomen: Soft, non-tender, non-distended, normal bowel sounds in all quadrants, no hepatosplenomegaly, no tympany  Rectal: deferred  Musculoskeletal: Paraplegia  Integumentary: warm, dry, and pink, with no rash, purpura, or petechia  Heme/Lymph: no lymphadenopathy, no bruises  Neurological: Cranial Nerves II-XII grossly intact  Psychiatric: cooperative with normal mood, affect, and cognition      Additional Data:     Labs:    Results from last 7 days   Lab Units 24  0531 24  1350 24  0818   WBC Thousand/uL 9.17  --  14.12*   HEMOGLOBIN g/dL 12.5  --  15.2   HEMATOCRIT % 38.5  --  46.0   PLATELETS Thousands/uL 443*   < > 564*   NEUTROS PCT %  --   --  75   LYMPHS PCT %  --   --  16   MONOS PCT %  --   --  5   EOS PCT %  --   --  3    < > = values in this interval not displayed.     Results from last 7 days   Lab Units 24  0531 24  0818   POTASSIUM mmol/L 3.3* 3.9   CHLORIDE mmol/L 106 98   CO2 mmol/L 23 26   BUN mg/dL 9 12   CREATININE mg/dL 0.69 0.77   CALCIUM mg/dL 9.1 10.7*   ALK PHOS U/L  --  92   ALT U/L  --  14   AST U/L  --  15            * I Have Reviewed All Lab Data Listed Above.  * Additional Pertinent Lab Tests Reviewed: All Labs For Current Hospital Admission Reviewed    Imaging:    Imaging Reports Reviewed Today Include: No new imaging  Imaging Personally Reviewed by Myself Includes: No new imaging    Recent Cultures (last 7 days):     Results from last 7 days   Lab Units 03/26/24  0923 03/26/24  0855 03/26/24  0822 03/22/24  1022   BLOOD CULTURE  No Growth at 24 hrs. No Growth at 24 hrs.  --   --    URINE CULTURE   --   --  10,000-19,000 cfu/ml Candida tropicalis* 80,000-89,000 cfu/ml Klebsiella pneumoniae*  20,000-29,000 cfu/ml Candida tropicalis*  20,000-29,000 cfu/ml Lactobacillus species*       Last 24 Hours Medication List:   Current Facility-Administered Medications   Medication Dose Route Frequency Provider Last Rate    acetaminophen  650 mg Oral Q6H PRN Soto Talley, DO      albuterol  2.5 mg Nebulization Q6H PRN Soto Talley, DO      ALPRAZolam  0.25 mg Oral BID PRN Soto Talley, DO      aluminum-magnesium hydroxide-simethicone  30 mL Oral Q6H PRN Soto Talley DO      amLODIPine  10 mg Oral Daily Soto Talley DO      And    atorvastatin  80 mg Oral Daily Soto Talley, DO      bisacodyl  10 mg Rectal Daily PRN Soto Talley, DO      cefTRIAXone  1,000 mg Intravenous Q24H Soto Talley, DO 1,000 mg (03/27/24 1303)    clopidogrel  75 mg Oral Daily Soto Talley, DO      gabapentin  300 mg Oral BID Soto Talley, DO      gabapentin  600 mg Oral HS Soto Talley, DO      heparin (porcine)  5,000 Units Subcutaneous Q[facility]H [facility]      insulin lispro  1-5 Units Subcutaneous TID AC Soto Talley, DO      insulin lispro  1-5 Units Subcutaneous HS Soto Talley, DO      latanoprost  1 drop Both Eyes HS Soto Tlaley, DO      melatonin  3 mg Oral HS PRN Soto Talley, DO      ondansetron  4 mg Intravenous Q6H PRN Soto Talley, DO      pantoprazole  40 mg Oral BID  AC Soto Talley DO      polyethylene glycol  17 g Oral Daily Soto Talley DO      propranolol  60 mg Oral Daily Soto Talley DO      senna-docusate sodium  2 tablet Oral HS Soto Talley DO      sertraline  25 mg Oral HS Soto Talley DO      sodium chloride  1 spray Each Nare Q1H PRN Soto Tallye DO         AM-PAC Basic Mobility:  Basic Mobility Inpatient Raw Score: 10    JH-HLM Achieved: 4: Move to chair/commode  JH-HLM Goal: 4: Move to chair/commode    HLM Goal listed above. Continue with multidisciplinary rounding and encourage appropriate mobility to improve upon HLM goals.     Today, Patient Was Seen By: Soto Talley DO    ** Please Note: This note was completed in part utilizing [employer] software dictation.  Grammatical errors, random word insertions, spelling mistakes, and incomplete sentences may be an occasional consequence of this system secondary to software limitations, ambient noise, and hardware issues.  If you have any questions or concerns about the content, text, or information contained within the body of this dictation, please contact the provider for clarification. **

## 2024-03-27 NOTE — ASSESSMENT & PLAN NOTE
Patient presenting with sepsis of a urinary etiology as evidenced by leukocytosis of 14,000 as well as tachycardia  Received sepsis bolus in the emergency room  Catheter associated urinary tract infection present on admission  Continue ceftriaxone day #2/5-can be transition to oral antibiotic at discharge  Urine culture with Candida tropicalis, likely colonization  Blood cell count has normalized and procalcitonin remains negative  Multiple admissions would be reasonable for Hipprex and vitamin C post abx treatment

## 2024-03-27 NOTE — UTILIZATION REVIEW
Initial Clinical Review    Admission: Date/Time/Statement:   Admission Orders (From admission, onward)       Ordered        03/26/24 1027  Inpatient Admission  Once                          Orders Placed This Encounter   Procedures    Inpatient Admission     Standing Status:   Standing     Number of Occurrences:   1     Order Specific Question:   Level of Care     Answer:   Med Surg [16]     Order Specific Question:   Bed Type     Answer:   Jerry [4]     Order Specific Question:   Estimated length of stay     Answer:   More than 2 Midnights     Order Specific Question:   Certification     Answer:   I certify that inpatient services are medically necessary for this patient for a duration of greater than two midnights. See H&P and MD Progress Notes for additional information about the patient's course of treatment.     ED Arrival Information       Expected   -    Arrival   3/26/2024 07:23    Acuity   Urgent              Means of arrival   Stretcher    Escorted by   [facility] ([facility])    Service   Hospitalist    Admission type   Emergency              Arrival complaint   medical problem             Chief Complaint   Patient presents with    Groin Pain     Pt c/o of groin pain since this morning. Pt has suprapubic dela curz. Also c/o of dizziness and sweats.       Initial Presentation: 73 y.o. male who presented by EMS to [facility] ED. Inpatient admission for evaluation and treatment of sepsis. PMHx: arthritis, T2DM, glaucoma, HTN, Multiple Sclerosis (not on any treatment currently), neurogenic bladder (chronic suprapubic dela cruz), stroke. Presented w/ groin pain, dizziness. On exam, tachycardic, functional paraplegia. Imaging shows cystitis despite suprapubic cath. UA shows UTI. WBC 14.12. Plan: IV ABX, follow urine culture, IVF, continue PTA meds, SSI w/ BG checks ACHS, Trend labs, replete electrolytes as needed. Urology consulted.      Date: 03/27/24   Day 2: Reports feeling well. Continue IV  ABX. Follow culture for sensitivities. Continue current meds, Trend labs, replete electrolytes as needed.    Urology: Pt w/ chronic suprapubic catheter. Draining appropriately. Change tube q4 weeks instead of current q5 weeks. Will attempt to up size cath to 20 Fr at next exchange. Can use daily or PRN sterile water flushes      03/28/24 Day 3: Has surpassed a 2nd midnight with active treatments and services. Exam unremarkable for acute changes. Plan: continue IV ABX, continue current meds, so far blood cultures negative. Trend labs, replete electrolytes as needed.    ED Triage Vitals [03/26/24 0728]   Temperature Pulse Respirations Blood Pressure SpO2   97.9 °F (36.6 °C) (!) 112 17 162/72 98 %      Temp Source Heart Rate Source Patient Position - Orthostatic VS BP Location FiO2 (%)   Oral Monitor Lying Right arm --      Pain Score       5          Wt Readings from Last 1 Encounters:   03/28/24 62.4 kg (137 lb 9.1 oz)     Additional Vital Signs:   Date/Time Temp Pulse Resp BP MAP (mmHg) SpO2 O2 Device   03/28/24 07:24:35 98.3 °F (36.8 °C) 78 14 128/56 80 97 % None (Room air)   03/27/24 2256 98.2 °F (36.8 °C) -- 16 149/68 101 -- --   03/27/24 1521 98.4 °F (36.9 °C) -- 17 124/73 96 -- --     Date/Time Temp Pulse Resp BP MAP (mmHg) SpO2 O2 Device   03/27/24 13:55:39 -- -- -- 133/61 85 -- --   03/27/24 13:54:36 -- -- -- 133/61 85 -- --   03/27/24 13:54:07 -- -- -- 133/61 85 -- --   03/27/24 13:48:07 -- -- -- 123/64 84 -- --   03/27/24 13:40:13 -- -- -- 151/74 100 -- --   03/27/24 07:58:55 98.7 °F (37.1 °C) 84 18 148/68 95 -- None (Room air)   03/26/24 23:27:27 98.6 °F (37 °C) 78 19 128/63 85 96 % None (Room air)   03/26/24 13:21:33 98.3 °F (36.8 °C) -- 16 140/75 97 -- --   03/26/24 1230 -- 105 16 145/79 -- 98 % --   03/26/24 1100 -- -- -- 152/83 109 -- --   03/26/24 0915 -- 108 Abnormal  -- 165/101 Abnormal  118 98 % --     Pertinent Labs/Diagnostic Test Results:   XR chest 1 view portable   Final Result by Xavier  "Manuel Jefferson MD (03/26 1706)      Small area of tree-in-bud pattern of opacity in the right lateral midlung field which could be minimal infection/bronchiolitis.            Workstation performed: [id number]         CT abdomen pelvis w contrast   Final Result by Manuel Mccabe MD (03/26 0910)      Cystitis despite the presence of suprapubic catheter with perivesical stranding slightly more pronounced when compared with February 15, 2024.      Proctitis with perirectal fat stranding, also similar from February 15, 2024.      Small patchy consolidation in the anterolateral left lower lobe suspicious for small area of focal pneumonia. Low radiation dose noncontrast chest CT follow-up in 3 to 6 months recommended to document interval resolution.      This examination was marked \"immediate notification\" in Epic in order to begin the standard process by which the radiology reading room liaison alerts the referring practitioner.            Workstation performed: [id number]               Results from last 7 days   Lab Units 03/28/24  0626 03/27/24  0531 03/26/24  1350 03/26/24  0818   WBC Thousand/uL 9.64 9.17  --  14.12*   HEMOGLOBIN g/dL 12.4 12.5  --  15.2   HEMATOCRIT % 37.8 38.5  --  46.0   PLATELETS Thousands/uL 407* 443* 468* 564*   NEUTROS ABS Thousands/µL  --   --   --  10.59*         Results from last 7 days   Lab Units 03/28/24  0626 03/27/24  0531 03/26/24  0818   SODIUM mmol/L 136 138 135   POTASSIUM mmol/L 3.6 3.3* 3.9   CHLORIDE mmol/L 105 106 98   CO2 mmol/L 24 23 26   ANION GAP mmol/L 7 9 11   BUN mg/dL 9 9 12   CREATININE mg/dL 0.68 0.69 0.77   EGFR ml/min/1.73sq m 94 93 89   CALCIUM mg/dL 9.1 9.1 10.7*     Results from last 7 days   Lab Units 03/26/24  0818   AST U/L 15   ALT U/L 14   ALK PHOS U/L 92   TOTAL PROTEIN g/dL 8.6*   ALBUMIN g/dL 4.2   TOTAL BILIRUBIN mg/dL 0.59     Results from last 7 days   Lab Units 03/28/24  0727 03/27/24 2117 03/27/24  1607 03/27/24  1112 03/27/24  0803 03/26/24  2116 " 03/26/24  1613 03/22/24  1103   POC GLUCOSE mg/dl 129 177* 187* 152* 168* 103 183* 148*     Results from last 7 days   Lab Units 03/28/24  0626 03/27/24  0531 03/26/24  0818   GLUCOSE RANDOM mg/dL 126 121 129         Results from last 7 days   Lab Units 03/26/24  0818   HEMOGLOBIN A1C % 6.4*   EAG mg/dl 137     Results from last 7 days   Lab Units 03/27/24  0531 03/26/24  0818   PROCALCITONIN ng/ml 0.06 0.07     Results from last 7 days   Lab Units 03/26/24  1119 03/26/24  0855   LACTIC ACID mmol/L 1.3 2.2*     Results from last 7 days   Lab Units 03/26/24  0822 03/22/24  1022   CLARITY UA  Clear Cloudy   COLOR UA  Light Yellow Yellow   SPEC GRAV UA  1.018 1.010   PH UA  7.5 5.5   GLUCOSE UA mg/dl >=1000 (1%)* >=1000 (1%)*   KETONES UA mg/dl 20 (1+)* Negative   BLOOD UA  Negative Moderate*   PROTEIN UA mg/dl Negative Negative   NITRITE UA  Negative Positive*   BILIRUBIN UA  Negative Negative   UROBILINOGEN UA E.U./dl  --  0.2   UROBILINOGEN UA (BE) mg/dl <2.0  --    LEUKOCYTES UA  Moderate* Small*   WBC UA /hpf Innumerable* Innumerable*   RBC UA /hpf 4-10* Innumerable*   BACTERIA UA /hpf None Seen Moderate*   EPITHELIAL CELLS WET PREP /hpf None Seen None Seen   MUCUS THREADS   --  Moderate*     Results from last 7 days   Lab Units 03/26/24  0923 03/26/24  0855 03/26/24  0822 03/22/24  1022   BLOOD CULTURE  No Growth at 24 hrs. No Growth at 24 hrs.  --   --    URINE CULTURE   --   --  10,000-19,000 cfu/ml Candida tropicalis* 80,000-89,000 cfu/ml Klebsiella pneumoniae*  20,000-29,000 cfu/ml Candida tropicalis*  20,000-29,000 cfu/ml Lactobacillus species*         ED Treatment:   Medication Administration from 03/26/2024 0723 to 03/26/2024 1315         Date/Time Order Dose Route Action     03/26/2024 0826 EDT sodium chloride 0.9 % bolus 1,000 mL 1,000 mL Intravenous New Bag     03/26/2024 0826 EDT fentaNYL injection 50 mcg 50 mcg Intravenous Given     03/26/2024 0905 EDT iohexol (OMNIPAQUE) 350 MG/ML injection  (SINGLE-DOSE) 100 mL 100 mL Intravenous Given     03/26/2024 1027 EDT sodium chloride 0.9 % bolus 1,000 mL 1,000 mL Intravenous New Bag     03/26/2024 1022 EDT ampicillin-sulbactam (UNASYN) 3 g in sodium chloride 0.9 % 100 mL IVPB 3 g Intravenous New Bag          Past Medical History:   Diagnosis Date    Acute laryngitis     Acute nonsuppurative otitis media, unspecified laterality     Arm weakness     Arthritis     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HCC)     Bladder infection     Bronchitis     Constipation     Cough     Diabetes mellitus (HCC)     Dizziness     Dysfunction of eustachian tube     Erectile dysfunction of non-organic origin     Fatigue     Glaucoma     Hiatal hernia     Hypertension     Imbalance     Leg muscle spasm     MS (multiple sclerosis) (HCC)     Nephrolithiasis     Neurogenic bladder     No natural teeth     Sinus pain     Spinal stenosis     Strain of thoracic region     Stroke ([facility])     Suprapubic catheter ([facility])      Present on Admission:   Sepsis ([facility])   Chronic diastolic congestive heart failure ([facility])   Generalized anxiety disorder   Multiple sclerosis ([facility])   Obstructive sleep apnea   Type 2 diabetes mellitus with hyperglycemia, without long-term current use of insulin ([facility])   (Resolved) Urinary retention      Admitting Diagnosis: Cystitis [N30.90]  Groin pain [R10.30]  Chronic suprapubic catheter ([facility]) [Z93.59]  Age/Sex: 73 y.o. male  Admission Orders:  Consistent Carbohydrate Diet.  Blood glucose checks ACHS.  DW. SCDs.    Scheduled Medications:  amLODIPine, 10 mg, Oral, Daily   And  atorvastatin, 80 mg, Oral, Daily  cefTRIAXone, 1,000 mg, Intravenous, Q24H  clopidogrel, 75 mg, Oral, Daily  gabapentin, 300 mg, Oral, BID  gabapentin, 600 mg, Oral, HS  heparin (porcine), 5,000 Units, Subcutaneous, Q8H [facility]  insulin lispro, 1-5 Units, Subcutaneous, TID AC  insulin lispro, 1-5 Units, Subcutaneous, HS  latanoprost, 1 drop, Both Eyes, HS  pantoprazole, 40 mg, Oral, BID AC  polyethylene glycol, 17 g,  Oral, Daily  potassium citrate, 20 mEq, Oral, Daily  propranolol, 60 mg, Oral, Daily  senna-docusate sodium, 2 tablet, Oral, HS  sertraline, 25 mg, Oral, HS    Continuous IV Infusions: none    PRN Meds:  acetaminophen, 650 mg, Oral, Q6H PRN; 3/27 x2  albuterol, 2.5 mg, Nebulization, Q6H PRN  ALPRAZolam, 0.25 mg, Oral, BID PRN  aluminum-magnesium hydroxide-simethicone, 30 mL, Oral, Q6H PRN  bisacodyl, 10 mg, Rectal, Daily PRN  melatonin, 3 mg, Oral, HS PRN  ondansetron, 4 mg, Intravenous, Q6H PRN; 3/26 x1  sodium chloride, 1 spray, Each Nare, Q1H PRN; 3/26 x1        IP CONSULT TO UROLOGY    Network Utilization Review Department  ATTENTION: Please call with any questions or concerns to [phone] and carefully listen to the prompts so that you are directed to the right person. All voicemails are confidential.   For Discharge needs, contact Care Management DC Support Team at [phone] opt. [phone]  Send all requests for admission clinical reviews, approved or denied determinations and any other requests to dedicated fax number below belonging to the campus where the patient is receiving treatment. List of dedicated fax numbers for the Facilities:  FACILITY NAME UR FAX NUMBER   ADMISSION DENIALS (Administrative/Medical Necessity) [phone]   DISCHARGE SUPPORT TEAM (NETWORK) [phone]   PARENT CHILD HEALTH (Maternity/NICU/Pediatrics) [phone]   [facility] [phone]   [facility] [phone]   [facility] [phone]   [facility] [phone]   [facility] [phone]   [facility] [phone]   [facility] [phone]   [facility] [phone]   [facility] [phone]   [facility] [phone]   [facility]  [phone]   [facility] [phone]

## 2024-03-27 NOTE — CONSULTS
Consult - Urology   Mathew Rico 1950, 73 y.o. male MRN: [id number]    Unit/Bed#: E5 -01 Encounter: [id number]      Urinary retention  Assessment & Plan  Neurogenic bladder with chronic urinary retention-managed with indwelling suprapubic catheter for 10+ years  Prior bladder stones- cystolitholapaxy 2014 & 2019. No current stones.  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  I have also confirmed placement is within the bladder looking at his CT scan from yesterday.  There is no notable bladder debris or calculi on that scan.      In general there are no great solutions for mucous plugging or sediment aside from improving oral hydration to keep urine dilute, he will have a goal of  ounces of water per day.  Will also start changing his suprapubic tube every 4 weeks instead of 5.  Can attempt upsizing him to 20 French at his next exchange which is due end of April ([facility]) or else when he is in our office in june for cystoscopy of the cystostomy tract.  Can utilize as needed or daily 60 cc sterile water catheter flushes.  He has polymicrobial bacteriuria, and candiduria could consider the addition of methenamine to reduce bacterial cystitis, he is not generally symptomatic from these cultures though he is frequently treated with antibiotics. I anticipate he will always have positive urine cultures and does not require directected therapy if no acute or focal  symptoms. He has his routine q2 year visit coming up in June, with planned interval cystoscopy at that visit. He is happy with the changes suggested above.      Urology will sign off but remain available for any further inpatient needs. Please feel free to contact the provider currently covering the Urology [facility] role for this campus with questions or concerns.      Subjective: 73-year-old man with neurogenic  "bladder secondary to multiple sclerosis, urinary retention managed with suprapubic catheter for at least 10 years.  2014 he underwent TURP with initial SP tube insertion, he had another cystolitholapaxy for recurrent bladder stones in 2019.  He has been stone free since then.  He is generally managed with routine suprapubic catheter exchanges from visiting nurses every 5 weeks.      Over the last several months this has become more more issues with catheter obstruction, clogging.  Occasionally simply flushing the catheter is helpful to restore urine output, but on several occasions has been seen in the ER for catheter exchanges after hours.  Current catheter was just exchanged this week in a situation like that in our ER.  He was put on macrobid empirically, and is now on ceftriaxone during this hospital stay to cotreat a possible pneumonia. Catheter is currently draining clear yellow urine there is rare sediment in the tubing, no cloudiness or mucus.  Rarely he will have a spasm and passed some urine from his urethra as well he wears a pad just in case.  This does not bother him.  He has not had any hematuria.  He has upcoming every 2 year visit in June with his urologist Dr. Jeffries.    Review of Systems   Constitutional: Negative.    Respiratory: Negative.     Cardiovascular: Negative.    Genitourinary:  Positive for difficulty urinating (spt dependent many years). Negative for decreased urine volume, dysuria, flank pain, frequency, hematuria, testicular pain and urgency.   Musculoskeletal: Negative.        Objective:  Vitals: Blood pressure 148/68, pulse 84, temperature 98.7 °F (37.1 °C), temperature source Oral, resp. rate 18, height 5' 7\" (1.702 m), weight 62.7 kg (138 lb 3.7 oz), SpO2 96%.,Body mass index is 21.65 kg/m².    Physical Exam  Vitals and nursing note reviewed.   Constitutional:       General: He is not in acute distress.     Appearance: Normal appearance. He is not ill-appearing or diaphoretic. "   HENT:      Head: Normocephalic and atraumatic.   Cardiovascular:      Rate and Rhythm: Normal rate and regular rhythm.   Pulmonary:      Effort: Pulmonary effort is normal.      Breath sounds: Normal breath sounds.   Abdominal:      General: There is no distension.      Tenderness: There is no abdominal tenderness. There is no right CVA tenderness or left CVA tenderness.      Comments: 18Fr latex suprapubic catheter in place draining clear yellow urine. few flecks of sediment, noncloudy. Cystostomy site with small granulation tissue at 8-11 o'clock. He says this bleeds every now and then but no major bother.   Genitourinary:     Penis: Normal.       Testes: Normal.   Musculoskeletal:      Right lower leg: No edema.      Left lower leg: No edema.   Skin:     General: Skin is warm and dry.   Neurological:      Mental Status: He is alert and oriented to person, place, and time.   Psychiatric:         Speech: Speech normal.         Behavior: Behavior normal.         Imaging:    CT abdomen pelvis w contrast [[id number]] Collected: 03/26/24 0917   Order Status: Completed Updated: 03/26/24 0955   Narrative:     CT ABDOMEN AND PELVIS WITH IV CONTRAST    INDICATION: abdominal and groin pain.    COMPARISON: February 15, 2024    TECHNIQUE: CT examination of the abdomen and pelvis was performed. Multiplanar 2D reformatted images were created from the source data.    This examination, like all CT scans performed in the [facility], was performed utilizing techniques to minimize radiation dose exposure, including the use of iterative reconstruction and automated exposure control. Radiation dose length  product (DLP) for this visit: 391 mGy-cm    IV Contrast: 100 mL of iohexol (OMNIPAQUE)  Enteric Contrast: Not administered.    FINDINGS:    ABDOMEN    LOWER CHEST: Patchy consolidation in the anterolateral left lower lobe in the left lung base over an area of approximately 2.5 cm, not present in February probably  "representing small patchy focal pneumonia.    LIVER/BILIARY TREE: Unremarkable.    GALLBLADDER: No calcified gallstones. No pericholecystic inflammatory change.    SPLEEN: Unremarkable.    PANCREAS: Unremarkable.    ADRENAL GLANDS: Unremarkable.    KIDNEYS/URETERS: Small bilateral renal cysts. No solid renal mass, hydronephrosis, or urinary tract calculus.    STOMACH AND BOWEL: Thickening of the wall of a 15 cm segment of rectum with surrounding perirectal inflammatory fat stranding and prominence of perirectal vessels.    APPENDIX: No findings to suggest appendicitis.    ABDOMINOPELVIC CAVITY: No ascites. No pneumoperitoneum. No lymphadenopathy.    VESSELS: Atherosclerotic change. No abdominal aortic aneurysm..    PELVIS    REPRODUCTIVE ORGANS: Unremarkable for patient's age.    URINARY BLADDER: Collapsed around a suprapubic catheter balloon. Thick walled with perivesical inflammatory stranding.    ABDOMINAL WALL/INGUINAL REGIONS: Small bilateral fat-containing inguinal hernias.    BONES: No acute fracture or suspicious osseous lesion. Bilateral hip osteoarthritis.   Impression:       Cystitis despite the presence of suprapubic catheter with perivesical stranding slightly more pronounced when compared with February 15, 2024.    Proctitis with perirectal fat stranding, also similar from February 15, 2024.    Small patchy consolidation in the anterolateral left lower lobe suspicious for small area of focal pneumonia. Low radiation dose noncontrast chest CT follow-up in 3 to 6 months recommended to document interval resolution.    This examination was marked \"immediate notification\" in Epic in order to begin the standard process by which the radiology reading room liaison alerts the referring practitioner.     Imaging reviewed - both report and images personally reviewed.     Labs:  Recent Labs     03/26/24  0818 03/27/24  0531   WBC 14.12* 9.17     Recent Labs     03/26/24  0818 03/27/24  0531   HGB 15.2 12.5 "       Recent Labs     24  0818 24  0531   CREATININE 0.77 0.69       Microbiology:  2024 1022 2024 1334 Urine culture [[id number]]    (Abnormal)   Urine, Suprapubic catheter    Final result [facility]  Component Value   Urine Culture 80,000-89,000 cfu/ml Klebsiella pneumoniae Abnormal     20,000-29,000 cfu/ml Candida tropicalis Abnormal     20,000-29,000 cfu/ml Lactobacillus species Abnormal        Susceptibility    Klebsiella pneumoniae (1)    Antibiotic Interpretation Microscan  Method Status    ZID Performed  Yes  LIYA Final    Amoxicillin + Clavulanate Susceptible <=8/4 ug/ml LIYA Final    Ampicillin ($$) Resistant <=8.00 ug/ml LIYA Final    Ampicillin + Sulbactam ($) Susceptible <=4/2 ug/ml LIYA Final    Aztreonam ($$$) Susceptible <=4 ug/ml LIYA Final    Cefazolin ($) Susceptible <=2.00 ug/ml LIYA Final    Ciprofloxacin ($) Resistant 1.00 ug/ml LIYA Final    Gentamicin ($$) Susceptible <=2 ug/ml LIYA Final    Levofloxacin ($) Intermediate 1.00 ug/ml LIYA Final    Nitrofurantoin Susceptible <=32 ug/ml LIYA Final    Tetracycline Susceptible <=4 ug/ml LIYA Final    Tobramycin ($) Susceptible <=2 ug/ml LIYA Final    Trimethoprim + Sulfamethoxazole ($$$) Susceptible <=0.5/9.5 ug/ml LIYA Final    Candida tropicalis (2)    Antibiotic Interpretation Microscan  Method Status    ZID Performed  Yes  LIYA Final              History:  Social History     Socioeconomic History    Marital status: Single     Spouse name: None    Number of children: None    Years of education: None    Highest education level: None   Occupational History    Occupation:    retired   Tobacco Use    Smoking status: Former     Current packs/day: 0.00     Average packs/day: 0.5 packs/day for 31.0 years (15.5 ttl pk-yrs)     Types: Cigarettes     Start date:      Quit date:      Years since quittin.2    Smokeless tobacco: Never   Vaping Use    Vaping status: Never Used   Substance and  Sexual Activity    Alcohol use: Not Currently    Drug use: No    Sexual activity: Never   Other Topics Concern    None   Social History Narrative    None     Social Determinants of Health     Financial Resource Strain: Not on file   Food Insecurity: No Food Insecurity (3/27/2024)    Hunger Vital Sign     Worried About Running Out of Food in the Last Year: Never true     Ran Out of Food in the Last Year: Never true   Transportation Needs: No Transportation Needs (3/27/2024)    PRAPARE - Transportation     Lack of Transportation (Medical): No     Lack of Transportation (Non-Medical): No   Physical Activity: Not on file   Stress: Not on file   Social Connections: Not on file   Intimate Partner Violence: Not on file   Housing Stability: Low Risk  (3/27/2024)    Housing Stability Vital Sign     Unable to Pay for Housing in the Last Year: No     Number of Places Lived in the Last Year: 1     Unstable Housing in the Last Year: No       Past Medical History:   Diagnosis Date    Acute laryngitis     Acute nonsuppurative otitis media, unspecified laterality     Arm weakness     Arthritis     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[facility])     Bladder infection     Bronchitis     Constipation     Cough     Diabetes mellitus ([facility])     Dizziness     Dysfunction of eustachian tube     Erectile dysfunction of non-organic origin     Fatigue     Glaucoma     Hiatal hernia     Hypertension     Imbalance     Leg muscle spasm     MS (multiple sclerosis) ([facility])     Nephrolithiasis     Neurogenic bladder     No natural teeth     Sinus pain     Spinal stenosis     Strain of thoracic region     Stroke ([facility])     Suprapubic catheter ([facility])      Past Surgical History:   Procedure Laterality Date    APPENDECTOMY      BRAIN SURGERY      Coil placed in aneurysm    CYSTOSCOPY      CYSTOSCOPY      CYSTOSCOPY  06/11/2018    CYSTOSCOPY  01/15/2021    EYE SURGERY      transscleral cyclophotocoagulation noncontact YAG laser    MYRINGOTOMY      with ventilation tube  insertion    FL LITHOLAPAXY SMPL/SM <2.5 CM N/A 5/7/2019    Procedure: CYSTOSCOPY, holmium laser litholapaxy of bladder stones, EXCHANGE OF SP TUBE;  Surgeon: Javy Jeffries MD;  Location: [facility];  Service: Urology    SUPRAPUBIC CATHETER INSERTION       Family History   Problem Relation Age of Onset    Heart attack Mother     Stroke Mother     Heart attack Father     Anuerysm Father         In Stomach        Brianna Ramirez PA-C  Date: 3/27/2024 Time: 1:28 PM

## 2024-03-27 NOTE — ASSESSMENT & PLAN NOTE
Neurogenic bladder with chronic urinary retention-managed with indwelling suprapubic catheter for 10+ years  Prior bladder stones- cystolitholapaxy 2014 & 2019. No current stones.  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  I have also confirmed placement is within the bladder looking at his CT scan from yesterday.  There is no notable bladder debris or calculi on that scan.      In general there are no great solutions for mucous plugging or sediment aside from improving oral hydration to keep urine dilute, he will have a goal of  ounces of water per day.  Will also start changing his suprapubic tube every 4 weeks instead of 5.  Can attempt upsizing him to 20 French at his next exchange which is due end of April ([facility]) or else when he is in our office in june for cystoscopy of the cystostomy tract.  Can utilize as needed or daily 60 cc sterile water catheter flushes.  He has polymicrobial bacteriuria, and candiduria could consider the addition of methenamine to reduce bacterial cystitis, he is not generally symptomatic from these cultures though he is frequently treated with antibiotics. I anticipate he will always have positive urine cultures and does not require directected therapy if no acute or focal  symptoms. He has his routine q2 year visit coming up in June, with planned interval cystoscopy at that visit. He is happy with the changes suggested above.

## 2024-03-27 NOTE — PLAN OF CARE
Problem: Potential for Falls  Goal: Patient will remain free of falls  Description: INTERVENTIONS:  - Educate patient/family on patient safety including physical limitations  - Instruct patient to call for assistance with activity   - Consult OT/PT to assist with strengthening/mobility   - Keep Call bell within reach  - Keep bed low and locked with side rails adjusted as appropriate  - Keep care items and personal belongings within reach  - Initiate and maintain comfort rounds  - Make Fall Risk Sign visible to staff  - Offer Toileting every 2 Hours, in advance of need  - Initiate/Maintain bed alarm  - Obtain necessary fall risk management equipment: bed alarm  - Apply yellow socks and bracelet for high fall risk patients  - Consider moving patient to room near nurses station  Outcome: Progressing     Problem: PAIN - ADULT  Goal: Verbalizes/displays adequate comfort level or baseline comfort level  Description: Interventions:  - Encourage patient to monitor pain and request assistance  - Assess pain using appropriate pain scale  - Administer analgesics based on type and severity of pain and evaluate response  - Implement non-pharmacological measures as appropriate and evaluate response  - Consider cultural and social influences on pain and pain management  - Notify physician/advanced practitioner if interventions unsuccessful or patient reports new pain  Outcome: Progressing     Problem: INFECTION - ADULT  Goal: Absence or prevention of progression during hospitalization  Description: INTERVENTIONS:  - Assess and monitor for signs and symptoms of infection  - Monitor lab/diagnostic results  - Monitor all insertion sites, i.e. indwelling lines, tubes, and drains  - Monitor endotracheal if appropriate and nasal secretions for changes in amount and color  - [place] appropriate cooling/warming therapies per order  - Administer medications as ordered  - Instruct and encourage patient and family to use good hand  hygiene technique  - Identify and instruct in appropriate isolation precautions for identified infection/condition  Outcome: Progressing     Problem: SAFETY ADULT  Goal: Patient will remain free of falls  Description: INTERVENTIONS:  - Educate patient/family on patient safety including physical limitations  - Instruct patient to call for assistance with activity   - Consult OT/PT to assist with strengthening/mobility   - Keep Call bell within reach  - Keep bed low and locked with side rails adjusted as appropriate  - Keep care items and personal belongings within reach  - Initiate and maintain comfort rounds  - Make Fall Risk Sign visible to staff  - Offer Toileting every 2 Hours, in advance of need  - Initiate/Maintain bed alarm  - Obtain necessary fall risk management equipment: bed alarm  - Apply yellow socks and bracelet for high fall risk patients  - Consider moving patient to room near nurses station  Outcome: Progressing  Goal: Maintain or return to baseline ADL function  Description: INTERVENTIONS:  -  Assess patient's ability to carry out ADLs; assess patient's baseline for ADL function and identify physical deficits which impact ability to perform ADLs (bathing, care of mouth/teeth, toileting, grooming, dressing, etc.)  - Assess/evaluate cause of self-care deficits   - Assess range of motion  - Assess patient's mobility; develop plan if impaired  - Assess patient's need for assistive devices and provide as appropriate  - Encourage maximum independence but intervene and supervise when necessary  - Involve family in performance of ADLs  - Assess for home care needs following discharge   - Consider OT consult to assist with ADL evaluation and planning for discharge  - Provide patient education as appropriate  Outcome: Progressing  Goal: Maintains/Returns to pre admission functional level  Description: INTERVENTIONS:  - Perform AM-PAC 6 Click Basic Mobility/ Daily Activity assessment daily.  - Set and  communicate daily mobility goal to care team and patient/family/caregiver.   - Collaborate with rehabilitation services on mobility goals if consulted  - Perform Range of Motion 3 times a day.  - Reposition patient every 2 hours.  - Dangle patient 3 times a day  - Stand patient 3 times a day  - Ambulate patient 3 times a day  - Out of bed to chair 3 times a day   - Out of bed for meals 3 times a day  - Out of bed for toileting  - Record patient progress and toleration of activity level   Outcome: Progressing     Problem: DISCHARGE PLANNING  Goal: Discharge to home or other facility with appropriate resources  Description: INTERVENTIONS:  - Identify barriers to discharge w/patient and caregiver  - Arrange for needed discharge resources and transportation as appropriate  - Identify discharge learning needs (meds, wound care, etc.)  - Arrange for interpretive services to assist at discharge as needed  - Refer to Case Management Department for coordinating discharge planning if the patient needs post-hospital services based on physician/advanced practitioner order or complex needs related to functional status, cognitive ability, or social support system  Outcome: Progressing     Problem: Knowledge Deficit  Goal: Patient/family/caregiver demonstrates understanding of disease process, treatment plan, medications, and discharge instructions  Description: Complete learning assessment and assess knowledge base.  Interventions:  - Provide teaching at level of understanding  - Provide teaching via preferred learning methods  Outcome: Progressing     Problem: Prexisting or High Potential for Compromised Skin Integrity  Goal: Skin integrity is maintained or improved  Description: INTERVENTIONS:  - Identify patients at risk for skin breakdown  - Assess and monitor skin integrity  - Assess and monitor nutrition and hydration status  - Monitor labs   - Assess for incontinence   - Turn and reposition patient  - Assist with  mobility/ambulation  - Relieve pressure over bony prominences  - Avoid friction and shearing  - Provide appropriate hygiene as needed including keeping skin clean and dry  - Evaluate need for skin moisturizer/barrier cream  - Collaborate with interdisciplinary team   - Patient/family teaching  - Consider wound care consult   Outcome: Progressing

## 2024-03-27 NOTE — PLAN OF CARE
Problem: PHYSICAL THERAPY ADULT  Goal: Performs mobility at highest level of function for planned discharge setting.  See evaluation for individualized goals.  Description: Treatment/Interventions: Functional transfer training, LE strengthening/ROM, Therapeutic exercise, Endurance training, Patient/family training, Bed mobility, Gait training, Spoke to nursing, OT          See flowsheet documentation for full assessment, interventions and recommendations.  Note: Prognosis: Fair  Problem List: Decreased strength, Decreased range of motion, Decreased endurance, Impaired balance, Decreased mobility, Decreased cognition, Impaired judgement, Decreased safety awareness, Pain  Assessment: Pt. 73 y.o.male presents with groin and abdominal pain. Past medical hx includes CHF, MS, suprapubic catheter. Pt admitted for Sepsis (HCC) w/ Cystitis (N30.90)  Groin pain (R10.30)  Chronic suprapubic catheter (HCC) (Z93.59). Pt referred to PT for functional mobility evaluation & D/C planning. Cleared by Dr. Talley for OOB mobility. Pain 7/10 in R leg and groin. PTA, pt reports being (+) hx of falls  and needing assistance with ADLs, and IADLs, and mod I for wheelchair propulsion. Able to stand pivot independently from bed<>wheelchair. During evaluation, deficits included dec mobility, balance, ambulation. Required maxAx2 for supine to sit, sit<>stand and standing balance. Required mod<>maxA of sitting trunk balance throughout session at EOB. Significant cues to maintain hand placement on bilateral bed rails to assist in trunk support. Upon standing, Inc cues to inc trunk extension and bilateral knee extension. Attempted to advance LE however pt unable to clear level tile surface with both verbal and tactile cues. Use of quick move for OOB. See treatment assessment for further functional mobility. Pt demonstrated dec endurance and tolerance to activity. Mild dizziness throughout session however vitals stable. See flowsheet for BP values.  Pt was educated on fall precautions and reinforced w/ good understanding. Pt would benefit from continued PT to address deficits as defined above and maximize level of independence with functional mobility and safety. Based on pt presentation and impaired function, pt would benefit from level II, (moderate resource intensity) at D/C.   The patient's AM-PAC Basic Mobility Inpatient Short Form Raw Score is 10. A Raw score of less than or equal to 16 suggests the patient may benefit from discharge to post-acute rehabilitation services. Please also refer to the recommendation of the Physical Therapist for safe discharge planning. CM to follow. Co-eval performed to complete this PT evaluation for the pts best interest given pts medical complexity and functional level.  Barriers to Discharge: Decreased caregiver support  Barriers to Discharge Comments: ?level of support at home  Rehab Resource Intensity Level, PT: II (Moderate Resource Intensity)    See flowsheet documentation for full assessment.

## 2024-03-27 NOTE — PLAN OF CARE
Problem: OCCUPATIONAL THERAPY ADULT  Goal: Performs self-care activities at highest level of function for planned discharge setting.  See evaluation for individualized goals.  Description: Treatment Interventions: ADL retraining, Functional transfer training, UE strengthening/ROM, Endurance training, Cognitive reorientation, Patient/family training, Equipment evaluation/education, Compensatory technique education, Continued evaluation, Energy conservation          See flowsheet documentation for full assessment, interventions and recommendations.   Note: Limitation: Decreased ADL status, Decreased UE strength, Decreased Safe judgement during ADL, Decreased endurance, Decreased high-level ADLs  Prognosis: Fair  Assessment: Pt is a 73 y.o male who presented to the ED on 3/26/2024 with groin pain and dizziness. Pt with multiple recent hospitalizations (2/14/24-2/20-24 for UTI). Pt with PMH of MS, suprapubic catheter, arthritis, basilar artery aneurysm, bladder infection, dizziness, constipation, bronchitis, glaucoma, hiatal hernia, htn, leg muscle spasms, neurogenic bladder, stroke, spinal stenosis, and nephrolithiasis. Pt states PTA he required assistance with ADL/IADLs, transfers, and functional mobility - with use of manual w/c. Pt reports performing stand-pivot transfers to w/c and able to propel w/c independently; per chart review non-ambulatory @ baseline. Pt questionable historian. Pt reports (+) fall =1x?, (-) , (?) home alone. During OT initial evaluation pt demonstrated defictis with ADL status, cognition, transfer safety, b/l UE strength, functional balance, activity tolerance (currently fair =15-20mins), and functional mobility. Pt demonstrates the need for IP OT services at this time for the above stated deficits. 3-5x/1-2weeks. The patient's raw score on the AM-PAC Daily Activity Inpatient Short Form is 17. A raw score of less than 19 suggests the patient may benefit from discharge to post-acute  rehabilitation services. Please refer to the recommendation of the Occupational Therapist for safe discharge planning.     Rehab Resource Intensity Level, OT: II (Moderate Resource Intensity)

## 2024-03-27 NOTE — OCCUPATIONAL THERAPY NOTE
Occupational Therapy Evaluation     Patient Name: Mathew Rico  Today's Date: 3/27/2024  Problem List  Principal Problem:    Sepsis (HCC)  Active Problems:    Chronic suprapubic catheter (HCC)    Generalized anxiety disorder    Multiple sclerosis (HCC)    Obstructive sleep apnea    Type 2 diabetes mellitus with hyperglycemia, without long-term current use of insulin (HCC)    Urinary retention    Chronic diastolic congestive heart failure (HCC)    Past Medical History  Past Medical History:   Diagnosis Date    Acute laryngitis     Acute nonsuppurative otitis media, unspecified laterality     Arm weakness     Arthritis     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HCC)     Bladder infection     Bronchitis     Constipation     Cough     Diabetes mellitus (HCC)     Dizziness     Dysfunction of eustachian tube     Erectile dysfunction of non-organic origin     Fatigue     Glaucoma     Hiatal hernia     Hypertension     Imbalance     Leg muscle spasm     MS (multiple sclerosis) (HCC)     Nephrolithiasis     Neurogenic bladder     No natural teeth     Sinus pain     Spinal stenosis     Strain of thoracic region     Stroke (HCC)     Suprapubic catheter (HCC)      Past Surgical History  Past Surgical History:   Procedure Laterality Date    APPENDECTOMY      BRAIN SURGERY      Coil placed in aneurysm    CYSTOSCOPY      CYSTOSCOPY      CYSTOSCOPY  06/11/2018    CYSTOSCOPY  01/15/2021    EYE SURGERY      transscleral cyclophotocoagulation noncontact YAG laser    MYRINGOTOMY      with ventilation tube insertion    NC LITHOLAPAXY SMPL/SM <2.5 CM N/A 5/7/2019    Procedure: CYSTOSCOPY, holmium laser litholapaxy of bladder stones, EXCHANGE OF SP TUBE;  Surgeon: Javy Jeffries MD;  Location: [facility];  Service: Urology    SUPRAPUBIC CATHETER INSERTION             03/27/24 1328   OT Last Visit   OT Visit Date 03/27/24   Note Type   Note type Evaluation   Pain Assessment   Pain Assessment Tool 0-10   Pain Score 7   Pain Location/Orientation  Orientation: Right;Location: Groin   Pain Rating: FLACC (Rest) - Face 0   Pain Rating: FLACC (Rest) - Legs 0   Pain Rating: FLACC (Rest) - Activity 0   Pain Rating: FLACC (Rest) - Cry 0   Pain Rating: FLACC (Rest) - Consolability 0   Score: FLACC (Rest) 0   Pain Rating: FLACC (Activity) - Face 0   Pain Rating: FLACC (Activity) - Legs 0   Pain Rating: FLACC (Activity) - Activity 0   Pain Rating: FLACC (Activity) - Cry 0   Pain Rating: FLACC (Activity) - Consolability 0   Score: FLACC (Activity) 0   Restrictions/Precautions   Weight Bearing Precautions Per Order No   Other Precautions Cognitive;Chair Alarm;Bed Alarm;Fall Risk;Pain;Contact/isolation   Home Living   Type of Home House   Home Layout One level;Performs ADLs on one level;Able to live on main level with bedroom/bathroom  (pt reports he had a ramped entrance but it was taken out; 1 big DAI)   Bathroom Shower/Tub Walk-in shower   Bathroom Toilet Standard   Bathroom Equipment Grab bars in shower;Shower chair;Grab bars around toilet;Commode   Bathroom Accessibility Accessible   Home Equipment Walker;Cane;Hospital bed;Wheelchair-manual;Crutches   Prior Function   Level of [place] Needs assistance with ADLs;Modified independent with wheelchair;Needs assistance with IADLS;Needs assistance with functional mobility   Lives With Family   Receives Help From Family   IADLs Family/Friend/Other provides transportation;Family/Friend/Other provides meals;Family/Friend/Other provides medication management   Falls in the last 6 months 1 to 4  (1x?)   Vocational Retired   Lifestyle   Autonomy Pt states PTA he required assistance with ADL/IADLs, transfers, and functional mobility - with use of manual w/c. Pt reports performing stand-pivot transfers to w/c and able to propel w/c independently; per chart review non-ambulatory @ baseline. Pt questionable historian. Pt reports (+) fall =1x?, (-) , (?) home alone.   Reciprocal Relationships brother and sister   Service  "to Others homemaker   Intrinsic Gratification watching tv   Subjective   Subjective \"I wanna get to the chair.\"   ADL   Where Assessed Edge of bed   Eating Assistance 5  Supervision/Setup   Grooming Assistance 5  Supervision/Setup   UB Bathing Assistance 4  Minimal Assistance   LB Bathing Assistance 2  Maximal Assistance   UB Dressing Assistance 4  Minimal Assistance   LB Dressing Assistance 2  Maximal Assistance   Toileting Assistance  2  Maximal Assistance   Bed Mobility   Supine to Sit 2  Maximal assistance   Additional items Assist x 2;Increased time required;Verbal cues;LE management   Sit to Supine 2  Maximal assistance   Additional items Assist x 2;Increased time required;LE management;Verbal cues   Transfers   Sit to Stand 2  Maximal assistance   Additional items Assist x 2;Bedrails;Increased time required;Verbal cues   Stand to Sit 2  Maximal assistance   Additional items Assist x 2;Increased time required;Verbal cues;Armrests   Additional Comments Bps: 151/87 (EOB), 123/64 (EOB after standing), 133/61 (in chair after transfer)   Functional Mobility   Functional Mobility   (Reccomend quick move with nsg for OOB)   Balance   Static Sitting Fair -   Dynamic Sitting Poor +   Static Standing Poor   Dynamic Standing Poor -   Activity Tolerance   Activity Tolerance Patient limited by fatigue;Patient tolerated treatment well   Medical Staff Made Aware nsg, PT Zaida   RUE Assessment   RUE Assessment WFL   RUE Strength   RUE Overall Strength Within Functional Limits - able to perform ADL tasks with strength  (4/5 throughout)   LUE Assessment   LUE Assessment WFL   LUE Strength   LUE Overall Strength Within Functional Limits - able to perform ADL tasks with strength  (4/5 throughout)   Hand Function   Gross Motor Coordination Functional   Fine Motor Coordination Functional   Sensation   Light Touch No apparent deficits   Proprioception   Proprioception No apparent deficits   Vision-Basic Assessment   Current Vision " Other (Comment)  (glasses)   Visual History Glaucoma   Vision - Complex Assessment   Acuity   (able to see how many finger therapists held up)   Psychosocial   Psychosocial (WDL) WDL   Perception   Inattention/Neglect Appears intact   Cognition   Overall Cognitive Status Impaired   Arousal/Participation Alert;Responsive;Cooperative   Attention Attends with cues to redirect   Orientation Level Oriented to person;Oriented to place;Oriented to situation;Disoriented to time   Memory Decreased recall of precautions;Decreased recall of recent events;Decreased short term memory   Following Commands Follows one step commands with increased time or repetition   Assessment   Limitation Decreased ADL status;Decreased UE strength;Decreased Safe judgement during ADL;Decreased endurance;Decreased high-level ADLs   Prognosis Fair   Assessment Pt is a 73 y.o male who presented to the ED on 3/26/2024 with groin pain and dizziness. Pt with multiple recent hospitalizations (2/14/24-2/20-24 for UTI). Pt with PMH of MS, suprapubic catheter, arthritis, basilar artery aneurysm, bladder infection, dizziness, constipation, bronchitis, glaucoma, hiatal hernia, htn, leg muscle spasms, neurogenic bladder, stroke, spinal stenosis, and nephrolithiasis. Pt states PTA he required assistance with ADL/IADLs, transfers, and functional mobility - with use of manual w/c. Pt reports performing stand-pivot transfers to w/c and able to propel w/c independently; per chart review non-ambulatory @ baseline. Pt questionable historian. Pt reports (+) fall =1x?, (-) , (?) home alone. During OT initial evaluation pt demonstrated defictis with ADL status, cognition, transfer safety, b/l UE strength, functional balance, activity tolerance (currently fair =15-20mins), and functional mobility. Pt demonstrates the need for IP OT services at this time for the above stated deficits. 3-5x/1-2weeks. The patient's raw score on the AM-PAC Daily Activity Inpatient  Short Form is 17. A raw score of less than 19 suggests the patient may benefit from discharge to post-acute rehabilitation services. Please refer to the recommendation of the Occupational Therapist for safe discharge planning.   Goals   Patient Goals To go home   STG Time Frame   (1-7 days)   Short Term Goal #1 Pt will tolerate continued cognitive evalaution and appropriate goals and d/c needs will be determined.   Short Term Goal #2 Pt will identify and verbalize 3 potential fall risks and the appropriate compensation technique for increased safety awareness.   Short Term Goal  Pt will improve balance by 1/2 grade to facilitate completion of ADL tasks.   LTG Time Frame   (7-14 days)   Long Term Goal #1 Pt will demonstrate dressing/bathing of UB at supervision and LB at [place] for improved ADL status.   Long Term Goal #2 Pt will demonstrate all transfers (i.e stand pivot, sliding board) at modA level for increased independence with ADL routine.   Long Term Goal Pt will increase activity tolerance to good (20-30mins) and standing tolerance to 2-3mins for ADL routine.   Plan   Treatment Interventions ADL retraining;Functional transfer training;UE strengthening/ROM;Endurance training;Cognitive reorientation;Patient/family training;Equipment evaluation/education;Compensatory technique education;Continued evaluation;Energy conservation   Goal Expiration Date 04/10/24   OT Treatment Day 0   OT Frequency 3-5x/wk   Discharge Recommendation   Rehab Resource Intensity Level, OT II (Moderate Resource Intensity)   AM-PAC Daily Activity Inpatient   Lower Body Dressing 2   Bathing 2   Toileting 3   Upper Body Dressing 3   Grooming 3   Eating 4   Daily Activity Raw Score 17   Daily Activity Standardized Score (Calc for Raw Score >=11) 37.26   AM-PAC Applied Cognition Inpatient   Following a Speech/Presentation 3   Understanding Ordinary Conversation 3   Taking Medications 3   Remembering Where Things Are Placed or Put Away 2    Remembering List of 4-5 Errands 2   Taking Care of Complicated Tasks 2   Applied Cognition Raw Score 15   Applied Cognition Standardized Score 33.54   Elisa Child

## 2024-03-28 ENCOUNTER — APPOINTMENT (INPATIENT)
Dept: RADIOLOGY | Facility: HOSPITAL | Age: 74
DRG: 698 | End: 2024-03-28
Payer: MEDICARE

## 2024-03-28 PROBLEM — R53.1 GENERALIZED WEAKNESS: Status: ACTIVE | Noted: 2024-03-28

## 2024-03-28 LAB
ANION GAP SERPL CALCULATED.3IONS-SCNC: 7 MMOL/L (ref 4–13)
BUN SERPL-MCNC: 9 MG/DL (ref 5–25)
CALCIUM SERPL-MCNC: 9.1 MG/DL (ref 8.4–10.2)
CHLORIDE SERPL-SCNC: 105 MMOL/L (ref 96–108)
CO2 SERPL-SCNC: 24 MMOL/L (ref 21–32)
CREAT SERPL-MCNC: 0.68 MG/DL (ref 0.6–1.3)
ERYTHROCYTE [DISTWIDTH] IN BLOOD BY AUTOMATED COUNT: 14 % (ref 11.6–15.1)
GFR SERPL CREATININE-BSD FRML MDRD: 94 ML/MIN/1.73SQ M
GLUCOSE SERPL-MCNC: 126 MG/DL (ref 65–140)
GLUCOSE SERPL-MCNC: 129 MG/DL (ref 65–140)
GLUCOSE SERPL-MCNC: 152 MG/DL (ref 65–140)
GLUCOSE SERPL-MCNC: 168 MG/DL (ref 65–140)
GLUCOSE SERPL-MCNC: 178 MG/DL (ref 65–140)
GLUCOSE SERPL-MCNC: 180 MG/DL (ref 65–140)
HCT VFR BLD AUTO: 37.8 % (ref 36.5–49.3)
HGB BLD-MCNC: 12.4 G/DL (ref 12–17)
MCH RBC QN AUTO: 29.7 PG (ref 26.8–34.3)
MCHC RBC AUTO-ENTMCNC: 32.8 G/DL (ref 31.4–37.4)
MCV RBC AUTO: 90 FL (ref 82–98)
PLATELET # BLD AUTO: 407 THOUSANDS/UL (ref 149–390)
PMV BLD AUTO: 8.2 FL (ref 8.9–12.7)
POTASSIUM SERPL-SCNC: 3.6 MMOL/L (ref 3.5–5.3)
RBC # BLD AUTO: 4.18 MILLION/UL (ref 3.88–5.62)
SODIUM SERPL-SCNC: 136 MMOL/L (ref 135–147)
WBC # BLD AUTO: 9.64 THOUSAND/UL (ref 4.31–10.16)

## 2024-03-28 PROCEDURE — 82948 REAGENT STRIP/BLOOD GLUCOSE: CPT

## 2024-03-28 PROCEDURE — 80048 BASIC METABOLIC PNL TOTAL CA: CPT | Performed by: INTERNAL MEDICINE

## 2024-03-28 PROCEDURE — 85027 COMPLETE CBC AUTOMATED: CPT | Performed by: INTERNAL MEDICINE

## 2024-03-28 PROCEDURE — NC001 PR NO CHARGE: Performed by: NURSE PRACTITIONER

## 2024-03-28 PROCEDURE — 0T2BX0Z CHANGE DRAINAGE DEVICE IN BLADDER, EXTERNAL APPROACH: ICD-10-PCS | Performed by: STUDENT IN AN ORGANIZED HEALTH CARE EDUCATION/TRAINING PROGRAM

## 2024-03-28 PROCEDURE — C1769 GUIDE WIRE: HCPCS

## 2024-03-28 PROCEDURE — 99232 SBSQ HOSP IP/OBS MODERATE 35: CPT | Performed by: INTERNAL MEDICINE

## 2024-03-28 PROCEDURE — 74430 CONTRAST X-RAY BLADDER: CPT

## 2024-03-28 PROCEDURE — 51705 CHANGE OF BLADDER TUBE: CPT | Performed by: STUDENT IN AN ORGANIZED HEALTH CARE EDUCATION/TRAINING PROGRAM

## 2024-03-28 PROCEDURE — 75984 XRAY CONTROL CATHETER CHANGE: CPT | Performed by: STUDENT IN AN ORGANIZED HEALTH CARE EDUCATION/TRAINING PROGRAM

## 2024-03-28 RX ORDER — CEPHALEXIN 500 MG/1
500 CAPSULE ORAL EVERY 6 HOURS SCHEDULED
Qty: 16 CAPSULE | Refills: 0 | Status: SHIPPED | OUTPATIENT
Start: 2024-03-28 | End: 2024-04-01

## 2024-03-28 RX ADMIN — INSULIN LISPRO 1 UNITS: 100 INJECTION, SOLUTION INTRAVENOUS; SUBCUTANEOUS at 22:22

## 2024-03-28 RX ADMIN — ACETAMINOPHEN 325MG 650 MG: 325 TABLET ORAL at 22:27

## 2024-03-28 RX ADMIN — IOHEXOL 12 ML: 350 INJECTION, SOLUTION INTRAVENOUS at 16:15

## 2024-03-28 RX ADMIN — SERTRALINE HYDROCHLORIDE 25 MG: 25 TABLET ORAL at 22:27

## 2024-03-28 RX ADMIN — LATANOPROST 1 DROP: 50 SOLUTION OPHTHALMIC at 22:23

## 2024-03-28 RX ADMIN — CLOPIDOGREL BISULFATE 75 MG: 75 TABLET ORAL at 08:59

## 2024-03-28 RX ADMIN — PANTOPRAZOLE SODIUM 40 MG: 40 TABLET, DELAYED RELEASE ORAL at 16:53

## 2024-03-28 RX ADMIN — ATORVASTATIN CALCIUM 80 MG: 80 TABLET, FILM COATED ORAL at 09:01

## 2024-03-28 RX ADMIN — INSULIN LISPRO 1 UNITS: 100 INJECTION, SOLUTION INTRAVENOUS; SUBCUTANEOUS at 16:53

## 2024-03-28 RX ADMIN — HEPARIN SODIUM 5000 UNITS: 5000 INJECTION INTRAVENOUS; SUBCUTANEOUS at 13:26

## 2024-03-28 RX ADMIN — AMLODIPINE BESYLATE 10 MG: 10 TABLET ORAL at 09:01

## 2024-03-28 RX ADMIN — HEPARIN SODIUM 5000 UNITS: 5000 INJECTION INTRAVENOUS; SUBCUTANEOUS at 22:21

## 2024-03-28 RX ADMIN — CEFTRIAXONE 1000 MG: 1 INJECTION, SOLUTION INTRAVENOUS at 13:27

## 2024-03-28 RX ADMIN — PANTOPRAZOLE SODIUM 40 MG: 40 TABLET, DELAYED RELEASE ORAL at 05:01

## 2024-03-28 RX ADMIN — POTASSIUM CITRATE 20 MEQ: 10 TABLET, EXTENDED RELEASE ORAL at 09:01

## 2024-03-28 RX ADMIN — GABAPENTIN 300 MG: 300 CAPSULE ORAL at 08:59

## 2024-03-28 RX ADMIN — GABAPENTIN 300 MG: 300 CAPSULE ORAL at 17:10

## 2024-03-28 RX ADMIN — HEPARIN SODIUM 5000 UNITS: 5000 INJECTION INTRAVENOUS; SUBCUTANEOUS at 05:01

## 2024-03-28 RX ADMIN — PROPRANOLOL HYDROCHLORIDE 60 MG: 60 CAPSULE, EXTENDED RELEASE ORAL at 09:01

## 2024-03-28 RX ADMIN — INSULIN LISPRO 1 UNITS: 100 INJECTION, SOLUTION INTRAVENOUS; SUBCUTANEOUS at 11:25

## 2024-03-28 RX ADMIN — GABAPENTIN 600 MG: 300 CAPSULE ORAL at 22:27

## 2024-03-28 NOTE — PLAN OF CARE
Problem: Potential for Falls  Goal: Patient will remain free of falls  Description: INTERVENTIONS:  - Educate patient/family on patient safety including physical limitations  - Instruct patient to call for assistance with activity   - Consult OT/PT to assist with strengthening/mobility   - Keep Call bell within reach  - Keep bed low and locked with side rails adjusted as appropriate  - Keep care items and personal belongings within reach  - Initiate and maintain comfort rounds  - Make Fall Risk Sign visible to staff  - Offer Toileting every 2 Hours, in advance of need  - Initiate/Maintain bed alarm  - Obtain necessary fall risk management equipment: bed alarm  - Apply yellow socks and bracelet for high fall risk patients  - Consider moving patient to room near nurses station  Outcome: Progressing     Problem: PAIN - ADULT  Goal: Verbalizes/displays adequate comfort level or baseline comfort level  Description: Interventions:  - Encourage patient to monitor pain and request assistance  - Assess pain using appropriate pain scale  - Administer analgesics based on type and severity of pain and evaluate response  - Implement non-pharmacological measures as appropriate and evaluate response  - Consider cultural and social influences on pain and pain management  - Notify physician/advanced practitioner if interventions unsuccessful or patient reports new pain  Outcome: Progressing     Problem: INFECTION - ADULT  Goal: Absence or prevention of progression during hospitalization  Description: INTERVENTIONS:  - Assess and monitor for signs and symptoms of infection  - Monitor lab/diagnostic results  - Monitor all insertion sites, i.e. indwelling lines, tubes, and drains  - Monitor endotracheal if appropriate and nasal secretions for changes in amount and color  - [place] appropriate cooling/warming therapies per order  - Administer medications as ordered  - Instruct and encourage patient and family to use good hand  hygiene technique  - Identify and instruct in appropriate isolation precautions for identified infection/condition  Outcome: Progressing     Problem: SAFETY ADULT  Goal: Patient will remain free of falls  Description: INTERVENTIONS:  - Educate patient/family on patient safety including physical limitations  - Instruct patient to call for assistance with activity   - Consult OT/PT to assist with strengthening/mobility   - Keep Call bell within reach  - Keep bed low and locked with side rails adjusted as appropriate  - Keep care items and personal belongings within reach  - Initiate and maintain comfort rounds  - Make Fall Risk Sign visible to staff  - Offer Toileting every 2 Hours, in advance of need  - Initiate/Maintain bed alarm  - Obtain necessary fall risk management equipment: bed alarm  - Apply yellow socks and bracelet for high fall risk patients  - Consider moving patient to room near nurses station  Outcome: Progressing  Goal: Maintain or return to baseline ADL function  Description: INTERVENTIONS:  -  Assess patient's ability to carry out ADLs; assess patient's baseline for ADL function and identify physical deficits which impact ability to perform ADLs (bathing, care of mouth/teeth, toileting, grooming, dressing, etc.)  - Assess/evaluate cause of self-care deficits   - Assess range of motion  - Assess patient's mobility; develop plan if impaired  - Assess patient's need for assistive devices and provide as appropriate  - Encourage maximum independence but intervene and supervise when necessary  - Involve family in performance of ADLs  - Assess for home care needs following discharge   - Consider OT consult to assist with ADL evaluation and planning for discharge  - Provide patient education as appropriate  Outcome: Progressing  Goal: Maintains/Returns to pre admission functional level  Description: INTERVENTIONS:  - Perform AM-PAC 6 Click Basic Mobility/ Daily Activity assessment daily.  - Set and  communicate daily mobility goal to care team and patient/family/caregiver.   - Collaborate with rehabilitation services on mobility goals if consulted  - Perform Range of Motion 3 times a day.  - Reposition patient every 2 hours.  - Dangle patient 3 times a day  - Stand patient 3 times a day  - Ambulate patient 3 times a day  - Out of bed to chair 3 times a day   - Out of bed for meals 3 times a day  - Out of bed for toileting  - Record patient progress and toleration of activity level   Outcome: Progressing     Problem: DISCHARGE PLANNING  Goal: Discharge to home or other facility with appropriate resources  Description: INTERVENTIONS:  - Identify barriers to discharge w/patient and caregiver  - Arrange for needed discharge resources and transportation as appropriate  - Identify discharge learning needs (meds, wound care, etc.)  - Arrange for interpretive services to assist at discharge as needed  - Refer to Case Management Department for coordinating discharge planning if the patient needs post-hospital services based on physician/advanced practitioner order or complex needs related to functional status, cognitive ability, or social support system  Outcome: Progressing     Problem: Knowledge Deficit  Goal: Patient/family/caregiver demonstrates understanding of disease process, treatment plan, medications, and discharge instructions  Description: Complete learning assessment and assess knowledge base.  Interventions:  - Provide teaching at level of understanding  - Provide teaching via preferred learning methods  Outcome: Progressing     Problem: Prexisting or High Potential for Compromised Skin Integrity  Goal: Skin integrity is maintained or improved  Description: INTERVENTIONS:  - Identify patients at risk for skin breakdown  - Assess and monitor skin integrity  - Assess and monitor nutrition and hydration status  - Monitor labs   - Assess for incontinence   - Turn and reposition patient  - Assist with  mobility/ambulation  - Relieve pressure over bony prominences  - Avoid friction and shearing  - Provide appropriate hygiene as needed including keeping skin clean and dry  - Evaluate need for skin moisturizer/barrier cream  - Collaborate with interdisciplinary team   - Patient/family teaching  - Consider wound care consult   Outcome: Progressing

## 2024-03-28 NOTE — DISCHARGE SUMMARY
[facility]  Discharge- Mathew Rico 1950, 73 y.o. male MRN: [id number]  Unit/Bed#: E5 -01 Encounter: [id number]  Primary Care Provider: Steve Dickerson DO   Date and time admitted to hospital: 3/26/2024  7:23 AM    * Sepsis (HCC)  Assessment & Plan  Patient presenting with sepsis of a urinary etiology as evidenced by leukocytosis of 14,000 as well as tachycardia  Treated with IV antibiotics with ceftriaxone  WBC count is normalized  Blood cultures remain negative  Urine culture revealed Candida likely colonization  Will transition to oral Keflex to complete 5-day course of antibiotics    Chronic diastolic congestive heart failure (HCC)  Assessment & Plan  Remains euvolemic  Continue beta-blocker            Type 2 diabetes mellitus with hyperglycemia, without long-term current use of insulin (HCC)  Assessment & Plan  Resume home metformin    Obstructive sleep apnea  Assessment & Plan  CPAP nightly    Multiple sclerosis (HCC)  Assessment & Plan  Continue outpatient follow-up with neurology    Generalized anxiety disorder  Assessment & Plan  Continue Zoloft    Chronic suprapubic catheter (HCC)  Assessment & Plan  SPT tube exchanged  Appreciate urology recommendations  History of suprapubic tube placement due to multiple sclerosis and bedbound status      Discharging Physician / Practitioner: Sg Kurtz MD  PCP: Steve Dickerson DO  Admission Date:   Admission Orders (From admission, onward)       Ordered        03/26/24 1027  Inpatient Admission  Once                          Discharge Date: 03/28/24    Medical Problems       Resolved Problems  Date Reviewed: 3/28/2024            Resolved    Urinary retention 3/27/2024     Resolved by  Soto Talley DO          Consultations During Hospital Stay:  urology    Procedures Performed:   Suprapubic catheter exchange    Significant Findings / Test Results:   XR chest 1 view portable    Result Date: 3/26/2024  Impression:  "Small area of tree-in-bud pattern of opacity in the right lateral midlung field which could be minimal infection/bronchiolitis. Workstation performed: [id number]     CT abdomen pelvis w contrast    Result Date: 3/26/2024  Impression: Cystitis despite the presence of suprapubic catheter with perivesical stranding slightly more pronounced when compared with February 15, 2024. Proctitis with perirectal fat stranding, also similar from February 15, 2024. Small patchy consolidation in the anterolateral left lower lobe suspicious for small area of focal pneumonia. Low radiation dose noncontrast chest CT follow-up in 3 to 6 months recommended to document interval resolution. This examination was marked \"immediate notification\" in Epic in order to begin the standard process by which the radiology reading room liaison alerts the referring practitioner. Workstation performed: [id number]      Incidental Findings:   none     Test Results Pending at Discharge (will require follow up):   none     Outpatient Tests Requested:  none    Complications:  none    Reason for Admission: Sepsis    Hospital Course:     Mathew Rico is a 73 y.o. male patient who originally presented to the hospital on 3/26/2024 with past medical history significant of CHF, type 2 diabetes, suprapubic catheter in place initially presented sepsis initially thought to be secondary to UTI.  He was treated with IV antibiotics.  Sepsis resolved.  WBC count within normal limits, blood cultures remain negative  Will complete oral antibiotics with oral Keflex.  Follow-up outpatient with primary care doctor.  Will be discharged home physical therapy      Please see above list of diagnoses and related plan for additional information.     Condition at Discharge: stable     Discharge Day Visit / Exam:     Subjective: Denies chest pain, shortness of breath, cough, fevers, chills  Vitals: Blood Pressure: 128/56 (03/28/24 0724)  Pulse: 78 (03/28/24 0724)  Temperature: " "98.3 °F (36.8 °C) (03/28/24 0724)  Temp Source: Oral (03/28/24 0724)  Respirations: 14 (03/28/24 0724)  Height: 5' 7\" (170.2 cm) (03/26/24 0728)  Weight - Scale: 62.4 kg (137 lb 9.1 oz) (03/28/24 0600)  SpO2: 97 % (03/28/24 0724)  Exam:   Physical Exam  Constitutional:       General: He is not in acute distress.     Appearance: He is well-developed. He is not diaphoretic.   HENT:      Head: Normocephalic and atraumatic.      Nose: Nose normal.      Mouth/Throat:      Pharynx: No oropharyngeal exudate.   Eyes:      General: No scleral icterus.     Conjunctiva/sclera: Conjunctivae normal.   Cardiovascular:      Rate and Rhythm: Normal rate and regular rhythm.      Heart sounds: Normal heart sounds. No murmur heard.     No friction rub. No gallop.   Pulmonary:      Effort: Pulmonary effort is normal. No respiratory distress.      Breath sounds: Normal breath sounds. No wheezing or rales.   Chest:      Chest wall: No tenderness.   Abdominal:      General: Bowel sounds are normal. There is no distension.      Palpations: Abdomen is soft.      Tenderness: There is no abdominal tenderness. There is no guarding.   Musculoskeletal:         General: No tenderness or deformity. Normal range of motion.      Cervical back: Normal range of motion and neck supple.   Skin:     General: Skin is warm and dry.      Findings: No erythema.   Neurological:      Mental Status: He is alert. Mental status is at baseline.       (  Discussion with Family: pt, brother    Discharge instructions/Information to patient and family:   See after visit summary for information provided to patient and family.      Provisions for Follow-Up Care:  See after visit summary for information related to follow-up care and any pertinent home health orders.      Disposition:     Home with VNA Services (Reminder: Complete face to face encounter)    For Discharges to [facility]:   Not Applicable to this Patient - Not Applicable to this " Patient    Planned Readmission: none     Discharge Statement:  I spent 60 minutes discharging the patient. This time was spent on the day of discharge. I had direct contact with the patient on the day of discharge. Greater than 50% of the total time was spent examining patient, answering all patient questions, arranging and discussing plan of care with patient as well as directly providing post-discharge instructions.  Additional time then spent on discharge activities.    Discharge Medications:  See after visit summary for reconciled discharge medications provided to patient and family.      ** Please Note: This note has been constructed using a voice recognition system **

## 2024-03-28 NOTE — PROGRESS NOTES
[facility]  Progress Note  Name: Mathew Rico I  MRN: [id number]  Unit/Bed#: E5 -01 I Date of Admission: 3/26/2024   Date of Service: 3/28/2024 I Hospital Day: 2    Assessment/Plan   * Sepsis (HCC)  Assessment & Plan  Patient presenting with sepsis of a urinary etiology as evidenced by leukocytosis of 14,000 as well as tachycardia  Treated with IV antibiotics with ceftriaxone  WBC count is normalized  Blood cultures remain negative  Urine culture revealed Candida likely colonization  Continue ceftriaxone, can transition to oral Keflex on discharge to complete 5-day course    Generalized weakness  Assessment & Plan  PT/OT recommending rehab  Medically clear for discharge awaiting placement  Follow-up with case management    Chronic diastolic congestive heart failure ([facility])  Assessment & Plan  Remains euvolemic  Continue beta-blocker            Type 2 diabetes mellitus with hyperglycemia, without long-term current use of insulin ([facility])  Assessment & Plan  Continue sliding-scale insulin    Obstructive sleep apnea  Assessment & Plan  CPAP nightly    Multiple sclerosis ([facility])  Assessment & Plan  Continue outpatient follow-up with neurology    Generalized anxiety disorder  Assessment & Plan  Continue Zoloft    Chronic suprapubic catheter ([facility])  Assessment & Plan  SPT tube exchanged  Appreciate urology recommendations  History of suprapubic tube placement due to multiple sclerosis and bedbound status         VTE Pharmacologic Prophylaxis:   Pharmacologic: Heparin  Mechanical VTE Prophylaxis in Place: Yes    Patient Centered Rounds: I have performed bedside rounds with nursing staff today.    Discussions with Specialists or Other Care Team Provider: cm, nursing    Education and Discussions with Family / Patient: pt, brother via phone    Time Spent for Care: 30 minutes.  More than 50% of total time spent on counseling and coordination of care as described above.    Current Length of Stay: 2  day(s)    Current Patient Status: Inpatient   Certification Statement: The patient will continue to require additional inpatient hospital stay due to see below    Discharge Plan: Medically clear awaiting placement    Code Status: Level 1 - Full Code      Subjective:   Denies chest pain, shortness of breath, cough fevers, chills    Objective:     Vitals:   Temp (24hrs), Av.3 °F (36.8 °C), Min:98.2 °F (36.8 °C), Max:98.4 °F (36.9 °C)    Temp:  [98.2 °F (36.8 °C)-98.4 °F (36.9 °C)] 98.3 °F (36.8 °C)  HR:  [78] 78  Resp:  [14-17] 14  BP: (123-151)/(56-74) 128/56  SpO2:  [97 %] 97 %  Body mass index is 21.55 kg/m².     Input and Output Summary (last 24 hours):       Intake/Output Summary (Last 24 hours) at 3/28/2024 1009  Last data filed at 3/28/2024 0901  Gross per 24 hour   Intake 880 ml   Output 1500 ml   Net -620 ml       Physical Exam:     Physical Exam  Constitutional:       General: He is not in acute distress.     Appearance: He is well-developed. He is not diaphoretic.   HENT:      Head: Normocephalic and atraumatic.      Nose: Nose normal.      Mouth/Throat:      Pharynx: No oropharyngeal exudate.   Eyes:      General: No scleral icterus.     Conjunctiva/sclera: Conjunctivae normal.   Cardiovascular:      Rate and Rhythm: Normal rate and regular rhythm.      Heart sounds: Normal heart sounds. No murmur heard.     No friction rub. No gallop.   Pulmonary:      Effort: Pulmonary effort is normal. No respiratory distress.      Breath sounds: Normal breath sounds. No wheezing or rales.   Chest:      Chest wall: No tenderness.   Abdominal:      General: Bowel sounds are normal. There is no distension.      Palpations: Abdomen is soft.      Tenderness: There is no abdominal tenderness. There is no guarding.   Musculoskeletal:         General: No tenderness or deformity. Normal range of motion.      Cervical back: Normal range of motion and neck supple.   Skin:     General: Skin is warm and dry.      Findings: No  erythema.   Neurological:      Mental Status: He is alert. Mental status is at baseline.         Additional Data:     Labs:    Results from last 7 days   Lab Units 03/28/24  0626 03/26/24  1350 03/26/24  0818   WBC Thousand/uL 9.64   < > 14.12*   HEMOGLOBIN g/dL 12.4   < > 15.2   HEMATOCRIT % 37.8   < > 46.0   PLATELETS Thousands/uL 407*   < > 564*   NEUTROS PCT %  --   --  75   LYMPHS PCT %  --   --  16   MONOS PCT %  --   --  5   EOS PCT %  --   --  3    < > = values in this interval not displayed.     Results from last 7 days   Lab Units 03/28/24  0626 03/27/24  0531 03/26/24  0818   SODIUM mmol/L 136   < > 135   POTASSIUM mmol/L 3.6   < > 3.9   CHLORIDE mmol/L 105   < > 98   CO2 mmol/L 24   < > 26   BUN mg/dL 9   < > 12   CREATININE mg/dL 0.68   < > 0.77   ANION GAP mmol/L 7   < > 11   CALCIUM mg/dL 9.1   < > 10.7*   ALBUMIN g/dL  --   --  4.2   TOTAL BILIRUBIN mg/dL  --   --  0.59   ALK PHOS U/L  --   --  92   ALT U/L  --   --  14   AST U/L  --   --  15   GLUCOSE RANDOM mg/dL 126   < > 129    < > = values in this interval not displayed.         Results from last 7 days   Lab Units 03/28/24  0727 03/27/24  2117 03/27/24  1607 03/27/24  1112 03/27/24  0803 03/26/24  2116 03/26/24  1613 03/22/24  1103   POC GLUCOSE mg/dl 129 177* 187* 152* 168* 103 183* 148*     Results from last 7 days   Lab Units 03/26/24  0818   HEMOGLOBIN A1C % 6.4*     Results from last 7 days   Lab Units 03/27/24  0531 03/26/24  1119 03/26/24  0855 03/26/24  0818   LACTIC ACID mmol/L  --  1.3 2.2*  --    PROCALCITONIN ng/ml 0.06  --   --  0.07           * I Have Reviewed All Lab Data Listed Above.  * Additional Pertinent Lab Tests Reviewed: All Labs Within Last 24 Hours Reviewed    Imaging:    Imaging Reports Reviewed Today Include: na  Imaging Personally Reviewed by Myself Includes:  na    Recent Cultures (last 7 days):     Results from last 7 days   Lab Units 03/26/24  0923 03/26/24  0855 03/26/24  0822 03/22/24  1022   BLOOD CULTURE  No  Growth at 24 hrs. No Growth at 24 hrs.  --   --    URINE CULTURE   --   --  10,000-19,000 cfu/ml Candida tropicalis* 80,000-89,000 cfu/ml Klebsiella pneumoniae*  20,000-29,000 cfu/ml Candida tropicalis*  20,000-29,000 cfu/ml Lactobacillus species*       Last 24 Hours Medication List:   Current Facility-Administered Medications   Medication Dose Route Frequency Provider Last Rate    acetaminophen  650 mg Oral Q6H PRN Soto Talley, DO      albuterol  2.5 mg Nebulization Q6H PRN Soto Talley, DO      ALPRAZolam  0.25 mg Oral BID PRN Soto Talley, DO      aluminum-magnesium hydroxide-simethicone  30 mL Oral Q6H PRN Soto Talley, DO      amLODIPine  10 mg Oral Daily Soto Talley, DO      And    atorvastatin  80 mg Oral Daily Soto Talley, DO      bisacodyl  10 mg Rectal Daily PRN Soto Talley, DO      cefTRIAXone  1,000 mg Intravenous Q24H Soto Talley, DO 1,000 mg (03/27/24 1303)    clopidogrel  75 mg Oral Daily Soto Talley, DO      gabapentin  300 mg Oral BID Soto Talley, DO      gabapentin  600 mg Oral HS Soto Talley, DO      heparin (porcine)  5,000 Units Subcutaneous Q8H [facility] Soto Talley, DO      insulin lispro  1-5 Units Subcutaneous TID  Soto Talley, DO      insulin lispro  1-5 Units Subcutaneous HS Soto Talley, DO      latanoprost  1 drop Both Eyes HS Soto Talley, DO      melatonin  3 mg Oral HS PRN Soto Talley, DO      ondansetron  4 mg Intravenous Q6H PRN Soto Talley, DO      pantoprazole  40 mg Oral BID  Soto Talley, DO      polyethylene glycol  17 g Oral Daily Soto Talley, DO      potassium citrate  20 mEq Oral Daily Soto Talley, DO      propranolol  60 mg Oral Daily Soto Talley, DO      senna-docusate sodium  2 tablet Oral HS Soto Talley, DO      sertraline  25 mg Oral HS Soto Talley, DO      sodium chloride  1 spray Each Nare Q1H PRN Soto Talley, DO          Today, Patient Was Seen By: Sg  MD Tessie    ** Please Note: Dictation voice to text software may have been used in the creation of this document. **

## 2024-03-28 NOTE — ASSESSMENT & PLAN NOTE
Patient presenting with sepsis of a urinary etiology as evidenced by leukocytosis of 14,000 as well as tachycardia  Treated with IV antibiotics with ceftriaxone  WBC count is normalized  Blood cultures remain negative  Urine culture revealed Candida likely colonization  Will transition to oral Keflex to complete 5-day course of antibiotics

## 2024-03-28 NOTE — CASE MANAGEMENT
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    Fall 2023    Weakness 2023    Accidental fall from chair 2023    Constipation 2023    Chronic diastolic congestive heart failure (HCC) 2023    Hyponatremia 2023    Excessive daytime sleepiness 2022    Shortness of breath 2022    Sepsis (HCC) 2021    Pancreatic mass 2020    Pancreatic lesion 2020    Bladder stones 2019    Bladder neck contracture 2019    History of CVA (cerebrovascular accident) 10/21/2018    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HCC) 2017    Diabetic neuropathy ([facility]) 2016    Chronic suprapubic catheter ([facility]) 2016    Thyroid nodule 2015    Cervical spinal stenosis 2014    Generalized anxiety disorder 2014    Obstructive sleep apnea 2013    Benign colon polyp 2012    Esophageal reflux 2012    Fatty liver 2012    Glaucoma 2012    Hyperlipidemia 2012    Multiple sclerosis (HCC) 2012    Type 2 diabetes mellitus with hyperglycemia, without long-term current use of insulin (HCC) 2012    Primary hypertension 2012      LOS (days): 2  Geometric Mean LOS (GMLOS) (days): 4.9  Days to GMLOS:2.9     OBJECTIVE:  Risk of Unplanned Readmission Score: 35.14         Current admission status: Inpatient   Preferred Pharmacy:   [facility]pharm[facility] #[id number] - [place] - [address]  [address] [address]  Phone: [phone] Fax: [phone]    [place], [place] -  [address] [address]  Phone: [phone] Fax: [phone]    [facility]  [address]   [facility]  [facility]  [address] [facility] [address]  Phone: [phone] Fax: [phone]    Primary Care Provider: Steve Dickerson DO    Primary Insurance: [facility]  Secondary Insurance:     DISCHARGE DETAILS:    Discharge planning discussed with:: Patient, Family, [employer]  Freedom of Choice: Yes  Comments - Freedom of Choice: STR  CM contacted family/caregiver?: Yes  Were Treatment Team discharge recommendations reviewed with patient/caregiver?: Yes  Did patient/caregiver verbalize understanding of patient care needs?: N/A- going to facility  Were patient/caregiver advised of the risks associated with not following Treatment Team discharge recommendations?: Yes    Contacts  Patient Contacts: Guzman Rico (Brother) [phone]  Relationship to Patient:: Family  Contact Method: Phone  Phone Number: Guzman Rico (Brother) [phone]  Reason/Outcome: Discharge Planning, Referral    Requested Home Health Care         Is the patient interested in HHC at discharge?: No    DME Referral Provided  Referral made for DME?: No    Other Referral/Resources/Interventions Provided:  Interventions: Short Term Rehab  Referral Comments: Referral sent to STR via Aidin    Would you like to participate in our [facility] service program?  : No - Declined    Treatment Team Recommendation: Short Term Rehab  Discharge Destination Plan:: Short Term Rehab      IMM Given (Date):: 03/28/24  IMM Given to:: Family  Family notified:: Guzman Rico (Brother)  [phone] (Home Phone)  Additional Comments: CM spoke with patient's MD who states patient is cleared for d/c but would like to go home with continued services. CM reached out to [facility] [phone] and spoke with Arina who states the DON would want STR as it is rec by PT/OT. BETO spoke with patient and his brother Guzman via phone and both agreed to STR. CM sent referral via Aidin. Per [facility] auth would not be needed  once facility is choosen. CM will update patient, family and medical team once d/c arrangements have been completed.  Update: Patient agreed to STR at [facility]. Per liasion they have a bed available for today. CM also reached out to patient's brother as requested by the patient and advised him of his d/c plan to facility either this evening or tomorrow pending suprapubic catheter upsizing in IR. .

## 2024-03-28 NOTE — ASSESSMENT & PLAN NOTE
PT/OT recommending rehab  Medically clear for discharge awaiting placement  Follow-up with case management

## 2024-03-28 NOTE — BRIEF OP NOTE (RAD/CATH)
IR SUPRAPUBIC CATHETER CHECK/CHANGE/REINSERTION/UPSIZE Procedure Note    PATIENT NAME: Mathew Rico  : 1950  MRN: [id number]    Pre-op Diagnosis:   1. Chronic suprapubic catheter (HCC)    2. Cystitis      Post-op Diagnosis:   1. Chronic suprapubic catheter (HCC)    2. Cystitis        Surgeon:   SHA Pablo  Assistants:     No qualified resident was available, Resident is only observing    Estimated Blood Loss: 0 ml  Findings: upsize to 20 fr silicone spt    Specimens: none.    Complications:  none.    Anesthesia: local    SHA Pablo     Date: 3/28/2024  Time: 4:32 PM

## 2024-03-28 NOTE — PLAN OF CARE
Problem: Potential for Falls  Goal: Patient will remain free of falls  Description: INTERVENTIONS:  - Educate patient/family on patient safety including physical limitations  - Instruct patient to call for assistance with activity   - Consult OT/PT to assist with strengthening/mobility   - Keep Call bell within reach  - Keep bed low and locked with side rails adjusted as appropriate  - Keep care items and personal belongings within reach  - Initiate and maintain comfort rounds  - Make Fall Risk Sign visible to staff  - Offer Toileting every 2 Hours, in advance of need  - Initiate/Maintain bed alarm  - Apply yellow socks and bracelet for high fall risk patients  - Consider moving patient to room near nurses station  Outcome: Progressing     Problem: PAIN - ADULT  Goal: Verbalizes/displays adequate comfort level or baseline comfort level  Description: Interventions:  - Encourage patient to monitor pain and request assistance  - Assess pain using appropriate pain scale  - Administer analgesics based on type and severity of pain and evaluate response  - Implement non-pharmacological measures as appropriate and evaluate response  - Consider cultural and social influences on pain and pain management  - Notify physician/advanced practitioner if interventions unsuccessful or patient reports new pain  Outcome: Progressing     Problem: INFECTION - ADULT  Goal: Absence or prevention of progression during hospitalization  Description: INTERVENTIONS:  - Assess and monitor for signs and symptoms of infection  - Monitor lab/diagnostic results  - Monitor all insertion sites, i.e. indwelling lines, tubes, and drains  - Monitor endotracheal if appropriate and nasal secretions for changes in amount and color  - [place] appropriate cooling/warming therapies per order  - Administer medications as ordered  - Instruct and encourage patient and family to use good hand hygiene technique  - Identify and instruct in appropriate isolation  precautions for identified infection/condition  Outcome: Progressing     Problem: SAFETY ADULT  Goal: Patient will remain free of falls  Description: INTERVENTIONS:  - Educate patient/family on patient safety including physical limitations  - Instruct patient to call for assistance with activity   - Consult OT/PT to assist with strengthening/mobility   - Keep Call bell within reach  - Keep bed low and locked with side rails adjusted as appropriate  - Keep care items and personal belongings within reach  - Initiate and maintain comfort rounds  - Make Fall Risk Sign visible to staff  - Offer Toileting every 2 Hours, in advance of need  - Initiate/Maintain bed alarm  - Apply yellow socks and bracelet for high fall risk patients  - Consider moving patient to room near nurses station  Outcome: Progressing  Goal: Maintain or return to baseline ADL function  Description: INTERVENTIONS:  -  Assess patient's ability to carry out ADLs; assess patient's baseline for ADL function and identify physical deficits which impact ability to perform ADLs (bathing, care of mouth/teeth, toileting, grooming, dressing, etc.)  - Assess/evaluate cause of self-care deficits   - Assess range of motion  - Assess patient's mobility; develop plan if impaired  - Assess patient's need for assistive devices and provide as appropriate  - Encourage maximum independence but intervene and supervise when necessary  - Involve family in performance of ADLs  - Assess for home care needs following discharge   - Consider OT consult to assist with ADL evaluation and planning for discharge  - Provide patient education as appropriate  Outcome: Progressing  Goal: Maintains/Returns to pre admission functional level  Description: INTERVENTIONS:  - Perform AM-PAC 6 Click Basic Mobility/ Daily Activity assessment daily.  - Set and communicate daily mobility goal to care team and patient/family/caregiver.   - Collaborate with rehabilitation services on mobility goals  if consulted  - Perform Range of Motion 3 times a day.  - Reposition patient every 2 hours.  - Out of bed to chair 3 times a day   - Out of bed for meals 3 times a day  - Out of bed for toileting  - Record patient progress and toleration of activity level   Outcome: Progressing     Problem: DISCHARGE PLANNING  Goal: Discharge to home or other facility with appropriate resources  Description: INTERVENTIONS:  - Identify barriers to discharge w/patient and caregiver  - Arrange for needed discharge resources and transportation as appropriate  - Identify discharge learning needs (meds, wound care, etc.)  - Arrange for interpretive services to assist at discharge as needed  - Refer to Case Management Department for coordinating discharge planning if the patient needs post-hospital services based on physician/advanced practitioner order or complex needs related to functional status, cognitive ability, or social support system  Outcome: Progressing     Problem: Knowledge Deficit  Goal: Patient/family/caregiver demonstrates understanding of disease process, treatment plan, medications, and discharge instructions  Description: Complete learning assessment and assess knowledge base.  Interventions:  - Provide teaching at level of understanding  - Provide teaching via preferred learning methods  Outcome: Progressing     Problem: Prexisting or High Potential for Compromised Skin Integrity  Goal: Skin integrity is maintained or improved  Description: INTERVENTIONS:  - Identify patients at risk for skin breakdown  - Assess and monitor skin integrity  - Assess and monitor nutrition and hydration status  - Monitor labs   - Assess for incontinence   - Turn and reposition patient  - Assist with mobility/ambulation  - Relieve pressure over bony prominences  - Avoid friction and shearing  - Provide appropriate hygiene as needed including keeping skin clean and dry  - Evaluate need for skin moisturizer/barrier cream  - Collaborate with  interdisciplinary team   - Patient/family teaching  - Consider wound care consult   Outcome: Progressing

## 2024-03-28 NOTE — ASSESSMENT & PLAN NOTE
Patient presenting with sepsis of a urinary etiology as evidenced by leukocytosis of 14,000 as well as tachycardia  Treated with IV antibiotics with ceftriaxone  WBC count is normalized  Blood cultures remain negative  Urine culture revealed Candida likely colonization  Continue ceftriaxone, can transition to oral Keflex on discharge to complete 5-day course

## 2024-03-28 NOTE — CONSULTS
e-Consult (IPC)  - Interventional Radiology  Mathew Rico 73 y.o. male MRN: [id number]  Unit/Bed#: E5 -01 Encounter: [id number]          Interventional Radiology has been consulted to evaluate Mathew Rico    We were consulted by Urology concerning this patient with multiple admission for suprapubic tube dysfunction and clogging.    Inpatient Consult to IR  Consult performed by: SHA Pablo  Consult ordered by: Octavia Storey PA-C        03/28/24    Assessment/Recommendation:   74 yo male with DM type 2, GERSON, and MS c/b neurogenic bladder with chronic suprapubic catheter who presented to ED on 3/26/24 with complaints of groin pain and dizziness. Patient with leukocytosis and tachycardia meeting sepsis criteria.     Patient with h/o multiple admissions dysfunction, calcification with blockage. Urology following. Patient currently has 18Fr in place. Unable to manually upsize. IR consulted for evaluation and possible SPT upsize/exchange.     -Discussed case with Dr Vasquez  -plan for IR suprapubic catheter exchange/upsize  -Appreciate Urology recommendations  -remainder of care per Primary team    11-20 minutes, >50% of the total time devoted to medical consultative verbal/EMR discussion between providers. Written report will be generated in the EMR.     Thank you for allowing Interventional Radiology to participate in the care of Mathew Rico. Please don't hesitate to call or TigerText us with any questions.     SHA Pablo

## 2024-03-28 NOTE — PROGRESS NOTES
Patient:  FELIPE LARIOS    MRN:  [id number]    Aidin Request ID:  [id number]    Level of care reserved:  Skilled Nursing Facility    Partner Reserved:  [facility] , [place] [address] [phone]    Clinical needs requested:    Geography searched:  20 miles around [address]    Start of Service:    Request sent:  9:51am EDT on 3/28/2024 by Brice Terrazas    Partner reserved:  12:57pm EDT on 3/28/2024 by Brice Terrazas    Choice list shared:  12:56pm EDT on 3/28/2024 by Brice Terrazas

## 2024-03-28 NOTE — ASSESSMENT & PLAN NOTE
SPT tube exchanged  Appreciate urology recommendations  History of suprapubic tube placement due to multiple sclerosis and bedbound status

## 2024-03-28 NOTE — UTILIZATION REVIEW
NOTIFICATION OF ADMISSION DISCHARGE   This is a Notification of Discharge from [facility]. Please be advised that this patient has been discharge from our facility. Below you will find the admission and discharge date and time including the patient’s disposition.   UTILIZATION REVIEW CONTACT:  Marsha Garcia  Utilization   Network Utilization Review Department  Phone: [phone] x carefully listen to the prompts. All voicemails are confidential.  Email: [email or web address]     ADMISSION INFORMATION  PRESENTATION DATE: 3/26/2024  7:23 AM  OBERVATION ADMISSION DATE:   INPATIENT ADMISSION DATE: 3/26/24 10:27 AM   DISCHARGE DATE: No discharge date for patient encounter.   DISPOSITION:Home/Self Care    Network Utilization Review Department  ATTENTION: Please call with any questions or concerns to [phone] and carefully listen to the prompts so that you are directed to the right person. All voicemails are confidential.   For Discharge needs, contact Care Management DC Support Team at [phone] opt. 2  Send all requests for admission clinical reviews, approved or denied determinations and any other requests to dedicated fax number below belonging to the campus where the patient is receiving treatment. List of dedicated fax numbers for the Facilities:  FACILITY NAME UR FAX NUMBER   ADMISSION DENIALS (Administrative/Medical Necessity) [phone]   DISCHARGE SUPPORT TEAM ([facility]) [phone]   PARENT CHILD HEALTH (Maternity/NICU/Pediatrics) [phone]   [facility] [phone]   [facility] [phone]   [facility] [phone]   [facility] [phone]   [facility] [phone]   [facility] [phone]   [facility] [phone]   [facility]  [place] [phone]   [facility] [phone]   [facility] [phone]   [facility] [phone]   [facility] [phone]

## 2024-03-29 VITALS
BODY MASS INDEX: 21.8 KG/M2 | OXYGEN SATURATION: 97 % | RESPIRATION RATE: 17 BRPM | TEMPERATURE: 98.8 F | HEIGHT: 67 IN | DIASTOLIC BLOOD PRESSURE: 58 MMHG | HEART RATE: 68 BPM | SYSTOLIC BLOOD PRESSURE: 117 MMHG | WEIGHT: 138.89 LBS

## 2024-03-29 LAB
ANION GAP SERPL CALCULATED.3IONS-SCNC: 6 MMOL/L (ref 4–13)
BACTERIA UR CULT: ABNORMAL
BUN SERPL-MCNC: 10 MG/DL (ref 5–25)
CALCIUM SERPL-MCNC: 9.4 MG/DL (ref 8.4–10.2)
CHLORIDE SERPL-SCNC: 107 MMOL/L (ref 96–108)
CO2 SERPL-SCNC: 25 MMOL/L (ref 21–32)
CREAT SERPL-MCNC: 0.75 MG/DL (ref 0.6–1.3)
ERYTHROCYTE [DISTWIDTH] IN BLOOD BY AUTOMATED COUNT: 14.1 % (ref 11.6–15.1)
GFR SERPL CREATININE-BSD FRML MDRD: 90 ML/MIN/1.73SQ M
GLUCOSE SERPL-MCNC: 119 MG/DL (ref 65–140)
GLUCOSE SERPL-MCNC: 126 MG/DL (ref 65–140)
GLUCOSE SERPL-MCNC: 143 MG/DL (ref 65–140)
HCT VFR BLD AUTO: 39.4 % (ref 36.5–49.3)
HGB BLD-MCNC: 12.8 G/DL (ref 12–17)
MCH RBC QN AUTO: 29.8 PG (ref 26.8–34.3)
MCHC RBC AUTO-ENTMCNC: 32.5 G/DL (ref 31.4–37.4)
MCV RBC AUTO: 92 FL (ref 82–98)
PLATELET # BLD AUTO: 420 THOUSANDS/UL (ref 149–390)
PMV BLD AUTO: 8.4 FL (ref 8.9–12.7)
POTASSIUM SERPL-SCNC: 3.8 MMOL/L (ref 3.5–5.3)
RBC # BLD AUTO: 4.3 MILLION/UL (ref 3.88–5.62)
SODIUM SERPL-SCNC: 138 MMOL/L (ref 135–147)
WBC # BLD AUTO: 10.68 THOUSAND/UL (ref 4.31–10.16)

## 2024-03-29 PROCEDURE — 85027 COMPLETE CBC AUTOMATED: CPT | Performed by: INTERNAL MEDICINE

## 2024-03-29 PROCEDURE — 82948 REAGENT STRIP/BLOOD GLUCOSE: CPT

## 2024-03-29 PROCEDURE — 99239 HOSP IP/OBS DSCHRG MGMT >30: CPT | Performed by: STUDENT IN AN ORGANIZED HEALTH CARE EDUCATION/TRAINING PROGRAM

## 2024-03-29 PROCEDURE — 80048 BASIC METABOLIC PNL TOTAL CA: CPT | Performed by: INTERNAL MEDICINE

## 2024-03-29 RX ORDER — ACETAMINOPHEN 325 MG/1
650 TABLET ORAL EVERY 6 HOURS PRN
Start: 2024-03-29

## 2024-03-29 RX ORDER — POTASSIUM CITRATE 10 MEQ/1
20 TABLET, EXTENDED RELEASE ORAL DAILY
Start: 2024-03-30

## 2024-03-29 RX ADMIN — PROPRANOLOL HYDROCHLORIDE 60 MG: 60 CAPSULE, EXTENDED RELEASE ORAL at 09:35

## 2024-03-29 RX ADMIN — ACETAMINOPHEN 325MG 650 MG: 325 TABLET ORAL at 09:36

## 2024-03-29 RX ADMIN — POTASSIUM CITRATE 20 MEQ: 10 TABLET, EXTENDED RELEASE ORAL at 09:35

## 2024-03-29 RX ADMIN — GABAPENTIN 300 MG: 300 CAPSULE ORAL at 09:35

## 2024-03-29 RX ADMIN — HEPARIN SODIUM 5000 UNITS: 5000 INJECTION INTRAVENOUS; SUBCUTANEOUS at 13:27

## 2024-03-29 RX ADMIN — CLOPIDOGREL BISULFATE 75 MG: 75 TABLET ORAL at 09:36

## 2024-03-29 RX ADMIN — CEFTRIAXONE 1000 MG: 1 INJECTION, SOLUTION INTRAVENOUS at 13:27

## 2024-03-29 RX ADMIN — HEPARIN SODIUM 5000 UNITS: 5000 INJECTION INTRAVENOUS; SUBCUTANEOUS at 05:30

## 2024-03-29 RX ADMIN — AMLODIPINE BESYLATE 10 MG: 10 TABLET ORAL at 09:36

## 2024-03-29 RX ADMIN — ATORVASTATIN CALCIUM 80 MG: 80 TABLET, FILM COATED ORAL at 09:36

## 2024-03-29 RX ADMIN — PANTOPRAZOLE SODIUM 40 MG: 40 TABLET, DELAYED RELEASE ORAL at 05:30

## 2024-03-29 NOTE — CASE MANAGEMENT
Case Management Discharge Planning Note    Patient name Mathew Rico  Location East 5 /E5 -* MRN [id number]  : 1950 Date 3/29/2024       Current Admission Date: 3/26/2024  Current Admission Diagnosis:Sepsis (HCC)   Patient Active Problem List    Diagnosis Date Noted    Generalized weakness 2024    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    Fall 2023    Weakness 2023    Accidental fall from chair 2023    Constipation 2023    Chronic diastolic congestive heart failure (HCC) 2023    Hyponatremia 2023    Excessive daytime sleepiness 2022    Shortness of breath 2022    Sepsis (HCC) 2021    Pancreatic mass 2020    Pancreatic lesion 2020    Bladder stones 2019    Bladder neck contracture 2019    History of CVA (cerebrovascular accident) 10/21/2018    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9  Days to GMLOS:1.8     OBJECTIVE:  Risk of Unplanned Readmission Score: 36.19         Current admission status: Inpatient   Preferred Pharmacy:   [id number] [place] - [id number] [facility] [address]  Phone: [phone] Fax: [phone]    [place]  [address] [facility] [address]  Phone: [phone] Fax: [phone]    [facility]  [place]  [facility] - [address]  [facility],  [address]  [facility],  First [address] [facility] [address]  Phone: [phone] Fax: [phone]    Primary Care Provider: Steve Dickerson DO    Primary Insurance: [employer] [facility]  Secondary Insurance:     DISCHARGE DETAILS:    Discharge planning discussed with:: Patient, Family and Senior [employer]  Freedom of Choice: Yes  Comments - Freedom of Choice: [facility]  CM contacted family/caregiver?: Yes  Were Treatment Team discharge recommendations reviewed with patient/caregiver?: Yes  Did patient/caregiver verbalize understanding of patient care needs?: N/A- going to facility  Were patient/caregiver advised of the risks associated with not following Treatment Team discharge recommendations?: Yes    Contacts  Patient Contacts: Guzman Rico (Brother) [phone]  Relationship to Patient:: Family  Contact Method: Phone  Phone Number: Guzman Rico (Brother) [phone]  Reason/Outcome: Discharge Planning    Requested Home Health Care         Is the patient interested in HHC at discharge?: No    DME Referral Provided  Referral made for DME?: No    Other Referral/Resources/Interventions Provided:  Interventions: Short Term Rehab  Referral Comments: [facility]    Would you like to participate in our [facility] service program?  : No - Declined    Treatment Team Recommendation: Short Term Rehab  Discharge Destination Plan:: Short Term Rehab  Transport at Discharge : Stretcher van  Dispatcher Contacted: Yes  Number/Name of Dispatcher: Roundtrip     ETA of Transport (Date): 03/29/24  ETA of Transport (Time): 1430     Transfer Mode: Stretcher  Accompanied by: EMS personnel     IMM Given (Date):: 03/29/24  IMM Given to:: Family  Family notified:: Guzman Rico Brother [phone]  Additional Comments: Patient cleared for d/c today to [facility] via [facility]cher. Patient and family verbally agreed to d/c plan to short term rehab. [facility] state they agree that he needs placement before returning home and resuming home services that they arrange for patient. IMM was reviewed again with patient and family. Verbal Consent given for CM to sign on family and patient's behalf. No barriers to d/c at this time.  Update: DC Summary faxed to [facility] as request [phone]

## 2024-03-29 NOTE — PLAN OF CARE
Problem: Potential for Falls  Goal: Patient will remain free of falls  Description: INTERVENTIONS:  - Educate patient/family on patient safety including physical limitations  - Instruct patient to call for assistance with activity   - Consult OT/PT to assist with strengthening/mobility   - Keep Call bell within reach  - Keep bed low and locked with side rails adjusted as appropriate  - Keep care items and personal belongings within reach  - Initiate and maintain comfort rounds  - Make Fall Risk Sign visible to staff  - Apply yellow socks and bracelet for high fall risk patients  - Consider moving patient to room near nurses station  Outcome: Progressing     Problem: PAIN - ADULT  Goal: Verbalizes/displays adequate comfort level or baseline comfort level  Description: Interventions:  - Encourage patient to monitor pain and request assistance  - Assess pain using appropriate pain scale  - Administer analgesics based on type and severity of pain and evaluate response  - Implement non-pharmacological measures as appropriate and evaluate response  - Consider cultural and social influences on pain and pain management  - Notify physician/advanced practitioner if interventions unsuccessful or patient reports new pain  Outcome: Progressing     Problem: INFECTION - ADULT  Goal: Absence or prevention of progression during hospitalization  Description: INTERVENTIONS:  - Assess and monitor for signs and symptoms of infection  - Monitor lab/diagnostic results  - Monitor all insertion sites, i.e. indwelling lines, tubes, and drains  - Monitor endotracheal if appropriate and nasal secretions for changes in amount and color  - [place] appropriate cooling/warming therapies per order  - Administer medications as ordered  - Instruct and encourage patient and family to use good hand hygiene technique  - Identify and instruct in appropriate isolation precautions for identified infection/condition  Outcome: Progressing     Problem:  SAFETY ADULT  Goal: Patient will remain free of falls  Description: INTERVENTIONS:  - Educate patient/family on patient safety including physical limitations  - Instruct patient to call for assistance with activity   - Consult OT/PT to assist with strengthening/mobility   - Keep Call bell within reach  - Keep bed low and locked with side rails adjusted as appropriate  - Keep care items and personal belongings within reach  - Initiate and maintain comfort rounds  - Make Fall Risk Sign visible to staff  - Apply yellow socks and bracelet for high fall risk patients  - Consider moving patient to room near nurses station  Outcome: Progressing     Problem: DISCHARGE PLANNING  Goal: Discharge to home or other facility with appropriate resources  Description: INTERVENTIONS:  - Identify barriers to discharge w/patient and caregiver  - Arrange for needed discharge resources and transportation as appropriate  - Identify discharge learning needs (meds, wound care, etc.)  - Arrange for interpretive services to assist at discharge as needed  - Refer to Case Management Department for coordinating discharge planning if the patient needs post-hospital services based on physician/advanced practitioner order or complex needs related to functional status, cognitive ability, or social support system  Outcome: Progressing     Problem: Knowledge Deficit  Goal: Patient/family/caregiver demonstrates understanding of disease process, treatment plan, medications, and discharge instructions  Description: Complete learning assessment and assess knowledge base.  Interventions:  - Provide teaching at level of understanding  - Provide teaching via preferred learning methods  Outcome: Progressing

## 2024-03-29 NOTE — ASSESSMENT & PLAN NOTE
History of suprapubic tube placement due to multiple sclerosis and bedbound status  Catheter upsized by IR on 3/28

## 2024-03-29 NOTE — PLAN OF CARE
Problem: Potential for Falls  Goal: Patient will remain free of falls  Description: INTERVENTIONS:  - Educate patient/family on patient safety including physical limitations  - Instruct patient to call for assistance with activity   - Consult OT/PT to assist with strengthening/mobility   - Keep Call bell within reach  - Keep bed low and locked with side rails adjusted as appropriate  - Keep care items and personal belongings within reach  - Initiate and maintain comfort rounds  - Make Fall Risk Sign visible to staff  - Offer Toileting every 2 Hours, in advance of need  - Initiate/Maintain bed alarm  - Obtain necessary fall risk management equipment: bed alarm  - Apply yellow socks and bracelet for high fall risk patients  - Consider moving patient to room near nurses station  Outcome: Progressing     Problem: PAIN - ADULT  Goal: Verbalizes/displays adequate comfort level or baseline comfort level  Description: Interventions:  - Encourage patient to monitor pain and request assistance  - Assess pain using appropriate pain scale  - Administer analgesics based on type and severity of pain and evaluate response  - Implement non-pharmacological measures as appropriate and evaluate response  - Consider cultural and social influences on pain and pain management  - Notify physician/advanced practitioner if interventions unsuccessful or patient reports new pain  Outcome: Progressing     Problem: INFECTION - ADULT  Goal: Absence or prevention of progression during hospitalization  Description: INTERVENTIONS:  - Assess and monitor for signs and symptoms of infection  - Monitor lab/diagnostic results  - Monitor all insertion sites, i.e. indwelling lines, tubes, and drains  - Monitor endotracheal if appropriate and nasal secretions for changes in amount and color  - [place] appropriate cooling/warming therapies per order  - Administer medications as ordered  - Instruct and encourage patient and family to use good hand  hygiene technique  - Identify and instruct in appropriate isolation precautions for identified infection/condition  Outcome: Progressing     Problem: SAFETY ADULT  Goal: Patient will remain free of falls  Description: INTERVENTIONS:  - Educate patient/family on patient safety including physical limitations  - Instruct patient to call for assistance with activity   - Consult OT/PT to assist with strengthening/mobility   - Keep Call bell within reach  - Keep bed low and locked with side rails adjusted as appropriate  - Keep care items and personal belongings within reach  - Initiate and maintain comfort rounds  - Make Fall Risk Sign visible to staff  - Offer Toileting every 2 Hours, in advance of need  - Initiate/Maintain bed alarm  - Obtain necessary fall risk management equipment: bed alarm  - Apply yellow socks and bracelet for high fall risk patients  - Consider moving patient to room near nurses station  Outcome: Progressing  Goal: Maintain or return to baseline ADL function  Description: INTERVENTIONS:  -  Assess patient's ability to carry out ADLs; assess patient's baseline for ADL function and identify physical deficits which impact ability to perform ADLs (bathing, care of mouth/teeth, toileting, grooming, dressing, etc.)  - Assess/evaluate cause of self-care deficits   - Assess range of motion  - Assess patient's mobility; develop plan if impaired  - Assess patient's need for assistive devices and provide as appropriate  - Encourage maximum independence but intervene and supervise when necessary  - Involve family in performance of ADLs  - Assess for home care needs following discharge   - Consider OT consult to assist with ADL evaluation and planning for discharge  - Provide patient education as appropriate  Outcome: Progressing  Goal: Maintains/Returns to pre admission functional level  Description: INTERVENTIONS:  - Perform AM-PAC 6 Click Basic Mobility/ Daily Activity assessment daily.  - Set and  communicate daily mobility goal to care team and patient/family/caregiver.   - Collaborate with rehabilitation services on mobility goals if consulted  - Perform Range of Motion 3 times a day.  - Reposition patient every 2 hours.  - Dangle patient 3 times a day  - Stand patient 3 times a day  - Ambulate patient 3 times a day  - Out of bed to chair 3 times a day   - Out of bed for meals 3 times a day  - Out of bed for toileting  - Record patient progress and toleration of activity level   Outcome: Progressing     Problem: DISCHARGE PLANNING  Goal: Discharge to home or other facility with appropriate resources  Description: INTERVENTIONS:  - Identify barriers to discharge w/patient and caregiver  - Arrange for needed discharge resources and transportation as appropriate  - Identify discharge learning needs (meds, wound care, etc.)  - Arrange for interpretive services to assist at discharge as needed  - Refer to Case Management Department for coordinating discharge planning if the patient needs post-hospital services based on physician/advanced practitioner order or complex needs related to functional status, cognitive ability, or social support system  Outcome: Progressing     Problem: Knowledge Deficit  Goal: Patient/family/caregiver demonstrates understanding of disease process, treatment plan, medications, and discharge instructions  Description: Complete learning assessment and assess knowledge base.  Interventions:  - Provide teaching at level of understanding  - Provide teaching via preferred learning methods  Outcome: Progressing     Problem: Prexisting or High Potential for Compromised Skin Integrity  Goal: Skin integrity is maintained or improved  Description: INTERVENTIONS:  - Identify patients at risk for skin breakdown  - Assess and monitor skin integrity  - Assess and monitor nutrition and hydration status  - Monitor labs   - Assess for incontinence   - Turn and reposition patient  - Assist with  mobility/ambulation  - Relieve pressure over bony prominences  - Avoid friction and shearing  - Provide appropriate hygiene as needed including keeping skin clean and dry  - Evaluate need for skin moisturizer/barrier cream  - Collaborate with interdisciplinary team   - Patient/family teaching  - Consider wound care consult   Outcome: Progressing

## 2024-03-29 NOTE — ASSESSMENT & PLAN NOTE
Patient presenting with sepsis of a urinary etiology as evidenced by leukocytosis of 14,000 as well as tachycardia  Treated with IV antibiotics with ceftriaxone  WBC count is normalized  Blood cultures remain negative  Urine culture revealed Candida likely colonization  Discharge on oral Keflex on discharge to complete 5-day course

## 2024-03-29 NOTE — DISCHARGE SUMMARY
[facility]  Discharge- Mathew Rico 1950, 73 y.o. male MRN: [id number]  Unit/Bed#: E5 -01 Encounter: [id number]  Primary Care Provider: Steve Dickerson DO   Date and time admitted to hospital: 3/26/2024  7:23 AM    Generalized weakness  Assessment & Plan  PT/OT recommending rehab  Medically clear for discharge     Chronic diastolic congestive heart failure (HCC)  Assessment & Plan  Remains euvolemic  Continue propranolol 60 mg daily          Type 2 diabetes mellitus with diabetic polyneuropathy, without long-term current use of insulin (HCC)  Assessment & Plan  Continue home Trulicity  Glucose 143 at discharge  Continue gabapentin 300 mg twice daily and 600 mg at bedtime      Obstructive sleep apnea  Assessment & Plan  CPAP nightly    Multiple sclerosis (HCC)  Assessment & Plan  Continue outpatient follow-up with neurology    Primary hypertension  Assessment & Plan  BP reviewed and acceptable  Continue amlodipine 10 mg daily    Generalized anxiety disorder  Assessment & Plan  Continue Zoloft    Chronic suprapubic catheter (HCC)  Assessment & Plan  History of suprapubic tube placement due to multiple sclerosis and bedbound status  Catheter upsized by IR on 3/28    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HCC)  Assessment & Plan  Continue Plavix 75 mg daily and blood pressure control    * Sepsis (HCC)  Assessment & Plan  Patient presenting with sepsis of a urinary etiology as evidenced by leukocytosis of 14,000 as well as tachycardia  Treated with IV antibiotics with ceftriaxone  WBC count is normalized  Blood cultures remain negative  Urine culture revealed Candida likely colonization  Discharge on oral Keflex on discharge to complete 5-day course        Medical Problems       Resolved Problems  Date Reviewed: 3/28/2024            Resolved    Urinary retention 3/27/2024     Resolved by  Soto Talley DO        Discharging Physician / Practitioner: Cristin Booth MD  PCP: Steve  "T Dickerson, DO  Admission Date:   Admission Orders (From admission, onward)       Ordered        03/26/24 1027  Inpatient Admission  Once                          Discharge Date: 03/29/24    Consultations During Hospital Stay:  Urology  IR    Procedures Performed:   Upsizing catheter    Significant Findings / Test Results:   UTI    Incidental Findings:   None  Test Results Pending at Discharge (will require follow up):   None     Outpatient Tests Requested:  None    Complications:  None    Reason for Admission: Groin pain    Hospital Course:   Mathew Rico is a 73 y.o. male patient who originally presented to the hospital on 3/26/2024 due to groin pain.  On admission patient met sepsis criteria likely source UTI in the setting of chronic suprapubic catheter and UTI history.  Other infectious workup negative, blood cultures negative.  Urine culture with Candida.  Patient treated with antibiotics.  At the time of discharge evaluated by PT/OT who recommended short-term rehab.    Please see above list of diagnoses and related plan for additional information.     Condition at Discharge: stable    Discharge Day Visit / Exam:   Subjective: Patient feels well this morning.  Has no complaints.  Tolerating oral intake.  No pain  Vitals: Blood Pressure: 117/58 (03/29/24 0927)  Pulse: 68 (03/28/24 2333)  Temperature: 98.8 °F (37.1 °C) (03/28/24 2333)  Temp Source: Oral (03/28/24 1518)  Respirations: 17 (03/29/24 0612)  Height: 5' 7\" (170.2 cm) (03/26/24 0728)  Weight - Scale: 63 kg (138 lb 14.2 oz) (03/29/24 0600)  SpO2: 97 % (03/28/24 2333)  Exam:   Physical Exam  Vitals and nursing note reviewed.   Constitutional:       General: He is not in acute distress.     Appearance: He is well-developed.   HENT:      Head: Normocephalic and atraumatic.   Eyes:      Conjunctiva/sclera: Conjunctivae normal.   Cardiovascular:      Rate and Rhythm: Normal rate and regular rhythm.   Pulmonary:      Effort: Pulmonary effort is normal. No " respiratory distress.      Breath sounds: No wheezing or rales.   Abdominal:      Palpations: Abdomen is soft.      Tenderness: There is no abdominal tenderness.   Musculoskeletal:      Cervical back: Neck supple.      Right lower leg: No edema.      Left lower leg: No edema.   Skin:     General: Skin is warm and dry.      Findings: No lesion or rash.   Neurological:      Mental Status: He is alert.   Psychiatric:         Mood and Affect: Mood normal.         Behavior: Behavior normal.          Discussion with Family:  no Clinical updates.     Discharge instructions/Information to patient and family:   See after visit summary for information provided to patient and family.      Provisions for Follow-Up Care:  See after visit summary for information related to follow-up care and any pertinent home health orders.      Mobility at time of Discharge:   Basic Mobility Inpatient Raw Score: 9  -HLM Goal: 3: Sit at edge of bed  JH-HLM Achieved: 2: Bed activities/Dependent transfer  HLM Goal NOT achieved. Continue to encourage mobility in post discharge setting.     Disposition:   Other: Short-term rehab [facility]    Planned Readmission: no     Discharge Statement:  I spent 35 minutes discharging the patient. This time was spent on the day of discharge. I had direct contact with the patient on the day of discharge. Greater than 50% of the total time was spent examining patient, answering all patient questions, arranging and discussing plan of care with patient as well as directly providing post-discharge instructions.  Additional time then spent on discharge activities.    Discharge Medications:  See after visit summary for reconciled discharge medications provided to patient and/or family.      **Please Note: This note may have been constructed using a voice recognition system**

## 2024-03-29 NOTE — ASSESSMENT & PLAN NOTE
Continue home Trulicity  Glucose 143 at discharge  Continue gabapentin 300 mg twice daily and 600 mg at bedtime

## 2024-03-31 ENCOUNTER — HOSPITAL ENCOUNTER (INPATIENT)
Facility: HOSPITAL | Age: 74
LOS: 1 days | Discharge: NURSING, REHAB OR LONG-TERM CARE | DRG: 089 | End: 2024-04-02
Attending: STUDENT IN AN ORGANIZED HEALTH CARE EDUCATION/TRAINING PROGRAM | Admitting: INTERNAL MEDICINE
Payer: MEDICARE

## 2024-03-31 ENCOUNTER — APPOINTMENT (OUTPATIENT)
Dept: RADIOLOGY | Facility: HOSPITAL | Age: 74
DRG: 089 | End: 2024-03-31
Payer: MEDICARE

## 2024-03-31 ENCOUNTER — APPOINTMENT (EMERGENCY)
Dept: RADIOLOGY | Facility: HOSPITAL | Age: 74
DRG: 089 | End: 2024-03-31
Payer: MEDICARE

## 2024-03-31 DIAGNOSIS — G93.40 ACUTE ENCEPHALOPATHY: ICD-10-CM

## 2024-03-31 DIAGNOSIS — E53.9 VITAMIN B DEFICIENCY: ICD-10-CM
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
## 2024-03-31 LAB
ALBUMIN SERPL BCP-MCNC: 3.6 G/DL (ref 3.5–5)
ALBUMIN SERPL BCP-MCNC: 3.6 G/DL (ref 3.5–5)
ALP SERPL-CCNC: 82 U/L (ref 34–104)
ALP SERPL-CCNC: 82 U/L (ref 34–104)
ALT SERPL W P-5'-P-CCNC: 23 U/L (ref 7–52)
ALT SERPL W P-5'-P-CCNC: 23 U/L (ref 7–52)
ANION GAP SERPL CALCULATED.3IONS-SCNC: 7 MMOL/L (ref 4–13)
ANION GAP SERPL CALCULATED.3IONS-SCNC: 7 MMOL/L (ref 4–13)
AST SERPL W P-5'-P-CCNC: 25 U/L (ref 13–39)
AST SERPL W P-5'-P-CCNC: 25 U/L (ref 13–39)
BACTERIA BLD CULT: NORMAL
BACTERIA BLD CULT: NORMAL
BASE EXCESS BLDA CALC-SCNC: 4 MMOL/L (ref -2–3)
BASE EXCESS BLDA CALC-SCNC: 4 MMOL/L (ref -2–3)
BASOPHILS # BLD AUTO: 0.13 THOUSANDS/ÂΜL (ref 0–0.1)
BASOPHILS # BLD AUTO: 0.13 THOUSANDS/ÂΜL (ref 0–0.1)
BASOPHILS NFR BLD AUTO: 1 % (ref 0–1)
BASOPHILS NFR BLD AUTO: 1 % (ref 0–1)
BILIRUB SERPL-MCNC: 0.44 MG/DL (ref 0.2–1)
BILIRUB SERPL-MCNC: 0.44 MG/DL (ref 0.2–1)
BUN SERPL-MCNC: 13 MG/DL (ref 5–25)
BUN SERPL-MCNC: 13 MG/DL (ref 5–25)
CA-I BLD-SCNC: 1.17 MMOL/L (ref 1.12–1.32)
CA-I BLD-SCNC: 1.17 MMOL/L (ref 1.12–1.32)
CALCIUM SERPL-MCNC: 9.9 MG/DL (ref 8.4–10.2)
CALCIUM SERPL-MCNC: 9.9 MG/DL (ref 8.4–10.2)
CHLORIDE SERPL-SCNC: 102 MMOL/L (ref 96–108)
CHLORIDE SERPL-SCNC: 102 MMOL/L (ref 96–108)
CO2 SERPL-SCNC: 27 MMOL/L (ref 21–32)
CO2 SERPL-SCNC: 27 MMOL/L (ref 21–32)
CREAT SERPL-MCNC: 0.77 MG/DL (ref 0.6–1.3)
CREAT SERPL-MCNC: 0.77 MG/DL (ref 0.6–1.3)
EOSINOPHIL # BLD AUTO: 0.77 THOUSAND/ÂΜL (ref 0–0.61)
EOSINOPHIL # BLD AUTO: 0.77 THOUSAND/ÂΜL (ref 0–0.61)
EOSINOPHIL NFR BLD AUTO: 5 % (ref 0–6)
EOSINOPHIL NFR BLD AUTO: 5 % (ref 0–6)
ERYTHROCYTE [DISTWIDTH] IN BLOOD BY AUTOMATED COUNT: 14.9 % (ref 11.6–15.1)
ERYTHROCYTE [DISTWIDTH] IN BLOOD BY AUTOMATED COUNT: 14.9 % (ref 11.6–15.1)
GFR SERPL CREATININE-BSD FRML MDRD: 89 ML/MIN/1.73SQ M
GFR SERPL CREATININE-BSD FRML MDRD: 89 ML/MIN/1.73SQ M
GLUCOSE SERPL-MCNC: 122 MG/DL (ref 65–140)
GLUCOSE SERPL-MCNC: 122 MG/DL (ref 65–140)
GLUCOSE SERPL-MCNC: 127 MG/DL (ref 65–140)
GLUCOSE SERPL-MCNC: 127 MG/DL (ref 65–140)
HCO3 BLDA-SCNC: 29.4 MMOL/L (ref 24–30)
HCO3 BLDA-SCNC: 29.4 MMOL/L (ref 24–30)
HCT VFR BLD AUTO: 44.7 % (ref 36.5–49.3)
HCT VFR BLD AUTO: 44.7 % (ref 36.5–49.3)
HCT VFR BLD CALC: 44 % (ref 36.5–49.3)
HCT VFR BLD CALC: 44 % (ref 36.5–49.3)
HGB BLD-MCNC: 14.6 G/DL (ref 12–17)
HGB BLD-MCNC: 14.6 G/DL (ref 12–17)
HGB BLDA-MCNC: 15 G/DL (ref 12–17)
HGB BLDA-MCNC: 15 G/DL (ref 12–17)
IMM GRANULOCYTES # BLD AUTO: 0.08 THOUSAND/UL (ref 0–0.2)
IMM GRANULOCYTES # BLD AUTO: 0.08 THOUSAND/UL (ref 0–0.2)
IMM GRANULOCYTES NFR BLD AUTO: 1 % (ref 0–2)
IMM GRANULOCYTES NFR BLD AUTO: 1 % (ref 0–2)
LYMPHOCYTES # BLD AUTO: 3.7 THOUSANDS/ÂΜL (ref 0.6–4.47)
LYMPHOCYTES # BLD AUTO: 3.7 THOUSANDS/ÂΜL (ref 0.6–4.47)
LYMPHOCYTES NFR BLD AUTO: 24 % (ref 14–44)
LYMPHOCYTES NFR BLD AUTO: 24 % (ref 14–44)
MCH RBC QN AUTO: 29.7 PG (ref 26.8–34.3)
MCH RBC QN AUTO: 29.7 PG (ref 26.8–34.3)
MCHC RBC AUTO-ENTMCNC: 32.7 G/DL (ref 31.4–37.4)
MCHC RBC AUTO-ENTMCNC: 32.7 G/DL (ref 31.4–37.4)
MCV RBC AUTO: 91 FL (ref 82–98)
MCV RBC AUTO: 91 FL (ref 82–98)
MONOCYTES # BLD AUTO: 1.03 THOUSAND/ÂΜL (ref 0.17–1.22)
MONOCYTES # BLD AUTO: 1.03 THOUSAND/ÂΜL (ref 0.17–1.22)
MONOCYTES NFR BLD AUTO: 7 % (ref 4–12)
MONOCYTES NFR BLD AUTO: 7 % (ref 4–12)
NEUTROPHILS # BLD AUTO: 9.7 THOUSANDS/ÂΜL (ref 1.85–7.62)
NEUTROPHILS # BLD AUTO: 9.7 THOUSANDS/ÂΜL (ref 1.85–7.62)
NEUTS SEG NFR BLD AUTO: 62 % (ref 43–75)
NEUTS SEG NFR BLD AUTO: 62 % (ref 43–75)
NRBC BLD AUTO-RTO: 0 /100 WBCS
NRBC BLD AUTO-RTO: 0 /100 WBCS
PCO2 BLD: 31 MMOL/L (ref 21–32)
PCO2 BLD: 31 MMOL/L (ref 21–32)
PCO2 BLD: 44.1 MM HG (ref 42–50)
PCO2 BLD: 44.1 MM HG (ref 42–50)
PH BLD: 7.43 [PH] (ref 7.3–7.4)
PH BLD: 7.43 [PH] (ref 7.3–7.4)
PLATELET # BLD AUTO: 444 THOUSANDS/UL (ref 149–390)
PLATELET # BLD AUTO: 444 THOUSANDS/UL (ref 149–390)
PMV BLD AUTO: 9.1 FL (ref 8.9–12.7)
PMV BLD AUTO: 9.1 FL (ref 8.9–12.7)
PO2 BLD: 19 MM HG (ref 35–45)
PO2 BLD: 19 MM HG (ref 35–45)
POTASSIUM BLD-SCNC: >8 MMOL/L (ref 3.5–5.3)
POTASSIUM BLD-SCNC: >8 MMOL/L (ref 3.5–5.3)
POTASSIUM SERPL-SCNC: 5.3 MMOL/L (ref 3.5–5.3)
POTASSIUM SERPL-SCNC: 5.3 MMOL/L (ref 3.5–5.3)
PROT SERPL-MCNC: 7.5 G/DL (ref 6.4–8.4)
PROT SERPL-MCNC: 7.5 G/DL (ref 6.4–8.4)
RBC # BLD AUTO: 4.92 MILLION/UL (ref 3.88–5.62)
RBC # BLD AUTO: 4.92 MILLION/UL (ref 3.88–5.62)
SAO2 % BLD FROM PO2: 30 % (ref 60–85)
SAO2 % BLD FROM PO2: 30 % (ref 60–85)
SODIUM BLD-SCNC: 133 MMOL/L (ref 136–145)
SODIUM BLD-SCNC: 133 MMOL/L (ref 136–145)
SODIUM SERPL-SCNC: 136 MMOL/L (ref 135–147)
SODIUM SERPL-SCNC: 136 MMOL/L (ref 135–147)
SPECIMEN SOURCE: ABNORMAL
SPECIMEN SOURCE: ABNORMAL
WBC # BLD AUTO: 15.41 THOUSAND/UL (ref 4.31–10.16)
WBC # BLD AUTO: 15.41 THOUSAND/UL (ref 4.31–10.16)

## 2024-03-31 PROCEDURE — 80053 COMPREHEN METABOLIC PANEL: CPT | Performed by: STUDENT IN AN ORGANIZED HEALTH CARE EDUCATION/TRAINING PROGRAM

## 2024-03-31 PROCEDURE — 84295 ASSAY OF SERUM SODIUM: CPT

## 2024-03-31 PROCEDURE — 90715 TDAP VACCINE 7 YRS/> IM: CPT | Performed by: STUDENT IN AN ORGANIZED HEALTH CARE EDUCATION/TRAINING PROGRAM

## 2024-03-31 PROCEDURE — 82803 BLOOD GASES ANY COMBINATION: CPT

## 2024-03-31 PROCEDURE — 71045 X-RAY EXAM CHEST 1 VIEW: CPT

## 2024-03-31 PROCEDURE — 36415 COLL VENOUS BLD VENIPUNCTURE: CPT | Performed by: STUDENT IN AN ORGANIZED HEALTH CARE EDUCATION/TRAINING PROGRAM

## 2024-03-31 PROCEDURE — 82330 ASSAY OF CALCIUM: CPT

## 2024-03-31 PROCEDURE — 85025 COMPLETE CBC W/AUTO DIFF WBC: CPT | Performed by: STUDENT IN AN ORGANIZED HEALTH CARE EDUCATION/TRAINING PROGRAM

## 2024-03-31 PROCEDURE — 70450 CT HEAD/BRAIN W/O DYE: CPT

## 2024-03-31 PROCEDURE — 93005 ELECTROCARDIOGRAM TRACING: CPT

## 2024-03-31 PROCEDURE — 96374 THER/PROPH/DIAG INJ IV PUSH: CPT

## 2024-03-31 PROCEDURE — 84132 ASSAY OF SERUM POTASSIUM: CPT

## 2024-03-31 PROCEDURE — 82947 ASSAY GLUCOSE BLOOD QUANT: CPT

## 2024-03-31 PROCEDURE — 85014 HEMATOCRIT: CPT

## 2024-03-31 PROCEDURE — 72170 X-RAY EXAM OF PELVIS: CPT

## 2024-03-31 PROCEDURE — 90471 IMMUNIZATION ADMIN: CPT

## 2024-03-31 PROCEDURE — NC001 PR NO CHARGE: Performed by: STUDENT IN AN ORGANIZED HEALTH CARE EDUCATION/TRAINING PROGRAM

## 2024-03-31 PROCEDURE — 72125 CT NECK SPINE W/O DYE: CPT

## 2024-03-31 PROCEDURE — 99285 EMERGENCY DEPT VISIT HI MDM: CPT

## 2024-03-31 RX ORDER — ONDANSETRON 2 MG/ML
1 INJECTION INTRAMUSCULAR; INTRAVENOUS ONCE
Status: COMPLETED | OUTPATIENT
Start: 2024-03-31 | End: 2024-03-31

## 2024-03-31 RX ORDER — ACETAMINOPHEN 10 MG/ML
1000 INJECTION, SOLUTION INTRAVENOUS EVERY 8 HOURS
Status: DISCONTINUED | OUTPATIENT
Start: 2024-03-31 | End: 2024-04-01

## 2024-03-31 RX ORDER — ACETAMINOPHEN 325 MG/1
975 TABLET ORAL EVERY 8 HOURS SCHEDULED
Status: DISCONTINUED | OUTPATIENT
Start: 2024-03-31 | End: 2024-03-31

## 2024-03-31 RX ADMIN — TETANUS TOXOID, REDUCED DIPHTHERIA TOXOID AND ACELLULAR PERTUSSIS VACCINE, ADSORBED 0.5 ML: 5; 2.5; 8; 8; 2.5 SUSPENSION INTRAMUSCULAR at 21:37

## 2024-03-31 RX ADMIN — ACETAMINOPHEN 1000 MG: 10 INJECTION INTRAVENOUS at 23:37

## 2024-04-01 ENCOUNTER — APPOINTMENT (INPATIENT)
Dept: NEUROLOGY | Facility: CLINIC | Age: 74
DRG: 089 | End: 2024-04-01
Payer: MEDICARE

## 2024-04-01 ENCOUNTER — APPOINTMENT (OUTPATIENT)
Dept: RADIOLOGY | Facility: HOSPITAL | Age: 74
DRG: 089 | End: 2024-04-01
Payer: MEDICARE

## 2024-04-01 PROBLEM — G93.40 ACUTE ENCEPHALOPATHY: Status: ACTIVE | Noted: 2024-04-01

## 2024-04-01 PROBLEM — N30.00 ACUTE CYSTITIS WITHOUT HEMATURIA: Status: ACTIVE | Noted: 2024-04-01

## 2024-04-01 PROBLEM — G47.33 OSA ON CPAP: Status: ACTIVE | Noted: 2024-04-01

## 2024-04-01 PROBLEM — G35 MULTIPLE SCLEROSIS (HCC): Status: ACTIVE | Noted: 2024-04-01

## 2024-04-01 PROBLEM — E11.9 TYPE 2 DIABETES MELLITUS WITHOUT COMPLICATION, WITHOUT LONG-TERM CURRENT USE OF INSULIN (HCC): Status: ACTIVE | Noted: 2024-04-01

## 2024-04-01 PROBLEM — I72.5 ANEURYSM OF BASILAR ARTERY (HCC): Status: ACTIVE | Noted: 2024-04-01

## 2024-04-01 PROBLEM — R33.9 URINARY RETENTION: Status: ACTIVE | Noted: 2024-04-01

## 2024-04-01 PROBLEM — I10 PRIMARY HYPERTENSION: Status: ACTIVE | Noted: 2024-04-01

## 2024-04-01 PROBLEM — M54.2 NECK PAIN: Status: ACTIVE | Noted: 2024-04-01

## 2024-04-01 PROBLEM — E53.9 VITAMIN B DEFICIENCY: Status: ACTIVE | Noted: 2024-04-01

## 2024-04-01 PROBLEM — D72.829 LEUKOCYTOSIS: Status: ACTIVE | Noted: 2024-04-01
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
## 2024-04-01 LAB
ABO GROUP BLD: NORMAL
ABO GROUP BLD: NORMAL
ANION GAP SERPL CALCULATED.3IONS-SCNC: 10 MMOL/L (ref 4–13)
ANION GAP SERPL CALCULATED.3IONS-SCNC: 10 MMOL/L (ref 4–13)
ATRIAL RATE: 83 BPM
ATRIAL RATE: 83 BPM
BACTERIA UR QL AUTO: ABNORMAL /HPF
BACTERIA UR QL AUTO: ABNORMAL /HPF
BILIRUB UR QL STRIP: NEGATIVE
BILIRUB UR QL STRIP: NEGATIVE
BUDDING YEAST: PRESENT
BUDDING YEAST: PRESENT
BUN SERPL-MCNC: 12 MG/DL (ref 5–25)
BUN SERPL-MCNC: 12 MG/DL (ref 5–25)
CALCIUM SERPL-MCNC: 9.6 MG/DL (ref 8.4–10.2)
CALCIUM SERPL-MCNC: 9.6 MG/DL (ref 8.4–10.2)
CHLORIDE SERPL-SCNC: 102 MMOL/L (ref 96–108)
CHLORIDE SERPL-SCNC: 102 MMOL/L (ref 96–108)
CLARITY UR: ABNORMAL
CLARITY UR: ABNORMAL
CO2 SERPL-SCNC: 27 MMOL/L (ref 21–32)
CO2 SERPL-SCNC: 27 MMOL/L (ref 21–32)
COLOR UR: YELLOW
COLOR UR: YELLOW
CREAT SERPL-MCNC: 0.77 MG/DL (ref 0.6–1.3)
CREAT SERPL-MCNC: 0.77 MG/DL (ref 0.6–1.3)
ERYTHROCYTE [DISTWIDTH] IN BLOOD BY AUTOMATED COUNT: 14.6 % (ref 11.6–15.1)
ERYTHROCYTE [DISTWIDTH] IN BLOOD BY AUTOMATED COUNT: 14.6 % (ref 11.6–15.1)
FOLATE SERPL-MCNC: >22.3 NG/ML
FOLATE SERPL-MCNC: >22.3 NG/ML
GFR SERPL CREATININE-BSD FRML MDRD: 89 ML/MIN/1.73SQ M
GFR SERPL CREATININE-BSD FRML MDRD: 89 ML/MIN/1.73SQ M
GLUCOSE SERPL-MCNC: 108 MG/DL (ref 65–140)
GLUCOSE SERPL-MCNC: 108 MG/DL (ref 65–140)
GLUCOSE SERPL-MCNC: 111 MG/DL (ref 65–140)
GLUCOSE SERPL-MCNC: 111 MG/DL (ref 65–140)
GLUCOSE SERPL-MCNC: 115 MG/DL (ref 65–140)
GLUCOSE SERPL-MCNC: 115 MG/DL (ref 65–140)
GLUCOSE SERPL-MCNC: 178 MG/DL (ref 65–140)
GLUCOSE SERPL-MCNC: 178 MG/DL (ref 65–140)
GLUCOSE UR STRIP-MCNC: ABNORMAL MG/DL
GLUCOSE UR STRIP-MCNC: ABNORMAL MG/DL
HCT VFR BLD AUTO: 45.4 % (ref 36.5–49.3)
HCT VFR BLD AUTO: 45.4 % (ref 36.5–49.3)
HGB BLD-MCNC: 14.8 G/DL (ref 12–17)
HGB BLD-MCNC: 14.8 G/DL (ref 12–17)
HGB UR QL STRIP.AUTO: ABNORMAL
HGB UR QL STRIP.AUTO: ABNORMAL
KETONES UR STRIP-MCNC: NEGATIVE MG/DL
KETONES UR STRIP-MCNC: NEGATIVE MG/DL
LEUKOCYTE ESTERASE UR QL STRIP: ABNORMAL
LEUKOCYTE ESTERASE UR QL STRIP: ABNORMAL
MCH RBC QN AUTO: 30 PG (ref 26.8–34.3)
MCH RBC QN AUTO: 30 PG (ref 26.8–34.3)
MCHC RBC AUTO-ENTMCNC: 32.6 G/DL (ref 31.4–37.4)
MCHC RBC AUTO-ENTMCNC: 32.6 G/DL (ref 31.4–37.4)
MCV RBC AUTO: 92 FL (ref 82–98)
MCV RBC AUTO: 92 FL (ref 82–98)
MUCOUS THREADS UR QL AUTO: ABNORMAL
MUCOUS THREADS UR QL AUTO: ABNORMAL
NITRITE UR QL STRIP: NEGATIVE
NITRITE UR QL STRIP: NEGATIVE
NON-SQ EPI CELLS URNS QL MICRO: ABNORMAL /HPF
NON-SQ EPI CELLS URNS QL MICRO: ABNORMAL /HPF
P AXIS: 72 DEGREES
P AXIS: 72 DEGREES
PH UR STRIP.AUTO: 6.5 [PH]
PH UR STRIP.AUTO: 6.5 [PH]
PLATELET # BLD AUTO: 394 THOUSANDS/UL (ref 149–390)
PLATELET # BLD AUTO: 394 THOUSANDS/UL (ref 149–390)
PMV BLD AUTO: 8.3 FL (ref 8.9–12.7)
PMV BLD AUTO: 8.3 FL (ref 8.9–12.7)
POTASSIUM SERPL-SCNC: 4.4 MMOL/L (ref 3.5–5.3)
POTASSIUM SERPL-SCNC: 4.4 MMOL/L (ref 3.5–5.3)
PR INTERVAL: 162 MS
PR INTERVAL: 162 MS
PROT UR STRIP-MCNC: ABNORMAL MG/DL
PROT UR STRIP-MCNC: ABNORMAL MG/DL
QRS AXIS: 67 DEGREES
QRS AXIS: 67 DEGREES
QRSD INTERVAL: 90 MS
QRSD INTERVAL: 90 MS
QT INTERVAL: 370 MS
QT INTERVAL: 370 MS
QTC INTERVAL: 434 MS
QTC INTERVAL: 434 MS
RBC # BLD AUTO: 4.93 MILLION/UL (ref 3.88–5.62)
RBC # BLD AUTO: 4.93 MILLION/UL (ref 3.88–5.62)
RBC #/AREA URNS AUTO: ABNORMAL /HPF
RBC #/AREA URNS AUTO: ABNORMAL /HPF
RH BLD: POSITIVE
RH BLD: POSITIVE
SODIUM SERPL-SCNC: 139 MMOL/L (ref 135–147)
SODIUM SERPL-SCNC: 139 MMOL/L (ref 135–147)
SP GR UR STRIP.AUTO: 1.01 (ref 1–1.03)
SP GR UR STRIP.AUTO: 1.01 (ref 1–1.03)
T WAVE AXIS: 51 DEGREES
T WAVE AXIS: 51 DEGREES
TSH SERPL DL<=0.05 MIU/L-ACNC: 2.48 UIU/ML (ref 0.45–4.5)
TSH SERPL DL<=0.05 MIU/L-ACNC: 2.48 UIU/ML (ref 0.45–4.5)
UROBILINOGEN UR STRIP-ACNC: <2 MG/DL
UROBILINOGEN UR STRIP-ACNC: <2 MG/DL
VENTRICULAR RATE: 83 BPM
VENTRICULAR RATE: 83 BPM
VIT B12 SERPL-MCNC: 190 PG/ML (ref 180–914)
VIT B12 SERPL-MCNC: 190 PG/ML (ref 180–914)
WBC # BLD AUTO: 13.5 THOUSAND/UL (ref 4.31–10.16)
WBC # BLD AUTO: 13.5 THOUSAND/UL (ref 4.31–10.16)
WBC #/AREA URNS AUTO: ABNORMAL /HPF
WBC #/AREA URNS AUTO: ABNORMAL /HPF
WBC CLUMPS # UR AUTO: PRESENT /UL
WBC CLUMPS # UR AUTO: PRESENT /UL

## 2024-04-01 PROCEDURE — 36415 COLL VENOUS BLD VENIPUNCTURE: CPT | Performed by: INTERNAL MEDICINE

## 2024-04-01 PROCEDURE — 82948 REAGENT STRIP/BLOOD GLUCOSE: CPT

## 2024-04-01 PROCEDURE — 84443 ASSAY THYROID STIM HORMONE: CPT | Performed by: INTERNAL MEDICINE

## 2024-04-01 PROCEDURE — 99232 SBSQ HOSP IP/OBS MODERATE 35: CPT | Performed by: SURGERY

## 2024-04-01 PROCEDURE — 72141 MRI NECK SPINE W/O DYE: CPT

## 2024-04-01 PROCEDURE — 80048 BASIC METABOLIC PNL TOTAL CA: CPT | Performed by: INTERNAL MEDICINE

## 2024-04-01 PROCEDURE — 99222 1ST HOSP IP/OBS MODERATE 55: CPT | Performed by: NURSE PRACTITIONER

## 2024-04-01 PROCEDURE — 81001 URINALYSIS AUTO W/SCOPE: CPT

## 2024-04-01 PROCEDURE — 99223 1ST HOSP IP/OBS HIGH 75: CPT | Performed by: INTERNAL MEDICINE

## 2024-04-01 PROCEDURE — 93010 ELECTROCARDIOGRAM REPORT: CPT | Performed by: INTERNAL MEDICINE

## 2024-04-01 PROCEDURE — 95816 EEG AWAKE AND DROWSY: CPT

## 2024-04-01 PROCEDURE — 82746 ASSAY OF FOLIC ACID SERUM: CPT | Performed by: INTERNAL MEDICINE

## 2024-04-01 PROCEDURE — 87081 CULTURE SCREEN ONLY: CPT | Performed by: INTERNAL MEDICINE

## 2024-04-01 PROCEDURE — 85027 COMPLETE CBC AUTOMATED: CPT | Performed by: INTERNAL MEDICINE

## 2024-04-01 PROCEDURE — 95816 EEG AWAKE AND DROWSY: CPT | Performed by: PSYCHIATRY & NEUROLOGY

## 2024-04-01 PROCEDURE — RECHECK: Performed by: NURSE PRACTITIONER

## 2024-04-01 PROCEDURE — 82607 VITAMIN B-12: CPT | Performed by: INTERNAL MEDICINE

## 2024-04-01 RX ORDER — INSULIN LISPRO 100 [IU]/ML
1-5 INJECTION, SOLUTION INTRAVENOUS; SUBCUTANEOUS
Status: DISCONTINUED | OUTPATIENT
Start: 2024-04-01 | End: 2024-04-02 | Stop reason: HOSPADM

## 2024-04-01 RX ORDER — GABAPENTIN 300 MG/1
600 CAPSULE ORAL
Status: ON HOLD | COMMUNITY
End: 2024-04-12 | Stop reason: SDUPTHER

## 2024-04-01 RX ORDER — DULAGLUTIDE 0.75 MG/.5ML
0.75 INJECTION, SOLUTION SUBCUTANEOUS
Status: ON HOLD | COMMUNITY
End: 2024-04-12

## 2024-04-01 RX ORDER — BISACODYL 10 MG
10 SUPPOSITORY, RECTAL RECTAL DAILY PRN
Status: ON HOLD | COMMUNITY

## 2024-04-01 RX ORDER — AMLODIPINE BESYLATE 10 MG/1
10 TABLET ORAL DAILY
Status: DISCONTINUED | OUTPATIENT
Start: 2024-04-01 | End: 2024-04-02 | Stop reason: HOSPADM

## 2024-04-01 RX ORDER — CEPHALEXIN 500 MG/1
500 CAPSULE ORAL EVERY 6 HOURS SCHEDULED
COMMUNITY
End: 2024-04-02

## 2024-04-01 RX ORDER — ACETAMINOPHEN 325 MG/1
975 TABLET ORAL EVERY 8 HOURS SCHEDULED
Status: DISCONTINUED | OUTPATIENT
Start: 2024-04-01 | End: 2024-04-02 | Stop reason: HOSPADM

## 2024-04-01 RX ORDER — PROPRANOLOL HCL 60 MG
60 CAPSULE, EXTENDED RELEASE 24HR ORAL DAILY
Status: DISCONTINUED | OUTPATIENT
Start: 2024-04-01 | End: 2024-04-02 | Stop reason: HOSPADM

## 2024-04-01 RX ORDER — BISACODYL 10 MG
10 SUPPOSITORY, RECTAL RECTAL DAILY PRN
Status: DISCONTINUED | OUTPATIENT
Start: 2024-04-01 | End: 2024-04-02 | Stop reason: HOSPADM

## 2024-04-01 RX ORDER — GABAPENTIN 300 MG/1
300 CAPSULE ORAL 2 TIMES DAILY
Status: ON HOLD | COMMUNITY
End: 2024-04-12 | Stop reason: SDUPTHER

## 2024-04-01 RX ORDER — CLOPIDOGREL BISULFATE 75 MG/1
75 TABLET ORAL DAILY
Status: ON HOLD | COMMUNITY
End: 2024-04-12 | Stop reason: SDUPTHER

## 2024-04-01 RX ORDER — SERTRALINE HYDROCHLORIDE 25 MG/1
25 TABLET, FILM COATED ORAL DAILY
Status: DISCONTINUED | OUTPATIENT
Start: 2024-04-01 | End: 2024-04-02 | Stop reason: HOSPADM

## 2024-04-01 RX ORDER — AMLODIPINE BESYLATE AND ATORVASTATIN CALCIUM 10; 80 MG/1; MG/1
1 TABLET, FILM COATED ORAL DAILY
Status: ON HOLD | COMMUNITY

## 2024-04-01 RX ORDER — ONDANSETRON 2 MG/ML
4 INJECTION INTRAMUSCULAR; INTRAVENOUS EVERY 6 HOURS PRN
Status: DISCONTINUED | OUTPATIENT
Start: 2024-04-01 | End: 2024-04-02 | Stop reason: HOSPADM

## 2024-04-01 RX ORDER — ACETAMINOPHEN 325 MG/1
650 TABLET ORAL EVERY 6 HOURS PRN
Status: DISCONTINUED | OUTPATIENT
Start: 2024-04-01 | End: 2024-04-01

## 2024-04-01 RX ORDER — ENOXAPARIN SODIUM 100 MG/ML
40 INJECTION SUBCUTANEOUS DAILY
Status: DISCONTINUED | OUTPATIENT
Start: 2024-04-01 | End: 2024-04-02 | Stop reason: HOSPADM

## 2024-04-01 RX ORDER — CLOPIDOGREL BISULFATE 75 MG/1
75 TABLET ORAL DAILY
Status: DISCONTINUED | OUTPATIENT
Start: 2024-04-01 | End: 2024-04-02 | Stop reason: HOSPADM

## 2024-04-01 RX ORDER — PROPRANOLOL HCL 60 MG
60 CAPSULE, EXTENDED RELEASE 24HR ORAL DAILY
Status: ON HOLD | COMMUNITY

## 2024-04-01 RX ORDER — GABAPENTIN 300 MG/1
600 CAPSULE ORAL
Status: DISCONTINUED | OUTPATIENT
Start: 2024-04-01 | End: 2024-04-02 | Stop reason: HOSPADM

## 2024-04-01 RX ORDER — LATANOPROST 50 UG/ML
1 SOLUTION/ DROPS OPHTHALMIC
Status: DISCONTINUED | OUTPATIENT
Start: 2024-04-01 | End: 2024-04-02 | Stop reason: HOSPADM

## 2024-04-01 RX ORDER — ATORVASTATIN CALCIUM 80 MG/1
80 TABLET, FILM COATED ORAL DAILY
Status: DISCONTINUED | OUTPATIENT
Start: 2024-04-01 | End: 2024-04-02 | Stop reason: HOSPADM

## 2024-04-01 RX ORDER — PANTOPRAZOLE SODIUM 40 MG/1
40 TABLET, DELAYED RELEASE ORAL 2 TIMES DAILY
Status: DISCONTINUED | OUTPATIENT
Start: 2024-04-01 | End: 2024-04-02 | Stop reason: HOSPADM

## 2024-04-01 RX ORDER — SERTRALINE HYDROCHLORIDE 25 MG/1
25 TABLET, FILM COATED ORAL DAILY
Status: ON HOLD | COMMUNITY
End: 2024-04-12 | Stop reason: ALTCHOICE

## 2024-04-01 RX ORDER — LIDOCAINE 50 MG/G
1 PATCH TOPICAL DAILY
Status: DISCONTINUED | OUTPATIENT
Start: 2024-04-01 | End: 2024-04-02 | Stop reason: HOSPADM

## 2024-04-01 RX ORDER — PANTOPRAZOLE SODIUM 40 MG/1
40 TABLET, DELAYED RELEASE ORAL DAILY
Status: ON HOLD | COMMUNITY

## 2024-04-01 RX ORDER — LATANOPROST 50 UG/ML
1 SOLUTION/ DROPS OPHTHALMIC
Status: ON HOLD | COMMUNITY

## 2024-04-01 RX ORDER — GABAPENTIN 300 MG/1
300 CAPSULE ORAL 2 TIMES DAILY
Status: DISCONTINUED | OUTPATIENT
Start: 2024-04-01 | End: 2024-04-02 | Stop reason: HOSPADM

## 2024-04-01 RX ORDER — CEPHALEXIN 500 MG/1
500 CAPSULE ORAL EVERY 6 HOURS SCHEDULED
Status: DISCONTINUED | OUTPATIENT
Start: 2024-04-01 | End: 2024-04-01

## 2024-04-01 RX ADMIN — ACETAMINOPHEN 975 MG: 325 TABLET, FILM COATED ORAL at 14:43

## 2024-04-01 RX ADMIN — LIDOCAINE 1 PATCH: 50 PATCH CUTANEOUS at 14:43

## 2024-04-01 RX ADMIN — LATANOPROST 1 DROP: 50 SOLUTION OPHTHALMIC at 21:10

## 2024-04-01 RX ADMIN — CYANOCOBALAMIN TAB 500 MCG 500 MCG: 500 TAB at 14:43

## 2024-04-01 RX ADMIN — GABAPENTIN 300 MG: 300 CAPSULE ORAL at 09:21

## 2024-04-01 RX ADMIN — PANTOPRAZOLE SODIUM 40 MG: 40 TABLET, DELAYED RELEASE ORAL at 18:54

## 2024-04-01 RX ADMIN — AMLODIPINE BESYLATE 10 MG: 10 TABLET ORAL at 09:21

## 2024-04-01 RX ADMIN — ACETAMINOPHEN 975 MG: 325 TABLET, FILM COATED ORAL at 21:09

## 2024-04-01 RX ADMIN — SERTRALINE HYDROCHLORIDE 25 MG: 25 TABLET ORAL at 09:21

## 2024-04-01 RX ADMIN — GABAPENTIN 300 MG: 300 CAPSULE ORAL at 18:54

## 2024-04-01 RX ADMIN — INSULIN LISPRO 1 UNITS: 100 INJECTION, SOLUTION INTRAVENOUS; SUBCUTANEOUS at 18:54

## 2024-04-01 RX ADMIN — GABAPENTIN 600 MG: 300 CAPSULE ORAL at 21:08

## 2024-04-01 RX ADMIN — PROPRANOLOL HYDROCHLORIDE 60 MG: 60 CAPSULE, EXTENDED RELEASE ORAL at 14:44

## 2024-04-01 RX ADMIN — CLOPIDOGREL BISULFATE 75 MG: 75 TABLET ORAL at 09:21

## 2024-04-01 RX ADMIN — ENOXAPARIN SODIUM 40 MG: 40 INJECTION SUBCUTANEOUS at 09:22

## 2024-04-01 RX ADMIN — ATORVASTATIN CALCIUM 80 MG: 80 TABLET, FILM COATED ORAL at 09:21

## 2024-04-01 NOTE — ASSESSMENT & PLAN NOTE
Intermittent urgency noted.   Continue home amlodipine 10 mg daily and propranolol 60 mg daily   Monitor

## 2024-04-01 NOTE — ASSESSMENT & PLAN NOTE
WBC 15 on admission, patient otherwise afebrile  No clear source of new infection appreciated, of note patient completed treatment for cystitis 3/31.  UA with sterile pyuria, however patient also with suprapubic catheter  Trending down, monitor off further abx

## 2024-04-01 NOTE — ASSESSMENT & PLAN NOTE
Unwitnessed fall at acute rehab, initially presenting as a level B trauma alert.  Imaging including x-rays, CT head, CT C-spine, and MRI cervical spine all negative for acute traumatic injuries  PT/OT evaluations

## 2024-04-01 NOTE — ASSESSMENT & PLAN NOTE
Patient recently admitted at [facility] 3/26/2024 - 3/29/2024 with sepsis presumed secondary to urinary source, cultures growing Candida  Was discharged on Keflex for planned 5-day treatment course, to be completed today.

## 2024-04-01 NOTE — H&P
"[facility]  H&P  Name: Paul Lewis 73 y.o. male I MRN: [id number]  Unit/Bed#: ED 15 I Date of Admission: 3/31/2024   Date of Service: 2024 I Hospital Day: 0      Assessment and Plan  72 yo M unwitnessed fall on plavix,+ head strike with GCS 14, confused    Plan:  - Recommend medicine evaluation for admission.  - No traumatic injuries identified on imaging or physical exam.         Trauma Alert: Level B   Model of Arrival: Ambulance    Trauma Team: Attending Dr Ullrich and Residents Dr Horvath, Dr. Kumar  Consultants:     None     History of Present Illness     Chief Complaint: \"Everywhere hurts\"  Mechanism: Fall     HPI:    Paul Lewis is a 73 y.o. male who presents s/p unwitnessed fall out of bed on Plavix at his facility. +head strike, unknown LOC. Patient does not recall event. GCS 14 for confusion. CT head negative. CT cervical spine with chronic degenerative changes. Patient initially endorsing cervical spine tenderness and pain with movement. MRI cervical spine obtained without evidence of acute injury. Subsequently patient had improvement in his neck pain. Cervical collar was cleared with radiographs and clinically. Patient remained confused. Leukocytosis to 15. UA not suggestive of UTI.     Review of Systems   Unable to perform ROS: Dementia     12-point, complete review of systems was reviewed and negative except as stated above.     Historical Information     Past Medical History:   Diagnosis Date    Diabetes (HCC)     Hypertension     Multiple sclerosis (HCC)      Past Surgical History:   Procedure Laterality Date    CEREBRAL ANEURYSM REPAIR        Unable to obtain history due to Dementia    Social History     Tobacco Use    Smoking status: Former     Types: Cigarettes     Start date:      Quit date: 1964     Years since quittin.2    Smokeless tobacco: Never   Substance Use Topics    Alcohol use: Not Currently    Drug use: Not Currently "     Immunization History   Administered Date(s) Administered    COVID-19 PFIZER VACCINE 0.3 ML IM 03/30/2021, 04/20/2021, 12/07/2021    Tdap 03/31/2024     Last Tetanus: no active record   Family History: Non-contributory    1. Before the illness or injury that brought you to the Emergency, did you need someone to help you on a regular basis? 1=Yes   2. Since the illness or injury that brought you to the Emergency, have you needed more help than usual to take care of yourself? 1=Yes   3. Have you been hospitalized for one or more nights during the past 6 months (excluding a stay in the Emergency Department)? 1=Yes   4. In general, do you see well? 0=Yes   5. In general, do you have serious problems with your memory? 1=Yes   6. Do you take more than three different medications everyday? 1=Yes   TOTAL   5     Did you order a geriatric consult if the score was 2 or greater?: per primary     Meds/Allergies   PTA meds:   Prior to Admission Medications   Prescriptions Last Dose Informant Patient Reported? Taking?   amLODIPine-atorvastatin (CADUET) 10-80 MG per tablet   Yes Yes   Sig: Take 1 tablet by mouth daily   bisacodyl (DULCOLAX) 10 mg suppository   Yes Yes   Sig: Insert 10 mg into the rectum daily as needed for constipation   cephalexin (KEFLEX) 500 mg capsule   Yes Yes   Sig: Take 500 mg by mouth every 6 (six) hours   clopidogrel (PLAVIX) 75 mg tablet   Yes Yes   Sig: Take 75 mg by mouth daily   dulaglutide (Trulicity) 0.75 MG/0.5ML injection   Yes Yes   Sig: Inject 0.75 mg under the skin every 7 days   gabapentin (NEURONTIN) 300 mg capsule   Yes Yes   Sig: Take 300 mg by mouth 2 (two) times a day   gabapentin (NEURONTIN) 300 mg capsule   Yes Yes   Sig: Take 600 mg by mouth daily at bedtime   latanoprost (XALATAN) 0.005 % ophthalmic solution   Yes Yes   Sig: Administer 1 drop to both eyes daily at bedtime   pantoprazole (PROTONIX) 40 mg tablet   Yes Yes   Sig: Take 40 mg by mouth 2 (two) times a day   propranolol  (INDERAL LA) 60 mg 24 hr capsule   Yes Yes   Sig: Take 60 mg by mouth daily   sertraline (ZOLOFT) 25 mg tablet   Yes Yes   Sig: Take 25 mg by mouth daily      Facility-Administered Medications: None     Allergies have not been reviewed;    Allergies   Allergen Reactions    Cephalexin Rash       Objective   Initial Vitals:   Temperature: 98.3 °F (36.8 °C) (03/31/24 2111)  Pulse: 89 (03/31/24 2111)  Respirations: 18 (03/31/24 2111)  Blood Pressure: 169/80 (03/31/24 2111)    Primary Survey:   Airway:        Status: patent;        Pre-hospital Interventions: none        Hospital Interventions: none  Breathing:        Pre-hospital Interventions: none              Right breath sounds: normal       Left breath sounds: normal  Circulation:        Rhythm: regular       Rate: regular   Right Pulses Left Pulses      R femoral: 2+  R pedal: 2+  R carotid: 2+     L femoral: 2+  L pedal: 2+  L carotid: 2+     Disability:        GCS: Eye: 4; Verbal: 4 Motor: 6 Total: 14       Right Pupil: round;  reactive         Left Pupil:  round;  reactive      R Motor Strength L Motor Strength    R : 5/5  R dorsiflex: 5/5  R plantarflex: 5/5 L : 5/5  L dorsiflex: 5/5  L plantarflex: 5/5        Sensory:  No sensory deficit  Exposure:       Completed: Yes (pt reports tenderness along lower lumbar spine, tenderness along L thigh)      Secondary Survey:  Physical Exam  HENT:      Head: Normocephalic.      Comments: Laceration to the L forehead     Right Ear: External ear normal.      Left Ear: External ear normal.      Mouth/Throat:      Mouth: Mucous membranes are dry.   Eyes:      Extraocular Movements: Extraocular movements intact.      Pupils: Pupils are equal, round, and reactive to light.   Neck:      Comments: C-collar in place  Cardiovascular:      Rate and Rhythm: Normal rate.   Pulmonary:      Effort: Pulmonary effort is normal.   Abdominal:      General: Abdomen is flat. There is no distension.      Palpations: Abdomen is soft.       Tenderness: There is no abdominal tenderness.      Comments: R paramedian surgical scar, chronic suprapubic catheter   Musculoskeletal:      Cervical back: Tenderness (cervical) present.      Comments: Pt reports b/l lower extremity tenderness, tenderness along L foot, tenderness along lumbar midline   Skin:     Comments: Cool skin   Neurological:      Mental Status: He is alert. Mental status is at baseline. He is disoriented.         Invasive Devices       Peripheral Intravenous Line  Duration             Peripheral IV 03/31/24 Left Antecubital <1 day    Peripheral IV 04/01/24 Right Antecubital <1 day                  Lab Results: I have personally reviewed all pertinent laboratory/test results from 04/01/24, including the preceding 24 hours.  Recent Labs     03/31/24  2119 03/31/24  2120 03/31/24  2305 04/01/24  0458   WBC  --    < >  --  13.50*   HGB 15.0   < >  --  14.8   HCT 44   < >  --  45.4   PLT  --    < >  --  394*   SODIUM  --   --  136  --    K  --   --  5.3  --    CL  --   --  102  --    CO2 31  --  27  --    BUN  --   --  13  --    CREATININE  --   --  0.77  --    GLUC  --   --  122  --    CAIONIZED 1.17  --   --   --    AST  --   --  25  --    ALT  --   --  23  --    ALB  --   --  3.6  --    TBILI  --   --  0.44  --    ALKPHOS  --   --  82  --     < > = values in this interval not displayed.       Imaging Results: I have personally reviewed pertinent images saved in PACS. CT scan findings (and other pertinent positive findings on images) were discussed with radiology. My interpretation of the images/reports are as follows:  Chest Xray(s): negative for acute findings   FAST exam(s): negative for acute findings   CT Scan(s): negative for acute findings   Additional Xray(s): pending     Other Studies: pending    Code Status: Level 1 - Full Code  Advance Directive and Living Will:      Power of :    POLST:      Pablo Kumar MD  General Surgery   04/01/24

## 2024-04-01 NOTE — ASSESSMENT & PLAN NOTE
Hold home Trulicity, continue correctional insulin coverage while admitted  Carb controlled diet  hypoglycemia protocol

## 2024-04-01 NOTE — CONSULTS
Consultation - Geriatrics   Paul Lewis 73 y.o. male MRN: [id number]  [facility] Encounter: [id number]      Assessment/Plan    Ambulatory dysfunction  Exacerbated by fall  At risk for falls secondary to age, hx of fall, gait instability, polypharmacy, visual impairment, MS  Fall precautions  PT/OT  Increased physical exercise, recommend balance and strengthening exercises  Rehab post hospitalization     Neck pain  S/p fall  CT imaging negative acute injury  Scheduled acetaminophen  Lidoderm patch  Aqua K    Suprapubic catheter secondary to neurogenic bladder  Upsized in [place] 3/28/24  Follow up with urology as outpatient    Frailty  Clinical Frail Scale: 6- Moderately Frail  Need help with all outside activities  Need help with stairs and bathing  May need assistance with dressing   Was at rehab, previously at [facility]  Albumin 3.6, maintain protein in diet  PT/OT  Continue supportive care     Cognitive screening  No reported prior memory issues  TSH 2.475 (4/1/24)  Vitamin B 12 level 190 (4/1/24) recommend vitamin B 12 supplementation, goal level greater than 400  CT head (3/31/24) mild microangiopathic changes  Keep physically, mentally and socially active     Delirium precautions  Baseline: alert and oriented x 3  Provide frequent redirection, reorientation, distraction techniques  Avoid deliriogenic medications such as tramadol, benzodiazepines, anticholinergics,  Benadryl  Treat pain, See geriatric pain protocol  Monitor for constipation and urinary retention  Encourage early and frequent moblization, OOB  Encourage Hydration/ Nutrition  Implement sleep hygiene, limit night time interuptions, group activities     Insomnia  Related to hospitalization  First line is behavioral therapy  Avoid sedative hypnotics such as benzodiazepines and benadryl  Encourage staying awake during the day  Encourage daytime activity, morning exercise  Decrease or eliminate day time naps  Avoid caffeine especially  during late afternoon and evening hours  Establish a night time routine    Advanced Care Planning  Full code    Home medication reviews  Spoke with facility to fax medication list- [phone]              History of Present Illness   Physician Requesting Consult: Jose Harden DO  Reason for Consult / Principal Problem: fall, delirium  Hx and PE limited by: NA  HPI: Paul Lewis is a 73 y.o. year old male who presents with fall with head strike at rehab. He was at [facility] rehab post hospitalization when he fell and hit his head. He was seen by ash MATTSON greater than 2.     He has suprbupic catheter for urinary retention, DM2, HTN, MS    He is enrolled at [facility]. He needs assistance with IADL and ADLs He has prior falls. He reports prior to rehab he lives with his brother and sister. His sisters helps a lot but she also has esophageal cancer and its been more difficult.     Upon exam pt lying in bed, he is alert and oriented x 3.     Rounded with nursing, pt had more confusion this am, he is now better.     Inpatient consult to Gerontology  Consult performed by: LUIS A Johnson  Consult ordered by: LUIS A Gama          Review of Systems   Constitutional:  Positive for unexpected weight change.   HENT:  Negative for hearing loss.    Eyes:  Negative for visual disturbance.   Respiratory:  Negative for cough.    Cardiovascular:  Negative for chest pain.   Gastrointestinal:  Negative for constipation.   Endocrine: Negative for polyuria.   Musculoskeletal:  Negative for gait problem.   Skin:  Negative for color change.   Neurological:  Negative for weakness.   Psychiatric/Behavioral:  Negative for sleep disturbance.        Historical Information   Past Medical History:   Diagnosis Date    Diabetes (HCC)     Hypertension     Multiple sclerosis (HCC)      Past Surgical History:   Procedure Laterality Date    CEREBRAL ANEURYSM REPAIR       Social History   Social History     Substance and  Sexual Activity   Alcohol Use Not Currently     Social History     Substance and Sexual Activity   Drug Use Not Currently     Social History     Tobacco Use   Smoking Status Former    Types: Cigarettes    Start date:     Quit date: 1964    Years since quittin.2   Smokeless Tobacco Never         Family History:   Family History   Problem Relation Age of Onset    Heart attack Mother     Stroke Mother     Heart attack Father     Aneurysm Father        Meds/Allergies   Current meds:   Current Facility-Administered Medications   Medication Dose Route Frequency    acetaminophen (TYLENOL) tablet 975 mg  975 mg Oral Q8H LEE    amLODIPine (NORVASC) tablet 10 mg  10 mg Oral Daily    And    atorvastatin (LIPITOR) tablet 80 mg  80 mg Oral Daily    bisacodyl (DULCOLAX) rectal suppository 10 mg  10 mg Rectal Daily PRN    clopidogrel (PLAVIX) tablet 75 mg  75 mg Oral Daily    cyanocobalamin (VITAMIN B-12) tablet 500 mcg  500 mcg Oral Daily    enoxaparin (LOVENOX) subcutaneous injection 40 mg  40 mg Subcutaneous Daily    gabapentin (NEURONTIN) capsule 300 mg  300 mg Oral BID    gabapentin (NEURONTIN) capsule 600 mg  600 mg Oral HS    insulin lispro (HumALOG/ADMELOG) 100 units/mL subcutaneous injection 1-5 Units  1-5 Units Subcutaneous TID AC    insulin lispro (HumALOG/ADMELOG) 100 units/mL subcutaneous injection 1-5 Units  1-5 Units Subcutaneous HS    latanoprost (XALATAN) 0.005 % ophthalmic solution 1 drop  1 drop Both Eyes HS    lidocaine (LIDODERM) 5 % patch 1 patch  1 patch Topical Daily    ondansetron (ZOFRAN) injection 4 mg  4 mg Intravenous Q6H PRN    pantoprazole (PROTONIX) EC tablet 40 mg  40 mg Oral BID    propranolol (INDERAL LA) 24 hr capsule 60 mg  60 mg Oral Daily    sertraline (ZOLOFT) tablet 25 mg  25 mg Oral Daily      Current PTA meds:  Medications Prior to Admission   Medication    amLODIPine-atorvastatin (CADUET) 10-80 MG per tablet    bisacodyl (DULCOLAX) 10 mg suppository    cephalexin (KEFLEX) 500 mg  "capsule    clopidogrel (PLAVIX) 75 mg tablet    dulaglutide (Trulicity) 0.75 MG/0.5ML injection    gabapentin (NEURONTIN) 300 mg capsule    gabapentin (NEURONTIN) 300 mg capsule    latanoprost (XALATAN) 0.005 % ophthalmic solution    pantoprazole (PROTONIX) 40 mg tablet    propranolol (INDERAL LA) 60 mg 24 hr capsule    sertraline (ZOLOFT) 25 mg tablet        Allergies   Allergen Reactions    Cephalexin Rash       Objective   Vitals: Blood pressure 126/54, pulse 83, temperature 98.6 °F (37 °C), temperature source Oral, resp. rate 16, height 5' 7\" (1.702 m), weight 69.3 kg (152 lb 12.5 oz), SpO2 96%.,Body mass index is 23.93 kg/m².      Physical Exam  Vitals and nursing note reviewed.   HENT:      Head: Normocephalic.      Nose: No congestion.      Mouth/Throat:      Mouth: Mucous membranes are moist.   Eyes:      General:         Right eye: No discharge.         Left eye: No discharge.   Cardiovascular:      Rate and Rhythm: Normal rate and regular rhythm.      Pulses: Normal pulses.   Pulmonary:      Effort: Pulmonary effort is normal.      Breath sounds: Normal breath sounds.   Abdominal:      General: Bowel sounds are normal.      Palpations: Abdomen is soft.   Musculoskeletal:         General: Normal range of motion.      Cervical back: Normal range of motion.   Skin:     General: Skin is warm and dry.   Neurological:      Mental Status: He is alert and oriented to person, place, and time. Mental status is at baseline.   Psychiatric:         Mood and Affect: Mood normal.         Lab Results:   Results from last 7 days   Lab Units 04/01/24  0458   WBC Thousand/uL 13.50*   HEMOGLOBIN g/dL 14.8   HEMATOCRIT % 45.4   PLATELETS Thousands/uL 394*        Results from last 7 days   Lab Units 04/01/24  0609 03/31/24  2305 03/31/24  2305 03/31/24  2119   POTASSIUM mmol/L 4.4   < > 5.3  --    CHLORIDE mmol/L 102   < > 102  --    CO2 mmol/L 27   < > 27  --    CO2, I-STAT mmol/L  --   --   --  31   BUN mg/dL 12   < > 13  --  "   CREATININE mg/dL 0.77   < > 0.77  --    CALCIUM mg/dL 9.6   < > 9.9  --    ALK PHOS U/L  --   --  82  --    ALT U/L  --   --  23  --    AST U/L  --   --  25  --    GLUCOSE, ISTAT mg/dl  --   --   --  127    < > = values in this interval not displayed.       Imaging Studies: I have personally reviewed pertinent reports.    EKG, Pathology, and Other Studies: I have personally reviewed pertinent reports.    VTE Prophylaxis: Sequential compression device (Venodyne)     Code Status: Level 1 - Full Code

## 2024-04-01 NOTE — PROGRESS NOTES
Pastoral Care Progress Note    2024  Patient: Paul Lewis : 1950  Admission Date & Time: 3/31/2024 2109  MRN: [id number] CSN: [id number]         response found no family present and pt unavailable.  remains available.                24 0800   Clinical Encounter Type   Visited With Patient   Crisis Visit Trauma

## 2024-04-01 NOTE — ASSESSMENT & PLAN NOTE
Presenting as trauma alert level B after unwitnessed fall, noted markedly confused and frequently asking where he is and where family is, remainder of examination otherwise nonfocal  Reportedly at baseline patient is somewhat confused but not to this extent  CT head negative for acute traumatic injuries or acute intracranial pathology, labs with leukocytosis but otherwise without acute abnormality to explain symptomatology.  UA with innumerable RBCs/WBC without bacteria, and is completing treatment for acute cystitis  Uncertain etiology, consideration for concussion however given repetitive nature/degree of confusion favor ensuring no other etiology  Obtain routine EEG, TSH/B12/folate.  Monitor mental status closely.  Further workup pending these results particularly if patient's mental status should not improve

## 2024-04-01 NOTE — PROGRESS NOTES
[facility]  Progress Note  Name: Paul Lewis I  MRN: [id number]  Unit/Bed#: [facility] 904-01 I Date of Admission: 3/31/2024   Date of Service: 4/1/2024 I Hospital Day: 0    Assessment/Plan   * Acute encephalopathy  Assessment & Plan  Presents from rehab as trauma alert level B after unwitnessed fall, noted markedly confused requiring frequent re-orientation. Does have degree of confusion/forgetfulness at baseline.   CTH negative for acute traumatic injuries or acute intracranial pathology, labs with leukocytosis but otherwise without acute abnormality to explain symptomatology.  UA with innumerable RBCs/WBC without bacteria, and has completed treatment for acute cystitis  Uncertain etiology, consideration for concussion however given repetitive nature/degree of confusion favor ensuring no other etiology  Routine EEG pending. TSH/folate WNL. B12 low normal, will start supplementation.   Consult geriatrics   Monitor mental status closely.    Further workup pending EEG/clinical course   PT/OT evaluations     Neck pain  Assessment & Plan  S/p recent fall.   CT Imaging negative  Add ATC APAP, lido patch, aqua K.     Fall  Assessment & Plan  Unwitnessed fall at acute rehab, initially presenting as a level B trauma alert.  Imaging including x-rays, CT head, CT C-spine, and MRI cervical spine all negative for acute traumatic injuries  PT/OT evaluations    Leukocytosis  Assessment & Plan  WBC 15 on admission, patient otherwise afebrile  No clear source of new infection appreciated, of note patient completed treatment for cystitis 3/31.  UA with sterile pyuria, however patient also with suprapubic catheter  Trending down, monitor off further abx    Urinary retention  Assessment & Plan  History of suprapubic catheter in setting of neurogenic bladder, s/p upsizing during admission at [place] 3/28/2024  Continue routine catheter care    Vitamin B deficiency  Assessment & Plan  B12 192,  goal > 300.  Started on supplementation    Type 2 diabetes mellitus without complication, without long-term current use of insulin (HCC)  Assessment & Plan    Hold home Trulicity, continue correctional insulin coverage while admitted  Carb controlled diet  hypoglycemia protocol      Primary hypertension  Assessment & Plan  Intermittent urgency noted.   Continue home amlodipine 10 mg daily and propranolol 60 mg daily   Monitor     HONEY on CPAP  Assessment & Plan  Continue nightly CPAP    Multiple sclerosis (HCC)  Assessment & Plan  Noted in history, continue outpatient follow-up with neurology as appropriate    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HCC)  Assessment & Plan  S/p prior stenting   Continue Plavix and blood pressure control             VTE Pharmacologic Prophylaxis: VTE Score: 4 Moderate Risk (Score 3-4) - Pharmacological DVT Prophylaxis Ordered: enoxaparin (Lovenox).    Mobility:      -HLM Goal NOT achieved. Continue with multidisciplinary rounding and encourage appropriate mobility to improve upon -HLM goals.    Patient Centered Rounds: I performed bedside rounds with nursing staff today.   Discussions with Specialists or Other Care Team Provider: nursing, case management     Education and Discussions with Family / Patient: Updated  (brother) via phone.    Total Time Spent on Date of Encounter in care of patient: 40 mins. This time was spent on one or more of the following: performing physical exam; counseling and coordination of care; obtaining or reviewing history; documenting in the medical record; reviewing/ordering tests, medications or procedures; communicating with other healthcare professionals and discussing with patient's family/caregivers.    Current Length of Stay: 0 day(s)  Current Patient Status: Inpatient     Certification Statement: The patient will continue to require additional inpatient hospital stay due to encephalopathy workup  Discharge Plan: Anticipate discharge in 48-72  hrs to discharge location to be determined pending rehab evaluations.    Code Status: Level 1 - Full Code    Subjective:   Patient is confused. He is able to state his first name and that he is at [facility]. Unable to state year or month. States that he know he had a fall and complains of a HA and neck pain.     Objective:     Vitals:   Temp (24hrs), Av °F (36.7 °C), Min:97.6 °F (36.4 °C), Max:98.3 °F (36.8 °C)    Temp:  [97.6 °F (36.4 °C)-98.3 °F (36.8 °C)] 97.6 °F (36.4 °C)  HR:  [60-89] 76  Resp:  [12-21] 16  BP: (137-176)/() 137/61  SpO2:  [94 %-99 %] 96 %  Body mass index is 23.93 kg/m².     Input and Output Summary (last 24 hours):     Intake/Output Summary (Last 24 hours) at 2024 1012  Last data filed at 2024 0901  Gross per 24 hour   Intake 100 ml   Output 1425 ml   Net -1325 ml       Physical Exam:   Physical Exam  Vitals and nursing note reviewed.   Constitutional:       General: He is not in acute distress.  HENT:      Head:      Comments: Left forehead contusion  Cardiovascular:      Rate and Rhythm: Normal rate.   Pulmonary:      Breath sounds: Normal breath sounds.   Abdominal:      Palpations: Abdomen is soft.      Tenderness: There is no abdominal tenderness.   Musculoskeletal:         General: No swelling.      Cervical back: Tenderness (posterior neck) present.   Skin:     Coloration: Skin is pale.   Neurological:      Mental Status: He is alert.      Comments: Oriented to person and place    Psychiatric:         Cognition and Memory: Cognition is impaired.          Additional Data:     Labs:  Results from last 7 days   Lab Units 24  0458 24  2120   WBC Thousand/uL 13.50* 15.41*   HEMOGLOBIN g/dL 14.8 14.6   HEMATOCRIT % 45.4 44.7   PLATELETS Thousands/uL 394* 444*   NEUTROS PCT %  --  62   LYMPHS PCT %  --  24   MONOS PCT %  --  7   EOS PCT %  --  5     Results from last 7 days   Lab Units 24  0609 24  2305   SODIUM mmol/L 139 136   POTASSIUM mmol/L 4.4 5.3    CHLORIDE mmol/L 102 102   CO2 mmol/L 27 27   BUN mg/dL 12 13   CREATININE mg/dL 0.77 0.77   ANION GAP mmol/L 10 7   CALCIUM mg/dL 9.6 9.9   ALBUMIN g/dL  --  3.6   TOTAL BILIRUBIN mg/dL  --  0.44   ALK PHOS U/L  --  82   ALT U/L  --  23   AST U/L  --  25   GLUCOSE RANDOM mg/dL 111 122                       Lines/Drains:  Invasive Devices       Peripheral Intravenous Line  Duration             Peripheral IV 03/31/24 Left Antecubital <1 day    Peripheral IV 04/01/24 Right Antecubital <1 day              Drain  Duration             Suprapubic Catheter -- days                          Imaging: No pertinent imaging reviewed.    Recent Cultures (last 7 days):         Last 24 Hours Medication List:   Current Facility-Administered Medications   Medication Dose Route Frequency Provider Last Rate    acetaminophen  975 mg Oral Q8H [facility] LUIS A Gama      amLODIPine  10 mg Oral Daily Jose Harden DO      And    atorvastatin  80 mg Oral Daily Jose Harden DO      bisacodyl  10 mg Rectal Daily PRN Jose Harden DO      clopidogrel  75 mg Oral Daily Jose Harden DO      vitamin B-12  500 mcg Oral Daily LUIS A Gama      enoxaparin  40 mg Subcutaneous Daily Jose Harden DO      gabapentin  300 mg Oral BID Jose Harden DO      gabapentin  600 mg Oral HS Jose Harden DO      insulin lispro  1-5 Units Subcutaneous TID AC Jose Harden DO      insulin lispro  1-5 Units Subcutaneous HS Jose Harden DO      latanoprost  1 drop Both Eyes HS Jose Harden DO      lidocaine  1 patch Topical Daily LUIS A Gama      ondansetron  4 mg Intravenous Q6H PRN Jose Harden DO      pantoprazole  40 mg Oral BID Jose Harden DO      propranolol  60 mg Oral Daily Jose Harden, DO      sertraline  25 mg Oral Daily Jose Harden DO          Today, Patient Was Seen By: LUIS A Gama    **Please Note: This note may have been constructed using a voice recognition system.**

## 2024-04-01 NOTE — H&P
[facility]  H&P  Name: Pual Lewis 73 y.o. male I MRN: [id number]  Unit/Bed#: ED 15 I Date of Admission: 3/31/2024   Date of Service: 4/1/2024 I Hospital Day: 0      Assessment/Plan   * Acute encephalopathy  Assessment & Plan  Presenting as trauma alert level B after unwitnessed fall, noted markedly confused and frequently asking where he is and where family is, remainder of examination otherwise nonfocal  Reportedly at baseline patient is somewhat confused but not to this extent  CT head negative for acute traumatic injuries or acute intracranial pathology, labs with leukocytosis but otherwise without acute abnormality to explain symptomatology.  UA with innumerable RBCs/WBC without bacteria, and has completing treatment for acute cystitis  Uncertain etiology, consideration for concussion however given repetitive nature/degree of confusion favor ensuring no other etiology  Obtain routine EEG, TSH/B12/folate.  Monitor mental status closely.  Further workup pending these results particularly if patient's mental status should not improve    Leukocytosis  Assessment & Plan  WBC 15 on admission, patient otherwise afebrile  No clear source of new infection appreciated, of note patient is to complete treatment for acute cystitis today  UA with sterile pyuria, however patient also with suprapubic catheter  Repeat CBC in AM.  Observe off further antibiotics unless patient should deteriorate    Fall  Assessment & Plan  Unwitnessed fall at acute rehab, initially presenting as a level B trauma alert.  Imaging including x-rays, CT head, CT C-spine, and MRI cervical spine all negative for acute traumatic injuries  PT/OT evaluations    Urinary retention  Assessment & Plan  History of suprapubic catheter in setting of neurogenic bladder, s/p upsizing during admission at [place] 3/28/2024  Continue routine catheter care    Multiple sclerosis (HCC)  Assessment & Plan  Noted in history,  "continue outpatient follow-up with neurology as appropriate    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HCC)  Assessment & Plan  S/p prior stenting   Continue Plavix and blood pressure control    Type 2 diabetes mellitus without complication, without long-term current use of insulin (HCC)  Assessment & Plan  No results found for: \"HGBA1C\"    No results for input(s): \"POCGLU\" in the last 72 hours.    Blood Sugar Average: Last 72 hrs:  Hold home Trulicity, start correctional insulin coverage while admitted  Carb controlled diet  Initiate hypoglycemia protocol      Primary hypertension  Assessment & Plan  Continue home amlodipine 10 mg daily and propranolol 60 mg daily with strict hold parameters and monitor blood pressure per protocol    HONEY on CPAP  Assessment & Plan  Continue nightly CPAP             VTE Prophylaxis: Enoxaparin (Lovenox)  / sequential compression device   Code Status: Level 1 - Full Code   POLST: POLST form is not discussed and not completed at this time.  Discussion with family: Attempted to reach brother via telephone but unable to reach; apparently trauma resident able to reach brother earlier    Anticipated Length of Stay:  Patient will be admitted on an Inpatient basis with an anticipated length of stay of greater than 2 midnights.   Justification for Hospital Stay: Please see detailed plans noted above.    Chief Complaint:     Fall, confusion  History of Present Illness:  Paul Lewis is a 73 y.o. male who has a past medical history significant for basilar artery aneurysm s/p stenting, multiple sclerosis, neurogenic bladder s/p SPT, HONEY on CPAP, hypertension, type 2 diabetes, and of note recently hospitalized at [facility] 3/26/2024 - 3/29/2024 with sepsis presumed secondary to urinary source with cultures growing Candida, to complete antibiotic course today ultimately discharged to acute rehab.  He presented as a level B trauma alert from acute rehab after an unwitnessed fall with reported head " strike.  Patient himself aware that he fell, but does not recall the events leading to the fall and very repetitive throughout evaluation, intermittently aware that he is in the hospital but frequently asking where he is and additionally asking for family.  He denies any fever/chills, pain in any other location aside from head, focal weakness, or new numbness/ting/paresthesias.    During trauma evaluation imaging was obtained thus far all negative for acute traumatic injuries including x-rays, CT head, CT C-spine, and MRI cervical spine.  Labs revealed a leukocytosis and given the ongoing confusion he is admitted for further workup and management of this.    Review of Systems:    Constitutional:  Denies fever or chills   Eyes:  Denies change in visual acuity   HENT:  Denies nasal congestion or sore throat   Respiratory:  Denies cough or shortness of breath   Cardiovascular:  Denies chest pain or edema   GI:  Denies abdominal pain, nausea, vomiting, bloody stools or diarrhea   :  Denies dysuria   Musculoskeletal:  Denies back pain or joint pain   Integument:  Denies rash   Neurologic:  Denies focal weakness or sensory changes but reports headache  Endocrine:  Denies polyuria or polydipsia   Lymphatic:  Denies swollen glands   Psychiatric:  Denies depression or anxiety but confusion noted and reported    Past Medical and Surgical History:   Past Medical History:   Diagnosis Date    Diabetes (HCC)     Hypertension     Multiple sclerosis (HCC)      Past Surgical History:   Procedure Laterality Date    CEREBRAL ANEURYSM REPAIR         Meds/Allergies:    Current Facility-Administered Medications:     acetaminophen (TYLENOL) tablet 650 mg, 650 mg, Oral, Q6H PRN, Jose Harden DO    amLODIPine (NORVASC) tablet 10 mg, 10 mg, Oral, Daily **AND** atorvastatin (LIPITOR) tablet 80 mg, 80 mg, Oral, Daily, Jose Harden DO    bisacodyl (DULCOLAX) rectal suppository 10 mg, 10 mg, Rectal, Daily PRN, Jose Harden DO     cephalexin (KEFLEX) capsule 500 mg, 500 mg, Oral, Q6H LEE, Jose Harden DO    clopidogrel (PLAVIX) tablet 75 mg, 75 mg, Oral, Daily, Jose Harden DO    enoxaparin (LOVENOX) subcutaneous injection 40 mg, 40 mg, Subcutaneous, Daily, Jose Harden DO    gabapentin (NEURONTIN) capsule 300 mg, 300 mg, Oral, BID, Jose Harden DO    gabapentin (NEURONTIN) capsule 600 mg, 600 mg, Oral, HS, Jose Harden DO    insulin lispro (HumALOG/ADMELOG) 100 units/mL subcutaneous injection 1-5 Units, 1-5 Units, Subcutaneous, TID AC **AND** Fingerstick Glucose (POCT), , , TID AC, Jose Harden DO    insulin lispro (HumALOG/ADMELOG) 100 units/mL subcutaneous injection 1-5 Units, 1-5 Units, Subcutaneous, HS, Jose Harden DO    latanoprost (XALATAN) 0.005 % ophthalmic solution 1 drop, 1 drop, Both Eyes, HS, Jose Harden DO    ondansetron (ZOFRAN) injection 4 mg, 4 mg, Intravenous, Q6H PRN, Jose Harden DO    pantoprazole (PROTONIX) EC tablet 40 mg, 40 mg, Oral, BID, Jose Harden DO    propranolol (INDERAL LA) 24 hr capsule 60 mg, 60 mg, Oral, Daily, Jose Harden DO    sertraline (ZOLOFT) tablet 25 mg, 25 mg, Oral, Daily, Jose Harden DO    Current Outpatient Medications:     amLODIPine-atorvastatin (CADUET) 10-80 MG per tablet, Take 1 tablet by mouth daily, Disp: , Rfl:     bisacodyl (DULCOLAX) 10 mg suppository, Insert 10 mg into the rectum daily as needed for constipation, Disp: , Rfl:     cephalexin (KEFLEX) 500 mg capsule, Take 500 mg by mouth every 6 (six) hours, Disp: , Rfl:     clopidogrel (PLAVIX) 75 mg tablet, Take 75 mg by mouth daily, Disp: , Rfl:     dulaglutide (Trulicity) 0.75 MG/0.5ML injection, Inject 0.75 mg under the skin every 7 days, Disp: , Rfl:     gabapentin (NEURONTIN) 300 mg capsule, Take 300 mg by mouth 2 (two) times a day, Disp: , Rfl:     gabapentin (NEURONTIN) 300 mg capsule, Take 600 mg by mouth daily at bedtime, Disp: , Rfl:     latanoprost (XALATAN) 0.005 % ophthalmic  "solution, Administer 1 drop to both eyes daily at bedtime, Disp: , Rfl:     pantoprazole (PROTONIX) 40 mg tablet, Take 40 mg by mouth 2 (two) times a day, Disp: , Rfl:     propranolol (INDERAL LA) 60 mg 24 hr capsule, Take 60 mg by mouth daily, Disp: , Rfl:     sertraline (ZOLOFT) 25 mg tablet, Take 25 mg by mouth daily, Disp: , Rfl:         Allergies:   Allergies   Allergen Reactions    Cephalexin Rash     History:  Marital Status: Single     Substance Use History:   Social History     Substance and Sexual Activity   Alcohol Use Not Currently     Social History     Tobacco Use   Smoking Status Former    Types: Cigarettes    Start date:     Quit date:     Years since quittin.2   Smokeless Tobacco Never     Social History     Substance and Sexual Activity   Drug Use Not Currently       Family History:  Family History   Problem Relation Age of Onset    Heart attack Mother     Stroke Mother     Heart attack Father     Aneurysm Father        Physical Exam:     Vitals:   Blood Pressure: (!) 176/95 (24 030)  Pulse: 74 (24)  Temperature: 98.3 °F (36.8 °C) (24)  Temp Source: Oral (24)  Respirations: 16 (24)  Height: 5' 7\" (170.2 cm) (24)  Weight - Scale: 69.3 kg (152 lb 12.5 oz) (24)  SpO2: 97 % (24)    Constitutional:  Well developed, well nourished, no acute distress, non-toxic appearance   Eyes:  PERRL, conjunctiva normal   HENT: Small laceration to left forehead, otherwise atraumatic, external ears normal, nose normal, oropharynx moist, no pharyngeal exudates. Neck- normal range of motion, no tenderness, supple   Respiratory:  No respiratory distress, normal breath sounds, no rales, no wheezing   Cardiovascular:  Normal rate, normal rhythm, no murmurs, no gallops, no rubs   GI:  Soft, nondistended, normal bowel sounds, nontender, no organomegaly, no mass, no rebound, no guarding   :  No costovertebral angle tenderness "   Musculoskeletal:  No edema, no tenderness, no deformities. Back- no tenderness  Integument:  Well hydrated, no rash   Lymphatic:  No lymphadenopathy noted   Neurologic:  Alert &awake, communicative, CN 2-12 normal, normal motor function, normal sensory function, no focal deficits noted   Psychiatric:  Speech and behavior confused and repetitive, frequently asking where he is and where family is      Lab Results: I have personally reviewed pertinent reports.      Results from last 7 days   Lab Units 03/31/24  2120   WBC Thousand/uL 15.41*   HEMOGLOBIN g/dL 14.6   HEMATOCRIT % 44.7   PLATELETS Thousands/uL 444*   NEUTROS PCT % 62   LYMPHS PCT % 24   MONOS PCT % 7   EOS PCT % 5     Results from last 7 days   Lab Units 03/31/24  2305 03/31/24  2119   POTASSIUM mmol/L 5.3  --    CHLORIDE mmol/L 102  --    CO2 mmol/L 27  --    CO2, I-STAT mmol/L  --  31   BUN mg/dL 13  --    CREATININE mg/dL 0.77  --    CALCIUM mg/dL 9.9  --    ALK PHOS U/L 82  --    ALT U/L 23  --    AST U/L 25  --    GLUCOSE, ISTAT mg/dl  --  127           Imaging: I have personally reviewed pertinent reports.      XR Trauma multiple (B/RA trauma bay ONLY)    Result Date: 4/1/2024  Narrative: XR TRAUMA MULTIPLE INDICATION: TRAUMA. COMPARISON: None FINDINGS: CHEST: Supine frontal view of the chest is obtained. Clear lungs. No evidence of a pneumothorax or pleural effusion. Upright images are more sensitive to detect pneumothoraces if relevant. Aortic arch calcifications, cardiac and mediastinal contours otherwise appear grossly unremarkable No displaced fracture. 1 view reviewed.     Impression: No acute cardiopulmonary disease within limitations of supine imaging. Other findings as above. Correlation with pending trauma CTs recommended. Workstation performed: [id number]     CT spine cervical without contrast    Result Date: 3/31/2024  Narrative: CT CERVICAL SPINE - WITHOUT CONTRAST INDICATION:   trauma. COMPARISON:  None. TECHNIQUE:  CT  examination of the cervical spine was performed without intravenous contrast.  Contiguous axial images were obtained. Multiplanar 2D reformatted images were created from the source data. Radiation dose length product (DLP) for this visit:  397.2 mGy-cm .  This examination, like all CT scans performed in the [facility], was performed utilizing techniques to minimize radiation dose exposure, including the use of iterative reconstruction and automated exposure control. IMAGE QUALITY:  Diagnostic. FINDINGS: ALIGNMENT:  Normal alignment of the cervical spine. No subluxation. VERTEBRAE:  No acute cervical spine fracture. Vertebral body heights are preserved. DEGENERATIVE CHANGES:  Moderate multilevel cervical degenerative changes are noted. Broad-based posterior disc protrusions and disc osteophyte complex throughout the cervical spine from the C3-4, C4-5, C5-6, and C6-7 levels noted with multifocal mild central spinal canal stenosis. No focal critical central canal stenosis. PREVERTEBRAL AND PARASPINAL SOFT TISSUES: Unremarkable THORACIC INLET: Unremarkable.     Impression: Multilevel degenerative changes without acute cervical spine fracture or traumatic malalignment. Workstation performed: [id number]     CT head without contrast    Result Date: 3/31/2024  Narrative: CT BRAIN - WITHOUT CONTRAST INDICATION:   trauma. COMPARISON:  None. TECHNIQUE:  CT examination of the brain was performed.  Multiplanar 2D reformatted images were created from the source data. Radiation dose length product (DLP) for this visit:  949.78 mGy-cm .  This examination, like all CT scans performed in the [facility], was performed utilizing techniques to minimize radiation dose exposure, including the use of iterative  reconstruction and automated exposure control. IMAGE QUALITY:  Diagnostic. FINDINGS: PARENCHYMA: Decreased attenuation is noted in periventricular and subcortical white matter demonstrating an  appearance that is statistically most likely to represent mild microangiopathic change. Metallic vascular stent within the basilar artery noted with associated metallic streak artifact limiting evaluation of the surrounding structures. No CT signs of acute infarction.  No intracranial mass, mass effect or midline shift.  No acute parenchymal hemorrhage. VENTRICLES AND EXTRA-AXIAL SPACES: Moderate central greater than cortical volume loss/atrophy is seen. Basal cisterns appear patent and unremarkable.. VISUALIZED ORBITS: Normal visualized orbits. PARANASAL SINUSES: Mucoperiosteal thickening in the bilateral frontal, ethmoid, and right sphenoid sinus is noted. The remaining paranasal sinuses and mastoid air cells appear adequately aerated and clear CALVARIUM AND EXTRACRANIAL SOFT TISSUES: Bony calvarium and temporomandibular joints are intact. Left anterior frontal scalp soft tissue swelling.     Impression: No acute intracranial hemorrhage or depressed calvarial fracture. Senescent changes and other ancillary findings detailed above. Workstation performed: [id number]         ** Please Note: Dragon 360 Dictation voice to text software was used in the creation of this document. **

## 2024-04-01 NOTE — ASSESSMENT & PLAN NOTE
Patient recently admitted at [facility] 3/26/2024 - 3/29/2024 with sepsis presumed secondary to urinary source, cultures growing Candida  Was discharged on Keflex for planned 5-day treatment course, to be completed today.

## 2024-04-01 NOTE — PROCEDURES
POC FAST US    Date/Time: 3/31/2024 9:32 PM    Performed by: Zeus Horvath MD  Authorized by: Zeus Horvath MD    Patient location:  ED  Other Assisting Provider: No    Procedure details:     Exam Type:  Diagnostic    Assess for:  Intra-abdominal fluid and pericardial effusion    Technique: FAST      Views obtained:  Heart - Pericardial sac, LUQ - Splenorenal space, Suprapubic - Pouch of [place] and RUQ - Rogers's Pouch    Image quality: diagnostic      Image availability:  Not saved  FAST Findings:     RUQ (Hepatorenal) free fluid: absent      LUQ (Splenorenal) free fluid: absent      Suprapubic free fluid: absent      Cardiac wall motion: identified      Pericardial effusion: absent    Interpretation:     Impressions: negative

## 2024-04-01 NOTE — ASSESSMENT & PLAN NOTE
WBC 15 on admission, patient otherwise afebrile  No clear source of new infection appreciated, of note patient is to complete treatment for acute cystitis today  UA with sterile pyuria, however patient also with suprapubic catheter  Repeat CBC in AM.  Observe off further antibiotics unless patient should deteriorate

## 2024-04-01 NOTE — ASSESSMENT & PLAN NOTE
History of suprapubic catheter in setting of neurogenic bladder, s/p upsizing during admission at [place] 3/28/2024  Continue routine catheter care

## 2024-04-01 NOTE — ASSESSMENT & PLAN NOTE
Continue home amlodipine 10 mg daily and propranolol 60 mg daily with strict hold parameters and monitor blood pressure per protocol

## 2024-04-01 NOTE — ASSESSMENT & PLAN NOTE
Presents from rehab as trauma alert level B after unwitnessed fall, noted markedly confused requiring frequent re-orientation. Does have degree of confusion/forgetfulness at baseline.   CTH negative for acute traumatic injuries or acute intracranial pathology, labs with leukocytosis but otherwise without acute abnormality to explain symptomatology.  UA with innumerable RBCs/WBC without bacteria, and has completed treatment for acute cystitis  Uncertain etiology, consideration for concussion however given repetitive nature/degree of confusion favor ensuring no other etiology  Routine EEG pending. TSH/folate WNL. B12 low normal, will start supplementation.   Consult geriatrics   Monitor mental status closely.    Further workup pending EEG/clinical course   PT/OT evaluations

## 2024-04-01 NOTE — ASSESSMENT & PLAN NOTE
"No results found for: \"HGBA1C\"    No results for input(s): \"POCGLU\" in the last 72 hours.    Blood Sugar Average: Last 72 hrs:  Hold home Trulicity, start correctional insulin coverage while admitted  Carb controlled diet  Initiate hypoglycemia protocol    "

## 2024-04-01 NOTE — ASSESSMENT & PLAN NOTE
History of suprapubic catheter in setting of neurogenic bladder, s/p upsizing during admission at [place] 3/28/2024  Continue routine catheter care

## 2024-04-01 NOTE — PROGRESS NOTES
"Progress Note - Trauma Tertiary Survery   Paul Lewis 73 y.o. male [id number]   Unit/Bed#: [facility] 904-01 Encounter: [id number]     Assessment & Plan   Summary of Diagnosed Injuries:   - S/p fall with head strike on plavix  - Ecchymosis to L forehead  - Prior neck pain has resolved, MRI Cervical spine completed and reviewed  - Neurovascularly at baseline  - Cervical collar removed and no cervical spine injury suspected  - No injuries identified on trauma exam or imaging  - No additional injuries identified on tertiary trauma survey      PLAN:  - No further imaging warranted  - Recommend tylenol for headaches  - Trauma service signing off  - All further workup per Internal medicine    VTE Prophylaxis:Enoxaparin (Lovenox)     Disposition: All further disposition per internal medicine. No further work up from trauma standpoint    Code status:  Level 1 - Full Code    Consultants: IP CONSULT TO GERONTOLOGY     Subjective   Transfer from: none    Mechanism of Injury:Fall     Chief Complaint: \"I have a headache\"    HPI/Last 24 hour events: patient reports that his head hurts from hitting the front of it yesterday. He is disoriented which is his baseline. He denies any other pain and there is no other evidence of trauma on exam.      Objective   Vitals:   Temp:  [97.6 °F (36.4 °C)-98.3 °F (36.8 °C)] 97.6 °F (36.4 °C)  HR:  [60-89] 76  Resp:  [12-21] 16  BP: (137-176)/() 137/61    I/O         03/30 0701  03/31 0700 03/31 0701  04/01 0700 04/01 0701  04/02 0700    IV Piggyback  100     Total Intake(mL/kg)  100 (1.4)     Urine (mL/kg/hr)   1425 (2.9)    Total Output   1425    Net  +100 -1425                    Physical Exam:   GENERAL APPEARANCE: Patient in no acute distress.  HEENT: Left forehead ecchymosis; PERRL, EOMs intact; Mucous membranes moist  NECK / BACK: Nontender cervical spine, some paraspinal rigidity  CV: Regular rate and rhythm; no murmur/gallops/rubs appreciated.  CHEST / LUNGS: Clear to auscultation; " no wheezes/rales/rhonci.  ABD: NABS; soft; non-distended; non-tender.  : Voiding  EXT: +2 pulses bilaterally upper & lower extremities; no edema.  NEURO: GCS 14 due to disorientation to time or place; bilateral  strength equal; able to wiggle lower extremities  SKIN: Warm, dry and well perfused; no rash; no jaundice.      Invasive Devices       Peripheral Intravenous Line  Duration             Peripheral IV 03/31/24 Left Antecubital <1 day    Peripheral IV 04/01/24 Right Antecubital <1 day              Drain  Duration             Suprapubic Catheter -- days                     Lab Results: Results: I have personally reviewed all pertinent laboratory/tests results    Imaging Results: I have personally reviewed pertinent reports.    Chest Xray(s): negative for acute findings   FAST exam(s): negative for acute findings   CT Scan(s): negative for acute findings   Additional Xray(s): N/A     Other Studies:   MRI cervical spine wo contrast   Final Result by Rasheed Lassiter MD (04/01 0848)      Severely motion degraded, incomplete MR of the cervical spine, verging on nondiagnostic.               Workstation performed: [id number]         XR Trauma multiple ([facility]/SL[facility] trauma bay ONLY)   Final Result by Branden Dale DO (04/01 0040)      No acute cardiopulmonary disease within limitations of supine imaging.      Other findings as above.      Correlation with pending trauma CTs recommended.         Workstation performed: [id number]         XR chest 1 view   Final Result by Branden Dale DO (04/01 1047)      No acute cardiopulmonary disease within limitations of supine imaging.      Other findings as above.      Correlation with pending trauma CTs recommended.         Workstation performed: [id number]         XR pelvis ap only 1 or 2 vw   Final Result by Branden Dale DO (04/01 1047)      No acute cardiopulmonary disease within limitations of supine imaging.      Other findings as  above.      Correlation with pending trauma CTs recommended.         Workstation performed: [id number]         CT head without contrast   Final Result by Wil Phillips MD (03/31 2232)      No acute intracranial hemorrhage or depressed calvarial fracture. Senescent changes and other ancillary findings detailed above.                  Workstation performed: [id number]         CT spine cervical without contrast   Final Result by Wil Phillips MD (03/31 2238)      Multilevel degenerative changes without acute cervical spine fracture or traumatic malalignment.                  Workstation performed: [id number]

## 2024-04-01 NOTE — CASE MANAGEMENT
Case Management Assessment    Patient name Paul Lewis  Location [facility] 904/[facility] 904-01 MRN [id number]  : 1950 Date 2024       Current Admission Date: 3/31/2024  Current Admission Diagnosis:Acute encephalopathy   Patient Active Problem List    Diagnosis Date Noted    Acute encephalopathy 2024    Leukocytosis 2024    HONEY on CPAP 2024    Fall 2024    Primary hypertension 2024    Type 2 diabetes mellitus without complication, without long-term current use of insulin (HCC) 2024    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HCC) 2024    Multiple sclerosis (HCC) 2024    Urinary retention 2024    Neck pain 2024    Vitamin B deficiency 2024      LOS (days): 0  Geometric Mean LOS (GMLOS) (days):   Days to GMLOS:     OBJECTIVE:    Risk of Unplanned Readmission Score: 9.97         Current admission status: Inpatient       Preferred Pharmacy:   [facility]/pharmacy #[id number] - [place] - [id number] [id number]  Phone: [phone] Fax: [phone]    [place], [place] -  [address]   [address] [facility] [address]  Phone: [phone] Fax: [phone]     [facility] - [place], [place] - [address] [place] [address]  Phone: [phone] Fax: [phone]    Primary Care Provider: Frankie Damon DO    Primary Insurance: [facility]  Secondary Insurance:     ASSESSMENT:  Active Health Care Proxies    There are no active Health Care Proxies on file.                 Readmission Root Cause  30 Day Readmission: Yes  Who directed you to return to the hospital?: Other (comment) ([facility])  Did you understand whom to contact if you had questions or problems?: Yes  Did you get your prescriptions before you left the hospital?: Yes  Were you able to get your prescriptions filled when you left the hospital?: Yes  Did you take your medications as prescribed?: Yes  Were you  able to get to your follow-up appointments?: Yes  During previous admission, was a post-acute recommendation made?: Yes  What post-acute resources were offered?: STR  Patient was readmitted due to: Pt fell while at [facility]  Action Plan: MRI, EEG    Patient Information  Admitted from:: Facility ([facility])  Mental Status: Alert                               Social Determinants of Health (SDOH)      Flowsheet Row Most Recent Value   Housing Stability    In the last 12 months, was there a time when you were not able to pay the mortgage or rent on time? N   In the last 12 months, how many places have you lived? 1   In the last 12 months, was there a time when you did not have a steady place to sleep or slept in a shelter (including now)? N   Transportation Needs    In the past 12 months, has lack of transportation kept you from medical appointments or from getting medications? no   In the past 12 months, has lack of transportation kept you from meetings, work, or from getting things needed for daily living? No   Food Insecurity    Within the past 12 months, you worried that your food would run out before you got the money to buy more. Never true   Within the past 12 months, the food you bought just didn't last and you didn't have money to get more. Never true   Utilities    In the past 12 months has the electric, gas, oil, or water company threatened to shut off services in your home? No                Pt is a 30 day readmit, was discharged on 3/29, please refer to CM assessment completed on 3/27.  Pt is a chart merge.  Pt fell while at short term rehab, was at [facility] in [place].  Pt primarily resides with his brother and sister and has a home health aid to assist with ADL's.  Pt does use a wheelchair at baseline.  Pt has [employer].  CM will continue to follow for discharge planning needs.

## 2024-04-01 NOTE — UTILIZATION REVIEW
NOTIFICATION OF ADMISSION DISCHARGE   This is a Notification of Discharge from [facility]. Please be advised that this patient has been discharge from our facility. Below you will find the admission and discharge date and time including the patient’s disposition.   UTILIZATION REVIEW CONTACT:  Marsha Garcia  Utilization   Network Utilization Review Department  Phone: [phone] x carefully listen to the prompts. All voicemails are confidential.  Email: [email or web address]     ADMISSION INFORMATION  PRESENTATION DATE: 3/26/2024  7:23 AM  OBERVATION ADMISSION DATE:   INPATIENT ADMISSION DATE: 3/26/24 10:27 AM   DISCHARGE DATE: 3/29/2024  3:17 PM   DISPOSITION:Non [facility] SNF/TCU/SNU    Network Utilization Review Department  ATTENTION: Please call with any questions or concerns to [phone] and carefully listen to the prompts so that you are directed to the right person. All voicemails are confidential.   For Discharge needs, contact Care Management DC Support Team at [phone] opt. 2  Send all requests for admission clinical reviews, approved or denied determinations and any other requests to dedicated fax number below belonging to the campus where the patient is receiving treatment. List of dedicated fax numbers for the Facilities:  FACILITY NAME UR FAX NUMBER   ADMISSION DENIALS (Administrative/Medical Necessity) [phone]   DISCHARGE SUPPORT TEAM ([facility]) [phone]   PARENT CHILD HEALTH (Maternity/NICU/Pediatrics) [phone]   [facility] [phone]   [facility] [phone]   [facility] [phone]   [facility] [phone]   [facility] [phone]   [facility] [phone]   [facility] [phone]   [facility]  [phone]   [facility] [phone]   [facility] [phone]   [facility] [phone]   [facility] [phone]

## 2024-04-02 VITALS
SYSTOLIC BLOOD PRESSURE: 155 MMHG | TEMPERATURE: 97.9 F | HEIGHT: 67 IN | OXYGEN SATURATION: 95 % | WEIGHT: 152.78 LBS | WEIGHT: 152.78 LBS | SYSTOLIC BLOOD PRESSURE: 155 MMHG | HEIGHT: 67 IN | DIASTOLIC BLOOD PRESSURE: 78 MMHG | BODY MASS INDEX: 23.98 KG/M2 | RESPIRATION RATE: 16 BRPM | DIASTOLIC BLOOD PRESSURE: 78 MMHG | RESPIRATION RATE: 16 BRPM | HEART RATE: 67 BPM | BODY MASS INDEX: 23.98 KG/M2 | HEART RATE: 67 BPM | TEMPERATURE: 97.9 F | OXYGEN SATURATION: 95 %

## 2024-04-02 LAB
ANION GAP SERPL CALCULATED.3IONS-SCNC: 12 MMOL/L (ref 4–13)
ANION GAP SERPL CALCULATED.3IONS-SCNC: 12 MMOL/L (ref 4–13)
BASOPHILS # BLD AUTO: 0.14 THOUSANDS/ÂΜL (ref 0–0.1)
BASOPHILS # BLD AUTO: 0.14 THOUSANDS/ÂΜL (ref 0–0.1)
BASOPHILS NFR BLD AUTO: 1 % (ref 0–1)
BASOPHILS NFR BLD AUTO: 1 % (ref 0–1)
BUN SERPL-MCNC: 17 MG/DL (ref 5–25)
BUN SERPL-MCNC: 17 MG/DL (ref 5–25)
CALCIUM SERPL-MCNC: 9.3 MG/DL (ref 8.4–10.2)
CALCIUM SERPL-MCNC: 9.3 MG/DL (ref 8.4–10.2)
CHLORIDE SERPL-SCNC: 104 MMOL/L (ref 96–108)
CHLORIDE SERPL-SCNC: 104 MMOL/L (ref 96–108)
CO2 SERPL-SCNC: 24 MMOL/L (ref 21–32)
CO2 SERPL-SCNC: 24 MMOL/L (ref 21–32)
CREAT SERPL-MCNC: 0.88 MG/DL (ref 0.6–1.3)
CREAT SERPL-MCNC: 0.88 MG/DL (ref 0.6–1.3)
EOSINOPHIL # BLD AUTO: 0.88 THOUSAND/ÂΜL (ref 0–0.61)
EOSINOPHIL # BLD AUTO: 0.88 THOUSAND/ÂΜL (ref 0–0.61)
EOSINOPHIL NFR BLD AUTO: 8 % (ref 0–6)
EOSINOPHIL NFR BLD AUTO: 8 % (ref 0–6)
ERYTHROCYTE [DISTWIDTH] IN BLOOD BY AUTOMATED COUNT: 14.8 % (ref 11.6–15.1)
ERYTHROCYTE [DISTWIDTH] IN BLOOD BY AUTOMATED COUNT: 14.8 % (ref 11.6–15.1)
GFR SERPL CREATININE-BSD FRML MDRD: 84 ML/MIN/1.73SQ M
GFR SERPL CREATININE-BSD FRML MDRD: 84 ML/MIN/1.73SQ M
GLUCOSE SERPL-MCNC: 112 MG/DL (ref 65–140)
GLUCOSE SERPL-MCNC: 120 MG/DL (ref 65–140)
GLUCOSE SERPL-MCNC: 120 MG/DL (ref 65–140)
GLUCOSE SERPL-MCNC: 131 MG/DL (ref 65–140)
GLUCOSE SERPL-MCNC: 131 MG/DL (ref 65–140)
HCT VFR BLD AUTO: 41.7 % (ref 36.5–49.3)
HCT VFR BLD AUTO: 41.7 % (ref 36.5–49.3)
HGB BLD-MCNC: 13.3 G/DL (ref 12–17)
HGB BLD-MCNC: 13.3 G/DL (ref 12–17)
IMM GRANULOCYTES # BLD AUTO: 0.06 THOUSAND/UL (ref 0–0.2)
IMM GRANULOCYTES # BLD AUTO: 0.06 THOUSAND/UL (ref 0–0.2)
IMM GRANULOCYTES NFR BLD AUTO: 1 % (ref 0–2)
IMM GRANULOCYTES NFR BLD AUTO: 1 % (ref 0–2)
LYMPHOCYTES # BLD AUTO: 4.3 THOUSANDS/ÂΜL (ref 0.6–4.47)
LYMPHOCYTES # BLD AUTO: 4.3 THOUSANDS/ÂΜL (ref 0.6–4.47)
LYMPHOCYTES NFR BLD AUTO: 39 % (ref 14–44)
LYMPHOCYTES NFR BLD AUTO: 39 % (ref 14–44)
MCH RBC QN AUTO: 29.7 PG (ref 26.8–34.3)
MCH RBC QN AUTO: 29.7 PG (ref 26.8–34.3)
MCHC RBC AUTO-ENTMCNC: 31.9 G/DL (ref 31.4–37.4)
MCHC RBC AUTO-ENTMCNC: 31.9 G/DL (ref 31.4–37.4)
MCV RBC AUTO: 93 FL (ref 82–98)
MCV RBC AUTO: 93 FL (ref 82–98)
MONOCYTES # BLD AUTO: 0.9 THOUSAND/ÂΜL (ref 0.17–1.22)
MONOCYTES # BLD AUTO: 0.9 THOUSAND/ÂΜL (ref 0.17–1.22)
MONOCYTES NFR BLD AUTO: 8 % (ref 4–12)
MONOCYTES NFR BLD AUTO: 8 % (ref 4–12)
MRSA NOSE QL CULT: NORMAL
MRSA NOSE QL CULT: NORMAL
NEUTROPHILS # BLD AUTO: 4.87 THOUSANDS/ÂΜL (ref 1.85–7.62)
NEUTROPHILS # BLD AUTO: 4.87 THOUSANDS/ÂΜL (ref 1.85–7.62)
NEUTS SEG NFR BLD AUTO: 43 % (ref 43–75)
NEUTS SEG NFR BLD AUTO: 43 % (ref 43–75)
NRBC BLD AUTO-RTO: 0 /100 WBCS
NRBC BLD AUTO-RTO: 0 /100 WBCS
PLATELET # BLD AUTO: 416 THOUSANDS/UL (ref 149–390)
PLATELET # BLD AUTO: 416 THOUSANDS/UL (ref 149–390)
PMV BLD AUTO: 8.5 FL (ref 8.9–12.7)
PMV BLD AUTO: 8.5 FL (ref 8.9–12.7)
POTASSIUM SERPL-SCNC: 3.8 MMOL/L (ref 3.5–5.3)
POTASSIUM SERPL-SCNC: 3.8 MMOL/L (ref 3.5–5.3)
RBC # BLD AUTO: 4.48 MILLION/UL (ref 3.88–5.62)
RBC # BLD AUTO: 4.48 MILLION/UL (ref 3.88–5.62)
SODIUM SERPL-SCNC: 140 MMOL/L (ref 135–147)
SODIUM SERPL-SCNC: 140 MMOL/L (ref 135–147)
WBC # BLD AUTO: 11.15 THOUSAND/UL (ref 4.31–10.16)
WBC # BLD AUTO: 11.15 THOUSAND/UL (ref 4.31–10.16)

## 2024-04-02 PROCEDURE — 80048 BASIC METABOLIC PNL TOTAL CA: CPT | Performed by: NURSE PRACTITIONER

## 2024-04-02 PROCEDURE — 97167 OT EVAL HIGH COMPLEX 60 MIN: CPT

## 2024-04-02 PROCEDURE — 99232 SBSQ HOSP IP/OBS MODERATE 35: CPT | Performed by: NURSE PRACTITIONER

## 2024-04-02 PROCEDURE — 85025 COMPLETE CBC W/AUTO DIFF WBC: CPT | Performed by: NURSE PRACTITIONER

## 2024-04-02 PROCEDURE — 99239 HOSP IP/OBS DSCHRG MGMT >30: CPT | Performed by: NURSE PRACTITIONER

## 2024-04-02 PROCEDURE — 97163 PT EVAL HIGH COMPLEX 45 MIN: CPT

## 2024-04-02 PROCEDURE — 82948 REAGENT STRIP/BLOOD GLUCOSE: CPT

## 2024-04-02 RX ORDER — ACETAMINOPHEN 325 MG/1
975 TABLET ORAL EVERY 8 HOURS SCHEDULED
Status: ON HOLD
Start: 2024-04-02

## 2024-04-02 RX ORDER — SODIUM CHLORIDE, SODIUM GLUCONATE, SODIUM ACETATE, POTASSIUM CHLORIDE, MAGNESIUM CHLORIDE, SODIUM PHOSPHATE, DIBASIC, AND POTASSIUM PHOSPHATE .53; .5; .37; .037; .03; .012; .00082 G/100ML; G/100ML; G/100ML; G/100ML; G/100ML; G/100ML; G/100ML
500 INJECTION, SOLUTION INTRAVENOUS ONCE
Status: COMPLETED | OUTPATIENT
Start: 2024-04-02 | End: 2024-04-02

## 2024-04-02 RX ORDER — LIDOCAINE 50 MG/G
1 PATCH TOPICAL DAILY
Status: ON HOLD
Start: 2024-04-03

## 2024-04-02 RX ADMIN — LIDOCAINE 1 PATCH: 50 PATCH CUTANEOUS at 09:30

## 2024-04-02 RX ADMIN — CYANOCOBALAMIN TAB 500 MCG 500 MCG: 500 TAB at 07:29

## 2024-04-02 RX ADMIN — GABAPENTIN 300 MG: 300 CAPSULE ORAL at 17:48

## 2024-04-02 RX ADMIN — ACETAMINOPHEN 975 MG: 325 TABLET, FILM COATED ORAL at 04:55

## 2024-04-02 RX ADMIN — SODIUM CHLORIDE, SODIUM GLUCONATE, SODIUM ACETATE, POTASSIUM CHLORIDE, MAGNESIUM CHLORIDE, SODIUM PHOSPHATE, DIBASIC, AND POTASSIUM PHOSPHATE 500 ML: .53; .5; .37; .037; .03; .012; .00082 INJECTION, SOLUTION INTRAVENOUS at 05:55

## 2024-04-02 RX ADMIN — ACETAMINOPHEN 975 MG: 325 TABLET, FILM COATED ORAL at 14:18

## 2024-04-02 RX ADMIN — SERTRALINE HYDROCHLORIDE 25 MG: 25 TABLET ORAL at 07:29

## 2024-04-02 RX ADMIN — ATORVASTATIN CALCIUM 80 MG: 80 TABLET, FILM COATED ORAL at 07:29

## 2024-04-02 RX ADMIN — ENOXAPARIN SODIUM 40 MG: 40 INJECTION SUBCUTANEOUS at 07:28

## 2024-04-02 RX ADMIN — PANTOPRAZOLE SODIUM 40 MG: 40 TABLET, DELAYED RELEASE ORAL at 17:48

## 2024-04-02 RX ADMIN — AMLODIPINE BESYLATE 10 MG: 10 TABLET ORAL at 07:29

## 2024-04-02 RX ADMIN — PANTOPRAZOLE SODIUM 40 MG: 40 TABLET, DELAYED RELEASE ORAL at 04:55

## 2024-04-02 RX ADMIN — CLOPIDOGREL BISULFATE 75 MG: 75 TABLET ORAL at 07:28

## 2024-04-02 RX ADMIN — PROPRANOLOL HYDROCHLORIDE 60 MG: 60 CAPSULE, EXTENDED RELEASE ORAL at 07:30

## 2024-04-02 RX ADMIN — GABAPENTIN 300 MG: 300 CAPSULE ORAL at 07:29

## 2024-04-02 NOTE — WOUND OSTOMY CARE
Progress Note - Wound   Paul Lewis 73 y.o. male MRN: [id number]  Unit/Bed#: [facility] 904-01 Encounter: [id number]      Assessment:   Patient admitted to [facility] due to acute encephalopathy. History of diabetes, HTN, MS. Wound care consulted for sacral wound. Patient agreeable to assessment, alert and oriented x2, suprapubic catheter for urine management, incontinent of bowel, assist of 2-3 to stand for assessment with physical therapy present, is a moderate assist with care.     1. Pressure injury right buttock, stage 1-POA- Wound is oval in shape, dry and intact skin, 100% non-blanchable red intact skin. Zee-wound is dry, intact, blanchable.    2. Bilateral elbows, hips, sacrum, left buttock and heels- skin is dry, intact, blanchable.    Educated patient on importance of frequent offloading of pressure via turning, repositioning, and weight-shifting. Verbalized understanding of plan of care.    No induration, fluctuance, odor, warmth, or purulence noted to the above noted wound. Patient tolerated well, reports mild pain to the wound. Primary nurse aware of plan of care. See flow sheets for more detailed assessment findings. Will follow along.      Skin care plans:  1-Hydraguard to bilateral sacrum, buttock and heels BID and PRN  2-Elevate heels to offload pressure.  3-Ehob cushion in chair when out of bed.  4-Moisturize skin daily with skin nourishing cream.  5-Turn/reposition q2h for pressure re-distribution on skin.       Wound 04/01/24 Pressure Injury Buttocks (Active)   Wound Image   04/02/24 0836   Wound Description Dry;Intact;Non-blanchable erythema 04/02/24 0836   Pressure Injury Stage 1 04/02/24 0836   Zee-wound Assessment Dry;Intact 04/02/24 0836   Wound Length (cm) 3 cm 04/02/24 0836   Wound Width (cm) 2 cm 04/02/24 0836   Wound Depth (cm) 0 cm 04/02/24 0836   Wound Surface Area (cm^2) 6 cm^2 04/02/24 0836   Wound Volume (cm^3) 0 cm^3 04/02/24 0836   Calculated Wound Volume (cm^3) 0 cm^3 04/02/24 0836    Drainage Amount None 04/02/24 0836   Non-staged Wound Description Not applicable 04/02/24 0836   Treatments Cleansed;Site care 04/02/24 0836   Dressing Moisture barrier 04/02/24 0836   Wound packed? No 04/02/24 0836   Dressing Changed New 04/02/24 0836   Patient Tolerance Tolerated well 04/02/24 0836            Call or tigertext with any questions  Wound Care will continue to follow    Annita LANDEROSN RN CWON  Wound and Ostomy care

## 2024-04-02 NOTE — PHYSICAL THERAPY NOTE
Physical Therapy Evaluation     Patient's Name: Paul Lewis    Admitting Diagnosis  Unspecified multiple injuries, initial encounter [T07.XXXA]    Problem List  Patient Active Problem List   Diagnosis    Acute encephalopathy    Leukocytosis    HONEY on CPAP    Fall    Primary hypertension    Type 2 diabetes mellitus without complication, without long-term current use of insulin (HCC)    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HCC)    Multiple sclerosis (HCC)    Urinary retention    Neck pain    Vitamin B deficiency       Past Medical History  Past Medical History:   Diagnosis Date    Diabetes (HCC)     Hypertension     Multiple sclerosis (HCC)        Past Surgical History  Past Surgical History:   Procedure Laterality Date    CEREBRAL ANEURYSM REPAIR          04/02/24 0850   PT Last Visit   PT Visit Date 04/02/24   Note Type   Note type Evaluation   Pain Assessment   Pain Assessment Tool 0-10   Pain Score No Pain   Restrictions/Precautions   Weight Bearing Precautions Per Order No   Other Precautions Cognitive;Chair Alarm;Bed Alarm;Multiple lines;Fall Risk   Home Living   Type of Home House   Home Layout One level;Performs ADLs on one level;Able to live on main level with bedroom/bathroom  (1 SARANYA)   Bathroom Shower/Tub Walk-in shower   Bathroom Equipment Grab bars in shower;Shower chair   Home Equipment Walker;Cane;Wheelchair-manual;Hospital bed  (reports being able to get in/out of WC at S level, occasionally requires Ax1; able to self propel WC)   Additional Comments admitted from STR, above is pt's baseline   Prior Function   Level of [place] Modified independent with wheelchair;Needs assistance with IADLS;Needs assistance with ADLs   Lives With Family  (brother and sister)   Receives Help From Family   IADLs Family/Friend/Other provides transportation;Family/Friend/Other provides meals;Family/Friend/Other provides medication management   Falls in the last 6 months 1 to 4 (@ least 1 leading to current admission)    General   Family/Caregiver Present No   Cognition   Arousal/Participation Alert   Orientation Level Oriented to person;Oriented to place;Disoriented to time   Following Commands Follows one step commands with increased time or repetition   Comments pt pleasant and cooperative   RLE Assessment   RLE Assessment   (functionally 2+/5)   LLE Assessment   LLE Assessment   (functionally 2+/5)   Bed Mobility   Supine to Sit 2  Maximal assistance   Additional items Assist x 2;HOB elevated;Bedrails;Increased time required   Sit to Supine Unable to assess   Additional Comments pt supine in bed upon arrival. Pt requiring mod A to correct posterior + L lateral lean. Pt left sitting OOB in recliner with alarm on and all needs within reach.   Transfers   Sit to Stand 3  Moderate assistance   Additional items Assist x 2;Increased time required;Verbal cues   Stand to Sit 3  Moderate assistance   Additional items Assist x 2;Increased time required;Verbal cues   Stand pivot 2  Maximal assistance   Additional items Assist x 2;Increased time required;Verbal cues   Additional Comments transfers with b/l HHA   Ambulation/Elevation   Gait pattern Not appropriate  (non- ambulatory at baseline)   Balance   Static Sitting Poor +   Dynamic Sitting Poor   Static Standing Poor -   Dynamic Standing Poor -   Ambulatory Zero   Endurance Deficit   Endurance Deficit Yes   Endurance Deficit Description generalized weakness, fatigue, deconditioning   Activity Tolerance   Activity Tolerance Patient tolerated treatment well   Medical Staff Made Aware OT Dorothy; co-session completed this date 2* increased medical complexity and multiple co-morbidities   Nurse Made Aware RN cleared   Assessment   Prognosis Fair   Problem List Decreased strength;Decreased range of motion;Decreased endurance;Impaired balance;Decreased mobility;Decreased cognition;Impaired judgement;Decreased safety awareness;Decreased skin integrity   Assessment Pt is a 73 y.o. male seen  for PT evaluation s/p admit to [facility] on 3/31/2024. Pt was admitted with a primary dx of: acute encephalopathy s/p fall.  PT now consulted for assessment of mobility and d/c needs. Pt with Up and OOB as tolerated  orders.  Pts current comorbidities effecting treatment include: leukocytosis, fall, HTN, type 2 DM, aneursym of basilar artery, MS, urinary retention, neck pain, vit B deficiency. Pt  has a past medical history of Diabetes (HCC), Hypertension, and Multiple sclerosis (HCC). Pts current clinical presentation is Unstable/ Unpredictable (high complexity) due to Ongoing medical management for primary dx, Increased reliance on more restrictive AD compared to baseline, Decreased activity tolerance compared to baseline, Fall risk, Increased assistance needed from caregiver at current time, Cog status, Trending lab values, Continuous pulse oximetry monitoring . Prior to admission, pt was @ STR. At baseline, pt resides with family in 1  with 1 [facility] and reports being S level/ Ax1 for transfers to , requires A with ADLs/ IADLs. Upon evaluation, pt currently is requiring max Ax2 for bed mobility; mod Ax2 for transfers + max Ax2 for SPT. Pt presents at PT eval functioning below baseline and currently w/ overall mobility deficits 2* to: BLE weakness, decreased ROM, impaired balance, decreased endurance, pain, decreased activity tolerance compared to baseline, decreased functional mobility tolerance compared to baseline, decreased safety awareness, impaired judgement, fall risk. Pt currently at a fall risk 2* to impairments listed above.  Pt will continue to benefit from skilled acute PT interventions to address stated impairments; to maximize functional mobility; for ongoing pt/ family training; and DME needs. At conclusion of PT session pt returned back in chair and chair alarm engaged with phone and call bell within reach. Pt denies any further questions at this time. The patient's AM-PAC Basic  Mobility Inpatient Short Form Raw Score is 10. A Raw score of less than or equal to 16 suggests the patient may benefit from discharge to post-acute rehabilitation services. Please also refer to the recommendation of the Physical Therapist for safe discharge planning. PT to continue to follow pt t/o hospital stay, recommend level II (mod) resource intensity upon hospital D/C.   Goals   Patient Goals to get OOB and drink his coffee   STG Expiration Date 04/16/24   Short Term Goal #1 STG 1. Pt will be able to perform bed mobility tasks with S level in order to improve overall functional mobility and assist in safe d/c. STG 2. Pt with sit EOB for at least 25 minutes at S level in order to strengthen abdominal musculature and assist in future transfers. STG 3. Pt will be able to perform functional transfer with S level in order to improve overall functional mobility and assist in safe d/c. STG 4. Pt will be able to navigate at least 150 feet with WC @ S level in order to improve overall functional mobility and assist in safe d/c. STG 5. Pt will improve sitting/standing static/dynamic balance 1/2 grade in order to improve functional mobility and assist in safe d/c.   PT Treatment Day 0   Plan   Treatment/Interventions ADL retraining;Functional transfer training;LE strengthening/ROM;Therapeutic exercise;Endurance training;Cognitive reorientation;Patient/family training;Equipment eval/education;Bed mobility;Spoke to nursing;Spoke to case management;OT   PT Frequency 2-3x/wk   Discharge Recommendation   Rehab Resource Intensity Level, PT II (Moderate Resource Intensity)   AM-PAC Basic Mobility Inpatient   Turning in Flat Bed Without Bedrails 2   Lying on Back to Sitting on Edge of Flat Bed Without Bedrails 2   Moving Bed to Chair 2   Standing Up From Chair Using Arms 2   Walk in Room 1   Climb 3-5 Stairs With Railing 1   Basic Mobility Inpatient Raw Score 10   [facility] Highest Level Of Mobility   [facility] Goal 4: Move to  chair/commode   JERRY-QUIQUE Achieved 4: Move to chair/commode   Modified Lowry Scale   Modified Lowry Scale 4           ROSALINA BasilioT

## 2024-04-02 NOTE — PROGRESS NOTES
Report called to receiving facility RN at [facility]. Pt discharged via stretcher with transport team.

## 2024-04-02 NOTE — PROGRESS NOTES
Patient:    MRN:  [id number]    Moshein Request ID:  [id number]    Level of care reserved:  Skilled Nursing Facility    Partner Reserved:  [facility] , [place] [address] [phone]    Clinical needs requested:    Geography searched:  10 miles around [id number]    Start of Service:    Request sent:  9:33am EDT on 4/2/2024 by Jania Whaley    Partner reserved:  9:58am EDT on 4/2/2024 by Jania Whaley    Choice list shared:  9:58am EDT on 4/2/2024 by Jania Whaley

## 2024-04-02 NOTE — CASE MANAGEMENT
Case Management Discharge Planning Note    Patient name Paul Lewis  Location [facility] 904/[facility] 904-01 MRN [id number]  : 1950 Date 2024       Current Admission Date: 3/31/2024  Current Admission Diagnosis:Acute encephalopathy   Patient Active Problem List    Diagnosis Date Noted    Acute encephalopathy 2024    Leukocytosis 2024    HONEY on CPAP 2024    Fall 2024    Primary hypertension 2024    Type 2 diabetes mellitus without complication, without long-term current use of insulin (HCC) 2024    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HCC) 2024    Multiple sclerosis (HCC) 2024    Urinary retention 2024    Neck pain 2024    Vitamin B deficiency 2024      LOS (days): 1  Geometric Mean LOS (GMLOS) (days): 2.5  Days to GMLOS:1.1     OBJECTIVE:  Risk of Unplanned Readmission Score: 10.27         Current admission status: Inpatient   Preferred Pharmacy:   [facility]/pharmacy #[id number] - [place] - [address] [address] [facility] [address]  Phone: [phone] Fax: [phone]    [place], [place] -  [address]   [address] [facility] [address]  Phone: [phone] Fax: [phone]     [facility]  [place], [place] - [address] [place], [place] [address]  Phone: [phone] Fax: [phone]    Primary Care Provider: Frankie Damon DO    Primary Insurance: [facility]  Secondary Insurance:     DISCHARGE DETAILS:    Discharge planning discussed with:: Pt and pt's brother in law-Emiliano  Freedom of Choice: Yes  Comments - Freedom of Choice: Pt agreeable to return to [facility] for STR  CM contacted family/caregiver?: Yes  Were Treatment Team discharge recommendations reviewed with patient/caregiver?: Yes  Did patient/caregiver verbalize understanding of patient care needs?: N/A- going to facility  Were patient/caregiver advised of the risks associated with not following Treatment Team  discharge recommendations?: Yes    Contacts  Patient Contacts: Emiliano Perales-brother in law  Relationship to Patient:: Family  Contact Method: Phone  Phone Number: [phone]  Reason/Outcome: Emergency Contact, Discharge Planning    Requested Home Health Care         Is the patient interested in HHC at discharge?: No    DME Referral Provided  Referral made for DME?: No    Other Referral/Resources/Interventions Provided:  Interventions: Short Term Rehab  Referral Comments: Pt agreeable to returning to [facility] for STR         Treatment Team Recommendation: Short Term Rehab  Discharge Destination Plan:: Short Term Rehab  Transport at Discharge : Barbara guzman     Number/Name of Dispatcher: Roundtrip     ETA of Transport (Date): 04/02/24  ETA of Transport (Time): 1830     Transfer Mode: Stretcher        IMM Given (Date):: 03/31/24  IMM Given to:: Patient          Accepting Facility Name, City & State : [facility]  Receiving Facility/Agency Phone Number: [phone]

## 2024-04-02 NOTE — DISCHARGE INSTR - OTHER ORDERS
Skin care plans:  1-Hydraguard to bilateral sacrum, buttock and heels BID and PRN  2-Elevate heels to offload pressure.  3-Ehob cushion in chair when out of bed.  4-Moisturize skin daily with skin nourishing cream.  5-Turn/reposition q2h for pressure re-distribution on skin.

## 2024-04-02 NOTE — OCCUPATIONAL THERAPY NOTE
Occupational Therapy Evaluation     Patient Name: Paul Lewis  Today's Date: 4/2/2024  Problem List  Principal Problem:    Acute encephalopathy  Active Problems:    Leukocytosis    HONEY on CPAP    Fall    Primary hypertension    Type 2 diabetes mellitus without complication, without long-term current use of insulin (HCC)    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HCC)    Multiple sclerosis (HCC)    Urinary retention    Neck pain    Vitamin B deficiency    Past Medical History  Past Medical History:   Diagnosis Date    Diabetes (HCC)     Hypertension     Multiple sclerosis (HCC)      Past Surgical History  Past Surgical History:   Procedure Laterality Date    CEREBRAL ANEURYSM REPAIR          04/02/24 0849   OT Last Visit   OT Visit Date 04/02/24   Note Type   Note type Evaluation   Pain Assessment   Pain Assessment Tool 0-10   Pain Score No Pain   Restrictions/Precautions   Weight Bearing Precautions Per Order No   Other Precautions Cognitive;Chair Alarm;Bed Alarm;Multiple lines;Fall Risk   Home Living   Type of Home House   Home Layout One level;Performs ADLs on one level;Able to live on main level with bedroom/bathroom  (1 SARANYA)   Bathroom Shower/Tub Walk-in shower   Bathroom Toilet Standard   Bathroom Equipment Grab bars in shower;Shower chair;Toilet raiser;Grab bars around toilet   Home Equipment Walker;Cane;Wheelchair-manual;Hospital bed   Additional Comments Pt admitted from [facility] at [facility]. Above home setup information is pt's baseline information.   Prior Function   Level of [place] Modified independent with wheelchair;Needs assistance with ADLs;Needs assistance with IADLS   Lives With Family  (brother and sister)   Receives Help From Family   IADLs Family/Friend/Other provides transportation;Family/Friend/Other provides meals;Family/Friend/Other provides medication management   Falls in the last 6 months 1 to 4   Vocational Retired   Lifestyle   Autonomy Pt reports requiring A w/ ADLs and IADLs and mod  I w/ WC for fxnl mobility at baseline; - driving. Pt also reports SBA for SPT in/out of WC at baseline.   Reciprocal Relationships Pt lives w/ his brother and sister.   Service to Others Pt is retired.   Intrinsic Gratification Pt enjoys drinking coffee.   General   Family/Caregiver Present No   ADL   Where Assessed Edge of bed   Eating Assistance 5  Supervision/Setup   Grooming Assistance 4  Minimal Assistance   UB Bathing Assistance 3  Moderate Assistance   LB Bathing Assistance 2  Maximal Assistance   UB Dressing Assistance 3  Moderate Assistance   LB Dressing Assistance 2  Maximal Assistance   Toileting Assistance  2  Maximal Assistance   Bed Mobility   Supine to Sit 2  Maximal assistance   Additional items Assist x 2;HOB elevated;Bedrails;Increased time required;Verbal cues;LE management   Sit to Supine Unable to assess   Additional Comments Pt required mod Ax1 to maintain sitting balance EOB; noted L lateral lean. Pt seated OOB in chair at end of OT evaluation w/ all needs within reach.   Transfers   Sit to Stand 3  Moderate assistance   Additional items Assist x 2;Increased time required;Verbal cues   Stand to Sit 3  Moderate assistance   Additional items Assist x 2;Increased time required;Verbal cues   Stand pivot 2  Maximal assistance   Additional items Assist x 2;Increased time required;Verbal cues   Additional Comments completed w/ b/l HHA; x2 STS transfers t/o session   Functional Mobility   Additional Comments NT; will continue to assess   Balance   Static Sitting Poor +   Dynamic Sitting Poor   Static Standing Poor -   Dynamic Standing Poor -   Ambulatory Zero   Activity Tolerance   Activity Tolerance Patient tolerated treatment well   Medical Staff Made Aware PTAnanya, due to pt's medical complexity and multiple comorbidities   Nurse Made Aware RN clearance prior to session   RUE Assessment   RUE Assessment X  (limited shoulder flexion AROM and generalized weakness; able to self feed)   MAGDI  Assessment   LUE Assessment X  (limited shoulder flexion AROM and generalized weakness; able to self feed)   Hand Function   Gross Motor Coordination Impaired   Fine Motor Coordination Functional   Cognition   Arousal/Participation Alert;Responsive;Cooperative   Attention Attends with cues to redirect   Orientation Level Oriented to person;Oriented to place;Oriented to situation;Disoriented to time   Memory Decreased recall of precautions;Decreased recall of recent events;Decreased short term memory   Following Commands Follows one step commands with increased time or repetition   Comments Pt was pleasant, cooperative, and willing to participate in OT evaluation.   Assessment   Limitation Decreased ADL status;Decreased UE ROM;Decreased UE strength;Decreased endurance;Decreased self-care trans;Decreased high-level ADLs   Assessment Pt is a 73 y.o. male seen for OT evaluation s/p admission to [facility] on 3/31/2024 s/p fall from bed at [facility]; + HS. Pt diagnosed with Acute encephalopathy. Pt has a significant PMH impacting occupational performance including: Diabetes (HCC), Hypertension, and Multiple sclerosis ([facility]). Pt with active OT evaluation and treatment orders and activity orders. Pt was admitted from [facility] at [facility]. At baseline, pt living with his brother and sister in a 1 SH w/ 1 SARANYA. Pt reports requiring A w/ ADLs and IADLs and mod I w/ WC for fxnl mobility at baseline; - driving. Pt also reports SBA for SPT in/out of WC at baseline. Pt agreeable and willing to participate in OT evaluation. During evaluation, pt was S for eating, min A for grooming tasks, mod A for UB ADLs, and max A for LB ADLs and toileting. Pt also required max Ax2 for bed mobility and SPT from EOB to drop arm recliner chair and mod Ax2 for STS transfers. Performance deficits that affect the pt’s occupational performance during the initial evaluation include decreased ADL status, decreased activity tolerance, decreased  endurance, decreased sitting tolerance, decreased sitting balance, decreased standing tolerance, decreased standing balance, decreased transfer skills, decreased fxnl mobility, generalized weakness , and decreased UE ROM/strength . Based on pt’s functional performance and deficits the following occupations will be addressed in OT treatments in order to maximize pt’s independence and overall occupational performance: grooming, bathing/showering, toileting and toilet hygiene, dressing, and functional mobility. Goals are listed below.  From OT perspective, recommend Level II (Moderate Resource Intensity) upon d/c when pt medically stable to d/c from acute care. Will continue to follow.   Goals   Patient Goals to get OOB and drink his coffee   LTG Time Frame 10-14   Plan   Treatment Interventions ADL retraining;Functional transfer training;UE strengthening/ROM;Endurance training;Patient/family training;Equipment evaluation/education;Compensatory technique education;Continued evaluation;Energy conservation;Activityengagement   Goal Expiration Date 04/16/24   OT Treatment Day 0   OT Frequency 2-3x/wk   Discharge Recommendation   Rehab Resource Intensity Level, OT II (Moderate Resource Intensity)   AM-PAC Daily Activity Inpatient   Lower Body Dressing 2   Bathing 2   Toileting 2   Upper Body Dressing 2   Grooming 3   Eating 3   Daily Activity Raw Score 14   Daily Activity Standardized Score (Calc for Raw Score >=11) 33.39   AM-PAC Applied Cognition Inpatient   Following a Speech/Presentation 3   Understanding Ordinary Conversation 4   Taking Medications 3   Remembering Where Things Are Placed or Put Away 4   Remembering List of 4-5 Errands 3   Taking Care of Complicated Tasks 3   Applied Cognition Raw Score 20   Applied Cognition Standardized Score 41.76       The patient's raw score on the AM-PAC Daily Activity Inpatient Short Form is 14. A raw score of less than 19 suggests the patient may benefit from discharge to  post-acute rehabilitation services. Please refer to the recommendation of the Occupational Therapist for safe discharge planning.    Goals: OTR to further assess pt's fxnl mobility     - Pt will complete UB ADLs w/ S to maximize independence and return home.    - Pt will complete LB ADLs w/ mod A to maximize independence and return home.     - Pt will complete toileting routine (transfers, hygiene, and clothing management) w/ mod A to maximize independence and return to prior level of function.    - Pt will complete bed mobility supine >< sit w/ Ax1 to maximize independence and return home.    - Pt will transfer to bed, chair, and toilet w/ Ax1 using AD / DME as needed to maximize independence and reduce burden of care.     - Pt will increase activity tolerance (and sitting tolerance) by eating all meals OOB in the chair.     - Pt will increase standing tolerance to 2-3 minutes to maximize independence w/ grooming tasks standing at the sink.     - Pt will tolerate therapeutic activities for greater than 30 minutes in order to increase tolerance for functional activities.     - Pt will participate in ongoing OT evaluation of cognitive skills to assist with safe d/c planning/recommendations.      Dorothy Steele MS, OTR/L

## 2024-04-02 NOTE — PLAN OF CARE
Problem: OCCUPATIONAL THERAPY ADULT  Goal: Performs self-care activities at highest level of function for planned discharge setting.  See evaluation for individualized goals.  Description: Treatment Interventions: ADL retraining, Functional transfer training, UE strengthening/ROM, Endurance training, Patient/family training, Equipment evaluation/education, Compensatory technique education, Continued evaluation, Energy conservation, Activityengagement          See flowsheet documentation for full assessment, interventions and recommendations.   Note: Limitation: Decreased ADL status, Decreased UE ROM, Decreased UE strength, Decreased endurance, Decreased self-care trans, Decreased high-level ADLs     Assessment: Pt is a 73 y.o. male seen for OT evaluation s/p admission to [facility] on 3/31/2024 s/p fall from bed at [facility]; + HS. Pt diagnosed with Acute encephalopathy. Pt has a significant PMH impacting occupational performance including: Diabetes (HCC), Hypertension, and Multiple sclerosis (HCC). Pt with active OT evaluation and treatment orders and activity orders. Pt was admitted from [facility] at [facility]. At baseline, pt living with his brother and sister in a 1 SH w/ 1 SARANYA. Pt reports requiring A w/ ADLs and IADLs and mod I w/ WC for fxnl mobility at baseline; - driving. Pt also reports SBA for SPT in/out of WC at baseline. Pt agreeable and willing to participate in OT evaluation. During evaluation, pt was S for eating, min A for grooming tasks, mod A for UB ADLs, and max A for LB ADLs and toileting. Pt also required max Ax2 for bed mobility and SPT from EOB to drop arm recliner chair and mod Ax2 for STS transfers. Performance deficits that affect the pt’s occupational performance during the initial evaluation include decreased ADL status, decreased activity tolerance, decreased endurance, decreased sitting tolerance, decreased sitting balance, decreased standing tolerance, decreased standing balance,  decreased transfer skills, decreased fxnl mobility, generalized weakness , and decreased UE ROM/strength . Based on pt’s functional performance and deficits the following occupations will be addressed in OT treatments in order to maximize pt’s independence and overall occupational performance: grooming, bathing/showering, toileting and toilet hygiene, dressing, and functional mobility. Goals are listed below.  From OT perspective, recommend Level II (Moderate Resource Intensity) upon d/c when pt medically stable to d/c from acute care. Will continue to follow.     Rehab Resource Intensity Level, OT: II (Moderate Resource Intensity)

## 2024-04-02 NOTE — CASE MANAGEMENT
Case Management Progress Note    Patient name Paul Lewis  Location [facility] 904/[facility] 904-01 MRN [id number]  : 1950 Date 2024       LOS (days): 1  Geometric Mean LOS (GMLOS) (days):   Days to GMLOS:        OBJECTIVE:        Current admission status: Inpatient  Preferred Pharmacy:   [id number] [place] - [address] [address]  Phone: [phone] Fax: [phone]    [place], [place] -  [address] [address] [address] [facility] [address]  Phone: [phone] Fax: [phone]     [facility] [place] - [address] [place] [address]  Phone: [phone] Fax: [phone]    Primary Care Provider: Frankie Damon DO    Primary Insurance: Richard Pauer - 3P [facility]  Secondary Insurance:     PROGRESS NOTE: CM left voicemail for Huong at [facility] regarding STR and for pt to return to [facility].  Once [facility] gives verbal that pt can return, pt can be discharged back to facility.

## 2024-04-02 NOTE — PROGRESS NOTES
Progress Note - Paul Lewis 73 y.o. male MRN: [id number]    [facility] Encounter: [id number]      Assessment/Plan:  Ambulatory dysfunction  Exacerbated by fall  At risk for falls secondary to age, hx of fall, gait instability, polypharmacy, visual impairment, MS  Fall precautions  PT/OT  Increased physical exercise, recommend balance and strengthening exercises  Rehab post hospitalization     Neck pain  S/p fall  CT imaging negative acute injury  Scheduled acetaminophen  Lidoderm patch  Aqua K     Suprapubic catheter secondary to neurogenic bladder  Upsized in [place] 3/28/24  Follow up with urology as outpatient     Frailty  Clinical Frail Scale: 6- Moderately Frail  Need help with all outside activities  Need help with stairs and bathing  May need assistance with dressing   Was at rehab, previously at [facility]  Albumin 3.6, maintain protein in diet  PT/OT  Continue supportive care     Cognitive screening  No reported prior memory issues  TSH 2.475 (4/1/24)  Vitamin B 12 level 190 (4/1/24) recommend vitamin B 12 supplementation, goal level greater than 400  CT head (3/31/24) mild microangiopathic changes  Keep physically, mentally and socially active     Delirium precautions  Baseline: alert and oriented x 3  Provide frequent redirection, reorientation, distraction techniques  Avoid deliriogenic medications such as tramadol, benzodiazepines, anticholinergics,  Benadryl  Treat pain, See geriatric pain protocol  Monitor for constipation and urinary retention  Encourage early and frequent moblization, OOB  Encourage Hydration/ Nutrition  Implement sleep hygiene, limit night time interuptions, group activities     Insomnia  Related to hospitalization  First line is behavioral therapy  Avoid sedative hypnotics such as benzodiazepines and benadryl  Encourage staying awake during the day  Encourage daytime activity, morning exercise  Decrease or eliminate day time naps  Avoid caffeine especially during late  "afternoon and evening hours  Establish a night time routine     Subjective:   He is oob in recliner chair, alert and oriented, calm and cooperative. He ate 100% of breakfast. He reports he slept well last night.  Rounded with nursing who reports no issues.  Rounded with internal med.     Objective:     Vitals: Blood pressure 140/69, pulse 67, temperature 98 °F (36.7 °C), temperature source Oral, resp. rate 17, height 5' 7\" (1.702 m), weight 69.3 kg (152 lb 12.5 oz), SpO2 95%.,Body mass index is 23.93 kg/m².      Intake/Output Summary (Last 24 hours) at 4/2/2024 1423  Last data filed at 4/2/2024 1401  Gross per 24 hour   Intake 50 ml   Output 1275 ml   Net -1225 ml       Physical Exam:   General : NAD  HEENT : MMM   Heart : Normal rate, no murmur rub or gallop  Lungs : CTA no wheezes, rales or rhonchi  Abdomen : Soft, NT/ND, BS auscultated in all 4 quads.   Ext :  no edema  Skin : Pink, warm, dry, age appropriate turgor and mobility  Neuro : Nonfocal  Psych : Alert and O x 3     Invasive Devices       Peripheral Intravenous Line  Duration             Peripheral IV 04/01/24 Right Antecubital 1 day              Drain  Duration             Suprapubic Catheter -- days                    Lab, Imaging and other studies: I have personally reviewed pertinent reports.    VTE Pharmacologic Prophylaxis: Sequential compression device (Venodyne)   VTE Mechanical Prophylaxis: sequential compression device   "

## 2024-04-02 NOTE — PLAN OF CARE
Problem: PHYSICAL THERAPY ADULT  Goal: Performs mobility at highest level of function for planned discharge setting.  See evaluation for individualized goals.  Description: Treatment/Interventions: ADL retraining, Functional transfer training, LE strengthening/ROM, Therapeutic exercise, Endurance training, Cognitive reorientation, Patient/family training, Equipment eval/education, Bed mobility, Spoke to nursing, Spoke to case management, OT          See flowsheet documentation for full assessment, interventions and recommendations.  Note: Prognosis: Fair  Problem List: Decreased strength, Decreased range of motion, Decreased endurance, Impaired balance, Decreased mobility, Decreased cognition, Impaired judgement, Decreased safety awareness, Decreased skin integrity  Assessment: Pt is a 73 y.o. male seen for PT evaluation s/p admit to [facility] on 3/31/2024. Pt was admitted with a primary dx of: acute encephalopathy s/p fall.  PT now consulted for assessment of mobility and d/c needs. Pt with Up and OOB as tolerated  orders.  Pts current comorbidities effecting treatment include: leukocytosis, fall, HTN, type 2 DM, aneursym of basilar artery, MS, urinary retention, neck pain, vit B deficiency. Pt  has a past medical history of Diabetes (HCC), Hypertension, and Multiple sclerosis (HCC). Pts current clinical presentation is Unstable/ Unpredictable (high complexity) due to Ongoing medical management for primary dx, Increased reliance on more restrictive AD compared to baseline, Decreased activity tolerance compared to baseline, Fall risk, Increased assistance needed from caregiver at current time, Cog status, Trending lab values, Continuous pulse oximetry monitoring . Prior to admission, pt was @ STR. At baseline, pt resides with family in 1  with 1 [facility] and reports being S level/ Ax1 for transfers to , requires A with ADLs/ IADLs. Upon evaluation, pt currently is requiring max Ax2 for bed mobility; mod  Ax2 for transfers + max Ax2 for SPT. Pt presents at PT eval functioning below baseline and currently w/ overall mobility deficits 2* to: BLE weakness, decreased ROM, impaired balance, decreased endurance, pain, decreased activity tolerance compared to baseline, decreased functional mobility tolerance compared to baseline, decreased safety awareness, impaired judgement, fall risk. Pt currently at a fall risk 2* to impairments listed above.  Pt will continue to benefit from skilled acute PT interventions to address stated impairments; to maximize functional mobility; for ongoing pt/ family training; and DME needs. At conclusion of PT session pt returned back in chair and chair alarm engaged with phone and call bell within reach. Pt denies any further questions at this time. The patient's AM-PAC Basic Mobility Inpatient Short Form Raw Score is 10. A Raw score of less than or equal to 16 suggests the patient may benefit from discharge to post-acute rehabilitation services. Please also refer to the recommendation of the Physical Therapist for safe discharge planning. PT to continue to follow pt t/o hospital stay, recommend level II (mod) resource intensity upon hospital D/C.        Rehab Resource Intensity Level, PT: II (Moderate Resource Intensity)    See flowsheet documentation for full assessment.

## 2024-04-02 NOTE — UTILIZATION REVIEW
NOTIFICATION OF INPATIENT ADMISSION   AUTHORIZATION REQUEST   SERVICING FACILITY:   [facility]  Address: [address] [place] [address]  Tax ID: [id number]  NPI: [id number] ATTENDING PROVIDER:  Attending Name and NPI#: Wil Pizano [[id number]]  Address: [address] [place] [address]  Phone: [phone]   ADMISSION INFORMATION:  Place of Service: Inpatient [facility]  Place of Service Code: 21  Inpatient Admission Date/Time: 4/1/24  3:23 AM  Discharge Date/Time: No discharge date for patient encounter.  Admitting Diagnosis Code/Description:  Unspecified multiple injuries, initial encounter [T07.XXXA]     UTILIZATION REVIEW CONTACT:  Isrrael Rosado Utilization   Network Utilization Review Department  Phone: [phone]  Fax: [phone]  Email: [email or web address]  Contact for approvals/pending authorizations, clinical reviews, and discharge.     PHYSICIAN ADVISORY SERVICES:  Medical Necessity Denial & Qvxc-un-Elql Review  Phone: [phone]  Fax: [phone]  Email: [email or web address]     DISCHARGE SUPPORT TEAM:  For Patients Discharge Needs & Updates  Phone: [phone] opt. 2 Fax: [phone]  Email: [email or web address]

## 2024-04-02 NOTE — RESTORATIVE TECHNICIAN NOTE
Restorative Technician Note      Patient Name: Paul Lewis     Restorative Tech Visit Date: 04/02/24  Note Type: Mobility  Patient Position Upon Consult: Bedside chair  Activity Performed: Repositioned  Patient Position at End of Consult: Bedside chair; All needs within reach  Nurse Communication: Nurse aware of consult, application of brace    Rodriguez Munoz, Restorative Technician

## 2024-04-02 NOTE — DISCHARGE SUMMARY
Discharge Summary - [facility] Internal Medicine    Patient Information: Paul Lewis 73 y.o. male MRN: [id number]  Unit/Bed#: [facility] 904-01 Encounter: [id number]    Discharging Physician / Practitioner: LUIS A Gama  PCP: Frankie Damon DO  Admission Date: 3/31/2024  Discharge Date: 04/02/24    Reason for Admission: fall, acute encephalopathy    Discharge Diagnoses:     Principal Problem:    Acute encephalopathy  Active Problems:    Leukocytosis    Fall    Neck pain    Urinary retention    Type 2 diabetes mellitus without complication, without long-term current use of insulin (HCC)    Vitamin B deficiency    Primary hypertension    HONEY on CPAP    Multiple sclerosis (HCC)    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HCC)  Resolved Problems:    * No resolved hospital problems. *      Consultations During Hospital Stay:  Geriatrics  Trauma  Case management  PT/OT      Procedures Performed:     EEG This is an abnormal 28 minutes awake and drowsy EEG due to excessive theta and delta activity during wakefulness and generalized intermittent rhythmic delta activity (GRDA). These findings are etiologically nonspecific for moderate diffuse cerebral dysfunction.    Significant Findings / Test Results:     CTH without No acute intracranial hemorrhage or depressed calvarial fracture. Senescent changes and other ancillary findings detailed above.   CT cervical spine Multilevel degenerative changes without acute cervical spine fracture or traumatic malalignment.   CXR negative  Pelvic xray negative  MRI cervical spine Severely motion degraded, incomplete MR of the cervical spine, verging on nondiagnostic.   Labs remarkable for mild leukocytosis (15.741) downtrending to 11.15 on day of discharge  TSH 2.475  B 12 192, folate greater than 22    Incidental Findings:   None      Test Results Pending at Discharge (will require follow up):   none     Outpatient Tests Requested:  Outpatient f/u with PCP    Complications:  none  "    Hospital Course:     Paul Lewis is a 73 y.o. male patient with a past medical history of urinary retention status post suprapubic catheter, type 2 diabetes, hypertension, HONEY on CPAP nightly who originally presented to the hospital on 3/31/2024 due to witnessed fall at rehab.  Patient was a trauma alert with all traumatic imaging negative.  He was also noted to be acutely encephalopathic which has resolved. Unclear etiology.  EEG abnormal however nonspecific.  No epileptic discharges.  Mild leukocytosis on admission which has been downtrending, no evidence of infection.  Vitamin B12 low at 192, goal to keep greater than 400, started on supplementation.    Patient is stable for discharge back to rehab facility today.  Continue supportive care for musculoskeletal neck pain.  Outpatient follow-up with PCP.    Condition at Discharge: stable     Discharge Day Visit / Exam:     Subjective:  OOB in chair. Neck pain is improved today. Agreeable to return to rehab.     Vitals: Blood Pressure: 140/69 (04/02/24 0721)  Pulse: 67 (04/02/24 0721)  Temperature: 98 °F (36.7 °C) (04/02/24 0721)  Temp Source: Oral (04/02/24 0721)  Respirations: 17 (04/02/24 0721)  Height: 5' 7\" (170.2 cm) (04/01/24 0331)  Weight - Scale: 69.3 kg (152 lb 12.5 oz) (03/31/24 2118)  SpO2: 95 % (04/02/24 0721)  Exam:   Physical Exam  Vitals and nursing note reviewed.   Constitutional:       General: He is not in acute distress.  Cardiovascular:      Rate and Rhythm: Normal rate.   Abdominal:      Tenderness: There is no abdominal tenderness.   Genitourinary:     Comments: SPT noted   Musculoskeletal:         General: No swelling.      Cervical back: Tenderness (posterior, but improving) present.   Skin:     General: Skin is warm.   Neurological:      Mental Status: He is alert and oriented to person, place, and time.      Comments: Oriented x 3 but forgetful   Psychiatric:         Mood and Affect: Mood normal.         Discussion with Family: " patient and brother over phone     Discharge instructions/Information to patient and family:   See after visit summary for information provided to patient and family.      Provisions for Follow-Up Care:  See after visit summary for information related to follow-up care and any pertinent home health orders.      Disposition:     Other Skilled Nursing Facility at [facility]     For Discharges to [facility] SNF:   Not Applicable to this Patient - Not Applicable to this Patient    Planned Readmission: no     Discharge Statement:  I spent 40 minutes discharging the patient. This time was spent on the day of discharge. I had direct contact with the patient on the day of discharge. Greater than 50% of the total time was spent examining patient, answering all patient questions, arranging and discussing plan of care with patient as well as directly providing post-discharge instructions.  Additional time then spent on discharge activities.    Discharge Medications:  See after visit summary for reconciled discharge medications provided to patient and family.      ** Please Note: This note has been constructed using a voice recognition system **

## 2024-04-03 NOTE — UTILIZATION REVIEW
NOTIFICATION OF ADMISSION DISCHARGE   This is a Notification of Discharge from [facility]. Please be advised that this patient has been discharge from our facility. Below you will find the admission and discharge date and time including the patient’s disposition.   UTILIZATION REVIEW CONTACT:  Isrrael Rosado  Utilization   Network Utilization Review Department  Phone: [phone] x carefully listen to the prompts. All voicemails are confidential.  Email: [email or web address]     ADMISSION INFORMATION  PRESENTATION DATE: 3/31/2024  9:09 PM  OBERVATION ADMISSION DATE:   INPATIENT ADMISSION DATE: 4/1/24  3:23 AM   DISCHARGE DATE: 4/2/2024  7:14 PM   DISPOSITION:Released to SNF/TCU/SNU Facility    Network Utilization Review Department  ATTENTION: Please call with any questions or concerns to [phone] and carefully listen to the prompts so that you are directed to the right person. All voicemails are confidential.   For Discharge needs, contact Care Management DC Support Team at [phone] opt. 2  Send all requests for admission clinical reviews, approved or denied determinations and any other requests to dedicated fax number below belonging to the campus where the patient is receiving treatment. List of dedicated fax numbers for the Facilities:  FACILITY NAME UR FAX NUMBER   ADMISSION DENIALS (Administrative/Medical Necessity) [phone]   DISCHARGE SUPPORT TEAM ([facility]) [phone]   PARENT CHILD HEALTH (Maternity/NICU/Pediatrics) [phone]   [facility] [phone]   [facility] [phone]   [facility] [phone]   [facility] [phone]   [facility] [phone]   [facility] [phone]   [facility] [phone]   [facility]  [phone]   [facility] [phone]   [facility] [phone]   [facility] [phone]   [facility] [phone]

## 2024-04-06 ENCOUNTER — HOSPITAL ENCOUNTER (EMERGENCY)
Facility: HOSPITAL | Age: 74
Discharge: HOME | End: 2024-04-06
Attending: EMERGENCY MEDICINE
Payer: MEDICARE

## 2024-04-06 VITALS
TEMPERATURE: 98.7 F | DIASTOLIC BLOOD PRESSURE: 54 MMHG | OXYGEN SATURATION: 95 % | SYSTOLIC BLOOD PRESSURE: 112 MMHG | HEART RATE: 87 BPM | RESPIRATION RATE: 16 BRPM

## 2024-04-06 DIAGNOSIS — T83.010A SUPRAPUBIC CATHETER DYSFUNCTION, INITIAL ENCOUNTER (HCC): Primary | ICD-10-CM

## 2024-04-06 PROCEDURE — 99283 EMERGENCY DEPT VISIT LOW MDM: CPT | Performed by: EMERGENCY MEDICINE

## 2024-04-06 PROCEDURE — 99284 EMERGENCY DEPT VISIT MOD MDM: CPT

## 2024-04-06 NOTE — ED PROVIDER NOTES
History  Chief Complaint   Patient presents with    Urinary Catheter Problem     Per EMS patient coming from [facility] c/o abdominal pain - pt with suprapubic catheter with no drainage in bag. Per EMS staff at [facility] attempted to flush without any return.     HPI  Patient is a 73 y.o. male with history of chronic suprapubic catheter presenting to the emergency department for abdominal pain. Patient states that his suprapubic catheter has not been draining all day today. Complains of having abdominal pain, no other complaints at this time. States that the facility he came from did not try to drain / flush the catheter. Denies having fevers, chills, N/V.     Prior to Admission Medications   Prescriptions Last Dose Informant Patient Reported? Taking?   ALPRAZolam (XANAX) 0.25 mg tablet  Self Yes No   Sig: Take 0.25 mg by mouth 2 (two) times a day as needed for anxiety or sleep    Dulaglutide (Trulicity) 0.75 MG/0.5ML SOPN  Self Yes No   Sig: Inject 0.75 mg under the skin once a week   Ergocalciferol (VITAMIN D2 PO)  Self Yes No   Sig: Take 50,000 Units by mouth once a week   acetaminophen (TYLENOL) 325 mg tablet   No No   Sig: Take 2 tablets (650 mg total) by mouth every 6 (six) hours as needed for mild pain   albuterol (2.5 mg/3 mL) 0.083 % nebulizer solution   No No   Sig: Take 3 mL (2.5 mg total) by nebulization every 6 (six) hours as needed for wheezing or shortness of breath   amLODIPine-atorvastatin (CADUET) 10-80 MG per tablet  Self Yes No   Sig: Take 1 tablet by mouth daily   bisacodyl (DULCOLAX) 10 mg suppository   No No   Sig: Insert 1 suppository (10 mg total) into the rectum daily as needed for constipation   clopidogrel (PLAVIX) 75 mg tablet  Self No No   Sig: Take 1 tablet (75 mg total) by mouth daily   gabapentin (NEURONTIN) 300 mg capsule  Self No No   Sig: Take 1 capsule (300 mg total) by mouth 2 (two) times a day   gabapentin (NEURONTIN) 300 mg capsule   No No   Sig: Take 2 capsules (600 mg  total) by mouth daily at bedtime   latanoprost (XALATAN) 0.005 % ophthalmic solution  Self Yes No   Sig: Administer 1 drop to both eyes daily at bedtime   melatonin 3 mg  Self Yes No   Sig: Take 3 mg by mouth daily at bedtime as needed   methenamine hippurate (HIPREX) 1 g tablet   No No   Sig: Take 1 tablet (1 g total) by mouth 2 (two) times a day with meals   pantoprazole (PROTONIX) 40 mg tablet   No No   Sig: TAKE 1 TABLET TWICE DAILY 30 MINUTES BEFORE BREAKFAST AND DINNER.   polyethylene glycol (MIRALAX) 17 g packet   No No   Sig: Take 17 g by mouth daily   potassium citrate (UROCIT-K) 10 mEq   No No   Sig: Take 2 tablets (20 mEq total) by mouth in the morning   propranolol (INDERAL LA) 60 mg 24 hr capsule   Yes No   Sig: Take 60 mg by mouth daily   senna-docusate sodium (SENOKOT S) 8.6-50 mg per tablet   Yes No   Sig: Take 2 tablets by mouth daily at bedtime   sertraline (ZOLOFT) 25 mg tablet   Yes No   Sig: Take 25 mg by mouth daily at bedtime      Facility-Administered Medications: None       Past Medical History:   Diagnosis Date    Acute laryngitis     Acute nonsuppurative otitis media, unspecified laterality     Arm weakness     Arthritis     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[facility])     Bladder infection     Bronchitis     Constipation     Cough     Diabetes mellitus ([facility])     Dizziness     Dysfunction of eustachian tube     Erectile dysfunction of non-organic origin     Fatigue     Glaucoma     Hiatal hernia     Hypertension     Imbalance     Leg muscle spasm     MS (multiple sclerosis) ([facility])     Nephrolithiasis     Neurogenic bladder     No natural teeth     Sinus pain     Spinal stenosis     Strain of thoracic region     Stroke ([facility])     Suprapubic catheter ([facility])        Past Surgical History:   Procedure Laterality Date    APPENDECTOMY      BRAIN SURGERY      Coil placed in aneurysm    CYSTOSCOPY      CYSTOSCOPY      CYSTOSCOPY  06/11/2018    CYSTOSCOPY  01/15/2021    EYE SURGERY      transscleral  cyclophotocoagulation noncontact YAG laser    IR SUPRAPUBIC CATHETER CHECK/CHANGE/REINSERTION/UPSIZE  3/28/2024    MYRINGOTOMY      with ventilation tube insertion    MA LITHOLAPAXY SMPL/SM <2.5 CM N/A 2019    Procedure: CYSTOSCOPY, holmium laser litholapaxy of bladder stones, EXCHANGE OF SP TUBE;  Surgeon: Javy Jeffries MD;  Location: [facility];  Service: Urology    SUPRAPUBIC CATHETER INSERTION         Family History   Problem Relation Age of Onset    Heart attack Mother     Stroke Mother     Heart attack Father     Anuerysm Father         In Stomach      I have reviewed and agree with the history as documented.    E-Cigarette/Vaping    E-Cigarette Use Never User      E-Cigarette/Vaping Substances    Nicotine No     THC No     CBD No     Flavoring No     Other No     Unknown No      Social History     Tobacco Use    Smoking status: Former     Current packs/day: 0.00     Average packs/day: 0.5 packs/day for 31.0 years (15.5 ttl pk-yrs)     Types: Cigarettes     Start date:      Quit date:      Years since quittin.2    Smokeless tobacco: Never   Vaping Use    Vaping status: Never Used   Substance Use Topics    Alcohol use: Not Currently    Drug use: No        Review of Systems   Constitutional:  Negative for fever.   Respiratory:  Negative for cough.    Cardiovascular:  Negative for chest pain.   Gastrointestinal:  Positive for abdominal distention and abdominal pain.   Genitourinary:  Positive for decreased urine volume.       Physical Exam  ED Triage Vitals [24 1518]   Temperature Pulse Respirations Blood Pressure SpO2   98.7 °F (37.1 °C) 91 16 154/76 96 %      Temp Source Heart Rate Source Patient Position - Orthostatic VS BP Location FiO2 (%)   Oral Monitor Sitting Right arm --      Pain Score       10 - Worst Possible Pain             Orthostatic Vital Signs  Vitals:    24 1518 24 1530   BP: 154/76 112/54   Pulse: 91 87   Patient Position - Orthostatic VS: Sitting         Physical Exam  Vitals and nursing note reviewed.   Constitutional:       Appearance: Normal appearance.   HENT:      Head: Normocephalic and atraumatic.      Mouth/Throat:      Mouth: Mucous membranes are moist.   Eyes:      Conjunctiva/sclera: Conjunctivae normal.   Cardiovascular:      Rate and Rhythm: Normal rate and regular rhythm.      Pulses: Normal pulses.      Heart sounds: Normal heart sounds.   Pulmonary:      Effort: Pulmonary effort is normal.      Breath sounds: Normal breath sounds.   Abdominal:      General: There is distension.      Palpations: Abdomen is soft.      Tenderness: There is abdominal tenderness.   Musculoskeletal:         General: No tenderness.      Cervical back: Neck supple.   Skin:     General: Skin is warm and dry.      Capillary Refill: Capillary refill takes less than 2 seconds.   Neurological:      General: No focal deficit present.      Mental Status: He is alert. Mental status is at baseline.   Psychiatric:         Mood and Affect: Mood normal.         ED Medications  Medications - No data to display    Diagnostic Studies  Results Reviewed       None                   No orders to display         Procedures  Procedures      ED Course  ED Course as of 04/06/24 1615   Sat Apr 06, 2024   1542 >2L drained from patients suprapubic catheter, pain resolved. Patient has no complaints at this time. Will monitor until finished draining   1611 2350 emptied from suprapubic catheter. Pain resolved. Will discharge.                                        Medical Decision Making  Patient is a 73 y.o. male with PMH of chronic suprapubic catheter who presents to the ED with blocked catheter.    Vital signs stable, afebrile. On exam distended tender abdomen.    History and physical exam most consistent with blocked suprapubic catheter.     Plan: Will drain dela cruz catheter    View ED course above for further discussion on patient workup.     On review of previous records reviewed PCP records  "from 3/29/24 - patient with chronic suprapubic catheter due to MS and bedbound status, upsized by IR on 3/28.    All labs reviewed and utilized in the medical decision making process  All radiology studies independently viewed by me and interpreted by the radiologist.  I reviewed all testing with the patient.     Upon re-evaluation dela cruz drained, pain resolved.    Disposition: I have reviewed the patient's vital signs, nursing notes, and other relevant tests/information. I had a detailed discussion with the patient regarding the history, exam findings, and any diagnostic results.   Plan to discharge home in stable condition, follow up with PCP.  Discussed with patient who is agreeable to plan.  I discussed discharge instructions, need for follow-up, and oral return precautions for what to return for in addition to the written return precautions and discharge instructions, specifically highlighting areas of special concern.  The patient verbalized understanding of the discharge instructions and warnings that would necessitate return to the Emergency Department including worsening abdominal pain, fever.  All questions the patient had were answered prior to discharge to the best of my ability.       Portions of the record may have been created with voice recognition software. Occasional wrong word or \"sound a like\" substitutions may have occurred due to the inherent limitations of voice recognition software. Read the chart carefully and recognize, using context, where substitutions have occurred.        Disposition  Final diagnoses:   Suprapubic catheter dysfunction, initial encounter (HCC)     Time reflects when diagnosis was documented in both MDM as applicable and the Disposition within this note       Time User Action Codes Description Comment    4/6/2024  4:13 PM Nga Richard Add [T83.010A] Suprapubic catheter dysfunction, initial encounter (HCC)           ED Disposition       ED Disposition   Discharge    " Condition   Stable    Date/Time   Sat Apr 6, 2024  4:13 PM    Comment   Mathew Rico discharge to home/self care.                   Follow-up Information       Follow up With Specialties Details Why Contact Info    Steve Dickerson DO Geriatric Medicine, Family Medicine Schedule an appointment as soon as possible for a visit  As needed [address]  [place] [id number] [phone]              Patient's Medications   Discharge Prescriptions    No medications on file     No discharge procedures on file.    PDMP Review         Value Time User    PDMP Reviewed  Yes 8/19/2023  1:22 AM SHA [facility]s             ED Provider  Attending physically available and evaluated Mathew Rico. I managed the patient along with the ED Attending.    Electronically Signed by           Nga Richard DO  04/06/24 4605

## 2024-04-06 NOTE — DISCHARGE INSTRUCTIONS
You were seen in the emergency department today for suprapubic catheter not draining.    Follow up with your primary care provider.     Take your normal medications as prescribed.     Return to the emergency department for any new or concerning symptoms including worsening abdominal pain, fever.     Thank you for choosing  [facility] for your care today.

## 2024-04-06 NOTE — ED NOTES
Irrigated suprapubic catheter with 50mL sterile water - catheter draining into bag with 2,350mL output     Mariangel Haq RN  04/06/24 1522       Mariangel Haq RN  04/06/24 8118

## 2024-04-06 NOTE — ED ATTENDING ATTESTATION
Final Diagnoses:     1. Suprapubic catheter dysfunction, initial encounter (HCC)           I, Elbert Graham MD, saw and evaluated the patient. All available labs and X-rays were ordered by me or the resident / non-physician and have been reviewed by myself. I discussed the patient with the resident / non-physician and agree with the resident's / non-physician practitioner's findings and plan as documented in the resident's / non-physician practicitioner's note, except where noted.   At this point, I agree with the current assessment done in the ED.   I was present during key portions of all procedures performed unless otherwise stated.     HPI:  NURSING TRIAGE:    This is a 73 y.o. male presenting for evaluation of abdominal pain.  Patient has a suprapubpic (for years).  It isn't functioning today.  Significant abdominal distention.  Severe abdominal pain.  No f/ch/n/v.   Chief Complaint   Patient presents with    Urinary Catheter Problem     Per EMS patient coming from [facility] c/o abdominal pain - pt with suprapubic catheter with no drainage in bag. Per EMS staff at [facility] attempted to flush without any return.      PHYSICAL: ASSESSMENT + PLAN:   General: VS reviewed  Appears very uncomfortable.   awake, alert.   Well-nourished, well-developed. Appears stated age.   Speaking normally in full sentences.   Head: Normocephalic, atraumatic  Eyes: EOM-I. No diplopia.   No hyphema.   No subconjunctival hemorrhages.  Symmetrical lids.   ENT: Atraumatic external nose and ears.    MMM  No malocclusion. No stridor. Normal phonation. No drooling. Normal swallowing.   Neck: No JVD.  CV: No pallor noted  Lungs:   No tachypnea  No respiratory distress  Abd: firm abdomen, distended abdomen  Diffuse tenderness  Suprapubpic in place  No signs of cellulitis or leakage.  Flushed and now has output of yellow urine.  MSK:   FROM spontaneously  Skin: Dry, intact.   Neuro: Awake, alert, GCS15, CN II-XII grossly intact.    Motor grossly intact.  Psychiatric/Behavioral: interacting normally; appropriate mood/affect.    Exam: deferred    Vitals:    04/06/24 1518 04/06/24 1530   BP: 154/76 112/54   BP Location: Right arm    Pulse: 91 87   Resp: 16    Temp: 98.7 °F (37.1 °C)    TempSrc: Oral    SpO2: 96% 95%    Suprapubpic catheter.  Flush  Reevaluate  Replace?  We'll see based on functionality.      There are no obvious limitations to social determinants of care.   Nursing note reviewed.   Vitals reviewed.   Orders placed by myself and/or advanced practitioner / resident.    Previous chart was reviewed  No language barrier.   History obtained from patient.    There are no limitations to the history obtained:     Past Medical: Past Surgical:    has a past medical history of Acute laryngitis, Acute nonsuppurative otitis media, unspecified laterality, Arm weakness, Arthritis, 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HCC), Bladder infection, Bronchitis, Constipation, Cough, Diabetes mellitus ([facility]), Dizziness, Dysfunction of eustachian tube, Erectile dysfunction of non-organic origin, Fatigue, Glaucoma, Hiatal hernia, Hypertension, Imbalance, Leg muscle spasm, MS (multiple sclerosis) ([facility]), Nephrolithiasis, Neurogenic bladder, No natural teeth, Sinus pain, Spinal stenosis, Strain of thoracic region, Stroke ([facility]), and Suprapubic catheter ([facility]).  has a past surgical history that includes Suprapubic catheter insertion; Appendectomy; Myringotomy; Eye surgery; Brain surgery; pr litholapaxy smpl/sm <2.5 cm (N/A, 5/7/2019); Cystoscopy; Cystoscopy; Cystoscopy (06/11/2018); Cystoscopy (01/15/2021); and IR suprapubic catheter check/change/reinsertion/upsize (3/28/2024).   Social: Cardiac (Echo/Cath)   Social History     Substance and Sexual Activity   Alcohol Use Not Currently     Social History     Tobacco Use   Smoking Status Former    Current packs/day: 0.00    Average packs/day: 0.5 packs/day for 31.0 years (15.5 ttl pk-yrs)    Types: Cigarettes    Start date:  1964    Quit date:     Years since quittin.2   Smokeless Tobacco Never     Social History     Substance and Sexual Activity   Drug Use No    Results for orders placed during the hospital encounter of 10/21/18    Echo complete with contrast if indicated    Narrative  [facility]  [address] [place] [address]  [phone]    Transthoracic Echocardiogram  2D, M-mode, Doppler, and Color Doppler    Study date:  23-Oct-2018    Patient: FELIPE LARIOS  MR number: [id number]  Account number: [id number]  : 1950  Age: 68 years  Gender: Male  Status: Inpatient  Location: Bedside  Height: 65 in  Weight: 187 lb  BP: 125/ 58 mmHg    Indications: Transient ischemic attack.    Diagnoses: G45.9 - Transient cerebral ischemic attack, unspecified    Sonographer:  Joshua Yeung RDCS  Primary Physician:  Antoni Parrish DO  Referring Physician:  Zeeshan Young DO  Group:  [facility]  Interpreting Physician:  Ed Basurto DO    SUMMARY    PROCEDURE INFORMATION:  This was a technically difficult and thus a very limited study.    LEFT VENTRICLE:  Systolic function was vigorous. Ejection fraction was estimated to be 75 %.  There were no regional wall motion abnormalities.  Doppler parameters were consistent with abnormal left ventricular relaxation (grade 1 diastolic dysfunction).    MITRAL VALVE:  There was mild annular calcification.    HISTORY: PRIOR HISTORY: Diabetes, Hypertension, Hyperlipidemia, Multiple sclerosis, Obstructive sleep apnea.    PROCEDURE: The procedure was performed at the bedside. This was a routine study. The transthoracic approach was used. The study included complete 2D imaging, M-mode, complete spectral Doppler, and color Doppler. The heart rate was 90 bpm,  at the start of the study. Images were obtained from the parasternal, apical, subcostal, and suprasternal notch acoustic windows. Intravenous contrast ( 1.0 mL Definity  in NSS) was administered to opacify the left ventricle. This was a  technically difficult and thus a very limited study.    LEFT VENTRICLE: Size was normal. Systolic function was vigorous. Ejection fraction was estimated to be 75 %. There were no regional wall motion abnormalities. Wall thickness was normal. DOPPLER: Doppler parameters were consistent with  abnormal left ventricular relaxation (grade 1 diastolic dysfunction).    RIGHT VENTRICLE: The size was normal. Systolic function was low normal.    LEFT ATRIUM: Size was normal.    RIGHT ATRIUM: Size was normal.    MITRAL VALVE: There was mild annular calcification. Not well visualized.    AORTIC VALVE: The valve was not well visualized.    TRICUSPID VALVE: Not well visualized.    PULMONIC VALVE: Not well visualized.    PERICARDIUM: There was no pericardial effusion.    AORTA: The root exhibited normal size, but was poorly visualized.    SYSTEM MEASUREMENT TABLES    2D  %FS: 29.4 %  Ao Diam: 3.1 cm  EDV(Teich): 33.2 ml  EF(Teich): 58.1 %  ESV(Teich): 13.9 ml  IVSd: 1 cm  LA Diam: 3.1 cm  LVEDV MOD A4C: 47.6 ml  LVEF MOD A4C: 63.2 %  LVESV MOD A4C: 17.5 ml  LVIDd: 2.9 cm  LVIDs: 2.1 cm  LVLd A4C: 7.5 cm  LVLs A4C: 5.7 cm  LVPWd: 0.8 cm  SV MOD A4C: 30.1 ml  SV(Teich): 19.3 ml    PW  E': 0 m/s  E/E': 17.1  MV A Zeke: 1.2 m/s  MV Dec Bath: 4.2 m/s2  MV DecT: 168.3 ms  MV E Zeke: 0.7 m/s  MV E/A Ratio: 0.6  MV PHT: 48.8 ms  MVA By PHT: 4.5 cm2    [facility] Accredited Echocardiography Laboratory    Prepared and electronically signed by    Ed Basurto DO  Signed 23-Oct-2018 13:27:09    No results found for this or any previous visit.    No results found for this or any previous visit.     Labs: Imaging:   Labs Reviewed - No data to display No orders to display      Medications: Code Status:   Medications - No data to display Code Status: Prior  Advance Directive and Living Will:      Power of :    POLST:     No orders of the defined types were  placed in this encounter.    Time reflects when diagnosis was documented in both MDM as applicable and the Disposition within this note       Time User Action Codes Description Comment    4/6/2024  4:13 PM Nga Richard Add [T83.010A] Suprapubic catheter dysfunction, initial encounter (HCC)           ED Disposition       ED Disposition   Discharge    Condition   Stable    Date/Time   Sat Apr 6, 2024  4:13 PM    Comment   Mathew Rico discharge to home/self care.                   Follow-up Information       Follow up With Specialties Details Why Contact Info    Steve Dickerson,  Geriatric Medicine, Family Medicine Schedule an appointment as soon as possible for a visit  As needed [address]  [place] [id number] [phone]            Patient's Medications   Discharge Prescriptions    No medications on file     No discharge procedures on file.  Prior to Admission Medications   Prescriptions Last Dose Informant Patient Reported? Taking?   ALPRAZolam (XANAX) 0.25 mg tablet  Self Yes No   Sig: Take 0.25 mg by mouth 2 (two) times a day as needed for anxiety or sleep    Dulaglutide (Trulicity) 0.75 MG/0.5ML SOPN  Self Yes No   Sig: Inject 0.75 mg under the skin once a week   Ergocalciferol (VITAMIN D2 PO)  Self Yes No   Sig: Take 50,000 Units by mouth once a week   acetaminophen (TYLENOL) 325 mg tablet   No No   Sig: Take 2 tablets (650 mg total) by mouth every 6 (six) hours as needed for mild pain   albuterol (2.5 mg/3 mL) 0.083 % nebulizer solution   No No   Sig: Take 3 mL (2.5 mg total) by nebulization every 6 (six) hours as needed for wheezing or shortness of breath   amLODIPine-atorvastatin (CADUET) 10-80 MG per tablet  Self Yes No   Sig: Take 1 tablet by mouth daily   bisacodyl (DULCOLAX) 10 mg suppository   No No   Sig: Insert 1 suppository (10 mg total) into the rectum daily as needed for constipation   clopidogrel (PLAVIX) 75 mg tablet  Self No No   Sig: Take 1 tablet (75 mg total) by mouth  "daily   gabapentin (NEURONTIN) 300 mg capsule  Self No No   Sig: Take 1 capsule (300 mg total) by mouth 2 (two) times a day   gabapentin (NEURONTIN) 300 mg capsule   No No   Sig: Take 2 capsules (600 mg total) by mouth daily at bedtime   latanoprost (XALATAN) 0.005 % ophthalmic solution  Self Yes No   Sig: Administer 1 drop to both eyes daily at bedtime   melatonin 3 mg  Self Yes No   Sig: Take 3 mg by mouth daily at bedtime as needed   methenamine hippurate (HIPREX) 1 g tablet   No No   Sig: Take 1 tablet (1 g total) by mouth 2 (two) times a day with meals   pantoprazole (PROTONIX) 40 mg tablet   No No   Sig: TAKE 1 TABLET TWICE DAILY 30 MINUTES BEFORE BREAKFAST AND DINNER.   polyethylene glycol (MIRALAX) 17 g packet   No No   Sig: Take 17 g by mouth daily   potassium citrate (UROCIT-K) 10 mEq   No No   Sig: Take 2 tablets (20 mEq total) by mouth in the morning   propranolol (INDERAL LA) 60 mg 24 hr capsule   Yes No   Sig: Take 60 mg by mouth daily   senna-docusate sodium (SENOKOT S) 8.6-50 mg per tablet   Yes No   Sig: Take 2 tablets by mouth daily at bedtime   sertraline (ZOLOFT) 25 mg tablet   Yes No   Sig: Take 25 mg by mouth daily at bedtime      Facility-Administered Medications: None                        Portions of the record may have been created with voice recognition software. Occasional wrong word or \"sound a like\" substitutions may have occurred due to the inherent limitations of voice recognition software. Read the chart carefully and recognize, using context, where substitutions have occurred.    Electronically signed by:  Elbert Graham  "

## 2024-04-11 ENCOUNTER — HOSPITAL ENCOUNTER (INPATIENT)
Facility: HOSPITAL | Age: 74
LOS: 3 days | Discharge: NURSING, REHAB OR LONG-TERM CARE | DRG: 699 | End: 2024-04-15
Attending: EMERGENCY MEDICINE | Admitting: INTERNAL MEDICINE
Payer: MEDICARE

## 2024-04-11 DIAGNOSIS — K20.90 ESOPHAGITIS: ICD-10-CM

## 2024-04-11 DIAGNOSIS — N39.0 UTI (URINARY TRACT INFECTION): ICD-10-CM

## 2024-04-11 DIAGNOSIS — J18.9 HCAP (HEALTHCARE-ASSOCIATED PNEUMONIA): ICD-10-CM

## 2024-04-11 DIAGNOSIS — T83.010A SUPRAPUBIC CATHETER DYSFUNCTION, INITIAL ENCOUNTER (HCC): Primary | ICD-10-CM

## 2024-04-11 DIAGNOSIS — R07.89 ATYPICAL CHEST PAIN: ICD-10-CM

## 2024-04-11 PROCEDURE — 99285 EMERGENCY DEPT VISIT HI MDM: CPT

## 2024-04-12 ENCOUNTER — APPOINTMENT (EMERGENCY)
Dept: RADIOLOGY | Facility: HOSPITAL | Age: 74
DRG: 699 | End: 2024-04-12
Payer: MEDICARE

## 2024-04-12 PROBLEM — R07.9 CHEST PAIN: Status: ACTIVE | Noted: 2024-04-12

## 2024-04-12 LAB
2HR DELTA HS TROPONIN: 0 NG/L
ANION GAP SERPL CALCULATED.3IONS-SCNC: 12 MMOL/L (ref 4–13)
ANION GAP SERPL CALCULATED.3IONS-SCNC: 9 MMOL/L (ref 4–13)
ATRIAL RATE: 85 BPM
BACTERIA UR QL AUTO: ABNORMAL /HPF
BASOPHILS # BLD AUTO: 0.16 THOUSANDS/ÂΜL (ref 0–0.1)
BASOPHILS NFR BLD AUTO: 1 % (ref 0–1)
BILIRUB UR QL STRIP: NEGATIVE
BUDDING YEAST: PRESENT
BUN SERPL-MCNC: 20 MG/DL (ref 5–25)
BUN SERPL-MCNC: 24 MG/DL (ref 5–25)
CALCIUM SERPL-MCNC: 9.5 MG/DL (ref 8.4–10.2)
CALCIUM SERPL-MCNC: 9.5 MG/DL (ref 8.4–10.2)
CARDIAC TROPONIN I PNL SERPL HS: 5 NG/L
CARDIAC TROPONIN I PNL SERPL HS: 5 NG/L
CHLORIDE SERPL-SCNC: 103 MMOL/L (ref 96–108)
CHLORIDE SERPL-SCNC: 105 MMOL/L (ref 96–108)
CLARITY UR: ABNORMAL
CO2 SERPL-SCNC: 20 MMOL/L (ref 21–32)
CO2 SERPL-SCNC: 22 MMOL/L (ref 21–32)
COLOR UR: YELLOW
CREAT SERPL-MCNC: 0.75 MG/DL (ref 0.6–1.3)
CREAT SERPL-MCNC: 0.83 MG/DL (ref 0.6–1.3)
EOSINOPHIL # BLD AUTO: 0.94 THOUSAND/ÂΜL (ref 0–0.61)
EOSINOPHIL NFR BLD AUTO: 5 % (ref 0–6)
ERYTHROCYTE [DISTWIDTH] IN BLOOD BY AUTOMATED COUNT: 14.6 % (ref 11.6–15.1)
ERYTHROCYTE [DISTWIDTH] IN BLOOD BY AUTOMATED COUNT: 14.6 % (ref 11.6–15.1)
GFR SERPL CREATININE-BSD FRML MDRD: 86 ML/MIN/1.73SQ M
GFR SERPL CREATININE-BSD FRML MDRD: 90 ML/MIN/1.73SQ M
GLUCOSE SERPL-MCNC: 111 MG/DL (ref 65–140)
GLUCOSE SERPL-MCNC: 137 MG/DL (ref 65–140)
GLUCOSE SERPL-MCNC: 139 MG/DL (ref 65–140)
GLUCOSE SERPL-MCNC: 142 MG/DL (ref 65–140)
GLUCOSE SERPL-MCNC: 172 MG/DL (ref 65–140)
GLUCOSE SERPL-MCNC: 71 MG/DL (ref 65–140)
GLUCOSE UR STRIP-MCNC: ABNORMAL MG/DL
HCT VFR BLD AUTO: 44.7 % (ref 36.5–49.3)
HCT VFR BLD AUTO: 45.5 % (ref 36.5–49.3)
HGB BLD-MCNC: 14.7 G/DL (ref 12–17)
HGB BLD-MCNC: 14.7 G/DL (ref 12–17)
HGB UR QL STRIP.AUTO: ABNORMAL
IMM GRANULOCYTES # BLD AUTO: 0.13 THOUSAND/UL (ref 0–0.2)
IMM GRANULOCYTES NFR BLD AUTO: 1 % (ref 0–2)
KETONES UR STRIP-MCNC: NEGATIVE MG/DL
L PNEUMO1 AG UR QL IA.RAPID: NEGATIVE
LEUKOCYTE ESTERASE UR QL STRIP: ABNORMAL
LIPASE SERPL-CCNC: 38 U/L (ref 11–82)
LYMPHOCYTES # BLD AUTO: 3.28 THOUSANDS/ÂΜL (ref 0.6–4.47)
LYMPHOCYTES NFR BLD AUTO: 16 % (ref 14–44)
MCH RBC QN AUTO: 29.8 PG (ref 26.8–34.3)
MCH RBC QN AUTO: 29.9 PG (ref 26.8–34.3)
MCHC RBC AUTO-ENTMCNC: 32.3 G/DL (ref 31.4–37.4)
MCHC RBC AUTO-ENTMCNC: 32.9 G/DL (ref 31.4–37.4)
MCV RBC AUTO: 91 FL (ref 82–98)
MCV RBC AUTO: 92 FL (ref 82–98)
MONOCYTES # BLD AUTO: 1.27 THOUSAND/ÂΜL (ref 0.17–1.22)
MONOCYTES NFR BLD AUTO: 6 % (ref 4–12)
NEUTROPHILS # BLD AUTO: 14.58 THOUSANDS/ÂΜL (ref 1.85–7.62)
NEUTS SEG NFR BLD AUTO: 71 % (ref 43–75)
NITRITE UR QL STRIP: NEGATIVE
NON-SQ EPI CELLS URNS QL MICRO: ABNORMAL /HPF
NRBC BLD AUTO-RTO: 0 /100 WBCS
P AXIS: 68 DEGREES
PH UR STRIP.AUTO: 6 [PH]
PLATELET # BLD AUTO: 329 THOUSANDS/UL (ref 149–390)
PLATELET # BLD AUTO: 364 THOUSANDS/UL (ref 149–390)
PMV BLD AUTO: 8.5 FL (ref 8.9–12.7)
PMV BLD AUTO: 8.5 FL (ref 8.9–12.7)
POTASSIUM SERPL-SCNC: 4.1 MMOL/L (ref 3.5–5.3)
POTASSIUM SERPL-SCNC: 4.3 MMOL/L (ref 3.5–5.3)
PR INTERVAL: 154 MS
PROCALCITONIN SERPL-MCNC: 0.07 NG/ML
PROT UR STRIP-MCNC: ABNORMAL MG/DL
QRS AXIS: 71 DEGREES
QRSD INTERVAL: 90 MS
QT INTERVAL: 356 MS
QTC INTERVAL: 423 MS
RBC # BLD AUTO: 4.92 MILLION/UL (ref 3.88–5.62)
RBC # BLD AUTO: 4.94 MILLION/UL (ref 3.88–5.62)
RBC #/AREA URNS AUTO: ABNORMAL /HPF
S PNEUM AG UR QL: NEGATIVE
SODIUM SERPL-SCNC: 134 MMOL/L (ref 135–147)
SODIUM SERPL-SCNC: 137 MMOL/L (ref 135–147)
SP GR UR STRIP.AUTO: 1.05 (ref 1–1.03)
T WAVE AXIS: 60 DEGREES
TRANS CELLS #/AREA URNS HPF: PRESENT /[HPF]
UROBILINOGEN UR STRIP-ACNC: <2 MG/DL
VENTRICULAR RATE: 85 BPM
WBC # BLD AUTO: 15.03 THOUSAND/UL (ref 4.31–10.16)
WBC # BLD AUTO: 20.36 THOUSAND/UL (ref 4.31–10.16)
WBC #/AREA URNS AUTO: ABNORMAL /HPF
WBC CLUMPS # UR AUTO: PRESENT /UL

## 2024-04-12 PROCEDURE — 97167 OT EVAL HIGH COMPLEX 60 MIN: CPT

## 2024-04-12 PROCEDURE — 87449 NOS EACH ORGANISM AG IA: CPT | Performed by: INTERNAL MEDICINE

## 2024-04-12 PROCEDURE — 94664 DEMO&/EVAL PT USE INHALER: CPT

## 2024-04-12 PROCEDURE — 84484 ASSAY OF TROPONIN QUANT: CPT

## 2024-04-12 PROCEDURE — 87186 SC STD MICRODIL/AGAR DIL: CPT | Performed by: EMERGENCY MEDICINE

## 2024-04-12 PROCEDURE — 87077 CULTURE AEROBIC IDENTIFY: CPT | Performed by: EMERGENCY MEDICINE

## 2024-04-12 PROCEDURE — 87040 BLOOD CULTURE FOR BACTERIA: CPT | Performed by: INTERNAL MEDICINE

## 2024-04-12 PROCEDURE — 97163 PT EVAL HIGH COMPLEX 45 MIN: CPT

## 2024-04-12 PROCEDURE — 81001 URINALYSIS AUTO W/SCOPE: CPT | Performed by: EMERGENCY MEDICINE

## 2024-04-12 PROCEDURE — 83690 ASSAY OF LIPASE: CPT

## 2024-04-12 PROCEDURE — 84145 PROCALCITONIN (PCT): CPT | Performed by: EMERGENCY MEDICINE

## 2024-04-12 PROCEDURE — 80048 BASIC METABOLIC PNL TOTAL CA: CPT | Performed by: INTERNAL MEDICINE

## 2024-04-12 PROCEDURE — 87086 URINE CULTURE/COLONY COUNT: CPT | Performed by: EMERGENCY MEDICINE

## 2024-04-12 PROCEDURE — 85025 COMPLETE CBC W/AUTO DIFF WBC: CPT

## 2024-04-12 PROCEDURE — 93010 ELECTROCARDIOGRAM REPORT: CPT | Performed by: INTERNAL MEDICINE

## 2024-04-12 PROCEDURE — 82948 REAGENT STRIP/BLOOD GLUCOSE: CPT

## 2024-04-12 PROCEDURE — 87106 FUNGI IDENTIFICATION YEAST: CPT | Performed by: EMERGENCY MEDICINE

## 2024-04-12 PROCEDURE — 85027 COMPLETE CBC AUTOMATED: CPT | Performed by: INTERNAL MEDICINE

## 2024-04-12 PROCEDURE — 74177 CT ABD & PELVIS W/CONTRAST: CPT

## 2024-04-12 PROCEDURE — 87081 CULTURE SCREEN ONLY: CPT | Performed by: INTERNAL MEDICINE

## 2024-04-12 PROCEDURE — 71260 CT THORAX DX C+: CPT

## 2024-04-12 PROCEDURE — 96374 THER/PROPH/DIAG INJ IV PUSH: CPT

## 2024-04-12 PROCEDURE — 99222 1ST HOSP IP/OBS MODERATE 55: CPT | Performed by: INTERNAL MEDICINE

## 2024-04-12 PROCEDURE — 93005 ELECTROCARDIOGRAM TRACING: CPT

## 2024-04-12 PROCEDURE — 80048 BASIC METABOLIC PNL TOTAL CA: CPT

## 2024-04-12 PROCEDURE — 36415 COLL VENOUS BLD VENIPUNCTURE: CPT

## 2024-04-12 PROCEDURE — 99285 EMERGENCY DEPT VISIT HI MDM: CPT | Performed by: EMERGENCY MEDICINE

## 2024-04-12 RX ORDER — HEPARIN SODIUM 5000 [USP'U]/ML
5000 INJECTION, SOLUTION INTRAVENOUS; SUBCUTANEOUS EVERY 8 HOURS SCHEDULED
Status: DISCONTINUED | OUTPATIENT
Start: 2024-04-12 | End: 2024-04-15 | Stop reason: HOSPADM

## 2024-04-12 RX ORDER — DEXTROSE MONOHYDRATE 25 G/50ML
INJECTION, SOLUTION INTRAVENOUS
Status: DISPENSED
Start: 2024-04-12 | End: 2024-04-12

## 2024-04-12 RX ORDER — INSULIN LISPRO 100 [IU]/ML
2-12 INJECTION, SOLUTION INTRAVENOUS; SUBCUTANEOUS
Status: DISCONTINUED | OUTPATIENT
Start: 2024-04-12 | End: 2024-04-15 | Stop reason: HOSPADM

## 2024-04-12 RX ORDER — LATANOPROST 50 UG/ML
1 SOLUTION/ DROPS OPHTHALMIC
Status: DISCONTINUED | OUTPATIENT
Start: 2024-04-12 | End: 2024-04-15 | Stop reason: HOSPADM

## 2024-04-12 RX ORDER — BACLOFEN 10 MG/1
5 TABLET ORAL 3 TIMES DAILY
COMMUNITY

## 2024-04-12 RX ORDER — AMOXICILLIN 250 MG
2 CAPSULE ORAL
Status: DISCONTINUED | OUTPATIENT
Start: 2024-04-12 | End: 2024-04-15 | Stop reason: HOSPADM

## 2024-04-12 RX ORDER — VANCOMYCIN HYDROCHLORIDE 750 MG/150ML
750 INJECTION, SOLUTION INTRAVENOUS EVERY 12 HOURS
Status: DISCONTINUED | OUTPATIENT
Start: 2024-04-12 | End: 2024-04-12

## 2024-04-12 RX ORDER — AMLODIPINE BESYLATE 10 MG/1
10 TABLET ORAL DAILY
Status: DISCONTINUED | OUTPATIENT
Start: 2024-04-12 | End: 2024-04-15 | Stop reason: HOSPADM

## 2024-04-12 RX ORDER — POLYETHYLENE GLYCOL 3350 17 G/17G
17 POWDER, FOR SOLUTION ORAL DAILY
Status: DISCONTINUED | OUTPATIENT
Start: 2024-04-12 | End: 2024-04-15 | Stop reason: HOSPADM

## 2024-04-12 RX ORDER — GABAPENTIN 300 MG/1
300 CAPSULE ORAL 2 TIMES DAILY
Status: DISCONTINUED | OUTPATIENT
Start: 2024-04-12 | End: 2024-04-15 | Stop reason: HOSPADM

## 2024-04-12 RX ORDER — MIRTAZAPINE 7.5 MG/1
7.5 TABLET, FILM COATED ORAL
COMMUNITY

## 2024-04-12 RX ORDER — GUAIFENESIN/DEXTROMETHORPHAN 100-10MG/5
10 SYRUP ORAL EVERY 4 HOURS PRN
Status: DISCONTINUED | OUTPATIENT
Start: 2024-04-12 | End: 2024-04-15 | Stop reason: HOSPADM

## 2024-04-12 RX ORDER — ALBUTEROL SULFATE 2.5 MG/3ML
2.5 SOLUTION RESPIRATORY (INHALATION) EVERY 6 HOURS PRN
Status: DISCONTINUED | OUTPATIENT
Start: 2024-04-12 | End: 2024-04-14

## 2024-04-12 RX ORDER — GABAPENTIN 300 MG/1
600 CAPSULE ORAL
Status: DISCONTINUED | OUTPATIENT
Start: 2024-04-12 | End: 2024-04-15 | Stop reason: HOSPADM

## 2024-04-12 RX ORDER — BUDESONIDE AND FORMOTEROL FUMARATE DIHYDRATE 160; 4.5 UG/1; UG/1
2 AEROSOL RESPIRATORY (INHALATION) 2 TIMES DAILY
COMMUNITY

## 2024-04-12 RX ORDER — ATORVASTATIN CALCIUM 80 MG/1
80 TABLET, FILM COATED ORAL DAILY
Status: DISCONTINUED | OUTPATIENT
Start: 2024-04-12 | End: 2024-04-15 | Stop reason: HOSPADM

## 2024-04-12 RX ORDER — MICONAZOLE NITRATE 20 MG/G
CREAM TOPICAL 2 TIMES DAILY
Status: DISCONTINUED | OUTPATIENT
Start: 2024-04-12 | End: 2024-04-15 | Stop reason: HOSPADM

## 2024-04-12 RX ORDER — MIRTAZAPINE 15 MG/1
7.5 TABLET, FILM COATED ORAL
Status: DISCONTINUED | OUTPATIENT
Start: 2024-04-12 | End: 2024-04-15 | Stop reason: HOSPADM

## 2024-04-12 RX ORDER — PANTOPRAZOLE SODIUM 40 MG/1
40 TABLET, DELAYED RELEASE ORAL DAILY
Status: DISCONTINUED | OUTPATIENT
Start: 2024-04-12 | End: 2024-04-15 | Stop reason: HOSPADM

## 2024-04-12 RX ORDER — HYDROMORPHONE HCL/PF 1 MG/ML
0.5 SYRINGE (ML) INJECTION ONCE
Status: COMPLETED | OUTPATIENT
Start: 2024-04-12 | End: 2024-04-12

## 2024-04-12 RX ORDER — BUDESONIDE AND FORMOTEROL FUMARATE DIHYDRATE 160; 4.5 UG/1; UG/1
2 AEROSOL RESPIRATORY (INHALATION) 2 TIMES DAILY
Status: DISCONTINUED | OUTPATIENT
Start: 2024-04-12 | End: 2024-04-15 | Stop reason: HOSPADM

## 2024-04-12 RX ORDER — LANOLIN ALCOHOL/MO/W.PET/CERES
3 CREAM (GRAM) TOPICAL
Status: DISCONTINUED | OUTPATIENT
Start: 2024-04-12 | End: 2024-04-15 | Stop reason: HOSPADM

## 2024-04-12 RX ORDER — LEVOFLOXACIN 5 MG/ML
750 INJECTION, SOLUTION INTRAVENOUS ONCE
Qty: 150 ML | Refills: 0 | Status: COMPLETED | OUTPATIENT
Start: 2024-04-12 | End: 2024-04-12

## 2024-04-12 RX ORDER — BACLOFEN 10 MG/1
5 TABLET ORAL 3 TIMES DAILY
Status: DISCONTINUED | OUTPATIENT
Start: 2024-04-12 | End: 2024-04-15 | Stop reason: HOSPADM

## 2024-04-12 RX ORDER — INSULIN LISPRO 100 [IU]/ML
1-6 INJECTION, SOLUTION INTRAVENOUS; SUBCUTANEOUS
Status: DISCONTINUED | OUTPATIENT
Start: 2024-04-12 | End: 2024-04-15 | Stop reason: HOSPADM

## 2024-04-12 RX ORDER — CLOPIDOGREL BISULFATE 75 MG/1
75 TABLET ORAL DAILY
Status: DISCONTINUED | OUTPATIENT
Start: 2024-04-12 | End: 2024-04-15 | Stop reason: HOSPADM

## 2024-04-12 RX ORDER — ACETAMINOPHEN 325 MG/1
650 TABLET ORAL EVERY 6 HOURS PRN
Status: DISCONTINUED | OUTPATIENT
Start: 2024-04-12 | End: 2024-04-15 | Stop reason: HOSPADM

## 2024-04-12 RX ORDER — METHENAMINE HIPPURATE 1000 MG/1
1 TABLET ORAL 2 TIMES DAILY WITH MEALS
Status: DISCONTINUED | OUTPATIENT
Start: 2024-04-12 | End: 2024-04-15 | Stop reason: HOSPADM

## 2024-04-12 RX ORDER — PROPRANOLOL HCL 60 MG
60 CAPSULE, EXTENDED RELEASE 24HR ORAL DAILY
Status: DISCONTINUED | OUTPATIENT
Start: 2024-04-12 | End: 2024-04-15 | Stop reason: HOSPADM

## 2024-04-12 RX ADMIN — IOHEXOL 100 ML: 350 INJECTION, SOLUTION INTRAVENOUS at 02:16

## 2024-04-12 RX ADMIN — GABAPENTIN 300 MG: 300 CAPSULE ORAL at 09:48

## 2024-04-12 RX ADMIN — HYDROMORPHONE HYDROCHLORIDE 0.5 MG: 1 INJECTION, SOLUTION INTRAMUSCULAR; INTRAVENOUS; SUBCUTANEOUS at 01:38

## 2024-04-12 RX ADMIN — BACLOFEN 5 MG: 10 TABLET ORAL at 09:48

## 2024-04-12 RX ADMIN — GABAPENTIN 600 MG: 300 CAPSULE ORAL at 23:10

## 2024-04-12 RX ADMIN — PROPRANOLOL HYDROCHLORIDE 60 MG: 60 CAPSULE, EXTENDED RELEASE ORAL at 09:35

## 2024-04-12 RX ADMIN — VANCOMYCIN HYDROCHLORIDE 1750 MG: 1 INJECTION, POWDER, LYOPHILIZED, FOR SOLUTION INTRAVENOUS at 03:57

## 2024-04-12 RX ADMIN — LEVOFLOXACIN 750 MG: 750 INJECTION, SOLUTION INTRAVENOUS at 09:22

## 2024-04-12 RX ADMIN — BACLOFEN 5 MG: 10 TABLET ORAL at 21:11

## 2024-04-12 RX ADMIN — BUDESONIDE AND FORMOTEROL FUMARATE DIHYDRATE 2 PUFF: 160; 4.5 AEROSOL RESPIRATORY (INHALATION) at 18:31

## 2024-04-12 RX ADMIN — GABAPENTIN 300 MG: 300 CAPSULE ORAL at 14:47

## 2024-04-12 RX ADMIN — CYANOCOBALAMIN TAB 500 MCG 500 MCG: 500 TAB at 09:48

## 2024-04-12 RX ADMIN — HEPARIN SODIUM 5000 UNITS: 5000 INJECTION INTRAVENOUS; SUBCUTANEOUS at 06:41

## 2024-04-12 RX ADMIN — CLOPIDOGREL BISULFATE 75 MG: 75 TABLET ORAL at 09:48

## 2024-04-12 RX ADMIN — PANTOPRAZOLE SODIUM 40 MG: 40 TABLET, DELAYED RELEASE ORAL at 09:48

## 2024-04-12 RX ADMIN — METHENAMINE HIPPURATE 1 G: 1 TABLET ORAL at 18:32

## 2024-04-12 RX ADMIN — AMLODIPINE BESYLATE 10 MG: 10 TABLET ORAL at 09:48

## 2024-04-12 RX ADMIN — ATORVASTATIN CALCIUM 80 MG: 80 TABLET, FILM COATED ORAL at 09:48

## 2024-04-12 RX ADMIN — MICONAZOLE NITRATE: 20 CREAM TOPICAL at 18:31

## 2024-04-12 RX ADMIN — MIRTAZAPINE 7.5 MG: 15 TABLET, FILM COATED ORAL at 23:10

## 2024-04-12 RX ADMIN — HEPARIN SODIUM 5000 UNITS: 5000 INJECTION INTRAVENOUS; SUBCUTANEOUS at 14:47

## 2024-04-12 RX ADMIN — INSULIN LISPRO 2 UNITS: 100 INJECTION, SOLUTION INTRAVENOUS; SUBCUTANEOUS at 14:46

## 2024-04-12 RX ADMIN — HEPARIN SODIUM 5000 UNITS: 5000 INJECTION INTRAVENOUS; SUBCUTANEOUS at 23:13

## 2024-04-12 RX ADMIN — BACLOFEN 5 MG: 10 TABLET ORAL at 18:31

## 2024-04-12 RX ADMIN — LATANOPROST 1 DROP: 50 SOLUTION OPHTHALMIC at 23:12

## 2024-04-12 RX ADMIN — BUDESONIDE AND FORMOTEROL FUMARATE DIHYDRATE 2 PUFF: 160; 4.5 AEROSOL RESPIRATORY (INHALATION) at 09:34

## 2024-04-12 RX ADMIN — ACETAMINOPHEN 650 MG: 325 TABLET, FILM COATED ORAL at 09:44

## 2024-04-12 RX ADMIN — METHENAMINE HIPPURATE 1 G: 1 TABLET ORAL at 09:35

## 2024-04-12 NOTE — PROGRESS NOTES
Mathew Rico is a 73 y.o. male who is currently ordered Vancomycin IV with management by the Pharmacy Consult service.  Relevant clinical data and objective / subjective history reviewed.  Vancomycin Assessment:  Indication and Goal AUC/Trough: Pneumonia (goal -600, trough >10)  Clinical Status: stable  Micro: cultures pending  Renal Function:  SCr: 0.83 mg/dL  CrCl: 72.3 mL/min  Renal replacement: Not on dialysis  Days of Therapy: 1  Current Dose: 1750mg IV loading dose  Vancomycin Plan:  New Dosinmg IV q12h  Estimated AUC: 443 mcg*hr/mL  Estimated Trough: 12.7 mcg/mL  Next Level: Random  at 0600  Renal Function Monitoring: Daily BMP and UOP  Pharmacy will continue to follow closely for s/sx of nephrotoxicity, infusion reactions and appropriateness of therapy.  BMP and CBC will be ordered per protocol. We will continue to follow the patient’s culture results and clinical progress daily.    Rose Torres, Pharmacist

## 2024-04-12 NOTE — CASE MANAGEMENT
Case Management Assessment & Discharge Planning Note    Patient name Mathew Rico  Location [facility] 919/[facility] 919-01 MRN [id number]  : 1950 Date 2024       Current Admission Date: 2024  Current Admission Diagnosis:Chest pain   Patient Active Problem List    Diagnosis Date Noted    Chest pain 2024    Acute encephalopathy 2024    Leukocytosis 2024    GERSON on CPAP 2024    Fall 2024    Primary hypertension 2024    Type 2 diabetes mellitus without complication, without long-term current use of insulin ([facility]) 2024    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[facility]) 2024    Multiple sclerosis ([facility]) 2024    Urinary retention 2024    Neck pain 2024    Vitamin B deficiency 2024    Generalized weakness 2024    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    Fall 2023    Weakness 2023    Accidental fall from chair 2023    Constipation 2023    Chronic diastolic congestive heart failure ([facility]) 2023    Hyponatremia 2023    Excessive daytime sleepiness 2022    Shortness of breath 2022    Sepsis ([facility]) 2021    Pancreatic mass 2020    Pancreatic lesion 2020    Bladder stones 2019    Bladder neck contracture 2019    History of CVA (cerebrovascular accident) 10/21/2018    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[facility]) 2017    Diabetic neuropathy ([facility]) 2016    Chronic suprapubic catheter ([facility]) 2016    Thyroid nodule 2015    Cervical spinal stenosis 2014    Generalized anxiety disorder 2014    Obstructive sleep apnea 2013    Benign colon polyp 2012    Esophageal reflux 2012    Fatty liver 2012    Glaucoma 2012    Hyperlipidemia 2012    Multiple sclerosis ([facility]) 2012    Type 2 diabetes mellitus with diabetic polyneuropathy, without long-term current use of insulin ([facility]) 2012    Primary hypertension 2012      LOS (days):  0  Geometric Mean LOS (GMLOS) (days): 2.9  Days to GMLOS:2.5     OBJECTIVE:  PATIENT READMITTED TO HOSPITAL  Risk of Unplanned Readmission Score: 49.1         Current admission status: Inpatient       Preferred Pharmacy:   [facility] #[id number] - [place] - [address]  [address] [address] [facility] [address]  Phone: [phone] Fax: [phone]    [place], [place] - [address]  [address] [facility] [place] [address]  Phone: [phone] Fax: [phone]    [facility] [place] - [address],  [address],  [address] [facility] [address]  Phone: [phone] Fax: [phone]     [facility] - [place], [place] - [address] [place] [address]  [address] [address]  Phone: [phone] Fax: [phone]    Primary Care Provider: Carolina Kumari MD    Primary Insurance: [facility]  Secondary Insurance:     ASSESSMENT:  Active Health Care Proxies       [facility] Care Representative - Brother   Primary Phone: [phone] (Home)                    Readmission Root Cause  30 Day Readmission: Yes  Who directed you to return to the hospital?: Other (comment) ([facility])  Did you understand whom to contact if you had questions or problems?: Yes  Did you get your prescriptions before you left the hospital?: Yes  Were you able to get your prescriptions filled when you left the hospital?: Yes  Did you take your medications as prescribed?: Yes  During previous admission, was a post-acute recommendation made?: Yes  What post-acute resources were offered?: STR  Patient was readmitted due to: PNA/UTI. IVAB.  Action Plan: Pt from [facility]    Patient Information  Admitted from:: Facility  Mental Status: Alert  During Assessment patient was accompanied by: Not accompanied during assessment  Assessment information provided by:: Patient  Primary Caregiver:  (Pt at [facility])  Support Systems: Family members  Home  entry access options. Select all that apply.: Stairs  Number of steps to enter home.: 1  Type of Current Residence: [facility]  Living Arrangements: Lives w/ Extended Family    Activities of Daily Living Prior to Admission  Functional Status: Assistance  Completes ADLs independently?: No  Level of ADL dependence: Assistance  Ambulates independently?: No  Level of ambulatory dependence: Assistance  Does patient use assisted devices?: Yes  Assisted Devices (DME) used: Walker, Wheelchair  Does patient have a history of Outpatient Therapy (PT/OT)?: No  Does the patient have a history of Short-Term Rehab?: Yes  Does patient have a history of HHC?: No  Does patient currently have HHC?: No         Patient Information Continued  Income Source: Pension/residential           DISCHARGE DETAILS:    Discharge planning discussed with:: Patient  Freedom of Choice: Yes  Comments - Freedom of Choice: Discussed FOC         Other Referral/Resources/Interventions Provided:  Referral Comments: Pt at [facility] for STR.  Admitted w/PNA/UTI - chronic dela cruz.  Spoke to Mariah in Admissions at [facility] for pt to return when medically stable for rehab.  Attempted to contact pt's brother Guzman to discuss dcp - receive a busy signal when trying to call.  Met w/pt at bedside, pt A&O x2. Pt states he does not want to return to [facility], he wants to go home.  Stated he lives w/his brother & sister in [place].  Has w/c at home.  Pt wanted to call his brother - this CM assisted pt w/phone call - busy signal obtained.  Will attempt to contact pt's brother to discuss dcp & anticipate return to [facility] for continued rehab.     Pt is 30 day readmit - see full open completed 3/27/2024.

## 2024-04-12 NOTE — PLAN OF CARE
Problem: PHYSICAL THERAPY ADULT  Goal: Performs mobility at highest level of function for planned discharge setting.  See evaluation for individualized goals.  Description: Treatment/Interventions: Functional transfer training, LE strengthening/ROM, Therapeutic exercise, Endurance training, Patient/family training, Equipment eval/education, Bed mobility, Spoke to nursing, Spoke to case management, OT  Equipment Recommended: Wheelchair       See flowsheet documentation for full assessment, interventions and recommendations.  Note: Prognosis: Fair  Problem List: Decreased strength, Decreased endurance, Impaired balance, Decreased mobility, Impaired judgement, Decreased safety awareness, Decreased cognition  Assessment: Pt is a 73 y.o. male seen for PT evaluation s/p admit to [facility] on 4/11/2024. Pt was admitted with a primary dx of: chest pain.  PT now consulted for assessment of mobility and d/c needs. Pt with Up and OOB as tolerated  orders.  Pts current comorbidities effecting treatment include: chromic suprapubic catheter, HTN, MS< type 2 DM, leukocytosis. 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HCC), Bladder infection, Bronchitis, Constipation, Cough, Diabetes (HCC), Diabetes mellitus (HCC), Dizziness, Dysfunction of eustachian tube, Erectile dysfunction of non-organic origin, Fatigue, Glaucoma, Hiatal hernia, Hypertension, Imbalance, Leg muscle spasm, MS (multiple sclerosis) (HCC), Multiple sclerosis (HCC), Nephrolithiasis, Neurogenic bladder, No natural teeth, Sinus pain, Spinal stenosis, Strain of thoracic region, Stroke (HCC), and Suprapubic catheter ([facility]). Pts current clinical presentation is Unstable/ Unpredictable (high complexity) due to Ongoing medical management for primary dx, Increased reliance on more restrictive AD compared to baseline, Decreased activity tolerance  compared to baseline, Fall risk, Increased assistance needed from caregiver at current time, Cog status, Trending lab values, Continuous pulse oximetry monitoring . Prior to admission, pt was @ STR. Upon evaluation, pt currently is requiring mod Ax2 for bed mobility; max Ax2 for transfers. Pt presents at PT eval functioning below baseline and currently w/ overall mobility deficits 2* to: BLE weakness, impaired balance, decreased endurance, decreased activity tolerance compared to baseline, decreased functional mobility tolerance compared to baseline, decreased safety awareness, impaired judgement, fall risk. Pt currently at a fall risk 2* to impairments listed above.  Pt will continue to benefit from skilled acute PT interventions to address stated impairments; to maximize functional mobility; for ongoing pt/ family training; and DME needs. At conclusion of PT session pt returned back in chair and chair alarm engaged with phone and call bell within reach. Pt denies any further questions at this time. The patient's AM-PAC Basic Mobility Inpatient Short Form Raw Score is 7. A Raw score of less than or equal to 16 suggests the patient may benefit from discharge to post-acute rehabilitation services. Please also refer to the recommendation of the Physical Therapist for safe discharge planning. PT to continue to follow pt t/o hospital stay, recommend level II resource intensity upon hospital D/C.  Barriers to Discharge: Inaccessible home environment, Decreased caregiver support     Rehab Resource Intensity Level, PT: II (Moderate Resource Intensity)    See flowsheet documentation for full assessment.

## 2024-04-12 NOTE — CASE MANAGEMENT
Case Management Discharge Planning Note    Patient name Mathew Rico  Location [facility] 919/[facility] 919-01 MRN [id number]  : 1950 Date 2024       Current Admission Date: 2024  Current Admission Diagnosis:Chest pain   Patient Active Problem List    Diagnosis Date Noted    Chest pain 2024    Acute encephalopathy 2024    Leukocytosis 2024    GERSON on CPAP 2024    Fall 2024    Primary hypertension 2024    Type 2 diabetes mellitus without complication, without long-term current use of insulin ([facility]) 2024    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[facility]) 2024    Multiple sclerosis ([facility]) 2024    Urinary retention 2024    Neck pain 2024    Vitamin B deficiency 2024    Generalized weakness 2024    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    Fall 2023    Weakness 2023    Accidental fall from chair 2023    Constipation 2023    Chronic diastolic congestive heart failure ([facility]) 2023    Hyponatremia 2023    Excessive daytime sleepiness 2022    Shortness of breath 2022    Sepsis ([facility]) 2021    Pancreatic mass 2020    Pancreatic lesion 2020    Bladder stones 2019    Bladder neck contracture 2019    History of CVA (cerebrovascular accident) 10/21/2018    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[facility]) 2017    Diabetic neuropathy ([facility]) 2016    Chronic suprapubic catheter ([facility]) 2016    Thyroid nodule 2015    Cervical spinal stenosis 2014    Generalized anxiety disorder 2014    Obstructive sleep apnea 2013    Benign colon polyp 2012    Esophageal reflux 2012    Fatty liver 2012    Glaucoma 2012    Hyperlipidemia 2012    Multiple sclerosis ([facility]) 2012    Type 2 diabetes mellitus with diabetic polyneuropathy, without long-term current use of insulin ([facility]) 2012    Primary hypertension 2012      LOS (days): 0  Geometric Mean  LOS (GMLOS) (days): 2.9  Days to GMLOS:2.4     OBJECTIVE:  Risk of Unplanned Readmission Score: 49.1         Current admission status: Inpatient   Preferred Pharmacy:   [id number] [place] [facility] [address] [facility]  [address] [facility] [address]  Phone: [phone] Fax: [phone]    [place], [place] - [address]  [address] [facility] [address]  Phone: [phone] Fax: [phone]    [facility] [place] - [address]  [facility],  [address]  [facility],  [address] [facility] [address]  Phone: [phone] Fax: [phone]     [facility] [place], [place] - [address] [place] [address]  Phone: [phone] Fax: [phone]    Primary Care Provider: Carolina Kumari MD    Primary Insurance: [facility]  Secondary Insurance:     DISCHARGE DETAILS:         Other Referral/Resources/Interventions Provided:  Referral Comments: Called [employer] spoke to pt's  Renay Soler - machelle re: DCP - anticipate return to [facility]/possible dc on Monday pending pt's medical course.

## 2024-04-12 NOTE — ASSESSMENT & PLAN NOTE
Likely secondary to pna, +/- acute cystitis  Pt did not meet sir criteria  S/p CT chest with b/l lower lobes opacities  S/p CT a/p: with distended urinary bladder with surrounding perivesicular fluid   S/p UA with pyuria though positive chronic indwelling SPC thus would expect chronic colonization  Will place on cefepime/vancomycin  Check urine legionella  Check MRSA nares, if neg consider de-escalation of abx to cefepime  F/u urine cx  F/u blood cx, unfortunately was not obtained prior to abx administration  Cont to trend wbc

## 2024-04-12 NOTE — ASSESSMENT & PLAN NOTE
"Lab Results   Component Value Date    HGBA1C 6.4 (H) 03/26/2024       No results for input(s): \"POCGLU\" in the last 72 hours.    Blood Sugar Average: Last 72 hrs:  Chronically on metformin, jardiance  ISS while hospitalized  Cont gabapentin    "

## 2024-04-12 NOTE — UTILIZATION REVIEW
NOTIFICATION OF INPATIENT ADMISSION   AUTHORIZATION REQUEST   SERVICING FACILITY:   [facility]  Address: [address] [place] [address]  Tax ID: [id number]  NPI: [id number] ATTENDING PROVIDER:  Attending Name and NPI#: Luz Jraa Md [[id number]]  Address: [address] [place] [address]  Phone: [phone]   ADMISSION INFORMATION:  Place of Service: Inpatient [facility]  Place of Service Code: 21  Inpatient Admission Date/Time: 4/12/24  3:38 AM  Discharge Date/Time: No discharge date for patient encounter.  Admitting Diagnosis Code/Description:  UTI (urinary tract infection) [N39.0]  Chest pain [R07.9]  Esophagitis [K20.90]  Atypical chest pain [R07.89]  Suprapubic catheter dysfunction, initial encounter ([facility]) [T83.010A]     UTILIZATION REVIEW CONTACT:  Lester Desai, Utilization   Network Utilization Review Department  Phone: [phone]  Fax: [phone]  Email: [email or web address]  Contact for approvals/pending authorizations, clinical reviews, and discharge.     PHYSICIAN ADVISORY SERVICES:  Medical Necessity Denial & Tkks-tu-Cixh Review  Phone: [phone]  Fax: [phone]  Email: [email or web address]     DISCHARGE SUPPORT TEAM:  For Patients Discharge Needs & Updates  Phone: [phone] opt. 2 Fax: [phone]  Email: [email or web address]

## 2024-04-12 NOTE — OCCUPATIONAL THERAPY NOTE
Occupational Therapy Evaluation     Patient Name: Mathew iRco  Today's Date: 4/12/2024  Problem List  Principal Problem:    Chest pain  Active Problems:    Chronic suprapubic catheter (HCC)    Primary hypertension    Multiple sclerosis (HCC)    Obstructive sleep apnea    Type 2 diabetes mellitus with diabetic polyneuropathy, without long-term current use of insulin (HCC)    Leukocytosis    Past Medical History  Past Medical History:   Diagnosis Date    Acute laryngitis     Acute nonsuppurative otitis media, unspecified laterality     Arm weakness     Arthritis     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HCC)     Bladder infection     Bronchitis     Constipation     Cough     Diabetes (HCC)     Diabetes mellitus (HCC)     Dizziness     Dysfunction of eustachian tube     Erectile dysfunction of non-organic origin     Fatigue     Glaucoma     Hiatal hernia     Hypertension     Imbalance     Leg muscle spasm     MS (multiple sclerosis) (HCC)     Multiple sclerosis (HCC)     Nephrolithiasis     Neurogenic bladder     No natural teeth     Sinus pain     Spinal stenosis     Strain of thoracic region     Stroke (HCC)     Suprapubic catheter (HCC)      Past Surgical History  Past Surgical History:   Procedure Laterality Date    APPENDECTOMY      BRAIN SURGERY      Coil placed in aneurysm    CEREBRAL ANEURYSM REPAIR      CYSTOSCOPY      CYSTOSCOPY      CYSTOSCOPY  06/11/2018    CYSTOSCOPY  01/15/2021    EYE SURGERY      transscleral cyclophotocoagulation noncontact YAG laser    IR SUPRAPUBIC CATHETER CHECK/CHANGE/REINSERTION/UPSIZE  3/28/2024    MYRINGOTOMY      with ventilation tube insertion    HI LITHOLAPAXY SMPL/SM <2.5 CM N/A 5/7/2019    Procedure: CYSTOSCOPY, holmium laser litholapaxy of bladder stones, EXCHANGE OF SP TUBE;  Surgeon: Javy Jefrfies MD;  [facility]: [facility];  Service: Urology    SUPRAPUBIC CATHETER INSERTION          04/12/24 0933   OT Last Visit   OT Visit Date 04/12/24   Note Type   Note type Evaluation    Pain Assessment   Pain Assessment Tool 0-10   Pain Score No Pain   Restrictions/Precautions   Weight Bearing Precautions Per Order No   Other Precautions Cognitive;Chair Alarm;Bed Alarm;Multiple lines;Fall Risk;Contact/isolation   Home Living   Type of Home House   Home Layout One level;Performs ADLs on one level;Able to live on main level with bedroom/bathroom  (1 DAI)   Bathroom Shower/Tub Walk-in shower   Bathroom Toilet Standard   Bathroom Equipment Grab bars in shower;Shower chair;Toilet raiser;Grab bars around toilet   Home Equipment Wheelchair-manual   Additional Comments Pt was admitted from [facility]. The above information is pt's baseline.   Prior Function   Level of [place] Modified independent with wheelchair;Needs assistance with ADLs;Needs assistance with IADLS   Lives With Family  (brother and sister)   Receives Help From Family   IADLs Family/Friend/Other provides transportation;Family/Friend/Other provides meals;Family/Friend/Other provides medication management   Falls in the last 6 months 1 to 4   Vocational Retired   Lifestyle   Autonomy Pt reports requiring A w/ ADLs and IADLs and mod I w/ WC for fxnl mobility at baseline; - driving. Pt also reports SBA for SPT in/out of WC at baseline.   Reciprocal Relationships Pt lives w/ his brother and sister.   Service to Others Pt is retired.   Intrinsic Gratification Pt enjoys watching TV and drinking coffee.   General   Family/Caregiver Present No   ADL   Where Assessed Edge of bed   Eating Assistance 5  Supervision/Setup   Grooming Assistance 4  Minimal Assistance   UB Bathing Assistance 3  Moderate Assistance   LB Bathing Assistance 2  Maximal Assistance   UB Dressing Assistance 3  Moderate Assistance   LB Dressing Assistance 2  Maximal Assistance   Toileting Assistance  2  Maximal Assistance   Bed Mobility   Supine to Sit 3  Moderate assistance   Additional items Assist x 2;HOB elevated;Bedrails;Increased time required;Verbal  cues;LE management   Sit to Supine Unable to assess   Additional Comments Noted posterior and R lateral lean while seated EOB. Pt seated OOB in chair at end of OT evaluation w/ all needs within reach and alarm activated.   Transfers   Sit to Stand Unable to assess   Stand to Sit Unable to assess   Sit pivot 2  Maximal assistance   Additional items Assist x 2;Increased time required;Verbal cues   Additional Comments Pt performed sit pivot transfer from EOb to chair w/ b/l HHA.   Balance   Static Sitting Poor   Dynamic Sitting Poor -   Activity Tolerance   Activity Tolerance Patient limited by fatigue   Medical Staff Made Aware PT Mattie, due to pt's medical complexity and multiple comorbidities   Nurse Made Aware RN clearance prior to session   RUE Assessment   RUE Assessment X  (limited shoulder flexion AROM and generalized weakness; able to self feed)   LUE Assessment   LUE Assessment X  (limited shoulder flexion AROM and generalized weakness; able to self feed)   Hand Function   Gross Motor Coordination Impaired   Fine Motor Coordination Functional   Cognition   Overall Cognitive Status Impaired   Arousal/Participation Alert;Responsive;Cooperative   Attention Attends with cues to redirect   Orientation Level Oriented to person;Disoriented to place;Disoriented to time;Disoriented to situation  (Pt was generally oriented to time as he stated it was April.)   Memory Decreased recall of precautions;Decreased recall of recent events;Decreased short term memory   Following Commands Follows one step commands with increased time or repetition   Comments Pt was pleasant, cooperative, and willing to participate in OT evaluation.   Assessment   Limitation Decreased ADL status;Decreased UE ROM;Decreased UE strength;Decreased cognition;Decreased endurance;Decreased self-care trans;Decreased high-level ADLs   Assessment Pt is a 73 y.o. male seen for OT evaluation s/p admission to [facility] on 4/11/2024. Pt diagnosed  with Chest pain. 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HCC), Bladder infection, Bronchitis, Constipation, Cough, Diabetes (HCC), Diabetes mellitus (HCC), Dizziness, Dysfunction of eustachian tube, Erectile dysfunction of non-organic origin, Fatigue, Glaucoma, Hiatal hernia, Hypertension, Imbalance, Leg muscle spasm, MS (multiple sclerosis) ([facility]), Multiple sclerosis ([facility]), Nephrolithiasis, Neurogenic bladder, No natural teeth, Sinus pain, Spinal stenosis, Strain of thoracic region, Stroke ([facility]), and Suprapubic catheter ([facility]). Pt was [facility] from [facility]. At baseline, pt living with his brother and sister in a 1 SH w/ 1 DAI. Pt reports requiring A w/ ADLs and IADLs and mod I w/ WC for fxnl mobility at baseline; - driving. Pt also reports SBA for SPT in/out of WC at baseline. Pt agreeable and willing to participate in OT evaluation. During evaluation, pt was S for eating, min A for grooming tasks, mod A for UB ADLs, and max A for LB ADLs and toileting. Pt also required mod Ax2 for bed mobility and max Ax2 for sit pivot transfer w/ b/l HHA. Performance deficits that affect the pt’s occupational performance during the initial evaluation include decreased ADL status, decreased activity tolerance, decreased endurance, decreased sitting tolerance, decreased sitting balance, decreased standing tolerance, decreased standing balance, decreased transfer skills, decreased fxnl mobility, generalized weakness , decreased UE ROM/strength, and impaired cognition. Based on pt’s functional performance and deficits the following occupations will be addressed in OT treatments in order to maximize pt’s independence and overall occupational performance: grooming, bathing/showering, toileting and toilet hygiene, dressing, and functional mobility. Goals are listed  below.  From OT perspective, recommend Level II (Moderate Resource Intensity) upon d/c when pt medically stable to d/c from acute care. Will continue to follow.   Goals   Patient Goals to get OOB   LTG Time Frame 10-14   Plan   Treatment Interventions ADL retraining;Functional transfer training;UE strengthening/ROM;Endurance training;Cognitive reorientation;Patient/family training;Equipment evaluation/education;Compensatory technique education;Continued evaluation;Energy conservation;Activityengagement   Goal Expiration Date 04/26/24   OT Treatment Day 0   OT Frequency 2-3x/wk   Discharge Recommendation   Rehab Resource Intensity Level, OT II (Moderate Resource Intensity)   -PAC Daily Activity Inpatient   Lower Body Dressing 2   Bathing 2   Toileting 2   Upper Body Dressing 2   Grooming 3   Eating 3   Daily Activity Raw Score 14   Daily Activity Standardized Score (Calc for Raw Score >=11) 33.39   AM-PAC Applied Cognition Inpatient   Following a Speech/Presentation 3   Understanding Ordinary Conversation 4   Taking Medications 3   Remembering Where Things Are Placed or Put Away 3   Remembering List of 4-5 Errands 2   Taking Care of Complicated Tasks 2   Applied Cognition Raw Score 17   Applied Cognition Standardized Score 36.52       The patient's raw score on the -PAC Daily Activity Inpatient Short Form is 14. A raw score of less than 19 suggests the patient may benefit from discharge to post-acute rehabilitation services. Please refer to the recommendation of the Occupational Therapist for safe discharge planning.    Goals:     - Pt will complete UB ADLs w/ S to maximize independence and return home.     - Pt will complete LB ADLs w/ mod A to maximize independence and return home.      - Pt will complete toileting routine (transfers, hygiene, and clothing management) w/ mod A to maximize independence and return to prior level of function.     - Pt will complete bed mobility supine >< sit w/ Ax1 to maximize  independence and return home.     - Pt will transfer to bed, chair, and toilet w/ Ax1 using AD / DME as needed to maximize independence and reduce burden of care.      - Pt will increase activity tolerance (and sitting tolerance) by eating all meals OOB in the chair.       - Pt will tolerate therapeutic activities for greater than 30 minutes in order to increase tolerance for functional activities.      - Pt will participate in ongoing OT evaluation of cognitive skills to assist with safe d/c planning/recommendations.      Samantha Malcolm MS, OTR/L

## 2024-04-12 NOTE — CASE MANAGEMENT
Case Management Discharge Planning Note    Patient name Mathew Rico  Location [facility] 919/[facility] 919-01 MRN [id number]  : 1950 Date 2024       Current Admission Date: 2024  Current Admission Diagnosis:Chest pain   Patient Active Problem List    Diagnosis Date Noted    Chest pain 2024    Acute encephalopathy 2024    Leukocytosis 2024    GERSON on CPAP 2024    Fall 2024    Primary hypertension 2024    Type 2 diabetes mellitus without complication, without long-term current use of insulin ([facility]) 2024    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[facility]) 2024    Multiple sclerosis ([facility]) 2024    Urinary retention 2024    Neck pain 2024    Vitamin B deficiency 2024    Generalized weakness 2024    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    Fall 2023    Weakness 2023    Accidental fall from chair 2023    Constipation 2023    Chronic diastolic congestive heart failure ([facility]) 2023    Hyponatremia 2023    Excessive daytime sleepiness 2022    Shortness of breath 2022    Sepsis ([facility]) 2021    Pancreatic mass 2020    Pancreatic lesion 2020    Bladder stones 2019    Bladder neck contracture 2019    History of CVA (cerebrovascular accident) 10/21/2018    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[facility]) 2017    Diabetic neuropathy ([facility]) 2016    Chronic suprapubic catheter ([facility]) 2016    Thyroid nodule 2015    Cervical spinal stenosis 2014    Generalized anxiety disorder 2014    Obstructive sleep apnea 2013    Benign colon polyp 2012    Esophageal reflux 2012    Fatty liver 2012    Glaucoma 2012    Hyperlipidemia 2012    Multiple sclerosis ([facility]) 2012    Type 2 diabetes mellitus with diabetic polyneuropathy, without long-term current use of insulin ([facility]) 2012    Primary hypertension 2012      LOS (days): 0  Geometric Mean  LOS (GMLOS) (days): 2.9  Days to GMLOS:2.4     OBJECTIVE:  Risk of Unplanned Readmission Score: 49.1         Current admission status: Inpatient   Preferred Pharmacy:   [facility]/pharmacy #[id number] - [place] - [address] [facility]  [address] [facility] [address]  Phone: [phone] Fax: [phone]    [place], [place] - [address]  [address] [facility] [address]  Phone: [phone] Fax: [phone]    [facility] [place] - [address]  [facility],  [address]  [facility],  [address] [facility] [address]  Phone: [phone] Fax: [phone]     [facility] [place], [place] - [address] [place] [address]  Phone: [phone] Fax: [phone]    Primary Care Provider: Carolina Kumari MD    Primary Insurance: [facility]  Secondary Insurance:     DISCHARGE DETAILS:       Other Referral/Resources/Interventions Provided:  Referral Comments: Pt accepted by Bette [facility]pe pending therapy evals.  Reserved in [place].

## 2024-04-12 NOTE — PLAN OF CARE
Problem: Potential for Falls  Goal: Patient will remain free of falls  Description: INTERVENTIONS:  - Educate patient/family on patient safety including physical limitations  - Instruct patient to call for assistance with activity   - Consult OT/PT to assist with strengthening/mobility   - Keep Call bell within reach  - Keep bed low and locked with side rails adjusted as appropriate  - Keep care items and personal belongings within reach  - Initiate and maintain comfort rounds  - Make Fall Risk Sign visible to staff  - Apply yellow socks and bracelet for high fall risk patients  - Consider moving patient to room near nurses station  Outcome: Progressing     Problem: PAIN - ADULT  Goal: Verbalizes/displays adequate comfort level or baseline comfort level  Description: Interventions:  - Encourage patient to monitor pain and request assistance  - Assess pain using appropriate pain scale  - Administer analgesics based on type and severity of pain and evaluate response  - Implement non-pharmacological measures as appropriate and evaluate response  - Consider cultural and social influences on pain and pain management  - Notify physician/advanced practitioner if interventions unsuccessful or patient reports new pain  Outcome: Progressing     Problem: INFECTION - ADULT  Goal: Absence or prevention of progression during hospitalization  Description: INTERVENTIONS:  - Assess and monitor for signs and symptoms of infection  - Monitor lab/diagnostic results  - Monitor all insertion sites, i.e. indwelling lines, tubes, and drains  - Monitor endotracheal if appropriate and nasal secretions for changes in amount and color  - [place] appropriate cooling/warming therapies per order  - Administer medications as ordered  - Instruct and encourage patient and family to use good hand hygiene technique  - Identify and instruct in appropriate isolation precautions for identified infection/condition  Outcome: Progressing  Goal: Absence  of fever/infection during neutropenic period  Description: INTERVENTIONS:  - Monitor WBC    Outcome: Progressing     Problem: SAFETY ADULT  Goal: Patient will remain free of falls  Description: INTERVENTIONS:  - Educate patient/family on patient safety including physical limitations  - Instruct patient to call for assistance with activity   - Consult OT/PT to assist with strengthening/mobility   - Keep Call bell within reach  - Keep bed low and locked with side rails adjusted as appropriate  - Keep care items and personal belongings within reach  - Initiate and maintain comfort rounds  - Make Fall Risk Sign visible to staff    - Apply yellow socks and bracelet for high fall risk patients  - Consider moving patient to room near nurses station  Outcome: Progressing  Goal: Maintain or return to baseline ADL function  Description: INTERVENTIONS:  -  Assess patient's ability to carry out ADLs; assess patient's baseline for ADL function and identify physical deficits which impact ability to perform ADLs (bathing, care of mouth/teeth, toileting, grooming, dressing, etc.)  - Assess/evaluate cause of self-care deficits   - Assess range of motion  - Assess patient's mobility; develop plan if impaired  - Assess patient's need for assistive devices and provide as appropriate  - Encourage maximum independence but intervene and supervise when necessary  - Involve family in performance of ADLs  - Assess for home care needs following discharge   - Consider OT consult to assist with ADL evaluation and planning for discharge  - Provide patient education as appropriate  Outcome: Progressing  Goal: Maintains/Returns to pre admission functional level  Description: INTERVENTIONS:  - Perform AM-PAC 6 Click Basic Mobility/ Daily Activity assessment daily.  - Set and communicate daily mobility goal to care team and patient/family/caregiver.   - Collaborate with rehabilitation services on mobility goals if consulted  -- Out of bed for  toileting  - Record patient progress and toleration of activity level   Outcome: Progressing

## 2024-04-12 NOTE — ASSESSMENT & PLAN NOTE
"Lab Results   Component Value Date    HGBA1C 6.4 (H) 03/26/2024       No results for input(s): \"POCGLU\" in the last 72 hours.    Blood Sugar Average: Last 72 hrs:      "

## 2024-04-12 NOTE — PHYSICAL THERAPY NOTE
Physical Therapy Evaluation     Patient's Name: Mathew Rico    Admitting Diagnosis  UTI (urinary tract infection) [N39.0]  Chest pain [R07.9]  Esophagitis [K20.90]  Atypical chest pain [R07.89]  Suprapubic catheter dysfunction, initial encounter ([facility]ly [facility]) [T83.010A]    Problem List  Patient Active Problem List   Diagnosis    Diabetic neuropathy ([facility])    Cervical spinal stenosis    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HCC)    Benign colon polyp    Chronic suprapubic catheter ([facility])    Esophageal reflux    Fatty liver    Generalized anxiety disorder    Glaucoma    Hyperlipidemia    Primary hypertension    Multiple sclerosis (HCC)    Obstructive sleep apnea    Thyroid nodule    Type 2 diabetes mellitus with diabetic polyneuropathy, without long-term current use of insulin ([facility])    History of CVA (cerebrovascular accident)    Bladder stones    Bladder neck contracture    Pancreatic mass    Pancreatic lesion    Sepsis ([facility])    Excessive daytime sleepiness    Shortness of breath    Hyponatremia    Chronic diastolic congestive heart failure (HCC)    Constipation    Accidental fall from chair    Fall    Weakness    Stercoral colitis    Generalized weakness    Acute encephalopathy    Leukocytosis    GERSON on CPAP    Fall    Primary hypertension    Type 2 diabetes mellitus without complication, without long-term current use of insulin (HCC)    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HCC)    Multiple sclerosis ([facility])    Urinary retention    Neck pain    Vitamin B deficiency    Chest pain       Past Medical History  Past Medical History:   Diagnosis Date    Acute laryngitis     Acute nonsuppurative otitis media, unspecified laterality     Arm weakness     Arthritis     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HCC)     Bladder infection     Bronchitis     Constipation     Cough     Diabetes (HCC)     Diabetes mellitus (HCC)     Dizziness     Dysfunction of eustachian tube     Erectile dysfunction of non-organic origin     Fatigue     Glaucoma     Hiatal hernia      Hypertension     Imbalance     Leg muscle spasm     MS (multiple sclerosis) (HCC)     Multiple sclerosis (HCC)     Nephrolithiasis     Neurogenic bladder     No natural teeth     Sinus pain     Spinal stenosis     Strain of thoracic region     Stroke (HCC)     Suprapubic catheter (HCC)        Past Surgical History  Past Surgical History:   Procedure Laterality Date    APPENDECTOMY      BRAIN SURGERY      Coil placed in aneurysm    CEREBRAL ANEURYSM REPAIR      CYSTOSCOPY      CYSTOSCOPY      CYSTOSCOPY  06/11/2018    CYSTOSCOPY  01/15/2021    EYE SURGERY      transscleral cyclophotocoagulation noncontact YAG laser    IR SUPRAPUBIC CATHETER CHECK/CHANGE/REINSERTION/UPSIZE  3/28/2024    MYRINGOTOMY      with ventilation tube insertion    OR LITHOLAPAXY SMPL/SM <2.5 CM N/A 5/7/2019    Procedure: CYSTOSCOPY, holmium laser litholapaxy of bladder stones, EXCHANGE OF SP TUBE;  Surgeon: Javy Jeffries MD;  Location: [facility];  Service: Urology    SUPRAPUBIC CATHETER INSERTION          04/12/24 0934   PT Last Visit   PT Visit Date 04/12/24   Note Type   Note type Evaluation   Pain Assessment   Pain Assessment Tool 0-10   Pain Score No Pain   Restrictions/Precautions   Weight Bearing Precautions Per Order No   Other Precautions Cognitive;Chair Alarm;Bed Alarm;Multiple lines;Fall Risk;Contact/isolation   Home Living   Type of Home House   Home Layout One level;Performs ADLs on one level;Able to live on main level with bedroom/bathroom  (1 DAI)   Bathroom Shower/Tub Walk-in shower   Bathroom Equipment Grab bars in shower;Shower chair   Home Equipment Wheelchair-manual   Additional Comments above is pt's baseline, PTA pt was at [facility]. pt is ? historian- will need to confirm with CM   Prior Function   Level of [place] Needs assistance with ADLs;Needs assistance with IADLS;Independent with functional mobility  (reports being able to get in/out of WC at S level @ baseline, occasionally requires Ax1; able to self  aba WC)   Lives With Family  (brother and sister)   Receives Help From Family   IADLs Family/Friend/Other provides transportation;Family/Friend/Other provides meals;Family/Friend/Other provides medication management   Falls in the last 6 months 0  (per pt report, per EMR @ least 1)   Vocational Retired   Comments above is pt's baseline, PTA pt was at [facility]   General   Family/Caregiver Present No   Cognition   Overall Cognitive Status Impaired   Arousal/Participation Alert   Orientation Level Oriented to person;Disoriented to place;Disoriented to time;Disoriented to situation  (oriented to month (April))   Memory Decreased recall of recent events;Decreased recall of precautions;Decreased short term memory   Following Commands Follows one step commands with increased time or repetition   Comments pt pleasant and cooperative   RLE Assessment   RLE Assessment   (functionally 2/5)   LLE Assessment   LLE Assessment   (functionally 2/5)   Bed Mobility   Supine to Sit 3  Moderate assistance   Additional items Assist x 2;HOB elevated;Bedrails;Increased time required;Verbal cues;LE management   Sit to Supine Unable to assess   Additional Comments pt supine in bed upon arrival. Pt required min- mod A to correct posterior lean EOB. Pt left sitting OOB in recliner with all needs wihtin reach- alarm on   Transfers   Sit to Stand Unable to assess   Stand to Sit Unable to assess   Sit pivot 2  Maximal assistance   Additional items Assist x 2;Increased time required;Verbal cues   Additional Comments pt preformed sit pivot with b/l HHA. Pt with increased retropulsion + R lateral lean. Also extensor pattern of LEs once in chiar with quick elevation of leg rest required- educated nursing staff to slide back to bed with easy  board for increased safety   Ambulation/Elevation   Gait pattern Not appropriate  (non-ambulatory)   Balance   Static Sitting Poor   Dynamic Sitting Poor -   Endurance Deficit   Endurance Deficit Yes    Activity Tolerance   Activity Tolerance Patient limited by fatigue   Medical Staff Made Aware KARLA Singh; co-session completed this date 2* increased medical complexity and multiple co-morbidities   Nurse Made Aware RN cleared pt   Assessment   Prognosis Fair   Problem List Decreased strength;Decreased endurance;Impaired balance;Decreased mobility;Impaired judgement;Decreased safety awareness;Decreased cognition   Assessment Pt is a 73 y.o. male seen for PT evaluation s/p admit to [facility] on [facility]11[facility]24. Pt was admitted with a primary dx of: chest pain.  PT now consulted for assessment of mobility and d/c needs. Pt with Up and OOB as tolerated  orders.  Pts current comorbidities effecting treatment include: chromic suprapubic catheter, HTN, MS< type 2 DM, leukocytosis. 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HCC), Bladder infection, Bronchitis, Constipation, Cough, Diabetes (HCC), Diabetes mellitus (HCC), Dizziness, Dysfunction of eustachian tube, Erectile dysfunction of non-organic origin, Fatigue, Glaucoma, Hiatal hernia, Hypertension, Imbalance, Leg muscle spasm, MS (multiple sclerosis) (HCC), Multiple sclerosis (HCC), Nephrolithiasis, Neurogenic bladder, No natural teeth, Sinus pain, Spinal stenosis, Strain of thoracic region, Stroke (HCC), and Suprapubic catheter (HCC). Pts current clinical presentation is Unstable/ Unpredictable (high complexity) due to Ongoing medical management for primary dx, Increased reliance on more restrictive AD compared to baseline, Decreased activity tolerance compared to baseline, Fall risk, Increased assistance needed from caregiver at current time, Cog status, Trending lab values, Continuous pulse oximetry monitoring . Prior to admission, pt was @ [facility]. Upon evaluation, pt currently is requiring mod Ax2 for bed mobility; max Ax2 for transfers. Pt  presents at PT eval functioning below baseline and currently w/ overall mobility deficits 2* to: BLE weakness, impaired balance, decreased endurance, decreased activity tolerance compared to baseline, decreased functional mobility tolerance compared to baseline, decreased safety awareness, impaired judgement, fall risk. Pt currently at a fall risk 2* to impairments listed above.  Pt will continue to benefit from skilled acute PT interventions to address stated impairments; to maximize functional mobility; for ongoing pt/ family training; and DME needs. At conclusion of PT session pt returned back in chair and chair alarm engaged with phone and call bell within reach. Pt denies any further questions at this time. The patient's AM-PAC Basic Mobility Inpatient Short Form Raw Score is 7. A Raw score of less than or equal to 16 suggests the patient may benefit from discharge to post-acute rehabilitation services. Please also refer to the recommendation of the Physical Therapist for safe discharge planning. PT to continue to follow pt t/o hospital stay, recommend level II resource intensity upon hospital D/C.   Barriers to Discharge Inaccessible home environment;Decreased caregiver support   Goals   Patient Goals to get OOB   STG Expiration Date 04/26/24   Short Term Goal #1 STG 1. Pt will be able to perform bed mobility tasks with min A in order to improve overall functional mobility and assist in safe d/c. STG 2. Pt with sit EOB for at least 25 minutes at S level in order to strengthen abdominal musculature and assist in future transfers/ ambulation. STG 3. Pt will be able to perform functional transfer with min A in order to improve overall functional mobility and assist in safe d/c. STG 4. Pt will be able to navigate at least 150 feet with WC A S level in order to improve overall functional mobility and assist in safe d/c. STG 5. Pt will improve sitting/standing static/dynamic balance 1/2 grade in order to improve  functional mobility and assist in safe d/c. STG 6. Pt will improve LE strength by 1/2 grade in order to improve functional mobility and assist in safe d/c.   PT Treatment Day 0   Plan   Treatment/Interventions Functional transfer training;LE strengthening/ROM;Therapeutic exercise;Endurance training;Patient/family training;Equipment eval/education;Bed mobility;Spoke to nursing;Spoke to case management;OT   PT Frequency 2-3x/wk   Discharge Recommendation   Rehab Resource Intensity Level, PT II (Moderate Resource Intensity)   Equipment Recommended Wheelchair   AM-PAC Basic Mobility Inpatient   Turning in Flat Bed Without Bedrails 2   Lying on Back to Sitting on Edge of Flat Bed Without Bedrails 1   Moving Bed to Chair 1   Standing Up From Chair Using Arms 1   Walk in Room 1   Climb 3-5 Stairs With Railing 1   Basic Mobility Inpatient Raw Score 7   [facility] Highest Level Of Mobility   -HLM Goal 2: Bed activities/Dependent transfer   -HLM Achieved 4: Move to chair/commode   Modified [place] Scale   Modified [place] Scale 4           Mattie Mcmanus, PT DPT

## 2024-04-12 NOTE — CASE MANAGEMENT
Case Management Discharge Planning Note    Patient name Mathew Rico  Location [facility]-01 MRN [id number]  : 1950 Date 2024       Current Admission Date: 2024  Current Admission Diagnosis:Chest pain   Patient Active Problem List    Diagnosis Date Noted    Chest pain 2024    Acute encephalopathy 2024    Leukocytosis 2024    GERSON on CPAP 2024    Fall 2024    Primary hypertension 2024    Type 2 diabetes mellitus without complication, without long-term current use of insulin ([facility]) 2024    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[facility]) 2024    Multiple sclerosis ([facility]) 2024    Urinary retention 2024    Neck pain 2024    Vitamin B deficiency 2024    Generalized weakness 2024    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    Fall 2023    Weakness 2023    Accidental fall from chair 2023    Constipation 2023    Chronic diastolic congestive heart failure ([facility]) 2023    Hyponatremia 2023    Excessive daytime sleepiness 2022    Shortness of breath 2022    Sepsis ([facility]) 2021    Pancreatic mass 2020    Pancreatic lesion 2020    Bladder stones 2019    Bladder neck contracture 2019    History of CVA (cerebrovascular accident) 10/21/2018    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[facility]) 2017    Diabetic neuropathy ([facility]) 2016    Chronic suprapubic catheter ([facility]) 2016    Thyroid nodule 2015    Cervical spinal stenosis 2014    Generalized anxiety disorder 2014    Obstructive sleep apnea 2013    Benign colon polyp 2012    Esophageal reflux 2012    Fatty liver 2012    Glaucoma 2012    Hyperlipidemia 2012    Multiple sclerosis ([facility]) 2012    Type 2 diabetes mellitus with diabetic polyneuropathy, without long-term current use of insulin ([facility]) 2012    Primary hypertension 2012      LOS (days): 0  Geometric Mean  LOS (GMLOS) (days): 2.9  Days to GMLOS:2.5     OBJECTIVE:  Risk of Unplanned Readmission Score: 49.1         Current admission status: Inpatient   Preferred Pharmacy:   [id number] - [place] - [address]  [address] [address]  Phone: [phone] Fax: [phone]    [place], [place] - [address]  [address] [address]  Phone: [phone] Fax: [phone]    [facility] [place], [place] - [address],  [address]  [address] [facility] [address]  Phone: [phone] Fax: [phone]     [facility] [place] - [address]  [address] [address]  Phone: [phone] Fax: [phone]    Primary Care Provider: Carolina Kumari MD    Primary Insurance: [facility]  Secondary Insurance:     DISCHARGE DETAILS:         Contacts  Patient Contacts: Guzman  Relationship to Patient:: Family  Contact Method: Phone  Phone Number: [phone]  Reason/Outcome: Discharge Planning, Referral         Other Referral/Resources/Interventions Provided:  Referral Comments: Spoke to pt's brother Guzman.  Confirmed pt is w/c bound at baseline but was able to transfer w/minimal assistance.  Confirmed pt lives w/him & their sister in [place].  Ok for pt to return to [facility] if recommended - therapy evals pending.  If HH recommended agreeable to blanket referral in [facility].  Referral sent to [facility] for review.   CM to follow for dcp.

## 2024-04-12 NOTE — ED PROVIDER NOTES
History  Chief Complaint   Patient presents with    Chest Pain     Reports CP that started around 2230 just laying in bed watching TV. Reports crushing 10/10 pain. Reports heartburn the last few days      73 year old male with PMHx of diabetes, CVA, MS, DM, neurogenic bladder with suprapubic catheter in place presents to for evaluation of chest pain and abdominal pressure.  Patient notes that the nonradiating chest pain and associated shortness of breath started while he was watching TV and describes it as a severe crushing chest pain.  Patient denies fever, chills, nausea, vomiting      History provided by:  Patient  Chest Pain  Associated symptoms: shortness of breath    Associated symptoms: no back pain, no cough, no diaphoresis, no dizziness, no dysphagia, no fever, no headache, no nausea, no palpitations and not vomiting  Abdominal pain: abdominal pressure.      Prior to Admission Medications   Prescriptions Last Dose Informant Patient Reported? Taking?   Empagliflozin (JARDIANCE) 10 MG TABS tablet   Yes Yes   Sig: Take 10 mg by mouth every morning   Ergocalciferol (VITAMIN D2 PO)  Self Yes No   Sig: Take 50,000 Units by mouth once a week   acetaminophen (TYLENOL) 325 mg tablet   No No   Sig: Take 2 tablets (650 mg total) by mouth every 6 (six) hours as needed for mild pain   acetaminophen (TYLENOL) 325 mg tablet   No No   Sig: Take 3 tablets (975 mg total) by mouth every 8 (eight) hours   albuterol (2.5 mg/3 mL) 0.083 % nebulizer solution   No No   Sig: Take 3 mL (2.5 mg total) by nebulization every 6 (six) hours as needed for wheezing or shortness of breath   amLODIPine-atorvastatin (CADUET) 10-80 MG per tablet   Yes No   Sig: Take 1 tablet by mouth daily   baclofen 10 mg tablet   Yes Yes   Sig: Take 5 mg by mouth 3 (three) times a day   bisacodyl (DULCOLAX) 10 mg suppository   Yes No   Sig: Insert 10 mg into the rectum daily as needed for constipation   budesonide-formoterol (SYMBICORT) 160-4.5 mcg/act  inhaler  Outside Facility (Specify) Yes Yes   Sig: Inhale 2 puffs 2 (two) times a day Rinse mouth after use.   clopidogrel (PLAVIX) 75 mg tablet  Self No No   Sig: Take 1 tablet (75 mg total) by mouth daily   cyanocobalamin (VITAMIN B-12) 500 MCG tablet   No No   Sig: Take 1 tablet (500 mcg total) by mouth daily   gabapentin (NEURONTIN) 300 mg capsule  Self No No   Sig: Take 1 capsule (300 mg total) by mouth 2 (two) times a day   gabapentin (NEURONTIN) 300 mg capsule   No No   Sig: Take 2 capsules (600 mg total) by mouth daily at bedtime   latanoprost (XALATAN) 0.005 % ophthalmic solution   Yes No   Sig: Administer 1 drop to both eyes daily at bedtime   lidocaine (LIDODERM) 5 %   No No   Sig: Apply 1 patch topically over 12 hours daily Remove & Discard patch within 12 hours or as directed by MD   melatonin 3 mg  Self Yes No   Sig: Take 3 mg by mouth daily at bedtime as needed   metFORMIN (GLUCOPHAGE) 1000 MG tablet   Yes Yes   Sig: Take 1,000 mg by mouth 2 (two) times a day with meals   methenamine hippurate (HIPREX) 1 g tablet   No No   Sig: Take 1 tablet (1 g total) by mouth 2 (two) times a day with meals   mirtazapine (REMERON) 7.5 MG tablet  Outside Facility (Specify) Yes Yes   Sig: Take 7.5 mg by mouth daily at bedtime   pantoprazole (PROTONIX) 40 mg tablet   Yes No   Sig: Take 40 mg by mouth daily   polyethylene glycol (MIRALAX) 17 g packet   No No   Sig: Take 17 g by mouth daily   propranolol (INDERAL LA) 60 mg 24 hr capsule   Yes No   Sig: Take 60 mg by mouth daily   senna-docusate sodium (SENOKOT S) 8.6-50 mg per tablet   Yes No   Sig: Take 2 tablets by mouth daily at bedtime      Facility-Administered Medications: None       Past Medical History:   Diagnosis Date    Acute laryngitis     Acute nonsuppurative otitis media, unspecified laterality     Arm weakness     Arthritis     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HCC)     Bladder infection     Bronchitis     Constipation     Cough     Diabetes (HCC)     Diabetes  mellitus (HCC)     Dizziness     Dysfunction of eustachian tube     Erectile dysfunction of non-organic origin     Fatigue     Glaucoma     Hiatal hernia     Hypertension     Imbalance     Leg muscle spasm     MS (multiple sclerosis) (HCC)     Multiple sclerosis (HCC)     Nephrolithiasis     Neurogenic bladder     No natural teeth     Sinus pain     Spinal stenosis     Strain of thoracic region     Stroke (HCC)     Suprapubic catheter (HCC)        Past Surgical History:   Procedure Laterality Date    APPENDECTOMY      BRAIN SURGERY      Coil placed in aneurysm    CEREBRAL ANEURYSM REPAIR      CYSTOSCOPY      CYSTOSCOPY      CYSTOSCOPY  2018    CYSTOSCOPY  01/15/2021    EYE SURGERY      transscleral cyclophotocoagulation noncontact YAG laser    IR SUPRAPUBIC CATHETER CHECK/CHANGE/REINSERTION/UPSIZE  3/28/2024    MYRINGOTOMY      with ventilation tube insertion    DC LITHOLAPAXY SMPL/SM <2.5 CM N/A 2019    Procedure: CYSTOSCOPY, holmium laser litholapaxy of bladder stones, EXCHANGE OF SP TUBE;  Surgeon: Javy Jeffries MD;  Location: [facility];  Service: Urology    SUPRAPUBIC CATHETER INSERTION         Family History   Problem Relation Age of Onset    Heart attack Mother     Stroke Mother     Heart attack Father     Anuerysm Father         In Stomach     Aneurysm Father      I have reviewed and agree with the history as documented.    E-Cigarette/Vaping    E-Cigarette Use Never User      E-Cigarette/Vaping Substances    Nicotine No     THC No     CBD No     Flavoring No     Other No     Unknown No      Social History     Tobacco Use    Smoking status: Former     Current packs/day: 0.00     Average packs/day: 0.5 packs/day for 31.0 years (15.5 ttl pk-yrs)     Types: Cigarettes     Start date:      Quit date:      Years since quittin.3    Smokeless tobacco: Never   Vaping Use    Vaping status: Never Used   Substance Use Topics    Alcohol use: Not Currently    Drug use: No        Review of Systems    Constitutional:  Negative for appetite change, chills, diaphoresis, fever and unexpected weight change.   HENT:  Negative for dental problem, ear pain, facial swelling, sore throat and trouble swallowing.    Eyes:  Negative for pain and visual disturbance.   Respiratory:  Positive for shortness of breath. Negative for cough and chest tightness.    Cardiovascular:  Positive for chest pain. Negative for palpitations and leg swelling.   Gastrointestinal:  Negative for abdominal distention, constipation, diarrhea, nausea and vomiting. Abdominal pain: abdominal pressure.  Endocrine: Negative for polyuria.   Genitourinary:  Negative for difficulty urinating, dysuria and hematuria.   Musculoskeletal:  Negative for arthralgias and back pain.   Skin:  Negative for color change and rash.   Neurological:  Negative for dizziness, seizures, syncope, light-headedness and headaches.   Psychiatric/Behavioral:  Negative for confusion.    All other systems reviewed and are negative.      Physical Exam  ED Triage Vitals [04/11/24 2303]   Temperature Pulse Respirations Blood Pressure SpO2   98 °F (36.7 °C) 86 18 125/65 100 %      Temp Source Heart Rate Source Patient Position - Orthostatic VS BP Location FiO2 (%)   Oral Monitor Lying Right arm --      Pain Score       7             Orthostatic Vital Signs  Vitals:    04/14/24 1515 04/14/24 2206 04/15/24 0741 04/15/24 1518   BP: (!) 112/45 (!) 115/46 (!) 117/46 (!) 118/47   Pulse: 66 61 60 61   Patient Position - Orthostatic VS:           Physical Exam  Vitals and nursing note reviewed.   Constitutional:       General: He is not in acute distress.     Appearance: Normal appearance. He is well-developed. He is ill-appearing. He is not toxic-appearing or diaphoretic.      Comments: Pt appears comfortable    HENT:      Head: Normocephalic and atraumatic.      Right Ear: External ear normal.      Left Ear: External ear normal.      Nose: Nose normal.      Mouth/Throat:      Mouth: Mucous  membranes are moist.   Eyes:      General: No scleral icterus.        Right eye: No discharge.      Extraocular Movements: Extraocular movements intact.      Conjunctiva/sclera: Conjunctivae normal.   Cardiovascular:      Rate and Rhythm: Normal rate and regular rhythm.      Pulses: Normal pulses.      Heart sounds: No murmur heard.  Pulmonary:      Effort: Pulmonary effort is normal. No respiratory distress.      Breath sounds: Normal breath sounds. No wheezing.   Chest:      Chest wall: No tenderness.   Abdominal:      General: Abdomen is flat. There is no distension.      Palpations: Abdomen is soft.      Tenderness: There is abdominal tenderness in the suprapubic area.       Musculoskeletal:         General: No swelling, deformity or signs of injury. Normal range of motion.      Cervical back: Normal range of motion and neck supple. No rigidity or tenderness.      Right lower leg: No edema.      Left lower leg: No edema.   Skin:     General: Skin is warm and dry.      Capillary Refill: Capillary refill takes less than 2 seconds.   Neurological:      General: No focal deficit present.      Mental Status: He is alert and oriented to person, place, and time.   Psychiatric:         Mood and Affect: Mood normal.         ED Medications  Medications   dextrose 50 % IV solution **ADS Override Pull** (  Return to Cabinet 4/12/24 0951)   HYDROmorphone (DILAUDID) injection 0.5 mg (0.5 mg Intravenous Given 4/12/24 0138)   iohexol (OMNIPAQUE) 350 MG/ML injection (MULTI-DOSE) 100 mL (100 mL Intravenous Given 4/12/24 0216)   vancomycin (VANCOCIN) 1,750 mg in sodium chloride 0.9 % 500 mL IVPB (1,750 mg Intravenous New Bag 4/12/24 0357)   levofloxacin (LEVAQUIN) IVPB (premix in dextrose) 750 mg 150 mL (750 mg Intravenous New Bag 4/12/24 0922)       Diagnostic Studies  Results Reviewed       Procedure Component Value Units Date/Time    Urine culture [[id number]]  (Abnormal)  (Susceptibility) Collected: 04/12/24 0400    Lab Status:  Final result Specimen: Urine, Suprapubic catheter Updated: 04/16/24 0837     Urine Culture >100,000 cfu/ml Candida tropicalis      >100,000 cfu/ml Candida glabrata      20,000-29,000 cfu/ml Citrobacter freundii    Susceptibility       Candida tropicalis (1)       Antibiotic Interpretation Microscan   Method Status    ZID Performed  Yes  LIYA Final              Candida glabrata (3)       Antibiotic Interpretation Microscan   Method Status    ZID Performed  Yes  LIYA Final              Citrobacter freundii (4)       Antibiotic Interpretation Microscan   Method Status    ZID Performed  Yes  LIYA Final    Amoxicillin + Clavulanate Resistant 16/8 ug/ml LIYA Final    Ampicillin ($$) Resistant <=8.00 ug/ml LIYA Final    Ampicillin + Sulbactam ($) Resistant <=4/2 ug/ml LIYA Final    Aztreonam ($$$)  Susceptible <=4 ug/ml LIYA Final    Cefazolin ($) Resistant >16.00 ug/ml LIYA Final    Ceftazidime ($$) Susceptible <=1 ug/ml LIYA Final    Ceftriaxone ($$) Susceptible <=1.00 ug/ml LIYA Final    Cefuroxime ($$) Resistant <=4 ug/ml LIYA Final    Ciprofloxacin ($)  Susceptible <=0.25 ug/ml LIYA Final    Ertapenem ($$$) Susceptible <=0.5 ug/ml LIYA Final    Gentamicin ($$) Susceptible <=2 ug/ml LIYA Final    Levofloxacin ($) Susceptible <=0.50 ug/ml LIYA Final    Nitrofurantoin Susceptible <=32 ug/ml LIYA Final    Piperacillin + Tazobactam ($$$) Susceptible <=8 ug/ml LIYA Final    Tetracycline Susceptible <=4 ug/ml LIYA Final    Tobramycin ($) Susceptible <=2 ug/ml LIYA Final    Trimethoprim + Sulfamethoxazole ($$$) Susceptible <=0.5/9.5 ug/ml LIYA Final                       Urine Microscopic [[id number]]  (Abnormal) Collected: 04/12/24 0400    Lab Status: Final result Specimen: Urine, Suprapubic catheter Updated: 04/12/24 0521     RBC, UA Innumerable /hpf      WBC, UA Innumerable /hpf      Epithelial Cells Occasional /hpf      Bacteria, UA Moderate /hpf      WBC Clumps Present     Transitional Epithelial Cells Present     Budding Yeast Present    UA  w Reflex to Microscopic w Reflex to Culture [[id number]]  (Abnormal) Collected: 04/12/24 0400    Lab Status: Final result Specimen: Urine, Suprapubic catheter Updated: 04/12/24 0514     Color, UA Yellow     Clarity, UA Extra Turbid     Specific Gravity, UA 1.048     pH, UA 6.0     Leukocytes, UA Large     Nitrite, UA Negative     Protein,  (2+) mg/dl      Glucose,  (1/2%) mg/dl      Ketones, UA Negative mg/dl      Urobilinogen, UA <2.0 mg/dl      Bilirubin, UA Negative     Occult Blood, UA Moderate    Procalcitonin [[id number]]  (Normal) Collected: 04/12/24 0025    Lab Status: Final result Specimen: Blood from Arm, Left Updated: 04/12/24 0426     Procalcitonin 0.07 ng/ml     HS Troponin I 2hr [[id number]]  (Normal) Collected: 04/12/24 0243    Lab Status: Final result Specimen: Blood from Arm, Left Updated: 04/12/24 0313     hs TnI 2hr 5 ng/L      Delta 2hr hsTnI 0 ng/L     HS Troponin 0hr (reflex protocol) [[id number]]  (Normal) Collected: 04/12/24 0025    Lab Status: Final result Specimen: Blood from Arm, Left Updated: 04/12/24 0056     hs TnI 0hr 5 ng/L     Basic metabolic panel [[id number]]  (Abnormal) Collected: 04/12/24 0025    Lab Status: Final result Specimen: Blood from Arm, Left Updated: 04/12/24 0055     Sodium 137 mmol/L      Potassium 4.3 mmol/L      Chloride 105 mmol/L      CO2 20 mmol/L      ANION GAP 12 mmol/L      BUN 24 mg/dL      Creatinine 0.83 mg/dL      Glucose 111 mg/dL      Calcium 9.5 mg/dL      eGFR 86 ml/min/1.73sq m     Narrative:      National Kid[employer] Foundation guidelines for Chronic Kidney Disease (CKD):     Stage 1 with normal or high GFR (GFR > 90 mL/min/1.73 square meters)    Stage 2 Mild CKD (GFR = 60-89 mL/min/1.73 square meters)    Stage 3A Moderate CKD (GFR = 45-59 mL/min/1.73 square meters)    Stage 3B Moderate CKD (GFR = 30-44 mL/min/1.73 square meters)    Stage 4 Severe CKD (GFR = 15-29 mL/min/1.73 square meters)    Stage 5 End Stage CKD (GFR <15 mL/min/1.73  square meters)  Note: GFR calculation is accurate only with a steady state creatinine    Lipase [[id number]]  (Normal) Collected: 04/12/24 0025    Lab Status: Final result Specimen: Blood from Arm, Left Updated: 04/12/24 0054     Lipase 38 u/L     CBC and differential [[id number]]  (Abnormal) Collected: 04/12/24 0025    Lab Status: Final result Specimen: Blood from Arm, Left Updated: 04/12/24 0033     WBC 20.36 Thousand/uL      RBC 4.92 Million/uL      Hemoglobin 14.7 g/dL      Hematocrit 44.7 %      MCV 91 fL      MCH 29.9 pg      MCHC 32.9 g/dL      RDW 14.6 %      MPV 8.5 fL      Platelets 364 Thousands/uL      nRBC 0 /100 WBCs      Segmented % 71 %      Immature Grans % 1 %      Lymphocytes % 16 %      Monocytes % 6 %      Eosinophils Relative 5 %      Basophils Relative 1 %      Absolute Neutrophils 14.58 Thousands/µL      Absolute Immature Grans 0.13 Thousand/uL      Absolute Lymphocytes 3.28 Thousands/µL      Absolute Monocytes 1.27 Thousand/µL      Eosinophils Absolute 0.94 Thousand/µL      Basophils Absolute 0.16 Thousands/µL                    CT chest abdomen pelvis w contrast   Final Result by Jan Zavala MD (04/12 0310)      1.  Trace mild nodular opacities within bilateral lower lobes may be due to atypical infection or inflammatory.   2.  Thickening of the esophagus/stomach compatible with esophagitis/gastritis.   3.  Markedly distended urinary bladder with surrounding perivesicular fluid, correlate for suprapubic catheter malfunction and urinalysis for superimposed cystitis.      The study was marked in EPIC for immediate notification.         Workstation performed: [id number]               Procedures  Procedures      ED Course  ED Course as of 04/16/24 2019 Fri Apr 12, 2024   0214 hs TnI 0hr: 5   0214 WBC(!): 20.36             HEART Risk Score      Flowsheet Row Most Recent Value   Heart Score Risk Calculator    History 0 Filed at: 04/12/2024 2221   ECG 1 Filed at: 04/12/2024 2221   Age 2 Filed  at: 04/12/2024 2221   Risk Factors 2 Filed at: 04/12/2024 2221   Troponin 0 Filed at: 04/12/2024 2221   HEART Score 5 Filed at: 04/12/2024 2221                        SBIRT 22yo+      Flowsheet Row Most Recent Value   Initial Alcohol Screen: US AUDIT-C     1. How often do you have a drink containing alcohol? 0 Filed at: 04/11/2024 2306   2. How many drinks containing alcohol do you have on a typical day you are drinking?  0 Filed at: 04/11/2024 2306   3a. Male UNDER 65: How often do you have five or more drinks on one occasion? 0 Filed at: 04/11/2024 2306   3b. FEMALE Any Age, or MALE 65+: How often do you have 4 or more drinks on one occassion? 0 Filed at: 04/11/2024 2306   Audit-C Score 0 Filed at: 04/11/2024 2306   ALPESH: How many times in the past year have you...    Used an illegal drug or used a prescription medication for non-medical reasons? Never Filed at: 04/11/2024 2306                  Medical Decision Making  73 year old male with PMHx of diabetes, CVA, MS, DM, neurogenic bladder with suprapubic catheter in place presents to for evaluation of chest pain and abdominal pressure.    Ddx: ACS, PNA, UTI, urinary retention secondary to suprapubic catheter blockage  Will evaluate with troponin, CXR, cbc, cmp, UA, CT abdomen pelvis  UA significant for UTI   CT possible PNA/gastritis and distended bladder with surrounding perivesicular fluid   Suprapubic catheter appears to be blocked, successfully replaced.   Pt started on vanc and levo  Pt admitted for further work up and management     Amount and/or Complexity of Data Reviewed  Labs: ordered. Decision-making details documented in ED Course.  Radiology: ordered.    Risk  Prescription drug management.  Decision regarding hospitalization.          Disposition  Final diagnoses:   Suprapubic catheter dysfunction, initial encounter ([facility])   Atypical chest pain   Esophagitis   UTI (urinary tract infection)     Time reflects when diagnosis was documented in both MDM as  applicable and the Disposition within this note       Time User Action Codes Description Comment    4/12/2024  3:17 AM Check, Jude Add [T83.010A] Suprapubic catheter dysfunction, initial encounter ([facility])     4/12/2024  3:17 AM Check, Jude Add [R07.89] Atypical chest pain     4/12/2024  3:17 AM Check, Jude Add [K20.90] Esophagitis     4/12/2024  3:18 AM Check, Jude Add [N39.0] UTI (urinary tract infection)     4/12/2024  6:36 AM Tuan Josselyn Add [J18.9] HCAP (healthcare-associated pneumonia)           ED Disposition       ED Disposition   Admit    Condition   Stable    Date/Time   Fri Apr 12, 2024 0318    Comment   Case was discussed with valerie and the patient's admission status was agreed to be Admission Status: inpatient status to the service of   .               MD Documentation      Flowsheet Row Most Recent Value   Accepting Facility Name, [facility]   Transported by (Company and Unit #) [facility]          RN Documentation      Flowsheet Row Most Recent Value   Accepting Facility Name, [facility]   Transported by (Company and Unit #) [employer]          Follow-up Information       Follow up With Specialties Details Why Contact Info    Carolina Kumari MD Palliative Care Follow up in 1 week(s)  [address] [facility] [address] [phone]              Discharge Medication List as of 4/15/2024  4:10 PM        START taking these medications    Details   cefpodoxime (VANTIN) 200 mg tablet Take 1 tablet (200 mg total) by mouth 2 (two) times a day for 4 days, Starting Mon 4/15/2024, Until Fri 4/19/2024, No Print           CONTINUE these medications which have NOT CHANGED    Details   baclofen 10 mg tablet Take 5 mg by mouth 3 (three) times a day, Historical Med      budesonide-formoterol (SYMBICORT) 160-4.5 mcg/act inhaler Inhale 2 puffs 2 (two) times a day Rinse mouth after use., Historical Med      Empagliflozin (JARDIANCE) 10 MG TABS tablet Take 10 mg by mouth every  morning, Historical Med      metFORMIN (GLUCOPHAGE) 1000 MG tablet Take 1,000 mg by mouth 2 (two) times a day with meals, Historical Med      mirtazapine (REMERON) 7.5 MG tablet Take 7.5 mg by mouth daily at bedtime, Historical Med      !! acetaminophen (TYLENOL) 325 mg tablet Take 2 tablets (650 mg total) by mouth every 6 (six) hours as needed for mild pain, Starting Fri 3/29/2024, No Print      !! acetaminophen (TYLENOL) 325 mg tablet Take 3 tablets (975 mg total) by mouth every 8 (eight) hours, Starting Tue 4/2/2024, No Print      albuterol (2.5 mg/3 mL) 0.083 % nebulizer solution Take 3 mL (2.5 mg total) by nebulization every 6 (six) hours as needed for wheezing or shortness of breath, Starting Tue 10/3/2023, Normal      amLODIPine-atorvastatin (CADUET) 10-80 MG per tablet Take 1 tablet by mouth daily, Historical Med      bisacodyl (DULCOLAX) 10 mg suppository Insert 10 mg into the rectum daily as needed for constipation, Historical Med      clopidogrel (PLAVIX) 75 mg tablet Take 1 tablet (75 mg total) by mouth daily, Starting Sat 10/27/2018, No Print      cyanocobalamin (VITAMIN B-12) 500 MCG tablet Take 1 tablet (500 mcg total) by mouth daily, Starting Tue 4/2/2024, No Print      Ergocalciferol (VITAMIN D2 PO) Take 50,000 Units by mouth once a week, Historical Med      !! gabapentin (NEURONTIN) 300 mg capsule Take 1 capsule (300 mg total) by mouth 2 (two) times a day, Starting Thu 6/3/2021, Normal      !! gabapentin (NEURONTIN) 300 mg capsule Take 2 capsules (600 mg total) by mouth daily at bedtime, Starting Thu 6/3/2021, Normal      latanoprost (XALATAN) 0.005 % ophthalmic solution Administer 1 drop to both eyes daily at bedtime, Historical Med      lidocaine (LIDODERM) 5 % Apply 1 patch topically over 12 hours daily Remove & Discard patch within 12 hours or as directed by MD, Starting Wed 4/3/2024, No Print      melatonin 3 mg Take 3 mg by mouth daily at bedtime as needed, Historical Med      methenamine  hippurate (HIPREX) 1 g tablet Take 1 tablet (1 g total) by mouth 2 (two) times a day with meals, Starting Wed 3/27/2024, Normal      pantoprazole (PROTONIX) 40 mg tablet Take 40 mg by mouth daily, Historical Med      polyethylene glycol (MIRALAX) 17 g packet Take 17 g by mouth daily, Starting Wed 2/21/2024, No Print      propranolol (INDERAL LA) 60 mg 24 hr capsule Take 60 mg by mouth daily, Historical Med      senna-docusate sodium (SENOKOT S) 8.6-50 mg per tablet Take 2 tablets by mouth daily at bedtime, Historical Med       !! - Potential duplicate medications found. Please discuss with provider.        No discharge procedures on file.    PDMP Review         Value Time User    PDMP Reviewed  Yes 4/1/2024  3:21 AM Ar Beltran DO             ED Provider  Attending physically available and evaluated Mathew Rico. I managed the patient along with the ED Attending.    Electronically Signed by           Eric Santiago DO  04/16/24 2038

## 2024-04-12 NOTE — PLAN OF CARE
Problem: OCCUPATIONAL THERAPY ADULT  Goal: Performs self-care activities at highest level of function for planned discharge setting.  See evaluation for individualized goals.  Description: Treatment Interventions: ADL retraining, Functional transfer training, UE strengthening/ROM, Endurance training, Cognitive reorientation, Patient/family training, Equipment evaluation/education, Compensatory technique education, Continued evaluation, Energy conservation, Activityengagement          See flowsheet documentation for full assessment, interventions and recommendations.   Note: Limitation: Decreased ADL status, Decreased UE ROM, Decreased UE strength, Decreased cognition, Decreased endurance, Decreased self-care trans, Decreased high-level ADLs     Assessment: Pt is a 73 y.o. male seen for OT evaluation s/p admission to [facility] on 4/11/2024. Pt diagnosed with Chest pain. 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[facility]), Bladder infection, Bronchitis, Constipation, Cough, Diabetes ([facility]), Diabetes mellitus ([facility]), Dizziness, Dysfunction of eustachian tube, Erectile dysfunction of non-organic origin, Fatigue, Glaucoma, Hiatal hernia, Hypertension, Imbalance, Leg muscle spasm, MS (multiple sclerosis) ([facility]), Multiple sclerosis ([facility]), Nephrolithiasis, Neurogenic bladder, No natural teeth, Sinus pain, Spinal stenosis, Strain of thoracic region, Stroke ([facility]), and Suprapubic catheter ([facility]). Pt was admitted from [facility]. At baseline, pt living with his brother and sister in a 1 SH w/ 1 DAI. Pt reports requiring A w/ ADLs and IADLs and mod I w/ WC for fxnl mobility at baseline; - driving. Pt also reports SBA for SPT in/out of WC at baseline. Pt agreeable and willing to participate in OT evaluation. During evaluation, pt was S for eating, min A for  grooming tasks, mod A for UB ADLs, and max A for LB ADLs and toileting. Pt also required mod Ax2 for bed mobility and max Ax2 for sit pivot transfer w/ b/l HHA. Performance deficits that affect the pt’s occupational performance during the initial evaluation include decreased ADL status, decreased activity tolerance, decreased endurance, decreased sitting tolerance, decreased sitting balance, decreased standing tolerance, decreased standing balance, decreased transfer skills, decreased fxnl mobility, generalized weakness , decreased UE ROM/strength, and impaired cognition. Based on pt’s functional performance and deficits the following occupations will be addressed in OT treatments in order to maximize pt’s independence and overall occupational performance: grooming, bathing/showering, toileting and toilet hygiene, dressing, and functional mobility. Goals are listed below.  From OT perspective, recommend Level II (Moderate Resource Intensity) upon d/c when pt medically stable to d/c from acute care. Will continue to follow.     Rehab Resource Intensity Level, OT: II (Moderate Resource Intensity)

## 2024-04-12 NOTE — ASSESSMENT & PLAN NOTE
Secondary to history of urinary retention  Pt reports was just changed 2 days ago Appears to be draining

## 2024-04-12 NOTE — H&P
"[facility]  H&P  Name: Mathew Rico 73 y.o. male I MRN: [id number]  Unit/Bed#: [facility] 919-01 I Date of Admission: 4/11/2024   Date of Service: 4/12/2024 I Hospital Day: 0      Assessment/Plan   * Chest pain  Assessment & Plan  Possibly secondary to pna; s/p CT chest  Serial trop neg  S/p EKG no acute ST changes    Leukocytosis  Assessment & Plan  Likely secondary to pna, +/- acute cystitis  Pt did not meet sir criteria  S/p CT chest with b/l lower lobes opacities  S/p CT a/p: with distended urinary bladder with surrounding perivesicular fluid   S/p UA with pyuria though positive chronic indwelling SPC thus would expect chronic colonization  Will place on cefepime/vancomycin  Check urine legionella  Check MRSA nares, if neg consider de-escalation of abx to cefepime  F/u urine cx  F/u blood cx, unfortunately was not obtained prior to abx administration  Cont to trend wbc    Type 2 diabetes mellitus with diabetic polyneuropathy, without long-term current use of insulin (HCC)  Assessment & Plan  Lab Results   Component Value Date    HGBA1C 6.4 (H) 03/26/2024       No results for input(s): \"POCGLU\" in the last 72 hours.    Blood Sugar Average: Last 72 hrs:  Chronically on metformin, jardiance  ISS while hospitalized  Cont gabapentin      Obstructive sleep apnea  Assessment & Plan  On CPAP    Multiple sclerosis (HCC)  Assessment & Plan  Cont baclofen    Primary hypertension  Assessment & Plan  Stable, cont meds    Chronic suprapubic catheter (HCC)  Assessment & Plan  Secondary to history of urinary retention  Pt reports was just changed 2 days ago Appears to be draining           VTE Prophylaxis: Heparin  / sequential compression device   Code Status: Full code  POLST: There is no POLST form on file for this patient (pre-hospital)  Discussion with family: Plan of care d/w patient    Anticipated Length of Stay:  Patient will be admitted on an Inpatient basis with an anticipated " length of stay of  > 2 midnights.   Justification for Hospital Stay: PNa    Total Time for Visit, including Counseling / Coordination of Care: 60 minutes.  Greater than 50% of this total time spent on direct patient counseling and coordination of care.    Chief Complaint:   Chest, abd pain x 1 day    History of Present Illness:    Mathew Rico is a 73 y.o. male medical hx significant for multiple sclerosis, HTN, CVA presents with chest pain and abdominal discomfort x 1 day. He reports of chest pain in the L sternal border, occurring intermittently. He denies radiation of pain. He also reports of non productive cough. He denies fever, chills. He also reports of upper abd discomfort. He was recently discharged on 4/2 where he presented with fall.    Upon presentation to the ED, CT c/a/p was done revealing b/l pna. S/p UA also revealing significant pyuria. He was given levaquin and vancomycin.     Review of Systems:    Review of Systems   Respiratory:  Positive for cough.    Cardiovascular:  Positive for chest pain.   Gastrointestinal:  Positive for abdominal pain.       Past Medical and Surgical History:     Past Medical History:   Diagnosis Date    Acute laryngitis     Acute nonsuppurative otitis media, unspecified laterality     Arm weakness     Arthritis     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HCC)     Bladder infection     Bronchitis     Constipation     Cough     Diabetes (HCC)     Diabetes mellitus (HCC)     Dizziness     Dysfunction of eustachian tube     Erectile dysfunction of non-organic origin     Fatigue     Glaucoma     Hiatal hernia     Hypertension     Imbalance     Leg muscle spasm     MS (multiple sclerosis) (HCC)     Multiple sclerosis (HCC)     Nephrolithiasis     Neurogenic bladder     No natural teeth     Sinus pain     Spinal stenosis     Strain of thoracic region     Stroke ([facility])     Suprapubic catheter ([facility])        Past Surgical History:   Procedure Laterality Date    APPENDECTOMY      BRAIN SURGERY       Coil placed in aneurysm    CEREBRAL ANEURYSM REPAIR      CYSTOSCOPY      CYSTOSCOPY      CYSTOSCOPY  06/11/2018    CYSTOSCOPY  01/15/2021    EYE SURGERY      transscleral cyclophotocoagulation noncontact YAG laser    IR SUPRAPUBIC CATHETER CHECK/CHANGE/REINSERTION/UPSIZE  3/28/2024    MYRINGOTOMY      with ventilation tube insertion    UT LITHOLAPAXY SMPL/SM <2.5 CM N/A 5/7/2019    Procedure: CYSTOSCOPY, holmium laser litholapaxy of bladder stones, EXCHANGE OF SP TUBE;  Surgeon: Javy Jeffries MD;  Location: [facility];  Service: Urology    SUPRAPUBIC CATHETER INSERTION         Meds/Allergies:    Prior to Admission medications    Medication Sig Start Date End Date Taking? Authorizing Provider   baclofen 10 mg tablet Take 5 mg by mouth 3 (three) times a day   Yes Historical Provider, MD   budesonide-formoterol (SYMBICORT) 160-4.5 mcg/act inhaler Inhale 2 puffs 2 (two) times a day Rinse mouth after use.   Yes Historical Provider, MD   Empagliflozin (JARDIANCE) 10 MG TABS tablet Take 10 mg by mouth every morning   Yes Historical Provider, MD   metFORMIN (GLUCOPHAGE) 1000 MG tablet Take 1,000 mg by mouth 2 (two) times a day with meals   Yes Historical Provider, MD   mirtazapine (REMERON) 7.5 MG tablet Take 7.5 mg by mouth daily at bedtime   Yes Historical Provider, MD   acetaminophen (TYLENOL) 325 mg tablet Take 2 tablets (650 mg total) by mouth every 6 (six) hours as needed for mild pain 3/29/24   Cristin Booth MD   acetaminophen (TYLENOL) 325 mg tablet Take 3 tablets (975 mg total) by mouth every 8 (eight) hours 4/2/24   SHA Sutton   albuterol (2.5 mg/3 mL) 0.083 % nebulizer solution Take 3 mL (2.5 mg total) by nebulization every 6 (six) hours as needed for wheezing or shortness of breath 10/3/23   Nelson Cabezas MD   amLODIPine-atorvastatin (CADUET) 10-80 MG per tablet Take 1 tablet by mouth daily    Historical Provider, MD   bisacodyl (DULCOLAX) 10 mg suppository Insert 10 mg into the rectum  daily as needed for constipation    Historical Provider, MD   clopidogrel (PLAVIX) 75 mg tablet Take 1 tablet (75 mg total) by mouth daily 10/27/18   Kraig Griffin MD   cyanocobalamin (VITAMIN B-12) 500 MCG tablet Take 1 tablet (500 mcg total) by mouth daily 4/2/24   SHA Sutton   Ergocalciferol (VITAMIN D2 PO) Take 50,000 Units by mouth once a week    Historical Provider, MD   gabapentin (NEURONTIN) 300 mg capsule Take 1 capsule (300 mg total) by mouth 2 (two) times a day 6/3/21   Phan Vazquez,    gabapentin (NEURONTIN) 300 mg capsule Take 2 capsules (600 mg total) by mouth daily at bedtime 6/3/21   Phan Vazquez,    latanoprost (XALATAN) 0.005 % ophthalmic solution Administer 1 drop to both eyes daily at bedtime    Historical Provider, MD   lidocaine (LIDODERM) 5 % Apply 1 patch topically over 12 hours daily Remove & Discard patch within 12 hours or as directed by MD 4/3/24   SHA Sutton   melatonin 3 mg Take 3 mg by mouth daily at bedtime as needed    Historical Provider, MD   methenamine hippurate (HIPREX) 1 g tablet Take 1 tablet (1 g total) by mouth 2 (two) times a day with meals 3/27/24   Brianna Ramirez PA-C   pantoprazole (PROTONIX) 40 mg tablet Take 40 mg by mouth daily    Historical Provider, MD   polyethylene glycol (MIRALAX) 17 g packet Take 17 g by mouth daily 2/21/24   Mahad Perea MD   propranolol (INDERAL LA) 60 mg 24 hr capsule Take 60 mg by mouth daily    Historical Provider, MD   senna-docusate sodium (SENOKOT S) 8.6-50 mg per tablet Take 2 tablets by mouth daily at bedtime    Historical Provider, MD   ALPRAZolam (XANAX) 0.25 mg tablet Take 0.25 mg by mouth 2 (two) times a day as needed for anxiety or sleep   4/12/24  Historical Provider, MD   amLODIPine-atorvastatin (CADUET) 10-80 MG per tablet Take 1 tablet by mouth daily  4/12/24  Historical Provider, MD   bisacodyl (DULCOLAX) 10 mg suppository Insert 1 suppository (10 mg total) into the rectum daily  as needed for constipation 2/20/24 4/12/24  Mahad Perea MD   clopidogrel (PLAVIX) 75 mg tablet Take 75 mg by mouth daily  4/12/24  Historical Provider, MD   dulaglutide (Trulicity) 0.75 MG/0.5ML injection Inject 0.75 mg under the skin every 7 days  4/12/24  Historical Provider, MD   Dulaglutide (Trulicity) 0.75 MG/0.5ML SOPN Inject 0.75 mg under the skin once a week  4/12/24  Historical Provider, MD   gabapentin (NEURONTIN) 300 mg capsule Take 300 mg by mouth 2 (two) times a day  4/12/24  Historical Provider, MD   gabapentin (NEURONTIN) 300 mg capsule Take 600 mg by mouth daily at bedtime  4/12/24  Historical Provider, MD   latanoprost (XALATAN) 0.005 % ophthalmic solution Administer 1 drop to both eyes daily at bedtime  4/12/24  Historical Provider, MD   pantoprazole (PROTONIX) 40 mg tablet TAKE 1 TABLET TWICE DAILY 30 MINUTES BEFORE BREAKFAST AND DINNER. 3/13/18 4/12/24  Antoni Parrish DO   potassium citrate (UROCIT-K) 10 mEq Take 2 tablets (20 mEq total) by mouth in the morning 3/30/24 4/12/24  Cristin Booth MD   propranolol (INDERAL LA) 60 mg 24 hr capsule Take 60 mg by mouth daily  4/12/24  Historical Provider, MD   sertraline (ZOLOFT) 25 mg tablet Take 25 mg by mouth daily at bedtime  4/12/24  Historical Provider, MD   sertraline (ZOLOFT) 25 mg tablet Take 25 mg by mouth daily  4/12/24  Historical Provider, MD     I have reveiwed home medications using records provided by [facility].    Allergies:   Allergies   Allergen Reactions    Cephalexin Rash    Cephalexin Rash       Social History:     Marital Status: Single   Occupation:   Patient Pre-hospital Living Situation: Presently at [facility]  Patient Pre-hospital Level of Mobility: bedbound  Patient Pre-hospital Diet Restrictions: none  Substance Use History:   Social History     Substance and Sexual Activity   Alcohol Use Not Currently     Social History     Tobacco Use   Smoking Status Former    Current packs/day: 0.00    Average packs/day: 0.5 packs/day  for 31.0 years (15.5 ttl pk-yrs)    Types: Cigarettes    Start date:     Quit date:     Years since quittin.2   Smokeless Tobacco Never     Social History     Substance and Sexual Activity   Drug Use No       Family History:    Family History   Problem Relation Age of Onset    Heart attack Mother     Stroke Mother     Heart attack Father     Anuerysm Father         In Stomach     Aneurysm Father        Physical Exam:     Vitals:   Blood Pressure: 125/65 (24 2303)  Pulse: 70 (24 0330)  Temperature: 98 °F (36.7 °C) (24 2303)  Temp Source: Oral (24 2303)  Respirations: 18 (24 0330)  SpO2: 95 % (24 0330)    Physical Exam  Cardiovascular:      Rate and Rhythm: Normal rate and regular rhythm.      Pulses: Normal pulses.      Heart sounds: Normal heart sounds.   Pulmonary:      Effort: Pulmonary effort is normal. No respiratory distress.      Breath sounds: No wheezing or rales.      Comments: Diminished bs bibasilar  Abdominal:      General: Abdomen is flat. Bowel sounds are normal. There is no distension.      Palpations: Abdomen is soft.      Tenderness: There is no abdominal tenderness. There is no guarding.      Comments: Suprapubic catheter in place   Musculoskeletal:      Cervical back: Normal range of motion and neck supple.      Right lower leg: No edema.      Left lower leg: No edema.   Skin:     General: Skin is warm and dry.   Neurological:      General: No focal deficit present.      Mental Status: He is alert. Mental status is at baseline.      Motor: Weakness present.      Comments: Chronic symmetric weakness         Additional Data:     Lab Results: I have personally reviewed pertinent reports.      Results from last 7 days   Lab Units 24  0025   WBC Thousand/uL 20.36*   HEMOGLOBIN g/dL 14.7   HEMATOCRIT % 44.7   PLATELETS Thousands/uL 364   SEGS PCT % 71   LYMPHO PCT % 16   MONO PCT % 6   EOS PCT % 5     Results from last 7 days   Lab Units  04/12/24  0025   SODIUM mmol/L 137   POTASSIUM mmol/L 4.3   CHLORIDE mmol/L 105   CO2 mmol/L 20*   BUN mg/dL 24   CREATININE mg/dL 0.83   ANION GAP mmol/L 12   CALCIUM mg/dL 9.5   GLUCOSE RANDOM mg/dL 111                 Results from last 7 days   Lab Units 04/12/24  0025   PROCALCITONIN ng/ml 0.07       Imaging: I have personally reviewed pertinent reports.      CT chest abdomen pelvis w contrast   Final Result by Jan Zavala MD (04/12 0310)      1.  Trace mild nodular opacities within bilateral lower lobes may be due to atypical infection or inflammatory.   2.  Thickening of the esophagus/stomach compatible with esophagitis/gastritis.   3.  Markedly distended urinary bladder with surrounding perivesicular fluid, correlate for suprapubic catheter malfunction and urinalysis for superimposed cystitis.      The study was marked in EPIC for immediate notification.         Workstation performed: [id number]             EKG, Pathology, and Other Studies Reviewed on Admission:   EKG: NSR @ 85 bpm, nml axis    Allscripts / Epic Records Reviewed: Yes     ** Please Note: This note has been constructed using a voice recognition system. **

## 2024-04-12 NOTE — RESPIRATORY THERAPY NOTE
RT Protocol Note  Mathew Rico 73 y.o. male MRN: [id number]  [facility] Encounter: [id number]    Assessment    Principal Problem:    Chest pain  Active Problems:    Chronic suprapubic catheter (HCC)    Primary hypertension    Multiple sclerosis (HCC)    Obstructive sleep apnea    Type 2 diabetes mellitus with diabetic polyneuropathy, without long-term current use of insulin (HCC)    Leukocytosis      Home Pulmonary Medications:  albuterol  Home Devices/Therapy: BiPAP/CPAP    Past Medical History:   Diagnosis Date    Acute laryngitis     Acute nonsuppurative otitis media, unspecified laterality     Arm weakness     Arthritis     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HCC)     Bladder infection     Bronchitis     Constipation     Cough     Diabetes (HCC)     Diabetes mellitus (HCC)     Dizziness     Dysfunction of eustachian tube     Erectile dysfunction of non-organic origin     Fatigue     Glaucoma     Hiatal hernia     Hypertension     Imbalance     Leg muscle spasm     MS (multiple sclerosis) (HCC)     Multiple sclerosis (HCC)     Nephrolithiasis     Neurogenic bladder     No natural teeth     Sinus pain     Spinal stenosis     Strain of thoracic region     Stroke (HCC)     Suprapubic catheter (HCC)      Social History     Socioeconomic History    Marital status: Single     Spouse name: None    Number of children: None    Years of education: None    Highest education level: None   Occupational History    Occupation:    retired   Tobacco Use    Smoking status: Former     Current packs/day: 0.00     Average packs/day: 0.5 packs/day for 31.0 years (15.5 ttl pk-yrs)     Types: Cigarettes     Start date:      Quit date:      Years since quittin.2    Smokeless tobacco: Never   Vaping Use    Vaping status: Never Used   Substance and Sexual Activity    Alcohol use: Not Currently    Drug use: No    Sexual activity: Never   Other Topics Concern    None   Social History Narrative    ** Merged  History Encounter **          Social Determinants of Health     Financial Resource Strain: Not on file   Food Insecurity: No Food Insecurity (4/1/2024)    Hunger Vital Sign     Worried About Running Out of Food in the Last Year: Never true     Ran Out of Food in the Last Year: Never true   Transportation Needs: No Transportation Needs (4/1/2024)    PRAPARE - Transportation     Lack of Transportation (Medical): No     Lack of Transportation (Non-Medical): No   Physical Activity: Not on file   Stress: Not on file   Social Connections: Not on file   Intimate Partner Violence: Not on file   Housing Stability: Low Risk  (4/1/2024)    Housing Stability Vital Sign     Unable to Pay for Housing in the Last Year: No     Number of Places Lived in the Last Year: 1     Unstable Housing in the Last Year: No       Subjective         Objective    Physical Exam:   Assessment Type: Assess only  General Appearance: Awake  Respiratory Pattern: Normal  Chest Assessment: Chest expansion symmetrical  Bilateral Breath Sounds: Diminished, Clear    Vitals:  Blood pressure 120/56, pulse 71, temperature (!) 97.4 °F (36.3 °C), resp. rate 18, SpO2 94%.          Imaging and other studies: I have personally reviewed pertinent reports.            Plan    Respiratory Plan: Home Bronchodilator Patient pathway        Resp Comments: pt assessed at this time for respiratory protocol . pt has hx of [place] , pt states he does not wear a cpap.   pt has prn udn  will continue with order as prn. pt admitted for chest pain.

## 2024-04-13 PROBLEM — T83.510A URINARY TRACT INFECTION ASSOCIATED WITH CYSTOSTOMY CATHETER  (HCC): Status: ACTIVE | Noted: 2024-02-15

## 2024-04-13 LAB
ANION GAP SERPL CALCULATED.3IONS-SCNC: 7 MMOL/L (ref 4–13)
BASOPHILS # BLD AUTO: 0.16 THOUSANDS/ÂΜL (ref 0–0.1)
BASOPHILS NFR BLD AUTO: 1 % (ref 0–1)
BUN SERPL-MCNC: 17 MG/DL (ref 5–25)
CALCIUM SERPL-MCNC: 9.5 MG/DL (ref 8.4–10.2)
CHLORIDE SERPL-SCNC: 105 MMOL/L (ref 96–108)
CO2 SERPL-SCNC: 25 MMOL/L (ref 21–32)
CREAT SERPL-MCNC: 0.78 MG/DL (ref 0.6–1.3)
EOSINOPHIL # BLD AUTO: 0.87 THOUSAND/ÂΜL (ref 0–0.61)
EOSINOPHIL NFR BLD AUTO: 7 % (ref 0–6)
ERYTHROCYTE [DISTWIDTH] IN BLOOD BY AUTOMATED COUNT: 14.6 % (ref 11.6–15.1)
GFR SERPL CREATININE-BSD FRML MDRD: 88 ML/MIN/1.73SQ M
GLUCOSE SERPL-MCNC: 117 MG/DL (ref 65–140)
GLUCOSE SERPL-MCNC: 137 MG/DL (ref 65–140)
GLUCOSE SERPL-MCNC: 144 MG/DL (ref 65–140)
GLUCOSE SERPL-MCNC: 203 MG/DL (ref 65–140)
GLUCOSE SERPL-MCNC: 208 MG/DL (ref 65–140)
HCT VFR BLD AUTO: 41 % (ref 36.5–49.3)
HGB BLD-MCNC: 13.2 G/DL (ref 12–17)
IMM GRANULOCYTES # BLD AUTO: 0.04 THOUSAND/UL (ref 0–0.2)
IMM GRANULOCYTES NFR BLD AUTO: 0 % (ref 0–2)
LYMPHOCYTES # BLD AUTO: 3.9 THOUSANDS/ÂΜL (ref 0.6–4.47)
LYMPHOCYTES NFR BLD AUTO: 30 % (ref 14–44)
MCH RBC QN AUTO: 29.9 PG (ref 26.8–34.3)
MCHC RBC AUTO-ENTMCNC: 32.2 G/DL (ref 31.4–37.4)
MCV RBC AUTO: 93 FL (ref 82–98)
MONOCYTES # BLD AUTO: 1.01 THOUSAND/ÂΜL (ref 0.17–1.22)
MONOCYTES NFR BLD AUTO: 8 % (ref 4–12)
MRSA NOSE QL CULT: NORMAL
NEUTROPHILS # BLD AUTO: 7.12 THOUSANDS/ÂΜL (ref 1.85–7.62)
NEUTS SEG NFR BLD AUTO: 54 % (ref 43–75)
NRBC BLD AUTO-RTO: 0 /100 WBCS
PLATELET # BLD AUTO: 337 THOUSANDS/UL (ref 149–390)
PMV BLD AUTO: 9.2 FL (ref 8.9–12.7)
POTASSIUM SERPL-SCNC: 3.8 MMOL/L (ref 3.5–5.3)
RBC # BLD AUTO: 4.41 MILLION/UL (ref 3.88–5.62)
SODIUM SERPL-SCNC: 137 MMOL/L (ref 135–147)
WBC # BLD AUTO: 13.1 THOUSAND/UL (ref 4.31–10.16)

## 2024-04-13 PROCEDURE — 85025 COMPLETE CBC W/AUTO DIFF WBC: CPT | Performed by: INTERNAL MEDICINE

## 2024-04-13 PROCEDURE — 80048 BASIC METABOLIC PNL TOTAL CA: CPT | Performed by: INTERNAL MEDICINE

## 2024-04-13 PROCEDURE — 94760 N-INVAS EAR/PLS OXIMETRY 1: CPT

## 2024-04-13 PROCEDURE — 99232 SBSQ HOSP IP/OBS MODERATE 35: CPT | Performed by: INTERNAL MEDICINE

## 2024-04-13 PROCEDURE — 82948 REAGENT STRIP/BLOOD GLUCOSE: CPT

## 2024-04-13 RX ADMIN — BUDESONIDE AND FORMOTEROL FUMARATE DIHYDRATE 2 PUFF: 160; 4.5 AEROSOL RESPIRATORY (INHALATION) at 09:16

## 2024-04-13 RX ADMIN — INSULIN LISPRO 2 UNITS: 100 INJECTION, SOLUTION INTRAVENOUS; SUBCUTANEOUS at 22:34

## 2024-04-13 RX ADMIN — GABAPENTIN 600 MG: 300 CAPSULE ORAL at 22:31

## 2024-04-13 RX ADMIN — LATANOPROST 1 DROP: 50 SOLUTION OPHTHALMIC at 22:34

## 2024-04-13 RX ADMIN — CYANOCOBALAMIN TAB 500 MCG 500 MCG: 500 TAB at 09:15

## 2024-04-13 RX ADMIN — MIRTAZAPINE 7.5 MG: 15 TABLET, FILM COATED ORAL at 22:32

## 2024-04-13 RX ADMIN — BACLOFEN 5 MG: 10 TABLET ORAL at 09:16

## 2024-04-13 RX ADMIN — METHENAMINE HIPPURATE 1 G: 1 TABLET ORAL at 16:54

## 2024-04-13 RX ADMIN — PROPRANOLOL HYDROCHLORIDE 60 MG: 60 CAPSULE, EXTENDED RELEASE ORAL at 09:15

## 2024-04-13 RX ADMIN — CEFEPIME 1000 MG: 1 INJECTION, POWDER, FOR SOLUTION INTRAMUSCULAR; INTRAVENOUS at 15:19

## 2024-04-13 RX ADMIN — METHENAMINE HIPPURATE 1 G: 1 TABLET ORAL at 09:15

## 2024-04-13 RX ADMIN — MICONAZOLE NITRATE: 20 CREAM TOPICAL at 17:00

## 2024-04-13 RX ADMIN — CLOPIDOGREL BISULFATE 75 MG: 75 TABLET ORAL at 09:16

## 2024-04-13 RX ADMIN — INSULIN LISPRO 4 UNITS: 100 INJECTION, SOLUTION INTRAVENOUS; SUBCUTANEOUS at 11:34

## 2024-04-13 RX ADMIN — GABAPENTIN 300 MG: 300 CAPSULE ORAL at 13:34

## 2024-04-13 RX ADMIN — BACLOFEN 5 MG: 10 TABLET ORAL at 15:19

## 2024-04-13 RX ADMIN — HEPARIN SODIUM 5000 UNITS: 5000 INJECTION INTRAVENOUS; SUBCUTANEOUS at 13:34

## 2024-04-13 RX ADMIN — BUDESONIDE AND FORMOTEROL FUMARATE DIHYDRATE 2 PUFF: 160; 4.5 AEROSOL RESPIRATORY (INHALATION) at 17:00

## 2024-04-13 RX ADMIN — GABAPENTIN 300 MG: 300 CAPSULE ORAL at 09:16

## 2024-04-13 RX ADMIN — CEFEPIME 1000 MG: 1 INJECTION, POWDER, FOR SOLUTION INTRAMUSCULAR; INTRAVENOUS at 03:56

## 2024-04-13 RX ADMIN — HEPARIN SODIUM 5000 UNITS: 5000 INJECTION INTRAVENOUS; SUBCUTANEOUS at 22:32

## 2024-04-13 RX ADMIN — PANTOPRAZOLE SODIUM 40 MG: 40 TABLET, DELAYED RELEASE ORAL at 09:16

## 2024-04-13 RX ADMIN — ATORVASTATIN CALCIUM 80 MG: 80 TABLET, FILM COATED ORAL at 09:15

## 2024-04-13 RX ADMIN — HEPARIN SODIUM 5000 UNITS: 5000 INJECTION INTRAVENOUS; SUBCUTANEOUS at 05:45

## 2024-04-13 RX ADMIN — BACLOFEN 5 MG: 10 TABLET ORAL at 22:30

## 2024-04-13 RX ADMIN — AMLODIPINE BESYLATE 10 MG: 10 TABLET ORAL at 09:15

## 2024-04-13 RX ADMIN — MICONAZOLE NITRATE: 20 CREAM TOPICAL at 09:25

## 2024-04-13 NOTE — PROGRESS NOTES
[facility]  Progress Note  Name: Mathew Rico I  MRN: [id number]  Unit/Bed#: PPHP 919-01 I Date of Admission: 4/11/2024   Date of Service: 4/13/2024 I Hospital Day: 1      Assessment & Plan:    * Chest pain  Assessment & Plan  Initially presumed secondary to possible bilateral pneumonia, however, after discussion with ID stewardship provider with review of imaging, nodular opacities in the lung more possibly inflammatory rather than infectious -> already on empiric IV antibiotics (as below) for UTI  Serial troponins negative  EKG without significant ST-T wave changes, and essentially unchanged from prior (per cardiology read)  Symptoms resolving    Urinary tract infection associated with cystostomy catheter  (HCC)  Assessment & Plan  As suspected on CT imaging with urinalysis revealing large pyuria and moderate bacteriuria  C/w empiric IV Cefepime pending urine culture    Multiple sclerosis (HCC)  Assessment & Plan  Continue Baclofen  S/p suprapubic catheter for associated neurogenic bladder  Outpatient neurology follow-up    Obstructive sleep apnea  Assessment & Plan  CPAP QHS    Neurogenic bladder  Assessment & Plan  In the setting of known multiple sclerosis  Status post suprapubic catheter - patient reports last exchange approximately 2 days prior to admission    Primary hypertension  Assessment & Plan  Continue Norvasc/Inderal    Type 2 diabetes mellitus with neuropathy ([facility])  Assessment & Plan  Lab Results   Component Value Date    HGBA1C 6.4 (H) 03/26/2024     Hold oral hypoglycemics while hospitalized  Continue SSI coverage per Accu-Cheks  Carbohydrate restriction  Hypoglycemia protocol  On Gabapentin for neuropathy    Leukocytosis  Assessment & Plan  Likely reactive secondary to infection  Monitor WBC count -> improving      DVT Prophylaxis:  Heparin SC    AM-PAC Basic Mobility:  Basic Mobility Inpatient Raw Score: 7  JH-HLM Achieved: 2: Bed activities/Dependent  transfer  [facility] Goal: 2: Bed activities/Dependent transfer    Patient Centered Rounds:  I have performed bedside rounds and discussed plan of care with nursing today.  Discussions with Specialists or Other Care Team Provider:  see above assessments if applicable    Education and Discussions with Family / Patient:  At bedside    Time Spent for Care:  35 minutes. More than 50% of total time spent on counseling and coordination of care, on one or more of the following: performing physical exam; counseling and coordination of care, obtaining or reviewing history, documenting in the medical record, reviewing/ordering tests/medications/procedures, and communicating with other healthcare professionals.    Current Length of Stay: 1 day(s)  Current Patient Status: Inpatient   Certification Statement:  Patient will continue to require additional hospital stay due to assessments as noted above.    Code Status: Level 1 - Full Code        Subjective:     Encountered earlier in the day.  Sitting upright reclined in bed.  Denies chest discomfort at this time.  States his weakness and fatigue have improved over the last day.        Objective:     Vitals:   Temp (24hrs), Av.9 °F (36.6 °C), Min:97.5 °F (36.4 °C), Max:98.3 °F (36.8 °C)    Temp:  [97.5 °F (36.4 °C)-98.3 °F (36.8 °C)] 98 °F (36.7 °C)  HR:  [61-70] 67  Resp:  [16-20] 17  BP: (109-136)/(43-58) 109/43  SpO2:  [95 %-97 %] 97 %  There is no height or weight on file to calculate BMI.     Input and Output Summary (last 24 hours):       Intake/Output Summary (Last 24 hours) at 2024 1522  Last data filed at 2024 1258  Gross per 24 hour   Intake 680 ml   Output 1950 ml   Net -1270 ml       Physical Exam:     GENERAL Waxing/waning weakness/fatigue   HEAD   Normocephalic - atraumatic   EYES Nonicteric   MOUTH   Mucosa moist   NECK   Supple - full range of motion   CARDIAC Rate controlled   PULMONARY    Fairly clear to auscultation currently   ABDOMEN  Nontender/nondistended - suprapubic catheter in place   MUSCULOSKELETAL   Motor strength/range of motion deconditioned   NEUROLOGIC   Alert/oriented at baseline   SKIN   Chronic wrinkles/blemishes    PSYCHIATRIC   Mood/affect stable         Additional Data:     Labs & Recent Cultures:    Results from last 7 days   Lab Units 04/13/24  0545   WBC Thousand/uL 13.10*   HEMOGLOBIN g/dL 13.2   HEMATOCRIT % 41.0   PLATELETS Thousands/uL 337   SEGS PCT % 54   LYMPHO PCT % 30   MONO PCT % 8   EOS PCT % 7*     Results from last 7 days   Lab Units 04/13/24  0545   POTASSIUM mmol/L 3.8   CHLORIDE mmol/L 105   CO2 mmol/L 25   BUN mg/dL 17   CREATININE mg/dL 0.78   CALCIUM mg/dL 9.5         Results from last 7 days   Lab Units 04/13/24  1119 04/13/24  0757 04/12/24  2054 04/12/24  1607 04/12/24  1144 04/12/24  0815   POC GLUCOSE mg/dl 208* 117 142* 71 172* 139         Results from last 7 days   Lab Units 04/12/24  0025   PROCALCITONIN ng/ml 0.07         Results from last 7 days   Lab Units 04/12/24  1107 04/12/24  1007 04/12/24  0400   BLOOD CULTURE   --  No Growth at 24 hrs.  No Growth at 24 hrs.  --    URINE CULTURE   --   --  Culture too young- will reincubate   LEGIONELLA URINARY ANTIGEN  Negative  --   --          Lines/Drains:  Invasive Devices       Peripheral Intravenous Line  Duration             Peripheral IV 04/12/24 Left Antecubital 1 day              Drain  Duration             Suprapubic Catheter -- days    Suprapubic Catheter 20 Fr. 15 days                      Last 24 Hours Medication List:   Current Facility-Administered Medications   Medication Dose Route Frequency Provider Last Rate    acetaminophen  650 mg Oral Q6H PRN Josselyn Dickerson DO      albuterol  2.5 mg Nebulization Q6H PRN Josselyn Dickerson DO      amLODIPine  10 mg Oral Daily Josselyn Dickerson DO      And    atorvastatin  80 mg Oral Daily Josselyn Dickerson DO      baclofen  5 mg Oral TID Josselyn Dickerson DO       budesonide-formoterol  2 puff Inhalation BID Josselynti Dickerson, DO      cefepime  1,000 mg Intravenous Q12H Josselynprema Dickerson, DO 1,000 mg (04/13/24 1519)    clopidogrel  75 mg Oral Daily Josselynti Dickerson, DO      cyanocobalamin  500 mcg Oral Daily Josselynti Dickerson, DO      dextromethorphan-guaiFENesin  10 mL Oral Q4H PRN Josselynti Dickerson, DO      gabapentin  300 mg Oral BID Josselynprema Dickerson, DO      gabapentin  600 mg Oral HS Josselynprema Dickerson, DO      heparin (porcine)  5,000 Units Subcutaneous Q8H CIERA Josselynprema Dickerson, DO      insulin lispro  1-6 Units Subcutaneous HS Josselynprema Dickerson, DO      insulin lispro  2-12 Units Subcutaneous TID AC Josselyn Dickerson, DO      latanoprost  1 drop Both Eyes HS Josselynrpema Dickerson, DO      melatonin  3 mg Oral HS PRN Josselynti Dickerson, DO      methenamine hippurate  1 g Oral BID With Meals Josselynprema Dickerson, DO      mirtazapine  7.5 mg Oral HS Josselynprema Dickerson, DO      AMPARO ANTIFUNGAL   Topical BID Luz Jara MD      pantoprazole  40 mg Oral Daily Josselynprema Dickerson, DO      polyethylene glycol  17 g Oral Daily Josselynprema Dickerson, DO      propranolol  60 mg Oral Daily Josselyn Dickerson, DO      senna-docusate sodium  2 tablet Oral HS Josselynprema Dickerson, DO                LUZ JARA MD   Hospitalist - [facility] Internal [facility]        ** Please Note: This note is constructed using a voice recognition dictation system.  An occasional wrong word/phrase or “sound-a-like” substitution may have been picked up by dictation device due to the inherent limitations of voice recognition software.  Read the chart carefully and recognize, using reasonable context, where substitutions may have occurred.**

## 2024-04-13 NOTE — UTILIZATION REVIEW
Initial Clinical Review    Admission: Date/Time/Statement:   Admission Orders (From admission, onward)       Ordered        04/12/24 0338  INPATIENT ADMISSION  Once                          Orders Placed This Encounter   Procedures    INPATIENT ADMISSION     Standing Status:   Standing     Number of Occurrences:   1     Order Specific Question:   Level of Care     Answer:   Med Surg [16]     Order Specific Question:   Estimated length of stay     Answer:   More than 2 Midnights     Order Specific Question:   Certification     Answer:   I certify that inpatient services are medically necessary for this patient for a duration of greater than two midnights. See H&P and MD Progress Notes for additional information about the patient's course of treatment.     ED Arrival Information       Expected   -    Arrival   4/11/2024 23:00    Acuity   Urgent              Means of arrival   Ambulance    Escorted by   [facility]    Service   Hospitalist    Admission type   Emergency              Arrival complaint   Chest Pain             Chief Complaint   Patient presents with    Chest Pain     Reports CP that started around 2230 just laying in bed watching TV. Reports crushing 10/10 pain. Reports heartburn the last few days        Initial Presentation: 73 y.o. male to ED by ems admitted Inpatient d/t chest pain and pneumonia. PMH multiple sclerosis, HTN, CVA.Serial trop neg. EKG no acute ST changes.S/p CT chest with b/l lower lobes opacities.CT a/p: with distended urinary bladder with surrounding perivesicular fluid.Check urine legionella. UA with pyuria though positive chronic indwelling SPC thus would expect chronic colonization.IV cefepime/vancomycin.     Date: 4/12   Day 2: Chest, abd pain x 1 day. IV cefepime/vancomycin.     Date: 4/13  Day 3: Has surpassed a 2nd midnight with active treatments and services.Denies chest discomfort at this time. States his weakness and fatigue have improved over the last day. IV  cefepime/vancomycin.         ED Triage Vitals [04/11/24 2303]   Temperature Pulse Respirations Blood Pressure SpO2   98 °F (36.7 °C) 86 18 125/65 100 %      Temp Source Heart Rate Source Patient Position - Orthostatic VS BP Location FiO2 (%)   Oral Monitor Lying Right arm --      Pain Score       7          Wt Readings from Last 1 Encounters:   03/31/24 69.3 kg (152 lb 12.5 oz)     Additional Vital Signs:   04/13/24 0932 -- -- -- -- -- -- None (Room air) --   04/13/24 07:57:51 97.5 °F (36.4 °C) 61 20 136/58 84 97 % -- --   04/13/24 0734 -- -- -- -- -- -- None (Room air) --   04/12/24 22:20:23 98.3 °F (36.8 °C) 70 16 121/51 74 95 % None (Room air) --   04/12/24 15:21:57 97.6 °F (36.4 °C) -- 12 117/52 74 -- -- --   04/12/24 08:24:25 97.4 °F (36.3 °C) Abnormal  71 18 120/56 77 94 % -- --   04/12/24 0330 -- 70 18 -- -- 95 % None (Room air) Lying   04/12/24 0130 -- 72 19 -- -- 93 % None (Room air) --   04/11/24 2307 -- -- -- -- -- -- None (Room air) --   04/11/24 2303 98 °F (36.7 °C) 86 18 125/65 89 100 % None (Room air) Lying         Pertinent Labs/Diagnostic Test Results:   4/12 EKG:Normal sinus rhythm  Normal ECG  When compared with ECG of 14-FEB-2024 22:46,  No significant change was found  Confirmed by Igor Tobin ([id number]) on 4/12/2024 12:19:35 PM      CT chest abdomen pelvis w contrast   Final Result by Jan Zavala MD (04/12 0310)      1.  Trace mild nodular opacities within bilateral lower lobes may be due to atypical infection or inflammatory.   2.  Thickening of the esophagus/stomach compatible with esophagitis/gastritis.   3.  Markedly distended urinary bladder with surrounding perivesicular fluid, correlate for suprapubic catheter malfunction and urinalysis for superimposed cystitis.      The study was marked in EPIC for immediate notification.         Workstation performed: [id number]               Results from last 7 days   Lab Units 04/13/24  0545 04/12/24  1006 04/12/24  0025   WBC Thousand/uL 13.10*  15.03* 20.36*   HEMOGLOBIN g/dL 13.2 14.7 14.7   HEMATOCRIT % 41.0 45.5 44.7   PLATELETS Thousands/uL 337 329 364   TOTAL NEUT ABS Thousands/µL 7.12  --  14.58*         Results from last 7 days   Lab Units 04/13/24  0545 04/12/24  1006 04/12/24  0025   SODIUM mmol/L 137 134* 137   POTASSIUM mmol/L 3.8 4.1 4.3   CHLORIDE mmol/L 105 103 105   CO2 mmol/L 25 22 20*   ANION GAP mmol/L 7 9 12   BUN mg/dL 17 20 24   CREATININE mg/dL 0.78 0.75 0.83   EGFR ml/min/1.73sq m 88 90 86   CALCIUM mg/dL 9.5 9.5 9.5         Results from last 7 days   Lab Units 04/13/24  1119 04/13/24  0757 04/12/24  2054 04/12/24  1607 04/12/24  1144 04/12/24  0815   POC GLUCOSE mg/dl 208* 117 142* 71 172* 139     Results from last 7 days   Lab Units 04/13/24  0545 04/12/24  1006 04/12/24  0025   GLUCOSE RANDOM mg/dL 137 137 111       Results from last 7 days   Lab Units 04/12/24  0243 04/12/24  0025   HS TNI 0HR ng/L  --  5   HS TNI 2HR ng/L 5  --    HSTNI D2 ng/L 0  --                  Results from last 7 days   Lab Units 04/12/24  0025   PROCALCITONIN ng/ml 0.07       Results from last 7 days   Lab Units 04/12/24  0025   LIPASE u/L 38       Results from last 7 days   Lab Units 04/12/24  0400   CLARITY UA  Extra Turbid   COLOR UA  Yellow   SPEC GRAV UA  1.048*   PH UA  6.0   GLUCOSE UA mg/dl 500 (1/2%)*   KETONES UA mg/dl Negative   BLOOD UA  Moderate*   PROTEIN UA mg/dl 200 (2+)*   NITRITE UA  Negative   BILIRUBIN UA  Negative   UROBILINOGEN UA (BE) mg/dl <2.0   LEUKOCYTES UA  Large*   WBC UA /hpf Innumerable*   RBC UA /hpf Innumerable*   BACTERIA UA /hpf Moderate*   EPITHELIAL CELLS WET PREP /hpf Occasional     Results from last 7 days   Lab Units 04/12/24  1107   STREP PNEUMONIAE ANTIGEN, URINE  Negative   LEGIONELLA URINARY ANTIGEN  Negative       Results from last 7 days   Lab Units 04/12/24  1007 04/12/24  0400   BLOOD CULTURE  No Growth at 24 hrs.  No Growth at 24 hrs.  --    URINE CULTURE   --  Culture too young- will reincubate       ED  Treatment:   Medication Administration from 04/11/2024 2300 to 04/12/2024 0436         Date/Time Order Dose Route Action Action by Comments     04/12/2024 0138 EDT HYDROmorphone (DILAUDID) injection 0.5 mg 0.5 mg Intravenous Given                  04/12/2024 0357 EDT vancomycin (VANCOCIN) 1,750 mg in sodium chloride 0.9 % 500 mL IVPB 1,750 mg Intravenous New Bag            Past Medical History:   Diagnosis Date    Acute laryngitis     Acute nonsuppurative otitis media, unspecified laterality     Arm weakness     Arthritis     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[facility])     Bladder infection     Bronchitis     Constipation     Cough     Diabetes ([facility])     Diabetes mellitus ([facility])     Dizziness     Dysfunction of eustachian tube     Erectile dysfunction of non-organic origin     Fatigue     Glaucoma     Hiatal hernia     Hypertension     Imbalance     Leg muscle spasm     MS (multiple sclerosis) ([facility])     Multiple sclerosis ([facility])     Nephrolithiasis     Neurogenic bladder     No natural teeth     Sinus pain     Spinal stenosis     Strain of thoracic region     Stroke ([facility])     Suprapubic catheter ([facility])      Present on Admission:   Leukocytosis   Type 2 diabetes mellitus with diabetic polyneuropathy, without long-term current use of insulin ([facility])   Multiple sclerosis ([facility])   Primary hypertension   Obstructive sleep apnea      Admitting Diagnosis: UTI (urinary tract infection) [N39.0]  Chest pain [R07.9]  Esophagitis [K20.90]  Atypical chest pain [R07.89]  Suprapubic catheter dysfunction, initial encounter ([facility]) [T83.010A]  Age/Sex: 73 y.o. male  Admission Orders:  Scheduled Medications:  amLODIPine, 10 mg, Oral, Daily   And  atorvastatin, 80 mg, Oral, Daily  baclofen, 5 mg, Oral, TID  budesonide-formoterol, 2 puff, Inhalation, BID  cefepime, 1,000 mg, Intravenous, Q12H  clopidogrel, 75 mg, Oral, Daily  cyanocobalamin, 500 mcg, Oral, Daily  gabapentin, 300 mg, Oral, BID  gabapentin, 600 mg, Oral, HS  heparin (porcine), 5,000 Units,  Subcutaneous, Q8H CIERA  insulin lispro, 1-6 Units, Subcutaneous, HS  insulin lispro, 2-12 Units, Subcutaneous, TID AC  latanoprost, 1 drop, Both Eyes, HS  methenamine hippurate, 1 g, Oral, BID With Meals  mirtazapine, 7.5 mg, Oral, HS  AMPARO ANTIFUNGAL, , Topical, BID  pantoprazole, 40 mg, Oral, Daily  polyethylene glycol, 17 g, Oral, Daily  propranolol, 60 mg, Oral, Daily  senna-docusate sodium, 2 tablet, Oral, HS        PRN Meds:  acetaminophen, 650 mg, Oral, Q6H PRN 4/12 X 1  albuterol, 2.5 mg, Nebulization, Q6H PRN  dextromethorphan-guaiFENesin, 10 mL, Oral, Q4H PRN  melatonin, 3 mg, Oral, HS PRN    SCD  PT/OT  OOB  CONS CARB DIET          Network Utilization Review Department  ATTENTION: Please call with any questions or concerns to [phone] and carefully listen to the prompts so that you are directed to the right person. All voicemails are confidential.   For Discharge needs, contact Care Management DC Support Team at [phone] opt. [phone]  Send all requests for admission clinical reviews, approved or denied determinations and any other requests to dedicated fax number below belonging to the campus where the patient is receiving treatment. List of dedicated fax numbers for the Facilities:  FACILITY NAME UR FAX NUMBER   ADMISSION DENIALS (Administrative/Medical Necessity) [phone]   DISCHARGE SUPPORT TEAM (NETWORK) [phone]   PARENT CHILD HEALTH (Maternity/NICU/Pediatrics) [phone]   [facility] [phone]   [facility] [phone]   [facility] [phone]   [facility] [phone]   [facility] [phone]   [facility] [phone]   [facility] [phone]   [facility] [phone]   [facility] [phone]   [facility]  [place] [phone]   [facility] [phone]   [place] [phone]

## 2024-04-13 NOTE — ASSESSMENT & PLAN NOTE
In the setting of known multiple sclerosis  Status post suprapubic catheter - patient reports last exchange approximately 2 days prior to admission

## 2024-04-13 NOTE — ASSESSMENT & PLAN NOTE
Lab Results   Component Value Date    HGBA1C 6.4 (H) 03/26/2024     Hold oral hypoglycemics while hospitalized  Continue SSI coverage per Accu-Cheks  Carbohydrate restriction  Hypoglycemia protocol  On Gabapentin for neuropathy

## 2024-04-13 NOTE — PLAN OF CARE
Problem: INFECTION - ADULT  Goal: Absence or prevention of progression during hospitalization  Description: INTERVENTIONS:  - Assess and monitor for signs and symptoms of infection  - Monitor lab/diagnostic results  - Monitor all insertion sites, i.e. indwelling lines, tubes, and drains  - Monitor endotracheal if appropriate and nasal secretions for changes in amount and color  - [place] appropriate cooling/warming therapies per order  - Administer medications as ordered  - Instruct and encourage patient and family to use good hand hygiene technique  - Identify and instruct in appropriate isolation precautions for identified infection/condition  Outcome: Progressing  Goal: Absence of fever/infection during neutropenic period  Description: INTERVENTIONS:  - Monitor WBC    Outcome: Progressing     Problem: Potential for Falls  Goal: Patient will remain free of falls  Description: INTERVENTIONS:  - Educate patient/family on patient safety including physical limitations  - Instruct patient to call for assistance with activity   - Consult OT/PT to assist with strengthening/mobility   - Keep Call bell within reach  - Keep bed low and locked with side rails adjusted as appropriate  - Keep care items and personal belongings within reach  - Initiate and maintain comfort rounds  - Make Fall Risk Sign visible to staff  - Offer Toileting every  Hours, in advance of need  - Initiate/Maintain alarm  - Obtain necessary fall risk management equipment:   - Apply yellow socks and bracelet for high fall risk patients  - Consider moving patient to room near nurses station  Outcome: Progressing

## 2024-04-13 NOTE — ED ATTENDING ATTESTATION
4/11/2024  I, Jude Galarza MD, saw and evaluated the patient. I have discussed the patient with the resident/non-physician practitioner and agree with the resident's/non-physician practitioner's findings, Plan of Care, and MDM as documented in the resident's/non-physician practitioner's note, except where noted. All available labs and Radiology studies were reviewed.  I was present for key portions of any procedure(s) performed by the resident/non-physician practitioner and I was immediately available to provide assistance.       At this point I agree with the current assessment done in the Emergency Department.  I have conducted an independent evaluation of this patient a history and physical is as follows:    73-year-old male with a history of diabetes, neurogenic bladder with suprapubic catheter in place presents to the emergency department for evaluation of chest pain that started while he was watching TV.  He describes it as a severe crushing chest pain.  Does not radiate, is associated with some mild shortness of breath.  Also complains of lower abdominal pain, no nausea or vomiting.  He is unsure whether or not his suprapubic catheter is draining appropriately.    On exam, patient ill-appearing, but in no acute distress, head is normocephalic atraumatic, pupils equal round reactive to light, neck is supple without meningismus signs, heart is regular rate and rhythm with intact distal pulses, no increased work of breathing, respiratory distress, or stridor.  Abdomen is soft with suprapubic abdominal tenderness, no rebound or guarding.    Differential diagnosis includes but is not limited to arrhythmia, ACS, pneumonia, musculoskeletal, acute urinary retention secondary to suprapubic catheter blockage, urinary tract infection.  Plan to check cardiac workup, CT scan of the abdomen and pelvis.    Labs remarkable for leukocytosis, but were otherwise unremarkable.  Urinalysis indicative of infection.  CT scan  shows possible pneumonia, esophagitis/gastritis, and markedly distended urinary bladder with surrounding perivesicular fluid.  Repeat evaluation, it appears that the suprapubic catheter was no longer draining appropriately.  Suprapubic catheter was successfully replaced.  Patient treated with antibiotics for UTI and questionable pneumonia.  Will be admitted to medicine for further management and evaluation.    ED Course  ED Course as of 04/12/24 2221 Fri Apr 12, 2024 0316 EKG interpreted by myself demonstrates normal sinus rhythm, normal axis, normal intervals, when compared to prior EKG, no significant change was found         Critical Care Time  Procedures

## 2024-04-13 NOTE — ASSESSMENT & PLAN NOTE
Continue Baclofen  S/p suprapubic catheter for associated neurogenic bladder  Outpatient neurology follow-up

## 2024-04-13 NOTE — ASSESSMENT & PLAN NOTE
As suspected on CT imaging with urinalysis revealing large pyuria and moderate bacteriuria  C/w empiric IV Cefepime pending urine culture

## 2024-04-13 NOTE — PLAN OF CARE
Problem: Potential for Falls  Goal: Patient will remain free of falls  Description: INTERVENTIONS:  - Educate patient/family on patient safety including physical limitations  - Instruct patient to call for assistance with activity   - Consult OT/PT to assist with strengthening/mobility   - Keep Call bell within reach  - Keep bed low and locked with side rails adjusted as appropriate  - Keep care items and personal belongings within reach  - Initiate and maintain comfort rounds  - Make Fall Risk Sign visible to staff  - Offer Toileting every  Hours, in advance of need  - Initiate/Maintain alarm  - Obtain necessary fall risk management equipment:   - Apply yellow socks and bracelet for high fall risk patients  - Consider moving patient to room near nurses station  Outcome: Progressing     Problem: INFECTION - ADULT  Goal: Absence or prevention of progression during hospitalization  Description: INTERVENTIONS:  - Assess and monitor for signs and symptoms of infection  - Monitor lab/diagnostic results  - Monitor all insertion sites, i.e. indwelling lines, tubes, and drains  - Monitor endotracheal if appropriate and nasal secretions for changes in amount and color  - [place] appropriate cooling/warming therapies per order  - Administer medications as ordered  - Instruct and encourage patient and family to use good hand hygiene technique  - Identify and instruct in appropriate isolation precautions for identified infection/condition  Outcome: Progressing     Problem: SAFETY ADULT  Goal: Patient will remain free of falls  Description: INTERVENTIONS:  - Educate patient/family on patient safety including physical limitations  - Instruct patient to call for assistance with activity   - Consult OT/PT to assist with strengthening/mobility   - Keep Call bell within reach  - Keep bed low and locked with side rails adjusted as appropriate  - Keep care items and personal belongings within reach  - Initiate and maintain comfort  rounds  - Make Fall Risk Sign visible to staff  - Offer Toileting every  Hours, in advance of need  - Initiate/Maintain alarm  - Obtain necessary fall risk management equipment:   - Apply yellow socks and bracelet for high fall risk patients  - Consider moving patient to room near nurses station  Outcome: Progressing

## 2024-04-13 NOTE — ASSESSMENT & PLAN NOTE
Initially presumed secondary to possible bilateral pneumonia, however, after discussion with ID stewardship provider with review of imaging, nodular opacities in the lung more possibly inflammatory rather than infectious -> already on empiric IV antibiotics (as below) for UTI  Serial troponins negative  EKG without significant ST-T wave changes, and essentially unchanged from prior (per cardiology read)  Symptoms resolving

## 2024-04-14 LAB
ANION GAP SERPL CALCULATED.3IONS-SCNC: 9 MMOL/L (ref 4–13)
BASOPHILS # BLD AUTO: 0.12 THOUSANDS/ÂΜL (ref 0–0.1)
BASOPHILS NFR BLD AUTO: 1 % (ref 0–1)
BUN SERPL-MCNC: 13 MG/DL (ref 5–25)
CALCIUM SERPL-MCNC: 8.7 MG/DL (ref 8.4–10.2)
CHLORIDE SERPL-SCNC: 108 MMOL/L (ref 96–108)
CO2 SERPL-SCNC: 23 MMOL/L (ref 21–32)
CREAT SERPL-MCNC: 0.75 MG/DL (ref 0.6–1.3)
EOSINOPHIL # BLD AUTO: 0.79 THOUSAND/ÂΜL (ref 0–0.61)
EOSINOPHIL NFR BLD AUTO: 7 % (ref 0–6)
ERYTHROCYTE [DISTWIDTH] IN BLOOD BY AUTOMATED COUNT: 14.6 % (ref 11.6–15.1)
GFR SERPL CREATININE-BSD FRML MDRD: 90 ML/MIN/1.73SQ M
GLUCOSE SERPL-MCNC: 114 MG/DL (ref 65–140)
GLUCOSE SERPL-MCNC: 135 MG/DL (ref 65–140)
GLUCOSE SERPL-MCNC: 220 MG/DL (ref 65–140)
GLUCOSE SERPL-MCNC: 243 MG/DL (ref 65–140)
HCT VFR BLD AUTO: 37.3 % (ref 36.5–49.3)
HGB BLD-MCNC: 12 G/DL (ref 12–17)
IMM GRANULOCYTES # BLD AUTO: 0.03 THOUSAND/UL (ref 0–0.2)
IMM GRANULOCYTES NFR BLD AUTO: 0 % (ref 0–2)
LYMPHOCYTES # BLD AUTO: 3.81 THOUSANDS/ÂΜL (ref 0.6–4.47)
LYMPHOCYTES NFR BLD AUTO: 34 % (ref 14–44)
MCH RBC QN AUTO: 29.7 PG (ref 26.8–34.3)
MCHC RBC AUTO-ENTMCNC: 32.2 G/DL (ref 31.4–37.4)
MCV RBC AUTO: 92 FL (ref 82–98)
MONOCYTES # BLD AUTO: 0.75 THOUSAND/ÂΜL (ref 0.17–1.22)
MONOCYTES NFR BLD AUTO: 7 % (ref 4–12)
NEUTROPHILS # BLD AUTO: 5.78 THOUSANDS/ÂΜL (ref 1.85–7.62)
NEUTS SEG NFR BLD AUTO: 51 % (ref 43–75)
NRBC BLD AUTO-RTO: 0 /100 WBCS
PLATELET # BLD AUTO: 307 THOUSANDS/UL (ref 149–390)
PMV BLD AUTO: 9 FL (ref 8.9–12.7)
POTASSIUM SERPL-SCNC: 3.8 MMOL/L (ref 3.5–5.3)
RBC # BLD AUTO: 4.04 MILLION/UL (ref 3.88–5.62)
SODIUM SERPL-SCNC: 140 MMOL/L (ref 135–147)
WBC # BLD AUTO: 11.28 THOUSAND/UL (ref 4.31–10.16)

## 2024-04-14 PROCEDURE — 94664 DEMO&/EVAL PT USE INHALER: CPT

## 2024-04-14 PROCEDURE — 82948 REAGENT STRIP/BLOOD GLUCOSE: CPT

## 2024-04-14 PROCEDURE — 99232 SBSQ HOSP IP/OBS MODERATE 35: CPT | Performed by: INTERNAL MEDICINE

## 2024-04-14 PROCEDURE — 85025 COMPLETE CBC W/AUTO DIFF WBC: CPT | Performed by: INTERNAL MEDICINE

## 2024-04-14 PROCEDURE — 80048 BASIC METABOLIC PNL TOTAL CA: CPT | Performed by: INTERNAL MEDICINE

## 2024-04-14 RX ADMIN — CEFEPIME 1000 MG: 1 INJECTION, POWDER, FOR SOLUTION INTRAMUSCULAR; INTRAVENOUS at 03:28

## 2024-04-14 RX ADMIN — BACLOFEN 5 MG: 10 TABLET ORAL at 11:30

## 2024-04-14 RX ADMIN — CEFEPIME 1000 MG: 1 INJECTION, POWDER, FOR SOLUTION INTRAMUSCULAR; INTRAVENOUS at 15:24

## 2024-04-14 RX ADMIN — CYANOCOBALAMIN TAB 500 MCG 500 MCG: 500 TAB at 11:30

## 2024-04-14 RX ADMIN — HEPARIN SODIUM 5000 UNITS: 5000 INJECTION INTRAVENOUS; SUBCUTANEOUS at 21:08

## 2024-04-14 RX ADMIN — AMLODIPINE BESYLATE 10 MG: 10 TABLET ORAL at 11:30

## 2024-04-14 RX ADMIN — MIRTAZAPINE 7.5 MG: 15 TABLET, FILM COATED ORAL at 21:07

## 2024-04-14 RX ADMIN — HEPARIN SODIUM 5000 UNITS: 5000 INJECTION INTRAVENOUS; SUBCUTANEOUS at 13:56

## 2024-04-14 RX ADMIN — PANTOPRAZOLE SODIUM 40 MG: 40 TABLET, DELAYED RELEASE ORAL at 11:29

## 2024-04-14 RX ADMIN — CLOPIDOGREL BISULFATE 75 MG: 75 TABLET ORAL at 11:30

## 2024-04-14 RX ADMIN — MICONAZOLE NITRATE: 20 CREAM TOPICAL at 17:07

## 2024-04-14 RX ADMIN — METHENAMINE HIPPURATE 1 G: 1 TABLET ORAL at 21:08

## 2024-04-14 RX ADMIN — GABAPENTIN 600 MG: 300 CAPSULE ORAL at 21:07

## 2024-04-14 RX ADMIN — BUDESONIDE AND FORMOTEROL FUMARATE DIHYDRATE 2 PUFF: 160; 4.5 AEROSOL RESPIRATORY (INHALATION) at 17:06

## 2024-04-14 RX ADMIN — MICONAZOLE NITRATE: 20 CREAM TOPICAL at 11:30

## 2024-04-14 RX ADMIN — INSULIN LISPRO 4 UNITS: 100 INJECTION, SOLUTION INTRAVENOUS; SUBCUTANEOUS at 11:51

## 2024-04-14 RX ADMIN — GABAPENTIN 300 MG: 300 CAPSULE ORAL at 11:29

## 2024-04-14 RX ADMIN — PROPRANOLOL HYDROCHLORIDE 60 MG: 60 CAPSULE, EXTENDED RELEASE ORAL at 11:30

## 2024-04-14 RX ADMIN — ATORVASTATIN CALCIUM 80 MG: 80 TABLET, FILM COATED ORAL at 11:30

## 2024-04-14 RX ADMIN — BACLOFEN 5 MG: 10 TABLET ORAL at 21:07

## 2024-04-14 RX ADMIN — BUDESONIDE AND FORMOTEROL FUMARATE DIHYDRATE 2 PUFF: 160; 4.5 AEROSOL RESPIRATORY (INHALATION) at 11:29

## 2024-04-14 RX ADMIN — GABAPENTIN 300 MG: 300 CAPSULE ORAL at 15:23

## 2024-04-14 RX ADMIN — LATANOPROST 1 DROP: 50 SOLUTION OPHTHALMIC at 21:09

## 2024-04-14 RX ADMIN — INSULIN LISPRO 2 UNITS: 100 INJECTION, SOLUTION INTRAVENOUS; SUBCUTANEOUS at 21:08

## 2024-04-14 RX ADMIN — BACLOFEN 5 MG: 10 TABLET ORAL at 15:23

## 2024-04-14 RX ADMIN — METHENAMINE HIPPURATE 1 G: 1 TABLET ORAL at 11:30

## 2024-04-14 RX ADMIN — HEPARIN SODIUM 5000 UNITS: 5000 INJECTION INTRAVENOUS; SUBCUTANEOUS at 05:15

## 2024-04-14 NOTE — ASSESSMENT & PLAN NOTE
As suspected on CT imaging with urinalysis revealing large pyuria and moderate bacteriuria  C/w empiric IV Cefepime awaiting final urine culture/sensitivities

## 2024-04-14 NOTE — RESPIRATORY THERAPY NOTE
04/14/24 0710   Respiratory Assessment   Assessment Type Assess only   General Appearance Awake   Respiratory Pattern Normal   Chest Assessment Chest expansion symmetrical   Bilateral Breath Sounds Clear   Resp Comments Bilateral BS clear. NO distress ntoed. No indication for UDN   O2 Device ra   Additional Assessments   Pulse 72   Respirations 16   SpO2 98 %   Position Semi-Munguia's

## 2024-04-14 NOTE — PROGRESS NOTES
[facility]  Progress Note  Name: Mathew Rico I  MRN: [id number]  Unit/Bed#: [facility] 919-01 I Date of Admission: 4/11/2024   Date of Service: 4/14/2024 I Hospital Day: 2      Assessment & Plan:    * Chest pain  Assessment & Plan  Initially presumed secondary to possible bilateral pneumonia, however, after discussion with ID stewardship provider with review of imaging, nodular opacities in the lung more possibly inflammatory rather than infectious -> already on empiric IV antibiotics (as below) for UTI  Serial troponins negative  EKG without significant ST-T wave changes, and essentially unchanged from prior (per cardiology read)  Symptoms resolving  Anticipate discharge tomorrow    Urinary tract infection associated with cystostomy catheter  (HCC)  Assessment & Plan  As suspected on CT imaging with urinalysis revealing large pyuria and moderate bacteriuria  C/w empiric IV Cefepime awaiting final urine culture/sensitivities    Multiple sclerosis ([facility])  Assessment & Plan  Continue Baclofen  S/p suprapubic catheter for associated neurogenic bladder  Outpatient neurology follow-up    Obstructive sleep apnea  Assessment & Plan  CPAP QHS    Neurogenic bladder  Assessment & Plan  In the setting of known multiple sclerosis  Status post suprapubic catheter - patient reports last exchange approximately 2 days prior to admission    Primary hypertension  Assessment & Plan  Continue Norvasc/Inderal    Type 2 diabetes mellitus with neuropathy ([facility])  Assessment & Plan  Lab Results   Component Value Date    HGBA1C 6.4 (H) 03/26/2024     Hold oral hypoglycemics while hospitalized  Continue SSI coverage per Accu-Cheks  Carbohydrate restriction  Hypoglycemia protocol  On Gabapentin for neuropathy    Leukocytosis  Assessment & Plan  Likely reactive secondary to infection  Monitor WBC count -> improving      DVT Prophylaxis:  Heparin SC    AM-PAC Basic Mobility:  Basic Mobility Inpatient Raw  "Score: 7  -HL Achieved: 2: Bed activities/Dependent transfer  -HL Goal: 2: Bed activities/Dependent transfer    Patient Centered Rounds:  I have performed bedside rounds and discussed plan of care with nursing today.  Discussions with Specialists or Other Care Team Provider:  see above assessments if applicable    Education and Discussions with Family / Patient:  Patient at bedside, along with brother, Guzman, over the phone today    Time Spent for Care:  35 minutes. More than 50% of total time spent on counseling and coordination of care, on one or more of the following: performing physical exam; counseling and coordination of care, obtaining or reviewing history, documenting in the medical record, reviewing/ordering tests/medications/procedures, and communicating with other healthcare professionals.    Current Length of Stay: 2 day(s)  Current Patient Status: Inpatient   Certification Statement:  Patient will continue to require additional hospital stay due to assessments as noted above.    Code Status: Level 1 - Full Code        Subjective:     No new complaints at this time.  States he feels \"better\".  Denies pain or fever/chills currently.        Objective:     Vitals:   Temp (24hrs), Av.2 °F (36.8 °C), Min:98.1 °F (36.7 °C), Max:98.2 °F (36.8 °C)    Temp:  [98.1 °F (36.7 °C)-98.2 °F (36.8 °C)] 98.2 °F (36.8 °C)  HR:  [66-74] 66  Resp:  [16-18] 18  BP: (109-113)/(43-45) 112/45  SpO2:  [95 %-98 %] 97 %  There is no height or weight on file to calculate BMI.     Input and Output Summary (last 24 hours):       Intake/Output Summary (Last 24 hours) at 2024 1524  Last data filed at 2024 1401  Gross per 24 hour   Intake 360 ml   Output 1425 ml   Net -1065 ml       Physical Exam:     GENERAL Waxing/waning but improved weakness/fatigue   HEAD   Normocephalic - atraumatic   EYES Nonicteric   MOUTH   Mucosa moist   NECK   Supple - full range of motion   CARDIAC Rate controlled   PULMONARY Clear " bilateral breath sounds with nonlabored respirations   ABDOMEN Nontender/nondistended - suprapubic catheter in place   MUSCULOSKELETAL   Motor strength/range of motion deconditioned   NEUROLOGIC   Alert/oriented at baseline   SKIN   Chronic wrinkles/blemishes    PSYCHIATRIC   Mood/affect stable         Additional Data:     Labs & Recent Cultures:    Results from last 7 days   Lab Units 04/14/24  0514   WBC Thousand/uL 11.28*   HEMOGLOBIN g/dL 12.0   HEMATOCRIT % 37.3   PLATELETS Thousands/uL 307   SEGS PCT % 51   LYMPHO PCT % 34   MONO PCT % 7   EOS PCT % 7*     Results from last 7 days   Lab Units 04/14/24  0514   POTASSIUM mmol/L 3.8   CHLORIDE mmol/L 108   CO2 mmol/L 23   BUN mg/dL 13   CREATININE mg/dL 0.75   CALCIUM mg/dL 8.7         Results from last 7 days   Lab Units 04/14/24  1120 04/13/24  2027 04/13/24  1617 04/13/24  1119 04/13/24  0757 04/12/24  2054 04/12/24  1607 04/12/24  1144 04/12/24  0815   POC GLUCOSE mg/dl 243* 203* 144* 208* 117 142* 71 172* 139         Results from last 7 days   Lab Units 04/12/24  0025   PROCALCITONIN ng/ml 0.07         Results from last 7 days   Lab Units 04/12/24  1107 04/12/24  1007 04/12/24  0400   BLOOD CULTURE   --  No Growth at 48 hrs.  No Growth at 48 hrs.  --    URINE CULTURE   --   --  >100,000 cfu/ml Candida tropicalis*  >100,000 cfu/ml Candida glabrata*  20,000-29,000 cfu/ml Citrobacter freundii*   LEGIONELLA URINARY ANTIGEN  Negative  --   --          Lines/Drains:  Invasive Devices       Peripheral Intravenous Line  Duration             Peripheral IV 04/12/24 Left Antecubital 2 days              Drain  Duration             Suprapubic Catheter -- days    Suprapubic Catheter 20 Fr. 16 days                      Last 24 Hours Medication List:   Current Facility-Administered Medications   Medication Dose Route Frequency Provider Last Rate    acetaminophen  650 mg Oral Q6H PRN Josselyn Dickerson DO      albuterol  2.5 mg Nebulization Q6H PRN Josselyn Lemon  Tuan, DO      amLODIPine  10 mg Oral Daily Josselyn Dickerson, DO      And    atorvastatin  80 mg Oral Daily Josselyn Dickerson, DO      baclofen  5 mg Oral TID Josselyn Dickerson, DO      budesonide-formoterol  2 puff Inhalation BID Josselyn Dickerson, DO      cefepime  1,000 mg Intravenous Q12H Josselyn Dickerson, DO 1,000 mg (04/14/24 1524)    clopidogrel  75 mg Oral Daily Josselyn Dickerson, DO      cyanocobalamin  500 mcg Oral Daily Josselyn Dickerson, DO      dextromethorphan-guaiFENesin  10 mL Oral Q4H PRN Josselyn Dickerson, DO      gabapentin  300 mg Oral BID Josselyn Dickerson, DO      gabapentin  600 mg Oral HS Josselyn Dickerson, DO      heparin (porcine)  5,000 Units Subcutaneous Q8H CIERA Josselyn Dickerson, DO      insulin lispro  1-6 Units Subcutaneous HS Josselyn Dickerson, DO      insulin lispro  2-12 Units Subcutaneous TID AC Josselyn Dickerson, DO      latanoprost  1 drop Both Eyes HS Josselyn Dickerson, DO      melatonin  3 mg Oral HS PRN Josselyn Dickerson, DO      methenamine hippurate  1 g Oral BID With Meals Josselyn Dickerson, DO      mirtazapine  7.5 mg Oral HS Josselyn Dickerson,       AMPARO ANTIFUNGAL   Topical BID Luz Jara MD      pantoprazole  40 mg Oral Daily Josselyn Dickerson, DO      polyethylene glycol  17 g Oral Daily Josselyn Dickerson, DO      propranolol  60 mg Oral Daily Josselyn Dickerson, DO      senna-docusate sodium  2 tablet Oral HS Josselyn Dickerson, DO LUZ JARA MD   Hospitalist - [facility]        ** Please Note: This note is constructed using a voice recognition dictation system.  An occasional wrong word/phrase or “sound-a-like” substitution may have been picked up by dictation device due to the inherent limitations of voice recognition software.  Read the chart carefully and recognize, using reasonable context, where  substitutions may have occurred.**      no

## 2024-04-14 NOTE — ASSESSMENT & PLAN NOTE
Initially presumed secondary to possible bilateral pneumonia, however, after discussion with ID stewardship provider with review of imaging, nodular opacities in the lung more possibly inflammatory rather than infectious -> already on empiric IV antibiotics (as below) for UTI  Serial troponins negative  EKG without significant ST-T wave changes, and essentially unchanged from prior (per cardiology read)  Symptoms resolving  Anticipate discharge tomorrow

## 2024-04-14 NOTE — PLAN OF CARE
Problem: Potential for Falls  Goal: Patient will remain free of falls  Description: INTERVENTIONS:  - Educate patient/family on patient safety including physical limitations  - Instruct patient to call for assistance with activity   - Consult OT/PT to assist with strengthening/mobility   - Keep Call bell within reach  - Keep bed low and locked with side rails adjusted as appropriate  - Keep care items and personal belongings within reach  - Initiate and maintain comfort rounds  - Make Fall Risk Sign visible to staff  - Offer Toileting every  Hours, in advance of need  - Initiate/Maintain bed alarm  - Obtain necessary fall risk management equipment:   - Apply yellow socks and bracelet for high fall risk patients  - Consider moving patient to room near nurses station  Outcome: Progressing     Problem: PAIN - ADULT  Goal: Verbalizes/displays adequate comfort level or baseline comfort level  Description: Interventions:  - Encourage patient to monitor pain and request assistance  - Assess pain using appropriate pain scale  - Administer analgesics based on type and severity of pain and evaluate response  - Implement non-pharmacological measures as appropriate and evaluate response  - Consider cultural and social influences on pain and pain management  - Notify physician/advanced practitioner if interventions unsuccessful or patient reports new pain  Outcome: Progressing     Problem: INFECTION - ADULT  Goal: Absence or prevention of progression during hospitalization  Description: INTERVENTIONS:  - Assess and monitor for signs and symptoms of infection  - Monitor lab/diagnostic results  - Monitor all insertion sites, i.e. indwelling lines, tubes, and drains  - Monitor endotracheal if appropriate and nasal secretions for changes in amount and color  - [place] appropriate cooling/warming therapies per order  - Administer medications as ordered  - Instruct and encourage patient and family to use good hand hygiene  technique  - Identify and instruct in appropriate isolation precautions for identified infection/condition  Outcome: Progressing  Goal: Absence of fever/infection during neutropenic period  Description: INTERVENTIONS:  - Monitor WBC    Outcome: Progressing     Problem: SAFETY ADULT  Goal: Patient will remain free of falls  Description: INTERVENTIONS:  - Educate patient/family on patient safety including physical limitations  - Instruct patient to call for assistance with activity   - Consult OT/PT to assist with strengthening/mobility   - Keep Call bell within reach  - Keep bed low and locked with side rails adjusted as appropriate  - Keep care items and personal belongings within reach  - Initiate and maintain comfort rounds  - Make Fall Risk Sign visible to staff  - Offer Toileting every  Hours, in advance of need  - Initiate/Maintain bed alarm  - Obtain necessary fall risk management equipment:   - Apply yellow socks and bracelet for high fall risk patients  - Consider moving patient to room near nurses station  Outcome: Progressing  Goal: Maintain or return to baseline ADL function  Description: INTERVENTIONS:  -  Assess patient's ability to carry out ADLs; assess patient's baseline for ADL function and identify physical deficits which impact ability to perform ADLs (bathing, care of mouth/teeth, toileting, grooming, dressing, etc.)  - Assess/evaluate cause of self-care deficits   - Assess range of motion  - Assess patient's mobility; develop plan if impaired  - Assess patient's need for assistive devices and provide as appropriate  - Encourage maximum independence but intervene and supervise when necessary  - Involve family in performance of ADLs  - Assess for home care needs following discharge   - Consider OT consult to assist with ADL evaluation and planning for discharge  - Provide patient education as appropriate  Outcome: Progressing  Goal: Maintains/Returns to pre admission functional level  Description:  INTERVENTIONS:  - Perform AM-PAC 6 Click Basic Mobility/ Daily Activity assessment daily.  - Set and communicate daily mobility goal to care team and patient/family/caregiver.   - Collaborate with rehabilitation services on mobility goals if consulted  - Perform Range of Motion times a day.  - Reposition patient every  hours.  - Dangle patient  times a day  - Stand patient  times a day  - Ambulate patient  times a day  - Out of bed to chair  times a day   - Out of bed for meals  times a day  - Out of bed for toileting  - Record patient progress and toleration of activity level   Outcome: Progressing     Problem: Prexisting or High Potential for Compromised Skin Integrity  Goal: Skin integrity is maintained or improved  Description: INTERVENTIONS:  - Identify patients at risk for skin breakdown  - Assess and monitor skin integrity  - Assess and monitor nutrition and hydration status  - Monitor labs   - Assess for incontinence   - Turn and reposition patient  - Assist with mobility/ambulation  - Relieve pressure over bony prominences  - Avoid friction and shearing  - Provide appropriate hygiene as needed including keeping skin clean and dry  - Evaluate need for skin moisturizer/barrier cream  - Collaborate with interdisciplinary team   - Patient/family teaching  - Consider wound care consult   Outcome: Progressing

## 2024-04-14 NOTE — PLAN OF CARE
Problem: Potential for Falls  Goal: Patient will remain free of falls  Description: INTERVENTIONS:  - Educate patient/family on patient safety including physical limitations  - Instruct patient to call for assistance with activity   - Consult OT/PT to assist with strengthening/mobility   - Keep Call bell within reach  - Keep bed low and locked with side rails adjusted as appropriate  - Keep care items and personal belongings within reach  - Initiate and maintain comfort rounds  - Make Fall Risk Sign visible to staff  - Offer Toileting every  Hours, in advance of need  - Initiate/Maintain alarm  - Obtain necessary fall risk management equipment:   Problem: INFECTION - ADULT  Goal: Absence or prevention of progression during hospitalization  Description: INTERVENTIONS:  - Assess and monitor for signs and symptoms of infection  - Monitor lab/diagnostic results  - Monitor all insertion sites, i.e. indwelling lines, tubes, and drains  - Monitor endotracheal if appropriate and nasal secretions for changes in amount and color  - [place] appropriate cooling/warming therapies per order  - Administer medications as ordered  - Instruct and encourage patient and family to use good hand hygiene technique  - Identify and instruct in appropriate isolation precautions for identified infection/condition  Outcome: Progressing  Goal: Absence of fever/infection during neutropenic period  Description: INTERVENTIONS:  - Monitor WBC    Outcome: Progressing     Problem: Prexisting or High Potential for Compromised Skin Integrity  Goal: Skin integrity is maintained or improved  Description: INTERVENTIONS:  - Identify patients at risk for skin breakdown  - Assess and monitor skin integrity  - Assess and monitor nutrition and hydration status  - Monitor labs   - Assess for incontinence   - Turn and reposition patient  - Assist with mobility/ambulation  - Relieve pressure over bony prominences  - Avoid friction and shearing  - Provide  appropriate hygiene as needed including keeping skin clean and dry  - Evaluate need for skin moisturizer/barrier cream  - Collaborate with interdisciplinary team   - Patient/family teaching  - Consider wound care consult   Outcome: Progressing     - Apply yellow socks and bracelet for high fall risk patients  - Consider moving patient to room near nurses station  Outcome: Progressing

## 2024-04-15 ENCOUNTER — APPOINTMENT (EMERGENCY)
Dept: RADIOLOGY | Facility: HOSPITAL | Age: 74
End: 2024-04-15
Payer: MEDICARE

## 2024-04-15 ENCOUNTER — HOSPITAL ENCOUNTER (EMERGENCY)
Facility: HOSPITAL | Age: 74
Discharge: HOME | End: 2024-04-16
Attending: EMERGENCY MEDICINE
Payer: MEDICARE

## 2024-04-15 VITALS
OXYGEN SATURATION: 97 % | HEART RATE: 63 BPM | DIASTOLIC BLOOD PRESSURE: 66 MMHG | SYSTOLIC BLOOD PRESSURE: 147 MMHG | RESPIRATION RATE: 14 BRPM

## 2024-04-15 VITALS
SYSTOLIC BLOOD PRESSURE: 118 MMHG | RESPIRATION RATE: 17 BRPM | DIASTOLIC BLOOD PRESSURE: 47 MMHG | OXYGEN SATURATION: 97 % | HEART RATE: 61 BPM | TEMPERATURE: 98.1 F

## 2024-04-15 DIAGNOSIS — R07.9 CHEST PAIN, UNSPECIFIED TYPE: Primary | ICD-10-CM

## 2024-04-15 LAB
ALBUMIN SERPL BCP-MCNC: 3.4 G/DL (ref 3.5–5)
ALP SERPL-CCNC: 71 U/L (ref 34–104)
ALT SERPL W P-5'-P-CCNC: 16 U/L (ref 7–52)
ANION GAP SERPL CALCULATED.3IONS-SCNC: 7 MMOL/L (ref 4–13)
ANION GAP SERPL CALCULATED.3IONS-SCNC: 8 MMOL/L (ref 4–13)
AST SERPL W P-5'-P-CCNC: 14 U/L (ref 13–39)
BASOPHILS # BLD AUTO: 0.15 THOUSANDS/ÂΜL (ref 0–0.1)
BASOPHILS # BLD AUTO: 0.15 THOUSANDS/ÂΜL (ref 0–0.1)
BASOPHILS NFR BLD AUTO: 1 % (ref 0–1)
BASOPHILS NFR BLD AUTO: 1 % (ref 0–1)
BILIRUB SERPL-MCNC: 0.3 MG/DL (ref 0.2–1)
BUN SERPL-MCNC: 12 MG/DL (ref 5–25)
BUN SERPL-MCNC: 13 MG/DL (ref 5–25)
CALCIUM ALBUM COR SERPL-MCNC: 9.9 MG/DL (ref 8.3–10.1)
CALCIUM SERPL-MCNC: 9 MG/DL (ref 8.4–10.2)
CALCIUM SERPL-MCNC: 9.4 MG/DL (ref 8.4–10.2)
CARDIAC TROPONIN I PNL SERPL HS: 4 NG/L
CHLORIDE SERPL-SCNC: 105 MMOL/L (ref 96–108)
CHLORIDE SERPL-SCNC: 106 MMOL/L (ref 96–108)
CO2 SERPL-SCNC: 25 MMOL/L (ref 21–32)
CO2 SERPL-SCNC: 25 MMOL/L (ref 21–32)
CREAT SERPL-MCNC: 0.77 MG/DL (ref 0.6–1.3)
CREAT SERPL-MCNC: 0.8 MG/DL (ref 0.6–1.3)
EOSINOPHIL # BLD AUTO: 0.85 THOUSAND/ÂΜL (ref 0–0.61)
EOSINOPHIL # BLD AUTO: 1.01 THOUSAND/ÂΜL (ref 0–0.61)
EOSINOPHIL NFR BLD AUTO: 7 % (ref 0–6)
EOSINOPHIL NFR BLD AUTO: 8 % (ref 0–6)
ERYTHROCYTE [DISTWIDTH] IN BLOOD BY AUTOMATED COUNT: 14.6 % (ref 11.6–15.1)
ERYTHROCYTE [DISTWIDTH] IN BLOOD BY AUTOMATED COUNT: 14.6 % (ref 11.6–15.1)
GFR SERPL CREATININE-BSD FRML MDRD: 88 ML/MIN/1.73SQ M
GFR SERPL CREATININE-BSD FRML MDRD: 89 ML/MIN/1.73SQ M
GLUCOSE SERPL-MCNC: 136 MG/DL (ref 65–140)
GLUCOSE SERPL-MCNC: 150 MG/DL (ref 65–140)
GLUCOSE SERPL-MCNC: 157 MG/DL (ref 65–140)
GLUCOSE SERPL-MCNC: 161 MG/DL (ref 65–140)
HCT VFR BLD AUTO: 37.5 % (ref 36.5–49.3)
HCT VFR BLD AUTO: 41.8 % (ref 36.5–49.3)
HGB BLD-MCNC: 12 G/DL (ref 12–17)
HGB BLD-MCNC: 13.7 G/DL (ref 12–17)
IMM GRANULOCYTES # BLD AUTO: 0.05 THOUSAND/UL (ref 0–0.2)
IMM GRANULOCYTES # BLD AUTO: 0.06 THOUSAND/UL (ref 0–0.2)
IMM GRANULOCYTES NFR BLD AUTO: 0 % (ref 0–2)
IMM GRANULOCYTES NFR BLD AUTO: 1 % (ref 0–2)
LIPASE SERPL-CCNC: 22 U/L (ref 11–82)
LYMPHOCYTES # BLD AUTO: 3.3 THOUSANDS/ÂΜL (ref 0.6–4.47)
LYMPHOCYTES # BLD AUTO: 4.57 THOUSANDS/ÂΜL (ref 0.6–4.47)
LYMPHOCYTES NFR BLD AUTO: 26 % (ref 14–44)
LYMPHOCYTES NFR BLD AUTO: 37 % (ref 14–44)
MCH RBC QN AUTO: 30 PG (ref 26.8–34.3)
MCH RBC QN AUTO: 30.1 PG (ref 26.8–34.3)
MCHC RBC AUTO-ENTMCNC: 32 G/DL (ref 31.4–37.4)
MCHC RBC AUTO-ENTMCNC: 32.8 G/DL (ref 31.4–37.4)
MCV RBC AUTO: 92 FL (ref 82–98)
MCV RBC AUTO: 94 FL (ref 82–98)
MONOCYTES # BLD AUTO: 0.78 THOUSAND/ÂΜL (ref 0.17–1.22)
MONOCYTES # BLD AUTO: 0.78 THOUSAND/ÂΜL (ref 0.17–1.22)
MONOCYTES NFR BLD AUTO: 6 % (ref 4–12)
MONOCYTES NFR BLD AUTO: 6 % (ref 4–12)
NEUTROPHILS # BLD AUTO: 5.79 THOUSANDS/ÂΜL (ref 1.85–7.62)
NEUTROPHILS # BLD AUTO: 7.73 THOUSANDS/ÂΜL (ref 1.85–7.62)
NEUTS SEG NFR BLD AUTO: 48 % (ref 43–75)
NEUTS SEG NFR BLD AUTO: 59 % (ref 43–75)
NRBC BLD AUTO-RTO: 0 /100 WBCS
NRBC BLD AUTO-RTO: 0 /100 WBCS
PLATELET # BLD AUTO: 305 THOUSANDS/UL (ref 149–390)
PLATELET # BLD AUTO: 334 THOUSANDS/UL (ref 149–390)
PMV BLD AUTO: 8.4 FL (ref 8.9–12.7)
PMV BLD AUTO: 9.3 FL (ref 8.9–12.7)
POTASSIUM SERPL-SCNC: 3.7 MMOL/L (ref 3.5–5.3)
POTASSIUM SERPL-SCNC: 4.1 MMOL/L (ref 3.5–5.3)
PROT SERPL-MCNC: 6.7 G/DL (ref 6.4–8.4)
RBC # BLD AUTO: 3.99 MILLION/UL (ref 3.88–5.62)
RBC # BLD AUTO: 4.57 MILLION/UL (ref 3.88–5.62)
SODIUM SERPL-SCNC: 138 MMOL/L (ref 135–147)
SODIUM SERPL-SCNC: 138 MMOL/L (ref 135–147)
WBC # BLD AUTO: 12.35 THOUSAND/UL (ref 4.31–10.16)
WBC # BLD AUTO: 12.87 THOUSAND/UL (ref 4.31–10.16)

## 2024-04-15 PROCEDURE — 80053 COMPREHEN METABOLIC PANEL: CPT | Performed by: STUDENT IN AN ORGANIZED HEALTH CARE EDUCATION/TRAINING PROGRAM

## 2024-04-15 PROCEDURE — 97110 THERAPEUTIC EXERCISES: CPT

## 2024-04-15 PROCEDURE — 83690 ASSAY OF LIPASE: CPT | Performed by: STUDENT IN AN ORGANIZED HEALTH CARE EDUCATION/TRAINING PROGRAM

## 2024-04-15 PROCEDURE — 36415 COLL VENOUS BLD VENIPUNCTURE: CPT | Performed by: STUDENT IN AN ORGANIZED HEALTH CARE EDUCATION/TRAINING PROGRAM

## 2024-04-15 PROCEDURE — 84484 ASSAY OF TROPONIN QUANT: CPT | Performed by: STUDENT IN AN ORGANIZED HEALTH CARE EDUCATION/TRAINING PROGRAM

## 2024-04-15 PROCEDURE — 93005 ELECTROCARDIOGRAM TRACING: CPT

## 2024-04-15 PROCEDURE — 80048 BASIC METABOLIC PNL TOTAL CA: CPT | Performed by: INTERNAL MEDICINE

## 2024-04-15 PROCEDURE — 82948 REAGENT STRIP/BLOOD GLUCOSE: CPT

## 2024-04-15 PROCEDURE — 97530 THERAPEUTIC ACTIVITIES: CPT

## 2024-04-15 PROCEDURE — 85025 COMPLETE CBC W/AUTO DIFF WBC: CPT | Performed by: STUDENT IN AN ORGANIZED HEALTH CARE EDUCATION/TRAINING PROGRAM

## 2024-04-15 PROCEDURE — 97535 SELF CARE MNGMENT TRAINING: CPT

## 2024-04-15 PROCEDURE — 99285 EMERGENCY DEPT VISIT HI MDM: CPT

## 2024-04-15 PROCEDURE — 71046 X-RAY EXAM CHEST 2 VIEWS: CPT

## 2024-04-15 PROCEDURE — 99239 HOSP IP/OBS DSCHRG MGMT >30: CPT | Performed by: INTERNAL MEDICINE

## 2024-04-15 PROCEDURE — 85025 COMPLETE CBC W/AUTO DIFF WBC: CPT | Performed by: INTERNAL MEDICINE

## 2024-04-15 RX ORDER — SODIUM CHLORIDE 9 MG/ML
3 INJECTION INTRAVENOUS
Status: DISCONTINUED | OUTPATIENT
Start: 2024-04-15 | End: 2024-04-16 | Stop reason: HOSPADM

## 2024-04-15 RX ORDER — CEFPODOXIME PROXETIL 200 MG/1
200 TABLET, FILM COATED ORAL 2 TIMES DAILY
Start: 2024-04-15 | End: 2024-04-19

## 2024-04-15 RX ADMIN — BUDESONIDE AND FORMOTEROL FUMARATE DIHYDRATE 2 PUFF: 160; 4.5 AEROSOL RESPIRATORY (INHALATION) at 08:33

## 2024-04-15 RX ADMIN — HEPARIN SODIUM 5000 UNITS: 5000 INJECTION INTRAVENOUS; SUBCUTANEOUS at 05:18

## 2024-04-15 RX ADMIN — BACLOFEN 5 MG: 10 TABLET ORAL at 08:34

## 2024-04-15 RX ADMIN — GABAPENTIN 300 MG: 300 CAPSULE ORAL at 08:34

## 2024-04-15 RX ADMIN — CYANOCOBALAMIN TAB 500 MCG 500 MCG: 500 TAB at 08:34

## 2024-04-15 RX ADMIN — CEFEPIME 1000 MG: 1 INJECTION, POWDER, FOR SOLUTION INTRAMUSCULAR; INTRAVENOUS at 02:21

## 2024-04-15 RX ADMIN — AMLODIPINE BESYLATE 10 MG: 10 TABLET ORAL at 08:34

## 2024-04-15 RX ADMIN — ACETAMINOPHEN 650 MG: 325 TABLET, FILM COATED ORAL at 02:21

## 2024-04-15 RX ADMIN — PROPRANOLOL HYDROCHLORIDE 60 MG: 60 CAPSULE, EXTENDED RELEASE ORAL at 08:34

## 2024-04-15 RX ADMIN — ATORVASTATIN CALCIUM 80 MG: 80 TABLET, FILM COATED ORAL at 08:34

## 2024-04-15 RX ADMIN — INSULIN LISPRO 2 UNITS: 100 INJECTION, SOLUTION INTRAVENOUS; SUBCUTANEOUS at 12:05

## 2024-04-15 RX ADMIN — METHENAMINE HIPPURATE 1 G: 1 TABLET ORAL at 08:34

## 2024-04-15 RX ADMIN — GABAPENTIN 300 MG: 300 CAPSULE ORAL at 13:03

## 2024-04-15 RX ADMIN — CLOPIDOGREL BISULFATE 75 MG: 75 TABLET ORAL at 08:34

## 2024-04-15 RX ADMIN — MICONAZOLE NITRATE: 20 CREAM TOPICAL at 08:33

## 2024-04-15 RX ADMIN — PANTOPRAZOLE SODIUM 40 MG: 40 TABLET, DELAYED RELEASE ORAL at 08:34

## 2024-04-15 RX ADMIN — HEPARIN SODIUM 5000 UNITS: 5000 INJECTION INTRAVENOUS; SUBCUTANEOUS at 13:03

## 2024-04-15 NOTE — DISCHARGE SUMMARY
Discharge Summary - [facility]  Patient: Mathew Rico 73 y.o. male   MRN: [id number]  PCP: Carolina Kumari MD  [facility] Encounter: [id number]            Discharging Physician / Practitioner: Luz Jara MD  PCP: Carolina Kumari MD  Admission Date:   Admission Orders (From admission, onward)       Ordered        04/12/24 0338  INPATIENT ADMISSION  Once                          Discharge Date: 04/15/24      Reason for Admission:  Chest and abdominal discomfort      Discharge Diagnoses:     Principal Problem:    Atypical chest pain    Active Problems:    Urinary tract infection associated with cystostomy catheter  (HCC)    Multiple sclerosis ([facility])    Obstructive sleep apnea    Neurogenic bladder    Primary hypertension    Type 2 diabetes mellitus with neuropathy ([facility])    Leukocytosis      Consultations During Hospital Stay:  None      Hospital Course:     * Atypical chest pain  Assessment & Plan  Initially presumed secondary to possible bilateral pneumonia, however, after discussion with ID stewardship provider with review of imaging, nodular opacities in the lung more possibly inflammatory rather than infectious -> already on empiric IV antibiotics (as below) for UTI  Serial troponins negative  EKG without significant ST-T wave changes, and essentially unchanged from prior (per cardiology read)  Symptoms resolving  Plan for discharge to skilled rehab today     Urinary tract infection associated with cystostomy catheter  (HCC)  Assessment & Plan  As suspected on CT imaging with urinalysis revealing large pyuria and moderate bacteriuria  Will transition empiric IV Cefepime to oral Vantin on discharge to complete course - urine culture with growth of Citrobacter (also noting Candida, however, likely colonization in the setting of multiple prior urine cultures with similar)     Multiple sclerosis (HCC)  Assessment & Plan  Continue Baclofen  S/p suprapubic catheter for  associated neurogenic bladder  Outpatient neurology follow-up     Obstructive sleep apnea  Assessment & Plan  CPAP QHS     Neurogenic bladder  Assessment & Plan  In the setting of known multiple sclerosis  Status post suprapubic catheter - patient reports last exchange approximately 2 days prior to admission     Primary hypertension  Assessment & Plan  Continue Norvasc/Inderal     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)  Assessment & Plan        Lab Results   Component Value Date     HGBA1C 6.4 (H) 03/26/2024      Held oral hypoglycemics while hospitalized -> resume home regimen on discharge  Maintained on SSI coverage per Accu-Cheks during hospital course  Carbohydrate restriction  On Gabapentin for neuropathy     Leukocytosis  Assessment & Plan  Likely reactive secondary to infection  Generally improved       Condition at Discharge: fair       Discharge Day Visit / Exam:     Vitals:  Blood Pressure: (!) 118/47 (04/15/24 1518)  Pulse: 61 (04/15/24 1518)  Temperature: 98.1 °F (36.7 °C) (04/15/24 1518)  Temp Source: Oral (04/11/24 2303)  Respirations: 17 (04/15/24 1518)  SpO2: 97 % (04/15/24 1518)      Physical exam:  I had a face-to-face encounter with the patient on day of discharge.      Discussion with Patient and/or Family:  The patient has been advised to return to the ER immediately if any symptoms recur or worsen.       Discharge instructions/Information to Patient and/or Family:   See after visit summary for information provided to patient and/or family.        Provisions for Follow-Up Care:  See after visit summary for information related to follow-up care and any pertinent home health orders.        Disposition:   Skilled rehab facility      Discharge Medications:  See after visit summary for reconciled discharge medications provided to patient and/or family.        Discharge Statement:  I spent 38 minutes discharging the patient. This time was spent on the day of discharge. I had direct contact  with the patient on the day of discharge. Greater than 50% of the total time was spent examining patient, answering all patient questions, arranging and discussing plan of care with patient as well as directly providing post-discharge instructions.  Additional time then spent on discharge activities.           NILAM BARTON MD   Hospitalist - [facility]        ** Please Note: This note is constructed using a voice recognition dictation system.  An occasional wrong word/phrase or “sound-a-like” substitution may have been picked up by dictation device due to the inherent limitations of voice recognition software.  Read the chart carefully and recognize, using reasonable context, where substitutions may have occurred.**

## 2024-04-15 NOTE — PLAN OF CARE
Problem: PHYSICAL THERAPY ADULT  Goal: Performs mobility at highest level of function for planned discharge setting.  See evaluation for individualized goals.  Description: Treatment/Interventions: Functional transfer training, LE strengthening/ROM, Therapeutic exercise, Endurance training, Patient/family training, Equipment eval/education, Bed mobility, Spoke to nursing, Spoke to case management, OT  Equipment Recommended: Wheelchair       See flowsheet documentation for full assessment, interventions and recommendations.  Outcome: Progressing  Note: Prognosis: Fair  Problem List: Decreased strength, Decreased endurance, Impaired balance, Decreased mobility, Impaired judgement, Decreased safety awareness, Decreased cognition  Assessment: Pt seen for PT treatment session this date. Therapy session focused on bed mobility, sitting tolerance/ balance, functional transfers and therex in order to improve overall mobility and independence. Pt requires ax1 for bed mobility, ax2 for functional transfers. Improved sitting tolerance compared to previous session however does require min A when unsupported and without UE use. AAROM of b/l Les performed with good tolerance, b/l LE clonus observed. Pt making progress toward goals. Pt was left sitting OOB in recliner with alarm on at the end of PT session with all needs in reach. Pt would benefit from continued PT services while in hospital to address remaining limitations. PT to continue to follow pt and recommends level II resource intensity. The patient's AM-PAC Basic Mobility Inpatient Short Form Raw Score is 8. A Raw score of less than or equal to 16 suggests the patient may benefit from discharge to post-acute rehabilitation services. Please also refer to the recommendation of the Physical Therapist for safe discharge planning.  Barriers to Discharge: Inaccessible home environment, Decreased caregiver support     Rehab Resource Intensity Level, PT: II (Moderate Resource  Intensity)    See flowsheet documentation for full assessment.

## 2024-04-15 NOTE — PHYSICAL THERAPY NOTE
PHYSICAL THERAPY NOTE          Patient Name: Mathew Rico  Today's Date: 4/15/2024       04/15/24 1016   PT Last Visit   PT Visit Date 04/15/24   Note Type   Note Type Treatment   Pain Assessment   Pain Assessment Tool 0-10   Pain Score No Pain   Restrictions/Precautions   Weight Bearing Precautions Per Order No   Other Precautions Cognitive;Chair Alarm;Bed Alarm;Multiple lines;Fall Risk;Contact/isolation;Visual impairment  (b/l LE clonus)   General   Chart Reviewed Yes   Response to Previous Treatment Patient with no complaints from previous session.   Family/Caregiver Present No   Cognition   Arousal/Participation Alert;Responsive;Cooperative   Attention Attends with cues to redirect   Orientation Level Oriented to person;Oriented to place;Disoriented to time   Memory Decreased recall of recent events;Decreased recall of precautions;Decreased short term memory   Following Commands Follows one step commands with increased time or repetition   Comments pleasant and cooperative   Bed Mobility   Supine to Sit 3  Moderate assistance   Additional items Assist x 1;Bedrails;HOB elevated;Increased time required;Verbal cues   Sit to Supine Unable to assess   Additional Comments pt supine in bed upon arrival. Pt left sitting OOB in recliner with all needs wihtin reach- alarm on   Transfers   Sit to Stand 3  Moderate assistance   Additional items Assist x 2;Increased time required   Stand to Sit 3  Moderate assistance   Additional items Assist x 2;Increased time required;Verbal cues   Stand pivot 2  Maximal assistance   Additional items Assist x 2;Increased time required;Verbal cues   Additional Comments completed with b/l HHA  (2x STS)   Ambulation/Elevation   Gait pattern Not appropriate   Balance   Static Sitting Fair -   Dynamic Sitting Poor +   Static Standing Poor -   Dynamic Standing Poor -   Ambulatory Zero   Endurance Deficit    Endurance Deficit Yes   Activity Tolerance   Activity Tolerance Patient tolerated treatment well   Medical Staff Made Aware OT Samantha; co-session completed this date 2* increased medical complexity and multiple co-morbdites   Nurse Made Aware RN cleared   Exercises   Hip Flexion Sitting;Bilateral;10 reps;AAROM   Knee AROM Long Arc Quad AAROM;10 reps;Bilateral   Ankle Pumps Sitting;Bilateral;10 reps;AAROM   UE Exercise Sitting;Bilateral;10 reps;AROM   Balance training  sitting EOB ~15 min with occasional min A to correct posterior lean when completing dynamic unsupported tasks   Assessment   Prognosis Fair   Problem List Decreased strength;Decreased endurance;Impaired balance;Decreased mobility;Impaired judgement;Decreased safety awareness;Decreased cognition   Assessment Pt seen for PT treatment session this date. Therapy session focused on bed mobility, sitting tolerance/ balance, functional transfers and therex in order to improve overall mobility and independence. Pt requires ax1 for bed mobility, ax2 for functional transfers. Improved sitting tolerance compared to previous session however does require min A when unsupported and without UE use. AAROM of b/l Les performed with good tolerance, b/l LE clonus observed. Pt making progress toward goals. Pt was left sitting OOB in recliner with alarm on at the end of PT session with all needs in reach. Pt would benefit from continued PT services while in hospital to address remaining limitations. PT to continue to follow pt and recommends level II resource intensity. The patient's AM-PAC Basic Mobility Inpatient Short Form Raw Score is 8. A Raw score of less than or equal to 16 suggests the patient may benefit from discharge to post-acute rehabilitation services. Please also refer to the recommendation of the Physical Therapist for safe discharge planning.   Barriers to Discharge Inaccessible home environment;Decreased caregiver support   Goals   Patient Goals to get  OOB   STG Expiration Date 04/26/24   PT Treatment Day 1   Plan   Treatment/Interventions Functional transfer training;LE strengthening/ROM;Therapeutic exercise;Endurance training;Patient/family training;Equipment eval/education;Bed mobility;Spoke to nursing;Spoke to case management;OT   Progress Slow progress, decreased activity tolerance   PT Frequency 2-3x/wk   Discharge Recommendation   Rehab Resource Intensity Level, PT II (Moderate Resource Intensity)   Equipment Recommended Wheelchair   AM-PAC Basic Mobility Inpatient   Turning in Flat Bed Without Bedrails 2   Lying on Back to Sitting on Edge of Flat Bed Without Bedrails 2   Moving Bed to Chair 1   Standing Up From Chair Using Arms 1   Walk in Room 1   Climb 3-5 Stairs With Railing 1   Basic Mobility Inpatient Raw Score 8   [facility] Highest Level Of Mobility   -HLM Goal 3: Sit at edge of bed   -HLM Achieved 4: Move to chair/commode   Education   Education Provided Mobility training   Patient Demonstrates acceptance/verbal understanding   End of Consult   Patient Position at End of Consult Bedside chair;Bed/Chair alarm activated;All needs within reach     Mattie Mcmanus, PT, DPT

## 2024-04-15 NOTE — DISCHARGE INSTR - OTHER ORDERS
Skin care plans:  1-Hydraguard to bilateral heels BID and PRN  2-Elevate heels to offload pressure.  3-Ehob cushion in chair when out of bed.  4-Moisturize skin daily with skin nourishing cream.  5-Turn/reposition q2h or when medically stable for pressure re-distribution on skin.   6-Calazime paste to sacrum/buttocks BID and PRN

## 2024-04-15 NOTE — CASE MANAGEMENT
Case Management Discharge Planning Note    Patient name Mathew Rico  Location [facility] 919/[facility] 919-01 MRN [id number]  : 1950 Date 4/15/2024       Current Admission Date: 2024  Current Admission Diagnosis:Chest pain   Patient Active Problem List    Diagnosis Date Noted    Chest pain 2024    Acute encephalopathy 2024    Leukocytosis 2024    GERSON on CPAP 2024    Fall 2024    Primary hypertension 2024    Type 2 diabetes mellitus without complication, without long-term current use of insulin ([facility]) 2024    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HCC) 2024    Multiple sclerosis ([facility]) 2024    Urinary retention 2024    Neck pain 2024    Vitamin B deficiency 2024    Generalized weakness 2024    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    Urinary tract infection associated with cystostomy catheter  (HCC) 02/15/2024    Fall 2023    Weakness 2023    Accidental fall from chair 2023    Constipation 2023    Chronic diastolic congestive heart failure (HCC) 2023    Hyponatremia 2023    Excessive daytime sleepiness 2022    Shortness of breath 2022    Sepsis ([facility]) 2021    Pancreatic mass 2020    Pancreatic lesion 2020    Bladder stones 2019    Bladder neck contracture 2019    History of CVA (cerebrovascular accident) 10/21/2018    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HCC) 2017    Diabetic neuropathy ([facility]) 2016    Neurogenic bladder 2016    Thyroid nodule 2015    Cervical spinal stenosis 2014    Generalized anxiety disorder 2014    Obstructive sleep apnea 2013    Benign colon polyp 2012    Esophageal reflux 2012    Fatty liver 2012    Glaucoma 2012    Hyperlipidemia 2012    Multiple sclerosis (HCC) 2012    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  [address] [facility] [address]  Phone: [phone] Fax: [phone]    [place], [place] - [address]  [address] [facility] [address] [facility] [address]  Phone: [phone] Fax: [phone]    [facility] [place], [place] - [address],  [address]  [address] [facility] [address]  Phone: [phone] Fax: [phone]     [facility] - [place], [place] - [address] [place] [address]  Phone: [phone] Fax: [phone]    Primary Care Provider: Carolina Kumari MD    Primary Insurance: [facility]  Secondary Insurance:     DISCHARGE DETAILS:     Other Referral/Resources/Interventions Provided:  Referral Comments: Received vm from Renay Soler - pt's CM @ [facility] - requesting update on dcp.  Called back - pt clear for dc today.  Bed available @ [facility].  Approval obtained from Renay Soler to return to [facility] today. Notified provider of same.  Transportation request submitted through [employer] - awaiting accepting transport company.

## 2024-04-15 NOTE — WOUND OSTOMY CARE
Consult Note - Wound   Mathew Rico 73 y.o. male MRN: [id number]  Unit/Bed#: [facility] 919-01 Encounter: [id number]      Assessment Findings:     Wound care consulted for sacral/buttocks and scrotal MASD.    Sacral/buttocks/scrotum and B/L heels intact and blanching, preventative skin care orders placed.       Orders listed below and wound care will sign off, call or tiger text with questions. Bedside nurse updated of findings and orders. Flowsheets below with pictures and measurements.     Skin care plans:  1-Hydraguard to bilateral heels BID and PRN  2-Elevate heels to offload pressure.  3-Ehob cushion in chair when out of bed.  4-Moisturize skin daily with skin nourishing cream.  5-Turn/reposition q2h or when medically stable for pressure re-distribution on skin.   6-Calazime paste to sacrum/buttocks BID and PRN    Wounds:     Sacral/buttocks              Maureen MCARTHURN, RN, CWOCN

## 2024-04-15 NOTE — PLAN OF CARE
Problem: OCCUPATIONAL THERAPY ADULT  Goal: Performs self-care activities at highest level of function for planned discharge setting.  See evaluation for individualized goals.  Description: Treatment Interventions: ADL retraining, Functional transfer training, UE strengthening/ROM, Endurance training, Cognitive reorientation, Patient/family training, Equipment evaluation/education, Compensatory technique education, Continued evaluation, Energy conservation, Activityengagement          See flowsheet documentation for full assessment, interventions and recommendations.   Note: Limitation: Decreased ADL status, Decreased UE ROM, Decreased UE strength, Decreased cognition, Decreased endurance, Decreased self-care trans, Decreased high-level ADLs     Assessment: Pt seen for skilled OT treatment session from 0935 to 1017 w/ interventions focusing on ADL participation, activity tolerance, sitting tolerance, sitting balance, standing tolerance, standing balance, bed mobility , transfer skills, and fxnl mobility. Pt was agreeable and willing to participate in session. Pt engaged in the following tasks: S for grooming tasks seated EOB, min A for UB ADLs, and mod-max A for LB ADLs. Pt also required mod Ax1 for bed mobility, mod Ax2 for STS transfers, and max Ax2 for SPT w/ b/l HHA. In comparison to previous session, pt demonstrated improvements in bed mobility as he required less physical assistance to complete this mobility task. Pt continues to be functioning below baseline level as occupational performance remains limited by decreased UE ROM/strength, impaired cognition, decreased ADL status, decreased activity tolerance, decreased endurance, decreased sitting tolerance, decreased sitting balance, decreased standing tolerance, decreased standing balance, decreased transfer skills, decreased fxnl mobility, generalized weakness , and decreased vision . From OT standpoint, recommend Level II (Moderate Resource Intensity) at  time of d/c. Pt will benefit from continued OT treatment while in acute care to address deficits as defined above and maximize level of functional independence with ADLs and functional mobility. Pt seated OOB in chair w/ alarm activated and all needs met at end of session.     Rehab Resource Intensity Level, OT: II (Moderate Resource Intensity)

## 2024-04-15 NOTE — OCCUPATIONAL THERAPY NOTE
Occupational Therapy Progress Note     Patient Name: Mathew Rico  Today's Date: 4/15/2024  Problem List  Principal Problem:    Chest pain  Active Problems:    Neurogenic bladder    Primary hypertension    Multiple sclerosis (HCC)    Obstructive sleep apnea    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)    Urinary tract infection associated with cystostomy catheter  (HCC)    Leukocytosis       04/15/24 1017   OT Last Visit   OT Visit Date 04/15/24   Note Type   Note Type Treatment   Pain Assessment   Pain Assessment Tool 0-10   Pain Score No Pain   Restrictions/Precautions   Weight Bearing Precautions Per Order No   Other Precautions Cognitive;Chair Alarm;Bed Alarm;Multiple lines;Fall Risk;Contact/isolation   Lifestyle   Autonomy Pt reports requiring A w/ ADLs and IADLs and mod I w/ WC for fxnl mobility at baseline; - driving. Pt also reports SBA for SPT in/out of WC at baseline.   Reciprocal Relationships Pt lives w/ his brother and sister.   Service to Others Pt is retired.   Intrinsic Gratification Pt enjoys watching TV and drinking coffee.   ADL   Where Assessed Edge of bed   Grooming Assistance 5  Supervision/Setup   Grooming Deficit Setup;Supervision/safety;Increased time to complete;Wash/dry face   UB Bathing Assistance 4  Minimal Assistance   UB Bathing Deficit Setup;Verbal cueing;Supervision/safety;Increased time to complete;Chest;Right arm;Left arm;Abdomen   LB Bathing Assistance 3  Moderate Assistance   LB Bathing Deficit Setup;Verbal cueing;Supervision/safety;Increased time to complete;Perineal area;Buttocks;Right upper leg;Left upper leg;Right lower leg including foot;Left lower leg including foot   UB Dressing Assistance 4  Minimal Assistance   UB Dressing Deficit Setup;Verbal cueing;Supervision/safety;Increased time to complete;Thread RUE;Thread LUE;Pull around back;Fasteners   LB Dressing Assistance 2  Maximal Assistance   LB Dressing Deficit Setup;Don/doff R shoe;Don/doff L shoe   Bed  Mobility   Supine to Sit 3  Moderate assistance   Additional items Assist x 1;HOB elevated;Increased time required;Verbal cues;LE management   Sit to Supine Unable to assess   Additional Comments Pt sat EOB w/ min Ax1, especially during fxnl tasks that required the use of BUE. Pt seated OOB in chair at end of OT tx session w/ alarm activated and all needs within reach.   Transfers   Sit to Stand 3  Moderate assistance   Additional items Assist x 2;Increased time required;Verbal cues   Stand to Sit 3  Moderate assistance   Additional items Assist x 2;Increased time required;Verbal cues   Stand pivot 2  Maximal assistance   Additional items Assist x 2;Increased time required;Verbal cues   Additional Comments completed w/ b/l HHA; x2 STS transfers t/o session   Cognition   Overall Cognitive Status Impaired   Arousal/Participation Alert;Responsive;Cooperative   Attention Attends with cues to redirect   Orientation Level Oriented to person;Oriented to place;Disoriented to time   Memory Decreased recall of recent events;Decreased recall of precautions;Decreased short term memory   Following Commands Follows one step commands with increased time or repetition   Comments Pt was pleasant, cooperative, and willing to participate in OT tx session today.   Activity Tolerance   Activity Tolerance Patient limited by fatigue;Other (Comment)  (impaired cognition)   Medical Staff Made Aware RN clearance prior to session; PTMattie, due to pt's medical complexity and multiple comorbidities   Assessment   Assessment Pt seen for skilled OT treatment session from 0935 to 1017 w/ interventions focusing on ADL participation, activity tolerance, sitting tolerance, sitting balance, standing tolerance, standing balance, bed mobility , transfer skills, and fxnl mobility. Pt was agreeable and willing to participate in session. Pt engaged in the following tasks: S for grooming tasks seated EOB, min A for UB ADLs, and mod-max A for LB ADLs. Pt  also required mod Ax1 for bed mobility, mod Ax2 for STS transfers, and max Ax2 for SPT w/ b/l HHA. In comparison to previous session, pt demonstrated improvements in bed mobility as he required less physical assistance to complete this mobility task. Pt continues to be functioning below baseline level as occupational performance remains limited by decreased UE ROM/strength, impaired cognition, decreased ADL status, decreased activity tolerance, decreased endurance, decreased sitting tolerance, decreased sitting balance, decreased standing tolerance, decreased standing balance, decreased transfer skills, decreased fxnl mobility, generalized weakness , and decreased vision . From OT standpoint, recommend Level II (Moderate Resource Intensity) at time of d/c. Pt will benefit from continued OT treatment while in acute care to address deficits as defined above and maximize level of functional independence with ADLs and functional mobility. Pt seated OOB in chair w/ alarm activated and all needs met at end of session.   Plan   Treatment Interventions ADL retraining;Functional transfer training;UE strengthening/ROM;Endurance training;Cognitive reorientation;Patient/family training;Equipment evaluation/education;Compensatory technique education;Continued evaluation;Energy conservation;Activityengagement   Goal Expiration Date 04/26/24   OT Treatment Day 1   OT Frequency 2-3x/wk   Discharge Recommendation   Rehab Resource Intensity Level, OT II (Moderate Resource Intensity)   AM-PAC Daily Activity Inpatient   Lower Body Dressing 2   Bathing 2   Toileting 2   Upper Body Dressing 3   Grooming 3   Eating 3   Daily Activity Raw Score 15   Daily Activity Standardized Score (Calc for Raw Score >=11) 34.69   AM-PAC Applied Cognition Inpatient   Following a Speech/Presentation 3   Understanding Ordinary Conversation 4   Taking Medications 3   Remembering Where Things Are Placed or Put Away 3   Remembering List of 4-5 Errands 2    Taking Care of Complicated Tasks 2   Applied Cognition Raw Score 17   Applied Cognition Standardized Score 36.52       The patient's raw score on the AM-PAC Daily Activity Inpatient Short Form is 15. A raw score of less than 19 suggests the patient may benefit from discharge to post-acute rehabilitation services. Please refer to the recommendation of the Occupational Therapist for safe discharge planning.    Samantha Malcolm MS, OTR/L

## 2024-04-15 NOTE — CASE MANAGEMENT
Case Management Discharge Planning Note    Patient name Mathew Rico  Location [facility] 919/[facility] 919-01 MRN [id number]  : 1950 Date 4/15/2024       Current Admission Date: 2024  Current Admission Diagnosis:Chest pain   Patient Active Problem List    Diagnosis Date Noted    Chest pain 2024    Acute encephalopathy 2024    Leukocytosis 2024    GERSON on CPAP 2024    Fall 2024    Primary hypertension 2024    Type 2 diabetes mellitus without complication, without long-term current use of insulin ([facility]) 2024    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HCC) 2024    Multiple sclerosis ([facility]) 2024    Urinary retention 2024    Neck pain 2024    Vitamin B deficiency 2024    Generalized weakness 2024    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    Urinary tract infection associated with cystostomy catheter  (HCC) 02/15/2024    Fall 2023    Weakness 2023    Accidental fall from chair 2023    Constipation 2023    Chronic diastolic congestive heart failure (HCC) 2023    Hyponatremia 2023    Excessive daytime sleepiness 2022    Shortness of breath 2022    Sepsis ([facility]) 2021    Pancreatic mass 2020    Pancreatic lesion 2020    Bladder stones 2019    Bladder neck contracture 2019    History of CVA (cerebrovascular accident) 10/21/2018    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HCC) 2017    Diabetic neuropathy ([facility]) 2016    Neurogenic bladder 2016    Thyroid nodule 2015    Cervical spinal stenosis 2014    Generalized anxiety disorder 2014    Obstructive sleep apnea 2013    Benign colon polyp 2012    Esophageal reflux 2012    Fatty liver 2012    Glaucoma 2012    Hyperlipidemia 2012    Multiple sclerosis (HCC) 2012    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) 2012    Primary hypertension 2012      LOS (days):  3  Geometric Mean LOS (GMLOS) (days): 2.9  Days to GMLOS:-0.6     OBJECTIVE:  Risk of Unplanned Readmission Score: 48.53         Current admission status: Inpatient   Preferred Pharmacy:   [id number] [place] - [address]  [address] [address]  Phone: [phone] Fax: [phone]    [place], [place] - [address]  [address] [address]  Phone: [phone] Fax: [phone]    [facility] [place], [place] - [address],  [address]  [address] [facility] [address]  Phone: [phone] Fax: [phone]     [facility] - [place], [place] - [address]  [address] [address]  Phone: [phone] Fax: [phone]    Primary Care Provider: Carolina Kumari MD    Primary Insurance: [facility]  Secondary Insurance:     DISCHARGE DETAILS:    Discharge planning discussed with:: Patient  Freedom of Choice: Yes  Comments - Freedom of Choice: Discussed FOC  CM contacted family/caregiver?: Yes  Were Treatment Team discharge recommendations reviewed with patient/caregiver?: Yes  Did patient/caregiver verbalize understanding of patient care needs?: N/A- going to facility       Contacts  Patient Contacts: Guzman  Relationship to Patient:: Family  Contact Method: Phone  Phone Number: [phone]  Reason/Outcome: Discharge Planning, Continuity of Care              Other Referral/Resources/Interventions Provided:  Referral Comments: Transportation confirmed via roundtrip.  Spoke to pt at bedside notified of dc to rehab - verbalized understanding.  Called pt's brother Guzman - notified of dc today to rehab - verbalized understanding & in agreement w/dcp & accepting facility.               Transport at Discharge : [facility]  Dispatcher Contacted: Yes  Number/Name of Dispatcher: [phone]  Transported by (Company and Unit #): [facility]  ETA of Transport (Date): 04/15/24  ETA of  Transport (Time): 1645              IMM Given (Date):: 04/15/24  IMM Given to:: Family  Family notified:: Pt's brother Guzman       Accepting Facility Name, City & State : [facility]  Receiving Facility/Agency Phone Number: [phone]  Facility/Agency Fax Number: [phone]       IMM reviewed with Patient/Family who express understanding and agreement with discharge plan

## 2024-04-15 NOTE — NURSING NOTE
Pt cleared for discharge to [facility]. D/C instructions reviewed with and faxed to facility. Report called to Karla cifuentes and Betsy removed. No belongings found in room. Pt transported to facility via stretcher.

## 2024-04-16 LAB
2HR DELTA HS TROPONIN: 0 NG/L
ATRIAL RATE: 61 BPM
ATRIAL RATE: 63 BPM
BACTERIA UR CULT: ABNORMAL
CARDIAC TROPONIN I PNL SERPL HS: 4 NG/L
P AXIS: 54 DEGREES
P AXIS: 66 DEGREES
PR INTERVAL: 160 MS
PR INTERVAL: 164 MS
QRS AXIS: 58 DEGREES
QRS AXIS: 61 DEGREES
QRSD INTERVAL: 86 MS
QRSD INTERVAL: 90 MS
QT INTERVAL: 414 MS
QT INTERVAL: 426 MS
QTC INTERVAL: 423 MS
QTC INTERVAL: 428 MS
T WAVE AXIS: 45 DEGREES
T WAVE AXIS: 54 DEGREES
VENTRICULAR RATE: 61 BPM
VENTRICULAR RATE: 63 BPM

## 2024-04-16 PROCEDURE — 36415 COLL VENOUS BLD VENIPUNCTURE: CPT | Performed by: STUDENT IN AN ORGANIZED HEALTH CARE EDUCATION/TRAINING PROGRAM

## 2024-04-16 PROCEDURE — 93010 ELECTROCARDIOGRAM REPORT: CPT | Performed by: INTERNAL MEDICINE

## 2024-04-16 PROCEDURE — 84484 ASSAY OF TROPONIN QUANT: CPT | Performed by: STUDENT IN AN ORGANIZED HEALTH CARE EDUCATION/TRAINING PROGRAM

## 2024-04-16 PROCEDURE — 93005 ELECTROCARDIOGRAM TRACING: CPT

## 2024-04-16 NOTE — UTILIZATION REVIEW
NOTIFICATION OF ADMISSION DISCHARGE   This is a Notification of Discharge from [facility]. Please be advised that this patient has been discharge from our facility. Below you will find the admission and discharge date and time including the patient’s disposition.   UTILIZATION REVIEW CONTACT:  Lester Desai  Utilization   Network Utilization Review Department  Phone: [phone] x carefully listen to the prompts. All voicemails are confidential.  Email: [email or web address]     ADMISSION INFORMATION  PRESENTATION DATE: 4/11/2024 11:00 PM  OBERVATION ADMISSION DATE:   INPATIENT ADMISSION DATE: 4/12/2  3:38 AM   DISCHARGE DATE: 4/15/2024  6:10 PM   DISPOSITION:Home/Self Care    Network Utilization Review Department  ATTENTION: Please call with any questions or concerns to [phone] and carefully listen to the prompts so that you are directed to the right person. All voicemails are confidential.   For Discharge needs, contact Care Management DC Support Team at [phone] opt. 2  Send all requests for admission clinical reviews, approved or denied determinations and any other requests to dedicated fax number below belonging to the campus where the patient is receiving treatment. List of dedicated fax numbers for the Facilities:  FACILITY NAME UR FAX NUMBER   ADMISSION DENIALS (Administrative/Medical Necessity) [phone]   DISCHARGE SUPPORT TEAM ([facility]) [phone]   PARENT CHILD HEALTH (Maternity/NICU/Pediatrics) [phone]   [facility] [phone]   [facility] [phone]   [facility] [phone]   [facility] [phone]   [facility] [phone]   [facility] [phone]   [facility] [phone]   [facility] [phone]   [facility]  [facility] [phone]   [facility] [phone]   [facility] [phone]   [facility] [phone]

## 2024-04-16 NOTE — DISCHARGE INSTRUCTIONS
Please schedule an appointment with your PCP for follow up as soon as possible     Continue to take your medications as prescribed     Call your doctor or return to the ER if you experience any more episodes of chest pain, shortness of breath, or any other concerning symptoms.

## 2024-04-16 NOTE — ED ATTENDING ATTESTATION
4/15/2024  I, Flavio Montesinos DO, saw and evaluated the patient. I have discussed the patient with the resident/non-physician practitioner and agree with the resident's/non-physician practitioner's findings, Plan of Care, and MDM as documented in the resident's/non-physician practitioner's note, except where noted. All available labs and Radiology studies were reviewed.  I was present for key portions of any procedure(s) performed by the resident/non-physician practitioner and I was immediately available to provide assistance.       At this point I agree with the current assessment done in the Emergency Department.  I have conducted an independent evaluation of this patient a history and physical is as follows:    73-year-old male with significant past medical history including diabetes, neurogenic bladder, MS, presents for evaluation of chest pain which started last night and then resolved and then returned again this afternoon.  He initially states that his pain had subsided however states that his pain has not subsided when asked again.  Patient denies radiation of pain denies associated diaphoresis, vomiting, leg pain or swelling, dyspnea.  No other complaints this time.    Impression: Chest pain likely noncardiac however patient is at risk factors for ACS plan: Cardiac workup, reassess.      ED Course         Critical Care Time  Procedures

## 2024-04-16 NOTE — ED PROVIDER NOTES
History  Chief Complaint   Patient presents with    Chest Pain     Pt called 911 from [facility] c/o 10/10 crushing chest pain that radiates to L arm and jaw. EKG from EMS showed some T wave elevations. EKG sinus rhythm during triage.     72 yo M w/PMHx as listed below, presents for evaluation of sudden onset L sided chest pain that radiated into his jaw. No associated SOB, diaphoresis, or N/V. Pt is pain free on exam, no complaints.         Prior to Admission Medications   Prescriptions Last Dose Informant Patient Reported? Taking?   Empagliflozin (JARDIANCE) 10 MG TABS tablet   Yes No   Sig: Take 10 mg by mouth every morning   Ergocalciferol (VITAMIN D2 PO)  Self Yes No   Sig: Take 50,000 Units by mouth once a week   acetaminophen (TYLENOL) 325 mg tablet   No No   Sig: Take 2 tablets (650 mg total) by mouth every 6 (six) hours as needed for mild pain   acetaminophen (TYLENOL) 325 mg tablet   No No   Sig: Take 3 tablets (975 mg total) by mouth every 8 (eight) hours   albuterol (2.5 mg/3 mL) 0.083 % nebulizer solution   No No   Sig: Take 3 mL (2.5 mg total) by nebulization every 6 (six) hours as needed for wheezing or shortness of breath   amLODIPine-atorvastatin (CADUET) 10-80 MG per tablet   Yes No   Sig: Take 1 tablet by mouth daily   baclofen 10 mg tablet   Yes No   Sig: Take 5 mg by mouth 3 (three) times a day   bisacodyl (DULCOLAX) 10 mg suppository   Yes No   Sig: Insert 10 mg into the rectum daily as needed for constipation   budesonide-formoterol (SYMBICORT) 160-4.5 mcg/act inhaler  Outside Facility (Specify) Yes No   Sig: Inhale 2 puffs 2 (two) times a day Rinse mouth after use.   cefpodoxime (VANTIN) 200 mg tablet   No No   Sig: Take 1 tablet (200 mg total) by mouth 2 (two) times a day for 4 days   clopidogrel (PLAVIX) 75 mg tablet  Self No No   Sig: Take 1 tablet (75 mg total) by mouth daily   cyanocobalamin (VITAMIN B-12) 500 MCG tablet   No No   Sig: Take 1 tablet (500 mcg total) by mouth daily    gabapentin (NEURONTIN) 300 mg capsule  Self No No   Sig: Take 1 capsule (300 mg total) by mouth 2 (two) times a day   gabapentin (NEURONTIN) 300 mg capsule   No No   Sig: Take 2 capsules (600 mg total) by mouth daily at bedtime   latanoprost (XALATAN) 0.005 % ophthalmic solution   Yes No   Sig: Administer 1 drop to both eyes daily at bedtime   lidocaine (LIDODERM) 5 %   No No   Sig: Apply 1 patch topically over 12 hours daily Remove & Discard patch within 12 hours or as directed by MD   melatonin 3 mg  Self Yes No   Sig: Take 3 mg by mouth daily at bedtime as needed   metFORMIN (GLUCOPHAGE) 1000 MG tablet   Yes No   Sig: Take 1,000 mg by mouth 2 (two) times a day with meals   methenamine hippurate (HIPREX) 1 g tablet   No No   Sig: Take 1 tablet (1 g total) by mouth 2 (two) times a day with meals   mirtazapine (REMERON) 7.5 MG tablet  Outside Facility (Specify) Yes No   Sig: Take 7.5 mg by mouth daily at bedtime   pantoprazole (PROTONIX) 40 mg tablet   Yes No   Sig: Take 40 mg by mouth daily   polyethylene glycol (MIRALAX) 17 g packet   No No   Sig: Take 17 g by mouth daily   propranolol (INDERAL LA) 60 mg 24 hr capsule   Yes No   Sig: Take 60 mg by mouth daily   senna-docusate sodium (SENOKOT S) 8.6-50 mg per tablet   Yes No   Sig: Take 2 tablets by mouth daily at bedtime      Facility-Administered Medications: None       Past Medical History:   Diagnosis Date    Acute laryngitis     Acute nonsuppurative otitis media, unspecified laterality     Arm weakness     Arthritis     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HCC)     Bladder infection     Bronchitis     Constipation     Cough     Diabetes (HCC)     Diabetes mellitus (HCC)     Dizziness     Dysfunction of eustachian tube     Erectile dysfunction of non-organic origin     Fatigue     Glaucoma     Hiatal hernia     Hypertension     Imbalance     Leg muscle spasm     MS (multiple sclerosis) (HCC)     Multiple sclerosis (HCC)     Nephrolithiasis     Neurogenic bladder     No  natural teeth     Sinus pain     Spinal stenosis     Strain of thoracic region     Stroke (HCC)     Suprapubic catheter (HCC)        Past Surgical History:   Procedure Laterality Date    APPENDECTOMY      BRAIN SURGERY      Coil placed in aneurysm    CEREBRAL ANEURYSM REPAIR      CYSTOSCOPY      CYSTOSCOPY      CYSTOSCOPY  2018    CYSTOSCOPY  01/15/2021    EYE SURGERY      transscleral cyclophotocoagulation noncontact YAG laser    IR SUPRAPUBIC CATHETER CHECK/CHANGE/REINSERTION/UPSIZE  3/28/2024    MYRINGOTOMY      with ventilation tube insertion    GA LITHOLAPAXY SMPL/SM <2.5 CM N/A 2019    Procedure: CYSTOSCOPY, holmium laser litholapaxy of bladder stones, EXCHANGE OF SP TUBE;  Surgeon: Javy Jeffries MD;  Location: [facility];  Service: Urology    SUPRAPUBIC CATHETER INSERTION         Family History   Problem Relation Age of Onset    Heart attack Mother     Stroke Mother     Heart attack Father     Anuerysm Father         In Stomach     Aneurysm Father      I have reviewed and agree with the history as documented.    E-Cigarette/Vaping    E-Cigarette Use Never User      E-Cigarette/Vaping Substances    Nicotine No     THC No     CBD No     Flavoring No     Other No     Unknown No      Social History     Tobacco Use    Smoking status: Former     Current packs/day: 0.00     Average packs/day: 0.5 packs/day for 31.0 years (15.5 ttl pk-yrs)     Types: Cigarettes     Start date:      Quit date:      Years since quittin.3    Smokeless tobacco: Never   Vaping Use    Vaping status: Never Used   Substance Use Topics    Alcohol use: Not Currently    Drug use: No        Review of Systems   Cardiovascular:  Positive for chest pain.       Physical Exam  ED Triage Vitals [04/15/24 2227]   Temp Pulse Respirations Blood Pressure SpO2   -- 63 14 147/66 97 %      Temp src Heart Rate Source Patient Position - Orthostatic VS BP Location FiO2 (%)   -- Monitor Lying Right arm --      Pain Score       --              Orthostatic Vital Signs  Vitals:    04/15/24 2227   BP: 147/66   Pulse: 63   Patient Position - Orthostatic VS: Lying       Physical Exam  Vitals reviewed.   Constitutional:       Appearance: He is well-developed and normal weight.   HENT:      Head: Normocephalic and atraumatic.   Eyes:      Extraocular Movements: Extraocular movements intact.      Pupils: Pupils are equal, round, and reactive to light.   Cardiovascular:      Rate and Rhythm: Normal rate and regular rhythm.      Heart sounds: Normal heart sounds.   Pulmonary:      Effort: Pulmonary effort is normal.      Breath sounds: Normal breath sounds.   Abdominal:      General: Bowel sounds are normal.      Palpations: Abdomen is soft.   Musculoskeletal:         General: Normal range of motion.      Cervical back: Normal range of motion and neck supple.   Skin:     General: Skin is warm and dry.      Capillary Refill: Capillary refill takes less than 2 seconds.   Neurological:      General: No focal deficit present.      Mental Status: He is alert and oriented to person, place, and time.         ED Medications  Medications - No data to display    Diagnostic Studies  Results Reviewed       Procedure Component Value Units Date/Time    HS Troponin I 2hr [[id number]]  (Normal) Collected: 04/16/24 0101    Lab Status: Final result Specimen: Blood from Arm, Left Updated: 04/16/24 0159     hs TnI 2hr 4 ng/L      Delta 2hr hsTnI 0 ng/L     Comprehensive metabolic panel [[id number]]  (Abnormal) Collected: 04/15/24 2254    Lab Status: Final result Specimen: Blood from Arm, Left Updated: 04/15/24 2343     Sodium 138 mmol/L      Potassium 4.1 mmol/L      Chloride 106 mmol/L      CO2 25 mmol/L      ANION GAP 7 mmol/L      BUN 12 mg/dL      Creatinine 0.77 mg/dL      Glucose 157 mg/dL      Calcium 9.4 mg/dL      Corrected Calcium 9.9 mg/dL      AST 14 U/L      ALT 16 U/L      Alkaline Phosphatase 71 U/L      Total Protein 6.7 g/dL      Albumin 3.4 g/dL      Total  Bilirubin 0.30 mg/dL      eGFR 89 ml/min/1.73sq m     Narrative:      National Kidney Disease Foundation guidelines for Chronic Kidney Disease (CKD):     Stage 1 with normal or high GFR (GFR > 90 mL/min/1.73 square meters)    Stage 2 Mild CKD (GFR = 60-89 mL/min/1.73 square meters)    Stage 3A Moderate CKD (GFR = 45-59 mL/min/1.73 square meters)    Stage 3B Moderate CKD (GFR = 30-44 mL/min/1.73 square meters)    Stage 4 Severe CKD (GFR = 15-29 mL/min/1.73 square meters)    Stage 5 End Stage CKD (GFR <15 mL/min/1.73 square meters)  Note: GFR calculation is accurate only with a steady state creatinine    Lipase [[id number]]  (Normal) Collected: 04/15/24 2254    Lab Status: Final result Specimen: Blood from Arm, Left Updated: 04/15/24 2343     Lipase 22 u/L     HS Troponin 0hr (reflex protocol) [[id number]]  (Normal) Collected: 04/15/24 2254    Lab Status: Final result Specimen: Blood from Arm, Left Updated: 04/15/24 2326     hs TnI 0hr 4 ng/L     CBC and differential [[id number]]  (Abnormal) Collected: 04/15/24 2254    Lab Status: Final result Specimen: Blood from Arm, Left Updated: 04/15/24 2305     WBC 12.87 Thousand/uL      RBC 4.57 Million/uL      Hemoglobin 13.7 g/dL      Hematocrit 41.8 %      MCV 92 fL      MCH 30.0 pg      MCHC 32.8 g/dL      RDW 14.6 %      MPV 8.4 fL      Platelets 334 Thousands/uL      nRBC 0 /100 WBCs      Segmented % 59 %      Immature Grans % 1 %      Lymphocytes % 26 %      Monocytes % 6 %      Eosinophils Relative 7 %      Basophils Relative 1 %      Absolute Neutrophils 7.73 Thousands/µL      Absolute Immature Grans 0.06 Thousand/uL      Absolute Lymphocytes 3.30 Thousands/µL      Absolute Monocytes 0.78 Thousand/µL      Eosinophils Absolute 0.85 Thousand/µL      Basophils Absolute 0.15 Thousands/µL                    X-ray chest 2 views   Final Result by Kasandra Downs MD (04/16 1048)      No acute cardiopulmonary disease.            Workstation performed: [id number]                Procedures  Procedures      ED Course             HEART Risk Score      Flowsheet Row Most Recent Value   Heart Score Risk Calculator    History 2 Filed at: 04/16/2024 0132   ECG 0 Filed at: 04/16/2024 0132   Age 2 Filed at: 04/16/2024 0132   Risk Factors 2 Filed at: 04/16/2024 0132   Troponin 0 Filed at: 04/16/2024 0132   HEART Score 6 Filed at: 04/16/2024 0132                                  Medical Decision Making  ACS workup, plans pending     See sign out note for final disposition     Amount and/or Complexity of Data Reviewed  Labs: ordered.  Radiology: ordered.          Disposition  Final diagnoses:   Chest pain, unspecified type     Time reflects when diagnosis was documented in both MDM as applicable and the Disposition within this note       Time User Action Codes Description Comment    4/16/2024  1:32 AM Sandro Nicholas Add [R07.9] Chest pain, unspecified type           ED Disposition       ED Disposition   Discharge    Condition   Stable    Date/Time   Tue Apr 16, 2024 0210    Comment   Mathew Rico discharge to home/self care.                   Follow-up Information       Follow up With Specialties Details Why Contact Info    Carolina Kumari MD Palliative Care   [address] [place] [address] [id number] [phone]              Discharge Medication List as of 4/16/2024  2:10 AM        CONTINUE these medications which have NOT CHANGED    Details   !! acetaminophen (TYLENOL) 325 mg tablet Take 2 tablets (650 mg total) by mouth every 6 (six) hours as needed for mild pain, Starting Fri 3/29/2024, No Print      !! acetaminophen (TYLENOL) 325 mg tablet Take 3 tablets (975 mg total) by mouth every 8 (eight) hours, Starting Tue 4/2/2024, No Print      albuterol (2.5 mg/3 mL) 0.083 % nebulizer solution Take 3 mL (2.5 mg total) by nebulization every 6 (six) hours as needed for wheezing or shortness of breath, Starting Tue 10/3/2023, Normal      amLODIPine-atorvastatin (CADUET) 10-80 MG per tablet Take 1  tablet by mouth daily, Historical Med      baclofen 10 mg tablet Take 5 mg by mouth 3 (three) times a day, Historical Med      bisacodyl (DULCOLAX) 10 mg suppository Insert 10 mg into the rectum daily as needed for constipation, Historical Med      budesonide-formoterol (SYMBICORT) 160-4.5 mcg/act inhaler Inhale 2 puffs 2 (two) times a day Rinse mouth after use., Historical Med      cefpodoxime (VANTIN) 200 mg tablet Take 1 tablet (200 mg total) by mouth 2 (two) times a day for 4 days, Starting Mon 4/15/2024, Until Fri 4/19/2024, No Print      clopidogrel (PLAVIX) 75 mg tablet Take 1 tablet (75 mg total) by mouth daily, Starting Sat 10/27/2018, No Print      cyanocobalamin (VITAMIN B-12) 500 MCG tablet Take 1 tablet (500 mcg total) by mouth daily, Starting Tue 4/2/2024, No Print      Empagliflozin (JARDIANCE) 10 MG TABS tablet Take 10 mg by mouth every morning, Historical Med      Ergocalciferol (VITAMIN D2 PO) Take 50,000 Units by mouth once a week, Historical Med      !! gabapentin (NEURONTIN) 300 mg capsule Take 1 capsule (300 mg total) by mouth 2 (two) times a day, Starting Thu 6/3/2021, Normal      !! gabapentin (NEURONTIN) 300 mg capsule Take 2 capsules (600 mg total) by mouth daily at bedtime, Starting Thu 6/3/2021, Normal      latanoprost (XALATAN) 0.005 % ophthalmic solution Administer 1 drop to both eyes daily at bedtime, Historical Med      lidocaine (LIDODERM) 5 % Apply 1 patch topically over 12 hours daily Remove & Discard patch within 12 hours or as directed by MD, Starting Wed 4/3/2024, No Print      melatonin 3 mg Take 3 mg by mouth daily at bedtime as needed, Historical Med      metFORMIN (GLUCOPHAGE) 1000 MG tablet Take 1,000 mg by mouth 2 (two) times a day with meals, Historical Med      methenamine hippurate (HIPREX) 1 g tablet Take 1 tablet (1 g total) by mouth 2 (two) times a day with meals, Starting Wed 3/27/2024, Normal      mirtazapine (REMERON) 7.5 MG tablet Take 7.5 mg by mouth daily at  bedtime, Historical Med      pantoprazole (PROTONIX) 40 mg tablet Take 40 mg by mouth daily, Historical Med      polyethylene glycol (MIRALAX) 17 g packet Take 17 g by mouth daily, Starting Wed 2/21/2024, No Print      propranolol (INDERAL LA) 60 mg 24 hr capsule Take 60 mg by mouth daily, Historical Med      senna-docusate sodium (SENOKOT S) 8.6-50 mg per tablet Take 2 tablets by mouth daily at bedtime, Historical Med       !! - Potential duplicate medications found. Please discuss with provider.        No discharge procedures on file.    PDMP Review         Value Time User    PDMP Reviewed  Yes 4/1/2024  3:21 AM Ar Beltran,              ED Provider  Attending physically available and evaluated Mathew Rico. I managed the patient along with the ED Attending.    Electronically Signed by           Sandro Nicholas MD  04/16/24 0927

## 2024-04-17 LAB
BACTERIA BLD CULT: NORMAL
BACTERIA BLD CULT: NORMAL

## 2024-04-23 LAB
ATRIAL RATE: 83 BPM
P AXIS: 72 DEGREES
PR INTERVAL: 162 MS
QRS AXIS: 67 DEGREES
QRSD INTERVAL: 90 MS
QT INTERVAL: 370 MS
QTC INTERVAL: 434 MS
T WAVE AXIS: 51 DEGREES
VENTRICULAR RATE: 83 BPM

## 2024-05-01 PROBLEM — A41.9 SEPSIS (HCC): Status: RESOLVED | Noted: 2021-06-02 | Resolved: 2024-05-01

## 2024-05-03 ENCOUNTER — TELEPHONE (OUTPATIENT)
Dept: UROLOGY | Facility: AMBULATORY SURGERY CENTER | Age: 74
End: 2024-05-03

## 2024-05-03 NOTE — TELEPHONE ENCOUNTER
Adrien's message:  I reached out to the facility to confirm however was placed on hold for a lengthy period of time. I will call back at a later time today.

## 2024-05-06 NOTE — TELEPHONE ENCOUNTER
Verbally confirmed the below information with Jeannine at the facility:    Date: 5/9/2024     Arrival Time: 11:30 AM     Visit Type: URO CYSTO SIMPLE PROCEDURE [facility] [[id number]]     Provider: Javy Jeffries MD Department: [facility]

## 2024-05-09 ENCOUNTER — PROCEDURE VISIT (OUTPATIENT)
Dept: UROLOGY | Facility: AMBULATORY SURGERY CENTER | Age: 74
End: 2024-05-09
Payer: MEDICARE

## 2024-05-09 VITALS — SYSTOLIC BLOOD PRESSURE: 140 MMHG | DIASTOLIC BLOOD PRESSURE: 62 MMHG | OXYGEN SATURATION: 98 % | HEART RATE: 71 BPM

## 2024-05-09 DIAGNOSIS — N31.9 NEUROGENIC BLADDER: Primary | ICD-10-CM

## 2024-05-09 PROCEDURE — 52000 CYSTOURETHROSCOPY: CPT | Performed by: UROLOGY

## 2024-05-09 NOTE — PROGRESS NOTES
Cystoscopy     Date/Time  5/9/2024 11:45 AM     Performed by  Javy Jeffries MD   Authorized by  Javy Jeffries MD         Mathew is a 73-year-old male with history of multiple sclerosis and neurogenic bladder.  In 2014 I performed a TURP but he was never able to urinate.  More recently he has had issues with the suprapubic tube clogging.  In 2019 I did perform cystolitholapaxy.  He returns to the office today for routine cystoscopy and suprapubic tube exchange.  He states that he lives at home with his siblings.  He does have VNA.    Patient was placed supine on the stretcher.  The suprapubic tube was removed.  The tract was prepped and draped in sterile fashion.  Lidocaine was instilled and cystoscopy was performed.  The bladder appeared relatively normal with good capacity.  Prostate was enlarged.  Ureteral orifice ease were normal.  The bladder neck was not passed with the scope.  There was debris within the bladder.  The bladder was filled and the cystoscope was removed.  A 24 French suprapubic tube was inserted.  10 cc were placed in the balloon.  The bladder was easily irrigated.  The tube was connected to gravity drainage.    Impression: Neurogenic bladder, BPH, status post TURP, history of multiple sclerosis    Plan: A new 24 French suprapubic tube was inserted without difficulty today.  There were no significant bladder stones or debris within the bladder requiring any additional surgical intervention.  I recommend maintaining the suprapubic tube to gravity and manually irrigating it as needed even up to daily if required.  Follow-up in 6 months is recommended.

## 2024-05-09 NOTE — LETTER
May 9, 2024     Carolina Kumari MD  [address] [facility] [id number]    Patient: Mathew Rico   YOB: 1950   Date of Visit: 5/9/2024       Dear Dr. Kumari:    Thank you for referring Mathew Rico to me for evaluation. Below are my notes for this consultation.    If you have questions, please do not hesitate to call me. I look forward to following your patient along with you.         Sincerely,        Javy Jeffries MD        CC: No Recipients    Javy Jeffries MD  5/9/2024 12:13 PM  Sign when Signing Visit     Cystoscopy     Date/Time  5/9/2024 11:45 AM     Performed by  Javy Jeffries MD   Authorized by  Javy Jeffries MD         Mathew is a 73-year-old male with history of multiple sclerosis and neurogenic bladder.  In 2014 I performed a TURP but he was never able to urinate.  More recently he has had issues with the suprapubic tube clogging.  In 2019 I did perform cystolitholapaxy.  He returns to the office today for routine cystoscopy and suprapubic tube exchange.  He states that he lives at home with his siblings.  He does have VNA.    Patient was placed supine on the stretcher.  The suprapubic tube was removed.  The tract was prepped and draped in sterile fashion.  Lidocaine was instilled and cystoscopy was performed.  The bladder appeared relatively normal with good capacity.  Prostate was enlarged.  Ureteral orifice ease were normal.  The bladder neck was not passed with the scope.  There was debris within the bladder.  The bladder was filled and the cystoscope was removed.  A 24 [place]e suprapubic tube was inserted.  10 cc were placed in the balloon.  The bladder was easily irrigated.  The tube was connected to gravity drainage.    Impression: Neurogenic bladder, BPH, status post TURP, history of multiple sclerosis    Plan: A new 24 [place]e suprapubic tube was inserted without difficulty today.  There were no significant bladder stones or debris  within the bladder requiring any additional surgical intervention.  I recommend maintaining the suprapubic tube to gravity and manually irrigating it as needed even up to daily if required.  Follow-up in 6 months is recommended.

## 2024-05-13 PROBLEM — N39.0 URINARY TRACT INFECTION ASSOCIATED WITH CYSTOSTOMY CATHETER  (HCC): Status: RESOLVED | Noted: 2024-02-15 | Resolved: 2024-05-13

## 2024-05-13 PROBLEM — T83.510A URINARY TRACT INFECTION ASSOCIATED WITH CYSTOSTOMY CATHETER  (HCC): Status: RESOLVED | Noted: 2024-02-15 | Resolved: 2024-05-13

## 2024-05-15 ENCOUNTER — APPOINTMENT (EMERGENCY)
Dept: RADIOLOGY | Facility: HOSPITAL | Age: 74
End: 2024-05-15
Payer: MEDICARE

## 2024-05-15 ENCOUNTER — HOSPITAL ENCOUNTER (EMERGENCY)
Facility: HOSPITAL | Age: 74
Discharge: HOME | End: 2024-05-15
Attending: EMERGENCY MEDICINE
Payer: MEDICARE

## 2024-05-15 VITALS
SYSTOLIC BLOOD PRESSURE: 180 MMHG | HEART RATE: 76 BPM | OXYGEN SATURATION: 98 % | TEMPERATURE: 97.8 F | DIASTOLIC BLOOD PRESSURE: 81 MMHG | RESPIRATION RATE: 18 BRPM

## 2024-05-15 DIAGNOSIS — R07.9 CHEST PAIN WITH LOW RISK OF ACUTE CORONARY SYNDROME: Primary | ICD-10-CM

## 2024-05-15 LAB
ALBUMIN SERPL BCP-MCNC: 3.8 G/DL (ref 3.5–5)
ALP SERPL-CCNC: 94 U/L (ref 34–104)
ALT SERPL W P-5'-P-CCNC: 10 U/L (ref 7–52)
ANION GAP SERPL CALCULATED.3IONS-SCNC: 5 MMOL/L (ref 4–13)
AST SERPL W P-5'-P-CCNC: 10 U/L (ref 13–39)
ATRIAL RATE: 72 BPM
BACTERIA UR QL AUTO: ABNORMAL /HPF
BASOPHILS # BLD AUTO: 0.1 THOUSANDS/ÂΜL (ref 0–0.1)
BASOPHILS NFR BLD AUTO: 1 % (ref 0–1)
BILIRUB SERPL-MCNC: 0.48 MG/DL (ref 0.2–1)
BILIRUB UR QL STRIP: NEGATIVE
BUDDING YEAST: PRESENT
BUN SERPL-MCNC: 13 MG/DL (ref 5–25)
CALCIUM SERPL-MCNC: 9.3 MG/DL (ref 8.4–10.2)
CARDIAC TROPONIN I PNL SERPL HS: 4 NG/L
CHLORIDE SERPL-SCNC: 104 MMOL/L (ref 96–108)
CLARITY UR: ABNORMAL
CO2 SERPL-SCNC: 28 MMOL/L (ref 21–32)
COLOR UR: YELLOW
CREAT SERPL-MCNC: 0.76 MG/DL (ref 0.6–1.3)
EOSINOPHIL # BLD AUTO: 0.76 THOUSAND/ÂΜL (ref 0–0.61)
EOSINOPHIL NFR BLD AUTO: 7 % (ref 0–6)
ERYTHROCYTE [DISTWIDTH] IN BLOOD BY AUTOMATED COUNT: 14.2 % (ref 11.6–15.1)
GFR SERPL CREATININE-BSD FRML MDRD: 89 ML/MIN/1.73SQ M
GLUCOSE SERPL-MCNC: 125 MG/DL (ref 65–140)
GLUCOSE UR STRIP-MCNC: ABNORMAL MG/DL
HCT VFR BLD AUTO: 40.7 % (ref 36.5–49.3)
HGB BLD-MCNC: 12.8 G/DL (ref 12–17)
HGB UR QL STRIP.AUTO: ABNORMAL
HYPHAE YEAST: PRESENT
IMM GRANULOCYTES # BLD AUTO: 0.04 THOUSAND/UL (ref 0–0.2)
IMM GRANULOCYTES NFR BLD AUTO: 0 % (ref 0–2)
KETONES UR STRIP-MCNC: NEGATIVE MG/DL
LEUKOCYTE ESTERASE UR QL STRIP: NEGATIVE
LYMPHOCYTES # BLD AUTO: 2.57 THOUSANDS/ÂΜL (ref 0.6–4.47)
LYMPHOCYTES NFR BLD AUTO: 22 % (ref 14–44)
MCH RBC QN AUTO: 29.5 PG (ref 26.8–34.3)
MCHC RBC AUTO-ENTMCNC: 31.4 G/DL (ref 31.4–37.4)
MCV RBC AUTO: 94 FL (ref 82–98)
MONOCYTES # BLD AUTO: 0.89 THOUSAND/ÂΜL (ref 0.17–1.22)
MONOCYTES NFR BLD AUTO: 8 % (ref 4–12)
NEUTROPHILS # BLD AUTO: 7.4 THOUSANDS/ÂΜL (ref 1.85–7.62)
NEUTS SEG NFR BLD AUTO: 62 % (ref 43–75)
NITRITE UR QL STRIP: POSITIVE
NON-SQ EPI CELLS URNS QL MICRO: ABNORMAL /HPF
NRBC BLD AUTO-RTO: 0 /100 WBCS
P AXIS: 66 DEGREES
PH UR STRIP.AUTO: 6 [PH] (ref 4.5–8)
PLATELET # BLD AUTO: 252 THOUSANDS/UL (ref 149–390)
PMV BLD AUTO: 8.6 FL (ref 8.9–12.7)
POTASSIUM SERPL-SCNC: 3.7 MMOL/L (ref 3.5–5.3)
PR INTERVAL: 172 MS
PROT SERPL-MCNC: 7.2 G/DL (ref 6.4–8.4)
PROT UR STRIP-MCNC: NEGATIVE MG/DL
QRS AXIS: 66 DEGREES
QRSD INTERVAL: 82 MS
QT INTERVAL: 378 MS
QTC INTERVAL: 413 MS
RBC # BLD AUTO: 4.34 MILLION/UL (ref 3.88–5.62)
RBC #/AREA URNS AUTO: ABNORMAL /HPF
SODIUM SERPL-SCNC: 137 MMOL/L (ref 135–147)
SP GR UR STRIP.AUTO: >=1.03 (ref 1–1.03)
T WAVE AXIS: 56 DEGREES
UROBILINOGEN UR QL STRIP.AUTO: 0.2 E.U./DL
VENTRICULAR RATE: 72 BPM
WBC # BLD AUTO: 11.76 THOUSAND/UL (ref 4.31–10.16)
WBC #/AREA URNS AUTO: ABNORMAL /HPF
WBC CLUMPS # UR AUTO: PRESENT /UL

## 2024-05-15 PROCEDURE — 99284 EMERGENCY DEPT VISIT MOD MDM: CPT | Performed by: EMERGENCY MEDICINE

## 2024-05-15 PROCEDURE — C9113 INJ PANTOPRAZOLE SODIUM, VIA: HCPCS | Performed by: EMERGENCY MEDICINE

## 2024-05-15 PROCEDURE — 87106 FUNGI IDENTIFICATION YEAST: CPT

## 2024-05-15 PROCEDURE — 87077 CULTURE AEROBIC IDENTIFY: CPT

## 2024-05-15 PROCEDURE — 80053 COMPREHEN METABOLIC PANEL: CPT | Performed by: EMERGENCY MEDICINE

## 2024-05-15 PROCEDURE — 96375 TX/PRO/DX INJ NEW DRUG ADDON: CPT

## 2024-05-15 PROCEDURE — 96374 THER/PROPH/DIAG INJ IV PUSH: CPT

## 2024-05-15 PROCEDURE — 71045 X-RAY EXAM CHEST 1 VIEW: CPT

## 2024-05-15 PROCEDURE — 36415 COLL VENOUS BLD VENIPUNCTURE: CPT | Performed by: EMERGENCY MEDICINE

## 2024-05-15 PROCEDURE — 87086 URINE CULTURE/COLONY COUNT: CPT

## 2024-05-15 PROCEDURE — 85025 COMPLETE CBC W/AUTO DIFF WBC: CPT | Performed by: EMERGENCY MEDICINE

## 2024-05-15 PROCEDURE — 81001 URINALYSIS AUTO W/SCOPE: CPT

## 2024-05-15 PROCEDURE — 99285 EMERGENCY DEPT VISIT HI MDM: CPT

## 2024-05-15 PROCEDURE — 93005 ELECTROCARDIOGRAM TRACING: CPT

## 2024-05-15 PROCEDURE — 87186 SC STD MICRODIL/AGAR DIL: CPT

## 2024-05-15 PROCEDURE — 93010 ELECTROCARDIOGRAM REPORT: CPT | Performed by: INTERNAL MEDICINE

## 2024-05-15 PROCEDURE — 84484 ASSAY OF TROPONIN QUANT: CPT | Performed by: EMERGENCY MEDICINE

## 2024-05-15 RX ORDER — OXYMETAZOLINE HYDROCHLORIDE 0.05 G/100ML
1 SPRAY NASAL ONCE
Status: COMPLETED | OUTPATIENT
Start: 2024-05-15 | End: 2024-05-15

## 2024-05-15 RX ORDER — KETOROLAC TROMETHAMINE 30 MG/ML
15 INJECTION, SOLUTION INTRAMUSCULAR; INTRAVENOUS ONCE
Status: COMPLETED | OUTPATIENT
Start: 2024-05-15 | End: 2024-05-15

## 2024-05-15 RX ORDER — PANTOPRAZOLE SODIUM 40 MG/10ML
40 INJECTION, POWDER, LYOPHILIZED, FOR SOLUTION INTRAVENOUS ONCE
Status: COMPLETED | OUTPATIENT
Start: 2024-05-15 | End: 2024-05-15

## 2024-05-15 RX ADMIN — OXYMETAZOLINE HYDROCHLORIDE 1 SPRAY: 0.05 SPRAY NASAL at 10:56

## 2024-05-15 RX ADMIN — PANTOPRAZOLE SODIUM 40 MG: 40 INJECTION, POWDER, FOR SOLUTION INTRAVENOUS at 09:27

## 2024-05-15 RX ADMIN — KETOROLAC TROMETHAMINE 15 MG: 30 INJECTION, SOLUTION INTRAMUSCULAR; INTRAVENOUS at 09:25

## 2024-05-15 NOTE — ED NOTES
Pt complaining of nasal congestion. RN assisted pt with saline in the nose and nasal clearing but pt requesting additional assistance     Marsha Morin RN  05/15/24 [id number]

## 2024-05-15 NOTE — ED PROVIDER NOTES
History  Chief Complaint   Patient presents with    Chest Pain     Pt complains of chest pain that started around 0800 this morning. Pt reports the pain as sharp and stabbing, hard to catch his breath. Pt states this has happened before and this fells similar. MS mendoza. Pt does not know year or date in triage, believes he is at [facility]. Oriented to self and home conditions     73 y.o. M with multiple sclerosis, HTN, CVA, chronically debilitated and bed bound, with SP catheter for neurogenic bladder, brought from home where he lives with his brother and sister, c/o onset of chest pain when he woke up this morning he localizes to mid chest, described sharp, causing him to feel short of breath, without fever, chills, N/V.  He has not eaten this morning.  He has had identical pain to this periodically.  He tells he was just released from a nursing home where he was discharged after a recent hospital admission.         History provided by:  Patient      Prior to Admission Medications   Prescriptions Last Dose Informant Patient Reported? Taking?   Empagliflozin (JARDIANCE) 10 MG TABS tablet  Outside Facility (Specify) Yes No   Sig: Take 10 mg by mouth every morning   Ergocalciferol (VITAMIN D2 PO)  Outside Facility (Specify) Yes No   Sig: Take 50,000 Units by mouth once a week   acetaminophen (TYLENOL) 325 mg tablet  Outside Facility (Specify) No No   Sig: Take 2 tablets (650 mg total) by mouth every 6 (six) hours as needed for mild pain   acetaminophen (TYLENOL) 325 mg tablet  Outside Facility (Specify) No No   Sig: Take 3 tablets (975 mg total) by mouth every 8 (eight) hours   albuterol (2.5 mg/3 mL) 0.083 % nebulizer solution  Outside Facility (Specify) No No   Sig: Take 3 mL (2.5 mg total) by nebulization every 6 (six) hours as needed for wheezing or shortness of breath   amLODIPine-atorvastatin (CADUET) 10-80 MG per tablet  Outside Facility (Specify) Yes No   Sig: Take 1 tablet by mouth daily   baclofen 10 mg tablet   Outside Facility (Specify) Yes No   Sig: Take 5 mg by mouth 3 (three) times a day   bisacodyl (DULCOLAX) 10 mg suppository  Outside Facility (Specify) Yes No   Sig: Insert 10 mg into the rectum daily as needed for constipation   budesonide-formoterol (SYMBICORT) 160-4.5 mcg/act inhaler  Outside Facility (Specify) Yes No   Sig: Inhale 2 puffs 2 (two) times a day Rinse mouth after use.   clopidogrel (PLAVIX) 75 mg tablet  Outside Facility (Specify) No No   Sig: Take 1 tablet (75 mg total) by mouth daily   cyanocobalamin (VITAMIN B-12) 500 MCG tablet  Outside Facility (Specify) No No   Sig: Take 1 tablet (500 mcg total) by mouth daily   gabapentin (NEURONTIN) 300 mg capsule  Outside Facility (Specify) No No   Sig: Take 1 capsule (300 mg total) by mouth 2 (two) times a day   gabapentin (NEURONTIN) 300 mg capsule  Outside Facility (Specify) No No   Sig: Take 2 capsules (600 mg total) by mouth daily at bedtime   latanoprost (XALATAN) 0.005 % ophthalmic solution  Outside Facility (Specify) Yes No   Sig: Administer 1 drop to both eyes daily at bedtime   lidocaine (LIDODERM) 5 %  Outside Facility (Specify) No No   Sig: Apply 1 patch topically over 12 hours daily Remove & Discard patch within 12 hours or as directed by MD   melatonin 3 mg  Outside Facility (Specify) Yes No   Sig: Take 3 mg by mouth daily at bedtime as needed   metFORMIN (GLUCOPHAGE) 1000 MG tablet  Outside Facility (Specify) Yes No   Sig: Take 1,000 mg by mouth 2 (two) times a day with meals   methenamine hippurate (HIPREX) 1 g tablet  Outside Facility (Specify) No No   Sig: Take 1 tablet (1 g total) by mouth 2 (two) times a day with meals   mirtazapine (REMERON) 7.5 MG tablet  Outside Facility (Specify) Yes No   Sig: Take 7.5 mg by mouth daily at bedtime   pantoprazole (PROTONIX) 40 mg tablet  Outside Facility (Specify) Yes No   Sig: Take 40 mg by mouth daily   polyethylene glycol (MIRALAX) 17 g packet  Outside Facility (Specify) No No   Sig: Take 17 g by  mouth daily   propranolol (INDERAL LA) 60 mg 24 hr capsule  Outside Facility (Specify) Yes No   Sig: Take 60 mg by mouth daily   senna-docusate sodium (SENOKOT S) 8.6-50 mg per tablet  Outside Facility (Specify) Yes No   Sig: Take 2 tablets by mouth daily at bedtime      Facility-Administered Medications: None       Past Medical History:   Diagnosis Date    Acute laryngitis     Acute nonsuppurative otitis media, unspecified laterality     Arm weakness     Arthritis     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HCC)     Bladder infection     Bronchitis     Constipation     Cough     Diabetes (HCC)     Diabetes mellitus (HCC)     Dizziness     Dysfunction of eustachian tube     Erectile dysfunction of non-organic origin     Fatigue     Glaucoma     Hiatal hernia     Hypertension     Imbalance     Leg muscle spasm     MS (multiple sclerosis) (HCC)     Multiple sclerosis (HCC)     Nephrolithiasis     Neurogenic bladder     No natural teeth     Sinus pain     Spinal stenosis     Strain of thoracic region     Stroke ([facility])     Suprapubic catheter ([facility])        Past Surgical History:   Procedure Laterality Date    APPENDECTOMY      BRAIN SURGERY      Coil placed in aneurysm    CEREBRAL ANEURYSM REPAIR      CYSTOSCOPY      CYSTOSCOPY      CYSTOSCOPY  06/11/2018    CYSTOSCOPY  01/15/2021    EYE SURGERY      transscleral cyclophotocoagulation noncontact YAG laser    IR SUPRAPUBIC CATHETER CHECK/CHANGE/REINSERTION/UPSIZE  3/28/2024    MYRINGOTOMY      with ventilation tube insertion    MD LITHOLAPAXY SMPL/SM <2.5 CM N/A 5/7/2019    Procedure: CYSTOSCOPY, holmium laser litholapaxy of bladder stones, EXCHANGE OF SP TUBE;  Surgeon: Javy Jeffries MD;  Location: [facility];  Service: Urology    SUPRAPUBIC CATHETER INSERTION         Family History   Problem Relation Age of Onset    Heart attack Mother     Stroke Mother     Heart attack Father     Anuerysm Father         In Stomach     Aneurysm Father      I have reviewed and agree with the history  as documented.    E-Cigarette/Vaping    E-Cigarette Use Never User      E-Cigarette/Vaping Substances    Nicotine No     THC No     CBD No     Flavoring No     Other No     Unknown No      Social History     Tobacco Use    Smoking status: Former     Current packs/day: 0.00     Average packs/day: 0.5 packs/day for 31.0 years (15.5 ttl pk-yrs)     Types: Cigarettes     Start date:      Quit date:      Years since quittin.3    Smokeless tobacco: Never   Vaping Use    Vaping status: Never Used   Substance Use Topics    Alcohol use: Not Currently    Drug use: No       Review of Systems    Physical Exam  Physical Exam  Vitals and nursing note reviewed.   Constitutional:       Appearance: He is well-developed.   HENT:      Head: Normocephalic and atraumatic.      Right Ear: External ear normal.      Left Ear: External ear normal.      Nose: Nose normal.      Mouth/Throat:      Mouth: Mucous membranes are moist.   Eyes:      Conjunctiva/sclera: Conjunctivae normal.      Pupils: Pupils are equal, round, and reactive to light.   Cardiovascular:      Rate and Rhythm: Normal rate.   Pulmonary:      Effort: Pulmonary effort is normal.   Abdominal:      Tenderness: There is no abdominal tenderness.   Musculoskeletal:         General: Normal range of motion.      Cervical back: Normal range of motion and neck supple.   Skin:     General: Skin is warm and dry.      Capillary Refill: Capillary refill takes less than 2 seconds.   Neurological:      Mental Status: He is alert and oriented to person, place, and time.      Cranial Nerves: No cranial nerve deficit.      Coordination: Coordination normal.   Psychiatric:         Behavior: Behavior normal.         Thought Content: Thought content normal.         Judgment: Judgment normal.         Vital Signs  ED Triage Vitals   Temperature Pulse Respirations Blood Pressure SpO2   05/15/24 0853 05/15/24 0851 05/15/24 0851 05/15/24 0851 05/15/24 0851   97.8 °F (36.6 °C) 71 18  154/65 97 %      Temp Source Heart Rate Source Patient Position - Orthostatic VS BP Location FiO2 (%)   05/15/24 0853 05/15/24 0851 05/15/24 0851 05/15/24 0851 --   Oral Monitor Lying Left arm       Pain Score       05/15/24 0851       9           Vitals:    05/15/24 1015 05/15/24 1045 05/15/24 1145 05/15/24 1215   BP: 144/63 (!) 173/79 165/70 (!) 180/81   Pulse: 80 86 78 76   Patient Position - Orthostatic VS: Lying Lying Lying Lying         Visual Acuity      ED Medications  Medications   ketorolac (TORADOL) injection 15 mg (15 mg Intravenous Given 5/15/24 0925)   pantoprazole (PROTONIX) injection 40 mg (40 mg Intravenous Given 5/15/24 0927)   oxymetazoline (AFRIN) 0.05 % nasal spray 1 spray (1 spray Each Nare Given 5/15/24 1056)       Diagnostic Studies  Results Reviewed       Procedure Component Value Units Date/Time    Comprehensive metabolic panel [[id number]]  (Abnormal) Collected: 05/15/24 1109    Lab Status: Final result Specimen: Blood from Arm, Right Updated: 05/15/24 1145     Sodium 137 mmol/L      Potassium 3.7 mmol/L      Chloride 104 mmol/L      CO2 28 mmol/L      ANION GAP 5 mmol/L      BUN 13 mg/dL      Creatinine 0.76 mg/dL      Glucose 125 mg/dL      Calcium 9.3 mg/dL      AST 10 U/L      ALT 10 U/L      Alkaline Phosphatase 94 U/L      Total Protein 7.2 g/dL      Albumin 3.8 g/dL      Total Bilirubin 0.48 mg/dL      eGFR 89 ml/min/1.73sq m     Narrative:      National Kidney Disease Foundation guidelines for Chronic Kidney Disease (CKD):     Stage 1 with normal or high GFR (GFR > 90 mL/min/1.73 square meters)    Stage 2 Mild CKD (GFR = 60-89 mL/min/1.73 square meters)    Stage 3A Moderate CKD (GFR = 45-59 mL/min/1.73 square meters)    Stage 3B Moderate CKD (GFR = 30-44 mL/min/1.73 square meters)    Stage 4 Severe CKD (GFR = 15-29 mL/min/1.73 square meters)    Stage 5 End Stage CKD (GFR <15 mL/min/1.73 square meters)  Note: GFR calculation is accurate only with a steady state creatinine    Urine  Microscopic [[id number]]  (Abnormal) Collected: 05/15/24 1009    Lab Status: Final result Specimen: Urine, Suprapubic catheter Updated: 05/15/24 1040     RBC, UA 10-20 /hpf      WBC, UA Innumerable /hpf      Epithelial Cells None Seen /hpf      Bacteria, UA None Seen /hpf      WBC Clumps Present     Budding Yeast Present     Hyphae Yeast Present    Urine culture [[id number]] Collected: 05/15/24 1009    Lab Status: In process Specimen: Urine, Suprapubic catheter Updated: 05/15/24 1040    Urine Macroscopic, POC [[id number]]  (Abnormal) Collected: 05/15/24 1009    Lab Status: Final result Specimen: Urine Updated: 05/15/24 1011     Color, UA Yellow     Clarity, UA Cloudy     pH, UA 6.0     Leukocytes, UA Negative     Nitrite, UA Positive     Protein, UA Negative mg/dl      Glucose,  (1/2%) mg/dl      Ketones, UA Negative mg/dl      Urobilinogen, UA 0.2 E.U./dl      Bilirubin, UA Negative     Occult Blood, UA Small     Specific Gravity, UA >=1.030    Narrative:      CLINITEK RESULT    HS Troponin 0hr (reflex protocol) [[id number]]  (Normal) Collected: 05/15/24 0908    Lab Status: Final result Specimen: Blood from Hand, Left Updated: 05/15/24 0936     hs TnI 0hr 4 ng/L     CBC and differential [[id number]]  (Abnormal) Collected: 05/15/24 0908    Lab Status: Final result Specimen: Blood from Hand, Left Updated: 05/15/24 0914     WBC 11.76 Thousand/uL      RBC 4.34 Million/uL      Hemoglobin 12.8 g/dL      Hematocrit 40.7 %      MCV 94 fL      MCH 29.5 pg      MCHC 31.4 g/dL      RDW 14.2 %      MPV 8.6 fL      Platelets 252 Thousands/uL      nRBC 0 /100 WBCs      Segmented % 62 %      Immature Grans % 0 %      Lymphocytes % 22 %      Monocytes % 8 %      Eosinophils Relative 7 %      Basophils Relative 1 %      Absolute Neutrophils 7.40 Thousands/µL      Absolute Immature Grans 0.04 Thousand/uL      Absolute Lymphocytes 2.57 Thousands/µL      Absolute Monocytes 0.89 Thousand/µL      Eosinophils Absolute 0.76 Thousand/µL       Basophils Absolute 0.10 Thousands/µL                    XR chest 1 view portable    (Results Pending)              Procedures  Procedures         ED Course  ED Course as of 05/15/24 1455   Wed May 15, 2024   0931 XR chest 1 view portable  My independent interpretation of imaging:   no acute finding   1013 He reports complete resolution of pain   1014 UA is chronically colonized, he has no symptoms of UTI acutely, so I am not inclined to treat             HEART Risk Score      Flowsheet Row Most Recent Value   Heart Score Risk Calculator    History 0 Filed at: 05/15/2024 1201   ECG 0 Filed at: 05/15/2024 1201   Age 2 Filed at: 05/15/2024 1201   Risk Factors 1 Filed at: 05/15/2024 1201   Troponin 0 Filed at: 05/15/2024 1201   HEART Score 3 Filed at: 05/15/2024 1201                          SBIRT 20yo+      Flowsheet Row Most Recent Value   Initial Alcohol Screen: US AUDIT-C     1. How often do you have a drink containing alcohol? 0 Filed at: 05/15/2024 1017   2. How many drinks containing alcohol do you have on a typical day you are drinking?  0 Filed at: 05/15/2024 1017   3a. Male UNDER 65: How often do you have five or more drinks on one occasion? 0 Filed at: 05/15/2024 1017   3b. FEMALE Any Age, or MALE 65+: How often do you have 4 or more drinks on one occassion? 0 Filed at: 05/15/2024 1017   Audit-C Score 0 Filed at: 05/15/2024 1017   ALPESH: How many times in the past year have you...    Used an illegal drug or used a prescription medication for non-medical reasons? Never Filed at: 05/15/2024 1017                      Medical Decision Making  Amount and/or Complexity of Data Reviewed  Labs: ordered.  Radiology: ordered. Decision-making details documented in ED Course.    Risk  OTC drugs.  Prescription drug management.             Disposition  Final diagnoses:   Chest pain with low risk of acute coronary syndrome     Time reflects when diagnosis was documented in both MDM as applicable and the Disposition  within this note       Time User Action Codes Description Comment    5/15/2024 12:02 PM Mike Parr Add [R07.9] Chest pain with low risk of acute coronary syndrome           ED Disposition       ED Disposition   Discharge    Condition   Good    Date/Time   Wed May 15, 2024 1201    Comment   Mathew Rico discharge to home/self care.                   Follow-up Information       Follow up With Specialties Details Why Contact Info    Carolina Kumari MD Palliative Care Call  If symptoms worsen [address] [facility]  [place] [id number] [phone]              Discharge Medication List as of 5/15/2024 12:03 PM        CONTINUE these medications which have NOT CHANGED    Details   !! acetaminophen (TYLENOL) 325 mg tablet Take 2 tablets (650 mg total) by mouth every 6 (six) hours as needed for mild pain, Starting Fri 3/29/2024, No Print      !! acetaminophen (TYLENOL) 325 mg tablet Take 3 tablets (975 mg total) by mouth every 8 (eight) hours, Starting Tue 4/2/2024, No Print      albuterol (2.5 mg/3 mL) 0.083 % nebulizer solution Take 3 mL (2.5 mg total) by nebulization every 6 (six) hours as needed for wheezing or shortness of breath, Starting Tue 10/3/2023, Normal      amLODIPine-atorvastatin (CADUET) 10-80 MG per tablet Take 1 tablet by mouth daily, Historical Med      baclofen 10 mg tablet Take 5 mg by mouth 3 (three) times a day, Historical Med      bisacodyl (DULCOLAX) 10 mg suppository Insert 10 mg into the rectum daily as needed for constipation, Historical Med      budesonide-formoterol (SYMBICORT) 160-4.5 mcg/act inhaler Inhale 2 puffs 2 (two) times a day Rinse mouth after use., Historical Med      clopidogrel (PLAVIX) 75 mg tablet Take 1 tablet (75 mg total) by mouth daily, Starting Sat 10/27/2018, No Print      cyanocobalamin (VITAMIN B-12) 500 MCG tablet Take 1 tablet (500 mcg total) by mouth daily, Starting Tue 4/2/2024, No Print      Empagliflozin (JARDIANCE) 10 MG TABS tablet Take 10 mg by mouth  every morning, Historical Med      Ergocalciferol (VITAMIN D2 PO) Take 50,000 Units by mouth once a week, Historical Med      !! gabapentin (NEURONTIN) 300 mg capsule Take 1 capsule (300 mg total) by mouth 2 (two) times a day, Starting Thu 6/3/2021, Normal      !! gabapentin (NEURONTIN) 300 mg capsule Take 2 capsules (600 mg total) by mouth daily at bedtime, Starting Thu 6/3/2021, Normal      latanoprost (XALATAN) 0.005 % ophthalmic solution Administer 1 drop to both eyes daily at bedtime, Historical Med      lidocaine (LIDODERM) 5 % Apply 1 patch topically over 12 hours daily Remove & Discard patch within 12 hours or as directed by MD, Starting Wed 4/3/2024, No Print      melatonin 3 mg Take 3 mg by mouth daily at bedtime as needed, Historical Med      metFORMIN (GLUCOPHAGE) 1000 MG tablet Take 1,000 mg by mouth 2 (two) times a day with meals, Historical Med      methenamine hippurate (HIPREX) 1 g tablet Take 1 tablet (1 g total) by mouth 2 (two) times a day with meals, Starting Wed 3/27/2024, Normal      mirtazapine (REMERON) 7.5 MG tablet Take 7.5 mg by mouth daily at bedtime, Historical Med      pantoprazole (PROTONIX) 40 mg tablet Take 40 mg by mouth daily, Historical Med      polyethylene glycol (MIRALAX) 17 g packet Take 17 g by mouth daily, Starting Wed 2/21/2024, No Print      propranolol (INDERAL LA) 60 mg 24 hr capsule Take 60 mg by mouth daily, Historical Med      senna-docusate sodium (SENOKOT S) 8.6-50 mg per tablet Take 2 tablets by mouth daily at bedtime, Historical Med       !! - Potential duplicate medications found. Please discuss with provider.          No discharge procedures on file.    PDMP Review         Value Time User    PDMP Reviewed  Yes 4/1/2024  3:21 AM Ar Beltran DO            ED Provider  Electronically Signed by             Mike Parr MD  05/15/24 6345

## 2024-05-15 NOTE — ED NOTES
RN contacted family that stated that pt normally is alert and oriented x's 4.     Marsha Morin RN  05/15/24 0921

## 2024-05-15 NOTE — ED NOTES
RN notified by co worker that CMP was again hemolyzed. Lab notified incorrect RN. Redraw completed by tech and sent     Marsha Morin RN  05/15/24 1111

## 2024-05-15 NOTE — DISCHARGE INSTRUCTIONS
Your ED tests were reassuring with no concerning findings with regard to your transient chest discomfort.  Follow up with your regular doctor, or return to the ED for recurrent symptoms.

## 2024-05-15 NOTE — ED NOTES
Pt had a BM. Pt cleansed and protective pad applied to sacrum along with ointment.      Marsha Morin RN  05/15/24 6723

## 2024-05-17 LAB
BACTERIA UR CULT: ABNORMAL
BACTERIA UR CULT: ABNORMAL

## 2024-05-19 ENCOUNTER — TELEPHONE (OUTPATIENT)
Dept: UROLOGY | Facility: AMBULATORY SURGERY CENTER | Age: 74
End: 2024-05-19

## 2024-05-19 DIAGNOSIS — N30.00 ACUTE CYSTITIS WITHOUT HEMATURIA: Primary | ICD-10-CM

## 2024-05-19 RX ORDER — LEVOFLOXACIN 500 MG/1
500 TABLET, FILM COATED ORAL EVERY 24 HOURS
Qty: 5 TABLET | Refills: 0 | Status: SHIPPED | OUTPATIENT
Start: 2024-05-19 | End: 2024-05-24

## 2024-05-20 NOTE — TELEPHONE ENCOUNTER
Please call patient and ask him to start levaquin 500 mg QD for 5 days.  Script sent.  Thank you.    FT

## 2024-05-20 NOTE — TELEPHONE ENCOUNTER
Called patient without answer. Called [facility] and  for them about a medication that was sent over to the pharmacy and asked them to call back. If they call back, please inform on the message.

## 2024-05-21 NOTE — TELEPHONE ENCOUNTER
"Called patient's mobile number that was a facility he no longer resides at. Called [facility] and left a message to call back. Called patient's \"home\" number which kept disconnecting. Tried the [facility] and call kept disconnecting.  "

## 2024-06-14 ENCOUNTER — HOSPITAL ENCOUNTER (INPATIENT)
Facility: HOSPITAL | Age: 74
LOS: 4 days | Discharge: HOME | DRG: 872 | End: 2024-06-18
Attending: EMERGENCY MEDICINE | Admitting: EMERGENCY MEDICINE
Payer: MEDICARE

## 2024-06-14 ENCOUNTER — APPOINTMENT (EMERGENCY)
Dept: CT IMAGING | Facility: HOSPITAL | Age: 74
DRG: 872 | End: 2024-06-14
Attending: EMERGENCY MEDICINE
Payer: MEDICARE

## 2024-06-14 DIAGNOSIS — E11.40 DIABETIC NEUROPATHY (HCC): ICD-10-CM

## 2024-06-14 DIAGNOSIS — K52.89 STERCORAL COLITIS: ICD-10-CM

## 2024-06-14 DIAGNOSIS — K59.00 CONSTIPATION: ICD-10-CM

## 2024-06-14 DIAGNOSIS — R09.89 SYMPTOMS OF UPPER RESPIRATORY INFECTION (URI): ICD-10-CM

## 2024-06-14 DIAGNOSIS — H53.8 BLURRY VISION: ICD-10-CM

## 2024-06-14 DIAGNOSIS — R05.9 COUGH: ICD-10-CM

## 2024-06-14 DIAGNOSIS — G35 MULTIPLE SCLEROSIS (HCC): ICD-10-CM

## 2024-06-14 DIAGNOSIS — N39.0 URINARY TRACT INFECTION: Primary | ICD-10-CM
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
## 2024-06-14 DIAGNOSIS — Z86.73 HISTORY OF CVA (CEREBROVASCULAR ACCIDENT): ICD-10-CM

## 2024-06-14 LAB
2HR DELTA HS TROPONIN: 1 NG/L
ALBUMIN SERPL BCP-MCNC: 3.7 G/DL (ref 3.5–5)
ALP SERPL-CCNC: 95 U/L (ref 34–104)
ALT SERPL W P-5'-P-CCNC: 9 U/L (ref 7–52)
ANION GAP SERPL CALCULATED.3IONS-SCNC: 7 MMOL/L (ref 4–13)
AST SERPL W P-5'-P-CCNC: 9 U/L (ref 13–39)
BACTERIA UR QL AUTO: ABNORMAL /HPF
BASOPHILS # BLD AUTO: 0.13 THOUSANDS/ÂΜL (ref 0–0.1)
BASOPHILS NFR BLD AUTO: 1 % (ref 0–1)
BILIRUB DIRECT SERPL-MCNC: 0.01 MG/DL (ref 0–0.2)
BILIRUB SERPL-MCNC: 0.24 MG/DL (ref 0.2–1)
BILIRUB UR QL STRIP: NEGATIVE
BNP SERPL-MCNC: 31 PG/ML (ref 0–100)
BUDDING YEAST: PRESENT
BUN SERPL-MCNC: 17 MG/DL (ref 5–25)
CALCIUM SERPL-MCNC: 9.9 MG/DL (ref 8.4–10.2)
CARDIAC TROPONIN I PNL SERPL HS: 5 NG/L
CARDIAC TROPONIN I PNL SERPL HS: 6 NG/L
CHLORIDE SERPL-SCNC: 103 MMOL/L (ref 96–108)
CLARITY UR: ABNORMAL
CO2 SERPL-SCNC: 25 MMOL/L (ref 21–32)
COLOR UR: COLORLESS
CREAT SERPL-MCNC: 0.91 MG/DL (ref 0.6–1.3)
D DIMER PPP FEU-MCNC: 0.53 UG/ML FEU
EOSINOPHIL # BLD AUTO: 0.8 THOUSAND/ÂΜL (ref 0–0.61)
EOSINOPHIL NFR BLD AUTO: 7 % (ref 0–6)
ERYTHROCYTE [DISTWIDTH] IN BLOOD BY AUTOMATED COUNT: 13.5 % (ref 11.6–15.1)
GFR SERPL CREATININE-BSD FRML MDRD: 82 ML/MIN/1.73SQ M
GLUCOSE SERPL-MCNC: 329 MG/DL (ref 65–140)
GLUCOSE UR STRIP-MCNC: ABNORMAL MG/DL
HCT VFR BLD AUTO: 43.7 % (ref 36.5–49.3)
HGB BLD-MCNC: 14.2 G/DL (ref 12–17)
HGB UR QL STRIP.AUTO: ABNORMAL
IMM GRANULOCYTES # BLD AUTO: 0.05 THOUSAND/UL (ref 0–0.2)
IMM GRANULOCYTES NFR BLD AUTO: 0 % (ref 0–2)
KETONES UR STRIP-MCNC: NEGATIVE MG/DL
LACTATE SERPL-SCNC: 1.2 MMOL/L (ref 0.5–2)
LEUKOCYTE ESTERASE UR QL STRIP: ABNORMAL
LYMPHOCYTES # BLD AUTO: 3.69 THOUSANDS/ÂΜL (ref 0.6–4.47)
LYMPHOCYTES NFR BLD AUTO: 30 % (ref 14–44)
MAGNESIUM SERPL-MCNC: 2.2 MG/DL (ref 1.9–2.7)
MCH RBC QN AUTO: 28.5 PG (ref 26.8–34.3)
MCHC RBC AUTO-ENTMCNC: 32.5 G/DL (ref 31.4–37.4)
MCV RBC AUTO: 88 FL (ref 82–98)
MONOCYTES # BLD AUTO: 0.85 THOUSAND/ÂΜL (ref 0.17–1.22)
MONOCYTES NFR BLD AUTO: 7 % (ref 4–12)
NEUTROPHILS # BLD AUTO: 6.78 THOUSANDS/ÂΜL (ref 1.85–7.62)
NEUTS SEG NFR BLD AUTO: 55 % (ref 43–75)
NITRITE UR QL STRIP: POSITIVE
NON-SQ EPI CELLS URNS QL MICRO: ABNORMAL /HPF
NRBC BLD AUTO-RTO: 0 /100 WBCS
PH UR STRIP.AUTO: 6.5 [PH]
PLATELET # BLD AUTO: 399 THOUSANDS/UL (ref 149–390)
PMV BLD AUTO: 8.4 FL (ref 8.9–12.7)
POTASSIUM SERPL-SCNC: 4.4 MMOL/L (ref 3.5–5.3)
PROT SERPL-MCNC: 7.7 G/DL (ref 6.4–8.4)
PROT UR STRIP-MCNC: ABNORMAL MG/DL
RBC # BLD AUTO: 4.99 MILLION/UL (ref 3.88–5.62)
RBC #/AREA URNS AUTO: ABNORMAL /HPF
SODIUM SERPL-SCNC: 135 MMOL/L (ref 135–147)
SP GR UR STRIP.AUTO: 1.04 (ref 1–1.03)
UROBILINOGEN UR STRIP-ACNC: <2 MG/DL
WBC # BLD AUTO: 12.3 THOUSAND/UL (ref 4.31–10.16)
WBC #/AREA URNS AUTO: ABNORMAL /HPF

## 2024-06-14 PROCEDURE — 36415 COLL VENOUS BLD VENIPUNCTURE: CPT | Performed by: EMERGENCY MEDICINE

## 2024-06-14 PROCEDURE — 96374 THER/PROPH/DIAG INJ IV PUSH: CPT

## 2024-06-14 PROCEDURE — 96361 HYDRATE IV INFUSION ADD-ON: CPT

## 2024-06-14 PROCEDURE — 80048 BASIC METABOLIC PNL TOTAL CA: CPT | Performed by: EMERGENCY MEDICINE

## 2024-06-14 PROCEDURE — 93005 ELECTROCARDIOGRAM TRACING: CPT

## 2024-06-14 PROCEDURE — 80076 HEPATIC FUNCTION PANEL: CPT | Performed by: EMERGENCY MEDICINE

## 2024-06-14 PROCEDURE — 99285 EMERGENCY DEPT VISIT HI MDM: CPT

## 2024-06-14 PROCEDURE — 74177 CT ABD & PELVIS W/CONTRAST: CPT

## 2024-06-14 PROCEDURE — 84484 ASSAY OF TROPONIN QUANT: CPT | Performed by: EMERGENCY MEDICINE

## 2024-06-14 PROCEDURE — 83605 ASSAY OF LACTIC ACID: CPT | Performed by: EMERGENCY MEDICINE

## 2024-06-14 PROCEDURE — 0T9B70Z DRAINAGE OF BLADDER WITH DRAINAGE DEVICE, VIA NATURAL OR ARTIFICIAL OPENING: ICD-10-PCS | Performed by: UROLOGY

## 2024-06-14 PROCEDURE — 83880 ASSAY OF NATRIURETIC PEPTIDE: CPT | Performed by: EMERGENCY MEDICINE

## 2024-06-14 PROCEDURE — 81001 URINALYSIS AUTO W/SCOPE: CPT | Performed by: EMERGENCY MEDICINE

## 2024-06-14 PROCEDURE — 71260 CT THORAX DX C+: CPT

## 2024-06-14 PROCEDURE — 87086 URINE CULTURE/COLONY COUNT: CPT | Performed by: EMERGENCY MEDICINE

## 2024-06-14 PROCEDURE — 84145 PROCALCITONIN (PCT): CPT

## 2024-06-14 PROCEDURE — 85379 FIBRIN DEGRADATION QUANT: CPT | Performed by: EMERGENCY MEDICINE

## 2024-06-14 PROCEDURE — 85025 COMPLETE CBC W/AUTO DIFF WBC: CPT | Performed by: EMERGENCY MEDICINE

## 2024-06-14 PROCEDURE — 87186 SC STD MICRODIL/AGAR DIL: CPT | Performed by: EMERGENCY MEDICINE

## 2024-06-14 PROCEDURE — 87077 CULTURE AEROBIC IDENTIFY: CPT | Performed by: EMERGENCY MEDICINE

## 2024-06-14 PROCEDURE — 87040 BLOOD CULTURE FOR BACTERIA: CPT | Performed by: EMERGENCY MEDICINE

## 2024-06-14 PROCEDURE — 83735 ASSAY OF MAGNESIUM: CPT | Performed by: EMERGENCY MEDICINE

## 2024-06-14 PROCEDURE — 99285 EMERGENCY DEPT VISIT HI MDM: CPT | Performed by: EMERGENCY MEDICINE

## 2024-06-14 RX ORDER — ONDANSETRON 2 MG/ML
4 INJECTION INTRAMUSCULAR; INTRAVENOUS ONCE
Status: COMPLETED | OUTPATIENT
Start: 2024-06-14 | End: 2024-06-14

## 2024-06-14 RX ORDER — IPRATROPIUM BROMIDE AND ALBUTEROL SULFATE 2.5; .5 MG/3ML; MG/3ML
3 SOLUTION RESPIRATORY (INHALATION) ONCE
Status: COMPLETED | OUTPATIENT
Start: 2024-06-14 | End: 2024-06-14

## 2024-06-14 RX ORDER — POLYETHYLENE GLYCOL 3350 17 G/17G
17 POWDER, FOR SOLUTION ORAL ONCE
Status: DISCONTINUED | OUTPATIENT
Start: 2024-06-14 | End: 2024-06-15

## 2024-06-14 RX ADMIN — IPRATROPIUM BROMIDE AND ALBUTEROL SULFATE 3 ML: 2.5; .5 SOLUTION RESPIRATORY (INHALATION) at 20:59

## 2024-06-14 RX ADMIN — ONDANSETRON 4 MG: 2 INJECTION INTRAMUSCULAR; INTRAVENOUS at 19:10

## 2024-06-14 RX ADMIN — IOHEXOL 100 ML: 350 INJECTION, SOLUTION INTRAVENOUS at 20:17

## 2024-06-14 RX ADMIN — CEFTRIAXONE 1000 MG: 10 INJECTION, POWDER, FOR SOLUTION INTRAVENOUS at 22:10

## 2024-06-14 RX ADMIN — SODIUM CHLORIDE 500 ML: 0.9 INJECTION, SOLUTION INTRAVENOUS at 19:08

## 2024-06-15 PROBLEM — R09.89 SYMPTOMS OF UPPER RESPIRATORY INFECTION (URI): Status: ACTIVE | Noted: 2024-06-15

## 2024-06-15 LAB
ANION GAP SERPL CALCULATED.3IONS-SCNC: 5 MMOL/L (ref 4–13)
ATRIAL RATE: 102 BPM
ATRIAL RATE: 105 BPM
BASOPHILS # BLD AUTO: 0.14 THOUSANDS/ÂΜL (ref 0–0.1)
BASOPHILS NFR BLD AUTO: 1 % (ref 0–1)
BUN SERPL-MCNC: 14 MG/DL (ref 5–25)
CALCIUM SERPL-MCNC: 9.3 MG/DL (ref 8.4–10.2)
CHLORIDE SERPL-SCNC: 107 MMOL/L (ref 96–108)
CO2 SERPL-SCNC: 24 MMOL/L (ref 21–32)
CREAT SERPL-MCNC: 0.82 MG/DL (ref 0.6–1.3)
EOSINOPHIL # BLD AUTO: 0.73 THOUSAND/ÂΜL (ref 0–0.61)
EOSINOPHIL NFR BLD AUTO: 6 % (ref 0–6)
ERYTHROCYTE [DISTWIDTH] IN BLOOD BY AUTOMATED COUNT: 13.5 % (ref 11.6–15.1)
FLUAV RNA RESP QL NAA+PROBE: NEGATIVE
FLUBV RNA RESP QL NAA+PROBE: NEGATIVE
GFR SERPL CREATININE-BSD FRML MDRD: 87 ML/MIN/1.73SQ M
GLUCOSE SERPL-MCNC: 164 MG/DL (ref 65–140)
GLUCOSE SERPL-MCNC: 167 MG/DL (ref 65–140)
GLUCOSE SERPL-MCNC: 235 MG/DL (ref 65–140)
GLUCOSE SERPL-MCNC: 250 MG/DL (ref 65–140)
GLUCOSE SERPL-MCNC: 274 MG/DL (ref 65–140)
GLUCOSE SERPL-MCNC: 282 MG/DL (ref 65–140)
HCT VFR BLD AUTO: 43.8 % (ref 36.5–49.3)
HGB BLD-MCNC: 13.8 G/DL (ref 12–17)
IMM GRANULOCYTES # BLD AUTO: 0.05 THOUSAND/UL (ref 0–0.2)
IMM GRANULOCYTES NFR BLD AUTO: 0 % (ref 0–2)
LYMPHOCYTES # BLD AUTO: 4.1 THOUSANDS/ÂΜL (ref 0.6–4.47)
LYMPHOCYTES NFR BLD AUTO: 32 % (ref 14–44)
MAGNESIUM SERPL-MCNC: 1.9 MG/DL (ref 1.9–2.7)
MCH RBC QN AUTO: 28.9 PG (ref 26.8–34.3)
MCHC RBC AUTO-ENTMCNC: 31.5 G/DL (ref 31.4–37.4)
MCV RBC AUTO: 92 FL (ref 82–98)
MONOCYTES # BLD AUTO: 0.91 THOUSAND/ÂΜL (ref 0.17–1.22)
MONOCYTES NFR BLD AUTO: 7 % (ref 4–12)
NEUTROPHILS # BLD AUTO: 7.08 THOUSANDS/ÂΜL (ref 1.85–7.62)
NEUTS SEG NFR BLD AUTO: 54 % (ref 43–75)
NRBC BLD AUTO-RTO: 0 /100 WBCS
P AXIS: 62 DEGREES
P AXIS: 66 DEGREES
PLATELET # BLD AUTO: 323 THOUSANDS/UL (ref 149–390)
PMV BLD AUTO: 8.2 FL (ref 8.9–12.7)
POTASSIUM SERPL-SCNC: 4.1 MMOL/L (ref 3.5–5.3)
PR INTERVAL: 168 MS
PR INTERVAL: 178 MS
PROCALCITONIN SERPL-MCNC: <0.05 NG/ML
QRS AXIS: 68 DEGREES
QRS AXIS: 72 DEGREES
QRSD INTERVAL: 88 MS
QRSD INTERVAL: 92 MS
QT INTERVAL: 370 MS
QT INTERVAL: 378 MS
QTC INTERVAL: 489 MS
QTC INTERVAL: 492 MS
RBC # BLD AUTO: 4.77 MILLION/UL (ref 3.88–5.62)
RSV RNA RESP QL NAA+PROBE: NEGATIVE
SARS-COV-2 RNA RESP QL NAA+PROBE: NEGATIVE
SODIUM SERPL-SCNC: 136 MMOL/L (ref 135–147)
T WAVE AXIS: 38 DEGREES
T WAVE AXIS: 43 DEGREES
VENTRICULAR RATE: 102 BPM
VENTRICULAR RATE: 105 BPM
WBC # BLD AUTO: 13.01 THOUSAND/UL (ref 4.31–10.16)

## 2024-06-15 PROCEDURE — 82948 REAGENT STRIP/BLOOD GLUCOSE: CPT

## 2024-06-15 PROCEDURE — 0202U NFCT DS 22 TRGT SARS-COV-2: CPT | Performed by: STUDENT IN AN ORGANIZED HEALTH CARE EDUCATION/TRAINING PROGRAM

## 2024-06-15 PROCEDURE — 83735 ASSAY OF MAGNESIUM: CPT

## 2024-06-15 PROCEDURE — 85025 COMPLETE CBC W/AUTO DIFF WBC: CPT

## 2024-06-15 PROCEDURE — 93010 ELECTROCARDIOGRAM REPORT: CPT | Performed by: INTERNAL MEDICINE

## 2024-06-15 PROCEDURE — 51705 CHANGE OF BLADDER TUBE: CPT

## 2024-06-15 PROCEDURE — 80048 BASIC METABOLIC PNL TOTAL CA: CPT

## 2024-06-15 PROCEDURE — 99223 1ST HOSP IP/OBS HIGH 75: CPT | Performed by: STUDENT IN AN ORGANIZED HEALTH CARE EDUCATION/TRAINING PROGRAM

## 2024-06-15 PROCEDURE — 99223 1ST HOSP IP/OBS HIGH 75: CPT | Performed by: UROLOGY

## 2024-06-15 PROCEDURE — 0241U HB NFCT DS VIR RESP RNA 4 TRGT: CPT

## 2024-06-15 RX ORDER — BISACODYL 10 MG
10 SUPPOSITORY, RECTAL RECTAL DAILY PRN
Status: DISCONTINUED | OUTPATIENT
Start: 2024-06-15 | End: 2024-06-18 | Stop reason: HOSPADM

## 2024-06-15 RX ORDER — GUAIFENESIN/DEXTROMETHORPHAN 100-10MG/5
10 SYRUP ORAL EVERY 4 HOURS PRN
Status: DISCONTINUED | OUTPATIENT
Start: 2024-06-15 | End: 2024-06-18 | Stop reason: HOSPADM

## 2024-06-15 RX ORDER — OXYBUTYNIN CHLORIDE 5 MG/1
5 TABLET ORAL 3 TIMES DAILY
Status: DISCONTINUED | OUTPATIENT
Start: 2024-06-15 | End: 2024-06-18 | Stop reason: HOSPADM

## 2024-06-15 RX ORDER — GABAPENTIN 300 MG/1
600 CAPSULE ORAL
Status: DISCONTINUED | OUTPATIENT
Start: 2024-06-15 | End: 2024-06-18 | Stop reason: HOSPADM

## 2024-06-15 RX ORDER — MIRTAZAPINE 7.5 MG/1
7.5 TABLET, FILM COATED ORAL
Status: DISCONTINUED | OUTPATIENT
Start: 2024-06-15 | End: 2024-06-18 | Stop reason: HOSPADM

## 2024-06-15 RX ORDER — AMLODIPINE BESYLATE 10 MG/1
10 TABLET ORAL DAILY
Status: DISCONTINUED | OUTPATIENT
Start: 2024-06-15 | End: 2024-06-18 | Stop reason: HOSPADM

## 2024-06-15 RX ORDER — INSULIN LISPRO 100 [IU]/ML
1-5 INJECTION, SOLUTION INTRAVENOUS; SUBCUTANEOUS
Status: DISCONTINUED | OUTPATIENT
Start: 2024-06-15 | End: 2024-06-18 | Stop reason: HOSPADM

## 2024-06-15 RX ORDER — BUDESONIDE AND FORMOTEROL FUMARATE DIHYDRATE 160; 4.5 UG/1; UG/1
2 AEROSOL RESPIRATORY (INHALATION) 2 TIMES DAILY
Status: DISCONTINUED | OUTPATIENT
Start: 2024-06-15 | End: 2024-06-18 | Stop reason: HOSPADM

## 2024-06-15 RX ORDER — MICONAZOLE NITRATE 20 MG/G
CREAM TOPICAL 2 TIMES DAILY
Status: DISCONTINUED | OUTPATIENT
Start: 2024-06-15 | End: 2024-06-18 | Stop reason: HOSPADM

## 2024-06-15 RX ORDER — PROPRANOLOL HCL 60 MG
60 CAPSULE, EXTENDED RELEASE 24HR ORAL DAILY
Status: DISCONTINUED | OUTPATIENT
Start: 2024-06-15 | End: 2024-06-18 | Stop reason: HOSPADM

## 2024-06-15 RX ORDER — ALBUTEROL SULFATE 2.5 MG/3ML
2.5 SOLUTION RESPIRATORY (INHALATION) EVERY 6 HOURS PRN
Status: DISCONTINUED | OUTPATIENT
Start: 2024-06-15 | End: 2024-06-18 | Stop reason: HOSPADM

## 2024-06-15 RX ORDER — METHENAMINE HIPPURATE 1000 MG/1
1 TABLET ORAL 2 TIMES DAILY WITH MEALS
Status: DISCONTINUED | OUTPATIENT
Start: 2024-06-15 | End: 2024-06-18 | Stop reason: HOSPADM

## 2024-06-15 RX ORDER — POLYETHYLENE GLYCOL 3350 17 G/17G
17 POWDER, FOR SOLUTION ORAL DAILY
Status: DISCONTINUED | OUTPATIENT
Start: 2024-06-15 | End: 2024-06-17

## 2024-06-15 RX ORDER — BENZONATATE 100 MG/1
100 CAPSULE ORAL 3 TIMES DAILY PRN
Status: DISCONTINUED | OUTPATIENT
Start: 2024-06-15 | End: 2024-06-18 | Stop reason: HOSPADM

## 2024-06-15 RX ORDER — GUAIFENESIN 600 MG/1
600 TABLET, EXTENDED RELEASE ORAL EVERY 12 HOURS SCHEDULED
Status: DISCONTINUED | OUTPATIENT
Start: 2024-06-15 | End: 2024-06-17

## 2024-06-15 RX ORDER — LANOLIN ALCOHOL/MO/W.PET/CERES
3 CREAM (GRAM) TOPICAL
Status: DISCONTINUED | OUTPATIENT
Start: 2024-06-15 | End: 2024-06-18 | Stop reason: HOSPADM

## 2024-06-15 RX ORDER — AMOXICILLIN 250 MG
2 CAPSULE ORAL
Status: DISCONTINUED | OUTPATIENT
Start: 2024-06-15 | End: 2024-06-18 | Stop reason: HOSPADM

## 2024-06-15 RX ORDER — ATORVASTATIN CALCIUM 80 MG/1
80 TABLET, FILM COATED ORAL
Status: DISCONTINUED | OUTPATIENT
Start: 2024-06-15 | End: 2024-06-18 | Stop reason: HOSPADM

## 2024-06-15 RX ORDER — BACLOFEN 10 MG/1
5 TABLET ORAL 3 TIMES DAILY
Status: DISCONTINUED | OUTPATIENT
Start: 2024-06-15 | End: 2024-06-18 | Stop reason: HOSPADM

## 2024-06-15 RX ORDER — ENOXAPARIN SODIUM 100 MG/ML
40 INJECTION SUBCUTANEOUS DAILY
Status: DISCONTINUED | OUTPATIENT
Start: 2024-06-15 | End: 2024-06-18 | Stop reason: HOSPADM

## 2024-06-15 RX ORDER — CLOPIDOGREL BISULFATE 75 MG/1
75 TABLET ORAL DAILY
Status: DISCONTINUED | OUTPATIENT
Start: 2024-06-15 | End: 2024-06-18 | Stop reason: HOSPADM

## 2024-06-15 RX ORDER — GABAPENTIN 300 MG/1
300 CAPSULE ORAL
Status: DISCONTINUED | OUTPATIENT
Start: 2024-06-15 | End: 2024-06-18 | Stop reason: HOSPADM

## 2024-06-15 RX ORDER — PANTOPRAZOLE SODIUM 40 MG/1
40 TABLET, DELAYED RELEASE ORAL
Status: DISCONTINUED | OUTPATIENT
Start: 2024-06-15 | End: 2024-06-18 | Stop reason: HOSPADM

## 2024-06-15 RX ORDER — SODIUM CHLORIDE 9 MG/ML
50 INJECTION, SOLUTION INTRAVENOUS CONTINUOUS
Status: DISPENSED | OUTPATIENT
Start: 2024-06-15 | End: 2024-06-15

## 2024-06-15 RX ORDER — ACETAMINOPHEN 325 MG/1
975 TABLET ORAL EVERY 8 HOURS SCHEDULED
Status: DISCONTINUED | OUTPATIENT
Start: 2024-06-15 | End: 2024-06-18 | Stop reason: HOSPADM

## 2024-06-15 RX ADMIN — SENNOSIDES AND DOCUSATE SODIUM 2 TABLET: 50; 8.6 TABLET ORAL at 21:53

## 2024-06-15 RX ADMIN — ENOXAPARIN SODIUM 40 MG: 40 INJECTION SUBCUTANEOUS at 09:22

## 2024-06-15 RX ADMIN — ACETAMINOPHEN 975 MG: 325 TABLET, FILM COATED ORAL at 01:07

## 2024-06-15 RX ADMIN — METHENAMINE HIPPURATE 1 G: 1000 TABLET ORAL at 16:24

## 2024-06-15 RX ADMIN — PROPRANOLOL HYDROCHLORIDE 60 MG: 60 CAPSULE, EXTENDED RELEASE ORAL at 09:33

## 2024-06-15 RX ADMIN — ATORVASTATIN CALCIUM 80 MG: 80 TABLET, FILM COATED ORAL at 16:25

## 2024-06-15 RX ADMIN — BACLOFEN 5 MG: 10 TABLET ORAL at 16:25

## 2024-06-15 RX ADMIN — GUAIFENESIN 600 MG: 600 TABLET, EXTENDED RELEASE ORAL at 09:32

## 2024-06-15 RX ADMIN — GUAIFENESIN 600 MG: 600 TABLET, EXTENDED RELEASE ORAL at 21:49

## 2024-06-15 RX ADMIN — MIRTAZAPINE 7.5 MG: 7.5 TABLET, FILM COATED ORAL at 21:53

## 2024-06-15 RX ADMIN — BUDESONIDE AND FORMOTEROL FUMARATE DIHYDRATE 2 PUFF: 160; 4.5 AEROSOL RESPIRATORY (INHALATION) at 17:54

## 2024-06-15 RX ADMIN — MICONAZOLE NITRATE: 20 CREAM TOPICAL at 17:53

## 2024-06-15 RX ADMIN — POLYETHYLENE GLYCOL 3350 17 G: 17 POWDER, FOR SOLUTION ORAL at 09:33

## 2024-06-15 RX ADMIN — MICONAZOLE NITRATE: 20 CREAM TOPICAL at 09:24

## 2024-06-15 RX ADMIN — ACETAMINOPHEN 975 MG: 325 TABLET, FILM COATED ORAL at 08:00

## 2024-06-15 RX ADMIN — PANTOPRAZOLE SODIUM 40 MG: 40 TABLET, DELAYED RELEASE ORAL at 06:09

## 2024-06-15 RX ADMIN — INSULIN LISPRO 2 UNITS: 100 INJECTION, SOLUTION INTRAVENOUS; SUBCUTANEOUS at 01:13

## 2024-06-15 RX ADMIN — BUDESONIDE AND FORMOTEROL FUMARATE DIHYDRATE 2 PUFF: 160; 4.5 AEROSOL RESPIRATORY (INHALATION) at 09:23

## 2024-06-15 RX ADMIN — GABAPENTIN 600 MG: 300 CAPSULE ORAL at 01:07

## 2024-06-15 RX ADMIN — MIRTAZAPINE 7.5 MG: 7.5 TABLET, FILM COATED ORAL at 01:08

## 2024-06-15 RX ADMIN — INSULIN LISPRO 3 UNITS: 100 INJECTION, SOLUTION INTRAVENOUS; SUBCUTANEOUS at 21:55

## 2024-06-15 RX ADMIN — GABAPENTIN 600 MG: 300 CAPSULE ORAL at 21:53

## 2024-06-15 RX ADMIN — ACETAMINOPHEN 975 MG: 325 TABLET, FILM COATED ORAL at 21:48

## 2024-06-15 RX ADMIN — BACLOFEN 5 MG: 10 TABLET ORAL at 21:49

## 2024-06-15 RX ADMIN — CEFEPIME 2000 MG: 2 INJECTION, POWDER, FOR SOLUTION INTRAVENOUS at 09:33

## 2024-06-15 RX ADMIN — INSULIN LISPRO 1 UNITS: 100 INJECTION, SOLUTION INTRAVENOUS; SUBCUTANEOUS at 07:56

## 2024-06-15 RX ADMIN — SODIUM CHLORIDE 50 ML/HR: 0.9 INJECTION, SOLUTION INTRAVENOUS at 01:09

## 2024-06-15 RX ADMIN — GUAIFENESIN 600 MG: 600 TABLET, EXTENDED RELEASE ORAL at 01:08

## 2024-06-15 RX ADMIN — ACETAMINOPHEN 975 MG: 325 TABLET, FILM COATED ORAL at 13:41

## 2024-06-15 RX ADMIN — BACLOFEN 5 MG: 10 TABLET ORAL at 09:23

## 2024-06-15 RX ADMIN — INSULIN LISPRO 3 UNITS: 100 INJECTION, SOLUTION INTRAVENOUS; SUBCUTANEOUS at 12:01

## 2024-06-15 RX ADMIN — INSULIN LISPRO 2 UNITS: 100 INJECTION, SOLUTION INTRAVENOUS; SUBCUTANEOUS at 16:24

## 2024-06-15 RX ADMIN — OXYBUTYNIN CHLORIDE 5 MG: 5 TABLET ORAL at 16:26

## 2024-06-15 RX ADMIN — CEFEPIME 2000 MG: 2 INJECTION, POWDER, FOR SOLUTION INTRAVENOUS at 22:05

## 2024-06-15 RX ADMIN — METHENAMINE HIPPURATE 1 G: 1000 TABLET ORAL at 08:00

## 2024-06-15 RX ADMIN — OXYBUTYNIN CHLORIDE 5 MG: 5 TABLET ORAL at 21:49

## 2024-06-15 RX ADMIN — GABAPENTIN 300 MG: 300 CAPSULE ORAL at 09:22

## 2024-06-15 RX ADMIN — OXYBUTYNIN CHLORIDE 5 MG: 5 TABLET ORAL at 12:01

## 2024-06-15 RX ADMIN — AMLODIPINE BESYLATE 10 MG: 10 TABLET ORAL at 09:22

## 2024-06-15 RX ADMIN — CLOPIDOGREL BISULFATE 75 MG: 75 TABLET ORAL at 09:22

## 2024-06-15 NOTE — ASSESSMENT & PLAN NOTE
Lab Results   Component Value Date    HGBA1C 6.4 (H) 03/26/2024       Recent Labs     06/15/24  0100   POCGLU 250*       Blood Sugar Average: Last 72 hrs:  (P) 250  Hold metformin  SSI with accucheks  Continue gabapentin for neuropathy

## 2024-06-15 NOTE — ASSESSMENT & PLAN NOTE
Wt Readings from Last 3 Encounters:   06/15/24 67.5 kg (148 lb 13 oz)   03/31/24 69.3 kg (152 lb 12.5 oz)   03/29/24 63 kg (138 lb 14.2 oz)     Pt appears euvolemic on exam  Last ECHO 10/18 EF 75% with grade 1 DD  Monitor daily weights, I/Os

## 2024-06-15 NOTE — ASSESSMENT & PLAN NOTE
"Presented to [facility] ED 6/15 with generalized weakness, shortness of breath, and nausea  Met SIRS criteria with WBC 12.30 and tachycardia  UA + nitrites, UC pending  Creatinine stable   VSS, afebrile   CT chest/a/p demonstrating \"Decompressed urinary bladder with a suprapubic catheter in place. There is severe bladder wall thickening with perivesical fatty haziness suspicious for cystitis.\"  SPT exchanged today by urology as patient cannot recall last exchange date  Ensure proper drainage   Begin oxybutynin for bladder spasms   Continue antibiotic regiment and medical optimization per internal medicine team  Recommend SPT is exchanged every 4 weeks to decrease UTIs   No further urologic intervention needed at this time, urology will sign off  "

## 2024-06-15 NOTE — UTILIZATION REVIEW
Notification of Unplanned, Urgent, or   Emergency Inpatient Admission   AUTHORIZATION REQUEST   Admitting Facility [place] [address]  Tax ID: [id number]  NPI: [id number]  [facility] of Service: [facility]  Admission Level of Care: Inpatient  Place of Service Code: 21     Attending Physician Information  Attending Name and NPI#: Sanjeev Concepcion Md [[id number]]  Phone: [phone]     Admission Information  Inpatient Admission Date/Time: 6/14/24 10:03 PM  Discharge Date/Time: No discharge date for patient encounter.  Admitting Diagnosis Code/Description:  Weakness generalized [R53.1]  Urinary tract infection [N39.0]  Constipation [K59.00]     Utilization Review Contact  Marsha Garcia, Utilization   Phone: [phone]  Fax: [phone]  Email: [email or web address]  Contact for approvals/pending authorizations, clinical reviews, and discharge.     Physician Advisory Services Contact  Medical Necessity Denial & Diwo-wm-Bwus Discussion  Phone: [phone]  Fax: [phone]  Email: [email or web address]     DISCHARGE SUPPORT TEAM:  For Patients Discharge Needs & Updates  Phone: [phone] opt. 2 Fax: [phone]  Email: [email or web address]

## 2024-06-15 NOTE — ED PROVIDER NOTES
History  Chief Complaint   Patient presents with    Weakness - Generalized     Patient arrives via ems coming from home c/o weakness and sob that started today, patient denies sick contacts at home      74 YO male with history of MS presents with shortness of breath and nausea. States this began a few hours prior to arrival. He notes this did seem to occur rather rapidly. He has some generalized weakness associated with this. Patient denies any chest pain. He states he has had similar in the past, he has been evaluated for similar but does not know what the cause has been in the past and is unsure which interventions have been used to treat this. Patient states symptoms do continue. Looking through records, it appears patient has had similar presentations when associated with a urinary tract infection and pyelonephritis. Pt denies CP/F/C/D/C, no dysuria, burning on urination or blood in urine.       History provided by:  Patient   used: No        Prior to Admission Medications   Prescriptions Last Dose Informant Patient Reported? Taking?   Empagliflozin (JARDIANCE) 10 MG TABS tablet  Outside Facility (Specify) Yes No   Sig: Take 10 mg by mouth every morning   Ergocalciferol (VITAMIN D2 PO)  Outside Facility (Specify) Yes No   Sig: Take 50,000 Units by mouth once a week   acetaminophen (TYLENOL) 325 mg tablet  Outside Facility (Specify) No No   Sig: Take 2 tablets (650 mg total) by mouth every 6 (six) hours as needed for mild pain   acetaminophen (TYLENOL) 325 mg tablet  Outside Facility (Specify) No No   Sig: Take 3 tablets (975 mg total) by mouth every 8 (eight) hours   albuterol (2.5 mg/3 mL) 0.083 % nebulizer solution  Outside Facility (Specify) No No   Sig: Take 3 mL (2.5 mg total) by nebulization every 6 (six) hours as needed for wheezing or shortness of breath   amLODIPine-atorvastatin (CADUET) 10-80 MG per tablet  Outside Facility (Specify) Yes No   Sig: Take 1 tablet by mouth daily    baclofen 10 mg tablet  Outside Facility (Specify) Yes No   Sig: Take 5 mg by mouth 3 (three) times a day   bisacodyl (DULCOLAX) 10 mg suppository  Outside Facility (Specify) Yes No   Sig: Insert 10 mg into the rectum daily as needed for constipation   budesonide-formoterol (SYMBICORT) 160-4.5 mcg/act inhaler  Outside Facility (Specify) Yes No   Sig: Inhale 2 puffs 2 (two) times a day Rinse mouth after use.   clopidogrel (PLAVIX) 75 mg tablet  Outside Facility (Specify) No No   Sig: Take 1 tablet (75 mg total) by mouth daily   cyanocobalamin (VITAMIN B-12) 500 MCG tablet  Outside Facility (Specify) No No   Sig: Take 1 tablet (500 mcg total) by mouth daily   gabapentin (NEURONTIN) 300 mg capsule  Outside Facility (Specify) No No   Sig: Take 1 capsule (300 mg total) by mouth 2 (two) times a day   gabapentin (NEURONTIN) 300 mg capsule  Outside Facility (Specify) No No   Sig: Take 2 capsules (600 mg total) by mouth daily at bedtime   latanoprost (XALATAN) 0.005 % ophthalmic solution  Outside Facility (Specify) Yes No   Sig: Administer 1 drop to both eyes daily at bedtime   lidocaine (LIDODERM) 5 %  Outside Facility (Specify) No No   Sig: Apply 1 patch topically over 12 hours daily Remove & Discard patch within 12 hours or as directed by MD   melatonin 3 mg  Outside Facility (Specify) Yes No   Sig: Take 3 mg by mouth daily at bedtime as needed   metFORMIN (GLUCOPHAGE) 1000 MG tablet  Outside Facility (Specify) Yes No   Sig: Take 1,000 mg by mouth 2 (two) times a day with meals   methenamine hippurate (HIPREX) 1 g tablet  Outside Facility (Specify) No No   Sig: Take 1 tablet (1 g total) by mouth 2 (two) times a day with meals   mirtazapine (REMERON) 7.5 MG tablet  Outside Facility (Specify) Yes No   Sig: Take 7.5 mg by mouth daily at bedtime   pantoprazole (PROTONIX) 40 mg tablet  Outside Facility (Specify) Yes No   Sig: Take 40 mg by mouth daily   polyethylene glycol (MIRALAX) 17 g packet  Outside Facility (Specify) No  No   Sig: Take 17 g by mouth daily   propranolol (INDERAL LA) 60 mg 24 hr capsule  Outside Facility (Specify) Yes No   Sig: Take 60 mg by mouth daily   senna-docusate sodium (SENOKOT S) 8.6-50 mg per tablet  Outside Facility (Specify) Yes No   Sig: Take 2 tablets by mouth daily at bedtime      Facility-Administered Medications: None       Past Medical History:   Diagnosis Date    Acute laryngitis     Acute nonsuppurative otitis media, unspecified laterality     Arm weakness     Arthritis     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HCC)     Bladder infection     Bronchitis     Constipation     Cough     Diabetes (HCC)     Diabetes mellitus (HCC)     Dizziness     Dysfunction of eustachian tube     Erectile dysfunction of non-organic origin     Fatigue     Glaucoma     Hiatal hernia     Hypertension     Imbalance     Leg muscle spasm     MS (multiple sclerosis) (HCC)     Multiple sclerosis (HCC)     Nephrolithiasis     Neurogenic bladder     No natural teeth     Sinus pain     Spinal stenosis     Strain of thoracic region     Stroke ([facility])     Suprapubic catheter ([facility])        Past Surgical History:   Procedure Laterality Date    APPENDECTOMY      BRAIN SURGERY      Coil placed in aneurysm    CEREBRAL ANEURYSM REPAIR      CYSTOSCOPY      CYSTOSCOPY      CYSTOSCOPY  06/11/2018    CYSTOSCOPY  01/15/2021    EYE SURGERY      transscleral cyclophotocoagulation noncontact YAG laser    IR SUPRAPUBIC CATHETER CHECK/CHANGE/REINSERTION/UPSIZE  3/28/2024    MYRINGOTOMY      with ventilation tube insertion    ME LITHOLAPAXY SMPL/SM <2.5 CM N/A 5/7/2019    Procedure: CYSTOSCOPY, holmium laser litholapaxy of bladder stones, EXCHANGE OF SP TUBE;  Surgeon: Javy Jeffries MD;  Location: [facility];  Service: Urology    SUPRAPUBIC CATHETER INSERTION         Family History   Problem Relation Age of Onset    Heart attack Mother     Stroke Mother     Heart attack Father     Anuerysm Father         In Stomach     Aneurysm Father      I have reviewed and  agree with the history as documented.    E-Cigarette/Vaping    E-Cigarette Use Never User      E-Cigarette/Vaping Substances    Nicotine No     THC No     CBD No     Flavoring No     Other No     Unknown No      Social History     Tobacco Use    Smoking status: Former     Current packs/day: 0.00     Average packs/day: 0.5 packs/day for 31.0 years (15.5 ttl pk-yrs)     Types: Cigarettes     Start date:      Quit date:      Years since quittin.4    Smokeless tobacco: Never   Vaping Use    Vaping status: Never Used   Substance Use Topics    Alcohol use: Not Currently    Drug use: No       Review of Systems   Constitutional:  Negative for fever.   HENT:  Negative for dental problem.    Eyes:  Negative for visual disturbance.   Respiratory:  Positive for shortness of breath.    Cardiovascular:  Negative for chest pain.   Gastrointestinal:  Positive for nausea. Negative for abdominal pain and vomiting.   Genitourinary:  Negative for dysuria and frequency.   Musculoskeletal:  Negative for neck pain and neck stiffness.   Skin:  Negative for rash.   Neurological:  Positive for weakness. Negative for dizziness and light-headedness.   Psychiatric/Behavioral:  Negative for agitation, behavioral problems and confusion.    All other systems reviewed and are negative.      Physical Exam  Physical Exam  Vitals and nursing note reviewed.   Constitutional:       Appearance: He is well-developed.   HENT:      Head: Normocephalic and atraumatic.   Eyes:      Extraocular Movements: Extraocular movements intact.   Cardiovascular:      Rate and Rhythm: Regular rhythm. Tachycardia present.      Pulses: Normal pulses.      Heart sounds: Normal heart sounds.   Pulmonary:      Effort: Pulmonary effort is normal.      Breath sounds: Normal breath sounds.   Abdominal:      General: There is no distension.   Musculoskeletal:         General: Normal range of motion.      Cervical back: Normal range of motion.   Skin:     Findings: No  rash.   Neurological:      Mental Status: He is alert and oriented to person, place, and time.   Psychiatric:         Behavior: Behavior normal.         Vital Signs  ED Triage Vitals   Temperature Pulse Respirations Blood Pressure SpO2   06/14/24 1830 06/14/24 1830 06/14/24 1830 06/14/24 1830 06/14/24 1830   98.7 °F (37.1 °C) 101 19 (!) 195/81 97 %      Temp Source Heart Rate Source Patient Position - Orthostatic VS BP Location FiO2 (%)   06/14/24 1830 06/14/24 1830 06/14/24 1830 06/14/24 1830 --   Oral Monitor Sitting Right arm       Pain Score       06/15/24 0015       No Pain           Vitals:    06/14/24 1830 06/14/24 1900 06/14/24 2100 06/14/24 2338   BP: (!) 195/81 152/67 155/68 161/74   Pulse: 101 102 101 100   Patient Position - Orthostatic VS: Sitting Lying Lying Lying         Visual Acuity      ED Medications  Medications   amLODIPine (NORVASC) tablet 10 mg (has no administration in time range)     And   atorvastatin (LIPITOR) tablet 80 mg (has no administration in time range)   acetaminophen (TYLENOL) tablet 975 mg (975 mg Oral Given 6/15/24 0107)   baclofen tablet 5 mg (has no administration in time range)   budesonide-formoterol (SYMBICORT) 160-4.5 mcg/act inhaler 2 puff (has no administration in time range)   clopidogrel (PLAVIX) tablet 75 mg (has no administration in time range)   gabapentin (NEURONTIN) capsule 300 mg (has no administration in time range)   gabapentin (NEURONTIN) capsule 600 mg (600 mg Oral Given 6/15/24 0107)   methenamine hippurate (HIPREX) tablet 1 g (has no administration in time range)   mirtazapine (REMERON) tablet 7.5 mg (7.5 mg Oral Given 6/15/24 0108)   pantoprazole (PROTONIX) EC tablet 40 mg (has no administration in time range)   polyethylene glycol (MIRALAX) packet 17 g (17 g Oral Not Given 6/15/24 0105)   senna-docusate sodium (SENOKOT S) 8.6-50 mg per tablet 2 tablet (2 tablets Oral Not Given 6/15/24 0106)   propranolol (INDERAL LA) 24 hr capsule 60 mg (has no  administration in time range)   albuterol inhalation solution 2.5 mg (has no administration in time range)   bisacodyl (DULCOLAX) rectal suppository 10 mg (has no administration in time range)   melatonin tablet 3 mg (has no administration in time range)   guaiFENesin (MUCINEX) 12 hr tablet 600 mg (600 mg Oral Given 6/15/24 0108)   benzonatate (TESSALON PERLES) capsule 100 mg (has no administration in time range)   dextromethorphan-guaiFENesin (ROBITUSSIN DM) oral syrup 10 mL (has no administration in time range)   enoxaparin (LOVENOX) subcutaneous injection 40 mg (has no administration in time range)   insulin lispro (HumALOG/ADMELOG) 100 units/mL subcutaneous injection 1-5 Units (has no administration in time range)   insulin lispro (HumALOG/ADMELOG) 100 units/mL subcutaneous injection 1-5 Units (2 Units Subcutaneous Given 6/15/24 0113)   sodium chloride 0.9 % infusion (50 mL/hr Intravenous New Bag 6/15/24 0109)   ceFEPime (MAXIPIME) 2,000 mg in dextrose 5 % 50 mL IVPB (has no administration in time range)   moisture barrier miconazole 2% cream (aka AMPARO MOISTURE BARRIER ANTIFUNGAL CREAM) (has no administration in time range)   ondansetron (ZOFRAN) injection 4 mg (4 mg Intravenous Given 6/14/24 1910)   sodium chloride 0.9 % bolus 500 mL (0 mL Intravenous Stopped 6/14/24 2008)   ipratropium-albuterol (DUO-NEB) 0.5-2.5 mg/3 mL inhalation solution 3 mL (3 mL Nebulization Given 6/14/24 2059)   iohexol (OMNIPAQUE) 350 MG/ML injection (MULTI-DOSE) 100 mL (100 mL Intravenous Given 6/14/24 2017)   ceftriaxone (ROCEPHIN) 1 g/50 mL in dextrose IVPB (1,000 mg Intravenous New Bag 6/14/24 2210)       Diagnostic Studies  Results Reviewed       Procedure Component Value Units Date/Time    Blood culture #1 [[id number]] Collected: 06/14/24 1858    Lab Status: Preliminary result Specimen: Blood from Arm, Right Updated: 06/15/24 0101     Blood Culture Received in Microbiology Lab. Culture in Progress.    Blood culture #2 [[id number]]  Collected: 06/14/24 1357    Lab Status: Preliminary result Specimen: Blood from Arm, Left Updated: 06/15/24 0101     Blood Culture Received in Microbiology Lab. Culture in Progress.    HS Troponin I 2hr [[id number]]  (Normal) Collected: 06/14/24 2057    Lab Status: Final result Specimen: Blood from Arm, Right Updated: 06/14/24 2128     hs TnI 2hr 6 ng/L      Delta 2hr hsTnI 1 ng/L     Urine Microscopic [[id number]]  (Abnormal) Collected: 06/14/24 2057    Lab Status: Final result Specimen: Urine, Indwelling Cage Catheter Updated: 06/14/24 2124     RBC, UA Innumerable /hpf      WBC, UA Innumerable /hpf      Epithelial Cells None Seen /hpf      Bacteria, UA Occasional /hpf      Budding Yeast Present    UA (URINE) with reflex to Scope [[id number]]  (Abnormal) Collected: 06/14/24 2057    Lab Status: Final result Specimen: Urine, Indwelling Cage Catheter Updated: 06/14/24 2122     Color, UA Colorless     Clarity, UA Turbid     Specific Gravity, UA 1.039     pH, UA 6.5     Leukocytes, UA Large     Nitrite, UA Positive     Protein, UA Trace mg/dl      Glucose, UA >=1000 (1%) mg/dl      Ketones, UA Negative mg/dl      Urobilinogen, UA <2.0 mg/dl      Bilirubin, UA Negative     Occult Blood, UA Small    Urine culture [[id number]] Collected: 06/14/24 2057    Lab Status: In process Specimen: Urine, Suprapubic catheter Updated: 06/14/24 2101    HS Troponin 0hr (reflex protocol) [[id number]]  (Normal) Collected: 06/14/24 1357    Lab Status: Final result Specimen: Blood from Arm, Left Updated: 06/14/24 1933     hs TnI 0hr 5 ng/L     B-Type Natriuretic Peptide(BNP) [[id number]]  (Normal) Collected: 06/14/24 1357    Lab Status: Final result Specimen: Blood from Arm, Left Updated: 06/14/24 1932     BNP 31 pg/mL     Lactic acid, plasma (w/reflex if result > 2.0) [[id number]]  (Normal) Collected: 06/14/24 1357    Lab Status: Final result Specimen: Blood from Arm, Left Updated: 06/14/24 1929     LACTIC ACID 1.2 mmol/L     Narrative:       Result may be elevated if tourniquet was used during collection.    Basic metabolic panel [[id number]]  (Abnormal) Collected: 06/14/24 1357    Lab Status: Final result Specimen: Blood from Arm, Left Updated: 06/14/24 1928     Sodium 135 mmol/L      Potassium 4.4 mmol/L      Chloride 103 mmol/L      CO2 25 mmol/L      ANION GAP 7 mmol/L      BUN 17 mg/dL      Creatinine 0.91 mg/dL      Glucose 329 mg/dL      Calcium 9.9 mg/dL      eGFR 82 ml/min/1.73sq m     Narrative:      [employer] guidelines for Chronic Kidney Disease (CKD):     Stage 1 with normal or high GFR (GFR > 90 mL/min/1.73 square meters)    Stage 2 Mild CKD (GFR = 60-89 mL/min/1.73 square meters)    Stage 3A Moderate CKD (GFR = 45-59 mL/min/1.73 square meters)    Stage 3B Moderate CKD (GFR = 30-44 mL/min/1.73 square meters)    Stage 4 Severe CKD (GFR = 15-29 mL/min/1.73 square meters)    Stage 5 End Stage CKD (GFR <15 mL/min/1.73 square meters)  Note: GFR calculation is accurate only with a steady state creatinine    Hepatic function panel [[id number]]  (Abnormal) Collected: 06/14/24 1357    Lab Status: Final result Specimen: Blood from Arm, Left Updated: 06/14/24 1928     Total Bilirubin 0.24 mg/dL      Bilirubin, Direct 0.01 mg/dL      Alkaline Phosphatase 95 U/L      AST 9 U/L      ALT 9 U/L      Total Protein 7.7 g/dL      Albumin 3.7 g/dL     Magnesium [[id number]]  (Normal) Collected: 06/14/24 1357    Lab Status: Final result Specimen: Blood from Arm, Left Updated: 06/14/24 1928     Magnesium 2.2 mg/dL     D-Dimer [[id number]]  (Abnormal) Collected: 06/14/24 1357    Lab Status: Final result Specimen: Blood from Arm, Left Updated: 06/14/24 1925     D-Dimer, Quant 0.53 ug/ml FEU     Narrative:      In the evaluation for possible pulmonary embolism, in the appropriate (Well's Score of 4 or less) patient, the age adjusted d-dimer cutoff for this patient can be calculated as:    Age x 0.01 (in ug/mL) for Age-adjusted D-dimer  exclusion threshold for a patient over 50 years.    CBC and differential [[id number]]  (Abnormal) Collected: 06/14/24 1357    Lab Status: Final result Specimen: Blood from Arm, Left Updated: 06/14/24 1908     WBC 12.30 Thousand/uL      RBC 4.99 Million/uL      Hemoglobin 14.2 g/dL      Hematocrit 43.7 %      MCV 88 fL      MCH 28.5 pg      MCHC 32.5 g/dL      RDW 13.5 %      MPV 8.4 fL      Platelets 399 Thousands/uL      nRBC 0 /100 WBCs      Segmented % 55 %      Immature Grans % 0 %      Lymphocytes % 30 %      Monocytes % 7 %      Eosinophils Relative 7 %      Basophils Relative 1 %      Absolute Neutrophils 6.78 Thousands/µL      Absolute Immature Grans 0.05 Thousand/uL      Absolute Lymphocytes 3.69 Thousands/µL      Absolute Monocytes 0.85 Thousand/µL      Eosinophils Absolute 0.80 Thousand/µL      Basophils Absolute 0.13 Thousands/µL                    CT chest abdomen pelvis w contrast   Final Result by Tosin King MD (06/14 2135)      Marked rectal fecal impaction with wall thickening and perirectal inflammation consistent with stercoral proctitis.      Large stool burden throughout the colon consistent with constipation.      Decompressed urinary bladder with a suprapubic catheter in place. There is severe bladder wall thickening with perivesical fatty haziness suspicious for cystitis.      No acute intrathoracic process.      The study was marked in EPIC for immediate notification.               Workstation performed: [id number]                    Procedures  Procedures         ED Course  ED Course as of 06/15/24 0113   Fri Jun 14, 2024   1941 D-Dimer, Quant(!): 0.53  Age adjusted, patient ruled out for PE.                               SBIRT 22yo+      Flowsheet Row Most Recent Value   Initial Alcohol Screen: US AUDIT-C     1. How often do you have a drink containing alcohol? 0 Filed at: 06/14/2024 1833   2. How many drinks containing alcohol do you have on a typical day you are drinking?  0 Filed  at: 06/14/2024 1833   3a. Male UNDER 65: How often do you have five or more drinks on one occasion? 0 Filed at: 06/14/2024 1833   3b. FEMALE Any Age, or MALE 65+: How often do you have 4 or more drinks on one occassion? 0 Filed at: 06/14/2024 1833   Audit-C Score 0 Filed at: 06/14/2024 1833   ALPESH: How many times in the past year have you...    Used an illegal drug or used a prescription medication for non-medical reasons? Never Filed at: 06/14/2024 1833                      Medical Decision Making  1. Shortness of breath - Patient does not appear to have significantly elevated work of breathing, he has clear breath sounds. Records indicate he has had similar when septic from a UTI, patient does have an indwelling suprapubic catheter. Will check CBC for leukocytosis, metabolic panel for electrolyte abnormalities and dehydration,  LFT's to assess GB dysfunction, lipase for pancreatitis, blood cultures and lactic acid. Will order ECG and troponin to rule out acute MI, BNP for CHF, d-dimer to rule out PE. Will give IV fluids, try breathing Tx.     Problems Addressed:  Constipation: chronic illness or injury  Urinary tract infection: undiagnosed new problem with uncertain prognosis    Amount and/or Complexity of Data Reviewed  Labs: ordered. Decision-making details documented in ED Course.  Radiology: ordered.    Risk  Prescription drug management.  Decision regarding hospitalization.             Disposition  Final diagnoses:   Urinary tract infection   Constipation     Time reflects when diagnosis was documented in both MDM as applicable and the Disposition within this note       Time User Action Codes Description Comment    6/14/2024 10:02 PM Mario Sosa Add [N30.90] Cystitis     6/14/2024 10:02 PM Mario Sosa Remove [N30.90] Cystitis     6/14/2024 10:02 PM Mario Sosa Add [N39.0] Urinary tract infection     6/14/2024 10:02 PM Mario Sosa Add [K59.00] Constipation           ED Disposition       ED  Disposition   Admit    Condition   Stable    Date/Time   Fri Jun 14, 2024 2201    Comment   Case was discussed with LISSETTE and the patient's admission status was agreed to be Admission Status: inpatient status to the service of Dr. Perea .               Follow-up Information    None         Current Discharge Medication List        CONTINUE these medications which have NOT CHANGED    Details   !! acetaminophen (TYLENOL) 325 mg tablet Take 2 tablets (650 mg total) by mouth every 6 (six) hours as needed for mild pain    Associated Diagnoses: Chronic suprapubic catheter (HCC)      !! acetaminophen (TYLENOL) 325 mg tablet Take 3 tablets (975 mg total) by mouth every 8 (eight) hours    Associated Diagnoses: Fall, initial encounter      albuterol (2.5 mg/3 mL) 0.083 % nebulizer solution Take 3 mL (2.5 mg total) by nebulization every 6 (six) hours as needed for wheezing or shortness of breath  Qty: 60 mL, Refills: 3    Associated Diagnoses: Shortness of breath      amLODIPine-atorvastatin (CADUET) 10-80 MG per tablet Take 1 tablet by mouth daily      baclofen 10 mg tablet Take 5 mg by mouth 3 (three) times a day      bisacodyl (DULCOLAX) 10 mg suppository Insert 10 mg into the rectum daily as needed for constipation      budesonide-formoterol (SYMBICORT) 160-4.5 mcg/act inhaler Inhale 2 puffs 2 (two) times a day Rinse mouth after use.      clopidogrel (PLAVIX) 75 mg tablet Take 1 tablet (75 mg total) by mouth daily  Refills: 0    Associated Diagnoses: Chronic suprapubic catheter (HCC); Neurogenic bladder; Cellulitis      cyanocobalamin (VITAMIN B-12) 500 MCG tablet Take 1 tablet (500 mcg total) by mouth daily    Associated Diagnoses: Vitamin B deficiency      Empagliflozin (JARDIANCE) 10 MG TABS tablet Take 10 mg by mouth every morning      Ergocalciferol (VITAMIN D2 PO) Take 50,000 Units by mouth once a week      !! gabapentin (NEURONTIN) 300 mg capsule Take 1 capsule (300 mg total) by mouth 2 (two) times a day  Qty: 60  capsule, Refills: 0    Associated Diagnoses: Multiple sclerosis (HCC)      !! gabapentin (NEURONTIN) 300 mg capsule Take 2 capsules (600 mg total) by mouth daily at bedtime  Qty: 30 capsule, Refills: 0    Associated Diagnoses: Multiple sclerosis (HCC)      latanoprost (XALATAN) 0.005 % ophthalmic solution Administer 1 drop to both eyes daily at bedtime      lidocaine (LIDODERM) 5 % Apply 1 patch topically over 12 hours daily Remove & Discard patch within 12 hours or as directed by MD    Associated Diagnoses: Fall, initial encounter      melatonin 3 mg Take 3 mg by mouth daily at bedtime as needed      metFORMIN (GLUCOPHAGE) 1000 MG tablet Take 1,000 mg by mouth 2 (two) times a day with meals      methenamine hippurate (HIPREX) 1 g tablet Take 1 tablet (1 g total) by mouth 2 (two) times a day with meals  Qty: 180 tablet, Refills: 3    Associated Diagnoses: Chronic suprapubic catheter (HCC); Cystitis      mirtazapine (REMERON) 7.5 MG tablet Take 7.5 mg by mouth daily at bedtime      pantoprazole (PROTONIX) 40 mg tablet Take 40 mg by mouth daily      polyethylene glycol (MIRALAX) 17 g packet Take 17 g by mouth daily    Associated Diagnoses: Stercoral colitis      propranolol (INDERAL LA) 60 mg 24 hr capsule Take 60 mg by mouth daily      senna-docusate sodium (SENOKOT S) 8.6-50 mg per tablet Take 2 tablets by mouth daily at bedtime       !! - Potential duplicate medications found. Please discuss with provider.          No discharge procedures on file.    PDMP Review         Value Time User    PDMP Reviewed  Yes 4/1/2024  3:21 AM Ar Beltran DO            ED Provider  Electronically Signed by             Mario Sosa MD  06/15/24 0113

## 2024-06-15 NOTE — ASSESSMENT & PLAN NOTE
"Patient reporting congestion, cough, shortness of breath, nausea, and sore throat for several days  CT chest \"Bibasilar dependent subsegmental atelectasis. Linear scarring in the lingula. No tracheal or endobronchial lesion.\"  Procalc <0.05  Suspect viral URI  Will check COVID/Flu/RSV  "

## 2024-06-15 NOTE — ASSESSMENT & PLAN NOTE
"Patient with PMHx of MS, DM2, HTN, HLD, GERSON, and CHF presented to the ED secondary to generalized weakness, shortness of breath, and nausea x several days  Pt fulfilling SIRS with WBC 12.30 and tachycardia  Procalc <0.05  Lactic 1.3  Blood cultures pending   CT chest/a/p demonstrating \"Decompressed urinary bladder with a suprapubic catheter in place. There is severe bladder wall thickening with perivesical fatty haziness suspicious for cystitis.\"  UA with +nitrites, evidence of infection  Urine culture pending  Continue cefepime  Urology consult pending   "

## 2024-06-15 NOTE — PLAN OF CARE
Problem: Prexisting or High Potential for Compromised Skin Integrity  Goal: Skin integrity is maintained or improved  Description: INTERVENTIONS:  - Identify patients at risk for skin breakdown  - Assess and monitor skin integrity  - Assess and monitor nutrition and hydration status  - Monitor labs   - Assess for incontinence   - Turn and reposition patient  - Assist with mobility/ambulation  - Relieve pressure over bony prominences  - Avoid friction and shearing  - Provide appropriate hygiene as needed including keeping skin clean and dry  - Evaluate need for skin moisturizer/barrier cream  - Collaborate with interdisciplinary team   - Patient/family teaching  - Consider wound care consult   Outcome: Progressing     Problem: SAFETY ADULT  Goal: Patient will remain free of falls  Description: INTERVENTIONS:  - Educate patient/family on patient safety including physical limitations  - Instruct patient to call for assistance with activity   - Consult OT/PT to assist with strengthening/mobility   - Keep Call bell within reach  - Keep bed low and locked with side rails adjusted as appropriate  - Keep care items and personal belongings within reach  - Initiate and maintain comfort rounds  - Make Fall Risk Sign visible to staff  - Offer Toileting every 2 Hours, in advance of need  - Initiate/Maintain bed alarm  - Obtain necessary fall risk management equipment  - Apply yellow socks and bracelet for high fall risk patients  - Consider moving patient to room near nurses station  Outcome: Progressing  Goal: Maintain or return to baseline ADL function  Description: INTERVENTIONS:  -  Assess patient's ability to carry out ADLs; assess patient's baseline for ADL function and identify physical deficits which impact ability to perform ADLs (bathing, care of mouth/teeth, toileting, grooming, dressing, etc.)  - Assess/evaluate cause of self-care deficits   - Assess range of motion  - Assess patient's mobility; develop plan if  impaired  - Assess patient's need for assistive devices and provide as appropriate  - Encourage maximum independence but intervene and supervise when necessary  - Involve family in performance of ADLs  - Assess for home care needs following discharge   - Consider OT consult to assist with ADL evaluation and planning for discharge  - Provide patient education as appropriate  Outcome: Progressing  Goal: Maintains/Returns to pre admission functional level  Description: INTERVENTIONS:  - Perform AM-PAC 6 Click Basic Mobility/ Daily Activity assessment daily.  - Set and communicate daily mobility goal to care team and patient/family/caregiver.   - Collaborate with rehabilitation services on mobility goals if consulted  - Perform Range of Motion 3 times a day.  - Reposition patient every 2 hours.  - Dangle patient 3 times a day  - Stand patient 3 times a day  - Ambulate patient 3 times a day  - Out of bed to chair 3 times a day   - Out of bed for meals 3 times a day  - Out of bed for toileting  - Record patient progress and toleration of activity level   Outcome: Progressing     Problem: GENITOURINARY - ADULT  Goal: Maintains or returns to baseline urinary function  Description: INTERVENTIONS:  - Assess urinary function  - Encourage oral fluids to ensure adequate hydration if ordered  - Administer IV fluids as ordered to ensure adequate hydration  - Administer ordered medications as needed  - Offer frequent toileting  - Follow urinary retention protocol if ordered  Outcome: Progressing  Goal: Absence of urinary retention  Description: INTERVENTIONS:  - Assess patient’s ability to void and empty bladder  - Monitor I/O  - Bladder scan as needed  - Discuss with physician/AP medications to alleviate retention as needed  - Discuss catheterization for long term situations as appropriate  Outcome: Progressing  Goal: Urinary catheter remains patent  Description: INTERVENTIONS:  - Assess patency of urinary catheter  - If patient  has a chronic dela cruz, consider changing catheter if non-functioning  - Follow guidelines for intermittent irrigation of non-functioning urinary catheter  Outcome: Progressing

## 2024-06-15 NOTE — PROCEDURES
2024    Mathew Rico  1950  [id number]    Diagnosis  Chief Complaint    Weakness - Generalized         Pre-operative Diagnosis: chronic urinary retention 2/2 MS      Post-operative Diagnosis: same    Time Out: Verbal timeout performed, patient confirmed name, , procedure. No laterality applicable.     Consent: Patient was agreeable and gave verbal consent before start of procedure.     Plan  Exchange SPT     Procedure: Dela Cruz Catheter Placement  Procedures    Patient placed in the supine position. Area prepped and draped with Betadine in the normal sterile fashion. Lidocaine urojet gel was introduced into the urethral meatus for lubrication. 24F latex dela crzu was inserted to the hub with minimal resistance. straw-yellow urine returned and 8 mL sterile water was placed in the balloon. Dela Cruz drained minimal straw-yellow urine as bladder was empty upon exchange. Patient tolerated the procedure well. Attached to gravity drainage bag.    Complications:  None; patient tolerated the procedure well.       Condition: stable      Plan  Maintain catheter to straight drainage, next exchange in 4 weeks.   Flush daily using Valeri syringe and 60 ml NSS. o further  intervention required.    Octavia Storey PA-C

## 2024-06-15 NOTE — ASSESSMENT & PLAN NOTE
S/p suprapubic cath for neurogenic bladder  Continue baclofen  Continue outpatient neurologic follow-up

## 2024-06-15 NOTE — ASSESSMENT & PLAN NOTE
"CT a/p demonstrating \"Marked rectal fecal impaction with wall thickening and perirectal inflammation consistent with stercoral proctitis. Large stool burden throughout the colon consistent with constipation.\"  Aggressive bowel regimen  Monitor for resolution  "

## 2024-06-15 NOTE — CONSULTS
"Consult - Urology   Mathew Rico 1950, 73 y.o. male MRN: [id number]    Unit/Bed#: E2 -01 Encounter: [id number]      Multiple sclerosis (HCC)  Assessment & Plan  Chronic SPT in place 2/2 neurogenic bladder  Continue baclofen  Continue outpatient neurologic follow-up     * Sepsis due to urinary tract infection  (HCC)  Assessment & Plan  Presented to [facility] ED 6/15 with generalized weakness, shortness of breath, and nausea  Met SIRS criteria with WBC 12.30 and tachycardia  UA + nitrites, UC pending  Creatinine stable   VSS, afebrile   CT chest/a/p demonstrating \"Decompressed urinary bladder with a suprapubic catheter in place. There is severe bladder wall thickening with perivesical fatty haziness suspicious for cystitis.\"  SPT exchanged today by urology as patient cannot recall last exchange date  Ensure proper drainage   Begin oxybutynin for bladder spasms   Continue antibiotic regiment and medical optimization per internal medicine team  Recommend SPT is exchanged every 4 weeks to decrease UTIs   No further urologic intervention needed at this time, urology will sign off      Subjective:   Mathew is a 73-year-old male with past medical history of diabetes, CHF, and MS with chronic SPT in place for neurogenic bladder--who presented to [facility] ED on 6/15 secondary to generalized weakness, shortness of breath, and nausea for several days.  Patient met SIRS criteria on admission with leukocytosis and tachycardia.  CT chest/A/P demonstrated decompressed urinary bladder with a suprapubic catheter in place, there is evidence of bladder wall thickening with fatty haziness suspicious for cystitis.  CT chest also endorses likely viral URI.  UA positive for nitrites, urine culture pending.  Patient initiated on cefepime prophylactically.  Creatinine stable at 0.82.  WBC elevated at 13.01.    On physical exam, 24 F SPT remains in place draining yellow urine, sediment in tubing.  SPT insertion site with mild erythema, " denies tenderness.  Patient states [facility] for them to change his SPT but it is not regular. He does not remember the last time it was exchanged. Visitng nurses also come to his home to assist with dressing changes as needed.  I personally changed the patient's SPT today (6/15/2024).  Continue medical optimization and antibiotic regiment per internal medicine team.  Oxybutynin added for ongoing bladder spasms. Recommend every 4 week changes of SPT moving forward. No further urologic intervention is needed at this time.    Urology will sign off but remain available for any further inpatient needs. Please feel free to contact the provider currently covering the Urology [facility] role for this campus with questions or concerns.    Review of Systems   Constitutional:  Positive for fatigue. Negative for activity change, chills and fever.   HENT:  Positive for congestion, rhinorrhea and sore throat.    Respiratory:  Positive for cough and shortness of breath.    Gastrointestinal:  Positive for nausea. Negative for abdominal distention, abdominal pain, constipation, diarrhea and vomiting.   Genitourinary:  Positive for difficulty urinating (chronic SPT in place). Negative for decreased urine volume, dysuria, flank pain, frequency, hematuria, penile pain, testicular pain and urgency.   Neurological:  Positive for weakness (generalized).   Psychiatric/Behavioral: Negative.         Objective:  Vitals: Blood pressure 113/64, pulse 66, temperature (!) 97 °F (36.1 °C), temperature source Temporal, resp. rate 18, weight 67.7 kg (149 lb 4 oz), SpO2 94%.,Body mass index is 23.38 kg/m².    Physical Exam  Vitals and nursing note reviewed.   Constitutional:       Appearance: Normal appearance.   HENT:      Head: Normocephalic and atraumatic.      Nose: Congestion present.      Mouth/Throat:      Pharynx: Oropharynx is clear.   Eyes:      Extraocular Movements: Extraocular movements intact.       Conjunctiva/sclera: Conjunctivae normal.   Cardiovascular:      Rate and Rhythm: Normal rate.   Pulmonary:      Effort: Pulmonary effort is normal.   Abdominal:      General: Abdomen is flat.      Palpations: Abdomen is soft.      Comments: SPT remains in place under gravity draining yellow urine, sediment visualized within Cage tubing and bag.  Erythema visualized surrounding SPT insertion site.  SPT exchanged 6/15/2024 by urology   Musculoskeletal:         General: Normal range of motion.      Cervical back: Normal range of motion.   Skin:     General: Skin is warm and dry.   Neurological:      General: No focal deficit present.      Mental Status: He is alert and oriented to person, place, and time.   Psychiatric:         Mood and Affect: Mood normal.         Behavior: Behavior normal.       Imaging:    CT CHEST, ABDOMEN AND PELVIS WITH IV CONTRAST    INDICATION: SOB, abdominal pain.    COMPARISON: CT chest, abdomen and pelvis on 4/12/2024.    TECHNIQUE: CT examination of the chest, abdomen and pelvis was performed. Multiplanar 2D reformatted images were created from the source data.    This examination, like all CT scans performed in the [facility], was performed utilizing techniques to minimize radiation dose exposure, including the use of iterative reconstruction and automated exposure control. Radiation dose length  product (DLP) for this visit: 671.62 mGy-cm    IV Contrast: 100 mL of iohexol (OMNIPAQUE)  Enteric Contrast: Not administered.    FINDINGS:    CHEST    LUNGS: Bibasilar dependent subsegmental atelectasis. Linear scarring in the lingula. No tracheal or endobronchial lesion.    PLEURA: Unremarkable.    HEART/GREAT VESSELS: Heart is unremarkable for patient's age. No thoracic aortic aneurysm. Aortic and coronary artery calcification.    MEDIASTINUM AND ERICA: Unremarkable.    CHEST WALL AND LOWER NECK: Unremarkable.    ABDOMEN    LIVER/BILIARY TREE: Unremarkable.    GALLBLADDER: No  calcified gallstones. No pericholecystic inflammatory change.    SPLEEN: Unremarkable.    PANCREAS: Unremarkable.    ADRENAL GLANDS: Unremarkable.    KIDNEYS/URETERS: No hydronephrosis or urinary tract calculi. Stable small left renal cysts. Subcentimeter hypoattenuating renal lesion(s), too small to characterize but statistically likely benign, which do not warrant follow-up (Radiology June 2019).    STOMACH AND BOWEL: Large stool burden throughout the colon. Marked rectal fecal impaction with wall thickening and perirectal inflammatory change.    APPENDIX: No findings to suggest appendicitis.    ABDOMINOPELVIC CAVITY: Mild presacral edema. No pneumoperitoneum. No lymphadenopathy.    VESSELS: Atherosclerosis without abdominal aortic aneurysm.    PELVIS    REPRODUCTIVE ORGANS: Unremarkable for patient's age.    URINARY BLADDER: Decompressed with a suprapubic catheter in place. Severe bladder wall thickening with perivesical fatty haziness. Small volume of intravesical air.    ABDOMINAL WALL/INGUINAL REGIONS: Tiny fat-containing bilateral inguinal hernias.    BONES: No acute fracture or suspicious osseous lesion.   Impression:       Marked rectal fecal impaction with wall thickening and perirectal inflammation consistent with stercoral proctitis.    Large stool burden throughout the colon consistent with constipation.    Decompressed urinary bladder with a suprapubic catheter in place. There is severe bladder wall thickening with perivesical fatty haziness suspicious for cystitis.    No acute intrathoracic process.     Imaging reviewed - both report and images personally reviewed.     Labs:  Recent Labs     06/14/24  1357 06/15/24  0634   WBC 12.30* 13.01*     Recent Labs     06/14/24  1357 06/15/24  0634   HGB 14.2 13.8       Recent Labs     06/14/24  1357 06/15/24  0856   CREATININE 0.91 0.82       Microbiology:  Urine culture pending  Blood cultures x 2 pending  Negative COVID/flu/RSV panel    History:  Social  History     Socioeconomic History    Marital status: Single     Spouse name: None    Number of children: None    Years of education: None    Highest education level: None   Occupational History    Occupation:    retired   Tobacco Use    Smoking status: Former     Current packs/day: 0.00     Average packs/day: 0.5 packs/day for 31.0 years (15.5 ttl pk-yrs)     Types: Cigarettes     Start date:      Quit date:      Years since quittin.4    Smokeless tobacco: Never   Vaping Use    Vaping status: Never Used   Substance and Sexual Activity    Alcohol use: Not Currently    Drug use: No    Sexual activity: Not Currently   Other Topics Concern    None   Social History Narrative    ** Merged History Encounter **          Social Determinants of Health     Financial Resource Strain: Not on file   Food Insecurity: No Food Insecurity (2024)    Hunger Vital Sign     Worried About Running Out of Food in the Last Year: Never true     Ran Out of Food in the Last Year: Never true   Transportation Needs: No Transportation Needs (2024)    PRAPARE - Transportation     Lack of Transportation (Medical): No     Lack of Transportation (Non-Medical): No   Physical Activity: Not on file   Stress: Not on file   Social Connections: Not on file   Intimate Partner Violence: Not on file   Housing Stability: Low Risk  (2024)    Housing Stability Vital Sign     Unable to Pay for Housing in the Last Year: No     Number of Times Moved in the Last Year: 1     Homeless in the Last Year: No       Past Medical History:   Diagnosis Date    Acute laryngitis     Acute nonsuppurative otitis media, unspecified laterality     Arm weakness     Arthritis     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HCC)     Bladder infection     Bronchitis     Constipation     Cough     Diabetes (HCC)     Diabetes mellitus (HCC)     Dizziness     Dysfunction of eustachian tube     Erectile dysfunction of non-organic origin     Fatigue     Glaucoma      Hiatal hernia     Hypertension     Imbalance     Leg muscle spasm     MS (multiple sclerosis) (HCC)     Multiple sclerosis (HCC)     Nephrolithiasis     Neurogenic bladder     No natural teeth     Sinus pain     Spinal stenosis     Strain of thoracic region     Stroke (HCC)     Suprapubic catheter (HCC)      Past Surgical History:   Procedure Laterality Date    APPENDECTOMY      BRAIN SURGERY      Coil placed in aneurysm    CEREBRAL ANEURYSM REPAIR      CYSTOSCOPY      CYSTOSCOPY      CYSTOSCOPY  06/11/2018    CYSTOSCOPY  01/15/2021    EYE SURGERY      transscleral cyclophotocoagulation noncontact YAG laser    IR SUPRAPUBIC CATHETER CHECK/CHANGE/REINSERTION/UPSIZE  3/28/2024    MYRINGOTOMY      with ventilation tube insertion    MD LITHOLAPAXY SMPL/SM <2.5 CM N/A 5/7/2019    Procedure: CYSTOSCOPY, holmium laser litholapaxy of bladder stones, EXCHANGE OF SP TUBE;  Surgeon: Javy Jeffries MD;  Location: [facility];  Service: Urology    SUPRAPUBIC CATHETER INSERTION       Family History   Problem Relation Age of Onset    Heart attack Mother     Stroke Mother     Heart attack Father     Anuerysm Father         In Stomach     Aneurysm Father        Octavia Storey PA-C  Date: 6/15/2024 Time: 11:52 AM

## 2024-06-15 NOTE — PLAN OF CARE
Problem: Prexisting or High Potential for Compromised Skin Integrity  Goal: Skin integrity is maintained or improved  Description: INTERVENTIONS:  - Identify patients at risk for skin breakdown  - Assess and monitor skin integrity  - Assess and monitor nutrition and hydration status  - Monitor labs   - Assess for incontinence   - Turn and reposition patient  - Assist with mobility/ambulation  - Relieve pressure over bony prominences  - Avoid friction and shearing  - Provide appropriate hygiene as needed including keeping skin clean and dry  - Evaluate need for skin moisturizer/barrier cream  - Collaborate with interdisciplinary team   - Patient/family teaching  - Consider wound care consult   Outcome: Progressing     Problem: SAFETY ADULT  Goal: Patient will remain free of falls  Description: INTERVENTIONS:  - Educate patient/family on patient safety including physical limitations  - Instruct patient to call for assistance with activity   - Consult OT/PT to assist with strengthening/mobility   - Keep Call bell within reach  - Keep bed low and locked with side rails adjusted as appropriate  - Keep care items and personal belongings within reach  - Initiate and maintain comfort rounds  - Make Fall Risk Sign visible to staff  - Offer Toileting every 2 Hours, in advance of need  - Initiate/Maintain bed alarm  - Obtain necessary fall risk management equipment:   - Apply yellow socks and bracelet for high fall risk patients  - Consider moving patient to room near nurses station  Outcome: Progressing     Problem: GENITOURINARY - ADULT  Goal: Maintains or returns to baseline urinary function  Description: INTERVENTIONS:  - Assess urinary function  - Encourage oral fluids to ensure adequate hydration if ordered  - Administer IV fluids as ordered to ensure adequate hydration  - Administer ordered medications as needed  - Offer frequent toileting  - Follow urinary retention protocol if ordered  Outcome: Progressing

## 2024-06-15 NOTE — H&P
"[facility]  H&P  Name: Mathew Rico 73 y.o. male I MRN: [id number]  Unit/Bed#: E2 -01 I Date of Admission: 6/14/2024   Date of Service: 6/15/2024 I Hospital Day: 1      Assessment & Plan   * Sepsis due to urinary tract infection  (HCC)  Assessment & Plan  Patient with PMHx of MS, DM2, HTN, HLD, GERSON, and CHF presented to the ED secondary to generalized weakness, shortness of breath, and nausea x several days  Pt fulfilling SIRS with WBC 12.30 and tachycardia  Procalc <0.05  Lactic 1.3  Blood cultures pending   CT chest/a/p demonstrating \"Decompressed urinary bladder with a suprapubic catheter in place. There is severe bladder wall thickening with perivesical fatty haziness suspicious for cystitis.\"  UA with +nitrites, evidence of infection  Urine culture pending  Continue cefepime  Urology consult pending     Symptoms of upper respiratory infection (URI)  Assessment & Plan  Patient reporting congestion, cough, shortness of breath, nausea, and sore throat for several days  CT chest \"Bibasilar dependent subsegmental atelectasis. Linear scarring in the lingula. No tracheal or endobronchial lesion.\"  Procalc <0.05  Suspect viral URI  Will check COVID/Flu/RSV    Constipation  Assessment & Plan  CT a/p demonstrating \"Marked rectal fecal impaction with wall thickening and perirectal inflammation consistent with stercoral proctitis. Large stool burden throughout the colon consistent with constipation.\"  Aggressive bowel regimen  Monitor for resolution    Chronic diastolic congestive heart failure (HCC)  Assessment & Plan  Wt Readings from Last 3 Encounters:   06/15/24 67.5 kg (148 lb 13 oz)   03/31/24 69.3 kg (152 lb 12.5 oz)   03/29/24 63 kg (138 lb 14.2 oz)     Pt appears euvolemic on exam  Last ECHO 10/18 EF 75% with grade 1 DD  Monitor daily weights, I/Os          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)  Assessment & Plan  Lab Results   Component Value Date    HGBA1C 6.4 (H) " "03/26/2024       Recent Labs     06/15/24  0100   POCGLU 250*       Blood Sugar Average: Last 72 hrs:  (P) 250  Hold metformin  SSI with accucheks  Continue gabapentin for neuropathy    Obstructive sleep apnea  Assessment & Plan  Continue cpap h.s.     Multiple sclerosis (HCC)  Assessment & Plan  S/p suprapubic cath for neurogenic bladder  Continue baclofen  Continue outpatient neurologic follow-up            VTE Pharmacologic Prophylaxis: VTE Score: 7 High Risk (Score >/= 5) - Pharmacological DVT Prophylaxis Ordered: enoxaparin (Lovenox). Sequential Compression Devices Ordered.  Code Status: Level 1 - Full Code   Discussion with family:  Update in AM.     Anticipated Length of Stay: Patient will be admitted on an inpatient basis with an anticipated length of stay of greater than 2 midnights secondary to Sepsis due to UTI.    Total Time Spent on Date of Encounter in care of patient: 75 mins. This time was spent on one or more of the following: performing physical exam; counseling and coordination of care; obtaining or reviewing history; documenting in the medical record; reviewing/ordering tests, medications or procedures; communicating with other healthcare professionals and discussing with patient's family/caregivers.    Chief Complaint: \"I have been feeling weak\"    History of Present Illness:  Mathew Rico is a 73 y.o. male who presents with sepsis due to UTI. Patient reports increased generalized weakness x several days. He also reports shortness of breath, cough, congestion, sore throat, and nausea over that time period. He denies any fever or chills. Pt denies any other symptoms such as chest pain, palpitations, diaphoresis, pedal edema, myalgias, headache, abdominal pain, vomiting or diarrhea. He reports he is unsure of when his last bowel movement was.     Review of Systems:  Review of Systems   Constitutional:  Positive for fatigue. Negative for appetite change, chills, diaphoresis and fever.   HENT:  " Positive for congestion, rhinorrhea and sore throat.    Eyes:  Negative for photophobia and visual disturbance.   Respiratory:  Positive for cough and shortness of breath. Negative for wheezing.    Cardiovascular:  Negative for chest pain, palpitations and leg swelling.   Gastrointestinal:  Positive for nausea. Negative for abdominal distention, abdominal pain, blood in stool, constipation, diarrhea and vomiting.   Genitourinary:  Negative for dysuria and hematuria.   Musculoskeletal:  Negative for arthralgias, back pain, myalgias and neck stiffness.   Skin:  Negative for color change and rash.   Neurological:  Positive for weakness (generalized). Negative for dizziness, seizures, syncope, light-headedness and headaches.   Psychiatric/Behavioral:  Negative for agitation, behavioral problems and confusion. The patient is not nervous/anxious.    All other systems reviewed and are negative.      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HCC)     Bladder infection     Bronchitis     Constipation     Cough     Diabetes (HCC)     Diabetes mellitus (HCC)     Dizziness     Dysfunction of eustachian tube     Erectile dysfunction of non-organic origin     Fatigue     Glaucoma     Hiatal hernia     Hypertension     Imbalance     Leg muscle spasm     MS (multiple sclerosis) (HCC)     Multiple sclerosis (HCC)     Nephrolithiasis     Neurogenic bladder     No natural teeth     Sinus pain     Spinal stenosis     Strain of thoracic region     Stroke ([facility])     Suprapubic catheter ([facility])        Past Surgical History:   Procedure Laterality Date    APPENDECTOMY      BRAIN SURGERY      Coil placed in aneurysm    CEREBRAL ANEURYSM REPAIR      CYSTOSCOPY      CYSTOSCOPY      CYSTOSCOPY  06/11/2018    CYSTOSCOPY  01/15/2021    EYE SURGERY      transscleral  cyclophotocoagulation noncontact YAG laser    IR SUPRAPUBIC CATHETER CHECK/CHANGE/REINSERTION/UPSIZE  3/28/2024    MYRINGOTOMY      with ventilation tube insertion    AL LITHOLAPAXY SMPL/SM <2.5 CM N/A 5/7/2019    Procedure: CYSTOSCOPY, holmium laser litholapaxy of bladder stones, EXCHANGE OF SP TUBE;  Surgeon: Javy Jeffries MD;  Location: [facility];  Service: Urology    SUPRAPUBIC CATHETER INSERTION         Meds/Allergies:  Prior to Admission medications    Medication Sig Start Date End Date Taking? Authorizing Provider   acetaminophen (TYLENOL) 325 mg tablet Take 2 tablets (650 mg total) by mouth every 6 (six) hours as needed for mild pain 3/29/24   Cristin Booth MD   acetaminophen (TYLENOL) 325 mg tablet Take 3 tablets (975 mg total) by mouth every 8 (eight) hours 4/2/24   SHA Sutton   albuterol (2.5 mg/3 mL) 0.083 % nebulizer solution Take 3 mL (2.5 mg total) by nebulization every 6 (six) hours as needed for wheezing or shortness of breath 10/3/23   Nelson Cabezas MD   amLODIPine-atorvastatin (CADUET) 10-80 MG per tablet Take 1 tablet by mouth daily    Historical Provider, MD   baclofen 10 mg tablet Take 5 mg by mouth 3 (three) times a day    Historical Provider, MD   bisacodyl (DULCOLAX) 10 mg suppository Insert 10 mg into the rectum daily as needed for constipation    Historical Provider, MD   budesonide-formoterol (SYMBICORT) 160-4.5 mcg/act inhaler Inhale 2 puffs 2 (two) times a day Rinse mouth after use.    Historical Provider, MD   clopidogrel (PLAVIX) 75 mg tablet Take 1 tablet (75 mg total) by mouth daily 10/27/18   Kraig Griffin MD   cyanocobalamin (VITAMIN B-12) 500 MCG tablet Take 1 tablet (500 mcg total) by mouth daily 4/2/24   SHA Sutton   Empagliflozin (JARDIANCE) 10 MG TABS tablet Take 10 mg by mouth every morning    Historical Provider, MD   Ergocalciferol (VITAMIN D2 PO) Take 50,000 Units by mouth once a week    Historical Provider, MD   gabapentin  (NEURONTIN) 300 mg capsule Take 1 capsule (300 mg total) by mouth 2 (two) times a day 6/3/21   Phan Monrealel, DO   gabapentin (NEURONTIN) 300 mg capsule Take 2 capsules (600 mg total) by mouth daily at bedtime 6/3/21   Phan Monrealel, DO   latanoprost (XALATAN) 0.005 % ophthalmic solution Administer 1 drop to both eyes daily at bedtime    Historical Provider, MD   lidocaine (LIDODERM) 5 % Apply 1 patch topically over 12 hours daily Remove & Discard patch within 12 hours or as directed by MD 4/3/24   SHA Stuton   melatonin 3 mg Take 3 mg by mouth daily at bedtime as needed    Historical Provider, MD   metFORMIN (GLUCOPHAGE) 1000 MG tablet Take 1,000 mg by mouth 2 (two) times a day with meals    Historical Provider, MD   methenamine hippurate (HIPREX) 1 g tablet Take 1 tablet (1 g total) by mouth 2 (two) times a day with meals 3/27/24   Brianna Ramirez PA-C   mirtazapine (REMERON) 7.5 MG tablet Take 7.5 mg by mouth daily at bedtime    Historical Provider, MD   pantoprazole (PROTONIX) 40 mg tablet Take 40 mg by mouth daily    Historical Provider, MD   polyethylene glycol (MIRALAX) 17 g packet Take 17 g by mouth daily 2/21/24   Mahad Perea MD   propranolol (INDERAL LA) 60 mg 24 hr capsule Take 60 mg by mouth daily    Historical Provider, MD   senna-docusate sodium (SENOKOT S) 8.6-50 mg per tablet Take 2 tablets by mouth daily at bedtime    Historical Provider, MD MAYFIELD have reviewed home medications using recent Epic encounter.    Allergies:   Allergies   Allergen Reactions    Cephalexin Rash    Cephalexin Rash       Social History:  Marital Status: Single   Patient Pre-hospital Living Situation: Home  Patient Pre-hospital Level of Mobility: manual wheelchair  Patient Pre-hospital Diet Restrictions: diabetic  Substance Use History:   Social History     Substance and Sexual Activity   Alcohol Use Not Currently     Social History     Tobacco Use   Smoking Status Former    Current packs/day: 0.00     Average packs/day: 0.5 packs/day for 31.0 years (15.5 ttl pk-yrs)    Types: Cigarettes    Start date:     Quit date:     Years since quittin.4   Smokeless Tobacco Never     Social History     Substance and Sexual Activity   Drug Use No       Family History:  Family History   Problem Relation Age of Onset    Heart attack Mother     Stroke Mother     Heart attack Father     Anuerysm Father         In Stomach     Aneurysm Father        Physical Exam:     Vitals:   Blood Pressure: 161/74 (24 2338)  Pulse: 100 (24)  Temperature: 97.8 °F (36.6 °C) (24)  Temp Source: Temporal (24)  Respirations: 16 (24)  Weight - Scale: 67.5 kg (148 lb 13 oz) (06/15/24 0036)  SpO2: 97 % (24)    Physical Exam  Vitals and nursing note reviewed.   Constitutional:       General: He is not in acute distress.     Appearance: He is well-developed.   HENT:      Head: Normocephalic and atraumatic.      Nose: Congestion present.      Mouth/Throat:      Mouth: Mucous membranes are dry.      Pharynx: Oropharynx is clear.   Eyes:      Conjunctiva/sclera: Conjunctivae normal.   Cardiovascular:      Rate and Rhythm: Normal rate and regular rhythm.      Heart sounds: Normal heart sounds. No murmur heard.     No friction rub. No gallop.   Pulmonary:      Effort: Pulmonary effort is normal. No respiratory distress.      Breath sounds: Normal breath sounds. No wheezing, rhonchi or rales.   Abdominal:      General: Bowel sounds are normal. There is no distension.      Palpations: Abdomen is soft.      Tenderness: There is no abdominal tenderness.   Genitourinary:     Comments: Suprapubic cath in place, irritation near entry  Musculoskeletal:      Cervical back: Neck supple.      Right lower leg: No edema.      Left lower leg: No edema.   Skin:     General: Skin is warm and dry.      Capillary Refill: Capillary refill takes less than 2 seconds.   Neurological:      General: No focal  deficit present.      Mental Status: He is alert and oriented to person, place, and time. Mental status is at baseline.   Psychiatric:         Mood and Affect: Mood normal.         Behavior: Behavior normal.          Additional Data:     Lab Results:  Results from last 7 days   Lab Units 06/14/24  1357   WBC Thousand/uL 12.30*   HEMOGLOBIN g/dL 14.2   HEMATOCRIT % 43.7   PLATELETS Thousands/uL 399*   SEGS PCT % 55   LYMPHO PCT % 30   MONO PCT % 7   EOS PCT % 7*     Results from last 7 days   Lab Units 06/14/24  1357   SODIUM mmol/L 135   POTASSIUM mmol/L 4.4   CHLORIDE mmol/L 103   CO2 mmol/L 25   BUN mg/dL 17   CREATININE mg/dL 0.91   ANION GAP mmol/L 7   CALCIUM mg/dL 9.9   ALBUMIN g/dL 3.7   TOTAL BILIRUBIN mg/dL 0.24   ALK PHOS U/L 95   ALT U/L 9   AST U/L 9*   GLUCOSE RANDOM mg/dL 329*         Results from last 7 days   Lab Units 06/15/24  0100   POC GLUCOSE mg/dl 250*     Lab Results   Component Value Date    HGBA1C 6.4 (H) 03/26/2024    HGBA1C 8.1 (H) 12/04/2023    HGBA1C 10.6 (H) 09/05/2023     Results from last 7 days   Lab Units 06/14/24  1357   LACTIC ACID mmol/L 1.2   PROCALCITONIN ng/ml <0.05       Lines/Drains:  Invasive Devices       Peripheral Intravenous Line  Duration             Peripheral IV 06/14/24 Dorsal (posterior);Right Forearm <1 day    Peripheral IV 06/14/24 Left;Ventral (anterior) Forearm <1 day              Drain  Duration             Suprapubic Catheter 20 Fr. 78 days                        Imaging: Reviewed radiology reports from this admission including: chest CT scan and abdominal/pelvic CT  CT chest abdomen pelvis w contrast   Final Result by Tosin King MD (06/14 2135)      Marked rectal fecal impaction with wall thickening and perirectal inflammation consistent with stercoral proctitis.      Large stool burden throughout the colon consistent with constipation.      Decompressed urinary bladder with a suprapubic catheter in place. There is severe bladder wall thickening with  perivesical fatty haziness suspicious for cystitis.      No acute intrathoracic process.      The study was marked in EPIC for immediate notification.               Workstation performed: [id number]             EKG and Other Studies Reviewed on Admission:   EKG: Sinus Tachycardia. .    ** Please Note: This note has been constructed using a voice recognition system. **

## 2024-06-15 NOTE — ASSESSMENT & PLAN NOTE
Chronic SPT in place 2/2 neurogenic bladder  Continue baclofen  Continue outpatient neurologic follow-up

## 2024-06-16 LAB
ANION GAP SERPL CALCULATED.3IONS-SCNC: 5 MMOL/L (ref 4–13)
B PARAP IS1001 DNA NPH QL NAA+NON-PROBE: NOT DETECTED
B PERT.PT PRMT NPH QL NAA+NON-PROBE: NOT DETECTED
BUN SERPL-MCNC: 16 MG/DL (ref 5–25)
C PNEUM DNA NPH QL NAA+NON-PROBE: NOT DETECTED
CALCIUM SERPL-MCNC: 9.9 MG/DL (ref 8.4–10.2)
CHLORIDE SERPL-SCNC: 106 MMOL/L (ref 96–108)
CO2 SERPL-SCNC: 25 MMOL/L (ref 21–32)
CREAT SERPL-MCNC: 1.03 MG/DL (ref 0.6–1.3)
ERYTHROCYTE [DISTWIDTH] IN BLOOD BY AUTOMATED COUNT: 13.6 % (ref 11.6–15.1)
EST. AVERAGE GLUCOSE BLD GHB EST-MCNC: 183 MG/DL
FLUAV RNA NPH QL NAA+NON-PROBE: NOT DETECTED
FLUBV RNA NPH QL NAA+NON-PROBE: NOT DETECTED
GFR SERPL CREATININE-BSD FRML MDRD: 71 ML/MIN/1.73SQ M
GLUCOSE SERPL-MCNC: 195 MG/DL (ref 65–140)
GLUCOSE SERPL-MCNC: 223 MG/DL (ref 65–140)
GLUCOSE SERPL-MCNC: 309 MG/DL (ref 65–140)
GLUCOSE SERPL-MCNC: 318 MG/DL (ref 65–140)
GLUCOSE SERPL-MCNC: 320 MG/DL (ref 65–140)
GLUCOSE SERPL-MCNC: 330 MG/DL (ref 65–140)
GLUCOSE SERPL-MCNC: 338 MG/DL (ref 65–140)
HADV DNA NPH QL NAA+NON-PROBE: NOT DETECTED
HBA1C MFR BLD: 8 %
HCOV 229E RNA NPH QL NAA+NON-PROBE: NOT DETECTED
HCOV HKU1 RNA NPH QL NAA+NON-PROBE: NOT DETECTED
HCOV NL63 RNA NPH QL NAA+NON-PROBE: NOT DETECTED
HCOV OC43 RNA NPH QL NAA+NON-PROBE: NOT DETECTED
HCT VFR BLD AUTO: 41.5 % (ref 36.5–49.3)
HGB BLD-MCNC: 13.2 G/DL (ref 12–17)
HMPV RNA NPH QL NAA+NON-PROBE: NOT DETECTED
HPIV1 RNA NPH QL NAA+NON-PROBE: NOT DETECTED
HPIV2 RNA NPH QL NAA+NON-PROBE: NOT DETECTED
HPIV3 RNA NPH QL NAA+NON-PROBE: NOT DETECTED
HPIV4 RNA NPH QL NAA+NON-PROBE: NOT DETECTED
M PNEUMO DNA NPH QL NAA+NON-PROBE: NOT DETECTED
MAGNESIUM SERPL-MCNC: 2.1 MG/DL (ref 1.9–2.7)
MCH RBC QN AUTO: 28.7 PG (ref 26.8–34.3)
MCHC RBC AUTO-ENTMCNC: 31.8 G/DL (ref 31.4–37.4)
MCV RBC AUTO: 90 FL (ref 82–98)
PLATELET # BLD AUTO: 339 THOUSANDS/UL (ref 149–390)
PMV BLD AUTO: 8.7 FL (ref 8.9–12.7)
POTASSIUM SERPL-SCNC: 4.3 MMOL/L (ref 3.5–5.3)
PROCALCITONIN SERPL-MCNC: <0.05 NG/ML
RBC # BLD AUTO: 4.6 MILLION/UL (ref 3.88–5.62)
RSV RNA NPH QL NAA+NON-PROBE: NOT DETECTED
RV+EV RNA NPH QL NAA+NON-PROBE: NOT DETECTED
SARS-COV-2 RNA NPH QL NAA+NON-PROBE: NOT DETECTED
SODIUM SERPL-SCNC: 136 MMOL/L (ref 135–147)
WBC # BLD AUTO: 10.45 THOUSAND/UL (ref 4.31–10.16)

## 2024-06-16 PROCEDURE — 85027 COMPLETE CBC AUTOMATED: CPT | Performed by: STUDENT IN AN ORGANIZED HEALTH CARE EDUCATION/TRAINING PROGRAM

## 2024-06-16 PROCEDURE — 97163 PT EVAL HIGH COMPLEX 45 MIN: CPT

## 2024-06-16 PROCEDURE — 84145 PROCALCITONIN (PCT): CPT

## 2024-06-16 PROCEDURE — 82948 REAGENT STRIP/BLOOD GLUCOSE: CPT

## 2024-06-16 PROCEDURE — 80048 BASIC METABOLIC PNL TOTAL CA: CPT | Performed by: STUDENT IN AN ORGANIZED HEALTH CARE EDUCATION/TRAINING PROGRAM

## 2024-06-16 PROCEDURE — 99232 SBSQ HOSP IP/OBS MODERATE 35: CPT | Performed by: STUDENT IN AN ORGANIZED HEALTH CARE EDUCATION/TRAINING PROGRAM

## 2024-06-16 PROCEDURE — 83036 HEMOGLOBIN GLYCOSYLATED A1C: CPT

## 2024-06-16 PROCEDURE — 97167 OT EVAL HIGH COMPLEX 60 MIN: CPT

## 2024-06-16 PROCEDURE — 83735 ASSAY OF MAGNESIUM: CPT | Performed by: STUDENT IN AN ORGANIZED HEALTH CARE EDUCATION/TRAINING PROGRAM

## 2024-06-16 RX ORDER — INSULIN GLARGINE 100 [IU]/ML
10 INJECTION, SOLUTION SUBCUTANEOUS
Status: DISCONTINUED | OUTPATIENT
Start: 2024-06-16 | End: 2024-06-18 | Stop reason: HOSPADM

## 2024-06-16 RX ORDER — ECHINACEA PURPUREA EXTRACT 125 MG
1 TABLET ORAL
Status: DISCONTINUED | OUTPATIENT
Start: 2024-06-16 | End: 2024-06-18 | Stop reason: HOSPADM

## 2024-06-16 RX ADMIN — GABAPENTIN 300 MG: 300 CAPSULE ORAL at 08:17

## 2024-06-16 RX ADMIN — BUDESONIDE AND FORMOTEROL FUMARATE DIHYDRATE 2 PUFF: 160; 4.5 AEROSOL RESPIRATORY (INHALATION) at 08:22

## 2024-06-16 RX ADMIN — CLOPIDOGREL BISULFATE 75 MG: 75 TABLET ORAL at 08:17

## 2024-06-16 RX ADMIN — INSULIN LISPRO 4 UNITS: 100 INJECTION, SOLUTION INTRAVENOUS; SUBCUTANEOUS at 11:56

## 2024-06-16 RX ADMIN — MICONAZOLE NITRATE: 20 CREAM TOPICAL at 17:10

## 2024-06-16 RX ADMIN — MIRTAZAPINE 7.5 MG: 7.5 TABLET, FILM COATED ORAL at 22:03

## 2024-06-16 RX ADMIN — INSULIN LISPRO 1 UNITS: 100 INJECTION, SOLUTION INTRAVENOUS; SUBCUTANEOUS at 08:20

## 2024-06-16 RX ADMIN — ACETAMINOPHEN 975 MG: 325 TABLET, FILM COATED ORAL at 16:00

## 2024-06-16 RX ADMIN — INSULIN LISPRO 4 UNITS: 100 INJECTION, SOLUTION INTRAVENOUS; SUBCUTANEOUS at 22:02

## 2024-06-16 RX ADMIN — AMLODIPINE BESYLATE 10 MG: 10 TABLET ORAL at 08:17

## 2024-06-16 RX ADMIN — GUAIFENESIN 600 MG: 600 TABLET, EXTENDED RELEASE ORAL at 22:03

## 2024-06-16 RX ADMIN — GABAPENTIN 300 MG: 300 CAPSULE ORAL at 12:00

## 2024-06-16 RX ADMIN — DEXTROSE 1000 MG: 50 INJECTION, SOLUTION INTRAVENOUS at 22:04

## 2024-06-16 RX ADMIN — OXYBUTYNIN CHLORIDE 5 MG: 5 TABLET ORAL at 22:03

## 2024-06-16 RX ADMIN — MICONAZOLE NITRATE: 20 CREAM TOPICAL at 08:26

## 2024-06-16 RX ADMIN — BACLOFEN 5 MG: 10 TABLET ORAL at 08:18

## 2024-06-16 RX ADMIN — INSULIN LISPRO 3 UNITS: 100 INJECTION, SOLUTION INTRAVENOUS; SUBCUTANEOUS at 17:07

## 2024-06-16 RX ADMIN — OXYBUTYNIN CHLORIDE 5 MG: 5 TABLET ORAL at 17:05

## 2024-06-16 RX ADMIN — BUDESONIDE AND FORMOTEROL FUMARATE DIHYDRATE 2 PUFF: 160; 4.5 AEROSOL RESPIRATORY (INHALATION) at 17:06

## 2024-06-16 RX ADMIN — ACETAMINOPHEN 975 MG: 325 TABLET, FILM COATED ORAL at 22:03

## 2024-06-16 RX ADMIN — BACLOFEN 5 MG: 10 TABLET ORAL at 22:03

## 2024-06-16 RX ADMIN — INSULIN GLARGINE 10 UNITS: 100 INJECTION, SOLUTION SUBCUTANEOUS at 22:03

## 2024-06-16 RX ADMIN — METHENAMINE HIPPURATE 1 G: 1000 TABLET ORAL at 17:07

## 2024-06-16 RX ADMIN — GUAIFENESIN 600 MG: 600 TABLET, EXTENDED RELEASE ORAL at 08:17

## 2024-06-16 RX ADMIN — ATORVASTATIN CALCIUM 80 MG: 80 TABLET, FILM COATED ORAL at 17:05

## 2024-06-16 RX ADMIN — PROPRANOLOL HYDROCHLORIDE 60 MG: 60 CAPSULE, EXTENDED RELEASE ORAL at 08:23

## 2024-06-16 RX ADMIN — BACLOFEN 5 MG: 10 TABLET ORAL at 17:05

## 2024-06-16 RX ADMIN — PANTOPRAZOLE SODIUM 40 MG: 40 TABLET, DELAYED RELEASE ORAL at 06:16

## 2024-06-16 RX ADMIN — POLYETHYLENE GLYCOL 3350 17 G: 17 POWDER, FOR SOLUTION ORAL at 08:17

## 2024-06-16 RX ADMIN — OXYBUTYNIN CHLORIDE 5 MG: 5 TABLET ORAL at 08:17

## 2024-06-16 RX ADMIN — ENOXAPARIN SODIUM 40 MG: 40 INJECTION SUBCUTANEOUS at 08:17

## 2024-06-16 RX ADMIN — METHENAMINE HIPPURATE 1 G: 1000 TABLET ORAL at 08:23

## 2024-06-16 RX ADMIN — CEFEPIME 2000 MG: 2 INJECTION, POWDER, FOR SOLUTION INTRAVENOUS at 11:00

## 2024-06-16 RX ADMIN — GABAPENTIN 600 MG: 300 CAPSULE ORAL at 22:03

## 2024-06-16 RX ADMIN — SENNOSIDES AND DOCUSATE SODIUM 2 TABLET: 50; 8.6 TABLET ORAL at 22:03

## 2024-06-16 NOTE — ASSESSMENT & PLAN NOTE
"CT a/p demonstrating \"Marked rectal fecal impaction with wall thickening and perirectal inflammation consistent with stercoral proctitis. Large stool burden throughout the colon consistent with constipation.\"  Continue bowerl regimen  "

## 2024-06-16 NOTE — PLAN OF CARE
Problem: OCCUPATIONAL THERAPY ADULT  Goal: Performs self-care activities at highest level of function for planned discharge setting.  See evaluation for individualized goals.  Description: Treatment Interventions: ADL retraining, Functional transfer training, Endurance training, Cognitive reorientation, Patient/family training, Equipment evaluation/education, Continued evaluation, Activityengagement          See flowsheet documentation for full assessment, interventions and recommendations.   Note: Limitation: Decreased ADL status, Decreased cognition, Decreased endurance, Decreased self-care trans, Decreased high-level ADLs  Prognosis: Fair  Assessment: Pt 73 y.o. male presenting to [facility] with c/o weakness and SOB. CT chest abdomen pelvis (+) Marked rectal fecal impaction with wall thickening and perirectal inflammation consistent with stercoral proctitis; Decompressed urinary bladder with a suprapubic catheter in place, there is severe bladder wall thickening with perivesical fatty haziness suspicious for cystitis. Pt admitted with sepsis due to UTI. Pt with PMH CHF, Type 2 DM, Obstructive Sleep Apnea, MS, Arthritis, Neurogenic Bladder, Spinal Stenosis, Stroke, Suprapubic Catheter, HTN. Pt with active OT orders to assess functional status and D/C planning. Pt with activity orders for activity as tolerated. Pt poor historian and reports living with brother and sister in a 1 level house with a ramped entrance. Pt poor historian and reports PTA required A with ADLs, A with IADLs, A with functional transfers to stand pivot into w/c. Pt unable to recall if able to transfer recently or using sit to stand lift for transfers. (-) . (+) falls. Upon evaluation pt currently functioning at Supervision for UB ADLs, Max A for LB ADLs, Total A for toileting, Mod Ax2 for bed mobility, and Max Ax2 for functional transfers with use of quick move device. Pt with the following performance deficits; weakness,  decreased strength , decreased balance, decreased activity tolerance, limited functional reach, impaired memory, increased pain, lethargy , decreased postural control, and spasticity .  Pt also with the following personal factors; difficulty performing ADLs, difficulty performing transfers/mobility, limited insight into deficits, fall risk , functional decline , and multiple admissions . Based on pt current functional status, pt would benefit from Level II (moderate resource intensity) at D/C. The patient's raw score on the AM-PAC Daily Activity Inpatient Short Form is 15. A raw score of less than 19 suggests the patient may benefit from discharge to post-acute rehabilitation services. Please refer to the recommendation of the Occupational Therapist for safe discharge planning.  Pt will continue to be seen 2-3x/week for skilled OT services while admitted to improve current functional status and performance with ADLs, transfers/mobility, endurance, activity tolerance, UE strength, cognition.     Rehab Resource Intensity Level, OT: II (Moderate Resource Intensity)

## 2024-06-16 NOTE — PLAN OF CARE
Problem: Prexisting or High Potential for Compromised Skin Integrity  Goal: Skin integrity is maintained or improved  Description: INTERVENTIONS:  - Identify patients at risk for skin breakdown  - Assess and monitor skin integrity  - Assess and monitor nutrition and hydration status  - Monitor labs   - Assess for incontinence   - Turn and reposition patient  - Assist with mobility/ambulation  - Relieve pressure over bony prominences  - Avoid friction and shearing  - Provide appropriate hygiene as needed including keeping skin clean and dry  - Evaluate need for skin moisturizer/barrier cream  - Collaborate with interdisciplinary team   - Patient/family teaching  - Consider wound care consult   Outcome: Progressing     Problem: SAFETY ADULT  Goal: Patient will remain free of falls  Description: INTERVENTIONS:  - Educate patient/family on patient safety including physical limitations  - Instruct patient to call for assistance with activity   - Consult OT/PT to assist with strengthening/mobility   - Keep Call bell within reach  - Keep bed low and locked with side rails adjusted as appropriate  - Keep care items and personal belongings within reach  - Initiate and maintain comfort rounds  - Make Fall Risk Sign visible to staff  - Apply yellow socks and bracelet for high fall risk patients  - Consider moving patient to room near nurses station  Outcome: Progressing  Goal: Maintain or return to baseline ADL function  Description: INTERVENTIONS:  -  Assess patient's ability to carry out ADLs; assess patient's baseline for ADL function and identify physical deficits which impact ability to perform ADLs (bathing, care of mouth/teeth, toileting, grooming, dressing, etc.)  - Assess/evaluate cause of self-care deficits   - Assess range of motion  - Assess patient's mobility; develop plan if impaired  - Assess patient's need for assistive devices and provide as appropriate  - Encourage maximum independence but intervene and  supervise when necessary  - Involve family in performance of ADLs  - Assess for home care needs following discharge   - Consider OT consult to assist with ADL evaluation and planning for discharge  - Provide patient education as appropriate  Outcome: Progressing  Goal: Maintains/Returns to pre admission functional level  Description: INTERVENTIONS:  - Perform AM-PAC 6 Click Basic Mobility/ Daily Activity assessment daily.  - Set and communicate daily mobility goal to care team and patient/family/caregiver.   - Collaborate with rehabilitation services on mobility goals if consulted  - Out of bed for toileting  - Record patient progress and toleration of activity level   Outcome: Progressing     Problem: GENITOURINARY - ADULT  Goal: Maintains or returns to baseline urinary function  Description: INTERVENTIONS:  - Assess urinary function  - Encourage oral fluids to ensure adequate hydration if ordered  - Administer IV fluids as ordered to ensure adequate hydration  - Administer ordered medications as needed  - Offer frequent toileting  - Follow urinary retention protocol if ordered  Outcome: Progressing  Goal: Absence of urinary retention  Description: INTERVENTIONS:  - Assess patient’s ability to void and empty bladder  - Monitor I/O  - Bladder scan as needed  - Discuss with physician/AP medications to alleviate retention as needed  - Discuss catheterization for long term situations as appropriate  Outcome: Progressing  Goal: Urinary catheter remains patent  Description: INTERVENTIONS:  - Assess patency of urinary catheter  - If patient has a chronic dela cruz, consider changing catheter if non-functioning  - Follow guidelines for intermittent irrigation of non-functioning urinary catheter  Outcome: Progressing

## 2024-06-16 NOTE — PROGRESS NOTES
"[facility]  Progress Note  Name: Mathew Rico I  MRN: [id number]  Unit/Bed#: E2 -01 I Date of Admission: 6/14/2024   Date of Service: 6/16/2024 I Hospital Day: 2    Assessment & Plan   * Sepsis due to urinary tract infection  (HCC)  Assessment & Plan  73 year old male with a history of MS, DM2, hypertension, hyperlipidemia, and CHF presented with shortness of breath, generalized weakness, and nausea.   CT chest/a/p demonstrating \"Decompressed urinary bladder with a suprapubic catheter in place. There is severe bladder wall thickening with perivesical fatty haziness suspicious for cystitis.\"  Cultures growing Staph Aureus.  Urology recommendations appreciated. S/p SPT replacement  History of allergies to Cephalexin, was on cefepime on presentation. Trial of Ceftriaxone this evening.   PT/OT recommending level 2 placement    Symptoms of upper respiratory infection (URI)  Assessment & Plan  Patient reporting congestion, cough, shortness of breath, nausea, and sore throat for several days  CT chest \"Bibasilar dependent subsegmental atelectasis. Linear scarring in the lingula. No tracheal or endobronchial lesion.\"  RP2 panel negative, no evidence of bacterial pneumonia at this time.    Results from last 7 days   Lab Units 06/16/24  0457 06/15/24  1559 06/15/24  0104 06/14/24  2057 06/14/24  1858 06/14/24  1357   PROCALCITONIN ng/ml <0.05  --   --   --   --  <0.05   BLOOD CULTURE   --   --   --   --  No Growth at 24 hrs. No Growth at 24 hrs.   URINE CULTURE   --   --   --  >100,000 cfu/ml Staphylococcus aureus*  --   --    SARS-COV-2   --  Not Detected Negative  --   --   --                Constipation  Assessment & Plan  CT a/p demonstrating \"Marked rectal fecal impaction with wall thickening and perirectal inflammation consistent with stercoral proctitis. Large stool burden throughout the colon consistent with constipation.\"  Continue bowerl regimen    Chronic diastolic congestive " heart failure (HCC)  Assessment & Plan  Wt Readings from Last 3 Encounters:   24 69.4 kg (153 lb)   24 69.3 kg (152 lb 12.5 oz)   24 63 kg (138 lb 14.2 oz)     Euvolemic. 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     Multiple sclerosis (HCC)  Assessment & Plan  S/p suprapubic catheter secondary to Neurogenic bladder due to Multiple sclerosis  Continue baclofen and outpatient neurology follow-up           VTE Pharmacologic Prophylaxis:   Pharmacologic: Enoxaparin (Lovenox)  Mechanical VTE Prophylaxis in Place: Yes    Discussions with Specialists or Other Care Team Provider: nursing    Education and Discussions with Family / Patient: patient    Current Length of Stay: 2 day(s)    Current Patient Status: Inpatient   Certification Statement: The patient will continue to require additional inpatient hospital stay due to iv antibiotics , dispo planning, await cultures    Discharge Plan: 24 to 48 hours    Code Status: Level 1 - Full Code      Subjective:   Patient seen and examined at bedside. No new complaints overnight.    Objective:     Vitals:   Temp (24hrs), Av °F (36.7 °C), Min:98 °F (36.7 °C), Max:98.1 °F (36.7 °C)    Temp:  [98 °F (36.7 °C)-98.1 °F (36.7 °C)] 98 °F (36.7 °C)  HR:  [66-76] 76  Resp:  [16-18] 17  BP: (114-117)/(54-61) 114/54  SpO2:  [95 %] 95 %  Body mass index is 23.96 kg/m².     Input and Output Summary (last 24 hours):       Intake/Output Summary (Last 24 hours) at 2024 3456  Last data filed  at 6/16/2024 0601  Gross per 24 hour   Intake --   Output 1100 ml   Net -1100 ml       Physical Exam:     Physical Exam  Vitals reviewed.   Constitutional:       General: He is not in acute distress.  HENT:      Head: Normocephalic.      Nose: Nose normal.      Mouth/Throat:      Mouth: Mucous membranes are moist.   Eyes:      General: No scleral icterus.  Cardiovascular:      Rate and Rhythm: Normal rate.   Pulmonary:      Effort: Pulmonary effort is normal. No respiratory distress.   Abdominal:      Palpations: Abdomen is soft.      Tenderness: There is no abdominal tenderness.      Comments: spt   Skin:     General: Skin is warm.   Neurological:      Mental Status: He is alert. Mental status is at baseline.   Psychiatric:         Mood and Affect: Mood normal.         Behavior: Behavior normal.       Additional Data:     Labs:    Results from last 7 days   Lab Units 06/16/24  0457 06/15/24  0634   WBC Thousand/uL 10.45* 13.01*   HEMOGLOBIN g/dL 13.2 13.8   HEMATOCRIT % 41.5 43.8   PLATELETS Thousands/uL 339 323   SEGS PCT %  --  54   LYMPHO PCT %  --  32   MONO PCT %  --  7   EOS PCT %  --  6     Results from last 7 days   Lab Units 06/16/24  0457 06/15/24  0856 06/14/24  1357   SODIUM mmol/L 136   < > 135   POTASSIUM mmol/L 4.3   < > 4.4   CHLORIDE mmol/L 106   < > 103   CO2 mmol/L 25   < > 25   BUN mg/dL 16   < > 17   CREATININE mg/dL 1.03   < > 0.91   ANION GAP mmol/L 5   < > 7   CALCIUM mg/dL 9.9   < > 9.9   ALBUMIN g/dL  --   --  3.7   TOTAL BILIRUBIN mg/dL  --   --  0.24   ALK PHOS U/L  --   --  95   ALT U/L  --   --  9   AST U/L  --   --  9*   GLUCOSE RANDOM mg/dL 223*   < > 329*    < > = values in this interval not displayed.         Results from last 7 days   Lab Units 06/16/24  1618 06/16/24  1617 06/16/24  1056 06/16/24  0614 06/15/24  2106 06/15/24  1536 06/15/24  1152 06/15/24  0624 06/15/24  0100   POC GLUCOSE mg/dl 309* 320* 330* 195* 274* 235* 282* 167* 250*     Results from last 7 days   Lab Units  06/16/24  0457   HEMOGLOBIN A1C % 8.0*     Results from last 7 days   Lab Units 06/16/24 0457 06/14/24  1357   LACTIC ACID mmol/L  --  1.2   PROCALCITONIN ng/ml <0.05 <0.05           * I Have Reviewed All Lab Data Listed Above.  * Additional Pertinent Lab Tests Reviewed: All Labs For Current Hospital Admission Reviewed    Mobility:  Basic Mobility Inpatient Raw Score: 7  [facility] Goal: 2: Bed activities/Dependent transfer  [facility] Achieved: 2: Bed activities/Dependent transfer    Lines:   Invasive Devices       Peripheral Intravenous Line  Duration             Peripheral IV 06/14/24 Dorsal (posterior);Right Forearm 1 day    Peripheral IV 06/14/24 Left;Ventral (anterior) Forearm 1 day              Drain  Duration             Suprapubic Catheter 24 Fr. 1 day                       Imaging:    Imaging Reports Reviewed Today Include: imaging since admission    Recent Cultures (last 7 days):     Results from last 7 days   Lab Units 06/14/24 2057 06/14/24  1858 06/14/24  1357   BLOOD CULTURE   --  No Growth at 24 hrs. No Growth at 24 hrs.   URINE CULTURE  >100,000 cfu/ml Staphylococcus aureus*  --   --        Last 24 Hours Medication List:   Current Facility-Administered Medications   Medication Dose Route Frequency Provider Last Rate    acetaminophen  975 mg Oral Q8H CIERA Beltran Santizo PA-C      albuterol  2.5 mg Nebulization Q6H PRN Beltran Santizo PA-C      amLODIPine  10 mg Oral Daily Beltran Santizo PA-C      And    atorvastatin  80 mg Oral Daily With Dinner Beltran Santizo PA-C      baclofen  5 mg Oral TID Beltran Santizo PA-C      benzonatate  100 mg Oral TID PRN Beltran Santizo PA-C      bisacodyl  10 mg Rectal Daily PRN Beltran Santizo PA-C      budesonide-formoterol  2 puff Inhalation BID Beltran Santizo PA-C      cefTRIAXone  1,000 mg Intravenous Q24H Sanjeev Concepcion MD      clopidogrel  75 mg Oral Daily Beltran Santizo PA-C      dextromethorphan-guaiFENesin  10 mL Oral Q4H PRN Beltran Santizo,  PA-KISHA      enoxaparin  40 mg Subcutaneous Daily Beltran Santizo, SHARI      gabapentin  300 mg Oral 2 times per day Beltran Santizo, SHARI      gabapentin  600 mg Oral HS Beltran Santizo, SHARI      guaiFENesin  600 mg Oral Q12H CIERA Beltran Santizo, PAFER      insulin glargine  10 Units Subcutaneous HS Sanjeev Concepcion MD      insulin lispro  1-5 Units Subcutaneous TID AC Beltran Santizo, SHARI      insulin lispro  1-5 Units Subcutaneous HS Beltran Santizo, SHARI      melatonin  3 mg Oral HS PRN Beltran Santizo, SHARI      methenamine hippurate  1 g Oral BID With Meals Beltran Santizo, SHARI      mirtazapine  7.5 mg Oral HS Beltran Santizo, SHARI      AMPARO ANTIFUNGAL   Topical BID Beltran Santizo, SHARI      oxybutynin  5 mg Oral TID Octavia Storey, SHARI      pantoprazole  40 mg Oral Daily Before Breakfast Beltran Santizo, SHARI      polyethylene glycol  17 g Oral Daily Beltran Santizo, SHARI      propranolol  60 mg Oral Daily Beltran Santizo, SHARI      senna-docusate sodium  2 tablet Oral HS Beltran Santizo, SHARI          Today, Patient Was Seen By: Sanjeev Concepcion MD    ** Please Note: Dictation voice to text software may have been used in the creation of this document. **

## 2024-06-16 NOTE — CASE MANAGEMENT
Case Management Assessment & Discharge Planning Note    Patient name Mathew Rico  [facility] /E2 -* MRN [id number]  : 1950 Date 2024       Current Admission Date: 2024  Current Admission Diagnosis:Sepsis due to urinary tract infection  (HCC)   Patient Active Problem List    Diagnosis Date Noted Date Diagnosed    Symptoms of upper respiratory infection (URI) 06/15/2024     Chest pain 2024     Acute encephalopathy 2024     Leukocytosis 2024     GERSON on CPAP 2024     Fall 2024     Primary hypertension 2024     Type 2 diabetes mellitus without complication, without long-term current use of insulin (HCC) 2024     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HCC) 2024     Multiple sclerosis (HCC) 2024     Urinary retention 2024     Neck pain 2024     Vitamin B deficiency 2024     Generalized weakness 2024     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     Fall 2023     Weakness 2023     Accidental fall from chair 2023     Sepsis due to urinary tract infection  (HCC) 2023     Constipation 2023     Chronic diastolic congestive heart failure (HCC) 2023     Hyponatremia 2023     Excessive daytime sleepiness 2022     Shortness of breath 2022     Pancreatic mass 2020     Pancreatic lesion 2020     Bladder stones 2019     Bladder neck contracture 2019     History of CVA (cerebrovascular accident) 10/21/2018     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HCC) 2017     Diabetic neuropathy (HCC) 2016     Neurogenic bladder 2016     Thyroid nodule 2015     Cervical spinal stenosis 2014     Generalized anxiety disorder 2014     Obstructive sleep apnea 2013     Benign colon polyp 2012     Esophageal reflux 2012     Fatty liver 2012     Glaucoma 2012     Hyperlipidemia 2012     Multiple sclerosis (HCC) 2012      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) 06/19/2012     Primary hypertension 06/11/2012       LOS (days): 2  Geometric Mean LOS (GMLOS) (days):   Days to GMLOS:     OBJECTIVE:    Risk of Unplanned Readmission Score: 51.6         Current admission status: Inpatient       Preferred Pharmacy:   [id number] [place] - [address]  [address] [address]  Phone: [phone] Fax: [phone]    [place], [place] - [address]  [address] [facility] [address]  Phone: [phone] Fax: [phone]    [facility] [place], [place] - [address],  [address]  [address] [facility] [address]  Phone: [phone] Fax: [phone]     [facility] - [place], [place] - [address]  [address] [facility] [address]  Phone: [phone] Fax: [phone]    Primary Care Provider: Carolina Kumari MD    Primary Insurance: [facility]  Secondary Insurance:     ASSESSMENT:  Active Health Care Proxies       Guzman Rico Health Care Representative - Brother   Primary Phone: [phone] (Home)                 Readmission Root Cause  30 Day Readmission: No    Patient Information  Admitted from:: Home  Mental Status: Alert  During Assessment patient was accompanied by: Brother, Friend  Assessment information provided by:: Brother  Primary Caregiver: Family  Caregiver's Name:: Guzman Rico (Brother) [phone]  Caregiver's Relationship to Patient:: Family Member  Caregiver's Telephone Number:: Guzman Rico (Brother) [phone]  Support Systems: Family members  County of Residence: [place]  What city do you live in?: [place]  Home entry access options. Select all that apply.: Stairs  Number of steps to enter home.: 1  Do the steps have railings?: No  Type of Current Residence: [facility]  Living Arrangements: Other (Comment) (Lives with Siblings)  Is patient a ?: No    Activities of Daily Living  Prior to Admission  Functional Status: Total dependent  Completes ADLs independently?: No  Level of ADL dependence: Total Dependent  Ambulates independently?: No  Level of ambulatory dependence: Total Dependent  Does patient use assisted devices?: Yes  Assisted Devices (DME) used: Wheelchair, Hospital Bed, Bedside Commode, Walker, Shower Chair, CPAP  DME Company Name (respiratory supplies): [employer]  Does patient currently own DME?: Yes (As listed above)  Does patient have a history of Outpatient Therapy (PT/OT)?: Yes ([facility])  Does the patient have a history of Short-Term Rehab?: Yes ([facility])  Does patient have a history of HHC?: Yes ([facility])  Does patient currently have HHC?: No    Patient Information Continued  Income Source: SSI/SSD  Does patient have prescription coverage?: Yes  Does patient receive dialysis treatments?: No  Does patient have a history of substance abuse?: No  Does patient have a history of Mental Health Diagnosis?: No    Means of Transportation  Means of Transport to [facility]:: Family transport      Social Determinants of Health (SDOH)      Flowsheet Row Most Recent Value   Housing Stability    In the last 12 months, was there a time when you were not able to pay the mortgage or rent on time? N   In the past 12 months, how many times have you moved where you were living? 0   At any time in the past 12 months, were you homeless or living in a shelter (including now)? N   Transportation Needs    In the past 12 months, has lack of transportation kept you from medical appointments or from getting medications? no   In the past 12 months, has lack of transportation kept you from meetings, work, or from getting things needed for daily living? No   Food Insecurity    Within the past 12 months, you worried that your food would run out before you got the money to buy more. Never true   Within the past 12 months, the food you bought just didn't last and you didn't have money to get  more. Never true   Utilities    In the past 12 months has the electric, gas, oil, or water company threatened to shut off services in your home? No            DISCHARGE DETAILS:    Discharge planning discussed with:: Guzman Robles) [phone]  Freedom of Choice: Yes  Comments - Freedom of Choice: Brother prefers Pt goes home w/ Home PT/OT. Brother is refusing STR.  CM contacted family/caregiver?: Yes  Were Treatment Team discharge recommendations reviewed with patient/caregiver?: Yes  Did patient/caregiver verbalize understanding of patient care needs?: Yes  Were patient/caregiver advised of the risks associated with not following Treatment Team discharge recommendations?: Yes    Contacts  Patient Contacts: Guzman Robles) [phone]  Relationship to Patient:: Family  Contact Method: Phone  Phone Number: Guzman Robles) [phone]  Reason/Outcome: Continuity of Care, Emergency Contact, Referral, Discharge Planning    Requested Home Health Care         Is the patient interested in HHC at discharge?: Yes  Home Health Discipline requested:: Nursing, Occupational Therapy, Physical Therapy  Home Health Agency Name:: Other ([facility])  HHA External Referral Reason (only applicable if external HHA name selected): Patient has established relationship with provider  Home Health Follow-Up Provider:: PCP  Home Health Services Needed:: Evaluate Functional Status and Safety, Gait/ADL Training, Heart Failure Management, Strengthening/Theraputic Exercises to Improve Function  Homebound Criteria Met:: Requires the Assistance of Another Person for Safe Ambulation or to Leave the Home, Uses an Assist Device (i.e. cane, walker, etc)  Supporting Clincal Findings:: Cognitive Deficit Requiring the Assistance of Others, Fatigues Easliy in Short Distances, Limited Endurance    Treatment Team Recommendation: Short Term Rehab  Discharge Destination Plan:: Home with Home Health Care  Transport at Discharge :  Wheelchair van, Stretcher van (Family requesting transport)    Additional Comments: CM notes [facility] Therapy Team's recommendation for STR. CM called Pt's Brother Guzman to review previous assessment for changes and to discuss the rehab recommendation. Guzman reports that he brought his brother home from rehab early as he was not happy with the rehabilitation program. Brother states his preference for home therapy, adding that there Pt is never alone and family auppliments the home therapy program through [facility]. No other changes otherwise. CM to contact [facility] to identify Pt's  to coordinate care once Pt is medically stable for discharge. No other needs or identified at this time. CM remains available through discharge.

## 2024-06-16 NOTE — PHYSICAL THERAPY NOTE
PHYSICAL THERAPY EVALUATION          Patient Name: Mathew Rico  Today's Date: 6/16/2024 06/16/24 1005   PT Last Visit   PT Visit Date 06/16/24   Note Type   Note type Evaluation   Pain Assessment   Pain Assessment Tool 0-10   Pain Score 9   Pain Location/Orientation Orientation: Bilateral;Location: Leg   Restrictions/Precautions   Other Precautions Contact/isolation;Cognitive;Chair Alarm;Bed Alarm;Multiple lines;Fall Risk;Pain   Home Living   Type of Home House   Home Layout One level;Ramped entrance   Bathroom Equipment Commode   Home Equipment Hospital bed;Walker;Wheelchair-manual;Mechanical lift   Additional Comments pt unreliable historian. reports sponge bathing but uses BSC for toileting   Prior Function   Level of [place] Needs assistance with ADLs;Needs assistance with functional mobility;Needs assistance with IADLS   Lives With Family  (brother and sister)   Receives Help From Family  (pt unsure if he has caregivers)   IADLs Family/Friend/Other provides transportation;Family/Friend/Other provides medication management;Family/Friend/Other provides meals   Falls in the last 6 months 1 to 4   Comments pt poor historian. reports able to perform stand pivot transfer however unsure if this was recently or not. on prev admissions pt required sit<>stand lift for transfers   General   Additional Pertinent History pt admitted 6/14/24 for sepsis dt UTI. activity as tolerated orders. PMHx significant for MS, T2DM, CHF, neuropathy, neurogenic bladder, anxiety, glaucoma, CVA   Cognition   Overall Cognitive Status Impaired   Arousal/Participation Cooperative   Orientation Level Oriented to person;Oriented to place;Disoriented to time   Memory Decreased recall of recent events;Decreased short term memory   Following Commands Follows one step commands without difficulty   RLE Assessment   RLE Assessment X  (increased tone noted)   LLE Assessment   LLE Assessment X  (increased tone  noted)   Bed Mobility   Supine to Sit 3  Moderate assistance   Additional items Assist x 2;HOB elevated;Bedrails;Increased time required;Verbal cues;LE management;Other  (trunk support)   Transfers   Sit to Stand 2  Maximal assistance   Additional items Assist x 2   Stand to Sit 2  Maximal assistance   Additional items Assist x 2   Mechanical lift 2  Maximal assistance   Additional items Assist x 2;Increased time required;Other  (quick move device)   Additional Comments unable to stand pivot at this time   Balance   Static Sitting Poor +   Dynamic Sitting Poor   Static Standing Zero   Activity Tolerance   Activity Tolerance Treatment limited secondary to medical complications (Comment);Patient tolerated treatment well;Patient limited by fatigue  (baseline weakness)   Medical Staff Made Aware Kary OTCherie ROQUE   Nurse Made Aware cleared for therapy. student RN updated end of session (RN off unit)   Assessment   Prognosis Fair   Problem List Decreased strength;Impaired balance;Decreased mobility;Decreased cognition;Impaired judgement;Decreased safety awareness;Impaired tone;Pain   Assessment Mathew Rico is a 73 y.o. male admitted to [facility] on 6/14/2024 for Sepsis due to urinary tract infection  (HCC). PT was consulted and pt was seen on 6/16/2024 for mobility assessment and d/c planning. Pt presents w high fall risk, contact isolation, multiple lines, pain. Poor historian; unclear LOF pta. Pt reports doing stand pivot transfers however unsure if that was recently; on prev admission pt require sit<>stand lift to transfers OOB. Pt is currently functioning at a mod Ax2 for bed mobility, max Ax2 for transfers. Unable to perform stand piv transfers at this time dt weakness, unsteadiness. Able to complete sit<>stand transfers w use of quick move. Did note fatigue w multiple transfer reps. Pt will benefit from continued skilled IP PT to address the above mentioned impairments  in order to maximize recovery and increase  functional independence when completing mobility and ADLs. The patient's AM-PAC Basic Mobility Inpatient Short Form Raw Score is 7. A Raw score of less than or equal to 16 suggests the patient may benefit from discharge to post-acute rehabilitation services. Unclear LOF however assume pt functioning near baseline. Defer to caregiver abilities to determine STR v HHPT upon dc.   Goals   [facility] Expiration Date 06/26/24   Short Term Goal #1 1).  Pt will perform bed mobility with mod Ax1 demonstrating appropriate technique 100% of the time in order to improve function. 2) Perform all transfers with mod Ax2 demonstrating safe and appropriate technique 100% of the time in order to improve ability to negotiate safely in home environment.3) Improve overall strength and balance 1/2 grade in order to optimize ability to perform functional tasks and reduce fall risk.5) Increase activity tolerance to 45 minutes in order to improve endurance to functional tasks.6) PT for ongoing patient and family/caregiver education, DME needs and d/c planning in order to promote highest level of function in least restrictive environment.   Plan   Treatment/Interventions Functional transfer training;LE strengthening/ROM;Therapeutic exercise;Cognitive reorientation;Patient/family training;Equipment eval/education;Bed mobility;Compensatory technique education;Continued evaluation;Spoke to nursing;OT   PT Frequency 2-3x/wk   Discharge Recommendation   Rehab Resource Intensity Level, PT II (Moderate Resource Intensity)  (STR>LTC vs HHPT pending caregiver abilities)   Additional Comments quick move oob   AM-PAC Basic Mobility Inpatient   Turning in Flat Bed Without Bedrails 2   Lying on Back to Sitting on Edge of Flat Bed Without Bedrails 1   Moving Bed to Chair 1   Standing Up From Chair Using Arms 1   Walk in Room 1   Climb 3-5 Stairs With Railing 1   Basic Mobility Inpatient Raw Score 7   Turning Head Towards Sound 3   Follow Simple Instructions 3    Low Function Basic Mobility Raw Score  13   Low Function Basic Mobility Standardized Score  20.14   [facility] Highest Level Of Mobility   -[facility] Goal 2: Bed activities/Dependent transfer   -[facility] Achieved 3: Sit at edge of bed   End of Consult   Patient Position at End of Consult Bedside chair;All needs within reach;Bed/Chair alarm activated   History: co - morbidities including age, social background, ?use of assistive device, assist for adl's/iadl's, cognition, current experience including fall risk, multiple lines, contact isolation  Exam: impairments in systems including multiple body structures involved; neuromuscular (balance, transfers), AM-PAC, cognition; activity limitations (difficulties executing an action); participation restrictions (problems associated w involvement in life situations)  Clinical: unstable/unpredictable  Complexity:high      Roma Larose, PT

## 2024-06-16 NOTE — ASSESSMENT & PLAN NOTE
Lab Results   Component Value Date    HGBA1C 8.0 (H) 06/16/2024       Recent Labs     06/16/24  0614 06/16/24  1056 06/16/24  1617 06/16/24  1618   POCGLU 195* 330* 320* 309*       Blood Sugar Average: Last 72 hrs:  (P) [id number]    Hold metformin  Sliding scale, Gabapentin for neuropathy  Start Glargine 10U HS

## 2024-06-16 NOTE — ASSESSMENT & PLAN NOTE
"Patient reporting congestion, cough, shortness of breath, nausea, and sore throat for several days  CT chest \"Bibasilar dependent subsegmental atelectasis. Linear scarring in the lingula. No tracheal or endobronchial lesion.\"  RP2 panel negative, no evidence of bacterial pneumonia at this time.    Results from last 7 days   Lab Units 06/16/24  0457 06/15/24  1559 06/15/24  0104 06/14/24  2057 06/14/24  1858 06/14/24  1357   PROCALCITONIN ng/ml <0.05  --   --   --   --  <0.05   BLOOD CULTURE   --   --   --   --  No Growth at 24 hrs. No Growth at 24 hrs.   URINE CULTURE   --   --   --  >100,000 cfu/ml Staphylococcus aureus*  --   --    SARS-COV-2   --  Not Detected Negative  --   --   --              "

## 2024-06-16 NOTE — OCCUPATIONAL THERAPY NOTE
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)    Chronic diastolic congestive heart failure (HCC)    Constipation    Symptoms of upper respiratory infection (URI)    Past Medical History  Past Medical History:   Diagnosis Date    Acute laryngitis     Acute nonsuppurative otitis media, unspecified laterality     Arm weakness     Arthritis     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HCC)     Bladder infection     Bronchitis     Constipation     Cough     Diabetes (HCC)     Diabetes mellitus (HCC)     Dizziness     Dysfunction of eustachian tube     Erectile dysfunction of non-organic origin     Fatigue     Glaucoma     Hiatal hernia     Hypertension     Imbalance     Leg muscle spasm     MS (multiple sclerosis) (HCC)     Multiple sclerosis (HCC)     Nephrolithiasis     Neurogenic bladder     No natural teeth     Sinus pain     Spinal stenosis     Strain of thoracic region     Stroke (HCC)     Suprapubic catheter (HCC)      Past Surgical History  Past Surgical History:   Procedure Laterality Date    APPENDECTOMY      BRAIN SURGERY      Coil placed in aneurysm    CEREBRAL ANEURYSM REPAIR      CYSTOSCOPY      CYSTOSCOPY      CYSTOSCOPY  06/11/2018    CYSTOSCOPY  01/15/2021    EYE SURGERY      transscleral cyclophotocoagulation noncontact YAG laser    IR SUPRAPUBIC CATHETER CHECK/CHANGE/REINSERTION/UPSIZE  3/28/2024    MYRINGOTOMY      with ventilation tube insertion    NH LITHOLAPAXY SMPL/SM <2.5 CM N/A 5/7/2019    Procedure: CYSTOSCOPY, holmium laser litholapaxy of bladder stones, EXCHANGE OF SP TUBE;  Surgeon: Javy Jeffries MD;  Location: [facility];  Service: Urology    SUPRAPUBIC CATHETER INSERTION           06/16/24 1006   OT Last Visit   OT Visit Date 06/16/24   Note Type    Note type Evaluation   Pain Assessment   Pain Assessment Tool 0-10   Pain Score 9   Pain Location/Orientation Orientation: Bilateral;Location: Leg   Patient's Stated Pain Goal No pain   Hospital Pain Intervention(s) Repositioned;Ambulation/increased activity;Emotional support;Rest   Multiple Pain Sites No   Restrictions/Precautions   Weight Bearing Precautions Per Order No   Other Precautions Contact/isolation;Cognitive;Chair Alarm;Bed Alarm;Multiple lines;Fall Risk;Pain   Home Living   Type of Home House   Home Layout One level;Performs ADLs on one level;Ramped entrance   Bathroom Shower/Tub Walk-in shower  (pt reports spongebathing at baseline)   Bathroom Toilet Standard   Bathroom Equipment Grab bars in shower;Shower chair;Commode   Home Equipment Walker;Wheelchair-manual;Mechanical lift;Hospital bed  (sit<>stand machine)   Additional Comments Pt poor historian and reports living with brother and sister in a 1 level house with a ramped entrance.   Prior Function   Level of [place] Needs assistance with ADLs;Needs assistance with functional mobility;Needs assistance with IADLS   Lives With Family  (Brother and sister)   Receives Help From Family;Other (Comment)  (Pt unable to recall if he has HHA/caregivers)   IADLs Family/Friend/Other provides transportation;Family/Friend/Other provides medication management;Family/Friend/Other provides meals   Falls in the last 6 months 1 to 4   Comments Pt poor historian and reports PTA required A with ADLs, A with IADLs, A with functional transfers to stand pivot into w/c. Pt unable to recall if able to transfer recently or using sit<>stand lift for transfers. (-) . (+) falls.   Lifestyle   Autonomy Pt poor historian and reports PTA required A with ADLs, A with IADLs, A with functional transfers to stand pivot into w/c. Pt unable to recall if able to transfer recently or using sit<>stand lift for transfers. (-) . (+) falls.   Reciprocal Relationships Brother  and sister   Service to Others retired   General   Family/Caregiver Present No   ADL   Where Assessed Chair   Eating Assistance 7  Independent   Grooming Assistance 5  Supervision/Setup   UB Bathing Assistance 5  Supervision/Setup   LB Bathing Assistance 2  Maximal Assistance   UB Dressing Assistance 5  Supervision/Setup   LB Dressing Assistance 2  Maximal Assistance   Toileting Assistance  1  Total Assistance   Bed Mobility   Supine to Sit 3  Moderate assistance   Additional items Assist x 2;HOB elevated;Bedrails;Increased time required;Verbal cues;LE management  (trunk support)   Sit to Supine Unable to assess   Additional Comments verbal cues for hand placement. Pt seated OOB in chair at end of session.   Transfers   Sit to Stand 2  Maximal assistance   Additional items Assist x 2   Stand to Sit 2  Maximal assistance   Additional items Assist x 2   Mechanical lift 2  Maximal assistance   Additional items Assist x 2;Increased time required  (use of quick move device)   Additional Comments Use of quick move device as pt currently unable to complete stand pivot transfer.   Functional Mobility   Additional Comments N/A. Pt unsafe for functional mobility at this time due to decreased strength and hypertonic in B/L LEs. Recommend quick move device for OOB to chair.   Balance   Static Sitting Poor +   Dynamic Sitting Poor   Static Standing Zero   Activity Tolerance   Activity Tolerance Patient limited by fatigue;Treatment limited secondary to medical complications (Comment)  (weakness and hypertonic in B/L LEs)   Medical Staff Made Aware Roma PT; Belle RN   Nurse Made Aware yes   RUE Assessment   RUE Assessment WFL  (4-/5 t/o)   LUE Assessment   LUE Assessment WFL  (4-/5 t/o)   Hand Function   Gross Motor Coordination Functional   Fine Motor Coordination Functional   Sensation   Light Touch No apparent deficits   Proprioception   Proprioception No apparent deficits   Vision-Basic Assessment   Current Vision Wears  glasses all the time   Patient Visual Report Other (Comment)  (pt reports current blurry vision in B/L eyes)   Vision - Complex Assessment   Ocular Range of Motion Intact   Psychosocial   Psychosocial (WDL) WDL   Perception   Inattention/Neglect Appears intact   Cognition   Overall Cognitive Status Impaired   Arousal/Participation Alert;Responsive;Cooperative   Attention Attends with cues to redirect   Orientation Level Oriented to person;Oriented to place;Disoriented to time   Memory Decreased short term memory;Decreased recall of recent events;Decreased recall of precautions   Following Commands Follows one step commands without difficulty   Comments Pt may benefit from further cognitive assessment.   Assessment   Limitation Decreased ADL status;Decreased cognition;Decreased endurance;Decreased self-care trans;Decreased high-level ADLs   Prognosis Fair   Assessment Pt 73 y.o. male presenting [facility] with c/o weakness and SOB. CT chest abdomen pelvis (+) Marked rectal fecal impaction with wall thickening and perirectal inflammation consistent with stercoral proctitis; Decompressed urinary bladder with a suprapubic catheter in place, there is severe bladder wall thickening with perivesical fatty haziness suspicious for cystitis. Pt admitted with sepsis due to UTI. Pt with PMH CHF, Type 2 DM, Obstructive Sleep Apnea, MS, Arthritis, Neurogenic Bladder, Spinal Stenosis, Stroke, Suprapubic Catheter, HTN. Pt with active OT orders to assess functional status and D/C planning. Pt with activity orders for activity as tolerated. Pt poor historian and reports living with brother and sister in a 1 level house with a ramped entrance. Pt poor historian and reports PTA required A with ADLs, A with IADLs, A with functional transfers to stand pivot into w/c. Pt unable to recall if able to transfer recently or using sit to stand lift for transfers. (-) . (+) falls. Upon evaluation pt currently functioning at  Supervision for UB ADLs, Max A for LB ADLs, Total A for toileting, Mod Ax2 for bed mobility, and Max Ax2 for functional transfers with use of quick move device. Pt with the following performance deficits; weakness, decreased strength , decreased balance, decreased activity tolerance, limited functional reach, impaired memory, increased pain, lethargy , decreased postural control, and spasticity .  Pt also with the following personal factors; difficulty performing ADLs, difficulty performing transfers/mobility, limited insight into deficits, fall risk , functional decline , and multiple admissions . Based on pt current functional status, pt would benefit from Level II (moderate resource intensity) at D/C. The patient's raw score on the AM-PAC Daily Activity Inpatient Short Form is 15. A raw score of less than 19 suggests the patient may benefit from discharge to post-acute rehabilitation services. Please refer to the recommendation of the Occupational Therapist for safe discharge planning.  Pt will continue to be seen 2-3x/week for skilled OT services while admitted to improve current functional status and performance with ADLs, transfers/mobility, endurance, activity tolerance, UE strength, cognition.   Goals   Patient Goals none stated due to cognitive status   LTG Time Frame 10-14   Long Term Goal #1 Please see LTGs listed below   Plan   Treatment Interventions ADL retraining;Functional transfer training;Endurance training;Cognitive reorientation;Patient/family training;Equipment evaluation/education;Continued evaluation;Activityengagement   Goal Expiration Date 06/30/24   OT Treatment Day 0   OT Frequency 2-3x/wk   Discharge Recommendation   Rehab Resource Intensity Level, OT II (Moderate Resource Intensity)   AM-PAC Daily Activity Inpatient   Lower Body Dressing 2   Bathing 2   Toileting 1   Upper Body Dressing 3   Grooming 3   Eating 4   Daily Activity Raw Score 15   Daily Activity Standardized Score (Calc for  Raw Score >=11) 34.69   AM-PAC Applied Cognition Inpatient   Following a Speech/Presentation 3   Understanding Ordinary Conversation 3   Taking Medications 1   Remembering Where Things Are Placed or Put Away 2   Remembering List of 4-5 Errands 1   Taking Care of Complicated Tasks 1   Applied Cognition Raw Score 11   Applied Cognition Standardized Score 27.03   End of Consult   Patient Position at End of Consult Bedside chair;Bed/Chair alarm activated;All needs within reach     GOALS:  Pt will improve activity tolerance to good for 30 min txment sessions to increase engagement in functional tasks  Pt will complete LB ADLs with Min A with proper technique and use of LH AE with DME as needed for balance/safety.  Pt will complete UB ADLs with Mod I with proper technique and use of with DME as needed for balance/safety.  Pt will complete toileting Mod A with good hygiene with DME as needed for balance/safety  Pt will complete bed mobility Min A with Good balance and safety to decrease required assistance.  Pt will complete functional transfers Mod A on/off all surfaces with use of DME as needed for balance/safety  Pt will correctly verbalize/identify 3 fall hazards and compensatory strategies to decrease fall risk in home environment.   Pt will increase dynamic sitting endurance to complete functional task for 5-8 minutes with Fair- sitting balance with use of DME.  Pt will demonstrate 50% carryover of education and safe technique w/o cues for participation in functional tasks.  Pt will improve UE strength by 1MM to improve participation in functional mobility/transfers.   Pt will be attentive 50% of the time during ongoing cognitive assessment w/ G participation to assist w/ safe d/c planning/recommendations.  Pt will demonstrate ability to perform pressure relief techniques (ie: weight shifts, frequent changes in position, placement of positioning devices- pillows, wedges, etc) at a Min A level after education from  therapist     Katherin Buckley, OTS

## 2024-06-16 NOTE — ASSESSMENT & PLAN NOTE
S/p suprapubic catheter secondary to Neurogenic bladder due to Multiple sclerosis  Continue baclofen and outpatient neurology follow-up

## 2024-06-16 NOTE — PLAN OF CARE
Problem: Prexisting or High Potential for Compromised Skin Integrity  Goal: Skin integrity is maintained or improved  Description: INTERVENTIONS:  - Identify patients at risk for skin breakdown  - Assess and monitor skin integrity  - Assess and monitor nutrition and hydration status  - Monitor labs   - Assess for incontinence   - Turn and reposition patient  - Assist with mobility/ambulation  - Relieve pressure over bony prominences  - Avoid friction and shearing  - Provide appropriate hygiene as needed including keeping skin clean and dry  - Evaluate need for skin moisturizer/barrier cream  - Collaborate with interdisciplinary team   - Patient/family teaching  - Consider wound care consult   Outcome: Progressing     Problem: SAFETY ADULT  Goal: Patient will remain free of falls  Description: INTERVENTIONS:  - Educate patient/family on patient safety including physical limitations  - Instruct patient to call for assistance with activity   - Consult OT/PT to assist with strengthening/mobility   - Keep Call bell within reach  - Keep bed low and locked with side rails adjusted as appropriate  - Keep care items and personal belongings within reach  - Initiate and maintain comfort rounds  - Make Fall Risk Sign visible to staff  - Offer Toileting every 2 Hours, in advance of need  - Initiate/Maintain bed alarm  - Obtain necessary fall risk management equipment: bed alarm  - Apply yellow socks and bracelet for high fall risk patients  - Consider moving patient to room near nurses station  Outcome: Progressing     Problem: GENITOURINARY - ADULT  Goal: Maintains or returns to baseline urinary function  Description: INTERVENTIONS:  - Assess urinary function  - Encourage oral fluids to ensure adequate hydration if ordered  - Administer IV fluids as ordered to ensure adequate hydration  - Administer ordered medications as needed  - Offer frequent toileting  - Follow urinary retention protocol if ordered  Outcome:  Progressing  Goal: Urinary catheter remains patent  Description: INTERVENTIONS:  - Assess patency of urinary catheter  - If patient has a chronic dela cruz, consider changing catheter if non-functioning  - Follow guidelines for intermittent irrigation of non-functioning urinary catheter  Outcome: Progressing

## 2024-06-16 NOTE — PLAN OF CARE
Problem: PHYSICAL THERAPY ADULT  Goal: Performs mobility at highest level of function for planned discharge setting.  See evaluation for individualized goals.  Description: Treatment/Interventions: Functional transfer training, LE strengthening/ROM, Therapeutic exercise, Cognitive reorientation, Patient/family training, Equipment eval/education, Bed mobility, Compensatory technique education, Continued evaluation, Spoke to nursing, OT          See flowsheet documentation for full assessment, interventions and recommendations.  Note: Prognosis: Fair  Problem List: Decreased strength, Impaired balance, Decreased mobility, Decreased cognition, Impaired judgement, Decreased safety awareness, Impaired tone, Pain  Assessment: Mathew Rico is a 73 y.o. male admitted to [facility] on 6/14/2024 for Sepsis due to urinary tract infection  (HCC). PT was consulted and pt was seen on 6/16/2024 for mobility assessment and d/c planning. Pt presents w high fall risk, contact isolation, multiple lines, pain. Poor historian; unclear LOF pta. Pt reports doing stand pivot transfers however unsure if that was recently; on prev admission pt require sit<>stand lift to transfers OOB. Pt is currently functioning at a mod Ax2 for bed mobility, max Ax2 for transfers. Unable to perform stand piv transfers at this time dt weakness, unsteadiness. Able to complete sit<>stand transfers w use of quick move. Did note fatigue w multiple transfer reps. Pt will benefit from continued skilled IP PT to address the above mentioned impairments  in order to maximize recovery and increase functional independence when completing mobility and ADLs. The patient's AM-PAC Basic Mobility Inpatient Short Form Raw Score is 7. A Raw score of less than or equal to 16 suggests the patient may benefit from discharge to post-acute rehabilitation services. Unclear LOF however assume pt functioning near baseline. Defer to caregiver abilities to determine STR v HHPT upon  dc.        Rehab Resource Intensity Level, PT: II (Moderate Resource Intensity) (STR>LTC vs HHPT pending caregiver abilities)    See flowsheet documentation for full assessment.

## 2024-06-16 NOTE — ASSESSMENT & PLAN NOTE
"73 year old male with a history of MS, DM2, hypertension, hyperlipidemia, and CHF presented with shortness of breath, generalized weakness, and nausea.   CT chest/a/p demonstrating \"Decompressed urinary bladder with a suprapubic catheter in place. There is severe bladder wall thickening with perivesical fatty haziness suspicious for cystitis.\"  Cultures growing Staph Aureus.  Urology recommendations appreciated. S/p SPT replacement  History of allergies to Cephalexin, was on cefepime on presentation. Trial of Ceftriaxone this evening.   PT/OT recommending level 2 placement  "

## 2024-06-16 NOTE — ASSESSMENT & PLAN NOTE
Wt Readings from Last 3 Encounters:   06/16/24 69.4 kg (153 lb)   03/31/24 69.3 kg (152 lb 12.5 oz)   03/29/24 63 kg (138 lb 14.2 oz)     Euvolemic. Echo 10/18 showing EF of 75% and grade 1 DD  Daily weights, I/Os

## 2024-06-17 PROBLEM — H53.8 BLURRED VISION, BILATERAL: Status: ACTIVE | Noted: 2024-06-17

## 2024-06-17 LAB
ANION GAP SERPL CALCULATED.3IONS-SCNC: 3 MMOL/L (ref 4–13)
BASOPHILS # BLD AUTO: 0.12 THOUSANDS/ÂΜL (ref 0–0.1)
BASOPHILS NFR BLD AUTO: 1 % (ref 0–1)
BUN SERPL-MCNC: 16 MG/DL (ref 5–25)
CALCIUM SERPL-MCNC: 9.7 MG/DL (ref 8.4–10.2)
CHLORIDE SERPL-SCNC: 107 MMOL/L (ref 96–108)
CO2 SERPL-SCNC: 28 MMOL/L (ref 21–32)
CREAT SERPL-MCNC: 0.91 MG/DL (ref 0.6–1.3)
EOSINOPHIL # BLD AUTO: 0.66 THOUSAND/ÂΜL (ref 0–0.61)
EOSINOPHIL NFR BLD AUTO: 8 % (ref 0–6)
ERYTHROCYTE [DISTWIDTH] IN BLOOD BY AUTOMATED COUNT: 13.6 % (ref 11.6–15.1)
GFR SERPL CREATININE-BSD FRML MDRD: 82 ML/MIN/1.73SQ M
GLUCOSE SERPL-MCNC: 196 MG/DL (ref 65–140)
GLUCOSE SERPL-MCNC: 210 MG/DL (ref 65–140)
GLUCOSE SERPL-MCNC: 241 MG/DL (ref 65–140)
GLUCOSE SERPL-MCNC: 253 MG/DL (ref 65–140)
GLUCOSE SERPL-MCNC: 314 MG/DL (ref 65–140)
HCT VFR BLD AUTO: 41 % (ref 36.5–49.3)
HGB BLD-MCNC: 13.1 G/DL (ref 12–17)
IMM GRANULOCYTES # BLD AUTO: 0.03 THOUSAND/UL (ref 0–0.2)
IMM GRANULOCYTES NFR BLD AUTO: 0 % (ref 0–2)
LYMPHOCYTES # BLD AUTO: 3.13 THOUSANDS/ÂΜL (ref 0.6–4.47)
LYMPHOCYTES NFR BLD AUTO: 37 % (ref 14–44)
MAGNESIUM SERPL-MCNC: 2.2 MG/DL (ref 1.9–2.7)
MCH RBC QN AUTO: 29 PG (ref 26.8–34.3)
MCHC RBC AUTO-ENTMCNC: 32 G/DL (ref 31.4–37.4)
MCV RBC AUTO: 91 FL (ref 82–98)
MONOCYTES # BLD AUTO: 0.7 THOUSAND/ÂΜL (ref 0.17–1.22)
MONOCYTES NFR BLD AUTO: 8 % (ref 4–12)
NEUTROPHILS # BLD AUTO: 3.85 THOUSANDS/ÂΜL (ref 1.85–7.62)
NEUTS SEG NFR BLD AUTO: 46 % (ref 43–75)
NRBC BLD AUTO-RTO: 0 /100 WBCS
PLATELET # BLD AUTO: 314 THOUSANDS/UL (ref 149–390)
PMV BLD AUTO: 8.7 FL (ref 8.9–12.7)
POTASSIUM SERPL-SCNC: 4.4 MMOL/L (ref 3.5–5.3)
RBC # BLD AUTO: 4.52 MILLION/UL (ref 3.88–5.62)
SODIUM SERPL-SCNC: 138 MMOL/L (ref 135–147)
WBC # BLD AUTO: 8.49 THOUSAND/UL (ref 4.31–10.16)

## 2024-06-17 PROCEDURE — 82948 REAGENT STRIP/BLOOD GLUCOSE: CPT

## 2024-06-17 PROCEDURE — 99223 1ST HOSP IP/OBS HIGH 75: CPT | Performed by: STUDENT IN AN ORGANIZED HEALTH CARE EDUCATION/TRAINING PROGRAM

## 2024-06-17 PROCEDURE — 83735 ASSAY OF MAGNESIUM: CPT | Performed by: STUDENT IN AN ORGANIZED HEALTH CARE EDUCATION/TRAINING PROGRAM

## 2024-06-17 PROCEDURE — 80048 BASIC METABOLIC PNL TOTAL CA: CPT | Performed by: STUDENT IN AN ORGANIZED HEALTH CARE EDUCATION/TRAINING PROGRAM

## 2024-06-17 PROCEDURE — 85025 COMPLETE CBC W/AUTO DIFF WBC: CPT | Performed by: STUDENT IN AN ORGANIZED HEALTH CARE EDUCATION/TRAINING PROGRAM

## 2024-06-17 PROCEDURE — 99232 SBSQ HOSP IP/OBS MODERATE 35: CPT | Performed by: INTERNAL MEDICINE

## 2024-06-17 RX ORDER — POLYETHYLENE GLYCOL 3350 17 G/17G
17 POWDER, FOR SOLUTION ORAL 2 TIMES DAILY
Status: DISCONTINUED | OUTPATIENT
Start: 2024-06-17 | End: 2024-06-18 | Stop reason: HOSPADM

## 2024-06-17 RX ORDER — GUAIFENESIN/DEXTROMETHORPHAN 100-10MG/5
10 SYRUP ORAL 3 TIMES DAILY
Status: DISCONTINUED | OUTPATIENT
Start: 2024-06-17 | End: 2024-06-18 | Stop reason: HOSPADM

## 2024-06-17 RX ORDER — BISACODYL 5 MG/1
10 TABLET, DELAYED RELEASE ORAL ONCE
Status: COMPLETED | OUTPATIENT
Start: 2024-06-17 | End: 2024-06-17

## 2024-06-17 RX ADMIN — MELATONIN 3 MG: 3 TAB ORAL at 21:49

## 2024-06-17 RX ADMIN — ENOXAPARIN SODIUM 40 MG: 40 INJECTION SUBCUTANEOUS at 08:17

## 2024-06-17 RX ADMIN — BISACODYL 10 MG: 5 TABLET, COATED ORAL at 10:49

## 2024-06-17 RX ADMIN — OXYBUTYNIN CHLORIDE 5 MG: 5 TABLET ORAL at 08:18

## 2024-06-17 RX ADMIN — ACETAMINOPHEN 975 MG: 325 TABLET, FILM COATED ORAL at 13:45

## 2024-06-17 RX ADMIN — METHENAMINE HIPPURATE 1 G: 1000 TABLET ORAL at 08:32

## 2024-06-17 RX ADMIN — ATORVASTATIN CALCIUM 80 MG: 80 TABLET, FILM COATED ORAL at 17:24

## 2024-06-17 RX ADMIN — BUDESONIDE AND FORMOTEROL FUMARATE DIHYDRATE 2 PUFF: 160; 4.5 AEROSOL RESPIRATORY (INHALATION) at 17:26

## 2024-06-17 RX ADMIN — GABAPENTIN 300 MG: 300 CAPSULE ORAL at 13:45

## 2024-06-17 RX ADMIN — GABAPENTIN 300 MG: 300 CAPSULE ORAL at 08:18

## 2024-06-17 RX ADMIN — METHENAMINE HIPPURATE 1 G: 1000 TABLET ORAL at 17:25

## 2024-06-17 RX ADMIN — MICONAZOLE NITRATE: 20 CREAM TOPICAL at 17:35

## 2024-06-17 RX ADMIN — BUDESONIDE AND FORMOTEROL FUMARATE DIHYDRATE 2 PUFF: 160; 4.5 AEROSOL RESPIRATORY (INHALATION) at 08:33

## 2024-06-17 RX ADMIN — CLOPIDOGREL BISULFATE 75 MG: 75 TABLET ORAL at 08:19

## 2024-06-17 RX ADMIN — ACETAMINOPHEN 975 MG: 325 TABLET, FILM COATED ORAL at 06:16

## 2024-06-17 RX ADMIN — INSULIN LISPRO 2 UNITS: 100 INJECTION, SOLUTION INTRAVENOUS; SUBCUTANEOUS at 17:25

## 2024-06-17 RX ADMIN — GUAIFENESIN 600 MG: 600 TABLET, EXTENDED RELEASE ORAL at 08:19

## 2024-06-17 RX ADMIN — OXYBUTYNIN CHLORIDE 5 MG: 5 TABLET ORAL at 21:48

## 2024-06-17 RX ADMIN — ACETAMINOPHEN 975 MG: 325 TABLET, FILM COATED ORAL at 21:48

## 2024-06-17 RX ADMIN — BACLOFEN 5 MG: 10 TABLET ORAL at 08:18

## 2024-06-17 RX ADMIN — PANTOPRAZOLE SODIUM 40 MG: 40 TABLET, DELAYED RELEASE ORAL at 06:16

## 2024-06-17 RX ADMIN — INSULIN LISPRO 2 UNITS: 100 INJECTION, SOLUTION INTRAVENOUS; SUBCUTANEOUS at 11:55

## 2024-06-17 RX ADMIN — BACLOFEN 5 MG: 10 TABLET ORAL at 21:49

## 2024-06-17 RX ADMIN — PROPRANOLOL HYDROCHLORIDE 60 MG: 60 CAPSULE, EXTENDED RELEASE ORAL at 08:32

## 2024-06-17 RX ADMIN — INSULIN GLARGINE 10 UNITS: 100 INJECTION, SOLUTION SUBCUTANEOUS at 21:49

## 2024-06-17 RX ADMIN — POLYETHYLENE GLYCOL 3350 17 G: 17 POWDER, FOR SOLUTION ORAL at 08:17

## 2024-06-17 RX ADMIN — SENNOSIDES AND DOCUSATE SODIUM 2 TABLET: 50; 8.6 TABLET ORAL at 21:48

## 2024-06-17 RX ADMIN — BACLOFEN 5 MG: 10 TABLET ORAL at 17:24

## 2024-06-17 RX ADMIN — MIRTAZAPINE 7.5 MG: 7.5 TABLET, FILM COATED ORAL at 21:49

## 2024-06-17 RX ADMIN — GUAIFENESIN AND DEXTROMETHORPHAN 10 ML: 100; 10 SYRUP ORAL at 10:50

## 2024-06-17 RX ADMIN — GUAIFENESIN AND DEXTROMETHORPHAN 10 ML: 100; 10 SYRUP ORAL at 17:25

## 2024-06-17 RX ADMIN — OXYBUTYNIN CHLORIDE 5 MG: 5 TABLET ORAL at 17:24

## 2024-06-17 RX ADMIN — MICONAZOLE NITRATE: 20 CREAM TOPICAL at 08:35

## 2024-06-17 RX ADMIN — GABAPENTIN 600 MG: 300 CAPSULE ORAL at 21:48

## 2024-06-17 RX ADMIN — GUAIFENESIN AND DEXTROMETHORPHAN 10 ML: 100; 10 SYRUP ORAL at 21:49

## 2024-06-17 RX ADMIN — INSULIN LISPRO 1 UNITS: 100 INJECTION, SOLUTION INTRAVENOUS; SUBCUTANEOUS at 08:16

## 2024-06-17 RX ADMIN — INSULIN LISPRO 3 UNITS: 100 INJECTION, SOLUTION INTRAVENOUS; SUBCUTANEOUS at 21:49

## 2024-06-17 RX ADMIN — AMLODIPINE BESYLATE 10 MG: 10 TABLET ORAL at 08:18

## 2024-06-17 RX ADMIN — POLYETHYLENE GLYCOL 3350 17 G: 17 POWDER, FOR SOLUTION ORAL at 17:26

## 2024-06-17 NOTE — PROGRESS NOTES
"[facility]  Progress Note  Name: Mathew Rico I  MRN: [id number]  Unit/Bed#: E2 -01 I Date of Admission: 6/14/2024   Date of Service: 6/17/2024 I Hospital Day: 3    Assessment & Plan   * Sepsis due to urinary tract infection  (HCC)  Assessment & Plan  73 year old male with a history of MS, DM2, hypertension, hyperlipidemia, and CHF presented with shortness of breath, generalized weakness, and nausea.   Suspect UTI vs stercoral colitis vs viral infection  CT chest/a/p demonstrating \"Decompressed urinary bladder with a suprapubic catheter in place. 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  On review of previous urine cultures he has grown polymicrobial organisms.  Mild leukocytosis on admission which appears chronic.  No documented fevers   Urology recommendations appreciated. S/p SPT replacement  Patient has histories of allergies to cephalexin but tolerated cefepime and ceftriaxone during hospital stay.  He has received 3 days of IV beta-lactam.  Will monitor off further antibiotics  Optimize bowel regimen  PT/OT recommending level 2 resource need.  Patient at home with Senior life and family support  Consider discharge in next 24 hours pending neurology evaluation    Symptoms of upper respiratory infection (URI)  Assessment & Plan  Patient reporting congestion, cough, shortness of breath, nausea, and sore throat for several days  CT chest \"Bibasilar dependent subsegmental atelectasis. Linear scarring in the lingula. No tracheal or endobronchial lesion.\"  RP2 panel negative, no evidence of bacterial pneumonia at this time.  Schedule Robitussin          Constipation  Assessment & Plan  CT a/p demonstrating \"Marked rectal fecal impaction with wall thickening and perirectal inflammation consistent with stercoral " "proctitis. Large stool burden throughout the colon consistent with constipation.\"  Continue bowerl regimen, increase MiraLAX to twice daily, give one-time dose Dulcolax today    Chronic diastolic congestive heart failure (HCC)  Assessment & Plan  Wt Readings from Last 3 Encounters:   06/16/24 69.4 kg (153 lb)   03/31/24 69.3 kg (152 lb 12.5 oz)   03/29/24 63 kg (138 lb 14.2 oz)     Euvolemic. Echo 10/18 showing EF of 75% and grade 1 DD        Type 2 diabetes mellitus with neuropathy ([facility])  Assessment & Plan  Hyperglycemia while inpatient  Continue basal bolus protocol plus sliding scale    Obstructive sleep apnea  Assessment & Plan  Continue cpap h.s.     Multiple sclerosis ([facility])  Assessment & Plan  S/p suprapubic catheter secondary to Neurogenic bladder due to Multiple sclerosis  Patient noting new onset blurry vision.  He has had issues with his vision in the past related to MS but this seems worse than usual.  He reports this started approximately 3 days ago  Possibly recrudescence in setting of acute illness?  Will have neurology evaluate           VTE Pharmacologic Prophylaxis: lovenox     Patient Centered Rounds:  Patient care rounds were performed with nursing    Education and Discussions with Family / Patient: Patient     Time Spent for Care: I have spent a total time of 43 minutes on 06/17/24 in caring for this patient including Diagnostic results, Risks and benefits of tx options, Instructions for management, Impressions, Counseling / Coordination of care, Documenting in the medical record, and Communicating with other healthcare professionals .      Current Length of Stay: 3 day(s)    Current Patient Status: Inpatient   Certification Statement: The patient will continue to require additional inpatient hospital stay due to need for neurologic work up, management of sepsis    Discharge Plan: 24 hours     Code Status: Level 1 - Full Code      Subjective:   Patient seen and evaluated at bedside. He is having " a cough and blurry vision.     Objective:     Vitals:   Temp (24hrs), Av.1 °F (36.2 °C), Min:96.1 °F (35.6 °C), Max:98 °F (36.7 °C)    Temp:  [96.1 °F (35.6 °C)-98 °F (36.7 °C)] 96.1 °F (35.6 °C)  HR:  [76-79] 79  Resp:  [17-18] 18  BP: (114-147)/(54-66) 147/66  SpO2:  [95 %-98 %] 98 %  Body mass index is 23.96 kg/m².     Input and Output Summary (last 24 hours):       Intake/Output Summary (Last 24 hours) at 2024 0950  Last data filed at 2024 0701  Gross per 24 hour   Intake --   Output 1400 ml   Net -1400 ml       Physical Exam:     Physical Exam  Vitals reviewed.   Constitutional:       General: He is not in acute distress.     Appearance: He is well-developed. He is not ill-appearing, toxic-appearing or diaphoretic.   HENT:      Head: Normocephalic and atraumatic.      Mouth/Throat:      Mouth: Mucous membranes are moist.   Eyes:      General: No scleral icterus.     Extraocular Movements: Extraocular movements intact.   Cardiovascular:      Rate and Rhythm: Normal rate and regular rhythm.      Heart sounds: Normal heart sounds.   Pulmonary:      Effort: Pulmonary effort is normal. No respiratory distress.      Breath sounds: Normal breath sounds. No wheezing or rales.   Abdominal:      General: There is no distension.      Palpations: Abdomen is soft.      Tenderness: There is no abdominal tenderness. There is no guarding or rebound.   Musculoskeletal:         General: No swelling, tenderness or deformity.   Skin:     General: Skin is warm and dry.   Neurological:      General: No focal deficit present.      Mental Status: He is alert. Mental status is at baseline.   Psychiatric:         Mood and Affect: Mood normal.         Behavior: Behavior normal.         Thought Content: Thought content normal.         Judgment: Judgment normal.         Additional Data:     Labs: I have reviewed pertinent results     Results from last 7 days   Lab Units 24  0524   WBC Thousand/uL 8.49   HEMOGLOBIN g/dL  13.1   HEMATOCRIT % 41.0   PLATELETS Thousands/uL 314   SEGS PCT % 46   LYMPHO PCT % 37   MONO PCT % 8   EOS PCT % 8*     Results from last 7 days   Lab Units 06/17/24  0524 06/15/24  0856 06/14/24  1357   SODIUM mmol/L 138   < > 135   POTASSIUM mmol/L 4.4   < > 4.4   CHLORIDE mmol/L 107   < > 103   CO2 mmol/L 28   < > 25   BUN mg/dL 16   < > 17   CREATININE mg/dL 0.91   < > 0.91   ANION GAP mmol/L 3*   < > 7   CALCIUM mg/dL 9.7   < > 9.9   ALBUMIN g/dL  --   --  3.7   TOTAL BILIRUBIN mg/dL  --   --  0.24   ALK PHOS U/L  --   --  95   ALT U/L  --   --  9   AST U/L  --   --  9*   GLUCOSE RANDOM mg/dL 210*   < > 329*    < > = values in this interval not displayed.         Results from last 7 days   Lab Units 06/17/24 0623 06/16/24 2109 06/16/24  1856 06/16/24  1618 06/16/24  1617 06/16/24  1056 06/16/24  0614 06/15/24  2106 06/15/24  1536 06/15/24  1152 06/15/24  0624 06/15/24  0100   POC GLUCOSE mg/dl 196* 338* 318* 309* 320* 330* 195* 274* 235* 282* 167* 250*     Results from last 7 days   Lab Units 06/16/24  0457   HEMOGLOBIN A1C % 8.0*     Results from last 7 days   Lab Units 06/16/24  0457 06/14/24  1357   LACTIC ACID mmol/L  --  1.2   PROCALCITONIN ng/ml <0.05 <0.05         Imaging: I have reviewed pertinent imaging       Recent Cultures (last 7 days):     Results from last 7 days   Lab Units 06/14/24 2057 06/14/24  1858 06/14/24  1357   BLOOD CULTURE   --  No Growth at 48 hrs. No Growth at 48 hrs.   URINE CULTURE  >100,000 cfu/ml Staphylococcus aureus*  --   --        Last 24 Hours Medication List:   Current Facility-Administered Medications   Medication Dose Route Frequency Provider Last Rate    acetaminophen  975 mg Oral Q8H [facility] Beltran Santizo PA-C      albuterol  2.5 mg Nebulization Q6H PRN Beltran Santizo PA-C      amLODIPine  10 mg Oral Daily Beltran Santizo PA-C      And    atorvastatin  80 mg Oral Daily With Dinner Beltran Santizo PA-C      baclofen  5 mg Oral TID Beltran Santizo PA-C       benzonatate  100 mg Oral TID PRN Beltran Santizo, SHARI      bisacodyl  10 mg Oral Once Steve Shook DO      bisacodyl  10 mg Rectal Daily PRN Beltran Santizo, SHARI      budesonide-formoterol  2 puff Inhalation BID Beltran Santizo, SHARI      clopidogrel  75 mg Oral Daily Beltran Santizo, SHARI      dextromethorphan-guaiFENesin  10 mL Oral Q4H PRN Beltran Santizo, SHARI      dextromethorphan-guaiFENesin  10 mL Oral TID Steve Shook DO      enoxaparin  40 mg Subcutaneous Daily Beltran Santizo, SHARI      gabapentin  300 mg Oral 2 times per day Beltran Santizo, SHARI      gabapentin  600 mg Oral HS Beltran Santizo, SHARI      insulin glargine  10 Units Subcutaneous HS Sanjeev Concepcion MD      insulin lispro  1-5 Units Subcutaneous TID AC Beltran Santizo, SHARI      insulin lispro  1-5 Units Subcutaneous HS Beltran Santizo, SHARI      melatonin  3 mg Oral HS PRN Beltran Santizo, SHARI      methenamine hippurate  1 g Oral BID With Meals Beltran Santizo, SHARI      mirtazapine  7.5 mg Oral HS Beltran Santizo, SHARI      AMPARO ANTIFUNGAL   Topical BID Beltran Santizo, SHARI      oxybutynin  5 mg Oral TID Octavia Storey PA-C      pantoprazole  40 mg Oral Daily Before Breakfast Beltran Santizo, SHARI      polyethylene glycol  17 g Oral BID Steve Shook DO      propranolol  60 mg Oral Daily Beltran Santizo, SHARI      senna-docusate sodium  2 tablet Oral HS Beltran Santizo, SHARI      sodium chloride  1 spray Each Nare Q1H PRN Harper Pollack PA-C          Today, Patient Was Seen By: Steve Shook DO    ** Please Note: Dictation voice to text software may have been used in the creation of this document. **

## 2024-06-17 NOTE — PLAN OF CARE
Problem: Prexisting or High Potential for Compromised Skin Integrity  Goal: Skin integrity is maintained or improved  Description: INTERVENTIONS:  - Identify patients at risk for skin breakdown  - Assess and monitor skin integrity  - Assess and monitor nutrition and hydration status  - Monitor labs   - Assess for incontinence   - Turn and reposition patient  - Assist with mobility/ambulation  - Relieve pressure over bony prominences  - Avoid friction and shearing  - Provide appropriate hygiene as needed including keeping skin clean and dry  - Evaluate need for skin moisturizer/barrier cream  - Collaborate with interdisciplinary team   - Patient/family teaching  - Consider wound care consult   Outcome: Progressing     Problem: SAFETY ADULT  Goal: Patient will remain free of falls  Description: INTERVENTIONS:  - Educate patient/family on patient safety including physical limitations  - Instruct patient to call for assistance with activity   - Consult OT/PT to assist with strengthening/mobility   - Keep Call bell within reach  - Keep bed low and locked with side rails adjusted as appropriate  - Keep care items and personal belongings within reach  - Initiate and maintain comfort rounds  - Make Fall Risk Sign visible to staff  - Offer Toileting every 2 Hours, in advance of need  - Initiate/Maintain bed alarm  - Obtain necessary fall risk management equipment: yellow socks  - Apply yellow socks and bracelet for high fall risk patients  - Consider moving patient to room near nurses station  Outcome: Progressing  Goal: Maintain or return to baseline ADL function  Description: INTERVENTIONS:  -  Assess patient's ability to carry out ADLs; assess patient's baseline for ADL function and identify physical deficits which impact ability to perform ADLs (bathing, care of mouth/teeth, toileting, grooming, dressing, etc.)  - Assess/evaluate cause of self-care deficits   - Assess range of motion  - Assess patient's mobility;  develop plan if impaired  - Assess patient's need for assistive devices and provide as appropriate  - Encourage maximum independence but intervene and supervise when necessary  - Involve family in performance of ADLs  - Assess for home care needs following discharge   - Consider OT consult to assist with ADL evaluation and planning for discharge  - Provide patient education as appropriate  Outcome: Progressing  Goal: Maintains/Returns to pre admission functional level  Description: INTERVENTIONS:  - Perform AM-PAC 6 Click Basic Mobility/ Daily Activity assessment daily.  - Set and communicate daily mobility goal to care team and patient/family/caregiver.   - Collaborate with rehabilitation services on mobility goals if consulted  - Perform Range of Motion 3 times a day.  - Reposition patient every 2 hours.  - Dangle patient 3 times a day  - Stand patient 3 times a day  - Ambulate patient 3 times a day  - Out of bed to chair 3 times a day   - Out of bed for meals 3 times a day  - Out of bed for toileting  - Record patient progress and toleration of activity level   Outcome: Progressing     Problem: GENITOURINARY - ADULT  Goal: Maintains or returns to baseline urinary function  Description: INTERVENTIONS:  - Assess urinary function  - Encourage oral fluids to ensure adequate hydration if ordered  - Administer IV fluids as ordered to ensure adequate hydration  - Administer ordered medications as needed  - Offer frequent toileting  - Follow urinary retention protocol if ordered  Outcome: Progressing  Goal: Absence of urinary retention  Description: INTERVENTIONS:  - Assess patient’s ability to void and empty bladder  - Monitor I/O  - Bladder scan as needed  - Discuss with physician/AP medications to alleviate retention as needed  - Discuss catheterization for long term situations as appropriate  Outcome: Progressing  Goal: Urinary catheter remains patent  Description: INTERVENTIONS:  - Assess patency of urinary  catheter  - If patient has a chronic dela cruz, consider changing catheter if non-functioning  - Follow guidelines for intermittent irrigation of non-functioning urinary catheter  Outcome: Progressing

## 2024-06-17 NOTE — CASE MANAGEMENT
Case Management Discharge Planning Note    Patient name Mathew Rico  Location [facility] /E2 -* MRN [id number]  : 1950 Date 2024       Current Admission Date: 2024  Current Admission Diagnosis:Sepsis due to urinary tract infection  (HCC)   Patient Active Problem List    Diagnosis Date Noted Date Diagnosed    Blurred vision, bilateral 2024     Symptoms of upper respiratory infection (URI) 06/15/2024     Chest pain 2024     Acute encephalopathy 2024     Leukocytosis 2024     GERSON on CPAP 2024     Fall 2024     Primary hypertension 2024     Type 2 diabetes mellitus without complication, without long-term current use of insulin (HCC) 2024     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HCC) 2024     Multiple sclerosis (HCC) 2024     Urinary retention 2024     Neck pain 2024     Vitamin B deficiency 2024     Generalized weakness 2024     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     Fall 2023     Weakness 2023     Accidental fall from chair 2023     Sepsis due to urinary tract infection  (HCC) 2023     Constipation 2023     Chronic diastolic congestive heart failure (HCC) 2023     Hyponatremia 2023     Excessive daytime sleepiness 2022     Shortness of breath 2022     Pancreatic mass 2020     Pancreatic lesion 2020     Bladder stones 2019     Bladder neck contracture 2019     History of CVA (cerebrovascular accident) 10/21/2018     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HCC) 2017     Diabetic neuropathy (HCC) 2016     Neurogenic bladder 2016     Thyroid nodule 2015     Cervical spinal stenosis 2014     Generalized anxiety disorder 2014     Obstructive sleep apnea 2013     Benign colon polyp 2012     Esophageal reflux 2012     Fatty liver 2012     Glaucoma 2012     Hyperlipidemia 2012     Multiple  sclerosis (HCC) 06/19/2012     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) 06/19/2012     Primary hypertension 06/11/2012       LOS (days): 3  Geometric Mean LOS (GMLOS) (days): 3.6  Days to GMLOS:0.9     OBJECTIVE:  Risk of Unplanned Readmission Score: 52.59         Current admission status: Inpatient   Preferred Pharmacy:   [facility]/pharmacy #[id number] - [place] - [address]  [address] [address]  Phone: [phone] Fax: [phone]    [place], [place] - [address]  [address] [address]  Phone: [phone] Fax: [phone]    [facility] [place] - [address],  [address],  First [address] [facility] [address]  Phone: [phone] Fax: [phone]     [facility] - [address] [place] [id number]  Phone: [phone] Fax: [phone]    Primary Care Provider: Carolina Kumari MD    Primary Insurance: [facility]  Secondary Insurance:     DISCHARGE DETAILS:                                                                                        IMM Given (Date):: 06/17/24  IMM Given to:: Patient

## 2024-06-17 NOTE — ASSESSMENT & PLAN NOTE
73-year-old male with longstanding MS not on DMT, left hemisphere lacunar infarct in 2018, basilar aneurysm status post coiling/stenting, hypertension, DM2, neurogenic bladder status post suprapubic catheter, GERSON on CPAP, essential tremor, chronic visual impairment, presents with worse than baseline bilaterally blurred vision.    Exam appears stable as compared to previous neurologic examinations, except for patient reporting subjective worsening of his visual acuity bilaterally.  Unclear when he was last evaluated by an ophthalmologist.  Do not suspect current complaint is due to a new demyelinating plaque.    Plan:  -Recommend outpatient follow-up with ophthalmology.  -Treatment of infectious and metabolic derangements as per primary service.  -Continue Plavix and atorvastatin for secondary stroke prevention.

## 2024-06-17 NOTE — ASSESSMENT & PLAN NOTE
"73 year old male with a history of MS, DM2, hypertension, hyperlipidemia, and CHF presented with shortness of breath, generalized weakness, and nausea.   Suspect UTI vs stercoral colitis vs viral infection  CT chest/a/p demonstrating \"Decompressed urinary bladder with a suprapubic catheter in place. 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  On review of previous urine cultures he has grown polymicrobial organisms.  Mild leukocytosis on admission which appears chronic.  No documented fevers   Urology recommendations appreciated. S/p SPT replacement  Patient has histories of allergies to cephalexin but tolerated cefepime and ceftriaxone during hospital stay.  He has received 3 days of IV beta-lactam.  Will monitor off further antibiotics  Optimize bowel regimen  PT/OT recommending level 2 resource need.  Patient at home with [employer] life and family support  Consider discharge in next 24 hours pending neurology evaluation  "

## 2024-06-17 NOTE — ASSESSMENT & PLAN NOTE
"CT a/p demonstrating \"Marked rectal fecal impaction with wall thickening and perirectal inflammation consistent with stercoral proctitis. Large stool burden throughout the colon consistent with constipation.\"  Continue bowerl regimen, increase MiraLAX to twice daily, give one-time dose Dulcolax today  "

## 2024-06-17 NOTE — PLAN OF CARE
Problem: Prexisting or High Potential for Compromised Skin Integrity  Goal: Skin integrity is maintained or improved  Description: INTERVENTIONS:  - Identify patients at risk for skin breakdown  - Assess and monitor skin integrity  - Assess and monitor nutrition and hydration status  - Monitor labs   - Assess for incontinence   - Turn and reposition patient  - Assist with mobility/ambulation  - Relieve pressure over bony prominences  - Avoid friction and shearing  - Provide appropriate hygiene as needed including keeping skin clean and dry  - Evaluate need for skin moisturizer/barrier cream  - Collaborate with interdisciplinary team   - Patient/family teaching  - Consider wound care consult   Outcome: Progressing     Problem: SAFETY ADULT  Goal: Patient will remain free of falls  Description: INTERVENTIONS:  - Educate patient/family on patient safety including physical limitations  - Instruct patient to call for assistance with activity   - Consult OT/PT to assist with strengthening/mobility   - Keep Call bell within reach  - Keep bed low and locked with side rails adjusted as appropriate  - Keep care items and personal belongings within reach  - Initiate and maintain comfort rounds  - Make Fall Risk Sign visible to staff  - Offer Toileting every 2 Hours, in advance of need  - Initiate/Maintain bed alarm  - Obtain necessary fall risk management equipment:   - Apply yellow socks and bracelet for high fall risk patients  - Consider moving patient to room near nurses station  Outcome: Progressing  Goal: Maintain or return to baseline ADL function  Description: INTERVENTIONS:  -  Assess patient's ability to carry out ADLs; assess patient's baseline for ADL function and identify physical deficits which impact ability to perform ADLs (bathing, care of mouth/teeth, toileting, grooming, dressing, etc.)  - Assess/evaluate cause of self-care deficits   - Assess range of motion  - Assess patient's mobility; develop plan if  impaired  - Assess patient's need for assistive devices and provide as appropriate  - Encourage maximum independence but intervene and supervise when necessary  - Involve family in performance of ADLs  - Assess for home care needs following discharge   - Consider OT consult to assist with ADL evaluation and planning for discharge  - Provide patient education as appropriate  Outcome: Progressing  Goal: Maintains/Returns to pre admission functional level  Description: INTERVENTIONS:  - Perform AM-PAC 6 Click Basic Mobility/ Daily Activity assessment daily.  - Set and communicate daily mobility goal to care team and patient/family/caregiver.   - Collaborate with rehabilitation services on mobility goals if consulted  - Perform Range of Motion 3 times a day.  - Reposition patient every 2 hours.  - Dangle patient 2 times a day  - Stand patient 3 times a day  - Ambulate patient 3 times a day  - Out of bed to chair 3 times a day   - Out of bed for meals 2 times a day  - Out of bed for toileting  - Record patient progress and toleration of activity level   Outcome: Progressing     Problem: GENITOURINARY - ADULT  Goal: Maintains or returns to baseline urinary function  Description: INTERVENTIONS:  - Assess urinary function  - Encourage oral fluids to ensure adequate hydration if ordered  - Administer IV fluids as ordered to ensure adequate hydration  - Administer ordered medications as needed  - Offer frequent toileting  - Follow urinary retention protocol if ordered  Outcome: Progressing  Goal: Absence of urinary retention  Description: INTERVENTIONS:  - Assess patient’s ability to void and empty bladder  - Monitor I/O  - Bladder scan as needed  - Discuss with physician/AP medications to alleviate retention as needed  - Discuss catheterization for long term situations as appropriate  Outcome: Progressing  Goal: Urinary catheter remains patent  Description: INTERVENTIONS:  - Assess patency of urinary catheter  - If patient  has a chronic dela cruz, consider changing catheter if non-functioning  - Follow guidelines for intermittent irrigation of non-functioning urinary catheter  Outcome: Progressing

## 2024-06-17 NOTE — ASSESSMENT & PLAN NOTE
Wt Readings from Last 3 Encounters:   06/16/24 69.4 kg (153 lb)   03/31/24 69.3 kg (152 lb 12.5 oz)   03/29/24 63 kg (138 lb 14.2 oz)     Euvolemic. Echo 10/18 showing EF of 75% and grade 1 DD

## 2024-06-17 NOTE — CASE MANAGEMENT
Case Management Discharge Planning Note    Patient name Mathew Rico  [facility] /E2 -* MRN [id number]  : 1950 Date 2024       Current Admission Date: 2024  Current Admission Diagnosis:Sepsis due to urinary tract infection  (HCC)   Patient Active Problem List    Diagnosis Date Noted Date Diagnosed    Symptoms of upper respiratory infection (URI) 06/15/2024     Chest pain 2024     Acute encephalopathy 2024     Leukocytosis 2024     GERSON on CPAP 2024     Fall 2024     Primary hypertension 2024     Type 2 diabetes mellitus without complication, without long-term current use of insulin (HCC) 2024     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HCC) 2024     Multiple sclerosis (HCC) 2024     Urinary retention 2024     Neck pain 2024     Vitamin B deficiency 2024     Generalized weakness 2024     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     Fall 2023     Weakness 2023     Accidental fall from chair 2023     Sepsis due to urinary tract infection  (HCC) 2023     Constipation 2023     Chronic diastolic congestive heart failure (HCC) 2023     Hyponatremia 2023     Excessive daytime sleepiness 2022     Shortness of breath 2022     Pancreatic mass 2020     Pancreatic lesion 2020     Bladder stones 2019     Bladder neck contracture 2019     History of CVA (cerebrovascular accident) 10/21/2018     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HCC) 2017     Diabetic neuropathy (HCC) 2016     Neurogenic bladder 2016     Thyroid nodule 2015     Cervical spinal stenosis 2014     Generalized anxiety disorder 2014     Obstructive sleep apnea 2013     Benign colon polyp 2012     Esophageal reflux 2012     Fatty liver 2012     Glaucoma 2012     Hyperlipidemia 2012     Multiple sclerosis (HCC) 2012     Type 2  diabetes mellitus with neuropathy (HCC) 06/19/2012     Primary hypertension 06/11/2012       LOS (days): 3  Geometric Mean LOS (GMLOS) (days): 3.6  Days to GMLOS:0.9     OBJECTIVE:  Risk of Unplanned Readmission Score: 52.59         Current admission status: Inpatient   Preferred Pharmacy:   [facility]/pharmacy #[id number] - [place] - [address]  [address] [address]  Phone: [phone] Fax: [phone]    [place], [place] - [address]  [address] [address]  Phone: [phone] Fax: [phone]    [facility] [place] [address]  [facility] [address],  [address],  First [address] [facility] [address]  Phone: [phone] Fax: [phone]     [facility] [place] [facility] [address] [place] [address]  Phone: [phone] Fax: [phone]    Primary Care Provider: Carolina Kumari MD    Primary Insurance: [facility]  Secondary Insurance:     DISCHARGE DETAILS:                           Contacts  Patient Contacts: [employer]  Relationship to Patient:: Treatment Provider  Contact Method: Phone  Phone Number: [phone]  Reason/Outcome: Discharge Planning (VM left for [facility]/ [facility] informing them of potential discharge tomorrow and need for increased services in the home (PT/OT/SN) due to pt and family not being interested in short term rehab.)

## 2024-06-17 NOTE — ASSESSMENT & PLAN NOTE
"Patient reporting congestion, cough, shortness of breath, nausea, and sore throat for several days  CT chest \"Bibasilar dependent subsegmental atelectasis. Linear scarring in the lingula. No tracheal or endobronchial lesion.\"  RP2 panel negative, no evidence of bacterial pneumonia at this time.  Schedule Robitussin        "

## 2024-06-17 NOTE — ASSESSMENT & PLAN NOTE
S/p suprapubic catheter secondary to Neurogenic bladder due to Multiple sclerosis  Patient noting new onset blurry vision.  He has had issues with his vision in the past related to MS but this seems worse than usual.  He reports this started approximately 3 days ago  Possibly recrudescence in setting of acute illness?  Will have neurology evaluate

## 2024-06-17 NOTE — UTILIZATION REVIEW
Initial Clinical Review    Admission: Date/Time/Statement:   Admission Orders (From admission, onward)       Ordered        06/14/24 2203  INPATIENT ADMISSION  Once                          Orders Placed This Encounter   Procedures    INPATIENT ADMISSION     Standing Status:   Standing     Number of Occurrences:   1     Order Specific Question:   Level of Care     Answer:   Med Surg [16]     Order Specific Question:   Estimated length of stay     Answer:   More than 2 Midnights     Order Specific Question:   Certification     Answer:   I certify that inpatient services are medically necessary for this patient for a duration of greater than two midnights. See H&P and MD Progress Notes for additional information about the patient's course of treatment.     ED Arrival Information       Expected   -    Arrival   6/14/2024 18:24    Acuity   Urgent              Means of arrival   Stretcher    Escorted by   [facility] ([facility])    Service   Hospitalist    Admission type   Emergency              Arrival complaint   weakness             Chief Complaint   Patient presents with    Weakness - Generalized     Patient arrives via ems coming from home c/o weakness and sob that started today, patient denies sick contacts at home        Initial Presentation: 73 y.o. male presents to ED via  EMS  from home with generalized weakness for  several days.  Additionally  has  shortness of breath, cough, congestion, sore throat and nausea.  Unsure  of last  BM.   Denies  any other symptoms.  PMH  is  GERSON,   MS, DM2,  CHF.  U/A  shows evidence of infection.  CT chest/abdomen shows  suspicion for  cystitis.  Met SIRS  criteria with WBC  12.30,  tachycardia.  Admit   Ip with  Sepsis due to  UTI,  URI, constipation  and plan is  urology consult,  SHARON, aggressive bowel regimen, blood/urine  cultures, daily  weight and continue home meds.    Urology consult  Neurogenic bladder, history of BPH, status post TURP, urinary retention with  indwelling chronic suprapubic tube, UTI Plan to exchange  s/p  tube.  Manually irrigate  s/p tube   daily.   Continue  SHARON>    Bedside procedure     Placement   Dela Cruz Catheter Placement  Procedures     Patient placed in the supine position. Area prepped and draped with Betadine in the normal sterile fashion. Lidocaine urojet gel was introduced into the urethral meatus for lubrication. 24F latex dela cruz was inserted to the hub with minimal resistance. straw-yellow urine returned and 8 mL sterile water was placed in the balloon. Dela Cruz drained minimal straw-yellow urine as bladder was empty upon exchange. Patient tolerated the procedure well. Attached to gravity drainage bag.    Anticipated Length of Stay/Certification Statement: Patient will be admitted on an inpatient basis with an anticipated length of stay of greater than 2 midnights secondary to Sepsis due to UTI.     Date:   6/15   Day 2:   Continue  SHARON  and f/u  cultures.       Date:   6/16  Day 3: Has surpassed a 2nd midnight with active treatments and services.  Needs  PT/OT.  Rehab recommended.  Remains on  SHARON, F/U  cultures. S/P  tube intact.      Continue current meds/treatment plan.       ED Triage Vitals   Temperature Pulse Respirations Blood Pressure SpO2 Pain Score   06/14/24 1830 06/14/24 1830 06/14/24 1830 06/14/24 1830 06/14/24 1830 06/15/24 0015   98.7 °F (37.1 °C) 101 19 (!) 195/81 97 % No Pain     Weight (last 2 days)       Date/Time Weight    06/16/24 0600 69.4 (153)    06/15/24 0600 67.7 (149.25)    06/15/24 0036 67.5 (148.81)            Vital Signs (last 3 days)       Date/Time Temp Pulse Resp BP MAP (mmHg) SpO2 O2 Device Patient Position - Orthostatic VS [place] Coma Scale Score Pain    06/17/24 1030 -- -- -- -- -- -- -- -- 15 No Pain    06/17/24 0743 96.1 °F (35.6 °C) 79 18 147/66 91 98 % None (Room air) Lying -- --    06/17/24 0616 -- -- -- -- -- -- -- -- -- 5    06/16/24 2203 -- -- -- -- -- -- -- -- -- 5    06/16/24 2106 97.2 °F (36.2 °C)  76 18 121/60 79 95 % None (Room air) Lying -- --    06/16/24 1930 -- -- -- -- -- -- -- -- -- No Pain    06/16/24 1615 98 °F (36.7 °C) 76 17 114/54 70 95 % None (Room air) Lying -- --    06/16/24 1600 -- -- -- -- -- -- -- -- -- No Pain    06/16/24 1006 -- -- -- -- -- -- -- -- -- 9    06/16/24 1005 -- -- -- -- -- -- -- -- -- 9    06/16/24 0942 -- -- -- -- -- -- -- -- 15 No Pain    06/16/24 0718 98 °F (36.7 °C) 66 16 117/61 74 95 % None (Room air) Lying -- --    06/15/24 2103 98.1 °F (36.7 °C) 71 18 116/57 80 95 % None (Room air) Lying -- --    06/15/24 2025 -- -- -- -- -- -- -- -- 15 5    06/15/24 1534 97.4 °F (36.3 °C) 66 18 105/55 78 93 % None (Room air) Lying -- --    06/15/24 1341 -- -- -- -- -- -- -- -- -- 7    06/15/24 0927 -- 66 -- 113/64 74 -- -- -- -- --    06/15/24 0815 -- -- -- -- -- -- -- -- 15 --    06/15/24 0800 -- -- -- -- -- -- -- -- -- 5    06/15/24 0721 97 °F (36.1 °C) 70 18 126/61 88 94 % None (Room air) Lying -- --    06/15/24 0107 -- -- -- -- -- -- -- -- -- No Pain    06/15/24 0015 -- -- -- -- -- -- -- -- 15 No Pain    06/14/24 2338 97.8 °F (36.6 °C) 100 16 161/74 106 97 % None (Room air) Lying -- --    06/14/24 2100 -- 101 15 155/68 98 96 % None (Room air) Lying -- --    06/14/24 1900 -- 102 20 152/67 96 95 % None (Room air) Lying -- --    06/14/24 1830 98.7 °F (37.1 °C) 101 19 195/81 -- 97 % None (Room air) Sitting -- --              Pertinent Labs/Diagnostic Test Results:   Radiology:  CT chest abdomen pelvis w contrast   Final Interpretation by Tosin King MD (06/14 2135)      Marked rectal fecal impaction with wall thickening and perirectal inflammation consistent with stercoral proctitis.      Large stool burden throughout the colon consistent with constipation.      Decompressed urinary bladder with a suprapubic catheter in place. There is severe bladder wall thickening with perivesical fatty haziness suspicious for cystitis.      No acute intrathoracic process.      The study was  marked in EPIC for immediate notification.               Workstation performed: [id number]           Cardiology:  ECG 12 lead   Final Result by Ed Basurto DO (06/15 0947)   Sinus tachycardia   Otherwise normal ECG   When compared with ECG of 14-JUN-2024 18:33, (unconfirmed)   No significant change was found   Confirmed by Ed Basurto ([id number]) on 6/15/2024 9:47:21 AM      ECG 12 lead   Final Result by Ed Basurto DO (06/15 0947)   Sinus tachycardia   When compared with ECG of 15-MAY-2024 08:54,   QT has lengthened   Confirmed by Ed Basurto ([id number]) on 6/15/2024 9:47:19 AM          Results from last 7 days   Lab Units 06/15/24  1559 06/15/24  0104   SARS-COV-2  Not Detected Negative     Results from last 7 days   Lab Units 06/17/24  0524 06/16/24  0457 06/15/24  0634 06/14/24  1357   WBC Thousand/uL 8.49 10.45* 13.01* 12.30*   HEMOGLOBIN g/dL 13.1 13.2 13.8 14.2   HEMATOCRIT % 41.0 41.5 43.8 43.7   PLATELETS Thousands/uL 314 339 323 399*   TOTAL NEUT ABS Thousands/µL 3.85  --  7.08 6.78         Results from last 7 days   Lab Units 06/17/24  0524 06/16/24  0457 06/15/24  0856 06/14/24  1357   SODIUM mmol/L 138 136 136 135   POTASSIUM mmol/L 4.4 4.3 4.1 4.4   CHLORIDE mmol/L 107 106 107 103   CO2 mmol/L 28 25 24 25   ANION GAP mmol/L 3* 5 5 7   BUN mg/dL 16 16 14 17   CREATININE mg/dL 0.91 1.03 0.82 0.91   EGFR ml/min/1.73sq m 82 71 87 82   CALCIUM mg/dL 9.7 9.9 9.3 9.9   MAGNESIUM mg/dL 2.2 2.1 1.9 2.2     Results from last 7 days   Lab Units 06/14/24  1357   AST U/L 9*   ALT U/L 9   ALK PHOS U/L 95   TOTAL PROTEIN g/dL 7.7   ALBUMIN g/dL 3.7   TOTAL BILIRUBIN mg/dL 0.24   BILIRUBIN DIRECT mg/dL 0.01     Results from last 7 days   Lab Units 06/17/24  0623 06/16/24  2109 06/16/24  1856 06/16/24  1618 06/16/24  1617 06/16/24  1056 06/16/24  0614 06/15/24  2106 06/15/24  1536 06/15/24  1152 06/15/24  0624 06/15/24  0100   POC GLUCOSE mg/dl 196* 338* 318* 309* 320* 330* 195* 274* 235* 282* 167* 250*     Results from  last 7 days   Lab Units 06/17/24  0524 06/16/24  0457 06/15/24  0856 06/14/24  1357   GLUCOSE RANDOM mg/dL 210* 223* 164* 329*         Results from last 7 days   Lab Units 06/16/24  0457   HEMOGLOBIN A1C % 8.0*   EAG mg/dl 183           Results from last 7 days   Lab Units 06/14/24 2057 06/14/24  1357   HS TNI 0HR ng/L  --  5   HS TNI 2HR ng/L 6  --    HSTNI D2 ng/L 1  --      Results from last 7 days   Lab Units 06/14/24  1357   D-DIMER QUANTITATIVE ug/ml FEU 0.53*             Results from last 7 days   Lab Units 06/16/24 0457 06/14/24  1357   PROCALCITONIN ng/ml <0.05 <0.05     Results from last 7 days   Lab Units 06/14/24  1357   LACTIC ACID mmol/L 1.2             Results from last 7 days   Lab Units 06/14/24  1357   BNP pg/mL 31                                     Results from last 7 days   Lab Units 06/14/24 2057   CLARITY UA  Turbid   COLOR UA  Colorless   SPEC GRAV UA  1.039*   PH UA  6.5   GLUCOSE UA mg/dl >=1000 (1%)*   KETONES UA mg/dl Negative   BLOOD UA  Small*   PROTEIN UA mg/dl Trace*   NITRITE UA  Positive*   BILIRUBIN UA  Negative   UROBILINOGEN UA (BE) mg/dl <2.0   LEUKOCYTES UA  Large*   WBC UA /hpf Innumerable*   RBC UA /hpf Innumerable*   BACTERIA UA /hpf Occasional   EPITHELIAL CELLS WET PREP /hpf None Seen     Results from last 7 days   Lab Units 06/15/24  1559 06/15/24  0104   INFLUENZA A PCR   --  Negative   INFLUENZA B PCR   --  Negative   INFLUENZA B  Not Detected  --    RSV PCR   --  Negative   RESPIRATORY SYNCYTIAL VIRUS  Not Detected  --      Results from last 7 days   Lab Units 06/15/24  1559   ADENOVIRUS  Not Detected   BORDETELLA PARAPERTUSSIS  Not Detected   BORDETELLA PERTUSSIS  Not Detected   CHLAMYDIA PNEUMONIAE  Not Detected   CORONAVIRUS 229E  Not Detected   CORONAVIRUS HKU1  Not Detected   CORONAVIRUS NL63  Not Detected   CORONAVIRUS OC43  Not Detected   METAPNEUMOVIRUS  Not Detected   RHINOVIRUS  Not Detected   MYCOPLASMA PNEUMONIAE  Not Detected   PARAINFLUENZA 1  Not  Detected   PARAINFLUENZA 2  Not Detected   PARAINFLUENZA 3  Not Detected   PARAINFLUENZA 4  Not Detected           Results from last 7 days   Lab Units 06/14/24 2057 06/14/24  1858 06/14/24  1357   BLOOD CULTURE   --  No Growth at 48 hrs. No Growth at 48 hrs.   URINE CULTURE  >100,000 cfu/ml Staphylococcus aureus*  --   --                    ED Treatment-Medication Administration from 06/14/2024 1824 to 06/14/2024 2323         Date/Time Order Dose Route Action     06/14/2024 1910 ondansetron (ZOFRAN) injection 4 mg 4 mg Intravenous Given     06/14/2024 1908 sodium chloride 0.9 % bolus 500 mL 500 mL Intravenous New Bag     06/14/2024 2059 ipratropium-albuterol (DUO-NEB) 0.5-2.5 mg/3 mL inhalation solution 3 mL 3 mL Nebulization Given     06/14/2024 2017 iohexol (OMNIPAQUE) 350 MG/ML injection (MULTI-DOSE) 100 mL 100 mL Intravenous Given     06/14/2024 2210 ceftriaxone (ROCEPHIN) 1 g/50 mL in dextrose IVPB 1,000 mg Intravenous New Bag              Present on Admission:   Sepsis due to urinary tract infection  (HCC)   Constipation   Obstructive sleep apnea   Multiple sclerosis (HCC)   Chronic diastolic congestive heart failure (HCC)   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)      Admitting Diagnosis: Weakness generalized [R53.1]  Urinary tract infection [N39.0]  Constipation [K59.00]  Age/Sex: 73 y.o. male  Admission Orders:  Scheduled Medications:  acetaminophen, 975 mg, Oral, Q8H CIERA  amLODIPine, 10 mg, Oral, Daily   And  atorvastatin, 80 mg, Oral, Daily With Dinner  baclofen, 5 mg, Oral, TID  bisacodyl, 10 mg, Oral, Once  budesonide-formoterol, 2 puff, Inhalation, BID  clopidogrel, 75 mg, Oral, Daily  dextromethorphan-guaiFENesin, 10 mL, Oral, TID  enoxaparin, 40 mg, Subcutaneous, Daily  gabapentin, 300 mg, Oral, 2 times per day  gabapentin, 600 mg, Oral, HS  insulin glargine, 10 Units, Subcutaneous, HS  insulin lispro, 1-5 Units, Subcutaneous, TID AC  insulin lispro, 1-5 Units, Subcutaneous, HS  methenamine  hippurate, 1 g, Oral, BID With Meals  mirtazapine, 7.5 mg, Oral, HS  AMPARO ANTIFUNGAL, , Topical, BID  oxybutynin, 5 mg, Oral, TID  pantoprazole, 40 mg, Oral, Daily Before Breakfast  polyethylene glycol, 17 g, Oral, BID  propranolol, 60 mg, Oral, Daily  senna-docusate sodium, 2 tablet, Oral, HS      Continuous IV Infusions:     PRN Meds:  albuterol, 2.5 mg, Nebulization, Q6H PRN  benzonatate, 100 mg, Oral, TID PRN  bisacodyl, 10 mg, Rectal, Daily PRN  dextromethorphan-guaiFENesin, 10 mL, Oral, Q4H PRN  melatonin, 3 mg, Oral, HS PRN  sodium chloride, 1 spray, Each Nare, Q1H PRN        IP CONSULT TO UROLOGY  IP CONSULT TO NEUROLOGY    Network Utilization Review Department  ATTENTION: Please call with any questions or concerns to [phone] and carefully listen to the prompts so that you are directed to the right person. All voicemails are confidential.   For Discharge needs, contact Care Management DC Support Team at [phone] opt. [phone]  Send all requests for admission clinical reviews, approved or denied determinations and any other requests to dedicated fax number below belonging to the campus where the patient is receiving treatment. List of dedicated fax numbers for the Facilities:  FACILITY NAME UR FAX NUMBER   ADMISSION DENIALS (Administrative/Medical Necessity) [phone]   DISCHARGE SUPPORT TEAM (NETWORK) [phone]   PARENT CHILD HEALTH (Maternity/NICU/Pediatrics) [phone]   [facility] [phone]   [facility] [phone]   [facility] [phone]   [facility] [phone]   [facility] [phone]   [facility] [phone]   [facility] [phone]   [facility] [phone]   [facility] [phone]   [facility]  [phone]   [facility] [phone]   [facility] [phone]

## 2024-06-17 NOTE — CONSULTS
"Consultation - Neurology   Mathew Rico 73 y.o. male MRN: [id number]  Unit/Bed#: E2 -01 Encounter: [id number]      Assessment & Plan     Blurred vision, bilateral  Assessment & Plan  73-year-old male with longstanding MS not on DMT, left hemisphere lacunar infarct in 2018, basilar aneurysm status post coiling/stenting, hypertension, DM2, neurogenic bladder status post suprapubic catheter, GERSON on CPAP, essential tremor, chronic visual impairment, presents with worse than baseline bilaterally blurred vision.    Exam appears stable as compared to previous neurologic examinations, except for patient reporting subjective worsening of his visual acuity bilaterally.  Unclear when he was last evaluated by an ophthalmologist.  Do not suspect current complaint is due to a new demyelinating plaque.    Plan:  -Recommend outpatient follow-up with ophthalmology.  -Treatment of infectious and metabolic derangements as per primary service.  -Continue Plavix and atorvastatin for secondary stroke prevention.          Mathew Rico will need follow up in 6-8 weeks with multiple sclerosis attending or advance practitioner. He will not require outpatient neurological testing.    History of Present Illness     Reason for Consult / Principal Problem: worsened blurred vision  Hx and PE limited by: ?historian  HPI: Mathew Rico is a 73 y.o. right handed male with longstanding MS not on DMT, left hemisphere lacunar infarct in 2018, basilar aneurysm status post coiling/stenting, hypertension, DM2, neurogenic bladder status post suprapubic catheter, GERSON on CPAP, essential tremor, chronic visual impairment, presents with worse than baseline bilaterally blurred vision.    The patient is currently admitted for treatment of sepsis due to UTI and had his suprapubic catheter changed out this admission by urology.  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    Patient states he has had horizontal diplopia \"all his life\" due to " "his lazy eye and \"always\" has blurred vision bilaterally, and he does have prescription glasses but does not wear them as they do not help.  He sees either an optometrist or ophthalmologist through [facility].  For the past 3 days of (since admission), patient states his blurred vision has worsened, making it extremely difficult for him to see the television.  This has been quite distressing to him.  He denies any eye pain or other new neurologic symptoms.  He is also quite preoccupied with [facility] being told that he may get out of his bed into a wheelchair as he states he is not allowed out of his bed as his ambulatory status is considered \"bedfast\" there.    He was diagnosed with MS in the 1960s after becoming a paraplegic at the age of 15.  He has been following with [facility] neurology and is not on any disease modifying therapy.  After reviewing neurology notes, \"Blurred vision\" has been listed as a problem dating back to at least 2014 and he has been encouraged to follow with ophthalmology, though it is unclear if he has been doing so.    Exam appears similar to prior neurologic exams with right esotropia, right lower extremity greater than left lower extremity weakness with significantly diminished bilateral lower extremity sensation, as well as diffuse hyperreflexia with bilaterally sustained ankle clonus and upgoing toes with triple flexion.  Patient is able to finger count bilaterally but vision in the right eye appears significantly worse than the left eye, with patient reporting he can barely see out of that eye.  Unclear what his baseline visual acuity is.    Inpatient consult to Neurology  Consult performed by: Meme Urbina PA-C  Consult ordered by: Steve Shook DO          Review of Systems   Constitutional: Negative.    HENT: Negative.     Eyes:  Positive for visual disturbance.   Respiratory: Negative.     Cardiovascular: Negative.    Gastrointestinal: Negative.    Endocrine: " Negative.    Genitourinary: Negative.    Musculoskeletal: Negative.    Skin:  Negative for rash.   Allergic/Immunologic: Negative.    Neurological:  Positive for tremors, weakness and numbness.        As above.   Hematological: Negative.    Psychiatric/Behavioral: Negative.         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HCC)     Bladder infection     Bronchitis     Constipation     Cough     Diabetes (HCC)     Diabetes mellitus (HCC)     Dizziness     Dysfunction of eustachian tube     Erectile dysfunction of non-organic origin     Fatigue     Glaucoma     Hiatal hernia     Hypertension     Imbalance     Leg muscle spasm     MS (multiple sclerosis) (HCC)     Multiple sclerosis (HCC)     Nephrolithiasis     Neurogenic bladder     No natural teeth     Sinus pain     Spinal stenosis     Strain of thoracic region     Stroke ([facility])     Suprapubic catheter ([facility])      Past Surgical History:   Procedure Laterality Date    APPENDECTOMY      BRAIN SURGERY      Coil placed in aneurysm    CEREBRAL ANEURYSM REPAIR      CYSTOSCOPY      CYSTOSCOPY      CYSTOSCOPY  06/11/2018    CYSTOSCOPY  01/15/2021    EYE SURGERY      transscleral cyclophotocoagulation noncontact YAG laser    IR SUPRAPUBIC CATHETER CHECK/CHANGE/REINSERTION/UPSIZE  3/28/2024    MYRINGOTOMY      with ventilation tube insertion    NJ LITHOLAPAXY SMPL/SM <2.5 CM N/A 5/7/2019    Procedure: CYSTOSCOPY, holmium laser litholapaxy of bladder stones, EXCHANGE OF SP TUBE;  Surgeon: Javy Jeffries MD;  Location: [facility];  Service: Urology    SUPRAPUBIC CATHETER INSERTION       Social History   Social History     Substance and Sexual Activity   Alcohol Use Not Currently     Social History     Substance and Sexual Activity   Drug Use No     E-Cigarette/Vaping    E-Cigarette Use Never User       E-Cigarette/Vaping Substances    Nicotine No     THC No     CBD No     Flavoring No     Other No     Unknown No      Social History     Tobacco Use   Smoking Status Former    Current packs/day: 0.00    Average packs/day: 0.5 packs/day for 31.0 years (15.5 ttl pk-yrs)    Types: Cigarettes    Start date:     Quit date:     Years since quittin.4   Smokeless Tobacco Never     Family History:   Family History   Problem Relation Age of Onset    Heart attack Mother     Stroke Mother     Heart attack Father     Anuerysm Father         In Stomach     Aneurysm Father        Review of previous medical records was completed.     Meds/Allergies   current meds:   Current Facility-Administered Medications   Medication Dose Route Frequency    acetaminophen (TYLENOL) tablet 975 mg  975 mg Oral Q8H CIERA    albuterol inhalation solution 2.5 mg  2.5 mg Nebulization Q6H PRN    amLODIPine (NORVASC) tablet 10 mg  10 mg Oral Daily    And    atorvastatin (LIPITOR) tablet 80 mg  80 mg Oral Daily With Dinner    baclofen tablet 5 mg  5 mg Oral TID    benzonatate (TESSALON PERLES) capsule 100 mg  100 mg Oral TID PRN    bisacodyl (DULCOLAX) rectal suppository 10 mg  10 mg Rectal Daily PRN    budesonide-formoterol (SYMBICORT) 160-4.5 mcg/act inhaler 2 puff  2 puff Inhalation BID    clopidogrel (PLAVIX) tablet 75 mg  75 mg Oral Daily    dextromethorphan-guaiFENesin (ROBITUSSIN DM) oral syrup 10 mL  10 mL Oral Q4H PRN    dextromethorphan-guaiFENesin (ROBITUSSIN DM) oral syrup 10 mL  10 mL Oral TID    enoxaparin (LOVENOX) subcutaneous injection 40 mg  40 mg Subcutaneous Daily    gabapentin (NEURONTIN) capsule 300 mg  300 mg Oral 2 times per day    gabapentin (NEURONTIN) capsule 600 mg  600 mg Oral HS    insulin glargine (LANTUS) subcutaneous injection 10 Units 0.1 mL  10 Units Subcutaneous HS    insulin lispro (HumALOG/ADMELOG) 100 units/mL subcutaneous injection 1-5 Units  1-5 Units Subcutaneous TID AC    insulin lispro  (HumALOG/ADMELOG) 100 units/mL subcutaneous injection 1-5 Units  1-5 Units Subcutaneous HS    melatonin tablet 3 mg  3 mg Oral HS PRN    methenamine hippurate (HIPREX) tablet 1 g  1 g Oral BID With Meals    mirtazapine (REMERON) tablet 7.5 mg  7.5 mg Oral HS    moisture barrier miconazole 2% cream (aka AMPARO MOISTURE BARRIER ANTIFUNGAL CREAM)   Topical BID    oxybutynin (DITROPAN) tablet 5 mg  5 mg Oral TID    pantoprazole (PROTONIX) EC tablet 40 mg  40 mg Oral Daily Before Breakfast    polyethylene glycol (MIRALAX) packet 17 g  17 g Oral BID    propranolol (INDERAL LA) 24 hr capsule 60 mg  60 mg Oral Daily    senna-docusate sodium (SENOKOT S) 8.6-50 mg per tablet 2 tablet  2 tablet Oral HS    sodium chloride (OCEAN) 0.65 % nasal spray 1 spray  1 spray Each Nare Q1H PRN       Allergies   Allergen Reactions    Cephalexin Rash    Cephalexin Rash       Objective   Vitals:Blood pressure 147/66, pulse 79, temperature (!) 96.1 °F (35.6 °C), temperature source Temporal, resp. rate 18, weight 69.4 kg (153 lb), SpO2 98%.,Body mass index is 23.96 kg/m².    Intake/Output Summary (Last 24 hours) at 6/17/2024 1416  Last data filed at 6/17/2024 0701  Gross per 24 hour   Intake --   Output 1400 ml   Net -1400 ml       Invasive Devices:   Invasive Devices       Peripheral Intravenous Line  Duration             Peripheral IV 06/14/24 Dorsal (posterior);Right Forearm 2 days    Peripheral IV 06/14/24 Left;Ventral (anterior) Forearm 2 days              Drain  Duration             Suprapubic Catheter 24 Fr. 2 days                    Physical Exam  General:  Patient is well-developed, well-nourished, and in no acute distress.  HEENT:  Head normocephalic.  Eyes anicteric.    Cardiovascular:  With regular rhythm.  Lungs:  Normal effort. Nonlabored breathing.  Extremities:  With no significant edema.    Skin: No rashes.    Neurologic Exam  Neurologic:  Patient is alert, pleasantly interactive, and appropriately conversational.  Stuttering  "speech but no dysarthria or aphasia.  Patient oriented to self and ever states he is at [facility], but then later to able to correct to \"hospital\" and requires cueing to come up with the name of the hospital.  Unsure of the season but knows it is warm outside and states the month is September.  Does not know the year. Able to state previous president and then current president.    Gait deferred as patient nonambulatory.    Cranial Nerves:   II: Able to finger count bilaterally but patient appears to have less visual acuity in R eye as compared to L. Pupils equal, round, reactive to light with normal accomodation.  Cannot visualize optic fundi.  III,IV,VI: extraocular movements intact with no nystagmus. R esotropia, chronic.  V: Diminished light touch appreciation left face as compared to right.  VII: Face is symmetric with no weakness noted.   XII: Tongue midline with no atrophy or fasciculations with appropriate movement.     Coordination:  Accurate with finger-to-nose maneuvers bilaterally.    Motor testing with full strength bilateral upper extremities.  1/5 proximal right lower extremity and 0/5 distally.  2-2+/5 proximal left lower extremity and 1/5 distally.    Sensory testing with diminished light touch appreciation left upper extremity as compared to right.  Patient reports no light touch appreciation in either lower extremity.    Reflexes 3 bilateral upper extremities, 3+ bilateral knees, 4+ bilateral ankles with sustained clonus. (-) Heath's.    There responses are upgoing (triple flexion) bilaterally.    Lab Results: CBC:   Results from last 7 days   Lab Units 06/17/24  0524 06/16/24 0457 06/15/24  0634   WBC Thousand/uL 8.49 10.45* 13.01*   RBC Million/uL 4.52 4.60 4.77   HEMOGLOBIN g/dL 13.1 13.2 13.8   HEMATOCRIT % 41.0 41.5 43.8   MCV fL 91 90 92   PLATELETS Thousands/uL 314 339 323   , BMP/CMP:   Results from last 7 days   Lab Units 06/17/24  0524 06/16/24  0457 06/15/24  0856 06/14/24  1357 "   SODIUM mmol/L 138 136 136 135   POTASSIUM mmol/L 4.4 4.3 4.1 4.4   CHLORIDE mmol/L 107 106 107 103   CO2 mmol/L 28 25 24 25   BUN mg/dL 16 16 14 17   CREATININE mg/dL 0.91 1.03 0.82 0.91   CALCIUM mg/dL 9.7 9.9 9.3 9.9   AST U/L  --   --   --  9*   ALT U/L  --   --   --  9   ALK PHOS U/L  --   --   --  95   EGFR ml/min/1.73sq m 82 71 87 82     Imaging Studies: I have personally reviewed pertinent reports.   and I have personally reviewed pertinent films in PACS MRI brain  EKG, Pathology, and Other Studies: I have personally reviewed pertinent reports.    VTE Prophylaxis: Sequential compression device (Venodyne)     Code Status: Level 1 - Full Code  Advance Directive and Living Will:      Power of :    POLST:

## 2024-06-18 VITALS
BODY MASS INDEX: 23.96 KG/M2 | HEART RATE: 60 BPM | TEMPERATURE: 97 F | WEIGHT: 153 LBS | DIASTOLIC BLOOD PRESSURE: 62 MMHG | SYSTOLIC BLOOD PRESSURE: 131 MMHG | OXYGEN SATURATION: 98 % | RESPIRATION RATE: 18 BRPM

## 2024-06-18 LAB
BACTERIA UR CULT: ABNORMAL
BACTERIA UR CULT: ABNORMAL
GLUCOSE SERPL-MCNC: 180 MG/DL (ref 65–140)
GLUCOSE SERPL-MCNC: 233 MG/DL (ref 65–140)

## 2024-06-18 PROCEDURE — 82948 REAGENT STRIP/BLOOD GLUCOSE: CPT

## 2024-06-18 PROCEDURE — 99239 HOSP IP/OBS DSCHRG MGMT >30: CPT | Performed by: INTERNAL MEDICINE

## 2024-06-18 RX ORDER — FLUTICASONE PROPIONATE 50 MCG
1 SPRAY, SUSPENSION (ML) NASAL DAILY
Status: DISCONTINUED | OUTPATIENT
Start: 2024-06-18 | End: 2024-06-18 | Stop reason: HOSPADM

## 2024-06-18 RX ORDER — CROMOLYN SODIUM 5.2 MG
1 AEROSOL, SPRAY WITH PUMP (ML) NASAL 3 TIMES DAILY
Qty: 13 ML | Refills: 0 | Status: SHIPPED | OUTPATIENT
Start: 2024-06-18

## 2024-06-18 RX ORDER — POLYETHYLENE GLYCOL 3350 17 G/17G
17 POWDER, FOR SOLUTION ORAL 2 TIMES DAILY
Qty: 60 EACH | Refills: 0 | Status: SHIPPED | OUTPATIENT
Start: 2024-06-18

## 2024-06-18 RX ADMIN — PANTOPRAZOLE SODIUM 40 MG: 40 TABLET, DELAYED RELEASE ORAL at 05:22

## 2024-06-18 RX ADMIN — CLOPIDOGREL BISULFATE 75 MG: 75 TABLET ORAL at 09:15

## 2024-06-18 RX ADMIN — INSULIN LISPRO 1 UNITS: 100 INJECTION, SOLUTION INTRAVENOUS; SUBCUTANEOUS at 09:09

## 2024-06-18 RX ADMIN — ENOXAPARIN SODIUM 40 MG: 40 INJECTION SUBCUTANEOUS at 09:09

## 2024-06-18 RX ADMIN — METHENAMINE HIPPURATE 1 G: 1000 TABLET ORAL at 09:17

## 2024-06-18 RX ADMIN — PROPRANOLOL HYDROCHLORIDE 60 MG: 60 CAPSULE, EXTENDED RELEASE ORAL at 09:16

## 2024-06-18 RX ADMIN — INSULIN LISPRO 2 UNITS: 100 INJECTION, SOLUTION INTRAVENOUS; SUBCUTANEOUS at 12:35

## 2024-06-18 RX ADMIN — GABAPENTIN 300 MG: 300 CAPSULE ORAL at 12:34

## 2024-06-18 RX ADMIN — POLYETHYLENE GLYCOL 3350 17 G: 17 POWDER, FOR SOLUTION ORAL at 09:14

## 2024-06-18 RX ADMIN — FLUTICASONE PROPIONATE 1 SPRAY: 50 SPRAY, METERED NASAL at 12:38

## 2024-06-18 RX ADMIN — OXYBUTYNIN CHLORIDE 5 MG: 5 TABLET ORAL at 09:16

## 2024-06-18 RX ADMIN — MICONAZOLE NITRATE: 20 CREAM TOPICAL at 09:23

## 2024-06-18 RX ADMIN — AMLODIPINE BESYLATE 10 MG: 10 TABLET ORAL at 09:16

## 2024-06-18 RX ADMIN — BUDESONIDE AND FORMOTEROL FUMARATE DIHYDRATE 2 PUFF: 160; 4.5 AEROSOL RESPIRATORY (INHALATION) at 09:17

## 2024-06-18 RX ADMIN — ACETAMINOPHEN 975 MG: 325 TABLET, FILM COATED ORAL at 05:22

## 2024-06-18 RX ADMIN — BACLOFEN 5 MG: 10 TABLET ORAL at 09:16

## 2024-06-18 RX ADMIN — GABAPENTIN 300 MG: 300 CAPSULE ORAL at 09:15

## 2024-06-18 RX ADMIN — GUAIFENESIN AND DEXTROMETHORPHAN 10 ML: 100; 10 SYRUP ORAL at 09:17

## 2024-06-18 NOTE — DISCHARGE SUMMARY
"[facility]  Discharge- Mathew Rico 1950, 73 y.o. male MRN: [id number]  Unit/Bed#: E2 -01 Encounter: [id number]  Primary Care Provider: Carolina Kumari MD   Date and time admitted to hospital: 6/14/2024  6:24 PM    * Sepsis due to urinary tract infection  (HCC)  Assessment & Plan  73 year old male with a history of MS, DM2, hypertension, hyperlipidemia, and CHF presented with shortness of breath, generalized weakness, and nausea.   Suspect UTI vs stercoral colitis vs viral infection  CT chest/a/p demonstrating \"Decompressed urinary bladder with a suprapubic catheter in place. 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  On review of previous urine cultures he has grown polymicrobial organisms.  Mild leukocytosis on admission which appears chronic. No documented fevers   Urology recommendations appreciated. S/p SPT replacement  Patient has histories of allergies to cephalexin but tolerated cefepime and ceftriaxone during hospital stay.  He has received 3 days of IV beta-lactam.  Patient remained stable off further antibiotics  Optimize bowel regimen-suspect that severe constipation on admission contributed to risk for UTI.  Increase MiraLAX to twice a day on discharge   PT/OT recommending level 2 resource need.  Patient DC home with [facility] and family support    Blurred vision, bilateral  Assessment & Plan  Evaluated by neurology do not think it is related to MS.  Recommendation for outpatient follow-up with ophthalmology    Symptoms of upper respiratory infection (URI)  Assessment & Plan  Patient reporting congestion, cough, and sore throat for several days  CT chest \"Bibasilar dependent subsegmental atelectasis. Linear scarring in the lingula. No tracheal or endobronchial lesion.\"  RP2 panel negative, no " "evidence of bacterial pneumonia at this time.  Patient reports nasal congestion and postnasal drip  Recommend daily nasal spray as this may be be related to allergic rhinitis          UTI (urinary tract infection)  Assessment & Plan  Completed adequate course of IV antibiotics    Constipation  Assessment & Plan  CT a/p demonstrating \"Marked rectal fecal impaction with wall thickening and perirectal inflammation consistent with stercoral proctitis. Large stool burden throughout the colon consistent with constipation.\"  Continue bowerl regimen, increase MiraLAX to twice daily on discharge with goal bowel movement every day    Chronic diastolic congestive heart failure (HCC)  Assessment & Plan  Wt Readings from Last 3 Encounters:   06/16/24 69.4 kg (153 lb)   03/31/24 69.3 kg (152 lb 12.5 oz)   03/29/24 63 kg (138 lb 14.2 oz)     Euvolemic. Echo 10/18 showing EF of 75% and grade 1 DD        Type 2 diabetes mellitus with neuropathy ([facility])  Assessment & Plan  Resume home medications    Multiple sclerosis ([facility])  Assessment & Plan  S/p suprapubic catheter secondary to Neurogenic bladder due to Multiple sclerosis  Outpatient follow-up with neurology        Discharging Physician / Practitioner: Steev Shook DO  PCP: Carolina Kumari MD  Admission Date:   Admission Orders (From admission, onward)       Ordered        06/14/24 2203  INPATIENT ADMISSION  Once                          Discharge Date: 06/18/24    Medical Problems       Resolved Problems  Date Reviewed: 6/16/2024   None         Consultations During Hospital Stay:   IP CONSULT TO UROLOGY  IP CONSULT TO NEUROLOGY    Procedures Performed: None    Significant Findings / Test Results:     CT chest abdomen pelvis w contrast    Result Date: 6/14/2024  Impression: Marked rectal fecal impaction with wall thickening and perirectal inflammation consistent with stercoral proctitis. Large stool burden throughout the colon consistent with constipation. Decompressed " urinary bladder with a suprapubic catheter in place. There is severe bladder wall thickening with perivesical fatty haziness suspicious for cystitis. No acute intrathoracic process. The study was marked in EPIC for immediate notification. Workstation performed: [id number]       Test Results Pending at Discharge (will require follow up): None     Outpatient Tests Requested: None    Reason for Admission: Generalized weakness    Hospital Course:     Mathew Rico is a 73 y.o. male With past medical history of multiple sclerosis as well as neurogenic bladder with chronic suprapubic catheter, noncemented type 2 diabetes, hypertension, hyperlipidemia presented to hospital with generalized weakness.  Patient met sepsis criteria on admission and was started empirically on antibiotics with concern for urinary tract infection.  Additionally found to have large stool burden with fecal impaction.  Patient was started on bowel regimen with successful evacuation.  He clinically improved on IV antibiotics.  Discharged home in to care of his family and [facility].    Please see above list of diagnoses and related plan for additional information.     Condition at Discharge: stable     Discharge Day Visit / Exam:     Subjective:  He feels well.     Vitals: Blood Pressure: 131/62 (06/18/24 0747)  Pulse: 60 (06/18/24 0747)  Temperature: (!) 97 °F (36.1 °C) (06/18/24 0747)  Temp Source: Temporal (06/18/24 0747)  Respirations: 18 (06/18/24 0747)  Weight - Scale: 69.4 kg (153 lb) (06/16/24 0600)  SpO2: 98 % (06/18/24 0747)  Exam:   Physical Exam  Vitals reviewed.   Constitutional:       General: He is not in acute distress.     Appearance: He is well-developed. He is not ill-appearing, toxic-appearing or diaphoretic.   HENT:      Head: Normocephalic and atraumatic.      Mouth/Throat:      Mouth: Mucous membranes are moist.   Eyes:      General: No scleral icterus.     Extraocular Movements: Extraocular movements intact.    Cardiovascular:      Rate and Rhythm: Normal rate and regular rhythm.      Heart sounds: Normal heart sounds.   Pulmonary:      Effort: Pulmonary effort is normal. No respiratory distress.      Breath sounds: Normal breath sounds. No wheezing or rales.   Abdominal:      General: There is no distension.      Palpations: Abdomen is soft.      Tenderness: There is no abdominal tenderness. There is no guarding or rebound.   Musculoskeletal:         General: No swelling, tenderness or deformity.   Skin:     General: Skin is warm and dry.   Neurological:      General: No focal deficit present.      Mental Status: He is alert. Mental status is at baseline.   Psychiatric:         Mood and Affect: Mood normal.         Behavior: Behavior normal.         Thought Content: Thought content normal.         Judgment: Judgment normal.           Discharge instructions/Information to patient and family:   See after visit summary for information provided to patient and family.      Provisions for Follow-Up Care:  See after visit summary for information related to follow-up care and any pertinent home health orders.      Disposition:     Home     Discharge Statement:  I spent 60 minutes discharging the patient. This time was spent on the day of discharge. I had direct contact with the patient on the day of discharge. Greater than 50% of the total time was spent examining patient, answering all patient questions, arranging and discussing plan of care with patient as well as directly providing post-discharge instructions.  Additional time then spent on discharge activities.    Discharge Medications:  See after visit summary for reconciled discharge medications provided to patient and family.      ** Please Note: This note has been constructed using a voice recognition system **

## 2024-06-18 NOTE — PLAN OF CARE
Problem: Prexisting or High Potential for Compromised Skin Integrity  Goal: Skin integrity is maintained or improved  Description: INTERVENTIONS:  - Identify patients at risk for skin breakdown  - Assess and monitor skin integrity  - Assess and monitor nutrition and hydration status  - Monitor labs   - Assess for incontinence   - Turn and reposition patient  - Assist with mobility/ambulation  - Relieve pressure over bony prominences  - Avoid friction and shearing  - Provide appropriate hygiene as needed including keeping skin clean and dry  - Evaluate need for skin moisturizer/barrier cream  - Collaborate with interdisciplinary team   - Patient/family teaching  - Consider wound care consult   Outcome: Progressing     Problem: SAFETY ADULT  Goal: Patient will remain free of falls  Description: INTERVENTIONS:  - Educate patient/family on patient safety including physical limitations  - Instruct patient to call for assistance with activity   - Consult OT/PT to assist with strengthening/mobility   - Keep Call bell within reach  - Keep bed low and locked with side rails adjusted as appropriate  - Keep care items and personal belongings within reach  - Initiate and maintain comfort rounds  - Make Fall Risk Sign visible to staff  - Offer Toileting every 2 Hours, in advance of need  - Initiate/Maintain bed alarm  Apply yellow socks and bracelet for high fall risk patients  - Consider moving patient to room near nurses station  Outcome: Progressing     Problem: GENITOURINARY - ADULT  Goal: Urinary catheter remains patent  Description: INTERVENTIONS:  - Assess patency of urinary catheter  - If patient has a chronic dela cruz, consider changing catheter if non-functioning  - Follow guidelines for intermittent irrigation of non-functioning urinary catheter  Outcome: Progressing

## 2024-06-18 NOTE — ASSESSMENT & PLAN NOTE
"CT a/p demonstrating \"Marked rectal fecal impaction with wall thickening and perirectal inflammation consistent with stercoral proctitis. Large stool burden throughout the colon consistent with constipation.\"  Continue bowerl regimen, increase MiraLAX to twice daily on discharge with goal bowel movement every day  "

## 2024-06-18 NOTE — RESTORATIVE TECHNICIAN NOTE
Restorative Technician Note      Patient Name: Mathew Rico     Restorative Tech Visit Date: 06/18/24  Note Type: Mobility  Patient Position Upon Consult: Supine  Activity Performed: Range of motion; Repositioned  Patient Position at End of Consult: Supine; All needs within reach

## 2024-06-18 NOTE — ASSESSMENT & PLAN NOTE
S/p suprapubic catheter secondary to Neurogenic bladder due to Multiple sclerosis  Outpatient follow-up with neurology

## 2024-06-18 NOTE — DISCHARGE INSTR - AVS FIRST PAGE
Dear Mathew Rico,     It was our pleasure to care for you here at [facility].  It is our hope that we were always able to exceed the expected standards for your care during your stay.  You were hospitalized due to UTI and severe constipation.  You were cared for on the 2nd floor under the service of Steve Shook DO with the [facility] who covers for your primary care physician (PCP), Carolina Kumari MD, while you were hospitalized.  If you have any questions or concerns related to this hospitalization, you may contact us at .  For follow up as well as medication refills, we recommend that you follow up with your primary care physician.  A registered nurse will reach out to you by phone within a few days after your discharge to answer any additional questions that you may have after going home.  However, at this time we provide for you here, the most important instructions / recommendations at discharge:     Notable Medication Adjustments -   Increase miralax to twice a day for goal bowel movement every 1 day.  Start nasal spray to help with allergic rhinitis leading to post nasal drip and cough   Testing Required after Discharge -   No further testing at this time   ** Please contact your PCP to request testing orders for any of the testing recommended here **  Important Follow Up Information -   Please see your primary doctor in 1-2 weeks for hospital follow up   Please review this entire after visit summary as additional general instructions including medication list, appointments, activity, diet, any pertinent wound care, and other additional recommendations from your care team that may be provided for you.      Sincerely,     Steve Shook DO

## 2024-06-18 NOTE — ASSESSMENT & PLAN NOTE
-- DO NOT REPLY / DO NOT REPLY ALL --  -- Message is from the [facility]--      Patient is requesting a medication refill - medication is on active list    Was Medication Pended? Yes.    Rx Name and Dose:  norethindrone (MICRONOR) 0.35 MG tablet    Duration: 30 days    Pharmacy  [id number] - [place], [place] - [address]    Patient confirmed the above pharmacy as correct?  Yes    Does this request need an existing or new prescription at a pharmacy to be sent to a new pharmacy location?   No    Caller Information       Type Contact Phone    01/15/2022 08:05 AM CST Phone (Incoming) Nisa Saavedra (Self) [phone] (M)          Alternative phone number: n/a  PATIENT is on her last mediaction for today.    Turnaround time given to caller:   \"This message will be sent to [state Provider's name]. The clinical team will fulfill your request as soon as they review your message when the office opens on Monday (or after the holiday).\"   Evaluated by neurology do not think it is related to MS.  Recommendation for outpatient follow-up with ophthalmology

## 2024-06-18 NOTE — ASSESSMENT & PLAN NOTE
"73 year old male with a history of MS, DM2, hypertension, hyperlipidemia, and CHF presented with shortness of breath, generalized weakness, and nausea.   Suspect UTI vs stercoral colitis vs viral infection  CT chest/a/p demonstrating \"Decompressed urinary bladder with a suprapubic catheter in place. 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  On review of previous urine cultures he has grown polymicrobial organisms.  Mild leukocytosis on admission which appears chronic.  No documented fevers   Urology recommendations appreciated. S/p SPT replacement  Patient has histories of allergies to cephalexin but tolerated cefepime and ceftriaxone during hospital stay.  He has received 3 days of IV beta-lactam.  Patient remained stable off further antibiotics  Optimize bowel regimen-suspect that severe constipation on admission contributed to risk for UTI.  Increase MiraLAX to twice a day discharge   PT/OT recommending level 2 resource need.  Patient at home with [facility] and family support  "

## 2024-06-18 NOTE — CASE MANAGEMENT
Case Management Discharge Planning Note    Patient name Mathew Rico  Location [facility] 2 /E2 -* MRN [id number]  : 1950 Date 2024       Current Admission Date: 2024  Current Admission Diagnosis:Sepsis due to urinary tract infection  (HCC)   Patient Active Problem List    Diagnosis Date Noted Date Diagnosed    Blurred vision, bilateral 2024     Symptoms of upper respiratory infection (URI) 06/15/2024     Chest pain 2024     Acute encephalopathy 2024     Leukocytosis 2024     GERSON on CPAP 2024     Fall 2024     Primary hypertension 2024     Type 2 diabetes mellitus without complication, without long-term current use of insulin (HCC) 2024     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HCC) 2024     Multiple sclerosis (HCC) 2024     Urinary retention 2024     Neck pain 2024     Vitamin B deficiency 2024     Generalized weakness 2024     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     Fall 2023     Weakness 2023     Accidental fall from chair 2023     Sepsis due to urinary tract infection  (HCC) 2023     Constipation 2023     Chronic diastolic congestive heart failure (HCC) 2023     Hyponatremia 2023     Excessive daytime sleepiness 2022     Shortness of breath 2022     Pancreatic mass 2020     Pancreatic lesion 2020     Bladder stones 2019     Bladder neck contracture 2019     History of CVA (cerebrovascular accident) 10/21/2018     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HCC) 2017     Diabetic neuropathy (HCC) 2016     Neurogenic bladder 2016     Thyroid nodule 2015     Cervical spinal stenosis 2014     Generalized anxiety disorder 2014     Obstructive sleep apnea 2013     Benign colon polyp 2012     Esophageal reflux 2012     Fatty liver 2012     Glaucoma 2012     Hyperlipidemia 2012     Multiple  sclerosis (HCC) 06/19/2012     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) 06/19/2012     Primary hypertension 06/11/2012       LOS (days): 4  Geometric Mean LOS (GMLOS) (days): 3.6  Days to GMLOS:0     OBJECTIVE:  Risk of Unplanned Readmission Score: 53.37         Current admission status: Inpatient   Preferred Pharmacy:   [id number] [place] - [address]  [address] [address]  Phone: [phone] Fax: [phone]    [place], [place] - [address]  [address] [address]  Phone: [phone] Fax: [phone]    [facility] [place], [place] - [address],  [address]  [address] [facility] [address]  Phone: [phone] Fax: [phone]     [facility] [place], [place] - [address] [address]  Phone: [phone] Fax: [phone]    Primary Care Provider: Carolina Kumari MD    Primary Insurance: [facility]  Secondary Insurance:     DISCHARGE DETAILS:    Discharge planning discussed with:: Guzman Rico (Brother) [phone]  Freedom of Choice: Yes  Comments - Freedom of Choice: Brother prefers Pt goes home w/ Home PT/OT. Brother is refusing STR.                Contacts  Patient Contacts: [facility]  Relationship to Patient:: Treatment Provider  Contact Method: Phone  Phone Number: [phone]  Reason/Outcome: Discharge Planning, Continuity of Care    Requested Home Health Care         Is the patient interested in HHC at discharge?: Yes  Home Health Discipline requested:: Nursing, Occupational Therapy, Physical Therapy  Home Health Agency Name:: Other ([facility])  HHA External Referral Reason (only applicable if external HHA name selected): Patient has established relationship with provider         Other Referral/Resources/Interventions Provided:  Interventions: Transportation    Would you like to participate in our [facility] service  program?  : No - Declined    Treatment Team Recommendation: Short Term Rehab  Discharge Destination Plan:: Home with Home Health Care  Transport at Discharge : Stretcher sandra  Dispatcher Contacted: Yes  Number/Name of Dispatcher: SLETS  Transported by (Company and Unit #): [employer] [phone]  ETA of Transport (Date): 06/18/24  ETA of Transport (Time): 1400     Transfer Mode: Stretcher                 Additional Comments: BETO reached out to [employer] and left a message informing of pt's d/c. [employer] returned call and confirmed services starting today. D/C summary was faxed to [phone] at [place] request.  CM reached out to brother Guzman and informed him of d/c.

## 2024-06-18 NOTE — ASSESSMENT & PLAN NOTE
"Patient reporting congestion, cough, and sore throat for several days  CT chest \"Bibasilar dependent subsegmental atelectasis. Linear scarring in the lingula. No tracheal or endobronchial lesion.\"  RP2 panel negative, no evidence of bacterial pneumonia at this time.  Patient reports nasal congestion and postnasal drip  Recommend daily nasal spray as this may be be related to allergic rhinitis        "

## 2024-06-19 NOTE — UTILIZATION REVIEW
NOTIFICATION OF ADMISSION DISCHARGE   This is a Notification of Discharge from [facility]. Please be advised that this patient has been discharge from our facility. Below you will find the admission and discharge date and time including the patient’s disposition.   UTILIZATION REVIEW CONTACT:  Marsha Garcia  Utilization   Network Utilization Review Department  Phone: [phone] x carefully listen to the prompts. All voicemails are confidential.  Email: [email or web address]     ADMISSION INFORMATION  PRESENTATION DATE: 6/14/2024  6:24 PM  OBERVATION ADMISSION DATE:   INPATIENT ADMISSION DATE: 6/14/24 10:03 PM   DISCHARGE DATE: 6/18/2024  1:32 PM   DISPOSITION:Home/Self Care    Network Utilization Review Department  ATTENTION: Please call with any questions or concerns to [phone] and carefully listen to the prompts so that you are directed to the right person. All voicemails are confidential.   For Discharge needs, contact Care Management DC Support Team at [phone] opt. 2  Send all requests for admission clinical reviews, approved or denied determinations and any other requests to dedicated fax number below belonging to the campus where the patient is receiving treatment. List of dedicated fax numbers for the Facilities:  FACILITY NAME UR FAX NUMBER   ADMISSION DENIALS (Administrative/Medical Necessity) [phone]   DISCHARGE SUPPORT TEAM ([facility]) [phone]   PARENT CHILD HEALTH (Maternity/NICU/Pediatrics) [phone]   [facility] [phone]   [facility] [phone]   [facility] [phone]   [facility] [phone]   [facility] [phone]   [facility] [phone]   [facility] [phone]   [facility] [phone]   [facility]  [facility] [phone]   [facility] [phone]   [facility] [phone]   [facility] [phone]

## 2024-06-20 LAB
BACTERIA BLD CULT: NORMAL
BACTERIA BLD CULT: NORMAL

## 2024-06-25 NOTE — PROGRESS NOTES
[facility]  Progress Note  Name: Joel Aguilar  MRN: [id number]  Unit/Bed#: E4 -01 I Date of Admission: 12/12/2023   Date of Service: 12/13/2023 I Hospital Day: 1    Assessment/Plan   * Fall  Assessment & Plan  Multifactorial due to advanced age, history of stroke, deconditioning  CT showed no traumatic injury. Discussed possible rehab placement which he is against.  Fall precautions    Leucocytosis  Assessment & Plan  Neutrophilic leukocytosis on admission  Reactive due to fall versus infection with possible UTI  He has negative  procalcitonin, low suspicion for pneumonia. Reviewed old records, he has chronic mild leukocytosis    Urinary retention  Assessment & Plan  With chronic suprapubic catheter. Abnormal urinalysis with leukocytosis and possible urinary tract infection. Continue empiric ceftriaxone. Follow-up culture results  Check CBC in a.m. Type 2 diabetes mellitus with hyperglycemia, without long-term current use of insulin (HCC)  Assessment & Plan    Lab Results   Component Value Date    HGBA1C 8.1 (H) 12/04/2023     Continue sliding scale insulin    Primary hypertension  Assessment & Plan  Continue propranolol and Norvasc with hold parameters    History of CVA (cerebrovascular accident)  Assessment & Plan  Continue Plavix and Lipitor         VTE Pharmacologic Prophylaxis: VTE Score: 4 Moderate Risk (Score 3-4) - Pharmacological DVT Prophylaxis Ordered: heparin. Patient Centered Rounds: I performed bedside rounds with nursing staff today. Discussions with Specialists or Other Care Team Provider: BETO  Current Length of Stay: 1 day(s)  Current Patient Status: Inpatient   Certification Statement: The patient will continue to require additional inpatient hospital stay due to fall  Discharge Plan: Anticipate discharge in 24-48 hrs to home with home services.     Code Status: Level 1 - Full Code    Subjective:   Seen and examined  He was trying to get out of bed and fell  He denied losing consciousness  He does not want to go to rehab    Objective:     Vitals:   Temp (24hrs), Av.6 °F (36.4 °C), Min:97.1 °F (36.2 °C), Max:97.8 °F (36.6 °C)    Temp:  [97.1 °F (36.2 °C)-97.8 °F (36.6 °C)] 97.1 °F (36.2 °C)  HR:  [71-96] 88  Resp:  [16-20] 18  BP: (102-123)/(51-62) 102/51  SpO2:  [96 %-97 %] 96 %  Body mass index is 24.38 kg/m². Input and Output Summary (last 24 hours): Intake/Output Summary (Last 24 hours) at 2023 1324  Last data filed at 2023 1100  Gross per 24 hour   Intake 950 ml   Output 500 ml   Net 450 ml       Physical Exam:   Physical Exam  Vitals reviewed. Constitutional:       Appearance: He is not ill-appearing. HENT:      Head: Normocephalic and atraumatic. Nose: No congestion or rhinorrhea. Eyes:      General: No scleral icterus. Cardiovascular:      Rate and Rhythm: Regular rhythm. Pulmonary:      Breath sounds: No wheezing or rhonchi. Abdominal:      Palpations: Abdomen is soft. Tenderness: There is no abdominal tenderness. There is no guarding. Genitourinary:     Comments: S/p spc  Musculoskeletal:      Cervical back: Neck supple. Right lower leg: No edema. Left lower leg: No edema. Skin:     General: Skin is warm and dry.    Psychiatric:         Mood and Affect: Mood normal.         Behavior: Behavior normal.          Additional Data:     Labs:  Results from last 7 days   Lab Units 23  0556   WBC Thousand/uL 15.37*   HEMOGLOBIN g/dL 13.2   HEMATOCRIT % 39.4   PLATELETS Thousands/uL 305   NEUTROS PCT % 66   LYMPHS PCT % 21   MONOS PCT % 8   EOS PCT % 4     Results from last 7 days   Lab Units 23  0556 23  1720   SODIUM mmol/L 132* 132*   POTASSIUM mmol/L 3.6 4.2   CHLORIDE mmol/L 101 98   CO2 mmol/L 23 26   BUN mg/dL 11 10   CREATININE mg/dL 0.90 1.05   ANION GAP mmol/L 8 8   CALCIUM mg/dL 9.7 10.3*   ALBUMIN g/dL  --  3.7   TOTAL BILIRUBIN mg/dL  --  0.81   ALK PHOS U/L  --  68   ALT U/L  --  8   AST U/L  --  10*   GLUCOSE RANDOM mg/dL 94 131     Results from last 7 days   Lab Units 12/12/23  1720   INR  1.01     Results from last 7 days   Lab Units 12/13/23  1110 12/13/23  0743   POC GLUCOSE mg/dl 114 79         Results from last 7 days   Lab Units 12/13/23  0556 12/12/23  2123 12/12/23 1956   LACTIC ACID mmol/L  --   --  2.0   PROCALCITONIN ng/ml 0.14 0.15  --        Lines/Drains:  Invasive Devices       Peripheral Intravenous Line  Duration             Peripheral IV 12/12/23 Left Antecubital <1 day              Drain  Duration             Suprapubic Catheter 20 Fr. 171 days                  Imaging: Reviewed radiology reports from this admission including: CTs    Recent Cultures (last 7 days):   Results from last 7 days   Lab Units 12/12/23 1956 12/12/23 1934   BLOOD CULTURE  Received in Microbiology Lab. Culture in Progress. Received in Microbiology Lab. Culture in Progress.        Last 24 Hours Medication List:   Current Facility-Administered Medications   Medication Dose Route Frequency Provider Last Rate    acetaminophen  975 mg Oral Q8H PRN Jeannie Campos PA-C      ALPRAZolam  0.25 mg Oral BID PRN Harper Pollack PA-C      amLODIPine  10 mg Oral Daily Harper Pollack PA-C      atorvastatin  80 mg Oral Daily With [employer] SHARI Pollack      cefTRIAXone  1,000 mg Intravenous Q24H Harper Pollack PA-C      clopidogrel  75 mg Oral Daily Harper Pollack PA-C      Diclofenac Sodium  2 g Topical Q4H PRN Jeannie Campos PA-C      gabapentin  300 mg Oral BID Harper Pollack PA-C      gabapentin  600 mg Oral HS Harper Pollack PA-C      heparin (porcine)  5,000 Units Subcutaneous Q8H [address] Harper Pollack PA-C      insulin lispro  1-5 Units Subcutaneous 4x Daily (AC & HS) Harper Pollack PA-C      latanoprost  1 drop Both Eyes HS Harper Pollack PA-C      ondansetron  4 mg Intravenous Q6H PRN Harper Pollack PA-C      propranolol  60 mg Oral Daily Harper DODSON SHARI Pollack      sertraline  25 mg Oral HS Harper Napier PA-C          Today, Patient Was Seen By: Kelly Parker MD    **Please Note: This note may have been constructed using a voice recognition system. ** show

## 2024-06-27 ENCOUNTER — HOSPITAL ENCOUNTER (EMERGENCY)
Facility: HOSPITAL | Age: 74
Discharge: HOME | End: 2024-06-27
Attending: EMERGENCY MEDICINE
Payer: MEDICARE

## 2024-06-27 ENCOUNTER — APPOINTMENT (EMERGENCY)
Dept: RADIOLOGY | Facility: HOSPITAL | Age: 74
End: 2024-06-27
Payer: MEDICARE

## 2024-06-27 VITALS
BODY MASS INDEX: 24.17 KG/M2 | SYSTOLIC BLOOD PRESSURE: 141 MMHG | HEART RATE: 84 BPM | TEMPERATURE: 98.2 F | WEIGHT: 154.32 LBS | RESPIRATION RATE: 18 BRPM | DIASTOLIC BLOOD PRESSURE: 67 MMHG | OXYGEN SATURATION: 93 %

## 2024-06-27 DIAGNOSIS — R07.89 CHEST TIGHTNESS: ICD-10-CM

## 2024-06-27 DIAGNOSIS — R07.9 CHEST PAIN: ICD-10-CM

## 2024-06-27 DIAGNOSIS — R09.81 NASAL CONGESTION: ICD-10-CM

## 2024-06-27 DIAGNOSIS — R05.9 COUGH: Primary | ICD-10-CM

## 2024-06-27 LAB
2HR DELTA HS TROPONIN: 1 NG/L
ALBUMIN SERPL BCG-MCNC: 3.6 G/DL (ref 3.5–5)
ALP SERPL-CCNC: 94 U/L (ref 34–104)
ALT SERPL W P-5'-P-CCNC: 16 U/L (ref 7–52)
ANION GAP SERPL CALCULATED.3IONS-SCNC: 8 MMOL/L (ref 4–13)
ANION GAP SERPL CALCULATED.3IONS-SCNC: 8 MMOL/L (ref 4–13)
AST SERPL W P-5'-P-CCNC: 21 U/L (ref 13–39)
ATRIAL RATE: 80 BPM
ATRIAL RATE: 96 BPM
BASOPHILS # BLD AUTO: 0.12 THOUSANDS/ÂΜL (ref 0–0.1)
BASOPHILS NFR BLD AUTO: 1 % (ref 0–1)
BILIRUB SERPL-MCNC: 0.34 MG/DL (ref 0.2–1)
BUN SERPL-MCNC: 16 MG/DL (ref 5–25)
BUN SERPL-MCNC: 16 MG/DL (ref 5–25)
CALCIUM SERPL-MCNC: 9.7 MG/DL (ref 8.4–10.2)
CALCIUM SERPL-MCNC: 9.8 MG/DL (ref 8.4–10.2)
CARDIAC TROPONIN I PNL SERPL HS: 4 NG/L
CARDIAC TROPONIN I PNL SERPL HS: 5 NG/L
CHLORIDE SERPL-SCNC: 102 MMOL/L (ref 96–108)
CHLORIDE SERPL-SCNC: 102 MMOL/L (ref 96–108)
CO2 SERPL-SCNC: 20 MMOL/L (ref 21–32)
CO2 SERPL-SCNC: 21 MMOL/L (ref 21–32)
CREAT SERPL-MCNC: 0.95 MG/DL (ref 0.6–1.3)
CREAT SERPL-MCNC: 0.98 MG/DL (ref 0.6–1.3)
EOSINOPHIL # BLD AUTO: 0.85 THOUSAND/ÂΜL (ref 0–0.61)
EOSINOPHIL NFR BLD AUTO: 6 % (ref 0–6)
ERYTHROCYTE [DISTWIDTH] IN BLOOD BY AUTOMATED COUNT: 13.5 % (ref 11.6–15.1)
FLUAV RNA RESP QL NAA+PROBE: NEGATIVE
FLUBV RNA RESP QL NAA+PROBE: NEGATIVE
GFR SERPL CREATININE-BSD FRML MDRD: 75 ML/MIN/1.73SQ M
GFR SERPL CREATININE-BSD FRML MDRD: 78 ML/MIN/1.73SQ M
GLUCOSE SERPL-MCNC: 213 MG/DL (ref 65–140)
GLUCOSE SERPL-MCNC: 222 MG/DL (ref 65–140)
HCT VFR BLD AUTO: 40.4 % (ref 36.5–49.3)
HGB BLD-MCNC: 13.6 G/DL (ref 12–17)
IMM GRANULOCYTES # BLD AUTO: 0.03 THOUSAND/UL (ref 0–0.2)
IMM GRANULOCYTES NFR BLD AUTO: 0 % (ref 0–2)
LYMPHOCYTES # BLD AUTO: 3.65 THOUSANDS/ÂΜL (ref 0.6–4.47)
LYMPHOCYTES NFR BLD AUTO: 28 % (ref 14–44)
MCH RBC QN AUTO: 28.7 PG (ref 26.8–34.3)
MCHC RBC AUTO-ENTMCNC: 33.7 G/DL (ref 31.4–37.4)
MCV RBC AUTO: 85 FL (ref 82–98)
MONOCYTES # BLD AUTO: 0.99 THOUSAND/ÂΜL (ref 0.17–1.22)
MONOCYTES NFR BLD AUTO: 8 % (ref 4–12)
NEUTROPHILS # BLD AUTO: 7.6 THOUSANDS/ÂΜL (ref 1.85–7.62)
NEUTS SEG NFR BLD AUTO: 57 % (ref 43–75)
NRBC BLD AUTO-RTO: 0 /100 WBCS
P AXIS: 64 DEGREES
P AXIS: 64 DEGREES
PLATELET # BLD AUTO: 347 THOUSANDS/UL (ref 149–390)
PMV BLD AUTO: 8.4 FL (ref 8.9–12.7)
POTASSIUM SERPL-SCNC: 4.5 MMOL/L (ref 3.5–5.3)
POTASSIUM SERPL-SCNC: 5.6 MMOL/L (ref 3.5–5.3)
PR INTERVAL: 170 MS
PR INTERVAL: 172 MS
PROT SERPL-MCNC: 7.6 G/DL (ref 6.4–8.4)
QRS AXIS: 64 DEGREES
QRS AXIS: 74 DEGREES
QRSD INTERVAL: 86 MS
QRSD INTERVAL: 86 MS
QT INTERVAL: 368 MS
QT INTERVAL: 382 MS
QTC INTERVAL: 440 MS
QTC INTERVAL: 464 MS
RBC # BLD AUTO: 4.74 MILLION/UL (ref 3.88–5.62)
RSV RNA RESP QL NAA+PROBE: NEGATIVE
SARS-COV-2 RNA RESP QL NAA+PROBE: NEGATIVE
SODIUM SERPL-SCNC: 130 MMOL/L (ref 135–147)
SODIUM SERPL-SCNC: 131 MMOL/L (ref 135–147)
T WAVE AXIS: 53 DEGREES
T WAVE AXIS: 58 DEGREES
VENTRICULAR RATE: 80 BPM
VENTRICULAR RATE: 96 BPM
WBC # BLD AUTO: 13.24 THOUSAND/UL (ref 4.31–10.16)

## 2024-06-27 PROCEDURE — 80053 COMPREHEN METABOLIC PANEL: CPT

## 2024-06-27 PROCEDURE — 36415 COLL VENOUS BLD VENIPUNCTURE: CPT

## 2024-06-27 PROCEDURE — 84484 ASSAY OF TROPONIN QUANT: CPT

## 2024-06-27 PROCEDURE — 99284 EMERGENCY DEPT VISIT MOD MDM: CPT

## 2024-06-27 PROCEDURE — 0241U HB NFCT DS VIR RESP RNA 4 TRGT: CPT

## 2024-06-27 PROCEDURE — 99285 EMERGENCY DEPT VISIT HI MDM: CPT

## 2024-06-27 PROCEDURE — 80048 BASIC METABOLIC PNL TOTAL CA: CPT

## 2024-06-27 PROCEDURE — 71046 X-RAY EXAM CHEST 2 VIEWS: CPT

## 2024-06-27 PROCEDURE — 93005 ELECTROCARDIOGRAM TRACING: CPT

## 2024-06-27 PROCEDURE — 93010 ELECTROCARDIOGRAM REPORT: CPT

## 2024-06-27 PROCEDURE — 85025 COMPLETE CBC W/AUTO DIFF WBC: CPT

## 2024-06-27 RX ORDER — AMOXICILLIN AND CLAVULANATE POTASSIUM 875; 125 MG/1; MG/1
1 TABLET, FILM COATED ORAL ONCE
Status: COMPLETED | OUTPATIENT
Start: 2024-06-27 | End: 2024-06-27

## 2024-06-27 RX ORDER — AZITHROMYCIN 250 MG/1
TABLET, FILM COATED ORAL
Qty: 4 TABLET | Refills: 0 | Status: SHIPPED | OUTPATIENT
Start: 2024-06-27 | End: 2024-07-01

## 2024-06-27 RX ORDER — AZITHROMYCIN 250 MG/1
500 TABLET, FILM COATED ORAL ONCE
Status: COMPLETED | OUTPATIENT
Start: 2024-06-27 | End: 2024-06-27

## 2024-06-27 RX ORDER — AMOXICILLIN AND CLAVULANATE POTASSIUM 875; 125 MG/1; MG/1
1 TABLET, FILM COATED ORAL EVERY 12 HOURS
Qty: 14 TABLET | Refills: 0 | Status: SHIPPED | OUTPATIENT
Start: 2024-06-27 | End: 2024-07-04

## 2024-06-27 RX ADMIN — AZITHROMYCIN DIHYDRATE 500 MG: 250 TABLET ORAL at 06:40

## 2024-06-27 RX ADMIN — AMOXICILLIN AND CLAVULANATE POTASSIUM 1 TABLET: 875; 125 TABLET, FILM COATED ORAL at 06:41

## 2024-06-27 NOTE — ED NOTES
RN set up transportation with BLS. Waiting for transport time.      Adrian Heath RN  06/27/24 0668

## 2024-06-27 NOTE — DISCHARGE INSTRUCTIONS
Today provided prescription for Augmentin, azithromycin.  Take as directed for treatment of suspected bacterial cause of respiratory infection.  Follow-up with primary care provider as needed.  Return to ED for new or worsening symptoms.

## 2024-07-15 PROBLEM — N39.0 SEPSIS DUE TO URINARY TRACT INFECTION  (HCC): Status: RESOLVED | Noted: 2023-08-18 | Resolved: 2024-07-15

## 2024-07-15 PROBLEM — A41.9 SEPSIS DUE TO URINARY TRACT INFECTION  (HCC): Status: RESOLVED | Noted: 2023-08-18 | Resolved: 2024-07-15

## 2024-07-18 PROBLEM — N39.0 UTI (URINARY TRACT INFECTION): Status: RESOLVED | Noted: 2024-02-15 | Resolved: 2024-07-18

## 2024-07-20 ENCOUNTER — HOSPITAL ENCOUNTER (EMERGENCY)
Facility: HOSPITAL | Age: 74
Discharge: HOME | End: 2024-07-20
Attending: EMERGENCY MEDICINE
Payer: MEDICARE

## 2024-07-20 ENCOUNTER — APPOINTMENT (EMERGENCY)
Dept: CT IMAGING | Facility: HOSPITAL | Age: 74
End: 2024-07-20
Payer: MEDICARE

## 2024-07-20 VITALS
TEMPERATURE: 98.2 F | DIASTOLIC BLOOD PRESSURE: 57 MMHG | WEIGHT: 179.23 LBS | SYSTOLIC BLOOD PRESSURE: 122 MMHG | RESPIRATION RATE: 15 BRPM | HEART RATE: 74 BPM | BODY MASS INDEX: 28.07 KG/M2 | OXYGEN SATURATION: 95 %

## 2024-07-20 DIAGNOSIS — K59.00 CONSTIPATION: ICD-10-CM

## 2024-07-20 DIAGNOSIS — R10.9 ABDOMINAL PAIN: ICD-10-CM

## 2024-07-20 DIAGNOSIS — R42 LIGHTHEADEDNESS: Primary | ICD-10-CM

## 2024-07-20 LAB
2HR DELTA HS TROPONIN: 0 NG/L
ALBUMIN SERPL BCG-MCNC: 3.9 G/DL (ref 3.5–5)
ALP SERPL-CCNC: 100 U/L (ref 34–104)
ALT SERPL W P-5'-P-CCNC: 10 U/L (ref 7–52)
ANION GAP SERPL CALCULATED.3IONS-SCNC: 7 MMOL/L (ref 4–13)
AST SERPL W P-5'-P-CCNC: 11 U/L (ref 13–39)
BACTERIA UR QL AUTO: ABNORMAL /HPF
BASOPHILS # BLD AUTO: 0.14 THOUSANDS/ÂΜL (ref 0–0.1)
BASOPHILS NFR BLD AUTO: 1 % (ref 0–1)
BILIRUB DIRECT SERPL-MCNC: 0.08 MG/DL (ref 0–0.2)
BILIRUB SERPL-MCNC: 0.31 MG/DL (ref 0.2–1)
BILIRUB UR QL STRIP: NEGATIVE
BUDDING YEAST: PRESENT
BUN SERPL-MCNC: 21 MG/DL (ref 5–25)
CALCIUM SERPL-MCNC: 9.9 MG/DL (ref 8.4–10.2)
CARDIAC TROPONIN I PNL SERPL HS: 5 NG/L
CARDIAC TROPONIN I PNL SERPL HS: 5 NG/L
CHLORIDE SERPL-SCNC: 100 MMOL/L (ref 96–108)
CLARITY UR: ABNORMAL
CO2 SERPL-SCNC: 27 MMOL/L (ref 21–32)
COLOR UR: ABNORMAL
CREAT SERPL-MCNC: 0.96 MG/DL (ref 0.6–1.3)
EOSINOPHIL # BLD AUTO: 0.73 THOUSAND/ÂΜL (ref 0–0.61)
EOSINOPHIL NFR BLD AUTO: 6 % (ref 0–6)
ERYTHROCYTE [DISTWIDTH] IN BLOOD BY AUTOMATED COUNT: 13.6 % (ref 11.6–15.1)
GFR SERPL CREATININE-BSD FRML MDRD: 77 ML/MIN/1.73SQ M
GLUCOSE SERPL-MCNC: 287 MG/DL (ref 65–140)
GLUCOSE UR STRIP-MCNC: ABNORMAL MG/DL
HCT VFR BLD AUTO: 45.2 % (ref 36.5–49.3)
HGB BLD-MCNC: 14.8 G/DL (ref 12–17)
HGB UR QL STRIP.AUTO: ABNORMAL
HYPHAE YEAST: PRESENT
IMM GRANULOCYTES # BLD AUTO: 0.03 THOUSAND/UL (ref 0–0.2)
IMM GRANULOCYTES NFR BLD AUTO: 0 % (ref 0–2)
KETONES UR STRIP-MCNC: NEGATIVE MG/DL
LACTATE SERPL-SCNC: 1.8 MMOL/L (ref 0.5–2)
LEUKOCYTE ESTERASE UR QL STRIP: ABNORMAL
LIPASE SERPL-CCNC: 29 U/L (ref 11–82)
LYMPHOCYTES # BLD AUTO: 3.22 THOUSANDS/ÂΜL (ref 0.6–4.47)
LYMPHOCYTES NFR BLD AUTO: 28 % (ref 14–44)
MAGNESIUM SERPL-MCNC: 2 MG/DL (ref 1.9–2.7)
MCH RBC QN AUTO: 27.9 PG (ref 26.8–34.3)
MCHC RBC AUTO-ENTMCNC: 32.7 G/DL (ref 31.4–37.4)
MCV RBC AUTO: 85 FL (ref 82–98)
MONOCYTES # BLD AUTO: 0.93 THOUSAND/ÂΜL (ref 0.17–1.22)
MONOCYTES NFR BLD AUTO: 8 % (ref 4–12)
NEUTROPHILS # BLD AUTO: 6.45 THOUSANDS/ÂΜL (ref 1.85–7.62)
NEUTS SEG NFR BLD AUTO: 57 % (ref 43–75)
NITRITE UR QL STRIP: POSITIVE
NON-SQ EPI CELLS URNS QL MICRO: ABNORMAL /HPF
NRBC BLD AUTO-RTO: 0 /100 WBCS
PH UR STRIP.AUTO: 5.5 [PH]
PLATELET # BLD AUTO: 354 THOUSANDS/UL (ref 149–390)
PMV BLD AUTO: 8.4 FL (ref 8.9–12.7)
POTASSIUM SERPL-SCNC: 4.1 MMOL/L (ref 3.5–5.3)
PROCALCITONIN SERPL-MCNC: <0.05 NG/ML
PROT SERPL-MCNC: 7.6 G/DL (ref 6.4–8.4)
PROT UR STRIP-MCNC: ABNORMAL MG/DL
RBC # BLD AUTO: 5.3 MILLION/UL (ref 3.88–5.62)
RBC #/AREA URNS AUTO: ABNORMAL /HPF
SODIUM SERPL-SCNC: 134 MMOL/L (ref 135–147)
SP GR UR STRIP.AUTO: 1.02 (ref 1–1.03)
UROBILINOGEN UR STRIP-ACNC: <2 MG/DL
WBC # BLD AUTO: 11.5 THOUSAND/UL (ref 4.31–10.16)
WBC #/AREA URNS AUTO: ABNORMAL /HPF

## 2024-07-20 PROCEDURE — 83605 ASSAY OF LACTIC ACID: CPT | Performed by: EMERGENCY MEDICINE

## 2024-07-20 PROCEDURE — 84484 ASSAY OF TROPONIN QUANT: CPT | Performed by: EMERGENCY MEDICINE

## 2024-07-20 PROCEDURE — 80076 HEPATIC FUNCTION PANEL: CPT | Performed by: EMERGENCY MEDICINE

## 2024-07-20 PROCEDURE — 83690 ASSAY OF LIPASE: CPT | Performed by: EMERGENCY MEDICINE

## 2024-07-20 PROCEDURE — 84145 PROCALCITONIN (PCT): CPT | Performed by: EMERGENCY MEDICINE

## 2024-07-20 PROCEDURE — 36415 COLL VENOUS BLD VENIPUNCTURE: CPT | Performed by: EMERGENCY MEDICINE

## 2024-07-20 PROCEDURE — 81001 URINALYSIS AUTO W/SCOPE: CPT | Performed by: EMERGENCY MEDICINE

## 2024-07-20 PROCEDURE — 87086 URINE CULTURE/COLONY COUNT: CPT | Performed by: EMERGENCY MEDICINE

## 2024-07-20 PROCEDURE — 99284 EMERGENCY DEPT VISIT MOD MDM: CPT

## 2024-07-20 PROCEDURE — 85025 COMPLETE CBC W/AUTO DIFF WBC: CPT | Performed by: EMERGENCY MEDICINE

## 2024-07-20 PROCEDURE — 87186 SC STD MICRODIL/AGAR DIL: CPT | Performed by: EMERGENCY MEDICINE

## 2024-07-20 PROCEDURE — 93005 ELECTROCARDIOGRAM TRACING: CPT

## 2024-07-20 PROCEDURE — 74177 CT ABD & PELVIS W/CONTRAST: CPT

## 2024-07-20 PROCEDURE — 96360 HYDRATION IV INFUSION INIT: CPT

## 2024-07-20 PROCEDURE — 83735 ASSAY OF MAGNESIUM: CPT | Performed by: EMERGENCY MEDICINE

## 2024-07-20 PROCEDURE — 99285 EMERGENCY DEPT VISIT HI MDM: CPT | Performed by: EMERGENCY MEDICINE

## 2024-07-20 PROCEDURE — 87147 CULTURE TYPE IMMUNOLOGIC: CPT | Performed by: EMERGENCY MEDICINE

## 2024-07-20 PROCEDURE — 80048 BASIC METABOLIC PNL TOTAL CA: CPT | Performed by: EMERGENCY MEDICINE

## 2024-07-20 RX ORDER — BISACODYL 10 MG
10 SUPPOSITORY, RECTAL RECTAL DAILY PRN
Status: CANCELLED | OUTPATIENT
Start: 2024-07-20

## 2024-07-20 RX ORDER — ONDANSETRON 2 MG/ML
1 INJECTION INTRAMUSCULAR; INTRAVENOUS ONCE
Status: COMPLETED | OUTPATIENT
Start: 2024-07-20 | End: 2024-07-20

## 2024-07-20 RX ORDER — MIRTAZAPINE 7.5 MG/1
7.5 TABLET, FILM COATED ORAL
Status: CANCELLED | OUTPATIENT
Start: 2024-07-20

## 2024-07-20 RX ORDER — POLYETHYLENE GLYCOL 3350 17 G/17G
17 POWDER, FOR SOLUTION ORAL 2 TIMES DAILY
Status: CANCELLED | OUTPATIENT
Start: 2024-07-20

## 2024-07-20 RX ORDER — PROPRANOLOL HCL 60 MG
60 CAPSULE, EXTENDED RELEASE 24HR ORAL DAILY
Status: CANCELLED | OUTPATIENT
Start: 2024-07-21

## 2024-07-20 RX ORDER — ATORVASTATIN CALCIUM 80 MG/1
80 TABLET, FILM COATED ORAL DAILY
Status: CANCELLED | OUTPATIENT
Start: 2024-07-21

## 2024-07-20 RX ORDER — BACLOFEN 10 MG/1
5 TABLET ORAL 3 TIMES DAILY
Status: CANCELLED | OUTPATIENT
Start: 2024-07-20

## 2024-07-20 RX ORDER — METHENAMINE HIPPURATE 1000 MG/1
1 TABLET ORAL 2 TIMES DAILY WITH MEALS
Status: CANCELLED | OUTPATIENT
Start: 2024-07-21

## 2024-07-20 RX ORDER — GABAPENTIN 300 MG/1
300 CAPSULE ORAL 2 TIMES DAILY
Status: CANCELLED | OUTPATIENT
Start: 2024-07-20

## 2024-07-20 RX ORDER — LATANOPROST 50 UG/ML
1 SOLUTION/ DROPS OPHTHALMIC
Status: CANCELLED | OUTPATIENT
Start: 2024-07-20

## 2024-07-20 RX ORDER — CLOPIDOGREL BISULFATE 75 MG/1
75 TABLET ORAL DAILY
Status: CANCELLED | OUTPATIENT
Start: 2024-07-21

## 2024-07-20 RX ORDER — BUDESONIDE AND FORMOTEROL FUMARATE DIHYDRATE 160; 4.5 UG/1; UG/1
2 AEROSOL RESPIRATORY (INHALATION) 2 TIMES DAILY
Status: CANCELLED | OUTPATIENT
Start: 2024-07-20

## 2024-07-20 RX ORDER — AMOXICILLIN 250 MG
2 CAPSULE ORAL
Status: CANCELLED | OUTPATIENT
Start: 2024-07-20

## 2024-07-20 RX ORDER — AMLODIPINE BESYLATE 10 MG/1
10 TABLET ORAL DAILY
Status: CANCELLED | OUTPATIENT
Start: 2024-07-21

## 2024-07-20 RX ORDER — PANTOPRAZOLE SODIUM 40 MG/1
40 TABLET, DELAYED RELEASE ORAL DAILY
Status: CANCELLED | OUTPATIENT
Start: 2024-07-21

## 2024-07-20 RX ADMIN — SODIUM CHLORIDE 1000 ML: 0.9 INJECTION, SOLUTION INTRAVENOUS at 17:33

## 2024-07-20 RX ADMIN — IOHEXOL 100 ML: 350 INJECTION, SOLUTION INTRAVENOUS at 20:12

## 2024-07-21 NOTE — ED PROVIDER NOTES
History  Chief Complaint   Patient presents with    Abdominal Pain    Blood in Urine     Pt arrives via aems from home, c/o abd pain, n/v, dizziness and blood in urine x2 days. Pt reports suprapubic catheter was changed a few days ago.      72 YO male with Hx of MS, suprapubic catheter in place, presents for abdominal pain and lightheadedness since yesterday. Patient states this has been constant, pain has improved since onset. He has not taken anything for this. He denies any fevers. Patient states he feels lightheaded with movement. He notes he is currently being treated for a urinary tract infection and states his symptoms are not typical of previous urinary tract infection. Patient states he does have constipation, he takes Miralax for this twice daily, states he did have a bowel movement yesterday, feels this has not changed recently. Pt denies CP/SOB/F/C/N/V/D/C, no dysuria, burning on urination or blood in urine.       History provided by:  Patient   used: No    Blood in Urine  Irritative symptoms do not include frequency. Associated symptoms include abdominal pain. Pertinent negatives include no dysuria, fever, nausea or vomiting.       Prior to Admission Medications   Prescriptions Last Dose Informant Patient Reported? Taking?   Empagliflozin (JARDIANCE) 10 MG TABS tablet 7/20/2024 Outside Facility (Specify) Yes Yes   Sig: Take 10 mg by mouth every morning   Ergocalciferol (VITAMIN D2 PO) 7/20/2024 Outside Facility (Specify) Yes Yes   Sig: Take 50,000 Units by mouth once a week   acetaminophen (TYLENOL) 325 mg tablet Past Month Outside Facility (Specify) No Yes   Sig: Take 2 tablets (650 mg total) by mouth every 6 (six) hours as needed for mild pain   acetaminophen (TYLENOL) 325 mg tablet Past Month Outside Facility (Specify) No Yes   Sig: Take 3 tablets (975 mg total) by mouth every 8 (eight) hours   albuterol (2.5 mg/3 mL) 0.083 % nebulizer solution Past Month Outside Facility  (Specify) No Yes   Sig: Take 3 mL (2.5 mg total) by nebulization every 6 (six) hours as needed for wheezing or shortness of breath   amLODIPine-atorvastatin (CADUET) 10-80 MG per tablet 2024 Outside Facility (Specify) Yes Yes   Sig: Take 1 tablet by mouth daily   baclofen 10 mg tablet 2024 Outside Facility (Specify) Yes Yes   Sig: Take 5 mg by mouth 3 (three) times a day   bisacodyl (DULCOLAX) 10 mg suppository 2024 Outside Facility (Specify) Yes Yes   Sig: Insert 10 mg into the rectum daily as needed for constipation   budesonide-formoterol (SYMBICORT) 160-4.5 mcg/act inhaler 2024 Outside Facility (Specify) Yes Yes   Sig: Inhale 2 puffs 2 (two) times a day Rinse mouth after use.   clopidogrel (PLAVIX) 75 mg tablet 2024 Outside Facility (Specify) No Yes   Sig: Take 1 tablet (75 mg total) by mouth daily   cromolyn (NASALCHROM) 5.2 MG/ACT nasal spray 2024  No Yes   Si spray into each nostril 3 (three) times a day   cyanocobalamin (VITAMIN B-12) 500 MCG tablet 2024 Outside Facility (Specify) No Yes   Sig: Take 1 tablet (500 mcg total) by mouth daily   gabapentin (NEURONTIN) 300 mg capsule 2024 Outside Facility (Specify) No Yes   Sig: Take 1 capsule (300 mg total) by mouth 2 (two) times a day   gabapentin (NEURONTIN) 300 mg capsule 2024 Outside Facility (Specify) No Yes   Sig: Take 2 capsules (600 mg total) by mouth daily at bedtime   latanoprost (XALATAN) 0.005 % ophthalmic solution 2024 Outside Facility (Specify) Yes Yes   Sig: Administer 1 drop to both eyes daily at bedtime   lidocaine (LIDODERM) 5 % 2024 Outside Facility (Specify) No Yes   Sig: Apply 1 patch topically over 12 hours daily Remove & Discard patch within 12 hours or as directed by MD   melatonin 3 mg 2024 Outside Facility (Specify) Yes Yes   Sig: Take 3 mg by mouth daily at bedtime as needed   metFORMIN (GLUCOPHAGE) 1000 MG tablet 2024 Outside Facility (Specify) Yes Yes   Sig: Take  1,000 mg by mouth 2 (two) times a day with meals   methenamine hippurate (HIPREX) 1 g tablet 7/20/2024 Outside Facility (Specify) No Yes   Sig: Take 1 tablet (1 g total) by mouth 2 (two) times a day with meals   mirtazapine (REMERON) 7.5 MG tablet 7/20/2024 Outside Facility (Specify) Yes Yes   Sig: Take 7.5 mg by mouth daily at bedtime   pantoprazole (PROTONIX) 40 mg tablet 7/20/2024 Outside Facility (Specify) Yes Yes   Sig: Take 40 mg by mouth daily   polyethylene glycol (MIRALAX) 17 g packet 7/20/2024  No Yes   Sig: Take 17 g by mouth 2 (two) times a day   propranolol (INDERAL LA) 60 mg 24 hr capsule 7/20/2024 Outside Facility (Specify) Yes Yes   Sig: Take 60 mg by mouth daily   senna-docusate sodium (SENOKOT S) 8.6-50 mg per tablet 7/20/2024 Outside Facility (Specify) Yes Yes   Sig: Take 2 tablets by mouth daily at bedtime      Facility-Administered Medications: None       Past Medical History:   Diagnosis Date    Acute laryngitis     Acute nonsuppurative otitis media, unspecified laterality     Arm weakness     Arthritis     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HCC)     Bladder infection     Bronchitis     Constipation     Cough     Diabetes (HCC)     Diabetes mellitus (HCC)     Dizziness     Dysfunction of eustachian tube     Erectile dysfunction of non-organic origin     Fatigue     Glaucoma     Hiatal hernia     Hypertension     Imbalance     Leg muscle spasm     MS (multiple sclerosis) (HCC)     Multiple sclerosis (HCC)     Nephrolithiasis     Neurogenic bladder     No natural teeth     Sinus pain     Spinal stenosis     Strain of thoracic region     Stroke ([facility])     Suprapubic catheter ([facility])        Past Surgical History:   Procedure Laterality Date    APPENDECTOMY      BRAIN SURGERY      Coil placed in aneurysm    CEREBRAL ANEURYSM REPAIR      CYSTOSCOPY      CYSTOSCOPY      CYSTOSCOPY  06/11/2018    CYSTOSCOPY  01/15/2021    EYE SURGERY      transscleral cyclophotocoagulation noncontact YAG laser    IR SUPRAPUBIC  CATHETER CHECK/CHANGE/REINSERTION/UPSIZE  3/28/2024    MYRINGOTOMY      with ventilation tube insertion    OH LITHOLAPAXY SMPL/SM <2.5 CM N/A 2019    Procedure: CYSTOSCOPY, holmium laser litholapaxy of bladder stones, EXCHANGE OF SP TUBE;  Surgeon: Javy Jeffries MD;  Location: [facility];  Service: Urology    SUPRAPUBIC CATHETER INSERTION         Family History   Problem Relation Age of Onset    Heart attack Mother     Stroke Mother     Heart attack Father     Anuerysm Father         In Stomach     Aneurysm Father      I have reviewed and agree with the history as documented.    E-Cigarette/Vaping    E-Cigarette Use Never User      E-Cigarette/Vaping Substances    Nicotine No     THC No     CBD No     Flavoring No     Other No     Unknown No      Social History     Tobacco Use    Smoking status: Former     Current packs/day: 0.00     Average packs/day: 0.5 packs/day for 31.0 years (15.5 ttl pk-yrs)     Types: Cigarettes     Start date:      Quit date:      Years since quittin.5    Smokeless tobacco: Never   Vaping Use    Vaping status: Never Used   Substance Use Topics    Alcohol use: Not Currently    Drug use: No       Review of Systems   Constitutional:  Negative for fever.   HENT:  Negative for dental problem.    Eyes:  Negative for visual disturbance.   Respiratory:  Negative for shortness of breath.    Cardiovascular:  Negative for chest pain.   Gastrointestinal:  Positive for abdominal pain and constipation. Negative for nausea and vomiting.   Genitourinary:  Negative for dysuria and frequency.   Musculoskeletal:  Negative for neck pain and neck stiffness.   Skin:  Negative for rash.   Neurological:  Positive for light-headedness. Negative for dizziness and weakness.   Psychiatric/Behavioral:  Negative for agitation, behavioral problems and confusion.    All other systems reviewed and are negative.      Physical Exam  Physical Exam  Vitals and nursing note reviewed.   Constitutional:        Appearance: He is well-developed.   HENT:      Head: Normocephalic and atraumatic.   Eyes:      Extraocular Movements: Extraocular movements intact.   Cardiovascular:      Rate and Rhythm: Normal rate.   Pulmonary:      Effort: Pulmonary effort is normal.   Abdominal:      General: There is no distension.      Tenderness: There is abdominal tenderness in the epigastric area.   Musculoskeletal:         General: Normal range of motion.      Cervical back: Normal range of motion.   Skin:     Findings: No rash.   Neurological:      Mental Status: He is alert and oriented to person, place, and time.   Psychiatric:         Behavior: Behavior normal.         Vital Signs  ED Triage Vitals [07/20/24 1706]   Temperature Pulse Respirations Blood Pressure SpO2   98.2 °F (36.8 °C) 96 18 161/74 96 %      Temp Source Heart Rate Source Patient Position - Orthostatic VS BP Location FiO2 (%)   Oral Monitor Lying Right arm --      Pain Score       5           Vitals:    07/20/24 1745 07/20/24 1845 07/20/24 1915 07/20/24 1945   BP: 130/60 126/58 163/90 122/57   Pulse: 92 80 82 74   Patient Position - Orthostatic VS: Lying  Lying Lying         Visual Acuity      ED Medications  Medications   ondansetron (FOR EMS ONLY) (ZOFRAN) 4 mg/2 mL injection 4 mg (0 mg Does not apply Given to EMS 7/20/24 1707)   sodium chloride 0.9 % bolus 1,000 mL (0 mL Intravenous Stopped 7/20/24 1826)   iohexol (OMNIPAQUE) 350 MG/ML injection (MULTI-DOSE) 100 mL (100 mL Intravenous Given 7/20/24 2012)       Diagnostic Studies  Results Reviewed       Procedure Component Value Units Date/Time    HS Troponin I 2hr [[id number]]  (Normal) Collected: 07/20/24 1922    Lab Status: Final result Specimen: Blood from Arm, Right Updated: 07/20/24 2020     hs TnI 2hr 5 ng/L      Delta 2hr hsTnI 0 ng/L     Urine Microscopic [[id number]]  (Abnormal) Collected: 07/20/24 1735    Lab Status: Final result Specimen: Urine, Indwelling Cage Catheter Updated: 07/20/24 1825     RBC, UA  Innumerable /hpf      WBC, UA Innumerable /hpf      Epithelial Cells None Seen /hpf      Bacteria, UA Occasional /hpf      Budding Yeast Present     Hyphae Yeast Present    Procalcitonin [[id number]]  (Normal) Collected: 07/20/24 1731    Lab Status: Final result Specimen: Blood from Arm, Right Updated: 07/20/24 1824     Procalcitonin <0.05 ng/ml     HS Troponin 0hr (reflex protocol) [[id number]]  (Normal) Collected: 07/20/24 1731    Lab Status: Final result Specimen: Blood from Arm, Right Updated: 07/20/24 1823     hs TnI 0hr 5 ng/L     Basic metabolic panel [[id number]]  (Abnormal) Collected: 07/20/24 1731    Lab Status: Final result Specimen: Blood from Arm, Right Updated: 07/20/24 1813     Sodium 134 mmol/L      Potassium 4.1 mmol/L      Chloride 100 mmol/L      CO2 27 mmol/L      ANION GAP 7 mmol/L      BUN 21 mg/dL      Creatinine 0.96 mg/dL      Glucose 287 mg/dL      Calcium 9.9 mg/dL      eGFR 77 ml/min/1.73sq m     Narrative:      National Kid[employer] guidelines for Chronic Kidney Disease (CKD):     Stage 1 with normal or high GFR (GFR > 90 mL/min/1.73 square meters)    Stage 2 Mild CKD (GFR = 60-89 mL/min/1.73 square meters)    Stage 3A Moderate CKD (GFR = 45-59 mL/min/1.73 square meters)    Stage 3B Moderate CKD (GFR = 30-44 mL/min/1.73 square meters)    Stage 4 Severe CKD (GFR = 15-29 mL/min/1.73 square meters)    Stage 5 End Stage CKD (GFR <15 mL/min/1.73 square meters)  Note: GFR calculation is accurate only with a steady state creatinine    Hepatic function panel [[id number]]  (Abnormal) Collected: 07/20/24 1731    Lab Status: Final result Specimen: Blood from Arm, Right Updated: 07/20/24 1813     Total Bilirubin 0.31 mg/dL      Bilirubin, Direct 0.08 mg/dL      Alkaline Phosphatase 100 U/L      AST 11 U/L      ALT 10 U/L      Total Protein 7.6 g/dL      Albumin 3.9 g/dL     Magnesium [[id number]]  (Normal) Collected: 07/20/24 1731    Lab Status: Final result Specimen: Blood from Arm, Right  Updated: 07/20/24 1813     Magnesium 2.0 mg/dL     Lipase [[id number]]  (Normal) Collected: 07/20/24 1731    Lab Status: Final result Specimen: Blood from Arm, Right Updated: 07/20/24 1813     Lipase 29 u/L     Lactic acid, plasma (w/reflex if result > 2.0) [[id number]]  (Normal) Collected: 07/20/24 1731    Lab Status: Final result Specimen: Blood from Arm, Right Updated: 07/20/24 1800     LACTIC ACID 1.8 mmol/L     Narrative:      Result may be elevated if tourniquet was used during collection.    UA (URINE) with reflex to Scope [[id number]]  (Abnormal) Collected: 07/20/24 1735    Lab Status: Final result Specimen: Urine, Indwelling Cage Catheter Updated: 07/20/24 1756     Color, UA Brown     Clarity, UA Extra Turbid     Specific Gravity, UA 1.019     pH, UA 5.5     Leukocytes, UA Large     Nitrite, UA Positive     Protein, UA 30 (1+) mg/dl      Glucose, UA >=1000 (1%) mg/dl      Ketones, UA Negative mg/dl      Urobilinogen, UA <2.0 mg/dl      Bilirubin, UA Negative     Occult Blood, UA Large    CBC and differential [[id number]]  (Abnormal) Collected: 07/20/24 1731    Lab Status: Final result Specimen: Blood from Arm, Right Updated: 07/20/24 1743     WBC 11.50 Thousand/uL      RBC 5.30 Million/uL      Hemoglobin 14.8 g/dL      Hematocrit 45.2 %      MCV 85 fL      MCH 27.9 pg      MCHC 32.7 g/dL      RDW 13.6 %      MPV 8.4 fL      Platelets 354 Thousands/uL      nRBC 0 /100 WBCs      Segmented % 57 %      Immature Grans % 0 %      Lymphocytes % 28 %      Monocytes % 8 %      Eosinophils Relative 6 %      Basophils Relative 1 %      Absolute Neutrophils 6.45 Thousands/µL      Absolute Immature Grans 0.03 Thousand/uL      Absolute Lymphocytes 3.22 Thousands/µL      Absolute Monocytes 0.93 Thousand/µL      Eosinophils Absolute 0.73 Thousand/µL      Basophils Absolute 0.14 Thousands/µL     Urine culture [[id number]] Collected: 07/20/24 1735    Lab Status: In process Specimen: Urine, Clean Catch Updated: 07/20/24 1739                    CT abdomen pelvis with contrast   Final Result by Luis Hinson MD (07/20 2132)      1.  Findings consistent with cystitis   2.  Rectal wall thickening with surrounding inflammatory change, consistent with stercoral colitis         Workstation performed: [id number]                    Procedures  Procedures         ED Course  ED Course as of 07/20/24 2225   Sat Jul 20, 2024 1917 Patient states he is feeling better.   2117 I reviewed CT imaging prior to radiology reading: Large stool burden with fecal impaction as previously seen on recent CT's.   2152 Reviewed CT findings suggestive of cystitis. Patient denies fever, states he has had UTI's in the past and current symptoms are not similar. He has been afebrile and WBC is <12, procalcitonin and lactic acid are negative. He does have chronic constipation.   2157 Offered to treat patient's constipation, he states he would like to wait until he gets home.                                  SBIRT 22yo+      Flowsheet Row Most Recent Value   Initial Alcohol Screen: US AUDIT-C     1. How often do you have a drink containing alcohol? 0 Filed at: 07/20/2024 1856   2. How many drinks containing alcohol do you have on a typical day you are drinking?  0 Filed at: 07/20/2024 1856   3b. FEMALE Any Age, or MALE 65+: How often do you have 4 or more drinks on one occassion? 0 Filed at: 07/20/2024 1856   Audit-C Score 0 Filed at: 07/20/2024 1856   ALPESH: How many times in the past year have you...    Used an illegal drug or used a prescription medication for non-medical reasons? Never Filed at: 07/20/2024 1856                      Medical Decision Making  1. Abdominal pain - Patient states this has been present since yesterday. Will order ECG and troponin to rule out acute MI, CBC for leukocytosis, metabolic panel for electrolyte abnormalities and dehydration,  LFT's to assess GB dysfunction, lipase for pancreatitis, urine for infection, will check lactic acid and  procalcitonin as patient is likely colonized due to his suprapubic catheter. Will order CT of the abdomen and pelvis.     2. Lightheadedness - Checking ECG and troponin, CBC for anemia, metabolic panel for electrolyte abnormalities and dehydration, will give IV fluids and reassess.     Problems Addressed:  Abdominal pain: acute illness or injury  Constipation: chronic illness or injury  Lightheadedness: undiagnosed new problem with uncertain prognosis    Amount and/or Complexity of Data Reviewed  External Data Reviewed: radiology and notes.  Labs: ordered.  Radiology: ordered.    Risk  Prescription drug management.                 Disposition  Final diagnoses:   Lightheadedness   Abdominal pain   Constipation     Time reflects when diagnosis was documented in both MDM as applicable and the Disposition within this note       Time User Action Codes Description Comment    7/20/2024 10:04 PM Mario Sosa [R42] Lightheadedness     7/20/2024 10:04 PM Mario Sosa Add [R10.9] Abdominal pain     7/20/2024 10:04 PM Mario Sosa Add [K59.00] Constipation           ED Disposition       ED Disposition   Discharge    Condition   Stable    Date/Time   Sat Jul 20, 2024 2204    Comment   Mathew Rico discharge to home/self care.                   Follow-up Information       Follow up With Specialties Details Why Contact Info    Carolina Kumari MD Palliative Care   [address] [facility] [address] [phone]              Patient's Medications   Discharge Prescriptions    No medications on file       No discharge procedures on file.    PDMP Review         Value Time User    PDMP Reviewed  Yes 4/1/2024  3:21 AM Ar Beltran DO            ED Provider  Electronically Signed by             Mario Sosa MD  07/20/24 6482

## 2024-07-21 NOTE — DISCHARGE INSTRUCTIONS
Return to the ER if you develop symptoms typical of your usual urinary infections, if you have fevers greater than 100.4 or worsening lightheadedness.    Make sure to drink lots of fluids to prevent dehydration.    Continue to take your Miralax.

## 2024-07-22 LAB
ATRIAL RATE: 78 BPM
ATRIAL RATE: 97 BPM
P AXIS: 60 DEGREES
P AXIS: 62 DEGREES
PR INTERVAL: 180 MS
PR INTERVAL: 186 MS
QRS AXIS: 70 DEGREES
QRS AXIS: 74 DEGREES
QRSD INTERVAL: 94 MS
QRSD INTERVAL: 96 MS
QT INTERVAL: 360 MS
QT INTERVAL: 382 MS
QTC INTERVAL: 435 MS
QTC INTERVAL: 457 MS
T WAVE AXIS: 44 DEGREES
T WAVE AXIS: 47 DEGREES
VENTRICULAR RATE: 78 BPM
VENTRICULAR RATE: 97 BPM

## 2024-07-22 PROCEDURE — 93010 ELECTROCARDIOGRAM REPORT: CPT | Performed by: INTERNAL MEDICINE

## 2024-07-23 LAB — BACTERIA UR CULT: ABNORMAL

## 2024-07-26 ENCOUNTER — HOSPITAL ENCOUNTER (INPATIENT)
Facility: HOSPITAL | Age: 74
LOS: 1 days | Discharge: HOME | DRG: 690 | End: 2024-07-28
Attending: EMERGENCY MEDICINE | Admitting: INTERNAL MEDICINE
Payer: MEDICARE

## 2024-07-26 ENCOUNTER — APPOINTMENT (EMERGENCY)
Dept: CT IMAGING | Facility: HOSPITAL | Age: 74
DRG: 690 | End: 2024-07-26
Payer: MEDICARE

## 2024-07-26 DIAGNOSIS — R42 DIZZINESS: Primary | ICD-10-CM

## 2024-07-26 PROBLEM — L29.9 PRURITUS: Status: ACTIVE | Noted: 2024-07-26

## 2024-07-26 PROBLEM — N30.00 ACUTE CYSTITIS WITHOUT HEMATURIA: Status: ACTIVE | Noted: 2024-07-26

## 2024-07-26 LAB
2HR DELTA HS TROPONIN: 2 NG/L
ALBUMIN SERPL BCG-MCNC: 4.1 G/DL (ref 3.5–5)
ALP SERPL-CCNC: 96 U/L (ref 34–104)
ALT SERPL W P-5'-P-CCNC: 15 U/L (ref 7–52)
ANION GAP SERPL CALCULATED.3IONS-SCNC: 9 MMOL/L (ref 4–13)
APTT PPP: 28 SECONDS (ref 23–37)
AST SERPL W P-5'-P-CCNC: 27 U/L (ref 13–39)
ATRIAL RATE: 78 BPM
ATRIAL RATE: 80 BPM
BACTERIA UR QL AUTO: ABNORMAL /HPF
BASOPHILS # BLD AUTO: 0.11 THOUSANDS/ÂΜL (ref 0–0.1)
BASOPHILS NFR BLD AUTO: 1 % (ref 0–1)
BILIRUB SERPL-MCNC: 0.35 MG/DL (ref 0.2–1)
BILIRUB UR QL STRIP: NEGATIVE
BUDDING YEAST: PRESENT
BUN SERPL-MCNC: 21 MG/DL (ref 5–25)
CALCIUM SERPL-MCNC: 10.2 MG/DL (ref 8.4–10.2)
CARDIAC TROPONIN I PNL SERPL HS: 4 NG/L
CARDIAC TROPONIN I PNL SERPL HS: 6 NG/L
CHLORIDE SERPL-SCNC: 97 MMOL/L (ref 96–108)
CLARITY UR: CLEAR
CO2 SERPL-SCNC: 27 MMOL/L (ref 21–32)
COLOR UR: COLORLESS
CREAT SERPL-MCNC: 1.09 MG/DL (ref 0.6–1.3)
EOSINOPHIL # BLD AUTO: 0.63 THOUSAND/ÂΜL (ref 0–0.61)
EOSINOPHIL NFR BLD AUTO: 5 % (ref 0–6)
ERYTHROCYTE [DISTWIDTH] IN BLOOD BY AUTOMATED COUNT: 13.4 % (ref 11.6–15.1)
GFR SERPL CREATININE-BSD FRML MDRD: 66 ML/MIN/1.73SQ M
GLUCOSE SERPL-MCNC: 231 MG/DL (ref 65–140)
GLUCOSE UR STRIP-MCNC: ABNORMAL MG/DL
HCT VFR BLD AUTO: 48.6 % (ref 36.5–49.3)
HGB BLD-MCNC: 15.6 G/DL (ref 12–17)
HGB UR QL STRIP.AUTO: NEGATIVE
IMM GRANULOCYTES # BLD AUTO: 0.04 THOUSAND/UL (ref 0–0.2)
IMM GRANULOCYTES NFR BLD AUTO: 0 % (ref 0–2)
INR PPP: 0.88 (ref 0.84–1.19)
KETONES UR STRIP-MCNC: NEGATIVE MG/DL
LACTATE SERPL-SCNC: 1 MMOL/L (ref 0.5–2)
LEUKOCYTE ESTERASE UR QL STRIP: ABNORMAL
LYMPHOCYTES # BLD AUTO: 2.93 THOUSANDS/ÂΜL (ref 0.6–4.47)
LYMPHOCYTES NFR BLD AUTO: 24 % (ref 14–44)
MAGNESIUM SERPL-MCNC: 2.2 MG/DL (ref 1.9–2.7)
MCH RBC QN AUTO: 27.4 PG (ref 26.8–34.3)
MCHC RBC AUTO-ENTMCNC: 32.1 G/DL (ref 31.4–37.4)
MCV RBC AUTO: 85 FL (ref 82–98)
MONOCYTES # BLD AUTO: 0.7 THOUSAND/ÂΜL (ref 0.17–1.22)
MONOCYTES NFR BLD AUTO: 6 % (ref 4–12)
NEUTROPHILS # BLD AUTO: 7.9 THOUSANDS/ÂΜL (ref 1.85–7.62)
NEUTS SEG NFR BLD AUTO: 64 % (ref 43–75)
NITRITE UR QL STRIP: NEGATIVE
NON-SQ EPI CELLS URNS QL MICRO: ABNORMAL /HPF
NRBC BLD AUTO-RTO: 0 /100 WBCS
P AXIS: 59 DEGREES
P AXIS: 68 DEGREES
PH UR STRIP.AUTO: 5.5 [PH]
PLATELET # BLD AUTO: 362 THOUSANDS/UL (ref 149–390)
PMV BLD AUTO: 8.5 FL (ref 8.9–12.7)
POTASSIUM SERPL-SCNC: 4.6 MMOL/L (ref 3.5–5.3)
POTASSIUM SERPL-SCNC: 5.7 MMOL/L (ref 3.5–5.3)
POTASSIUM SERPL-SCNC: 6 MMOL/L (ref 3.5–5.3)
PR INTERVAL: 180 MS
PR INTERVAL: 186 MS
PROT SERPL-MCNC: 8.8 G/DL (ref 6.4–8.4)
PROT UR STRIP-MCNC: NEGATIVE MG/DL
PROTHROMBIN TIME: 12.2 SECONDS (ref 11.6–14.5)
QRS AXIS: 70 DEGREES
QRS AXIS: 78 DEGREES
QRSD INTERVAL: 90 MS
QRSD INTERVAL: 94 MS
QT INTERVAL: 396 MS
QT INTERVAL: 396 MS
QTC INTERVAL: 451 MS
QTC INTERVAL: 456 MS
RBC # BLD AUTO: 5.69 MILLION/UL (ref 3.88–5.62)
RBC #/AREA URNS AUTO: ABNORMAL /HPF
SODIUM SERPL-SCNC: 133 MMOL/L (ref 135–147)
SP GR UR STRIP.AUTO: 1.05 (ref 1–1.03)
T WAVE AXIS: 49 DEGREES
T WAVE AXIS: 59 DEGREES
T4 FREE SERPL-MCNC: 1 NG/DL (ref 0.61–1.12)
TSH SERPL DL<=0.05 MIU/L-ACNC: 4.53 UIU/ML (ref 0.45–4.5)
URINE COMMENT: ABNORMAL
UROBILINOGEN UR STRIP-ACNC: <2 MG/DL
VENTRICULAR RATE: 78 BPM
VENTRICULAR RATE: 80 BPM
WBC # BLD AUTO: 12.31 THOUSAND/UL (ref 4.31–10.16)
WBC #/AREA URNS AUTO: ABNORMAL /HPF
WBC CLUMPS # UR AUTO: PRESENT /UL

## 2024-07-26 PROCEDURE — 93005 ELECTROCARDIOGRAM TRACING: CPT

## 2024-07-26 PROCEDURE — 70498 CT ANGIOGRAPHY NECK: CPT

## 2024-07-26 PROCEDURE — 87077 CULTURE AEROBIC IDENTIFY: CPT

## 2024-07-26 PROCEDURE — 84443 ASSAY THYROID STIM HORMONE: CPT

## 2024-07-26 PROCEDURE — 85025 COMPLETE CBC W/AUTO DIFF WBC: CPT

## 2024-07-26 PROCEDURE — 85610 PROTHROMBIN TIME: CPT

## 2024-07-26 PROCEDURE — 99285 EMERGENCY DEPT VISIT HI MDM: CPT

## 2024-07-26 PROCEDURE — 80053 COMPREHEN METABOLIC PANEL: CPT

## 2024-07-26 PROCEDURE — 84132 ASSAY OF SERUM POTASSIUM: CPT

## 2024-07-26 PROCEDURE — 83735 ASSAY OF MAGNESIUM: CPT

## 2024-07-26 PROCEDURE — 84439 ASSAY OF FREE THYROXINE: CPT

## 2024-07-26 PROCEDURE — 36415 COLL VENOUS BLD VENIPUNCTURE: CPT

## 2024-07-26 PROCEDURE — 99222 1ST HOSP IP/OBS MODERATE 55: CPT | Performed by: INTERNAL MEDICINE

## 2024-07-26 PROCEDURE — 85730 THROMBOPLASTIN TIME PARTIAL: CPT

## 2024-07-26 PROCEDURE — 87186 SC STD MICRODIL/AGAR DIL: CPT

## 2024-07-26 PROCEDURE — 87086 URINE CULTURE/COLONY COUNT: CPT

## 2024-07-26 PROCEDURE — 93010 ELECTROCARDIOGRAM REPORT: CPT | Performed by: INTERNAL MEDICINE

## 2024-07-26 PROCEDURE — 84484 ASSAY OF TROPONIN QUANT: CPT

## 2024-07-26 PROCEDURE — 81001 URINALYSIS AUTO W/SCOPE: CPT

## 2024-07-26 PROCEDURE — 96374 THER/PROPH/DIAG INJ IV PUSH: CPT

## 2024-07-26 PROCEDURE — 70496 CT ANGIOGRAPHY HEAD: CPT

## 2024-07-26 PROCEDURE — 0T2BX0Z CHANGE DRAINAGE DEVICE IN BLADDER, EXTERNAL APPROACH: ICD-10-PCS | Performed by: INTERNAL MEDICINE

## 2024-07-26 PROCEDURE — 87106 FUNGI IDENTIFICATION YEAST: CPT

## 2024-07-26 PROCEDURE — 83605 ASSAY OF LACTIC ACID: CPT

## 2024-07-26 RX ORDER — LANOLIN ALCOHOL/MO/W.PET/CERES
3 CREAM (GRAM) TOPICAL
Status: DISCONTINUED | OUTPATIENT
Start: 2024-07-26 | End: 2024-07-28 | Stop reason: HOSPADM

## 2024-07-26 RX ORDER — ATORVASTATIN CALCIUM 80 MG/1
80 TABLET, FILM COATED ORAL
Status: DISCONTINUED | OUTPATIENT
Start: 2024-07-26 | End: 2024-07-28 | Stop reason: HOSPADM

## 2024-07-26 RX ORDER — PROPRANOLOL HCL 60 MG
60 CAPSULE, EXTENDED RELEASE 24HR ORAL DAILY
Status: DISCONTINUED | OUTPATIENT
Start: 2024-07-26 | End: 2024-07-28 | Stop reason: HOSPADM

## 2024-07-26 RX ORDER — CROMOLYN SODIUM 5.2 MG
1 AEROSOL, SPRAY WITH PUMP (ML) NASAL 3 TIMES DAILY
Status: DISCONTINUED | OUTPATIENT
Start: 2024-07-26 | End: 2024-07-26 | Stop reason: RX

## 2024-07-26 RX ORDER — AZELASTINE 1 MG/ML
1 SPRAY, METERED NASAL EVERY 12 HOURS SCHEDULED
Status: DISCONTINUED | OUTPATIENT
Start: 2024-07-26 | End: 2024-07-26

## 2024-07-26 RX ORDER — AZELASTINE 1 MG/ML
2 SPRAY, METERED NASAL EVERY 12 HOURS SCHEDULED
Status: DISCONTINUED | OUTPATIENT
Start: 2024-07-26 | End: 2024-07-28 | Stop reason: HOSPADM

## 2024-07-26 RX ORDER — AMLODIPINE BESYLATE 10 MG/1
10 TABLET ORAL DAILY
Status: DISCONTINUED | OUTPATIENT
Start: 2024-07-26 | End: 2024-07-28 | Stop reason: HOSPADM

## 2024-07-26 RX ORDER — AMOXICILLIN 250 MG
2 CAPSULE ORAL
Status: DISCONTINUED | OUTPATIENT
Start: 2024-07-26 | End: 2024-07-28 | Stop reason: HOSPADM

## 2024-07-26 RX ORDER — CLOPIDOGREL BISULFATE 75 MG/1
75 TABLET ORAL DAILY
Status: DISCONTINUED | OUTPATIENT
Start: 2024-07-26 | End: 2024-07-28 | Stop reason: HOSPADM

## 2024-07-26 RX ORDER — LIDOCAINE 50 MG/G
1 PATCH TOPICAL DAILY
Status: DISCONTINUED | OUTPATIENT
Start: 2024-07-26 | End: 2024-07-28 | Stop reason: HOSPADM

## 2024-07-26 RX ORDER — MIRTAZAPINE 7.5 MG/1
7.5 TABLET, FILM COATED ORAL
Status: DISCONTINUED | OUTPATIENT
Start: 2024-07-26 | End: 2024-07-28 | Stop reason: HOSPADM

## 2024-07-26 RX ORDER — ONDANSETRON 2 MG/ML
4 INJECTION INTRAMUSCULAR; INTRAVENOUS EVERY 6 HOURS PRN
Status: DISCONTINUED | OUTPATIENT
Start: 2024-07-26 | End: 2024-07-28 | Stop reason: HOSPADM

## 2024-07-26 RX ORDER — POLYETHYLENE GLYCOL 3350 17 G/17G
17 POWDER, FOR SOLUTION ORAL 2 TIMES DAILY
Status: DISCONTINUED | OUTPATIENT
Start: 2024-07-26 | End: 2024-07-28 | Stop reason: HOSPADM

## 2024-07-26 RX ORDER — GABAPENTIN 300 MG/1
600 CAPSULE ORAL
Status: DISCONTINUED | OUTPATIENT
Start: 2024-07-26 | End: 2024-07-28 | Stop reason: HOSPADM

## 2024-07-26 RX ORDER — BUDESONIDE AND FORMOTEROL FUMARATE DIHYDRATE 160; 4.5 UG/1; UG/1
2 AEROSOL RESPIRATORY (INHALATION) 2 TIMES DAILY
Status: DISCONTINUED | OUTPATIENT
Start: 2024-07-26 | End: 2024-07-28 | Stop reason: HOSPADM

## 2024-07-26 RX ORDER — MECLIZINE HYDROCHLORIDE 25 MG/1
25 TABLET ORAL ONCE
Status: COMPLETED | OUTPATIENT
Start: 2024-07-26 | End: 2024-07-26

## 2024-07-26 RX ORDER — LATANOPROST 50 UG/ML
1 SOLUTION/ DROPS OPHTHALMIC
Status: DISCONTINUED | OUTPATIENT
Start: 2024-07-26 | End: 2024-07-28 | Stop reason: HOSPADM

## 2024-07-26 RX ORDER — PANTOPRAZOLE SODIUM 40 MG/1
40 TABLET, DELAYED RELEASE ORAL
Status: DISCONTINUED | OUTPATIENT
Start: 2024-07-26 | End: 2024-07-28 | Stop reason: HOSPADM

## 2024-07-26 RX ORDER — GABAPENTIN 300 MG/1
300 CAPSULE ORAL 2 TIMES DAILY
Status: DISCONTINUED | OUTPATIENT
Start: 2024-07-26 | End: 2024-07-28 | Stop reason: HOSPADM

## 2024-07-26 RX ORDER — ALBUTEROL SULFATE 2.5 MG/3ML
2.5 SOLUTION RESPIRATORY (INHALATION) EVERY 6 HOURS PRN
Status: DISCONTINUED | OUTPATIENT
Start: 2024-07-26 | End: 2024-07-28 | Stop reason: HOSPADM

## 2024-07-26 RX ORDER — HYDROXYZINE HYDROCHLORIDE 10 MG/1
10 TABLET, FILM COATED ORAL EVERY 6 HOURS PRN
Status: DISCONTINUED | OUTPATIENT
Start: 2024-07-26 | End: 2024-07-28 | Stop reason: HOSPADM

## 2024-07-26 RX ORDER — ENOXAPARIN SODIUM 100 MG/ML
40 INJECTION SUBCUTANEOUS DAILY
Status: DISCONTINUED | OUTPATIENT
Start: 2024-07-26 | End: 2024-07-28 | Stop reason: HOSPADM

## 2024-07-26 RX ORDER — HYDROXYZINE HYDROCHLORIDE 10 MG/1
10 TABLET, FILM COATED ORAL ONCE
Status: COMPLETED | OUTPATIENT
Start: 2024-07-26 | End: 2024-07-26

## 2024-07-26 RX ORDER — BISACODYL 10 MG
10 SUPPOSITORY, RECTAL RECTAL DAILY PRN
Status: DISCONTINUED | OUTPATIENT
Start: 2024-07-26 | End: 2024-07-28 | Stop reason: HOSPADM

## 2024-07-26 RX ORDER — SULFAMETHOXAZOLE AND TRIMETHOPRIM 800; 160 MG/1; MG/1
1 TABLET ORAL EVERY 12 HOURS SCHEDULED
Status: DISCONTINUED | OUTPATIENT
Start: 2024-07-26 | End: 2024-07-26

## 2024-07-26 RX ORDER — MECLIZINE HCL 12.5 MG/1
12.5 TABLET ORAL EVERY 8 HOURS PRN
Status: DISCONTINUED | OUTPATIENT
Start: 2024-07-26 | End: 2024-07-28 | Stop reason: HOSPADM

## 2024-07-26 RX ORDER — ACETAMINOPHEN 325 MG/1
650 TABLET ORAL EVERY 6 HOURS PRN
Status: DISCONTINUED | OUTPATIENT
Start: 2024-07-26 | End: 2024-07-28 | Stop reason: HOSPADM

## 2024-07-26 RX ORDER — BACLOFEN 10 MG/1
5 TABLET ORAL 3 TIMES DAILY
Status: DISCONTINUED | OUTPATIENT
Start: 2024-07-26 | End: 2024-07-28 | Stop reason: HOSPADM

## 2024-07-26 RX ORDER — FAMOTIDINE 10 MG/ML
20 INJECTION, SOLUTION INTRAVENOUS ONCE
Status: COMPLETED | OUTPATIENT
Start: 2024-07-26 | End: 2024-07-26

## 2024-07-26 RX ADMIN — LATANOPROST 1 DROP: 50 SOLUTION/ DROPS OPHTHALMIC at 21:09

## 2024-07-26 RX ADMIN — BACLOFEN 5 MG: 10 TABLET ORAL at 16:54

## 2024-07-26 RX ADMIN — AZELASTINE 2 SPRAY: 1 SPRAY, METERED NASAL at 21:09

## 2024-07-26 RX ADMIN — GABAPENTIN 300 MG: 300 CAPSULE ORAL at 08:11

## 2024-07-26 RX ADMIN — FAMOTIDINE 20 MG: 10 INJECTION, SOLUTION INTRAVENOUS at 04:45

## 2024-07-26 RX ADMIN — BUDESONIDE AND FORMOTEROL FUMARATE DIHYDRATE 2 PUFF: 160; 4.5 AEROSOL RESPIRATORY (INHALATION) at 18:08

## 2024-07-26 RX ADMIN — GABAPENTIN 300 MG: 300 CAPSULE ORAL at 17:00

## 2024-07-26 RX ADMIN — HYDROXYZINE HYDROCHLORIDE 10 MG: 10 TABLET ORAL at 06:40

## 2024-07-26 RX ADMIN — AMLODIPINE BESYLATE 10 MG: 10 TABLET ORAL at 08:11

## 2024-07-26 RX ADMIN — CLOPIDOGREL BISULFATE 75 MG: 75 TABLET ORAL at 08:11

## 2024-07-26 RX ADMIN — BACLOFEN 5 MG: 10 TABLET ORAL at 08:12

## 2024-07-26 RX ADMIN — PROPRANOLOL HYDROCHLORIDE 60 MG: 60 CAPSULE, EXTENDED RELEASE ORAL at 10:48

## 2024-07-26 RX ADMIN — POLYETHYLENE GLYCOL 3350 17 G: 17 POWDER, FOR SOLUTION ORAL at 17:00

## 2024-07-26 RX ADMIN — BACLOFEN 5 MG: 10 TABLET ORAL at 21:09

## 2024-07-26 RX ADMIN — ATORVASTATIN CALCIUM 80 MG: 80 TABLET, FILM COATED ORAL at 16:54

## 2024-07-26 RX ADMIN — MECLIZINE 12.5 MG: 12.5 TABLET ORAL at 13:22

## 2024-07-26 RX ADMIN — IOHEXOL 85 ML: 350 INJECTION, SOLUTION INTRAVENOUS at 03:27

## 2024-07-26 RX ADMIN — BUDESONIDE AND FORMOTEROL FUMARATE DIHYDRATE 2 PUFF: 160; 4.5 AEROSOL RESPIRATORY (INHALATION) at 10:48

## 2024-07-26 RX ADMIN — HYDROXYZINE HYDROCHLORIDE 10 MG: 10 TABLET ORAL at 13:22

## 2024-07-26 RX ADMIN — AZELASTINE 2 SPRAY: 1 SPRAY, METERED NASAL at 13:18

## 2024-07-26 RX ADMIN — SULFAMETHOXAZOLE AND TRIMETHOPRIM 1 TABLET: 800; 160 TABLET ORAL at 08:12

## 2024-07-26 RX ADMIN — MIRTAZAPINE 7.5 MG: 7.5 TABLET, FILM COATED ORAL at 21:09

## 2024-07-26 RX ADMIN — GABAPENTIN 600 MG: 300 CAPSULE ORAL at 21:09

## 2024-07-26 RX ADMIN — PANTOPRAZOLE SODIUM 40 MG: 40 TABLET, DELAYED RELEASE ORAL at 06:40

## 2024-07-26 RX ADMIN — MECLIZINE HYDROCHLORIDE 25 MG: 25 TABLET ORAL at 04:49

## 2024-07-26 RX ADMIN — ENOXAPARIN SODIUM 40 MG: 40 INJECTION SUBCUTANEOUS at 08:13

## 2024-07-26 RX ADMIN — SENNOSIDES AND DOCUSATE SODIUM 2 TABLET: 50; 8.6 TABLET ORAL at 21:09

## 2024-07-26 RX ADMIN — CYANOCOBALAMIN TAB 500 MCG 500 MCG: 500 TAB at 08:12

## 2024-07-26 NOTE — RESTORATIVE TECHNICIAN NOTE
Restorative Technician Note      Patient Name: Mathew Rico     Restorative Tech Visit Date: 07/26/24  Note Type: Mobility  Patient Position Upon Consult: Supine  Activity Performed: Range of motion; Repositioned  Patient Position at End of Consult: All needs within reach; Supine

## 2024-07-26 NOTE — PROGRESS NOTES
Patient is alert and oriented to person, place and situation. Dual skin assessment completed with Susana Minaya LPN, Anal redness, otherwise skin intact. Suprapubic cath in place, draining clear, yellow urine.

## 2024-07-26 NOTE — ASSESSMENT & PLAN NOTE
Wt Readings from Last 3 Encounters:   07/26/24 71.1 kg (156 lb 12 oz)   07/20/24 81.3 kg (179 lb 3.7 oz)   06/27/24 70 kg (154 lb 5.2 oz)   Clinically compensated  Cont to monitor fluid status

## 2024-07-26 NOTE — ASSESSMENT & PLAN NOTE
"Lab Results   Component Value Date    HGBA1C 8.0 (H) 06/16/2024       No results for input(s): \"POCGLU\" in the last 72 hours.    Blood Sugar Average: Last 72 hrs:  Hold metformin  Cont ISS  "

## 2024-07-26 NOTE — H&P
"Formerly [facility], [facility]  H&P  Name: Mathew Rico 73 y.o. male I MRN: [id number]  Unit/Bed#: E2 -01 I Date of Admission: 7/26/2024   Date of Service: 7/26/2024 I Hospital Day: 0      Assessment & Plan   * Dizziness  Assessment & Plan  S/p CTH, CTA  Reports of vertiginous symptoms  Possibly secondary to vertigo vs acute cystitis  Trial of meclizine and abx txt. If no improvement consider MRI given hx of CVA  S/p CTA revealing L internal carotid artery stenosis 50-69%; referral to ambulatory vascular sx eval  Neg troponin    Acute cystitis without hematuria  Assessment & Plan  Possibly secondary to presence of chronic indwelling supra pubic catheter  Catheter changed per visiting RN prior to presentation  Most recent ur cx  revealing staph aureus (7/20/24); pt reporting was treated as outpt with unknown abx  Pending UA  Will empirically place on bactrim  + leukocytosis but is chronic. Pt afebrile  F/u UA, Ur cx    Pruritus  Assessment & Plan  Unclear etiology  Pt reports he was txt with unknown abx as outpt  No evidence of rash or anaphylaxis  Had symptoms prior to receiving meclizine thus doubt etiology. ? If due to contrast but no prior hx of allergy to this  Trial of prn hydroxyzine   Cont supportive care    Chronic diastolic congestive heart failure (HCC)  Assessment & Plan  Wt Readings from Last 3 Encounters:   07/26/24 71.1 kg (156 lb 12 oz)   07/20/24 81.3 kg (179 lb 3.7 oz)   06/27/24 70 kg (154 lb 5.2 oz)   Clinically compensated  Cont to monitor fluid status            History of CVA (cerebrovascular accident)  Assessment & Plan  CVA prophylaxis with plavix/statin    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)  Assessment & Plan  Lab Results   Component Value Date    HGBA1C 8.0 (H) 06/16/2024       No results for input(s): \"POCGLU\" in the last 72 hours.    Blood Sugar Average: Last 72 hrs:  Hold metformin  Cont ISS    Multiple sclerosis (HCC)  Assessment & Plan  No sign of acute " flare  At baseline is bedbound; + neurogenic bladder with SPC  Cont baclofen    Primary hypertension  Assessment & Plan  Cont norvasc, inderal         VTE Prophylaxis: Enoxaparin (Lovenox)  / sequential compression device   Code Status: Full code  POLST: There is no POLST form on file for this patient (pre-hospital)  Discussion with family: Plan of care d/w patient    Anticipated Length of Stay:  Patient will be admitted on an Observation basis with an anticipated length of stay of  < 2 midnights.   Justification for Hospital Stay: Dizziness, acute cystitis    Total Time for Visit, including Counseling / Coordination of Care: 60 minutes.  Greater than 50% of this total time spent on direct patient counseling and coordination of care.    Chief Complaint:   Dizziness    History of Present Illness:    Mathew Rico is a 73 y.o. male medical history significant for multiple sclerosis, bedbound, positive suprapubic catheter in place secondary to neurogenic bladder, history of CVA, hypertension who presents with dizziness x 1 day.  He reports of the sensation of room spinning.  He denies loss of consciousness.  He had presented to the ER on 7/20/2024 secondary to abdominal pain.  Then CT was done suggestive of cystitis however patient with no significant leukocytosis, afebrile and low procalcitonin yesterday thus was monitored off antibiotics.  Given evidence of CT findings of constipation he was discharged home with bowel regimen.  He reports that when he was home he may have been given antibiotics as outpatient although he is unsure of the name.  He denies fever, chills, abdominal pain.  His suprapubic catheter was changed yesterday per visiting nurse.    Upon presentation to the ER, CT head and CTA was done.  Since being in the ER, he complains of pruritus though no evidence of rash/hives or anaphylaxis reaction.  He was given a dose of Pepcid.  He had been given dose of meclizine but reports that he had pruritus  prior to that.    Review of Systems:    Review of Systems   Skin:         + pruritus   Neurological:  Positive for dizziness and weakness.        Chronic b/l LE symmetric weakness         Past Medical and Surgical History:     Past Medical History:   Diagnosis Date    Acute laryngitis     Acute nonsuppurative otitis media, unspecified laterality     Arm weakness     Arthritis     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HCC)     Bladder infection     Bronchitis     Constipation     Cough     Diabetes (HCC)     Diabetes mellitus (HCC)     Dizziness     Dysfunction of eustachian tube     Erectile dysfunction of non-organic origin     Fatigue     Glaucoma     Hiatal hernia     Hypertension     Imbalance     Leg muscle spasm     MS (multiple sclerosis) (HCC)     Multiple sclerosis (HCC)     Nephrolithiasis     Neurogenic bladder     No natural teeth     Sinus pain     Spinal stenosis     Strain of thoracic region     Stroke ([facility])     Suprapubic catheter ([facility])        Past Surgical History:   Procedure Laterality Date    APPENDECTOMY      BRAIN SURGERY      Coil placed in aneurysm    CEREBRAL ANEURYSM REPAIR      CYSTOSCOPY      CYSTOSCOPY      CYSTOSCOPY  06/11/2018    CYSTOSCOPY  01/15/2021    EYE SURGERY      transscleral cyclophotocoagulation noncontact YAG laser    IR SUPRAPUBIC CATHETER CHECK/CHANGE/REINSERTION/UPSIZE  3/28/2024    MYRINGOTOMY      with ventilation tube insertion    DC LITHOLAPAXY SMPL/SM <2.5 CM N/A 5/7/2019    Procedure: CYSTOSCOPY, holmium laser litholapaxy of bladder stones, EXCHANGE OF SP TUBE;  Surgeon: Javy Jeffries MD;  Location: [facility];  Service: Urology    SUPRAPUBIC CATHETER INSERTION         Meds/Allergies:    Prior to Admission medications    Medication Sig Start Date End Date Taking? Authorizing Provider   acetaminophen (TYLENOL) 325 mg tablet Take 2 tablets (650 mg total) by mouth every 6 (six) hours as needed for mild pain 3/29/24   Cristin Booth MD   acetaminophen (TYLENOL) 325 mg tablet  Take 3 tablets (975 mg total) by mouth every 8 (eight) hours 4/2/24   SHA Sutton   albuterol (2.5 mg/3 mL) 0.083 % nebulizer solution Take 3 mL (2.5 mg total) by nebulization every 6 (six) hours as needed for wheezing or shortness of breath 10/3/23   Nelson Cabezas MD   amLODIPine-atorvastatin (CADUET) 10-80 MG per tablet Take 1 tablet by mouth daily    Historical Provider, MD   baclofen 10 mg tablet Take 5 mg by mouth 3 (three) times a day    Historical Provider, MD   bisacodyl (DULCOLAX) 10 mg suppository Insert 10 mg into the rectum daily as needed for constipation    Historical Provider, MD   budesonide-formoterol (SYMBICORT) 160-4.5 mcg/act inhaler Inhale 2 puffs 2 (two) times a day Rinse mouth after use.    Historical Provider, MD   clopidogrel (PLAVIX) 75 mg tablet Take 1 tablet (75 mg total) by mouth daily 10/27/18   Kraig Griffin MD   cromolyn (NASALCHROM) 5.2 MG/ACT nasal spray 1 spray into each nostril 3 (three) times a day 6/18/24   Steve Shook DO   cyanocobalamin (VITAMIN B-12) 500 MCG tablet Take 1 tablet (500 mcg total) by mouth daily 4/2/24   SHA Sutton   Empagliflozin (JARDIANCE) 10 MG TABS tablet Take 10 mg by mouth every morning    Historical Provider, MD   Ergocalciferol (VITAMIN D2 PO) Take 50,000 Units by mouth once a week    Historical Provider, MD   gabapentin (NEURONTIN) 300 mg capsule Take 1 capsule (300 mg total) by mouth 2 (two) times a day 6/3/21   Phan Vazquez DO   gabapentin (NEURONTIN) 300 mg capsule Take 2 capsules (600 mg total) by mouth daily at bedtime 6/3/21   Phan Vazquez DO   latanoprost (XALATAN) 0.005 % ophthalmic solution Administer 1 drop to both eyes daily at bedtime    Historical Provider, MD   lidocaine (LIDODERM) 5 % Apply 1 patch topically over 12 hours daily Remove & Discard patch within 12 hours or as directed by MD 4/3/24   SHA Sutton   melatonin 3 mg Take 3 mg by mouth daily at bedtime as needed     Historical Provider, MD   metFORMIN (GLUCOPHAGE) 1000 MG tablet Take 1,000 mg by mouth 2 (two) times a day with meals    Historical Provider, MD   methenamine hippurate (HIPREX) 1 g tablet Take 1 tablet (1 g total) by mouth 2 (two) times a day with meals 3/27/24   Brianna Ramirez PA-C   mirtazapine (REMERON) 7.5 MG tablet Take 7.5 mg by mouth daily at bedtime    Historical Provider, MD   pantoprazole (PROTONIX) 40 mg tablet Take 40 mg by mouth daily    Historical Provider, MD   polyethylene glycol (MIRALAX) 17 g packet Take 17 g by mouth 2 (two) times a day 24   Steve Shook DO   propranolol (INDERAL LA) 60 mg 24 hr capsule Take 60 mg by mouth daily    Historical Provider, MD   senna-docusate sodium (SENOKOT S) 8.6-50 mg per tablet Take 2 tablets by mouth daily at bedtime    Historical Provider, MD [place] have reviewed home medications using allscripts.    Allergies:   Allergies   Allergen Reactions    Cephalexin Rash    Cephalexin Rash       Social History:     Marital Status: Single   Occupation:   Patient Pre-hospital Living Situation: Resides w/ siblings  Patient Pre-hospital Level of Mobility: bedbound  Patient Pre-hospital Diet Restrictions: none  Substance Use History:   Social History     Substance and Sexual Activity   Alcohol Use Not Currently     Social History     Tobacco Use   Smoking Status Former    Current packs/day: 0.00    Average packs/day: 0.5 packs/day for 31.0 years (15.5 ttl pk-yrs)    Types: Cigarettes    Start date:     Quit date:     Years since quittin.5   Smokeless Tobacco Never     Social History     Substance and Sexual Activity   Drug Use No       Family History:    Family History   Problem Relation Age of Onset    Heart attack Mother     Stroke Mother     Heart attack Father     Anuerysm Father         In Stomach     Aneurysm Father        Physical Exam:     Vitals:   Blood Pressure: 141/63 (24)  Pulse: 87 (24)  Temperature: 98.2 °F (36.8  "°C) (07/26/24 0636)  Temp Source: Temporal (07/26/24 0636)  Respirations: 18 (07/26/24 0636)  Height: 5' 5\" (165.1 cm) (07/26/24 0628)  Weight - Scale: 69.2 kg (152 lb 8.9 oz) (07/26/24 0628)  SpO2: 97 % (07/26/24 0636)    Physical Exam  Cardiovascular:      Rate and Rhythm: Normal rate and regular rhythm.      Pulses: Normal pulses.      Heart sounds: Normal heart sounds.   Pulmonary:      Effort: Pulmonary effort is normal. No respiratory distress.      Breath sounds: Normal breath sounds. No wheezing or rales.   Abdominal:      General: Abdomen is flat. Bowel sounds are normal. There is no distension.      Palpations: Abdomen is soft.      Tenderness: There is no abdominal tenderness. There is no guarding.   Genitourinary:     Comments: + suprapubic catheter in place  Musculoskeletal:      Cervical back: Normal range of motion and neck supple.   Skin:     General: Skin is warm and dry.      Findings: No rash.   Neurological:      Mental Status: He is alert. Mental status is at baseline.      Cranial Nerves: No cranial nerve deficit.      Comments: + chronic symmetric LE weakness           Additional Data:     Lab Results: I have personally reviewed pertinent reports.      Results from last 7 days   Lab Units 07/26/24  0153   WBC Thousand/uL 12.31*   HEMOGLOBIN g/dL 15.6   HEMATOCRIT % 48.6   PLATELETS Thousands/uL 362   SEGS PCT % 64   LYMPHO PCT % 24   MONO PCT % 6   EOS PCT % 5     Results from last 7 days   Lab Units 07/26/24  0339 07/26/24  0237 07/26/24  0153   SODIUM mmol/L  --   --  133*   POTASSIUM mmol/L 4.6   < > 5.7*   CHLORIDE mmol/L  --   --  97   CO2 mmol/L  --   --  27   BUN mg/dL  --   --  21   CREATININE mg/dL  --   --  1.09   ANION GAP mmol/L  --   --  9   CALCIUM mg/dL  --   --  10.2   ALBUMIN g/dL  --   --  4.1   TOTAL BILIRUBIN mg/dL  --   --  0.35   ALK PHOS U/L  --   --  96   ALT U/L  --   --  15   AST U/L  --   --  27   GLUCOSE RANDOM mg/dL  --   --  231*    < > = values in this interval " not displayed.     Results from last 7 days   Lab Units 07/26/24  0153   INR  0.88             Results from last 7 days   Lab Units 07/26/24  0153 07/20/24  1731   LACTIC ACID mmol/L 1.0 1.8   PROCALCITONIN ng/ml  --  <0.05       Imaging: I have personally reviewed pertinent reports.      CTA head and neck with and without contrast   Final Result by Joshua Rice MD (07/26 0410)      CT Brain:  No acute intracranial abnormality. 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 Stable mild to moderate focal stenosis at the left inferior M2 division and right P2 segment. .                  Workstation performed: [id number]             EKG, Pathology, and Other Studies Reviewed on Admission:   EKG: NSR @ 78 bpm, nml axis    Allscripts / Epic Records Reviewed: Yes     ** Please Note: This note has been constructed using a voice recognition system. **

## 2024-07-26 NOTE — PLAN OF CARE
Problem: Prexisting or High Potential for Compromised Skin Integrity  Goal: Skin integrity is maintained or improved  Description: INTERVENTIONS:  - Identify patients at risk for skin breakdown  - Assess and monitor skin integrity  - Assess and monitor nutrition and hydration status  - Monitor labs   - Assess for incontinence   - Turn and reposition patient  - Assist with mobility/ambulation  - Relieve pressure over bony prominences  - Avoid friction and shearing  - Provide appropriate hygiene as needed including keeping skin clean and dry  - Evaluate need for skin moisturizer/barrier cream  - Collaborate with interdisciplinary team   - Patient/family teaching  - Consider wound care consult   Outcome: Progressing     Problem: PAIN - ADULT  Goal: Verbalizes/displays adequate comfort level or baseline comfort level  Description: Interventions:  - Encourage patient to monitor pain and request assistance  - Assess pain using appropriate pain scale  - Administer analgesics based on type and severity of pain and evaluate response  - Implement non-pharmacological measures as appropriate and evaluate response  - Consider cultural and social influences on pain and pain management  - Notify physician/advanced practitioner if interventions unsuccessful or patient reports new pain  Outcome: Progressing     Problem: INFECTION - ADULT  Goal: Absence or prevention of progression during hospitalization  Description: INTERVENTIONS:  - Assess and monitor for signs and symptoms of infection  - Monitor lab/diagnostic results  - Monitor all insertion sites, i.e. indwelling lines, tubes, and drains  - Monitor endotracheal if appropriate and nasal secretions for changes in amount and color  - [place] appropriate cooling/warming therapies per order  - Administer medications as ordered  - Instruct and encourage patient and family to use good hand hygiene technique  - Identify and instruct in appropriate isolation precautions for  identified infection/condition  Outcome: Progressing     Problem: SAFETY ADULT  Goal: Patient will remain free of falls  Description: INTERVENTIONS:  - Educate patient/family on patient safety including physical limitations  - Instruct patient to call for assistance with activity   - Consult OT/PT to assist with strengthening/mobility   - Keep Call bell within reach  - Keep bed low and locked with side rails adjusted as appropriate  - Keep care items and personal belongings within reach  - Initiate and maintain comfort rounds  - Make Fall Risk Sign visible to staff  - Offer Toileting every 2 Hours, in advance of need  - Initiate/Maintain bed alarm  - Obtain necessary fall risk management equipment: call bell, bed alarm,  socks  - Apply yellow socks and bracelet for high fall risk patients  - Consider moving patient to room near nurses station  Outcome: Progressing     Problem: SAFETY ADULT  Goal: Maintain or return to baseline ADL function  Description: INTERVENTIONS:  -  Assess patient's ability to carry out ADLs; assess patient's baseline for ADL function and identify physical deficits which impact ability to perform ADLs (bathing, care of mouth/teeth, toileting, grooming, dressing, etc.)  - Assess/evaluate cause of self-care deficits   - Assess range of motion  - Assess patient's mobility; develop plan if impaired  - Assess patient's need for assistive devices and provide as appropriate  - Encourage maximum independence but intervene and supervise when necessary  - Involve family in performance of ADLs  - Assess for home care needs following discharge   - Consider OT consult to assist with ADL evaluation and planning for discharge  - Provide patient education as appropriate  Outcome: Progressing

## 2024-07-26 NOTE — ASSESSMENT & PLAN NOTE
S/p CTH, CTA  Reports of vertiginous symptoms  Possibly secondary to vertigo vs acute cystitis  Trial of meclizine and abx txt. If no improvement consider MRI given hx of CVA  S/p CTA revealing L internal carotid artery stenosis 50-69%; referral to ambulatory vascular sx eval  Neg troponin

## 2024-07-26 NOTE — ASSESSMENT & PLAN NOTE
Unclear etiology  Pt reports he was txt with unknown abx as outpt  No evidence of rash or anaphylaxis  Had symptoms prior to receiving meclizine thus doubt etiology. ? If due to contrast but no prior hx of allergy to this  Trial of prn hydroxyzine   Cont supportive care

## 2024-07-26 NOTE — ASSESSMENT & PLAN NOTE
Does NOT APPEAR VISUALLY SIGNIFICANT. Possibly secondary to presence of chronic indwelling supra pubic catheter  Catheter changed per visiting RN prior to presentation  Most recent ur cx  revealing staph aureus (7/20/24); pt reporting was treated as outpt with unknown abx  Pending UA  Will empirically place on bactrim  + leukocytosis but is chronic. Pt afebrile  F/u UA, Ur cx

## 2024-07-26 NOTE — CASE MANAGEMENT
Case Management Assessment & Discharge Planning Note    Patient name Mathew Rico  Location East 2 /E2 -* MRN [id number]  : 1950 Date 2024       Current Admission Date: 2024  Current Admission Diagnosis:Dizziness   Patient Active Problem List    Diagnosis Date Noted Date Diagnosed    Dizziness 2024     Pruritus 2024     Acute cystitis without hematuria 2024     Blurred vision, bilateral 2024     Symptoms of upper respiratory infection (URI) 06/15/2024     Chest pain 2024     Acute encephalopathy 2024     Leukocytosis 2024     GERSON on CPAP 2024     Fall 2024     Primary hypertension 2024     Type 2 diabetes mellitus without complication, without long-term current use of insulin (HCC) 2024     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HCC) 2024     Multiple sclerosis (HCC) 2024     Urinary retention 2024     Neck pain 2024     Vitamin B deficiency 2024     Generalized weakness 2024     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     Fall 2023     Weakness 2023     Accidental fall from chair 2023     Constipation 2023     Chronic diastolic congestive heart failure (HCC) 2023     Hyponatremia 2023     Excessive daytime sleepiness 2022     Shortness of breath 2022     Pancreatic mass 2020     Pancreatic lesion 2020     Bladder stones 2019     Bladder neck contracture 2019     History of CVA (cerebrovascular accident) 10/21/2018     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HCC) 2017     Diabetic neuropathy (HCC) 2016     Neurogenic bladder 2016     Thyroid nodule 2015     Cervical spinal stenosis 2014     Generalized anxiety disorder 2014     Obstructive sleep apnea 2013     Benign colon polyp 2012     Esophageal reflux 2012     Fatty liver 2012     Glaucoma 2012     Hyperlipidemia  06/19/2012     Multiple sclerosis (HCC) 06/19/2012     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) 06/19/2012     Primary hypertension 06/11/2012       LOS (days): 0  Geometric Mean LOS (GMLOS) (days):   Days to GMLOS:     OBJECTIVE:        Current admission status: Observation     Preferred Pharmacy:   [facility]/pharmacy #[id number] - [place] - [address]  [address] [address]  Phone: [phone] Fax: [phone]    [place], [place] - [address]  [address] [facility] [address]  Phone: [phone] Fax: [phone]    [facility] [place], [place] - [address]  [facility],  [address]  [facility],  First Floor [facility] [address]  Phone: [phone] Fax: [phone]     [facility] [place], [place] - [address]  [address] [place] [address]  Phone: [phone] Fax: [phone]    Primary Care Provider: Carolina Kumari MD    Primary Insurance: [facility]  Secondary Insurance:     ASSESSMENT:  Active Health Care Proxies       Guzman Rico Health Care Representative - Brother   Primary Phone: [phone] (Home)                 Advance Directives  Does patient have a Health Care POA?: No  Was patient offered paperwork?: Yes (declined)  Does patient currently have a Health Care decision maker?: Yes, please see Health Care Proxy section  Does patient have Advance Directives?: No  Was patient offered paperwork?: Yes (declined)  Primary Contact: Guzman Rico (brother) [phone]     Readmission Root Cause  30 Day Readmission: No    Patient Information  Admitted from:: Home  Mental Status: Alert  During Assessment patient was accompanied by: Not accompanied during assessment  Assessment information provided by:: Patient  Primary Caregiver: Self  Support Systems: Family members  County of Residence: [place]  What [facility] do you live in?: [place]  Type of Current Residence: [facility]  Living Arrangements: Other (Comment),  Lives w/ Extended Family  Is patient a ?: No    Activities of Daily Living Prior to Admission  Functional Status: Assistance  Completes ADLs independently?: No  Level of ADL dependence: Total Dependent  Ambulates independently?: No  Level of ambulatory dependence: Total Dependent  Does patient use assisted devices?: Yes  Assisted Devices (DME) used: Wheelchair, Walker, Shower Chair, Hospital Bed, Bedside Commode  Does patient currently own DME?: Yes  What DME does the patient currently own?: Hospital Bed, Wheelchair, Walker, Shower Chair, Bedside Commode  Does patient have a history of Outpatient Therapy (PT/OT)?: No  Does the patient have a history of Short-Term Rehab?: Yes ([facility])  Does patient have a history of HHC?: Yes ([facility])  Does patient currently have HHC?: No     Patient Information Continued  Income Source: SSI/SSD  Does patient have prescription coverage?: Yes  Does patient receive dialysis treatments?: No  Does patient have a history of substance abuse?: No  Does patient have a history of Mental Health Diagnosis?: No    PHQ 2/9 Screening   Reviewed PHQ 2/9 Depression Screening Score?: No    Means of Transportation  Means of Transport to Appts:: Family transport      Social Determinants of Health (SDOH)      Flowsheet Row Most Recent Value   Housing Stability    In the last 12 months, was there a time when you were not able to pay the mortgage or rent on time? N   In the past 12 months, how many times have you moved where you were living? 0   At any time in the past 12 months, were you homeless or living in a shelter (including now)? N   Transportation Needs    In the past 12 months, has lack of transportation kept you from medical appointments or from getting medications? no   In the past 12 months, has lack of transportation kept you from meetings, work, or from getting things needed for daily living? No   Food Insecurity    Within the past 12 months, you worried that your food would run  out before you got the money to buy more. Never true   Within the past 12 months, the food you bought just didn't last and you didn't have money to get more. Never true   Utilities    In the past 12 months has the electric, gas, oil, or water company threatened to shut off services in your home? No            DISCHARGE DETAILS:    Discharge planning discussed with:: patient  Freedom of Choice: Yes  Comments - Freedom of Choice: Patient wants to be discharged home with [facility]  CM contacted family/caregiver?: No- see comments  Were Treatment Team discharge recommendations reviewed with patient/caregiver?: Yes  Did patient/caregiver verbalize understanding of patient care needs?: Yes  Were patient/caregiver advised of the risks associated with not following Treatment Team discharge recommendations?: Yes    Contacts  Patient Contacts: [facility]  Relationship to Patient:: Treatment Provider  Contact Method: Phone  Phone Number: [phone]  Reason/Outcome: Discharge Planning, Continuity of Care    Requested Home Health Care         Is the patient interested in HHC at discharge?: Yes  Home Health Discipline requested:: Physical Therapy, Occupational Therapy  Home Health Agency Name:: [facility] External Referral Reason (only applicable if external HHA name selected): Patient has established relationship with provider  Home Health Follow-Up Provider:: PCP  Home Health Services Needed:: Strengthening/Theraputic Exercises to Improve Function, Evaluate Functional Status and Safety  Homebound Criteria Met:: Requires the Assistance of Another Person for Safe Ambulation or to Leave the Home, Uses an Assist Device (i.e. cane, walker, etc)  Supporting Clincal Findings:: Bed Bound or Wheelchair Bound    DME Referral Provided  Referral made for DME?: No    Other Referral/Resources/Interventions Provided:  Interventions: [facility]    Would you like to participate in our [facility] service program?  : No -  Declined       Discharge Destination Plan:: Home with Home Health Care ()      Additional Comments: CM met with the patient at the bedside for intake assessment and discharge planning. CM introduced self and reviewed CM process. Patient states he wishes to be discharged home with [facility] and does not want to go to rehab. CM [id number] out to [facility] [phone], Voicemail message left for Leslee Kang, [email or web address] entails the reason for the call and CM contact information for return call. CM department will continue to follow patient through hospital discharge.

## 2024-07-26 NOTE — ED NOTES
Suprapubic dela cruz catheter drainage bag changed at this time.     Renee Camargo RN  07/26/24 0565

## 2024-07-26 NOTE — TREATMENT TEAM
Post-Admit Note-non-billable    -patient was seen and examined at bedside, still having intermittent dizziness denies any vomiting, chest pain or dyspnea  -Patient does not appear to be consistent with a UTI will discontinue Bactrim given hyperkalemia on admission  -Continue meclizine as needed  -Patient follow-up for vascular surgery for left internal carotid artery stenosis

## 2024-07-27 LAB
ANION GAP SERPL CALCULATED.3IONS-SCNC: 7 MMOL/L (ref 4–13)
BUN SERPL-MCNC: 25 MG/DL (ref 5–25)
CALCIUM SERPL-MCNC: 9.7 MG/DL (ref 8.4–10.2)
CHLORIDE SERPL-SCNC: 104 MMOL/L (ref 96–108)
CO2 SERPL-SCNC: 25 MMOL/L (ref 21–32)
CREAT SERPL-MCNC: 1.09 MG/DL (ref 0.6–1.3)
ERYTHROCYTE [DISTWIDTH] IN BLOOD BY AUTOMATED COUNT: 13.3 % (ref 11.6–15.1)
GFR SERPL CREATININE-BSD FRML MDRD: 66 ML/MIN/1.73SQ M
GLUCOSE SERPL-MCNC: 213 MG/DL (ref 65–140)
HCT VFR BLD AUTO: 42.6 % (ref 36.5–49.3)
HGB BLD-MCNC: 14 G/DL (ref 12–17)
MCH RBC QN AUTO: 27.7 PG (ref 26.8–34.3)
MCHC RBC AUTO-ENTMCNC: 32.9 G/DL (ref 31.4–37.4)
MCV RBC AUTO: 84 FL (ref 82–98)
PLATELET # BLD AUTO: 336 THOUSANDS/UL (ref 149–390)
PMV BLD AUTO: 8.4 FL (ref 8.9–12.7)
POTASSIUM SERPL-SCNC: 4.1 MMOL/L (ref 3.5–5.3)
RBC # BLD AUTO: 5.06 MILLION/UL (ref 3.88–5.62)
SODIUM SERPL-SCNC: 136 MMOL/L (ref 135–147)
WBC # BLD AUTO: 10.17 THOUSAND/UL (ref 4.31–10.16)

## 2024-07-27 PROCEDURE — 80048 BASIC METABOLIC PNL TOTAL CA: CPT | Performed by: INTERNAL MEDICINE

## 2024-07-27 PROCEDURE — 99232 SBSQ HOSP IP/OBS MODERATE 35: CPT | Performed by: INTERNAL MEDICINE

## 2024-07-27 PROCEDURE — 85027 COMPLETE CBC AUTOMATED: CPT | Performed by: INTERNAL MEDICINE

## 2024-07-27 RX ORDER — NITROFURANTOIN 25; 75 MG/1; MG/1
100 CAPSULE ORAL 2 TIMES DAILY WITH MEALS
Status: DISCONTINUED | OUTPATIENT
Start: 2024-07-27 | End: 2024-07-28 | Stop reason: HOSPADM

## 2024-07-27 RX ADMIN — MIRTAZAPINE 7.5 MG: 7.5 TABLET, FILM COATED ORAL at 22:15

## 2024-07-27 RX ADMIN — BACLOFEN 5 MG: 10 TABLET ORAL at 08:04

## 2024-07-27 RX ADMIN — AZELASTINE 2 SPRAY: 1 SPRAY, METERED NASAL at 20:44

## 2024-07-27 RX ADMIN — ACETAMINOPHEN 650 MG: 325 TABLET, FILM COATED ORAL at 20:42

## 2024-07-27 RX ADMIN — AZELASTINE 2 SPRAY: 1 SPRAY, METERED NASAL at 08:05

## 2024-07-27 RX ADMIN — BUDESONIDE AND FORMOTEROL FUMARATE DIHYDRATE 2 PUFF: 160; 4.5 AEROSOL RESPIRATORY (INHALATION) at 08:05

## 2024-07-27 RX ADMIN — SENNOSIDES AND DOCUSATE SODIUM 2 TABLET: 50; 8.6 TABLET ORAL at 22:15

## 2024-07-27 RX ADMIN — PROPRANOLOL HYDROCHLORIDE 60 MG: 60 CAPSULE, EXTENDED RELEASE ORAL at 08:05

## 2024-07-27 RX ADMIN — GABAPENTIN 600 MG: 300 CAPSULE ORAL at 22:15

## 2024-07-27 RX ADMIN — BACLOFEN 5 MG: 10 TABLET ORAL at 15:39

## 2024-07-27 RX ADMIN — AMLODIPINE BESYLATE 10 MG: 10 TABLET ORAL at 08:04

## 2024-07-27 RX ADMIN — HYDROXYZINE HYDROCHLORIDE 10 MG: 10 TABLET ORAL at 22:15

## 2024-07-27 RX ADMIN — CYANOCOBALAMIN TAB 500 MCG 500 MCG: 500 TAB at 08:04

## 2024-07-27 RX ADMIN — BUDESONIDE AND FORMOTEROL FUMARATE DIHYDRATE 2 PUFF: 160; 4.5 AEROSOL RESPIRATORY (INHALATION) at 16:36

## 2024-07-27 RX ADMIN — CLOPIDOGREL BISULFATE 75 MG: 75 TABLET ORAL at 08:04

## 2024-07-27 RX ADMIN — GABAPENTIN 300 MG: 300 CAPSULE ORAL at 08:04

## 2024-07-27 RX ADMIN — ENOXAPARIN SODIUM 40 MG: 40 INJECTION SUBCUTANEOUS at 08:04

## 2024-07-27 RX ADMIN — PANTOPRAZOLE SODIUM 40 MG: 40 TABLET, DELAYED RELEASE ORAL at 05:22

## 2024-07-27 RX ADMIN — MECLIZINE 12.5 MG: 12.5 TABLET ORAL at 22:15

## 2024-07-27 RX ADMIN — GABAPENTIN 300 MG: 300 CAPSULE ORAL at 16:36

## 2024-07-27 RX ADMIN — MECLIZINE 12.5 MG: 12.5 TABLET ORAL at 10:32

## 2024-07-27 RX ADMIN — LATANOPROST 1 DROP: 50 SOLUTION/ DROPS OPHTHALMIC at 22:59

## 2024-07-27 RX ADMIN — BACLOFEN 5 MG: 10 TABLET ORAL at 20:42

## 2024-07-27 RX ADMIN — ATORVASTATIN CALCIUM 80 MG: 80 TABLET, FILM COATED ORAL at 15:39

## 2024-07-27 RX ADMIN — NITROFURANTOIN MONOHYDRATE/MACROCRYSTALS 100 MG: 75; 25 CAPSULE ORAL at 15:39

## 2024-07-27 NOTE — CASE MANAGEMENT
Case Management Discharge Planning Note    Patient name Mathew Rico  Location East 2 /E2 -* MRN [id number]  : 1950 Date 2024       Current Admission Date: 2024  Current Admission Diagnosis:Dizziness   Patient Active Problem List    Diagnosis Date Noted Date Diagnosed    Dizziness 2024     Pruritus 2024     Acute cystitis without hematuria 2024     Blurred vision, bilateral 2024     Symptoms of upper respiratory infection (URI) 06/15/2024     Chest pain 2024     Acute encephalopathy 2024     Leukocytosis 2024     GERSON on CPAP 2024     Fall 2024     Primary hypertension 2024     Type 2 diabetes mellitus without complication, without long-term current use of insulin (HCC) 2024     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HCC) 2024     Multiple sclerosis (HCC) 2024     Urinary retention 2024     Neck pain 2024     Vitamin B deficiency 2024     Generalized weakness 2024     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     Fall 2023     Weakness 2023     Accidental fall from chair 2023     Constipation 2023     Chronic diastolic congestive heart failure (HCC) 2023     Hyponatremia 2023     Excessive daytime sleepiness 2022     Shortness of breath 2022     Pancreatic mass 2020     Pancreatic lesion 2020     Bladder stones 2019     Bladder neck contracture 2019     History of CVA (cerebrovascular accident) 10/21/2018     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HCC) 2017     Diabetic neuropathy (HCC) 2016     Neurogenic bladder 2016     Thyroid nodule 2015     Cervical spinal stenosis 2014     Generalized anxiety disorder 2014     Obstructive sleep apnea 2013     Benign colon polyp 2012     Esophageal reflux 2012     Fatty liver 2012     Glaucoma 2012     Hyperlipidemia 2012     Multiple  sclerosis (HCC) 06/19/2012     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) 06/19/2012     Primary hypertension 06/11/2012       LOS (days): 0  Geometric Mean LOS (GMLOS) (days):   Days to GMLOS:     OBJECTIVE:            Current admission status: Observation   Preferred Pharmacy:   [facility]/pharmacy #[id number] - [place] - [address]  [address] [address]  Phone: [phone] Fax: [phone]    [place], [place] - [address]  [address] [facility] [address]  Phone: [phone] Fax: [phone]    [facility] [place] - [address]  [facility],  [address]  [facility],  First [address] [facility] [address]  Phone: [phone] Fax: [phone]     [facility] [place], [place] - [address]  [address] [place] [address]  Phone: [phone] Fax: [phone]    Primary Care Provider: Carolina Kumari MD    Primary Insurance: [facility]  Secondary Insurance:     DISCHARGE DETAILS:                                Requested Home Health Care         Is the patient interested in HHC at discharge?: Yes  Home Health Discipline requested:: Occupational Therapy, Physical Therapy  Home Health Agency Name:: Other ([facility] provides)  HHA External Referral Reason (only applicable if external HHA name selected): Patient has established relationship with provider  Home Health Follow-Up Provider:: PCP  Home Health Services Needed:: Evaluate Functional Status and Safety, Strengthening/Theraputic Exercises to Improve Function  Homebound Criteria Met:: Requires the Assistance of Another Person for Safe Ambulation or to Leave the Home, Uses an Assist Device (i.e. cane, walker, etc)  Supporting Clincal Findings:: Bed Bound or Wheelchair Bound

## 2024-07-27 NOTE — PLAN OF CARE
Problem: Prexisting or High Potential for Compromised Skin Integrity  Goal: Skin integrity is maintained or improved  Description: INTERVENTIONS:  - Identify patients at risk for skin breakdown  - Assess and monitor skin integrity  - Assess and monitor nutrition and hydration status  - Monitor labs   - Assess for incontinence   - Turn and reposition patient  - Assist with mobility/ambulation  - Relieve pressure over bony prominences  - Avoid friction and shearing  - Provide appropriate hygiene as needed including keeping skin clean and dry  - Evaluate need for skin moisturizer/barrier cream  - Collaborate with interdisciplinary team   - Patient/family teaching  - Consider wound care consult   Outcome: Progressing     Problem: PAIN - ADULT  Goal: Verbalizes/displays adequate comfort level or baseline comfort level  Description: Interventions:  - Encourage patient to monitor pain and request assistance  - Assess pain using appropriate pain scale  - Administer analgesics based on type and severity of pain and evaluate response  - Implement non-pharmacological measures as appropriate and evaluate response  - Consider cultural and social influences on pain and pain management  - Notify physician/advanced practitioner if interventions unsuccessful or patient reports new pain  Outcome: Progressing     Problem: INFECTION - ADULT  Goal: Absence or prevention of progression during hospitalization  Description: INTERVENTIONS:  - Assess and monitor for signs and symptoms of infection  - Monitor lab/diagnostic results  - Monitor all insertion sites, i.e. indwelling lines, tubes, and drains  - Monitor endotracheal if appropriate and nasal secretions for changes in amount and color  - [place] appropriate cooling/warming therapies per order  - Administer medications as ordered  - Instruct and encourage patient and family to use good hand hygiene technique  - Identify and instruct in appropriate isolation precautions for  identified infection/condition  Outcome: Progressing     Problem: SAFETY ADULT  Goal: Patient will remain free of falls  Description: INTERVENTIONS:  - Educate patient/family on patient safety including physical limitations  - Instruct patient to call for assistance with activity   - Consult OT/PT to assist with strengthening/mobility   - Keep Call bell within reach  - Keep bed low and locked with side rails adjusted as appropriate  - Keep care items and personal belongings within reach  - Initiate and maintain comfort rounds  - Make Fall Risk Sign visible to staff  - Apply yellow socks and bracelet for high fall risk patients  - Consider moving patient to room near nurses station  Outcome: Progressing  Goal: Maintain or return to baseline ADL function  Description: INTERVENTIONS:  -  Assess patient's ability to carry out ADLs; assess patient's baseline for ADL function and identify physical deficits which impact ability to perform ADLs (bathing, care of mouth/teeth, toileting, grooming, dressing, etc.)  - Assess/evaluate cause of self-care deficits   - Assess range of motion  - Assess patient's mobility; develop plan if impaired  - Assess patient's need for assistive devices and provide as appropriate  - Encourage maximum independence but intervene and supervise when necessary  - Involve family in performance of ADLs  - Assess for home care needs following discharge   - Consider OT consult to assist with ADL evaluation and planning for discharge  - Provide patient education as appropriate  Outcome: Progressing  Goal: Maintains/Returns to pre admission functional level  Description: INTERVENTIONS:  - Perform AM-PAC 6 Click Basic Mobility/ Daily Activity assessment daily.  - Set and communicate daily mobility goal to care team and patient/family/caregiver.   - Collaborate with rehabilitation services on mobility goals if consulted  - Out of bed for toileting  - Record patient progress and toleration of activity level    Outcome: Progressing     Problem: DISCHARGE PLANNING  Goal: Discharge to home or other facility with appropriate resources  Description: INTERVENTIONS:  - Identify barriers to discharge w/patient and caregiver  - Arrange for needed discharge resources and transportation as appropriate  - Identify discharge learning needs (meds, wound care, etc.)  - Arrange for interpretive services to assist at discharge as needed  - Refer to Case Management Department for coordinating discharge planning if the patient needs post-hospital services based on physician/advanced practitioner order or complex needs related to functional status, cognitive ability, or social support system  Outcome: Progressing     Problem: Knowledge Deficit  Goal: Patient/family/caregiver demonstrates understanding of disease process, treatment plan, medications, and discharge instructions  Description: Complete learning assessment and assess knowledge base.  Interventions:  - Provide teaching at level of understanding  - Provide teaching via preferred learning methods  Outcome: Progressing

## 2024-07-27 NOTE — UTILIZATION REVIEW
Initial Clinical Review  Observation on 07/26 @ 0505 upgraded to Inpatient on 07/27 @ 1634. Pt requiring continued stay d/t continued management of UTI and dizziness.    Admission: Date/Time/Statement:   Admission Orders (From admission, onward)       Ordered        07/27/24 1634  INPATIENT ADMISSION  Once            07/26/24 0505  Place in Observation  Once                          Orders Placed This Encounter   Procedures    INPATIENT ADMISSION     Standing Status:   Standing     Number of Occurrences:   1     Order Specific Question:   Level of Care     Answer:   Med Surg [16]     Order Specific Question:   Estimated length of stay     Answer:   More than 2 Midnights     Order Specific Question:   Certification     Answer:   I certify that inpatient services are medically necessary for this patient for a duration of greater than two midnights. See H&P and MD Progress Notes for additional information about the patient's course of treatment.     ED Arrival Information       Expected   -    Arrival   7/26/2024 01:11    Acuity   Urgent              Means of arrival   Ambulance    Escorted by   [facility] ([facility])    Service   Hospitalist    Admission type   Emergency              Arrival complaint   Dizziness             Chief Complaint   Patient presents with    Dizziness     Pt BIBA from home with dizziness since yesterday, pt is bedbound. Pt complains of nausea, CP starting this morning that feels like he has a sticker. Pt has RS head pain. AAOx4. Has visiting nurse, brother and sister live with pt at home and help provide care.       Initial Presentation: 73 y.o. male w/ PMH of MS, bedbound, neurogenic bladder w/ SPC in place, h/o CVA, HTN presented to the ED from home via EMS w/ dizziness x 1 day.  Reports sensation of room spinning. He was in the ED on 07/20 d/t abd pain, CT was done suggestive of cystitis, asymptomatic, low procal, CT w/ constipation, dc'd home w/ bowel regimen.   In the ED, CTA  revealing L internal carotid artery stenosis 50-69%; referral to ambulatory vascular sx eval. Neg trops. UA pending. C/o pruritus. + leukocytosis, chronic.  On exam,  + chronic symmetric LE weakness,  + suprapubic catheter in place. Given po meclizine, IV pepcid..     Admit as observation level of care for dizziness, acute cystitis, pruritus.  Plan: f/u UA and Ucx. Bactrim. Trial of prn hydroxyzine.     Anticipated Length of Stay/Certification Statement: Patient will be admitted on an Observation basis with an anticipated length of stay of  < 2 midnights.   Justification for Hospital Stay: Dizziness, acute cystitis     Date: 07/27   Day 2          Date: 07/28  Day 3: Has surpassed a 2nd midnight with active treatments and services.            ED Triage Vitals [07/26/24 0127]   Temperature Pulse Respirations Blood Pressure SpO2 Pain Score   98.5 °F (36.9 °C) 79 20 122/56 95 % 5     Weight (last 2 days)       Date/Time Weight    07/26/24 0628 69.2 (152.56)    07/26/24 0127 71.1 (156.75)            Vital Signs (last 3 days)       Date/Time Temp Pulse Resp BP MAP (mmHg) SpO2 O2 Device Patient Position - Orthostatic VS Annamarie Coma Scale Score Pain    07/27/24 1547 98.1 °F (36.7 °C) 73 18 131/62 69 95 % None (Room air) Lying -- --    07/27/24 0830 -- -- -- -- -- -- -- -- 15 No Pain    07/27/24 0733 97.2 °F (36.2 °C) 69 16 112/62 67 94 % None (Room air) Lying -- --    07/26/24 2101 98.5 °F (36.9 °C) 73 17 108/55 69 93 % None (Room air) Lying -- No Pain    07/26/24 1643 98.1 °F (36.7 °C) 84 18 141/59 75 95 % None (Room air) Lying -- --    07/26/24 1048 -- 88 -- 145/69 -- -- -- -- -- --    07/26/24 0900 -- -- -- -- -- -- -- -- -- No Pain    07/26/24 0759 97.8 °F (36.6 °C) 88 16 145/69 94 98 % None (Room air) Lying -- --    07/26/24 0636 98.2 °F (36.8 °C) 87 18 141/63 91 97 % None (Room air) Lying 15 --    07/26/24 0628 -- -- -- -- -- -- -- -- -- No Pain    07/26/24 0530 -- 82 18 165/71 102 96 % None (Room air) -- -- --     07/26/24 0430 -- 76 16 113/56 81 94 % None (Room air) -- -- --    07/26/24 0400 -- 80 20 150/56 97 97 % None (Room air) Lying -- --    07/26/24 0300 -- 79 19 157/70 101 95 % None (Room air) -- -- --    07/26/24 0230 -- 74 14 142/63 91 95 % None (Room air) Lying -- --    07/26/24 0131 -- -- -- -- -- -- -- -- 15 --    07/26/24 0127 98.5 °F (36.9 °C) 79 20 122/56 -- 95 % None (Room air) -- -- 5              Pertinent Labs/Diagnostic Test Results:   Radiology:  CTA head and neck with and without contrast   Final Interpretation by Joshua Rice MD (07/26 0410)      CT Brain:  No acute intracranial abnormality. 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 Stable mild to moderate focal stenosis at the left inferior M2 division and right P2 segment. .                  Workstation performed: [id number]           Cardiology:  ECG 12 lead   Final Result by Elver Christine DO (07/26 0951)   Normal sinus rhythm   Increased R/S ratio in V1, consider early transition or posterior infarct   Abnormal ECG   When compared with ECG of 26-JUL-2024 01:24, (unconfirmed)   No significant change was found   Confirmed by Elver Christine ([id number]) on 7/26/2024 9:51:50 AM      ECG 12 lead   Final Result by Elver Christine DO (07/26 0949)   Normal sinus rhythm   Increased R/S ratio in V1, consider early transition or posterior infarct   Abnormal ECG   When compared with ECG of 20-JUL-2024 19:19,   No significant change was found   Confirmed by Elver Christine ([id number]) on 7/26/2024 9:49:17 AM        GI:  No orders to display           Results from last 7 days   Lab Units 07/27/24  0604 07/26/24  0153 07/20/24  1731   WBC Thousand/uL 10.17* 12.31* 11.50*   HEMOGLOBIN g/dL 14.0 15.6 14.8   HEMATOCRIT % 42.6 48.6 45.2   PLATELETS Thousands/uL 336 362 354   TOTAL NEUT ABS Thousands/µL  --  7.90* 6.45         Results from last 7 days   Lab Units  "07/27/24  0604 07/26/24  0339 07/26/24  0237 07/26/24  0153 07/20/24  1731   SODIUM mmol/L 136  --   --  133* 134*   POTASSIUM mmol/L 4.1 4.6 6.0* 5.7* 4.1   CHLORIDE mmol/L 104  --   --  97 100   CO2 mmol/L 25  --   --  27 27   ANION GAP mmol/L 7  --   --  9 7   BUN mg/dL 25  --   --  21 21   CREATININE mg/dL 1.09  --   --  1.09 0.96   EGFR ml/min/1.73sq m 66  --   --  66 77   CALCIUM mg/dL 9.7  --   --  10.2 9.9   MAGNESIUM mg/dL  --   --   --  2.2 2.0     Results from last 7 days   Lab Units 07/26/24  0153 07/20/24  1731   AST U/L 27 11*   ALT U/L 15 10   ALK PHOS U/L 96 100   TOTAL PROTEIN g/dL 8.8* 7.6   ALBUMIN g/dL 4.1 3.9   TOTAL BILIRUBIN mg/dL 0.35 0.31   BILIRUBIN DIRECT mg/dL  --  0.08         Results from last 7 days   Lab Units 07/27/24  0604 07/26/24  0153 07/20/24  1731   GLUCOSE RANDOM mg/dL 213* 231* 287*             No results found for: \"BETA-HYDROXYBUTYRATE\"                   Results from last 7 days   Lab Units 07/26/24  0358 07/26/24  0153 07/20/24  1922 07/20/24  1731   HS TNI 0HR ng/L  --  4  --  5   HS TNI 2HR ng/L 6  --  5  --    HSTNI D2 ng/L 2  --  0  --          Results from last 7 days   Lab Units 07/26/24  0153   PROTIME seconds 12.2   INR  0.88   PTT seconds 28     Results from last 7 days   Lab Units 07/26/24  0153   TSH 3RD GENERATON uIU/mL 4.530*     Results from last 7 days   Lab Units 07/20/24  1731   PROCALCITONIN ng/ml <0.05     Results from last 7 days   Lab Units 07/26/24  0153 07/20/24  1731   LACTIC ACID mmol/L 1.0 1.8                                 Results from last 7 days   Lab Units 07/20/24  1731   LIPASE u/L 29                 Results from last 7 days   Lab Units 07/26/24  0556 07/20/24  1735   CLARITY UA  Clear Extra Turbid   COLOR UA  Colorless Brown   SPEC GRAV UA  1.046* 1.019   PH UA  5.5 5.5   GLUCOSE UA mg/dl >=1000 (1%)* >=1000 (1%)*   KETONES UA mg/dl Negative Negative   BLOOD UA  Negative Large*   PROTEIN UA mg/dl Negative 30 (1+)*   NITRITE UA  Negative " Positive*   BILIRUBIN UA  Negative Negative   UROBILINOGEN UA (BE) mg/dl <2.0 <2.0   LEUKOCYTES UA  Moderate* Large*   WBC UA /hpf 30-50* Innumerable*   RBC UA /hpf 30-50* Innumerable*   BACTERIA UA /hpf Occasional Occasional   EPITHELIAL CELLS WET PREP /hpf None Seen None Seen                                 Results from last 7 days   Lab Units 07/26/24  0556 07/20/24  1735   URINE CULTURE  >100,000 cfu/ml Staphylococcus aureus*  20,000-29,000 cfu/ml Candida tropicalis* >100,000 cfu/ml Staphylococcus aureus*                   ED Treatment-Medication Administration from 07/26/2024 0111 to 07/26/2024 0624         Date/Time Order Dose Route Action     07/26/2024 0327 iohexol (OMNIPAQUE) 350 MG/ML injection (MULTI-DOSE) 85 mL 85 mL Intravenous Given     07/26/2024 0449 meclizine (ANTIVERT) tablet 25 mg 25 mg Oral Given     07/26/2024 0445 Famotidine (PF) (PEPCID) injection 20 mg 20 mg Intravenous Given            Past Medical History:   Diagnosis Date    Acute laryngitis     Acute nonsuppurative otitis media, unspecified laterality     Arm weakness     Arthritis     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HCC)     Bladder infection     Bronchitis     Constipation     Cough     Diabetes (HCC)     Diabetes mellitus (HCC)     Dizziness     Dysfunction of eustachian tube     Erectile dysfunction of non-organic origin     Fatigue     Glaucoma     Hiatal hernia     Hypertension     Imbalance     Leg muscle spasm     MS (multiple sclerosis) (HCC)     Multiple sclerosis (HCC)     Nephrolithiasis     Neurogenic bladder     No natural teeth     Sinus pain     Spinal stenosis     Strain of thoracic region     Stroke ([facility])     Suprapubic catheter ([facility])      Present on Admission:   Primary hypertension   Multiple sclerosis (HCC)   Type 2 diabetes mellitus with neuropathy ([facility])   Chronic diastolic congestive heart failure ([facility])      Admitting Diagnosis: Dizziness [R42]  Age/Sex: 73 y.o. male  Admission Orders:  Scheduled Medications:  amLODIPine,  10 mg, Oral, Daily   And  atorvastatin, 80 mg, Oral, Daily With Dinner  azelastine, 2 spray, Each Nare, Q12H CIERA  baclofen, 5 mg, Oral, TID  budesonide-formoterol, 2 puff, Inhalation, BID  clopidogrel, 75 mg, Oral, Daily  cyanocobalamin, 500 mcg, Oral, Daily  enoxaparin, 40 mg, Subcutaneous, Daily  gabapentin, 300 mg, Oral, BID  gabapentin, 600 mg, Oral, HS  latanoprost, 1 drop, Both Eyes, HS  lidocaine, 1 patch, Topical, Daily  mirtazapine, 7.5 mg, Oral, HS  nitrofurantoin, 100 mg, Oral, BID With Meals  pantoprazole, 40 mg, Oral, Early Morning  polyethylene glycol, 17 g, Oral, BID  propranolol, 60 mg, Oral, Daily  senna-docusate sodium, 2 tablet, Oral, HS      Continuous IV Infusions:     PRN Meds:  acetaminophen, 650 mg, Oral, Q6H PRN  albuterol, 2.5 mg, Nebulization, Q6H PRN  bisacodyl, 10 mg, Rectal, Daily PRN  hydrOXYzine HCL, 10 mg, Oral, Q6H PRN  meclizine, 12.5 mg, Oral, Q8H PRN  melatonin, 3 mg, Oral, HS PRN  ondansetron, 4 mg, Intravenous, Q6H PRN        None    Network Utilization Review Department  ATTENTION: Please call with any questions or concerns to [phone] and carefully listen to the prompts so that you are directed to the right person. All voicemails are confidential.   For Discharge needs, contact Care Management DC Support Team at [phone] opt. [phone]  Send all requests for admission clinical reviews, approved or denied determinations and any other requests to dedicated fax number below belonging to the campus where the patient is receiving treatment. List of dedicated fax numbers for the Facilities:  FACILITY NAME UR FAX NUMBER   ADMISSION DENIALS (Administrative/Medical Necessity) [phone]   DISCHARGE SUPPORT TEAM (NETWORK) [phone]   PARENT CHILD HEALTH (Maternity/NICU/Pediatrics) [phone]   [facility] [phone]   [facility] [phone]   [facility] [phone]   [facility] [phone]   [facility] [phone]   [facility] [phone]   [facility] [phone]   [facility] [phone]   [facility] [phone]   [facility] [phone]   [facility] [phone]   [facility] [phone]

## 2024-07-27 NOTE — ASSESSMENT & PLAN NOTE
Possibly secondary to presence of chronic indwelling supra pubic catheter  Catheter changed per visiting RN prior to presentation  Urine culture reveals Staph aureus will follow-up sensitivities  Continue Macrobid for now

## 2024-07-27 NOTE — ASSESSMENT & PLAN NOTE
S/p CTH, CTA  Reports of vertiginous symptoms  Possibly secondary to vertigo vs acute cystitis  Improvement with meclizine, continue as needed  S/p CTA revealing L internal carotid artery stenosis 50-69%; referral to ambulatory vascular sx eval  Neg troponin

## 2024-07-27 NOTE — ASSESSMENT & PLAN NOTE
Wt Readings from Last 3 Encounters:   07/26/24 69.2 kg (152 lb 8.9 oz)   07/20/24 81.3 kg (179 lb 3.7 oz)   06/27/24 70 kg (154 lb 5.2 oz)   Clinically compensated  Cont to monitor fluid status

## 2024-07-27 NOTE — PROGRESS NOTES
"[facility]  Progress Note  Name: Mathew Rico I  MRN: [id number]  Unit/Bed#: E2 -01 I Date of Admission: 7/26/2024   Date of Service: 7/27/2024 I Hospital Day: 0    Assessment & Plan   * Dizziness  Assessment & Plan  S/p CTH, CTA  Reports of vertiginous symptoms  Possibly secondary to vertigo vs acute cystitis  Improvement with meclizine, continue as needed  S/p CTA revealing L internal carotid artery stenosis 50-69%; referral to ambulatory vascular sx eval  Neg troponin    Acute cystitis without hematuria  Assessment & Plan  Possibly secondary to presence of chronic indwelling supra pubic catheter  Catheter changed per visiting RN prior to presentation  Urine culture reveals Staph aureus will follow-up sensitivities  Continue Macrobid for now    Pruritus  Assessment & Plan  Unclear etiology  No evidence of rash or anaphylaxis  prn hydroxyzine   Cont supportive care    Chronic diastolic congestive heart failure (HCC)  Assessment & Plan  Wt Readings from Last 3 Encounters:   07/26/24 69.2 kg (152 lb 8.9 oz)   07/20/24 81.3 kg (179 lb 3.7 oz)   06/27/24 70 kg (154 lb 5.2 oz)   Clinically compensated  Cont to monitor fluid status      History of CVA (cerebrovascular accident)  Assessment & Plan  CVA prophylaxis with plavix/statin    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)  Assessment & Plan  Lab Results   Component Value Date    HGBA1C 8.0 (H) 06/16/2024       No results for input(s): \"POCGLU\" in the last 72 hours.    Blood Sugar Average: Last 72 hrs:  Hold metformin  Cont ISS    Multiple sclerosis (HCC)  Assessment & Plan  No sign of acute flare  At baseline is bedbound; + neurogenic bladder with SPC  Cont baclofen    Primary hypertension  Assessment & Plan  Cont norvasc, inderal             Mobility:  Basic Mobility Inpatient Raw Score: 9  JH-HLM Goal: 3: Sit at edge of bed  JH-HLM Achieved: 2: Bed activities/Dependent transfer  JH-HLM Goal NOT achieved. Continue with " multidisciplinary rounding and encourage appropriate mobility to improve upon [facility] goals.    VTE Pharmacologic Prophylaxis:   Pharmacologic: lovenox    Patient Centered Rounds: I have performed bedside rounds with nursing staff today    Education and Discussions with Family / Patient: Updated patient's brother    Time Spent for Care:   More than 50% of total time spent on counseling and coordination of care as described above.    Current Length of Stay: 0 day(s)    Current Patient Status: Observation   Certification Statement: The patient will continue to require additional inpatient hospital stay due to dizziness, UTI    Discharge Plan / Estimated Discharge Date: 24h    Code Status: Level 1 - Full Code      Subjective:   Patient seen and examined at bedside, tolerating his diet, denies any abdominal pain, nausea or vomiting.  States he still has little bit of dizziness but overall better.    Objective:     Vitals:   Temp (24hrs), Av °F (36.7 °C), Min:97.2 °F (36.2 °C), Max:98.5 °F (36.9 °C)    Temp:  [97.2 °F (36.2 °C)-98.5 °F (36.9 °C)] 98.1 °F (36.7 °C)  HR:  [69-84] 73  Resp:  [16-18] 18  BP: (108-141)/(55-62) 131/62  SpO2:  [93 %-95 %] 95 %  Body mass index is 25.39 kg/m².     Input and Output Summary (last 24 hours):       Intake/Output Summary (Last 24 hours) at 2024 1633  Last data filed at 2024 1301  Gross per 24 hour   Intake 960 ml   Output 450 ml   Net 510 ml       Physical Exam:    Constitutional: Patient is oriented to self, does intermittently get confused to where he is  HEENT:  Normocephalic, atraumatic  Cardiovascular: Normal S1S2, RRR, No murmurs/rubs/gallops appreciated.  Pulmonary:  Bilateral air entry, No rhonchi/rales/wheezing appreciated  Abdominal: Soft, Bowel sounds present, Non-tender, Non-distended  Extremities:  No cyanosis, clubbing or edema.   Neurological: Awake, alert  Skin:  Warm, dry    Additional Data:     Labs:    Results from last 7 days   Lab Units  07/27/24  0604 07/26/24  0153   WBC Thousand/uL 10.17* 12.31*   HEMOGLOBIN g/dL 14.0 15.6   HEMATOCRIT % 42.6 48.6   PLATELETS Thousands/uL 336 362   SEGS PCT %  --  64   LYMPHO PCT %  --  24   MONO PCT %  --  6   EOS PCT %  --  5     Results from last 7 days   Lab Units 07/27/24  0604 07/26/24  0237 07/26/24  0153   POTASSIUM mmol/L 4.1   < > 5.7*   CHLORIDE mmol/L 104  --  97   CO2 mmol/L 25  --  27   BUN mg/dL 25  --  21   CREATININE mg/dL 1.09  --  1.09   CALCIUM mg/dL 9.7  --  10.2   ALK PHOS U/L  --   --  96   ALT U/L  --   --  15   AST U/L  --   --  27    < > = values in this interval not displayed.     Results from last 7 days   Lab Units 07/26/24  0153   INR  0.88        I Have Reviewed All Lab Data Listed Above.    Invasive Devices       Peripheral Intravenous Line  Duration             Peripheral IV 07/26/24 Distal;Dorsal (posterior);Right Forearm <1 day              Drain  Duration             Suprapubic Catheter 24 Fr. 42 days                    Recent Cultures (last 7 days):     Results from last 7 days   Lab Units 07/26/24  0556 07/20/24  1735   URINE CULTURE  >100,000 cfu/ml Staphylococcus aureus*  20,000-29,000 cfu/ml Candida tropicalis* >100,000 cfu/ml Staphylococcus aureus*       Last 24 Hours Medication List:   Current Facility-Administered Medications   Medication Dose Route Frequency Provider Last Rate    acetaminophen  650 mg Oral Q6H PRN Josselyn Dickerson, DO      albuterol  2.5 mg Nebulization Q6H PRN Josselyn Dickerson, DO      amLODIPine  10 mg Oral Daily Josselyn Dickerson DO      And    atorvastatin  80 mg Oral Daily With Dinner Josselyn Dickerson DO      azelastine  2 spray Each Nare Q12H [facility] Miah Alexander MD      baclofen  5 mg Oral TID Josselyn Dickerson DO      bisacodyl  10 mg Rectal Daily PRN Josselyn Dickerson DO      budesonide-formoterol  2 puff Inhalation BID Josselyn Dickerson DO      clopidogrel  75 mg Oral Daily Josselyn Lemon  Tuan, DO      cyanocobalamin  500 mcg Oral Daily Josselynti Dickerson, DO      enoxaparin  40 mg Subcutaneous Daily Josselyn Xochilt Dickerson, DO      gabapentin  300 mg Oral BID Josselyn Xochilt Dickerson, DO      gabapentin  600 mg Oral HS Josselyn Xochilt Dickerson, DO      hydrOXYzine HCL  10 mg Oral Q6H PRN Josselynprema Dickerson, DO      latanoprost  1 drop Both Eyes HS Josselynprema Dickerson, DO      lidocaine  1 patch Topical Daily Josselynti Dickerson, DO      meclizine  12.5 mg Oral Q8H PRN Josselynprema Dickerson, DO      melatonin  3 mg Oral HS PRN Josselyn Dickerson, DO      mirtazapine  7.5 mg Oral HS Josselynti Dickerson, DO      nitrofurantoin  100 mg Oral BID With Meals Miah Alexander MD      ondansetron  4 mg Intravenous Q6H PRN Josselynprema Dickerson, DO      pantoprazole  40 mg Oral Early Morning Josselynprema Dickerson, DO      polyethylene glycol  17 g Oral BID Josselynti Dickerson, DO      propranolol  60 mg Oral Daily Josselynti Dickerson, DO      senna-docusate sodium  2 tablet Oral HS Josselynti Dickerson, DO          Today, Patient Was Seen By: Miah Alexander MD

## 2024-07-28 VITALS
WEIGHT: 152.56 LBS | RESPIRATION RATE: 18 BRPM | TEMPERATURE: 97.3 F | BODY MASS INDEX: 25.42 KG/M2 | DIASTOLIC BLOOD PRESSURE: 83 MMHG | HEIGHT: 65 IN | HEART RATE: 74 BPM | OXYGEN SATURATION: 98 % | SYSTOLIC BLOOD PRESSURE: 142 MMHG

## 2024-07-28 LAB
ANION GAP SERPL CALCULATED.3IONS-SCNC: 7 MMOL/L (ref 4–13)
BACTERIA UR CULT: ABNORMAL
BACTERIA UR CULT: ABNORMAL
BASOPHILS # BLD AUTO: 0.1 THOUSANDS/ÂΜL (ref 0–0.1)
BASOPHILS NFR BLD AUTO: 1 % (ref 0–1)
BUN SERPL-MCNC: 23 MG/DL (ref 5–25)
CALCIUM SERPL-MCNC: 9.6 MG/DL (ref 8.4–10.2)
CHLORIDE SERPL-SCNC: 103 MMOL/L (ref 96–108)
CO2 SERPL-SCNC: 28 MMOL/L (ref 21–32)
CREAT SERPL-MCNC: 0.99 MG/DL (ref 0.6–1.3)
EOSINOPHIL # BLD AUTO: 0.74 THOUSAND/ÂΜL (ref 0–0.61)
EOSINOPHIL NFR BLD AUTO: 7 % (ref 0–6)
ERYTHROCYTE [DISTWIDTH] IN BLOOD BY AUTOMATED COUNT: 13.3 % (ref 11.6–15.1)
GFR SERPL CREATININE-BSD FRML MDRD: 74 ML/MIN/1.73SQ M
GLUCOSE SERPL-MCNC: 202 MG/DL (ref 65–140)
HCT VFR BLD AUTO: 45.2 % (ref 36.5–49.3)
HGB BLD-MCNC: 14.7 G/DL (ref 12–17)
IMM GRANULOCYTES # BLD AUTO: 0.03 THOUSAND/UL (ref 0–0.2)
IMM GRANULOCYTES NFR BLD AUTO: 0 % (ref 0–2)
LYMPHOCYTES # BLD AUTO: 3.9 THOUSANDS/ÂΜL (ref 0.6–4.47)
LYMPHOCYTES NFR BLD AUTO: 38 % (ref 14–44)
MCH RBC QN AUTO: 28 PG (ref 26.8–34.3)
MCHC RBC AUTO-ENTMCNC: 32.5 G/DL (ref 31.4–37.4)
MCV RBC AUTO: 86 FL (ref 82–98)
MONOCYTES # BLD AUTO: 0.79 THOUSAND/ÂΜL (ref 0.17–1.22)
MONOCYTES NFR BLD AUTO: 8 % (ref 4–12)
NEUTROPHILS # BLD AUTO: 4.81 THOUSANDS/ÂΜL (ref 1.85–7.62)
NEUTS SEG NFR BLD AUTO: 46 % (ref 43–75)
NRBC BLD AUTO-RTO: 0 /100 WBCS
PLATELET # BLD AUTO: 319 THOUSANDS/UL (ref 149–390)
PMV BLD AUTO: 8.4 FL (ref 8.9–12.7)
POTASSIUM SERPL-SCNC: 4 MMOL/L (ref 3.5–5.3)
RBC # BLD AUTO: 5.25 MILLION/UL (ref 3.88–5.62)
SODIUM SERPL-SCNC: 138 MMOL/L (ref 135–147)
WBC # BLD AUTO: 10.37 THOUSAND/UL (ref 4.31–10.16)

## 2024-07-28 PROCEDURE — 80048 BASIC METABOLIC PNL TOTAL CA: CPT | Performed by: INTERNAL MEDICINE

## 2024-07-28 PROCEDURE — 85025 COMPLETE CBC W/AUTO DIFF WBC: CPT | Performed by: INTERNAL MEDICINE

## 2024-07-28 PROCEDURE — 99239 HOSP IP/OBS DSCHRG MGMT >30: CPT | Performed by: INTERNAL MEDICINE

## 2024-07-28 RX ORDER — NITROFURANTOIN 25; 75 MG/1; MG/1
100 CAPSULE ORAL 2 TIMES DAILY WITH MEALS
Qty: 6 CAPSULE | Refills: 0 | Status: SHIPPED | OUTPATIENT
Start: 2024-07-29 | End: 2024-08-01

## 2024-07-28 RX ORDER — MECLIZINE HCL 12.5 MG/1
12.5 TABLET ORAL EVERY 8 HOURS PRN
Qty: 20 TABLET | Refills: 0 | Status: SHIPPED | OUTPATIENT
Start: 2024-07-28

## 2024-07-28 RX ADMIN — POLYETHYLENE GLYCOL 3350 17 G: 17 POWDER, FOR SOLUTION ORAL at 08:15

## 2024-07-28 RX ADMIN — CYANOCOBALAMIN TAB 500 MCG 500 MCG: 500 TAB at 08:15

## 2024-07-28 RX ADMIN — BACLOFEN 5 MG: 10 TABLET ORAL at 08:15

## 2024-07-28 RX ADMIN — BUDESONIDE AND FORMOTEROL FUMARATE DIHYDRATE 2 PUFF: 160; 4.5 AEROSOL RESPIRATORY (INHALATION) at 08:17

## 2024-07-28 RX ADMIN — LIDOCAINE 1 PATCH: 50 PATCH CUTANEOUS at 08:15

## 2024-07-28 RX ADMIN — PROPRANOLOL HYDROCHLORIDE 60 MG: 60 CAPSULE, EXTENDED RELEASE ORAL at 08:17

## 2024-07-28 RX ADMIN — PANTOPRAZOLE SODIUM 40 MG: 40 TABLET, DELAYED RELEASE ORAL at 05:14

## 2024-07-28 RX ADMIN — MECLIZINE 12.5 MG: 12.5 TABLET ORAL at 11:54

## 2024-07-28 RX ADMIN — ENOXAPARIN SODIUM 40 MG: 40 INJECTION SUBCUTANEOUS at 08:15

## 2024-07-28 RX ADMIN — AMLODIPINE BESYLATE 10 MG: 10 TABLET ORAL at 08:15

## 2024-07-28 RX ADMIN — GABAPENTIN 300 MG: 300 CAPSULE ORAL at 08:15

## 2024-07-28 RX ADMIN — AZELASTINE 2 SPRAY: 1 SPRAY, METERED NASAL at 08:17

## 2024-07-28 RX ADMIN — CLOPIDOGREL BISULFATE 75 MG: 75 TABLET ORAL at 08:15

## 2024-07-28 RX ADMIN — NITROFURANTOIN MONOHYDRATE/MACROCRYSTALS 100 MG: 75; 25 CAPSULE ORAL at 08:17

## 2024-07-28 NOTE — PLAN OF CARE
Problem: Prexisting or High Potential for Compromised Skin Integrity  Goal: Skin integrity is maintained or improved  Description: INTERVENTIONS:  - Identify patients at risk for skin breakdown  - Assess and monitor skin integrity  - Assess and monitor nutrition and hydration status  - Monitor labs   - Assess for incontinence   - Turn and reposition patient  - Assist with mobility/ambulation  - Relieve pressure over bony prominences  - Avoid friction and shearing  - Provide appropriate hygiene as needed including keeping skin clean and dry  - Evaluate need for skin moisturizer/barrier cream  - Collaborate with interdisciplinary team   - Patient/family teaching  - Consider wound care consult   Outcome: Progressing     Problem: PAIN - ADULT  Goal: Verbalizes/displays adequate comfort level or baseline comfort level  Description: Interventions:  - Encourage patient to monitor pain and request assistance  - Assess pain using appropriate pain scale  - Administer analgesics based on type and severity of pain and evaluate response  - Implement non-pharmacological measures as appropriate and evaluate response  - Consider cultural and social influences on pain and pain management  - Notify physician/advanced practitioner if interventions unsuccessful or patient reports new pain  Outcome: Progressing     Problem: SAFETY ADULT  Goal: Patient will remain free of falls  Description: INTERVENTIONS:  - Educate patient/family on patient safety including physical limitations  - Instruct patient to call for assistance with activity   - Consult OT/PT to assist with strengthening/mobility   - Keep Call bell within reach  - Keep bed low and locked with side rails adjusted as appropriate  - Keep care items and personal belongings within reach  - Initiate and maintain comfort rounds  - Make Fall Risk Sign visible to staff  - Offer Toileting every 2 Hours, in advance of need  - Initiate/Maintain bedalarm  - Obtain necessary fall risk  management equipment: call bell, bed alarm  - Apply yellow socks and bracelet for high fall risk patients  - Consider moving patient to room near nurses station  Outcome: Progressing  Goal: Maintains/Returns to pre admission functional level  Description: INTERVENTIONS:  - Perform AM-PAC 6 Click Basic Mobility/ Daily Activity assessment daily.  - Set and communicate daily mobility goal to care team and patient/family/caregiver.   - Collaborate with rehabilitation services on mobility goals if consulted  - Perform Range of Motion 3 times a day.  - Reposition patient every 2 hours.  - Dangle patient 3 times a day  - Record patient progress and toleration of activity level   Outcome: Progressing     Problem: GENITOURINARY - ADULT  Goal: Urinary catheter remains patent  Description: INTERVENTIONS:  - Assess patency of suprapubic catheter  - If patient has a chronic suprapubic catheter, consider changing catheter if non-functioning  - Follow guidelines for intermittent irrigation of non-functioning urinary catheter  Outcome: Progressing

## 2024-07-28 NOTE — DISCHARGE SUMMARY
[facility]  Discharge- Mathew Rico 1950, 73 y.o. male MRN: [id number]  Unit/Bed#: E2 -01 Encounter: [id number]  Primary Care Provider: Carolina Kumari MD   Date and time admitted to hospital: 7/26/2024  1:11 AM    * Dizziness  Assessment & Plan  S/p CTH, CTA  Reports of vertiginous symptoms  Possibly secondary to vertigo vs acute cystitis  Improvement with meclizine, continue as needed  S/p CTA revealing L internal carotid artery stenosis 50-69%; referral to ambulatory vascular sx eval  Neg troponin    Acute cystitis without hematuria  Assessment & Plan  Possibly secondary to presence of chronic indwelling supra pubic catheter  Catheter changed per visiting RN prior to presentation  Urine culture reveals Staph aureus   Continue Macrobid to complete course    Chronic diastolic congestive heart failure (HCC)  Assessment & Plan  Wt Readings from Last 3 Encounters:   07/26/24 69.2 kg (152 lb 8.9 oz)   07/20/24 81.3 kg (179 lb 3.7 oz)   06/27/24 70 kg (154 lb 5.2 oz)   Clinically compensated      History of CVA (cerebrovascular accident)  Assessment & Plan  CVA prophylaxis with plavix/statin    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 *      Consultations During Hospital Stay:  None      Procedures Performed:     none    Medication Adjustments and Discharge Medications:  Medication Dosing Tapers - Please refer to Discharge Medication List for details on any medication dosing tapers (if applicable to patient).  Discharge Medication List: See after visit summary for reconciled discharge medications.     Wound Care Recommendations:  When applicable, please see wound care section of After Visit Summary.    Diet Recommendations at Discharge:  Diet -        Diet Orders   (From admission, onward)                 Start     Ordered    07/26/24 0607  Diet Regular; Regular House  Diet effective now        References:    Adult Nutrition Support Algorithm    RD Therapeutic Diet Order Protocol   Question Answer Comment   Diet Type Regular    Regular Regular House    RD to adjust diet per protocol? Yes        07/26/24 0607                  Fluid Restriction - No Fluid Restriction at Discharge.      Significant Findings / Test Results:     CTA head and neck with and without contrast    Result Date: 7/26/2024  Impression: CT Brain:  No acute intracranial abnormality. 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 Stable mild to moderate focal stenosis at the left inferior M2 division and right P2 segment. . Workstation performed: [id number]    Hospital Course:     Mathew Rico is a 73 y.o. male patient who originally presented to the hospital on 7/26/2024 due to dizziness and some nausea.  Possibly secondary to vertigo he was given meclizine  "with some improvement.  Patient did not have left internal carotid artery stenosis on CTA amatory referral made for vascular surgery.  Patient did have urine culture revealed Staph aureus started on Macrobid will discharge on Macrobid to complete course.  He is otherwise medically stable for discharge home today with outpatient follow-up    Please see above problem list for further details.      Condition at Discharge: good     Discharge Day Visit / Exam:     Subjective: Patient seen and examined at bedside states he had some dizziness when he woke up but is better now denies any nausea or vomiting, tolerating diet    Vitals: Blood Pressure: 148/67 (07/28/24 0815)  Pulse: 73 (07/28/24 0757)  Temperature: (!) 96.9 °F (36.1 °C) (07/28/24 0757)  Temp Source: Temporal (07/28/24 0757)  Respirations: 16 (07/28/24 0757)  Height: 5' 5\" (165.1 cm) (07/26/24 0628)  Weight - Scale: 69.2 kg (152 lb 8.9 oz) (07/26/24 0628)  SpO2: 94 % (07/28/24 0757)    Physical Exam:    Constitutional: Patient is in no acute distress  HEENT:  Normocephalic, atraumatic  Cardiovascular: Normal S1S2, RRR, No murmurs/rubs/gallops appreciated.  Pulmonary:  Bilateral air entry, No rhonchi/rales/wheezing appreciated  Abdominal: Soft, Bowel sounds present, Non-tender, Non-distended  Extremities:  No cyanosis, clubbing or edema.   Neurological: Wake, alert    Discharge instructions/Information to patient and family:   See after visit summary section titled Discharge Instructions for information provided to patient and family.      Planned Readmission: no      Discharge Statement:  I spent 35 minutes discharging the patient. This time was spent on the day of discharge. I had direct contact with the patient on the day of discharge. Greater than 50% of the total time was spent examining patient, answering all patient questions, arranging and discussing plan of care with patient as well as directly providing post-discharge instructions.  Additional time " then spent on discharge activities.    ** Please Note: This note has been constructed using a voice recognition system **

## 2024-07-28 NOTE — PLAN OF CARE
Problem: Prexisting or High Potential for Compromised Skin Integrity  Goal: Skin integrity is maintained or improved  Description: INTERVENTIONS:  - Identify patients at risk for skin breakdown  - Assess and monitor skin integrity  - Assess and monitor nutrition and hydration status  - Monitor labs   - Assess for incontinence   - Turn and reposition patient  - Assist with mobility/ambulation  - Relieve pressure over bony prominences  - Avoid friction and shearing  - Provide appropriate hygiene as needed including keeping skin clean and dry  - Evaluate need for skin moisturizer/barrier cream  - Collaborate with interdisciplinary team   - Patient/family teaching  - Consider wound care consult   Outcome: Progressing     Problem: PAIN - ADULT  Goal: Verbalizes/displays adequate comfort level or baseline comfort level  Description: Interventions:  - Encourage patient to monitor pain and request assistance  - Assess pain using appropriate pain scale  - Administer analgesics based on type and severity of pain and evaluate response  - Implement non-pharmacological measures as appropriate and evaluate response  - Consider cultural and social influences on pain and pain management  - Notify physician/advanced practitioner if interventions unsuccessful or patient reports new pain  Outcome: Progressing     Problem: INFECTION - ADULT  Goal: Absence or prevention of progression during hospitalization  Description: INTERVENTIONS:  - Assess and monitor for signs and symptoms of infection  - Monitor lab/diagnostic results  - Monitor all insertion sites  - [place] appropriate cooling/warming therapies per order  - Administer medications as ordered  - Instruct and encourage patient and family to use good hand hygiene technique  - Identify and instruct in appropriate isolation precautions (Contact) for MDRO  Outcome: Progressing     Problem: SAFETY ADULT  Goal: Patient will remain free of falls  Description: INTERVENTIONS:  -  Educate patient/family on patient safety including physical limitations  - Instruct patient to call for assistance with activity   - Consult OT/PT to assist with strengthening/mobility   - Keep Call bell within reach  - Keep bed low and locked with side rails adjusted as appropriate  - Keep care items and personal belongings within reach  - Initiate and maintain comfort rounds  - Make Fall Risk Sign visible to staff  - Offer Toileting every 2 Hours, in advance of need  - Initiate/Maintain bedalarm  - Obtain necessary fall risk management equipment: call bell, bed alarm  - Apply yellow socks and bracelet for high fall risk patients  - Consider moving patient to room near nurses station  Outcome: Progressing     Problem: SAFETY ADULT  Goal: Maintain or return to baseline ADL function  Description: INTERVENTIONS:  -  Assess patient's ability to carry out ADLs; assess patient's baseline for ADL function and identify physical deficits which impact ability to perform ADLs (bathing, care of mouth/teeth, toileting, grooming, dressing, etc.)  - Assess/evaluate cause of self-care deficits   - Assess range of motion  - Assess patient's mobility; develop plan if impaired  - Assess patient's need for assistive devices and provide as appropriate  - Encourage maximum independence but intervene and supervise when necessary  - Involve family in performance of ADLs  - Assess for home care needs following discharge   - Consider OT consult to assist with ADL evaluation and planning for discharge  - Provide patient education as appropriate  Outcome: Progressing     Problem: SAFETY ADULT  Goal: Maintains/Returns to pre admission functional level  Description: INTERVENTIONS:  - Perform AM-PAC 6 Click Basic Mobility/ Daily Activity assessment daily.  - Set and communicate daily mobility goal to care team and patient/family/caregiver.   - Collaborate with rehabilitation services on mobility goals if consulted  - Perform Range of Motion 3  times a day.  - Reposition patient every 2 hours.  - Dangle patient 3 times a day  - Record patient progress and toleration of activity level   Outcome: Progressing     Problem: GASTROINTESTINAL - ADULT  Goal: Maintains or returns to baseline bowel function  Description: INTERVENTIONS:  - Assess bowel function  - Encourage oral fluids to ensure adequate hydration  - Administer IV fluids if ordered to ensure adequate hydration  - Administer ordered medications as needed  - Encourage mobilization and activity  - Consider nutritional services referral to assist patient with adequate nutrition and appropriate food choices  Outcome: Progressing     Problem: GENITOURINARY - ADULT  Goal: Urinary catheter remains patent  Description: INTERVENTIONS:  - Assess patency of suprapubic catheter  - If patient has a chronic suprapubic catheter, consider changing catheter if non-functioning  - Follow guidelines for intermittent irrigation of non-functioning urinary catheter  Outcome: Progressing     Problem: METABOLIC, FLUID AND ELECTROLYTES - ADULT  Goal: Glucose maintained within target range  Description: INTERVENTIONS:  - Monitor Blood Glucose as ordered  - Assess for signs and symptoms of hyperglycemia and hypoglycemia  - Administer ordered medications to maintain glucose within target range  - Assess nutritional intake and initiate nutrition service referral as needed  Outcome: Progressing

## 2024-07-28 NOTE — UTILIZATION REVIEW
Initial Clinical Review  Observation on 07/26 @ 0505 upgraded to Inpatient on 07/27 @ 1634. Pt requiring continued stay d/t continued management of UTI and dizziness.    Admission: Date/Time/Statement:   Admission Orders (From admission, onward)       Ordered        07/27/24 1634  INPATIENT ADMISSION  Once            07/26/24 0505  Place in Observation  Once                          Orders Placed This Encounter   Procedures    INPATIENT ADMISSION     Standing Status:   Standing     Number of Occurrences:   1     Order Specific Question:   Level of Care     Answer:   Med Surg [16]     Order Specific Question:   Estimated length of stay     Answer:   More than 2 Midnights     Order Specific Question:   Certification     Answer:   I certify that inpatient services are medically necessary for this patient for a duration of greater than two midnights. See H&P and MD Progress Notes for additional information about the patient's course of treatment.     ED Arrival Information       Expected   -    Arrival   7/26/2024 01:11    Acuity   Urgent              Means of arrival   Ambulance    Escorted by   [facility] ([facility])    Service   Hospitalist    Admission type   Emergency              Arrival complaint   Dizziness             Chief Complaint   Patient presents with    Dizziness     Pt BIBA from home with dizziness since yesterday, pt is bedbound. Pt complains of nausea, CP starting this morning that feels like he has a sticker. Pt has RS head pain. AAOx4. Has visiting nurse, brother and sister live with pt at home and help provide care.       Initial Presentation: 73 y.o. male w/ PMH of MS, bedbound, neurogenic bladder w/ SPC in place, h/o CVA, HTN presented to the ED from home via EMS w/ dizziness x 1 day.  Reports sensation of room spinning. He was in the ED on 07/20 d/t abd pain, CT was done suggestive of cystitis, asymptomatic, low procal, CT w/ constipation, dc'd home w/ bowel regimen.   In the ED, CTA  revealing L internal carotid artery stenosis 50-69%; referral to ambulatory vascular sx eval. Neg trops. UA pending. C/o pruritus. + leukocytosis, chronic.  On exam,  + chronic symmetric LE weakness,  + suprapubic catheter in place. Given po meclizine, IV pepcid..     Admit as observation level of care for dizziness, acute cystitis, pruritus.  Plan: f/u UA and Ucx. Bactrim. Trial of prn hydroxyzine.     Anticipated Length of Stay/Certification Statement: Patient will be admitted on an Observation basis with an anticipated length of stay of  < 2 midnights.   Justification for Hospital Stay: Dizziness, acute cystitis     Date: 07/27   Day 2  IM Notes: Pt still reports dizziness, improving w/ meclizine. Urine culture reveals Staph aureus will follow-up sensitivities. Continue Macrobid for now. Cont Meclizine prn. Hydroxyzine prn.      Certification Statement: The patient will continue to require additional inpatient hospital stay due to dizziness, UTI     Date: 07/28  Day 3: Has surpassed a 2nd midnight with active treatments and services.  Pt reports some dizziness when he woke up but is better now. Tolerating diet. Cont Macrobid. Cont Meclizine prn.      ED Triage Vitals [07/26/24 0127]   Temperature Pulse Respirations Blood Pressure SpO2 Pain Score   98.5 °F (36.9 °C) 79 20 122/56 95 % 5     Weight (last 2 days)       Date/Time Weight    07/26/24 0628 69.2 (152.56)    07/26/24 0127 71.1 (156.75)            Vital Signs (last 3 days)       Date/Time Temp Pulse Resp BP MAP (mmHg) SpO2 O2 Device Patient Position - Orthostatic VS Annamarie Coma Scale Score Pain    07/28/24 1504 97.3 °F (36.3 °C) 74 18 142/83 109 98 % None (Room air) Lying -- --    07/28/24 0900 -- -- -- -- -- -- -- -- -- No Pain    07/28/24 0815 -- -- -- 148/67 -- -- -- -- -- --    07/28/24 0757 96.9 °F (36.1 °C) 73 16 148/67 80 94 % None (Room air) Lying -- --    07/27/24 2058 97.8 °F (36.6 °C) 73 18 133/69 90 97 % None (Room air) Lying -- --    07/27/24  2042 -- -- -- -- -- -- None (Room air) -- 15 3    07/27/24 1547 98.1 °F (36.7 °C) 73 18 131/62 69 95 % None (Room air) Lying -- --    07/27/24 0830 -- -- -- -- -- -- -- -- 15 No Pain    07/27/24 0733 97.2 °F (36.2 °C) 69 16 112/62 67 94 % None (Room air) Lying -- --    07/26/24 2101 98.5 °F (36.9 °C) 73 17 108/55 69 93 % None (Room air) Lying -- No Pain    07/26/24 1643 98.1 °F (36.7 °C) 84 18 141/59 75 95 % None (Room air) Lying -- --    07/26/24 1048 -- 88 -- 145/69 -- -- -- -- -- --    07/26/24 0900 -- -- -- -- -- -- -- -- -- No Pain    07/26/24 0759 97.8 °F (36.6 °C) 88 16 145/69 94 98 % None (Room air) Lying -- --    07/26/24 0636 98.2 °F (36.8 °C) 87 18 141/63 91 97 % None (Room air) Lying 15 --    07/26/24 0628 -- -- -- -- -- -- -- -- -- No Pain    07/26/24 0530 -- 82 18 165/71 102 96 % None (Room air) -- -- --    07/26/24 0430 -- 76 16 113/56 81 94 % None (Room air) -- -- --    07/26/24 0400 -- 80 20 150/56 97 97 % None (Room air) Lying -- --    07/26/24 0300 -- 79 19 157/70 101 95 % None (Room air) -- -- --    07/26/24 0230 -- 74 14 142/63 91 95 % None (Room air) Lying -- --    07/26/24 0131 -- -- -- -- -- -- -- -- 15 --    07/26/24 0127 98.5 °F (36.9 °C) 79 20 122/56 -- 95 % None (Room air) -- -- 5              Pertinent Labs/Diagnostic Test Results:   Radiology:  CTA head and neck with and without contrast   Final Interpretation by Joshua Rice MD (07/26 3020)      CT Brain:  No acute intracranial abnormality. 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 Stable mild to moderate focal stenosis at the left inferior M2 division and right P2 segment. .                  Workstation performed: [id number]           Cardiology:  ECG 12 lead   Final Result by Elver Christine DO (07/26 4420)   Normal sinus rhythm   Increased R/S ratio in V1, consider early transition or posterior infarct   Abnormal ECG   When  "compared with ECG of 26-JUL-2024 01:24, (unconfirmed)   No significant change was found   Confirmed by Elver Christine ([id number]) on 7/26/2024 9:51:50 AM      ECG 12 lead   Final Result by Elver Christine DO (07/26 0949)   Normal sinus rhythm   Increased R/S ratio in V1, consider early transition or posterior infarct   Abnormal ECG   When compared with ECG of 20-JUL-2024 19:19,   No significant change was found   Confirmed by Elver Christine ([id number]) on 7/26/2024 9:49:17 AM        GI:  No orders to display           Results from last 7 days   Lab Units 07/28/24  0511 07/27/24  0604 07/26/24  0153   WBC Thousand/uL 10.37* 10.17* 12.31*   HEMOGLOBIN g/dL 14.7 14.0 15.6   HEMATOCRIT % 45.2 42.6 48.6   PLATELETS Thousands/uL 319 336 362   TOTAL NEUT ABS Thousands/µL 4.81  --  7.90*         Results from last 7 days   Lab Units 07/28/24  0511 07/27/24 0604 07/26/24  0339 07/26/24 0237 07/26/24  0153   SODIUM mmol/L 138 136  --   --  133*   POTASSIUM mmol/L 4.0 4.1 4.6 6.0* 5.7*   CHLORIDE mmol/L 103 104  --   --  97   CO2 mmol/L 28 25  --   --  27   ANION GAP mmol/L 7 7  --   --  9   BUN mg/dL 23 25  --   --  21   CREATININE mg/dL 0.99 1.09  --   --  1.09   EGFR ml/min/1.73sq m 74 66  --   --  66   CALCIUM mg/dL 9.6 9.7  --   --  10.2   MAGNESIUM mg/dL  --   --   --   --  2.2     Results from last 7 days   Lab Units 07/26/24  0153   AST U/L 27   ALT U/L 15   ALK PHOS U/L 96   TOTAL PROTEIN g/dL 8.8*   ALBUMIN g/dL 4.1   TOTAL BILIRUBIN mg/dL 0.35         Results from last 7 days   Lab Units 07/28/24  0511 07/27/24  0604 07/26/24  0153   GLUCOSE RANDOM mg/dL 202* 213* 231*             No results found for: \"BETA-HYDROXYBUTYRATE\"                   Results from last 7 days   Lab Units 07/26/24  0358 07/26/24  0153   HS TNI 0HR ng/L  --  4   HS TNI 2HR ng/L 6  --    HSTNI D2 ng/L 2  --          Results from last 7 days   Lab Units 07/26/24  0153   PROTIME seconds 12.2   INR  0.88   PTT seconds 28     Results from last 7 days "   Lab Units 07/26/24  0153   TSH 3RD GENERATON uIU/mL 4.530*           Results from last 7 days   Lab Units 07/26/24  0153   LACTIC ACID mmol/L 1.0                                                   Results from last 7 days   Lab Units 07/26/24  0556   CLARITY UA  Clear   COLOR UA  Colorless   SPEC GRAV UA  1.046*   PH UA  5.5   GLUCOSE UA mg/dl >=1000 (1%)*   KETONES UA mg/dl Negative   BLOOD UA  Negative   PROTEIN UA mg/dl Negative   NITRITE UA  Negative   BILIRUBIN UA  Negative   UROBILINOGEN UA (BE) mg/dl <2.0   LEUKOCYTES UA  Moderate*   WBC UA /hpf 30-50*   RBC UA /hpf 30-50*   BACTERIA UA /hpf Occasional   EPITHELIAL CELLS WET PREP /hpf None Seen                                 Results from last 7 days   Lab Units 07/26/24  0556   URINE CULTURE  >100,000 cfu/ml Staphylococcus aureus*  20,000-29,000 cfu/ml Candida tropicalis*                   ED Treatment-Medication Administration from 07/26/2024 0111 to 07/26/2024 0624         Date/Time Order Dose Route Action     07/26/2024 0327 iohexol (OMNIPAQUE) 350 MG/ML injection (MULTI-DOSE) 85 mL 85 mL Intravenous Given     07/26/2024 0449 meclizine (ANTIVERT) tablet 25 mg 25 mg Oral Given     07/26/2024 0445 Famotidine (PF) (PEPCID) injection 20 mg 20 mg Intravenous Given            Past Medical History:   Diagnosis Date    Acute laryngitis     Acute nonsuppurative otitis media, unspecified laterality     Arm weakness     Arthritis     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HCC)     Bladder infection     Bronchitis     Constipation     Cough     Diabetes (HCC)     Diabetes mellitus (HCC)     Dizziness     Dysfunction of eustachian tube     Erectile dysfunction of non-organic origin     Fatigue     Glaucoma     Hiatal hernia     Hypertension     Imbalance     Leg muscle spasm     MS (multiple sclerosis) (HCC)     Multiple sclerosis (HCC)     Nephrolithiasis     Neurogenic bladder     No natural teeth     Sinus pain     Spinal stenosis     Strain of thoracic region     Stroke ([facility])      Suprapubic catheter (HCC)      Present on Admission:   Primary hypertension   Multiple sclerosis (HCC)   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 male  Admission Orders:  SCD  PT/OT      Scheduled Medications:  amLODIPine, 10 mg, Oral, Daily   And  atorvastatin, 80 mg, Oral, Daily With Dinner  azelastine, 2 spray, Each Nare, Q12H CIERA  baclofen, 5 mg, Oral, TID  budesonide-formoterol, 2 puff, Inhalation, BID  clopidogrel, 75 mg, Oral, Daily  cyanocobalamin, 500 mcg, Oral, Daily  enoxaparin, 40 mg, Subcutaneous, Daily  gabapentin, 300 mg, Oral, BID  gabapentin, 600 mg, Oral, HS  latanoprost, 1 drop, Both Eyes, HS  lidocaine, 1 patch, Topical, Daily  mirtazapine, 7.5 mg, Oral, HS  nitrofurantoin, 100 mg, Oral, BID With Meals  pantoprazole, 40 mg, Oral, Early Morning  polyethylene glycol, 17 g, Oral, BID  propranolol, 60 mg, Oral, Daily  senna-docusate sodium, 2 tablet, Oral, HS      Continuous IV Infusions: none     PRN Meds:  acetaminophen, 650 mg, Oral, Q6H PRN 07/27 x 1  albuterol, 2.5 mg, Nebulization, Q6H PRN  bisacodyl, 10 mg, Rectal, Daily PRN  hydrOXYzine HCL, 10 mg, Oral, Q6H PRN 07/26 x 1, 07/27 x 1  meclizine, 12.5 mg, Oral, Q8H PRN 07/26 x 1, 07/27 x 2, 07/28 x 1  melatonin, 3 mg, Oral, HS PRN  ondansetron, 4 mg, Intravenous, Q6H PRN        None    Network Utilization Review Department  ATTENTION: Please call with any questions or concerns to [phone] and carefully listen to the prompts so that you are directed to the right person. All voicemails are confidential.   For Discharge needs, contact Care Management DC Support Team at [phone] opt. [phone]  Send all requests for admission clinical reviews, approved or denied determinations and any other requests to dedicated fax number below belonging to the campus where the patient is receiving treatment. List of dedicated fax numbers for  the Facilities:  FACILITY NAME UR FAX NUMBER   ADMISSION DENIALS (Administrative/Medical Necessity) [phone]   DISCHARGE SUPPORT TEAM (NETWORK) [phone]   PARENT CHILD HEALTH (Maternity/NICU/Pediatrics) [phone]   [facility] [phone]   [facility] [phone]   [facility] [phone]   General acute hospital [phone]   Critical access hospital [phone]   [facility] [phone]   [facility] [phone]   [facility] [phone]   [facility] [phone]   [facility] [phone]   [facility] [phone]   [facility] [phone]

## 2024-07-28 NOTE — CASE MANAGEMENT
Case Management Discharge Planning Note    Patient name Mathew Rico  Location [facility] /E2 -* MRN [id number]  : 1950 Date 2024       Current Admission Date: 2024  Current Admission Diagnosis:Dizziness   Patient Active Problem List    Diagnosis Date Noted Date Diagnosed    Dizziness 2024     Pruritus 2024     Acute cystitis without hematuria 2024     Blurred vision, bilateral 2024     Symptoms of upper respiratory infection (URI) 06/15/2024     Chest pain 2024     Acute encephalopathy 2024     Leukocytosis 2024     GERSON on CPAP 2024     Fall 2024     Primary hypertension 2024     Type 2 diabetes mellitus without complication, without long-term current use of insulin (HCC) 2024     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HCC) 2024     Multiple sclerosis (HCC) 2024     Urinary retention 2024     Neck pain 2024     Vitamin B deficiency 2024     Generalized weakness 2024     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     Fall 2023     Weakness 2023     Accidental fall from chair 2023     Constipation 2023     Chronic diastolic congestive heart failure (HCC) 2023     Hyponatremia 2023     Excessive daytime sleepiness 2022     Shortness of breath 2022     Pancreatic mass 2020     Pancreatic lesion 2020     Bladder stones 2019     Bladder neck contracture 2019     History of CVA (cerebrovascular accident) 10/21/2018     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HCC) 2017     Diabetic neuropathy (HCC) 2016     Neurogenic bladder 2016     Thyroid nodule 2015     Cervical spinal stenosis 2014     Generalized anxiety disorder 2014     Obstructive sleep apnea 2013     Benign colon polyp 2012     Esophageal reflux 2012     Fatty liver 2012     Glaucoma 2012     Hyperlipidemia 2012     Multiple  sclerosis (HCC) 06/19/2012     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) 06/19/2012     Primary hypertension 06/11/2012       LOS (days): 1  Geometric Mean LOS (GMLOS) (days):   Days to GMLOS:     OBJECTIVE:  Risk of Unplanned Readmission Score: 58.66         Current admission status: Inpatient   Preferred Pharmacy:   [id number] - [place] - [address]  [address] [address]  Phone: [phone] Fax: [phone]    [place], [place] - [address]  [address] [facility] [address]  Phone: [phone] Fax: [phone]    [facility] [place] - [address]  [facility],  [address]  [facility]  First [address] [facility] [address]  Phone: [phone] Fax: [phone]     [facility] [place] - [address] [address]  Phone: [phone] Fax: [phone]    Primary Care Provider: Carolina Kumari MD    Primary Insurance: [facility]  Secondary Insurance:     DISCHARGE DETAILS:    Discharge planning discussed with:: Patient                                               Treatment Team Recommendation: Home  Discharge Destination Plan:: Home with Home Health Care  Transport at Discharge : Stretcher van        Transported by (Company and Unit #): [employer]  ETA of Transport (Date): 07/28/24  ETA of Transport (Time): 1600     Transfer Mode: Stretcher  Discussed with Patient  there may be an out of pocket expense for transport.          IMM Given (Date):: 07/28/24  IMM Given to:: Patient  IMM was reviewed with the pt. Pt reports understanding of the ability to appeal discharge if feeling as though not medically stable for discharge. Verbal consent obtained due to contact precaution and placed in the scan bin.       Additional Comments: DC summary to be faxed to [employer] was completed.

## 2024-07-29 NOTE — UTILIZATION REVIEW
Notification of Unplanned, Urgent, or   Emergency Inpatient Admission   AUTHORIZATION REQUEST   Admitting Facility [place] [address]  Tax ID: [id number]  NPI: [id number]  [facility]  Admission Level of Care: Inpatient  Place of Service Code: 21     Attending Physician Information  Attending Name and NPI#: Miah Alexander Md [[id number]]  Phone: [phone]     Admission Information  Inpatient Admission Date/Time: 7/27/24  4:34 PM  Discharge Date/Time: 7/28/2024  6:34 PM  Admitting Diagnosis Code/Description:  Dizziness [R42]     Utilization Review Contact  Marsha Garcia, Utilization   Phone: [phone]  Fax: [phone]  Email: [email or web address]  Contact for approvals/pending authorizations, clinical reviews, and discharge.     Physician Advisory Services Contact  Medical Necessity Denial & Umds-bq-Bwsu Discussion  Phone: [phone]  Fax: [phone]  Email: [email or web address]     DISCHARGE SUPPORT TEAM:  For Patients Discharge Needs & Updates  Phone: [phone] opt. 2 Fax: [phone]  Email: [email or web address]

## 2024-07-29 NOTE — UTILIZATION REVIEW
NOTIFICATION OF ADMISSION DISCHARGE   This is a Notification of Discharge from [facility]. Please be advised that this patient has been discharge from our facility. Below you will find the admission and discharge date and time including the patient’s disposition.   UTILIZATION REVIEW CONTACT:  Marsha Garcia  Utilization   Network Utilization Review Department  Phone: [phone] x carefully listen to the prompts. All voicemails are confidential.  Email: [email or web address]     ADMISSION INFORMATION  PRESENTATION DATE: 7/26/2024  1:11 AM  OBERVATION ADMISSION DATE: 07/26/2024 0506  INPATIENT ADMISSION DATE: 7/27/24  4:34 PM   DISCHARGE DATE: 7/28/2024  6:34 PM   DISPOSITION:Home/Self Care    Network Utilization Review Department  ATTENTION: Please call with any questions or concerns to [phone] and carefully listen to the prompts so that you are directed to the right person. All voicemails are confidential.   For Discharge needs, contact Care Management DC Support Team at [phone] opt. 2  Send all requests for admission clinical reviews, approved or denied determinations and any other requests to dedicated fax number below belonging to the campus where the patient is receiving treatment. List of dedicated fax numbers for the Facilities:  FACILITY NAME UR FAX NUMBER   ADMISSION DENIALS (Administrative/Medical Necessity) [phone]   DISCHARGE SUPPORT TEAM ([facility]) [phone]   PARENT CHILD HEALTH (Maternity/NICU/Pediatrics) [phone]   [facility] [phone]   [facility] [phone]   [facility] [phone]   [facility] [phone]   [facility] [phone]   [facility] [phone]   [facility] [phone]   [facility]  [phone]   [facility] [phone]   [facility] [phone]   [facility] [phone]   [facility] [phone]

## 2024-08-25 PROBLEM — N30.00 ACUTE CYSTITIS WITHOUT HEMATURIA: Status: RESOLVED | Noted: 2024-07-26 | Resolved: 2024-08-25

## 2024-08-26 ENCOUNTER — TELEPHONE (OUTPATIENT)
Age: 74
End: 2024-08-26

## 2024-08-26 NOTE — TELEPHONE ENCOUNTER
1ST ATTEMPT,     Called pt no answer, no vm, rings and rings then hangs up     Thank you,     Lynda MONTEIROU/ MARY ALL/ Blurred vision, bilateral     DC- HOME- 6/18/2024    Mathew Rico will need follow up in 6-8 weeks with multiple sclerosis attending or advance practitioner. He will not require outpatient neurological testing.

## 2024-08-31 ENCOUNTER — APPOINTMENT (OUTPATIENT)
Dept: RADIOLOGY | Facility: HOSPITAL | Age: 74
DRG: 059 | End: 2024-08-31
Payer: MEDICARE

## 2024-08-31 ENCOUNTER — APPOINTMENT (EMERGENCY)
Dept: RADIOLOGY | Facility: HOSPITAL | Age: 74
DRG: 059 | End: 2024-08-31
Payer: MEDICARE

## 2024-08-31 ENCOUNTER — HOSPITAL ENCOUNTER (INPATIENT)
Facility: HOSPITAL | Age: 74
LOS: 2 days | Discharge: HOME | DRG: 059 | End: 2024-09-03
Attending: EMERGENCY MEDICINE | Admitting: FAMILY MEDICINE
Payer: MEDICARE

## 2024-08-31 ENCOUNTER — APPOINTMENT (EMERGENCY)
Dept: CT IMAGING | Facility: HOSPITAL | Age: 74
DRG: 059 | End: 2024-08-31
Payer: MEDICARE

## 2024-08-31 ENCOUNTER — APPOINTMENT (OUTPATIENT)
Dept: CT IMAGING | Facility: HOSPITAL | Age: 74
DRG: 059 | End: 2024-08-31
Payer: MEDICARE

## 2024-08-31 DIAGNOSIS — R53.1 GENERALIZED WEAKNESS: ICD-10-CM

## 2024-08-31 DIAGNOSIS — M25.552 PAIN OF LEFT HIP: ICD-10-CM

## 2024-08-31 DIAGNOSIS — R42 DIZZINESS: ICD-10-CM

## 2024-08-31 DIAGNOSIS — R29.90 ABNORMAL NEUROLOGICAL EXAM: Primary | ICD-10-CM

## 2024-08-31 DIAGNOSIS — M79.605 LEFT LEG PAIN: ICD-10-CM

## 2024-08-31 DIAGNOSIS — G35 MULTIPLE SCLEROSIS (HCC): ICD-10-CM

## 2024-08-31 LAB
ALBUMIN SERPL BCG-MCNC: 3.8 G/DL (ref 3.5–5)
ALP SERPL-CCNC: 97 U/L (ref 34–104)
ALT SERPL W P-5'-P-CCNC: 11 U/L (ref 7–52)
ANION GAP SERPL CALCULATED.3IONS-SCNC: 8 MMOL/L (ref 4–13)
APTT PPP: 28 SECONDS (ref 23–34)
AST SERPL W P-5'-P-CCNC: 18 U/L (ref 13–39)
ATRIAL RATE: 100 BPM
BASOPHILS # BLD AUTO: 0.12 THOUSANDS/ÂΜL (ref 0–0.1)
BASOPHILS NFR BLD AUTO: 1 % (ref 0–1)
BILIRUB SERPL-MCNC: 0.41 MG/DL (ref 0.2–1)
BNP SERPL-MCNC: 58 PG/ML (ref 0–100)
BUN SERPL-MCNC: 18 MG/DL (ref 5–25)
CALCIUM SERPL-MCNC: 9.3 MG/DL (ref 8.4–10.2)
CARDIAC TROPONIN I PNL SERPL HS: 8 NG/L
CHLORIDE SERPL-SCNC: 106 MMOL/L (ref 96–108)
CK SERPL-CCNC: 61 U/L (ref 39–308)
CO2 SERPL-SCNC: 23 MMOL/L (ref 21–32)
CREAT SERPL-MCNC: 0.92 MG/DL (ref 0.6–1.3)
EOSINOPHIL # BLD AUTO: 0.76 THOUSAND/ÂΜL (ref 0–0.61)
EOSINOPHIL NFR BLD AUTO: 6 % (ref 0–6)
ERYTHROCYTE [DISTWIDTH] IN BLOOD BY AUTOMATED COUNT: 14.3 % (ref 11.6–15.1)
FLUAV RNA RESP QL NAA+PROBE: NEGATIVE
FLUBV RNA RESP QL NAA+PROBE: NEGATIVE
GFR SERPL CREATININE-BSD FRML MDRD: 81 ML/MIN/1.73SQ M
GLUCOSE SERPL-MCNC: 154 MG/DL (ref 65–140)
GLUCOSE SERPL-MCNC: 183 MG/DL (ref 65–140)
GLUCOSE SERPL-MCNC: 199 MG/DL (ref 65–140)
HCT VFR BLD AUTO: 45.6 % (ref 36.5–49.3)
HGB BLD-MCNC: 14.8 G/DL (ref 12–17)
IMM GRANULOCYTES # BLD AUTO: 0.04 THOUSAND/UL (ref 0–0.2)
IMM GRANULOCYTES NFR BLD AUTO: 0 % (ref 0–2)
INR PPP: 0.9 (ref 0.85–1.19)
LYMPHOCYTES # BLD AUTO: 2.47 THOUSANDS/ÂΜL (ref 0.6–4.47)
LYMPHOCYTES NFR BLD AUTO: 20 % (ref 14–44)
MAGNESIUM SERPL-MCNC: 1.9 MG/DL (ref 1.9–2.7)
MCH RBC QN AUTO: 27.2 PG (ref 26.8–34.3)
MCHC RBC AUTO-ENTMCNC: 32.5 G/DL (ref 31.4–37.4)
MCV RBC AUTO: 84 FL (ref 82–98)
MONOCYTES # BLD AUTO: 0.87 THOUSAND/ÂΜL (ref 0.17–1.22)
MONOCYTES NFR BLD AUTO: 7 % (ref 4–12)
NEUTROPHILS # BLD AUTO: 8.35 THOUSANDS/ÂΜL (ref 1.85–7.62)
NEUTS SEG NFR BLD AUTO: 66 % (ref 43–75)
NRBC BLD AUTO-RTO: 0 /100 WBCS
P AXIS: 66 DEGREES
PLATELET # BLD AUTO: 348 THOUSANDS/UL (ref 149–390)
PMV BLD AUTO: 8.2 FL (ref 8.9–12.7)
POTASSIUM SERPL-SCNC: 4.2 MMOL/L (ref 3.5–5.3)
PR INTERVAL: 184 MS
PROT SERPL-MCNC: 7.5 G/DL (ref 6.4–8.4)
PROTHROMBIN TIME: 12.4 SECONDS (ref 12.3–15)
QRS AXIS: 72 DEGREES
QRSD INTERVAL: 96 MS
QT INTERVAL: 370 MS
QTC INTERVAL: 477 MS
RBC # BLD AUTO: 5.44 MILLION/UL (ref 3.88–5.62)
RSV RNA RESP QL NAA+PROBE: NEGATIVE
SARS-COV-2 RNA RESP QL NAA+PROBE: NEGATIVE
SODIUM SERPL-SCNC: 137 MMOL/L (ref 135–147)
T WAVE AXIS: 40 DEGREES
TSH SERPL DL<=0.05 MIU/L-ACNC: 1.39 UIU/ML (ref 0.45–4.5)
VENTRICULAR RATE: 100 BPM
WBC # BLD AUTO: 12.61 THOUSAND/UL (ref 4.31–10.16)

## 2024-08-31 PROCEDURE — 99215 OFFICE O/P EST HI 40 MIN: CPT | Performed by: PSYCHIATRY & NEUROLOGY

## 2024-08-31 PROCEDURE — 99285 EMERGENCY DEPT VISIT HI MDM: CPT

## 2024-08-31 PROCEDURE — 93005 ELECTROCARDIOGRAM TRACING: CPT

## 2024-08-31 PROCEDURE — 99285 EMERGENCY DEPT VISIT HI MDM: CPT | Performed by: EMERGENCY MEDICINE

## 2024-08-31 PROCEDURE — 85610 PROTHROMBIN TIME: CPT | Performed by: EMERGENCY MEDICINE

## 2024-08-31 PROCEDURE — 82550 ASSAY OF CK (CPK): CPT | Performed by: EMERGENCY MEDICINE

## 2024-08-31 PROCEDURE — 85730 THROMBOPLASTIN TIME PARTIAL: CPT | Performed by: EMERGENCY MEDICINE

## 2024-08-31 PROCEDURE — 36415 COLL VENOUS BLD VENIPUNCTURE: CPT | Performed by: EMERGENCY MEDICINE

## 2024-08-31 PROCEDURE — 70496 CT ANGIOGRAPHY HEAD: CPT

## 2024-08-31 PROCEDURE — 72110 X-RAY EXAM L-2 SPINE 4/>VWS: CPT

## 2024-08-31 PROCEDURE — 85025 COMPLETE CBC W/AUTO DIFF WBC: CPT | Performed by: EMERGENCY MEDICINE

## 2024-08-31 PROCEDURE — 0241U HB NFCT DS VIR RESP RNA 4 TRGT: CPT | Performed by: EMERGENCY MEDICINE

## 2024-08-31 PROCEDURE — 96374 THER/PROPH/DIAG INJ IV PUSH: CPT

## 2024-08-31 PROCEDURE — 84484 ASSAY OF TROPONIN QUANT: CPT | Performed by: EMERGENCY MEDICINE

## 2024-08-31 PROCEDURE — 99223 1ST HOSP IP/OBS HIGH 75: CPT | Performed by: FAMILY MEDICINE

## 2024-08-31 PROCEDURE — 73700 CT LOWER EXTREMITY W/O DYE: CPT

## 2024-08-31 PROCEDURE — 84443 ASSAY THYROID STIM HORMONE: CPT | Performed by: EMERGENCY MEDICINE

## 2024-08-31 PROCEDURE — 83880 ASSAY OF NATRIURETIC PEPTIDE: CPT | Performed by: EMERGENCY MEDICINE

## 2024-08-31 PROCEDURE — 71046 X-RAY EXAM CHEST 2 VIEWS: CPT

## 2024-08-31 PROCEDURE — 73522 X-RAY EXAM HIPS BI 3-4 VIEWS: CPT

## 2024-08-31 PROCEDURE — 80053 COMPREHEN METABOLIC PANEL: CPT | Performed by: EMERGENCY MEDICINE

## 2024-08-31 PROCEDURE — 83735 ASSAY OF MAGNESIUM: CPT | Performed by: EMERGENCY MEDICINE

## 2024-08-31 PROCEDURE — 82948 REAGENT STRIP/BLOOD GLUCOSE: CPT

## 2024-08-31 PROCEDURE — 70498 CT ANGIOGRAPHY NECK: CPT

## 2024-08-31 RX ORDER — DIAZEPAM 10 MG/2ML
2 INJECTION, SOLUTION INTRAMUSCULAR; INTRAVENOUS ONCE
Status: COMPLETED | OUTPATIENT
Start: 2024-08-31 | End: 2024-08-31

## 2024-08-31 RX ORDER — CLOPIDOGREL BISULFATE 75 MG/1
75 TABLET ORAL DAILY
Status: DISCONTINUED | OUTPATIENT
Start: 2024-08-31 | End: 2024-09-03 | Stop reason: HOSPADM

## 2024-08-31 RX ORDER — ATORVASTATIN CALCIUM 80 MG/1
80 TABLET, FILM COATED ORAL DAILY
Status: DISCONTINUED | OUTPATIENT
Start: 2024-08-31 | End: 2024-09-03 | Stop reason: HOSPADM

## 2024-08-31 RX ORDER — BISACODYL 10 MG
10 SUPPOSITORY, RECTAL RECTAL DAILY PRN
Status: DISCONTINUED | OUTPATIENT
Start: 2024-08-31 | End: 2024-09-03 | Stop reason: HOSPADM

## 2024-08-31 RX ORDER — DIAZEPAM 10 MG/2ML
2.5 INJECTION, SOLUTION INTRAMUSCULAR; INTRAVENOUS EVERY 8 HOURS PRN
Status: DISCONTINUED | OUTPATIENT
Start: 2024-08-31 | End: 2024-09-01

## 2024-08-31 RX ORDER — MIRTAZAPINE 7.5 MG/1
7.5 TABLET, FILM COATED ORAL
Status: DISCONTINUED | OUTPATIENT
Start: 2024-08-31 | End: 2024-09-03 | Stop reason: HOSPADM

## 2024-08-31 RX ORDER — MECLIZINE HCL 12.5 MG 12.5 MG/1
12.5 TABLET ORAL EVERY 8 HOURS PRN
Status: DISCONTINUED | OUTPATIENT
Start: 2024-08-31 | End: 2024-09-03 | Stop reason: HOSPADM

## 2024-08-31 RX ORDER — LATANOPROST 50 UG/ML
1 SOLUTION/ DROPS OPHTHALMIC
Status: DISCONTINUED | OUTPATIENT
Start: 2024-08-31 | End: 2024-09-03 | Stop reason: HOSPADM

## 2024-08-31 RX ORDER — BACLOFEN 10 MG/1
10 TABLET ORAL 3 TIMES DAILY
Status: DISCONTINUED | OUTPATIENT
Start: 2024-08-31 | End: 2024-09-01

## 2024-08-31 RX ORDER — INSULIN LISPRO 100 [IU]/ML
1-5 INJECTION, SOLUTION INTRAVENOUS; SUBCUTANEOUS
Status: DISCONTINUED | OUTPATIENT
Start: 2024-08-31 | End: 2024-09-03 | Stop reason: HOSPADM

## 2024-08-31 RX ORDER — BUDESONIDE AND FORMOTEROL FUMARATE DIHYDRATE 160; 4.5 UG/1; UG/1
2 AEROSOL RESPIRATORY (INHALATION) 2 TIMES DAILY
Status: DISCONTINUED | OUTPATIENT
Start: 2024-08-31 | End: 2024-09-03 | Stop reason: HOSPADM

## 2024-08-31 RX ORDER — GABAPENTIN 300 MG/1
300 CAPSULE ORAL 2 TIMES DAILY
Status: DISCONTINUED | OUTPATIENT
Start: 2024-08-31 | End: 2024-09-01

## 2024-08-31 RX ORDER — AMLODIPINE BESYLATE 10 MG/1
10 TABLET ORAL DAILY
Status: DISCONTINUED | OUTPATIENT
Start: 2024-08-31 | End: 2024-09-03 | Stop reason: HOSPADM

## 2024-08-31 RX ORDER — ONDANSETRON 2 MG/ML
4 INJECTION INTRAMUSCULAR; INTRAVENOUS EVERY 6 HOURS PRN
Status: DISCONTINUED | OUTPATIENT
Start: 2024-08-31 | End: 2024-09-03 | Stop reason: HOSPADM

## 2024-08-31 RX ORDER — GABAPENTIN 300 MG/1
600 CAPSULE ORAL
Status: DISCONTINUED | OUTPATIENT
Start: 2024-08-31 | End: 2024-09-03 | Stop reason: HOSPADM

## 2024-08-31 RX ORDER — ALBUTEROL SULFATE 0.83 MG/ML
2.5 SOLUTION RESPIRATORY (INHALATION) EVERY 6 HOURS PRN
Status: DISCONTINUED | OUTPATIENT
Start: 2024-08-31 | End: 2024-09-03 | Stop reason: HOSPADM

## 2024-08-31 RX ORDER — PANTOPRAZOLE SODIUM 40 MG/1
40 TABLET, DELAYED RELEASE ORAL
Status: DISCONTINUED | OUTPATIENT
Start: 2024-08-31 | End: 2024-09-03 | Stop reason: HOSPADM

## 2024-08-31 RX ORDER — FAMOTIDINE 20 MG/1
20 TABLET, FILM COATED ORAL
Status: DISCONTINUED | OUTPATIENT
Start: 2024-08-31 | End: 2024-09-03 | Stop reason: HOSPADM

## 2024-08-31 RX ORDER — LIDOCAINE 50 MG/G
1 PATCH TOPICAL DAILY
Status: DISCONTINUED | OUTPATIENT
Start: 2024-08-31 | End: 2024-09-03 | Stop reason: HOSPADM

## 2024-08-31 RX ORDER — HEPARIN SODIUM 5000 [USP'U]/ML
5000 INJECTION, SOLUTION INTRAVENOUS; SUBCUTANEOUS EVERY 8 HOURS SCHEDULED
Status: DISCONTINUED | OUTPATIENT
Start: 2024-09-01 | End: 2024-09-03 | Stop reason: HOSPADM

## 2024-08-31 RX ORDER — POLYETHYLENE GLYCOL 3350 17 G/17G
17 POWDER, FOR SOLUTION ORAL 2 TIMES DAILY
Status: DISCONTINUED | OUTPATIENT
Start: 2024-08-31 | End: 2024-09-03 | Stop reason: HOSPADM

## 2024-08-31 RX ORDER — PROPRANOLOL HCL 60 MG
60 CAPSULE, EXTENDED RELEASE 24HR ORAL DAILY
Status: DISCONTINUED | OUTPATIENT
Start: 2024-08-31 | End: 2024-09-03 | Stop reason: HOSPADM

## 2024-08-31 RX ORDER — ACETAMINOPHEN 325 MG/1
975 TABLET ORAL EVERY 8 HOURS SCHEDULED
Status: DISCONTINUED | OUTPATIENT
Start: 2024-08-31 | End: 2024-09-03 | Stop reason: HOSPADM

## 2024-08-31 RX ORDER — FLUTICASONE PROPIONATE 50 MCG
2 SPRAY, SUSPENSION (ML) NASAL DAILY
Status: DISCONTINUED | OUTPATIENT
Start: 2024-08-31 | End: 2024-09-03 | Stop reason: HOSPADM

## 2024-08-31 RX ORDER — AMOXICILLIN 250 MG
2 CAPSULE ORAL
Status: DISCONTINUED | OUTPATIENT
Start: 2024-08-31 | End: 2024-09-03 | Stop reason: HOSPADM

## 2024-08-31 RX ORDER — LANOLIN ALCOHOL/MO/W.PET/CERES
3 CREAM (GRAM) TOPICAL
Status: DISCONTINUED | OUTPATIENT
Start: 2024-08-31 | End: 2024-09-03 | Stop reason: HOSPADM

## 2024-08-31 RX ORDER — OXYCODONE HYDROCHLORIDE 5 MG/1
5 TABLET ORAL EVERY 4 HOURS PRN
Status: DISCONTINUED | OUTPATIENT
Start: 2024-08-31 | End: 2024-08-31

## 2024-08-31 RX ORDER — ONDANSETRON 2 MG/ML
4 INJECTION INTRAMUSCULAR; INTRAVENOUS EVERY 8 HOURS PRN
Status: DISCONTINUED | OUTPATIENT
Start: 2024-08-31 | End: 2024-08-31

## 2024-08-31 RX ORDER — MAGNESIUM HYDROXIDE/ALUMINUM HYDROXICE/SIMETHICONE 120; 1200; 1200 MG/30ML; MG/30ML; MG/30ML
30 SUSPENSION ORAL EVERY 4 HOURS PRN
Status: DISCONTINUED | OUTPATIENT
Start: 2024-08-31 | End: 2024-09-03 | Stop reason: HOSPADM

## 2024-08-31 RX ADMIN — CLOPIDOGREL BISULFATE 75 MG: 75 TABLET ORAL at 13:43

## 2024-08-31 RX ADMIN — ACETAMINOPHEN 975 MG: 325 TABLET ORAL at 13:43

## 2024-08-31 RX ADMIN — FLUTICASONE PROPIONATE 2 SPRAY: 50 SPRAY, METERED NASAL at 14:12

## 2024-08-31 RX ADMIN — INSULIN LISPRO 1 UNITS: 100 INJECTION, SOLUTION INTRAVENOUS; SUBCUTANEOUS at 22:11

## 2024-08-31 RX ADMIN — ONDANSETRON 4 MG: 2 INJECTION INTRAMUSCULAR; INTRAVENOUS at 15:28

## 2024-08-31 RX ADMIN — LIDOCAINE 1 PATCH: 50 PATCH TOPICAL at 13:44

## 2024-08-31 RX ADMIN — BACLOFEN 10 MG: 10 TABLET ORAL at 16:53

## 2024-08-31 RX ADMIN — CYANOCOBALAMIN TAB 500 MCG 500 MCG: 500 TAB at 13:42

## 2024-08-31 RX ADMIN — MIRTAZAPINE 7.5 MG: 7.5 TABLET, FILM COATED ORAL at 22:11

## 2024-08-31 RX ADMIN — GABAPENTIN 600 MG: 300 CAPSULE ORAL at 22:11

## 2024-08-31 RX ADMIN — BACLOFEN 10 MG: 10 TABLET ORAL at 22:11

## 2024-08-31 RX ADMIN — IOHEXOL 85 ML: 350 INJECTION, SOLUTION INTRAVENOUS at 10:04

## 2024-08-31 RX ADMIN — Medication 2.5 MG: at 13:43

## 2024-08-31 RX ADMIN — DIAZEPAM 2 MG: 10 INJECTION, SOLUTION INTRAMUSCULAR; INTRAVENOUS at 07:40

## 2024-08-31 RX ADMIN — PANTOPRAZOLE SODIUM 40 MG: 40 TABLET, DELAYED RELEASE ORAL at 13:43

## 2024-08-31 RX ADMIN — LATANOPROST 1 DROP: 50 SOLUTION OPHTHALMIC at 22:11

## 2024-08-31 RX ADMIN — ACETAMINOPHEN 975 MG: 325 TABLET ORAL at 22:11

## 2024-08-31 RX ADMIN — PROPRANOLOL HYDROCHLORIDE 60 MG: 60 CAPSULE, EXTENDED RELEASE ORAL at 14:13

## 2024-08-31 RX ADMIN — AMLODIPINE BESYLATE 10 MG: 10 TABLET ORAL at 13:42

## 2024-08-31 RX ADMIN — INSULIN LISPRO 1 UNITS: 100 INJECTION, SOLUTION INTRAVENOUS; SUBCUTANEOUS at 16:52

## 2024-08-31 RX ADMIN — GABAPENTIN 300 MG: 300 CAPSULE ORAL at 17:00

## 2024-08-31 RX ADMIN — FAMOTIDINE 20 MG: 20 TABLET, FILM COATED ORAL at 18:12

## 2024-08-31 RX ADMIN — SENNOSIDES AND DOCUSATE SODIUM 2 TABLET: 50; 8.6 TABLET ORAL at 22:11

## 2024-08-31 RX ADMIN — BUDESONIDE AND FORMOTEROL FUMARATE DIHYDRATE 2 PUFF: 160; 4.5 AEROSOL RESPIRATORY (INHALATION) at 17:00

## 2024-08-31 RX ADMIN — ATORVASTATIN CALCIUM 80 MG: 80 TABLET, FILM COATED ORAL at 13:43

## 2024-08-31 NOTE — ASSESSMENT & PLAN NOTE
Elevated on admission likely secondary to discomfort  Improving  Continue Norvasc with hold parameters  Patient on Inderal with history of essential tremor

## 2024-08-31 NOTE — H&P
[facility]  H&P  Name: Mathew Rico 74 y.o. male I MRN: [id number]  Unit/Bed#: E2 -01 I Date of Admission: 8/31/2024   Date of Service: 8/31/2024 I Hospital Day: 0      Assessment & Plan   * Left leg pain  Assessment & Plan  Patient with past medical history of multiple sclerosis with neurogenic bladder and chronic SPT tube (primarily bed bound), diastolic CHF, CVA, diabetic neuropathy, hypertension, diabetes, GERSON presents with worsening bilateral leg pain, L>R since yesterday PM.   Neurology evaluated bedside.  Patient has no new focal deficits/red flag symptoms.  No indications for steroid treatment  Exam consistent with LLE external rotation, adduction and limited ROM of hip with tenderness to touch  Leg neurovascularly intact.  No clinical concern for DVT  Patient denies any acute trauma.  He does note working with therapy at home yesterday but remains bedbound.  Concerning for left hip fracture/dislocation or pelvic structural abnormality, orthopedics reviewed in formal x-ray of left hip. No fracture appreciated. They are recommending CT left extremity  Consider pain also in setting of radiculopathy versus painful muscle spasm related to underlying structural concern.  Will schedule Tylenol, offer lidocaine patch.  Neurology increased patient's baclofen from 5 mg 3 times daily to 10 mg 3 times daily.  Valium has been added can be used sparingly for muscle spasms.  Continue home gabapentin.  Would be cautious with opioids due to multiple sedating medications.  Would remain nonweightbearing until imaging  Hold off on formal orthopedic consult until imaging results  PT OT consult  CM consult as patient just recently was discharged with home health rehab  Patient lives at home with his brother and sister.  Attempted to update sister via telephone as she manages his meds but she currently has laryngitis and cannot talk.  Discussed with brother who is aware he is  here.    Dizziness  Assessment & Plan  With recent admission of vertiginous symptoms  Continue meclizine as needed  Patient notes he will still intermittently get dizzy  CT on admission does show right middle ear effusion, will start intranasal steroids  Ultimately patient would benefit from outpatient ENT evaluation  Patient did have recent CTA which displayed left internal carotid artery stenosis and was referred to vascular surgery    Multiple sclerosis ([facility])  Assessment & Plan  Patient is bedbound at baseline  Baclofen has been increased  No concerns for acute flare  PT/OT consults    Type 2 diabetes mellitus without complication, without long-term current use of insulin ([facility])  Assessment & Plan  Lab Results   Component Value Date    HGBA1C 8.0 (H) 06/16/2024     Hold home oral hypoglycemics  Placed on sliding scale insulin with Accu-Cheks, diabetic diet    Primary hypertension  Assessment & Plan  Elevated on admission likely secondary to discomfort  Improving  Continue Norvasc with hold parameters  Patient on Inderal with history of essential tremor    Leukocytosis  Assessment & Plan  Unclear etiology  Recently treated for UTI. No suprapubic pain, urine draining clearly. No fevers/chills.   No URI complaints, follow up formal CXR  Viral panel negative  CTA head neck displaying questionable otomastoiditis.  Patient is without otalgia, postauricular tenderness, edema or erythema.  No excessive protrusion of right auricle. Monitor.   CBC in AM    Constipation  Assessment & Plan  Continue home bowel regimen with MiraLAX and Senokot, monitor output    Chronic diastolic congestive heart failure (HCC)  Assessment & Plan  Wt Readings from Last 3 Encounters:   08/31/24 71.3 kg (157 lb 3 oz)   07/26/24 69.2 kg (152 lb 8.9 oz)   07/20/24 81.3 kg (179 lb 3.7 oz)   Last echo 2018 LVEF 65%, grade 1 diastolic dysfunction  BNP normal on on admission  Clinically euvolemic on exam, not on diuretics PTA        History of CVA  "(cerebrovascular accident)  Assessment & Plan  History of left hemisphere lacunar infarct in 2018  Continue Plavix and atorvastatin for secondary stroke prevention    Obstructive sleep apnea  Assessment & Plan  CPAP qhs    Hyperlipidemia  Assessment & Plan  Continue statin, CK normal    Esophageal reflux  Assessment & Plan  Continue PPI  Patient passed nursing bedside swallow eval.  Recently evaluated by speech therapy and was recommended for regular diet and thins    Neurogenic bladder  Assessment & Plan  Recent admission urology exchange SPT tube  Catheter draining well, no pain  Continue urogenital care    Diabetic neuropathy (HCC)  Assessment & Plan  Patient is on gabapentin 300 mg twice daily and 600 mg qhs  Monitor with other sedating medications       VTE Pharmacologic Prophylaxis:   heparin  Code Status: Level 1 - Full Code   Discussion with family: Updated  (brother) via phone.    Anticipated Length of Stay: Patient will be admitted on an inpatient basis with an anticipated length of stay of greater than 2 midnights secondary to pt/ot eval, left leg pain.    Total Time Spent on Date of Encounter in care of patient: 75 mins. This time was spent on one or more of the following: performing physical exam; counseling and coordination of care; obtaining or reviewing history; documenting in the medical record; reviewing/ordering tests, medications or procedures; communicating with other healthcare professionals and discussing with patient's family/caregivers.    Chief Complaint: \"I have leg pain\"    History of Present Illness:  Mathew Rico is a 74 y.o. male with a PMH of multiple sclerosis, neurogenic bladder, diastolic CHF, constipation, diabetic neuropathy, history of CVA, hyperlipidemia, hypertension, diabetes, GERSON who presents with complaints of bilateral leg pain since last night.  Upon further questioning and examination patient reports the new pain is in his left leg and originates up at " "the hip when he goes to move it.  Patient reports that he does do physical therapy and had physical therapy yesterday but \"does not do much\".  He denies any significant moves or recent trauma at home.  He reports the leg pain was so uncomfortable last night he had difficulty sleeping.  When he goes to move his hip he will feel his knee spasm and kick out.  He is denying any groin or lower extremity numbness or tingling that is new in the left leg.  He denies pain radiation to his back.  Denies any cold sensation to lower extremity.  He does report the bed is uncomfortable on his back but denies any pain when pressed.  Patient otherwise feels at his baseline health.  He does report that he has intermittent dizziness but this is not new.  Denies any recent fevers, chills, chest pain, shortness of breath, cough, abdominal pain, nausea or vomiting.  Patient does have a suprapubic catheter that has been draining well without issues.  Patient denies any new hearing loss, tinnitus, ear drainage or pain.  He is alert to person, place and month but intermittently forgetful.  He notes he did take medicine for the pain last night but cannot member what it is called.  He was at home with his sister who helps him take his medications and would like her to be called and updated.  He denies any nicotine use or alcohol use.  He is primarily bedbound although he reports he would like to walk again.    Review of Systems:  Review of Systems   Constitutional:  Negative for chills and fever.   HENT:  Negative for congestion, ear discharge, ear pain, hearing loss, postnasal drip, rhinorrhea, sore throat, tinnitus and trouble swallowing.    Eyes:  Negative for pain and visual disturbance.   Respiratory:  Negative for cough and shortness of breath.    Cardiovascular:  Negative for chest pain, palpitations and leg swelling.   Gastrointestinal:  Negative for abdominal pain, diarrhea, nausea and vomiting.   Genitourinary:  Negative for " decreased urine volume, flank pain, hematuria, penile pain and penile swelling.   Musculoskeletal:  Positive for arthralgias and gait problem. Negative for back pain.   Neurological:  Positive for dizziness. Negative for light-headedness and headaches.   All other systems reviewed and are negative.      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[facility])     Bladder infection     Bronchitis     Constipation     Cough     Diabetes (HCC)     Diabetes mellitus (HCC)     Dizziness     Dysfunction of eustachian tube     Erectile dysfunction of non-organic origin     Fatigue     Glaucoma     Hiatal hernia     Hypertension     Imbalance     Leg muscle spasm     MS (multiple sclerosis) (HCC)     Multiple sclerosis (HCC)     Nephrolithiasis     Neurogenic bladder     No natural teeth     Sinus pain     Spinal stenosis     Strain of thoracic region     Stroke ([facility])     Suprapubic catheter ([facility])        Past Surgical History:   Procedure Laterality Date    APPENDECTOMY      BRAIN SURGERY      Coil placed in aneurysm    CEREBRAL ANEURYSM REPAIR      CYSTOSCOPY      CYSTOSCOPY      CYSTOSCOPY  06/11/2018    CYSTOSCOPY  01/15/2021    EYE SURGERY      transscleral cyclophotocoagulation noncontact YAG laser    IR SUPRAPUBIC CATHETER CHECK/CHANGE/REINSERTION/UPSIZE  3/28/2024    MYRINGOTOMY      with ventilation tube insertion    AL LITHOLAPAXY SMPL/SM <2.5 CM N/A 5/7/2019    Procedure: CYSTOSCOPY, holmium laser litholapaxy of bladder stones, EXCHANGE OF SP TUBE;  Surgeon: Javy Jeffries MD;  Location: [facility];  Service: Urology    SUPRAPUBIC CATHETER INSERTION         Meds/Allergies:  Prior to Admission medications    Medication Sig Start Date End Date Taking? Authorizing Provider   acetaminophen (TYLENOL) 325 mg tablet Take 2 tablets (650 mg total) by mouth  every 6 (six) hours as needed for mild pain 3/29/24   Cristin Booth MD   albuterol (2.5 mg/3 mL) 0.083 % nebulizer solution Take 3 mL (2.5 mg total) by nebulization every 6 (six) hours as needed for wheezing or shortness of breath 10/3/23   Nelson Cabezas MD   amLODIPine-atorvastatin (CADUET) 10-80 MG per tablet Take 1 tablet by mouth daily    Historical Provider, MD   baclofen 10 mg tablet Take 5 mg by mouth 3 (three) times a day    Historical Provider, MD   bisacodyl (DULCOLAX) 10 mg suppository Insert 10 mg into the rectum daily as needed for constipation    Historical Provider, MD   budesonide-formoterol (SYMBICORT) 160-4.5 mcg/act inhaler Inhale 2 puffs 2 (two) times a day Rinse mouth after use.    Historical Provider, MD   clopidogrel (PLAVIX) 75 mg tablet Take 1 tablet (75 mg total) by mouth daily 10/27/18   Kraig Griffin MD   cromolyn (NASALCHROM) 5.2 MG/ACT nasal spray 1 spray into each nostril 3 (three) times a day 6/18/24   Steve Shook DO   cyanocobalamin (VITAMIN B-12) 500 MCG tablet Take 1 tablet (500 mcg total) by mouth daily 4/2/24   SHA Sutton   Empagliflozin (JARDIANCE) 10 MG TABS tablet Take 10 mg by mouth every morning    Historical Provider, MD   Ergocalciferol (VITAMIN D2 PO) Take 50,000 Units by mouth once a week    Historical Provider, MD   gabapentin (NEURONTIN) 300 mg capsule Take 1 capsule (300 mg total) by mouth 2 (two) times a day 6/3/21   Phan Vazquez DO   gabapentin (NEURONTIN) 300 mg capsule Take 2 capsules (600 mg total) by mouth daily at bedtime 6/3/21   Phan Vazquez DO   latanoprost (XALATAN) 0.005 % ophthalmic solution Administer 1 drop to both eyes daily at bedtime    Historical Provider, MD   lidocaine (LIDODERM) 5 % Apply 1 patch topically over 12 hours daily Remove & Discard patch within 12 hours or as directed by MD 4/3/24   SHA Sutton   meclizine (ANTIVERT) 12.5 MG tablet Take 1 tablet (12.5 mg total) by mouth every 8  (eight) hours as needed for dizziness 24   Miah Alexander MD   melatonin 3 mg Take 3 mg by mouth daily at bedtime as needed    Historical Provider, MD   metFORMIN (GLUCOPHAGE) 1000 MG tablet Take 1,000 mg by mouth 2 (two) times a day with meals    Historical Provider, MD   methenamine hippurate (HIPREX) 1 g tablet Take 1 tablet (1 g total) by mouth 2 (two) times a day with meals 3/27/24   Brianna Ramirez PA-C   mirtazapine (REMERON) 7.5 MG tablet Take 7.5 mg by mouth daily at bedtime    Historical Provider, MD   pantoprazole (PROTONIX) 40 mg tablet Take 40 mg by mouth daily    Historical Provider, MD   polyethylene glycol (MIRALAX) 17 g packet Take 17 g by mouth 2 (two) times a day 24   Steve Shook DO   propranolol (INDERAL LA) 60 mg 24 hr capsule Take 60 mg by mouth daily    Historical Provider, MD   senna-docusate sodium (SENOKOT S) 8.6-50 mg per tablet Take 2 tablets by mouth daily at bedtime    Historical Provider, MD MAYFIELD have reviewed home medications using recent Epic encounter.    Allergies:   Allergies   Allergen Reactions    Cephalexin Rash    Cephalexin Rash     Social History:  Marital Status: Single   Occupation:   Patient Pre-hospital Living Situation: Home, With other family member: sister  Patient Pre-hospital Level of Mobility: non-ambulatory/bed bound  Patient Pre-hospital Diet Restrictions:   Substance Use History:   Social History     Substance and Sexual Activity   Alcohol Use Not Currently     Social History     Tobacco Use   Smoking Status Former    Current packs/day: 0.00    Average packs/day: 0.5 packs/day for 31.0 years (15.5 ttl pk-yrs)    Types: Cigarettes    Start date:     Quit date:     Years since quittin.6   Smokeless Tobacco Never     Social History     Substance and Sexual Activity   Drug Use No       Family History:  Family History   Problem Relation Age of Onset    Heart attack Mother     Stroke Mother     Heart attack Father     Anuerysm Father          In Stomach     Aneurysm Father        Physical Exam:     Vitals:   Blood Pressure: 161/81 (08/31/24 1248)  Pulse: 102 (08/31/24 1248)  Temperature: (!) 97.4 °F (36.3 °C) (08/31/24 1248)  Temp Source: Temporal (08/31/24 1248)  Respirations: 18 (08/31/24 1248)  Weight - Scale: 71.3 kg (157 lb 3 oz) (08/31/24 0614)  SpO2: 98 % (08/31/24 1248)    Physical Exam  Vitals and nursing note reviewed.   Constitutional:       General: He is not in acute distress.     Appearance: He is well-developed. He is ill-appearing (chronically). He is not toxic-appearing or diaphoretic.   HENT:      Right Ear: Ear canal and external ear normal. No swelling or tenderness. A middle ear effusion is present. There is no impacted cerumen. No foreign body. No mastoid tenderness. Tympanic membrane is scarred. Tympanic membrane is not erythematous.      Left Ear: Ear canal and external ear normal. No swelling or tenderness. There is no impacted cerumen. No mastoid tenderness. Tympanic membrane is scarred. Tympanic membrane is not erythematous.   Eyes:      Comments: Right eye chronic esotropia   Cardiovascular:      Rate and Rhythm: Normal rate and regular rhythm.   Pulmonary:      Effort: Pulmonary effort is normal. No respiratory distress.      Breath sounds: Normal breath sounds.   Abdominal:      Palpations: Abdomen is soft.      Tenderness: There is no abdominal tenderness.   Genitourinary:     Comments: SPT tube draining clear yellow urine. New dressing placed. No drainage noted  Musculoskeletal:      Thoracic back: No bony tenderness.      Lumbar back: No spasms, tenderness or bony tenderness.      Right knee: No bony tenderness.      Left knee: No bony tenderness.      Right lower leg: No bony tenderness. No edema.      Left lower leg: No bony tenderness. No edema.      Comments: Whole right lower extremity nontender to palpation, bilateral calf compartments soft and nontender.  Light sensation intact to lower extremities/groin.   Right lower extremity with decreased strength which is baseline.  Left lower extremity tenderness on palpation to left anterior hip.  Left leg externally rotated and shortened.  DP pulses intact.  Lower extremity warm to touch.  Limited range of motion of left hip.  Patient is able to extend left knee.  Upon attempted movement of left hip contracts in pain.   Skin:     General: Skin is warm and dry.      Coloration: Skin is pale.   Neurological:      Mental Status: He is alert.      Comments: Alert to person, place, month. Intermittently forgetful.  No dysarthria, aphasia   Psychiatric:         Mood and Affect: Mood normal.         Behavior: Behavior normal.          Additional Data:     Lab Results:  Results from last 7 days   Lab Units 08/31/24  0742   WBC Thousand/uL 12.61*   HEMOGLOBIN g/dL 14.8   HEMATOCRIT % 45.6   PLATELETS Thousands/uL 348   SEGS PCT % 66   LYMPHO PCT % 20   MONO PCT % 7   EOS PCT % 6     Results from last 7 days   Lab Units 08/31/24  0742   SODIUM mmol/L 137   POTASSIUM mmol/L 4.2   CHLORIDE mmol/L 106   CO2 mmol/L 23   BUN mg/dL 18   CREATININE mg/dL 0.92   ANION GAP mmol/L 8   CALCIUM mg/dL 9.3   ALBUMIN g/dL 3.8   TOTAL BILIRUBIN mg/dL 0.41   ALK PHOS U/L 97   ALT U/L 11   AST U/L 18   GLUCOSE RANDOM mg/dL 199*     Results from last 7 days   Lab Units 08/31/24  0742   INR  0.90         Lab Results   Component Value Date    HGBA1C 8.0 (H) 06/16/2024    HGBA1C 6.4 (H) 03/26/2024    HGBA1C 8.1 (H) 12/04/2023           Lines/Drains:  Invasive Devices       Peripheral Intravenous Line  Duration             Peripheral IV 08/31/24 Dorsal (posterior);Left Hand <1 day    Peripheral IV 08/31/24 Dorsal (posterior);Right Forearm <1 day              Drain  Duration             Suprapubic Catheter 24 Fr. 77 days                        Imaging: Reviewed radiology reports from this admission including: chest xray and xray(s)  CTA head and neck with and without contrast   Final Result by Tez Hodge MD  (08/31 1054)      CT Brain:      1.  No acute intracranial CT abnormality.   2.  Stable mild nonspecific white matter change may indicate combination of patient's known demyelinating disease and microangiopathy.   3.  Bilateral underpneumatized mastoid air cells effusion as well as right middle ear effusion. Correlate for otomastoiditis.      CT Angiography:      1.  Patent major vessels of the [place] without high-grade stenosis.   2.  Redemonstrated stent assisted basilar artery tip aneurysm coiling. Significant streak artifact from the coil materials limits reliable assessment for residual or recurrent aneurysm.   3.  About 55-60% atherosclerotic stenosis in the left ICA origin. No significant stenosis in the right cervical carotid or bilateral vertebral arteries.                     Workstation performed: [id number]         XR chest 2 views   ED Interpretation by Xavier Diaz MD (08/31 0829)   I have reviewed the film, per my independent interpretation : No vascular congestion, no infiltrate/pneumonia, no effusion.  Formal read per radiology        XR hips bilateral 3-4 vw w pelvis if performed    (Results Pending)   CT lower extremity wo contrast left    (Results Pending)   XR spine lumbar minimum 4 views non injury    (Results Pending)       EKG and Other Studies Reviewed on Admission:   EKG: NSR. .    ** Please Note: This note has been constructed using a voice recognition system. **

## 2024-08-31 NOTE — ASSESSMENT & PLAN NOTE
Elevated on admission likely secondary to discomfort  Improved   Continue Norvasc with hold parameters  Patient on Inderal with history of essential tremor

## 2024-08-31 NOTE — ASSESSMENT & PLAN NOTE
With recent admission of vertiginous symptoms  Continue meclizine as needed  Patient notes he will still intermittently get dizzy  CT on admission does show right middle ear effusion, started on intranasal steroids  Ultimately patient would benefit from outpatient ENT evaluation  Patient did have recent CTA which displayed left internal carotid artery stenosis and was referred to vascular surgery

## 2024-08-31 NOTE — ASSESSMENT & PLAN NOTE
Patient with past medical history of multiple sclerosis with neurogenic bladder and chronic SPT tube (primarily bed bound), diastolic CHF, CVA, diabetic neuropathy, hypertension, diabetes, GERSON presents with worsening bilateral leg pain, L>R since since 8/30 PM  Neurology evaluated bedside.  Patient has no new focal deficits/red flag symptoms.  No indications for steroid treatment  Exam consistent with LLE external rotation, adduction and limited ROM of hip with tenderness to touch  Leg neurovascularly intact.  No clinical concern for DVT  Patient denies any acute trauma.  He does note working with therapy at home PTA but remains bedbound.  No evidence of hip, lumbar spine or pelvic structural abnormality on x-ray and CT imaging  Pain in setting of worsening LE spasticity and contractures related to thoracic spine demyelination with cord atrophy secondary to progressive MS  Continue scheduled Tylenol, lidocaine patch.  Neurology increased patient's baclofen from 5 mg 3 times daily to 10 mg 3 times daily.  He is having more sedation with this today, will hold afternoon dose and reinitiate home dose Valium has been added can be used sparingly for extreme muscle muscle spasms.  Continue home gabapentin.  Defer any further opioids due to multiple sedating medications and age.  PT OT consult, patient has refused rehab in the past although suspect he would benefit from higher level of care.  He is living at home and being cared for by his brother and sister  CM consult as patient just recently was discharged with home health rehab  Patient needs to establish care with physiatry, could consider outpatient Botox

## 2024-08-31 NOTE — ASSESSMENT & PLAN NOTE
With recent admission of vertiginous symptoms  Continue meclizine as needed  Patient notes he will still intermittently get dizzy  CT on admission does show right middle ear effusion, will start intranasal steroids  Ultimately patient would benefit from outpatient ENT evaluation  Patient did have recent CTA which displayed left internal carotid artery stenosis and was referred to vascular surgery

## 2024-08-31 NOTE — PLAN OF CARE
Problem: Prexisting or High Potential for Compromised Skin Integrity  Goal: Skin integrity is maintained or improved  Description: INTERVENTIONS:  - Identify patients at risk for skin breakdown  - Assess and monitor skin integrity  - Assess and monitor nutrition and hydration status  - Monitor labs   - Assess for incontinence   - Turn and reposition patient  - Assist with mobility/ambulation  - Relieve pressure over bony prominences  - Avoid friction and shearing  - Provide appropriate hygiene as needed including keeping skin clean and dry  - Evaluate need for skin moisturizer/barrier cream  - Collaborate with interdisciplinary team   - Patient/family teaching  - Consider wound care consult   Outcome: Progressing     Problem: SAFETY ADULT  Goal: Patient will remain free of falls  Description: INTERVENTIONS:  - Educate patient/family on patient safety including physical limitations  - Instruct patient to call for assistance with activity   - Consult OT/PT to assist with strengthening/mobility   - Keep Call bell within reach  - Keep bed low and locked with side rails adjusted as appropriate  - Keep care items and personal belongings within reach  - Initiate and maintain comfort rounds  - Make Fall Risk Sign visible to staff  - Apply yellow socks and bracelet for high fall risk patients  - Consider moving patient to room near nurses station  Outcome: Progressing  Goal: Maintain or return to baseline ADL function  Description: INTERVENTIONS:  -  Assess patient's ability to carry out ADLs; assess patient's baseline for ADL function and identify physical deficits which impact ability to perform ADLs (bathing, care of mouth/teeth, toileting, grooming, dressing, etc.)  - Assess/evaluate cause of self-care deficits   - Assess range of motion  - Assess patient's mobility; develop plan if impaired  - Assess patient's need for assistive devices and provide as appropriate  - Encourage maximum independence but intervene and  supervise when necessary  - Involve family in performance of ADLs  - Assess for home care needs following discharge   - Consider OT consult to assist with ADL evaluation and planning for discharge  - Provide patient education as appropriate  Outcome: Progressing  Goal: Maintains/Returns to pre admission functional level  Description: INTERVENTIONS:  - Perform AM-PAC 6 Click Basic Mobility/ Daily Activity assessment daily.  - Set and communicate daily mobility goal to care team and patient/family/caregiver.   - Collaborate with rehabilitation services on mobility goals if consulted  - Out of bed for toileting  - Record patient progress and toleration of activity level   Outcome: Progressing     Problem: Knowledge Deficit  Goal: Patient/family/caregiver demonstrates understanding of disease process, treatment plan, medications, and discharge instructions  Description: Complete learning assessment and assess knowledge base.  Interventions:  - Provide teaching at level of understanding  - Provide teaching via preferred learning methods  Outcome: Progressing     Problem: Nutrition/Hydration-ADULT  Goal: Nutrient/Hydration intake appropriate for improving, restoring or maintaining nutritional needs  Description: Monitor and assess patient's nutrition/hydration status for malnutrition. Collaborate with interdisciplinary team and initiate plan and interventions as ordered.  Monitor patient's weight and dietary intake as ordered or per policy. Utilize nutrition screening tool and intervene as necessary. Determine patient's food preferences and provide high-protein, high-caloric foods as appropriate.     INTERVENTIONS:  - Monitor oral intake, urinary output, labs, and treatment plans  - Assess nutrition and hydration status and recommend course of action  - Evaluate amount of meals eaten  - Assist patient with eating if necessary   - Allow adequate time for meals  - Recommend/ encourage appropriate diets, oral nutritional  supplements, and vitamin/mineral supplements  - Order, calculate, and assess calorie counts as needed  - Recommend, monitor, and adjust tube feedings and TPN/PPN based on assessed needs  - Assess need for intravenous fluids  - Provide specific nutrition/hydration education as appropriate  - Include patient/family/caregiver in decisions related to nutrition  Outcome: Progressing     Problem: GENITOURINARY - ADULT  Goal: Maintains or returns to baseline urinary function  Description: INTERVENTIONS:  - Assess urinary function  - Encourage oral fluids to ensure adequate hydration if ordered  - Administer IV fluids as ordered to ensure adequate hydration  - Administer ordered medications as needed  - Offer frequent toileting  - Follow urinary retention protocol if ordered  Outcome: Progressing  Goal: Absence of urinary retention  Description: INTERVENTIONS:  - Assess patient’s ability to void and empty bladder  - Monitor I/O  - Bladder scan as needed  - Discuss with physician/AP medications to alleviate retention as needed  - Discuss catheterization for long term situations as appropriate  Outcome: Progressing  Goal: Urinary catheter remains patent  Description: INTERVENTIONS:  - Assess patency of urinary catheter  - If patient has a chronic dela cruz, consider changing catheter if non-functioning  - Follow guidelines for intermittent irrigation of non-functioning urinary catheter  Outcome: Progressing     Problem: METABOLIC, FLUID AND ELECTROLYTES - ADULT  Goal: Electrolytes maintained within normal limits  Description: INTERVENTIONS:  - Monitor labs and assess patient for signs and symptoms of electrolyte imbalances  - Administer electrolyte replacement as ordered  - Monitor response to electrolyte replacements, including repeat lab results as appropriate  - Instruct patient on fluid and nutrition as appropriate  Outcome: Progressing  Goal: Fluid balance maintained  Description: INTERVENTIONS:  - Monitor labs   - Monitor  I/O and WT  - Instruct patient on fluid and nutrition as appropriate  - Assess for signs & symptoms of volume excess or deficit  Outcome: Progressing  Goal: Glucose maintained within target range  Description: INTERVENTIONS:  - Monitor Blood Glucose as ordered  - Assess for signs and symptoms of hyperglycemia and hypoglycemia  - Administer ordered medications to maintain glucose within target range  - Assess nutritional intake and initiate nutrition service referral as needed  Outcome: Progressing     Problem: MUSCULOSKELETAL - ADULT  Goal: Maintain or return mobility to safest level of function  Description: INTERVENTIONS:  - Assess patient's ability to carry out ADLs; assess patient's baseline for ADL function and identify physical deficits which impact ability to perform ADLs (bathing, care of mouth/teeth, toileting, grooming, dressing, etc.)  - Assess/evaluate cause of self-care deficits   - Assess range of motion  - Assess patient's mobility  - Assess patient's need for assistive devices and provide as appropriate  - Encourage maximum independence but intervene and supervise when necessary  - Involve family in performance of ADLs  - Assess for home care needs following discharge   - Consider OT consult to assist with ADL evaluation and planning for discharge  - Provide patient education as appropriate  Outcome: Progressing  Goal: Maintain proper alignment of affected body part  Description: INTERVENTIONS:  - Support, maintain and protect limb and body alignment  - Provide patient/ family with appropriate education  Outcome: Progressing

## 2024-08-31 NOTE — ASSESSMENT & PLAN NOTE
Unclear etiology  Recently treated for UTI. No suprapubic pain, urine draining clearly. No fevers/chills.   No URI complaints, chest x-ray negative  Viral panel negative  CTA head neck displaying questionable otomastoiditis.  Patient is without otalgia, postauricular tenderness, edema or erythema.  No excessive protrusion of right auricle. Monitor.   Resolved

## 2024-08-31 NOTE — ASSESSMENT & PLAN NOTE
Recent admission urology exchange SPT tube  Catheter draining well, no pain  Continue urogenital care

## 2024-08-31 NOTE — ASSESSMENT & PLAN NOTE
Patient with past medical history of multiple sclerosis with neurogenic bladder and chronic SPT tube (primarily bed bound), diastolic CHF, CVA, diabetic neuropathy, hypertension, diabetes, GERSON presents with worsening bilateral leg pain, L>R since yesterday PM.   Neurology evaluated bedside.  Patient has no new focal deficits/red flag symptoms.  No indications for steroid treatment  Exam consistent with LLE external rotation, adduction and limited ROM of hip with tenderness to touch  Leg neurovascularly intact.  No clinical concern for DVT  Patient denies any acute trauma.  He does note working with therapy at home yesterday but remains bedbound.  Concerning for left hip fracture/dislocation or pelvic structural abnormality, orthopedics reviewed and formal x-ray of left hip.  They are recommending CT left extremity  Consider pain also in setting of radiculopathy versus painful muscle spasm related to underlying structural concern.  Will schedule Tylenol, offer lidocaine patch.  Neurology increased patient's baclofen from 5 mg 3 times daily to 10 mg 3 times daily.  Valium has been added can be used sparingly for muscle spasms.  Continue home gabapentin.  Would be cautious with opioids due to multiple sedating medications.  Would remain nonweightbearing until imaging  Hold off on formal orthopedic consult until imaging results  PT OT consult  CM consult as patient just recently was discharged with home health rehab  Patient lives at home with his brother and sister.  Attempted to update sister via telephone as she manages his meds but she currently has laryngitis and cannot talk.  Discussed with brother who is aware he is here.

## 2024-08-31 NOTE — ASSESSMENT & PLAN NOTE
History of left hemisphere lacunar infarct in 2018  Continue Plavix and atorvastatin for secondary stroke prevention

## 2024-08-31 NOTE — ASSESSMENT & PLAN NOTE
Patient is on gabapentin 300 mg twice daily and 600 mg qhs  Hold afternoon dose due to sedation with increased flexeril  Continue to monitor with mental status

## 2024-08-31 NOTE — ASSESSMENT & PLAN NOTE
Lab Results   Component Value Date    HGBA1C 8.0 (H) 06/16/2024     Hold home oral hypoglycemics  Continue sliding scale insulin with Accu-Cheks, diabetic diet

## 2024-08-31 NOTE — ASSESSMENT & PLAN NOTE
Unclear etiology  Recently treated for UTI. No suprapubic pain, urine draining clearly. No fevers/chills.   No URI complaints, follow up formal CXR  Viral panel negative  CTA head neck displaying questionable otomastoiditis.  Patient is without otalgia, postauricular tenderness, edema or erythema.  No excessive protrusion of right auricle. Monitor.   CBC in AM

## 2024-08-31 NOTE — ASSESSMENT & PLAN NOTE
Continue PPI  Patient passed nursing bedside swallow eval.  Recently evaluated by speech therapy and was recommended for regular diet and thins

## 2024-08-31 NOTE — ED PROVIDER NOTES
History  Chief Complaint   Patient presents with    Leg Pain     B/L leg pain since 3am. Hx of MS. VS stable per EMS.       Leg Pain    History of MS with neurogenic bladder, wheelchair-bound normally does not ambulate, indwelling Cage, prior CVA, coming in with complaints of pain in the lower legs/muscle spasms.  If he is laying still he has no pain but when he tries to move he gets spasms through both legs and he says it involves the whole leg on both sides.  He has no numbness.  He has no back pain.  No fever.  He went to bed he was fine.  He says he has been compliant with all his medications.  No fevers or chills.  No chest pain.  He complains of some dyspnea but apparently that has been ongoing and chronic and is not new.  He is not coughing.  He is slightly nauseous but there is no abdominal pain vomiting or diarrhea.  Per my review of the notes his last office visit was a few days ago and he is currently not on any disease modifying agent.  Prior to Admission Medications   Prescriptions Last Dose Informant Patient Reported? Taking?   Empagliflozin (JARDIANCE) 10 MG TABS tablet  Outside Facility (Specify) Yes No   Sig: Take 10 mg by mouth every morning   Ergocalciferol (VITAMIN D2 PO)  Outside Facility (Specify) Yes No   Sig: Take 50,000 Units by mouth once a week   acetaminophen (TYLENOL) 325 mg tablet  Outside Facility (Specify) No No   Sig: Take 2 tablets (650 mg total) by mouth every 6 (six) hours as needed for mild pain   albuterol (2.5 mg/3 mL) 0.083 % nebulizer solution  Outside Facility (Specify) No No   Sig: Take 3 mL (2.5 mg total) by nebulization every 6 (six) hours as needed for wheezing or shortness of breath   amLODIPine-atorvastatin (CADUET) 10-80 MG per tablet  Outside Facility (Specify) Yes No   Sig: Take 1 tablet by mouth daily   baclofen 10 mg tablet  Outside Facility (Specify) Yes No   Sig: Take 5 mg by mouth 3 (three) times a day   bisacodyl (DULCOLAX) 10 mg suppository  Outside Facility  (Specify) Yes No   Sig: Insert 10 mg into the rectum daily as needed for constipation   budesonide-formoterol (SYMBICORT) 160-4.5 mcg/act inhaler  Outside Facility (Specify) Yes No   Sig: Inhale 2 puffs 2 (two) times a day Rinse mouth after use.   clopidogrel (PLAVIX) 75 mg tablet  Outside Facility (Specify) No No   Sig: Take 1 tablet (75 mg total) by mouth daily   cromolyn (NASALCHROM) 5.2 MG/ACT nasal spray   No No   Si spray into each nostril 3 (three) times a day   cyanocobalamin (VITAMIN B-12) 500 MCG tablet  Outside Facility (Specify) No No   Sig: Take 1 tablet (500 mcg total) by mouth daily   gabapentin (NEURONTIN) 300 mg capsule  Outside Facility (Specify) No No   Sig: Take 1 capsule (300 mg total) by mouth 2 (two) times a day   gabapentin (NEURONTIN) 300 mg capsule  Outside Facility (Specify) No No   Sig: Take 2 capsules (600 mg total) by mouth daily at bedtime   latanoprost (XALATAN) 0.005 % ophthalmic solution  Outside Facility (Specify) Yes No   Sig: Administer 1 drop to both eyes daily at bedtime   lidocaine (LIDODERM) 5 %  Outside Facility (Specify) No No   Sig: Apply 1 patch topically over 12 hours daily Remove & Discard patch within 12 hours or as directed by MD   meclizine (ANTIVERT) 12.5 MG tablet   No No   Sig: Take 1 tablet (12.5 mg total) by mouth every 8 (eight) hours as needed for dizziness   melatonin 3 mg  Outside Facility (Specify) Yes No   Sig: Take 3 mg by mouth daily at bedtime as needed   metFORMIN (GLUCOPHAGE) 1000 MG tablet  Outside Facility (Specify) Yes No   Sig: Take 1,000 mg by mouth 2 (two) times a day with meals   methenamine hippurate (HIPREX) 1 g tablet  Outside Facility (Specify) No No   Sig: Take 1 tablet (1 g total) by mouth 2 (two) times a day with meals   mirtazapine (REMERON) 7.5 MG tablet  Outside Facility (Specify) Yes No   Sig: Take 7.5 mg by mouth daily at bedtime   pantoprazole (PROTONIX) 40 mg tablet  Outside Facility (Specify) Yes No   Sig: Take 40 mg by mouth  daily   polyethylene glycol (MIRALAX) 17 g packet   No No   Sig: Take 17 g by mouth 2 (two) times a day   propranolol (INDERAL LA) 60 mg 24 hr capsule  Outside Facility (Specify) Yes No   Sig: Take 60 mg by mouth daily   senna-docusate sodium (SENOKOT S) 8.6-50 mg per tablet  Outside Facility (Specify) Yes No   Sig: Take 2 tablets by mouth daily at bedtime      Facility-Administered Medications: None       Past Medical History:   Diagnosis Date    Acute laryngitis     Acute nonsuppurative otitis media, unspecified laterality     Arm weakness     Arthritis     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HCC)     Bladder infection     Bronchitis     Constipation     Cough     Diabetes (HCC)     Diabetes mellitus (HCC)     Dizziness     Dysfunction of eustachian tube     Erectile dysfunction of non-organic origin     Fatigue     Glaucoma     Hiatal hernia     Hypertension     Imbalance     Leg muscle spasm     MS (multiple sclerosis) ([facility])     Multiple sclerosis ([facility])     Nephrolithiasis     Neurogenic bladder     No natural teeth     Sinus pain     Spinal stenosis     Strain of thoracic region     Stroke ([facility])     Suprapubic catheter ([facility])        Past Surgical History:   Procedure Laterality Date    APPENDECTOMY      BRAIN SURGERY      Coil placed in aneurysm    CEREBRAL ANEURYSM REPAIR      CYSTOSCOPY      CYSTOSCOPY      CYSTOSCOPY  06/11/2018    CYSTOSCOPY  01/15/2021    EYE SURGERY      transscleral cyclophotocoagulation noncontact YAG laser    IR SUPRAPUBIC CATHETER CHECK/CHANGE/REINSERTION/UPSIZE  3/28/2024    MYRINGOTOMY      with ventilation tube insertion    [place] LITHOLAPAXY SMPL/SM <2.5 CM N/A 5/7/2019    Procedure: CYSTOSCOPY, holmium laser litholapaxy of bladder stones, EXCHANGE OF SP TUBE;  Surgeon: Javy Jeffries MD;  Location: [facility];  Service: Urology    SUPRAPUBIC CATHETER INSERTION         Family History   Problem Relation Age of Onset    Heart attack Mother     Stroke Mother     Heart attack Father     Anuerysm  Father         In Stomach     Aneurysm Father      I have reviewed and agree with the history as documented.    E-Cigarette/Vaping    E-Cigarette Use Never User      E-Cigarette/Vaping Substances    Nicotine No     THC No     CBD No     Flavoring No     Other No     Unknown No      Social History     Tobacco Use    Smoking status: Former     Current packs/day: 0.00     Average packs/day: 0.5 packs/day for 31.0 years (15.5 ttl pk-yrs)     Types: Cigarettes     Start date:      Quit date:      Years since quittin.6    Smokeless tobacco: Never   Vaping Use    Vaping status: Never Used   Substance Use Topics    Alcohol use: Not Currently    Drug use: No       Review of Systems    Physical Exam  Physical Exam  Vitals and nursing note reviewed.   Constitutional:       General: He is not in acute distress.     Appearance: Normal appearance. He is well-developed. He is ill-appearing. He is not toxic-appearing or diaphoretic.      Comments: Appears chronically deconditioned   HENT:      Head: Normocephalic and atraumatic.      Right Ear: Hearing normal. No drainage or swelling.      Left Ear: Hearing normal. No drainage or swelling.   Eyes:      General: Lids are normal.         Right eye: No discharge.         Left eye: No discharge.      Conjunctiva/sclera: Conjunctivae normal.   Neck:      Vascular: No JVD.      Trachea: Trachea normal.   Cardiovascular:      Rate and Rhythm: Normal rate and regular rhythm.      Pulses: Normal pulses.      Heart sounds: Normal heart sounds. No murmur heard.     No friction rub. No gallop.      Comments: Intact distal pulses bilaterally  Pulmonary:      Effort: Pulmonary effort is normal. No respiratory distress.      Breath sounds: Normal breath sounds. No stridor. No wheezing or rales.   Chest:      Chest wall: No tenderness.   Abdominal:      Palpations: Abdomen is soft.      Tenderness: There is no abdominal tenderness. There is no guarding or rebound.   Musculoskeletal:          General: Normal range of motion.      Cervical back: Normal range of motion and neck supple.      Right lower leg: No edema.      Left lower leg: No edema.   Skin:     General: Skin is warm and dry.      Coloration: Skin is pale.      Findings: No rash.   Neurological:      General: No focal deficit present.      Mental Status: He is alert.      GCS: GCS eye subscore is 4. GCS verbal subscore is 5. GCS motor subscore is 6.      Cranial Nerves: No cranial nerve deficit.      Sensory: No sensory deficit.      Motor: No abnormal muscle tone.      Comments: Whenever the patient tries to move his legs he appears that he has spasms mostly in both thighs.  Sensory is intact.  He can move both of his legs.  Palpation of the musculature does not cause any overt tenderness.  It is noted that he has clonus only in the left leg.  His Achilles and patellar reflexes are intact.  He may be a little bit more hyperreflexive on the left.   Psychiatric:         Mood and Affect: Mood normal.         Speech: Speech normal.         Behavior: Behavior is cooperative.         Vital Signs  ED Triage Vitals [08/31/24 0614]   Temperature Pulse Respirations Blood Pressure SpO2   98.2 °F (36.8 °C) 98 18 161/76 96 %      Temp Source Heart Rate Source Patient Position - Orthostatic VS BP Location FiO2 (%)   Oral Monitor Lying Right arm --      Pain Score       7           Vitals:    08/31/24 1100 08/31/24 1130 08/31/24 1248 08/31/24 1517   BP: 144/67 150/76 161/81 (!) 172/84   Pulse: 98 105 102 101   Patient Position - Orthostatic VS: Lying Lying Lying Lying         Visual Acuity      ED Medications  Medications   baclofen tablet 10 mg (0 mg Oral Hold 8/31/24 1104)   diazepam (VALIUM) injection 2.5 mg (has no administration in time range)   amLODIPine (NORVASC) tablet 10 mg (10 mg Oral Given 8/31/24 1342)     And   atorvastatin (LIPITOR) tablet 80 mg (80 mg Oral Given 8/31/24 1343)   budesonide-formoterol (SYMBICORT) 160-4.5 mcg/act inhaler  2 puff (has no administration in time range)   clopidogrel (PLAVIX) tablet 75 mg (75 mg Oral Given 8/31/24 1343)   cyanocobalamin (VITAMIN B-12) tablet 500 mcg (500 mcg Oral Given 8/31/24 1342)   gabapentin (NEURONTIN) capsule 300 mg (has no administration in time range)   gabapentin (NEURONTIN) capsule 600 mg (has no administration in time range)   latanoprost (XALATAN) 0.005 % ophthalmic solution 1 drop (has no administration in time range)   lidocaine (LIDODERM) 5 % patch 1 patch (1 patch Topical Medication Applied 8/31/24 1344)   mirtazapine (REMERON) tablet 7.5 mg (has no administration in time range)   propranolol (INDERAL LA) 24 hr capsule 60 mg (60 mg Oral Given 8/31/24 1413)   polyethylene glycol (MIRALAX) packet 17 g (has no administration in time range)   senna-docusate sodium (SENOKOT S) 8.6-50 mg per tablet 2 tablet (has no administration in time range)   pantoprazole (PROTONIX) EC tablet 40 mg (40 mg Oral Given 8/31/24 1343)   bisacodyl (DULCOLAX) rectal suppository 10 mg (has no administration in time range)   albuterol inhalation solution 2.5 mg (has no administration in time range)   acetaminophen (TYLENOL) tablet 975 mg (975 mg Oral Given 8/31/24 1343)   meclizine (ANTIVERT) tablet 12.5 mg (has no administration in time range)   melatonin tablet 3 mg (has no administration in time range)   fluticasone (FLONASE) 50 mcg/act nasal spray 2 spray (2 sprays Each Nare Given 8/31/24 1412)   oxyCODONE (ROXICODONE) split tablet 2.5 mg (2.5 mg Oral Given 8/31/24 1343)   naloxone (NARCAN) 0.04 mg/mL syringe 0.04 mg (has no administration in time range)   heparin (porcine) subcutaneous injection 5,000 Units (has no administration in time range)   insulin lispro (HumALOG/ADMELOG) 100 units/mL subcutaneous injection 1-5 Units (has no administration in time range)   insulin lispro (HumALOG/ADMELOG) 100 units/mL subcutaneous injection 1-5 Units (has no administration in time range)   ondansetron (ZOFRAN) injection  4 mg (4 mg Intravenous Given 8/31/24 1528)   diazepam (VALIUM) injection 2 mg (2 mg Intravenous Given 8/31/24 0740)   iohexol (OMNIPAQUE) 350 MG/ML injection (SINGLE-DOSE) 85 mL (85 mL Intravenous Given 8/31/24 1004)       Diagnostic Studies  Results Reviewed       Procedure Component Value Units Date/Time    FLU/RSV/COVID - if FLU/RSV clinically relevant [[id number]]  (Normal) Collected: 08/31/24 0742    Lab Status: Final result Specimen: Nares from Nose Updated: 08/31/24 1043     SARS-CoV-2 Negative     INFLUENZA A PCR Negative     INFLUENZA B PCR Negative     RSV PCR Negative    Narrative:      This test has been performed using the CoV-2/Flu/RSV plus assay on the [employer] platform. This test has been validated by the  and verified by the performing laboratory.     This test is designed to amplify and detect the following: nucleocapsid (N), envelope (E), and RNA-dependent RNA polymerase (RdRP) genes of the SARS-CoV-2 genome; matrix (M), basic polymerase (PB2), and acidic protein (PA) segments of the influenza A genome; matrix (M) and non-structural protein (NS) segments of the influenza B genome, and the nucleocapsid genes of RSV A and RSV B.     Positive results are indicative of the presence of Flu A, Flu B, RSV, and/or SARS-CoV-2 RNA. Positive results for SARS-CoV-2 or suspected novel influenza should be reported to state, local, or federal health departments according to local reporting requirements.      All results should be assessed in conjunction with clinical presentation and other laboratory markers for clinical management.     FOR PEDIATRIC PATIENTS - copy/paste COVID Guidelines URL to browser: [email or web address]       HS Troponin 0hr (reflex protocol) [[id number]]  (Normal) Collected: 08/31/24 0822    Lab Status: Final result Specimen: Blood from Hand, Left Updated: 08/31/24 0900     hs TnI 0hr 8 ng/L     TSH, 3rd generation  with Free T4 reflex [[id number]]  (Normal) Collected: 08/31/24 0742    Lab Status: Final result Specimen: Blood from Hand, Left Updated: 08/31/24 0830     TSH 3RD GENERATON 1.389 uIU/mL     B-Type Natriuretic Peptide(BNP) [[id number]]  (Normal) Collected: 08/31/24 0742    Lab Status: Final result Specimen: Blood from Hand, Left Updated: 08/31/24 0821     BNP 58 pg/mL     Comprehensive metabolic panel [[id number]]  (Abnormal) Collected: 08/31/24 0742    Lab Status: Final result Specimen: Blood from Hand, Left Updated: 08/31/24 0816     Sodium 137 mmol/L      Potassium 4.2 mmol/L      Chloride 106 mmol/L      CO2 23 mmol/L      ANION GAP 8 mmol/L      BUN 18 mg/dL      Creatinine 0.92 mg/dL      Glucose 199 mg/dL      Calcium 9.3 mg/dL      AST 18 U/L      ALT 11 U/L      Alkaline Phosphatase 97 U/L      Total Protein 7.5 g/dL      Albumin 3.8 g/dL      Total Bilirubin 0.41 mg/dL      eGFR 81 ml/min/1.73sq m     Narrative:      National Kid[employer] Foundation guidelines for Chronic Kidney Disease (CKD):     Stage 1 with normal or high GFR (GFR > 90 mL/min/1.73 square meters)    Stage 2 Mild CKD (GFR = 60-89 mL/min/1.73 square meters)    Stage 3A Moderate CKD (GFR = 45-59 mL/min/1.73 square meters)    Stage 3B Moderate CKD (GFR = 30-44 mL/min/1.73 square meters)    Stage 4 Severe CKD (GFR = 15-29 mL/min/1.73 square meters)    Stage 5 End Stage CKD (GFR <15 mL/min/1.73 square meters)  Note: GFR calculation is accurate only with a steady state creatinine    Magnesium [[id number]]  (Normal) Collected: 08/31/24 0742    Lab Status: Final result Specimen: Blood from Hand, Left Updated: 08/31/24 0816     Magnesium 1.9 mg/dL     CK [[id number]]  (Normal) Collected: 08/31/24 0742    Lab Status: Final result Specimen: Blood from Hand, Left Updated: 08/31/24 0816     Total CK 61 U/L     Protime-INR [[id number]]  (Normal) Collected: 08/31/24 0742    Lab Status: Final result Specimen: Blood from Hand, Left Updated: 08/31/24 0814      Protime 12.4 seconds      INR 0.90    Narrative:      INR Therapeutic Range    Indication                                             INR Range      Atrial Fibrillation                                               2.0-3.0  Hypercoagulable State                                    2.0.2.3  Left Ventricular Asist Device                            2.0-3.0  Mechanical Heart Valve                                  -    Aortic(with afib, MI, embolism, HF, LA enlargement,    and/or coagulopathy)                                     2.0-3.0 (2.5-3.5)     Mitral                                                             2.5-3.5  Prosthetic/Bioprosthetic Heart Valve               2.0-3.0  Venous thromboembolism (VTE: VT, PE        2.0-3.0    APTT [[id number]]  (Normal) Collected: 08/31/24 0742    Lab Status: Final result Specimen: Blood from Hand, Left Updated: 08/31/24 0814     PTT 28 seconds     CBC and differential [[id number]]  (Abnormal) Collected: 08/31/24 0742    Lab Status: Final result Specimen: Blood from Hand, Left Updated: 08/31/24 0756     WBC 12.61 Thousand/uL      RBC 5.44 Million/uL      Hemoglobin 14.8 g/dL      Hematocrit 45.6 %      MCV 84 fL      MCH 27.2 pg      MCHC 32.5 g/dL      RDW 14.3 %      MPV 8.2 fL      Platelets 348 Thousands/uL      nRBC 0 /100 WBCs      Segmented % 66 %      Immature Grans % 0 %      Lymphocytes % 20 %      Monocytes % 7 %      Eosinophils Relative 6 %      Basophils Relative 1 %      Absolute Neutrophils 8.35 Thousands/µL      Absolute Immature Grans 0.04 Thousand/uL      Absolute Lymphocytes 2.47 Thousands/µL      Absolute Monocytes 0.87 Thousand/µL      Eosinophils Absolute 0.76 Thousand/µL      Basophils Absolute 0.12 Thousands/µL                    CTA head and neck with and without contrast   Final Result by Tez Hodge MD (08/31 8754)      CT Brain:      1.  No acute intracranial CT abnormality.   2.  Stable mild nonspecific white matter change may indicate combination of  patient's known demyelinating disease and microangiopathy.   3.  Bilateral underpneumatized mastoid air cells effusion as well as right middle ear effusion. Correlate for otomastoiditis.      CT Angiography:      1.  Patent major vessels of the [place] without high-grade stenosis.   2.  Redemonstrated stent assisted basilar artery tip aneurysm coiling. Significant streak artifact from the coil materials limits reliable assessment for residual or recurrent aneurysm.   3.  About 55-60% atherosclerotic stenosis in the left ICA origin. No significant stenosis in the right cervical carotid or bilateral vertebral arteries.                     Workstation performed: [id number]         XR chest 2 views   ED Interpretation by Xavier Diaz MD (08/31 0829)   I have reviewed the film, per my independent interpretation : No vascular congestion, no infiltrate/pneumonia, no effusion.  Formal read per radiology        XR hips bilateral 3-4 vw w pelvis if performed    (Results Pending)   CT lower extremity wo contrast left    (Results Pending)   XR spine lumbar minimum 4 views non injury    (Results Pending)              Procedures  ECG 12 Lead Documentation Only    Date/Time: 8/31/2024 7:39 AM    Performed by: Xavier Diaz MD  Authorized by: Xavier Diaz MD    Indications / Diagnosis:  Dyspnea  ECG reviewed by me, the ED Provider: yes    Patient location:  ED  Previous ECG:     Comparison to cardiac monitor: Yes    Interpretation:     Interpretation: normal    Rate:     ECG rate:  100    ECG rate assessment: normal    Rhythm:     Rhythm: sinus rhythm    Ectopy:     Ectopy: none    QRS:     QRS axis:  Normal  Conduction:     Conduction: normal    ST segments:     ST segments:  Normal  T waves:     T waves: normal             ED Course  ED Course as of 08/31/24 1604   Sat Aug 31, 2024   0901 CBC and differential(!)  Patient with a mild elevation of the white count.  Has no fever to  suggest an infection.  His hemoglobin is stable.  Platelet count stable.   0902 Comprehensive metabolic panel(!)  Patient has normal electrolytes other than a slightly elevated glucose with a normal bicarb and anion gap.  His LFTs are normal.  Renal function normal.   0902 TSH 3RD GENERATON: 1.389  Normal TSH.   0902 Total CK: 61  CK negative for rhabdo/myositis.   0902 BNP: 58  No evidence of heart failure.   0902 hs TnI 0hr: 8  Has no chest pain or acute ischemic changes.   0933 Discussed the patient with neurology and they agreed that the patient should be admitted to internal medicine.  They would like a CTA head and neck to be performed.   0934 Holding on giving the patient his morning metformin secondary to the IV contrast and the CT.                                               Medical Decision Making  Patient with leg spasms and findings of clonus on the left lower extremity which could signify some upper motor neuron disease.  He has not been noted to have that in his prior charts.  He does not have back pain.  He has not treated with anything with his MS but that would be the differential.  He had a prior stroke.  I did consult neurology who recommended CTA head and neck and admission to the hospital.  They did not recommend steroids at this time given his age.  He is nonambulatory.  No fever to suggest an infection.    Amount and/or Complexity of Data Reviewed  Labs: ordered. Decision-making details documented in ED Course.  Radiology: ordered and independent interpretation performed.    Risk  Prescription drug management.  Decision regarding hospitalization.                 Disposition  Final diagnoses:   Abnormal neurological exam     Time reflects when diagnosis was documented in both MDM as applicable and the Disposition within this note       Time User Action Codes Description Comment    8/31/2024  9:26 AM Xavier Diaz Add [R29.90] Abnormal neurological exam     8/31/2024  1:17 PM María  Seng Lockhart [G35] Multiple sclerosis (HCC)     8/31/2024  1:17 PM Seng Daniel Chantelle [R53.1] Generalized weakness           ED Disposition       ED Disposition   Admit    Condition   Stable    Date/Time   Sat Aug 31, 2024  9:54 AM    Comment   Case was discussed with LISSETTE and the patient's admission status was agreed to be Admission Status: observation status to the service of Dr. Morgan .               Follow-up Information    None         Current Discharge Medication List        CONTINUE these medications which have NOT CHANGED    Details   acetaminophen (TYLENOL) 325 mg tablet Take 2 tablets (650 mg total) by mouth every 6 (six) hours as needed for mild pain    Associated Diagnoses: Chronic suprapubic catheter (HCC)      albuterol (2.5 mg/3 mL) 0.083 % nebulizer solution Take 3 mL (2.5 mg total) by nebulization every 6 (six) hours as needed for wheezing or shortness of breath  Qty: 60 mL, Refills: 3    Associated Diagnoses: Shortness of breath      amLODIPine-atorvastatin (CADUET) 10-80 MG per tablet Take 1 tablet by mouth daily      baclofen 10 mg tablet Take 5 mg by mouth 3 (three) times a day      bisacodyl (DULCOLAX) 10 mg suppository Insert 10 mg into the rectum daily as needed for constipation      budesonide-formoterol (SYMBICORT) 160-4.5 mcg/act inhaler Inhale 2 puffs 2 (two) times a day Rinse mouth after use.      clopidogrel (PLAVIX) 75 mg tablet Take 1 tablet (75 mg total) by mouth daily  Refills: 0    Associated Diagnoses: Chronic suprapubic catheter (HCC); Neurogenic bladder; Cellulitis      cromolyn (NASALCHROM) 5.2 MG/ACT nasal spray 1 spray into each nostril 3 (three) times a day  Qty: 13 mL, Refills: 0    Associated Diagnoses: Cough; Symptoms of upper respiratory infection (URI)      cyanocobalamin (VITAMIN B-12) 500 MCG tablet Take 1 tablet (500 mcg total) by mouth daily    Associated Diagnoses: Vitamin B deficiency      Empagliflozin (JARDIANCE) 10 MG TABS tablet Take 10 mg by mouth every  morning      Ergocalciferol (VITAMIN D2 PO) Take 50,000 Units by mouth once a week      !! gabapentin (NEURONTIN) 300 mg capsule Take 1 capsule (300 mg total) by mouth 2 (two) times a day  Qty: 60 capsule, Refills: 0    Associated Diagnoses: Multiple sclerosis (HCC)      !! gabapentin (NEURONTIN) 300 mg capsule Take 2 capsules (600 mg total) by mouth daily at bedtime  Qty: 30 capsule, Refills: 0    Associated Diagnoses: Multiple sclerosis (HCC)      latanoprost (XALATAN) 0.005 % ophthalmic solution Administer 1 drop to both eyes daily at bedtime      lidocaine (LIDODERM) 5 % Apply 1 patch topically over 12 hours daily Remove & Discard patch within 12 hours or as directed by MD    Associated Diagnoses: Fall, initial encounter      meclizine (ANTIVERT) 12.5 MG tablet Take 1 tablet (12.5 mg total) by mouth every 8 (eight) hours as needed for dizziness  Qty: 20 tablet, Refills: 0    Associated Diagnoses: Dizziness      melatonin 3 mg Take 3 mg by mouth daily at bedtime as needed      metFORMIN (GLUCOPHAGE) 1000 MG tablet Take 1,000 mg by mouth 2 (two) times a day with meals      methenamine hippurate (HIPREX) 1 g tablet Take 1 tablet (1 g total) by mouth 2 (two) times a day with meals  Qty: 180 tablet, Refills: 3    Associated Diagnoses: Chronic suprapubic catheter (HCC); Cystitis      mirtazapine (REMERON) 7.5 MG tablet Take 7.5 mg by mouth daily at bedtime      pantoprazole (PROTONIX) 40 mg tablet Take 40 mg by mouth daily      polyethylene glycol (MIRALAX) 17 g packet Take 17 g by mouth 2 (two) times a day  Qty: 60 each, Refills: 0    Associated Diagnoses: Stercoral colitis      propranolol (INDERAL LA) 60 mg 24 hr capsule Take 60 mg by mouth daily      senna-docusate sodium (SENOKOT S) 8.6-50 mg per tablet Take 2 tablets by mouth daily at bedtime       !! - Potential duplicate medications found. Please discuss with provider.          No discharge procedures on file.    PDMP Review         Value Time User    PDMP  Reviewed  Yes 4/1/2024  3:21 AM Ar Beltran DO            ED Provider  Electronically Signed by             Xavier Diaz MD  08/31/24 [id number]

## 2024-08-31 NOTE — ASSESSMENT & PLAN NOTE
Mr. Rico has presented to [facility] (formerly [facility]) for evaluation of bilateral lower extremity pain and muscle spasm upon moving his lower extremity and 3 am this morning.  Patient reported no signs of new focal sensorimotor dysfunction, no lower back pain, no headache, no facial symmetry, no upper extremity sensorimotor deficit.  Patient has established diagnosis of multiple sclerosis since 1960s with prior imaging consistent with an longitudinally extensive transverse myelitis T3-T5/T6, patient has never been on disease modifying regimen for MS.    CT LLE with severe OA in left hip with no signs of fracture/dislocation.      PLAN:  - Patient will be advised against steroid treatment.  - Patient has never been on DMT for MS, no need to initiate one due to immunosenescence and related concerns.   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  -Patient has remote history of ischemic stroke as he is to continue Plavix 75 mg; repeating CTA H/N and CTH - clinical presentation might involve brainstem/upper cervical spine, r/o vertebral dissection and related pathology advised.   -Patient is to continue gabapentin 300 mg in the morning 300 at noon and 600 mg at night  -Patient continue baclofen 10 mg 3 times daily-patient may benefit from Valium 2.5 mg PRN q 8 hours if severe muscle spasms.  -Patient is to establish with outpatient physiatry team for further management of spasticity and related concerns.    Patient is cleared for discharge with close follow-up with outpatient neurology will be required.

## 2024-08-31 NOTE — ASSESSMENT & PLAN NOTE
Patient is bedbound at baseline  Baclofen has been increased  No concerns for acute flare  PT/OT consults

## 2024-08-31 NOTE — CONSULTS
Consultation - Neurology   Mathew iRco 74 y.o. male MRN: [id number]  Unit/Bed#: E2 -01 Encounter: [id number]      Assessment & Plan     Multiple sclerosis (HCC)  Assessment & Plan  Mr. Rico has presented to [facility] for evaluation of bilateral lower extremity pain and muscle spasm upon moving his lower extremity and 3 am this morning.  Patient reported no signs of new focal sensorimotor dysfunction, no lower back pain, no headache, no facial symmetry, no upper extremity sensorimotor deficit.  Patient has established diagnosis of multiple sclerosis since 1960s with prior imaging consistent with an longitudinally extensive transverse myelitis T3-T5/T6, patient has never been on disease modifying regimen for MS.   NMO completed prior to this admission was negative.  Patient has remote history of stroke and brain aneurysm status post coiling as patient has been taking Plavix 75 mg.  Brain imaging completed in July 2024 with no acute intracranial abnormality. Status post placement of basilar tip aneurysm stent coiling.   CT Angiography: 50-69% stenosis at the proximal left internal carotid artery. Stable mild to moderate focal stenosis at the left inferior M2 division and right P2 segment.   MRI brain 2018: There is a 1.2 cm focal area of diffusion restriction adjacent to the posterior body of the left lateral ventricle in the region of the posterior corona radiata without associated contrast enhancement compatible with acute lacunar infarct.   Moderate periventricular and white matter T2 hyperintensities noted, unchanged from the previous study of September 19, 2016.           Multiple hospitalization 2024 consistent with suprapubic Cage related cystitis/UTI.    PLAN:  -Patient will be advised against steroid treatment.  - exam consistent with LLE adduction and pain in the groin, with the pain radiating along interior thigh. ANY movement in his left leg trigger pain- concern left hip  dislocation/fracture or other structural abnormality, not related to MS or related disability as RLE is paralyzed and no significant pain noted as it is in normal physiologic position.   -Patient will be advised to consider lumbar x-ray to rule out additional pathology even if patient has thoracic spine demyelination T3-T5/T6 with cord atrophy suggestive of secondary progressive MS. acutely developing sharp pain in bilateral lower extremity could be due to radiculopathy versus painful muscle spasm related to underlying send examination versus peripheral tibial disease but patient has preserved pulses.  -Patient has remote history of ischemic stroke as he is to continue Plavix 75 mg; repeating CTA H/N and CTH - clinical presentation might involve brainstem/upper cervical spine, r/o vertebral dissection and related pathology advised.   -Patient is to continue gabapentin 300 mg in the morning 300 at noon and 600 mg at night  -Patient takes baclofen 5 mg 3 times daily as he can safely increase to 10 mg 3 times daily-patient was given Valium 2 mg while in the ED.  -Patient is to establish with outpatient physiatry team for further management of spasticity and related concerns.          Mathew Rico will need follow up in in 6 weeks with multiple sclerosis attending or advance practitioner. He will not require outpatient neurological testing.    History of Present Illness     Reason for Consult / Principal Problem: muscle spasm and LE pain    HPI: Mathew Rico is a 74 y.o. right handed male who presents with Bilateral lower extremity pain since 3 in the morning with history of multiple sclerosis.    Patient described pain on moving his legs and getting muscle spasm in lower extremity for both legs involving the whole leg on the both sides.  Pain is not present when he is laying still.  No new weakness or no new numbness been described.  No back pain has been reported.  Patient has indwelling Cage but no signs of  increased bladder problem with no fevers or chills.  Patient has preserved pulses in bilateral lower extremity  Patient had 15 hospital visits this year for suprapubic catheter dysfunction, UTI/cystitis, fall with acute encephalopathy and B12 deficiency, several admissions with chest pain with remote history of chronic diastolic congestive heart failure and history of stroke.  Patient was discharged on July 28, 2024 with last hospital stay was for dizziness/acute cystitis.    Patient was last seen outpatient neurology in August 2023.  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 Norris as he follows with Dr. Mireles, last CTA was April 2023 with no evidence of aneurysm.    Patient diagnosed with multiple sclerosis in 1960s as he established with [facility] neurology in 2014.  Initial clinical presentation was consistent with bladder/bowel dysfunction and sensory dysfunction lower extremity.  Patient is nonambulatory for long period of time as he using [place] crutches and wheelchair.    Patient has never been on disease modifying regimen.  Patient has suprapubic catheter.  Patient has no demyelination in cervical spine but longitudinally extensive transverse myelitis T3-T5 with minimal disease in brain based on imaging completed in 2014.    Patient was evaluated for stroke in October 2018 due to right upper extremity weakness and worsening right lower extremity weakness which has been patient's baseline, no thrombolysis was provided at that time.  Patient has been on Plavix and statins  Inpatient consult to Neurology  Consult performed by: Samantha Khalil MD  Consult ordered by: Xavier Diaz MD          Review of Systems   Constitutional:  Positive for fatigue.   HENT: Negative.     Eyes: Negative.     Respiratory: Negative.     Cardiovascular: Negative.    Gastrointestinal: Negative.    Endocrine: Negative.    Genitourinary: Negative.    Musculoskeletal:  Positive for arthralgias, gait problem and myalgias.   Skin: Negative.    Allergic/Immunologic: Negative.    Neurological:  Positive for weakness and numbness.   Hematological: Negative.    Psychiatric/Behavioral: Negative.         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[facility])     Bladder infection     Bronchitis     Constipation     Cough     Diabetes (HCC)     Diabetes mellitus (HCC)     Dizziness     Dysfunction of eustachian tube     Erectile dysfunction of non-organic origin     Fatigue     Glaucoma     Hiatal hernia     Hypertension     Imbalance     Leg muscle spasm     MS (multiple sclerosis) (HCC)     Multiple sclerosis (HCC)     Nephrolithiasis     Neurogenic bladder     No natural teeth     Sinus pain     Spinal stenosis     Strain of thoracic region     Stroke ([facility])     Suprapubic catheter ([facility])      Past Surgical History:   Procedure Laterality Date    APPENDECTOMY      BRAIN SURGERY      Coil placed in aneurysm    CEREBRAL ANEURYSM REPAIR      CYSTOSCOPY      CYSTOSCOPY      CYSTOSCOPY  06/11/2018    CYSTOSCOPY  01/15/2021    EYE SURGERY      transscleral cyclophotocoagulation noncontact YAG laser    IR SUPRAPUBIC CATHETER CHECK/CHANGE/REINSERTION/UPSIZE  3/28/2024    MYRINGOTOMY      with ventilation tube insertion    TN LITHOLAPAXY SMPL/SM <2.5 CM N/A 5/7/2019    Procedure: CYSTOSCOPY, holmium laser litholapaxy of bladder stones, EXCHANGE OF SP TUBE;  Surgeon: Javy Jeffries MD;  Location: [facility];  Service: Urology    SUPRAPUBIC CATHETER INSERTION       Social History   Social History     Substance and Sexual Activity   Alcohol Use Not Currently     Social History     Substance  and Sexual Activity   Drug Use No     E-Cigarette/Vaping    E-Cigarette Use Never User      E-Cigarette/Vaping Substances    Nicotine No     THC No     CBD No     Flavoring No     Other No     Unknown No      Social History     Tobacco Use   Smoking Status Former    Current packs/day: 0.00    Average packs/day: 0.5 packs/day for 31.0 years (15.5 ttl pk-yrs)    Types: Cigarettes    Start date:     Quit date:     Years since quittin.6   Smokeless Tobacco Never     Family History: non-contributory    Review of previous medical records was completed.     Meds/Allergies   all current active meds have been reviewed and current meds:   Current Facility-Administered Medications   Medication Dose Route Frequency    acetaminophen (TYLENOL) tablet 975 mg  975 mg Oral Q8H CIERA    albuterol inhalation solution 2.5 mg  2.5 mg Nebulization Q6H PRN    aluminum-magnesium hydroxide-simethicone (MAALOX) oral suspension 30 mL  30 mL Oral Q4H PRN    amLODIPine (NORVASC) tablet 10 mg  10 mg Oral Daily    And    atorvastatin (LIPITOR) tablet 80 mg  80 mg Oral Daily    baclofen tablet 10 mg  10 mg Oral TID    bisacodyl (DULCOLAX) rectal suppository 10 mg  10 mg Rectal Daily PRN    budesonide-formoterol (SYMBICORT) 160-4.5 mcg/act inhaler 2 puff  2 puff Inhalation BID    clopidogrel (PLAVIX) tablet 75 mg  75 mg Oral Daily    cyanocobalamin (VITAMIN B-12) tablet 500 mcg  500 mcg Oral Daily    diazepam (VALIUM) injection 2.5 mg  2.5 mg Intramuscular Q8H PRN    famotidine (PEPCID) tablet 20 mg  20 mg Oral BID AC    fluticasone (FLONASE) 50 mcg/act nasal spray 2 spray  2 spray Each Nare Daily    gabapentin (NEURONTIN) capsule 300 mg  300 mg Oral BID    gabapentin (NEURONTIN) capsule 600 mg  600 mg Oral HS    [START ON 2024] heparin (porcine) subcutaneous injection 5,000 Units  5,000 Units Subcutaneous Q8H [facility]    insulin lispro (HumALOG/ADMELOG) 100 units/mL subcutaneous injection 1-5 Units  1-5 Units Subcutaneous TID AC     insulin lispro (HumALOG/ADMELOG) 100 units/mL subcutaneous injection 1-5 Units  1-5 Units Subcutaneous HS    latanoprost (XALATAN) 0.005 % ophthalmic solution 1 drop  1 drop Both Eyes HS    lidocaine (LIDODERM) 5 % patch 1 patch  1 patch Topical Daily    meclizine (ANTIVERT) tablet 12.5 mg  12.5 mg Oral Q8H PRN    melatonin tablet 3 mg  3 mg Oral HS PRN    mirtazapine (REMERON) tablet 7.5 mg  7.5 mg Oral HS    naloxone (NARCAN) 0.04 mg/mL syringe 0.04 mg  0.04 mg Intravenous Q1MIN PRN    ondansetron (ZOFRAN) injection 4 mg  4 mg Intravenous Q6H PRN    oxyCODONE (ROXICODONE) split tablet 2.5 mg  2.5 mg Oral Q6H PRN    pantoprazole (PROTONIX) EC tablet 40 mg  40 mg Oral Early Morning    polyethylene glycol (MIRALAX) packet 17 g  17 g Oral BID    propranolol (INDERAL LA) 24 hr capsule 60 mg  60 mg Oral Daily    senna-docusate sodium (SENOKOT S) 8.6-50 mg per tablet 2 tablet  2 tablet Oral HS       Allergies   Allergen Reactions    Cephalexin Rash    Cephalexin Rash       Objective   Vitals:Blood pressure (!) 172/84, pulse 101, temperature 97.9 °F (36.6 °C), temperature source Temporal, resp. rate 18, weight 71.3 kg (157 lb 3 oz), SpO2 96%.,Body mass index is 26.16 kg/m².    Intake/Output Summary (Last 24 hours) at 8/31/2024 2130  Last data filed at 8/31/2024 1255  Gross per 24 hour   Intake --   Output 750 ml   Net -750 ml       Invasive Devices:   Invasive Devices       Peripheral Intravenous Line  Duration             Peripheral IV 08/31/24 Dorsal (posterior);Left Hand <1 day    Peripheral IV 08/31/24 Dorsal (posterior);Right Forearm <1 day              Drain  Duration             Suprapubic Catheter 24 Fr. 77 days                    Physical Exam  HENT:      Head: Normocephalic.   Eyes:      Extraocular Movements: Extraocular movements intact and EOM normal.      Pupils: Pupils are equal, round, and reactive to light.   Musculoskeletal:         General: Tenderness and deformity present.      Cervical back: Normal  range of motion.   Neurological:      Mental Status: He is alert and oriented to person, place, and time.      Motor: Weakness present.      Coordination: Coordination abnormal.      Gait: Gait abnormal.      Deep Tendon Reflexes: Reflexes abnormal.      Reflex Scores:       Tricep reflexes are 2+ on the right side.       Bicep reflexes are 2+ on the right side and 2+ on the left side.       Brachioradialis reflexes are 2+ on the right side and 2+ on the left side.       Patellar reflexes are 2+ on the right side and 4+ on the left side.       Achilles reflexes are 3+ on the right side and 4+ on the left side.  Psychiatric:         Mood and Affect: Mood normal.       Neurologic Exam     Mental Status   Oriented to person, place, and time.     Cranial Nerves     CN II   Visual fields full to confrontation.     CN III, IV, VI   Pupils are equal, round, and reactive to light.  Extraocular motions are normal.     CN V   Facial sensation intact.     CN VII   Facial expression full, symmetric.     CN VIII   CN VIII normal.     CN IX, X   CN IX normal.   CN X normal.     CN XI   CN XI normal.     CN XII   CN XII normal.     Motor Exam   Right arm tone: normal  Left arm tone: normal  Right leg tone: spastic  Left leg tone: spastic    Strength   Strength 5/5 except as noted.   Right iliopsoas: 1/5  Left iliopsoas: 4/5  Right quadriceps: 1/5  Left quadriceps: 4/5  Right hamstrin/5  Left hamstrin/5  Right anterior tibial: 1/5  Left anterior tibial: 4/5  Right posterior tibial: 1/5  Left posterior tibial: 4/5  Right peroneal: 1/5  Left peroneal: 4/5  Right gastroc: 1/5  Left gastroc: 4/5    Sensory Exam   Right arm vibration: normal  Left arm vibration: normal  Right leg vibration: decreased from toes  Left leg vibration: decreased from toes    Gait, Coordination, and Reflexes     Gait  Gait: spastic    Reflexes   Right brachioradialis: 2+  Left brachioradialis: 2+  Right biceps: 2+  Left biceps: 2+  Right triceps:  "2+  Right patellar: 2+  Left patellar: 4+  Right achilles: 3+  Left achilles: 4+  Right : 2+  Left : 2+  Left plantar: equivocal  Right ankle clonus: absent  Left ankle clonus: present      Lab Results: I have personally reviewed pertinent reports.  , CBC:   Results from last 7 days   Lab Units 08/31/24  0742   WBC Thousand/uL 12.61*   RBC Million/uL 5.44   HEMOGLOBIN g/dL 14.8   HEMATOCRIT % 45.6   MCV fL 84   PLATELETS Thousands/uL 348   , BMP/CMP:   Results from last 7 days   Lab Units 08/31/24  0742   SODIUM mmol/L 137   POTASSIUM mmol/L 4.2   CHLORIDE mmol/L 106   CO2 mmol/L 23   BUN mg/dL 18   CREATININE mg/dL 0.92   CALCIUM mg/dL 9.3   AST U/L 18   ALT U/L 11   ALK PHOS U/L 97   EGFR ml/min/1.73sq m 81   , Vitamin B12:   , HgBA1C:   , TSH:   Results from last 7 days   Lab Units 08/31/24  0742   TSH 3RD GENERATON uIU/mL 1.389   , Coagulation:   Results from last 7 days   Lab Units 08/31/24  0742   INR  0.90   , Lipid Profile:   , Ammonia:   , Urinalysis:       Invalid input(s): \"URIBILINOGEN\", Drug Screen:     Imaging Studies: I have personally reviewed pertinent reports.    EKG, Pathology, and Other Studies: I have personally reviewed pertinent reports.    VTE Prophylaxis: Fondaparinux (Arixtra)    Code Status: Level 1 - Full Code  Advance Directive and Living Will:      Power of :    POLST:      Counseling / Coordination of Care  Total time spent today 60 minutes. Greater than 50% of total time was spent with the patient and / or family counseling and / or coordination of care. A description of the counseling / coordination of care: Reviewing imaging, discussing with primary team for the plan of care and discharge planning.    "

## 2024-08-31 NOTE — ASSESSMENT & PLAN NOTE
Continue home bowel regimen with MiraLAX and Senokot, no BM here  Tolerating diet, abd soft  Give suppository today

## 2024-08-31 NOTE — ASSESSMENT & PLAN NOTE
Lab Results   Component Value Date    HGBA1C 8.0 (H) 06/16/2024     Hold home oral hypoglycemics  Placed on sliding scale insulin with Accu-Cheks, diabetic diet

## 2024-08-31 NOTE — ASSESSMENT & PLAN NOTE
Recent admission urology exchange SPT tube  Catheter draining well, no pain  Continue exchanges every 4 weeks  Continue urogenital care

## 2024-08-31 NOTE — ASSESSMENT & PLAN NOTE
Patient is on gabapentin 300 mg twice daily and 600 mg qhs  Monitor with other sedating medications

## 2024-08-31 NOTE — ASSESSMENT & PLAN NOTE
Wt Readings from Last 3 Encounters:   08/31/24 71.3 kg (157 lb 3 oz)   07/26/24 69.2 kg (152 lb 8.9 oz)   07/20/24 81.3 kg (179 lb 3.7 oz)   Last echo 2018 LVEF 65%, grade 1 diastolic dysfunction  BNP normal on on admission  Clinically euvolemic on exam, not on diuretics PTA

## 2024-09-01 PROBLEM — D72.829 LEUKOCYTOSIS: Status: RESOLVED | Noted: 2024-04-01 | Resolved: 2024-09-01

## 2024-09-01 LAB
ANION GAP SERPL CALCULATED.3IONS-SCNC: 10 MMOL/L (ref 4–13)
BUN SERPL-MCNC: 18 MG/DL (ref 5–25)
CALCIUM SERPL-MCNC: 9.3 MG/DL (ref 8.4–10.2)
CHLORIDE SERPL-SCNC: 103 MMOL/L (ref 96–108)
CO2 SERPL-SCNC: 22 MMOL/L (ref 21–32)
CREAT SERPL-MCNC: 1.05 MG/DL (ref 0.6–1.3)
ERYTHROCYTE [DISTWIDTH] IN BLOOD BY AUTOMATED COUNT: 14.2 % (ref 11.6–15.1)
GFR SERPL CREATININE-BSD FRML MDRD: 69 ML/MIN/1.73SQ M
GLUCOSE SERPL-MCNC: 141 MG/DL (ref 65–140)
GLUCOSE SERPL-MCNC: 145 MG/DL (ref 65–140)
GLUCOSE SERPL-MCNC: 149 MG/DL (ref 65–140)
GLUCOSE SERPL-MCNC: 217 MG/DL (ref 65–140)
GLUCOSE SERPL-MCNC: 217 MG/DL (ref 65–140)
HCT VFR BLD AUTO: 40.9 % (ref 36.5–49.3)
HGB BLD-MCNC: 13.3 G/DL (ref 12–17)
MCH RBC QN AUTO: 27.8 PG (ref 26.8–34.3)
MCHC RBC AUTO-ENTMCNC: 32.5 G/DL (ref 31.4–37.4)
MCV RBC AUTO: 86 FL (ref 82–98)
PLATELET # BLD AUTO: 322 THOUSANDS/UL (ref 149–390)
PMV BLD AUTO: 8.4 FL (ref 8.9–12.7)
POTASSIUM SERPL-SCNC: 3.5 MMOL/L (ref 3.5–5.3)
RBC # BLD AUTO: 4.78 MILLION/UL (ref 3.88–5.62)
SODIUM SERPL-SCNC: 135 MMOL/L (ref 135–147)
WBC # BLD AUTO: 9.75 THOUSAND/UL (ref 4.31–10.16)

## 2024-09-01 PROCEDURE — 99232 SBSQ HOSP IP/OBS MODERATE 35: CPT | Performed by: PSYCHIATRY & NEUROLOGY

## 2024-09-01 PROCEDURE — 99232 SBSQ HOSP IP/OBS MODERATE 35: CPT

## 2024-09-01 PROCEDURE — 85027 COMPLETE CBC AUTOMATED: CPT

## 2024-09-01 PROCEDURE — 80048 BASIC METABOLIC PNL TOTAL CA: CPT

## 2024-09-01 PROCEDURE — 82948 REAGENT STRIP/BLOOD GLUCOSE: CPT

## 2024-09-01 RX ORDER — GABAPENTIN 300 MG/1
300 CAPSULE ORAL 2 TIMES DAILY
Status: DISCONTINUED | OUTPATIENT
Start: 2024-09-02 | End: 2024-09-03 | Stop reason: HOSPADM

## 2024-09-01 RX ORDER — DIAZEPAM 10 MG/2ML
2.5 INJECTION, SOLUTION INTRAMUSCULAR; INTRAVENOUS EVERY 8 HOURS PRN
Status: DISCONTINUED | OUTPATIENT
Start: 2024-09-01 | End: 2024-09-03 | Stop reason: HOSPADM

## 2024-09-01 RX ORDER — BACLOFEN 10 MG/1
5 TABLET ORAL 3 TIMES DAILY
Status: DISCONTINUED | OUTPATIENT
Start: 2024-09-01 | End: 2024-09-03 | Stop reason: HOSPADM

## 2024-09-01 RX ORDER — BACLOFEN 10 MG/1
5 TABLET ORAL 3 TIMES DAILY
Status: DISCONTINUED | OUTPATIENT
Start: 2024-09-01 | End: 2024-09-01

## 2024-09-01 RX ADMIN — GABAPENTIN 300 MG: 300 CAPSULE ORAL at 09:23

## 2024-09-01 RX ADMIN — FLUTICASONE PROPIONATE 2 SPRAY: 50 SPRAY, METERED NASAL at 09:25

## 2024-09-01 RX ADMIN — PROPRANOLOL HYDROCHLORIDE 60 MG: 60 CAPSULE, EXTENDED RELEASE ORAL at 09:24

## 2024-09-01 RX ADMIN — POLYETHYLENE GLYCOL 3350 17 G: 17 POWDER, FOR SOLUTION ORAL at 09:23

## 2024-09-01 RX ADMIN — BUDESONIDE AND FORMOTEROL FUMARATE DIHYDRATE 2 PUFF: 160; 4.5 AEROSOL RESPIRATORY (INHALATION) at 09:25

## 2024-09-01 RX ADMIN — LIDOCAINE 1 PATCH: 50 PATCH TOPICAL at 09:23

## 2024-09-01 RX ADMIN — Medication 3 MG: at 23:26

## 2024-09-01 RX ADMIN — CLOPIDOGREL BISULFATE 75 MG: 75 TABLET ORAL at 09:23

## 2024-09-01 RX ADMIN — BACLOFEN 5 MG: 10 TABLET ORAL at 21:26

## 2024-09-01 RX ADMIN — ACETAMINOPHEN 975 MG: 325 TABLET ORAL at 06:08

## 2024-09-01 RX ADMIN — BACLOFEN 10 MG: 10 TABLET ORAL at 09:23

## 2024-09-01 RX ADMIN — ACETAMINOPHEN 975 MG: 325 TABLET ORAL at 21:26

## 2024-09-01 RX ADMIN — SENNOSIDES AND DOCUSATE SODIUM 2 TABLET: 50; 8.6 TABLET ORAL at 21:26

## 2024-09-01 RX ADMIN — LATANOPROST 1 DROP: 50 SOLUTION OPHTHALMIC at 21:28

## 2024-09-01 RX ADMIN — HEPARIN SODIUM 5000 UNITS: 5000 INJECTION INTRAVENOUS; SUBCUTANEOUS at 21:25

## 2024-09-01 RX ADMIN — FAMOTIDINE 20 MG: 20 TABLET, FILM COATED ORAL at 15:38

## 2024-09-01 RX ADMIN — FAMOTIDINE 20 MG: 20 TABLET, FILM COATED ORAL at 06:08

## 2024-09-01 RX ADMIN — GABAPENTIN 600 MG: 300 CAPSULE ORAL at 21:25

## 2024-09-01 RX ADMIN — HEPARIN SODIUM 5000 UNITS: 5000 INJECTION INTRAVENOUS; SUBCUTANEOUS at 06:08

## 2024-09-01 RX ADMIN — HEPARIN SODIUM 5000 UNITS: 5000 INJECTION INTRAVENOUS; SUBCUTANEOUS at 15:38

## 2024-09-01 RX ADMIN — INSULIN LISPRO 2 UNITS: 100 INJECTION, SOLUTION INTRAVENOUS; SUBCUTANEOUS at 21:31

## 2024-09-01 RX ADMIN — CYANOCOBALAMIN TAB 500 MCG 500 MCG: 500 TAB at 09:23

## 2024-09-01 RX ADMIN — PANTOPRAZOLE SODIUM 40 MG: 40 TABLET, DELAYED RELEASE ORAL at 06:08

## 2024-09-01 RX ADMIN — ACETAMINOPHEN 975 MG: 325 TABLET ORAL at 15:38

## 2024-09-01 RX ADMIN — MIRTAZAPINE 7.5 MG: 7.5 TABLET, FILM COATED ORAL at 21:25

## 2024-09-01 RX ADMIN — ATORVASTATIN CALCIUM 80 MG: 80 TABLET, FILM COATED ORAL at 09:23

## 2024-09-01 RX ADMIN — AMLODIPINE BESYLATE 10 MG: 10 TABLET ORAL at 09:23

## 2024-09-01 RX ADMIN — POLYETHYLENE GLYCOL 3350 17 G: 17 POWDER, FOR SOLUTION ORAL at 17:44

## 2024-09-01 RX ADMIN — BUDESONIDE AND FORMOTEROL FUMARATE DIHYDRATE 2 PUFF: 160; 4.5 AEROSOL RESPIRATORY (INHALATION) at 17:44

## 2024-09-01 RX ADMIN — INSULIN LISPRO 1 UNITS: 100 INJECTION, SOLUTION INTRAVENOUS; SUBCUTANEOUS at 16:40

## 2024-09-01 NOTE — UTILIZATION REVIEW
NOTIFICATION OF OBSERVATION ADMISSION   AUTHORIZATION REQUEST   SERVICING FACILITY:   [place] [address]  Tax ID: [id number]  NPI: [id number]   ATTENDING PROVIDER:  Attending Name and NPI#: Blanca Morgan Md [[id number]]  Address: [address] [place] [address]  Phone: [phone]     ADMISSION INFORMATION:  Place of Service: On [facility]  Place of Service Code: 22 CPT Code:   Admitting Diagnosis Code/Description:  Abnormal neurological exam [R29.90]  Observation Admission Date/Time: 08/31/2024 @ 0955  Discharge Date/Time: No discharge date for patient encounter.     UTILIZATION REVIEW CONTACT:  Marsha Garcia Utilization   Network Utilization Review Department  Phone: [phone]  Fax: [phone]  Email: [email or web address]  Contact for approvals/pending authorizations, clinical reviews, and discharge.     PHYSICIAN ADVISORY SERVICES:  Medical Necessity Denial & Gjln-db-Biwd Review  Phone: [phone]  Fax: [phone]  Email: [email or web address]     DISCHARGE SUPPORT TEAM:  For Patients Discharge Needs & Updates  Phone: [phone] opt. 2 Fax: [phone]  Email: [email or web address]

## 2024-09-01 NOTE — PLAN OF CARE
Problem: DISCHARGE PLANNING  Goal: Discharge to home or other facility with appropriate resources  Description: INTERVENTIONS:  - Identify barriers to discharge w/patient and caregiver  - Arrange for needed discharge resources and transportation as appropriate  - Identify discharge learning needs (meds, wound care, etc.)  - Arrange for interpretive services to assist at discharge as needed  - Refer to Case Management Department for coordinating discharge planning if the patient needs post-hospital services based on physician/advanced practitioner order or complex needs related to functional status, cognitive ability, or social support system  Outcome: Progressing     Problem: SAFETY ADULT  Goal: Patient will remain free of falls  Description: INTERVENTIONS:  - Educate patient/family on patient safety including physical limitations  - Instruct patient to call for assistance with activity   - Consult OT/PT to assist with strengthening/mobility   - Keep Call bell within reach  - Keep bed low and locked with side rails adjusted as appropriate  - Keep care items and personal belongings within reach  - Initiate and maintain comfort rounds  - Apply yellow socks and bracelet for high fall risk patients  - Consider moving patient to room near nurses station  Outcome: Progressing  Goal: Maintain or return to baseline ADL function  Description: INTERVENTIONS:  -  Assess patient's ability to carry out ADLs; assess patient's baseline for ADL function and identify physical deficits which impact ability to perform ADLs (bathing, care of mouth/teeth, toileting, grooming, dressing, etc.)  - Assess/evaluate cause of self-care deficits   - Assess range of motion  - Assess patient's mobility; develop plan if impaired  - Assess patient's need for assistive devices and provide as appropriate  - Encourage maximum independence but intervene and supervise when necessary  - Involve family in performance of ADLs  - Assess for home care  needs following discharge   - Consider OT consult to assist with ADL evaluation and planning for discharge  - Provide patient education as appropriate  Outcome: Progressing  Goal: Maintains/Returns to pre admission functional level  Description: INTERVENTIONS:  - Perform AM-PAC 6 Click Basic Mobility/ Daily Activity assessment daily.  - Set and communicate daily mobility goal to care team and patient/family/caregiver.   - Collaborate with rehabilitation services on mobility goals if consulted  - Out of bed for toileting  - Record patient progress and toleration of activity level   Outcome: Progressing

## 2024-09-01 NOTE — ASSESSMENT & PLAN NOTE
CTA with redemonstrated stent assisted basilar artery tip aneurysm coiling  Also has about 55 to 60% atherosclerotic stenosis in the left ICA with no significant stenosis on the right and he was referred to vascular as an OP

## 2024-09-01 NOTE — CASE MANAGEMENT
Current admission status: Observation  Referral Reason: Other (Post Acute Home Needs)    Preferred Pharmacy:   [id number] [place] - [address]  [address] [facility] [address]  Phone: [phone] Fax: [phone]    [place], [place] - [address] [facility] [address]  Phone: [phone] Fax: [phone]    [facility] [place], [place] - [address]  [facility],  [address]  [facility]  First [address] [place]  [facility] [address]  Phone: [phone] Fax: [phone]     [facility]. [place], [place] - [address]  [address] [facility] [address]  Phone: [phone] Fax: [phone]    Primary Care Provider: Carolina Kumari MD    Primary Insurance: [facility]  Secondary Insurance:     ASSESSMENT:  Active Health Care Proxies       Guzman Rico Health Care Representative - Brother   Primary Phone: [phone] (Home)                 Advance Directives  Does patient have a Health Care POA?: No  Was patient offered paperwork?: Yes (declined)  Does patient currently have a Health Care decision maker?: No  Does patient have Advance Directives?: No  Was patient offered paperwork?: Yes (declined)  Primary Contact: Guzman Rico (Brother)  [phone] (Home Phone)    Readmission Root Cause  30 Day Readmission: No    Patient Information  Admitted from:: Home  Mental Status: Alert  During Assessment patient was accompanied by: Not accompanied during assessment  Assessment information provided by:: Patient, Brother  Primary Caregiver: Other (Comment) (Brother and sister and [employer])  Caregiver's Name:: Guzman Rico (Brother)  [phone] (Home Phone)  Caregiver's Relationship to Patient:: Family Member  Caregiver's Telephone Number:: Guzman Rico (Brother)  [phone] (Home Phone)  Support Systems: Self, Family members, Other (Comment) ([employer] Staff)  [place] Residence: [facility] do you  live in?: [place]  Home entry access options. Select all that apply.: Stairs  Number of steps to enter home.: 1  Do the steps have railings?: No  Type of Current Residence: [facility]  Living Arrangements: Lives w/ Extended Family (Brother and sister)  Is patient a ?: No    Activities of Daily Living Prior to Admission  Functional Status: Total dependent  Completes ADLs independently?: No  Level of ADL dependence: Total Dependent  Ambulates independently?: No  Level of ambulatory dependence: Total Dependent  Does patient use assisted devices?: Yes  Assisted Devices (DME) used: Hospital Bed  Does patient currently own DME?: Yes  What DME does the patient currently own?: Hospital Bed, Walker, Wheelchair, Shower Chair, Bedside Commode  Does patient have a history of Outpatient Therapy (PT/OT)?: Yes  Does the patient have a history of Short-Term Rehab?: Yes ([facility])  Does patient have a history of HHC?: Yes ([facility])  Does patient currently have HHC?: Yes    Current Home Health Care  Type of Current Home Care Services: Home health aide, Home PT, Nurse visit  Current Home Health Agency:: Other (please enter name in comment) ([facility] [facility])  Current Home Health Follow-Up Provider:: PCP    Patient Information Continued  Income Source: Pension/FCI  Does patient have prescription coverage?: Yes  Does patient receive dialysis treatments?: No  Does patient have a history of substance abuse?: No  Does patient have a history of Mental Health Diagnosis?: No    Means of Transportation  [facility]    Social Determinants of Health (SDOH)      Flowsheet Row Most Recent Value   Housing Stability    In the last 12 months, was there a time when you were not able to pay the mortgage or rent on time? N   In the past 12 months, how many times have you moved where you were living? 0   At any time in the past 12 months, were you homeless or living in a shelter (including now)? N    Transportation Needs    In the past 12 months, has lack of transportation kept you from medical appointments or from getting medications? no   In the past 12 months, has lack of transportation kept you from meetings, work, or from getting things needed for daily living? No   Food Insecurity    Within the past 12 months, you worried that your food would run out before you got the money to buy more. Never true   Within the past 12 months, the food you bought just didn't last and you didn't have money to get more. Never true   Utilities    In the past 12 months has the electric, gas, oil, or water company threatened to shut off services in your home? No          DISCHARGE DETAILS:    Discharge planning discussed with:: Patient and brothpercy Hinds  Freedom of Choice: Yes     CM contacted family/caregiver?: Yes  Were Treatment Team discharge recommendations reviewed with patient/caregiver?: Yes     Contacts  Patient Contacts: Guzman Rico (Brother)  [phone] (Home Phone)  Relationship to Patient:: Family  Contact Method: Phone  Phone Number: Guzman Rico (Brother)  [phone] (Home Phone)  Reason/Outcome: Discharge Planning, Continuity of Care    DME Referral Provided  Referral made for DME?: No     Additional Comments: Met with patient at bedside.  Call to brother Guzman and completed CM assessment.  Patient has [facility] [employer] and they provide a HHA/SN/PT in home.  Patient is bed bound at baseline.  Lives with brother and sister whom assit with IADL's and ADL's.  Will need transport home.  Waiting therapy evals for discharge recs

## 2024-09-01 NOTE — ASSESSMENT & PLAN NOTE
Patient presented with a chief complaint of positional left hip/groin pain  - left hip x-ray with no fracture or dislocation  - CT LLE with severe degenerative changes in left hip  - LLE has full strength as at baseline and ROM limited due to pain   - evaluation by Ortho for discharge planning  - establishing with outpatient Physiatry team advised for worsening spasticity and developing LE contracture related to advanced MS

## 2024-09-01 NOTE — UTILIZATION REVIEW
Initial Clinical Review    Admission: Date/Time/Statement: OBS 8/31@0954 UPGRADED TO INPT 9/1@1444 D/T L LEG PAIN WITH suprapubic Cage related cystitis/UTI WITH PMH OF MS WITH NEED FOR IMAGING, BACLOFEN, ANALGESICS, PT/OT  Admission Orders (From admission, onward)       Ordered        09/01/24 1444  INPATIENT ADMISSION  Once            08/31/24 0954  Place in Observation  Once                           INPATIENT ADMISSION     Standing Status:   Standing     Number of Occurrences:   1     Order Specific Question:   Level of Care     Answer:   Med Surg [16]     Order Specific Question:   Estimated length of stay     Answer:   More than 2 Midnights     Order Specific Question:   Certification     Answer:   I certify that inpatient services are medically necessary for this patient for a duration of greater than two midnights. See H&P and MD Progress Notes for additional information about the patient's course of treatment.     ED Arrival Information       Expected   -    Arrival   8/31/2024 06:10    Acuity   Urgent              Means of arrival   Ambulance    Escorted by   [facility] ([facility])    Service   Hospitalist    Admission type   Emergency              Arrival complaint   Leg Pain             Chief Complaint   Patient presents with    Leg Pain     B/L leg pain since 3am. Hx of MS. VS stable per EMS.       Initial Presentation: 74 y.o. male to ED by ems admitted observation d/t L leg pain with need for PT/OT.PMH of multiple sclerosis, neurogenic bladder, diastolic CHF, constipation, diabetic neuropathy, history of CVA, hyperlipidemia, hypertension, diabetes, GERSON.Exam consistent with LLE external rotation, adduction and limited ROM of hip with tenderness to touch.intermittently get dizzy. remains bedbound.Concerning for left hip fracture/dislocation or pelvic structural abnormality, orthopedics reviewed in formal x-ray of left hip. No fracture appreciated. They are recommending CT left extremity.CT on  admission does show right middle ear effusion, will start intranasal steroids.CTA head neck displaying questionable otomastoiditis. Patient is without otalgia, postauricular tenderness, edema or erythema. No excessive protrusion of right auricle.WBC 12.61.Consider pain also in setting of radiculopathy versus painful muscle spasm related to underlying structural concern.Tylenol, lidocaine patch. Baclofen.Valium.gabapentin. PT/OT.     Left leg pain  Assessment & Plan  Patient with past medical history of multiple sclerosis with neurogenic bladder and chronic SPT tube (primarily bed bound), diastolic CHF, CVA, diabetic neuropathy, hypertension, diabetes, GERSON presents with worsening bilateral leg pain, L>R since yesterday PM.   Neurology evaluated bedside.  Patient has no new focal deficits/red flag symptoms.  No indications for steroid treatment  Exam consistent with LLE external rotation, adduction and limited ROM of hip with tenderness to touch  Leg neurovascularly intact.  No clinical concern for DVT  Patient denies any acute trauma.  He does note working with therapy at home yesterday but remains bedbound.  Concerning for left hip fracture/dislocation or pelvic structural abnormality, orthopedics reviewed in formal x-ray of left hip. No fracture appreciated. They are recommending CT left extremity  Consider pain also in setting of radiculopathy versus painful muscle spasm related to underlying structural concern.  Will schedule Tylenol, offer lidocaine patch.  Neurology increased patient's baclofen from 5 mg 3 times daily to 10 mg 3 times daily.  Valium has been added can be used sparingly for muscle spasms.  Continue home gabapentin.  Would be cautious with opioids due to multiple sedating medications.  Would remain nonweightbearing until imaging  Hold off on formal orthopedic consult until imaging results  PT OT consult  CM consult as patient just recently was discharged with home health rehab  Patient lives at  home with his brother and sister.  Attempted to update sister via telephone as she manages his meds but she currently has laryngitis and cannot talk.  Discussed with brother who is aware he is here.     Dizziness  Assessment & Plan  With recent admission of vertiginous symptoms  Continue meclizine as needed  Patient notes he will still intermittently get dizzy  CT on admission does show right middle ear effusion, will start intranasal steroids  Ultimately patient would benefit from outpatient ENT evaluation  Patient did have recent CTA which displayed left internal carotid artery stenosis and was referred to vascular surgery     Multiple sclerosis ([facility])  Assessment & Plan  Patient is bedbound at baseline  Baclofen has been increased  No concerns for acute flare  PT/OT consults     Type 2 diabetes mellitus without complication, without long-term current use of insulin ([facility])  Assessment & Plan        Lab Results   Component Value Date     HGBA1C 8.0 (H) 06/16/2024      Hold home oral hypoglycemics  Placed on sliding scale insulin with Accu-Cheks, diabetic diet     Primary hypertension  Assessment & Plan  Elevated on admission likely secondary to discomfort  Improving  Continue Norvasc with hold parameters  Patient on Inderal with history of essential tremor     Leukocytosis  Assessment & Plan  Unclear etiology  Recently treated for UTI. No suprapubic pain, urine draining clearly. No fevers/chills.   No URI complaints, follow up formal CXR  Viral panel negative  CTA head neck displaying questionable otomastoiditis.  Patient is without otalgia, postauricular tenderness, edema or erythema.  No excessive protrusion of right auricle. Monitor.   CBC in AM     Constipation  Assessment & Plan  Continue home bowel regimen with MiraLAX and Senokot, monitor output     Chronic diastolic congestive heart failure (HCC)  Assessment & Plan      Wt Readings from Last 3 Encounters:   08/31/24 71.3 kg (157 lb 3 oz)   07/26/24 69.2 kg  (152 lb 8.9 oz)   07/20/24 81.3 kg (179 lb 3.7 oz)   Last echo 2018 LVEF 65%, grade 1 diastolic dysfunction  BNP normal on on admission  Clinically euvolemic on exam, not on diuretics PTA           History of CVA (cerebrovascular accident)  Assessment & Plan  History of left hemisphere lacunar infarct in 2018  Continue Plavix and atorvastatin for secondary stroke prevention     Obstructive sleep apnea  Assessment & Plan  CPAP qhs     Hyperlipidemia  Assessment & Plan  Continue statin, CK normal     Esophageal reflux  Assessment & Plan  Continue PPI  Patient passed nursing bedside swallow eval.  Recently evaluated by speech therapy and was recommended for regular diet and thins     Neurogenic bladder  Assessment & Plan  Recent admission urology exchange SPT tube  Catheter draining well, no pain  Continue urogenital care     Diabetic neuropathy (HCC)  Assessment & Plan  Patient is on gabapentin 300 mg twice daily and 600 mg qhs  Monitor with other sedating medications  Anticipated Length of Stay/Certification Statement:  Patient will be admitted on an inpatient basis with an anticipated length of stay of greater than 2 midnights secondary to pt/ot eval, left leg pain.       8/31 NEUROLOGY CONSULT: MS. suprapubic Cage related cystitis/UTI.  bilateral lower extremity pain and muscle spasm upon moving his lower extremity and 3 am this morning.  established diagnosis of multiple sclerosis since 1960s with prior imaging consistent with an longitudinally extensive transverse myelitis T3-T5/T6, patient has never been on disease modifying regimen for MS.   NMO completed prior to this admission was negative.history of stroke and brain aneurysm status post coiling as patient has been taking Plavix 75 mg.  Brain imaging completed in July 2024 with no acute intracranial abnormality. Status post placement of basilar tip aneurysm stent coiling.   CT Angiography: 50-69% stenosis at the proximal left internal carotid artery. Stable  mild to moderate focal stenosis at the left inferior M2 division and right P2 segment.   MRI brain 2018: There is a 1.2 cm focal area of diffusion restriction adjacent to the posterior body of the left lateral ventricle in the region of the posterior corona radiata without associated contrast enhancement compatible with acute lacunar infarct.   Moderate periventricular and white matter T2 hyperintensities noted, unchanged from the previous study of September 19, 2016.dvised against steroid treatment.  - exam consistent with LLE adduction and pain in the groin, with the pain radiating along interior thigh. ANY movement in his left leg trigger pain- concern left hip dislocation/fracture or other structural abnormality, not related to MS or related disability as RLE is paralyzed and no significant pain noted as it is in normal physiologic position.   -Patient will be advised to consider lumbar x-ray to rule out additional pathology even if patient has thoracic spine demyelination T3-T5/T6 with cord atrophy suggestive of secondary progressive MS. acutely developing sharp pain in bilateral lower extremity could be due to radiculopathy versus painful muscle spasm related to underlying send examination versus peripheral tibial disease but patient has preserved pulses.  -Patient has remote history of ischemic stroke as he is to continue Plavix 75 mg; repeating CTA H/N and CTH - clinical presentation might involve brainstem/upper cervical spine, r/o vertebral dissection and related pathology advised.   -Patient is to continue gabapentin 300 mg in the morning 300 at noon and 600 mg at night  -Patient takes baclofen 5 mg 3 times daily as he can safely increase to 10 mg 3 times daily-patient was given Valium 2 mg while in the ED.  -Patient is to establish with outpatient physiatry team for further management of spasticity          9/1: UPGRADED TO INPT. left lower leg pain is better.  He is alert to person and place.  Patient  "reports he feels more tired today and \"out of it \".  He does note his appetite is good.  We discussed decreasing his medications due to him feeling \"out of it\".  He also notes he is having some gassy stomach pain.  He is passing gas and eating without vomiting.  No bowel movement yet. Tylenol, lidocaine patch. Baclofen.Valium.gabapentin. PT/OT.    ED Triage Vitals [08/31/24 0614]   Temperature Pulse Respirations Blood Pressure SpO2 Pain Score   98.2 °F (36.8 °C) 98 18 161/76 96 % 7     Weight (last 2 days)       Date/Time Weight    08/31/24 0614 71.3 (157.19)            Vital Signs (last 3 days)       Date/Time Temp Pulse Resp BP MAP (mmHg) SpO2 O2 Device Patient Position - Orthostatic VS Harvey Coma Scale Score Pain    09/01/24 0713 98 °F (36.7 °C) 74 16 111/58 71 96 % None (Room air) Lying -- --    08/31/24 2316 97.4 °F (36.3 °C) 75 16 106/61 67 95 % None (Room air) Lying -- --    08/31/24 2000 -- -- -- -- -- -- -- -- 15 3    08/31/24 1517 97.9 °F (36.6 °C) 101 18 172/84 -- 96 % None (Room air) Lying -- --    08/31/24 1343 -- -- -- -- -- -- -- -- -- 7    08/31/24 1248 97.4 °F (36.3 °C) 102 18 161/81 96 98 % None (Room air) Lying -- --    08/31/24 1130 -- 105 18 150/76 108 98 % None (Room air) Lying -- --    08/31/24 1100 -- 98 16 144/67 96 97 % None (Room air) Lying 15 No Pain    08/31/24 1030 -- 102 18 160/71 102 97 % None (Room air) Lying -- --    08/31/24 1000 -- 100 18 171/81 114 97 % None (Room air) Lying -- --    08/31/24 0915 -- 97 18 -- -- 97 % None (Room air) Lying -- 5    08/31/24 0614 98.2 °F (36.8 °C) 98 18 161/76 -- 96 % None (Room air) Lying -- 7              Pertinent Labs/Diagnostic Test Results:   Radiology:  CT lower extremity wo contrast left   Final Interpretation by Bib Germain MD (09/01 0759)      Severe left hip degenerative changes. No fracture.         Workstation performed: [id number]         XR spine lumbar minimum 4 views non injury   Final Interpretation by Jordy Montoya " MD Casey (09/01 1253)      No acute osseous abnormality.      Degenerative changes as described.         Computerized Assisted Algorithm (CAA) may have been used to analyze all applicable images.         Workstation performed: [id number]         XR hips bilateral 3-4 vw w pelvis if performed   Final Interpretation by Jordy Peña MD (09/01 0936)      No acute osseous abnormality.      Degenerative changes as described.         Computerized Assisted Algorithm (CAA) may have been used to analyze all applicable images.            Workstation performed: [id number]         CTA head and neck with and without contrast   Final Interpretation by Tez Hodge MD (08/31 1054)      CT Brain:      1.  No acute intracranial CT abnormality.   2.  Stable mild nonspecific white matter change may indicate combination of patient's known demyelinating disease and microangiopathy.   3.  Bilateral underpneumatized mastoid air cells effusion as well as right middle ear effusion. Correlate for otomastoiditis.      CT Angiography:      1.  Patent major vessels of the [place] without high-grade stenosis.   2.  Redemonstrated stent assisted basilar artery tip aneurysm coiling. Significant streak artifact from the coil materials limits reliable assessment for residual or recurrent aneurysm.   3.  About 55-60% atherosclerotic stenosis in the left ICA origin. No significant stenosis in the right cervical carotid or bilateral vertebral arteries.                     Workstation performed: [id number]         XR chest 2 views   ED Interpretation by Xavier Diaz MD (08/31 0829)   I have reviewed the film, per my independent interpretation : No vascular congestion, no infiltrate/pneumonia, no effusion.  Formal read per radiology        Final Interpretation by Jordy Peña MD (09/01 1253)      No acute cardiopulmonary disease.            Workstation performed: [id number]           Cardiology:  ECG 12 lead    by  Interface, Ris Results In (08/31 0732)      Date/Time: 8/31/2024 7:39 AM     Performed by: Xavier Diaz MD   Authorized by: Xavier Diaz MD    Indications / Diagnosis:  Dyspnea   ECG reviewed by me, the ED Provider: yes    Patient location:  ED   Previous ECG:     Comparison to cardiac monitor: Yes    Interpretation:     Interpretation: normal    Rate:     ECG rate:  100     ECG rate assessment: normal    Rhythm:     Rhythm: sinus rhythm    Ectopy:     Ectopy: none    QRS:     QRS axis:  Normal   Conduction:     Conduction: normal    ST segments:     ST segments:  Normal   T waves:     T waves: normal     GI:  No orders to display       Results from last 7 days   Lab Units 08/31/24  0742   SARS-COV-2  Negative     Results from last 7 days   Lab Units 09/01/24  0457 08/31/24  0742   WBC Thousand/uL 9.75 12.61*   HEMOGLOBIN g/dL 13.3 14.8   HEMATOCRIT % 40.9 45.6   PLATELETS Thousands/uL 322 348   TOTAL NEUT ABS Thousands/µL  --  8.35*         Results from last 7 days   Lab Units 09/01/24 0457 08/31/24  0742   SODIUM mmol/L 135 137   POTASSIUM mmol/L 3.5 4.2   CHLORIDE mmol/L 103 106   CO2 mmol/L 22 23   ANION GAP mmol/L 10 8   BUN mg/dL 18 18   CREATININE mg/dL 1.05 0.92   EGFR ml/min/1.73sq m 69 81   CALCIUM mg/dL 9.3 9.3   MAGNESIUM mg/dL  --  1.9     Results from last 7 days   Lab Units 08/31/24  0742   AST U/L 18   ALT U/L 11   ALK PHOS U/L 97   TOTAL PROTEIN g/dL 7.5   ALBUMIN g/dL 3.8   TOTAL BILIRUBIN mg/dL 0.41     Results from last 7 days   Lab Units 09/01/24  1106 09/01/24  0608 08/31/24 2029 08/31/24  1608   POC GLUCOSE mg/dl 149* 145* 183* 154*     Results from last 7 days   Lab Units 09/01/24  0457 08/31/24  0742   GLUCOSE RANDOM mg/dL 141* 199*       Results from last 7 days   Lab Units 08/31/24  0742   CK TOTAL U/L 61     Results from last 7 days   Lab Units 08/31/24  0822   HS TNI 0HR ng/L 8         Results from last 7 days   Lab Units 08/31/24  0742   PROTIME seconds  12.4   INR  0.90   PTT seconds 28     Results from last 7 days   Lab Units 08/31/24  0742   TSH 3RD GENERATON uIU/mL 1.389                     Results from last 7 days   Lab Units 08/31/24  0742   BNP pg/mL 58       Results from last 7 days   Lab Units 08/31/24  0742   INFLUENZA A PCR  Negative   INFLUENZA B PCR  Negative   RSV PCR  Negative       ED Treatment-Medication Administration from 08/31/2024 0610 to 08/31/2024 1225         Date/Time Order Dose Route Action     08/31/2024 0740 diazepam (VALIUM) injection 2 mg 2 mg Intravenous Given                     Past Medical History:   Diagnosis Date    Acute laryngitis     Acute nonsuppurative otitis media, unspecified laterality     Arm weakness     Arthritis     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HCC)     Bladder infection     Bronchitis     Constipation     Cough     Diabetes (HCC)     Diabetes mellitus (HCC)     Dizziness     Dysfunction of eustachian tube     Erectile dysfunction of non-organic origin     Fatigue     Glaucoma     Hiatal hernia     Hypertension     Imbalance     Leg muscle spasm     Leukocytosis 04/01/2024    MS (multiple sclerosis) (HCC)     Multiple sclerosis (HCC)     Nephrolithiasis     Neurogenic bladder     No natural teeth     Sinus pain     Spinal stenosis     Strain of thoracic region     Stroke (HCC)     Suprapubic catheter (HCC)      Present on Admission:   Neurogenic bladder   Type 2 diabetes mellitus without complication, without long-term current use of insulin (HCC)   Chronic diastolic congestive heart failure (HCC)   Diabetic neuropathy (HCC)   Multiple sclerosis (HCC)   Primary hypertension   Obstructive sleep apnea   (Resolved) Leukocytosis   Esophageal reflux   Hyperlipidemia   Dizziness   Constipation   Excessive daytime sleepiness   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HCC)      Admitting Diagnosis: Abnormal neurological exam [R29.90]  Age/Sex: 74 y.o. male  Admission Orders:  Scheduled Medications:  acetaminophen, 975 mg, Oral, Q8H  CIERA  amLODIPine, 10 mg, Oral, Daily   And  atorvastatin, 80 mg, Oral, Daily  baclofen, 10 mg, Oral, TID  budesonide-formoterol, 2 puff, Inhalation, BID  clopidogrel, 75 mg, Oral, Daily  cyanocobalamin, 500 mcg, Oral, Daily  famotidine, 20 mg, Oral, BID AC  fluticasone, 2 spray, Each Nare, Daily  gabapentin, 300 mg, Oral, BID  gabapentin, 600 mg, Oral, HS  heparin (porcine), 5,000 Units, Subcutaneous, Q8H CIERA  insulin lispro, 1-5 Units, Subcutaneous, TID AC  insulin lispro, 1-5 Units, Subcutaneous, HS  latanoprost, 1 drop, Both Eyes, HS  lidocaine, 1 patch, Topical, Daily  mirtazapine, 7.5 mg, Oral, HS  pantoprazole, 40 mg, Oral, Early Morning  polyethylene glycol, 17 g, Oral, BID  propranolol, 60 mg, Oral, Daily  senna-docusate sodium, 2 tablet, Oral, HS        PRN Meds:  albuterol, 2.5 mg, Nebulization, Q6H PRN  aluminum-magnesium hydroxide-simethicone, 30 mL, Oral, Q4H PRN  bisacodyl, 10 mg, Rectal, Daily PRN  diazepam, 2.5 mg, Intramuscular, Q8H PRN  meclizine, 12.5 mg, Oral, Q8H PRN  melatonin, 3 mg, Oral, HS PRN  naloxone, 0.04 mg, Intravenous, Q1MIN PRN  ondansetron, 4 mg, Intravenous, Q6H PRN 8/31 X 1  oxyCODONE, 2.5 mg, Oral, Q6H PRN 8/31 X 1        IP CONSULT TO NEUROLOGY  IP CONSULT TO CASE MANAGEMENT    Network Utilization Review Department  ATTENTION: Please call with any questions or concerns to [phone] and carefully listen to the prompts so that you are directed to the right person. All voicemails are confidential.   For Discharge needs, contact Care Management DC Support Team at [phone] opt. 2  Send all requests for admission clinical reviews, approved or denied determinations and any other requests to dedicated fax number below belonging to the campus where the patient is receiving treatment. List of dedicated fax numbers for the Facilities:  FACILITY NAME UR FAX NUMBER   ADMISSION DENIALS (Administrative/Medical Necessity) [phone]   DISCHARGE SUPPORT TEAM (NETWORK) [phone]    PARENT CHILD HEALTH (Maternity/NICU/Pediatrics) [phone]   [facility] [phone]   [facility] [phone]   [facility] [phone]   [facility] [phone]   [facility] [phone]   [facility] [phone]   [facility] [phone]   [facility] [phone]   [facility] [phone]   [facility] [phone]   [facility] [phone]   [facility] [phone]

## 2024-09-01 NOTE — PROGRESS NOTES
[facility]  Progress Note  Name: Mathew Rico I  MRN: [id number]  Unit/Bed#: E2 -01 I Date of Admission: 8/31/2024   Date of Service: 9/1/2024 I Hospital Day: 0    Assessment & Plan   * Left leg pain  Assessment & Plan  Patient with past medical history of multiple sclerosis with neurogenic bladder and chronic SPT tube (primarily bed bound), diastolic CHF, CVA, diabetic neuropathy, hypertension, diabetes, GERSON presents with worsening bilateral leg pain, L>R since since 8/30 PM  Neurology evaluated bedside.  Patient has no new focal deficits/red flag symptoms.  No indications for steroid treatment  Exam consistent with LLE external rotation, adduction and limited ROM of hip with tenderness to touch  Leg neurovascularly intact.  No clinical concern for DVT  Patient denies any acute trauma.  He does note working with therapy at home PTA but remains bedbound.  No evidence of hip, lumbar spine or pelvic structural abnormality on x-ray and CT imaging  Pain in setting of worsening LE spasticity and contractures related to thoracic spine demyelination with cord atrophy secondary to progressive MS  Continue scheduled Tylenol, lidocaine patch.  Neurology increased patient's baclofen from 5 mg 3 times daily to 10 mg 3 times daily.  He is having more sedation with this today, will hold afternoon dose and reinitiate home dose Valium has been added can be used sparingly for extreme muscle muscle spasms.  Continue home gabapentin.  Defer any further opioids due to multiple sedating medications and age.  PT OT consult, patient has refused rehab in the past although suspect he would benefit from higher level of care.  He is living at home and being cared for by his brother and sister  CM consult as patient just recently was discharged with home health rehab  Patient needs to establish care with physiatry, could consider outpatient Botox    Dizziness  Assessment & Plan  With recent admission of  vertiginous symptoms  Continue meclizine as needed  Patient notes he will still intermittently get dizzy  CT on admission does show right middle ear effusion, started on intranasal steroids  Ultimately patient would benefit from outpatient ENT evaluation  Patient did have recent CTA which displayed left internal carotid artery stenosis and was referred to vascular surgery    Multiple sclerosis ([facility])  Assessment & Plan  Patient is bedbound at baseline  Continue home meds    Type 2 diabetes mellitus without complication, without long-term current use of insulin ([facility])  Assessment & Plan  Lab Results   Component Value Date    HGBA1C 8.0 (H) 06/16/2024     Hold home oral hypoglycemics  Continue sliding scale insulin with Accu-Cheks, diabetic diet    Primary hypertension  Assessment & Plan  Elevated on admission likely secondary to discomfort  Improved   Continue Norvasc with hold parameters  Patient on Inderal with history of essential tremor    Constipation  Assessment & Plan  Continue home bowel regimen with MiraLAX and Senokot, no BM here  Tolerating diet, abd soft  Give suppository today    Chronic diastolic congestive heart failure (HCC)  Assessment & Plan  Wt Readings from Last 3 Encounters:   08/31/24 71.3 kg (157 lb 3 oz)   07/26/24 69.2 kg (152 lb 8.9 oz)   07/20/24 81.3 kg (179 lb 3.7 oz)   Last echo 2018 LVEF 65%, grade 1 diastolic dysfunction  BNP normal on on admission  Clinically euvolemic on exam, not on diuretics PTA    History of CVA (cerebrovascular accident)  Assessment & Plan  History of left hemisphere lacunar infarct in 2018  Continue Plavix and atorvastatin for secondary stroke prevention    Obstructive sleep apnea  Assessment & Plan  CPAP qhs    Hyperlipidemia  Assessment & Plan  Continue statin, CK normal    Esophageal reflux  Assessment & Plan  Continue PPI  Patient passed nursing bedside swallow eval.  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[facility])  Assessment & Plan  CTA with redemonstrated stent assisted basilar artery tip aneurysm coiling  Also has about 55 to 60% atherosclerotic stenosis in the left ICA with no significant stenosis on the right and he was referred to vascular as an OP    Diabetic neuropathy ([facility])  Assessment & Plan  Patient is on gabapentin 300 mg twice daily and 600 mg qhs  Hold afternoon dose due to sedation with increased flexeril  Continue to monitor with mental status    Leukocytosis-resolved as of 9/1/2024  Assessment & Plan  Unclear etiology  Recently treated for UTI. No suprapubic pain, urine draining clearly. No fevers/chills.   No URI complaints, chest x-ray negative  Viral panel negative  CTA head neck displaying questionable otomastoiditis.  Patient is without otalgia, postauricular tenderness, edema or erythema.  No excessive protrusion of right auricle. Monitor.   Resolved      VTE Pharmacologic Prophylaxis:   Moderate Risk (Score 3-4) - Pharmacological DVT Prophylaxis Ordered: heparin.    Mobility:   Basic Mobility Inpatient Raw Score: 12  JH-HLM Goal: 4: Move to chair/commode  JH-HLM Achieved: 4: Move to chair/commode  JH-HLM Goal NOT achieved. Continue with multidisciplinary rounding and encourage appropriate mobility to improve upon JH-HLM goals.    Patient Centered Rounds: I performed bedside rounds with nursing staff today.   Discussions with Specialists or Other Care Team Provider: BETO neuro    Education and Discussions with Family / Patient: Updated  (brother) via phone.    Total Time Spent on Date of Encounter in care of patient: 30 mins. This time was spent on one or more of the following: performing physical exam; counseling and coordination of care; obtaining or reviewing history;  "documenting in the medical record; reviewing/ordering tests, medications or procedures; communicating with other healthcare professionals and discussing with patient's family/caregivers.    Current Length of Stay: 0 day(s)  Current Patient Status: Observation   Certification Statement: The patient will continue to require additional inpatient hospital stay due to pain, daytime fatigue  Discharge Plan: Anticipate discharge in 24-48 hrs to discharge location to be determined pending rehab evaluations.    Code Status: Level 1 - Full Code    Subjective:   Patient's left lower leg pain is better.  He is alert to person and place.  Patient reports he feels more tired today and \"out of it \".  He does note his appetite is good.  We discussed decreasing his medications due to him feeling \"out of it\".  He also notes he is having some gassy stomach pain.  He is passing gas and eating without vomiting.  No bowel movement yet.    Objective:     Vitals:   Temp (24hrs), Av.8 °F (36.6 °C), Min:97.4 °F (36.3 °C), Max:98 °F (36.7 °C)    Temp:  [97.4 °F (36.3 °C)-98 °F (36.7 °C)] 98 °F (36.7 °C)  HR:  [] 74  Resp:  [16-18] 16  BP: (106-172)/(58-84) 111/58  SpO2:  [95 %-96 %] 96 %  Body mass index is 26.16 kg/m².     Input and Output Summary (last 24 hours):     Intake/Output Summary (Last 24 hours) at 2024 1436  Last data filed at 2024  Gross per 24 hour   Intake --   Output 500 ml   Net -500 ml       Physical Exam:   Physical Exam  Vitals and nursing note reviewed.   Constitutional:       General: He is not in acute distress.     Appearance: He is well-developed. He is ill-appearing (chronically).   HENT:      Right Ear: External ear normal. No swelling or tenderness. No mastoid tenderness.      Left Ear: External ear normal. No swelling or tenderness. No mastoid tenderness.      Ears:      Comments: No change in exam from admission.   Eyes:      Comments: Right eye chronic esotropia    Cardiovascular:      " Rate and Rhythm: Normal rate and regular rhythm.   Pulmonary:      Effort: Pulmonary effort is normal. No respiratory distress.      Comments: Poor inspiratory effort, room air.  No accessory muscle use  Abdominal:      General: Bowel sounds are normal.      Palpations: Abdomen is soft.      Tenderness: There is no abdominal tenderness.      Comments: Spt tube draining clear yellow urine   Musculoskeletal:         General: Tenderness (left hip) present.      Right lower leg: No edema.      Left lower leg: No edema.      Comments: Whole right lower extremity nontender to palpation, bilateral calf compartments soft and nontender.  Light sensation intact to lower extremities/groin.  Right lower extremity with decreased strength which is baseline.  Left lower extremity tenderness on palpation to left anterior hip.  Left leg less externally rotated and shortened.  No clonus. DP pulses intact.  Lower extremity warm to touch.  Limited range of motion of left hip.  Patient is able to extend left knee.   Skin:     General: Skin is warm and dry.   Neurological:      Mental Status: He is alert.      Comments: Alert to person, place. Intermittently forgetful.  Fatigued. No dysarthria, aphasia    Psychiatric:         Mood and Affect: Mood is anxious.          Additional Data:     Labs:  Results from last 7 days   Lab Units 09/01/24  0457 08/31/24  0742   WBC Thousand/uL 9.75 12.61*   HEMOGLOBIN g/dL 13.3 14.8   HEMATOCRIT % 40.9 45.6   PLATELETS Thousands/uL 322 348   SEGS PCT %  --  66   LYMPHO PCT %  --  20   MONO PCT %  --  7   EOS PCT %  --  6     Results from last 7 days   Lab Units 09/01/24  0457 08/31/24  0742   SODIUM mmol/L 135 137   POTASSIUM mmol/L 3.5 4.2   CHLORIDE mmol/L 103 106   CO2 mmol/L 22 23   BUN mg/dL 18 18   CREATININE mg/dL 1.05 0.92   ANION GAP mmol/L 10 8   CALCIUM mg/dL 9.3 9.3   ALBUMIN g/dL  --  3.8   TOTAL BILIRUBIN mg/dL  --  0.41   ALK PHOS U/L  --  97   ALT U/L  --  11   AST U/L  --  18   GLUCOSE  RANDOM mg/dL 141* 199*     Results from last 7 days   Lab Units 08/31/24  0742   INR  0.90     Results from last 7 days   Lab Units 09/01/24  1106 09/01/24  0608 08/31/24 2029 08/31/24  1608   POC GLUCOSE mg/dl 149* 145* 183* 154*     Lines/Drains:  Invasive Devices       Peripheral Intravenous Line  Duration             Peripheral IV 08/31/24 Dorsal (posterior);Left Hand 1 day    Peripheral IV 08/31/24 Dorsal (posterior);Right Forearm 1 day              Drain  Duration             Suprapubic Catheter 24 Fr. 78 days                  Imaging: Reviewed radiology reports from this admission including: chest xray, xray(s), and CT leg    Recent Cultures (last 7 days):     Last 24 Hours Medication List:   Current Facility-Administered Medications   Medication Dose Route Frequency Provider Last Rate    acetaminophen  975 mg Oral Q8H [facility] Seng Daniel PA-C      albuterol  2.5 mg Nebulization Q6H PRN Seng Daniel PA-C      aluminum-magnesium hydroxide-simethicone  30 mL Oral Q4H PRN Blanca Morgan MD      amLODIPine  10 mg Oral Daily Seng Daniel PA-C      And    atorvastatin  80 mg Oral Daily Seng Daniel PA-C      baclofen  5 mg Oral TID Seng Daniel PA-C      bisacodyl  10 mg Rectal Daily PRN Seng Daniel PA-C      budesonide-formoterol  2 puff Inhalation BID Seng Daniel PA-C      clopidogrel  75 mg Oral Daily Seng Daniel PA-C      cyanocobalamin  500 mcg Oral Daily Seng Daniel PA-C      diazepam  2.5 mg Intramuscular Q8H PRN Seng Daniel PA-C      famotidine  20 mg Oral BID AC Blanca Morgan MD      fluticasone  2 spray Each Nare Daily Seng Daniel PA-C      [START ON 9/2/2024] gabapentin  300 mg Oral BID Seng Daniel PA-C      gabapentin  600 mg Oral HS Seng Daniel PA-C      heparin (porcine)  5,000 Units Subcutaneous Q8H [facility] Seng Daniel PA-C      insulin lispro  1-5 Units Subcutaneous TID AC Seng Daniel PA-C      insulin lispro  1-5 Units Subcutaneous HS Seng Daniel PA-C       latanoprost  1 drop Both Eyes HS Seng Daniel PA-C      lidocaine  1 patch Topical Daily Seng Daniel PA-C      meclizine  12.5 mg Oral Q8H PRN Seng Daniel PA-C      melatonin  3 mg Oral HS PRN Seng Daniel PA-C      mirtazapine  7.5 mg Oral HS Seng Daniel PA-C      naloxone  0.04 mg Intravenous Q1MIN PRN Seng Daniel PA-C      ondansetron  4 mg Intravenous Q6H PRN Blanca Morgan MD      pantoprazole  40 mg Oral Early Morning Seng Daniel PA-C      polyethylene glycol  17 g Oral BID Seng Daniel PA-C      propranolol  60 mg Oral Daily Seng Daniel PA-C      senna-docusate sodium  2 tablet Oral HS Seng Daniel PA-C          Today, Patient Was Seen By: Seng Daniel PA-C    **Please Note: This note may have been constructed using a voice recognition system.**

## 2024-09-01 NOTE — PROGRESS NOTES
Progress Note - Neurology   Mathew Rico 74 y.o. male MRN: [id number]  Unit/Bed#: E2 -01 Encounter: [id number]      Assessment & Plan     Multiple sclerosis (HCC)  Assessment & Plan  Mr. Rico has presented to [facility] for evaluation of bilateral lower extremity pain and muscle spasm upon moving his lower extremity and 3 am this morning.  Patient reported no signs of new focal sensorimotor dysfunction, no lower back pain, no headache, no facial symmetry, no upper extremity sensorimotor deficit.  Patient has established diagnosis of multiple sclerosis since 1960s with prior imaging consistent with an longitudinally extensive transverse myelitis T3-T5/T6, patient has never been on disease modifying regimen for MS.    CT LLE with severe OA in left hip with no signs of fracture/dislocation.      PLAN:  - Patient will be advised against steroid treatment.  - Patient has never been on DMT for MS, no need to initiate one due to immunosenescence and related concerns.   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  -Patient has remote history of ischemic stroke as he is to continue Plavix 75 mg; repeating CTA H/N and CTH - clinical presentation might involve brainstem/upper cervical spine, r/o vertebral dissection and related pathology advised.   -Patient is to continue gabapentin 300 mg in the morning 300 at noon and 600 mg at night  -Patient continue baclofen 10 mg 3 times daily-patient may benefit from Valium 2.5 mg PRN q 8 hours if severe muscle spasms.  -Patient is to establish with outpatient physiatry team for further management of spasticity and related concerns.    Patient is cleared for discharge with close follow-up with outpatient neurology will be required.    * Left leg pain  Assessment & Plan  Patient presented with a chief  complaint of positional left hip/groin pain  - left hip x-ray with no fracture or dislocation  - CT LLE with severe degenerative changes in left hip  - LLE has full strength as at baseline and ROM limited due to pain   - evaluation by Ortho for discharge planning  - establishing with outpatient Physiatry team advised for worsening spasticity and developing LE contracture related to advanced MS          Mathew Rico will need follow up in in 6 weeks with multiple sclerosis attending or advance practitioner. He will not require outpatient neurological testing.    Subjective:   Mr. Rico described no new sensorimotor dysfunction, no facial asymmetry, no double vision or dysarthria/dysphagia.  Patient continue describing pain in his hip and groin upon moving his left leg.  Patient does not have any recent injury, patient stated when he is not moving, no pain noted.  No signs of erythema or swelling in hip or calf to bring concern for DVT.    CT LLE : Severe left hip degenerative changes. No fracture.   Left hip x-ray - no fracture/no dislocation    CTH/CTA 8/31/2024: CT Brain:     1.  No acute intracranial CT abnormality.  2.  Stable mild nonspecific white matter change may indicate combination of patient's known demyelinating disease and microangiopathy.  3.  Bilateral underpneumatized mastoid air cells effusion as well as right middle ear effusion. Correlate for otomastoiditis.     CT Angiography:     1.  Patent major vessels of the [place] without high-grade stenosis.  2.  Redemonstrated stent assisted basilar artery tip aneurysm coiling. Significant streak artifact from the coil materials limits reliable assessment for residual or recurrent aneurysm.  3.  About 55-60% atherosclerotic stenosis in the left ICA origin. No significant stenosis in the right cervical carotid or bilateral vertebral arteries.  ROS:  Neurological ROS: positive for - visual changes and weakness  paralysis    Vitals: Blood  pressure 111/58, pulse 74, temperature 98 °F (36.7 °C), temperature source Temporal, resp. rate 16, weight 71.3 kg (157 lb 3 oz), SpO2 96%.,Body mass index is 26.16 kg/m².    Physical Exam:   CONSTITUTIONAL: NAD, pleasant. NECK: supple, no lymphadenopathy, no thyromegaly, no JVD. CARDIOVASCULAR: RRR, normal S1S2, no murmurs, no rubs. RESP: clear to auscultation bilaterally, no wheezes/rhonchi/rales. ABDOMEN: soft, non tender, non distended. SKIN: no rash or skin lesions. EXTREMITIES: no edema, pulses 2+bilaterally. PSYCH: appropriate mood and affect  NEUROLOGIC COMPREHENSIVE EXAM: Patient is oriented to person, place and time, NAD; appropriate affect. CN II, III, IV, V, VI, VII,VIII,IX,X,XI-XII intact with EOMI, PERRLA, OKN intact, VF grossly intact, fundi poorly visualized secondary to pupillary constriction; symmetric face noted. Motor: 5/5 UE bilateral symmetric, RLE 2/5 hip flexuion and 3-/5 knee extension 2/5 dorsiflexion; 4-/5 left hip flexion and 5/5 knee extension and dorsiflexion; limited ROM LLE due to groin pain radiating anterior thigh ;Sensory: intact to light touch and pinprick bilaterally; abnormal vibration sensation feet bilaterally; Coordination within normal limits on FTN and WILLIAM testing; DTR: 3+/4 through, sustained clonus LLE; no Babinski.    Lab, Imaging and other studies: I have personally reviewed pertinent reports.  , CBC:   Results from last 7 days   Lab Units 09/01/24  0457 08/31/24  0742   WBC Thousand/uL 9.75 12.61*   RBC Million/uL 4.78 5.44   HEMOGLOBIN g/dL 13.3 14.8   HEMATOCRIT % 40.9 45.6   MCV fL 86 84   PLATELETS Thousands/uL 322 348   , BMP/CMP:   Results from last 7 days   Lab Units 09/01/24  0457 08/31/24  0742   SODIUM mmol/L 135 137   POTASSIUM mmol/L 3.5 4.2   CHLORIDE mmol/L 103 106   CO2 mmol/L 22 23   BUN mg/dL 18 18   CREATININE mg/dL 1.05 0.92   CALCIUM mg/dL 9.3 9.3   AST U/L  --  18   ALT U/L  --  11   ALK PHOS U/L  --  97   EGFR ml/min/1.73sq m 69 81   , Vitamin  "B12:   , HgBA1C:   , TSH:   Results from last 7 days   Lab Units 08/31/24  0742   TSH 3RD GENERATON uIU/mL 1.389   , Coagulation:   Results from last 7 days   Lab Units 08/31/24  0742   INR  0.90   , Lipid Profile:   , Ammonia:   , Urinalysis:       Invalid input(s): \"URIBILINOGEN\", Drug Screen:   , Medication Drug Levels:       Invalid input(s): \"CARBAMAZEPINE\", \"OXCARBAZEPINE\"  VTE Prophylaxis: Sequential compression device (Venodyne)  and Heparin    Counseling / Coordination of Care  Total time spent today 15 minutes. Greater than 50% of total time was spent with the patient and / or family counseling and / or coordination of care. A description of the counseling / coordination of care: Discussing discharge planning and further plan of care in outpatient settings.  "

## 2024-09-01 NOTE — PLAN OF CARE
Problem: Prexisting or High Potential for Compromised Skin Integrity  Goal: Skin integrity is maintained or improved  Description: INTERVENTIONS:  - Identify patients at risk for skin breakdown  - Assess and monitor skin integrity  - Assess and monitor nutrition and hydration status  - Monitor labs   - Assess for incontinence   - Turn and reposition patient  - Assist with mobility/ambulation  - Relieve pressure over bony prominences  - Avoid friction and shearing  - Provide appropriate hygiene as needed including keeping skin clean and dry  - Evaluate need for skin moisturizer/barrier cream  - Collaborate with interdisciplinary team   - Patient/family teaching  - Consider wound care consult   Outcome: Progressing     Problem: PAIN - ADULT  Goal: Verbalizes/displays adequate comfort level or baseline comfort level  Description: Interventions:  - Encourage patient to monitor pain and request assistance  - Assess pain using appropriate pain scale  - Administer analgesics based on type and severity of pain and evaluate response  - Implement non-pharmacological measures as appropriate and evaluate response  - Consider cultural and social influences on pain and pain management  - Notify physician/advanced practitioner if interventions unsuccessful or patient reports new pain  Outcome: Progressing     Problem: INFECTION - ADULT  Goal: Absence or prevention of progression during hospitalization  Description: INTERVENTIONS:  - Assess and monitor for signs and symptoms of infection  - Monitor lab/diagnostic results  - Monitor all insertion sites, i.e. indwelling lines, tubes, and drains  - Monitor endotracheal if appropriate and nasal secretions for changes in amount and color  - [place] appropriate cooling/warming therapies per order  - Administer medications as ordered  - Instruct and encourage patient and family to use good hand hygiene technique  - Identify and instruct in appropriate isolation precautions for  identified infection/condition  Outcome: Progressing  Goal: Absence of fever/infection during neutropenic period  Description: INTERVENTIONS:  - Monitor WBC    Outcome: Progressing     Problem: SAFETY ADULT  Goal: Patient will remain free of falls  Description: INTERVENTIONS:  - Educate patient/family on patient safety including physical limitations  - Instruct patient to call for assistance with activity   - Consult OT/PT to assist with strengthening/mobility   - Keep Call bell within reach  - Keep bed low and locked with side rails adjusted as appropriate  - Keep care items and personal belongings within reach  - Initiate and maintain comfort rounds  - Make Fall Risk Sign visible to staff  - Offer Toileting every 2 Hours, in advance of need  - Initiate/Maintain bed alarm  - Obtain necessary fall risk management equipment  - Apply yellow socks and bracelet for high fall risk patients  - Consider moving patient to room near nurses station  Outcome: Progressing  Goal: Maintain or return to baseline ADL function  Description: INTERVENTIONS:  -  Assess patient's ability to carry out ADLs; assess patient's baseline for ADL function and identify physical deficits which impact ability to perform ADLs (bathing, care of mouth/teeth, toileting, grooming, dressing, etc.)  - Assess/evaluate cause of self-care deficits   - Assess range of motion  - Assess patient's mobility; develop plan if impaired  - Assess patient's need for assistive devices and provide as appropriate  - Encourage maximum independence but intervene and supervise when necessary  - Involve family in performance of ADLs  - Assess for home care needs following discharge   - Consider OT consult to assist with ADL evaluation and planning for discharge  - Provide patient education as appropriate  Outcome: Progressing  Goal: Maintains/Returns to pre admission functional level  Description: INTERVENTIONS:  - Perform AM-PAC 6 Click Basic Mobility/ Daily Activity  assessment daily.  - Set and communicate daily mobility goal to care team and patient/family/caregiver.   - Collaborate with rehabilitation services on mobility goals if consulted  - Perform Range of Motion 2 times a day.  - Reposition patient every 2 hours.  - Dangle patient 2 times a day  - Stand patient 2 times a day  - Ambulate patient 2 times a day  - Out of bed to chair 2 times a day   - Out of bed for meals 2 times a day  - Out of bed for toileting  - Record patient progress and toleration of activity level   Outcome: Progressing     Problem: DISCHARGE PLANNING  Goal: Discharge to home or other facility with appropriate resources  Description: INTERVENTIONS:  - Identify barriers to discharge w/patient and caregiver  - Arrange for needed discharge resources and transportation as appropriate  - Identify discharge learning needs (meds, wound care, etc.)  - Arrange for interpretive services to assist at discharge as needed  - Refer to Case Management Department for coordinating discharge planning if the patient needs post-hospital services based on physician/advanced practitioner order or complex needs related to functional status, cognitive ability, or social support system  Outcome: Progressing     Problem: Knowledge Deficit  Goal: Patient/family/caregiver demonstrates understanding of disease process, treatment plan, medications, and discharge instructions  Description: Complete learning assessment and assess knowledge base.  Interventions:  - Provide teaching at level of understanding  - Provide teaching via preferred learning methods  Outcome: Progressing     Problem: Nutrition/Hydration-ADULT  Goal: Nutrient/Hydration intake appropriate for improving, restoring or maintaining nutritional needs  Description: Monitor and assess patient's nutrition/hydration status for malnutrition. Collaborate with interdisciplinary team and initiate plan and interventions as ordered.  Monitor patient's weight and dietary  intake as ordered or per policy. Utilize nutrition screening tool and intervene as necessary. Determine patient's food preferences and provide high-protein, high-caloric foods as appropriate.     INTERVENTIONS:  - Monitor oral intake, urinary output, labs, and treatment plans  - Assess nutrition and hydration status and recommend course of action  - Evaluate amount of meals eaten  - Assist patient with eating if necessary   - Allow adequate time for meals  - Recommend/ encourage appropriate diets, oral nutritional supplements, and vitamin/mineral supplements  - Order, calculate, and assess calorie counts as needed  - Recommend, monitor, and adjust tube feedings and TPN/PPN based on assessed needs  - Assess need for intravenous fluids  - Provide specific nutrition/hydration education as appropriate  - Include patient/family/caregiver in decisions related to nutrition  Outcome: Progressing     Problem: GENITOURINARY - ADULT  Goal: Maintains or returns to baseline urinary function  Description: INTERVENTIONS:  - Assess urinary function  - Encourage oral fluids to ensure adequate hydration if ordered  - Administer IV fluids as ordered to ensure adequate hydration  - Administer ordered medications as needed  - Offer frequent toileting  - Follow urinary retention protocol if ordered  Outcome: Progressing  Goal: Absence of urinary retention  Description: INTERVENTIONS:  - Assess patient’s ability to void and empty bladder  - Monitor I/O  - Bladder scan as needed  - Discuss with physician/AP medications to alleviate retention as needed  - Discuss catheterization for long term situations as appropriate  Outcome: Progressing  Goal: Urinary catheter remains patent  Description: INTERVENTIONS:  - Assess patency of urinary catheter  - If patient has a chronic dela cruz, consider changing catheter if non-functioning  - Follow guidelines for intermittent irrigation of non-functioning urinary catheter  Outcome: Progressing     Problem:  METABOLIC, FLUID AND ELECTROLYTES - ADULT  Goal: Electrolytes maintained within normal limits  Description: INTERVENTIONS:  - Monitor labs and assess patient for signs and symptoms of electrolyte imbalances  - Administer electrolyte replacement as ordered  - Monitor response to electrolyte replacements, including repeat lab results as appropriate  - Instruct patient on fluid and nutrition as appropriate  Outcome: Progressing  Goal: Fluid balance maintained  Description: INTERVENTIONS:  - Monitor labs   - Monitor I/O and WT  - Instruct patient on fluid and nutrition as appropriate  - Assess for signs & symptoms of volume excess or deficit  Outcome: Progressing  Goal: Glucose maintained within target range  Description: INTERVENTIONS:  - Monitor Blood Glucose as ordered  - Assess for signs and symptoms of hyperglycemia and hypoglycemia  - Administer ordered medications to maintain glucose within target range  - Assess nutritional intake and initiate nutrition service referral as needed  Outcome: Progressing    Problem: MUSCULOSKELETAL - ADULT  Goal: Maintain or return mobility to safest level of function  Description: INTERVENTIONS:  - Assess patient's ability to carry out ADLs; assess patient's baseline for ADL function and identify physical deficits which impact ability to perform ADLs (bathing, care of mouth/teeth, toileting, grooming, dressing, etc.)  - Assess/evaluate cause of self-care deficits   - Assess range of motion  - Assess patient's mobility  - Assess patient's need for assistive devices and provide as appropriate  - Encourage maximum independence but intervene and supervise when necessary  - Involve family in performance of ADLs  - Assess for home care needs following discharge   - Consider OT consult to assist with ADL evaluation and planning for discharge  - Provide patient education as appropriate  Outcome: Progressing  Goal: Maintain proper alignment of affected body part  Description:  INTERVENTIONS:  - Support, maintain and protect limb and body alignment  - Provide patient/ family with appropriate education  Outcome: Progressing

## 2024-09-02 VITALS
RESPIRATION RATE: 18 BRPM | HEART RATE: 65 BPM | OXYGEN SATURATION: 93 % | BODY MASS INDEX: 26.16 KG/M2 | SYSTOLIC BLOOD PRESSURE: 126 MMHG | DIASTOLIC BLOOD PRESSURE: 61 MMHG | TEMPERATURE: 97.4 F | WEIGHT: 157.19 LBS

## 2024-09-02 LAB
ATRIAL RATE: 100 BPM
GLUCOSE SERPL-MCNC: 169 MG/DL (ref 65–140)
GLUCOSE SERPL-MCNC: 218 MG/DL (ref 65–140)
GLUCOSE SERPL-MCNC: 222 MG/DL (ref 65–140)
GLUCOSE SERPL-MCNC: 238 MG/DL (ref 65–140)
P AXIS: 66 DEGREES
PR INTERVAL: 184 MS
QRS AXIS: 72 DEGREES
QRSD INTERVAL: 96 MS
QT INTERVAL: 370 MS
QTC INTERVAL: 477 MS
T WAVE AXIS: 40 DEGREES
VENTRICULAR RATE: 100 BPM

## 2024-09-02 PROCEDURE — 97166 OT EVAL MOD COMPLEX 45 MIN: CPT

## 2024-09-02 PROCEDURE — 82948 REAGENT STRIP/BLOOD GLUCOSE: CPT

## 2024-09-02 PROCEDURE — 97163 PT EVAL HIGH COMPLEX 45 MIN: CPT

## 2024-09-02 PROCEDURE — 93010 ELECTROCARDIOGRAM REPORT: CPT | Performed by: INTERNAL MEDICINE

## 2024-09-02 PROCEDURE — 97530 THERAPEUTIC ACTIVITIES: CPT

## 2024-09-02 PROCEDURE — 99232 SBSQ HOSP IP/OBS MODERATE 35: CPT | Performed by: INTERNAL MEDICINE

## 2024-09-02 RX ADMIN — GABAPENTIN 300 MG: 300 CAPSULE ORAL at 08:03

## 2024-09-02 RX ADMIN — INSULIN LISPRO 2 UNITS: 100 INJECTION, SOLUTION INTRAVENOUS; SUBCUTANEOUS at 21:10

## 2024-09-02 RX ADMIN — HEPARIN SODIUM 5000 UNITS: 5000 INJECTION INTRAVENOUS; SUBCUTANEOUS at 06:10

## 2024-09-02 RX ADMIN — ACETAMINOPHEN 975 MG: 325 TABLET ORAL at 06:10

## 2024-09-02 RX ADMIN — CLOPIDOGREL BISULFATE 75 MG: 75 TABLET ORAL at 08:03

## 2024-09-02 RX ADMIN — HEPARIN SODIUM 5000 UNITS: 5000 INJECTION INTRAVENOUS; SUBCUTANEOUS at 13:12

## 2024-09-02 RX ADMIN — INSULIN LISPRO 1 UNITS: 100 INJECTION, SOLUTION INTRAVENOUS; SUBCUTANEOUS at 16:32

## 2024-09-02 RX ADMIN — BUDESONIDE AND FORMOTEROL FUMARATE DIHYDRATE 2 PUFF: 160; 4.5 AEROSOL RESPIRATORY (INHALATION) at 17:05

## 2024-09-02 RX ADMIN — GABAPENTIN 600 MG: 300 CAPSULE ORAL at 21:06

## 2024-09-02 RX ADMIN — MIRTAZAPINE 7.5 MG: 7.5 TABLET, FILM COATED ORAL at 21:06

## 2024-09-02 RX ADMIN — FLUTICASONE PROPIONATE 2 SPRAY: 50 SPRAY, METERED NASAL at 08:02

## 2024-09-02 RX ADMIN — AMLODIPINE BESYLATE 10 MG: 10 TABLET ORAL at 08:03

## 2024-09-02 RX ADMIN — INSULIN LISPRO 1 UNITS: 100 INJECTION, SOLUTION INTRAVENOUS; SUBCUTANEOUS at 12:38

## 2024-09-02 RX ADMIN — PROPRANOLOL HYDROCHLORIDE 60 MG: 60 CAPSULE, EXTENDED RELEASE ORAL at 08:07

## 2024-09-02 RX ADMIN — INSULIN LISPRO 1 UNITS: 100 INJECTION, SOLUTION INTRAVENOUS; SUBCUTANEOUS at 08:02

## 2024-09-02 RX ADMIN — BACLOFEN 5 MG: 10 TABLET ORAL at 16:32

## 2024-09-02 RX ADMIN — LATANOPROST 1 DROP: 50 SOLUTION OPHTHALMIC at 21:10

## 2024-09-02 RX ADMIN — BACLOFEN 5 MG: 10 TABLET ORAL at 08:03

## 2024-09-02 RX ADMIN — ACETAMINOPHEN 975 MG: 325 TABLET ORAL at 21:06

## 2024-09-02 RX ADMIN — POLYETHYLENE GLYCOL 3350 17 G: 17 POWDER, FOR SOLUTION ORAL at 17:04

## 2024-09-02 RX ADMIN — SENNOSIDES AND DOCUSATE SODIUM 2 TABLET: 50; 8.6 TABLET ORAL at 21:06

## 2024-09-02 RX ADMIN — FAMOTIDINE 20 MG: 20 TABLET, FILM COATED ORAL at 06:10

## 2024-09-02 RX ADMIN — ACETAMINOPHEN 975 MG: 325 TABLET ORAL at 13:12

## 2024-09-02 RX ADMIN — GABAPENTIN 300 MG: 300 CAPSULE ORAL at 17:04

## 2024-09-02 RX ADMIN — LIDOCAINE 1 PATCH: 50 PATCH TOPICAL at 08:06

## 2024-09-02 RX ADMIN — FAMOTIDINE 20 MG: 20 TABLET, FILM COATED ORAL at 16:32

## 2024-09-02 RX ADMIN — ATORVASTATIN CALCIUM 80 MG: 80 TABLET, FILM COATED ORAL at 08:03

## 2024-09-02 RX ADMIN — PANTOPRAZOLE SODIUM 40 MG: 40 TABLET, DELAYED RELEASE ORAL at 06:10

## 2024-09-02 RX ADMIN — BUDESONIDE AND FORMOTEROL FUMARATE DIHYDRATE 2 PUFF: 160; 4.5 AEROSOL RESPIRATORY (INHALATION) at 08:02

## 2024-09-02 RX ADMIN — BACLOFEN 5 MG: 10 TABLET ORAL at 21:06

## 2024-09-02 RX ADMIN — HEPARIN SODIUM 5000 UNITS: 5000 INJECTION INTRAVENOUS; SUBCUTANEOUS at 21:06

## 2024-09-02 RX ADMIN — CYANOCOBALAMIN TAB 500 MCG 500 MCG: 500 TAB at 08:03

## 2024-09-02 RX ADMIN — POLYETHYLENE GLYCOL 3350 17 G: 17 POWDER, FOR SOLUTION ORAL at 08:02

## 2024-09-02 NOTE — PLAN OF CARE
Problem: OCCUPATIONAL THERAPY ADULT  Goal: Performs self-care activities at highest level of function for planned discharge setting.  See evaluation for individualized goals.  Description: Treatment Interventions: ADL retraining, UE strengthening/ROM, Functional transfer training, Equipment evaluation/education, Cognitive reorientation, Endurance training, Patient/family training, Compensatory technique education, Energy conservation, Activityengagement          See flowsheet documentation for full assessment, interventions and recommendations.   Note: Limitation: Decreased ADL status, Decreased cognition, Decreased UE strength, Decreased Safe judgement during ADL, Decreased sensation, Decreased endurance, Decreased self-care trans  Prognosis: Good  Assessment: Pt is a 74 y.o. male seen for OT evaluation s/p admit to [facility] on 8/31/2024 w/ Left leg pain. CT L LE: Severe left hip degenerative changes. No fracture Comorbidities affecting pt's functional performance at time of assessment include: dizziness, MS, DM II, HTN, constipation, CHF, h/o CVA, GERSON, hyperlipidemia, h/o aneurysm of basilar artery, diabetic neuropathy. Personal factors affecting pt at time of IE include:limited home support, difficulty performing ADLS, difficulty performing IADLS , limited insight into deficits, and decreased initiation and engagement . Prior to admission, pt was living w/ family and reports HHA a few times a week and [facility] and reports:per pt setup grooming and self-feeding, assist w/ ADLs, assist w/ IADLs, assist w/ bed mobility, last time utilized sit to stand lift was a few months ago. Upon evaluation: Pt requires MOD assist grooming, Total assist LB ADLs, total assist toileting, MAX assist x2 supine>sit bed mobility, dependent assist x2 sit>supine bed mobility , EOB sitting x 7min w/ mod assist able to support self w/ rail briefly less than 30sec 2* the following deficits impacting occupational performance: decreased  strength and endurance, impaired balance, impaired activity tolerance, impaired functional reach, increased pain in L LE, sporadic spasms in b/l LEs, rigidity and hypertonicity. Pt to benefit from continued skilled OT tx while in the hospital to address deficits as defined above and maximize level of functional independence w ADL's and functional mobility. Occupational Performance areas to address include: grooming, bathing/shower, toilet hygiene, dressing, health maintenance, functional mobility, and clothing management. From OT standpoint, recommendation at time of d/c would be level II moderate resources.   The patient's raw score on the AM-PAC Daily Activity Inpatient Short Form is 10. A raw score of less than 19 suggests the patient may benefit from discharge to post-acute rehabilitation services. Please refer to the recommendation of the Occupational Therapist for safe discharge planning.     Rehab Resource Intensity Level, OT: II (Moderate Resource Intensity) (home w/ continued 24/7 care vs rehab if family is unable to provide)

## 2024-09-02 NOTE — PHYSICAL THERAPY NOTE
PHYSICAL THERAPY EVALUATION  NAME:  Mathew Rico  DATE: 09/02/24    AGE:   74 y.o.  Mrn:   [id number]  ADMIT DX:  Abnormal neurological exam [R29.90]    Past Medical History:   Diagnosis Date    Acute laryngitis     Acute nonsuppurative otitis media, unspecified laterality     Arm weakness     Arthritis     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HCC)     Bladder infection     Bronchitis     Constipation     Cough     Diabetes (HCC)     Diabetes mellitus (HCC)     Dizziness     Dysfunction of eustachian tube     Erectile dysfunction of non-organic origin     Fatigue     Glaucoma     Hiatal hernia     Hypertension     Imbalance     Leg muscle spasm     Leukocytosis 04/01/2024    MS (multiple sclerosis) ([facility])     Multiple sclerosis (HCC)     Nephrolithiasis     Neurogenic bladder     No natural teeth     Sinus pain     Spinal stenosis     Strain of thoracic region     Stroke ([facility])     Suprapubic catheter ([facility])      Length Of Stay: 1  Performed at least 2 patient identifiers during session: Name and Birthday    PHYSICAL THERAPY EVALUATION :    09/02/24 0945   PT Last Visit   PT Visit Date 09/02/24   Note Type   Note type Evaluation   Pain Assessment   Pain Assessment Tool 0-10   Pain Score 7   Pain Location/Orientation Orientation: Bilateral;Location: Leg   Hospital Pain Intervention(s) Repositioned;Ambulation/increased activity   Restrictions/Precautions   Weight Bearing Precautions Per Order No   Other Precautions Pain;Bed Alarm;Chair Alarm;Fall Risk   Home Living   Type of Home House   Home Layout One level;Performs ADLs on one level;Able to live on main level with bedroom/bathroom;Ramped entrance   Bathroom Shower/Tub Walk-in shower   Bathroom Toilet Raised   Bathroom Equipment Grab bars in shower;Shower chair;Commode   Bathroom Accessibility Accessible   Home Equipment Walker;Wheelchair-manual;Cane   Additional Comments Dieudonne reports  he has been bedbound the last few months, reports at some point he stopped getting OOB to w/c  and he now has lost the ability to have functional mobiliity in his w/c. He reports he occasionally has visit from therapy at his home, [facility] but no longer can do this. He has a HHA but he is unclear about the frequency of their visits.   Prior Function   Level of [place] Needs assistance with ADLs;Needs assistance with functional mobility;Needs assistance with IADLS   Lives With Family  (sister and brother)   Receives Help From Family   IADLs Family/Friend/Other provides meals;Family/Friend/Other provides transportation;Family/Friend/Other provides medication management   Falls in the last 6 months 0   Vocational Retired   Comments Pt reports he wants to get regain his mobility out of the bed, but between lack of assistance to do this activity and his LE spasms he is finding this difficult.   General   Additional Pertinent History Patient with past medical history of multiple sclerosis with neurogenic bladder and chronic SPT tube (primarily bed bound), diastolic CHF, CVA, diabetic neuropathy, hypertension, diabetes, GERSON presents with worsening bilateral leg pain, L>R since since 8/30 PM  CT scan reveals severe left hip degenerative changes; Neurology evaluated bedside.  Patient has no new focal deficits/red flag symptoms.  No indications for steroid treatment . Pain in setting of worsening LE spasticity and contractures related to thoracic spine demyelination with cord atrophy secondary to progressive MS. Orthopedic input requested.   Continue scheduled Tylenol, lidocaine patch, baclofen ; PT OT consult requested, patient refusing rehab  Patient recommedned to establish care with physiatry, could consider outpatient Botox.   Family/Caregiver Present No   Cognition   Overall Cognitive Status WFL   Arousal/Participation Alert   Attention Attends with cues to redirect   Orientation Level Oriented to person;Oriented to place;Disoriented to time;Oriented to situation   Memory  Decreased short term memory   Following Commands Follows one step commands without difficulty   Comments Patient know to this therapist from previous work at [employer]. Patient did recognize therapist and able to recall previous care provided to him.   Subjective   Subjective I just stopped getting out of bed and now I can't.   RUE Assessment   RUE Assessment WFL  (4-/5, slight hypertonicity)   LUE Assessment   LUE Assessment WFL  (4-/5, slight hypertonicity)   RLE Assessment   RLE Assessment X   Tone RLE   RLE Tone Hyperreflexia;Hypertonic  (Patient with spasms that are brought on by change in joint position. Spasms into both flexion and extension depending on hip angle. PROM intact grossly.)   LLE Assessment   LLE Assessment X   Tone LLE   LLE Tone Hypertonic;Hyperreflexia  (Patient with spasms that are brought on by change in joint position. Spasms into both flexion and extension depending on hip angle. PROM intact grossly.)   Vision-Basic Assessment   Current Vision No visual deficits   Coordination   Movements are Fluid and Coordinated 0   Coordination and Movement Description Frequent spasms noted in B LE.   Bed Mobility   Rolling R 2  Maximal assistance   Additional items Assist x 1;Bedrails;Increased time required   Supine to Sit 2  Maximal assistance   Additional items Assist x 2;Bedrails;Increased time required;Verbal cues;LE management;HOB elevated   Sit to Supine 1  Dependent   Additional items Assist x 2;Bedrails;Increased time required;LE management;Verbal cues   Transfers   Sit to Stand Unable to assess   Additional Comments Patient not appropriate for standing activities as this time.   Ambulation/Elevation   Gait pattern Not appropriate   Gait Assistance Not tested   Balance   Static Sitting Poor   Dynamic Sitting Poor -   Activity Tolerance   Activity Tolerance Patient limited by fatigue;Patient limited by pain;Treatment limited secondary to medical complications (Comment)  (rigidity and spasms  w/ MS)   Medical Staff Made Aware OTLia:Pt seen for co-evaluation/treatment with skilled Occupational Therapist 2* clinically unstable/unpredictable presentation, medical complexity, fall risk, cognitive impairments, functional/physical limitations, impaired functional balance, decreased safety awareness, limited activity tolerance which is decline from PLOF and may impact overall functional mobility/mobility safety.   Nurse Made Aware Spoke with [employer] Odonnell for clearance for session and post session.   Assessment   Prognosis Fair   Problem List Decreased strength;Decreased endurance;Impaired balance;Decreased mobility;Decreased cognition;Decreased coordination;Pain   Barriers to Discharge None   Goals   Patient Goals Get OOB when I get home.   STG Expiration Date 09/08/24   Short Term Goal #1 7 days: 1).  Pt will perform bed mobility with Mod A of 1 demonstrating appropriate technique 100% of the time in order to improve function. 2)  Improve overall strength and balance 1/2 grade in order to optimize ability to perform functional tasks and reduce fall risk.3) Increase activity tolerance to 25 minutes in order to improve endurance to functional tasks. 4) PT for ongoing patient education, DME needs and d/c planning in order to promote highest level of function in least restrictive environment.   PT Treatment Day 1   Plan   Treatment/Interventions LE strengthening/ROM;Therapeutic exercise;Endurance training;Patient/family training;Equipment eval/education;Bed mobility;Spoke to nursing;OT   PT Frequency 2-3x/wk   Discharge Recommendation   Rehab Resource Intensity Level, PT II (Moderate Resource Intensity)   Additional Comments Continue HH services 2x wk   AM-PAC Basic Mobility Inpatient   Turning in Flat Bed Without Bedrails 2   Lying on Back to Sitting on Edge of Flat Bed Without Bedrails 1   Moving Bed to Chair 1   Standing Up From Chair Using Arms 1   Walk in Room 1   Climb 3-5 Stairs With Railing 1   Basic  Mobility Inpatient Raw Score 7   Turning Head Towards Sound 4   Follow Simple Instructions 4   Low Function Basic Mobility Raw Score  15   Low Function Basic Mobility Standardized Score  23.9   [facility] Highest Level Of Mobility   -HL Goal 2: Bed activities/Dependent transfer   -[facility] Achieved 3: Sit at edge of bed   Additional Treatment Session   Start Time 0956   End Time 1004   Treatment Assessment Patient tolerated session along with evaluation with stable vitals.  Will continue PT services while in hospital at this time at bed level for ROM and bed mobility. Patient participated in sitting on EOB for 7 minutes with Max A for trunk support and supervision of another, he was able to birefly support himself with holding onto the rail for 30 seconds.  Patient remained in  bed post session. Paitent to continue PT while in the hospital to work on sitting balance and functional activity tolerance.   End of Consult   Patient Position at End of Consult Supine;Bed/Chair alarm activated;All needs within reach       (Please find full objective findings from PT assessment regarding body systems outlined above).     Assessment: Pt is a 74 y.o. male seen for PT evaluation s/p admit to [facility] on 8/31/2024 w/ Left leg pain. Dieudonne was also recently admitted 6/14/24 for sepsis dt UTI. PMHx significant for MS, T2DM, CHF, neuropathy, neurogenic bladder, anxiety, glaucoma, CVA  Order placed for PT.  Prior to admission, pt required A for mobility, lived with family in one level environment with ramped entrance, used w/c for mobility, and was primarily bed bound for most days . Upon evaluation: Pt requires  max of 2 assistance for bed mobility, mechanical conveyance from nursing standpoint for OOB mobility, and not appropriate for weight bearing activity.  Pt's clinical presentation is currently unstable/unpredictable given the functional mobility deficits above, especially decreased endurance, pain, and impaired  tone, coupled with fall risks including impaired balance, and combined with medical complications of abnormal WBCs and readmission to hospital.  Pt to benefit from continued skilled PT tx while in hospital and upon DC to address deficits as defined above and maximize level of functional mobility. The patient's AM-PAC Basic Mobility Inpatient Short Form Raw Score is 7, a score less than 16 indicates the patient may benefit from discharge to post-acute rehabilitation services.  From PT/mobility standpoint, recommendation at time of d/c would be home with family support and Level 2 services through [employer] .  Recommend ther ex next 1-2 sessions and EOB sitting with Assist of 2 for safety due to spasms .        The following objective measures were performed on IE: AM-PAC 6-Clicks: 7/24.   Comorbidities affecting pt's physical performance at time of assessment include: DM, CVA, and MS . Personal factors affecting pt at time of IE include: limited home support, past experience, behavioral pattern, inability to perform IADLs, inability to perform ADLs, and inability to ambulate household distances.     Tootie Lyon, PT, DPT, GCS

## 2024-09-02 NOTE — PROGRESS NOTES
[facility]  Progress Note  Name: Mathew Rico I  MRN: [id number]  Unit/Bed#: E2 -01 I Date of Admission: 8/31/2024   Date of Service: 9/2/2024 I Hospital Day: 1    Assessment & Plan   * Left leg pain  Assessment & Plan  Patient with past medical history of multiple sclerosis with neurogenic bladder and chronic SPT tube (primarily bed bound), diastolic CHF, CVA, diabetic neuropathy, hypertension, diabetes, GERSON presents with worsening bilateral leg pain, L>R since since 8/30 PM  CT scan reveals severe left hip degenerative changes  Neurology evaluated bedside.  Patient has no new focal deficits/red flag symptoms.  No indications for steroid treatment  Pain in setting of worsening LE spasticity and contractures related to thoracic spine demyelination with cord atrophy secondary to progressive MS  Orthopedic input will be appreciated  Continue scheduled Tylenol, lidocaine patch, baclofen   PT OT consult, patient refusing rehab  Patient needs to establish care with physiatry, could consider outpatient Botox    Dizziness  Assessment & Plan  With recent admission of vertiginous symptoms  Continue meclizine as needed  CT on admission does show right middle ear effusion, started on intranasal steroids  Ultimately patient would benefit from outpatient ENT evaluation  Patient did have recent CTA which displayed left internal carotid artery stenosis and was referred to vascular surgery    Multiple sclerosis (HCC)  Assessment & Plan  Patient is bedbound at baseline  Continue home meds    Type 2 diabetes mellitus without complication, without long-term current use of insulin ([facility])  Assessment & Plan  Lab Results   Component Value Date    HGBA1C 8.0 (H) 06/16/2024     Hold home oral hypoglycemics  Continue sliding scale insulin with Accu-Cheks, diabetic diet    Primary hypertension  Assessment & Plan  Elevated on admission likely secondary to discomfort  Improved   Continue Norvasc with hold  parameters  Patient on Inderal with history of essential tremor    Constipation  Assessment & Plan  Continue home bowel regimen with MiraLAX and Senokot, no BM here  Tolerating diet, abd soft  Give suppository today    Chronic diastolic congestive heart failure (HCC)  Assessment & Plan  Wt Readings from Last 3 Encounters:   08/31/24 71.3 kg (157 lb 3 oz)   07/26/24 69.2 kg (152 lb 8.9 oz)   07/20/24 81.3 kg (179 lb 3.7 oz)   Last echo 2018 LVEF 65%, grade 1 diastolic dysfunction  BNP normal on on admission  Clinically euvolemic on exam, not on diuretics PTA    History of CVA (cerebrovascular accident)  Assessment & Plan  History of left hemisphere lacunar infarct in 2018  Continue Plavix and atorvastatin for secondary stroke prevention    Obstructive sleep apnea  Assessment & Plan  CPAP qhs    Hyperlipidemia  Assessment & Plan  Continue statin, CK normal    Esophageal reflux  Assessment & Plan  Continue PPI  Patient passed nursing bedside swallow eval.  Recently evaluated by speech therapy and was recommended for regular diet and thins    Neurogenic bladder  Assessment & Plan  Recent admission urology exchange SPT tube  Catheter draining well, no pain  Continue exchanges every 4 weeks  Continue urogenital care    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HCC)  Assessment & Plan  CTA with redemonstrated stent assisted basilar artery tip aneurysm coiling  Also has about 55 to 60% atherosclerotic stenosis in the left ICA with no significant stenosis on the right and he was referred to vascular as an OP    Diabetic neuropathy (Former[facility])  Assessment & Plan  Patient is on gabapentin 300 mg twice daily and 600 mg qhs             Mobility:  Basic Mobility Inpatient Raw Score: 10  JH-HLM Goal: 4: Move to chair/commode  JH-HLM Achieved: 1: Laying in bed  JH-HLM Goal NOT achieved. Continue with multidisciplinary rounding and encourage appropriate mobility to improve upon JH-HLM goals.    VTE Pharmacologic Prophylaxis:   Pharmacologic:  heparin    Patient Centered Rounds: I have performed bedside rounds with nursing staff today.      Education and Discussions with Family / Patient: Updated patient's brother    Time Spent for Care:   More than 50% of total time spent on counseling and coordination of care as described above.    Current Length of Stay: 1 day(s)    Current Patient Status: Inpatient   Certification Statement: The patient will continue to require additional inpatient hospital stay due to orthopedic evaluation, left leg pain    Discharge Plan / Estimated Discharge Date: 24-48h    Code Status: Level 1 - Full Code      Subjective:   Patient seen and examined at bedside, complains of intermittent pain in his left lower extremity.  Denies any numbness or weakness of lower extremities.    Objective:     Vitals:   Temp (24hrs), Av.6 °F (36.4 °C), Min:97.5 °F (36.4 °C), Max:97.9 °F (36.6 °C)    Temp:  [97.5 °F (36.4 °C)-97.9 °F (36.6 °C)] 97.9 °F (36.6 °C)  HR:  [65-75] 68  Resp:  [17-20] 20  BP: (114-129)/(57-69) 115/57  SpO2:  [94 %-96 %] 96 %  Body mass index is 26.16 kg/m².     Input and Output Summary (last 24 hours):       Intake/Output Summary (Last 24 hours) at 2024 1242  Last data filed at 2024 0801  Gross per 24 hour   Intake 240 ml   Output 800 ml   Net -560 ml       Physical Exam:    Constitutional: Patient is in no acute distress, intermittently forgetful  HEENT:  Normocephalic, atraumatic  Cardiovascular: Normal S1S2, RRR, No murmurs/rubs/gallops appreciated.  Pulmonary:  Bilateral air entry, No rhonchi/rales/wheezing appreciated  Abdominal: Soft, Bowel sounds present, Non-tender, Non-distended, suprapubic catheter  Extremities:  No cyanosis, clubbing or edema.   Neurological: Awake, alert  Skin:  Warm, dry    Additional Data:     Labs:    Results from last 7 days   Lab Units 24  0457 24  0742   WBC Thousand/uL 9.75 12.61*   HEMOGLOBIN g/dL 13.3 14.8   HEMATOCRIT % 40.9 45.6   PLATELETS Thousands/uL 322 348    SEGS PCT %  --  66   LYMPHO PCT %  --  20   MONO PCT %  --  7   EOS PCT %  --  6     Results from last 7 days   Lab Units 09/01/24  0457 08/31/24  0742   POTASSIUM mmol/L 3.5 4.2   CHLORIDE mmol/L 103 106   CO2 mmol/L 22 23   BUN mg/dL 18 18   CREATININE mg/dL 1.05 0.92   CALCIUM mg/dL 9.3 9.3   ALK PHOS U/L  --  97   ALT U/L  --  11   AST U/L  --  18     Results from last 7 days   Lab Units 08/31/24  0742   INR  0.90        I Have Reviewed All Lab Data Listed Above.    Invasive Devices       Peripheral Intravenous Line  Duration             Peripheral IV 08/31/24 Dorsal (posterior);Left Hand 2 days    Peripheral IV 08/31/24 Dorsal (posterior);Right Forearm 2 days              Drain  Duration             Suprapubic Catheter 24 Fr. 79 days                      Recent Cultures (last 7 days):           Last 24 Hours Medication List:   Current Facility-Administered Medications   Medication Dose Route Frequency Provider Last Rate    acetaminophen  975 mg Oral Q8H [facility] Seng Daniel PA-C      albuterol  2.5 mg Nebulization Q6H PRN Seng Daniel PA-C      aluminum-magnesium hydroxide-simethicone  30 mL Oral Q4H PRN Blanca Morgan MD      amLODIPine  10 mg Oral Daily Seng Daniel PA-C      And    atorvastatin  80 mg Oral Daily Seng Daniel PA-C      baclofen  5 mg Oral TID Seng Daniel PA-C      bisacodyl  10 mg Rectal Daily PRN Seng Daniel PA-C      budesonide-formoterol  2 puff Inhalation BID Seng Daniel PA-C      clopidogrel  75 mg Oral Daily Seng Daniel PA-C      cyanocobalamin  500 mcg Oral Daily Seng Daniel PA-C      diazepam  2.5 mg Intramuscular Q8H PRN Seng Daniel PA-C      famotidine  20 mg Oral BID AC Blanca Morgan MD      fluticasone  2 spray Each Nare Daily Seng Daniel PA-C      gabapentin  300 mg Oral BID Seng Daniel PA-C      gabapentin  600 mg Oral HS Seng Daniel PA-C      heparin (porcine)  5,000 Units Subcutaneous Q8H [facility] Seng Daniel PA-C      insulin lispro  1-5 Units  Subcutaneous TID AC Seng Daniel PA-C      insulin lispro  1-5 Units Subcutaneous HS Seng Daniel PA-C      latanoprost  1 drop Both Eyes HS Seng Daniel PA-C      lidocaine  1 patch Topical Daily Seng Daniel PA-C      meclizine  12.5 mg Oral Q8H PRN Seng Daniel PA-C      melatonin  3 mg Oral HS PRN Seng Daniel PA-C      mirtazapine  7.5 mg Oral HS Seng Daniel PA-C      naloxone  0.04 mg Intravenous Q1MIN PRN Seng Daniel PA-C      ondansetron  4 mg Intravenous Q6H PRN Blanca Morgan MD      pantoprazole  40 mg Oral Early Morning Seng Daniel PA-C      polyethylene glycol  17 g Oral BID Seng Daniel PA-C      propranolol  60 mg Oral Daily Seng Daniel PA-C      senna-docusate sodium  2 tablet Oral HS Seng Daniel PA-C          Today, Patient Was Seen By: Miah Alexander MD

## 2024-09-02 NOTE — ASSESSMENT & PLAN NOTE
With recent admission of vertiginous symptoms  Continue meclizine as needed  CT on admission does show right middle ear effusion, started on intranasal steroids  Ultimately patient would benefit from outpatient ENT evaluation  Patient did have recent CTA which displayed left internal carotid artery stenosis and was referred to vascular surgery

## 2024-09-02 NOTE — PLAN OF CARE
Problem: PHYSICAL THERAPY ADULT  Goal: Performs mobility at highest level of function for planned discharge setting.  See evaluation for individualized goals.  Description: Treatment/Interventions: LE strengthening/ROM, Therapeutic exercise, Endurance training, Patient/family training, Equipment eval/education, Bed mobility, Spoke to nursing, OT          See flowsheet documentation for full assessment, interventions and recommendations.  Note: Prognosis: Fair  Problem List: Decreased strength, Decreased endurance, Impaired balance, Decreased mobility, Decreased cognition, Decreased coordination, Pain  Assessment: Pt is a 74 y.o. male seen for PT evaluation s/p admit to [facility] on 8/31/2024 w/ Left leg pain. Dieudonne was also recently admitted 6/14/24 for sepsis dt UTI. PMHx significant for MS, T2DM, CHF, neuropathy, neurogenic bladder, anxiety, glaucoma, CVA  Order placed for PT.  Prior to admission, pt required A for mobility, lived with family in one level environment with ramped entrance, used w/c for mobility, and was primarily bed bound for most days . Upon evaluation: Pt requires  max of 2 assistance for bed mobility, mechanical conveyance from nursing standpoint for OOB mobility, and not appropriate for weight bearing activity.  Pt's clinical presentation is currently unstable/unpredictable given the functional mobility deficits above, especially decreased endurance, pain, and impaired tone, coupled with fall risks including impaired balance, and combined with medical complications of abnormal WBCs and readmission to hospital.  Pt to benefit from continued skilled PT tx while in hospital and upon DC to address deficits as defined above and maximize level of functional mobility. The patient's AM-PAC Basic Mobility Inpatient Short Form Raw Score is 7, a score less than 16 indicates the patient may benefit from discharge to post-acute rehabilitation services.  From PT/mobility standpoint, recommendation  at time of d/c would be home with family support and Level 2 services through [facility] .  Recommend ther ex next 1-2 sessions and EOB sitting with Assist of 2 for safety due to spasms .  Barriers to Discharge: None     Rehab Resource Intensity Level, PT: II (Moderate Resource Intensity)    See flowsheet documentation for full assessment.

## 2024-09-02 NOTE — UTILIZATION REVIEW
Continued Stay Review    SEE INITIAL REVIEW AT BOTTOM    Date: 09/02                          Current Patient Class: Inpatient  Current Level of Care: MS    HPI:74 y.o. male initially admitted on  09/01    Assessment/Plan:   Date: 09/02  Day 3: Has surpassed a 2nd midnight with active treatments and services.  Pt still c/o ntermittent pain in his LLE. CT scan reveals severe left hip degenerative changes. Continue scheduled Tylenol, lidocaine patch, baclofen xtremity. Continue meclizine as needed for dizziness.PT/OT recommending Level 2, moderate resource Intensity for rehab.

## 2024-09-02 NOTE — ASSESSMENT & PLAN NOTE
Patient with past medical history of multiple sclerosis with neurogenic bladder and chronic SPT tube (primarily bed bound), diastolic CHF, CVA, diabetic neuropathy, hypertension, diabetes, GERSON presents with worsening bilateral leg pain, L>R since since 8/30 PM  CT scan reveals severe left hip degenerative changes  Neurology evaluated bedside.  Patient has no new focal deficits/red flag symptoms.  No indications for steroid treatment  Pain in setting of worsening LE spasticity and contractures related to thoracic spine demyelination with cord atrophy secondary to progressive MS  Orthopedic input will be appreciated  Continue scheduled Tylenol, lidocaine patch, baclofen   PT OT consult, patient refusing rehab  Patient needs to establish care with physiatry, could consider outpatient Botox

## 2024-09-02 NOTE — OCCUPATIONAL THERAPY NOTE
Occupational Therapy Evaluation     Patient Name: Mathew Rico  Today's Date: 9/2/2024  Problem List  Principal Problem:    Left leg pain  Active Problems:    Diabetic neuropathy (HCC)    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HCC)    Neurogenic bladder    Esophageal reflux    Hyperlipidemia    Obstructive sleep apnea    History of CVA (cerebrovascular accident)    Excessive daytime sleepiness    Chronic diastolic congestive heart failure (HCC)    Constipation    Primary hypertension    Type 2 diabetes mellitus without complication, without long-term current use of insulin (HCC)    Multiple sclerosis (HCC)    Dizziness    Past Medical History  Past Medical History:   Diagnosis Date    Acute laryngitis     Acute nonsuppurative otitis media, unspecified laterality     Arm weakness     Arthritis     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HCC)     Bladder infection     Bronchitis     Constipation     Cough     Diabetes (HCC)     Diabetes mellitus (HCC)     Dizziness     Dysfunction of eustachian tube     Erectile dysfunction of non-organic origin     Fatigue     Glaucoma     Hiatal hernia     Hypertension     Imbalance     Leg muscle spasm     Leukocytosis 04/01/2024    MS (multiple sclerosis) (HCC)     Multiple sclerosis (HCC)     Nephrolithiasis     Neurogenic bladder     No natural teeth     Sinus pain     Spinal stenosis     Strain of thoracic region     Stroke ([facility])     Suprapubic catheter ([facility])      Past Surgical History  Past Surgical History:   Procedure Laterality Date    APPENDECTOMY      BRAIN SURGERY      Coil placed in aneurysm    CEREBRAL ANEURYSM REPAIR      CYSTOSCOPY      CYSTOSCOPY      CYSTOSCOPY  06/11/2018    CYSTOSCOPY  01/15/2021    EYE SURGERY      transscleral cyclophotocoagulation noncontact YAG laser    IR SUPRAPUBIC CATHETER CHECK/CHANGE/REINSERTION/UPSIZE  3/28/2024    MYRINGOTOMY      with ventilation tube insertion    KY LITHOLAPAXY SMPL/SM <2.5 CM N/A 5/7/2019    Procedure: CYSTOSCOPY, holmium laser  litholapaxy of bladder stones, EXCHANGE OF SP TUBE;  Surgeon: Javy Jeffries MD;  Location: [facility];  Service: Urology    SUPRAPUBIC CATHETER INSERTION           09/02/24 1002   OT Last Visit   OT Visit Date 09/02/24   Note Type   Note type Evaluation   Pain Assessment   Pain Assessment Tool 0-10   Pain Score 7   Pain Location/Orientation Orientation: Left;Location: Leg   Hospital Pain Intervention(s) Ambulation/increased activity;Repositioned;Emotional support   Restrictions/Precautions   Weight Bearing Precautions Per Order No   Other Precautions Bed Alarm;Chair Alarm;Cognitive;Pain;Fall Risk  (spasms)   Home Living   Type of Home House   Home Layout One level;Performs ADLs on one level;Able to live on main level with bedroom/bathroom;Ramped entrance   Bathroom Shower/Tub Walk-in shower  (sponge bathing)   Bathroom Toilet Raised   Bathroom Equipment Grab bars in shower;Shower chair;Commode   Bathroom Accessibility Accessible   Home Equipment Walker;Wheelchair-manual;Cane  (sit to stand lift)   Additional Comments pt reports has been bedbound the last few months, reports someone had his stop getting OOB to w/c and he is unable to do it, reports receiving therapy through [facility] and [facility] several days a week   Prior Function   Level of [place] Needs assistance with ADLs;Needs assistance with functional mobility;Needs assistance with IADLS   Lives With Family  (sister and brother)   Receives Help From Family   IADLs Family/Friend/Other provides meals;Family/Friend/Other provides transportation;Family/Friend/Other provides medication management   Falls in the last 6 months 0   Vocational Retired   Comments pt reports wants to get back out of bed, reports spasms in LEs limit him   Lifestyle   Autonomy per pt setup grooming and self-feeding, assist w/ ADLs, assist w/ IADLs, assist w/ bed mobility, last time utilized sit to stand lift was a few months ago   Reciprocal Relationships family   Service to  "Others retired   Intrinsic Gratification watch tv   Subjective   Subjective \"I have been weak, I want to try and move\"   ADL   Where Assessed Edge of bed   Eating Assistance 4  Minimal Assistance   Grooming Assistance 3  Moderate Assistance   UB Bathing Assistance 3  Moderate Assistance   LB Bathing Assistance 1  Total Assistance   UB Dressing Assistance 3  Moderate Assistance   LB Dressing Assistance 1  Total Assistance   Toileting Assistance  1  Total Assistance   Functional Assistance 2  Maximal Assistance   Bed Mobility   Supine to Sit 2  Maximal assistance   Additional items Assist x 2;Increased time required;Verbal cues;LE management;Bedrails;HOB elevated   Sit to Supine 1  Dependent   Additional items Assist x 2;Increased time required;Verbal cues;LE management;Bedrails   Additional Comments pt sat EOB, x 7min w/ MOD assist support, able to support self w/ rail brief less than 30sec at a time   Transfers   Sit to Stand Unable to assess   Additional Comments pt not appropriate at this time as spasms in b/l LEs into flexion and extension make it unsafe to trial   Functional Mobility   Additional Comments unable to assess; recommend asim lift OOB w/ NSG and caution and support of LEs due to spasms   Balance   Static Sitting Poor   Dynamic Sitting Poor -   Activity Tolerance   Activity Tolerance Patient limited by fatigue;Patient limited by pain;Treatment limited secondary to medical complications (Comment)  (rigidity and spasms w/ MS)   Medical Staff Made Aware PT Tootie: Pt seen for co-session with skilled Physical therapist 2* clinically unstable presentation, medical complexity, new precautions, performance deficits/functional limitations, impaired cognition/safety awareness, limited activity tolerance and present impairments which are a regression from patient patient's baseline and impacting overall occupational performance   Nurse Made Aware appropriate to see per RN: Belle NUNES Assessment   RUE " Assessment WFL  (4-/5, slight hypertonicity)   LUE Assessment   LUE Assessment WFL  (4-/5, slight hypertonicity)   Hand Function   Gross Motor Coordination Functional   Fine Motor Coordination Functional   Sensation   Light Touch No apparent deficits   Proprioception   Proprioception No apparent deficits   Vision-Basic Assessment   Current Vision No visual deficits   Vision - Complex Assessment   Ocular Range of Motion Intact   Cognition   Overall Cognitive Status WFL   Arousal/Participation Alert;Responsive;Cooperative   Attention Attends with cues to redirect   Orientation Level Oriented to person;Oriented to place;Disoriented to time;Oriented to situation   Memory Decreased short term memory   Following Commands Follows one step commands without difficulty   Comments pleasant and cooperative, pt reports being tired of being in bed at home   Assessment   Limitation Decreased ADL status;Decreased cognition;Decreased UE strength;Decreased Safe judgement during ADL;Decreased sensation;Decreased endurance;Decreased self-care trans   Prognosis Good   Assessment Pt is a 74 y.o. male seen for OT evaluation s/p admit to [facility] on 8/31/2024 w/ Left leg pain. CT L LE: Severe left hip degenerative changes. No fracture Comorbidities affecting pt's functional performance at time of assessment include: dizziness, MS, DM II, HTN, constipation, CHF, h/o CVA, GERSON, hyperlipidemia, h/o aneurysm of basilar artery, diabetic neuropathy. Personal factors affecting pt at time of IE include:limited home support, difficulty performing ADLS, difficulty performing IADLS , limited insight into deficits, and decreased initiation and engagement . Prior to admission, pt was living w/ family and reports [facility] a few times a week and [facility] and reports:per pt setup grooming and self-feeding, assist w/ ADLs, assist w/ IADLs, assist w/ bed mobility, last time utilized sit to stand lift was a few months ago. Upon evaluation: Pt requires MOD assist  "grooming, Total assist LB ADLs, total assist toileting, MAX assist x2 supine>sit bed mobility, dependent assist x2 sit>supine bed mobility , EOB sitting x 7min w/ mod assist able to support self w/ rail briefly less than 30sec 2* the following deficits impacting occupational performance: decreased strength and endurance, impaired balance, impaired activity tolerance, impaired functional reach, increased pain in L LE, sporadic spasms in b/l LEs, rigidity and hypertonicity. Pt to benefit from continued skilled OT tx while in the hospital to address deficits as defined above and maximize level of functional independence w ADL's and functional mobility. Occupational Performance areas to address include: grooming, bathing/shower, toilet hygiene, dressing, health maintenance, functional mobility, and clothing management. From OT standpoint, recommendation at time of d/c would be level II moderate resources.   The patient's raw score on the AM-PAC Daily Activity Inpatient Short Form is 10. A raw score of less than 19 suggests the patient may benefit from discharge to post-acute rehabilitation services. Please refer to the recommendation of the Occupational Therapist for safe discharge planning.   Goals   Patient Goals \"get better to get out of bed\"   LT Time Frame 10-14   Long Term Goal please see below goals   Plan   Treatment Interventions ADL retraining;UE strengthening/ROM;Functional transfer training;Equipment evaluation/education;Cognitive reorientation;Endurance training;Patient/family training;Compensatory technique education;Energy conservation;Activityengagement   Goal Expiration Date 09/16/24   OT Frequency 2-3x/wk   Discharge Recommendation   Rehab Resource Intensity Level, OT II (Moderate Resource Intensity)  (home w/ continued 24/7 care vs rehab if family is unable to provide)   AM-PAC Daily Activity Inpatient   Lower Body Dressing 1   Bathing 2   Toileting 1   Upper Body Dressing 2   Grooming 2   Eating 2 "   Daily Activity Raw Score 10   Turning Head Towards Sound 4   Follow Simple Instructions 4   Low Function Daily Activity Raw Score 18   Low Function Daily Activity Standardized Score  30.17   AM-PAC Applied Cognition Inpatient   Following a Speech/Presentation 4   Understanding Ordinary Conversation 4   Taking Medications 1   Remembering Where Things Are Placed or Put Away 3   Remembering List of 4-5 Errands 2   Taking Care of Complicated Tasks 2   Applied Cognition Raw Score 16   Applied Cognition Standardized Score 35.03   Modified [place] Scale   Modified [place] Scale 4   End of Consult   Education Provided Yes   Patient Position at End of Consult All needs within reach;Supine   Nurse Communication Nurse aware of consult     Occupational Therapy Goals to be met in 10-14 days:  1) Pt will improve activity tolerance to F+ for 20 min txment sessions to enhance ADLs  2) Pt will complete UB ADLs/self care w/ setup and mod assist LB ADLS  4) OTR to assess functional transfers as appropriate and to establish goals  5) Pt will engage in ongoing cognitive assessment w/ G participation to A w/ safe d/c planning/recommendations  6) Pt will demonstrate G carryover of pt/caregiver education and training as appropriate w/ mod I  w/ G tolerance  7) Pt will engage in depression screen/leisure interest checklist w/ G participation to monitor s/s depression and ID 3 positive coping strategies to A w/ emotional regulation and management  8) Pt will demonstrate 100% carryover of E.C. techniques w/ mod I t/o fx'l I/ADL/leisure tasks w/o cues s/p skilled education  9) Pt will tolerate bed mobility and EOB seated tasks w/ min A for 20 mins to engage in fx'l I/ADL/leisure tasks w/ min A w/ min cues  10) Pt will demonstrate improved b/l UE strength by 1 MMT grade to enhance ADLS and functional transfers  Documentation completed by: Camille Marvin MS, OTR/L

## 2024-09-03 LAB
GLUCOSE SERPL-MCNC: 167 MG/DL (ref 65–140)
GLUCOSE SERPL-MCNC: 226 MG/DL (ref 65–140)

## 2024-09-03 PROCEDURE — 99239 HOSP IP/OBS DSCHRG MGMT >30: CPT | Performed by: INTERNAL MEDICINE

## 2024-09-03 PROCEDURE — 82948 REAGENT STRIP/BLOOD GLUCOSE: CPT

## 2024-09-03 PROCEDURE — 99222 1ST HOSP IP/OBS MODERATE 55: CPT | Performed by: ORTHOPAEDIC SURGERY

## 2024-09-03 RX ADMIN — FAMOTIDINE 20 MG: 20 TABLET, FILM COATED ORAL at 06:38

## 2024-09-03 RX ADMIN — PANTOPRAZOLE SODIUM 40 MG: 40 TABLET, DELAYED RELEASE ORAL at 06:38

## 2024-09-03 RX ADMIN — BACLOFEN 5 MG: 10 TABLET ORAL at 09:27

## 2024-09-03 RX ADMIN — PROPRANOLOL HYDROCHLORIDE 60 MG: 60 CAPSULE, EXTENDED RELEASE ORAL at 09:29

## 2024-09-03 RX ADMIN — CLOPIDOGREL BISULFATE 75 MG: 75 TABLET ORAL at 09:29

## 2024-09-03 RX ADMIN — AMLODIPINE BESYLATE 10 MG: 10 TABLET ORAL at 09:28

## 2024-09-03 RX ADMIN — BUDESONIDE AND FORMOTEROL FUMARATE DIHYDRATE 2 PUFF: 160; 4.5 AEROSOL RESPIRATORY (INHALATION) at 09:29

## 2024-09-03 RX ADMIN — ACETAMINOPHEN 975 MG: 325 TABLET ORAL at 06:38

## 2024-09-03 RX ADMIN — HEPARIN SODIUM 5000 UNITS: 5000 INJECTION INTRAVENOUS; SUBCUTANEOUS at 13:25

## 2024-09-03 RX ADMIN — ATORVASTATIN CALCIUM 80 MG: 80 TABLET, FILM COATED ORAL at 09:28

## 2024-09-03 RX ADMIN — INSULIN LISPRO 1 UNITS: 100 INJECTION, SOLUTION INTRAVENOUS; SUBCUTANEOUS at 12:10

## 2024-09-03 RX ADMIN — CYANOCOBALAMIN TAB 500 MCG 500 MCG: 500 TAB at 09:28

## 2024-09-03 RX ADMIN — FLUTICASONE PROPIONATE 2 SPRAY: 50 SPRAY, METERED NASAL at 09:29

## 2024-09-03 RX ADMIN — HEPARIN SODIUM 5000 UNITS: 5000 INJECTION INTRAVENOUS; SUBCUTANEOUS at 06:38

## 2024-09-03 RX ADMIN — ACETAMINOPHEN 975 MG: 325 TABLET ORAL at 13:24

## 2024-09-03 RX ADMIN — POLYETHYLENE GLYCOL 3350 17 G: 17 POWDER, FOR SOLUTION ORAL at 09:29

## 2024-09-03 RX ADMIN — INSULIN LISPRO 1 UNITS: 100 INJECTION, SOLUTION INTRAVENOUS; SUBCUTANEOUS at 09:37

## 2024-09-03 RX ADMIN — GABAPENTIN 300 MG: 300 CAPSULE ORAL at 09:28

## 2024-09-03 RX ADMIN — LIDOCAINE 1 PATCH: 50 PATCH TOPICAL at 09:29

## 2024-09-03 NOTE — DISCHARGE SUMMARY
[facility]  Discharge- Mathew Rico 1950, 74 y.o. male MRN: [id number]  Unit/Bed#: E2 -01 Encounter: [id number]  Primary Care Provider: Carolina Kumari MD   Date and time admitted to hospital: 8/31/2024  6:10 AM    * Left leg pain  Assessment & Plan  Patient with past medical history of multiple sclerosis with neurogenic bladder and chronic SPT tube (primarily bed bound), diastolic CHF, CVA, diabetic neuropathy, hypertension, diabetes, GERSON presents with worsening bilateral leg pain, L>R since since 8/30 PM  CT scan reveals severe left hip degenerative changes  Neurology evaluated bedside.  Patient has no new focal deficits/red flag symptoms.  No indications for steroid treatment  Pain in setting of worsening LE spasticity and contractures related to thoracic spine demyelination with cord atrophy secondary to progressive MS  Orthopedic input  appreciated  Continue baclofen, gabapentin, acetaminophen as needed  PT/OT, patient refusing rehab    Dizziness  Assessment & Plan  With recent admission of vertiginous symptoms  Continue meclizine as needed  CT on admission does show right middle ear effusion, started on intranasal steroids  Patient did have recent CTA which displayed left internal carotid artery stenosis and was referred to vascular surgery  Ambulatory referral for ENT    Multiple sclerosis (HCC)  Assessment & Plan  Patient is bedbound at baseline  Continue home meds    Type 2 diabetes mellitus without complication, without long-term current use of insulin (HCC)  Assessment & Plan  Lab Results   Component Value Date    HGBA1C 8.0 (H) 06/16/2024       Primary hypertension  Assessment & Plan    Continue Norvasc with hold parameters  Patient on Inderal with history of essential tremor    Chronic diastolic congestive heart failure (HCC)  Assessment & Plan  Wt Readings from Last 3 Encounters:   08/31/24 71.3 kg (157 lb 3 oz)   07/26/24 69.2 kg (152 lb 8.9 oz)   07/20/24  81.3 kg (179 lb 3.7 oz)   Last echo 2018 LVEF 65%, grade 1 diastolic dysfunction  BNP normal on on admission  Clinically euvolemic on exam, not on diuretics PTA    History of CVA (cerebrovascular accident)  Assessment & Plan  History of left hemisphere lacunar infarct in 2018  Continue Plavix and atorvastatin for secondary stroke prevention    Obstructive sleep apnea  Assessment & Plan  CPAP qhs    Hyperlipidemia  Assessment & Plan  Continue statin, CK normal    Esophageal reflux  Assessment & Plan  Continue PPI  Patient passed nursing bedside swallow eval.  Recently evaluated by speech therapy and was recommended for regular diet and thins    Neurogenic bladder  Assessment & Plan  Recent admission urology exchange SPT tube  Catheter draining well, no pain  Continue exchanges every 4 weeks  Continue urogenital care    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HCC)  Assessment & Plan  CTA with redemonstrated stent assisted basilar artery tip aneurysm coiling  Also has about 55 to 60% atherosclerotic stenosis in the left ICA with no significant stenosis on the right and he was referred to vascular as an OP    Diabetic neuropathy (HCC)  Assessment & Plan  Patient is on gabapentin 300 mg twice daily and 600 mg qhs        Transition of Care Discharge Summary - [facility]    Patient Information: Mathew Rico 74 y.o. male MRN: [id number]  Unit/Bed#: E2 -01 Encounter: [id number]    Discharging Physician / Practitioner: Miah Alexander MD  PCP: Carolina Kumari MD  Admission Date: 8/31/2024  Discharge Date: 09/03/24    Disposition:      Other: home      Reason for Admission: left leg pain    Discharge Diagnoses:     Principal Problem:    Left leg pain  Active Problems:    Dizziness    Diabetic neuropathy (HCC)    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HCC)    Neurogenic bladder    Esophageal reflux    Hyperlipidemia    Obstructive sleep apnea    History of CVA (cerebrovascular accident)    Excessive daytime sleepiness     Chronic diastolic congestive heart failure (HCC)    Constipation    Primary hypertension    Type 2 diabetes mellitus without complication, without long-term current use of insulin (HCC)    Multiple sclerosis (HCC)  Resolved Problems:    Leukocytosis      Consultations During Hospital Stay:  IP CONSULT TO NEUROLOGY  IP CONSULT TO CASE MANAGEMENT  IP CONSULT TO ORTHOPEDIC SURGERY      Procedures Performed:     none    Medication Adjustments and Discharge Medications:  Medication Dosing Tapers - Please refer to Discharge Medication List for details on any medication dosing tapers (if applicable to patient).  Discharge Medication List: See after visit summary for reconciled discharge medications.     Wound Care Recommendations:  When applicable, please see wound care section of After Visit Summary.    Diet Recommendations at Discharge:  Diet -        Diet Orders   (From admission, onward)                 Start     Ordered    08/31/24 1314  Diet Mikey/CHO Controlled; Consistent Carbohydrate Diet Level 2 (5 carb servings/75 grams CHO/meal); Consistent Carbohydrate Diet Level 2 (5 carb servings/75 grams CHO/meal)  Diet effective now        References:    Adult Nutrition Support Algorithm    RD Therapeutic Diet Order Protocol   Question Answer Comment   Diet Type Mikey/CHO Controlled    Mikey/CHO Controlled Consistent Carbohydrate Diet Level 2 (5 carb servings/75 grams CHO/meal)    Other Restriction(s): Consistent Carbohydrate Diet Level 2 (5 carb servings/75 grams CHO/meal)    RD to adjust diet per protocol? Yes        08/31/24 1317                  Fluid Restriction - No Fluid Restriction at Discharge.      Significant Findings / Test Results:     XR chest 2 views    Result Date: 9/1/2024  Impression: No acute cardiopulmonary disease. Workstation performed: [id number]     XR spine lumbar minimum 4 views non injury    Result Date: 9/1/2024  Impression: No acute osseous abnormality. Degenerative changes as described. Computerized  Assisted Algorithm (CAA) may have been used to analyze all applicable images. Workstation performed: [id number]     XR hips bilateral 3-4 vw w pelvis if performed    Result Date: 9/1/2024  Impression: No acute osseous abnormality. Degenerative changes as described. Computerized Assisted Algorithm (CAA) may have been used to analyze all applicable images. Workstation performed: [id number]     CT lower extremity wo contrast left    Result Date: 9/1/2024  Impression: Severe left hip degenerative changes. No fracture. Workstation performed: [id number]     CTA head and neck with and without contrast    Result Date: 8/31/2024  Impression: CT Brain: 1.  No acute intracranial CT abnormality. 2.  Stable mild nonspecific white matter change may indicate combination of patient's known demyelinating disease and microangiopathy. 3.  Bilateral underpneumatized mastoid air cells effusion as well as right middle ear effusion. Correlate for otomastoiditis. CT Angiography: 1.  Patent major vessels of the [place] without high-grade stenosis. 2.  Redemonstrated stent assisted basilar artery tip aneurysm coiling. Significant streak artifact from the coil materials limits reliable assessment for residual or recurrent aneurysm. 3.  About 55-60% atherosclerotic stenosis in the left ICA origin. No significant stenosis in the right cervical carotid or bilateral vertebral arteries. Workstation performed: [id number]          Hospital Course:     Mathew Rico is a 74 y.o. male patient who originally presented to the hospital on 8/31/2024 due to left leg pain.  Patient history of multiple sclerosis, neurogenic bladder with chronic suprapubic catheter, is bedbound, diastolic CHF, CVA, diabetic neuropathy, hypertension diabetes mellitus lives at home with his sister.  Presented with left leg pain he had extensive workup done, he was also evaluated by neurology and orthopedic surgery possibly secondary to lower extremity spasms and  contractures.  Continue with gabapentin and baclofen.  Outpatient follow-up.    Please see above problem list for further details.      Condition at Discharge: good     Discharge Day Visit / Exam:     Subjective:      Vitals: Blood Pressure: 126/61 (09/02/24 2045)  Pulse: 65 (09/02/24 2045)  Temperature: (!) 97.4 °F (36.3 °C) (09/02/24 2045)  Temp Source: Temporal (09/02/24 2045)  Respirations: 18 (09/02/24 2045)  Weight - Scale: 71.3 kg (157 lb 3 oz) (08/31/24 0614)  SpO2: 93 % (09/02/24 2045)    Physical Exam:  Constitutional: Patient is oriented to person, place and year  HEENT:  Normocephalic, atraumatic  Cardiovascular: Normal S1S2, RRR, No murmurs/rubs/gallops appreciated.  Pulmonary:  Bilateral air entry, No rhonchi/rales/wheezing appreciated  Abdominal: Soft, Bowel sounds present, Non-tender, Non-distended  Extremities:  No cyanosis, clubbing or edema.   Neurological: Awake, alert    Discharge instructions/Information to patient and family:   See after visit summary section titled Discharge Instructions for information provided to patient and family.      Planned Readmission: no      Discharge Statement:  I spent 35 minutes discharging the patient. This time was spent on the day of discharge. I had direct contact with the patient on the day of discharge. Greater than 50% of the total time was spent examining patient, answering all patient questions, arranging and discussing plan of care with patient as well as directly providing post-discharge instructions.  Additional time then spent on discharge activities.    ** Please Note: This note has been constructed using a voice recognition system **

## 2024-09-03 NOTE — UTILIZATION REVIEW
Notification of Unplanned, Urgent, or   Emergency Inpatient Admission   AUTHORIZATION REQUEST   Admitting Facility [place] [address]  Tax ID: [id number]  NPI: [id number]  Place of Service: Acute Care Hospital  Admission Level of Care: Inpatient  Place of Service Code: 21     Attending Physician Information  Attending Name and NPI#: Miah Alexander Md [[id number]]  Phone: [phone]     Admission Information  Inpatient Admission Date/Time: 9/1/24  2:44 PM  Discharge Date/Time: No discharge date for patient encounter.  Admitting Diagnosis Code/Description:  Abnormal neurological exam [R29.90]     Utilization Review Contact  Marsha Garcia Utilization   Phone: [phone]  Fax: [phone]  Email: [email or web address]  Contact for approvals/pending authorizations, clinical reviews, and discharge.     Physician Advisory Services Contact  Medical Necessity Denial & Fcek-op-Ryrj Discussion  Phone: [phone]  Fax: [phone]  Email: [email or web address]     DISCHARGE SUPPORT TEAM:  For Patients Discharge Needs & Updates  Phone: [phone] opt. 2 Fax: [phone]  Email: [email or web address]

## 2024-09-03 NOTE — PLAN OF CARE
Problem: Prexisting or High Potential for Compromised Skin Integrity  Goal: Skin integrity is maintained or improved  Description: INTERVENTIONS:  - Identify patients at risk for skin breakdown  - Assess and monitor skin integrity  - Assess and monitor nutrition and hydration status  - Monitor labs   - Assess for incontinence   - Turn and reposition patient  - Assist with mobility/ambulation  - Relieve pressure over bony prominences  - Avoid friction and shearing  - Provide appropriate hygiene as needed including keeping skin clean and dry  - Evaluate need for skin moisturizer/barrier cream  - Collaborate with interdisciplinary team   - Patient/family teaching  - Consider wound care consult   Outcome: Progressing     Problem: PAIN - ADULT  Goal: Verbalizes/displays adequate comfort level or baseline comfort level  Description: Interventions:  - Encourage patient to monitor pain and request assistance  - Assess pain using appropriate pain scale  - Administer analgesics based on type and severity of pain and evaluate response  - Implement non-pharmacological measures as appropriate and evaluate response  - Consider cultural and social influences on pain and pain management  - Notify physician/advanced practitioner if interventions unsuccessful or patient reports new pain  Outcome: Progressing     Problem: INFECTION - ADULT  Goal: Absence or prevention of progression during hospitalization  Description: INTERVENTIONS:  - Assess and monitor for signs and symptoms of infection  - Monitor lab/diagnostic results  - Monitor all insertion sites, i.e. indwelling lines, tubes, and drains  - Monitor endotracheal if appropriate and nasal secretions for changes in amount and color  - [place] appropriate cooling/warming therapies per order  - Administer medications as ordered  - Instruct and encourage patient and family to use good hand hygiene technique  - Identify and instruct in appropriate isolation precautions for  identified infection/condition  Outcome: Progressing  Goal: Absence of fever/infection during neutropenic period  Description: INTERVENTIONS:  - Monitor WBC    Outcome: Progressing     Problem: SAFETY ADULT  Goal: Patient will remain free of falls  Description: INTERVENTIONS:  - Educate patient/family on patient safety including physical limitations  - Instruct patient to call for assistance with activity   - Consult OT/PT to assist with strengthening/mobility   - Keep Call bell within reach  - Keep bed low and locked with side rails adjusted as appropriate  - Keep care items and personal belongings within reach  - Initiate and maintain comfort rounds  - Make Fall Risk Sign visible to staff  - Offer Toileting every 2 Hours, in advance of need  - Initiate/Maintain bed alarm  - Obtain necessary fall risk management equipment: non skid socks  - Apply yellow socks and bracelet for high fall risk patients  - Consider moving patient to room near nurses station  Outcome: Progressing  Goal: Maintain or return to baseline ADL function  Description: INTERVENTIONS:  -  Assess patient's ability to carry out ADLs; assess patient's baseline for ADL function and identify physical deficits which impact ability to perform ADLs (bathing, care of mouth/teeth, toileting, grooming, dressing, etc.)  - Assess/evaluate cause of self-care deficits   - Assess range of motion  - Assess patient's mobility; develop plan if impaired  - Assess patient's need for assistive devices and provide as appropriate  - Encourage maximum independence but intervene and supervise when necessary  - Involve family in performance of ADLs  - Assess for home care needs following discharge   - Consider OT consult to assist with ADL evaluation and planning for discharge  - Provide patient education as appropriate  Outcome: Progressing  Goal: Maintains/Returns to pre admission functional level  Description: INTERVENTIONS:  - Perform AM-PAC 6 Click Basic Mobility/  Daily Activity assessment daily.  - Set and communicate daily mobility goal to care team and patient/family/caregiver.   - Collaborate with rehabilitation services on mobility goals if consulted  - Perform Range of Motion 3 times a day.  - Reposition patient every 2 hours.  - Dangle patient 3 times a day  - Stand patient 3 times a day  - Ambulate patient 3 times a day  - Out of bed to chair 3 times a day   - Out of bed for meals 3 times a day  - Out of bed for toileting  - Record patient progress and toleration of activity level   Outcome: Progressing     Problem: DISCHARGE PLANNING  Goal: Discharge to home or other facility with appropriate resources  Description: INTERVENTIONS:  - Identify barriers to discharge w/patient and caregiver  - Arrange for needed discharge resources and transportation as appropriate  - Identify discharge learning needs (meds, wound care, etc.)  - Arrange for interpretive services to assist at discharge as needed  - Refer to Case Management Department for coordinating discharge planning if the patient needs post-hospital services based on physician/advanced practitioner order or complex needs related to functional status, cognitive ability, or social support system  Outcome: Progressing     Problem: Knowledge Deficit  Goal: Patient/family/caregiver demonstrates understanding of disease process, treatment plan, medications, and discharge instructions  Description: Complete learning assessment and assess knowledge base.  Interventions:  - Provide teaching at level of understanding  - Provide teaching via preferred learning methods  Outcome: Progressing     Problem: Nutrition/Hydration-ADULT  Goal: Nutrient/Hydration intake appropriate for improving, restoring or maintaining nutritional needs  Description: Monitor and assess patient's nutrition/hydration status for malnutrition. Collaborate with interdisciplinary team and initiate plan and interventions as ordered.  Monitor patient's weight  and dietary intake as ordered or per policy. Utilize nutrition screening tool and intervene as necessary. Determine patient's food preferences and provide high-protein, high-caloric foods as appropriate.     INTERVENTIONS:  - Monitor oral intake, urinary output, labs, and treatment plans  - Assess nutrition and hydration status and recommend course of action  - Evaluate amount of meals eaten  - Assist patient with eating if necessary   - Allow adequate time for meals  - Recommend/ encourage appropriate diets, oral nutritional supplements, and vitamin/mineral supplements  - Order, calculate, and assess calorie counts as needed  - Recommend, monitor, and adjust tube feedings and TPN/PPN based on assessed needs  - Assess need for intravenous fluids  - Provide specific nutrition/hydration education as appropriate  - Include patient/family/caregiver in decisions related to nutrition  Outcome: Progressing     Problem: GENITOURINARY - ADULT  Goal: Maintains or returns to baseline urinary function  Description: INTERVENTIONS:  - Assess urinary function  - Encourage oral fluids to ensure adequate hydration if ordered  - Administer IV fluids as ordered to ensure adequate hydration  - Administer ordered medications as needed  - Offer frequent toileting  - Follow urinary retention protocol if ordered  Outcome: Progressing  Goal: Absence of urinary retention  Description: INTERVENTIONS:  - Assess patient’s ability to void and empty bladder  - Monitor I/O  - Bladder scan as needed  - Discuss with physician/AP medications to alleviate retention as needed  - Discuss catheterization for long term situations as appropriate  Outcome: Progressing  Goal: Urinary catheter remains patent  Description: INTERVENTIONS:  - Assess patency of urinary catheter  - If patient has a chronic dela cruz, consider changing catheter if non-functioning  - Follow guidelines for intermittent irrigation of non-functioning urinary catheter  Outcome:  Progressing     Problem: METABOLIC, FLUID AND ELECTROLYTES - ADULT  Goal: Electrolytes maintained within normal limits  Description: INTERVENTIONS:  - Monitor labs and assess patient for signs and symptoms of electrolyte imbalances  - Administer electrolyte replacement as ordered  - Monitor response to electrolyte replacements, including repeat lab results as appropriate  - Instruct patient on fluid and nutrition as appropriate  Outcome: Progressing  Goal: Fluid balance maintained  Description: INTERVENTIONS:  - Monitor labs   - Monitor I/O and WT  - Instruct patient on fluid and nutrition as appropriate  - Assess for signs & symptoms of volume excess or deficit  Outcome: Progressing  Goal: Glucose maintained within target range  Description: INTERVENTIONS:  - Monitor Blood Glucose as ordered  - Assess for signs and symptoms of hyperglycemia and hypoglycemia  - Administer ordered medications to maintain glucose within target range  - Assess nutritional intake and initiate nutrition service referral as needed  Outcome: Progressing     Problem: SKIN/TISSUE INTEGRITY - ADULT  Goal: Skin Integrity remains intact(Skin Breakdown Prevention)  Description: Assess:  -Perform Guille assessment every shift  -Clean and moisturize skin every shift  -Inspect skin when repositioning, toileting, and assisting with ADLS  -Assess under medical devices such as masimo every shift  -Assess extremities for adequate circulation and sensation     Bed Management:  -Have minimal linens on bed & keep smooth, unwrinkled  -Change linens as needed when moist or perspiring  -Avoid sitting or lying in one position for more than 2 hours while in bed  -Keep HOB at 45 degrees     Toileting:  -Offer bedside commode  -Assess for incontinence every 2 hrs  -Use incontinent care products after each incontinent episode such as incontinence cleanser    Activity:  -Mobilize patient 3 times a day  -Encourage activity and walks on unit  -Encourage or provide  ROM exercises   -Turn and reposition patient every 2 Hours  -Use appropriate equipment to lift or move patient in bed  -Instruct/ Assist with weight shifting every 60 min when out of bed in chair  -Consider limitation of chair time 2 hour intervals    Skin Care:  -Avoid use of baby powder, tape, friction and shearing, hot water or constrictive clothing  -Relieve pressure over bony prominences using mepilex  -Do not massage red bony areas    Next Steps:  -Teach patient strategies to minimize risks such as turning & repositioning   -Consider consults to  interdisciplinary teams such as wound care  Outcome: Progressing  Goal: Incision(s), wounds(s) or drain site(s) healing without S/S of infection  Description: INTERVENTIONS  - Assess and document dressing, incision, wound bed, drain sites and surrounding tissue  - Provide patient and family education  - Perform skin care/dressing changes every shift  Outcome: Progressing  Goal: Pressure injury heals and does not worsen  Description: Interventions:  - Implement low air loss mattress or specialty surface (Criteria met)  - Apply silicone foam dressing  - Instruct/assist with weight shifting every 60 minutes when in chair   - Limit chair time to 2 hour intervals  - Use special pressure reducing interventions such as waffle cushion when in chair   - Apply fecal or urinary incontinence containment device   - Perform passive or active ROM every shift  - Turn and reposition patient & offload bony prominences every 2 hours   - Utilize friction reducing device or surface for transfers   - Consider consults to  interdisciplinary teams such as wound care  - Use incontinent care products after each incontinent episode such as incontinence cleanser  - Consider nutrition services referral as needed  Outcome: Progressing     Problem: MUSCULOSKELETAL - ADULT  Goal: Maintain or return mobility to safest level of function  Description: INTERVENTIONS:  - Assess patient's ability to carry  out ADLs; assess patient's baseline for ADL function and identify physical deficits which impact ability to perform ADLs (bathing, care of mouth/teeth, toileting, grooming, dressing, etc.)  - Assess/evaluate cause of self-care deficits   - Assess range of motion  - Assess patient's mobility  - Assess patient's need for assistive devices and provide as appropriate  - Encourage maximum independence but intervene and supervise when necessary  - Involve family in performance of ADLs  - Assess for home care needs following discharge   - Consider OT consult to assist with ADL evaluation and planning for discharge  - Provide patient education as appropriate  Outcome: Progressing  Goal: Maintain proper alignment of affected body part  Description: INTERVENTIONS:  - Support, maintain and protect limb and body alignment  - Provide patient/ family with appropriate education  Outcome: Progressing

## 2024-09-03 NOTE — CASE MANAGEMENT
Case Management Discharge Planning Note    Patient name Mathew Rico  [facility] -* MRN [id number]  : 1950 Date 9/3/2024       Current Admission Date: 2024  Current Admission Diagnosis:Left leg pain   Patient Active Problem List    Diagnosis Date Noted Date Diagnosed    Pain of left hip 2024     Left leg pain 2024     Dizziness 2024     Pruritus 2024     Blurred vision, bilateral 2024     Symptoms of upper respiratory infection (URI) 06/15/2024     Chest pain 2024     Acute encephalopathy 2024     GERSON on CPAP 2024     Fall 2024     Primary hypertension 2024     Type 2 diabetes mellitus without complication, without long-term current use of insulin (HCC) 2024     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HCC) 2024     Multiple sclerosis (HCC) 2024     Urinary retention 2024     Neck pain 2024     Vitamin B deficiency 2024     Generalized weakness 2024     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     Fall 2023     Weakness 2023     Accidental fall from chair 2023     Constipation 2023     Chronic diastolic congestive heart failure (HCC) 2023     Hyponatremia 2023     Excessive daytime sleepiness 2022     Shortness of breath 2022     Pancreatic mass 2020     Pancreatic lesion 2020     Bladder stones 2019     Bladder neck contracture 2019     History of CVA (cerebrovascular accident) 10/21/2018     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HCC) 2017     Diabetic neuropathy (HCC) 2016     Neurogenic bladder 2016     Thyroid nodule 2015     Cervical spinal stenosis 2014     Generalized anxiety disorder 2014     Obstructive sleep apnea 2013     Benign colon polyp 2012     Esophageal reflux 2012     Fatty liver 2012     Glaucoma 2012     Hyperlipidemia 2012     Multiple sclerosis  (HCC) 06/19/2012     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) 06/19/2012     Primary hypertension 06/11/2012       LOS (days): 2  Geometric Mean LOS (GMLOS) (days): 3.7  Days to GMLOS:1.8     OBJECTIVE:  Risk of Unplanned Readmission Score: 55.45         Current admission status: Inpatient   Preferred Pharmacy:   [id number] [place] - [address]  [address] [facility] [address]  Phone: [phone] Fax: [phone]    [place], [place] - [address]  [address] [address]  Phone: [phone] Fax: [phone]    [facility] [place], [place] - [address],  [address]  [address] [facility] [address]  Phone: [phone] Fax: [phone]     [facility] [place], [place] - [address] [address]  Phone: [phone] Fax: [phone]    Primary Care Provider: Carolina Kumari MD    Primary Insurance: [facility]  Secondary Insurance:     DISCHARGE DETAILS:    Discharge planning discussed with:: Patient  Freedom of Choice: Yes  Comments - Freedom of Choice: Agreeable to resuming home care with [facility]  CM contacted family/caregiver?: Yes  Were Treatment Team discharge recommendations reviewed with patient/caregiver?: Yes  Did patient/caregiver verbalize understanding of patient care needs?: Yes       Contacts  Patient Contacts: Guzman Rico  Relationship to Patient:: Family  Contact Method: Phone  Phone Number: [phone]  Reason/Outcome: Discharge Planning    Requested Home Health Care         Is the patient interested in HHC at discharge?: Yes  Home Health Discipline requested:: Occupational Therapy, Physical Therapy  Home Health Agency Name:: Other ([facility])  HHA External Referral Reason (only applicable if external HHA name selected): Patient has established relationship with provider  Home Health Follow-Up Provider:: PCP  Home Health Services  Needed:: Gait/ADL Training, Strengthening/Theraputic Exercises to Improve Function  Homebound Criteria Met:: Requires the Assistance of Another Person for Safe Ambulation or to Leave the Home, Uses an Assist Device (i.e. cane, walker, etc)  Supporting Clincal Findings:: Bed Bound or Wheelchair Bound    DME Referral Provided  Referral made for DME?: No    Other Referral/Resources/Interventions Provided:  Interventions: Other (Specify)  Referral Comments: NING with [facility] for in home therapy         Treatment Team Recommendation: Home with Home Health Care (PT/OT provided by [facility])  Discharge Destination Plan:: Home with Home Health Care (PT/OT provided by [facility])  Transport at Discharge : Stretcher van                    Transfer Mode: Stretcher  Accompanied by: EMS personnel  Discussed with Patient  there may be an out of pocket expense for transport.       IMM Given (Date):: 09/03/24  IMM Given to:: Patient  IMM was reviewed with the pt. Pt reports understanding of the ability to appeal discharge if feeling as though not medically stable for discharge. Verbal consent obtained due to contact precaution and placed in the scan bin.    Family notified:: Guzman Rico

## 2024-09-03 NOTE — PLAN OF CARE
Problem: SAFETY ADULT  Goal: Patient will remain free of falls  Description: INTERVENTIONS:  - Educate patient/family on patient safety including physical limitations  - Instruct patient to call for assistance with activity   - Consult OT/PT to assist with strengthening/mobility   - Keep Call bell within reach  - Keep bed low and locked with side rails adjusted as appropriate  - Keep care items and personal belongings within reach  - Initiate and maintain comfort rounds  - Apply yellow socks and bracelet for high fall risk patients  - Consider moving patient to room near nurses station  Outcome: Progressing  Goal: Maintain or return to baseline ADL function  Description: INTERVENTIONS:  -  Assess patient's ability to carry out ADLs; assess patient's baseline for ADL function and identify physical deficits which impact ability to perform ADLs (bathing, care of mouth/teeth, toileting, grooming, dressing, etc.)  - Assess/evaluate cause of self-care deficits   - Assess range of motion  - Assess patient's mobility; develop plan if impaired  - Assess patient's need for assistive devices and provide as appropriate  - Encourage maximum independence but intervene and supervise when necessary  - Involve family in performance of ADLs  - Assess for home care needs following discharge   - Consider OT consult to assist with ADL evaluation and planning for discharge  - Provide patient education as appropriate  Outcome: Progressing  Goal: Maintains/Returns to pre admission functional level  Description: INTERVENTIONS:  - Perform AM-PAC 6 Click Basic Mobility/ Daily Activity assessment daily.  - Set and communicate daily mobility goal to care team and patient/family/caregiver.   - Collaborate with rehabilitation services on mobility goals if consulted  - Out of bed for toileting  - Record patient progress and toleration of activity level   Outcome: Progressing     Problem: Prexisting or High Potential for Compromised Skin  Integrity  Goal: Skin integrity is maintained or improved  Description: INTERVENTIONS:  - Identify patients at risk for skin breakdown  - Assess and monitor skin integrity  - Assess and monitor nutrition and hydration status  - Monitor labs   - Assess for incontinence   - Turn and reposition patient  - Assist with mobility/ambulation  - Relieve pressure over bony prominences  - Avoid friction and shearing  - Provide appropriate hygiene as needed including keeping skin clean and dry  - Evaluate need for skin moisturizer/barrier cream  - Collaborate with interdisciplinary team   - Patient/family teaching  - Consider wound care consult   Outcome: Progressing     Problem: Knowledge Deficit  Goal: Patient/family/caregiver demonstrates understanding of disease process, treatment plan, medications, and discharge instructions  Description: Complete learning assessment and assess knowledge base.  Interventions:  - Provide teaching at level of understanding  - Provide teaching via preferred learning methods  Outcome: Progressing     Problem: GENITOURINARY - ADULT  Goal: Maintains or returns to baseline urinary function  Description: INTERVENTIONS:  - Assess urinary function  - Encourage oral fluids to ensure adequate hydration if ordered  - Administer IV fluids as ordered to ensure adequate hydration  - Administer ordered medications as needed  - Offer frequent toileting  - Follow urinary retention protocol if ordered  Outcome: Progressing  Goal: Absence of urinary retention  Description: INTERVENTIONS:  - Assess patient’s ability to void and empty bladder  - Monitor I/O  - Bladder scan as needed  - Discuss with physician/AP medications to alleviate retention as needed  - Discuss catheterization for long term situations as appropriate  Outcome: Progressing  Goal: Urinary catheter remains patent  Description: INTERVENTIONS:  - Assess patency of urinary catheter  - If patient has a chronic dela cruz, consider changing catheter if  non-functioning  - Follow guidelines for intermittent irrigation of non-functioning urinary catheter  Outcome: Progressing

## 2024-09-03 NOTE — CONSULTS
Orthopedics   Mathew Rico 74 y.o. male MRN: [id number]  Unit/Bed#: E2 -01      Chief Complaint:   bilateral hip and leg pain, L>R    HPI:   74 y.o.male non-ambulator complaining of bilateral hip and leg pain. Denies any new injury or trauma prior to the onset of his symptoms. Patient reports very limited mobility at baseline due to PMH of MS, and has been bed-bound over the past several weeks due to increased pain. Pain is located diffusely throughout the bilateral legs, subacute in onset, constant in duration, severe in intensity. Exacerbating factors include movement of the legs, remitting factors include rest. Denies numbness, denies tingling, no open wounds noted. No other complaints at this time. PMH significant for MS with neurogenic bladder, CVA, DM, CHF, diabetic neuropathy, HTN, GERSON.    Review Of Systems:   Skin: Normal  Neuro: See HPI  Musculoskeletal: See HPI  14 point review of systems negative except as stated above     Past Medical History:   Past Medical History:   Diagnosis Date    Acute laryngitis     Acute nonsuppurative otitis media, unspecified laterality     Arm weakness     Arthritis     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HCC)     Bladder infection     Bronchitis     Constipation     Cough     Diabetes (HCC)     Diabetes mellitus (HCC)     Dizziness     Dysfunction of eustachian tube     Erectile dysfunction of non-organic origin     Fatigue     Glaucoma     Hiatal hernia     Hypertension     Imbalance     Leg muscle spasm     Leukocytosis 04/01/2024    MS (multiple sclerosis) (HCC)     Multiple sclerosis (HCC)     Nephrolithiasis     Neurogenic bladder     No natural teeth     Sinus pain     Spinal stenosis     Strain of thoracic region     Stroke (HCC)     Suprapubic catheter (HCC)        Past Surgical History:   Past Surgical History:   Procedure Laterality Date    APPENDECTOMY      BRAIN SURGERY      Coil placed in aneurysm    CEREBRAL ANEURYSM REPAIR      CYSTOSCOPY      CYSTOSCOPY       CYSTOSCOPY  2018    CYSTOSCOPY  01/15/2021    EYE SURGERY      transscleral cyclophotocoagulation noncontact YAG laser    IR SUPRAPUBIC CATHETER CHECK/CHANGE/REINSERTION/UPSIZE  3/28/2024    MYRINGOTOMY      with ventilation tube insertion    MO LITHOLAPAXY SMPL/SM <2.5 CM N/A 2019    Procedure: CYSTOSCOPY, holmium laser litholapaxy of bladder stones, EXCHANGE OF SP TUBE;  Surgeon: Javy Jeffries MD;  Location: [facility];  Service: Urology    SUPRAPUBIC CATHETER INSERTION         Family History:  Family history reviewed and non-contributory  Family History   Problem Relation Age of Onset    Heart attack Mother     Stroke Mother     Heart attack Father     Anuerysm Father         In Stomach     Aneurysm Father        Social History:  Social History     Socioeconomic History    Marital status: Single     Spouse name: None    Number of children: None    Years of education: None    Highest education level: None   Occupational History    Occupation:    retired   Tobacco Use    Smoking status: Former     Current packs/day: 0.00     Average packs/day: 0.5 packs/day for 31.0 years (15.5 ttl pk-yrs)     Types: Cigarettes     Start date:      Quit date:      Years since quittin.6    Smokeless tobacco: Never   Vaping Use    Vaping status: Never Used   Substance and Sexual Activity    Alcohol use: Not Currently    Drug use: No    Sexual activity: Not Currently   Other Topics Concern    None   Social History Narrative    ** Merged History Encounter **          Social Determinants of Health     Financial Resource Strain: Not on file   Food Insecurity: No Food Insecurity (2024)    Hunger Vital Sign     Worried About Running Out of Food in the Last Year: Never true     Ran Out of Food in the Last Year: Never true   Transportation Needs: No Transportation Needs (2024)    PRAPARE - Transportation     Lack of Transportation (Medical): No     Lack of Transportation (Non-Medical): No    Physical Activity: Not on file   Stress: Not on file   Social Connections: Not on file   Intimate Partner Violence: Not on file   Housing Stability: Low Risk  (9/1/2024)    Housing Stability Vital Sign     Unable to Pay for Housing in the Last Year: No     Number of Times Moved in the Last Year: 0     Homeless in the Last Year: No       Allergies:   Allergies   Allergen Reactions    Cephalexin Rash    Cephalexin Rash           Labs:  0   Lab Value Date/Time    HCT 40.9 09/01/2024 0457    HCT 45.6 08/31/2024 0742    HCT 45.2 07/28/2024 0511    HCT 48.7 08/10/2016 0000    HCT 48.4 06/22/2016 0000    HCT 47.1 01/15/2016 0000    HGB 13.3 09/01/2024 0457    HGB 14.8 08/31/2024 0742    HGB 14.7 07/28/2024 0511    HGB 15.7 08/10/2016 0000    HGB 15.6 06/22/2016 0000    HGB 15.2 01/15/2016 0000    INR 0.90 08/31/2024 0742    INR 0.84 (L) 09/09/2015 0700    WBC 9.75 09/01/2024 0457    WBC 12.61 (H) 08/31/2024 0742    WBC 10.37 (H) 07/28/2024 0511    WBC 10.8 08/10/2016 0000    WBC 12.6 (H) 06/22/2016 0000    WBC 10.9 (H) 01/15/2016 0000       Meds:    Current Facility-Administered Medications:     acetaminophen (TYLENOL) tablet 975 mg, 975 mg, Oral, Q8H CIERA, Seng Daniel PA-C, 975 mg at 09/03/24 0638    albuterol inhalation solution 2.5 mg, 2.5 mg, Nebulization, Q6H PRN, Seng Daniel PA-C    aluminum-magnesium hydroxide-simethicone (MAALOX) oral suspension 30 mL, 30 mL, Oral, Q4H PRN, Blanca Morgan MD    amLODIPine (NORVASC) tablet 10 mg, 10 mg, Oral, Daily, 10 mg at 09/03/24 0928 **AND** atorvastatin (LIPITOR) tablet 80 mg, 80 mg, Oral, Daily, Seng Daniel PA-C, 80 mg at 09/03/24 0928    baclofen tablet 5 mg, 5 mg, Oral, TID, Seng Daniel PA-C, 5 mg at 09/03/24 0927    bisacodyl (DULCOLAX) rectal suppository 10 mg, 10 mg, Rectal, Daily PRN, Seng Daniel PA-C    budesonide-formoterol (SYMBICORT) 160-4.5 mcg/act inhaler 2 puff, 2 puff, Inhalation, BID, Seng Daniel PA-C, 2 puff at 09/03/24 0929    clopidogrel  (PLAVIX) tablet 75 mg, 75 mg, Oral, Daily, Seng Daniel PA-C, 75 mg at 09/03/24 0929    cyanocobalamin (VITAMIN B-12) tablet 500 mcg, 500 mcg, Oral, Daily, Seng Daniel PA-C, 500 mcg at 09/03/24 0928    diazepam (VALIUM) injection 2.5 mg, 2.5 mg, Intramuscular, Q8H PRN, Seng Daniel PA-C    famotidine (PEPCID) tablet 20 mg, 20 mg, Oral, BID AC, Blanca Morgan MD, 20 mg at 09/03/24 0638    fluticasone (FLONASE) 50 mcg/act nasal spray 2 spray, 2 spray, Each Nare, Daily, Seng Daniel PA-C, 2 spray at 09/03/24 0929    gabapentin (NEURONTIN) capsule 300 mg, 300 mg, Oral, BID, Seng Daniel PA-C, 300 mg at 09/03/24 0928    gabapentin (NEURONTIN) capsule 600 mg, 600 mg, Oral, HS, Seng Daniel PA-C, 600 mg at 09/02/24 2106    heparin (porcine) subcutaneous injection 5,000 Units, 5,000 Units, Subcutaneous, Q8H CIERA, Seng Daniel PA-C, 5,000 Units at 09/03/24 0638    insulin lispro (HumALOG/ADMELOG) 100 units/mL subcutaneous injection 1-5 Units, 1-5 Units, Subcutaneous, TID AC, 1 Units at 09/03/24 0937 **AND** Fingerstick Glucose (POCT), , , TID AC, Seng Daniel PA-C    insulin lispro (HumALOG/ADMELOG) 100 units/mL subcutaneous injection 1-5 Units, 1-5 Units, Subcutaneous, HS, Seng Daniel PA-C, 2 Units at 09/02/24 2110    latanoprost (XALATAN) 0.005 % ophthalmic solution 1 drop, 1 drop, Both Eyes, HS, Seng Daniel PA-C, 1 drop at 09/02/24 2110    lidocaine (LIDODERM) 5 % patch 1 patch, 1 patch, Topical, Daily, Seng Daniel PA-C, 1 patch at 09/03/24 0929    meclizine (ANTIVERT) tablet 12.5 mg, 12.5 mg, Oral, Q8H PRN, Seng Daniel PA-C    melatonin tablet 3 mg, 3 mg, Oral, HS PRN, Seng Daniel PA-C, 3 mg at 09/01/24 2326    mirtazapine (REMERON) tablet 7.5 mg, 7.5 mg, Oral, HS, Seng Daniel PA-C, 7.5 mg at 09/02/24 2106    naloxone (NARCAN) 0.04 mg/mL syringe 0.04 mg, 0.04 mg, Intravenous, Q1MIN PRN, Seng Daniel PA-C    ondansetron (ZOFRAN) injection 4 mg, 4 mg, Intravenous, Q6H PRN, Blanca Morgan,  "MD    pantoprazole (PROTONIX) EC tablet 40 mg, 40 mg, Oral, Early Morning, Seng Daniel PA-C, 40 mg at 09/03/24 0638    polyethylene glycol (MIRALAX) packet 17 g, 17 g, Oral, BID, Seng Daniel PA-C, 17 g at 09/03/24 0929    propranolol (INDERAL LA) 24 hr capsule 60 mg, 60 mg, Oral, Daily, Seng Daniel PA-C, 60 mg at 09/03/24 0929    senna-docusate sodium (SENOKOT S) 8.6-50 mg per tablet 2 tablet, 2 tablet, Oral, HS, Seng Daniel PA-C, 2 tablet at 09/02/24 2106    Blood Culture:   Lab Results   Component Value Date    BLOODCX No Growth After 5 Days. 06/14/2024       Wound Culture:   No results found for: \"WOUNDCULT\"    Ins and Outs:  I/O last 24 hours:  In: 480 [P.O.:480]  Out: 1400 [Urine:1400]          Physical Exam:   /61 (BP Location: Right arm)   Pulse 65   Temp (!) 97.4 °F (36.3 °C) (Temporal)   Resp 18   Wt 71.3 kg (157 lb 3 oz)   SpO2 93%   BMI 26.16 kg/m²   Gen: No acute distress, resting comfortably in bed  HEENT: Eyes clear, moist mucus membranes, hearing intact  Respiratory: No audible wheezing or stridor  Cardiovascular: Well Perfused peripherally, 2+ distal pulse  Abdomen: nondistended, no peritoneal signs  Musculoskeletal: bilateral lower extremity  Skin dry, and intact, no erythema  Painful range of motion of hips, knees, and ankles. No micromotion tenderness  Denies sensation to light touch sp/dp/s/s/t bilaterally, but patient reports \"it hurts\"  Patient unable to cooperate with motor strength testing due to pain  TTP diffusely throughout bilateral thighs, calves, ankles, and feet  Palpable DP pulse  Musculature is soft and compressible.    Tertiary exam was negative for additional palpable stepoffs or additional bony tenderness to palpation    Radiology:   I personally reviewed the films.  X-rays of bilateral hip shows moderate hip osteoarthritis bilaterally. No acute fractures or dislocations.   CT left LE shows no acute fracture, dislocation or destructive osseous lesion. Severe " degenerative changes of the left hip.      Assessment:  74 y.o.male with bilateral hip and leg pain.    Plan:   WBAT bilateral lower extremity  PT/OT  Defer to neurology and primary team for pain control and further management.   Dispo: Ortho signing off. Recommend outpatient follow up if hip pain persists or worsens.       Mattie Moreno PA-C    This consult was completed with the senior resident and attending on call.

## 2024-09-04 NOTE — UTILIZATION REVIEW
NOTIFICATION OF ADMISSION DISCHARGE   This is a Notification of Discharge from [facility]. Please be advised that this patient has been discharge from our facility. Below you will find the admission and discharge date and time including the patient’s disposition.   UTILIZATION REVIEW CONTACT:  Marsha Garcia  Utilization   Network Utilization Review Department  Phone: [phone] x carefully listen to the prompts. All voicemails are confidential.  Email: [email or web address]     ADMISSION INFORMATION  PRESENTATION DATE: 8/31/2024  6:10 AM  OBERVATION ADMISSION DATE: 08/31/2024 0954  INPATIENT ADMISSION DATE: 9/1/24  2:44 PM   DISCHARGE DATE: 9/3/2024  1:48 PM   DISPOSITION:Home/Self Care    Network Utilization Review Department  ATTENTION: Please call with any questions or concerns to [phone] and carefully listen to the prompts so that you are directed to the right person. All voicemails are confidential.   For Discharge needs, contact Care Management DC Support Team at [phone] opt. 2  Send all requests for admission clinical reviews, approved or denied determinations and any other requests to dedicated fax number below belonging to the campus where the patient is receiving treatment. List of dedicated fax numbers for the Facilities:  FACILITY NAME UR FAX NUMBER   ADMISSION DENIALS (Administrative/Medical Necessity) [phone]   DISCHARGE SUPPORT TEAM ([facility]) [phone]   PARENT CHILD HEALTH (Maternity/NICU/Pediatrics) [phone]   [facility] [phone]   [facility] [phone]   [facility] [phone]   [facility] [phone]   [facility] [phone]   [facility] [phone]   [facility] [phone]   [facility]  [phone]   [facility] [phone]   [facility] [phone]   [facility] [phone]   [facility] [phone]

## 2024-09-06 NOTE — TELEPHONE ENCOUNTER
NEW HFU, PLEASE FOLLOW THESES INSTRUCTIONS!!!    [facility]/ MARY Sanchez/ Multiple sclerosis     [place]- home- 9/3/2024    Samantha Khalil MD  [facility] Discharge  Mathew ERIKA Rico will need follow-up in in 4 weeks with multiple sclerosis team for Established diagnosis of multiple sclerosis in 60 minute appointment. They will not require outpatient neurological testing.    Please scheduled with  [place]

## 2024-09-07 ENCOUNTER — HOSPITAL ENCOUNTER (EMERGENCY)
Facility: HOSPITAL | Age: 74
Discharge: HOME | End: 2024-09-07
Attending: EMERGENCY MEDICINE
Payer: MEDICARE

## 2024-09-07 VITALS
RESPIRATION RATE: 16 BRPM | TEMPERATURE: 98 F | HEART RATE: 92 BPM | WEIGHT: 154.1 LBS | DIASTOLIC BLOOD PRESSURE: 65 MMHG | SYSTOLIC BLOOD PRESSURE: 139 MMHG | BODY MASS INDEX: 25.64 KG/M2 | OXYGEN SATURATION: 96 %

## 2024-09-07 DIAGNOSIS — T83.010A SUPRAPUBIC CATHETER DYSFUNCTION, INITIAL ENCOUNTER (HCC): Primary | ICD-10-CM

## 2024-09-07 LAB
BACTERIA UR QL AUTO: ABNORMAL /HPF
BILIRUB UR QL STRIP: NEGATIVE
BUDDING YEAST: PRESENT
CLARITY UR: ABNORMAL
COLOR UR: YELLOW
GLUCOSE UR STRIP-MCNC: ABNORMAL MG/DL
HGB UR QL STRIP.AUTO: ABNORMAL
HYPHAE YEAST: PRESENT
KETONES UR STRIP-MCNC: ABNORMAL MG/DL
LEUKOCYTE ESTERASE UR QL STRIP: ABNORMAL
MUCOUS THREADS UR QL AUTO: ABNORMAL
NITRITE UR QL STRIP: NEGATIVE
NON-SQ EPI CELLS URNS QL MICRO: ABNORMAL /HPF
OTHER STN SPEC: ABNORMAL
PH UR STRIP.AUTO: 5.5 [PH] (ref 4.5–8)
PROT UR STRIP-MCNC: ABNORMAL MG/DL
RBC #/AREA URNS AUTO: ABNORMAL /HPF
SP GR UR STRIP.AUTO: 1.02 (ref 1–1.03)
UROBILINOGEN UR QL STRIP.AUTO: 0.2 E.U./DL
WBC #/AREA URNS AUTO: ABNORMAL /HPF

## 2024-09-07 PROCEDURE — 87077 CULTURE AEROBIC IDENTIFY: CPT

## 2024-09-07 PROCEDURE — 87186 SC STD MICRODIL/AGAR DIL: CPT

## 2024-09-07 PROCEDURE — 51705 CHANGE OF BLADDER TUBE: CPT | Performed by: EMERGENCY MEDICINE

## 2024-09-07 PROCEDURE — 87086 URINE CULTURE/COLONY COUNT: CPT

## 2024-09-07 PROCEDURE — 99283 EMERGENCY DEPT VISIT LOW MDM: CPT

## 2024-09-07 PROCEDURE — 99284 EMERGENCY DEPT VISIT MOD MDM: CPT | Performed by: EMERGENCY MEDICINE

## 2024-09-07 PROCEDURE — 81001 URINALYSIS AUTO W/SCOPE: CPT

## 2024-09-07 NOTE — ED PROVIDER NOTES
History  Chief Complaint   Patient presents with    Urinary Catheter Problem     Patient reports suprapubic catheter wasn't draining at home, reports lower abdominal discomfort.      74-year-old male from nursing home with history of MS, neurogenic bladder with chronic suprapubic catheter for years presents to the emergency department with suprapubic pain and he believes his suprapubic catheter is blocked.  He states it was functioning last evening and then this morning he noted decreased urine output.  Patient has had numerous ED visits for this in the past and has had it exchanged on some of the visits.  He has had no fevers or chills.      History provided by:  Patient and medical records   used: No    Urinary Catheter Problem  Location:  Suprapubic  Onset quality:  Gradual  Duration:  1 day  Timing:  Constant  Progression:  Unchanged  Chronicity:  New  Context:  Chronic suprapubic catheter  Associated symptoms: abdominal pain (Suprapubic)    Associated symptoms: no fever, no headaches, no rash and no vomiting        Prior to Admission Medications   Prescriptions Last Dose Informant Patient Reported? Taking?   Empagliflozin (JARDIANCE) 10 MG TABS tablet  Outside Facility (Specify) Yes No   Sig: Take 10 mg by mouth every morning   Ergocalciferol (VITAMIN D2 PO)  Outside Facility (Specify) Yes No   Sig: Take 50,000 Units by mouth once a week   acetaminophen (TYLENOL) 325 mg tablet  Outside Facility (Specify) No No   Sig: Take 2 tablets (650 mg total) by mouth every 6 (six) hours as needed for mild pain   albuterol (2.5 mg/3 mL) 0.083 % nebulizer solution  Outside Facility (Specify) No No   Sig: Take 3 mL (2.5 mg total) by nebulization every 6 (six) hours as needed for wheezing or shortness of breath   amLODIPine-atorvastatin (CADUET) 10-80 MG per tablet  Outside Facility (Specify) Yes No   Sig: Take 1 tablet by mouth daily   baclofen 10 mg tablet  Outside Facility (Specify) Yes No   Sig: Take 5  mg by mouth 3 (three) times a day   bisacodyl (DULCOLAX) 10 mg suppository  Outside Facility (Specify) Yes No   Sig: Insert 10 mg into the rectum daily as needed for constipation   budesonide-formoterol (SYMBICORT) 160-4.5 mcg/act inhaler  Outside Facility (Specify) Yes No   Sig: Inhale 2 puffs 2 (two) times a day Rinse mouth after use.   clopidogrel (PLAVIX) 75 mg tablet  Outside Facility (Specify) No No   Sig: Take 1 tablet (75 mg total) by mouth daily   cromolyn (NASALCHROM) 5.2 MG/ACT nasal spray   No No   Si spray into each nostril 3 (three) times a day   cyanocobalamin (VITAMIN B-12) 500 MCG tablet  Outside Facility (Specify) No No   Sig: Take 1 tablet (500 mcg total) by mouth daily   gabapentin (NEURONTIN) 300 mg capsule  Outside Facility (Specify) No No   Sig: Take 1 capsule (300 mg total) by mouth 2 (two) times a day   gabapentin (NEURONTIN) 300 mg capsule  Outside Facility (Specify) No No   Sig: Take 2 capsules (600 mg total) by mouth daily at bedtime   latanoprost (XALATAN) 0.005 % ophthalmic solution  Outside Facility (Specify) Yes No   Sig: Administer 1 drop to both eyes daily at bedtime   lidocaine (LIDODERM) 5 %  Outside Facility (Specify) No No   Sig: Apply 1 patch topically over 12 hours daily Remove & Discard patch within 12 hours or as directed by MD charlesne (ANTIVERT) 12.5 MG tablet   No No   Sig: Take 1 tablet (12.5 mg total) by mouth every 8 (eight) hours as needed for dizziness   melatonin 3 mg  Outside Facility (Specify) Yes No   Sig: Take 3 mg by mouth daily at bedtime as needed   metFORMIN (GLUCOPHAGE) 1000 MG tablet  Outside Facility (Specify) Yes No   Sig: Take 1,000 mg by mouth 2 (two) times a day with meals   methenamine hippurate (HIPREX) 1 g tablet  Outside Facility (Specify) No No   Sig: Take 1 tablet (1 g total) by mouth 2 (two) times a day with meals   mirtazapine (REMERON) 7.5 MG tablet  Outside Facility (Specify) Yes No   Sig: Take 7.5 mg by mouth daily at bedtime    pantoprazole (PROTONIX) 40 mg tablet  Outside Facility (Specify) Yes No   Sig: Take 40 mg by mouth daily   polyethylene glycol (MIRALAX) 17 g packet   No No   Sig: Take 17 g by mouth 2 (two) times a day   propranolol (INDERAL LA) 60 mg 24 hr capsule  Outside Facility (Specify) Yes No   Sig: Take 60 mg by mouth daily   senna-docusate sodium (SENOKOT S) 8.6-50 mg per tablet  Outside Facility (Specify) Yes No   Sig: Take 2 tablets by mouth daily at bedtime      Facility-Administered Medications: None       Past Medical History:   Diagnosis Date    Acute laryngitis     Acute nonsuppurative otitis media, unspecified laterality     Arm weakness     Arthritis     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[facility])     Bladder infection     Bronchitis     Constipation     Cough     Diabetes ([facility])     Diabetes mellitus (HCC)     Dizziness     Dysfunction of eustachian tube     Erectile dysfunction of non-organic origin     Fatigue     Glaucoma     Hiatal hernia     Hypertension     Imbalance     Leg muscle spasm     Leukocytosis 04/01/2024    MS (multiple sclerosis) ([facility])     Multiple sclerosis ([facility])     Nephrolithiasis     Neurogenic bladder     No natural teeth     Sinus pain     Spinal stenosis     Strain of thoracic region     Stroke ([facility])     Suprapubic catheter ([facility])        Past Surgical History:   Procedure Laterality Date    APPENDECTOMY      BRAIN SURGERY      Coil placed in aneurysm    CEREBRAL ANEURYSM REPAIR      CYSTOSCOPY      CYSTOSCOPY      CYSTOSCOPY  06/11/2018    CYSTOSCOPY  01/15/2021    EYE SURGERY      transscleral cyclophotocoagulation noncontact YAG laser    IR SUPRAPUBIC CATHETER CHECK/CHANGE/REINSERTION/UPSIZE  3/28/2024    MYRINGOTOMY      with ventilation tube insertion    NM LITHOLAPAXY SMPL/SM <2.5 CM N/A 5/7/2019    Procedure: CYSTOSCOPY, holmium laser litholapaxy of bladder stones, EXCHANGE OF SP TUBE;  Surgeon: Javy Jeffries MD;  Location: [facility];  Service: Urology    SUPRAPUBIC CATHETER INSERTION          Family History   Problem Relation Age of Onset    Heart attack Mother     Stroke Mother     Heart attack Father     Anuerysm Father         In Stomach     Aneurysm Father      I have reviewed and agree with the history as documented.    E-Cigarette/Vaping    E-Cigarette Use Never User      E-Cigarette/Vaping Substances    Nicotine No     THC No     CBD No     Flavoring No     Other No     Unknown No      Social History     Tobacco Use    Smoking status: Former     Current packs/day: 0.00     Average packs/day: 0.5 packs/day for 31.0 years (15.5 ttl pk-yrs)     Types: Cigarettes     Start date:      Quit date:      Years since quittin.7    Smokeless tobacco: Never   Vaping Use    Vaping status: Never Used   Substance Use Topics    Alcohol use: Not Currently    Drug use: No       Review of Systems   Constitutional: Negative.  Negative for fever.   HENT: Negative.     Eyes: Negative.    Respiratory: Negative.     Cardiovascular: Negative.    Gastrointestinal:  Positive for abdominal pain (Suprapubic). Negative for vomiting.   Genitourinary:  Positive for decreased urine volume and difficulty urinating.   Musculoskeletal:  Negative for neck pain.   Skin: Negative.  Negative for rash.   Allergic/Immunologic: Negative.    Neurological: Negative.  Negative for weakness, numbness and headaches.   Hematological: Negative.    Psychiatric/Behavioral: Negative.     All other systems reviewed and are negative.      Physical Exam  Physical Exam  Vitals and nursing note reviewed.   Constitutional:       General: He is awake. He is not in acute distress.     Appearance: He is well-developed and normal weight. He is not ill-appearing, toxic-appearing or diaphoretic.   HENT:      Head: Normocephalic and atraumatic.      Right Ear: External ear normal.      Left Ear: External ear normal.      Nose: Nose normal.      Mouth/Throat:      Mouth: Oropharynx is clear and moist.   Eyes:      General: No scleral icterus.      Extraocular Movements: EOM normal.      Conjunctiva/sclera: Conjunctivae normal.   Neck:      Thyroid: No thyromegaly.      Vascular: No JVD.   Cardiovascular:      Rate and Rhythm: Regular rhythm. Tachycardia present.      Heart sounds: Normal heart sounds. No murmur heard.     No gallop.   Pulmonary:      Effort: Pulmonary effort is normal. No respiratory distress.      Breath sounds: Normal breath sounds. No stridor. No wheezing, rhonchi or rales.   Abdominal:      General: Bowel sounds are normal. There is no distension.      Palpations: Abdomen is soft. There is no mass.      Tenderness: There is abdominal tenderness in the suprapubic area. There is no guarding or rebound.      Hernia: No hernia is present.      Comments: No bleeding or drainage from around the suprapubic catheter insertion site.  There is some urine in the Cage bag   Musculoskeletal:         General: No edema.   Skin:     General: Skin is warm and dry.      Coloration: Skin is not jaundiced or pale.      Findings: No bruising, erythema, lesion or rash.   Neurological:      General: No focal deficit present.      Mental Status: He is alert and oriented to person, place, and time.      Motor: No weakness.      Deep Tendon Reflexes: Reflexes are normal and symmetric.   Psychiatric:         Mood and Affect: Mood and affect and mood normal.         Behavior: Behavior is cooperative.         Vital Signs  ED Triage Vitals [09/07/24 1539]   Temperature Pulse Respirations Blood Pressure SpO2   98 °F (36.7 °C) (!) 114 18 153/86 96 %      Temp Source Heart Rate Source Patient Position - Orthostatic VS BP Location FiO2 (%)   Oral Monitor Sitting Right arm --      Pain Score       7           Vitals:    09/07/24 1539   BP: 153/86   Pulse: (!) 114   Patient Position - Orthostatic VS: Sitting         Visual Acuity      ED Medications  Medications - No data to display    Diagnostic Studies  Results Reviewed       None                   No orders to display               Procedures  Universal Protocol:  Patient understanding: patient states understanding of the procedure being performed  Patient identity confirmed: verbally with patient and arm band    Bladder catheterization    Date/Time: 9/7/2024 4:45 PM    Performed by: Yocasta Zavala DO  Authorized by: Yocasta Zavala DO    Patient location:  ED  [place] protocol:     Procedure explained and questions answered to patient or proxy's satisfaction: yes      Patient identity confirmed:  Verbally with patient and arm band  Pre-procedure details:     Procedure purpose:  Therapeutic  Anesthesia (see MAR for exact dosages):     Anesthesia method:  None  Procedure details:     Bladder irrigation: no      Catheter insertion:  Indwelling    Catheter type: Suprapubic catheter.    Catheter size:  24 Fr    Number of attempts:  1    Successful placement: yes      Urine appearance: Initially no urine, catheter flushed with return of fluid.  Post-procedure details:     Patient tolerance of procedure:  Tolerated well, no immediate complications           ED Course  ED Course as of 09/07/24 1641   Sat Sep 07, 2024   1612 Bedside ultrasound of bladder reveals some urine in the bladder.  Will exchange suprapubic catheter.  The catheter that is in now is a 24 [place]                                 SBIRT 20yo+      Flowsheet Row Most Recent Value   Initial Alcohol Screen: US AUDIT-C     1. How often do you have a drink containing alcohol? 0 Filed at: 09/07/2024 1539   2. How many drinks containing alcohol do you have on a typical day you are drinking?  0 Filed at: 09/07/2024 1539   3b. FEMALE Any Age, or MALE 65+: How often do you have 4 or more drinks on one occassion? 0 Filed at: 09/07/2024 1539   Audit-C Score 0 Filed at: 09/07/2024 1539   ALPESH: How many times in the past year have you...    Used an illegal drug or used a prescription medication for non-medical reasons? Never Filed at: 09/07/2024 1539                       Medical Decision Making  74-year-old male with history of neurogenic bladder with chronic suprapubic catheter currently 24 [place] presents to the emergency department with malfunctioning catheter.  He states it was functioning normally as of yesterday and then this morning he noticed suprapubic pain and feels the catheter is not draining.  On exam he is alert he is in no acute distress.  The catheter site is without erythema, drainage.  There is some urine in the bag.  He does have some mild suprapubic tenderness on exam.  He is also mildly tachycardic on exam but on review of the records he does have a history of tachycardia on previous exams.  Will exchange catheter here.                 Disposition  Final diagnoses:   None     ED Disposition       None          Follow-up Information    None         Patient's Medications   Discharge Prescriptions    No medications on file       No discharge procedures on file.    PDMP Review         Value Time User    PDMP Reviewed  Yes 4/1/2024  3:21 AM Ar Beltran DO            ED Provider  Electronically Signed by             Yocasta Zavala DO  09/07/24 7899

## 2024-09-10 LAB
BACTERIA UR CULT: ABNORMAL
BACTERIA UR CULT: ABNORMAL

## 2024-10-05 ENCOUNTER — HOSPITAL ENCOUNTER (INPATIENT)
Facility: HOSPITAL | Age: 74
LOS: 4 days | Discharge: HOME | DRG: 698 | End: 2024-10-10
Attending: EMERGENCY MEDICINE | Admitting: STUDENT IN AN ORGANIZED HEALTH CARE EDUCATION/TRAINING PROGRAM
Payer: MEDICARE

## 2024-10-05 ENCOUNTER — APPOINTMENT (EMERGENCY)
Dept: CT IMAGING | Facility: HOSPITAL | Age: 74
DRG: 698 | End: 2024-10-05
Payer: MEDICARE

## 2024-10-05 DIAGNOSIS — K59.00 CONSTIPATION, UNSPECIFIED CONSTIPATION TYPE: ICD-10-CM

## 2024-10-05 DIAGNOSIS — R10.9 ABDOMINAL PAIN: Primary | ICD-10-CM

## 2024-10-05 DIAGNOSIS — R33.9 URINARY RETENTION: ICD-10-CM

## 2024-10-05 DIAGNOSIS — N31.9 NEUROGENIC BLADDER: ICD-10-CM

## 2024-10-05 DIAGNOSIS — N39.0 URINARY TRACT INFECTION: ICD-10-CM

## 2024-10-05 LAB
2HR DELTA HS TROPONIN: -1 NG/L
ALBUMIN SERPL BCG-MCNC: 4.3 G/DL (ref 3.5–5)
ALP SERPL-CCNC: 91 U/L (ref 34–104)
ALT SERPL W P-5'-P-CCNC: 11 U/L (ref 7–52)
ANION GAP SERPL CALCULATED.3IONS-SCNC: 13 MMOL/L (ref 4–13)
AST SERPL W P-5'-P-CCNC: 15 U/L (ref 13–39)
BACTERIA UR QL AUTO: ABNORMAL /HPF
BASOPHILS # BLD AUTO: 0.11 THOUSANDS/ΜL (ref 0–0.1)
BASOPHILS NFR BLD AUTO: 1 % (ref 0–1)
BILIRUB DIRECT SERPL-MCNC: 0.14 MG/DL (ref 0–0.2)
BILIRUB SERPL-MCNC: 0.43 MG/DL (ref 0.2–1)
BILIRUB UR QL STRIP: NEGATIVE
BUDDING YEAST: PRESENT
BUN SERPL-MCNC: 18 MG/DL (ref 5–25)
CALCIUM SERPL-MCNC: 10.4 MG/DL (ref 8.4–10.2)
CARDIAC TROPONIN I PNL SERPL HS: 4 NG/L
CARDIAC TROPONIN I PNL SERPL HS: 5 NG/L
CHLORIDE SERPL-SCNC: 101 MMOL/L (ref 96–108)
CLARITY UR: ABNORMAL
CO2 SERPL-SCNC: 22 MMOL/L (ref 21–32)
COLOR UR: YELLOW
CREAT SERPL-MCNC: 0.95 MG/DL (ref 0.6–1.3)
EOSINOPHIL # BLD AUTO: 0.57 THOUSAND/ΜL (ref 0–0.61)
EOSINOPHIL NFR BLD AUTO: 3 % (ref 0–6)
ERYTHROCYTE [DISTWIDTH] IN BLOOD BY AUTOMATED COUNT: 15.9 % (ref 11.6–15.1)
GFR SERPL CREATININE-BSD FRML MDRD: 77 ML/MIN/1.73SQ M
GLUCOSE SERPL-MCNC: 153 MG/DL (ref 65–140)
GLUCOSE UR STRIP-MCNC: ABNORMAL MG/DL
HCT VFR BLD AUTO: 51.2 % (ref 36.5–49.3)
HGB BLD-MCNC: 16.5 G/DL (ref 12–17)
HGB UR QL STRIP.AUTO: ABNORMAL
IMM GRANULOCYTES # BLD AUTO: 0.1 THOUSAND/UL (ref 0–0.2)
IMM GRANULOCYTES NFR BLD AUTO: 1 % (ref 0–2)
KETONES UR STRIP-MCNC: NEGATIVE MG/DL
LACTATE SERPL-SCNC: 1.3 MMOL/L (ref 0.5–2)
LACTATE SERPL-SCNC: 2.3 MMOL/L (ref 0.5–2)
LEUKOCYTE ESTERASE UR QL STRIP: ABNORMAL
LIPASE SERPL-CCNC: 24 U/L (ref 11–82)
LYMPHOCYTES # BLD AUTO: 2.72 THOUSANDS/ΜL (ref 0.6–4.47)
LYMPHOCYTES NFR BLD AUTO: 14 % (ref 14–44)
MAGNESIUM SERPL-MCNC: 1.5 MG/DL (ref 1.9–2.7)
MCH RBC QN AUTO: 27.1 PG (ref 26.8–34.3)
MCHC RBC AUTO-ENTMCNC: 32.2 G/DL (ref 31.4–37.4)
MCV RBC AUTO: 84 FL (ref 82–98)
MONOCYTES # BLD AUTO: 1.51 THOUSAND/ΜL (ref 0.17–1.22)
MONOCYTES NFR BLD AUTO: 8 % (ref 4–12)
NEUTROPHILS # BLD AUTO: 15.14 THOUSANDS/ΜL (ref 1.85–7.62)
NEUTS SEG NFR BLD AUTO: 73 % (ref 43–75)
NITRITE UR QL STRIP: NEGATIVE
NON-SQ EPI CELLS URNS QL MICRO: ABNORMAL /HPF
NRBC BLD AUTO-RTO: 0 /100 WBCS
PH UR STRIP.AUTO: 6 [PH]
PLATELET # BLD AUTO: 386 THOUSANDS/UL (ref 149–390)
PMV BLD AUTO: 8.6 FL (ref 8.9–12.7)
POTASSIUM SERPL-SCNC: 4.1 MMOL/L (ref 3.5–5.3)
PROCALCITONIN SERPL-MCNC: 0.07 NG/ML
PROT SERPL-MCNC: 8.3 G/DL (ref 6.4–8.4)
PROT UR STRIP-MCNC: ABNORMAL MG/DL
RBC # BLD AUTO: 6.08 MILLION/UL (ref 3.88–5.62)
RBC #/AREA URNS AUTO: ABNORMAL /HPF
SODIUM SERPL-SCNC: 136 MMOL/L (ref 135–147)
SP GR UR STRIP.AUTO: 1.01 (ref 1–1.03)
TSH SERPL DL<=0.05 MIU/L-ACNC: 1.16 UIU/ML (ref 0.45–4.5)
UROBILINOGEN UR STRIP-ACNC: <2 MG/DL
WBC # BLD AUTO: 20.15 THOUSAND/UL (ref 4.31–10.16)
WBC #/AREA URNS AUTO: ABNORMAL /HPF

## 2024-10-05 PROCEDURE — 93005 ELECTROCARDIOGRAM TRACING: CPT

## 2024-10-05 PROCEDURE — 83735 ASSAY OF MAGNESIUM: CPT | Performed by: EMERGENCY MEDICINE

## 2024-10-05 PROCEDURE — 80048 BASIC METABOLIC PNL TOTAL CA: CPT | Performed by: EMERGENCY MEDICINE

## 2024-10-05 PROCEDURE — 83605 ASSAY OF LACTIC ACID: CPT | Performed by: EMERGENCY MEDICINE

## 2024-10-05 PROCEDURE — 87040 BLOOD CULTURE FOR BACTERIA: CPT | Performed by: EMERGENCY MEDICINE

## 2024-10-05 PROCEDURE — 96361 HYDRATE IV INFUSION ADD-ON: CPT

## 2024-10-05 PROCEDURE — 81001 URINALYSIS AUTO W/SCOPE: CPT | Performed by: EMERGENCY MEDICINE

## 2024-10-05 PROCEDURE — 96365 THER/PROPH/DIAG IV INF INIT: CPT

## 2024-10-05 PROCEDURE — 84145 PROCALCITONIN (PCT): CPT | Performed by: EMERGENCY MEDICINE

## 2024-10-05 PROCEDURE — 84443 ASSAY THYROID STIM HORMONE: CPT | Performed by: EMERGENCY MEDICINE

## 2024-10-05 PROCEDURE — 80076 HEPATIC FUNCTION PANEL: CPT | Performed by: EMERGENCY MEDICINE

## 2024-10-05 PROCEDURE — 87186 SC STD MICRODIL/AGAR DIL: CPT | Performed by: EMERGENCY MEDICINE

## 2024-10-05 PROCEDURE — 99285 EMERGENCY DEPT VISIT HI MDM: CPT

## 2024-10-05 PROCEDURE — 84484 ASSAY OF TROPONIN QUANT: CPT | Performed by: EMERGENCY MEDICINE

## 2024-10-05 PROCEDURE — 96375 TX/PRO/DX INJ NEW DRUG ADDON: CPT

## 2024-10-05 PROCEDURE — 36415 COLL VENOUS BLD VENIPUNCTURE: CPT | Performed by: EMERGENCY MEDICINE

## 2024-10-05 PROCEDURE — 85025 COMPLETE CBC W/AUTO DIFF WBC: CPT | Performed by: EMERGENCY MEDICINE

## 2024-10-05 PROCEDURE — 96374 THER/PROPH/DIAG INJ IV PUSH: CPT

## 2024-10-05 PROCEDURE — 83690 ASSAY OF LIPASE: CPT | Performed by: EMERGENCY MEDICINE

## 2024-10-05 PROCEDURE — 99285 EMERGENCY DEPT VISIT HI MDM: CPT | Performed by: EMERGENCY MEDICINE

## 2024-10-05 PROCEDURE — 87077 CULTURE AEROBIC IDENTIFY: CPT | Performed by: EMERGENCY MEDICINE

## 2024-10-05 PROCEDURE — 87086 URINE CULTURE/COLONY COUNT: CPT | Performed by: EMERGENCY MEDICINE

## 2024-10-05 PROCEDURE — 74177 CT ABD & PELVIS W/CONTRAST: CPT

## 2024-10-05 RX ORDER — HYDROMORPHONE HCL/PF 1 MG/ML
0.5 SYRINGE (ML) INJECTION ONCE
Status: COMPLETED | OUTPATIENT
Start: 2024-10-05 | End: 2024-10-05

## 2024-10-05 RX ORDER — LEVOFLOXACIN 5 MG/ML
750 INJECTION, SOLUTION INTRAVENOUS ONCE
Status: COMPLETED | OUTPATIENT
Start: 2024-10-06 | End: 2024-10-06

## 2024-10-05 RX ORDER — ONDANSETRON 2 MG/ML
4 INJECTION INTRAMUSCULAR; INTRAVENOUS ONCE
Status: COMPLETED | OUTPATIENT
Start: 2024-10-05 | End: 2024-10-05

## 2024-10-05 RX ADMIN — SODIUM CHLORIDE 500 ML: 0.9 INJECTION, SOLUTION INTRAVENOUS at 20:43

## 2024-10-05 RX ADMIN — HYDROMORPHONE HYDROCHLORIDE 0.5 MG: 1 INJECTION, SOLUTION INTRAMUSCULAR; INTRAVENOUS; SUBCUTANEOUS at 20:48

## 2024-10-05 RX ADMIN — IOHEXOL 100 ML: 350 INJECTION, SOLUTION INTRAVENOUS at 22:51

## 2024-10-05 RX ADMIN — ONDANSETRON 4 MG: 2 INJECTION INTRAMUSCULAR; INTRAVENOUS at 20:44

## 2024-10-05 RX ADMIN — CEFEPIME 2000 MG: 2 INJECTION, POWDER, FOR SOLUTION INTRAVENOUS at 21:30

## 2024-10-06 LAB
ANION GAP SERPL CALCULATED.3IONS-SCNC: 10 MMOL/L (ref 4–13)
ATRIAL RATE: 109 BPM
ATRIAL RATE: 97 BPM
BASOPHILS # BLD AUTO: 0.08 THOUSANDS/ΜL (ref 0–0.1)
BASOPHILS NFR BLD AUTO: 1 % (ref 0–1)
BUN SERPL-MCNC: 15 MG/DL (ref 5–25)
CALCIUM SERPL-MCNC: 9.3 MG/DL (ref 8.4–10.2)
CHLORIDE SERPL-SCNC: 103 MMOL/L (ref 96–108)
CO2 SERPL-SCNC: 22 MMOL/L (ref 21–32)
CREAT SERPL-MCNC: 0.83 MG/DL (ref 0.6–1.3)
EOSINOPHIL # BLD AUTO: 0.34 THOUSAND/ΜL (ref 0–0.61)
EOSINOPHIL NFR BLD AUTO: 2 % (ref 0–6)
ERYTHROCYTE [DISTWIDTH] IN BLOOD BY AUTOMATED COUNT: 15.1 % (ref 11.6–15.1)
EST. AVERAGE GLUCOSE BLD GHB EST-MCNC: 212 MG/DL
GFR SERPL CREATININE-BSD FRML MDRD: 86 ML/MIN/1.73SQ M
GLUCOSE SERPL-MCNC: 113 MG/DL (ref 65–140)
GLUCOSE SERPL-MCNC: 115 MG/DL (ref 65–140)
GLUCOSE SERPL-MCNC: 124 MG/DL (ref 65–140)
GLUCOSE SERPL-MCNC: 133 MG/DL (ref 65–140)
GLUCOSE SERPL-MCNC: 167 MG/DL (ref 65–140)
HBA1C MFR BLD: 9 %
HCT VFR BLD AUTO: 42.5 % (ref 36.5–49.3)
HGB BLD-MCNC: 13.8 G/DL (ref 12–17)
IMM GRANULOCYTES # BLD AUTO: 0.07 THOUSAND/UL (ref 0–0.2)
IMM GRANULOCYTES NFR BLD AUTO: 1 % (ref 0–2)
LYMPHOCYTES # BLD AUTO: 2.44 THOUSANDS/ΜL (ref 0.6–4.47)
LYMPHOCYTES NFR BLD AUTO: 17 % (ref 14–44)
MCH RBC QN AUTO: 27 PG (ref 26.8–34.3)
MCHC RBC AUTO-ENTMCNC: 32.5 G/DL (ref 31.4–37.4)
MCV RBC AUTO: 83 FL (ref 82–98)
MONOCYTES # BLD AUTO: 1.17 THOUSAND/ΜL (ref 0.17–1.22)
MONOCYTES NFR BLD AUTO: 8 % (ref 4–12)
NEUTROPHILS # BLD AUTO: 10.42 THOUSANDS/ΜL (ref 1.85–7.62)
NEUTS SEG NFR BLD AUTO: 71 % (ref 43–75)
NRBC BLD AUTO-RTO: 0 /100 WBCS
P AXIS: 55 DEGREES
P AXIS: 64 DEGREES
PLATELET # BLD AUTO: 321 THOUSANDS/UL (ref 149–390)
PMV BLD AUTO: 8.7 FL (ref 8.9–12.7)
POTASSIUM SERPL-SCNC: 4 MMOL/L (ref 3.5–5.3)
PR INTERVAL: 166 MS
PR INTERVAL: 166 MS
QRS AXIS: 67 DEGREES
QRS AXIS: 74 DEGREES
QRSD INTERVAL: 92 MS
QRSD INTERVAL: 94 MS
QT INTERVAL: 352 MS
QT INTERVAL: 368 MS
QTC INTERVAL: 467 MS
QTC INTERVAL: 474 MS
RBC # BLD AUTO: 5.11 MILLION/UL (ref 3.88–5.62)
SODIUM SERPL-SCNC: 135 MMOL/L (ref 135–147)
T WAVE AXIS: 33 DEGREES
T WAVE AXIS: 51 DEGREES
VENTRICULAR RATE: 109 BPM
VENTRICULAR RATE: 97 BPM
WBC # BLD AUTO: 14.52 THOUSAND/UL (ref 4.31–10.16)

## 2024-10-06 PROCEDURE — 82948 REAGENT STRIP/BLOOD GLUCOSE: CPT

## 2024-10-06 PROCEDURE — 80048 BASIC METABOLIC PNL TOTAL CA: CPT

## 2024-10-06 PROCEDURE — 85025 COMPLETE CBC W/AUTO DIFF WBC: CPT

## 2024-10-06 PROCEDURE — 99222 1ST HOSP IP/OBS MODERATE 55: CPT | Performed by: STUDENT IN AN ORGANIZED HEALTH CARE EDUCATION/TRAINING PROGRAM

## 2024-10-06 PROCEDURE — 96367 TX/PROPH/DG ADDL SEQ IV INF: CPT

## 2024-10-06 PROCEDURE — 83036 HEMOGLOBIN GLYCOSYLATED A1C: CPT

## 2024-10-06 PROCEDURE — 93010 ELECTROCARDIOGRAM REPORT: CPT | Performed by: INTERNAL MEDICINE

## 2024-10-06 RX ORDER — OXYCODONE HYDROCHLORIDE 10 MG/1
10 TABLET ORAL EVERY 6 HOURS PRN
Status: DISCONTINUED | OUTPATIENT
Start: 2024-10-06 | End: 2024-10-10 | Stop reason: HOSPADM

## 2024-10-06 RX ORDER — MIRTAZAPINE 7.5 MG/1
7.5 TABLET, FILM COATED ORAL
Status: DISCONTINUED | OUTPATIENT
Start: 2024-10-06 | End: 2024-10-10 | Stop reason: HOSPADM

## 2024-10-06 RX ORDER — BISACODYL 10 MG
10 SUPPOSITORY, RECTAL RECTAL DAILY PRN
Status: DISCONTINUED | OUTPATIENT
Start: 2024-10-06 | End: 2024-10-10 | Stop reason: HOSPADM

## 2024-10-06 RX ORDER — ENOXAPARIN SODIUM 100 MG/ML
40 INJECTION SUBCUTANEOUS DAILY
Status: DISCONTINUED | OUTPATIENT
Start: 2024-10-06 | End: 2024-10-10 | Stop reason: HOSPADM

## 2024-10-06 RX ORDER — ATORVASTATIN CALCIUM 80 MG/1
80 TABLET, FILM COATED ORAL DAILY
Status: DISCONTINUED | OUTPATIENT
Start: 2024-10-06 | End: 2024-10-10 | Stop reason: HOSPADM

## 2024-10-06 RX ORDER — AMLODIPINE BESYLATE 10 MG/1
10 TABLET ORAL DAILY
Status: DISCONTINUED | OUTPATIENT
Start: 2024-10-06 | End: 2024-10-10 | Stop reason: HOSPADM

## 2024-10-06 RX ORDER — MECLIZINE HCL 12.5 MG 12.5 MG/1
12.5 TABLET ORAL EVERY 8 HOURS PRN
Status: DISCONTINUED | OUTPATIENT
Start: 2024-10-06 | End: 2024-10-10 | Stop reason: HOSPADM

## 2024-10-06 RX ORDER — ALBUTEROL SULFATE 0.83 MG/ML
2.5 SOLUTION RESPIRATORY (INHALATION) EVERY 6 HOURS PRN
Status: DISCONTINUED | OUTPATIENT
Start: 2024-10-06 | End: 2024-10-10 | Stop reason: HOSPADM

## 2024-10-06 RX ORDER — BUDESONIDE AND FORMOTEROL FUMARATE DIHYDRATE 160; 4.5 UG/1; UG/1
2 AEROSOL RESPIRATORY (INHALATION) 2 TIMES DAILY
Status: DISCONTINUED | OUTPATIENT
Start: 2024-10-06 | End: 2024-10-10 | Stop reason: HOSPADM

## 2024-10-06 RX ORDER — SODIUM CHLORIDE, SODIUM GLUCONATE, SODIUM ACETATE, POTASSIUM CHLORIDE, MAGNESIUM CHLORIDE, SODIUM PHOSPHATE, DIBASIC, AND POTASSIUM PHOSPHATE .53; .5; .37; .037; .03; .012; .00082 G/100ML; G/100ML; G/100ML; G/100ML; G/100ML; G/100ML; G/100ML
50 INJECTION, SOLUTION INTRAVENOUS CONTINUOUS
Status: DISPENSED | OUTPATIENT
Start: 2024-10-06 | End: 2024-10-06

## 2024-10-06 RX ORDER — KETOROLAC TROMETHAMINE 30 MG/ML
15 INJECTION, SOLUTION INTRAMUSCULAR; INTRAVENOUS ONCE
Status: COMPLETED | OUTPATIENT
Start: 2024-10-06 | End: 2024-10-06

## 2024-10-06 RX ORDER — PANTOPRAZOLE SODIUM 40 MG/1
40 TABLET, DELAYED RELEASE ORAL
Status: DISCONTINUED | OUTPATIENT
Start: 2024-10-06 | End: 2024-10-10 | Stop reason: HOSPADM

## 2024-10-06 RX ORDER — INSULIN LISPRO 100 [IU]/ML
1-5 INJECTION, SOLUTION INTRAVENOUS; SUBCUTANEOUS
Status: DISCONTINUED | OUTPATIENT
Start: 2024-10-06 | End: 2024-10-10 | Stop reason: HOSPADM

## 2024-10-06 RX ORDER — SODIUM CHLORIDE, SODIUM GLUCONATE, SODIUM ACETATE, POTASSIUM CHLORIDE, MAGNESIUM CHLORIDE, SODIUM PHOSPHATE, DIBASIC, AND POTASSIUM PHOSPHATE .53; .5; .37; .037; .03; .012; .00082 G/100ML; G/100ML; G/100ML; G/100ML; G/100ML; G/100ML; G/100ML
50 INJECTION, SOLUTION INTRAVENOUS CONTINUOUS
Status: DISCONTINUED | OUTPATIENT
Start: 2024-10-06 | End: 2024-10-06

## 2024-10-06 RX ORDER — LIDOCAINE 50 MG/G
1 PATCH TOPICAL DAILY
Status: DISCONTINUED | OUTPATIENT
Start: 2024-10-06 | End: 2024-10-10 | Stop reason: HOSPADM

## 2024-10-06 RX ORDER — MAGNESIUM SULFATE HEPTAHYDRATE 40 MG/ML
2 INJECTION, SOLUTION INTRAVENOUS ONCE
Status: COMPLETED | OUTPATIENT
Start: 2024-10-06 | End: 2024-10-06

## 2024-10-06 RX ORDER — ACETAMINOPHEN 325 MG/1
975 TABLET ORAL EVERY 8 HOURS SCHEDULED
Status: DISCONTINUED | OUTPATIENT
Start: 2024-10-06 | End: 2024-10-10 | Stop reason: HOSPADM

## 2024-10-06 RX ORDER — LEVOFLOXACIN 5 MG/ML
750 INJECTION, SOLUTION INTRAVENOUS EVERY 24 HOURS
Status: DISCONTINUED | OUTPATIENT
Start: 2024-10-07 | End: 2024-10-07

## 2024-10-06 RX ORDER — LANOLIN ALCOHOL/MO/W.PET/CERES
3 CREAM (GRAM) TOPICAL
Status: DISCONTINUED | OUTPATIENT
Start: 2024-10-06 | End: 2024-10-10 | Stop reason: HOSPADM

## 2024-10-06 RX ORDER — AMOXICILLIN 250 MG
2 CAPSULE ORAL
Status: DISCONTINUED | OUTPATIENT
Start: 2024-10-06 | End: 2024-10-10 | Stop reason: HOSPADM

## 2024-10-06 RX ORDER — CLOPIDOGREL BISULFATE 75 MG/1
75 TABLET ORAL DAILY
Status: DISCONTINUED | OUTPATIENT
Start: 2024-10-06 | End: 2024-10-10 | Stop reason: HOSPADM

## 2024-10-06 RX ORDER — GABAPENTIN 300 MG/1
600 CAPSULE ORAL
Status: DISCONTINUED | OUTPATIENT
Start: 2024-10-06 | End: 2024-10-10 | Stop reason: HOSPADM

## 2024-10-06 RX ORDER — POLYETHYLENE GLYCOL 3350 17 G/17G
17 POWDER, FOR SOLUTION ORAL 2 TIMES DAILY
Status: DISCONTINUED | OUTPATIENT
Start: 2024-10-06 | End: 2024-10-10 | Stop reason: HOSPADM

## 2024-10-06 RX ORDER — OXYCODONE HYDROCHLORIDE 5 MG/1
5 TABLET ORAL EVERY 6 HOURS PRN
Status: DISCONTINUED | OUTPATIENT
Start: 2024-10-06 | End: 2024-10-06

## 2024-10-06 RX ORDER — METHENAMINE HIPPURATE 1000 MG/1
1 TABLET ORAL 2 TIMES DAILY WITH MEALS
Status: DISCONTINUED | OUTPATIENT
Start: 2024-10-06 | End: 2024-10-10 | Stop reason: HOSPADM

## 2024-10-06 RX ORDER — OXYCODONE HYDROCHLORIDE 5 MG/1
5 TABLET ORAL EVERY 6 HOURS PRN
Status: DISCONTINUED | OUTPATIENT
Start: 2024-10-06 | End: 2024-10-10 | Stop reason: HOSPADM

## 2024-10-06 RX ORDER — BACLOFEN 10 MG/1
5 TABLET ORAL 3 TIMES DAILY
Status: DISCONTINUED | OUTPATIENT
Start: 2024-10-06 | End: 2024-10-10 | Stop reason: HOSPADM

## 2024-10-06 RX ORDER — PROPRANOLOL HCL 60 MG
60 CAPSULE, EXTENDED RELEASE 24HR ORAL DAILY
Status: DISCONTINUED | OUTPATIENT
Start: 2024-10-06 | End: 2024-10-10 | Stop reason: HOSPADM

## 2024-10-06 RX ORDER — GABAPENTIN 300 MG/1
300 CAPSULE ORAL
Status: DISCONTINUED | OUTPATIENT
Start: 2024-10-06 | End: 2024-10-10 | Stop reason: HOSPADM

## 2024-10-06 RX ORDER — BISACODYL 10 MG
10 SUPPOSITORY, RECTAL RECTAL DAILY
Status: COMPLETED | OUTPATIENT
Start: 2024-10-06 | End: 2024-10-07

## 2024-10-06 RX ORDER — ACETAMINOPHEN 325 MG/1
650 TABLET ORAL EVERY 6 HOURS PRN
Status: DISCONTINUED | OUTPATIENT
Start: 2024-10-06 | End: 2024-10-06

## 2024-10-06 RX ORDER — CROMOLYN SODIUM 5.2 MG
1 AEROSOL, SPRAY WITH PUMP (ML) NASAL 3 TIMES DAILY
Status: DISCONTINUED | OUTPATIENT
Start: 2024-10-06 | End: 2024-10-08 | Stop reason: RX

## 2024-10-06 RX ADMIN — OXYCODONE 5 MG: 5 TABLET ORAL at 03:00

## 2024-10-06 RX ADMIN — PANTOPRAZOLE SODIUM 40 MG: 40 TABLET, DELAYED RELEASE ORAL at 11:30

## 2024-10-06 RX ADMIN — METHENAMINE HIPPURATE 1 G: 1000 TABLET ORAL at 07:56

## 2024-10-06 RX ADMIN — MECLIZINE HYDROCHLORIDE 12.5 MG: 12.5 TABLET ORAL at 11:30

## 2024-10-06 RX ADMIN — GABAPENTIN 300 MG: 300 CAPSULE ORAL at 09:31

## 2024-10-06 RX ADMIN — CLOPIDOGREL BISULFATE 75 MG: 75 TABLET ORAL at 09:31

## 2024-10-06 RX ADMIN — MIRTAZAPINE 7.5 MG: 7.5 TABLET, FILM COATED ORAL at 21:41

## 2024-10-06 RX ADMIN — ACETAMINOPHEN 975 MG: 325 TABLET, FILM COATED ORAL at 21:41

## 2024-10-06 RX ADMIN — SENNOSIDES AND DOCUSATE SODIUM 2 TABLET: 50; 8.6 TABLET ORAL at 21:41

## 2024-10-06 RX ADMIN — BACLOFEN 5 MG: 10 TABLET ORAL at 09:31

## 2024-10-06 RX ADMIN — INSULIN LISPRO 1 UNITS: 100 INJECTION, SOLUTION INTRAVENOUS; SUBCUTANEOUS at 22:21

## 2024-10-06 RX ADMIN — SODIUM CHLORIDE, SODIUM GLUCONATE, SODIUM ACETATE, POTASSIUM CHLORIDE, MAGNESIUM CHLORIDE, SODIUM PHOSPHATE, DIBASIC, AND POTASSIUM PHOSPHATE 50 ML/HR: .53; .5; .37; .037; .03; .012; .00082 INJECTION, SOLUTION INTRAVENOUS at 02:56

## 2024-10-06 RX ADMIN — GABAPENTIN 600 MG: 300 CAPSULE ORAL at 21:41

## 2024-10-06 RX ADMIN — OXYCODONE 5 MG: 5 TABLET ORAL at 11:34

## 2024-10-06 RX ADMIN — ENOXAPARIN SODIUM 40 MG: 40 INJECTION SUBCUTANEOUS at 09:31

## 2024-10-06 RX ADMIN — LEVOFLOXACIN 750 MG: 750 INJECTION, SOLUTION INTRAVENOUS at 00:10

## 2024-10-06 RX ADMIN — ACETAMINOPHEN 975 MG: 325 TABLET, FILM COATED ORAL at 14:46

## 2024-10-06 RX ADMIN — GABAPENTIN 300 MG: 300 CAPSULE ORAL at 13:33

## 2024-10-06 RX ADMIN — KETOROLAC TROMETHAMINE 15 MG: 30 INJECTION, SOLUTION INTRAMUSCULAR; INTRAVENOUS at 14:46

## 2024-10-06 RX ADMIN — POLYETHYLENE GLYCOL 3350 17 G: 17 POWDER, FOR SOLUTION ORAL at 18:41

## 2024-10-06 RX ADMIN — BISACODYL 10 MG: 10 SUPPOSITORY RECTAL at 09:31

## 2024-10-06 RX ADMIN — ATORVASTATIN CALCIUM 80 MG: 80 TABLET, FILM COATED ORAL at 09:31

## 2024-10-06 RX ADMIN — BUDESONIDE AND FORMOTEROL FUMARATE DIHYDRATE 2 PUFF: 160; 4.5 AEROSOL RESPIRATORY (INHALATION) at 18:41

## 2024-10-06 RX ADMIN — AMLODIPINE BESYLATE 10 MG: 10 TABLET ORAL at 09:31

## 2024-10-06 RX ADMIN — BUDESONIDE AND FORMOTEROL FUMARATE DIHYDRATE 2 PUFF: 160; 4.5 AEROSOL RESPIRATORY (INHALATION) at 09:32

## 2024-10-06 RX ADMIN — POLYETHYLENE GLYCOL 3350 17 G: 17 POWDER, FOR SOLUTION ORAL at 09:31

## 2024-10-06 RX ADMIN — LIDOCAINE 1 PATCH: 700 PATCH TOPICAL at 11:30

## 2024-10-06 RX ADMIN — BACLOFEN 5 MG: 10 TABLET ORAL at 21:41

## 2024-10-06 RX ADMIN — METHENAMINE HIPPURATE 1 G: 1000 TABLET ORAL at 16:54

## 2024-10-06 RX ADMIN — MAGNESIUM SULFATE HEPTAHYDRATE 2 G: 40 INJECTION, SOLUTION INTRAVENOUS at 02:56

## 2024-10-06 RX ADMIN — PROPRANOLOL HYDROCHLORIDE 60 MG: 60 CAPSULE, EXTENDED RELEASE ORAL at 09:32

## 2024-10-06 RX ADMIN — BACLOFEN 5 MG: 10 TABLET ORAL at 16:54

## 2024-10-06 NOTE — ASSESSMENT & PLAN NOTE
Wt Readings from Last 3 Encounters:   10/06/24 68.5 kg (151 lb 0.2 oz)   09/07/24 69.9 kg (154 lb 1.6 oz)   08/31/24 71.3 kg (157 lb 3 oz)     Pt appears euvolemic on exam  Last ECHO 10/18 EF 75% with grade 1 DD  Monitor daily weights, I/Os

## 2024-10-06 NOTE — H&P
"H&P - Hospitalist   Name: Mathew Rico 74 y.o. male I MRN: [id number]  Unit/Bed#: E5 -01 I Date of Admission: 10/5/2024   Date of Service: 10/6/2024 I Hospital Day: 0     Assessment & Plan  Sepsis due to urinary tract infection  (HCC)  Pt with PMHx of multiple sclerosis with neurogenic bladder and chronic SPT tube, diastolic CHF, CVA, diabetic neuropathy, hypertension, diabetes, and GERSON presented to the ED secondary to abdominal pain  Pt suprapubic cath found to be blocked with sediment. This was resolved in the ED with flushing, and patient abdominal pain subsequently improved  Pt also fulfilling sepsis criteria with leukocytosis of 20.15 and tachycardia  Lactic 2.3->1.3  Procalc 0.07, repeat in AM  Septic source: UTI in the setting of suprapubic cath  CT a/p demonstrating \"Findings above highly suspicious for cystitis in the setting of indwelling suprapubic catheter.\"  UA with evidence of infection   Urine culture pending   Last urine culture from 9/7 growing enterobacter cloacae susceptible to Levaquin, resistant to cephalosporins    Pt initially received cefepime in the ED which was adjusted to Levaquin following culture review  Will continue with Levaquin pending new urine culture results  Pt given 500mL bolus in the ED, will continue with IV fluids  Constipation  CT a/p demonstrating \"Fecal retention with large amount of stool within the rectal vault. There is associated wall thickening of the rectum and adjacent perirectal fatty stranding somewhat similar in appearance to the prior\"  Continue bowel regimen   Hyperlipidemia  Continue statin  Primary hypertension  Continue amlodipine  Patient on propanolol with history of essential tremor  History of CVA (cerebrovascular accident)  History of left hemisphere lacunar infarct in 2018  Continue Plavix and statin therapy  Chronic diastolic congestive heart failure (HCC)  Wt Readings from Last 3 Encounters:   10/06/24 68.5 kg (151 lb 0.2 oz)   09/07/24 " "69.9 kg (154 lb 1.6 oz)   08/31/24 71.3 kg (157 lb 3 oz)     Pt appears euvolemic on exam  Last ECHO 10/18 EF 75% with grade 1 DD  Monitor daily weights, I/Os        GERSON on CPAP  Continue cpap h.s.  Type 2 diabetes mellitus without complication, without long-term current use of insulin (HCC)  Lab Results   Component Value Date    HGBA1C 8.0 (H) 06/16/2024       No results for input(s): \"POCGLU\" in the last 72 hours.    Blood Sugar Average: Last 72 hrs:  Hold metformin  SSI with accucheks    Multiple sclerosis (HCC)  S/p suprapubic cath for neurogenic bladder  Pt is primarily bedbound at baseline  Continue baclofen  Continue outpatient neurologic follow-up       VTE Pharmacologic Prophylaxis: VTE Score: 7 High Risk (Score >/= 5) - Pharmacological DVT Prophylaxis Ordered: enoxaparin (Lovenox). Sequential Compression Devices Ordered.  Code Status: Level 1 - Full Code   Discussion with family:  Update in AM.     Anticipated Length of Stay: Patient will be admitted on an inpatient basis with an anticipated length of stay of greater than 2 midnights secondary to sepsis secondary to UTI.    History of Present Illness   Chief Complaint: \"I was having really bad abdominal pain\"    Mathew Rico is a 74 y.o. male who presents with sepsis secondary to UTI.  Patient reports that he presented to the ED secondary to diffuse abdominal pain and nausea.  He reports that he had some relief after they unblocked his suprapubic catheter in the ED, however does continue to have mild diffuse abdominal pain.  Patient found to be meeting SIRS in the ED and found to have UTI.  Patient denies any fevers or chills at home.  Patient reports that he now has a mild headache.  Denies any other complaints at this time such as chest pain, palpitations, shortness of breath, diarrhea, or pedal edema.    Review of Systems   Constitutional:  Negative for appetite change, chills, diaphoresis, fatigue and fever.   HENT:  Negative for congestion, " rhinorrhea and sore throat.    Eyes:  Negative for photophobia and visual disturbance.   Respiratory:  Negative for cough, shortness of breath and wheezing.    Cardiovascular:  Negative for chest pain, palpitations and leg swelling.   Gastrointestinal:  Positive for abdominal pain and nausea. Negative for abdominal distention, blood in stool, constipation, diarrhea and vomiting.   Genitourinary:  Negative for dysuria and hematuria.   Musculoskeletal:  Negative for arthralgias, back pain, myalgias and neck stiffness.   Skin:  Negative for color change and rash.   Neurological:  Positive for headaches. Negative for dizziness, seizures, syncope, weakness and light-headedness.   Psychiatric/Behavioral:  Negative for agitation, behavioral problems and confusion. The patient is not nervous/anxious.    All other systems reviewed and are negative.      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[facility])     Bladder infection     Bronchitis     Constipation     Cough     Diabetes ([facility])     Diabetes mellitus ([facility])     Dizziness     Dysfunction of eustachian tube     Erectile dysfunction of non-organic origin     Fatigue     Glaucoma     Hiatal hernia     Hypertension     Imbalance     Leg muscle spasm     Leukocytosis 04/01/2024    MS (multiple sclerosis) ([facility])     Multiple sclerosis ([facility])     Nephrolithiasis     Neurogenic bladder     No natural teeth     Sinus pain     Spinal stenosis     Strain of thoracic region     Stroke ([facility])     Suprapubic catheter ([facility])      Past Surgical History:   Procedure Laterality Date    APPENDECTOMY      BRAIN SURGERY      Coil placed in aneurysm    CEREBRAL ANEURYSM REPAIR      CYSTOSCOPY      CYSTOSCOPY      CYSTOSCOPY  06/11/2018    CYSTOSCOPY  01/15/2021    EYE SURGERY      transscleral cyclophotocoagulation noncontact YAG laser     IR SUPRAPUBIC CATHETER CHECK/CHANGE/REINSERTION/UPSIZE  3/28/2024    MYRINGOTOMY      with ventilation tube insertion    NM LITHOLAPAXY SMPL/SM <2.5 CM N/A 2019    Procedure: CYSTOSCOPY, holmium laser litholapaxy of bladder stones, EXCHANGE OF SP TUBE;  Surgeon: Javy Jeffries MD;  Location: [facility];  Service: Urology    SUPRAPUBIC CATHETER INSERTION       Social History     Tobacco Use    Smoking status: Former     Current packs/day: 0.00     Average packs/day: 0.5 packs/day for 31.0 years (15.5 ttl pk-yrs)     Types: Cigarettes     Start date:      Quit date:      Years since quittin.7    Smokeless tobacco: Never   Vaping Use    Vaping status: Never Used   Substance and Sexual Activity    Alcohol use: Not Currently    Drug use: No    Sexual activity: Not Currently     E-Cigarette/Vaping    E-Cigarette Use Never User      E-Cigarette/Vaping Substances    Nicotine No     THC No     CBD No     Flavoring No     Other No     Unknown No      Family history non-contributory  Social History:  Marital Status: Single   Patient Pre-hospital Living Situation: Home, With other family member: sister and brother  Patient Pre-hospital Level of Mobility: non-ambulatory/bed bound  Patient Pre-hospital Diet Restrictions: diabetic    Objective :  Temp:  [98.2 °F (36.8 °C)] 98.2 °F (36.8 °C)  HR:  [] 92  BP: (111-173)/(55-91) 111/59  Resp:  [13-20] 16  SpO2:  [89 %-97 %] 96 %  O2 Device: None (Room air)  Nasal Cannula O2 Flow Rate (L/min):  [2 L/min] 2 L/min    Physical Exam  Vitals and nursing note reviewed.   Constitutional:       General: He is not in acute distress.     Appearance: He is well-developed.   HENT:      Head: Normocephalic and atraumatic.      Nose: Nose normal. No congestion.      Mouth/Throat:      Mouth: Mucous membranes are dry.      Pharynx: Oropharynx is clear.   Eyes:      Conjunctiva/sclera: Conjunctivae normal.   Cardiovascular:      Rate and Rhythm: Normal rate and regular rhythm.       Heart sounds: Normal heart sounds. No murmur heard.     No friction rub. No gallop.   Pulmonary:      Effort: Pulmonary effort is normal. No respiratory distress.      Breath sounds: Normal breath sounds. No wheezing, rhonchi or rales.   Abdominal:      General: Bowel sounds are normal. There is no distension.      Palpations: Abdomen is soft.      Tenderness: There is abdominal tenderness (diffuse).   Musculoskeletal:      Cervical back: Neck supple.      Right lower leg: No edema.      Left lower leg: No edema.   Skin:     General: Skin is warm and dry.      Capillary Refill: Capillary refill takes less than 2 seconds.   Neurological:      General: No focal deficit present.      Mental Status: He is alert and oriented to person, place, and time. Mental status is at baseline.   Psychiatric:         Mood and Affect: Mood normal.         Behavior: Behavior normal.         Lines/Drains:  Lines/Drains/Airways       Active Status       Name Placement date Placement time Site Days    Suprapubic Catheter 24 Fr. 06/15/24  1153  --  112                          Lab Results: I have reviewed the following results:  Results from last 7 days   Lab Units 10/05/24  2040   WBC Thousand/uL 20.15*   HEMOGLOBIN g/dL 16.5   HEMATOCRIT % 51.2*   PLATELETS Thousands/uL 386   SEGS PCT % 73   LYMPHO PCT % 14   MONO PCT % 8   EOS PCT % 3     Results from last 7 days   Lab Units 10/05/24  2040   SODIUM mmol/L 136   POTASSIUM mmol/L 4.1   CHLORIDE mmol/L 101   CO2 mmol/L 22   BUN mg/dL 18   CREATININE mg/dL 0.95   ANION GAP mmol/L 13   CALCIUM mg/dL 10.4*   ALBUMIN g/dL 4.3   TOTAL BILIRUBIN mg/dL 0.43   ALK PHOS U/L 91   ALT U/L 11   AST U/L 15   GLUCOSE RANDOM mg/dL 153*             Lab Results   Component Value Date    HGBA1C 8.0 (H) 06/16/2024    HGBA1C 6.4 (H) 03/26/2024    HGBA1C 8.1 (H) 12/04/2023     Results from last 7 days   Lab Units 10/05/24  2340 10/05/24  2125 10/05/24  2040   LACTIC ACID mmol/L 1.3 2.3*  --     PROCALCITONIN ng/ml  --   --  0.07       Imaging Results Review: I reviewed radiology reports from this admission including: CT abdomen/pelvis.  Other Study Results Review: EKG was reviewed.     Administrative Statements   I have spent a total time of 65 minutes in caring for this patient on the day of the visit/encounter including Diagnostic results, Impressions, Counseling / Coordination of care, Documenting in the medical record, Reviewing / ordering tests, medicine, procedures  , Obtaining or reviewing history  , and Communicating with other healthcare professionals .    ** Please Note: This note has been constructed using a voice recognition system. **

## 2024-10-06 NOTE — ASSESSMENT & PLAN NOTE
"Pt with PMHx of multiple sclerosis with neurogenic bladder and chronic SPT tube, diastolic CHF, CVA, diabetic neuropathy, hypertension, diabetes, and GERSON presented to the ED secondary to abdominal pain  Pt suprapubic cath found to be blocked with sediment. This was resolved in the ED with flushing, and patient abdominal pain subsequently improved  Pt also fulfilling sepsis criteria with leukocytosis of 20.15 and tachycardia  Lactic 2.3->1.3  Procalc 0.07, repeat in AM  Septic source: UTI in the setting of suprapubic cath  CT a/p demonstrating \"Findings above highly suspicious for cystitis in the setting of indwelling suprapubic catheter.\"  UA with evidence of infection   Urine culture pending   Last urine culture from 9/7 growing enterobacter cloacae susceptible to Levaquin, resistant to cephalosporins    Pt initially received cefepime in the ED which was adjusted to Levaquin following culture review  Will continue with Levaquin pending new urine culture results  Pt given 500mL bolus in the ED, will continue with IV fluids  "

## 2024-10-06 NOTE — PLAN OF CARE
Problem: PAIN - ADULT  Goal: Verbalizes/displays adequate comfort level or baseline comfort level  Description: Interventions:  - Encourage patient to monitor pain and request assistance  - Assess pain using appropriate pain scale  - Administer analgesics based on type and severity of pain and evaluate response  - Implement non-pharmacological measures as appropriate and evaluate response  - Consider cultural and social influences on pain and pain management  - Notify physician/advanced practitioner if interventions unsuccessful or patient reports new pain  Outcome: Progressing     Problem: INFECTION - ADULT  Goal: Absence or prevention of progression during hospitalization  Description: INTERVENTIONS:  - Assess and monitor for signs and symptoms of infection  - Monitor lab/diagnostic results  - Monitor all insertion sites, i.e. indwelling lines, tubes, and drains  - Monitor endotracheal if appropriate and nasal secretions for changes in amount and color  - [place] appropriate cooling/warming therapies per order  - Administer medications as ordered  - Instruct and encourage patient and family to use good hand hygiene technique  - Identify and instruct in appropriate isolation precautions for identified infection/condition  Outcome: Progressing  Goal: Absence of fever/infection during neutropenic period  Description: INTERVENTIONS:  - Monitor WBC    Outcome: Progressing     Problem: SAFETY ADULT  Goal: Patient will remain free of falls  Description: INTERVENTIONS:  - Educate patient/family on patient safety including physical limitations  - Instruct patient to call for assistance with activity   - Consult OT/PT to assist with strengthening/mobility   - Keep Call bell within reach  - Keep bed low and locked with side rails adjusted as appropriate  - Keep care items and personal belongings within reach  - Initiate and maintain comfort rounds  - Make Fall Risk Sign visible to staff  - Apply yellow socks and bracelet  for high fall risk patients  - Consider moving patient to room near nurses station  Outcome: Progressing  Goal: Maintain or return to baseline ADL function  Description: INTERVENTIONS:  -  Assess patient's ability to carry out ADLs; assess patient's baseline for ADL function and identify physical deficits which impact ability to perform ADLs (bathing, care of mouth/teeth, toileting, grooming, dressing, etc.)  - Assess/evaluate cause of self-care deficits   - Assess range of motion  - Assess patient's mobility; develop plan if impaired  - Assess patient's need for assistive devices and provide as appropriate  - Encourage maximum independence but intervene and supervise when necessary  - Involve family in performance of ADLs  - Assess for home care needs following discharge   - Consider OT consult to assist with ADL evaluation and planning for discharge  - Provide patient education as appropriate  Outcome: Progressing  Goal: Maintains/Returns to pre admission functional level  Description: INTERVENTIONS:  - Perform AM-PAC 6 Click Basic Mobility/ Daily Activity assessment daily.  - Set and communicate daily mobility goal to care team and patient/family/caregiver.   - Collaborate with rehabilitation services on mobility goals if consulted  - Out of bed for toileting  - Record patient progress and toleration of activity level   Outcome: Progressing     Problem: DISCHARGE PLANNING  Goal: Discharge to home or other facility with appropriate resources  Description: INTERVENTIONS:  - Identify barriers to discharge w/patient and caregiver  - Arrange for needed discharge resources and transportation as appropriate  - Identify discharge learning needs (meds, wound care, etc.)  - Arrange for interpretive services to assist at discharge as needed  - Refer to Case Management Department for coordinating discharge planning if the patient needs post-hospital services based on physician/advanced practitioner order or complex needs  related to functional status, cognitive ability, or social support system  Outcome: Progressing     Problem: Knowledge Deficit  Goal: Patient/family/caregiver demonstrates understanding of disease process, treatment plan, medications, and discharge instructions  Description: Complete learning assessment and assess knowledge base.  Interventions:  - Provide teaching at level of understanding  - Provide teaching via preferred learning methods  Outcome: Progressing     Problem: Nutrition/Hydration-ADULT  Goal: Nutrient/Hydration intake appropriate for improving, restoring or maintaining nutritional needs  Description: Monitor and assess patient's nutrition/hydration status for malnutrition. Collaborate with interdisciplinary team and initiate plan and interventions as ordered.  Monitor patient's weight and dietary intake as ordered or per policy. Utilize nutrition screening tool and intervene as necessary. Determine patient's food preferences and provide high-protein, high-caloric foods as appropriate.     INTERVENTIONS:  - Monitor oral intake, urinary output, labs, and treatment plans  - Assess nutrition and hydration status and recommend course of action  - Evaluate amount of meals eaten  - Assist patient with eating if necessary   - Allow adequate time for meals  - Recommend/ encourage appropriate diets, oral nutritional supplements, and vitamin/mineral supplements  - Order, calculate, and assess calorie counts as needed  - Recommend, monitor, and adjust tube feedings and TPN/PPN based on assessed needs  - Assess need for intravenous fluids  - Provide specific nutrition/hydration education as appropriate  - Include patient/family/caregiver in decisions related to nutrition  Outcome: Progressing     Problem: Prexisting or High Potential for Compromised Skin Integrity  Goal: Skin integrity is maintained or improved  Description: INTERVENTIONS:  - Identify patients at risk for skin breakdown  - Assess and monitor skin  integrity  - Assess and monitor nutrition and hydration status  - Monitor labs   - Assess for incontinence   - Turn and reposition patient  - Assist with mobility/ambulation  - Relieve pressure over bony prominences  - Avoid friction and shearing  - Provide appropriate hygiene as needed including keeping skin clean and dry  - Evaluate need for skin moisturizer/barrier cream  - Collaborate with interdisciplinary team   - Patient/family teaching  - Consider wound care consult   Reactivated

## 2024-10-06 NOTE — ASSESSMENT & PLAN NOTE
"Lab Results   Component Value Date    HGBA1C 8.0 (H) 06/16/2024       No results for input(s): \"POCGLU\" in the last 72 hours.    Blood Sugar Average: Last 72 hrs:  Hold metformin  SSI with accucheks    "

## 2024-10-06 NOTE — ED PROVIDER NOTES
Final diagnoses:   Abdominal pain   Urinary retention   Urinary tract infection     ED Disposition       ED Disposition   Admit    Condition   Stable    Date/Time   Sun Oct 6, 2024 12:43 AM    Comment   Case was discussed with LISSETTE and the patient's admission status was agreed to be Admission Status: inpatient status to the service of Dr. Perea .               Assessment & Plan       Medical Decision Making  1. Abdominal pain - Patient states this has been worsening throughout the day, will check CBC for leukocytosis, metabolic panel for electrolyte abnormalities and dehydration,  LFT's to assess GB dysfunction, lipase for pancreatitis, CT abdomen and pelvis to assess possible intra-abdominal infection, will order urine studies as patient has an indwelling suprapubic catheter with change in infection, lactic acid, procalcitonin, blood cultures. Will treat with IV fluids, analgesia.     Problems Addressed:  Abdominal pain: acute illness or injury  Urinary retention: acute illness or injury  Urinary tract infection: acute illness or injury    Amount and/or Complexity of Data Reviewed  Labs: ordered. Decision-making details documented in ED Course.  Radiology: ordered.  ECG/medicine tests: ordered and independent interpretation performed. Decision-making details documented in ED Course.    Risk  Prescription drug management.  Decision regarding hospitalization.        ED Course as of 10/06/24 0119   Sat Oct 05, 2024   2101 WBC(!): 20.15   2125 Patient with some improvement in discomfort with hydromorphone, no urine from SBC since bad was exchanged, 25cc's of NS instilled into tube and then shelly back with cloudy urine, urine now draining into bag.   2144 SPC continues to put out turbid urine, patient states he is feeling improved.   Sun Oct 06, 2024   0119 ECG evaluated by myself, interpretation included in procedure section of note.        Medications   levofloxacin (LEVAQUIN) IVPB (premix in dextrose) 750 mg 150 mL (750  mg Intravenous New Bag 10/6/24 0010)   sodium chloride 0.9 % bolus 500 mL (0 mL Intravenous Stopped 10/5/24 2143)   HYDROmorphone (DILAUDID) injection 0.5 mg (0.5 mg Intravenous Given 10/5/24 2048)   ondansetron (ZOFRAN) injection 4 mg (4 mg Intravenous Given 10/5/24 2044)   cefepime (MAXIPIME) 2 g/50 mL dextrose IVPB (0 mg Intravenous Stopped 10/5/24 2200)   iohexol (OMNIPAQUE) 350 MG/ML injection (MULTI-DOSE) 100 mL (100 mL Intravenous Given 10/5/24 2251)       ED Risk Strat Scores                                               History of Present Illness       Chief Complaint   Patient presents with    Abdominal Pain     Pt arrives from home via ems for abdominal pain and nausea. Pt denies cp, sob, fever.        Past Medical History:   Diagnosis Date    Acute laryngitis     Acute nonsuppurative otitis media, unspecified laterality     Arm weakness     Arthritis     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HCC)     Bladder infection     Bronchitis     Constipation     Cough     Diabetes (HCC)     Diabetes mellitus (HCC)     Dizziness     Dysfunction of eustachian tube     Erectile dysfunction of non-organic origin     Fatigue     Glaucoma     Hiatal hernia     Hypertension     Imbalance     Leg muscle spasm     Leukocytosis 04/01/2024    MS (multiple sclerosis) (HCC)     Multiple sclerosis (HCC)     Nephrolithiasis     Neurogenic bladder     No natural teeth     Sinus pain     Spinal stenosis     Strain of thoracic region     Stroke ([facility])     Suprapubic catheter ([facility])       Past Surgical History:   Procedure Laterality Date    APPENDECTOMY      BRAIN SURGERY      Coil placed in aneurysm    CEREBRAL ANEURYSM REPAIR      CYSTOSCOPY      CYSTOSCOPY      CYSTOSCOPY  06/11/2018    CYSTOSCOPY  01/15/2021    EYE SURGERY      transscleral cyclophotocoagulation noncontact YAG laser    IR SUPRAPUBIC CATHETER CHECK/CHANGE/REINSERTION/UPSIZE  3/28/2024    MYRINGOTOMY      with ventilation tube insertion    CA LITHOLAPAXY SMPL/SM <2.5 CM N/A  2019    Procedure: CYSTOSCOPY, holmium laser litholapaxy of bladder stones, EXCHANGE OF SP TUBE;  Surgeon: Javy Jeffries MD;  Location: [facility];  Service: Urology    SUPRAPUBIC CATHETER INSERTION        Family History   Problem Relation Age of Onset    Heart attack Mother     Stroke Mother     Heart attack Father     Anuerysm Father         In Stomach     Aneurysm Father       Social History     Tobacco Use    Smoking status: Former     Current packs/day: 0.00     Average packs/day: 0.5 packs/day for 31.0 years (15.5 ttl pk-yrs)     Types: Cigarettes     Start date:      Quit date:      Years since quittin.7    Smokeless tobacco: Never   Vaping Use    Vaping status: Never Used   Substance Use Topics    Alcohol use: Not Currently    Drug use: No      E-Cigarette/Vaping    E-Cigarette Use Never User       E-Cigarette/Vaping Substances    Nicotine No     THC No     CBD No     Flavoring No     Other No     Unknown No       I have reviewed and agree with the history as documented.     73 YO male with history of MS presents with abdominal pain that has been worsening throughout the day. Patient states he has had similar pain in the past but this is more severe. He is unsure what the cause of his previous similar pain was. States this is constant, primarily over the central abdomen and radiating down into the pelvis. He denies known exacerbating or alleviating factors. Patient has an indwelling suprapubic catheter, he states he thinks this has been draining throughout the day. Patient states no specific changes in his bowel habits though he does have a history of constipation. Pt denies CP/SOB/F/C/N/V/D/C, no dysuria, burning on urination or blood in urine.       History provided by:  Patient   used: No        Review of Systems   Constitutional:  Negative for fever.   HENT:  Negative for dental problem.    Eyes:  Negative for visual disturbance.   Respiratory:  Negative for  shortness of breath.    Cardiovascular:  Negative for chest pain.   Gastrointestinal:  Positive for abdominal pain. Negative for nausea and vomiting.   Genitourinary:  Negative for dysuria and frequency.   Musculoskeletal:  Negative for neck pain and neck stiffness.   Skin:  Negative for rash.   Neurological:  Negative for dizziness, weakness and light-headedness.   Psychiatric/Behavioral:  Negative for agitation, behavioral problems and confusion.    All other systems reviewed and are negative.          Objective       ED Triage Vitals   Temperature Pulse Blood Pressure Respirations SpO2 Patient Position - Orthostatic VS   10/05/24 2015 10/05/24 2015 10/05/24 2015 10/05/24 2015 10/05/24 2015 --   98.2 °F (36.8 °C) (!) 108 (!) 173/91 20 96 %       Temp Source Heart Rate Source BP Location FiO2 (%) Pain Score    10/05/24 2015 10/05/24 2015 -- -- 10/05/24 2048    Oral Monitor   10 - Worst Possible Pain      Vitals      Date and Time Temp Pulse SpO2 Resp BP Pain Score FACES Pain Rating User   10/06/24 0000 -- 97 95 % 13 117/55 -- -- CG   10/05/24 2330 -- 98 97 % 18 133/61 -- -- CG   10/05/24 2300 -- 98 96 % 18 151/69 -- -- CG   10/05/24 2245 -- 99 95 % 18 133/65 -- --    10/05/24 2130 -- 107 93 % 18 124/60 -- --    10/05/24 2115 -- -- 93 % -- -- -- --    10/05/24 2100 -- 109 89 % 18 124/57 -- -- CG   10/05/24 2048 -- -- -- -- -- 10 - Worst Possible Pain --    10/05/24 2030 -- 110 94 % 20 167/84 -- --    10/05/24 2015 98.2 °F (36.8 °C) 108 96 % 20 173/91 -- --             Physical Exam  Vitals and nursing note reviewed.   Constitutional:       Appearance: He is well-developed.      Comments: Uncomfortable.   HENT:      Head: Normocephalic and atraumatic.   Eyes:      Extraocular Movements: Extraocular movements intact.   Cardiovascular:      Rate and Rhythm: Tachycardia present.      Pulses: Normal pulses.      Heart sounds: Normal heart sounds.   Pulmonary:      Effort: Pulmonary effort is normal.       Breath sounds: Normal breath sounds.   Abdominal:      General: There is no distension.      Tenderness: There is abdominal tenderness. There is no guarding.   Musculoskeletal:         General: Normal range of motion.      Cervical back: Normal range of motion.   Skin:     Findings: No rash.   Neurological:      Mental Status: He is alert and oriented to person, place, and time.   Psychiatric:         Behavior: Behavior normal.         Results Reviewed       Procedure Component Value Units Date/Time    Lactic acid 2 Hours [[id number]]  (Normal) Collected: 10/05/24 2340    Lab Status: Final result Specimen: Blood from Arm, Right Updated: 10/05/24 2358     LACTIC ACID 1.3 mmol/L     Narrative:      Result may be elevated if tourniquet was used during collection.    HS Troponin I 2hr [[id number]]  (Normal) Collected: 10/05/24 2242    Lab Status: Final result Specimen: Blood from Arm, Right Updated: 10/05/24 2308     hs TnI 2hr 4 ng/L      Delta 2hr hsTnI -1 ng/L     Lactic acid, plasma (w/reflex if result > 2.0) [[id number]]  (Abnormal) Collected: 10/05/24 2125    Lab Status: Final result Specimen: Blood from Arm, Right Updated: 10/05/24 2144     LACTIC ACID 2.3 mmol/L     Narrative:      Result may be elevated if tourniquet was used during collection.    Urine Microscopic [[id number]]  (Abnormal) Collected: 10/05/24 2126    Lab Status: Final result Specimen: Urine, Suprapubic catheter Updated: 10/05/24 2141     RBC, UA 30-50 /hpf      WBC, UA Innumerable /hpf      Epithelial Cells None Seen /hpf      Bacteria, UA Occasional /hpf      Budding Yeast Present    UA (URINE) with reflex to Scope [[id number]]  (Abnormal) Collected: 10/05/24 2126    Lab Status: Final result Specimen: Urine, Suprapubic catheter Updated: 10/05/24 2141     Color, UA Yellow     Clarity, UA Turbid     Specific Gravity, UA 1.015     pH, UA 6.0     Leukocytes, UA Large     Nitrite, UA Negative     Protein,  (2+) mg/dl      Glucose, UA >=1000 (1%)  mg/dl      Ketones, UA Negative mg/dl      Urobilinogen, UA <2.0 mg/dl      Bilirubin, UA Negative     Occult Blood, UA Moderate    Urine culture [[id number]] Collected: 10/05/24 2127    Lab Status: In process Specimen: Urine, Suprapubic catheter Updated: 10/05/24 2129    TSH, 3rd generation with Free T4 reflex [[id number]]  (Normal) Collected: 10/05/24 2040    Lab Status: Final result Specimen: Blood from Arm, Left Updated: 10/05/24 2129     TSH 3RD GENERATON 1.160 uIU/mL     Procalcitonin [[id number]]  (Normal) Collected: 10/05/24 2040    Lab Status: Final result Specimen: Blood from Arm, Left Updated: 10/05/24 2122     Procalcitonin 0.07 ng/ml     HS Troponin 0hr (reflex protocol) [[id number]]  (Normal) Collected: 10/05/24 2040    Lab Status: Final result Specimen: Blood from Arm, Left Updated: 10/05/24 2119     hs TnI 0hr 5 ng/L     Basic metabolic panel [[id number]]  (Abnormal) Collected: 10/05/24 2040    Lab Status: Final result Specimen: Blood from Arm, Left Updated: 10/05/24 2118     Sodium 136 mmol/L      Potassium 4.1 mmol/L      Chloride 101 mmol/L      CO2 22 mmol/L      ANION GAP 13 mmol/L      BUN 18 mg/dL      Creatinine 0.95 mg/dL      Glucose 153 mg/dL      Calcium 10.4 mg/dL      eGFR 77 ml/min/1.73sq m     Narrative:      [employer] guidelines for Chronic Kidney Disease (CKD):     Stage 1 with normal or high GFR (GFR > 90 mL/min/1.73 square meters)    Stage 2 Mild CKD (GFR = 60-89 mL/min/1.73 square meters)    Stage 3A Moderate CKD (GFR = 45-59 mL/min/1.73 square meters)    Stage 3B Moderate CKD (GFR = 30-44 mL/min/1.73 square meters)    Stage 4 Severe CKD (GFR = 15-29 mL/min/1.73 square meters)    Stage 5 End Stage CKD (GFR <15 mL/min/1.73 square meters)  Note: GFR calculation is accurate only with a steady state creatinine    Hepatic function panel [[id number]]  (Normal) Collected: 10/05/24 2040    Lab Status: Final result Specimen: Blood from Arm, Left Updated: 10/05/24 2118      Total Bilirubin 0.43 mg/dL      Bilirubin, Direct 0.14 mg/dL      Alkaline Phosphatase 91 U/L      AST 15 U/L      ALT 11 U/L      Total Protein 8.3 g/dL      Albumin 4.3 g/dL     Lipase [[id number]]  (Normal) Collected: 10/05/24 2040    Lab Status: Final result Specimen: Blood from Arm, Left Updated: 10/05/24 2118     Lipase 24 u/L     Magnesium [[id number]]  (Abnormal) Collected: 10/05/24 2040    Lab Status: Final result Specimen: Blood from Arm, Left Updated: 10/05/24 2118     Magnesium 1.5 mg/dL     Blood culture #2 [[id number]] Collected: 10/05/24 2112    Lab Status: In process Specimen: Blood from Arm, Right Updated: 10/05/24 2115    Blood culture #1 [[id number]] Collected: 10/05/24 2040    Lab Status: In process Specimen: Blood from Arm, Left Updated: 10/05/24 2115    CBC and differential [[id number]]  (Abnormal) Collected: 10/05/24 2040    Lab Status: Final result Specimen: Blood from Arm, Left Updated: 10/05/24 2055     WBC 20.15 Thousand/uL      RBC 6.08 Million/uL      Hemoglobin 16.5 g/dL      Hematocrit 51.2 %      MCV 84 fL      MCH 27.1 pg      MCHC 32.2 g/dL      RDW 15.9 %      MPV 8.6 fL      Platelets 386 Thousands/uL      nRBC 0 /100 WBCs      Segmented % 73 %      Immature Grans % 1 %      Lymphocytes % 14 %      Monocytes % 8 %      Eosinophils Relative 3 %      Basophils Relative 1 %      Absolute Neutrophils 15.14 Thousands/µL      Absolute Immature Grans 0.10 Thousand/uL      Absolute Lymphocytes 2.72 Thousands/µL      Absolute Monocytes 1.51 Thousand/µL      Eosinophils Absolute 0.57 Thousand/µL      Basophils Absolute 0.11 Thousands/µL             CT abdomen pelvis with contrast   Final Interpretation by Lucas Guerrier MD (10/06 0027)      Findings above highly suspicious for cystitis in the setting of indwelling suprapubic catheter.      Large volume of stool within the rectal vault suspicious for fecal impaction and suspected stercoral colitis scratch that.      Overall these findings are  somewhat similar in appearance to prior recent examinations. Clinical correlation recommended.         Workstation performed: [id number]             ECG 12 Lead Documentation Only    Date/Time: 10/6/2024 1:18 AM    Performed by: Mario Sosa MD  Authorized by: Mario Sosa MD    ECG reviewed by me, the ED Provider: yes    Patient location:  ED  Previous ECG:     Previous ECG:  Compared to current    Comparison ECG info:  8/31/2024  Interpretation:     Interpretation: abnormal    Rate:     ECG rate:  109    ECG rate assessment: tachycardic    Rhythm:     Rhythm: sinus rhythm    QRS:     QRS axis:  Normal    QRS intervals:  Normal  Conduction:     Conduction: normal    ST segments:     ST segments:  Normal  T waves:     T waves: normal        ED Medication and Procedure Management   Prior to Admission Medications   Prescriptions Last Dose Informant Patient Reported? Taking?   Empagliflozin (JARDIANCE) 10 MG TABS tablet  Outside Facility (Specify) Yes No   Sig: Take 10 mg by mouth every morning   Ergocalciferol (VITAMIN D2 PO)  Outside Facility (Specify) Yes No   Sig: Take 50,000 Units by mouth once a week   acetaminophen (TYLENOL) 325 mg tablet  Outside Facility (Specify) No No   Sig: Take 2 tablets (650 mg total) by mouth every 6 (six) hours as needed for mild pain   albuterol (2.5 mg/3 mL) 0.083 % nebulizer solution  Outside Facility (Specify) No No   Sig: Take 3 mL (2.5 mg total) by nebulization every 6 (six) hours as needed for wheezing or shortness of breath   amLODIPine-atorvastatin (CADUET) 10-80 MG per tablet  Outside Facility (Specify) Yes No   Sig: Take 1 tablet by mouth daily   baclofen 10 mg tablet  Outside Facility (Specify) Yes No   Sig: Take 5 mg by mouth 3 (three) times a day   bisacodyl (DULCOLAX) 10 mg suppository  Outside Facility (Specify) Yes No   Sig: Insert 10 mg into the rectum daily as needed for constipation   budesonide-formoterol (SYMBICORT) 160-4.5 mcg/act inhaler  Outside  Facility (Specify) Yes No   Sig: Inhale 2 puffs 2 (two) times a day Rinse mouth after use.   clopidogrel (PLAVIX) 75 mg tablet  Outside Facility (Specify) No No   Sig: Take 1 tablet (75 mg total) by mouth daily   cromolyn (NASALCHROM) 5.2 MG/ACT nasal spray   No No   Si spray into each nostril 3 (three) times a day   cyanocobalamin (VITAMIN B-12) 500 MCG tablet  Outside Facility (Specify) No No   Sig: Take 1 tablet (500 mcg total) by mouth daily   gabapentin (NEURONTIN) 300 mg capsule  Outside Facility (Specify) No No   Sig: Take 1 capsule (300 mg total) by mouth 2 (two) times a day   gabapentin (NEURONTIN) 300 mg capsule  Outside Facility (Specify) No No   Sig: Take 2 capsules (600 mg total) by mouth daily at bedtime   latanoprost (XALATAN) 0.005 % ophthalmic solution  Outside Facility (Specify) Yes No   Sig: Administer 1 drop to both eyes daily at bedtime   lidocaine (LIDODERM) 5 %  Outside Facility (Specify) No No   Sig: Apply 1 patch topically over 12 hours daily Remove & Discard patch within 12 hours or as directed by MD charlesne (ANTIVERT) 12.5 MG tablet   No No   Sig: Take 1 tablet (12.5 mg total) by mouth every 8 (eight) hours as needed for dizziness   melatonin 3 mg  Outside Facility (Specify) Yes No   Sig: Take 3 mg by mouth daily at bedtime as needed   metFORMIN (GLUCOPHAGE) 1000 MG tablet  Outside Facility (Specify) Yes No   Sig: Take 1,000 mg by mouth 2 (two) times a day with meals   methenamine hippurate (HIPREX) 1 g tablet  Outside Facility (Specify) No No   Sig: Take 1 tablet (1 g total) by mouth 2 (two) times a day with meals   mirtazapine (REMERON) 7.5 MG tablet  Outside Facility (Specify) Yes No   Sig: Take 7.5 mg by mouth daily at bedtime   pantoprazole (PROTONIX) 40 mg tablet  Outside Facility (Specify) Yes No   Sig: Take 40 mg by mouth daily   polyethylene glycol (MIRALAX) 17 g packet   No No   Sig: Take 17 g by mouth 2 (two) times a day   propranolol (INDERAL LA) 60 mg 24 hr capsule   Outside Facility (Specify) Yes No   Sig: Take 60 mg by mouth daily   senna-docusate sodium (SENOKOT S) 8.6-50 mg per tablet  Outside Facility (Specify) Yes No   Sig: Take 2 tablets by mouth daily at bedtime      Facility-Administered Medications: None     Patient's Medications   Discharge Prescriptions    No medications on file     No discharge procedures on file.  ED SEPSIS DOCUMENTATION   Time reflects when diagnosis was documented in both MDM as applicable and the Disposition within this note       Time User Action Codes Description Comment    10/6/2024 12:43 AM Mario Sosa [R10.9] Abdominal pain     10/6/2024 12:43 AM Mario Sosa [R33.9] Urinary retention     10/6/2024 12:43 AM Mario Sosa [N39.0] Urinary tract infection                  Mario Sosa MD  10/06/24 0119

## 2024-10-06 NOTE — ASSESSMENT & PLAN NOTE
"CT a/p demonstrating \"Fecal retention with large amount of stool within the rectal vault. There is associated wall thickening of the rectum and adjacent perirectal fatty stranding somewhat similar in appearance to the prior\"  Continue bowel regimen   "

## 2024-10-06 NOTE — ASSESSMENT & PLAN NOTE
S/p suprapubic cath for neurogenic bladder  Pt is primarily bedbound at baseline  Continue baclofen  Continue outpatient neurologic follow-up

## 2024-10-06 NOTE — ED NOTES
Suprapubic cath flushed by provider. 1200ML urine returned.  Urine samples sent.      Mary Willis RN  10/05/24 6731

## 2024-10-06 NOTE — PLAN OF CARE
Problem: PAIN - ADULT  Goal: Verbalizes/displays adequate comfort level or baseline comfort level  Description: Interventions:  - Encourage patient to monitor pain and request assistance  - Assess pain using appropriate pain scale  - Administer analgesics based on type and severity of pain and evaluate response  - Implement non-pharmacological measures as appropriate and evaluate response  - Consider cultural and social influences on pain and pain management  - Notify physician/advanced practitioner if interventions unsuccessful or patient reports new pain  Outcome: Progressing     Problem: INFECTION - ADULT  Goal: Absence or prevention of progression during hospitalization  Description: INTERVENTIONS:  - Assess and monitor for signs and symptoms of infection  - Monitor lab/diagnostic results  - Monitor all insertion sites, i.e. indwelling lines, tubes, and drains  - Monitor endotracheal if appropriate and nasal secretions for changes in amount and color  - [place] appropriate cooling/warming therapies per order  - Administer medications as ordered  - Instruct and encourage patient and family to use good hand hygiene technique  - Identify and instruct in appropriate isolation precautions for identified infection/condition  Outcome: Progressing  Goal: Absence of fever/infection during neutropenic period  Description: INTERVENTIONS:  - Monitor WBC    Outcome: Progressing     Problem: SAFETY ADULT  Goal: Patient will remain free of falls  Description: INTERVENTIONS:  - Educate patient/family on patient safety including physical limitations  - Instruct patient to call for assistance with activity   - Consult OT/PT to assist with strengthening/mobility   - Keep Call bell within reach  - Keep bed low and locked with side rails adjusted as appropriate  - Keep care items and personal belongings within reach  - Initiate and maintain comfort rounds  - Make Fall Risk Sign visible to staff  - Apply yellow socks and bracelet  for high fall risk patients  - Consider moving patient to room near nurses station  Outcome: Progressing  Goal: Maintain or return to baseline ADL function  Description: INTERVENTIONS:  -  Assess patient's ability to carry out ADLs; assess patient's baseline for ADL function and identify physical deficits which impact ability to perform ADLs (bathing, care of mouth/teeth, toileting, grooming, dressing, etc.)  - Assess/evaluate cause of self-care deficits   - Assess range of motion  - Assess patient's mobility; develop plan if impaired  - Assess patient's need for assistive devices and provide as appropriate  - Encourage maximum independence but intervene and supervise when necessary  - Involve family in performance of ADLs  - Assess for home care needs following discharge   - Consider OT consult to assist with ADL evaluation and planning for discharge  - Provide patient education as appropriate  Outcome: Progressing  Goal: Maintains/Returns to pre admission functional level  Description: INTERVENTIONS:  - Perform AM-PAC 6 Click Basic Mobility/ Daily Activity assessment daily.  - Set and communicate daily mobility goal to care team and patient/family/caregiver.   - Collaborate with rehabilitation services on mobility goals if consulted  - - Out of bed for toileting  - Record patient progress and toleration of activity level   Outcome: Progressing     Problem: DISCHARGE PLANNING  Goal: Discharge to home or other facility with appropriate resources  Description: INTERVENTIONS:  - Identify barriers to discharge w/patient and caregiver  - Arrange for needed discharge resources and transportation as appropriate  - Identify discharge learning needs (meds, wound care, etc.)  - Arrange for interpretive services to assist at discharge as needed  - Refer to Case Management Department for coordinating discharge planning if the patient needs post-hospital services based on physician/advanced practitioner order or complex needs  related to functional status, cognitive ability, or social support system  Outcome: Progressing     Problem: Knowledge Deficit  Goal: Patient/family/caregiver demonstrates understanding of disease process, treatment plan, medications, and discharge instructions  Description: Complete learning assessment and assess knowledge base.  Interventions:  - Provide teaching at level of understanding  - Provide teaching via preferred learning methods  Outcome: Progressing     Problem: Nutrition/Hydration-ADULT  Goal: Nutrient/Hydration intake appropriate for improving, restoring or maintaining nutritional needs  Description: Monitor and assess patient's nutrition/hydration status for malnutrition. Collaborate with interdisciplinary team and initiate plan and interventions as ordered.  Monitor patient's weight and dietary intake as ordered or per policy. Utilize nutrition screening tool and intervene as necessary. Determine patient's food preferences and provide high-protein, high-caloric foods as appropriate.     INTERVENTIONS:  - Monitor oral intake, urinary output, labs, and treatment plans  - Assess nutrition and hydration status and recommend course of action  - Evaluate amount of meals eaten  - Assist patient with eating if necessary   - Allow adequate time for meals  - Recommend/ encourage appropriate diets, oral nutritional supplements, and vitamin/mineral supplements  - Order, calculate, and assess calorie counts as needed  - Recommend, monitor, and adjust tube feedings and TPN/PPN based on assessed needs  - Assess need for intravenous fluids  - Provide specific nutrition/hydration education as appropriate  - Include patient/family/caregiver in decisions related to nutrition  Outcome: Progressing

## 2024-10-07 LAB
GLUCOSE SERPL-MCNC: 150 MG/DL (ref 65–140)
GLUCOSE SERPL-MCNC: 166 MG/DL (ref 65–140)
GLUCOSE SERPL-MCNC: 174 MG/DL (ref 65–140)
GLUCOSE SERPL-MCNC: 83 MG/DL (ref 65–140)
PROCALCITONIN SERPL-MCNC: 0.07 NG/ML

## 2024-10-07 PROCEDURE — 84145 PROCALCITONIN (PCT): CPT

## 2024-10-07 PROCEDURE — 99232 SBSQ HOSP IP/OBS MODERATE 35: CPT | Performed by: STUDENT IN AN ORGANIZED HEALTH CARE EDUCATION/TRAINING PROGRAM

## 2024-10-07 PROCEDURE — 99223 1ST HOSP IP/OBS HIGH 75: CPT | Performed by: UROLOGY

## 2024-10-07 PROCEDURE — 0T9B30Z DRAINAGE OF BLADDER WITH DRAINAGE DEVICE, PERCUTANEOUS APPROACH: ICD-10-PCS | Performed by: UROLOGY

## 2024-10-07 PROCEDURE — 51705 CHANGE OF BLADDER TUBE: CPT | Performed by: PHYSICIAN ASSISTANT

## 2024-10-07 PROCEDURE — NC001 PR NO CHARGE: Performed by: PHYSICIAN ASSISTANT

## 2024-10-07 PROCEDURE — 82948 REAGENT STRIP/BLOOD GLUCOSE: CPT

## 2024-10-07 RX ORDER — LEVOFLOXACIN 750 MG/1
750 TABLET, FILM COATED ORAL EVERY 24 HOURS
Status: COMPLETED | OUTPATIENT
Start: 2024-10-08 | End: 2024-10-08

## 2024-10-07 RX ADMIN — ACETAMINOPHEN 975 MG: 325 TABLET, FILM COATED ORAL at 14:20

## 2024-10-07 RX ADMIN — BUDESONIDE AND FORMOTEROL FUMARATE DIHYDRATE 2 PUFF: 160; 4.5 AEROSOL RESPIRATORY (INHALATION) at 09:28

## 2024-10-07 RX ADMIN — GABAPENTIN 300 MG: 300 CAPSULE ORAL at 14:20

## 2024-10-07 RX ADMIN — LEVOFLOXACIN 750 MG: 5 INJECTION, SOLUTION INTRAVENOUS at 00:59

## 2024-10-07 RX ADMIN — BACLOFEN 5 MG: 10 TABLET ORAL at 17:03

## 2024-10-07 RX ADMIN — POLYETHYLENE GLYCOL 3350 17 G: 17 POWDER, FOR SOLUTION ORAL at 09:27

## 2024-10-07 RX ADMIN — PROPRANOLOL HYDROCHLORIDE 60 MG: 60 CAPSULE, EXTENDED RELEASE ORAL at 09:30

## 2024-10-07 RX ADMIN — ACETAMINOPHEN 975 MG: 325 TABLET, FILM COATED ORAL at 05:40

## 2024-10-07 RX ADMIN — GABAPENTIN 600 MG: 300 CAPSULE ORAL at 21:58

## 2024-10-07 RX ADMIN — ACETAMINOPHEN 975 MG: 325 TABLET, FILM COATED ORAL at 21:58

## 2024-10-07 RX ADMIN — ENOXAPARIN SODIUM 40 MG: 40 INJECTION SUBCUTANEOUS at 09:28

## 2024-10-07 RX ADMIN — BACLOFEN 5 MG: 10 TABLET ORAL at 19:40

## 2024-10-07 RX ADMIN — INSULIN LISPRO 1 UNITS: 100 INJECTION, SOLUTION INTRAVENOUS; SUBCUTANEOUS at 11:46

## 2024-10-07 RX ADMIN — LIDOCAINE 1 PATCH: 700 PATCH TOPICAL at 09:28

## 2024-10-07 RX ADMIN — METHENAMINE HIPPURATE 1 G: 1000 TABLET ORAL at 08:26

## 2024-10-07 RX ADMIN — CLOPIDOGREL BISULFATE 75 MG: 75 TABLET ORAL at 09:30

## 2024-10-07 RX ADMIN — ATORVASTATIN CALCIUM 80 MG: 80 TABLET, FILM COATED ORAL at 09:30

## 2024-10-07 RX ADMIN — BISACODYL 10 MG: 10 SUPPOSITORY RECTAL at 11:46

## 2024-10-07 RX ADMIN — MIRTAZAPINE 7.5 MG: 7.5 TABLET, FILM COATED ORAL at 21:59

## 2024-10-07 RX ADMIN — OXYCODONE HYDROCHLORIDE 10 MG: 10 TABLET ORAL at 19:38

## 2024-10-07 RX ADMIN — AMLODIPINE BESYLATE 10 MG: 10 TABLET ORAL at 09:30

## 2024-10-07 RX ADMIN — BACLOFEN 5 MG: 10 TABLET ORAL at 09:30

## 2024-10-07 RX ADMIN — METHENAMINE HIPPURATE 1 G: 1000 TABLET ORAL at 17:02

## 2024-10-07 RX ADMIN — PANTOPRAZOLE SODIUM 40 MG: 40 TABLET, DELAYED RELEASE ORAL at 05:40

## 2024-10-07 RX ADMIN — BUDESONIDE AND FORMOTEROL FUMARATE DIHYDRATE 2 PUFF: 160; 4.5 AEROSOL RESPIRATORY (INHALATION) at 18:04

## 2024-10-07 RX ADMIN — GABAPENTIN 300 MG: 300 CAPSULE ORAL at 09:30

## 2024-10-07 RX ADMIN — INSULIN LISPRO 1 UNITS: 100 INJECTION, SOLUTION INTRAVENOUS; SUBCUTANEOUS at 17:02

## 2024-10-07 RX ADMIN — INSULIN LISPRO 1 UNITS: 100 INJECTION, SOLUTION INTRAVENOUS; SUBCUTANEOUS at 23:43

## 2024-10-07 NOTE — ASSESSMENT & PLAN NOTE
Lab Results   Component Value Date    HGBA1C 9.0 (H) 10/06/2024       Recent Labs     10/06/24  1532 10/06/24  2220 10/07/24  0734 10/07/24  1136   POCGLU 124 167* 83 150*       Blood Sugar Average: Last 72 hrs:  (P) [id number] metformin  Continue sliding scale

## 2024-10-07 NOTE — PLAN OF CARE
Problem: PAIN - ADULT  Goal: Verbalizes/displays adequate comfort level or baseline comfort level  Description: Interventions:  - Encourage patient to monitor pain and request assistance  - Assess pain using appropriate pain scale  - Administer analgesics based on type and severity of pain and evaluate response  - Implement non-pharmacological measures as appropriate and evaluate response  - Consider cultural and social influences on pain and pain management  - Notify physician/advanced practitioner if interventions unsuccessful or patient reports new pain  Outcome: Progressing     Problem: INFECTION - ADULT  Goal: Absence or prevention of progression during hospitalization  Description: INTERVENTIONS:  - Assess and monitor for signs and symptoms of infection  - Monitor lab/diagnostic results  - Monitor all insertion sites, i.e. indwelling lines, tubes, and drains  - Monitor endotracheal if appropriate and nasal secretions for changes in amount and color  - [place] appropriate cooling/warming therapies per order  - Administer medications as ordered  - Instruct and encourage patient and family to use good hand hygiene technique  - Identify and instruct in appropriate isolation precautions for identified infection/condition  Outcome: Progressing  Goal: Absence of fever/infection during neutropenic period  Description: INTERVENTIONS:  - Monitor WBC    Outcome: Progressing     Problem: SAFETY ADULT  Goal: Patient will remain free of falls  Description: INTERVENTIONS:  - Educate patient/family on patient safety including physical limitations  - Instruct patient to call for assistance with activity   - Consult OT/PT to assist with strengthening/mobility   - Keep Call bell within reach  - Keep bed low and locked with side rails adjusted as appropriate  - Keep care items and personal belongings within reach  - Initiate and maintain comfort rounds  - Make Fall Risk Sign visible to staff  - Offer Toileting every  Hours,  in advance of need  - Initiate/Maintain alarm  - Obtain necessary fall risk management equipment:   - Apply yellow socks and bracelet for high fall risk patients  - Consider moving patient to room near nurses station  Outcome: Progressing  Goal: Maintain or return to baseline ADL function  Description: INTERVENTIONS:  -  Assess patient's ability to carry out ADLs; assess patient's baseline for ADL function and identify physical deficits which impact ability to perform ADLs (bathing, care of mouth/teeth, toileting, grooming, dressing, etc.)  - Assess/evaluate cause of self-care deficits   - Assess range of motion  - Assess patient's mobility; develop plan if impaired  - Assess patient's need for assistive devices and provide as appropriate  - Encourage maximum independence but intervene and supervise when necessary  - Involve family in performance of ADLs  - Assess for home care needs following discharge   - Consider OT consult to assist with ADL evaluation and planning for discharge  - Provide patient education as appropriate  Outcome: Progressing  Goal: Maintains/Returns to pre admission functional level  Description: INTERVENTIONS:  - Perform AM-PAC 6 Click Basic Mobility/ Daily Activity assessment daily.  - Set and communicate daily mobility goal to care team and patient/family/caregiver.   - Collaborate with rehabilitation services on mobility goals if consulted  - Perform Range of Motion  times a day.  - Reposition patient every  hours.  - Dangle patient  times a day  - Stand patient  times a day  - Ambulate patient  times a day  - Out of bed to chair  times a day   - Out of bed for meals times a day  - Out of bed for toileting  - Record patient progress and toleration of activity level   Outcome: Progressing     Problem: DISCHARGE PLANNING  Goal: Discharge to home or other facility with appropriate resources  Description: INTERVENTIONS:  - Identify barriers to discharge w/patient and caregiver  - Arrange for  needed discharge resources and transportation as appropriate  - Identify discharge learning needs (meds, wound care, etc.)  - Arrange for interpretive services to assist at discharge as needed  - Refer to Case Management Department for coordinating discharge planning if the patient needs post-hospital services based on physician/advanced practitioner order or complex needs related to functional status, cognitive ability, or social support system  Outcome: Progressing     Problem: Knowledge Deficit  Goal: Patient/family/caregiver demonstrates understanding of disease process, treatment plan, medications, and discharge instructions  Description: Complete learning assessment and assess knowledge base.  Interventions:  - Provide teaching at level of understanding  - Provide teaching via preferred learning methods  Outcome: Progressing     Problem: Nutrition/Hydration-ADULT  Goal: Nutrient/Hydration intake appropriate for improving, restoring or maintaining nutritional needs  Description: Monitor and assess patient's nutrition/hydration status for malnutrition. Collaborate with interdisciplinary team and initiate plan and interventions as ordered.  Monitor patient's weight and dietary intake as ordered or per policy. Utilize nutrition screening tool and intervene as necessary. Determine patient's food preferences and provide high-protein, high-caloric foods as appropriate.     INTERVENTIONS:  - Monitor oral intake, urinary output, labs, and treatment plans  - Assess nutrition and hydration status and recommend course of action  - Evaluate amount of meals eaten  - Assist patient with eating if necessary   - Allow adequate time for meals  - Recommend/ encourage appropriate diets, oral nutritional supplements, and vitamin/mineral supplements  - Order, calculate, and assess calorie counts as needed  - Recommend, monitor, and adjust tube feedings and TPN/PPN based on assessed needs  - Assess need for intravenous fluids  -  Provide specific nutrition/hydration education as appropriate  - Include patient/family/caregiver in decisions related to nutrition  Outcome: Progressing     Problem: Prexisting or High Potential for Compromised Skin Integrity  Goal: Skin integrity is maintained or improved  Description: INTERVENTIONS:  - Identify patients at risk for skin breakdown  - Assess and monitor skin integrity  - Assess and monitor nutrition and hydration status  - Monitor labs   - Assess for incontinence   - Turn and reposition patient  - Assist with mobility/ambulation  - Relieve pressure over bony prominences  - Avoid friction and shearing  - Provide appropriate hygiene as needed including keeping skin clean and dry  - Evaluate need for skin moisturizer/barrier cream  - Collaborate with interdisciplinary team   - Patient/family teaching  - Consider wound care consult   Outcome: Progressing

## 2024-10-07 NOTE — ASSESSMENT & PLAN NOTE
Likely UTI- presented with abdominal pain, SPT was at least transiently occluded. Now draining. Due for routine exchange as well.  Leukocytosis improving, lactic acidosis resolved, procalcitonin low  Levaquin for presumed UTI- final c&s pending; BCx are no growth 24 hrs

## 2024-10-07 NOTE — PROCEDURES
Universal Protocol:  procedure performed by consultantConsent: Verbal consent obtained.  Risks and benefits: risks, benefits and alternatives were discussed  Consent given by: patient  Required items: required blood products, implants, devices, and special equipment available  Patient identity confirmed: verbally with patient    Bladder catheterization    Date/Time: 10/7/2024 12:22 PM    Performed by: Brianna Ramirez PA-C  Authorized by: Brianna Ramirez PA-C    Patient location:  Bedside  Consent:     Consent given by:  Patient  Universal protocol:     Required blood products, implants, devices and special equipment available: yes      Patient identity confirmed:  Verbally with patient  Pre-procedure details:     Preparation: Patient was prepped and draped in usual sterile fashion    Anesthesia (see MAR for exact dosages):     Anesthesia method:  None  Procedure details:     Bladder irrigation: no      Catheter insertion:  Indwelling    Approach: percutaneous      Approach comment:  Suprapubic tube    Catheter type:  Latex    Catheter size: 24 Fr.    Number of attempts:  1    Successful placement: yes      Urine characteristics:  Clear    Provider performed due to:  Nurse unable to complete  Post-procedure details:     Patient tolerance of procedure:  Tolerated well, no immediate complications  Comments:      removed existing 20Fr catheter after deflation of intact balloon, site prepped and draped in usual sterile fashion. 24Fr catheter placed without difficulty, drained cloudy yellow urine with mucus/sediment. 10cc sterile water in balloon. pt tolerated well

## 2024-10-07 NOTE — ASSESSMENT & PLAN NOTE
S/p suprapubic cath for neurogenic bladder. Bed bound at baseline.  Continue baclofen  Continue outpatient neurologic follow-up

## 2024-10-07 NOTE — ASSESSMENT & PLAN NOTE
Wt Readings from Last 3 Encounters:   10/07/24 73.2 kg (161 lb 6 oz)   09/07/24 69.9 kg (154 lb 1.6 oz)   08/31/24 71.3 kg (157 lb 3 oz)     Pt appears euvolemic on exam  Last ECHO 10/18 EF 75% with grade 1 DD  Monitor daily weights, I/Os

## 2024-10-07 NOTE — UTILIZATION REVIEW
Initial Clinical Review    ED TREATMENT  TIME    10/5  @  2044    Admission: Date/Time/Statement:   Admission Orders (From admission, onward)       Ordered        10/06/24 0044  INPATIENT ADMISSION  Once                          Orders Placed This Encounter   Procedures    INPATIENT ADMISSION     Standing Status:   Standing     Number of Occurrences:   1     Order Specific Question:   Level of Care     Answer:   Med Surg [16]     Order Specific Question:   Estimated length of stay     Answer:   More than 2 Midnights     Order Specific Question:   Certification     Answer:   I certify that inpatient services are medically necessary for this patient for a duration of greater than two midnights. See H&P and MD Progress Notes for additional information about the patient's course of treatment.     ED Arrival Information       Expected   -    Arrival   10/5/2024 20:11    Acuity   Urgent              Means of arrival   Ambulance    Escorted by   [facility] ([facility])    Service   Hospitalist    Admission type   Emergency              Arrival complaint   -             Chief Complaint   Patient presents with    Abdominal Pain     Pt arrives from home via ems for abdominal pain and nausea. Pt denies cp, sob, fever.        Initial Presentation: 74 y.o. male presents to ED  via  EMS  from home with nausea and diffuse abdominal pain.   Had some relief  in ED when S/P catheter unblocked.  Met SIRS  criteria in ED, found with UTI.      PMH  is   MS,  Dm2,  GERSON  on  CPAP,  CVA, primary hypertension,  CHF.  Ct abdomen shows cystitis.  U/a  with  infection.  Admit  Ip with  Sepsis due to UTI, constipation and plan is   S/P  500 cc IVF  in ED and continue, monitor labs, blood/urine cultures, bowel regimen,  SHARON and continue home meds.      Date:   10/7  Day 3: Has surpassed a 2nd midnight with active treatments and services.  Urology consult  IV  levaquin d/c  today and start po  10/8.  Wait final cultures,  BC  NG  thus  far.  SPT   transiently  occluded, now draining.   Continue routine  exchange.       Continue bowel regimen.  Feels improved.   SPT  s ite  clean  and  exchange performed.  Continue current meds.       Anticipated Length of Stay/Certification Statement: Patient will be admitted on an inpatient basis with an anticipated length of stay of greater than 2 midnights secondary to sepsis secondary to UTI.       ED Treatment-Medication Administration from 10/05/2024 2011 to 10/06/2024 0158         Date/Time Order Dose Route Action     10/05/2024 2043 sodium chloride 0.9 % bolus 500 mL 500 mL Intravenous New Bag     10/05/2024 2048 HYDROmorphone (DILAUDID) injection 0.5 mg 0.5 mg Intravenous Given     10/05/2024 2044 ondansetron (ZOFRAN) injection 4 mg 4 mg Intravenous Given     10/05/2024 2130 cefepime (MAXIPIME) 2 g/50 mL dextrose IVPB 2,000 mg Intravenous New Bag     10/05/2024 2251 iohexol (OMNIPAQUE) 350 MG/ML injection (MULTI-DOSE) 100 mL 100 mL Intravenous Given     10/06/2024 0010 levofloxacin (LEVAQUIN) IVPB (premix in dextrose) 750 mg 150 mL 750 mg Intravenous New Bag            Scheduled Medications:  acetaminophen, 975 mg, Oral, Q8H CIERA  amLODIPine, 10 mg, Oral, Daily   And  atorvastatin, 80 mg, Oral, Daily  baclofen, 5 mg, Oral, TID  budesonide-formoterol, 2 puff, Inhalation, BID  clopidogrel, 75 mg, Oral, Daily  cromolyn, 1 spray, Each Nare, TID  enoxaparin, 40 mg, Subcutaneous, Daily  gabapentin, 300 mg, Oral, 2 times per day  gabapentin, 600 mg, Oral, HS  insulin lispro, 1-5 Units, Subcutaneous, TID AC  insulin lispro, 1-5 Units, Subcutaneous, HS  [START ON 10/8/2024] levofloxacin, 750 mg, Oral, Q24H  IV  levaquin  Q 24 hrs - d/c   10/7  lidocaine, 1 patch, Topical, Daily  methenamine hippurate, 1 g, Oral, BID With Meals  mirtazapine, 7.5 mg, Oral, HS  pantoprazole, 40 mg, Oral, Early Morning  polyethylene glycol, 17 g, Oral, BID  propranolol, 60 mg, Oral, Daily  senna-docusate sodium, 2 tablet, Oral,  HS      Continuous IV Infusions:     PRN Meds:  albuterol, 2.5 mg, Nebulization, Q6H PRN  bisacodyl, 10 mg, Rectal, Daily PRN  meclizine, 12.5 mg, Oral, Q8H PRN  melatonin, 3 mg, Oral, HS PRN  morphine injection, 2 mg, Intravenous, Q6H PRN  oxyCODONE, 10 mg, Oral, Q6H PRN  oxyCODONE, 5 mg, Oral, Q6H PRN      ED Triage Vitals   Temperature Pulse Respirations Blood Pressure SpO2 Pain Score   10/05/24 2015 10/05/24 2015 10/05/24 2015 10/05/24 2015 10/05/24 2015 10/05/24 2048   98.2 °F (36.8 °C) (!) 108 20 (!) 173/91 96 % 10 - Worst Possible Pain     Weight (last 2 days)       Date/Time Weight    10/07/24 0544 73.2 (161.38)    10/06/24 0533 68.5 (151.02)    10/06/24 0249 68.5 (151.02)            Vital Signs (last 3 days)       Date/Time Temp Pulse Resp BP MAP (mmHg) SpO2 Calculated FIO2 (%) - Nasal Cannula Nasal Cannula O2 Flow Rate (L/min) O2 Device Patient Position - Orthostatic VS Annamarie Coma Scale Score Pain    10/07/24 0900 -- -- -- -- -- -- -- -- -- -- 15 No Pain    10/07/24 07:35:28 98.1 °F (36.7 °C) 76 16 125/59 81 95 % -- -- None (Room air) Lying -- --    10/07/24 0540 -- -- -- -- -- -- -- -- -- -- -- 5    10/06/24 2230 -- -- -- -- -- -- -- -- None (Room air) -- 15 5    10/06/24 14:46:20 98 °F (36.7 °C) 91 17 138/56 83 94 % -- -- None (Room air) Lying -- --    10/06/24 1446 -- -- -- -- -- -- -- -- -- -- -- 5    10/06/24 1134 -- -- -- -- -- -- -- -- -- -- -- 10 - Worst Possible Pain    10/06/24 11:09:05 -- 97 -- 151/69 96 95 % -- -- -- -- -- --    10/06/24 1038 -- -- -- -- -- -- -- -- -- -- 15 7    10/06/24 07:15:17 97.6 °F (36.4 °C) 81 18 114/56 75 93 % -- -- None (Room air) Lying -- --    10/06/24 0213 -- -- -- -- -- -- -- -- None (Room air) -- 15 6    10/06/24 0124 -- 92 16 111/59 -- 96 % -- -- -- -- -- 3    10/06/24 0000 -- 97 13 117/55 79 95 % -- -- -- -- -- --    10/05/24 2330 -- 98 18 133/61 88 97 % -- -- -- -- -- --    10/05/24 2300 -- 98 18 151/69 99 96 % 28 2 L/min Nasal cannula -- -- --    10/05/24  2245 -- 99 18 133/65 91 95 % -- -- None (Room air) -- -- --    10/05/24 2130 -- 107 18 124/60 86 93 % 28 2 L/min Nasal cannula -- -- --    10/05/24 2115 -- -- -- -- -- 93 % 28 2 L/min Nasal cannula -- -- --    10/05/24 2100 -- 109 18 124/57 84 89 % -- -- None (Room air) -- -- --    10/05/24 2048 -- -- -- -- -- -- -- -- -- -- -- 10 - Worst Possible Pain    10/05/24 2030 -- 110 20 167/84 118 94 % -- -- None (Room air) -- -- --    10/05/24 2019 -- -- -- -- -- -- -- -- -- -- 15 --    10/05/24 2015 98.2 °F (36.8 °C) 108 20 173/91 -- 96 % -- -- None (Room air) -- -- --              Pertinent Labs/Diagnostic Test Results:   Radiology:  CT abdomen pelvis with contrast   Final Interpretation by Lucas Guerrier MD (10/06 0027)      Findings above highly suspicious for cystitis in the setting of indwelling suprapubic catheter.      Large volume of stool within the rectal vault suspicious for fecal impaction and suspected stercoral colitis scratch that.      Overall these findings are somewhat similar in appearance to prior recent examinations. Clinical correlation recommended.         Workstation performed: [id number]           Cardiology:  ECG 12 lead   Final Result by Ed Basurto DO (10/06 1307)   Normal sinus rhythm   Normal ECG   When compared with ECG of 05-OCT-2024 20:19, (unconfirmed)   No significant change was found   Confirmed by Ed Basurto ([id number]) on 10/6/2024 1:06:59 PM      ECG 12 lead   Final Result by Ed Basurto DO (10/06 1307)   Sinus tachycardia   RSR' or QR pattern in V1 suggests right ventricular conduction delay   Possible Lateral infarct , age undetermined   Abnormal ECG   When compared with ECG of 31-AUG-2024 07:31,   No significant change was found   Confirmed by Ed Basurto ([id number]) on 10/6/2024 1:06:55 PM            Results from last 7 days   Lab Units 10/06/24  0534 10/05/24  2040   WBC Thousand/uL 14.52* 20.15*   HEMOGLOBIN g/dL 13.8 16.5   HEMATOCRIT % 42.5 51.2*   PLATELETS Thousands/uL 321 386    TOTAL NEUT ABS Thousands/µL 10.42* 15.14*         Results from last 7 days   Lab Units 10/06/24  0534 10/05/24  2040   SODIUM mmol/L 135 136   POTASSIUM mmol/L 4.0 4.1   CHLORIDE mmol/L 103 101   CO2 mmol/L 22 22   ANION GAP mmol/L 10 13   BUN mg/dL 15 18   CREATININE mg/dL 0.83 0.95   EGFR ml/min/1.73sq m 86 77   CALCIUM mg/dL 9.3 10.4*   MAGNESIUM mg/dL  --  1.5*     Results from last 7 days   Lab Units 10/05/24  2040   AST U/L 15   ALT U/L 11   ALK PHOS U/L 91   TOTAL PROTEIN g/dL 8.3   ALBUMIN g/dL 4.3   TOTAL BILIRUBIN mg/dL 0.43   BILIRUBIN DIRECT mg/dL 0.14     Results from last 7 days   Lab Units 10/07/24  1136 10/07/24  0734 10/06/24  2220 10/06/24  1532 10/06/24  1035 10/06/24  0715   POC GLUCOSE mg/dl 150* 83 167* 124 133 115     Results from last 7 days   Lab Units 10/06/24  0534 10/05/24  2040   GLUCOSE RANDOM mg/dL 113 153*         Results from last 7 days   Lab Units 10/06/24  0534   HEMOGLOBIN A1C % 9.0*   EAG mg/dl 212       Results from last 7 days   Lab Units 10/05/24  2242 10/05/24  2040   HS TNI 0HR ng/L  --  5   HS TNI 2HR ng/L 4  --    HSTNI D2 ng/L -1  --              Results from last 7 days   Lab Units 10/05/24  2040   TSH 3RD GENERATON uIU/mL 1.160     Results from last 7 days   Lab Units 10/07/24  0516 10/05/24  2040   PROCALCITONIN ng/ml 0.07 0.07     Results from last 7 days   Lab Units 10/05/24  2340 10/05/24  2125   LACTIC ACID mmol/L 1.3 2.3*           Results from last 7 days   Lab Units 10/05/24  2040   LIPASE u/L 24                 Results from last 7 days   Lab Units 10/05/24  2126   CLARITY UA  Turbid   COLOR UA  Yellow   SPEC GRAV UA  1.015   PH UA  6.0   GLUCOSE UA mg/dl >=1000 (1%)*   KETONES UA mg/dl Negative   BLOOD UA  Moderate*   PROTEIN UA mg/dl 100 (2+)*   NITRITE UA  Negative   BILIRUBIN UA  Negative   UROBILINOGEN UA (BE) mg/dl <2.0   LEUKOCYTES UA  Large*   WBC UA /hpf Innumerable*   RBC UA /hpf 30-50*   BACTERIA UA /hpf Occasional   EPITHELIAL CELLS WET PREP /hpf  None Seen                                 Results from last 7 days   Lab Units 10/05/24  2127 10/05/24  2112 10/05/24  2040   BLOOD CULTURE   --  No Growth at 24 hrs. No Growth at 24 hrs.   URINE CULTURE  Culture too young- will reincubate  --   --                    Present on Admission:   Sepsis due to urinary tract infection  (HCC)   Constipation   Chronic diastolic congestive heart failure (HCC)   GERSON on CPAP   Type 2 diabetes mellitus without complication, without long-term current use of insulin (HCC)   Multiple sclerosis (HCC)   Hyperlipidemia   Primary hypertension   Neurogenic bladder      Admitting Diagnosis: Urinary retention [R33.9]  Urinary tract infection [N39.0]  Abdominal pain [R10.9]  Age/Sex: 74 y.o. male    Network Utilization Review Department  ATTENTION: Please call with any questions or concerns to [phone] and carefully listen to the prompts so that you are directed to the right person. All voicemails are confidential.   For Discharge needs, contact Care Management DC Support Team at [phone] opt. 2  Send all requests for admission clinical reviews, approved or denied determinations and any other requests to dedicated fax number below belonging to the [facility] where the patient is receiving treatment. List of dedicated fax numbers for the Facilities:  FACILITY NAME UR FAX NUMBER   ADMISSION DENIALS (Administrative/Medical Necessity) [phone]   DISCHARGE SUPPORT TEAM (NETWORK) [phone]   PARENT CHILD HEALTH (Maternity/NICU/Pediatrics) [phone]   [facility] [phone]   [facility] [phone]   [facility] [phone]   [facility] [phone]   [facility] [phone]   [facility] [phone]   [facility] [phone]   [facility] [phone]   [facility]  [facility] [phone]   [facility] [phone]   [facility] [phone]   [facility] [phone]

## 2024-10-07 NOTE — PROGRESS NOTES
"Progress Note - Hospitalist   Name: Mathew Rico 74 y.o. male I MRN: [id number]  Unit/Bed#: E5 -01 I Date of Admission: 10/5/2024   Date of Service: 10/7/2024 I Hospital Day: 1    Assessment & Plan  Sepsis due to urinary tract infection  (HCC)  Pt with PMHx of multiple sclerosis with neurogenic bladder and chronic SPT tube, diastolic CHF, CVA, diabetic neuropathy, hypertension, diabetes, and GERSON presented to the ED secondary to abdominal pain  Pt suprapubic cath found to be blocked with sediment. This was resolved in the ED with flushing, and patient abdominal pain subsequently improved  Met sepsis criteria possibly secondary to UTI in the setting of a blocked SPT catheter. S/p SP tube exchange 10/7/2024.   Continue Levaquin based on previous susceptibilities. Await cultures  Constipation  CT a/p demonstrating \"Fecal retention with large amount of stool within the rectal vault. There is associated wall thickening of the rectum and adjacent perirectal fatty stranding somewhat similar in appearance to the prior\"  Continue bowel regimen  BM 10/7/2024  Hyperlipidemia  Continue statin  Primary hypertension  Controlled  Continue amlodipine  On propanolol given history of essential tremor  History of CVA (cerebrovascular accident)  History of left hemisphere lacunar infarct in 2018  Continue Plavix and statin therapy  Chronic diastolic congestive heart failure (HCC)  Wt Readings from Last 3 Encounters:   10/07/24 73.2 kg (161 lb 6 oz)   09/07/24 69.9 kg (154 lb 1.6 oz)   08/31/24 71.3 kg (157 lb 3 oz)     Pt appears euvolemic on exam  Last ECHO 10/18 EF 75% with grade 1 DD  Monitor daily weights, I/Os    GERSON on CPAP  Continue cpap h.s.  Type 2 diabetes mellitus without complication, without long-term current use of insulin (HCC)  Lab Results   Component Value Date    HGBA1C 9.0 (H) 10/06/2024       Recent Labs     10/06/24  1532 10/06/24  2220 10/07/24  0734 10/07/24  1136   POCGLU 124 167* 83 150*       Blood " Sugar Average: Last 72 hrs:  (P) [id number] metformin  Continue sliding scale    Multiple sclerosis (HCC)  S/p suprapubic cath for neurogenic bladder. Bed bound at baseline.  Continue baclofen  Continue outpatient neurologic follow-up   Neurogenic bladder      VTE Pharmacologic Prophylaxis: VTE Score: 7 Moderate Risk (Score 3-4) - Pharmacological DVT Prophylaxis Ordered: enoxaparin (Lovenox).    Mobility:   Basic Mobility Inpatient Raw Score: 6  -[facility] Goal: 2: Bed activities/Dependent transfer  -HL Achieved: 2: Bed activities/Dependent transfer    Discussions with Specialists or Other Care Team Provider: urology regarding treatment plan    Education and Discussions with Family / Patient: patient, called Brother Guzman    Current Length of Stay: 1 day(s)  Current Patient Status: Inpatient   Certification Statement: The patient will continue to require additional inpatient hospital stay due to iv antibiotics, await cultures  Discharge Plan: 24-48 hours    Code Status: Level 1 - Full Code    Subjective   Patient seen and examined. Besides abdominal discomfort / spasms he has no new complaints overnight.     Objective   Vitals:   Temp (24hrs), Av.1 °F (36.7 °C), Min:98.1 °F (36.7 °C), Max:98.1 °F (36.7 °C)    Temp:  [98.1 °F (36.7 °C)] 98.1 °F (36.7 °C)  HR:  [76] 76  Resp:  [16] 16  BP: (125)/(59) 125/59  SpO2:  [95 %] 95 %  Body mass index is 26.85 kg/m².     Input and Output Summary (last 24 hours):     Intake/Output Summary (Last 24 hours) at 10/7/2024 1622  Last data filed at 10/7/2024 0851  Gross per 24 hour   Intake 300 ml   Output 1450 ml   Net -1150 ml       Physical Exam  Vitals reviewed.   Constitutional:       General: He is not in acute distress.  HENT:      Head: Normocephalic.      Nose: Nose normal.      Mouth/Throat:      Mouth: Mucous membranes are moist.   Eyes:      General: No scleral icterus.  Cardiovascular:      Rate and Rhythm: Normal rate.   Pulmonary:      Effort:  Pulmonary effort is normal. No respiratory distress.   Abdominal:      General: There is no distension.      Palpations: Abdomen is soft.      Tenderness: There is no abdominal tenderness.   Skin:     General: Skin is warm.   Neurological:      Mental Status: He is alert.   Psychiatric:         Mood and Affect: Mood normal.         Behavior: Behavior normal.       Lines/Drains:  Lines/Drains/Airways       Active Status       Name Placement date Placement time Site Days    Suprapubic Catheter 24 Fr. 10/07/24  1340  --  less than 1                            Lab Results: I have reviewed the following results:   Results from last 7 days   Lab Units 10/06/24  0534 10/05/24  2040   WBC Thousand/uL 14.52* 20.15*   HEMOGLOBIN g/dL 13.8 16.5   PLATELETS Thousands/uL 321 386   MCV fL 83 84     Results from last 7 days   Lab Units 10/06/24  0534 10/05/24  2040   SODIUM mmol/L 135 136   POTASSIUM mmol/L 4.0 4.1   CHLORIDE mmol/L 103 101   CO2 mmol/L 22 22   ANION GAP mmol/L 10 13   BUN mg/dL 15 18   CREATININE mg/dL 0.83 0.95   CALCIUM mg/dL 9.3 10.4*   ALBUMIN g/dL  --  4.3   TOTAL BILIRUBIN mg/dL  --  0.43   ALK PHOS U/L  --  91   ALT U/L  --  11   AST U/L  --  15   EGFR ml/min/1.73sq m 86 77   GLUCOSE RANDOM mg/dL 113 153*     Results from last 7 days   Lab Units 10/05/24  2040   MAGNESIUM mg/dL 1.5*         Results from last 7 days   Lab Units 10/05/24  2242 10/05/24  2040   HS TNI 0HR ng/L  --  5   HS TNI 2HR ng/L 4  --           Results from last 7 days   Lab Units 10/07/24  0516 10/05/24  2340 10/05/24  2125   LACTIC ACID mmol/L  --  1.3 2.3*   PROCALCITONIN ng/ml 0.07  --   --      Results from last 7 days   Lab Units 10/07/24  1136 10/07/24  0734 10/06/24  2220 10/06/24  1532 10/06/24  1035 10/06/24  0715   POC GLUCOSE mg/dl 150* 83 167* 124 133 115     Results from last 7 days   Lab Units 10/06/24  0534   HEMOGLOBIN A1C % 9.0*     Results from last 7 days   Lab Units 10/05/24  2040   TSH 3RD GENERATON uIU/mL 1.160        Recent Cultures (last 7 days):   Results from last 7 days   Lab Units 10/05/24  2127 10/05/24  2112 10/05/24  2040   BLOOD CULTURE   --  No Growth at 24 hrs. No Growth at 24 hrs.   URINE CULTURE  Culture too young- will reincubate  --   --        Imaging:  Reviewed radiology reports from this admission: ct a/p    Last 24 Hours Medication List:     Current Facility-Administered Medications:     acetaminophen (TYLENOL) tablet 975 mg, Q8H CIERA    albuterol inhalation solution 2.5 mg, Q6H PRN    amLODIPine (NORVASC) tablet 10 mg, Daily **AND** atorvastatin (LIPITOR) tablet 80 mg, Daily    baclofen tablet 5 mg, TID    bisacodyl (DULCOLAX) rectal suppository 10 mg, Daily PRN    budesonide-formoterol (SYMBICORT) 160-4.5 mcg/act inhaler 2 puff, BID    clopidogrel (PLAVIX) tablet 75 mg, Daily    cromolyn (NASALCHROM) nasal spray 1 spray, TID    enoxaparin (LOVENOX) subcutaneous injection 40 mg, Daily    gabapentin (NEURONTIN) capsule 300 mg, 2 times per day    gabapentin (NEURONTIN) capsule 600 mg, HS    insulin lispro (HumALOG/ADMELOG) 100 units/mL subcutaneous injection 1-5 Units, TID AC **AND** Fingerstick Glucose (POCT), TID AC    insulin lispro (HumALOG/ADMELOG) 100 units/mL subcutaneous injection 1-5 Units, HS    [START ON 10/8/2024] levofloxacin (LEVAQUIN) tablet 750 mg, Q24H    lidocaine (LIDODERM) 5 % patch 1 patch, Daily    meclizine (ANTIVERT) tablet 12.5 mg, Q8H PRN    melatonin tablet 3 mg, HS PRN    methenamine hippurate (HIPREX) tablet 1 g, BID With Meals    mirtazapine (REMERON) tablet 7.5 mg, HS    morphine injection 2 mg, Q6H PRN    oxyCODONE (ROXICODONE) immediate release tablet 10 mg, Q6H PRN    oxyCODONE (ROXICODONE) IR tablet 5 mg, Q6H PRN    pantoprazole (PROTONIX) EC tablet 40 mg, Early Morning    polyethylene glycol (MIRALAX) packet 17 g, BID    propranolol (INDERAL LA) 24 hr capsule 60 mg, Daily    senna-docusate sodium (SENOKOT S) 8.6-50 mg per tablet 2 tablet, HS      **Please Note: This note  may have been constructed using a voice recognition system.**

## 2024-10-07 NOTE — ASSESSMENT & PLAN NOTE
"CT a/p demonstrating \"Fecal retention with large amount of stool within the rectal vault. There is associated wall thickening of the rectum and adjacent perirectal fatty stranding somewhat similar in appearance to the prior\"  Continue bowel regimen  BM 10/7/2024  "

## 2024-10-07 NOTE — UTILIZATION REVIEW
Notification of Unplanned, Urgent, or   Emergency Inpatient Admission   AUTHORIZATION REQUEST   [place] [address]  Tax ID: [id number]  NPI: [id number]  [facility] Service: [facility]  Admission Level of Care: Inpatient  Place of Service Code: 21     Attending Physician Information  Attending Name and NPI#: Sanjeev Concepcion Md [[id number]]  Phone: [phone]     Admission Information  Inpatient Admission Date/Time: 10/6/24 12:44 AM  Discharge Date/Time: No discharge date for patient encounter.  Admitting Diagnosis Code/Description:  Urinary retention [R33.9]  Urinary tract infection [N39.0]  Abdominal pain [R10.9]     Utilization Review Contact  Marsha Garcia, Utilization   Phone: [phone]  Fax: [phone]  Email: [email or web address]  Contact for approvals/pending authorizations, clinical reviews, and discharge.     Physician Advisory Services Contact  Medical Necessity Denial & Hlwf-pm-Egyk Discussion  Phone: [phone]  Fax: [phone]  Email: [email or web address]     DISCHARGE SUPPORT TEAM:  For Patients Discharge Needs & Updates  Phone: [phone] opt. [phone] Fax: [phone]  Email: [email or web address]

## 2024-10-07 NOTE — CONSULTS
Consultation - Urology   Name: Mathew Rico 74 y.o. male I MRN: [id number]  Unit/Bed#: E5 -01 I Date of Admission: 10/5/2024   Date of Service: 10/7/2024 I Hospital Day: 1   Inpatient consult to Urology  Consult performed by: Brianna Ramirez PA-C  Consult ordered by: Mahad Perea MD        Physician Requesting Evaluation: Sanjeev Concepcion MD   Reason for Evaluation / Principal Problem: blocked SP tube    Assessment & Plan  Sepsis due to urinary tract infection  (HCC)  Likely UTI- presented with abdominal pain, SPT was at least transiently occluded. Now draining. Due for routine exchange as well.  Leukocytosis improving, lactic acidosis resolved, procalcitonin low  Levaquin for presumed UTI- final c&s pending; BCx are no growth 24 hrs  Constipation  bowel regimen  Neurogenic bladder  neurogenic bladder w/ urinary retention s/t MS  24 Fr SPT exchange bedside today  Urology service signing off.    History of Present Illness   Mathew Rico is a 74 y.o. male who presents with 2-3 days of abdominal pain admitted through ER they flushed a blocked SP tube (due for exchange today). he is on antibiotics for UTI. He is feeling better. He is OK with me performing his SP tube exchange while here in the hospital. He is still constipated and getting medicine for that as well.    Review of Systems   Constitutional: Negative.    Respiratory: Negative.     Cardiovascular: Negative.    Gastrointestinal:  Positive for constipation. Negative for abdominal distention and abdominal pain.   Genitourinary:  Positive for difficulty urinating (SPT chronic). Negative for decreased urine volume, dysuria, flank pain, frequency, hematuria and urgency.   Musculoskeletal: Negative.      I have reviewed the patient's PMH, PSH, Social History, Family History, Meds, and Allergies    Objective :  Temp:  [98 °F (36.7 °C)-98.1 °F (36.7 °C)] 98.1 °F (36.7 °C)  HR:  [76-91] 76  BP: (125-138)/(56-59) 125/59  Resp:  [16-17] 16  SpO2:  [94 %-95  %] 95 %  O2 Device: None (Room air)    I/O         10/05 0701  10/06 0700 10/06 0701  10/07 0700 10/07 0701  10/08 0700    P.O.  180 300    Total Intake(mL/kg)  180 (2.5) 300 (4.1)    Urine (mL/kg/hr) 600 1350 (0.8) 550 (2.7)    Stool 0 0     Total Output 600 1350 550    Net -600 -1170 -250           Unmeasured Stool Occurrence 1 x 1 x           Lines/Drains/Airways       Active Status       Name Placement date Placement time Site Days    Suprapubic Catheter 24 Fr. 06/15/24  1153  --  114                  Physical Exam  Constitutional:       General: He is not in acute distress.     Appearance: Normal appearance. He is not ill-appearing or diaphoretic.   HENT:      Head: Normocephalic and atraumatic.   Cardiovascular:      Rate and Rhythm: Normal rate and regular rhythm.      Pulses: Normal pulses.      Heart sounds: Normal heart sounds.   Pulmonary:      Effort: Pulmonary effort is normal.      Breath sounds: Normal breath sounds.   Abdominal:      General: Abdomen is flat.      Palpations: Abdomen is soft.      Comments: 24Fr SPT cystostomy site clean, exchange performed. see separate note   Musculoskeletal:      Right lower leg: No edema.      Left lower leg: No edema.   Skin:     Coloration: Skin is not pale.      Findings: No rash.   Neurological:      General: No focal deficit present.      Mental Status: He is alert. Mental status is at baseline.      Gait: Gait normal.   Psychiatric:         Mood and Affect: Mood normal.           Lab Results: I have reviewed the following results:  Recent Labs     10/05/24  2040 10/06/24  0534   WBC 20.15* 14.52*   HGB 16.5 13.8   HCT 51.2* 42.5    321   SODIUM 136 135   K 4.1 4.0    103   CO2 22 22   BUN 18 15   CREATININE 0.95 0.83   GLUC 153* 113   MG 1.5*  --    AST 15  --    ALT 11  --    ALB 4.3  --    TBILI 0.43  --    ALKPHOS 91  --      Lab Results   Component Value Date    COLORU Yellow 10/05/2024    COLORU Yellow 09/02/2016    CLARITYU Turbid  10/05/2024    CLARITYU Transparent 09/02/2016    SPECGRAV 1.015 10/05/2024    SPECGRAV 1.005 09/02/2016    PHUR 6.0 10/05/2024    PHUR 5.5 09/07/2024    PHUR 6 09/02/2016    LEUKOCYTESUR Large (A) 10/05/2024    LEUKOCYTESUR ++Large 09/02/2016    NITRITE Negative 10/05/2024    NITRITE neg 09/02/2016    PROTEINUA neg 09/02/2016    GLUCOSEU >=1000 (1%) (A) 10/05/2024    GLUCOSEU Negative 08/28/2015    KETONESU Negative 10/05/2024    KETONESU neg 09/02/2016    BILIRUBINUR Negative 10/05/2024    BILIRUBINUR neg 09/02/2016    BLOODU Moderate (A) 10/05/2024    BLOODU neg 09/02/2016   ,   Lab Results   Component Value Date    URINECX Culture too young- will reincubate 10/05/2024       Imaging Results Review: I personally reviewed the following image studies/reports in PACS and discussed pertinent findings with Radiology:   and CT abdomen/pelvis. My interpretation of the radiology images/reports is: no stone disease, few small renal cysts, no hydronephrosis. sp tube in the bladder with diffuse bladder wall thickening no bladder stones, large volume retained stool in rectum.      VTE Prophylaxis: Enoxaparin (Lovenox)

## 2024-10-07 NOTE — PLAN OF CARE
Problem: PAIN - ADULT  Goal: Verbalizes/displays adequate comfort level or baseline comfort level  Description: Interventions:  - Encourage patient to monitor pain and request assistance  - Assess pain using appropriate pain scale  - Administer analgesics based on type and severity of pain and evaluate response  - Implement non-pharmacological measures as appropriate and evaluate response  - Consider cultural and social influences on pain and pain management  - Notify physician/advanced practitioner if interventions unsuccessful or patient reports new pain  Outcome: Progressing     Problem: INFECTION - ADULT  Goal: Absence or prevention of progression during hospitalization  Description: INTERVENTIONS:  - Assess and monitor for signs and symptoms of infection  - Monitor lab/diagnostic results  - Monitor all insertion sites, i.e. indwelling lines, tubes, and drains  - Monitor endotracheal if appropriate and nasal secretions for changes in amount and color  - [place] appropriate cooling/warming therapies per order  - Administer medications as ordered  - Instruct and encourage patient and family to use good hand hygiene technique  - Identify and instruct in appropriate isolation precautions for identified infection/condition  Outcome: Progressing  Goal: Absence of fever/infection during neutropenic period  Description: INTERVENTIONS:  - Monitor WBC    Outcome: Progressing     Problem: SAFETY ADULT  Goal: Patient will remain free of falls  Description: INTERVENTIONS:  - Educate patient/family on patient safety including physical limitations  - Instruct patient to call for assistance with activity   - Consult OT/PT to assist with strengthening/mobility   - Keep Call bell within reach  - Keep bed low and locked with side rails adjusted as appropriate  - Keep care items and personal belongings within reach  - Initiate and maintain comfort rounds  - Make Fall Risk Sign visible to staff  - Offer Toileting every  Hours,  in advance of need  - Initiate/Maintain alarm  - Obtain necessary fall risk management equipment:   - Apply yellow socks and bracelet for high fall risk patients  - Consider moving patient to room near nurses station  Outcome: Progressing  Goal: Maintain or return to baseline ADL function  Description: INTERVENTIONS:  -  Assess patient's ability to carry out ADLs; assess patient's baseline for ADL function and identify physical deficits which impact ability to perform ADLs (bathing, care of mouth/teeth, toileting, grooming, dressing, etc.)  - Assess/evaluate cause of self-care deficits   - Assess range of motion  - Assess patient's mobility; develop plan if impaired  - Assess patient's need for assistive devices and provide as appropriate  - Encourage maximum independence but intervene and supervise when necessary  - Involve family in performance of ADLs  - Assess for home care needs following discharge   - Consider OT consult to assist with ADL evaluation and planning for discharge  - Provide patient education as appropriate  Outcome: Progressing  Goal: Maintains/Returns to pre admission functional level  Description: INTERVENTIONS:  - Perform AM-PAC 6 Click Basic Mobility/ Daily Activity assessment daily.  - Set and communicate daily mobility goal to care team and patient/family/caregiver.   - Collaborate with rehabilitation services on mobility goals if consulted  - Perform Range of Motion  times a day.  - Reposition patient every  hours.  - Dangle patient  times a day  - Stand patient times a day  - Ambulate patient  times a day  - Out of bed to chair  times a day   - Out of bed for meals times a day  - Out of bed for toileting  - Record patient progress and toleration of activity level   Outcome: Progressing     Problem: DISCHARGE PLANNING  Goal: Discharge to home or other facility with appropriate resources  Description: INTERVENTIONS:  - Identify barriers to discharge w/patient and caregiver  - Arrange for  needed discharge resources and transportation as appropriate  - Identify discharge learning needs (meds, wound care, etc.)  - Arrange for interpretive services to assist at discharge as needed  - Refer to Case Management Department for coordinating discharge planning if the patient needs post-hospital services based on physician/advanced practitioner order or complex needs related to functional status, cognitive ability, or social support system  Outcome: Progressing     Problem: Knowledge Deficit  Goal: Patient/family/caregiver demonstrates understanding of disease process, treatment plan, medications, and discharge instructions  Description: Complete learning assessment and assess knowledge base.  Interventions:  - Provide teaching at level of understanding  - Provide teaching via preferred learning methods  Outcome: Progressing     Problem: Nutrition/Hydration-ADULT  Goal: Nutrient/Hydration intake appropriate for improving, restoring or maintaining nutritional needs  Description: Monitor and assess patient's nutrition/hydration status for malnutrition. Collaborate with interdisciplinary team and initiate plan and interventions as ordered.  Monitor patient's weight and dietary intake as ordered or per policy. Utilize nutrition screening tool and intervene as necessary. Determine patient's food preferences and provide high-protein, high-caloric foods as appropriate.     INTERVENTIONS:  - Monitor oral intake, urinary output, labs, and treatment plans  - Assess nutrition and hydration status and recommend course of action  - Evaluate amount of meals eaten  - Assist patient with eating if necessary   - Allow adequate time for meals  - Recommend/ encourage appropriate diets, oral nutritional supplements, and vitamin/mineral supplements  - Order, calculate, and assess calorie counts as needed  - Recommend, monitor, and adjust tube feedings and TPN/PPN based on assessed needs  - Assess need for intravenous fluids  -  Provide specific nutrition/hydration education as appropriate  - Include patient/family/caregiver in decisions related to nutrition  Outcome: Progressing     Problem: Prexisting or High Potential for Compromised Skin Integrity  Goal: Skin integrity is maintained or improved  Description: INTERVENTIONS:  - Identify patients at risk for skin breakdown  - Assess and monitor skin integrity  - Assess and monitor nutrition and hydration status  - Monitor labs   - Assess for incontinence   - Turn and reposition patient  - Assist with mobility/ambulation  - Relieve pressure over bony prominences  - Avoid friction and shearing  - Provide appropriate hygiene as needed including keeping skin clean and dry  - Evaluate need for skin moisturizer/barrier cream  - Collaborate with interdisciplinary team   - Patient/family teaching  - Consider wound care consult   Outcome: Progressing

## 2024-10-07 NOTE — PLAN OF CARE
Problem: PAIN - ADULT  Goal: Verbalizes/displays adequate comfort level or baseline comfort level  Description: Interventions:  - Encourage patient to monitor pain and request assistance  - Assess pain using appropriate pain scale  - Administer analgesics based on type and severity of pain and evaluate response  - Implement non-pharmacological measures as appropriate and evaluate response  - Consider cultural and social influences on pain and pain management  - Notify physician/advanced practitioner if interventions unsuccessful or patient reports new pain  Outcome: Progressing     Problem: INFECTION - ADULT  Goal: Absence or prevention of progression during hospitalization  Description: INTERVENTIONS:  - Assess and monitor for signs and symptoms of infection  - Monitor lab/diagnostic results  - Monitor all insertion sites, i.e. indwelling lines, tubes, and drains  - Monitor endotracheal if appropriate and nasal secretions for changes in amount and color  - [place] appropriate cooling/warming therapies per order  - Administer medications as ordered  - Instruct and encourage patient and family to use good hand hygiene technique  - Identify and instruct in appropriate isolation precautions for identified infection/condition  Outcome: Progressing  Goal: Absence of fever/infection during neutropenic period  Description: INTERVENTIONS:  - Monitor WBC    Outcome: Progressing     Problem: SAFETY ADULT  Goal: Patient will remain free of falls  Description: INTERVENTIONS:  - Educate patient/family on patient safety including physical limitations  - Instruct patient to call for assistance with activity   - Consult OT/PT to assist with strengthening/mobility   - Keep Call bell within reach  - Keep bed low and locked with side rails adjusted as appropriate  - Keep care items and personal belongings within reach  - Initiate and maintain comfort rounds  - Make Fall Risk Sign visible to staff  - Obtain necessary fall risk  management equipment:   - Apply yellow socks and bracelet for high fall risk patients  - Consider moving patient to room near nurses station  Outcome: Progressing  Goal: Maintain or return to baseline ADL function  Description: INTERVENTIONS:  -  Assess patient's ability to carry out ADLs; assess patient's baseline for ADL function and identify physical deficits which impact ability to perform ADLs (bathing, care of mouth/teeth, toileting, grooming, dressing, etc.)  - Assess/evaluate cause of self-care deficits   - Assess range of motion  - Assess patient's mobility; develop plan if impaired  - Assess patient's need for assistive devices and provide as appropriate  - Encourage maximum independence but intervene and supervise when necessary  - Involve family in performance of ADLs  - Assess for home care needs following discharge   - Consider OT consult to assist with ADL evaluation and planning for discharge  - Provide patient education as appropriate  Outcome: Progressing  Goal: Maintains/Returns to pre admission functional level  Description: INTERVENTIONS:  - Perform AM-PAC 6 Click Basic Mobility/ Daily Activity assessment daily.  - Set and communicate daily mobility goal to care team and patient/family/caregiver.   - Collaborate with rehabilitation services on mobility goals if consulted  - Out of bed for toileting  - Record patient progress and toleration of activity level   Outcome: Progressing     Problem: DISCHARGE PLANNING  Goal: Discharge to home or other facility with appropriate resources  Description: INTERVENTIONS:  - Identify barriers to discharge w/patient and caregiver  - Arrange for needed discharge resources and transportation as appropriate  - Identify discharge learning needs (meds, wound care, etc.)  - Arrange for interpretive services to assist at discharge as needed  - Refer to Case Management Department for coordinating discharge planning if the patient needs post-hospital services based on  physician/advanced practitioner order or complex needs related to functional status, cognitive ability, or social support system  Outcome: Progressing     Problem: Knowledge Deficit  Goal: Patient/family/caregiver demonstrates understanding of disease process, treatment plan, medications, and discharge instructions  Description: Complete learning assessment and assess knowledge base.  Interventions:  - Provide teaching at level of understanding  - Provide teaching via preferred learning methods  Outcome: Progressing     Problem: Nutrition/Hydration-ADULT  Goal: Nutrient/Hydration intake appropriate for improving, restoring or maintaining nutritional needs  Description: Monitor and assess patient's nutrition/hydration status for malnutrition. Collaborate with interdisciplinary team and initiate plan and interventions as ordered.  Monitor patient's weight and dietary intake as ordered or per policy. Utilize nutrition screening tool and intervene as necessary. Determine patient's food preferences and provide high-protein, high-caloric foods as appropriate.     INTERVENTIONS:  - Monitor oral intake, urinary output, labs, and treatment plans  - Assess nutrition and hydration status and recommend course of action  - Evaluate amount of meals eaten  - Assist patient with eating if necessary   - Allow adequate time for meals  - Recommend/ encourage appropriate diets, oral nutritional supplements, and vitamin/mineral supplements  - Order, calculate, and assess calorie counts as needed  - Recommend, monitor, and adjust tube feedings and TPN/PPN based on assessed needs  - Assess need for intravenous fluids  - Provide specific nutrition/hydration education as appropriate  - Include patient/family/caregiver in decisions related to nutrition  Outcome: Progressing     Problem: Prexisting or High Potential for Compromised Skin Integrity  Goal: Skin integrity is maintained or improved  Description: INTERVENTIONS:  - Identify patients  at risk for skin breakdown  - Assess and monitor skin integrity  - Assess and monitor nutrition and hydration status  - Monitor labs   - Assess for incontinence   - Turn and reposition patient  - Assist with mobility/ambulation  - Relieve pressure over bony prominences  - Avoid friction and shearing  - Provide appropriate hygiene as needed including keeping skin clean and dry  - Evaluate need for skin moisturizer/barrier cream  - Collaborate with interdisciplinary team   - Patient/family teaching  - Consider wound care consult   Outcome: Progressing

## 2024-10-07 NOTE — PROGRESS NOTES
The levofloxacin has / have been converted to Oral per [facility] IV-to-PO Auto-Conversion Protocol for Adults as approved by the Pharmacy and Therapeutics Committee. The patient met all eligible criteria:  1) Age = 18 years old   2) Received at least one dose of the IV form   3) Receiving at least one other scheduled oral/enteral medication   4) Tolerating an oral/enteral diet   and did not have any exclusions:   1) Critical care patient   2) Active GI bleed (IF assessing H2RAs or PPIs)   3) Continuous tube feeding (IF assessing cipro, doxycycline, levofloxacin, minocycline, rifampin, or voriconazole)   4) Receiving PO vancomycin (IF assessing metronidazole)   5) Persistent nausea and/or vomiting   6) Ileus or gastrointestinal obstruction   7) Mar/nasogastric tube set for continuous suction   8) Specific order not to automatically convert to PO (in the order's comments or if discussed in the most recent Infectious Disease or primary team's progress notes).

## 2024-10-07 NOTE — ASSESSMENT & PLAN NOTE
Pt with PMHx of multiple sclerosis with neurogenic bladder and chronic SPT tube, diastolic CHF, CVA, diabetic neuropathy, hypertension, diabetes, and GERSON presented to the ED secondary to abdominal pain  Pt suprapubic cath found to be blocked with sediment. This was resolved in the ED with flushing, and patient abdominal pain subsequently improved  Met sepsis criteria possibly secondary to UTI in the setting of a blocked SPT catheter. S/p SP tube exchange 10/7/2024.   Continue Levaquin based on previous susceptibilities. Await cultures

## 2024-10-07 NOTE — CASE MANAGEMENT
Case Management Assessment & Discharge Planning Note    Patient name Mathew Rico  Location East 5 /E5 -* MRN [id number]  : 1950 Date 10/7/2024       Current Admission Date: 10/5/2024  Current Admission Diagnosis:Sepsis due to urinary tract infection  (HCC)   Patient Active Problem List    Diagnosis Date Noted Date Diagnosed    Pain of left hip 2024     Left leg pain 2024     Dizziness 2024     Pruritus 2024     Blurred vision, bilateral 2024     Symptoms of upper respiratory infection (URI) 06/15/2024     Chest pain 2024     Acute encephalopathy 2024     GERSON on CPAP 2024     Fall 2024     Primary hypertension 2024     Type 2 diabetes mellitus without complication, without long-term current use of insulin (HCC) 2024     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HCC) 2024     Multiple sclerosis (HCC) 2024     Urinary retention 2024     Neck pain 2024     Vitamin B deficiency 2024     Generalized weakness 2024     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     Fall 2023     Weakness 2023     Accidental fall from chair 2023     Sepsis due to urinary tract infection  (HCC) 2023     Constipation 2023     Chronic diastolic congestive heart failure (HCC) 2023     Hyponatremia 2023     Excessive daytime sleepiness 2022     Shortness of breath 2022     Pancreatic mass 2020     Pancreatic lesion 2020     Bladder stones 2019     Bladder neck contracture 2019     History of CVA (cerebrovascular accident) 10/21/2018     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HCC) 2017     Diabetic neuropathy (HCC) 2016     Neurogenic bladder 2016     Thyroid nodule 2015     Cervical spinal stenosis 2014     Generalized anxiety disorder 2014     Obstructive sleep apnea 2013     Benign colon polyp 2012     Esophageal reflux 2012      Fatty liver 06/19/2012     Glaucoma 06/19/2012     Hyperlipidemia 06/19/2012     Multiple sclerosis (HCC) 06/19/2012     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) 06/19/2012     Primary hypertension 06/11/2012       LOS (days): 1  Geometric Mean LOS (GMLOS) (days): 3.5  Days to GMLOS:1.8     OBJECTIVE:    Risk of Unplanned Readmission Score: 47.81      Current admission status: Inpatient    Preferred Pharmacy:   [id number] [place] [facility] [address]  [address] [facility] [address]  Phone: [phone] Fax: [phone]    [place], [place] - [address]  [address] [facility] [address]  Phone: [phone] Fax: [phone]    [facility] [place] [place] - [address]  [facility],  [address]  [facility],  First [address] [facility] [address]  Phone: [phone] Fax: [phone]     [facility] - [place], [place] - [address]  [address] [facility] [address]  Phone: [phone] Fax: [phone]    Primary Care Provider: Carolina Kumari MD    Primary Insurance: [facility]  Secondary Insurance:     ASSESSMENT:  Active Health Care Proxies       Jose AdelmaGuzman Health Care Representative - [facility]   Primary Phone: [phone] (Home)            Readmission Root Cause  30 Day Readmission: No    Patient Information  Admitted from:: Home  Mental Status: Alert  During Assessment patient was accompanied by: Not accompanied during assessment  Assessment information provided by:: Other - please comment (chart)  Primary Caregiver: Family  Caregiver's Name:: TrishaGrzegorzGuzman (Brother) [phone] (Home Phone)  Caregiver's Relationship to Patient:: Family Member  Caregiver's Telephone Number:: Guzman Rico (Brother) [phone] (Home Phone)  Support Systems: Family members  County of Residence: [place]  What city do you live in?: [place]  Home entry access options. Select all that apply.: Stairs  Number of steps to enter home.:  1  Type of Current Residence: [place]  Upon entering residence, is there a bedroom on the main floor (no further steps)?: Yes  Upon entering residence, is there a bathroom on the main floor (no further steps)?: Yes  Living Arrangements: Lives w/ Family members  Is patient a ?: No    Activities of Daily Living Prior to Admission  Functional Status: Total dependent  Completes ADLs independently?: No  Level of ADL dependence: Total Dependent  Ambulates independently?: No  Level of ambulatory dependence: Total Dependent  Does patient use assisted devices?: Yes  Assisted Devices (DME) used: Hospital Bed, Walker, Wheelchair, Bedside Commode, Shower Chair  Does patient currently own DME?: Yes  What DME does the patient currently own?: Bedside Commode, Hospital Bed, Walker, Wheelchair  Does patient have a history of Outpatient Therapy (PT/OT)?: Yes  Does the patient have a history of Short-Term Rehab?: Yes  Does patient have a history of HHC?: Yes  Does patient currently have HHC?: Yes    Current Home Health Care  Type of Current Home Care Services: Home health aide, Home PT, Nurse visit  Home Health Agency Name:: Other (LoggedIn)  Current Home Health Follow-Up Provider:: PCP    Patient Information Continued  Does patient have prescription coverage?: Yes  Does patient receive dialysis treatments?: No  Does patient have a history of substance abuse?: No  Does patient have a history of Mental Health Diagnosis?: No    Means of Transportation  Means of Transport to Appts:: Family transport    Social Determinants of Health (SDOH)      Flowsheet Row Most Recent Value   Housing Stability    In the last 12 months, was there a time when you were not able to pay the mortgage or rent on time? N   In the past 12 months, how many times have you moved where you were living? 0   At any time in the past 12 months, were you homeless or living in a shelter (including now)? N   Transportation Needs    In the past 12 months, has lack  of transportation kept you from medical appointments or from getting medications? no   In the past 12 months, has lack of transportation kept you from meetings, work, or from getting things needed for daily living? No   Food Insecurity    Within the past 12 months, you worried that your food would run out before you got the money to buy more. Never true   Within the past 12 months, the food you bought just didn't last and you didn't have money to get more. Never true   Utilities    In the past 12 months has the electric, gas, oil, or water company threatened to shut off services in your home? No     DISCHARGE DETAILS:    Discharge planning discussed with:: left message for pt's son     CM contacted family/caregiver?: Yes  Were Treatment Team discharge recommendations reviewed with patient/caregiver?: Yes  Did patient/caregiver verbalize understanding of patient care needs?: Yes  Were patient/caregiver advised of the risks associated with not following Treatment Team discharge recommendations?: Yes    Contacts  Patient Contacts: Guzman Rico  Relationship to Patient:: Family  Contact Method: Phone  Phone Number: [phone]  Reason/Outcome: Discharge Planning    Requested Home Health Care         Is the patient interested in HHC at discharge?: Yes  Home Health Agency Name:: Other ([employer])    DME Referral Provided  Referral made for DME?: No    Treatment Team Recommendation: Home  Discharge Destination Plan:: Home  Transport at Discharge : Stretcher van, BLS Ambulance     Additional Comments: Cm attempted to meet with pt but was working with RN. Cm left a message for pt's brother and cm called pt's [facility]  but spoke with Kasandra. His normal  is Arina. Cm completed chart review while waiting call back from pt's brother. Pt has [facility] and they provide HHA, SN and PT in home. Pt is bed bound and total dependent at his baseline. He lives with his brother and sister. THey assist with  IADLS and ADLs. Pt required transport home at last admission.

## 2024-10-08 LAB
ANION GAP SERPL CALCULATED.3IONS-SCNC: 6 MMOL/L (ref 4–13)
BASOPHILS # BLD AUTO: 0.08 THOUSANDS/ΜL (ref 0–0.1)
BASOPHILS NFR BLD AUTO: 1 % (ref 0–1)
BUN SERPL-MCNC: 12 MG/DL (ref 5–25)
CALCIUM SERPL-MCNC: 8.7 MG/DL (ref 8.4–10.2)
CARDIAC TROPONIN I PNL SERPL HS: 3 NG/L (ref 8–18)
CHLORIDE SERPL-SCNC: 106 MMOL/L (ref 96–108)
CO2 SERPL-SCNC: 26 MMOL/L (ref 21–32)
CREAT SERPL-MCNC: 0.85 MG/DL (ref 0.6–1.3)
EOSINOPHIL # BLD AUTO: 0.68 THOUSAND/ΜL (ref 0–0.61)
EOSINOPHIL NFR BLD AUTO: 6 % (ref 0–6)
ERYTHROCYTE [DISTWIDTH] IN BLOOD BY AUTOMATED COUNT: 15 % (ref 11.6–15.1)
GFR SERPL CREATININE-BSD FRML MDRD: 85 ML/MIN/1.73SQ M
GLUCOSE SERPL-MCNC: 114 MG/DL (ref 65–140)
GLUCOSE SERPL-MCNC: 135 MG/DL (ref 65–140)
GLUCOSE SERPL-MCNC: 155 MG/DL (ref 65–140)
GLUCOSE SERPL-MCNC: 182 MG/DL (ref 65–140)
GLUCOSE SERPL-MCNC: 195 MG/DL (ref 65–140)
HCT VFR BLD AUTO: 35.8 % (ref 36.5–49.3)
HGB BLD-MCNC: 11.6 G/DL (ref 12–17)
IMM GRANULOCYTES # BLD AUTO: 0.05 THOUSAND/UL (ref 0–0.2)
IMM GRANULOCYTES NFR BLD AUTO: 1 % (ref 0–2)
LYMPHOCYTES # BLD AUTO: 3.66 THOUSANDS/ΜL (ref 0.6–4.47)
LYMPHOCYTES NFR BLD AUTO: 34 % (ref 14–44)
MAGNESIUM SERPL-MCNC: 2 MG/DL (ref 1.9–2.7)
MCH RBC QN AUTO: 28 PG (ref 26.8–34.3)
MCHC RBC AUTO-ENTMCNC: 32.4 G/DL (ref 31.4–37.4)
MCV RBC AUTO: 87 FL (ref 82–98)
MONOCYTES # BLD AUTO: 0.9 THOUSAND/ΜL (ref 0.17–1.22)
MONOCYTES NFR BLD AUTO: 8 % (ref 4–12)
NEUTROPHILS # BLD AUTO: 5.31 THOUSANDS/ΜL (ref 1.85–7.62)
NEUTS SEG NFR BLD AUTO: 50 % (ref 43–75)
NRBC BLD AUTO-RTO: 0 /100 WBCS
PLATELET # BLD AUTO: 296 THOUSANDS/UL (ref 149–390)
PMV BLD AUTO: 9.1 FL (ref 8.9–12.7)
POTASSIUM SERPL-SCNC: 3.8 MMOL/L (ref 3.5–5.3)
RBC # BLD AUTO: 4.14 MILLION/UL (ref 3.88–5.62)
SODIUM SERPL-SCNC: 138 MMOL/L (ref 135–147)
WBC # BLD AUTO: 10.68 THOUSAND/UL (ref 4.31–10.16)

## 2024-10-08 PROCEDURE — 84484 ASSAY OF TROPONIN QUANT: CPT | Performed by: HOSPITALIST

## 2024-10-08 PROCEDURE — 80048 BASIC METABOLIC PNL TOTAL CA: CPT | Performed by: STUDENT IN AN ORGANIZED HEALTH CARE EDUCATION/TRAINING PROGRAM

## 2024-10-08 PROCEDURE — 83735 ASSAY OF MAGNESIUM: CPT | Performed by: STUDENT IN AN ORGANIZED HEALTH CARE EDUCATION/TRAINING PROGRAM

## 2024-10-08 PROCEDURE — 82948 REAGENT STRIP/BLOOD GLUCOSE: CPT

## 2024-10-08 PROCEDURE — 99232 SBSQ HOSP IP/OBS MODERATE 35: CPT | Performed by: HOSPITALIST

## 2024-10-08 PROCEDURE — 85025 COMPLETE CBC W/AUTO DIFF WBC: CPT | Performed by: STUDENT IN AN ORGANIZED HEALTH CARE EDUCATION/TRAINING PROGRAM

## 2024-10-08 RX ORDER — AZELASTINE 1 MG/ML
2 SPRAY, METERED NASAL 2 TIMES DAILY
Status: DISCONTINUED | OUTPATIENT
Start: 2024-10-08 | End: 2024-10-10 | Stop reason: HOSPADM

## 2024-10-08 RX ADMIN — ENOXAPARIN SODIUM 40 MG: 40 INJECTION SUBCUTANEOUS at 08:00

## 2024-10-08 RX ADMIN — LEVOFLOXACIN 750 MG: 750 TABLET, FILM COATED ORAL at 00:28

## 2024-10-08 RX ADMIN — SENNOSIDES AND DOCUSATE SODIUM 2 TABLET: 50; 8.6 TABLET ORAL at 21:55

## 2024-10-08 RX ADMIN — INSULIN LISPRO 1 UNITS: 100 INJECTION, SOLUTION INTRAVENOUS; SUBCUTANEOUS at 21:53

## 2024-10-08 RX ADMIN — LIDOCAINE 1 PATCH: 700 PATCH TOPICAL at 09:08

## 2024-10-08 RX ADMIN — METHENAMINE HIPPURATE 1 G: 1000 TABLET ORAL at 08:00

## 2024-10-08 RX ADMIN — METHENAMINE HIPPURATE 1 G: 1000 TABLET ORAL at 17:22

## 2024-10-08 RX ADMIN — AMLODIPINE BESYLATE 10 MG: 10 TABLET ORAL at 08:00

## 2024-10-08 RX ADMIN — PANTOPRAZOLE SODIUM 40 MG: 40 TABLET, DELAYED RELEASE ORAL at 04:53

## 2024-10-08 RX ADMIN — ACETAMINOPHEN 975 MG: 325 TABLET, FILM COATED ORAL at 04:53

## 2024-10-08 RX ADMIN — MIRTAZAPINE 7.5 MG: 7.5 TABLET, FILM COATED ORAL at 21:52

## 2024-10-08 RX ADMIN — GABAPENTIN 300 MG: 300 CAPSULE ORAL at 13:49

## 2024-10-08 RX ADMIN — LEVOFLOXACIN 750 MG: 750 TABLET, FILM COATED ORAL at 21:58

## 2024-10-08 RX ADMIN — BACLOFEN 5 MG: 10 TABLET ORAL at 17:22

## 2024-10-08 RX ADMIN — BUDESONIDE AND FORMOTEROL FUMARATE DIHYDRATE 2 PUFF: 160; 4.5 AEROSOL RESPIRATORY (INHALATION) at 08:00

## 2024-10-08 RX ADMIN — INSULIN LISPRO 1 UNITS: 100 INJECTION, SOLUTION INTRAVENOUS; SUBCUTANEOUS at 11:49

## 2024-10-08 RX ADMIN — ACETAMINOPHEN 975 MG: 325 TABLET, FILM COATED ORAL at 21:52

## 2024-10-08 RX ADMIN — BUDESONIDE AND FORMOTEROL FUMARATE DIHYDRATE 2 PUFF: 160; 4.5 AEROSOL RESPIRATORY (INHALATION) at 17:22

## 2024-10-08 RX ADMIN — INSULIN LISPRO 1 UNITS: 100 INJECTION, SOLUTION INTRAVENOUS; SUBCUTANEOUS at 17:22

## 2024-10-08 RX ADMIN — BACLOFEN 5 MG: 10 TABLET ORAL at 08:00

## 2024-10-08 RX ADMIN — ATORVASTATIN CALCIUM 80 MG: 80 TABLET, FILM COATED ORAL at 08:00

## 2024-10-08 RX ADMIN — CLOPIDOGREL BISULFATE 75 MG: 75 TABLET ORAL at 08:00

## 2024-10-08 RX ADMIN — GABAPENTIN 600 MG: 300 CAPSULE ORAL at 21:52

## 2024-10-08 RX ADMIN — BACLOFEN 5 MG: 10 TABLET ORAL at 21:52

## 2024-10-08 RX ADMIN — GABAPENTIN 300 MG: 300 CAPSULE ORAL at 08:00

## 2024-10-08 RX ADMIN — PROPRANOLOL HYDROCHLORIDE 60 MG: 60 CAPSULE, EXTENDED RELEASE ORAL at 08:00

## 2024-10-08 NOTE — CASE MANAGEMENT
Case Management Discharge Planning Note    Patient name Mathew Rico  Location East 5 /E5 -* MRN [id number]  : 1950 Date 10/8/2024       Current Admission Date: 10/5/2024  Current Admission Diagnosis:Sepsis due to urinary tract infection  (HCC)   Patient Active Problem List    Diagnosis Date Noted Date Diagnosed    Pain of left hip 2024     Left leg pain 2024     Dizziness 2024     Pruritus 2024     Blurred vision, bilateral 2024     Symptoms of upper respiratory infection (URI) 06/15/2024     Chest pain 2024     Acute encephalopathy 2024     GERSON on CPAP 2024     Fall 2024     Primary hypertension 2024     Type 2 diabetes mellitus without complication, without long-term current use of insulin (HCC) 2024     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HCC) 2024     Multiple sclerosis (HCC) 2024     Urinary retention 2024     Neck pain 2024     Vitamin B deficiency 2024     Generalized weakness 2024     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     Fall 2023     Weakness 2023     Accidental fall from chair 2023     Sepsis due to urinary tract infection  (HCC) 2023     Constipation 2023     Chronic diastolic congestive heart failure (HCC) 2023     Hyponatremia 2023     Excessive daytime sleepiness 2022     Shortness of breath 2022     Pancreatic mass 2020     Pancreatic lesion 2020     Bladder stones 2019     Bladder neck contracture 2019     History of CVA (cerebrovascular accident) 10/21/2018     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HCC) 2017     Diabetic neuropathy (HCC) 2016     Neurogenic bladder 2016     Thyroid nodule 2015     Cervical spinal stenosis 2014     Generalized anxiety disorder 2014     Obstructive sleep apnea 2013     Benign colon polyp 2012     Esophageal reflux 2012     Fatty  "liver 06/19/2012     Glaucoma 06/19/2012     Hyperlipidemia 06/19/2012     Multiple sclerosis (HCC) 06/19/2012     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) 06/19/2012     Primary hypertension 06/11/2012       LOS (days): 2  Geometric Mean LOS (GMLOS) (days): 3.5  Days to GMLOS:1     OBJECTIVE:  Risk of Unplanned Readmission Score: 50.51         Current admission status: Inpatient   Preferred Pharmacy:   [facility]/pharmacy #[id number] - [place] - [address]  [address] [facility] [address]  Phone: [phone] Fax: [phone]    [place], [place] - [address] [address] [facility] [address]  Phone: [phone] Fax: [phone]    [facility] [place] - [address]  [facility],  [address]  [facility],  First [address] [facility] [address]  Phone: [phone] Fax: [phone]     [facility] [place], [place] - [address]  [address] [facility] [address]  Phone: [phone] Fax: [phone]    Primary Care Provider: Carolina Kumari MD    Primary Insurance: [facility]  Secondary Insurance:     DISCHARGE DETAILS: Voice mail recveived from [facility] requesting update on pt. CM called to [facility] and left a  requesting a call back. CM spoke with Arina [facility], who was given an update of \"awaiting cultures\" for disposition. Arina requesting that AVS be faxed to [facility] at [phone], will update CM handoff with same.                                                                                                                         "

## 2024-10-08 NOTE — PROGRESS NOTES
"Progress Note - Hospitalist   Name: Mathew Rico 74 y.o. male I MRN: [id number]  Unit/Bed#: E5 -01 I Date of Admission: 10/5/2024   Date of Service: 10/8/2024 I Hospital Day: 2    Assessment & Plan  Sepsis due to urinary tract infection  (HCC)  Pt with PMHx of multiple sclerosis with neurogenic bladder and chronic SPT tube, diastolic CHF, CVA, diabetic neuropathy, hypertension, diabetes, and GERSON presented to the ED secondary to abdominal pain  Pt suprapubic cath found to be blocked with sediment. This was resolved in the ED with flushing, and patient abdominal pain subsequently improved  Met sepsis criteria possibly secondary to UTI in the setting of a blocked SPT catheter. S/p SP tube exchange 10/7/2024.       Currently on empiric Levaquin    Await urine cultures and sensitivities.   Constipation  CT a/p demonstrating \"Fecal retention with large amount of stool within the rectal vault. There is associated wall thickening of the rectum and adjacent perirectal fatty stranding somewhat similar in appearance to the prior\"  Continue bowel regimen  BM 10/7/2024  Hyperlipidemia  Continue statin  Primary hypertension  Controlled  Continue amlodipine  On propanolol given history of essential tremor  History of CVA (cerebrovascular accident)  History of left hemisphere lacunar infarct in 2018  Continue Plavix and statin therapy  Chronic diastolic congestive heart failure (HCC)  Wt Readings from Last 3 Encounters:   10/08/24 73.9 kg (162 lb 14.7 oz)   09/07/24 69.9 kg (154 lb 1.6 oz)   08/31/24 71.3 kg (157 lb 3 oz)     Pt appears euvolemic on exam  Last ECHO 10/18 EF 75% with grade 1 DD  Monitor daily weights, I/Os    GERSON on CPAP  Continue cpap h.s.  Type 2 diabetes mellitus without complication, without long-term current use of insulin (HCC)  Lab Results   Component Value Date    HGBA1C 9.0 (H) 10/06/2024       Recent Labs     10/07/24  1637 10/07/24  2343 10/08/24  0805 10/08/24  1114   POCGLU 166* 174* 114 182* "       Blood Sugar Average: Last 72 hrs:  (P) 140.8Hold metformin  Well controlled on HISS    Multiple sclerosis (HCC)  S/p suprapubic cath for neurogenic bladder. Bed bound at baseline.  Continue baclofen  Continue outpatient neurologic follow-up   Neurogenic bladder      VTE Pharmacologic Prophylaxis:                 Current Length of Stay: 2 day(s)  Current Patient Status: Inpatient   Certification Statement:     Discharge Plan:         Subjective    Doing well  No fever or chills  No abdominal pain.    Objective   Vitals:   Temp (24hrs), Av.7 °F (36.5 °C), Min:97.6 °F (36.4 °C), Max:97.8 °F (36.6 °C)    Temp:  [97.6 °F (36.4 °C)-97.8 °F (36.6 °C)] 97.8 °F (36.6 °C)  HR:  [75-78] 75  Resp:  [18] 18  BP: (116-150)/(50-63) 150/63  SpO2:  [95 %-97 %] 96 %  Body mass index is 27.11 kg/m².         Physical Exam  Vitals and nursing note reviewed.   Constitutional:       General: He is not in acute distress.     Appearance: He is well-developed.   HENT:      Head: Normocephalic and atraumatic.   Eyes:      Conjunctiva/sclera: Conjunctivae normal.   Cardiovascular:      Rate and Rhythm: Normal rate and regular rhythm.      Heart sounds: No murmur heard.  Pulmonary:      Effort: Pulmonary effort is normal. No respiratory distress.      Breath sounds: Normal breath sounds.   Abdominal:      Palpations: Abdomen is soft.      Tenderness: There is no abdominal tenderness.   Musculoskeletal:         General: No swelling.      Cervical back: Neck supple.      Right lower leg: No edema.      Left lower leg: No edema.   Skin:     General: Skin is warm and dry.      Capillary Refill: Capillary refill takes less than 2 seconds.   Neurological:      Mental Status: He is alert.   Psychiatric:         Mood and Affect: Mood normal.           Lab results  Results from last 7 days   Lab Units 10/08/24  0526 10/06/24  0534 10/05/24  2040   WBC Thousand/uL 10.68* 14.52* 20.15*   HEMOGLOBIN g/dL 11.6* 13.8 16.5   PLATELETS Thousands/uL  296 321 386   MCV fL 87 83 84     Results from last 7 days   Lab Units 10/08/24  0526 10/06/24  0534 10/05/24  2040   SODIUM mmol/L 138 135 136   POTASSIUM mmol/L 3.8 4.0 4.1   CHLORIDE mmol/L 106 103 101   CO2 mmol/L 26 22 22   ANION GAP mmol/L 6 10 13   BUN mg/dL 12 15 18   CREATININE mg/dL 0.85 0.83 0.95   CALCIUM mg/dL 8.7 9.3 10.4*   ALBUMIN g/dL  --   --  4.3   TOTAL BILIRUBIN mg/dL  --   --  0.43   ALK PHOS U/L  --   --  91   ALT U/L  --   --  11   AST U/L  --   --  15   EGFR ml/min/1.73sq m 85 86 77   GLUCOSE RANDOM mg/dL 135 113 153*     Results from last 7 days   Lab Units 10/08/24  0526 10/05/24  2040   MAGNESIUM mg/dL 2.0 1.5*             Last 24 Hours Medication List:     Current Facility-Administered Medications:     acetaminophen (TYLENOL) tablet 975 mg, Q8H CIERA    albuterol inhalation solution 2.5 mg, Q6H PRN    amLODIPine (NORVASC) tablet 10 mg, Daily **AND** atorvastatin (LIPITOR) tablet 80 mg, Daily    baclofen tablet 5 mg, TID    bisacodyl (DULCOLAX) rectal suppository 10 mg, Daily PRN    budesonide-formoterol (SYMBICORT) 160-4.5 mcg/act inhaler 2 puff, BID    clopidogrel (PLAVIX) tablet 75 mg, Daily    cromolyn (NASALCHROM) nasal spray 1 spray, TID    enoxaparin (LOVENOX) subcutaneous injection 40 mg, Daily    gabapentin (NEURONTIN) capsule 300 mg, 2 times per day    gabapentin (NEURONTIN) capsule 600 mg, HS    insulin lispro (HumALOG/ADMELOG) 100 units/mL subcutaneous injection 1-5 Units, TID AC **AND** Fingerstick Glucose (POCT), TID AC    insulin lispro (HumALOG/ADMELOG) 100 units/mL subcutaneous injection 1-5 Units, HS    levofloxacin (LEVAQUIN) tablet 750 mg, Q24H    lidocaine (LIDODERM) 5 % patch 1 patch, Daily    meclizine (ANTIVERT) tablet 12.5 mg, Q8H PRN    melatonin tablet 3 mg, HS PRN    methenamine hippurate (HIPREX) tablet 1 g, BID With Meals    mirtazapine (REMERON) tablet 7.5 mg, HS    morphine injection 2 mg, Q6H PRN    oxyCODONE (ROXICODONE) immediate release tablet 10 mg, Q6H  PRN    oxyCODONE (ROXICODONE) IR tablet 5 mg, Q6H PRN    pantoprazole (PROTONIX) EC tablet 40 mg, Early Morning    polyethylene glycol (MIRALAX) packet 17 g, BID    propranolol (INDERAL LA) 24 hr capsule 60 mg, Daily    senna-docusate sodium (SENOKOT S) 8.6-50 mg per tablet 2 tablet, HS

## 2024-10-08 NOTE — PLAN OF CARE
Problem: PAIN - ADULT  Goal: Verbalizes/displays adequate comfort level or baseline comfort level  Description: Interventions:  - Encourage patient to monitor pain and request assistance  - Assess pain using appropriate pain scale  - Administer analgesics based on type and severity of pain and evaluate response  - Implement non-pharmacological measures as appropriate and evaluate response  - Consider cultural and social influences on pain and pain management  - Notify physician/advanced practitioner if interventions unsuccessful or patient reports new pain  Outcome: Progressing     Problem: INFECTION - ADULT  Goal: Absence or prevention of progression during hospitalization  Description: INTERVENTIONS:  - Assess and monitor for signs and symptoms of infection  - Monitor lab/diagnostic results  - Monitor all insertion sites, i.e. indwelling lines, tubes, and drains  - Monitor endotracheal if appropriate and nasal secretions for changes in amount and color  - Douglas appropriate cooling/warming therapies per order  - Administer medications as ordered  - Instruct and encourage patient and family to use good hand hygiene technique  - Identify and instruct in appropriate isolation precautions for identified infection/condition  Outcome: Progressing  Goal: Absence of fever/infection during neutropenic period  Description: INTERVENTIONS:  - Monitor WBC    Outcome: Progressing     Problem: SAFETY ADULT  Goal: Patient will remain free of falls  Description: INTERVENTIONS:  - Educate patient/family on patient safety including physical limitations  - Instruct patient to call for assistance with activity   - Consult OT/PT to assist with strengthening/mobility   - Keep Call bell within reach  - Keep bed low and locked with side rails adjusted as appropriate  - Keep care items and personal belongings within reach  - Apply yellow socks and bracelet for high fall risk patients  - Consider moving patient to room near nurses  station  Outcome: Progressing  Goal: Maintain or return to baseline ADL function  Description: INTERVENTIONS:  -  Assess patient's ability to carry out ADLs; assess patient's baseline for ADL function and identify physical deficits which impact ability to perform ADLs (bathing, care of mouth/teeth, toileting, grooming, dressing, etc.)  - Assess/evaluate cause of self-care deficits   - Assess range of motion  - Assess patient's mobility; develop plan if impaired  - Assess patient's need for assistive devices and provide as appropriate  - Encourage maximum independence but intervene and supervise when necessary  - Involve family in performance of ADLs  - Assess for home care needs following discharge   - Consider OT consult to assist with ADL evaluation and planning for discharge  - Provide patient education as appropriate  Outcome: Progressing  Goal: Maintains/Returns to pre admission functional level  Description: INTERVENTIONS:  - Perform AM-PAC 6 Click Basic Mobility/ Daily Activity assessment daily.  - Set and communicate daily mobility goal to care team and patient/family/caregiver.   - Record patient progress and toleration of activity level   Outcome: Progressing     Problem: DISCHARGE PLANNING  Goal: Discharge to home or other facility with appropriate resources  Description: INTERVENTIONS:  - Identify barriers to discharge w/patient and caregiver  - Arrange for needed discharge resources and transportation as appropriate  - Identify discharge learning needs (meds, wound care, etc.)  - Arrange for interpretive services to assist at discharge as needed  - Refer to Case Management Department for coordinating discharge planning if the patient needs post-hospital services based on physician/advanced practitioner order or complex needs related to functional status, cognitive ability, or social support system  Outcome: Progressing     Problem: Knowledge Deficit  Goal: Patient/family/caregiver demonstrates  understanding of disease process, treatment plan, medications, and discharge instructions  Description: Complete learning assessment and assess knowledge base.  Interventions:  - Provide teaching at level of understanding  - Provide teaching via preferred learning methods  Outcome: Progressing     Problem: Nutrition/Hydration-ADULT  Goal: Nutrient/Hydration intake appropriate for improving, restoring or maintaining nutritional needs  Description: Monitor and assess patient's nutrition/hydration status for malnutrition. Collaborate with interdisciplinary team and initiate plan and interventions as ordered.  Monitor patient's weight and dietary intake as ordered or per policy. Utilize nutrition screening tool and intervene as necessary. Determine patient's food preferences and provide high-protein, high-caloric foods as appropriate.     INTERVENTIONS:  - Monitor oral intake, urinary output, labs, and treatment plans  - Assess nutrition and hydration status and recommend course of action  - Evaluate amount of meals eaten  - Assist patient with eating if necessary   - Allow adequate time for meals  - Recommend/ encourage appropriate diets, oral nutritional supplements, and vitamin/mineral supplements  - Order, calculate, and assess calorie counts as needed  - Recommend, monitor, and adjust tube feedings and TPN/PPN based on assessed needs  - Assess need for intravenous fluids  - Provide specific nutrition/hydration education as appropriate  - Include patient/family/caregiver in decisions related to nutrition  Outcome: Progressing     Problem: Prexisting or High Potential for Compromised Skin Integrity  Goal: Skin integrity is maintained or improved  Description: INTERVENTIONS:  - Identify patients at risk for skin breakdown  - Assess and monitor skin integrity  - Assess and monitor nutrition and hydration status  - Monitor labs   - Assess for incontinence   - Turn and reposition patient  - Assist with  mobility/ambulation  - Relieve pressure over bony prominences  - Avoid friction and shearing  - Provide appropriate hygiene as needed including keeping skin clean and dry  - Evaluate need for skin moisturizer/barrier cream  - Collaborate with interdisciplinary team   - Patient/family teaching  - Consider wound care consult   Outcome: Progressing

## 2024-10-08 NOTE — ASSESSMENT & PLAN NOTE
Lab Results   Component Value Date    HGBA1C 9.0 (H) 10/06/2024       Recent Labs     10/07/24  1637 10/07/24  2343 10/08/24  0805 10/08/24  1114   POCGLU 166* 174* 114 182*       Blood Sugar Average: Last 72 hrs:  (P) 140.8Hold metformin  Well controlled on HISS

## 2024-10-08 NOTE — RESTORATIVE TECHNICIAN NOTE
Restorative Technician Note      Patient Name: Mathew Rico     Restorative Tech Visit Date: 10/08/24  Note Type: Mobility  Patient Position Upon Consult: Supine  Activity Performed: Ambulated; Repositioned  Patient Position at End of Consult: All needs within reach; Supine; Bed/Chair alarm activated        ns

## 2024-10-08 NOTE — ASSESSMENT & PLAN NOTE
Pt with PMHx of multiple sclerosis with neurogenic bladder and chronic SPT tube, diastolic CHF, CVA, diabetic neuropathy, hypertension, diabetes, and GERSON presented to the ED secondary to abdominal pain  Pt suprapubic cath found to be blocked with sediment. This was resolved in the ED with flushing, and patient abdominal pain subsequently improved  Met sepsis criteria possibly secondary to UTI in the setting of a blocked SPT catheter. S/p SP tube exchange 10/7/2024.       Currently on empiric Levaquin    Await urine cultures and sensitivities.

## 2024-10-08 NOTE — ASSESSMENT & PLAN NOTE
Wt Readings from Last 3 Encounters:   10/08/24 73.9 kg (162 lb 14.7 oz)   09/07/24 69.9 kg (154 lb 1.6 oz)   08/31/24 71.3 kg (157 lb 3 oz)     Pt appears euvolemic on exam  Last ECHO 10/18 EF 75% with grade 1 DD  Monitor daily weights, I/Os

## 2024-10-08 NOTE — ASSESSMENT & PLAN NOTE
S/p suprapubic cath for neurogenic bladder. Bed bound at baseline.  Continue baclofen  Continue outpatient neurologic follow-up    Stelara Counseling:  I discussed with the patient the risks of ustekinumab including but not limited to immunosuppression, malignancy, posterior leukoencephalopathy syndrome, and serious infections.  The patient understands that monitoring is required including a PPD at baseline and must alert us or the primary physician if symptoms of infection or other concerning signs are noted.

## 2024-10-09 LAB
ANION GAP SERPL CALCULATED.3IONS-SCNC: 5 MMOL/L (ref 4–13)
BASOPHILS # BLD AUTO: 0.08 THOUSANDS/ΜL (ref 0–0.1)
BASOPHILS NFR BLD AUTO: 1 % (ref 0–1)
BUN SERPL-MCNC: 10 MG/DL (ref 5–25)
CALCIUM SERPL-MCNC: 9.6 MG/DL (ref 8.4–10.2)
CHLORIDE SERPL-SCNC: 106 MMOL/L (ref 96–108)
CO2 SERPL-SCNC: 30 MMOL/L (ref 21–32)
CREAT SERPL-MCNC: 0.83 MG/DL (ref 0.6–1.3)
EOSINOPHIL # BLD AUTO: 0.63 THOUSAND/ΜL (ref 0–0.61)
EOSINOPHIL NFR BLD AUTO: 6 % (ref 0–6)
ERYTHROCYTE [DISTWIDTH] IN BLOOD BY AUTOMATED COUNT: 15 % (ref 11.6–15.1)
GFR SERPL CREATININE-BSD FRML MDRD: 86 ML/MIN/1.73SQ M
GLUCOSE SERPL-MCNC: 140 MG/DL (ref 65–140)
GLUCOSE SERPL-MCNC: 144 MG/DL (ref 65–140)
GLUCOSE SERPL-MCNC: 197 MG/DL (ref 65–140)
GLUCOSE SERPL-MCNC: 238 MG/DL (ref 65–140)
GLUCOSE SERPL-MCNC: 248 MG/DL (ref 65–140)
HCT VFR BLD AUTO: 43.8 % (ref 36.5–49.3)
HGB BLD-MCNC: 13.8 G/DL (ref 12–17)
IMM GRANULOCYTES # BLD AUTO: 0.04 THOUSAND/UL (ref 0–0.2)
IMM GRANULOCYTES NFR BLD AUTO: 0 % (ref 0–2)
LYMPHOCYTES # BLD AUTO: 3.31 THOUSANDS/ΜL (ref 0.6–4.47)
LYMPHOCYTES NFR BLD AUTO: 32 % (ref 14–44)
MAGNESIUM SERPL-MCNC: 2 MG/DL (ref 1.9–2.7)
MCH RBC QN AUTO: 26.7 PG (ref 26.8–34.3)
MCHC RBC AUTO-ENTMCNC: 31.5 G/DL (ref 31.4–37.4)
MCV RBC AUTO: 85 FL (ref 82–98)
MONOCYTES # BLD AUTO: 0.73 THOUSAND/ΜL (ref 0.17–1.22)
MONOCYTES NFR BLD AUTO: 7 % (ref 4–12)
NEUTROPHILS # BLD AUTO: 5.43 THOUSANDS/ΜL (ref 1.85–7.62)
NEUTS SEG NFR BLD AUTO: 54 % (ref 43–75)
NRBC BLD AUTO-RTO: 0 /100 WBCS
PLATELET # BLD AUTO: 342 THOUSANDS/UL (ref 149–390)
PMV BLD AUTO: 8.9 FL (ref 8.9–12.7)
POTASSIUM SERPL-SCNC: 4.1 MMOL/L (ref 3.5–5.3)
RBC # BLD AUTO: 5.17 MILLION/UL (ref 3.88–5.62)
SODIUM SERPL-SCNC: 141 MMOL/L (ref 135–147)
WBC # BLD AUTO: 10.22 THOUSAND/UL (ref 4.31–10.16)

## 2024-10-09 PROCEDURE — 85025 COMPLETE CBC W/AUTO DIFF WBC: CPT | Performed by: HOSPITALIST

## 2024-10-09 PROCEDURE — 80048 BASIC METABOLIC PNL TOTAL CA: CPT | Performed by: HOSPITALIST

## 2024-10-09 PROCEDURE — 99232 SBSQ HOSP IP/OBS MODERATE 35: CPT

## 2024-10-09 PROCEDURE — 83735 ASSAY OF MAGNESIUM: CPT | Performed by: HOSPITALIST

## 2024-10-09 PROCEDURE — 82948 REAGENT STRIP/BLOOD GLUCOSE: CPT

## 2024-10-09 RX ADMIN — BACLOFEN 5 MG: 10 TABLET ORAL at 17:01

## 2024-10-09 RX ADMIN — MIRTAZAPINE 7.5 MG: 7.5 TABLET, FILM COATED ORAL at 21:34

## 2024-10-09 RX ADMIN — METHENAMINE HIPPURATE 1 G: 1000 TABLET ORAL at 17:03

## 2024-10-09 RX ADMIN — ACETAMINOPHEN 975 MG: 325 TABLET, FILM COATED ORAL at 05:36

## 2024-10-09 RX ADMIN — BUDESONIDE AND FORMOTEROL FUMARATE DIHYDRATE 2 PUFF: 160; 4.5 AEROSOL RESPIRATORY (INHALATION) at 17:03

## 2024-10-09 RX ADMIN — AZELASTINE 2 SPRAY: 1 SPRAY, METERED NASAL at 17:03

## 2024-10-09 RX ADMIN — PANTOPRAZOLE SODIUM 40 MG: 40 TABLET, DELAYED RELEASE ORAL at 05:36

## 2024-10-09 RX ADMIN — ATORVASTATIN CALCIUM 80 MG: 80 TABLET, FILM COATED ORAL at 08:39

## 2024-10-09 RX ADMIN — METHENAMINE HIPPURATE 1 G: 1000 TABLET ORAL at 08:40

## 2024-10-09 RX ADMIN — BACLOFEN 5 MG: 10 TABLET ORAL at 21:34

## 2024-10-09 RX ADMIN — GABAPENTIN 300 MG: 300 CAPSULE ORAL at 08:39

## 2024-10-09 RX ADMIN — ENOXAPARIN SODIUM 40 MG: 40 INJECTION SUBCUTANEOUS at 08:40

## 2024-10-09 RX ADMIN — INSULIN LISPRO 2 UNITS: 100 INJECTION, SOLUTION INTRAVENOUS; SUBCUTANEOUS at 21:36

## 2024-10-09 RX ADMIN — LIDOCAINE 1 PATCH: 700 PATCH TOPICAL at 08:41

## 2024-10-09 RX ADMIN — ACETAMINOPHEN 975 MG: 325 TABLET, FILM COATED ORAL at 21:34

## 2024-10-09 RX ADMIN — INSULIN LISPRO 1 UNITS: 100 INJECTION, SOLUTION INTRAVENOUS; SUBCUTANEOUS at 13:16

## 2024-10-09 RX ADMIN — BACLOFEN 5 MG: 10 TABLET ORAL at 08:39

## 2024-10-09 RX ADMIN — AZELASTINE 2 SPRAY: 1 SPRAY, METERED NASAL at 08:40

## 2024-10-09 RX ADMIN — BUDESONIDE AND FORMOTEROL FUMARATE DIHYDRATE 2 PUFF: 160; 4.5 AEROSOL RESPIRATORY (INHALATION) at 08:40

## 2024-10-09 RX ADMIN — GABAPENTIN 600 MG: 300 CAPSULE ORAL at 21:34

## 2024-10-09 RX ADMIN — AMLODIPINE BESYLATE 10 MG: 10 TABLET ORAL at 08:39

## 2024-10-09 RX ADMIN — INSULIN LISPRO 2 UNITS: 100 INJECTION, SOLUTION INTRAVENOUS; SUBCUTANEOUS at 17:00

## 2024-10-09 RX ADMIN — CLOPIDOGREL BISULFATE 75 MG: 75 TABLET ORAL at 08:39

## 2024-10-09 RX ADMIN — POLYETHYLENE GLYCOL 3350 17 G: 17 POWDER, FOR SOLUTION ORAL at 08:40

## 2024-10-09 RX ADMIN — ACETAMINOPHEN 975 MG: 325 TABLET, FILM COATED ORAL at 13:17

## 2024-10-09 RX ADMIN — PROPRANOLOL HYDROCHLORIDE 60 MG: 60 CAPSULE, EXTENDED RELEASE ORAL at 08:40

## 2024-10-09 NOTE — PROGRESS NOTES
"Progress Note - Hospitalist   Name: Mathew Rico 74 y.o. male I MRN: [id number]  Unit/Bed#: E5 -01 I Date of Admission: 10/5/2024   Date of Service: 10/9/2024 I Hospital Day: 3    Assessment & Plan  Sepsis due to urinary tract infection  (HCC)  Pt with PMHx of multiple sclerosis with neurogenic bladder and chronic SPT tube, diastolic CHF, CVA, diabetic neuropathy, hypertension, diabetes, and GERSON presented to the ED secondary to abdominal pain  Pt suprapubic cath found to be blocked with sediment. This was resolved in the ED with flushing, and patient abdominal pain subsequently improved  Met sepsis criteria possibly secondary to UTI in the setting of a blocked SPT catheter. S/p SP tube exchange 10/7/2024.     Urine culture with 30-39,000 MSSA  Currently on empiric Levaquin, will end today.     Constipation  CT a/p demonstrating \"Fecal retention with large amount of stool within the rectal vault. There is associated wall thickening of the rectum and adjacent perirectal fatty stranding somewhat similar in appearance to the prior\"  Continue bowel regimen  BM 10/7/2024  Hyperlipidemia  Continue statin  Primary hypertension  Controlled  Continue amlodipine  On propanolol given history of essential tremor  History of CVA (cerebrovascular accident)  History of left hemisphere lacunar infarct in 2018  Continue Plavix and statin therapy  Chronic diastolic congestive heart failure (HCC)  Wt Readings from Last 3 Encounters:   10/09/24 73 kg (160 lb 15 oz)   09/07/24 69.9 kg (154 lb 1.6 oz)   08/31/24 71.3 kg (157 lb 3 oz)     Pt appears euvolemic on exam  Last ECHO 10/18 EF 75% with grade 1 DD  Monitor daily weights, I/Os    GERSON on CPAP  Continue cpap h.s.  Type 2 diabetes mellitus without complication, without long-term current use of insulin (HCC)  Lab Results   Component Value Date    HGBA1C 9.0 (H) 10/06/2024       Recent Labs     10/08/24  2116 10/09/24  0823 10/09/24  1146 10/09/24  1621   POCGLU 155* 140 197* " 238*       Blood Sugar Average: Last 72 hrs:  (P) [id number] metformin  Well controlled on HISS    Multiple sclerosis (HCC)  S/p suprapubic cath for neurogenic bladder. Bed bound at baseline.  Continue baclofen  Continue outpatient neurologic follow-up   Neurogenic bladder      VTE Pharmacologic Prophylaxis: VTE Score: 7 High Risk (Score >/= 5) - Pharmacological DVT Prophylaxis Ordered: enoxaparin (Lovenox). Sequential Compression Devices Ordered.    Mobility:   Basic Mobility Inpatient Raw Score: 6  JH-HLM Goal: 2: Bed activities/Dependent transfer  JH-HLM Achieved: 2: Bed activities/Dependent transfer  JH-HLM Goal achieved. Continue to encourage appropriate mobility.    Patient Centered Rounds: I performed bedside rounds with nursing staff today.   Discussions with Specialists or Other Care Team Provider: Case management    Education and Discussions with Family / Patient:  unable to contact pt's brother via phone.     Current Length of Stay: 3 day(s)  Current Patient Status: Inpatient   Certification Statement: The patient will continue to require additional inpatient hospital stay due to placement  Discharge Plan: Anticipate discharge tomorrow to discharge location to be determined pending rehab evaluations.    Code Status: Level 1 - Full Code    Subjective   Patient feels better today.  However nursing reports that patient may benefit from physical therapy evaluation.  Will obtain this evaluation and then discharge patient probably tomorrow.    Objective :  Temp:  [97.2 °F (36.2 °C)-97.6 °F (36.4 °C)] 97.4 °F (36.3 °C)  HR:  [60-77] 68  BP: (116-162)/(54-77) 133/54  Resp:  [18-19] 19  SpO2:  [94 %-96 %] 95 %  O2 Device: None (Room air)    Body mass index is 26.78 kg/m².     Input and Output Summary (last 24 hours):     Intake/Output Summary (Last 24 hours) at 10/9/2024 1648  Last data filed at 10/9/2024 1106  Gross per 24 hour   Intake 240 ml   Output 2650 ml   Net -2410 ml       Physical  Exam  Vitals and nursing note reviewed.   Constitutional:       General: He is not in acute distress.     Appearance: He is well-developed.   HENT:      Head: Normocephalic and atraumatic.   Eyes:      Conjunctiva/sclera: Conjunctivae normal.   Cardiovascular:      Rate and Rhythm: Normal rate and regular rhythm.      Heart sounds: No murmur heard.  Pulmonary:      Effort: Pulmonary effort is normal. No respiratory distress.      Breath sounds: Normal breath sounds.   Abdominal:      General: Bowel sounds are normal. There is no distension.      Palpations: Abdomen is soft.      Tenderness: There is no abdominal tenderness.   Genitourinary:     Comments: Suprapubic catheter in place,  Musculoskeletal:         General: No swelling.      Cervical back: Neck supple.      Right lower leg: No edema.      Left lower leg: No edema.   Skin:     General: Skin is warm and dry.      Capillary Refill: Capillary refill takes less than 2 seconds.   Neurological:      Mental Status: He is alert.   Psychiatric:         Mood and Affect: Mood normal.           Lines/Drains:  Lines/Drains/Airways       Active Status       Name Placement date Placement time Site Days    Suprapubic Catheter 24 Fr. 10/07/24  1340  --  2                            Lab Results: I have reviewed the following results:   Results from last 7 days   Lab Units 10/09/24  0536   WBC Thousand/uL 10.22*   HEMOGLOBIN g/dL 13.8   HEMATOCRIT % 43.8   PLATELETS Thousands/uL 342   SEGS PCT % 54   LYMPHO PCT % 32   MONO PCT % 7   EOS PCT % 6     Results from last 7 days   Lab Units 10/09/24  0536 10/06/24  0534 10/05/24  2040   SODIUM mmol/L 141   < > 136   POTASSIUM mmol/L 4.1   < > 4.1   CHLORIDE mmol/L 106   < > 101   CO2 mmol/L 30   < > 22   BUN mg/dL 10   < > 18   CREATININE mg/dL 0.83   < > 0.95   ANION GAP mmol/L 5   < > 13   CALCIUM mg/dL 9.6   < > 10.4*   ALBUMIN g/dL  --   --  4.3   TOTAL BILIRUBIN mg/dL  --   --  0.43   ALK PHOS U/L  --   --  91   ALT U/L  --    --  11   AST U/L  --   --  15   GLUCOSE RANDOM mg/dL 144*   < > 153*    < > = values in this interval not displayed.         Results from last 7 days   Lab Units 10/09/24  1621 10/09/24  1146 10/09/24  0823 10/08/24  2116 10/08/24  1616 10/08/24  1114 10/08/24  0805 10/07/24  2343 10/07/24  1637 10/07/24  1136 10/07/24  0734 10/06/24  2220   POC GLUCOSE mg/dl 238* 197* 140 155* 195* 182* 114 174* 166* 150* 83 167*     Results from last 7 days   Lab Units 10/06/24  0534   HEMOGLOBIN A1C % 9.0*     Results from last 7 days   Lab Units 10/07/24  0516 10/05/24  2340 10/05/24  2125 10/05/24  2040   LACTIC ACID mmol/L  --  1.3 2.3*  --    PROCALCITONIN ng/ml 0.07  --   --  0.07       Recent Cultures (last 7 days):   Results from last 7 days   Lab Units 10/05/24  2127 10/05/24  2112 10/05/24  2040   BLOOD CULTURE   --  No Growth at 72 hrs. No Growth at 72 hrs.   URINE CULTURE  30,000-39,000 cfu/ml Staphylococcus aureus*  40,000-49,000 cfu/ml  --   --          Last 24 Hours Medication List:     Current Facility-Administered Medications:     acetaminophen (TYLENOL) tablet 975 mg, Q8H CIERA    albuterol inhalation solution 2.5 mg, Q6H PRN    amLODIPine (NORVASC) tablet 10 mg, Daily **AND** atorvastatin (LIPITOR) tablet 80 mg, Daily    azelastine (ASTELIN) 0.1 % nasal spray 2 spray, BID    baclofen tablet 5 mg, TID    bisacodyl (DULCOLAX) rectal suppository 10 mg, Daily PRN    budesonide-formoterol (SYMBICORT) 160-4.5 mcg/act inhaler 2 puff, BID    clopidogrel (PLAVIX) tablet 75 mg, Daily    enoxaparin (LOVENOX) subcutaneous injection 40 mg, Daily    gabapentin (NEURONTIN) capsule 300 mg, 2 times per day    gabapentin (NEURONTIN) capsule 600 mg, HS    insulin lispro (HumALOG/ADMELOG) 100 units/mL subcutaneous injection 1-5 Units, TID AC **AND** Fingerstick Glucose (POCT), TID AC    insulin lispro (HumALOG/ADMELOG) 100 units/mL subcutaneous injection 1-5 Units, HS    lidocaine (LIDODERM) 5 % patch 1 patch, Daily    meclizine  (ANTIVERT) tablet 12.5 mg, Q8H PRN    melatonin tablet 3 mg, HS PRN    methenamine hippurate (HIPREX) tablet 1 g, BID With Meals    mirtazapine (REMERON) tablet 7.5 mg, HS    morphine injection 2 mg, Q6H PRN    oxyCODONE (ROXICODONE) immediate release tablet 10 mg, Q6H PRN    oxyCODONE (ROXICODONE) IR tablet 5 mg, Q6H PRN    pantoprazole (PROTONIX) EC tablet 40 mg, Early Morning    polyethylene glycol (MIRALAX) packet 17 g, BID    propranolol (INDERAL LA) 24 hr capsule 60 mg, Daily    senna-docusate sodium (SENOKOT S) 8.6-50 mg per tablet 2 tablet, HS    Administrative Statements   Today, Patient Was Seen By: Viky Mello DO      **Please Note: This note may have been constructed using a voice recognition system.**

## 2024-10-09 NOTE — PLAN OF CARE
Problem: PAIN - ADULT  Goal: Verbalizes/displays adequate comfort level or baseline comfort level  Description: Interventions:  - Encourage patient to monitor pain and request assistance  - Assess pain using appropriate pain scale  - Administer analgesics based on type and severity of pain and evaluate response  - Implement non-pharmacological measures as appropriate and evaluate response  - Consider cultural and social influences on pain and pain management  - Notify physician/advanced practitioner if interventions unsuccessful or patient reports new pain  Outcome: Progressing     Problem: INFECTION - ADULT  Goal: Absence or prevention of progression during hospitalization  Description: INTERVENTIONS:  - Assess and monitor for signs and symptoms of infection  - Monitor lab/diagnostic results  - Monitor all insertion sites, i.e. indwelling lines, tubes, and drains  - Monitor endotracheal if appropriate and nasal secretions for changes in amount and color  - [place] appropriate cooling/warming therapies per order  - Administer medications as ordered  - Instruct and encourage patient and family to use good hand hygiene technique  - Identify and instruct in appropriate isolation precautions for identified infection/condition  Outcome: Progressing  Goal: Absence of fever/infection during neutropenic period  Description: INTERVENTIONS:  - Monitor WBC    Outcome: Progressing     Problem: SAFETY ADULT  Goal: Patient will remain free of falls  Description: INTERVENTIONS:  - Educate patient/family on patient safety including physical limitations  - Instruct patient to call for assistance with activity   - Consult OT/PT to assist with strengthening/mobility   - Keep Call bell within reach  - Keep bed low and locked with side rails adjusted as appropriate  - Keep care items and personal belongings within reach  - Initiate and maintain comfort rounds  - Apply yellow socks and bracelet for high fall risk patients  - Consider  moving patient to room near nurses station  Outcome: Progressing  Goal: Maintain or return to baseline ADL function  Description: INTERVENTIONS:  -  Assess patient's ability to carry out ADLs; assess patient's baseline for ADL function and identify physical deficits which impact ability to perform ADLs (bathing, care of mouth/teeth, toileting, grooming, dressing, etc.)  - Assess/evaluate cause of self-care deficits   - Assess range of motion  - Assess patient's mobility; develop plan if impaired  - Assess patient's need for assistive devices and provide as appropriate  - Encourage maximum independence but intervene and supervise when necessary  - Involve family in performance of ADLs  - Assess for home care needs following discharge   - Consider OT consult to assist with ADL evaluation and planning for discharge  - Provide patient education as appropriate  Outcome: Progressing  Goal: Maintains/Returns to pre admission functional level  Description: INTERVENTIONS:  - Perform AM-PAC 6 Click Basic Mobility/ Daily Activity assessment daily.  - Set and communicate daily mobility goal to care team and patient/family/caregiver.   - Collaborate with rehabilitation services on mobility goals if consulted  - Perform Range of Motion 3 times a day.  - Out of bed for toileting  - Record patient progress and toleration of activity level   Outcome: Progressing     Problem: DISCHARGE PLANNING  Goal: Discharge to home or other facility with appropriate resources  Description: INTERVENTIONS:  - Identify barriers to discharge w/patient and caregiver  - Arrange for needed discharge resources and transportation as appropriate  - Identify discharge learning needs (meds, wound care, etc.)  - Arrange for interpretive services to assist at discharge as needed  - Refer to Case Management Department for coordinating discharge planning if the patient needs post-hospital services based on physician/advanced practitioner order or complex needs  related to functional status, cognitive ability, or social support system  Outcome: Progressing     Problem: Knowledge Deficit  Goal: Patient/family/caregiver demonstrates understanding of disease process, treatment plan, medications, and discharge instructions  Description: Complete learning assessment and assess knowledge base.  Interventions:  - Provide teaching at level of understanding  - Provide teaching via preferred learning methods  Outcome: Progressing     Problem: Nutrition/Hydration-ADULT  Goal: Nutrient/Hydration intake appropriate for improving, restoring or maintaining nutritional needs  Description: Monitor and assess patient's nutrition/hydration status for malnutrition. Collaborate with interdisciplinary team and initiate plan and interventions as ordered.  Monitor patient's weight and dietary intake as ordered or per policy. Utilize nutrition screening tool and intervene as necessary. Determine patient's food preferences and provide high-protein, high-caloric foods as appropriate.     INTERVENTIONS:  - Monitor oral intake, urinary output, labs, and treatment plans  - Assess nutrition and hydration status and recommend course of action  - Evaluate amount of meals eaten  - Assist patient with eating if necessary   - Allow adequate time for meals  - Recommend/ encourage appropriate diets, oral nutritional supplements, and vitamin/mineral supplements  - Order, calculate, and assess calorie counts as needed  - Recommend, monitor, and adjust tube feedings and TPN/PPN based on assessed needs  - Assess need for intravenous fluids  - Provide specific nutrition/hydration education as appropriate  - Include patient/family/caregiver in decisions related to nutrition  Outcome: Progressing     Problem: Prexisting or High Potential for Compromised Skin Integrity  Goal: Skin integrity is maintained or improved  Description: INTERVENTIONS:  - Identify patients at risk for skin breakdown  - Assess and monitor skin  integrity  - Assess and monitor nutrition and hydration status  - Monitor labs   - Assess for incontinence   - Turn and reposition patient  - Assist with mobility/ambulation  - Relieve pressure over bony prominences  - Avoid friction and shearing  - Provide appropriate hygiene as needed including keeping skin clean and dry  - Evaluate need for skin moisturizer/barrier cream  - Collaborate with interdisciplinary team   - Patient/family teaching  - Consider wound care consult   Outcome: Progressing

## 2024-10-09 NOTE — PLAN OF CARE
Problem: PAIN - ADULT  Goal: Verbalizes/displays adequate comfort level or baseline comfort level  Description: Interventions:  - Encourage patient to monitor pain and request assistance  - Assess pain using appropriate pain scale  - Administer analgesics based on type and severity of pain and evaluate response  - Implement non-pharmacological measures as appropriate and evaluate response  - Consider cultural and social influences on pain and pain management  - Notify physician/advanced practitioner if interventions unsuccessful or patient reports new pain  Outcome: Progressing     Problem: INFECTION - ADULT  Goal: Absence or prevention of progression during hospitalization  Description: INTERVENTIONS:  - Assess and monitor for signs and symptoms of infection  - Monitor lab/diagnostic results  - Monitor all insertion sites, i.e. indwelling lines, tubes, and drains  - Monitor endotracheal if appropriate and nasal secretions for changes in amount and color  - [place] appropriate cooling/warming therapies per order  - Administer medications as ordered  - Instruct and encourage patient and family to use good hand hygiene technique  - Identify and instruct in appropriate isolation precautions for identified infection/condition  Outcome: Progressing  Goal: Absence of fever/infection during neutropenic period  Description: INTERVENTIONS:  - Monitor WBC    Outcome: Progressing     Problem: SAFETY ADULT  Goal: Patient will remain free of falls  Description: INTERVENTIONS:  - Educate patient/family on patient safety including physical limitations  - Instruct patient to call for assistance with activity   - Consult OT/PT to assist with strengthening/mobility   - Keep Call bell within reach  - Keep bed low and locked with side rails adjusted as appropriate  - Keep care items and personal belongings within reach  - Initiate and maintain comfort rounds  - Make Fall Risk Sign visible to staff  - Offer Toileting every  Hours,  in advance of need  - Initiate/Maintain alarm  - Obtain necessary fall risk management equipment:   - Apply yellow socks and bracelet for high fall risk patients  - Consider moving patient to room near nurses station  Outcome: Progressing  Goal: Maintain or return to baseline ADL function  Description: INTERVENTIONS:  -  Assess patient's ability to carry out ADLs; assess patient's baseline for ADL function and identify physical deficits which impact ability to perform ADLs (bathing, care of mouth/teeth, toileting, grooming, dressing, etc.)  - Assess/evaluate cause of self-care deficits   - Assess range of motion  - Assess patient's mobility; develop plan if impaired  - Assess patient's need for assistive devices and provide as appropriate  - Encourage maximum independence but intervene and supervise when necessary  - Involve family in performance of ADLs  - Assess for home care needs following discharge   - Consider OT consult to assist with ADL evaluation and planning for discharge  - Provide patient education as appropriate  Outcome: Progressing  Goal: Maintains/Returns to pre admission functional level  Description: INTERVENTIONS:  - Perform AM-PAC 6 Click Basic Mobility/ Daily Activity assessment daily.  - Set and communicate daily mobility goal to care team and patient/family/caregiver.   - Collaborate with rehabilitation services on mobility goals if consulted  - Perform Range of Motion  times a day.  - Reposition patient every  hours.  - Dangle patient  times a day  - Stand patient  times a day  - Ambulate patient  times a day  - Out of bed to chair  times a day   - Out of bed for meals times a day  - Out of bed for toileting  - Record patient progress and toleration of activity level   Outcome: Progressing     Problem: DISCHARGE PLANNING  Goal: Discharge to home or other facility with appropriate resources  Description: INTERVENTIONS:  - Identify barriers to discharge w/patient and caregiver  - Arrange for  needed discharge resources and transportation as appropriate  - Identify discharge learning needs (meds, wound care, etc.)  - Arrange for interpretive services to assist at discharge as needed  - Refer to Case Management Department for coordinating discharge planning if the patient needs post-hospital services based on physician/advanced practitioner order or complex needs related to functional status, cognitive ability, or social support system  Outcome: Progressing     Problem: Knowledge Deficit  Goal: Patient/family/caregiver demonstrates understanding of disease process, treatment plan, medications, and discharge instructions  Description: Complete learning assessment and assess knowledge base.  Interventions:  - Provide teaching at level of understanding  - Provide teaching via preferred learning methods  Outcome: Progressing     Problem: Nutrition/Hydration-ADULT  Goal: Nutrient/Hydration intake appropriate for improving, restoring or maintaining nutritional needs  Description: Monitor and assess patient's nutrition/hydration status for malnutrition. Collaborate with interdisciplinary team and initiate plan and interventions as ordered.  Monitor patient's weight and dietary intake as ordered or per policy. Utilize nutrition screening tool and intervene as necessary. Determine patient's food preferences and provide high-protein, high-caloric foods as appropriate.     INTERVENTIONS:  - Monitor oral intake, urinary output, labs, and treatment plans  - Assess nutrition and hydration status and recommend course of action  - Evaluate amount of meals eaten  - Assist patient with eating if necessary   - Allow adequate time for meals  - Recommend/ encourage appropriate diets, oral nutritional supplements, and vitamin/mineral supplements  - Order, calculate, and assess calorie counts as needed  - Recommend, monitor, and adjust tube feedings and TPN/PPN based on assessed needs  - Assess need for intravenous fluids  -  Provide specific nutrition/hydration education as appropriate  - Include patient/family/caregiver in decisions related to nutrition  Outcome: Progressing     Problem: Prexisting or High Potential for Compromised Skin Integrity  Goal: Skin integrity is maintained or improved  Description: INTERVENTIONS:  - Identify patients at risk for skin breakdown  - Assess and monitor skin integrity  - Assess and monitor nutrition and hydration status  - Monitor labs   - Assess for incontinence   - Turn and reposition patient  - Assist with mobility/ambulation  - Relieve pressure over bony prominences  - Avoid friction and shearing  - Provide appropriate hygiene as needed including keeping skin clean and dry  - Evaluate need for skin moisturizer/barrier cream  - Collaborate with interdisciplinary team   - Patient/family teaching  - Consider wound care consult   Outcome: Progressing

## 2024-10-09 NOTE — ASSESSMENT & PLAN NOTE
Pt with PMHx of multiple sclerosis with neurogenic bladder and chronic SPT tube, diastolic CHF, CVA, diabetic neuropathy, hypertension, diabetes, and GERSON presented to the ED secondary to abdominal pain  Pt suprapubic cath found to be blocked with sediment. This was resolved in the ED with flushing, and patient abdominal pain subsequently improved  Met sepsis criteria possibly secondary to UTI in the setting of a blocked SPT catheter. S/p SP tube exchange 10/7/2024.     Urine culture with 30-39,000 MSSA  Currently on empiric Levaquin, will end today.

## 2024-10-09 NOTE — ASSESSMENT & PLAN NOTE
Lab Results   Component Value Date    HGBA1C 9.0 (H) 10/06/2024       Recent Labs     10/08/24  2116 10/09/24  0823 10/09/24  1146 10/09/24  1621   POCGLU 155* 140 197* 238*       Blood Sugar Average: Last 72 hrs:  (P) [id number] metformin  Well controlled on HISS

## 2024-10-09 NOTE — ASSESSMENT & PLAN NOTE
Wt Readings from Last 3 Encounters:   10/09/24 73 kg (160 lb 15 oz)   09/07/24 69.9 kg (154 lb 1.6 oz)   08/31/24 71.3 kg (157 lb 3 oz)     Pt appears euvolemic on exam  Last ECHO 10/18 EF 75% with grade 1 DD  Monitor daily weights, I/Os

## 2024-10-10 VITALS
BODY MASS INDEX: 25.9 KG/M2 | RESPIRATION RATE: 19 BRPM | OXYGEN SATURATION: 97 % | DIASTOLIC BLOOD PRESSURE: 59 MMHG | HEIGHT: 65 IN | TEMPERATURE: 97.3 F | WEIGHT: 155.42 LBS | HEART RATE: 65 BPM | SYSTOLIC BLOOD PRESSURE: 130 MMHG

## 2024-10-10 PROBLEM — N39.0 SEPSIS DUE TO URINARY TRACT INFECTION  (HCC): Status: RESOLVED | Noted: 2023-08-18 | Resolved: 2024-10-10

## 2024-10-10 PROBLEM — A41.9 SEPSIS DUE TO URINARY TRACT INFECTION  (HCC): Status: RESOLVED | Noted: 2023-08-18 | Resolved: 2024-10-10

## 2024-10-10 LAB
ANION GAP SERPL CALCULATED.3IONS-SCNC: 9 MMOL/L (ref 4–13)
BACTERIA UR CULT: ABNORMAL
BACTERIA UR CULT: ABNORMAL
BUN SERPL-MCNC: 11 MG/DL (ref 5–25)
CALCIUM SERPL-MCNC: 9.2 MG/DL (ref 8.4–10.2)
CHLORIDE SERPL-SCNC: 108 MMOL/L (ref 96–108)
CO2 SERPL-SCNC: 22 MMOL/L (ref 21–32)
CREAT SERPL-MCNC: 0.74 MG/DL (ref 0.6–1.3)
ERYTHROCYTE [DISTWIDTH] IN BLOOD BY AUTOMATED COUNT: 14.9 % (ref 11.6–15.1)
GFR SERPL CREATININE-BSD FRML MDRD: 90 ML/MIN/1.73SQ M
GLUCOSE SERPL-MCNC: 163 MG/DL (ref 65–140)
GLUCOSE SERPL-MCNC: 166 MG/DL (ref 65–140)
GLUCOSE SERPL-MCNC: 249 MG/DL (ref 65–140)
HCT VFR BLD AUTO: 41.9 % (ref 36.5–49.3)
HGB BLD-MCNC: 13.5 G/DL (ref 12–17)
MCH RBC QN AUTO: 27.4 PG (ref 26.8–34.3)
MCHC RBC AUTO-ENTMCNC: 32.2 G/DL (ref 31.4–37.4)
MCV RBC AUTO: 85 FL (ref 82–98)
PLATELET # BLD AUTO: 318 THOUSANDS/UL (ref 149–390)
PMV BLD AUTO: 8.3 FL (ref 8.9–12.7)
POTASSIUM SERPL-SCNC: 3.9 MMOL/L (ref 3.5–5.3)
RBC # BLD AUTO: 4.92 MILLION/UL (ref 3.88–5.62)
SODIUM SERPL-SCNC: 139 MMOL/L (ref 135–147)
WBC # BLD AUTO: 9.91 THOUSAND/UL (ref 4.31–10.16)

## 2024-10-10 PROCEDURE — 82948 REAGENT STRIP/BLOOD GLUCOSE: CPT

## 2024-10-10 PROCEDURE — 80048 BASIC METABOLIC PNL TOTAL CA: CPT

## 2024-10-10 PROCEDURE — 85027 COMPLETE CBC AUTOMATED: CPT

## 2024-10-10 PROCEDURE — 99239 HOSP IP/OBS DSCHRG MGMT >30: CPT | Performed by: INTERNAL MEDICINE

## 2024-10-10 RX ORDER — BISACODYL 10 MG
10 SUPPOSITORY, RECTAL RECTAL DAILY PRN
Qty: 12 SUPPOSITORY | Refills: 0 | Status: SHIPPED | OUTPATIENT
Start: 2024-10-10

## 2024-10-10 RX ORDER — AMOXICILLIN 250 MG
2 CAPSULE ORAL
Qty: 30 TABLET | Refills: 0 | Status: SHIPPED | OUTPATIENT
Start: 2024-10-10

## 2024-10-10 RX ADMIN — ACETAMINOPHEN 975 MG: 325 TABLET, FILM COATED ORAL at 05:58

## 2024-10-10 RX ADMIN — GABAPENTIN 300 MG: 300 CAPSULE ORAL at 13:14

## 2024-10-10 RX ADMIN — BACLOFEN 5 MG: 10 TABLET ORAL at 09:06

## 2024-10-10 RX ADMIN — LIDOCAINE 1 PATCH: 700 PATCH TOPICAL at 09:01

## 2024-10-10 RX ADMIN — GABAPENTIN 300 MG: 300 CAPSULE ORAL at 09:02

## 2024-10-10 RX ADMIN — PANTOPRAZOLE SODIUM 40 MG: 40 TABLET, DELAYED RELEASE ORAL at 05:58

## 2024-10-10 RX ADMIN — AMLODIPINE BESYLATE 10 MG: 10 TABLET ORAL at 09:02

## 2024-10-10 RX ADMIN — METHENAMINE HIPPURATE 1 G: 1000 TABLET ORAL at 09:02

## 2024-10-10 RX ADMIN — INSULIN LISPRO 2 UNITS: 100 INJECTION, SOLUTION INTRAVENOUS; SUBCUTANEOUS at 11:53

## 2024-10-10 RX ADMIN — PROPRANOLOL HYDROCHLORIDE 60 MG: 60 CAPSULE, EXTENDED RELEASE ORAL at 09:02

## 2024-10-10 RX ADMIN — ENOXAPARIN SODIUM 40 MG: 40 INJECTION SUBCUTANEOUS at 09:09

## 2024-10-10 RX ADMIN — ATORVASTATIN CALCIUM 80 MG: 80 TABLET, FILM COATED ORAL at 09:06

## 2024-10-10 RX ADMIN — ACETAMINOPHEN 975 MG: 325 TABLET, FILM COATED ORAL at 13:15

## 2024-10-10 RX ADMIN — INSULIN LISPRO 1 UNITS: 100 INJECTION, SOLUTION INTRAVENOUS; SUBCUTANEOUS at 09:09

## 2024-10-10 RX ADMIN — BUDESONIDE AND FORMOTEROL FUMARATE DIHYDRATE 2 PUFF: 160; 4.5 AEROSOL RESPIRATORY (INHALATION) at 09:07

## 2024-10-10 RX ADMIN — CLOPIDOGREL BISULFATE 75 MG: 75 TABLET ORAL at 09:02

## 2024-10-10 RX ADMIN — AZELASTINE 2 SPRAY: 1 SPRAY, METERED NASAL at 09:07

## 2024-10-10 NOTE — DISCHARGE SUMMARY
"Discharge Summary - Hospitalist   Name: Mathew Rico 74 y.o. male I MRN: [id number]  Unit/Bed#: E5 -01 I Date of Admission: 10/5/2024   Date of Service: 10/10/2024 I Hospital Day: 4     Assessment & Plan  Sepsis due to urinary tract infection  (HCC)  Patient is a 74-year-old male with past medical history of multiple sclerosis with neurogenic bladder and chronic SPT tube, chronic diastolic CHF, CVA, diabetic neuropathy, hypertension, diabetes, and GERSON who presented to the ED secondary to abdominal pain    Pt suprapubic cath found to be blocked with sediment. This was resolved in the ED with flushing, and patient abdominal pain subsequently improved  Met sepsis criteria most likely due to UTI, related to blocked SPT catheter.   S/p SP tube exchange 10/7/2024.   Patient's urine culture grew 30-39,000 MSSA: Due to patient's presentation for sepsis this was considered a pathogen  Patient received a 3d course of antibiotics: and improved.  Wbc improved from 20-->normal.  Afebrile.  Blood cx negative x2  SPT obstruction resolved:  functioning appropriately  Reviewed case with ID team on call:  pt improved after current tx.  Afebrile with normal wbc.  CT findings similar to prior.  30,000 culture not impressive.  As pt improved, [facility]    Neurogenic bladder  Patient has a history of multiple sclerosis with neurogenic bladder  Suprapubic tube in place: Noted to be transiently occluded on admission  Patient was evaluated by the urology team: Suprapubic tube exchanged, 10/7  Suprapubic tube functioning appropriately  Continue outpatient urology follow-up  Constipation  CT a/p demonstrating \"Fecal retention with large amount of stool within the rectal vault. There is associated wall thickening of the rectum and adjacent perirectal fatty stranding somewhat similar in appearance to the prior\"  Patient was placed on a bowel regimen with improvement  Will be discharged to continue stool " softener/laxatives  Hyperlipidemia  Continue statin  Primary hypertension  Continue amlodipine 10 mg daily  On propanolol 60 mg daily given history of essential tremor  Blood pressure adequately controlled.  Continue medications, and continue outpatient follow-up with PCP  History of CVA (cerebrovascular accident)  History of left hemisphere lacunar infarct in 2018  Continue Plavix and statin therapy  Chronic diastolic congestive heart failure (HCC)  Wt Readings from Last 3 Encounters:   10/10/24 70.5 kg (155 lb 6.8 oz)   09/07/24 69.9 kg (154 lb 1.6 oz)   08/31/24 71.3 kg (157 lb 3 oz)     Pt appears euvolemic on exam  Last ECHO 10/18 EF 75% with grade 1 DD  Patient is not on maintenance diuretics  Continue home beta-blocker, propranolol also treating his essential tremor    GERSON on CPAP  Continue cpap h.s.  Type 2 diabetes mellitus without complication, without long-term current use of insulin ([facility])  Lab Results   Component Value Date    HGBA1C 9.0 (H) 10/06/2024       Recent Labs     10/09/24  1621 10/09/24  2110 10/10/24  0750 10/10/24  1148   POCGLU 238* 248* 166* 249*       Blood Sugar Average: Last 72 hrs:  (P) 175.5  Home metformin held while hospitalized: he was placed on accuchecks and SSI  Resume home regimen, Accu-Cheks, and diabetic diet at discharge  Follow-up with PCP  Multiple sclerosis ([facility])  S/p suprapubic cath for neurogenic bladder. Bed bound at baseline.  Continue baclofen  Continue outpatient neurologic follow-up      Medical Problems       Resolved Problems  Date Reviewed: 9/3/2024   None       Discharging Physician / Practitioner: Mary Kemp MD  PCP: Carolina Kumari MD  Admission Date:   Admission Orders (From admission, onward)       Ordered        10/06/24 0044  INPATIENT ADMISSION  Once                          Discharge Date: 10/10/24    Consultations During Hospital Stay:  Urology: AGUILAR Rmairez    Procedures Performed:   10/7 SPT exchange    Significant Findings / Test Results:  "  10/8 CT abd/pelvis \"Findings above highly suspicious for cystitis in the setting of indwelling suprapubic catheter.  Large volume of stool within the rectal vault suspicious for fecal impaction and suspected stercoral colitis scratch that.  Overall these findings are somewhat similar in appearance to prior recent examinations. Clinical correlation recommended.\"    Incidental Findings:   none    Test Results Pending at Discharge (will require follow up):   Imaging:  none    Outpatient Tests Requested:  none    Complications:  none    Reason for Admission: abdominal pain, nausea    Hospital Course:   Mathew Rico is a 74 y.o. male patient who originally presented to the hospital on 10/5/2024 due to abdominal pain, nausea, and occluded suprapubic tube.  Patient suprapubic tube occlusion was unblocked in the ED.  Patient was admitted, placed on antibiotics, given bowel regimen, seen by urology for suprapubic tube exchange, and clinically improved.  He will be discharged home via ambulance to where he lives with his brother and sister      Please see above list of diagnoses and related plan for additional information.     Condition at Discharge: good    Discharge Day Visit / Exam:   Subjective: Patient relates he feels better, and is asking about being discharged home today.  He denies any pain anywhere.  He states the pain he previously had across his stomach has completely resolved.  He denies any pain from his suprapubic catheter site.  He denies any nausea or vomiting.  Is tolerating p.o.  He denies any constipation.  Relates he passed his bowels several times today.  He declines having the bowel regimen cut back as he states he would rather have multiple bowel movements and risk constipation again.  He denies any chest pain.  Denies any difficulty breathing.  Denies any other complaints.  Notes he feels back to his baseline and is eager for discharge home      Vitals: Blood Pressure: 130/59 (10/10/24 " "0902)  Pulse: 65 (10/10/24 0902)  Temperature: (!) 97.3 °F (36.3 °C) (10/10/24 0751)  Temp Source: Oral (10/10/24 0751)  Respirations: 19 (10/10/24 0751)  Height: 5' 5\" (165.1 cm) (10/07/24 1700)  Weight - Scale: 70.5 kg (155 lb 6.8 oz) (10/10/24 0548)  SpO2: 97 % (10/10/24 0751)  Physical Exam   General: Very pleasant male.  No acute distress.  Nontachypneic, nondyspneic  Heart: Regular rate and rhythm.  S1-S2 present.  No murmur, rub, gallop  Lungs: Clear to auscultation bilaterally.  No wheezes, crackles, rhonchi.  Good air movement.  No accessory muscle use or respiratory distress  Abdomen: Soft, nontender, nondistended, normoactive bowel sounds present.  No guarding or rebound.  No peritoneal signs or mass  Extremities: No clubbing, cyanosis, edema.  2+ pedal pulses bilaterally  Neurologic: Awake and alert.  Fluent and goal-directed speech.  No somnolence or lethargy  Skin: Warm and dry.  No petechiae, purpura, rash  : Suprapubic tube in place.  scant sanguinous discharge.  No purulence.  No erythema    Discussion with Family: called pts brother Niko and gave update.    Dw pts nurse  Dw :  will arrange tranportation home    Discharge instructions/Information to patient and family:   See after visit summary for information provided to patient and family.      Provisions for Follow-Up Care:  See after visit summary for information related to follow-up care and any pertinent home health orders.      Mobility at time of Discharge:   Basic Mobility Inpatient Raw Score: 6  JH-HLM Goal: 2: Bed activities/Dependent transfer  JH-HLM Achieved: 2: Bed activities/Dependent transfer  HLM Goal achieved. Continue to encourage appropriate mobility.     Disposition:   Home    Planned Readmission: na    Discharge Medications:  See after visit summary for reconciled discharge medications provided to patient and/or family.      Administrative Statements   Discharge Statement:  I have spent a total time of 45 " minutes in caring for this patient on the day of the visit/encounter. .  Reviewed radiology reports, culture results, previous discharge summary earlier this year with dizziness, UTI    **Please Note: This note may have been constructed using a voice recognition system**

## 2024-10-10 NOTE — ASSESSMENT & PLAN NOTE
Patient has a history of multiple sclerosis with neurogenic bladder  Suprapubic tube in place: Noted to be transiently occluded on admission  Patient was evaluated by the urology team: Suprapubic tube exchanged, 10/7  Suprapubic tube functioning appropriately  Continue outpatient urology follow-up

## 2024-10-10 NOTE — ASSESSMENT & PLAN NOTE
Lab Results   Component Value Date    HGBA1C 9.0 (H) 10/06/2024       Recent Labs     10/09/24  1621 10/09/24  2110 10/10/24  0750 10/10/24  1148   POCGLU 238* 248* 166* 249*       Blood Sugar Average: Last 72 hrs:  (P) 175.5  Home metformin held while hospitalized: he was placed on accuchecks and SSI  Resume home regimen, Accu-Cheks, and diabetic diet at discharge  Follow-up with PCP

## 2024-10-10 NOTE — CASE MANAGEMENT
Case Management Assessment & Discharge Planning Note    Patient name Mathew Rico  Location [facility] /E5 -* MRN [id number]  : 1950 Date 10/10/2024       Current Admission Date: 10/5/2024  Current Admission Diagnosis:Constipation   Patient Active Problem List    Diagnosis Date Noted Date Diagnosed    Pain of left hip 2024     Left leg pain 2024     Dizziness 2024     Pruritus 2024     Blurred vision, bilateral 2024     Symptoms of upper respiratory infection (URI) 06/15/2024     Chest pain 2024     Acute encephalopathy 2024     GERSON on CPAP 2024     Fall 2024     Primary hypertension 2024     Type 2 diabetes mellitus without complication, without long-term current use of insulin (HCC) 2024     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HCC) 2024     Multiple sclerosis (HCC) 2024     Urinary retention 2024     Neck pain 2024     Vitamin B deficiency 2024     Generalized weakness 2024     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     Fall 2023     Weakness 2023     Accidental fall from chair 2023     Constipation 2023     Chronic diastolic congestive heart failure (HCC) 2023     Hyponatremia 2023     Excessive daytime sleepiness 2022     Shortness of breath 2022     Pancreatic mass 2020     Pancreatic lesion 2020     Bladder stones 2019     Bladder neck contracture 2019     History of CVA (cerebrovascular accident) 10/21/2018     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HCC) 2017     Diabetic neuropathy (HCC) 2016     Neurogenic bladder 2016     Thyroid nodule 2015     Cervical spinal stenosis 2014     Generalized anxiety disorder 2014     Obstructive sleep apnea 2013     Benign colon polyp 2012     Esophageal reflux 2012     Fatty liver 2012     Glaucoma 2012     Hyperlipidemia 2012      Multiple sclerosis (HCC) 06/19/2012     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) 06/19/2012     Primary hypertension 06/11/2012       LOS (days): 4  Geometric Mean LOS (GMLOS) (days): 3.5  Days to GMLOS:-1.1     OBJECTIVE:    Risk of Unplanned Readmission Score: 48.11      Current admission status: Inpatient    Preferred Pharmacy:   [id number] [place] [facility] [address]  [address] [facility] [address]  Phone: [phone] Fax: [phone]    [place], [place] - [address]  [address] [facility] [address]  Phone: [phone] Fax: [phone]    [facility] [place] - [address]  [facility],  [address],  [address] [facility] [address]  Phone: [phone] Fax: [phone]     [facility] [place] - [address]  [address] [facility] [address]  Phone: [phone] Fax: [phone]    Primary Care Provider: Carolina Kumari MD    Primary Insurance: [facility]  Secondary Insurance:     ASSESSMENT:  Active Health Care Proxies       Guzman Rico Health Care Representative - Brother   Primary Phone: [phone] (Home)            Current Home Health Care  Home Health Agency Name:: Other ([facility])    Social Determinants of Health (SDOH)      Flowsheet Row Most Recent Value   Housing Stability    In the last 12 months, was there a time when you were not able to pay the mortgage or rent on time? N   In the past 12 months, how many times have you moved where you were living? 0   At any time in the past 12 months, were you homeless or living in a shelter (including now)? N   Transportation Needs    In the past 12 months, has lack of transportation kept you from medical appointments or from getting medications? no   In the past 12 months, has lack of transportation kept you from meetings, work, or from getting things needed for daily living? No   Food Insecurity    Within the past 12  months, you worried that your food would run out before you got the money to buy more. Never true   Within the past 12 months, the food you bought just didn't last and you didn't have money to get more. Never true   Utilities    In the past 12 months has the electric, gas, oil, or water company threatened to shut off services in your home? No     DISCHARGE DETAILS:    Discharge planning discussed with:: attempted to call pt's son, busy signal, spoke with WALE Summers     CM contacted family/caregiver?: Yes  Were Treatment Team discharge recommendations reviewed with patient/caregiver?: Yes  Did patient/caregiver verbalize understanding of patient care needs?: Yes  Were patient/caregiver advised of the risks associated with not following Treatment Team discharge recommendations?: Yes    Contacts  Patient Contacts: Guzman Rico (attempted), spoke to WALE Summers  Relationship to Patient:: Family  Contact Method: Phone  Reason/Outcome: Discharge Planning    Requested Home Health Care         Is the patient interested in HHC at discharge?: Yes  Home Health Discipline requested:: Nursing, Physical Therapy  Home Health Agency Name:: Other ([facility])  HHA External Referral Reason (only applicable if external HHA name selected): Patient has established relationship with provider  Home Health Follow-Up Provider:: PCP  Home Health Services Needed:: Evaluate Functional Status and Safety, Strengthening/Theraputic Exercises to Improve Function, Gait/ADL Training  Homebound Criteria Met:: Requires the Assistance of Another Person for Safe Ambulation or to Leave the Home, Uses an Assist Device (i.e. cane, walker, etc)  Supporting Clincal Findings:: Limited Endurance, Fatigues Easliy in Short Distances, Bed Bound or Wheelchair Bound    DME Referral Provided  Referral made for DME?: No    Treatment Team Recommendation: Home  Discharge Destination Plan:: Home  Transport at Discharge : [employer] van     Number/Name of Dispatcher:  "[employer]  Transported by (Company and Unit #): stretcher van, requested via [employer]  ETA of Transport (Date): 10/10/24  ETA of Transport (Time): 1206     Additional Comments: Cm spoke with pt's WALE as cm could not get ahold of pt's brother or sister and a continued \"busy\" signal. DC stretcher van transport requested for 4:45 pm on [employer]. CM faxed AVS to [employer] at [phone]. CM was also unable to speak with anyone when cm called back to notify of dc.    The Stretcher Van you requested for Mathew PEREZ in unit/room EAST 5  on 10/10/2024 is scheduled to arrive at 4:45pm EDT! [employer] is handling this ride and you can contact them at [phone].      "

## 2024-10-10 NOTE — ASSESSMENT & PLAN NOTE
Wt Readings from Last 3 Encounters:   10/10/24 70.5 kg (155 lb 6.8 oz)   09/07/24 69.9 kg (154 lb 1.6 oz)   08/31/24 71.3 kg (157 lb 3 oz)     Pt appears euvolemic on exam  Last ECHO 10/18 EF 75% with grade 1 DD  Patient is not on maintenance diuretics  Continue home beta-blocker, propranolol also treating his essential tremor

## 2024-10-10 NOTE — ASSESSMENT & PLAN NOTE
Patient is a 74-year-old male with past medical history of multiple sclerosis with neurogenic bladder and chronic SPT tube, chronic diastolic CHF, CVA, diabetic neuropathy, hypertension, diabetes, and GERSON who presented to the ED secondary to abdominal pain    Pt suprapubic cath found to be blocked with sediment. This was resolved in the ED with flushing, and patient abdominal pain subsequently improved  Met sepsis criteria most likely due to UTI, related to blocked SPT catheter.   S/p SP tube exchange 10/7/2024.   Patient's urine culture grew 30-39,000 MSSA: Due to patient's presentation for sepsis this was considered a pathogen  Patient received a 3d course of antibiotics: and improved.  Wbc improved from 20-->normal.  Afebrile.  Blood cx negative x2  SPT obstruction resolved:  functioning appropriately  Reviewed case with ID team on call:  pt improved after current tx.  Afebrile with normal wbc.  CT findings similar to prior.  30,000 culture not impressive.  As pt improved, no additional abx required at dc.

## 2024-10-10 NOTE — PLAN OF CARE
Problem: PAIN - ADULT  Goal: Verbalizes/displays adequate comfort level or baseline comfort level  Description: Interventions:  - Encourage patient to monitor pain and request assistance  - Assess pain using appropriate pain scale  - Administer analgesics based on type and severity of pain and evaluate response  - Implement non-pharmacological measures as appropriate and evaluate response  - Consider cultural and social influences on pain and pain management  - Notify physician/advanced practitioner if interventions unsuccessful or patient reports new pain  Outcome: Progressing     Problem: INFECTION - ADULT  Goal: Absence or prevention of progression during hospitalization  Description: INTERVENTIONS:  - Assess and monitor for signs and symptoms of infection  - Monitor lab/diagnostic results  - Monitor all insertion sites, i.e. indwelling lines, tubes, and drains  - Monitor endotracheal if appropriate and nasal secretions for changes in amount and color  - Colt appropriate cooling/warming therapies per order  - Administer medications as ordered  - Instruct and encourage patient and family to use good hand hygiene technique  - Identify and instruct in appropriate isolation precautions for identified infection/condition  Outcome: Progressing  Goal: Absence of fever/infection during neutropenic period  Description: INTERVENTIONS:  - Monitor WBC    Outcome: Progressing     Problem: SAFETY ADULT  Goal: Patient will remain free of falls  Description: INTERVENTIONS:  - Educate patient/family on patient safety including physical limitations  - Instruct patient to call for assistance with activity   - Consult OT/PT to assist with strengthening/mobility   - Keep Call bell within reach  - Keep bed low and locked with side rails adjusted as appropriate  - Keep care items and personal belongings within reach  - Initiate and maintain comfort rounds  - Make Fall Risk Sign visible to staff  - Offer Toileting every 3 Hours,  in advance of need  - Initiate/Maintain bed alarm  - Obtain necessary fall risk management equipment  - Apply yellow socks and bracelet for high fall risk patients  - Consider moving patient to room near nurses station  Outcome: Progressing  Goal: Maintain or return to baseline ADL function  Description: INTERVENTIONS:  -  Assess patient's ability to carry out ADLs; assess patient's baseline for ADL function and identify physical deficits which impact ability to perform ADLs (bathing, care of mouth/teeth, toileting, grooming, dressing, etc.)  - Assess/evaluate cause of self-care deficits   - Assess range of motion  - Assess patient's mobility; develop plan if impaired  - Assess patient's need for assistive devices and provide as appropriate  - Encourage maximum independence but intervene and supervise when necessary  - Involve family in performance of ADLs  - Assess for home care needs following discharge   - Consider OT consult to assist with ADL evaluation and planning for discharge  - Provide patient education as appropriate  Outcome: Progressing  Goal: Maintains/Returns to pre admission functional level  Description: INTERVENTIONS:  - Perform AM-PAC 6 Click Basic Mobility/ Daily Activity assessment daily.  - Set and communicate daily mobility goal to care team and patient/family/caregiver.   - Collaborate with rehabilitation services on mobility goals if consulted  - Perform Range of Motion 3 times a day.  - Reposition patient every 3 hours.  - Dangle patient 3 times a day  - Stand patient 3 times a day  - Ambulate patient 3 times a day  - Out of bed to chair 3 times a day   - Out of bed for meals 3 times a day  - Out of bed for toileting  - Record patient progress and toleration of activity level   Outcome: Progressing     Problem: DISCHARGE PLANNING  Goal: Discharge to home or other facility with appropriate resources  Description: INTERVENTIONS:  - Identify barriers to discharge w/patient and caregiver  -  Arrange for needed discharge resources and transportation as appropriate  - Identify discharge learning needs (meds, wound care, etc.)  - Arrange for interpretive services to assist at discharge as needed  - Refer to Case Management Department for coordinating discharge planning if the patient needs post-hospital services based on physician/advanced practitioner order or complex needs related to functional status, cognitive ability, or social support system  Outcome: Progressing     Problem: Knowledge Deficit  Goal: Patient/family/caregiver demonstrates understanding of disease process, treatment plan, medications, and discharge instructions  Description: Complete learning assessment and assess knowledge base.  Interventions:  - Provide teaching at level of understanding  - Provide teaching via preferred learning methods  Outcome: Progressing     Problem: Nutrition/Hydration-ADULT  Goal: Nutrient/Hydration intake appropriate for improving, restoring or maintaining nutritional needs  Description: Monitor and assess patient's nutrition/hydration status for malnutrition. Collaborate with interdisciplinary team and initiate plan and interventions as ordered.  Monitor patient's weight and dietary intake as ordered or per policy. Utilize nutrition screening tool and intervene as necessary. Determine patient's food preferences and provide high-protein, high-caloric foods as appropriate.     INTERVENTIONS:  - Monitor oral intake, urinary output, labs, and treatment plans  - Assess nutrition and hydration status and recommend course of action  - Evaluate amount of meals eaten  - Assist patient with eating if necessary   - Allow adequate time for meals  - Recommend/ encourage appropriate diets, oral nutritional supplements, and vitamin/mineral supplements  - Order, calculate, and assess calorie counts as needed  - Recommend, monitor, and adjust tube feedings and TPN/PPN based on assessed needs  - Assess need for intravenous  fluids  - Provide specific nutrition/hydration education as appropriate  - Include patient/family/caregiver in decisions related to nutrition  Outcome: Progressing     Problem: Prexisting or High Potential for Compromised Skin Integrity  Goal: Skin integrity is maintained or improved  Description: INTERVENTIONS:  - Identify patients at risk for skin breakdown  - Assess and monitor skin integrity  - Assess and monitor nutrition and hydration status  - Monitor labs   - Assess for incontinence   - Turn and reposition patient  - Assist with mobility/ambulation  - Relieve pressure over bony prominences  - Avoid friction and shearing  - Provide appropriate hygiene as needed including keeping skin clean and dry  - Evaluate need for skin moisturizer/barrier cream  - Collaborate with interdisciplinary team   - Patient/family teaching  - Consider wound care consult   Outcome: Progressing

## 2024-10-10 NOTE — PLAN OF CARE
Problem: PAIN - ADULT  Goal: Verbalizes/displays adequate comfort level or baseline comfort level  Description: Interventions:  - Encourage patient to monitor pain and request assistance  - Assess pain using appropriate pain scale  - Administer analgesics based on type and severity of pain and evaluate response  - Implement non-pharmacological measures as appropriate and evaluate response  - Consider cultural and social influences on pain and pain management  - Notify physician/advanced practitioner if interventions unsuccessful or patient reports new pain  Outcome: Progressing     Problem: INFECTION - ADULT  Goal: Absence or prevention of progression during hospitalization  Description: INTERVENTIONS:  - Assess and monitor for signs and symptoms of infection  - Monitor lab/diagnostic results  - Monitor all insertion sites, i.e. indwelling lines, tubes, and drains  - Monitor endotracheal if appropriate and nasal secretions for changes in amount and color  - [place]vel appropriate cooling/warming therapies per order  - Administer medications as ordered  - Instruct and encourage patient and family to use good hand hygiene technique  - Identify and instruct in appropriate isolation precautions for identified infection/condition  Outcome: Progressing  Goal: Absence of fever/infection during neutropenic period  Description: INTERVENTIONS:  - Monitor WBC    Outcome: Progressing     Problem: SAFETY ADULT  Goal: Patient will remain free of falls  Description: INTERVENTIONS:  - Educate patient/family on patient safety including physical limitations  - Instruct patient to call for assistance with activity   - Consult OT/PT to assist with strengthening/mobility   - Keep Call bell within reach  - Keep bed low and locked with side rails adjusted as appropriate  - Keep care items and personal belongings within reach  - Initiate and maintain comfort rounds  - Make Fall Risk Sign visible to staff  - Offer Toileting every 2 Hours,  in advance of need  - Initiate/Maintain bed alarm  - Obtain necessary fall risk management equipment: walker, alarm on  - Apply yellow socks and bracelet for high fall risk patients  - Consider moving patient to room near nurses station  Outcome: Progressing  Goal: Maintain or return to baseline ADL function  Description: INTERVENTIONS:  -  Assess patient's ability to carry out ADLs; assess patient's baseline for ADL function and identify physical deficits which impact ability to perform ADLs (bathing, care of mouth/teeth, toileting, grooming, dressing, etc.)  - Assess/evaluate cause of self-care deficits   - Assess range of motion  - Assess patient's mobility; develop plan if impaired  - Assess patient's need for assistive devices and provide as appropriate  - Encourage maximum independence but intervene and supervise when necessary  - Involve family in performance of ADLs  - Assess for home care needs following discharge   - Consider OT consult to assist with ADL evaluation and planning for discharge  - Provide patient education as appropriate  Outcome: Progressing  Goal: Maintains/Returns to pre admission functional level  Description: INTERVENTIONS:  - Perform AM-PAC 6 Click Basic Mobility/ Daily Activity assessment daily.  - Set and communicate daily mobility goal to care team and patient/family/caregiver.   - Collaborate with rehabilitation services on mobility goals if consulted  - Perform Range of Motion 3 times a day.  - Reposition patient every 2 hours.  - Dangle patient 3 times a day  - Stand patient 3 times a day  - Ambulate patient 3 times a day  - Out of bed to chair 3 times a day   - Out of bed for meals 3 times a day  - Out of bed for toileting  - Record patient progress and toleration of activity level   Outcome: Progressing     Problem: DISCHARGE PLANNING  Goal: Discharge to home or other facility with appropriate resources  Description: INTERVENTIONS:  - Identify barriers to discharge w/patient  and caregiver  - Arrange for needed discharge resources and transportation as appropriate  - Identify discharge learning needs (meds, wound care, etc.)  - Arrange for interpretive services to assist at discharge as needed  - Refer to Case Management Department for coordinating discharge planning if the patient needs post-hospital services based on physician/advanced practitioner order or complex needs related to functional status, cognitive ability, or social support system  Outcome: Progressing     Problem: Knowledge Deficit  Goal: Patient/family/caregiver demonstrates understanding of disease process, treatment plan, medications, and discharge instructions  Description: Complete learning assessment and assess knowledge base.  Interventions:  - Provide teaching at level of understanding  - Provide teaching via preferred learning methods  Outcome: Progressing     Problem: Nutrition/Hydration-ADULT  Goal: Nutrient/Hydration intake appropriate for improving, restoring or maintaining nutritional needs  Description: Monitor and assess patient's nutrition/hydration status for malnutrition. Collaborate with interdisciplinary team and initiate plan and interventions as ordered.  Monitor patient's weight and dietary intake as ordered or per policy. Utilize nutrition screening tool and intervene as necessary. Determine patient's food preferences and provide high-protein, high-caloric foods as appropriate.     INTERVENTIONS:  - Monitor oral intake, urinary output, labs, and treatment plans  - Assess nutrition and hydration status and recommend course of action  - Evaluate amount of meals eaten  - Assist patient with eating if necessary   - Allow adequate time for meals  - Recommend/ encourage appropriate diets, oral nutritional supplements, and vitamin/mineral supplements  - Order, calculate, and assess calorie counts as needed  - Recommend, monitor, and adjust tube feedings and TPN/PPN based on assessed needs  - Assess need  for intravenous fluids  - Provide specific nutrition/hydration education as appropriate  - Include patient/family/caregiver in decisions related to nutrition  Outcome: Progressing     Problem: Prexisting or High Potential for Compromised Skin Integrity  Goal: Skin integrity is maintained or improved  Description: INTERVENTIONS:  - Identify patients at risk for skin breakdown  - Assess and monitor skin integrity  - Assess and monitor nutrition and hydration status  - Monitor labs   - Assess for incontinence   - Turn and reposition patient  - Assist with mobility/ambulation  - Relieve pressure over bony prominences  - Avoid friction and shearing  - Provide appropriate hygiene as needed including keeping skin clean and dry  - Evaluate need for skin moisturizer/barrier cream  - Collaborate with interdisciplinary team   - Patient/family teaching  - Consider wound care consult   Outcome: Progressing

## 2024-10-10 NOTE — ASSESSMENT & PLAN NOTE
"CT a/p demonstrating \"Fecal retention with large amount of stool within the rectal vault. There is associated wall thickening of the rectum and adjacent perirectal fatty stranding somewhat similar in appearance to the prior\"  Patient was placed on a bowel regimen with improvement  Will be discharged to continue stool softener/laxatives  "

## 2024-10-10 NOTE — ASSESSMENT & PLAN NOTE
Continue amlodipine 10 mg daily  On propanolol 60 mg daily given history of essential tremor  Blood pressure adequately controlled.  Continue medications, and continue outpatient follow-up with PCP

## 2024-10-11 LAB
BACTERIA BLD CULT: NORMAL
BACTERIA BLD CULT: NORMAL

## 2024-10-11 NOTE — UTILIZATION REVIEW
NOTIFICATION OF ADMISSION DISCHARGE   This is a Notification of Discharge from [facility]. Please be advised that this patient has been discharge from our facility. Below you will find the admission and discharge date and time including the patient’s disposition.   UTILIZATION REVIEW CONTACT:  Marhsa Garcia  Utilization   Network Utilization Review Department  Phone: [phone] x carefully listen to the prompts. All voicemails are confidential.  Email: [email or web address]     ADMISSION INFORMATION  PRESENTATION DATE: 10/5/2024  8:11 PM  OBERVATION ADMISSION DATE: N/A  INPATIENT ADMISSION DATE: 10/6/24 12:44 AM   DISCHARGE DATE: 10/10/2024  6:16 PM   DISPOSITION:Home/Self Care    Network Utilization Review Department  ATTENTION: Please call with any questions or concerns to [phone] and carefully listen to the prompts so that you are directed to the right person. All voicemails are confidential.   For Discharge needs, contact Care Management DC Support Team at [phone] opt. [phone]  Send all requests for admission clinical reviews, approved or denied determinations and any other requests to dedicated fax number below belonging to the campus where the patient is receiving treatment. List of dedicated fax numbers for the Facilities:  FACILITY NAME UR FAX NUMBER   ADMISSION DENIALS (Administrative/Medical Necessity) [phone]   DISCHARGE SUPPORT TEAM ([facility]) [phone]   PARENT CHILD HEALTH (Maternity/NICU/Pediatrics) [phone]   [facility] [phone]   [facility] [phone]   [facility] [phone]   [facility] [phone]   [facility] [phone]   [facility] [phone]   [facility] [phone]   [facility] [phone]    [facility] [phone]   [facility] [phone]   [facility] [phone]   [facility] [phone]

## 2024-11-07 ENCOUNTER — APPOINTMENT (EMERGENCY)
Dept: CT IMAGING | Facility: HOSPITAL | Age: 74
End: 2024-11-07
Payer: MEDICARE

## 2024-11-07 ENCOUNTER — HOSPITAL ENCOUNTER (EMERGENCY)
Facility: HOSPITAL | Age: 74
Discharge: HOME | End: 2024-11-07
Attending: EMERGENCY MEDICINE | Admitting: EMERGENCY MEDICINE
Payer: MEDICARE

## 2024-11-07 ENCOUNTER — TELEPHONE (OUTPATIENT)
Dept: OTHER | Facility: HOSPITAL | Age: 74
End: 2024-11-07

## 2024-11-07 VITALS
TEMPERATURE: 98 F | HEART RATE: 104 BPM | OXYGEN SATURATION: 96 % | SYSTOLIC BLOOD PRESSURE: 140 MMHG | RESPIRATION RATE: 16 BRPM | DIASTOLIC BLOOD PRESSURE: 70 MMHG

## 2024-11-07 DIAGNOSIS — T83.010A SUPRAPUBIC CATHETER DYSFUNCTION, INITIAL ENCOUNTER (HCC): Primary | ICD-10-CM

## 2024-11-07 PROCEDURE — 99284 EMERGENCY DEPT VISIT MOD MDM: CPT

## 2024-11-07 PROCEDURE — 99284 EMERGENCY DEPT VISIT MOD MDM: CPT | Performed by: EMERGENCY MEDICINE

## 2024-11-07 PROCEDURE — 51705 CHANGE OF BLADDER TUBE: CPT | Performed by: EMERGENCY MEDICINE

## 2024-11-07 NOTE — CASE MANAGEMENT
Case Management ED Assessment & Discharge Planning Note    Patient name Mathew Rico  Location ED-15/ED-15 MRN [id number]  : 1950 Date 2024        OBJECTIVE:  Predictive Model Details          93%  Factor Value    Risk of Hospital Admission or ED Visit Model 42% Number of ED Visits 5+     24% Number of Hospitalizations 5+     9% Is in Relationship No     5% Has CVD Yes     5% Has Depression Yes     5% Has Diabetes Yes     4% Has Asthma Yes     3% Has Chronic Liver Disease Yes     3% Has CHF Yes     1% Has PCP Yes            Chief Complaint: Cage catheter problem (HCC) .  Patient Class: Emergency  Preferred Pharmacy:   [facility]/pharmacy #[id number] - [place] - [address]  [address] [address]  Phone: [phone] Fax: [phone]    [place], [place] -  [address]   [address] [facility] [address]  Phone: [phone] Fax: [phone]    [facility] [place] - [address],  [address],  First [address] [facility] [address]  Phone: [phone] Fax: [phone]     [facility] [place] [place] - [address] [place] [address]  Phone: [phone] Fax: [phone]    Primary Care Provider: Carolina Kumari MD    Primary Insurance: [facility]  Secondary Insurance:       ED Assessment:  Active Health Care Proxies       [facility] Care Representative - [facility] Phone: [phone] (Home)                     ED Discharge Details:  Transport at Discharge : Misti flores         Additional Comments: Misti flores booked for 6pm to home.

## 2024-11-07 NOTE — ED PROVIDER NOTES
Time reflects when diagnosis was documented in both MDM as applicable and the Disposition within this note       Time User Action Codes Description Comment    11/7/2024  5:11 PM Ean Vickers Add [T83.010A] Suprapubic catheter dysfunction, initial encounter ([facility])           ED Disposition       ED Disposition   Discharge    Condition   Stable    Date/Time   Thu Nov 7, 2024  5:10 PM    Comment   Mathew Rioc discharge to home/self care.                   Assessment & Plan       Medical Decision Making  Patient with history of neurogenic bladder, suprapubic catheter status, sent to the ER for suprapubic catheter insertion as unsuccessful trial at home, patient denies any symptoms, no fever, chills, abdominal pain, flank pain.  On exam, mild fresh blood clots noted on the suprapubic site, no active bleeding or oozing.  Size 20 catheter was inserted, drainage was noted after flushing.  Discussed with urology, advised flushing every 6 hours, will convey to caregiver, follow-up with urology.    Problems Addressed:  Suprapubic catheter dysfunction, initial encounter ([facility]): acute illness or injury        ED Course as of 11/07/24 1820   Thu Nov 07, 2024   1720 Bladder scan 0.  Catheter was flushed, 200 mL in, 400 out,.  Case discussed with urology, advised to flush catheter every 6 hours, patient has outpatient follow-up.       Medications - No data to display    ED Risk Strat Scores                           SBIRT 20yo+      Flowsheet Row Most Recent Value   Initial Alcohol Screen: US AUDIT-C     1. How often do you have a drink containing alcohol? 0 Filed at: 11/07/2024 1445   2. How many drinks containing alcohol do you have on a typical day you are drinking?  0 Filed at: 11/07/2024 1445   3b. FEMALE Any Age, or MALE 65+: How often do you have 4 or more drinks on one occassion? 0 Filed at: 11/07/2024 1445   Audit-C Score 0 Filed at: 11/07/2024 1445   ALPESH: How many times in the past year have you...    Used an illegal  "drug or used a prescription medication for non-medical reasons? Never Filed at: 11/07/2024 6828                            History of Present Illness       Chief Complaint   Patient presents with    Urinary Catheter Problem     Pt arrives with request for suprapubic cath placement per caregiver, as insertion today was met with resistance. Pt arrives with hand written not from caregiver stating \"Please just change catheter, it's normally a 24f but can have a 20f if needed. He doesn't need a workup. He is seeing urology next wee& will address this with them.\"        Past Medical History:   Diagnosis Date    Acute laryngitis     Acute nonsuppurative otitis media, unspecified laterality     Arm weakness     Arthritis     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HCC)     Bladder infection     Bronchitis     Constipation     Cough     Diabetes (HCC)     Diabetes mellitus (HCC)     Dizziness     Dysfunction of eustachian tube     Erectile dysfunction of non-organic origin     Fatigue     Glaucoma     Hiatal hernia     Hypertension     Imbalance     Leg muscle spasm     Leukocytosis 04/01/2024    MS (multiple sclerosis) ([facility])     Multiple sclerosis (HCC)     Nephrolithiasis     Neurogenic bladder     No natural teeth     Sinus pain     Spinal stenosis     Strain of thoracic region     Stroke ([facility])     Suprapubic catheter ([facility])       Past Surgical History:   Procedure Laterality Date    APPENDECTOMY      BRAIN SURGERY      Coil placed in aneurysm    CEREBRAL ANEURYSM REPAIR      CYSTOSCOPY      CYSTOSCOPY      CYSTOSCOPY  06/11/2018    CYSTOSCOPY  01/15/2021    EYE SURGERY      transscleral cyclophotocoagulation noncontact YAG laser    IR SUPRAPUBIC CATHETER CHECK/CHANGE/REINSERTION/UPSIZE  3/28/2024    MYRINGOTOMY      with ventilation tube insertion    NE LITHOLAPAXY SMPL/SM <2.5 CM N/A 5/7/2019    Procedure: CYSTOSCOPY, holmium laser litholapaxy of bladder stones, EXCHANGE OF SP TUBE;  Surgeon: Javy Jeffries MD;  Location: [facility];  " Service: Urology    SUPRAPUBIC CATHETER INSERTION        Family History   Problem Relation Age of Onset    Heart attack Mother     Stroke Mother     Heart attack Father     Anuerysm Father         In Stomach     Aneurysm Father       Social History     Tobacco Use    Smoking status: Former     Current packs/day: 0.00     Average packs/day: 0.5 packs/day for 31.0 years (15.5 ttl pk-yrs)     Types: Cigarettes     Start date:      Quit date:      Years since quittin.8    Smokeless tobacco: Never   Vaping Use    Vaping status: Never Used   Substance Use Topics    Alcohol use: Not Currently    Drug use: No      E-Cigarette/Vaping    E-Cigarette Use Never User       E-Cigarette/Vaping Substances    Nicotine No     THC No     CBD No     Flavoring No     Other No     Unknown No       I have reviewed and agree with the history as documented.       History provided by:  Patient   used: No    Urinary Catheter Problem  Quality:  DIfficulty changing Suprapubic cathter at home  Severity:  Moderate  Onset quality:  Gradual  Duration:  2 hours  Timing:  Intermittent  Progression:  Waxing and waning  Chronicity:  New  Context:  Patient with suprapubic catheter status, difficulty changing at home, could not get catheter in, sent to ER for suprapubic catheter insertion  Relieved by:  Nothing  Worsened by:  Nothing  Ineffective treatments:  None  Associated symptoms: no abdominal pain, no chest pain, no cough, no diarrhea, no fever, no headaches, no nausea, no rash, no shortness of breath, no sore throat and no vomiting    Risk factors:  Neurogenic bladder, Suprapubic catheter status      Review of Systems   Constitutional:  Negative for chills and fever.   HENT:  Negative for facial swelling, sore throat and trouble swallowing.    Eyes:  Negative for pain and visual disturbance.   Respiratory:  Negative for cough, chest tightness and shortness of breath.    Cardiovascular:  Negative for chest pain and  leg swelling.   Gastrointestinal:  Negative for abdominal pain, diarrhea, nausea and vomiting.   Genitourinary:  Negative for dysuria and flank pain.   Musculoskeletal:  Negative for back pain, neck pain and neck stiffness.   Skin:  Negative for pallor and rash.   Allergic/Immunologic: Negative for environmental allergies and immunocompromised state.   Neurological:  Negative for dizziness and headaches.   Hematological:  Negative for adenopathy. Does not bruise/bleed easily.   Psychiatric/Behavioral:  Negative for agitation and behavioral problems.    All other systems reviewed and are negative.          Objective       ED Triage Vitals [11/07/24 1442]   Temperature Pulse Blood Pressure Respirations SpO2 Patient Position - Orthostatic VS   98 °F (36.7 °C) 100 152/65 16 96 % Lying      Temp Source Heart Rate Source BP Location FiO2 (%) Pain Score    Oral -- Left arm -- No Pain      Vitals      Date and Time Temp Pulse SpO2 Resp BP Pain Score FACES Pain Rating User   11/07/24 1811 -- 104 96 % 16 140/70 -- -- NG   11/07/24 1545 -- 104 96 % -- 142/71 -- -- JT   11/07/24 1442 98 °F (36.7 °C) 100 96 % 16 152/65 No Pain -- NG            Physical Exam  Vitals and nursing note reviewed.   Constitutional:       General: He is not in acute distress.     Appearance: He is well-developed.   HENT:      Head: Normocephalic and atraumatic.   Eyes:      Extraocular Movements: Extraocular movements intact.   Cardiovascular:      Rate and Rhythm: Normal rate and regular rhythm.   Pulmonary:      Effort: Pulmonary effort is normal.   Abdominal:      Palpations: Abdomen is soft.      Tenderness: There is no abdominal tenderness. There is no guarding or rebound.      Comments: Suprapubic catheter site with mild fresh blood clot, no active oozing/bleeding   Musculoskeletal:         General: Normal range of motion.      Cervical back: Normal range of motion and neck supple.   Skin:     General: Skin is warm and dry.   Neurological:       General: No focal deficit present.      Mental Status: He is alert and oriented to person, place, and time.   Psychiatric:         Mood and Affect: Mood normal.         Behavior: Behavior normal.         Results Reviewed       None            No orders to display       Suprapubic catheter insertion    Date/Time: 11/7/2024 6:16 PM    Performed by: Ean Vickers MD  Authorized by: Ean Vickers MD    Patient location:  ED  Assisting Provider(s): No    Consent:     Consent obtained:  Verbal    Risks discussed:  Pain and bleeding    Alternatives discussed:  No treatment  Universal protocol:     Procedure explained and questions answered to patient or proxy's satisfaction: yes      Patient identity confirmed:  Verbally with patient  Indications:     Indications:  Neurogenic bladder  Pre-procedure details:     Skin preparation:  Betadine  Anesthesia (see MAR for exact dosages):     Anesthesia method:  None  Procedure Detail:     Equipment used:  Cage catheter size 20    Procedure note (site, laterality, method, findings):  Suprapubic catheter site was cleaned with Betadine, surgical lube was applied, size 20 Cage catheter inserted without difficulty, balloon inflated with 5 mL as recommended on the Cage packaging, small amount of urine drained in the line.  Post-procedure details:     Patient tolerance of procedure:  Tolerated well, no immediate complications  Comments:      Catheter was flushed, drainage was noted.      ED Medication and Procedure Management   Prior to Admission Medications   Prescriptions Last Dose Informant Patient Reported? Taking?   Empagliflozin (JARDIANCE) 10 MG TABS tablet  Outside Facility (Specify) Yes No   Sig: Take 10 mg by mouth every morning   Ergocalciferol (VITAMIN D2 PO)  Outside Facility (Specify) Yes No   Sig: Take 50,000 Units by mouth once a week   acetaminophen (TYLENOL) 325 mg tablet  Outside Facility (Specify) No No   Sig: Take 2 tablets (650 mg total) by mouth every 6 (six)  hours as needed for mild pain   albuterol (2.5 mg/3 mL) 0.083 % nebulizer solution  Outside Facility (Specify) No No   Sig: Take 3 mL (2.5 mg total) by nebulization every 6 (six) hours as needed for wheezing or shortness of breath   amLODIPine-atorvastatin (CADUET) 10-80 MG per tablet  Outside Facility (Specify) Yes No   Sig: Take 1 tablet by mouth daily   baclofen 10 mg tablet  Outside Facility (Specify) Yes No   Sig: Take 5 mg by mouth 3 (three) times a day   bisacodyl (DULCOLAX) 10 mg suppository   No No   Sig: Insert 1 suppository (10 mg total) into the rectum daily as needed for constipation for up to 12 doses   budesonide-formoterol (SYMBICORT) 160-4.5 mcg/act inhaler  Outside Facility (Specify) Yes No   Sig: Inhale 2 puffs 2 (two) times a day Rinse mouth after use.   clopidogrel (PLAVIX) 75 mg tablet  Outside Facility (Specify) No No   Sig: Take 1 tablet (75 mg total) by mouth daily   cromolyn (NASALCHROM) 5.2 MG/ACT nasal spray   No No   Si spray into each nostril 3 (three) times a day   cyanocobalamin (VITAMIN B-12) 500 MCG tablet  Outside Facility (Specify) No No   Sig: Take 1 tablet (500 mcg total) by mouth daily   gabapentin (NEURONTIN) 300 mg capsule  Outside Facility (Specify) No No   Sig: Take 1 capsule (300 mg total) by mouth 2 (two) times a day   gabapentin (NEURONTIN) 300 mg capsule  Outside Facility (Specify) No No   Sig: Take 2 capsules (600 mg total) by mouth daily at bedtime   latanoprost (XALATAN) 0.005 % ophthalmic solution  Outside Facility (Specify) Yes No   Sig: Administer 1 drop to both eyes daily at bedtime   lidocaine (LIDODERM) 5 %  Outside Facility (Specify) No No   Sig: Apply 1 patch topically over 12 hours daily Remove & Discard patch within 12 hours or as directed by MD   meclizine (ANTIVERT) 12.5 MG tablet   No No   Sig: Take 1 tablet (12.5 mg total) by mouth every 8 (eight) hours as needed for dizziness   melatonin 3 mg  Outside Facility (Specify) Yes No   Sig: Take 3 mg by  mouth daily at bedtime as needed   metFORMIN (GLUCOPHAGE) 1000 MG tablet  Outside Facility (Specify) Yes No   Sig: Take 1,000 mg by mouth 2 (two) times a day with meals   methenamine hippurate (HIPREX) 1 g tablet  Outside Facility (Specify) No No   Sig: Take 1 tablet (1 g total) by mouth 2 (two) times a day with meals   mirtazapine (REMERON) 7.5 MG tablet  Outside Facility (Specify) Yes No   Sig: Take 7.5 mg by mouth daily at bedtime   pantoprazole (PROTONIX) 40 mg tablet  Outside Facility (Specify) Yes No   Sig: Take 40 mg by mouth daily   polyethylene glycol (MIRALAX) 17 g packet   No No   Sig: Take 17 g by mouth 2 (two) times a day   propranolol (INDERAL LA) 60 mg 24 hr capsule  Outside Facility (Specify) Yes No   Sig: Take 60 mg by mouth daily   senna-docusate sodium (SENOKOT S) 8.6-50 mg per tablet   No No   Sig: Take 2 tablets by mouth daily at bedtime      Facility-Administered Medications: None     Discharge Medication List as of 11/7/2024  5:11 PM        CONTINUE these medications which have NOT CHANGED    Details   acetaminophen (TYLENOL) 325 mg tablet Take 2 tablets (650 mg total) by mouth every 6 (six) hours as needed for mild pain, Starting Fri 3/29/2024, No Print      albuterol (2.5 mg/3 mL) 0.083 % nebulizer solution Take 3 mL (2.5 mg total) by nebulization every 6 (six) hours as needed for wheezing or shortness of breath, Starting Tue 10/3/2023, Normal      amLODIPine-atorvastatin (CADUET) 10-80 MG per tablet Take 1 tablet by mouth daily, Historical Med      baclofen 10 mg tablet Take 5 mg by mouth 3 (three) times a day, Historical Med      bisacodyl (DULCOLAX) 10 mg suppository Insert 1 suppository (10 mg total) into the rectum daily as needed for constipation for up to 12 doses, Starting Thu 10/10/2024, Normal      budesonide-formoterol (SYMBICORT) 160-4.5 mcg/act inhaler Inhale 2 puffs 2 (two) times a day Rinse mouth after use., Historical Med      clopidogrel (PLAVIX) 75 mg tablet Take 1 tablet  (75 mg total) by mouth daily, Starting Sat 10/27/2018, No Print      cromolyn (NASALCHROM) 5.2 MG/ACT nasal spray 1 spray into each nostril 3 (three) times a day, Starting Tue 6/18/2024, Normal      cyanocobalamin (VITAMIN B-12) 500 MCG tablet Take 1 tablet (500 mcg total) by mouth daily, Starting Tue 4/2/2024, No Print      Empagliflozin (JARDIANCE) 10 MG TABS tablet Take 10 mg by mouth every morning, Historical Med      Ergocalciferol (VITAMIN D2 PO) Take 50,000 Units by mouth once a week, Historical Med      !! gabapentin (NEURONTIN) 300 mg capsule Take 1 capsule (300 mg total) by mouth 2 (two) times a day, Starting Thu 6/3/2021, Normal      !! gabapentin (NEURONTIN) 300 mg capsule Take 2 capsules (600 mg total) by mouth daily at bedtime, Starting Thu 6/3/2021, Normal      latanoprost (XALATAN) 0.005 % ophthalmic solution Administer 1 drop to both eyes daily at bedtime, Historical Med      lidocaine (LIDODERM) 5 % Apply 1 patch topically over 12 hours daily Remove & Discard patch within 12 hours or as directed by MD, Starting Wed 4/3/2024, No Print      meclizine (ANTIVERT) 12.5 MG tablet Take 1 tablet (12.5 mg total) by mouth every 8 (eight) hours as needed for dizziness, Starting Sun 7/28/2024, Normal      melatonin 3 mg Take 3 mg by mouth daily at bedtime as needed, Historical Med      metFORMIN (GLUCOPHAGE) 1000 MG tablet Take 1,000 mg by mouth 2 (two) times a day with meals, Historical Med      methenamine hippurate (HIPREX) 1 g tablet Take 1 tablet (1 g total) by mouth 2 (two) times a day with meals, Starting Wed 3/27/2024, Normal      mirtazapine (REMERON) 7.5 MG tablet Take 7.5 mg by mouth daily at bedtime, Historical Med      pantoprazole (PROTONIX) 40 mg tablet Take 40 mg by mouth daily, Historical Med      polyethylene glycol (MIRALAX) 17 g packet Take 17 g by mouth 2 (two) times a day, Starting Tue 6/18/2024, Normal      propranolol (INDERAL LA) 60 mg 24 hr capsule Take 60 mg by mouth daily,  Historical Med      senna-docusate sodium (SENOKOT S) 8.6-50 mg per tablet Take 2 tablets by mouth daily at bedtime, Starting Thu 10/10/2024, Normal       !! - Potential duplicate medications found. Please discuss with provider.        No discharge procedures on file.  ED SEPSIS DOCUMENTATION   Time reflects when diagnosis was documented in both MDM as applicable and the Disposition within this note       Time User Action Codes Description Comment    11/7/2024  5:11 PM Ean Vickers Add [T83.010A] Suprapubic catheter dysfunction, initial encounter (HCC)                  Ean Vickers MD  11/07/24 9814

## 2024-11-07 NOTE — ED NOTES
Patient has minimal output since insertion of superpubic cath.  notified. Nurse flushed 100ml of fluid on first attempt. Return was 200 with description of yellow and clots On second flush of 100 ml and got output of 200 ml with clear return.  notified.      Brandon Meyers RN  11/07/24 1645

## 2024-11-08 NOTE — TELEPHONE ENCOUNTER
Patient is seeing Vinod on 11/12 @ 11:00 - Patient resides at [facility].  He was recently in hospital and had spt exchange - however unable to get 24 f in and replaced with a 22 f or 20f  - both documented in different spots (11/7/24)

## 2024-11-08 NOTE — TELEPHONE ENCOUNTER
Patient was in the emergency department for malfunctioning suprapubic tube.  Had suprapubic changed 11/7.  They were unable to replace 24 Iranian but placed a 20 Iranian.  Please follow-up with patient to ensure there is no further difficulty with his suprapubic tube.  Patient has follow-up already scheduled for 11/12.  Is appropriate to keep this appointment if he is not having any issues

## 2024-11-11 ENCOUNTER — TELEPHONE (OUTPATIENT)
Dept: UROLOGY | Facility: CLINIC | Age: 74
End: 2024-11-11

## 2024-11-11 NOTE — TELEPHONE ENCOUNTER
Called and spoke with patient's RN at [facility]. Informed about SPT exchange in the office as requested by them. [place] office is the normal office for patient and closest for facility. Scheduled in about 3 weeks around holiday and when a provider will be in the office to assist if need be. Patient is non-mobile. Informed he will need to come on a stretcher then. RN verbalized understanding.

## 2024-11-11 NOTE — TELEPHONE ENCOUNTER
GYPSY Back MD; Tara Cheung RN; Vinod Falk PA-C; Irma Richardson RN  He has an appointment with you guewa tomorrow.    Thanks        Previous Messages       ----- Message -----  From: Javy Jeffries MD  Sent: 11/9/2024   7:48 AM EST  To: Tara Cheung RN; Irma Richardson, RN; *  Subject: RE: Suprapubic Catheter                          Thank you Latrell Solis, can you make an RN visit for Dieudonne q3 weeks to change the SPT in the office and flush.    Thanks.    FT  ----- Message -----  From: Tara Cheung RN  Sent: 11/8/2024   8:18 AM EST  To: Javy Jeffries MD; Vinod Falk PA-C  Subject: Suprapubic Catheter                              Hello,    Just wanted to touch base on Dieudonne. I am his nurse with [employer].  I went to the home yesterday to change his catheter, but got resistance.  I tried a 24f and a 20f.  He has been having a lot of sediment.  Last week the sediment had it blocked. He was in a lot of pain when I got there. His abdomen was distended.  I flush the catheter and unblocked it and then 1300cc came out. Yesterday the catheter site was very bloody with clots and greenish discharge. We had to send him to the ER to have another catheter placed.  Moving forward we need a better plan for him and his catheter changes.  There are too many issues.  Is it possible to have it done in the office monthly or IR?    Thanks,    Tara Cheung RN

## 2024-11-12 ENCOUNTER — OFFICE VISIT (OUTPATIENT)
Dept: UROLOGY | Facility: AMBULATORY SURGERY CENTER | Age: 74
End: 2024-11-12
Payer: MEDICARE

## 2024-11-12 VITALS
SYSTOLIC BLOOD PRESSURE: 138 MMHG | RESPIRATION RATE: 20 BRPM | HEART RATE: 104 BPM | DIASTOLIC BLOOD PRESSURE: 68 MMHG | OXYGEN SATURATION: 98 %

## 2024-11-12 DIAGNOSIS — N31.9 NEUROGENIC BLADDER: Primary | ICD-10-CM

## 2024-11-12 PROCEDURE — 99213 OFFICE O/P EST LOW 20 MIN: CPT

## 2024-11-12 NOTE — ASSESSMENT & PLAN NOTE
History of multiple sclerosis with neurogenic bladder managed with chronic suprapubic catheter for over 10+ years  Patient has recently been experiencing more difficulty with SPT blockages and a malfunctioning suprapubic tube  Patient underwent suprapubic tube exchange in the emergency department on 11/7/2024 with a new 20 Mongolian SPT placed at that time  Patient's SPT was easily flushed in the office today.  We discussed that for future suprapubic tube exchanges the patient should be seen in the office to undergo these exchanges.  Patient notes that he would prefer it this way as there has been too many complications at his assisted living facility with the exchanges.  Patient will be scheduled for a nursing visit to undergo routine SPT exchanges every 3 to 4 weeks

## 2024-11-12 NOTE — PROGRESS NOTES
11/12/2024      Assessment and Plan    74 y.o. male managed by Dr. Jeffries    Neurogenic bladder  History of multiple sclerosis with neurogenic bladder managed with chronic suprapubic catheter for over 10+ years  Patient has recently been experiencing more difficulty with SPT blockages and a malfunctioning suprapubic tube  Patient underwent suprapubic tube exchange in the emergency department on 11/7/2024 with a new 20 Chilean SPT placed at that time  Patient's SPT was easily flushed in the office today.  We discussed that for future suprapubic tube exchanges the patient should be seen in the office to undergo these exchanges.  Patient notes that he would prefer it this way as there has been too many complications at his assisted living facility with the exchanges.  Patient will be scheduled for a nursing visit to undergo routine SPT exchanges every 3 to 4 weeks        History of Present Illness  Mathew Rico is a 74 y.o. male here for evaluation of neurogenic bladder with chronic urinary retention managed with suprapubic catheter for his than a decade, history of bladder stones status post cystolitholapaxy performed in 2014 and 2019, and chronic bacteriuria.  Patient was recently seen in the emergency department 11/7/2024 for a malfunctioning suprapubic tube.  Patient was unable to have a the 24 Chilean suprapubic tube exchanged, but a new 20 Chilean SP tube was placed at that time.  Patient presented in the office today and has been over doing well after his most recent SPT exchange in the emergency department.  Patient notes that there has been frequent issues with SPT exchanges as well as blockages occurring within the suprapubic tube.  Patient would prefer to have his suprapubic tubes exchanged at our office rather than with his caregivers at his assisted living facility.  Otherwise, the patient is overall doing well today.      Review of Systems   Constitutional:  Negative for chills and fever.   HENT:   Negative for ear pain and sore throat.    Eyes:  Negative for pain and visual disturbance.   Respiratory:  Negative for cough and shortness of breath.    Cardiovascular:  Negative for chest pain and palpitations.   Gastrointestinal:  Negative for abdominal pain and vomiting.   Genitourinary:  Negative for decreased urine volume, difficulty urinating, dysuria, flank pain, frequency, hematuria and urgency.   Musculoskeletal:  Negative for arthralgias and back pain.   Skin:  Negative for color change and rash.   Neurological:  Negative for seizures and syncope.   All other systems reviewed and are negative.               Vitals  Vitals:    11/12/24 1056   BP: 138/68   BP Location: Right arm   Pulse: 104   Resp: 20   SpO2: 98%       Physical Exam  Vitals reviewed.   Constitutional:       General: He is not in acute distress.     Appearance: Normal appearance. He is not ill-appearing.   HENT:      Head: Normocephalic and atraumatic.      Nose: Nose normal.   Eyes:      General: No scleral icterus.  Pulmonary:      Effort: No respiratory distress.   Abdominal:      General: Abdomen is flat. There is no distension.      Palpations: Abdomen is soft.      Tenderness: There is no abdominal tenderness.   Genitourinary:     Comments: 20 Icelandic suprapubic tube in place and intact draining clear yellow urine to bag  Musculoskeletal:         General: Normal range of motion.      Cervical back: Normal range of motion.   Skin:     General: Skin is warm.      Coloration: Skin is not jaundiced.   Neurological:      Mental Status: He is alert and oriented to person, place, and time.      Gait: Gait normal.   Psychiatric:         Mood and Affect: Mood normal.         Behavior: Behavior normal.           Past History  Past Medical History:   Diagnosis Date    Acute laryngitis     Acute nonsuppurative otitis media, unspecified laterality     Arm weakness     Arthritis     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HCC)     Bladder infection     Bronchitis      Constipation     Cough     Diabetes (HCC)     Diabetes mellitus (HCC)     Dizziness     Dysfunction of eustachian tube     Erectile dysfunction of non-organic origin     Fatigue     Glaucoma     Hiatal hernia     Hypertension     Imbalance     Leg muscle spasm     Leukocytosis 2024    MS (multiple sclerosis) (HCC)     Multiple sclerosis (HCC)     Nephrolithiasis     Neurogenic bladder     No natural teeth     Sinus pain     Spinal stenosis     Strain of thoracic region     Stroke (HCC)     Suprapubic catheter ([facility])      Social History     Socioeconomic History    Marital status: Single     Spouse name: None    Number of children: None    Years of education: None    Highest education level: None   Occupational History    Occupation:    retired   Tobacco Use    Smoking status: Former     Current packs/day: 0.00     Average packs/day: 0.5 packs/day for 31.0 years (15.5 ttl pk-yrs)     Types: Cigarettes     Start date:      Quit date:      Years since quittin.8    Smokeless tobacco: Never   Vaping Use    Vaping status: Never Used   Substance and Sexual Activity    Alcohol use: Not Currently    Drug use: No    Sexual activity: Not Currently   Other Topics Concern    None   Social History Narrative    ** Merged History Encounter **          Social Determinants of Health     Financial Resource Strain: Not on file   Food Insecurity: No Food Insecurity (10/7/2024)    Nursing - Inadequate Food Risk Classification     Worried About Running Out of Food in the Last Year: Never true     Ran Out of Food in the Last Year: Never true     Ran Out of Food in the Last Year: Not on file   Transportation Needs: No Transportation Needs (10/7/2024)    PRAPARE - Transportation     Lack of Transportation (Medical): No     Lack of Transportation (Non-Medical): No   Physical Activity: Not on file   Stress: Not on file   Social Connections: Not on file   Intimate Partner Violence: Not on file   Housing  "Stability: Low Risk  (10/7/2024)    Housing Stability Vital Sign     Unable to Pay for Housing in the Last Year: No     Number of Times Moved in the Last Year: 0     Homeless in the Last Year: No     Social History     Tobacco Use   Smoking Status Former    Current packs/day: 0.00    Average packs/day: 0.5 packs/day for 31.0 years (15.5 ttl pk-yrs)    Types: Cigarettes    Start date:     Quit date:     Years since quittin.8   Smokeless Tobacco Never     Family History   Problem Relation Age of Onset    Heart attack Mother     Stroke Mother     Heart attack Father     Anuerysm Father         In Stomach     Aneurysm Father        The following portions of the patient's history were reviewed and updated as appropriate: allergies, current medications, past medical history, past social history, past surgical history and problem list.    Results  No results found for this or any previous visit (from the past 1 hour(s)).]  No results found for: \"PSA\"  Lab Results   Component Value Date    GLUCOSE 127 2024    CALCIUM 9.2 10/10/2024     2016    K 3.9 10/10/2024    CO2 22 10/10/2024     10/10/2024    BUN 11 10/10/2024    CREATININE 0.74 10/10/2024     Lab Results   Component Value Date    WBC 9.91 10/10/2024    HGB 13.5 10/10/2024    HCT 41.9 10/10/2024    MCV 85 10/10/2024     10/10/2024      "

## 2024-11-22 ENCOUNTER — HOSPITAL ENCOUNTER (EMERGENCY)
Facility: HOSPITAL | Age: 74
Discharge: HOME | End: 2024-11-22
Attending: EMERGENCY MEDICINE
Payer: MEDICARE

## 2024-11-22 VITALS
SYSTOLIC BLOOD PRESSURE: 138 MMHG | TEMPERATURE: 97.8 F | BODY MASS INDEX: 25.46 KG/M2 | HEART RATE: 93 BPM | DIASTOLIC BLOOD PRESSURE: 63 MMHG | OXYGEN SATURATION: 95 % | RESPIRATION RATE: 15 BRPM | WEIGHT: 153 LBS

## 2024-11-22 DIAGNOSIS — T83.9XXA FOLEY CATHETER PROBLEM, INITIAL ENCOUNTER (HCC): Primary | ICD-10-CM

## 2024-11-22 DIAGNOSIS — E86.0 DEHYDRATION: ICD-10-CM

## 2024-11-22 DIAGNOSIS — B34.9 VIRAL ILLNESS: ICD-10-CM

## 2024-11-22 DIAGNOSIS — R42 DIZZINESS: ICD-10-CM

## 2024-11-22 LAB
ANION GAP SERPL CALCULATED.3IONS-SCNC: 7 MMOL/L (ref 4–13)
ATRIAL RATE: 95 BPM
BASOPHILS # BLD AUTO: 0.13 THOUSANDS/ÂΜL (ref 0–0.1)
BASOPHILS NFR BLD AUTO: 1 % (ref 0–1)
BUN SERPL-MCNC: 14 MG/DL (ref 5–25)
CALCIUM SERPL-MCNC: 10.3 MG/DL (ref 8.4–10.2)
CHLORIDE SERPL-SCNC: 104 MMOL/L (ref 96–108)
CO2 SERPL-SCNC: 26 MMOL/L (ref 21–32)
CREAT SERPL-MCNC: 0.93 MG/DL (ref 0.6–1.3)
EOSINOPHIL # BLD AUTO: 0.85 THOUSAND/ÂΜL (ref 0–0.61)
EOSINOPHIL NFR BLD AUTO: 5 % (ref 0–6)
ERYTHROCYTE [DISTWIDTH] IN BLOOD BY AUTOMATED COUNT: 16 % (ref 11.6–15.1)
GFR SERPL CREATININE-BSD FRML MDRD: 80 ML/MIN/1.73SQ M
GLUCOSE SERPL-MCNC: 120 MG/DL (ref 65–140)
HCT VFR BLD AUTO: 50.4 % (ref 36.5–49.3)
HGB BLD-MCNC: 16.1 G/DL (ref 12–17)
IMM GRANULOCYTES # BLD AUTO: 0.09 THOUSAND/UL (ref 0–0.2)
IMM GRANULOCYTES NFR BLD AUTO: 1 % (ref 0–2)
LYMPHOCYTES # BLD AUTO: 3.1 THOUSANDS/ÂΜL (ref 0.6–4.47)
LYMPHOCYTES NFR BLD AUTO: 17 % (ref 14–44)
MCH RBC QN AUTO: 27.1 PG (ref 26.8–34.3)
MCHC RBC AUTO-ENTMCNC: 31.9 G/DL (ref 31.4–37.4)
MCV RBC AUTO: 85 FL (ref 82–98)
MONOCYTES # BLD AUTO: 1.3 THOUSAND/ÂΜL (ref 0.17–1.22)
MONOCYTES NFR BLD AUTO: 7 % (ref 4–12)
NEUTROPHILS # BLD AUTO: 12.97 THOUSANDS/ÂΜL (ref 1.85–7.62)
NEUTS SEG NFR BLD AUTO: 69 % (ref 43–75)
NRBC BLD AUTO-RTO: 0 /100 WBCS
P AXIS: 66 DEGREES
PLATELET # BLD AUTO: 356 THOUSANDS/UL (ref 149–390)
PMV BLD AUTO: 8.3 FL (ref 8.9–12.7)
POTASSIUM SERPL-SCNC: 3.9 MMOL/L (ref 3.5–5.3)
PR INTERVAL: 176 MS
QRS AXIS: 63 DEGREES
QRSD INTERVAL: 90 MS
QT INTERVAL: 374 MS
QTC INTERVAL: 469 MS
RBC # BLD AUTO: 5.94 MILLION/UL (ref 3.88–5.62)
SODIUM SERPL-SCNC: 137 MMOL/L (ref 135–147)
T WAVE AXIS: 44 DEGREES
VENTRICULAR RATE: 95 BPM
WBC # BLD AUTO: 18.44 THOUSAND/UL (ref 4.31–10.16)

## 2024-11-22 PROCEDURE — 96375 TX/PRO/DX INJ NEW DRUG ADDON: CPT

## 2024-11-22 PROCEDURE — 93005 ELECTROCARDIOGRAM TRACING: CPT

## 2024-11-22 PROCEDURE — 99284 EMERGENCY DEPT VISIT MOD MDM: CPT | Performed by: EMERGENCY MEDICINE

## 2024-11-22 PROCEDURE — 96366 THER/PROPH/DIAG IV INF ADDON: CPT

## 2024-11-22 PROCEDURE — 76775 US EXAM ABDO BACK WALL LIM: CPT | Performed by: EMERGENCY MEDICINE

## 2024-11-22 PROCEDURE — 85025 COMPLETE CBC W/AUTO DIFF WBC: CPT

## 2024-11-22 PROCEDURE — 99284 EMERGENCY DEPT VISIT MOD MDM: CPT

## 2024-11-22 PROCEDURE — 36415 COLL VENOUS BLD VENIPUNCTURE: CPT

## 2024-11-22 PROCEDURE — 80048 BASIC METABOLIC PNL TOTAL CA: CPT

## 2024-11-22 PROCEDURE — 93010 ELECTROCARDIOGRAM REPORT: CPT | Performed by: INTERNAL MEDICINE

## 2024-11-22 PROCEDURE — 96365 THER/PROPH/DIAG IV INF INIT: CPT

## 2024-11-22 RX ORDER — ONDANSETRON 2 MG/ML
4 INJECTION INTRAMUSCULAR; INTRAVENOUS ONCE
Status: COMPLETED | OUTPATIENT
Start: 2024-11-22 | End: 2024-11-22

## 2024-11-22 RX ORDER — SODIUM CHLORIDE, SODIUM GLUCONATE, SODIUM ACETATE, POTASSIUM CHLORIDE, MAGNESIUM CHLORIDE, SODIUM PHOSPHATE, DIBASIC, AND POTASSIUM PHOSPHATE .53; .5; .37; .037; .03; .012; .00082 G/100ML; G/100ML; G/100ML; G/100ML; G/100ML; G/100ML; G/100ML
500 INJECTION, SOLUTION INTRAVENOUS ONCE
Status: COMPLETED | OUTPATIENT
Start: 2024-11-22 | End: 2024-11-22

## 2024-11-22 RX ADMIN — ONDANSETRON 4 MG: 2 INJECTION INTRAMUSCULAR; INTRAVENOUS at 07:56

## 2024-11-22 RX ADMIN — SODIUM CHLORIDE, SODIUM GLUCONATE, SODIUM ACETATE, POTASSIUM CHLORIDE, MAGNESIUM CHLORIDE, SODIUM PHOSPHATE, DIBASIC, AND POTASSIUM PHOSPHATE 500 ML: .53; .5; .37; .037; .03; .012; .00082 INJECTION, SOLUTION INTRAVENOUS at 07:55

## 2024-11-22 NOTE — ED PROCEDURE NOTE
Procedure  POC Renal US    Date/Time: 11/22/2024 7:58 AM    Performed by: Trell Worley MD  Authorized by: Trell Worley MD    Patient location:  ED  Procedure details:     Exam Type:  Diagnostic    Indications comment:  Dela Cruz problem    Views obtained: bladder (transverse and sagittal)      Image quality: diagnostic      Image availability:  Images available in PACS  Findings:     Bladder:  Visualized    Bladder findings: distended bladder and dela cruz within bladder      Bladder findings comment:  Heterogeneous material floating around Dela Cruz and in the dependent area of the bladder  Interpretation:     Renal ultrasound impressions: normal exam                     Trell Worley MD  11/22/24 0759

## 2024-11-22 NOTE — ED PROVIDER NOTES
Time reflects when diagnosis was documented in both MDM as applicable and the Disposition within this note       Time User Action Codes Description Comment    11/22/2024  8:44 AM Trell Worley [T83.9XXA] Cage catheter problem, initial encounter (HCC)     11/22/2024  8:44 AM Trell Worley Chantelle [R42] Dizziness     11/22/2024  8:44 AM Trell Worley Chantelle [B34.9] Viral illness     11/22/2024  8:44 AM Trell Worley Chantelle [E86.0] Dehydration           ED Disposition       ED Disposition   Discharge    Condition   Stable    Date/Time   Fri Nov 22, 2024  8:44 AM    Comment   Mathew Rico discharge to home/self care.                   Assessment & Plan       Medical Decision Making  Likely suprapubic catheter displacement versus pain of site versus occluded catheter versus infection.  Low likelihood for infection given no other symptoms.  Will obtain a bladder ultrasound to look for location of Cage balloon.  For dizziness will obtain basic labs and give fluids as well as Zofran.  Looking for any electrolyte abnormalities signs of infection or anemia.  Doubt vertigo or neurologic symptoms as he has a unchanged baseline neuroexam no nystagmus.    Discussed with patient findings of the labs and EKG.  He says he feels better after his Cage catheter was able to drain his bladder.  Will discharge at this time with strict return precautions especially given the elevated white blood cell count.  Patient instructed to follow-up with his PCP.    Amount and/or Complexity of Data Reviewed  External Data Reviewed: labs, radiology, ECG and notes.  Labs: ordered. Decision-making details documented in ED Course.  ECG/medicine tests: ordered and independent interpretation performed. Decision-making details documented in ED Course.    Risk  Prescription drug management.        ED Course as of 11/22/24 0854   Fri Nov 22, 2024   0750 ECG 12 lead  Compared to October 5, 2024, sinus rhythm normal axis, normal intervals, normal EKG unchanged  from prior.   0801 Ultrasound bladder shows free-floating Cage catheter balloon.  Will have it flushed and look for return.  If no return we will consider exchange.   0814 WBC(!): 18.44  Pt has viral type symptoms no other concerning symptoms at this time will investigate further but will have patient have strict return precautions       Medications   multi-electrolyte (ISOLYTE-S PH 7.4) bolus 500 mL (500 mL Intravenous New Bag 11/22/24 0755)   ondansetron (ZOFRAN) injection 4 mg (4 mg Intravenous Given 11/22/24 0756)       ED Risk Strat Scores                                               History of Present Illness       Chief Complaint   Patient presents with    Urinary Catheter Problem     Patient reports waking up this morning with pain from suprapubic catheter with blood around insertion site    Dizziness     Patient reports dizzy spells when moving for the past few days. +nausea       Past Medical History:   Diagnosis Date    Acute laryngitis     Acute nonsuppurative otitis media, unspecified laterality     Arm weakness     Arthritis     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HCC)     Bladder infection     Bronchitis     Constipation     Cough     Diabetes (HCC)     Diabetes mellitus (HCC)     Dizziness     Dysfunction of eustachian tube     Erectile dysfunction of non-organic origin     Fatigue     Glaucoma     Hiatal hernia     Hypertension     Imbalance     Leg muscle spasm     Leukocytosis 04/01/2024    MS (multiple sclerosis) (HCC)     Multiple sclerosis (HCC)     Nephrolithiasis     Neurogenic bladder     No natural teeth     Sinus pain     Spinal stenosis     Strain of thoracic region     Stroke (HCC)     Suprapubic catheter ([facility])       Past Surgical History:   Procedure Laterality Date    APPENDECTOMY      BRAIN SURGERY      Coil placed in aneurysm    CEREBRAL ANEURYSM REPAIR      CYSTOSCOPY      CYSTOSCOPY      CYSTOSCOPY  06/11/2018    CYSTOSCOPY  01/15/2021    EYE SURGERY      transscleral cyclophotocoagulation  noncontact YAG laser    IR SUPRAPUBIC CATHETER CHECK/CHANGE/REINSERTION/UPSIZE  3/28/2024    MYRINGOTOMY      with ventilation tube insertion    OH LITHOLAPAXY SMPL/SM <2.5 CM N/A 2019    Procedure: CYSTOSCOPY, holmium laser litholapaxy of bladder stones, EXCHANGE OF SP TUBE;  Surgeon: Javy Jeffries MD;  Location: [facility];  Service: Urology    SUPRAPUBIC CATHETER INSERTION        Family History   Problem Relation Age of Onset    Heart attack Mother     Stroke Mother     Heart attack Father     Anuerysm Father         In Stomach     Aneurysm Father       Social History     Tobacco Use    Smoking status: Former     Current packs/day: 0.00     Average packs/day: 0.5 packs/day for 31.0 years (15.5 ttl pk-yrs)     Types: Cigarettes     Start date:      Quit date:      Years since quittin.9    Smokeless tobacco: Never   Vaping Use    Vaping status: Never Used   Substance Use Topics    Alcohol use: Not Currently    Drug use: No      E-Cigarette/Vaping    E-Cigarette Use Never User       E-Cigarette/Vaping Substances    Nicotine No     THC No     CBD No     Flavoring No     Other No     Unknown No       I have reviewed and agree with the history as documented.     74-year-old past medical history of headaches, MS, chronic suprapubic catheter secondary to neurogenic bladder, diabetes, hypertension coming in for primarily pain around the suprapubic catheter site.  Additionally has a complaint of dizziness with nausea.  Concerning the pain around the catheter site he says started this morning.  He says that he is not sure if he pulled it out partially so he is not sure if it is draining.  He says he has no abdominal pain just pain around the site.  He states that he has had no trauma to the area.  He did recently have it switched out on .  He has a 20 British catheter in.  He denies any other symptoms including fever, chills, pain around his scrotum or penis.  He says that he has chronic  constipation which is unchanged.  He states that concerning his dizziness he says has been going on for a few days and he says that he feels like maybe it happens when he moves but he is not sure.  He says associated with some nausea.  He states that he has been eating and drinking denies any headache chest pain shortness of breath vomiting or diarrhea.  States that he has not really had this before does not feel like an MS flare or towards a migraine.        Review of Systems        Objective       ED Triage Vitals   Temperature Pulse Blood Pressure Respirations SpO2 Patient Position - Orthostatic VS   11/22/24 0718 11/22/24 0718 11/22/24 0718 11/22/24 0718 11/22/24 0718 11/22/24 0718   97.8 °F (36.6 °C) 93 (!) 177/85 17 97 % Lying      Temp Source Heart Rate Source BP Location FiO2 (%) Pain Score    11/22/24 0718 11/22/24 0718 11/22/24 0718 -- 11/22/24 0719    Oral Monitor Right arm  5      Vitals      Date and Time Temp Pulse SpO2 Resp BP Pain Score FACES Pain Rating User   11/22/24 0719 -- -- -- -- -- 5 -- KG   11/22/24 0718 97.8 °F (36.6 °C) 93 97 % 17 177/85 -- -- KG            Physical Exam  Vitals and nursing note reviewed.   Constitutional:       Appearance: Normal appearance. He is well-developed.   HENT:      Head: Normocephalic and atraumatic.      Nose: Nose normal.      Mouth/Throat:      Mouth: Mucous membranes are dry.      Pharynx: No oropharyngeal exudate or posterior oropharyngeal erythema.   Eyes:      Extraocular Movements: Extraocular movements intact.      Conjunctiva/sclera: Conjunctivae normal.      Pupils: Pupils are equal, round, and reactive to light.      Comments: No nystagmus   Cardiovascular:      Rate and Rhythm: Normal rate and regular rhythm.      Pulses: Normal pulses.      Heart sounds: Normal heart sounds.   Pulmonary:      Effort: Pulmonary effort is normal.      Breath sounds: Normal breath sounds.   Abdominal:      General: Bowel sounds are normal. There is no distension.       Palpations: Abdomen is soft.      Tenderness: There is no abdominal tenderness. There is no guarding or rebound.      Comments: Suprapubic cath site clean dry and intact with.  Cage catheter freely moves on manipulation.  Mild granulation tissue seen but no bleeding.  No pain on manipulation.   Musculoskeletal:         General: Normal range of motion.      Cervical back: Normal range of motion and neck supple.      Right lower leg: No edema.      Left lower leg: No edema.   Skin:     General: Skin is warm and dry.      Capillary Refill: Capillary refill takes less than 2 seconds.      Coloration: Skin is pale.   Neurological:      General: No focal deficit present.      Mental Status: He is alert and oriented to person, place, and time. Mental status is at baseline.      Cranial Nerves: No cranial nerve deficit.   Psychiatric:         Mood and Affect: Mood normal.         Behavior: Behavior normal.         Results Reviewed       Procedure Component Value Units Date/Time    Basic metabolic panel [[id number]]  (Abnormal) Collected: 11/22/24 0755    Lab Status: Final result Specimen: Blood from Arm, Right Updated: 11/22/24 0827     Sodium 137 mmol/L      Potassium 3.9 mmol/L      Chloride 104 mmol/L      CO2 26 mmol/L      ANION GAP 7 mmol/L      BUN 14 mg/dL      Creatinine 0.93 mg/dL      Glucose 120 mg/dL      Calcium 10.3 mg/dL      eGFR 80 ml/min/1.73sq m     Narrative:      National Kidney Disease Foundation guidelines for Chronic Kidney Disease (CKD):     Stage 1 with normal or high GFR (GFR > 90 mL/min/1.73 square meters)    Stage 2 Mild CKD (GFR = 60-89 mL/min/1.73 square meters)    Stage 3A Moderate CKD (GFR = 45-59 mL/min/1.73 square meters)    Stage 3B Moderate CKD (GFR = 30-44 mL/min/1.73 square meters)    Stage 4 Severe CKD (GFR = 15-29 mL/min/1.73 square meters)    Stage 5 End Stage CKD (GFR <15 mL/min/1.73 square meters)  Note: GFR calculation is accurate only with a steady state creatinine    CBC and  differential [[id number]]  (Abnormal) Collected: 11/22/24 0755    Lab Status: Final result Specimen: Blood from Arm, Right Updated: 11/22/24 0811     WBC 18.44 Thousand/uL      RBC 5.94 Million/uL      Hemoglobin 16.1 g/dL      Hematocrit 50.4 %      MCV 85 fL      MCH 27.1 pg      MCHC 31.9 g/dL      RDW 16.0 %      MPV 8.3 fL      Platelets 356 Thousands/uL      nRBC 0 /100 WBCs      Segmented % 69 %      Immature Grans % 1 %      Lymphocytes % 17 %      Monocytes % 7 %      Eosinophils Relative 5 %      Basophils Relative 1 %      Absolute Neutrophils 12.97 Thousands/µL      Absolute Immature Grans 0.09 Thousand/uL      Absolute Lymphocytes 3.10 Thousands/µL      Absolute Monocytes 1.30 Thousand/µL      Eosinophils Absolute 0.85 Thousand/µL      Basophils Absolute 0.13 Thousands/µL             No orders to display       Procedures    ED Medication and Procedure Management   Prior to Admission Medications   Prescriptions Last Dose Informant Patient Reported? Taking?   Empagliflozin (JARDIANCE) 10 MG TABS tablet  Outside Facility (Specify) Yes No   Sig: Take 10 mg by mouth every morning   Ergocalciferol (VITAMIN D2 PO)  Outside Facility (Specify) Yes No   Sig: Take 50,000 Units by mouth once a week   acetaminophen (TYLENOL) 325 mg tablet  Outside Facility (Specify) No No   Sig: Take 2 tablets (650 mg total) by mouth every 6 (six) hours as needed for mild pain   albuterol (2.5 mg/3 mL) 0.083 % nebulizer solution  Outside Facility (Specify) No No   Sig: Take 3 mL (2.5 mg total) by nebulization every 6 (six) hours as needed for wheezing or shortness of breath   amLODIPine-atorvastatin (CADUET) 10-80 MG per tablet  Outside Facility (Specify) Yes No   Sig: Take 1 tablet by mouth daily   baclofen 10 mg tablet  Outside Facility (Specify) Yes No   Sig: Take 5 mg by mouth 3 (three) times a day   bisacodyl (DULCOLAX) 10 mg suppository  Outside Facility (Specify) No No   Sig: Insert 1 suppository (10 mg total) into the rectum  daily as needed for constipation for up to 12 doses   budesonide-formoterol (SYMBICORT) 160-4.5 mcg/act inhaler  Outside Facility (Specify) Yes No   Sig: Inhale 2 puffs 2 (two) times a day Rinse mouth after use.   clopidogrel (PLAVIX) 75 mg tablet  Outside Facility (Specify) No No   Sig: Take 1 tablet (75 mg total) by mouth daily   cromolyn (NASALCHROM) 5.2 MG/ACT nasal spray  Outside Facility (Specify) No No   Si spray into each nostril 3 (three) times a day   cyanocobalamin (VITAMIN B-12) 500 MCG tablet  Outside Facility (Specify) No No   Sig: Take 1 tablet (500 mcg total) by mouth daily   gabapentin (NEURONTIN) 300 mg capsule  Outside Facility (Specify) No No   Sig: Take 1 capsule (300 mg total) by mouth 2 (two) times a day   gabapentin (NEURONTIN) 300 mg capsule  Outside Facility (Specify) No No   Sig: Take 2 capsules (600 mg total) by mouth daily at bedtime   latanoprost (XALATAN) 0.005 % ophthalmic solution  Outside Facility (Specify) Yes No   Sig: Administer 1 drop to both eyes daily at bedtime   lidocaine (LIDODERM) 5 %  Outside Facility (Specify) No No   Sig: Apply 1 patch topically over 12 hours daily Remove & Discard patch within 12 hours or as directed by MD   meclizine (ANTIVERT) 12.5 MG tablet  Outside Facility (Specify) No No   Sig: Take 1 tablet (12.5 mg total) by mouth every 8 (eight) hours as needed for dizziness   melatonin 3 mg  Outside Facility (Specify) Yes No   Sig: Take 3 mg by mouth daily at bedtime as needed   metFORMIN (GLUCOPHAGE) 1000 MG tablet  Outside Facility (Specify) Yes No   Sig: Take 1,000 mg by mouth 2 (two) times a day with meals   methenamine hippurate (HIPREX) 1 g tablet  Outside Facility (Specify) No No   Sig: Take 1 tablet (1 g total) by mouth 2 (two) times a day with meals   mirtazapine (REMERON) 7.5 MG tablet  Outside Facility (Specify) Yes No   Sig: Take 7.5 mg by mouth daily at bedtime   pantoprazole (PROTONIX) 40 mg tablet  Outside Facility (Specify) Yes No   Sig:  Take 40 mg by mouth daily   polyethylene glycol (MIRALAX) 17 g packet  Outside Facility (Specify) No No   Sig: Take 17 g by mouth 2 (two) times a day   propranolol (INDERAL LA) 60 mg 24 hr capsule  Outside Facility (Specify) Yes No   Sig: Take 60 mg by mouth daily   senna-docusate sodium (SENOKOT S) 8.6-50 mg per tablet  Outside Facility (Specify) No No   Sig: Take 2 tablets by mouth daily at bedtime      Facility-Administered Medications: None     Patient's Medications   Discharge Prescriptions    No medications on file     No discharge procedures on file.  ED SEPSIS DOCUMENTATION   Time reflects when diagnosis was documented in both MDM as applicable and the Disposition within this note       Time User Action Codes Description Comment    11/22/2024  8:44 AM Trell Worley [T83.9XXA] Cage catheter problem, initial encounter (HCC)     11/22/2024  8:44 AM Trell Worley [R42] Dizziness     11/22/2024  8:44 AM Trell Worley [B34.9] Viral illness     11/22/2024  8:44 AM Trell Worley [E86.0] Dehydration                  Trell Worley MD  11/22/24 0860

## 2024-11-22 NOTE — DISCHARGE INSTRUCTIONS
Your labs were reassuring concerning your dizziness.  You did have an elevated white count however this can be seen with viral illnesses.  Given your story and clinical appearance we believe it is a viral illness.  However if you start develop a fever or worsening symptoms of nausea, vomiting please return to the emergency department or follow-up with your PCP.    Concerning the abdominal pain is likely secondary to urinary retention because your Cage catheter was clogged.  Please make sure that you are flushing at home if it becomes clogged so that you do not retain urine.

## 2024-11-22 NOTE — ED NOTES
Pt discharged by previous shift.  Pt transported home via stretcher van.  Pt reports family at home to open door and pt has discharge instructions.     Beatrice Weaver RN  11/22/24 0854

## 2024-11-22 NOTE — ED ATTENDING ATTESTATION
11/22/2024  I, Donna Reyes DO, saw and evaluated the patient. I have discussed the patient with the resident/non-physician practitioner and agree with the resident's/non-physician practitioner's findings, Plan of Care, and MDM as documented in the resident's/non-physician practitioner's note, except where noted. All available labs and Radiology studies were reviewed.  I was present for key portions of any procedure(s) performed by the resident/non-physician practitioner and I was immediately available to provide assistance.       At this point I agree with the current assessment done in the Emergency Department.  I have conducted an independent evaluation of this patient a history and physical is as follows:    ED Course     74 y.o. M w/h/o MS neurogenic bladder s/p suprapubic catheter p/w pain at suprapubic catheter site x this AM and dizziness x 2 days.  Pt coming from home. Pt reports he mostly came in due to his catheter issue.  Unsure if it is draining. Denies F/C, abd pain, N/V/D/C.  Also reports some dizziness that he feels is more lightheadedness.  Seems to come on more with movement.  Denies HA, CP, SOB, abd pain, cough.  On exam, pt in NAD.  Heart RRR. Lungs CTAB. Abd soft NT/ND.  Suprapubic insertion site without erythema. Plan:  RN flushed suprapubic cath and cath spontaneously started to drain.  Will send CBC to r/o anemia, BMP to r/o electrolyte abnormalities/ARMANDO, EKG to r/o arrhythmia, and give IV fluids.        Critical Care Time  Procedures

## 2024-12-03 ENCOUNTER — TELEPHONE (OUTPATIENT)
Age: 74
End: 2024-12-03

## 2024-12-03 NOTE — TELEPHONE ENCOUNTER
"Tara from [employer] called today to question why the patient's 12/6 appointment was rescheduled to 12/10. I informed the caller that it says \"provider out of office\" and she would like confirmation.    Please review.    Call back [phone] ext [phone]  "

## 2024-12-04 ENCOUNTER — HOSPITAL ENCOUNTER (EMERGENCY)
Facility: HOSPITAL | Age: 74
Discharge: HOME | End: 2024-12-04
Payer: MEDICARE

## 2024-12-04 VITALS
DIASTOLIC BLOOD PRESSURE: 88 MMHG | BODY MASS INDEX: 26.93 KG/M2 | HEART RATE: 86 BPM | OXYGEN SATURATION: 97 % | WEIGHT: 161.82 LBS | RESPIRATION RATE: 18 BRPM | SYSTOLIC BLOOD PRESSURE: 200 MMHG | TEMPERATURE: 98.1 F

## 2024-12-04 DIAGNOSIS — E11.40 DIABETIC NEUROPATHY (HCC): Primary | ICD-10-CM

## 2024-12-04 DIAGNOSIS — I10 ASYMPTOMATIC HYPERTENSION: ICD-10-CM

## 2024-12-04 DIAGNOSIS — G35 MULTIPLE SCLEROSIS (HCC): ICD-10-CM

## 2024-12-04 PROCEDURE — 99283 EMERGENCY DEPT VISIT LOW MDM: CPT

## 2024-12-04 PROCEDURE — 99284 EMERGENCY DEPT VISIT MOD MDM: CPT

## 2024-12-04 RX ORDER — ACETAMINOPHEN 325 MG/1
975 TABLET ORAL ONCE
Status: COMPLETED | OUTPATIENT
Start: 2024-12-04 | End: 2024-12-04

## 2024-12-04 RX ORDER — GABAPENTIN 300 MG/1
600 CAPSULE ORAL ONCE
Status: COMPLETED | OUTPATIENT
Start: 2024-12-04 | End: 2024-12-04

## 2024-12-04 RX ORDER — GABAPENTIN 300 MG/1
300 CAPSULE ORAL 3 TIMES DAILY
Qty: 30 CAPSULE | Refills: 0 | Status: ON HOLD | OUTPATIENT
Start: 2024-12-04

## 2024-12-04 RX ORDER — GABAPENTIN 300 MG/1
600 CAPSULE ORAL
Qty: 30 CAPSULE | Refills: 0 | Status: ON HOLD | OUTPATIENT
Start: 2024-12-04

## 2024-12-04 RX ADMIN — ACETAMINOPHEN 975 MG: 325 TABLET, FILM COATED ORAL at 00:44

## 2024-12-04 RX ADMIN — GABAPENTIN 600 MG: 300 CAPSULE ORAL at 00:44

## 2024-12-04 NOTE — DISCHARGE INSTRUCTIONS
Your evaluation suggests that your symptoms are due to a non emergent cause, most consistent with diabetic neuropathy.    Please follow up with your primary care physician within one week. Discuss your blood pressure which was high here.    I increased your gabapentin dose. Please continue taking as prescribed.    Return to the Emergency Department if you experience worsening or concerning symptoms.    Thank you for choosing us for your care.

## 2024-12-04 NOTE — ED PROVIDER NOTES
Time reflects when diagnosis was documented in both MDM as applicable and the Disposition within this note       Time User Action Codes Description Comment    12/4/2024  1:26 AM Brendon Pepper Add [E11.40] Diabetic neuropathy (HCC)     12/4/2024  1:27 AM Brendon Pepper Add [G35] Multiple sclerosis ([facility])     12/4/2024  1:29 AM Brendon Pepper Add [I10] Asymptomatic hypertension           ED Disposition       ED Disposition   Discharge    Condition   Stable    Date/Time   Wed Dec 4, 2024  1:26 AM    Comment   Mathew Rico discharge to home/self care.                   Assessment & Plan       Medical Decision Making  Patient with history as below presented with foot pain. History obtained from patient.    Differential diagnosis includes: diabetic neuropathy, muscular pain    Plan: Gabapentin, Tylenol    Patient was treated with below with improvement in symptoms. Reassessed the patient and they continue to be well appearing. Presentation most consistent with nonemergent neuropathic pain in the setting of diabetes.  Increased home dosage of gabapentin.  Instructed to follow-up with primary care physician regarding symptoms. Stable for outpatient management.    Disposition: Discharged with instructions to obtain outpatient follow up of patient's symptoms and findings, with strict return precautions if patient develops new or worsening symptoms. Patient understands this plan and is agreeable. All questions answered. Patient discharged home with return precautions.    Risk  OTC drugs.  Prescription drug management.             Medications   gabapentin (NEURONTIN) capsule 600 mg (600 mg Oral Given 12/4/24 0044)   acetaminophen (TYLENOL) tablet 975 mg (975 mg Oral Given 12/4/24 0044)       ED Risk Strat Scores                           SBIRT 22yo+      Flowsheet Row Most Recent Value   Initial Alcohol Screen: US AUDIT-C     1. How often do you have a drink containing alcohol? 0 Filed at: 12/04/2024 0031   2. How many  "drinks containing alcohol do you have on a typical day you are drinking?  0 Filed at: 12/04/2024 0031   3b. FEMALE Any Age, or MALE 65+: How often do you have 4 or more drinks on one occassion? 0 Filed at: 12/04/2024 0031   Audit-C Score 0 Filed at: 12/04/2024 0031   ALPESH: How many times in the past year have you...    Used an illegal drug or used a prescription medication for non-medical reasons? Never Filed at: 12/04/2024 0031                            History of Present Illness       Chief Complaint   Patient presents with    Leg Pain     Pt arriving via ems from home c/o bilateral \"severe\" leg pain that started a couple days ago and describes it as \"nerve pain\". Pt has hx diabetes and is bed bound.       Past Medical History:   Diagnosis Date    Acute laryngitis     Acute nonsuppurative otitis media, unspecified laterality     Arm weakness     Arthritis     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HCC)     Bladder infection     Bronchitis     Constipation     Cough     Diabetes (HCC)     Diabetes mellitus (HCC)     Dizziness     Dysfunction of eustachian tube     Erectile dysfunction of non-organic origin     Fatigue     Glaucoma     Hiatal hernia     Hypertension     Imbalance     Leg muscle spasm     Leukocytosis 04/01/2024    MS (multiple sclerosis) (HCC)     Multiple sclerosis (HCC)     Nephrolithiasis     Neurogenic bladder     No natural teeth     Sinus pain     Spinal stenosis     Strain of thoracic region     Stroke ([facility])     Suprapubic catheter ([facility])       Past Surgical History:   Procedure Laterality Date    APPENDECTOMY      BRAIN SURGERY      Coil placed in aneurysm    CEREBRAL ANEURYSM REPAIR      CYSTOSCOPY      CYSTOSCOPY      CYSTOSCOPY  06/11/2018    CYSTOSCOPY  01/15/2021    EYE SURGERY      transscleral cyclophotocoagulation noncontact YAG laser    IR SUPRAPUBIC CATHETER CHECK/CHANGE/REINSERTION/UPSIZE  3/28/2024    MYRINGOTOMY      with ventilation tube insertion    NM LITHOLAPAXY SMPL/SM <2.5 CM N/A " 2019    Procedure: CYSTOSCOPY, holmium laser litholapaxy of bladder stones, EXCHANGE OF SP TUBE;  Surgeon: Javy Jeffries MD;  Location: [facility];  Service: Urology    SUPRAPUBIC CATHETER INSERTION        Family History   Problem Relation Age of Onset    Heart attack Mother     Stroke Mother     Heart attack Father     Anuerysm Father         In Stomach     Aneurysm Father       Social History     Tobacco Use    Smoking status: Former     Current packs/day: 0.00     Average packs/day: 0.5 packs/day for 31.0 years (15.5 ttl pk-yrs)     Types: Cigarettes     Start date:      Quit date:      Years since quittin.9    Smokeless tobacco: Never   Vaping Use    Vaping status: Never Used   Substance Use Topics    Alcohol use: Not Currently    Drug use: No      E-Cigarette/Vaping    E-Cigarette Use Never User       E-Cigarette/Vaping Substances    Nicotine No     THC No     CBD No     Flavoring No     Other No     Unknown No       I have reviewed and agree with the history as documented.     Patient is a 74-year-old male with a significant past medical history of diabetes, diabetic neuropathy, multiple sclerosis, neurogenic bladder with chronic indwelling suprapubic catheter, presenting for evaluation of bilateral leg pain.  Patient reports a sharp sensation in his bilateral feet that radiates up his legs.  He says that this feels exactly the same as previous episodes of diabetic neuropathy.  He does take gabapentin and has not had any changes in dosage over the last several years.  He denies any change in temperature to the feet.  He denies any injuries to the area.  He denies any back pain.  He is otherwise without complaint.        Review of Systems   Neurological:         Neuropathic pain           Objective       ED Triage Vitals [24 0030]   Temperature Pulse Blood Pressure Respirations SpO2 Patient Position - Orthostatic VS   98.1 °F (36.7 °C) 86 (!) 200/88 18 97 % Lying      Temp Source Heart  Rate Source BP Location FiO2 (%) Pain Score    Oral Monitor Right arm -- 5      Vitals      Date and Time Temp Pulse SpO2 Resp BP Pain Score FACES Pain Rating User   12/04/24 0030 98.1 °F (36.7 °C) 86 97 % 18 200/88 5 -- SL            Physical Exam  Vitals and nursing note reviewed.   Constitutional:       General: He is not in acute distress.     Appearance: Normal appearance. He is not ill-appearing or toxic-appearing.   HENT:      Head: Normocephalic and atraumatic.      Right Ear: External ear normal.      Left Ear: External ear normal.      Nose: Nose normal.   Eyes:      General: No scleral icterus.        Right eye: No discharge.         Left eye: No discharge.      Extraocular Movements: Extraocular movements intact.      Conjunctiva/sclera: Conjunctivae normal.   Cardiovascular:      Rate and Rhythm: Normal rate.      Heart sounds: Normal heart sounds. No murmur heard.     No friction rub. No gallop.   Pulmonary:      Effort: Pulmonary effort is normal. No respiratory distress.      Breath sounds: Normal breath sounds.   Abdominal:      General: Abdomen is flat. There is no distension.      Palpations: Abdomen is soft. There is no mass.      Tenderness: There is no abdominal tenderness.   Genitourinary:     Comments: Deferred  Musculoskeletal:      Comments: Tenderness to palpation over the bilateral feet diffusely.  Up into the bilateral calfs.  No evidence of trauma.  No skin changes.  No ulcers.  Normal temperature.  DP/PT pulses 2+/4.  Full range of motion with full strength.  Compartment surrounding are soft.   Skin:     General: Skin is warm and dry.   Neurological:      General: No focal deficit present.      Mental Status: He is alert.         Results Reviewed       None            No orders to display       Procedures    ED Medication and Procedure Management   Prior to Admission Medications   Prescriptions Last Dose Informant Patient Reported? Taking?   Empagliflozin (JARDIANCE) 10 MG TABS tablet   Outside Facility (Specify) Yes No   Sig: Take 10 mg by mouth every morning   Ergocalciferol (VITAMIN D2 PO)  Outside Facility (Specify) Yes No   Sig: Take 50,000 Units by mouth once a week   acetaminophen (TYLENOL) 325 mg tablet  Outside Facility (Specify) No No   Sig: Take 2 tablets (650 mg total) by mouth every 6 (six) hours as needed for mild pain   albuterol (2.5 mg/3 mL) 0.083 % nebulizer solution  Outside Facility (Specify) No No   Sig: Take 3 mL (2.5 mg total) by nebulization every 6 (six) hours as needed for wheezing or shortness of breath   amLODIPine-atorvastatin (CADUET) 10-80 MG per tablet  Outside Facility (Specify) Yes No   Sig: Take 1 tablet by mouth daily   baclofen 10 mg tablet  Outside Facility (Specify) Yes No   Sig: Take 5 mg by mouth 3 (three) times a day   bisacodyl (DULCOLAX) 10 mg suppository  Outside Facility (Specify) No No   Sig: Insert 1 suppository (10 mg total) into the rectum daily as needed for constipation for up to 12 doses   budesonide-formoterol (SYMBICORT) 160-4.5 mcg/act inhaler  Outside Facility (Specify) Yes No   Sig: Inhale 2 puffs 2 (two) times a day Rinse mouth after use.   clopidogrel (PLAVIX) 75 mg tablet  Outside Facility (Specify) No No   Sig: Take 1 tablet (75 mg total) by mouth daily   cromolyn (NASALCHROM) 5.2 MG/ACT nasal spray  Outside Facility (Specify) No No   Si spray into each nostril 3 (three) times a day   cyanocobalamin (VITAMIN B-12) 500 MCG tablet  Outside Facility (Specify) No No   Sig: Take 1 tablet (500 mcg total) by mouth daily   gabapentin (NEURONTIN) 300 mg capsule  Outside Facility (Specify) No No   Sig: Take 1 capsule (300 mg total) by mouth 2 (two) times a day   gabapentin (NEURONTIN) 300 mg capsule  Outside Facility (Specify) No No   Sig: Take 2 capsules (600 mg total) by mouth daily at bedtime   gabapentin (NEURONTIN) 300 mg capsule   No Yes   Sig: Take 1 capsule (300 mg total) by mouth 3 (three) times a day   gabapentin (NEURONTIN) 300 mg  capsule   No Yes   Sig: Take 2 capsules (600 mg total) by mouth daily at bedtime   latanoprost (XALATAN) 0.005 % ophthalmic solution  Outside Facility (Specify) Yes No   Sig: Administer 1 drop to both eyes daily at bedtime   lidocaine (LIDODERM) 5 %  Outside Facility (Specify) No No   Sig: Apply 1 patch topically over 12 hours daily Remove & Discard patch within 12 hours or as directed by MD charlesne (ANTIVERT) 12.5 MG tablet  Outside Facility (Specify) No No   Sig: Take 1 tablet (12.5 mg total) by mouth every 8 (eight) hours as needed for dizziness   melatonin 3 mg  Outside Facility (Specify) Yes No   Sig: Take 3 mg by mouth daily at bedtime as needed   metFORMIN (GLUCOPHAGE) 1000 MG tablet  Outside Facility (Specify) Yes No   Sig: Take 1,000 mg by mouth 2 (two) times a day with meals   methenamine hippurate (HIPREX) 1 g tablet  Outside Facility (Specify) No No   Sig: Take 1 tablet (1 g total) by mouth 2 (two) times a day with meals   mirtazapine (REMERON) 7.5 MG tablet  Outside Facility (Specify) Yes No   Sig: Take 7.5 mg by mouth daily at bedtime   pantoprazole (PROTONIX) 40 mg tablet  Outside Facility (Specify) Yes No   Sig: Take 40 mg by mouth daily   polyethylene glycol (MIRALAX) 17 g packet  Outside Facility (Specify) No No   Sig: Take 17 g by mouth 2 (two) times a day   propranolol (INDERAL LA) 60 mg 24 hr capsule  Outside Facility (Specify) Yes No   Sig: Take 60 mg by mouth daily   senna-docusate sodium (SENOKOT S) 8.6-50 mg per tablet  Outside Facility (Specify) No No   Sig: Take 2 tablets by mouth daily at bedtime      Facility-Administered Medications: None     Discharge Medication List as of 12/4/2024  1:30 AM        CONTINUE these medications which have CHANGED    Details   !! gabapentin (NEURONTIN) 300 mg capsule Take 1 capsule (300 mg total) by mouth 3 (three) times a day, Starting Wed 12/4/2024, Normal      !! gabapentin (NEURONTIN) 300 mg capsule Take 2 capsules (600 mg total) by mouth daily at  bedtime, Starting Wed 12/4/2024, Normal       !! - Potential duplicate medications found. Please discuss with provider.        CONTINUE these medications which have NOT CHANGED    Details   acetaminophen (TYLENOL) 325 mg tablet Take 2 tablets (650 mg total) by mouth every 6 (six) hours as needed for mild pain, Starting Fri 3/29/2024, No Print      albuterol (2.5 mg/3 mL) 0.083 % nebulizer solution Take 3 mL (2.5 mg total) by nebulization every 6 (six) hours as needed for wheezing or shortness of breath, Starting Tue 10/3/2023, Normal      amLODIPine-atorvastatin (CADUET) 10-80 MG per tablet Take 1 tablet by mouth daily, Historical Med      baclofen 10 mg tablet Take 5 mg by mouth 3 (three) times a day, Historical Med      bisacodyl (DULCOLAX) 10 mg suppository Insert 1 suppository (10 mg total) into the rectum daily as needed for constipation for up to 12 doses, Starting u 10/10/2024, Normal      budesonide-formoterol (SYMBICORT) 160-4.5 mcg/act inhaler Inhale 2 puffs 2 (two) times a day Rinse mouth after use., Historical Med      clopidogrel (PLAVIX) 75 mg tablet Take 1 tablet (75 mg total) by mouth daily, Starting Sat 10/27/2018, No Print      cromolyn (NASALCHROM) 5.2 MG/ACT nasal spray 1 spray into each nostril 3 (three) times a day, Starting Tue 6/18/2024, Normal      cyanocobalamin (VITAMIN B-12) 500 MCG tablet Take 1 tablet (500 mcg total) by mouth daily, Starting Tue 4/2/2024, No Print      Empagliflozin (JARDIANCE) 10 MG TABS tablet Take 10 mg by mouth every morning, Historical Med      Ergocalciferol (VITAMIN D2 PO) Take 50,000 Units by mouth once a week, Historical Med      latanoprost (XALATAN) 0.005 % ophthalmic solution Administer 1 drop to both eyes daily at bedtime, Historical Med      lidocaine (LIDODERM) 5 % Apply 1 patch topically over 12 hours daily Remove & Discard patch within 12 hours or as directed by MD, Starting Wed 4/3/2024, No Print      meclizine (ANTIVERT) 12.5 MG tablet Take 1 tablet  (12.5 mg total) by mouth every 8 (eight) hours as needed for dizziness, Starting Sun 7/28/2024, Normal      melatonin 3 mg Take 3 mg by mouth daily at bedtime as needed, Historical Med      metFORMIN (GLUCOPHAGE) 1000 MG tablet Take 1,000 mg by mouth 2 (two) times a day with meals, Historical Med      methenamine hippurate (HIPREX) 1 g tablet Take 1 tablet (1 g total) by mouth 2 (two) times a day with meals, Starting Wed 3/27/2024, Normal      mirtazapine (REMERON) 7.5 MG tablet Take 7.5 mg by mouth daily at bedtime, Historical Med      pantoprazole (PROTONIX) 40 mg tablet Take 40 mg by mouth daily, Historical Med      polyethylene glycol (MIRALAX) 17 g packet Take 17 g by mouth 2 (two) times a day, Starting Tue 6/18/2024, Normal      propranolol (INDERAL LA) 60 mg 24 hr capsule Take 60 mg by mouth daily, Historical Med      senna-docusate sodium (SENOKOT S) 8.6-50 mg per tablet Take 2 tablets by mouth daily at bedtime, Starting Thu 10/10/2024, Normal           No discharge procedures on file.  ED SEPSIS DOCUMENTATION   Time reflects when diagnosis was documented in both MDM as applicable and the Disposition within this note       Time User Action Codes Description Comment    12/4/2024  1:26 AM Brendon Pepper [E11.40] Diabetic neuropathy (HCC)     12/4/2024  1:27 AM Brendon Pepper [G35] Multiple sclerosis (HCC)     12/4/2024  1:29 AM Brendon Pepper [I10] Asymptomatic hypertension                  Brendon Pepper,   12/04/24 0837

## 2024-12-04 NOTE — TELEPHONE ENCOUNTER
Spoke to Tara and advised there is no provider in the office that day and the appointment on 12/6 had to be rescheduled. Confirmed 12/10 appointment.

## 2024-12-04 NOTE — ED NOTES
Roundtrip ordered for pt at this time. Family notified and told they will be updated with a PU time. Discharge papers on pts chart.      Zaida Coronado RN  12/04/24 0156

## 2024-12-10 ENCOUNTER — PROCEDURE VISIT (OUTPATIENT)
Dept: UROLOGY | Facility: CLINIC | Age: 74
End: 2024-12-10
Payer: MEDICARE

## 2024-12-10 VITALS
RESPIRATION RATE: 20 BRPM | HEART RATE: 96 BPM | OXYGEN SATURATION: 99 % | SYSTOLIC BLOOD PRESSURE: 150 MMHG | DIASTOLIC BLOOD PRESSURE: 60 MMHG

## 2024-12-10 DIAGNOSIS — N31.9 NEUROGENIC BLADDER: Primary | ICD-10-CM

## 2024-12-10 PROCEDURE — 51705 CHANGE OF BLADDER TUBE: CPT

## 2024-12-10 NOTE — PROGRESS NOTES
12/10/2024    Mathew Rico  1950  [id number]    Diagnosis  Chief Complaint    Neurogenic Bladder         Patient presents for SPT change managed by our office    Plan  Follow up in 4 weeks for SPT change    Procedure: Suprapubic Tube Change       Cystostomy tube change     Date/Time  12/10/2024 10:30 AM     Performed by  Litzy Hurtado RN   Authorized by  Alex Christy MD     [place] Protocol   Consent: Verbal consent obtained.  Consent given by: patient     Procedure Details   Patient tolerance: patient tolerated the procedure well with no immediate complications         Patient's SP dressing wet with foul smelling brownish drainage. Dressing removed.  Asked Nati DALTON to evaluate and since patient is asymptomatic no treatment indicated at this time.  SP site without signs of infection then treated with silver nitrate to granulation tissue.    Current catheter removed without difficulty after deflation of an intact balloon. Site prepped with Hibiclens, new 20F  suprapubic spt change via aseptic technique without incident, 10 ml balloon inflated with sterile water. irrigated easily for clear return, mild spasm noted. Patient tolerated well. Attached to drainage bag.  Vitals:    12/10/24 1021   BP: 150/60   BP Location: Right arm   Patient Position: Supine   Cuff Size: Adult   Pulse: 96   Resp: 20   SpO2: 99%         Litzy Hurtado RN    [Abdominal Pain] : abdominal pain [Constipation] : constipation [Back Pain] : back pain [Negative] : Heme/Lymph [Nausea] : no nausea [Diarrhea] : diarrhea [Vomiting] : no vomiting [Heartburn] : no heartburn [Melena] : no melena [Joint Pain] : no joint pain [Joint Stiffness] : no joint stiffness [Joint Swelling] : no joint swelling [Muscle Weakness] : no muscle weakness [Muscle Pain] : no muscle pain [Itching] : no itching [Skin Rash] : no skin rash

## 2024-12-14 ENCOUNTER — HOSPITAL ENCOUNTER (INPATIENT)
Facility: HOSPITAL | Age: 74
LOS: 4 days | Discharge: HOME HEALTH | DRG: 392 | End: 2024-12-18
Admitting: HOSPITALIST
Payer: MEDICARE

## 2024-12-14 ENCOUNTER — APPOINTMENT (EMERGENCY)
Dept: CT IMAGING | Facility: HOSPITAL | Age: 74
DRG: 392 | End: 2024-12-14
Payer: MEDICARE

## 2024-12-14 ENCOUNTER — APPOINTMENT (EMERGENCY)
Dept: RADIOLOGY | Facility: HOSPITAL | Age: 74
DRG: 392 | End: 2024-12-14
Payer: MEDICARE

## 2024-12-14 DIAGNOSIS — N30.90 CYSTITIS: ICD-10-CM

## 2024-12-14 DIAGNOSIS — Z86.73 HISTORY OF CVA (CEREBROVASCULAR ACCIDENT): ICD-10-CM

## 2024-12-14 DIAGNOSIS — K52.89 STERCORAL COLITIS: ICD-10-CM

## 2024-12-14 DIAGNOSIS — N30.00 ACUTE CYSTITIS WITHOUT HEMATURIA: ICD-10-CM

## 2024-12-14 DIAGNOSIS — R42 DIZZINESS: ICD-10-CM

## 2024-12-14 DIAGNOSIS — N32.89 BLADDER WALL THICKENING: ICD-10-CM

## 2024-12-14 DIAGNOSIS — R10.9 ABDOMINAL PAIN: Primary | ICD-10-CM

## 2024-12-14 LAB
ALBUMIN SERPL BCG-MCNC: 4.2 G/DL (ref 3.5–5)
ALP SERPL-CCNC: 90 U/L (ref 34–104)
ALT SERPL W P-5'-P-CCNC: 8 U/L (ref 7–52)
ANION GAP SERPL CALCULATED.3IONS-SCNC: 11 MMOL/L (ref 4–13)
APTT PPP: 26 SECONDS (ref 23–34)
AST SERPL W P-5'-P-CCNC: 10 U/L (ref 13–39)
ATRIAL RATE: 97 BPM
BASOPHILS # BLD AUTO: 0.1 THOUSANDS/ÂΜL (ref 0–0.1)
BASOPHILS NFR BLD AUTO: 1 % (ref 0–1)
BILIRUB SERPL-MCNC: 0.59 MG/DL (ref 0.2–1)
BUN SERPL-MCNC: 17 MG/DL (ref 5–25)
CALCIUM SERPL-MCNC: 10 MG/DL (ref 8.4–10.2)
CARDIAC TROPONIN I PNL SERPL HS: 4 NG/L (ref ?–50)
CHLORIDE SERPL-SCNC: 101 MMOL/L (ref 96–108)
CO2 SERPL-SCNC: 23 MMOL/L (ref 21–32)
CREAT SERPL-MCNC: 0.98 MG/DL (ref 0.6–1.3)
EOSINOPHIL # BLD AUTO: 0.31 THOUSAND/ÂΜL (ref 0–0.61)
EOSINOPHIL NFR BLD AUTO: 2 % (ref 0–6)
ERYTHROCYTE [DISTWIDTH] IN BLOOD BY AUTOMATED COUNT: 15.3 % (ref 11.6–15.1)
GFR SERPL CREATININE-BSD FRML MDRD: 75 ML/MIN/1.73SQ M
GLUCOSE SERPL-MCNC: 126 MG/DL (ref 65–140)
GLUCOSE SERPL-MCNC: 127 MG/DL (ref 65–140)
GLUCOSE SERPL-MCNC: 156 MG/DL (ref 65–140)
GLUCOSE SERPL-MCNC: 99 MG/DL (ref 65–140)
HCT VFR BLD AUTO: 48.3 % (ref 36.5–49.3)
HGB BLD-MCNC: 15.7 G/DL (ref 12–17)
IMM GRANULOCYTES # BLD AUTO: 0.04 THOUSAND/UL (ref 0–0.2)
IMM GRANULOCYTES NFR BLD AUTO: 0 % (ref 0–2)
INR PPP: 0.95 (ref 0.85–1.19)
LACTATE SERPL-SCNC: 1.2 MMOL/L (ref 0.5–2)
LIPASE SERPL-CCNC: 10 U/L (ref 11–82)
LYMPHOCYTES # BLD AUTO: 2.69 THOUSANDS/ÂΜL (ref 0.6–4.47)
LYMPHOCYTES NFR BLD AUTO: 18 % (ref 14–44)
MCH RBC QN AUTO: 27.5 PG (ref 26.8–34.3)
MCHC RBC AUTO-ENTMCNC: 32.5 G/DL (ref 31.4–37.4)
MCV RBC AUTO: 85 FL (ref 82–98)
MONOCYTES # BLD AUTO: 1.01 THOUSAND/ÂΜL (ref 0.17–1.22)
MONOCYTES NFR BLD AUTO: 7 % (ref 4–12)
NEUTROPHILS # BLD AUTO: 11.2 THOUSANDS/ÂΜL (ref 1.85–7.62)
NEUTS SEG NFR BLD AUTO: 72 % (ref 43–75)
NRBC BLD AUTO-RTO: 0 /100 WBCS
P AXIS: 56 DEGREES
PLATELET # BLD AUTO: 400 THOUSANDS/UL (ref 149–390)
PMV BLD AUTO: 8.5 FL (ref 8.9–12.7)
POTASSIUM SERPL-SCNC: 4 MMOL/L (ref 3.5–5.3)
PR INTERVAL: 164 MS
PROT SERPL-MCNC: 8.5 G/DL (ref 6.4–8.4)
PROTHROMBIN TIME: 12.9 SECONDS (ref 12.3–15)
QRS AXIS: 68 DEGREES
QRSD INTERVAL: 94 MS
QT INTERVAL: 372 MS
QTC INTERVAL: 472 MS
RBC # BLD AUTO: 5.7 MILLION/UL (ref 3.88–5.62)
SODIUM SERPL-SCNC: 135 MMOL/L (ref 135–147)
T WAVE AXIS: 25 DEGREES
VENTRICULAR RATE: 97 BPM
WBC # BLD AUTO: 15.35 THOUSAND/UL (ref 4.31–10.16)

## 2024-12-14 PROCEDURE — 85610 PROTHROMBIN TIME: CPT | Performed by: EMERGENCY MEDICINE

## 2024-12-14 PROCEDURE — 99285 EMERGENCY DEPT VISIT HI MDM: CPT

## 2024-12-14 PROCEDURE — 71045 X-RAY EXAM CHEST 1 VIEW: CPT

## 2024-12-14 PROCEDURE — 99222 1ST HOSP IP/OBS MODERATE 55: CPT | Performed by: NURSE PRACTITIONER

## 2024-12-14 PROCEDURE — 85025 COMPLETE CBC W/AUTO DIFF WBC: CPT

## 2024-12-14 PROCEDURE — 83605 ASSAY OF LACTIC ACID: CPT | Performed by: EMERGENCY MEDICINE

## 2024-12-14 PROCEDURE — 36415 COLL VENOUS BLD VENIPUNCTURE: CPT

## 2024-12-14 PROCEDURE — 74177 CT ABD & PELVIS W/CONTRAST: CPT

## 2024-12-14 PROCEDURE — 84484 ASSAY OF TROPONIN QUANT: CPT

## 2024-12-14 PROCEDURE — 83690 ASSAY OF LIPASE: CPT

## 2024-12-14 PROCEDURE — 96374 THER/PROPH/DIAG INJ IV PUSH: CPT

## 2024-12-14 PROCEDURE — NC001 PR NO CHARGE: Performed by: UROLOGY

## 2024-12-14 PROCEDURE — 87040 BLOOD CULTURE FOR BACTERIA: CPT | Performed by: EMERGENCY MEDICINE

## 2024-12-14 PROCEDURE — 87086 URINE CULTURE/COLONY COUNT: CPT | Performed by: HOSPITALIST

## 2024-12-14 PROCEDURE — 87077 CULTURE AEROBIC IDENTIFY: CPT | Performed by: HOSPITALIST

## 2024-12-14 PROCEDURE — 99223 1ST HOSP IP/OBS HIGH 75: CPT | Performed by: HOSPITALIST

## 2024-12-14 PROCEDURE — 82948 REAGENT STRIP/BLOOD GLUCOSE: CPT

## 2024-12-14 PROCEDURE — 87186 SC STD MICRODIL/AGAR DIL: CPT | Performed by: HOSPITALIST

## 2024-12-14 PROCEDURE — 80053 COMPREHEN METABOLIC PANEL: CPT

## 2024-12-14 PROCEDURE — 85730 THROMBOPLASTIN TIME PARTIAL: CPT | Performed by: EMERGENCY MEDICINE

## 2024-12-14 PROCEDURE — 93005 ELECTROCARDIOGRAM TRACING: CPT

## 2024-12-14 PROCEDURE — 99222 1ST HOSP IP/OBS MODERATE 55: CPT | Performed by: INTERNAL MEDICINE

## 2024-12-14 RX ORDER — ACETAMINOPHEN 325 MG/1
650 TABLET ORAL EVERY 6 HOURS PRN
Status: DISCONTINUED | OUTPATIENT
Start: 2024-12-14 | End: 2024-12-14 | Stop reason: SDUPTHER

## 2024-12-14 RX ORDER — TRAMADOL HYDROCHLORIDE 50 MG/1
50 TABLET ORAL EVERY 6 HOURS PRN
Status: DISCONTINUED | OUTPATIENT
Start: 2024-12-14 | End: 2024-12-18 | Stop reason: HOSPADM

## 2024-12-14 RX ORDER — INSULIN LISPRO 100 [IU]/ML
1-5 INJECTION, SOLUTION INTRAVENOUS; SUBCUTANEOUS
Status: DISCONTINUED | OUTPATIENT
Start: 2024-12-14 | End: 2024-12-18 | Stop reason: HOSPADM

## 2024-12-14 RX ORDER — INSULIN LISPRO 100 [IU]/ML
1-6 INJECTION, SOLUTION INTRAVENOUS; SUBCUTANEOUS
Status: DISCONTINUED | OUTPATIENT
Start: 2024-12-14 | End: 2024-12-18 | Stop reason: HOSPADM

## 2024-12-14 RX ORDER — PANTOPRAZOLE SODIUM 40 MG/1
40 TABLET, DELAYED RELEASE ORAL
Status: DISCONTINUED | OUTPATIENT
Start: 2024-12-14 | End: 2024-12-18 | Stop reason: HOSPADM

## 2024-12-14 RX ORDER — GABAPENTIN 300 MG/1
600 CAPSULE ORAL
Status: DISCONTINUED | OUTPATIENT
Start: 2024-12-14 | End: 2024-12-18 | Stop reason: HOSPADM

## 2024-12-14 RX ORDER — CROMOLYN SODIUM 5.2 MG
1 AEROSOL, SPRAY WITH PUMP (ML) NASAL
Status: DISCONTINUED | OUTPATIENT
Start: 2024-12-14 | End: 2024-12-14

## 2024-12-14 RX ORDER — BUDESONIDE AND FORMOTEROL FUMARATE DIHYDRATE 160; 4.5 UG/1; UG/1
2 AEROSOL RESPIRATORY (INHALATION) 2 TIMES DAILY
Status: DISCONTINUED | OUTPATIENT
Start: 2024-12-14 | End: 2024-12-18 | Stop reason: HOSPADM

## 2024-12-14 RX ORDER — AMLODIPINE BESYLATE 10 MG/1
10 TABLET ORAL DAILY
Status: DISCONTINUED | OUTPATIENT
Start: 2024-12-14 | End: 2024-12-18 | Stop reason: HOSPADM

## 2024-12-14 RX ORDER — SODIUM CHLORIDE 9 MG/ML
100 INJECTION, SOLUTION INTRAVENOUS CONTINUOUS
Status: DISCONTINUED | OUTPATIENT
Start: 2024-12-14 | End: 2024-12-16

## 2024-12-14 RX ORDER — POLYETHYLENE GLYCOL 3350 17 G/17G
17 POWDER, FOR SOLUTION ORAL 2 TIMES DAILY
Status: DISCONTINUED | OUTPATIENT
Start: 2024-12-14 | End: 2024-12-14

## 2024-12-14 RX ORDER — MIRTAZAPINE 7.5 MG/1
7.5 TABLET, FILM COATED ORAL
Status: DISCONTINUED | OUTPATIENT
Start: 2024-12-14 | End: 2024-12-18 | Stop reason: HOSPADM

## 2024-12-14 RX ORDER — LACTULOSE 10 G/15ML
30 SOLUTION ORAL ONCE
Status: COMPLETED | OUTPATIENT
Start: 2024-12-14 | End: 2024-12-14

## 2024-12-14 RX ORDER — ENOXAPARIN SODIUM 100 MG/ML
40 INJECTION SUBCUTANEOUS DAILY
Status: DISCONTINUED | OUTPATIENT
Start: 2024-12-14 | End: 2024-12-18 | Stop reason: HOSPADM

## 2024-12-14 RX ORDER — LATANOPROST 50 UG/ML
1 SOLUTION/ DROPS OPHTHALMIC
Status: DISCONTINUED | OUTPATIENT
Start: 2024-12-14 | End: 2024-12-18 | Stop reason: HOSPADM

## 2024-12-14 RX ORDER — HYDROMORPHONE HCL IN WATER/PF 6 MG/30 ML
0.2 PATIENT CONTROLLED ANALGESIA SYRINGE INTRAVENOUS ONCE
Status: COMPLETED | OUTPATIENT
Start: 2024-12-14 | End: 2024-12-14

## 2024-12-14 RX ORDER — ACETAMINOPHEN 325 MG/1
650 TABLET ORAL EVERY 6 HOURS PRN
Status: DISCONTINUED | OUTPATIENT
Start: 2024-12-14 | End: 2024-12-18 | Stop reason: HOSPADM

## 2024-12-14 RX ORDER — BACLOFEN 10 MG/1
5 TABLET ORAL 3 TIMES DAILY
Status: DISCONTINUED | OUTPATIENT
Start: 2024-12-14 | End: 2024-12-18 | Stop reason: HOSPADM

## 2024-12-14 RX ORDER — ATORVASTATIN CALCIUM 80 MG/1
80 TABLET, FILM COATED ORAL DAILY
Status: DISCONTINUED | OUTPATIENT
Start: 2024-12-14 | End: 2024-12-18 | Stop reason: HOSPADM

## 2024-12-14 RX ORDER — SODIUM CHLORIDE 9 MG/ML
125 INJECTION, SOLUTION INTRAVENOUS CONTINUOUS
Status: CANCELLED | OUTPATIENT
Start: 2024-12-14

## 2024-12-14 RX ORDER — ONDANSETRON 2 MG/ML
4 INJECTION INTRAMUSCULAR; INTRAVENOUS EVERY 6 HOURS PRN
Status: DISCONTINUED | OUTPATIENT
Start: 2024-12-14 | End: 2024-12-18 | Stop reason: HOSPADM

## 2024-12-14 RX ORDER — POLYETHYLENE GLYCOL 3350 17 G/17G
238 POWDER, FOR SOLUTION ORAL ONCE
Status: COMPLETED | OUTPATIENT
Start: 2024-12-14 | End: 2024-12-14

## 2024-12-14 RX ORDER — LIDOCAINE 50 MG/G
1 PATCH TOPICAL DAILY
Status: DISCONTINUED | OUTPATIENT
Start: 2024-12-14 | End: 2024-12-18 | Stop reason: HOSPADM

## 2024-12-14 RX ORDER — GABAPENTIN 300 MG/1
300 CAPSULE ORAL 3 TIMES DAILY
Status: DISCONTINUED | OUTPATIENT
Start: 2024-12-14 | End: 2024-12-18 | Stop reason: HOSPADM

## 2024-12-14 RX ORDER — CLOPIDOGREL BISULFATE 75 MG/1
75 TABLET ORAL DAILY
Status: DISCONTINUED | OUTPATIENT
Start: 2024-12-14 | End: 2024-12-18 | Stop reason: HOSPADM

## 2024-12-14 RX ORDER — ALBUTEROL SULFATE 0.83 MG/ML
2.5 SOLUTION RESPIRATORY (INHALATION) EVERY 6 HOURS PRN
Status: DISCONTINUED | OUTPATIENT
Start: 2024-12-14 | End: 2024-12-18 | Stop reason: HOSPADM

## 2024-12-14 RX ORDER — PROPRANOLOL HYDROCHLORIDE 60 MG/1
60 CAPSULE, EXTENDED RELEASE ORAL DAILY
Status: DISCONTINUED | OUTPATIENT
Start: 2024-12-14 | End: 2024-12-18 | Stop reason: HOSPADM

## 2024-12-14 RX ORDER — AMOXICILLIN 250 MG
2 CAPSULE ORAL
Status: DISCONTINUED | OUTPATIENT
Start: 2024-12-14 | End: 2024-12-18 | Stop reason: HOSPADM

## 2024-12-14 RX ORDER — METHENAMINE HIPPURATE 1000 MG/1
1 TABLET ORAL 2 TIMES DAILY WITH MEALS
Status: DISCONTINUED | OUTPATIENT
Start: 2024-12-14 | End: 2024-12-18 | Stop reason: HOSPADM

## 2024-12-14 RX ORDER — MECLIZINE HCL 12.5 MG 12.5 MG/1
12.5 TABLET ORAL EVERY 8 HOURS PRN
Status: DISCONTINUED | OUTPATIENT
Start: 2024-12-14 | End: 2024-12-18 | Stop reason: HOSPADM

## 2024-12-14 RX ADMIN — GABAPENTIN 600 MG: 300 CAPSULE ORAL at 21:55

## 2024-12-14 RX ADMIN — GABAPENTIN 300 MG: 300 CAPSULE ORAL at 11:40

## 2024-12-14 RX ADMIN — MINERAL OIL 1 ENEMA: 100 ENEMA RECTAL at 15:26

## 2024-12-14 RX ADMIN — DEXTROSE 1000 MG: 50 INJECTION, SOLUTION INTRAVENOUS at 12:40

## 2024-12-14 RX ADMIN — BACLOFEN 5 MG: 10 TABLET ORAL at 21:55

## 2024-12-14 RX ADMIN — SODIUM CHLORIDE 100 ML/HR: 0.9 INJECTION, SOLUTION INTRAVENOUS at 22:11

## 2024-12-14 RX ADMIN — POLYETHYLENE GLYCOL 3350 17 G: 17 POWDER, FOR SOLUTION ORAL at 11:54

## 2024-12-14 RX ADMIN — PROPRANOLOL HYDROCHLORIDE 60 MG: 60 CAPSULE, EXTENDED RELEASE ORAL at 11:41

## 2024-12-14 RX ADMIN — INSULIN LISPRO 1 UNITS: 100 INJECTION, SOLUTION INTRAVENOUS; SUBCUTANEOUS at 17:25

## 2024-12-14 RX ADMIN — CLOPIDOGREL BISULFATE 75 MG: 75 TABLET ORAL at 11:41

## 2024-12-14 RX ADMIN — IOHEXOL 100 ML: 350 INJECTION, SOLUTION INTRAVENOUS at 05:30

## 2024-12-14 RX ADMIN — BUDESONIDE AND FORMOTEROL FUMARATE DIHYDRATE 2 PUFF: 160; 4.5 AEROSOL RESPIRATORY (INHALATION) at 11:41

## 2024-12-14 RX ADMIN — HYDROMORPHONE HYDROCHLORIDE 0.2 MG: 0.2 INJECTION, SOLUTION INTRAMUSCULAR; INTRAVENOUS; SUBCUTANEOUS at 04:40

## 2024-12-14 RX ADMIN — AMLODIPINE BESYLATE 10 MG: 10 TABLET ORAL at 11:39

## 2024-12-14 RX ADMIN — MIRTAZAPINE 7.5 MG: 7.5 TABLET, FILM COATED ORAL at 21:59

## 2024-12-14 RX ADMIN — BACLOFEN 5 MG: 10 TABLET ORAL at 17:14

## 2024-12-14 RX ADMIN — ENOXAPARIN SODIUM 40 MG: 40 INJECTION SUBCUTANEOUS at 11:41

## 2024-12-14 RX ADMIN — LATANOPROST 1 DROP: 50 SOLUTION OPHTHALMIC at 22:00

## 2024-12-14 RX ADMIN — LIDOCAINE 1 PATCH: 50 PATCH TOPICAL at 11:43

## 2024-12-14 RX ADMIN — MINERAL OIL 1 ENEMA: 100 ENEMA RECTAL at 21:56

## 2024-12-14 RX ADMIN — PANTOPRAZOLE SODIUM 40 MG: 40 TABLET, DELAYED RELEASE ORAL at 11:39

## 2024-12-14 RX ADMIN — GABAPENTIN 300 MG: 300 CAPSULE ORAL at 17:14

## 2024-12-14 RX ADMIN — BUDESONIDE AND FORMOTEROL FUMARATE DIHYDRATE 2 PUFF: 160; 4.5 AEROSOL RESPIRATORY (INHALATION) at 17:14

## 2024-12-14 RX ADMIN — SODIUM CHLORIDE 1000 ML: 0.9 INJECTION, SOLUTION INTRAVENOUS at 10:54

## 2024-12-14 RX ADMIN — BACLOFEN 5 MG: 10 TABLET ORAL at 11:40

## 2024-12-14 RX ADMIN — Medication 500 MCG: at 11:40

## 2024-12-14 RX ADMIN — SENNOSIDES AND DOCUSATE SODIUM 2 TABLET: 50; 8.6 TABLET ORAL at 21:55

## 2024-12-14 RX ADMIN — SODIUM CHLORIDE 100 ML/HR: 0.9 INJECTION, SOLUTION INTRAVENOUS at 12:11

## 2024-12-14 RX ADMIN — ATORVASTATIN CALCIUM 80 MG: 80 TABLET, FILM COATED ORAL at 11:40

## 2024-12-14 RX ADMIN — GABAPENTIN 300 MG: 300 CAPSULE ORAL at 21:55

## 2024-12-14 RX ADMIN — POLYETHYLENE GLYCOL 3350 238 G: 17 POWDER, FOR SOLUTION ORAL at 15:26

## 2024-12-14 RX ADMIN — LACTULOSE 30 G: 20 SOLUTION ORAL at 11:41

## 2024-12-14 RX ADMIN — METHENAMINE HIPPURATE 1 G: 1000 TABLET ORAL at 17:25

## 2024-12-14 NOTE — CONSULTS
Consultation - Urology   Name: Mathew Rico 74 y.o. male I MRN: [id number]  Unit/Bed#: [place] [address] -02 I Date of Admission: 12/14/2024   Date of Service: 12/14/2024 I Hospital Day: 0   Consults  Physician Requesting Evaluation: Phan Vazquez, DO   Reason for Evaluation / Principal Problem: Bladder wall thickening    Assessment & Plan  Neurogenic bladder  SP tube dependent.  Recent difficulty with SP tube by skilled nursing.  SP tube exchange several days ago 12/10 by urology staff    Plan:  Maintain SP tube to straight drainage  Next exchange in 4-6 weeks  Multiple sclerosis (HCC)  With resultant neurogenic bladder  Acute cystitis  Recent SP tube exchange--may be precipitating event.  However, patient does not appear overly toxic.  Afebrile and normotensive.   Leukocytosis of 15K, normal lactic acid.  No urine analysis or urine culture pending.    Plan:  Workup and treatment per internal medicine-/appreciated.  Recommend discontinuing catheter if patient remains afebrile without culture evidence of predominating organism.    Bladder wall thickening  General urologic radiographic findings seen in radiation, bladder outlet obstruction, infection and multiple diagnoses.  No evidence of bulky lymphadenopathy or suspicious masses or lesions.  Gold standard cystoscopic evaluation performed by Dr. Javy Jeffries earlier this year, without evidence of masses or lesions.    Plan:  Additional  intervention or workup indicated.  Urology service signing off.    History of Present Illness   Mathew Rico is a 74 y.o. male with advanced MS and neurogenic bladder, presenting to our office 12/10 for SP tube exchanged after several months of difficulty with routine suprapubic tube exchanges performed by skilled nursing.  He presents with abdominal pain and is admitted to the internal medicine service for stercoral colitis.  He is receiving empiric ceftriaxone for potential UTI.  He remains afebrile,  Hemodynamically  stable without complaints of flank, abdominal, suprapubic pain or nausea/vomiting, dysuria or gross hematuria.  SP tube is patent for yellow cloudy urine.  Renal function within normal limits.  Mild leukocytosis.  No urine analysis or urine culture currently pending at this time.  Blood culture x 2 on the microbiology bench at this time.  CT of the abdomen and pelvis demonstrated bilateral well decompressed renal units without evidence of obstructing nephrolithiasis, perinephric bleeding, abscess, phlegmon, discrete fluid collections.  There were Subcentimeter bilateral renal cysts--statistically benign with largest measuring 1.4 cm in the left renal unit.  Reproductive organs were unremarkable.  Urinary bladder wall appeared thickened with large intraluminal air--secondary to recent  tract manipulation and perivesical stranding suggestive of inflammatory changes/potential acute cystitis.  However, no bladder hematoma or bladder stone appreciable on examination.    Urologic consultation requested for further management recommendations.    Review of Systems    Review of Systems - History obtained from chart review and the patient  General ROS: negative for - chills or fever  Respiratory ROS: no cough, shortness of breath, or wheezing  Cardiovascular ROS: no chest pain or dyspnea on exertion  Gastrointestinal ROS: positive for - abdominal pain and constipation  Genito-Urinary ROS: no dysuria, trouble voiding, or hematuria  Neurological ROS: no TIA or stroke symptoms    I have reviewed the patient's PMH, PSH, Social History, Family History, Meds, and Allergies  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HCC)     Bladder infection     Bronchitis     Constipation     Cough     Diabetes (HCC)     Diabetes mellitus (HCC)     Dizziness     Dysfunction of  eustachian tube     Erectile dysfunction of non-organic origin     Fatigue     Glaucoma     Hiatal hernia     Hypertension     Imbalance     Leg muscle spasm     Leukocytosis 2024    MS (multiple sclerosis) (HCC)     Multiple sclerosis (HCC)     Nephrolithiasis     Neurogenic bladder     No natural teeth     Sinus pain     Spinal stenosis     Strain of thoracic region     Stroke (HCC)     Suprapubic catheter (HCC)      Past Surgical History:   Procedure Laterality Date    APPENDECTOMY      BRAIN SURGERY      Coil placed in aneurysm    CEREBRAL ANEURYSM REPAIR      CYSTOSCOPY      CYSTOSCOPY      CYSTOSCOPY  2018    CYSTOSCOPY  01/15/2021    EYE SURGERY      transscleral cyclophotocoagulation noncontact YAG laser    IR SUPRAPUBIC CATHETER CHECK/CHANGE/REINSERTION/UPSIZE  3/28/2024    MYRINGOTOMY      with ventilation tube insertion    SC LITHOLAPAXY SMPL/SM <2.5 CM N/A 2019    Procedure: CYSTOSCOPY, holmium laser litholapaxy of bladder stones, EXCHANGE OF SP TUBE;  Surgeon: Javy Jeffries MD;  Location: [facility];  Service: Urology    SUPRAPUBIC CATHETER INSERTION       Social History     Tobacco Use    Smoking status: Former     Current packs/day: 0.00     Average packs/day: 0.5 packs/day for 31.0 years (15.5 ttl pk-yrs)     Types: Cigarettes     Start date:      Quit date:      Years since quittin.9    Smokeless tobacco: Never   Vaping Use    Vaping status: Never Used   Substance and Sexual Activity    Alcohol use: Not Currently    Drug use: No    Sexual activity: Not Currently     E-Cigarette/Vaping    E-Cigarette Use Never User      E-Cigarette/Vaping Substances    Nicotine No     THC No     CBD No     Flavoring No     Other No     Unknown No      Family History   Problem Relation Age of Onset    Heart attack Mother     Stroke Mother     Heart attack Father     Anuerysm Father         In Stomach     Aneurysm Father      Social History     Tobacco Use    Smoking status: Former      Current packs/day: 0.00     Average packs/day: 0.5 packs/day for 31.0 years (15.5 ttl pk-yrs)     Types: Cigarettes     Start date:      Quit date:      Years since quittin.9    Smokeless tobacco: Never   Vaping Use    Vaping status: Never Used   Substance and Sexual Activity    Alcohol use: Not Currently    Drug use: No    Sexual activity: Not Currently       Current Facility-Administered Medications:     acetaminophen (TYLENOL) tablet 650 mg, Q6H PRN    albuterol inhalation solution 2.5 mg, Q6H PRN    amLODIPine (NORVASC) tablet 10 mg, Daily **AND** atorvastatin (LIPITOR) tablet 80 mg, Daily    baclofen tablet 5 mg, TID    budesonide-formoterol (SYMBICORT) 160-4.5 mcg/act inhaler 2 puff, BID    ceftriaxone (ROCEPHIN) 1 g/50 mL in dextrose IVPB, Once    clopidogrel (PLAVIX) tablet 75 mg, Daily    cyanocobalamin (VITAMIN B-12) tablet 500 mcg, Daily    enoxaparin (LOVENOX) subcutaneous injection 40 mg, Daily    gabapentin (NEURONTIN) capsule 300 mg, TID    gabapentin (NEURONTIN) capsule 600 mg, HS    insulin lispro (HumALOG/ADMELOG) 100 units/mL subcutaneous injection 1-5 Units, HS    insulin lispro (HumALOG/ADMELOG) 100 units/mL subcutaneous injection 1-6 Units, TID AC **AND** Fingerstick Glucose (POCT), TID AC    lactulose (CHRONULAC) oral solution 30 g, Once    latanoprost (XALATAN) 0.005 % ophthalmic solution 1 drop, HS    lidocaine (LIDODERM) 5 % patch 1 patch, Daily    meclizine (ANTIVERT) tablet 12.5 mg, Q8H PRN    melatonin tablet 3 mg, HS PRN    methenamine hippurate (HIPREX) tablet 1 g, BID With Meals    mirtazapine (REMERON) tablet 7.5 mg, HS    ondansetron (ZOFRAN) injection 4 mg, Q6H PRN    pantoprazole (PROTONIX) EC tablet 40 mg, Daily Before Breakfast    polyethylene glycol (MIRALAX) packet 17 g, BID    propranolol (INDERAL LA) 24 hr capsule 60 mg, Daily    senna-docusate sodium (SENOKOT S) 8.6-50 mg per tablet 2 tablet, HS    sodium chloride 0.9 % bolus 1,000 mL, Once, Last Rate: 1,000 mL  (24 [id number])    sodium chloride 0.9 % infusion, Continuous  Prior to Admission Medications   Prescriptions Last Dose Informant Patient Reported? Taking?   Empagliflozin (JARDIANCE) 10 MG TABS tablet  Outside Facility (Specify) Yes No   Sig: Take 10 mg by mouth every morning   Ergocalciferol (VITAMIN D2 PO)  Outside Facility (Specify) Yes No   Sig: Take 50,000 Units by mouth once a week   acetaminophen (TYLENOL) 325 mg tablet  Outside Facility (Specify) No No   Sig: Take 2 tablets (650 mg total) by mouth every 6 (six) hours as needed for mild pain   albuterol (2.5 mg/3 mL) 0.083 % nebulizer solution  Outside Facility (Specify) No No   Sig: Take 3 mL (2.5 mg total) by nebulization every 6 (six) hours as needed for wheezing or shortness of breath   amLODIPine-atorvastatin (CADUET) 10-80 MG per tablet  Outside Facility (Specify) Yes No   Sig: Take 1 tablet by mouth daily   baclofen 10 mg tablet  Outside Facility (Specify) Yes No   Sig: Take 5 mg by mouth 3 (three) times a day   bisacodyl (DULCOLAX) 10 mg suppository  Outside Facility (Specify) No No   Sig: Insert 1 suppository (10 mg total) into the rectum daily as needed for constipation for up to 12 doses   budesonide-formoterol (SYMBICORT) 160-4.5 mcg/act inhaler  Outside Facility (Specify) Yes No   Sig: Inhale 2 puffs 2 (two) times a day Rinse mouth after use.   clopidogrel (PLAVIX) 75 mg tablet  Outside Facility (Specify) No No   Sig: Take 1 tablet (75 mg total) by mouth daily   cromolyn (NASALCHROM) 5.2 MG/ACT nasal spray  Outside Facility (Specify) No No   Si spray into each nostril 3 (three) times a day   cyanocobalamin (VITAMIN B-12) 500 MCG tablet  Outside Facility (Specify) No No   Sig: Take 1 tablet (500 mcg total) by mouth daily   gabapentin (NEURONTIN) 300 mg capsule   No No   Sig: Take 1 capsule (300 mg total) by mouth 3 (three) times a day   gabapentin (NEURONTIN) 300 mg capsule   No No   Sig: Take 2 capsules (600 mg total) by mouth daily at  bedtime   latanoprost (XALATAN) 0.005 % ophthalmic solution  Outside Facility (Specify) Yes No   Sig: Administer 1 drop to both eyes daily at bedtime   lidocaine (LIDODERM) 5 %  Outside Facility (Specify) No No   Sig: Apply 1 patch topically over 12 hours daily Remove & Discard patch within 12 hours or as directed by MD   meclizine (ANTIVERT) 12.5 MG tablet  Outside Facility (Specify) No No   Sig: Take 1 tablet (12.5 mg total) by mouth every 8 (eight) hours as needed for dizziness   melatonin 3 mg  Outside Facility (Specify) Yes No   Sig: Take 3 mg by mouth daily at bedtime as needed   metFORMIN (GLUCOPHAGE) 1000 MG tablet  Outside Facility (Specify) Yes No   Sig: Take 1,000 mg by mouth 2 (two) times a day with meals   methenamine hippurate (HIPREX) 1 g tablet  Outside Facility (Specify) No No   Sig: Take 1 tablet (1 g total) by mouth 2 (two) times a day with meals   mirtazapine (REMERON) 7.5 MG tablet  Outside Facility (Specify) Yes No   Sig: Take 7.5 mg by mouth daily at bedtime   pantoprazole (PROTONIX) 40 mg tablet  Outside Facility (Specify) Yes No   Sig: Take 40 mg by mouth daily   polyethylene glycol (MIRALAX) 17 g packet  Outside Facility (Specify) No No   Sig: Take 17 g by mouth 2 (two) times a day   propranolol (INDERAL LA) 60 mg 24 hr capsule  Outside Facility (Specify) Yes No   Sig: Take 60 mg by mouth daily   senna-docusate sodium (SENOKOT S) 8.6-50 mg per tablet  Outside Facility (Specify) No No   Sig: Take 2 tablets by mouth daily at bedtime      Facility-Administered Medications: None     Cephalexin and Cephalexin    Objective :  Temp:  [96 °F (35.6 °C)-98 °F (36.7 °C)] 96 °F (35.6 °C)  HR:  [96-98] 96  BP: (133-168)/(63-83) 151/74  Resp:  [16-18] 16  SpO2:  [94 %-98 %] 94 %  O2 Device: None (Room air)    I/O       None            Physical Exam General appearance: alert, appears stated age, and cooperative  Head: Normocephalic, without obvious abnormality, atraumatic  Neck: no JVD and supple,  symmetrical, trachea midline  Lungs: clear to auscultation bilaterally  Heart: regular rate and rhythm, S1, S2 normal, no murmur, click, rub or gallop  Abdomen: soft, non-tender; bowel sounds normal; no masses,  no organomegaly  Extremities: Advanced MS  Pulses: 2+ and symmetric  Neurologic: Grossly normal        Lab Results: I have reviewed the following results:CBC/BMP:   .     12/14/24 0444   WBC 15.35*   HGB 15.7   HCT 48.3   *   SODIUM 135   K 4.0      CO2 23   BUN 17   CREATININE 0.98   GLUC 126      Recent Labs     12/14/24 0444 12/14/24 0919   WBC 15.35*  --    HGB 15.7  --    HCT 48.3  --    *  --    SODIUM 135  --    K 4.0  --      --    CO2 23  --    BUN 17  --    CREATININE 0.98  --    GLUC 126  --    AST 10*  --    ALT 8  --    ALB 4.2  --    TBILI 0.59  --    ALKPHOS 90  --    PTT  --  26   INR  --  0.95     Lab Results   Component Value Date    COLORU Yellow 10/05/2024    COLORU Yellow 09/02/2016    CLARITYU Turbid 10/05/2024    CLARITYU Transparent 09/02/2016    SPECGRAV 1.015 10/05/2024    SPECGRAV 1.005 09/02/2016    PHUR 6.0 10/05/2024    PHUR 5.5 09/07/2024    PHUR 6 09/02/2016    LEUKOCYTESUR Large (A) 10/05/2024    LEUKOCYTESUR ++Large 09/02/2016    NITRITE Negative 10/05/2024    NITRITE neg 09/02/2016    PROTEINUA neg 09/02/2016    GLUCOSEU >=1000 (1%) (A) 10/05/2024    GLUCOSEU Negative 08/28/2015    KETONESU Negative 10/05/2024    KETONESU neg 09/02/2016    BILIRUBINUR Negative 10/05/2024    BILIRUBINUR neg 09/02/2016    BLOODU Moderate (A) 10/05/2024    BLOODU neg 09/02/2016   ,   Lab Results   Component Value Date    URINECX 30,000-39,000 cfu/ml Staphylococcus aureus (A) 10/05/2024    URINECX 40,000-49,000 cfu/ml 10/05/2024       Imaging Results Review: I reviewed radiology reports from this admission including: CT abdomen/pelvis.    CT abdomen pelvis with contrast [[id number]] Collected: 12/14/24 0708   Order Status: Completed Updated: 12/14/24 0723    Narrative:     CT ABDOMEN AND PELVIS WITH IV CONTRAST    INDICATION: abdominal pain. .    COMPARISON: CT abdomen/pelvis dated 10/5/2024.    TECHNIQUE: CT examination of the abdomen and pelvis was performed. Multiplanar 2D reformatted images were created from the source data.    This examination, like all CT scans performed in the [facility], was performed utilizing techniques to minimize radiation dose exposure, including the use of iterative reconstruction and automated exposure control. Radiation dose length  product (DLP) for this visit: 424 mGy-cm    IV Contrast: 100 mL of iohexol (OMNIPAQUE)  Enteric Contrast: Not administered.    FINDINGS:    ABDOMEN    LOWER CHEST: No clinically significant abnormality in the visualized lower chest.    LIVER/BILIARY TREE: Unremarkable.    GALLBLADDER: No calcified gallstones. No pericholecystic inflammatory change.    SPLEEN: Unremarkable.    PANCREAS: Unremarkable.    ADRENAL GLANDS: Unremarkable.    KIDNEYS/URETERS: A 1.4 cm left renal cyst. Subcentimeter hypoattenuating renal lesion(s), too small to characterize but statistically likely benign, which do not warrant follow-up (Radiology June 2019). No hydronephrosis.    STOMACH AND BOWEL: No evidence for bowel obstruction. There is large fecal residual throughout the colon and rectum consistent with constipation. The rectal vault is distended up to 8 cm in maximal diameter containing large retained stool and  demonstrates mild circumferential wall thickening and presacral edema; developing stercoral proctitis is not excluded.    APPENDIX: No findings to suggest appendicitis.    ABDOMINOPELVIC CAVITY: No ascites. No pneumoperitoneum. No lymphadenopathy.    VESSELS: Atherosclerotic disease. No abdominal aortic aneurysm.    PELVIS    REPRODUCTIVE ORGANS: Unremarkable for patient's age.    URINARY BLADDER: There is diffuse urinary bladder wall thickening, large intraluminal air, and mild perivesical  inflammatory change suggestive of acute cystitis. Indwelling suprapubic Cage catheter is in place.    ABDOMINAL WALL/INGUINAL REGIONS: Unremarkable.    BONES: No acute fracture or suspicious osseous lesion.   Impression:       Findings consistent with acute cystitis.       Administrative Statements   I have spent a total time of 20 minutes in caring for this patient on the day of the visit/encounter including Diagnostic results, Impressions, Counseling / Coordination of care, Documenting in the medical record, Reviewing / ordering tests, medicine, procedures  , and Obtaining or reviewing history  .

## 2024-12-14 NOTE — ASSESSMENT & PLAN NOTE
SP tube dependent.  Recent difficulty with SP tube by skilled nursing.  SP tube exchange several days ago 12/10 by urology staff    Plan:  Maintain SP tube to straight drainage  Next exchange in 4-6 weeks

## 2024-12-14 NOTE — PLAN OF CARE
Problem: Prexisting or High Potential for Compromised Skin Integrity  Goal: Skin integrity is maintained or improved  Description: INTERVENTIONS:  - Identify patients at risk for skin breakdown  - Assess and monitor skin integrity  - Assess and monitor nutrition and hydration status  - Monitor labs   - Assess for incontinence   - Turn and reposition patient  - Assist with mobility/ambulation  - Relieve pressure over bony prominences  - Avoid friction and shearing  - Provide appropriate hygiene as needed including keeping skin clean and dry  - Evaluate need for skin moisturizer/barrier cream  - Collaborate with interdisciplinary team   - Patient/family teaching  - Consider wound care consult   Outcome: Progressing     Problem: PAIN - ADULT  Goal: Verbalizes/displays adequate comfort level or baseline comfort level  Description: Interventions:  - Encourage patient to monitor pain and request assistance  - Assess pain using appropriate pain scale  - Administer analgesics based on type and severity of pain and evaluate response  - Implement non-pharmacological measures as appropriate and evaluate response  - Consider cultural and social influences on pain and pain management  - Notify physician/advanced practitioner if interventions unsuccessful or patient reports new pain  Outcome: Progressing     Problem: INFECTION - ADULT  Goal: Absence or prevention of progression during hospitalization  Description: INTERVENTIONS:  - Assess and monitor for signs and symptoms of infection  - Monitor lab/diagnostic results  - Monitor all insertion sites, i.e. indwelling lines, tubes, and drains  - Monitor endotracheal if appropriate and nasal secretions for changes in amount and color  - [place] appropriate cooling/warming therapies per order  - Administer medications as ordered  - Instruct and encourage patient and family to use good hand hygiene technique  - Identify and instruct in appropriate isolation precautions for  identified infection/condition  Outcome: Progressing  Goal: Absence of fever/infection during neutropenic period  Description: INTERVENTIONS:  - Monitor WBC    Outcome: Progressing     Problem: SAFETY ADULT  Goal: Patient will remain free of falls  Description: INTERVENTIONS:  - Educate patient/family on patient safety including physical limitations  - Instruct patient to call for assistance with activity   - Consult OT/PT to assist with strengthening/mobility   - Keep Call bell within reach  - Keep bed low and locked with side rails adjusted as appropriate  - Keep care items and personal belongings within reach  - Initiate and maintain comfort rounds  - Make Fall Risk Sign visible to staff  - Offer Toileting every 2 Hours, in advance of need  - Initiate/Maintain bed alarm  - Apply yellow socks and bracelet for high fall risk patients  - Consider moving patient to room near nurses station  Outcome: Progressing  Goal: Maintain or return to baseline ADL function  Description: INTERVENTIONS:  -  Assess patient's ability to carry out ADLs; assess patient's baseline for ADL function and identify physical deficits which impact ability to perform ADLs (bathing, care of mouth/teeth, toileting, grooming, dressing, etc.)  - Assess/evaluate cause of self-care deficits   - Assess range of motion  - Assess patient's mobility; develop plan if impaired  - Assess patient's need for assistive devices and provide as appropriate  - Encourage maximum independence but intervene and supervise when necessary  - Involve family in performance of ADLs  - Assess for home care needs following discharge   - Consider OT consult to assist with ADL evaluation and planning for discharge  - Provide patient education as appropriate  Outcome: Progressing  Goal: Maintains/Returns to pre admission functional level  Description: INTERVENTIONS:  - Perform AM-PAC 6 Click Basic Mobility/ Daily Activity assessment daily.  - Set and communicate daily mobility  goal to care team and patient/family/caregiver.   - Collaborate with rehabilitation services on mobility goals if consulted  - Perform Range of Motion 2 times a day.  - Reposition patient every 2 hours  - Out of bed to chair 3 times a day   - Out of bed for meals 3 times a day  - Out of bed for toileting  - Record patient progress and toleration of activity level   Outcome: Progressing     Problem: DISCHARGE PLANNING  Goal: Discharge to home or other facility with appropriate resources  Description: INTERVENTIONS:  - Identify barriers to discharge w/patient and caregiver  - Arrange for needed discharge resources and transportation as appropriate  - Identify discharge learning needs (meds, wound care, etc.)  - Arrange for interpretive services to assist at discharge as needed  - Refer to Case Management Department for coordinating discharge planning if the patient needs post-hospital services based on physician/advanced practitioner order or complex needs related to functional status, cognitive ability, or social support system  Outcome: Progressing     Problem: Knowledge Deficit  Goal: Patient/family/caregiver demonstrates understanding of disease process, treatment plan, medications, and discharge instructions  Description: Complete learning assessment and assess knowledge base.  Interventions:  - Provide teaching at level of understanding  - Provide teaching via preferred learning methods  Outcome: Progressing

## 2024-12-14 NOTE — ED CARE HANDOFF
Emergency Department Sign Out Note        Sign out and transfer of care from Dr. Pepper. See Separate Emergency Department note.     The patient, Mathew Rico, was evaluated by the previous provider for lower abd pain.    Workup Completed:  Labs and CT scan    ED Course / Workup Pending (followup):  Reviewed the results of the CT scan.  Possible cystitis.  Patient has an indwelling catheter.  Will change the bag and get a culture give a dose of antibiotics.  He had a heart rate greater than 90 and and a white count greater than 15 but at initial evaluation had no reported infectious symptoms.  Based on the CAT scan that is now in the differential show added on blood cultures and a lactic.  Continuing with IV fluids.  Also has evidence of stercoral proctitis and will need admission to the hospital for bowel regimen as this places the patient at higher risk for possible colon perforation.  He does not have a surgical abdomen at the moment so I do not think a surgical consult is necessary at the moment.  I discussed the case with internal medicine and they agree to admit to the hospital.                                     Procedures  Medical Decision Making          Disposition  Final diagnoses:   Abdominal pain   Cystitis   Stercoral colitis     Time reflects when diagnosis was documented in both MDM as applicable and the Disposition within this note       Time User Action Codes Description Comment    12/14/2024  5:54 AM Brendon Pepper Add [R10.9] Abdominal pain     12/14/2024  8:52 AM Xavier Diaz Add [N30.90] Cystitis     12/14/2024  8:52 AM Xavier Diaz [K52.89] Stercoral colitis           ED Disposition       ED Disposition   Admit    Condition   Stable    Date/Time   Sat Dec 14, 2024  8:55 AM    Comment   Case was discussed with SLIM and the patient's admission status was agreed to be Admission Status: inpatient status to the service of Dr. Vazquez .               Follow-up  Information       Follow up With Specialties Details Why Contact Info    Carolina Kumari MD Palliative Care Go in 3 days  [address] [facility] [place] [address] [phone]            Patient's Medications   Discharge Prescriptions    No medications on file     No discharge procedures on file.       ED Provider  Electronically Signed by     Xavier Diaz MD  12/14/24 2580

## 2024-12-14 NOTE — Clinical Note
Case was discussed with LISSETTE and the patient's admission status was agreed to be Admission Status: inpatient status to the service of Dr. Cluod

## 2024-12-14 NOTE — CONSULTS
"Consultation - Gastroenterology   Name: Mathew Rico 74 y.o. male I MRN: [id number]  Unit/Bed#: [place] [address] -02 I Date of Admission: 12/14/2024   Date of Service: 12/14/2024 I Hospital Day: 0       Inpatient consult to gastroenterology     Date/Time  12/14/2024 10:44 AM     Performed by  Zeeshan Arrington MD   Authorized by  Phan Vazquez DO           Physician Requesting Evaluation: Phan Vazquez DO   Reason for Evaluation / Principal Problem: Stercoral colitis    Assessment & Plan  Stercoral colitis  74 y.o. male with history of multiple sclerosis, chronic suprapubic catheter and chronic constipation who presented earlier today due to acute right lower quadrant abdominal pain without a bowel movement for the past 4 days found to have CT abdomen showing significant fecal stool burden as well as enlarged bladder overall concerning for chronic constipation likely in the setting of decreased mobility leading to GI consult.    - Currently on MiraLAX twice daily and Senokot at home with Dulcolax suppository as needed.  Patient reports he was only taking MiraLAX once a day and having a bowel movement maybe every other day  - Start mineral enema every 8 hours for 2 days  - Start MiraLAX bowel prep to be slowly consumed over the next 48 to 72 hours  - Up into side of bed as able  - Avoid opioids and anticholinergic medications  Multiple sclerosis (HCC)    Type 2 diabetes mellitus without complication, without long-term current use of insulin (HCC)  Lab Results   Component Value Date    HGBA1C 9.0 (H) 10/06/2024       No results for input(s): \"POCGLU\" in the last 72 hours.    Blood Sugar Average: Last 72 hrs:      Acute cystitis        History of Present Illness   HPI:  Mathew Rico is a 74 y.o. male with history of multiple sclerosis, chronic suprapubic catheter and chronic constipation who presented earlier today due to acute right lower quadrant abdominal pain without a bowel movement in a few days.  " He reports he takes MiraLAX daily at home and has a bowel movement every few days. He previously saw GI years ago given family history of pancreatic cancer MRI was ordered.  MRI performed 2020 without suspicious pancreatic lesions.  Denies melena or hematochezia.  Abdomen quite tender possibly in the setting of distended bladder.    EGD/colonoscopy 2018 with hiatal hernia and 2 subcentimeter polyps removed, no recall, no pathology    Review of Systems   Constitutional:  Negative for fever and unexpected weight change.   Gastrointestinal:  Positive for abdominal pain and constipation. Negative for blood in stool, diarrhea, nausea, rectal pain and vomiting.   All other systems reviewed and are negative.    I have reviewed the patient's PMH, PSH, Social History, Family History, Meds, and Allergies    Objective :  Temp:  [96 °F (35.6 °C)-98 °F (36.7 °C)] 96 °F (35.6 °C)  HR:  [96-98] 96  BP: (133-168)/(63-83) 151/74  Resp:  [18] 18  SpO2:  [94 %-98 %] 94 %  O2 Device: None (Room air)    Physical Exam  Vitals and nursing note reviewed.   Constitutional:       Appearance: He is well-developed and normal weight.   HENT:      Head: Atraumatic.   Eyes:      Conjunctiva/sclera: Conjunctivae normal.   Abdominal:      General: There is no distension.      Palpations: Abdomen is soft.      Tenderness: There is no abdominal tenderness. There is no guarding or rebound.   Skin:     General: Skin is warm and dry.      Capillary Refill: Capillary refill takes less than 2 seconds.   Psychiatric:         Mood and Affect: Mood normal.           Lab Results: I have reviewed the following results:none    Imaging Results Review: No pertinent imaging studies reviewed.  Other Study Results Review: No additional pertinent studies reviewed.    Administrative Statements   I have spent a total time of 32 minutes in caring for this patient on the day of the visit/encounter including Diagnostic results, Prognosis, Risks and benefits of tx options,  Instructions for management, and Patient and family education.

## 2024-12-14 NOTE — ASSESSMENT & PLAN NOTE
With chronic suprapubic catheter.    Send urine culture and then start empirically on Rocephin  He is not septic    He does have some bladder wall thickening on CT.  Will have urology evaluate him to see if he needs a cystoscopy.

## 2024-12-14 NOTE — ASSESSMENT & PLAN NOTE
"Lab Results   Component Value Date    HGBA1C 9.0 (H) 10/06/2024       No results for input(s): \"POCGLU\" in the last 72 hours.    Blood Sugar Average: Last 72 hrs:  Hold Jardiance and metformin  .  Placed on sliding scale insulin    "

## 2024-12-14 NOTE — ED PROVIDER NOTES
Time reflects when diagnosis was documented in both MDM as applicable and the Disposition within this note       Time User Action Codes Description Comment    12/14/2024  5:54 AM Brendon Pepper Add [R10.9] Abdominal pain     12/14/2024  8:52 AM Xavier Diaz [N30.90] Cystitis     12/14/2024  8:52 AM Xavier Diaz [K52.89] Stercoral colitis           ED Disposition       ED Disposition   Admit    Condition   Stable    Date/Time   Sat Dec 14, 2024  8:52 AM    Comment   Case was discussed with LISSETTE and the patient's admission status was agreed to be Admission Status: inpatient status to the service of   .               Assessment & Plan       Medical Decision Making  Patient with history as below presented with abdominal pain. History obtained from patient.    Differential diagnosis includes: obstruction, cholecystitis, pancreatitis, arrhythmia, anemia    Plan: CBC< CMP, lipase, troponin, ECG, CXR, CT a/p, dilaudid    ECG independently interpreted by myself as below. Labs reviewed and with a leukocytosis, nonspecific, other labs unremarkable. Imaging pending, signed out to next shift pending imaging results and disposition.    Amount and/or Complexity of Data Reviewed  Labs: ordered. Decision-making details documented in ED Course.  Radiology: ordered and independent interpretation performed.    Risk  Prescription drug management.  Decision regarding hospitalization.        ED Course as of 12/14/24 0854   Sat Dec 14, 2024   0505 Procedure Note: EKG  Date/Time: 12/14/24 5:05 AM   Interpreted by: Brendon Pepper   Indications / Diagnosis: CP  ECG reviewed by me, the ED Provider: yes   The EKG demonstrates:  Rhythm: normal sinus  Intervals: normal intervals  Axis: normal axis  QRS/Blocks: normal QRS  ST Changes: No acute ST Changes, no STD/DAI.   0541 hs TnI 0hr: 4  >3 hours of symptoms with value <5, no indication for delta       Medications   ceftriaxone (ROCEPHIN) 1 g/50 mL in dextrose  IVPB (has no administration in time range)   HYDROmorphone HCl (DILAUDID) injection 0.2 mg (0.2 mg Intravenous Given 12/14/24 0440)   iohexol (OMNIPAQUE) 350 MG/ML injection (MULTI-DOSE) 100 mL (100 mL Intravenous Given 12/14/24 0530)       ED Risk Strat Scores                          SBIRT 20yo+      Flowsheet Row Most Recent Value   Initial Alcohol Screen: US AUDIT-C     1. How often do you have a drink containing alcohol? 0 Filed at: 12/14/2024 0454   2. How many drinks containing alcohol do you have on a typical day you are drinking?  0 Filed at: 12/14/2024 0454   3a. Male UNDER 65: How often do you have five or more drinks on one occasion? 0 Filed at: 12/14/2024 0432   3b. FEMALE Any Age, or MALE 65+: How often do you have 4 or more drinks on one occassion? 0 Filed at: 12/14/2024 0454   Audit-C Score 0 Filed at: 12/14/2024 0454   ALPESH: How many times in the past year have you...    Used an illegal drug or used a prescription medication for non-medical reasons? Never Filed at: 12/14/2024 0454                            History of Present Illness       Chief Complaint   Patient presents with    Abdominal Pain     Patient states that his stomach started hurting this morning and has gotten worse over the past couple hours. Endorses nausea without vomiting, he states that he feels dizzy.       Past Medical History:   Diagnosis Date    Acute laryngitis     Acute nonsuppurative otitis media, unspecified laterality     Arm weakness     Arthritis     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HCC)     Bladder infection     Bronchitis     Constipation     Cough     Diabetes (HCC)     Diabetes mellitus (HCC)     Dizziness     Dysfunction of eustachian tube     Erectile dysfunction of non-organic origin     Fatigue     Glaucoma     Hiatal hernia     Hypertension     Imbalance     Leg muscle spasm     Leukocytosis 04/01/2024    MS (multiple sclerosis) (HCC)     Multiple sclerosis (HCC)     Nephrolithiasis     Neurogenic bladder     No  natural teeth     Sinus pain     Spinal stenosis     Strain of thoracic region     Stroke (HCC)     Suprapubic catheter (HCC)       Past Surgical History:   Procedure Laterality Date    APPENDECTOMY      BRAIN SURGERY      Coil placed in aneurysm    CEREBRAL ANEURYSM REPAIR      CYSTOSCOPY      CYSTOSCOPY      CYSTOSCOPY  2018    CYSTOSCOPY  01/15/2021    EYE SURGERY      transscleral cyclophotocoagulation noncontact YAG laser    IR SUPRAPUBIC CATHETER CHECK/CHANGE/REINSERTION/UPSIZE  3/28/2024    MYRINGOTOMY      with ventilation tube insertion    MA LITHOLAPAXY SMPL/SM <2.5 CM N/A 2019    Procedure: CYSTOSCOPY, holmium laser litholapaxy of bladder stones, EXCHANGE OF SP TUBE;  Surgeon: Javy Jeffries MD;  Location: [facility];  Service: Urology    SUPRAPUBIC CATHETER INSERTION        Family History   Problem Relation Age of Onset    Heart attack Mother     Stroke Mother     Heart attack Father     Anuerysm Father         In Stomach     Aneurysm Father       Social History     Tobacco Use    Smoking status: Former     Current packs/day: 0.00     Average packs/day: 0.5 packs/day for 31.0 years (15.5 ttl pk-yrs)     Types: Cigarettes     Start date:      Quit date:      Years since quittin.9    Smokeless tobacco: Never   Vaping Use    Vaping status: Never Used   Substance Use Topics    Alcohol use: Not Currently    Drug use: No      E-Cigarette/Vaping    E-Cigarette Use Never User       E-Cigarette/Vaping Substances    Nicotine No     THC No     CBD No     Flavoring No     Other No     Unknown No       I have reviewed and agree with the history as documented.     Patient is a 74-year-old male with a significant past medical history of appendectomy, diabetes, multiple sclerosis with chronic indwelling Cage catheter, constipation, presenting for evaluation of abdominal pain.  Patient reports that he has been having some generalized abdominal pain over the last day.  He is unable to tell me the  character of this pain.  He does seem to state that it waxes and wanes but does not have any specific provoking or relieving factors.  He is unsure if there has been any changes to his bowel movements.  He denies any nausea or vomiting.  He endorses some chest discomfort as well as shortness of breath.  He denies coughing.  He denies fevers or recent illness.  He is otherwise without complaint.        Review of Systems   Constitutional:  Negative for fever.   Respiratory:  Positive for shortness of breath.    Cardiovascular:  Positive for chest pain.   Gastrointestinal:  Positive for abdominal pain. Negative for nausea and vomiting.           Objective       ED Triage Vitals [12/14/24 0426]   Temperature Pulse Blood Pressure Respirations SpO2 Patient Position - Orthostatic VS   98 °F (36.7 °C) 98 168/83 18 96 % Lying      Temp Source Heart Rate Source BP Location FiO2 (%) Pain Score    Oral Monitor Right arm -- 6      Vitals      Date and Time Temp Pulse SpO2 Resp BP Pain Score FACES Pain Rating User   12/14/24 0755 -- 98 98 % 18 133/63 -- -- EK   12/14/24 0515 -- -- -- -- -- 4 -- KB   12/14/24 0440 -- -- -- -- -- 6 -- KB   12/14/24 0426 98 °F (36.7 °C) 98 96 % 18 168/83 6 -- KB            Physical Exam  Vitals and nursing note reviewed.   Constitutional:       General: He is not in acute distress.     Appearance: Normal appearance. He is not ill-appearing or toxic-appearing.   HENT:      Head: Normocephalic and atraumatic.      Right Ear: External ear normal.      Left Ear: External ear normal.      Nose: Nose normal.   Eyes:      General: No scleral icterus.        Right eye: No discharge.         Left eye: No discharge.      Extraocular Movements: Extraocular movements intact.      Conjunctiva/sclera: Conjunctivae normal.   Cardiovascular:      Rate and Rhythm: Normal rate.      Heart sounds: Normal heart sounds. No murmur heard.     No friction rub. No gallop.   Pulmonary:      Effort: Pulmonary effort is  normal. No respiratory distress.      Breath sounds: Normal breath sounds.   Abdominal:      General: Abdomen is flat. There is no distension.      Palpations: Abdomen is soft. There is no mass.      Tenderness: There is generalized abdominal tenderness. There is no guarding.   Genitourinary:     Comments: Indwelling suprapubic catheter clean, dry, intact  Skin:     General: Skin is warm and dry.   Neurological:      General: No focal deficit present.      Mental Status: He is alert.         Results Reviewed       Procedure Component Value Units Date/Time    Protime-INR [[id number]]     Lab Status: No result Specimen: Blood     APTT [[id number]]     Lab Status: No result Specimen: Blood     Lactic acid, plasma (w/reflex if result > 2.0) [[id number]]     Lab Status: No result Specimen: Blood     Blood culture #1 [[id number]]     Lab Status: No result Specimen: Blood     Blood culture #2 [[id number]]     Lab Status: No result Specimen: Blood     Urine culture [[id number]]     Lab Status: No result Specimen: Urine, Suprapubic catheter     HS Troponin 0hr (reflex protocol) [[id number]]  (Normal) Collected: 12/14/24 0444    Lab Status: Final result Specimen: Blood from Arm, Right Updated: 12/14/24 0514     hs TnI 0hr 4 ng/L     CMP [[id number]]  (Abnormal) Collected: 12/14/24 0444    Lab Status: Final result Specimen: Blood from Arm, Right Updated: 12/14/24 0506     Sodium 135 mmol/L      Potassium 4.0 mmol/L      Chloride 101 mmol/L      CO2 23 mmol/L      ANION GAP 11 mmol/L      BUN 17 mg/dL      Creatinine 0.98 mg/dL      Glucose 126 mg/dL      Calcium 10.0 mg/dL      AST 10 U/L      ALT 8 U/L      Alkaline Phosphatase 90 U/L      Total Protein 8.5 g/dL      Albumin 4.2 g/dL      Total Bilirubin 0.59 mg/dL      eGFR 75 ml/min/1.73sq m     Narrative:      National Kid[employer] Disease Foundation guidelines for Chronic Kidney Disease (CKD):     Stage 1 with normal or high GFR (GFR > 90 mL/min/1.73 square meters)    Stage 2 Mild CKD  (GFR = 60-89 mL/min/1.73 square meters)    Stage 3A Moderate CKD (GFR = 45-59 mL/min/1.73 square meters)    Stage 3B Moderate CKD (GFR = 30-44 mL/min/1.73 square meters)    Stage 4 Severe CKD (GFR = 15-29 mL/min/1.73 square meters)    Stage 5 End Stage CKD (GFR <15 mL/min/1.73 square meters)  Note: GFR calculation is accurate only with a steady state creatinine    Lipase [[id number]]  (Abnormal) Collected: 12/14/24 0444    Lab Status: Final result Specimen: Blood from Arm, Right Updated: 12/14/24 0506     Lipase 10 u/L     CBC and differential [[id number]]  (Abnormal) Collected: 12/14/24 0444    Lab Status: Final result Specimen: Blood from Arm, Right Updated: 12/14/24 0450     WBC 15.35 Thousand/uL      RBC 5.70 Million/uL      Hemoglobin 15.7 g/dL      Hematocrit 48.3 %      MCV 85 fL      MCH 27.5 pg      MCHC 32.5 g/dL      RDW 15.3 %      MPV 8.5 fL      Platelets 400 Thousands/uL      nRBC 0 /100 WBCs      Segmented % 72 %      Immature Grans % 0 %      Lymphocytes % 18 %      Monocytes % 7 %      Eosinophils Relative 2 %      Basophils Relative 1 %      Absolute Neutrophils 11.20 Thousands/µL      Absolute Immature Grans 0.04 Thousand/uL      Absolute Lymphocytes 2.69 Thousands/µL      Absolute Monocytes 1.01 Thousand/µL      Eosinophils Absolute 0.31 Thousand/µL      Basophils Absolute 0.10 Thousands/µL             CT abdomen pelvis with contrast   Final Interpretation by Sandy Martínez MD (12/14 2021)      Findings consistent with acute cystitis.      Constipation, probable fecal impaction, and mild circumferential wall thickening of the rectum with mild presacral edema; developing stercoral proctitis is not excluded.      The study was marked in EPIC for immediate notification.         Workstation performed: [id number]         XR chest 1 view portable   ED Interpretation by Brendon Pepper DO (12/14 2283)   No acute cardiopulmonary disease          Procedures    ED Medication and Procedure Management    Prior to Admission Medications   Prescriptions Last Dose Informant Patient Reported? Taking?   Empagliflozin (JARDIANCE) 10 MG TABS tablet  Outside Facility (Specify) Yes No   Sig: Take 10 mg by mouth every morning   Ergocalciferol (VITAMIN D2 PO)  Outside Facility (Specify) Yes No   Sig: Take 50,000 Units by mouth once a week   acetaminophen (TYLENOL) 325 mg tablet  Outside Facility (Specify) No No   Sig: Take 2 tablets (650 mg total) by mouth every 6 (six) hours as needed for mild pain   albuterol (2.5 mg/3 mL) 0.083 % nebulizer solution  Outside Facility (Specify) No No   Sig: Take 3 mL (2.5 mg total) by nebulization every 6 (six) hours as needed for wheezing or shortness of breath   amLODIPine-atorvastatin (CADUET) 10-80 MG per tablet  Outside Facility (Specify) Yes No   Sig: Take 1 tablet by mouth daily   baclofen 10 mg tablet  Outside Facility (Specify) Yes No   Sig: Take 5 mg by mouth 3 (three) times a day   bisacodyl (DULCOLAX) 10 mg suppository  Outside Facility (Specify) No No   Sig: Insert 1 suppository (10 mg total) into the rectum daily as needed for constipation for up to 12 doses   budesonide-formoterol (SYMBICORT) 160-4.5 mcg/act inhaler  Outside Facility (Specify) Yes No   Sig: Inhale 2 puffs 2 (two) times a day Rinse mouth after use.   clopidogrel (PLAVIX) 75 mg tablet  Outside Facility (Specify) No No   Sig: Take 1 tablet (75 mg total) by mouth daily   cromolyn (NASALCHROM) 5.2 MG/ACT nasal spray  Outside Facility (Specify) No No   Si spray into each nostril 3 (three) times a day   cyanocobalamin (VITAMIN B-12) 500 MCG tablet  Outside Facility (Specify) No No   Sig: Take 1 tablet (500 mcg total) by mouth daily   gabapentin (NEURONTIN) 300 mg capsule   No No   Sig: Take 1 capsule (300 mg total) by mouth 3 (three) times a day   gabapentin (NEURONTIN) 300 mg capsule   No No   Sig: Take 2 capsules (600 mg total) by mouth daily at bedtime   latanoprost (XALATAN) 0.005 % ophthalmic solution   Outside Facility (Specify) Yes No   Sig: Administer 1 drop to both eyes daily at bedtime   lidocaine (LIDODERM) 5 %  Outside Facility (Specify) No No   Sig: Apply 1 patch topically over 12 hours daily Remove & Discard patch within 12 hours or as directed by MD   meclizine (ANTIVERT) 12.5 MG tablet  Outside Facility (Specify) No No   Sig: Take 1 tablet (12.5 mg total) by mouth every 8 (eight) hours as needed for dizziness   melatonin 3 mg  Outside Facility (Specify) Yes No   Sig: Take 3 mg by mouth daily at bedtime as needed   metFORMIN (GLUCOPHAGE) 1000 MG tablet  Outside Facility (Specify) Yes No   Sig: Take 1,000 mg by mouth 2 (two) times a day with meals   methenamine hippurate (HIPREX) 1 g tablet  Outside Facility (Specify) No No   Sig: Take 1 tablet (1 g total) by mouth 2 (two) times a day with meals   mirtazapine (REMERON) 7.5 MG tablet  Outside Facility (Specify) Yes No   Sig: Take 7.5 mg by mouth daily at bedtime   pantoprazole (PROTONIX) 40 mg tablet  Outside Facility (Specify) Yes No   Sig: Take 40 mg by mouth daily   polyethylene glycol (MIRALAX) 17 g packet  Outside Facility (Specify) No No   Sig: Take 17 g by mouth 2 (two) times a day   propranolol (INDERAL LA) 60 mg 24 hr capsule  Outside Facility (Specify) Yes No   Sig: Take 60 mg by mouth daily   senna-docusate sodium (SENOKOT S) 8.6-50 mg per tablet  Outside Facility (Specify) No No   Sig: Take 2 tablets by mouth daily at bedtime      Facility-Administered Medications: None     Patient's Medications   Discharge Prescriptions    No medications on file     No discharge procedures on file.  ED SEPSIS DOCUMENTATION   Time reflects when diagnosis was documented in both MDM as applicable and the Disposition within this note       Time User Action Codes Description Comment    12/14/2024  5:54 AM Brendon Pepper Add [R10.9] Abdominal pain     12/14/2024  8:52 AM Xavier Diaz [N30.90] Cystitis     12/14/2024  8:52 AM Xavier Diaz  Add [K52.89] Stercoral colitis                  Brendon Pepper, DO  12/14/24 0855

## 2024-12-14 NOTE — ED NOTES
Suprapubic bag changed at this time. Unable to obtain urine sample as nothing was pulling back from catheter line.      Katerine Teague RN  12/14/24 1797

## 2024-12-14 NOTE — H&P
"H&P - Hospitalist   Name: Mathew Rico 74 y.o. male I MRN: [id number]  Unit/Bed#: ED-25 I Date of Admission: 12/14/2024   Date of Service: 12/14/2024 I Hospital Day: 0     Assessment & Plan  Stercoral colitis  Patient does have issues with chronic constipation.  He is already on a good bowel regimen as an outpatient.    Will continue MiraLAX twice daily, Senokot    Will give 1 dose of lactulose    Consult GI  Acute cystitis  With chronic suprapubic catheter.    Send urine culture and then start empirically on Rocephin  He is not septic    He does have some bladder wall thickening on CT.  Will have urology evaluate him to see if he needs a cystoscopy.  Multiple sclerosis ([facility])  With chronic urinary retention.  Has a chronic Suprapubic catheter.  Type 2 diabetes mellitus without complication, without long-term current use of insulin ([facility])  Lab Results   Component Value Date    HGBA1C 9.0 (H) 10/06/2024       No results for input(s): \"POCGLU\" in the last 72 hours.    Blood Sugar Average: Last 72 hrs:  Hold Jardiance and metformin  .  Placed on sliding scale insulin              Chief Complaint:     Abdominal Pain (Patient states that his stomach started hurting this morning and has gotten worse over the past couple hours. Endorses nausea without vomiting, he states that he feels dizzy.)    History of Present Illness  Mathew Rico is a 74 y.o. male with a PMH of multiple sclerosis, chronic suprapubic catheter, chronic constipation who presents with acute right lower quadrant abdominal pain.  He is chronically constipated.  States he has not had a bowel movement in few days.  It is very tender when you push on his right lower quadrant over the colon.  No fever or chills.  States he is eating and drinking okay.  Says pain is about 5 out of 10 when you push on it..    Review of Systems   Constitutional:  Negative for chills and fever.   HENT:  Negative for ear pain and sore throat.    Eyes:  Negative for pain and " visual disturbance.   Respiratory:  Negative for cough and shortness of breath.    Cardiovascular:  Negative for chest pain and palpitations.   Gastrointestinal:  Positive for abdominal pain and constipation. Negative for vomiting.   Genitourinary:  Negative for dysuria and hematuria.   Musculoskeletal:  Negative for arthralgias and back pain.   Skin:  Negative for color change and rash.   Neurological:  Negative for seizures and syncope.   All other systems reviewed and are negative.      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[facility])     Bladder infection     Bronchitis     Constipation     Cough     Diabetes ([facility])     Diabetes mellitus ([facility])     Dizziness     Dysfunction of eustachian tube     Erectile dysfunction of non-organic origin     Fatigue     Glaucoma     Hiatal hernia     Hypertension     Imbalance     Leg muscle spasm     Leukocytosis 04/01/2024    MS (multiple sclerosis) ([facility])     Multiple sclerosis ([facility])     Nephrolithiasis     Neurogenic bladder     No natural teeth     Sinus pain     Spinal stenosis     Strain of thoracic region     Stroke ([facility])     Suprapubic catheter ([facility])      Past Surgical History:   Procedure Laterality Date    APPENDECTOMY      BRAIN SURGERY      Coil placed in aneurysm    CEREBRAL ANEURYSM REPAIR      CYSTOSCOPY      CYSTOSCOPY      CYSTOSCOPY  06/11/2018    CYSTOSCOPY  01/15/2021    EYE SURGERY      transscleral cyclophotocoagulation noncontact YAG laser    IR SUPRAPUBIC CATHETER CHECK/CHANGE/REINSERTION/UPSIZE  3/28/2024    MYRINGOTOMY      with ventilation tube insertion    SD LITHOLAPAXY SMPL/SM <2.5 CM N/A 5/7/2019    Procedure: CYSTOSCOPY, holmium laser litholapaxy of bladder stones, EXCHANGE OF SP TUBE;  Surgeon: Javy Jeffries MD;  Location: [facility];  Service: Urology    SUPRAPUBIC CATHETER  INSERTION       Meds/Allergies:  Allergies:   Allergies   Allergen Reactions    Cephalexin Rash    Cephalexin Rash     Prior to Admission Medications   Prescriptions Last Dose Informant Patient Reported? Taking?   Empagliflozin (JARDIANCE) 10 MG TABS tablet  Outside Facility (Specify) Yes No   Sig: Take 10 mg by mouth every morning   Ergocalciferol (VITAMIN D2 PO)  Outside Facility (Specify) Yes No   Sig: Take 50,000 Units by mouth once a week   acetaminophen (TYLENOL) 325 mg tablet  Outside Facility (Specify) No No   Sig: Take 2 tablets (650 mg total) by mouth every 6 (six) hours as needed for mild pain   albuterol (2.5 mg/3 mL) 0.083 % nebulizer solution  Outside Facility (Specify) No No   Sig: Take 3 mL (2.5 mg total) by nebulization every 6 (six) hours as needed for wheezing or shortness of breath   amLODIPine-atorvastatin (CADUET) 10-80 MG per tablet  Outside Facility (Specify) Yes No   Sig: Take 1 tablet by mouth daily   baclofen 10 mg tablet  Outside Facility (Specify) Yes No   Sig: Take 5 mg by mouth 3 (three) times a day   bisacodyl (DULCOLAX) 10 mg suppository  Outside Facility (Specify) No No   Sig: Insert 1 suppository (10 mg total) into the rectum daily as needed for constipation for up to 12 doses   budesonide-formoterol (SYMBICORT) 160-4.5 mcg/act inhaler  Outside Facility (Specify) Yes No   Sig: Inhale 2 puffs 2 (two) times a day Rinse mouth after use.   clopidogrel (PLAVIX) 75 mg tablet  Outside Facility (Specify) No No   Sig: Take 1 tablet (75 mg total) by mouth daily   cromolyn (NASALCHROM) 5.2 MG/ACT nasal spray  Outside Facility (Specify) No No   Si spray into each nostril 3 (three) times a day   cyanocobalamin (VITAMIN B-12) 500 MCG tablet  Outside Facility (Specify) No No   Sig: Take 1 tablet (500 mcg total) by mouth daily   gabapentin (NEURONTIN) 300 mg capsule   No No   Sig: Take 1 capsule (300 mg total) by mouth 3 (three) times a day   gabapentin (NEURONTIN) 300 mg capsule   No No    Sig: Take 2 capsules (600 mg total) by mouth daily at bedtime   latanoprost (XALATAN) 0.005 % ophthalmic solution  Outside Facility (Specify) Yes No   Sig: Administer 1 drop to both eyes daily at bedtime   lidocaine (LIDODERM) 5 %  Outside Facility (Specify) No No   Sig: Apply 1 patch topically over 12 hours daily Remove & Discard patch within 12 hours or as directed by MD   meclizine (ANTIVERT) 12.5 MG tablet  Outside Facility (Specify) No No   Sig: Take 1 tablet (12.5 mg total) by mouth every 8 (eight) hours as needed for dizziness   melatonin 3 mg  Outside Facility (Specify) Yes No   Sig: Take 3 mg by mouth daily at bedtime as needed   metFORMIN (GLUCOPHAGE) 1000 MG tablet  Outside Facility (Specify) Yes No   Sig: Take 1,000 mg by mouth 2 (two) times a day with meals   methenamine hippurate (HIPREX) 1 g tablet  Outside Facility (Specify) No No   Sig: Take 1 tablet (1 g total) by mouth 2 (two) times a day with meals   mirtazapine (REMERON) 7.5 MG tablet  Outside Facility (Specify) Yes No   Sig: Take 7.5 mg by mouth daily at bedtime   pantoprazole (PROTONIX) 40 mg tablet  Outside Facility (Specify) Yes No   Sig: Take 40 mg by mouth daily   polyethylene glycol (MIRALAX) 17 g packet  Outside Facility (Specify) No No   Sig: Take 17 g by mouth 2 (two) times a day   propranolol (INDERAL LA) 60 mg 24 hr capsule  Outside Facility (Specify) Yes No   Sig: Take 60 mg by mouth daily   senna-docusate sodium (SENOKOT S) 8.6-50 mg per tablet  Outside Facility (Specify) No No   Sig: Take 2 tablets by mouth daily at bedtime      Facility-Administered Medications: None     Social History:     Social History     Socioeconomic History    Marital status: Single     Spouse name: Not on file    Number of children: Not on file    Years of education: Not on file    Highest education level: Not on file   Occupational History    Occupation:    retired   Tobacco Use    Smoking status: Former     Current packs/day: 0.00      Average packs/day: 0.5 packs/day for 31.0 years (15.5 ttl pk-yrs)     Types: Cigarettes     Start date:      Quit date:      Years since quittin.9    Smokeless tobacco: Never   Vaping Use    Vaping status: Never Used   Substance and Sexual Activity    Alcohol use: Not Currently    Drug use: No    Sexual activity: Not Currently   Other Topics Concern    Not on file   Social History Narrative    ** Merged History Encounter **          Social Drivers of Health     Financial Resource Strain: Not on file   Food Insecurity: No Food Insecurity (10/7/2024)    Nursing - Inadequate Food Risk Classification     Worried About Running Out of Food in the Last Year: Never true     Ran Out of Food in the Last Year: Never true     Ran Out of Food in the Last Year: Not on file   Transportation Needs: No Transportation Needs (10/7/2024)    PRAPARE - Transportation     Lack of Transportation (Medical): No     Lack of Transportation (Non-Medical): No   Physical Activity: Not on file   Stress: Not on file   Social Connections: Not on file   Intimate Partner Violence: Not on file   Housing Stability: Low Risk  (10/7/2024)    Housing Stability Vital Sign     Unable to Pay for Housing in the Last Year: No     Number of Times Moved in the Last Year: 0     Homeless in the Last Year: No         Objective   Vitals:   Blood Pressure: 133/63 (24 0755)  Pulse: 98 (24 0755)  Temperature: 98 °F (36.7 °C) (24 0426)  Temp Source: Oral (24 0426)  Respirations: 18 (24 0755)  SpO2: 98 % (24 0755)    Physical Exam  Vitals and nursing note reviewed.   Constitutional:       General: He is not in acute distress.     Appearance: He is well-developed.   HENT:      Head: Normocephalic and atraumatic.   Eyes:      Conjunctiva/sclera: Conjunctivae normal.   Cardiovascular:      Rate and Rhythm: Normal rate and regular rhythm.      Heart sounds: No murmur heard.  Pulmonary:      Effort: Pulmonary effort is normal.  No respiratory distress.      Breath sounds: Normal breath sounds.   Abdominal:      Palpations: Abdomen is soft.      Tenderness: There is abdominal tenderness (has moderate tenderness to RLQ.). There is no guarding or rebound.   Musculoskeletal:         General: No swelling.      Cervical back: Neck supple.      Right lower leg: No edema.      Left lower leg: No edema.   Skin:     General: Skin is warm and dry.      Capillary Refill: Capillary refill takes less than 2 seconds.   Neurological:      Mental Status: He is alert.   Psychiatric:         Mood and Affect: Mood normal.         Additional Data:   Lab Results: I have reviewed the following results:  Results from last 7 days   Lab Units 12/14/24  0444   WBC Thousand/uL 15.35*   HEMOGLOBIN g/dL 15.7   HEMATOCRIT % 48.3   PLATELETS Thousands/uL 400*   SEGS PCT % 72   LYMPHO PCT % 18   MONO PCT % 7   EOS PCT % 2     Results from last 7 days   Lab Units 12/14/24  0444   SODIUM mmol/L 135   POTASSIUM mmol/L 4.0   CHLORIDE mmol/L 101   CO2 mmol/L 23   ANION GAP mmol/L 11   BUN mg/dL 17   CREATININE mg/dL 0.98   CALCIUM mg/dL 10.0   ALBUMIN g/dL 4.2   TOTAL BILIRUBIN mg/dL 0.59   ALK PHOS U/L 90   ALT U/L 8   AST U/L 10*   EGFR ml/min/1.73sq m 75   GLUCOSE RANDOM mg/dL 126     Results from last 7 days   Lab Units 12/14/24  0919   INR  0.95               EKG, Pathology, and Other Studies Reviewed on Admission:   EKG      Administrative Statements       ** Please Note: This note has been constructed using a voice recognition system. **

## 2024-12-14 NOTE — ASSESSMENT & PLAN NOTE
"Lab Results   Component Value Date    HGBA1C 9.0 (H) 10/06/2024       No results for input(s): \"POCGLU\" in the last 72 hours.    Blood Sugar Average: Last 72 hrs:      "

## 2024-12-14 NOTE — DISCHARGE INSTRUCTIONS
You were evaluated in the Emergency Department today for abdominal pain. Your evaluation did not show evidence of medical conditions requiring emergent intervention at this time, and we feel safe discharging you.    Please schedule an appointment with your primary care physician within the next 2-3 days.    Return to the Emergency Department if you experience worsening or uncontrolled pain, fevers 100.4°F or greater, recurrent vomiting, inability to tolerate food or fluids by mouth, bloody stools or vomit, black or tarry stools, or any other concerning symptoms.    Thank you for choosing us for your care.

## 2024-12-14 NOTE — ASSESSMENT & PLAN NOTE
Recent SP tube exchange--may be precipitating event.  However, patient does not appear overly toxic.  Afebrile and normotensive.   Leukocytosis of 15K, normal lactic acid.  No urine analysis or urine culture pending.    Plan:  Workup and treatment per internal medicine-/appreciated.  Recommend discontinuing catheter if patient remains afebrile without culture evidence of predominating organism.

## 2024-12-14 NOTE — ED NOTES
Per admitting provider, hold off on antibiotic until urine specimen obtained.      Katerine Teague RN  12/14/24 [id number]

## 2024-12-14 NOTE — ASSESSMENT & PLAN NOTE
General urologic radiographic findings seen in radiation, bladder outlet obstruction, infection and multiple diagnoses.  No evidence of bulky lymphadenopathy or suspicious masses or lesions.  Gold standard cystoscopic evaluation performed by Dr. Javy Jeffries earlier this year, without evidence of masses or lesions.    Plan:  Additional  intervention or workup indicated.

## 2024-12-14 NOTE — ASSESSMENT & PLAN NOTE
74 y.o. male with history of multiple sclerosis, chronic suprapubic catheter and chronic constipation who presented earlier today due to acute right lower quadrant abdominal pain without a bowel movement for the past 4 days found to have CT abdomen showing significant fecal stool burden as well as enlarged bladder overall concerning for chronic constipation likely in the setting of decreased mobility leading to GI consult.    - Currently on MiraLAX twice daily and Senokot at home with Dulcolax suppository as needed.  Patient reports he was only taking MiraLAX once a day and having a bowel movement maybe every other day  - Start mineral enema every 8 hours for 2 days  - Start MiraLAX bowel prep to be slowly consumed over the next 48 to 72 hours  - Up into side of bed as able  - Avoid opioids and anticholinergic medications

## 2024-12-14 NOTE — ASSESSMENT & PLAN NOTE
Patient does have issues with chronic constipation.  He is already on a good bowel regimen as an outpatient.    Will continue MiraLAX twice daily, [place]    Will give 1 dose of lactulose    Consult GI

## 2024-12-15 LAB
ANION GAP SERPL CALCULATED.3IONS-SCNC: 7 MMOL/L (ref 4–13)
BASOPHILS # BLD AUTO: 0.08 THOUSANDS/ÂΜL (ref 0–0.1)
BASOPHILS NFR BLD AUTO: 1 % (ref 0–1)
BUN SERPL-MCNC: 10 MG/DL (ref 5–25)
CALCIUM SERPL-MCNC: 9 MG/DL (ref 8.4–10.2)
CHLORIDE SERPL-SCNC: 106 MMOL/L (ref 96–108)
CO2 SERPL-SCNC: 25 MMOL/L (ref 21–32)
CREAT SERPL-MCNC: 0.92 MG/DL (ref 0.6–1.3)
EOSINOPHIL # BLD AUTO: 0.63 THOUSAND/ÂΜL (ref 0–0.61)
EOSINOPHIL NFR BLD AUTO: 4 % (ref 0–6)
ERYTHROCYTE [DISTWIDTH] IN BLOOD BY AUTOMATED COUNT: 15.3 % (ref 11.6–15.1)
GFR SERPL CREATININE-BSD FRML MDRD: 81 ML/MIN/1.73SQ M
GLUCOSE SERPL-MCNC: 101 MG/DL (ref 65–140)
GLUCOSE SERPL-MCNC: 112 MG/DL (ref 65–140)
GLUCOSE SERPL-MCNC: 133 MG/DL (ref 65–140)
GLUCOSE SERPL-MCNC: 156 MG/DL (ref 65–140)
GLUCOSE SERPL-MCNC: 242 MG/DL (ref 65–140)
HCT VFR BLD AUTO: 40.8 % (ref 36.5–49.3)
HGB BLD-MCNC: 13 G/DL (ref 12–17)
IMM GRANULOCYTES # BLD AUTO: 0.06 THOUSAND/UL (ref 0–0.2)
IMM GRANULOCYTES NFR BLD AUTO: 0 % (ref 0–2)
LYMPHOCYTES # BLD AUTO: 3.51 THOUSANDS/ÂΜL (ref 0.6–4.47)
LYMPHOCYTES NFR BLD AUTO: 25 % (ref 14–44)
MAGNESIUM SERPL-MCNC: 1.6 MG/DL (ref 1.9–2.7)
MCH RBC QN AUTO: 26.8 PG (ref 26.8–34.3)
MCHC RBC AUTO-ENTMCNC: 31.9 G/DL (ref 31.4–37.4)
MCV RBC AUTO: 84 FL (ref 82–98)
MONOCYTES # BLD AUTO: 1.3 THOUSAND/ÂΜL (ref 0.17–1.22)
MONOCYTES NFR BLD AUTO: 9 % (ref 4–12)
NEUTROPHILS # BLD AUTO: 8.63 THOUSANDS/ÂΜL (ref 1.85–7.62)
NEUTS SEG NFR BLD AUTO: 61 % (ref 43–75)
NRBC BLD AUTO-RTO: 0 /100 WBCS
PLATELET # BLD AUTO: 317 THOUSANDS/UL (ref 149–390)
PMV BLD AUTO: 8.3 FL (ref 8.9–12.7)
POTASSIUM SERPL-SCNC: 3.8 MMOL/L (ref 3.5–5.3)
RBC # BLD AUTO: 4.85 MILLION/UL (ref 3.88–5.62)
SODIUM SERPL-SCNC: 138 MMOL/L (ref 135–147)
WBC # BLD AUTO: 14.21 THOUSAND/UL (ref 4.31–10.16)

## 2024-12-15 PROCEDURE — 80048 BASIC METABOLIC PNL TOTAL CA: CPT | Performed by: HOSPITALIST

## 2024-12-15 PROCEDURE — 85025 COMPLETE CBC W/AUTO DIFF WBC: CPT | Performed by: HOSPITALIST

## 2024-12-15 PROCEDURE — 83735 ASSAY OF MAGNESIUM: CPT | Performed by: HOSPITALIST

## 2024-12-15 PROCEDURE — 99232 SBSQ HOSP IP/OBS MODERATE 35: CPT | Performed by: HOSPITALIST

## 2024-12-15 PROCEDURE — 82948 REAGENT STRIP/BLOOD GLUCOSE: CPT

## 2024-12-15 RX ADMIN — BUDESONIDE AND FORMOTEROL FUMARATE DIHYDRATE 2 PUFF: 160; 4.5 AEROSOL RESPIRATORY (INHALATION) at 08:11

## 2024-12-15 RX ADMIN — INSULIN LISPRO 1 UNITS: 100 INJECTION, SOLUTION INTRAVENOUS; SUBCUTANEOUS at 12:06

## 2024-12-15 RX ADMIN — Medication 500 MCG: at 08:11

## 2024-12-15 RX ADMIN — LIDOCAINE 1 PATCH: 50 PATCH TOPICAL at 08:10

## 2024-12-15 RX ADMIN — MINERAL OIL 1 ENEMA: 100 ENEMA RECTAL at 06:03

## 2024-12-15 RX ADMIN — BUDESONIDE AND FORMOTEROL FUMARATE DIHYDRATE 2 PUFF: 160; 4.5 AEROSOL RESPIRATORY (INHALATION) at 17:12

## 2024-12-15 RX ADMIN — ATORVASTATIN CALCIUM 80 MG: 80 TABLET, FILM COATED ORAL at 08:11

## 2024-12-15 RX ADMIN — PANTOPRAZOLE SODIUM 40 MG: 40 TABLET, DELAYED RELEASE ORAL at 06:03

## 2024-12-15 RX ADMIN — ENOXAPARIN SODIUM 40 MG: 40 INJECTION SUBCUTANEOUS at 08:10

## 2024-12-15 RX ADMIN — PROPRANOLOL HYDROCHLORIDE 60 MG: 60 CAPSULE, EXTENDED RELEASE ORAL at 08:10

## 2024-12-15 RX ADMIN — GABAPENTIN 600 MG: 300 CAPSULE ORAL at 21:02

## 2024-12-15 RX ADMIN — MIRTAZAPINE 7.5 MG: 7.5 TABLET, FILM COATED ORAL at 21:02

## 2024-12-15 RX ADMIN — AMLODIPINE BESYLATE 10 MG: 10 TABLET ORAL at 08:11

## 2024-12-15 RX ADMIN — BACLOFEN 5 MG: 10 TABLET ORAL at 21:02

## 2024-12-15 RX ADMIN — BACLOFEN 5 MG: 10 TABLET ORAL at 17:12

## 2024-12-15 RX ADMIN — BACLOFEN 5 MG: 10 TABLET ORAL at 08:10

## 2024-12-15 RX ADMIN — LATANOPROST 1 DROP: 50 SOLUTION OPHTHALMIC at 21:04

## 2024-12-15 RX ADMIN — MINERAL OIL 1 ENEMA: 100 ENEMA RECTAL at 21:51

## 2024-12-15 RX ADMIN — ACETAMINOPHEN 650 MG: 325 TABLET, FILM COATED ORAL at 21:15

## 2024-12-15 RX ADMIN — INSULIN LISPRO 2 UNITS: 100 INJECTION, SOLUTION INTRAVENOUS; SUBCUTANEOUS at 21:04

## 2024-12-15 RX ADMIN — GABAPENTIN 300 MG: 300 CAPSULE ORAL at 17:12

## 2024-12-15 RX ADMIN — METHENAMINE HIPPURATE 1 G: 1000 TABLET ORAL at 08:10

## 2024-12-15 RX ADMIN — SODIUM CHLORIDE 100 ML/HR: 0.9 INJECTION, SOLUTION INTRAVENOUS at 21:51

## 2024-12-15 RX ADMIN — GABAPENTIN 300 MG: 300 CAPSULE ORAL at 08:10

## 2024-12-15 RX ADMIN — CEFTRIAXONE 1000 MG: 10 INJECTION, POWDER, FOR SOLUTION INTRAVENOUS at 11:26

## 2024-12-15 RX ADMIN — ACETAMINOPHEN 650 MG: 325 TABLET, FILM COATED ORAL at 08:22

## 2024-12-15 RX ADMIN — SENNOSIDES AND DOCUSATE SODIUM 2 TABLET: 50; 8.6 TABLET ORAL at 21:02

## 2024-12-15 RX ADMIN — CLOPIDOGREL BISULFATE 75 MG: 75 TABLET ORAL at 08:11

## 2024-12-15 RX ADMIN — GABAPENTIN 300 MG: 300 CAPSULE ORAL at 13:14

## 2024-12-15 RX ADMIN — METHENAMINE HIPPURATE 1 G: 1000 TABLET ORAL at 17:12

## 2024-12-15 NOTE — ASSESSMENT & PLAN NOTE
"Requested Prescriptions   Pending Prescriptions Disp Refills     levothyroxine (SYNTHROID/LEVOTHROID) 75 MCG tablet [Pharmacy Med Name: LEVOTHYROXINE 0.075MG (75MCG) TABS] 90 tablet 3     Sig: TAKE 1 TABLET(75 MCG) BY MOUTH DAILY  Last Written Prescription Date:  4/3/19  Last Fill Quantity: 90,  # refills: 3   Last office visit: 8/12/2019 with prescribing provider:  Mechelle Montgomery   Future Office Visit:  none       Thyroid Protocol Failed - 4/5/2020  9:25 AM        Failed - Normal TSH on file in past 12 months     Recent Labs   Lab Test 04/03/19  0926   TSH 1.47              Passed - Patient is 12 years or older        Passed - Recent (12 mo) or future (30 days) visit within the authorizing provider's specialty     Patient has had an office visit with the authorizing provider or a provider within the authorizing providers department within the previous 12 mos or has a future within next 30 days. See \"Patient Info\" tab in inbasket, or \"Choose Columns\" in Meds & Orders section of the refill encounter.            Passed - Medication is active on med list        Passed - No active pregnancy on record     If patient is pregnant or has had a positive pregnancy test, please check TSH.        Passed - No positive pregnancy test in past 12 months     If patient is pregnant or has had a positive pregnancy test, please check TSH.           Albaro Zacarias CMA ([facility])  " Has a chronic suprapubic catheter.  It was not draining much when he came in.  This may have also contributed to his abdominal pain.  Urology is working on it and have been flushing the catheter and it is now draining

## 2024-12-15 NOTE — QUICK NOTE
Continue Miralax enemas for two days then daily vs prn. Continue miralax bowel prep followed by Miralax bid. No need for endoscopic evaluation. GI will sign off at this time. Reach out to on call fellow for further GI needs.     Zeeshan Arrington  GI Fellow

## 2024-12-15 NOTE — PLAN OF CARE
Problem: Prexisting or High Potential for Compromised Skin Integrity  Goal: Skin integrity is maintained or improved  Description: INTERVENTIONS:  - Identify patients at risk for skin breakdown  - Assess and monitor skin integrity  - Assess and monitor nutrition and hydration status  - Monitor labs   - Assess for incontinence   - Turn and reposition patient  - Assist with mobility/ambulation  - Relieve pressure over bony prominences  - Avoid friction and shearing  - Provide appropriate hygiene as needed including keeping skin clean and dry  - Evaluate need for skin moisturizer/barrier cream  - Collaborate with interdisciplinary team   - Patient/family teaching  - Consider wound care consult   Outcome: Progressing     Problem: PAIN - ADULT  Goal: Verbalizes/displays adequate comfort level or baseline comfort level  Description: Interventions:  - Encourage patient to monitor pain and request assistance  - Assess pain using appropriate pain scale  - Administer analgesics based on type and severity of pain and evaluate response  - Implement non-pharmacological measures as appropriate and evaluate response  - Consider cultural and social influences on pain and pain management  - Notify physician/advanced practitioner if interventions unsuccessful or patient reports new pain  Outcome: Progressing     Problem: INFECTION - ADULT  Goal: Absence or prevention of progression during hospitalization  Description: INTERVENTIONS:  - Assess and monitor for signs and symptoms of infection  - Monitor lab/diagnostic results  - Monitor all insertion sites, i.e. indwelling lines, tubes, and drains  - Monitor endotracheal if appropriate and nasal secretions for changes in amount and color  - [place] appropriate cooling/warming therapies per order  - Administer medications as ordered  - Instruct and encourage patient and family to use good hand hygiene technique  - Identify and instruct in appropriate isolation precautions for  identified infection/condition  Outcome: Progressing  Goal: Absence of fever/infection during neutropenic period  Description: INTERVENTIONS:  - Monitor WBC    Outcome: Progressing     Problem: SAFETY ADULT  Goal: Patient will remain free of falls  Description: INTERVENTIONS:  - Educate patient/family on patient safety including physical limitations  - Instruct patient to call for assistance with activity   - Consult OT/PT to assist with strengthening/mobility   - Keep Call bell within reach  - Keep bed low and locked with side rails adjusted as appropriate  - Keep care items and personal belongings within reach  - Initiate and maintain comfort rounds  - Make Fall Risk Sign visible to staff  - Offer Toileting every 2 Hours, in advance of need  - Initiate/Maintain bed alarm  - Apply yellow socks and bracelet for high fall risk patients  - Consider moving patient to room near nurses station  Outcome: Progressing  Goal: Maintain or return to baseline ADL function  Description: INTERVENTIONS:  -  Assess patient's ability to carry out ADLs; assess patient's baseline for ADL function and identify physical deficits which impact ability to perform ADLs (bathing, care of mouth/teeth, toileting, grooming, dressing, etc.)  - Assess/evaluate cause of self-care deficits   - Assess range of motion  - Assess patient's mobility; develop plan if impaired  - Assess patient's need for assistive devices and provide as appropriate  - Encourage maximum independence but intervene and supervise when necessary  - Involve family in performance of ADLs  - Assess for home care needs following discharge   - Consider OT consult to assist with ADL evaluation and planning for discharge  - Provide patient education as appropriate  Outcome: Progressing  Goal: Maintains/Returns to pre admission functional level  Description: INTERVENTIONS:  - Perform AM-PAC 6 Click Basic Mobility/ Daily Activity assessment daily.  - Set and communicate daily mobility  goal to care team and patient/family/caregiver.   - Collaborate with rehabilitation services on mobility goals if consulted  - Perform Range of Motion 2 times a day.  - Reposition patient every 2 hours  - Out of bed to chair 3 times a day   - Out of bed for meals 3 times a day  - Out of bed for toileting  - Record patient progress and toleration of activity level   Outcome: Progressing     Problem: DISCHARGE PLANNING  Goal: Discharge to home or other facility with appropriate resources  Description: INTERVENTIONS:  - Identify barriers to discharge w/patient and caregiver  - Arrange for needed discharge resources and transportation as appropriate  - Identify discharge learning needs (meds, wound care, etc.)  - Arrange for interpretive services to assist at discharge as needed  - Refer to Case Management Department for coordinating discharge planning if the patient needs post-hospital services based on physician/advanced practitioner order or complex needs related to functional status, cognitive ability, or social support system  Outcome: Progressing     Problem: Knowledge Deficit  Goal: Patient/family/caregiver demonstrates understanding of disease process, treatment plan, medications, and discharge instructions  Description: Complete learning assessment and assess knowledge base.  Interventions:  - Provide teaching at level of understanding  - Provide teaching via preferred learning methods  Outcome: Progressing

## 2024-12-15 NOTE — PROGRESS NOTES
Progress Note - Hospitalist   Name: Mathew Rico 74 y.o. male I MRN: [id number]  Unit/Bed#: [place] [address] -02 I Date of Admission: 12/14/2024   Date of Service: 12/15/2024 I Hospital Day: 1    Assessment & Plan  Stercoral colitis  Patient has issues with chronic constipation presents with severe abdominal pain thought to be due to stercoral colitis and constipation      He is already on a good bowel regimen at his nursing facility, but we essentially doubled it in the hospital    He is having good bowel movements now and no longer having any abdominal pain  Acute cystitis  He may have a UTI.  Unfortunately ER did not send the urinalysis only a culture.    They were having trouble getting the urine sample as his suprapubic catheter was not draining yesterday    He is empirically on Rocephin    Await urine cultures to finalize    He also had some bladder wall thickening so I asked urology to see him in case he needed a cystoscopy  Multiple sclerosis (HCC)  Has chronic urinary retention requiring a suprapubic urine catheter  Type 2 diabetes mellitus without complication, without long-term current use of insulin (HCC)  Lab Results   Component Value Date    HGBA1C 9.0 (H) 10/06/2024       Recent Labs     12/14/24  1201 12/14/24  1608 12/14/24  2116 12/15/24  0747   POCGLU 99 156* 127 101       Blood Sugar Average: Last 72 hrs:      Holding his outpatient Jardiance and metformin    If he keeps having UTIs, we may need to consider discontinuing of the Jardiance altogether in the future    His blood sugars are well-controlled with the Humalog insulin sliding scale here in the hospital  Neurogenic bladder  Has a chronic suprapubic catheter.  It was not draining much when he came in.  This may have also contributed to his abdominal pain.  Urology is working on it and have been flushing the catheter and it is now draining  Bladder wall thickening  I asked urology to see him in consultation to see if he needs a  cystoscopy.        VTE Pharmacologic Prophylaxis:                 Current Length of Stay: 1 day(s)  Current Patient Status: Inpatient   Certification Statement:     Discharge Plan: Likely back to his nursing facility early this week.  Need urine cultures to finalize to see if he needs to continue antibiotics and if we can switch him over to pills as well as make sure GI does not want to do anything procedure wise with this stercoral colitis        Subjective   Doing much better today.  Abdominal pain has resolved.    He had several bowel movements yesterday after the bowel regimen was increased    No fever or chills    Objective   Vitals:   Temp (24hrs), Av.9 °F (36.6 °C), Min:97.3 °F (36.3 °C), Max:98.4 °F (36.9 °C)    Temp:  [97.3 °F (36.3 °C)-98.4 °F (36.9 °C)] 97.3 °F (36.3 °C)  HR:  [] 81  Resp:  [16-20] 17  BP: (113-163)/(52-76) 132/54  SpO2:  [91 %-97 %] 95 %  Body mass index is 24.62 kg/m².         Physical Exam  Vitals and nursing note reviewed.   Constitutional:       General: He is not in acute distress.     Appearance: He is well-developed.   HENT:      Head: Normocephalic and atraumatic.   Eyes:      Conjunctiva/sclera: Conjunctivae normal.   Cardiovascular:      Rate and Rhythm: Normal rate and regular rhythm.      Heart sounds: No murmur heard.  Pulmonary:      Effort: Pulmonary effort is normal. No respiratory distress.      Breath sounds: Normal breath sounds.   Abdominal:      Palpations: Abdomen is soft.      Tenderness: There is no abdominal tenderness.   Musculoskeletal:         General: No swelling.      Cervical back: Neck supple.      Right lower leg: No edema.   Skin:     General: Skin is warm and dry.      Capillary Refill: Capillary refill takes less than 2 seconds.   Neurological:      Mental Status: He is alert.   Psychiatric:         Mood and Affect: Mood normal.           Lab results  Results from last 7 days   Lab Units 12/15/24  0502 24  0919 24  0444   WBC  Thousand/uL 14.21*  --  15.35*   HEMOGLOBIN g/dL 13.0  --  15.7   PLATELETS Thousands/uL 317  --  400*   MCV fL 84  --  85   INR   --  0.95  --      Results from last 7 days   Lab Units 12/15/24  0502 12/14/24  0444   SODIUM mmol/L 138 135   POTASSIUM mmol/L 3.8 4.0   CHLORIDE mmol/L 106 101   CO2 mmol/L 25 23   ANION GAP mmol/L 7 11   BUN mg/dL 10 17   CREATININE mg/dL 0.92 0.98   CALCIUM mg/dL 9.0 10.0   ALBUMIN g/dL  --  4.2   TOTAL BILIRUBIN mg/dL  --  0.59   ALK PHOS U/L  --  90   ALT U/L  --  8   AST U/L  --  10*   EGFR ml/min/1.73sq m 81 75   GLUCOSE RANDOM mg/dL 112 126     Results from last 7 days   Lab Units 12/15/24  0502   MAGNESIUM mg/dL 1.6*             Last 24 Hours Medication List:     Current Facility-Administered Medications:     acetaminophen (TYLENOL) tablet 650 mg, Q6H PRN    albuterol inhalation solution 2.5 mg, Q6H PRN    amLODIPine (NORVASC) tablet 10 mg, Daily **AND** atorvastatin (LIPITOR) tablet 80 mg, Daily    baclofen tablet 5 mg, TID    budesonide-formoterol (SYMBICORT) 160-4.5 mcg/act inhaler 2 puff, BID    cefTRIAXone (ROCEPHIN) 1,000 mg in dextrose 5 % 50 mL IVPB, Q24H    clopidogrel (PLAVIX) tablet 75 mg, Daily    cyanocobalamin (VITAMIN B-12) tablet 500 mcg, Daily    enoxaparin (LOVENOX) subcutaneous injection 40 mg, Daily    gabapentin (NEURONTIN) capsule 300 mg, TID    gabapentin (NEURONTIN) capsule 600 mg, HS    insulin lispro (HumALOG/ADMELOG) 100 units/mL subcutaneous injection 1-5 Units, HS    insulin lispro (HumALOG/ADMELOG) 100 units/mL subcutaneous injection 1-6 Units, TID AC **AND** Fingerstick Glucose (POCT), TID AC    latanoprost (XALATAN) 0.005 % ophthalmic solution 1 drop, HS    lidocaine (LIDODERM) 5 % patch 1 patch, Daily    meclizine (ANTIVERT) tablet 12.5 mg, Q8H PRN    melatonin tablet 3 mg, HS PRN    methenamine hippurate (HIPREX) tablet 1 g, BID With Meals    mineral oil enema 1 enema, Q8H    mirtazapine (REMERON) tablet 7.5 mg, HS    morphine injection 1 mg,  Q4H PRN    ondansetron (ZOFRAN) injection 4 mg, Q6H PRN    pantoprazole (PROTONIX) EC tablet 40 mg, Daily Before Breakfast    propranolol (INDERAL LA) 24 hr capsule 60 mg, Daily    senna-docusate sodium (SENOKOT S) 8.6-50 mg per tablet 2 tablet, HS    sodium chloride 0.9 % infusion, Continuous, Last Rate: 100 mL/hr (12/14/24 1248)    traMADol (ULTRAM) tablet 50 mg, Q6H PRN

## 2024-12-15 NOTE — ASSESSMENT & PLAN NOTE
Patient has issues with chronic constipation presents with severe abdominal pain thought to be due to stercoral colitis and constipation      He is already on a good bowel regimen at his nursing facility, but we essentially doubled it in the hospital    He is having good bowel movements now and no longer having any abdominal pain

## 2024-12-15 NOTE — ASSESSMENT & PLAN NOTE
He may have a UTI.  Unfortunately ER did not send the urinalysis only a culture.    They were having trouble getting the urine sample as his suprapubic catheter was not draining yesterday    He is empirically on Rocephin    Await urine cultures to finalize    He also had some bladder wall thickening so I asked urology to see him in case he needed a cystoscopy

## 2024-12-15 NOTE — ASSESSMENT & PLAN NOTE
Lab Results   Component Value Date    HGBA1C 9.0 (H) 10/06/2024       Recent Labs     12/14/24  1201 12/14/24  1608 12/14/24  2116 12/15/24  0747   POCGLU 99 156* 127 101       Blood Sugar Average: Last 72 hrs:      Holding his outpatient Jardiance and metformin    If he keeps having UTIs, we may need to consider discontinuing of the Jardiance altogether in the future    His blood sugars are well-controlled with the Humalog insulin sliding scale here in the hospital

## 2024-12-15 NOTE — PLAN OF CARE
Problem: Prexisting or High Potential for Compromised Skin Integrity  Goal: Skin integrity is maintained or improved  Description: INTERVENTIONS:  - Identify patients at risk for skin breakdown  - Assess and monitor skin integrity  - Assess and monitor nutrition and hydration status  - Monitor labs   - Assess for incontinence   - Turn and reposition patient  - Assist with mobility/ambulation  - Relieve pressure over bony prominences  - Avoid friction and shearing  - Provide appropriate hygiene as needed including keeping skin clean and dry  - Evaluate need for skin moisturizer/barrier cream  - Collaborate with interdisciplinary team   - Patient/family teaching  - Consider wound care consult   Outcome: Progressing     Problem: PAIN - ADULT  Goal: Verbalizes/displays adequate comfort level or baseline comfort level  Description: Interventions:  - Encourage patient to monitor pain and request assistance  - Assess pain using appropriate pain scale  - Administer analgesics based on type and severity of pain and evaluate response  - Implement non-pharmacological measures as appropriate and evaluate response  - Consider cultural and social influences on pain and pain management  - Notify physician/advanced practitioner if interventions unsuccessful or patient reports new pain  Outcome: Progressing     Problem: INFECTION - ADULT  Goal: Absence or prevention of progression during hospitalization  Description: INTERVENTIONS:  - Assess and monitor for signs and symptoms of infection  - Monitor lab/diagnostic results  - Monitor all insertion sites, i.e. indwelling lines, tubes, and drains  - Monitor endotracheal if appropriate and nasal secretions for changes in amount and color  - Benoit appropriate cooling/warming therapies per order  - Administer medications as ordered  - Instruct and encourage patient and family to use good hand hygiene technique  - Identify and instruct in appropriate isolation precautions for  identified infection/condition  Outcome: Progressing  Goal: Absence of fever/infection during neutropenic period  Description: INTERVENTIONS:  - Monitor WBC    Outcome: Progressing     Problem: SAFETY ADULT  Goal: Patient will remain free of falls  Description: INTERVENTIONS:  - Educate patient/family on patient safety including physical limitations  - Instruct patient to call for assistance with activity   - Consult OT/PT to assist with strengthening/mobility   - Keep Call bell within reach  - Keep bed low and locked with side rails adjusted as appropriate  - Keep care items and personal belongings within reach  - Initiate and maintain comfort rounds  - Make Fall Risk Sign visible to staff  - Offer Toileting every 2 Hours, in advance of need  - Initiate/Maintain bed alarm  - Apply yellow socks and bracelet for high fall risk patients  - Consider moving patient to room near nurses station  Outcome: Progressing  Goal: Maintain or return to baseline ADL function  Description: INTERVENTIONS:  -  Assess patient's ability to carry out ADLs; assess patient's baseline for ADL function and identify physical deficits which impact ability to perform ADLs (bathing, care of mouth/teeth, toileting, grooming, dressing, etc.)  - Assess/evaluate cause of self-care deficits   - Assess range of motion  - Assess patient's mobility; develop plan if impaired  - Assess patient's need for assistive devices and provide as appropriate  - Encourage maximum independence but intervene and supervise when necessary  - Involve family in performance of ADLs  - Assess for home care needs following discharge   - Consider OT consult to assist with ADL evaluation and planning for discharge  - Provide patient education as appropriate  Outcome: Progressing  Goal: Maintains/Returns to pre admission functional level  Description: INTERVENTIONS:  - Perform AM-PAC 6 Click Basic Mobility/ Daily Activity assessment daily.  - Set and communicate daily mobility  goal to care team and patient/family/caregiver.   - Collaborate with rehabilitation services on mobility goals if consulted  - Perform Range of Motion 2 times a day.  - Reposition patient every 2 hours  - Out of bed to chair 3 times a day   - Out of bed for meals 3 times a day  - Out of bed for toileting  - Record patient progress and toleration of activity level   Outcome: Progressing     Problem: DISCHARGE PLANNING  Goal: Discharge to home or other facility with appropriate resources  Description: INTERVENTIONS:  - Identify barriers to discharge w/patient and caregiver  - Arrange for needed discharge resources and transportation as appropriate  - Identify discharge learning needs (meds, wound care, etc.)  - Arrange for interpretive services to assist at discharge as needed  - Refer to Case Management Department for coordinating discharge planning if the patient needs post-hospital services based on physician/advanced practitioner order or complex needs related to functional status, cognitive ability, or social support system  Outcome: Progressing     Problem: Knowledge Deficit  Goal: Patient/family/caregiver demonstrates understanding of disease process, treatment plan, medications, and discharge instructions  Description: Complete learning assessment and assess knowledge base.  Interventions:  - Provide teaching at level of understanding  - Provide teaching via preferred learning methods  Outcome: Progressing

## 2024-12-16 PROBLEM — R82.71 ASYMPTOMATIC BACTERIURIA: Status: ACTIVE | Noted: 2024-07-26

## 2024-12-16 LAB
ANION GAP SERPL CALCULATED.3IONS-SCNC: 6 MMOL/L (ref 4–13)
ATRIAL RATE: 97 BPM
BASOPHILS # BLD AUTO: 0.08 THOUSANDS/ÂΜL (ref 0–0.1)
BASOPHILS NFR BLD AUTO: 1 % (ref 0–1)
BUN SERPL-MCNC: 9 MG/DL (ref 5–25)
CALCIUM SERPL-MCNC: 8.5 MG/DL (ref 8.4–10.2)
CHLORIDE SERPL-SCNC: 111 MMOL/L (ref 96–108)
CO2 SERPL-SCNC: 23 MMOL/L (ref 21–32)
CREAT SERPL-MCNC: 0.79 MG/DL (ref 0.6–1.3)
EOSINOPHIL # BLD AUTO: 0.72 THOUSAND/ÂΜL (ref 0–0.61)
EOSINOPHIL NFR BLD AUTO: 8 % (ref 0–6)
ERYTHROCYTE [DISTWIDTH] IN BLOOD BY AUTOMATED COUNT: 15.2 % (ref 11.6–15.1)
GFR SERPL CREATININE-BSD FRML MDRD: 87 ML/MIN/1.73SQ M
GLUCOSE SERPL-MCNC: 116 MG/DL (ref 65–140)
GLUCOSE SERPL-MCNC: 128 MG/DL (ref 65–140)
GLUCOSE SERPL-MCNC: 152 MG/DL (ref 65–140)
GLUCOSE SERPL-MCNC: 159 MG/DL (ref 65–140)
GLUCOSE SERPL-MCNC: 246 MG/DL (ref 65–140)
HCT VFR BLD AUTO: 36.8 % (ref 36.5–49.3)
HGB BLD-MCNC: 11.8 G/DL (ref 12–17)
IMM GRANULOCYTES # BLD AUTO: 0.03 THOUSAND/UL (ref 0–0.2)
IMM GRANULOCYTES NFR BLD AUTO: 0 % (ref 0–2)
LYMPHOCYTES # BLD AUTO: 3.34 THOUSANDS/ÂΜL (ref 0.6–4.47)
LYMPHOCYTES NFR BLD AUTO: 35 % (ref 14–44)
MAGNESIUM SERPL-MCNC: 1.6 MG/DL (ref 1.9–2.7)
MCH RBC QN AUTO: 26.9 PG (ref 26.8–34.3)
MCHC RBC AUTO-ENTMCNC: 32.1 G/DL (ref 31.4–37.4)
MCV RBC AUTO: 84 FL (ref 82–98)
MONOCYTES # BLD AUTO: 0.86 THOUSAND/ÂΜL (ref 0.17–1.22)
MONOCYTES NFR BLD AUTO: 9 % (ref 4–12)
NEUTROPHILS # BLD AUTO: 4.62 THOUSANDS/ÂΜL (ref 1.85–7.62)
NEUTS SEG NFR BLD AUTO: 47 % (ref 43–75)
NRBC BLD AUTO-RTO: 0 /100 WBCS
P AXIS: 56 DEGREES
PLATELET # BLD AUTO: 302 THOUSANDS/UL (ref 149–390)
PMV BLD AUTO: 8.4 FL (ref 8.9–12.7)
POTASSIUM SERPL-SCNC: 3.8 MMOL/L (ref 3.5–5.3)
PR INTERVAL: 164 MS
QRS AXIS: 68 DEGREES
QRSD INTERVAL: 94 MS
QT INTERVAL: 372 MS
QTC INTERVAL: 472 MS
RBC # BLD AUTO: 4.38 MILLION/UL (ref 3.88–5.62)
SODIUM SERPL-SCNC: 140 MMOL/L (ref 135–147)
T WAVE AXIS: 25 DEGREES
VENTRICULAR RATE: 97 BPM
WBC # BLD AUTO: 9.65 THOUSAND/UL (ref 4.31–10.16)

## 2024-12-16 PROCEDURE — 99232 SBSQ HOSP IP/OBS MODERATE 35: CPT | Performed by: STUDENT IN AN ORGANIZED HEALTH CARE EDUCATION/TRAINING PROGRAM

## 2024-12-16 PROCEDURE — 93010 ELECTROCARDIOGRAM REPORT: CPT | Performed by: INTERNAL MEDICINE

## 2024-12-16 PROCEDURE — 82948 REAGENT STRIP/BLOOD GLUCOSE: CPT

## 2024-12-16 PROCEDURE — 85025 COMPLETE CBC W/AUTO DIFF WBC: CPT | Performed by: HOSPITALIST

## 2024-12-16 PROCEDURE — 83735 ASSAY OF MAGNESIUM: CPT | Performed by: HOSPITALIST

## 2024-12-16 PROCEDURE — 80048 BASIC METABOLIC PNL TOTAL CA: CPT | Performed by: HOSPITALIST

## 2024-12-16 RX ORDER — NYSTATIN 100000 [USP'U]/G
POWDER TOPICAL 2 TIMES DAILY
Status: DISCONTINUED | OUTPATIENT
Start: 2024-12-16 | End: 2024-12-18 | Stop reason: HOSPADM

## 2024-12-16 RX ORDER — MAGNESIUM SULFATE HEPTAHYDRATE 40 MG/ML
2 INJECTION, SOLUTION INTRAVENOUS ONCE
Status: COMPLETED | OUTPATIENT
Start: 2024-12-16 | End: 2024-12-16

## 2024-12-16 RX ADMIN — METHENAMINE HIPPURATE 1 G: 1000 TABLET ORAL at 16:53

## 2024-12-16 RX ADMIN — ENOXAPARIN SODIUM 40 MG: 40 INJECTION SUBCUTANEOUS at 08:14

## 2024-12-16 RX ADMIN — MIRTAZAPINE 7.5 MG: 7.5 TABLET, FILM COATED ORAL at 21:30

## 2024-12-16 RX ADMIN — INSULIN LISPRO 1 UNITS: 100 INJECTION, SOLUTION INTRAVENOUS; SUBCUTANEOUS at 16:52

## 2024-12-16 RX ADMIN — BACLOFEN 5 MG: 10 TABLET ORAL at 08:14

## 2024-12-16 RX ADMIN — INSULIN LISPRO 2 UNITS: 100 INJECTION, SOLUTION INTRAVENOUS; SUBCUTANEOUS at 21:29

## 2024-12-16 RX ADMIN — LIDOCAINE 1 PATCH: 50 PATCH TOPICAL at 08:14

## 2024-12-16 RX ADMIN — BACLOFEN 5 MG: 10 TABLET ORAL at 21:29

## 2024-12-16 RX ADMIN — ATORVASTATIN CALCIUM 80 MG: 80 TABLET, FILM COATED ORAL at 08:15

## 2024-12-16 RX ADMIN — BACLOFEN 5 MG: 10 TABLET ORAL at 16:52

## 2024-12-16 RX ADMIN — MAGNESIUM SULFATE HEPTAHYDRATE 2 G: 40 INJECTION, SOLUTION INTRAVENOUS at 10:24

## 2024-12-16 RX ADMIN — SODIUM CHLORIDE 100 ML/HR: 0.9 INJECTION, SOLUTION INTRAVENOUS at 08:14

## 2024-12-16 RX ADMIN — GABAPENTIN 600 MG: 300 CAPSULE ORAL at 21:29

## 2024-12-16 RX ADMIN — MINERAL OIL 1 ENEMA: 100 ENEMA RECTAL at 05:50

## 2024-12-16 RX ADMIN — AMLODIPINE BESYLATE 10 MG: 10 TABLET ORAL at 08:14

## 2024-12-16 RX ADMIN — GABAPENTIN 300 MG: 300 CAPSULE ORAL at 16:52

## 2024-12-16 RX ADMIN — PROPRANOLOL HYDROCHLORIDE 60 MG: 60 CAPSULE, EXTENDED RELEASE ORAL at 08:17

## 2024-12-16 RX ADMIN — GABAPENTIN 300 MG: 300 CAPSULE ORAL at 08:14

## 2024-12-16 RX ADMIN — NYSTATIN: 100000 POWDER TOPICAL at 10:24

## 2024-12-16 RX ADMIN — GABAPENTIN 300 MG: 300 CAPSULE ORAL at 12:46

## 2024-12-16 RX ADMIN — PANTOPRAZOLE SODIUM 40 MG: 40 TABLET, DELAYED RELEASE ORAL at 05:41

## 2024-12-16 RX ADMIN — INSULIN LISPRO 1 UNITS: 100 INJECTION, SOLUTION INTRAVENOUS; SUBCUTANEOUS at 12:46

## 2024-12-16 RX ADMIN — LATANOPROST 1 DROP: 50 SOLUTION OPHTHALMIC at 21:30

## 2024-12-16 RX ADMIN — CLOPIDOGREL BISULFATE 75 MG: 75 TABLET ORAL at 08:14

## 2024-12-16 RX ADMIN — BUDESONIDE AND FORMOTEROL FUMARATE DIHYDRATE 2 PUFF: 160; 4.5 AEROSOL RESPIRATORY (INHALATION) at 08:16

## 2024-12-16 RX ADMIN — METHENAMINE HIPPURATE 1 G: 1000 TABLET ORAL at 08:17

## 2024-12-16 RX ADMIN — NYSTATIN: 100000 POWDER TOPICAL at 16:54

## 2024-12-16 RX ADMIN — BUDESONIDE AND FORMOTEROL FUMARATE DIHYDRATE 2 PUFF: 160; 4.5 AEROSOL RESPIRATORY (INHALATION) at 16:53

## 2024-12-16 RX ADMIN — Medication 500 MCG: at 08:14

## 2024-12-16 RX ADMIN — CEFTRIAXONE 1000 MG: 10 INJECTION, POWDER, FOR SOLUTION INTRAVENOUS at 12:46

## 2024-12-16 NOTE — PROGRESS NOTES
Progress Note - Hospitalist   Name: Mathew Rico 74 y.o. male I MRN: [id number]  Unit/Bed#: [place] [address] -02 I Date of Admission: 12/14/2024   Date of Service: 12/16/2024 I Hospital Day: 2    Assessment & Plan  Stercoral colitis  Patient has issues with chronic constipation presents with severe abdominal pain thought to be due to stercoral colitis and constipation  Bowel regimen increased, currently having good bowel movements with improvement in abdominal pain  Continue miralax twice daily then titrate accordingly  Asymptomatic bacteriuria  Patient asymptomatic, urine cultures growing 80,000-89,000 CFU MDR Enterobacter cloacae  Given this was drawn from chronic suprapubic cath and patient with no other clinical findings of infection, will discontinue antbiotics and monitor off antibiotics for now  Multiple sclerosis (HCC)  Has chronic urinary retention requiring a suprapubic urine catheter  Type 2 diabetes mellitus without complication, without long-term current use of insulin ([facility])  Lab Results   Component Value Date    HGBA1C 9.0 (H) 10/06/2024       Recent Labs     12/15/24  1628 12/15/24  2037 12/16/24  0621 12/16/24  1114   POCGLU 133 242* 116 159*       Blood Sugar Average: Last 72 hrs:      Holding his outpatient Jardiance and metformin  His blood sugars are well-controlled with the Humalog insulin sliding scale here in the hospital  Neurogenic bladder  Has a chronic suprapubic catheter.  It was not draining much when he came in.  This may have also contributed to his abdominal pain.  Urology is working on it and have been flushing the catheter and it is now draining  Bladder wall thickening  Per Urology    General urologic radiographic findings seen in radiation, bladder outlet obstruction, infection and multiple diagnoses.  No evidence of bulky lymphadenopathy or suspicious masses or lesions.  Gold standard cystoscopic evaluation performed by Dr. Javy Jeffries earlier this year, without evidence of  masses or lesions.  No further urologic intervention        VTE Pharmacologic Prophylaxis: VTE Score: 5 High Risk (Score >/= 5) - Pharmacological DVT Prophylaxis Ordered: enoxaparin (Lovenox). Sequential Compression Devices Ordered.    Mobility:   Basic Mobility Inpatient Raw Score: 7  JH-HLM Goal: 2: Bed activities/Dependent transfer  JH-HLM Achieved: 1: Laying in bed  JH-HLM Goal NOT achieved. Continue with multidisciplinary rounding and encourage appropriate mobility to improve upon JH-HLM goals.    Patient Centered Rounds: I performed bedside rounds with nursing staff today.   Discussions with Specialists or Other Care Team Provider: ID    Education and Discussions with Family / Patient:  will call brother.     Current Length of Stay: 2 day(s)  Current Patient Status: Inpatient   Certification Statement: The patient will continue to require additional inpatient hospital stay due to dizziness work-up  Discharge Plan: Anticipate discharge in 24-48 hrs to home.    Code Status: Level 1 - Full Code    Subjective   Patient seen and examined at bedside. Abdominal pain improving. Complaining of dizziness that he notes to be new from weeks-months. States spinning and lightheadedness when he moves his body. Also complains of on and off vision problems    Objective :  Temp:  [97.5 °F (36.4 °C)-98 °F (36.7 °C)] 97.7 °F (36.5 °C)  HR:  [69-75] 70  BP: ()/(44-73) 110/49  Resp:  [16-18] 16  SpO2:  [96 %-98 %] 96 %  O2 Device: None (Room air)    Body mass index is 24.62 kg/m².     Input and Output Summary (last 24 hours):     Intake/Output Summary (Last 24 hours) at 12/16/2024 1600  Last data filed at 12/16/2024 1316  Gross per 24 hour   Intake 1260 ml   Output 4170 ml   Net -2910 ml       Physical Exam  Vitals and nursing note reviewed.   Constitutional:       General: He is not in acute distress.     Appearance: He is well-developed.   HENT:      Head: Normocephalic and atraumatic.   Eyes:      Conjunctiva/sclera:  Conjunctivae normal.   Cardiovascular:      Rate and Rhythm: Normal rate and regular rhythm.      Heart sounds: No murmur heard.  Pulmonary:      Effort: Pulmonary effort is normal. No respiratory distress.      Breath sounds: Normal breath sounds.   Abdominal:      Palpations: Abdomen is soft.      Tenderness: There is no abdominal tenderness.   Musculoskeletal:         General: No swelling.      Cervical back: Neck supple.   Skin:     General: Skin is warm and dry.      Capillary Refill: Capillary refill takes less than 2 seconds.   Neurological:      Mental Status: He is alert and oriented to person, place, and time. Mental status is at baseline.   Psychiatric:         Mood and Affect: Mood normal.           Lines/Drains:  Lines/Drains/Airways       Active Status       Name Placement date Placement time Site Days    Suprapubic Catheter 20 Fr. 12/10/24  1030  --  6                            Lab Results: I have reviewed the following results:   Results from last 7 days   Lab Units 12/16/24  0448   WBC Thousand/uL 9.65   HEMOGLOBIN g/dL 11.8*   HEMATOCRIT % 36.8   PLATELETS Thousands/uL 302   SEGS PCT % 47   LYMPHO PCT % 35   MONO PCT % 9   EOS PCT % 8*     Results from last 7 days   Lab Units 12/16/24  0448 12/15/24  0502 12/14/24  0444   SODIUM mmol/L 140   < > 135   POTASSIUM mmol/L 3.8   < > 4.0   CHLORIDE mmol/L 111*   < > 101   CO2 mmol/L 23   < > 23   BUN mg/dL 9   < > 17   CREATININE mg/dL 0.79   < > 0.98   ANION GAP mmol/L 6   < > 11   CALCIUM mg/dL 8.5   < > 10.0   ALBUMIN g/dL  --   --  4.2   TOTAL BILIRUBIN mg/dL  --   --  0.59   ALK PHOS U/L  --   --  90   ALT U/L  --   --  8   AST U/L  --   --  10*   GLUCOSE RANDOM mg/dL 128   < > 126    < > = values in this interval not displayed.     Results from last 7 days   Lab Units 12/14/24  0919   INR  0.95     Results from last 7 days   Lab Units 12/16/24  1114 12/16/24  0621 12/15/24  2037 12/15/24  1628 12/15/24  1145 12/15/24  0747 12/14/24 2116  12/14/24  1608 12/14/24  1201   POC GLUCOSE mg/dl 159* 116 242* 133 156* 101 127 156* 99         Results from last 7 days   Lab Units 12/14/24  0919   LACTIC ACID mmol/L 1.2       Recent Cultures (last 7 days):   Results from last 7 days   Lab Units 12/14/24  1211 12/14/24  0919   BLOOD CULTURE   --  No Growth at 48 hrs.  No Growth at 48 hrs.   URINE CULTURE  80,000-89,000 cfu/ml Enterobacter cloacae*  50,000-59,000 cfu/ml Lactobacillus species*  --        Imaging Results Review: I reviewed radiology reports from this admission including: CT abdomen/pelvis.  Other Study Results Review: No additional pertinent studies reviewed.    Last 24 Hours Medication List:     Current Facility-Administered Medications:     acetaminophen (TYLENOL) tablet 650 mg, Q6H PRN    albuterol inhalation solution 2.5 mg, Q6H PRN    amLODIPine (NORVASC) tablet 10 mg, Daily **AND** atorvastatin (LIPITOR) tablet 80 mg, Daily    baclofen tablet 5 mg, TID    budesonide-formoterol (SYMBICORT) 160-4.5 mcg/act inhaler 2 puff, BID    clopidogrel (PLAVIX) tablet 75 mg, Daily    cyanocobalamin (VITAMIN B-12) tablet 500 mcg, Daily    enoxaparin (LOVENOX) subcutaneous injection 40 mg, Daily    gabapentin (NEURONTIN) capsule 300 mg, TID    gabapentin (NEURONTIN) capsule 600 mg, HS    insulin lispro (HumALOG/ADMELOG) 100 units/mL subcutaneous injection 1-5 Units, HS    insulin lispro (HumALOG/ADMELOG) 100 units/mL subcutaneous injection 1-6 Units, TID AC **AND** Fingerstick Glucose (POCT), TID AC    latanoprost (XALATAN) 0.005 % ophthalmic solution 1 drop, HS    lidocaine (LIDODERM) 5 % patch 1 patch, Daily    meclizine (ANTIVERT) tablet 12.5 mg, Q8H PRN    melatonin tablet 3 mg, HS PRN    methenamine hippurate (HIPREX) tablet 1 g, BID With Meals    mirtazapine (REMERON) tablet 7.5 mg, HS    morphine injection 1 mg, Q4H PRN    nystatin (MYCOSTATIN) powder, BID    ondansetron (ZOFRAN) injection 4 mg, Q6H PRN    pantoprazole (PROTONIX) EC tablet 40 mg,  Daily Before Breakfast    propranolol (INDERAL LA) 24 hr capsule 60 mg, Daily    senna-docusate sodium (SENOKOT S) 8.6-50 mg per tablet 2 tablet, HS    traMADol (ULTRAM) tablet 50 mg, Q6H PRN    Administrative Statements   Today, Patient Was Seen By: Beatrice Plunkett MD      **Please Note: This note may have been constructed using a voice recognition system.**

## 2024-12-16 NOTE — PLAN OF CARE
Problem: Prexisting or High Potential for Compromised Skin Integrity  Goal: Skin integrity is maintained or improved  Description: INTERVENTIONS:  - Identify patients at risk for skin breakdown  - Assess and monitor skin integrity  - Assess and monitor nutrition and hydration status  - Monitor labs   - Assess for incontinence   - Turn and reposition patient  - Assist with mobility/ambulation  - Relieve pressure over bony prominences  - Avoid friction and shearing  - Provide appropriate hygiene as needed including keeping skin clean and dry  - Evaluate need for skin moisturizer/barrier cream  - Collaborate with interdisciplinary team   - Patient/family teaching  - Consider wound care consult   12/15/2024 2059 by Sayda Mallory RN  Outcome: Progressing  12/15/2024 2055 by Sayda Mallory RN  Outcome: Progressing     Problem: PAIN - ADULT  Goal: Verbalizes/displays adequate comfort level or baseline comfort level  Description: Interventions:  - Encourage patient to monitor pain and request assistance  - Assess pain using appropriate pain scale  - Administer analgesics based on type and severity of pain and evaluate response  - Implement non-pharmacological measures as appropriate and evaluate response  - Consider cultural and social influences on pain and pain management  - Notify physician/advanced practitioner if interventions unsuccessful or patient reports new pain  12/15/2024 2059 by Sayda Mallory RN  Outcome: Progressing  12/15/2024 2055 by Sayda Mallory RN  Outcome: Progressing     Problem: INFECTION - ADULT  Goal: Absence or prevention of progression during hospitalization  Description: INTERVENTIONS:  - Assess and monitor for signs and symptoms of infection  - Monitor lab/diagnostic results  - Monitor all insertion sites, i.e. indwelling lines, tubes, and drains  - Monitor endotracheal if appropriate and nasal secretions for changes in amount and color  - [place] appropriate cooling/warming  therapies per order  - Administer medications as ordered  - Instruct and encourage patient and family to use good hand hygiene technique  - Identify and instruct in appropriate isolation precautions for identified infection/condition  12/15/2024 2059 by Sayda Mallory RN  Outcome: Progressing  12/15/2024 2055 by Sayda Mallory RN  Outcome: Progressing  Goal: Absence of fever/infection during neutropenic period  Description: INTERVENTIONS:  - Monitor WBC    12/15/2024 2059 by Sayda Mallory RN  Outcome: Progressing  12/15/2024 2055 by Sayda Mallory RN  Outcome: Progressing     Problem: SAFETY ADULT  Goal: Patient will remain free of falls  Description: INTERVENTIONS:  - Educate patient/family on patient safety including physical limitations  - Instruct patient to call for assistance with activity   - Consult OT/PT to assist with strengthening/mobility   - Keep Call bell within reach  - Keep bed low and locked with side rails adjusted as appropriate  - Keep care items and personal belongings within reach  - Initiate and maintain comfort rounds  - Make Fall Risk Sign visible to staff  - Offer Toileting every 2 Hours, in advance of need  - Initiate/Maintain bed alarm  - Apply yellow socks and bracelet for high fall risk patients  - Consider moving patient to room near nurses station  12/15/2024 2059 by Sayda Mallory RN  Outcome: Progressing  12/15/2024 2055 by Sayda Mallory RN  Outcome: Progressing  Goal: Maintain or return to baseline ADL function  Description: INTERVENTIONS:  -  Assess patient's ability to carry out ADLs; assess patient's baseline for ADL function and identify physical deficits which impact ability to perform ADLs (bathing, care of mouth/teeth, toileting, grooming, dressing, etc.)  - Assess/evaluate cause of self-care deficits   - Assess range of motion  - Assess patient's mobility; develop plan if impaired  - Assess patient's need for assistive devices and provide as  appropriate  - Encourage maximum independence but intervene and supervise when necessary  - Involve family in performance of ADLs  - Assess for home care needs following discharge   - Consider OT consult to assist with ADL evaluation and planning for discharge  - Provide patient education as appropriate  12/15/2024 2059 by Sayda Mallory RN  Outcome: Progressing  12/15/2024 2055 by Sayda Mallory RN  Outcome: Progressing  Goal: Maintains/Returns to pre admission functional level  Description: INTERVENTIONS:  - Perform AM-PAC 6 Click Basic Mobility/ Daily Activity assessment daily.  - Set and communicate daily mobility goal to care team and patient/family/caregiver.   - Collaborate with rehabilitation services on mobility goals if consulted  - Perform Range of Motion 2 times a day.  - Reposition patient every 2 hours  - Out of bed to chair 3 times a day   - Out of bed for meals 3 times a day  - Out of bed for toileting  - Record patient progress and toleration of activity level   12/15/2024 2059 by Sayda Mallory RN  Outcome: Progressing  12/15/2024 2055 by Sayda Mallory RN  Outcome: Progressing     Problem: DISCHARGE PLANNING  Goal: Discharge to home or other facility with appropriate resources  Description: INTERVENTIONS:  - Identify barriers to discharge w/patient and caregiver  - Arrange for needed discharge resources and transportation as appropriate  - Identify discharge learning needs (meds, wound care, etc.)  - Arrange for interpretive services to assist at discharge as needed  - Refer to Case Management Department for coordinating discharge planning if the patient needs post-hospital services based on physician/advanced practitioner order or complex needs related to functional status, cognitive ability, or social support system  12/15/2024 2059 by Sayda Mallory RN  Outcome: Progressing  12/15/2024 2055 by Sayda Mallory RN  Outcome: Progressing     Problem: Knowledge Deficit  Goal:  Patient/family/caregiver demonstrates understanding of disease process, treatment plan, medications, and discharge instructions  Description: Complete learning assessment and assess knowledge base.  Interventions:  - Provide teaching at level of understanding  - Provide teaching via preferred learning methods  12/15/2024 2059 by Sayda Mallory RN  Outcome: Progressing  12/15/2024 2055 by Sayda Mallory RN  Outcome: Progressing

## 2024-12-16 NOTE — ASSESSMENT & PLAN NOTE
Per Urology    General urologic radiographic findings seen in radiation, bladder outlet obstruction, infection and multiple diagnoses.  No evidence of bulky lymphadenopathy or suspicious masses or lesions.  Gold standard cystoscopic evaluation performed by Dr. Javy Jeffries earlier this year, without evidence of masses or lesions.  No further urologic intervention

## 2024-12-16 NOTE — ASSESSMENT & PLAN NOTE
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

## 2024-12-16 NOTE — ASSESSMENT & PLAN NOTE
Patient asymptomatic, urine cultures growing 80,000-89,000 CFU MDR Enterobacter cloacae  Given this was drawn from chronic suprapubic cath and patient with no other clinical findings of infection, will discontinue antbiotics and monitor off antibiotics for now

## 2024-12-16 NOTE — PLAN OF CARE
Problem: Prexisting or High Potential for Compromised Skin Integrity  Goal: Skin integrity is maintained or improved  Description: INTERVENTIONS:  - Identify patients at risk for skin breakdown  - Assess and monitor skin integrity  - Assess and monitor nutrition and hydration status  - Monitor labs   - Assess for incontinence   - Turn and reposition patient  - Assist with mobility/ambulation  - Relieve pressure over bony prominences  - Avoid friction and shearing  - Provide appropriate hygiene as needed including keeping skin clean and dry  - Evaluate need for skin moisturizer/barrier cream  - Collaborate with interdisciplinary team   - Patient/family teaching  - Consider wound care consult   Outcome: Progressing     Problem: PAIN - ADULT  Goal: Verbalizes/displays adequate comfort level or baseline comfort level  Description: Interventions:  - Encourage patient to monitor pain and request assistance  - Assess pain using appropriate pain scale  - Administer analgesics based on type and severity of pain and evaluate response  - Implement non-pharmacological measures as appropriate and evaluate response  - Consider cultural and social influences on pain and pain management  - Notify physician/advanced practitioner if interventions unsuccessful or patient reports new pain  Outcome: Progressing     Problem: INFECTION - ADULT  Goal: Absence or prevention of progression during hospitalization  Description: INTERVENTIONS:  - Assess and monitor for signs and symptoms of infection  - Monitor lab/diagnostic results  - Monitor all insertion sites, i.e. indwelling lines, tubes, and drains  - Monitor endotracheal if appropriate and nasal secretions for changes in amount and color  - [place] appropriate cooling/warming therapies per order  - Administer medications as ordered  - Instruct and encourage patient and family to use good hand hygiene technique  - Identify and instruct in appropriate isolation precautions for  identified infection/condition  Outcome: Progressing  Goal: Absence of fever/infection during neutropenic period  Description: INTERVENTIONS:  - Monitor WBC    Outcome: Progressing     Problem: SAFETY ADULT  Goal: Patient will remain free of falls  Description: INTERVENTIONS:  - Educate patient/family on patient safety including physical limitations  - Instruct patient to call for assistance with activity   - Consult OT/PT to assist with strengthening/mobility   - Keep Call bell within reach  - Keep bed low and locked with side rails adjusted as appropriate  - Keep care items and personal belongings within reach  - Initiate and maintain comfort rounds  - Make Fall Risk Sign visible to staff  - Offer Toileting every 2 Hours, in advance of need  - Initiate/Maintain bed alarm  - Apply yellow socks and bracelet for high fall risk patients  - Consider moving patient to room near nurses station  Outcome: Progressing  Goal: Maintain or return to baseline ADL function  Description: INTERVENTIONS:  -  Assess patient's ability to carry out ADLs; assess patient's baseline for ADL function and identify physical deficits which impact ability to perform ADLs (bathing, care of mouth/teeth, toileting, grooming, dressing, etc.)  - Assess/evaluate cause of self-care deficits   - Assess range of motion  - Assess patient's mobility; develop plan if impaired  - Assess patient's need for assistive devices and provide as appropriate  - Encourage maximum independence but intervene and supervise when necessary  - Involve family in performance of ADLs  - Assess for home care needs following discharge   - Consider OT consult to assist with ADL evaluation and planning for discharge  - Provide patient education as appropriate  Outcome: Progressing  Goal: Maintains/Returns to pre admission functional level  Description: INTERVENTIONS:  - Perform AM-PAC 6 Click Basic Mobility/ Daily Activity assessment daily.  - Set and communicate daily mobility  goal to care team and patient/family/caregiver.   - Collaborate with rehabilitation services on mobility goals if consulted  - Perform Range of Motion 2 times a day.  - Reposition patient every 2 hours  - Out of bed to chair 3 times a day   - Out of bed for meals 3 times a day  - Out of bed for toileting  - Record patient progress and toleration of activity level   Outcome: Progressing     Problem: DISCHARGE PLANNING  Goal: Discharge to home or other facility with appropriate resources  Description: INTERVENTIONS:  - Identify barriers to discharge w/patient and caregiver  - Arrange for needed discharge resources and transportation as appropriate  - Identify discharge learning needs (meds, wound care, etc.)  - Arrange for interpretive services to assist at discharge as needed  - Refer to Case Management Department for coordinating discharge planning if the patient needs post-hospital services based on physician/advanced practitioner order or complex needs related to functional status, cognitive ability, or social support system  Outcome: Progressing     Problem: Knowledge Deficit  Goal: Patient/family/caregiver demonstrates understanding of disease process, treatment plan, medications, and discharge instructions  Description: Complete learning assessment and assess knowledge base.  Interventions:  - Provide teaching at level of understanding  - Provide teaching via preferred learning methods  Outcome: Progressing     Problem: Nutrition/Hydration-ADULT  Goal: Nutrient/Hydration intake appropriate for improving, restoring or maintaining nutritional needs  Description: Monitor and assess patient's nutrition/hydration status for malnutrition. Collaborate with interdisciplinary team and initiate plan and interventions as ordered.  Monitor patient's weight and dietary intake as ordered or per policy. Utilize nutrition screening tool and intervene as necessary. Determine patient's food preferences and provide high-protein,  high-caloric foods as appropriate.     INTERVENTIONS:  - Monitor oral intake, urinary output, labs, and treatment plans  - Assess nutrition and hydration status and recommend course of action  - Evaluate amount of meals eaten  - Assist patient with eating if necessary   - Allow adequate time for meals  - Recommend/ encourage appropriate diets, oral nutritional supplements, and vitamin/mineral supplements  - Order, calculate, and assess calorie counts as needed  - Recommend, monitor, and adjust tube feedings and TPN/PPN based on assessed needs  - Assess need for intravenous fluids  - Provide specific nutrition/hydration education as appropriate  - Include patient/family/caregiver in decisions related to nutrition  Outcome: Progressing     Problem: NEUROSENSORY - ADULT  Goal: Achieves maximal functionality and self care  Description: INTERVENTIONS  - Monitor swallowing and airway patency with patient fatigue and changes in neurological status  - Encourage and assist patient to increase activity and self care.   - Encourage visually impaired, hearing impaired and aphasic patients to use assistive/communication devices  Outcome: Progressing     Problem: GASTROINTESTINAL - ADULT  Goal: Minimal or absence of nausea and/or vomiting  Description: INTERVENTIONS:  - Administer IV fluids if ordered to ensure adequate hydration  - Maintain NPO status until nausea and vomiting are resolved  - Nasogastric tube if ordered  - Administer ordered antiemetic medications as needed  - Provide nonpharmacologic comfort measures as appropriate  - Advance diet as tolerated, if ordered  - Consider nutrition services referral to assist patient with adequate nutrition and appropriate food choices  Outcome: Progressing  Goal: Maintains or returns to baseline bowel function  Description: INTERVENTIONS:  - Assess bowel function  - Encourage oral fluids to ensure adequate hydration  - Administer IV fluids if ordered to ensure adequate hydration  -  Administer ordered medications as needed  - Encourage mobilization and activity  - Consider nutritional services referral to assist patient with adequate nutrition and appropriate food choices  Outcome: Progressing  Goal: Maintains adequate nutritional intake  Description: INTERVENTIONS:  - Monitor percentage of each meal consumed  - Identify factors contributing to decreased intake, treat as appropriate  - Assist with meals as needed  - Monitor I&O, weight, and lab values if indicated  - Obtain nutrition services referral as needed  Outcome: Progressing     Problem: GENITOURINARY - ADULT  Goal: Maintains or returns to baseline urinary function  Description: INTERVENTIONS:  - Assess urinary function  - Encourage oral fluids to ensure adequate hydration if ordered  - Administer IV fluids as ordered to ensure adequate hydration  - Administer ordered medications as needed  - Offer frequent toileting  - Follow urinary retention protocol if ordered  Outcome: Progressing  Goal: Absence of urinary retention  Description: INTERVENTIONS:  - Assess patient’s ability to void and empty bladder  - Monitor I/O  - Bladder scan as needed  - Discuss with physician/AP medications to alleviate retention as needed  - Discuss catheterization for long term situations as appropriate  Outcome: Progressing  Goal: Urinary catheter remains patent  Description: INTERVENTIONS:  - Assess patency of urinary catheter  - If patient has a chronic dela cruz, consider changing catheter if non-functioning  - Follow guidelines for intermittent irrigation of non-functioning urinary catheter  Outcome: Progressing     Problem: METABOLIC, FLUID AND ELECTROLYTES - ADULT  Goal: Electrolytes maintained within normal limits  Description: INTERVENTIONS:  - Monitor labs and assess patient for signs and symptoms of electrolyte imbalances  - Administer electrolyte replacement as ordered  - Monitor response to electrolyte replacements, including repeat lab results as  appropriate  - Instruct patient on fluid and nutrition as appropriate  Outcome: Progressing  Goal: Fluid balance maintained  Description: INTERVENTIONS:  - Monitor labs   - Monitor I/O and WT  - Instruct patient on fluid and nutrition as appropriate  - Assess for signs & symptoms of volume excess or deficit  Outcome: Progressing  Goal: Glucose maintained within target range  Description: INTERVENTIONS:  - Monitor Blood Glucose as ordered  - Assess for signs and symptoms of hyperglycemia and hypoglycemia  - Administer ordered medications to maintain glucose within target range  - Assess nutritional intake and initiate nutrition service referral as needed  Outcome: Progressing     Problem: SKIN/TISSUE INTEGRITY - ADULT  Goal: Skin Integrity remains intact(Skin Breakdown Prevention)  Description: Assess:  -Perform Guille assessment   -Clean and moisturize skin  -Inspect skin when repositioning, toileting, and assisting with ADLS  -Assess under medical devices  -Assess extremities for adequate circulation and sensation     Bed Management:  -Have minimal linens on bed & keep smooth, unwrinkled  -Change linens as needed when moist or perspiring  -Avoid sitting or lying in one position for more than 2 hours while in bed  -Keep HOB at 45 degrees     Toileting:  -Offer bedside commode  -Assess for incontinence  -Use incontinent care products after each incontinent episode     Activity:  -Mobilize patient 3 times a day  -Encourage activity and walks on unit  -Encourage or provide ROM exercises   -Turn and reposition patient every 2 Hours  -Use appropriate equipment to lift or move patient in bed  -Instruct/ Assist with weight shifting every 2 when out of bed in chair  -Consider limitation of chair time 2 hour intervals    Skin Care:  -Avoid use of baby powder, tape, friction and shearing, hot water or constrictive clothing  -Relieve pressure over bony prominences  -Do not massage red bony areas    Next Steps:  -Teach patient  strategies to minimize risks    -Consider consults to  interdisciplinary teams   Outcome: Progressing     Problem: MUSCULOSKELETAL - ADULT  Goal: Maintain or return mobility to safest level of function  Description: INTERVENTIONS:  - Assess patient's ability to carry out ADLs; assess patient's baseline for ADL function and identify physical deficits which impact ability to perform ADLs (bathing, care of mouth/teeth, toileting, grooming, dressing, etc.)  - Assess/evaluate cause of self-care deficits   - Assess range of motion  - Assess patient's mobility  - Assess patient's need for assistive devices and provide as appropriate  - Encourage maximum independence but intervene and supervise when necessary  - Involve family in performance of ADLs  - Assess for home care needs following discharge   - Consider OT consult to assist with ADL evaluation and planning for discharge  - Provide patient education as appropriate  Outcome: Progressing

## 2024-12-16 NOTE — UTILIZATION REVIEW
Initial Clinical Review    Admission: Date/Time/Statement:   Admission Orders (From admission, onward)       Ordered        12/14/24 0856  INPATIENT ADMISSION  Once                          Orders Placed This Encounter   Procedures    INPATIENT ADMISSION     Standing Status:   Standing     Number of Occurrences:   1     Level of Care:   Med Surg [16]     Estimated length of stay:   More than 2 Midnights     Certification:   I certify that inpatient services are medically necessary for this patient for a duration of greater than two midnights. See H&P and MD Progress Notes for additional information about the patient's course of treatment.     ED Arrival Information       Expected   -    Arrival   12/14/2024 04:21    Acuity   Urgent              Means of arrival   Ambulance    Escorted by   [facility] ([facility])    Service   Hospitalist    Admission type   Emergency              Arrival complaint   abdominal pain             Chief Complaint   Patient presents with    Abdominal Pain     Patient states that his stomach started hurting this morning and has gotten worse over the past couple hours. Endorses nausea without vomiting, he states that he feels dizzy.       Initial Presentation: 74 y.o. male w/extensive pmhx including multiple sclerosis, dm, chronic suprapubic catheter, chronic constipation to ED from nursing facility by EMS Admitted as inpatient due to stercoral colitis. Presented with worsening acute 5/10 RLQ abd pain associated with nausea and dizziness pta. Able to eat/drink. No bm in a few days, exam reveals RLQ tenderness. Wbc >15. Giving 1 dose lactulose, bowel regimen will include q8h mineral oil enemas, bid miralax and senokot. Also has acute cystitis, urine c&s sent, empiric IV rocephin started. /hr in progress. Urology and GI consulted.     Per GI: stercoral colitis with chronic constipation and colonic and bladder distension. Bowel regimen, drain bladder, send urine culture.      Per urology: neurogenic bladder, suprapubic tube dependent, tube exchanged on 12/10 by urology staff. Tube now with  yellow cloudy urine. Dx: acute cystitis after recent exchange, afebrile, wbc 15, await urine results. Continue empiric IV antbx.     Date: 12/15   Day 2: now having good bm's after bowel regimen was doubled from his usual regimen. Afebrile. Abdomen is non tender. He might have a UTI-ER was unable to obtain a urine sample as suprapubic cath wasn't draining yesterday. Continue empiric IV rocephin. Urology flushed the suprapubic catheter and it is now draining. Await urine culture to determine the need to continue antbx. Mg 1.5, wbc 14.21. remains on /hr. GI following and decision to give enemas x 2 days-miralax bowel prep and bid miralax, does not need endoscopy.     Date: 12/16  Day 3: Has surpassed a 2nd midnight with active treatments and services.Patient's prior severe pain is thought to be due to stercoral colitis and constipation. Bowel regimen increased, continue bid miralax with titration. Urine c&s growing enterobacter cloacae, patient has no UTI sx, since this was obtained from suprapubic catheter, will dc antbx. IV mg 2gm given x 1 to replete. IVF continued through the afternoon at 100/hr.   Certification Statement: The patient will continue to require additional inpatient hospital stay due to dizziness work-up       ED Treatment-Medication Administration from 12/14/2024 0421 to 12/14/2024 1018         Date/Time Order Dose Route Action     12/14/2024 0440 HYDROmorphone HCl (DILAUDID) injection 0.2 mg 0.2 mg Intravenous Given     12/14/2024 0530 iohexol (OMNIPAQUE) 350 MG/ML injection (MULTI-DOSE) 100 mL 100 mL Intravenous Given            Scheduled Medications:  amLODIPine, 10 mg, Oral, Daily   And  atorvastatin, 80 mg, Oral, Daily  baclofen, 5 mg, Oral, TID  budesonide-formoterol, 2 puff, Inhalation, BID  clopidogrel, 75 mg, Oral, Daily  cyanocobalamin, 500 mcg, Oral,  Daily  enoxaparin, 40 mg, Subcutaneous, Daily  gabapentin, 300 mg, Oral, TID  gabapentin, 600 mg, Oral, HS  insulin lispro, 1-5 Units, Subcutaneous, HS  insulin lispro, 1-6 Units, Subcutaneous, TID AC  latanoprost, 1 drop, Both Eyes, HS  lidocaine, 1 patch, Topical, Daily  methenamine hippurate, 1 g, Oral, BID With Meals  mirtazapine, 7.5 mg, Oral, HS  nystatin, , Topical, BID  pantoprazole, 40 mg, Oral, Daily Before Breakfast  propranolol, 60 mg, Oral, Daily  senna-docusate sodium, 2 tablet, Oral, HS    cefTRIAXone (ROCEPHIN) 1,000 mg in dextrose 5 % 50 mL IVPB  Dose: 1,000 mg  Freq: Every 24 hours Route: IV  Last Dose: Stopped (12/16/24 1316)  Start: 12/15/24 1200 End: 12/16/24 1452    magnesium sulfate 2 g/50 mL IVPB (premix) 2 g  Dose: 2 g  Freq: Once Route: IV  Last Dose: Stopped (12/16/24 1224)  Start: 12/16/24 0845 End: 12/16/24 1224    mineral oil enema 1 enema  Dose: 1 enema  Freq: Every 8 hours Route: RE  Start: 12/14/24 1445 End: 12/16/24 1444         Continuous IV Infusions:  sodium chloride 0.9 % infusion  Rate: 100 mL/hr Dose: 100 mL/hr  Freq: Continuous Route: IV  Last Dose: 100 mL/hr (12/16/24 0814)  Start: 12/14/24 1000 End: 12/16/24 1512        PRN Meds:  acetaminophen, 650 mg, Oral, Q6H PRN x 2 12/15  albuterol, 2.5 mg, Nebulization, Q6H PRN  meclizine, 12.5 mg, Oral, Q8H PRN  melatonin, 3 mg, Oral, HS PRN  morphine injection, 1 mg, Intravenous, Q4H PRN  ondansetron, 4 mg, Intravenous, Q6H PRN  traMADol, 50 mg, Oral, Q6H PRN      ED Triage Vitals [12/14/24 0426]   Temperature Pulse Respirations Blood Pressure SpO2 Pain Score   98 °F (36.7 °C) 98 18 168/83 96 % 6     Weight (last 2 days)       Date/Time Weight    12/14/24 10:25:31 67.1 (147.93)            Vital Signs (last 3 days)       Date/Time Temp Pulse Resp BP MAP (mmHg) SpO2 O2 Device Patient Position - Orthostatic VS Annamarie Coma Scale Score Pain    12/16/24 15:12:03 97.7 °F (36.5 °C) 70 16 110/49 69 96 % -- -- -- --    12/16/24 0800 -- --  -- -- -- 96 % None (Room air) -- 14 No Pain    12/16/24 07:33:07 98 °F (36.7 °C) 71 16 130/73 92 96 % None (Room air) Lying -- --    12/15/24 2115 -- -- -- -- -- -- -- -- -- 5    12/15/24 2053 -- -- -- 122/56 -- -- -- -- -- No Pain    12/15/24 20:41:12 97.5 °F (36.4 °C) 75 18 116/47 70 96 % -- -- -- --    12/15/24 16:29:50 97.5 °F (36.4 °C) 69 16 99/44 62 98 % -- -- -- --    12/15/24 0822 -- -- -- -- -- -- -- -- -- 5    12/15/24 07:44:34 97.3 °F (36.3 °C) 81 17 132/54 80 95 % -- -- -- --    12/15/24 0728 -- -- -- -- -- -- None (Room air) -- -- 2    12/14/24 21:18:37 97.7 °F (36.5 °C) 84 -- 124/52 76 96 % -- -- -- --    12/14/24 21:05:05 98 °F (36.7 °C) 79 16 125/53 77 97 % -- -- -- --    12/14/24 2021 -- -- -- -- -- -- -- -- -- No Pain    12/14/24 17:30:39 -- 93 -- 113/64 80 91 % -- -- -- --    12/14/24 14:58:09 98.4 °F (36.9 °C) 107 20 163/76 105 93 % -- -- -- --    12/14/24 1111 -- -- -- -- -- -- -- -- -- 3    12/14/24 10:25:31 96 °F (35.6 °C) 96 16 151/74 100 94 % -- -- -- --    12/14/24 0755 -- 98 18 133/63 -- 98 % None (Room air) Lying -- --    12/14/24 0515 -- -- -- -- -- -- -- -- -- 4    12/14/24 0440 -- -- -- -- -- -- -- -- -- 6    12/14/24 0426 98 °F (36.7 °C) 98 18 168/83 -- 96 % None (Room air) Lying -- 6              Pertinent Labs/Diagnostic Test Results:   Radiology:  CT abdomen pelvis with contrast   Final Interpretation by Sandy Martínez MD (12/14 5936)      Findings consistent with acute cystitis.      Constipation, probable fecal impaction, and mild circumferential wall thickening of the rectum with mild presacral edema; developing stercoral proctitis is not excluded.      The study was marked in EPIC for immediate notification.         Workstation performed: [id number]         XR chest 1 view portable   ED Interpretation by Brendon Pepper DO (12/14 8528)   No acute cardiopulmonary disease      Final Interpretation by Chance Oates MD (12/14 8483)      No acute cardiopulmonary disease.             Resident: Christopher Griffith I, the attending radiologist, have reviewed the images and agree with the final report above.      Workstation performed: [id number]           Cardiology:  ECG 12 lead   Final Result by Elver Christine DO (12/16 0813)   Normal sinus rhythm   Normal ECG   When compared with ECG of 22-Nov-2024 07:25,   No significant change was found   Confirmed by Elver Christine ([id number]) on 12/16/2024 8:13:24 AM        Results from last 7 days   Lab Units 12/16/24 0448 12/15/24  0502 12/14/24  0444   WBC Thousand/uL 9.65 14.21* 15.35*   HEMOGLOBIN g/dL 11.8* 13.0 15.7   HEMATOCRIT % 36.8 40.8 48.3   PLATELETS Thousands/uL 302 317 400*   TOTAL NEUT ABS Thousands/µL 4.62 8.63* 11.20*         Results from last 7 days   Lab Units 12/16/24 0448 12/15/24  0502 12/14/24  0444   SODIUM mmol/L 140 138 135   POTASSIUM mmol/L 3.8 3.8 4.0   CHLORIDE mmol/L 111* 106 101   CO2 mmol/L 23 25 23   ANION GAP mmol/L 6 7 11   BUN mg/dL 9 10 17   CREATININE mg/dL 0.79 0.92 0.98   EGFR ml/min/1.73sq m 87 81 75   CALCIUM mg/dL 8.5 9.0 10.0   MAGNESIUM mg/dL 1.6* 1.6*  --      Results from last 7 days   Lab Units 12/14/24  0444   AST U/L 10*   ALT U/L 8   ALK PHOS U/L 90   TOTAL PROTEIN g/dL 8.5*   ALBUMIN g/dL 4.2   TOTAL BILIRUBIN mg/dL 0.59     Results from last 7 days   Lab Units 12/16/24  1607 12/16/24  1114 12/16/24  0621 12/15/24  2037 12/15/24  1628 12/15/24  1145 12/15/24  0747 12/14/24  2116 12/14/24  1608 12/14/24  1201   POC GLUCOSE mg/dl 152* 159* 116 242* 133 156* 101 127 156* 99     Results from last 7 days   Lab Units 12/16/24  0448 12/15/24  0502 12/14/24  0444   GLUCOSE RANDOM mg/dL 128 112 126       Results from last 7 days   Lab Units 12/14/24  0444   HS TNI 0HR ng/L 4         Results from last 7 days   Lab Units 12/14/24  0919   PROTIME seconds 12.9   INR  0.95   PTT seconds 26             Results from last 7 days   Lab Units 12/14/24  0919   LACTIC ACID mmol/L 1.2     Results from last  7 days   Lab Units 12/14/24  0444   LIPASE u/L 10*       Results from last 7 days   Lab Units 12/14/24  1211 12/14/24  0919   BLOOD CULTURE   --  No Growth at 48 hrs.  No Growth at 48 hrs.   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[facility])     Bladder infection     Bronchitis     Constipation     Cough     Diabetes ([facility])     Diabetes mellitus ([facility])     Dizziness     Dysfunction of eustachian tube     Erectile dysfunction of non-organic origin     Fatigue     Glaucoma     Hiatal hernia     Hypertension     Imbalance     Leg muscle spasm     Leukocytosis 04/01/2024    MS (multiple sclerosis) ([facility])     Multiple sclerosis ([facility])     Nephrolithiasis     Neurogenic bladder     No natural teeth     Sinus pain     Spinal stenosis     Strain of thoracic region     Stroke ([facility])     Suprapubic catheter ([facility])      Present on Admission:   Stercoral colitis   Asymptomatic bacteriuria   Multiple sclerosis ([facility])   Type 2 diabetes mellitus without complication, without long-term current use of insulin ([facility])   Neurogenic bladder      Admitting Diagnosis: Abdominal pain [R10.9]  Cystitis [N30.90]  Bladder wall thickening [N32.89]  Stercoral colitis [K52.89]  Age/Sex: 74 y.o. male    Network Utilization Review Department  ATTENTION: Please call with any questions or concerns to [phone] and carefully listen to the prompts so that you are directed to the right person. All voicemails are confidential.   For Discharge needs, contact Care Management DC Support Team at [phone] opt. 2  Send all requests for admission clinical reviews, approved or denied determinations and any other requests to dedicated fax number below belonging to the campus where the patient is receiving  treatment. List of dedicated fax numbers for the Facilities:  FACILITY NAME UR FAX NUMBER   ADMISSION DENIALS (Administrative/Medical Necessity) [phone]   DISCHARGE SUPPORT TEAM (NETWORK) [phone]   PARENT CHILD HEALTH (Maternity/NICU/Pediatrics) [phone]   [facility] [phone]   [facility] [phone]   [facility] [phone]   [facility] [phone]   [facility] [phone]   [facility] [phone]   [facility] [phone]   [facility] [phone]   [facility] [phone]   [facility] [phone]   [facility] [phone]   [facility] [phone]

## 2024-12-16 NOTE — UTILIZATION REVIEW
Notification of Unplanned, Urgent, or   Emergency Inpatient Admission   AUTHORIZATION REQUEST   Admitting Facility [place] [address]  Tax ID: [id number]  NPI: [id number]  Place of Service: Acute [facility]  Admission Level of Care: Inpatient  Place of Service Code: 21     Attending Physician Information  Attending Name and NPI#: Beatrice Wang Md [[id number]]  Phone: [phone]     Admission Information  Inpatient Admission Date/Time: 12/14/24  8:56 AM  Discharge Date/Time: No discharge date for patient encounter.  Admitting Diagnosis Code/Description:  Abdominal pain [R10.9]  Cystitis [N30.90]  Bladder wall thickening [N32.89]  Stercoral colitis [K52.89]     Utilization Review Contact  Marsha Garcia Utilization   Phone: [phone]  Fax: [phone]  Email: [email or web address]  Contact for approvals/pending authorizations, clinical reviews, and discharge.     Physician Advisory Services Contact  Medical Necessity Denial & Tvth-qi-Lors Discussion  Phone: [phone]  Fax: [phone]  Email: [email or web address]     DISCHARGE SUPPORT TEAM:  For Patients Discharge Needs & Updates  Phone: [phone] opt. 2 Fax: [phone]  Email: [email or web address]

## 2024-12-16 NOTE — ASSESSMENT & PLAN NOTE
Lab Results   Component Value Date    HGBA1C 9.0 (H) 10/06/2024       Recent Labs     12/15/24  1628 12/15/24  2037 12/16/24  0621 12/16/24  1114   POCGLU 133 242* 116 159*       Blood Sugar Average: Last 72 hrs:      Holding his outpatient Jardiance and metformin  His blood sugars are well-controlled with the Humalog insulin sliding scale here in the hospital

## 2024-12-16 NOTE — CASE MANAGEMENT
Case Management Progress Note    Patient name Mathew Rico  Location [facility] M* MRN [id number]  : 1950 Date 2024       LOS (days): 2  Geometric Mean LOS (GMLOS) (days): 2.8  Days to GMLOS:0.8        OBJECTIVE:        Current admission status: Inpatient      PROGRESS NOTE: CM call to [facility] speaking to Pily tracy on admission and current status.  Will need to send AVS via fax [phone] on d/c.  Medications to be obtained if needed from [facility] themselves.

## 2024-12-16 NOTE — ASSESSMENT & PLAN NOTE
Has a chronic suprapubic catheter.  It was not draining much when he came in.  This may have also contributed to his abdominal pain.  Urology is working on it and have been flushing the catheter and it is now draining

## 2024-12-16 NOTE — PLAN OF CARE
Problem: Prexisting or High Potential for Compromised Skin Integrity  Goal: Skin integrity is maintained or improved  Description: INTERVENTIONS:  - Identify patients at risk for skin breakdown  - Assess and monitor skin integrity  - Assess and monitor nutrition and hydration status  - Monitor labs   - Assess for incontinence   - Turn and reposition patient  - Assist with mobility/ambulation  - Relieve pressure over bony prominences  - Avoid friction and shearing  - Provide appropriate hygiene as needed including keeping skin clean and dry  - Evaluate need for skin moisturizer/barrier cream  - Collaborate with interdisciplinary team   - Patient/family teaching  - Consider wound care consult   Outcome: Progressing     Problem: PAIN - ADULT  Goal: Verbalizes/displays adequate comfort level or baseline comfort level  Description: Interventions:  - Encourage patient to monitor pain and request assistance  - Assess pain using appropriate pain scale  - Administer analgesics based on type and severity of pain and evaluate response  - Implement non-pharmacological measures as appropriate and evaluate response  - Consider cultural and social influences on pain and pain management  - Notify physician/advanced practitioner if interventions unsuccessful or patient reports new pain  Outcome: Progressing     Problem: INFECTION - ADULT  Goal: Absence or prevention of progression during hospitalization  Description: INTERVENTIONS:  - Assess and monitor for signs and symptoms of infection  - Monitor lab/diagnostic results  - Monitor all insertion sites, i.e. indwelling lines, tubes, and drains  - Monitor endotracheal if appropriate and nasal secretions for changes in amount and color  - [place] appropriate cooling/warming therapies per order  - Administer medications as ordered  - Instruct and encourage patient and family to use good hand hygiene technique  - Identify and instruct in appropriate isolation precautions for  identified infection/condition  Outcome: Progressing  Goal: Absence of fever/infection during neutropenic period  Description: INTERVENTIONS:  - Monitor WBC    Outcome: Progressing

## 2024-12-16 NOTE — CASE MANAGEMENT
Case Management Assessment & Discharge Planning Note    Patient name Mathew Rico  Location [facility] 2 /South 2 M* MRN [id number]  : 1950 Date 2024       Current Admission Date: 2024  Current Admission Diagnosis:Stercoral colitis   Patient Active Problem List    Diagnosis Date Noted Date Diagnosed    Bladder wall thickening 2024     Pain of left hip 2024     Left leg pain 2024     Dizziness 2024     Pruritus 2024     Asymptomatic bacteriuria 2024     Blurred vision, bilateral 2024     Symptoms of upper respiratory infection (URI) 06/15/2024     Chest pain 2024     Acute encephalopathy 2024     GERSON on CPAP 2024     Fall 2024     Primary hypertension 2024     Type 2 diabetes mellitus without complication, without long-term current use of insulin ([facility]) 2024     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[facility]) 2024     Multiple sclerosis ([facility]) 2024     Urinary retention 2024     Neck pain 2024     Vitamin B deficiency 2024     Generalized weakness 2024     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     Fall 2023     Weakness 2023     Accidental fall from chair 2023     Constipation 2023     Chronic diastolic congestive heart failure (HCC) 2023     Hyponatremia 2023     Excessive daytime sleepiness 2022     Shortness of breath 2022     Pancreatic mass 2020     Pancreatic lesion 2020     Bladder stones 2019     Bladder neck contracture 2019     History of CVA (cerebrovascular accident) 10/21/2018     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HCC) 2017     Diabetic neuropathy (HCC) 2016     Neurogenic bladder 2016     Thyroid nodule 2015     Cervical spinal stenosis 2014     Generalized anxiety disorder 2014     Obstructive sleep apnea 2013     Benign colon polyp 2012     Esophageal reflux 2012      Fatty liver 06/19/2012     Glaucoma 06/19/2012     Hyperlipidemia 06/19/2012     Multiple sclerosis (HCC) 06/19/2012     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) 06/19/2012     Primary hypertension 06/11/2012       LOS (days): 2  Geometric Mean LOS (GMLOS) (days): 2.8  Days to GMLOS:0.5     OBJECTIVE:    Risk of Unplanned Readmission Score: 56.06         Current admission status: Inpatient       Preferred Pharmacy:   [id number] [place] - [address]  [address] [facility] [address]  Phone: [phone] Fax: [phone]    [place], [place] - [address]  [address] [facility] [address]  Phone: [phone] Fax: [phone]    [facility] [place], [place] - [address]  [address]  [address] [facility] [address]  Phone: [phone] Fax: [phone]     [facility] [place] - [address]  [address] [place] [address]  Phone: [phone] Fax: [phone]    Primary Care Provider: Carolina Kumari MD    Primary Insurance: [facility]  Secondary Insurance:     ASSESSMENT:  Active Health Care Proxies       Jose AGuzman nguyễn [facility] Care Representative -    Primary Phone: [phone] (Home)                 Advance Directives  Does patient have a Health Care POA?: No  Was patient offered paperwork?: No  Does patient currently have a Health Care decision maker?: Yes, please see Health Care Proxy section  Does patient have Advance Directives?: No  Was patient offered paperwork?: No  Primary Contact: Guzman Rico-          Readmission Root Cause  30 Day Readmission: No    Patient Information  Admitted from:: Home  Mental Status: Alert  During Assessment patient was accompanied by: Not accompanied during assessment  Assessment information provided by:: Patient  Primary Caregiver: Family  Caregiver's Name:: Guzman Олег Winston along with  [facility] Staff  Support Systems:  Family members, Home care staff  County of Residence: [place]  What city do you live in?: [place]  Home entry access options. Select all that apply.: Stairs  Number of steps to enter home.: 3  Do the steps have railings?: No  Type of Current Residence: [facility]  Living Arrangements: Lives w/ Family members  Is patient a ?: No    Activities of Daily Living Prior to Admission  Functional Status: Total dependent  Completes ADLs independently?: No  Level of ADL dependence: Total Dependent (does have UE mobility however family/HHA tend to the ADL's)  Ambulates independently?:  (Nonambulatory due to MS)  Does patient use assisted devices?: Yes  Assisted Devices (DME) used: Hospital Bed (Pt states that they keep him in bed for fear of him falling.  Discussed asim lift however he states he does not have one and would love to get OOB into a wheelchair.  CM call to [facility] [employer] nurse inquiring on same)  Does patient currently own DME?: Yes  What DME does the patient currently own?: Bedside Commode, Wheelchair, Shower Chair, Hospital Bed, Walker  Does the patient have a history of Short-Term Rehab?: Yes  Does patient have a history of HHC?: Yes  Does patient currently have HHC?: Yes    Current Home Health Care  Type of Current Home Care Services: Home health aide, Nurse visit (through [facility])    Patient Information Continued  Income Source: SSI/SSD  Does patient have prescription coverage?: Yes ([facility] provides medication)  Does patient have a history of substance abuse?: No  Does patient have a history of Mental Health Diagnosis?: No         Means of Transportation  Means of Transport to Appts:: Other (Comment) (needs stretcher van transport provided by [facility])          DISCHARGE DETAILS:    Discharge planning discussed with:: pt and pt's HÉCTOR Nascimento        CM contacted family/caregiver?: Yes  Were Treatment Team discharge recommendations reviewed with patient/caregiver?: Yes  Did patient/caregiver verbalize  understanding of patient care needs?: Yes       Contacts  Patient Contacts: Guzman Rico  Relationship to Patient:: Family  Contact Method: Phone  Phone Number: [phone]  Reason/Outcome: Emergency Contact    Requested Home Health Care         Is the patient interested in HHC at discharge?: No    DME Referral Provided  Referral made for DME?: No         Would you like to participate in our [facility] service program?  : No - Declined    Treatment Team Recommendation: Home  Discharge Destination Plan:: Home (with continued care by [facility])  Transport at Discharge : Narr8er van                             IMM Given (Date)::  (IMM#1 DONE 12/16/24)  IMM Given to::  (verbal signature provided)

## 2024-12-17 ENCOUNTER — APPOINTMENT (INPATIENT)
Dept: MRI IMAGING | Facility: HOSPITAL | Age: 74
DRG: 392 | End: 2024-12-17
Payer: MEDICARE

## 2024-12-17 ENCOUNTER — TELEPHONE (OUTPATIENT)
Age: 74
End: 2024-12-17

## 2024-12-17 LAB
ANION GAP SERPL CALCULATED.3IONS-SCNC: 8 MMOL/L (ref 4–13)
BACTERIA UR CULT: ABNORMAL
BUN SERPL-MCNC: 7 MG/DL (ref 5–25)
CALCIUM SERPL-MCNC: 8.5 MG/DL (ref 8.4–10.2)
CHLORIDE SERPL-SCNC: 109 MMOL/L (ref 96–108)
CO2 SERPL-SCNC: 21 MMOL/L (ref 21–32)
CREAT SERPL-MCNC: 0.77 MG/DL (ref 0.6–1.3)
GFR SERPL CREATININE-BSD FRML MDRD: 88 ML/MIN/1.73SQ M
GLUCOSE SERPL-MCNC: 141 MG/DL (ref 65–140)
GLUCOSE SERPL-MCNC: 148 MG/DL (ref 65–140)
GLUCOSE SERPL-MCNC: 163 MG/DL (ref 65–140)
GLUCOSE SERPL-MCNC: 263 MG/DL (ref 65–140)
GLUCOSE SERPL-MCNC: 263 MG/DL (ref 65–140)
MAGNESIUM SERPL-MCNC: 2.2 MG/DL (ref 1.9–2.7)
POTASSIUM SERPL-SCNC: 4.1 MMOL/L (ref 3.5–5.3)
SODIUM SERPL-SCNC: 138 MMOL/L (ref 135–147)

## 2024-12-17 PROCEDURE — 70551 MRI BRAIN STEM W/O DYE: CPT

## 2024-12-17 PROCEDURE — 99232 SBSQ HOSP IP/OBS MODERATE 35: CPT | Performed by: STUDENT IN AN ORGANIZED HEALTH CARE EDUCATION/TRAINING PROGRAM

## 2024-12-17 PROCEDURE — 83735 ASSAY OF MAGNESIUM: CPT | Performed by: STUDENT IN AN ORGANIZED HEALTH CARE EDUCATION/TRAINING PROGRAM

## 2024-12-17 PROCEDURE — 82948 REAGENT STRIP/BLOOD GLUCOSE: CPT

## 2024-12-17 PROCEDURE — 99222 1ST HOSP IP/OBS MODERATE 55: CPT | Performed by: PSYCHIATRY & NEUROLOGY

## 2024-12-17 PROCEDURE — 80048 BASIC METABOLIC PNL TOTAL CA: CPT | Performed by: STUDENT IN AN ORGANIZED HEALTH CARE EDUCATION/TRAINING PROGRAM

## 2024-12-17 RX ADMIN — LIDOCAINE 1 PATCH: 50 PATCH TOPICAL at 08:20

## 2024-12-17 RX ADMIN — NYSTATIN: 100000 POWDER TOPICAL at 17:33

## 2024-12-17 RX ADMIN — CLOPIDOGREL BISULFATE 75 MG: 75 TABLET ORAL at 08:19

## 2024-12-17 RX ADMIN — METHENAMINE HIPPURATE 1 G: 1000 TABLET ORAL at 08:20

## 2024-12-17 RX ADMIN — METHENAMINE HIPPURATE 1 G: 1000 TABLET ORAL at 17:32

## 2024-12-17 RX ADMIN — BACLOFEN 5 MG: 10 TABLET ORAL at 08:19

## 2024-12-17 RX ADMIN — NYSTATIN: 100000 POWDER TOPICAL at 08:31

## 2024-12-17 RX ADMIN — LATANOPROST 1 DROP: 50 SOLUTION OPHTHALMIC at 21:11

## 2024-12-17 RX ADMIN — AMLODIPINE BESYLATE 10 MG: 10 TABLET ORAL at 08:19

## 2024-12-17 RX ADMIN — MIRTAZAPINE 7.5 MG: 7.5 TABLET, FILM COATED ORAL at 21:10

## 2024-12-17 RX ADMIN — GABAPENTIN 300 MG: 300 CAPSULE ORAL at 17:32

## 2024-12-17 RX ADMIN — ENOXAPARIN SODIUM 40 MG: 40 INJECTION SUBCUTANEOUS at 08:18

## 2024-12-17 RX ADMIN — BUDESONIDE AND FORMOTEROL FUMARATE DIHYDRATE 2 PUFF: 160; 4.5 AEROSOL RESPIRATORY (INHALATION) at 17:33

## 2024-12-17 RX ADMIN — Medication 500 MCG: at 08:19

## 2024-12-17 RX ADMIN — INSULIN LISPRO 2 UNITS: 100 INJECTION, SOLUTION INTRAVENOUS; SUBCUTANEOUS at 21:10

## 2024-12-17 RX ADMIN — BACLOFEN 5 MG: 10 TABLET ORAL at 17:33

## 2024-12-17 RX ADMIN — GABAPENTIN 300 MG: 300 CAPSULE ORAL at 08:18

## 2024-12-17 RX ADMIN — GABAPENTIN 300 MG: 300 CAPSULE ORAL at 13:13

## 2024-12-17 RX ADMIN — ATORVASTATIN CALCIUM 80 MG: 80 TABLET, FILM COATED ORAL at 08:19

## 2024-12-17 RX ADMIN — PROPRANOLOL HYDROCHLORIDE 60 MG: 60 CAPSULE, EXTENDED RELEASE ORAL at 08:20

## 2024-12-17 RX ADMIN — GABAPENTIN 600 MG: 300 CAPSULE ORAL at 21:51

## 2024-12-17 RX ADMIN — BACLOFEN 5 MG: 10 TABLET ORAL at 21:10

## 2024-12-17 RX ADMIN — INSULIN LISPRO 3 UNITS: 100 INJECTION, SOLUTION INTRAVENOUS; SUBCUTANEOUS at 12:40

## 2024-12-17 RX ADMIN — PANTOPRAZOLE SODIUM 40 MG: 40 TABLET, DELAYED RELEASE ORAL at 05:37

## 2024-12-17 RX ADMIN — BUDESONIDE AND FORMOTEROL FUMARATE DIHYDRATE 2 PUFF: 160; 4.5 AEROSOL RESPIRATORY (INHALATION) at 08:19

## 2024-12-17 NOTE — ASSESSMENT & PLAN NOTE
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

## 2024-12-17 NOTE — ASSESSMENT & PLAN NOTE
Lab Results   Component Value Date    HGBA1C 9.0 (H) 10/06/2024       Recent Labs     12/16/24  1114 12/16/24  1607 12/16/24 2019 12/17/24  0617   POCGLU 159* 152* 246* 148*       Blood Sugar Average: Last 72 hrs:  (P) [id number]

## 2024-12-17 NOTE — ASSESSMENT & PLAN NOTE
Lab Results   Component Value Date    HGBA1C 9.0 (H) 10/06/2024       Recent Labs     12/16/24  1607 12/16/24 2019 12/17/24  0617 12/17/24  1117   POCGLU 152* 246* 148* 263*     Holding his outpatient Jardiance and metformin  His blood sugars are well-controlled with the Humalog insulin sliding scale here in the hospital

## 2024-12-17 NOTE — CONSULTS
Consultation - Neurology   Name: Mathew Rico 74 y.o. 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     Neurology was consulted due to intermittent room spinning dizziness/lightheadedness and feeling like he is going to pass out. Symptoms are intermittent and triggered by movement. No other associated focal neurologic symptoms. Patient has reported this symptoms many times over the last several years and was treated with meclizine and referred to ENT in the past.     Plan:  - Will obtain MRI brain without contrast  - If MRI abnormal, patient will require stroke pathway including CTA head and neck w and wo contrast to assess stability of atherosclerotic disease   - Meclizine PRN  - Recommend IV fluids   - PT/OT  - Continue Plavix and statin for stroke prevention   - Vitamin B12 level pending    If low, recommend high dose B12 injections x 3 days   Will follow result  - Outpatient referral to vestibular therapy, ENT   - Check orthostatics (at least from lying to sitting position) and document if patient is symptomatic   - Medical management and supportive care per primary team   Multiple sclerosis (HCC)  - Follows with [facility]. First diagnosed in 1960s.   - Not on DMTs  - Significant disease burden in upper thoracic cord, most  notably from T3-T5/6  - Baseline deficits include bilateral lower extremity weakness, left sided sensory deficits, and bladder dysfunction.     Neurology follow up recommendations:   Follow up with the MS team in approximately 6 months. Last seen by Karla Macias PA-C. No testing needed prior to appointment. Message sent to schedulers.     History of Present Illness       HPI: Mathew Rico is a 74 y.o. 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  He was found to have acute cystitis and stercoral colitis.  He is being followed by GI and urology.    Neurology was consulted on 12/16 due to dizziness.  Patient reports that he experiences intermittent room spinning when he moves around/moves his head, which causes him to feel lightheaded and like he is going to pass out. He denies any tunnel vision, muffled hearing, diaphoresis, or palpitations at the time. He denies any associated focal neurologic symptoms, other than his baseline. He does have chronic visual issues (blurred vision), chronic b/l lower extremity weakness, and chronic L sided sensory deficits.       Patient has been evaluated in the hospital for dizziness in the past.  He presented to the ED on 7/26/2024 with both room spinning and feeling like he was going to pass out.  He underwent CT/CTA which were unremarkable for acute pathology.  He did have 50 to 69% stenosis of the left ICA.  Etiology was thought to be peripheral vertigo versus secondary to acute cystitis at that time.  He was trialed on meclizine.  He was referred to ENT at discharge.  He reported this again in August and November 2024, as well as multiple times prior to the July admission.     Neurologic history:  Patient follows  with [facility] neurology team for history of multiple sclerosis.  He was diagnosed in the 1960s.  Initial clinical presentation was consistent with bladder/bowel dysfunction and sensory dysfunction of the lower extremity.  He is not ambulatory for many years.  He has a suprapubic catheter.  He has no demyelination in the cervical spine, but longitudinally extensive transverse myelitis T3-T5 with minimal disease in brain, based on imaging from 2014.    He was evaluated for stroke in October 2018 due to right upper extremity weakness.  He was found to have a left hemispheric lacunar infarct.  He is on Plavix and statin for stroke prevention.    Review of Systems   Eyes:  Positive for visual disturbance.   Gastrointestinal:  Positive for abdominal pain (Improved) and nausea.   Musculoskeletal:  Positive for gait problem.   Neurological:  Positive for dizziness, weakness, light-headedness, numbness and headaches. Negative for syncope and facial asymmetry.       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HCC)     Bladder infection     Bronchitis     Constipation     Cough     Diabetes (HCC)     Diabetes mellitus (HCC)     Dizziness     Dysfunction of eustachian tube     Erectile dysfunction of non-organic origin     Fatigue     Glaucoma     Hiatal hernia     Hypertension     Imbalance     Leg muscle spasm     Leukocytosis 04/01/2024    MS (multiple sclerosis) (HCC)     Multiple sclerosis (HCC)     Nephrolithiasis     Neurogenic bladder     No natural teeth     Sinus pain     Spinal stenosis     Strain of thoracic region     Stroke ([facility])     Suprapubic catheter ([facility])      Past Surgical History:   Procedure Laterality Date    APPENDECTOMY      BRAIN SURGERY      Coil placed in aneurysm    CEREBRAL ANEURYSM REPAIR      CYSTOSCOPY      CYSTOSCOPY      CYSTOSCOPY   2018    CYSTOSCOPY  01/15/2021    EYE SURGERY      transscleral cyclophotocoagulation noncontact YAG laser    IR SUPRAPUBIC CATHETER CHECK/CHANGE/REINSERTION/UPSIZE  3/28/2024    MYRINGOTOMY      with ventilation tube insertion    TX LITHOLAPAXY SMPL/SM <2.5 CM N/A 2019    Procedure: CYSTOSCOPY, holmium laser litholapaxy of bladder stones, EXCHANGE OF SP TUBE;  Surgeon: Javy Jeffries MD;  Location: [facility];  Service: Urology    SUPRAPUBIC CATHETER INSERTION       Social History   Social History     Substance and Sexual Activity   Alcohol Use Not Currently     Social History     Substance and Sexual Activity   Drug Use No     E-Cigarette/Vaping    E-Cigarette Use Never User      E-Cigarette/Vaping Substances    Nicotine No     THC No     CBD No     Flavoring No     Other No     Unknown No      Social History     Tobacco Use   Smoking Status Former    Current packs/day: 0.00    Average packs/day: 0.5 packs/day for 31.0 years (15.5 ttl pk-yrs)    Types: Cigarettes    Start date:     Quit date:     Years since quittin.9   Smokeless Tobacco Never     Family History:   Family History   Problem Relation Age of Onset    Heart attack Mother     Stroke Mother     Heart attack Father     Anuerysm Father         In Stomach     Aneurysm Father        Review of previous medical records was completed.     Meds/Allergies   all current active meds have been reviewed, current meds:   Current Facility-Administered Medications:     acetaminophen (TYLENOL) tablet 650 mg, Q6H PRN    albuterol inhalation solution 2.5 mg, Q6H PRN    amLODIPine (NORVASC) tablet 10 mg, Daily **AND** atorvastatin (LIPITOR) tablet 80 mg, Daily    baclofen tablet 5 mg, TID    budesonide-formoterol (SYMBICORT) 160-4.5 mcg/act inhaler 2 puff, BID    clopidogrel (PLAVIX) tablet 75 mg, Daily    cyanocobalamin (VITAMIN B-12) tablet 500 mcg, Daily    enoxaparin (LOVENOX) subcutaneous injection 40 mg, Daily    gabapentin (NEURONTIN) capsule  300 mg, TID    gabapentin (NEURONTIN) capsule 600 mg, HS    insulin lispro (HumALOG/ADMELOG) 100 units/mL subcutaneous injection 1-5 Units, HS    insulin lispro (HumALOG/ADMELOG) 100 units/mL subcutaneous injection 1-6 Units, TID AC **AND** Fingerstick Glucose (POCT), TID AC    latanoprost (XALATAN) 0.005 % ophthalmic solution 1 drop, HS    lidocaine (LIDODERM) 5 % patch 1 patch, Daily    meclizine (ANTIVERT) tablet 12.5 mg, Q8H PRN    melatonin tablet 3 mg, HS PRN    methenamine hippurate (HIPREX) tablet 1 g, BID With Meals    mirtazapine (REMERON) tablet 7.5 mg, HS    morphine injection 1 mg, Q4H PRN    nystatin (MYCOSTATIN) powder, BID    ondansetron (ZOFRAN) injection 4 mg, Q6H PRN    pantoprazole (PROTONIX) EC tablet 40 mg, Daily Before Breakfast    propranolol (INDERAL LA) 24 hr capsule 60 mg, Daily    senna-docusate sodium (SENOKOT S) 8.6-50 mg per tablet 2 tablet, HS    traMADol (ULTRAM) tablet 50 mg, Q6H PRN, and PTA meds:   Prior to Admission Medications   Prescriptions Last Dose Informant Patient Reported? Taking?   Empagliflozin (JARDIANCE) 10 MG TABS tablet  Outside Facility (Specify) Yes No   Sig: Take 10 mg by mouth every morning   Ergocalciferol (VITAMIN D2 PO)  Outside Facility (Specify) Yes No   Sig: Take 50,000 Units by mouth once a week   acetaminophen (TYLENOL) 325 mg tablet  Outside Facility (Specify) No No   Sig: Take 2 tablets (650 mg total) by mouth every 6 (six) hours as needed for mild pain   albuterol (2.5 mg/3 mL) 0.083 % nebulizer solution  Outside Facility (Specify) No No   Sig: Take 3 mL (2.5 mg total) by nebulization every 6 (six) hours as needed for wheezing or shortness of breath   amLODIPine-atorvastatin (CADUET) 10-80 MG per tablet  Outside Facility (Specify) Yes No   Sig: Take 1 tablet by mouth daily   baclofen 10 mg tablet  Outside Facility (Specify) Yes No   Sig: Take 5 mg by mouth 3 (three) times a day   bisacodyl (DULCOLAX) 10 mg suppository  Outside Facility (Specify) No No    Sig: Insert 1 suppository (10 mg total) into the rectum daily as needed for constipation for up to 12 doses   budesonide-formoterol (SYMBICORT) 160-4.5 mcg/act inhaler  Outside Facility (Specify) Yes No   Sig: Inhale 2 puffs 2 (two) times a day Rinse mouth after use.   clopidogrel (PLAVIX) 75 mg tablet  Outside Facility (Specify) No No   Sig: Take 1 tablet (75 mg total) by mouth daily   cromolyn (NASALCHROM) 5.2 MG/ACT nasal spray  Outside Facility (Specify) No No   Si spray into each nostril 3 (three) times a day   cyanocobalamin (VITAMIN B-12) 500 MCG tablet  Outside Facility (Specify) No No   Sig: Take 1 tablet (500 mcg total) by mouth daily   gabapentin (NEURONTIN) 300 mg capsule   No No   Sig: Take 1 capsule (300 mg total) by mouth 3 (three) times a day   gabapentin (NEURONTIN) 300 mg capsule   No No   Sig: Take 2 capsules (600 mg total) by mouth daily at bedtime   latanoprost (XALATAN) 0.005 % ophthalmic solution  Outside Facility (Specify) Yes No   Sig: Administer 1 drop to both eyes daily at bedtime   lidocaine (LIDODERM) 5 %  Outside Facility (Specify) No No   Sig: Apply 1 patch topically over 12 hours daily Remove & Discard patch within 12 hours or as directed by MD   meclizine (ANTIVERT) 12.5 MG tablet  Outside Facility (Specify) No No   Sig: Take 1 tablet (12.5 mg total) by mouth every 8 (eight) hours as needed for dizziness   melatonin 3 mg  Outside Facility (Specify) Yes No   Sig: Take 3 mg by mouth daily at bedtime as needed   metFORMIN (GLUCOPHAGE) 1000 MG tablet  Outside Facility (Specify) Yes No   Sig: Take 1,000 mg by mouth 2 (two) times a day with meals   methenamine hippurate (HIPREX) 1 g tablet  Outside Facility (Specify) No No   Sig: Take 1 tablet (1 g total) by mouth 2 (two) times a day with meals   mirtazapine (REMERON) 7.5 MG tablet  Outside Facility (Specify) Yes No   Sig: Take 7.5 mg by mouth daily at bedtime   pantoprazole (PROTONIX) 40 mg tablet  Outside Facility (Specify) Yes No  "  Sig: Take 40 mg by mouth daily   polyethylene glycol (MIRALAX) 17 g packet  Outside Facility (Specify) No No   Sig: Take 17 g by mouth 2 (two) times a day   propranolol (INDERAL LA) 60 mg 24 hr capsule  Outside Facility (Specify) Yes No   Sig: Take 60 mg by mouth daily   senna-docusate sodium (SENOKOT S) 8.6-50 mg per tablet  Outside Facility (Specify) No No   Sig: Take 2 tablets by mouth daily at bedtime      Facility-Administered Medications: None       Allergies   Allergen Reactions    Cephalexin Rash    Cephalexin Rash       Objective   Vitals:Blood pressure 127/58, pulse 71, temperature 97.7 °F (36.5 °C), resp. rate 17, height 5' 5\" (1.651 m), weight 67.1 kg (147 lb 14.9 oz), SpO2 97%.,Body mass index is 24.62 kg/m².    Intake/Output Summary (Last 24 hours) at 12/17/2024 0936  Last data filed at 12/17/2024 0930  Gross per 24 hour   Intake 2060 ml   Output 2420 ml   Net -360 ml       Invasive Devices:   Invasive Devices       Peripheral Intravenous Line  Duration             Peripheral IV 12/14/24 Right Antecubital 3 days    Peripheral IV 12/15/24 Left;Ventral (anterior) Forearm 1 day              Drain  Duration             Suprapubic Catheter 20 Fr. 6 days                    Physical Exam  Vitals and nursing note reviewed.   Constitutional:       General: He is not in acute distress.     Appearance: He is not toxic-appearing or diaphoretic.   HENT:      Head: Normocephalic and atraumatic.      Nose: Nose normal.      Mouth/Throat:      Mouth: Mucous membranes are dry.   Eyes:      General: No scleral icterus.        Right eye: No discharge.         Left eye: No discharge.      Extraocular Movements: No nystagmus.      Comments: R exotropia  No nystagmus   Cardiovascular:      Rate and Rhythm: Normal rate.   Pulmonary:      Effort: Pulmonary effort is normal. No respiratory distress.      Breath sounds: No stridor.   Musculoskeletal:      Right lower leg: No edema.      Left lower leg: No edema.   Skin:     " General: Skin is warm and dry.   Neurological:      Mental Status: He is oriented to person, place, and time.      Cranial Nerves: Cranial nerve deficit present.      Sensory: Sensory deficit present.      Motor: Weakness present.      Coordination: Coordination normal.   Psychiatric:         Mood and Affect: Mood normal.         Speech: Speech normal.       Neurological Exam  Mental Status  Awake, alert and oriented to person, place and time. Oriented to person, place, and time. Speech is normal. Speech: Hypophonic.    Cranial Nerves  CN II: Vision test: R exotropia  No nystagmus. Visual fields full to confrontation.  CN III, IV, VI: No nystagmus.  CN V:  Left: Diminished sensation of the entire left side of the face.  CN VII: Full and symmetric facial movement.    Motor    UE 5/5 B/L  Limited movement in B/L LE at baseline. .    Sensory  Reduced sensation to light touch in L arm and leg.    Coordination  Right: Finger-to-nose normal. Unable to assess due to weakness.Left: Finger-to-nose normal. Unable to assess due to weakness.    Gait    Deferred due to baseline nonambulatory status .      Lab Results: I have personally reviewed pertinent reports.   Imaging Studies: I have personally reviewed pertinent reports and I have personally reviewed the images. (Prior MRI brain and CTAs)   EKG, Pathology, and Other Studies: I have personally reviewed pertinent reports.  VTE Prophylaxis: VTE covered by:  enoxaparin, Subcutaneous, 40 mg at 12/17/24 0818       Code Status: Level 1 - Full Code

## 2024-12-17 NOTE — UTILIZATION REVIEW
Notification of Unplanned, Urgent, or   Emergency Inpatient Admission   AUTHORIZATION REQUEST   [place] [address]  Tax ID: [id number]  NPI: [id number]  [facility] of Service: [facility]  Admission Level of Care: Inpatient  Place of Service Code: 21     Attending Physician Information  Attending Name and NPI#: Beatrice Wang Md [[id number]]  Phone: [phone]     Admission Information  Inpatient Admission Date/Time: 12/14/24  8:56 AM  Discharge Date/Time: No discharge date for patient encounter.  Admitting Diagnosis Code/Description:  Abdominal pain [R10.9]  Cystitis [N30.90]  Bladder wall thickening [N32.89]  Stercoral colitis [K52.89]     Utilization Review Contact  Marsha Garcia Utilization   Phone: [phone]  Fax: [phone]  Email: [email or web address]  Contact for approvals/pending authorizations, clinical reviews, and discharge.     Physician Advisory Services Contact  Medical Necessity Denial & [facility] Discussion  Phone: [phone]  Fax: [phone]  Email: [email or web address]     DISCHARGE SUPPORT TEAM:  For Patients Discharge Needs & Updates  Phone: [phone] opt. 2 Fax: [phone]  Email: [email or web address]

## 2024-12-17 NOTE — TELEPHONE ENCOUNTER
STILL ADMITTED:12/14/2024 - present (3 days)  Formerly [facility]        ESTABLISHED WITH , LOV 02/27/2023, COREY MCKEON, LOV 08/28/2023    HFU/ SL ALL/ Dizziness      ----- Message from Merly Khalil PA-C sent at 12/17/2024 10:50 AM EST -----  Regarding: HFU  Follow up with the MS team in approximately 6 months. Last seen by Corey Mckeon PA-C

## 2024-12-17 NOTE — ASSESSMENT & PLAN NOTE
Per Urology, general urologic radiographic findings seen in radiation, bladder outlet obstruction, infection and multiple diagnoses.  No evidence of bulky lymphadenopathy or suspicious masses or lesions.  Gold standard cystoscopic evaluation performed by Dr. Javy Jeffries earlier this year, without evidence of masses or lesions.  No further urologic intervention

## 2024-12-17 NOTE — CASE MANAGEMENT
Case Management Discharge Planning Note    Patient name Mathew Rico  Location [facility] M* MRN [id number]  : 1950 Date 2024       Current Admission Date: 2024  Current Admission Diagnosis:Stercoral colitis   Patient Active Problem List    Diagnosis Date Noted Date Diagnosed    Bladder wall thickening 2024     Pain of left hip 2024     Left leg pain 2024     Dizziness 2024     Pruritus 2024     Asymptomatic bacteriuria 2024     Blurred vision, bilateral 2024     Symptoms of upper respiratory infection (URI) 06/15/2024     Chest pain 2024     Acute encephalopathy 2024     GERSON on CPAP 2024     Fall 2024     Primary hypertension 2024     Type 2 diabetes mellitus without complication, without long-term current use of insulin ([facility]) 2024     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HCC) 2024     Multiple sclerosis (HCC) 2024     Urinary retention 2024     Neck pain 2024     Vitamin B deficiency 2024     Generalized weakness 2024     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     Fall 2023     Weakness 2023     Accidental fall from chair 2023     Constipation 2023     Chronic diastolic congestive heart failure (HCC) 2023     Hyponatremia 2023     Excessive daytime sleepiness 2022     Shortness of breath 2022     Pancreatic mass 2020     Pancreatic lesion 2020     Bladder stones 2019     Bladder neck contracture 2019     History of CVA (cerebrovascular accident) 10/21/2018     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HCC) 2017     Diabetic neuropathy (HCC) 2016     Neurogenic bladder 2016     Thyroid nodule 2015     Cervical spinal stenosis 2014     Generalized anxiety disorder 2014     Obstructive sleep apnea 2013     Benign colon polyp 2012     Esophageal reflux 2012     Fatty liver  06/19/2012     Glaucoma 06/19/2012     Hyperlipidemia 06/19/2012     Multiple sclerosis (HCC) 06/19/2012     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) 06/19/2012     Primary hypertension 06/11/2012       LOS (days): 3  Geometric Mean LOS (GMLOS) (days): 2.5  Days to GMLOS:-0.8     OBJECTIVE:  Risk of Unplanned Readmission Score: 55.87         Current admission status: Inpatient   Preferred Pharmacy:   [id number] [place] [facility] [address]  [address] [facility] [address]  Phone: [phone] Fax: [phone]    [place], [place] - [address]  [address] [facility] [address]  Phone: [phone] Fax: [phone]    [facility] [place] - [address],  [address],  First [address] [facility] [address]  Phone: [phone] Fax: [phone]     [facility] [place] - [address]  [address] [facility] [address] [address]  Phone: [phone] Fax: [phone]    Primary Care Provider: Carolina Kumari MD    Primary Insurance: [facility]  Secondary Insurance:     DISCHARGE DETAILS:    Additional Comments: Patient reviewed during care coordination rounds with Dr. Bergman who informed that pt will likely be stable for discharge in 24-48 hours pending Neuro clearance. Plan remains for discharge home with services through [facility] at time of discharge. CM received call back from [facility] informing that a asim lift is not feasible as it would require 2 aides in the home and there is limited space in pt's living area to accommodate a asim lift. CM department to remain available for additional discharge concerns or needs.

## 2024-12-17 NOTE — ASSESSMENT & PLAN NOTE
- Follows with [facility]. First diagnosed in 1960s.   - Not on DMTs  - Significant disease burden in upper thoracic cord, most notably from T3-T5/6  - Baseline deficits include bilateral lower extremity weakness, left sided sensory deficits, and bladder dysfunction.

## 2024-12-17 NOTE — PROGRESS NOTES
Progress Note - Hospitalist   Name: Mathew Rico 74 y.o. male I MRN: [id number]  Unit/Bed#: [place] [address] -02 I Date of Admission: 12/14/2024   Date of Service: 12/17/2024 I Hospital Day: 3    Assessment & Plan  Stercoral colitis  Patient has issues with chronic constipation presents with severe abdominal pain thought to be due to stercoral colitis and constipation  Bowel regimen increased, currently having good bowel movements with improvement in abdominal pain  Continue miralax twice daily then titrate accordingly  Asymptomatic bacteriuria  Patient asymptomatic, urine cultures growing 80,000-89,000 CFU MDR Enterobacter cloacae  Given this was drawn from chronic suprapubic cath and patient with no other clinical findings of infection, will discontinue antbiotics and monitor off antibiotics for now  Multiple sclerosis (HCC)  Has chronic urinary retention requiring a suprapubic urine catheter  Type 2 diabetes mellitus without complication, without long-term current use of insulin ([facility])  Lab Results   Component Value Date    HGBA1C 9.0 (H) 10/06/2024       Recent Labs     12/16/24  1607 12/16/24  2019 12/17/24  0617 12/17/24  1117   POCGLU 152* 246* 148* 263*     Holding his outpatient Jardiance and metformin  His blood sugars are well-controlled with the Humalog insulin sliding scale here in the hospital  Neurogenic bladder  Has a chronic suprapubic catheter.  It was not draining much when he came in.  This may have also contributed to his abdominal pain.  Urology is working on it and have been flushing the catheter and it is now draining  Bladder wall thickening  Per Urology, general urologic radiographic findings seen in radiation, bladder outlet obstruction, infection and multiple diagnoses.  No evidence of bulky lymphadenopathy or suspicious masses or lesions.  Gold standard cystoscopic evaluation performed by Dr. Javy Jeffries earlier this year, without evidence of masses or lesions.  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 Sequential Compression Devices Ordered.    Mobility:   Basic Mobility Inpatient Raw Score: 7  JH-HLM Goal: 2: Bed activities/Dependent transfer  JH-HLM Achieved: 2: Bed activities/Dependent transfer  JH-HLM Goal achieved. Continue to encourage appropriate mobility.    Patient Centered Rounds: I performed bedside rounds with nursing staff today.   Discussions with Specialists or Other Care Team Provider: Neurology    Education and Discussions with Family / Patient: Updated  (brother) via phone.    Current Length of Stay: 3 day(s)  Current Patient Status: Inpatient   Certification Statement: The patient will continue to require additional inpatient hospital stay due to work-up for dizziness  Discharge Plan: Anticipate discharge in 24-48 hrs to home.    Code Status: Level 1 - Full Code    Subjective   Patient seen and examined at bedside. Feels well, still with dizziness.     Objective :  Temp:  [97.7 °F (36.5 °C)-97.9 °F (36.6 °C)] 97.7 °F (36.5 °C)  HR:  [70-72] 72  BP: (110-130)/(49-59) 130/59  Resp:  [16-18] 17  SpO2:  [96 %-100 %] 97 %  O2 Device: None (Room air)    Body mass index is 24.62 kg/m².     Input and Output Summary (last 24 hours):     Intake/Output Summary (Last 24 hours) at 12/17/2024 1431  Last data filed at 12/17/2024 1414  Gross per 24 hour   Intake 2160 ml   Output 650 ml   Net 1510 ml       Physical Exam  Vitals and nursing note reviewed.    Constitutional:       General: He is not in acute distress.     Appearance: He is well-developed.   HENT:      Head: Normocephalic and atraumatic.   Eyes:      Conjunctiva/sclera: Conjunctivae normal.   Cardiovascular:      Rate and Rhythm: Normal rate and regular rhythm.      Heart sounds: No murmur heard.  Pulmonary:      Effort: Pulmonary effort is normal. No respiratory distress.      Breath sounds: Normal breath sounds.   Abdominal:      Palpations: Abdomen is soft.      Tenderness: There is no abdominal tenderness.   Musculoskeletal:         General: No swelling.      Cervical back: Neck supple.   Skin:     General: Skin is warm and dry.      Capillary Refill: Capillary refill takes less than 2 seconds.   Neurological:      Mental Status: He is alert and oriented to person, place, and time. Mental status is at baseline.   Psychiatric:         Mood and Affect: Mood normal.       Lines/Drains:  Lines/Drains/Airways       Active Status       Name Placement date Placement time Site Days    Suprapubic Catheter 20 Fr. 12/10/24  1030  --  7                            Lab Results: I have reviewed the following results:   Results from last 7 days   Lab Units 12/16/24  0448   WBC Thousand/uL 9.65   HEMOGLOBIN g/dL 11.8*   HEMATOCRIT % 36.8   PLATELETS Thousands/uL 302   SEGS PCT % 47   LYMPHO PCT % 35   MONO PCT % 9   EOS PCT % 8*     Results from last 7 days   Lab Units 12/17/24  0355 12/15/24  0502 12/14/24  0444   SODIUM mmol/L 138   < > 135   POTASSIUM mmol/L 4.1   < > 4.0   CHLORIDE mmol/L 109*   < > 101   CO2 mmol/L 21   < > 23   BUN mg/dL 7   < > 17   CREATININE mg/dL 0.77   < > 0.98   ANION GAP mmol/L 8   < > 11   CALCIUM mg/dL 8.5   < > 10.0   ALBUMIN g/dL  --   --  4.2   TOTAL BILIRUBIN mg/dL  --   --  0.59   ALK PHOS U/L  --   --  90   ALT U/L  --   --  8   AST U/L  --   --  10*   GLUCOSE RANDOM mg/dL 163*   < > 126    < > = values in this interval not displayed.     Results from last 7 days   Lab Units  12/14/24  0919   INR  0.95     Results from last 7 days   Lab Units 12/17/24  1117 12/17/24  0617 12/16/24  2019 12/16/24  1607 12/16/24  1114 12/16/24  0621 12/15/24  2037 12/15/24  1628 12/15/24  1145 12/15/24  0747 12/14/24  2116 12/14/24  1608   POC GLUCOSE mg/dl 263* 148* 246* 152* 159* 116 242* 133 156* 101 127 156*         Results from last 7 days   Lab Units 12/14/24  0919   LACTIC ACID mmol/L 1.2       Recent Cultures (last 7 days):   Results from last 7 days   Lab Units 12/14/24  1211 12/14/24  0919   BLOOD CULTURE   --  No Growth at 72 hrs.  No Growth at 72 hrs.   URINE CULTURE  >100,000 cfu/ml Enterobacter cloacae*  >100,000 cfu/ml Candida glabrata*  >100,000 cfu/ml  --        Imaging Results Review: No pertinent imaging studies reviewed.  Other Study Results Review: No additional pertinent studies reviewed.    Last 24 Hours Medication List:     Current Facility-Administered Medications:     acetaminophen (TYLENOL) tablet 650 mg, Q6H PRN    albuterol inhalation solution 2.5 mg, Q6H PRN    amLODIPine (NORVASC) tablet 10 mg, Daily **AND** atorvastatin (LIPITOR) tablet 80 mg, Daily    baclofen tablet 5 mg, TID    budesonide-formoterol (SYMBICORT) 160-4.5 mcg/act inhaler 2 puff, BID    clopidogrel (PLAVIX) tablet 75 mg, Daily    cyanocobalamin (VITAMIN B-12) tablet 500 mcg, Daily    enoxaparin (LOVENOX) subcutaneous injection 40 mg, Daily    gabapentin (NEURONTIN) capsule 300 mg, TID    gabapentin (NEURONTIN) capsule 600 mg, HS    insulin lispro (HumALOG/ADMELOG) 100 units/mL subcutaneous injection 1-5 Units, HS    insulin lispro (HumALOG/ADMELOG) 100 units/mL subcutaneous injection 1-6 Units, TID AC **AND** Fingerstick Glucose (POCT), TID AC    latanoprost (XALATAN) 0.005 % ophthalmic solution 1 drop, HS    lidocaine (LIDODERM) 5 % patch 1 patch, Daily    meclizine (ANTIVERT) tablet 12.5 mg, Q8H PRN    melatonin tablet 3 mg, HS PRN    methenamine hippurate (HIPREX) tablet 1 g, BID With Meals     mirtazapine (REMERON) tablet 7.5 mg, HS    morphine injection 1 mg, Q4H PRN    nystatin (MYCOSTATIN) powder, BID    ondansetron (ZOFRAN) injection 4 mg, Q6H PRN    pantoprazole (PROTONIX) EC tablet 40 mg, Daily Before Breakfast    propranolol (INDERAL LA) 24 hr capsule 60 mg, Daily    senna-docusate sodium (SENOKOT S) 8.6-50 mg per tablet 2 tablet, HS    traMADol (ULTRAM) tablet 50 mg, Q6H PRN    Administrative Statements   Today, Patient Was Seen By: Beatrice Plunkett MD      **Please Note: This note may have been constructed using a voice recognition system.**

## 2024-12-17 NOTE — PLAN OF CARE
Problem: Prexisting or High Potential for Compromised Skin Integrity  Goal: Skin integrity is maintained or improved  Description: INTERVENTIONS:  - Identify patients at risk for skin breakdown  - Assess and monitor skin integrity  - Assess and monitor nutrition and hydration status  - Monitor labs   - Assess for incontinence   - Turn and reposition patient  - Assist with mobility/ambulation  - Relieve pressure over bony prominences  - Avoid friction and shearing  - Provide appropriate hygiene as needed including keeping skin clean and dry  - Evaluate need for skin moisturizer/barrier cream  - Collaborate with interdisciplinary team   - Patient/family teaching  - Consider wound care consult   Outcome: Progressing     Problem: PAIN - ADULT  Goal: Verbalizes/displays adequate comfort level or baseline comfort level  Description: Interventions:  - Encourage patient to monitor pain and request assistance  - Assess pain using appropriate pain scale  - Administer analgesics based on type and severity of pain and evaluate response  - Implement non-pharmacological measures as appropriate and evaluate response  - Consider cultural and social influences on pain and pain management  - Notify physician/advanced practitioner if interventions unsuccessful or patient reports new pain  Outcome: Progressing     Problem: INFECTION - ADULT  Goal: Absence or prevention of progression during hospitalization  Description: INTERVENTIONS:  - Assess and monitor for signs and symptoms of infection  - Monitor lab/diagnostic results  - Monitor all insertion sites, i.e. indwelling lines, tubes, and drains  - Monitor endotracheal if appropriate and nasal secretions for changes in amount and color  - [place] appropriate cooling/warming therapies per order  - Administer medications as ordered  - Instruct and encourage patient and family to use good hand hygiene technique  - Identify and instruct in appropriate isolation precautions for  identified infection/condition  Outcome: Progressing  Goal: Absence of fever/infection during neutropenic period  Description: INTERVENTIONS:  - Monitor WBC    Outcome: Progressing     Problem: SAFETY ADULT  Goal: Patient will remain free of falls  Description: INTERVENTIONS:  - Educate patient/family on patient safety including physical limitations  - Instruct patient to call for assistance with activity   - Consult OT/PT to assist with strengthening/mobility   - Keep Call bell within reach  - Keep bed low and locked with side rails adjusted as appropriate  - Keep care items and personal belongings within reach  - Initiate and maintain comfort rounds  - Make Fall Risk Sign visible to staff  - Offer Toileting every 2 Hours, in advance of need  - Initiate/Maintain bed alarm  - Apply yellow socks and bracelet for high fall risk patients  - Consider moving patient to room near nurses station  Outcome: Progressing  Goal: Maintain or return to baseline ADL function  Description: INTERVENTIONS:  -  Assess patient's ability to carry out ADLs; assess patient's baseline for ADL function and identify physical deficits which impact ability to perform ADLs (bathing, care of mouth/teeth, toileting, grooming, dressing, etc.)  - Assess/evaluate cause of self-care deficits   - Assess range of motion  - Assess patient's mobility; develop plan if impaired  - Assess patient's need for assistive devices and provide as appropriate  - Encourage maximum independence but intervene and supervise when necessary  - Involve family in performance of ADLs  - Assess for home care needs following discharge   - Consider OT consult to assist with ADL evaluation and planning for discharge  - Provide patient education as appropriate  Outcome: Progressing  Goal: Maintains/Returns to pre admission functional level  Description: INTERVENTIONS:  - Perform AM-PAC 6 Click Basic Mobility/ Daily Activity assessment daily.  - Set and communicate daily mobility  goal to care team and patient/family/caregiver.   - Collaborate with rehabilitation services on mobility goals if consulted  - Perform Range of Motion 2 times a day.  - Reposition patient every 2 hours  - Out of bed to chair 3 times a day   - Out of bed for meals 3 times a day  - Out of bed for toileting  - Record patient progress and toleration of activity level   Outcome: Progressing     Problem: DISCHARGE PLANNING  Goal: Discharge to home or other facility with appropriate resources  Description: INTERVENTIONS:  - Identify barriers to discharge w/patient and caregiver  - Arrange for needed discharge resources and transportation as appropriate  - Identify discharge learning needs (meds, wound care, etc.)  - Arrange for interpretive services to assist at discharge as needed  - Refer to Case Management Department for coordinating discharge planning if the patient needs post-hospital services based on physician/advanced practitioner order or complex needs related to functional status, cognitive ability, or social support system  Outcome: Progressing     Problem: Knowledge Deficit  Goal: Patient/family/caregiver demonstrates understanding of disease process, treatment plan, medications, and discharge instructions  Description: Complete learning assessment and assess knowledge base.  Interventions:  - Provide teaching at level of understanding  - Provide teaching via preferred learning methods  Outcome: Progressing     Problem: Nutrition/Hydration-ADULT  Goal: Nutrient/Hydration intake appropriate for improving, restoring or maintaining nutritional needs  Description: Monitor and assess patient's nutrition/hydration status for malnutrition. Collaborate with interdisciplinary team and initiate plan and interventions as ordered.  Monitor patient's weight and dietary intake as ordered or per policy. Utilize nutrition screening tool and intervene as necessary. Determine patient's food preferences and provide high-protein,  high-caloric foods as appropriate.     INTERVENTIONS:  - Monitor oral intake, urinary output, labs, and treatment plans  - Assess nutrition and hydration status and recommend course of action  - Evaluate amount of meals eaten  - Assist patient with eating if necessary   - Allow adequate time for meals  - Recommend/ encourage appropriate diets, oral nutritional supplements, and vitamin/mineral supplements  - Order, calculate, and assess calorie counts as needed  - Recommend, monitor, and adjust tube feedings and TPN/PPN based on assessed needs  - Assess need for intravenous fluids  - Provide specific nutrition/hydration education as appropriate  - Include patient/family/caregiver in decisions related to nutrition  Outcome: Progressing     Problem: NEUROSENSORY - ADULT  Goal: Achieves maximal functionality and self care  Description: INTERVENTIONS  - Monitor swallowing and airway patency with patient fatigue and changes in neurological status  - Encourage and assist patient to increase activity and self care.   - Encourage visually impaired, hearing impaired and aphasic patients to use assistive/communication devices  Outcome: Progressing     Problem: GASTROINTESTINAL - ADULT  Goal: Minimal or absence of nausea and/or vomiting  Description: INTERVENTIONS:  - Administer IV fluids if ordered to ensure adequate hydration  - Maintain NPO status until nausea and vomiting are resolved  - Nasogastric tube if ordered  - Administer ordered antiemetic medications as needed  - Provide nonpharmacologic comfort measures as appropriate  - Advance diet as tolerated, if ordered  - Consider nutrition services referral to assist patient with adequate nutrition and appropriate food choices  Outcome: Progressing  Goal: Maintains or returns to baseline bowel function  Description: INTERVENTIONS:  - Assess bowel function  - Encourage oral fluids to ensure adequate hydration  - Administer IV fluids if ordered to ensure adequate hydration  -  Administer ordered medications as needed  - Encourage mobilization and activity  - Consider nutritional services referral to assist patient with adequate nutrition and appropriate food choices  Outcome: Progressing  Goal: Maintains adequate nutritional intake  Description: INTERVENTIONS:  - Monitor percentage of each meal consumed  - Identify factors contributing to decreased intake, treat as appropriate  - Assist with meals as needed  - Monitor I&O, weight, and lab values if indicated  - Obtain nutrition services referral as needed  Outcome: Progressing     Problem: GENITOURINARY - ADULT  Goal: Maintains or returns to baseline urinary function  Description: INTERVENTIONS:  - Assess urinary function  - Encourage oral fluids to ensure adequate hydration if ordered  - Administer IV fluids as ordered to ensure adequate hydration  - Administer ordered medications as needed  - Offer frequent toileting  - Follow urinary retention protocol if ordered  Outcome: Progressing  Goal: Absence of urinary retention  Description: INTERVENTIONS:  - Assess patient’s ability to void and empty bladder  - Monitor I/O  - Bladder scan as needed  - Discuss with physician/AP medications to alleviate retention as needed  - Discuss catheterization for long term situations as appropriate  Outcome: Progressing  Goal: Urinary catheter remains patent  Description: INTERVENTIONS:  - Assess patency of urinary catheter  - If patient has a chronic dela cruz, consider changing catheter if non-functioning  - Follow guidelines for intermittent irrigation of non-functioning urinary catheter  Outcome: Progressing     Problem: METABOLIC, FLUID AND ELECTROLYTES - ADULT  Goal: Electrolytes maintained within normal limits  Description: INTERVENTIONS:  - Monitor labs and assess patient for signs and symptoms of electrolyte imbalances  - Administer electrolyte replacement as ordered  - Monitor response to electrolyte replacements, including repeat lab results as  appropriate  - Instruct patient on fluid and nutrition as appropriate  Outcome: Progressing  Goal: Fluid balance maintained  Description: INTERVENTIONS:  - Monitor labs   - Monitor I/O and WT  - Instruct patient on fluid and nutrition as appropriate  - Assess for signs & symptoms of volume excess or deficit  Outcome: Progressing  Goal: Glucose maintained within target range  Description: INTERVENTIONS:  - Monitor Blood Glucose as ordered  - Assess for signs and symptoms of hyperglycemia and hypoglycemia  - Administer ordered medications to maintain glucose within target range  - Assess nutritional intake and initiate nutrition service referral as needed  Outcome: Progressing     Problem: SKIN/TISSUE INTEGRITY - ADULT  Goal: Skin Integrity remains intact(Skin Breakdown Prevention)  Description: Assess:  -Perform Guille assessment ever  -Clean and moisturize skin every   -Inspect skin when repositioning, toileting, and assisting with ADLS  -Assess under medical devices such as  every   -Assess extremities for adequate circulation and sensation     Bed Management:  -Have minimal linens on bed & keep smooth, unwrinkled  -Change linens as needed when moist or perspiring  -Avoid sitting or lying in one position for more than  hours while in bed  -Keep HOB at degrees     Toileting:  -Offer bedside commode  -Assess for incontinence every   -Use incontinent care products after each incontinent episode such as     Activity:  -Mobilize patient  times a day  -Encourage activity and walks on unit  -Encourage or provide ROM exercises   -Turn and reposition patient every  Hours  -Use appropriate equipment to lift or move patient in bed  -Instruct/ Assist with weight shifting every  when out of bed in chair  -Consider limitation of chair time  hour intervals    Skin Care:  -Avoid use of baby powder, tape, friction and shearing, hot water or constrictive clothing  -Relieve pressure over bony prominences using   -Do not massage red  bony areas    Next Steps:  -Teach patient strategies to minimize risks such as    -Consider consults to  interdisciplinary teams such as  Outcome: Progressing     Problem: MUSCULOSKELETAL - ADULT  Goal: Maintain or return mobility to safest level of function  Description: INTERVENTIONS:  - Assess patient's ability to carry out ADLs; assess patient's baseline for ADL function and identify physical deficits which impact ability to perform ADLs (bathing, care of mouth/teeth, toileting, grooming, dressing, etc.)  - Assess/evaluate cause of self-care deficits   - Assess range of motion  - Assess patient's mobility  - Assess patient's need for assistive devices and provide as appropriate  - Encourage maximum independence but intervene and supervise when necessary  - Involve family in performance of ADLs  - Assess for home care needs following discharge   - Consider OT consult to assist with ADL evaluation and planning for discharge  - Provide patient education as appropriate  Outcome: Progressing

## 2024-12-17 NOTE — ASSESSMENT & PLAN NOTE
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     Neurology was consulted due to intermittent room spinning dizziness/lightheadedness and feeling like he is going to pass out. Symptoms are intermittent and triggered by movement. No other associated focal neurologic symptoms. Patient has reported this symptoms many times over the last several years and was treated with meclizine and referred to ENT in the past.     Plan:  - Will obtain MRI brain without contrast  - If MRI abnormal, patient will require stroke pathway including CTA head and neck w and wo contrast to assess stability of atherosclerotic disease   - Meclizine PRN  - Recommend IV fluids   - PT/OT  - Continue Plavix and statin for stroke prevention   - Vitamin B12 level pending    If low, recommend high dose B12 injections x 3 days   Will follow result  - Outpatient referral to vestibular therapy, ENT   - Check orthostatics (at least from lying to sitting position) and document if patient is symptomatic   - Medical management and supportive care per primary team

## 2024-12-18 ENCOUNTER — APPOINTMENT (INPATIENT)
Dept: NON INVASIVE DIAGNOSTICS | Facility: HOSPITAL | Age: 74
DRG: 392 | End: 2024-12-18
Payer: MEDICARE

## 2024-12-18 VITALS
OXYGEN SATURATION: 96 % | RESPIRATION RATE: 18 BRPM | BODY MASS INDEX: 24.49 KG/M2 | SYSTOLIC BLOOD PRESSURE: 127 MMHG | WEIGHT: 147 LBS | HEART RATE: 73 BPM | TEMPERATURE: 98.3 F | DIASTOLIC BLOOD PRESSURE: 40 MMHG | HEIGHT: 65 IN

## 2024-12-18 PROBLEM — Z86.73 HISTORY OF STROKE: Status: ACTIVE | Noted: 2024-12-18

## 2024-12-18 LAB
APICAL FOUR CHAMBER EJECTION FRACTION: 70 %
ATRIAL RATE: 77 BPM
BSA FOR ECHO PROCEDURE: 1.74 M2
CHOLEST SERPL-MCNC: 119 MG/DL (ref ?–200)
E WAVE DECELERATION TIME: 402 MS
E/A RATIO: 0.58
GLUCOSE SERPL-MCNC: 161 MG/DL (ref 65–140)
GLUCOSE SERPL-MCNC: 240 MG/DL (ref 65–140)
GLUCOSE SERPL-MCNC: 271 MG/DL (ref 65–140)
HDLC SERPL-MCNC: 36 MG/DL
LDLC SERPL CALC-MCNC: 52 MG/DL (ref 0–100)
LEFT ATRIUM AREA SYSTOLE SINGLE PLANE A4C: 17.1 CM2
LEFT ATRIUM VOLUME INDEX (MOD BIPLANE): 41.5 ML/M2
MV E'TISSUE VEL-LAT: 7 CM/S
MV E'TISSUE VEL-SEP: 8 CM/S
MV PEAK A VEL: 1.32 M/S
MV PEAK E VEL: 76 CM/S
MV STENOSIS PRESSURE HALF TIME: 117 MS
MV VALVE AREA P 1/2 METHOD: 1.88
P AXIS: 58 DEGREES
PR INTERVAL: 180 MS
QRS AXIS: 67 DEGREES
QRSD INTERVAL: 94 MS
QT INTERVAL: 402 MS
QTC INTERVAL: 455 MS
RIGHT ATRIUM AREA SYSTOLE A4C: 12.1 CM2
RIGHT VENTRICLE ID DIMENSION: 3.5 CM
SL CV LV EF: 65
T WAVE AXIS: 43 DEGREES
TRICUSPID ANNULAR PLANE SYSTOLIC EXCURSION: 2 CM
TRIGL SERPL-MCNC: 154 MG/DL (ref ?–150)
VENTRICULAR RATE: 77 BPM
VIT B12 SERPL-MCNC: 308 PG/ML (ref 180–914)

## 2024-12-18 PROCEDURE — 82948 REAGENT STRIP/BLOOD GLUCOSE: CPT

## 2024-12-18 PROCEDURE — 82607 VITAMIN B-12: CPT | Performed by: PHYSICIAN ASSISTANT

## 2024-12-18 PROCEDURE — 99239 HOSP IP/OBS DSCHRG MGMT >30: CPT | Performed by: STUDENT IN AN ORGANIZED HEALTH CARE EDUCATION/TRAINING PROGRAM

## 2024-12-18 PROCEDURE — 80061 LIPID PANEL: CPT | Performed by: PHYSICIAN ASSISTANT

## 2024-12-18 PROCEDURE — 93306 TTE W/DOPPLER COMPLETE: CPT | Performed by: INTERNAL MEDICINE

## 2024-12-18 PROCEDURE — 99232 SBSQ HOSP IP/OBS MODERATE 35: CPT | Performed by: PSYCHIATRY & NEUROLOGY

## 2024-12-18 PROCEDURE — 93306 TTE W/DOPPLER COMPLETE: CPT

## 2024-12-18 PROCEDURE — 93010 ELECTROCARDIOGRAM REPORT: CPT | Performed by: INTERNAL MEDICINE

## 2024-12-18 RX ADMIN — AMLODIPINE BESYLATE 10 MG: 10 TABLET ORAL at 09:31

## 2024-12-18 RX ADMIN — INSULIN LISPRO 3 UNITS: 100 INJECTION, SOLUTION INTRAVENOUS; SUBCUTANEOUS at 12:22

## 2024-12-18 RX ADMIN — GABAPENTIN 300 MG: 300 CAPSULE ORAL at 13:22

## 2024-12-18 RX ADMIN — BACLOFEN 5 MG: 10 TABLET ORAL at 09:31

## 2024-12-18 RX ADMIN — Medication 500 MCG: at 09:31

## 2024-12-18 RX ADMIN — PROPRANOLOL HYDROCHLORIDE 60 MG: 60 CAPSULE, EXTENDED RELEASE ORAL at 09:33

## 2024-12-18 RX ADMIN — INSULIN LISPRO 1 UNITS: 100 INJECTION, SOLUTION INTRAVENOUS; SUBCUTANEOUS at 09:28

## 2024-12-18 RX ADMIN — METHENAMINE HIPPURATE 1 G: 1000 TABLET ORAL at 15:46

## 2024-12-18 RX ADMIN — CLOPIDOGREL BISULFATE 75 MG: 75 TABLET ORAL at 09:31

## 2024-12-18 RX ADMIN — METHENAMINE HIPPURATE 1 G: 1000 TABLET ORAL at 09:32

## 2024-12-18 RX ADMIN — BACLOFEN 5 MG: 10 TABLET ORAL at 15:45

## 2024-12-18 RX ADMIN — GABAPENTIN 300 MG: 300 CAPSULE ORAL at 09:31

## 2024-12-18 RX ADMIN — PANTOPRAZOLE SODIUM 40 MG: 40 TABLET, DELAYED RELEASE ORAL at 05:15

## 2024-12-18 RX ADMIN — INSULIN LISPRO 4 UNITS: 100 INJECTION, SOLUTION INTRAVENOUS; SUBCUTANEOUS at 15:47

## 2024-12-18 RX ADMIN — ENOXAPARIN SODIUM 40 MG: 40 INJECTION SUBCUTANEOUS at 09:29

## 2024-12-18 RX ADMIN — ATORVASTATIN CALCIUM 80 MG: 80 TABLET, FILM COATED ORAL at 09:31

## 2024-12-18 RX ADMIN — NYSTATIN: 100000 POWDER TOPICAL at 09:34

## 2024-12-18 NOTE — NURSING NOTE
Patient discharged to home.transported by [facility].  Pt left with all belongings. AVS sent with patient. Family (brother Guzman) called and message left if there are any questions to call facility with phone number provided.

## 2024-12-18 NOTE — PLAN OF CARE
Problem: Prexisting or High Potential for Compromised Skin Integrity  Goal: Skin integrity is maintained or improved  Description: INTERVENTIONS:  - Identify patients at risk for skin breakdown  - Assess and monitor skin integrity  - Assess and monitor nutrition and hydration status  - Monitor labs   - Assess for incontinence   - Turn and reposition patient  - Assist with mobility/ambulation  - Relieve pressure over bony prominences  - Avoid friction and shearing  - Provide appropriate hygiene as needed including keeping skin clean and dry  - Evaluate need for skin moisturizer/barrier cream  - Collaborate with interdisciplinary team   - Patient/family teaching  - Consider wound care consult   Outcome: Progressing     Problem: PAIN - ADULT  Goal: Verbalizes/displays adequate comfort level or baseline comfort level  Description: Interventions:  - Encourage patient to monitor pain and request assistance  - Assess pain using appropriate pain scale  - Administer analgesics based on type and severity of pain and evaluate response  - Implement non-pharmacological measures as appropriate and evaluate response  - Consider cultural and social influences on pain and pain management  - Notify physician/advanced practitioner if interventions unsuccessful or patient reports new pain  Outcome: Progressing     Problem: INFECTION - ADULT  Goal: Absence or prevention of progression during hospitalization  Description: INTERVENTIONS:  - Assess and monitor for signs and symptoms of infection  - Monitor lab/diagnostic results  - Monitor all insertion sites, i.e. indwelling lines, tubes, and drains  - Monitor endotracheal if appropriate and nasal secretions for changes in amount and color  - [place] appropriate cooling/warming therapies per order  - Administer medications as ordered  - Instruct and encourage patient and family to use good hand hygiene technique  - Identify and instruct in appropriate isolation precautions for  identified infection/condition  Outcome: Progressing  Goal: Absence of fever/infection during neutropenic period  Description: INTERVENTIONS:  - Monitor WBC    Outcome: Progressing     Problem: SAFETY ADULT  Goal: Patient will remain free of falls  Description: INTERVENTIONS:  - Educate patient/family on patient safety including physical limitations  - Instruct patient to call for assistance with activity   - Consult OT/PT to assist with strengthening/mobility   - Keep Call bell within reach  - Keep bed low and locked with side rails adjusted as appropriate  - Keep care items and personal belongings within reach  - Initiate and maintain comfort rounds  - Make Fall Risk Sign visible to staff  - Offer Toileting every 2 Hours, in advance of need  - Initiate/Maintain bed alarm  - Apply yellow socks and bracelet for high fall risk patients  - Consider moving patient to room near nurses station  Outcome: Progressing  Goal: Maintain or return to baseline ADL function  Description: INTERVENTIONS:  -  Assess patient's ability to carry out ADLs; assess patient's baseline for ADL function and identify physical deficits which impact ability to perform ADLs (bathing, care of mouth/teeth, toileting, grooming, dressing, etc.)  - Assess/evaluate cause of self-care deficits   - Assess range of motion  - Assess patient's mobility; develop plan if impaired  - Assess patient's need for assistive devices and provide as appropriate  - Encourage maximum independence but intervene and supervise when necessary  - Involve family in performance of ADLs  - Assess for home care needs following discharge   - Consider OT consult to assist with ADL evaluation and planning for discharge  - Provide patient education as appropriate  Outcome: Progressing  Goal: Maintains/Returns to pre admission functional level  Description: INTERVENTIONS:  - Perform AM-PAC 6 Click Basic Mobility/ Daily Activity assessment daily.  - Set and communicate daily mobility  goal to care team and patient/family/caregiver.   - Collaborate with rehabilitation services on mobility goals if consulted  - Perform Range of Motion 2 times a day.  - Reposition patient every 2 hours  - Out of bed to chair 3 times a day   - Out of bed for meals 3 times a day  - Out of bed for toileting  - Record patient progress and toleration of activity level   Outcome: Progressing     Problem: DISCHARGE PLANNING  Goal: Discharge to home or other facility with appropriate resources  Description: INTERVENTIONS:  - Identify barriers to discharge w/patient and caregiver  - Arrange for needed discharge resources and transportation as appropriate  - Identify discharge learning needs (meds, wound care, etc.)  - Arrange for interpretive services to assist at discharge as needed  - Refer to Case Management Department for coordinating discharge planning if the patient needs post-hospital services based on physician/advanced practitioner order or complex needs related to functional status, cognitive ability, or social support system  Outcome: Progressing     Problem: Knowledge Deficit  Goal: Patient/family/caregiver demonstrates understanding of disease process, treatment plan, medications, and discharge instructions  Description: Complete learning assessment and assess knowledge base.  Interventions:  - Provide teaching at level of understanding  - Provide teaching via preferred learning methods  Outcome: Progressing     Problem: Nutrition/Hydration-ADULT  Goal: Nutrient/Hydration intake appropriate for improving, restoring or maintaining nutritional needs  Description: Monitor and assess patient's nutrition/hydration status for malnutrition. Collaborate with interdisciplinary team and initiate plan and interventions as ordered.  Monitor patient's weight and dietary intake as ordered or per policy. Utilize nutrition screening tool and intervene as necessary. Determine patient's food preferences and provide high-protein,  high-caloric foods as appropriate.     INTERVENTIONS:  - Monitor oral intake, urinary output, labs, and treatment plans  - Assess nutrition and hydration status and recommend course of action  - Evaluate amount of meals eaten  - Assist patient with eating if necessary   - Allow adequate time for meals  - Recommend/ encourage appropriate diets, oral nutritional supplements, and vitamin/mineral supplements  - Order, calculate, and assess calorie counts as needed  - Recommend, monitor, and adjust tube feedings and TPN/PPN based on assessed needs  - Assess need for intravenous fluids  - Provide specific nutrition/hydration education as appropriate  - Include patient/family/caregiver in decisions related to nutrition  Outcome: Progressing     Problem: NEUROSENSORY - ADULT  Goal: Achieves maximal functionality and self care  Description: INTERVENTIONS  - Monitor swallowing and airway patency with patient fatigue and changes in neurological status  - Encourage and assist patient to increase activity and self care.   - Encourage visually impaired, hearing impaired and aphasic patients to use assistive/communication devices  Outcome: Progressing     Problem: GASTROINTESTINAL - ADULT  Goal: Minimal or absence of nausea and/or vomiting  Description: INTERVENTIONS:  - Administer IV fluids if ordered to ensure adequate hydration  - Maintain NPO status until nausea and vomiting are resolved  - Nasogastric tube if ordered  - Administer ordered antiemetic medications as needed  - Provide nonpharmacologic comfort measures as appropriate  - Advance diet as tolerated, if ordered  - Consider nutrition services referral to assist patient with adequate nutrition and appropriate food choices  Outcome: Progressing  Goal: Maintains or returns to baseline bowel function  Description: INTERVENTIONS:  - Assess bowel function  - Encourage oral fluids to ensure adequate hydration  - Administer IV fluids if ordered to ensure adequate hydration  -  Administer ordered medications as needed  - Encourage mobilization and activity  - Consider nutritional services referral to assist patient with adequate nutrition and appropriate food choices  Outcome: Progressing  Goal: Maintains adequate nutritional intake  Description: INTERVENTIONS:  - Monitor percentage of each meal consumed  - Identify factors contributing to decreased intake, treat as appropriate  - Assist with meals as needed  - Monitor I&O, weight, and lab values if indicated  - Obtain nutrition services referral as needed  Outcome: Progressing     Problem: GENITOURINARY - ADULT  Goal: Maintains or returns to baseline urinary function  Description: INTERVENTIONS:  - Assess urinary function  - Encourage oral fluids to ensure adequate hydration if ordered  - Administer IV fluids as ordered to ensure adequate hydration  - Administer ordered medications as needed  - Offer frequent toileting  - Follow urinary retention protocol if ordered  Outcome: Progressing  Goal: Absence of urinary retention  Description: INTERVENTIONS:  - Assess patient’s ability to void and empty bladder  - Monitor I/O  - Bladder scan as needed  - Discuss with physician/AP medications to alleviate retention as needed  - Discuss catheterization for long term situations as appropriate  Outcome: Progressing  Goal: Urinary catheter remains patent  Description: INTERVENTIONS:  - Assess patency of urinary catheter  - If patient has a chronic dela cruz, consider changing catheter if non-functioning  - Follow guidelines for intermittent irrigation of non-functioning urinary catheter  Outcome: Progressing     Problem: METABOLIC, FLUID AND ELECTROLYTES - ADULT  Goal: Electrolytes maintained within normal limits  Description: INTERVENTIONS:  - Monitor labs and assess patient for signs and symptoms of electrolyte imbalances  - Administer electrolyte replacement as ordered  - Monitor response to electrolyte replacements, including repeat lab results as  appropriate  - Instruct patient on fluid and nutrition as appropriate  Outcome: Progressing  Goal: Fluid balance maintained  Description: INTERVENTIONS:  - Monitor labs   - Monitor I/O and WT  - Instruct patient on fluid and nutrition as appropriate  - Assess for signs & symptoms of volume excess or deficit  Outcome: Progressing  Goal: Glucose maintained within target range  Description: INTERVENTIONS:  - Monitor Blood Glucose as ordered  - Assess for signs and symptoms of hyperglycemia and hypoglycemia  - Administer ordered medications to maintain glucose within target range  - Assess nutritional intake and initiate nutrition service referral as needed  Outcome: Progressing     Problem: SKIN/TISSUE INTEGRITY - ADULT  Goal: Skin Integrity remains intact(Skin Breakdown Prevention)  Description: Assess:    -Assess extremities for adequate circulation and sensation     Bed Management:  -Have minimal linens on bed & keep smooth, unwrinkled  -Change linens as needed when moist or perspiring      Toileting:  -Offer bedside commode      Activity:    -Encourage activity and walks on unit  -Encourage or provide ROM exercises       Skin Care:  -Avoid use of baby powder, tape, friction and shearing, hot water or constrictive clothing    Outcome: Progressing     Problem: MUSCULOSKELETAL - ADULT  Goal: Maintain or return mobility to safest level of function  Description: INTERVENTIONS:  - Assess patient's ability to carry out ADLs; assess patient's baseline for ADL function and identify physical deficits which impact ability to perform ADLs (bathing, care of mouth/teeth, toileting, grooming, dressing, etc.)  - Assess/evaluate cause of self-care deficits   - Assess range of motion  - Assess patient's mobility  - Assess patient's need for assistive devices and provide as appropriate  - Encourage maximum independence but intervene and supervise when necessary  - Involve family in performance of ADLs  - Assess for home care needs  following discharge   - Consider OT consult to assist with ADL evaluation and planning for discharge  - Provide patient education as appropriate  Outcome: Progressing

## 2024-12-18 NOTE — ASSESSMENT & PLAN NOTE
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     Neurology was consulted due to intermittent room spinning dizziness/lightheadedness, ongoing for several years, and also the feeling that he is going to pass out. Symptoms are intermittent and triggered by movement. No other associated focal neurologic symptoms. Patient has reported this symptoms many times over the last several years and was treated with meclizine and referred to ENT in the past.     MRI brain did not reveal any acute pathology, but did note interval small cerebellar strokes (since last MRI in 2018). This could be contributing to dizziness. See \"history of stroke\" below for plan.     Plan:  - Meclizine PRN  - Recommend IV fluids   - PT/OT  - Vitamin B12 level pending    If low, recommend high dose B12 injections x 3 days   Will follow result  - Outpatient referral to vestibular therapy, ENT   - Reviewed orthostatics- negative.   - Medical management and supportive care per primary team     "

## 2024-12-18 NOTE — ASSESSMENT & PLAN NOTE
History of L corona radiata lacunar infarct, on Plavix and statin. Now with interval small cerebellar strokes since 2018. Concern for cardioembolic etiology for cerebellar strokes.     - Echocardiogram ordered  - Hemoglobin A1c from October 9.0- goal euglycemia  - Lipid panel pending  - Continue Plavix and statin  - Recommend follow up with cardiology for Zio and/or Loop recorder

## 2024-12-18 NOTE — ASSESSMENT & PLAN NOTE
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

## 2024-12-18 NOTE — PLAN OF CARE
Problem: Prexisting or High Potential for Compromised Skin Integrity  Goal: Skin integrity is maintained or improved  Description: INTERVENTIONS:  - Identify patients at risk for skin breakdown  - Assess and monitor skin integrity  - Assess and monitor nutrition and hydration status  - Monitor labs   - Assess for incontinence   - Turn and reposition patient  - Assist with mobility/ambulation  - Relieve pressure over bony prominences  - Avoid friction and shearing  - Provide appropriate hygiene as needed including keeping skin clean and dry  - Evaluate need for skin moisturizer/barrier cream  - Collaborate with interdisciplinary team   - Patient/family teaching  - Consider wound care consult   12/18/2024 1711 by Donita Neely RN  Outcome: Adequate for Discharge  12/18/2024 1150 by Donita Neely RN  Outcome: Progressing     Problem: PAIN - ADULT  Goal: Verbalizes/displays adequate comfort level or baseline comfort level  Description: Interventions:  - Encourage patient to monitor pain and request assistance  - Assess pain using appropriate pain scale  - Administer analgesics based on type and severity of pain and evaluate response  - Implement non-pharmacological measures as appropriate and evaluate response  - Consider cultural and social influences on pain and pain management  - Notify physician/advanced practitioner if interventions unsuccessful or patient reports new pain  12/18/2024 1711 by Donita Neely RN  Outcome: Adequate for Discharge  12/18/2024 1150 by Donita Neely RN  Outcome: Progressing     Problem: INFECTION - ADULT  Goal: Absence or prevention of progression during hospitalization  Description: INTERVENTIONS:  - Assess and monitor for signs and symptoms of infection  - Monitor lab/diagnostic results  - Monitor all insertion sites, i.e. indwelling lines, tubes, and drains  - Monitor endotracheal if appropriate and nasal secretions for changes in amount and color  - [place] appropriate  cooling/warming therapies per order  - Administer medications as ordered  - Instruct and encourage patient and family to use good hand hygiene technique  - Identify and instruct in appropriate isolation precautions for identified infection/condition  12/18/2024 1711 by Donita Neely RN  Outcome: Adequate for Discharge  12/18/2024 1150 by Donita Neely RN  Outcome: Progressing  Goal: Absence of fever/infection during neutropenic period  Description: INTERVENTIONS:  - Monitor WBC    12/18/2024 1711 by Donita Neely RN  Outcome: Adequate for Discharge  12/18/2024 1150 by oDnita Neely RN  Outcome: Progressing     Problem: SAFETY ADULT  Goal: Patient will remain free of falls  Description: INTERVENTIONS:  - Educate patient/family on patient safety including physical limitations  - Instruct patient to call for assistance with activity   - Consult OT/PT to assist with strengthening/mobility   - Keep Call bell within reach  - Keep bed low and locked with side rails adjusted as appropriate  - Keep care items and personal belongings within reach  - Initiate and maintain comfort rounds  - Make Fall Risk Sign visible to staff  - Offer Toileting every 2 Hours, in advance of need  - Initiate/Maintain bed alarm  - Apply yellow socks and bracelet for high fall risk patients  - Consider moving patient to room near nurses station  12/18/2024 1711 by Donita Neely RN  Outcome: Adequate for Discharge  12/18/2024 1150 by Donita Neely RN  Outcome: Progressing  Goal: Maintain or return to baseline ADL function  Description: INTERVENTIONS:  -  Assess patient's ability to carry out ADLs; assess patient's baseline for ADL function and identify physical deficits which impact ability to perform ADLs (bathing, care of mouth/teeth, toileting, grooming, dressing, etc.)  - Assess/evaluate cause of self-care deficits   - Assess range of motion  - Assess patient's mobility; develop plan if impaired  - Assess patient's need for assistive devices  and provide as appropriate  - Encourage maximum independence but intervene and supervise when necessary  - Involve family in performance of ADLs  - Assess for home care needs following discharge   - Consider OT consult to assist with ADL evaluation and planning for discharge  - Provide patient education as appropriate  12/18/2024 1711 by Donita Neely RN  Outcome: Adequate for Discharge  12/18/2024 1150 by Donita Neely RN  Outcome: Progressing  Goal: Maintains/Returns to pre admission functional level  Description: INTERVENTIONS:  - Perform AM-PAC 6 Click Basic Mobility/ Daily Activity assessment daily.  - Set and communicate daily mobility goal to care team and patient/family/caregiver.   - Collaborate with rehabilitation services on mobility goals if consulted  - Perform Range of Motion 2 times a day.  - Reposition patient every 2 hours  - Out of bed to chair 3 times a day   - Out of bed for meals 3 times a day  - Out of bed for toileting  - Record patient progress and toleration of activity level   12/18/2024 1711 by Donita Neely RN  Outcome: Adequate for Discharge  12/18/2024 1150 by Donita Neely RN  Outcome: Progressing     Problem: DISCHARGE PLANNING  Goal: Discharge to home or other facility with appropriate resources  Description: INTERVENTIONS:  - Identify barriers to discharge w/patient and caregiver  - Arrange for needed discharge resources and transportation as appropriate  - Identify discharge learning needs (meds, wound care, etc.)  - Arrange for interpretive services to assist at discharge as needed  - Refer to Case Management Department for coordinating discharge planning if the patient needs post-hospital services based on physician/advanced practitioner order or complex needs related to functional status, cognitive ability, or social support system  12/18/2024 1711 by Donita Neely RN  Outcome: Adequate for Discharge  12/18/2024 1150 by Donita Neely RN  Outcome: Progressing     Problem:  Knowledge Deficit  Goal: Patient/family/caregiver demonstrates understanding of disease process, treatment plan, medications, and discharge instructions  Description: Complete learning assessment and assess knowledge base.  Interventions:  - Provide teaching at level of understanding  - Provide teaching via preferred learning methods  12/18/2024 1711 by Donita Neely RN  Outcome: Adequate for Discharge  12/18/2024 1150 by Donita Neely RN  Outcome: Progressing     Problem: Nutrition/Hydration-ADULT  Goal: Nutrient/Hydration intake appropriate for improving, restoring or maintaining nutritional needs  Description: Monitor and assess patient's nutrition/hydration status for malnutrition. Collaborate with interdisciplinary team and initiate plan and interventions as ordered.  Monitor patient's weight and dietary intake as ordered or per policy. Utilize nutrition screening tool and intervene as necessary. Determine patient's food preferences and provide high-protein, high-caloric foods as appropriate.     INTERVENTIONS:  - Monitor oral intake, urinary output, labs, and treatment plans  - Assess nutrition and hydration status and recommend course of action  - Evaluate amount of meals eaten  - Assist patient with eating if necessary   - Allow adequate time for meals  - Recommend/ encourage appropriate diets, oral nutritional supplements, and vitamin/mineral supplements  - Order, calculate, and assess calorie counts as needed  - Recommend, monitor, and adjust tube feedings and TPN/PPN based on assessed needs  - Assess need for intravenous fluids  - Provide specific nutrition/hydration education as appropriate  - Include patient/family/caregiver in decisions related to nutrition  12/18/2024 1711 by Donita Neely RN  Outcome: Adequate for Discharge  12/18/2024 1150 by Donita Neely RN  Outcome: Progressing     Problem: NEUROSENSORY - ADULT  Goal: Achieves maximal functionality and self care  Description: INTERVENTIONS  -  Monitor swallowing and airway patency with patient fatigue and changes in neurological status  - Encourage and assist patient to increase activity and self care.   - Encourage visually impaired, hearing impaired and aphasic patients to use assistive/communication devices  12/18/2024 1711 by Donita Neely RN  Outcome: Adequate for Discharge  12/18/2024 1150 by Donita Neely RN  Outcome: Progressing     Problem: GASTROINTESTINAL - ADULT  Goal: Minimal or absence of nausea and/or vomiting  Description: INTERVENTIONS:  - Administer IV fluids if ordered to ensure adequate hydration  - Maintain NPO status until nausea and vomiting are resolved  - Nasogastric tube if ordered  - Administer ordered antiemetic medications as needed  - Provide nonpharmacologic comfort measures as appropriate  - Advance diet as tolerated, if ordered  - Consider nutrition services referral to assist patient with adequate nutrition and appropriate food choices  12/18/2024 1711 by Donita Neely RN  Outcome: Adequate for Discharge  12/18/2024 1150 by Donita Neely RN  Outcome: Progressing  Goal: Maintains or returns to baseline bowel function  Description: INTERVENTIONS:  - Assess bowel function  - Encourage oral fluids to ensure adequate hydration  - Administer IV fluids if ordered to ensure adequate hydration  - Administer ordered medications as needed  - Encourage mobilization and activity  - Consider nutritional services referral to assist patient with adequate nutrition and appropriate food choices  12/18/2024 1711 by Donita Neely RN  Outcome: Adequate for Discharge  12/18/2024 1150 by Donita Neely RN  Outcome: Progressing  Goal: Maintains adequate nutritional intake  Description: INTERVENTIONS:  - Monitor percentage of each meal consumed  - Identify factors contributing to decreased intake, treat as appropriate  - Assist with meals as needed  - Monitor I&O, weight, and lab values if indicated  - Obtain nutrition services referral as  needed  12/18/2024 1711 by Donita Neely RN  Outcome: Adequate for Discharge  12/18/2024 1150 by Donita Neely RN  Outcome: Progressing     Problem: GENITOURINARY - ADULT  Goal: Maintains or returns to baseline urinary function  Description: INTERVENTIONS:  - Assess urinary function  - Encourage oral fluids to ensure adequate hydration if ordered  - Administer IV fluids as ordered to ensure adequate hydration  - Administer ordered medications as needed  - Offer frequent toileting  - Follow urinary retention protocol if ordered  12/18/2024 1711 by Donita Neely RN  Outcome: Adequate for Discharge  12/18/2024 1150 by Donita Neely RN  Outcome: Progressing  Goal: Absence of urinary retention  Description: INTERVENTIONS:  - Assess patient’s ability to void and empty bladder  - Monitor I/O  - Bladder scan as needed  - Discuss with physician/AP medications to alleviate retention as needed  - Discuss catheterization for long term situations as appropriate  12/18/2024 1711 by Donita Neely RN  Outcome: Adequate for Discharge  12/18/2024 1150 by Donita Neely RN  Outcome: Progressing  Goal: Urinary catheter remains patent  Description: INTERVENTIONS:  - Assess patency of urinary catheter  - If patient has a chronic dela cruz, consider changing catheter if non-functioning  - Follow guidelines for intermittent irrigation of non-functioning urinary catheter  12/18/2024 1711 by Donita Neely RN  Outcome: Adequate for Discharge  12/18/2024 1150 by Donita Neely RN  Outcome: Progressing     Problem: METABOLIC, FLUID AND ELECTROLYTES - ADULT  Goal: Electrolytes maintained within normal limits  Description: INTERVENTIONS:  - Monitor labs and assess patient for signs and symptoms of electrolyte imbalances  - Administer electrolyte replacement as ordered  - Monitor response to electrolyte replacements, including repeat lab results as appropriate  - Instruct patient on fluid and nutrition as appropriate  12/18/2024 1711 by Donita Neely  RN  Outcome: Adequate for Discharge  12/18/2024 1150 by Donita Neely RN  Outcome: Progressing  Goal: Fluid balance maintained  Description: INTERVENTIONS:  - Monitor labs   - Monitor I/O and WT  - Instruct patient on fluid and nutrition as appropriate  - Assess for signs & symptoms of volume excess or deficit  12/18/2024 1711 by Donita Neely RN  Outcome: Adequate for Discharge  12/18/2024 1150 by Donita Neely RN  Outcome: Progressing  Goal: Glucose maintained within target range  Description: INTERVENTIONS:  - Monitor Blood Glucose as ordered  - Assess for signs and symptoms of hyperglycemia and hypoglycemia  - Administer ordered medications to maintain glucose within target range  - Assess nutritional intake and initiate nutrition service referral as needed  12/18/2024 1711 by Donita Neely RN  Outcome: Adequate for Discharge  12/18/2024 1150 by Donita Neely RN  Outcome: Progressing     Problem: SKIN/TISSUE INTEGRITY - ADULT  Goal: Skin Integrity remains intact(Skin Breakdown Prevention)  Description: Assess:  -Perform Guille assessment every 12 hours       Skin Care:  -Avoid use of baby powder, tape, friction and shearing, hot water or constrictive   12/18/2024 1711 by Donita Neely RN  Outcome: Adequate for Discharge  12/18/2024 1150 by Donita Neely RN  Outcome: Progressing     Problem: MUSCULOSKELETAL - ADULT  Goal: Maintain or return mobility to safest level of function  Description: INTERVENTIONS:  - Assess patient's ability to carry out ADLs; assess patient's baseline for ADL function and identify physical deficits which impact ability to perform ADLs (bathing, care of mouth/teeth, toileting, grooming, dressing, etc.)  - Assess/evaluate cause of self-care deficits   - Assess range of motion  - Assess patient's mobility  - Assess patient's need for assistive devices and provide as appropriate  - Encourage maximum independence but intervene and supervise when necessary  - Involve family in performance of  ADLs  - Assess for home care needs following discharge   - Consider OT consult to assist with ADL evaluation and planning for discharge  - Provide patient education as appropriate  12/18/2024 1711 by Donita Neely RN  Outcome: Adequate for Discharge  12/18/2024 1150 by Donita Neely, RN  Outcome: Progressing

## 2024-12-18 NOTE — PROGRESS NOTES
"Progress Note - Neurology   Name: Mathew Rico 74 y.o. 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     Neurology was consulted due to intermittent room spinning dizziness/lightheadedness, ongoing for several years, and also the feeling that he is going to pass out. Symptoms are intermittent and triggered by movement. No other associated focal neurologic symptoms. Patient has reported this symptoms many times over the last several years and was treated with meclizine and referred to ENT in the past.     MRI brain did not reveal any acute pathology, but did note interval small cerebellar strokes (since last MRI in 2018). This could be contributing to dizziness. See \"history of stroke\" below for plan.     Plan:  - Meclizine PRN  - Recommend IV fluids   - PT/OT  - Vitamin B12 level pending    If low, recommend high dose B12 injections x 3 days   Will follow result  - Outpatient referral to vestibular therapy, ENT   - Reviewed orthostatics- negative.   - Medical management and supportive care per primary team     History of stroke  History of L corona radiata lacunar infarct, on Plavix and statin. Now with interval small cerebellar strokes since 2018. Concern for cardioembolic etiology for cerebellar strokes.     - Echocardiogram ordered  - Hemoglobin A1c from October 9.0- goal euglycemia  - Lipid panel pending  - Continue Plavix and statin  - Recommend follow up with cardiology for Zio and/or Loop recorder     Multiple sclerosis (HCC)  - Follows with [facility]. First diagnosed in 1960s.   - Not on " DMTs  - Significant disease burden in upper thoracic cord, most notably from T3-T5/6  - Baseline deficits include bilateral lower extremity weakness, left sided sensory deficits, and bladder dysfunction.         Neurology Follow Up Recs:   Follow up with the MS team in approximately 6 months. Last seen by Karla Macias PA-C. No testing needed prior to appointment. Message sent to schedulers.   Then would later consider nonurgent follow up with stroke team if needed.       Subjective:   Patient seen and examined with attending.  He continues to note mild dizziness/lightheadedness, and states it is the same as it has been for several years.  No new symptoms overnight.  He was updated on the results of his MRI.        Past Medical History:   Diagnosis Date    Acute laryngitis     Acute nonsuppurative otitis media, unspecified laterality     Arm weakness     Arthritis     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HCC)     Bladder infection     Bronchitis     Constipation     Cough     Diabetes (HCC)     Diabetes mellitus (HCC)     Dizziness     Dysfunction of eustachian tube     Erectile dysfunction of non-organic origin     Fatigue     Glaucoma     Hiatal hernia     Hypertension     Imbalance     Leg muscle spasm     Leukocytosis 04/01/2024    MS (multiple sclerosis) (HCC)     Multiple sclerosis (HCC)     Nephrolithiasis     Neurogenic bladder     No natural teeth     Sinus pain     Spinal stenosis     Strain of thoracic region     Stroke (HCC)     Suprapubic catheter (HCC)      Past Surgical History:   Procedure Laterality Date    APPENDECTOMY      BRAIN SURGERY      Coil placed in aneurysm    CEREBRAL ANEURYSM REPAIR      CYSTOSCOPY      CYSTOSCOPY      CYSTOSCOPY  06/11/2018    CYSTOSCOPY  01/15/2021    EYE SURGERY      transscleral cyclophotocoagulation noncontact YAG laser    IR SUPRAPUBIC CATHETER CHECK/CHANGE/REINSERTION/UPSIZE  3/28/2024    MYRINGOTOMY      with ventilation tube insertion    NY LITHOLAPAXY SMPL/SM <2.5 CM N/A  2019    Procedure: CYSTOSCOPY, holmium laser litholapaxy of bladder stones, EXCHANGE OF SP TUBE;  Surgeon: Javy Jeffries MD;  Location: [facility];  Service: Urology    SUPRAPUBIC CATHETER INSERTION       Family History   Problem Relation Age of Onset    Heart attack Mother     Stroke Mother     Heart attack Father     Anuerysm Father         In Stomach     Aneurysm Father      Social History     Socioeconomic History    Marital status: Single     Spouse name: None    Number of children: None    Years of education: None    Highest education level: None   Occupational History    Occupation:    retired   Tobacco Use    Smoking status: Former     Current packs/day: 0.00     Average packs/day: 0.5 packs/day for 31.0 years (15.5 ttl pk-yrs)     Types: Cigarettes     Start date:      Quit date:      Years since quittin.9    Smokeless tobacco: Never   Vaping Use    Vaping status: Never Used   Substance and Sexual Activity    Alcohol use: Not Currently    Drug use: No    Sexual activity: Not Currently   Other Topics Concern    None   Social History Narrative    ** Merged History Encounter **          Social Drivers of Health     Financial Resource Strain: Not on file   Food Insecurity: No Food Insecurity (2024)    Nursing - Inadequate Food Risk Classification     Worried About Running Out of Food in the Last Year: Never true     Ran Out of Food in the Last Year: Never true     Ran Out of Food in the Last Year: 1   Transportation Needs: No Transportation Needs (2024)    Nursing - Transportation Risk Classification     Lack of Transportation: Not on file     Lack of Transportation: 2   Physical Activity: Not on file   Stress: Not on file   Social Connections: Not on file   Intimate Partner Violence: Unknown (2024)    Nursing IPS     Feels Physically and Emotionally Safe: Not on file     Physically Hurt by Someone: Not on file     Humiliated or Emotionally Abused by Someone:  Not on file     Physically Hurt by Someone: 2     Hurt or Threatened by Someone: 2   Housing Stability: Unknown (2024)    Nursing: Inadequate Housing Risk Classification     Has Housing: Not on file     Worried About Losing Housing: Not on file     Unable to Get Utilities: Not on file     Unable to Pay for Housing in the Last Year: 2     Has Housin         Medications:  All current active meds have been reviewed and current meds:  Scheduled Meds:  Current Facility-Administered Medications   Medication Dose Route Frequency Provider Last Rate    acetaminophen  650 mg Oral Q6H PRN Phan S Prechtel, DO      albuterol  2.5 mg Nebulization Q6H PRN Phna S Prechtel, DO      amLODIPine  10 mg Oral Daily Phan S Prechtel, DO      And    atorvastatin  80 mg Oral Daily Phan S Prechtel, DO      baclofen  5 mg Oral TID Phan S Prechtel, DO      budesonide-formoterol  2 puff Inhalation BID Phan S Prechtel, DO      clopidogrel  75 mg Oral Daily Phan S Prechtel, DO      cyanocobalamin  500 mcg Oral Daily Phan S Prechtel, DO      enoxaparin  40 mg Subcutaneous Daily Phan S Prechtel, DO      gabapentin  300 mg Oral TID Phan S Prechtel, DO      gabapentin  600 mg Oral HS Phan S Prechtel, DO      insulin lispro  1-5 Units Subcutaneous HS Phan S Prechtel, DO      insulin lispro  1-6 Units Subcutaneous TID AC Phan S Prechtel, DO      latanoprost  1 drop Both Eyes HS Phan S Prechtel, DO      lidocaine  1 patch Topical Daily Phan S Prechtel, DO      meclizine  12.5 mg Oral Q8H PRN Phan S Prechtel, DO      melatonin  3 mg Oral HS PRN Phan S Prechtel, DO      methenamine hippurate  1 g Oral BID With Meals Phan S Prechtel, DO      mirtazapine  7.5 mg Oral HS Phan S Prechtel, DO      morphine injection  1 mg Intravenous Q4H PRN Phan S Prechtel, DO      nystatin   Topical BID Beatrice Bergman MD      ondansetron  4 mg Intravenous Q6H PRN Phan S Prechtel,  "DO      pantoprazole  40 mg Oral Daily Before Breakfast Phan KURTZ Precbacilioel, DO      propranolol  60 mg Oral Daily Phan KURTZ Prechtel, DO      senna-docusate sodium  2 tablet Oral HS Phan KURTZ Prechtel, DO      traMADol  50 mg Oral Q6H PRN Phan KURTZ Prechtel, DO       Continuous Infusions:   PRN Meds:.  acetaminophen    albuterol    meclizine    melatonin    morphine injection    ondansetron    traMADol       ROS:   Review of Systems   Neurological:  Positive for dizziness, weakness and light-headedness.             Vitals:   /55   Pulse 90   Temp 97.6 °F (36.4 °C)   Resp 18   Ht 5' 5\" (1.651 m)   Wt 67.1 kg (147 lb 14.9 oz)   SpO2 96%   BMI 24.62 kg/m²     Physical Exam  Vitals and nursing note reviewed.   Constitutional:    No acute distress. Nontoxic, no diaphoresis.   HENT:   Normocephalic and atraumatic. Nose normal.   Eyes:   No scleral icterus.   No discharge.      Extraocular Movements: No nystagmus.   Exotropia present  Pulmonary:   Effort is normal. No respiratory distress.   Neurological:   Detailed below  Psychiatric:      Mood normal. No agitation or hallucinations.        Neurological Exam  Mental Status  Awake, alert and oriented. Speech is normal, but hypophonic.     Cranial Nerves  Exotropia present. EOMs intact. No nystagmus.   Face is symmetric.  Hearing intact to voice     Motor   UE 5/5 B/L  Limited movement in B/L LE at baseline. Spastic.     Coordination  Finger-to-nose normal on R, slightly clumsy on L.   Heel to shin: Unable to assess due to weakness.    Reflexes  Symmetric, normal in upper extremities.   Brisk in lower extremities bilaterally.     Gait  Deferred due to baseline nonambulatory status .      Labs: I have personally reviewed pertinent reports.   Recent Results (from the past 24 hours)   Fingerstick Glucose (POCT)    Collection Time: 12/17/24 11:17 AM   Result Value Ref Range    POC Glucose 263 (H) 65 - 140 mg/dl   Fingerstick Glucose (POCT)    Collection Time: " 12/17/24  4:02 PM   Result Value Ref Range    POC Glucose 141 (H) 65 - 140 mg/dl   Fingerstick Glucose (POCT)    Collection Time: 12/17/24  8:33 PM   Result Value Ref Range    POC Glucose 263 (H) 65 - 140 mg/dl   Fingerstick Glucose (POCT)    Collection Time: 12/18/24  6:23 AM   Result Value Ref Range    POC Glucose 161 (H) 65 - 140 mg/dl       Imaging: I have personally reviewed the images and reports for the following studies: MRI brain      EKG, Pathology, and Other Studies: I have personally reviewed pertinent reports.       VTE Prophylaxis: VTE covered by:  enoxaparin, Subcutaneous, 40 mg at 12/18/24 0929

## 2024-12-18 NOTE — NURSING NOTE
Received patient to my care at 1030. Pt in room resting, this RN assessed patient. Pt in bed with call bell within reach. Care ongoing.

## 2024-12-18 NOTE — CASE MANAGEMENT
Case Management Discharge Planning Note    Patient name Mathew Rico  Location [facility] M* MRN [id number]  : 1950 Date 2024       Current Admission Date: 2024  Current Admission Diagnosis:Stercoral colitis   Patient Active Problem List    Diagnosis Date Noted Date Diagnosed    History of stroke 2024     Bladder wall thickening 2024     Pain of left hip 2024     Left leg pain 2024     Dizziness 2024     Pruritus 2024     Asymptomatic bacteriuria 2024     Blurred vision, bilateral 2024     Symptoms of upper respiratory infection (URI) 06/15/2024     Chest pain 2024     Acute encephalopathy 2024     GERSON on CPAP 2024     Fall 2024     Primary hypertension 2024     Type 2 diabetes mellitus without complication, without long-term current use of insulin ([facility]) 2024     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HCC) 2024     Multiple sclerosis (HCC) 2024     Urinary retention 2024     Neck pain 2024     Vitamin B deficiency 2024     Generalized weakness 2024     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     Fall 2023     Weakness 2023     Accidental fall from chair 2023     Constipation 2023     Chronic diastolic congestive heart failure (HCC) 2023     Hyponatremia 2023     Excessive daytime sleepiness 2022     Shortness of breath 2022     Pancreatic mass 2020     Pancreatic lesion 2020     Bladder stones 2019     Bladder neck contracture 2019     History of CVA (cerebrovascular accident) 10/21/2018     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HCC) 2017     Diabetic neuropathy (HCC) 2016     Neurogenic bladder 2016     Thyroid nodule 2015     Cervical spinal stenosis 2014     Generalized anxiety disorder 2014     Obstructive sleep apnea 2013     Benign colon polyp 2012     Esophageal  reflux 06/19/2012     Fatty liver 06/19/2012     Glaucoma 06/19/2012     Hyperlipidemia 06/19/2012     Multiple sclerosis (HCC) 06/19/2012     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) 06/19/2012     Primary hypertension 06/11/2012       LOS (days): 4  Geometric Mean LOS (GMLOS) (days): 2.5  Days to GMLOS:-1.5     OBJECTIVE:  Risk of Unplanned Readmission Score: 55.62         Current admission status: Inpatient   Preferred Pharmacy:   [id number] [place] - [address]  [address] [facility] [address]  Phone: [phone] Fax: [phone]    [place], [place] - [address]  [address] [facility] [address]  Phone: [phone] Fax: [phone]    [facility] [place], [place] - [address],  [address]  [address] [facility] [address]  Phone: [phone] Fax: [phone]     [facility] [place], [place] - [address]  [address] [place] [address]  Phone: [phone] Fax: [phone]    Primary Care Provider: Carolina Kumari MD    Primary Insurance: [facility]  Secondary Insurance:     DISCHARGE DETAILS:                           Contacts  Patient Contacts: Guzman Rico  Relationship to Patient:: Family  Contact Method: Phone  Phone Number: [phone]  Reason/Outcome: Discharge Planning                        Treatment Team Recommendation: Home  Discharge Destination Plan:: Home (with continued care of [facility])  Transport at Discharge : [facility]     Number/Name of Dispatcher: Roundtrip  Transported by (Company and Unit #): [employer]  ETA of Transport (Date): 12/18/24  ETA of Transport (Time): 1700

## 2024-12-18 NOTE — ASSESSMENT & PLAN NOTE
MRI showing cerebellar strokes that may also be contributing to his dizziness  TTE performed  Will need outpatient follow-up with Cardiology for zio-patch/loop recorder  Continue plavix and statin  Rest of plan per Neurology

## 2024-12-18 NOTE — ASSESSMENT & PLAN NOTE
- Follows with [facility]. First diagnosed in 1960s.   - Not on DMTs  - Significant disease burden in upper thoracic cord, most notably from T3-T5/6  - Baseline deficits include bilateral lower extremity weakness, left sided sensory deficits, and bladder dysfunction.

## 2024-12-18 NOTE — DISCHARGE SUMMARY
Discharge Summary - Hospitalist   Name: Mathew Rico 74 y.o. male I MRN: [id number]  Unit/Bed#: [place] [address] -02 I Date of Admission: 12/14/2024   Date of Service: 12/18/2024 I Hospital Day: 4     Assessment & Plan  Stercoral colitis  Patient has issues with chronic constipation presents with severe abdominal pain thought to be due to stercoral colitis and constipation  Bowel regimen increased, currently having good bowel movements with improvement in abdominal pain  Continue miralax twice daily then titrate accordingly  Asymptomatic bacteriuria  Patient asymptomatic, urine cultures growing 80,000-89,000 CFU MDR Enterobacter cloacae  Given this was drawn from chronic suprapubic cath and patient with no other clinical findings of infection, will discontinue antbiotics and monitor off antibiotics for now  Multiple sclerosis (HCC)  Has chronic urinary retention requiring a suprapubic urine catheter  Will need to follow-up with Neuro-MS specialists in 6 months  Type 2 diabetes mellitus without complication, without long-term current use of insulin ([facility])  Lab Results   Component Value Date    HGBA1C 9.0 (H) 10/06/2024       Recent Labs     12/17/24  2033 12/18/24  0623 12/18/24  1126 12/18/24  1545   POCGLU 263* 161* 240* 271*     Holding his outpatient Jardiance and metformin  His blood sugars are well-controlled with the Humalog insulin sliding scale here in the hospital  Neurogenic bladder  Has a chronic suprapubic catheter.  It was not draining much when he came in.  This may have also contributed to his abdominal pain.  Urology is working on it and have been flushing the catheter and it is now draining  Bladder wall thickening  Per Urology, general urologic radiographic findings seen in radiation, bladder outlet obstruction, infection and multiple diagnoses.  No evidence of bulky lymphadenopathy or suspicious masses or lesions.  Gold standard cystoscopic evaluation performed by Dr. Javy Jeffries earlier this  year, without evidence of masses or lesions.  No further urologic intervention  Dizziness  Patient complains of 2-3 week history of dizziness when moving his head or changing positions  Suspect vertigo however given his h/o stroke and basilar artery aneurysm s/p stent assisted coil embolization, MS, Neurology consulted  MRI brain showing interval development of cerebellar strokes  TTE reviewed  Orthostatics negative  Vitamin B12 normal  Continue prn Meclizine  Outpatient follow-up with ENT  Outpatient follow-up with Neuro-MS  Outpatient follow-up with PT for vestibular therapy  History of stroke  MRI showing cerebellar strokes that may also be contributing to his dizziness  TTE performed  Will need outpatient follow-up with Cardiology for zio-patch/loop recorder  Continue plavix and statin  Rest of plan per Neurology     Medical Problems       Resolved Problems  Date Reviewed: 10/10/2024   None       Discharging Physician / Practitioner: Beatrice Plunkett MD  PCP: Carolina Kumari MD  Admission Date:   Admission Orders (From admission, onward)       Ordered        12/14/24 0856  INPATIENT ADMISSION  Once                          Discharge Date: 12/18/24    Consultations During Hospital Stay:  Neurology  Urology    Procedures Performed:   none    Significant Findings / Test Results:   MRI brain wo contrast   Final Result by E. Alec Schoenberger, MD (12/17 2201)      No acute intracranial pathology.   Nonspecific white matter changes that may be due to combination of chronic demyelinating disease and chronic microangiopathy.      Workstation performed: [id number]         CT abdomen pelvis with contrast   Final Result by Sandy Martínez MD (12/14 7621)      Findings consistent with acute cystitis.      Constipation, probable fecal impaction, and mild circumferential wall thickening of the rectum with mild presacral edema; developing stercoral proctitis is not excluded.      The study was marked in  EPIC for immediate notification.         Workstation performed: [id number]         XR chest 1 view portable   ED Interpretation by Brendon Pepper DO (12/14 7687)   No acute cardiopulmonary disease      Final Result by Chance Oates MD (12/14 5241)      No acute cardiopulmonary disease.            Resident: Christopher Griffith I, the attending radiologist, have reviewed the images and agree with the final report above.      Workstation performed: [id number]           TTE  Left Ventricle: Left ventricular cavity size is normal. Wall thickness is normal. The left ventricular ejection fraction is 65% by visual estimation. Systolic function is normal. Although no diagnostic regional wall motion abnormality was identified, this possibility cannot be completely excluded on the basis of this study. Diastolic function is normal for age. Left atrial filling pressure is normal.     Incidental Findings:   N/A    Test Results Pending at Discharge (will require follow up):   none     Outpatient Tests Requested:  Loop recorder/ziopatch    Complications:  none    Reason for Admission: stercoral colitis    Hospital Course:   Mathew Rico is a 74 y.o. male patient who originally presented to the hospital on 12/14/2024 due to constipation and abdominal pain. Found to have stercoral colitis on imaging. Started on aggressive bowel regimen with subsequent resolution of constipation. Patient now having good bowel movements. During this admission, the patient had endorsed worsening dizziness over the last 2-3 weeks. Although patient with history of vertigo, Neuro was consulted given history of stroke given patient stating that symptoms were worsening. MRI was performed showing small chronic lacunar infarcts in the cerebellum new from last MRI in 2018. Given concern for cardioembolic source, TTE was done. Recommendation was made to continue plavix and statin. Patient will need outpatient follow-up with Cardiology for  "loop-recorder and ziopatch placement. Patient will also have to follow-up with ENT and PT for vertigo and vestibular therapy.     Please see above list of diagnoses and related plan for additional information.     Condition at Discharge: stable    Discharge Day Visit / Exam:   Subjective:  Patient seen and examined at bedside. States that he is ready to go home and very eager to do so. Still has the dizziness that is coming and going.    Vitals: Blood Pressure: (!) 127/40 (12/18/24 1535)  Pulse: 73 (12/18/24 1535)  Temperature: 98.3 °F (36.8 °C) (12/18/24 1535)  Temp Source: Oral (12/17/24 2035)  Respirations: 18 (12/17/24 2035)  Height: 5' 5\" (165.1 cm) (12/18/24 1400)  Weight - Scale: 66.7 kg (147 lb) (12/18/24 1400)  SpO2: 96 % (12/18/24 1535)    Physical Exam  Vitals and nursing note reviewed.   Constitutional:       General: He is not in acute distress.     Appearance: He is well-developed.   HENT:      Head: Normocephalic and atraumatic.   Eyes:      Conjunctiva/sclera: Conjunctivae normal.   Cardiovascular:      Rate and Rhythm: Normal rate and regular rhythm.      Heart sounds: No murmur heard.  Pulmonary:      Effort: Pulmonary effort is normal. No respiratory distress.      Breath sounds: Normal breath sounds.   Abdominal:      Palpations: Abdomen is soft.      Tenderness: There is no abdominal tenderness.   Musculoskeletal:         General: No swelling.      Cervical back: Neck supple.   Skin:     General: Skin is warm and dry.      Capillary Refill: Capillary refill takes less than 2 seconds.   Neurological:      Mental Status: He is alert and oriented to person, place, and time. Mental status is at baseline.   Psychiatric:         Mood and Affect: Mood normal.            Discussion with Family: Updated  (brother) via phone.    Discharge instructions/Information to patient and family:   See after visit summary for information provided to patient and family.      Provisions for Follow-Up " Care:  See after visit summary for information related to follow-up care and any pertinent home health orders.      Mobility at time of Discharge:   Basic Mobility Inpatient Raw Score: 7  JH-HLM Goal: 2: Bed activities/Dependent transfer  JH-HLM Achieved: 2: Bed activities/Dependent transfer  HLM Goal achieved. Continue to encourage appropriate mobility.     Disposition:   Home with VNA Services (Reminder: Complete face to face encounter)    Planned Readmission: none    Discharge Medications:  See after visit summary for reconciled discharge medications provided to patient and/or family.      Administrative Statements   Discharge Statement:  I have spent a total time of 60 minutes in caring for this patient on the day of the visit/encounter. >30 minutes of time was spent on: Diagnostic results, Impressions, Counseling / Coordination of care, Documenting in the medical record, Reviewing / ordering tests, medicine, procedures  , and Communicating with other healthcare professionals .    **Please Note: This note may have been constructed using a voice recognition system**

## 2024-12-18 NOTE — ASSESSMENT & PLAN NOTE
Lab Results   Component Value Date    HGBA1C 9.0 (H) 10/06/2024       Recent Labs     12/17/24 2033 12/18/24  0623 12/18/24  1126 12/18/24  1545   POCGLU 263* 161* 240* 271*     Holding his outpatient Jardiance and metformin  His blood sugars are well-controlled with the Humalog insulin sliding scale here in the hospital

## 2024-12-18 NOTE — ASSESSMENT & PLAN NOTE
Has chronic urinary retention requiring a suprapubic urine catheter  Will need to follow-up with Neuro-MS specialists in 6 months

## 2024-12-18 NOTE — DISCHARGE INSTR - AVS FIRST PAGE
Please take all your medications as prescribed  Please adhere to your bowel regimen to prevent constipation and stercoral colitis from recurring  You will need to follow-up with Cardiology for zio-patch/loop recorder placement to monitor the rhythm of your heart and see if any abnormalities are increasing your risk for stroke  You will need to follow-up with ENT for your vertigo  You will need to follow-up with Neurology - Multiple sclerosis specialist for your MS in 6 months

## 2024-12-18 NOTE — PLAN OF CARE
Problem: Prexisting or High Potential for Compromised Skin Integrity  Goal: Skin integrity is maintained or improved  Description: INTERVENTIONS:  - Identify patients at risk for skin breakdown  - Assess and monitor skin integrity  - Assess and monitor nutrition and hydration status  - Monitor labs   - Assess for incontinence   - Turn and reposition patient  - Assist with mobility/ambulation  - Relieve pressure over bony prominences  - Avoid friction and shearing  - Provide appropriate hygiene as needed including keeping skin clean and dry  - Evaluate need for skin moisturizer/barrier cream  - Collaborate with interdisciplinary team   - Patient/family teaching  - Consider wound care consult   Outcome: Progressing     Problem: PAIN - ADULT  Goal: Verbalizes/displays adequate comfort level or baseline comfort level  Description: Interventions:  - Encourage patient to monitor pain and request assistance  - Assess pain using appropriate pain scale  - Administer analgesics based on type and severity of pain and evaluate response  - Implement non-pharmacological measures as appropriate and evaluate response  - Consider cultural and social influences on pain and pain management  - Notify physician/advanced practitioner if interventions unsuccessful or patient reports new pain  Outcome: Progressing     Problem: INFECTION - ADULT  Goal: Absence or prevention of progression during hospitalization  Description: INTERVENTIONS:  - Assess and monitor for signs and symptoms of infection  - Monitor lab/diagnostic results  - Monitor all insertion sites, i.e. indwelling lines, tubes, and drains  - Monitor endotracheal if appropriate and nasal secretions for changes in amount and color  - [place] appropriate cooling/warming therapies per order  - Administer medications as ordered  - Instruct and encourage patient and family to use good hand hygiene technique  - Identify and instruct in appropriate isolation precautions for  identified infection/condition  Outcome: Progressing  Goal: Absence of fever/infection during neutropenic period  Description: INTERVENTIONS:  - Monitor WBC    Outcome: Progressing     Problem: DISCHARGE PLANNING  Goal: Discharge to home or other facility with appropriate resources  Description: INTERVENTIONS:  - Identify barriers to discharge w/patient and caregiver  - Arrange for needed discharge resources and transportation as appropriate  - Identify discharge learning needs (meds, wound care, etc.)  - Arrange for interpretive services to assist at discharge as needed  - Refer to Case Management Department for coordinating discharge planning if the patient needs post-hospital services based on physician/advanced practitioner order or complex needs related to functional status, cognitive ability, or social support system  Outcome: Progressing     Problem: Knowledge Deficit  Goal: Patient/family/caregiver demonstrates understanding of disease process, treatment plan, medications, and discharge instructions  Description: Complete learning assessment and assess knowledge base.  Interventions:  - Provide teaching at level of understanding  - Provide teaching via preferred learning methods  Outcome: Progressing     Problem: Nutrition/Hydration-ADULT  Goal: Nutrient/Hydration intake appropriate for improving, restoring or maintaining nutritional needs  Description: Monitor and assess patient's nutrition/hydration status for malnutrition. Collaborate with interdisciplinary team and initiate plan and interventions as ordered.  Monitor patient's weight and dietary intake as ordered or per policy. Utilize nutrition screening tool and intervene as necessary. Determine patient's food preferences and provide high-protein, high-caloric foods as appropriate.     INTERVENTIONS:  - Monitor oral intake, urinary output, labs, and treatment plans  - Assess nutrition and hydration status and recommend course of action  - Evaluate  amount of meals eaten  - Assist patient with eating if necessary   - Allow adequate time for meals  - Recommend/ encourage appropriate diets, oral nutritional supplements, and vitamin/mineral supplements  - Order, calculate, and assess calorie counts as needed  - Recommend, monitor, and adjust tube feedings and TPN/PPN based on assessed needs  - Assess need for intravenous fluids  - Provide specific nutrition/hydration education as appropriate  - Include patient/family/caregiver in decisions related to nutrition  Outcome: Progressing     Problem: NEUROSENSORY - ADULT  Goal: Achieves maximal functionality and self care  Description: INTERVENTIONS  - Monitor swallowing and airway patency with patient fatigue and changes in neurological status  - Encourage and assist patient to increase activity and self care.   - Encourage visually impaired, hearing impaired and aphasic patients to use assistive/communication devices  Outcome: Progressing

## 2024-12-19 LAB
BACTERIA BLD CULT: NORMAL
BACTERIA BLD CULT: NORMAL

## 2024-12-19 NOTE — UTILIZATION REVIEW
NOTIFICATION OF ADMISSION DISCHARGE   This is a Notification of Discharge from [facility]. Please be advised that this patient has been discharge from our facility. Below you will find the admission and discharge date and time including the patient’s disposition.   UTILIZATION REVIEW CONTACT:  Marsha Garcia  Utilization   Network Utilization Review Department  Phone: [phone] x carefully listen to the prompts. All voicemails are confidential.  Email: [email or web address]     ADMISSION INFORMATION  PRESENTATION DATE: 12/14/2024  4:21 AM  OBERVATION ADMISSION DATE: N/A  INPATIENT ADMISSION DATE: 12/14/24  8:56 AM   DISCHARGE DATE: 12/18/2024  5:27 PM   DISPOSITION:Home with Home Health Care    Network Utilization Review Department  ATTENTION: Please call with any questions or concerns to [phone] and carefully listen to the prompts so that you are directed to the right person. All voicemails are confidential.   For Discharge needs, contact Care Management DC Support Team at [phone] opt. 2  Send all requests for admission clinical reviews, approved or denied determinations and any other requests to dedicated fax number below belonging to the campus where the patient is receiving treatment. List of dedicated fax numbers for the Facilities:  FACILITY NAME UR FAX NUMBER   ADMISSION DENIALS (Administrative/Medical Necessity) [phone]   DISCHARGE SUPPORT TEAM ([facility]) [phone]   PARENT CHILD HEALTH (Maternity/NICU/Pediatrics) [phone]   [facility] [phone]   [facility] [phone]   [facility] [phone]   [facility] [phone]   [facility] [phone]   [facility] [phone]   [facility] [phone]   [facility]  [phone]   [facility] [phone]   [facility] [phone]   [facility] [phone]   [facility] [phone]

## 2024-12-19 NOTE — RESTORATIVE TECHNICIAN NOTE
Restorative Technician Note      Patient Name: Mathew Rico     Restorative Tech Visit Date: 12/19/24  Note Type: Mobility  Activity Performed: Range of motion; Dangled; Repositioned  Patient Position at End of Consult: All needs within reach; Supine

## 2024-12-24 NOTE — TELEPHONE ENCOUNTER
Hello,     I have Scheduled with Corey Mckeon, 1/14/2025, 2:30 pm.HFU LONG.     Patient stated he cannot wait 6 months to be seen due to his dizziness and needs to be seen asap...       Thank you,     Lynda     PATIENT IS ASKING FOR HELP WITH SETTING UP HIS TRANSPORTATION..     CALL BACK [phone]          DC- HOME- 12/18/2024    ESTABLISHED WITH , LOV 02/27/2023, COREY MCKEON, LOV 08/28/2023     HFU/ SL ALL/ Dizziness       ----- Message from Merly Khalil PA-C sent at 12/17/2024 10:50 AM EST -----  Regarding: HFU  Follow up with the MS team in approximately 6 months. Last seen by Corey Mckeon PA-C

## 2024-12-25 ENCOUNTER — APPOINTMENT (INPATIENT)
Dept: CT IMAGING | Facility: HOSPITAL | Age: 74
DRG: 698 | End: 2024-12-25
Payer: MEDICARE

## 2024-12-25 ENCOUNTER — HOSPITAL ENCOUNTER (INPATIENT)
Facility: HOSPITAL | Age: 74
LOS: 6 days | Discharge: HOME HEALTH | DRG: 698 | End: 2024-12-31
Attending: EMERGENCY MEDICINE
Payer: MEDICARE

## 2024-12-25 ENCOUNTER — APPOINTMENT (EMERGENCY)
Dept: RADIOLOGY | Facility: HOSPITAL | Age: 74
DRG: 698 | End: 2024-12-25
Payer: MEDICARE

## 2024-12-25 DIAGNOSIS — Z86.73 HISTORY OF STROKE: ICD-10-CM

## 2024-12-25 DIAGNOSIS — R33.9 URINARY RETENTION: ICD-10-CM

## 2024-12-25 DIAGNOSIS — K52.89 STERCORAL COLITIS: ICD-10-CM

## 2024-12-25 DIAGNOSIS — N31.9 NEUROGENIC BLADDER: ICD-10-CM

## 2024-12-25 DIAGNOSIS — R65.20 SEVERE SEPSIS (HCC): Primary | ICD-10-CM

## 2024-12-25 DIAGNOSIS — N39.0 UTI (URINARY TRACT INFECTION): ICD-10-CM

## 2024-12-25 DIAGNOSIS — A41.9 SEVERE SEPSIS (HCC): Primary | ICD-10-CM

## 2024-12-25 LAB
ALBUMIN SERPL BCG-MCNC: 4 G/DL (ref 3.5–5)
ALP SERPL-CCNC: 94 U/L (ref 34–104)
ALT SERPL W P-5'-P-CCNC: 10 U/L (ref 7–52)
ANION GAP SERPL CALCULATED.3IONS-SCNC: 11 MMOL/L (ref 4–13)
APTT PPP: 22 SECONDS (ref 23–34)
AST SERPL W P-5'-P-CCNC: 13 U/L (ref 13–39)
ATRIAL RATE: 100 BPM
BACTERIA UR QL AUTO: ABNORMAL /HPF
BASOPHILS # BLD AUTO: 0.07 THOUSANDS/ÂΜL (ref 0–0.1)
BASOPHILS NFR BLD AUTO: 1 % (ref 0–1)
BILIRUB SERPL-MCNC: 0.49 MG/DL (ref 0.2–1)
BILIRUB UR QL STRIP: NEGATIVE
BUDDING YEAST: PRESENT
BUN SERPL-MCNC: 9 MG/DL (ref 5–25)
CALCIUM SERPL-MCNC: 9.9 MG/DL (ref 8.4–10.2)
CHLORIDE SERPL-SCNC: 99 MMOL/L (ref 96–108)
CLARITY UR: ABNORMAL
CO2 SERPL-SCNC: 25 MMOL/L (ref 21–32)
COLOR UR: YELLOW
CREAT SERPL-MCNC: 0.95 MG/DL (ref 0.6–1.3)
EOSINOPHIL # BLD AUTO: 0.39 THOUSAND/ÂΜL (ref 0–0.61)
EOSINOPHIL NFR BLD AUTO: 4 % (ref 0–6)
ERYTHROCYTE [DISTWIDTH] IN BLOOD BY AUTOMATED COUNT: 15.4 % (ref 11.6–15.1)
FLUAV AG UPPER RESP QL IA.RAPID: NEGATIVE
FLUBV AG UPPER RESP QL IA.RAPID: NEGATIVE
GFR SERPL CREATININE-BSD FRML MDRD: 77 ML/MIN/1.73SQ M
GLUCOSE SERPL-MCNC: 102 MG/DL (ref 65–140)
GLUCOSE SERPL-MCNC: 106 MG/DL (ref 65–140)
GLUCOSE SERPL-MCNC: 113 MG/DL (ref 65–140)
GLUCOSE UR STRIP-MCNC: ABNORMAL MG/DL
HCT VFR BLD AUTO: 46.6 % (ref 36.5–49.3)
HGB BLD-MCNC: 14.8 G/DL (ref 12–17)
HGB UR QL STRIP.AUTO: ABNORMAL
IMM GRANULOCYTES # BLD AUTO: 0.04 THOUSAND/UL (ref 0–0.2)
IMM GRANULOCYTES NFR BLD AUTO: 0 % (ref 0–2)
INR PPP: 0.94 (ref 0.85–1.19)
KETONES UR STRIP-MCNC: ABNORMAL MG/DL
LACTATE SERPL-SCNC: 1.4 MMOL/L (ref 0.5–2)
LACTATE SERPL-SCNC: 2.9 MMOL/L (ref 0.5–2)
LEUKOCYTE ESTERASE UR QL STRIP: ABNORMAL
LYMPHOCYTES # BLD AUTO: 1.41 THOUSANDS/ÂΜL (ref 0.6–4.47)
LYMPHOCYTES NFR BLD AUTO: 14 % (ref 14–44)
MCH RBC QN AUTO: 27.3 PG (ref 26.8–34.3)
MCHC RBC AUTO-ENTMCNC: 31.8 G/DL (ref 31.4–37.4)
MCV RBC AUTO: 86 FL (ref 82–98)
MONOCYTES # BLD AUTO: 1.14 THOUSAND/ÂΜL (ref 0.17–1.22)
MONOCYTES NFR BLD AUTO: 11 % (ref 4–12)
MUCOUS THREADS UR QL AUTO: ABNORMAL
NEUTROPHILS # BLD AUTO: 7.04 THOUSANDS/ÂΜL (ref 1.85–7.62)
NEUTS SEG NFR BLD AUTO: 70 % (ref 43–75)
NITRITE UR QL STRIP: POSITIVE
NON-SQ EPI CELLS URNS QL MICRO: ABNORMAL /HPF
NRBC BLD AUTO-RTO: 0 /100 WBCS
PH UR STRIP.AUTO: 5.5 [PH] (ref 4.5–8)
PLATELET # BLD AUTO: 309 THOUSANDS/UL (ref 149–390)
PLATELET # BLD AUTO: 332 THOUSANDS/UL (ref 149–390)
PMV BLD AUTO: 8.6 FL (ref 8.9–12.7)
PMV BLD AUTO: 9 FL (ref 8.9–12.7)
POTASSIUM SERPL-SCNC: 3.9 MMOL/L (ref 3.5–5.3)
PROCALCITONIN SERPL-MCNC: 0.1 NG/ML
PROT SERPL-MCNC: 8.5 G/DL (ref 6.4–8.4)
PROT UR STRIP-MCNC: ABNORMAL MG/DL
PROTHROMBIN TIME: 12.8 SECONDS (ref 12.3–15)
QRS AXIS: 81 DEGREES
QRSD INTERVAL: 88 MS
QT INTERVAL: 326 MS
QTC INTERVAL: 416 MS
RBC # BLD AUTO: 5.42 MILLION/UL (ref 3.88–5.62)
RBC #/AREA URNS AUTO: ABNORMAL /HPF
SARS-COV+SARS-COV-2 AG RESP QL IA.RAPID: NEGATIVE
SODIUM SERPL-SCNC: 135 MMOL/L (ref 135–147)
SP GR UR STRIP.AUTO: 1.02 (ref 1–1.03)
T WAVE AXIS: 48 DEGREES
UROBILINOGEN UR QL STRIP.AUTO: 0.2 E.U./DL
VENTRICULAR RATE: 98 BPM
WBC # BLD AUTO: 10.09 THOUSAND/UL (ref 4.31–10.16)
WBC #/AREA URNS AUTO: ABNORMAL /HPF
WBC CLUMPS # UR AUTO: PRESENT /UL

## 2024-12-25 PROCEDURE — 85049 AUTOMATED PLATELET COUNT: CPT

## 2024-12-25 PROCEDURE — 83605 ASSAY OF LACTIC ACID: CPT | Performed by: EMERGENCY MEDICINE

## 2024-12-25 PROCEDURE — 99291 CRITICAL CARE FIRST HOUR: CPT | Performed by: EMERGENCY MEDICINE

## 2024-12-25 PROCEDURE — 93005 ELECTROCARDIOGRAM TRACING: CPT

## 2024-12-25 PROCEDURE — 74177 CT ABD & PELVIS W/CONTRAST: CPT

## 2024-12-25 PROCEDURE — 71045 X-RAY EXAM CHEST 1 VIEW: CPT

## 2024-12-25 PROCEDURE — 80053 COMPREHEN METABOLIC PANEL: CPT | Performed by: EMERGENCY MEDICINE

## 2024-12-25 PROCEDURE — 82948 REAGENT STRIP/BLOOD GLUCOSE: CPT

## 2024-12-25 PROCEDURE — 87811 SARS-COV-2 COVID19 W/OPTIC: CPT | Performed by: EMERGENCY MEDICINE

## 2024-12-25 PROCEDURE — 85730 THROMBOPLASTIN TIME PARTIAL: CPT | Performed by: EMERGENCY MEDICINE

## 2024-12-25 PROCEDURE — 99285 EMERGENCY DEPT VISIT HI MDM: CPT

## 2024-12-25 PROCEDURE — 99223 1ST HOSP IP/OBS HIGH 75: CPT

## 2024-12-25 PROCEDURE — 93010 ELECTROCARDIOGRAM REPORT: CPT | Performed by: INTERNAL MEDICINE

## 2024-12-25 PROCEDURE — 87040 BLOOD CULTURE FOR BACTERIA: CPT | Performed by: EMERGENCY MEDICINE

## 2024-12-25 PROCEDURE — 36415 COLL VENOUS BLD VENIPUNCTURE: CPT | Performed by: EMERGENCY MEDICINE

## 2024-12-25 PROCEDURE — 96374 THER/PROPH/DIAG INJ IV PUSH: CPT

## 2024-12-25 PROCEDURE — 85025 COMPLETE CBC W/AUTO DIFF WBC: CPT | Performed by: EMERGENCY MEDICINE

## 2024-12-25 PROCEDURE — 81001 URINALYSIS AUTO W/SCOPE: CPT

## 2024-12-25 PROCEDURE — 85610 PROTHROMBIN TIME: CPT | Performed by: EMERGENCY MEDICINE

## 2024-12-25 PROCEDURE — 84145 PROCALCITONIN (PCT): CPT | Performed by: EMERGENCY MEDICINE

## 2024-12-25 PROCEDURE — 87804 INFLUENZA ASSAY W/OPTIC: CPT | Performed by: EMERGENCY MEDICINE

## 2024-12-25 PROCEDURE — 96361 HYDRATE IV INFUSION ADD-ON: CPT

## 2024-12-25 PROCEDURE — 87086 URINE CULTURE/COLONY COUNT: CPT

## 2024-12-25 RX ORDER — BISACODYL 10 MG
10 SUPPOSITORY, RECTAL RECTAL DAILY PRN
Status: DISCONTINUED | OUTPATIENT
Start: 2024-12-25 | End: 2024-12-31 | Stop reason: HOSPADM

## 2024-12-25 RX ORDER — CROMOLYN SODIUM 5.2 MG
1 AEROSOL, SPRAY WITH PUMP (ML) NASAL 3 TIMES DAILY
Status: DISCONTINUED | OUTPATIENT
Start: 2024-12-25 | End: 2024-12-28 | Stop reason: CLARIF

## 2024-12-25 RX ORDER — ATORVASTATIN CALCIUM 80 MG/1
80 TABLET, FILM COATED ORAL
Status: DISCONTINUED | OUTPATIENT
Start: 2024-12-25 | End: 2024-12-31 | Stop reason: HOSPADM

## 2024-12-25 RX ORDER — LIDOCAINE HYDROCHLORIDE 20 MG/ML
1 JELLY TOPICAL ONCE
Status: COMPLETED | OUTPATIENT
Start: 2024-12-25 | End: 2024-12-25

## 2024-12-25 RX ORDER — PANTOPRAZOLE SODIUM 40 MG/1
40 TABLET, DELAYED RELEASE ORAL
Status: DISCONTINUED | OUTPATIENT
Start: 2024-12-26 | End: 2024-12-31 | Stop reason: HOSPADM

## 2024-12-25 RX ORDER — HEPARIN SODIUM 5000 [USP'U]/ML
5000 INJECTION, SOLUTION INTRAVENOUS; SUBCUTANEOUS EVERY 8 HOURS SCHEDULED
Status: DISCONTINUED | OUTPATIENT
Start: 2024-12-25 | End: 2024-12-31 | Stop reason: HOSPADM

## 2024-12-25 RX ORDER — BACLOFEN 10 MG/1
5 TABLET ORAL 3 TIMES DAILY
Status: DISCONTINUED | OUTPATIENT
Start: 2024-12-25 | End: 2024-12-31 | Stop reason: HOSPADM

## 2024-12-25 RX ORDER — MIRTAZAPINE 7.5 MG/1
7.5 TABLET, FILM COATED ORAL
Status: DISCONTINUED | OUTPATIENT
Start: 2024-12-25 | End: 2024-12-31 | Stop reason: HOSPADM

## 2024-12-25 RX ORDER — BUDESONIDE AND FORMOTEROL FUMARATE DIHYDRATE 160; 4.5 UG/1; UG/1
2 AEROSOL RESPIRATORY (INHALATION) 2 TIMES DAILY
Status: DISCONTINUED | OUTPATIENT
Start: 2024-12-25 | End: 2024-12-31 | Stop reason: HOSPADM

## 2024-12-25 RX ORDER — IBUPROFEN 600 MG/1
600 TABLET, FILM COATED ORAL ONCE
Status: COMPLETED | OUTPATIENT
Start: 2024-12-25 | End: 2024-12-25

## 2024-12-25 RX ORDER — AMOXICILLIN 250 MG
2 CAPSULE ORAL
Status: DISCONTINUED | OUTPATIENT
Start: 2024-12-25 | End: 2024-12-31 | Stop reason: HOSPADM

## 2024-12-25 RX ORDER — METHENAMINE HIPPURATE 1000 MG/1
1 TABLET ORAL 2 TIMES DAILY WITH MEALS
Status: DISCONTINUED | OUTPATIENT
Start: 2024-12-25 | End: 2024-12-31 | Stop reason: HOSPADM

## 2024-12-25 RX ORDER — LATANOPROST 50 UG/ML
1 SOLUTION/ DROPS OPHTHALMIC
Status: DISCONTINUED | OUTPATIENT
Start: 2024-12-25 | End: 2024-12-31 | Stop reason: HOSPADM

## 2024-12-25 RX ORDER — GABAPENTIN 300 MG/1
600 CAPSULE ORAL
Status: DISCONTINUED | OUTPATIENT
Start: 2024-12-26 | End: 2024-12-31 | Stop reason: HOSPADM

## 2024-12-25 RX ORDER — SODIUM CHLORIDE 9 MG/ML
100 INJECTION, SOLUTION INTRAVENOUS CONTINUOUS
Status: DISCONTINUED | OUTPATIENT
Start: 2024-12-25 | End: 2024-12-26

## 2024-12-25 RX ORDER — ALBUTEROL SULFATE 0.83 MG/ML
2.5 SOLUTION RESPIRATORY (INHALATION) EVERY 6 HOURS PRN
Status: DISCONTINUED | OUTPATIENT
Start: 2024-12-25 | End: 2024-12-27

## 2024-12-25 RX ORDER — PROPRANOLOL HYDROCHLORIDE 60 MG/1
60 CAPSULE, EXTENDED RELEASE ORAL DAILY
Status: DISCONTINUED | OUTPATIENT
Start: 2024-12-26 | End: 2024-12-31 | Stop reason: HOSPADM

## 2024-12-25 RX ORDER — CLOPIDOGREL BISULFATE 75 MG/1
75 TABLET ORAL DAILY
Status: DISCONTINUED | OUTPATIENT
Start: 2024-12-26 | End: 2024-12-31 | Stop reason: HOSPADM

## 2024-12-25 RX ORDER — ACETAMINOPHEN 650 MG/1
650 SUPPOSITORY RECTAL ONCE
Status: COMPLETED | OUTPATIENT
Start: 2024-12-25 | End: 2024-12-25

## 2024-12-25 RX ORDER — POLYETHYLENE GLYCOL 3350 17 G/17G
17 POWDER, FOR SOLUTION ORAL 2 TIMES DAILY
Status: DISCONTINUED | OUTPATIENT
Start: 2024-12-25 | End: 2024-12-31 | Stop reason: HOSPADM

## 2024-12-25 RX ORDER — INSULIN LISPRO 100 [IU]/ML
1-5 INJECTION, SOLUTION INTRAVENOUS; SUBCUTANEOUS
Status: DISCONTINUED | OUTPATIENT
Start: 2024-12-25 | End: 2024-12-31 | Stop reason: HOSPADM

## 2024-12-25 RX ORDER — MECLIZINE HCL 12.5 MG 12.5 MG/1
12.5 TABLET ORAL EVERY 8 HOURS PRN
Status: DISCONTINUED | OUTPATIENT
Start: 2024-12-25 | End: 2024-12-31 | Stop reason: HOSPADM

## 2024-12-25 RX ORDER — GABAPENTIN 300 MG/1
300 CAPSULE ORAL 3 TIMES DAILY
Status: DISCONTINUED | OUTPATIENT
Start: 2024-12-25 | End: 2024-12-31 | Stop reason: HOSPADM

## 2024-12-25 RX ORDER — AMLODIPINE BESYLATE 10 MG/1
10 TABLET ORAL DAILY
Status: DISCONTINUED | OUTPATIENT
Start: 2024-12-26 | End: 2024-12-31 | Stop reason: HOSPADM

## 2024-12-25 RX ORDER — ACETAMINOPHEN 325 MG/1
650 TABLET ORAL EVERY 6 HOURS PRN
Status: DISCONTINUED | OUTPATIENT
Start: 2024-12-25 | End: 2024-12-26

## 2024-12-25 RX ADMIN — MIRTAZAPINE 7.5 MG: 7.5 TABLET, FILM COATED ORAL at 21:28

## 2024-12-25 RX ADMIN — GABAPENTIN 300 MG: 300 CAPSULE ORAL at 21:26

## 2024-12-25 RX ADMIN — SODIUM CHLORIDE 500 ML: 0.9 INJECTION, SOLUTION INTRAVENOUS at 11:44

## 2024-12-25 RX ADMIN — IOHEXOL 100 ML: 350 INJECTION, SOLUTION INTRAVENOUS at 15:11

## 2024-12-25 RX ADMIN — POLYETHYLENE GLYCOL 3350 17 G: 17 POWDER, FOR SOLUTION ORAL at 18:02

## 2024-12-25 RX ADMIN — ACETAMINOPHEN 650 MG: 650 SUPPOSITORY RECTAL at 11:55

## 2024-12-25 RX ADMIN — CEFEPIME 2000 MG: 2 INJECTION, POWDER, FOR SOLUTION INTRAVENOUS at 14:13

## 2024-12-25 RX ADMIN — BACLOFEN 5 MG: 10 TABLET ORAL at 21:26

## 2024-12-25 RX ADMIN — HEPARIN SODIUM 5000 UNITS: 5000 INJECTION INTRAVENOUS; SUBCUTANEOUS at 17:00

## 2024-12-25 RX ADMIN — SENNOSIDES AND DOCUSATE SODIUM 2 TABLET: 8.6; 5 TABLET ORAL at 21:26

## 2024-12-25 RX ADMIN — GABAPENTIN 300 MG: 300 CAPSULE ORAL at 18:02

## 2024-12-25 RX ADMIN — BACLOFEN 5 MG: 10 TABLET ORAL at 18:02

## 2024-12-25 RX ADMIN — HEPARIN SODIUM 5000 UNITS: 5000 INJECTION INTRAVENOUS; SUBCUTANEOUS at 21:26

## 2024-12-25 RX ADMIN — LATANOPROST 1 DROP: 50 SOLUTION OPHTHALMIC at 21:26

## 2024-12-25 RX ADMIN — IBUPROFEN 600 MG: 600 TABLET, FILM COATED ORAL at 13:46

## 2024-12-25 RX ADMIN — LIDOCAINE HYDROCHLORIDE 1 APPLICATION: 20 JELLY TOPICAL at 12:41

## 2024-12-25 RX ADMIN — ATORVASTATIN CALCIUM 80 MG: 80 TABLET, FILM COATED ORAL at 18:02

## 2024-12-25 RX ADMIN — SODIUM CHLORIDE 100 ML/HR: 0.9 INJECTION, SOLUTION INTRAVENOUS at 16:03

## 2024-12-25 RX ADMIN — SODIUM CHLORIDE 500 ML: 0.9 INJECTION, SOLUTION INTRAVENOUS at 13:14

## 2024-12-25 NOTE — ASSESSMENT & PLAN NOTE
Patient has chronic suprapubic catheter, changed during this admission in the ED.    -Currently draining well  -If issue with drainage, will consider urology consult.

## 2024-12-25 NOTE — ASSESSMENT & PLAN NOTE
Patient has issues with chronic constipation and had multiple admissions in the past with severe abdominal pain due to sterile coral colitis and constipation.    -Bowel regimen consisting of MiraLAX along with Senokot scheduled  -Will Colace suppository as needed ordered  -Abdominal imaging ordered results pending

## 2024-12-25 NOTE — Clinical Note
Case was discussed with  and the patient's admission status was agreed to be  to the service of Dr. Cloud

## 2024-12-25 NOTE — ASSESSMENT & PLAN NOTE
Patient has chronic urinary retention status post suprapubic catheter.    -Will require outpatient neuro-MS specialist follow-up on discharge

## 2024-12-25 NOTE — H&P
"H&P - Hospitalist   Name: Mathew Rico 74 y.o. male I MRN: [id number]  Unit/Bed#: ED-19 I Date of Admission: 12/25/2024   Date of Service: 12/25/2024 I Hospital Day: 0     Assessment & Plan  Sepsis ([facility])  Patient is a 74-year-old male with past medical history significant but not limited to neurogenic bladder status post SPT, primary hypertension, and as, diabetes mellitus with neuropathy, stercoral colitis, urinary tract infection, history of stroke presented to the ED with complaints of effusion along with fever and tachycardia.  Upon arrival in the ED patient was seen and examined suprapubic catheter was changed and urine sample was obtained which showed positive blood/nitrite/leukocyte, patient also endorsed abdominal pain, fevers deranged blood pressures and tachycardia meeting sepsis criteria.  Patient received 1 L of normal saline bolus, cefepime and admitted for further management.    -Continue with IV antibiotics cefepime de-escalate as per cultures and antibodies.  -Blood cultures urine cultures pending  -Patient on IV fluids for next 14 hours  -Procalcitonin within normal parameters but lactic acidosis 2.9, reflex pending  -Blood workup ordered will continue to monitor  -For abdominal pain endorsed by patient, CT abdomen and pelvis ordered  Neurogenic bladder  Patient has chronic suprapubic catheter, changed during this admission in the ED.    -Currently draining well  -If issue with drainage, will consider urology consult.  Primary hypertension  Continue prior to admission amlodipine  Type 2 diabetes mellitus with neuropathy ([facility])  Lab Results   Component Value Date    HGBA1C 9.0 (H) 10/06/2024       No results for input(s): \"POCGLU\" in the last 72 hours.    Blood Sugar Average: Last 72 hrs:    Patient on metformin and Jardiance currently on hold    -Blood sugar goal 1 40-1 80  -Insulin sliding scale in place  -Ordered consistent diet  Stercoral colitis  Patient has issues with chronic constipation " and had multiple admissions in the past with severe abdominal pain due to sterile coral colitis and constipation.    -Bowel regimen consisting of MiraLAX along with Senokot scheduled  -Will Colace suppository as needed ordered  -Abdominal imaging ordered results pending  Urinary tract infection associated with cystostomy catheter  (HCC)  As above under sepsis  Multiple sclerosis (HCC)  Patient has chronic urinary retention status post suprapubic catheter.    -Will require outpatient neuro-MS specialist follow-up on discharge  History of stroke  Continue prior to admission Plavix and statin    Prior imaging showed cerebellar stroke that might be contributing to patient's dizziness      VTE Pharmacologic Prophylaxis:    Heparin  Code Status: Level 1 - Full Code   Discussion with family:  Tried calling brother as per request patient for couple crackling again.         History of Present Illness   Chief Complaint: Confusion, tachycardia and fever    Mathew Rico is a 74 y.o. male with a PMH significant but not limited to neurogenic bladder status post SPT, primary hypertension, and as, diabetes mellitus with neuropathy, stercoral colitis, urinary tract infection, history of stroke presented to the ED with complaints of effusion along with fever and tachycardia.  Upon arrival in the ED patient was seen and examined suprapubic catheter was changed and urine sample was obtained which showed positive blood/nitrite/leukocyte, patient also endorsed abdominal pain, fevers deranged blood pressures and tachycardia meeting sepsis criteria.  Patient received 1 L of normal saline bolus, cefepime and admitted for further management.      Review of Systems   Constitutional:  Positive for appetite change, fatigue and fever. Negative for chills.   HENT:  Negative for ear pain and sore throat.    Eyes:  Negative for pain and visual disturbance.   Respiratory:  Negative for cough and shortness of breath.    Cardiovascular:  Negative  for chest pain and palpitations.   Gastrointestinal:  Positive for abdominal pain. Negative for vomiting.   Genitourinary:  Negative for dysuria and hematuria.   Musculoskeletal:  Negative for arthralgias and back pain.   Skin:  Negative for color change and rash.   Neurological:  Negative for seizures and syncope.   All other systems reviewed and are negative.      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[facility])     Bladder infection     Bronchitis     Constipation     Cough     Diabetes ([facility])     Diabetes mellitus (HCC)     Dizziness     Dysfunction of eustachian tube     Erectile dysfunction of non-organic origin     Fatigue     Glaucoma     Hiatal hernia     Hypertension     Imbalance     Leg muscle spasm     Leukocytosis 04/01/2024    MS (multiple sclerosis) ([facility])     Multiple sclerosis ([facility])     Nephrolithiasis     Neurogenic bladder     No natural teeth     Sinus pain     Spinal stenosis     Strain of thoracic region     Stroke ([facility])     Suprapubic catheter ([facility])      Past Surgical History:   Procedure Laterality Date    APPENDECTOMY      BRAIN SURGERY      Coil placed in aneurysm    CEREBRAL ANEURYSM REPAIR      CYSTOSCOPY      CYSTOSCOPY      CYSTOSCOPY  06/11/2018    CYSTOSCOPY  01/15/2021    EYE SURGERY      transscleral cyclophotocoagulation noncontact YAG laser    IR SUPRAPUBIC CATHETER CHECK/CHANGE/REINSERTION/UPSIZE  3/28/2024    MYRINGOTOMY      with ventilation tube insertion    ID LITHOLAPAXY SMPL/SM <2.5 CM N/A 5/7/2019    Procedure: CYSTOSCOPY, holmium laser litholapaxy of bladder stones, EXCHANGE OF SP TUBE;  Surgeon: Javy Jeffries MD;  Location: [facility];  Service: Urology    SUPRAPUBIC CATHETER INSERTION       Social History     Tobacco Use    Smoking status: Former     Current packs/day: 0.00     Average packs/day: 0.5 packs/day  for 31.0 years (15.5 ttl pk-yrs)     Types: Cigarettes     Start date:      Quit date:      Years since quittin.0    Smokeless tobacco: Never   Vaping Use    Vaping status: Never Used   Substance and Sexual Activity    Alcohol use: Not Currently    Drug use: No    Sexual activity: Not Currently     E-Cigarette/Vaping    E-Cigarette Use Never User      E-Cigarette/Vaping Substances    Nicotine No     THC No     CBD No     Flavoring No     Other No     Unknown No        Social History:  Marital Status: Single     Meds/Allergies     Prior to Admission medications    Medication Sig Start Date End Date Taking? Authorizing Provider   acetaminophen (TYLENOL) 325 mg tablet Take 2 tablets (650 mg total) by mouth every 6 (six) hours as needed for mild pain 3/29/24  Yes Cristin Booth MD   albuterol (2.5 mg/3 mL) 0.083 % nebulizer solution Take 3 mL (2.5 mg total) by nebulization every 6 (six) hours as needed for wheezing or shortness of breath 10/3/23  Yes Nelson Cabezas MD   amLODIPine-atorvastatin (CADUET) 10-80 MG per tablet Take 1 tablet by mouth daily   Yes Historical Provider, MD   baclofen 10 mg tablet Take 5 mg by mouth 3 (three) times a day   Yes Historical Provider, MD   bisacodyl (DULCOLAX) 10 mg suppository Insert 1 suppository (10 mg total) into the rectum daily as needed for constipation for up to 12 doses 10/10/24  Yes Mary Kemp MD   budesonide-formoterol (SYMBICORT) 160-4.5 mcg/act inhaler Inhale 2 puffs 2 (two) times a day Rinse mouth after use.   Yes Historical Provider, MD   clopidogrel (PLAVIX) 75 mg tablet Take 1 tablet (75 mg total) by mouth daily 10/27/18  Yes Kraig Griffin MD   cromolyn (NASALCHROM) 5.2 MG/ACT nasal spray 1 spray into each nostril 3 (three) times a day 24  Yes Steve Shook DO   cyanocobalamin (VITAMIN B-12) 500 MCG tablet Take 1 tablet (500 mcg total) by mouth daily 24  Yes SHA Sutton   Empagliflozin (JARDIANCE) 10 MG TABS  tablet Take 10 mg by mouth every morning   Yes Historical Provider, MD   Ergocalciferol (VITAMIN D2 PO) Take 50,000 Units by mouth once a week   Yes Historical Provider, MD   gabapentin (NEURONTIN) 300 mg capsule Take 1 capsule (300 mg total) by mouth 3 (three) times a day 12/4/24  Yes Brendon Pepper DO   gabapentin (NEURONTIN) 300 mg capsule Take 2 capsules (600 mg total) by mouth daily at bedtime 12/4/24  Yes Brendon Pepper DO   latanoprost (XALATAN) 0.005 % ophthalmic solution Administer 1 drop to both eyes daily at bedtime   Yes Historical Provider, MD   lidocaine (LIDODERM) 5 % Apply 1 patch topically over 12 hours daily Remove & Discard patch within 12 hours or as directed by MD 4/3/24  Yes SHA Sutton   meclizine (ANTIVERT) 12.5 MG tablet Take 1 tablet (12.5 mg total) by mouth every 8 (eight) hours as needed for dizziness 7/28/24  Yes Miah Alexander MD   melatonin 3 mg Take 3 mg by mouth daily at bedtime as needed   Yes Historical Provider, MD   metFORMIN (GLUCOPHAGE) 1000 MG tablet Take 1,000 mg by mouth 2 (two) times a day with meals   Yes Historical Provider, MD   methenamine hippurate (HIPREX) 1 g tablet Take 1 tablet (1 g total) by mouth 2 (two) times a day with meals 3/27/24  Yes Brianna Ramirez PA-C   mirtazapine (REMERON) 7.5 MG tablet Take 7.5 mg by mouth daily at bedtime   Yes Historical Provider, MD   pantoprazole (PROTONIX) 40 mg tablet Take 40 mg by mouth daily   Yes Historical Provider, MD   polyethylene glycol (MIRALAX) 17 g packet Take 17 g by mouth 2 (two) times a day 6/18/24  Yes Steve Shook DO   propranolol (INDERAL LA) 60 mg 24 hr capsule Take 60 mg by mouth daily   Yes Historical Provider, MD   senna-docusate sodium (SENOKOT S) 8.6-50 mg per tablet Take 2 tablets by mouth daily at bedtime 10/10/24  Yes Mary Kemp MD     Allergies   Allergen Reactions    Cephalexin Rash    Cephalexin Rash       Objective :  Temp:  [100.1 °F (37.8 °C)-102.2 °F (39 °C)] 102  °F (38.9 °C)  HR:  [] 92  BP: ()/(42-80) 108/46  Resp:  [18] 18  SpO2:  [93 %-97 %] 94 %  O2 Device: None (Room air)    Physical Exam  Vitals and nursing note reviewed. Exam conducted with a chaperone present.   Constitutional:       General: He is not in acute distress.     Appearance: He is well-developed.   HENT:      Head: Normocephalic and atraumatic.      Mouth/Throat:      Mouth: Mucous membranes are dry.   Eyes:      Conjunctiva/sclera: Conjunctivae normal.   Cardiovascular:      Rate and Rhythm: Normal rate and regular rhythm.      Heart sounds: No murmur heard.  Pulmonary:      Effort: Pulmonary effort is normal. No respiratory distress.      Breath sounds: Normal breath sounds.   Abdominal:      Palpations: Abdomen is soft.      Tenderness: There is abdominal tenderness.      Comments: Suprapubic catheter in place   Musculoskeletal:         General: No swelling.      Cervical back: Neck supple.   Skin:     General: Skin is warm and dry.      Capillary Refill: Capillary refill takes less than 2 seconds.   Neurological:      Mental Status: He is alert.      Comments: Alert and oriented.   Psychiatric:         Mood and Affect: Mood normal.         Lines/Drains:            Lab Results: I have reviewed the following results:  Results from last 7 days   Lab Units 12/25/24  1453 12/25/24  1133   WBC Thousand/uL  --  10.09   HEMOGLOBIN g/dL  --  14.8   HEMATOCRIT %  --  46.6   PLATELETS Thousands/uL 332 309   SEGS PCT %  --  70   LYMPHO PCT %  --  14   MONO PCT %  --  11   EOS PCT %  --  4     Results from last 7 days   Lab Units 12/25/24  1133   SODIUM mmol/L 135   POTASSIUM mmol/L 3.9   CHLORIDE mmol/L 99   CO2 mmol/L 25   BUN mg/dL 9   CREATININE mg/dL 0.95   ANION GAP mmol/L 11   CALCIUM mg/dL 9.9   ALBUMIN g/dL 4.0   TOTAL BILIRUBIN mg/dL 0.49   ALK PHOS U/L 94   ALT U/L 10   AST U/L 13   GLUCOSE RANDOM mg/dL 106     Results from last 7 days   Lab Units 12/25/24  1133   INR  0.94     Results from  last 7 days   Lab Units 12/18/24  1545   POC GLUCOSE mg/dl 271*     Lab Results   Component Value Date    HGBA1C 9.0 (H) 10/06/2024    HGBA1C 8.0 (H) 06/16/2024    HGBA1C 6.4 (H) 03/26/2024     Results from last 7 days   Lab Units 12/25/24  1133   LACTIC ACID mmol/L 2.9*   PROCALCITONIN ng/ml 0.10       XR chest 1 view portable  Result Date: 12/25/2024  Impression: No acute cardiopulmonary disease. Workstation performed: [id number]         Administrative Statements   I have spent a total time of >75 minutes in caring for this patient on the day of the visit/encounter including Diagnostic results, Prognosis, Risks and benefits of tx options, Instructions for management, Patient and family education, Importance of tx compliance, Risk factor reductions, Impressions, Counseling / Coordination of care, Documenting in the medical record, Reviewing / ordering tests, medicine, procedures  , Obtaining or reviewing history  , and Communicating with other healthcare professionals .    ** Please Note: This note has been constructed using a voice recognition system. **

## 2024-12-25 NOTE — ASSESSMENT & PLAN NOTE
Patient is a 74-year-old male with past medical history significant but not limited to neurogenic bladder status post SPT, primary hypertension, and as, diabetes mellitus with neuropathy, stercoral colitis, urinary tract infection, history of stroke presented to the ED with complaints of effusion along with fever and tachycardia.  Upon arrival in the ED patient was seen and examined suprapubic catheter was changed and urine sample was obtained which showed positive blood/nitrite/leukocyte, patient also endorsed abdominal pain, fevers deranged blood pressures and tachycardia meeting sepsis criteria.  Patient received 1 L of normal saline bolus, cefepime and admitted for further management.    -Continue with IV antibiotics cefepime de-escalate as per cultures and antibodies.  -Blood cultures urine cultures pending  -Patient on IV fluids for next 14 hours  -Procalcitonin within normal parameters but lactic acidosis 2.9, reflex pending  -Blood workup ordered will continue to monitor  -For abdominal pain endorsed by patient, CT abdomen and pelvis ordered

## 2024-12-25 NOTE — ASSESSMENT & PLAN NOTE
Continue prior to admission Plavix and statin    Prior imaging showed cerebellar stroke that might be contributing to patient's dizziness

## 2024-12-25 NOTE — ASSESSMENT & PLAN NOTE
"Lab Results   Component Value Date    HGBA1C 9.0 (H) 10/06/2024       No results for input(s): \"POCGLU\" in the last 72 hours.    Blood Sugar Average: Last 72 hrs:    Patient on metformin and Jardiance currently on hold    -Blood sugar goal 1 40-1 80  -Insulin sliding scale in place  -Ordered consistent diet  "

## 2024-12-25 NOTE — ED PROVIDER NOTES
Time reflects when diagnosis was documented in both MDM as applicable and the Disposition within this note       Time User Action Codes Description Comment    12/25/2024  1:58 PM Payton Mcelroy Add [A41.9,  R65.20] Severe sepsis (HCC)     12/25/2024  1:58 PM Payton Mcelroy Add [N39.0] UTI (urinary tract infection)     12/25/2024  2:48 PM Annemarie Mello Add [Z86.73] History of stroke           ED Disposition       ED Disposition   Admit    Condition   Stable    Date/Time   Wed Dec 25, 2024  2:15 PM    Comment   Case was discussed with Dr. Mello and the patient's admission status was agreed to be inpatient to the service of Dr. Mello               Assessment & Plan       Medical Decision Making  74y M w/ MS, neurogenic bladder w/ SPT, and mult other chronic medical conditions here w/ fatigue, malaise, headache and fever.     Pt w/ two SIRS upon presentation - will initiate sepsis eval including Will get EKG to r/o arrhythmia, ischemic changes.  Will get labs to r/o acute life threatening metabolic abnl, cardiac ischemia, significant leukocytosis / anemia.  Will get CXR to r/o occult pna, will get viral swab to r/o covid/flu      Problems Addressed:  Severe sepsis (HCC): acute illness or injury that poses a threat to life or bodily functions  UTI (urinary tract infection): acute illness or injury    Amount and/or Complexity of Data Reviewed  External Data Reviewed: notes.     Details: Recent admission/dc reviewed (12/14-12/18)  Labs: ordered. Decision-making details documented in ED Course.  Radiology: ordered.  ECG/medicine tests: ordered and independent interpretation performed.    Risk  OTC drugs.  Prescription drug management.  Decision regarding hospitalization.        ED Course as of 12/25/24 1627   Wed Dec 25, 2024   1155 Pt w/ two SIRS (temp, HR)    Will await remainder of sepsis w/u and try to determine source   1155 Immature Grans %: 0   1156 WBC: 10.09   1231 FLU/COVID Rapid Antigen (30 min. TAT) -  Preferred screening test in ED  All negative   1231 LACTIC ACID(!): 2.9  No obvious source    Will place fresh SPT and obtain urine for evaluation   1355 Nitrite, UA(!): Positive  Will treat for severe spsis       Medications   acetaminophen (TYLENOL) tablet 650 mg (has no administration in time range)   cefepime (MAXIPIME) 2 g/50 mL dextrose IVPB (has no administration in time range)   heparin (porcine) subcutaneous injection 5,000 Units (has no administration in time range)   albuterol inhalation solution 2.5 mg (has no administration in time range)   amLODIPine (NORVASC) tablet 10 mg (has no administration in time range)     And   atorvastatin (LIPITOR) tablet 80 mg (has no administration in time range)   baclofen tablet 5 mg (has no administration in time range)   bisacodyl (DULCOLAX) rectal suppository 10 mg (has no administration in time range)   budesonide-formoterol (SYMBICORT) 160-4.5 mcg/act inhaler 2 puff (has no administration in time range)   clopidogrel (PLAVIX) tablet 75 mg (has no administration in time range)   cromolyn (NASALCHROM) nasal spray 1 spray (has no administration in time range)   cyanocobalamin (VITAMIN B-12) tablet 500 mcg (has no administration in time range)   gabapentin (NEURONTIN) capsule 300 mg (has no administration in time range)   gabapentin (NEURONTIN) capsule 600 mg ( Oral Held by provider 12/25/24 0958)   latanoprost (XALATAN) 0.005 % ophthalmic solution 1 drop (has no administration in time range)   meclizine (ANTIVERT) tablet 12.5 mg (has no administration in time range)   melatonin tablet 3 mg (has no administration in time range)   methenamine hippurate (HIPREX) tablet 1 g (has no administration in time range)   mirtazapine (REMERON) tablet 7.5 mg (has no administration in time range)   pantoprazole (PROTONIX) EC tablet 40 mg (has no administration in time range)   polyethylene glycol (MIRALAX) packet 17 g (has no administration in time range)   propranolol (INDERAL LA)  24 hr capsule 60 mg (has no administration in time range)   senna-docusate sodium (SENOKOT S) 8.6-50 mg per tablet 2 tablet (has no administration in time range)   sodium chloride 0.9 % infusion (has no administration in time range)   insulin lispro (HumALOG/ADMELOG) 100 units/mL subcutaneous injection 1-5 Units (has no administration in time range)   acetaminophen (TYLENOL) rectal suppository 650 mg (650 mg Rectal Given 12/25/24 1155)   sodium chloride 0.9 % bolus 500 mL (0 mL Intravenous Stopped 12/25/24 1245)   lidocaine (URO-JET) 2 % urethral/mucosal gel 1 Application (1 Application Urethral Given by Other 12/25/24 1241)   sodium chloride 0.9 % bolus 500 mL (0 mL Intravenous Stopped 12/25/24 1412)   ibuprofen (MOTRIN) tablet 600 mg (600 mg Oral Given 12/25/24 1346)   cefepime (MAXIPIME) 2 g/50 mL dextrose IVPB (0 mg Intravenous Stopped 12/25/24 1451)       ED Risk Strat Scores                          SBIRT 22yo+      Flowsheet Row Most Recent Value   Initial Alcohol Screen: US AUDIT-C     1. How often do you have a drink containing alcohol? 0 Filed at: 12/25/2024 1100   2. How many drinks containing alcohol do you have on a typical day you are drinking?  0 Filed at: 12/25/2024 1100   3a. Male UNDER 65: How often do you have five or more drinks on one occasion? 0 Filed at: 12/25/2024 1100   3b. FEMALE Any Age, or MALE 65+: How often do you have 4 or more drinks on one occassion? 0 Filed at: 12/25/2024 1100   Audit-C Score 0 Filed at: 12/25/2024 1100   ALPESH: How many times in the past year have you...    Used an illegal drug or used a prescription medication for non-medical reasons? Never Filed at: 12/25/2024 1100                            History of Present Illness       Chief Complaint   Patient presents with    Altered Mental Status     Pt arrives via ems, from home. Per ems pt has been more confused, lethargic, complaining of HA, leg pain, fever of 100.1. Pt unable to answer orientation questions.         Past Medical History:   Diagnosis Date    Acute laryngitis     Acute nonsuppurative otitis media, unspecified laterality     Arm weakness     Arthritis     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HCC)     Bladder infection     Bronchitis     Constipation     Cough     Diabetes (HCC)     Diabetes mellitus (HCC)     Dizziness     Dysfunction of eustachian tube     Erectile dysfunction of non-organic origin     Fatigue     Glaucoma     Hiatal hernia     Hypertension     Imbalance     Leg muscle spasm     Leukocytosis 2024    MS (multiple sclerosis) (HCC)     Multiple sclerosis (HCC)     Nephrolithiasis     Neurogenic bladder     No natural teeth     Sinus pain     Spinal stenosis     Strain of thoracic region     Stroke ([facility])     Suprapubic catheter ([facility])       Past Surgical History:   Procedure Laterality Date    APPENDECTOMY      BRAIN SURGERY      Coil placed in aneurysm    CEREBRAL ANEURYSM REPAIR      CYSTOSCOPY      CYSTOSCOPY      CYSTOSCOPY  2018    CYSTOSCOPY  01/15/2021    EYE SURGERY      transscleral cyclophotocoagulation noncontact YAG laser    IR SUPRAPUBIC CATHETER CHECK/CHANGE/REINSERTION/UPSIZE  3/28/2024    MYRINGOTOMY      with ventilation tube insertion    SC LITHOLAPAXY SMPL/SM <2.5 CM N/A 2019    Procedure: CYSTOSCOPY, holmium laser litholapaxy of bladder stones, EXCHANGE OF SP TUBE;  Surgeon: Javy Jeffries MD;  Location: [facility];  Service: Urology    SUPRAPUBIC CATHETER INSERTION        Family History   Problem Relation Age of Onset    Heart attack Mother     Stroke Mother     Heart attack Father     Anuerysm Father         In Stomach     Aneurysm Father       Social History     Tobacco Use    Smoking status: Former     Current packs/day: 0.00     Average packs/day: 0.5 packs/day for 31.0 years (15.5 ttl pk-yrs)     Types: Cigarettes     Start date:      Quit date:      Years since quittin.0    Smokeless tobacco: Never   Vaping Use    Vaping status: Never Used   Substance  Use Topics    Alcohol use: Not Currently    Drug use: No      E-Cigarette/Vaping    E-Cigarette Use Never User       E-Cigarette/Vaping Substances    Nicotine No     THC No     CBD No     Flavoring No     Other No     Unknown No       I have reviewed and agree with the history as documented.     Patient presents with:  Altered Mental Status: Pt arrives via ems, from home. Per ems pt has been more confused, lethargic, complaining of HA, leg pain, fever of 100.1. Pt unable to answer orientation questions.     74y biba from home for evaluation of multiple complaints.  Pt complains of headache, leg pain, generalized malaise.  Has a mild cough. Denies v/s.  Per EMS, family reported pt confused, very weak/fatigued.    Pt admitted from 12/14-12/18 for stercoral colitis, asymptomatic bacturia - was briefly on abx, but discontinued w/ negative urine culture.  Pt's bowel regimen adjusted and feeling better.  Per records, pt w/ SPT change 12/10 in Urology office          History provided by:  Patient and medical records   used: No    Altered Mental Status      Review of Systems   All other systems reviewed and are negative.          Objective       ED Triage Vitals   Temperature Pulse Blood Pressure Respirations SpO2 Patient Position - Orthostatic VS   12/25/24 1057 12/25/24 1057 12/25/24 1057 12/25/24 1057 12/25/24 1057 12/25/24 1057   100.1 °F (37.8 °C) 100 157/75 18 96 % Lying      Temp Source Heart Rate Source BP Location FiO2 (%) Pain Score    12/25/24 1057 12/25/24 1057 12/25/24 1057 -- 12/25/24 1155    Oral Monitor Right arm  Med Not Given for Pain - for MAR use only      Vitals      Date and Time Temp Pulse SpO2 Resp BP Pain Score FACES Pain Rating User   12/25/24 1540 98.1 °F (36.7 °C) 90 91 % 18 106/48 -- -- DII   12/25/24 1445 -- 92 94 % 18 108/46 -- -- CF   12/25/24 1430 -- 92 93 % 18 97/49 -- -- CF   12/25/24 1415 -- 88 94 % 18 131/80 -- -- CF   12/25/24 1400 -- 88 97 % 18 121/51 -- -- CF    12/25/24 1346 -- -- -- -- -- Med Not Given for Pain - for MAR use only -- CF   12/25/24 1345 -- 92 97 % 18 140/55 -- -- CF   12/25/24 1330 -- 90 96 % 18 124/42 -- -- CF   12/25/24 1315 -- 92 95 % 18 118/52 -- -- CF   12/25/24 1300 102 °F (38.9 °C) 94 96 % 18 135/80 -- -- CF   12/25/24 1245 -- 96 97 % 18 137/58 -- -- CF   12/25/24 1215 -- 104 94 % 18 139/50 -- -- CF   12/25/24 1158 -- 100 96 % 18 111/44 -- -- CF   12/25/24 1155 -- -- -- -- -- Med Not Given for Pain - for MAR use only -- CF   12/25/24 1123 102.2 °F (39 °C) -- -- -- -- -- -- VLL   12/25/24 1057 100.1 °F (37.8 °C) 100 96 % 18 157/75 -- -- CF            Physical Exam  Vitals and nursing note reviewed.   Constitutional:       General: He is not in acute distress.     Appearance: Normal appearance. He is ill-appearing. He is not diaphoretic.   HENT:      Nose: Nose normal.      Mouth/Throat:      Mouth: Mucous membranes are dry.   Eyes:      Conjunctiva/sclera: Conjunctivae normal.   Cardiovascular:      Rate and Rhythm: Regular rhythm. Tachycardia present.   Pulmonary:      Breath sounds: Decreased air movement (poor effort. no focal r/r/w appreciated) present.   Abdominal:      Palpations: Abdomen is soft.   Musculoskeletal:      Cervical back: Normal range of motion.   Skin:     General: Skin is warm.      Coloration: Skin is pale.   Neurological:      Mental Status: He is alert. Mental status is at baseline.         Results Reviewed       Procedure Component Value Units Date/Time    Lactic acid 2 Hours [[id number]]  (Normal) Collected: 12/25/24 1452    Lab Status: Final result Specimen: Blood from Arm, Left Updated: 12/25/24 1515     LACTIC ACID 1.4 mmol/L     Narrative:      Result may be elevated if tourniquet was used during collection.    Platelet count [[id number]]  (Normal) Collected: 12/25/24 1453    Lab Status: Final result Specimen: Blood from Arm, Right Updated: 12/25/24 1457     Platelets 332 Thousands/uL      MPV 9.0 fL     Urine  Microscopic [[id number]]  (Abnormal) Collected: 12/25/24 1352    Lab Status: Final result Specimen: Urine, Suprapubic catheter Updated: 12/25/24 1409     RBC, UA Innumerable /hpf      WBC, UA Innumerable /hpf      Epithelial Cells None Seen /hpf      Bacteria, UA Moderate /hpf      MUCUS THREADS Occasional     WBC Clumps Present     Budding Yeast Present    Urine culture [[id number]] Collected: 12/25/24 1352    Lab Status: In process Specimen: Urine, Suprapubic catheter Updated: 12/25/24 1409    Urine Macroscopic, POC [[id number]]  (Abnormal) Collected: 12/25/24 1352    Lab Status: Final result Specimen: Urine Updated: 12/25/24 1353     Color, UA Yellow     Clarity, UA Cloudy     pH, UA 5.5     Leukocytes, UA Small     Nitrite, UA Positive     Protein, UA 30 (1+) mg/dl      Glucose,  (1/2%) mg/dl      Ketones, UA Trace mg/dl      Urobilinogen, UA 0.2 E.U./dl      Bilirubin, UA Negative     Occult Blood, UA Moderate     Specific Gravity, UA 1.025    Narrative:      CLINITEK RESULT    FLU/COVID Rapid Antigen (30 min. TAT) - Preferred screening test in ED [[id number]]  (Normal) Collected: 12/25/24 1154    Lab Status: Final result Specimen: Nares from Nose Updated: 12/25/24 1223     SARS COV Rapid Antigen Negative     Influenza A Rapid Antigen Negative     Influenza B Rapid Antigen Negative    Narrative:      This test has been performed using the Quidel Anai 2 FLU+SARS Antigen test under the Emergency Use Authorization (EUA). This test has been validated by the  and verified by the performing laboratory. The Anai uses lateral flow immunofluorescent sandwich assay to detect SARS-COV, Influenza A and Influenza B Antigen.     The Quidel Anai 2 SARS Antigen test does not differentiate between SARS-CoV and SARS-CoV-2.     Negative results are presumptive and may be confirmed with a molecular assay, if necessary, for patient management. Negative results do not rule out SARS-CoV-2 or influenza infection and  should not be used as the sole basis for treatment or patient management decisions. A negative test result may occur if the level of antigen in a sample is below the limit of detection of this test.     Positive results are indicative of the presence of viral antigens, but do not rule out bacterial infection or co-infection with other viruses.     All test results should be used as an adjunct to clinical observations and other information available to the provider.    FOR PEDIATRIC PATIENTS - copy/paste COVID Guidelines URL to browser: [email or web address]    Procalcitonin [[id number]]  (Normal) Collected: 12/25/24 1133    Lab Status: Final result Specimen: Blood from Arm, Right Updated: 12/25/24 1223     Procalcitonin 0.10 ng/ml     Protime-INR [[id number]]  (Normal) Collected: 12/25/24 1133    Lab Status: Final result Specimen: Blood from Arm, Right Updated: 12/25/24 1222     Protime 12.8 seconds      INR 0.94    Narrative:      INR Therapeutic Range    Indication                                             INR Range      Atrial Fibrillation                                               2.0-3.0  Hypercoagulable State                                    2.0.2.3  Left Ventricular Asist Device                            2.0-3.0  Mechanical Heart Valve                                  -    Aortic(with afib, MI, embolism, HF, LA enlargement,    and/or coagulopathy)                                     2.0-3.0 (2.5-3.5)     Mitral                                                             2.5-3.5  Prosthetic/Bioprosthetic Heart Valve               2.0-3.0  Venous thromboembolism (VTE: VT, PE        2.0-3.0    APTT [[id number]]  (Abnormal) Collected: 12/25/24 1133    Lab Status: Final result Specimen: Blood from Arm, Right Updated: 12/25/24 1222     PTT 22 seconds     Lactic acid [[id number]]  (Abnormal) Collected: 12/25/24 1133    Lab Status: Final result Specimen: Blood  from Arm, Right Updated: 12/25/24 1218     LACTIC ACID 2.9 mmol/L     Narrative:      Result may be elevated if tourniquet was used during collection.    Comprehensive metabolic panel [[id number]]  (Abnormal) Collected: 12/25/24 1133    Lab Status: Final result Specimen: Blood from Arm, Right Updated: 12/25/24 1216     Sodium 135 mmol/L      Potassium 3.9 mmol/L      Chloride 99 mmol/L      CO2 25 mmol/L      ANION GAP 11 mmol/L      BUN 9 mg/dL      Creatinine 0.95 mg/dL      Glucose 106 mg/dL      Calcium 9.9 mg/dL      AST 13 U/L      ALT 10 U/L      Alkaline Phosphatase 94 U/L      Total Protein 8.5 g/dL      Albumin 4.0 g/dL      Total Bilirubin 0.49 mg/dL      eGFR 77 ml/min/1.73sq m     Narrative:      National Kidney Disease Foundation guidelines for Chronic Kidney Disease (CKD):     Stage 1 with normal or high GFR (GFR > 90 mL/min/1.73 square meters)    Stage 2 Mild CKD (GFR = 60-89 mL/min/1.73 square meters)    Stage 3A Moderate CKD (GFR = 45-59 mL/min/1.73 square meters)    Stage 3B Moderate CKD (GFR = 30-44 mL/min/1.73 square meters)    Stage 4 Severe CKD (GFR = 15-29 mL/min/1.73 square meters)    Stage 5 End Stage CKD (GFR <15 mL/min/1.73 square meters)  Note: GFR calculation is accurate only with a steady state creatinine    Blood culture #1 [[id number]] Collected: 12/25/24 1142    Lab Status: In process Specimen: Blood from Hand, Right Updated: 12/25/24 1214    Blood culture #2 [[id number]] Collected: 12/25/24 1133    Lab Status: In process Specimen: Blood from Arm, Right Updated: 12/25/24 1214    CBC and differential [[id number]]  (Abnormal) Collected: 12/25/24 1133    Lab Status: Final result Specimen: Blood from Arm, Right Updated: 12/25/24 1154     WBC 10.09 Thousand/uL      RBC 5.42 Million/uL      Hemoglobin 14.8 g/dL      Hematocrit 46.6 %      MCV 86 fL      MCH 27.3 pg      MCHC 31.8 g/dL      RDW 15.4 %      MPV 8.6 fL      Platelets 309 Thousands/uL      nRBC 0 /100 WBCs      Segmented % 70 %       Immature Grans % 0 %      Lymphocytes % 14 %      Monocytes % 11 %      Eosinophils Relative 4 %      Basophils Relative 1 %      Absolute Neutrophils 7.04 Thousands/µL      Absolute Immature Grans 0.04 Thousand/uL      Absolute Lymphocytes 1.41 Thousands/µL      Absolute Monocytes 1.14 Thousand/µL      Eosinophils Absolute 0.39 Thousand/µL      Basophils Absolute 0.07 Thousands/µL             XR chest 1 view portable   ED Interpretation by Payton Mcelroy DO (12/25 1203)   .vlx      Final Interpretation by Kasandra Downs MD (12/25 1156)      No acute cardiopulmonary disease.            Workstation performed: [id number]         CT abdomen pelvis w contrast    (Results Pending)       CriticalCare Time    Date/Time: 12/25/2024 1:50 PM    Performed by: Payton Mcelroy DO  Authorized by: Payton Mcelroy DO    Critical care provider statement:     Critical care time (minutes):  35    Critical care time was exclusive of:  Separately billable procedures and treating other patients and teaching time    Critical care was necessary to treat or prevent imminent or life-threatening deterioration of the following conditions:  Sepsis    Critical care was time spent personally by me on the following activities:  Blood draw for specimens, obtaining history from patient or surrogate, development of treatment plan with patient or surrogate, evaluation of patient's response to treatment, examination of patient, ordering and performing treatments and interventions, ordering and review of laboratory studies, ordering and review of radiographic studies, re-evaluation of patient's condition and review of old charts    I assumed direction of critical care for this patient from another provider in my specialty: no    Suprapubic Tube    Date/Time: 12/25/2024 1:00 PM    Performed by: Payton Mcelroy DO  Authorized by: Payton Mcelroy DO    Patient location:  Bedside  Consent:     Consent obtained:  Verbal    Consent given  by:  Patient    Alternatives discussed:  No treatment  Universal protocol:     Patient identity confirmed:  Verbally with patient  Anesthesia (see MAR for exact dosages):     Anesthesia method:  Topical application  Procedure details:     Complexity:  Simple    Catheter type:  Cage    Catheter size:  20 Fr    Number of attempts:  1    Urine characteristics:  Mildly cloudy  Post-procedure details:     Patient tolerance of procedure:  Tolerated well, no immediate complications  Comments:      SPT exchange at bedside.    ECG 12 Lead Documentation Only    Date/Time: 12/25/2024 11:20 AM    Performed by: Payton Mcelroy DO  Authorized by: Payton Mcelroy DO    Indications / Diagnosis:  Sepsis eval  ECG reviewed by me, the ED Provider: yes    Patient location:  ED  Previous ECG:     Previous ECG:  Compared to current    Similarity:  Changes noted  Interpretation:     Interpretation: non-specific    Quality:     Tracing quality:  Limited by artifact  Rate:     ECG rate:  98    ECG rate assessment: normal    Rhythm:     Rhythm: sinus rhythm        ED Medication and Procedure Management   Prior to Admission Medications   Prescriptions Last Dose Informant Patient Reported? Taking?   Empagliflozin (JARDIANCE) 10 MG TABS tablet 12/25/2024 Outside Facility (Specify) Yes Yes   Sig: Take 10 mg by mouth every morning   Ergocalciferol (VITAMIN D2 PO)  Outside Facility (Specify) Yes Yes   Sig: Take 50,000 Units by mouth once a week   acetaminophen (TYLENOL) 325 mg tablet  Outside Facility (Specify) No Yes   Sig: Take 2 tablets (650 mg total) by mouth every 6 (six) hours as needed for mild pain   albuterol (2.5 mg/3 mL) 0.083 % nebulizer solution  Outside Facility (Specify) No Yes   Sig: Take 3 mL (2.5 mg total) by nebulization every 6 (six) hours as needed for wheezing or shortness of breath   amLODIPine-atorvastatin (CADUET) 10-80 MG per tablet 12/25/2024 Outside Facility (Specify) Yes Yes   Sig: Take 1 tablet by mouth daily    baclofen 10 mg tablet 2024 Outside Facility (Specify) Yes Yes   Sig: Take 5 mg by mouth 3 (three) times a day   bisacodyl (DULCOLAX) 10 mg suppository  Outside Facility (Specify) No Yes   Sig: Insert 1 suppository (10 mg total) into the rectum daily as needed for constipation for up to 12 doses   budesonide-formoterol (SYMBICORT) 160-4.5 mcg/act inhaler 2024 Outside Facility (Specify) Yes Yes   Sig: Inhale 2 puffs 2 (two) times a day Rinse mouth after use.   clopidogrel (PLAVIX) 75 mg tablet 2024 Outside Facility (Specify) No Yes   Sig: Take 1 tablet (75 mg total) by mouth daily   cromolyn (NASALCHROM) 5.2 MG/ACT nasal spray 2024 Outside Facility (Specify) No Yes   Si spray into each nostril 3 (three) times a day   cyanocobalamin (VITAMIN B-12) 500 MCG tablet 2024 Outside Facility (Specify) No Yes   Sig: Take 1 tablet (500 mcg total) by mouth daily   gabapentin (NEURONTIN) 300 mg capsule 2024  No Yes   Sig: Take 1 capsule (300 mg total) by mouth 3 (three) times a day   gabapentin (NEURONTIN) 300 mg capsule 2024  No Yes   Sig: Take 2 capsules (600 mg total) by mouth daily at bedtime   latanoprost (XALATAN) 0.005 % ophthalmic solution 2024 Outside Facility (Specify) Yes Yes   Sig: Administer 1 drop to both eyes daily at bedtime   lidocaine (LIDODERM) 5 %  Outside Facility (Specify) No Yes   Sig: Apply 1 patch topically over 12 hours daily Remove & Discard patch within 12 hours or as directed by MD   meclizine (ANTIVERT) 12.5 MG tablet  Outside Facility (Specify) No Yes   Sig: Take 1 tablet (12.5 mg total) by mouth every 8 (eight) hours as needed for dizziness   melatonin 3 mg 2024 Outside Facility (Specify) Yes Yes   Sig: Take 3 mg by mouth daily at bedtime as needed   metFORMIN (GLUCOPHAGE) 1000 MG tablet 2024 Outside Facility (Specify) Yes Yes   Sig: Take 1,000 mg by mouth 2 (two) times a day with meals   methenamine hippurate (HIPREX) 1 g tablet  12/25/2024 Outside Facility (Specify) No Yes   Sig: Take 1 tablet (1 g total) by mouth 2 (two) times a day with meals   mirtazapine (REMERON) 7.5 MG tablet 12/24/2024 Outside Facility (Specify) Yes Yes   Sig: Take 7.5 mg by mouth daily at bedtime   pantoprazole (PROTONIX) 40 mg tablet 12/25/2024 Outside Facility (Specify) Yes Yes   Sig: Take 40 mg by mouth daily   polyethylene glycol (MIRALAX) 17 g packet 12/25/2024 Outside Facility (Specify) No Yes   Sig: Take 17 g by mouth 2 (two) times a day   propranolol (INDERAL LA) 60 mg 24 hr capsule 12/25/2024 Outside Facility (Specify) Yes Yes   Sig: Take 60 mg by mouth daily   senna-docusate sodium (SENOKOT S) 8.6-50 mg per tablet 12/24/2024 Outside Facility (Specify) No Yes   Sig: Take 2 tablets by mouth daily at bedtime      Facility-Administered Medications: None     Current Discharge Medication List        CONTINUE these medications which have NOT CHANGED    Details   acetaminophen (TYLENOL) 325 mg tablet Take 2 tablets (650 mg total) by mouth every 6 (six) hours as needed for mild pain    Associated Diagnoses: Chronic suprapubic catheter (HCC)      albuterol (2.5 mg/3 mL) 0.083 % nebulizer solution Take 3 mL (2.5 mg total) by nebulization every 6 (six) hours as needed for wheezing or shortness of breath  Qty: 60 mL, Refills: 3    Associated Diagnoses: Shortness of breath      amLODIPine-atorvastatin (CADUET) 10-80 MG per tablet Take 1 tablet by mouth daily      baclofen 10 mg tablet Take 5 mg by mouth 3 (three) times a day      bisacodyl (DULCOLAX) 10 mg suppository Insert 1 suppository (10 mg total) into the rectum daily as needed for constipation for up to 12 doses  Qty: 12 suppository, Refills: 0    Associated Diagnoses: Constipation, unspecified constipation type      budesonide-formoterol (SYMBICORT) 160-4.5 mcg/act inhaler Inhale 2 puffs 2 (two) times a day Rinse mouth after use.      clopidogrel (PLAVIX) 75 mg tablet Take 1 tablet (75 mg total) by mouth  daily  Refills: 0    Associated Diagnoses: Chronic suprapubic catheter (HCC); Neurogenic bladder; Cellulitis      cromolyn (NASALCHROM) 5.2 MG/ACT nasal spray 1 spray into each nostril 3 (three) times a day  Qty: 13 mL, Refills: 0    Associated Diagnoses: Cough; Symptoms of upper respiratory infection (URI)      cyanocobalamin (VITAMIN B-12) 500 MCG tablet Take 1 tablet (500 mcg total) by mouth daily    Associated Diagnoses: Vitamin B deficiency      Empagliflozin (JARDIANCE) 10 MG TABS tablet Take 10 mg by mouth every morning      Ergocalciferol (VITAMIN D2 PO) Take 50,000 Units by mouth once a week      !! gabapentin (NEURONTIN) 300 mg capsule Take 1 capsule (300 mg total) by mouth 3 (three) times a day  Qty: 30 capsule, Refills: 0    Associated Diagnoses: Multiple sclerosis (HCC)      !! gabapentin (NEURONTIN) 300 mg capsule Take 2 capsules (600 mg total) by mouth daily at bedtime  Qty: 30 capsule, Refills: 0    Associated Diagnoses: Multiple sclerosis (HCC)      latanoprost (XALATAN) 0.005 % ophthalmic solution Administer 1 drop to both eyes daily at bedtime      lidocaine (LIDODERM) 5 % Apply 1 patch topically over 12 hours daily Remove & Discard patch within 12 hours or as directed by MD    Associated Diagnoses: Fall, initial encounter      meclizine (ANTIVERT) 12.5 MG tablet Take 1 tablet (12.5 mg total) by mouth every 8 (eight) hours as needed for dizziness  Qty: 20 tablet, Refills: 0    Associated Diagnoses: Dizziness      melatonin 3 mg Take 3 mg by mouth daily at bedtime as needed      metFORMIN (GLUCOPHAGE) 1000 MG tablet Take 1,000 mg by mouth 2 (two) times a day with meals      methenamine hippurate (HIPREX) 1 g tablet Take 1 tablet (1 g total) by mouth 2 (two) times a day with meals  Qty: 180 tablet, Refills: 3    Associated Diagnoses: Chronic suprapubic catheter (HCC); Cystitis      mirtazapine (REMERON) 7.5 MG tablet Take 7.5 mg by mouth daily at bedtime      pantoprazole (PROTONIX) 40 mg tablet  Take 40 mg by mouth daily      polyethylene glycol (MIRALAX) 17 g packet Take 17 g by mouth 2 (two) times a day  Qty: 60 each, Refills: 0    Associated Diagnoses: Stercoral colitis      propranolol (INDERAL LA) 60 mg 24 hr capsule Take 60 mg by mouth daily      senna-docusate sodium (SENOKOT S) 8.6-50 mg per tablet Take 2 tablets by mouth daily at bedtime  Qty: 30 tablet, Refills: 0    Associated Diagnoses: Constipation, unspecified constipation type       !! - Potential duplicate medications found. Please discuss with provider.        No discharge procedures on file.  ED SEPSIS DOCUMENTATION   Time reflects when diagnosis was documented in both MDM as applicable and the Disposition within this note       Time User Action Codes Description Comment    12/25/2024  1:58 PM Payton Mcelroy Add [A41.9,  R65.20] Severe sepsis (HCC)     12/25/2024  1:58 PM Payton Mcelroy [N39.0] UTI (urinary tract infection)     12/25/2024  2:48 PM Annemarie Mello Add [Z86.73] History of stroke                  Payton Mcelroy,   12/25/24 9370

## 2024-12-26 LAB
ANION GAP SERPL CALCULATED.3IONS-SCNC: 8 MMOL/L (ref 4–13)
BASOPHILS # BLD AUTO: 0.06 THOUSANDS/ÂΜL (ref 0–0.1)
BASOPHILS NFR BLD AUTO: 1 % (ref 0–1)
BUN SERPL-MCNC: 12 MG/DL (ref 5–25)
CALCIUM SERPL-MCNC: 8.6 MG/DL (ref 8.4–10.2)
CHLORIDE SERPL-SCNC: 105 MMOL/L (ref 96–108)
CO2 SERPL-SCNC: 20 MMOL/L (ref 21–32)
CREAT SERPL-MCNC: 0.76 MG/DL (ref 0.6–1.3)
EOSINOPHIL # BLD AUTO: 0.29 THOUSAND/ÂΜL (ref 0–0.61)
EOSINOPHIL NFR BLD AUTO: 3 % (ref 0–6)
ERYTHROCYTE [DISTWIDTH] IN BLOOD BY AUTOMATED COUNT: 15.2 % (ref 11.6–15.1)
GFR SERPL CREATININE-BSD FRML MDRD: 89 ML/MIN/1.73SQ M
GLUCOSE SERPL-MCNC: 108 MG/DL (ref 65–140)
GLUCOSE SERPL-MCNC: 110 MG/DL (ref 65–140)
GLUCOSE SERPL-MCNC: 115 MG/DL (ref 65–140)
GLUCOSE SERPL-MCNC: 115 MG/DL (ref 65–140)
GLUCOSE SERPL-MCNC: 127 MG/DL (ref 65–140)
GLUCOSE SERPL-MCNC: 144 MG/DL (ref 65–140)
HCT VFR BLD AUTO: 38.5 % (ref 36.5–49.3)
HGB BLD-MCNC: 12.5 G/DL (ref 12–17)
IMM GRANULOCYTES # BLD AUTO: 0.02 THOUSAND/UL (ref 0–0.2)
IMM GRANULOCYTES NFR BLD AUTO: 0 % (ref 0–2)
LYMPHOCYTES # BLD AUTO: 0.91 THOUSANDS/ÂΜL (ref 0.6–4.47)
LYMPHOCYTES NFR BLD AUTO: 10 % (ref 14–44)
MCH RBC QN AUTO: 27.2 PG (ref 26.8–34.3)
MCHC RBC AUTO-ENTMCNC: 32.5 G/DL (ref 31.4–37.4)
MCV RBC AUTO: 84 FL (ref 82–98)
MONOCYTES # BLD AUTO: 0.98 THOUSAND/ÂΜL (ref 0.17–1.22)
MONOCYTES NFR BLD AUTO: 11 % (ref 4–12)
NEUTROPHILS # BLD AUTO: 6.45 THOUSANDS/ÂΜL (ref 1.85–7.62)
NEUTS SEG NFR BLD AUTO: 75 % (ref 43–75)
NRBC BLD AUTO-RTO: 0 /100 WBCS
PLATELET # BLD AUTO: 257 THOUSANDS/UL (ref 149–390)
PMV BLD AUTO: 8.8 FL (ref 8.9–12.7)
POTASSIUM SERPL-SCNC: 3.5 MMOL/L (ref 3.5–5.3)
PROCALCITONIN SERPL-MCNC: 0.1 NG/ML
RBC # BLD AUTO: 4.59 MILLION/UL (ref 3.88–5.62)
SODIUM SERPL-SCNC: 133 MMOL/L (ref 135–147)
WBC # BLD AUTO: 8.71 THOUSAND/UL (ref 4.31–10.16)

## 2024-12-26 PROCEDURE — 82948 REAGENT STRIP/BLOOD GLUCOSE: CPT

## 2024-12-26 PROCEDURE — 99223 1ST HOSP IP/OBS HIGH 75: CPT

## 2024-12-26 PROCEDURE — 80048 BASIC METABOLIC PNL TOTAL CA: CPT

## 2024-12-26 PROCEDURE — 84145 PROCALCITONIN (PCT): CPT

## 2024-12-26 PROCEDURE — 99232 SBSQ HOSP IP/OBS MODERATE 35: CPT | Performed by: STUDENT IN AN ORGANIZED HEALTH CARE EDUCATION/TRAINING PROGRAM

## 2024-12-26 PROCEDURE — 85025 COMPLETE CBC W/AUTO DIFF WBC: CPT

## 2024-12-26 RX ORDER — ACETAMINOPHEN 325 MG/1
650 TABLET ORAL EVERY 6 HOURS PRN
Status: DISCONTINUED | OUTPATIENT
Start: 2024-12-26 | End: 2024-12-31 | Stop reason: HOSPADM

## 2024-12-26 RX ORDER — IBUPROFEN 400 MG/1
400 TABLET, FILM COATED ORAL ONCE
Status: COMPLETED | OUTPATIENT
Start: 2024-12-26 | End: 2024-12-26

## 2024-12-26 RX ADMIN — SENNOSIDES AND DOCUSATE SODIUM 2 TABLET: 8.6; 5 TABLET ORAL at 21:25

## 2024-12-26 RX ADMIN — POLYETHYLENE GLYCOL 3350 17 G: 17 POWDER, FOR SOLUTION ORAL at 08:12

## 2024-12-26 RX ADMIN — METHENAMINE HIPPURATE 1 G: 1000 TABLET ORAL at 08:12

## 2024-12-26 RX ADMIN — IBUPROFEN 400 MG: 400 TABLET, FILM COATED ORAL at 04:08

## 2024-12-26 RX ADMIN — METHENAMINE HIPPURATE 1 G: 1000 TABLET ORAL at 17:55

## 2024-12-26 RX ADMIN — PROPRANOLOL HYDROCHLORIDE 60 MG: 60 CAPSULE, EXTENDED RELEASE ORAL at 08:12

## 2024-12-26 RX ADMIN — SODIUM CHLORIDE 100 ML/HR: 0.9 INJECTION, SOLUTION INTRAVENOUS at 01:35

## 2024-12-26 RX ADMIN — AMLODIPINE BESYLATE 10 MG: 10 TABLET ORAL at 08:12

## 2024-12-26 RX ADMIN — BUDESONIDE AND FORMOTEROL FUMARATE DIHYDRATE 2 PUFF: 160; 4.5 AEROSOL RESPIRATORY (INHALATION) at 17:57

## 2024-12-26 RX ADMIN — ATORVASTATIN CALCIUM 80 MG: 80 TABLET, FILM COATED ORAL at 17:54

## 2024-12-26 RX ADMIN — GABAPENTIN 300 MG: 300 CAPSULE ORAL at 08:12

## 2024-12-26 RX ADMIN — GABAPENTIN 300 MG: 300 CAPSULE ORAL at 21:25

## 2024-12-26 RX ADMIN — CEFEPIME 2000 MG: 2 INJECTION, POWDER, FOR SOLUTION INTRAVENOUS at 02:41

## 2024-12-26 RX ADMIN — CEFEPIME 2000 MG: 2 INJECTION, POWDER, FOR SOLUTION INTRAVENOUS at 14:37

## 2024-12-26 RX ADMIN — HEPARIN SODIUM 5000 UNITS: 5000 INJECTION INTRAVENOUS; SUBCUTANEOUS at 14:39

## 2024-12-26 RX ADMIN — PANTOPRAZOLE SODIUM 40 MG: 40 TABLET, DELAYED RELEASE ORAL at 05:07

## 2024-12-26 RX ADMIN — BACLOFEN 5 MG: 10 TABLET ORAL at 21:25

## 2024-12-26 RX ADMIN — LATANOPROST 1 DROP: 50 SOLUTION OPHTHALMIC at 21:31

## 2024-12-26 RX ADMIN — BACLOFEN 5 MG: 10 TABLET ORAL at 08:12

## 2024-12-26 RX ADMIN — ACETAMINOPHEN 650 MG: 325 TABLET, FILM COATED ORAL at 21:25

## 2024-12-26 RX ADMIN — GABAPENTIN 300 MG: 300 CAPSULE ORAL at 17:54

## 2024-12-26 RX ADMIN — ACETAMINOPHEN 650 MG: 325 TABLET, FILM COATED ORAL at 14:45

## 2024-12-26 RX ADMIN — MIRTAZAPINE 7.5 MG: 7.5 TABLET, FILM COATED ORAL at 21:25

## 2024-12-26 RX ADMIN — ACETAMINOPHEN 650 MG: 325 TABLET, FILM COATED ORAL at 02:49

## 2024-12-26 RX ADMIN — HEPARIN SODIUM 5000 UNITS: 5000 INJECTION INTRAVENOUS; SUBCUTANEOUS at 05:07

## 2024-12-26 RX ADMIN — CLOPIDOGREL BISULFATE 75 MG: 75 TABLET ORAL at 08:12

## 2024-12-26 RX ADMIN — BUDESONIDE AND FORMOTEROL FUMARATE DIHYDRATE 2 PUFF: 160; 4.5 AEROSOL RESPIRATORY (INHALATION) at 08:17

## 2024-12-26 RX ADMIN — Medication 500 MCG: at 08:12

## 2024-12-26 RX ADMIN — HEPARIN SODIUM 5000 UNITS: 5000 INJECTION INTRAVENOUS; SUBCUTANEOUS at 21:25

## 2024-12-26 RX ADMIN — BACLOFEN 5 MG: 10 TABLET ORAL at 17:54

## 2024-12-26 NOTE — ASSESSMENT & PLAN NOTE
Lab Results   Component Value Date    HGBA1C 9.0 (H) 10/06/2024       Recent Labs     12/25/24  1718 12/25/24  2119 12/26/24  0748 12/26/24  1136   POCGLU 102 113 110 115       Blood Sugar Average: Last 72 hrs:  (P) 110  Patient on metformin and Jardiance currently on hold  Monitor on diabetic diet, sliding scale and Accu-Cheks

## 2024-12-26 NOTE — ASSESSMENT & PLAN NOTE
Chronic SPT in place 2/2 MS   Exchanged at presentation to ED 12/25   Remains in place draining clear yellow urine     Plan:  Maintain SPT   Return to office for scheduled q4week exchanges

## 2024-12-26 NOTE — PLAN OF CARE
Problem: PAIN - ADULT  Goal: Verbalizes/displays adequate comfort level or baseline comfort level  Description: Interventions:  - Encourage patient to monitor pain and request assistance  - Assess pain using appropriate pain scale  - Administer analgesics based on type and severity of pain and evaluate response  - Implement non-pharmacological measures as appropriate and evaluate response  - Consider cultural and social influences on pain and pain management  - Notify physician/advanced practitioner if interventions unsuccessful or patient reports new pain  Outcome: Progressing     Problem: INFECTION - ADULT  Goal: Absence or prevention of progression during hospitalization  Description: INTERVENTIONS:  - Assess and monitor for signs and symptoms of infection  - Monitor lab/diagnostic results  - Monitor all insertion sites, i.e. indwelling lines, tubes, and drains  - Monitor endotracheal if appropriate and nasal secretions for changes in amount and color  - [place] appropriate cooling/warming therapies per order  - Administer medications as ordered  - Instruct and encourage patient and family to use good hand hygiene technique  - Identify and instruct in appropriate isolation precautions for identified infection/condition  Outcome: Progressing  Goal: Absence of fever/infection during neutropenic period  Description: INTERVENTIONS:  - Monitor WBC    Outcome: Progressing     Problem: SAFETY ADULT  Goal: Patient will remain free of falls  Description: INTERVENTIONS:  - Educate patient/family on patient safety including physical limitations  - Instruct patient to call for assistance with activity   - Consult OT/PT to assist with strengthening/mobility   - Keep Call bell within reach  - Keep bed low and locked with side rails adjusted as appropriate  - Keep care items and personal belongings within reach  - Initiate and maintain comfort rounds  - Make Fall Risk Sign visible to staff  - Offer Toileting every 2 Hours,  in advance of need  - Initiate/Maintain bedalarm  - Obtain necessary fall risk management equipment:   - Apply yellow socks and bracelet for high fall risk patients  - Consider moving patient to room near nurses station  Outcome: Progressing  Goal: Maintain or return to baseline ADL function  Description: INTERVENTIONS:  -  Assess patient's ability to carry out ADLs; assess patient's baseline for ADL function and identify physical deficits which impact ability to perform ADLs (bathing, care of mouth/teeth, toileting, grooming, dressing, etc.)  - Assess/evaluate cause of self-care deficits   - Assess range of motion  - Assess patient's mobility; develop plan if impaired  - Assess patient's need for assistive devices and provide as appropriate  - Encourage maximum independence but intervene and supervise when necessary  - Involve family in performance of ADLs  - Assess for home care needs following discharge   - Consider OT consult to assist with ADL evaluation and planning for discharge  - Provide patient education as appropriate  Outcome: Progressing  Goal: Maintains/Returns to pre admission functional level  Description: INTERVENTIONS:  - Perform AM-PAC 6 Click Basic Mobility/ Daily Activity assessment daily.  - Set and communicate daily mobility goal to care team and patient/family/caregiver.   - Collaborate with rehabilitation services on mobility goals if consulted  - Out of bed for toileting  - Record patient progress and toleration of activity level   Outcome: Progressing     Problem: DISCHARGE PLANNING  Goal: Discharge to home or other facility with appropriate resources  Description: INTERVENTIONS:  - Identify barriers to discharge w/patient and caregiver  - Arrange for needed discharge resources and transportation as appropriate  - Identify discharge learning needs (meds, wound care, etc.)  - Arrange for interpretive services to assist at discharge as needed  - Refer to Case Management Department for  coordinating discharge planning if the patient needs post-hospital services based on physician/advanced practitioner order or complex needs related to functional status, cognitive ability, or social support system  Outcome: Progressing     Problem: Knowledge Deficit  Goal: Patient/family/caregiver demonstrates understanding of disease process, treatment plan, medications, and discharge instructions  Description: Complete learning assessment and assess knowledge base.  Interventions:  - Provide teaching at level of understanding  - Provide teaching via preferred learning methods  Outcome: Progressing

## 2024-12-26 NOTE — ASSESSMENT & PLAN NOTE
Patient has chronic urinary retention status post suprapubic catheter.  Continue follow-up with neurology as previously recommended

## 2024-12-26 NOTE — ASSESSMENT & PLAN NOTE
Presented to the ED 12/25 with complaints of fatigue, headache and fevers  Sepsis criteria -- febrile, tachycardic, and elevated lactic acid  SPT exchanged in ED; urine sample was obtained which showed positive blood/nitrite/leukocyte  CT a/p -- diffuse urinary bladder wall thickening consistent with cystitis. Cage catheter terminates within the urinary bladder which is predominantly air-filled (likely due to recent spt change)   Initiated on cefepime in ED  Low grade fevers continued overnight  Creatinine and WBC stable      Plan:  Continue with IV antibiotics cefepime; await repeat UC, tailor accordingly   Pain has improved   Maintian SPT

## 2024-12-26 NOTE — PHYSICAL THERAPY NOTE
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)    Stercoral colitis    Urinary tract infection associated with cystostomy catheter  (HCC)    Type 2 diabetes mellitus without complication, without long-term current use of insulin (HCC)    Multiple sclerosis (HCC)    History of stroke       Past Medical History  Past Medical History:   Diagnosis Date    Acute laryngitis     Acute nonsuppurative otitis media, unspecified laterality     Arm weakness     Arthritis     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HCC)     Bladder infection     Bronchitis     Constipation     Cough     Diabetes (HCC)     Diabetes mellitus (HCC)     Dizziness     Dysfunction of eustachian tube     Erectile dysfunction of non-organic origin     Fatigue     Glaucoma     Hiatal hernia     Hypertension     Imbalance     Leg muscle spasm     Leukocytosis 04/01/2024    MS (multiple sclerosis) (HCC)     Multiple sclerosis (HCC)     Nephrolithiasis     Neurogenic bladder     No natural teeth     Sinus pain     Spinal stenosis     Strain of thoracic region     Stroke (HCC)     Suprapubic catheter (HCC)         Past Surgical History  Past Surgical History:   Procedure Laterality Date    APPENDECTOMY      BRAIN SURGERY      Coil placed in aneurysm    CEREBRAL ANEURYSM REPAIR      CYSTOSCOPY      CYSTOSCOPY      CYSTOSCOPY  06/11/2018    CYSTOSCOPY  01/15/2021    EYE SURGERY      transscleral cyclophotocoagulation noncontact YAG laser    IR SUPRAPUBIC CATHETER CHECK/CHANGE/REINSERTION/UPSIZE  3/28/2024    MYRINGOTOMY      with ventilation tube insertion    DC LITHOLAPAXY SMPL/SM <2.5 CM N/A 5/7/2019    Procedure: CYSTOSCOPY, holmium laser litholapaxy of bladder stones, EXCHANGE OF SP TUBE;  Surgeon: Javy Jeffries  MD;  Location: [facility];  Service: Urology    SUPRAPUBIC CATHETER INSERTION             12/26/24 0673   PT Last Visit   PT Visit Date 12/26/24   Note Type   Note type Screen   Additional Comments PT consult received. Chart reviewed. PT screen completed. Pt bedbound & require total dependent w/ overall functional mobility & ADL's at baseline. Will D/C PT. Nsg staff to use asim lift for OOB as appropriate.     Bridger Beyer

## 2024-12-26 NOTE — PLAN OF CARE
Problem: PAIN - ADULT  Goal: Verbalizes/displays adequate comfort level or baseline comfort level  Description: Interventions:  - Encourage patient to monitor pain and request assistance  - Assess pain using appropriate pain scale  - Administer analgesics based on type and severity of pain and evaluate response  - Implement non-pharmacological measures as appropriate and evaluate response  - Consider cultural and social influences on pain and pain management  - Notify physician/advanced practitioner if interventions unsuccessful or patient reports new pain  Outcome: Progressing     Problem: INFECTION - ADULT  Goal: Absence or prevention of progression during hospitalization  Description: INTERVENTIONS:  - Assess and monitor for signs and symptoms of infection  - Monitor lab/diagnostic results  - Monitor all insertion sites, i.e. indwelling lines, tubes, and drains  - Monitor endotracheal if appropriate and nasal secretions for changes in amount and color  - [place] appropriate cooling/warming therapies per order  - Administer medications as ordered  - Instruct and encourage patient and family to use good hand hygiene technique  - Identify and instruct in appropriate isolation precautions for identified infection/condition  Outcome: Progressing  Goal: Absence of fever/infection during neutropenic period  Description: INTERVENTIONS:  - Monitor WBC    Outcome: Progressing     Problem: SAFETY ADULT  Goal: Patient will remain free of falls  Description: INTERVENTIONS:  - Educate patient/family on patient safety including physical limitations  - Instruct patient to call for assistance with activity   - Consult OT/PT to assist with strengthening/mobility   - Keep Call bell within reach  - Keep bed low and locked with side rails adjusted as appropriate  - Keep care items and personal belongings within reach  - Initiate and maintain comfort rounds  - Make Fall Risk Sign visible to staff  - Offer Toileting every 2 Hours,  in advance of need  - Initiate/Maintain bedalarm  - Obtain necessary fall risk management equipment:   - Apply yellow socks and bracelet for high fall risk patients  - Consider moving patient to room near nurses station  Outcome: Progressing  Goal: Maintain or return to baseline ADL function  Description: INTERVENTIONS:  -  Assess patient's ability to carry out ADLs; assess patient's baseline for ADL function and identify physical deficits which impact ability to perform ADLs (bathing, care of mouth/teeth, toileting, grooming, dressing, etc.)  - Assess/evaluate cause of self-care deficits   - Assess range of motion  - Assess patient's mobility; develop plan if impaired  - Assess patient's need for assistive devices and provide as appropriate  - Encourage maximum independence but intervene and supervise when necessary  - Involve family in performance of ADLs  - Assess for home care needs following discharge   - Consider OT consult to assist with ADL evaluation and planning for discharge  - Provide patient education as appropriate  Outcome: Progressing  Goal: Maintains/Returns to pre admission functional level  Description: INTERVENTIONS:  - Perform AM-PAC 6 Click Basic Mobility/ Daily Activity assessment daily.  - Set and communicate daily mobility goal to care team and patient/family/caregiver.   - Collaborate with rehabilitation services on mobility goals if consulted  - Out of bed for toileting  - Record patient progress and toleration of activity level   Outcome: Progressing     Problem: DISCHARGE PLANNING  Goal: Discharge to home or other facility with appropriate resources  Description: INTERVENTIONS:  - Identify barriers to discharge w/patient and caregiver  - Arrange for needed discharge resources and transportation as appropriate  - Identify discharge learning needs (meds, wound care, etc.)  - Arrange for interpretive services to assist at discharge as needed  - Refer to Case Management Department for  coordinating discharge planning if the patient needs post-hospital services based on physician/advanced practitioner order or complex needs related to functional status, cognitive ability, or social support system  Outcome: Progressing     Problem: Knowledge Deficit  Goal: Patient/family/caregiver demonstrates understanding of disease process, treatment plan, medications, and discharge instructions  Description: Complete learning assessment and assess knowledge base.  Interventions:  - Provide teaching at level of understanding  - Provide teaching via preferred learning methods  Outcome: Progressing     Problem: Prexisting or High Potential for Compromised Skin Integrity  Goal: Skin integrity is maintained or improved  Description: INTERVENTIONS:  - Identify patients at risk for skin breakdown  - Assess and monitor skin integrity  - Assess and monitor nutrition and hydration status  - Monitor labs   - Assess for incontinence   - Turn and reposition patient  - Assist with mobility/ambulation  - Relieve pressure over bony prominences  - Avoid friction and shearing  - Provide appropriate hygiene as needed including keeping skin clean and dry  - Evaluate need for skin moisturizer/barrier cream  - Collaborate with interdisciplinary team   - Patient/family teaching  - Consider wound care consult   Outcome: Progressing

## 2024-12-26 NOTE — CASE MANAGEMENT
Case Management Assessment & Discharge Planning Note    Patient name Mathew Rico  Location [facility] 5 /E5 -* MRN [id number]  : 1950 Date 2024       Current Admission Date: 2024  Current Admission Diagnosis:Sepsis (HCC)   Patient Active Problem List    Diagnosis Date Noted Date Diagnosed    History of stroke 2024     Bladder wall thickening 2024     Pain of left hip 2024     Left leg pain 2024     Dizziness 2024     Pruritus 2024     Asymptomatic bacteriuria 2024     Blurred vision, bilateral 2024     Symptoms of upper respiratory infection (URI) 06/15/2024     Chest pain 2024     Acute encephalopathy 2024     GERSON on CPAP 2024     Fall 2024     Primary hypertension 2024     Type 2 diabetes mellitus without complication, without long-term current use of insulin ([facility]) 2024     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HCC) 2024     Multiple sclerosis (HCC) 2024     Urinary retention 2024     Neck pain 2024     Vitamin B deficiency 2024     Generalized weakness 2024     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     Urinary tract infection associated with cystostomy catheter  (HCC) 02/15/2024     Fall 2023     Weakness 2023     Accidental fall from chair 2023     Constipation 2023     Chronic diastolic congestive heart failure (HCC) 2023     Hyponatremia 2023     Excessive daytime sleepiness 2022     Shortness of breath 2022     Sepsis (HCC) 2021     Pancreatic mass 2020     Pancreatic lesion 2020     Bladder stones 2019     Bladder neck contracture 2019     History of CVA (cerebrovascular accident) 10/21/2018     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HCC) 2017     Diabetic neuropathy (HCC) 2016     Neurogenic bladder 2016     Thyroid nodule 2015     Cervical spinal stenosis 2014     Generalized  anxiety disorder 07/13/2014     Obstructive sleep apnea 01/04/2013     Benign colon polyp 06/19/2012     Esophageal reflux 06/19/2012     Fatty liver 06/19/2012     Glaucoma 06/19/2012     Hyperlipidemia 06/19/2012     Multiple sclerosis (HCC) 06/19/2012     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) 06/19/2012     Primary hypertension 06/11/2012       LOS (days): 1  Geometric Mean LOS (GMLOS) (days):   Days to GMLOS:     OBJECTIVE:  PATIENT READMITTED TO HOSPITAL  Risk of Unplanned Readmission Score: 44.65      Current admission status: Inpatient    Preferred Pharmacy:   [facility] #[id number] - [place] - [address]  [address] [facility] [address]  Phone: [phone] Fax: [phone]    [place], [place] - [address]  [address] [facility] [address]  Phone: [phone] Fax: [phone]    [facility] [place], [place] - [address]  [facility],  [address]  [facility],  First [address] [facility] [address]  Phone: [phone] Fax: [phone]     PHARM[facility] - [address] [address]  Phone: [phone] Fax: [phone]    Primary Care Provider: Carolina Kumari MD    Primary Insurance: [facility]  Secondary Insurance:     ASSESSMENT:  Active Health Care Proxies       Guzman Rico Health Care Representative - Brother   Primary Phone: [phone] (Home)            Readmission Root Cause  30 Day Readmission: Yes  During your hospital stay, did someone (provider, nurse, ) explain your care to you in a way you could understand?: Yes  Did you feel medically stable to leave the hospital?: Yes  Were you able to pay for your medication at the pharmacy?: Yes  Did you have reliable transportation to take you to your appointments?: Yes  During previous admission, was a post-acute recommendation made?: Yes  What post-acute resources were offered?: [facility]  Patient was readmitted due to:  sepsis, neurogenic bladder  Action Plan: IV abx, SPT in place    Patient Information  Admitted from:: Home  Mental Status: Alert  During Assessment patient was accompanied by: Not accompanied during assessment  Assessment information provided by:: Brother  Primary Caregiver: Family  Caregiver's Name:: Guzman Rico- brother alone with  Life Staff  Caregiver's Relationship to Patient:: Family Member  Caregiver's Telephone Number:: Guzman Rico (Brother) [phone] (Home Phone)  Support Systems: Family members, Home care staff  County of Residence: [place]  What city do you live in?: [place]  Home entry access options. Select all that apply.: Stairs  Number of steps to enter home.: 3  Type of Current Residence: [facility]  Living Arrangements: Lives w/ Family members  Is patient a ?: No    Activities of Daily Living Prior to Admission  Functional Status: Total dependent  Completes ADLs independently?: No  Level of ADL dependence: Total Dependent  Ambulates independently?: No  Level of ambulatory dependence: Total Dependent  Does patient use assisted devices?: Yes  Assisted Devices (DME) used: Bedside Commode, Wheelchair, Shower Chair, Hospital Bed, Walker  Does patient currently own DME?: Yes  What DME does the patient currently own?: Bedside Commode, Walker, Wheelchair, Shower Chair, Hospital Bed  Does patient have a history of Outpatient Therapy (PT/OT)?: No  Does the patient have a history of Short-Term Rehab?: Yes  Does patient have a history of HHC?: Yes  Does patient currently have HHC?: Yes    Current Home Health Care  Type of Current Home Care Services: Home health aide, Nurse visit    Patient Information Continued  Does patient have prescription coverage?: Yes  Does patient receive dialysis treatments?: No    Means of Transportation  Means of Transport to Appts:: Other (Comment) ([facility])    DISCHARGE DETAILS:    Discharge planning discussed with:: pt's brother     CM contacted family/caregiver?:  Yes  Were Treatment Team discharge recommendations reviewed with patient/caregiver?: Yes  Did patient/caregiver verbalize understanding of patient care needs?: Yes  Were patient/caregiver advised of the risks associated with not following Treatment Team discharge recommendations?: Yes    Contacts  Patient Contacts: Guzman Rico  Relationship to Patient:: Family  Contact Method: Phone  Phone Number: [phone]  Reason/Outcome: Discharge Planning    DME Referral Provided  Referral made for DME?: No     Transport at Discharge : [facility]     Additional Comments: Cm spoke with pt's brother regarding cm role. Pt lives with his brother and sister in law in a ranch home with 3STE. Pt is total dependent and has a wc, BSC, shower chair, hospital bed and walker. He has had STR and gets home health aide and some therapy through [facility]. Brother unable to account how often [facility] comes. Amrik called [facility] at [phone] and spoke to Narda and she staetd cm would need to speak with pt's [facility] RN to know what services and how often he gets them. She sent her a message to call . Amrik has not received call today yet. Per prior notes, amrik is aware, [facility] told cm team at last admission a asim lift was not feasible in [facility] home and pt had reported he does not get out of bed as they fear he will fall. Amrik also noted from prior notes that DC information and medications scripts will need to be sent to [facility] via Fax at [phone].

## 2024-12-26 NOTE — OCCUPATIONAL THERAPY NOTE
Occupational Therapy Screen       12/26/24 1420   OT Last Visit   OT Visit Date 12/26/24   Note Type   Note type Screen   Additional Comments OT referral received. Completed OT screen. Pt bedbound at baseline and currently functioning at baseline level with no acute OT needs at this time. Will d/c pt at this time and reassess if medically needed.     Janice Vigil, OT      Patient Name: Mathew Rico  Today's Date: 12/26/2024

## 2024-12-26 NOTE — UTILIZATION REVIEW
Initial Clinical Review    Admission: Date/Time/Statement:   Admission Orders (From admission, onward)       Ordered        12/25/24 1417  INPATIENT ADMISSION  Once                          Orders Placed This Encounter   Procedures    INPATIENT ADMISSION     Standing Status:   Standing     Number of Occurrences:   1     Level of Care:   Med Surg [16]     Estimated length of stay:   More than 2 Midnights     Certification:   I certify that inpatient services are medically necessary for this patient for a duration of greater than two midnights. See H&P and MD Progress Notes for additional information about the patient's course of treatment.     ED Arrival Information       Expected   -    Arrival   12/25/2024 10:50    Acuity   Emergent              Means of arrival   Ambulance    Escorted by   [facility] ([facility])    Service   Hospitalist    Admission type   Emergency              Arrival complaint   Psych Eval             Chief Complaint   Patient presents with    Altered Mental Status     Pt arrives via ems, from home. Per ems pt has been more confused, lethargic, complaining of HA, leg pain, fever of 100.1. Pt unable to answer orientation questions.        Initial Presentation: 74 y.o. male presents to ED via   EMS  from home with fever, tachycardia  and confusion.  PMH  is  neurogenic bladder S/P  SPT, HTN, DM with neuropathy,   MS,  stercoral colitis, UTI and stroke.  S/P  catheter changed in  ED and  Ua shows + blood/nitrite/leukocyte.  Met sepsis criteria with fevers, tachycardia and  deranged BP.  Given  1 L  NS  and SHARON  in ED.  Admit  Ip with  Sepsis  and plan is   SHARON, monitor labs, blood  cultures, IVF  X  14 hours  and  continue home meds.        Date:    12/26   Day 2:   Urology consult  Remain on  SHARON and  F/U culture.  SPT  remains intact and draining  clear yellow urine.  Feels improved, has ongoing fatigue.   Remained febrile overnight  to 101.1.  IVF  d/c  this am.    Date:    12/27   Day 3: Has surpassed a 2nd midnight with active treatments and services.  Cough productive for green sputum this  am.  Lethargic,  answered quickly  to name, opened eyes, answered all questions.  Coarse breath sounds  all lung fields. Continue  SHARON.     Continue  bronchodilators, all current meds.  F/U sputum culture.       ED Treatment-Medication Administration from 12/25/2024 1050 to 12/25/2024 1540         Date/Time Order Dose Route Action     12/25/2024 1155 acetaminophen (TYLENOL) rectal suppository 650 mg 650 mg Rectal Given     12/25/2024 1144 sodium chloride 0.9 % bolus 500 mL 500 mL Intravenous New Bag     12/25/2024 1241 lidocaine (URO-JET) 2 % urethral/mucosal gel 1 Application 1 Application Urethral Given by Other     12/25/2024 1314 sodium chloride 0.9 % bolus 500 mL 500 mL Intravenous New Bag     12/25/2024 1346 ibuprofen (MOTRIN) tablet 600 mg 600 mg Oral Given     12/25/2024 1413 cefepime (MAXIPIME) 2 g/50 mL dextrose IVPB 2,000 mg Intravenous New Bag     12/25/2024 1511 iohexol (OMNIPAQUE) 350 MG/ML injection (MULTI-DOSE) 100 mL 100 mL Intravenous Given            Scheduled Medications:  amLODIPine, 10 mg, Oral, Daily   And  atorvastatin, 80 mg, Oral, Daily With Dinner  baclofen, 5 mg, Oral, TID  budesonide-formoterol, 2 puff, Inhalation, BID  cefepime, 2,000 mg, Intravenous, Q12H  clopidogrel, 75 mg, Oral, Daily  cromolyn, 1 spray, Each Nare, TID  cyanocobalamin, 500 mcg, Oral, Daily  gabapentin, 300 mg, Oral, TID  [Held by provider] gabapentin, 600 mg, Oral, HS  heparin (porcine), 5,000 Units, Subcutaneous, Q8H CIERA  insulin lispro, 1-5 Units, Subcutaneous, 4x Daily (PC & HS)  latanoprost, 1 drop, Both Eyes, HS  methenamine hippurate, 1 g, Oral, BID With Meals  mirtazapine, 7.5 mg, Oral, HS  pantoprazole, 40 mg, Oral, Early Morning  polyethylene glycol, 17 g, Oral, BID  propranolol, 60 mg, Oral, Daily  senna-docusate sodium, 2 tablet, Oral, HS      Continuous IV Infusions:  IVF  100/hr - d/c   12/26     PRN Meds:  acetaminophen, 650 mg, Oral, Q6H PRN  albuterol, 2.5 mg, Nebulization, Q6H PRN  bisacodyl, 10 mg, Rectal, Daily PRN  meclizine, 12.5 mg, Oral, Q8H PRN  melatonin, 3 mg, Oral, HS PRN      ED Triage Vitals   Temperature Pulse Respirations Blood Pressure SpO2 Pain Score   12/25/24 1057 12/25/24 1057 12/25/24 1057 12/25/24 1057 12/25/24 1057 12/25/24 1155   100.1 °F (37.8 °C) 100 18 157/75 96 % Med Not Given for Pain - for MAR use only     Weight (last 2 days)       Date/Time Weight    12/26/24 0408 68.4 (150.79)            Vital Signs (last 3 days)       Date/Time Temp Pulse Resp BP MAP (mmHg) SpO2 O2 Device Patient Position - Orthostatic VS Annamarie Coma Scale Score Pain    12/26/24 0845 -- -- -- -- -- -- -- -- -- No Pain    12/26/24 07:47:16 98.3 °F (36.8 °C) 90 16 130/54 79 96 % -- -- -- --    12/26/24 05:04:16 100.9 °F (38.3 °C) 104 -- -- -- 93 % -- -- -- --    12/26/24 0408 -- -- -- -- -- -- -- -- -- Med Not Given for Pain - for MAR use only    12/26/24 03:56:45 100.5 °F (38.1 °C) 108 -- -- -- 95 % -- -- -- --    12/26/24 03:22:07 101.1 °F (38.4 °C) 111 -- -- -- 95 % -- -- -- --    12/26/24 0249 -- -- -- -- -- -- -- -- -- Med Not Given for Pain - for MAR use only    12/26/24 02:41:23 101 °F (38.3 °C) 109 -- -- -- 95 % -- -- -- --    12/25/24 23:30:53 98.4 °F (36.9 °C) 111 16 124/75 91 96 % None (Room air) Lying -- --    12/25/24 2025 -- -- -- -- -- -- -- -- -- No Pain    12/25/24 15:40:10 98.1 °F (36.7 °C) 90 18 106/48 67 91 % -- -- -- --    12/25/24 1445 -- 92 18 108/46 73 94 % None (Room air) Lying -- --    12/25/24 1430 -- 92 18 97/49 64 93 % None (Room air) Lying -- --    12/25/24 1415 -- 88 18 131/80 104 94 % None (Room air) Lying -- --    12/25/24 1400 -- 88 18 121/51 68 97 % None (Room air) Lying -- --    12/25/24 1346 -- -- -- -- -- -- -- -- -- Med Not Given for Pain - for MAR use only    12/25/24 1345 -- 92 18 140/55 73 97 % None (Room air) Lying -- --    12/25/24 1330 -- 90 18 124/42  66 96 % None (Room air) Lying -- --    12/25/24 1315 -- 92 18 118/52 76 95 % None (Room air) Lying -- --    12/25/24 1300 102 °F (38.9 °C) 94 18 135/80 93 96 % None (Room air) Lying -- --    12/25/24 1245 -- 96 18 137/58 72 97 % None (Room air) Lying -- --    12/25/24 1215 -- 104 18 139/50 74 94 % None (Room air) Lying -- --    12/25/24 1158 -- 100 18 111/44 53 96 % None (Room air) Lying -- --    12/25/24 1155 -- -- -- -- -- -- -- -- -- Med Not Given for Pain - for MAR use only    12/25/24 1123 102.2 °F (39 °C) -- -- -- -- -- -- -- -- --    12/25/24 1109 -- -- -- -- -- -- None (Room air) -- -- --    12/25/24 1105 -- -- -- -- -- -- -- -- 14 --    12/25/24 1057 100.1 °F (37.8 °C) 100 18 157/75 118 96 % None (Room air) Lying -- --              Pertinent Labs/Diagnostic Test Results:   Radiology:  CT abdomen pelvis w contrast   Final Interpretation by Jimbo Lux MD (12/25 1632)      Diffuse urinary bladder wall thickening consistent with cystitis. Cage catheter terminates within the urinary bladder which is predominantly air-filled.      Persistent rectal wall thickening, however compared to prior exam there is decreased stool burden within the rectum.      Examination marked in EPIC for immediate notification.      Workstation performed: [id number]         XR chest 1 view portable   ED Interpretation by Payton Mcelroy DO (12/25 1203)   .vlx      Final Interpretation by Kasandra Downs MD (12/25 1156)      No acute cardiopulmonary disease.            Workstation performed: [id number]                 Results from last 7 days   Lab Units 12/26/24  0439 12/25/24  1453 12/25/24  1133   WBC Thousand/uL 8.71  --  10.09   HEMOGLOBIN g/dL 12.5  --  14.8   HEMATOCRIT % 38.5  --  46.6   PLATELETS Thousands/uL 257 332 309   TOTAL NEUT ABS Thousands/µL 6.45  --  7.04         Results from last 7 days   Lab Units 12/26/24  0439 12/25/24  1133   SODIUM mmol/L 133* 135   POTASSIUM mmol/L 3.5 3.9   CHLORIDE mmol/L 105 99    CO2 mmol/L 20* 25   ANION GAP mmol/L 8 11   BUN mg/dL 12 9   CREATININE mg/dL 0.76 0.95   EGFR ml/min/1.73sq m 89 77   CALCIUM mg/dL 8.6 9.9     Results from last 7 days   Lab Units 12/25/24  1133   AST U/L 13   ALT U/L 10   ALK PHOS U/L 94   TOTAL PROTEIN g/dL 8.5*   ALBUMIN g/dL 4.0   TOTAL BILIRUBIN mg/dL 0.49     Results from last 7 days   Lab Units 12/26/24  0748 12/25/24  2119 12/25/24  1718   POC GLUCOSE mg/dl 110 113 102     Results from last 7 days   Lab Units 12/26/24  0439 12/25/24  1133   GLUCOSE RANDOM mg/dL 127 106           Results from last 7 days   Lab Units 12/25/24  1133   PROTIME seconds 12.8   INR  0.94   PTT seconds 22*         Results from last 7 days   Lab Units 12/26/24  0439 12/25/24  1133   PROCALCITONIN ng/ml 0.10 0.10     Results from last 7 days   Lab Units 12/25/24  1452 12/25/24  1133   LACTIC ACID mmol/L 1.4 2.9*               Results from last 7 days   Lab Units 12/25/24  1352   CLARITY UA  Cloudy   COLOR UA  Yellow   SPEC GRAV UA  1.025   PH UA  5.5   GLUCOSE UA mg/dl 500 (1/2%)*   KETONES UA mg/dl Trace*   BLOOD UA  Moderate*   PROTEIN UA mg/dl 30 (1+)*   NITRITE UA  Positive*   BILIRUBIN UA  Negative   UROBILINOGEN UA E.U./dl 0.2   LEUKOCYTES UA  Small*   WBC UA /hpf Innumerable*   RBC UA /hpf Innumerable*   BACTERIA UA /hpf Moderate*   EPITHELIAL CELLS WET PREP /hpf None Seen   MUCUS THREADS  Occasional*                   Results from last 7 days   Lab Units 12/25/24  1142 12/25/24  1133   BLOOD CULTURE  Received in Microbiology Lab. Culture in Progress. Received in Microbiology Lab. Culture in Progress.               Present on Admission:   Type 2 diabetes mellitus without complication, without long-term current use of insulin (HCC)   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)   Stercoral colitis   Primary hypertension   Neurogenic bladder   Multiple sclerosis (HCC)   Sepsis (HCC)   Urinary tract infection associated with cystostomy catheter  (HCC)      Admitting  Diagnosis: UTI (urinary tract infection) [N39.0]  History of stroke [Z86.73]  Severe sepsis (HCC) [A41.9, R65.20]  Age/Sex: 74 y.o. male    Network Utilization Review Department  ATTENTION: Please call with any questions or concerns to [phone] and carefully listen to the prompts so that you are directed to the right person. All voicemails are confidential.   For Discharge needs, contact Care Management DC Support Team at [phone] opt. [phone]  Send all requests for admission clinical reviews, approved or denied determinations and any other requests to dedicated fax number below belonging to the campus where the patient is receiving treatment. List of dedicated fax numbers for the Facilities:  FACILITY NAME UR FAX NUMBER   ADMISSION DENIALS (Administrative/Medical Necessity) [phone]   DISCHARGE SUPPORT TEAM (NETWORK) [phone]   PARENT CHILD HEALTH (Maternity/NICU/Pediatrics) [phone]   [facility] [phone]   [facility] [phone]   [facility] [phone]   [facility] [phone]   [facility] [phone]   [facility] [phone]   [facility] [phone]   [facility] [phone]   [facility] [phone]   [facility] [phone]   [facility] [phone]   [facility] [phone]

## 2024-12-26 NOTE — CONSULTS
Consult - Urology   Mathew Rico 1950, 74 y.o. male MRN: [id number]    Unit/Bed#: E5 -01 Encounter: [id number]      Neurogenic bladder  Assessment & Plan  Chronic SPT in place 2/2 MS   Exchanged at presentation to ED 12/25   Remains in place draining clear yellow urine     Plan:  Maintain SPT   Return to office for scheduled q4week exchanges     * Sepsis (HCC)  Assessment & Plan  Presented to the ED 12/25 with complaints of fatigue, headache and fevers  Sepsis criteria -- febrile, tachycardic, and elevated lactic acid  SPT exchanged in ED; urine sample was obtained which showed positive blood/nitrite/leukocyte  CT a/p -- diffuse urinary bladder wall thickening consistent with cystitis. Cgae catheter terminates within the urinary bladder which is predominantly air-filled (likely due to recent spt change)   Initiated on cefepime in ED  Low grade fevers continued overnight  Creatinine and WBC stable      Plan:  Continue with IV antibiotics cefepime; await repeat UC, tailor accordingly   Pain has improved   Maintian SPT      Subjective:   Mathew is a 74 year old male with PMX of MS and neurogenic bladder with chronic SPT in place ---  who presented to [facility] ED 12/25 with fatigue, headache and fevers. SIRS at presentation -- febrile, tachycardic, and elevated lactic acid. Creatinine stable, no leukocytosis. SPT exchanged in ED 12/25. UA with nitrites and leukocytes, suspicious for infection. Initiated on cefepime in ED.     Today in consultation, SPT remains in place draining clear yellow urine. Patient feels much better overall today, ongoing fatigue. Prior abdominal pain has resolved. Fevers continued overnight, tmax 101.1 F.     Urology will sign off but remain available for any further inpatient needs. Please feel free to contact the provider currently covering the Urology Epic Chat role for this campus with questions or concerns.    Review of Systems   Constitutional:  Positive for activity change,  appetite change and fatigue. Negative for chills and fever.   Respiratory:  Negative for apnea, cough and shortness of breath.    Cardiovascular:  Negative for chest pain and leg swelling.   Gastrointestinal:  Negative for abdominal distention, abdominal pain, constipation, diarrhea, nausea and vomiting.   Genitourinary:  Positive for difficulty urinating (SPT in place). Negative for decreased urine volume, dysuria, flank pain, frequency, hematuria, penile pain, testicular pain and urgency.   Psychiatric/Behavioral: Negative.         Objective:  Vitals: Blood pressure 130/54, pulse 90, temperature 98.3 °F (36.8 °C), resp. rate 16, weight 68.4 kg (150 lb 12.7 oz), SpO2 96%.,Body mass index is 25.09 kg/m².    Physical Exam  Vitals and nursing note reviewed.   Constitutional:       General: He is not in acute distress.     Appearance: Normal appearance. He is not ill-appearing.   HENT:      Head: Normocephalic and atraumatic.      Mouth/Throat:      Pharynx: Oropharynx is clear.   Eyes:      Extraocular Movements: Extraocular movements intact.      Conjunctiva/sclera: Conjunctivae normal.   Cardiovascular:      Rate and Rhythm: Normal rate.   Pulmonary:      Effort: Pulmonary effort is normal.   Abdominal:      General: Abdomen is flat. There is no distension.      Palpations: Abdomen is soft.      Tenderness: There is no abdominal tenderness.      Comments: SPT in place draining clear yellow urine    Musculoskeletal:         General: Normal range of motion.      Cervical back: Normal range of motion.   Skin:     General: Skin is warm and dry.   Neurological:      General: No focal deficit present.      Mental Status: He is alert and oriented to person, place, and time.   Psychiatric:         Mood and Affect: Mood normal.         Behavior: Behavior normal.         Imaging:    CT ABDOMEN AND PELVIS WITH IV CONTRAST    INDICATION: abdomen pain. .    COMPARISON: CT abdomen pelvis 12/14/2024    TECHNIQUE: CT examination of  the abdomen and pelvis was performed. Multiplanar 2D reformatted images were created from the source data.    This examination, like all CT scans performed in the [facility], was performed utilizing techniques to minimize radiation dose exposure, including the use of iterative reconstruction and automated exposure control. Radiation dose length  product (DLP) for this visit: 655 mGy-cm    IV Contrast: 100 mL of iohexol (OMNIPAQUE)  Enteric Contrast: Not administered.    FINDINGS:    ABDOMEN    LOWER CHEST: Coronary artery, aortic and mitral calcifications.    LIVER/BILIARY TREE: Small unchanged area of hypodensity along the hepatic fissure may represent focal fatty infiltration.    GALLBLADDER: No calcified gallstones. No pericholecystic inflammatory change.    SPLEEN: Unremarkable.    PANCREAS: Unremarkable.    ADRENAL GLANDS: Unremarkable.    KIDNEYS/URETERS: Stable left renal cysts and hypodensities too small to characterize. No hydronephrosis of either kidney.    STOMACH AND BOWEL: Persistent rectal wall thickening however decreased stool burden within the rectum as compared to prior exam.    APPENDIX: No findings to suggest appendicitis.    ABDOMINOPELVIC CAVITY: No ascites. No pneumoperitoneum. No lymphadenopathy.    VESSELS: Atherosclerotic vascular calcifications.    PELVIS    REPRODUCTIVE ORGANS: Unremarkable for patient's age.    URINARY BLADDER: Cage catheter terminates within the urinary bladder. Diffuse urinary bladder wall thickening. Bladder is predominantly air-filled.    ABDOMINAL WALL/INGUINAL REGIONS: Small left greater than right inguinal fat-containing hernias.    BONES: No acute fracture or suspicious osseous lesion.   Impression:       Diffuse urinary bladder wall thickening consistent with cystitis. Cage catheter terminates within the urinary bladder which is predominantly air-filled.    Persistent rectal wall thickening, however compared to prior exam there is decreased  stool burden within the rectum.     Imaging reviewed - both report and images personally reviewed.     Labs:  Recent Labs     24  1133 24  0439   WBC 10.09 8.71     Recent Labs     24  1133 24  0439   HGB 14.8 12.5       Recent Labs     24  1133 24  0439   CREATININE 0.95 0.76       Microbiology:  Collected Updated Procedure Result Status Patient Facility Result Comment    2024 1352 2024 1409 Urine culture [[id number]]   Urine, Suprapubic catheter    In process [facility]  Component Value   No component results          History:  Social History     Socioeconomic History    Marital status: Single     Spouse name: Not on file    Number of children: Not on file    Years of education: Not on file    Highest education level: Not on file   Occupational History    Occupation:    retired   Tobacco Use    Smoking status: Former     Current packs/day: 0.00     Average packs/day: 0.5 packs/day for 31.0 years (15.5 ttl pk-yrs)     Types: Cigarettes     Start date:      Quit date:      Years since quittin.0    Smokeless tobacco: Never   Vaping Use    Vaping status: Never Used   Substance and Sexual Activity    Alcohol use: Not Currently    Drug use: No    Sexual activity: Not Currently   Other Topics Concern    Not on file   Social History Narrative    ** Merged History Encounter **          Social Drivers of Health     Financial Resource Strain: Not on file   Food Insecurity: No Food Insecurity (2024)    Nursing - Inadequate Food Risk Classification     Worried About Running Out of Food in the Last Year: Never true     Ran Out of Food in the Last Year: Never true     Ran Out of Food in the Last Year: 1   Transportation Needs: No Transportation Needs (2024)    Nursing - Transportation Risk Classification     Lack of Transportation: Not on file     Lack of Transportation: 2   Physical Activity: Not on file   Stress:  Not on file   Social Connections: Not on file   Intimate Partner Violence: Unknown (2024)    Nursing IPS     Feels Physically and Emotionally Safe: Not on file     Physically Hurt by Someone: Not on file     Humiliated or Emotionally Abused by Someone: Not on file     Physically Hurt by Someone: 2     Hurt or Threatened by Someone: 2   Housing Stability: Unknown (2024)    Nursing: Inadequate Housing Risk Classification     Has Housing: Not on file     Worried About Losing Housing: Not on file     Unable to Get Utilities: Not on file     Unable to Pay for Housing in the Last Year: 2     Has Housin       Past Medical History:   Diagnosis Date    Acute laryngitis     Acute nonsuppurative otitis media, unspecified laterality     Arm weakness     Arthritis     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HCC)     Bladder infection     Bronchitis     Constipation     Cough     Diabetes (HCC)     Diabetes mellitus (HCC)     Dizziness     Dysfunction of eustachian tube     Erectile dysfunction of non-organic origin     Fatigue     Glaucoma     Hiatal hernia     Hypertension     Imbalance     Leg muscle spasm     Leukocytosis 2024    MS (multiple sclerosis) ([facility])     Multiple sclerosis ([facility])     Nephrolithiasis     Neurogenic bladder     No natural teeth     Sinus pain     Spinal stenosis     Strain of thoracic region     Stroke ([facility])     Suprapubic catheter ([facility])      Past Surgical History:   Procedure Laterality Date    APPENDECTOMY      BRAIN SURGERY      Coil placed in aneurysm    CEREBRAL ANEURYSM REPAIR      CYSTOSCOPY      CYSTOSCOPY      CYSTOSCOPY  2018    CYSTOSCOPY  01/15/2021    EYE SURGERY      transscleral cyclophotocoagulation noncontact YAG laser    IR SUPRAPUBIC CATHETER CHECK/CHANGE/REINSERTION/UPSIZE  3/28/2024    MYRINGOTOMY      with ventilation tube insertion    TX LITHOLAPAXY SMPL/SM <2.5 CM N/A 2019    Procedure: CYSTOSCOPY, holmium laser litholapaxy of bladder stones, EXCHANGE OF SP TUBE;   Surgeon: Javy Jeffries MD;  Location: [facility];  Service: Urology    SUPRAPUBIC CATHETER INSERTION       Family History   Problem Relation Age of Onset    Heart attack Mother     Stroke Mother     Heart attack Father     Anuerysm Father         In Stomach     Aneurysm Father        Octavia Storey PA-C  Date: 12/26/2024 Time: 1:17 PM

## 2024-12-26 NOTE — ASSESSMENT & PLAN NOTE
Patient has chronic suprapubic catheter, changed during this admission in the ED.  Appreciate urology input  Follow outpatient with urology, has SPT tube changes every 28 days

## 2024-12-26 NOTE — PROGRESS NOTES
Progress Note - Hospitalist   Name: Mathew Rico 74 y.o. male I MRN: [id number]  Unit/Bed#: E5 -01 I Date of Admission: 12/25/2024   Date of Service: 12/26/2024 I Hospital Day: 1     Assessment & Plan  Sepsis (HCC)  74-year-old male with PMH of MS, paraplegia, neurogenic bladder with SPT and HTN presented with fevers  Met sepsis criteria with fevers, tachycardia with urinary source  SPT tube change on 12/25 in the ED  Continue antibiotics with cefepime, previous cultures grew Enterobacter  Await blood cultures at this time  Neurogenic bladder  Patient has chronic suprapubic catheter, changed during this admission in the ED.  Appreciate urology input  Follow outpatient with urology, has SPT tube changes every 28 days  Primary hypertension  Continue amlodipine, and propranolol with BP hold parameters  Blood pressure currently controlled  Type 2 diabetes mellitus with neuropathy ([facility])  Lab Results   Component Value Date    HGBA1C 9.0 (H) 10/06/2024       Recent Labs     12/25/24  1718 12/25/24  2119 12/26/24  0748 12/26/24  1136   POCGLU 102 113 110 115       Blood Sugar Average: Last 72 hrs:  (P) 110  Patient on metformin and Jardiance currently on hold  Monitor on diabetic diet, sliding scale and Accu-Cheks  Stercoral colitis  Patient has issues with chronic constipation and had multiple admissions in the past with severe abdominal pain due to sterile coral colitis and constipation.    Continue bowel regimen with MiraLAX and Senokot  Urinary tract infection associated with cystostomy catheter  (HCC)  As above under sepsis  Multiple sclerosis (HCC)  Patient has chronic urinary retention status post suprapubic catheter.  Continue follow-up with neurology as previously recommended  History of stroke  Continue Plavix and statin    VTE Pharmacologic Prophylaxis: Heparin  Mechanical VTE Prophylaxis in Place: Yes    Current Length of Stay: 1 day(s)    Current Patient Status: Inpatient   Certification Statement:  The patient will continue to require additional inpatient hospital stay due to IV abx, await cultures    Discharge Plan: pending, 24-48 hours    Code Status: Level 1 - Full Code    Subjective:   No events overnight.  Reports feeling well overall.  Denies any further fevers, abdominal pain, nausea or vomiting.    Objective:     Vitals:   Temp (24hrs), Av.1 °F (37.8 °C), Min:98.1 °F (36.7 °C), Max:102.3 °F (39.1 °C)    Temp:  [98.1 °F (36.7 °C)-102.3 °F (39.1 °C)] 102.3 °F (39.1 °C)  HR:  [] 91  Resp:  [16-18] 16  BP: (106-130)/(48-75) 130/54  SpO2:  [91 %-96 %] 94 %  Body mass index is 25.09 kg/m².     Input and Output Summary (last 24 hours):       Intake/Output Summary (Last 24 hours) at 2024 1527  Last data filed at 2024 1434  Gross per 24 hour   Intake 600 ml   Output 2550 ml   Net -1950 ml       Physical Exam:     Physical Exam  Vitals and nursing note reviewed.   Cardiovascular:      Rate and Rhythm: Normal rate.   Pulmonary:      Effort: Pulmonary effort is normal.      Breath sounds: No wheezing.   Abdominal:      General: There is no distension.      Palpations: Abdomen is soft.   Genitourinary:     Comments: Suprapubic catheter  Musculoskeletal:         General: No swelling.      Right lower leg: No edema.      Left lower leg: No edema.      Comments: Lower extremity contractures   Skin:     General: Skin is warm.   Neurological:      Mental Status: He is alert. Mental status is at baseline.     Additional Data:     Labs:    Results from last 7 days   Lab Units 24  0439   WBC Thousand/uL 8.71   HEMOGLOBIN g/dL 12.5   HEMATOCRIT % 38.5   PLATELETS Thousands/uL 257   SEGS PCT % 75   LYMPHO PCT % 10*   MONO PCT % 11   EOS PCT % 3     Results from last 7 days   Lab Units 24  0439 24  1133   SODIUM mmol/L 133* 135   POTASSIUM mmol/L 3.5 3.9   CHLORIDE mmol/L 105 99   CO2 mmol/L 20* 25   BUN mg/dL 12 9   CREATININE mg/dL 0.76 0.95   ANION GAP mmol/L 8 11   CALCIUM mg/dL  8.6 9.9   ALBUMIN g/dL  --  4.0   TOTAL BILIRUBIN mg/dL  --  0.49   ALK PHOS U/L  --  94   ALT U/L  --  10   AST U/L  --  13   GLUCOSE RANDOM mg/dL 127 106     Results from last 7 days   Lab Units 12/25/24  1133   INR  0.94     Results from last 7 days   Lab Units 12/26/24  1136 12/26/24  0748 12/25/24  2119 12/25/24  1718   POC GLUCOSE mg/dl 115 110 113 102         Results from last 7 days   Lab Units 12/26/24  0439 12/25/24  1452 12/25/24  1133   LACTIC ACID mmol/L  --  1.4 2.9*   PROCALCITONIN ng/ml 0.10  --  0.10           * I Have Reviewed All Lab Data Listed Above.  * Additional Pertinent Lab Tests Reviewed: All Labs For Current Hospital Admission Reviewed    Mobility:  Basic Mobility Inpatient Raw Score: 12  [facility] Goal: 4: Move to chair/commode  [facility] Achieved: 4: Move to chair/commode    Lines:     Invasive Devices       Peripheral Intravenous Line  Duration             Peripheral IV 12/25/24 Dorsal (posterior);Left Forearm 1 day    Peripheral IV 12/25/24 Dorsal (posterior);Right Wrist 1 day              Drain  Duration             Suprapubic Catheter 20 Fr. 16 days                       Imaging:    Imaging Reports Reviewed Today Include:     CT abdomen pelvis w contrast  Result Date: 12/25/2024  Impression: Diffuse urinary bladder wall thickening consistent with cystitis. Cage catheter terminates within the urinary bladder which is predominantly air-filled. Persistent rectal wall thickening, however compared to prior exam there is decreased stool burden within the rectum. Examination marked in EPIC for immediate notification. Workstation performed: [id number]     XR chest 1 view portable  Result Date: 12/25/2024  Impression: No acute cardiopulmonary disease. Workstation performed: [id number]         Recent Cultures (last 7 days):     Results from last 7 days   Lab Units 12/25/24  1142 12/25/24  1133   BLOOD CULTURE  Received in Microbiology Lab. Culture in Progress. Received in Microbiology Lab. Culture in  Progress.       Last 24 Hours Medication List:   Current Facility-Administered Medications   Medication Dose Route Frequency Provider Last Rate    acetaminophen  650 mg Oral Q6H PRN Harper Pollack PA-C      albuterol  2.5 mg Nebulization Q6H PRN Annemarie Mello MD      amLODIPine  10 mg Oral Daily Annemarie Mello MD      And    atorvastatin  80 mg Oral Daily With Dinner Annemarie Mello MD      baclofen  5 mg Oral TID Annemarie Mello MD      bisacodyl  10 mg Rectal Daily PRN Annemarie Mello MD      budesonide-formoterol  2 puff Inhalation BID Annemarie Mello MD      cefepime  2,000 mg Intravenous Q12H Annemarie Mello MD 2,000 mg (12/26/24 1437)    clopidogrel  75 mg Oral Daily Annemarie Mello MD      cromolyn  1 spray Each Nare TID Annemarie Mello MD      cyanocobalamin  500 mcg Oral Daily Annemarie Mello MD      gabapentin  300 mg Oral TID Annemarie Mello MD      [Held by provider] gabapentin  600 mg Oral HS Annemarie Mello MD      heparin (porcine)  5,000 Units Subcutaneous Q8H [facility] Annemarie Mello MD      insulin lispro  1-5 Units Subcutaneous 4x Daily (PC & HS) Annemarie Mello MD      latanoprost  1 drop Both Eyes HS Annemarie Mello MD      meclizine  12.5 mg Oral Q8H PRN Annemarie Mello MD      melatonin  3 mg Oral HS PRN Annemarie Mello MD      methenamine hippurate  1 g Oral BID With Meals Annemarie Mello MD      mirtazapine  7.5 mg Oral HS Annemarie Mello MD      pantoprazole  40 mg Oral Early Morning Annemarie Mello MD      polyethylene glycol  17 g Oral BID Annemarie Mello MD      propranolol  60 mg Oral Daily Annemarie Mello MD      senna-docusate sodium  2 tablet Oral HS Annemarie Mello MD          Today, Patient Was Seen By: Mahad Perea MD    ** Please Note: Dictation voice to text software may have been used in the creation of this document. **

## 2024-12-26 NOTE — ASSESSMENT & PLAN NOTE
74-year-old male with PMH of MS, paraplegia, neurogenic bladder with SPT and HTN presented with fevers  Met sepsis criteria with fevers, tachycardia with urinary source  SPT tube change on 12/25 in the ED  Continue antibiotics with cefepime, previous cultures grew Enterobacter  Await blood cultures at this time

## 2024-12-26 NOTE — ASSESSMENT & PLAN NOTE
Patient has issues with chronic constipation and had multiple admissions in the past with severe abdominal pain due to sterile coral colitis and constipation.    Continue bowel regimen with MiraLAX and Senokot

## 2024-12-27 ENCOUNTER — APPOINTMENT (INPATIENT)
Dept: CT IMAGING | Facility: HOSPITAL | Age: 74
DRG: 698 | End: 2024-12-27
Payer: MEDICARE

## 2024-12-27 LAB
BACTERIA UR CULT: NORMAL
GLUCOSE SERPL-MCNC: 106 MG/DL (ref 65–140)
GLUCOSE SERPL-MCNC: 112 MG/DL (ref 65–140)
GLUCOSE SERPL-MCNC: 148 MG/DL (ref 65–140)
GLUCOSE SERPL-MCNC: 94 MG/DL (ref 65–140)
L PNEUMO1 AG UR QL IA.RAPID: NEGATIVE
S PNEUM AG UR QL: NEGATIVE

## 2024-12-27 PROCEDURE — 87081 CULTURE SCREEN ONLY: CPT | Performed by: INTERNAL MEDICINE

## 2024-12-27 PROCEDURE — 87449 NOS EACH ORGANISM AG IA: CPT | Performed by: INTERNAL MEDICINE

## 2024-12-27 PROCEDURE — 94760 N-INVAS EAR/PLS OXIMETRY 1: CPT

## 2024-12-27 PROCEDURE — 94640 AIRWAY INHALATION TREATMENT: CPT

## 2024-12-27 PROCEDURE — 71250 CT THORAX DX C-: CPT

## 2024-12-27 PROCEDURE — 94664 DEMO&/EVAL PT USE INHALER: CPT

## 2024-12-27 PROCEDURE — 99232 SBSQ HOSP IP/OBS MODERATE 35: CPT | Performed by: INTERNAL MEDICINE

## 2024-12-27 PROCEDURE — 82948 REAGENT STRIP/BLOOD GLUCOSE: CPT

## 2024-12-27 RX ORDER — VANCOMYCIN HYDROCHLORIDE 750 MG/150ML
750 INJECTION, SOLUTION INTRAVENOUS EVERY 12 HOURS
Status: DISCONTINUED | OUTPATIENT
Start: 2024-12-28 | End: 2024-12-29

## 2024-12-27 RX ORDER — LEVALBUTEROL INHALATION SOLUTION 1.25 MG/3ML
1.25 SOLUTION RESPIRATORY (INHALATION)
Status: DISCONTINUED | OUTPATIENT
Start: 2024-12-27 | End: 2024-12-31 | Stop reason: HOSPADM

## 2024-12-27 RX ORDER — GUAIFENESIN 100 MG/5ML
200 SOLUTION ORAL EVERY 4 HOURS PRN
Status: DISCONTINUED | OUTPATIENT
Start: 2024-12-27 | End: 2024-12-31 | Stop reason: HOSPADM

## 2024-12-27 RX ORDER — GUAIFENESIN 600 MG/1
600 TABLET, EXTENDED RELEASE ORAL EVERY 12 HOURS SCHEDULED
Status: DISCONTINUED | OUTPATIENT
Start: 2024-12-27 | End: 2024-12-31 | Stop reason: HOSPADM

## 2024-12-27 RX ADMIN — GABAPENTIN 300 MG: 300 CAPSULE ORAL at 08:02

## 2024-12-27 RX ADMIN — HEPARIN SODIUM 5000 UNITS: 5000 INJECTION INTRAVENOUS; SUBCUTANEOUS at 05:57

## 2024-12-27 RX ADMIN — GUAIFENESIN 600 MG: 600 TABLET ORAL at 12:09

## 2024-12-27 RX ADMIN — IPRATROPIUM BROMIDE 0.5 MG: 0.5 SOLUTION RESPIRATORY (INHALATION) at 14:03

## 2024-12-27 RX ADMIN — BACLOFEN 5 MG: 10 TABLET ORAL at 17:47

## 2024-12-27 RX ADMIN — CEFEPIME 2000 MG: 2 INJECTION, POWDER, FOR SOLUTION INTRAVENOUS at 14:23

## 2024-12-27 RX ADMIN — ACETAMINOPHEN 650 MG: 325 TABLET, FILM COATED ORAL at 07:35

## 2024-12-27 RX ADMIN — LEVALBUTEROL HYDROCHLORIDE 1.25 MG: 1.25 SOLUTION RESPIRATORY (INHALATION) at 20:36

## 2024-12-27 RX ADMIN — MIRTAZAPINE 7.5 MG: 7.5 TABLET, FILM COATED ORAL at 22:14

## 2024-12-27 RX ADMIN — CEFEPIME 2000 MG: 2 INJECTION, POWDER, FOR SOLUTION INTRAVENOUS at 02:58

## 2024-12-27 RX ADMIN — HEPARIN SODIUM 5000 UNITS: 5000 INJECTION INTRAVENOUS; SUBCUTANEOUS at 22:33

## 2024-12-27 RX ADMIN — METHENAMINE HIPPURATE 1 G: 1000 TABLET ORAL at 07:35

## 2024-12-27 RX ADMIN — Medication 500 MCG: at 08:01

## 2024-12-27 RX ADMIN — PANTOPRAZOLE SODIUM 40 MG: 40 TABLET, DELAYED RELEASE ORAL at 05:57

## 2024-12-27 RX ADMIN — VANCOMYCIN HYDROCHLORIDE 1500 MG: 5 INJECTION, POWDER, LYOPHILIZED, FOR SOLUTION INTRAVENOUS at 15:11

## 2024-12-27 RX ADMIN — CEFEPIME 2000 MG: 2 INJECTION, POWDER, FOR SOLUTION INTRAVENOUS at 22:33

## 2024-12-27 RX ADMIN — SENNOSIDES AND DOCUSATE SODIUM 2 TABLET: 8.6; 5 TABLET ORAL at 22:14

## 2024-12-27 RX ADMIN — PROPRANOLOL HYDROCHLORIDE 60 MG: 60 CAPSULE, EXTENDED RELEASE ORAL at 08:02

## 2024-12-27 RX ADMIN — HEPARIN SODIUM 5000 UNITS: 5000 INJECTION INTRAVENOUS; SUBCUTANEOUS at 14:34

## 2024-12-27 RX ADMIN — CLOPIDOGREL BISULFATE 75 MG: 75 TABLET ORAL at 08:02

## 2024-12-27 RX ADMIN — LEVALBUTEROL HYDROCHLORIDE 1.25 MG: 1.25 SOLUTION RESPIRATORY (INHALATION) at 14:03

## 2024-12-27 RX ADMIN — LATANOPROST 1 DROP: 50 SOLUTION OPHTHALMIC at 22:33

## 2024-12-27 RX ADMIN — IPRATROPIUM BROMIDE 0.5 MG: 0.5 SOLUTION RESPIRATORY (INHALATION) at 20:36

## 2024-12-27 RX ADMIN — GUAIFENESIN 600 MG: 600 TABLET ORAL at 22:33

## 2024-12-27 RX ADMIN — METHENAMINE HIPPURATE 1 G: 1000 TABLET ORAL at 17:46

## 2024-12-27 RX ADMIN — BACLOFEN 5 MG: 10 TABLET ORAL at 22:14

## 2024-12-27 RX ADMIN — BACLOFEN 5 MG: 10 TABLET ORAL at 08:02

## 2024-12-27 RX ADMIN — POLYETHYLENE GLYCOL 3350 17 G: 17 POWDER, FOR SOLUTION ORAL at 17:51

## 2024-12-27 RX ADMIN — ATORVASTATIN CALCIUM 80 MG: 80 TABLET, FILM COATED ORAL at 17:47

## 2024-12-27 RX ADMIN — GABAPENTIN 300 MG: 300 CAPSULE ORAL at 17:46

## 2024-12-27 RX ADMIN — GABAPENTIN 300 MG: 300 CAPSULE ORAL at 22:13

## 2024-12-27 RX ADMIN — POLYETHYLENE GLYCOL 3350 17 G: 17 POWDER, FOR SOLUTION ORAL at 08:01

## 2024-12-27 RX ADMIN — AMLODIPINE BESYLATE 10 MG: 10 TABLET ORAL at 08:02

## 2024-12-27 NOTE — PLAN OF CARE
Problem: PAIN - ADULT  Goal: Verbalizes/displays adequate comfort level or baseline comfort level  Description: Interventions:  - Encourage patient to monitor pain and request assistance  - Assess pain using appropriate pain scale  - Administer analgesics based on type and severity of pain and evaluate response  - Implement non-pharmacological measures as appropriate and evaluate response  - Consider cultural and social influences on pain and pain management  - Notify physician/advanced practitioner if interventions unsuccessful or patient reports new pain  Outcome: Progressing     Problem: INFECTION - ADULT  Goal: Absence or prevention of progression during hospitalization  Description: INTERVENTIONS:  - Assess and monitor for signs and symptoms of infection  - Monitor lab/diagnostic results  - Monitor all insertion sites, i.e. indwelling lines, tubes, and drains  - Monitor endotracheal if appropriate and nasal secretions for changes in amount and color  - Waddy appropriate cooling/warming therapies per order  - Administer medications as ordered  - Instruct and encourage patient and family to use good hand hygiene technique  - Identify and instruct in appropriate isolation precautions for identified infection/condition  Outcome: Progressing  Goal: Absence of fever/infection during neutropenic period  Description: INTERVENTIONS:  - Monitor WBC    Outcome: Progressing     Problem: SAFETY ADULT  Goal: Patient will remain free of falls  Description: INTERVENTIONS:  - Educate patient/family on patient safety including physical limitations  - Instruct patient to call for assistance with activity   - Consult OT/PT to assist with strengthening/mobility   - Keep Call bell within reach  - Keep bed low and locked with side rails adjusted as appropriate  - Keep care items and personal belongings within reach  - Initiate and maintain comfort rounds  - Make Fall Risk Sign visible to staff  - Offer Toileting every 2 Hours,  in advance of need  - Initiate/Maintain bedalarm  - Obtain necessary fall risk management equipment:   - Apply yellow socks and bracelet for high fall risk patients  - Consider moving patient to room near nurses station  Outcome: Progressing  Goal: Maintain or return to baseline ADL function  Description: INTERVENTIONS:  -  Assess patient's ability to carry out ADLs; assess patient's baseline for ADL function and identify physical deficits which impact ability to perform ADLs (bathing, care of mouth/teeth, toileting, grooming, dressing, etc.)  - Assess/evaluate cause of self-care deficits   - Assess range of motion  - Assess patient's mobility; develop plan if impaired  - Assess patient's need for assistive devices and provide as appropriate  - Encourage maximum independence but intervene and supervise when necessary  - Involve family in performance of ADLs  - Assess for home care needs following discharge   - Consider OT consult to assist with ADL evaluation and planning for discharge  - Provide patient education as appropriate  Outcome: Progressing  Goal: Maintains/Returns to pre admission functional level  Description: INTERVENTIONS:  - Perform AM-PAC 6 Click Basic Mobility/ Daily Activity assessment daily.  - Set and communicate daily mobility goal to care team and patient/family/caregiver.   - Collaborate with rehabilitation services on mobility goals if consulted  - Out of bed for toileting  - Record patient progress and toleration of activity level   Outcome: Progressing     Problem: DISCHARGE PLANNING  Goal: Discharge to home or other facility with appropriate resources  Description: INTERVENTIONS:  - Identify barriers to discharge w/patient and caregiver  - Arrange for needed discharge resources and transportation as appropriate  - Identify discharge learning needs (meds, wound care, etc.)  - Arrange for interpretive services to assist at discharge as needed  - Refer to Case Management Department for  coordinating discharge planning if the patient needs post-hospital services based on physician/advanced practitioner order or complex needs related to functional status, cognitive ability, or social support system  Outcome: Progressing     Problem: Knowledge Deficit  Goal: Patient/family/caregiver demonstrates understanding of disease process, treatment plan, medications, and discharge instructions  Description: Complete learning assessment and assess knowledge base.  Interventions:  - Provide teaching at level of understanding  - Provide teaching via preferred learning methods  Outcome: Progressing     Problem: Prexisting or High Potential for Compromised Skin Integrity  Goal: Skin integrity is maintained or improved  Description: INTERVENTIONS:  - Identify patients at risk for skin breakdown  - Assess and monitor skin integrity  - Assess and monitor nutrition and hydration status  - Monitor labs   - Assess for incontinence   - Turn and reposition patient  - Assist with mobility/ambulation  - Relieve pressure over bony prominences  - Avoid friction and shearing  - Provide appropriate hygiene as needed including keeping skin clean and dry  - Evaluate need for skin moisturizer/barrier cream  - Collaborate with interdisciplinary team   - Patient/family teaching  - Consider wound care consult   Outcome: Progressing

## 2024-12-27 NOTE — PROGRESS NOTES
Mathew Rico is a 74 y.o. male who is currently ordered Vancomycin IV with management by the Pharmacy Consult service.  Relevant clinical data and objective / subjective history reviewed.  Vancomycin Assessment:  Indication and Goal AUC/Trough: Pneumonia (goal -600, trough >10)  Clinical Status:  new  Micro:   pending  Renal Function:  SCr: 0.76 mg/dL  CrCl: 73.1 mL/min  Renal replacement: Not on dialysis  Days of Therapy: 1  Current Dose: 1500 mg IV Loading dose  Vancomycin Plan:  New Dosin mg IV q 12 hrs  Estimated AUC: 430 mcg*hr/mL  Estimated Trough: 11.8 mcg/mL  Next Level: 24 with am labs  Renal Function Monitoring: Daily BMP and UOP  Pharmacy will continue to follow closely for s/sx of nephrotoxicity, infusion reactions and appropriateness of therapy.  BMP and CBC will be ordered per protocol. We will continue to follow the patient’s culture results and clinical progress daily.    Viky Valdez, Pharmacist

## 2024-12-27 NOTE — UTILIZATION REVIEW
Notification of Unplanned, Urgent, or   Emergency Inpatient Admission   AUTHORIZATION REQUEST   Admitting Facility [place] [address]  Tax ID: [id number]  NPI: [id number]  Place of Service: Acute [facility]  Admission Level of Care: Inpatient  Place of Service Code: 21     Attending Physician Information  Attending Name and NPI#: Diana Do Alan [[id number]]  Phone: [phone]     Admission Information  Inpatient Admission Date/Time: 12/25/24  2:17 PM  Discharge Date/Time: No discharge date for patient encounter.  Admitting Diagnosis Code/Description:  UTI (urinary tract infection) [N39.0]  History of stroke [Z86.73]  Severe sepsis (HCC) [A41.9, R65.20]     Utilization Review Contact  Marsha Garcia Utilization   Phone: [phone]  Fax: [phone]  Email: [email or web address]  Contact for approvals/pending authorizations, clinical reviews, and discharge.     Physician Advisory Services Contact  Medical Necessity Denial & Jdqm-ab-Flxa Discussion  Phone: [phone]  Fax: [phone]  Email: [email or web address]     DISCHARGE SUPPORT TEAM:  For Patients Discharge Needs & Updates  Phone: [phone] opt. 2 Fax: [phone]  Email: [email or web address]

## 2024-12-27 NOTE — ASSESSMENT & PLAN NOTE
Lab Results   Component Value Date    HGBA1C 9.0 (H) 10/06/2024       Recent Labs     12/26/24  1754 12/26/24  2106 12/27/24  0732 12/27/24  1121   POCGLU 115 108 94 112       Blood Sugar Average: Last 72 hrs:  (P) 112.5462584583415625  Resume home regimen at discharge

## 2024-12-27 NOTE — ASSESSMENT & PLAN NOTE
74-year-old male with PMH of MS, paraplegia, neurogenic bladder with SPT and HTN presented with fevers  Met sepsis criteria with fevers, tachycardia with urinary source  SPT tube change on 12/25 in the ED  Continue antibiotics with cefepime, previous cultures grew Enterobacter  Blood cultures negative for 24 hours  Urine culture showed mixed contaminants.   Has worsening cough with sputum  Check ct chest  Broaden antibiotics until results of mrsa culture and ct chest   Check urine for legionella and strep  Check sputum culture  Covid/fl/rsv negative on the 25th of December. Consider repeat tomorrow if ct negative   Add bronchodilators  Add cough medications

## 2024-12-27 NOTE — PLAN OF CARE
Problem: PAIN - ADULT  Goal: Verbalizes/displays adequate comfort level or baseline comfort level  Description: Interventions:  - Encourage patient to monitor pain and request assistance  - Assess pain using appropriate pain scale  - Administer analgesics based on type and severity of pain and evaluate response  - Implement non-pharmacological measures as appropriate and evaluate response  - Consider cultural and social influences on pain and pain management  - Notify physician/advanced practitioner if interventions unsuccessful or patient reports new pain  Outcome: Progressing     Problem: INFECTION - ADULT  Goal: Absence or prevention of progression during hospitalization  Description: INTERVENTIONS:  - Assess and monitor for signs and symptoms of infection  - Monitor lab/diagnostic results  - Monitor all insertion sites, i.e. indwelling lines, tubes, and drains  - Monitor endotracheal if appropriate and nasal secretions for changes in amount and color  - [place] appropriate cooling/warming therapies per order  - Administer medications as ordered  - Instruct and encourage patient and family to use good hand hygiene technique  - Identify and instruct in appropriate isolation precautions for identified infection/condition  Outcome: Progressing  Goal: Absence of fever/infection during neutropenic period  Description: INTERVENTIONS:  - Monitor WBC    Outcome: Progressing     Problem: SAFETY ADULT  Goal: Patient will remain free of falls  Description: INTERVENTIONS:  - Educate patient/family on patient safety including physical limitations  - Instruct patient to call for assistance with activity   - Consult OT/PT to assist with strengthening/mobility   - Keep Call bell within reach  - Keep bed low and locked with side rails adjusted as appropriate  - Keep care items and personal belongings within reach  - Initiate and maintain comfort rounds  - Make Fall Risk Sign visible to staff  - Offer Toileting every 2 Hours,  in advance of need  - Initiate/Maintain bedalarm  - Obtain necessary fall risk management equipment:   - Apply yellow socks and bracelet for high fall risk patients  - Consider moving patient to room near nurses station  Outcome: Progressing  Goal: Maintain or return to baseline ADL function  Description: INTERVENTIONS:  -  Assess patient's ability to carry out ADLs; assess patient's baseline for ADL function and identify physical deficits which impact ability to perform ADLs (bathing, care of mouth/teeth, toileting, grooming, dressing, etc.)  - Assess/evaluate cause of self-care deficits   - Assess range of motion  - Assess patient's mobility; develop plan if impaired  - Assess patient's need for assistive devices and provide as appropriate  - Encourage maximum independence but intervene and supervise when necessary  - Involve family in performance of ADLs  - Assess for home care needs following discharge   - Consider OT consult to assist with ADL evaluation and planning for discharge  - Provide patient education as appropriate  Outcome: Progressing  Goal: Maintains/Returns to pre admission functional level  Description: INTERVENTIONS:  - Perform AM-PAC 6 Click Basic Mobility/ Daily Activity assessment daily.  - Set and communicate daily mobility goal to care team and patient/family/caregiver.   - Collaborate with rehabilitation services on mobility goals if consulted  - Out of bed for toileting  - Record patient progress and toleration of activity level   Outcome: Progressing     Problem: DISCHARGE PLANNING  Goal: Discharge to home or other facility with appropriate resources  Description: INTERVENTIONS:  - Identify barriers to discharge w/patient and caregiver  - Arrange for needed discharge resources and transportation as appropriate  - Identify discharge learning needs (meds, wound care, etc.)  - Arrange for interpretive services to assist at discharge as needed  - Refer to Case Management Department for  coordinating discharge planning if the patient needs post-hospital services based on physician/advanced practitioner order or complex needs related to functional status, cognitive ability, or social support system  Outcome: Progressing     Problem: Knowledge Deficit  Goal: Patient/family/caregiver demonstrates understanding of disease process, treatment plan, medications, and discharge instructions  Description: Complete learning assessment and assess knowledge base.  Interventions:  - Provide teaching at level of understanding  - Provide teaching via preferred learning methods  Outcome: Progressing     Problem: Prexisting or High Potential for Compromised Skin Integrity  Goal: Skin integrity is maintained or improved  Description: INTERVENTIONS:  - Identify patients at risk for skin breakdown  - Assess and monitor skin integrity  - Assess and monitor nutrition and hydration status  - Monitor labs   - Assess for incontinence   - Turn and reposition patient  - Assist with mobility/ambulation  - Relieve pressure over bony prominences  - Avoid friction and shearing  - Provide appropriate hygiene as needed including keeping skin clean and dry  - Evaluate need for skin moisturizer/barrier cream  - Collaborate with interdisciplinary team   - Patient/family teaching  - Consider wound care consult   Outcome: Progressing

## 2024-12-27 NOTE — PROGRESS NOTES
Progress Note - Hospitalist   Name: Mathew Rico 74 y.o. male I MRN: [id number]  Unit/Bed#: E5 -01 I Date of Admission: 12/25/2024   Date of Service: 12/27/2024 I Hospital Day: 2    Assessment & Plan  Sepsis (HCC)  74-year-old male with PMH of MS, paraplegia, neurogenic bladder with SPT and HTN presented with fevers  Met sepsis criteria with fevers, tachycardia with urinary source  SPT tube change on 12/25 in the ED  Continue antibiotics with cefepime, previous cultures grew Enterobacter  Blood cultures negative for 24 hours  Urine culture showed mixed contaminants.   Has worsening cough with sputum  Check ct chest  Broaden antibiotics until results of mrsa culture and ct chest   Check urine for legionella and strep  Check sputum culture  Covid/fl/rsv negative on the 25th of December. Consider repeat tomorrow if ct negative   Add bronchodilators  Add cough medications   Neurogenic bladder  Patient has chronic suprapubic catheter, changed during this admission in the ED.  Appreciate urology input  Follow outpatient with urology, has SPT tube changes every 28 days  Primary hypertension  Continue amlodipine, and propranolol with BP hold parameters  Blood pressure currently controlled  Type 2 diabetes mellitus with neuropathy ([facility])  Lab Results   Component Value Date    HGBA1C 9.0 (H) 10/06/2024       Recent Labs     12/26/24  1754 12/26/24  2106 12/27/24  0732 12/27/24  1121   POCGLU 115 108 94 112       Blood Sugar Average: Last 72 hrs:  (P) 112.8516871870107263  Patient on metformin and Jardiance currently on hold  Monitor on diabetic diet, sliding scale and Accu-Cheks  Stercoral colitis  Patient has issues with chronic constipation and had multiple admissions in the past with severe abdominal pain due to sterile coral colitis and constipation.    Continue bowel regimen with MiraLAX and Senokot  Urinary tract infection associated with cystostomy catheter  (HCC)  As above under sepsis  Multiple sclerosis  (HCC)  Patient has chronic urinary retention status post suprapubic catheter.  Continue follow-up with neurology as previously recommended  History of stroke  Continue Plavix and statin    VTE Pharmacologic Prophylaxis:   heparin     Mobility:   Basic Mobility Inpatient Raw Score: 12  -HL Goal: 4: Move to chair/commode  -HLM Achieved: 4: Move to chair/commode    Patient Centered Rounds:  discussed with his nurse       Education and Discussions with Family / Patient: attempted to call his brother but no answer     Current Length of Stay: 2 day(s)  Current Patient Status: Inpatient     Discharge Plan: Anticipate discharge in 48-72 hrs to home.    Code Status: Level 1 - Full Code    Subjective   Asked to see pt by his nurse as he was lethargic and had a lot of green sputum this am. Pt answered quickly to his name. Opened his eyes and answered all questions. He said he has been coughing a lot and didn't sleep well last night. He had two coughing spells while in the room with him.     Objective :  Temp:  [98.2 °F (36.8 °C)-102.3 °F (39.1 °C)] 101.3 °F (38.5 °C)  HR:  [] 94  BP: (134-165)/(60-99) 165/84  Resp:  [16-19] 16  SpO2:  [94 %-98 %] 95 %  O2 Device: None (Room air)    Body mass index is 23.04 kg/m².     Input and Output Summary (last 24 hours):     Intake/Output Summary (Last 24 hours) at 12/27/2024 1203  Last data filed at 12/27/2024 0601  Gross per 24 hour   Intake 120 ml   Output 1900 ml   Net -1780 ml       Physical Exam  Constitutional:       Appearance: He is ill-appearing.   HENT:      Head: Normocephalic and atraumatic.   Eyes:      Extraocular Movements: Extraocular movements intact.      Pupils: Pupils are equal, round, and reactive to light.   Cardiovascular:      Rate and Rhythm: Normal rate and regular rhythm.      Heart sounds: No murmur heard.     No friction rub. No gallop.   Pulmonary:      Effort: Pulmonary effort is normal. No respiratory distress.      Breath sounds: No wheezing,  rhonchi or rales.      Comments: Coarse breath sounds throughout   Abdominal:      General: Bowel sounds are normal. There is no distension.      Palpations: Abdomen is soft.      Tenderness: There is no abdominal tenderness. There is no guarding or rebound.   Musculoskeletal:      Right lower leg: No edema.      Left lower leg: No edema.   Neurological:      Mental Status: He is alert and oriented to person, place, and time.           Lines/Drains:  Lines/Drains/Airways       Active Status       Name Placement date Placement time Site Days    Suprapubic Catheter 20 Fr. 12/10/24  1030  --  17                    Lab Results: I have reviewed the following results:   Results from last 7 days   Lab Units 12/26/24  0439   WBC Thousand/uL 8.71   HEMOGLOBIN g/dL 12.5   HEMATOCRIT % 38.5   PLATELETS Thousands/uL 257   SEGS PCT % 75   LYMPHO PCT % 10*   MONO PCT % 11   EOS PCT % 3     Results from last 7 days   Lab Units 12/26/24  0439 12/25/24  1133   SODIUM mmol/L 133* 135   POTASSIUM mmol/L 3.5 3.9   CHLORIDE mmol/L 105 99   CO2 mmol/L 20* 25   BUN mg/dL 12 9   CREATININE mg/dL 0.76 0.95   ANION GAP mmol/L 8 11   CALCIUM mg/dL 8.6 9.9   ALBUMIN g/dL  --  4.0   TOTAL BILIRUBIN mg/dL  --  0.49   ALK PHOS U/L  --  94   ALT U/L  --  10   AST U/L  --  13   GLUCOSE RANDOM mg/dL 127 106     Results from last 7 days   Lab Units 12/25/24  1133   INR  0.94     Results from last 7 days   Lab Units 12/27/24  1121 12/27/24  0732 12/26/24  2106 12/26/24  1754 12/26/24  1539 12/26/24  1136 12/26/24  0748 12/25/24  2119 12/25/24  1718   POC GLUCOSE mg/dl 112 94 108 115 144* 115 110 113 102         Results from last 7 days   Lab Units 12/26/24  0439 12/25/24  1452 12/25/24  1133   LACTIC ACID mmol/L  --  1.4 2.9*   PROCALCITONIN ng/ml 0.10  --  0.10       Recent Cultures (last 7 days):   Results from last 7 days   Lab Units 12/25/24  1352 12/25/24  1142 12/25/24  1133   BLOOD CULTURE   --  No Growth at 24 hrs. No Growth at 24 hrs.   URINE  CULTURE  >100,000 cfu/ml  --   --      Ct abd/pelvis   Other Study Results Review: No additional pertinent studies reviewed.    Last 24 Hours Medication List:     Current Facility-Administered Medications:     acetaminophen (TYLENOL) tablet 650 mg, Q6H PRN    amLODIPine (NORVASC) tablet 10 mg, Daily **AND** atorvastatin (LIPITOR) tablet 80 mg, Daily With Dinner    baclofen tablet 5 mg, TID    bisacodyl (DULCOLAX) rectal suppository 10 mg, Daily PRN    budesonide-formoterol (SYMBICORT) 160-4.5 mcg/act inhaler 2 puff, BID    ceFEPime (MAXIPIME) 2,000 mg in dextrose 5 % 50 mL IVPB, Q8H    clopidogrel (PLAVIX) tablet 75 mg, Daily    cromolyn (NASALCHROM) nasal spray 1 spray, TID    cyanocobalamin (VITAMIN B-12) tablet 500 mcg, Daily    gabapentin (NEURONTIN) capsule 300 mg, TID    [Held by provider] gabapentin (NEURONTIN) capsule 600 mg, HS    guaiFENesin (MUCINEX) 12 hr tablet 600 mg, Q12H CIERA    guaiFENesin (ROBITUSSIN) oral liquid 200 mg, Q4H PRN    heparin (porcine) subcutaneous injection 5,000 Units, Q8H CIERA **AND** [COMPLETED] Platelet count, Once    insulin lispro (HumALOG/ADMELOG) 100 units/mL subcutaneous injection 1-5 Units, 4x Daily (PC & HS) **AND** Fingerstick Glucose (POCT), 4x Daily AC and at bedtime    ipratropium (ATROVENT) 0.02 % inhalation solution 0.5 mg, TID    latanoprost (XALATAN) 0.005 % ophthalmic solution 1 drop, HS    levalbuterol (XOPENEX) inhalation solution 1.25 mg, TID    meclizine (ANTIVERT) tablet 12.5 mg, Q8H PRN    melatonin tablet 3 mg, HS PRN    methenamine hippurate (HIPREX) tablet 1 g, BID With Meals    mirtazapine (REMERON) tablet 7.5 mg, HS    pantoprazole (PROTONIX) EC tablet 40 mg, Early Morning    polyethylene glycol (MIRALAX) packet 17 g, BID    propranolol (INDERAL LA) 24 hr capsule 60 mg, Daily    senna-docusate sodium (SENOKOT S) 8.6-50 mg per tablet 2 tablet, HS    vancomycin (VANCOCIN) IVPB (premix in dextrose) 1,000 mg 200 mL, Q12H    Administrative Statements   Today,  Patient Was Seen By: Diana Phillips DO

## 2024-12-27 NOTE — ASSESSMENT & PLAN NOTE
Lab Results   Component Value Date    HGBA1C 9.0 (H) 10/06/2024       Recent Labs     12/26/24  1754 12/26/24  2106 12/27/24  0732 12/27/24  1121   POCGLU 115 108 94 112       Blood Sugar Average: Last 72 hrs:  (P) 112.2502702409622433  Patient on metformin and Jardiance currently on hold  Monitor on diabetic diet, sliding scale and Accu-Cheks

## 2024-12-28 LAB
ANION GAP SERPL CALCULATED.3IONS-SCNC: 9 MMOL/L (ref 4–13)
B PARAP IS1001 DNA NPH QL NAA+NON-PROBE: NOT DETECTED
B PERT.PT PRMT NPH QL NAA+NON-PROBE: NOT DETECTED
BUN SERPL-MCNC: 10 MG/DL (ref 5–25)
C PNEUM DNA NPH QL NAA+NON-PROBE: NOT DETECTED
CALCIUM SERPL-MCNC: 9 MG/DL (ref 8.4–10.2)
CHLORIDE SERPL-SCNC: 103 MMOL/L (ref 96–108)
CO2 SERPL-SCNC: 20 MMOL/L (ref 21–32)
CREAT SERPL-MCNC: 0.75 MG/DL (ref 0.6–1.3)
ERYTHROCYTE [DISTWIDTH] IN BLOOD BY AUTOMATED COUNT: 14.9 % (ref 11.6–15.1)
FLUAV H3 RNA NPH QL NAA+NON-PROBE: DETECTED
FLUBV RNA NPH QL NAA+NON-PROBE: NOT DETECTED
GFR SERPL CREATININE-BSD FRML MDRD: 89 ML/MIN/1.73SQ M
GLUCOSE SERPL-MCNC: 127 MG/DL (ref 65–140)
GLUCOSE SERPL-MCNC: 128 MG/DL (ref 65–140)
GLUCOSE SERPL-MCNC: 128 MG/DL (ref 65–140)
GLUCOSE SERPL-MCNC: 154 MG/DL (ref 65–140)
GLUCOSE SERPL-MCNC: 155 MG/DL (ref 65–140)
HADV DNA NPH QL NAA+NON-PROBE: NOT DETECTED
HCOV 229E RNA NPH QL NAA+NON-PROBE: NOT DETECTED
HCOV HKU1 RNA NPH QL NAA+NON-PROBE: NOT DETECTED
HCOV NL63 RNA NPH QL NAA+NON-PROBE: NOT DETECTED
HCOV OC43 RNA NPH QL NAA+NON-PROBE: NOT DETECTED
HCT VFR BLD AUTO: 40.4 % (ref 36.5–49.3)
HGB BLD-MCNC: 13 G/DL (ref 12–17)
HMPV RNA NPH QL NAA+NON-PROBE: NOT DETECTED
HPIV1 RNA NPH QL NAA+NON-PROBE: NOT DETECTED
HPIV2 RNA NPH QL NAA+NON-PROBE: NOT DETECTED
HPIV3 RNA NPH QL NAA+NON-PROBE: NOT DETECTED
HPIV4 RNA NPH QL NAA+NON-PROBE: NOT DETECTED
M PNEUMO DNA NPH QL NAA+NON-PROBE: NOT DETECTED
MCH RBC QN AUTO: 26.7 PG (ref 26.8–34.3)
MCHC RBC AUTO-ENTMCNC: 32.2 G/DL (ref 31.4–37.4)
MCV RBC AUTO: 83 FL (ref 82–98)
MRSA NOSE QL CULT: NORMAL
PLATELET # BLD AUTO: 245 THOUSANDS/UL (ref 149–390)
PMV BLD AUTO: 8.5 FL (ref 8.9–12.7)
POTASSIUM SERPL-SCNC: 3.4 MMOL/L (ref 3.5–5.3)
PROCALCITONIN SERPL-MCNC: 0.1 NG/ML
RBC # BLD AUTO: 4.86 MILLION/UL (ref 3.88–5.62)
RSV RNA NPH QL NAA+NON-PROBE: NOT DETECTED
RV+EV RNA NPH QL NAA+NON-PROBE: NOT DETECTED
SARS-COV-2 RNA NPH QL NAA+NON-PROBE: NOT DETECTED
SODIUM SERPL-SCNC: 132 MMOL/L (ref 135–147)
WBC # BLD AUTO: 6.07 THOUSAND/UL (ref 4.31–10.16)

## 2024-12-28 PROCEDURE — 82948 REAGENT STRIP/BLOOD GLUCOSE: CPT

## 2024-12-28 PROCEDURE — 84145 PROCALCITONIN (PCT): CPT | Performed by: INTERNAL MEDICINE

## 2024-12-28 PROCEDURE — 0202U NFCT DS 22 TRGT SARS-COV-2: CPT | Performed by: INTERNAL MEDICINE

## 2024-12-28 PROCEDURE — 94760 N-INVAS EAR/PLS OXIMETRY 1: CPT

## 2024-12-28 PROCEDURE — 85027 COMPLETE CBC AUTOMATED: CPT | Performed by: INTERNAL MEDICINE

## 2024-12-28 PROCEDURE — 94640 AIRWAY INHALATION TREATMENT: CPT

## 2024-12-28 PROCEDURE — 99232 SBSQ HOSP IP/OBS MODERATE 35: CPT | Performed by: INTERNAL MEDICINE

## 2024-12-28 PROCEDURE — 80048 BASIC METABOLIC PNL TOTAL CA: CPT | Performed by: INTERNAL MEDICINE

## 2024-12-28 RX ORDER — POTASSIUM CHLORIDE 1500 MG/1
40 TABLET, EXTENDED RELEASE ORAL ONCE
Status: COMPLETED | OUTPATIENT
Start: 2024-12-28 | End: 2024-12-28

## 2024-12-28 RX ADMIN — IPRATROPIUM BROMIDE 0.5 MG: 0.5 SOLUTION RESPIRATORY (INHALATION) at 19:09

## 2024-12-28 RX ADMIN — VANCOMYCIN HYDROCHLORIDE 750 MG: 750 INJECTION, SOLUTION INTRAVENOUS at 15:33

## 2024-12-28 RX ADMIN — LEVALBUTEROL HYDROCHLORIDE 1.25 MG: 1.25 SOLUTION RESPIRATORY (INHALATION) at 13:33

## 2024-12-28 RX ADMIN — POLYETHYLENE GLYCOL 3350 17 G: 17 POWDER, FOR SOLUTION ORAL at 09:33

## 2024-12-28 RX ADMIN — CLOPIDOGREL BISULFATE 75 MG: 75 TABLET ORAL at 09:30

## 2024-12-28 RX ADMIN — MIRTAZAPINE 7.5 MG: 7.5 TABLET, FILM COATED ORAL at 22:02

## 2024-12-28 RX ADMIN — INSULIN LISPRO 1 UNITS: 100 INJECTION, SOLUTION INTRAVENOUS; SUBCUTANEOUS at 22:01

## 2024-12-28 RX ADMIN — HEPARIN SODIUM 5000 UNITS: 5000 INJECTION INTRAVENOUS; SUBCUTANEOUS at 22:02

## 2024-12-28 RX ADMIN — GABAPENTIN 300 MG: 300 CAPSULE ORAL at 22:07

## 2024-12-28 RX ADMIN — GABAPENTIN 300 MG: 300 CAPSULE ORAL at 15:42

## 2024-12-28 RX ADMIN — METHENAMINE HIPPURATE 1 G: 1000 TABLET ORAL at 09:29

## 2024-12-28 RX ADMIN — INSULIN LISPRO 1 UNITS: 100 INJECTION, SOLUTION INTRAVENOUS; SUBCUTANEOUS at 11:32

## 2024-12-28 RX ADMIN — Medication 500 MCG: at 09:30

## 2024-12-28 RX ADMIN — LATANOPROST 1 DROP: 50 SOLUTION OPHTHALMIC at 22:02

## 2024-12-28 RX ADMIN — CEFEPIME 2000 MG: 2 INJECTION, POWDER, FOR SOLUTION INTRAVENOUS at 22:01

## 2024-12-28 RX ADMIN — HEPARIN SODIUM 5000 UNITS: 5000 INJECTION INTRAVENOUS; SUBCUTANEOUS at 06:14

## 2024-12-28 RX ADMIN — BACLOFEN 5 MG: 10 TABLET ORAL at 22:07

## 2024-12-28 RX ADMIN — GUAIFENESIN 600 MG: 600 TABLET ORAL at 09:30

## 2024-12-28 RX ADMIN — IPRATROPIUM BROMIDE 0.5 MG: 0.5 SOLUTION RESPIRATORY (INHALATION) at 07:09

## 2024-12-28 RX ADMIN — GUAIFENESIN 600 MG: 600 TABLET ORAL at 22:01

## 2024-12-28 RX ADMIN — VANCOMYCIN HYDROCHLORIDE 750 MG: 750 INJECTION, SOLUTION INTRAVENOUS at 03:04

## 2024-12-28 RX ADMIN — POLYETHYLENE GLYCOL 3350 17 G: 17 POWDER, FOR SOLUTION ORAL at 15:39

## 2024-12-28 RX ADMIN — HEPARIN SODIUM 5000 UNITS: 5000 INJECTION INTRAVENOUS; SUBCUTANEOUS at 13:56

## 2024-12-28 RX ADMIN — BACLOFEN 5 MG: 10 TABLET ORAL at 15:39

## 2024-12-28 RX ADMIN — ATORVASTATIN CALCIUM 80 MG: 80 TABLET, FILM COATED ORAL at 15:39

## 2024-12-28 RX ADMIN — PANTOPRAZOLE SODIUM 40 MG: 40 TABLET, DELAYED RELEASE ORAL at 06:15

## 2024-12-28 RX ADMIN — IPRATROPIUM BROMIDE 0.5 MG: 0.5 SOLUTION RESPIRATORY (INHALATION) at 13:33

## 2024-12-28 RX ADMIN — GABAPENTIN 300 MG: 300 CAPSULE ORAL at 09:30

## 2024-12-28 RX ADMIN — BUDESONIDE AND FORMOTEROL FUMARATE DIHYDRATE 2 PUFF: 160; 4.5 AEROSOL RESPIRATORY (INHALATION) at 15:39

## 2024-12-28 RX ADMIN — CEFEPIME 2000 MG: 2 INJECTION, POWDER, FOR SOLUTION INTRAVENOUS at 06:15

## 2024-12-28 RX ADMIN — POTASSIUM CHLORIDE 40 MEQ: 1500 TABLET, EXTENDED RELEASE ORAL at 09:30

## 2024-12-28 RX ADMIN — LEVALBUTEROL HYDROCHLORIDE 1.25 MG: 1.25 SOLUTION RESPIRATORY (INHALATION) at 19:09

## 2024-12-28 RX ADMIN — LEVALBUTEROL HYDROCHLORIDE 1.25 MG: 1.25 SOLUTION RESPIRATORY (INHALATION) at 07:09

## 2024-12-28 RX ADMIN — SENNOSIDES AND DOCUSATE SODIUM 2 TABLET: 8.6; 5 TABLET ORAL at 22:01

## 2024-12-28 RX ADMIN — BACLOFEN 5 MG: 10 TABLET ORAL at 09:30

## 2024-12-28 RX ADMIN — CEFEPIME 2000 MG: 2 INJECTION, POWDER, FOR SOLUTION INTRAVENOUS at 13:56

## 2024-12-28 RX ADMIN — METHENAMINE HIPPURATE 1 G: 1000 TABLET ORAL at 15:39

## 2024-12-28 NOTE — PLAN OF CARE
Problem: PAIN - ADULT  Goal: Verbalizes/displays adequate comfort level or baseline comfort level  Description: Interventions:  - Encourage patient to monitor pain and request assistance  - Assess pain using appropriate pain scale  - Administer analgesics based on type and severity of pain and evaluate response  - Implement non-pharmacological measures as appropriate and evaluate response  - Consider cultural and social influences on pain and pain management  - Notify physician/advanced practitioner if interventions unsuccessful or patient reports new pain  Outcome: Progressing     Problem: INFECTION - ADULT  Goal: Absence or prevention of progression during hospitalization  Description: INTERVENTIONS:  - Assess and monitor for signs and symptoms of infection  - Monitor lab/diagnostic results  - Monitor all insertion sites, i.e. indwelling lines, tubes, and drains  - Monitor endotracheal if appropriate and nasal secretions for changes in amount and color  - [place] appropriate cooling/warming therapies per order  - Administer medications as ordered  - Instruct and encourage patient and family to use good hand hygiene technique  - Identify and instruct in appropriate isolation precautions for identified infection/condition  Outcome: Progressing  Goal: Absence of fever/infection during neutropenic period  Description: INTERVENTIONS:  - Monitor WBC    Outcome: Progressing     Problem: SAFETY ADULT  Goal: Patient will remain free of falls  Description: INTERVENTIONS:  - Educate patient/family on patient safety including physical limitations  - Instruct patient to call for assistance with activity   - Consult OT/PT to assist with strengthening/mobility   - Keep Call bell within reach  - Keep bed low and locked with side rails adjusted as appropriate  - Keep care items and personal belongings within reach  - Initiate and maintain comfort rounds  - Make Fall Risk Sign visible to staff  - Offer Toileting every 2 Hours,  in advance of need  - Initiate/Maintain bedalarm  - Obtain necessary fall risk management equipment:   - Apply yellow socks and bracelet for high fall risk patients  - Consider moving patient to room near nurses station  Outcome: Progressing  Goal: Maintain or return to baseline ADL function  Description: INTERVENTIONS:  -  Assess patient's ability to carry out ADLs; assess patient's baseline for ADL function and identify physical deficits which impact ability to perform ADLs (bathing, care of mouth/teeth, toileting, grooming, dressing, etc.)  - Assess/evaluate cause of self-care deficits   - Assess range of motion  - Assess patient's mobility; develop plan if impaired  - Assess patient's need for assistive devices and provide as appropriate  - Encourage maximum independence but intervene and supervise when necessary  - Involve family in performance of ADLs  - Assess for home care needs following discharge   - Consider OT consult to assist with ADL evaluation and planning for discharge  - Provide patient education as appropriate  Outcome: Progressing  Goal: Maintains/Returns to pre admission functional level  Description: INTERVENTIONS:  - Perform AM-PAC 6 Click Basic Mobility/ Daily Activity assessment daily.  - Set and communicate daily mobility goal to care team and patient/family/caregiver.   - Collaborate with rehabilitation services on mobility goals if consulted  - Out of bed for toileting  - Record patient progress and toleration of activity level   Outcome: Progressing     Problem: DISCHARGE PLANNING  Goal: Discharge to home or other facility with appropriate resources  Description: INTERVENTIONS:  - Identify barriers to discharge w/patient and caregiver  - Arrange for needed discharge resources and transportation as appropriate  - Identify discharge learning needs (meds, wound care, etc.)  - Arrange for interpretive services to assist at discharge as needed  - Refer to Case Management Department for  coordinating discharge planning if the patient needs post-hospital services based on physician/advanced practitioner order or complex needs related to functional status, cognitive ability, or social support system  Outcome: Progressing     Problem: Knowledge Deficit  Goal: Patient/family/caregiver demonstrates understanding of disease process, treatment plan, medications, and discharge instructions  Description: Complete learning assessment and assess knowledge base.  Interventions:  - Provide teaching at level of understanding  - Provide teaching via preferred learning methods  Outcome: Progressing     Problem: Prexisting or High Potential for Compromised Skin Integrity  Goal: Skin integrity is maintained or improved  Description: INTERVENTIONS:  - Identify patients at risk for skin breakdown  - Assess and monitor skin integrity  - Assess and monitor nutrition and hydration status  - Monitor labs   - Assess for incontinence   - Turn and reposition patient  - Assist with mobility/ambulation  - Relieve pressure over bony prominences  - Avoid friction and shearing  - Provide appropriate hygiene as needed including keeping skin clean and dry  - Evaluate need for skin moisturizer/barrier cream  - Collaborate with interdisciplinary team   - Patient/family teaching  - Consider wound care consult   Outcome: Progressing

## 2024-12-28 NOTE — PLAN OF CARE
Problem: PAIN - ADULT  Goal: Verbalizes/displays adequate comfort level or baseline comfort level  Description: Interventions:  - Encourage patient to monitor pain and request assistance  - Assess pain using appropriate pain scale  - Administer analgesics based on type and severity of pain and evaluate response  - Implement non-pharmacological measures as appropriate and evaluate response  - Consider cultural and social influences on pain and pain management  - Notify physician/advanced practitioner if interventions unsuccessful or patient reports new pain  Outcome: Progressing     Problem: INFECTION - ADULT  Goal: Absence or prevention of progression during hospitalization  Description: INTERVENTIONS:  - Assess and monitor for signs and symptoms of infection  - Monitor lab/diagnostic results  - Monitor all insertion sites, i.e. indwelling lines, tubes, and drains  - Monitor endotracheal if appropriate and nasal secretions for changes in amount and color  - [place] appropriate cooling/warming therapies per order  - Administer medications as ordered  - Instruct and encourage patient and family to use good hand hygiene technique  - Identify and instruct in appropriate isolation precautions for identified infection/condition  Outcome: Progressing  Goal: Absence of fever/infection during neutropenic period  Description: INTERVENTIONS:  - Monitor WBC    Outcome: Progressing     Problem: SAFETY ADULT  Goal: Patient will remain free of falls  Description: INTERVENTIONS:  - Educate patient/family on patient safety including physical limitations  - Instruct patient to call for assistance with activity   - Consult OT/PT to assist with strengthening/mobility   - Keep Call bell within reach  - Keep bed low and locked with side rails adjusted as appropriate  - Keep care items and personal belongings within reach  - Initiate and maintain comfort rounds  - Make Fall Risk Sign visible to staff  - Offer Toileting every 2 Hours,  in advance of need  - Initiate/Maintain bedalarm  - Obtain necessary fall risk management equipment:   - Apply yellow socks and bracelet for high fall risk patients  - Consider moving patient to room near nurses station  Outcome: Progressing  Goal: Maintain or return to baseline ADL function  Description: INTERVENTIONS:  -  Assess patient's ability to carry out ADLs; assess patient's baseline for ADL function and identify physical deficits which impact ability to perform ADLs (bathing, care of mouth/teeth, toileting, grooming, dressing, etc.)  - Assess/evaluate cause of self-care deficits   - Assess range of motion  - Assess patient's mobility; develop plan if impaired  - Assess patient's need for assistive devices and provide as appropriate  - Encourage maximum independence but intervene and supervise when necessary  - Involve family in performance of ADLs  - Assess for home care needs following discharge   - Consider OT consult to assist with ADL evaluation and planning for discharge  - Provide patient education as appropriate  Outcome: Progressing  Goal: Maintains/Returns to pre admission functional level  Description: INTERVENTIONS:  - Perform AM-PAC 6 Click Basic Mobility/ Daily Activity assessment daily.  - Set and communicate daily mobility goal to care team and patient/family/caregiver.   - Collaborate with rehabilitation services on mobility goals if consulted  - Out of bed for toileting  - Record patient progress and toleration of activity level   Outcome: Progressing     Problem: DISCHARGE PLANNING  Goal: Discharge to home or other facility with appropriate resources  Description: INTERVENTIONS:  - Identify barriers to discharge w/patient and caregiver  - Arrange for needed discharge resources and transportation as appropriate  - Identify discharge learning needs (meds, wound care, etc.)  - Arrange for interpretive services to assist at discharge as needed  - Refer to Case Management Department for  coordinating discharge planning if the patient needs post-hospital services based on physician/advanced practitioner order or complex needs related to functional status, cognitive ability, or social support system  Outcome: Progressing     Problem: Knowledge Deficit  Goal: Patient/family/caregiver demonstrates understanding of disease process, treatment plan, medications, and discharge instructions  Description: Complete learning assessment and assess knowledge base.  Interventions:  - Provide teaching at level of understanding  - Provide teaching via preferred learning methods  Outcome: Progressing     Problem: Prexisting or High Potential for Compromised Skin Integrity  Goal: Skin integrity is maintained or improved  Description: INTERVENTIONS:  - Identify patients at risk for skin breakdown  - Assess and monitor skin integrity  - Assess and monitor nutrition and hydration status  - Monitor labs   - Assess for incontinence   - Turn and reposition patient  - Assist with mobility/ambulation  - Relieve pressure over bony prominences  - Avoid friction and shearing  - Provide appropriate hygiene as needed including keeping skin clean and dry  - Evaluate need for skin moisturizer/barrier cream  - Collaborate with interdisciplinary team   - Patient/family teaching  - Consider wound care consult   Outcome: Progressing

## 2024-12-28 NOTE — PROGRESS NOTES
Mathew Rico is a 74 y.o. male who is currently ordered Vancomycin IV with management by the Pharmacy Consult service.  Relevant clinical data and objective / subjective history reviewed.  Vancomycin Assessment:  Indication and Goal AUC/Trough: Pneumonia (goal -600, trough >10)  Clinical Status: stable  Micro:     Renal Function:  SCr: 0.75 mg/dL  CrCl: 74 mL/min  Renal replacement: Not on dialysis  Days of Therapy: 2  Current Dose: 750 mg IV q 12 hrs  Vancomycin Plan:  New Dosing: continue 750 mg IV q 12 hrs  Estimated AUC: 441 mcg*hr/mL  Estimated Trough: 12.1 mcg/mL  Next Level: 12/29/24 with am labs  Renal Function Monitoring: Daily BMP and UOP  Pharmacy will continue to follow closely for s/sx of nephrotoxicity, infusion reactions and appropriateness of therapy.  BMP and CBC will be ordered per protocol. We will continue to follow the patient’s culture results and clinical progress daily.    Viky Valdez, Pharmacist

## 2024-12-29 LAB
ANION GAP SERPL CALCULATED.3IONS-SCNC: 8 MMOL/L (ref 4–13)
BUN SERPL-MCNC: 9 MG/DL (ref 5–25)
CALCIUM SERPL-MCNC: 9.3 MG/DL (ref 8.4–10.2)
CHLORIDE SERPL-SCNC: 105 MMOL/L (ref 96–108)
CO2 SERPL-SCNC: 21 MMOL/L (ref 21–32)
CREAT SERPL-MCNC: 0.76 MG/DL (ref 0.6–1.3)
ERYTHROCYTE [DISTWIDTH] IN BLOOD BY AUTOMATED COUNT: 15.1 % (ref 11.6–15.1)
GFR SERPL CREATININE-BSD FRML MDRD: 89 ML/MIN/1.73SQ M
GLUCOSE SERPL-MCNC: 127 MG/DL (ref 65–140)
GLUCOSE SERPL-MCNC: 137 MG/DL (ref 65–140)
GLUCOSE SERPL-MCNC: 139 MG/DL (ref 65–140)
GLUCOSE SERPL-MCNC: 145 MG/DL (ref 65–140)
GLUCOSE SERPL-MCNC: 166 MG/DL (ref 65–140)
HCT VFR BLD AUTO: 39.2 % (ref 36.5–49.3)
HGB BLD-MCNC: 13 G/DL (ref 12–17)
MCH RBC QN AUTO: 27.5 PG (ref 26.8–34.3)
MCHC RBC AUTO-ENTMCNC: 33.2 G/DL (ref 31.4–37.4)
MCV RBC AUTO: 83 FL (ref 82–98)
PLATELET # BLD AUTO: 219 THOUSANDS/UL (ref 149–390)
PMV BLD AUTO: 8.4 FL (ref 8.9–12.7)
POTASSIUM SERPL-SCNC: 3.3 MMOL/L (ref 3.5–5.3)
RBC # BLD AUTO: 4.72 MILLION/UL (ref 3.88–5.62)
SODIUM SERPL-SCNC: 134 MMOL/L (ref 135–147)
VANCOMYCIN SERPL-MCNC: 25.4 UG/ML (ref 10–20)
WBC # BLD AUTO: 5.71 THOUSAND/UL (ref 4.31–10.16)

## 2024-12-29 PROCEDURE — 85027 COMPLETE CBC AUTOMATED: CPT | Performed by: INTERNAL MEDICINE

## 2024-12-29 PROCEDURE — 99232 SBSQ HOSP IP/OBS MODERATE 35: CPT | Performed by: INTERNAL MEDICINE

## 2024-12-29 PROCEDURE — 82948 REAGENT STRIP/BLOOD GLUCOSE: CPT

## 2024-12-29 PROCEDURE — 94640 AIRWAY INHALATION TREATMENT: CPT

## 2024-12-29 PROCEDURE — 94760 N-INVAS EAR/PLS OXIMETRY 1: CPT

## 2024-12-29 PROCEDURE — 80048 BASIC METABOLIC PNL TOTAL CA: CPT | Performed by: INTERNAL MEDICINE

## 2024-12-29 PROCEDURE — 80202 ASSAY OF VANCOMYCIN: CPT | Performed by: INTERNAL MEDICINE

## 2024-12-29 RX ADMIN — LEVALBUTEROL HYDROCHLORIDE 1.25 MG: 1.25 SOLUTION RESPIRATORY (INHALATION) at 20:24

## 2024-12-29 RX ADMIN — METHENAMINE HIPPURATE 1 G: 1000 TABLET ORAL at 16:06

## 2024-12-29 RX ADMIN — HEPARIN SODIUM 5000 UNITS: 5000 INJECTION INTRAVENOUS; SUBCUTANEOUS at 21:16

## 2024-12-29 RX ADMIN — GABAPENTIN 300 MG: 300 CAPSULE ORAL at 21:16

## 2024-12-29 RX ADMIN — POLYETHYLENE GLYCOL 3350 17 G: 17 POWDER, FOR SOLUTION ORAL at 09:21

## 2024-12-29 RX ADMIN — AMLODIPINE BESYLATE 10 MG: 10 TABLET ORAL at 09:18

## 2024-12-29 RX ADMIN — GABAPENTIN 300 MG: 300 CAPSULE ORAL at 09:18

## 2024-12-29 RX ADMIN — CEFEPIME 2000 MG: 2 INJECTION, POWDER, FOR SOLUTION INTRAVENOUS at 05:52

## 2024-12-29 RX ADMIN — MIRTAZAPINE 7.5 MG: 7.5 TABLET, FILM COATED ORAL at 21:16

## 2024-12-29 RX ADMIN — GABAPENTIN 300 MG: 300 CAPSULE ORAL at 16:06

## 2024-12-29 RX ADMIN — CEFEPIME 2000 MG: 2 INJECTION, POWDER, FOR SOLUTION INTRAVENOUS at 21:22

## 2024-12-29 RX ADMIN — CLOPIDOGREL BISULFATE 75 MG: 75 TABLET ORAL at 09:18

## 2024-12-29 RX ADMIN — GUAIFENESIN 600 MG: 600 TABLET ORAL at 09:18

## 2024-12-29 RX ADMIN — IPRATROPIUM BROMIDE 0.5 MG: 0.5 SOLUTION RESPIRATORY (INHALATION) at 20:23

## 2024-12-29 RX ADMIN — VANCOMYCIN HYDROCHLORIDE 750 MG: 750 INJECTION, SOLUTION INTRAVENOUS at 02:54

## 2024-12-29 RX ADMIN — INSULIN LISPRO 1 UNITS: 100 INJECTION, SOLUTION INTRAVENOUS; SUBCUTANEOUS at 21:17

## 2024-12-29 RX ADMIN — BACLOFEN 5 MG: 10 TABLET ORAL at 16:06

## 2024-12-29 RX ADMIN — LEVALBUTEROL HYDROCHLORIDE 1.25 MG: 1.25 SOLUTION RESPIRATORY (INHALATION) at 07:12

## 2024-12-29 RX ADMIN — BACLOFEN 5 MG: 10 TABLET ORAL at 21:16

## 2024-12-29 RX ADMIN — PROPRANOLOL HYDROCHLORIDE 60 MG: 60 CAPSULE, EXTENDED RELEASE ORAL at 09:20

## 2024-12-29 RX ADMIN — POLYETHYLENE GLYCOL 3350 17 G: 17 POWDER, FOR SOLUTION ORAL at 16:07

## 2024-12-29 RX ADMIN — HEPARIN SODIUM 5000 UNITS: 5000 INJECTION INTRAVENOUS; SUBCUTANEOUS at 05:00

## 2024-12-29 RX ADMIN — GUAIFENESIN 600 MG: 600 TABLET ORAL at 21:16

## 2024-12-29 RX ADMIN — BUDESONIDE AND FORMOTEROL FUMARATE DIHYDRATE 2 PUFF: 160; 4.5 AEROSOL RESPIRATORY (INHALATION) at 16:11

## 2024-12-29 RX ADMIN — Medication 500 MCG: at 09:18

## 2024-12-29 RX ADMIN — METHENAMINE HIPPURATE 1 G: 1000 TABLET ORAL at 09:20

## 2024-12-29 RX ADMIN — LATANOPROST 1 DROP: 50 SOLUTION OPHTHALMIC at 21:17

## 2024-12-29 RX ADMIN — ATORVASTATIN CALCIUM 80 MG: 80 TABLET, FILM COATED ORAL at 16:06

## 2024-12-29 RX ADMIN — BACLOFEN 5 MG: 10 TABLET ORAL at 09:18

## 2024-12-29 RX ADMIN — PANTOPRAZOLE SODIUM 40 MG: 40 TABLET, DELAYED RELEASE ORAL at 05:00

## 2024-12-29 RX ADMIN — IPRATROPIUM BROMIDE 0.5 MG: 0.5 SOLUTION RESPIRATORY (INHALATION) at 07:12

## 2024-12-29 RX ADMIN — HEPARIN SODIUM 5000 UNITS: 5000 INJECTION INTRAVENOUS; SUBCUTANEOUS at 13:42

## 2024-12-29 RX ADMIN — BUDESONIDE AND FORMOTEROL FUMARATE DIHYDRATE 2 PUFF: 160; 4.5 AEROSOL RESPIRATORY (INHALATION) at 09:21

## 2024-12-29 RX ADMIN — CEFEPIME 2000 MG: 2 INJECTION, POWDER, FOR SOLUTION INTRAVENOUS at 13:42

## 2024-12-29 RX ADMIN — SENNOSIDES AND DOCUSATE SODIUM 2 TABLET: 8.6; 5 TABLET ORAL at 21:16

## 2024-12-29 NOTE — ASSESSMENT & PLAN NOTE
Lab Results   Component Value Date    HGBA1C 9.0 (H) 10/06/2024       Recent Labs     12/28/24  1104 12/28/24  1544 12/28/24  2125 12/29/24  0810   POCGLU 155* 128 154* 127       Blood Sugar Average: Last 72 hrs:  (P) 124.5  Patient on metformin and Jardiance currently on hold  Monitor on diabetic diet, sliding scale and Accu-Cheks

## 2024-12-29 NOTE — PROGRESS NOTES
Progress Note - Hospitalist   Name: Mathew Rico 74 y.o. male I MRN: [id number]  Unit/Bed#: E5 -01 I Date of Admission: 12/25/2024   Date of Service: 12/29/2024 I Hospital Day: 4    Assessment & Plan  Sepsis (HCC)  74-year-old male with PMH of MS, paraplegia, neurogenic bladder with SPT and HTN presented with fevers  Met sepsis criteria with fevers, tachycardia with urinary source  SPT tube change on 12/25 in the ED  Continue antibiotics with cefepime, previous cultures grew Enterobacter  Blood cultures negative for 24 hours  Urine culture showed mixed contaminants.   Has worsening cough with sputum  Ct chest showed bronchiolitis  Continue vanco until mrsa is back   urine for legionella and strep- negative   sputum culture- pending   Covid/fl/rsv negative on the 25th of December. Repeat rp2 panel   Continue cough medications   Neurogenic bladder  Patient has chronic suprapubic catheter, changed during this admission in the ED.  Appreciate urology input  Follow outpatient with urology, has SPT tube changes every 28 days  Primary hypertension  Continue amlodipine, and propranolol with BP hold parameters  Blood pressure currently controlled  Type 2 diabetes mellitus with neuropathy ([facility])  Lab Results   Component Value Date    HGBA1C 9.0 (H) 10/06/2024       Recent Labs     12/28/24  1104 12/28/24  1544 12/28/24  2125 12/29/24  0810   POCGLU 155* 128 154* 127       Blood Sugar Average: Last 72 hrs:  (P) 124.5  Patient on metformin and Jardiance currently on hold  Monitor on diabetic diet, sliding scale and Accu-Cheks  Stercoral colitis  Patient has issues with chronic constipation and had multiple admissions in the past with severe abdominal pain due to sterile coral colitis and constipation.    Continue bowel regimen with MiraLAX and Senokot  Urinary tract infection associated with cystostomy catheter  (HCC)  As above under sepsis  Multiple sclerosis (HCC)  Patient has chronic urinary retention status post  suprapubic catheter.  Continue follow-up with neurology as previously recommended  History of stroke  Continue Plavix and statin    VTE Pharmacologic Prophylaxis:   heparin     Mobility:   Basic Mobility Inpatient Raw Score: 12  -[facility] Goal: 4: Move to chair/commode  -HL Achieved: 4: Move to chair/commode      Patient Centered Rounds: will discuss with his nurse     Education and Discussions with Family / Patient: Patient declined call to .     Current Length of Stay: 4 day(s)  Current Patient Status: Inpatient     Discharge Plan: Anticipate discharge in 48-72 hrs to home.    Code Status: Level 1 - Full Code    Subjective   Pt seen and examined. He states his cough is a little better than yesterday. He is still coughing up green sputum. No cp no n/v/d no abd pain     Objective :  Temp:  [98.5 °F (36.9 °C)-99.2 °F (37.3 °C)] 98.5 °F (36.9 °C)  HR:  [76-89] 88  BP: (107-125)/(41-88) 114/55  Resp:  [16] 16  SpO2:  [91 %-96 %] 93 %  O2 Device: None (Room air)    Body mass index is 26.96 kg/m².     Input and Output Summary (last 24 hours):     Intake/Output Summary (Last 24 hours) at 12/29/2024 0841  Last data filed at 12/29/2024 0501  Gross per 24 hour   Intake 500 ml   Output 1325 ml   Net -825 ml       Physical Exam  Constitutional:       Appearance: Normal appearance.   HENT:      Head: Normocephalic and atraumatic.   Eyes:      Extraocular Movements: Extraocular movements intact.      Pupils: Pupils are equal, round, and reactive to light.   Cardiovascular:      Rate and Rhythm: Normal rate and regular rhythm.      Heart sounds: No murmur heard.     No friction rub. No gallop.   Pulmonary:      Effort: Pulmonary effort is normal. No respiratory distress.      Breath sounds: Normal breath sounds. No wheezing, rhonchi or rales.      Comments: Coarse breath sounds bilateral   Abdominal:      General: Bowel sounds are normal. There is no distension.      Palpations: Abdomen is soft.      Tenderness:  There is no abdominal tenderness. There is no guarding or rebound.   Neurological:      Mental Status: He is alert and oriented to person, place, and time.       Lines/Drains:  Lines/Drains/Airways       Active Status       Name Placement date Placement time Site Days    Suprapubic Catheter 20 Fr. 12/10/24  1030  --  18                    Lab Results: I have reviewed the following results:   Results from last 7 days   Lab Units 12/28/24  0522 12/26/24  0439   WBC Thousand/uL 6.07 8.71   HEMOGLOBIN g/dL 13.0 12.5   HEMATOCRIT % 40.4 38.5   PLATELETS Thousands/uL 245 257   SEGS PCT %  --  75   LYMPHO PCT %  --  10*   MONO PCT %  --  11   EOS PCT %  --  3     Results from last 7 days   Lab Units 12/28/24  0522 12/26/24  0439 12/25/24  1133   SODIUM mmol/L 132*   < > 135   POTASSIUM mmol/L 3.4*   < > 3.9   CHLORIDE mmol/L 103   < > 99   CO2 mmol/L 20*   < > 25   BUN mg/dL 10   < > 9   CREATININE mg/dL 0.75   < > 0.95   ANION GAP mmol/L 9   < > 11   CALCIUM mg/dL 9.0   < > 9.9   ALBUMIN g/dL  --   --  4.0   TOTAL BILIRUBIN mg/dL  --   --  0.49   ALK PHOS U/L  --   --  94   ALT U/L  --   --  10   AST U/L  --   --  13   GLUCOSE RANDOM mg/dL 128   < > 106    < > = values in this interval not displayed.     Results from last 7 days   Lab Units 12/25/24  1133   INR  0.94     Results from last 7 days   Lab Units 12/29/24  0810 12/28/24 2125 12/28/24  1544 12/28/24  1104 12/28/24  0702 12/27/24  2110 12/27/24  1555 12/27/24  1121 12/27/24  0732 12/26/24  2106 12/26/24  1754 12/26/24  1539   POC GLUCOSE mg/dl 127 154* 128 155* 127 148* 106 112 94 108 115 144*         Results from last 7 days   Lab Units 12/28/24  0522 12/26/24  0439 12/25/24  1452 12/25/24  1133   LACTIC ACID mmol/L  --   --  1.4 2.9*   PROCALCITONIN ng/ml 0.10 0.10  --  0.10       Recent Cultures (last 7 days):   Results from last 7 days   Lab Units 12/27/24  1435 12/25/24  1352 12/25/24  1142 12/25/24  1133   BLOOD CULTURE   --   --  No Growth at 72 hrs. No  Growth at 72 hrs.   URINE CULTURE   --  >100,000 cfu/ml  --   --    LEGIONELLA URINARY ANTIGEN  Negative  --   --   --        Imaging Results Review: No pertinent imaging studies reviewed.  Other Study Results Review: No additional pertinent studies reviewed.    Last 24 Hours Medication List:     Current Facility-Administered Medications:     acetaminophen (TYLENOL) tablet 650 mg, Q6H PRN    amLODIPine (NORVASC) tablet 10 mg, Daily **AND** atorvastatin (LIPITOR) tablet 80 mg, Daily With Dinner    baclofen tablet 5 mg, TID    bisacodyl (DULCOLAX) rectal suppository 10 mg, Daily PRN    budesonide-formoterol (SYMBICORT) 160-4.5 mcg/act inhaler 2 puff, BID    ceFEPime (MAXIPIME) 2,000 mg in dextrose 5 % 50 mL IVPB, Q8H, Last Rate: 2,000 mg (12/29/24 0552)    clopidogrel (PLAVIX) tablet 75 mg, Daily    cyanocobalamin (VITAMIN B-12) tablet 500 mcg, Daily    gabapentin (NEURONTIN) capsule 300 mg, TID    [Held by provider] gabapentin (NEURONTIN) capsule 600 mg, HS    guaiFENesin (MUCINEX) 12 hr tablet 600 mg, Q12H CIERA    guaiFENesin (ROBITUSSIN) oral liquid 200 mg, Q4H PRN    heparin (porcine) subcutaneous injection 5,000 Units, Q8H CIERA **AND** [COMPLETED] Platelet count, Once    insulin lispro (HumALOG/ADMELOG) 100 units/mL subcutaneous injection 1-5 Units, 4x Daily (PC & HS) **AND** Fingerstick Glucose (POCT), 4x Daily AC and at bedtime    ipratropium (ATROVENT) 0.02 % inhalation solution 0.5 mg, TID    latanoprost (XALATAN) 0.005 % ophthalmic solution 1 drop, HS    levalbuterol (XOPENEX) inhalation solution 1.25 mg, TID    meclizine (ANTIVERT) tablet 12.5 mg, Q8H PRN    melatonin tablet 3 mg, HS PRN    methenamine hippurate (HIPREX) tablet 1 g, BID With Meals    mirtazapine (REMERON) tablet 7.5 mg, HS    pantoprazole (PROTONIX) EC tablet 40 mg, Early Morning    polyethylene glycol (MIRALAX) packet 17 g, BID    propranolol (INDERAL LA) 24 hr capsule 60 mg, Daily    senna-docusate sodium (SENOKOT S) 8.6-50 mg per tablet 2  tablet,     Administrative Statements   Today, Patient Was Seen By: Diana Phillips DO

## 2024-12-29 NOTE — PLAN OF CARE
Problem: PAIN - ADULT  Goal: Verbalizes/displays adequate comfort level or baseline comfort level  Description: Interventions:  - Encourage patient to monitor pain and request assistance  - Assess pain using appropriate pain scale  - Administer analgesics based on type and severity of pain and evaluate response  - Implement non-pharmacological measures as appropriate and evaluate response  - Consider cultural and social influences on pain and pain management  - Notify physician/advanced practitioner if interventions unsuccessful or patient reports new pain  Outcome: Progressing     Problem: INFECTION - ADULT  Goal: Absence or prevention of progression during hospitalization  Description: INTERVENTIONS:  - Assess and monitor for signs and symptoms of infection  - Monitor lab/diagnostic results  - Monitor all insertion sites, i.e. indwelling lines, tubes, and drains  - Monitor endotracheal if appropriate and nasal secretions for changes in amount and color  - [place] appropriate cooling/warming therapies per order  - Administer medications as ordered  - Instruct and encourage patient and family to use good hand hygiene technique  - Identify and instruct in appropriate isolation precautions for identified infection/condition  Outcome: Progressing  Goal: Absence of fever/infection during neutropenic period  Description: INTERVENTIONS:  - Monitor WBC    Outcome: Progressing     Problem: SAFETY ADULT  Goal: Patient will remain free of falls  Description: INTERVENTIONS:  - Educate patient/family on patient safety including physical limitations  - Instruct patient to call for assistance with activity   - Consult OT/PT to assist with strengthening/mobility   - Keep Call bell within reach  - Keep bed low and locked with side rails adjusted as appropriate  - Keep care items and personal belongings within reach  - Initiate and maintain comfort rounds  - Make Fall Risk Sign visible to staff  - Offer Toileting every 2 Hours,  in advance of need  - Initiate/Maintain bedalarm  - Obtain necessary fall risk management equipment:   - Apply yellow socks and bracelet for high fall risk patients  - Consider moving patient to room near nurses station  Outcome: Progressing  Goal: Maintain or return to baseline ADL function  Description: INTERVENTIONS:  -  Assess patient's ability to carry out ADLs; assess patient's baseline for ADL function and identify physical deficits which impact ability to perform ADLs (bathing, care of mouth/teeth, toileting, grooming, dressing, etc.)  - Assess/evaluate cause of self-care deficits   - Assess range of motion  - Assess patient's mobility; develop plan if impaired  - Assess patient's need for assistive devices and provide as appropriate  - Encourage maximum independence but intervene and supervise when necessary  - Involve family in performance of ADLs  - Assess for home care needs following discharge   - Consider OT consult to assist with ADL evaluation and planning for discharge  - Provide patient education as appropriate  Outcome: Progressing  Goal: Maintains/Returns to pre admission functional level  Description: INTERVENTIONS:  - Perform AM-PAC 6 Click Basic Mobility/ Daily Activity assessment daily.  - Set and communicate daily mobility goal to care team and patient/family/caregiver.   - Collaborate with rehabilitation services on mobility goals if consulted  - Out of bed for toileting  - Record patient progress and toleration of activity level   Outcome: Progressing     Problem: DISCHARGE PLANNING  Goal: Discharge to home or other facility with appropriate resources  Description: INTERVENTIONS:  - Identify barriers to discharge w/patient and caregiver  - Arrange for needed discharge resources and transportation as appropriate  - Identify discharge learning needs (meds, wound care, etc.)  - Arrange for interpretive services to assist at discharge as needed  - Refer to Case Management Department for  coordinating discharge planning if the patient needs post-hospital services based on physician/advanced practitioner order or complex needs related to functional status, cognitive ability, or social support system  Outcome: Progressing     Problem: Knowledge Deficit  Goal: Patient/family/caregiver demonstrates understanding of disease process, treatment plan, medications, and discharge instructions  Description: Complete learning assessment and assess knowledge base.  Interventions:  - Provide teaching at level of understanding  - Provide teaching via preferred learning methods  Outcome: Progressing     Problem: Prexisting or High Potential for Compromised Skin Integrity  Goal: Skin integrity is maintained or improved  Description: INTERVENTIONS:  - Identify patients at risk for skin breakdown  - Assess and monitor skin integrity  - Assess and monitor nutrition and hydration status  - Monitor labs   - Assess for incontinence   - Turn and reposition patient  - Assist with mobility/ambulation  - Relieve pressure over bony prominences  - Avoid friction and shearing  - Provide appropriate hygiene as needed including keeping skin clean and dry  - Evaluate need for skin moisturizer/barrier cream  - Collaborate with interdisciplinary team   - Patient/family teaching  - Consider wound care consult   Outcome: Progressing

## 2024-12-29 NOTE — ASSESSMENT & PLAN NOTE
74-year-old male with PMH of MS, paraplegia, neurogenic bladder with SPT and HTN presented with fevers  Met sepsis criteria with fevers, tachycardia with urinary source  SPT tube change on 12/25 in the ED  Continue antibiotics with cefepime, previous cultures grew Enterobacter  Cefepime increased to 2g iv q8 day 3/5  Blood cultures negative for 72 hours  Urine culture showed mixed contaminants.   Has worsening cough with sputum  Ct chest showed bronchiolitis  Mrsa was negative in which vanco was d/beth   urine for legionella and strep- negative   sputum culture- pending   Covid/fl/rsv negative on the 25th of December. Rp2 panel was positive for flu a. He is out of the window to start tamiflu. Will continue supportive treatment   Continue cough medications

## 2024-12-29 NOTE — PROGRESS NOTES
Progress Note - Hospitalist   Name: Mathew Rico 74 y.o. male I MRN: [id number]  Unit/Bed#: E5 -01 I Date of Admission: 12/25/2024   Date of Service: 12/29/2024 I Hospital Day: 4    Assessment & Plan  Sepsis (HCC)  74-year-old male with PMH of MS, paraplegia, neurogenic bladder with SPT and HTN presented with fevers  Met sepsis criteria with fevers, tachycardia with urinary source  SPT tube change on 12/25 in the ED  Continue antibiotics with cefepime, previous cultures grew Enterobacter  Cefepime increased to 2g iv q8 day 3/5  Blood cultures negative for 72 hours  Urine culture showed mixed contaminants.   Has worsening cough with sputum  Ct chest showed bronchiolitis  Mrsa was negative in which vanco was d/beth   urine for legionella and strep- negative   sputum culture- pending   Covid/fl/rsv negative on the 25th of December. Rp2 panel was positive for flu a. He is out of the window to start tamiflu. Will continue supportive treatment   Continue cough medications   Neurogenic bladder  Patient has chronic suprapubic catheter, changed during this admission in the ED.  Appreciate urology input  Follow outpatient with urology, has SPT tube changes every 28 days  Primary hypertension  Continue amlodipine, and propranolol with BP hold parameters  Blood pressure currently controlled  Type 2 diabetes mellitus with neuropathy ([facility])  Lab Results   Component Value Date    HGBA1C 9.0 (H) 10/06/2024       Recent Labs     12/28/24  1544 12/28/24  2125 12/29/24  0810 12/29/24  1125   POCGLU 128 154* 127 145*       Blood Sugar Average: Last 72 hrs:  (P) [id number]  Patient on metformin and Jardiance currently on hold  Monitor on diabetic diet, sliding scale and Accu-Cheks  Stercoral colitis  Patient has issues with chronic constipation and had multiple admissions in the past with severe abdominal pain due to sterile coral colitis and constipation.    Continue bowel regimen with MiraLAX and Senokot  Urinary  tract infection associated with cystostomy catheter  (HCC)  As above under sepsis  Multiple sclerosis (HCC)  Patient has chronic urinary retention status post suprapubic catheter.  Continue follow-up with neurology as previously recommended  History of stroke  Continue Plavix and statin    VTE Pharmacologic Prophylaxis:   heparin     Mobility:   Basic Mobility Inpatient Raw Score: 12  -HL Goal: 4: Move to chair/commode  -HLM Achieved: 4: Move to chair/commode      Patient Centered Rounds: I performed bedside rounds with nursing staff today.     Education and Discussions with Family / Patient: Updated  (brother) via phone.    Current Length of Stay: 4 day(s)  Current Patient Status: Inpatient     Discharge Plan: Anticipate discharge in 48-72 hrs to home.    Code Status: Level 1 - Full Code    Subjective   Pt seen and examined. He states he feels better. His cough is less. No problems over night     Objective :  Temp:  [98.5 °F (36.9 °C)-99.2 °F (37.3 °C)] 98.5 °F (36.9 °C)  HR:  [76-94] 94  BP: (114-125)/(50-88) 123/50  Resp:  [16] 16  SpO2:  [91 %-96 %] 93 %  O2 Device: None (Room air)    Body mass index is 26.96 kg/m².     Input and Output Summary (last 24 hours):     Intake/Output Summary (Last 24 hours) at 12/29/2024 1446  Last data filed at 12/29/2024 0501  Gross per 24 hour   Intake 240 ml   Output 750 ml   Net -510 ml       Physical Exam  Constitutional:       Appearance: Normal appearance.   HENT:      Head: Normocephalic and atraumatic.   Eyes:      Extraocular Movements: Extraocular movements intact.      Pupils: Pupils are equal, round, and reactive to light.   Cardiovascular:      Rate and Rhythm: Normal rate and regular rhythm.      Heart sounds: No murmur heard.     No friction rub. No gallop.   Pulmonary:      Effort: Pulmonary effort is normal. No respiratory distress.      Breath sounds: Normal breath sounds. No wheezing, rhonchi or rales.   Abdominal:      General: Bowel sounds  are normal. There is no distension.      Palpations: Abdomen is soft.      Tenderness: There is no abdominal tenderness. There is no guarding or rebound.   Neurological:      Mental Status: He is alert and oriented to person, place, and time.           Lines/Drains:  Lines/Drains/Airways       Active Status       Name Placement date Placement time Site Days    Suprapubic Catheter 20 Fr. 12/10/24  1030  --  19                    Lab Results: I have reviewed the following results:   Results from last 7 days   Lab Units 12/29/24  1008 12/28/24  0522 12/26/24  0439   WBC Thousand/uL 5.71   < > 8.71   HEMOGLOBIN g/dL 13.0   < > 12.5   HEMATOCRIT % 39.2   < > 38.5   PLATELETS Thousands/uL 219   < > 257   SEGS PCT %  --   --  75   LYMPHO PCT %  --   --  10*   MONO PCT %  --   --  11   EOS PCT %  --   --  3    < > = values in this interval not displayed.     Results from last 7 days   Lab Units 12/29/24  1008 12/26/24  0439 12/25/24  1133   SODIUM mmol/L 134*   < > 135   POTASSIUM mmol/L 3.3*   < > 3.9   CHLORIDE mmol/L 105   < > 99   CO2 mmol/L 21   < > 25   BUN mg/dL 9   < > 9   CREATININE mg/dL 0.76   < > 0.95   ANION GAP mmol/L 8   < > 11   CALCIUM mg/dL 9.3   < > 9.9   ALBUMIN g/dL  --   --  4.0   TOTAL BILIRUBIN mg/dL  --   --  0.49   ALK PHOS U/L  --   --  94   ALT U/L  --   --  10   AST U/L  --   --  13   GLUCOSE RANDOM mg/dL 137   < > 106    < > = values in this interval not displayed.     Results from last 7 days   Lab Units 12/25/24  1133   INR  0.94     Results from last 7 days   Lab Units 12/29/24  1125 12/29/24  0810 12/28/24  2125 12/28/24  1544 12/28/24  1104 12/28/24  0702 12/27/24  2110 12/27/24  1555 12/27/24  1121 12/27/24  0732 12/26/24  2106 12/26/24  1754   POC GLUCOSE mg/dl 145* 127 154* 128 155* 127 148* 106 112 94 108 115         Results from last 7 days   Lab Units 12/28/24  0522 12/26/24  0439 12/25/24  1452 12/25/24  1133   LACTIC ACID mmol/L  --   --  1.4 2.9*   PROCALCITONIN ng/ml 0.10 0.10   --  0.10       Recent Cultures (last 7 days):   Results from last 7 days   Lab Units 12/27/24  1435 12/25/24  1352 12/25/24  1142 12/25/24  1133   BLOOD CULTURE   --   --  No Growth at 72 hrs. No Growth at 72 hrs.   URINE CULTURE   --  >100,000 cfu/ml  --   --    LEGIONELLA URINARY ANTIGEN  Negative  --   --   --        Imaging Results Review: No pertinent imaging studies reviewed.  Other Study Results Review: No additional pertinent studies reviewed.    Last 24 Hours Medication List:     Current Facility-Administered Medications:     acetaminophen (TYLENOL) tablet 650 mg, Q6H PRN    amLODIPine (NORVASC) tablet 10 mg, Daily **AND** atorvastatin (LIPITOR) tablet 80 mg, Daily With Dinner    baclofen tablet 5 mg, TID    bisacodyl (DULCOLAX) rectal suppository 10 mg, Daily PRN    budesonide-formoterol (SYMBICORT) 160-4.5 mcg/act inhaler 2 puff, BID    ceFEPime (MAXIPIME) 2,000 mg in dextrose 5 % 50 mL IVPB, Q8H, Last Rate: 2,000 mg (12/29/24 1342)    clopidogrel (PLAVIX) tablet 75 mg, Daily    cyanocobalamin (VITAMIN B-12) tablet 500 mcg, Daily    gabapentin (NEURONTIN) capsule 300 mg, TID    [Held by provider] gabapentin (NEURONTIN) capsule 600 mg, HS    guaiFENesin (MUCINEX) 12 hr tablet 600 mg, Q12H CIERA    guaiFENesin (ROBITUSSIN) oral liquid 200 mg, Q4H PRN    heparin (porcine) subcutaneous injection 5,000 Units, Q8H CIERA **AND** [COMPLETED] Platelet count, Once    insulin lispro (HumALOG/ADMELOG) 100 units/mL subcutaneous injection 1-5 Units, 4x Daily (PC & HS) **AND** Fingerstick Glucose (POCT), 4x Daily AC and at bedtime    ipratropium (ATROVENT) 0.02 % inhalation solution 0.5 mg, TID    latanoprost (XALATAN) 0.005 % ophthalmic solution 1 drop, HS    levalbuterol (XOPENEX) inhalation solution 1.25 mg, TID    meclizine (ANTIVERT) tablet 12.5 mg, Q8H PRN    melatonin tablet 3 mg, HS PRN    methenamine hippurate (HIPREX) tablet 1 g, BID With Meals    mirtazapine (REMERON) tablet 7.5 mg, HS    pantoprazole (PROTONIX)  EC tablet 40 mg, Early Morning    polyethylene glycol (MIRALAX) packet 17 g, BID    propranolol (INDERAL LA) 24 hr capsule 60 mg, Daily    senna-docusate sodium (SENOKOT S) 8.6-50 mg per tablet 2 tablet, HS    Administrative Statements   Today, Patient Was Seen By: Diana Phillips DO

## 2024-12-29 NOTE — ASSESSMENT & PLAN NOTE
Lab Results   Component Value Date    HGBA1C 9.0 (H) 10/06/2024       Recent Labs     12/28/24  1544 12/28/24  2125 12/29/24  0810 12/29/24  1125   POCGLU 128 154* 127 145*       Blood Sugar Average: Last 72 hrs:  (P) [id number]  Patient on metformin and Jardiance currently on hold  Monitor on diabetic diet, sliding scale and Accu-Cheks

## 2024-12-29 NOTE — PLAN OF CARE
Problem: SAFETY ADULT  Goal: Patient will remain free of falls  Description: INTERVENTIONS:  - Educate patient/family on patient safety including physical limitations  - Instruct patient to call for assistance with activity   - Consult OT/PT to assist with strengthening/mobility   - Keep Call bell within reach  - Keep bed low and locked with side rails adjusted as appropriate  - Keep care items and personal belongings within reach  - Initiate and maintain comfort rounds  - Make Fall Risk Sign visible to staff  - Offer Toileting every 2 Hours, in advance of need  - Initiate/Maintain bedalarm  - Obtain necessary fall risk management equipment:   - Apply yellow socks and bracelet for high fall risk patients  - Consider moving patient to room near nurses station  Outcome: Progressing

## 2024-12-30 LAB
BACTERIA BLD CULT: NORMAL
BACTERIA BLD CULT: NORMAL
GLUCOSE SERPL-MCNC: 109 MG/DL (ref 65–140)
GLUCOSE SERPL-MCNC: 115 MG/DL (ref 65–140)
GLUCOSE SERPL-MCNC: 151 MG/DL (ref 65–140)
GLUCOSE SERPL-MCNC: 154 MG/DL (ref 65–140)

## 2024-12-30 PROCEDURE — 94760 N-INVAS EAR/PLS OXIMETRY 1: CPT

## 2024-12-30 PROCEDURE — 94640 AIRWAY INHALATION TREATMENT: CPT

## 2024-12-30 PROCEDURE — 99233 SBSQ HOSP IP/OBS HIGH 50: CPT | Performed by: INTERNAL MEDICINE

## 2024-12-30 PROCEDURE — 82948 REAGENT STRIP/BLOOD GLUCOSE: CPT

## 2024-12-30 RX ORDER — POTASSIUM CHLORIDE 1500 MG/1
40 TABLET, EXTENDED RELEASE ORAL ONCE
Status: COMPLETED | OUTPATIENT
Start: 2024-12-30 | End: 2024-12-30

## 2024-12-30 RX ADMIN — INSULIN LISPRO 1 UNITS: 100 INJECTION, SOLUTION INTRAVENOUS; SUBCUTANEOUS at 11:54

## 2024-12-30 RX ADMIN — PANTOPRAZOLE SODIUM 40 MG: 40 TABLET, DELAYED RELEASE ORAL at 05:20

## 2024-12-30 RX ADMIN — Medication 500 MCG: at 09:09

## 2024-12-30 RX ADMIN — CEFEPIME 2000 MG: 2 INJECTION, POWDER, FOR SOLUTION INTRAVENOUS at 13:52

## 2024-12-30 RX ADMIN — LEVALBUTEROL HYDROCHLORIDE 1.25 MG: 1.25 SOLUTION RESPIRATORY (INHALATION) at 07:09

## 2024-12-30 RX ADMIN — GABAPENTIN 300 MG: 300 CAPSULE ORAL at 09:09

## 2024-12-30 RX ADMIN — IPRATROPIUM BROMIDE 0.5 MG: 0.5 SOLUTION RESPIRATORY (INHALATION) at 13:32

## 2024-12-30 RX ADMIN — BACLOFEN 5 MG: 10 TABLET ORAL at 16:45

## 2024-12-30 RX ADMIN — LATANOPROST 1 DROP: 50 SOLUTION OPHTHALMIC at 22:50

## 2024-12-30 RX ADMIN — SENNOSIDES AND DOCUSATE SODIUM 2 TABLET: 8.6; 5 TABLET ORAL at 22:43

## 2024-12-30 RX ADMIN — HEPARIN SODIUM 5000 UNITS: 5000 INJECTION INTRAVENOUS; SUBCUTANEOUS at 22:49

## 2024-12-30 RX ADMIN — POLYETHYLENE GLYCOL 3350 17 G: 17 POWDER, FOR SOLUTION ORAL at 09:07

## 2024-12-30 RX ADMIN — LEVALBUTEROL HYDROCHLORIDE 1.25 MG: 1.25 SOLUTION RESPIRATORY (INHALATION) at 13:32

## 2024-12-30 RX ADMIN — INSULIN LISPRO 1 UNITS: 100 INJECTION, SOLUTION INTRAVENOUS; SUBCUTANEOUS at 09:06

## 2024-12-30 RX ADMIN — METHENAMINE HIPPURATE 1 G: 1000 TABLET ORAL at 16:46

## 2024-12-30 RX ADMIN — METHENAMINE HIPPURATE 1 G: 1000 TABLET ORAL at 07:30

## 2024-12-30 RX ADMIN — POLYETHYLENE GLYCOL 3350 17 G: 17 POWDER, FOR SOLUTION ORAL at 17:26

## 2024-12-30 RX ADMIN — BUDESONIDE AND FORMOTEROL FUMARATE DIHYDRATE 2 PUFF: 160; 4.5 AEROSOL RESPIRATORY (INHALATION) at 09:06

## 2024-12-30 RX ADMIN — POTASSIUM CHLORIDE 40 MEQ: 1500 TABLET, EXTENDED RELEASE ORAL at 16:44

## 2024-12-30 RX ADMIN — CEFEPIME 2000 MG: 2 INJECTION, POWDER, FOR SOLUTION INTRAVENOUS at 05:31

## 2024-12-30 RX ADMIN — HEPARIN SODIUM 5000 UNITS: 5000 INJECTION INTRAVENOUS; SUBCUTANEOUS at 13:52

## 2024-12-30 RX ADMIN — CLOPIDOGREL BISULFATE 75 MG: 75 TABLET ORAL at 09:10

## 2024-12-30 RX ADMIN — HEPARIN SODIUM 5000 UNITS: 5000 INJECTION INTRAVENOUS; SUBCUTANEOUS at 05:20

## 2024-12-30 RX ADMIN — BACLOFEN 5 MG: 10 TABLET ORAL at 22:43

## 2024-12-30 RX ADMIN — BACLOFEN 5 MG: 10 TABLET ORAL at 09:10

## 2024-12-30 RX ADMIN — AMLODIPINE BESYLATE 10 MG: 10 TABLET ORAL at 09:10

## 2024-12-30 RX ADMIN — GABAPENTIN 300 MG: 300 CAPSULE ORAL at 22:43

## 2024-12-30 RX ADMIN — IPRATROPIUM BROMIDE 0.5 MG: 0.5 SOLUTION RESPIRATORY (INHALATION) at 07:10

## 2024-12-30 RX ADMIN — GUAIFENESIN 600 MG: 600 TABLET ORAL at 22:43

## 2024-12-30 RX ADMIN — BUDESONIDE AND FORMOTEROL FUMARATE DIHYDRATE 2 PUFF: 160; 4.5 AEROSOL RESPIRATORY (INHALATION) at 17:26

## 2024-12-30 RX ADMIN — GABAPENTIN 300 MG: 300 CAPSULE ORAL at 16:44

## 2024-12-30 RX ADMIN — CEFEPIME 2000 MG: 2 INJECTION, POWDER, FOR SOLUTION INTRAVENOUS at 22:50

## 2024-12-30 RX ADMIN — GUAIFENESIN 600 MG: 600 TABLET ORAL at 09:09

## 2024-12-30 RX ADMIN — ACETAMINOPHEN 650 MG: 325 TABLET, FILM COATED ORAL at 13:59

## 2024-12-30 RX ADMIN — IPRATROPIUM BROMIDE 0.5 MG: 0.5 SOLUTION RESPIRATORY (INHALATION) at 19:27

## 2024-12-30 RX ADMIN — LEVALBUTEROL HYDROCHLORIDE 1.25 MG: 1.25 SOLUTION RESPIRATORY (INHALATION) at 19:27

## 2024-12-30 RX ADMIN — ATORVASTATIN CALCIUM 80 MG: 80 TABLET, FILM COATED ORAL at 16:45

## 2024-12-30 RX ADMIN — MIRTAZAPINE 7.5 MG: 7.5 TABLET, FILM COATED ORAL at 22:43

## 2024-12-30 RX ADMIN — PROPRANOLOL HYDROCHLORIDE 60 MG: 60 CAPSULE, EXTENDED RELEASE ORAL at 09:06

## 2024-12-30 NOTE — CASE MANAGEMENT
Case Management Progress Note    Patient name Mathew Rico  Location [facility] 5 /E5 -* MRN [id number]  : 1950 Date 2024       LOS (days): 5  Geometric Mean LOS (GMLOS) (days): 3.5  Days to GMLOS:-1.6        OBJECTIVE:     Current admission status: Inpatient  Preferred Pharmacy:   [facility]/pharmacy #[id number] - [place], [place] - [address] [facility]  [address] [facility] [address] [address]  Phone: [phone] Fax: [phone]    [place], [place] -  [address] [address]  Phone: [phone] Fax: [phone]    [facility] [place], [place] - [address],  [address]  First [address] [facility] [address]  Phone: [phone] Fax: [phone]     [facility]Y [facility]. [place], [place] - [address] [address]  Phone: [phone] Fax: [phone]    Primary Care Provider: Carolina Kumari MD    Primary Insurance: [employer] [facility]  Secondary Insurance:     PROGRESS NOTE:    Cm has not received a call from pt's RN with [employer] as a message was sent to her by pt's [employer]  last  to call cm. Cm called [employer] again at [phone] and left a message for  Narda to discuss dc.

## 2024-12-30 NOTE — PLAN OF CARE
Problem: PAIN - ADULT  Goal: Verbalizes/displays adequate comfort level or baseline comfort level  Description: Interventions:  - Encourage patient to monitor pain and request assistance  - Assess pain using appropriate pain scale  - Administer analgesics based on type and severity of pain and evaluate response  - Implement non-pharmacological measures as appropriate and evaluate response  - Consider cultural and social influences on pain and pain management  - Notify physician/advanced practitioner if interventions unsuccessful or patient reports new pain  Outcome: Progressing     Problem: INFECTION - ADULT  Goal: Absence or prevention of progression during hospitalization  Description: INTERVENTIONS:  - Assess and monitor for signs and symptoms of infection  - Monitor lab/diagnostic results  - Monitor all insertion sites, i.e. indwelling lines, tubes, and drains  - Monitor endotracheal if appropriate and nasal secretions for changes in amount and color  - [place] appropriate cooling/warming therapies per order  - Administer medications as ordered  - Instruct and encourage patient and family to use good hand hygiene technique  - Identify and instruct in appropriate isolation precautions for identified infection/condition  Outcome: Progressing  Goal: Absence of fever/infection during neutropenic period  Description: INTERVENTIONS:  - Monitor WBC    Outcome: Progressing     Problem: SAFETY ADULT  Goal: Patient will remain free of falls  Description: INTERVENTIONS:  - Educate patient/family on patient safety including physical limitations  - Instruct patient to call for assistance with activity   - Consult OT/PT to assist with strengthening/mobility   - Keep Call bell within reach  - Keep bed low and locked with side rails adjusted as appropriate  - Keep care items and personal belongings within reach  - Initiate and maintain comfort rounds  - Make Fall Risk Sign visible to staff  - Offer Toileting every 2 Hours,  in advance of need  - Initiate/Maintain bedalarm  - Obtain necessary fall risk management equipment:   - Apply yellow socks and bracelet for high fall risk patients  - Consider moving patient to room near nurses station  Outcome: Progressing  Goal: Maintain or return to baseline ADL function  Description: INTERVENTIONS:  -  Assess patient's ability to carry out ADLs; assess patient's baseline for ADL function and identify physical deficits which impact ability to perform ADLs (bathing, care of mouth/teeth, toileting, grooming, dressing, etc.)  - Assess/evaluate cause of self-care deficits   - Assess range of motion  - Assess patient's mobility; develop plan if impaired  - Assess patient's need for assistive devices and provide as appropriate  - Encourage maximum independence but intervene and supervise when necessary  - Involve family in performance of ADLs  - Assess for home care needs following discharge   - Consider OT consult to assist with ADL evaluation and planning for discharge  - Provide patient education as appropriate  Outcome: Progressing  Goal: Maintains/Returns to pre admission functional level  Description: INTERVENTIONS:  - Perform AM-PAC 6 Click Basic Mobility/ Daily Activity assessment daily.  - Set and communicate daily mobility goal to care team and patient/family/caregiver.   - Collaborate with rehabilitation services on mobility goals if consulted  - Out of bed for toileting  - Record patient progress and toleration of activity level   Outcome: Progressing     Problem: DISCHARGE PLANNING  Goal: Discharge to home or other facility with appropriate resources  Description: INTERVENTIONS:  - Identify barriers to discharge w/patient and caregiver  - Arrange for needed discharge resources and transportation as appropriate  - Identify discharge learning needs (meds, wound care, etc.)  - Arrange for interpretive services to assist at discharge as needed  - Refer to Case Management Department for  coordinating discharge planning if the patient needs post-hospital services based on physician/advanced practitioner order or complex needs related to functional status, cognitive ability, or social support system  Outcome: Progressing     Problem: Knowledge Deficit  Goal: Patient/family/caregiver demonstrates understanding of disease process, treatment plan, medications, and discharge instructions  Description: Complete learning assessment and assess knowledge base.  Interventions:  - Provide teaching at level of understanding  - Provide teaching via preferred learning methods  Outcome: Progressing     Problem: Prexisting or High Potential for Compromised Skin Integrity  Goal: Skin integrity is maintained or improved  Description: INTERVENTIONS:  - Identify patients at risk for skin breakdown  - Assess and monitor skin integrity  - Assess and monitor nutrition and hydration status  - Monitor labs   - Assess for incontinence   - Turn and reposition patient  - Assist with mobility/ambulation  - Relieve pressure over bony prominences  - Avoid friction and shearing  - Provide appropriate hygiene as needed including keeping skin clean and dry  - Evaluate need for skin moisturizer/barrier cream  - Collaborate with interdisciplinary team   - Patient/family teaching  - Consider wound care consult   Outcome: Progressing

## 2024-12-30 NOTE — PLAN OF CARE
Problem: PAIN - ADULT  Goal: Verbalizes/displays adequate comfort level or baseline comfort level  Description: Interventions:  - Encourage patient to monitor pain and request assistance  - Assess pain using appropriate pain scale  - Administer analgesics based on type and severity of pain and evaluate response  - Implement non-pharmacological measures as appropriate and evaluate response  - Consider cultural and social influences on pain and pain management  - Notify physician/advanced practitioner if interventions unsuccessful or patient reports new pain  Outcome: Progressing     Problem: INFECTION - ADULT  Goal: Absence or prevention of progression during hospitalization  Description: INTERVENTIONS:  - Assess and monitor for signs and symptoms of infection  - Monitor lab/diagnostic results  - Monitor all insertion sites, i.e. indwelling lines, tubes, and drains  - Monitor endotracheal if appropriate and nasal secretions for changes in amount and color  - [place] appropriate cooling/warming therapies per order  - Administer medications as ordered  - Instruct and encourage patient and family to use good hand hygiene technique  - Identify and instruct in appropriate isolation precautions for identified infection/condition  Outcome: Progressing  Goal: Absence of fever/infection during neutropenic period  Description: INTERVENTIONS:  - Monitor WBC    Outcome: Progressing     Problem: SAFETY ADULT  Goal: Patient will remain free of falls  Description: INTERVENTIONS:  - Educate patient/family on patient safety including physical limitations  - Instruct patient to call for assistance with activity   - Consult OT/PT to assist with strengthening/mobility   - Keep Call bell within reach  - Keep bed low and locked with side rails adjusted as appropriate  - Keep care items and personal belongings within reach  - Initiate and maintain comfort rounds  - Make Fall Risk Sign visible to staff  - Offer Toileting every 2 Hours,  in advance of need  - Initiate/Maintain bedalarm  - Obtain necessary fall risk management equipment:   - Apply yellow socks and bracelet for high fall risk patients  - Consider moving patient to room near nurses station  Outcome: Progressing  Goal: Maintain or return to baseline ADL function  Description: INTERVENTIONS:  -  Assess patient's ability to carry out ADLs; assess patient's baseline for ADL function and identify physical deficits which impact ability to perform ADLs (bathing, care of mouth/teeth, toileting, grooming, dressing, etc.)  - Assess/evaluate cause of self-care deficits   - Assess range of motion  - Assess patient's mobility; develop plan if impaired  - Assess patient's need for assistive devices and provide as appropriate  - Encourage maximum independence but intervene and supervise when necessary  - Involve family in performance of ADLs  - Assess for home care needs following discharge   - Consider OT consult to assist with ADL evaluation and planning for discharge  - Provide patient education as appropriate  Outcome: Progressing  Goal: Maintains/Returns to pre admission functional level  Description: INTERVENTIONS:  - Perform AM-PAC 6 Click Basic Mobility/ Daily Activity assessment daily.  - Set and communicate daily mobility goal to care team and patient/family/caregiver.   - Collaborate with rehabilitation services on mobility goals if consulted  - Out of bed for toileting  - Record patient progress and toleration of activity level   Outcome: Progressing     Problem: DISCHARGE PLANNING  Goal: Discharge to home or other facility with appropriate resources  Description: INTERVENTIONS:  - Identify barriers to discharge w/patient and caregiver  - Arrange for needed discharge resources and transportation as appropriate  - Identify discharge learning needs (meds, wound care, etc.)  - Arrange for interpretive services to assist at discharge as needed  - Refer to Case Management Department for  coordinating discharge planning if the patient needs post-hospital services based on physician/advanced practitioner order or complex needs related to functional status, cognitive ability, or social support system  Outcome: Progressing     Problem: Knowledge Deficit  Goal: Patient/family/caregiver demonstrates understanding of disease process, treatment plan, medications, and discharge instructions  Description: Complete learning assessment and assess knowledge base.  Interventions:  - Provide teaching at level of understanding  - Provide teaching via preferred learning methods  Outcome: Progressing     Problem: Prexisting or High Potential for Compromised Skin Integrity  Goal: Skin integrity is maintained or improved  Description: INTERVENTIONS:  - Identify patients at risk for skin breakdown  - Assess and monitor skin integrity  - Assess and monitor nutrition and hydration status  - Monitor labs   - Assess for incontinence   - Turn and reposition patient  - Assist with mobility/ambulation  - Relieve pressure over bony prominences  - Avoid friction and shearing  - Provide appropriate hygiene as needed including keeping skin clean and dry  - Evaluate need for skin moisturizer/barrier cream  - Collaborate with interdisciplinary team   - Patient/family teaching  - Consider wound care consult   Outcome: Progressing

## 2024-12-31 VITALS
WEIGHT: 156.53 LBS | DIASTOLIC BLOOD PRESSURE: 86 MMHG | SYSTOLIC BLOOD PRESSURE: 107 MMHG | OXYGEN SATURATION: 91 % | TEMPERATURE: 97.8 F | HEART RATE: 99 BPM | BODY MASS INDEX: 26.05 KG/M2 | RESPIRATION RATE: 18 BRPM

## 2024-12-31 LAB
ANION GAP SERPL CALCULATED.3IONS-SCNC: 10 MMOL/L (ref 4–13)
BUN SERPL-MCNC: 11 MG/DL (ref 5–25)
CALCIUM SERPL-MCNC: 9.7 MG/DL (ref 8.4–10.2)
CHLORIDE SERPL-SCNC: 106 MMOL/L (ref 96–108)
CO2 SERPL-SCNC: 20 MMOL/L (ref 21–32)
CREAT SERPL-MCNC: 0.68 MG/DL (ref 0.6–1.3)
ERYTHROCYTE [DISTWIDTH] IN BLOOD BY AUTOMATED COUNT: 15.3 % (ref 11.6–15.1)
GFR SERPL CREATININE-BSD FRML MDRD: 93 ML/MIN/1.73SQ M
GLUCOSE SERPL-MCNC: 159 MG/DL (ref 65–140)
GLUCOSE SERPL-MCNC: 161 MG/DL (ref 65–140)
GLUCOSE SERPL-MCNC: 176 MG/DL (ref 65–140)
HCT VFR BLD AUTO: 47.8 % (ref 36.5–49.3)
HGB BLD-MCNC: 15.7 G/DL (ref 12–17)
MCH RBC QN AUTO: 26.7 PG (ref 26.8–34.3)
MCHC RBC AUTO-ENTMCNC: 32.8 G/DL (ref 31.4–37.4)
MCV RBC AUTO: 81 FL (ref 82–98)
PLATELET # BLD AUTO: 249 THOUSANDS/UL (ref 149–390)
PMV BLD AUTO: 8.3 FL (ref 8.9–12.7)
POTASSIUM SERPL-SCNC: 3.6 MMOL/L (ref 3.5–5.3)
RBC # BLD AUTO: 5.89 MILLION/UL (ref 3.88–5.62)
SODIUM SERPL-SCNC: 136 MMOL/L (ref 135–147)
WBC # BLD AUTO: 7.98 THOUSAND/UL (ref 4.31–10.16)

## 2024-12-31 PROCEDURE — 94640 AIRWAY INHALATION TREATMENT: CPT

## 2024-12-31 PROCEDURE — 99239 HOSP IP/OBS DSCHRG MGMT >30: CPT | Performed by: INTERNAL MEDICINE

## 2024-12-31 PROCEDURE — 85027 COMPLETE CBC AUTOMATED: CPT | Performed by: INTERNAL MEDICINE

## 2024-12-31 PROCEDURE — 80048 BASIC METABOLIC PNL TOTAL CA: CPT | Performed by: INTERNAL MEDICINE

## 2024-12-31 PROCEDURE — 94760 N-INVAS EAR/PLS OXIMETRY 1: CPT

## 2024-12-31 PROCEDURE — 82948 REAGENT STRIP/BLOOD GLUCOSE: CPT

## 2024-12-31 RX ADMIN — IPRATROPIUM BROMIDE 0.5 MG: 0.5 SOLUTION RESPIRATORY (INHALATION) at 07:58

## 2024-12-31 RX ADMIN — BACLOFEN 5 MG: 10 TABLET ORAL at 09:12

## 2024-12-31 RX ADMIN — CEFEPIME 2000 MG: 2 INJECTION, POWDER, FOR SOLUTION INTRAVENOUS at 05:24

## 2024-12-31 RX ADMIN — Medication 500 MCG: at 09:12

## 2024-12-31 RX ADMIN — PANTOPRAZOLE SODIUM 40 MG: 40 TABLET, DELAYED RELEASE ORAL at 04:54

## 2024-12-31 RX ADMIN — POLYETHYLENE GLYCOL 3350 17 G: 17 POWDER, FOR SOLUTION ORAL at 09:12

## 2024-12-31 RX ADMIN — HEPARIN SODIUM 5000 UNITS: 5000 INJECTION INTRAVENOUS; SUBCUTANEOUS at 04:54

## 2024-12-31 RX ADMIN — GABAPENTIN 300 MG: 300 CAPSULE ORAL at 09:12

## 2024-12-31 RX ADMIN — MECLIZINE HYDROCHLORIDE 12.5 MG: 12.5 TABLET ORAL at 10:58

## 2024-12-31 RX ADMIN — BUDESONIDE AND FORMOTEROL FUMARATE DIHYDRATE 2 PUFF: 160; 4.5 AEROSOL RESPIRATORY (INHALATION) at 09:12

## 2024-12-31 RX ADMIN — CLOPIDOGREL BISULFATE 75 MG: 75 TABLET ORAL at 09:12

## 2024-12-31 RX ADMIN — GUAIFENESIN 600 MG: 600 TABLET ORAL at 09:13

## 2024-12-31 RX ADMIN — METHENAMINE HIPPURATE 1 G: 1000 TABLET ORAL at 09:12

## 2024-12-31 RX ADMIN — LEVALBUTEROL HYDROCHLORIDE 1.25 MG: 1.25 SOLUTION RESPIRATORY (INHALATION) at 07:58

## 2024-12-31 NOTE — PROGRESS NOTES
Progress Note - Hospitalist   Name: Mathew Rico 74 y.o. male I MRN: [id number]  Unit/Bed#: E5 -01 I Date of Admission: 12/25/2024   Date of Service: 12/30/2024 I Hospital Day: 5    Assessment & Plan  Sepsis (HCC)  74-year-old male with PMH of MS, paraplegia, neurogenic bladder with SPT and HTN presented with fevers-likely secondary to influenza  Met sepsis criteria with fevers, tachycardia with source being flu  SPT tube change on 12/25 in the ED  Continue antibiotics with cefepime, previous cultures grew Enterobacter  Cefepime increased to 2g iv q8 day 4/5  Blood cultures negative for 72 hours  Urine culture showed mixed contaminants.   Ct chest showed bronchiolitis, secondary to the flu  Neurogenic bladder  Patient has chronic suprapubic catheter, changed during this admission in the ED.  Appreciate urology input  Follow outpatient with urology, has SPT tube changes every 28 days  Primary hypertension  Continue amlodipine, and propranolol with BP hold parameters  Blood pressure currently controlled  Type 2 diabetes mellitus with neuropathy ([facility])  Lab Results   Component Value Date    HGBA1C 9.0 (H) 10/06/2024       Recent Labs     12/29/24  2044 12/30/24  0748 12/30/24  1151 12/30/24  1633   POCGLU 166* 151* 154* 115       Blood Sugar Average: Last 72 hrs:  (P) [id number]  Patient on metformin and Jardiance currently on hold  Monitor on diabetic diet, sliding scale and Accu-Cheks  Stercoral colitis  Patient has issues with chronic constipation and had multiple admissions in the past with severe abdominal pain due to sterile coral colitis and constipation.  Will place outpatient referral to colorectal surgery to discuss possible diverting ostomy in the future    Continue bowel regimen with MiraLAX and Senokot  Urinary tract infection associated with cystostomy catheter  (HCC)  As above under sepsis  Present on admission  Multiple sclerosis (HCC)  Patient has chronic urinary retention status  post suprapubic catheter.  Continue follow-up with neurology as previously recommended  Has neurogenic bladder and bowels  History of stroke  Continue Plavix and statin            Hospital Course:     74-year-old male patient presenting with influenza, initiated on cefepime for possible UTI however cultures remain negative.  He is currently on day #4 of 5.    Assessment:      Principal Problem:    Sepsis ([facility])  Active Problems:    Neurogenic bladder    Primary hypertension    Type 2 diabetes mellitus with neuropathy ([facility])    Stercoral colitis    Urinary tract infection associated with cystostomy catheter  ([facility])    Type 2 diabetes mellitus without complication, without long-term current use of insulin ([facility])    Multiple sclerosis ([facility])    History of stroke      Plan:    Continue cefepime for last dose  Otherwise okay for discharge as he is on room air.       VTE Pharmacologic Prophylaxis:   Pharmacologic: Heparin  Mechanical VTE Prophylaxis in Place: Yes    AM-PAC Basic Mobility:  Basic Mobility Inpatient Raw Score: 12    -[facility] Achieved: 2: Bed activities/Dependent transfer  -HLM Goal: 4: Move to chair/commode    HLM Goal listed above. Continue with multidisciplinary rounding and encourage appropriate mobility to improve upon HLM goals.         Patient Centered Rounds: Case discussed and reviewed with nursing    Discussions with Specialists or Other Care Team Provider: Case management    Education and Discussions with Family / Patient: Discussed with patient's sister    Time Spent for Care: 80 minutes.  More than 50% of total time spent on counseling and coordination of care as described above.    Current Length of Stay: 5 day(s)    Current Patient Status: Inpatient   Certification Statement: The patient will continue to require additional inpatient hospital stay due to last dose of cefepime    Discharge Plan / Estimated Discharge Date: 24 hours    Code Status: Level 1 - Full Code      Subjective:   Seen and  examined, no acute complaints.  No nausea no vomiting, no abdominal pain.  Tolerating oral diet    A complete and comprehensive 14 point organ system review has been performed and all other systems are negative other than stated above.    Objective:     Vitals:   Temp (24hrs), Av.9 °F (36.6 °C), Min:97.7 °F (36.5 °C), Max:98.2 °F (36.8 °C)    Temp:  [97.7 °F (36.5 °C)-98.2 °F (36.8 °C)] 97.7 °F (36.5 °C)  HR:  [67-82] 67  Resp:  [16-18] 16  BP: (124-150)/(51-66) 127/51  SpO2:  [88 %-98 %] 88 %  Body mass index is 26.6 kg/m².     Input and Output Summary (last 24 hours):       Intake/Output Summary (Last 24 hours) at 2024 1907  Last data filed at 2024 1701  Gross per 24 hour   Intake 240 ml   Output 650 ml   Net -410 ml       Physical Exam:     General: ill appearing, no acute distress  HEENT: atraumatic, PERRLA, moist mucosa, normal pharynx, normal tonsils and adenoids, normal tongue, no fluid in sinuses  Neck: Trachea midline, no carotid bruit, no masses  Respiratory: normal chest wall expansion, CTA B, no r/r/w, no rubs  Cardiovascular: RRR, no m/r/g, Normal S1 and S2  Abdomen: Soft, non-tender, non-distended, normal bowel sounds in all quadrants, no hepatosplenomegaly, no tympany  Rectal: deferred  Musculoskeletal: Functional paraplegia integumentary: warm, dry, and pink, with no rash, purpura, or petechia  Heme/Lymph: no lymphadenopathy, no bruises  Neurological: Cranial Nerves II-XII grossly intact  Psychiatric: cooperative with normal mood, affect, and cognition      Additional Data:     Labs:    Results from last 7 days   Lab Units 24  1008 24  0522 24  0439   WBC Thousand/uL 5.71   < > 8.71   HEMOGLOBIN g/dL 13.0   < > 12.5   HEMATOCRIT % 39.2   < > 38.5   PLATELETS Thousands/uL 219   < > 257   SEGS PCT %  --   --  75   LYMPHO PCT %  --   --  10*   MONO PCT %  --   --  11   EOS PCT %  --   --  3    < > = values in this interval not displayed.     Results from last 7 days    Lab Units 12/29/24  1008 12/26/24  0439 12/25/24  1133   POTASSIUM mmol/L 3.3*   < > 3.9   CHLORIDE mmol/L 105   < > 99   CO2 mmol/L 21   < > 25   BUN mg/dL 9   < > 9   CREATININE mg/dL 0.76   < > 0.95   CALCIUM mg/dL 9.3   < > 9.9   ALK PHOS U/L  --   --  94   ALT U/L  --   --  10   AST U/L  --   --  13    < > = values in this interval not displayed.     Results from last 7 days   Lab Units 12/25/24  1133   INR  0.94       * I Have Reviewed All Lab Data Listed Above.  * Additional Pertinent Lab Tests Reviewed: All Labs For Current Hospital Admission Reviewed    Imaging:    Imaging Reports Reviewed Today Include: No new imaging  Imaging Personally Reviewed by Myself Includes: No new imaging    Recent Cultures (last 7 days):     Results from last 7 days   Lab Units 12/27/24  1435 12/25/24  1352 12/25/24  1142 12/25/24  1133   BLOOD CULTURE   --   --  No Growth After 5 Days. No Growth After 5 Days.   URINE CULTURE   --  >100,000 cfu/ml  --   --    LEGIONELLA URINARY ANTIGEN  Negative  --   --   --        Last 24 Hours Medication List:   Current Facility-Administered Medications   Medication Dose Route Frequency Provider Last Rate    acetaminophen  650 mg Oral Q6H PRN Harper Pollack PA-C      amLODIPine  10 mg Oral Daily Annemarie Mello MD      And    atorvastatin  80 mg Oral Daily With Dinner Annemarie Mello MD      baclofen  5 mg Oral TID Annemarie Mello MD      bisacodyl  10 mg Rectal Daily PRN Annemarie Mello MD      budesonide-formoterol  2 puff Inhalation BID Annemarie Mello MD      cefepime  2,000 mg Intravenous Q8H Diana Phillips DO 2,000 mg (12/30/24 1352)    clopidogrel  75 mg Oral Daily Annemarie Mello MD      cyanocobalamin  500 mcg Oral Daily Annemarie Mello MD      gabapentin  300 mg Oral TID Annemarie Mello MD      [Held by provider] gabapentin  600 mg Oral HS Annemarie Mello MD      guaiFENesin  600 mg Oral Q12H CIERA DO john RaymondFENesin  200 mg Oral Q4H PRN Diana Phillips DO       heparin (porcine)  5,000 Units Subcutaneous Q8H [facility] Annemarie Mello MD      insulin lispro  1-5 Units Subcutaneous 4x Daily (PC & HS) Annemarie Mello MD      ipratropium  0.5 mg Nebulization TID Diana Phillips DO      latanoprost  1 drop Both Eyes HS Annemarie Mello MD      levalbuterol  1.25 mg Nebulization TID Diana Phillips DO      meclizine  12.5 mg Oral Q8H PRN Annemarie Mello MD      melatonin  3 mg Oral HS PRN Annemarie Mello MD      methenamine hippurate  1 g Oral BID With Meals Annemarie Mello MD      mirtazapine  7.5 mg Oral HS Annemarie Mello MD      pantoprazole  40 mg Oral Early Morning Annemarie Mello MD      polyethylene glycol  17 g Oral BID Annemarie eMllo MD      propranolol  60 mg Oral Daily Annemarie Mello MD      senna-docusate sodium  2 tablet Oral HS Annemarie Mello MD         AM-PAC Basic Mobility:  Basic Mobility Inpatient Raw Score: 12    JH-HLM Achieved: 2: Bed activities/Dependent transfer  JH-HLM Goal: 4: Move to chair/commode    HLM Goal listed above. Continue with multidisciplinary rounding and encourage appropriate mobility to improve upon HLM goals.     Today, Patient Was Seen By: Soot Talley DO    ** Please Note: This note was completed in part utilizing [employer] software dictation.  Grammatical errors, random word insertions, spelling mistakes, and incomplete sentences may be an occasional consequence of this system secondary to software limitations, ambient noise, and hardware issues.  If you have any questions or concerns about the content, text, or information contained within the body of this dictation, please contact the provider for clarification. **

## 2024-12-31 NOTE — ASSESSMENT & PLAN NOTE
Patient has issues with chronic constipation and had multiple admissions in the past with severe abdominal pain due to sterile coral colitis and constipation.  Will place outpatient referral to colorectal surgery to discuss possible diverting ostomy in the future    Continue bowel regimen with MiraLAX and Senokot

## 2024-12-31 NOTE — DISCHARGE INSTR - AVS FIRST PAGE
Dear Mathew Rico,     It was our pleasure to care for you here at [facility].  It is our hope that we were always able to exceed the expected standards for your care during your stay.  You were hospitalized due to influenza infection as well as possible UTI..  You were cared for on the fifth floor by Soto Talley DO with the [facility] who covers for your primary care physician (PCP), Carolina Kumari MD, while you were hospitalized.  If you have any questions or concerns related to this hospitalization, you may contact us at .  For follow up as well as any medication refills, we recommend that you follow up with your primary care physician.  A registered nurse will reach out to you by phone within a few days after your discharge to answer any additional questions that you may have after going home.  However, at this time we provide for you here, the most important instructions / recommendations at discharge:     Notable Medication Adjustments -   No changes to chronic home medication  Testing Required after Discharge -   None  Important follow up information -   PCP follow-up in 1 week  Neurology follow-up as scheduled  Referral has been placed to colorectal surgery to discuss possible ostomy creation given history of recurrent fecal impaction  Other Instructions -   None  Please review this entire after visit summary as additional general instructions including medication list, appointments, activity, diet, any pertinent wound care, and other additional recommendations from your care team that may be provided for you.      Sincerely,     Soto Talley DO and Nurse Renee Kenyon

## 2024-12-31 NOTE — CASE MANAGEMENT
Case Management Discharge Planning Note    Patient name Mathew Rico  Location [facility] 5 /E5 -* MRN [id number]  : 1950 Date 2024       Current Admission Date: 2024  Current Admission Diagnosis:Sepsis (HCC)   Patient Active Problem List    Diagnosis Date Noted Date Diagnosed    History of stroke 2024     Bladder wall thickening 2024     Pain of left hip 2024     Left leg pain 2024     Dizziness 2024     Pruritus 2024     Asymptomatic bacteriuria 2024     Blurred vision, bilateral 2024     Symptoms of upper respiratory infection (URI) 06/15/2024     Chest pain 2024     Acute encephalopathy 2024     GERSON on CPAP 2024     Fall 2024     Primary hypertension 2024     Type 2 diabetes mellitus without complication, without long-term current use of insulin ([facility]) 2024     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HCC) 2024     Multiple sclerosis (HCC) 2024     Urinary retention 2024     Neck pain 2024     Vitamin B deficiency 2024     Generalized weakness 2024     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     Urinary tract infection associated with cystostomy catheter  (HCC) 02/15/2024     Fall 2023     Weakness 2023     Accidental fall from chair 2023     Constipation 2023     Chronic diastolic congestive heart failure (HCC) 2023     Hyponatremia 2023     Excessive daytime sleepiness 2022     Shortness of breath 2022     Sepsis (HCC) 2021     Pancreatic mass 2020     Pancreatic lesion 2020     Bladder stones 2019     Bladder neck contracture 2019     History of CVA (cerebrovascular accident) 10/21/2018     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HCC) 2017     Diabetic neuropathy (HCC) 2016     Neurogenic bladder 2016     Thyroid nodule 2015     Cervical spinal stenosis 2014     Generalized anxiety disorder  07/13/2014     Obstructive sleep apnea 01/04/2013     Benign colon polyp 06/19/2012     Esophageal reflux 06/19/2012     Fatty liver 06/19/2012     Glaucoma 06/19/2012     Hyperlipidemia 06/19/2012     Multiple sclerosis (HCC) 06/19/2012     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) 06/19/2012     Primary hypertension 06/11/2012       LOS (days): 6  Geometric Mean LOS (GMLOS) (days): 4.8  Days to GMLOS:-1     OBJECTIVE:  Risk of Unplanned Readmission Score: 51.49         Current admission status: Inpatient   Preferred Pharmacy:   [facility]/pharmacy #[id number] - [place], [place] - [address]  [address] [facility] [address]  Phone: [phone] Fax: [phone]    [place], [place] - [address]  [address] [address]  Phone: [phone] Fax: [phone]    [facility] [place], [place] - [address]  [facility],  [address]  [facility],  First [address] [facility] [address]  Phone: [phone] Fax: [phone]     [facility] [place] [facility] [address] [address]  Phone: [phone] Fax: [phone]    Primary Care Provider: Carolina Kumari MD    Primary Insurance: [facility]  Secondary Insurance:     DISCHARGE DETAILS:                                                    Treatment Team Recommendation: Home  Discharge Destination Plan:: Home  Transport at Discharge : [facility]     Number/Name of Dispatcher: [facility]  Transported by (Company and Unit #): [facility] S [phone]  ETA of Transport (Date): 12/31/24  ETA of Transport (Time): 1400                       Additional Comments: Patient cleared for d/c today, S transportation arranged and confirmed via 1400 with [facility].  Brother Guzman [facility], and nursing staff notified.

## 2024-12-31 NOTE — ASSESSMENT & PLAN NOTE
Lab Results   Component Value Date    HGBA1C 9.0 (H) 10/06/2024       Recent Labs     12/29/24  2044 12/30/24  0748 12/30/24  1151 12/30/24  1633   POCGLU 166* 151* 154* 115       Blood Sugar Average: Last 72 hrs:  (P) [id number]  Patient on metformin and Jardiance currently on hold  Monitor on diabetic diet, sliding scale and Accu-Cheks

## 2024-12-31 NOTE — ASSESSMENT & PLAN NOTE
Lab Results   Component Value Date    HGBA1C 9.0 (H) 10/06/2024       Recent Labs     12/30/24  1633 12/30/24  2130 12/31/24  0729 12/31/24  1116   POCGLU 115 109 176* 161*         Blood Sugar Average: Last 72 hrs:  (P) [id number]  Patient on metformin and Jardiance and resumed upon discharge

## 2024-12-31 NOTE — ASSESSMENT & PLAN NOTE
74-year-old male with PMH of MS, paraplegia, neurogenic bladder with SPT and HTN presented with fevers-likely secondary to influenza  Met sepsis criteria with fevers, tachycardia with source being flu  SPT tube change on 12/25 in the ED  Continue antibiotics with cefepime, previous cultures grew Enterobacter  Cefepime increased to 2g iv q8 day 5/5  Blood cultures negative for 72 hours  Urine culture showed mixed contaminants.   Ct chest showed bronchiolitis, secondary to the flu

## 2024-12-31 NOTE — PLAN OF CARE
Problem: PAIN - ADULT  Goal: Verbalizes/displays adequate comfort level or baseline comfort level  Description: Interventions:  - Encourage patient to monitor pain and request assistance  - Assess pain using appropriate pain scale  - Administer analgesics based on type and severity of pain and evaluate response  - Implement non-pharmacological measures as appropriate and evaluate response  - Consider cultural and social influences on pain and pain management  - Notify physician/advanced practitioner if interventions unsuccessful or patient reports new pain  12/31/2024 1233 by Renee Kenyon RN  Outcome: Adequate for Discharge  12/31/2024 0736 by Renee Kenyon RN  Outcome: Progressing     Problem: INFECTION - ADULT  Goal: Absence or prevention of progression during hospitalization  Description: INTERVENTIONS:  - Assess and monitor for signs and symptoms of infection  - Monitor lab/diagnostic results  - Monitor all insertion sites, i.e. indwelling lines, tubes, and drains  - Monitor endotracheal if appropriate and nasal secretions for changes in amount and color  - [place] appropriate cooling/warming therapies per order  - Administer medications as ordered  - Instruct and encourage patient and family to use good hand hygiene technique  - Identify and instruct in appropriate isolation precautions for identified infection/condition  12/31/2024 1233 by Renee Kenyon RN  Outcome: Adequate for Discharge  12/31/2024 0736 by Renee Kenyon RN  Outcome: Progressing  Goal: Absence of fever/infection during neutropenic period  Description: INTERVENTIONS:  - Monitor WBC    12/31/2024 1233 by Renee Kenyon RN  Outcome: Adequate for Discharge  12/31/2024 0736 by Renee Kenyon RN  Outcome: Progressing     Problem: SAFETY ADULT  Goal: Patient will remain free of falls  Description: INTERVENTIONS:  - Educate patient/family on patient safety including physical limitations  - Instruct patient to call for  assistance with activity   - Consult OT/PT to assist with strengthening/mobility   - Keep Call bell within reach  - Keep bed low and locked with side rails adjusted as appropriate  - Keep care items and personal belongings within reach  - Initiate and maintain comfort rounds  - Make Fall Risk Sign visible to staff  - Offer Toileting every 2 Hours, in advance of need  - Initiate/Maintain bedalarm  - Obtain necessary fall risk management equipment:   - Apply yellow socks and bracelet for high fall risk patients  - Consider moving patient to room near nurses station  12/31/2024 1233 by Renee Kenyon RN  Outcome: Adequate for Discharge  12/31/2024 0736 by Renee Kenyon RN  Outcome: Progressing  Goal: Maintain or return to baseline ADL function  Description: INTERVENTIONS:  -  Assess patient's ability to carry out ADLs; assess patient's baseline for ADL function and identify physical deficits which impact ability to perform ADLs (bathing, care of mouth/teeth, toileting, grooming, dressing, etc.)  - Assess/evaluate cause of self-care deficits   - Assess range of motion  - Assess patient's mobility; develop plan if impaired  - Assess patient's need for assistive devices and provide as appropriate  - Encourage maximum independence but intervene and supervise when necessary  - Involve family in performance of ADLs  - Assess for home care needs following discharge   - Consider OT consult to assist with ADL evaluation and planning for discharge  - Provide patient education as appropriate  12/31/2024 1233 by Renee Kenyon RN  Outcome: Adequate for Discharge  12/31/2024 0736 by Renee Kenyon RN  Outcome: Progressing  Goal: Maintains/Returns to pre admission functional level  Description: INTERVENTIONS:  - Perform AM-PAC 6 Click Basic Mobility/ Daily Activity assessment daily.  - Set and communicate daily mobility goal to care team and patient/family/caregiver.   - Collaborate with rehabilitation services on  mobility goals if consulted  - Out of bed for toileting  - Record patient progress and toleration of activity level   12/31/2024 1233 by Renee Kenyon RN  Outcome: Adequate for Discharge  12/31/2024 0736 by Renee Kenyon RN  Outcome: Progressing     Problem: DISCHARGE PLANNING  Goal: Discharge to home or other facility with appropriate resources  Description: INTERVENTIONS:  - Identify barriers to discharge w/patient and caregiver  - Arrange for needed discharge resources and transportation as appropriate  - Identify discharge learning needs (meds, wound care, etc.)  - Arrange for interpretive services to assist at discharge as needed  - Refer to Case Management Department for coordinating discharge planning if the patient needs post-hospital services based on physician/advanced practitioner order or complex needs related to functional status, cognitive ability, or social support system  12/31/2024 1233 by Renee Kenyon RN  Outcome: Adequate for Discharge  12/31/2024 0736 by Renee Kenyon RN  Outcome: Progressing     Problem: Knowledge Deficit  Goal: Patient/family/caregiver demonstrates understanding of disease process, treatment plan, medications, and discharge instructions  Description: Complete learning assessment and assess knowledge base.  Interventions:  - Provide teaching at level of understanding  - Provide teaching via preferred learning methods  12/31/2024 1233 by Renee Kenyon RN  Outcome: Adequate for Discharge  12/31/2024 0736 by Renee Kenyon RN  Outcome: Progressing     Problem: PHYSICAL THERAPY ADULT  Goal: Performs mobility at highest level of function for planned discharge setting.  See evaluation for individualized goals.  Description:   Outcome: Adequate for Discharge     Problem: OCCUPATIONAL THERAPY ADULT  Goal: Performs self-care activities at highest level of function for planned discharge setting.  See evaluation for individualized goals.  Description:   Outcome:  Adequate for Discharge     Problem: SLP ADULT - SWALLOWING, IMPAIRED  Goal: Initial SLP swallow eval performed  Outcome: Adequate for Discharge  Goal: Advance to least restrictive diet without signs or symptoms of aspiration for planned discharge setting.  See evaluation for individualized goals.  Description: Patient will:    1. Tolerate  with no S/S of  across meals .   2. Demonstrate effective  .  3. Use  .    Outcome: Adequate for Discharge     Problem: SLP ADULT - COMMUNICATION, IMPAIRED  Goal: Initial communication eval performed  Outcome: Adequate for Discharge  Goal: Demonstrates communication skills at highest level of function for planned discharge setting.  See evaluation for individualized goals.  Description: Patient will be able to:    1. Increase ability to  with % accuracy .   2. Demostrate reading comprehension at  with % accuracy .  3. Increase ability to write  with % .  4. Demonstrate auditory comprehension of  with % accuracy .  5.  with % accuracy .    Outcome: Adequate for Discharge     Problem: SLP ADULT - THOUGHT PROCESS, DISTURBED  Goal: Initial thought process eval performed  Outcome: Adequate for Discharge  Goal: Demonstrates cognitive skills at highest level of function for planned discharge setting.   See evaluation for individualized goals.  Description: Patient will be able to demonstrate:    1. Orientation to  consistently.  2 . Increase recall of salient long term information including biographical information with % accuracy .  3. . Increase recall of salient recent events with % accuracy .  4. Increase short term memory for new information across days to % accuracy .  5. Increase recall of strategies to maximize performance in cognitive language tasks to % accuracy .  6.  Ability to solve simple problems in ADL tasks with % accuracy .  7.  Ability to solve complex/advanced problems in ADL tasks and high level cognitive activities with % accuracy   Outcome: Adequate for Discharge      Problem: Prexisting or High Potential for Compromised Skin Integrity  Goal: Skin integrity is maintained or improved  Description: INTERVENTIONS:  - Identify patients at risk for skin breakdown  - Assess and monitor skin integrity  - Assess and monitor nutrition and hydration status  - Monitor labs   - Assess for incontinence   - Turn and reposition patient  - Assist with mobility/ambulation  - Relieve pressure over bony prominences  - Avoid friction and shearing  - Provide appropriate hygiene as needed including keeping skin clean and dry  - Evaluate need for skin moisturizer/barrier cream  - Collaborate with interdisciplinary team   - Patient/family teaching  - Consider wound care consult   12/31/2024 1233 by Renee Kenyon RN  Outcome: Adequate for Discharge  12/31/2024 0736 by Renee Kenyon RN  Outcome: Progressing

## 2024-12-31 NOTE — UTILIZATION REVIEW
NOTIFICATION OF ADMISSION DISCHARGE   This is a Notification of Discharge from [facility]. Please be advised that this patient has been discharge from our facility. Below you will find the admission and discharge date and time including the patient’s disposition.   UTILIZATION REVIEW CONTACT:  Marsha Garcia  Utilization   Network Utilization Review Department  Phone: [phone] x carefully listen to the prompts. All voicemails are confidential.  Email: [email or web address]     ADMISSION INFORMATION  PRESENTATION DATE: 12/25/2024 10:50 AM  OBERVATION ADMISSION DATE: N/A  INPATIENT ADMISSION DATE: 12/25/24  2:17 PM   DISCHARGE DATE: 12/31/2024  2:10 PM   DISPOSITION:Home with Home Health Care    Network Utilization Review Department  ATTENTION: Please call with any questions or concerns to [phone] and carefully listen to the prompts so that you are directed to the right person. All voicemails are confidential.   For Discharge needs, contact Care Management DC Support Team at [phone] opt. 2  Send all requests for admission clinical reviews, approved or denied determinations and any other requests to dedicated fax number below belonging to the campus where the patient is receiving treatment. List of dedicated fax numbers for the Facilities:  FACILITY NAME UR FAX NUMBER   ADMISSION DENIALS (Administrative/Medical Necessity) [phone]   DISCHARGE SUPPORT TEAM ([facility]) [phone]   PARENT CHILD HEALTH (Maternity/NICU/Pediatrics) [phone]   [facility] [phone]   [facility] [phone]   [facility] [phone]   [facility] [phone]   [facility] [phone]   [facility] [phone]   [facility] [phone]   [facility]  [phone]   [facility] [phone]   [facility] [phone]   [facility] [phone]   [facility] [phone]

## 2024-12-31 NOTE — DISCHARGE SUMMARY
Discharge Summary - Hospitalist   Name: Mathew Rico 74 y.o. male I MRN: [id number]  Unit/Bed#: E5 -01 I Date of Admission: 12/25/2024   Date of Service: 12/31/2024 I Hospital Day: 6         Admitting Provider:  Annemarie Mello MD  Discharge Provider:  Soto Talley DO  Admission Date: 12/25/2024       Discharge Date: 12/31/24   LOS: 6  Primary Care Physician at Discharge: Carolina Kumari [phone]    HOSPITAL COURSE:  Mathew Rico is a 74 y.o. male who presented altered mental status, fever.  The patient has a past medical history of multiple sclerosis not on therapy, this has resulted in neurogenic bladder and bowels.  He has a chronic suprapubic tube in place.    Ischial he presented with headache and fevers.  He had multiple SIRS criteria on presentation being febrile tachycardic and having elevated lactic acid.    The patient was found to have influenza infection.  He was out of the window for treatment and was not initiated on Tamiflu.  Given previous history of catheter associated urinary tract infection he was urgently evaluated in consultation by the urology service with suprapubic tube exchange.  He was started on cefepime and received a 5-day course of treatment.    The patient remained stable in general medical floor.  He remained stable on room air and subsequently was discharged home in stable condition to his family members.    At the time of discharge the patient was tolerating oral diet they were without acute complaint and they were medically cleared for discharge.  All questions were answered the patient's satisfaction and they were in agreement with the discharge plan.    DISCHARGE DIAGNOSES  * Sepsis (HCC)  Assessment & Plan  74-year-old male with PMH of MS, paraplegia, neurogenic bladder with SPT and HTN presented with fevers-likely secondary to influenza  Met sepsis criteria with fevers, tachycardia with source being flu  SPT tube change on 12/25 in the ED  Continue  antibiotics with cefepime, previous cultures grew Enterobacter  Cefepime increased to 2g iv q8 day 5/5  Blood cultures negative for 72 hours  Urine culture showed mixed contaminants.   Ct chest showed bronchiolitis, secondary to the flu    History of stroke  Assessment & Plan  Continue Plavix and statin    Multiple sclerosis ([facility])  Assessment & Plan  Patient has chronic urinary retention status post suprapubic catheter.  Continue follow-up with neurology as previously recommended  Has neurogenic bladder and bowels    Type 2 diabetes mellitus without complication, without long-term current use of insulin ([facility])  Assessment & Plan  Lab Results   Component Value Date    HGBA1C 9.0 (H) 10/06/2024       Recent Labs     12/26/24  1754 12/26/24  2106 12/27/24  0732 12/27/24  1121   POCGLU 115 108 94 112       Blood Sugar Average: Last 72 hrs:  (P) 112.6929269015653174  Resume home regimen at discharge      Urinary tract infection associated with cystostomy catheter  ([facility])  Assessment & Plan  As above under sepsis  Present on admission    Stercoral colitis  Assessment & Plan  Patient has issues with chronic constipation and had multiple admissions in the past with severe abdominal pain due to sterile coral colitis and constipation.  Will place outpatient referral to colorectal surgery to discuss possible diverting ostomy in the future    Continue bowel regimen with MiraLAX and Senokot    Type 2 diabetes mellitus with neuropathy ([facility])  Assessment & Plan  Lab Results   Component Value Date    HGBA1C 9.0 (H) 10/06/2024       Recent Labs     12/30/24  1633 12/30/24  2130 12/31/24  0729 12/31/24  1116   POCGLU 115 109 176* 161*         Blood Sugar Average: Last 72 hrs:  (P) 143.1031592048946720  Patient on metformin and Jardiance and resumed upon discharge    Primary hypertension  Assessment & Plan  Continue amlodipine, and propranolol with BP hold parameters  Blood pressure currently controlled    Neurogenic bladder  Assessment  & Plan  Patient has chronic suprapubic catheter, changed during this admission in the ED.  Appreciate urology input  Follow outpatient with urology, has SPT tube changes every 28 days      CONSULTING PROVIDERS   IP CONSULT TO CASE MANAGEMENT  IP CONSULT TO UROLOGY  IP CONSULT TO PHARMACY    PROCEDURES PERFORMED  * No surgery found *    RADIOLOGY RESULTS  CT chest wo contrast  Result Date: 12/27/2024  Impression: 1.  Mildly motion degraded exam. 2.  No lobar consolidation. Multifocal bronchial wall thickening as above which could be seen with small airways disease/bronchiolitis. 3.  See comments above for full analysis. Workstation performed: [id number]     CT abdomen pelvis w contrast  Result Date: 12/25/2024  Impression: Diffuse urinary bladder wall thickening consistent with cystitis. Cage catheter terminates within the urinary bladder which is predominantly air-filled. Persistent rectal wall thickening, however compared to prior exam there is decreased stool burden within the rectum. Examination marked in EPIC for immediate notification. Workstation performed: [id number]     XR chest 1 view portable  Result Date: 12/25/2024  Impression: No acute cardiopulmonary disease. Workstation performed: [id number]       LABS  Results from last 7 days   Lab Units 12/31/24  0453 12/29/24  1008 12/28/24  0522 12/26/24  0439 12/25/24  1453 12/25/24  1133   WBC Thousand/uL 7.98 5.71 6.07 8.71  --  10.09   HEMOGLOBIN g/dL 15.7 13.0 13.0 12.5  --  14.8   HEMATOCRIT % 47.8 39.2 40.4 38.5  --  46.6   MCV fL 81* 83 83 84  --  86   PLATELETS Thousands/uL 249 219 245 257 332 309   INR   --   --   --   --   --  0.94     Results from last 7 days   Lab Units 12/31/24  0453 12/29/24  1008 12/28/24  0522 12/26/24  0439 12/25/24  1133   SODIUM mmol/L 136 134* 132* 133* 135   POTASSIUM mmol/L 3.6 3.3* 3.4* 3.5 3.9   CHLORIDE mmol/L 106 105 103 105 99   CO2 mmol/L 20* 21 20* 20* 25   BUN mg/dL 11 9 10 12 9   CREATININE mg/dL 0.68 0.76 0.75 0.76  0.95   CALCIUM mg/dL 9.7 9.3 9.0 8.6 9.9   ALBUMIN g/dL  --   --   --   --  4.0   TOTAL BILIRUBIN mg/dL  --   --   --   --  0.49   ALK PHOS U/L  --   --   --   --  94   ALT U/L  --   --   --   --  10   AST U/L  --   --   --   --  13   EGFR ml/min/1.73sq m 93 89 89 89 77   GLUCOSE RANDOM mg/dL 159* 137 128 127 106                  Results from last 7 days   Lab Units 12/31/24  1116 12/31/24  0729 12/30/24  2130 12/30/24  1633 12/30/24  1151 12/30/24  0748 12/29/24  2044 12/29/24  1557 12/29/24  1125 12/29/24  0810   POC GLUCOSE mg/dl 161* 176* 109 115 154* 151* 166* 139 145* 127             Results from last 7 days   Lab Units 12/28/24  0522 12/26/24  0439 12/25/24  1452 12/25/24  1133   LACTIC ACID mmol/L  --   --  1.4 2.9*   PROCALCITONIN ng/ml 0.10   < >  --  0.10    < > = values in this interval not displayed.           Cultures:   Results from last 7 days   Lab Units 12/25/24  1352   COLOR UA  Yellow   CLARITY UA  Cloudy   SPEC GRAV UA  1.025   PH UA  5.5   LEUKOCYTES UA  Small*   NITRITE UA  Positive*   GLUCOSE UA mg/dl 500 (1/2%)*   KETONES UA mg/dl Trace*   BILIRUBIN UA  Negative   BLOOD UA  Moderate*      Results from last 7 days   Lab Units 12/25/24  1352   RBC UA /hpf Innumerable*   WBC UA /hpf Innumerable*   EPITHELIAL CELLS WET PREP /hpf None Seen   BACTERIA UA /hpf Moderate*      Results from last 7 days   Lab Units 12/27/24  1435 12/25/24  1352 12/25/24  1142 12/25/24  1133   BLOOD CULTURE   --   --  No Growth After 5 Days. No Growth After 5 Days.   URINE CULTURE   --  >100,000 cfu/ml  --   --    LEGIONELLA URINARY ANTIGEN  Negative  --   --   --    STREP PNEUMONIAE ANTIGEN, URINE  Negative  --   --   --      Results from last 7 days   Lab Units 12/28/24  2999   SARS-COV-2  Not Detected   RESPIRATORY SYNCYTIAL VIRUS  Not Detected         PHYSICAL EXAM:  Vitals:   Blood Pressure: 107/86 (12/31/24 0728)  Pulse: 99 (12/31/24 0728)  Temperature: 97.8 °F (36.6 °C) (12/30/24 2347)  Temp Source: Oral  (12/30/24 1529)  Respirations: 18 (12/31/24 0728)  Weight - Scale: 71 kg (156 lb 8.4 oz) (12/31/24 5253)  SpO2: 91 % (12/31/24 0759)      General: well appearing, no acute distress  HEENT: atraumatic, PERRLA, moist mucosa, normal pharynx, normal tonsils and adenoids, normal tongue, no fluid in sinuses  Neck: Trachea midline, no carotid bruit, no masses  Respiratory: normal chest wall expansion, CTA B  Cardiovascular: RRR, no m/r/g, Normal S1 and S2  Abdomen: Soft, non-tender, non-distended, normal bowel sounds in all quadrants, no hepatosplenomegaly, no tympany  Rectal: deferred  Musculoskeletal: Functional paraplegia secondary to MS  Integumentary: warm, dry, and pink, with no visible rash, purpura, or petechia  Heme/Lymph: no lymphadenopathy, no bruises  Neurological: Cranial Nerves II-XII grossly intact  Psychiatric: cooperative with normal mood, affect, and cognition       Discharge Disposition: Home with Home Health Care    AM-PAC Basic Mobility:  Basic Mobility Inpatient Raw Score: 8    JH-HLM Achieved: 1: Laying in bed  JH-HLM Goal: 3: Sit at edge of bed    HLM Goal listed above. Continue with ongoing physical therapy and encourage appropriate mobility to improve upon HLM goals.      Test Results Pending at Discharge:           Medications   Summary of Medication Adjustments made as a result of this hospitalization: See discharge summary and AVS for medication changes  Medication Dosing Tapers - Please refer to Discharge Medication List for details on any medication dosing tapers (if applicable to patient).  Discharge Medication List: See after visit summary for reconciled discharge medications.     Diet restrictions:         Diet Orders   (From admission, onward)                 Start     Ordered    12/25/24 5867  Diet Cardiovascular; Sodium 2 GM; Consistent Carbohydrate Diet Level 2 (5 carb servings/75 grams CHO/meal)  Diet effective now        References:    Adult Nutrition Support Algorithm    RD  Therapeutic Diet Order Protocol   Question Answer Comment   Diet Type Cardiovascular    Cardiac Sodium 2 GM    Other Restriction(s): Consistent Carbohydrate Diet Level 2 (5 carb servings/75 grams CHO/meal)    Special Instructions Aspiration precautions    RD to adjust diet per protocol? Yes        12/25/24 4491                  Activity restrictions: No strenuous activity  Discharge Condition: good    Outpatient Follow-Up and Discharge Instructions  See after visit summary section titled Discharge Instructions for information provided to patient and family.      Code Status: Level 1 - Full Code  Discharge Statement   I spent 86 minutes discharging the patient. This time was spent on the day of discharge. Greater than 50% of total time was spent with the patient and / or family counseling and / or coordination of care.    ** Please Note: This note was completed in part utilizing [employer] Dragon [employer] Software.  Grammatical errors, random word insertions, spelling mistakes, and incomplete sentences may be an occasional consequence of this system secondary to software limitations, ambient noise, and hardware issues.  If you have any questions or concerns about the content, text, or information contained within the body of this dictation, please contact the provider for clarification.**

## 2024-12-31 NOTE — ASSESSMENT & PLAN NOTE
Patient has chronic urinary retention status post suprapubic catheter.  Continue follow-up with neurology as previously recommended  Has neurogenic bladder and bowels

## 2024-12-31 NOTE — PLAN OF CARE
Problem: PAIN - ADULT  Goal: Verbalizes/displays adequate comfort level or baseline comfort level  Description: Interventions:  - Encourage patient to monitor pain and request assistance  - Assess pain using appropriate pain scale  - Administer analgesics based on type and severity of pain and evaluate response  - Implement non-pharmacological measures as appropriate and evaluate response  - Consider cultural and social influences on pain and pain management  - Notify physician/advanced practitioner if interventions unsuccessful or patient reports new pain  Outcome: Progressing     Problem: INFECTION - ADULT  Goal: Absence or prevention of progression during hospitalization  Description: INTERVENTIONS:  - Assess and monitor for signs and symptoms of infection  - Monitor lab/diagnostic results  - Monitor all insertion sites, i.e. indwelling lines, tubes, and drains  - Monitor endotracheal if appropriate and nasal secretions for changes in amount and color  - [place] appropriate cooling/warming therapies per order  - Administer medications as ordered  - Instruct and encourage patient and family to use good hand hygiene technique  - Identify and instruct in appropriate isolation precautions for identified infection/condition  Outcome: Progressing  Goal: Absence of fever/infection during neutropenic period  Description: INTERVENTIONS:  - Monitor WBC    Outcome: Progressing     Problem: SAFETY ADULT  Goal: Patient will remain free of falls  Description: INTERVENTIONS:  - Educate patient/family on patient safety including physical limitations  - Instruct patient to call for assistance with activity   - Consult OT/PT to assist with strengthening/mobility   - Keep Call bell within reach  - Keep bed low and locked with side rails adjusted as appropriate  - Keep care items and personal belongings within reach  - Initiate and maintain comfort rounds  - Make Fall Risk Sign visible to staff  - Offer Toileting every 2 Hours,  in advance of need  - Initiate/Maintain bedalarm  - Obtain necessary fall risk management equipment:   - Apply yellow socks and bracelet for high fall risk patients  - Consider moving patient to room near nurses station  Outcome: Progressing  Goal: Maintain or return to baseline ADL function  Description: INTERVENTIONS:  -  Assess patient's ability to carry out ADLs; assess patient's baseline for ADL function and identify physical deficits which impact ability to perform ADLs (bathing, care of mouth/teeth, toileting, grooming, dressing, etc.)  - Assess/evaluate cause of self-care deficits   - Assess range of motion  - Assess patient's mobility; develop plan if impaired  - Assess patient's need for assistive devices and provide as appropriate  - Encourage maximum independence but intervene and supervise when necessary  - Involve family in performance of ADLs  - Assess for home care needs following discharge   - Consider OT consult to assist with ADL evaluation and planning for discharge  - Provide patient education as appropriate  Outcome: Progressing  Goal: Maintains/Returns to pre admission functional level  Description: INTERVENTIONS:  - Perform AM-PAC 6 Click Basic Mobility/ Daily Activity assessment daily.  - Set and communicate daily mobility goal to care team and patient/family/caregiver.   - Collaborate with rehabilitation services on mobility goals if consulted  - Out of bed for toileting  - Record patient progress and toleration of activity level   Outcome: Progressing     Problem: DISCHARGE PLANNING  Goal: Discharge to home or other facility with appropriate resources  Description: INTERVENTIONS:  - Identify barriers to discharge w/patient and caregiver  - Arrange for needed discharge resources and transportation as appropriate  - Identify discharge learning needs (meds, wound care, etc.)  - Arrange for interpretive services to assist at discharge as needed  - Refer to Case Management Department for  coordinating discharge planning if the patient needs post-hospital services based on physician/advanced practitioner order or complex needs related to functional status, cognitive ability, or social support system  Outcome: Progressing     Problem: Knowledge Deficit  Goal: Patient/family/caregiver demonstrates understanding of disease process, treatment plan, medications, and discharge instructions  Description: Complete learning assessment and assess knowledge base.  Interventions:  - Provide teaching at level of understanding  - Provide teaching via preferred learning methods  Outcome: Progressing     Problem: Prexisting or High Potential for Compromised Skin Integrity  Goal: Skin integrity is maintained or improved  Description: INTERVENTIONS:  - Identify patients at risk for skin breakdown  - Assess and monitor skin integrity  - Assess and monitor nutrition and hydration status  - Monitor labs   - Assess for incontinence   - Turn and reposition patient  - Assist with mobility/ambulation  - Relieve pressure over bony prominences  - Avoid friction and shearing  - Provide appropriate hygiene as needed including keeping skin clean and dry  - Evaluate need for skin moisturizer/barrier cream  - Collaborate with interdisciplinary team   - Patient/family teaching  - Consider wound care consult   Outcome: Progressing

## 2024-12-31 NOTE — ASSESSMENT & PLAN NOTE
74-year-old male with PMH of MS, paraplegia, neurogenic bladder with SPT and HTN presented with fevers-likely secondary to influenza  Met sepsis criteria with fevers, tachycardia with source being flu  SPT tube change on 12/25 in the ED  Continue antibiotics with cefepime, previous cultures grew Enterobacter  Cefepime increased to 2g iv q8 day 4/5  Blood cultures negative for 72 hours  Urine culture showed mixed contaminants.   Ct chest showed bronchiolitis, secondary to the flu

## 2024-12-31 NOTE — PLAN OF CARE
Problem: PAIN - ADULT  Goal: Verbalizes/displays adequate comfort level or baseline comfort level  Description: Interventions:  - Encourage patient to monitor pain and request assistance  - Assess pain using appropriate pain scale  - Administer analgesics based on type and severity of pain and evaluate response  - Implement non-pharmacological measures as appropriate and evaluate response  - Consider cultural and social influences on pain and pain management  - Notify physician/advanced practitioner if interventions unsuccessful or patient reports new pain  Outcome: Progressing     Problem: INFECTION - ADULT  Goal: Absence or prevention of progression during hospitalization  Description: INTERVENTIONS:  - Assess and monitor for signs and symptoms of infection  - Monitor lab/diagnostic results  - Monitor all insertion sites, i.e. indwelling lines, tubes, and drains  - Monitor endotracheal if appropriate and nasal secretions for changes in amount and color  - [place] appropriate cooling/warming therapies per order  - Administer medications as ordered  - Instruct and encourage patient and family to use good hand hygiene technique  - Identify and instruct in appropriate isolation precautions for identified infection/condition  Outcome: Progressing  Goal: Absence of fever/infection during neutropenic period  Description: INTERVENTIONS:  - Monitor WBC    Outcome: Progressing     Problem: SAFETY ADULT  Goal: Patient will remain free of falls  Description: INTERVENTIONS:  - Educate patient/family on patient safety including physical limitations  - Instruct patient to call for assistance with activity   - Consult OT/PT to assist with strengthening/mobility   - Keep Call bell within reach  - Keep bed low and locked with side rails adjusted as appropriate  - Keep care items and personal belongings within reach  - Initiate and maintain comfort rounds  - Make Fall Risk Sign visible to staff  - Offer Toileting every 2 Hours,  in advance of need  - Initiate/Maintain bedalarm  - Obtain necessary fall risk management equipment:   - Apply yellow socks and bracelet for high fall risk patients  - Consider moving patient to room near nurses station  Outcome: Progressing  Goal: Maintain or return to baseline ADL function  Description: INTERVENTIONS:  -  Assess patient's ability to carry out ADLs; assess patient's baseline for ADL function and identify physical deficits which impact ability to perform ADLs (bathing, care of mouth/teeth, toileting, grooming, dressing, etc.)  - Assess/evaluate cause of self-care deficits   - Assess range of motion  - Assess patient's mobility; develop plan if impaired  - Assess patient's need for assistive devices and provide as appropriate  - Encourage maximum independence but intervene and supervise when necessary  - Involve family in performance of ADLs  - Assess for home care needs following discharge   - Consider OT consult to assist with ADL evaluation and planning for discharge  - Provide patient education as appropriate  Outcome: Progressing  Goal: Maintains/Returns to pre admission functional level  Description: INTERVENTIONS:  - Perform AM-PAC 6 Click Basic Mobility/ Daily Activity assessment daily.  - Set and communicate daily mobility goal to care team and patient/family/caregiver.   - Collaborate with rehabilitation services on mobility goals if consulted  - Out of bed for toileting  - Record patient progress and toleration of activity level   Outcome: Progressing     Problem: DISCHARGE PLANNING  Goal: Discharge to home or other facility with appropriate resources  Description: INTERVENTIONS:  - Identify barriers to discharge w/patient and caregiver  - Arrange for needed discharge resources and transportation as appropriate  - Identify discharge learning needs (meds, wound care, etc.)  - Arrange for interpretive services to assist at discharge as needed  - Refer to Case Management Department for  coordinating discharge planning if the patient needs post-hospital services based on physician/advanced practitioner order or complex needs related to functional status, cognitive ability, or social support system  Outcome: Progressing     Problem: Knowledge Deficit  Goal: Patient/family/caregiver demonstrates understanding of disease process, treatment plan, medications, and discharge instructions  Description: Complete learning assessment and assess knowledge base.  Interventions:  - Provide teaching at level of understanding  - Provide teaching via preferred learning methods  Outcome: Progressing     Problem: Prexisting or High Potential for Compromised Skin Integrity  Goal: Skin integrity is maintained or improved  Description: INTERVENTIONS:  - Identify patients at risk for skin breakdown  - Assess and monitor skin integrity  - Assess and monitor nutrition and hydration status  - Monitor labs   - Assess for incontinence   - Turn and reposition patient  - Assist with mobility/ambulation  - Relieve pressure over bony prominences  - Avoid friction and shearing  - Provide appropriate hygiene as needed including keeping skin clean and dry  - Evaluate need for skin moisturizer/barrier cream  - Collaborate with interdisciplinary team   - Patient/family teaching  - Consider wound care consult   Outcome: Progressing

## 2025-01-08 ENCOUNTER — TELEPHONE (OUTPATIENT)
Dept: UROLOGY | Facility: CLINIC | Age: 75
End: 2025-01-08

## 2025-01-08 NOTE — TELEPHONE ENCOUNTER
Message left for patient that nurse visit on 1/20/25 at 11:30 has been canceled and rescheduled to 1/23/25 at 1:00.  Will notify [facility] as well.

## 2025-01-17 ENCOUNTER — TELEPHONE (OUTPATIENT)
Age: 75
End: 2025-01-17

## 2025-01-20 ENCOUNTER — APPOINTMENT (EMERGENCY)
Dept: CT IMAGING | Facility: HOSPITAL | Age: 75
DRG: 699 | End: 2025-01-20
Payer: MEDICARE

## 2025-01-20 ENCOUNTER — HOSPITAL ENCOUNTER (INPATIENT)
Facility: HOSPITAL | Age: 75
LOS: 2 days | Discharge: HOME HEALTH | DRG: 699 | End: 2025-01-22
Attending: EMERGENCY MEDICINE | Admitting: HOSPITALIST
Payer: MEDICARE

## 2025-01-20 DIAGNOSIS — N39.0 UTI (URINARY TRACT INFECTION): ICD-10-CM

## 2025-01-20 DIAGNOSIS — N17.9 AKI (ACUTE KIDNEY INJURY) (HCC): Primary | ICD-10-CM

## 2025-01-20 DIAGNOSIS — R10.9 ABDOMINAL PAIN: ICD-10-CM

## 2025-01-20 DIAGNOSIS — T83.091A OBSTRUCTION OF FOLEY CATHETER, INITIAL ENCOUNTER (HCC): ICD-10-CM

## 2025-01-20 PROBLEM — N13.9 URINARY OBSTRUCTION: Status: ACTIVE | Noted: 2025-01-20

## 2025-01-20 LAB
2HR DELTA HS TROPONIN: -1 NG/L
ALBUMIN SERPL BCG-MCNC: 4.1 G/DL (ref 3.5–5)
ALP SERPL-CCNC: 85 U/L (ref 34–104)
ALT SERPL W P-5'-P-CCNC: 8 U/L (ref 7–52)
ANION GAP SERPL CALCULATED.3IONS-SCNC: 12 MMOL/L (ref 4–13)
AST SERPL W P-5'-P-CCNC: 15 U/L (ref 13–39)
ATRIAL RATE: 84 BPM
ATRIAL RATE: 93 BPM
BACTERIA UR QL AUTO: ABNORMAL /HPF
BASOPHILS # BLD AUTO: 0.05 THOUSANDS/ΜL (ref 0–0.1)
BASOPHILS NFR BLD AUTO: 1 % (ref 0–1)
BILIRUB SERPL-MCNC: 0.57 MG/DL (ref 0.2–1)
BILIRUB UR QL STRIP: NEGATIVE
BUDDING YEAST: PRESENT
BUN SERPL-MCNC: 23 MG/DL (ref 5–25)
CALCIUM SERPL-MCNC: 9.9 MG/DL (ref 8.4–10.2)
CARDIAC TROPONIN I PNL SERPL HS: 4 NG/L (ref ?–50)
CARDIAC TROPONIN I PNL SERPL HS: 5 NG/L (ref ?–50)
CHLORIDE SERPL-SCNC: 102 MMOL/L (ref 96–108)
CLARITY UR: ABNORMAL
CO2 SERPL-SCNC: 20 MMOL/L (ref 21–32)
COLOR UR: YELLOW
CREAT SERPL-MCNC: 1.33 MG/DL (ref 0.6–1.3)
EOSINOPHIL # BLD AUTO: 0.26 THOUSAND/ΜL (ref 0–0.61)
EOSINOPHIL NFR BLD AUTO: 2 % (ref 0–6)
ERYTHROCYTE [DISTWIDTH] IN BLOOD BY AUTOMATED COUNT: 15.7 % (ref 11.6–15.1)
GFR SERPL CREATININE-BSD FRML MDRD: 51 ML/MIN/1.73SQ M
GLUCOSE SERPL-MCNC: 118 MG/DL (ref 65–140)
GLUCOSE UR STRIP-MCNC: ABNORMAL MG/DL
HCT VFR BLD AUTO: 48.8 % (ref 36.5–49.3)
HGB BLD-MCNC: 15.4 G/DL (ref 12–17)
HGB UR QL STRIP.AUTO: ABNORMAL
HYPHAE YEAST: PRESENT
IMM GRANULOCYTES # BLD AUTO: 0.05 THOUSAND/UL (ref 0–0.2)
IMM GRANULOCYTES NFR BLD AUTO: 1 % (ref 0–2)
KETONES UR STRIP-MCNC: ABNORMAL MG/DL
LACTATE SERPL-SCNC: 1.6 MMOL/L (ref 0.5–2)
LEUKOCYTE ESTERASE UR QL STRIP: ABNORMAL
LIPASE SERPL-CCNC: 19 U/L (ref 11–82)
LYMPHOCYTES # BLD AUTO: 2.05 THOUSANDS/ΜL (ref 0.6–4.47)
LYMPHOCYTES NFR BLD AUTO: 19 % (ref 14–44)
MCH RBC QN AUTO: 26.3 PG (ref 26.8–34.3)
MCHC RBC AUTO-ENTMCNC: 31.6 G/DL (ref 31.4–37.4)
MCV RBC AUTO: 83 FL (ref 82–98)
MONOCYTES # BLD AUTO: 0.73 THOUSAND/ΜL (ref 0.17–1.22)
MONOCYTES NFR BLD AUTO: 7 % (ref 4–12)
NEUTROPHILS # BLD AUTO: 7.65 THOUSANDS/ΜL (ref 1.85–7.62)
NEUTS SEG NFR BLD AUTO: 70 % (ref 43–75)
NITRITE UR QL STRIP: NEGATIVE
NON-SQ EPI CELLS URNS QL MICRO: ABNORMAL /HPF
NRBC BLD AUTO-RTO: 0 /100 WBCS
P AXIS: 50 DEGREES
P AXIS: 58 DEGREES
PH UR STRIP.AUTO: 6.5 [PH]
PLATELET # BLD AUTO: 289 THOUSANDS/UL (ref 149–390)
PMV BLD AUTO: 8.8 FL (ref 8.9–12.7)
POTASSIUM SERPL-SCNC: 4 MMOL/L (ref 3.5–5.3)
PR INTERVAL: 158 MS
PR INTERVAL: 166 MS
PROT SERPL-MCNC: 8.1 G/DL (ref 6.4–8.4)
PROT UR STRIP-MCNC: ABNORMAL MG/DL
QRS AXIS: 41 DEGREES
QRS AXIS: 43 DEGREES
QRSD INTERVAL: 94 MS
QRSD INTERVAL: 94 MS
QT INTERVAL: 352 MS
QT INTERVAL: 366 MS
QTC INTERVAL: 432 MS
QTC INTERVAL: 437 MS
RBC # BLD AUTO: 5.85 MILLION/UL (ref 3.88–5.62)
RBC #/AREA URNS AUTO: ABNORMAL /HPF
SODIUM SERPL-SCNC: 134 MMOL/L (ref 135–147)
SP GR UR STRIP.AUTO: 1.02 (ref 1–1.03)
T WAVE AXIS: 23 DEGREES
T WAVE AXIS: 28 DEGREES
UROBILINOGEN UR STRIP-ACNC: <2 MG/DL
VENTRICULAR RATE: 84 BPM
VENTRICULAR RATE: 93 BPM
WBC # BLD AUTO: 10.79 THOUSAND/UL (ref 4.31–10.16)
WBC #/AREA URNS AUTO: ABNORMAL /HPF

## 2025-01-20 PROCEDURE — 81001 URINALYSIS AUTO W/SCOPE: CPT | Performed by: EMERGENCY MEDICINE

## 2025-01-20 PROCEDURE — 87086 URINE CULTURE/COLONY COUNT: CPT | Performed by: EMERGENCY MEDICINE

## 2025-01-20 PROCEDURE — 93010 ELECTROCARDIOGRAM REPORT: CPT | Performed by: INTERNAL MEDICINE

## 2025-01-20 PROCEDURE — 93005 ELECTROCARDIOGRAM TRACING: CPT

## 2025-01-20 PROCEDURE — 85025 COMPLETE CBC W/AUTO DIFF WBC: CPT | Performed by: EMERGENCY MEDICINE

## 2025-01-20 PROCEDURE — 99284 EMERGENCY DEPT VISIT MOD MDM: CPT

## 2025-01-20 PROCEDURE — 99285 EMERGENCY DEPT VISIT HI MDM: CPT | Performed by: EMERGENCY MEDICINE

## 2025-01-20 PROCEDURE — 83605 ASSAY OF LACTIC ACID: CPT | Performed by: EMERGENCY MEDICINE

## 2025-01-20 PROCEDURE — 96360 HYDRATION IV INFUSION INIT: CPT

## 2025-01-20 PROCEDURE — 74177 CT ABD & PELVIS W/CONTRAST: CPT

## 2025-01-20 PROCEDURE — 83690 ASSAY OF LIPASE: CPT | Performed by: EMERGENCY MEDICINE

## 2025-01-20 PROCEDURE — 36415 COLL VENOUS BLD VENIPUNCTURE: CPT | Performed by: EMERGENCY MEDICINE

## 2025-01-20 PROCEDURE — 99223 1ST HOSP IP/OBS HIGH 75: CPT | Performed by: HOSPITALIST

## 2025-01-20 PROCEDURE — 80053 COMPREHEN METABOLIC PANEL: CPT | Performed by: EMERGENCY MEDICINE

## 2025-01-20 PROCEDURE — 84484 ASSAY OF TROPONIN QUANT: CPT | Performed by: EMERGENCY MEDICINE

## 2025-01-20 RX ORDER — BUDESONIDE AND FORMOTEROL FUMARATE DIHYDRATE 160; 4.5 UG/1; UG/1
2 AEROSOL RESPIRATORY (INHALATION) 2 TIMES DAILY
Status: DISCONTINUED | OUTPATIENT
Start: 2025-01-21 | End: 2025-01-22 | Stop reason: HOSPADM

## 2025-01-20 RX ORDER — BISACODYL 10 MG
10 SUPPOSITORY, RECTAL RECTAL DAILY PRN
Status: DISCONTINUED | OUTPATIENT
Start: 2025-01-20 | End: 2025-01-22 | Stop reason: HOSPADM

## 2025-01-20 RX ORDER — CLOPIDOGREL BISULFATE 75 MG/1
75 TABLET ORAL DAILY
Status: DISCONTINUED | OUTPATIENT
Start: 2025-01-21 | End: 2025-01-22 | Stop reason: HOSPADM

## 2025-01-20 RX ORDER — MIRTAZAPINE 7.5 MG/1
7.5 TABLET, FILM COATED ORAL
Status: DISCONTINUED | OUTPATIENT
Start: 2025-01-20 | End: 2025-01-22 | Stop reason: HOSPADM

## 2025-01-20 RX ORDER — SODIUM CHLORIDE 9 MG/ML
75 INJECTION, SOLUTION INTRAVENOUS CONTINUOUS
Status: DISCONTINUED | OUTPATIENT
Start: 2025-01-20 | End: 2025-01-22 | Stop reason: HOSPADM

## 2025-01-20 RX ORDER — ALBUTEROL SULFATE 0.83 MG/ML
2.5 SOLUTION RESPIRATORY (INHALATION) EVERY 6 HOURS PRN
Status: DISCONTINUED | OUTPATIENT
Start: 2025-01-20 | End: 2025-01-22 | Stop reason: HOSPADM

## 2025-01-20 RX ORDER — BACLOFEN 10 MG/1
5 TABLET ORAL 3 TIMES DAILY
Status: DISCONTINUED | OUTPATIENT
Start: 2025-01-20 | End: 2025-01-22 | Stop reason: HOSPADM

## 2025-01-20 RX ORDER — METHENAMINE HIPPURATE 1000 MG/1
1 TABLET ORAL 2 TIMES DAILY WITH MEALS
Status: DISCONTINUED | OUTPATIENT
Start: 2025-01-21 | End: 2025-01-22 | Stop reason: HOSPADM

## 2025-01-20 RX ORDER — CIPROFLOXACIN 2 MG/ML
400 INJECTION, SOLUTION INTRAVENOUS ONCE
Status: COMPLETED | OUTPATIENT
Start: 2025-01-20 | End: 2025-01-20

## 2025-01-20 RX ORDER — GABAPENTIN 300 MG/1
300 CAPSULE ORAL 3 TIMES DAILY
Status: DISCONTINUED | OUTPATIENT
Start: 2025-01-20 | End: 2025-01-22 | Stop reason: HOSPADM

## 2025-01-20 RX ORDER — ACETAMINOPHEN 325 MG/1
650 TABLET ORAL EVERY 6 HOURS PRN
Status: DISCONTINUED | OUTPATIENT
Start: 2025-01-20 | End: 2025-01-22 | Stop reason: HOSPADM

## 2025-01-20 RX ORDER — LATANOPROST 50 UG/ML
1 SOLUTION/ DROPS OPHTHALMIC
Status: DISCONTINUED | OUTPATIENT
Start: 2025-01-20 | End: 2025-01-22 | Stop reason: HOSPADM

## 2025-01-20 RX ORDER — AMOXICILLIN 250 MG
2 CAPSULE ORAL
Status: DISCONTINUED | OUTPATIENT
Start: 2025-01-20 | End: 2025-01-22 | Stop reason: HOSPADM

## 2025-01-20 RX ORDER — CROMOLYN SODIUM 5.2 MG
1 AEROSOL, SPRAY WITH PUMP (ML) NASAL
Status: DISCONTINUED | OUTPATIENT
Start: 2025-01-20 | End: 2025-01-21 | Stop reason: RX

## 2025-01-20 RX ORDER — AMLODIPINE BESYLATE 10 MG/1
10 TABLET ORAL DAILY
Status: DISCONTINUED | OUTPATIENT
Start: 2025-01-21 | End: 2025-01-22 | Stop reason: HOSPADM

## 2025-01-20 RX ORDER — ENOXAPARIN SODIUM 100 MG/ML
40 INJECTION SUBCUTANEOUS DAILY
Status: DISCONTINUED | OUTPATIENT
Start: 2025-01-21 | End: 2025-01-22 | Stop reason: HOSPADM

## 2025-01-20 RX ORDER — POLYETHYLENE GLYCOL 3350 17 G/17G
17 POWDER, FOR SOLUTION ORAL 2 TIMES DAILY
Status: DISCONTINUED | OUTPATIENT
Start: 2025-01-21 | End: 2025-01-22 | Stop reason: HOSPADM

## 2025-01-20 RX ORDER — ATORVASTATIN CALCIUM 80 MG/1
80 TABLET, FILM COATED ORAL DAILY
Status: DISCONTINUED | OUTPATIENT
Start: 2025-01-21 | End: 2025-01-22 | Stop reason: HOSPADM

## 2025-01-20 RX ORDER — PANTOPRAZOLE SODIUM 40 MG/1
40 TABLET, DELAYED RELEASE ORAL
Status: DISCONTINUED | OUTPATIENT
Start: 2025-01-21 | End: 2025-01-22 | Stop reason: HOSPADM

## 2025-01-20 RX ORDER — PROPRANOLOL HYDROCHLORIDE 60 MG/1
60 CAPSULE, EXTENDED RELEASE ORAL DAILY
Status: DISCONTINUED | OUTPATIENT
Start: 2025-01-21 | End: 2025-01-22 | Stop reason: HOSPADM

## 2025-01-20 RX ORDER — GABAPENTIN 300 MG/1
600 CAPSULE ORAL
Status: DISCONTINUED | OUTPATIENT
Start: 2025-01-20 | End: 2025-01-22 | Stop reason: HOSPADM

## 2025-01-20 RX ADMIN — SODIUM CHLORIDE 75 ML/HR: 0.9 INJECTION, SOLUTION INTRAVENOUS at 23:35

## 2025-01-20 RX ADMIN — CEFTRIAXONE 1000 MG: 1 INJECTION, POWDER, FOR SOLUTION INTRAMUSCULAR; INTRAVENOUS at 19:27

## 2025-01-20 RX ADMIN — MELATONIN 3 MG: 3 TAB ORAL at 23:33

## 2025-01-20 RX ADMIN — LATANOPROST 1 DROP: 50 SOLUTION OPHTHALMIC at 23:37

## 2025-01-20 RX ADMIN — IOHEXOL 100 ML: 350 INJECTION, SOLUTION INTRAVENOUS at 14:48

## 2025-01-20 RX ADMIN — GABAPENTIN 600 MG: 300 CAPSULE ORAL at 23:33

## 2025-01-20 RX ADMIN — MIRTAZAPINE 7.5 MG: 7.5 TABLET, FILM COATED ORAL at 23:33

## 2025-01-20 RX ADMIN — BACLOFEN 5 MG: 10 TABLET ORAL at 23:33

## 2025-01-20 RX ADMIN — SODIUM CHLORIDE 1000 ML: 0.9 INJECTION, SOLUTION INTRAVENOUS at 13:48

## 2025-01-20 RX ADMIN — CIPROFLOXACIN 400 MG: 400 INJECTION, SOLUTION INTRAVENOUS at 17:35

## 2025-01-20 RX ADMIN — SENNOSIDES AND DOCUSATE SODIUM 2 TABLET: 8.6; 5 TABLET ORAL at 23:33

## 2025-01-20 NOTE — ED PROVIDER NOTES
Time reflects when diagnosis was documented in both MDM as applicable and the Disposition within this note       Time User Action Codes Description Comment    1/20/2025  5:26 PM Patrick Trejo Add [N17.9] ARMANDO (acute kidney injury) (HCC)     1/20/2025  5:27 PM Patrick Trejo Add [R10.9] Abdominal pain     1/20/2025  5:27 PM Patrick Trejo Add [T83.091A] Obstruction of Cage catheter, initial encounter (Formerly [facility])           ED Disposition       ED Disposition   Admit    Condition   Stable    Date/Time   Mon Jan 20, 2025  5:26 PM    Comment   Case was discussed with Dr. Solis and the patient's admission status was agreed to be Admission Status: inpatient status to the service of Dr. Solis .               Assessment & Plan       Medical Decision Making  74-year-old male presents with abdominal pain  Pain is located to the suprapubic region  No significant signs of infection surrounding the suprapubic site  However will obtain abdominal labs as well as CT abdomen pelvis  CT reveals significantly dilated bladder  Patient with concerns of obstruction of the suprapubic catheter  This was irrigated and became patent with relief of symptoms  Patient's blood work revealed ARMANDO and was admitted to medicine for further management and care  Urinalysis suspicious for UTI so antibiotics initiated    Problems Addressed:  Abdominal pain: acute illness or injury  ARMANDO (acute kidney injury) (HCC): acute illness or injury  Obstruction of Cage catheter, initial encounter (Formerly [facility]): acute illness or injury    Amount and/or Complexity of Data Reviewed  Labs: ordered.  Radiology: ordered.    Risk  Prescription drug management.  Decision regarding hospitalization.             Medications   ceftriaxone (ROCEPHIN) 1 g/50 mL in dextrose IVPB (0 mg Intravenous Stopped 1/20/25 2016)   sodium chloride 0.9 % bolus 1,000 mL (0 mL Intravenous Stopped 1/20/25 1448)   iohexol (OMNIPAQUE) 350 MG/ML injection (SINGLE-DOSE) 100 mL (100 mL Intravenous Given 1/20/25 1448)  "  ciprofloxacin (CIPRO) IVPB (premix in 5% dextrose) 400 mg 200 mL (0 mg Intravenous Stopped 1/20/25 6965)       ED Risk Strat Scores                                              History of Present Illness       Chief Complaint   Patient presents with    Abdominal Pain     Pt coming in from home via EMS, c/o intermittent abd pain for a \"few day\", denies N/V/D       Past Medical History:   Diagnosis Date    Acute laryngitis     Acute nonsuppurative otitis media, unspecified laterality     Arm weakness     Arthritis     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HCC)     Bladder infection     Bronchitis     Constipation     Cough     Diabetes (HCC)     Diabetes mellitus (HCC)     Dizziness     Dysfunction of eustachian tube     Erectile dysfunction of non-organic origin     Fatigue     Glaucoma     Hiatal hernia     Hypertension     Imbalance     Leg muscle spasm     Leukocytosis 04/01/2024    MS (multiple sclerosis) (HCC)     Multiple sclerosis (HCC)     Nephrolithiasis     Neurogenic bladder     No natural teeth     Sinus pain     Spinal stenosis     Strain of thoracic region     Stroke (HCC)     Suprapubic catheter (HCC)       Past Surgical History:   Procedure Laterality Date    APPENDECTOMY      BRAIN SURGERY      Coil placed in aneurysm    CEREBRAL ANEURYSM REPAIR      CYSTOSCOPY      CYSTOSCOPY      CYSTOSCOPY  06/11/2018    CYSTOSCOPY  01/15/2021    EYE SURGERY      transscleral cyclophotocoagulation noncontact YAG laser    IR SUPRAPUBIC CATHETER CHECK/CHANGE/REINSERTION/UPSIZE  3/28/2024    MYRINGOTOMY      with ventilation tube insertion    WV LITHOLAPAXY SMPL/SM <2.5 CM N/A 5/7/2019    Procedure: CYSTOSCOPY, holmium laser litholapaxy of bladder stones, EXCHANGE OF SP TUBE;  Surgeon: Javy Jeffries MD;  Location: [facility];  Service: Urology    SUPRAPUBIC CATHETER INSERTION        Family History   Problem Relation Age of Onset    Heart attack Mother     Stroke Mother     Heart attack Father     Anuerysm Father         In " Stomach     Aneurysm Father       Social History     Tobacco Use    Smoking status: Former     Current packs/day: 0.00     Average packs/day: 0.5 packs/day for 31.0 years (15.5 ttl pk-yrs)     Types: Cigarettes     Start date:      Quit date:      Years since quittin.0    Smokeless tobacco: Never   Vaping Use    Vaping status: Never Used   Substance Use Topics    Alcohol use: Not Currently    Drug use: No      E-Cigarette/Vaping    E-Cigarette Use Never User       E-Cigarette/Vaping Substances    Nicotine No     THC No     CBD No     Flavoring No     Other No     Unknown No       I have reviewed and agree with the history as documented.     HPI  74-year-old male with history of MS, type 2 diabetes, neurogenic bladder with chronic suprapubic catheter presenting with abdominal pain.  States that the abdominal pain started since this morning.  Patient reports no fever, chills, nausea, vomiting, diarrhea.  Has had a normal bowel movement yesterday.  Was admitted about a month ago for sepsis in the setting of influenza as well as possible UTI.    Review of Systems   Constitutional:  Negative for chills, diaphoresis, fever and unexpected weight change.   HENT:  Negative for ear pain and sore throat.    Eyes:  Negative for visual disturbance.   Respiratory:  Negative for cough, chest tightness and shortness of breath.    Cardiovascular:  Negative for chest pain and leg swelling.   Gastrointestinal:  Positive for abdominal pain. Negative for abdominal distention, constipation, diarrhea, nausea and vomiting.   Endocrine: Negative.    Genitourinary:  Negative for difficulty urinating and dysuria.   Musculoskeletal: Negative.    Skin: Negative.    Allergic/Immunologic: Negative.    Neurological: Negative.    Hematological: Negative.    Psychiatric/Behavioral: Negative.     All other systems reviewed and are negative.          Objective       ED Triage Vitals [25 1319]   Temperature Pulse Blood Pressure  Respirations SpO2 Patient Position - Orthostatic VS   97.8 °F (36.6 °C) 97 (!) 190/93 20 95 % Sitting      Temp Source Heart Rate Source BP Location FiO2 (%) Pain Score    Oral Monitor Right arm -- 9      Vitals      Date and Time Temp Pulse SpO2 Resp BP Pain Score FACES Pain Rating User   01/20/25 2015 -- 74 96 % 19 117/59 -- -- EC   01/20/25 2000 -- 74 97 % 22 -- No Pain -- EC   01/20/25 2000 -- -- -- -- 117/59 -- -- ES   01/20/25 1600 -- 86 95 % 20 156/72 -- -- EC   01/20/25 1530 -- 86 97 % 16 138/65 -- -- EC   01/20/25 1319 97.8 °F (36.6 °C) 97 95 % 20 190/93 9 -- EC            Physical Exam  Vitals and nursing note reviewed.   Constitutional:       General: He is not in acute distress.     Appearance: Normal appearance. He is not ill-appearing.   HENT:      Head: Normocephalic and atraumatic.      Right Ear: External ear normal.      Left Ear: External ear normal.      Nose: Nose normal.      Mouth/Throat:      Mouth: Mucous membranes are moist.      Pharynx: Oropharynx is clear.   Eyes:      General: No scleral icterus.        Right eye: No discharge.         Left eye: No discharge.      Extraocular Movements: Extraocular movements intact.      Conjunctiva/sclera: Conjunctivae normal.      Pupils: Pupils are equal, round, and reactive to light.   Cardiovascular:      Rate and Rhythm: Normal rate and regular rhythm.      Pulses: Normal pulses.      Heart sounds: Normal heart sounds.   Pulmonary:      Effort: Pulmonary effort is normal.      Breath sounds: Normal breath sounds.   Abdominal:      General: Abdomen is flat. Bowel sounds are normal. There is no distension.      Palpations: Abdomen is soft.      Tenderness: There is abdominal tenderness in the suprapubic area. There is no guarding or rebound.   Musculoskeletal:         General: Normal range of motion.      Cervical back: Normal range of motion and neck supple.   Skin:     General: Skin is warm and dry.      Capillary Refill: Capillary refill takes  less than 2 seconds.   Neurological:      General: No focal deficit present.      Mental Status: He is alert and oriented to person, place, and time. Mental status is at baseline.   Psychiatric:         Mood and Affect: Mood normal.         Behavior: Behavior normal.         Thought Content: Thought content normal.         Judgment: Judgment normal.         Results Reviewed       Procedure Component Value Units Date/Time    Urine Microscopic [[id number]]  (Abnormal) Collected: 01/20/25 1610    Lab Status: Final result Specimen: Urine, Indwelling Cage Catheter Updated: 01/20/25 1705     RBC, UA 1-2 /hpf      WBC, UA 20-30 /hpf      Epithelial Cells None Seen /hpf      Bacteria, UA Moderate /hpf      Budding Yeast Present     Hyphae Yeast Present    Urine culture [[id number]] Collected: 01/20/25 1610    Lab Status: In process Specimen: Urine, Indwelling Cage Catheter Updated: 01/20/25 1705    UA w Reflex to Microscopic w Reflex to Culture [[id number]]  (Abnormal) Collected: 01/20/25 1610    Lab Status: Final result Specimen: Urine, Indwelling Cage Catheter Updated: 01/20/25 1659     Color, UA Yellow     Clarity, UA Cloudy     Specific Gravity, UA 1.020     pH, UA 6.5     Leukocytes, UA Large     Nitrite, UA Negative     Protein,  (3+) mg/dl      Glucose, UA 70 (7/100%) mg/dl      Ketones, UA Trace mg/dl      Urobilinogen, UA <2.0 mg/dl      Bilirubin, UA Negative     Occult Blood, UA Small    HS Troponin I 2hr [[id number]]  (Normal) Collected: 01/20/25 1606    Lab Status: Final result Specimen: Blood from Arm, Left Updated: 01/20/25 1647     hs TnI 2hr 4 ng/L      Delta 2hr hsTnI -1 ng/L     Lactic acid, plasma (w/reflex if result > 2.0) [[id number]]  (Normal) Collected: 01/20/25 1350    Lab Status: Final result Specimen: Blood from Arm, Left Updated: 01/20/25 1422     LACTIC ACID 1.6 mmol/L     Narrative:      Result may be elevated if tourniquet was used during collection.    HS Troponin 0hr (reflex protocol)  [[id number]]  (Normal) Collected: 01/20/25 1350    Lab Status: Final result Specimen: Blood from Arm, Left Updated: 01/20/25 1420     hs TnI 0hr 5 ng/L     Comprehensive metabolic panel [[id number]]  (Abnormal) Collected: 01/20/25 1350    Lab Status: Final result Specimen: Blood from Arm, Left Updated: 01/20/25 1420     Sodium 134 mmol/L      Potassium 4.0 mmol/L      Chloride 102 mmol/L      CO2 20 mmol/L      ANION GAP 12 mmol/L      BUN 23 mg/dL      Creatinine 1.33 mg/dL      Glucose 118 mg/dL      Calcium 9.9 mg/dL      AST 15 U/L      ALT 8 U/L      Alkaline Phosphatase 85 U/L      Total Protein 8.1 g/dL      Albumin 4.1 g/dL      Total Bilirubin 0.57 mg/dL      eGFR 51 ml/min/1.73sq m     Narrative:      National Kid[employer] Disease Foundation guidelines for Chronic Kidney Disease (CKD):     Stage 1 with normal or high GFR (GFR > 90 mL/min/1.73 square meters)    Stage 2 Mild CKD (GFR = 60-89 mL/min/1.73 square meters)    Stage 3A Moderate CKD (GFR = 45-59 mL/min/1.73 square meters)    Stage 3B Moderate CKD (GFR = 30-44 mL/min/1.73 square meters)    Stage 4 Severe CKD (GFR = 15-29 mL/min/1.73 square meters)    Stage 5 End Stage CKD (GFR <15 mL/min/1.73 square meters)  Note: GFR calculation is accurate only with a steady state creatinine    Lipase [[id number]]  (Normal) Collected: 01/20/25 1350    Lab Status: Final result Specimen: Blood from Arm, Left Updated: 01/20/25 1420     Lipase 19 u/L     CBC and differential [[id number]]  (Abnormal) Collected: 01/20/25 1350    Lab Status: Final result Specimen: Blood from Arm, Left Updated: 01/20/25 1400     WBC 10.79 Thousand/uL      RBC 5.85 Million/uL      Hemoglobin 15.4 g/dL      Hematocrit 48.8 %      MCV 83 fL      MCH 26.3 pg      MCHC 31.6 g/dL      RDW 15.7 %      MPV 8.8 fL      Platelets 289 Thousands/uL      nRBC 0 /100 WBCs      Segmented % 70 %      Immature Grans % 1 %      Lymphocytes % 19 %      Monocytes % 7 %      Eosinophils Relative 2 %      Basophils  Relative 1 %      Absolute Neutrophils 7.65 Thousands/µL      Absolute Immature Grans 0.05 Thousand/uL      Absolute Lymphocytes 2.05 Thousands/µL      Absolute Monocytes 0.73 Thousand/µL      Eosinophils Absolute 0.26 Thousand/µL      Basophils Absolute 0.05 Thousands/µL             CT abdomen pelvis with contrast   Final Interpretation by Darlyn Weaver MD (01/20 3066)      Marked bladder distention and findings highly suspicious for cystitis. Recommend correlation with urinalysis. Suprapubic catheter in place.      Fecal impaction and probable stercoral proctitis. No pneumatosis.      The study was marked in EPIC for immediate notification.         Workstation performed: [id number]             Procedures    ED Medication and Procedure Management   Prior to Admission Medications   Prescriptions Last Dose Informant Patient Reported? Taking?   Empagliflozin (JARDIANCE) 10 MG TABS tablet  Outside Facility (Specify) Yes No   Sig: Take 10 mg by mouth every morning   Ergocalciferol (VITAMIN D2 PO)  Outside Facility (Specify) Yes No   Sig: Take 50,000 Units by mouth once a week   acetaminophen (TYLENOL) 325 mg tablet  Outside Facility (Specify) No No   Sig: Take 2 tablets (650 mg total) by mouth every 6 (six) hours as needed for mild pain   albuterol (2.5 mg/3 mL) 0.083 % nebulizer solution  Outside Facility (Specify) No No   Sig: Take 3 mL (2.5 mg total) by nebulization every 6 (six) hours as needed for wheezing or shortness of breath   amLODIPine-atorvastatin (CADUET) 10-80 MG per tablet  Outside Facility (Specify) Yes No   Sig: Take 1 tablet by mouth daily   baclofen 10 mg tablet  Outside Facility (Specify) Yes No   Sig: Take 5 mg by mouth 3 (three) times a day   bisacodyl (DULCOLAX) 10 mg suppository  Outside Facility (Specify) No No   Sig: Insert 1 suppository (10 mg total) into the rectum daily as needed for constipation for up to 12 doses   budesonide-formoterol (SYMBICORT) 160-4.5 mcg/act inhaler  Outside  Facility (Specify) Yes No   Sig: Inhale 2 puffs 2 (two) times a day Rinse mouth after use.   clopidogrel (PLAVIX) 75 mg tablet  Outside Facility (Specify) No No   Sig: Take 1 tablet (75 mg total) by mouth daily   cromolyn (NASALCHROM) 5.2 MG/ACT nasal spray  Outside Facility (Specify) No No   Si spray into each nostril 3 (three) times a day   cyanocobalamin (VITAMIN B-12) 500 MCG tablet  Outside Facility (Specify) No No   Sig: Take 1 tablet (500 mcg total) by mouth daily   gabapentin (NEURONTIN) 300 mg capsule   No No   Sig: Take 1 capsule (300 mg total) by mouth 3 (three) times a day   gabapentin (NEURONTIN) 300 mg capsule   No No   Sig: Take 2 capsules (600 mg total) by mouth daily at bedtime   latanoprost (XALATAN) 0.005 % ophthalmic solution  Outside Facility (Specify) Yes No   Sig: Administer 1 drop to both eyes daily at bedtime   meclizine (ANTIVERT) 12.5 MG tablet  Outside Facility (Specify) No No   Sig: Take 1 tablet (12.5 mg total) by mouth every 8 (eight) hours as needed for dizziness   melatonin 3 mg  Outside Facility (Specify) Yes No   Sig: Take 3 mg by mouth daily at bedtime as needed   metFORMIN (GLUCOPHAGE) 1000 MG tablet  Outside Facility (Specify) Yes No   Sig: Take 1,000 mg by mouth 2 (two) times a day with meals   methenamine hippurate (HIPREX) 1 g tablet  Outside Facility (Specify) No No   Sig: Take 1 tablet (1 g total) by mouth 2 (two) times a day with meals   mirtazapine (REMERON) 7.5 MG tablet  Outside Facility (Specify) Yes No   Sig: Take 7.5 mg by mouth daily at bedtime   pantoprazole (PROTONIX) 40 mg tablet  Outside Facility (Specify) Yes No   Sig: Take 40 mg by mouth daily   polyethylene glycol (MIRALAX) 17 g packet  Outside Facility (Specify) No No   Sig: Take 17 g by mouth 2 (two) times a day   propranolol (INDERAL LA) 60 mg 24 hr capsule  Outside Facility (Specify) Yes No   Sig: Take 60 mg by mouth daily   senna-docusate sodium (SENOKOT S) 8.6-50 mg per tablet  Outside Facility  (Specify) No No   Sig: Take 2 tablets by mouth daily at bedtime      Facility-Administered Medications: None     Patient's Medications   Discharge Prescriptions    No medications on file     No discharge procedures on file.  ED SEPSIS DOCUMENTATION   Time reflects when diagnosis was documented in both MDM as applicable and the Disposition within this note       Time User Action Codes Description Comment    1/20/2025  5:26 PM Patrick Trejo [N17.9] ARMANDO (acute kidney injury) (HCC)     1/20/2025  5:27 PM Patrick Trejo [R10.9] Abdominal pain     1/20/2025  5:27 PM Patrick Trejo [T83.091A] Obstruction of Cage catheter, initial encounter ([facility])                  Patrick Trejo MD  01/20/25 2978

## 2025-01-21 LAB
ANION GAP SERPL CALCULATED.3IONS-SCNC: 12 MMOL/L (ref 4–13)
BACTERIA UR CULT: NORMAL
BASOPHILS # BLD AUTO: 0.04 THOUSANDS/ΜL (ref 0–0.1)
BASOPHILS NFR BLD AUTO: 0 % (ref 0–1)
BUN SERPL-MCNC: 17 MG/DL (ref 5–25)
CALCIUM SERPL-MCNC: 8.9 MG/DL (ref 8.4–10.2)
CHLORIDE SERPL-SCNC: 106 MMOL/L (ref 96–108)
CO2 SERPL-SCNC: 18 MMOL/L (ref 21–32)
CREAT SERPL-MCNC: 0.94 MG/DL (ref 0.6–1.3)
EOSINOPHIL # BLD AUTO: 0.31 THOUSAND/ΜL (ref 0–0.61)
EOSINOPHIL NFR BLD AUTO: 3 % (ref 0–6)
ERYTHROCYTE [DISTWIDTH] IN BLOOD BY AUTOMATED COUNT: 15.6 % (ref 11.6–15.1)
GFR SERPL CREATININE-BSD FRML MDRD: 78 ML/MIN/1.73SQ M
GLUCOSE SERPL-MCNC: 153 MG/DL (ref 65–140)
GLUCOSE SERPL-MCNC: 75 MG/DL (ref 65–140)
GLUCOSE SERPL-MCNC: 78 MG/DL (ref 65–140)
HCT VFR BLD AUTO: 42.6 % (ref 36.5–49.3)
HGB BLD-MCNC: 13.6 G/DL (ref 12–17)
IMM GRANULOCYTES # BLD AUTO: 0.02 THOUSAND/UL (ref 0–0.2)
IMM GRANULOCYTES NFR BLD AUTO: 0 % (ref 0–2)
LYMPHOCYTES # BLD AUTO: 3.13 THOUSANDS/ΜL (ref 0.6–4.47)
LYMPHOCYTES NFR BLD AUTO: 30 % (ref 14–44)
MAGNESIUM SERPL-MCNC: 1.4 MG/DL (ref 1.9–2.7)
MCH RBC QN AUTO: 27.2 PG (ref 26.8–34.3)
MCHC RBC AUTO-ENTMCNC: 31.9 G/DL (ref 31.4–37.4)
MCV RBC AUTO: 85 FL (ref 82–98)
MONOCYTES # BLD AUTO: 0.96 THOUSAND/ΜL (ref 0.17–1.22)
MONOCYTES NFR BLD AUTO: 9 % (ref 4–12)
NEUTROPHILS # BLD AUTO: 5.96 THOUSANDS/ΜL (ref 1.85–7.62)
NEUTS SEG NFR BLD AUTO: 58 % (ref 43–75)
NRBC BLD AUTO-RTO: 0 /100 WBCS
PLATELET # BLD AUTO: 271 THOUSANDS/UL (ref 149–390)
PMV BLD AUTO: 8.8 FL (ref 8.9–12.7)
POTASSIUM SERPL-SCNC: 3.7 MMOL/L (ref 3.5–5.3)
RBC # BLD AUTO: 5 MILLION/UL (ref 3.88–5.62)
SODIUM SERPL-SCNC: 136 MMOL/L (ref 135–147)
WBC # BLD AUTO: 10.42 THOUSAND/UL (ref 4.31–10.16)

## 2025-01-21 PROCEDURE — 83735 ASSAY OF MAGNESIUM: CPT | Performed by: HOSPITALIST

## 2025-01-21 PROCEDURE — 82948 REAGENT STRIP/BLOOD GLUCOSE: CPT

## 2025-01-21 PROCEDURE — 80048 BASIC METABOLIC PNL TOTAL CA: CPT | Performed by: HOSPITALIST

## 2025-01-21 PROCEDURE — 99232 SBSQ HOSP IP/OBS MODERATE 35: CPT | Performed by: INTERNAL MEDICINE

## 2025-01-21 PROCEDURE — 85025 COMPLETE CBC W/AUTO DIFF WBC: CPT | Performed by: HOSPITALIST

## 2025-01-21 RX ORDER — INSULIN LISPRO 100 [IU]/ML
1-6 INJECTION, SOLUTION INTRAVENOUS; SUBCUTANEOUS
Status: DISCONTINUED | OUTPATIENT
Start: 2025-01-21 | End: 2025-01-22 | Stop reason: HOSPADM

## 2025-01-21 RX ADMIN — PANTOPRAZOLE SODIUM 40 MG: 40 TABLET, DELAYED RELEASE ORAL at 05:40

## 2025-01-21 RX ADMIN — BUDESONIDE AND FORMOTEROL FUMARATE DIHYDRATE 2 PUFF: 160; 4.5 AEROSOL RESPIRATORY (INHALATION) at 17:59

## 2025-01-21 RX ADMIN — Medication 500 MCG: at 08:44

## 2025-01-21 RX ADMIN — MIRTAZAPINE 7.5 MG: 7.5 TABLET, FILM COATED ORAL at 21:33

## 2025-01-21 RX ADMIN — BUDESONIDE AND FORMOTEROL FUMARATE DIHYDRATE 2 PUFF: 160; 4.5 AEROSOL RESPIRATORY (INHALATION) at 08:48

## 2025-01-21 RX ADMIN — INSULIN LISPRO 1 UNITS: 100 INJECTION, SOLUTION INTRAVENOUS; SUBCUTANEOUS at 21:36

## 2025-01-21 RX ADMIN — BACLOFEN 5 MG: 10 TABLET ORAL at 17:59

## 2025-01-21 RX ADMIN — CLOPIDOGREL BISULFATE 75 MG: 75 TABLET ORAL at 08:44

## 2025-01-21 RX ADMIN — CEFTRIAXONE 1000 MG: 1 INJECTION, POWDER, FOR SOLUTION INTRAMUSCULAR; INTRAVENOUS at 18:25

## 2025-01-21 RX ADMIN — PROPRANOLOL HYDROCHLORIDE 60 MG: 60 CAPSULE, EXTENDED RELEASE ORAL at 08:45

## 2025-01-21 RX ADMIN — GABAPENTIN 600 MG: 300 CAPSULE ORAL at 21:33

## 2025-01-21 RX ADMIN — BACLOFEN 5 MG: 10 TABLET ORAL at 08:44

## 2025-01-21 RX ADMIN — SODIUM CHLORIDE 75 ML/HR: 0.9 INJECTION, SOLUTION INTRAVENOUS at 11:50

## 2025-01-21 RX ADMIN — LATANOPROST 1 DROP: 50 SOLUTION OPHTHALMIC at 21:37

## 2025-01-21 RX ADMIN — BACLOFEN 5 MG: 10 TABLET ORAL at 21:33

## 2025-01-21 RX ADMIN — METHENAMINE HIPPURATE 1 G: 1000 TABLET ORAL at 18:00

## 2025-01-21 RX ADMIN — POLYETHYLENE GLYCOL 3350 17 G: 17 POWDER, FOR SOLUTION ORAL at 18:01

## 2025-01-21 RX ADMIN — POLYETHYLENE GLYCOL 3350 17 G: 17 POWDER, FOR SOLUTION ORAL at 08:44

## 2025-01-21 RX ADMIN — SENNOSIDES AND DOCUSATE SODIUM 2 TABLET: 8.6; 5 TABLET ORAL at 21:33

## 2025-01-21 RX ADMIN — AMLODIPINE BESYLATE 10 MG: 10 TABLET ORAL at 08:44

## 2025-01-21 RX ADMIN — ENOXAPARIN SODIUM 40 MG: 40 INJECTION SUBCUTANEOUS at 08:48

## 2025-01-21 RX ADMIN — GABAPENTIN 300 MG: 300 CAPSULE ORAL at 08:43

## 2025-01-21 RX ADMIN — GABAPENTIN 300 MG: 300 CAPSULE ORAL at 17:59

## 2025-01-21 RX ADMIN — METHENAMINE HIPPURATE 1 G: 1000 TABLET ORAL at 08:44

## 2025-01-21 RX ADMIN — ATORVASTATIN CALCIUM 80 MG: 80 TABLET, FILM COATED ORAL at 08:44

## 2025-01-21 NOTE — ASSESSMENT & PLAN NOTE
Patient presented with acute onset abdominal pain with minimal drainage out of his suprapubic catheter.    This was unclogged in the ER and his abdominal pain has resolved    He may have a UTI so we will put him on empiric Rocephin until we get urine cultures back    I do not think we need urology to see him right now as the clogging spend relieved

## 2025-01-21 NOTE — H&P
"H&P - Hospitalist   Name: Mathew Rico 74 y.o. male I MRN: [id number]  Unit/Bed#: ED-24 I Date of Admission: 1/20/2025   Date of Service: 1/20/2025 I Hospital Day: 0     Assessment & Plan  Urinary obstruction  Patient presented with acute onset abdominal pain with minimal drainage out of his suprapubic catheter.    This was unclogged in the ER and his abdominal pain has resolved    He may have a UTI so we will put him on empiric Rocephin until we get urine cultures back    I do not think we need urology to see him right now as the clogging spend relieved  UTI (urinary tract infection)  Possible UTI.  Put empirically on Rocephin  Multiple sclerosis (HCC)  Continue home regimen  Type 2 diabetes mellitus without complication, without long-term current use of insulin ([facility])  Lab Results   Component Value Date    HGBA1C 9.0 (H) 10/06/2024       No results for input(s): \"POCGLU\" in the last 72 hours.    Blood Sugar Average: Last 72 hrs:    Hold metformin.  Hold Jardiance.  Please let Humalog insulin sliding scale            Chief Complaint:     Abdominal Pain (Pt coming in from home via EMS, c/o intermittent abd pain for a \"few day\", denies N/V/D)    History of Present Illness  Mathew Rico is a 74 y.o. male with a PMH of Ultibro sclerosis, chronic suprapubic catheter who presents with acute onset abdominal pain over the bladder.  His acute catheter was clogged and was not draining the bladder.  Came to the emergency room.  They were able to vigorously flush this tube and then became unclogged.  All his symptoms have resolved.  No fever or chills.  No recent antibiotics.  Urinalysis does look possibly infected so we are asked to admit the patient.    Review of Systems   Constitutional:  Negative for chills and fever.   HENT:  Negative for ear pain and sore throat.    Eyes:  Negative for pain and visual disturbance.   Respiratory:  Negative for cough and shortness of breath.    Cardiovascular:  Negative for chest " pain and palpitations.   Gastrointestinal:  Positive for abdominal pain. Negative for vomiting.   Genitourinary:  Negative for dysuria and hematuria.   Musculoskeletal:  Negative for arthralgias and back pain.   Skin:  Negative for color change and rash.   Neurological:  Negative for seizures and syncope.   All other systems reviewed and are negative.      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[facility])     Bladder infection     Bronchitis     Constipation     Cough     Diabetes (HCC)     Diabetes mellitus (HCC)     Dizziness     Dysfunction of eustachian tube     Erectile dysfunction of non-organic origin     Fatigue     Glaucoma     Hiatal hernia     Hypertension     Imbalance     Leg muscle spasm     Leukocytosis 04/01/2024    MS (multiple sclerosis) (formerly [facility])     Multiple sclerosis (formerly [facility])     Nephrolithiasis     Neurogenic bladder     No natural teeth     Sinus pain     Spinal stenosis     Strain of thoracic region     Stroke (formerly [facility])     Suprapubic catheter (formerly [facility])      Past Surgical History:   Procedure Laterality Date    APPENDECTOMY      BRAIN SURGERY      Coil placed in aneurysm    CEREBRAL ANEURYSM REPAIR      CYSTOSCOPY      CYSTOSCOPY      CYSTOSCOPY  06/11/2018    CYSTOSCOPY  01/15/2021    EYE SURGERY      transscleral cyclophotocoagulation noncontact YAG laser    IR SUPRAPUBIC CATHETER CHECK/CHANGE/REINSERTION/UPSIZE  3/28/2024    MYRINGOTOMY      with ventilation tube insertion    MS LITHOLAPAXY SMPL/SM <2.5 CM N/A 5/7/2019    Procedure: CYSTOSCOPY, holmium laser litholapaxy of bladder stones, EXCHANGE OF SP TUBE;  Surgeon: Javy Jeffries MD;  Location: [facility];  Service: Urology    SUPRAPUBIC CATHETER INSERTION       Meds/Allergies:  Allergies:   Allergies   Allergen Reactions    Cephalexin Rash    Cephalexin Rash     Prior to  Admission Medications   Prescriptions Last Dose Informant Patient Reported? Taking?   Empagliflozin (JARDIANCE) 10 MG TABS tablet  Outside Facility (Specify) Yes No   Sig: Take 10 mg by mouth every morning   Ergocalciferol (VITAMIN D2 PO)  Outside Facility (Specify) Yes No   Sig: Take 50,000 Units by mouth once a week   acetaminophen (TYLENOL) 325 mg tablet  Outside Facility (Specify) No No   Sig: Take 2 tablets (650 mg total) by mouth every 6 (six) hours as needed for mild pain   albuterol (2.5 mg/3 mL) 0.083 % nebulizer solution  Outside Facility (Specify) No No   Sig: Take 3 mL (2.5 mg total) by nebulization every 6 (six) hours as needed for wheezing or shortness of breath   amLODIPine-atorvastatin (CADUET) 10-80 MG per tablet  Outside Facility (Specify) Yes No   Sig: Take 1 tablet by mouth daily   baclofen 10 mg tablet  Outside Facility (Specify) Yes No   Sig: Take 5 mg by mouth 3 (three) times a day   bisacodyl (DULCOLAX) 10 mg suppository  Outside Facility (Specify) No No   Sig: Insert 1 suppository (10 mg total) into the rectum daily as needed for constipation for up to 12 doses   budesonide-formoterol (SYMBICORT) 160-4.5 mcg/act inhaler  Outside Facility (Specify) Yes No   Sig: Inhale 2 puffs 2 (two) times a day Rinse mouth after use.   clopidogrel (PLAVIX) 75 mg tablet  Outside Facility (Specify) No No   Sig: Take 1 tablet (75 mg total) by mouth daily   cromolyn (NASALCHROM) 5.2 MG/ACT nasal spray  Outside Facility (Specify) No No   Si spray into each nostril 3 (three) times a day   cyanocobalamin (VITAMIN B-12) 500 MCG tablet  Outside Facility (Specify) No No   Sig: Take 1 tablet (500 mcg total) by mouth daily   gabapentin (NEURONTIN) 300 mg capsule   No No   Sig: Take 1 capsule (300 mg total) by mouth 3 (three) times a day   gabapentin (NEURONTIN) 300 mg capsule   No No   Sig: Take 2 capsules (600 mg total) by mouth daily at bedtime   latanoprost (XALATAN) 0.005 % ophthalmic solution  Outside Facility  (Specify) Yes No   Sig: Administer 1 drop to both eyes daily at bedtime   meclizine (ANTIVERT) 12.5 MG tablet  Outside Facility (Specify) No No   Sig: Take 1 tablet (12.5 mg total) by mouth every 8 (eight) hours as needed for dizziness   melatonin 3 mg  Outside Facility (Specify) Yes No   Sig: Take 3 mg by mouth daily at bedtime as needed   metFORMIN (GLUCOPHAGE) 1000 MG tablet  Outside Facility (Specify) Yes No   Sig: Take 1,000 mg by mouth 2 (two) times a day with meals   methenamine hippurate (HIPREX) 1 g tablet  Outside Facility (Specify) No No   Sig: Take 1 tablet (1 g total) by mouth 2 (two) times a day with meals   mirtazapine (REMERON) 7.5 MG tablet  Outside Facility (Specify) Yes No   Sig: Take 7.5 mg by mouth daily at bedtime   pantoprazole (PROTONIX) 40 mg tablet  Outside Facility (Specify) Yes No   Sig: Take 40 mg by mouth daily   polyethylene glycol (MIRALAX) 17 g packet  Outside Facility (Specify) No No   Sig: Take 17 g by mouth 2 (two) times a day   propranolol (INDERAL LA) 60 mg 24 hr capsule  Outside Facility (Specify) Yes No   Sig: Take 60 mg by mouth daily   senna-docusate sodium (SENOKOT S) 8.6-50 mg per tablet  Outside Facility (Specify) No No   Sig: Take 2 tablets by mouth daily at bedtime      Facility-Administered Medications: None     Social History:     Social History     Socioeconomic History    Marital status: Single     Spouse name: Not on file    Number of children: Not on file    Years of education: Not on file    Highest education level: Not on file   Occupational History    Occupation:    retired   Tobacco Use    Smoking status: Former     Current packs/day: 0.00     Average packs/day: 0.5 packs/day for 31.0 years (15.5 ttl pk-yrs)     Types: Cigarettes     Start date:      Quit date:      Years since quittin.0    Smokeless tobacco: Never   Vaping Use    Vaping status: Never Used   Substance and Sexual Activity    Alcohol use: Not Currently    Drug use:  No    Sexual activity: Not Currently   Other Topics Concern    Not on file   Social History Narrative    ** Merged History Encounter **          Social Drivers of Health     Financial Resource Strain: Not on file   Food Insecurity: No Food Insecurity (2024)    Nursing - Inadequate Food Risk Classification     Worried About Running Out of Food in the Last Year: Never true     Ran Out of Food in the Last Year: Never true     Ran Out of Food in the Last Year: Never true   Transportation Needs: No Transportation Needs (2024)    Nursing - Transportation Risk Classification     Lack of Transportation: Not on file     Lack of Transportation: No   Physical Activity: Not on file   Stress: Not on file   Social Connections: Not on file   Intimate Partner Violence: Unknown (2024)    Nursing IPS     Feels Physically and Emotionally Safe: Not on file     Physically Hurt by Someone: Not on file     Humiliated or Emotionally Abused by Someone: Not on file     Physically Hurt by Someone: No     Hurt or Threatened by Someone: No   Housing Stability: Unknown (2024)    Nursing: Inadequate Housing Risk Classification     Has Housing: Not on file     Worried About Losing Housing: Not on file     Unable to Get Utilities: Not on file     Unable to Pay for Housing in the Last Year: No     Has Housin         Objective   Vitals:   Blood Pressure: 156/72 (25 1600)  Pulse: 86 (25 1600)  Temperature: 97.8 °F (36.6 °C) (25 1319)  Temp Source: Oral (25 1319)  Respirations: 20 (25 1600)  Weight - Scale: 66.7 kg (147 lb 0.8 oz) (25 1324)  SpO2: 95 % (25 1600)    Physical Exam  Vitals and nursing note reviewed.   Constitutional:       General: He is not in acute distress.     Appearance: He is well-developed.   HENT:      Head: Normocephalic and atraumatic.   Eyes:      Conjunctiva/sclera: Conjunctivae normal.   Cardiovascular:      Rate and Rhythm: Normal rate and regular rhythm.       Heart sounds: No murmur heard.  Pulmonary:      Effort: Pulmonary effort is normal. No respiratory distress.      Breath sounds: Normal breath sounds.   Abdominal:      Palpations: Abdomen is soft.      Tenderness: There is no abdominal tenderness.   Musculoskeletal:         General: No swelling.      Cervical back: Neck supple.      Right lower leg: No edema.      Left lower leg: No edema.   Skin:     General: Skin is warm and dry.      Capillary Refill: Capillary refill takes less than 2 seconds.   Neurological:      Mental Status: He is alert.   Psychiatric:         Mood and Affect: Mood normal.         Additional Data:   Lab Results: I have reviewed the following results:  Results from last 7 days   Lab Units 01/20/25  1350   WBC Thousand/uL 10.79*   HEMOGLOBIN g/dL 15.4   HEMATOCRIT % 48.8   PLATELETS Thousands/uL 289   SEGS PCT % 70   LYMPHO PCT % 19   MONO PCT % 7   EOS PCT % 2     Results from last 7 days   Lab Units 01/20/25  1350   SODIUM mmol/L 134*   POTASSIUM mmol/L 4.0   CHLORIDE mmol/L 102   CO2 mmol/L 20*   ANION GAP mmol/L 12   BUN mg/dL 23   CREATININE mg/dL 1.33*   CALCIUM mg/dL 9.9   ALBUMIN g/dL 4.1   TOTAL BILIRUBIN mg/dL 0.57   ALK PHOS U/L 85   ALT U/L 8   AST U/L 15   EGFR ml/min/1.73sq m 51   GLUCOSE RANDOM mg/dL 118                   EKG, Pathology, and Other Studies Reviewed on Admission:   EKG        ** Please Note: This note has been constructed using a voice recognition system. **

## 2025-01-21 NOTE — PROGRESS NOTES
"Progress Note - Hospitalist   Name: Mathew Rico 74 y.o. male I MRN: [id number]  Unit/Bed#: E5 -01 I Date of Admission: 1/20/2025   Date of Service: 1/21/2025 I Hospital Day: 1    Assessment & Plan  Urinary obstruction  History of stroke, MS, hypertension, neurogenic bladder status post SPT who presented to the hospital with abdominal pain  In the ED found to have distended bladder from obstruction of urinary catheter  This was flushed in the ED and resolved  Patient is being treated for UTI.  Follow-up on urine culture.  Continue empiric ceftriaxone.  Multiple sclerosis (HCC)  MS with a history of neurogenic bladder status post suprapubic tube  Continue baclofen and gabapentin  Type 2 diabetes mellitus without complication, without long-term current use of insulin (HCC)  Lab Results   Component Value Date    HGBA1C 9.0 (H) 10/06/2024     No results for input(s): \"POCGLU\" in the last 72 hours.    Will place on sliding scale insulin during hospitalization  Chronic diastolic congestive heart failure (HCC)  Wt Readings from Last 3 Encounters:   01/20/25 64.1 kg (141 lb 5 oz)   12/31/24 71 kg (156 lb 8.4 oz)   12/18/24 66.7 kg (147 lb)     Not on maintenance diuretics prior to admission  Primary hypertension  Continue amlodipine  And also on propranolol for history of essential tremor  History of stroke  History of strokes noted continue clopidogrel    VTE Pharmacologic Prophylaxis: VTE Score: 4 Moderate Risk (Score 3-4) - Pharmacological DVT Prophylaxis Ordered: enoxaparin (Lovenox).    Mobility:   Basic Mobility Inpatient Raw Score: 8  -HLM Goal: 3: Sit at edge of bed  JH-HLM Achieved: 1: Laying in bed  JH-HLM Goal NOT achieved. Continue with multidisciplinary rounding and encourage appropriate mobility to improve upon -HLM goals.    Patient Centered Rounds: I have performed bedside rounds with nursing staff today.  Discussions with Specialists or Other Care Team Provider: Case management    Education " and Discussions with Family / Patient: Attempted to update  (brother) via phone. Unable to contact.    Current Length of Stay: 1 day(s)  Current Patient Status: Inpatient   Certification Statement: The patient will continue to require additional inpatient hospital stay due to urine culture data  Discharge Plan: Anticipate discharge in 24-48 hrs to home with home services.    Code Status: Level 1 - Full Code    Subjective   Patient seen and examined.  Feeling better today.  No abdominal pain.    Objective   Vitals:   Temp (24hrs), Av.8 °F (36.6 °C), Min:97.3 °F (36.3 °C), Max:98.2 °F (36.8 °C)    Temp:  [97.3 °F (36.3 °C)-98.2 °F (36.8 °C)] 98.2 °F (36.8 °C)  HR:  [74-86] 81  Resp:  [15-22] 16  BP: (110-156)/(44-72) 121/50  SpO2:  [93 %-97 %] 94 %  Body mass index is 24.26 kg/m².     Input and Output Summary (last 24 hours):     Intake/Output Summary (Last 24 hours) at 2025 1410  Last data filed at 2025 0851  Gross per 24 hour   Intake 1370 ml   Output 1900 ml   Net -530 ml       Physical Exam  Vitals reviewed.   Constitutional:       General: He is not in acute distress.  HENT:      Head: Atraumatic.   Cardiovascular:      Rate and Rhythm: Regular rhythm.      Heart sounds: Normal heart sounds.   Pulmonary:      Effort: Pulmonary effort is normal.      Breath sounds: Decreased breath sounds present. No wheezing.   Abdominal:      General: Bowel sounds are normal.      Palpations: Abdomen is soft.      Tenderness: There is no abdominal tenderness.   Musculoskeletal:         General: No swelling.   Skin:     General: Skin is warm and dry.   Neurological:      General: No focal deficit present.      Mental Status: He is alert.   Psychiatric:         Mood and Affect: Mood normal.       Lines/Drains:  Invasive Devices       Peripheral Intravenous Line  Duration             Peripheral IV 25 Distal;Dorsal (posterior);Left Forearm 1 day              Drain  Duration             Suprapubic  Catheter 20 Fr. 42 days                          Lab Results: I have reviewed the following results:   Results from last 7 days   Lab Units 01/21/25  0455 01/20/25  1350   WBC Thousand/uL 10.42* 10.79*   HEMOGLOBIN g/dL 13.6 15.4   PLATELETS Thousands/uL 271 289   MCV fL 85 83     Results from last 7 days   Lab Units 01/21/25  0455 01/20/25  1350   SODIUM mmol/L 136 134*   POTASSIUM mmol/L 3.7 4.0   CHLORIDE mmol/L 106 102   CO2 mmol/L 18* 20*   ANION GAP mmol/L 12 12   BUN mg/dL 17 23   CREATININE mg/dL 0.94 1.33*   CALCIUM mg/dL 8.9 9.9   ALBUMIN g/dL  --  4.1   TOTAL BILIRUBIN mg/dL  --  0.57   ALK PHOS U/L  --  85   ALT U/L  --  8   AST U/L  --  15   EGFR ml/min/1.73sq m 78 51   GLUCOSE RANDOM mg/dL 75 118     Results from last 7 days   Lab Units 01/21/25  0455   MAGNESIUM mg/dL 1.4*         Results from last 7 days   Lab Units 01/20/25  1606 01/20/25  1350   HS TNI 0HR ng/L  --  5   HS TNI 2HR ng/L 4  --           Results from last 7 days   Lab Units 01/20/25  1350   LACTIC ACID mmol/L 1.6                   Recent Cultures (last 7 days):         Imaging:  Reviewed radiology reports from this admission including:  CT abdomen pelvis with contrast  Result Date: 1/20/2025  Impression: Marked bladder distention and findings highly suspicious for cystitis. Recommend correlation with urinalysis. Suprapubic catheter in place. Fecal impaction and probable stercoral proctitis. No pneumatosis. The study was marked in EPIC for immediate notification. Workstation performed: [id number]       Last 24 Hours Medication List:     Current Facility-Administered Medications:     acetaminophen (TYLENOL) tablet 650 mg, Q6H PRN    albuterol inhalation solution 2.5 mg, Q6H PRN    amLODIPine (NORVASC) tablet 10 mg, Daily **AND** atorvastatin (LIPITOR) tablet 80 mg, Daily    baclofen tablet 5 mg, TID    bisacodyl (DULCOLAX) rectal suppository 10 mg, Daily PRN    budesonide-formoterol (SYMBICORT) 160-4.5 mcg/act inhaler 2 puff, BID     ceftriaxone (ROCEPHIN) 1 g/50 mL in dextrose IVPB, Q24H, Last Rate: Stopped (01/20/25 2016)    clopidogrel (PLAVIX) tablet 75 mg, Daily    cromolyn (NASALCHROM) nasal spray 1 spray, TID    cyanocobalamin (VITAMIN B-12) tablet 500 mcg, Daily    enoxaparin (LOVENOX) subcutaneous injection 40 mg, Daily    gabapentin (NEURONTIN) capsule 300 mg, TID    gabapentin (NEURONTIN) capsule 600 mg, HS    latanoprost (XALATAN) 0.005 % ophthalmic solution 1 drop, HS    melatonin tablet 3 mg, HS PRN    methenamine hippurate (HIPREX) tablet 1 g, BID With Meals    mirtazapine (REMERON) tablet 7.5 mg, HS    pantoprazole (PROTONIX) EC tablet 40 mg, Early Morning    polyethylene glycol (MIRALAX) packet 17 g, BID    propranolol (INDERAL LA) 24 hr capsule 60 mg, Daily    senna-docusate sodium (SENOKOT S) 8.6-50 mg per tablet 2 tablet, HS    sodium chloride 0.9 % infusion, Continuous, Last Rate: 75 mL/hr (01/21/25 1150)    Administrative Statements   Today, Patient Was Seen By: Shon Mendez, DO  I have spent a total time of 35 minutes in caring for this patient on the day of the visit/encounter including Patient and family education, Documenting in the medical record, Reviewing / ordering tests, medicine, procedures  , Obtaining or reviewing history  , and Communicating with other healthcare professionals .    **Please Note: This note may have been constructed using a voice recognition system.**

## 2025-01-21 NOTE — PLAN OF CARE
Problem: PAIN - ADULT  Goal: Verbalizes/displays adequate comfort level or baseline comfort level  Description: Interventions:  - Encourage patient to monitor pain and request assistance  - Assess pain using appropriate pain scale  - Administer analgesics based on type and severity of pain and evaluate response  - Implement non-pharmacological measures as appropriate and evaluate response  - Consider cultural and social influences on pain and pain management  - Notify physician/advanced practitioner if interventions unsuccessful or patient reports new pain  Outcome: Progressing     Problem: INFECTION - ADULT  Goal: Absence or prevention of progression during hospitalization  Description: INTERVENTIONS:  - Assess and monitor for signs and symptoms of infection  - Monitor lab/diagnostic results  - Monitor all insertion sites, i.e. indwelling lines, tubes, and drains  - Monitor endotracheal if appropriate and nasal secretions for changes in amount and color  - [place] appropriate cooling/warming therapies per order  - Administer medications as ordered  - Instruct and encourage patient and family to use good hand hygiene technique  - Identify and instruct in appropriate isolation precautions for identified infection/condition  Outcome: Progressing  Goal: Absence of fever/infection during neutropenic period  Description: INTERVENTIONS:  - Monitor WBC    Outcome: Progressing     Problem: SAFETY ADULT  Goal: Patient will remain free of falls  Description: INTERVENTIONS:  - Educate patient/family on patient safety including physical limitations  - Instruct patient to call for assistance with activity   - Consult OT/PT to assist with strengthening/mobility   - Keep Call bell within reach  - Keep bed low and locked with side rails adjusted as appropriate  - Keep care items and personal belongings within reach  - Initiate and maintain comfort rounds  - Make Fall Risk Sign visible to staff  - Offer Toileting every 2 Hours,  in advance of need  - Initiate/Maintain bedalarm  - Obtain necessary fall risk management equipment:  - Apply yellow socks and bracelet for high fall risk patients  - Consider moving patient to room near nurses station  Outcome: Progressing  Goal: Maintain or return to baseline ADL function  Description: INTERVENTIONS:  -  Assess patient's ability to carry out ADLs; assess patient's baseline for ADL function and identify physical deficits which impact ability to perform ADLs (bathing, care of mouth/teeth, toileting, grooming, dressing, etc.)  - Assess/evaluate cause of self-care deficits   - Assess range of motion  - Assess patient's mobility; develop plan if impaired  - Assess patient's need for assistive devices and provide as appropriate  - Encourage maximum independence but intervene and supervise when necessary  - Involve family in performance of ADLs  - Assess for home care needs following discharge   - Consider OT consult to assist with ADL evaluation and planning for discharge  - Provide patient education as appropriate  Outcome: Progressing  Goal: Maintains/Returns to pre admission functional level  Description: INTERVENTIONS:  - Perform AM-PAC 6 Click Basic Mobility/ Daily Activity assessment daily.  - Set and communicate daily mobility goal to care team and patient/family/caregiver.   - Collaborate with rehabilitation services on mobility goals if consulted    - Out of bed for toileting  - Record patient progress and toleration of activity level   Outcome: Progressing     Problem: DISCHARGE PLANNING  Goal: Discharge to home or other facility with appropriate resources  Description: INTERVENTIONS:  - Identify barriers to discharge w/patient and caregiver  - Arrange for needed discharge resources and transportation as appropriate  - Identify discharge learning needs (meds, wound care, etc.)  - Arrange for interpretive services to assist at discharge as needed  - Refer to Case Management Department for  coordinating discharge planning if the patient needs post-hospital services based on physician/advanced practitioner order or complex needs related to functional status, cognitive ability, or social support system  Outcome: Progressing     Problem: Knowledge Deficit  Goal: Patient/family/caregiver demonstrates understanding of disease process, treatment plan, medications, and discharge instructions  Description: Complete learning assessment and assess knowledge base.  Interventions:  - Provide teaching at level of understanding  - Provide teaching via preferred learning methods  Outcome: Progressing     Problem: Nutrition/Hydration-ADULT  Goal: Nutrient/Hydration intake appropriate for improving, restoring or maintaining nutritional needs  Description: Monitor and assess patient's nutrition/hydration status for malnutrition. Collaborate with interdisciplinary team and initiate plan and interventions as ordered.  Monitor patient's weight and dietary intake as ordered or per policy. Utilize nutrition screening tool and intervene as necessary. Determine patient's food preferences and provide high-protein, high-caloric foods as appropriate.     INTERVENTIONS:  - Monitor oral intake, urinary output, labs, and treatment plans  - Assess nutrition and hydration status and recommend course of action  - Evaluate amount of meals eaten  - Assist patient with eating if necessary   - Allow adequate time for meals  - Recommend/ encourage appropriate diets, oral nutritional supplements, and vitamin/mineral supplements  - Order, calculate, and assess calorie counts as needed  - Recommend, monitor, and adjust tube feedings and TPN/PPN based on assessed needs  - Assess need for intravenous fluids  - Provide specific nutrition/hydration education as appropriate  - Include patient/family/caregiver in decisions related to nutrition  Outcome: Progressing     Problem: Prexisting or High Potential for Compromised Skin Integrity  Goal: Skin  integrity is maintained or improved  Description: INTERVENTIONS:  - Identify patients at risk for skin breakdown  - Assess and monitor skin integrity  - Assess and monitor nutrition and hydration status  - Monitor labs   - Assess for incontinence   - Turn and reposition patient  - Assist with mobility/ambulation  - Relieve pressure over bony prominences  - Avoid friction and shearing  - Provide appropriate hygiene as needed including keeping skin clean and dry  - Evaluate need for skin moisturizer/barrier cream  - Collaborate with interdisciplinary team   - Patient/family teaching  - Consider wound care consult   Outcome: Progressing

## 2025-01-21 NOTE — PLAN OF CARE
Problem: PAIN - ADULT  Goal: Verbalizes/displays adequate comfort level or baseline comfort level  Description: Interventions:  - Encourage patient to monitor pain and request assistance  - Assess pain using appropriate pain scale  - Administer analgesics based on type and severity of pain and evaluate response  - Implement non-pharmacological measures as appropriate and evaluate response  - Consider cultural and social influences on pain and pain management  - Notify physician/advanced practitioner if interventions unsuccessful or patient reports new pain  Outcome: Progressing     Problem: INFECTION - ADULT  Goal: Absence or prevention of progression during hospitalization  Description: INTERVENTIONS:  - Assess and monitor for signs and symptoms of infection  - Monitor lab/diagnostic results  - Monitor all insertion sites, i.e. indwelling lines, tubes, and drains  - Monitor endotracheal if appropriate and nasal secretions for changes in amount and color  - [place] appropriate cooling/warming therapies per order  - Administer medications as ordered  - Instruct and encourage patient and family to use good hand hygiene technique  - Identify and instruct in appropriate isolation precautions for identified infection/condition  Outcome: Progressing  Goal: Absence of fever/infection during neutropenic period  Description: INTERVENTIONS:  - Monitor WBC    Outcome: Progressing     Problem: SAFETY ADULT  Goal: Patient will remain free of falls  Description: INTERVENTIONS:  - Educate patient/family on patient safety including physical limitations  - Instruct patient to call for assistance with activity   - Consult OT/PT to assist with strengthening/mobility   - Keep Call bell within reach  - Keep bed low and locked with side rails adjusted as appropriate  - Keep care items and personal belongings within reach  - Initiate and maintain comfort rounds  - Make Fall Risk Sign visible to staff  - Offer Toileting every 2 Hours,  in advance of need  - Initiate/Maintain bedalarm  - Obtain necessary fall risk management equipment:  - Apply yellow socks and bracelet for high fall risk patients  - Consider moving patient to room near nurses station  Outcome: Progressing  Goal: Maintain or return to baseline ADL function  Description: INTERVENTIONS:  -  Assess patient's ability to carry out ADLs; assess patient's baseline for ADL function and identify physical deficits which impact ability to perform ADLs (bathing, care of mouth/teeth, toileting, grooming, dressing, etc.)  - Assess/evaluate cause of self-care deficits   - Assess range of motion  - Assess patient's mobility; develop plan if impaired  - Assess patient's need for assistive devices and provide as appropriate  - Encourage maximum independence but intervene and supervise when necessary  - Involve family in performance of ADLs  - Assess for home care needs following discharge   - Consider OT consult to assist with ADL evaluation and planning for discharge  - Provide patient education as appropriate  Outcome: Progressing  Goal: Maintains/Returns to pre admission functional level  Description: INTERVENTIONS:  - Perform AM-PAC 6 Click Basic Mobility/ Daily Activity assessment daily.  - Set and communicate daily mobility goal to care team and patient/family/caregiver.   - Collaborate with rehabilitation services on mobility goals if consulted    - Out of bed for toileting  - Record patient progress and toleration of activity level   Outcome: Progressing     Problem: DISCHARGE PLANNING  Goal: Discharge to home or other facility with appropriate resources  Description: INTERVENTIONS:  - Identify barriers to discharge w/patient and caregiver  - Arrange for needed discharge resources and transportation as appropriate  - Identify discharge learning needs (meds, wound care, etc.)  - Arrange for interpretive services to assist at discharge as needed  - Refer to Case Management Department for  coordinating discharge planning if the patient needs post-hospital services based on physician/advanced practitioner order or complex needs related to functional status, cognitive ability, or social support system  Outcome: Progressing     Problem: Knowledge Deficit  Goal: Patient/family/caregiver demonstrates understanding of disease process, treatment plan, medications, and discharge instructions  Description: Complete learning assessment and assess knowledge base.  Interventions:  - Provide teaching at level of understanding  - Provide teaching via preferred learning methods  Outcome: Progressing     Problem: Nutrition/Hydration-ADULT  Goal: Nutrient/Hydration intake appropriate for improving, restoring or maintaining nutritional needs  Description: Monitor and assess patient's nutrition/hydration status for malnutrition. Collaborate with interdisciplinary team and initiate plan and interventions as ordered.  Monitor patient's weight and dietary intake as ordered or per policy. Utilize nutrition screening tool and intervene as necessary. Determine patient's food preferences and provide high-protein, high-caloric foods as appropriate.     INTERVENTIONS:  - Monitor oral intake, urinary output, labs, and treatment plans  - Assess nutrition and hydration status and recommend course of action  - Evaluate amount of meals eaten  - Assist patient with eating if necessary   - Allow adequate time for meals  - Recommend/ encourage appropriate diets, oral nutritional supplements, and vitamin/mineral supplements  - Order, calculate, and assess calorie counts as needed  - Recommend, monitor, and adjust tube feedings and TPN/PPN based on assessed needs  - Assess need for intravenous fluids  - Provide specific nutrition/hydration education as appropriate  - Include patient/family/caregiver in decisions related to nutrition  Outcome: Progressing

## 2025-01-21 NOTE — ASSESSMENT & PLAN NOTE
History of stroke, MS, hypertension, neurogenic bladder status post SPT who presented to the hospital with abdominal pain  In the ED found to have distended bladder from obstruction of urinary catheter  This was flushed in the ED and resolved  Patient is being treated for UTI.  Follow-up on urine culture.  Continue empiric ceftriaxone.

## 2025-01-21 NOTE — UTILIZATION REVIEW
"Initial Clinical Review    Admission: Date/Time/Statement:   Admission Orders (From admission, onward)       Ordered        01/20/25 1727  INPATIENT ADMISSION  Once                          Orders Placed This Encounter   Procedures    INPATIENT ADMISSION     Standing Status:   Standing     Number of Occurrences:   1     Level of Care:   Med Surg [16]     Estimated length of stay:   More than 2 Midnights     Certification:   I certify that inpatient services are medically necessary for this patient for a duration of greater than two midnights. See H&P and MD Progress Notes for additional information about the patient's course of treatment.     ED Arrival Information       Expected   -    Arrival   1/20/2025 13:14    Acuity   Urgent              Means of arrival   Ambulance    Escorted by   [facility] ([facility])    Service   Hospitalist    Admission type   Emergency              Arrival complaint   abd pain             Chief Complaint   Patient presents with    Abdominal Pain     Pt coming in from home via EMS, c/o intermittent abd pain for a \"few day\", denies N/V/D       Initial Presentation: 74 y.o. male presents to the ED via EMS from home with c/o acute intermittent abd pain over his bladder x several days.  SPC was clogged and not draining.  PMH: SPC in place, MS, NIDDM.  In the ED pt was HTN, pain rated 9*10, SPC was vigorously flushed until it started draining again.  Symptoms resolved.  Treated with IV fluids, IV antibiotics.  Labs - leukocytosis, elevated creat, abnormal UA.  ECG - NSR.  Imaging - Cystitis, pt has SPC, fecal impaction, probable stercoral proctitis.  On exam abd pain resolved.  Admitted to INPATIENT status with Urinary obstruction, UTI - PLAN: IV antibiotics, hold [employer], SSI cover.      Date: 1/21   Day 2:   Urinary obstruction - resolved, UTI -continue IV antibiotics, IV fluids.  WBC unchanged.  NA WNL. Low Mag. Pt reported feeling better. On exam has decreased breath sounds,  " no wheezing.  NA WNL.  On exam no abd pain but has decreased breath sounds.  Feels improved.      Date: 1/22  Day 3: Has surpassed a 2nd midnight with active treatments and services.  Urinary obstruction, UTI - no further leukocytosis, Hgb down to 11.4, K 3.4.  pt is being d/c home on oral antibiotics. Pt in stable condition today.        ED Treatment-Medication Administration from 01/20/2025 1314 to 01/20/2025 2250         Date/Time Order Dose Route Action     01/20/2025 1348 sodium chloride 0.9 % bolus 1,000 mL 1,000 mL Intravenous New Bag     01/20/2025 1448 iohexol (OMNIPAQUE) 350 MG/ML injection (SINGLE-DOSE) 100 mL 100 mL Intravenous Given     01/20/2025 1735 ciprofloxacin (CIPRO) IVPB (premix in 5% dextrose) 400 mg 200 mL 400 mg Intravenous New Bag     01/20/2025 1927 ceftriaxone (ROCEPHIN) 1 g/50 mL in dextrose IVPB 1,000 mg Intravenous New Bag            Scheduled Medications:  amLODIPine, 10 mg, Oral, Daily   And  atorvastatin, 80 mg, Oral, Daily  baclofen, 5 mg, Oral, TID  budesonide-formoterol, 2 puff, Inhalation, BID  cefTRIAXone, 1,000 mg, Intravenous, Q24H  clopidogrel, 75 mg, Oral, Daily  cyanocobalamin, 500 mcg, Oral, Daily  enoxaparin, 40 mg, Subcutaneous, Daily  gabapentin, 300 mg, Oral, TID  gabapentin, 600 mg, Oral, HS  insulin lispro, 1-6 Units, Subcutaneous, 4x Daily (AC & HS)  latanoprost, 1 drop, Both Eyes, HS  methenamine hippurate, 1 g, Oral, BID With Meals  mirtazapine, 7.5 mg, Oral, HS  pantoprazole, 40 mg, Oral, Early Morning  polyethylene glycol, 17 g, Oral, BID  propranolol, 60 mg, Oral, Daily  senna-docusate sodium, 2 tablet, Oral, HS      Continuous IV Infusions:  sodium chloride, 75 mL/hr, Intravenous, Continuous      PRN Meds:  acetaminophen, 650 mg, Oral, Q6H PRN  albuterol, 2.5 mg, Nebulization, Q6H PRN  bisacodyl, 10 mg, Rectal, Daily PRN  melatonin, 3 mg, Oral, HS PRN - x 1 1/20      ED Triage Vitals [01/20/25 1319]   Temperature Pulse Respirations Blood Pressure SpO2 Pain  Score   97.8 °F (36.6 °C) 97 20 (!) 190/93 95 % 9     Weight (last 2 days)       Date/Time Weight    01/20/25 2300 64.1 (141.32)    01/20/25 2253 65.1 (143.56)    01/20/25 1324 66.7 (147.05)    01/20/25 1319 69.5 (153.22)            Vital Signs (last 3 days)       Date/Time Temp Pulse Resp BP MAP (mmHg) SpO2 O2 Device Patient Position - Orthostatic VS Pain    01/22/25 1100 -- -- -- -- -- -- -- -- No Pain    01/22/25 0733 97.9 °F (36.6 °C) 68 -- 122/52 75 95 % None (Room air) Lying --    01/21/25 2321 98.1 °F (36.7 °C) 82 16 107/54 72 94 % None (Room air) Lying --    01/21/25 2010 -- -- -- -- -- -- -- -- No Pain    01/21/25 1522 97.4 °F (36.3 °C) 77 16 120/59 79 -- -- -- --    01/21/25 0948 -- -- -- -- -- -- -- -- No Pain    01/21/25 0844 -- -- -- 121/50 -- -- -- -- --    01/21/25 0842 -- 81 -- 121/50 -- 94 % -- -- --    01/21/25 0700 98.2 °F (36.8 °C) 76 16 110/44 77 94 % None (Room air) Lying --    01/20/25 2300 97.3 °F (36.3 °C) 81 15 119/64 82 93 % None (Room air) Lying --    01/20/25 2253 -- -- -- -- -- -- -- -- 5    01/20/25 2234 -- 77 20 115/57 78 96 % None (Room air) Lying No Pain    01/20/25 2015 -- 74 19 117/59 85 96 % None (Room air) Lying --    01/20/25 2000 -- 74 22 117/59 -- 97 % None (Room air) Lying No Pain    01/20/25 1600 -- 86 20 156/72 104 95 % None (Room air) Sitting --    01/20/25 1530 -- 86 16 138/65 94 97 % None (Room air) Sitting --    01/20/25 1350 -- -- -- -- -- -- None (Room air) -- --    01/20/25 1319 97.8 °F (36.6 °C) 97 20 190/93 134 95 % None (Room air) Sitting 9              Pertinent Labs/Diagnostic Test Results:   Radiology:  CT abdomen pelvis with contrast   Final Interpretation by Darlyn Weaver MD (01/20 1606)      Marked bladder distention and findings highly suspicious for cystitis. Recommend correlation with urinalysis. Suprapubic catheter in place.      Fecal impaction and probable stercoral proctitis. No pneumatosis.      The study was marked in EPIC for immediate  notification.         Workstation performed: [id number]           Cardiology:  ECG 12 lead   Final Result by Aditya Coyle MD (01/20 3029)   Age and gender specific ECG analysis    Normal sinus rhythm   Normal ECG   When compared with ECG of 20-Jan-2025 13:52,   No significant change was found   Confirmed by Aditya Coyle ([id number]) on 1/20/2025 5:58:59 PM      ECG 12 lead   Final Result by Aditya Coyle MD (01/20 5705)   Age and gender specific ECG analysis    Normal sinus rhythm   Normal ECG   When compared with ECG of 25-Dec-2024 11:18,   Sinus rhythm has replaced Junctional rhythm   Confirmed by Aditya Coyle ([id number]) on 1/20/2025 3:04:32 PM        GI:  No orders to display           Results from last 7 days   Lab Units 01/22/25  0451 01/21/25  0455 01/20/25  1350   WBC Thousand/uL 8.48 10.42* 10.79*   HEMOGLOBIN g/dL 11.4* 13.6 15.4   HEMATOCRIT % 35.8* 42.6 48.8   PLATELETS Thousands/uL 255 271 289   TOTAL NEUT ABS Thousands/µL  --  5.96 7.65*         Results from last 7 days   Lab Units 01/22/25  0451 01/21/25  0455 01/20/25  1350   SODIUM mmol/L 137 136 134*   POTASSIUM mmol/L 3.4* 3.7 4.0   CHLORIDE mmol/L 107 106 102   CO2 mmol/L 23 18* 20*   ANION GAP mmol/L 7 12 12   BUN mg/dL 11 17 23   CREATININE mg/dL 0.82 0.94 1.33*   EGFR ml/min/1.73sq m 86 78 51   CALCIUM mg/dL 8.5 8.9 9.9   MAGNESIUM mg/dL  --  1.4*  --      Results from last 7 days   Lab Units 01/22/25  0451 01/20/25  1350   AST U/L 10* 15   ALT U/L 5* 8   ALK PHOS U/L 54 85   TOTAL PROTEIN g/dL 6.1* 8.1   ALBUMIN g/dL 3.2* 4.1   TOTAL BILIRUBIN mg/dL 0.32 0.57     Results from last 7 days   Lab Units 01/22/25  1143 01/22/25  0753 01/21/25  2133 01/21/25  1554   POC GLUCOSE mg/dl 151* 98 153* 78     Results from last 7 days   Lab Units 01/22/25  0451 01/21/25  0455 01/20/25  1350   GLUCOSE RANDOM mg/dL 100 75 118       Results from last 7 days   Lab Units 01/20/25  1606 01/20/25  1350   HS TNI 0HR ng/L  --  5   HS TNI 2HR ng/L 4  --    HSTNI D2  ng/L -1  --      Results from last 7 days   Lab Units 01/22/25  0451   PROCALCITONIN ng/ml 0.09     Results from last 7 days   Lab Units 01/20/25  1350   LACTIC ACID mmol/L 1.6           Results from last 7 days   Lab Units 01/20/25  1350   LIPASE u/L 19           Results from last 7 days   Lab Units 01/20/25  1610   CLARITY UA  Cloudy   COLOR UA  Yellow   SPEC GRAV UA  1.020   PH UA  6.5   GLUCOSE UA mg/dl 70 (7/100%)*   KETONES UA mg/dl Trace*   BLOOD UA  Small*   PROTEIN UA mg/dl 300 (3+)*   NITRITE UA  Negative   BILIRUBIN UA  Negative   UROBILINOGEN UA (BE) mg/dl <2.0   LEUKOCYTES UA  Large*   WBC UA /hpf 20-30*   RBC UA /hpf 1-2   BACTERIA UA /hpf Moderate*   EPITHELIAL CELLS WET PREP /hpf None Seen     Past Medical History:   Diagnosis Date    Acute laryngitis     Acute nonsuppurative otitis media, unspecified laterality     Arm weakness     Arthritis     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[facility])     Bladder infection     Bronchitis     Constipation     Cough     Diabetes (formerly [facility])     Diabetes mellitus (formerly [facility])     Dizziness     Dysfunction of eustachian tube     Erectile dysfunction of non-organic origin     Fatigue     Glaucoma     Hiatal hernia     Hypertension     Imbalance     Leg muscle spasm     Leukocytosis 04/01/2024    MS (multiple sclerosis) (formerly [facility])     Multiple sclerosis (formerly [facility])     Nephrolithiasis     Neurogenic bladder     No natural teeth     Sinus pain     Spinal stenosis     Strain of thoracic region     Stroke (formerly [facility])     Suprapubic catheter (formerly [facility])      Present on Admission:   UTI (urinary tract infection)   Type 2 diabetes mellitus without complication, without long-term current use of insulin (formerly [facility])   Multiple sclerosis (formerly [facility])   Primary hypertension   Chronic diastolic congestive heart failure (formerly [facility])      Admitting Diagnosis: Abdominal pain [R10.9]  ARMANDO (acute kidney injury) (formerly [facility]) [N17.9]  Obstruction of Cage catheter, initial encounter (formerly [facility]) [T83.411A]  Age/Sex: 74 y.o. male    Network Utilization Review  Department  ATTENTION: Please call with any questions or concerns to [phone] and carefully listen to the prompts so that you are directed to the right person. All voicemails are confidential.   For Discharge needs, contact Care Management DC Support Team at [phone] opt. [phone]  Send all requests for admission clinical reviews, approved or denied determinations and any other requests to dedicated fax number below belonging to the campus where the patient is receiving treatment. List of dedicated fax numbers for the Facilities:  FACILITY NAME UR FAX NUMBER   ADMISSION DENIALS (Administrative/Medical Necessity) [phone]   DISCHARGE SUPPORT TEAM (NETWORK) [phone]   PARENT CHILD HEALTH (Maternity/NICU/Pediatrics) [phone]   [facility] [phone]   [facility] [phone]   [facility] [phone]   [facility] [phone]   [facility] [phone]   [facility] [phone]   [facility] [phone]   [facility] [phone]   [facility] [phone]   [facility] [phone]   [facility] [phone]   [facility] [phone]

## 2025-01-21 NOTE — ASSESSMENT & PLAN NOTE
MS with a history of neurogenic bladder status post suprapubic tube  Continue baclofen and gabapentin

## 2025-01-21 NOTE — ASSESSMENT & PLAN NOTE
"Lab Results   Component Value Date    HGBA1C 9.0 (H) 10/06/2024     No results for input(s): \"POCGLU\" in the last 72 hours.    Will place on sliding scale insulin during hospitalization  "

## 2025-01-21 NOTE — ASSESSMENT & PLAN NOTE
"Lab Results   Component Value Date    HGBA1C 9.0 (H) 10/06/2024       No results for input(s): \"POCGLU\" in the last 72 hours.    Blood Sugar Average: Last 72 hrs:    Hold metformin.  Hold Jardiance.  Please let Humalog insulin sliding scale  "

## 2025-01-21 NOTE — ASSESSMENT & PLAN NOTE
Wt Readings from Last 3 Encounters:   01/20/25 64.1 kg (141 lb 5 oz)   12/31/24 71 kg (156 lb 8.4 oz)   12/18/24 66.7 kg (147 lb)     Not on maintenance diuretics prior to admission

## 2025-01-21 NOTE — ASSESSMENT & PLAN NOTE
LATA Bartlett   [facility] [address] [place] [address]  [phone]      PATIENT NAME: Clair Murrieta  : 1961  DATE: 23  MRN: [id number]      Billing Provider: LATA Bartlett  Level of Service:   Patient PCP Information       Provider PCP Type    LATA Bartlett General            Reason for Visit / Chief Complaint: Hypertension, Hyperlipidemia, and Diabetes (Referral for knee replacement)       Update PCP  Update Chief Complaint         History of Present Illness / Problem Focused Workflow     Clair Murrieta presents to the clinic with Hypertension, Hyperlipidemia, and Diabetes (Referral for knee replacement)     Pt presents for routine follow up with lab and med refills. Overall doing well.      BP appears well controlled today. Home meds reviewed and will cont.  Advised to monitor BP at home. Advised on optimal BP readings - SBP < 130 & DBP < 80. Advised to call office for any persistent BP elevation and may have to prescribe or adjust BP med(s).  Recommended DASH diet, stay well hydrated with water daily, eliminate or decrease caffeinated and high calorie drinks, increase physical activity, and lose weight if BMI > 25.0.    Pt reports bg well controlled at home   Diabetes treatment goals: (unless otherwise indicated due to risk factor stratification)  To achieve normal or near normal glucose levels.  Fasting glucose:   Before meals: 100-140  2 hours after meal:less 150  Bedtime goal glucose > 100    She has complaints of bilateral knee pain and swelling. Decreased activity tolerance due to pain and discomfort. Pt reports she was told several years ago she was likely going to need knee replacement. She is requesting ortho evaluation, per her request will send referral to [facility]. We do not have xray available in clinic today for evaluation.     [facility]  Continue home regimen   Maintenance:  Colonoscopy:  recommend follow up 3-5 yrs for colonoscopy/3yrs for cologuard pt due 2024  MMG: will scheduled, recommend annual evaluation   Eye exam: will send referral,  recommend annual evaluation       Review of Systems     Review of Systems   Constitutional:  Negative for fatigue and fever.   HENT:  Negative for nasal congestion and sore throat.    Eyes:  Negative for visual disturbance.   Respiratory:  Negative for chest tightness and shortness of breath.    Cardiovascular:  Negative for chest pain and leg swelling.   Gastrointestinal:  Negative for abdominal pain, change in bowel habit and change in bowel habit.   Endocrine: Negative for polydipsia, polyphagia and polyuria.   Genitourinary:  Negative for dysuria and hematuria.   Musculoskeletal:  Positive for gait problem, joint swelling, leg pain and myalgias.   Integumentary:  Negative for rash.   Neurological:  Negative for dizziness, syncope, weakness and light-headedness.      Medical / Social / Family History     Past Medical History:   Diagnosis Date    Deviated nasal septum     Diabetes mellitus, type 2     Hyperlipidemia     Hypertension     Osteoarthritis     Personal history of colonic polyps 07/29/2019       Past Surgical History:   Procedure Laterality Date    COLONOSCOPY W/ POLYPECTOMY  07/29/2019       Social History  Ms. Murrieta  reports that she has never smoked. She has never used smokeless tobacco. She reports that she does not drink alcohol and does not use drugs.    Family History  Ms. Murrieta's family history is not on file.    Medications and Allergies     Medications  Outpatient Medications Marked as Taking for the 2/8/23 encounter (Office Visit) with LATA Bartlett   Medication Sig Dispense Refill    amLODIPine (NORVASC) 10 MG tablet TAKE 1 TABLET DAILY 90 tablet 1    aspirin (ECOTRIN LOW STRENGTH) 81 MG EC tablet TAKE 1 TABLET DAILY 90 tablet 1    atenoloL (TENORMIN) 100 MG tablet TAKE 1 TABLET DAILY 90 tablet 1     atorvastatin (LIPITOR) 40 MG tablet Take 1 tablet (40 mg total) by mouth once daily. 90 tablet 1    blood sugar diagnostic (ONETOUCH VERIO TEST STRIPS) Strp Test three times daily 360 strip 11    cetirizine (ZYRTEC) 10 MG tablet Take 1 tablet (10 mg total) by mouth once daily. 30 tablet 11    fluticasone propionate (FLONASE) 50 mcg/actuation nasal spray 1 spray (50 mcg total) by Each Nostril route once daily. 16 g 3    furosemide (LASIX) 20 MG tablet Take 1 tablet (20 mg total) by mouth once daily. 90 tablet 1    hydroCHLOROthiazide (HYDRODIURIL) 25 MG tablet Take 1 tablet (25 mg total) by mouth once daily. 90 tablet 1    lisinopriL (PRINIVIL,ZESTRIL) 40 MG tablet TAKE 1 TABLET DAILY 90 tablet 1    meloxicam (MOBIC) 15 MG tablet TAKE 1 TABLET DAILY 90 tablet 1    metFORMIN (GLUCOPHAGE-XR) 500 MG ER 24hr tablet TAKE 1 TABLET DAILY WITH BREAKFAST 90 tablet 1    ONETOUCH VERIO TEST STRIPS Strp USE 1 STRIP TO CHECK GLUCOSE TWICE DAILY 50 each 0    potassium chloride SA (KLOR-CON M20) 20 MEQ tablet TAKE 1 TABLET DAILY 90 tablet 1       Allergies  Review of patient's allergies indicates:  No Known Allergies    Physical Examination     Vitals:    02/08/23 1439   BP: 139/86   Pulse: 82   Resp: 18   Temp: 98.4 °F (36.9 °C)     Physical Exam  Eyes:      Pupils: Pupils are equal, round, and reactive to light.   Cardiovascular:      Rate and Rhythm: Normal rate and regular rhythm.      Heart sounds: Normal heart sounds. No murmur heard.  Pulmonary:      Breath sounds: Normal breath sounds. No wheezing, rhonchi or rales.   Abdominal:      General: Bowel sounds are normal.   Musculoskeletal:         General: No swelling or tenderness.      Cervical back: Normal range of motion and neck supple.      Comments: Bilateral knee swelling. No tenderness. No known injury. Difficulty going from sitting to standing. Difficulty with ambulation.    Skin:     General: Skin is warm and dry.   Neurological:      Mental Status: She is alert and  oriented to person, place, and time.        Assessment and Plan (including Health Maintenance)      Problem List  Smart Sets  Document Outside HM   :    Plan:   1. Hypertension, unspecified type  Comments:  well controlled today   cont home meds   Overview:  Cont home meds     Assessment & Plan:  The current medical regimen is effective;  continue present plan and medications.      Orders:  -     Comprehensive Metabolic Panel; Future; Expected date: 02/08/2023    2. Hyperlipidemia, unspecified hyperlipidemia type  Comments:  lab today   Assessment & Plan:  The current medical regimen is effective;  continue present plan and medications.      Orders:  -     Lipid Panel; Future; Expected date: 02/08/2023    3. Type 2 diabetes mellitus with hyperglycemia, without long-term current use of insulin  Comments:  reports well controlled  cont home meds and monitoring  Assessment & Plan:  The current medical regimen is effective;  continue present plan and medications.  Lab today     Orders:  -     Hemoglobin A1C; Future; Expected date: 02/08/2023    4. Arthritis  Comments:  refer to Ortho for evaluation for possible knee replacement   Orders:  -     Ambulatory referral/consult to Orthopedics; Future; Expected date: 02/15/2023    5. Chronic pain of both knees  -     Ambulatory referral/consult to Orthopedics; Future; Expected date: 02/15/2023    6. Encounter for vision screening  -     Ambulatory referral/consult to Optometry; Future; Expected date: 02/15/2023    7. Encounter for screening mammogram for malignant neoplasm of breast  -     Mammo Digital Screening Bilat; Future; Expected date: 02/08/2023    Other orders  -     Pneumococcal Conjugate Vaccine (20 Valent) (IM)  -     (In Office Administered) Tdap Vaccine           Health Maintenance Due   Topic Date Due    Cervical Cancer Screening  Never done    Shingles Vaccine (2 of 3) 02/04/2022    Mammogram  06/16/2022       Problem List Items Addressed This Visit           Cardiac/Vascular    Hyperlipidemia    Relevant Orders    Lipid Panel    Hypertension - Primary    Relevant Orders    Comprehensive Metabolic Panel       Endocrine    Type 2 diabetes mellitus with hyperglycemia, without long-term current use of insulin    Relevant Orders    Hemoglobin A1C     Other Visit Diagnoses       Arthritis        refer to Ortho for evaluation for possible knee replacement     Relevant Orders    Ambulatory referral/consult to Orthopedics    Chronic pain of both knees        Relevant Orders    Ambulatory referral/consult to Orthopedics    Encounter for vision screening        Relevant Orders    Ambulatory referral/consult to Optometry    Encounter for screening mammogram for malignant neoplasm of breast        Relevant Orders    Mammo Digital Screening Bilat            Health Maintenance Topics with due status: Not Due       Topic Last Completion Date    Colorectal Cancer Screening 07/29/2019    Diabetes Urine Screening 05/19/2022    Foot Exam 05/19/2022    Lipid Panel 11/02/2022    Hemoglobin A1c 11/02/2022    TETANUS VACCINE 02/08/2023    Low Dose Statin 02/08/2023       Future Appointments   Date Time Provider Department Center   5/18/2023  3:00 PM LATA Bartlett [facility] DANIE Plunkett            Signature:  LATA Bartlett [facility] [address] [place] [address]  [phone]    Date of encounter: 2/8/23

## 2025-01-21 NOTE — CASE MANAGEMENT
Case Management Assessment & Discharge Planning Note    Patient name Mathew Rico  Location [facility] 5 /E5 -* MRN [id number]  : 1950 Date 2025       Current Admission Date: 2025  Current Admission Diagnosis:Urinary obstruction   Patient Active Problem List    Diagnosis Date Noted Date Diagnosed    Urinary obstruction 2025     History of stroke 2024     Bladder wall thickening 2024     Pain of left hip 2024     Left leg pain 2024     Dizziness 2024     Pruritus 2024     Asymptomatic bacteriuria 2024     Blurred vision, bilateral 2024     Symptoms of upper respiratory infection (URI) 06/15/2024     Chest pain 2024     Acute encephalopathy 2024     GERSON on CPAP 2024     Fall 2024     Primary hypertension 2024     Type 2 diabetes mellitus without complication, without long-term current use of insulin ([facility]) 2024     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     Multiple sclerosis ([facility]) 2024     Urinary retention 2024     Neck pain 2024     Vitamin B deficiency 2024     Generalized weakness 2024     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     UTI (urinary tract infection) 02/15/2024     Fall 2023     Weakness 2023     Accidental fall from chair 2023     Constipation 2023     Chronic diastolic congestive heart failure (HCC) 2023     Hyponatremia 2023     Excessive daytime sleepiness 2022     Shortness of breath 2022     Sepsis ([facility]) 2021     Pancreatic mass 2020     Pancreatic lesion 2020     Bladder stones 2019     Bladder neck contracture 2019     History of CVA (cerebrovascular accident) 10/21/2018     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HCC) 2017     Diabetic neuropathy (HCC) 2016     Neurogenic bladder 2016     Thyroid nodule 2015     Cervical spinal stenosis 2014      Generalized anxiety disorder 07/13/2014     Obstructive sleep apnea 01/04/2013     Benign colon polyp 06/19/2012     Esophageal reflux 06/19/2012     Fatty liver 06/19/2012     Glaucoma 06/19/2012     Hyperlipidemia 06/19/2012     Multiple sclerosis (HCC) 06/19/2012     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) 06/19/2012     Primary hypertension 06/11/2012       LOS (days): 1  Geometric Mean LOS (GMLOS) (days):   Days to GMLOS:     OBJECTIVE:  PATIENT READMITTED TO HOSPITAL  Risk of Unplanned Readmission Score: 50.82         Current admission status: Inpatient       Preferred Pharmacy:   [id number] - [place] - [address]  [address] [facility] [address]  Phone: [phone] Fax: [phone]    [place], [place] - [address]  [address] [facility] [address]  Phone: [phone] Fax: [phone]    [facility] [place] - [address]  [facility] [address] [facility],  [address]  [facility] [address] [facility],  First Floor [facility] [address]  Phone: [phone] Fax: [phone]     PHARMACY [place], [place] - [address]  [address] [place] [id number]  Phone: [phone] Fax: [phone]    Primary Care Provider: Carolina Kumari MD    Primary Insurance: [facility]  Secondary Insurance:     ASSESSMENT:  Active Health Care Proxies       Guzman Rico Health Care Representative - [facility] Phone: [phone] (Home)                 Advance Directives  Primary Contact: Brother Guzman,   [phone]    Readmission Root Cause  30 Day Readmission: Yes  During your hospital stay, did someone (provider, nurse, ) explain your care to you in a way you could understand?: Unable to Assess (confused at this time)  Did you feel medically stable to leave the hospital?: Unable to Assess  Were you able to pay for your medication at the pharmacy?: Unable to Assess  Did you have reliable transportation to take you to your  appointments?: Unable to Assess  During previous admission, was a post-acute recommendation made?: Yes  What post-acute resources were offered?: HHC  Patient was readmitted due to: Urinary obstruction    Patient Information  Admitted from:: Home  Mental Status: Confused  During Assessment patient was accompanied by: Not accompanied during assessment  Assessment information provided by:: Other - please comment ([facility]  Merly, and chart review)  Primary Caregiver: Family  Caregiver's Name:: Guzman Rico, brother  Caregiver's Relationship to Patient:: Family Member  Caregiver's Telephone Number:: [phone]  Support Systems: Family members, Home care staff  County of Residence: [place]  What city do you live in?: [place]  Home entry access options. Select all that apply.: Stairs  Number of steps to enter home.: 3  Type of Current Residence: [facility]  Living Arrangements: Lives w/ Family members  Is patient a ?: No    Activities of Daily Living Prior to Admission  Functional Status: Total dependent  Completes ADLs independently?: No  Level of ADL dependence: Total Dependent  Ambulates independently?: No  Level of ambulatory dependence: Total Dependent  Does patient use assisted devices?: Yes  Assisted Devices (DME) used: Wheelchair, Bedside Commode, Hospital Bed, Shower Chair, Walker  Does patient currently own DME?: Yes  What DME does the patient currently own?: Bedside Commode, Wheelchair, Walker, Shower Chair, Hospital Bed  Does patient have a history of Outpatient Therapy (PT/OT)?: No  Does the patient have a history of Short-Term Rehab?: Yes  Does patient have a history of HHC?: Yes  Does patient currently have HHC?: Yes    Current Home Health Care  Type of Current Home Care Services: Home health aide, Nurse visit  Home Health Agency Name:: Other ([facility])  Current Home Health Follow-Up Provider:: PCP    Patient Information Continued  Income Source: SSI/SSD  Does patient have  prescription coverage?: Yes  Does patient receive dialysis treatments?: No  Does patient have a history of substance abuse?: No  Does patient have a history of Mental Health Diagnosis?: No    Means of Transportation  Means of Transport to Appts:: Other (Comment)          DISCHARGE DETAILS:    Discharge planning discussed with::  Merly at [facility]        CM contacted family/caregiver?: Yes     Contacts  Patient Contacts: Guzman Rico  Relationship to Patient:: Family  Contact Method: Phone  Phone Number: [phone]  Reason/Outcome: Discharge Planning    Requested Home Health Care         Home Health Agency Name:: Other ([facility])    Additional Comments: Patient with frequent ED visits and admissions due to suprapubic catheter. CM called [facility] to inquire about home health services patient is receiving. Merly  at [facility] advised she was not sure and that she would look into it and call this CM back. Merly acknowledges patient has frequent admissions and advised a nrse cannot be there everyday.

## 2025-01-22 VITALS
TEMPERATURE: 97.5 F | WEIGHT: 141.31 LBS | SYSTOLIC BLOOD PRESSURE: 114 MMHG | RESPIRATION RATE: 18 BRPM | DIASTOLIC BLOOD PRESSURE: 55 MMHG | HEIGHT: 64 IN | OXYGEN SATURATION: 95 % | HEART RATE: 76 BPM | BODY MASS INDEX: 24.13 KG/M2

## 2025-01-22 PROBLEM — E44.1 MILD PROTEIN-CALORIE MALNUTRITION (HCC): Status: ACTIVE | Noted: 2025-01-22

## 2025-01-22 LAB
ALBUMIN SERPL BCG-MCNC: 3.2 G/DL (ref 3.5–5)
ALP SERPL-CCNC: 54 U/L (ref 34–104)
ALT SERPL W P-5'-P-CCNC: 5 U/L (ref 7–52)
ANION GAP SERPL CALCULATED.3IONS-SCNC: 7 MMOL/L (ref 4–13)
AST SERPL W P-5'-P-CCNC: 10 U/L (ref 13–39)
BILIRUB SERPL-MCNC: 0.32 MG/DL (ref 0.2–1)
BUN SERPL-MCNC: 11 MG/DL (ref 5–25)
CALCIUM ALBUM COR SERPL-MCNC: 9.1 MG/DL (ref 8.3–10.1)
CALCIUM SERPL-MCNC: 8.5 MG/DL (ref 8.4–10.2)
CHLORIDE SERPL-SCNC: 107 MMOL/L (ref 96–108)
CO2 SERPL-SCNC: 23 MMOL/L (ref 21–32)
CREAT SERPL-MCNC: 0.82 MG/DL (ref 0.6–1.3)
ERYTHROCYTE [DISTWIDTH] IN BLOOD BY AUTOMATED COUNT: 15.6 % (ref 11.6–15.1)
GFR SERPL CREATININE-BSD FRML MDRD: 86 ML/MIN/1.73SQ M
GLUCOSE SERPL-MCNC: 100 MG/DL (ref 65–140)
GLUCOSE SERPL-MCNC: 151 MG/DL (ref 65–140)
GLUCOSE SERPL-MCNC: 98 MG/DL (ref 65–140)
HCT VFR BLD AUTO: 35.8 % (ref 36.5–49.3)
HGB BLD-MCNC: 11.4 G/DL (ref 12–17)
MCH RBC QN AUTO: 27.1 PG (ref 26.8–34.3)
MCHC RBC AUTO-ENTMCNC: 31.8 G/DL (ref 31.4–37.4)
MCV RBC AUTO: 85 FL (ref 82–98)
PLATELET # BLD AUTO: 255 THOUSANDS/UL (ref 149–390)
PMV BLD AUTO: 8.9 FL (ref 8.9–12.7)
POTASSIUM SERPL-SCNC: 3.4 MMOL/L (ref 3.5–5.3)
PROCALCITONIN SERPL-MCNC: 0.09 NG/ML
PROT SERPL-MCNC: 6.1 G/DL (ref 6.4–8.4)
RBC # BLD AUTO: 4.2 MILLION/UL (ref 3.88–5.62)
SODIUM SERPL-SCNC: 137 MMOL/L (ref 135–147)
WBC # BLD AUTO: 8.48 THOUSAND/UL (ref 4.31–10.16)

## 2025-01-22 PROCEDURE — 80053 COMPREHEN METABOLIC PANEL: CPT | Performed by: INTERNAL MEDICINE

## 2025-01-22 PROCEDURE — 99239 HOSP IP/OBS DSCHRG MGMT >30: CPT | Performed by: INTERNAL MEDICINE

## 2025-01-22 PROCEDURE — 85027 COMPLETE CBC AUTOMATED: CPT | Performed by: INTERNAL MEDICINE

## 2025-01-22 PROCEDURE — 82948 REAGENT STRIP/BLOOD GLUCOSE: CPT

## 2025-01-22 PROCEDURE — 84145 PROCALCITONIN (PCT): CPT | Performed by: INTERNAL MEDICINE

## 2025-01-22 RX ORDER — CEFPODOXIME PROXETIL 200 MG/1
200 TABLET, FILM COATED ORAL 2 TIMES DAILY
Qty: 10 TABLET | Refills: 0 | Status: SHIPPED | OUTPATIENT
Start: 2025-01-22 | End: 2025-01-22

## 2025-01-22 RX ORDER — CEFPODOXIME PROXETIL 200 MG/1
400 TABLET, FILM COATED ORAL ONCE
Status: DISCONTINUED | OUTPATIENT
Start: 2025-01-22 | End: 2025-01-22

## 2025-01-22 RX ORDER — CEFPODOXIME PROXETIL 200 MG/1
200 TABLET, FILM COATED ORAL 2 TIMES DAILY
Qty: 10 TABLET | Refills: 0 | Status: SHIPPED | OUTPATIENT
Start: 2025-01-22 | End: 2025-01-27

## 2025-01-22 RX ORDER — CEFPODOXIME PROXETIL 200 MG/1
200 TABLET, FILM COATED ORAL ONCE
Status: COMPLETED | OUTPATIENT
Start: 2025-01-22 | End: 2025-01-22

## 2025-01-22 RX ADMIN — CEFPODOXIME PROXETIL 200 MG: 200 TABLET, FILM COATED ORAL at 15:52

## 2025-01-22 RX ADMIN — ATORVASTATIN CALCIUM 80 MG: 80 TABLET, FILM COATED ORAL at 08:53

## 2025-01-22 RX ADMIN — INSULIN LISPRO 1 UNITS: 100 INJECTION, SOLUTION INTRAVENOUS; SUBCUTANEOUS at 11:56

## 2025-01-22 RX ADMIN — PROPRANOLOL HYDROCHLORIDE 60 MG: 60 CAPSULE, EXTENDED RELEASE ORAL at 08:52

## 2025-01-22 RX ADMIN — GABAPENTIN 300 MG: 300 CAPSULE ORAL at 08:53

## 2025-01-22 RX ADMIN — GABAPENTIN 300 MG: 300 CAPSULE ORAL at 15:47

## 2025-01-22 RX ADMIN — BACLOFEN 5 MG: 10 TABLET ORAL at 15:47

## 2025-01-22 RX ADMIN — Medication 500 MCG: at 08:53

## 2025-01-22 RX ADMIN — ENOXAPARIN SODIUM 40 MG: 40 INJECTION SUBCUTANEOUS at 09:01

## 2025-01-22 RX ADMIN — BACLOFEN 5 MG: 10 TABLET ORAL at 08:53

## 2025-01-22 RX ADMIN — BUDESONIDE AND FORMOTEROL FUMARATE DIHYDRATE 2 PUFF: 160; 4.5 AEROSOL RESPIRATORY (INHALATION) at 08:53

## 2025-01-22 RX ADMIN — METHENAMINE HIPPURATE 1 G: 1000 TABLET ORAL at 08:52

## 2025-01-22 RX ADMIN — PANTOPRAZOLE SODIUM 40 MG: 40 TABLET, DELAYED RELEASE ORAL at 05:24

## 2025-01-22 RX ADMIN — CLOPIDOGREL BISULFATE 75 MG: 75 TABLET ORAL at 08:53

## 2025-01-22 RX ADMIN — POLYETHYLENE GLYCOL 3350 17 G: 17 POWDER, FOR SOLUTION ORAL at 08:53

## 2025-01-22 RX ADMIN — AMLODIPINE BESYLATE 10 MG: 10 TABLET ORAL at 08:53

## 2025-01-22 NOTE — PLAN OF CARE
Problem: PAIN - ADULT  Goal: Verbalizes/displays adequate comfort level or baseline comfort level  Description: Interventions:  - Encourage patient to monitor pain and request assistance  - Assess pain using appropriate pain scale  - Administer analgesics based on type and severity of pain and evaluate response  - Implement non-pharmacological measures as appropriate and evaluate response  - Consider cultural and social influences on pain and pain management  - Notify physician/advanced practitioner if interventions unsuccessful or patient reports new pain  Outcome: Progressing     Problem: INFECTION - ADULT  Goal: Absence or prevention of progression during hospitalization  Description: INTERVENTIONS:  - Assess and monitor for signs and symptoms of infection  - Monitor lab/diagnostic results  - Monitor all insertion sites, i.e. indwelling lines, tubes, and drains  - Monitor endotracheal if appropriate and nasal secretions for changes in amount and color  - Gary appropriate cooling/warming therapies per order  - Administer medications as ordered  - Instruct and encourage patient and family to use good hand hygiene technique  - Identify and instruct in appropriate isolation precautions for identified infection/condition  Outcome: Progressing  Goal: Absence of fever/infection during neutropenic period  Description: INTERVENTIONS:  - Monitor WBC    Outcome: Progressing     Problem: SAFETY ADULT  Goal: Patient will remain free of falls  Description: INTERVENTIONS:  - Educate patient/family on patient safety including physical limitations  - Instruct patient to call for assistance with activity   - Consult OT/PT to assist with strengthening/mobility   - Keep Call bell within reach  - Keep bed low and locked with side rails adjusted as appropriate  - Keep care items and personal belongings within reach  - Initiate and maintain comfort rounds  - Make Fall Risk Sign visible to staff  - Offer Toileting every 2 Hours,  in advance of need  - Initiate/Maintain bedalarm  - Obtain necessary fall risk management equipment:  - Apply yellow socks and bracelet for high fall risk patients  - Consider moving patient to room near nurses station  Outcome: Progressing  Goal: Maintain or return to baseline ADL function  Description: INTERVENTIONS:  -  Assess patient's ability to carry out ADLs; assess patient's baseline for ADL function and identify physical deficits which impact ability to perform ADLs (bathing, care of mouth/teeth, toileting, grooming, dressing, etc.)  - Assess/evaluate cause of self-care deficits   - Assess range of motion  - Assess patient's mobility; develop plan if impaired  - Assess patient's need for assistive devices and provide as appropriate  - Encourage maximum independence but intervene and supervise when necessary  - Involve family in performance of ADLs  - Assess for home care needs following discharge   - Consider OT consult to assist with ADL evaluation and planning for discharge  - Provide patient education as appropriate  Outcome: Progressing  Goal: Maintains/Returns to pre admission functional level  Description: INTERVENTIONS:  - Perform AM-PAC 6 Click Basic Mobility/ Daily Activity assessment daily.  - Set and communicate daily mobility goal to care team and patient/family/caregiver.   - Collaborate with rehabilitation services on mobility goals if consulted    - Out of bed for toileting  - Record patient progress and toleration of activity level   Outcome: Progressing     Problem: DISCHARGE PLANNING  Goal: Discharge to home or other facility with appropriate resources  Description: INTERVENTIONS:  - Identify barriers to discharge w/patient and caregiver  - Arrange for needed discharge resources and transportation as appropriate  - Identify discharge learning needs (meds, wound care, etc.)  - Arrange for interpretive services to assist at discharge as needed  - Refer to Case Management Department for  coordinating discharge planning if the patient needs post-hospital services based on physician/advanced practitioner order or complex needs related to functional status, cognitive ability, or social support system  Outcome: Progressing     Problem: Knowledge Deficit  Goal: Patient/family/caregiver demonstrates understanding of disease process, treatment plan, medications, and discharge instructions  Description: Complete learning assessment and assess knowledge base.  Interventions:  - Provide teaching at level of understanding  - Provide teaching via preferred learning methods  Outcome: Progressing     Problem: Nutrition/Hydration-ADULT  Goal: Nutrient/Hydration intake appropriate for improving, restoring or maintaining nutritional needs  Description: Monitor and assess patient's nutrition/hydration status for malnutrition. Collaborate with interdisciplinary team and initiate plan and interventions as ordered.  Monitor patient's weight and dietary intake as ordered or per policy. Utilize nutrition screening tool and intervene as necessary. Determine patient's food preferences and provide high-protein, high-caloric foods as appropriate.     INTERVENTIONS:  - Monitor oral intake, urinary output, labs, and treatment plans  - Assess nutrition and hydration status and recommend course of action  - Evaluate amount of meals eaten  - Assist patient with eating if necessary   - Allow adequate time for meals  - Recommend/ encourage appropriate diets, oral nutritional supplements, and vitamin/mineral supplements  - Order, calculate, and assess calorie counts as needed  - Recommend, monitor, and adjust tube feedings and TPN/PPN based on assessed needs  - Assess need for intravenous fluids  - Provide specific nutrition/hydration education as appropriate  - Include patient/family/caregiver in decisions related to nutrition  Outcome: Progressing     Problem: Prexisting or High Potential for Compromised Skin Integrity  Goal: Skin  integrity is maintained or improved  Description: INTERVENTIONS:  - Identify patients at risk for skin breakdown  - Assess and monitor skin integrity  - Assess and monitor nutrition and hydration status  - Monitor labs   - Assess for incontinence   - Turn and reposition patient  - Assist with mobility/ambulation  - Relieve pressure over bony prominences  - Avoid friction and shearing  - Provide appropriate hygiene as needed including keeping skin clean and dry  - Evaluate need for skin moisturizer/barrier cream  - Collaborate with interdisciplinary team   - Patient/family teaching  - Consider wound care consult   Outcome: Progressing

## 2025-01-22 NOTE — PLAN OF CARE
Problem: PAIN - ADULT  Goal: Verbalizes/displays adequate comfort level or baseline comfort level  Description: Interventions:  - Encourage patient to monitor pain and request assistance  - Assess pain using appropriate pain scale  - Administer analgesics based on type and severity of pain and evaluate response  - Implement non-pharmacological measures as appropriate and evaluate response  - Consider cultural and social influences on pain and pain management  - Notify physician/advanced practitioner if interventions unsuccessful or patient reports new pain  Outcome: Progressing     Problem: INFECTION - ADULT  Goal: Absence or prevention of progression during hospitalization  Description: INTERVENTIONS:  - Assess and monitor for signs and symptoms of infection  - Monitor lab/diagnostic results  - Monitor all insertion sites, i.e. indwelling lines, tubes, and drains  - Monitor endotracheal if appropriate and nasal secretions for changes in amount and color  - [place] appropriate cooling/warming therapies per order  - Administer medications as ordered  - Instruct and encourage patient and family to use good hand hygiene technique  - Identify and instruct in appropriate isolation precautions for identified infection/condition  Outcome: Progressing  Goal: Absence of fever/infection during neutropenic period  Description: INTERVENTIONS:  - Monitor WBC    Outcome: Progressing     Problem: SAFETY ADULT  Goal: Patient will remain free of falls  Description: INTERVENTIONS:  - Educate patient/family on patient safety including physical limitations  - Instruct patient to call for assistance with activity   - Consult OT/PT to assist with strengthening/mobility   - Keep Call bell within reach  - Keep bed low and locked with side rails adjusted as appropriate  - Keep care items and personal belongings within reach  - Initiate and maintain comfort rounds  - Make Fall Risk Sign visible to staff  - Offer Toileting every 2 Hours,  in advance of need  - Initiate/Maintain bedalarm  - Obtain necessary fall risk management equipment:  - Apply yellow socks and bracelet for high fall risk patients  - Consider moving patient to room near nurses station  Outcome: Progressing  Goal: Maintain or return to baseline ADL function  Description: INTERVENTIONS:  -  Assess patient's ability to carry out ADLs; assess patient's baseline for ADL function and identify physical deficits which impact ability to perform ADLs (bathing, care of mouth/teeth, toileting, grooming, dressing, etc.)  - Assess/evaluate cause of self-care deficits   - Assess range of motion  - Assess patient's mobility; develop plan if impaired  - Assess patient's need for assistive devices and provide as appropriate  - Encourage maximum independence but intervene and supervise when necessary  - Involve family in performance of ADLs  - Assess for home care needs following discharge   - Consider OT consult to assist with ADL evaluation and planning for discharge  - Provide patient education as appropriate  Outcome: Progressing  Goal: Maintains/Returns to pre admission functional level  Description: INTERVENTIONS:  - Perform AM-PAC 6 Click Basic Mobility/ Daily Activity assessment daily.  - Set and communicate daily mobility goal to care team and patient/family/caregiver.   - Collaborate with rehabilitation services on mobility goals if consulted    - Out of bed for toileting  - Record patient progress and toleration of activity level   Outcome: Progressing     Problem: DISCHARGE PLANNING  Goal: Discharge to home or other facility with appropriate resources  Description: INTERVENTIONS:  - Identify barriers to discharge w/patient and caregiver  - Arrange for needed discharge resources and transportation as appropriate  - Identify discharge learning needs (meds, wound care, etc.)  - Arrange for interpretive services to assist at discharge as needed  - Refer to Case Management Department for  coordinating discharge planning if the patient needs post-hospital services based on physician/advanced practitioner order or complex needs related to functional status, cognitive ability, or social support system  Outcome: Progressing     Problem: Knowledge Deficit  Goal: Patient/family/caregiver demonstrates understanding of disease process, treatment plan, medications, and discharge instructions  Description: Complete learning assessment and assess knowledge base.  Interventions:  - Provide teaching at level of understanding  - Provide teaching via preferred learning methods  Outcome: Progressing     Problem: Nutrition/Hydration-ADULT  Goal: Nutrient/Hydration intake appropriate for improving, restoring or maintaining nutritional needs  Description: Monitor and assess patient's nutrition/hydration status for malnutrition. Collaborate with interdisciplinary team and initiate plan and interventions as ordered.  Monitor patient's weight and dietary intake as ordered or per policy. Utilize nutrition screening tool and intervene as necessary. Determine patient's food preferences and provide high-protein, high-caloric foods as appropriate.     INTERVENTIONS:  - Monitor oral intake, urinary output, labs, and treatment plans  - Assess nutrition and hydration status and recommend course of action  - Evaluate amount of meals eaten  - Assist patient with eating if necessary   - Allow adequate time for meals  - Recommend/ encourage appropriate diets, oral nutritional supplements, and vitamin/mineral supplements  - Order, calculate, and assess calorie counts as needed  - Recommend, monitor, and adjust tube feedings and TPN/PPN based on assessed needs  - Assess need for intravenous fluids  - Provide specific nutrition/hydration education as appropriate  - Include patient/family/caregiver in decisions related to nutrition  Outcome: Progressing     Problem: Prexisting or High Potential for Compromised Skin Integrity  Goal: Skin  integrity is maintained or improved  Description: INTERVENTIONS:  - Identify patients at risk for skin breakdown  - Assess and monitor skin integrity  - Assess and monitor nutrition and hydration status  - Monitor labs   - Assess for incontinence   - Turn and reposition patient  - Assist with mobility/ambulation  - Relieve pressure over bony prominences  - Avoid friction and shearing  - Provide appropriate hygiene as needed including keeping skin clean and dry  - Evaluate need for skin moisturizer/barrier cream  - Collaborate with interdisciplinary team   - Patient/family teaching  - Consider wound care consult   Outcome: Progressing

## 2025-01-22 NOTE — DISCHARGE SUMMARY
Discharge Summary - Hospitalist   Name: Mathew Rico 74 y.o. male I MRN: [id number]  Unit/Bed#: E5 -01 I Date of Admission: 1/20/2025   Date of Service: 1/22/2025 I Hospital Day: 2    Assessment & Plan  Urinary obstruction  History of stroke, MS, hypertension, neurogenic bladder status post SPT who presented to the hospital with abdominal pain  In the ED found to have distended bladder from obstruction of urinary catheter  This was flushed in the ED and resolved  Patient is being treated for UTI.  Urine culture with mixed contaminants  Has been on empiric ceftriaxone for 2 days.  Will discharge with 5 more days of cefpodoxime to complete a 7-day course  Patient reports he has staff from [facility] 5 days a week.  Advised to flush tube daily  Multiple sclerosis (HCC)  MS with a history of neurogenic bladder status post suprapubic tube  Continue baclofen and gabapentin  Type 2 diabetes mellitus without complication, without long-term current use of insulin (HCC)  Lab Results   Component Value Date    HGBA1C 9.0 (H) 10/06/2024     Recent Labs     01/21/25  1554 01/21/25  2133 01/22/25  0753 01/22/25  1143   POCGLU 78 153* 98 151*     Was on sliding scale insulin during hospitalization  Chronic diastolic congestive heart failure (HCC)  Wt Readings from Last 3 Encounters:   01/20/25 64.1 kg (141 lb 5 oz)   12/31/24 71 kg (156 lb 8.4 oz)   12/18/24 66.7 kg (147 lb)     Not on maintenance diuretics prior to admission  Primary hypertension  Continue amlodipine  And also on propranolol for history of essential tremor  History of stroke  History of strokes noted continue clopidogrel      Medical Problems       Resolved Problems  Date Reviewed: 1/21/2025   None        Discharging Physician / Practitioner: Shon Mendez DO  PCP: Carolina Kumari MD  Admission Date:   Admission Orders (From admission, onward)       Ordered        01/20/25 1727  INPATIENT ADMISSION  Once                        Discharge Date:  01/22/25    Consultations During Hospital Stay:  None     Procedures Performed:   * No surgery found *     Images:   CT abdomen pelvis with contrast  Result Date: 1/20/2025  Impression: Marked bladder distention and findings highly suspicious for cystitis. Recommend correlation with urinalysis. Suprapubic catheter in place. Fecal impaction and probable stercoral proctitis. No pneumatosis. The study was marked in EPIC for immediate notification. Workstation performed: [id number]       Lab Results: I have reviewed the following results:  Results from last 7 days   Lab Units 01/22/25  0451 01/21/25  0455 01/20/25  1350   WBC Thousand/uL 8.48 10.42* 10.79*   HEMOGLOBIN g/dL 11.4* 13.6 15.4   HEMATOCRIT % 35.8* 42.6 48.8   MCV fL 85 85 83   PLATELETS Thousands/uL 255 271 289     Results from last 7 days   Lab Units 01/22/25  0451 01/21/25  0455 01/20/25  1350   SODIUM mmol/L 137 136 134*   POTASSIUM mmol/L 3.4* 3.7 4.0   CHLORIDE mmol/L 107 106 102   CO2 mmol/L 23 18* 20*   BUN mg/dL 11 17 23   CREATININE mg/dL 0.82 0.94 1.33*   CALCIUM mg/dL 8.5 8.9 9.9   ALBUMIN g/dL 3.2*  --  4.1   TOTAL BILIRUBIN mg/dL 0.32  --  0.57   ALK PHOS U/L 54  --  85   ALT U/L 5*  --  8   AST U/L 10*  --  15   EGFR ml/min/1.73sq m 86 78 51   GLUCOSE RANDOM mg/dL 100 75 118         Results from last 7 days   Lab Units 01/20/25  1606 01/20/25  1350   HS TNI 0HR ng/L  --  5   HS TNI 2HR ng/L 4  --               Results from last 7 days   Lab Units 01/22/25  1143 01/22/25  0753 01/21/25  2133 01/21/25  1554   POC GLUCOSE mg/dl 151* 98 153* 78             Results from last 7 days   Lab Units 01/22/25  0451 01/20/25  1350   LACTIC ACID mmol/L  --  1.6   PROCALCITONIN ng/ml 0.09  --            Results from last 7 days   Lab Units 01/20/25  1610   COLOR UA  Yellow   CLARITY UA  Cloudy   SPEC GRAV UA  1.020   PH UA  6.5   LEUKOCYTES UA  Large*   NITRITE UA  Negative   GLUCOSE UA mg/dl 70 (7/100%)*   KETONES UA mg/dl Trace*   BLOOD UA  Small*     "  Results from last 7 days   Lab Units 01/20/25  1610   RBC UA /hpf 1-2   WBC UA /hpf 20-30*   EPITHELIAL CELLS WET PREP /hpf None Seen   BACTERIA UA /hpf Moderate*      Results from last 7 days   Lab Units 01/20/25  1610   URINE CULTURE  60,000-69,000 cfu/ml           Incidental Findings:      Test Results Pending at Discharge (will require follow up):      Reason for Admission:   Abdominal Pain (Pt coming in from home via EMS, c/o intermittent abd pain for a \"few day\", denies N/V/D)    Hospital Course:   Mathew Rico is a 74 y.o. male patient who originally presented to the hospital on 1/20/2025 due to abdominal pain.  He came to the hospital where it was noted he had a distended bladder and a suprapubic tube was clogged.  This was flushed and it was unclogged.  He was on empiric antibiotics and remained stable.  Culture with mixed contaminants and he is being discharged with antibiotics as outlined above.  [facility] to follow him upon discharge.    Please see above list of diagnoses and related plan for additional information.     Condition at Discharge: stable     Discharge Day Visit / Exam:   Subjective: Patient seen and examined.  Feeling well.    Vitals: Blood Pressure: 122/52 (01/22/25 0733)  Pulse: 68 (01/22/25 0733)  Temperature: 97.9 °F (36.6 °C) (01/22/25 0733)  Temp Source: Axillary (01/22/25 0733)  Respirations: 16 (01/21/25 2321)  Height: 5' 4\" (162.6 cm) (01/20/25 2300)  Weight - Scale: 64.1 kg (141 lb 5 oz) (01/20/25 2300)  SpO2: 95 % (01/22/25 0733)    Exam:   Physical Exam  Vitals reviewed.   Constitutional:       General: He is not in acute distress.  HENT:      Head: Atraumatic.   Cardiovascular:      Rate and Rhythm: Regular rhythm.      Heart sounds: Normal heart sounds.   Pulmonary:      Effort: Pulmonary effort is normal.      Breath sounds: Decreased breath sounds present. No wheezing.   Abdominal:      General: Bowel sounds are normal.      Palpations: Abdomen is soft.      " Tenderness: There is no abdominal tenderness.   Genitourinary:     Comments: Suprapubic tube in place  Musculoskeletal:         General: No swelling or tenderness.   Skin:     General: Skin is warm and dry.   Neurological:      Mental Status: He is alert. Mental status is at baseline.   Psychiatric:         Mood and Affect: Mood normal.       Discussion with Family: Brother on telephone    Discharge instructions/Information to patient and family:   See after visit summary for information provided to patient and family.      Provisions for Follow-Up Care:  See after visit summary for information related to follow-up care and any pertinent home health orders.      Mobility at time of Discharge:  Basic Mobility Inpatient Raw Score: 8  JH-HLM Goal: 3: Sit at edge of bed  JH-HLM Achieved: 2: Bed activities/Dependent transfer  JH-HLM Goal NOT achieved. Continue with multidisciplinary rounding and encourage appropriate mobility to improve upon JH-HLM goals.    Disposition:   Home with VNA Services (Reminder: Complete face to face encounter)    Planned Readmission: No     Discharge Medications:  See after visit summary for reconciled discharge medications provided to patient and family.      Administrative Statements   I spent 35 minutes discharging the patient. This time was spent on the day of discharge. I had direct contact with the patient on the day of discharge. Greater than 50% of the total time was spent examining patient, answering all patient questions, arranging and discussing plan of care with patient as well as directly providing post-discharge instructions.  Additional time then spent on discharge activities.    **Please Note: This note may have been constructed using a voice recognition system**

## 2025-01-22 NOTE — ASSESSMENT & PLAN NOTE
Lab Results   Component Value Date    HGBA1C 9.0 (H) 10/06/2024     Recent Labs     01/21/25  1554 01/21/25  2133 01/22/25  0753 01/22/25  1143   POCGLU 78 153* 98 151*     Was on sliding scale insulin during hospitalization

## 2025-01-22 NOTE — UTILIZATION REVIEW
Notification of Unplanned, Urgent, or   Emergency Inpatient Admission   AUTHORIZATION REQUEST   [place] [address]  Tax ID: [id number]  NPI: [id number]  Place of Service: Acute Care Hospital  Admission Level of Care: Inpatient  Place of Service Code: 21     Attending Physician Information  Attending Name and NPI#: Shon Mendez Do [[id number]]  Phone: [phone]     Admission Information  Inpatient Admission Date/Time: 1/20/25  5:27 PM  Discharge Date/Time: No discharge date for patient encounter.  Admitting Diagnosis Code/Description:  Abdominal pain [R10.9]  ARMANDO (acute kidney injury) ([facility]) [N17.9]  Obstruction of Cage catheter, initial encounter ([facility]) [T83.091A]     Utilization Review Contact  Marsha Garcia Utilization   Phone: [phone]  Fax: [phone]  Email: [email or web address]  Contact for approvals/pending authorizations, clinical reviews, and discharge.     Physician Advisory Services Contact  Medical Necessity Denial & Ghss-jz-Uprb Discussion  Phone: [phone]  Fax: [phone]  Email: [email or web address]     DISCHARGE SUPPORT TEAM:  For Patients Discharge Needs & Updates  Phone: [phone] opt. 2 Fax: [phone]  Email: [email or web address]

## 2025-01-22 NOTE — ASSESSMENT & PLAN NOTE
MS with a history of neurogenic bladder status post suprapubic tube  Continue baclofen and gabapentin   productive/infrequent/loose/good

## 2025-01-22 NOTE — OCCUPATIONAL THERAPY NOTE
Occupational Therapy Screen        Patient Name: Mathew Rico  Today's Date: 1/22/2025 01/22/25 0827   OT Last Visit   OT Visit Date 01/22/25   Note Type   Note type Screen   Additional Comments Pt Consult received. Chart reviewed. Screen completed. Pt remains bed bound and total dependent for all care. Not skilled OT warranted at this time. DC OT. Re-consult as appropriate.     Cris Mckeon, OT

## 2025-01-22 NOTE — CASE MANAGEMENT
Case Management Discharge Planning Note    Patient name Mathew Rico  Location [facility] 5 /E5 -* MRN [id number]  : 1950 Date 2025       Current Admission Date: 2025  Current Admission Diagnosis:Urinary obstruction   Patient Active Problem List    Diagnosis Date Noted Date Diagnosed    Urinary obstruction 2025     History of stroke 2024     Bladder wall thickening 2024     Pain of left hip 2024     Left leg pain 2024     Dizziness 2024     Pruritus 2024     Asymptomatic bacteriuria 2024     Blurred vision, bilateral 2024     Symptoms of upper respiratory infection (URI) 06/15/2024     Chest pain 2024     Acute encephalopathy 2024     GERSON on CPAP 2024     Fall 2024     Primary hypertension 2024     Type 2 diabetes mellitus without complication, without long-term current use of insulin ([facility]) 2024     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     Multiple sclerosis (HCC) 2024     Urinary retention 2024     Neck pain 2024     Vitamin B deficiency 2024     Generalized weakness 2024     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     UTI (urinary tract infection) 02/15/2024     Fall 2023     Weakness 2023     Accidental fall from chair 2023     Constipation 2023     Chronic diastolic congestive heart failure (HCC) 2023     Hyponatremia 2023     Excessive daytime sleepiness 2022     Shortness of breath 2022     Sepsis ([facility]) 2021     Pancreatic mass 2020     Pancreatic lesion 2020     Bladder stones 2019     Bladder neck contracture 2019     History of CVA (cerebrovascular accident) 10/21/2018     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HCC) 2017     Diabetic neuropathy (HCC) 2016     Neurogenic bladder 2016     Thyroid nodule 2015     Cervical spinal stenosis 2014     Generalized anxiety  disorder 07/13/2014     Obstructive sleep apnea 01/04/2013     Benign colon polyp 06/19/2012     Esophageal reflux 06/19/2012     Fatty liver 06/19/2012     Glaucoma 06/19/2012     Hyperlipidemia 06/19/2012     Multiple sclerosis (HCC) 06/19/2012     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) 06/19/2012     Primary hypertension 06/11/2012       LOS (days): 2  Geometric Mean LOS (GMLOS) (days): 3.2  Days to GMLOS:1.4     OBJECTIVE:  Risk of Unplanned Readmission Score: 53.11         Current admission status: Inpatient   Preferred Pharmacy:   [id number] [place] - [address]  [address] [facility] [address]  Phone: [phone] Fax: [phone]    [place], [place] - [address]  [address] [facility] [address]  Phone: [phone] Fax: [phone]    [facility] [place] - [address]  [facility],  [address]  [facility],  First [address] [place] [facility] [address]  Phone: [phone] Fax: [phone]     [facility] [place] - [address] [address]  Phone: [phone] Fax: [phone]    Primary Care Provider: Carolina Kumari MD    Primary Insurance: [facility]  Secondary Insurance:     DISCHARGE DETAILS:    Discharge planning discussed with::  Merly PÉREZ contacted family/caregiver?: Yes  Were Treatment Team discharge recommendations reviewed with patient/caregiver?: Yes  Did patient/caregiver verbalize understanding of patient care needs?: Yes  Were patient/caregiver advised of the risks associated with not following Treatment Team discharge recommendations?: Yes    Contacts  Patient Contacts: Guzman Rico  Relationship to Patient:: Family  Contact Method: Phone  Phone Number: [phone]  Reason/Outcome: Discharge Planning    Requested Home Health Care         Is the patient interested in HHC at discharge?: Yes  Home Health Discipline requested::  Nursing  Home Health Agency Name:: Other ([facility])  HHA External Referral Reason (only applicable if external HHA name selected): Patient has established relationship with provider  Home Health Follow-Up Provider:: PCP  Home Health Services Needed:: Urinary Incontinence Catheter Management  Homebound Criteria Met:: Requires the Assistance of Another Person for Safe Ambulation or to Leave the Home  Supporting Clincal Findings:: Limited Endurance, Bed Bound or Wheelchair Bound    DME Referral Provided  Referral made for DME?: No    Other Referral/Resources/Interventions Provided:  Interventions: C    Treatment Team Recommendation: Home with Home Health Care  Discharge Destination Plan:: Home with Home Health Care  Transport at Discharge : Misti flores          Additional Comments: Patient discharging to home, patient's brother Guzman agreeable to discharge plan. CM explained to Merly at [facility], patient needs more nursing services in the home to prevent frequent re-admissions for catheter. Misti Flores requested for 3pm today.

## 2025-01-22 NOTE — PHYSICAL THERAPY NOTE
PHYSICAL THERAPY SCREEN          Patient Name: Mathew Rico  Today's Date: 1/22/2025 01/22/25 1055   PT Last Visit   PT Visit Date 01/22/25   Note Type   Note type Screen   Additional Comments per chart review pt is dependent for all care. bed bound. prev require asim for oob transfers. no acute PT needs identified. will dc orders.       Roma Larose, PT

## 2025-01-23 ENCOUNTER — PROCEDURE VISIT (OUTPATIENT)
Dept: UROLOGY | Facility: CLINIC | Age: 75
End: 2025-01-23
Payer: MEDICARE

## 2025-01-23 VITALS — OXYGEN SATURATION: 95 % | HEART RATE: 83 BPM | DIASTOLIC BLOOD PRESSURE: 60 MMHG | SYSTOLIC BLOOD PRESSURE: 140 MMHG

## 2025-01-23 DIAGNOSIS — R39.9 UTI SYMPTOMS: Primary | ICD-10-CM

## 2025-01-23 LAB
BACTERIA UR QL AUTO: ABNORMAL /HPF
BILIRUB UR QL STRIP: NEGATIVE
CLARITY UR: ABNORMAL
COLOR UR: ABNORMAL
GLUCOSE UR STRIP-MCNC: ABNORMAL MG/DL
HGB UR QL STRIP.AUTO: ABNORMAL
KETONES UR STRIP-MCNC: ABNORMAL MG/DL
LEUKOCYTE ESTERASE UR QL STRIP: ABNORMAL
MUCOUS THREADS UR QL AUTO: ABNORMAL
NITRITE UR QL STRIP: NEGATIVE
NON-SQ EPI CELLS URNS QL MICRO: ABNORMAL /HPF
PH UR STRIP.AUTO: 6 [PH]
PROT UR STRIP-MCNC: ABNORMAL MG/DL
RBC #/AREA URNS AUTO: ABNORMAL /HPF
SP GR UR STRIP.AUTO: >=1.03 (ref 1–1.03)
UROBILINOGEN UR STRIP-ACNC: <2 MG/DL
WBC #/AREA URNS AUTO: ABNORMAL /HPF

## 2025-01-23 PROCEDURE — 81001 URINALYSIS AUTO W/SCOPE: CPT | Performed by: PHYSICIAN ASSISTANT

## 2025-01-23 PROCEDURE — 87106 FUNGI IDENTIFICATION YEAST: CPT | Performed by: PHYSICIAN ASSISTANT

## 2025-01-23 PROCEDURE — 51705 CHANGE OF BLADDER TUBE: CPT

## 2025-01-23 PROCEDURE — 87086 URINE CULTURE/COLONY COUNT: CPT | Performed by: PHYSICIAN ASSISTANT

## 2025-01-23 NOTE — PROGRESS NOTES
1/23/2025    Mathew Rioc  1950  [id number]    Diagnosis  Chief Complaint    Urinary Retention         Patient presents for SPT exchange managed by our office    Plan  Patient will follow up in 4 weeks     Procedure: Suprapubic Tube Change   Suprapubic Tube Placement    Date/Time: 1/23/2025 1:00 PM    Performed by: Sharon Bui RN  Authorized by: Javy Jeffries MD    Procedure details:     Complexity:  Simple    Catheter size:  20 Fr    Number of attempts:  1    Successful placement: yes      Urine characteristics:  Yellow and mildly cloudy  Post-procedure details:     Patient tolerance of procedure:  Tolerated well, no immediate complications    Complication (if applicable):  Attached to overnight bag  Sent urine for sample as cloudy and with sediment    Patient presents on stretcher  Current catheter removed without difficulty after deflation of an intact balloon. Site prepped with Hibiclens, new 20F  suprapubic spt change via aseptic technique without incident, 10 ml balloon inflated with sterile water. irrigated easily for  cloudy urine with sediment  return, no spasm noted. Patient tolerated well. Attached to drainage bag.            Sharon Bui RN

## 2025-01-23 NOTE — UTILIZATION REVIEW
NOTIFICATION OF ADMISSION DISCHARGE   This is a Notification of Discharge from [facility]. Please be advised that this patient has been discharge from our facility. Below you will find the admission and discharge date and time including the patient’s disposition.   UTILIZATION REVIEW CONTACT:  Marsha Garcia  Utilization   Network Utilization Review Department  Phone: [phone] x carefully listen to the prompts. All voicemails are confidential.  Email: [email or web address]     ADMISSION INFORMATION  PRESENTATION DATE: 1/20/2025  1:14 PM  OBERVATION ADMISSION DATE: N/A  INPATIENT ADMISSION DATE: 1/20/25  5:27 PM   DISCHARGE DATE: 1/22/2025  3:56 PM   DISPOSITION:Home/Self Care    Network Utilization Review Department  ATTENTION: Please call with any questions or concerns to [phone] and carefully listen to the prompts so that you are directed to the right person. All voicemails are confidential.   For Discharge needs, contact Care Management DC Support Team at [phone] opt. [phone]  Send all requests for admission clinical reviews, approved or denied determinations and any other requests to dedicated fax number below belonging to the campus where the patient is receiving treatment. List of dedicated fax numbers for the Facilities:  FACILITY NAME UR FAX NUMBER   ADMISSION DENIALS (Administrative/Medical Necessity) [phone]   DISCHARGE SUPPORT TEAM ([facility]) [phone]   PARENT CHILD HEALTH (Maternity/NICU/Pediatrics) [phone]   [facility] [phone]   [facility] [phone]   Critical access hospital [phone]   [facility] [phone]   Counts include 234 beds at the [facility] [phone]   [facility] [phone]   [facility] [phone]   [facility] [phone]    [facility] [phone]   [facility] [phone]   [facility] [phone]   [facility] [phone]

## 2025-01-24 ENCOUNTER — RESULTS FOLLOW-UP (OUTPATIENT)
Dept: UROLOGY | Facility: MEDICAL CENTER | Age: 75
End: 2025-01-24

## 2025-01-24 PROBLEM — A41.9 SEPSIS (HCC): Status: RESOLVED | Noted: 2021-06-02 | Resolved: 2025-01-24

## 2025-01-25 LAB
BACTERIA UR CULT: ABNORMAL
BACTERIA UR CULT: ABNORMAL

## 2025-01-27 VITALS
BODY MASS INDEX: 24.13 KG/M2 | OXYGEN SATURATION: 98 % | DIASTOLIC BLOOD PRESSURE: 69 MMHG | TEMPERATURE: 97.6 F | SYSTOLIC BLOOD PRESSURE: 108 MMHG | RESPIRATION RATE: 14 BRPM | HEIGHT: 64 IN | HEART RATE: 65 BPM | WEIGHT: 141.31 LBS

## 2025-02-10 ENCOUNTER — HOSPITAL ENCOUNTER (INPATIENT)
Facility: HOSPITAL | Age: 75
LOS: 15 days | Discharge: NURSING, REHAB OR LONG-TERM CARE | DRG: 698 | End: 2025-02-25
Attending: INTERNAL MEDICINE | Admitting: INTERNAL MEDICINE
Payer: MEDICARE

## 2025-02-10 ENCOUNTER — APPOINTMENT (EMERGENCY)
Dept: CT IMAGING | Facility: HOSPITAL | Age: 75
DRG: 698 | End: 2025-02-10
Payer: MEDICARE

## 2025-02-10 ENCOUNTER — APPOINTMENT (EMERGENCY)
Dept: RADIOLOGY | Facility: HOSPITAL | Age: 75
DRG: 698 | End: 2025-02-10
Payer: MEDICARE

## 2025-02-10 DIAGNOSIS — I10 PRIMARY HYPERTENSION: ICD-10-CM

## 2025-02-10 DIAGNOSIS — N39.0 UTI (URINARY TRACT INFECTION): ICD-10-CM

## 2025-02-10 DIAGNOSIS — Z93.59 SUPRAPUBIC CATHETER (HCC): ICD-10-CM

## 2025-02-10 DIAGNOSIS — R10.9 ABDOMINAL PAIN: Primary | ICD-10-CM

## 2025-02-10 LAB
2HR DELTA HS TROPONIN: 0 NG/L
ALBUMIN SERPL BCG-MCNC: 4 G/DL (ref 3.5–5)
ALP SERPL-CCNC: 75 U/L (ref 34–104)
ALT SERPL W P-5'-P-CCNC: 6 U/L (ref 7–52)
ANION GAP SERPL CALCULATED.3IONS-SCNC: 10 MMOL/L (ref 4–13)
APTT PPP: 27 SECONDS (ref 23–34)
AST SERPL W P-5'-P-CCNC: 11 U/L (ref 13–39)
BACTERIA UR QL AUTO: ABNORMAL /HPF
BASOPHILS # BLD AUTO: 0.11 THOUSANDS/ΜL (ref 0–0.1)
BASOPHILS NFR BLD AUTO: 1 % (ref 0–1)
BILIRUB SERPL-MCNC: 0.34 MG/DL (ref 0.2–1)
BILIRUB UR QL STRIP: NEGATIVE
BUN SERPL-MCNC: 17 MG/DL (ref 5–25)
CALCIUM SERPL-MCNC: 10.2 MG/DL (ref 8.4–10.2)
CARDIAC TROPONIN I PNL SERPL HS: 4 NG/L (ref ?–50)
CARDIAC TROPONIN I PNL SERPL HS: 4 NG/L (ref ?–50)
CHLORIDE SERPL-SCNC: 106 MMOL/L (ref 96–108)
CLARITY UR: ABNORMAL
CO2 SERPL-SCNC: 23 MMOL/L (ref 21–32)
COLOR UR: ABNORMAL
CREAT SERPL-MCNC: 0.84 MG/DL (ref 0.6–1.3)
EOSINOPHIL # BLD AUTO: 0.6 THOUSAND/ΜL (ref 0–0.61)
EOSINOPHIL NFR BLD AUTO: 4 % (ref 0–6)
ERYTHROCYTE [DISTWIDTH] IN BLOOD BY AUTOMATED COUNT: 16.4 % (ref 11.6–15.1)
GFR SERPL CREATININE-BSD FRML MDRD: 85 ML/MIN/1.73SQ M
GLUCOSE SERPL-MCNC: 87 MG/DL (ref 65–140)
GLUCOSE UR STRIP-MCNC: ABNORMAL MG/DL
HCT VFR BLD AUTO: 49.2 % (ref 36.5–49.3)
HGB BLD-MCNC: 15.4 G/DL (ref 12–17)
HGB UR QL STRIP.AUTO: ABNORMAL
IMM GRANULOCYTES # BLD AUTO: 0.04 THOUSAND/UL (ref 0–0.2)
IMM GRANULOCYTES NFR BLD AUTO: 0 % (ref 0–2)
INR PPP: 0.94 (ref 0.85–1.19)
KETONES UR STRIP-MCNC: ABNORMAL MG/DL
LACTATE SERPL-SCNC: 2.2 MMOL/L (ref 0.5–2)
LEUKOCYTE ESTERASE UR QL STRIP: ABNORMAL
LIPASE SERPL-CCNC: 34 U/L (ref 11–82)
LYMPHOCYTES # BLD AUTO: 3.01 THOUSANDS/ΜL (ref 0.6–4.47)
LYMPHOCYTES NFR BLD AUTO: 22 % (ref 14–44)
MCH RBC QN AUTO: 27.1 PG (ref 26.8–34.3)
MCHC RBC AUTO-ENTMCNC: 31.3 G/DL (ref 31.4–37.4)
MCV RBC AUTO: 87 FL (ref 82–98)
MONOCYTES # BLD AUTO: 1.06 THOUSAND/ΜL (ref 0.17–1.22)
MONOCYTES NFR BLD AUTO: 8 % (ref 4–12)
NEUTROPHILS # BLD AUTO: 8.77 THOUSANDS/ΜL (ref 1.85–7.62)
NEUTS SEG NFR BLD AUTO: 65 % (ref 43–75)
NITRITE UR QL STRIP: POSITIVE
NON-SQ EPI CELLS URNS QL MICRO: ABNORMAL /HPF
NRBC BLD AUTO-RTO: 0 /100 WBCS
PH UR STRIP.AUTO: 6 [PH]
PLATELET # BLD AUTO: 329 THOUSANDS/UL (ref 149–390)
PMV BLD AUTO: 8.4 FL (ref 8.9–12.7)
POTASSIUM SERPL-SCNC: 4.2 MMOL/L (ref 3.5–5.3)
PROCALCITONIN SERPL-MCNC: <0.05 NG/ML
PROT SERPL-MCNC: 7.8 G/DL (ref 6.4–8.4)
PROT UR STRIP-MCNC: ABNORMAL MG/DL
PROTHROMBIN TIME: 12.8 SECONDS (ref 12.3–15)
RBC # BLD AUTO: 5.68 MILLION/UL (ref 3.88–5.62)
RBC #/AREA URNS AUTO: ABNORMAL /HPF
SODIUM SERPL-SCNC: 139 MMOL/L (ref 135–147)
SP GR UR STRIP.AUTO: 1.02 (ref 1–1.03)
UROBILINOGEN UR STRIP-ACNC: <2 MG/DL
WBC # BLD AUTO: 13.59 THOUSAND/UL (ref 4.31–10.16)
WBC #/AREA URNS AUTO: ABNORMAL /HPF

## 2025-02-10 PROCEDURE — 99285 EMERGENCY DEPT VISIT HI MDM: CPT | Performed by: INTERNAL MEDICINE

## 2025-02-10 PROCEDURE — 96365 THER/PROPH/DIAG IV INF INIT: CPT

## 2025-02-10 PROCEDURE — 84484 ASSAY OF TROPONIN QUANT: CPT

## 2025-02-10 PROCEDURE — 83036 HEMOGLOBIN GLYCOSYLATED A1C: CPT | Performed by: NURSE PRACTITIONER

## 2025-02-10 PROCEDURE — 36415 COLL VENOUS BLD VENIPUNCTURE: CPT

## 2025-02-10 PROCEDURE — 80053 COMPREHEN METABOLIC PANEL: CPT

## 2025-02-10 PROCEDURE — 87040 BLOOD CULTURE FOR BACTERIA: CPT

## 2025-02-10 PROCEDURE — 71046 X-RAY EXAM CHEST 2 VIEWS: CPT

## 2025-02-10 PROCEDURE — 96367 TX/PROPH/DG ADDL SEQ IV INF: CPT

## 2025-02-10 PROCEDURE — 74177 CT ABD & PELVIS W/CONTRAST: CPT

## 2025-02-10 PROCEDURE — 85610 PROTHROMBIN TIME: CPT

## 2025-02-10 PROCEDURE — 84145 PROCALCITONIN (PCT): CPT

## 2025-02-10 PROCEDURE — 83605 ASSAY OF LACTIC ACID: CPT

## 2025-02-10 PROCEDURE — 81001 URINALYSIS AUTO W/SCOPE: CPT

## 2025-02-10 PROCEDURE — 87186 SC STD MICRODIL/AGAR DIL: CPT

## 2025-02-10 PROCEDURE — 85730 THROMBOPLASTIN TIME PARTIAL: CPT

## 2025-02-10 PROCEDURE — 85025 COMPLETE CBC W/AUTO DIFF WBC: CPT

## 2025-02-10 PROCEDURE — 93005 ELECTROCARDIOGRAM TRACING: CPT

## 2025-02-10 PROCEDURE — 87077 CULTURE AEROBIC IDENTIFY: CPT

## 2025-02-10 PROCEDURE — 87086 URINE CULTURE/COLONY COUNT: CPT

## 2025-02-10 PROCEDURE — 0T2BX0Z CHANGE DRAINAGE DEVICE IN BLADDER, EXTERNAL APPROACH: ICD-10-PCS | Performed by: INTERNAL MEDICINE

## 2025-02-10 PROCEDURE — 51705 CHANGE OF BLADDER TUBE: CPT | Performed by: INTERNAL MEDICINE

## 2025-02-10 PROCEDURE — 83690 ASSAY OF LIPASE: CPT

## 2025-02-10 PROCEDURE — 99285 EMERGENCY DEPT VISIT HI MDM: CPT

## 2025-02-10 RX ORDER — SODIUM CHLORIDE, SODIUM GLUCONATE, SODIUM ACETATE, POTASSIUM CHLORIDE, MAGNESIUM CHLORIDE, SODIUM PHOSPHATE, DIBASIC, AND POTASSIUM PHOSPHATE .53; .5; .37; .037; .03; .012; .00082 G/100ML; G/100ML; G/100ML; G/100ML; G/100ML; G/100ML; G/100ML
1000 INJECTION, SOLUTION INTRAVENOUS ONCE
Status: COMPLETED | OUTPATIENT
Start: 2025-02-10 | End: 2025-02-11

## 2025-02-10 RX ADMIN — IOHEXOL 100 ML: 350 INJECTION, SOLUTION INTRAVENOUS at 20:04

## 2025-02-10 RX ADMIN — SODIUM CHLORIDE, SODIUM GLUCONATE, SODIUM ACETATE, POTASSIUM CHLORIDE, MAGNESIUM CHLORIDE, SODIUM PHOSPHATE, DIBASIC, AND POTASSIUM PHOSPHATE 1000 ML: .53; .5; .37; .037; .03; .012; .00082 INJECTION, SOLUTION INTRAVENOUS at 22:48

## 2025-02-10 RX ADMIN — PIPERACILLIN AND TAZOBACTAM 4.5 G: 36; 4.5 INJECTION, POWDER, LYOPHILIZED, FOR SOLUTION INTRAVENOUS at 22:34

## 2025-02-10 NOTE — LETTER
2/25/25    Pt has been cleared by both [facility] and the [place] ORC for admission to the SNF.  He was sent to [facility] this day at 1130.  Please find the attached summary of care for his hospitalization.    Thank you    Jodee Couch RN CM

## 2025-02-10 NOTE — Clinical Note
Case was discussed with   and the patient's admission status was agreed to be Admission Status: inpatient status to the service of Dr. Cloud

## 2025-02-10 NOTE — ED PROVIDER NOTES
Time reflects when diagnosis was documented in both MDM as applicable and the Disposition within this note       Time User Action Codes Description Comment    2/10/2025 10:46 PM Parul Rene Add [R10.9] Abdominal pain     2/10/2025 10:47 PM Parul Rene Add [N39.0] UTI (urinary tract infection)     2/10/2025 11:36 PM Tania Reyes Add [Z93.59] Suprapubic catheter (HCC)           ED Disposition       ED Disposition   Admit    Condition   Stable    Date/Time   Mon Feb 10, 2025 11:36 PM    Comment   Case was discussed with Dr. Malin  and the patient's admission status was agreed to be Admission Status: inpatient status to the service of Dr. Malin  .               Assessment & Plan       Medical Decision Making  Amount and/or Complexity of Data Reviewed  Labs: ordered. Decision-making details documented in ED Course.  Radiology: ordered. Decision-making details documented in ED Course.    Risk  Prescription drug management.  Decision regarding hospitalization.      Patient is 74 y.o. male with PMH MS, neurogenic bladder with chronic suprapubic cathter, htn, hld, DM presenting for suprapubic pain. See history and physical documented below.       Impression: chronic indwelling suprapubic catheter with suprapubic pain. DDX: UTI, intraabdominal process, ACS. Will get sepsis workup given tachycardia and likely infection. CBC to r/o anemia or leukocytosis, CMP to r/o metabolic abnormality, UA to r/o UTI, troponin to r/o ACS, lactic to r/o ischemia. Will CT abdomen/pelvis to r/o intraabdominal pathology. Will change suprapubic catheter to obtain clean catch urine. Will give IV fluids. Likely will require ABX pending workup. Will likely require admission.     View ED course above for further discussion on patient workup.     On review of previous records suprapubic catheter last changed in urology office, 20F.     All labs reviewed and utilized in the medical decision making process  All radiology studies independently  viewed by me and interpreted by the radiologist.  I reviewed all testing with the patient.     Upon re-evaluation remains stable in ED. HR improved after fluids. UA grossly positive. Reviewed prior culture which was sensitive to zosyn, given dose in ED.     Discussed case with SLIM and admitted for urosepsis.         ED Course as of 02/11/25 0320   Mon Feb 10, 2025   1918 WBC(!): 13.59  Leukocytosis    1918 Hemoglobin: 15.4   1918 Platelet Count: 329   2020 hs TnI 0hr: 4   2020 PROTIME: 12.8   2020 POCT INR: 0.94   2020 PTT: 27   2020 LIPASE: 34   2020 Comprehensive metabolic panel(!)  Unremarkable    2125 Procalcitonin: <0.05   2125 CT abdomen pelvis with contrast  IMPRESSION:     Suprapubic catheter present. Chronic bladder wall thickening and perivesical stranding, mildly improved compared to January.     Mild thickening about the distal rectum, also. Chronic.     2133 Prior culture reviewed, susceptible to zosyn   2235 RBC Urine(!): Innumerable   2235 WBC, UA(!): Innumerable   2235 Bacteria, UA(!): Innumerable   2235 Leukocytes, UA(!): Large   2235 Nitrite, UA(!): Positive   2236 LACTIC ACID(!): 2.2  elevated       Medications   enoxaparin (LOVENOX) subcutaneous injection 40 mg (has no administration in time range)   acetaminophen (TYLENOL) tablet 650 mg (has no administration in time range)   oxyCODONE (ROXICODONE) IR tablet 5 mg (has no administration in time range)   ertapenem (INVanz) 1,000 mg in sodium chloride 0.9 % 50 mL IVPB (has no administration in time range)   mirtazapine (REMERON) tablet 7.5 mg (7.5 mg Oral Given 2/11/25 0206)   pantoprazole (PROTONIX) EC tablet 40 mg (has no administration in time range)   polyethylene glycol (MIRALAX) packet 17 g (has no administration in time range)   propranolol (INDERAL LA) 24 hr capsule 60 mg (has no administration in time range)   senna (SENOKOT) tablet 17.2 mg (has no administration in time range)   amLODIPine (NORVASC) tablet 10 mg (has no administration in  time range)   atorvastatin (LIPITOR) tablet 80 mg (has no administration in time range)   baclofen tablet 5 mg (5 mg Oral Given 2/11/25 0206)   budesonide-formoterol (SYMBICORT) 160-4.5 mcg/act inhaler 2 puff (has no administration in time range)   clopidogrel (PLAVIX) tablet 75 mg (has no administration in time range)   cyanocobalamin (VITAMIN B-12) tablet 500 mcg (has no administration in time range)   gabapentin (NEURONTIN) capsule 300 mg (has no administration in time range)   gabapentin (NEURONTIN) capsule 600 mg (600 mg Oral Given 2/11/25 0207)   latanoprost (XALATAN) 0.005 % ophthalmic solution 1 drop (has no administration in time range)   melatonin tablet 3 mg (has no administration in time range)   multi-electrolyte (PLASMALYTE-A/ISOLYTE-S PH 7.4) IV solution (0 mL/hr Intravenous Hold 2/11/25 0201)   insulin lispro (HumALOG/ADMELOG) 100 units/mL subcutaneous injection 1-5 Units (has no administration in time range)   insulin lispro (HumALOG/ADMELOG) 100 units/mL subcutaneous injection 1-5 Units (has no administration in time range)   iohexol (OMNIPAQUE) 350 MG/ML injection (MULTI-DOSE) 100 mL (100 mL Intravenous Given 2/10/25 2004)   piperacillin-tazobactam (ZOSYN) IVPB 4.5 g (0 g Intravenous Stopped 2/10/25 2307)   multi-electrolyte (ISOLYTE-S PH 7.4) bolus 1,000 mL (0 mL Intravenous Stopped 2/11/25 0003)   HYDROmorphone HCl (DILAUDID) injection 0.2 mg (0.2 mg Intravenous Given 2/11/25 0125)   sodium chloride 0.9 % bolus 1,000 mL (0 mL Intravenous Stopped 2/11/25 0242)       ED Risk Strat Scores                          SBIRT 20yo+      Flowsheet Row Most Recent Value   Initial Alcohol Screen: US AUDIT-C     1. How often do you have a drink containing alcohol? 0 Filed at: 02/10/2025 1842   2. How many drinks containing alcohol do you have on a typical day you are drinking?  0 Filed at: 02/10/2025 1842   3b. FEMALE Any Age, or MALE 65+: How often do you have 4 or more drinks on one occassion? 0 Filed at:  02/10/2025 1842   Audit-C Score 0 Filed at: 02/10/2025 1842   ALPESH: How many times in the past year have you...    Used an illegal drug or used a prescription medication for non-medical reasons? Never Filed at: 02/10/2025 1842                            History of Present Illness       Chief Complaint   Patient presents with    Abdominal Pain     Arrives via ems from home with c/o generalized abd pain ongoing since yesterday. States he did have a couple episodes of vomiting, but denies nausea now. Hx of MS. Denies sick contacts.         Past Medical History:   Diagnosis Date    Acute laryngitis     Acute nonsuppurative otitis media, unspecified laterality     Arm weakness     Arthritis     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HCC)     Bladder infection     Bronchitis     Constipation     Cough     Diabetes (HCC)     Diabetes mellitus (HCC)     Dizziness     Dysfunction of eustachian tube     Erectile dysfunction of non-organic origin     Fatigue     Glaucoma     Hiatal hernia     Hypertension     Imbalance     Leg muscle spasm     Leukocytosis 04/01/2024    MS (multiple sclerosis) (HCC)     Multiple sclerosis (HCC)     Nephrolithiasis     Neurogenic bladder     No natural teeth     Sinus pain     Spinal stenosis     Strain of thoracic region     Stroke ([facility])     Suprapubic catheter ([facility])       Past Surgical History:   Procedure Laterality Date    APPENDECTOMY      BRAIN SURGERY      Coil placed in aneurysm    CEREBRAL ANEURYSM REPAIR      CYSTOSCOPY      CYSTOSCOPY      CYSTOSCOPY  06/11/2018    CYSTOSCOPY  01/15/2021    EYE SURGERY      transscleral cyclophotocoagulation noncontact YAG laser    IR SUPRAPUBIC CATHETER CHECK/CHANGE/REINSERTION/UPSIZE  3/28/2024    MYRINGOTOMY      with ventilation tube insertion    MA LITHOLAPAXY SMPL/SM <2.5 CM N/A 5/7/2019    Procedure: CYSTOSCOPY, holmium laser litholapaxy of bladder stones, EXCHANGE OF SP TUBE;  Surgeon: Javy Jeffries MD;  Location: [facility];  Service: Urology     SUPRAPUBIC CATHETER INSERTION        Family History   Problem Relation Age of Onset    Heart attack Mother     Stroke Mother     Heart attack Father     Anuerysm Father         In Stomach     Aneurysm Father       Social History     Tobacco Use    Smoking status: Former     Current packs/day: 0.00     Average packs/day: 0.5 packs/day for 31.0 years (15.5 ttl pk-yrs)     Types: Cigarettes     Start date:      Quit date:      Years since quittin.1    Smokeless tobacco: Never   Vaping Use    Vaping status: Never Used   Substance Use Topics    Alcohol use: Not Currently    Drug use: No      E-Cigarette/Vaping    E-Cigarette Use Never User       E-Cigarette/Vaping Substances    Nicotine No     THC No     CBD No     Flavoring No     Other No     Unknown No       I have reviewed and agree with the history as documented.     HPI  Patient is 74 y.o. male with PMH MS, neurogenic bladder with chronic suprapubic cathter, htn, hld, DM presenting for suprapubic pain. Pain started yesterday, worse suprapubic but diffuse pain. Also has nausea without vomiting. Also reports intermittent chest pain.  Denies fever, chills, SOB. Poor historian generally, unsure last time suprapuibc was changed or if output different. Taken care of by family at home.     Review of Systems   Constitutional:  Negative for chills and fever.   HENT:  Negative for ear pain and sore throat.    Respiratory:  Negative for cough and shortness of breath.    Cardiovascular:  Positive for chest pain. Negative for palpitations and leg swelling.   Gastrointestinal:  Positive for abdominal pain and nausea. Negative for diarrhea and vomiting.   Musculoskeletal:  Negative for back pain and neck pain.   Skin:  Negative for rash.   Neurological:  Negative for dizziness, light-headedness and headaches.           Objective       ED Triage Vitals [02/10/25 1838]   Temperature Pulse Blood Pressure Respirations SpO2 Patient Position - Orthostatic VS   97.8 °F  (36.6 °C) (!) 110 141/75 17 96 % Lying      Temp Source Heart Rate Source BP Location FiO2 (%) Pain Score    Oral Monitor Right arm -- 7      Vitals      Date and Time Temp Pulse SpO2 Resp BP Pain Score FACES Pain Rating User   02/11/25 0230 -- 82 95 % 14 146/66 -- -- VF   02/11/25 0200 -- 83 94 % 13 113/55 -- -- VF   02/11/25 0145 -- 87 94 % 15 114/57 -- -- VF   02/11/25 0130 -- 92 96 % 20 138/67 -- -- AB   02/11/25 0125 -- 91 96 % 20 151/67 5 -- AB   02/11/25 0115 -- 91 95 % 20 141/66 -- -- VF   02/11/25 0100 -- 93 97 % 20 138/63 -- -- VF   02/11/25 0045 -- 91 97 % 20 141/63 -- -- VF   02/11/25 0030 -- 95 97 % 14 150/67 4 -- VF   02/10/25 2330 -- 97 95 % 20 158/73 -- -- VF   02/10/25 2315 -- 101 95 % 18 119/57 -- -- VF   02/10/25 2300 -- 102 94 % 19 128/58 -- -- VF   02/10/25 2245 -- 101 94 % 22 127/59 -- -- VF   02/10/25 2230 -- 104 94 % 22 142/71 -- -- VF   02/10/25 2200 -- 107 94 % 20 145/70 -- -- VF   02/10/25 2145 -- 106 94 % 19 138/65 -- -- VF   02/10/25 2130 -- 107 94 % 20 134/60 -- -- VF   02/10/25 2115 -- 105 94 % 19 155/73 -- -- VF   02/10/25 2100 -- 105 92 % 21 143/67 -- -- VF   02/10/25 2045 -- 107 94 % 18 150/68 -- -- VF   02/10/25 2030 -- 107 93 % 18 157/70 -- -- VF   02/10/25 2015 -- 105 94 % 17 169/74 -- -- VF   02/10/25 1838 97.8 °F (36.6 °C) 110 96 % 17 141/75 7 -- HW            Physical Exam  Vitals and nursing note reviewed.   Constitutional:       Comments: Chronically ill-appearing   HENT:      Head: Normocephalic and atraumatic.      Mouth/Throat:      Mouth: Mucous membranes are dry.   Eyes:      Extraocular Movements: Extraocular movements intact.      Conjunctiva/sclera: Conjunctivae normal.      Pupils: Pupils are equal, round, and reactive to light.   Cardiovascular:      Rate and Rhythm: Regular rhythm. Tachycardia present.      Heart sounds: No murmur heard.     No friction rub. No gallop.   Pulmonary:      Effort: Pulmonary effort is normal. No respiratory distress.      Breath  sounds: No wheezing or rales.   Abdominal:      Comments: Diffuse abdominal TTP, worse suprapubic, discharge around suprapubic cathter.    Neurological:      Mental Status: He is alert.         Results Reviewed       Procedure Component Value Units Date/Time    Hemoglobin A1c w/EAG Estimation (Prechecked if no A1C within 90 days) [[id number]] Collected: 02/10/25 1846    Lab Status: In process Specimen: Blood from Arm, Left Updated: 02/11/25 0309    Lactic acid, plasma (w/reflex if result > 2.0) [[id number]]     Lab Status: No result Specimen: Blood     Lactic acid 2 Hours [[id number]]  (Abnormal) Collected: 02/11/25 0027    Lab Status: Final result Specimen: Blood from Arm, Left Updated: 02/11/25 0047     LACTIC ACID 2.8 mmol/L     Narrative:      Result may be elevated if tourniquet was used during collection.    HS Troponin I 4hr [[id number]]  (Normal) Collected: 02/10/25 2348    Lab Status: Final result Specimen: Blood from Hand, Right Updated: 02/11/25 0015     hs TnI 4hr 4 ng/L      Delta 4hr hsTnI 0 ng/L     Blood culture #1 [[id number]] Collected: 02/10/25 1955    Lab Status: Preliminary result Specimen: Blood from Arm, Right Updated: 02/11/25 0001     Blood Culture Received in Microbiology Lab. Culture in Progress.    Blood culture #2 [[id number]] Collected: 02/10/25 1954    Lab Status: Preliminary result Specimen: Blood from Arm, Left Updated: 02/11/25 0001     Blood Culture Received in Microbiology Lab. Culture in Progress.    HS Troponin I 2hr [[id number]]  (Normal) Collected: 02/10/25 2214    Lab Status: Final result Specimen: Blood from Arm, Left Updated: 02/10/25 2241     hs TnI 2hr 4 ng/L      Delta 2hr hsTnI 0 ng/L     Lactic acid [[id number]]  (Abnormal) Collected: 02/10/25 2214    Lab Status: Final result Specimen: Blood from Arm, Left Updated: 02/10/25 2234     LACTIC ACID 2.2 mmol/L     Narrative:      Result may be elevated if tourniquet was used during collection.    Urine Microscopic [[id number]]   (Abnormal) Collected: 02/10/25 2131    Lab Status: Final result Specimen: Urine, Suprapubic catheter Updated: 02/10/25 2232     RBC, UA Innumerable /hpf      WBC, UA Innumerable /hpf      Epithelial Cells None Seen /hpf      Bacteria, UA Innumerable /hpf     Urine culture [[id number]] Collected: 02/10/25 2131    Lab Status: In process Specimen: Urine, Suprapubic catheter Updated: 02/10/25 2232    UA w Reflex to Microscopic w Reflex to Culture [[id number]]  (Abnormal) Collected: 02/10/25 2131    Lab Status: Final result Specimen: Urine, Suprapubic catheter Updated: 02/10/25 2229     Color, UA Light Orange     Clarity, UA Extra Turbid     Specific Gravity, UA 1.023     pH, UA 6.0     Leukocytes, UA Large     Nitrite, UA Positive     Protein,  (2+) mg/dl      Glucose,  (1/2%) mg/dl      Ketones, UA Trace mg/dl      Urobilinogen, UA <2.0 mg/dl      Bilirubin, UA Negative     Occult Blood, UA Moderate    Procalcitonin [[id number]]  (Normal) Collected: 02/10/25 1954    Lab Status: Final result Specimen: Blood from Arm, Left Updated: 02/10/25 2059     Procalcitonin <0.05 ng/ml     HS Troponin 0hr (reflex protocol) [[id number]]  (Normal) Collected: 02/10/25 1932    Lab Status: Final result Specimen: Blood from Arm, Left Updated: 02/10/25 2003     hs TnI 0hr 4 ng/L     Protime-INR [[id number]]  (Normal) Collected: 02/10/25 1932    Lab Status: Final result Specimen: Blood from Arm, Left Updated: 02/10/25 1952     Protime 12.8 seconds      INR 0.94    Narrative:      INR Therapeutic Range    Indication                                             INR Range      Atrial Fibrillation                                               2.0-3.0  Hypercoagulable State                                    2.0.2.3  Left Ventricular Asist Device                            2.0-3.0  Mechanical Heart Valve                                  -    Aortic(with afib, MI, embolism, HF, LA enlargement,    and/or coagulopathy)                                      2.0-3.0 (2.5-3.5)     Mitral                                                             2.5-3.5  Prosthetic/Bioprosthetic Heart Valve               2.0-3.0  Venous thromboembolism (VTE: VT, PE        2.0-3.0    APTT [[id number]]  (Normal) Collected: 02/10/25 1932    Lab Status: Final result Specimen: Blood from Arm, Left Updated: 02/10/25 1952     PTT 27 seconds     Comprehensive metabolic panel [[id number]]  (Abnormal) Collected: 02/10/25 1846    Lab Status: Final result Specimen: Blood from Arm, Left Updated: 02/10/25 1935     Sodium 139 mmol/L      Potassium 4.2 mmol/L      Chloride 106 mmol/L      CO2 23 mmol/L      ANION GAP 10 mmol/L      BUN 17 mg/dL      Creatinine 0.84 mg/dL      Glucose 87 mg/dL      Calcium 10.2 mg/dL      AST 11 U/L      ALT 6 U/L      Alkaline Phosphatase 75 U/L      Total Protein 7.8 g/dL      Albumin 4.0 g/dL      Total Bilirubin 0.34 mg/dL      eGFR 85 ml/min/1.73sq m     Narrative:      [employer] Kid[employer] Foundation guidelines for Chronic Kidney Disease (CKD):     Stage 1 with normal or high GFR (GFR > 90 mL/min/1.73 square meters)    Stage 2 Mild CKD (GFR = 60-89 mL/min/1.73 square meters)    Stage 3A Moderate CKD (GFR = 45-59 mL/min/1.73 square meters)    Stage 3B Moderate CKD (GFR = 30-44 mL/min/1.73 square meters)    Stage 4 Severe CKD (GFR = 15-29 mL/min/1.73 square meters)    Stage 5 End Stage CKD (GFR <15 mL/min/1.73 square meters)  Note: GFR calculation is accurate only with a steady state creatinine    Lipase [[id number]]  (Normal) Collected: 02/10/25 1846    Lab Status: Final result Specimen: Blood from Arm, Left Updated: 02/10/25 1935     Lipase 34 u/L     CBC and differential [[id number]]  (Abnormal) Collected: 02/10/25 1846    Lab Status: Final result Specimen: Blood from Arm, Left Updated: 02/10/25 1851     WBC 13.59 Thousand/uL      RBC 5.68 Million/uL      Hemoglobin 15.4 g/dL      Hematocrit 49.2 %      MCV 87 fL      MCH 27.1 pg      MCHC 31.3 g/dL       RDW 16.4 %      MPV 8.4 fL      Platelets 329 Thousands/uL      nRBC 0 /100 WBCs      Segmented % 65 %      Immature Grans % 0 %      Lymphocytes % 22 %      Monocytes % 8 %      Eosinophils Relative 4 %      Basophils Relative 1 %      Absolute Neutrophils 8.77 Thousands/µL      Absolute Immature Grans 0.04 Thousand/uL      Absolute Lymphocytes 3.01 Thousands/µL      Absolute Monocytes 1.06 Thousand/µL      Eosinophils Absolute 0.60 Thousand/µL      Basophils Absolute 0.11 Thousands/µL             CT abdomen pelvis with contrast   Final Interpretation by Li Wiggins MD (02/10 2024)      Suprapubic catheter present. Chronic bladder wall thickening and perivesical stranding, mildly improved compared to January.      Mild thickening about the distal rectum, also. Chronic.      No new findings.         Workstation performed: [id number]         XR chest 2 views    (Results Pending)       Universal Protocol:  Procedure performed by: (Mera Leone PA-C)  Consent given by: patient  Timeout called at: 2/10/2025 9:31 PM.  Patient identity confirmed: verbally with patient    Bladder catheterization    Date/Time: 2/10/2025 9:31 PM    Performed by: Tania Reyes DO  Authorized by: Tania Reyes DO    Patient location:  ED  Consent:     Consent given by:  Patient  Universal protocol:     Patient identity confirmed:  Verbally with patient  Pre-procedure details:     Procedure purpose:  Diagnostic  Procedure details:     Bladder irrigation: no      Catheter insertion: suprapubic.    Approach: percutaneous      Catheter type:  Cage    Catheter size:  20 Fr    Number of attempts:  1    Successful placement: yes      Urine characteristics:  Cloudy    Procedure performed by provider due to: suprapubic.  Post-procedure details:     Patient tolerance of procedure:  Tolerated well, no immediate complications      ED Medication and Procedure Management   Prior to Admission Medications   Prescriptions Last  Dose Informant Patient Reported? Taking?   Empagliflozin (JARDIANCE) 10 MG TABS tablet  Outside Facility (Specify) Yes No   Sig: Take 10 mg by mouth every morning   Ergocalciferol (VITAMIN D2 PO)  Outside Facility (Specify) Yes Yes   Sig: Take 50,000 Units by mouth once a week   acetaminophen (TYLENOL) 325 mg tablet  Outside Facility (Specify) No No   Sig: Take 2 tablets (650 mg total) by mouth every 6 (six) hours as needed for mild pain   albuterol (2.5 mg/3 mL) 0.083 % nebulizer solution  Outside Facility (Specify) No No   Sig: Take 3 mL (2.5 mg total) by nebulization every 6 (six) hours as needed for wheezing or shortness of breath   amLODIPine-atorvastatin (CADUET) 10-80 MG per tablet  Outside Facility (Specify) Yes Yes   Sig: Take 1 tablet by mouth daily   atorvastatin (LIPITOR) 80 mg tablet   Yes No   Sig: Take 80 mg by mouth at bedtime   baclofen 10 mg tablet  Outside Facility (Specify) Yes Yes   Sig: Take 5 mg by mouth 3 (three) times a day   bisacodyl (DULCOLAX) 10 mg suppository  Outside Facility (Specify) No Yes   Sig: Insert 1 suppository (10 mg total) into the rectum daily as needed for constipation for up to 12 doses   budesonide-formoterol (SYMBICORT) 160-4.5 mcg/act inhaler  Outside Facility (Specify) Yes Yes   Sig: Inhale 2 puffs 2 (two) times a day Rinse mouth after use.   clopidogrel (PLAVIX) 75 mg tablet  Outside Facility (Specify) No No   Sig: Take 1 tablet (75 mg total) by mouth daily   cromolyn (NASALCHROM) 5.2 MG/ACT nasal spray  Outside Facility (Specify) No Yes   Si spray into each nostril 3 (three) times a day   cyanocobalamin (VITAMIN B-12) 500 MCG tablet  Outside Facility (Specify) No Yes   Sig: Take 1 tablet (500 mcg total) by mouth daily   furosemide (LASIX) 40 mg tablet   Yes No   Sig: Take 40 mg by mouth daily   gabapentin (NEURONTIN) 300 mg capsule   No No   Sig: Take 1 capsule (300 mg total) by mouth 3 (three) times a day   gabapentin (NEURONTIN) 300 mg capsule   No No   Sig:  Take 2 capsules (600 mg total) by mouth daily at bedtime   latanoprost (XALATAN) 0.005 % ophthalmic solution  Outside Facility (Specify) Yes Yes   Sig: Administer 1 drop to both eyes daily at bedtime   losartan (COZAAR) 50 mg tablet   Yes No   Sig: Take 50 mg by mouth daily   meclizine (ANTIVERT) 12.5 MG tablet  Outside Facility (Specify) No No   Sig: Take 1 tablet (12.5 mg total) by mouth every 8 (eight) hours as needed for dizziness   melatonin 3 mg  Outside Facility (Specify) Yes Yes   Sig: Take 3 mg by mouth daily at bedtime as needed   metFORMIN (GLUCOPHAGE) 1000 MG tablet  Outside Facility (Specify) Yes No   Sig: Take 1,000 mg by mouth daily with breakfast   methenamine hippurate (HIPREX) 1 g tablet  Outside Facility (Specify) No No   Sig: Take 1 tablet (1 g total) by mouth 2 (two) times a day with meals   mirtazapine (REMERON) 7.5 MG tablet  Outside Facility (Specify) Yes Yes   Sig: Take 7.5 mg by mouth daily at bedtime   pantoprazole (PROTONIX) 40 mg tablet  Outside Facility (Specify) Yes Yes   Sig: Take 40 mg by mouth daily   polyethylene glycol (MIRALAX) 17 g packet  Outside Facility (Specify) No No   Sig: Take 17 g by mouth 2 (two) times a day   potassium chloride (Klor-Con M10) 10 mEq tablet   Yes No   Sig: Take 10 mEq by mouth 2 (two) times a day   propranolol (INDERAL LA) 60 mg 24 hr capsule  Outside Facility (Specify) Yes Yes   Sig: Take 60 mg by mouth daily   senna-docusate sodium (SENOKOT S) 8.6-50 mg per tablet  Outside Facility (Specify) No Yes   Sig: Take 2 tablets by mouth daily at bedtime   sitaGLIPtin (JANUVIA) 100 mg tablet   Yes No   Sig: Take 100 mg by mouth daily      Facility-Administered Medications: None     Patient's Medications   Discharge Prescriptions    No medications on file     No discharge procedures on file.  ED SEPSIS DOCUMENTATION   Time reflects when diagnosis was documented in both MDM as applicable and the Disposition within this note       Time User Action Codes  "Description Comment    2/10/2025 10:46 PM Parul Rene Add [R10.9] Abdominal pain     2/10/2025 10:47 PM Parul Rene Add [N39.0] UTI (urinary tract infection)     2/10/2025 11:36 PM Tania Reyes Add [Z93.59] Suprapubic catheter (HCC)            Initial Sepsis Screening       Row Name 02/11/25 0115 02/10/25 2302 02/10/25 1918          Is the patient's history suggestive of a new or worsening infection? Yes (Proceed)  - -- Yes (Proceed)  -MM      Suspected source of infection urinary tract infection  - -- urinary tract infection  -MM      Indicate SIRS criteria Tachycardia > 90 bpm;Tachypnea > 20 resp per min;Leukocytosis (WBC > 22666 IJL) OR Leukopenia (WBC <4000 IJL) OR Bandemia (WBC >10% bands)  - -- Tachycardia > 90 bpm;Leukocytosis (WBC > 19514 IJL) OR Leukopenia (WBC <4000 IJL) OR Bandemia (WBC >10% bands)  -MM      Are two or more of the above signs & symptoms of infection both present and new to the patient? Yes (Proceed)  - -- Yes (Proceed)  -MM      Assess for evidence of organ dysfunction: Are any of the below criteria present within 6 hours of suspected infection and SIRS criteria that are NOT considered to be chronic conditions? -- Lactate > 2.0  -MM --      Date of presentation of severe sepsis -- 02/10/25  -MM --      Time of presentation of severe sepsis -- 2234  -MM --      Sepsis Note: Click \"NEXT\" below (NOT \"close\") to generate sepsis note based on above information. -- YES (proceed by clicking \"NEXT\")  -MM --                User Key  (r) = Recorded By, (t) = Taken By, (c) = Cosigned By      Initials Name Provider Type     SHA Heck Nurse Practitioner    MM Tania Reyes,  Resident                       Tania Reyes, DO  02/11/25 0257       Tania Reyes, DO  02/11/25 0320    "

## 2025-02-11 PROBLEM — R65.20 SEVERE SEPSIS (HCC): Status: ACTIVE | Noted: 2021-06-02

## 2025-02-11 PROBLEM — R10.9 ABDOMINAL PAIN: Status: ACTIVE | Noted: 2025-02-11

## 2025-02-11 LAB
4HR DELTA HS TROPONIN: 0 NG/L
ANION GAP SERPL CALCULATED.3IONS-SCNC: 9 MMOL/L (ref 4–13)
ATRIAL RATE: 107 BPM
ATRIAL RATE: 107 BPM
ATRIAL RATE: 97 BPM
BASOPHILS # BLD AUTO: 0.09 THOUSANDS/ΜL (ref 0–0.1)
BASOPHILS NFR BLD AUTO: 1 % (ref 0–1)
BUN SERPL-MCNC: 13 MG/DL (ref 5–25)
CALCIUM SERPL-MCNC: 8.7 MG/DL (ref 8.4–10.2)
CARDIAC TROPONIN I PNL SERPL HS: 4 NG/L (ref ?–50)
CHLORIDE SERPL-SCNC: 108 MMOL/L (ref 96–108)
CO2 SERPL-SCNC: 20 MMOL/L (ref 21–32)
CREAT SERPL-MCNC: 0.68 MG/DL (ref 0.6–1.3)
EOSINOPHIL # BLD AUTO: 0.5 THOUSAND/ΜL (ref 0–0.61)
EOSINOPHIL NFR BLD AUTO: 4 % (ref 0–6)
ERYTHROCYTE [DISTWIDTH] IN BLOOD BY AUTOMATED COUNT: 17.2 % (ref 11.6–15.1)
EST. AVERAGE GLUCOSE BLD GHB EST-MCNC: 134 MG/DL
GFR SERPL CREATININE-BSD FRML MDRD: 93 ML/MIN/1.73SQ M
GLUCOSE SERPL-MCNC: 119 MG/DL (ref 65–140)
GLUCOSE SERPL-MCNC: 123 MG/DL (ref 65–140)
GLUCOSE SERPL-MCNC: 131 MG/DL (ref 65–140)
GLUCOSE SERPL-MCNC: 75 MG/DL (ref 65–140)
GLUCOSE SERPL-MCNC: 95 MG/DL (ref 65–140)
HBA1C MFR BLD: 6.3 %
HCT VFR BLD AUTO: 41.1 % (ref 36.5–49.3)
HGB BLD-MCNC: 13.1 G/DL (ref 12–17)
IMM GRANULOCYTES # BLD AUTO: 0.05 THOUSAND/UL (ref 0–0.2)
IMM GRANULOCYTES NFR BLD AUTO: 0 % (ref 0–2)
LACTATE SERPL-SCNC: 2.1 MMOL/L (ref 0.5–2)
LACTATE SERPL-SCNC: 2.8 MMOL/L (ref 0.5–2)
LYMPHOCYTES # BLD AUTO: 3.03 THOUSANDS/ΜL (ref 0.6–4.47)
LYMPHOCYTES NFR BLD AUTO: 24 % (ref 14–44)
MAGNESIUM SERPL-MCNC: 1.5 MG/DL (ref 1.9–2.7)
MCH RBC QN AUTO: 27.1 PG (ref 26.8–34.3)
MCHC RBC AUTO-ENTMCNC: 31.9 G/DL (ref 31.4–37.4)
MCV RBC AUTO: 85 FL (ref 82–98)
MONOCYTES # BLD AUTO: 0.99 THOUSAND/ΜL (ref 0.17–1.22)
MONOCYTES NFR BLD AUTO: 8 % (ref 4–12)
NEUTROPHILS # BLD AUTO: 8.19 THOUSANDS/ΜL (ref 1.85–7.62)
NEUTS SEG NFR BLD AUTO: 63 % (ref 43–75)
NRBC BLD AUTO-RTO: 0 /100 WBCS
P AXIS: 53 DEGREES
P AXIS: 60 DEGREES
P AXIS: 61 DEGREES
PLATELET # BLD AUTO: 284 THOUSANDS/UL (ref 149–390)
PMV BLD AUTO: 9 FL (ref 8.9–12.7)
POTASSIUM SERPL-SCNC: 5 MMOL/L (ref 3.5–5.3)
PR INTERVAL: 160 MS
PR INTERVAL: 162 MS
PR INTERVAL: 172 MS
QRS AXIS: 57 DEGREES
QRS AXIS: 58 DEGREES
QRS AXIS: 62 DEGREES
QRSD INTERVAL: 92 MS
QRSD INTERVAL: 92 MS
QRSD INTERVAL: 94 MS
QT INTERVAL: 356 MS
QT INTERVAL: 360 MS
QT INTERVAL: 370 MS
QTC INTERVAL: 469 MS
QTC INTERVAL: 475 MS
QTC INTERVAL: 480 MS
RBC # BLD AUTO: 4.83 MILLION/UL (ref 3.88–5.62)
SODIUM SERPL-SCNC: 137 MMOL/L (ref 135–147)
T WAVE AXIS: 36 DEGREES
T WAVE AXIS: 42 DEGREES
T WAVE AXIS: 45 DEGREES
VENTRICULAR RATE: 107 BPM
VENTRICULAR RATE: 107 BPM
VENTRICULAR RATE: 97 BPM
WBC # BLD AUTO: 12.85 THOUSAND/UL (ref 4.31–10.16)

## 2025-02-11 PROCEDURE — 85025 COMPLETE CBC W/AUTO DIFF WBC: CPT | Performed by: NURSE PRACTITIONER

## 2025-02-11 PROCEDURE — 93010 ELECTROCARDIOGRAM REPORT: CPT | Performed by: INTERNAL MEDICINE

## 2025-02-11 PROCEDURE — 80048 BASIC METABOLIC PNL TOTAL CA: CPT | Performed by: NURSE PRACTITIONER

## 2025-02-11 PROCEDURE — 36415 COLL VENOUS BLD VENIPUNCTURE: CPT

## 2025-02-11 PROCEDURE — 83605 ASSAY OF LACTIC ACID: CPT

## 2025-02-11 PROCEDURE — 83605 ASSAY OF LACTIC ACID: CPT | Performed by: NURSE PRACTITIONER

## 2025-02-11 PROCEDURE — 82948 REAGENT STRIP/BLOOD GLUCOSE: CPT

## 2025-02-11 PROCEDURE — 99223 1ST HOSP IP/OBS HIGH 75: CPT | Performed by: INTERNAL MEDICINE

## 2025-02-11 PROCEDURE — 83735 ASSAY OF MAGNESIUM: CPT | Performed by: NURSE PRACTITIONER

## 2025-02-11 RX ORDER — FUROSEMIDE 40 MG/1
40 TABLET ORAL DAILY
COMMUNITY

## 2025-02-11 RX ORDER — GABAPENTIN 300 MG/1
300 CAPSULE ORAL 2 TIMES DAILY
Status: DISCONTINUED | OUTPATIENT
Start: 2025-02-12 | End: 2025-02-25 | Stop reason: HOSPADM

## 2025-02-11 RX ORDER — ENOXAPARIN SODIUM 100 MG/ML
40 INJECTION SUBCUTANEOUS DAILY
Status: DISCONTINUED | OUTPATIENT
Start: 2025-02-11 | End: 2025-02-25 | Stop reason: HOSPADM

## 2025-02-11 RX ORDER — ATORVASTATIN CALCIUM 80 MG/1
80 TABLET, FILM COATED ORAL
Status: DISCONTINUED | OUTPATIENT
Start: 2025-02-11 | End: 2025-02-25 | Stop reason: HOSPADM

## 2025-02-11 RX ORDER — HYDROMORPHONE HCL IN WATER/PF 6 MG/30 ML
0.2 PATIENT CONTROLLED ANALGESIA SYRINGE INTRAVENOUS ONCE
Status: COMPLETED | OUTPATIENT
Start: 2025-02-11 | End: 2025-02-11

## 2025-02-11 RX ORDER — PANTOPRAZOLE SODIUM 40 MG/1
40 TABLET, DELAYED RELEASE ORAL
Status: DISCONTINUED | OUTPATIENT
Start: 2025-02-11 | End: 2025-02-25 | Stop reason: HOSPADM

## 2025-02-11 RX ORDER — LATANOPROST 50 UG/ML
1 SOLUTION/ DROPS OPHTHALMIC
Status: DISCONTINUED | OUTPATIENT
Start: 2025-02-11 | End: 2025-02-25 | Stop reason: HOSPADM

## 2025-02-11 RX ORDER — INSULIN LISPRO 100 [IU]/ML
1-5 INJECTION, SOLUTION INTRAVENOUS; SUBCUTANEOUS
Status: DISCONTINUED | OUTPATIENT
Start: 2025-02-11 | End: 2025-02-25

## 2025-02-11 RX ORDER — BACLOFEN 10 MG/1
5 TABLET ORAL 3 TIMES DAILY
Status: DISCONTINUED | OUTPATIENT
Start: 2025-02-11 | End: 2025-02-25 | Stop reason: HOSPADM

## 2025-02-11 RX ORDER — BUDESONIDE AND FORMOTEROL FUMARATE DIHYDRATE 160; 4.5 UG/1; UG/1
2 AEROSOL RESPIRATORY (INHALATION) 2 TIMES DAILY
Status: DISCONTINUED | OUTPATIENT
Start: 2025-02-11 | End: 2025-02-25 | Stop reason: HOSPADM

## 2025-02-11 RX ORDER — POTASSIUM CHLORIDE 750 MG/1
10 TABLET, EXTENDED RELEASE ORAL 2 TIMES DAILY
COMMUNITY

## 2025-02-11 RX ORDER — SODIUM CHLORIDE, SODIUM GLUCONATE, SODIUM ACETATE, POTASSIUM CHLORIDE, MAGNESIUM CHLORIDE, SODIUM PHOSPHATE, DIBASIC, AND POTASSIUM PHOSPHATE .53; .5; .37; .037; .03; .012; .00082 G/100ML; G/100ML; G/100ML; G/100ML; G/100ML; G/100ML; G/100ML
100 INJECTION, SOLUTION INTRAVENOUS CONTINUOUS
Status: DISCONTINUED | OUTPATIENT
Start: 2025-02-11 | End: 2025-02-11

## 2025-02-11 RX ORDER — OXYCODONE HYDROCHLORIDE 5 MG/1
5 TABLET ORAL EVERY 6 HOURS PRN
Refills: 0 | Status: DISCONTINUED | OUTPATIENT
Start: 2025-02-11 | End: 2025-02-25 | Stop reason: HOSPADM

## 2025-02-11 RX ORDER — PROPRANOLOL HYDROCHLORIDE 60 MG/1
60 CAPSULE, EXTENDED RELEASE ORAL DAILY
Status: DISCONTINUED | OUTPATIENT
Start: 2025-02-12 | End: 2025-02-25 | Stop reason: HOSPADM

## 2025-02-11 RX ORDER — POLYETHYLENE GLYCOL 3350 17 G/17G
17 POWDER, FOR SOLUTION ORAL DAILY
Status: DISCONTINUED | OUTPATIENT
Start: 2025-02-11 | End: 2025-02-25 | Stop reason: HOSPADM

## 2025-02-11 RX ORDER — SENNOSIDES 8.6 MG
2 TABLET ORAL
Status: DISCONTINUED | OUTPATIENT
Start: 2025-02-11 | End: 2025-02-25 | Stop reason: HOSPADM

## 2025-02-11 RX ORDER — PROPRANOLOL HYDROCHLORIDE 60 MG/1
60 CAPSULE, EXTENDED RELEASE ORAL DAILY
Status: DISCONTINUED | OUTPATIENT
Start: 2025-02-11 | End: 2025-02-11

## 2025-02-11 RX ORDER — MIRTAZAPINE 7.5 MG/1
7.5 TABLET, FILM COATED ORAL
Status: DISCONTINUED | OUTPATIENT
Start: 2025-02-11 | End: 2025-02-25 | Stop reason: HOSPADM

## 2025-02-11 RX ORDER — MAGNESIUM SULFATE HEPTAHYDRATE 40 MG/ML
2 INJECTION, SOLUTION INTRAVENOUS ONCE
Status: COMPLETED | OUTPATIENT
Start: 2025-02-11 | End: 2025-02-11

## 2025-02-11 RX ORDER — LOSARTAN POTASSIUM 50 MG/1
50 TABLET ORAL DAILY
COMMUNITY

## 2025-02-11 RX ORDER — AMLODIPINE BESYLATE 10 MG/1
10 TABLET ORAL DAILY
Status: DISCONTINUED | OUTPATIENT
Start: 2025-02-11 | End: 2025-02-25 | Stop reason: HOSPADM

## 2025-02-11 RX ORDER — CLOPIDOGREL BISULFATE 75 MG/1
75 TABLET ORAL DAILY
Status: DISCONTINUED | OUTPATIENT
Start: 2025-02-11 | End: 2025-02-25 | Stop reason: HOSPADM

## 2025-02-11 RX ORDER — ATORVASTATIN CALCIUM 80 MG/1
80 TABLET, FILM COATED ORAL
COMMUNITY

## 2025-02-11 RX ORDER — GABAPENTIN 300 MG/1
300 CAPSULE ORAL 3 TIMES DAILY
Status: DISCONTINUED | OUTPATIENT
Start: 2025-02-11 | End: 2025-02-11

## 2025-02-11 RX ORDER — SODIUM CHLORIDE, SODIUM GLUCONATE, SODIUM ACETATE, POTASSIUM CHLORIDE, MAGNESIUM CHLORIDE, SODIUM PHOSPHATE, DIBASIC, AND POTASSIUM PHOSPHATE .53; .5; .37; .037; .03; .012; .00082 G/100ML; G/100ML; G/100ML; G/100ML; G/100ML; G/100ML; G/100ML
75 INJECTION, SOLUTION INTRAVENOUS CONTINUOUS
Status: DISCONTINUED | OUTPATIENT
Start: 2025-02-11 | End: 2025-02-11

## 2025-02-11 RX ORDER — ACETAMINOPHEN 325 MG/1
650 TABLET ORAL EVERY 6 HOURS PRN
Status: DISCONTINUED | OUTPATIENT
Start: 2025-02-11 | End: 2025-02-25 | Stop reason: HOSPADM

## 2025-02-11 RX ORDER — GABAPENTIN 300 MG/1
600 CAPSULE ORAL
Status: DISCONTINUED | OUTPATIENT
Start: 2025-02-11 | End: 2025-02-25 | Stop reason: HOSPADM

## 2025-02-11 RX ORDER — SODIUM CHLORIDE 9 MG/ML
100 INJECTION, SOLUTION INTRAVENOUS CONTINUOUS
Status: DISCONTINUED | OUTPATIENT
Start: 2025-02-11 | End: 2025-02-12

## 2025-02-11 RX ADMIN — GABAPENTIN 300 MG: 300 CAPSULE ORAL at 17:02

## 2025-02-11 RX ADMIN — GABAPENTIN 600 MG: 300 CAPSULE ORAL at 21:41

## 2025-02-11 RX ADMIN — ERTAPENEM SODIUM 1000 MG: 1 INJECTION INTRAMUSCULAR; INTRAVENOUS at 04:12

## 2025-02-11 RX ADMIN — ENOXAPARIN SODIUM 40 MG: 40 INJECTION SUBCUTANEOUS at 09:32

## 2025-02-11 RX ADMIN — PANTOPRAZOLE SODIUM 40 MG: 40 TABLET, DELAYED RELEASE ORAL at 05:19

## 2025-02-11 RX ADMIN — SENNOSIDES 17.2 MG: 8.6 TABLET ORAL at 21:41

## 2025-02-11 RX ADMIN — BACLOFEN 5 MG: 10 TABLET ORAL at 09:29

## 2025-02-11 RX ADMIN — HYDROMORPHONE HYDROCHLORIDE 0.2 MG: 0.2 INJECTION, SOLUTION INTRAMUSCULAR; INTRAVENOUS; SUBCUTANEOUS at 01:25

## 2025-02-11 RX ADMIN — LATANOPROST 1 DROP: 50 SOLUTION OPHTHALMIC at 21:42

## 2025-02-11 RX ADMIN — BUDESONIDE AND FORMOTEROL FUMARATE DIHYDRATE 2 PUFF: 160; 4.5 AEROSOL RESPIRATORY (INHALATION) at 17:04

## 2025-02-11 RX ADMIN — MIRTAZAPINE 7.5 MG: 7.5 TABLET, FILM COATED ORAL at 21:41

## 2025-02-11 RX ADMIN — GABAPENTIN 300 MG: 300 CAPSULE ORAL at 09:28

## 2025-02-11 RX ADMIN — GABAPENTIN 600 MG: 300 CAPSULE ORAL at 02:07

## 2025-02-11 RX ADMIN — ATORVASTATIN CALCIUM 80 MG: 80 TABLET, FILM COATED ORAL at 17:02

## 2025-02-11 RX ADMIN — BACLOFEN 5 MG: 10 TABLET ORAL at 17:02

## 2025-02-11 RX ADMIN — POLYETHYLENE GLYCOL 3350 17 G: 17 POWDER, FOR SOLUTION ORAL at 09:28

## 2025-02-11 RX ADMIN — BUDESONIDE AND FORMOTEROL FUMARATE DIHYDRATE 2 PUFF: 160; 4.5 AEROSOL RESPIRATORY (INHALATION) at 09:30

## 2025-02-11 RX ADMIN — MIRTAZAPINE 7.5 MG: 7.5 TABLET, FILM COATED ORAL at 02:06

## 2025-02-11 RX ADMIN — MAGNESIUM SULFATE HEPTAHYDRATE 2 G: 40 INJECTION, SOLUTION INTRAVENOUS at 07:07

## 2025-02-11 RX ADMIN — SODIUM CHLORIDE 100 ML/HR: 0.9 INJECTION, SOLUTION INTRAVENOUS at 16:56

## 2025-02-11 RX ADMIN — SODIUM CHLORIDE 100 ML/HR: 0.9 INJECTION, SOLUTION INTRAVENOUS at 08:19

## 2025-02-11 RX ADMIN — Medication 500 MCG: at 09:29

## 2025-02-11 RX ADMIN — BACLOFEN 5 MG: 10 TABLET ORAL at 02:06

## 2025-02-11 RX ADMIN — BACLOFEN 5 MG: 10 TABLET ORAL at 21:41

## 2025-02-11 RX ADMIN — AMLODIPINE BESYLATE 10 MG: 10 TABLET ORAL at 09:29

## 2025-02-11 RX ADMIN — SODIUM CHLORIDE 1000 ML: 0.9 INJECTION, SOLUTION INTRAVENOUS at 01:24

## 2025-02-11 RX ADMIN — SODIUM CHLORIDE, SODIUM GLUCONATE, SODIUM ACETATE, POTASSIUM CHLORIDE, MAGNESIUM CHLORIDE, SODIUM PHOSPHATE, DIBASIC, AND POTASSIUM PHOSPHATE 75 ML/HR: .53; .5; .37; .037; .03; .012; .00082 INJECTION, SOLUTION INTRAVENOUS at 07:02

## 2025-02-11 RX ADMIN — CLOPIDOGREL BISULFATE 75 MG: 75 TABLET ORAL at 09:29

## 2025-02-11 NOTE — ED NOTES
Per SLIM, to give 1 bolus normal saline and recheck lactic acid after first bolus is complete.     Renee Camargo RN  02/11/25 0149

## 2025-02-11 NOTE — ED ATTENDING ATTESTATION
2/10/2025  I, Parul Rene MD, saw and evaluated the patient. I have discussed the patient with the resident/non-physician practitioner and agree with the resident's/non-physician practitioner's findings, Plan of Care, and MDM as documented in the resident's/non-physician practitioner's note, except where noted. All available labs and Radiology studies were reviewed.  I was present for key portions of any procedure(s) performed by the resident/non-physician practitioner and I was immediately available to provide assistance.       At this point I agree with the current assessment done in the Emergency Department.  I have conducted an independent evaluation of this patient a history and physical is as follows:    ED Course   34-year-old with complex past medical history including multiple sclerosis, neurogenic bladder, suprapubic catheter, basal artery aneurysm, stroke, diabetes mellitus, hypertension and spinal stenosis brought in by ambulance for evaluation of abdominal pain.  Patient reports abdominal pain started yesterday.  He reports associated episodes of nausea and vomiting.  No sick contacts at home. On exam the patient is awake, alert, and chronically ill-appearing.  Mucous membranes are moist. The patient's heart is tachycardic. No respiratory distress.  Abdomen diffusely tender to palpation, no guarding, no rebound, suprapubic catheter in place.  Moves all extremities. Patient neurologically nonfocal. No skin rashes. Back without deformities. MDM: Work-up to include blood work, CBC as marker of infectivity, hemoconcentration for hydration status, CMP to check for electrolytes, renal function, liver function, Lipase to check for pancreatitis, lactic acid check for signs of hypoperfusion, UA to check for UTI, chest x-ray to evaluate for pneumonia, pleural effusions,  EKG and troponin to evaluate for potential ACS component. CT scan to evaluate for potential intra-abdominal surgical  pathology.          Critical Care Time  Procedures

## 2025-02-11 NOTE — PLAN OF CARE
Problem: Potential for Falls  Goal: Patient will remain free of falls  Description: INTERVENTIONS:  - Educate patient/family on patient safety including physical limitations  - Instruct patient to call for assistance with activity   - Consult OT/PT to assist with strengthening/mobility   - Keep Call bell within reach  - Keep bed low and locked with side rails adjusted as appropriate  - Keep care items and personal belongings within reach  - Initiate and maintain comfort rounds  - Make Fall Risk Sign visible to staff  - Offer Toileting every 2 Hours, in advance of need  - Initiate/Maintain bed alarm  - Obtain necessary fall risk management equipment: socks  - Apply yellow socks and bracelet for high fall risk patients  - Consider moving patient to room near nurses station  Outcome: Progressing     Problem: PAIN - ADULT  Goal: Verbalizes/displays adequate comfort level or baseline comfort level  Description: Interventions:  - Encourage patient to monitor pain and request assistance  - Assess pain using appropriate pain scale  - Administer analgesics based on type and severity of pain and evaluate response  - Implement non-pharmacological measures as appropriate and evaluate response  - Consider cultural and social influences on pain and pain management  - Notify physician/advanced practitioner if interventions unsuccessful or patient reports new pain  Outcome: Progressing     Problem: INFECTION - ADULT  Goal: Absence or prevention of progression during hospitalization  Description: INTERVENTIONS:  - Assess and monitor for signs and symptoms of infection  - Monitor lab/diagnostic results  - Monitor all insertion sites, i.e. indwelling lines, tubes, and drains  - Monitor endotracheal if appropriate and nasal secretions for changes in amount and color  - [place] appropriate cooling/warming therapies per order  - Administer medications as ordered  - Instruct and encourage patient and family to use good hand hygiene  technique  - Identify and instruct in appropriate isolation precautions for identified infection/condition  Outcome: Progressing     Problem: DISCHARGE PLANNING  Goal: Discharge to home or other facility with appropriate resources  Description: INTERVENTIONS:  - Identify barriers to discharge w/patient and caregiver  - Arrange for needed discharge resources and transportation as appropriate  - Identify discharge learning needs (meds, wound care, etc.)  - Arrange for interpretive services to assist at discharge as needed  - Refer to Case Management Department for coordinating discharge planning if the patient needs post-hospital services based on physician/advanced practitioner order or complex needs related to functional status, cognitive ability, or social support system  Outcome: Progressing     Problem: Knowledge Deficit  Goal: Patient/family/caregiver demonstrates understanding of disease process, treatment plan, medications, and discharge instructions  Description: Complete learning assessment and assess knowledge base.  Interventions:  - Provide teaching at level of understanding  - Provide teaching via preferred learning methods  Outcome: Progressing     Problem: GASTROINTESTINAL - ADULT  Goal: Minimal or absence of nausea and/or vomiting  Description: INTERVENTIONS:  - Administer IV fluids if ordered to ensure adequate hydration  - Maintain NPO status until nausea and vomiting are resolved  - Nasogastric tube if ordered  - Administer ordered antiemetic medications as needed  - Provide nonpharmacologic comfort measures as appropriate  - Advance diet as tolerated, if ordered  - Consider nutrition services referral to assist patient with adequate nutrition and appropriate food choices  Outcome: Progressing  Goal: Maintains or returns to baseline bowel function  Description: INTERVENTIONS:  - Assess bowel function  - Encourage oral fluids to ensure adequate hydration  - Administer IV fluids if ordered to ensure  adequate hydration  - Administer ordered medications as needed  - Encourage mobilization and activity  - Consider nutritional services referral to assist patient with adequate nutrition and appropriate food choices  Outcome: Progressing  Goal: Maintains adequate nutritional intake  Description: INTERVENTIONS:  - Monitor percentage of each meal consumed  - Identify factors contributing to decreased intake, treat as appropriate  - Assist with meals as needed  - Monitor I&O, weight, and lab values if indicated  - Obtain nutrition services referral as needed  Outcome: Progressing     Problem: GENITOURINARY - ADULT  Goal: Maintains or returns to baseline urinary function  Description: INTERVENTIONS:  - Assess urinary function  - Encourage oral fluids to ensure adequate hydration if ordered  - Administer IV fluids as ordered to ensure adequate hydration  - Administer ordered medications as needed  - Offer frequent toileting  - Follow urinary retention protocol if ordered  Outcome: Progressing  Goal: Absence of urinary retention  Description: INTERVENTIONS:  - Assess patient’s ability to void and empty bladder  - Monitor I/O  - Bladder scan as needed  - Discuss with physician/AP medications to alleviate retention as needed  - Discuss catheterization for long term situations as appropriate  Outcome: Progressing  Goal: Urinary catheter remains patent  Description: INTERVENTIONS:  - Assess patency of urinary catheter  - If patient has a chronic dela cruz, consider changing catheter if non-functioning  - Follow guidelines for intermittent irrigation of non-functioning urinary catheter  Outcome: Progressing     Problem: METABOLIC, FLUID AND ELECTROLYTES - ADULT  Goal: Electrolytes maintained within normal limits  Description: INTERVENTIONS:  - Monitor labs and assess patient for signs and symptoms of electrolyte imbalances  - Administer electrolyte replacement as ordered  - Monitor response to electrolyte replacements, including  repeat lab results as appropriate  - Instruct patient on fluid and nutrition as appropriate  Outcome: Progressing  Goal: Fluid balance maintained  Description: INTERVENTIONS:  - Monitor labs   - Monitor I/O and WT  - Instruct patient on fluid and nutrition as appropriate  - Assess for signs & symptoms of volume excess or deficit  Outcome: Progressing  Goal: Glucose maintained within target range  Description: INTERVENTIONS:  - Monitor Blood Glucose as ordered  - Assess for signs and symptoms of hyperglycemia and hypoglycemia  - Administer ordered medications to maintain glucose within target range  - Assess nutritional intake and initiate nutrition service referral as needed  Outcome: Progressing

## 2025-02-11 NOTE — ASSESSMENT & PLAN NOTE
Meeting criteria with tachycardia, tachypnea, leukocytosis, elevated lactic acid  UTI as suspected source   Hx MDRO  Empiric ertapenem  Urine and blood cultures pending  Fluid resuscitation 30 mL/kg crystalloid for elevated lactic acid

## 2025-02-11 NOTE — UTILIZATION REVIEW
Initial Clinical Review    Admission: Date/Time/Statement:   Admission Orders (From admission, onward)       Ordered        02/10/25 2337  INPATIENT ADMISSION  Once                          Orders Placed This Encounter   Procedures    INPATIENT ADMISSION     Standing Status:   Standing     Number of Occurrences:   1     Level of Care:   Med Surg [16]     Estimated length of stay:   More than 2 Midnights     Certification:   I certify that inpatient services are medically necessary for this patient for a duration of greater than two midnights. See H&P and MD Progress Notes for additional information about the patient's course of treatment.     ED Arrival Information       Expected   -    Arrival   2/10/2025 18:34    Acuity   Urgent              Means of arrival   Stretcher    Escorted by   [facility] ([facility])    Service   Hospitalist    Admission type   Emergency              Arrival complaint   dizziness             Chief Complaint   Patient presents with    Abdominal Pain     Arrives via ems from home with c/o generalized abd pain ongoing since yesterday. States he did have a couple episodes of vomiting, but denies nausea now. Hx of MS. Denies sick contacts.         Initial Presentation: 74 y.o. male presents to ED  via  EMS  from home  with severe lower abdominal  pain for 1 day. Met sepsis criteria  in ED with  tachypnea, leukocytosis, tachycardia  and elevated lactic acid.  Suspect  source  UTI.  PMH  is  MS,  neurogenic bladder, chronic  SPT,  CVA,  NIDDM and HTN.  SPT  changed in ED, UA  with + nitrite/pyuria and bacteriuria.   Ct abdomen shows no acute process.    Admit  Ip with  Severe Sepsis,  UTI and plan is  SHARON, monitor labs. Blood/urine cultures, IVF,   Serial abdominal exams and continue home meds.        Anticipated Length of Stay/Certification Statement:  Patient will be admitted on an inpatient basis with an anticipated length of stay of greater than 2 midnights secondary to sepsis.      Date:   2/11   Day 2:   Chronically ill appearing.   Continue  SHARON and F/U cultures.  LE  atrophied with  trace edema.  Continue current meds.      Date:  2/12  Day 3: Has surpassed a 2nd midnight with active treatments and services.  Remains on  SHARON.   Feels  much improved.    Has chronic deficits  R/T  MS, atrophy lower extremities.       ED Treatment-Medication Administration from 02/10/2025 1834 to 02/11/2025 0944         Date/Time Order Dose Route Action     02/10/2025 2004 iohexol (OMNIPAQUE) 350 MG/ML injection (MULTI-DOSE) 100 mL 100 mL Intravenous Given     02/10/2025 2234 piperacillin-tazobactam (ZOSYN) IVPB 4.5 g 4.5 g Intravenous New Bag     02/10/2025 2248 multi-electrolyte (ISOLYTE-S PH 7.4) bolus 1,000 mL 1,000 mL Intravenous New Bag     02/11/2025 0932 enoxaparin (LOVENOX) subcutaneous injection 40 mg 40 mg Subcutaneous Given     02/11/2025 0125 HYDROmorphone HCl (DILAUDID) injection 0.2 mg 0.2 mg Intravenous Given     02/11/2025 0412 ertapenem (INVanz) 1,000 mg in sodium chloride 0.9 % 50 mL IVPB 1,000 mg Intravenous New Bag     02/11/2025 0124 sodium chloride 0.9 % bolus 1,000 mL 1,000 mL Intravenous New Bag     02/11/2025 0206 mirtazapine (REMERON) tablet 7.5 mg 7.5 mg Oral Given     02/11/2025 0519 pantoprazole (PROTONIX) EC tablet 40 mg 40 mg Oral Given     02/11/2025 0928 polyethylene glycol (MIRALAX) packet 17 g 17 g Oral Given     02/11/2025 0929 amLODIPine (NORVASC) tablet 10 mg 10 mg Oral Given     02/11/2025 0206 baclofen tablet 5 mg 5 mg Oral Given     02/11/2025 0929 baclofen tablet 5 mg 5 mg Oral Given     02/11/2025 0929 clopidogrel (PLAVIX) tablet 75 mg 75 mg Oral Given     02/11/2025 0929 cyanocobalamin (VITAMIN B-12) tablet 500 mcg 500 mcg Oral Given     02/11/2025 0928 gabapentin (NEURONTIN) capsule 300 mg 300 mg Oral Given     02/11/2025 0207 gabapentin (NEURONTIN) capsule 600 mg 600 mg Oral Given     02/11/2025 0702 multi-electrolyte (PLASMALYTE-A/ISOLYTE-S PH 7.4) IV  solution 75 mL/hr Intravenous New Bag     02/11/2025 0707 magnesium sulfate 2 g/50 mL IVPB (premix) 2 g 2 g Intravenous New Bag     02/11/2025 0819 sodium chloride 0.9 % infusion 100 mL/hr Intravenous New Bag            Scheduled Medications:  amLODIPine, 10 mg, Oral, Daily  atorvastatin, 80 mg, Oral, Daily With Dinner  baclofen, 5 mg, Oral, TID  budesonide-formoterol, 2 puff, Inhalation, BID  clopidogrel, 75 mg, Oral, Daily  cyanocobalamin, 500 mcg, Oral, Daily  enoxaparin, 40 mg, Subcutaneous, Daily  ertapenem, 1,000 mg, Intravenous, Q24H  gabapentin, 300 mg, Oral, TID  gabapentin, 600 mg, Oral, HS  insulin lispro, 1-5 Units, Subcutaneous, TID AC  insulin lispro, 1-5 Units, Subcutaneous, HS  latanoprost, 1 drop, Both Eyes, HS  mirtazapine, 7.5 mg, Oral, HS  pantoprazole, 40 mg, Oral, Early Morning  polyethylene glycol, 17 g, Oral, Daily  [START ON 2/12/2025] propranolol, 60 mg, Oral, Daily  senna, 2 tablet, Oral, HS      Continuous IV Infusions:  sodium chloride, 100 mL/hr, Intravenous, Continuous      PRN Meds:  acetaminophen, 650 mg, Oral, Q6H PRN  melatonin, 3 mg, Oral, HS PRN  oxyCODONE, 5 mg, Oral, Q6H PRN      ED Triage Vitals [02/10/25 1838]   Temperature Pulse Respirations Blood Pressure SpO2 Pain Score   97.8 °F (36.6 °C) (!) 110 17 141/75 96 % 7     Weight (last 2 days)       Date/Time Weight    02/10/25 2320 64.1 (141.32)            Vital Signs (last 3 days)       Date/Time Temp Pulse Resp BP MAP (mmHg) SpO2 O2 Device Patient Position - Orthostatic VS [place] Coma Scale Score Pain    02/11/25 0930 -- 71 16 127/57 83 96 % None (Room air) Lying -- --    02/11/25 0929 -- -- -- 127/57 -- -- -- -- -- --    02/11/25 0915 -- 69 16 124/60 86 96 % None (Room air) Lying -- --    02/11/25 0900 -- 66 16 146/63 90 96 % None (Room air) Lying -- --    02/11/25 0845 -- 66 16 123/58 83 96 % None (Room air) Lying -- --    02/11/25 0830 -- 66 16 126/59 85 96 % None (Room air) Lying -- --    02/11/25 0815 -- 66 16 130/61  88 96 % None (Room air) Lying -- --    02/11/25 0800 -- 66 16 133/62 89 96 % None (Room air) Lying -- --    02/11/25 0745 -- 66 16 127/60 87 95 % None (Room air) Lying -- --    02/11/25 0715 -- 70 16 114/55 79 95 % None (Room air) Lying 15 --    02/11/25 0700 -- 69 16 120/58 84 95 % None (Room air) Lying -- --    02/11/25 0600 -- 73 18 116/70 91 95 % None (Room air) -- -- --    02/11/25 0545 -- 75 19 147/96 114 95 % None (Room air) -- -- --    02/11/25 0515 97.9 °F (36.6 °C) 78 20 144/67 96 96 % -- -- -- --    02/11/25 0500 -- 78 15 143/66 95 97 % -- -- -- --    02/11/25 0400 -- -- -- -- -- -- -- -- 15 --    02/11/25 0300 -- 82 13 164/74 106 98 % None (Room air) -- -- --    02/11/25 0230 -- 82 14 146/66 95 95 % None (Room air) Lying 15 --    02/11/25 0200 -- 83 13 113/55 79 94 % None (Room air) -- -- --    02/11/25 0145 -- 87 15 114/57 82 94 % -- -- -- --    02/11/25 0130 -- 92 20 138/67 96 96 % None (Room air) Lying -- --    02/11/25 0125 -- 91 20 151/67 -- 96 % None (Room air) Lying -- 5    02/11/25 0115 -- 91 20 141/66 95 95 % -- -- -- --    02/11/25 0100 -- 93 20 138/63 90 97 % -- -- -- --    02/11/25 0045 -- 91 20 141/63 91 97 % None (Room air) -- -- --    02/11/25 0030 -- 95 14 150/67 96 97 % None (Room air) -- -- 4    02/10/25 2330 -- 97 20 158/73 105 95 % None (Room air) -- -- --    02/10/25 2315 -- 101 18 119/57 82 95 % None (Room air) -- -- --    02/10/25 2300 -- 102 19 128/58 84 94 % None (Room air) Lying -- --    02/10/25 2245 -- 101 22 127/59 85 94 % None (Room air) -- -- --    02/10/25 2230 -- 104 22 142/71 99 94 % None (Room air) Lying -- --    02/10/25 2200 -- 107 20 145/70 100 94 % None (Room air) Lying -- --    02/10/25 2145 -- 106 19 138/65 94 94 % None (Room air) Lying -- --    02/10/25 2130 -- 107 20 134/60 87 94 % None (Room air) Lying -- --    02/10/25 2115 -- 105 19 155/73 105 94 % None (Room air) -- -- --    02/10/25 2100 -- 105 21 143/67 96 92 % -- -- -- --    02/10/25 2045 -- 107 18 150/68  98 94 % None (Room air) -- -- --    02/10/25 2030 -- 107 18 157/70 101 93 % None (Room air) -- -- --    02/10/25 2015 -- 105 17 169/74 107 94 % None (Room air) Lying -- --    02/10/25 1844 -- -- -- -- -- -- None (Room air) -- -- --    02/10/25 1838 97.8 °F (36.6 °C) 110 17 141/75 -- 96 % None (Room air) Lying -- 7              Pertinent Labs/Diagnostic Test Results:   Radiology:  CT abdomen pelvis with contrast   Final Interpretation by Li Wiggins MD (02/10 2024)      Suprapubic catheter present. Chronic bladder wall thickening and perivesical stranding, mildly improved compared to January.      Mild thickening about the distal rectum, also. Chronic.      No new findings.         Workstation performed: [id number]         XR chest 2 views   Final Interpretation by Manuel Mccabe MD (02/11 0922)      No acute cardiopulmonary disease.            Resident: Liban Lauern I, the attending radiologist, have reviewed the images and agree with the final report above.      Workstation performed: [id number]           Cardiology:  ECG 12 lead   Final Result by Elver Christine DO (02/11 0821)   Normal sinus rhythm   Normal ECG   When compared with ECG of 10-Feb-2025 21:27, (unconfirmed)   No significant change was found   Confirmed by Elver Christine ([id number]) on 2/11/2025 8:21:48 AM      ECG 12 lead   Final Result by Elver Christine DO (02/11 0821)   Sinus tachycardia   RSR' or QR pattern in V1 suggests right ventricular conduction delay   Abnormal ECG   When compared with ECG of 10-Feb-2025 19:38, (unconfirmed)   No significant change was found   Confirmed by Elver Christine ([id number]) on 2/11/2025 8:21:24 AM      ECG 12 lead   Final Result by Elver Christine DO (02/11 0820)   Sinus tachycardia   RSR' or QR pattern in V1 suggests right ventricular conduction delay   Possible Lateral infarct , age undetermined   Abnormal ECG   When compared with ECG of 20-Jan-2025 16:17,   No significant  change was found   Confirmed by Elver Christine ([id number]) on 2/11/2025 8:20:19 AM        GI:      Results from last 7 days   Lab Units 02/11/25  0404 02/10/25  1846   WBC Thousand/uL 12.85* 13.59*   HEMOGLOBIN g/dL 13.1 15.4   HEMATOCRIT % 41.1 49.2   PLATELETS Thousands/uL 284 329   TOTAL NEUT ABS Thousands/µL 8.19* 8.77*         Results from last 7 days   Lab Units 02/11/25  0516 02/10/25  1846   SODIUM mmol/L 137 139   POTASSIUM mmol/L 5.0 4.2   CHLORIDE mmol/L 108 106   CO2 mmol/L 20* 23   ANION GAP mmol/L 9 10   BUN mg/dL 13 17   CREATININE mg/dL 0.68 0.84   EGFR ml/min/1.73sq m 93 85   CALCIUM mg/dL 8.7 10.2   MAGNESIUM mg/dL 1.5*  --      Results from last 7 days   Lab Units 02/10/25  1846   AST U/L 11*   ALT U/L 6*   ALK PHOS U/L 75   TOTAL PROTEIN g/dL 7.8   ALBUMIN g/dL 4.0   TOTAL BILIRUBIN mg/dL 0.34     Results from last 7 days   Lab Units 02/11/25  0814   POC GLUCOSE mg/dl 75     Results from last 7 days   Lab Units 02/11/25  0516 02/10/25  1846   GLUCOSE RANDOM mg/dL 95 87         Results from last 7 days   Lab Units 02/10/25  1846   HEMOGLOBIN A1C % 6.3*   EAG mg/dl 134           Results from last 7 days   Lab Units 02/10/25  2348 02/10/25  2214 02/10/25  1932   HS TNI 0HR ng/L  --   --  4   HS TNI 2HR ng/L  --  4  --    HSTNI D2 ng/L  --  0  --    HS TNI 4HR ng/L 4  --   --    HSTNI D4 ng/L 0  --   --          Results from last 7 days   Lab Units 02/10/25  1932   PROTIME seconds 12.8   INR  0.94   PTT seconds 27         Results from last 7 days   Lab Units 02/10/25  1954   PROCALCITONIN ng/ml <0.05     Results from last 7 days   Lab Units 02/11/25  0404 02/11/25  0027 02/10/25  2214   LACTIC ACID mmol/L 2.1* 2.8* 2.2*               Results from last 7 days   Lab Units 02/10/25  1846   LIPASE u/L 34                 Results from last 7 days   Lab Units 02/10/25  2131   CLARITY UA  Extra Turbid   COLOR UA  Light Orange   SPEC GRAV UA  1.023   PH UA  6.0   GLUCOSE UA mg/dl 500 (1/2%)*   KETONES UA mg/dl  Trace*   BLOOD UA  Moderate*   PROTEIN UA mg/dl 100 (2+)*   NITRITE UA  Positive*   BILIRUBIN UA  Negative   UROBILINOGEN UA (BE) mg/dl <2.0   LEUKOCYTES UA  Large*   WBC UA /hpf Innumerable*   RBC UA /hpf Innumerable*   BACTERIA UA /hpf Innumerable*   EPITHELIAL CELLS WET PREP /hpf None Seen               Results from last 7 days   Lab Units 02/10/25  1955 02/10/25  1954   BLOOD CULTURE  Received in Microbiology Lab. Culture in Progress. Received in Microbiology Lab. Culture in Progress.                   Present on Admission:   UTI (urinary tract infection)   Severe sepsis (HCC)   Type 2 diabetes mellitus without complication, without long-term current use of insulin (HCC)   Urinary retention   Multiple sclerosis (HCC)      Admitting Diagnosis: Abdominal pain [R10.9]  Age/Sex: 74 y.o. male    Network Utilization Review Department  ATTENTION: Please call with any questions or concerns to [phone] and carefully listen to the prompts so that you are directed to the right person. All voicemails are confidential.   For Discharge needs, contact Care Management DC Support Team at [phone] opt. 2  Send all requests for admission clinical reviews, approved or denied determinations and any other requests to dedicated fax number below belonging to the campus where the patient is receiving treatment. List of dedicated fax numbers for the Facilities:  FACILITY NAME UR FAX NUMBER   ADMISSION DENIALS (Administrative/Medical Necessity) [phone]   DISCHARGE SUPPORT TEAM (NETWORK) [phone]   PARENT CHILD HEALTH (Maternity/NICU/Pediatrics) [phone]   [facility] [phone]   [facility] [phone]   [facility] [phone]   [facility] [phone]   [facility] [phone]   [facility] [phone]   [facility] [phone]    [facility] [phone]   [facility] [phone]   [facility] [phone]   [facility] [phone]   [facility] [phone]

## 2025-02-11 NOTE — ASSESSMENT & PLAN NOTE
Hx MDRO Enterobacter cloacea   Empiric ertapenem  Urine culture pending  Chronic suprapubic catheter, exchanged previously 1/23 in the urology office and in the ED on 2/10

## 2025-02-11 NOTE — SEPSIS NOTE
"  Sepsis Note   Mathew Rico 74 y.o. male MRN: [id number]  Unit/Bed#: ED-07 Encounter: [id number]       Initial Sepsis Screening       Row Name 02/11/25 0115 02/10/25 2302 02/10/25 1918          Is the patient's history suggestive of a new or worsening infection? Yes (Proceed)  - -- Yes (Proceed)  -MM      Suspected source of infection urinary tract infection  - -- urinary tract infection  -MM      Indicate SIRS criteria Tachycardia > 90 bpm;Tachypnea > 20 resp per min;Leukocytosis (WBC > 15009 IJL) OR Leukopenia (WBC <4000 IJL) OR Bandemia (WBC >10% bands)  - -- Tachycardia > 90 bpm;Leukocytosis (WBC > 29355 IJL) OR Leukopenia (WBC <4000 IJL) OR Bandemia (WBC >10% bands)  -MM      Are two or more of the above signs & symptoms of infection both present and new to the patient? Yes (Proceed)  - -- Yes (Proceed)  -MM      Assess for evidence of organ dysfunction: Are any of the below criteria present within 6 hours of suspected infection and SIRS criteria that are NOT considered to be chronic conditions? -- Lactate > 2.0  -MM --      Date of presentation of severe sepsis -- 02/10/25  -MM --      Time of presentation of severe sepsis -- 2234  -MM --      Sepsis Note: Click \"NEXT\" below (NOT \"close\") to generate sepsis note based on above information. -- YES (proceed by clicking \"NEXT\")  -MM --                User Key  (r) = Recorded By, (t) = Taken By, (c) = Cosigned By      Initials Name Provider Type     SHA Heck Nurse Practitioner    MM Tania Reyes DO Resident                        Body mass index is 24.26 kg/m².  Wt Readings from Last 1 Encounters:   02/10/25 64.1 kg (141 lb 5 oz)     IBW (Ideal Body Weight): 59.2 kg    Ideal body weight: 59.2 kg (130 lb 8.2 oz)  Adjusted ideal body weight: 61.2 kg (134 lb 13.3 oz)    "

## 2025-02-11 NOTE — ED NOTES
Per LISSETTE, to hold off on further IV fluids at this time     Renee Camargo, RN  02/11/25 0425

## 2025-02-11 NOTE — SEPSIS NOTE
"  Sepsis Note   Mathew Rico 74 y.o. male MRN: [id number]  Unit/Bed#: ED-07 Encounter: [id number]       Initial Sepsis Screening       Row Name 02/10/25 2302 02/10/25 1918             Is the patient's history suggestive of a new or worsening infection? -- Yes (Proceed)  -MM       Suspected source of infection -- urinary tract infection  -MM       Indicate SIRS criteria -- Tachycardia > 90 bpm;Leukocytosis (WBC > 95085 IJL) OR Leukopenia (WBC <4000 IJL) OR Bandemia (WBC >10% bands)  -MM       Are two or more of the above signs & symptoms of infection both present and new to the patient? -- Yes (Proceed)  -MM       Assess for evidence of organ dysfunction: Are any of the below criteria present within 6 hours of suspected infection and SIRS criteria that are NOT considered to be chronic conditions? Lactate > 2.0  -MM --       Date of presentation of severe sepsis 02/10/25  -MM --       Time of presentation of severe sepsis 2234  -MM --       Sepsis Note: Click \"NEXT\" below (NOT \"close\") to generate sepsis note based on above information. YES (proceed by clicking \"NEXT\")  -MM --                 User Key  (r) = Recorded By, (t) = Taken By, (c) = Cosigned By      Initials Name Provider Type    DO Joesph Monroe                        There is no height or weight on file to calculate BMI.  Wt Readings from Last 1 Encounters:   01/20/25 64.1 kg (141 lb 5 oz)        Ideal body weight: 59.2 kg (130 lb 8.2 oz)  Adjusted ideal body weight: 61.2 kg (134 lb 13.3 oz)    "

## 2025-02-11 NOTE — H&P
"H&P - Hospitalist   Name: Mathew Rico 74 y.o. male I MRN: [id number]  Unit/Bed#: ED-07 I Date of Admission: 2/10/2025   Date of Service: 2/11/2025 I Hospital Day: 1     Assessment & Plan  Severe sepsis (HCC)  Meeting criteria with tachycardia, tachypnea, leukocytosis, elevated lactic acid  UTI as suspected source   Hx MDRO  Empiric ertapenem  Urine and blood cultures pending  Fluid resuscitation 30 mL/kg crystalloid for elevated lactic acid  History of CVA (cerebrovascular accident)  Continue PTA Plavix and statin  UTI (urinary tract infection)  Hx MDRO Enterobacter cloacea   Empiric ertapenem  Urine culture pending  Chronic suprapubic catheter, exchanged previously 1/23 in the urology office and in the ED on 2/10  Type 2 diabetes mellitus without complication, without long-term current use of insulin ([facility])  Lab Results   Component Value Date    HGBA1C 9.0 (H) 10/06/2024       No results for input(s): \"POCGLU\" in the last 72 hours.    Blood Sugar Average: Last 72 hrs:  Oral medications on hold  SSI with Accu-Cheks  Hypoglycemia protocol  Carbohydrate controlled diet  Multiple sclerosis (HCC)  Hx MS, neurogenic bladder  Continue PTA baclofen and gabapentin  Urinary retention  Neurogenic bladder  SPT in place  Outpatient urology follow-up  Abdominal pain  Presented with severe abdominal pain x 1 day  Tender to palpation only over suprapubic area  SPT changed by ED without improvement in pain  Will treat for UTI  Continue serial abdominal exams  Not tender to right or left lower quadrants or upper abdominal area      VTE Pharmacologic Prophylaxis:   High Risk (Score >/= 5) - Pharmacological DVT Prophylaxis Ordered: enoxaparin (Lovenox). Sequential Compression Devices Ordered.  Code Status: Level 1 - Full Code     Anticipated Length of Stay: Patient will be admitted on an inpatient basis with an anticipated length of stay of greater than 2 midnights secondary to sepsis.    History of Present Illness   Chief " Complaint: Abdominal pain    Mathew Rico is a 74 y.o. male with a PMH of multiple sclerosis, neurogenic bladder, chronic SPT, NIDDM, HTN, HLD who presents with severe lower abdominal pain x 1 day.  Found to be meeting sepsis criteria, SPT was changed by ED and UA revealing nitrite positive pyuria/bacteriuria along with CT abdomen pelvis negative for acute process other than chronic diffuse bladder wall thickening.  On exam patient very tender over suprapubic aspect, no hernias noted, no tenderness to the right or left lower abdominal quadrants or upper abdomen.    Review of Systems   Constitutional:  Positive for chills and fatigue. Negative for fever.   HENT:  Negative for ear pain and sore throat.    Eyes:  Negative for pain and visual disturbance.   Respiratory:  Negative for cough and shortness of breath.    Cardiovascular:  Negative for chest pain and palpitations.   Gastrointestinal:  Positive for abdominal pain. Negative for vomiting.   Genitourinary:  Negative for dysuria and hematuria.        Chronic SPT   Musculoskeletal:  Positive for gait problem and myalgias. Negative for arthralgias and back pain.   Skin:  Negative for color change and rash.   Neurological:  Positive for weakness. Negative for seizures and syncope.   All other systems reviewed and are negative.      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HCC)     Bladder infection     Bronchitis     Constipation     Cough     Diabetes (HCC)     Diabetes mellitus (HCC)     Dizziness     Dysfunction of eustachian tube     Erectile dysfunction of non-organic origin     Fatigue     Glaucoma     Hiatal hernia     Hypertension     Imbalance     Leg muscle spasm     Leukocytosis 04/01/2024    MS (multiple sclerosis) (HCC)     Multiple sclerosis (HCC)     Nephrolithiasis     Neurogenic  bladder     No natural teeth     Sinus pain     Spinal stenosis     Strain of thoracic region     Stroke (HCC)     Suprapubic catheter (HCC)      Past Surgical History:   Procedure Laterality Date    APPENDECTOMY      BRAIN SURGERY      Coil placed in aneurysm    CEREBRAL ANEURYSM REPAIR      CYSTOSCOPY      CYSTOSCOPY      CYSTOSCOPY  2018    CYSTOSCOPY  01/15/2021    EYE SURGERY      transscleral cyclophotocoagulation noncontact YAG laser    IR SUPRAPUBIC CATHETER CHECK/CHANGE/REINSERTION/UPSIZE  3/28/2024    MYRINGOTOMY      with ventilation tube insertion    IA LITHOLAPAXY SMPL/SM <2.5 CM N/A 2019    Procedure: CYSTOSCOPY, holmium laser litholapaxy of bladder stones, EXCHANGE OF SP TUBE;  Surgeon: Javy Jeffries MD;  Location: [facility];  Service: Urology    SUPRAPUBIC CATHETER INSERTION       Social History     Tobacco Use    Smoking status: Former     Current packs/day: 0.00     Average packs/day: 0.5 packs/day for 31.0 years (15.5 ttl pk-yrs)     Types: Cigarettes     Start date:      Quit date:      Years since quittin.1    Smokeless tobacco: Never   Vaping Use    Vaping status: Never Used   Substance and Sexual Activity    Alcohol use: Not Currently    Drug use: No    Sexual activity: Not Currently     E-Cigarette/Vaping    E-Cigarette Use Never User      E-Cigarette/Vaping Substances    Nicotine No     THC No     CBD No     Flavoring No     Other No     Unknown No      Family History   Problem Relation Age of Onset    Heart attack Mother     Stroke Mother     Heart attack Father     Anuerysm Father         In Stomach     Aneurysm Father      Social History:  Marital Status: Single   Occupation: Retired  Patient Pre-hospital Living Situation: Home  Patient Pre-hospital Level of Mobility: unable to be assessed at time of evaluation  Patient Pre-hospital Diet Restrictions: None    Meds/Allergies   I have reviewed home medications using recent Epic encounter.  Prior to Admission  medications    Medication Sig Start Date End Date Taking? Authorizing Provider   amLODIPine-atorvastatin (CADUET) 10-80 MG per tablet Take 1 tablet by mouth daily   Yes Historical Provider, MD   baclofen 10 mg tablet Take 5 mg by mouth 3 (three) times a day   Yes Historical Provider, MD   bisacodyl (DULCOLAX) 10 mg suppository Insert 1 suppository (10 mg total) into the rectum daily as needed for constipation for up to 12 doses 10/10/24  Yes Mary Kemp MD   budesonide-formoterol (SYMBICORT) 160-4.5 mcg/act inhaler Inhale 2 puffs 2 (two) times a day Rinse mouth after use.   Yes Historical Provider, MD   cromolyn (NASALCHROM) 5.2 MG/ACT nasal spray 1 spray into each nostril 3 (three) times a day 6/18/24  Yes Steve Shook DO   cyanocobalamin (VITAMIN B-12) 500 MCG tablet Take 1 tablet (500 mcg total) by mouth daily 4/2/24  Yes SHA Sutton   Ergocalciferol (VITAMIN D2 PO) Take 50,000 Units by mouth once a week   Yes Historical Provider, MD   latanoprost (XALATAN) 0.005 % ophthalmic solution Administer 1 drop to both eyes daily at bedtime   Yes Historical Provider, MD   melatonin 3 mg Take 3 mg by mouth daily at bedtime as needed   Yes Historical Provider, MD   mirtazapine (REMERON) 7.5 MG tablet Take 7.5 mg by mouth daily at bedtime   Yes Historical Provider, MD   pantoprazole (PROTONIX) 40 mg tablet Take 40 mg by mouth daily   Yes Historical Provider, MD   propranolol (INDERAL LA) 60 mg 24 hr capsule Take 60 mg by mouth daily   Yes Historical Provider, MD   senna-docusate sodium (SENOKOT S) 8.6-50 mg per tablet Take 2 tablets by mouth daily at bedtime 10/10/24  Yes Mary Kemp MD   acetaminophen (TYLENOL) 325 mg tablet Take 2 tablets (650 mg total) by mouth every 6 (six) hours as needed for mild pain 3/29/24   Cristin Booth MD   albuterol (2.5 mg/3 mL) 0.083 % nebulizer solution Take 3 mL (2.5 mg total) by nebulization every 6 (six) hours as needed for wheezing or shortness of breath  10/3/23   Nelson Cabezas MD   atorvastatin (LIPITOR) 80 mg tablet Take 80 mg by mouth at bedtime    Historical Provider, MD   clopidogrel (PLAVIX) 75 mg tablet Take 1 tablet (75 mg total) by mouth daily 10/27/18   Kraig Griffin MD   Empagliflozin (JARDIANCE) 10 MG TABS tablet Take 10 mg by mouth every morning    Historical Provider, MD   furosemide (LASIX) 40 mg tablet Take 40 mg by mouth daily    Historical Provider, MD   gabapentin (NEURONTIN) 300 mg capsule Take 1 capsule (300 mg total) by mouth 3 (three) times a day 12/4/24   Brendon Pepper DO   gabapentin (NEURONTIN) 300 mg capsule Take 2 capsules (600 mg total) by mouth daily at bedtime 12/4/24   Brendon Pepper DO   losartan (COZAAR) 50 mg tablet Take 50 mg by mouth daily    Historical Provider, MD   meclizine (ANTIVERT) 12.5 MG tablet Take 1 tablet (12.5 mg total) by mouth every 8 (eight) hours as needed for dizziness 7/28/24   Miah Alexander MD   metFORMIN (GLUCOPHAGE) 1000 MG tablet Take 1,000 mg by mouth daily with breakfast    Historical Provider, MD   methenamine hippurate (HIPREX) 1 g tablet Take 1 tablet (1 g total) by mouth 2 (two) times a day with meals 3/27/24   Brianna Ramirez PA-C   polyethylene glycol (MIRALAX) 17 g packet Take 17 g by mouth 2 (two) times a day 6/18/24   Steve Shook DO   potassium chloride (Klor-Con M10) 10 mEq tablet Take 10 mEq by mouth 2 (two) times a day    Historical Provider, MD   sitaGLIPtin (JANUVIA) 100 mg tablet Take 100 mg by mouth daily    Historical Provider, MD     Allergies   Allergen Reactions    Cephalexin Rash    Cephalexin Rash       Objective :  Temp:  [97.8 °F (36.6 °C)] 97.8 °F (36.6 °C)  HR:  [] 82  BP: (113-169)/(55-75) 146/66  Resp:  [13-22] 14  SpO2:  [92 %-97 %] 95 %  O2 Device: None (Room air)    Physical Exam  Vitals and nursing note reviewed.   Constitutional:       General: He is not in acute distress.     Appearance: He is well-developed. He is ill-appearing.    HENT:      Head: Normocephalic and atraumatic.      Mouth/Throat:      Mouth: Mucous membranes are dry.   Eyes:      Conjunctiva/sclera: Conjunctivae normal.   Cardiovascular:      Rate and Rhythm: Normal rate and regular rhythm.      Heart sounds: No murmur heard.  Pulmonary:      Effort: Pulmonary effort is normal. No respiratory distress.      Breath sounds: Normal breath sounds.   Abdominal:      Palpations: Abdomen is soft.      Tenderness: There is abdominal tenderness in the suprapubic area.      Comments: SPT   Musculoskeletal:         General: No swelling.      Cervical back: Neck supple.      Comments: Extremity spasticity   Skin:     General: Skin is warm.      Capillary Refill: Capillary refill takes less than 2 seconds.   Neurological:      Mental Status: He is alert. Mental status is at baseline.      Comments: Flat affect   Psychiatric:         Mood and Affect: Mood normal.         Behavior: Behavior normal.        Lines/Drains:  Lines/Drains/Airways       Active Status       Name Placement date Placement time Site Days    Suprapubic Catheter 20 Fr. 02/10/25  2125  --  less than 1                          Lab Results: I have reviewed the following results:  Results from last 7 days   Lab Units 02/10/25  1846   WBC Thousand/uL 13.59*   HEMOGLOBIN g/dL 15.4   HEMATOCRIT % 49.2   PLATELETS Thousands/uL 329   SEGS PCT % 65   LYMPHO PCT % 22   MONO PCT % 8   EOS PCT % 4     Results from last 7 days   Lab Units 02/10/25  1846   SODIUM mmol/L 139   POTASSIUM mmol/L 4.2   CHLORIDE mmol/L 106   CO2 mmol/L 23   BUN mg/dL 17   CREATININE mg/dL 0.84   ANION GAP mmol/L 10   CALCIUM mg/dL 10.2   ALBUMIN g/dL 4.0   TOTAL BILIRUBIN mg/dL 0.34   ALK PHOS U/L 75   ALT U/L 6*   AST U/L 11*   GLUCOSE RANDOM mg/dL 87     Results from last 7 days   Lab Units 02/10/25  1932   INR  0.94         Lab Results   Component Value Date    HGBA1C 9.0 (H) 10/06/2024    HGBA1C 8.0 (H) 06/16/2024    HGBA1C 6.4 (H) 03/26/2024      Results from last 7 days   Lab Units 02/11/25  0027 02/10/25  2214 02/10/25  1954   LACTIC ACID mmol/L 2.8* 2.2*  --    PROCALCITONIN ng/ml  --   --  <0.05       Imaging Results Review: I reviewed radiology reports from this admission including: CT abdomen/pelvis.  Other Study Results Review: No additional pertinent studies reviewed.    ** Please Note: This note has been constructed using a voice recognition system. **

## 2025-02-11 NOTE — ASSESSMENT & PLAN NOTE
Presented with severe abdominal pain x 1 day  Tender to palpation only over suprapubic area  SPT changed by ED without improvement in pain  Will treat for UTI  Continue serial abdominal exams  Not tender to right or left lower quadrants or upper abdominal area

## 2025-02-11 NOTE — ED NOTES
This RN attempted to update patient's brother, Guzman with patient's admission to hospital. The provided number in the chart went to voicemail.     Renee Camargo RN  02/10/25 1040

## 2025-02-11 NOTE — PLAN OF CARE
Problem: Potential for Falls  Goal: Patient will remain free of falls  Description: INTERVENTIONS:  - Educate patient/family on patient safety including physical limitations  - Instruct patient to call for assistance with activity   - Consult OT/PT to assist with strengthening/mobility   - Keep Call bell within reach  - Keep bed low and locked with side rails adjusted as appropriate  - Keep care items and personal belongings within reach  - Initiate and maintain comfort rounds  - Make Fall Risk Sign visible to staff  - Offer Toileting every  Hours, in advance of need  - Initiate/Maintain alarm  - Obtain necessary fall risk management equipmen  - Apply yellow socks and bracelet for high fall risk patients  - Consider moving patient to room near nurses station  Outcome: Progressing     Problem: PAIN - ADULT  Goal: Verbalizes/displays adequate comfort level or baseline comfort level  Description: Interventions:  - Encourage patient to monitor pain and request assistance  - Assess pain using appropriate pain scale  - Administer analgesics based on type and severity of pain and evaluate response  - Implement non-pharmacological measures as appropriate and evaluate response  - Consider cultural and social influences on pain and pain management  - Notify physician/advanced practitioner if interventions unsuccessful or patient reports new pain  Outcome: Progressing     Problem: INFECTION - ADULT  Goal: Absence or prevention of progression during hospitalization  Description: INTERVENTIONS:  - Assess and monitor for signs and symptoms of infection  - Monitor lab/diagnostic results  - Monitor all insertion sites, i.e. indwelling lines, tubes, and drains  - Monitor endotracheal if appropriate and nasal secretions for changes in amount and color  - [place] appropriate cooling/warming therapies per order  - Administer medications as ordered  - Instruct and encourage patient and family to use good hand hygiene technique  -  Identify and instruct in appropriate isolation precautions for identified infection/condition  Outcome: Progressing     Problem: DISCHARGE PLANNING  Goal: Discharge to home or other facility with appropriate resources  Description: INTERVENTIONS:  - Identify barriers to discharge w/patient and caregiver  - Arrange for needed discharge resources and transportation as appropriate  - Identify discharge learning needs (meds, wound care, etc.)  - Arrange for interpretive services to assist at discharge as needed  - Refer to Case Management Department for coordinating discharge planning if the patient needs post-hospital services based on physician/advanced practitioner order or complex needs related to functional status, cognitive ability, or social support system  Outcome: Progressing     Problem: Knowledge Deficit  Goal: Patient/family/caregiver demonstrates understanding of disease process, treatment plan, medications, and discharge instructions  Description: Complete learning assessment and assess knowledge base.  Interventions:  - Provide teaching at level of understanding  - Provide teaching via preferred learning methods  Outcome: Progressing     Problem: GASTROINTESTINAL - ADULT  Goal: Minimal or absence of nausea and/or vomiting  Description: INTERVENTIONS:  - Administer IV fluids if ordered to ensure adequate hydration  - Maintain NPO status until nausea and vomiting are resolved  - Nasogastric tube if ordered  - Administer ordered antiemetic medications as needed  - Provide nonpharmacologic comfort measures as appropriate  - Advance diet as tolerated, if ordered  - Consider nutrition services referral to assist patient with adequate nutrition and appropriate food choices  Outcome: Progressing  Goal: Maintains or returns to baseline bowel function  Description: INTERVENTIONS:  - Assess bowel function  - Encourage oral fluids to ensure adequate hydration  - Administer IV fluids if ordered to ensure adequate  hydration  - Administer ordered medications as needed  - Encourage mobilization and activity  - Consider nutritional services referral to assist patient with adequate nutrition and appropriate food choices  Outcome: Progressing  Goal: Maintains adequate nutritional intake  Description: INTERVENTIONS:  - Monitor percentage of each meal consumed  - Identify factors contributing to decreased intake, treat as appropriate  - Assist with meals as needed  - Monitor I&O, weight, and lab values if indicated  - Obtain nutrition services referral as needed  Outcome: Progressing     Problem: GENITOURINARY - ADULT  Goal: Maintains or returns to baseline urinary function  Description: INTERVENTIONS:  - Assess urinary function  - Encourage oral fluids to ensure adequate hydration if ordered  - Administer IV fluids as ordered to ensure adequate hydration  - Administer ordered medications as needed  - Offer frequent toileting  - Follow urinary retention protocol if ordered  Outcome: Progressing  Goal: Absence of urinary retention  Description: INTERVENTIONS:  - Assess patient’s ability to void and empty bladder  - Monitor I/O  - Bladder scan as needed  - Discuss with physician/AP medications to alleviate retention as needed  - Discuss catheterization for long term situations as appropriate  Outcome: Progressing  Goal: Urinary catheter remains patent  Description: INTERVENTIONS:  - Assess patency of urinary catheter  - If patient has a chronic dela cruz, consider changing catheter if non-functioning  - Follow guidelines for intermittent irrigation of non-functioning urinary catheter  Outcome: Progressing     Problem: METABOLIC, FLUID AND ELECTROLYTES - ADULT  Goal: Electrolytes maintained within normal limits  Description: INTERVENTIONS:  - Monitor labs and assess patient for signs and symptoms of electrolyte imbalances  - Administer electrolyte replacement as ordered  - Monitor response to electrolyte replacements, including repeat lab  results as appropriate  - Instruct patient on fluid and nutrition as appropriate  Outcome: Progressing  Goal: Fluid balance maintained  Description: INTERVENTIONS:  - Monitor labs   - Monitor I/O and WT  - Instruct patient on fluid and nutrition as appropriate  - Assess for signs & symptoms of volume excess or deficit  Outcome: Progressing  Goal: Glucose maintained within target range  Description: INTERVENTIONS:  - Monitor Blood Glucose as ordered  - Assess for signs and symptoms of hyperglycemia and hypoglycemia  - Administer ordered medications to maintain glucose within target range  - Assess nutritional intake and initiate nutrition service referral as needed  Outcome: Progressing     Problem: Prexisting or High Potential for Compromised Skin Integrity  Goal: Skin integrity is maintained or improved  Description: INTERVENTIONS:  - Identify patients at risk for skin breakdown  - Assess and monitor skin integrity  - Assess and monitor nutrition and hydration status  - Monitor labs   - Assess for incontinence   - Turn and reposition patient  - Assist with mobility/ambulation  - Relieve pressure over bony prominences  - Avoid friction and shearing  - Provide appropriate hygiene as needed including keeping skin clean and dry  - Evaluate need for skin moisturizer/barrier cream  - Collaborate with interdisciplinary team   - Patient/family teaching  - Consider wound care consult   Outcome: Progressing

## 2025-02-11 NOTE — ED NOTES
Small trickle of blood noted coming from suprapubic site when patient rolled to his right side. Bleeding stopped, no blood visualized when patient rolled to his left side. SLIM made aware.     Renee Camargo RN  02/11/25 0513

## 2025-02-11 NOTE — ASSESSMENT & PLAN NOTE
"Lab Results   Component Value Date    HGBA1C 9.0 (H) 10/06/2024       No results for input(s): \"POCGLU\" in the last 72 hours.    Blood Sugar Average: Last 72 hrs:  Oral medications on hold  SSI with Accu-Cheks  Hypoglycemia protocol  Carbohydrate controlled diet  "

## 2025-02-12 LAB
ANION GAP SERPL CALCULATED.3IONS-SCNC: 8 MMOL/L (ref 4–13)
BASOPHILS # BLD AUTO: 0.06 THOUSANDS/ΜL (ref 0–0.1)
BASOPHILS NFR BLD AUTO: 1 % (ref 0–1)
BUN SERPL-MCNC: 12 MG/DL (ref 5–25)
CALCIUM SERPL-MCNC: 9.1 MG/DL (ref 8.4–10.2)
CHLORIDE SERPL-SCNC: 109 MMOL/L (ref 96–108)
CO2 SERPL-SCNC: 21 MMOL/L (ref 21–32)
CREAT SERPL-MCNC: 0.76 MG/DL (ref 0.6–1.3)
EOSINOPHIL # BLD AUTO: 0.42 THOUSAND/ΜL (ref 0–0.61)
EOSINOPHIL NFR BLD AUTO: 4 % (ref 0–6)
ERYTHROCYTE [DISTWIDTH] IN BLOOD BY AUTOMATED COUNT: 16.2 % (ref 11.6–15.1)
GFR SERPL CREATININE-BSD FRML MDRD: 89 ML/MIN/1.73SQ M
GLUCOSE SERPL-MCNC: 100 MG/DL (ref 65–140)
GLUCOSE SERPL-MCNC: 149 MG/DL (ref 65–140)
GLUCOSE SERPL-MCNC: 154 MG/DL (ref 65–140)
GLUCOSE SERPL-MCNC: 175 MG/DL (ref 65–140)
GLUCOSE SERPL-MCNC: 88 MG/DL (ref 65–140)
HCT VFR BLD AUTO: 43.4 % (ref 36.5–49.3)
HGB BLD-MCNC: 13.1 G/DL (ref 12–17)
IMM GRANULOCYTES # BLD AUTO: 0.04 THOUSAND/UL (ref 0–0.2)
IMM GRANULOCYTES NFR BLD AUTO: 0 % (ref 0–2)
LYMPHOCYTES # BLD AUTO: 2.86 THOUSANDS/ΜL (ref 0.6–4.47)
LYMPHOCYTES NFR BLD AUTO: 29 % (ref 14–44)
MAGNESIUM SERPL-MCNC: 1.6 MG/DL (ref 1.9–2.7)
MCH RBC QN AUTO: 27.2 PG (ref 26.8–34.3)
MCHC RBC AUTO-ENTMCNC: 30.2 G/DL (ref 31.4–37.4)
MCV RBC AUTO: 90 FL (ref 82–98)
MONOCYTES # BLD AUTO: 0.77 THOUSAND/ΜL (ref 0.17–1.22)
MONOCYTES NFR BLD AUTO: 8 % (ref 4–12)
NEUTROPHILS # BLD AUTO: 5.84 THOUSANDS/ΜL (ref 1.85–7.62)
NEUTS SEG NFR BLD AUTO: 58 % (ref 43–75)
NRBC BLD AUTO-RTO: 0 /100 WBCS
PLATELET # BLD AUTO: 269 THOUSANDS/UL (ref 149–390)
PMV BLD AUTO: 8.5 FL (ref 8.9–12.7)
POTASSIUM SERPL-SCNC: 4.1 MMOL/L (ref 3.5–5.3)
RBC # BLD AUTO: 4.82 MILLION/UL (ref 3.88–5.62)
SODIUM SERPL-SCNC: 138 MMOL/L (ref 135–147)
WBC # BLD AUTO: 9.99 THOUSAND/UL (ref 4.31–10.16)

## 2025-02-12 PROCEDURE — 82948 REAGENT STRIP/BLOOD GLUCOSE: CPT

## 2025-02-12 PROCEDURE — 80048 BASIC METABOLIC PNL TOTAL CA: CPT | Performed by: INTERNAL MEDICINE

## 2025-02-12 PROCEDURE — 83735 ASSAY OF MAGNESIUM: CPT | Performed by: INTERNAL MEDICINE

## 2025-02-12 PROCEDURE — 85025 COMPLETE CBC W/AUTO DIFF WBC: CPT | Performed by: INTERNAL MEDICINE

## 2025-02-12 PROCEDURE — 99232 SBSQ HOSP IP/OBS MODERATE 35: CPT | Performed by: INTERNAL MEDICINE

## 2025-02-12 RX ORDER — MAGNESIUM HYDROXIDE/ALUMINUM HYDROXICE/SIMETHICONE 120; 1200; 1200 MG/30ML; MG/30ML; MG/30ML
30 SUSPENSION ORAL EVERY 4 HOURS PRN
Status: DISCONTINUED | OUTPATIENT
Start: 2025-02-12 | End: 2025-02-25 | Stop reason: HOSPADM

## 2025-02-12 RX ORDER — NYSTATIN 100000 [USP'U]/G
POWDER TOPICAL 2 TIMES DAILY
Status: DISCONTINUED | OUTPATIENT
Start: 2025-02-12 | End: 2025-02-25 | Stop reason: HOSPADM

## 2025-02-12 RX ORDER — MAGNESIUM SULFATE HEPTAHYDRATE 40 MG/ML
4 INJECTION, SOLUTION INTRAVENOUS ONCE
Status: COMPLETED | OUTPATIENT
Start: 2025-02-12 | End: 2025-02-12

## 2025-02-12 RX ORDER — ONDANSETRON 2 MG/ML
4 INJECTION INTRAMUSCULAR; INTRAVENOUS EVERY 4 HOURS PRN
Status: DISCONTINUED | OUTPATIENT
Start: 2025-02-12 | End: 2025-02-25 | Stop reason: HOSPADM

## 2025-02-12 RX ADMIN — MAGNESIUM SULFATE HEPTAHYDRATE 4 G: 40 INJECTION, SOLUTION INTRAVENOUS at 09:35

## 2025-02-12 RX ADMIN — GABAPENTIN 300 MG: 300 CAPSULE ORAL at 17:17

## 2025-02-12 RX ADMIN — AMLODIPINE BESYLATE 10 MG: 10 TABLET ORAL at 08:42

## 2025-02-12 RX ADMIN — NYSTATIN: 100000 POWDER TOPICAL at 13:00

## 2025-02-12 RX ADMIN — BACLOFEN 5 MG: 10 TABLET ORAL at 17:17

## 2025-02-12 RX ADMIN — BUDESONIDE AND FORMOTEROL FUMARATE DIHYDRATE 2 PUFF: 160; 4.5 AEROSOL RESPIRATORY (INHALATION) at 17:17

## 2025-02-12 RX ADMIN — ENOXAPARIN SODIUM 40 MG: 40 INJECTION SUBCUTANEOUS at 08:43

## 2025-02-12 RX ADMIN — CLOPIDOGREL BISULFATE 75 MG: 75 TABLET ORAL at 08:42

## 2025-02-12 RX ADMIN — SENNOSIDES 17.2 MG: 8.6 TABLET ORAL at 21:36

## 2025-02-12 RX ADMIN — Medication 500 MCG: at 08:43

## 2025-02-12 RX ADMIN — NYSTATIN: 100000 POWDER TOPICAL at 17:42

## 2025-02-12 RX ADMIN — GABAPENTIN 600 MG: 300 CAPSULE ORAL at 21:36

## 2025-02-12 RX ADMIN — INSULIN LISPRO 1 UNITS: 100 INJECTION, SOLUTION INTRAVENOUS; SUBCUTANEOUS at 21:38

## 2025-02-12 RX ADMIN — INSULIN LISPRO 1 UNITS: 100 INJECTION, SOLUTION INTRAVENOUS; SUBCUTANEOUS at 17:17

## 2025-02-12 RX ADMIN — ATORVASTATIN CALCIUM 80 MG: 80 TABLET, FILM COATED ORAL at 17:17

## 2025-02-12 RX ADMIN — ERTAPENEM SODIUM 1000 MG: 1 INJECTION INTRAMUSCULAR; INTRAVENOUS at 03:57

## 2025-02-12 RX ADMIN — POLYETHYLENE GLYCOL 3350 17 G: 17 POWDER, FOR SOLUTION ORAL at 08:40

## 2025-02-12 RX ADMIN — BACLOFEN 5 MG: 10 TABLET ORAL at 08:42

## 2025-02-12 RX ADMIN — PANTOPRAZOLE SODIUM 40 MG: 40 TABLET, DELAYED RELEASE ORAL at 05:00

## 2025-02-12 RX ADMIN — GABAPENTIN 300 MG: 300 CAPSULE ORAL at 08:42

## 2025-02-12 RX ADMIN — MIRTAZAPINE 7.5 MG: 7.5 TABLET, FILM COATED ORAL at 21:37

## 2025-02-12 RX ADMIN — BACLOFEN 5 MG: 10 TABLET ORAL at 20:06

## 2025-02-12 RX ADMIN — LATANOPROST 1 DROP: 50 SOLUTION OPHTHALMIC at 21:37

## 2025-02-12 RX ADMIN — BUDESONIDE AND FORMOTEROL FUMARATE DIHYDRATE 2 PUFF: 160; 4.5 AEROSOL RESPIRATORY (INHALATION) at 08:42

## 2025-02-12 RX ADMIN — PROPRANOLOL HYDROCHLORIDE 60 MG: 60 CAPSULE, EXTENDED RELEASE ORAL at 08:43

## 2025-02-12 RX ADMIN — OXYCODONE 5 MG: 5 TABLET ORAL at 23:46

## 2025-02-12 NOTE — PLAN OF CARE
Problem: Potential for Falls  Goal: Patient will remain free of falls  Description: INTERVENTIONS:  - Educate patient/family on patient safety including physical limitations  - Instruct patient to call for assistance with activity   - Consult OT/PT to assist with strengthening/mobility   - Keep Call bell within reach  - Keep bed low and locked with side rails adjusted as appropriate  - Keep care items and personal belongings within reach  - Initiate and maintain comfort rounds  - Make Fall Risk Sign visible to staff  - Offer Toileting every 2 Hours, in advance of need  - Initiate/Maintain bed alarm  - Obtain necessary fall risk management equipment: alarms   - Apply yellow socks and bracelet for high fall risk patients  - Consider moving patient to room near nurses station  Outcome: Progressing     Problem: PAIN - ADULT  Goal: Verbalizes/displays adequate comfort level or baseline comfort level  Description: Interventions:  - Encourage patient to monitor pain and request assistance  - Assess pain using appropriate pain scale  - Administer analgesics based on type and severity of pain and evaluate response  - Implement non-pharmacological measures as appropriate and evaluate response  - Consider cultural and social influences on pain and pain management  - Notify physician/advanced practitioner if interventions unsuccessful or patient reports new pain  Outcome: Progressing     Problem: INFECTION - ADULT  Goal: Absence or prevention of progression during hospitalization  Description: INTERVENTIONS:  - Assess and monitor for signs and symptoms of infection  - Monitor lab/diagnostic results  - Monitor all insertion sites, i.e. indwelling lines, tubes, and drains  - Monitor endotracheal if appropriate and nasal secretions for changes in amount and color  - Macy appropriate cooling/warming therapies per order  - Administer medications as ordered  - Instruct and encourage patient and family to use good hand hygiene  technique  - Identify and instruct in appropriate isolation precautions for identified infection/condition  Outcome: Progressing     Problem: DISCHARGE PLANNING  Goal: Discharge to home or other facility with appropriate resources  Description: INTERVENTIONS:  - Identify barriers to discharge w/patient and caregiver  - Arrange for needed discharge resources and transportation as appropriate  - Identify discharge learning needs (meds, wound care, etc.)  - Arrange for interpretive services to assist at discharge as needed  - Refer to Case Management Department for coordinating discharge planning if the patient needs post-hospital services based on physician/advanced practitioner order or complex needs related to functional status, cognitive ability, or social support system  Outcome: Progressing     Problem: Knowledge Deficit  Goal: Patient/family/caregiver demonstrates understanding of disease process, treatment plan, medications, and discharge instructions  Description: Complete learning assessment and assess knowledge base.  Interventions:  - Provide teaching at level of understanding  - Provide teaching via preferred learning methods  Outcome: Progressing     Problem: GASTROINTESTINAL - ADULT  Goal: Minimal or absence of nausea and/or vomiting  Description: INTERVENTIONS:  - Administer IV fluids if ordered to ensure adequate hydration  - Maintain NPO status until nausea and vomiting are resolved  - Nasogastric tube if ordered  - Administer ordered antiemetic medications as needed  - Provide nonpharmacologic comfort measures as appropriate  - Advance diet as tolerated, if ordered  - Consider nutrition services referral to assist patient with adequate nutrition and appropriate food choices  Outcome: Progressing  Goal: Maintains or returns to baseline bowel function  Description: INTERVENTIONS:  - Assess bowel function  - Encourage oral fluids to ensure adequate hydration  - Administer IV fluids if ordered to ensure  adequate hydration  - Administer ordered medications as needed  - Encourage mobilization and activity  - Consider nutritional services referral to assist patient with adequate nutrition and appropriate food choices  Outcome: Progressing  Goal: Maintains adequate nutritional intake  Description: INTERVENTIONS:  - Monitor percentage of each meal consumed  - Identify factors contributing to decreased intake, treat as appropriate  - Assist with meals as needed  - Monitor I&O, weight, and lab values if indicated  - Obtain nutrition services referral as needed  Outcome: Progressing     Problem: GENITOURINARY - ADULT  Goal: Maintains or returns to baseline urinary function  Description: INTERVENTIONS:  - Assess urinary function  - Encourage oral fluids to ensure adequate hydration if ordered  - Administer IV fluids as ordered to ensure adequate hydration  - Administer ordered medications as needed  - Offer frequent toileting  - Follow urinary retention protocol if ordered  Outcome: Progressing  Goal: Absence of urinary retention  Description: INTERVENTIONS:  - Assess patient’s ability to void and empty bladder  - Monitor I/O  - Bladder scan as needed  - Discuss with physician/AP medications to alleviate retention as needed  - Discuss catheterization for long term situations as appropriate  Outcome: Progressing  Goal: Urinary catheter remains patent  Description: INTERVENTIONS:  - Assess patency of urinary catheter  - If patient has a chronic dela cruz, consider changing catheter if non-functioning  - Follow guidelines for intermittent irrigation of non-functioning urinary catheter  Outcome: Progressing     Problem: METABOLIC, FLUID AND ELECTROLYTES - ADULT  Goal: Electrolytes maintained within normal limits  Description: INTERVENTIONS:  - Monitor labs and assess patient for signs and symptoms of electrolyte imbalances  - Administer electrolyte replacement as ordered  - Monitor response to electrolyte replacements, including  repeat lab results as appropriate  - Instruct patient on fluid and nutrition as appropriate  Outcome: Progressing  Goal: Fluid balance maintained  Description: INTERVENTIONS:  - Monitor labs   - Monitor I/O and WT  - Instruct patient on fluid and nutrition as appropriate  - Assess for signs & symptoms of volume excess or deficit  Outcome: Progressing  Goal: Glucose maintained within target range  Description: INTERVENTIONS:  - Monitor Blood Glucose as ordered  - Assess for signs and symptoms of hyperglycemia and hypoglycemia  - Administer ordered medications to maintain glucose within target range  - Assess nutritional intake and initiate nutrition service referral as needed  Outcome: Progressing     Problem: Prexisting or High Potential for Compromised Skin Integrity  Goal: Skin integrity is maintained or improved  Description: INTERVENTIONS:  - Identify patients at risk for skin breakdown  - Assess and monitor skin integrity  - Assess and monitor nutrition and hydration status  - Monitor labs   - Assess for incontinence   - Turn and reposition patient  - Assist with mobility/ambulation  - Relieve pressure over bony prominences  - Avoid friction and shearing  - Provide appropriate hygiene as needed including keeping skin clean and dry  - Evaluate need for skin moisturizer/barrier cream  - Collaborate with interdisciplinary team   - Patient/family teaching  - Consider wound care consult   Outcome: Progressing

## 2025-02-12 NOTE — ASSESSMENT & PLAN NOTE
Meeting criteria with tachycardia, tachypnea, leukocytosis, elevated lactic acid  Suspected secondary to UTI  History of MDRO  Continue empiric ertapenem with plan for 3-day course  Preliminary cultures with ertapenem

## 2025-02-12 NOTE — PROGRESS NOTES
Progress Note - Hospitalist   Name: Mathew Rico 74 y.o. male I MRN: [id number]  Unit/Bed#: [facility] [address] -01 I Date of Admission: 2/10/2025   Date of Service: 2/12/2025 I Hospital Day: 2    Assessment & Plan  Severe sepsis (HCC)  Meeting criteria with tachycardia, tachypnea, leukocytosis, elevated lactic acid  Suspected secondary to UTI  History of MDRO  Continue empiric ertapenem with plan for 3-day course  Preliminary cultures with ertapenem  History of CVA (cerebrovascular accident)  Continue PTA Plavix and statin  UTI (urinary tract infection)  Hx MDRO Enterobacter cloacea   Continue ertapenem  Suprapubic catheter exchanged in the emergency department  Type 2 diabetes mellitus without complication, without long-term current use of insulin (HCC)  Continue sliding scale insulin  Multiple sclerosis (HCC)  Hx MS, neurogenic bladder  Continue PTA baclofen and gabapentin  Urinary retention  Neurogenic bladder  SPT in place  Outpatient urology follow-up  Abdominal pain  Presented with severe suprapubic abdominal pain x 1 day  Resolution following SPT exchange and initiation of antibiotics    VTE Pharmacologic Prophylaxis: lovenox     Patient Centered Rounds:  Patient care rounds were performed with nursing    Education and Discussions with Family / Patient: Patient. Called brother telephone approx [phone] but no answer     Time Spent for Care: I have spent a total time of 40 minutes on 02/12/25 in caring for this patient including Diagnostic results, Risks and benefits of tx options, Instructions for management, Patient and family education, Impressions, Counseling / Coordination of care, Documenting in the medical record, Reviewing / ordering tests, medicine, procedures  , and Communicating with other healthcare professionals .      Current Length of Stay: 2 day(s)    Current Patient Status: Inpatient   Certification Statement: The patient will continue to require additional inpatient hospital stay due to need for  IV antibiotics     Discharge Plan: 24 hours     Code Status: Level 1 - Full Code      Subjective:   Patient seen and evaluated at bedside. He feels greatly improved.     Objective:     Vitals:   Temp (24hrs), Av.6 °F (37 °C), Min:98.5 °F (36.9 °C), Max:98.6 °F (37 °C)    Temp:  [98.5 °F (36.9 °C)-98.6 °F (37 °C)] 98.5 °F (36.9 °C)  HR:  [69-79] 77  Resp:  [16-18] 16  BP: (104-136)/(50-67) 136/67  SpO2:  [92 %-97 %] 97 %  Body mass index is 24.26 kg/m².     Input and Output Summary (last 24 hours):       Intake/Output Summary (Last 24 hours) at 2025 1422  Last data filed at 2025 1100  Gross per 24 hour   Intake 960 ml   Output 1500 ml   Net -540 ml       Physical Exam:     Physical Exam  Vitals reviewed.   Constitutional:       General: He is not in acute distress.     Appearance: He is well-developed. He is not toxic-appearing or diaphoretic.      Comments: Chronically ill-appearing   HENT:      Head: Normocephalic and atraumatic.      Mouth/Throat:      Mouth: Mucous membranes are moist.   Eyes:      General: No scleral icterus.     Extraocular Movements: Extraocular movements intact.   Cardiovascular:      Rate and Rhythm: Normal rate and regular rhythm.      Heart sounds: Normal heart sounds.   Pulmonary:      Effort: Pulmonary effort is normal. No respiratory distress.      Breath sounds: Normal breath sounds. No wheezing or rales.   Abdominal:      General: There is no distension.      Palpations: Abdomen is soft.      Tenderness: There is no abdominal tenderness. There is no guarding or rebound.   Musculoskeletal:         General: No swelling, tenderness or deformity.   Skin:     General: Skin is warm and dry.   Neurological:      Mental Status: He is alert.      Comments: Chronic deficits related to MS, atrophy lower extremities   Psychiatric:         Mood and Affect: Mood normal.         Behavior: Behavior normal.         Thought Content: Thought content normal.         Judgment: Judgment  normal.         Additional Data:     Labs: I have reviewed pertinent results     Results from last 7 days   Lab Units 02/12/25  0508   WBC Thousand/uL 9.99   HEMOGLOBIN g/dL 13.1   HEMATOCRIT % 43.4   PLATELETS Thousands/uL 269   SEGS PCT % 58   LYMPHO PCT % 29   MONO PCT % 8   EOS PCT % 4     Results from last 7 days   Lab Units 02/12/25  0508 02/11/25  0516 02/10/25  1846   SODIUM mmol/L 138   < > 139   POTASSIUM mmol/L 4.1   < > 4.2   CHLORIDE mmol/L 109*   < > 106   CO2 mmol/L 21   < > 23   BUN mg/dL 12   < > 17   CREATININE mg/dL 0.76   < > 0.84   ANION GAP mmol/L 8   < > 10   CALCIUM mg/dL 9.1   < > 10.2   ALBUMIN g/dL  --   --  4.0   TOTAL BILIRUBIN mg/dL  --   --  0.34   ALK PHOS U/L  --   --  75   ALT U/L  --   --  6*   AST U/L  --   --  11*   GLUCOSE RANDOM mg/dL 100   < > 87    < > = values in this interval not displayed.     Results from last 7 days   Lab Units 02/10/25  1932   INR  0.94     Results from last 7 days   Lab Units 02/12/25  1129 02/12/25  0759 02/11/25  2127 02/11/25  1658 02/11/25  1206 02/11/25  0814   POC GLUCOSE mg/dl 149* 88 123 131 119 75     Results from last 7 days   Lab Units 02/10/25  1846   HEMOGLOBIN A1C % 6.3*     Results from last 7 days   Lab Units 02/11/25  0404 02/11/25  0027 02/10/25  2214 02/10/25  1954   LACTIC ACID mmol/L 2.1* 2.8* 2.2*  --    PROCALCITONIN ng/ml  --   --   --  <0.05         Imaging: I have reviewed pertinent imaging       Recent Cultures (last 7 days):     Results from last 7 days   Lab Units 02/10/25  2131 02/10/25  1955 02/10/25  1954   BLOOD CULTURE   --  No Growth at 24 hrs. No Growth at 24 hrs.   URINE CULTURE  >100,000 cfu/ml Enterobacter cloacae*  --   --        Last 24 Hours Medication List:   Current Facility-Administered Medications   Medication Dose Route Frequency Provider Last Rate    acetaminophen  650 mg Oral Q6H PRN SHA Heck      aluminum-magnesium hydroxide-simethicone  30 mL Oral Q4H PRN Steve Shook DO       amLODIPine  10 mg Oral Daily Petra S Rodney, CRNP      atorvastatin  80 mg Oral Daily With Dinner Petra S Rodney, CRNP      baclofen  5 mg Oral TID Petra S Rodney, CRNP      budesonide-formoterol  2 puff Inhalation BID Petra S Rodney, CRNP      clopidogrel  75 mg Oral Daily Petra S Rodney, CRNP      cyanocobalamin  500 mcg Oral Daily Petra S Rodney, CRNP      enoxaparin  40 mg Subcutaneous Daily Petra S Rodney, CRNP      ertapenem  1,000 mg Intravenous Q24H Petra S Rodney, CRNP 1,000 mg (02/12/25 0357)    gabapentin  300 mg Oral BID Fernando Pulido, CRNP      gabapentin  600 mg Oral HS Petra S Rodney, CRNP      insulin lispro  1-5 Units Subcutaneous TID AC Petra S Rodney, CRNP      insulin lispro  1-5 Units Subcutaneous HS Petra S Rodney, CRNP      latanoprost  1 drop Both Eyes HS Petra S Rodney, CRNP      melatonin  3 mg Oral HS PRN Petra S Rodney, CRNP      mirtazapine  7.5 mg Oral HS Petra S Rodney, CRNP      nystatin   Topical BID Steve Shook DO      ondansetron  4 mg Intravenous Q4H PRN Steve Shook DO      oxyCODONE  5 mg Oral Q6H PRN Petra S Rodney, CRNP      pantoprazole  40 mg Oral Early Morning Petra S Rodney, CRNP      polyethylene glycol  17 g Oral Daily Petra S Rodney, CRNP      propranolol  60 mg Oral Daily Steve Shook DO      senna  2 tablet Oral HS Petra S Rodney, CRNP          Today, Patient Was Seen By: Steve Shook DO    ** Please Note: Dictation voice to text software may have been used in the creation of this document. **

## 2025-02-12 NOTE — PLAN OF CARE
Problem: PAIN - ADULT  Goal: Verbalizes/displays adequate comfort level or baseline comfort level  Description: Interventions:  - Encourage patient to monitor pain and request assistance  - Assess pain using appropriate pain scale  - Administer analgesics based on type and severity of pain and evaluate response  - Implement non-pharmacological measures as appropriate and evaluate response  - Consider cultural and social influences on pain and pain management  - Notify physician/advanced practitioner if interventions unsuccessful or patient reports new pain  Outcome: Progressing     Problem: INFECTION - ADULT  Goal: Absence or prevention of progression during hospitalization  Description: INTERVENTIONS:  - Assess and monitor for signs and symptoms of infection  - Monitor lab/diagnostic results  - Monitor all insertion sites, i.e. indwelling lines, tubes, and drains  - Monitor endotracheal if appropriate and nasal secretions for changes in amount and color  - [place] appropriate cooling/warming therapies per order  - Administer medications as ordered  - Instruct and encourage patient and family to use good hand hygiene technique  - Identify and instruct in appropriate isolation precautions for identified infection/condition  Outcome: Progressing     Problem: DISCHARGE PLANNING  Goal: Discharge to home or other facility with appropriate resources  Description: INTERVENTIONS:  - Identify barriers to discharge w/patient and caregiver  - Arrange for needed discharge resources and transportation as appropriate  - Identify discharge learning needs (meds, wound care, etc.)  - Arrange for interpretive services to assist at discharge as needed  - Refer to Case Management Department for coordinating discharge planning if the patient needs post-hospital services based on physician/advanced practitioner order or complex needs related to functional status, cognitive ability, or social support system  Outcome: Progressing      Problem: Knowledge Deficit  Goal: Patient/family/caregiver demonstrates understanding of disease process, treatment plan, medications, and discharge instructions  Description: Complete learning assessment and assess knowledge base.  Interventions:  - Provide teaching at level of understanding  - Provide teaching via preferred learning methods  Outcome: Progressing     Problem: GASTROINTESTINAL - ADULT  Goal: Minimal or absence of nausea and/or vomiting  Description: INTERVENTIONS:  - Administer IV fluids if ordered to ensure adequate hydration  - Maintain NPO status until nausea and vomiting are resolved  - Nasogastric tube if ordered  - Administer ordered antiemetic medications as needed  - Provide nonpharmacologic comfort measures as appropriate  - Advance diet as tolerated, if ordered  - Consider nutrition services referral to assist patient with adequate nutrition and appropriate food choices  Outcome: Progressing  Goal: Maintains or returns to baseline bowel function  Description: INTERVENTIONS:  - Assess bowel function  - Encourage oral fluids to ensure adequate hydration  - Administer IV fluids if ordered to ensure adequate hydration  - Administer ordered medications as needed  - Encourage mobilization and activity  - Consider nutritional services referral to assist patient with adequate nutrition and appropriate food choices  Outcome: Progressing  Goal: Maintains adequate nutritional intake  Description: INTERVENTIONS:  - Monitor percentage of each meal consumed  - Identify factors contributing to decreased intake, treat as appropriate  - Assist with meals as needed  - Monitor I&O, weight, and lab values if indicated  - Obtain nutrition services referral as needed  Outcome: Progressing     Problem: GENITOURINARY - ADULT  Goal: Maintains or returns to baseline urinary function  Description: INTERVENTIONS:  - Assess urinary function  - Encourage oral fluids to ensure adequate hydration if ordered  -  Administer IV fluids as ordered to ensure adequate hydration  - Administer ordered medications as needed  - Offer frequent toileting  - Follow urinary retention protocol if ordered  Outcome: Progressing  Goal: Absence of urinary retention  Description: INTERVENTIONS:  - Assess patient’s ability to void and empty bladder  - Monitor I/O  - Bladder scan as needed  - Discuss with physician/AP medications to alleviate retention as needed  - Discuss catheterization for long term situations as appropriate  Outcome: Progressing  Goal: Urinary catheter remains patent  Description: INTERVENTIONS:  - Assess patency of urinary catheter  - If patient has a chronic dela cruz, consider changing catheter if non-functioning  - Follow guidelines for intermittent irrigation of non-functioning urinary catheter  Outcome: Progressing

## 2025-02-12 NOTE — ASSESSMENT & PLAN NOTE
Hx MDRO Enterobacter cloacea   Continue ertapenem  Suprapubic catheter exchanged in the emergency department

## 2025-02-12 NOTE — UTILIZATION REVIEW
Notification of Unplanned, Urgent, or   Emergency Inpatient Admission   AUTHORIZATION REQUEST   [place] [address]  Tax ID: [id number]  NPI: [id number]  [facility] of Service: [facility]  Admission Level of Care: Inpatient  Place of Service Code: 21     Attending Physician Information  Attending Name and NPI#: Steve Bouchra  [[id number]  Phone: [phone]     Admission Information  Inpatient Admission Date/Time: 2/10/25 11:37 PM  Discharge Date/Time: No discharge date for patient encounter.  Admitting Diagnosis Code/Description:  UTI (urinary tract infection) [N39.0]  Abdominal pain [R10.9]  Suprapubic catheter (HCC) [Z93.59]     Utilization Review Contact  Marsha Garcia Utilization   Phone: [phone]  Fax: [phone]  Email: [email or web address]  Contact for approvals/pending authorizations, clinical reviews, and discharge.     Physician Advisory Services Contact  Medical Necessity Denial & [id number] Discussion  Phone: [phone]  Fax: [phone]  Email: [email or web address]     DISCHARGE SUPPORT TEAM:  For Patients Discharge Needs & Updates  Phone: [phone] opt. 2 Fax: [phone]  Email: [email or web address]

## 2025-02-12 NOTE — ASSESSMENT & PLAN NOTE
Presented with severe suprapubic abdominal pain x 1 day  Resolution following SPT exchange and initiation of antibiotics

## 2025-02-12 NOTE — CASE MANAGEMENT
Case Management Discharge Planning Note    Patient name Mathew Rico  Location [facility] 2 M* MRN [id number]  : 1950 Date 2025       Current Admission Date: 2/10/2025  Current Admission Diagnosis:Severe sepsis (HCC)   Patient Active Problem List    Diagnosis Date Noted Date Diagnosed    Abdominal pain 2025     Mild protein-calorie malnutrition (HCC) 2025     Urinary obstruction 2025     History of stroke 2024     Bladder wall thickening 2024     Pain of left hip 2024     Left leg pain 2024     Dizziness 2024     Pruritus 2024     Asymptomatic bacteriuria 2024     Blurred vision, bilateral 2024     Symptoms of upper respiratory infection (URI) 06/15/2024     Chest pain 2024     Acute encephalopathy 2024     GERSON on CPAP 2024     Fall 2024     Primary hypertension 2024     Type 2 diabetes mellitus without complication, without long-term current use of insulin ([facility]) 2024     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HCC) 2024     Multiple sclerosis (HCC) 2024     Urinary retention 2024     Neck pain 2024     Vitamin B deficiency 2024     Generalized weakness 2024     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     UTI (urinary tract infection) 02/15/2024     Fall 2023     Weakness 2023     Accidental fall from chair 2023     Constipation 2023     Chronic diastolic congestive heart failure (HCC) 2023     Hyponatremia 2023     Excessive daytime sleepiness 2022     Shortness of breath 2022     Severe sepsis (HCC) 2021     Pancreatic mass 2020     Pancreatic lesion 2020     Bladder stones 2019     Bladder neck contracture 2019     History of CVA (cerebrovascular accident) 10/21/2018     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HCC) 2017     Diabetic neuropathy (HCC) 2016     Neurogenic bladder 2016      Thyroid nodule 09/02/2015     Cervical spinal stenosis 12/11/2014     Generalized anxiety disorder 07/13/2014     Obstructive sleep apnea 01/04/2013     Benign colon polyp 06/19/2012     Esophageal reflux 06/19/2012     Fatty liver 06/19/2012     Glaucoma 06/19/2012     Hyperlipidemia 06/19/2012     Multiple sclerosis (HCC) 06/19/2012     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) 06/19/2012     Primary hypertension 06/11/2012       LOS (days): 2  Geometric Mean LOS (GMLOS) (days): 3.5  Days to GMLOS:1.8     OBJECTIVE:  Risk of Unplanned Readmission Score: 54.74         Current admission status: Inpatient   Preferred Pharmacy:   [facility]/pharmacy #[id number] - [place], [place] - [address] [facility] [facility]  [address] [facility] [address]  Phone: [phone] Fax: [phone]    [place], [place] - [address]  [address] [facility] [address]  Phone: [phone] Fax: [phone]    [facility] [place], [place] - [address]  [facility],  [address]  [facility],  First Floor [facility] [address]  Phone: [phone] Fax: [phone]     PHARMACY [place], [place] - [address] [place] [address]  Phone: [phone] Fax: [phone]    Primary Care Provider: Caroilna Kumari MD    Primary Insurance: [facility]  Secondary Insurance:     DISCHARGE DETAILS:           Additional Comments: LSW covering case today. Rec a message from [facility] ([phone] ext [phone]), who stated they had family meeting and would like pt to go to SNF when discharge from the hospital. They have contract with [facility] and [facility]. Referrals made today and will f/u with family. Will need PT/OT evals.

## 2025-02-12 NOTE — CASE MANAGEMENT
Case Management Discharge Planning Note    Patient name Mathew Rico  Location [facility] 2 [facility] 2 M* MRN [id number]  : 1950 Date 2025       Current Admission Date: 2/10/2025  Current Admission Diagnosis:Severe sepsis (HCC)   Patient Active Problem List    Diagnosis Date Noted Date Diagnosed    Abdominal pain 2025     Mild protein-calorie malnutrition (HCC) 2025     Urinary obstruction 2025     History of stroke 2024     Bladder wall thickening 2024     Pain of left hip 2024     Left leg pain 2024     Dizziness 2024     Pruritus 2024     Asymptomatic bacteriuria 2024     Blurred vision, bilateral 2024     Symptoms of upper respiratory infection (URI) 06/15/2024     Chest pain 2024     Acute encephalopathy 2024     GERSON on CPAP 2024     Fall 2024     Primary hypertension 2024     Type 2 diabetes mellitus without complication, without long-term current use of insulin ([facility]) 2024     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HCC) 2024     Multiple sclerosis (HCC) 2024     Urinary retention 2024     Neck pain 2024     Vitamin B deficiency 2024     Generalized weakness 2024     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     UTI (urinary tract infection) 02/15/2024     Fall 2023     Weakness 2023     Accidental fall from chair 2023     Constipation 2023     Chronic diastolic congestive heart failure (HCC) 2023     Hyponatremia 2023     Excessive daytime sleepiness 2022     Shortness of breath 2022     Severe sepsis (HCC) 2021     Pancreatic mass 2020     Pancreatic lesion 2020     Bladder stones 2019     Bladder neck contracture 2019     History of CVA (cerebrovascular accident) 10/21/2018     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HCC) 2017     Diabetic neuropathy (HCC) 2016     Neurogenic bladder 2016      Thyroid nodule 09/02/2015     Cervical spinal stenosis 12/11/2014     Generalized anxiety disorder 07/13/2014     Obstructive sleep apnea 01/04/2013     Benign colon polyp 06/19/2012     Esophageal reflux 06/19/2012     Fatty liver 06/19/2012     Glaucoma 06/19/2012     Hyperlipidemia 06/19/2012     Multiple sclerosis (HCC) 06/19/2012     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) 06/19/2012     Primary hypertension 06/11/2012       LOS (days): 2  Geometric Mean LOS (GMLOS) (days): 3.5  Days to GMLOS:1.8     OBJECTIVE:  Risk of Unplanned Readmission Score: 54.74         Current admission status: Inpatient   Preferred Pharmacy:   [facility]/pharmacy #[id number] - [place] - [address]  [address] [facility] [address]  Phone: [phone] Fax: [phone]    [place], [place] - [address]  [address] [facility] [address]  Phone: [phone] Fax: [phone]    [facility] [place], [place] - [address]  [facility],  [address]  [facility],  First Floor [facility] [address]  Phone: [phone] Fax: [phone]     PHARMACY [place], [place] - [address] [place] [address]  Phone: [phone] Fax: [phone]    Primary Care Provider: Carolina Kumari MD    Primary Insurance: [facility]  Secondary Insurance:     DISCHARGE DETAILS:         Additional Comments: Left [facility] a message to check if family was looking for short term or long term. Will need to f/u .

## 2025-02-13 LAB
ANION GAP SERPL CALCULATED.3IONS-SCNC: 8 MMOL/L (ref 4–13)
BACTERIA UR CULT: ABNORMAL
BACTERIA UR CULT: ABNORMAL
BUN SERPL-MCNC: 9 MG/DL (ref 5–25)
CALCIUM SERPL-MCNC: 9.2 MG/DL (ref 8.4–10.2)
CHLORIDE SERPL-SCNC: 106 MMOL/L (ref 96–108)
CO2 SERPL-SCNC: 22 MMOL/L (ref 21–32)
CREAT SERPL-MCNC: 0.76 MG/DL (ref 0.6–1.3)
ERYTHROCYTE [DISTWIDTH] IN BLOOD BY AUTOMATED COUNT: 16.2 % (ref 11.6–15.1)
GFR SERPL CREATININE-BSD FRML MDRD: 89 ML/MIN/1.73SQ M
GLUCOSE SERPL-MCNC: 103 MG/DL (ref 65–140)
GLUCOSE SERPL-MCNC: 110 MG/DL (ref 65–140)
GLUCOSE SERPL-MCNC: 116 MG/DL (ref 65–140)
GLUCOSE SERPL-MCNC: 143 MG/DL (ref 65–140)
GLUCOSE SERPL-MCNC: 188 MG/DL (ref 65–140)
HCT VFR BLD AUTO: 41.5 % (ref 36.5–49.3)
HGB BLD-MCNC: 13.1 G/DL (ref 12–17)
MCH RBC QN AUTO: 27.9 PG (ref 26.8–34.3)
MCHC RBC AUTO-ENTMCNC: 31.6 G/DL (ref 31.4–37.4)
MCV RBC AUTO: 89 FL (ref 82–98)
PLATELET # BLD AUTO: 265 THOUSANDS/UL (ref 149–390)
PMV BLD AUTO: 8.4 FL (ref 8.9–12.7)
POTASSIUM SERPL-SCNC: 3.9 MMOL/L (ref 3.5–5.3)
RBC # BLD AUTO: 4.69 MILLION/UL (ref 3.88–5.62)
SODIUM SERPL-SCNC: 136 MMOL/L (ref 135–147)
WBC # BLD AUTO: 12.88 THOUSAND/UL (ref 4.31–10.16)

## 2025-02-13 PROCEDURE — 82948 REAGENT STRIP/BLOOD GLUCOSE: CPT

## 2025-02-13 PROCEDURE — 80048 BASIC METABOLIC PNL TOTAL CA: CPT | Performed by: INTERNAL MEDICINE

## 2025-02-13 PROCEDURE — 85027 COMPLETE CBC AUTOMATED: CPT | Performed by: INTERNAL MEDICINE

## 2025-02-13 PROCEDURE — 99232 SBSQ HOSP IP/OBS MODERATE 35: CPT | Performed by: INTERNAL MEDICINE

## 2025-02-13 RX ADMIN — POLYETHYLENE GLYCOL 3350 17 G: 17 POWDER, FOR SOLUTION ORAL at 10:36

## 2025-02-13 RX ADMIN — ENOXAPARIN SODIUM 40 MG: 40 INJECTION SUBCUTANEOUS at 10:37

## 2025-02-13 RX ADMIN — BACLOFEN 5 MG: 10 TABLET ORAL at 10:36

## 2025-02-13 RX ADMIN — SENNOSIDES 17.2 MG: 8.6 TABLET ORAL at 23:00

## 2025-02-13 RX ADMIN — BACLOFEN 5 MG: 10 TABLET ORAL at 17:13

## 2025-02-13 RX ADMIN — MIRTAZAPINE 7.5 MG: 7.5 TABLET, FILM COATED ORAL at 23:00

## 2025-02-13 RX ADMIN — AMLODIPINE BESYLATE 10 MG: 10 TABLET ORAL at 10:37

## 2025-02-13 RX ADMIN — NYSTATIN: 100000 POWDER TOPICAL at 17:16

## 2025-02-13 RX ADMIN — BUDESONIDE AND FORMOTEROL FUMARATE DIHYDRATE 2 PUFF: 160; 4.5 AEROSOL RESPIRATORY (INHALATION) at 17:13

## 2025-02-13 RX ADMIN — BUDESONIDE AND FORMOTEROL FUMARATE DIHYDRATE 2 PUFF: 160; 4.5 AEROSOL RESPIRATORY (INHALATION) at 10:42

## 2025-02-13 RX ADMIN — PANTOPRAZOLE SODIUM 40 MG: 40 TABLET, DELAYED RELEASE ORAL at 05:08

## 2025-02-13 RX ADMIN — BACLOFEN 5 MG: 10 TABLET ORAL at 23:00

## 2025-02-13 RX ADMIN — NYSTATIN: 100000 POWDER TOPICAL at 10:38

## 2025-02-13 RX ADMIN — Medication 500 MCG: at 10:37

## 2025-02-13 RX ADMIN — LATANOPROST 1 DROP: 50 SOLUTION OPHTHALMIC at 23:03

## 2025-02-13 RX ADMIN — PROPRANOLOL HYDROCHLORIDE 60 MG: 60 CAPSULE, EXTENDED RELEASE ORAL at 10:38

## 2025-02-13 RX ADMIN — ERTAPENEM SODIUM 1000 MG: 1 INJECTION INTRAMUSCULAR; INTRAVENOUS at 03:47

## 2025-02-13 RX ADMIN — GABAPENTIN 600 MG: 300 CAPSULE ORAL at 23:00

## 2025-02-13 RX ADMIN — CLOPIDOGREL BISULFATE 75 MG: 75 TABLET ORAL at 10:37

## 2025-02-13 RX ADMIN — ACETAMINOPHEN 325MG 650 MG: 325 TABLET ORAL at 06:38

## 2025-02-13 RX ADMIN — INSULIN LISPRO 1 UNITS: 100 INJECTION, SOLUTION INTRAVENOUS; SUBCUTANEOUS at 22:59

## 2025-02-13 RX ADMIN — GABAPENTIN 300 MG: 300 CAPSULE ORAL at 17:12

## 2025-02-13 RX ADMIN — GABAPENTIN 300 MG: 300 CAPSULE ORAL at 10:37

## 2025-02-13 RX ADMIN — ATORVASTATIN CALCIUM 80 MG: 80 TABLET, FILM COATED ORAL at 17:13

## 2025-02-13 NOTE — PLAN OF CARE
Problem: Potential for Falls  Goal: Patient will remain free of falls  Description: INTERVENTIONS:  - Educate patient/family on patient safety including physical limitations  - Instruct patient to call for assistance with activity   - Consult OT/PT to assist with strengthening/mobility   - Keep Call bell within reach  - Keep bed low and locked with side rails adjusted as appropriate  - Keep care items and personal belongings within reach  - Initiate and maintain comfort rounds  - Make Fall Risk Sign visible to staff  - Offer Toileting every 2 Hours, in advance of need  - Initiate/Maintain bed alarm  - Obtain necessary fall risk management equipment: alarm   - Apply yellow socks and bracelet for high fall risk patients  - Consider moving patient to room near nurses station  2/13/2025 1532 by Tess Kaur RN  Outcome: Progressing  2/13/2025 1532 by Tess Kaur RN  Outcome: Progressing     Problem: PAIN - ADULT  Goal: Verbalizes/displays adequate comfort level or baseline comfort level  Description: Interventions:  - Encourage patient to monitor pain and request assistance  - Assess pain using appropriate pain scale  - Administer analgesics based on type and severity of pain and evaluate response  - Implement non-pharmacological measures as appropriate and evaluate response  - Consider cultural and social influences on pain and pain management  - Notify physician/advanced practitioner if interventions unsuccessful or patient reports new pain  2/13/2025 1532 by Tess Kaur RN  Outcome: Progressing  2/13/2025 1532 by Tess Kaur RN  Outcome: Progressing     Problem: INFECTION - ADULT  Goal: Absence or prevention of progression during hospitalization  Description: INTERVENTIONS:  - Assess and monitor for signs and symptoms of infection  - Monitor lab/diagnostic results  - Monitor all insertion sites, i.e. indwelling lines, tubes, and drains  - Monitor endotracheal if appropriate and nasal secretions  for changes in amount and color  - Hedley appropriate cooling/warming therapies per order  - Administer medications as ordered  - Instruct and encourage patient and family to use good hand hygiene technique  - Identify and instruct in appropriate isolation precautions for identified infection/condition  2/13/2025 1532 by Tess Kaur RN  Outcome: Progressing  2/13/2025 1532 by Tess Kaur RN  Outcome: Progressing     Problem: DISCHARGE PLANNING  Goal: Discharge to home or other facility with appropriate resources  Description: INTERVENTIONS:  - Identify barriers to discharge w/patient and caregiver  - Arrange for needed discharge resources and transportation as appropriate  - Identify discharge learning needs (meds, wound care, etc.)  - Arrange for interpretive services to assist at discharge as needed  - Refer to Case Management Department for coordinating discharge planning if the patient needs post-hospital services based on physician/advanced practitioner order or complex needs related to functional status, cognitive ability, or social support system  2/13/2025 1532 by Tess Kaur RN  Outcome: Progressing  2/13/2025 1532 by Tess Kaur RN  Outcome: Progressing     Problem: Knowledge Deficit  Goal: Patient/family/caregiver demonstrates understanding of disease process, treatment plan, medications, and discharge instructions  Description: Complete learning assessment and assess knowledge base.  Interventions:  - Provide teaching at level of understanding  - Provide teaching via preferred learning methods  2/13/2025 1532 by Tess Kaur RN  Outcome: Progressing  2/13/2025 1532 by Tess Kaur RN  Outcome: Progressing     Problem: GASTROINTESTINAL - ADULT  Goal: Minimal or absence of nausea and/or vomiting  Description: INTERVENTIONS:  - Administer IV fluids if ordered to ensure adequate hydration  - Maintain NPO status until nausea and vomiting are resolved  - Nasogastric tube if  ordered  - Administer ordered antiemetic medications as needed  - Provide nonpharmacologic comfort measures as appropriate  - Advance diet as tolerated, if ordered  - Consider nutrition services referral to assist patient with adequate nutrition and appropriate food choices  2/13/2025 1532 by Tess Kaur RN  Outcome: Progressing  2/13/2025 1532 by Tess Kaur RN  Outcome: Progressing  Goal: Maintains or returns to baseline bowel function  Description: INTERVENTIONS:  - Assess bowel function  - Encourage oral fluids to ensure adequate hydration  - Administer IV fluids if ordered to ensure adequate hydration  - Administer ordered medications as needed  - Encourage mobilization and activity  - Consider nutritional services referral to assist patient with adequate nutrition and appropriate food choices  2/13/2025 1532 by Tess Kaur RN  Outcome: Progressing  2/13/2025 1532 by Tess Kaur RN  Outcome: Progressing  Goal: Maintains adequate nutritional intake  Description: INTERVENTIONS:  - Monitor percentage of each meal consumed  - Identify factors contributing to decreased intake, treat as appropriate  - Assist with meals as needed  - Monitor I&O, weight, and lab values if indicated  - Obtain nutrition services referral as needed  2/13/2025 1532 by Tess Kaur RN  Outcome: Progressing  2/13/2025 1532 by Tess Kaur RN  Outcome: Progressing     Problem: GENITOURINARY - ADULT  Goal: Maintains or returns to baseline urinary function  Description: INTERVENTIONS:  - Assess urinary function  - Encourage oral fluids to ensure adequate hydration if ordered  - Administer IV fluids as ordered to ensure adequate hydration  - Administer ordered medications as needed  - Offer frequent toileting  - Follow urinary retention protocol if ordered  2/13/2025 1532 by Tess Kaur RN  Outcome: Progressing  2/13/2025 1532 by Tess Kaur RN  Outcome: Progressing  Goal: Absence of urinary  retention  Description: INTERVENTIONS:  - Assess patient’s ability to void and empty bladder  - Monitor I/O  - Bladder scan as needed  - Discuss with physician/AP medications to alleviate retention as needed  - Discuss catheterization for long term situations as appropriate  2/13/2025 1532 by Tess Kaur RN  Outcome: Progressing  2/13/2025 1532 by Tess Kaur RN  Outcome: Progressing  Goal: Urinary catheter remains patent  Description: INTERVENTIONS:  - Assess patency of urinary catheter  - If patient has a chronic dela cruz, consider changing catheter if non-functioning  - Follow guidelines for intermittent irrigation of non-functioning urinary catheter  2/13/2025 1532 by Tess Kaur RN  Outcome: Progressing  2/13/2025 1532 by Tess Kaur RN  Outcome: Progressing     Problem: METABOLIC, FLUID AND ELECTROLYTES - ADULT  Goal: Electrolytes maintained within normal limits  Description: INTERVENTIONS:  - Monitor labs and assess patient for signs and symptoms of electrolyte imbalances  - Administer electrolyte replacement as ordered  - Monitor response to electrolyte replacements, including repeat lab results as appropriate  - Instruct patient on fluid and nutrition as appropriate  2/13/2025 1532 by Tess Kaur RN  Outcome: Progressing  2/13/2025 1532 by Tess Kaur RN  Outcome: Progressing  Goal: Fluid balance maintained  Description: INTERVENTIONS:  - Monitor labs   - Monitor I/O and WT  - Instruct patient on fluid and nutrition as appropriate  - Assess for signs & symptoms of volume excess or deficit  Outcome: Progressing  Goal: Glucose maintained within target range  Description: INTERVENTIONS:  - Monitor Blood Glucose as ordered  - Assess for signs and symptoms of hyperglycemia and hypoglycemia  - Administer ordered medications to maintain glucose within target range  - Assess nutritional intake and initiate nutrition service referral as needed  Outcome: Progressing     Problem: Prexisting  or High Potential for Compromised Skin Integrity  Goal: Skin integrity is maintained or improved  Description: INTERVENTIONS:  - Identify patients at risk for skin breakdown  - Assess and monitor skin integrity  - Assess and monitor nutrition and hydration status  - Monitor labs   - Assess for incontinence   - Turn and reposition patient  - Assist with mobility/ambulation  - Relieve pressure over bony prominences  - Avoid friction and shearing  - Provide appropriate hygiene as needed including keeping skin clean and dry  - Evaluate need for skin moisturizer/barrier cream  - Collaborate with interdisciplinary team   - Patient/family teaching  - Consider wound care consult   Outcome: Progressing     Problem: Nutrition/Hydration-ADULT  Goal: Nutrient/Hydration intake appropriate for improving, restoring or maintaining nutritional needs  Description: Monitor and assess patient's nutrition/hydration status for malnutrition. Collaborate with interdisciplinary team and initiate plan and interventions as ordered.  Monitor patient's weight and dietary intake as ordered or per policy. Utilize nutrition screening tool and intervene as necessary. Determine patient's food preferences and provide high-protein, high-caloric foods as appropriate.     INTERVENTIONS:  - Monitor oral intake, urinary output, labs, and treatment plans  - Assess nutrition and hydration status and recommend course of action  - Evaluate amount of meals eaten  - Assist patient with eating if necessary   - Allow adequate time for meals  - Recommend/ encourage appropriate diets, oral nutritional supplements, and vitamin/mineral supplements  - Order, calculate, and assess calorie counts as needed  - Recommend, monitor, and adjust tube feedings and TPN/PPN based on assessed needs  - Assess need for intravenous fluids  - Provide specific nutrition/hydration education as appropriate  - Include patient/family/caregiver in decisions related to nutrition  Outcome:  Progressing

## 2025-02-13 NOTE — PROGRESS NOTES
Progress Note - Hospitalist   Name: Mathew Rico 74 y.o. male I MRN: [id number]  Unit/Bed#: [place] [address] -01 I Date of Admission: 2/10/2025   Date of Service: 2/13/2025 I Hospital Day: 3    Assessment & Plan  Severe sepsis (HCC)  Meeting criteria with tachycardia, tachypnea, leukocytosis, elevated lactic acid  Suspected secondary to UTI  History of MDRO  Urine cultures with enteric coccus sensitive to ertapenem  Completed 3-day course of ertapenem  White blood cell count trended up today we will continue to monitor fever and will be WBC curve over next 24 hours  If patient remains clinically stable, medically stable for discharge to SNF  History of CVA (cerebrovascular accident)  Continue PTA Plavix and statin  UTI (urinary tract infection)  Hx MDRO Enterobacter cloacea   Suprapubic catheter exchanged in the emergency department  Management as above  Type 2 diabetes mellitus without complication, without long-term current use of insulin (HCC)  Continue sliding scale insulin  Multiple sclerosis (HCC)  Hx MS, neurogenic bladder  Continue PTA baclofen and gabapentin  Managed by [facility], family wishes for patient to go to snf on discharge  Case management following   Urinary retention  Neurogenic bladder  SPT in place  Outpatient urology follow-up  Abdominal pain  Presented with severe suprapubic abdominal pain x 1 day  Resolution following SPT exchange and initiation of antibiotics    VTE Pharmacologic Prophylaxis: lovenox     Patient Centered Rounds:  Patient care rounds were performed with nursing    Education and Discussions with Family / Patient: Patient     Time Spent for Care: I have spent a total time of 40 minutes on 02/13/25 in caring for this patient including Diagnostic results, Impressions, Counseling / Coordination of care, Documenting in the medical record, Reviewing / ordering tests, medicine, procedures  , and Communicating with other healthcare professionals .      Current Length of Stay: 3  day(s)    Current Patient Status: Inpatient   Certification Statement: The patient will continue to require additional inpatient hospital stay due to need for management of sepsis/uti     Discharge Plan: 24 hours     Code Status: Level 1 - Full Code      Subjective:   Patient seen and evaluated at bedside. He feels well.     Objective:     Vitals:   Temp (24hrs), Av.7 °F (37.1 °C), Min:98.6 °F (37 °C), Max:98.8 °F (37.1 °C)    Temp:  [98.6 °F (37 °C)-98.8 °F (37.1 °C)] 98.8 °F (37.1 °C)  HR:  [71-77] 71  Resp:  [17-18] 18  BP: (134-143)/(65-68) 137/65  SpO2:  [95 %-96 %] 96 %  Body mass index is 24.26 kg/m².     Input and Output Summary (last 24 hours):       Intake/Output Summary (Last 24 hours) at 2025 1443  Last data filed at 2025 1052  Gross per 24 hour   Intake 530 ml   Output 3300 ml   Net -2770 ml       Physical Exam:     Physical Exam    Additional Data:     Labs: I have reviewed pertinent results     Results from last 7 days   Lab Units 25  0456 25  0508   WBC Thousand/uL 12.88* 9.99   HEMOGLOBIN g/dL 13.1 13.1   HEMATOCRIT % 41.5 43.4   PLATELETS Thousands/uL 265 269   SEGS PCT %  --  58   LYMPHO PCT %  --  29   MONO PCT %  --  8   EOS PCT %  --  4     Results from last 7 days   Lab Units 25  0637 25  0516 02/10/25  1846   SODIUM mmol/L 136   < > 139   POTASSIUM mmol/L 3.9   < > 4.2   CHLORIDE mmol/L 106   < > 106   CO2 mmol/L 22   < > 23   BUN mg/dL 9   < > 17   CREATININE mg/dL 0.76   < > 0.84   ANION GAP mmol/L 8   < > 10   CALCIUM mg/dL 9.2   < > 10.2   ALBUMIN g/dL  --   --  4.0   TOTAL BILIRUBIN mg/dL  --   --  0.34   ALK PHOS U/L  --   --  75   ALT U/L  --   --  6*   AST U/L  --   --  11*   GLUCOSE RANDOM mg/dL 110   < > 87    < > = values in this interval not displayed.     Results from last 7 days   Lab Units 02/10/25  1932   INR  0.94     Results from last 7 days   Lab Units 25  1033 25  0611 25  2045 25  1615 25  1129  02/12/25  0759 02/11/25  2127 02/11/25  1658 02/11/25  1206 02/11/25  0814   POC GLUCOSE mg/dl 103 116 154* 175* 149* 88 123 131 119 75     Results from last 7 days   Lab Units 02/10/25  1846   HEMOGLOBIN A1C % 6.3*     Results from last 7 days   Lab Units 02/11/25  0404 02/11/25  0027 02/10/25  2214 02/10/25  1954   LACTIC ACID mmol/L 2.1* 2.8* 2.2*  --    PROCALCITONIN ng/ml  --   --   --  <0.05         Imaging: I have reviewed pertinent imaging       Recent Cultures (last 7 days):     Results from last 7 days   Lab Units 02/10/25  2131 02/10/25  1955 02/10/25  1954   BLOOD CULTURE   --  No Growth at 48 hrs. No Growth at 48 hrs.   URINE CULTURE  >100,000 cfu/ml Enterobacter cloacae*  50,000-59,000 cfu/ml  --   --        Last 24 Hours Medication List:   Current Facility-Administered Medications   Medication Dose Route Frequency Provider Last Rate    acetaminophen  650 mg Oral Q6H PRN Petra S Rodney, CRNP      aluminum-magnesium hydroxide-simethicone  30 mL Oral Q4H PRN Steve Shook DO      amLODIPine  10 mg Oral Daily Petra S Rodney, CRNP      atorvastatin  80 mg Oral Daily With Dinner Petra S Rodney, CRNP      baclofen  5 mg Oral TID Petra S Rodney, CRNP      budesonide-formoterol  2 puff Inhalation BID Petra S Rodney, CRNP      clopidogrel  75 mg Oral Daily Petra S Rodney, CRNP      cyanocobalamin  500 mcg Oral Daily Petra S Rodney, CRNP      enoxaparin  40 mg Subcutaneous Daily Petra S Rodney, CRNP      gabapentin  300 mg Oral BID Fernando Pulido, CRNP      gabapentin  600 mg Oral HS Petra S Rodney, CRNP      insulin lispro  1-5 Units Subcutaneous TID AC Petra S Rodney, CRNP      insulin lispro  1-5 Units Subcutaneous HS Petra S Rodney, CRNP      latanoprost  1 drop Both Eyes HS Petra S Rodney, CRNP      melatonin  3 mg Oral HS PRN Petra S Rodney, CRNP      mirtazapine  7.5 mg Oral HS Petra S Rodney, CRNP      nystatin   Topical BID Steve Shook DO      ondansetron  4 mg Intravenous Q4H PRN Steve  DO Bouchra      oxyCODONE  5 mg Oral Q6H PRN Petra S Rodney, CRNP      pantoprazole  40 mg Oral Early Morning Petra S Rodney, CRNP      polyethylene glycol  17 g Oral Daily Petra S Rodney, CRNP      propranolol  60 mg Oral Daily Steve Shook DO      senna  2 tablet Oral HS Petra S Rodney, CRNP          Today, Patient Was Seen By: Steve Shook DO    ** Please Note: Dictation voice to text software may have been used in the creation of this document. **

## 2025-02-13 NOTE — ASSESSMENT & PLAN NOTE
Hx MDRO Enterobacter cloacea   Suprapubic catheter exchanged in the emergency department  Management as above

## 2025-02-13 NOTE — ASSESSMENT & PLAN NOTE
Meeting criteria with tachycardia, tachypnea, leukocytosis, elevated lactic acid  Suspected secondary to UTI  History of MDRO  Urine cultures with enteric coccus sensitive to ertapenem  Completed 3-day course of ertapenem  White blood cell count trended up today we will continue to monitor fever and will be WBC curve over next 24 hours  If patient remains clinically stable, medically stable for discharge to SNF

## 2025-02-13 NOTE — CASE MANAGEMENT
Case Management Assessment & Discharge Planning Note    Patient name Mathew Rico  Location [place] [address] /South 2 M* MRN [id number]  : 1950 Date 2025       Current Admission Date: 2/10/2025  Current Admission Diagnosis:Severe sepsis (HCC)   Patient Active Problem List    Diagnosis Date Noted Date Diagnosed    Abdominal pain 2025     Mild protein-calorie malnutrition (HCC) 2025     Urinary obstruction 2025     History of stroke 2024     Bladder wall thickening 2024     Pain of left hip 2024     Left leg pain 2024     Dizziness 2024     Pruritus 2024     Asymptomatic bacteriuria 2024     Blurred vision, bilateral 2024     Symptoms of upper respiratory infection (URI) 06/15/2024     Chest pain 2024     Acute encephalopathy 2024     GERSON on CPAP 2024     Fall 2024     Primary hypertension 2024     Type 2 diabetes mellitus without complication, without long-term current use of insulin (HCC) 2024     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HCC) 2024     Multiple sclerosis (HCC) 2024     Urinary retention 2024     Neck pain 2024     Vitamin B deficiency 2024     Generalized weakness 2024     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     UTI (urinary tract infection) 02/15/2024     Fall 2023     Weakness 2023     Accidental fall from chair 2023     Constipation 2023     Chronic diastolic congestive heart failure (HCC) 2023     Hyponatremia 2023     Excessive daytime sleepiness 2022     Shortness of breath 2022     Severe sepsis (HCC) 2021     Pancreatic mass 2020     Pancreatic lesion 2020     Bladder stones 2019     Bladder neck contracture 2019     History of CVA (cerebrovascular accident) 10/21/2018     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HCC) 2017     Diabetic neuropathy (HCC) 2016     Neurogenic bladder  05/16/2016     Thyroid nodule 09/02/2015     Cervical spinal stenosis 12/11/2014     Generalized anxiety disorder 07/13/2014     Obstructive sleep apnea 01/04/2013     Benign colon polyp 06/19/2012     Esophageal reflux 06/19/2012     Fatty liver 06/19/2012     Glaucoma 06/19/2012     Hyperlipidemia 06/19/2012     Multiple sclerosis (HCC) 06/19/2012     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) 06/19/2012     Primary hypertension 06/11/2012       LOS (days): 3  Geometric Mean LOS (GMLOS) (days): 3.5  Days to GMLOS:1     OBJECTIVE:  PATIENT READMITTED TO HOSPITAL  Risk of Unplanned Readmission Score: 55.03         Current admission status: Inpatient       Preferred Pharmacy:   [facility]/pharmacy #[id number] - [place], [place] - [address]  [address] [address]  Phone: [phone] Fax: [phone]    [place], [place] - [address]  [address] [address]  Phone: [phone] Fax: [phone]    [facility] [place], [place] - [address],  [address],  [address] [facility] [address]  Phone: [phone] Fax: [phone]     [facility] [place] - [address] [place] [address]  Phone: [phone] Fax: [phone]    Primary Care Provider: Carolina Kumari MD    Primary Insurance: [facility]  Secondary Insurance:     ASSESSMENT:  Active Health Care Proxies       Guzman Rico [facility] Care Representative - Brother   Primary Phone: [phone] (Home)                           Readmission Root Cause  30 Day Readmission: Yes  During your hospital stay, did someone (provider, nurse, ) explain your care to you in a way you could understand?: Yes  Did you feel medically stable to leave the hospital?: Yes  Were you able to pay for your medication at the pharmacy?: Yes (through  [facility])  Did you have reliable transportation to take you to your appointments?: Yes (through   [facility])  During previous admission, was a post-acute recommendation made?: Yes  What post-acute resources were offered?: [facility]  Patient was readmitted due to: readmitted again with Sepsis 2' UTI- has a chronic suprapubic catheter  Action Plan: IV Abx while awaiting cultures/sensitivities; PT/OT evaluations for SNF Placement-for STR per brother (this has been recommended in the past with the familyl decining this however recently spoke to the  [facility] SW stating it was wished for)    Patient Information  Admitted from:: Home  Mental Status: Alert  During Assessment patient was accompanied by: Not accompanied during assessment  Assessment information provided by:: Patient, Brother, Other - please comment (chart)  Primary Caregiver: Family  Caregiver's Name:: Guzman and Sully Rico (brother/sister) along with HHA's through  [facility]  Support Systems: Family members, Home care staff, Other (Comment) ( [facility])  County [place]idence: [place]  What city do you live in?: [place]  Home entry access options. Select all that apply.: Stairs  Number of steps to enter home.: 3  Do the steps have railings?: No  Living Arrangements: Lives w/ Family members  Is patient a ?: No    Activities of Daily Living Prior to Admission  Functional Status: Assistance  Completes ADLs independently?: No  Level of ADL dependence: Assistance ( [facility] HHA's assist in sponge bathing- pt able to do areas involving UE's however needs asst with back/LE's)  Ambulates independently?:  (Nonambulatory.  Previously pt states he stays in bed as family fearful of him falling however during assessment the brother states he gets up into the w/c with standby assist recently neededing more hands on asst then self propels w/c)  Does patient use assisted devices?: Yes  Assisted Devices (DME) used: Wheelchair, Hospital Bed, Bedside Commode, Other (Comment) (brother states also with bed pan)  Does patient currently own DME?: Yes  What DME does the patient  currently own?: Wheelchair, Bedside Commode, Hospital Bed, Walker, Quad Cane  Does patient have a history of Outpatient Therapy (PT/OT)?: Yes (with [facility]- is brought to center 2x/wk via [facility] per brother)  Does the patient have a history of Short-Term Rehab?: Yes  Does patient have a history of HHC?: Yes  Does patient currently have HHC?: No ([facility] provides in home assistance)         Patient Information Continued  Income Source: SSI/SSD  Does patient have prescription coverage?: Yes (through [facility]- all medications to be prescribed by them on d/c- AVS to be sent via fax [phone])  Does patient have a history of substance abuse?: No  Does patient have a history of Mental Health Diagnosis?: No         Means of Transportation  Means of Transport to Appts:: Other (Comment) ([facility])          DISCHARGE DETAILS:    Discharge planning discussed with:: Brother/ [facility]  Freedom of Choice: Yes  Comments - Freedom of Choice: Family has spoken with [facility] PTA and requested STR from the hospital for strengthening- per brother pt has become weaker as of late and needs more assistance- Agreeable to [facility]ette Gonzalez- discussed need for [place] Clearance being pt w/ MS since 1960's- verbal understanding/agreeable to same  CM contacted family/caregiver?: Yes  Were Treatment Team discharge recommendations reviewed with patient/caregiver?: Yes  Did patient/caregiver verbalize understanding of patient care needs?: Yes       Contacts  Patient Contacts: Guzman Rico  Relationship to Patient:: Family  Contact Method: Phone  Phone Number: [phone]  Reason/Outcome: Continuity of Care, Emergency Contact, Referral, Discharge Planning    Requested Home Health Care         Is the patient interested in HHC at discharge?: No    DME Referral Provided  Referral made for DME?: No    Other Referral/Resources/Interventions Provided:  Interventions: Short Term Rehab  Government Services:: [employer] (Option paperwork sent to  [facility] for SNF clearance)    Would you like to participate in our [facility] service program?  : No - Declined    Treatment Team Recommendation: Short Term Rehab     Transport at Discharge : Misti flores

## 2025-02-13 NOTE — ASSESSMENT & PLAN NOTE
Hx MS, neurogenic bladder  Continue PTA baclofen and gabapentin  Managed by [facility], family wishes for patient to go to snf on discharge  Case management following

## 2025-02-13 NOTE — PLAN OF CARE
Problem: Potential for Falls  Goal: Patient will remain free of falls  Description: INTERVENTIONS:  - Educate patient/family on patient safety including physical limitations  - Instruct patient to call for assistance with activity   - Consult OT/PT to assist with strengthening/mobility   - Keep Call bell within reach  - Keep bed low and locked with side rails adjusted as appropriate  - Keep care items and personal belongings within reach  - Initiate and maintain comfort rounds  - Make Fall Risk Sign visible to staff  - Offer Toileting every *** Hours, in advance of need  - Initiate/Maintain ***alarm  - Obtain necessary fall risk management equipment: ***  - Apply yellow socks and bracelet for high fall risk patients  - Consider moving patient to room near nurses station  Outcome: Progressing     Problem: PAIN - ADULT  Goal: Verbalizes/displays adequate comfort level or baseline comfort level  Description: Interventions:  - Encourage patient to monitor pain and request assistance  - Assess pain using appropriate pain scale  - Administer analgesics based on type and severity of pain and evaluate response  - Implement non-pharmacological measures as appropriate and evaluate response  - Consider cultural and social influences on pain and pain management  - Notify physician/advanced practitioner if interventions unsuccessful or patient reports new pain  Outcome: Progressing     Problem: INFECTION - ADULT  Goal: Absence or prevention of progression during hospitalization  Description: INTERVENTIONS:  - Assess and monitor for signs and symptoms of infection  - Monitor lab/diagnostic results  - Monitor all insertion sites, i.e. indwelling lines, tubes, and drains  - Monitor endotracheal if appropriate and nasal secretions for changes in amount and color  - [place] appropriate cooling/warming therapies per order  - Administer medications as ordered  - Instruct and encourage patient and family to use good hand hygiene  technique  - Identify and instruct in appropriate isolation precautions for identified infection/condition  Outcome: Progressing     Problem: DISCHARGE PLANNING  Goal: Discharge to home or other facility with appropriate resources  Description: INTERVENTIONS:  - Identify barriers to discharge w/patient and caregiver  - Arrange for needed discharge resources and transportation as appropriate  - Identify discharge learning needs (meds, wound care, etc.)  - Arrange for interpretive services to assist at discharge as needed  - Refer to Case Management Department for coordinating discharge planning if the patient needs post-hospital services based on physician/advanced practitioner order or complex needs related to functional status, cognitive ability, or social support system  Outcome: Progressing     Problem: Knowledge Deficit  Goal: Patient/family/caregiver demonstrates understanding of disease process, treatment plan, medications, and discharge instructions  Description: Complete learning assessment and assess knowledge base.  Interventions:  - Provide teaching at level of understanding  - Provide teaching via preferred learning methods  Outcome: Progressing     Problem: GASTROINTESTINAL - ADULT  Goal: Minimal or absence of nausea and/or vomiting  Description: INTERVENTIONS:  - Administer IV fluids if ordered to ensure adequate hydration  - Maintain NPO status until nausea and vomiting are resolved  - Nasogastric tube if ordered  - Administer ordered antiemetic medications as needed  - Provide nonpharmacologic comfort measures as appropriate  - Advance diet as tolerated, if ordered  - Consider nutrition services referral to assist patient with adequate nutrition and appropriate food choices  Outcome: Progressing  Goal: Maintains or returns to baseline bowel function  Description: INTERVENTIONS:  - Assess bowel function  - Encourage oral fluids to ensure adequate hydration  - Administer IV fluids if ordered to ensure  adequate hydration  - Administer ordered medications as needed  - Encourage mobilization and activity  - Consider nutritional services referral to assist patient with adequate nutrition and appropriate food choices  Outcome: Progressing  Goal: Maintains adequate nutritional intake  Description: INTERVENTIONS:  - Monitor percentage of each meal consumed  - Identify factors contributing to decreased intake, treat as appropriate  - Assist with meals as needed  - Monitor I&O, weight, and lab values if indicated  - Obtain nutrition services referral as needed  Outcome: Progressing     Problem: GENITOURINARY - ADULT  Goal: Maintains or returns to baseline urinary function  Description: INTERVENTIONS:  - Assess urinary function  - Encourage oral fluids to ensure adequate hydration if ordered  - Administer IV fluids as ordered to ensure adequate hydration  - Administer ordered medications as needed  - Offer frequent toileting  - Follow urinary retention protocol if ordered  Outcome: Progressing  Goal: Absence of urinary retention  Description: INTERVENTIONS:  - Assess patient’s ability to void and empty bladder  - Monitor I/O  - Bladder scan as needed  - Discuss with physician/AP medications to alleviate retention as needed  - Discuss catheterization for long term situations as appropriate  Outcome: Progressing  Goal: Urinary catheter remains patent  Description: INTERVENTIONS:  - Assess patency of urinary catheter  - If patient has a chronic dela cruz, consider changing catheter if non-functioning  - Follow guidelines for intermittent irrigation of non-functioning urinary catheter  Outcome: Progressing     Problem: METABOLIC, FLUID AND ELECTROLYTES - ADULT  Goal: Electrolytes maintained within normal limits  Description: INTERVENTIONS:  - Monitor labs and assess patient for signs and symptoms of electrolyte imbalances  - Administer electrolyte replacement as ordered  - Monitor response to electrolyte replacements, including  repeat lab results as appropriate  - Instruct patient on fluid and nutrition as appropriate  Outcome: Progressing  Goal: Fluid balance maintained  Description: INTERVENTIONS:  - Monitor labs   - Monitor I/O and WT  - Instruct patient on fluid and nutrition as appropriate  - Assess for signs & symptoms of volume excess or deficit  Outcome: Progressing  Goal: Glucose maintained within target range  Description: INTERVENTIONS:  - Monitor Blood Glucose as ordered  - Assess for signs and symptoms of hyperglycemia and hypoglycemia  - Administer ordered medications to maintain glucose within target range  - Assess nutritional intake and initiate nutrition service referral as needed  Outcome: Progressing     Problem: Prexisting or High Potential for Compromised Skin Integrity  Goal: Skin integrity is maintained or improved  Description: INTERVENTIONS:  - Identify patients at risk for skin breakdown  - Assess and monitor skin integrity  - Assess and monitor nutrition and hydration status  - Monitor labs   - Assess for incontinence   - Turn and reposition patient  - Assist with mobility/ambulation  - Relieve pressure over bony prominences  - Avoid friction and shearing  - Provide appropriate hygiene as needed including keeping skin clean and dry  - Evaluate need for skin moisturizer/barrier cream  - Collaborate with interdisciplinary team   - Patient/family teaching  - Consider wound care consult   Outcome: Progressing

## 2025-02-14 PROBLEM — R10.9 ABDOMINAL PAIN: Status: RESOLVED | Noted: 2025-02-11 | Resolved: 2025-02-14

## 2025-02-14 LAB
ANION GAP SERPL CALCULATED.3IONS-SCNC: 8 MMOL/L (ref 4–13)
BUN SERPL-MCNC: 12 MG/DL (ref 5–25)
CALCIUM SERPL-MCNC: 9.4 MG/DL (ref 8.4–10.2)
CHLORIDE SERPL-SCNC: 105 MMOL/L (ref 96–108)
CO2 SERPL-SCNC: 25 MMOL/L (ref 21–32)
CREAT SERPL-MCNC: 0.83 MG/DL (ref 0.6–1.3)
ERYTHROCYTE [DISTWIDTH] IN BLOOD BY AUTOMATED COUNT: 16 % (ref 11.6–15.1)
GFR SERPL CREATININE-BSD FRML MDRD: 86 ML/MIN/1.73SQ M
GLUCOSE SERPL-MCNC: 122 MG/DL (ref 65–140)
GLUCOSE SERPL-MCNC: 139 MG/DL (ref 65–140)
GLUCOSE SERPL-MCNC: 157 MG/DL (ref 65–140)
GLUCOSE SERPL-MCNC: 186 MG/DL (ref 65–140)
GLUCOSE SERPL-MCNC: 298 MG/DL (ref 65–140)
HCT VFR BLD AUTO: 39.7 % (ref 36.5–49.3)
HGB BLD-MCNC: 12.6 G/DL (ref 12–17)
MCH RBC QN AUTO: 27.8 PG (ref 26.8–34.3)
MCHC RBC AUTO-ENTMCNC: 31.7 G/DL (ref 31.4–37.4)
MCV RBC AUTO: 87 FL (ref 82–98)
PLATELET # BLD AUTO: 267 THOUSANDS/UL (ref 149–390)
PMV BLD AUTO: 8.5 FL (ref 8.9–12.7)
POTASSIUM SERPL-SCNC: 3.9 MMOL/L (ref 3.5–5.3)
RBC # BLD AUTO: 4.54 MILLION/UL (ref 3.88–5.62)
SODIUM SERPL-SCNC: 138 MMOL/L (ref 135–147)
WBC # BLD AUTO: 9.53 THOUSAND/UL (ref 4.31–10.16)

## 2025-02-14 PROCEDURE — 82948 REAGENT STRIP/BLOOD GLUCOSE: CPT

## 2025-02-14 PROCEDURE — 80048 BASIC METABOLIC PNL TOTAL CA: CPT | Performed by: INTERNAL MEDICINE

## 2025-02-14 PROCEDURE — 97167 OT EVAL HIGH COMPLEX 60 MIN: CPT

## 2025-02-14 PROCEDURE — 99232 SBSQ HOSP IP/OBS MODERATE 35: CPT | Performed by: INTERNAL MEDICINE

## 2025-02-14 PROCEDURE — 97163 PT EVAL HIGH COMPLEX 45 MIN: CPT

## 2025-02-14 PROCEDURE — 85027 COMPLETE CBC AUTOMATED: CPT | Performed by: INTERNAL MEDICINE

## 2025-02-14 RX ADMIN — INSULIN LISPRO 1 UNITS: 100 INJECTION, SOLUTION INTRAVENOUS; SUBCUTANEOUS at 16:12

## 2025-02-14 RX ADMIN — ACETAMINOPHEN 325MG 650 MG: 325 TABLET ORAL at 08:27

## 2025-02-14 RX ADMIN — MIRTAZAPINE 7.5 MG: 7.5 TABLET, FILM COATED ORAL at 21:13

## 2025-02-14 RX ADMIN — BUDESONIDE AND FORMOTEROL FUMARATE DIHYDRATE 2 PUFF: 160; 4.5 AEROSOL RESPIRATORY (INHALATION) at 16:06

## 2025-02-14 RX ADMIN — BACLOFEN 5 MG: 10 TABLET ORAL at 21:14

## 2025-02-14 RX ADMIN — GABAPENTIN 300 MG: 300 CAPSULE ORAL at 07:53

## 2025-02-14 RX ADMIN — BACLOFEN 5 MG: 10 TABLET ORAL at 16:06

## 2025-02-14 RX ADMIN — PROPRANOLOL HYDROCHLORIDE 60 MG: 60 CAPSULE, EXTENDED RELEASE ORAL at 07:54

## 2025-02-14 RX ADMIN — BACLOFEN 5 MG: 10 TABLET ORAL at 07:53

## 2025-02-14 RX ADMIN — INSULIN LISPRO 1 UNITS: 100 INJECTION, SOLUTION INTRAVENOUS; SUBCUTANEOUS at 12:39

## 2025-02-14 RX ADMIN — Medication 500 MCG: at 07:53

## 2025-02-14 RX ADMIN — GABAPENTIN 600 MG: 300 CAPSULE ORAL at 21:13

## 2025-02-14 RX ADMIN — LATANOPROST 1 DROP: 50 SOLUTION OPHTHALMIC at 21:19

## 2025-02-14 RX ADMIN — GABAPENTIN 300 MG: 300 CAPSULE ORAL at 16:06

## 2025-02-14 RX ADMIN — NYSTATIN: 100000 POWDER TOPICAL at 07:58

## 2025-02-14 RX ADMIN — PANTOPRAZOLE SODIUM 40 MG: 40 TABLET, DELAYED RELEASE ORAL at 05:55

## 2025-02-14 RX ADMIN — INSULIN LISPRO 3 UNITS: 100 INJECTION, SOLUTION INTRAVENOUS; SUBCUTANEOUS at 21:17

## 2025-02-14 RX ADMIN — ENOXAPARIN SODIUM 40 MG: 40 INJECTION SUBCUTANEOUS at 07:53

## 2025-02-14 RX ADMIN — ATORVASTATIN CALCIUM 80 MG: 80 TABLET, FILM COATED ORAL at 16:06

## 2025-02-14 RX ADMIN — AMLODIPINE BESYLATE 10 MG: 10 TABLET ORAL at 07:53

## 2025-02-14 RX ADMIN — SENNOSIDES 17.2 MG: 8.6 TABLET ORAL at 21:13

## 2025-02-14 RX ADMIN — CLOPIDOGREL BISULFATE 75 MG: 75 TABLET ORAL at 07:53

## 2025-02-14 RX ADMIN — BUDESONIDE AND FORMOTEROL FUMARATE DIHYDRATE 2 PUFF: 160; 4.5 AEROSOL RESPIRATORY (INHALATION) at 07:54

## 2025-02-14 RX ADMIN — POLYETHYLENE GLYCOL 3350 17 G: 17 POWDER, FOR SOLUTION ORAL at 07:52

## 2025-02-14 NOTE — PLAN OF CARE
Problem: Potential for Falls  Goal: Patient will remain free of falls  Description: INTERVENTIONS:  - Educate patient/family on patient safety including physical limitations  - Instruct patient to call for assistance with activity   - Consult OT/PT to assist with strengthening/mobility   - Keep Call bell within reach  - Keep bed low and locked with side rails adjusted as appropriate  - Keep care items and personal belongings within reach  - Initiate and maintain comfort rounds  - Make Fall Risk Sign visible to staff  - Offer Toileting every  Hours, in advance of need  - Initiate/Maintain alarm  - Obtain necessary fall risk management equipmen  - Apply yellow socks and bracelet for high fall risk patients  - Consider moving patient to room near nurses station  Outcome: Progressing     Problem: PAIN - ADULT  Goal: Verbalizes/displays adequate comfort level or baseline comfort level  Description: Interventions:  - Encourage patient to monitor pain and request assistance  - Assess pain using appropriate pain scale  - Administer analgesics based on type and severity of pain and evaluate response  - Implement non-pharmacological measures as appropriate and evaluate response  - Consider cultural and social influences on pain and pain management  - Notify physician/advanced practitioner if interventions unsuccessful or patient reports new pain  Outcome: Progressing     Problem: INFECTION - ADULT  Goal: Absence or prevention of progression during hospitalization  Description: INTERVENTIONS:  - Assess and monitor for signs and symptoms of infection  - Monitor lab/diagnostic results  - Monitor all insertion sites, i.e. indwelling lines, tubes, and drains  - Monitor endotracheal if appropriate and nasal secretions for changes in amount and color  - [place] appropriate cooling/warming therapies per order  - Administer medications as ordered  - Instruct and encourage patient and family to use good hand hygiene technique  -  Identify and instruct in appropriate isolation precautions for identified infection/condition  Outcome: Progressing     Problem: DISCHARGE PLANNING  Goal: Discharge to home or other facility with appropriate resources  Description: INTERVENTIONS:  - Identify barriers to discharge w/patient and caregiver  - Arrange for needed discharge resources and transportation as appropriate  - Identify discharge learning needs (meds, wound care, etc.)  - Arrange for interpretive services to assist at discharge as needed  - Refer to Case Management Department for coordinating discharge planning if the patient needs post-hospital services based on physician/advanced practitioner order or complex needs related to functional status, cognitive ability, or social support system  Outcome: Progressing     Problem: Knowledge Deficit  Goal: Patient/family/caregiver demonstrates understanding of disease process, treatment plan, medications, and discharge instructions  Description: Complete learning assessment and assess knowledge base.  Interventions:  - Provide teaching at level of understanding  - Provide teaching via preferred learning methods  Outcome: Progressing     Problem: GASTROINTESTINAL - ADULT  Goal: Minimal or absence of nausea and/or vomiting  Description: INTERVENTIONS:  - Administer IV fluids if ordered to ensure adequate hydration  - Maintain NPO status until nausea and vomiting are resolved  - Nasogastric tube if ordered  - Administer ordered antiemetic medications as needed  - Provide nonpharmacologic comfort measures as appropriate  - Advance diet as tolerated, if ordered  - Consider nutrition services referral to assist patient with adequate nutrition and appropriate food choices  Outcome: Progressing  Goal: Maintains or returns to baseline bowel function  Description: INTERVENTIONS:  - Assess bowel function  - Encourage oral fluids to ensure adequate hydration  - Administer IV fluids if ordered to ensure adequate  hydration  - Administer ordered medications as needed  - Encourage mobilization and activity  - Consider nutritional services referral to assist patient with adequate nutrition and appropriate food choices  Outcome: Progressing  Goal: Maintains adequate nutritional intake  Description: INTERVENTIONS:  - Monitor percentage of each meal consumed  - Identify factors contributing to decreased intake, treat as appropriate  - Assist with meals as needed  - Monitor I&O, weight, and lab values if indicated  - Obtain nutrition services referral as needed  Outcome: Progressing     Problem: GENITOURINARY - ADULT  Goal: Maintains or returns to baseline urinary function  Description: INTERVENTIONS:  - Assess urinary function  - Encourage oral fluids to ensure adequate hydration if ordered  - Administer IV fluids as ordered to ensure adequate hydration  - Administer ordered medications as needed  - Offer frequent toileting  - Follow urinary retention protocol if ordered  Outcome: Progressing  Goal: Absence of urinary retention  Description: INTERVENTIONS:  - Assess patient’s ability to void and empty bladder  - Monitor I/O  - Bladder scan as needed  - Discuss with physician/AP medications to alleviate retention as needed  - Discuss catheterization for long term situations as appropriate  Outcome: Progressing  Goal: Urinary catheter remains patent  Description: INTERVENTIONS:  - Assess patency of urinary catheter  - If patient has a chronic dela cruz, consider changing catheter if non-functioning  - Follow guidelines for intermittent irrigation of non-functioning urinary catheter  Outcome: Progressing     Problem: METABOLIC, FLUID AND ELECTROLYTES - ADULT  Goal: Electrolytes maintained within normal limits  Description: INTERVENTIONS:  - Monitor labs and assess patient for signs and symptoms of electrolyte imbalances  - Administer electrolyte replacement as ordered  - Monitor response to electrolyte replacements, including repeat lab  results as appropriate  - Instruct patient on fluid and nutrition as appropriate  Outcome: Progressing  Goal: Fluid balance maintained  Description: INTERVENTIONS:  - Monitor labs   - Monitor I/O and WT  - Instruct patient on fluid and nutrition as appropriate  - Assess for signs & symptoms of volume excess or deficit  Outcome: Progressing  Goal: Glucose maintained within target range  Description: INTERVENTIONS:  - Monitor Blood Glucose as ordered  - Assess for signs and symptoms of hyperglycemia and hypoglycemia  - Administer ordered medications to maintain glucose within target range  - Assess nutritional intake and initiate nutrition service referral as needed  Outcome: Progressing     Problem: Prexisting or High Potential for Compromised Skin Integrity  Goal: Skin integrity is maintained or improved  Description: INTERVENTIONS:  - Identify patients at risk for skin breakdown  - Assess and monitor skin integrity  - Assess and monitor nutrition and hydration status  - Monitor labs   - Assess for incontinence   - Turn and reposition patient  - Assist with mobility/ambulation  - Relieve pressure over bony prominences  - Avoid friction and shearing  - Provide appropriate hygiene as needed including keeping skin clean and dry  - Evaluate need for skin moisturizer/barrier cream  - Collaborate with interdisciplinary team   - Patient/family teaching  - Consider wound care consult   Outcome: Progressing     Problem: Nutrition/Hydration-ADULT  Goal: Nutrient/Hydration intake appropriate for improving, restoring or maintaining nutritional needs  Description: Monitor and assess patient's nutrition/hydration status for malnutrition. Collaborate with interdisciplinary team and initiate plan and interventions as ordered.  Monitor patient's weight and dietary intake as ordered or per policy. Utilize nutrition screening tool and intervene as necessary. Determine patient's food preferences and provide high-protein, high-caloric  foods as appropriate.     INTERVENTIONS:  - Monitor oral intake, urinary output, labs, and treatment plans  - Assess nutrition and hydration status and recommend course of action  - Evaluate amount of meals eaten  - Assist patient with eating if necessary   - Allow adequate time for meals  - Recommend/ encourage appropriate diets, oral nutritional supplements, and vitamin/mineral supplements  - Order, calculate, and assess calorie counts as needed  - Recommend, monitor, and adjust tube feedings and TPN/PPN based on assessed needs  - Assess need for intravenous fluids  - Provide specific nutrition/hydration education as appropriate  - Include patient/family/caregiver in decisions related to nutrition  Outcome: Progressing

## 2025-02-14 NOTE — DISCHARGE INSTR - OTHER ORDERS
Skin Care Plan:   1-Apply Remedy silicone cream to buttocks, sacrum, and heels twice daily and as needed with any incontinence care.  2-Elevate heels off of bed/chair surface--offloading heel boots.  3-Tortoise positioning system or low air-loss mattress.  4-Apply lotion to body daily and as needed.  5-Turn/reposition every 2 hours for pressure re-distribution on skin.   6-Right anterior/medial foot--cleanse with normal saline, pat dry.  Apply silicone bordered foam dressing.  Change dressing every 3 days.  7-Righ 2nd toe--Apply 3m Cavilon no sting skin prep daily.  8-Suprapubic catheter care daily per protocol--cleanse and cover with split guaze.  Ensure catheter is secured.

## 2025-02-14 NOTE — PHYSICAL THERAPY NOTE
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)    History of CVA (cerebrovascular accident)    Bladder stones    Bladder neck contracture    Pancreatic mass    Pancreatic lesion    Severe sepsis (HCC)    Excessive daytime sleepiness    Shortness of breath    Hyponatremia    Chronic diastolic congestive heart failure (HCC)    Constipation    Accidental fall from chair    Fall    Weakness    Stercoral colitis    UTI (urinary tract infection)    Generalized weakness    Acute encephalopathy    GERSON on CPAP    Fall    Primary hypertension    Type 2 diabetes mellitus without complication, without long-term current use of insulin (HCC)    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HCC)    Multiple sclerosis (HCC)    Urinary retention    Neck pain    Vitamin B deficiency    Chest pain    Symptoms of upper respiratory infection (URI)    Blurred vision, bilateral    Dizziness    Pruritus    Asymptomatic bacteriuria    Pain of left hip    Left leg pain    Bladder wall thickening    History of stroke    Urinary obstruction    Mild protein-calorie malnutrition (HCC)       Past Medical History  Past Medical History:   Diagnosis Date    Acute laryngitis     Acute nonsuppurative otitis media, unspecified laterality     Arm weakness     Arthritis     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HCC)     Bladder infection     Bronchitis     Constipation     Cough     Diabetes (HCC)     Diabetes  mellitus (HCC)     Dizziness     Dysfunction of eustachian tube     Erectile dysfunction of non-organic origin     Fatigue     Glaucoma     Hiatal hernia     Hypertension     Imbalance     Leg muscle spasm     Leukocytosis 04/01/2024    MS (multiple sclerosis) (HCC)     Multiple sclerosis (HCC)     Nephrolithiasis     Neurogenic bladder     No natural teeth     Sinus pain     Spinal stenosis     Strain of thoracic region     Stroke (HCC)     Suprapubic catheter (HCC)        Past Surgical History  Past Surgical History:   Procedure Laterality Date    APPENDECTOMY      BRAIN SURGERY      Coil placed in aneurysm    CEREBRAL ANEURYSM REPAIR      CYSTOSCOPY      CYSTOSCOPY      CYSTOSCOPY  06/11/2018    CYSTOSCOPY  01/15/2021    EYE SURGERY      transscleral cyclophotocoagulation noncontact YAG laser    IR SUPRAPUBIC CATHETER CHECK/CHANGE/REINSERTION/UPSIZE  3/28/2024    MYRINGOTOMY      with ventilation tube insertion    KY LITHOLAPAXY SMPL/SM <2.5 CM N/A 5/7/2019    Procedure: CYSTOSCOPY, holmium laser litholapaxy of bladder stones, EXCHANGE OF SP TUBE;  Surgeon: Javy Jeffries MD;  Location: [facility];  Service: Urology    SUPRAPUBIC CATHETER INSERTION          02/14/25 0838   PT Last Visit   PT Visit Date 02/14/25   Note Type   Note type Evaluation   Pain Assessment   Pain Assessment Tool 0-10   Pain Score 10 - Worst Possible Pain   Pain Location/Orientation Location: Head   Pain Onset/Description Descriptor: Headache   Restrictions/Precautions   Weight Bearing Precautions Per Order No   Other Precautions Contact/isolation;Cognitive;Chair Alarm;Bed Alarm;Fall Risk;Pain   Home Living   Type of Home House   Home Layout One level;Stairs to enter with rails;Ramped entrance   Bathroom Shower/Tub Walk-in shower  (sponge bathes)   Bathroom Toilet Raised   Bathroom Equipment Grab bars in shower;Shower chair;Commode   Home Equipment Quad cane;Walker;Wheelchair-manual;Hospital bed   Additional Comments Pt is a poor  historian. SH obtained primarily from EMR.   Prior Function   Level of [place] Needs assistance with ADLs;Needs assistance with functional mobility;Needs assistance with IADLS  (pt reports being primarily in bed, toilets in the bed and siblings help to clean him up; pt reports he can't remember the last time he was out of bed; CM note states that pt goes to [facility] 2x/wk via W/C van)   Lives With   (brother + sister)   Receives Help From Family  (+ [facility])   IADLs Family/Friend/Other provides meals;Family/Friend/Other provides medication management   General   Family/Caregiver Present No   Cognition   Overall Cognitive Status Impaired   Arousal/Participation Alert   Orientation Level Oriented to person;Oriented to place;Disoriented to time;Disoriented to situation   Memory Decreased recall of recent events;Decreased recall of precautions;Decreased short term memory   Comments pleasant + cooperative   Subjective   Subjective Agreeable to mobilize.   RLE Assessment   RLE Assessment   (0/5 anterior tibialis, 0/5 quad,1/5 hamstring)   LLE Assessment   LLE Assessment   (2-/5)   Coordination   Movements are Fluid and Coordinated 0   Bed Mobility   Rolling R 2  Maximal assistance   Additional items Assist x 2;Increased time required;Verbal cues;LE management   Rolling L 2  Maximal assistance   Additional items Assist x 2;Bedrails;Increased time required;Verbal cues;LE management   Supine to Sit 2  Maximal assistance   Additional items Assist x 2;Increased time required;LE management;Verbal cues;HOB elevated   Sit to Supine 2  Maximal assistance   Additional items Assist x 2;HOB elevated;Increased time required;Verbal cues;LE management   Additional Comments Pt greeted in supine, frog legged position (B hips externally rotated w/ knees + hips flexed).   Transfers   Sit to Stand Unable to assess   Additional Comments MaxA for sitting balance w/ posterior lean   Balance   Static Sitting Poor -   Dynamic Sitting    (zero)   Endurance Deficit   Endurance Deficit Yes   Endurance Deficit Description deconditioning, fatigue   Activity Tolerance   Activity Tolerance Patient limited by fatigue   Medical Staff Made Aware OT Kary   Nurse Made Aware yes - cleared + updated   Assessment   Prognosis Guarded   Problem List Decreased strength;Decreased range of motion;Decreased endurance;Impaired balance;Decreased mobility;Decreased coordination;Decreased cognition;Impaired judgement;Decreased safety awareness;Impaired tone;Pain   Assessment Pt is 74 y.o. male seen for a PT evaluation s/p admit to [facility] on 2/10/2025. Pt presenting w/ principal dx of severe sepsis (suspected 2/2 UTI per most recent Hospitalist note). Please see above for other active problem list / PMH.     PT now consulted to assess functional mobility and needs for safe d/c planning. Prior to admission, pt reportedly primarily stayed in bed, questionably went to [facility] 2x/wk via W/C van, lives w/ sister + brother.       Currently pt requires MaxAx2 for bed skills. Pt presents w/ overall mobility deficits 2* to: decreased LE strength; decreased LE ROM; decreased endurance; impaired balance; fatigue; bed/chair alarms; multiple lines. These impairments place pt at risk for falls.     Pt will continue to benefit from skilled PT interventions to address stated impairments; to maximize functional potential; for ongoing pt/ family training; and DME needs. PT is currently recommending level 2 resources.   Goals   Patient Goals none expressed   STG Expiration Date 02/28/25   Short Term Goal #1 In 14 days pt will complete: 1) Bed mobility skills with MinAx1 to facilitate safe return to previous living environment. 2) PT to see to assess transfers as clinically appropriate. 3) Improve LE strength grades by 1 to increase independence w/ all functional mobility, transfers and gait. 4) Pt will sit EOB x25 min w/ S to prepare for t/f. 5) PT for ongoing pt and  family education; DME needs and D/C planning to promote highest level of function in least restrictive environment.   Plan   Treatment/Interventions LE strengthening/ROM;Therapeutic exercise;Endurance training;Patient/family training;Equipment eval/education;Bed mobility;Compensatory technique education;Spoke to nursing;OT   PT Frequency 1-2x/wk   Discharge Recommendation   Rehab Resource Intensity Level, PT II (Moderate Resource Intensity)   AM-PAC Basic Mobility Inpatient   Turning in Flat Bed Without Bedrails 1   Lying on Back to Sitting on Edge of Flat Bed Without Bedrails 1   Moving Bed to Chair 1   Standing Up From Chair Using Arms 1   Walk in Room 1   Climb 3-5 Stairs With Railing 1   Basic Mobility Inpatient Raw Score 6   [facility] Highest Level Of Mobility   -HLM Goal 2: Bed activities/Dependent transfer   -HLM Achieved 3: Sit at edge of bed   End of Consult   Patient Position at End of Consult Bed/Chair alarm activated;All needs within reach;Supine     Tania Diaz, PT, DPT

## 2025-02-14 NOTE — PROGRESS NOTES
Progress Note - Hospitalist   Name: Mathew Rico 74 y.o. male I MRN: [id number]  Unit/Bed#: [place] [address] -01 I Date of Admission: 2/10/2025   Date of Service: 2/14/2025 I Hospital Day: 4    Assessment & Plan  Severe sepsis (HCC)  Meeting criteria with tachycardia, tachypnea, leukocytosis, elevated lactic acid  Suspected secondary to UTI  History of MDRO  Urine cultures with enteric coccus sensitive to ertapenem  Completed 3-day course of ertapenem  Patient remains clinically well, afebrile with resolved leukocytosis  Medically stable for discharge to SNF  History of CVA (cerebrovascular accident)  Continue PTA Plavix and statin  UTI (urinary tract infection)  Hx MDRO Enterobacter cloacea   Suprapubic catheter exchanged in the emergency department  Management as above  Type 2 diabetes mellitus without complication, without long-term current use of insulin (HCC)  Continue sliding scale insulin  Resume oral regimen on discharge  Multiple sclerosis (HCC)  Hx MS, neurogenic bladder  Continue PTA baclofen and gabapentin  [facility] by [facility], family wishes for patient to go to snf on discharge  Case management following   Urinary retention  Neurogenic bladder  SPT in place  Outpatient urology follow-up  Abdominal pain (Resolved: 2/14/2025)  Presented with severe suprapubic abdominal pain x 1 day  Resolution following SPT exchange and initiation of antibiotics    VTE Pharmacologic Prophylaxis: lovenox     Patient Centered Rounds:  Patient care rounds were performed with nursing    Education and Discussions with Family / Patient: Patient     Time Spent for Care: I have spent a total time of 36 minutes on 02/14/25 in caring for this patient including Diagnostic results, Impressions, Counseling / Coordination of care, Documenting in the medical record, Reviewing / ordering tests, medicine, procedures  , and Communicating with other healthcare professionals .      Current Length of Stay: 4 day(s)    Current Patient  Status: Inpatient   Certification Statement: The patient will continue to require additional inpatient hospital stay due to awaiting placement     Discharge Plan: Medically stable for discharge     Code Status: Level 1 - Full Code      Subjective:   Patient seen and evaluated at bedside. He feels well.     Objective:     Vitals:   Temp (24hrs), Av.6 °F (36.4 °C), Min:97.4 °F (36.3 °C), Max:97.9 °F (36.6 °C)    Temp:  [97.4 °F (36.3 °C)-97.9 °F (36.6 °C)] 97.9 °F (36.6 °C)  HR:  [71-80] 79  Resp:  [18] 18  BP: (118-137)/(51-65) 128/61  SpO2:  [94 %-98 %] 98 %  Body mass index is 24.26 kg/m².     Input and Output Summary (last 24 hours):       Intake/Output Summary (Last 24 hours) at 2025 0914  Last data filed at 2025 1538  Gross per 24 hour   Intake 840 ml   Output --   Net 840 ml       Physical Exam:     Physical Exam  Vitals reviewed.   Constitutional:       General: He is not in acute distress.     Appearance: He is well-developed. He is not ill-appearing, toxic-appearing or diaphoretic.      Comments: Chronically debilitated but well-appearing   HENT:      Head: Normocephalic and atraumatic.      Mouth/Throat:      Mouth: Mucous membranes are moist.      Pharynx: No oropharyngeal exudate.   Eyes:      General: No scleral icterus.     Extraocular Movements: Extraocular movements intact.   Cardiovascular:      Rate and Rhythm: Normal rate and regular rhythm.      Heart sounds: Normal heart sounds.   Pulmonary:      Effort: Pulmonary effort is normal. No respiratory distress.      Breath sounds: Normal breath sounds. No wheezing or rales.   Abdominal:      General: There is no distension.      Palpations: Abdomen is soft.      Tenderness: There is no abdominal tenderness. There is no guarding or rebound.   Musculoskeletal:         General: No swelling, tenderness or deformity.      Cervical back: Normal range of motion.   Skin:     General: Skin is warm and dry.   Neurological:      Mental Status: He  is alert and oriented to person, place, and time.   Psychiatric:         Mood and Affect: Mood normal.         Behavior: Behavior normal.         Thought Content: Thought content normal.         Judgment: Judgment normal.         Additional Data:     Labs: I have reviewed pertinent results     Results from last 7 days   Lab Units 02/14/25  0646 02/13/25  0456 02/12/25  0508   WBC Thousand/uL 9.53   < > 9.99   HEMOGLOBIN g/dL 12.6   < > 13.1   HEMATOCRIT % 39.7   < > 43.4   PLATELETS Thousands/uL 267   < > 269   SEGS PCT %  --   --  58   LYMPHO PCT %  --   --  29   MONO PCT %  --   --  8   EOS PCT %  --   --  4    < > = values in this interval not displayed.     Results from last 7 days   Lab Units 02/14/25  0646 02/11/25  0516 02/10/25  1846   SODIUM mmol/L 138   < > 139   POTASSIUM mmol/L 3.9   < > 4.2   CHLORIDE mmol/L 105   < > 106   CO2 mmol/L 25   < > 23   BUN mg/dL 12   < > 17   CREATININE mg/dL 0.83   < > 0.84   ANION GAP mmol/L 8   < > 10   CALCIUM mg/dL 9.4   < > 10.2   ALBUMIN g/dL  --   --  4.0   TOTAL BILIRUBIN mg/dL  --   --  0.34   ALK PHOS U/L  --   --  75   ALT U/L  --   --  6*   AST U/L  --   --  11*   GLUCOSE RANDOM mg/dL 122   < > 87    < > = values in this interval not displayed.     Results from last 7 days   Lab Units 02/10/25  1932   INR  0.94     Results from last 7 days   Lab Units 02/14/25  0632 02/13/25  2022 02/13/25  1638 02/13/25  1033 02/13/25  0611 02/12/25  2045 02/12/25  1615 02/12/25  1129 02/12/25  0759 02/11/25  2127 02/11/25  1658 02/11/25  1206   POC GLUCOSE mg/dl 139 188* 143* 103 116 154* 175* 149* 88 123 131 119     Results from last 7 days   Lab Units 02/10/25  1846   HEMOGLOBIN A1C % 6.3*     Results from last 7 days   Lab Units 02/11/25  0404 02/11/25  0027 02/10/25  2214 02/10/25  1954   LACTIC ACID mmol/L 2.1* 2.8* 2.2*  --    PROCALCITONIN ng/ml  --   --   --  <0.05         Imaging: I have reviewed pertinent imaging       Recent Cultures (last 7 days):     Results from  last 7 days   Lab Units 02/10/25  2131 02/10/25  1955 02/10/25  1954   BLOOD CULTURE   --  No Growth at 72 hrs. No Growth at 72 hrs.   URINE CULTURE  >100,000 cfu/ml Enterobacter cloacae*  50,000-59,000 cfu/ml  --   --        Last 24 Hours Medication List:   Current Facility-Administered Medications   Medication Dose Route Frequency Provider Last Rate    acetaminophen  650 mg Oral Q6H PRN Petra S Rodney, CRNP      aluminum-magnesium hydroxide-simethicone  30 mL Oral Q4H PRN Steve Shook DO      amLODIPine  10 mg Oral Daily Petra S Rodney, CRNP      atorvastatin  80 mg Oral Daily With Dinner Petra S Rodney, CRNP      baclofen  5 mg Oral TID Petra S Rodney, CRNP      budesonide-formoterol  2 puff Inhalation BID Petra S Rodney, CRNP      clopidogrel  75 mg Oral Daily Petra S Rodney, CRNP      cyanocobalamin  500 mcg Oral Daily Petra S Rodney, CRNP      enoxaparin  40 mg Subcutaneous Daily Petra S Rodney, CRNP      gabapentin  300 mg Oral BID Fernando Pulido, CRNP      gabapentin  600 mg Oral HS Petra S Rodney, CRNP      insulin lispro  1-5 Units Subcutaneous TID AC Petra S Rodney, CRNP      insulin lispro  1-5 Units Subcutaneous HS Petra S Rodney, CRNP      latanoprost  1 drop Both Eyes HS Petra S Rodney, CRNP      melatonin  3 mg Oral HS PRN Petra S Rodney, CRNP      mirtazapine  7.5 mg Oral HS Petra S Rodney, CRNP      nystatin   Topical BID Steve Shook DO      ondansetron  4 mg Intravenous Q4H PRN Steve Shook DO      oxyCODONE  5 mg Oral Q6H PRN Petra S Rodney, CRNP      pantoprazole  40 mg Oral Early Morning Petra S Rodney, CRNP      polyethylene glycol  17 g Oral Daily Petra S Rodney, CRNP      propranolol  60 mg Oral Daily Steve Shook DO      senna  2 tablet Oral HS Petra S Rodney, CRNP          Today, Patient Was Seen By: Steve Shook DO    ** Please Note: Dictation voice to text software may have been used in the creation of this document. **

## 2025-02-14 NOTE — OCCUPATIONAL THERAPY NOTE
Occupational Therapy Evaluation     Patient Name: Mathew Rico  Today's Date: 2/14/2025  Problem List  Principal Problem:    Severe sepsis (HCC)  Active Problems:    History of CVA (cerebrovascular accident)    UTI (urinary tract infection)    Type 2 diabetes mellitus without complication, without long-term current use of insulin (HCC)    Multiple sclerosis (HCC)    Urinary retention    Past Medical History  Past Medical History:   Diagnosis Date    Acute laryngitis     Acute nonsuppurative otitis media, unspecified laterality     Arm weakness     Arthritis     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HCC)     Bladder infection     Bronchitis     Constipation     Cough     Diabetes (HCC)     Diabetes mellitus (HCC)     Dizziness     Dysfunction of eustachian tube     Erectile dysfunction of non-organic origin     Fatigue     Glaucoma     Hiatal hernia     Hypertension     Imbalance     Leg muscle spasm     Leukocytosis 04/01/2024    MS (multiple sclerosis) (HCC)     Multiple sclerosis (HCC)     Nephrolithiasis     Neurogenic bladder     No natural teeth     Sinus pain     Spinal stenosis     Strain of thoracic region     Stroke (HCC)     Suprapubic catheter (HCC)      Past Surgical History  Past Surgical History:   Procedure Laterality Date    APPENDECTOMY      BRAIN SURGERY      Coil placed in aneurysm    CEREBRAL ANEURYSM REPAIR      CYSTOSCOPY      CYSTOSCOPY      CYSTOSCOPY  06/11/2018    CYSTOSCOPY  01/15/2021    EYE SURGERY      transscleral cyclophotocoagulation noncontact YAG laser    IR SUPRAPUBIC CATHETER CHECK/CHANGE/REINSERTION/UPSIZE  3/28/2024    MYRINGOTOMY      with ventilation tube insertion    SD LITHOLAPAXY SMPL/SM <2.5 CM N/A 5/7/2019    Procedure: CYSTOSCOPY, holmium laser litholapaxy of bladder stones, EXCHANGE OF SP TUBE;  Surgeon: Javy Jeffries MD;  Location: [facility];  Service: Urology    SUPRAPUBIC CATHETER INSERTION             02/14/25 0839   OT Last Visit   OT Visit Date 02/14/25   Note Type    Note type Evaluation   Pain Assessment   Pain Assessment Tool 0-10   Pain Score 10 - Worst Possible Pain   Pain Location/Orientation Location: Head   Pain Onset/Description Descriptor: Headache   Patient's Stated Pain Goal No pain   Hospital Pain Intervention(s) Repositioned;Ambulation/increased activity;Emotional support;Rest   Multiple Pain Sites No   Restrictions/Precautions   Weight Bearing Precautions Per Order No   Other Precautions Contact/isolation;Cognitive;Chair Alarm;Bed Alarm;Fall Risk;Pain   Home Living   Type of Home House   Home Layout One level;Stairs to enter with rails;Ramped entrance   Bathroom Shower/Tub Walk-in shower  (sponge bathes)   Bathroom Toilet Raised   Bathroom Equipment Grab bars in shower;Shower chair;Commode   Bathroom Accessibility Accessible   Home Equipment Walker;Quad cane;Hospital bed;Wheelchair-manual   Additional Comments Pt is poor historian, info on home setup and PLOF obtained via chart. Per chart: Pt lives with brother and sister in a one level house with 3 DAI vs ramped entrance. Pt has HHAs through [facility] and attends [facility] 2x/wk.   Prior Function   Level of [place] Needs assistance with ADLs;Needs assistance with IADLS   Lives With Family  (brother, sister)   Receives Help From Family;Other (Comment)  (Pt has HHAs through [facility] and attends [facility] 2x/wk.)   IADLs Family/Friend/Other provides transportation;Family/Friend/Other provides meals;Family/Friend/Other provides medication management   Falls in the last 6 months 0   Vocational Retired   Comments At baseline, pt requires assist w/ ADLs and IADLs (able to perform UB ADLs per CM note). Per CM note: Pt non-ambulatory, able to get in/out of W/C with standby assistance and self-propel W/C. (-) . Pt denies falls PTA.   Lifestyle   Autonomy At baseline, pt requires assist w/ ADLs and IADLs (able to perform UB ADLs per CM note). Per CM note: Pt non-ambulatory, able to get in/out of W/C  with standby assistance and self-propel W/C. (-) . Pt denies falls PTA.   Reciprocal Relationships Brother, sister   Service to Others Retired   ADL   Where Assessed Edge of bed   Eating Assistance 4  Minimal Assistance   Grooming Assistance 4  Minimal Assistance   UB Bathing Assistance 4  Minimal Assistance   LB Bathing Assistance 2  Maximal Assistance   UB Dressing Assistance 4  Minimal Assistance   LB Dressing Assistance 1  Total Assistance   Toileting Assistance  1  Total Assistance   Additional Comments +incontinent of BM during session   Bed Mobility   Rolling R 2  Maximal assistance   Additional items Assist x 2;Increased time required;Verbal cues;LE management   Rolling L 2  Maximal assistance   Additional items Assist x 2;Increased time required;Verbal cues;LE management   Supine to Sit 2  Maximal assistance   Additional items Assist x 2;HOB elevated;Bedrails;Increased time required;Verbal cues;LE management  (trunk support)   Sit to Supine 2  Maximal assistance   Additional items Assist x 2;Increased time required;Verbal cues;LE management  (trunk support)   Additional Comments Pt lying supine with bed alarm activated at end of session. Call bell and phone within reach. All needs met and pt reports no further questions for OT at this time.   Transfers   Sit to Stand Unable to assess  (2* decreased seated balance/tolerance)   Functional Mobility   Additional Comments Recommend asim lift for OOB w/ nursing   Balance   Static Sitting Poor -   Dynamic Sitting   (Zero)   Activity Tolerance   Activity Tolerance Patient limited by fatigue;Treatment limited secondary to medical complications (Comment);Other (Comment)  (weakness)   Medical Staff Made Aware Tania PT   Nurse Made Aware yes; GYPSY R[place]ie   RUE Assessment   RUE Assessment WFL  (3+/5 throughout)   LUE Assessment   LUE Assessment WFL  (3+/5 throughout)   Hand Function   Gross Motor Coordination Functional   Fine Motor Coordination Functional    Sensation   Light Touch Partial deficits in the RLE;Partial deficits in the LLE   Proprioception   Proprioception No apparent deficits   Vision - Complex Assessment   Acuity Able to read clock/calendar on wall without difficulty   Psychosocial   Psychosocial (WDL) WDL   Perception   Inattention/Neglect Appears intact   Cognition   Overall Cognitive Status Impaired   Arousal/Participation Alert;Cooperative   Attention Attends with cues to redirect   Orientation Level Oriented to person;Oriented to place;Disoriented to time;Disoriented to situation   Memory Decreased recall of precautions;Decreased recall of recent events;Decreased short term memory   Following Commands Follows one step commands with increased time or repetition   Assessment   Limitation Decreased ADL status;Decreased UE strength;Decreased Safe judgement during ADL;Decreased cognition;Decreased endurance;Decreased self-care trans;Decreased high-level ADLs;Decreased sensation   Prognosis Fair   Assessment Pt is a 74 y.o. male seen for OT evaluation s/p adm to [facility] on 2/10/2025 w/ Severe sepsis. Comorbidities affecting pt’s functional performance include a significant PMH of MS, neurogenic bladder, chronic SPT, NIDDM, HTN, HLD. Pt with active OT orders and activity orders for Activity as tolerated. Pt is poor historian, info on home setup and PLOF obtained via chart. Per chart: Pt lives with brother and sister in a one level house with 3 DAI vs ramped entrance. Pt has HHAs through [employer] and attends [employer] 2x/wk. At baseline, pt requires assist w/ ADLs and IADLs (able to perform UB ADLs per CM note). Per CM note: Pt non-ambulatory, able to get in/out of W/C with standby assistance and self-propel W/C. (-) . Pt denies falls PTA. Upon evaluation, pt currently requires Min A for UB ADLs, Max-Total A for LB ADLs, Total A for toileting, and Max A of 2 for bed mobility (OOB not assessed 2* decreased seated balance/tolerance)  2* the following deficits impacting occupational performance: weakness, decreased strength , decreased balance, decreased activity tolerance, limited functional reach, impaired sensation, impaired memory, impaired problem solving, decreased safety awareness, and decreased postural control. These impairments, as well at pt’s personal factors of: DAI home environment, difficulty performing ADLs, difficulty performing transfers/mobility, limited insight into deficits, decreased initiation and engagement, fall risk , functional decline , and multiple admissions  limit pt’s ability to safely engage in all baseline areas of occupation. Pt to continue to benefit from continued acute OT services during hospital stay to address defined deficits and to maximize level of functional independence in the following Occupational Performance areas: eating, grooming, bathing/shower, toilet hygiene, dressing, health maintenance, functional mobility, clothing management, and social participation. The patient's raw score on the AM-PAC Daily Activity Inpatient Short Form is 13. A raw score of less than 19 suggests the patient may benefit from discharge to post-acute rehabilitation services. Please refer to the recommendation of the Occupational Therapist for safe discharge planning. OT will continue to follow pt 1-2x/wk to address the following goals to  w/in 10-14 days:   Goals   LTG Time Frame 10-14   Long Term Goal Please refer to LTGs listed below   Plan   Treatment Interventions ADL retraining;Functional transfer training;UE strengthening/ROM;Endurance training;Patient/family training;Equipment evaluation/education;Compensatory technique education;Continued evaluation;Activityengagement   Goal Expiration Date 25   OT Treatment Day 0   OT Frequency 1-2x/wk   Discharge Recommendation   Rehab Resource Intensity Level, OT II (Moderate Resource Intensity)   AM-PAC Daily Activity Inpatient   Lower Body Dressing 1   Bathing 2    Toileting 1   Upper Body Dressing 3   Grooming 3   Eating 3   Daily Activity Raw Score 13   Daily Activity Standardized Score (Calc for Raw Score >=11) 32.03   AM-PAC Applied Cognition Inpatient   Following a Speech/Presentation 3   Understanding Ordinary Conversation 3   Taking Medications 1   Remembering Where Things Are Placed or Put Away 2   Remembering List of 4-5 Errands 1   Taking Care of Complicated Tasks 1   Applied Cognition Raw Score 11   Applied Cognition Standardized Score 27.03       GOALS    Pt will improve activity tolerance to G for min 30 min txment sessions for increase engagement in functional tasks    Pt will complete bed mobility at a Min A level w/ G balance/safety demonstrated to decrease caregiver assistance required     Pt will increase seated tolerance to 3-5 with Poor+ dynamic seated balance to increase safety during participation in ADLs     Pt will complete UB dressing/self care w/ mod I using adaptive device and DME as needed     Pt will complete LB dressing/self care w/ mod A using adaptive device and DME as needed    Pt will complete toileting w/ mod A w/ G hygiene/thoroughness using DME as needed    Pt will be attentive 100% of the time during ongoing cognitive assessment w/ G participation to assist w/ safe d/c planning/recommendations    Pt will increase BUE strength by 1MM grade via AROM/AAROM exercises to increase independence in ADLs and transfers    Pt will verbalize 3 potential fall hazards and identify appropriate compensatory techniques to decrease fall risk in home environment     Pt will tolerate continued OOB assessment and appropriate functional transfer/mobility goals will be established by OTR as applicable     Pt will demonstrate ability to perform pressure relief techniques (ie: weight shifts, frequent changes in position, placement of positioning devices- pillows, wedges, etc) at a Mod I level after education from therapist       Kary Cruz OTR/TROY

## 2025-02-14 NOTE — PLAN OF CARE
Problem: OCCUPATIONAL THERAPY ADULT  Goal: Performs self-care activities at highest level of function for planned discharge setting.  See evaluation for individualized goals.  Description: Treatment Interventions: ADL retraining, Functional transfer training, UE strengthening/ROM, Endurance training, Patient/family training, Equipment evaluation/education, Compensatory technique education, Continued evaluation, Activityengagement          See flowsheet documentation for full assessment, interventions and recommendations.   Note: Limitation: Decreased ADL status, Decreased UE strength, Decreased Safe judgement during ADL, Decreased cognition, Decreased endurance, Decreased self-care trans, Decreased high-level ADLs, Decreased sensation  Prognosis: Fair  Assessment: Pt is a 74 y.o. male seen for OT evaluation s/p adm to [facility] on 2/10/2025 w/ Severe sepsis. Comorbidities affecting pt’s functional performance include a significant PMH of MS, neurogenic bladder, chronic SPT, NIDDM, HTN, HLD. Pt with active OT orders and activity orders for Activity as tolerated. Pt is poor historian, info on home setup and PLOF obtained via chart. Per chart: Pt lives with brother and sister in a one level house with 3 DIA vs ramped entrance. Pt has HHAs through [employer] and attends [employer] 2x/wk. At baseline, pt requires assist w/ ADLs and IADLs (able to perform UB ADLs per CM note). Per CM note: Pt non-ambulatory, able to get in/out of W/C with standby assistance and self-propel W/C. (-) . Pt denies falls PTA. Upon evaluation, pt currently requires Min A for UB ADLs, Max-Total A for LB ADLs, Total A for toileting, and Max A of 2 for bed mobility (OOB not assessed 2* decreased seated balance/tolerance) 2* the following deficits impacting occupational performance: weakness, decreased strength , decreased balance, decreased activity tolerance, limited functional reach, impaired sensation, impaired memory,  impaired problem solving, decreased safety awareness, and decreased postural control. These impairments, as well at pt’s personal factors of: DAI home environment, difficulty performing ADLs, difficulty performing transfers/mobility, limited insight into deficits, decreased initiation and engagement, fall risk , functional decline , and multiple admissions  limit pt’s ability to safely engage in all baseline areas of occupation. Pt to continue to benefit from continued acute OT services during hospital stay to address defined deficits and to maximize level of functional independence in the following Occupational Performance areas: eating, grooming, bathing/shower, toilet hygiene, dressing, health maintenance, functional mobility, clothing management, and social participation. The patient's raw score on the -PAC Daily Activity Inpatient Short Form is 13. A raw score of less than 19 suggests the patient may benefit from discharge to post-acute rehabilitation services. Please refer to the recommendation of the Occupational Therapist for safe discharge planning. OT will continue to follow pt 1-2x/wk to address the following goals to  w/in 10-14 days:     Rehab Resource Intensity Level, OT: II (Moderate Resource Intensity)

## 2025-02-14 NOTE — PLAN OF CARE
Problem: PAIN - ADULT  Goal: Verbalizes/displays adequate comfort level or baseline comfort level  Description: Interventions:  - Encourage patient to monitor pain and request assistance  - Assess pain using appropriate pain scale  - Administer analgesics based on type and severity of pain and evaluate response  - Implement non-pharmacological measures as appropriate and evaluate response  - Consider cultural and social influences on pain and pain management  - Notify physician/advanced practitioner if interventions unsuccessful or patient reports new pain  Outcome: Progressing     Problem: INFECTION - ADULT  Goal: Absence or prevention of progression during hospitalization  Description: INTERVENTIONS:  - Assess and monitor for signs and symptoms of infection  - Monitor lab/diagnostic results  - Monitor all insertion sites, i.e. indwelling lines, tubes, and drains  - Monitor endotracheal if appropriate and nasal secretions for changes in amount and color  - [place] appropriate cooling/warming therapies per order  - Administer medications as ordered  - Instruct and encourage patient and family to use good hand hygiene technique  - Identify and instruct in appropriate isolation precautions for identified infection/condition  Outcome: Progressing     Problem: DISCHARGE PLANNING  Goal: Discharge to home or other facility with appropriate resources  Description: INTERVENTIONS:  - Identify barriers to discharge w/patient and caregiver  - Arrange for needed discharge resources and transportation as appropriate  - Identify discharge learning needs (meds, wound care, etc.)  - Arrange for interpretive services to assist at discharge as needed  - Refer to Case Management Department for coordinating discharge planning if the patient needs post-hospital services based on physician/advanced practitioner order or complex needs related to functional status, cognitive ability, or social support system  Outcome: Progressing      Problem: Knowledge Deficit  Goal: Patient/family/caregiver demonstrates understanding of disease process, treatment plan, medications, and discharge instructions  Description: Complete learning assessment and assess knowledge base.  Interventions:  - Provide teaching at level of understanding  - Provide teaching via preferred learning methods  Outcome: Progressing     Problem: GENITOURINARY - ADULT  Goal: Maintains or returns to baseline urinary function  Description: INTERVENTIONS:  - Assess urinary function  - Encourage oral fluids to ensure adequate hydration if ordered  - Administer IV fluids as ordered to ensure adequate hydration  - Administer ordered medications as needed  - Offer frequent toileting  - Follow urinary retention protocol if ordered  Outcome: Progressing  Goal: Absence of urinary retention  Description: INTERVENTIONS:  - Assess patient’s ability to void and empty bladder  - Monitor I/O  - Bladder scan as needed  - Discuss with physician/AP medications to alleviate retention as needed  - Discuss catheterization for long term situations as appropriate  Outcome: Progressing  Goal: Urinary catheter remains patent  Description: INTERVENTIONS:  - Assess patency of urinary catheter  - If patient has a chronic dela cruz, consider changing catheter if non-functioning  - Follow guidelines for intermittent irrigation of non-functioning urinary catheter  Outcome: Progressing     Problem: METABOLIC, FLUID AND ELECTROLYTES - ADULT  Goal: Electrolytes maintained within normal limits  Description: INTERVENTIONS:  - Monitor labs and assess patient for signs and symptoms of electrolyte imbalances  - Administer electrolyte replacement as ordered  - Monitor response to electrolyte replacements, including repeat lab results as appropriate  - Instruct patient on fluid and nutrition as appropriate  Outcome: Progressing  Goal: Fluid balance maintained  Description: INTERVENTIONS:  - Monitor labs   - Monitor I/O and WT  -  Instruct patient on fluid and nutrition as appropriate  - Assess for signs & symptoms of volume excess or deficit  Outcome: Progressing  Goal: Glucose maintained within target range  Description: INTERVENTIONS:  - Monitor Blood Glucose as ordered  - Assess for signs and symptoms of hyperglycemia and hypoglycemia  - Administer ordered medications to maintain glucose within target range  - Assess nutritional intake and initiate nutrition service referral as needed  Outcome: Progressing

## 2025-02-14 NOTE — ASSESSMENT & PLAN NOTE
Hx MS, neurogenic bladder  Continue PTA baclofen and gabapentin  Managed by [facility], family wishes for patient to go to snf on discharge  Case management following    Patient called because he has surgery 2/9. Patient has a sore on the leg that we are going to be doing surgery on.  Gave antibiotics for sore before and patient didn't know if he needed to be on antibiotics again before surgery.

## 2025-02-14 NOTE — ASSESSMENT & PLAN NOTE
Meeting criteria with tachycardia, tachypnea, leukocytosis, elevated lactic acid  Suspected secondary to UTI  History of MDRO  Urine cultures with enteric coccus sensitive to ertapenem  Completed 3-day course of ertapenem  Patient remains clinically well, afebrile with resolved leukocytosis  Medically stable for discharge to SNF

## 2025-02-14 NOTE — PLAN OF CARE
Problem: PHYSICAL THERAPY ADULT  Goal: Performs mobility at highest level of function for planned discharge setting.  See evaluation for individualized goals.  Description: Treatment/Interventions: LE strengthening/ROM, Therapeutic exercise, Endurance training, Patient/family training, Equipment eval/education, Bed mobility, Compensatory technique education, Spoke to nursing, OT          See flowsheet documentation for full assessment, interventions and recommendations.  Note: Prognosis: Guarded  Problem List: Decreased strength, Decreased range of motion, Decreased endurance, Impaired balance, Decreased mobility, Decreased coordination, Decreased cognition, Impaired judgement, Decreased safety awareness, Impaired tone, Pain  Assessment: Pt is 74 y.o. male seen for a PT evaluation s/p admit to [facility] on 2/10/2025. Pt presenting w/ principal dx of severe sepsis (suspected 2/2 UTI per most recent Hospitalist note). Please see above for other active problem list / PMH. PT now consulted to assess functional mobility and needs for safe d/c planning. Prior to admission, pt reportedly primarily stayed in bed, questionably went to [facility] 2x/wk via W/C van, lives w/ sister + brother. Currently pt requires MaxAx2 for bed skills. Pt presents w/ overall mobility deficits 2* to: decreased LE strength; decreased LE ROM; decreased endurance; impaired balance; fatigue; bed/chair alarms; multiple lines. These impairments place pt at risk for falls. Pt will continue to benefit from skilled PT interventions to address stated impairments; to maximize functional potential; for ongoing pt/ family training; and DME needs. PT is currently recommending level 2 resources.        Rehab Resource Intensity Level, PT: II (Moderate Resource Intensity)    See flowsheet documentation for full assessment.

## 2025-02-14 NOTE — WOUND OSTOMY CARE
Consult Note - Wound   Mathew Rico 74 y.o. male MRN: [id number]  Unit/Bed#: [place] [address] -01 Encounter: [id number]      History and Present Illness:  74 year old male presented to the hospital with abdominal pain.  Patient's history significant for multiple sclerosis, neurogenic bladder with suprapubic catheter, DM, HTN, CVA.    Assessment Findings:   Patient sleepy, assessed along with primary RN.  Dependent for turning/repositioning.  On Tortoise positioning system.  Suprapubic catheter in place--small beefy red hypergranulation tissue to site, no drainage.  Occasionally incontinent of stool.  Nutrition team following, dietary supplements ordered.  Bilateral heels intact, pink, blanchable with preventative foam dressings in place--offloading heel boots applied.  Buttocks and sacrum intact, erythematous, and blanchable.  Traumatic abrasion to right medial foot--dry, beefy red, partial thickness tissue loss.  Rosa-wound intact.  Right 2nd toe--diabetic ulcer versus traumatic abrasions.  Dry, tan.  Appears scabbed with pink edges.  Rosa-wound intact.  No drainage.    See flowsheet for wound details.    Skin Care Plan:   1-Apply silicone bordered foam dressings to bilateral heels and sacrum for prevention.  Jerome with P.  Peel back for skin assessments at least daily and re-apply.  Change dressings every 3 days and as needed.  2-Elevate heels off of bed/chair surface--offloading heel boots.  3-Tortoise positioning system.  4-Apply moisturizing skin cream to body daily and as needed.  5-Turn/reposition every 2 hours while in bed and weight shift frequently while in chair for pressure re-distribution on skin.   6-Right anterior/medial foot--cleanse with normal saline, pat dry.  Apply silicone bordered foam dressing.  Change dressing every 3 days.  7-Righ 2nd toe--Apply 3m Cavilon no sting skin prep daily.  8-Suprapubic catheter care daily per protocol--cleanse and cover with split guaze.  Ensure catheter is  secured.    Wound care team to sign-off at this time.  Plan of care reviewed with primary RN.    Wound 02/14/25 Toe D2, second Anterior;Right (Active)   Wound Image   02/14/25 1122   Wound Description Dry;Tan 02/14/25 1122   Rosa-wound Assessment Pink 02/14/25 1122   Wound Length (cm) 1 cm 02/14/25 1122   Wound Width (cm) 0.5 cm 02/14/25 1122   Wound Depth (cm) 0 cm 02/14/25 1122   Wound Surface Area (cm^2) 0.5 cm^2 02/14/25 1122   Wound Volume (cm^3) 0 cm^3 02/14/25 1122   Calculated Wound Volume (cm^3) 0 cm^3 02/14/25 1122   Drainage Amount None 02/14/25 1122   Dressing Open to air 02/14/25 1122       Wound 02/14/25 Foot Anterior;Right;Medial (Active)   Wound Image   02/14/25 1122   Wound Description Dry;Beefy red 02/14/25 1122   Rosa-wound Assessment Intact 02/14/25 1122   Wound Length (cm) 0.5 cm 02/14/25 1122   Wound Width (cm) 0.6 cm 02/14/25 1122   Wound Depth (cm) 0.1 cm 02/14/25 1122   Wound Surface Area (cm^2) 0.3 cm^2 02/14/25 1122   Wound Volume (cm^3) 0.03 cm^3 02/14/25 1122   Calculated Wound Volume (cm^3) 0.03 cm^3 02/14/25 1122   Drainage Amount None 02/14/25 1122   Non-staged Wound Description Partial thickness 02/14/25 1122   Treatments Cleansed 02/14/25 1122   Dressing Foam, Silicon (eg. Allevyn, etc) 02/14/25 1122   Dressing Changed New 02/14/25 1122   Patient Tolerance Tolerated well 02/14/25 1122   Dressing Status Clean;Dry;Intact 02/14/25 1122       Irma Carl RN, BSN, CWON

## 2025-02-15 LAB
BACTERIA BLD CULT: NORMAL
BACTERIA BLD CULT: NORMAL
GLUCOSE SERPL-MCNC: 160 MG/DL (ref 65–140)
GLUCOSE SERPL-MCNC: 160 MG/DL (ref 65–140)
GLUCOSE SERPL-MCNC: 187 MG/DL (ref 65–140)
GLUCOSE SERPL-MCNC: 225 MG/DL (ref 65–140)

## 2025-02-15 PROCEDURE — 99232 SBSQ HOSP IP/OBS MODERATE 35: CPT | Performed by: INTERNAL MEDICINE

## 2025-02-15 PROCEDURE — 82948 REAGENT STRIP/BLOOD GLUCOSE: CPT

## 2025-02-15 RX ADMIN — LATANOPROST 1 DROP: 50 SOLUTION OPHTHALMIC at 21:59

## 2025-02-15 RX ADMIN — ATORVASTATIN CALCIUM 80 MG: 80 TABLET, FILM COATED ORAL at 16:14

## 2025-02-15 RX ADMIN — AMLODIPINE BESYLATE 10 MG: 10 TABLET ORAL at 07:32

## 2025-02-15 RX ADMIN — ENOXAPARIN SODIUM 40 MG: 40 INJECTION SUBCUTANEOUS at 07:32

## 2025-02-15 RX ADMIN — INSULIN LISPRO 1 UNITS: 100 INJECTION, SOLUTION INTRAVENOUS; SUBCUTANEOUS at 17:39

## 2025-02-15 RX ADMIN — Medication 500 MCG: at 07:32

## 2025-02-15 RX ADMIN — INSULIN LISPRO 1 UNITS: 100 INJECTION, SOLUTION INTRAVENOUS; SUBCUTANEOUS at 07:29

## 2025-02-15 RX ADMIN — NYSTATIN: 100000 POWDER TOPICAL at 07:33

## 2025-02-15 RX ADMIN — MIRTAZAPINE 7.5 MG: 7.5 TABLET, FILM COATED ORAL at 21:59

## 2025-02-15 RX ADMIN — BACLOFEN 5 MG: 10 TABLET ORAL at 16:14

## 2025-02-15 RX ADMIN — GABAPENTIN 600 MG: 300 CAPSULE ORAL at 21:58

## 2025-02-15 RX ADMIN — CLOPIDOGREL BISULFATE 75 MG: 75 TABLET ORAL at 07:32

## 2025-02-15 RX ADMIN — GABAPENTIN 300 MG: 300 CAPSULE ORAL at 07:32

## 2025-02-15 RX ADMIN — INSULIN LISPRO 1 UNITS: 100 INJECTION, SOLUTION INTRAVENOUS; SUBCUTANEOUS at 21:59

## 2025-02-15 RX ADMIN — POLYETHYLENE GLYCOL 3350 17 G: 17 POWDER, FOR SOLUTION ORAL at 07:29

## 2025-02-15 RX ADMIN — INSULIN LISPRO 2 UNITS: 100 INJECTION, SOLUTION INTRAVENOUS; SUBCUTANEOUS at 12:45

## 2025-02-15 RX ADMIN — BACLOFEN 5 MG: 10 TABLET ORAL at 21:58

## 2025-02-15 RX ADMIN — SENNOSIDES 17.2 MG: 8.6 TABLET ORAL at 21:58

## 2025-02-15 RX ADMIN — PROPRANOLOL HYDROCHLORIDE 60 MG: 60 CAPSULE, EXTENDED RELEASE ORAL at 07:33

## 2025-02-15 RX ADMIN — PANTOPRAZOLE SODIUM 40 MG: 40 TABLET, DELAYED RELEASE ORAL at 05:37

## 2025-02-15 RX ADMIN — BUDESONIDE AND FORMOTEROL FUMARATE DIHYDRATE 2 PUFF: 160; 4.5 AEROSOL RESPIRATORY (INHALATION) at 07:29

## 2025-02-15 RX ADMIN — GABAPENTIN 300 MG: 300 CAPSULE ORAL at 16:14

## 2025-02-15 RX ADMIN — BUDESONIDE AND FORMOTEROL FUMARATE DIHYDRATE 2 PUFF: 160; 4.5 AEROSOL RESPIRATORY (INHALATION) at 16:11

## 2025-02-15 RX ADMIN — BACLOFEN 5 MG: 10 TABLET ORAL at 07:32

## 2025-02-15 NOTE — PROGRESS NOTES
Progress Note - Hospitalist   Name: Mathew Rico 74 y.o. male I MRN: [id number]  Unit/Bed#: [place] [address] -01 I Date of Admission: 2/10/2025   Date of Service: 2/15/2025 I Hospital Day: 5    Assessment & Plan  Severe sepsis (HCC)  Meeting criteria with tachycardia, tachypnea, leukocytosis, elevated lactic acid  Suspected secondary to UTI  History of MDRO  Urine cultures with enteric coccus sensitive to ertapenem  Completed 3-day course of ertapenem  Patient remains clinically well, afebrile with resolved leukocytosis  Medically stable for discharge to SNF  History of CVA (cerebrovascular accident)  Continue PTA Plavix and statin  UTI (urinary tract infection)  Hx MDRO Enterobacter cloacea   Suprapubic catheter exchanged in the emergency department  Management as above  Type 2 diabetes mellitus without complication, without long-term current use of insulin (HCC)  Continue sliding scale insulin  Resume oral regimen on discharge  Multiple sclerosis (HCC)  Hx MS, neurogenic bladder  Continue PTA baclofen and gabapentin  Managed by [facility], family wishes for patient to go to snf on discharge  Case management following   Urinary retention  Neurogenic bladder  SPT in place  Outpatient urology follow-up    VTE Pharmacologic Prophylaxis: Lovenox    Patient Centered Rounds:  Patient care rounds were performed with nursing    Education and Discussions with Family / Patient: Patient, Updated brother on the telephone     Time Spent for Care: I have spent a total time of 36 minutes on 02/15/25 in caring for this patient including Diagnostic results, Impressions, Counseling / Coordination of care, Documenting in the medical record, and Reviewing / ordering tests, medicine, procedures  .      Current Length of Stay: 5 day(s)    Current Patient Status: Inpatient   Certification Statement: The patient will continue to require additional inpatient hospital stay due to awaiting County clearance and placement to SNF    Discharge  Plan: Medically stable for discharge    Code Status: Level 1 - Full Code      Subjective:   Patient seen and evaluated at bedside. He feels well     Objective:     Vitals:   Temp (24hrs), Av.2 °F (36.8 °C), Min:97.7 °F (36.5 °C), Max:98.6 °F (37 °C)    Temp:  [97.7 °F (36.5 °C)-98.6 °F (37 °C)] 98.6 °F (37 °C)  HR:  [69-73] 73  Resp:  [18] 18  BP: (115-131)/(51-62) 131/62  SpO2:  [95 %-97 %] 97 %  Body mass index is 24.26 kg/m².     Input and Output Summary (last 24 hours):       Intake/Output Summary (Last 24 hours) at 2/15/2025 0821  Last data filed at 2/15/2025 0453  Gross per 24 hour   Intake 1260 ml   Output 550 ml   Net 710 ml       Physical Exam:     Physical Exam  Vitals reviewed.   Constitutional:       General: He is not in acute distress.     Appearance: He is well-developed. He is not toxic-appearing or diaphoretic.      Comments: Chronically ill appearing    HENT:      Head: Normocephalic and atraumatic.      Mouth/Throat:      Mouth: Mucous membranes are moist.   Eyes:      General: No scleral icterus.     Extraocular Movements: Extraocular movements intact.   Cardiovascular:      Rate and Rhythm: Normal rate and regular rhythm.      Heart sounds: Normal heart sounds.   Pulmonary:      Effort: Pulmonary effort is normal. No respiratory distress.      Breath sounds: Normal breath sounds. No wheezing or rales.   Abdominal:      General: There is no distension.      Palpations: Abdomen is soft.      Tenderness: There is no abdominal tenderness. There is no guarding or rebound.   Musculoskeletal:         General: No swelling, tenderness or deformity.   Skin:     General: Skin is warm and dry.   Neurological:      Mental Status: He is alert. Mental status is at baseline.   Psychiatric:         Mood and Affect: Mood normal.         Behavior: Behavior normal.         Thought Content: Thought content normal.         Judgment: Judgment normal.         Additional Data:     Labs: I have reviewed pertinent  results     Results from last 7 days   Lab Units 02/14/25  0646 02/13/25  0456 02/12/25  0508   WBC Thousand/uL 9.53   < > 9.99   HEMOGLOBIN g/dL 12.6   < > 13.1   HEMATOCRIT % 39.7   < > 43.4   PLATELETS Thousands/uL 267   < > 269   SEGS PCT %  --   --  58   LYMPHO PCT %  --   --  29   MONO PCT %  --   --  8   EOS PCT %  --   --  4    < > = values in this interval not displayed.     Results from last 7 days   Lab Units 02/14/25  0646 02/11/25  0516 02/10/25  1846   SODIUM mmol/L 138   < > 139   POTASSIUM mmol/L 3.9   < > 4.2   CHLORIDE mmol/L 105   < > 106   CO2 mmol/L 25   < > 23   BUN mg/dL 12   < > 17   CREATININE mg/dL 0.83   < > 0.84   ANION GAP mmol/L 8   < > 10   CALCIUM mg/dL 9.4   < > 10.2   ALBUMIN g/dL  --   --  4.0   TOTAL BILIRUBIN mg/dL  --   --  0.34   ALK PHOS U/L  --   --  75   ALT U/L  --   --  6*   AST U/L  --   --  11*   GLUCOSE RANDOM mg/dL 122   < > 87    < > = values in this interval not displayed.     Results from last 7 days   Lab Units 02/10/25  1932   INR  0.94     Results from last 7 days   Lab Units 02/15/25  0611 02/14/25  2019 02/14/25  1606 02/14/25  1153 02/14/25  0632 02/13/25  2022 02/13/25  1638 02/13/25  1033 02/13/25  0611 02/12/25  2045 02/12/25  1615 02/12/25  1129   POC GLUCOSE mg/dl 160* 298* 157* 186* 139 188* 143* 103 116 154* 175* 149*     Results from last 7 days   Lab Units 02/10/25  1846   HEMOGLOBIN A1C % 6.3*     Results from last 7 days   Lab Units 02/11/25  0404 02/11/25  0027 02/10/25  2214 02/10/25  1954   LACTIC ACID mmol/L 2.1* 2.8* 2.2*  --    PROCALCITONIN ng/ml  --   --   --  <0.05         Imaging: I have reviewed pertinent imaging       Recent Cultures (last 7 days):     Results from last 7 days   Lab Units 02/10/25  2131 02/10/25  1955 02/10/25  1954   BLOOD CULTURE   --  No Growth After 4 Days. No Growth After 4 Days.   URINE CULTURE  >100,000 cfu/ml Enterobacter cloacae*  50,000-59,000 cfu/ml  --   --        Last 24 Hours Medication List:   Current  Facility-Administered Medications   Medication Dose Route Frequency Provider Last Rate    acetaminophen  650 mg Oral Q6H PRN Petra S Rodney, CRNP      aluminum-magnesium hydroxide-simethicone  30 mL Oral Q4H PRN Steve Shook DO      amLODIPine  10 mg Oral Daily Petra S Rodney, CRNP      atorvastatin  80 mg Oral Daily With Dinner Petra S Rodney, CRNP      baclofen  5 mg Oral TID Petra S Rodney, CRNP      budesonide-formoterol  2 puff Inhalation BID Petra S Rodney, CRNP      clopidogrel  75 mg Oral Daily Petra S Rodney, CRNP      cyanocobalamin  500 mcg Oral Daily Petra S Rodney, CRNP      enoxaparin  40 mg Subcutaneous Daily Petra S Rodney, CRNP      gabapentin  300 mg Oral BID Fernando Miguelheide Pulido, CRNP      gabapentin  600 mg Oral HS Petra S Rodney, CRNP      insulin lispro  1-5 Units Subcutaneous TID AC Petra S Rodney, CRNP      insulin lispro  1-5 Units Subcutaneous HS Petra S Rodney, CRNP      latanoprost  1 drop Both Eyes HS Petra S Rodney, CRNP      melatonin  3 mg Oral HS PRN Petra S Rodney, CRNP      mirtazapine  7.5 mg Oral HS Petra S Rodney, CRNP      nystatin   Topical BID Steve Shook DO      ondansetron  4 mg Intravenous Q4H PRN Steve Shook DO      oxyCODONE  5 mg Oral Q6H PRN Petra S Rodney, CRNP      pantoprazole  40 mg Oral Early Morning Petra S Rodney, CRNP      polyethylene glycol  17 g Oral Daily Petra S Rodney, CRNP      propranolol  60 mg Oral Daily Steve Shook DO      senna  2 tablet Oral HS Petra S Rodney, CRNP          Today, Patient Was Seen By: Steve Shook DO    ** Please Note: Dictation voice to text software may have been used in the creation of this document. **

## 2025-02-15 NOTE — PLAN OF CARE
Problem: Potential for Falls  Goal: Patient will remain free of falls  Description: INTERVENTIONS:  - Educate patient/family on patient safety including physical limitations  - Instruct patient to call for assistance with activity   - Consult OT/PT to assist with strengthening/mobility   - Keep Call bell within reach  - Keep bed low and locked with side rails adjusted as appropriate  - Keep care items and personal belongings within reach  - Initiate and maintain comfort rounds  - Make Fall Risk Sign visible to staff  - Offer Toileting every  Hours, in advance of need  - Initiate/Maintain bed alarm  - Obtain necessary fall risk management equipment:   - Apply yellow socks and bracelet for high fall risk patients  - Consider moving patient to room near nurses station  Outcome: Progressing     Problem: PAIN - ADULT  Goal: Verbalizes/displays adequate comfort level or baseline comfort level  Description: Interventions:  - Encourage patient to monitor pain and request assistance  - Assess pain using appropriate pain scale  - Administer analgesics based on type and severity of pain and evaluate response  - Implement non-pharmacological measures as appropriate and evaluate response  - Consider cultural and social influences on pain and pain management  - Notify physician/advanced practitioner if interventions unsuccessful or patient reports new pain  Outcome: Progressing     Problem: INFECTION - ADULT  Goal: Absence or prevention of progression during hospitalization  Description: INTERVENTIONS:  - Assess and monitor for signs and symptoms of infection  - Monitor lab/diagnostic results  - Monitor all insertion sites, i.e. indwelling lines, tubes, and drains  - Monitor endotracheal if appropriate and nasal secretions for changes in amount and color  - [place] appropriate cooling/warming therapies per order  - Administer medications as ordered  - Instruct and encourage patient and family to use good hand hygiene  technique  - Identify and instruct in appropriate isolation precautions for identified infection/condition  Outcome: Progressing     Problem: DISCHARGE PLANNING  Goal: Discharge to home or other facility with appropriate resources  Description: INTERVENTIONS:  - Identify barriers to discharge w/patient and caregiver  - Arrange for needed discharge resources and transportation as appropriate  - Identify discharge learning needs (meds, wound care, etc.)  - Arrange for interpretive services to assist at discharge as needed  - Refer to Case Management Department for coordinating discharge planning if the patient needs post-hospital services based on physician/advanced practitioner order or complex needs related to functional status, cognitive ability, or social support system  Outcome: Progressing     Problem: Knowledge Deficit  Goal: Patient/family/caregiver demonstrates understanding of disease process, treatment plan, medications, and discharge instructions  Description: Complete learning assessment and assess knowledge base.  Interventions:  - Provide teaching at level of understanding  - Provide teaching via preferred learning methods  Outcome: Progressing     Problem: GASTROINTESTINAL - ADULT  Goal: Minimal or absence of nausea and/or vomiting  Description: INTERVENTIONS:  - Administer IV fluids if ordered to ensure adequate hydration  - Maintain NPO status until nausea and vomiting are resolved  - Nasogastric tube if ordered  - Administer ordered antiemetic medications as needed  - Provide nonpharmacologic comfort measures as appropriate  - Advance diet as tolerated, if ordered  - Consider nutrition services referral to assist patient with adequate nutrition and appropriate food choices  Outcome: Progressing  Goal: Maintains or returns to baseline bowel function  Description: INTERVENTIONS:  - Assess bowel function  - Encourage oral fluids to ensure adequate hydration  - Administer IV fluids if ordered to ensure  adequate hydration  - Administer ordered medications as needed  - Encourage mobilization and activity  - Consider nutritional services referral to assist patient with adequate nutrition and appropriate food choices  Outcome: Progressing  Goal: Maintains adequate nutritional intake  Description: INTERVENTIONS:  - Monitor percentage of each meal consumed  - Identify factors contributing to decreased intake, treat as appropriate  - Assist with meals as needed  - Monitor I&O, weight, and lab values if indicated  - Obtain nutrition services referral as needed  Outcome: Progressing     Problem: GENITOURINARY - ADULT  Goal: Maintains or returns to baseline urinary function  Description: INTERVENTIONS:  - Assess urinary function  - Encourage oral fluids to ensure adequate hydration if ordered  - Administer IV fluids as ordered to ensure adequate hydration  - Administer ordered medications as needed  - Offer frequent toileting  - Follow urinary retention protocol if ordered  Outcome: Progressing  Goal: Absence of urinary retention  Description: INTERVENTIONS:  - Assess patient’s ability to void and empty bladder  - Monitor I/O  - Bladder scan as needed  - Discuss with physician/AP medications to alleviate retention as needed  - Discuss catheterization for long term situations as appropriate  Outcome: Progressing  Goal: Urinary catheter remains patent  Description: INTERVENTIONS:  - Assess patency of urinary catheter  - If patient has a chronic dela cruz, consider changing catheter if non-functioning  - Follow guidelines for intermittent irrigation of non-functioning urinary catheter  Outcome: Progressing     Problem: METABOLIC, FLUID AND ELECTROLYTES - ADULT  Goal: Electrolytes maintained within normal limits  Description: INTERVENTIONS:  - Monitor labs and assess patient for signs and symptoms of electrolyte imbalances  - Administer electrolyte replacement as ordered  - Monitor response to electrolyte replacements, including  repeat lab results as appropriate  - Instruct patient on fluid and nutrition as appropriate  Outcome: Progressing  Goal: Fluid balance maintained  Description: INTERVENTIONS:  - Monitor labs   - Monitor I/O and WT  - Instruct patient on fluid and nutrition as appropriate  - Assess for signs & symptoms of volume excess or deficit  Outcome: Progressing  Goal: Glucose maintained within target range  Description: INTERVENTIONS:  - Monitor Blood Glucose as ordered  - Assess for signs and symptoms of hyperglycemia and hypoglycemia  - Administer ordered medications to maintain glucose within target range  - Assess nutritional intake and initiate nutrition service referral as needed  Outcome: Progressing     Problem: Prexisting or High Potential for Compromised Skin Integrity  Goal: Skin integrity is maintained or improved  Description: INTERVENTIONS:  - Identify patients at risk for skin breakdown  - Assess and monitor skin integrity  - Assess and monitor nutrition and hydration status  - Monitor labs   - Assess for incontinence   - Turn and reposition patient  - Assist with mobility/ambulation  - Relieve pressure over bony prominences  - Avoid friction and shearing  - Provide appropriate hygiene as needed including keeping skin clean and dry  - Evaluate need for skin moisturizer/barrier cream  - Collaborate with interdisciplinary team   - Patient/family teaching  - Consider wound care consult   Outcome: Progressing     Problem: Nutrition/Hydration-ADULT  Goal: Nutrient/Hydration intake appropriate for improving, restoring or maintaining nutritional needs  Description: Monitor and assess patient's nutrition/hydration status for malnutrition. Collaborate with interdisciplinary team and initiate plan and interventions as ordered.  Monitor patient's weight and dietary intake as ordered or per policy. Utilize nutrition screening tool and intervene as necessary. Determine patient's food preferences and provide high-protein,  high-caloric foods as appropriate.     INTERVENTIONS:  - Monitor oral intake, urinary output, labs, and treatment plans  - Assess nutrition and hydration status and recommend course of action  - Evaluate amount of meals eaten  - Assist patient with eating if necessary   - Allow adequate time for meals  - Recommend/ encourage appropriate diets, oral nutritional supplements, and vitamin/mineral supplements  - Order, calculate, and assess calorie counts as needed  - Recommend, monitor, and adjust tube feedings and TPN/PPN based on assessed needs  - Assess need for intravenous fluids  - Provide specific nutrition/hydration education as appropriate  - Include patient/family/caregiver in decisions related to nutrition  Outcome: Progressing

## 2025-02-15 NOTE — PLAN OF CARE
Problem: Potential for Falls  Goal: Patient will remain free of falls  Description: INTERVENTIONS:  - Educate patient/family on patient safety including physical limitations  - Instruct patient to call for assistance with activity   - Consult OT/PT to assist with strengthening/mobility   - Keep Call bell within reach  - Keep bed low and locked with side rails adjusted as appropriate  - Keep care items and personal belongings within reach  - Initiate and maintain comfort rounds  - Make Fall Risk Sign visible to staff  - Offer Toileting every  Hours, in advance of need  - Initiate/Maintain alarm  - Obtain necessary fall risk management equip  - Apply yellow socks and bracelet for high fall risk patients  - Consider moving patient to room near nurses station  Outcome: Progressing     Problem: PAIN - ADULT  Goal: Verbalizes/displays adequate comfort level or baseline comfort level  Description: Interventions:  - Encourage patient to monitor pain and request assistance  - Assess pain using appropriate pain scale  - Administer analgesics based on type and severity of pain and evaluate response  - Implement non-pharmacological measures as appropriate and evaluate response  - Consider cultural and social influences on pain and pain management  - Notify physician/advanced practitioner if interventions unsuccessful or patient reports new pain  Outcome: Progressing     Problem: INFECTION - ADULT  Goal: Absence or prevention of progression during hospitalization  Description: INTERVENTIONS:  - Assess and monitor for signs and symptoms of infection  - Monitor lab/diagnostic results  - Monitor all insertion sites, i.e. indwelling lines, tubes, and drains  - Monitor endotracheal if appropriate and nasal secretions for changes in amount and color  - [place] appropriate cooling/warming therapies per order  - Administer medications as ordered  - Instruct and encourage patient and family to use good hand hygiene technique  -  Identify and instruct in appropriate isolation precautions for identified infection/condition  Outcome: Progressing     Problem: DISCHARGE PLANNING  Goal: Discharge to home or other facility with appropriate resources  Description: INTERVENTIONS:  - Identify barriers to discharge w/patient and caregiver  - Arrange for needed discharge resources and transportation as appropriate  - Identify discharge learning needs (meds, wound care, etc.)  - Arrange for interpretive services to assist at discharge as needed  - Refer to Case Management Department for coordinating discharge planning if the patient needs post-hospital services based on physician/advanced practitioner order or complex needs related to functional status, cognitive ability, or social support system  Outcome: Progressing     Problem: Knowledge Deficit  Goal: Patient/family/caregiver demonstrates understanding of disease process, treatment plan, medications, and discharge instructions  Description: Complete learning assessment and assess knowledge base.  Interventions:  - Provide teaching at level of understanding  - Provide teaching via preferred learning methods  Outcome: Progressing     Problem: GASTROINTESTINAL - ADULT  Goal: Minimal or absence of nausea and/or vomiting  Description: INTERVENTIONS:  - Administer IV fluids if ordered to ensure adequate hydration  - Maintain NPO status until nausea and vomiting are resolved  - Nasogastric tube if ordered  - Administer ordered antiemetic medications as needed  - Provide nonpharmacologic comfort measures as appropriate  - Advance diet as tolerated, if ordered  - Consider nutrition services referral to assist patient with adequate nutrition and appropriate food choices  Outcome: Progressing  Goal: Maintains or returns to baseline bowel function  Description: INTERVENTIONS:  - Assess bowel function  - Encourage oral fluids to ensure adequate hydration  - Administer IV fluids if ordered to ensure adequate  hydration  - Administer ordered medications as needed  - Encourage mobilization and activity  - Consider nutritional services referral to assist patient with adequate nutrition and appropriate food choices  Outcome: Progressing  Goal: Maintains adequate nutritional intake  Description: INTERVENTIONS:  - Monitor percentage of each meal consumed  - Identify factors contributing to decreased intake, treat as appropriate  - Assist with meals as needed  - Monitor I&O, weight, and lab values if indicated  - Obtain nutrition services referral as needed  Outcome: Progressing     Problem: GENITOURINARY - ADULT  Goal: Maintains or returns to baseline urinary function  Description: INTERVENTIONS:  - Assess urinary function  - Encourage oral fluids to ensure adequate hydration if ordered  - Administer IV fluids as ordered to ensure adequate hydration  - Administer ordered medications as needed  - Offer frequent toileting  - Follow urinary retention protocol if ordered  Outcome: Progressing  Goal: Absence of urinary retention  Description: INTERVENTIONS:  - Assess patient’s ability to void and empty bladder  - Monitor I/O  - Bladder scan as needed  - Discuss with physician/AP medications to alleviate retention as needed  - Discuss catheterization for long term situations as appropriate  Outcome: Progressing  Goal: Urinary catheter remains patent  Description: INTERVENTIONS:  - Assess patency of urinary catheter  - If patient has a chronic dela cruz, consider changing catheter if non-functioning  - Follow guidelines for intermittent irrigation of non-functioning urinary catheter  Outcome: Progressing     Problem: METABOLIC, FLUID AND ELECTROLYTES - ADULT  Goal: Electrolytes maintained within normal limits  Description: INTERVENTIONS:  - Monitor labs and assess patient for signs and symptoms of electrolyte imbalances  - Administer electrolyte replacement as ordered  - Monitor response to electrolyte replacements, including repeat lab  results as appropriate  - Instruct patient on fluid and nutrition as appropriate  Outcome: Progressing  Goal: Fluid balance maintained  Description: INTERVENTIONS:  - Monitor labs   - Monitor I/O and WT  - Instruct patient on fluid and nutrition as appropriate  - Assess for signs & symptoms of volume excess or deficit  Outcome: Progressing  Goal: Glucose maintained within target range  Description: INTERVENTIONS:  - Monitor Blood Glucose as ordered  - Assess for signs and symptoms of hyperglycemia and hypoglycemia  - Administer ordered medications to maintain glucose within target range  - Assess nutritional intake and initiate nutrition service referral as needed  Outcome: Progressing     Problem: Prexisting or High Potential for Compromised Skin Integrity  Goal: Skin integrity is maintained or improved  Description: INTERVENTIONS:  - Identify patients at risk for skin breakdown  - Assess and monitor skin integrity  - Assess and monitor nutrition and hydration status  - Monitor labs   - Assess for incontinence   - Turn and reposition patient  - Assist with mobility/ambulation  - Relieve pressure over bony prominences  - Avoid friction and shearing  - Provide appropriate hygiene as needed including keeping skin clean and dry  - Evaluate need for skin moisturizer/barrier cream  - Collaborate with interdisciplinary team   - Patient/family teaching  - Consider wound care consult   Outcome: Progressing     Problem: Nutrition/Hydration-ADULT  Goal: Nutrient/Hydration intake appropriate for improving, restoring or maintaining nutritional needs  Description: Monitor and assess patient's nutrition/hydration status for malnutrition. Collaborate with interdisciplinary team and initiate plan and interventions as ordered.  Monitor patient's weight and dietary intake as ordered or per policy. Utilize nutrition screening tool and intervene as necessary. Determine patient's food preferences and provide high-protein, high-caloric  foods as appropriate.     INTERVENTIONS:  - Monitor oral intake, urinary output, labs, and treatment plans  - Assess nutrition and hydration status and recommend course of action  - Evaluate amount of meals eaten  - Assist patient with eating if necessary   - Allow adequate time for meals  - Recommend/ encourage appropriate diets, oral nutritional supplements, and vitamin/mineral supplements  - Order, calculate, and assess calorie counts as needed  - Recommend, monitor, and adjust tube feedings and TPN/PPN based on assessed needs  - Assess need for intravenous fluids  - Provide specific nutrition/hydration education as appropriate  - Include patient/family/caregiver in decisions related to nutrition  Outcome: Progressing

## 2025-02-16 PROBLEM — R11.2 NAUSEA AND VOMITING: Status: ACTIVE | Noted: 2025-02-16

## 2025-02-16 LAB
GLUCOSE SERPL-MCNC: 133 MG/DL (ref 65–140)
GLUCOSE SERPL-MCNC: 159 MG/DL (ref 65–140)
GLUCOSE SERPL-MCNC: 167 MG/DL (ref 65–140)
GLUCOSE SERPL-MCNC: 182 MG/DL (ref 65–140)

## 2025-02-16 PROCEDURE — 99232 SBSQ HOSP IP/OBS MODERATE 35: CPT | Performed by: INTERNAL MEDICINE

## 2025-02-16 PROCEDURE — 82948 REAGENT STRIP/BLOOD GLUCOSE: CPT

## 2025-02-16 RX ORDER — PANTOPRAZOLE SODIUM 40 MG/10ML
40 INJECTION, POWDER, LYOPHILIZED, FOR SOLUTION INTRAVENOUS ONCE
Status: COMPLETED | OUTPATIENT
Start: 2025-02-16 | End: 2025-02-16

## 2025-02-16 RX ORDER — SODIUM CHLORIDE, SODIUM GLUCONATE, SODIUM ACETATE, POTASSIUM CHLORIDE, MAGNESIUM CHLORIDE, SODIUM PHOSPHATE, DIBASIC, AND POTASSIUM PHOSPHATE .53; .5; .37; .037; .03; .012; .00082 G/100ML; G/100ML; G/100ML; G/100ML; G/100ML; G/100ML; G/100ML
75 INJECTION, SOLUTION INTRAVENOUS CONTINUOUS
Status: DISCONTINUED | OUTPATIENT
Start: 2025-02-16 | End: 2025-02-17

## 2025-02-16 RX ORDER — KETOROLAC TROMETHAMINE 30 MG/ML
15 INJECTION, SOLUTION INTRAMUSCULAR; INTRAVENOUS ONCE
Status: COMPLETED | OUTPATIENT
Start: 2025-02-16 | End: 2025-02-16

## 2025-02-16 RX ADMIN — INSULIN LISPRO 1 UNITS: 100 INJECTION, SOLUTION INTRAVENOUS; SUBCUTANEOUS at 08:12

## 2025-02-16 RX ADMIN — ACETAMINOPHEN 325MG 650 MG: 325 TABLET ORAL at 01:21

## 2025-02-16 RX ADMIN — ATORVASTATIN CALCIUM 80 MG: 80 TABLET, FILM COATED ORAL at 16:48

## 2025-02-16 RX ADMIN — INSULIN LISPRO 1 UNITS: 100 INJECTION, SOLUTION INTRAVENOUS; SUBCUTANEOUS at 21:11

## 2025-02-16 RX ADMIN — LATANOPROST 1 DROP: 50 SOLUTION OPHTHALMIC at 21:12

## 2025-02-16 RX ADMIN — GABAPENTIN 300 MG: 300 CAPSULE ORAL at 17:00

## 2025-02-16 RX ADMIN — BUDESONIDE AND FORMOTEROL FUMARATE DIHYDRATE 2 PUFF: 160; 4.5 AEROSOL RESPIRATORY (INHALATION) at 17:00

## 2025-02-16 RX ADMIN — PANTOPRAZOLE SODIUM 40 MG: 40 TABLET, DELAYED RELEASE ORAL at 05:29

## 2025-02-16 RX ADMIN — OXYCODONE 5 MG: 5 TABLET ORAL at 02:25

## 2025-02-16 RX ADMIN — ONDANSETRON 4 MG: 2 INJECTION INTRAMUSCULAR; INTRAVENOUS at 11:43

## 2025-02-16 RX ADMIN — BACLOFEN 5 MG: 10 TABLET ORAL at 16:48

## 2025-02-16 RX ADMIN — KETOROLAC TROMETHAMINE 15 MG: 30 INJECTION, SOLUTION INTRAMUSCULAR; INTRAVENOUS at 03:31

## 2025-02-16 RX ADMIN — SENNOSIDES 17.2 MG: 8.6 TABLET ORAL at 21:07

## 2025-02-16 RX ADMIN — GABAPENTIN 600 MG: 300 CAPSULE ORAL at 21:07

## 2025-02-16 RX ADMIN — ONDANSETRON 4 MG: 2 INJECTION INTRAMUSCULAR; INTRAVENOUS at 07:06

## 2025-02-16 RX ADMIN — BACLOFEN 5 MG: 10 TABLET ORAL at 21:07

## 2025-02-16 RX ADMIN — MIRTAZAPINE 7.5 MG: 7.5 TABLET, FILM COATED ORAL at 21:07

## 2025-02-16 RX ADMIN — INSULIN LISPRO 1 UNITS: 100 INJECTION, SOLUTION INTRAVENOUS; SUBCUTANEOUS at 11:43

## 2025-02-16 RX ADMIN — SODIUM CHLORIDE, SODIUM GLUCONATE, SODIUM ACETATE, POTASSIUM CHLORIDE, MAGNESIUM CHLORIDE, SODIUM PHOSPHATE, DIBASIC, AND POTASSIUM PHOSPHATE 75 ML/HR: .53; .5; .37; .037; .03; .012; .00082 INJECTION, SOLUTION INTRAVENOUS at 11:43

## 2025-02-16 RX ADMIN — PANTOPRAZOLE SODIUM 40 MG: 40 INJECTION, POWDER, FOR SOLUTION INTRAVENOUS at 11:43

## 2025-02-16 RX ADMIN — NYSTATIN: 100000 POWDER TOPICAL at 17:00

## 2025-02-16 RX ADMIN — NYSTATIN: 100000 POWDER TOPICAL at 09:07

## 2025-02-16 NOTE — PROGRESS NOTES
Progress Note - Hospitalist   Name: Mathew Rico 74 y.o. male I MRN: [id number]  Unit/Bed#: [facility] [address] -01 I Date of Admission: 2/10/2025   Date of Service: 2/16/2025 I Hospital Day: 6    Assessment & Plan  Severe sepsis (HCC)  Meeting criteria with tachycardia, tachypnea, leukocytosis, elevated lactic acid  Suspected secondary to UTI  History of MDRO  Urine cultures with enteric coccus sensitive to ertapenem  Completed 3-day course of ertapenem  No further management necessary  History of CVA (cerebrovascular accident)  Continue PTA Plavix and statin  UTI (urinary tract infection)  Hx MDRO Enterobacter cloacea   Suprapubic catheter exchanged in the emergency department  Management as above  Type 2 diabetes mellitus without complication, without long-term current use of insulin (HCC)  Continue sliding scale insulin  Resume oral regimen on discharge  Multiple sclerosis (HCC)  Hx MS, neurogenic bladder  Continue PTA baclofen and gabapentin  [facility] by [facility], family wishes for patient to go to snf on discharge  Case management following   Urinary retention  Neurogenic bladder  SPT in place  Outpatient urology follow-up  Nausea and vomiting  Patient had several episodes of nausea and vomiting this morning  Unable to tolerate oral meds, will give dose of IV Protonix  Will monitor today for further events.  Given the number of norovirus cases in the community and in the hospital, we will have to keep this in mind  Supportive care  IV fluids    VTE Pharmacologic Prophylaxis: lovenox     Patient Centered Rounds:  Patient care rounds were performed with nursing    Education and Discussions with Family / Patient: Patient     Time Spent for Care: I have spent a total time of 40 minutes on 02/16/25 in caring for this patient including Diagnostic results, Risks and benefits of tx options, Instructions for management, Impressions, Counseling / Coordination of care, Documenting in the medical record, and Communicating  with other healthcare professionals .      Current Length of Stay: 6 day(s)    Current Patient Status: Inpatient   Certification Statement: The patient will continue to require additional inpatient hospital stay due to management of poor po intake     Discharge Plan: If patient tolerating oral intake okay for dc     Code Status: Level 1 - Full Code      Subjective:   Patient seen and evaluated at bedside. 2 episodes of vomiting this morning per nursing. Appears pale and less awake today.     Objective:     Vitals:   Temp (24hrs), Av.1 °F (36.7 °C), Min:97.7 °F (36.5 °C), Max:98.4 °F (36.9 °C)    Temp:  [97.7 °F (36.5 °C)-98.4 °F (36.9 °C)] 98.4 °F (36.9 °C)  HR:  [] 103  Resp:  [18] 18  BP: (129-170)/(48-89) 136/80  SpO2:  [91 %-98 %] 91 %  Body mass index is 24.26 kg/m².     Input and Output Summary (last 24 hours):       Intake/Output Summary (Last 24 hours) at 2025 1149  Last data filed at 2025 0939  Gross per 24 hour   Intake 840 ml   Output 1152 ml   Net -312 ml       Physical Exam:     Physical Exam  Vitals reviewed.   Constitutional:       General: He is not in acute distress.     Appearance: He is well-developed. He is ill-appearing. He is not toxic-appearing or diaphoretic.   HENT:      Head: Normocephalic and atraumatic.      Mouth/Throat:      Mouth: Mucous membranes are moist.   Eyes:      General: No scleral icterus.     Extraocular Movements: Extraocular movements intact.   Cardiovascular:      Rate and Rhythm: Normal rate and regular rhythm.      Heart sounds: Normal heart sounds.   Pulmonary:      Effort: Pulmonary effort is normal. No respiratory distress.      Breath sounds: Normal breath sounds. No wheezing or rales.   Abdominal:      General: There is no distension.      Palpations: Abdomen is soft.      Tenderness: There is no abdominal tenderness. There is no guarding or rebound.   Musculoskeletal:         General: No swelling, tenderness or deformity.   Skin:     General:  Skin is warm and dry.      Coloration: Skin is pale.   Neurological:      General: No focal deficit present.      Mental Status: He is alert. Mental status is at baseline.   Psychiatric:         Mood and Affect: Mood normal.         Behavior: Behavior normal.         Thought Content: Thought content normal.         Judgment: Judgment normal.         Additional Data:     Labs: I have reviewed pertinent results     Results from last 7 days   Lab Units 02/14/25  0646 02/13/25  0456 02/12/25  0508   WBC Thousand/uL 9.53   < > 9.99   HEMOGLOBIN g/dL 12.6   < > 13.1   HEMATOCRIT % 39.7   < > 43.4   PLATELETS Thousands/uL 267   < > 269   SEGS PCT %  --   --  58   LYMPHO PCT %  --   --  29   MONO PCT %  --   --  8   EOS PCT %  --   --  4    < > = values in this interval not displayed.     Results from last 7 days   Lab Units 02/14/25  0646 02/11/25  0516 02/10/25  1846   SODIUM mmol/L 138   < > 139   POTASSIUM mmol/L 3.9   < > 4.2   CHLORIDE mmol/L 105   < > 106   CO2 mmol/L 25   < > 23   BUN mg/dL 12   < > 17   CREATININE mg/dL 0.83   < > 0.84   ANION GAP mmol/L 8   < > 10   CALCIUM mg/dL 9.4   < > 10.2   ALBUMIN g/dL  --   --  4.0   TOTAL BILIRUBIN mg/dL  --   --  0.34   ALK PHOS U/L  --   --  75   ALT U/L  --   --  6*   AST U/L  --   --  11*   GLUCOSE RANDOM mg/dL 122   < > 87    < > = values in this interval not displayed.     Results from last 7 days   Lab Units 02/10/25  1932   INR  0.94     Results from last 7 days   Lab Units 02/16/25  1114 02/16/25  0745 02/15/25  2101 02/15/25  1612 02/15/25  1138 02/15/25  0611 02/14/25  2019 02/14/25  1606 02/14/25  1153 02/14/25  0632 02/13/25  2022 02/13/25  1638   POC GLUCOSE mg/dl 167* 182* 187* 160* 225* 160* 298* 157* 186* 139 188* 143*     Results from last 7 days   Lab Units 02/10/25  1846   HEMOGLOBIN A1C % 6.3*     Results from last 7 days   Lab Units 02/11/25  0404 02/11/25  0027 02/10/25  2214 02/10/25  1954   LACTIC ACID mmol/L 2.1* 2.8* 2.2*  --    PROCALCITONIN  ng/ml  --   --   --  <0.05         Imaging: I have reviewed pertinent imaging       Recent Cultures (last 7 days):     Results from last 7 days   Lab Units 02/10/25  2131 02/10/25  1955 02/10/25  1954   BLOOD CULTURE   --  No Growth After 5 Days. No Growth After 5 Days.   URINE CULTURE  >100,000 cfu/ml Enterobacter cloacae*  50,000-59,000 cfu/ml  --   --        Last 24 Hours Medication List:   Current Facility-Administered Medications   Medication Dose Route Frequency Provider Last Rate    acetaminophen  650 mg Oral Q6H PRN Petra S Rodney, CRNP      aluminum-magnesium hydroxide-simethicone  30 mL Oral Q4H PRN Steve Shook DO      amLODIPine  10 mg Oral Daily Petra S Rodney, CRNP      atorvastatin  80 mg Oral Daily With Dinner Petra S Rodney, CRNP      baclofen  5 mg Oral TID Petra S Rodney, CRNP      budesonide-formoterol  2 puff Inhalation BID Petra S Rodney, CRNP      clopidogrel  75 mg Oral Daily Petra S Rodney, CRNP      cyanocobalamin  500 mcg Oral Daily Petra S Rodney, CRNP      enoxaparin  40 mg Subcutaneous Daily Petra S Rodney, CRNP      gabapentin  300 mg Oral BID Fernando Lermamarlene Pulido, CRNP      gabapentin  600 mg Oral HS Petra S Rodney, CRNP      insulin lispro  1-5 Units Subcutaneous TID AC Petra S Rodney, CRNP      insulin lispro  1-5 Units Subcutaneous HS Petra S Rodney, CRNP      latanoprost  1 drop Both Eyes HS Petra S Rodney, CRNP      melatonin  3 mg Oral HS PRN Petra S Rodney, CRNP      mirtazapine  7.5 mg Oral HS Petra S Rodney, CRNP      multi-electrolyte  75 mL/hr Intravenous Continuous Steve Shook DO 75 mL/hr (02/16/25 1143)    nystatin   Topical BID Steve Shook DO      ondansetron  4 mg Intravenous Q4H PRN Steve Shook DO      oxyCODONE  5 mg Oral Q6H PRN Petra S Rodney, CRNP      pantoprazole  40 mg Oral Early Morning Steve Shook,       polyethylene glycol  17 g Oral Daily Petra S Rodney, CRNP      propranolol  60 mg Oral Daily Steve Shook DO      senna  2 tablet  Oral HS SHA Heck          Today, Patient Was Seen By: Steve Shook DO    ** Please Note: Dictation voice to text software may have been used in the creation of this document. **

## 2025-02-16 NOTE — PLAN OF CARE
Problem: Potential for Falls  Goal: Patient will remain free of falls  Description: INTERVENTIONS:  - Educate patient/family on patient safety including physical limitations  - Instruct patient to call for assistance with activity   - Consult OT/PT to assist with strengthening/mobility   - Keep Call bell within reach  - Keep bed low and locked with side rails adjusted as appropriate  - Keep care items and personal belongings within reach  - Initiate and maintain comfort rounds  - Make Fall Risk Sign visible to staff  - Offer Toileting every 2 Hours, in advance of need  - Initiate/Maintain bed alarm  - Apply yellow socks and bracelet for high fall risk patients  - Consider moving patient to room near nurses station  Outcome: Progressing     Problem: PAIN - ADULT  Goal: Verbalizes/displays adequate comfort level or baseline comfort level  Description: Interventions:  - Encourage patient to monitor pain and request assistance  - Assess pain using appropriate pain scale  - Administer analgesics based on type and severity of pain and evaluate response  - Implement non-pharmacological measures as appropriate and evaluate response  - Consider cultural and social influences on pain and pain management  - Notify physician/advanced practitioner if interventions unsuccessful or patient reports new pain  Outcome: Progressing     Problem: INFECTION - ADULT  Goal: Absence or prevention of progression during hospitalization  Description: INTERVENTIONS:  - Assess and monitor for signs and symptoms of infection  - Monitor lab/diagnostic results  - Monitor all insertion sites, i.e. indwelling lines, tubes, and drains  - Monitor endotracheal if appropriate and nasal secretions for changes in amount and color  - [place] appropriate cooling/warming therapies per order  - Administer medications as ordered  - Instruct and encourage patient and family to use good hand hygiene technique  - Identify and instruct in appropriate isolation  precautions for identified infection/condition  Outcome: Progressing     Problem: DISCHARGE PLANNING  Goal: Discharge to home or other facility with appropriate resources  Description: INTERVENTIONS:  - Identify barriers to discharge w/patient and caregiver  - Arrange for needed discharge resources and transportation as appropriate  - Identify discharge learning needs (meds, wound care, etc.)  - Arrange for interpretive services to assist at discharge as needed  - Refer to Case Management Department for coordinating discharge planning if the patient needs post-hospital services based on physician/advanced practitioner order or complex needs related to functional status, cognitive ability, or social support system  Outcome: Progressing     Problem: Knowledge Deficit  Goal: Patient/family/caregiver demonstrates understanding of disease process, treatment plan, medications, and discharge instructions  Description: Complete learning assessment and assess knowledge base.  Interventions:  - Provide teaching at level of understanding  - Provide teaching via preferred learning methods  Outcome: Progressing     Problem: GASTROINTESTINAL - ADULT  Goal: Minimal or absence of nausea and/or vomiting  Description: INTERVENTIONS:  - Administer IV fluids if ordered to ensure adequate hydration  - Maintain NPO status until nausea and vomiting are resolved  - Nasogastric tube if ordered  - Administer ordered antiemetic medications as needed  - Provide nonpharmacologic comfort measures as appropriate  - Advance diet as tolerated, if ordered  - Consider nutrition services referral to assist patient with adequate nutrition and appropriate food choices  Outcome: Progressing  Goal: Maintains or returns to baseline bowel function  Description: INTERVENTIONS:  - Assess bowel function  - Encourage oral fluids to ensure adequate hydration  - Administer IV fluids if ordered to ensure adequate hydration  - Administer ordered medications as  needed  - Encourage mobilization and activity  - Consider nutritional services referral to assist patient with adequate nutrition and appropriate food choices  Outcome: Progressing  Goal: Maintains adequate nutritional intake  Description: INTERVENTIONS:  - Monitor percentage of each meal consumed  - Identify factors contributing to decreased intake, treat as appropriate  - Assist with meals as needed  - Monitor I&O, weight, and lab values if indicated  - Obtain nutrition services referral as needed  Outcome: Progressing     Problem: GENITOURINARY - ADULT  Goal: Maintains or returns to baseline urinary function  Description: INTERVENTIONS:  - Assess urinary function  - Encourage oral fluids to ensure adequate hydration if ordered  - Administer IV fluids as ordered to ensure adequate hydration  - Administer ordered medications as needed  - Offer frequent toileting  - Follow urinary retention protocol if ordered  Outcome: Progressing  Goal: Absence of urinary retention  Description: INTERVENTIONS:  - Assess patient's ability to void and empty bladder  - Monitor I/O  - Bladder scan as needed  - Discuss with physician/AP medications to alleviate retention as needed  - Discuss catheterization for long term situations as appropriate  Outcome: Progressing  Goal: Urinary catheter remains patent  Description: INTERVENTIONS:  - Assess patency of urinary catheter  - If patient has a chronic dela cruz, consider changing catheter if non-functioning  - Follow guidelines for intermittent irrigation of non-functioning urinary catheter  Outcome: Progressing     Problem: METABOLIC, FLUID AND ELECTROLYTES - ADULT  Goal: Electrolytes maintained within normal limits  Description: INTERVENTIONS:  - Monitor labs and assess patient for signs and symptoms of electrolyte imbalances  - Administer electrolyte replacement as ordered  - Monitor response to electrolyte replacements, including repeat lab results as appropriate  - Instruct patient on  fluid and nutrition as appropriate  Outcome: Progressing  Goal: Fluid balance maintained  Description: INTERVENTIONS:  - Monitor labs   - Monitor I/O and WT  - Instruct patient on fluid and nutrition as appropriate  - Assess for signs & symptoms of volume excess or deficit  Outcome: Progressing  Goal: Glucose maintained within target range  Description: INTERVENTIONS:  - Monitor Blood Glucose as ordered  - Assess for signs and symptoms of hyperglycemia and hypoglycemia  - Administer ordered medications to maintain glucose within target range  - Assess nutritional intake and initiate nutrition service referral as needed  Outcome: Progressing     Problem: Prexisting or High Potential for Compromised Skin Integrity  Goal: Skin integrity is maintained or improved  Description: INTERVENTIONS:  - Identify patients at risk for skin breakdown  - Assess and monitor skin integrity  - Assess and monitor nutrition and hydration status  - Monitor labs   - Assess for incontinence   - Turn and reposition patient  - Assist with mobility/ambulation  - Relieve pressure over bony prominences  - Avoid friction and shearing  - Provide appropriate hygiene as needed including keeping skin clean and dry  - Evaluate need for skin moisturizer/barrier cream  - Collaborate with interdisciplinary team   - Patient/family teaching  - Consider wound care consult   Outcome: Progressing     Problem: Nutrition/Hydration-ADULT  Goal: Nutrient/Hydration intake appropriate for improving, restoring or maintaining nutritional needs  Description: Monitor and assess patient's nutrition/hydration status for malnutrition. Collaborate with interdisciplinary team and initiate plan and interventions as ordered.  Monitor patient's weight and dietary intake as ordered or per policy. Utilize nutrition screening tool and intervene as necessary. Determine patient's food preferences and provide high-protein, high-caloric foods as appropriate.     INTERVENTIONS:  -  Monitor oral intake, urinary output, labs, and treatment plans  - Assess nutrition and hydration status and recommend course of action  - Evaluate amount of meals eaten  - Assist patient with eating if necessary   - Allow adequate time for meals  - Recommend/ encourage appropriate diets, oral nutritional supplements, and vitamin/mineral supplements  - Order, calculate, and assess calorie counts as needed  - Recommend, monitor, and adjust tube feedings and TPN/PPN based on assessed needs  - Assess need for intravenous fluids  - Provide specific nutrition/hydration education as appropriate  - Include patient/family/caregiver in decisions related to nutrition  Outcome: Progressing

## 2025-02-16 NOTE — ASSESSMENT & PLAN NOTE
Patient had several episodes of nausea and vomiting this morning  Unable to tolerate oral meds, will give dose of IV Protonix  Will monitor today for further events.  Given the number of norovirus cases in the community and in the hospital, we will have to keep this in mind  Supportive care  IV fluids

## 2025-02-16 NOTE — ASSESSMENT & PLAN NOTE
Meeting criteria with tachycardia, tachypnea, leukocytosis, elevated lactic acid  Suspected secondary to UTI  History of MDRO  Urine cultures with enteric coccus sensitive to ertapenem  Completed 3-day course of ertapenem  No further management necessary

## 2025-02-16 NOTE — PLAN OF CARE
Problem: Potential for Falls  Goal: Patient will remain free of falls  Description: INTERVENTIONS:  - Educate patient/family on patient safety including physical limitations  - Instruct patient to call for assistance with activity   - Consult OT/PT to assist with strengthening/mobility   - Keep Call bell within reach  - Keep bed low and locked with side rails adjusted as appropriate  - Keep care items and personal belongings within reach  - Initiate and maintain comfort rounds  - Make Fall Risk Sign visible to staff  - Offer Toileting every 2 Hours, in advance of need  - Initiate/Maintain bed alarm  - Obtain necessary fall risk management equipment: yellow socks  - Apply yellow socks and bracelet for high fall risk patients  - Consider moving patient to room near nurses station  Outcome: Progressing     Problem: PAIN - ADULT  Goal: Verbalizes/displays adequate comfort level or baseline comfort level  Description: Interventions:  - Encourage patient to monitor pain and request assistance  - Assess pain using appropriate pain scale  - Administer analgesics based on type and severity of pain and evaluate response  - Implement non-pharmacological measures as appropriate and evaluate response  - Consider cultural and social influences on pain and pain management  - Notify physician/advanced practitioner if interventions unsuccessful or patient reports new pain  Outcome: Progressing     Problem: INFECTION - ADULT  Goal: Absence or prevention of progression during hospitalization  Description: INTERVENTIONS:  - Assess and monitor for signs and symptoms of infection  - Monitor lab/diagnostic results  - Monitor all insertion sites, i.e. indwelling lines, tubes, and drains  - Monitor endotracheal if appropriate and nasal secretions for changes in amount and color  - [place] appropriate cooling/warming therapies per order  - Administer medications as ordered  - Instruct and encourage patient and family to use good hand  hygiene technique  - Identify and instruct in appropriate isolation precautions for identified infection/condition  Outcome: Progressing     Problem: DISCHARGE PLANNING  Goal: Discharge to home or other facility with appropriate resources  Description: INTERVENTIONS:  - Identify barriers to discharge w/patient and caregiver  - Arrange for needed discharge resources and transportation as appropriate  - Identify discharge learning needs (meds, wound care, etc.)  - Arrange for interpretive services to assist at discharge as needed  - Refer to Case Management Department for coordinating discharge planning if the patient needs post-hospital services based on physician/advanced practitioner order or complex needs related to functional status, cognitive ability, or social support system  Outcome: Progressing     Problem: Knowledge Deficit  Goal: Patient/family/caregiver demonstrates understanding of disease process, treatment plan, medications, and discharge instructions  Description: Complete learning assessment and assess knowledge base.  Interventions:  - Provide teaching at level of understanding  - Provide teaching via preferred learning methods  Outcome: Progressing     Problem: GASTROINTESTINAL - ADULT  Goal: Minimal or absence of nausea and/or vomiting  Description: INTERVENTIONS:  - Administer IV fluids if ordered to ensure adequate hydration  - Maintain NPO status until nausea and vomiting are resolved  - Nasogastric tube if ordered  - Administer ordered antiemetic medications as needed  - Provide nonpharmacologic comfort measures as appropriate  - Advance diet as tolerated, if ordered  - Consider nutrition services referral to assist patient with adequate nutrition and appropriate food choices  Outcome: Progressing  Goal: Maintains or returns to baseline bowel function  Description: INTERVENTIONS:  - Assess bowel function  - Encourage oral fluids to ensure adequate hydration  - Administer IV fluids if ordered to  ensure adequate hydration  - Administer ordered medications as needed  - Encourage mobilization and activity  - Consider nutritional services referral to assist patient with adequate nutrition and appropriate food choices  Outcome: Progressing  Goal: Maintains adequate nutritional intake  Description: INTERVENTIONS:  - Monitor percentage of each meal consumed  - Identify factors contributing to decreased intake, treat as appropriate  - Assist with meals as needed  - Monitor I&O, weight, and lab values if indicated  - Obtain nutrition services referral as needed  Outcome: Progressing     Problem: GENITOURINARY - ADULT  Goal: Maintains or returns to baseline urinary function  Description: INTERVENTIONS:  - Assess urinary function  - Encourage oral fluids to ensure adequate hydration if ordered  - Administer IV fluids as ordered to ensure adequate hydration  - Administer ordered medications as needed  - Offer frequent toileting  - Follow urinary retention protocol if ordered  Outcome: Progressing  Goal: Absence of urinary retention  Description: INTERVENTIONS:  - Assess patient’s ability to void and empty bladder  - Monitor I/O  - Bladder scan as needed  - Discuss with physician/AP medications to alleviate retention as needed  - Discuss catheterization for long term situations as appropriate  Outcome: Progressing  Goal: Urinary catheter remains patent  Description: INTERVENTIONS:  - Assess patency of urinary catheter  - If patient has a chronic dela cruz, consider changing catheter if non-functioning  - Follow guidelines for intermittent irrigation of non-functioning urinary catheter  Outcome: Progressing     Problem: METABOLIC, FLUID AND ELECTROLYTES - ADULT  Goal: Electrolytes maintained within normal limits  Description: INTERVENTIONS:  - Monitor labs and assess patient for signs and symptoms of electrolyte imbalances  - Administer electrolyte replacement as ordered  - Monitor response to electrolyte replacements,  including repeat lab results as appropriate  - Instruct patient on fluid and nutrition as appropriate  Outcome: Progressing  Goal: Fluid balance maintained  Description: INTERVENTIONS:  - Monitor labs   - Monitor I/O and WT  - Instruct patient on fluid and nutrition as appropriate  - Assess for signs & symptoms of volume excess or deficit  Outcome: Progressing  Goal: Glucose maintained within target range  Description: INTERVENTIONS:  - Monitor Blood Glucose as ordered  - Assess for signs and symptoms of hyperglycemia and hypoglycemia  - Administer ordered medications to maintain glucose within target range  - Assess nutritional intake and initiate nutrition service referral as needed  Outcome: Progressing     Problem: Prexisting or High Potential for Compromised Skin Integrity  Goal: Skin integrity is maintained or improved  Description: INTERVENTIONS:  - Identify patients at risk for skin breakdown  - Assess and monitor skin integrity  - Assess and monitor nutrition and hydration status  - Monitor labs   - Assess for incontinence   - Turn and reposition patient  - Assist with mobility/ambulation  - Relieve pressure over bony prominences  - Avoid friction and shearing  - Provide appropriate hygiene as needed including keeping skin clean and dry  - Evaluate need for skin moisturizer/barrier cream  - Collaborate with interdisciplinary team   - Patient/family teaching  - Consider wound care consult   Outcome: Progressing     Problem: Nutrition/Hydration-ADULT  Goal: Nutrient/Hydration intake appropriate for improving, restoring or maintaining nutritional needs  Description: Monitor and assess patient's nutrition/hydration status for malnutrition. Collaborate with interdisciplinary team and initiate plan and interventions as ordered.  Monitor patient's weight and dietary intake as ordered or per policy. Utilize nutrition screening tool and intervene as necessary. Determine patient's food preferences and provide  high-protein, high-caloric foods as appropriate.     INTERVENTIONS:  - Monitor oral intake, urinary output, labs, and treatment plans  - Assess nutrition and hydration status and recommend course of action  - Evaluate amount of meals eaten  - Assist patient with eating if necessary   - Allow adequate time for meals  - Recommend/ encourage appropriate diets, oral nutritional supplements, and vitamin/mineral supplements  - Order, calculate, and assess calorie counts as needed  - Recommend, monitor, and adjust tube feedings and TPN/PPN based on assessed needs  - Assess need for intravenous fluids  - Provide specific nutrition/hydration education as appropriate  - Include patient/family/caregiver in decisions related to nutrition  Outcome: Progressing

## 2025-02-17 LAB
ANION GAP SERPL CALCULATED.3IONS-SCNC: 8 MMOL/L (ref 4–13)
BUN SERPL-MCNC: 16 MG/DL (ref 5–25)
CALCIUM SERPL-MCNC: 9.1 MG/DL (ref 8.4–10.2)
CHLORIDE SERPL-SCNC: 105 MMOL/L (ref 96–108)
CO2 SERPL-SCNC: 24 MMOL/L (ref 21–32)
CREAT SERPL-MCNC: 0.84 MG/DL (ref 0.6–1.3)
ERYTHROCYTE [DISTWIDTH] IN BLOOD BY AUTOMATED COUNT: 16.6 % (ref 11.6–15.1)
GFR SERPL CREATININE-BSD FRML MDRD: 85 ML/MIN/1.73SQ M
GLUCOSE SERPL-MCNC: 108 MG/DL (ref 65–140)
GLUCOSE SERPL-MCNC: 123 MG/DL (ref 65–140)
GLUCOSE SERPL-MCNC: 143 MG/DL (ref 65–140)
GLUCOSE SERPL-MCNC: 153 MG/DL (ref 65–140)
GLUCOSE SERPL-MCNC: 168 MG/DL (ref 65–140)
HCT VFR BLD AUTO: 40.9 % (ref 36.5–49.3)
HGB BLD-MCNC: 12.5 G/DL (ref 12–17)
MAGNESIUM SERPL-MCNC: 1.6 MG/DL (ref 1.9–2.7)
MCH RBC QN AUTO: 26.9 PG (ref 26.8–34.3)
MCHC RBC AUTO-ENTMCNC: 30.6 G/DL (ref 31.4–37.4)
MCV RBC AUTO: 88 FL (ref 82–98)
PLATELET # BLD AUTO: 300 THOUSANDS/UL (ref 149–390)
PMV BLD AUTO: 8.7 FL (ref 8.9–12.7)
POTASSIUM SERPL-SCNC: 3.8 MMOL/L (ref 3.5–5.3)
RBC # BLD AUTO: 4.65 MILLION/UL (ref 3.88–5.62)
SODIUM SERPL-SCNC: 137 MMOL/L (ref 135–147)
WBC # BLD AUTO: 6.91 THOUSAND/UL (ref 4.31–10.16)

## 2025-02-17 PROCEDURE — 99232 SBSQ HOSP IP/OBS MODERATE 35: CPT | Performed by: FAMILY MEDICINE

## 2025-02-17 PROCEDURE — 83735 ASSAY OF MAGNESIUM: CPT | Performed by: INTERNAL MEDICINE

## 2025-02-17 PROCEDURE — 82948 REAGENT STRIP/BLOOD GLUCOSE: CPT

## 2025-02-17 PROCEDURE — 85027 COMPLETE CBC AUTOMATED: CPT | Performed by: INTERNAL MEDICINE

## 2025-02-17 PROCEDURE — 80048 BASIC METABOLIC PNL TOTAL CA: CPT | Performed by: INTERNAL MEDICINE

## 2025-02-17 RX ADMIN — MIRTAZAPINE 7.5 MG: 7.5 TABLET, FILM COATED ORAL at 21:46

## 2025-02-17 RX ADMIN — AMLODIPINE BESYLATE 10 MG: 10 TABLET ORAL at 09:58

## 2025-02-17 RX ADMIN — CLOPIDOGREL BISULFATE 75 MG: 75 TABLET ORAL at 09:57

## 2025-02-17 RX ADMIN — POLYETHYLENE GLYCOL 3350 17 G: 17 POWDER, FOR SOLUTION ORAL at 09:58

## 2025-02-17 RX ADMIN — BACLOFEN 5 MG: 10 TABLET ORAL at 21:45

## 2025-02-17 RX ADMIN — GABAPENTIN 600 MG: 300 CAPSULE ORAL at 21:45

## 2025-02-17 RX ADMIN — BUDESONIDE AND FORMOTEROL FUMARATE DIHYDRATE 2 PUFF: 160; 4.5 AEROSOL RESPIRATORY (INHALATION) at 09:58

## 2025-02-17 RX ADMIN — ENOXAPARIN SODIUM 40 MG: 40 INJECTION SUBCUTANEOUS at 09:57

## 2025-02-17 RX ADMIN — NYSTATIN: 100000 POWDER TOPICAL at 17:28

## 2025-02-17 RX ADMIN — INSULIN LISPRO 1 UNITS: 100 INJECTION, SOLUTION INTRAVENOUS; SUBCUTANEOUS at 13:30

## 2025-02-17 RX ADMIN — ONDANSETRON 4 MG: 2 INJECTION INTRAMUSCULAR; INTRAVENOUS at 00:56

## 2025-02-17 RX ADMIN — LATANOPROST 1 DROP: 50 SOLUTION OPHTHALMIC at 21:49

## 2025-02-17 RX ADMIN — BUDESONIDE AND FORMOTEROL FUMARATE DIHYDRATE 2 PUFF: 160; 4.5 AEROSOL RESPIRATORY (INHALATION) at 17:28

## 2025-02-17 RX ADMIN — SODIUM CHLORIDE, SODIUM GLUCONATE, SODIUM ACETATE, POTASSIUM CHLORIDE, MAGNESIUM CHLORIDE, SODIUM PHOSPHATE, DIBASIC, AND POTASSIUM PHOSPHATE 75 ML/HR: .53; .5; .37; .037; .03; .012; .00082 INJECTION, SOLUTION INTRAVENOUS at 00:51

## 2025-02-17 RX ADMIN — BACLOFEN 5 MG: 10 TABLET ORAL at 09:58

## 2025-02-17 RX ADMIN — INSULIN LISPRO 1 UNITS: 100 INJECTION, SOLUTION INTRAVENOUS; SUBCUTANEOUS at 17:28

## 2025-02-17 RX ADMIN — BACLOFEN 5 MG: 10 TABLET ORAL at 17:00

## 2025-02-17 RX ADMIN — PROPRANOLOL HYDROCHLORIDE 60 MG: 60 CAPSULE, EXTENDED RELEASE ORAL at 09:59

## 2025-02-17 RX ADMIN — ATORVASTATIN CALCIUM 80 MG: 80 TABLET, FILM COATED ORAL at 17:28

## 2025-02-17 RX ADMIN — PANTOPRAZOLE SODIUM 40 MG: 40 TABLET, DELAYED RELEASE ORAL at 05:30

## 2025-02-17 RX ADMIN — GABAPENTIN 300 MG: 300 CAPSULE ORAL at 17:28

## 2025-02-17 RX ADMIN — Medication 500 MCG: at 09:57

## 2025-02-17 RX ADMIN — GABAPENTIN 300 MG: 300 CAPSULE ORAL at 09:57

## 2025-02-17 RX ADMIN — NYSTATIN: 100000 POWDER TOPICAL at 09:58

## 2025-02-17 NOTE — ASSESSMENT & PLAN NOTE
Patient had several episodes of nausea and vomiting this morning  Unable to tolerate oral meds, will give dose of IV Protonix  Will monitor today for further events.  Given the number of norovirus cases in the community and in the hospital, we will have to keep this in mind  Supportive care  Resolved

## 2025-02-17 NOTE — PLAN OF CARE
Problem: Potential for Falls  Goal: Patient will remain free of falls  Description: INTERVENTIONS:  - Educate patient/family on patient safety including physical limitations  - Instruct patient to call for assistance with activity   - Consult OT/PT to assist with strengthening/mobility   - Keep Call bell within reach  - Keep bed low and locked with side rails adjusted as appropriate  - Keep care items and personal belongings within reach  - Initiate and maintain comfort rounds  - Make Fall Risk Sign visible to staff  - Offer Toileting every 2 Hours, in advance of need  - Initiate/Maintain bed alarm  - Apply yellow socks and bracelet for high fall risk patients  - Consider moving patient to room near nurses station  Outcome: Progressing     Problem: PAIN - ADULT  Goal: Verbalizes/displays adequate comfort level or baseline comfort level  Description: Interventions:  - Encourage patient to monitor pain and request assistance  - Assess pain using appropriate pain scale  - Administer analgesics based on type and severity of pain and evaluate response  - Implement non-pharmacological measures as appropriate and evaluate response  - Consider cultural and social influences on pain and pain management  - Notify physician/advanced practitioner if interventions unsuccessful or patient reports new pain  Outcome: Progressing     Problem: INFECTION - ADULT  Goal: Absence or prevention of progression during hospitalization  Description: INTERVENTIONS:  - Assess and monitor for signs and symptoms of infection  - Monitor lab/diagnostic results  - Monitor all insertion sites, i.e. indwelling lines, tubes, and drains  - Monitor endotracheal if appropriate and nasal secretions for changes in amount and color  - [place] appropriate cooling/warming therapies per order  - Administer medications as ordered  - Instruct and encourage patient and family to use good hand hygiene technique  - Identify and instruct in appropriate isolation  precautions for identified infection/condition  Outcome: Progressing     Problem: DISCHARGE PLANNING  Goal: Discharge to home or other facility with appropriate resources  Description: INTERVENTIONS:  - Identify barriers to discharge w/patient and caregiver  - Arrange for needed discharge resources and transportation as appropriate  - Identify discharge learning needs (meds, wound care, etc.)  - Arrange for interpretive services to assist at discharge as needed  - Refer to Case Management Department for coordinating discharge planning if the patient needs post-hospital services based on physician/advanced practitioner order or complex needs related to functional status, cognitive ability, or social support system  Outcome: Progressing     Problem: Knowledge Deficit  Goal: Patient/family/caregiver demonstrates understanding of disease process, treatment plan, medications, and discharge instructions  Description: Complete learning assessment and assess knowledge base.  Interventions:  - Provide teaching at level of understanding  - Provide teaching via preferred learning methods  Outcome: Progressing     Problem: GASTROINTESTINAL - ADULT  Goal: Minimal or absence of nausea and/or vomiting  Description: INTERVENTIONS:  - Administer IV fluids if ordered to ensure adequate hydration  - Maintain NPO status until nausea and vomiting are resolved  - Nasogastric tube if ordered  - Administer ordered antiemetic medications as needed  - Provide nonpharmacologic comfort measures as appropriate  - Advance diet as tolerated, if ordered  - Consider nutrition services referral to assist patient with adequate nutrition and appropriate food choices  Outcome: Progressing  Goal: Maintains or returns to baseline bowel function  Description: INTERVENTIONS:  - Assess bowel function  - Encourage oral fluids to ensure adequate hydration  - Administer IV fluids if ordered to ensure adequate hydration  - Administer ordered medications as  needed  - Encourage mobilization and activity  - Consider nutritional services referral to assist patient with adequate nutrition and appropriate food choices  Outcome: Progressing  Goal: Maintains adequate nutritional intake  Description: INTERVENTIONS:  - Monitor percentage of each meal consumed  - Identify factors contributing to decreased intake, treat as appropriate  - Assist with meals as needed  - Monitor I&O, weight, and lab values if indicated  - Obtain nutrition services referral as needed  Outcome: Progressing     Problem: GENITOURINARY - ADULT  Goal: Maintains or returns to baseline urinary function  Description: INTERVENTIONS:  - Assess urinary function  - Encourage oral fluids to ensure adequate hydration if ordered  - Administer IV fluids as ordered to ensure adequate hydration  - Administer ordered medications as needed  - Offer frequent toileting  - Follow urinary retention protocol if ordered  Outcome: Progressing  Goal: Absence of urinary retention  Description: INTERVENTIONS:  - Assess patient's ability to void and empty bladder  - Monitor I/O  - Bladder scan as needed  - Discuss with physician/AP medications to alleviate retention as needed  - Discuss catheterization for long term situations as appropriate  Outcome: Progressing  Goal: Urinary catheter remains patent  Description: INTERVENTIONS:  - Assess patency of urinary catheter  - If patient has a chronic dela cruz, consider changing catheter if non-functioning  - Follow guidelines for intermittent irrigation of non-functioning urinary catheter  Outcome: Progressing     Problem: METABOLIC, FLUID AND ELECTROLYTES - ADULT  Goal: Electrolytes maintained within normal limits  Description: INTERVENTIONS:  - Monitor labs and assess patient for signs and symptoms of electrolyte imbalances  - Administer electrolyte replacement as ordered  - Monitor response to electrolyte replacements, including repeat lab results as appropriate  - Instruct patient on  fluid and nutrition as appropriate  Outcome: Progressing  Goal: Fluid balance maintained  Description: INTERVENTIONS:  - Monitor labs   - Monitor I/O and WT  - Instruct patient on fluid and nutrition as appropriate  - Assess for signs & symptoms of volume excess or deficit  Outcome: Progressing  Goal: Glucose maintained within target range  Description: INTERVENTIONS:  - Monitor Blood Glucose as ordered  - Assess for signs and symptoms of hyperglycemia and hypoglycemia  - Administer ordered medications to maintain glucose within target range  - Assess nutritional intake and initiate nutrition service referral as needed  Outcome: Progressing     Problem: Prexisting or High Potential for Compromised Skin Integrity  Goal: Skin integrity is maintained or improved  Description: INTERVENTIONS:  - Identify patients at risk for skin breakdown  - Assess and monitor skin integrity  - Assess and monitor nutrition and hydration status  - Monitor labs   - Assess for incontinence   - Turn and reposition patient  - Assist with mobility/ambulation  - Relieve pressure over bony prominences  - Avoid friction and shearing  - Provide appropriate hygiene as needed including keeping skin clean and dry  - Evaluate need for skin moisturizer/barrier cream  - Collaborate with interdisciplinary team   - Patient/family teaching  - Consider wound care consult   Outcome: Progressing     Problem: Nutrition/Hydration-ADULT  Goal: Nutrient/Hydration intake appropriate for improving, restoring or maintaining nutritional needs  Description: Monitor and assess patient's nutrition/hydration status for malnutrition. Collaborate with interdisciplinary team and initiate plan and interventions as ordered.  Monitor patient's weight and dietary intake as ordered or per policy. Utilize nutrition screening tool and intervene as necessary. Determine patient's food preferences and provide high-protein, high-caloric foods as appropriate.     INTERVENTIONS:  -  Monitor oral intake, urinary output, labs, and treatment plans  - Assess nutrition and hydration status and recommend course of action  - Evaluate amount of meals eaten  - Assist patient with eating if necessary   - Allow adequate time for meals  - Recommend/ encourage appropriate diets, oral nutritional supplements, and vitamin/mineral supplements  - Order, calculate, and assess calorie counts as needed  - Recommend, monitor, and adjust tube feedings and TPN/PPN based on assessed needs  - Assess need for intravenous fluids  - Provide specific nutrition/hydration education as appropriate  - Include patient/family/caregiver in decisions related to nutrition  Outcome: Progressing

## 2025-02-17 NOTE — ASSESSMENT & PLAN NOTE
Meeting criteria with tachycardia, tachypnea, leukocytosis, elevated lactic acid  Suspected secondary to UTI  History of MDRO  Urine cultures with enteric coccus sensitive to ertapenem  Completed 3-day course of ertapenem  No further management necessary  Currently medically stable for discharge, CM following

## 2025-02-17 NOTE — PROGRESS NOTES
Progress Note - Hospitalist   Name: Mathew Rico 74 y.o. male I MRN: [id number]  Unit/Bed#: [place] [address] -01 I Date of Admission: 2/10/2025   Date of Service: 2/17/2025 I Hospital Day: 7    Assessment & Plan  Severe sepsis (HCC)  Meeting criteria with tachycardia, tachypnea, leukocytosis, elevated lactic acid  Suspected secondary to UTI  History of MDRO  Urine cultures with enteric coccus sensitive to ertapenem  Completed 3-day course of ertapenem  No further management necessary  Currently medically stable for discharge, CM following  History of CVA (cerebrovascular accident)  Continue PTA Plavix and statin  UTI (urinary tract infection)  Hx MDRO Enterobacter cloacea   Suprapubic catheter exchanged in the emergency department  Management as above  Type 2 diabetes mellitus without complication, without long-term current use of insulin (HCC)  Continue sliding scale insulin  Resume oral regimen on discharge  Multiple sclerosis (HCC)  Hx MS, neurogenic bladder  Continue PTA baclofen and gabapentin  Managed by [facility], family wishes for patient to go to snf on discharge  Case management following   Urinary retention  Neurogenic bladder  SPT in place  Outpatient urology follow-up  Nausea and vomiting  Patient had several episodes of nausea and vomiting this morning  Unable to tolerate oral meds, will give dose of IV Protonix  Will monitor today for further events.  Given the number of norovirus cases in the community and in the hospital, we will have to keep this in mind  Supportive care  Resolved     VTE Pharmacologic Prophylaxis:   Moderate Risk (Score 3-4) - Pharmacological DVT Prophylaxis Ordered: enoxaparin (Lovenox).    Mobility:   Basic Mobility Inpatient Raw Score: 8  JH-HLM Goal: 3: Sit at edge of bed  JH-HLM Achieved: 3: Sit at edge of bed  JH-HLM Goal achieved. Continue to encourage appropriate mobility.    Patient Centered Rounds: I performed bedside rounds with nursing staff today.   Education and  Discussions with Family / Patient: Updated  (brother) via phone.    Current Length of Stay: 7 day(s)  Current Patient Status: Inpatient   Certification Statement: The patient will continue to require additional inpatient hospital stay due to medically stable for discharge  Discharge Plan: Anticipate discharge later today or tomorrow to rehab facility.    Code Status: Level 1 - Full Code    Subjective   Patient sitting up, reports nausea and vomiting is resolved.  No further vomiting today.  Eating and drinking well without any nausea.  No more abdominal pain either    Objective :  Temp:  [98.8 °F (37.1 °C)-99.8 °F (37.7 °C)] 99.2 °F (37.3 °C)  HR:  [80-94] 80  BP: (128-134)/(64-80) 128/64  Resp:  [19-20] 20  SpO2:  [93 %-94 %] 93 %  O2 Device: None (Room air)    Body mass index is 24.26 kg/m².     Input and Output Summary (last 24 hours):     Intake/Output Summary (Last 24 hours) at 2/17/2025 1258  Last data filed at 2/17/2025 0930  Gross per 24 hour   Intake 2065 ml   Output --   Net 2065 ml       Physical Exam  Vitals and nursing note reviewed.   Constitutional:       General: He is not in acute distress.     Appearance: Normal appearance.   HENT:      Head: Normocephalic and atraumatic.      Right Ear: External ear normal.      Left Ear: External ear normal.      Nose: Nose normal.   Pulmonary:      Effort: Pulmonary effort is normal.   Neurological:      Mental Status: He is alert. Mental status is at baseline.   Psychiatric:         Mood and Affect: Mood normal.           Lines/Drains:  Lines/Drains/Airways       Active Status       Name Placement date Placement time Site Days    Suprapubic Catheter 20 Fr. 02/10/25  2125  --  6                            Lab Results: I have reviewed the following results:   Results from last 7 days   Lab Units 02/17/25  0555 02/13/25  0456 02/12/25  0508   WBC Thousand/uL 6.91   < > 9.99   HEMOGLOBIN g/dL 12.5   < > 13.1   HEMATOCRIT % 40.9   < > 43.4   PLATELETS  Thousands/uL 300   < > 269   SEGS PCT %  --   --  58   LYMPHO PCT %  --   --  29   MONO PCT %  --   --  8   EOS PCT %  --   --  4    < > = values in this interval not displayed.     Results from last 7 days   Lab Units 02/17/25  0555 02/11/25  0516 02/10/25  1846   SODIUM mmol/L 137   < > 139   POTASSIUM mmol/L 3.8   < > 4.2   CHLORIDE mmol/L 105   < > 106   CO2 mmol/L 24   < > 23   BUN mg/dL 16   < > 17   CREATININE mg/dL 0.84   < > 0.84   ANION GAP mmol/L 8   < > 10   CALCIUM mg/dL 9.1   < > 10.2   ALBUMIN g/dL  --   --  4.0   TOTAL BILIRUBIN mg/dL  --   --  0.34   ALK PHOS U/L  --   --  75   ALT U/L  --   --  6*   AST U/L  --   --  11*   GLUCOSE RANDOM mg/dL 108   < > 87    < > = values in this interval not displayed.     Results from last 7 days   Lab Units 02/10/25  1932   INR  0.94     Results from last 7 days   Lab Units 02/17/25  1115 02/17/25  0759 02/16/25  2036 02/16/25  1642 02/16/25  1114 02/16/25  0745 02/15/25  2101 02/15/25  1612 02/15/25  1138 02/15/25  0611 02/14/25  2019 02/14/25  1606   POC GLUCOSE mg/dl 153* 123 159* 133 167* 182* 187* 160* 225* 160* 298* 157*     Results from last 7 days   Lab Units 02/10/25  1846   HEMOGLOBIN A1C % 6.3*     Results from last 7 days   Lab Units 02/11/25  0404 02/11/25  0027 02/10/25  2214 02/10/25  1954   LACTIC ACID mmol/L 2.1* 2.8* 2.2*  --    PROCALCITONIN ng/ml  --   --   --  <0.05       Recent Cultures (last 7 days):   Results from last 7 days   Lab Units 02/10/25  2131 02/10/25  1955 02/10/25  1954   BLOOD CULTURE   --  No Growth After 5 Days. No Growth After 5 Days.   URINE CULTURE  >100,000 cfu/ml Enterobacter cloacae*  50,000-59,000 cfu/ml  --   --           Last 24 Hours Medication List:     Current Facility-Administered Medications:     acetaminophen (TYLENOL) tablet 650 mg, Q6H PRN    aluminum-magnesium hydroxide-simethicone (MAALOX) oral suspension 30 mL, Q4H PRN    amLODIPine (NORVASC) tablet 10 mg, Daily    atorvastatin (LIPITOR) tablet 80 mg,  Daily With Dinner    baclofen tablet 5 mg, TID    budesonide-formoterol (SYMBICORT) 160-4.5 mcg/act inhaler 2 puff, BID    clopidogrel (PLAVIX) tablet 75 mg, Daily    cyanocobalamin (VITAMIN B-12) tablet 500 mcg, Daily    enoxaparin (LOVENOX) subcutaneous injection 40 mg, Daily    gabapentin (NEURONTIN) capsule 300 mg, BID    gabapentin (NEURONTIN) capsule 600 mg, HS    insulin lispro (HumALOG/ADMELOG) 100 units/mL subcutaneous injection 1-5 Units, TID AC **AND** Fingerstick Glucose (POCT), TID AC    insulin lispro (HumALOG/ADMELOG) 100 units/mL subcutaneous injection 1-5 Units, HS    latanoprost (XALATAN) 0.005 % ophthalmic solution 1 drop, HS    melatonin tablet 3 mg, HS PRN    mirtazapine (REMERON) tablet 7.5 mg, HS    nystatin (MYCOSTATIN) powder, BID    ondansetron (ZOFRAN) injection 4 mg, Q4H PRN    oxyCODONE (ROXICODONE) IR tablet 5 mg, Q6H PRN    pantoprazole (PROTONIX) EC tablet 40 mg, Early Morning    polyethylene glycol (MIRALAX) packet 17 g, Daily    propranolol (INDERAL LA) 24 hr capsule 60 mg, Daily    senna (SENOKOT) tablet 17.2 mg, HS    Administrative Statements   Today, Patient Was Seen By: Adam Charles MD      **Please Note: This note may have been constructed using a voice recognition system.**

## 2025-02-17 NOTE — PLAN OF CARE
Problem: Potential for Falls  Goal: Patient will remain free of falls  Description: INTERVENTIONS:  - Educate patient/family on patient safety including physical limitations  - Instruct patient to call for assistance with activity   - Consult OT/PT to assist with strengthening/mobility   - Keep Call bell within reach  - Keep bed low and locked with side rails adjusted as appropriate  - Keep care items and personal belongings within reach  - Initiate and maintain comfort rounds  - Make Fall Risk Sign visible to staff  - Offer Toileting every 2 Hours, in advance of need  - Initiate/Maintain bed alarm  - Apply yellow socks and bracelet for high fall risk patients  - Consider moving patient to room near nurses station  Outcome: Progressing     Problem: PAIN - ADULT  Goal: Verbalizes/displays adequate comfort level or baseline comfort level  Description: Interventions:  - Encourage patient to monitor pain and request assistance  - Assess pain using appropriate pain scale  - Administer analgesics based on type and severity of pain and evaluate response  - Implement non-pharmacological measures as appropriate and evaluate response  - Consider cultural and social influences on pain and pain management  - Notify physician/advanced practitioner if interventions unsuccessful or patient reports new pain  Outcome: Progressing     Problem: INFECTION - ADULT  Goal: Absence or prevention of progression during hospitalization  Description: INTERVENTIONS:  - Assess and monitor for signs and symptoms of infection  - Monitor lab/diagnostic results  - Monitor all insertion sites, i.e. indwelling lines, tubes, and drains  - Monitor endotracheal if appropriate and nasal secretions for changes in amount and color  - [place] appropriate cooling/warming therapies per order  - Administer medications as ordered  - Instruct and encourage patient and family to use good hand hygiene technique  - Identify and instruct in appropriate isolation  precautions for identified infection/condition  Outcome: Progressing     Problem: DISCHARGE PLANNING  Goal: Discharge to home or other facility with appropriate resources  Description: INTERVENTIONS:  - Identify barriers to discharge w/patient and caregiver  - Arrange for needed discharge resources and transportation as appropriate  - Identify discharge learning needs (meds, wound care, etc.)  - Arrange for interpretive services to assist at discharge as needed  - Refer to Case Management Department for coordinating discharge planning if the patient needs post-hospital services based on physician/advanced practitioner order or complex needs related to functional status, cognitive ability, or social support system  Outcome: Progressing     Problem: Knowledge Deficit  Goal: Patient/family/caregiver demonstrates understanding of disease process, treatment plan, medications, and discharge instructions  Description: Complete learning assessment and assess knowledge base.  Interventions:  - Provide teaching at level of understanding  - Provide teaching via preferred learning methods  Outcome: Progressing     Problem: GASTROINTESTINAL - ADULT  Goal: Minimal or absence of nausea and/or vomiting  Description: INTERVENTIONS:  - Administer IV fluids if ordered to ensure adequate hydration  - Maintain NPO status until nausea and vomiting are resolved  - Nasogastric tube if ordered  - Administer ordered antiemetic medications as needed  - Provide nonpharmacologic comfort measures as appropriate  - Advance diet as tolerated, if ordered  - Consider nutrition services referral to assist patient with adequate nutrition and appropriate food choices  Outcome: Progressing  Goal: Maintains or returns to baseline bowel function  Description: INTERVENTIONS:  - Assess bowel function  - Encourage oral fluids to ensure adequate hydration  - Administer IV fluids if ordered to ensure adequate hydration  - Administer ordered medications as  needed  - Encourage mobilization and activity  - Consider nutritional services referral to assist patient with adequate nutrition and appropriate food choices  Outcome: Progressing  Goal: Maintains adequate nutritional intake  Description: INTERVENTIONS:  - Monitor percentage of each meal consumed  - Identify factors contributing to decreased intake, treat as appropriate  - Assist with meals as needed  - Monitor I&O, weight, and lab values if indicated  - Obtain nutrition services referral as needed  Outcome: Progressing     Problem: GENITOURINARY - ADULT  Goal: Maintains or returns to baseline urinary function  Description: INTERVENTIONS:  - Assess urinary function  - Encourage oral fluids to ensure adequate hydration if ordered  - Administer IV fluids as ordered to ensure adequate hydration  - Administer ordered medications as needed  - Offer frequent toileting  - Follow urinary retention protocol if ordered  Outcome: Progressing  Goal: Absence of urinary retention  Description: INTERVENTIONS:  - Assess patient's ability to void and empty bladder  - Monitor I/O  - Bladder scan as needed  - Discuss with physician/AP medications to alleviate retention as needed  - Discuss catheterization for long term situations as appropriate  Outcome: Progressing  Goal: Urinary catheter remains patent  Description: INTERVENTIONS:  - Assess patency of urinary catheter  - If patient has a chronic dela cruz, consider changing catheter if non-functioning  - Follow guidelines for intermittent irrigation of non-functioning urinary catheter  Outcome: Progressing     Problem: METABOLIC, FLUID AND ELECTROLYTES - ADULT  Goal: Electrolytes maintained within normal limits  Description: INTERVENTIONS:  - Monitor labs and assess patient for signs and symptoms of electrolyte imbalances  - Administer electrolyte replacement as ordered  - Monitor response to electrolyte replacements, including repeat lab results as appropriate  - Instruct patient on  fluid and nutrition as appropriate  Outcome: Progressing  Goal: Fluid balance maintained  Description: INTERVENTIONS:  - Monitor labs   - Monitor I/O and WT  - Instruct patient on fluid and nutrition as appropriate  - Assess for signs & symptoms of volume excess or deficit  Outcome: Progressing  Goal: Glucose maintained within target range  Description: INTERVENTIONS:  - Monitor Blood Glucose as ordered  - Assess for signs and symptoms of hyperglycemia and hypoglycemia  - Administer ordered medications to maintain glucose within target range  - Assess nutritional intake and initiate nutrition service referral as needed  Outcome: Progressing     Problem: Prexisting or High Potential for Compromised Skin Integrity  Goal: Skin integrity is maintained or improved  Description: INTERVENTIONS:  - Identify patients at risk for skin breakdown  - Assess and monitor skin integrity  - Assess and monitor nutrition and hydration status  - Monitor labs   - Assess for incontinence   - Turn and reposition patient  - Assist with mobility/ambulation  - Relieve pressure over bony prominences  - Avoid friction and shearing  - Provide appropriate hygiene as needed including keeping skin clean and dry  - Evaluate need for skin moisturizer/barrier cream  - Collaborate with interdisciplinary team   - Patient/family teaching  - Consider wound care consult   Outcome: Progressing     Problem: Nutrition/Hydration-ADULT  Goal: Nutrient/Hydration intake appropriate for improving, restoring or maintaining nutritional needs  Description: Monitor and assess patient's nutrition/hydration status for malnutrition. Collaborate with interdisciplinary team and initiate plan and interventions as ordered.  Monitor patient's weight and dietary intake as ordered or per policy. Utilize nutrition screening tool and intervene as necessary. Determine patient's food preferences and provide high-protein, high-caloric foods as appropriate.     INTERVENTIONS:  -  Monitor oral intake, urinary output, labs, and treatment plans  - Assess nutrition and hydration status and recommend course of action  - Evaluate amount of meals eaten  - Assist patient with eating if necessary   - Allow adequate time for meals  - Recommend/ encourage appropriate diets, oral nutritional supplements, and vitamin/mineral supplements  - Order, calculate, and assess calorie counts as needed  - Recommend, monitor, and adjust tube feedings and TPN/PPN based on assessed needs  - Assess need for intravenous fluids  - Provide specific nutrition/hydration education as appropriate  - Include patient/family/caregiver in decisions related to nutrition  Outcome: Progressing

## 2025-02-18 LAB
GLUCOSE SERPL-MCNC: 139 MG/DL (ref 65–140)
GLUCOSE SERPL-MCNC: 142 MG/DL (ref 65–140)
GLUCOSE SERPL-MCNC: 147 MG/DL (ref 65–140)
GLUCOSE SERPL-MCNC: 156 MG/DL (ref 65–140)

## 2025-02-18 PROCEDURE — 82948 REAGENT STRIP/BLOOD GLUCOSE: CPT

## 2025-02-18 PROCEDURE — 99232 SBSQ HOSP IP/OBS MODERATE 35: CPT | Performed by: INTERNAL MEDICINE

## 2025-02-18 PROCEDURE — 97110 THERAPEUTIC EXERCISES: CPT

## 2025-02-18 PROCEDURE — 97530 THERAPEUTIC ACTIVITIES: CPT

## 2025-02-18 RX ADMIN — NYSTATIN: 100000 POWDER TOPICAL at 17:01

## 2025-02-18 RX ADMIN — PROPRANOLOL HYDROCHLORIDE 60 MG: 60 CAPSULE, EXTENDED RELEASE ORAL at 09:33

## 2025-02-18 RX ADMIN — PANTOPRAZOLE SODIUM 40 MG: 40 TABLET, DELAYED RELEASE ORAL at 05:03

## 2025-02-18 RX ADMIN — BACLOFEN 5 MG: 10 TABLET ORAL at 09:31

## 2025-02-18 RX ADMIN — ENOXAPARIN SODIUM 40 MG: 40 INJECTION SUBCUTANEOUS at 09:31

## 2025-02-18 RX ADMIN — CLOPIDOGREL BISULFATE 75 MG: 75 TABLET ORAL at 09:32

## 2025-02-18 RX ADMIN — SENNOSIDES 17.2 MG: 8.6 TABLET ORAL at 22:11

## 2025-02-18 RX ADMIN — GABAPENTIN 300 MG: 300 CAPSULE ORAL at 09:31

## 2025-02-18 RX ADMIN — INSULIN LISPRO 1 UNITS: 100 INJECTION, SOLUTION INTRAVENOUS; SUBCUTANEOUS at 14:01

## 2025-02-18 RX ADMIN — BUDESONIDE AND FORMOTEROL FUMARATE DIHYDRATE 2 PUFF: 160; 4.5 AEROSOL RESPIRATORY (INHALATION) at 17:01

## 2025-02-18 RX ADMIN — ATORVASTATIN CALCIUM 80 MG: 80 TABLET, FILM COATED ORAL at 17:02

## 2025-02-18 RX ADMIN — GABAPENTIN 300 MG: 300 CAPSULE ORAL at 17:02

## 2025-02-18 RX ADMIN — BUDESONIDE AND FORMOTEROL FUMARATE DIHYDRATE 2 PUFF: 160; 4.5 AEROSOL RESPIRATORY (INHALATION) at 09:33

## 2025-02-18 RX ADMIN — NYSTATIN: 100000 POWDER TOPICAL at 09:33

## 2025-02-18 RX ADMIN — BACLOFEN 5 MG: 10 TABLET ORAL at 22:10

## 2025-02-18 RX ADMIN — MIRTAZAPINE 7.5 MG: 7.5 TABLET, FILM COATED ORAL at 22:11

## 2025-02-18 RX ADMIN — Medication 500 MCG: at 09:32

## 2025-02-18 RX ADMIN — POLYETHYLENE GLYCOL 3350 17 G: 17 POWDER, FOR SOLUTION ORAL at 09:33

## 2025-02-18 RX ADMIN — BACLOFEN 5 MG: 10 TABLET ORAL at 17:02

## 2025-02-18 RX ADMIN — LATANOPROST 1 DROP: 50 SOLUTION OPHTHALMIC at 22:12

## 2025-02-18 RX ADMIN — AMLODIPINE BESYLATE 10 MG: 10 TABLET ORAL at 09:32

## 2025-02-18 RX ADMIN — GABAPENTIN 600 MG: 300 CAPSULE ORAL at 22:12

## 2025-02-18 NOTE — RESTORATIVE TECHNICIAN NOTE
Restorative Technician Note      Patient Name: Mathew Rico     Restorative Tech Visit Date: 02/18/25  Note Type: Mobility  Patient Position Upon Consult: Supine  Activity Performed: Dangled  Assistive Device: Other (Comment) (sit on side the bad)  Patient Position at End of Consult: All needs within reach; Supine; Bed/Chair alarm activated        ns

## 2025-02-18 NOTE — PLAN OF CARE
Problem: PHYSICAL THERAPY ADULT  Goal: Performs mobility at highest level of function for planned discharge setting.  See evaluation for individualized goals.  Description: Treatment/Interventions: LE strengthening/ROM, Therapeutic exercise, Endurance training, Patient/family training, Equipment eval/education, Bed mobility, Compensatory technique education, Spoke to nursing, OT          See flowsheet documentation for full assessment, interventions and recommendations.  Outcome: Progressing  Note: Prognosis: Guarded  Problem List: Decreased range of motion, Decreased strength, Decreased endurance, Impaired balance, Decreased mobility, Decreased coordination, Impaired judgement, Impaired tone  Assessment: Pt. showed improved mobility this session though he continues to require extenisve A for levels of mobility. Pt. able to perform LE ROM with assistance. Noted M. spasms and involutarty movements of LEs during session. Pt. able to maintain static sitting with Max A to avoid posterior LOB and progressed to Mod A with max cues and S when seated with UEs supported on table and also 1 UE supported on table and the other on bed. Noted lateral lean when seated EOB and able to correct it with VCs. Pt. positioned back in bed with all needs within reach and bed alarm engaged. Pt. given total A for pericare post BM when in supine. Will continue to follow per PT POC.  Barriers to Discharge: None  Barriers to Discharge Comments: \  Rehab Resource Intensity Level, PT: II (Moderate Resource Intensity)    See flowsheet documentation for full assessment.

## 2025-02-18 NOTE — PLAN OF CARE
Problem: Potential for Falls  Goal: Patient will remain free of falls  Description: INTERVENTIONS:  - Educate patient/family on patient safety including physical limitations  - Instruct patient to call for assistance with activity   - Consult OT/PT to assist with strengthening/mobility   - Keep Call bell within reach  - Keep bed low and locked with side rails adjusted as appropriate  - Keep care items and personal belongings within reach  - Initiate and maintain comfort rounds  - Make Fall Risk Sign visible to staff  - Offer Toileting every 2 Hours, in advance of need  - Initiate/Maintain bed alarm  - Apply yellow socks and bracelet for high fall risk patients  - Consider moving patient to room near nurses station  Outcome: Progressing     Problem: PAIN - ADULT  Goal: Verbalizes/displays adequate comfort level or baseline comfort level  Description: Interventions:  - Encourage patient to monitor pain and request assistance  - Assess pain using appropriate pain scale  - Administer analgesics based on type and severity of pain and evaluate response  - Implement non-pharmacological measures as appropriate and evaluate response  - Consider cultural and social influences on pain and pain management  - Notify physician/advanced practitioner if interventions unsuccessful or patient reports new pain  Outcome: Progressing     Problem: INFECTION - ADULT  Goal: Absence or prevention of progression during hospitalization  Description: INTERVENTIONS:  - Assess and monitor for signs and symptoms of infection  - Monitor lab/diagnostic results  - Monitor all insertion sites, i.e. indwelling lines, tubes, and drains  - Monitor endotracheal if appropriate and nasal secretions for changes in amount and color  - [place] appropriate cooling/warming therapies per order  - Administer medications as ordered  - Instruct and encourage patient and family to use good hand hygiene technique  - Identify and instruct in appropriate isolation  precautions for identified infection/condition  Outcome: Progressing     Problem: DISCHARGE PLANNING  Goal: Discharge to home or other facility with appropriate resources  Description: INTERVENTIONS:  - Identify barriers to discharge w/patient and caregiver  - Arrange for needed discharge resources and transportation as appropriate  - Identify discharge learning needs (meds, wound care, etc.)  - Arrange for interpretive services to assist at discharge as needed  - Refer to Case Management Department for coordinating discharge planning if the patient needs post-hospital services based on physician/advanced practitioner order or complex needs related to functional status, cognitive ability, or social support system  Outcome: Progressing     Problem: Knowledge Deficit  Goal: Patient/family/caregiver demonstrates understanding of disease process, treatment plan, medications, and discharge instructions  Description: Complete learning assessment and assess knowledge base.  Interventions:  - Provide teaching at level of understanding  - Provide teaching via preferred learning methods  Outcome: Progressing     Problem: GASTROINTESTINAL - ADULT  Goal: Minimal or absence of nausea and/or vomiting  Description: INTERVENTIONS:  - Administer IV fluids if ordered to ensure adequate hydration  - Maintain NPO status until nausea and vomiting are resolved  - Nasogastric tube if ordered  - Administer ordered antiemetic medications as needed  - Provide nonpharmacologic comfort measures as appropriate  - Advance diet as tolerated, if ordered  - Consider nutrition services referral to assist patient with adequate nutrition and appropriate food choices  Outcome: Progressing  Goal: Maintains or returns to baseline bowel function  Description: INTERVENTIONS:  - Assess bowel function  - Encourage oral fluids to ensure adequate hydration  - Administer IV fluids if ordered to ensure adequate hydration  - Administer ordered medications as  needed  - Encourage mobilization and activity  - Consider nutritional services referral to assist patient with adequate nutrition and appropriate food choices  Outcome: Progressing  Goal: Maintains adequate nutritional intake  Description: INTERVENTIONS:  - Monitor percentage of each meal consumed  - Identify factors contributing to decreased intake, treat as appropriate  - Assist with meals as needed  - Monitor I&O, weight, and lab values if indicated  - Obtain nutrition services referral as needed  Outcome: Progressing     Problem: GENITOURINARY - ADULT  Goal: Maintains or returns to baseline urinary function  Description: INTERVENTIONS:  - Assess urinary function  - Encourage oral fluids to ensure adequate hydration if ordered  - Administer IV fluids as ordered to ensure adequate hydration  - Administer ordered medications as needed  - Offer frequent toileting  - Follow urinary retention protocol if ordered  Outcome: Progressing  Goal: Absence of urinary retention  Description: INTERVENTIONS:  - Assess patient's ability to void and empty bladder  - Monitor I/O  - Bladder scan as needed  - Discuss with physician/AP medications to alleviate retention as needed  - Discuss catheterization for long term situations as appropriate  Outcome: Progressing  Goal: Urinary catheter remains patent  Description: INTERVENTIONS:  - Assess patency of urinary catheter  - If patient has a chronic dela cruz, consider changing catheter if non-functioning  - Follow guidelines for intermittent irrigation of non-functioning urinary catheter  Outcome: Progressing     Problem: METABOLIC, FLUID AND ELECTROLYTES - ADULT  Goal: Electrolytes maintained within normal limits  Description: INTERVENTIONS:  - Monitor labs and assess patient for signs and symptoms of electrolyte imbalances  - Administer electrolyte replacement as ordered  - Monitor response to electrolyte replacements, including repeat lab results as appropriate  - Instruct patient on  fluid and nutrition as appropriate  Outcome: Progressing  Goal: Fluid balance maintained  Description: INTERVENTIONS:  - Monitor labs   - Monitor I/O and WT  - Instruct patient on fluid and nutrition as appropriate  - Assess for signs & symptoms of volume excess or deficit  Outcome: Progressing  Goal: Glucose maintained within target range  Description: INTERVENTIONS:  - Monitor Blood Glucose as ordered  - Assess for signs and symptoms of hyperglycemia and hypoglycemia  - Administer ordered medications to maintain glucose within target range  - Assess nutritional intake and initiate nutrition service referral as needed  Outcome: Progressing     Problem: Prexisting or High Potential for Compromised Skin Integrity  Goal: Skin integrity is maintained or improved  Description: INTERVENTIONS:  - Identify patients at risk for skin breakdown  - Assess and monitor skin integrity  - Assess and monitor nutrition and hydration status  - Monitor labs   - Assess for incontinence   - Turn and reposition patient  - Assist with mobility/ambulation  - Relieve pressure over bony prominences  - Avoid friction and shearing  - Provide appropriate hygiene as needed including keeping skin clean and dry  - Evaluate need for skin moisturizer/barrier cream  - Collaborate with interdisciplinary team   - Patient/family teaching  - Consider wound care consult   Outcome: Progressing     Problem: Nutrition/Hydration-ADULT  Goal: Nutrient/Hydration intake appropriate for improving, restoring or maintaining nutritional needs  Description: Monitor and assess patient's nutrition/hydration status for malnutrition. Collaborate with interdisciplinary team and initiate plan and interventions as ordered.  Monitor patient's weight and dietary intake as ordered or per policy. Utilize nutrition screening tool and intervene as necessary. Determine patient's food preferences and provide high-protein, high-caloric foods as appropriate.     INTERVENTIONS:  -  Monitor oral intake, urinary output, labs, and treatment plans  - Assess nutrition and hydration status and recommend course of action  - Evaluate amount of meals eaten  - Assist patient with eating if necessary   - Allow adequate time for meals  - Recommend/ encourage appropriate diets, oral nutritional supplements, and vitamin/mineral supplements  - Order, calculate, and assess calorie counts as needed  - Recommend, monitor, and adjust tube feedings and TPN/PPN based on assessed needs  - Assess need for intravenous fluids  - Provide specific nutrition/hydration education as appropriate  - Include patient/family/caregiver in decisions related to nutrition  Outcome: Progressing

## 2025-02-18 NOTE — PLAN OF CARE
Problem: Potential for Falls  Goal: Patient will remain free of falls  Description: INTERVENTIONS:  - Educate patient/family on patient safety including physical limitations  - Instruct patient to call for assistance with activity   - Consult OT/PT to assist with strengthening/mobility   - Keep Call bell within reach  - Keep bed low and locked with side rails adjusted as appropriate  - Keep care items and personal belongings within reach  - Initiate and maintain comfort rounds  - Make Fall Risk Sign visible to staff  - Offer Toileting every 2 Hours, in advance of need  - Initiate/Maintain bed alarm  - Apply yellow socks and bracelet for high fall risk patients  - Consider moving patient to room near nurses station  Outcome: Progressing     Problem: PAIN - ADULT  Goal: Verbalizes/displays adequate comfort level or baseline comfort level  Description: Interventions:  - Encourage patient to monitor pain and request assistance  - Assess pain using appropriate pain scale  - Administer analgesics based on type and severity of pain and evaluate response  - Implement non-pharmacological measures as appropriate and evaluate response  - Consider cultural and social influences on pain and pain management  - Notify physician/advanced practitioner if interventions unsuccessful or patient reports new pain  Outcome: Progressing     Problem: INFECTION - ADULT  Goal: Absence or prevention of progression during hospitalization  Description: INTERVENTIONS:  - Assess and monitor for signs and symptoms of infection  - Monitor lab/diagnostic results  - Monitor all insertion sites, i.e. indwelling lines, tubes, and drains  - Monitor endotracheal if appropriate and nasal secretions for changes in amount and color  - [place] appropriate cooling/warming therapies per order  - Administer medications as ordered  - Instruct and encourage patient and family to use good hand hygiene technique  - Identify and instruct in appropriate isolation  precautions for identified infection/condition  Outcome: Progressing     Problem: DISCHARGE PLANNING  Goal: Discharge to home or other facility with appropriate resources  Description: INTERVENTIONS:  - Identify barriers to discharge w/patient and caregiver  - Arrange for needed discharge resources and transportation as appropriate  - Identify discharge learning needs (meds, wound care, etc.)  - Arrange for interpretive services to assist at discharge as needed  - Refer to Case Management Department for coordinating discharge planning if the patient needs post-hospital services based on physician/advanced practitioner order or complex needs related to functional status, cognitive ability, or social support system  Outcome: Progressing     Problem: Knowledge Deficit  Goal: Patient/family/caregiver demonstrates understanding of disease process, treatment plan, medications, and discharge instructions  Description: Complete learning assessment and assess knowledge base.  Interventions:  - Provide teaching at level of understanding  - Provide teaching via preferred learning methods  Outcome: Progressing     Problem: GASTROINTESTINAL - ADULT  Goal: Minimal or absence of nausea and/or vomiting  Description: INTERVENTIONS:  - Administer IV fluids if ordered to ensure adequate hydration  - Maintain NPO status until nausea and vomiting are resolved  - Nasogastric tube if ordered  - Administer ordered antiemetic medications as needed  - Provide nonpharmacologic comfort measures as appropriate  - Advance diet as tolerated, if ordered  - Consider nutrition services referral to assist patient with adequate nutrition and appropriate food choices  Outcome: Progressing  Goal: Maintains or returns to baseline bowel function  Description: INTERVENTIONS:  - Assess bowel function  - Encourage oral fluids to ensure adequate hydration  - Administer IV fluids if ordered to ensure adequate hydration  - Administer ordered medications as  needed  - Encourage mobilization and activity  - Consider nutritional services referral to assist patient with adequate nutrition and appropriate food choices  Outcome: Progressing  Goal: Maintains adequate nutritional intake  Description: INTERVENTIONS:  - Monitor percentage of each meal consumed  - Identify factors contributing to decreased intake, treat as appropriate  - Assist with meals as needed  - Monitor I&O, weight, and lab values if indicated  - Obtain nutrition services referral as needed  Outcome: Progressing     Problem: GENITOURINARY - ADULT  Goal: Maintains or returns to baseline urinary function  Description: INTERVENTIONS:  - Assess urinary function  - Encourage oral fluids to ensure adequate hydration if ordered  - Administer IV fluids as ordered to ensure adequate hydration  - Administer ordered medications as needed  - Offer frequent toileting  - Follow urinary retention protocol if ordered  Outcome: Progressing  Goal: Absence of urinary retention  Description: INTERVENTIONS:  - Assess patient's ability to void and empty bladder  - Monitor I/O  - Bladder scan as needed  - Discuss with physician/AP medications to alleviate retention as needed  - Discuss catheterization for long term situations as appropriate  Outcome: Progressing  Goal: Urinary catheter remains patent  Description: INTERVENTIONS:  - Assess patency of urinary catheter  - If patient has a chronic dela cruz, consider changing catheter if non-functioning  - Follow guidelines for intermittent irrigation of non-functioning urinary catheter  Outcome: Progressing     Problem: METABOLIC, FLUID AND ELECTROLYTES - ADULT  Goal: Electrolytes maintained within normal limits  Description: INTERVENTIONS:  - Monitor labs and assess patient for signs and symptoms of electrolyte imbalances  - Administer electrolyte replacement as ordered  - Monitor response to electrolyte replacements, including repeat lab results as appropriate  - Instruct patient on  fluid and nutrition as appropriate  Outcome: Progressing  Goal: Fluid balance maintained  Description: INTERVENTIONS:  - Monitor labs   - Monitor I/O and WT  - Instruct patient on fluid and nutrition as appropriate  - Assess for signs & symptoms of volume excess or deficit  Outcome: Progressing  Goal: Glucose maintained within target range  Description: INTERVENTIONS:  - Monitor Blood Glucose as ordered  - Assess for signs and symptoms of hyperglycemia and hypoglycemia  - Administer ordered medications to maintain glucose within target range  - Assess nutritional intake and initiate nutrition service referral as needed  Outcome: Progressing     Problem: Prexisting or High Potential for Compromised Skin Integrity  Goal: Skin integrity is maintained or improved  Description: INTERVENTIONS:  - Identify patients at risk for skin breakdown  - Assess and monitor skin integrity  - Assess and monitor nutrition and hydration status  - Monitor labs   - Assess for incontinence   - Turn and reposition patient  - Assist with mobility/ambulation  - Relieve pressure over bony prominences  - Avoid friction and shearing  - Provide appropriate hygiene as needed including keeping skin clean and dry  - Evaluate need for skin moisturizer/barrier cream  - Collaborate with interdisciplinary team   - Patient/family teaching  - Consider wound care consult   Outcome: Progressing     Problem: Nutrition/Hydration-ADULT  Goal: Nutrient/Hydration intake appropriate for improving, restoring or maintaining nutritional needs  Description: Monitor and assess patient's nutrition/hydration status for malnutrition. Collaborate with interdisciplinary team and initiate plan and interventions as ordered.  Monitor patient's weight and dietary intake as ordered or per policy. Utilize nutrition screening tool and intervene as necessary. Determine patient's food preferences and provide high-protein, high-caloric foods as appropriate.     INTERVENTIONS:  -  Monitor oral intake, urinary output, labs, and treatment plans  - Assess nutrition and hydration status and recommend course of action  - Evaluate amount of meals eaten  - Assist patient with eating if necessary   - Allow adequate time for meals  - Recommend/ encourage appropriate diets, oral nutritional supplements, and vitamin/mineral supplements  - Order, calculate, and assess calorie counts as needed  - Recommend, monitor, and adjust tube feedings and TPN/PPN based on assessed needs  - Assess need for intravenous fluids  - Provide specific nutrition/hydration education as appropriate  - Include patient/family/caregiver in decisions related to nutrition  Outcome: Progressing

## 2025-02-18 NOTE — PHYSICAL THERAPY NOTE
Physical Therapy Treatment Note     02/18/25 1525   PT Last Visit   PT Visit Date 02/18/25   Note Type   Note Type Treatment   Pain Assessment   Pain Assessment Tool 0-10   Pain Score No Pain   Restrictions/Precautions   Weight Bearing Precautions Per Order No   Other Precautions Contact/isolation;Fall Risk;Telemetry;Bed Alarm   General   Chart Reviewed Yes   Family/Caregiver Present No   Subjective   Subjective Pt. agreeable to therapy   Bed Mobility   Rolling R 2  Maximal assistance   Additional items Assist x 1;Increased time required;Bedrails;LE management;Verbal cues   Rolling L 2  Maximal assistance   Additional items Assist x 1;Bedrails;Increased time required;Verbal cues;LE management   Supine to Sit 3  Moderate assistance   Additional items Assist x 2;Increased time required;LE management;Verbal cues;Bedrails;HOB elevated   Sit to Supine 3  Moderate assistance   Additional items Assist x 2;LE management;Verbal cues   Transfers   Additional Comments Max A for sitting at EOB progressing to Mod and S with table support   Balance   Static Sitting Poor -   Endurance Deficit   Endurance Deficit Yes   Endurance Deficit Description general deconditioning   Activity Tolerance   Activity Tolerance Patient tolerated treatment well   Nurse Made Aware yes   Exercises   TKR Supine;Bilateral;AAROM;10 reps;20 reps   Assessment   Prognosis Guarded   Problem List Decreased range of motion;Decreased strength;Decreased endurance;Impaired balance;Decreased mobility;Decreased coordination;Impaired judgement;Impaired tone   Assessment Pt. showed improved mobility this session though he continues to require extenisve A for levels of mobility. Pt. able to perform LE ROM with assistance. Noted M. spasms and involutarty movements of LEs during session. Pt. able to maintain static sitting with Max A to avoid posterior LOB and progressed to Mod A with max cues and S when seated with UEs supported on table and also 1 UE supported on  table and the other on bed. Noted lateral lean when seated EOB and able to correct it with VCs. Pt. positioned back in bed with all needs within reach and bed alarm engaged. Pt. given total A for pericare post BM when in supine. Will continue to follow per PT POC.   Barriers to Discharge None   Barriers to Discharge Comments \   Goals   Patient Goals I need to go to rehab   STG Expiration Date 02/28/25   PT Treatment Day 1   Plan   Treatment/Interventions Functional transfer training;LE strengthening/ROM;Therapeutic exercise;Patient/family training;Bed mobility;Spoke to nursing;Spoke to MD   Progress No functional improvements   PT Frequency 1-2x/wk   Discharge Recommendation   Rehab Resource Intensity Level, PT II (Moderate Resource Intensity)   AM-PAC Basic Mobility Inpatient   Turning in Flat Bed Without Bedrails 2   Lying on Back to Sitting on Edge of Flat Bed Without Bedrails 2   Moving Bed to Chair 2   Standing Up From Chair Using Arms 2   Walk in Room 1   Climb 3-5 Stairs With Railing 1   Basic Mobility Inpatient Raw Score 10   Turning Head Towards Sound 4   Follow Simple Instructions 3   Low Function Basic Mobility Raw Score  17   Low Function Basic Mobility Standardized Score  27.46   [facility] Highest Level Of Mobility   -M Goal 4: Move to chair/commode   -M Achieved 3: Sit at edge of bed   End of Consult   Patient Position at End of Consult All needs within reach;Bed/Chair alarm activated;Supine           Olivia Foster PTA    An AM-PAC basic mobility standardized score less than 42.9 suggest the patient may benefit from discharge to post-acute rehab services.

## 2025-02-18 NOTE — PLAN OF CARE
Problem: Potential for Falls  Goal: Patient will remain free of falls  Description: INTERVENTIONS:  - Educate patient/family on patient safety including physical limitations  - Instruct patient to call for assistance with activity   - Consult OT/PT to assist with strengthening/mobility   - Keep Call bell within reach  - Keep bed low and locked with side rails adjusted as appropriate  - Keep care items and personal belongings within reach  - Initiate and maintain comfort rounds  - Make Fall Risk Sign visible to staff  - Offer Toileting every 2 Hours, in advance of need  - Initiate/Maintain bed alarm  - Apply yellow socks and bracelet for high fall risk patients  - Consider moving patient to room near nurses station  Outcome: Progressing     Problem: PAIN - ADULT  Goal: Verbalizes/displays adequate comfort level or baseline comfort level  Description: Interventions:  - Encourage patient to monitor pain and request assistance  - Assess pain using appropriate pain scale  - Administer analgesics based on type and severity of pain and evaluate response  - Implement non-pharmacological measures as appropriate and evaluate response  - Consider cultural and social influences on pain and pain management  - Notify physician/advanced practitioner if interventions unsuccessful or patient reports new pain  Outcome: Progressing     Problem: INFECTION - ADULT  Goal: Absence or prevention of progression during hospitalization  Description: INTERVENTIONS:  - Assess and monitor for signs and symptoms of infection  - Monitor lab/diagnostic results  - Monitor all insertion sites, i.e. indwelling lines, tubes, and drains  - Monitor endotracheal if appropriate and nasal secretions for changes in amount and color  - [place] appropriate cooling/warming therapies per order  - Administer medications as ordered  - Instruct and encourage patient and family to use good hand hygiene technique  - Identify and instruct in appropriate isolation  precautions for identified infection/condition  Outcome: Progressing     Problem: DISCHARGE PLANNING  Goal: Discharge to home or other facility with appropriate resources  Description: INTERVENTIONS:  - Identify barriers to discharge w/patient and caregiver  - Arrange for needed discharge resources and transportation as appropriate  - Identify discharge learning needs (meds, wound care, etc.)  - Arrange for interpretive services to assist at discharge as needed  - Refer to Case Management Department for coordinating discharge planning if the patient needs post-hospital services based on physician/advanced practitioner order or complex needs related to functional status, cognitive ability, or social support system  Outcome: Progressing     Problem: Knowledge Deficit  Goal: Patient/family/caregiver demonstrates understanding of disease process, treatment plan, medications, and discharge instructions  Description: Complete learning assessment and assess knowledge base.  Interventions:  - Provide teaching at level of understanding  - Provide teaching via preferred learning methods  Outcome: Progressing     Problem: GASTROINTESTINAL - ADULT  Goal: Minimal or absence of nausea and/or vomiting  Description: INTERVENTIONS:  - Administer IV fluids if ordered to ensure adequate hydration  - Maintain NPO status until nausea and vomiting are resolved  - Nasogastric tube if ordered  - Administer ordered antiemetic medications as needed  - Provide nonpharmacologic comfort measures as appropriate  - Advance diet as tolerated, if ordered  - Consider nutrition services referral to assist patient with adequate nutrition and appropriate food choices  Outcome: Progressing  Goal: Maintains or returns to baseline bowel function  Description: INTERVENTIONS:  - Assess bowel function  - Encourage oral fluids to ensure adequate hydration  - Administer IV fluids if ordered to ensure adequate hydration  - Administer ordered medications as  needed  - Encourage mobilization and activity  - Consider nutritional services referral to assist patient with adequate nutrition and appropriate food choices  Outcome: Progressing  Goal: Maintains adequate nutritional intake  Description: INTERVENTIONS:  - Monitor percentage of each meal consumed  - Identify factors contributing to decreased intake, treat as appropriate  - Assist with meals as needed  - Monitor I&O, weight, and lab values if indicated  - Obtain nutrition services referral as needed  Outcome: Progressing     Problem: GENITOURINARY - ADULT  Goal: Maintains or returns to baseline urinary function  Description: INTERVENTIONS:  - Assess urinary function  - Encourage oral fluids to ensure adequate hydration if ordered  - Administer IV fluids as ordered to ensure adequate hydration  - Administer ordered medications as needed  - Offer frequent toileting  - Follow urinary retention protocol if ordered  Outcome: Progressing  Goal: Absence of urinary retention  Description: INTERVENTIONS:  - Assess patient's ability to void and empty bladder  - Monitor I/O  - Bladder scan as needed  - Discuss with physician/AP medications to alleviate retention as needed  - Discuss catheterization for long term situations as appropriate  Outcome: Progressing  Goal: Urinary catheter remains patent  Description: INTERVENTIONS:  - Assess patency of urinary catheter  - If patient has a chronic dela cruz, consider changing catheter if non-functioning  - Follow guidelines for intermittent irrigation of non-functioning urinary catheter  Outcome: Progressing     Problem: METABOLIC, FLUID AND ELECTROLYTES - ADULT  Goal: Electrolytes maintained within normal limits  Description: INTERVENTIONS:  - Monitor labs and assess patient for signs and symptoms of electrolyte imbalances  - Administer electrolyte replacement as ordered  - Monitor response to electrolyte replacements, including repeat lab results as appropriate  - Instruct patient on  fluid and nutrition as appropriate  Outcome: Progressing  Goal: Fluid balance maintained  Description: INTERVENTIONS:  - Monitor labs   - Monitor I/O and WT  - Instruct patient on fluid and nutrition as appropriate  - Assess for signs & symptoms of volume excess or deficit  Outcome: Progressing  Goal: Glucose maintained within target range  Description: INTERVENTIONS:  - Monitor Blood Glucose as ordered  - Assess for signs and symptoms of hyperglycemia and hypoglycemia  - Administer ordered medications to maintain glucose within target range  - Assess nutritional intake and initiate nutrition service referral as needed  Outcome: Progressing     Problem: Prexisting or High Potential for Compromised Skin Integrity  Goal: Skin integrity is maintained or improved  Description: INTERVENTIONS:  - Identify patients at risk for skin breakdown  - Assess and monitor skin integrity  - Assess and monitor nutrition and hydration status  - Monitor labs   - Assess for incontinence   - Turn and reposition patient  - Assist with mobility/ambulation  - Relieve pressure over bony prominences  - Avoid friction and shearing  - Provide appropriate hygiene as needed including keeping skin clean and dry  - Evaluate need for skin moisturizer/barrier cream  - Collaborate with interdisciplinary team   - Patient/family teaching  - Consider wound care consult   Outcome: Progressing     Problem: Nutrition/Hydration-ADULT  Goal: Nutrient/Hydration intake appropriate for improving, restoring or maintaining nutritional needs  Description: Monitor and assess patient's nutrition/hydration status for malnutrition. Collaborate with interdisciplinary team and initiate plan and interventions as ordered.  Monitor patient's weight and dietary intake as ordered or per policy. Utilize nutrition screening tool and intervene as necessary. Determine patient's food preferences and provide high-protein, high-caloric foods as appropriate.     INTERVENTIONS:  -  Monitor oral intake, urinary output, labs, and treatment plans  - Assess nutrition and hydration status and recommend course of action  - Evaluate amount of meals eaten  - Assist patient with eating if necessary   - Allow adequate time for meals  - Recommend/ encourage appropriate diets, oral nutritional supplements, and vitamin/mineral supplements  - Order, calculate, and assess calorie counts as needed  - Recommend, monitor, and adjust tube feedings and TPN/PPN based on assessed needs  - Assess need for intravenous fluids  - Provide specific nutrition/hydration education as appropriate  - Include patient/family/caregiver in decisions related to nutrition  Outcome: Progressing

## 2025-02-19 LAB
ALBUMIN SERPL BCG-MCNC: 3.3 G/DL (ref 3.5–5)
ALP SERPL-CCNC: 62 U/L (ref 34–104)
ALT SERPL W P-5'-P-CCNC: 12 U/L (ref 7–52)
ANION GAP SERPL CALCULATED.3IONS-SCNC: 8 MMOL/L (ref 4–13)
AST SERPL W P-5'-P-CCNC: 19 U/L (ref 13–39)
BILIRUB SERPL-MCNC: 0.27 MG/DL (ref 0.2–1)
BUN SERPL-MCNC: 11 MG/DL (ref 5–25)
CALCIUM ALBUM COR SERPL-MCNC: 9.3 MG/DL (ref 8.3–10.1)
CALCIUM SERPL-MCNC: 8.7 MG/DL (ref 8.4–10.2)
CHLORIDE SERPL-SCNC: 107 MMOL/L (ref 96–108)
CO2 SERPL-SCNC: 23 MMOL/L (ref 21–32)
CREAT SERPL-MCNC: 0.72 MG/DL (ref 0.6–1.3)
ERYTHROCYTE [DISTWIDTH] IN BLOOD BY AUTOMATED COUNT: 15.9 % (ref 11.6–15.1)
GFR SERPL CREATININE-BSD FRML MDRD: 91 ML/MIN/1.73SQ M
GLUCOSE SERPL-MCNC: 131 MG/DL (ref 65–140)
GLUCOSE SERPL-MCNC: 142 MG/DL (ref 65–140)
GLUCOSE SERPL-MCNC: 149 MG/DL (ref 65–140)
GLUCOSE SERPL-MCNC: 161 MG/DL (ref 65–140)
GLUCOSE SERPL-MCNC: 162 MG/DL (ref 65–140)
HCT VFR BLD AUTO: 37 % (ref 36.5–49.3)
HGB BLD-MCNC: 11.8 G/DL (ref 12–17)
MAGNESIUM SERPL-MCNC: 1.7 MG/DL (ref 1.9–2.7)
MCH RBC QN AUTO: 27.4 PG (ref 26.8–34.3)
MCHC RBC AUTO-ENTMCNC: 31.9 G/DL (ref 31.4–37.4)
MCV RBC AUTO: 86 FL (ref 82–98)
PHOSPHATE SERPL-MCNC: 3 MG/DL (ref 2.3–4.1)
PLATELET # BLD AUTO: 285 THOUSANDS/UL (ref 149–390)
PMV BLD AUTO: 8.3 FL (ref 8.9–12.7)
POTASSIUM SERPL-SCNC: 3.8 MMOL/L (ref 3.5–5.3)
PROT SERPL-MCNC: 6.4 G/DL (ref 6.4–8.4)
RBC # BLD AUTO: 4.31 MILLION/UL (ref 3.88–5.62)
SODIUM SERPL-SCNC: 138 MMOL/L (ref 135–147)
WBC # BLD AUTO: 8.9 THOUSAND/UL (ref 4.31–10.16)

## 2025-02-19 PROCEDURE — 83735 ASSAY OF MAGNESIUM: CPT | Performed by: INTERNAL MEDICINE

## 2025-02-19 PROCEDURE — 99232 SBSQ HOSP IP/OBS MODERATE 35: CPT | Performed by: INTERNAL MEDICINE

## 2025-02-19 PROCEDURE — 97535 SELF CARE MNGMENT TRAINING: CPT

## 2025-02-19 PROCEDURE — 97110 THERAPEUTIC EXERCISES: CPT

## 2025-02-19 PROCEDURE — 82948 REAGENT STRIP/BLOOD GLUCOSE: CPT

## 2025-02-19 PROCEDURE — 80053 COMPREHEN METABOLIC PANEL: CPT | Performed by: INTERNAL MEDICINE

## 2025-02-19 PROCEDURE — 85027 COMPLETE CBC AUTOMATED: CPT | Performed by: INTERNAL MEDICINE

## 2025-02-19 PROCEDURE — 84100 ASSAY OF PHOSPHORUS: CPT | Performed by: INTERNAL MEDICINE

## 2025-02-19 RX ORDER — MAGNESIUM SULFATE HEPTAHYDRATE 40 MG/ML
2 INJECTION, SOLUTION INTRAVENOUS ONCE
Status: COMPLETED | OUTPATIENT
Start: 2025-02-19 | End: 2025-02-19

## 2025-02-19 RX ADMIN — BACLOFEN 5 MG: 10 TABLET ORAL at 17:21

## 2025-02-19 RX ADMIN — PROPRANOLOL HYDROCHLORIDE 60 MG: 60 CAPSULE, EXTENDED RELEASE ORAL at 09:00

## 2025-02-19 RX ADMIN — NYSTATIN: 100000 POWDER TOPICAL at 17:22

## 2025-02-19 RX ADMIN — INSULIN LISPRO 1 UNITS: 100 INJECTION, SOLUTION INTRAVENOUS; SUBCUTANEOUS at 21:31

## 2025-02-19 RX ADMIN — CLOPIDOGREL BISULFATE 75 MG: 75 TABLET ORAL at 09:00

## 2025-02-19 RX ADMIN — GABAPENTIN 300 MG: 300 CAPSULE ORAL at 17:21

## 2025-02-19 RX ADMIN — GABAPENTIN 300 MG: 300 CAPSULE ORAL at 09:00

## 2025-02-19 RX ADMIN — SENNOSIDES 17.2 MG: 8.6 TABLET ORAL at 21:32

## 2025-02-19 RX ADMIN — NYSTATIN: 100000 POWDER TOPICAL at 09:04

## 2025-02-19 RX ADMIN — BACLOFEN 5 MG: 10 TABLET ORAL at 21:32

## 2025-02-19 RX ADMIN — PANTOPRAZOLE SODIUM 40 MG: 40 TABLET, DELAYED RELEASE ORAL at 05:37

## 2025-02-19 RX ADMIN — INSULIN LISPRO 1 UNITS: 100 INJECTION, SOLUTION INTRAVENOUS; SUBCUTANEOUS at 11:54

## 2025-02-19 RX ADMIN — LATANOPROST 1 DROP: 50 SOLUTION OPHTHALMIC at 21:33

## 2025-02-19 RX ADMIN — MAGNESIUM SULFATE HEPTAHYDRATE 2 G: 40 INJECTION, SOLUTION INTRAVENOUS at 10:08

## 2025-02-19 RX ADMIN — ACETAMINOPHEN 325MG 650 MG: 325 TABLET ORAL at 02:34

## 2025-02-19 RX ADMIN — POLYETHYLENE GLYCOL 3350 17 G: 17 POWDER, FOR SOLUTION ORAL at 08:59

## 2025-02-19 RX ADMIN — BUDESONIDE AND FORMOTEROL FUMARATE DIHYDRATE 2 PUFF: 160; 4.5 AEROSOL RESPIRATORY (INHALATION) at 09:04

## 2025-02-19 RX ADMIN — BACLOFEN 5 MG: 10 TABLET ORAL at 09:00

## 2025-02-19 RX ADMIN — AMLODIPINE BESYLATE 10 MG: 10 TABLET ORAL at 09:00

## 2025-02-19 RX ADMIN — GABAPENTIN 600 MG: 300 CAPSULE ORAL at 21:33

## 2025-02-19 RX ADMIN — Medication 500 MCG: at 09:00

## 2025-02-19 RX ADMIN — ATORVASTATIN CALCIUM 80 MG: 80 TABLET, FILM COATED ORAL at 17:21

## 2025-02-19 RX ADMIN — ENOXAPARIN SODIUM 40 MG: 40 INJECTION SUBCUTANEOUS at 09:04

## 2025-02-19 RX ADMIN — BUDESONIDE AND FORMOTEROL FUMARATE DIHYDRATE 2 PUFF: 160; 4.5 AEROSOL RESPIRATORY (INHALATION) at 17:21

## 2025-02-19 RX ADMIN — MIRTAZAPINE 7.5 MG: 7.5 TABLET, FILM COATED ORAL at 21:32

## 2025-02-19 NOTE — PROGRESS NOTES
Progress Note - Hospitalist   Name: Mathew Rico 74 y.o. male I MRN: [id number]  Unit/Bed#: [place] [address] -01 I Date of Admission: 2/10/2025   Date of Service: 2/18/2025 I Hospital Day: 8    Assessment & Plan  Severe sepsis (HCC)  History of MS hypertension neurogenic bladder with suprapubic tube who presented to the hospital with generalized abdominal pain  Sepsis present on admission secondary to urinary tract infection  Urine culture: Enterococcus sensitive to ertapenem  Completed course.  Disposition: Plan for SNF placement  History of CVA (cerebrovascular accident)  History of stroke continue clopidogrel and atorvastatin  Primary hypertension  Continue amlodipine  Also on propranolol history of essential tremors  Type 2 diabetes mellitus without complication, without long-term current use of insulin (HCC)  Continue sliding scale during hospitalization    Results from last 7 days   Lab Units 02/18/25  1559 02/18/25  1119 02/18/25  0707 02/17/25  2143 02/17/25  1624   POC GLUCOSE mg/dl 147* 156* 142* 143* 168*     Multiple sclerosis (HCC)  MS with neurogenic bladder status post suprapubic tube  Continue baclofen and gabapentin  Urinary retention  Neurogenic bladder status post suprapubic tube    VTE Pharmacologic Prophylaxis: VTE Score: 3 Moderate Risk (Score 3-4) - Pharmacological DVT Prophylaxis Ordered: enoxaparin (Lovenox).    Mobility:   Basic Mobility Inpatient Raw Score: 9  JH-HLM Goal: 3: Sit at edge of bed  JH-HLM Achieved: 3: Sit at edge of bed  JH-HLM Goal achieved. Continue to encourage appropriate mobility.    Patient Centered Rounds: I have performed bedside rounds with nursing staff today.  Discussions with Specialists or Other Care Team Provider: Case management    Education and Discussions with Family / Patient:     Current Length of Stay: 8 day(s)  Current Patient Status: Inpatient   Certification Statement: The patient will continue to require additional inpatient hospital stay due to  awaiting placement  Discharge Plan: Anticipate discharge in 24-48 hrs to rehab facility.    Code Status: Level 1 - Full Code    Subjective   Patient seen and examined.  No complaints.  Working with therapy during examination    Objective   Vitals:   Temp (24hrs), Av.1 °F (37.3 °C), Min:98.6 °F (37 °C), Max:99.2 °F (37.3 °C)    Temp:  [98.6 °F (37 °C)-99.2 °F (37.3 °C)] 99.2 °F (37.3 °C)  HR:  [71-78] 71  Resp:  [14-18] 17  BP: (123-137)/(50-62) 124/50  SpO2:  [91 %-94 %] 91 %  Body mass index is 24.26 kg/m².     Input and Output Summary (last 24 hours):     Intake/Output Summary (Last 24 hours) at 2025 1922  Last data filed at 2025 1831  Gross per 24 hour   Intake 1500 ml   Output 600 ml   Net 900 ml       Physical Exam  Vitals reviewed.   Constitutional:       General: He is not in acute distress.  HENT:      Head: Atraumatic.   Cardiovascular:      Rate and Rhythm: Regular rhythm.   Pulmonary:      Effort: Pulmonary effort is normal.      Breath sounds: Decreased breath sounds present. No wheezing.   Abdominal:      General: Bowel sounds are normal.      Palpations: Abdomen is soft.      Tenderness: There is no abdominal tenderness.   Musculoskeletal:         General: No swelling.   Skin:     General: Skin is warm and dry.   Neurological:      General: No focal deficit present.      Mental Status: He is alert.   Psychiatric:         Mood and Affect: Mood normal.       Lines/Drains:  Invasive Devices       Peripheral Intravenous Line  Duration             Peripheral IV 25 Left;Ventral (anterior) Forearm 2 days              Drain  Duration             Suprapubic Catheter 20 Fr. 7 days                            Lab Results: I have reviewed the following results:   Results from last 7 days   Lab Units 25  0555 25  0646 25  0456   WBC Thousand/uL 6.91 9.53 12.88*   HEMOGLOBIN g/dL 12.5 12.6 13.1   PLATELETS Thousands/uL 300 267 265   MCV fL 88 87 89     Results from last 7 days    Lab Units 02/17/25  0555 02/14/25  0646 02/13/25  0637   SODIUM mmol/L 137 138 136   POTASSIUM mmol/L 3.8 3.9 3.9   CHLORIDE mmol/L 105 105 106   CO2 mmol/L 24 25 22   ANION GAP mmol/L 8 8 8   BUN mg/dL 16 12 9   CREATININE mg/dL 0.84 0.83 0.76   CALCIUM mg/dL 9.1 9.4 9.2   EGFR ml/min/1.73sq m 85 86 89   GLUCOSE RANDOM mg/dL 108 122 110     Results from last 7 days   Lab Units 02/17/25  0555 02/12/25  0508   MAGNESIUM mg/dL 1.6* 1.6*                      Results from last 7 days   Lab Units 02/18/25  1559 02/18/25  1119 02/18/25  0707 02/17/25  2143 02/17/25  1624 02/17/25  1115 02/17/25  0759 02/16/25  2036 02/16/25  1642 02/16/25  1114 02/16/25  0745 02/15/25  2101   POC GLUCOSE mg/dl 147* 156* 142* 143* 168* 153* 123 159* 133 167* 182* 187*               Recent Cultures (last 7 days):         Imaging:  Reviewed radiology reports from this admission including:  XR chest 2 views  Result Date: 2/11/2025  Impression: No acute cardiopulmonary disease. Resident: Liban Lauren I, the attending radiologist, have reviewed the images and agree with the final report above. Workstation performed: [id number]     CT abdomen pelvis with contrast  Result Date: 2/10/2025  Impression: Suprapubic catheter present. Chronic bladder wall thickening and perivesical stranding, mildly improved compared to January. Mild thickening about the distal rectum, also. Chronic. No new findings. Workstation performed: [id number]       Last 24 Hours Medication List:     Current Facility-Administered Medications:     acetaminophen (TYLENOL) tablet 650 mg, Q6H PRN    aluminum-magnesium hydroxide-simethicone (MAALOX) oral suspension 30 mL, Q4H PRN    amLODIPine (NORVASC) tablet 10 mg, Daily    atorvastatin (LIPITOR) tablet 80 mg, Daily With Dinner    baclofen tablet 5 mg, TID    budesonide-formoterol (SYMBICORT) 160-4.5 mcg/act inhaler 2 puff, BID    clopidogrel (PLAVIX) tablet 75 mg, Daily    cyanocobalamin (VITAMIN B-12) tablet 500 mcg, Daily     enoxaparin (LOVENOX) subcutaneous injection 40 mg, Daily    gabapentin (NEURONTIN) capsule 300 mg, BID    gabapentin (NEURONTIN) capsule 600 mg, HS    insulin lispro (HumALOG/ADMELOG) 100 units/mL subcutaneous injection 1-5 Units, TID AC **AND** Fingerstick Glucose (POCT), TID AC    insulin lispro (HumALOG/ADMELOG) 100 units/mL subcutaneous injection 1-5 Units, HS    latanoprost (XALATAN) 0.005 % ophthalmic solution 1 drop, HS    melatonin tablet 3 mg, HS PRN    mirtazapine (REMERON) tablet 7.5 mg, HS    nystatin (MYCOSTATIN) powder, BID    ondansetron (ZOFRAN) injection 4 mg, Q4H PRN    oxyCODONE (ROXICODONE) IR tablet 5 mg, Q6H PRN    pantoprazole (PROTONIX) EC tablet 40 mg, Early Morning    polyethylene glycol (MIRALAX) packet 17 g, Daily    propranolol (INDERAL LA) 24 hr capsule 60 mg, Daily    senna (SENOKOT) tablet 17.2 mg, HS    Administrative Statements   Today, Patient Was Seen By: Shon Mendez, DO  I have spent a total time of 35 minutes in caring for this patient on the day of the visit/encounter including Documenting in the medical record, Reviewing/placing orders in the medical record (including tests, medications, and/or procedures), Obtaining or reviewing history  , and Communicating with other healthcare professionals .    **Please Note: This note may have been constructed using a voice recognition system.**

## 2025-02-19 NOTE — ASSESSMENT & PLAN NOTE
Continue sliding scale during hospitalization    Results from last 7 days   Lab Units 02/19/25  1641 02/19/25  1130 02/19/25  0758 02/18/25 2056 02/18/25  1559   POC GLUCOSE mg/dl 131 162* 142* 139 147*

## 2025-02-19 NOTE — PLAN OF CARE
Problem: Potential for Falls  Goal: Patient will remain free of falls  Description: INTERVENTIONS:  - Educate patient/family on patient safety including physical limitations  - Instruct patient to call for assistance with activity   - Consult OT/PT to assist with strengthening/mobility   - Keep Call bell within reach  - Keep bed low and locked with side rails adjusted as appropriate  - Keep care items and personal belongings within reach  - Initiate and maintain comfort rounds  - Make Fall Risk Sign visible to staff  - Offer Toileting every 2 Hours, in advance of need  - Initiate/Maintain bed alarm  - Apply yellow socks and bracelet for high fall risk patients  - Consider moving patient to room near nurses station  Outcome: Progressing     Problem: PAIN - ADULT  Goal: Verbalizes/displays adequate comfort level or baseline comfort level  Description: Interventions:  - Encourage patient to monitor pain and request assistance  - Assess pain using appropriate pain scale  - Administer analgesics based on type and severity of pain and evaluate response  - Implement non-pharmacological measures as appropriate and evaluate response  - Consider cultural and social influences on pain and pain management  - Notify physician/advanced practitioner if interventions unsuccessful or patient reports new pain  Outcome: Progressing     Problem: INFECTION - ADULT  Goal: Absence or prevention of progression during hospitalization  Description: INTERVENTIONS:  - Assess and monitor for signs and symptoms of infection  - Monitor lab/diagnostic results  - Monitor all insertion sites, i.e. indwelling lines, tubes, and drains  - Monitor endotracheal if appropriate and nasal secretions for changes in amount and color  - [place] appropriate cooling/warming therapies per order  - Administer medications as ordered  - Instruct and encourage patient and family to use good hand hygiene technique  - Identify and instruct in appropriate isolation  precautions for identified infection/condition  Outcome: Progressing     Problem: DISCHARGE PLANNING  Goal: Discharge to home or other facility with appropriate resources  Description: INTERVENTIONS:  - Identify barriers to discharge w/patient and caregiver  - Arrange for needed discharge resources and transportation as appropriate  - Identify discharge learning needs (meds, wound care, etc.)  - Arrange for interpretive services to assist at discharge as needed  - Refer to Case Management Department for coordinating discharge planning if the patient needs post-hospital services based on physician/advanced practitioner order or complex needs related to functional status, cognitive ability, or social support system  Outcome: Progressing     Problem: Knowledge Deficit  Goal: Patient/family/caregiver demonstrates understanding of disease process, treatment plan, medications, and discharge instructions  Description: Complete learning assessment and assess knowledge base.  Interventions:  - Provide teaching at level of understanding  - Provide teaching via preferred learning methods  Outcome: Progressing     Problem: GASTROINTESTINAL - ADULT  Goal: Minimal or absence of nausea and/or vomiting  Description: INTERVENTIONS:  - Administer IV fluids if ordered to ensure adequate hydration  - Maintain NPO status until nausea and vomiting are resolved  - Nasogastric tube if ordered  - Administer ordered antiemetic medications as needed  - Provide nonpharmacologic comfort measures as appropriate  - Advance diet as tolerated, if ordered  - Consider nutrition services referral to assist patient with adequate nutrition and appropriate food choices  Outcome: Progressing  Goal: Maintains or returns to baseline bowel function  Description: INTERVENTIONS:  - Assess bowel function  - Encourage oral fluids to ensure adequate hydration  - Administer IV fluids if ordered to ensure adequate hydration  - Administer ordered medications as  needed  - Encourage mobilization and activity  - Consider nutritional services referral to assist patient with adequate nutrition and appropriate food choices  Outcome: Progressing  Goal: Maintains adequate nutritional intake  Description: INTERVENTIONS:  - Monitor percentage of each meal consumed  - Identify factors contributing to decreased intake, treat as appropriate  - Assist with meals as needed  - Monitor I&O, weight, and lab values if indicated  - Obtain nutrition services referral as needed  Outcome: Progressing     Problem: GENITOURINARY - ADULT  Goal: Maintains or returns to baseline urinary function  Description: INTERVENTIONS:  - Assess urinary function  - Encourage oral fluids to ensure adequate hydration if ordered  - Administer IV fluids as ordered to ensure adequate hydration  - Administer ordered medications as needed  - Offer frequent toileting  - Follow urinary retention protocol if ordered  Outcome: Progressing  Goal: Absence of urinary retention  Description: INTERVENTIONS:  - Assess patient's ability to void and empty bladder  - Monitor I/O  - Bladder scan as needed  - Discuss with physician/AP medications to alleviate retention as needed  - Discuss catheterization for long term situations as appropriate  Outcome: Progressing  Goal: Urinary catheter remains patent  Description: INTERVENTIONS:  - Assess patency of urinary catheter  - If patient has a chronic dela cruz, consider changing catheter if non-functioning  - Follow guidelines for intermittent irrigation of non-functioning urinary catheter  Outcome: Progressing     Problem: METABOLIC, FLUID AND ELECTROLYTES - ADULT  Goal: Electrolytes maintained within normal limits  Description: INTERVENTIONS:  - Monitor labs and assess patient for signs and symptoms of electrolyte imbalances  - Administer electrolyte replacement as ordered  - Monitor response to electrolyte replacements, including repeat lab results as appropriate  - Instruct patient on  fluid and nutrition as appropriate  Outcome: Progressing  Goal: Fluid balance maintained  Description: INTERVENTIONS:  - Monitor labs   - Monitor I/O and WT  - Instruct patient on fluid and nutrition as appropriate  - Assess for signs & symptoms of volume excess or deficit  Outcome: Progressing  Goal: Glucose maintained within target range  Description: INTERVENTIONS:  - Monitor Blood Glucose as ordered  - Assess for signs and symptoms of hyperglycemia and hypoglycemia  - Administer ordered medications to maintain glucose within target range  - Assess nutritional intake and initiate nutrition service referral as needed  Outcome: Progressing     Problem: Prexisting or High Potential for Compromised Skin Integrity  Goal: Skin integrity is maintained or improved  Description: INTERVENTIONS:  - Identify patients at risk for skin breakdown  - Assess and monitor skin integrity  - Assess and monitor nutrition and hydration status  - Monitor labs   - Assess for incontinence   - Turn and reposition patient  - Assist with mobility/ambulation  - Relieve pressure over bony prominences  - Avoid friction and shearing  - Provide appropriate hygiene as needed including keeping skin clean and dry  - Evaluate need for skin moisturizer/barrier cream  - Collaborate with interdisciplinary team   - Patient/family teaching  - Consider wound care consult   Outcome: Progressing     Problem: Nutrition/Hydration-ADULT  Goal: Nutrient/Hydration intake appropriate for improving, restoring or maintaining nutritional needs  Description: Monitor and assess patient's nutrition/hydration status for malnutrition. Collaborate with interdisciplinary team and initiate plan and interventions as ordered.  Monitor patient's weight and dietary intake as ordered or per policy. Utilize nutrition screening tool and intervene as necessary. Determine patient's food preferences and provide high-protein, high-caloric foods as appropriate.     INTERVENTIONS:  -  Monitor oral intake, urinary output, labs, and treatment plans  - Assess nutrition and hydration status and recommend course of action  - Evaluate amount of meals eaten  - Assist patient with eating if necessary   - Allow adequate time for meals  - Recommend/ encourage appropriate diets, oral nutritional supplements, and vitamin/mineral supplements  - Order, calculate, and assess calorie counts as needed  - Recommend, monitor, and adjust tube feedings and TPN/PPN based on assessed needs  - Assess need for intravenous fluids  - Provide specific nutrition/hydration education as appropriate  - Include patient/family/caregiver in decisions related to nutrition  Outcome: Progressing

## 2025-02-19 NOTE — PLAN OF CARE
Problem: OCCUPATIONAL THERAPY ADULT  Goal: Performs self-care activities at highest level of function for planned discharge setting.  See evaluation for individualized goals.  Description: Treatment Interventions: ADL retraining, Functional transfer training, UE strengthening/ROM, Endurance training, Patient/family training, Equipment evaluation/education, Compensatory technique education, Continued evaluation, Activityengagement          See flowsheet documentation for full assessment, interventions and recommendations.   Outcome: Progressing  Note: Limitation: Decreased ADL status, Decreased UE strength, Decreased Safe judgement during ADL, Decreased cognition, Decreased endurance, Decreased self-care trans, Decreased high-level ADLs, Decreased sensation  Prognosis: Fair  Assessment: Pt seen for OT treatment session focusing on functional activity tolerance, bed mobility, ADLs, UE ROM/strengthening, and Safe transfer techniques. Pt alert and cooperative throughout session. Pt lying supine at start of session. UE exercises completed while lying supine to increase UE ROM/strength needed for ADLs/transfers. Pt tolerated well, no c/o pain or fatigue. Verbal cues for proper form/pacing. UB ADLs completed w/ Min A with assist for thoroughness for UB bathing and assist to bring gown over shldrs/down in back for UB dressing. LB dressing completed w/ Total A 2* impaired functional reach demonstrated. Bed mobility (supine<>sit) completed w/ Max A of 2 with assist for LE management and trunk support. Pt able to sit EOB ~2 mins w/ Poor- dynamic sitting balance, requiring Max-Mod A to maintain upright seated position. Pt lying supine with bed alarm activated at end of session. Call bell and phone within reach. All needs met and pt reports no further questions for OT at this time. The patient's raw score on the -PAC Daily Activity Inpatient Short Form is 15. A raw score of less than 19 suggests the patient may benefit from  discharge to post-acute rehabilitation services. Please refer to the recommendation of the Occupational Therapist for safe discharge planning. OT to follow pt on caseload.     Rehab Resource Intensity Level, OT: II (Moderate Resource Intensity)

## 2025-02-19 NOTE — PROGRESS NOTES
Progress Note - Hospitalist   Name: Mathew Rico 74 y.o. male I MRN: [id number]  Unit/Bed#: [place] [address] -01 I Date of Admission: 2/10/2025   Date of Service: 2/19/2025 I Hospital Day: 9    Assessment & Plan  Severe sepsis (HCC)  History of MS hypertension neurogenic bladder with suprapubic tube who presented to the hospital with generalized abdominal pain  Sepsis present on admission secondary to urinary tract infection  Urine culture: Enterococcus sensitive to ertapenem  Completed course.  Disposition: Plan for SNF placement  History of CVA (cerebrovascular accident)  History of stroke continue clopidogrel and atorvastatin  Primary hypertension  Continue amlodipine  Also on propranolol for history of essential tremors  Type 2 diabetes mellitus without complication, without long-term current use of insulin (HCC)  Continue sliding scale during hospitalization    Results from last 7 days   Lab Units 02/19/25  1641 02/19/25  1130 02/19/25  0758 02/18/25  2056 02/18/25  1559   POC GLUCOSE mg/dl 131 162* 142* 139 147*     Multiple sclerosis (HCC)  MS with neurogenic bladder status post suprapubic tube  Continue baclofen and gabapentin  Urinary retention  Neurogenic bladder status post suprapubic tube    VTE Pharmacologic Prophylaxis: VTE Score: 3 Moderate Risk (Score 3-4) - Pharmacological DVT Prophylaxis Ordered: enoxaparin (Lovenox).    Mobility:   Basic Mobility Inpatient Raw Score: 8  JH-HLM Goal: 3: Sit at edge of bed  JH-HLM Achieved: 2: Bed activities/Dependent transfer  JH-HLM Goal NOT achieved. Continue with multidisciplinary rounding and encourage appropriate mobility to improve upon JH-HLM goals.    Patient Centered Rounds: I have performed bedside rounds with nursing staff today.  Discussions with Specialists or Other Care Team Provider: Case management    Education and Discussions with Family / Patient: Updated  (brother) via phone.    Current Length of Stay: 9 day(s)  Current Patient  Status: Inpatient   Certification Statement: The patient will continue to require additional inpatient hospital stay due to placement  Discharge Plan: Anticipate discharge in 24-48 hrs to rehab facility.    Code Status: Level 1 - Full Code    Subjective   Patient seen and examined.  No complaints.  Hoping for more exercise.    Objective   Vitals:   Temp (24hrs), Av.7 °F (37.1 °C), Min:98.5 °F (36.9 °C), Max:98.8 °F (37.1 °C)    Temp:  [98.5 °F (36.9 °C)-98.8 °F (37.1 °C)] 98.5 °F (36.9 °C)  HR:  [71-74] 72  Resp:  [16-18] 18  BP: (123-128)/(49-53) 128/53  SpO2:  [92 %-94 %] 94 %  Body mass index is 24.26 kg/m².     Input and Output Summary (last 24 hours):     Intake/Output Summary (Last 24 hours) at 2025 1735  Last data filed at 2025 0929  Gross per 24 hour   Intake 590 ml   Output 1400 ml   Net -810 ml       Physical Exam  Vitals reviewed.   Constitutional:       General: He is not in acute distress.  HENT:      Head: Atraumatic.   Cardiovascular:      Rate and Rhythm: Regular rhythm.   Pulmonary:      Effort: Pulmonary effort is normal.      Breath sounds: Decreased breath sounds present. No wheezing.   Abdominal:      General: Bowel sounds are normal.      Palpations: Abdomen is soft.      Tenderness: There is no abdominal tenderness.   Musculoskeletal:         General: No swelling.   Skin:     General: Skin is warm and dry.   Neurological:      Mental Status: He is alert. Mental status is at baseline.   Psychiatric:         Mood and Affect: Mood normal.         Behavior: Behavior normal.       Lines/Drains:  Invasive Devices       Peripheral Intravenous Line  Duration             Peripheral IV 25 Left;Ventral (anterior) Forearm 3 days              Drain  Duration             Suprapubic Catheter 20 Fr. 8 days                            Lab Results: I have reviewed the following results:   Results from last 7 days   Lab Units 25  0538 25  0555 25  0646   WBC Thousand/uL 8.90  6.91 9.53   HEMOGLOBIN g/dL 11.8* 12.5 12.6   PLATELETS Thousands/uL 285 300 267   MCV fL 86 88 87     Results from last 7 days   Lab Units 02/19/25  0538 02/17/25  0555 02/14/25  0646   SODIUM mmol/L 138 137 138   POTASSIUM mmol/L 3.8 3.8 3.9   CHLORIDE mmol/L 107 105 105   CO2 mmol/L 23 24 25   ANION GAP mmol/L 8 8 8   BUN mg/dL 11 16 12   CREATININE mg/dL 0.72 0.84 0.83   CALCIUM mg/dL 8.7 9.1 9.4   ALBUMIN g/dL 3.3*  --   --    TOTAL BILIRUBIN mg/dL 0.27  --   --    ALK PHOS U/L 62  --   --    ALT U/L 12  --   --    AST U/L 19  --   --    EGFR ml/min/1.73sq m 91 85 86   GLUCOSE RANDOM mg/dL 149* 108 122     Results from last 7 days   Lab Units 02/19/25  0538 02/17/25  0555   MAGNESIUM mg/dL 1.7* 1.6*   PHOSPHORUS mg/dL 3.0  --                       Results from last 7 days   Lab Units 02/19/25  1641 02/19/25  1130 02/19/25  0758 02/18/25  2056 02/18/25  1559 02/18/25  1119 02/18/25  0707 02/17/25  2143 02/17/25  1624 02/17/25  1115 02/17/25  0759 02/16/25  2036   POC GLUCOSE mg/dl 131 162* 142* 139 147* 156* 142* 143* 168* 153* 123 159*               Recent Cultures (last 7 days):         Imaging:  Reviewed radiology reports from this admission including:  XR chest 2 views  Result Date: 2/11/2025  Impression: No acute cardiopulmonary disease. Resident: Liban Lauren I, the attending radiologist, have reviewed the images and agree with the final report above. Workstation performed: [id number]     CT abdomen pelvis with contrast  Result Date: 2/10/2025  Impression: Suprapubic catheter present. Chronic bladder wall thickening and perivesical stranding, mildly improved compared to January. Mild thickening about the distal rectum, also. Chronic. No new findings. Workstation performed: [id number]       Last 24 Hours Medication List:     Current Facility-Administered Medications:     acetaminophen (TYLENOL) tablet 650 mg, Q6H PRN    aluminum-magnesium hydroxide-simethicone (MAALOX) oral suspension 30 mL, Q4H PRN     amLODIPine (NORVASC) tablet 10 mg, Daily    atorvastatin (LIPITOR) tablet 80 mg, Daily With Dinner    baclofen tablet 5 mg, TID    budesonide-formoterol (SYMBICORT) 160-4.5 mcg/act inhaler 2 puff, BID    clopidogrel (PLAVIX) tablet 75 mg, Daily    cyanocobalamin (VITAMIN B-12) tablet 500 mcg, Daily    enoxaparin (LOVENOX) subcutaneous injection 40 mg, Daily    gabapentin (NEURONTIN) capsule 300 mg, BID    gabapentin (NEURONTIN) capsule 600 mg, HS    insulin lispro (HumALOG/ADMELOG) 100 units/mL subcutaneous injection 1-5 Units, TID AC **AND** Fingerstick Glucose (POCT), TID AC    insulin lispro (HumALOG/ADMELOG) 100 units/mL subcutaneous injection 1-5 Units, HS    latanoprost (XALATAN) 0.005 % ophthalmic solution 1 drop, HS    melatonin tablet 3 mg, HS PRN    mirtazapine (REMERON) tablet 7.5 mg, HS    nystatin (MYCOSTATIN) powder, BID    ondansetron (ZOFRAN) injection 4 mg, Q4H PRN    oxyCODONE (ROXICODONE) IR tablet 5 mg, Q6H PRN    pantoprazole (PROTONIX) EC tablet 40 mg, Early Morning    polyethylene glycol (MIRALAX) packet 17 g, Daily    propranolol (INDERAL LA) 24 hr capsule 60 mg, Daily    senna (SENOKOT) tablet 17.2 mg, HS    Administrative Statements   Today, Patient Was Seen By: Shon Mendez, DO  I have spent a total time of 35 minutes in caring for this patient on the day of the visit/encounter including Patient and family education, Counseling / Coordination of care, Reviewing/placing orders in the medical record (including tests, medications, and/or procedures), Obtaining or reviewing history  , and Communicating with other healthcare professionals .    **Please Note: This note may have been constructed using a voice recognition system.**

## 2025-02-19 NOTE — PLAN OF CARE
Problem: Potential for Falls  Goal: Patient will remain free of falls  Description: INTERVENTIONS:  - Educate patient/family on patient safety including physical limitations  - Instruct patient to call for assistance with activity   - Consult OT/PT to assist with strengthening/mobility   - Keep Call bell within reach  - Keep bed low and locked with side rails adjusted as appropriate  - Keep care items and personal belongings within reach  - Initiate and maintain comfort rounds  - Make Fall Risk Sign visible to staff  - Offer Toileting every 2 Hours, in advance of need  - Initiate/Maintain bed alarm  - Apply yellow socks and bracelet for high fall risk patients  - Consider moving patient to room near nurses station  Outcome: Progressing     Problem: PAIN - ADULT  Goal: Verbalizes/displays adequate comfort level or baseline comfort level  Description: Interventions:  - Encourage patient to monitor pain and request assistance  - Assess pain using appropriate pain scale  - Administer analgesics based on type and severity of pain and evaluate response  - Implement non-pharmacological measures as appropriate and evaluate response  - Consider cultural and social influences on pain and pain management  - Notify physician/advanced practitioner if interventions unsuccessful or patient reports new pain  Outcome: Progressing     Problem: INFECTION - ADULT  Goal: Absence or prevention of progression during hospitalization  Description: INTERVENTIONS:  - Assess and monitor for signs and symptoms of infection  - Monitor lab/diagnostic results  - Monitor all insertion sites, i.e. indwelling lines, tubes, and drains  - Monitor endotracheal if appropriate and nasal secretions for changes in amount and color  - [place] appropriate cooling/warming therapies per order  - Administer medications as ordered  - Instruct and encourage patient and family to use good hand hygiene technique  - Identify and instruct in appropriate isolation  precautions for identified infection/condition  Outcome: Progressing     Problem: DISCHARGE PLANNING  Goal: Discharge to home or other facility with appropriate resources  Description: INTERVENTIONS:  - Identify barriers to discharge w/patient and caregiver  - Arrange for needed discharge resources and transportation as appropriate  - Identify discharge learning needs (meds, wound care, etc.)  - Arrange for interpretive services to assist at discharge as needed  - Refer to Case Management Department for coordinating discharge planning if the patient needs post-hospital services based on physician/advanced practitioner order or complex needs related to functional status, cognitive ability, or social support system  Outcome: Progressing     Problem: Knowledge Deficit  Goal: Patient/family/caregiver demonstrates understanding of disease process, treatment plan, medications, and discharge instructions  Description: Complete learning assessment and assess knowledge base.  Interventions:  - Provide teaching at level of understanding  - Provide teaching via preferred learning methods  Outcome: Progressing     Problem: GASTROINTESTINAL - ADULT  Goal: Minimal or absence of nausea and/or vomiting  Description: INTERVENTIONS:  - Administer IV fluids if ordered to ensure adequate hydration  - Maintain NPO status until nausea and vomiting are resolved  - Nasogastric tube if ordered  - Administer ordered antiemetic medications as needed  - Provide nonpharmacologic comfort measures as appropriate  - Advance diet as tolerated, if ordered  - Consider nutrition services referral to assist patient with adequate nutrition and appropriate food choices  Outcome: Progressing  Goal: Maintains or returns to baseline bowel function  Description: INTERVENTIONS:  - Assess bowel function  - Encourage oral fluids to ensure adequate hydration  - Administer IV fluids if ordered to ensure adequate hydration  - Administer ordered medications as  needed  - Encourage mobilization and activity  - Consider nutritional services referral to assist patient with adequate nutrition and appropriate food choices  Outcome: Progressing  Goal: Maintains adequate nutritional intake  Description: INTERVENTIONS:  - Monitor percentage of each meal consumed  - Identify factors contributing to decreased intake, treat as appropriate  - Assist with meals as needed  - Monitor I&O, weight, and lab values if indicated  - Obtain nutrition services referral as needed  Outcome: Progressing     Problem: GENITOURINARY - ADULT  Goal: Maintains or returns to baseline urinary function  Description: INTERVENTIONS:  - Assess urinary function  - Encourage oral fluids to ensure adequate hydration if ordered  - Administer IV fluids as ordered to ensure adequate hydration  - Administer ordered medications as needed  - Offer frequent toileting  - Follow urinary retention protocol if ordered  Outcome: Progressing  Goal: Absence of urinary retention  Description: INTERVENTIONS:  - Assess patient's ability to void and empty bladder  - Monitor I/O  - Bladder scan as needed  - Discuss with physician/AP medications to alleviate retention as needed  - Discuss catheterization for long term situations as appropriate  Outcome: Progressing  Goal: Urinary catheter remains patent  Description: INTERVENTIONS:  - Assess patency of urinary catheter  - If patient has a chronic dela cruz, consider changing catheter if non-functioning  - Follow guidelines for intermittent irrigation of non-functioning urinary catheter  Outcome: Progressing     Problem: METABOLIC, FLUID AND ELECTROLYTES - ADULT  Goal: Electrolytes maintained within normal limits  Description: INTERVENTIONS:  - Monitor labs and assess patient for signs and symptoms of electrolyte imbalances  - Administer electrolyte replacement as ordered  - Monitor response to electrolyte replacements, including repeat lab results as appropriate  - Instruct patient on  fluid and nutrition as appropriate  Outcome: Progressing  Goal: Fluid balance maintained  Description: INTERVENTIONS:  - Monitor labs   - Monitor I/O and WT  - Instruct patient on fluid and nutrition as appropriate  - Assess for signs & symptoms of volume excess or deficit  Outcome: Progressing  Goal: Glucose maintained within target range  Description: INTERVENTIONS:  - Monitor Blood Glucose as ordered  - Assess for signs and symptoms of hyperglycemia and hypoglycemia  - Administer ordered medications to maintain glucose within target range  - Assess nutritional intake and initiate nutrition service referral as needed  Outcome: Progressing     Problem: Prexisting or High Potential for Compromised Skin Integrity  Goal: Skin integrity is maintained or improved  Description: INTERVENTIONS:  - Identify patients at risk for skin breakdown  - Assess and monitor skin integrity  - Assess and monitor nutrition and hydration status  - Monitor labs   - Assess for incontinence   - Turn and reposition patient  - Assist with mobility/ambulation  - Relieve pressure over bony prominences  - Avoid friction and shearing  - Provide appropriate hygiene as needed including keeping skin clean and dry  - Evaluate need for skin moisturizer/barrier cream  - Collaborate with interdisciplinary team   - Patient/family teaching  - Consider wound care consult   Outcome: Progressing     Problem: Nutrition/Hydration-ADULT  Goal: Nutrient/Hydration intake appropriate for improving, restoring or maintaining nutritional needs  Description: Monitor and assess patient's nutrition/hydration status for malnutrition. Collaborate with interdisciplinary team and initiate plan and interventions as ordered.  Monitor patient's weight and dietary intake as ordered or per policy. Utilize nutrition screening tool and intervene as necessary. Determine patient's food preferences and provide high-protein, high-caloric foods as appropriate.     INTERVENTIONS:  -  Monitor oral intake, urinary output, labs, and treatment plans  - Assess nutrition and hydration status and recommend course of action  - Evaluate amount of meals eaten  - Assist patient with eating if necessary   - Allow adequate time for meals  - Recommend/ encourage appropriate diets, oral nutritional supplements, and vitamin/mineral supplements  - Order, calculate, and assess calorie counts as needed  - Recommend, monitor, and adjust tube feedings and TPN/PPN based on assessed needs  - Assess need for intravenous fluids  - Provide specific nutrition/hydration education as appropriate  - Include patient/family/caregiver in decisions related to nutrition  Outcome: Progressing

## 2025-02-19 NOTE — ASSESSMENT & PLAN NOTE
History of MS hypertension neurogenic bladder with suprapubic tube who presented to the hospital with generalized abdominal pain  Sepsis present on admission secondary to urinary tract infection  Urine culture: Enterococcus sensitive to ertapenem  Completed course.  Disposition: Plan for SNF placement

## 2025-02-19 NOTE — OCCUPATIONAL THERAPY NOTE
Occupational Therapy Progress Note     Patient Name: Mathew Rico  Today's Date: 2/19/2025  Problem List  Principal Problem:    Severe sepsis (HCC)  Active Problems:    Primary hypertension    History of CVA (cerebrovascular accident)    Type 2 diabetes mellitus without complication, without long-term current use of insulin (HCC)    Multiple sclerosis (HCC)    Urinary retention        02/19/25 1112   OT Last Visit   OT Visit Date 02/19/25   Note Type   Note Type Treatment   Pain Assessment   Pain Assessment Tool 0-10   Pain Score 8   Pain Location/Orientation Orientation: Right;Location: Groin   Patient's Stated Pain Goal No pain   Hospital Pain Intervention(s) Repositioned;Ambulation/increased activity;Emotional support;Rest   Multiple Pain Sites No   Restrictions/Precautions   Weight Bearing Precautions Per Order No   Other Precautions Contact/isolation;Bed Alarm;Fall Risk;Pain   ADL   Grooming Assistance 5  Supervision/Setup   Grooming Deficit Setup;Supervision/safety;Increased time to complete   UB Bathing Assistance 4  Minimal Assistance   UB Bathing Deficit Setup;Verbal cueing;Supervision/safety;Increased time to complete   LB Bathing Assistance 2  Maximal Assistance   LB Bathing Deficit Setup;Verbal cueing;Supervision/safety;Increased time to complete;Perineal area;Buttocks;Right lower leg including foot;Left lower leg including foot   UB Dressing Assistance 4  Minimal Assistance   UB Dressing Deficit Setup;Verbal cueing;Supervision/safety;Increased time to complete;Pull around back;Pull down in back   LB Dressing Assistance 1  Total Assistance   LB Dressing Deficit Setup;Don/doff R sock;Don/doff L sock   Bed Mobility   Supine to Sit 2  Maximal assistance   Additional items Assist x 2;HOB elevated;Bedrails;Increased time required;Verbal cues;LE management  (trunk support)   Sit to Supine 2  Maximal assistance   Additional items Assist x 2;Increased time required;Verbal cues;LE management  (trunk  support)   Additional Comments Pt lying supine with bed alarm activated at end of session. Call bell and phone within reach. All needs met and pt reports no further questions for OT at this time.   Therapeutic Excerise-Strength   UE Strength Yes   Right Upper Extremity- Strength   R Shoulder Flexion;Extension;Horizontal ABduction   R Elbow Elbow flexion;Elbow extension   R Position Supine   R Weight/Reps/Sets 2 sets of 10 reps   Left Upper Extremity-Strength   L Shoulder Flexion;Extension;Horizontal ABduction   L Elbow Elbow flexion;Elbow extension   L Position Supine   L Weights/Reps/Sets 2 sets of 10 reps   Cognition   Overall Cognitive Status Impaired   Arousal/Participation Alert;Cooperative   Attention Attends with cues to redirect   Orientation Level Oriented to person;Oriented to place;Disoriented to time;Disoriented to situation   Memory Decreased recall of precautions;Decreased recall of recent events;Decreased short term memory   Following Commands Follows one step commands with increased time or repetition   Activity Tolerance   Activity Tolerance Patient limited by pain   Assessment   Assessment Pt seen for OT treatment session focusing on functional activity tolerance, bed mobility, ADLs, UE ROM/strengthening, and Safe transfer techniques. Pt alert and cooperative throughout session. Pt lying supine at start of session. UE exercises completed while lying supine to increase UE ROM/strength needed for ADLs/transfers. Pt tolerated well, no c/o pain or fatigue. Verbal cues for proper form/pacing. UB ADLs completed w/ Min A with assist for thoroughness for UB bathing and assist to bring gown over shldrs/down in back for UB dressing. LB dressing completed w/ Total A 2* impaired functional reach demonstrated. Bed mobility (supine<>sit) completed w/ Max A of 2 with assist for LE management and trunk support. Pt able to sit EOB ~2 mins w/ Poor- dynamic sitting balance, requiring Max-Mod A to maintain upright  seated position. Pt lying supine with bed alarm activated at end of session. Call bell and phone within reach. All needs met and pt reports no further questions for OT at this time. The patient's raw score on the AM-PAC Daily Activity Inpatient Short Form is 15. A raw score of less than 19 suggests the patient may benefit from discharge to post-acute rehabilitation services. Please refer to the recommendation of the Occupational Therapist for safe discharge planning. OT to follow pt on caseload.   Plan   Treatment Interventions ADL retraining;Functional transfer training;Endurance training;Equipment evaluation/education;Patient/family training;Compensatory technique education;Continued evaluation;Activityengagement   Goal Expiration Date 02/28/25   OT Treatment Day 1   OT Frequency 1-2x/wk   Discharge Recommendation   Rehab Resource Intensity Level, OT II (Moderate Resource Intensity)   AM-PAC Daily Activity Inpatient   Lower Body Dressing 2   Bathing 2   Toileting 2   Upper Body Dressing 3   Grooming 3   Eating 3   Daily Activity Raw Score 15   Daily Activity Standardized Score (Calc for Raw Score >=11) 34.69   AM-PAC Applied Cognition Inpatient   Following a Speech/Presentation 3   Understanding Ordinary Conversation 3   Taking Medications 1   Remembering Where Things Are Placed or Put Away 2   Remembering List of 4-5 Errands 1   Taking Care of Complicated Tasks 1   Applied Cognition Raw Score 11   Applied Cognition Standardized Score 27.03       Kary Cruz OTR/L

## 2025-02-19 NOTE — ASSESSMENT & PLAN NOTE
Continue sliding scale during hospitalization    Results from last 7 days   Lab Units 02/18/25  1559 02/18/25  1119 02/18/25  0707 02/17/25  2143 02/17/25  1624   POC GLUCOSE mg/dl 147* 156* 142* 143* 168*

## 2025-02-20 ENCOUNTER — TELEPHONE (OUTPATIENT)
Dept: OTHER | Facility: HOSPITAL | Age: 75
End: 2025-02-20

## 2025-02-20 ENCOUNTER — PATIENT OUTREACH (OUTPATIENT)
Dept: CASE MANAGEMENT | Facility: OTHER | Age: 75
End: 2025-02-20

## 2025-02-20 LAB
GLUCOSE SERPL-MCNC: 126 MG/DL (ref 65–140)
GLUCOSE SERPL-MCNC: 162 MG/DL (ref 65–140)
GLUCOSE SERPL-MCNC: 196 MG/DL (ref 65–140)
GLUCOSE SERPL-MCNC: 240 MG/DL (ref 65–140)

## 2025-02-20 PROCEDURE — 82948 REAGENT STRIP/BLOOD GLUCOSE: CPT

## 2025-02-20 PROCEDURE — 99232 SBSQ HOSP IP/OBS MODERATE 35: CPT | Performed by: INTERNAL MEDICINE

## 2025-02-20 RX ADMIN — SENNOSIDES 17.2 MG: 8.6 TABLET ORAL at 22:01

## 2025-02-20 RX ADMIN — NYSTATIN: 100000 POWDER TOPICAL at 17:19

## 2025-02-20 RX ADMIN — GABAPENTIN 300 MG: 300 CAPSULE ORAL at 08:45

## 2025-02-20 RX ADMIN — MIRTAZAPINE 7.5 MG: 7.5 TABLET, FILM COATED ORAL at 22:01

## 2025-02-20 RX ADMIN — NYSTATIN: 100000 POWDER TOPICAL at 08:47

## 2025-02-20 RX ADMIN — BACLOFEN 5 MG: 10 TABLET ORAL at 22:00

## 2025-02-20 RX ADMIN — AMLODIPINE BESYLATE 10 MG: 10 TABLET ORAL at 08:45

## 2025-02-20 RX ADMIN — ATORVASTATIN CALCIUM 80 MG: 80 TABLET, FILM COATED ORAL at 17:17

## 2025-02-20 RX ADMIN — BACLOFEN 5 MG: 10 TABLET ORAL at 08:46

## 2025-02-20 RX ADMIN — PROPRANOLOL HYDROCHLORIDE 60 MG: 60 CAPSULE, EXTENDED RELEASE ORAL at 08:45

## 2025-02-20 RX ADMIN — GABAPENTIN 300 MG: 300 CAPSULE ORAL at 17:17

## 2025-02-20 RX ADMIN — PANTOPRAZOLE SODIUM 40 MG: 40 TABLET, DELAYED RELEASE ORAL at 05:51

## 2025-02-20 RX ADMIN — GABAPENTIN 600 MG: 300 CAPSULE ORAL at 22:00

## 2025-02-20 RX ADMIN — BUDESONIDE AND FORMOTEROL FUMARATE DIHYDRATE 2 PUFF: 160; 4.5 AEROSOL RESPIRATORY (INHALATION) at 17:15

## 2025-02-20 RX ADMIN — POLYETHYLENE GLYCOL 3350 17 G: 17 POWDER, FOR SOLUTION ORAL at 08:42

## 2025-02-20 RX ADMIN — CLOPIDOGREL BISULFATE 75 MG: 75 TABLET ORAL at 08:45

## 2025-02-20 RX ADMIN — Medication 500 MCG: at 08:45

## 2025-02-20 RX ADMIN — BUDESONIDE AND FORMOTEROL FUMARATE DIHYDRATE 2 PUFF: 160; 4.5 AEROSOL RESPIRATORY (INHALATION) at 08:44

## 2025-02-20 RX ADMIN — INSULIN LISPRO 1 UNITS: 100 INJECTION, SOLUTION INTRAVENOUS; SUBCUTANEOUS at 22:02

## 2025-02-20 RX ADMIN — INSULIN LISPRO 1 UNITS: 100 INJECTION, SOLUTION INTRAVENOUS; SUBCUTANEOUS at 17:16

## 2025-02-20 RX ADMIN — ENOXAPARIN SODIUM 40 MG: 40 INJECTION SUBCUTANEOUS at 08:43

## 2025-02-20 RX ADMIN — BACLOFEN 5 MG: 10 TABLET ORAL at 17:17

## 2025-02-20 RX ADMIN — INSULIN LISPRO 2 UNITS: 100 INJECTION, SOLUTION INTRAVENOUS; SUBCUTANEOUS at 13:03

## 2025-02-20 RX ADMIN — LATANOPROST 1 DROP: 50 SOLUTION OPHTHALMIC at 22:03

## 2025-02-20 NOTE — PROGRESS NOTES
Progress Note - Hospitalist   Name: Mathew Rico 74 y.o. male I MRN: [id number]  Unit/Bed#: [place] [address] -01 I Date of Admission: 2/10/2025   Date of Service: 2/20/2025 I Hospital Day: 10    Assessment & Plan  Severe sepsis (HCC)  History of MS hypertension neurogenic bladder with suprapubic tube who presented to the hospital with generalized abdominal pain  Sepsis present on admission secondary to urinary tract infection  UTI due to urinary catheter.  SPT was changed in the ED 2/10/2025  Urine culture: Enterococcus sensitive to ertapenem  Completed course.  Disposition: Plan for SNF placement  History of CVA (cerebrovascular accident)  History of stroke continue clopidogrel and atorvastatin  Primary hypertension  Continue amlodipine  Also on propranolol for history of essential tremors  Type 2 diabetes mellitus without complication, without long-term current use of insulin (HCC)  Continue sliding scale during hospitalization    Results from last 7 days   Lab Units 02/20/25  1129 02/20/25  0707 02/19/25  2108 02/19/25  1641 02/19/25  1130   POC GLUCOSE mg/dl 240* 126 161* 131 162*     Multiple sclerosis (HCC)  MS with neurogenic bladder status post suprapubic tube  Continue baclofen and gabapentin  Urinary retention  Neurogenic bladder status post suprapubic tube  In the ED 2/10/2025    VTE Pharmacologic Prophylaxis: VTE Score: 3 Moderate Risk (Score 3-4) - Pharmacological DVT Prophylaxis Ordered: enoxaparin (Lovenox).    Mobility:   Basic Mobility Inpatient Raw Score: 8  JH-HLM Goal: 3: Sit at edge of bed  JH-HLM Achieved: 2: Bed activities/Dependent transfer  JH-HLM Goal NOT achieved. Continue with multidisciplinary rounding and encourage appropriate mobility to improve upon JH-HLM goals.    Patient Centered Rounds: I have performed bedside rounds with nursing staff today.  Discussions with Specialists or Other Care Team Provider: Case management    Education and Discussions with Family / Patient:     Current  Length of Stay: 10 day(s)  Current Patient Status: Inpatient   Certification Statement: The patient will continue to require additional inpatient hospital stay due to rehab placement  Discharge Plan:  Medically stable awaiting placement    Code Status: Level 1 - Full Code    Subjective   Patient seen and examined.  No complaints, asking about timing of discharge    Objective   Vitals:   Temp (24hrs), Av.9 °F (37.2 °C), Min:98.7 °F (37.1 °C), Max:99.2 °F (37.3 °C)    Temp:  [98.7 °F (37.1 °C)-99.2 °F (37.3 °C)] 98.7 °F (37.1 °C)  HR:  [64-75] 75  Resp:  [18] 18  BP: (125-143)/(51-59) 143/59  SpO2:  [91 %-95 %] 95 %  Body mass index is 24.26 kg/m².     Input and Output Summary (last 24 hours):     Intake/Output Summary (Last 24 hours) at 2025 1636  Last data filed at 2025 1347  Gross per 24 hour   Intake 1390 ml   Output 1100 ml   Net 290 ml       Physical Exam  Vitals reviewed.   Constitutional:       General: He is not in acute distress.  HENT:      Head: Atraumatic.   Eyes:      Extraocular Movements: Extraocular movements intact.   Cardiovascular:      Rate and Rhythm: Regular rhythm.   Pulmonary:      Effort: Pulmonary effort is normal.      Breath sounds: No wheezing.   Abdominal:      General: Bowel sounds are normal.      Palpations: Abdomen is soft.      Tenderness: There is no abdominal tenderness.   Musculoskeletal:         General: No swelling or tenderness.   Skin:     General: Skin is warm and dry.   Neurological:      Mental Status: He is alert. Mental status is at baseline.      Motor: Weakness present.   Psychiatric:         Mood and Affect: Mood normal.       Lines/Drains:  Invasive Devices       Peripheral Intravenous Line  Duration             Peripheral IV 25 Dorsal (posterior);Left Hand <1 day              Drain  Duration             Suprapubic Catheter 20 Fr. 9 days                            Lab Results: I have reviewed the following results:   Results from last 7 days   Lab  Units 02/19/25  0538 02/17/25  0555 02/14/25  0646   WBC Thousand/uL 8.90 6.91 9.53   HEMOGLOBIN g/dL 11.8* 12.5 12.6   PLATELETS Thousands/uL 285 300 267   MCV fL 86 88 87     Results from last 7 days   Lab Units 02/19/25  0538 02/17/25  0555 02/14/25  0646   SODIUM mmol/L 138 137 138   POTASSIUM mmol/L 3.8 3.8 3.9   CHLORIDE mmol/L 107 105 105   CO2 mmol/L 23 24 25   ANION GAP mmol/L 8 8 8   BUN mg/dL 11 16 12   CREATININE mg/dL 0.72 0.84 0.83   CALCIUM mg/dL 8.7 9.1 9.4   ALBUMIN g/dL 3.3*  --   --    TOTAL BILIRUBIN mg/dL 0.27  --   --    ALK PHOS U/L 62  --   --    ALT U/L 12  --   --    AST U/L 19  --   --    EGFR ml/min/1.73sq m 91 85 86   GLUCOSE RANDOM mg/dL 149* 108 122     Results from last 7 days   Lab Units 02/19/25  0538 02/17/25  0555   MAGNESIUM mg/dL 1.7* 1.6*   PHOSPHORUS mg/dL 3.0  --                       Results from last 7 days   Lab Units 02/20/25  1129 02/20/25  0707 02/19/25  2108 02/19/25  1641 02/19/25  1130 02/19/25  0758 02/18/25  2056 02/18/25  1559 02/18/25  1119 02/18/25  0707 02/17/25  2143 02/17/25  1624   POC GLUCOSE mg/dl 240* 126 161* 131 162* 142* 139 147* 156* 142* 143* 168*               Recent Cultures (last 7 days):         Imaging:  Reviewed radiology reports from this admission including:  XR chest 2 views  Result Date: 2/11/2025  Impression: No acute cardiopulmonary disease. Resident: Liban Lauren I, the attending radiologist, have reviewed the images and agree with the final report above. Workstation performed: [id number]     CT abdomen pelvis with contrast  Result Date: 2/10/2025  Impression: Suprapubic catheter present. Chronic bladder wall thickening and perivesical stranding, mildly improved compared to January. Mild thickening about the distal rectum, also. Chronic. No new findings. Workstation performed: [id number]       Last 24 Hours Medication List:     Current Facility-Administered Medications:     acetaminophen (TYLENOL) tablet 650 mg, Q6H PRN     aluminum-magnesium hydroxide-simethicone (MAALOX) oral suspension 30 mL, Q4H PRN    amLODIPine (NORVASC) tablet 10 mg, Daily    atorvastatin (LIPITOR) tablet 80 mg, Daily With Dinner    baclofen tablet 5 mg, TID    budesonide-formoterol (SYMBICORT) 160-4.5 mcg/act inhaler 2 puff, BID    clopidogrel (PLAVIX) tablet 75 mg, Daily    cyanocobalamin (VITAMIN B-12) tablet 500 mcg, Daily    enoxaparin (LOVENOX) subcutaneous injection 40 mg, Daily    gabapentin (NEURONTIN) capsule 300 mg, BID    gabapentin (NEURONTIN) capsule 600 mg, HS    insulin lispro (HumALOG/ADMELOG) 100 units/mL subcutaneous injection 1-5 Units, TID AC **AND** Fingerstick Glucose (POCT), TID AC    insulin lispro (HumALOG/ADMELOG) 100 units/mL subcutaneous injection 1-5 Units, HS    latanoprost (XALATAN) 0.005 % ophthalmic solution 1 drop, HS    melatonin tablet 3 mg, HS PRN    mirtazapine (REMERON) tablet 7.5 mg, HS    nystatin (MYCOSTATIN) powder, BID    ondansetron (ZOFRAN) injection 4 mg, Q4H PRN    oxyCODONE (ROXICODONE) IR tablet 5 mg, Q6H PRN    pantoprazole (PROTONIX) EC tablet 40 mg, Early Morning    polyethylene glycol (MIRALAX) packet 17 g, Daily    propranolol (INDERAL LA) 24 hr capsule 60 mg, Daily    senna (SENOKOT) tablet 17.2 mg, HS    Administrative Statements   Today, Patient Was Seen By: Shon Mendez, DO  I have spent a total time of 35 minutes in caring for this patient on the day of the visit/encounter including Counseling / Coordination of care, Documenting in the medical record, and Communicating with other healthcare professionals .    **Please Note: This note may have been constructed using a voice recognition system.**

## 2025-02-20 NOTE — PLAN OF CARE
Problem: Potential for Falls  Goal: Patient will remain free of falls  Description: INTERVENTIONS:  - Educate patient/family on patient safety including physical limitations  - Instruct patient to call for assistance with activity   - Consult OT/PT to assist with strengthening/mobility   - Keep Call bell within reach  - Keep bed low and locked with side rails adjusted as appropriate  - Keep care items and personal belongings within reach  - Initiate and maintain comfort rounds  - Make Fall Risk Sign visible to staff  - Offer Toileting every 2 Hours, in advance of need  - Initiate/Maintain bed alarm  - Apply yellow socks and bracelet for high fall risk patients  - Consider moving patient to room near nurses station  Outcome: Progressing     Problem: PAIN - ADULT  Goal: Verbalizes/displays adequate comfort level or baseline comfort level  Description: Interventions:  - Encourage patient to monitor pain and request assistance  - Assess pain using appropriate pain scale  - Administer analgesics based on type and severity of pain and evaluate response  - Implement non-pharmacological measures as appropriate and evaluate response  - Consider cultural and social influences on pain and pain management  - Notify physician/advanced practitioner if interventions unsuccessful or patient reports new pain  Outcome: Progressing     Problem: INFECTION - ADULT  Goal: Absence or prevention of progression during hospitalization  Description: INTERVENTIONS:  - Assess and monitor for signs and symptoms of infection  - Monitor lab/diagnostic results  - Monitor all insertion sites, i.e. indwelling lines, tubes, and drains  - Monitor endotracheal if appropriate and nasal secretions for changes in amount and color  - [place] appropriate cooling/warming therapies per order  - Administer medications as ordered  - Instruct and encourage patient and family to use good hand hygiene technique  - Identify and instruct in appropriate isolation  precautions for identified infection/condition  Outcome: Progressing     Problem: DISCHARGE PLANNING  Goal: Discharge to home or other facility with appropriate resources  Description: INTERVENTIONS:  - Identify barriers to discharge w/patient and caregiver  - Arrange for needed discharge resources and transportation as appropriate  - Identify discharge learning needs (meds, wound care, etc.)  - Arrange for interpretive services to assist at discharge as needed  - Refer to Case Management Department for coordinating discharge planning if the patient needs post-hospital services based on physician/advanced practitioner order or complex needs related to functional status, cognitive ability, or social support system  Outcome: Progressing     Problem: Knowledge Deficit  Goal: Patient/family/caregiver demonstrates understanding of disease process, treatment plan, medications, and discharge instructions  Description: Complete learning assessment and assess knowledge base.  Interventions:  - Provide teaching at level of understanding  - Provide teaching via preferred learning methods  Outcome: Progressing     Problem: GASTROINTESTINAL - ADULT  Goal: Minimal or absence of nausea and/or vomiting  Description: INTERVENTIONS:  - Administer IV fluids if ordered to ensure adequate hydration  - Maintain NPO status until nausea and vomiting are resolved  - Nasogastric tube if ordered  - Administer ordered antiemetic medications as needed  - Provide nonpharmacologic comfort measures as appropriate  - Advance diet as tolerated, if ordered  - Consider nutrition services referral to assist patient with adequate nutrition and appropriate food choices  Outcome: Progressing  Goal: Maintains or returns to baseline bowel function  Description: INTERVENTIONS:  - Assess bowel function  - Encourage oral fluids to ensure adequate hydration  - Administer IV fluids if ordered to ensure adequate hydration  - Administer ordered medications as  needed  - Encourage mobilization and activity  - Consider nutritional services referral to assist patient with adequate nutrition and appropriate food choices  Outcome: Progressing  Goal: Maintains adequate nutritional intake  Description: INTERVENTIONS:  - Monitor percentage of each meal consumed  - Identify factors contributing to decreased intake, treat as appropriate  - Assist with meals as needed  - Monitor I&O, weight, and lab values if indicated  - Obtain nutrition services referral as needed  Outcome: Progressing     Problem: GENITOURINARY - ADULT  Goal: Maintains or returns to baseline urinary function  Description: INTERVENTIONS:  - Assess urinary function  - Encourage oral fluids to ensure adequate hydration if ordered  - Administer IV fluids as ordered to ensure adequate hydration  - Administer ordered medications as needed  - Offer frequent toileting  - Follow urinary retention protocol if ordered  Outcome: Progressing  Goal: Absence of urinary retention  Description: INTERVENTIONS:  - Assess patient's ability to void and empty bladder  - Monitor I/O  - Bladder scan as needed  - Discuss with physician/AP medications to alleviate retention as needed  - Discuss catheterization for long term situations as appropriate  Outcome: Progressing  Goal: Urinary catheter remains patent  Description: INTERVENTIONS:  - Assess patency of urinary catheter  - If patient has a chronic dela cruz, consider changing catheter if non-functioning  - Follow guidelines for intermittent irrigation of non-functioning urinary catheter  Outcome: Progressing     Problem: METABOLIC, FLUID AND ELECTROLYTES - ADULT  Goal: Electrolytes maintained within normal limits  Description: INTERVENTIONS:  - Monitor labs and assess patient for signs and symptoms of electrolyte imbalances  - Administer electrolyte replacement as ordered  - Monitor response to electrolyte replacements, including repeat lab results as appropriate  - Instruct patient on  fluid and nutrition as appropriate  Outcome: Progressing  Goal: Fluid balance maintained  Description: INTERVENTIONS:  - Monitor labs   - Monitor I/O and WT  - Instruct patient on fluid and nutrition as appropriate  - Assess for signs & symptoms of volume excess or deficit  Outcome: Progressing  Goal: Glucose maintained within target range  Description: INTERVENTIONS:  - Monitor Blood Glucose as ordered  - Assess for signs and symptoms of hyperglycemia and hypoglycemia  - Administer ordered medications to maintain glucose within target range  - Assess nutritional intake and initiate nutrition service referral as needed  Outcome: Progressing     Problem: Prexisting or High Potential for Compromised Skin Integrity  Goal: Skin integrity is maintained or improved  Description: INTERVENTIONS:  - Identify patients at risk for skin breakdown  - Assess and monitor skin integrity  - Assess and monitor nutrition and hydration status  - Monitor labs   - Assess for incontinence   - Turn and reposition patient  - Assist with mobility/ambulation  - Relieve pressure over bony prominences  - Avoid friction and shearing  - Provide appropriate hygiene as needed including keeping skin clean and dry  - Evaluate need for skin moisturizer/barrier cream  - Collaborate with interdisciplinary team   - Patient/family teaching  - Consider wound care consult   Outcome: Progressing     Problem: Nutrition/Hydration-ADULT  Goal: Nutrient/Hydration intake appropriate for improving, restoring or maintaining nutritional needs  Description: Monitor and assess patient's nutrition/hydration status for malnutrition. Collaborate with interdisciplinary team and initiate plan and interventions as ordered.  Monitor patient's weight and dietary intake as ordered or per policy. Utilize nutrition screening tool and intervene as necessary. Determine patient's food preferences and provide high-protein, high-caloric foods as appropriate.     INTERVENTIONS:  -  Monitor oral intake, urinary output, labs, and treatment plans  - Assess nutrition and hydration status and recommend course of action  - Evaluate amount of meals eaten  - Assist patient with eating if necessary   - Allow adequate time for meals  - Recommend/ encourage appropriate diets, oral nutritional supplements, and vitamin/mineral supplements  - Order, calculate, and assess calorie counts as needed  - Recommend, monitor, and adjust tube feedings and TPN/PPN based on assessed needs  - Assess need for intravenous fluids  - Provide specific nutrition/hydration education as appropriate  - Include patient/family/caregiver in decisions related to nutrition  Outcome: Progressing

## 2025-02-20 NOTE — ASSESSMENT & PLAN NOTE
History of MS hypertension neurogenic bladder with suprapubic tube who presented to the hospital with generalized abdominal pain  Sepsis present on admission secondary to urinary tract infection  UTI due to urinary catheter.  SPT was changed in the ED 2/10/2025  Urine culture: Enterococcus sensitive to ertapenem  Completed course.  Disposition: Plan for SNF placement

## 2025-02-20 NOTE — ASSESSMENT & PLAN NOTE
Neurogenic bladder status post suprapubic tube  Suprapubic tube was changed out in the ED 2/10/2025  Currently having significant abdominal pain.  Will irrigate urinary catheter and check CT imaging to rule out obstruction/stone

## 2025-02-20 NOTE — TELEPHONE ENCOUNTER
Patient scheduled for follow up 3/12 @ 8:30 after Dr Jeffries's visit - Will notify after discharge

## 2025-02-20 NOTE — CASE MANAGEMENT
Case Management Progress Note    Patient name Mathew Rico  Location [facility] 2 /South 2 M* MRN [id number]  : 1950 Date 2025       LOS (days): 10  Geometric Mean LOS (GMLOS) (days): 3.5  Days to GMLOS:-5.9        OBJECTIVE:        Current admission status: Inpatient  Preferred Pharmacy:   [facility]/pharmacy #[id number] - [place] - [address]  [address] [address]  Phone: [phone] Fax: [phone]    [place], [place] -  [address] [place] [address] [address] [facility] [address]  Phone: [phone] Fax: [phone]    [facility] [place] - [address]  [facility] [facility],  [address]  [facility],  First [address] [facility] [address]  Phone: [phone] Fax: [phone]     [facility] [place], [place] - [address]  [address] [place] [address]  Phone: [phone] Fax: [phone]    Primary Care Provider: Carolina Kumari MD    Primary Insurance: [facility]  Secondary Insurance:     PROGRESS NOTE: CM call to [facility] [phone] inquiring on status of determination for SNF.  D/C delayed due to requiring same prior to STR at [facility].

## 2025-02-20 NOTE — TELEPHONE ENCOUNTER
Mathew reached out to the office as he remains inpatient but was due for his routine 6-week SPT exchange today in office (2/20/2025).  Chart review reveals that patient's SPT was exchanged this admission on 2/10/2025 by emergency room staff.  No need for repeat SPT exchange at this time as it is draining appropriately.  Office is aware, and will assist with scheduling his routine 6-week exchanges--next exchange due approximately 3/24/2025.  Thank you!

## 2025-02-20 NOTE — ASSESSMENT & PLAN NOTE
Continue sliding scale during hospitalization    Results from last 7 days   Lab Units 02/20/25  1129 02/20/25  0707 02/19/25  2108 02/19/25  1641 02/19/25  1130   POC GLUCOSE mg/dl 240* 126 161* 131 162*

## 2025-02-20 NOTE — PLAN OF CARE
Problem: Potential for Falls  Goal: Patient will remain free of falls  Description: INTERVENTIONS:  - Educate patient/family on patient safety including physical limitations  - Instruct patient to call for assistance with activity   - Consult OT/PT to assist with strengthening/mobility   - Keep Call bell within reach  - Keep bed low and locked with side rails adjusted as appropriate  - Keep care items and personal belongings within reach  - Initiate and maintain comfort rounds  - Make Fall Risk Sign visible to staff  - Offer Toileting every 2 Hours, in advance of need  - Initiate/Maintain bed alarm  - Apply yellow socks and bracelet for high fall risk patients  - Consider moving patient to room near nurses station  Outcome: Progressing     Problem: PAIN - ADULT  Goal: Verbalizes/displays adequate comfort level or baseline comfort level  Description: Interventions:  - Encourage patient to monitor pain and request assistance  - Assess pain using appropriate pain scale  - Administer analgesics based on type and severity of pain and evaluate response  - Implement non-pharmacological measures as appropriate and evaluate response  - Consider cultural and social influences on pain and pain management  - Notify physician/advanced practitioner if interventions unsuccessful or patient reports new pain  Outcome: Progressing     Problem: INFECTION - ADULT  Goal: Absence or prevention of progression during hospitalization  Description: INTERVENTIONS:  - Assess and monitor for signs and symptoms of infection  - Monitor lab/diagnostic results  - Monitor all insertion sites, i.e. indwelling lines, tubes, and drains  - Monitor endotracheal if appropriate and nasal secretions for changes in amount and color  - [place] appropriate cooling/warming therapies per order  - Administer medications as ordered  - Instruct and encourage patient and family to use good hand hygiene technique  - Identify and instruct in appropriate isolation  precautions for identified infection/condition  Outcome: Progressing     Problem: DISCHARGE PLANNING  Goal: Discharge to home or other facility with appropriate resources  Description: INTERVENTIONS:  - Identify barriers to discharge w/patient and caregiver  - Arrange for needed discharge resources and transportation as appropriate  - Identify discharge learning needs (meds, wound care, etc.)  - Arrange for interpretive services to assist at discharge as needed  - Refer to Case Management Department for coordinating discharge planning if the patient needs post-hospital services based on physician/advanced practitioner order or complex needs related to functional status, cognitive ability, or social support system  Outcome: Progressing     Problem: Knowledge Deficit  Goal: Patient/family/caregiver demonstrates understanding of disease process, treatment plan, medications, and discharge instructions  Description: Complete learning assessment and assess knowledge base.  Interventions:  - Provide teaching at level of understanding  - Provide teaching via preferred learning methods  Outcome: Progressing     Problem: GASTROINTESTINAL - ADULT  Goal: Minimal or absence of nausea and/or vomiting  Description: INTERVENTIONS:  - Administer IV fluids if ordered to ensure adequate hydration  - Maintain NPO status until nausea and vomiting are resolved  - Nasogastric tube if ordered  - Administer ordered antiemetic medications as needed  - Provide nonpharmacologic comfort measures as appropriate  - Advance diet as tolerated, if ordered  - Consider nutrition services referral to assist patient with adequate nutrition and appropriate food choices  Outcome: Progressing  Goal: Maintains or returns to baseline bowel function  Description: INTERVENTIONS:  - Assess bowel function  - Encourage oral fluids to ensure adequate hydration  - Administer IV fluids if ordered to ensure adequate hydration  - Administer ordered medications as  needed  - Encourage mobilization and activity  - Consider nutritional services referral to assist patient with adequate nutrition and appropriate food choices  Outcome: Progressing  Goal: Maintains adequate nutritional intake  Description: INTERVENTIONS:  - Monitor percentage of each meal consumed  - Identify factors contributing to decreased intake, treat as appropriate  - Assist with meals as needed  - Monitor I&O, weight, and lab values if indicated  - Obtain nutrition services referral as needed  Outcome: Progressing     Problem: GENITOURINARY - ADULT  Goal: Maintains or returns to baseline urinary function  Description: INTERVENTIONS:  - Assess urinary function  - Encourage oral fluids to ensure adequate hydration if ordered  - Administer IV fluids as ordered to ensure adequate hydration  - Administer ordered medications as needed  - Offer frequent toileting  - Follow urinary retention protocol if ordered  Outcome: Progressing  Goal: Absence of urinary retention  Description: INTERVENTIONS:  - Assess patient's ability to void and empty bladder  - Monitor I/O  - Bladder scan as needed  - Discuss with physician/AP medications to alleviate retention as needed  - Discuss catheterization for long term situations as appropriate  Outcome: Progressing  Goal: Urinary catheter remains patent  Description: INTERVENTIONS:  - Assess patency of urinary catheter  - If patient has a chronic dela cruz, consider changing catheter if non-functioning  - Follow guidelines for intermittent irrigation of non-functioning urinary catheter  Outcome: Progressing     Problem: METABOLIC, FLUID AND ELECTROLYTES - ADULT  Goal: Electrolytes maintained within normal limits  Description: INTERVENTIONS:  - Monitor labs and assess patient for signs and symptoms of electrolyte imbalances  - Administer electrolyte replacement as ordered  - Monitor response to electrolyte replacements, including repeat lab results as appropriate  - Instruct patient on  fluid and nutrition as appropriate  Outcome: Progressing  Goal: Fluid balance maintained  Description: INTERVENTIONS:  - Monitor labs   - Monitor I/O and WT  - Instruct patient on fluid and nutrition as appropriate  - Assess for signs & symptoms of volume excess or deficit  Outcome: Progressing  Goal: Glucose maintained within target range  Description: INTERVENTIONS:  - Monitor Blood Glucose as ordered  - Assess for signs and symptoms of hyperglycemia and hypoglycemia  - Administer ordered medications to maintain glucose within target range  - Assess nutritional intake and initiate nutrition service referral as needed  Outcome: Progressing     Problem: Prexisting or High Potential for Compromised Skin Integrity  Goal: Skin integrity is maintained or improved  Description: INTERVENTIONS:  - Identify patients at risk for skin breakdown  - Assess and monitor skin integrity  - Assess and monitor nutrition and hydration status  - Monitor labs   - Assess for incontinence   - Turn and reposition patient  - Assist with mobility/ambulation  - Relieve pressure over bony prominences  - Avoid friction and shearing  - Provide appropriate hygiene as needed including keeping skin clean and dry  - Evaluate need for skin moisturizer/barrier cream  - Collaborate with interdisciplinary team   - Patient/family teaching  - Consider wound care consult   Outcome: Progressing     Problem: Nutrition/Hydration-ADULT  Goal: Nutrient/Hydration intake appropriate for improving, restoring or maintaining nutritional needs  Description: Monitor and assess patient's nutrition/hydration status for malnutrition. Collaborate with interdisciplinary team and initiate plan and interventions as ordered.  Monitor patient's weight and dietary intake as ordered or per policy. Utilize nutrition screening tool and intervene as necessary. Determine patient's food preferences and provide high-protein, high-caloric foods as appropriate.     INTERVENTIONS:  -  Monitor oral intake, urinary output, labs, and treatment plans  - Assess nutrition and hydration status and recommend course of action  - Evaluate amount of meals eaten  - Assist patient with eating if necessary   - Allow adequate time for meals  - Recommend/ encourage appropriate diets, oral nutritional supplements, and vitamin/mineral supplements  - Order, calculate, and assess calorie counts as needed  - Recommend, monitor, and adjust tube feedings and TPN/PPN based on assessed needs  - Assess need for intravenous fluids  - Provide specific nutrition/hydration education as appropriate  - Include patient/family/caregiver in decisions related to nutrition  Outcome: Progressing

## 2025-02-20 NOTE — PROGRESS NOTES
Email received from High Utilizer Committee on 2/19/25.  Committee reviewed and determined a care plan is not appropriate at this time.  Per review, it appears that utilization is related to his SPT.  Recommendation from committee is to ensure that tube is flushed once daily and that patient has sufficient supplied for increased flushes.    Patient is referred to [facility] as a Rising Risk Utilizer.  OP[facility] Watch List database updated.

## 2025-02-21 ENCOUNTER — PATIENT OUTREACH (OUTPATIENT)
Dept: CASE MANAGEMENT | Facility: OTHER | Age: 75
End: 2025-02-21

## 2025-02-21 LAB
GLUCOSE SERPL-MCNC: 153 MG/DL (ref 65–140)
GLUCOSE SERPL-MCNC: 156 MG/DL (ref 65–140)
GLUCOSE SERPL-MCNC: 168 MG/DL (ref 65–140)
GLUCOSE SERPL-MCNC: 220 MG/DL (ref 65–140)

## 2025-02-21 PROCEDURE — RECHECK: Performed by: INTERNAL MEDICINE

## 2025-02-21 PROCEDURE — 82948 REAGENT STRIP/BLOOD GLUCOSE: CPT

## 2025-02-21 PROCEDURE — 99232 SBSQ HOSP IP/OBS MODERATE 35: CPT | Performed by: INTERNAL MEDICINE

## 2025-02-21 RX ORDER — HYDROMORPHONE HCL/PF 1 MG/ML
0.5 SYRINGE (ML) INJECTION ONCE
Status: DISCONTINUED | OUTPATIENT
Start: 2025-02-21 | End: 2025-02-21

## 2025-02-21 RX ORDER — MAGNESIUM HYDROXIDE/ALUMINUM HYDROXICE/SIMETHICONE 120; 1200; 1200 MG/30ML; MG/30ML; MG/30ML
30 SUSPENSION ORAL EVERY 4 HOURS PRN
Status: CANCELLED | OUTPATIENT
Start: 2025-02-21

## 2025-02-21 RX ADMIN — LATANOPROST 1 DROP: 50 SOLUTION OPHTHALMIC at 22:01

## 2025-02-21 RX ADMIN — CLOPIDOGREL BISULFATE 75 MG: 75 TABLET ORAL at 08:46

## 2025-02-21 RX ADMIN — BUDESONIDE AND FORMOTEROL FUMARATE DIHYDRATE 2 PUFF: 160; 4.5 AEROSOL RESPIRATORY (INHALATION) at 08:46

## 2025-02-21 RX ADMIN — INSULIN LISPRO 1 UNITS: 100 INJECTION, SOLUTION INTRAVENOUS; SUBCUTANEOUS at 08:46

## 2025-02-21 RX ADMIN — SENNOSIDES 17.2 MG: 8.6 TABLET ORAL at 21:59

## 2025-02-21 RX ADMIN — GABAPENTIN 300 MG: 300 CAPSULE ORAL at 08:46

## 2025-02-21 RX ADMIN — POLYETHYLENE GLYCOL 3350 17 G: 17 POWDER, FOR SOLUTION ORAL at 08:46

## 2025-02-21 RX ADMIN — INSULIN LISPRO 1 UNITS: 100 INJECTION, SOLUTION INTRAVENOUS; SUBCUTANEOUS at 17:00

## 2025-02-21 RX ADMIN — BACLOFEN 5 MG: 10 TABLET ORAL at 20:44

## 2025-02-21 RX ADMIN — GABAPENTIN 600 MG: 300 CAPSULE ORAL at 21:59

## 2025-02-21 RX ADMIN — PANTOPRAZOLE SODIUM 40 MG: 40 TABLET, DELAYED RELEASE ORAL at 05:25

## 2025-02-21 RX ADMIN — BUDESONIDE AND FORMOTEROL FUMARATE DIHYDRATE 2 PUFF: 160; 4.5 AEROSOL RESPIRATORY (INHALATION) at 17:01

## 2025-02-21 RX ADMIN — BACLOFEN 5 MG: 10 TABLET ORAL at 17:00

## 2025-02-21 RX ADMIN — BACLOFEN 5 MG: 10 TABLET ORAL at 08:47

## 2025-02-21 RX ADMIN — ATORVASTATIN CALCIUM 80 MG: 80 TABLET, FILM COATED ORAL at 17:00

## 2025-02-21 RX ADMIN — MIRTAZAPINE 7.5 MG: 7.5 TABLET, FILM COATED ORAL at 21:59

## 2025-02-21 RX ADMIN — INSULIN LISPRO 1 UNITS: 100 INJECTION, SOLUTION INTRAVENOUS; SUBCUTANEOUS at 21:59

## 2025-02-21 RX ADMIN — AMLODIPINE BESYLATE 10 MG: 10 TABLET ORAL at 08:46

## 2025-02-21 RX ADMIN — NYSTATIN: 100000 POWDER TOPICAL at 08:46

## 2025-02-21 RX ADMIN — GABAPENTIN 300 MG: 300 CAPSULE ORAL at 17:00

## 2025-02-21 RX ADMIN — OXYCODONE 5 MG: 5 TABLET ORAL at 08:56

## 2025-02-21 RX ADMIN — PROPRANOLOL HYDROCHLORIDE 60 MG: 60 CAPSULE, EXTENDED RELEASE ORAL at 08:46

## 2025-02-21 RX ADMIN — ENOXAPARIN SODIUM 40 MG: 40 INJECTION SUBCUTANEOUS at 08:46

## 2025-02-21 RX ADMIN — ALUMINUM HYDROXIDE, MAGNESIUM HYDROXIDE, AND SIMETHICONE 30 ML: 200; 200; 20 SUSPENSION ORAL at 09:15

## 2025-02-21 RX ADMIN — Medication 500 MCG: at 08:46

## 2025-02-21 RX ADMIN — NYSTATIN: 100000 POWDER TOPICAL at 17:01

## 2025-02-21 RX ADMIN — INSULIN LISPRO 2 UNITS: 100 INJECTION, SOLUTION INTRAVENOUS; SUBCUTANEOUS at 12:15

## 2025-02-21 NOTE — PROGRESS NOTES
Treatment plan    Urinary catheter flushed by nursing 60 mL x 2.  Prompt return of urine via SPT currently 1200+ mL.  Immediate relief in discomfort.  Will defer on CT imaging.    Shon Mendez,  FACP

## 2025-02-21 NOTE — ASSESSMENT & PLAN NOTE
Continue sliding scale during hospitalization    Results from last 7 days   Lab Units 02/21/25  0622 02/20/25  2046 02/20/25  1637 02/20/25  1129 02/20/25  0707   POC GLUCOSE mg/dl 153* 196* 162* 240* 126

## 2025-02-21 NOTE — PLAN OF CARE
Problem: Potential for Falls  Goal: Patient will remain free of falls  Description: INTERVENTIONS:  - Educate patient/family on patient safety including physical limitations  - Instruct patient to call for assistance with activity   - Consult OT/PT to assist with strengthening/mobility   - Keep Call bell within reach  - Keep bed low and locked with side rails adjusted as appropriate  - Keep care items and personal belongings within reach  - Initiate and maintain comfort rounds  - Make Fall Risk Sign visible to staff  - Offer Toileting every 2 Hours, in advance of need  - Initiate/Maintain bed alarm  - Apply yellow socks and bracelet for high fall risk patients  - Consider moving patient to room near nurses station  Outcome: Progressing     Problem: PAIN - ADULT  Goal: Verbalizes/displays adequate comfort level or baseline comfort level  Description: Interventions:  - Encourage patient to monitor pain and request assistance  - Assess pain using appropriate pain scale  - Administer analgesics based on type and severity of pain and evaluate response  - Implement non-pharmacological measures as appropriate and evaluate response  - Consider cultural and social influences on pain and pain management  - Notify physician/advanced practitioner if interventions unsuccessful or patient reports new pain  Outcome: Progressing     Problem: INFECTION - ADULT  Goal: Absence or prevention of progression during hospitalization  Description: INTERVENTIONS:  - Assess and monitor for signs and symptoms of infection  - Monitor lab/diagnostic results  - Monitor all insertion sites, i.e. indwelling lines, tubes, and drains  - Monitor endotracheal if appropriate and nasal secretions for changes in amount and color  - [place] appropriate cooling/warming therapies per order  - Administer medications as ordered  - Instruct and encourage patient and family to use good hand hygiene technique  - Identify and instruct in appropriate isolation  precautions for identified infection/condition  Outcome: Progressing     Problem: DISCHARGE PLANNING  Goal: Discharge to home or other facility with appropriate resources  Description: INTERVENTIONS:  - Identify barriers to discharge w/patient and caregiver  - Arrange for needed discharge resources and transportation as appropriate  - Identify discharge learning needs (meds, wound care, etc.)  - Arrange for interpretive services to assist at discharge as needed  - Refer to Case Management Department for coordinating discharge planning if the patient needs post-hospital services based on physician/advanced practitioner order or complex needs related to functional status, cognitive ability, or social support system  Outcome: Progressing     Problem: Knowledge Deficit  Goal: Patient/family/caregiver demonstrates understanding of disease process, treatment plan, medications, and discharge instructions  Description: Complete learning assessment and assess knowledge base.  Interventions:  - Provide teaching at level of understanding  - Provide teaching via preferred learning methods  Outcome: Progressing     Problem: GASTROINTESTINAL - ADULT  Goal: Minimal or absence of nausea and/or vomiting  Description: INTERVENTIONS:  - Administer IV fluids if ordered to ensure adequate hydration  - Maintain NPO status until nausea and vomiting are resolved  - Nasogastric tube if ordered  - Administer ordered antiemetic medications as needed  - Provide nonpharmacologic comfort measures as appropriate  - Advance diet as tolerated, if ordered  - Consider nutrition services referral to assist patient with adequate nutrition and appropriate food choices  Outcome: Progressing  Goal: Maintains or returns to baseline bowel function  Description: INTERVENTIONS:  - Assess bowel function  - Encourage oral fluids to ensure adequate hydration  - Administer IV fluids if ordered to ensure adequate hydration  - Administer ordered medications as  needed  - Encourage mobilization and activity  - Consider nutritional services referral to assist patient with adequate nutrition and appropriate food choices  Outcome: Progressing  Goal: Maintains adequate nutritional intake  Description: INTERVENTIONS:  - Monitor percentage of each meal consumed  - Identify factors contributing to decreased intake, treat as appropriate  - Assist with meals as needed  - Monitor I&O, weight, and lab values if indicated  - Obtain nutrition services referral as needed  Outcome: Progressing     Problem: GENITOURINARY - ADULT  Goal: Maintains or returns to baseline urinary function  Description: INTERVENTIONS:  - Assess urinary function  - Encourage oral fluids to ensure adequate hydration if ordered  - Administer IV fluids as ordered to ensure adequate hydration  - Administer ordered medications as needed  - Offer frequent toileting  - Follow urinary retention protocol if ordered  Outcome: Progressing  Goal: Absence of urinary retention  Description: INTERVENTIONS:  - Assess patient's ability to void and empty bladder  - Monitor I/O  - Bladder scan as needed  - Discuss with physician/AP medications to alleviate retention as needed  - Discuss catheterization for long term situations as appropriate  Outcome: Progressing  Goal: Urinary catheter remains patent  Description: INTERVENTIONS:  - Assess patency of urinary catheter  - If patient has a chronic dela cruz, consider changing catheter if non-functioning  - Follow guidelines for intermittent irrigation of non-functioning urinary catheter  Outcome: Progressing     Problem: METABOLIC, FLUID AND ELECTROLYTES - ADULT  Goal: Electrolytes maintained within normal limits  Description: INTERVENTIONS:  - Monitor labs and assess patient for signs and symptoms of electrolyte imbalances  - Administer electrolyte replacement as ordered  - Monitor response to electrolyte replacements, including repeat lab results as appropriate  - Instruct patient on  fluid and nutrition as appropriate  Outcome: Progressing  Goal: Fluid balance maintained  Description: INTERVENTIONS:  - Monitor labs   - Monitor I/O and WT  - Instruct patient on fluid and nutrition as appropriate  - Assess for signs & symptoms of volume excess or deficit  Outcome: Progressing  Goal: Glucose maintained within target range  Description: INTERVENTIONS:  - Monitor Blood Glucose as ordered  - Assess for signs and symptoms of hyperglycemia and hypoglycemia  - Administer ordered medications to maintain glucose within target range  - Assess nutritional intake and initiate nutrition service referral as needed  Outcome: Progressing     Problem: Prexisting or High Potential for Compromised Skin Integrity  Goal: Skin integrity is maintained or improved  Description: INTERVENTIONS:  - Identify patients at risk for skin breakdown  - Assess and monitor skin integrity  - Assess and monitor nutrition and hydration status  - Monitor labs   - Assess for incontinence   - Turn and reposition patient  - Assist with mobility/ambulation  - Relieve pressure over bony prominences  - Avoid friction and shearing  - Provide appropriate hygiene as needed including keeping skin clean and dry  - Evaluate need for skin moisturizer/barrier cream  - Collaborate with interdisciplinary team   - Patient/family teaching  - Consider wound care consult   Outcome: Progressing     Problem: Nutrition/Hydration-ADULT  Goal: Nutrient/Hydration intake appropriate for improving, restoring or maintaining nutritional needs  Description: Monitor and assess patient's nutrition/hydration status for malnutrition. Collaborate with interdisciplinary team and initiate plan and interventions as ordered.  Monitor patient's weight and dietary intake as ordered or per policy. Utilize nutrition screening tool and intervene as necessary. Determine patient's food preferences and provide high-protein, high-caloric foods as appropriate.     INTERVENTIONS:  -  Monitor oral intake, urinary output, labs, and treatment plans  - Assess nutrition and hydration status and recommend course of action  - Evaluate amount of meals eaten  - Assist patient with eating if necessary   - Allow adequate time for meals  - Recommend/ encourage appropriate diets, oral nutritional supplements, and vitamin/mineral supplements  - Order, calculate, and assess calorie counts as needed  - Recommend, monitor, and adjust tube feedings and TPN/PPN based on assessed needs  - Assess need for intravenous fluids  - Provide specific nutrition/hydration education as appropriate  - Include patient/family/caregiver in decisions related to nutrition  Outcome: Progressing

## 2025-02-21 NOTE — PROGRESS NOTES
Progress Note - Hospitalist   Name: Mathew Rico 74 y.o. male I MRN: [id number]  Unit/Bed#: [place] [address] -01 I Date of Admission: 2/10/2025   Date of Service: 2/21/2025 I Hospital Day: 11    Assessment & Plan  Severe sepsis (HCC)  History of MS hypertension neurogenic bladder with suprapubic tube who presented to the hospital with generalized abdominal pain  Sepsis present on admission secondary to urinary tract infection  UTI due to urinary catheter.  SPT was changed in the ED 2/10/2025  Urine culture: Enterococcus sensitive to ertapenem  Completed course.  Disposition: Plan for SNF placement  Urinary retention  Neurogenic bladder status post suprapubic tube  Suprapubic tube was changed out in the ED 2/10/2025  Currently having significant abdominal pain.  Will irrigate urinary catheter and check CT imaging to rule out obstruction/stone  History of CVA (cerebrovascular accident)  History of stroke continue clopidogrel and atorvastatin  Primary hypertension  Continue amlodipine  Also on propranolol for history of essential tremors  Type 2 diabetes mellitus without complication, without long-term current use of insulin (HCC)  Continue sliding scale during hospitalization    Results from last 7 days   Lab Units 02/21/25  0622 02/20/25  2046 02/20/25  1637 02/20/25  1129 02/20/25  0707   POC GLUCOSE mg/dl 153* 196* 162* 240* 126     Multiple sclerosis (HCC)  MS with neurogenic bladder status post suprapubic tube  Continue baclofen and gabapentin    VTE Pharmacologic Prophylaxis: VTE Score: 3 Moderate Risk (Score 3-4) - Pharmacological DVT Prophylaxis Ordered: enoxaparin (Lovenox).    Mobility:   Basic Mobility Inpatient Raw Score: 8  -HLM Goal: 3: Sit at edge of bed  JH-HLM Achieved: 3: Sit at edge of bed  JH-HLM Goal achieved. Continue to encourage appropriate mobility.    Patient Centered Rounds: I have performed bedside rounds with nursing staff today.  Discussions with Specialists or Other Care Team  Provider: Case management    Education and Discussions with Family / Patient:     Current Length of Stay: 11 day(s)  Current Patient Status: Inpatient   Certification Statement:  Awaiting placement, workup of abdominal pain this morning  Discharge Plan: TBD    Code Status: Level 1 - Full Code    Subjective   Patient seen and examined.  Having a lot of abdominal pain this morning after breakfast.     Objective   Vitals:   Temp (24hrs), Av.3 °F (36.8 °C), Min:97.8 °F (36.6 °C), Max:98.7 °F (37.1 °C)    Temp:  [97.8 °F (36.6 °C)-98.7 °F (37.1 °C)] 97.8 °F (36.6 °C)  HR:  [71-75] 71  Resp:  [18] 18  BP: (140-143)/(59-61) 140/61  SpO2:  [92 %-96 %] 96 %  Body mass index is 24.26 kg/m².     Input and Output Summary (last 24 hours):     Intake/Output Summary (Last 24 hours) at 2025 0933  Last data filed at 2025 0913  Gross per 24 hour   Intake 1130 ml   Output 300 ml   Net 830 ml       Physical Exam  Vitals reviewed.   Constitutional:       General: He is not in acute distress.  HENT:      Head: Atraumatic.   Cardiovascular:      Rate and Rhythm: Regular rhythm.   Pulmonary:      Effort: Pulmonary effort is normal.      Breath sounds: Decreased breath sounds present. No wheezing.   Abdominal:      General: Bowel sounds are normal.      Palpations: Abdomen is soft.      Tenderness: There is abdominal tenderness. There is guarding.   Musculoskeletal:         General: No swelling.   Skin:     General: Skin is warm and dry.   Neurological:      Mental Status: He is alert. Mental status is at baseline.      Motor: Weakness present.   Psychiatric:         Mood and Affect: Mood normal.       Lines/Drains:  Invasive Devices       Peripheral Intravenous Line  Duration             Peripheral IV 25 Dorsal (posterior);Left Hand 1 day              Drain  Duration             Suprapubic Catheter 20 Fr. 10 days                            Lab Results: I have reviewed the following results:   Results from last 7 days    Lab Units 02/19/25  0538 02/17/25  0555   WBC Thousand/uL 8.90 6.91   HEMOGLOBIN g/dL 11.8* 12.5   PLATELETS Thousands/uL 285 300   MCV fL 86 88     Results from last 7 days   Lab Units 02/19/25  0538 02/17/25  0555   SODIUM mmol/L 138 137   POTASSIUM mmol/L 3.8 3.8   CHLORIDE mmol/L 107 105   CO2 mmol/L 23 24   ANION GAP mmol/L 8 8   BUN mg/dL 11 16   CREATININE mg/dL 0.72 0.84   CALCIUM mg/dL 8.7 9.1   ALBUMIN g/dL 3.3*  --    TOTAL BILIRUBIN mg/dL 0.27  --    ALK PHOS U/L 62  --    ALT U/L 12  --    AST U/L 19  --    EGFR ml/min/1.73sq m 91 85   GLUCOSE RANDOM mg/dL 149* 108     Results from last 7 days   Lab Units 02/19/25  0538 02/17/25  0555   MAGNESIUM mg/dL 1.7* 1.6*   PHOSPHORUS mg/dL 3.0  --                       Results from last 7 days   Lab Units 02/21/25  0622 02/20/25  2046 02/20/25  1637 02/20/25  1129 02/20/25  0707 02/19/25  2108 02/19/25  1641 02/19/25  1130 02/19/25  0758 02/18/25  2056 02/18/25  1559 02/18/25  1119   POC GLUCOSE mg/dl 153* 196* 162* 240* 126 161* 131 162* 142* 139 147* 156*               Recent Cultures (last 7 days):         Imaging:  Reviewed radiology reports from this admission including:  XR chest 2 views  Result Date: 2/11/2025  Impression: No acute cardiopulmonary disease. Resident: Liban Lauren I, the attending radiologist, have reviewed the images and agree with the final report above. Workstation performed: [id number]     CT abdomen pelvis with contrast  Result Date: 2/10/2025  Impression: Suprapubic catheter present. Chronic bladder wall thickening and perivesical stranding, mildly improved compared to January. Mild thickening about the distal rectum, also. Chronic. No new findings. Workstation performed: [id number]       Last 24 Hours Medication List:     Current Facility-Administered Medications:     acetaminophen (TYLENOL) tablet 650 mg, Q6H PRN    aluminum-magnesium hydroxide-simethicone (MAALOX) oral suspension 30 mL, Q4H PRN    amLODIPine (NORVASC) tablet  10 mg, Daily    atorvastatin (LIPITOR) tablet 80 mg, Daily With Dinner    baclofen tablet 5 mg, TID    budesonide-formoterol (SYMBICORT) 160-4.5 mcg/act inhaler 2 puff, BID    clopidogrel (PLAVIX) tablet 75 mg, Daily    cyanocobalamin (VITAMIN B-12) tablet 500 mcg, Daily    enoxaparin (LOVENOX) subcutaneous injection 40 mg, Daily    gabapentin (NEURONTIN) capsule 300 mg, BID    gabapentin (NEURONTIN) capsule 600 mg, HS    HYDROmorphone (DILAUDID) injection 0.5 mg, Once    insulin lispro (HumALOG/ADMELOG) 100 units/mL subcutaneous injection 1-5 Units, TID AC **AND** Fingerstick Glucose (POCT), TID AC    insulin lispro (HumALOG/ADMELOG) 100 units/mL subcutaneous injection 1-5 Units, HS    latanoprost (XALATAN) 0.005 % ophthalmic solution 1 drop, HS    melatonin tablet 3 mg, HS PRN    mirtazapine (REMERON) tablet 7.5 mg, HS    nystatin (MYCOSTATIN) powder, BID    ondansetron (ZOFRAN) injection 4 mg, Q4H PRN    oxyCODONE (ROXICODONE) IR tablet 5 mg, Q6H PRN    pantoprazole (PROTONIX) EC tablet 40 mg, Early Morning    polyethylene glycol (MIRALAX) packet 17 g, Daily    propranolol (INDERAL LA) 24 hr capsule 60 mg, Daily    senna (SENOKOT) tablet 17.2 mg, HS    Administrative Statements   Today, Patient Was Seen By: Shon Mendez, DO  I have spent a total time of 35 minutes in caring for this patient on the day of the visit/encounter including Documenting in the medical record, Reviewing/placing orders in the medical record (including tests, medications, and/or procedures), and Communicating with other healthcare professionals .    **Please Note: This note may have been constructed using a voice recognition system.**

## 2025-02-21 NOTE — PLAN OF CARE
Problem: Potential for Falls  Goal: Patient will remain free of falls  Description: INTERVENTIONS:  - Educate patient/family on patient safety including physical limitations  - Instruct patient to call for assistance with activity   - Consult OT/PT to assist with strengthening/mobility   - Keep Call bell within reach  - Keep bed low and locked with side rails adjusted as appropriate  - Keep care items and personal belongings within reach  - Initiate and maintain comfort rounds  - Make Fall Risk Sign visible to staff  - Offer Toileting every 2 Hours, in advance of need  - Initiate/Maintain bed alarm  - Apply yellow socks and bracelet for high fall risk patients  - Consider moving patient to room near nurses station  Outcome: Progressing     Problem: PAIN - ADULT  Goal: Verbalizes/displays adequate comfort level or baseline comfort level  Description: Interventions:  - Encourage patient to monitor pain and request assistance  - Assess pain using appropriate pain scale  - Administer analgesics based on type and severity of pain and evaluate response  - Implement non-pharmacological measures as appropriate and evaluate response  - Consider cultural and social influences on pain and pain management  - Notify physician/advanced practitioner if interventions unsuccessful or patient reports new pain  Outcome: Progressing     Problem: INFECTION - ADULT  Goal: Absence or prevention of progression during hospitalization  Description: INTERVENTIONS:  - Assess and monitor for signs and symptoms of infection  - Monitor lab/diagnostic results  - Monitor all insertion sites, i.e. indwelling lines, tubes, and drains  - Monitor endotracheal if appropriate and nasal secretions for changes in amount and color  - [place] appropriate cooling/warming therapies per order  - Administer medications as ordered  - Instruct and encourage patient and family to use good hand hygiene technique  - Identify and instruct in appropriate isolation  precautions for identified infection/condition  Outcome: Progressing     Problem: DISCHARGE PLANNING  Goal: Discharge to home or other facility with appropriate resources  Description: INTERVENTIONS:  - Identify barriers to discharge w/patient and caregiver  - Arrange for needed discharge resources and transportation as appropriate  - Identify discharge learning needs (meds, wound care, etc.)  - Arrange for interpretive services to assist at discharge as needed  - Refer to Case Management Department for coordinating discharge planning if the patient needs post-hospital services based on physician/advanced practitioner order or complex needs related to functional status, cognitive ability, or social support system  Outcome: Progressing     Problem: Knowledge Deficit  Goal: Patient/family/caregiver demonstrates understanding of disease process, treatment plan, medications, and discharge instructions  Description: Complete learning assessment and assess knowledge base.  Interventions:  - Provide teaching at level of understanding  - Provide teaching via preferred learning methods  Outcome: Progressing     Problem: GASTROINTESTINAL - ADULT  Goal: Minimal or absence of nausea and/or vomiting  Description: INTERVENTIONS:  - Administer IV fluids if ordered to ensure adequate hydration  - Maintain NPO status until nausea and vomiting are resolved  - Nasogastric tube if ordered  - Administer ordered antiemetic medications as needed  - Provide nonpharmacologic comfort measures as appropriate  - Advance diet as tolerated, if ordered  - Consider nutrition services referral to assist patient with adequate nutrition and appropriate food choices  Outcome: Progressing  Goal: Maintains or returns to baseline bowel function  Description: INTERVENTIONS:  - Assess bowel function  - Encourage oral fluids to ensure adequate hydration  - Administer IV fluids if ordered to ensure adequate hydration  - Administer ordered medications as  needed  - Encourage mobilization and activity  - Consider nutritional services referral to assist patient with adequate nutrition and appropriate food choices  Outcome: Progressing  Goal: Maintains adequate nutritional intake  Description: INTERVENTIONS:  - Monitor percentage of each meal consumed  - Identify factors contributing to decreased intake, treat as appropriate  - Assist with meals as needed  - Monitor I&O, weight, and lab values if indicated  - Obtain nutrition services referral as needed  Outcome: Progressing     Problem: GENITOURINARY - ADULT  Goal: Maintains or returns to baseline urinary function  Description: INTERVENTIONS:  - Assess urinary function  - Encourage oral fluids to ensure adequate hydration if ordered  - Administer IV fluids as ordered to ensure adequate hydration  - Administer ordered medications as needed  - Offer frequent toileting  - Follow urinary retention protocol if ordered  Outcome: Progressing  Goal: Absence of urinary retention  Description: INTERVENTIONS:  - Assess patient's ability to void and empty bladder  - Monitor I/O  - Bladder scan as needed  - Discuss with physician/AP medications to alleviate retention as needed  - Discuss catheterization for long term situations as appropriate  Outcome: Progressing  Goal: Urinary catheter remains patent  Description: INTERVENTIONS:  - Assess patency of urinary catheter  - If patient has a chronic dela cruz, consider changing catheter if non-functioning  - Follow guidelines for intermittent irrigation of non-functioning urinary catheter  Outcome: Progressing     Problem: METABOLIC, FLUID AND ELECTROLYTES - ADULT  Goal: Electrolytes maintained within normal limits  Description: INTERVENTIONS:  - Monitor labs and assess patient for signs and symptoms of electrolyte imbalances  - Administer electrolyte replacement as ordered  - Monitor response to electrolyte replacements, including repeat lab results as appropriate  - Instruct patient on  fluid and nutrition as appropriate  Outcome: Progressing  Goal: Fluid balance maintained  Description: INTERVENTIONS:  - Monitor labs   - Monitor I/O and WT  - Instruct patient on fluid and nutrition as appropriate  - Assess for signs & symptoms of volume excess or deficit  Outcome: Progressing  Goal: Glucose maintained within target range  Description: INTERVENTIONS:  - Monitor Blood Glucose as ordered  - Assess for signs and symptoms of hyperglycemia and hypoglycemia  - Administer ordered medications to maintain glucose within target range  - Assess nutritional intake and initiate nutrition service referral as needed  Outcome: Progressing     Problem: Prexisting or High Potential for Compromised Skin Integrity  Goal: Skin integrity is maintained or improved  Description: INTERVENTIONS:  - Identify patients at risk for skin breakdown  - Assess and monitor skin integrity  - Assess and monitor nutrition and hydration status  - Monitor labs   - Assess for incontinence   - Turn and reposition patient  - Assist with mobility/ambulation  - Relieve pressure over bony prominences  - Avoid friction and shearing  - Provide appropriate hygiene as needed including keeping skin clean and dry  - Evaluate need for skin moisturizer/barrier cream  - Collaborate with interdisciplinary team   - Patient/family teaching  - Consider wound care consult   Outcome: Progressing     Problem: Nutrition/Hydration-ADULT  Goal: Nutrient/Hydration intake appropriate for improving, restoring or maintaining nutritional needs  Description: Monitor and assess patient's nutrition/hydration status for malnutrition. Collaborate with interdisciplinary team and initiate plan and interventions as ordered.  Monitor patient's weight and dietary intake as ordered or per policy. Utilize nutrition screening tool and intervene as necessary. Determine patient's food preferences and provide high-protein, high-caloric foods as appropriate.     INTERVENTIONS:  -  Monitor oral intake, urinary output, labs, and treatment plans  - Assess nutrition and hydration status and recommend course of action  - Evaluate amount of meals eaten  - Assist patient with eating if necessary   - Allow adequate time for meals  - Recommend/ encourage appropriate diets, oral nutritional supplements, and vitamin/mineral supplements  - Order, calculate, and assess calorie counts as needed  - Recommend, monitor, and adjust tube feedings and TPN/PPN based on assessed needs  - Assess need for intravenous fluids  - Provide specific nutrition/hydration education as appropriate  - Include patient/family/caregiver in decisions related to nutrition  Outcome: Progressing

## 2025-02-22 LAB
ALBUMIN SERPL BCG-MCNC: 3.1 G/DL (ref 3.5–5)
ALP SERPL-CCNC: 64 U/L (ref 34–104)
ALT SERPL W P-5'-P-CCNC: 14 U/L (ref 7–52)
ANION GAP SERPL CALCULATED.3IONS-SCNC: 4 MMOL/L (ref 4–13)
AST SERPL W P-5'-P-CCNC: 12 U/L (ref 13–39)
BILIRUB SERPL-MCNC: 0.39 MG/DL (ref 0.2–1)
BUN SERPL-MCNC: 10 MG/DL (ref 5–25)
CALCIUM ALBUM COR SERPL-MCNC: 9.8 MG/DL (ref 8.3–10.1)
CALCIUM SERPL-MCNC: 9.1 MG/DL (ref 8.4–10.2)
CHLORIDE SERPL-SCNC: 106 MMOL/L (ref 96–108)
CO2 SERPL-SCNC: 28 MMOL/L (ref 21–32)
CREAT SERPL-MCNC: 0.83 MG/DL (ref 0.6–1.3)
ERYTHROCYTE [DISTWIDTH] IN BLOOD BY AUTOMATED COUNT: 15.6 % (ref 11.6–15.1)
GFR SERPL CREATININE-BSD FRML MDRD: 86 ML/MIN/1.73SQ M
GLUCOSE SERPL-MCNC: 133 MG/DL (ref 65–140)
GLUCOSE SERPL-MCNC: 148 MG/DL (ref 65–140)
GLUCOSE SERPL-MCNC: 154 MG/DL (ref 65–140)
GLUCOSE SERPL-MCNC: 181 MG/DL (ref 65–140)
GLUCOSE SERPL-MCNC: 228 MG/DL (ref 65–140)
HCT VFR BLD AUTO: 37 % (ref 36.5–49.3)
HGB BLD-MCNC: 11.7 G/DL (ref 12–17)
MAGNESIUM SERPL-MCNC: 1.7 MG/DL (ref 1.9–2.7)
MCH RBC QN AUTO: 27 PG (ref 26.8–34.3)
MCHC RBC AUTO-ENTMCNC: 31.6 G/DL (ref 31.4–37.4)
MCV RBC AUTO: 86 FL (ref 82–98)
PHOSPHATE SERPL-MCNC: 3.3 MG/DL (ref 2.3–4.1)
PLATELET # BLD AUTO: 313 THOUSANDS/UL (ref 149–390)
PMV BLD AUTO: 8.3 FL (ref 8.9–12.7)
POTASSIUM SERPL-SCNC: 4 MMOL/L (ref 3.5–5.3)
PROT SERPL-MCNC: 6.1 G/DL (ref 6.4–8.4)
RBC # BLD AUTO: 4.33 MILLION/UL (ref 3.88–5.62)
SODIUM SERPL-SCNC: 138 MMOL/L (ref 135–147)
WBC # BLD AUTO: 10.11 THOUSAND/UL (ref 4.31–10.16)

## 2025-02-22 PROCEDURE — 84100 ASSAY OF PHOSPHORUS: CPT | Performed by: INTERNAL MEDICINE

## 2025-02-22 PROCEDURE — 80053 COMPREHEN METABOLIC PANEL: CPT | Performed by: INTERNAL MEDICINE

## 2025-02-22 PROCEDURE — 99232 SBSQ HOSP IP/OBS MODERATE 35: CPT | Performed by: INTERNAL MEDICINE

## 2025-02-22 PROCEDURE — 85027 COMPLETE CBC AUTOMATED: CPT | Performed by: INTERNAL MEDICINE

## 2025-02-22 PROCEDURE — 82948 REAGENT STRIP/BLOOD GLUCOSE: CPT

## 2025-02-22 PROCEDURE — 83735 ASSAY OF MAGNESIUM: CPT | Performed by: INTERNAL MEDICINE

## 2025-02-22 RX ORDER — MAGNESIUM SULFATE HEPTAHYDRATE 40 MG/ML
2 INJECTION, SOLUTION INTRAVENOUS ONCE
Status: COMPLETED | OUTPATIENT
Start: 2025-02-22 | End: 2025-02-22

## 2025-02-22 RX ADMIN — INSULIN LISPRO 2 UNITS: 100 INJECTION, SOLUTION INTRAVENOUS; SUBCUTANEOUS at 17:50

## 2025-02-22 RX ADMIN — BACLOFEN 5 MG: 10 TABLET ORAL at 23:09

## 2025-02-22 RX ADMIN — MAGNESIUM SULFATE HEPTAHYDRATE 2 G: 40 INJECTION, SOLUTION INTRAVENOUS at 09:17

## 2025-02-22 RX ADMIN — INSULIN LISPRO 1 UNITS: 100 INJECTION, SOLUTION INTRAVENOUS; SUBCUTANEOUS at 21:07

## 2025-02-22 RX ADMIN — ATORVASTATIN CALCIUM 80 MG: 80 TABLET, FILM COATED ORAL at 17:49

## 2025-02-22 RX ADMIN — PANTOPRAZOLE SODIUM 40 MG: 40 TABLET, DELAYED RELEASE ORAL at 06:42

## 2025-02-22 RX ADMIN — BUDESONIDE AND FORMOTEROL FUMARATE DIHYDRATE 2 PUFF: 160; 4.5 AEROSOL RESPIRATORY (INHALATION) at 09:18

## 2025-02-22 RX ADMIN — AMLODIPINE BESYLATE 10 MG: 10 TABLET ORAL at 09:17

## 2025-02-22 RX ADMIN — BUDESONIDE AND FORMOTEROL FUMARATE DIHYDRATE 2 PUFF: 160; 4.5 AEROSOL RESPIRATORY (INHALATION) at 17:50

## 2025-02-22 RX ADMIN — CLOPIDOGREL BISULFATE 75 MG: 75 TABLET ORAL at 09:17

## 2025-02-22 RX ADMIN — NYSTATIN: 100000 POWDER TOPICAL at 09:18

## 2025-02-22 RX ADMIN — GABAPENTIN 300 MG: 300 CAPSULE ORAL at 17:49

## 2025-02-22 RX ADMIN — GABAPENTIN 600 MG: 300 CAPSULE ORAL at 23:09

## 2025-02-22 RX ADMIN — BACLOFEN 5 MG: 10 TABLET ORAL at 09:17

## 2025-02-22 RX ADMIN — BACLOFEN 5 MG: 10 TABLET ORAL at 17:49

## 2025-02-22 RX ADMIN — NYSTATIN: 100000 POWDER TOPICAL at 17:55

## 2025-02-22 RX ADMIN — ENOXAPARIN SODIUM 40 MG: 40 INJECTION SUBCUTANEOUS at 09:17

## 2025-02-22 RX ADMIN — Medication 500 MCG: at 09:17

## 2025-02-22 RX ADMIN — MIRTAZAPINE 7.5 MG: 7.5 TABLET, FILM COATED ORAL at 21:07

## 2025-02-22 RX ADMIN — LATANOPROST 1 DROP: 50 SOLUTION OPHTHALMIC at 21:10

## 2025-02-22 RX ADMIN — PROPRANOLOL HYDROCHLORIDE 60 MG: 60 CAPSULE, EXTENDED RELEASE ORAL at 09:17

## 2025-02-22 RX ADMIN — GABAPENTIN 300 MG: 300 CAPSULE ORAL at 09:17

## 2025-02-22 RX ADMIN — SENNOSIDES 17.2 MG: 8.6 TABLET ORAL at 21:06

## 2025-02-22 NOTE — ASSESSMENT & PLAN NOTE
Neurogenic bladder status post suprapubic tube  Suprapubic tube was changed out in the ED 2/10/2025  On 2/21 had significant pains again.  Catheter was irrigated with immediate output of 1750 mL urine

## 2025-02-22 NOTE — PLAN OF CARE
Problem: Potential for Falls  Goal: Patient will remain free of falls  Description: INTERVENTIONS:  - Educate patient/family on patient safety including physical limitations  - Instruct patient to call for assistance with activity   - Consult OT/PT to assist with strengthening/mobility   - Keep Call bell within reach  - Keep bed low and locked with side rails adjusted as appropriate  - Keep care items and personal belongings within reach  - Initiate and maintain comfort rounds  - Make Fall Risk Sign visible to staff  - Offer Toileting every 2 Hours, in advance of need  - Initiate/Maintain bed alarm  - Apply yellow socks and bracelet for high fall risk patients  - Consider moving patient to room near nurses station  Outcome: Progressing     Problem: PAIN - ADULT  Goal: Verbalizes/displays adequate comfort level or baseline comfort level  Description: Interventions:  - Encourage patient to monitor pain and request assistance  - Assess pain using appropriate pain scale  - Administer analgesics based on type and severity of pain and evaluate response  - Implement non-pharmacological measures as appropriate and evaluate response  - Consider cultural and social influences on pain and pain management  - Notify physician/advanced practitioner if interventions unsuccessful or patient reports new pain  Outcome: Progressing     Problem: DISCHARGE PLANNING  Goal: Discharge to home or other facility with appropriate resources  Description: INTERVENTIONS:  - Identify barriers to discharge w/patient and caregiver  - Arrange for needed discharge resources and transportation as appropriate  - Identify discharge learning needs (meds, wound care, etc.)  - Arrange for interpretive services to assist at discharge as needed  - Refer to Case Management Department for coordinating discharge planning if the patient needs post-hospital services based on physician/advanced practitioner order or complex needs related to functional status,  cognitive ability, or social support system  Outcome: Progressing     Problem: GASTROINTESTINAL - ADULT  Goal: Maintains or returns to baseline bowel function  Description: INTERVENTIONS:  - Assess bowel function  - Encourage oral fluids to ensure adequate hydration  - Administer IV fluids if ordered to ensure adequate hydration  - Administer ordered medications as needed  - Encourage mobilization and activity  - Consider nutritional services referral to assist patient with adequate nutrition and appropriate food choices  Outcome: Progressing     Problem: GENITOURINARY - ADULT  Goal: Urinary catheter remains patent  Description: INTERVENTIONS:  - Assess patency of urinary catheter  - If patient has a chronic dela cruz, consider changing catheter if non-functioning  - Follow guidelines for intermittent irrigation of non-functioning urinary catheter  Outcome: Progressing     Problem: METABOLIC, FLUID AND ELECTROLYTES - ADULT  Goal: Glucose maintained within target range  Description: INTERVENTIONS:  - Monitor Blood Glucose as ordered  - Assess for signs and symptoms of hyperglycemia and hypoglycemia  - Administer ordered medications to maintain glucose within target range  - Assess nutritional intake and initiate nutrition service referral as needed  Outcome: Progressing     Problem: Prexisting or High Potential for Compromised Skin Integrity  Goal: Skin integrity is maintained or improved  Description: INTERVENTIONS:  - Identify patients at risk for skin breakdown  - Assess and monitor skin integrity  - Assess and monitor nutrition and hydration status  - Monitor labs   - Assess for incontinence   - Turn and reposition patient  - Assist with mobility/ambulation  - Relieve pressure over bony prominences  - Avoid friction and shearing  - Provide appropriate hygiene as needed including keeping skin clean and dry  - Evaluate need for skin moisturizer/barrier cream  - Collaborate with interdisciplinary team   - Patient/family  teaching  - Consider wound care consult   Outcome: Progressing

## 2025-02-22 NOTE — PROGRESS NOTES
Progress Note - Hospitalist   Name: Mathew Rico 74 y.o. male I MRN: [id number]  Unit/Bed#: [place] [address] -01 I Date of Admission: 2/10/2025   Date of Service: 2/22/2025 I Hospital Day: 12    Assessment & Plan  Severe sepsis (HCC)  History of MS hypertension neurogenic bladder with suprapubic tube who presented to the hospital with generalized abdominal pain  Sepsis present on admission secondary to urinary tract infection  UTI due to urinary catheter.  SPT was changed in the ED 2/10/2025  Urine culture: Enterococcus sensitive to ertapenem  Completed course.  Disposition: Plan for SNF placement pending county and [facility] clearance  Urinary retention  Neurogenic bladder status post suprapubic tube  Suprapubic tube was changed out in the ED 2/10/2025  On 2/21 had significant pains again.  Catheter was irrigated with immediate output of 1750 mL urine  History of CVA (cerebrovascular accident)  History of stroke continue clopidogrel and atorvastatin  Primary hypertension  Continue amlodipine  Also on propranolol for history of essential tremors  Type 2 diabetes mellitus without complication, without long-term current use of insulin (HCC)  Continue sliding scale during hospitalization    Results from last 7 days   Lab Units 02/22/25  1156 02/22/25  0750 02/21/25  2122 02/21/25  1630 02/21/25  1155   POC GLUCOSE mg/dl 181* 148* 168* 156* 220*     Multiple sclerosis (HCC)  MS with neurogenic bladder status post suprapubic tube  Continue baclofen and gabapentin    VTE Pharmacologic Prophylaxis: VTE Score: 3 Moderate Risk (Score 3-4) - Pharmacological DVT Prophylaxis Ordered: enoxaparin (Lovenox).    Mobility:   Basic Mobility Inpatient Raw Score: 6  JH-HLM Goal: 2: Bed activities/Dependent transfer  JH-HLM Achieved: 2: Bed activities/Dependent transfer  JH-HLM Goal achieved. Continue to encourage appropriate mobility.    Patient Centered Rounds: I have performed bedside rounds with nursing staff today.  Discussions with  Specialists or Other Care Team Provider: Case management    Education and Discussions with Family / Patient: Updated  (brother-Guzman) via phone.    Current Length of Stay: 12 day(s)  Current Patient Status: Inpatient   Certification Statement: The patient will continue to require additional inpatient hospital stay due to awaiting placement  Discharge Plan:  Medically stable awaiting placement    Code Status: Level 1 - Full Code    Subjective   Patient seen and examined.  No complaints today.  No further abdominal pain.    Objective   Vitals:   Temp (24hrs), Av.4 °F (36.9 °C), Min:97.9 °F (36.6 °C), Max:98.9 °F (37.2 °C)    Temp:  [97.9 °F (36.6 °C)-98.9 °F (37.2 °C)] 97.9 °F (36.6 °C)  HR:  [66-67] 67  Resp:  [16] 16  BP: (108-122)/(41-58) 122/58  SpO2:  [92 %-94 %] 94 %  Body mass index is 24.26 kg/m².     Input and Output Summary (last 24 hours):     Intake/Output Summary (Last 24 hours) at 2025 1539  Last data filed at 2025 0604  Gross per 24 hour   Intake 540 ml   Output 1100 ml   Net -560 ml       Physical Exam  Vitals reviewed.   Constitutional:       General: He is not in acute distress.     Appearance: Normal appearance.   HENT:      Head: Atraumatic.   Eyes:      General: No scleral icterus.  Cardiovascular:      Rate and Rhythm: Regular rhythm.   Pulmonary:      Effort: Pulmonary effort is normal.      Breath sounds: Decreased breath sounds present. No wheezing.   Abdominal:      General: Bowel sounds are normal.      Palpations: Abdomen is soft.      Tenderness: There is no abdominal tenderness.   Musculoskeletal:         General: No swelling or tenderness.      Cervical back: Normal range of motion.   Skin:     Coloration: Skin is not jaundiced.   Neurological:      Mental Status: He is alert. Mental status is at baseline.      Motor: Weakness present.   Psychiatric:         Mood and Affect: Mood normal.       Lines/Drains:  Invasive Devices       Peripheral Intravenous Line   Duration             Peripheral IV 02/20/25 Dorsal (posterior);Left Hand 2 days              Drain  Duration             Suprapubic Catheter 20 Fr. 11 days                            Lab Results: I have reviewed the following results:   Results from last 7 days   Lab Units 02/22/25  0556 02/19/25  0538 02/17/25  0555   WBC Thousand/uL 10.11 8.90 6.91   HEMOGLOBIN g/dL 11.7* 11.8* 12.5   PLATELETS Thousands/uL 313 285 300   MCV fL 86 86 88     Results from last 7 days   Lab Units 02/22/25  0556 02/19/25  0538 02/17/25  0555   SODIUM mmol/L 138 138 137   POTASSIUM mmol/L 4.0 3.8 3.8   CHLORIDE mmol/L 106 107 105   CO2 mmol/L 28 23 24   ANION GAP mmol/L 4 8 8   BUN mg/dL 10 11 16   CREATININE mg/dL 0.83 0.72 0.84   CALCIUM mg/dL 9.1 8.7 9.1   ALBUMIN g/dL 3.1* 3.3*  --    TOTAL BILIRUBIN mg/dL 0.39 0.27  --    ALK PHOS U/L 64 62  --    ALT U/L 14 12  --    AST U/L 12* 19  --    EGFR ml/min/1.73sq m 86 91 85   GLUCOSE RANDOM mg/dL 133 149* 108     Results from last 7 days   Lab Units 02/22/25  0556 02/19/25  0538 02/17/25  0555   MAGNESIUM mg/dL 1.7* 1.7* 1.6*   PHOSPHORUS mg/dL 3.3 3.0  --                       Results from last 7 days   Lab Units 02/22/25  1156 02/22/25  0750 02/21/25  2122 02/21/25  1630 02/21/25  1155 02/21/25  0622 02/20/25  2046 02/20/25  1637 02/20/25  1129 02/20/25  0707 02/19/25  2108 02/19/25  1641   POC GLUCOSE mg/dl 181* 148* 168* 156* 220* 153* 196* 162* 240* 126 161* 131               Recent Cultures (last 7 days):         Imaging:  Reviewed radiology reports from this admission including:  XR chest 2 views  Result Date: 2/11/2025  Impression: No acute cardiopulmonary disease. Resident: Liban Lauren I, the attending radiologist, have reviewed the images and agree with the final report above. Workstation performed: [id number]     CT abdomen pelvis with contrast  Result Date: 2/10/2025  Impression: Suprapubic catheter present. Chronic bladder wall thickening and perivesical stranding,  mildly improved compared to January. Mild thickening about the distal rectum, also. Chronic. No new findings. Workstation performed: [id number]       Last 24 Hours Medication List:     Current Facility-Administered Medications:     acetaminophen (TYLENOL) tablet 650 mg, Q6H PRN    aluminum-magnesium hydroxide-simethicone (MAALOX) oral suspension 30 mL, Q4H PRN    amLODIPine (NORVASC) tablet 10 mg, Daily    atorvastatin (LIPITOR) tablet 80 mg, Daily With Dinner    baclofen tablet 5 mg, TID    budesonide-formoterol (SYMBICORT) 160-4.5 mcg/act inhaler 2 puff, BID    clopidogrel (PLAVIX) tablet 75 mg, Daily    cyanocobalamin (VITAMIN B-12) tablet 500 mcg, Daily    enoxaparin (LOVENOX) subcutaneous injection 40 mg, Daily    gabapentin (NEURONTIN) capsule 300 mg, BID    gabapentin (NEURONTIN) capsule 600 mg, HS    insulin lispro (HumALOG/ADMELOG) 100 units/mL subcutaneous injection 1-5 Units, TID AC **AND** Fingerstick Glucose (POCT), TID AC    insulin lispro (HumALOG/ADMELOG) 100 units/mL subcutaneous injection 1-5 Units, HS    latanoprost (XALATAN) 0.005 % ophthalmic solution 1 drop, HS    melatonin tablet 3 mg, HS PRN    mirtazapine (REMERON) tablet 7.5 mg, HS    nystatin (MYCOSTATIN) powder, BID    ondansetron (ZOFRAN) injection 4 mg, Q4H PRN    oxyCODONE (ROXICODONE) IR tablet 5 mg, Q6H PRN    pantoprazole (PROTONIX) EC tablet 40 mg, Early Morning    polyethylene glycol (MIRALAX) packet 17 g, Daily    propranolol (INDERAL LA) 24 hr capsule 60 mg, Daily    senna (SENOKOT) tablet 17.2 mg, HS    Administrative Statements   Today, Patient Was Seen By: Shon Mendez, DO  I have spent a total time of 35 minutes in caring for this patient on the day of the visit/encounter including Patient and family education, Documenting in the medical record, Reviewing/placing orders in the medical record (including tests, medications, and/or procedures), and Communicating with other healthcare professionals .    **Please Note: This note  may have been constructed using a voice recognition system.**

## 2025-02-22 NOTE — ASSESSMENT & PLAN NOTE
History of MS hypertension neurogenic bladder with suprapubic tube who presented to the hospital with generalized abdominal pain  Sepsis present on admission secondary to urinary tract infection  UTI due to urinary catheter.  SPT was changed in the ED 2/10/2025  Urine culture: Enterococcus sensitive to ertapenem  Completed course.  Disposition: Plan for SNF placement pending [facility] and [facility] clearance

## 2025-02-22 NOTE — ASSESSMENT & PLAN NOTE
Continue sliding scale during hospitalization    Results from last 7 days   Lab Units 02/22/25  1156 02/22/25  0750 02/21/25  2122 02/21/25  1630 02/21/25  1155   POC GLUCOSE mg/dl 181* 148* 168* 156* 220*

## 2025-02-23 LAB
GLUCOSE SERPL-MCNC: 145 MG/DL (ref 65–140)
GLUCOSE SERPL-MCNC: 182 MG/DL (ref 65–140)
GLUCOSE SERPL-MCNC: 187 MG/DL (ref 65–140)
GLUCOSE SERPL-MCNC: 205 MG/DL (ref 65–140)

## 2025-02-23 PROCEDURE — 82948 REAGENT STRIP/BLOOD GLUCOSE: CPT

## 2025-02-23 PROCEDURE — 99232 SBSQ HOSP IP/OBS MODERATE 35: CPT | Performed by: INTERNAL MEDICINE

## 2025-02-23 RX ADMIN — NYSTATIN: 100000 POWDER TOPICAL at 16:14

## 2025-02-23 RX ADMIN — GABAPENTIN 600 MG: 300 CAPSULE ORAL at 21:32

## 2025-02-23 RX ADMIN — NYSTATIN: 100000 POWDER TOPICAL at 08:49

## 2025-02-23 RX ADMIN — ATORVASTATIN CALCIUM 80 MG: 80 TABLET, FILM COATED ORAL at 16:08

## 2025-02-23 RX ADMIN — INSULIN LISPRO 1 UNITS: 100 INJECTION, SOLUTION INTRAVENOUS; SUBCUTANEOUS at 16:14

## 2025-02-23 RX ADMIN — BUDESONIDE AND FORMOTEROL FUMARATE DIHYDRATE 2 PUFF: 160; 4.5 AEROSOL RESPIRATORY (INHALATION) at 16:08

## 2025-02-23 RX ADMIN — BUDESONIDE AND FORMOTEROL FUMARATE DIHYDRATE 2 PUFF: 160; 4.5 AEROSOL RESPIRATORY (INHALATION) at 08:46

## 2025-02-23 RX ADMIN — GABAPENTIN 300 MG: 300 CAPSULE ORAL at 16:08

## 2025-02-23 RX ADMIN — BACLOFEN 5 MG: 10 TABLET ORAL at 21:32

## 2025-02-23 RX ADMIN — MIRTAZAPINE 7.5 MG: 7.5 TABLET, FILM COATED ORAL at 21:32

## 2025-02-23 RX ADMIN — POLYETHYLENE GLYCOL 3350 17 G: 17 POWDER, FOR SOLUTION ORAL at 08:46

## 2025-02-23 RX ADMIN — LATANOPROST 1 DROP: 50 SOLUTION OPHTHALMIC at 21:34

## 2025-02-23 RX ADMIN — ENOXAPARIN SODIUM 40 MG: 40 INJECTION SUBCUTANEOUS at 08:45

## 2025-02-23 RX ADMIN — INSULIN LISPRO 1 UNITS: 100 INJECTION, SOLUTION INTRAVENOUS; SUBCUTANEOUS at 21:34

## 2025-02-23 RX ADMIN — Medication 500 MCG: at 08:44

## 2025-02-23 RX ADMIN — PANTOPRAZOLE SODIUM 40 MG: 40 TABLET, DELAYED RELEASE ORAL at 06:05

## 2025-02-23 RX ADMIN — GABAPENTIN 300 MG: 300 CAPSULE ORAL at 08:45

## 2025-02-23 RX ADMIN — AMLODIPINE BESYLATE 10 MG: 10 TABLET ORAL at 08:44

## 2025-02-23 RX ADMIN — SENNOSIDES 17.2 MG: 8.6 TABLET ORAL at 21:32

## 2025-02-23 RX ADMIN — BACLOFEN 5 MG: 10 TABLET ORAL at 08:44

## 2025-02-23 RX ADMIN — CLOPIDOGREL BISULFATE 75 MG: 75 TABLET ORAL at 08:44

## 2025-02-23 RX ADMIN — PROPRANOLOL HYDROCHLORIDE 60 MG: 60 CAPSULE, EXTENDED RELEASE ORAL at 08:45

## 2025-02-23 RX ADMIN — BACLOFEN 5 MG: 10 TABLET ORAL at 16:08

## 2025-02-23 RX ADMIN — INSULIN LISPRO 1 UNITS: 100 INJECTION, SOLUTION INTRAVENOUS; SUBCUTANEOUS at 11:48

## 2025-02-23 NOTE — PROGRESS NOTES
Progress Note - Hospitalist   Name: Mathew Rico 74 y.o. male I MRN: [id number]  Unit/Bed#: [place] [address] -01 I Date of Admission: 2/10/2025   Date of Service: 2/23/2025 I Hospital Day: 13    Assessment & Plan  Severe sepsis (HCC)  History of MS hypertension neurogenic bladder with suprapubic tube who presented to the hospital with generalized abdominal pain  Sepsis present on admission secondary to urinary tract infection  UTI due to urinary catheter.  SPT was changed in the ED 2/10/2025  Urine culture: Enterococcus sensitive to ertapenem.  Completed course.  Disposition: Plan for SNF placement pending county and [facility] clearance  Urinary retention  Neurogenic bladder status post suprapubic tube  Suprapubic tube was changed out in the ED 2/10/2025  On 2/21 had significant pains again.  Catheter was irrigated with immediate output of 1750 mL urine  History of CVA (cerebrovascular accident)  History of stroke continue clopidogrel and atorvastatin  Primary hypertension  Continue amlodipine  Also on propranolol for history of essential tremors  Type 2 diabetes mellitus without complication, without long-term current use of insulin (HCC)  Continue sliding scale during hospitalization    Results from last 7 days   Lab Units 02/23/25  1104 02/23/25  0811 02/22/25  2054 02/22/25  1724 02/22/25  1156   POC GLUCOSE mg/dl 187* 145* 154* 228* 181*     Multiple sclerosis (HCC)  MS with neurogenic bladder status post suprapubic tube  Continue baclofen and gabapentin    VTE Pharmacologic Prophylaxis: VTE Score: 3 Moderate Risk (Score 3-4) - Pharmacological DVT Prophylaxis Ordered: enoxaparin (Lovenox).    Mobility:   Basic Mobility Inpatient Raw Score: 6  JH-HLM Goal: 2: Bed activities/Dependent transfer  JH-HLM Achieved: 2: Bed activities/Dependent transfer  JH-HLM Goal achieved. Continue to encourage appropriate mobility.    Patient Centered Rounds: I have performed bedside rounds with nursing staff today.  Discussions with  Specialists or Other Care Team Provider: Case management    Education and Discussions with Family / Patient: Updated  (brother) via phone.    Current Length of Stay: 13 day(s)  Current Patient Status: Inpatient   Certification Statement: The patient will continue to require additional inpatient hospital stay due to awaiting [place] clearance to go to rehab  Discharge Plan: Anticipate discharge tomorrow to rehab facility.    Code Status: Level 1 - Full Code    Subjective   Patient seen and examined.  No new complaints.  Asking about timing of discharge.    Objective   Vitals:   Temp (24hrs), Av.4 °F (36.9 °C), Min:97.6 °F (36.4 °C), Max:98.8 °F (37.1 °C)    Temp:  [97.6 °F (36.4 °C)-98.8 °F (37.1 °C)] 97.6 °F (36.4 °C)  HR:  [59-68] 59  Resp:  [16] 16  BP: (113-123)/(40-57) 115/55  SpO2:  [92 %-93 %] 93 %  Body mass index is 24.26 kg/m².     Input and Output Summary (last 24 hours):     Intake/Output Summary (Last 24 hours) at 2025 1310  Last data filed at 2025 0614  Gross per 24 hour   Intake 1080 ml   Output 2250 ml   Net -1170 ml       Physical Exam  Vitals reviewed.   Constitutional:       General: He is not in acute distress.  HENT:      Head: Atraumatic.   Eyes:      General: No scleral icterus.     Extraocular Movements: Extraocular movements intact.   Cardiovascular:      Rate and Rhythm: Regular rhythm.      Heart sounds:      No gallop.   Pulmonary:      Effort: Pulmonary effort is normal. No respiratory distress.      Breath sounds: No wheezing.   Abdominal:      General: Bowel sounds are normal.      Palpations: Abdomen is soft.      Tenderness: There is no abdominal tenderness.   Musculoskeletal:         General: No swelling.   Skin:     General: Skin is warm and dry.      Coloration: Skin is not jaundiced.   Neurological:      Mental Status: He is alert. Mental status is at baseline.   Psychiatric:         Mood and Affect: Mood normal.       Lines/Drains:  Invasive Devices        Peripheral Intravenous Line  Duration             Peripheral IV 02/20/25 Dorsal (posterior);Left Hand 3 days              Drain  Duration             Suprapubic Catheter 20 Fr. 12 days                            Lab Results: I have reviewed the following results:   Results from last 7 days   Lab Units 02/22/25  0556 02/19/25  0538 02/17/25  0555   WBC Thousand/uL 10.11 8.90 6.91   HEMOGLOBIN g/dL 11.7* 11.8* 12.5   PLATELETS Thousands/uL 313 285 300   MCV fL 86 86 88     Results from last 7 days   Lab Units 02/22/25  0556 02/19/25  0538 02/17/25  0555   SODIUM mmol/L 138 138 137   POTASSIUM mmol/L 4.0 3.8 3.8   CHLORIDE mmol/L 106 107 105   CO2 mmol/L 28 23 24   ANION GAP mmol/L 4 8 8   BUN mg/dL 10 11 16   CREATININE mg/dL 0.83 0.72 0.84   CALCIUM mg/dL 9.1 8.7 9.1   ALBUMIN g/dL 3.1* 3.3*  --    TOTAL BILIRUBIN mg/dL 0.39 0.27  --    ALK PHOS U/L 64 62  --    ALT U/L 14 12  --    AST U/L 12* 19  --    EGFR ml/min/1.73sq m 86 91 85   GLUCOSE RANDOM mg/dL 133 149* 108     Results from last 7 days   Lab Units 02/22/25  0556 02/19/25  0538 02/17/25  0555   MAGNESIUM mg/dL 1.7* 1.7* 1.6*   PHOSPHORUS mg/dL 3.3 3.0  --                       Results from last 7 days   Lab Units 02/23/25  1104 02/23/25  0811 02/22/25  2054 02/22/25  1724 02/22/25  1156 02/22/25  0750 02/21/25  2122 02/21/25  1630 02/21/25  1155 02/21/25  0622 02/20/25  2046 02/20/25  1637   POC GLUCOSE mg/dl 187* 145* 154* 228* 181* 148* 168* 156* 220* 153* 196* 162*               Recent Cultures (last 7 days):         Imaging:  Reviewed radiology reports from this admission including:  XR chest 2 views  Result Date: 2/11/2025  Impression: No acute cardiopulmonary disease. Resident: Liban Lauren I, the attending radiologist, have reviewed the images and agree with the final report above. Workstation performed: [id number]     CT abdomen pelvis with contrast  Result Date: 2/10/2025  Impression: Suprapubic catheter present. Chronic bladder wall thickening  and perivesical stranding, mildly improved compared to January. Mild thickening about the distal rectum, also. Chronic. No new findings. Workstation performed: [id number]       Last 24 Hours Medication List:     Current Facility-Administered Medications:     acetaminophen (TYLENOL) tablet 650 mg, Q6H PRN    aluminum-magnesium hydroxide-simethicone (MAALOX) oral suspension 30 mL, Q4H PRN    amLODIPine (NORVASC) tablet 10 mg, Daily    atorvastatin (LIPITOR) tablet 80 mg, Daily With Dinner    baclofen tablet 5 mg, TID    budesonide-formoterol (SYMBICORT) 160-4.5 mcg/act inhaler 2 puff, BID    clopidogrel (PLAVIX) tablet 75 mg, Daily    cyanocobalamin (VITAMIN B-12) tablet 500 mcg, Daily    enoxaparin (LOVENOX) subcutaneous injection 40 mg, Daily    gabapentin (NEURONTIN) capsule 300 mg, BID    gabapentin (NEURONTIN) capsule 600 mg, HS    insulin lispro (HumALOG/ADMELOG) 100 units/mL subcutaneous injection 1-5 Units, TID AC **AND** Fingerstick Glucose (POCT), TID AC    insulin lispro (HumALOG/ADMELOG) 100 units/mL subcutaneous injection 1-5 Units, HS    latanoprost (XALATAN) 0.005 % ophthalmic solution 1 drop, HS    melatonin tablet 3 mg, HS PRN    mirtazapine (REMERON) tablet 7.5 mg, HS    nystatin (MYCOSTATIN) powder, BID    ondansetron (ZOFRAN) injection 4 mg, Q4H PRN    oxyCODONE (ROXICODONE) IR tablet 5 mg, Q6H PRN    pantoprazole (PROTONIX) EC tablet 40 mg, Early Morning    polyethylene glycol (MIRALAX) packet 17 g, Daily    propranolol (INDERAL LA) 24 hr capsule 60 mg, Daily    senna (SENOKOT) tablet 17.2 mg, HS    Administrative Statements   Today, Patient Was Seen By: Shon Mendez, DO  I have spent a total time of 35 minutes in caring for this patient on the day of the visit/encounter including Documenting in the medical record, Reviewing/placing orders in the medical record (including tests, medications, and/or procedures), and Communicating with other healthcare professionals .    **Please Note: This note  may have been constructed using a voice recognition system.**

## 2025-02-23 NOTE — ASSESSMENT & PLAN NOTE
Continue sliding scale during hospitalization    Results from last 7 days   Lab Units 02/23/25  1104 02/23/25  0811 02/22/25  2054 02/22/25  1724 02/22/25  1156   POC GLUCOSE mg/dl 187* 145* 154* 228* 181*

## 2025-02-23 NOTE — PLAN OF CARE
Problem: Potential for Falls  Goal: Patient will remain free of falls  Description: INTERVENTIONS:  - Educate patient/family on patient safety including physical limitations  - Instruct patient to call for assistance with activity   - Consult OT/PT to assist with strengthening/mobility   - Keep Call bell within reach  - Keep bed low and locked with side rails adjusted as appropriate  - Keep care items and personal belongings within reach  - Initiate and maintain comfort rounds  - Make Fall Risk Sign visible to staff  - Offer Toileting every 2 Hours, in advance of need  - Initiate/Maintain bed alarm  - Apply yellow socks and bracelet for high fall risk patients  - Consider moving patient to room near nurses station  Outcome: Progressing     Problem: PAIN - ADULT  Goal: Verbalizes/displays adequate comfort level or baseline comfort level  Description: Interventions:  - Encourage patient to monitor pain and request assistance  - Assess pain using appropriate pain scale  - Administer analgesics based on type and severity of pain and evaluate response  - Implement non-pharmacological measures as appropriate and evaluate response  - Consider cultural and social influences on pain and pain management  - Notify physician/advanced practitioner if interventions unsuccessful or patient reports new pain  Outcome: Progressing     Problem: INFECTION - ADULT  Goal: Absence or prevention of progression during hospitalization  Description: INTERVENTIONS:  - Assess and monitor for signs and symptoms of infection  - Monitor lab/diagnostic results  - Monitor all insertion sites, i.e. indwelling lines, tubes, and drains  - Monitor endotracheal if appropriate and nasal secretions for changes in amount and color  - [place] appropriate cooling/warming therapies per order  - Administer medications as ordered  - Instruct and encourage patient and family to use good hand hygiene technique  - Identify and instruct in appropriate isolation  precautions for identified infection/condition  Outcome: Progressing     Problem: DISCHARGE PLANNING  Goal: Discharge to home or other facility with appropriate resources  Description: INTERVENTIONS:  - Identify barriers to discharge w/patient and caregiver  - Arrange for needed discharge resources and transportation as appropriate  - Identify discharge learning needs (meds, wound care, etc.)  - Arrange for interpretive services to assist at discharge as needed  - Refer to Case Management Department for coordinating discharge planning if the patient needs post-hospital services based on physician/advanced practitioner order or complex needs related to functional status, cognitive ability, or social support system  Outcome: Progressing     Problem: Knowledge Deficit  Goal: Patient/family/caregiver demonstrates understanding of disease process, treatment plan, medications, and discharge instructions  Description: Complete learning assessment and assess knowledge base.  Interventions:  - Provide teaching at level of understanding  - Provide teaching via preferred learning methods  Outcome: Progressing     Problem: GASTROINTESTINAL - ADULT  Goal: Minimal or absence of nausea and/or vomiting  Description: INTERVENTIONS:  - Administer IV fluids if ordered to ensure adequate hydration  - Maintain NPO status until nausea and vomiting are resolved  - Nasogastric tube if ordered  - Administer ordered antiemetic medications as needed  - Provide nonpharmacologic comfort measures as appropriate  - Advance diet as tolerated, if ordered  - Consider nutrition services referral to assist patient with adequate nutrition and appropriate food choices  Outcome: Progressing  Goal: Maintains or returns to baseline bowel function  Description: INTERVENTIONS:  - Assess bowel function  - Encourage oral fluids to ensure adequate hydration  - Administer IV fluids if ordered to ensure adequate hydration  - Administer ordered medications as  needed  - Encourage mobilization and activity  - Consider nutritional services referral to assist patient with adequate nutrition and appropriate food choices  Outcome: Progressing     Problem: GENITOURINARY - ADULT  Goal: Urinary catheter remains patent  Description: INTERVENTIONS:  - Assess patency of urinary catheter  - If patient has a chronic dela cruz, consider changing catheter if non-functioning  - Follow guidelines for intermittent irrigation of non-functioning urinary catheter  Outcome: Progressing     Problem: METABOLIC, FLUID AND ELECTROLYTES - ADULT  Goal: Electrolytes maintained within normal limits  Description: INTERVENTIONS:  - Monitor labs and assess patient for signs and symptoms of electrolyte imbalances  - Administer electrolyte replacement as ordered  - Monitor response to electrolyte replacements, including repeat lab results as appropriate  - Instruct patient on fluid and nutrition as appropriate  Outcome: Progressing  Goal: Fluid balance maintained  Description: INTERVENTIONS:  - Monitor labs   - Monitor I/O and WT  - Instruct patient on fluid and nutrition as appropriate  - Assess for signs & symptoms of volume excess or deficit  Outcome: Progressing  Goal: Glucose maintained within target range  Description: INTERVENTIONS:  - Monitor Blood Glucose as ordered  - Assess for signs and symptoms of hyperglycemia and hypoglycemia  - Administer ordered medications to maintain glucose within target range  - Assess nutritional intake and initiate nutrition service referral as needed  Outcome: Progressing     Problem: Prexisting or High Potential for Compromised Skin Integrity  Goal: Skin integrity is maintained or improved  Description: INTERVENTIONS:  - Identify patients at risk for skin breakdown  - Assess and monitor skin integrity  - Assess and monitor nutrition and hydration status  - Monitor labs   - Assess for incontinence   - Turn and reposition patient  - Assist with mobility/ambulation  -  Relieve pressure over bony prominences  - Avoid friction and shearing  - Provide appropriate hygiene as needed including keeping skin clean and dry  - Evaluate need for skin moisturizer/barrier cream  - Collaborate with interdisciplinary team   - Patient/family teaching  - Consider wound care consult   Outcome: Progressing     Problem: Nutrition/Hydration-ADULT  Goal: Nutrient/Hydration intake appropriate for improving, restoring or maintaining nutritional needs  Description: Monitor and assess patient's nutrition/hydration status for malnutrition. Collaborate with interdisciplinary team and initiate plan and interventions as ordered.  Monitor patient's weight and dietary intake as ordered or per policy. Utilize nutrition screening tool and intervene as necessary. Determine patient's food preferences and provide high-protein, high-caloric foods as appropriate.     INTERVENTIONS:  - Monitor oral intake, urinary output, labs, and treatment plans  - Assess nutrition and hydration status and recommend course of action  - Evaluate amount of meals eaten  - Assist patient with eating if necessary   - Allow adequate time for meals  - Recommend/ encourage appropriate diets, oral nutritional supplements, and vitamin/mineral supplements  - Order, calculate, and assess calorie counts as needed  - Recommend, monitor, and adjust tube feedings and TPN/PPN based on assessed needs  - Assess need for intravenous fluids  - Provide specific nutrition/hydration education as appropriate  - Include patient/family/caregiver in decisions related to nutrition  Outcome: Progressing

## 2025-02-23 NOTE — ASSESSMENT & PLAN NOTE
History of MS hypertension neurogenic bladder with suprapubic tube who presented to the hospital with generalized abdominal pain  Sepsis present on admission secondary to urinary tract infection  UTI due to urinary catheter.  SPT was changed in the ED 2/10/2025  Urine culture: Enterococcus sensitive to ertapenem.  Completed course.  Disposition: Plan for SNF placement pending county and [facility] clearance

## 2025-02-24 LAB
ALBUMIN SERPL BCG-MCNC: 3.5 G/DL (ref 3.5–5)
ALP SERPL-CCNC: 66 U/L (ref 34–104)
ALT SERPL W P-5'-P-CCNC: 17 U/L (ref 7–52)
ANION GAP SERPL CALCULATED.3IONS-SCNC: 6 MMOL/L (ref 4–13)
AST SERPL W P-5'-P-CCNC: 16 U/L (ref 13–39)
BILIRUB SERPL-MCNC: 0.4 MG/DL (ref 0.2–1)
BUN SERPL-MCNC: 12 MG/DL (ref 5–25)
CALCIUM SERPL-MCNC: 9 MG/DL (ref 8.4–10.2)
CHLORIDE SERPL-SCNC: 105 MMOL/L (ref 96–108)
CO2 SERPL-SCNC: 25 MMOL/L (ref 21–32)
CREAT SERPL-MCNC: 0.81 MG/DL (ref 0.6–1.3)
ERYTHROCYTE [DISTWIDTH] IN BLOOD BY AUTOMATED COUNT: 15.5 % (ref 11.6–15.1)
GFR SERPL CREATININE-BSD FRML MDRD: 86 ML/MIN/1.73SQ M
GLUCOSE SERPL-MCNC: 130 MG/DL (ref 65–140)
GLUCOSE SERPL-MCNC: 143 MG/DL (ref 65–140)
GLUCOSE SERPL-MCNC: 159 MG/DL (ref 65–140)
GLUCOSE SERPL-MCNC: 225 MG/DL (ref 65–140)
GLUCOSE SERPL-MCNC: 243 MG/DL (ref 65–140)
HCT VFR BLD AUTO: 35.8 % (ref 36.5–49.3)
HGB BLD-MCNC: 11.3 G/DL (ref 12–17)
MAGNESIUM SERPL-MCNC: 1.7 MG/DL (ref 1.9–2.7)
MCH RBC QN AUTO: 27 PG (ref 26.8–34.3)
MCHC RBC AUTO-ENTMCNC: 31.6 G/DL (ref 31.4–37.4)
MCV RBC AUTO: 85 FL (ref 82–98)
PHOSPHATE SERPL-MCNC: 3 MG/DL (ref 2.3–4.1)
PLATELET # BLD AUTO: 382 THOUSANDS/UL (ref 149–390)
PMV BLD AUTO: 8.8 FL (ref 8.9–12.7)
POTASSIUM SERPL-SCNC: 4.2 MMOL/L (ref 3.5–5.3)
PROT SERPL-MCNC: 6.7 G/DL (ref 6.4–8.4)
RBC # BLD AUTO: 4.19 MILLION/UL (ref 3.88–5.62)
SODIUM SERPL-SCNC: 136 MMOL/L (ref 135–147)
WBC # BLD AUTO: 13.09 THOUSAND/UL (ref 4.31–10.16)

## 2025-02-24 PROCEDURE — 99232 SBSQ HOSP IP/OBS MODERATE 35: CPT | Performed by: INTERNAL MEDICINE

## 2025-02-24 PROCEDURE — 83735 ASSAY OF MAGNESIUM: CPT | Performed by: INTERNAL MEDICINE

## 2025-02-24 PROCEDURE — 82948 REAGENT STRIP/BLOOD GLUCOSE: CPT

## 2025-02-24 PROCEDURE — 80053 COMPREHEN METABOLIC PANEL: CPT | Performed by: INTERNAL MEDICINE

## 2025-02-24 PROCEDURE — 84100 ASSAY OF PHOSPHORUS: CPT | Performed by: INTERNAL MEDICINE

## 2025-02-24 PROCEDURE — 85027 COMPLETE CBC AUTOMATED: CPT | Performed by: INTERNAL MEDICINE

## 2025-02-24 RX ADMIN — GABAPENTIN 600 MG: 300 CAPSULE ORAL at 21:42

## 2025-02-24 RX ADMIN — CLOPIDOGREL BISULFATE 75 MG: 75 TABLET ORAL at 07:35

## 2025-02-24 RX ADMIN — POLYETHYLENE GLYCOL 3350 17 G: 17 POWDER, FOR SOLUTION ORAL at 07:33

## 2025-02-24 RX ADMIN — BACLOFEN 5 MG: 10 TABLET ORAL at 07:35

## 2025-02-24 RX ADMIN — AMLODIPINE BESYLATE 10 MG: 10 TABLET ORAL at 07:36

## 2025-02-24 RX ADMIN — GABAPENTIN 300 MG: 300 CAPSULE ORAL at 16:11

## 2025-02-24 RX ADMIN — INSULIN LISPRO 2 UNITS: 100 INJECTION, SOLUTION INTRAVENOUS; SUBCUTANEOUS at 12:17

## 2025-02-24 RX ADMIN — BUDESONIDE AND FORMOTEROL FUMARATE DIHYDRATE 2 PUFF: 160; 4.5 AEROSOL RESPIRATORY (INHALATION) at 07:35

## 2025-02-24 RX ADMIN — BACLOFEN 5 MG: 10 TABLET ORAL at 21:42

## 2025-02-24 RX ADMIN — INSULIN LISPRO 1 UNITS: 100 INJECTION, SOLUTION INTRAVENOUS; SUBCUTANEOUS at 08:44

## 2025-02-24 RX ADMIN — BUDESONIDE AND FORMOTEROL FUMARATE DIHYDRATE 2 PUFF: 160; 4.5 AEROSOL RESPIRATORY (INHALATION) at 16:09

## 2025-02-24 RX ADMIN — LATANOPROST 1 DROP: 50 SOLUTION OPHTHALMIC at 21:43

## 2025-02-24 RX ADMIN — BACLOFEN 5 MG: 10 TABLET ORAL at 16:11

## 2025-02-24 RX ADMIN — SENNOSIDES 17.2 MG: 8.6 TABLET ORAL at 21:42

## 2025-02-24 RX ADMIN — ENOXAPARIN SODIUM 40 MG: 40 INJECTION SUBCUTANEOUS at 07:33

## 2025-02-24 RX ADMIN — PANTOPRAZOLE SODIUM 40 MG: 40 TABLET, DELAYED RELEASE ORAL at 05:20

## 2025-02-24 RX ADMIN — GABAPENTIN 300 MG: 300 CAPSULE ORAL at 07:35

## 2025-02-24 RX ADMIN — NYSTATIN: 100000 POWDER TOPICAL at 16:09

## 2025-02-24 RX ADMIN — PROPRANOLOL HYDROCHLORIDE 60 MG: 60 CAPSULE, EXTENDED RELEASE ORAL at 07:35

## 2025-02-24 RX ADMIN — NYSTATIN: 100000 POWDER TOPICAL at 07:35

## 2025-02-24 RX ADMIN — MIRTAZAPINE 7.5 MG: 7.5 TABLET, FILM COATED ORAL at 21:42

## 2025-02-24 RX ADMIN — ATORVASTATIN CALCIUM 80 MG: 80 TABLET, FILM COATED ORAL at 16:11

## 2025-02-24 RX ADMIN — Medication 500 MCG: at 07:35

## 2025-02-24 RX ADMIN — INSULIN LISPRO 2 UNITS: 100 INJECTION, SOLUTION INTRAVENOUS; SUBCUTANEOUS at 21:42

## 2025-02-24 NOTE — PROGRESS NOTES
Progress Note - Hospitalist   Name: Mathew Rico 74 y.o. male I MRN: [id number]  Unit/Bed#: [place] [address] -01 I Date of Admission: 2/10/2025   Date of Service: 2/24/2025 I Hospital Day: 14    Assessment & Plan  Severe sepsis (HCC)  History of MS hypertension neurogenic bladder with suprapubic tube who presented to the hospital with generalized abdominal pain  Sepsis present on admission secondary to urinary tract infection  UTI due to urinary catheter.  SPT was changed in the ED 2/10/2025  Urine culture: Enterococcus sensitive to ertapenem.  Completed course.  Disposition: Plan for SNF placement pending county and [facility] clearance  Urinary retention  Neurogenic bladder status post suprapubic tube  Suprapubic tube was changed out in the ED 2/10/2025  On 2/21 had significant pains again.  Catheter was irrigated with immediate output of 1750 mL urine  History of CVA (cerebrovascular accident)  History of stroke continue clopidogrel and atorvastatin  Primary hypertension  Continue amlodipine  Also on propranolol for history of essential tremors  Type 2 diabetes mellitus without complication, without long-term current use of insulin (HCC)  Continue sliding scale during hospitalization    Results from last 7 days   Lab Units 02/24/25  1145 02/24/25  0759 02/23/25  2126 02/23/25  1608 02/23/25  1104   POC GLUCOSE mg/dl 225* 159* 205* 182* 187*     Multiple sclerosis (HCC)  MS with neurogenic bladder status post suprapubic tube  Continue baclofen and gabapentin        Subjective:  The patient feels pretty well.  He slept okay.  He is eating.  He denied any chest pain, shortness of breath, abdominal pain, nausea, or vomiting.    Physical Exam:   Temp:  [98 °F (36.7 °C)-98.8 °F (37.1 °C)] 98.8 °F (37.1 °C)  HR:  [63-64] 64  BP: (121-135)/(52-57) 135/57  Resp:  [16-20] 20  SpO2:  [92 %-94 %] 94 %  O2 Device: None (Room air)    Gen: Well-developed, well-nourished, in no distress  Neck: Supple.  No lymphadenopathy or  goiter  Heart: Giller rhythm.  I heard no murmur, gallop, or rub.  Lungs: Clear to auscultation and percussion.  No wheezing, rales, or rhonchi.  Abd: Soft with active bowel sounds.  No mass or tenderness.  Extremities: No clubbing, cyanosis, or edema.  No calf tenderness.  Neuro: Alert and oriented.  Moves all limbs.  Skin: Warm and dry.      LABS:   CBC:   Lab Results   Component Value Date    WBC 13.09 (H) 02/24/2025    HGB 11.3 (L) 02/24/2025    HCT 35.8 (L) 02/24/2025    MCV 85 02/24/2025     02/24/2025    RBC 4.19 02/24/2025    MCH 27.0 02/24/2025    MCHC 31.6 02/24/2025    RDW 15.5 (H) 02/24/2025    MPV 8.8 (L) 02/24/2025   , CMP:   Lab Results   Component Value Date    SODIUM 136 02/24/2025    K 4.2 02/24/2025     02/24/2025    CO2 25 02/24/2025    BUN 12 02/24/2025    CREATININE 0.81 02/24/2025    CALCIUM 9.0 02/24/2025    AST 16 02/24/2025    ALT 17 02/24/2025    ALKPHOS 66 02/24/2025    EGFR 86 02/24/2025             VTE Pharmacologic Prophylaxis: Enoxaparin (Lovenox)  VTE Mechanical Prophylaxis: sequential compression device

## 2025-02-24 NOTE — ASSESSMENT & PLAN NOTE
History of MS hypertension neurogenic bladder with suprapubic tube who presented to the hospital with generalized abdominal pain  Sepsis present on admission secondary to urinary tract infection  UTI due to urinary catheter.  SPT was changed in the ED 2/10/2025  Urine culture: Enterococcus sensitive to ertapenem.  Completed course.  Disposition: Plan for SNF placement pending county and [facility] clearance

## 2025-02-24 NOTE — PLAN OF CARE
Problem: Potential for Falls  Goal: Patient will remain free of falls  Description: INTERVENTIONS:  - Educate patient/family on patient safety including physical limitations  - Instruct patient to call for assistance with activity   - Consult OT/PT to assist with strengthening/mobility   - Keep Call bell within reach  - Keep bed low and locked with side rails adjusted as appropriate  - Keep care items and personal belongings within reach  - Initiate and maintain comfort rounds  - Make Fall Risk Sign visible to staff  - Offer Toileting every 2 Hours, in advance of need  - Initiate/Maintain bed alarm  - Apply yellow socks and bracelet for high fall risk patients  - Consider moving patient to room near nurses station  Outcome: Progressing     Problem: PAIN - ADULT  Goal: Verbalizes/displays adequate comfort level or baseline comfort level  Description: Interventions:  - Encourage patient to monitor pain and request assistance  - Assess pain using appropriate pain scale  - Administer analgesics based on type and severity of pain and evaluate response  - Implement non-pharmacological measures as appropriate and evaluate response  - Consider cultural and social influences on pain and pain management  - Notify physician/advanced practitioner if interventions unsuccessful or patient reports new pain  Outcome: Progressing     Problem: INFECTION - ADULT  Goal: Absence or prevention of progression during hospitalization  Description: INTERVENTIONS:  - Assess and monitor for signs and symptoms of infection  - Monitor lab/diagnostic results  - Monitor all insertion sites, i.e. indwelling lines, tubes, and drains  - Monitor endotracheal if appropriate and nasal secretions for changes in amount and color  - [place] appropriate cooling/warming therapies per order  - Administer medications as ordered  - Instruct and encourage patient and family to use good hand hygiene technique  - Identify and instruct in appropriate isolation  precautions for identified infection/condition  Outcome: Progressing     Problem: DISCHARGE PLANNING  Goal: Discharge to home or other facility with appropriate resources  Description: INTERVENTIONS:  - Identify barriers to discharge w/patient and caregiver  - Arrange for needed discharge resources and transportation as appropriate  - Identify discharge learning needs (meds, wound care, etc.)  - Arrange for interpretive services to assist at discharge as needed  - Refer to Case Management Department for coordinating discharge planning if the patient needs post-hospital services based on physician/advanced practitioner order or complex needs related to functional status, cognitive ability, or social support system  Outcome: Progressing     Problem: Knowledge Deficit  Goal: Patient/family/caregiver demonstrates understanding of disease process, treatment plan, medications, and discharge instructions  Description: Complete learning assessment and assess knowledge base.  Interventions:  - Provide teaching at level of understanding  - Provide teaching via preferred learning methods  Outcome: Progressing     Problem: GASTROINTESTINAL - ADULT  Goal: Minimal or absence of nausea and/or vomiting  Description: INTERVENTIONS:  - Administer IV fluids if ordered to ensure adequate hydration  - Maintain NPO status until nausea and vomiting are resolved  - Nasogastric tube if ordered  - Administer ordered antiemetic medications as needed  - Provide nonpharmacologic comfort measures as appropriate  - Advance diet as tolerated, if ordered  - Consider nutrition services referral to assist patient with adequate nutrition and appropriate food choices  Outcome: Progressing  Goal: Maintains or returns to baseline bowel function  Description: INTERVENTIONS:  - Assess bowel function  - Encourage oral fluids to ensure adequate hydration  - Administer IV fluids if ordered to ensure adequate hydration  - Administer ordered medications as  needed  - Encourage mobilization and activity  - Consider nutritional services referral to assist patient with adequate nutrition and appropriate food choices  Outcome: Progressing     Problem: GENITOURINARY - ADULT  Goal: Urinary catheter remains patent  Description: INTERVENTIONS:  - Assess patency of urinary catheter  - If patient has a chronic dela cruz, consider changing catheter if non-functioning  - Follow guidelines for intermittent irrigation of non-functioning urinary catheter  Outcome: Progressing     Problem: METABOLIC, FLUID AND ELECTROLYTES - ADULT  Goal: Electrolytes maintained within normal limits  Description: INTERVENTIONS:  - Monitor labs and assess patient for signs and symptoms of electrolyte imbalances  - Administer electrolyte replacement as ordered  - Monitor response to electrolyte replacements, including repeat lab results as appropriate  - Instruct patient on fluid and nutrition as appropriate  Outcome: Progressing  Goal: Fluid balance maintained  Description: INTERVENTIONS:  - Monitor labs   - Monitor I/O and WT  - Instruct patient on fluid and nutrition as appropriate  - Assess for signs & symptoms of volume excess or deficit  Outcome: Progressing  Goal: Glucose maintained within target range  Description: INTERVENTIONS:  - Monitor Blood Glucose as ordered  - Assess for signs and symptoms of hyperglycemia and hypoglycemia  - Administer ordered medications to maintain glucose within target range  - Assess nutritional intake and initiate nutrition service referral as needed  Outcome: Progressing     Problem: Prexisting or High Potential for Compromised Skin Integrity  Goal: Skin integrity is maintained or improved  Description: INTERVENTIONS:  - Identify patients at risk for skin breakdown  - Assess and monitor skin integrity  - Assess and monitor nutrition and hydration status  - Monitor labs   - Assess for incontinence   - Turn and reposition patient  - Assist with mobility/ambulation  -  Relieve pressure over bony prominences  - Avoid friction and shearing  - Provide appropriate hygiene as needed including keeping skin clean and dry  - Evaluate need for skin moisturizer/barrier cream  - Collaborate with interdisciplinary team   - Patient/family teaching  - Consider wound care consult   Outcome: Progressing     Problem: Nutrition/Hydration-ADULT  Goal: Nutrient/Hydration intake appropriate for improving, restoring or maintaining nutritional needs  Description: Monitor and assess patient's nutrition/hydration status for malnutrition. Collaborate with interdisciplinary team and initiate plan and interventions as ordered.  Monitor patient's weight and dietary intake as ordered or per policy. Utilize nutrition screening tool and intervene as necessary. Determine patient's food preferences and provide high-protein, high-caloric foods as appropriate.     INTERVENTIONS:  - Monitor oral intake, urinary output, labs, and treatment plans  - Assess nutrition and hydration status and recommend course of action  - Evaluate amount of meals eaten  - Assist patient with eating if necessary   - Allow adequate time for meals  - Recommend/ encourage appropriate diets, oral nutritional supplements, and vitamin/mineral supplements  - Order, calculate, and assess calorie counts as needed  - Recommend, monitor, and adjust tube feedings and TPN/PPN based on assessed needs  - Assess need for intravenous fluids  - Provide specific nutrition/hydration education as appropriate  - Include patient/family/caregiver in decisions related to nutrition  Outcome: Progressing

## 2025-02-24 NOTE — CASE MANAGEMENT
Case Management Progress Note    Patient name Mathew Rico  Location [facility] 2 /South 2 M* MRN [id number]  : 1950 Date 2025       LOS (days): 14  Geometric Mean LOS (GMLOS) (days): 4.8  Days to GMLOS:-8.6        OBJECTIVE:        Current admission status: Inpatient  Preferred Pharmacy:   [id number] [place] [place] [facility] [address]  [address] [address]  Phone: [phone] Fax: [phone]    [place], [place] -  [address]  [facility] [address]  Phone: [phone] Fax: [phone]    [facility] - [address]  [facility],  [address]  [facility] [facility],  First [address] [facility] [address]  Phone: [phone] Fax: [phone]     [facility] [place], [place] - [address] [address]  Phone: [phone] Fax: [phone]    Primary Care Provider: Carolina Kumari MD    Primary Insurance: [facility]  Secondary Insurance:     PROGRESS NOTE: CM call leaving a  re: [place] OR determination for SNF clearance.  Awaiting call back.  DC Plan remains [facility] [place] if cleared.

## 2025-02-24 NOTE — ASSESSMENT & PLAN NOTE
Continue sliding scale during hospitalization    Results from last 7 days   Lab Units 02/24/25  1145 02/24/25  0759 02/23/25  2126 02/23/25  1608 02/23/25  1104   POC GLUCOSE mg/dl 225* 159* 205* 182* 187*

## 2025-02-24 NOTE — PLAN OF CARE
Problem: Potential for Falls  Goal: Patient will remain free of falls  Description: INTERVENTIONS:  - Educate patient/family on patient safety including physical limitations  - Instruct patient to call for assistance with activity   - Consult OT/PT to assist with strengthening/mobility   - Keep Call bell within reach  - Keep bed low and locked with side rails adjusted as appropriate  - Keep care items and personal belongings within reach  - Initiate and maintain comfort rounds  - Make Fall Risk Sign visible to staff  - Offer Toileting every 2 Hours, in advance of need  - Initiate/Maintain bed alarm  - Apply yellow socks and bracelet for high fall risk patients  - Consider moving patient to room near nurses station  Outcome: Progressing     Problem: PAIN - ADULT  Goal: Verbalizes/displays adequate comfort level or baseline comfort level  Description: Interventions:  - Encourage patient to monitor pain and request assistance  - Assess pain using appropriate pain scale  - Administer analgesics based on type and severity of pain and evaluate response  - Implement non-pharmacological measures as appropriate and evaluate response  - Consider cultural and social influences on pain and pain management  - Notify physician/advanced practitioner if interventions unsuccessful or patient reports new pain  Outcome: Progressing     Problem: INFECTION - ADULT  Goal: Absence or prevention of progression during hospitalization  Description: INTERVENTIONS:  - Assess and monitor for signs and symptoms of infection  - Monitor lab/diagnostic results  - Monitor all insertion sites, i.e. indwelling lines, tubes, and drains  - Monitor endotracheal if appropriate and nasal secretions for changes in amount and color  - [place] appropriate cooling/warming therapies per order  - Administer medications as ordered  - Instruct and encourage patient and family to use good hand hygiene technique  - Identify and instruct in appropriate isolation  precautions for identified infection/condition  Outcome: Progressing     Problem: DISCHARGE PLANNING  Goal: Discharge to home or other facility with appropriate resources  Description: INTERVENTIONS:  - Identify barriers to discharge w/patient and caregiver  - Arrange for needed discharge resources and transportation as appropriate  - Identify discharge learning needs (meds, wound care, etc.)  - Arrange for interpretive services to assist at discharge as needed  - Refer to Case Management Department for coordinating discharge planning if the patient needs post-hospital services based on physician/advanced practitioner order or complex needs related to functional status, cognitive ability, or social support system  Outcome: Progressing     Problem: Knowledge Deficit  Goal: Patient/family/caregiver demonstrates understanding of disease process, treatment plan, medications, and discharge instructions  Description: Complete learning assessment and assess knowledge base.  Interventions:  - Provide teaching at level of understanding  - Provide teaching via preferred learning methods  Outcome: Progressing     Problem: GASTROINTESTINAL - ADULT  Goal: Minimal or absence of nausea and/or vomiting  Description: INTERVENTIONS:  - Administer IV fluids if ordered to ensure adequate hydration  - Maintain NPO status until nausea and vomiting are resolved  - Nasogastric tube if ordered  - Administer ordered antiemetic medications as needed  - Provide nonpharmacologic comfort measures as appropriate  - Advance diet as tolerated, if ordered  - Consider nutrition services referral to assist patient with adequate nutrition and appropriate food choices  Outcome: Progressing  Goal: Maintains or returns to baseline bowel function  Description: INTERVENTIONS:  - Assess bowel function  - Encourage oral fluids to ensure adequate hydration  - Administer IV fluids if ordered to ensure adequate hydration  - Administer ordered medications as  needed  - Encourage mobilization and activity  - Consider nutritional services referral to assist patient with adequate nutrition and appropriate food choices  Outcome: Progressing     Problem: GENITOURINARY - ADULT  Goal: Urinary catheter remains patent  Description: INTERVENTIONS:  - Assess patency of urinary catheter  - If patient has a chronic dela cruz, consider changing catheter if non-functioning  - Follow guidelines for intermittent irrigation of non-functioning urinary catheter  Outcome: Progressing     Problem: METABOLIC, FLUID AND ELECTROLYTES - ADULT  Goal: Electrolytes maintained within normal limits  Description: INTERVENTIONS:  - Monitor labs and assess patient for signs and symptoms of electrolyte imbalances  - Administer electrolyte replacement as ordered  - Monitor response to electrolyte replacements, including repeat lab results as appropriate  - Instruct patient on fluid and nutrition as appropriate  Outcome: Progressing  Goal: Fluid balance maintained  Description: INTERVENTIONS:  - Monitor labs   - Monitor I/O and WT  - Instruct patient on fluid and nutrition as appropriate  - Assess for signs & symptoms of volume excess or deficit  Outcome: Progressing  Goal: Glucose maintained within target range  Description: INTERVENTIONS:  - Monitor Blood Glucose as ordered  - Assess for signs and symptoms of hyperglycemia and hypoglycemia  - Administer ordered medications to maintain glucose within target range  - Assess nutritional intake and initiate nutrition service referral as needed  Outcome: Progressing     Problem: Prexisting or High Potential for Compromised Skin Integrity  Goal: Skin integrity is maintained or improved  Description: INTERVENTIONS:  - Identify patients at risk for skin breakdown  - Assess and monitor skin integrity  - Assess and monitor nutrition and hydration status  - Monitor labs   - Assess for incontinence   - Turn and reposition patient  - Assist with mobility/ambulation  -  Relieve pressure over bony prominences  - Avoid friction and shearing  - Provide appropriate hygiene as needed including keeping skin clean and dry  - Evaluate need for skin moisturizer/barrier cream  - Collaborate with interdisciplinary team   - Patient/family teaching  - Consider wound care consult   Outcome: Progressing     Problem: Nutrition/Hydration-ADULT  Goal: Nutrient/Hydration intake appropriate for improving, restoring or maintaining nutritional needs  Description: Monitor and assess patient's nutrition/hydration status for malnutrition. Collaborate with interdisciplinary team and initiate plan and interventions as ordered.  Monitor patient's weight and dietary intake as ordered or per policy. Utilize nutrition screening tool and intervene as necessary. Determine patient's food preferences and provide high-protein, high-caloric foods as appropriate.     INTERVENTIONS:  - Monitor oral intake, urinary output, labs, and treatment plans  - Assess nutrition and hydration status and recommend course of action  - Evaluate amount of meals eaten  - Assist patient with eating if necessary   - Allow adequate time for meals  - Recommend/ encourage appropriate diets, oral nutritional supplements, and vitamin/mineral supplements  - Order, calculate, and assess calorie counts as needed  - Recommend, monitor, and adjust tube feedings and TPN/PPN based on assessed needs  - Assess need for intravenous fluids  - Provide specific nutrition/hydration education as appropriate  - Include patient/family/caregiver in decisions related to nutrition  Outcome: Progressing

## 2025-02-25 VITALS
RESPIRATION RATE: 18 BRPM | TEMPERATURE: 97.9 F | SYSTOLIC BLOOD PRESSURE: 112 MMHG | DIASTOLIC BLOOD PRESSURE: 53 MMHG | HEIGHT: 64 IN | BODY MASS INDEX: 24.13 KG/M2 | OXYGEN SATURATION: 96 % | HEART RATE: 60 BPM | WEIGHT: 141.31 LBS

## 2025-02-25 LAB — GLUCOSE SERPL-MCNC: 150 MG/DL (ref 65–140)

## 2025-02-25 PROCEDURE — 99239 HOSP IP/OBS DSCHRG MGMT >30: CPT | Performed by: INTERNAL MEDICINE

## 2025-02-25 PROCEDURE — 82948 REAGENT STRIP/BLOOD GLUCOSE: CPT

## 2025-02-25 RX ORDER — AMLODIPINE BESYLATE 10 MG/1
10 TABLET ORAL DAILY
Start: 2025-02-26

## 2025-02-25 RX ADMIN — PROPRANOLOL HYDROCHLORIDE 60 MG: 60 CAPSULE, EXTENDED RELEASE ORAL at 07:36

## 2025-02-25 RX ADMIN — CLOPIDOGREL BISULFATE 75 MG: 75 TABLET ORAL at 07:39

## 2025-02-25 RX ADMIN — AMLODIPINE BESYLATE 10 MG: 10 TABLET ORAL at 07:39

## 2025-02-25 RX ADMIN — BACLOFEN 5 MG: 10 TABLET ORAL at 07:39

## 2025-02-25 RX ADMIN — INSULIN LISPRO 1 UNITS: 100 INJECTION, SOLUTION INTRAVENOUS; SUBCUTANEOUS at 07:33

## 2025-02-25 RX ADMIN — ENOXAPARIN SODIUM 40 MG: 40 INJECTION SUBCUTANEOUS at 07:38

## 2025-02-25 RX ADMIN — POLYETHYLENE GLYCOL 3350 17 G: 17 POWDER, FOR SOLUTION ORAL at 07:36

## 2025-02-25 RX ADMIN — METFORMIN HYDROCHLORIDE 500 MG: 500 TABLET ORAL at 08:25

## 2025-02-25 RX ADMIN — BUDESONIDE AND FORMOTEROL FUMARATE DIHYDRATE 2 PUFF: 160; 4.5 AEROSOL RESPIRATORY (INHALATION) at 07:35

## 2025-02-25 RX ADMIN — GABAPENTIN 300 MG: 300 CAPSULE ORAL at 07:39

## 2025-02-25 RX ADMIN — Medication 500 MCG: at 07:39

## 2025-02-25 RX ADMIN — NYSTATIN: 100000 POWDER TOPICAL at 07:35

## 2025-02-25 RX ADMIN — PANTOPRAZOLE SODIUM 40 MG: 40 TABLET, DELAYED RELEASE ORAL at 05:43

## 2025-02-25 NOTE — PLAN OF CARE
Problem: Potential for Falls  Goal: Patient will remain free of falls  Description: INTERVENTIONS:  - Educate patient/family on patient safety including physical limitations  - Instruct patient to call for assistance with activity   - Consult OT/PT to assist with strengthening/mobility   - Keep Call bell within reach  - Keep bed low and locked with side rails adjusted as appropriate  - Keep care items and personal belongings within reach  - Initiate and maintain comfort rounds  - Make Fall Risk Sign visible to staff  - Offer Toileting every 2 Hours, in advance of need  - Initiate/Maintain bed alarm  - Apply yellow socks and bracelet for high fall risk patients  - Consider moving patient to room near nurses station  Outcome: Progressing     Problem: PAIN - ADULT  Goal: Verbalizes/displays adequate comfort level or baseline comfort level  Description: Interventions:  - Encourage patient to monitor pain and request assistance  - Assess pain using appropriate pain scale  - Administer analgesics based on type and severity of pain and evaluate response  - Implement non-pharmacological measures as appropriate and evaluate response  - Consider cultural and social influences on pain and pain management  - Notify physician/advanced practitioner if interventions unsuccessful or patient reports new pain  Outcome: Progressing     Problem: INFECTION - ADULT  Goal: Absence or prevention of progression during hospitalization  Description: INTERVENTIONS:  - Assess and monitor for signs and symptoms of infection  - Monitor lab/diagnostic results  - Monitor all insertion sites, i.e. indwelling lines, tubes, and drains  - Monitor endotracheal if appropriate and nasal secretions for changes in amount and color  - [place] appropriate cooling/warming therapies per order  - Administer medications as ordered  - Instruct and encourage patient and family to use good hand hygiene technique  - Identify and instruct in appropriate isolation  precautions for identified infection/condition  Outcome: Progressing     Problem: DISCHARGE PLANNING  Goal: Discharge to home or other facility with appropriate resources  Description: INTERVENTIONS:  - Identify barriers to discharge w/patient and caregiver  - Arrange for needed discharge resources and transportation as appropriate  - Identify discharge learning needs (meds, wound care, etc.)  - Arrange for interpretive services to assist at discharge as needed  - Refer to Case Management Department for coordinating discharge planning if the patient needs post-hospital services based on physician/advanced practitioner order or complex needs related to functional status, cognitive ability, or social support system  Outcome: Progressing     Problem: Knowledge Deficit  Goal: Patient/family/caregiver demonstrates understanding of disease process, treatment plan, medications, and discharge instructions  Description: Complete learning assessment and assess knowledge base.  Interventions:  - Provide teaching at level of understanding  - Provide teaching via preferred learning methods  Outcome: Progressing     Problem: GASTROINTESTINAL - ADULT  Goal: Minimal or absence of nausea and/or vomiting  Description: INTERVENTIONS:  - Administer IV fluids if ordered to ensure adequate hydration  - Maintain NPO status until nausea and vomiting are resolved  - Nasogastric tube if ordered  - Administer ordered antiemetic medications as needed  - Provide nonpharmacologic comfort measures as appropriate  - Advance diet as tolerated, if ordered  - Consider nutrition services referral to assist patient with adequate nutrition and appropriate food choices  Outcome: Progressing  Goal: Maintains or returns to baseline bowel function  Description: INTERVENTIONS:  - Assess bowel function  - Encourage oral fluids to ensure adequate hydration  - Administer IV fluids if ordered to ensure adequate hydration  - Administer ordered medications as  needed  - Encourage mobilization and activity  - Consider nutritional services referral to assist patient with adequate nutrition and appropriate food choices  Outcome: Progressing     Problem: GENITOURINARY - ADULT  Goal: Urinary catheter remains patent  Description: INTERVENTIONS:  - Assess patency of urinary catheter  - If patient has a chronic dela cruz, consider changing catheter if non-functioning  - Follow guidelines for intermittent irrigation of non-functioning urinary catheter  Outcome: Progressing     Problem: METABOLIC, FLUID AND ELECTROLYTES - ADULT  Goal: Electrolytes maintained within normal limits  Description: INTERVENTIONS:  - Monitor labs and assess patient for signs and symptoms of electrolyte imbalances  - Administer electrolyte replacement as ordered  - Monitor response to electrolyte replacements, including repeat lab results as appropriate  - Instruct patient on fluid and nutrition as appropriate  Outcome: Progressing  Goal: Fluid balance maintained  Description: INTERVENTIONS:  - Monitor labs   - Monitor I/O and WT  - Instruct patient on fluid and nutrition as appropriate  - Assess for signs & symptoms of volume excess or deficit  Outcome: Progressing  Goal: Glucose maintained within target range  Description: INTERVENTIONS:  - Monitor Blood Glucose as ordered  - Assess for signs and symptoms of hyperglycemia and hypoglycemia  - Administer ordered medications to maintain glucose within target range  - Assess nutritional intake and initiate nutrition service referral as needed  Outcome: Progressing     Problem: Prexisting or High Potential for Compromised Skin Integrity  Goal: Skin integrity is maintained or improved  Description: INTERVENTIONS:  - Identify patients at risk for skin breakdown  - Assess and monitor skin integrity  - Assess and monitor nutrition and hydration status  - Monitor labs   - Assess for incontinence   - Turn and reposition patient  - Assist with mobility/ambulation  -  Relieve pressure over bony prominences  - Avoid friction and shearing  - Provide appropriate hygiene as needed including keeping skin clean and dry  - Evaluate need for skin moisturizer/barrier cream  - Collaborate with interdisciplinary team   - Patient/family teaching  - Consider wound care consult   Outcome: Progressing     Problem: Nutrition/Hydration-ADULT  Goal: Nutrient/Hydration intake appropriate for improving, restoring or maintaining nutritional needs  Description: Monitor and assess patient's nutrition/hydration status for malnutrition. Collaborate with interdisciplinary team and initiate plan and interventions as ordered.  Monitor patient's weight and dietary intake as ordered or per policy. Utilize nutrition screening tool and intervene as necessary. Determine patient's food preferences and provide high-protein, high-caloric foods as appropriate.     INTERVENTIONS:  - Monitor oral intake, urinary output, labs, and treatment plans  - Assess nutrition and hydration status and recommend course of action  - Evaluate amount of meals eaten  - Assist patient with eating if necessary   - Allow adequate time for meals  - Recommend/ encourage appropriate diets, oral nutritional supplements, and vitamin/mineral supplements  - Order, calculate, and assess calorie counts as needed  - Recommend, monitor, and adjust tube feedings and TPN/PPN based on assessed needs  - Assess need for intravenous fluids  - Provide specific nutrition/hydration education as appropriate  - Include patient/family/caregiver in decisions related to nutrition  Outcome: Progressing

## 2025-02-25 NOTE — UTILIZATION REVIEW
NOTIFICATION OF ADMISSION DISCHARGE   This is a Notification of Discharge from [facility]. Please be advised that this patient has been discharge from our facility. Below you will find the admission and discharge date and time including the patient’s disposition.   UTILIZATION REVIEW CONTACT:  Marsha Garcia  Utilization   Network Utilization Review Department  Phone: [phone] x carefully listen to the prompts. All voicemails are confidential.  Email: [email or web address]     ADMISSION INFORMATION  PRESENTATION DATE: 2/10/2025  6:34 PM  OBERVATION ADMISSION DATE: N/A  INPATIENT ADMISSION DATE: 2/10/25 11:37 PM   DISCHARGE DATE: 2/25/2025 12:06 PM   DISPOSITION:Non [facility] SNF/TCU/SNU    Network Utilization Review Department  ATTENTION: Please call with any questions or concerns to [phone] and carefully listen to the prompts so that you are directed to the right person. All voicemails are confidential.   For Discharge needs, contact Care Management DC Support Team at [phone] opt. 2  Send all requests for admission clinical reviews, approved or denied determinations and any other requests to dedicated fax number below belonging to the campus where the patient is receiving treatment. List of dedicated fax numbers for the Facilities:  FACILITY NAME UR FAX NUMBER   ADMISSION DENIALS (Administrative/Medical Necessity) [phone]   DISCHARGE SUPPORT TEAM ([facility]) [phone]   PARENT CHILD HEALTH (Maternity/NICU/Pediatrics) [phone]   [facility] [phone]   [facility] [phone]   Count includes the [facility] [phone]   [facility] [phone]   [facility] [phone]   [facility] [phone]   [facility] [phone]   [facility]  [phone]   [facility] [phone]   [facility] [phone]   [facility] [phone]   [facility] [phone]

## 2025-02-25 NOTE — TELEPHONE ENCOUNTER
Patient remains admitted at this time.     Accepting Facility Name, City & State : [facility]  Receiving Facility/Agency Phone Number: [phone]  Facility/Agency Fax Number: [phone]

## 2025-02-25 NOTE — PLAN OF CARE
Problem: Potential for Falls  Goal: Patient will remain free of falls  Description: INTERVENTIONS:  - Educate patient/family on patient safety including physical limitations  - Instruct patient to call for assistance with activity   - Consult OT/PT to assist with strengthening/mobility   - Keep Call bell within reach  - Keep bed low and locked with side rails adjusted as appropriate  - Keep care items and personal belongings within reach  - Initiate and maintain comfort rounds  - Make Fall Risk Sign visible to staff  - Offer Toileting every 2 Hours, in advance of need  - Initiate/Maintain bed alarm  - Apply yellow socks and bracelet for high fall risk patients  - Consider moving patient to room near nurses station  Outcome: Progressing     Problem: PAIN - ADULT  Goal: Verbalizes/displays adequate comfort level or baseline comfort level  Description: Interventions:  - Encourage patient to monitor pain and request assistance  - Assess pain using appropriate pain scale  - Administer analgesics based on type and severity of pain and evaluate response  - Implement non-pharmacological measures as appropriate and evaluate response  - Consider cultural and social influences on pain and pain management  - Notify physician/advanced practitioner if interventions unsuccessful or patient reports new pain  Outcome: Progressing     Problem: INFECTION - ADULT  Goal: Absence or prevention of progression during hospitalization  Description: INTERVENTIONS:  - Assess and monitor for signs and symptoms of infection  - Monitor lab/diagnostic results  - Monitor all insertion sites, i.e. indwelling lines, tubes, and drains  - Monitor endotracheal if appropriate and nasal secretions for changes in amount and color  - [place] appropriate cooling/warming therapies per order  - Administer medications as ordered  - Instruct and encourage patient and family to use good hand hygiene technique  - Identify and instruct in appropriate isolation  precautions for identified infection/condition  Outcome: Progressing     Problem: DISCHARGE PLANNING  Goal: Discharge to home or other facility with appropriate resources  Description: INTERVENTIONS:  - Identify barriers to discharge w/patient and caregiver  - Arrange for needed discharge resources and transportation as appropriate  - Identify discharge learning needs (meds, wound care, etc.)  - Arrange for interpretive services to assist at discharge as needed  - Refer to Case Management Department for coordinating discharge planning if the patient needs post-hospital services based on physician/advanced practitioner order or complex needs related to functional status, cognitive ability, or social support system  Outcome: Progressing     Problem: Knowledge Deficit  Goal: Patient/family/caregiver demonstrates understanding of disease process, treatment plan, medications, and discharge instructions  Description: Complete learning assessment and assess knowledge base.  Interventions:  - Provide teaching at level of understanding  - Provide teaching via preferred learning methods  Outcome: Progressing     Problem: GASTROINTESTINAL - ADULT  Goal: Minimal or absence of nausea and/or vomiting  Description: INTERVENTIONS:  - Administer IV fluids if ordered to ensure adequate hydration  - Maintain NPO status until nausea and vomiting are resolved  - Nasogastric tube if ordered  - Administer ordered antiemetic medications as needed  - Provide nonpharmacologic comfort measures as appropriate  - Advance diet as tolerated, if ordered  - Consider nutrition services referral to assist patient with adequate nutrition and appropriate food choices  Outcome: Progressing  Goal: Maintains or returns to baseline bowel function  Description: INTERVENTIONS:  - Assess bowel function  - Encourage oral fluids to ensure adequate hydration  - Administer IV fluids if ordered to ensure adequate hydration  - Administer ordered medications as  needed  - Encourage mobilization and activity  - Consider nutritional services referral to assist patient with adequate nutrition and appropriate food choices  Outcome: Progressing     Problem: GENITOURINARY - ADULT  Goal: Urinary catheter remains patent  Description: INTERVENTIONS:  - Assess patency of urinary catheter  - If patient has a chronic dela cruz, consider changing catheter if non-functioning  - Follow guidelines for intermittent irrigation of non-functioning urinary catheter  Outcome: Progressing     Problem: METABOLIC, FLUID AND ELECTROLYTES - ADULT  Goal: Electrolytes maintained within normal limits  Description: INTERVENTIONS:  - Monitor labs and assess patient for signs and symptoms of electrolyte imbalances  - Administer electrolyte replacement as ordered  - Monitor response to electrolyte replacements, including repeat lab results as appropriate  - Instruct patient on fluid and nutrition as appropriate  Outcome: Progressing  Goal: Fluid balance maintained  Description: INTERVENTIONS:  - Monitor labs   - Monitor I/O and WT  - Instruct patient on fluid and nutrition as appropriate  - Assess for signs & symptoms of volume excess or deficit  Outcome: Progressing  Goal: Glucose maintained within target range  Description: INTERVENTIONS:  - Monitor Blood Glucose as ordered  - Assess for signs and symptoms of hyperglycemia and hypoglycemia  - Administer ordered medications to maintain glucose within target range  - Assess nutritional intake and initiate nutrition service referral as needed  Outcome: Progressing     Problem: Prexisting or High Potential for Compromised Skin Integrity  Goal: Skin integrity is maintained or improved  Description: INTERVENTIONS:  - Identify patients at risk for skin breakdown  - Assess and monitor skin integrity  - Assess and monitor nutrition and hydration status  - Monitor labs   - Assess for incontinence   - Turn and reposition patient  - Assist with mobility/ambulation  -  Relieve pressure over bony prominences  - Avoid friction and shearing  - Provide appropriate hygiene as needed including keeping skin clean and dry  - Evaluate need for skin moisturizer/barrier cream  - Collaborate with interdisciplinary team   - Patient/family teaching  - Consider wound care consult   Outcome: Progressing     Problem: Nutrition/Hydration-ADULT  Goal: Nutrient/Hydration intake appropriate for improving, restoring or maintaining nutritional needs  Description: Monitor and assess patient's nutrition/hydration status for malnutrition. Collaborate with interdisciplinary team and initiate plan and interventions as ordered.  Monitor patient's weight and dietary intake as ordered or per policy. Utilize nutrition screening tool and intervene as necessary. Determine patient's food preferences and provide high-protein, high-caloric foods as appropriate.     INTERVENTIONS:  - Monitor oral intake, urinary output, labs, and treatment plans  - Assess nutrition and hydration status and recommend course of action  - Evaluate amount of meals eaten  - Assist patient with eating if necessary   - Allow adequate time for meals  - Recommend/ encourage appropriate diets, oral nutritional supplements, and vitamin/mineral supplements  - Order, calculate, and assess calorie counts as needed  - Recommend, monitor, and adjust tube feedings and TPN/PPN based on assessed needs  - Assess need for intravenous fluids  - Provide specific nutrition/hydration education as appropriate  - Include patient/family/caregiver in decisions related to nutrition  Outcome: Progressing

## 2025-02-25 NOTE — CASE MANAGEMENT
Case Management Progress Note    Patient name Mathew Rico  Location South 2 /South 2 M* MRN [id number]  : 1950 Date 2025       LOS (days): 15  Geometric Mean LOS (GMLOS) (days): 4.8  Days to GMLOS:-9.5        OBJECTIVE:        Current admission status: Inpatient  Preferred Pharmacy:   [id number] [place] - [address] [facility]  [address] [facility] [address]  Phone: [phone] Fax: [phone]    [place], [place] -  [address]   [address]  [facility] [address]  Phone: [phone] Fax: [phone]    [facility] [place] - [address],  [address],  First Floor [facility] [address]  Phone: [phone] Fax: [phone]     [facility] [place] - [address] [address]  Phone: [phone] Fax: [phone]    Primary Care Provider: Carolina Kumari MD    Primary Insurance: [facility]  Secondary Insurance:     PROGRESS NOTE: [place] [facility] clearance received this day.  Awaiting bed availability at [facility] to coordinate dc timing.

## 2025-02-25 NOTE — CASE MANAGEMENT
Case Management Discharge Planning Note    Patient name Mathew Rico  Location [place] [address] /South 2 M* MRN [id number]  : 1950 Date 2025       Current Admission Date: 2/10/2025  Current Admission Diagnosis:Severe sepsis (HCC)   Patient Active Problem List    Diagnosis Date Noted Date Diagnosed    Nausea and vomiting 2025     Mild protein-calorie malnutrition (HCC) 2025     Urinary obstruction 2025     History of stroke 2024     Bladder wall thickening 2024     Pain of left hip 2024     Left leg pain 2024     Dizziness 2024     Pruritus 2024     Asymptomatic bacteriuria 2024     Blurred vision, bilateral 2024     Symptoms of upper respiratory infection (URI) 06/15/2024     Chest pain 2024     Acute encephalopathy 2024     [place] on CPAP 2024     Fall 2024     Primary hypertension 2024     Type 2 diabetes mellitus without complication, without long-term current use of insulin (HCC) 2024     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HCC) 2024     Multiple sclerosis (HCC) 2024     Urinary retention 2024     Neck pain 2024     Vitamin B deficiency 2024     Generalized weakness 2024     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     UTI (urinary tract infection) 02/15/2024     Fall 2023     Weakness 2023     Accidental fall from chair 2023     Constipation 2023     Chronic diastolic congestive heart failure (HCC) 2023     Hyponatremia 2023     Excessive daytime sleepiness 2022     Shortness of breath 2022     Severe sepsis (HCC) 2021     Pancreatic mass 2020     Pancreatic lesion 2020     Bladder stones 2019     Bladder neck contracture 2019     History of CVA (cerebrovascular accident) 10/21/2018     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HCC) 2017     Diabetic neuropathy (HCC) 2016     Neurogenic bladder 2016      Thyroid nodule 09/02/2015     Cervical spinal stenosis 12/11/2014     Generalized anxiety disorder 07/13/2014     Obstructive sleep apnea 01/04/2013     Benign colon polyp 06/19/2012     Esophageal reflux 06/19/2012     Fatty liver 06/19/2012     Glaucoma 06/19/2012     Hyperlipidemia 06/19/2012     Multiple sclerosis (HCC) 06/19/2012     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) 06/19/2012     Primary hypertension 06/11/2012       LOS (days): 15  Geometric Mean LOS (GMLOS) (days): 4.8  Days to GMLOS:-9.6     OBJECTIVE:  Risk of Unplanned Readmission Score: 64.65         Current admission status: Inpatient   Preferred Pharmacy:   [facility][id number] [facility] [place] - [address] [facility]  [address] [facility] [address]  Phone: [phone] Fax: [phone]    [place], [place] - [address]  [facility] [address]  Phone: [phone] Fax: [phone]    [facility] [place] - [address]  [facility],  [address]  [facility],  First Floor [facility] [address]  Phone: [phone] Fax: [phone]     PH[place]ACY [place] - [address] [place] [address]  Phone: [phone] Fax: [phone]    Primary Care Provider: Carolina Kumari MD    Primary Insurance: [facility]  Secondary Insurance:     DISCHARGE DETAILS:                           Contacts  Patient Contacts: Guzman Rico  Relationship to Patient:: Family  Contact Method: Phone  Phone Number: [phone]  Reason/Outcome: Discharge Planning                        Treatment Team Recommendation: Short Term Rehab  Discharge Destination Plan:: Short Term Rehab  Transport at Discharge : Misti flores     Number/Name of Dispatcher: Roundtrip  Transported by (Company and Unit #): [employer]  ETA of Transport (Date): 02/25/25  ETA of Transport (Time): 1130              IMM Given (Date):: 02/25/25  IMM Given to:: Family (Brother Guzman  agreeable to d/c)          Accepting Facility Name, City & State : [facility]  Receiving Facility/Agency Phone Number: [phone]  Facility/Agency Fax Number: [phone]

## 2025-02-25 NOTE — DISCHARGE SUMMARY
Discharge Summary - Mathew Rico, 1950, [id number]        Admission Date: 2/10/2025  Discharge Date: 2/25/2025      Discharge Diagnosis:   1.  Urinary tract infection, multiply resistant Enterococcus  2.  Severe sepsis secondary to #1  3.  Neurogenic bladder, status post suprapubic tube  4.  Multiple sclerosis  5.  Type 2 diabetes  6.  Hypertension  7.  History of stroke    Consulting Physicians:  None    Procedures Performed:   Suprapubic catheter exchange    HPI: The patient is a 74-year-old man with a history of multiple sclerosis and resultant neurogenic bladder.  He has a chronic suprapubic tube.  He came to the emergency room with severe lower abdominal pain.  He was found to have pyuria, bacteriuria, and positive nitrites in his urine.  He was admitted for further care of urinary tract infection with sepsis.  The patient has a history of multiply resistant organisms.  His suprapubic catheter was changed in the emergency room.    Hospital Course: The patient was admitted to the hospital and resuscitated with intravenous fluid.  Cultures were obtained.  Because of his history of multiply resistant organisms, he was started on ertapenem.  Ultimately, the patient's urine culture grew Enterococcus which was indeed multiply resistant.  It was sensitive to ertapenem and he had a full course of antibiotics during his hospital stay.    The patient has a suprapubic tube because of neurogenic bladder.  This was exchanged in the emergency room.  It was functioning well during his stay.  Obviously, the patient's urinary tract infection was related to the presence of suprapubic tube.    Patient has hypertension and type 2 diabetes.  These were well-controlled during his stay.    The patient has a history of stroke.  He remained asymptomatic in this regard.  He remains on aspirin and atorvastatin.    The patient has a history of multiple sclerosis.  He does have significant disability related to this.  This was  stable during his stay.    At the time of discharge the patient was feeling pretty well.  Vital signs were stable.  Lungs were clear.  Cardiac exam revealed a regular rhythm.  The abdomen was soft and nontender.  Suprapubic was noted.  There was no edema.    Disposition: The patient was discharged to [facility] in [place].  Diet and activity will be as tolerated.  He will be under the care of the physicians at that facility during his stay there.  He was asked to contact his primary care physician, Blank Kumari, for follow-up within 1 week after his discharge home.    Discharge instructions/Information to patient and family:   See after visit summary for information provided to patient and family.      Provisions for Follow-Up Care:  See after visit summary for information related to follow-up care and any pertinent home health orders.      Planned Readmission: No    Discharge Statement   I spent 40 minutes discharging the patient. This time was spent on the day of discharge. I had direct contact with the patient on the day of discharge.     Discharge Medications:  See after visit summary for reconciled discharge medications provided to patient and family.

## 2025-02-26 ENCOUNTER — PATIENT OUTREACH (OUTPATIENT)
Dept: CASE MANAGEMENT | Facility: OTHER | Age: 75
End: 2025-02-26

## 2025-02-26 NOTE — PROGRESS NOTES
Outpatient Care Management Note:  Inbasket ADT alert received that patient was discharged from [facility] on 2/25/25.  Patient was admitted on 2/10/25 for severe sepsis 2/2 UTI.  Patient has a chronic indwelling suprapubic catheter in place due to neurogenic bladder.  Patient was cleared to discharge to [facility] for STR.     PMH:  MS, neurogenic bladder with chronic suprapubic catheter in place, DM 2 last A1C, Hx stroke, HTN    Admission or ED Risk Score:  93    HU/RU program closed at this time.  I have removed myself from care team.

## 2025-02-26 NOTE — TELEPHONE ENCOUNTER
Called Bette Gonzalez [facility] [phone], call transferred and on hold for 7 minutes.  Will try again later.

## 2025-02-26 NOTE — RESTORATIVE TECHNICIAN NOTE
Restorative Technician Note      Patient Name: Mathew Rico     Restorative Tech Visit Date: 02/26/25  Note Type: Mobility  Patient Position Upon Consult: Supine  Activity Performed: Range of motion; Dangled  Patient Position at End of Consult: All needs within reach; Supine

## 2025-02-27 NOTE — TELEPHONE ENCOUNTER
Called Bette Quispe and spoke with Grace. Patient's appointment on 3/12/2025 at 830 am with Dr Jeffries at [facility] given to Grace.  Office address given to Grace.

## 2025-03-13 ENCOUNTER — PROCEDURE VISIT (OUTPATIENT)
Dept: UROLOGY | Facility: CLINIC | Age: 75
End: 2025-03-13
Payer: MEDICARE

## 2025-03-13 VITALS — OXYGEN SATURATION: 96 % | DIASTOLIC BLOOD PRESSURE: 60 MMHG | SYSTOLIC BLOOD PRESSURE: 130 MMHG | HEART RATE: 78 BPM

## 2025-03-13 DIAGNOSIS — N31.9 NEUROGENIC BLADDER: Primary | ICD-10-CM

## 2025-03-13 PROBLEM — A41.9 SEVERE SEPSIS (HCC): Status: RESOLVED | Noted: 2021-06-02 | Resolved: 2025-03-13

## 2025-03-13 PROBLEM — R65.20 SEVERE SEPSIS (HCC): Status: RESOLVED | Noted: 2021-06-02 | Resolved: 2025-03-13

## 2025-03-13 PROCEDURE — 51705 CHANGE OF BLADDER TUBE: CPT

## 2025-03-13 NOTE — PROGRESS NOTES
3/13/2025    Mathew Rico  1950  [id number]    Diagnosis  Chief Complaint    Neurogenic Bladder         Patient presents for SPT change managed by our office    Plan  Follow up in 6 weeks for SPT change    Procedure: Suprapubic Tube Change       Cystostomy tube change     Date/Time  3/13/2025 9:00 AM     Performed by  Litzy Hurtado RN   Authorized by  Javy Jeffries MD     [place] Protocol   Consent: Verbal consent obtained.  Consent given by: patient     Procedure Details   Patient tolerance: patient tolerated the procedure well with no immediate complications               Current catheter removed without difficulty after deflation of an intact balloon. Site prepped with Hibiclens, new 20F  suprapubic spt change via aseptic technique without incident, 10 ml balloon inflated with sterile water. irrigated easily for clear return, mild spasm noted. Patient tolerated well. Attached to drainage bag.     Vitals:    03/13/25 0831   BP: 130/60   BP Location: Right arm   Patient Position: Supine   Cuff Size: Standard   Pulse: 78   SpO2: 96%         Litzy Hurtado RN

## 2025-03-20 PROBLEM — N39.0 UTI (URINARY TRACT INFECTION): Status: RESOLVED | Noted: 2024-02-15 | Resolved: 2025-03-20

## 2025-03-23 NOTE — UTILIZATION REVIEW
Discussed w pt will increase fluid intake and will take 10 meq kdur daily for 7 days   Notification of Inpatient Admission/Inpatient Authorization Request  This is a Notification of Inpatient Admission/Request for Inpatient Authorization for our facility [address] [place] [address]  Please be advised that this patient is currently in our facility under Inpatient Status  Below you will find the Attending Physician and Facilitys information including NPI#  and contact information for the Utilization Review Department where the patient is receiving care services  Place of Service Code: 24   Place of Service Name: [facility]  Presentation Date & Time: 10/21/2018  9:08 PM  Inpatient Admission Date & Time: 10/21/18 2210  Discharge Date & Time: No discharge date for patient encounter  Discharge Disposition (if discharged): Home/Self Care  Attending Physician: IVORY iJménez  Specialty- Hospitalist  Medicare Number-   Medicaid Number -   UPIN Number -   National Practioner ID- [id number]  Admission Orders:   Admission Orders     Ordered        10/21/18 2210  Inpatient Admission (expected length of stay for this patient is greater than two midnights)  Once             Facility: [address] [place] [address]  Address: [facility] [place], [address]  Phone: [phone] Tax ID: [id number]  NPI: [id number]  Medicare ID: [id number]    Thank you,  [address] [place] [address] Utilization Review Department  Phone: [phone]; Fax [phone]  ATTENTION: Please call with any questions or concerns to [phone]  and carefully follow the prompts so that you are directed to the right person  Send all requests for admission clinical reviews, approved or denied determinations and any other requests to fax [phone]   All voicemails are confidential

## 2025-04-24 ENCOUNTER — TELEPHONE (OUTPATIENT)
Age: 75
End: 2025-04-24

## 2025-04-24 NOTE — TELEPHONE ENCOUNTER
Called and spoke with [facility] who states they  cannot transport patient on a litter. Wheelchair only.  Patient rescheduled to 4/29/2025 at 1100 am at the [facility] office.

## 2025-04-24 NOTE — TELEPHONE ENCOUNTER
[facility] called to ask why the patient's SPT will not be changed during today's visit? The caller questioned if it was due to the mode of transport?     Message was sent to office chat but the caller hung up before I could transfer call to staff.

## 2025-04-29 ENCOUNTER — PROCEDURE VISIT (OUTPATIENT)
Dept: UROLOGY | Facility: CLINIC | Age: 75
End: 2025-04-29
Payer: MEDICARE

## 2025-04-29 DIAGNOSIS — N31.9 NEUROGENIC BLADDER: Primary | ICD-10-CM

## 2025-04-29 PROCEDURE — 51705 CHANGE OF BLADDER TUBE: CPT

## 2025-05-01 VITALS
RESPIRATION RATE: 20 BRPM | OXYGEN SATURATION: 96 % | SYSTOLIC BLOOD PRESSURE: 130 MMHG | DIASTOLIC BLOOD PRESSURE: 80 MMHG | HEART RATE: 100 BPM

## 2025-05-01 NOTE — PROGRESS NOTES
4/29/2025    Mathew Rico  1950  [id number]    Diagnosis  Chief Complaint    Neurogenic Bladder         Patient presents for SPT exchange managed by our office    Plan  Follow up in 6 weeks for SPT exchange    Procedure: Suprapubic Tube Change       Cystostomy tube change     Date/Time  4/29/2025 11:00 AM     Performed by  Litzy Hurtado RN   Authorized by  Alex Christy MD     [place] Protocol   Consent: Verbal consent obtained.  Consent given by: patient     Procedure Details   Patient tolerance: patient tolerated the procedure well with no immediate complications               Current catheter removed without difficulty after deflation of an intact balloon. Site prepped with Hibiclens, new 20F  suprapubic spt change via aseptic technique without incident, 10 ml balloon inflated with sterile water. irrigated easily for clear return, mild spasm noted. Patient tolerated well. Attached to nighttime drainage bag.     Vitals:    04/29/25 1122   BP: 130/80   BP Location: Right arm   Patient Position: Supine   Cuff Size: Adult   Pulse: 100   Resp: 20   SpO2: 96%         Litzy Hurtado RN

## 2025-05-12 ENCOUNTER — APPOINTMENT (EMERGENCY)
Dept: RADIOLOGY | Facility: HOSPITAL | Age: 75
DRG: 871 | End: 2025-05-12
Payer: MEDICARE

## 2025-05-12 ENCOUNTER — HOSPITAL ENCOUNTER (INPATIENT)
Facility: HOSPITAL | Age: 75
LOS: 9 days | Discharge: NURSING, REHAB OR LONG-TERM CARE | DRG: 871 | End: 2025-05-21
Attending: EMERGENCY MEDICINE
Payer: MEDICARE

## 2025-05-12 DIAGNOSIS — A41.9 SEVERE SEPSIS (HCC): Primary | ICD-10-CM

## 2025-05-12 DIAGNOSIS — G93.40 ACUTE ENCEPHALOPATHY: ICD-10-CM

## 2025-05-12 DIAGNOSIS — J18.9 PNEUMONIA: ICD-10-CM

## 2025-05-12 DIAGNOSIS — Z01.89 ENCOUNTER FOR REHABILITATION EVALUATION: ICD-10-CM

## 2025-05-12 DIAGNOSIS — N17.9 AKI (ACUTE KIDNEY INJURY) (HCC): ICD-10-CM

## 2025-05-12 DIAGNOSIS — N30.90 CYSTITIS: ICD-10-CM

## 2025-05-12 DIAGNOSIS — R65.20 SEVERE SEPSIS (HCC): Primary | ICD-10-CM

## 2025-05-12 PROBLEM — R11.2 NAUSEA AND VOMITING: Status: RESOLVED | Noted: 2025-02-16 | Resolved: 2025-05-12

## 2025-05-12 LAB
ALBUMIN SERPL BCG-MCNC: 3.4 G/DL (ref 3.5–5)
ALP SERPL-CCNC: 68 U/L (ref 34–104)
ALT SERPL W P-5'-P-CCNC: 8 U/L (ref 7–52)
ANION GAP SERPL CALCULATED.3IONS-SCNC: 12 MMOL/L (ref 4–13)
APTT PPP: 25 SECONDS (ref 23–34)
AST SERPL W P-5'-P-CCNC: 13 U/L (ref 13–39)
ATRIAL RATE: 98 BPM
BASOPHILS # BLD AUTO: 0.07 THOUSANDS/ÂΜL (ref 0–0.1)
BASOPHILS NFR BLD AUTO: 0 % (ref 0–1)
BILIRUB SERPL-MCNC: 0.36 MG/DL (ref 0.2–1)
BUN SERPL-MCNC: 35 MG/DL (ref 5–25)
CALCIUM ALBUM COR SERPL-MCNC: 9.6 MG/DL (ref 8.3–10.1)
CALCIUM SERPL-MCNC: 9.1 MG/DL (ref 8.4–10.2)
CHLORIDE SERPL-SCNC: 103 MMOL/L (ref 96–108)
CO2 SERPL-SCNC: 21 MMOL/L (ref 21–32)
CREAT SERPL-MCNC: 1.48 MG/DL (ref 0.6–1.3)
EOSINOPHIL # BLD AUTO: 0.28 THOUSAND/ÂΜL (ref 0–0.61)
EOSINOPHIL NFR BLD AUTO: 2 % (ref 0–6)
ERYTHROCYTE [DISTWIDTH] IN BLOOD BY AUTOMATED COUNT: 16.1 % (ref 11.6–15.1)
GFR SERPL CREATININE-BSD FRML MDRD: 45 ML/MIN/1.73SQ M
GLUCOSE SERPL-MCNC: 97 MG/DL (ref 65–140)
HCT VFR BLD AUTO: 38.6 % (ref 36.5–49.3)
HGB BLD-MCNC: 12.5 G/DL (ref 12–17)
IMM GRANULOCYTES # BLD AUTO: 0.14 THOUSAND/UL (ref 0–0.2)
IMM GRANULOCYTES NFR BLD AUTO: 1 % (ref 0–2)
INR PPP: 1.02 (ref 0.85–1.19)
LACTATE SERPL-SCNC: 2.1 MMOL/L (ref 0.5–2)
LACTATE SERPL-SCNC: 3.8 MMOL/L (ref 0.5–2)
LYMPHOCYTES # BLD AUTO: 2.05 THOUSANDS/ÂΜL (ref 0.6–4.47)
LYMPHOCYTES NFR BLD AUTO: 11 % (ref 14–44)
MCH RBC QN AUTO: 26.7 PG (ref 26.8–34.3)
MCHC RBC AUTO-ENTMCNC: 32.4 G/DL (ref 31.4–37.4)
MCV RBC AUTO: 83 FL (ref 82–98)
MONOCYTES # BLD AUTO: 1.36 THOUSAND/ÂΜL (ref 0.17–1.22)
MONOCYTES NFR BLD AUTO: 7 % (ref 4–12)
NEUTROPHILS # BLD AUTO: 14.78 THOUSANDS/ÂΜL (ref 1.85–7.62)
NEUTS SEG NFR BLD AUTO: 79 % (ref 43–75)
NRBC BLD AUTO-RTO: 0 /100 WBCS
P AXIS: 63 DEGREES
PLATELET # BLD AUTO: 370 THOUSANDS/UL (ref 149–390)
PMV BLD AUTO: 8 FL (ref 8.9–12.7)
POTASSIUM SERPL-SCNC: 4.3 MMOL/L (ref 3.5–5.3)
PR INTERVAL: 156 MS
PROCALCITONIN SERPL-MCNC: 0.15 NG/ML
PROT SERPL-MCNC: 7 G/DL (ref 6.4–8.4)
PROTHROMBIN TIME: 13.7 SECONDS (ref 12.3–15)
QRS AXIS: 40 DEGREES
QRSD INTERVAL: 90 MS
QT INTERVAL: 366 MS
QTC INTERVAL: 467 MS
RBC # BLD AUTO: 4.68 MILLION/UL (ref 3.88–5.62)
SODIUM SERPL-SCNC: 136 MMOL/L (ref 135–147)
T WAVE AXIS: 34 DEGREES
VENTRICULAR RATE: 98 BPM
WBC # BLD AUTO: 18.68 THOUSAND/UL (ref 4.31–10.16)

## 2025-05-12 PROCEDURE — 84145 PROCALCITONIN (PCT): CPT

## 2025-05-12 PROCEDURE — 87040 BLOOD CULTURE FOR BACTERIA: CPT

## 2025-05-12 PROCEDURE — 74177 CT ABD & PELVIS W/CONTRAST: CPT

## 2025-05-12 PROCEDURE — 83605 ASSAY OF LACTIC ACID: CPT

## 2025-05-12 PROCEDURE — 93010 ELECTROCARDIOGRAM REPORT: CPT | Performed by: INTERNAL MEDICINE

## 2025-05-12 PROCEDURE — 85025 COMPLETE CBC W/AUTO DIFF WBC: CPT

## 2025-05-12 PROCEDURE — 87147 CULTURE TYPE IMMUNOLOGIC: CPT

## 2025-05-12 PROCEDURE — 85730 THROMBOPLASTIN TIME PARTIAL: CPT

## 2025-05-12 PROCEDURE — 85610 PROTHROMBIN TIME: CPT

## 2025-05-12 PROCEDURE — 93005 ELECTROCARDIOGRAM TRACING: CPT

## 2025-05-12 PROCEDURE — 80053 COMPREHEN METABOLIC PANEL: CPT

## 2025-05-12 PROCEDURE — 99223 1ST HOSP IP/OBS HIGH 75: CPT

## 2025-05-12 PROCEDURE — 36415 COLL VENOUS BLD VENIPUNCTURE: CPT

## 2025-05-12 PROCEDURE — 99285 EMERGENCY DEPT VISIT HI MDM: CPT

## 2025-05-12 PROCEDURE — 96361 HYDRATE IV INFUSION ADD-ON: CPT

## 2025-05-12 PROCEDURE — 71260 CT THORAX DX C+: CPT

## 2025-05-12 PROCEDURE — 87186 SC STD MICRODIL/AGAR DIL: CPT

## 2025-05-12 PROCEDURE — 96365 THER/PROPH/DIAG IV INF INIT: CPT

## 2025-05-12 PROCEDURE — 87086 URINE CULTURE/COLONY COUNT: CPT

## 2025-05-12 RX ORDER — SODIUM CHLORIDE 9 MG/ML
100 INJECTION, SOLUTION INTRAVENOUS CONTINUOUS
Status: DISCONTINUED | OUTPATIENT
Start: 2025-05-12 | End: 2025-05-12

## 2025-05-12 RX ORDER — PANTOPRAZOLE SODIUM 40 MG/1
40 TABLET, DELAYED RELEASE ORAL DAILY
Status: DISCONTINUED | OUTPATIENT
Start: 2025-05-13 | End: 2025-05-21 | Stop reason: HOSPADM

## 2025-05-12 RX ORDER — BUDESONIDE AND FORMOTEROL FUMARATE DIHYDRATE 160; 4.5 UG/1; UG/1
2 AEROSOL RESPIRATORY (INHALATION) 2 TIMES DAILY
Status: DISCONTINUED | OUTPATIENT
Start: 2025-05-12 | End: 2025-05-13

## 2025-05-12 RX ORDER — SODIUM CHLORIDE, SODIUM GLUCONATE, SODIUM ACETATE, POTASSIUM CHLORIDE, MAGNESIUM CHLORIDE, SODIUM PHOSPHATE, DIBASIC, AND POTASSIUM PHOSPHATE .53; .5; .37; .037; .03; .012; .00082 G/100ML; G/100ML; G/100ML; G/100ML; G/100ML; G/100ML; G/100ML
1000 INJECTION, SOLUTION INTRAVENOUS ONCE
Status: COMPLETED | OUTPATIENT
Start: 2025-05-12 | End: 2025-05-12

## 2025-05-12 RX ORDER — MIRTAZAPINE 15 MG/1
7.5 TABLET, FILM COATED ORAL
Status: DISCONTINUED | OUTPATIENT
Start: 2025-05-12 | End: 2025-05-21 | Stop reason: HOSPADM

## 2025-05-12 RX ORDER — ATORVASTATIN CALCIUM 80 MG/1
80 TABLET, FILM COATED ORAL
Status: DISCONTINUED | OUTPATIENT
Start: 2025-05-12 | End: 2025-05-21 | Stop reason: HOSPADM

## 2025-05-12 RX ORDER — GABAPENTIN 300 MG/1
300 CAPSULE ORAL 3 TIMES DAILY
Status: DISCONTINUED | OUTPATIENT
Start: 2025-05-12 | End: 2025-05-21 | Stop reason: HOSPADM

## 2025-05-12 RX ORDER — LOSARTAN POTASSIUM 50 MG/1
50 TABLET ORAL DAILY
Status: DISCONTINUED | OUTPATIENT
Start: 2025-05-13 | End: 2025-05-21 | Stop reason: HOSPADM

## 2025-05-12 RX ORDER — ACETAMINOPHEN 325 MG/1
650 TABLET ORAL EVERY 6 HOURS PRN
Status: DISCONTINUED | OUTPATIENT
Start: 2025-05-12 | End: 2025-05-21 | Stop reason: HOSPADM

## 2025-05-12 RX ORDER — ALBUTEROL SULFATE 0.83 MG/ML
2.5 SOLUTION RESPIRATORY (INHALATION) EVERY 6 HOURS PRN
Status: DISCONTINUED | OUTPATIENT
Start: 2025-05-12 | End: 2025-05-13

## 2025-05-12 RX ORDER — BACLOFEN 10 MG/1
5 TABLET ORAL 3 TIMES DAILY
Status: DISCONTINUED | OUTPATIENT
Start: 2025-05-12 | End: 2025-05-21 | Stop reason: HOSPADM

## 2025-05-12 RX ORDER — FUROSEMIDE 40 MG/1
40 TABLET ORAL DAILY
Status: DISCONTINUED | OUTPATIENT
Start: 2025-05-13 | End: 2025-05-16

## 2025-05-12 RX ORDER — AMLODIPINE BESYLATE 10 MG/1
10 TABLET ORAL DAILY
Status: DISCONTINUED | OUTPATIENT
Start: 2025-05-13 | End: 2025-05-15

## 2025-05-12 RX ORDER — CLOPIDOGREL BISULFATE 75 MG/1
75 TABLET ORAL DAILY
Status: DISCONTINUED | OUTPATIENT
Start: 2025-05-13 | End: 2025-05-21 | Stop reason: HOSPADM

## 2025-05-12 RX ORDER — SODIUM CHLORIDE 9 MG/ML
100 INJECTION, SOLUTION INTRAVENOUS CONTINUOUS
Status: DISPENSED | OUTPATIENT
Start: 2025-05-12 | End: 2025-05-13

## 2025-05-12 RX ADMIN — PIPERACILLIN AND TAZOBACTAM 4.5 G: 36; 4.5 INJECTION, POWDER, FOR SOLUTION INTRAVENOUS at 19:17

## 2025-05-12 RX ADMIN — SODIUM CHLORIDE 100 ML/HR: 0.9 INJECTION, SOLUTION INTRAVENOUS at 20:30

## 2025-05-12 RX ADMIN — SODIUM CHLORIDE, SODIUM GLUCONATE, SODIUM ACETATE, POTASSIUM CHLORIDE, MAGNESIUM CHLORIDE, SODIUM PHOSPHATE, DIBASIC, AND POTASSIUM PHOSPHATE 1000 ML: .53; .5; .37; .037; .03; .012; .00082 INJECTION, SOLUTION INTRAVENOUS at 18:01

## 2025-05-12 RX ADMIN — PIPERACILLIN AND TAZOBACTAM 4.5 G: 36; 4.5 INJECTION, POWDER, FOR SOLUTION INTRAVENOUS at 23:40

## 2025-05-12 RX ADMIN — BACLOFEN 5 MG: 10 TABLET ORAL at 20:45

## 2025-05-12 RX ADMIN — IOHEXOL 85 ML: 350 INJECTION, SOLUTION INTRAVENOUS at 16:49

## 2025-05-12 RX ADMIN — MIRTAZAPINE 7.5 MG: 15 TABLET, FILM COATED ORAL at 20:45

## 2025-05-12 RX ADMIN — SODIUM CHLORIDE, SODIUM GLUCONATE, SODIUM ACETATE, POTASSIUM CHLORIDE, MAGNESIUM CHLORIDE, SODIUM PHOSPHATE, DIBASIC, AND POTASSIUM PHOSPHATE 1000 ML: .53; .5; .37; .037; .03; .012; .00082 INJECTION, SOLUTION INTRAVENOUS at 16:25

## 2025-05-12 RX ADMIN — CEFTRIAXONE SODIUM 2000 MG: 10 INJECTION, POWDER, FOR SOLUTION INTRAVENOUS at 16:11

## 2025-05-12 RX ADMIN — GABAPENTIN 300 MG: 300 CAPSULE ORAL at 20:45

## 2025-05-12 RX ADMIN — SODIUM CHLORIDE 1000 ML: 0.9 INJECTION, SOLUTION INTRAVENOUS at 15:32

## 2025-05-12 RX ADMIN — ATORVASTATIN CALCIUM 80 MG: 80 TABLET, FILM COATED ORAL at 20:46

## 2025-05-12 NOTE — ASSESSMENT & PLAN NOTE
"Lab Results   Component Value Date    HGBA1C 6.3 (H) 02/10/2025       No results for input(s): \"POCGLU\" in the last 72 hours.    Blood Sugar Average: Last 72 hrs:  Home medications: Empagliflozin, Januvia, Flonase 1000 mg daily,  Sliding scale  Diabetic diet    "

## 2025-05-12 NOTE — SEPSIS NOTE
"  Sepsis Note   Mathew Rico 74 y.o. male MRN: [id number]  Unit/Bed#: [facility]B Encounter: [id number]       Initial Sepsis Screening       Row Name 05/12/25 1613                Is the patient's history suggestive of a new or worsening infection? Yes (Proceed)  -MM        Suspected source of infection pneumonia  -MM        Indicate SIRS criteria Tachycardia > 90 bpm;Tachypnea > 20 resp per min;Leukocytosis (WBC > 16604 IJL) OR Leukopenia (WBC <4000 IJL) OR Bandemia (WBC >10% bands)  -MM        Are two or more of the above signs & symptoms of infection both present and new to the patient? Yes (Proceed)  -MM        Assess for evidence of organ dysfunction: Are any of the below criteria present within 6 hours of suspected infection and SIRS criteria that are NOT considered to be chronic conditions? MAP < 65;SBP < 90;Lactate > 2.0;Creatinine > 2.0  -MM        Date of presentation of severe sepsis 05/12/25  -MM        Time of presentation of severe sepsis 1613  -MM        Date of presentation of septic shock 05/12/25  -MM        Time of presentation of septic shock 1613  -MM        Fluid Resuscitation: 30 ml/kg IV fluid bolus will be given based on actual body weight  -MM        Is the patient is persistently hypotensive in the hour after fluid bolus administration? If yes, patient meets criteria for vasopressor use. --        Sepsis Note: Click \"NEXT\" below (NOT \"close\") to generate sepsis note based on above information. YES (proceed by clicking \"NEXT\")  -MM                  User Key  (r) = Recorded By, (t) = Taken By, (c) = Cosigned By      Initials Name Provider Type    UNA Alicia DO Physician                        There is no height or weight on file to calculate BMI.  Wt Readings from Last 1 Encounters:   02/11/25 64.1 kg (141 lb 5 oz)        Ideal body weight: 59.2 kg (130 lb 8.2 oz)  Adjusted ideal body weight: 61.2 kg (134 lb 13.3 oz)    "

## 2025-05-12 NOTE — H&P
"H&P - Hospitalist   Name: Mathew Rico 74 y.o. male I MRN: [id number]  Unit/Bed#: CRB I Date of Admission: 5/12/2025   Date of Service: 5/12/2025 I Hospital Day: 0     Assessment & Plan  Sepsis ([facility])  Lab Results   Component Value Date    WBC 18.68 (H) 05/12/2025    WBC 13.09 (H) 02/24/2025    WBC 10.11 02/22/2025    PROCALCITONI 0.15 05/12/2025    PROCALCITONI <0.05 02/10/2025    PROCALCITONI 0.09 01/22/2025    LACTICACID 2.1 (H) 05/12/2025    LACTICACID 3.8 (H) 05/12/2025    LACTICACID 2.1 (H) 02/11/2025         Presents with elevated white count 18 p.o.,tachycardia, tachypnea, elevated lactic acid 3.8  Likely secondary to pneumonia  CT chest abdomen pelvis with contrast showed  Retained secretions in the left mainstem bronchus and occlusion of the left lower lobe bronchus and its major branches with more distal mucous plugging in the left lower lobe  Possible aspiration pneumonia  -Marked bladder wall thickening possible cystitis  Plan  Follow blood cultures, urine cultures  Continue fluids  Trend wbc and fever curve  Her previous urine cultures resistant to ceftriaxone.  Will do Zosyn for now  Pulm consult  Urology for indwelling suprapubic Cage catheter exchange  Type 2 diabetes mellitus without complication, without long-term current use of insulin ([facility])  Lab Results   Component Value Date    HGBA1C 6.3 (H) 02/10/2025       No results for input(s): \"POCGLU\" in the last 72 hours.    Blood Sugar Average: Last 72 hrs:  Home medications: Empagliflozin, Januvia, Flonase 1000 mg daily,  Sliding scale  Diabetic diet    Primary hypertension  Amlodipine 10 mg daily  Lasix 40 mg once daily  Losartan 50 mg once daily  Holding blood pressure meds in the setting of sepsis    GERSON on CPAP  CPAP at night  Hyperlipidemia  Patient takes atorvastatin 80 mg daily at bedtime  Chronic diastolic congestive heart failure (HCC)  Wt Readings from Last 3 Encounters:   02/11/25 64.1 kg (141 lb 5 oz)   01/20/25 64.1 kg (141 lb 5 oz) "   12/31/24 71 kg (156 lb 8.4 oz)   Hold Lasix in the setting of sepsis                VTE Pharmacologic Prophylaxis: VTE Score: 6 Moderate Risk (Score 3-4) - Pharmacological DVT Prophylaxis Ordered: enoxaparin (Lovenox).  Code Status: Level 1 full code  Discussion with family: Unable to reach brother, tried to call a few times    Anticipated Length of Stay: Patient will be admitted on an inpatient basis with an anticipated length of stay of greater than 2 midnights secondary to sepsis.    History of Present Illness   Chief Complaint: Sepsis    Mathew Rico is a 74 y.o. male with a PMH of past medical history of hypertension, hyperlipidemia, congestive heart failure, obstructive sleep apnea, coming in for change in mental status and cough.  In the emergency department patient did meet sepsis criteria and received 3 L of fluid and received ceftriaxone.  Will be admitted for sepsis         Review of Systems   Unable to perform ROS: Mental status change       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HCC)     Bladder infection     Bronchitis     Constipation     Cough     Diabetes (HCC)     Diabetes mellitus (HCC)     Dizziness     Dysfunction of eustachian tube     Erectile dysfunction of non-organic origin     Fatigue     Glaucoma     Hiatal hernia     Hypertension     Imbalance     Leg muscle spasm     Leukocytosis 04/01/2024    MS (multiple sclerosis) (HCC)     Multiple sclerosis (HCC)     Nausea and vomiting 02/16/2025    Nephrolithiasis     Neurogenic bladder     No natural teeth     Sinus pain     Spinal stenosis     Strain of thoracic region     Stroke (HCC)     Suprapubic catheter (HCC)      Past Surgical History:   Procedure Laterality Date    APPENDECTOMY      BRAIN SURGERY      Coil placed in aneurysm    CEREBRAL ANEURYSM REPAIR       CYSTOSCOPY      CYSTOSCOPY      CYSTOSCOPY  2018    CYSTOSCOPY  01/15/2021    EYE SURGERY      transscleral cyclophotocoagulation noncontact YAG laser    IR SUPRAPUBIC CATHETER CHECK/CHANGE/REINSERTION/UPSIZE  3/28/2024    MYRINGOTOMY      with ventilation tube insertion    MS LITHOLAPAXY SMPL/SM <2.5 CM N/A 2019    Procedure: CYSTOSCOPY, holmium laser litholapaxy of bladder stones, EXCHANGE OF SP TUBE;  Surgeon: Javy Jeffries MD;  Location: [facility];  Service: Urology    SUPRAPUBIC CATHETER INSERTION       Social History     Tobacco Use    Smoking status: Former     Current packs/day: 0.00     Average packs/day: 0.5 packs/day for 31.0 years (15.5 ttl pk-yrs)     Types: Cigarettes     Start date:      Quit date:      Years since quittin.3    Smokeless tobacco: Never   Vaping Use    Vaping status: Never Used   Substance and Sexual Activity    Alcohol use: Not Currently    Drug use: No    Sexual activity: Not Currently     E-Cigarette/Vaping    E-Cigarette Use Never User      E-Cigarette/Vaping Substances    Nicotine No     THC No     CBD No     Flavoring No     Other No     Unknown No      Family history non-contributory  Social History:  Marital Status: Single   Occupation: None  Patient Pre-hospital Living Situation: Assisted Living  Patient Pre-hospital Level of Mobility: walks  Patient Pre-hospital Diet Restrictions: Nopne    Meds/Allergies   I have reviewed home medications with patient personally.  Prior to Admission medications    Medication Sig Start Date End Date Taking? Authorizing Provider   acetaminophen (TYLENOL) 325 mg tablet Take 2 tablets (650 mg total) by mouth every 6 (six) hours as needed for mild pain 3/29/24   Cristin Booth MD   albuterol (2.5 mg/3 mL) 0.083 % nebulizer solution Take 3 mL (2.5 mg total) by nebulization every 6 (six) hours as needed for wheezing or shortness of breath 10/3/23   Nelson Cabezas MD   amLODIPine (NORVASC) 10 mg tablet Take 1 tablet  (10 mg total) by mouth daily  Patient not taking: Reported on 3/13/2025 2/26/25   Aly Pinto MD   atorvastatin (LIPITOR) 80 mg tablet Take 80 mg by mouth at bedtime  Patient taking differently: Take 40 mg by mouth at bedtime    Historical Provider, MD   baclofen 10 mg tablet Take 5 mg by mouth 3 (three) times a day    Historical Provider, MD   bisacodyl (DULCOLAX) 10 mg suppository Insert 1 suppository (10 mg total) into the rectum daily as needed for constipation for up to 12 doses 10/10/24   Mary Kemp MD   budesonide-formoterol (SYMBICORT) 160-4.5 mcg/act inhaler Inhale 2 puffs 2 (two) times a day Rinse mouth after use.    Historical Provider, MD   clopidogrel (PLAVIX) 75 mg tablet Take 1 tablet (75 mg total) by mouth daily 10/27/18   Kraig Griffin MD   cromolyn (NASALCHROM) 5.2 MG/ACT nasal spray 1 spray into each nostril 3 (three) times a day  Patient not taking: Reported on 3/13/2025 6/18/24   Steve Shook DO   cyanocobalamin (VITAMIN B-12) 500 MCG tablet Take 1 tablet (500 mcg total) by mouth daily  Patient not taking: Reported on 3/13/2025 4/2/24   SHA Sutton   Empagliflozin (JARDIANCE) 10 MG TABS tablet Take 10 mg by mouth every morning  Patient not taking: Reported on 3/13/2025    Historical Provider, MD   Ergocalciferol (VITAMIN D2 PO) Take 50,000 Units by mouth once a week    Historical Provider, MD   furosemide (LASIX) 40 mg tablet Take 40 mg by mouth daily  Patient not taking: Reported on 3/13/2025    Historical Provider, MD   gabapentin (NEURONTIN) 300 mg capsule Take 1 capsule (300 mg total) by mouth 3 (three) times a day 12/4/24   Brendon Pepper DO   gabapentin (NEURONTIN) 300 mg capsule Take 2 capsules (600 mg total) by mouth daily at bedtime 12/4/24   Brendon Pepper DO   latanoprost (XALATAN) 0.005 % ophthalmic solution Administer 1 drop to both eyes daily at bedtime    Historical Provider, MD   losartan (COZAAR) 50 mg tablet Take 50 mg by mouth daily     Historical Provider, MD   melatonin 3 mg Take 3 mg by mouth daily at bedtime as needed  Patient not taking: Reported on 3/13/2025    Historical Provider, MD   metFORMIN (GLUCOPHAGE) 1000 MG tablet Take 1,000 mg by mouth daily with breakfast    Historical Provider, MD   methenamine hippurate (HIPREX) 1 g tablet Take 1 tablet (1 g total) by mouth 2 (two) times a day with meals 3/27/24   Brianna Ramirez PA-C   mirtazapine (REMERON) 7.5 MG tablet Take 7.5 mg by mouth daily at bedtime    Historical Provider, MD   pantoprazole (PROTONIX) 40 mg tablet Take 40 mg by mouth daily    Historical Provider, MD   polyethylene glycol (MIRALAX) 17 g packet Take 17 g by mouth 2 (two) times a day 6/18/24   Steve Shook DO   potassium chloride (Klor-Con M10) 10 mEq tablet Take 10 mEq by mouth 2 (two) times a day  Patient not taking: Reported on 3/13/2025    Historical Provider, MD   propranolol (INDERAL LA) 60 mg 24 hr capsule Take 60 mg by mouth daily  Patient not taking: Reported on 3/13/2025    Historical Provider, MD   senna-docusate sodium (SENOKOT S) 8.6-50 mg per tablet Take 2 tablets by mouth daily at bedtime 10/10/24   Mary Kemp MD   sitaGLIPtin (JANUVIA) 100 mg tablet Take 100 mg by mouth daily  Patient not taking: Reported on 3/13/2025    Historical Provider, MD     Allergies   Allergen Reactions    Cephalexin Rash    Cephalexin Rash       Objective :  Temp:  [99.8 °F (37.7 °C)] 99.8 °F (37.7 °C)  HR:  [] 93  BP: ()/(44-78) 119/57  Resp:  [16-23] 18  SpO2:  [92 %-98 %] 92 %  O2 Device: None (Room air)  Nasal Cannula O2 Flow Rate (L/min):  [2 L/min] 2 L/min    Physical Exam  Vitals and nursing note reviewed.   Constitutional:       Appearance: He is well-developed and normal weight. He is ill-appearing.      Comments: Patient appears dry.   HENT:      Head: Normocephalic and atraumatic.      Mouth/Throat:      Mouth: Mucous membranes are dry.   Cardiovascular:      Rate and Rhythm: Normal rate and  regular rhythm.      Heart sounds: Normal heart sounds. No murmur heard.  Pulmonary:      Effort: Pulmonary effort is normal. No respiratory distress.      Breath sounds: Rales present.   Abdominal:      General: Abdomen is flat.      Palpations: Abdomen is soft.      Tenderness: There is no abdominal tenderness.   Musculoskeletal:         General: No swelling.      Cervical back: Neck supple.      Right lower leg: No edema.      Left lower leg: No edema.   Skin:     General: Skin is warm and dry.      Capillary Refill: Capillary refill takes less than 2 seconds.   Neurological:      General: No focal deficit present.      Mental Status: He is alert. Mental status is at baseline. He is disoriented.   Psychiatric:         Mood and Affect: Mood normal.          Lines/Drains:            Lab Results: I have reviewed the following results:  Results from last 7 days   Lab Units 05/12/25  1533   WBC Thousand/uL 18.68*   HEMOGLOBIN g/dL 12.5   HEMATOCRIT % 38.6   PLATELETS Thousands/uL 370   SEGS PCT % 79*   LYMPHO PCT % 11*   MONO PCT % 7   EOS PCT % 2     Results from last 7 days   Lab Units 05/12/25  1533   SODIUM mmol/L 136   POTASSIUM mmol/L 4.3   CHLORIDE mmol/L 103   CO2 mmol/L 21   BUN mg/dL 35*   CREATININE mg/dL 1.48*   ANION GAP mmol/L 12   CALCIUM mg/dL 9.1   ALBUMIN g/dL 3.4*   TOTAL BILIRUBIN mg/dL 0.36   ALK PHOS U/L 68   ALT U/L 8   AST U/L 13   GLUCOSE RANDOM mg/dL 97     Results from last 7 days   Lab Units 05/12/25  1622   INR  1.02         Lab Results   Component Value Date    HGBA1C 6.3 (H) 02/10/2025    HGBA1C 9.0 (H) 10/06/2024    HGBA1C 8.0 (H) 06/16/2024     Results from last 7 days   Lab Units 05/12/25  1755 05/12/25  1533   LACTIC ACID mmol/L 2.1* 3.8*   PROCALCITONIN ng/ml  --  0.15       Imaging Results Review: I personally reviewed the following image studies/reports in PACS and discussed pertinent findings with Radiology: CT abdomen/pelvis. My interpretation of the radiology images/reports is:  Pneumonia.  Other Study Results Review: EKG was reviewed.     Administrative Statements   I have spent a total time of 80 minutes in caring for this patient on the day of the visit/encounter including Diagnostic results, Prognosis, Risks and benefits of tx options, Instructions for management, Patient and family education, Importance of tx compliance, Risk factor reductions, Impressions, Counseling / Coordination of care, Documenting in the medical record, Reviewing/placing orders in the medical record (including tests, medications, and/or procedures), Obtaining or reviewing history  , and Communicating with other healthcare professionals .    ** Please Note: This note has been constructed using a voice recognition system. **

## 2025-05-12 NOTE — ED PROVIDER NOTES
Time reflects when diagnosis was documented in both MDM as applicable and the Disposition within this note       Time User Action Codes Description Comment    5/12/2025  4:07 PM Zeeshan Alicia [A41.9,  R65.20] Severe sepsis (HCC)     5/12/2025  5:53 PM Zeeshan Alicia [J18.9] Pneumonia     5/12/2025  5:53 PM Zeeshan Alicia [N30.90] Cystitis     5/12/2025  5:53 PM Zeeshan Alicia [N17.9] ARMANDO (acute kidney injury) (HCC)           ED Disposition       ED Disposition   Admit    Condition   Stable    Date/Time   Mon May 12, 2025  5:58 PM    Comment   Case was discussed with [facility] and the patient's admission status was agreed to be Admission Status: inpatient status to the service of Dr. Abdi .               Assessment & Plan       Medical Decision Making  Will evaluate for pneumonia versus urinary tract infection versus intra-abdominal infection.  Increased shortness of breath but tachypnea and tachycardia.  Will evaluate for sepsis.  Noted leukocytosis, tachycardia and tachypnea.  Will follow sepsis pathway.  Patient has elevated lactic acidosis at 3.8.  Initially was given 1 L of IV fluid.  Patient transiently had 1 blood pressure reading that was hypotensive however responded well once patient was woken up.  Patient received a total of 3 L crystalloid fluid and ceftriaxone to cover for pneumonia.  Patient had improvement of his symptoms on repeat evaluations.  No repeat episodes of hypotension.  Case was discussed with medicine and admitted to their service.  CT scan does show chronic bladder thickening.  Low concern for urinary tract infection at this time given change in respiratory status.    Amount and/or Complexity of Data Reviewed  Labs: ordered.  Radiology: ordered. Decision-making details documented in ED Course.    Risk  Prescription drug management.  Decision regarding hospitalization.        ED Course as of 05/13/25 1504   Mon May 12, 2025   1606 Severe sepsis    1607 Sepsis alert called     1612 Blood Pressure(!): 73/44  Will treat with fluid bolus    1746 Reevaluate this time.  Second liter in.  Getting third liter of IV fluids.  Patient much more responsive with mental status.  No repeat episodes of hypotension.   1752 CT chest abdomen pelvis w contrast  IMPRESSION:     1) Retained secretions in the left mainstem bronchus and occlusion of the left lower lobe bronchus and its major branches, with some more distal mucous plugging in the left lower lobe. Left lower lobe airspace opacities, new from February 2025, likely   representing aspiration pneumonia.     2) Suprapubic Cage catheter in place with marked bladder wall thickening, mucosal hyperenhancement, and perivesical fat stranding indicative of cystitis. No evidence of pyelonephritis.     3) Trace ascites around the urinary bladder, likely reactive to aforementioned cystitis. No organized collections to suggest an abscess.     4) Circumferential wall thickening in the mid and lower rectum, which may indicate proctitis.     5) No other acute abdominal or pelvic pathology.     6) Additional findings as above.            Medications   acetaminophen (TYLENOL) tablet 650 mg (has no administration in time range)   amLODIPine (NORVASC) tablet 10 mg ( Oral Held by provider 5/12/25 1846)   atorvastatin (LIPITOR) tablet 80 mg (has no administration in time range)   baclofen tablet 5 mg (has no administration in time range)   clopidogrel (PLAVIX) tablet 75 mg (has no administration in time range)   furosemide (LASIX) tablet 40 mg ( Oral Held by provider 5/12/25 1846)   gabapentin (NEURONTIN) capsule 300 mg (has no administration in time range)   losartan (COZAAR) tablet 50 mg ( Oral Held by provider 5/12/25 1846)   mirtazapine (REMERON) tablet 7.5 mg (has no administration in time range)   pantoprazole (PROTONIX) EC tablet 40 mg (has no administration in time range)   piperacillin-tazobactam (ZOSYN) 4.5 g in sodium chloride 0.9 % 100 mL IV LOADING DOSE  (4.5 g Intravenous New Bag 5/12/25 1917)     Followed by   piperacillin-tazobactam (ZOSYN) 4.5 g in sodium chloride 0.9 % 100 mL IVPB (EXTENDED INFUSION) (has no administration in time range)   sodium chloride 0.9 % infusion (has no administration in time range)   ceftriaxone (ROCEPHIN) 2 g/50 mL in dextrose IVPB (0 mg Intravenous Stopped 5/12/25 1641)   sodium chloride 0.9 % bolus 1,000 mL (0 mL Intravenous Stopped 5/12/25 1732)   multi-electrolyte (Plasmalyte-A/Isolyte-S PH 7.4/Normosol-R) IV bolus 1,000 mL (0 mL Intravenous Stopped 5/12/25 1655)     Followed by   multi-electrolyte (Plasmalyte-A/Isolyte-S PH 7.4/Normosol-R) IV bolus 1,000 mL (0 mL Intravenous Stopped 5/12/25 1831)   iohexol (OMNIPAQUE) 350 MG/ML injection (MULTI-DOSE) 85 mL (85 mL Intravenous Given 5/12/25 1649)       ED Risk Strat Scores                    No data recorded                            History of Present Illness       Chief Complaint   Patient presents with    Altered Mental Status     Pt had recent pneumonia, nursing home noticed that today he had become lethargic and less talkative, pt also has a cough       Past Medical History:   Diagnosis Date    Acute laryngitis     Acute nonsuppurative otitis media, unspecified laterality     Arm weakness     Arthritis     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HCC)     Bladder infection     Bronchitis     Constipation     Cough     Diabetes (HCC)     Diabetes mellitus (HCC)     Dizziness     Dysfunction of eustachian tube     Erectile dysfunction of non-organic origin     Fatigue     Glaucoma     Hiatal hernia     Hypertension     Imbalance     Leg muscle spasm     Leukocytosis 04/01/2024    MS (multiple sclerosis) (HCC)     Multiple sclerosis (HCC)     Nausea and vomiting 02/16/2025    Nephrolithiasis     Neurogenic bladder     No natural teeth     Sinus pain     Spinal stenosis     Strain of thoracic region     Stroke ([facility])     Suprapubic catheter ([facility])       Past Surgical History:   Procedure  Laterality Date    APPENDECTOMY      BRAIN SURGERY      Coil placed in aneurysm    CEREBRAL ANEURYSM REPAIR      CYSTOSCOPY      CYSTOSCOPY      CYSTOSCOPY  2018    CYSTOSCOPY  01/15/2021    EYE SURGERY      transscleral cyclophotocoagulation noncontact YAG laser    IR SUPRAPUBIC CATHETER CHECK/CHANGE/REINSERTION/UPSIZE  3/28/2024    MYRINGOTOMY      with ventilation tube insertion    HI LITHOLAPAXY SMPL/SM <2.5 CM N/A 2019    Procedure: CYSTOSCOPY, holmium laser litholapaxy of bladder stones, EXCHANGE OF SP TUBE;  Surgeon: Javy Jeffries MD;  Location: [facility];  Service: Urology    SUPRAPUBIC CATHETER INSERTION        Family History   Problem Relation Age of Onset    Heart attack Mother     Stroke Mother     Heart attack Father     Anuerysm Father         In Stomach     Aneurysm Father       Social History     Tobacco Use    Smoking status: Former     Current packs/day: 0.00     Average packs/day: 0.5 packs/day for 31.0 years (15.5 ttl pk-yrs)     Types: Cigarettes     Start date:      Quit date:      Years since quittin.3    Smokeless tobacco: Never   Vaping Use    Vaping status: Never Used   Substance Use Topics    Alcohol use: Not Currently    Drug use: No      E-Cigarette/Vaping    E-Cigarette Use Never User       E-Cigarette/Vaping Substances    Nicotine No     THC No     CBD No     Flavoring No     Other No     Unknown No       I have reviewed and agree with the history as documented.     Dieudonne is a chronically ill 74-year-old male with a past medical history of diabetes, hypertension, multiple sclerosis, GERSON on CPAP, diastolic CHF, hospital admissions for acute kidney injury and sepsis, chronic indwelling suprapubic catheter, history of stroke, history of thyroid nodule, presents to the emergency department with a change in mental status and new cough.  Unknown how long he has developed this cough for.  Based on chart review patient was admitted once in January once in February for  urinary tract infection that grew out multidrug-resistant organisms.  Patient has cough with scattered rhonchi in bilateral bases.  He is unable to provide additional history due to change in mental status.        Review of Systems   Unable to perform ROS: Mental status change           Objective       ED Triage Vitals   Temperature Pulse Blood Pressure Respirations SpO2 Patient Position - Orthostatic VS   05/12/25 1524 05/12/25 1512 05/12/25 1512 05/12/25 1512 05/12/25 1512 05/12/25 1512   99.8 °F (37.7 °C) 100 114/55 20 97 % Sitting      Temp Source Heart Rate Source BP Location FiO2 (%) Pain Score    05/12/25 1524 05/12/25 1545 05/12/25 1512 -- 05/12/25 2000    Rectal Monitor Left arm  No Pain      Vitals      Date and Time Temp Pulse SpO2 Resp BP Pain Score FACES Pain Rating User   05/13/25 0841 -- -- -- -- -- No Pain -- EO   05/13/25 0753 98.7 °F (37.1 °C) 77 95 % 16 113/60 -- -- DII   05/12/25 2028 98 °F (36.7 °C) 93 96 % 20 126/56 -- -- DII   05/12/25 2000 -- -- -- -- -- No Pain -- DR   05/12/25 1915 -- 95 98 % 19 142/64 -- --    05/12/25 1900 -- 99 97 % 23 141/64 -- --    05/12/25 1845 -- 91 98 % 20 122/56 -- --    05/12/25 1830 -- 93 92 % 18 119/57 -- --    05/12/25 1815 -- 91 96 % 19 121/57 -- --    05/12/25 1800 -- 96 96 % 20 120/58 -- --    05/12/25 1745 -- 91 96 % 20 123/78 -- --    05/12/25 1730 -- 89 97 % 16 117/59 -- --    05/12/25 1715 -- 90 96 % 23 123/59 -- --    05/12/25 1700 -- 91 97 % 18 123/59 -- --    05/12/25 1645 -- 90 98 % 16 139/59 -- --    05/12/25 1630 -- 85 97 % 16 109/55 -- --    05/12/25 1615 -- 93 94 % 22 96/52 -- --    05/12/25 1600 -- 89 94 % 19 73/44 -- --    05/12/25 1545 -- 93 96 % 22 108/53 -- --    05/12/25 1524 99.8 °F (37.7 °C) -- -- -- -- -- -- PS   05/12/25 1512 -- 100 97 % 20 114/55 -- -- PS            Physical Exam  Vitals and nursing note reviewed.   Constitutional:       Appearance: He is well-developed. He is ill-appearing.   HENT:       Head: Normocephalic and atraumatic.   Eyes:      Conjunctiva/sclera: Conjunctivae normal.   Cardiovascular:      Rate and Rhythm: Regular rhythm. Tachycardia present.      Heart sounds: No murmur heard.  Pulmonary:      Effort: Pulmonary effort is normal. No respiratory distress.      Breath sounds: Rhonchi present.   Abdominal:      Palpations: Abdomen is soft.      Tenderness: There is no abdominal tenderness.   Genitourinary:     Comments: Suprapubic cath   Musculoskeletal:         General: No swelling.      Cervical back: Neck supple.   Skin:     General: Skin is warm and dry.      Capillary Refill: Capillary refill takes less than 2 seconds.   Neurological:      General: No focal deficit present.      Mental Status: He is alert and oriented to person, place, and time. Mental status is at baseline.      GCS: GCS eye subscore is 4. GCS verbal subscore is 5. GCS motor subscore is 6.      Cranial Nerves: No cranial nerve deficit.      Sensory: No sensory deficit.      Motor: No weakness.      Coordination: Coordination normal.      Gait: Gait normal.   Psychiatric:         Mood and Affect: Mood normal.         Results Reviewed       Procedure Component Value Units Date/Time    Urine culture [[id number]]  (Abnormal) Collected: 05/12/25 1538    Lab Status: Preliminary result Specimen: Urine, Suprapubic catheter Updated: 05/13/25 1445     Urine Culture >100,000 cfu/ml Staphylococcus aureus    Comprehensive metabolic panel [[id number]]  (Abnormal) Collected: 05/13/25 0543    Lab Status: Final result Specimen: Blood from Arm, Right Updated: 05/13/25 0637     Sodium 138 mmol/L      Potassium 3.7 mmol/L      Chloride 107 mmol/L      CO2 23 mmol/L      ANION GAP 8 mmol/L      BUN 21 mg/dL      Creatinine 0.92 mg/dL      Glucose 96 mg/dL      Calcium 8.4 mg/dL      Corrected Calcium 9.4 mg/dL      AST 13 U/L      ALT 6 U/L      Alkaline Phosphatase 57 U/L      Total Protein 5.9 g/dL      Albumin 2.8 g/dL      Total Bilirubin  0.38 mg/dL      eGFR 81 ml/min/1.73sq m     Narrative:      National Kid[employer] Foundation guidelines for Chronic Kidney Disease (CKD):     Stage 1 with normal or high GFR (GFR > 90 mL/min/1.73 square meters)    Stage 2 Mild CKD (GFR = 60-89 mL/min/1.73 square meters)    Stage 3A Moderate CKD (GFR = 45-59 mL/min/1.73 square meters)    Stage 3B Moderate CKD (GFR = 30-44 mL/min/1.73 square meters)    Stage 4 Severe CKD (GFR = 15-29 mL/min/1.73 square meters)    Stage 5 End Stage CKD (GFR <15 mL/min/1.73 square meters)  Note: GFR calculation is accurate only with a steady state creatinine    Magnesium [[id number]]  (Abnormal) Collected: 05/13/25 0543    Lab Status: Final result Specimen: Blood from Arm, Right Updated: 05/13/25 0637     Magnesium 1.7 mg/dL     CBC and differential [[id number]]  (Abnormal) Collected: 05/13/25 0543    Lab Status: Final result Specimen: Blood from Arm, Right Updated: 05/13/25 0612     WBC 15.57 Thousand/uL      RBC 3.95 Million/uL      Hemoglobin 10.5 g/dL      Hematocrit 32.4 %      MCV 82 fL      MCH 26.6 pg      MCHC 32.4 g/dL      RDW 16.1 %      MPV 8.0 fL      Platelets 329 Thousands/uL      nRBC 0 /100 WBCs      Segmented % 69 %      Immature Grans % 1 %      Lymphocytes % 17 %      Monocytes % 8 %      Eosinophils Relative 4 %      Basophils Relative 1 %      Absolute Neutrophils 10.70 Thousands/µL      Absolute Immature Grans 0.13 Thousand/uL      Absolute Lymphocytes 2.70 Thousands/µL      Absolute Monocytes 1.30 Thousand/µL      Eosinophils Absolute 0.65 Thousand/µL      Basophils Absolute 0.09 Thousands/µL     Blood culture #1 [[id number]] Collected: 05/12/25 1538    Lab Status: Preliminary result Specimen: Blood from Arm, Right Updated: 05/12/25 2001     Blood Culture Received in Microbiology Lab. Culture in Progress.    Blood culture #2 [[id number]] Collected: 05/12/25 1533    Lab Status: Preliminary result Specimen: Blood from Arm, Left Updated: 05/12/25 2001     Blood  Culture Received in Microbiology Lab. Culture in Progress.    Lactic acid 2 Hours [[id number]]  (Abnormal) Collected: 05/12/25 1755    Lab Status: Final result Specimen: Blood from Arm, Right Updated: 05/12/25 1826     LACTIC ACID 2.1 mmol/L     Narrative:      Result may be elevated if tourniquet was used during collection.    Protime-INR [[id number]]  (Normal) Collected: 05/12/25 1622    Lab Status: Final result Specimen: Blood from Arm, Left Updated: 05/12/25 1703     Protime 13.7 seconds      INR 1.02    Narrative:      INR Therapeutic Range    Indication                                             INR Range      Atrial Fibrillation                                               2.0-3.0  Hypercoagulable State                                    2.0.2.3  Left Ventricular Asist Device                            2.0-3.0  Mechanical Heart Valve                                  -    Aortic(with afib, MI, embolism, HF, LA enlargement,    and/or coagulopathy)                                     2.0-3.0 (2.5-3.5)     Mitral                                                             2.5-3.5  Prosthetic/Bioprosthetic Heart Valve               2.0-3.0  Venous thromboembolism (VTE: VT, PE        2.0-3.0    APTT [[id number]]  (Normal) Collected: 05/12/25 1622    Lab Status: Final result Specimen: Blood from Arm, Left Updated: 05/12/25 1703     PTT 25 seconds     Procalcitonin [[id number]]  (Normal) Collected: 05/12/25 1533    Lab Status: Final result Specimen: Blood from Arm, Left Updated: 05/12/25 1616     Procalcitonin 0.15 ng/ml     Comprehensive metabolic panel [[id number]]  (Abnormal) Collected: 05/12/25 1533    Lab Status: Final result Specimen: Blood from Arm, Left Updated: 05/12/25 1602     Sodium 136 mmol/L      Potassium 4.3 mmol/L      Chloride 103 mmol/L      CO2 21 mmol/L      ANION GAP 12 mmol/L      BUN 35 mg/dL      Creatinine 1.48 mg/dL      Glucose 97 mg/dL      Calcium 9.1 mg/dL      Corrected Calcium 9.6 mg/dL       AST 13 U/L      ALT 8 U/L      Alkaline Phosphatase 68 U/L      Total Protein 7.0 g/dL      Albumin 3.4 g/dL      Total Bilirubin 0.36 mg/dL      eGFR 45 ml/min/1.73sq m     Narrative:      National Kidney Disease Foundation guidelines for Chronic Kidney Disease (CKD):     Stage 1 with normal or high GFR (GFR > 90 mL/min/1.73 square meters)    Stage 2 Mild CKD (GFR = 60-89 mL/min/1.73 square meters)    Stage 3A Moderate CKD (GFR = 45-59 mL/min/1.73 square meters)    Stage 3B Moderate CKD (GFR = 30-44 mL/min/1.73 square meters)    Stage 4 Severe CKD (GFR = 15-29 mL/min/1.73 square meters)    Stage 5 End Stage CKD (GFR <15 mL/min/1.73 square meters)  Note: GFR calculation is accurate only with a steady state creatinine    Lactic acid [[id number]]  (Abnormal) Collected: 05/12/25 1533    Lab Status: Final result Specimen: Blood from Arm, Left Updated: 05/12/25 1602     LACTIC ACID 3.8 mmol/L     Narrative:      Result may be elevated if tourniquet was used during collection.    CBC and differential [[id number]]  (Abnormal) Collected: 05/12/25 1533    Lab Status: Final result Specimen: Blood from Arm, Left Updated: 05/12/25 1547     WBC 18.68 Thousand/uL      RBC 4.68 Million/uL      Hemoglobin 12.5 g/dL      Hematocrit 38.6 %      MCV 83 fL      MCH 26.7 pg      MCHC 32.4 g/dL      RDW 16.1 %      MPV 8.0 fL      Platelets 370 Thousands/uL      nRBC 0 /100 WBCs      Segmented % 79 %      Immature Grans % 1 %      Lymphocytes % 11 %      Monocytes % 7 %      Eosinophils Relative 2 %      Basophils Relative 0 %      Absolute Neutrophils 14.78 Thousands/µL      Absolute Immature Grans 0.14 Thousand/uL      Absolute Lymphocytes 2.05 Thousands/µL      Absolute Monocytes 1.36 Thousand/µL      Eosinophils Absolute 0.28 Thousand/µL      Basophils Absolute 0.07 Thousands/µL             CT chest abdomen pelvis w contrast   Final Interpretation by Renee Morton MD (05/12 0625)      1) Retained secretions in the left mainstem  bronchus and occlusion of the left lower lobe bronchus and its major branches, with some more distal mucous plugging in the left lower lobe. Left lower lobe airspace opacities, new from February 2025, likely    representing aspiration pneumonia.      2) Suprapubic Cage catheter in place with marked bladder wall thickening, mucosal hyperenhancement, and perivesical fat stranding indicative of cystitis. No evidence of pyelonephritis.      3) Trace ascites around the urinary bladder, likely reactive to aforementioned cystitis. No organized collections to suggest an abscess.      4) Circumferential wall thickening in the mid and lower rectum, which may indicate proctitis.      5) No other acute abdominal or pelvic pathology.      6) Additional findings as above.                  Workstation performed: [id number]             Procedures    ED Medication and Procedure Management   Prior to Admission Medications   Prescriptions Last Dose Informant Patient Reported? Taking?   Empagliflozin (JARDIANCE) 10 MG TABS tablet  Outside Facility (Specify) Yes No   Sig: Take 10 mg by mouth every morning   Patient not taking: Reported on 3/13/2025   Ergocalciferol (VITAMIN D2 PO)  Outside Facility (Specify) Yes No   Sig: Take 50,000 Units by mouth once a week   acetaminophen (TYLENOL) 325 mg tablet  Outside Facility (Specify) No No   Sig: Take 2 tablets (650 mg total) by mouth every 6 (six) hours as needed for mild pain   albuterol (2.5 mg/3 mL) 0.083 % nebulizer solution  Outside Facility (Specify) No No   Sig: Take 3 mL (2.5 mg total) by nebulization every 6 (six) hours as needed for wheezing or shortness of breath   amLODIPine (NORVASC) 10 mg tablet  Outside Facility (Specify) No No   Sig: Take 1 tablet (10 mg total) by mouth daily   Patient not taking: Reported on 3/13/2025   atorvastatin (LIPITOR) 80 mg tablet  Outside Facility (Specify) Yes No   Sig: Take 80 mg by mouth at bedtime   Patient taking differently: Take 40 mg by  mouth at bedtime   baclofen 10 mg tablet  Outside Facility (Specify) Yes No   Sig: Take 5 mg by mouth 3 (three) times a day   bisacodyl (DULCOLAX) 10 mg suppository  Outside Facility (Specify) No No   Sig: Insert 1 suppository (10 mg total) into the rectum daily as needed for constipation for up to 12 doses   budesonide-formoterol (SYMBICORT) 160-4.5 mcg/act inhaler  Outside Facility (Specify) Yes No   Sig: Inhale 2 puffs 2 (two) times a day Rinse mouth after use.   clopidogrel (PLAVIX) 75 mg tablet  Outside Facility (Specify) No No   Sig: Take 1 tablet (75 mg total) by mouth daily   cromolyn (NASALCHROM) 5.2 MG/ACT nasal spray  Outside Facility (Specify) No No   Si spray into each nostril 3 (three) times a day   Patient not taking: Reported on 3/13/2025   cyanocobalamin (VITAMIN B-12) 500 MCG tablet  Outside Facility (Specify) No No   Sig: Take 1 tablet (500 mcg total) by mouth daily   Patient not taking: Reported on 3/13/2025   furosemide (LASIX) 40 mg tablet  Outside Facility (Specify) Yes No   Sig: Take 40 mg by mouth daily   Patient not taking: Reported on 3/13/2025   gabapentin (NEURONTIN) 300 mg capsule  Outside Facility (Specify) No No   Sig: Take 1 capsule (300 mg total) by mouth 3 (three) times a day   gabapentin (NEURONTIN) 300 mg capsule  Outside Facility (Specify) No No   Sig: Take 2 capsules (600 mg total) by mouth daily at bedtime   latanoprost (XALATAN) 0.005 % ophthalmic solution  Outside Facility (Specify) Yes No   Sig: Administer 1 drop to both eyes daily at bedtime   losartan (COZAAR) 50 mg tablet  Outside Facility (Specify) Yes No   Sig: Take 50 mg by mouth daily   melatonin 3 mg  Outside Facility (Specify) Yes No   Sig: Take 3 mg by mouth daily at bedtime as needed   Patient not taking: Reported on 3/13/2025   metFORMIN (GLUCOPHAGE) 1000 MG tablet  Outside Facility (Specify) Yes No   Sig: Take 1,000 mg by mouth daily with breakfast   methenamine hippurate (HIPREX) 1 g tablet  Outside  Facility (Specify) No No   Sig: Take 1 tablet (1 g total) by mouth 2 (two) times a day with meals   mirtazapine (REMERON) 7.5 MG tablet  Outside Facility (Specify) Yes No   Sig: Take 7.5 mg by mouth daily at bedtime   pantoprazole (PROTONIX) 40 mg tablet  Outside Facility (Specify) Yes No   Sig: Take 40 mg by mouth daily   polyethylene glycol (MIRALAX) 17 g packet  Outside Facility (Specify) No No   Sig: Take 17 g by mouth 2 (two) times a day   potassium chloride (Klor-Con M10) 10 mEq tablet  Outside Facility (Specify) Yes No   Sig: Take 10 mEq by mouth 2 (two) times a day   Patient not taking: Reported on 3/13/2025   propranolol (INDERAL LA) 60 mg 24 hr capsule  Outside Facility (Specify) Yes No   Sig: Take 60 mg by mouth daily   Patient not taking: Reported on 3/13/2025   senna-docusate sodium (SENOKOT S) 8.6-50 mg per tablet  Outside Facility (Specify) No No   Sig: Take 2 tablets by mouth daily at bedtime   sitaGLIPtin (JANUVIA) 100 mg tablet  Outside Facility (Specify) Yes No   Sig: Take 100 mg by mouth daily   Patient not taking: Reported on 3/13/2025      Facility-Administered Medications: None     Current Discharge Medication List        CONTINUE these medications which have NOT CHANGED    Details   acetaminophen (TYLENOL) 325 mg tablet Take 2 tablets (650 mg total) by mouth every 6 (six) hours as needed for mild pain    Associated Diagnoses: Chronic suprapubic catheter (HCC)      albuterol (2.5 mg/3 mL) 0.083 % nebulizer solution Take 3 mL (2.5 mg total) by nebulization every 6 (six) hours as needed for wheezing or shortness of breath  Qty: 60 mL, Refills: 3    Associated Diagnoses: Shortness of breath      amLODIPine (NORVASC) 10 mg tablet Take 1 tablet (10 mg total) by mouth daily    Associated Diagnoses: Primary hypertension      atorvastatin (LIPITOR) 80 mg tablet Take 80 mg by mouth at bedtime      baclofen 10 mg tablet Take 5 mg by mouth 3 (three) times a day      bisacodyl (DULCOLAX) 10 mg suppository  Insert 1 suppository (10 mg total) into the rectum daily as needed for constipation for up to 12 doses  Qty: 12 suppository, Refills: 0    Associated Diagnoses: Constipation, unspecified constipation type      budesonide-formoterol (SYMBICORT) 160-4.5 mcg/act inhaler Inhale 2 puffs 2 (two) times a day Rinse mouth after use.      clopidogrel (PLAVIX) 75 mg tablet Take 1 tablet (75 mg total) by mouth daily  Refills: 0    Associated Diagnoses: Chronic suprapubic catheter (HCC); Neurogenic bladder; Cellulitis      cromolyn (NASALCHROM) 5.2 MG/ACT nasal spray 1 spray into each nostril 3 (three) times a day  Qty: 13 mL, Refills: 0    Associated Diagnoses: Cough; Symptoms of upper respiratory infection (URI)      cyanocobalamin (VITAMIN B-12) 500 MCG tablet Take 1 tablet (500 mcg total) by mouth daily    Associated Diagnoses: Vitamin B deficiency      Empagliflozin (JARDIANCE) 10 MG TABS tablet Take 10 mg by mouth every morning      Ergocalciferol (VITAMIN D2 PO) Take 50,000 Units by mouth once a week      furosemide (LASIX) 40 mg tablet Take 40 mg by mouth daily      !! gabapentin (NEURONTIN) 300 mg capsule Take 1 capsule (300 mg total) by mouth 3 (three) times a day  Qty: 30 capsule, Refills: 0    Associated Diagnoses: Multiple sclerosis (HCC)      !! gabapentin (NEURONTIN) 300 mg capsule Take 2 capsules (600 mg total) by mouth daily at bedtime  Qty: 30 capsule, Refills: 0    Associated Diagnoses: Multiple sclerosis (HCC)      latanoprost (XALATAN) 0.005 % ophthalmic solution Administer 1 drop to both eyes daily at bedtime      losartan (COZAAR) 50 mg tablet Take 50 mg by mouth daily      melatonin 3 mg Take 3 mg by mouth daily at bedtime as needed      metFORMIN (GLUCOPHAGE) 1000 MG tablet Take 1,000 mg by mouth daily with breakfast      methenamine hippurate (HIPREX) 1 g tablet Take 1 tablet (1 g total) by mouth 2 (two) times a day with meals  Qty: 180 tablet, Refills: 3    Associated Diagnoses: Chronic suprapubic  catheter (HCC); Cystitis      mirtazapine (REMERON) 7.5 MG tablet Take 7.5 mg by mouth daily at bedtime      pantoprazole (PROTONIX) 40 mg tablet Take 40 mg by mouth daily      polyethylene glycol (MIRALAX) 17 g packet Take 17 g by mouth 2 (two) times a day  Qty: 60 each, Refills: 0    Associated Diagnoses: Stercoral colitis      potassium chloride (Klor-Con M10) 10 mEq tablet Take 10 mEq by mouth 2 (two) times a day      propranolol (INDERAL LA) 60 mg 24 hr capsule Take 60 mg by mouth daily      senna-docusate sodium (SENOKOT S) 8.6-50 mg per tablet Take 2 tablets by mouth daily at bedtime  Qty: 30 tablet, Refills: 0    Associated Diagnoses: Constipation, unspecified constipation type      sitaGLIPtin (JANUVIA) 100 mg tablet Take 100 mg by mouth daily       !! - Potential duplicate medications found. Please discuss with provider.        No discharge procedures on file.  ED SEPSIS DOCUMENTATION   Time reflects when diagnosis was documented in both MDM as applicable and the Disposition within this note       Time User Action Codes Description Comment    5/12/2025  4:07 PM Zeeshan Alicia [A41.9,  R65.20] Severe sepsis (HCC)     5/12/2025  5:53 PM Zeeshan Alicia [J18.9] Pneumonia     5/12/2025  5:53 PM Zeeshan Alicia [N30.90] Cystitis     5/12/2025  5:53 PM Zeeshan Alicia [N17.9] ARMANDO (acute kidney injury) ([facility])            Initial Sepsis Screening       Row Name 05/12/25 6153                Is the patient's history suggestive of a new or worsening infection? Yes (Proceed)  -MM        Suspected source of infection pneumonia  -MM        Indicate SIRS criteria Tachycardia > 90 bpm;Tachypnea > 20 resp per min;Leukocytosis (WBC > 02360 IJL) OR Leukopenia (WBC <4000 IJL) OR Bandemia (WBC >10% bands)  -MM        Are two or more of the above signs & symptoms of infection both present and new to the patient? Yes (Proceed)  -MM        Assess for evidence of organ dysfunction: Are any of the below criteria  "present within 6 hours of suspected infection and SIRS criteria that are NOT considered to be chronic conditions? MAP < 65;SBP < 90;Lactate > 2.0;Creatinine > 2.0  -MM        Date of presentation of severe sepsis 05/12/25  -MM        Time of presentation of severe sepsis 1613  -MM        Date of presentation of septic shock 05/12/25  -MM        Time of presentation of septic shock 1613  -MM        Fluid Resuscitation: 30 ml/kg IV fluid bolus will be given based on actual body weight  -MM        Is the patient is persistently hypotensive in the hour after fluid bolus administration? If yes, patient meets criteria for vasopressor use. --        Sepsis Note: Click \"NEXT\" below (NOT \"close\") to generate sepsis note based on above information. YES (proceed by clicking \"NEXT\")  -                  User Key  (r) = Recorded By, (t) = Taken By, (c) = Cosigned By      Initials Name Provider Type    UNA Alicia DO Physician                       Zeeshan Alicia,   05/13/25 1504    "

## 2025-05-12 NOTE — Clinical Note
Case was discussed with LISSETTE and the patient's admission status was agreed to be Admission Status: inpatient status to the service of Dr. REGALADO .

## 2025-05-12 NOTE — ASSESSMENT & PLAN NOTE
Wt Readings from Last 3 Encounters:   02/11/25 64.1 kg (141 lb 5 oz)   01/20/25 64.1 kg (141 lb 5 oz)   12/31/24 71 kg (156 lb 8.4 oz)   Hold Lasix in the setting of sepsis

## 2025-05-12 NOTE — ASSESSMENT & PLAN NOTE
Lab Results   Component Value Date    WBC 18.68 (H) 05/12/2025    WBC 13.09 (H) 02/24/2025    WBC 10.11 02/22/2025    PROCALCITONI 0.15 05/12/2025    PROCALCITONI <0.05 02/10/2025    PROCALCITONI 0.09 01/22/2025    LACTICACID 2.1 (H) 05/12/2025    LACTICACID 3.8 (H) 05/12/2025    LACTICACID 2.1 (H) 02/11/2025         Presents with elevated white count 18 p.o.,tachycardia, tachypnea, elevated lactic acid 3.8  Likely secondary to pneumonia  CT chest abdomen pelvis with contrast showed  Retained secretions in the left mainstem bronchus and occlusion of the left lower lobe bronchus and its major branches with more distal mucous plugging in the left lower lobe  Possible aspiration pneumonia  -Marked bladder wall thickening possible cystitis  Plan  Follow blood cultures, urine cultures  Continue fluids  Trend wbc and fever curve  Her previous urine cultures resistant to ceftriaxone.  Will do Zosyn for now  Pulm consult  Urology for indwelling suprapubic Cage catheter exchange

## 2025-05-12 NOTE — ED ATTENDING ATTESTATION
5/12/2025  I, Trell Herrmann DO, saw and evaluated the patient. I have discussed the patient with the resident/non-physician practitioner and agree with the resident's/non-physician practitioner's findings, Plan of Care, and MDM as documented in the resident's/non-physician practitioner's note, except where noted. All available labs and Radiology studies were reviewed.  I was present for key portions of any procedure(s) performed by the resident/non-physician practitioner and I was immediately available to provide assistance.       At this point I agree with the current assessment done in the Emergency Department.  I have conducted an independent evaluation of this patient a history and physical is as follows:    Patient is a 74-year-old male with a history of diabetes, hypertension, MS, neurogenic bladder status post diverting pubic catheter, which per review of outpatient urology records was changed April 29, 2025.    Per EMS, staff at the facility noticed that he seemed to be less alert and more tired this morning with a productive cough.  No reported falls or trauma.    Patient right now is oriented to person only, cannot give me a good history presents for review of systems.    Per review of records the patient was hospitalized April 3 through April 7, 2025 at an outside facility initially presented for decreased mental status, less alert, thought that he may have proctitis after imaging and possible UTI based on lab studies.  Urine culture grew ,000 CFU of Serratia and E faecalis, second urine culture was negative.  Blood cultures were negative.  Initially treated with ceftriaxone, then ID recommended changing to ertapenem, and then susceptibilities ertapenem was changed to Cipro.  He was discharged back to nursing facility with improved mental status and felt to be stable.    General:  Patient appears chronically ill  Head:  Atraumatic  Eyes:  Conjunctiva pink, PERRL, he would not follow commands for  full extraocular muscle assessment but his eyes do look around the room to voice without obvious limitations.  ENT:  Mucous membranes are dry  Neck:  Supple  Cardiac:  S1-S2, without murmurs  Lungs: Diffuse rhonchi, no retractions or clinical respiratory failure  Abdomen:  Soft, nontender, normal bowel sounds, no CVA tenderness, no tympany, no rigidity, no guarding, suprapubic urinary catheter draining yellow urine  Extremities:  Normal range of motion  Neurologic:  Awake, answer to his name and say his name but will not answer other HPI questions.  When he does speak, his speech is fluent, no visible facial droop.  He withdraws equally in all 4 extremities to pain but will not follow commands to left formal strength testing or cerebellar cranial nerve testing.  Skin:  Pink warm and dry, no rash      ED Course  ED Course as of 05/12/25 1805   Mon May 12, 2025   1606 Sepsis alert called     CT chest abdomen pelvis w contrast   Final Result      1) Retained secretions in the left mainstem bronchus and occlusion of the left lower lobe bronchus and its major branches, with some more distal mucous plugging in the left lower lobe. Left lower lobe airspace opacities, new from February 2025, likely    representing aspiration pneumonia.      2) Suprapubic Cage catheter in place with marked bladder wall thickening, mucosal hyperenhancement, and perivesical fat stranding indicative of cystitis. No evidence of pyelonephritis.      3) Trace ascites around the urinary bladder, likely reactive to aforementioned cystitis. No organized collections to suggest an abscess.      4) Circumferential wall thickening in the mid and lower rectum, which may indicate proctitis.      5) No other acute abdominal or pelvic pathology.      6) Additional findings as above.                  Workstation performed: [id number]           Labs Reviewed   CBC AND DIFFERENTIAL - Abnormal       Result Value Ref Range Status    WBC 18.68 (*) 4.31 - 10.16  Thousand/uL Final    RBC 4.68  3.88 - 5.62 Million/uL Final    Hemoglobin 12.5  12.0 - 17.0 g/dL Final    Hematocrit 38.6  36.5 - 49.3 % Final    MCV 83  82 - 98 fL Final    MCH 26.7 (*) 26.8 - 34.3 pg Final    MCHC 32.4  31.4 - 37.4 g/dL Final    RDW 16.1 (*) 11.6 - 15.1 % Final    MPV 8.0 (*) 8.9 - 12.7 fL Final    Platelets 370  149 - 390 Thousands/uL Final    nRBC 0  /100 WBCs Final    Segmented % 79 (*) 43 - 75 % Final    Immature Grans % 1  0 - 2 % Final    Lymphocytes % 11 (*) 14 - 44 % Final    Monocytes % 7  4 - 12 % Final    Eosinophils Relative 2  0 - 6 % Final    Basophils Relative 0  0 - 1 % Final    Absolute Neutrophils 14.78 (*) 1.85 - 7.62 Thousands/µL Final    Absolute Immature Grans 0.14  0.00 - 0.20 Thousand/uL Final    Absolute Lymphocytes 2.05  0.60 - 4.47 Thousands/µL Final    Absolute Monocytes 1.36 (*) 0.17 - 1.22 Thousand/µL Final    Eosinophils Absolute 0.28  0.00 - 0.61 Thousand/µL Final    Basophils Absolute 0.07  0.00 - 0.10 Thousands/µL Final   COMPREHENSIVE METABOLIC PANEL - Abnormal    Sodium 136  135 - 147 mmol/L Final    Potassium 4.3  3.5 - 5.3 mmol/L Final    Chloride 103  96 - 108 mmol/L Final    CO2 21  21 - 32 mmol/L Final    ANION GAP 12  4 - 13 mmol/L Final    BUN 35 (*) 5 - 25 mg/dL Final    Creatinine 1.48 (*) 0.60 - 1.30 mg/dL Final    Comment: Standardized to IDMS reference method    Glucose 97  65 - 140 mg/dL Final    Comment: If the patient is fasting, the ADA then defines impaired fasting glucose as > 100 mg/dL and diabetes as > or equal to 123 mg/dL.    Calcium 9.1  8.4 - 10.2 mg/dL Final    Corrected Calcium 9.6  8.3 - 10.1 mg/dL Final    AST 13  13 - 39 U/L Final    ALT 8  7 - 52 U/L Final    Comment: Specimen collection should occur prior to Sulfasalazine administration due to the potential for falsely depressed results.     Alkaline Phosphatase 68  34 - 104 U/L Final    Total Protein 7.0  6.4 - 8.4 g/dL Final    Albumin 3.4 (*) 3.5 - 5.0 g/dL Final    Total  Bilirubin 0.36  0.20 - 1.00 mg/dL Final    Comment: Use of this assay is not recommended for patients undergoing treatment with eltrombopag due to the potential for falsely elevated results.  N-acetyl-p-benzoquinone imine (metabolite of Acetaminophen) will generate erroneously low results in samples for patients that have taken an overdose of Acetaminophen.    eGFR 45  ml/min/1.73sq m Final    Narrative:     National Kid[employer] Foundation guidelines for Chronic Kidney Disease (CKD):     Stage 1 with normal or high GFR (GFR > 90 mL/min/1.73 square meters)    Stage 2 Mild CKD (GFR = 60-89 mL/min/1.73 square meters)    Stage 3A Moderate CKD (GFR = 45-59 mL/min/1.73 square meters)    Stage 3B Moderate CKD (GFR = 30-44 mL/min/1.73 square meters)    Stage 4 Severe CKD (GFR = 15-29 mL/min/1.73 square meters)    Stage 5 End Stage CKD (GFR <15 mL/min/1.73 square meters)  Note: GFR calculation is accurate only with a steady state creatinine   LACTIC ACID, PLASMA (W/REFLEX IF RESULT > 2.0) - Abnormal    LACTIC ACID 3.8 (*) 0.5 - 2.0 mmol/L Final    Narrative:     Result may be elevated if tourniquet was used during collection.   PROCALCITONIN TEST - Normal    Procalcitonin 0.15  <=0.25 ng/ml Final    Comment: Suspected Lower Respiratory Tract Infection (LRTI):  - LESS than or EQUAL to 0.25 ng/mL:   low likelihood for bacterial LRTI; antibiotics DISCOURAGED.  - GREATER than 0.25 ng/mL:   increased likelihood for bacterial LRTI; antibiotics ENCOURAGED.    Suspected Sepsis:  - Strongly consider initiating antibiotics in ALL UNSTABLE patients.  - LESS than or EQUAL to 0.5 ng/mL:   low likelihood for bacterial sepsis; antibiotics DISCOURAGED.  - GREATER than 0.5 ng/mL:   increased likelihood for bacterial sepsis; antibiotics ENCOURAGED.  - GREATER than 2 ng/mL:   high risk for severe sepsis / septic shock; antibiotics strongly ENCOURAGED.    Decisions on antibiotic use should not be based solely on Procalcitonin (PCT) levels.  If PCT is low but uncertainty exists with stopping antibiotics, repeat PCT in 6-24 hours to confirm the low level. If antibiotics are administered (regardless if initial PCT was high or low), repeat PCT every 1-2 days to consider early antibiotic cessation (when GREATER than 80% decrease from the peak OR when PCT drops below designated cutoffs, whichever comes first), so long as the infection is NOT one that typically requires prolonged treatment durations (e.g., bone/joint infections, endocarditis, Staph. aureus bacteremia).    Situations of FALSE-POSITIVE Procalcitonin values:  1) Newborns < 72 hours old  2) Massive stress from severe trauma / burns, major surgery, acute pancreatitis, cardiogenic / hemorrhagic shock, sickle cell crisis, or other organ perfusion abnormalities  3) Malaria and some Candidal infections  4) Treatment with agents that stimulate cytokines (e.g., OKT3, anti-lymphocyte globulins, alemtuzumab, IL-2, granulocyte transfusion [NOT GCSFs])  5) Chronic renal disease causes elevated baseline levels (consider GREATER than 0.75 ng/mL as an abnormal cut-off); initiating HD/CRRT may cause transient decreases  6) Paraneoplastic syndromes from medullary thyroid or SCLC, some forms of vasculitis, and acute [id number] disease    Situations of FALSE-NEGATIVE Procalcitonin values:  1) Too early in clinical course for PCT to have reached its peak (may repeat in 6-24 hours to confirm low level)  2) Localized infection WITHOUT systemic (SIRS / sepsis) response (e.g., an abscess, osteomyelitis, cystitis)  3) Mycobacteria (e.g., Tuberculosis, MAC)  4) Cystic fibrosis exacerbations     PROTIME-INR - Normal    Protime 13.7  12.3 - 15.0 seconds Final    INR 1.02  0.85 - 1.19 Final    Narrative:     INR Therapeutic Range    Indication                                             INR Range      Atrial Fibrillation                                               2.0-3.0  Hypercoagulable State                                     2.0.2.3  Left Ventricular Asist Device                            2.0-3.0  Mechanical Heart Valve                                  -    Aortic(with afib, MI, embolism, HF, LA enlargement,    and/or coagulopathy)                                     2.0-3.0 (2.5-3.5)     Mitral                                                             2.5-3.5  Prosthetic/Bioprosthetic Heart Valve               2.0-3.0  Venous thromboembolism (VTE: VT, PE        2.0-3.0   APTT - Normal    PTT 25  23 - 34 seconds Final    Comment: Therapeutic Heparin Range =  60-90 seconds   BLOOD CULTURE   BLOOD CULTURE   URINE CULTURE   LACTIC ACID 2 HOUR       Patient presents at risk for pneumonia, urinary tract infection, sepsis.  Imaging suggest aspiration pneumonia, possible cystitis.  Patient initially normotensive, then became hypotensive, given IV fluids, reassessed, blood pressure returned to normal.  Sepsis alert called, patient given broad-spectrum antibiotics after blood cultures.  Case discussed with admitting medicine physician,            Critical Care Time  Procedures  Critical Care Time Statement: Upon my evaluation, this patient had a high probability of imminent or life-threatening deterioration due to sepsis, which required my direct attention, intervention, and personal management.  I spent a total of 37 minutes directly providing critical care services, including interpretation of complex medical databases, evaluating for the presence of life-threatening injuries or illnesses, complex medical decision making (to support/prevent further life-threatening deterioration)., and interpretation of hemodynamic data. This time is exclusive of procedures, teaching, treating other patients, family meetings, and any prior time recorded by providers other than myself.

## 2025-05-12 NOTE — ASSESSMENT & PLAN NOTE
Amlodipine 10 mg daily  Lasix 40 mg once daily  Losartan 50 mg once daily  Holding blood pressure meds in the setting of sepsis

## 2025-05-13 PROBLEM — J18.9 PNEUMONIA: Status: ACTIVE | Noted: 2025-05-13

## 2025-05-13 PROBLEM — N17.9 AKI (ACUTE KIDNEY INJURY) (HCC): Status: ACTIVE | Noted: 2025-05-13

## 2025-05-13 PROBLEM — J44.9 COPD (CHRONIC OBSTRUCTIVE PULMONARY DISEASE) (HCC): Status: ACTIVE | Noted: 2025-05-13

## 2025-05-13 LAB
ALBUMIN SERPL BCG-MCNC: 2.8 G/DL (ref 3.5–5)
ALP SERPL-CCNC: 57 U/L (ref 34–104)
ALT SERPL W P-5'-P-CCNC: 6 U/L (ref 7–52)
ANION GAP SERPL CALCULATED.3IONS-SCNC: 8 MMOL/L (ref 4–13)
AST SERPL W P-5'-P-CCNC: 13 U/L (ref 13–39)
BASE EX.OXY STD BLDV CALC-SCNC: 95.9 % (ref 60–80)
BASE EXCESS BLDV CALC-SCNC: -1.8 MMOL/L
BASOPHILS # BLD AUTO: 0.09 THOUSANDS/ÂΜL (ref 0–0.1)
BASOPHILS NFR BLD AUTO: 1 % (ref 0–1)
BILIRUB SERPL-MCNC: 0.38 MG/DL (ref 0.2–1)
BUN SERPL-MCNC: 21 MG/DL (ref 5–25)
CALCIUM ALBUM COR SERPL-MCNC: 9.4 MG/DL (ref 8.3–10.1)
CALCIUM SERPL-MCNC: 8.4 MG/DL (ref 8.4–10.2)
CHLORIDE SERPL-SCNC: 107 MMOL/L (ref 96–108)
CO2 SERPL-SCNC: 23 MMOL/L (ref 21–32)
CREAT SERPL-MCNC: 0.92 MG/DL (ref 0.6–1.3)
EOSINOPHIL # BLD AUTO: 0.65 THOUSAND/ÂΜL (ref 0–0.61)
EOSINOPHIL NFR BLD AUTO: 4 % (ref 0–6)
ERYTHROCYTE [DISTWIDTH] IN BLOOD BY AUTOMATED COUNT: 16.1 % (ref 11.6–15.1)
GFR SERPL CREATININE-BSD FRML MDRD: 81 ML/MIN/1.73SQ M
GLUCOSE SERPL-MCNC: 96 MG/DL (ref 65–140)
GLUCOSE SERPL-MCNC: 97 MG/DL (ref 65–140)
HCO3 BLDV-SCNC: 22.4 MMOL/L (ref 24–30)
HCT VFR BLD AUTO: 32.4 % (ref 36.5–49.3)
HGB BLD-MCNC: 10.5 G/DL (ref 12–17)
IMM GRANULOCYTES # BLD AUTO: 0.13 THOUSAND/UL (ref 0–0.2)
IMM GRANULOCYTES NFR BLD AUTO: 1 % (ref 0–2)
L PNEUMO1 AG UR QL IA.RAPID: NEGATIVE
LYMPHOCYTES # BLD AUTO: 2.7 THOUSANDS/ÂΜL (ref 0.6–4.47)
LYMPHOCYTES NFR BLD AUTO: 17 % (ref 14–44)
MAGNESIUM SERPL-MCNC: 1.7 MG/DL (ref 1.9–2.7)
MCH RBC QN AUTO: 26.6 PG (ref 26.8–34.3)
MCHC RBC AUTO-ENTMCNC: 32.4 G/DL (ref 31.4–37.4)
MCV RBC AUTO: 82 FL (ref 82–98)
MONOCYTES # BLD AUTO: 1.3 THOUSAND/ÂΜL (ref 0.17–1.22)
MONOCYTES NFR BLD AUTO: 8 % (ref 4–12)
NEUTROPHILS # BLD AUTO: 10.7 THOUSANDS/ÂΜL (ref 1.85–7.62)
NEUTS SEG NFR BLD AUTO: 69 % (ref 43–75)
NRBC BLD AUTO-RTO: 0 /100 WBCS
O2 CT BLDV-SCNC: 16.9 ML/DL
PCO2 BLDV: 36.3 MM HG (ref 42–50)
PH BLDV: 7.41 [PH] (ref 7.3–7.4)
PLATELET # BLD AUTO: 329 THOUSANDS/UL (ref 149–390)
PMV BLD AUTO: 8 FL (ref 8.9–12.7)
PO2 BLDV: 104.1 MM HG (ref 35–45)
POTASSIUM SERPL-SCNC: 3.7 MMOL/L (ref 3.5–5.3)
PROT SERPL-MCNC: 5.9 G/DL (ref 6.4–8.4)
RBC # BLD AUTO: 3.95 MILLION/UL (ref 3.88–5.62)
S PNEUM AG UR QL: NEGATIVE
SODIUM SERPL-SCNC: 138 MMOL/L (ref 135–147)
WBC # BLD AUTO: 15.57 THOUSAND/UL (ref 4.31–10.16)

## 2025-05-13 PROCEDURE — 94760 N-INVAS EAR/PLS OXIMETRY 1: CPT

## 2025-05-13 PROCEDURE — 82948 REAGENT STRIP/BLOOD GLUCOSE: CPT

## 2025-05-13 PROCEDURE — 82805 BLOOD GASES W/O2 SATURATION: CPT

## 2025-05-13 PROCEDURE — 87449 NOS EACH ORGANISM AG IA: CPT

## 2025-05-13 PROCEDURE — 94640 AIRWAY INHALATION TREATMENT: CPT

## 2025-05-13 PROCEDURE — 94668 MNPJ CHEST WALL SBSQ: CPT

## 2025-05-13 PROCEDURE — 99232 SBSQ HOSP IP/OBS MODERATE 35: CPT | Performed by: STUDENT IN AN ORGANIZED HEALTH CARE EDUCATION/TRAINING PROGRAM

## 2025-05-13 PROCEDURE — 99223 1ST HOSP IP/OBS HIGH 75: CPT | Performed by: INTERNAL MEDICINE

## 2025-05-13 PROCEDURE — 85025 COMPLETE CBC W/AUTO DIFF WBC: CPT

## 2025-05-13 PROCEDURE — 94664 DEMO&/EVAL PT USE INHALER: CPT

## 2025-05-13 PROCEDURE — 92610 EVALUATE SWALLOWING FUNCTION: CPT

## 2025-05-13 PROCEDURE — 83735 ASSAY OF MAGNESIUM: CPT

## 2025-05-13 PROCEDURE — 87081 CULTURE SCREEN ONLY: CPT

## 2025-05-13 PROCEDURE — 80053 COMPREHEN METABOLIC PANEL: CPT

## 2025-05-13 RX ORDER — MAGNESIUM SULFATE HEPTAHYDRATE 40 MG/ML
2 INJECTION, SOLUTION INTRAVENOUS ONCE
Status: COMPLETED | OUTPATIENT
Start: 2025-05-13 | End: 2025-05-13

## 2025-05-13 RX ORDER — ALBUTEROL SULFATE 90 UG/1
2 INHALANT RESPIRATORY (INHALATION) EVERY 6 HOURS PRN
Status: DISCONTINUED | OUTPATIENT
Start: 2025-05-13 | End: 2025-05-21 | Stop reason: HOSPADM

## 2025-05-13 RX ORDER — LEVALBUTEROL INHALATION SOLUTION 1.25 MG/3ML
1.25 SOLUTION RESPIRATORY (INHALATION)
Status: DISCONTINUED | OUTPATIENT
Start: 2025-05-13 | End: 2025-05-18

## 2025-05-13 RX ORDER — ASPIRIN 81 MG/1
81 TABLET ORAL DAILY
Status: DISCONTINUED | OUTPATIENT
Start: 2025-05-14 | End: 2025-05-14

## 2025-05-13 RX ORDER — LEVALBUTEROL INHALATION SOLUTION 0.63 MG/3ML
0.31 SOLUTION RESPIRATORY (INHALATION)
Status: DISCONTINUED | OUTPATIENT
Start: 2025-05-13 | End: 2025-05-13

## 2025-05-13 RX ORDER — ENOXAPARIN SODIUM 100 MG/ML
40 INJECTION SUBCUTANEOUS
Status: DISCONTINUED | OUTPATIENT
Start: 2025-05-13 | End: 2025-05-21 | Stop reason: HOSPADM

## 2025-05-13 RX ADMIN — MIRTAZAPINE 7.5 MG: 15 TABLET, FILM COATED ORAL at 21:01

## 2025-05-13 RX ADMIN — ENOXAPARIN SODIUM 40 MG: 40 INJECTION SUBCUTANEOUS at 13:49

## 2025-05-13 RX ADMIN — SODIUM CHLORIDE 200 MG: 9 INJECTION, SOLUTION INTRAVENOUS at 16:24

## 2025-05-13 RX ADMIN — PIPERACILLIN AND TAZOBACTAM 4.5 G: 36; 4.5 INJECTION, POWDER, FOR SOLUTION INTRAVENOUS at 05:41

## 2025-05-13 RX ADMIN — IPRATROPIUM BROMIDE 0.5 MG: 0.5 SOLUTION RESPIRATORY (INHALATION) at 19:12

## 2025-05-13 RX ADMIN — PIPERACILLIN AND TAZOBACTAM 4.5 G: 36; 4.5 INJECTION, POWDER, FOR SOLUTION INTRAVENOUS at 21:34

## 2025-05-13 RX ADMIN — BACLOFEN 5 MG: 10 TABLET ORAL at 16:22

## 2025-05-13 RX ADMIN — BUDESONIDE AND FORMOTEROL FUMARATE DIHYDRATE 2 PUFF: 160; 4.5 AEROSOL RESPIRATORY (INHALATION) at 08:40

## 2025-05-13 RX ADMIN — ATORVASTATIN CALCIUM 80 MG: 80 TABLET, FILM COATED ORAL at 16:22

## 2025-05-13 RX ADMIN — MAGNESIUM SULFATE HEPTAHYDRATE 2 G: 40 INJECTION, SOLUTION INTRAVENOUS at 09:57

## 2025-05-13 RX ADMIN — IPRATROPIUM BROMIDE 0.5 MG: 0.5 SOLUTION RESPIRATORY (INHALATION) at 14:37

## 2025-05-13 RX ADMIN — GABAPENTIN 300 MG: 300 CAPSULE ORAL at 16:22

## 2025-05-13 RX ADMIN — GABAPENTIN 300 MG: 300 CAPSULE ORAL at 21:01

## 2025-05-13 RX ADMIN — PANTOPRAZOLE SODIUM 40 MG: 40 TABLET, DELAYED RELEASE ORAL at 08:40

## 2025-05-13 RX ADMIN — LEVALBUTEROL HYDROCHLORIDE 0.31 MG: 0.63 SOLUTION RESPIRATORY (INHALATION) at 14:37

## 2025-05-13 RX ADMIN — BACLOFEN 5 MG: 10 TABLET ORAL at 08:40

## 2025-05-13 RX ADMIN — LEVALBUTEROL HYDROCHLORIDE 1.25 MG: 1.25 SOLUTION RESPIRATORY (INHALATION) at 19:12

## 2025-05-13 RX ADMIN — GABAPENTIN 300 MG: 300 CAPSULE ORAL at 08:40

## 2025-05-13 RX ADMIN — BACLOFEN 5 MG: 10 TABLET ORAL at 21:01

## 2025-05-13 RX ADMIN — PIPERACILLIN AND TAZOBACTAM 4.5 G: 36; 4.5 INJECTION, POWDER, FOR SOLUTION INTRAVENOUS at 15:00

## 2025-05-13 RX ADMIN — CLOPIDOGREL BISULFATE 75 MG: 75 TABLET, FILM COATED ORAL at 08:40

## 2025-05-13 NOTE — ASSESSMENT & PLAN NOTE
Lab Results   Component Value Date    HGBA1C 6.3 (H) 02/10/2025       Recent Labs     05/13/25  0751   POCGLU 97       Blood Sugar Average: Last 72 hrs:  (P) 97Home medications: Empagliflozin, Januvia, Flonase 1000 mg daily,  Sliding scale  Diabetic diet  Goal blood glucose 140-180

## 2025-05-13 NOTE — ASSESSMENT & PLAN NOTE
CT CAP with retained secretions in the left mainstem bronchus and occlusion of the left lower lobe bronchus with more distal mucous plugging in the left lower lobe concerning for aspiration pneumonia. Started on Zosyn due to previous urine cultures having resistance as cocnern for both pneumonia and urinary infection  Continue antibiotics for 5 days for pneumonia  Recommend speech evaluation to determine if patient is aspirating  Flutter valve ordered to help patients with retained secretions  Would recommend discontinuing symbicort as this can increase risk of pneumonia and his COPD appears mild.

## 2025-05-13 NOTE — PROGRESS NOTES
Progress Note - Hospitalist   Name: Mathew Rico 74 y.o. male I MRN: [id number]  [facility] I Date of Admission: 5/12/2025   Date of Service: 5/13/2025 I Hospital Day: 1     Assessment & Plan  Sepsis ([facility])  Lab Results   Component Value Date    WBC 15.57 (H) 05/13/2025    WBC 18.68 (H) 05/12/2025    WBC 13.09 (H) 02/24/2025    PROCALCITONI 0.15 05/12/2025    PROCALCITONI <0.05 02/10/2025    PROCALCITONI 0.09 01/22/2025    LACTICACID 2.1 (H) 05/12/2025    LACTICACID 3.8 (H) 05/12/2025    LACTICACID 2.1 (H) 02/11/2025   74-year-old male with past medical history of hypertension, hyperlipidemia and diabetes, COPD, CVA, CHF, MS with neurogenic bladder and chronic Cage presenting with change in mental status And cough.  Upon admission labs were significant for elevated white count 18 p.o.,tachycardia, tachypnea, elevated lactic acid 3.8.  CT chest abdomen pelvis with contrast 05/12/2025: Possible aspiration pneumonia, Marked bladder wall thickening possible cystitis  Patient has had multiple admissions for UTI 2/2 multi-drug resisatent organism. Was treated with Ertapenem during last admission at [facility] on 04/03, prior to that it was on 02/10.   Procalcitonin: Normal  S/p 2 L of IV fluid bolus and maintenance fluid    Pending blood cultures, urine cultures  Most likely aspiration pneumonia versus UTI(given extensive history of prior UTIs and likely drug-resistant organism)  Started on Zosyn 4.5 g every 8 hours for 5 days  Urology was consulted for catheter exchange: No catheter exchange during this time.    Plan  Follow blood cultures, urine cultures  Trend wbc and fever curve  Continue IV Zosyn 4 g every 8 hours      Type 2 diabetes mellitus without complication, without long-term current use of insulin ([facility])  Lab Results   Component Value Date    HGBA1C 6.3 (H) 02/10/2025       Recent Labs     05/13/25  0751   POCGLU 97       Blood Sugar Average: Last 72 hrs:  (P) 97Home medications: Empagliflozin,  Januvia, Flonase 1000 mg daily,  Sliding scale  Diabetic diet  Goal blood glucose 140-180    Primary hypertension  Amlodipine 10 mg daily  Lasix 40 mg once daily  Losartan 50 mg once daily  Holding blood pressure meds in the setting of sepsis    GERSON on CPAP  CPAP at night  Will get VBG; concern for hypercapnia  Might benefit from Biapa  Hyperlipidemia  Patient takes atorvastatin 80 mg daily at bedtime  Chronic diastolic congestive heart failure (HCC)  Wt Readings from Last 3 Encounters:   05/12/25 65 kg (143 lb 6.4 oz)   02/11/25 64.1 kg (141 lb 5 oz)   01/20/25 64.1 kg (141 lb 5 oz)   Hold Lasix in the setting of sepsis            ARMANDO (acute kidney injury) ([facility])  Resolved  COPD (chronic obstructive pulmonary disease) ([facility])  Home meds: Symbicort twice daily  As per pulmonology: Started on Xopenox and atrovent.  Stopped Symbicort as it could complicate pneumonia    VTE Pharmacologic Prophylaxis: VTE Score: 6 High Risk (Score >/= 5) - Pharmacological DVT Prophylaxis Ordered: enoxaparin (Lovenox). Sequential Compression Devices Ordered.    Mobility:   Basic Mobility Inpatient Raw Score: 12  JH-HLM Goal: 4: Move to chair/commode  JH-HLM Achieved: 1: Laying in bed  JH-HLM Goal NOT achieved. Continue with multidisciplinary rounding and encourage appropriate mobility to improve upon JH-HLM goals.    Patient Centered Rounds: I performed bedside rounds with nursing staff today.   Discussions with Specialists or Other Care Team Provider: Pulmonology    Education and Discussions with Family / Patient: Attempted to update  (brother) via phone. Unable to contact.    Current Length of Stay: 1 day(s)  Current Patient Status: Inpatient   Certification Statement: The patient will continue to require additional inpatient hospital stay due to Pneumonia management  Discharge Plan: Anticipate discharge in 48-72 hrs to discharge location to be determined pending rehab evaluations.    Code Status: Level 1 - Full  Code    Subjective   Patient seen at bedside. He could tell me his name but he was complaining of his bed being too low and couldn't really explain to me what he means by that.     Objective :  Temp:  [98 °F (36.7 °C)-99.8 °F (37.7 °C)] 98.7 °F (37.1 °C)  HR:  [] 77  BP: ()/(44-78) 113/60  Resp:  [16-23] 16  SpO2:  [92 %-98 %] 95 %  O2 Device: None (Room air)  Nasal Cannula O2 Flow Rate (L/min):  [2 L/min] 2 L/min    Body mass index is 24.61 kg/m².     Input and Output Summary (last 24 hours):     Intake/Output Summary (Last 24 hours) at 5/13/2025 0925  Last data filed at 5/13/2025 0214  Gross per 24 hour   Intake 3050 ml   Output 1100 ml   Net 1950 ml       Physical Exam  HENT:      Head: Normocephalic and atraumatic.      Mouth/Throat:      Mouth: Mucous membranes are moist.   Cardiovascular:      Rate and Rhythm: Normal rate and regular rhythm.      Pulses: Normal pulses.      Heart sounds: Normal heart sounds.   Pulmonary:      Effort: Pulmonary effort is normal.      Breath sounds: Normal breath sounds.   Abdominal:      Palpations: Abdomen is soft.   Musculoskeletal:      Right lower leg: No edema.      Left lower leg: No edema.   Skin:     General: Skin is warm.   Neurological:      Mental Status: He is alert.      Comments: Oriented x2           Lines/Drains:              Lab Results: I have reviewed the following results:   Results from last 7 days   Lab Units 05/13/25  0543   WBC Thousand/uL 15.57*   HEMOGLOBIN g/dL 10.5*   HEMATOCRIT % 32.4*   PLATELETS Thousands/uL 329   SEGS PCT % 69   LYMPHO PCT % 17   MONO PCT % 8   EOS PCT % 4     Results from last 7 days   Lab Units 05/13/25  0543   SODIUM mmol/L 138   POTASSIUM mmol/L 3.7   CHLORIDE mmol/L 107   CO2 mmol/L 23   BUN mg/dL 21   CREATININE mg/dL 0.92   ANION GAP mmol/L 8   CALCIUM mg/dL 8.4   ALBUMIN g/dL 2.8*   TOTAL BILIRUBIN mg/dL 0.38   ALK PHOS U/L 57   ALT U/L 6*   AST U/L 13   GLUCOSE RANDOM mg/dL 96     Results from last 7 days    Lab Units 05/12/25  1622   INR  1.02     Results from last 7 days   Lab Units 05/13/25  0751   POC GLUCOSE mg/dl 97         Results from last 7 days   Lab Units 05/12/25  1755 05/12/25  1533   LACTIC ACID mmol/L 2.1* 3.8*   PROCALCITONIN ng/ml  --  0.15       Recent Cultures (last 7 days):   Results from last 7 days   Lab Units 05/12/25  1538 05/12/25  1533   BLOOD CULTURE  Received in Microbiology Lab. Culture in Progress. Received in Microbiology Lab. Culture in Progress.             Last 24 Hours Medication List:     Current Facility-Administered Medications:     acetaminophen (TYLENOL) tablet 650 mg, Q6H PRN    albuterol (PROVENTIL HFA,VENTOLIN HFA) inhaler 2 puff, Q6H PRN    [Held by provider] amLODIPine (NORVASC) tablet 10 mg, Daily    atorvastatin (LIPITOR) tablet 80 mg, Daily With Dinner    baclofen tablet 5 mg, TID    budesonide-formoterol (SYMBICORT) 160-4.5 mcg/act inhaler 2 puff, BID    clopidogrel (PLAVIX) tablet 75 mg, Daily    [Held by provider] furosemide (LASIX) tablet 40 mg, Daily    gabapentin (NEURONTIN) capsule 300 mg, TID    [Held by provider] losartan (COZAAR) tablet 50 mg, Daily    magnesium sulfate 2 g/50 mL IVPB (premix) 2 g, Once    mirtazapine (REMERON) tablet 7.5 mg, HS    pantoprazole (PROTONIX) EC tablet 40 mg, Daily    [COMPLETED] piperacillin-tazobactam (ZOSYN) 4.5 g in sodium chloride 0.9 % 100 mL IV LOADING DOSE, Once, Last Rate: 4.5 g (05/12/25 1917) **FOLLOWED BY** piperacillin-tazobactam (ZOSYN) 4.5 g in sodium chloride 0.9 % 100 mL IVPB (EXTENDED INFUSION), Q8H, Last Rate: 4.5 g (05/13/25 0541)    Administrative Statements   Today, Patient Was Seen By: Nadia Galvan MD      **Please Note: This note may have been constructed using a voice recognition system.**

## 2025-05-13 NOTE — PLAN OF CARE
Problem: Potential for Falls  Goal: Patient will remain free of falls  Description: INTERVENTIONS:- Educate patient/family on patient safety including physical limitations- Instruct patient to call for assistance with activity - Consult OT/PT to assist with strengthening/mobility - Keep Call bell within reach- Keep bed low and locked with side rails adjusted as appropriate- Keep care items and personal belongings within reach- Initiate and   Outcome: Progressing

## 2025-05-13 NOTE — ASSESSMENT & PLAN NOTE
Suspect secondary to pneumonia in the setting of likely aspiration versus UTI. CT CAP with retained secretions in the left mainstem bronchus and occlusion of the left lower lobe bronchus with more distal mucous plugging in the left lower lobe concerning for aspiration pneumonia. Bladder wall thickening.   Started on Zosyn due to previous urine cultures having resistance  Continue antibiotics for 5 days for pneumonia

## 2025-05-13 NOTE — PLAN OF CARE
Problem: Prexisting or High Potential for Compromised Skin Integrity  Goal: Skin integrity is maintained or improved  Description: INTERVENTIONS:- Identify patients at risk for skin breakdown- Assess and monitor skin integrity- Assess and monitor nutrition and hydration status- Monitor labs - Assess for incontinence - Turn and reposition patient- Assist with mobility/ambulation- Relieve pressure over bony prominences- Avoid friction and shearing- Provide appropriate hygiene as needed including keeping skin clean and dry- Evaluate need for skin moisturizer/barrier cream- Collaborate with interdisciplinary team - Patient/family teaching- Consider wound care consult   Outcome: Progressing

## 2025-05-13 NOTE — CONSULTS
Consultation - Pulmonology   Name: Mathew Rico 74 y.o. male I MRN: [id number]  Unit/Bed#: [facility] 820-01 I Date of Admission: 5/12/2025   Date of Service: 5/13/2025 I Hospital Day: 1       Inpatient consult to Pulmonology     Date/Time  5/13/2025 11:20 AM     Performed by  Ally Morillo MD   Authorized by  Alfredo Abdi MD           Physician Requesting Evaluation: Kenia Basurto DO   Reason for Evaluation / Principal Problem: Pneumonia    Assessment & Plan  Sepsis ([facility])  Suspect secondary to pneumonia in the setting of likely aspiration versus UTI. CT CAP with retained secretions in the left mainstem bronchus and occlusion of the left lower lobe bronchus with more distal mucous plugging in the left lower lobe concerning for aspiration pneumonia. Bladder wall thickening.   Started on Zosyn due to previous urine cultures having resistance  Continue antibiotics for 5 days for pneumonia  Pneumonia  CT CAP with retained secretions in the left mainstem bronchus and occlusion of the left lower lobe bronchus with more distal mucous plugging in the left lower lobe concerning for aspiration pneumonia. Started on Zosyn due to previous urine cultures having resistance as cocnern for both pneumonia and urinary infection  Continue antibiotics for 5 days for pneumonia  Recommend speech evaluation to determine if patient is aspirating  Flutter valve ordered to help patients with retained secretions  Would recommend discontinuing symbicort as this can increase risk of pneumonia and his COPD appears mild.   COPD (chronic obstructive pulmonary disease) ([facility])  PFT 9/26/2023 No obstruction on spirometry. Normal DLCO. Has mild emphysema on CT. Former smoker quit over 30 years ago. Currently on symbicort twice daily  No signs of exacerbation  Nebs as needed inpatient  Would recommend discontinuing symbicort as this can increase risk of pneumonia and his COPD appears mild.   Can start spiriva if needed  GERSON on CPAP  On  cpap nightly. No sleep study on file.  Check VBG now  Start BiPAP if hypercapnic  Multiple sclerosis (HCC)  Concern for neuromuscular weakness due to MS.  Recommend PFTs with MIP/MEP as outpatient  I have discussed the above management plan in detail with the primary service.   Pulmonology service will follow.    History of Present Illness   Mathew Rico is a 74 y.o. male who presents with history of hypertension, hyperlipidemia, congestive heart failure, obstructive sleep apnea on CPAP, diabetes, MS and neurogenic bladder s/p diverting pubic catheter who presented to [facility] on 5/12/2025 for altered mental status and cough.  Patient presented from nursing facility.  Per report, staff at the facility noted the patient seemed to be less alert and more tired with a productive cough.  Patient found to have leukocytosis, tachycardia, tachypnea and elevated lactic acid of 3.8.  CT chest abdomen pelvis obtained which revealed retained secretions in the left mainstem bronchus and occlusion of the left lower lobe bronchus with more distal mucous plugging in the left lower lobe concerning for aspiration pneumonia.  He also had marked bladder wall thickening concerning for possible cystitis.  He was initiated on Zosyn to cover pneumonia and possible urinary infection.  Pulmonary consulted for further recommendations.    Review of Systems   Unable to perform ROS: Mental status change       Historical Information   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HCC)     Bladder infection     Bronchitis     Constipation     Cough     Diabetes (HCC)     Diabetes mellitus (HCC)     Dizziness     Dysfunction of eustachian tube     Erectile dysfunction of non-organic origin     Fatigue     Glaucoma     Hiatal hernia     Hypertension     Imbalance     Leg muscle spasm      Leukocytosis 2024    MS (multiple sclerosis) (HCC)     Multiple sclerosis (HCC)     Nausea and vomiting 2025    Nephrolithiasis     Neurogenic bladder     No natural teeth     Sinus pain     Spinal stenosis     Strain of thoracic region     Stroke (HCC)     Suprapubic catheter (HCC)      Past Surgical History:   Procedure Laterality Date    APPENDECTOMY      BRAIN SURGERY      Coil placed in aneurysm    CEREBRAL ANEURYSM REPAIR      CYSTOSCOPY      CYSTOSCOPY      CYSTOSCOPY  2018    CYSTOSCOPY  01/15/2021    EYE SURGERY      transscleral cyclophotocoagulation noncontact YAG laser    IR SUPRAPUBIC CATHETER CHECK/CHANGE/REINSERTION/UPSIZE  3/28/2024    MYRINGOTOMY      with ventilation tube insertion    NJ LITHOLAPAXY SMPL/SM <2.5 CM N/A 2019    Procedure: CYSTOSCOPY, holmium laser litholapaxy of bladder stones, EXCHANGE OF SP TUBE;  Surgeon: Javy Jeffries MD;  Location: [facility];  Service: Urology    SUPRAPUBIC CATHETER INSERTION       Social History     Tobacco Use    Smoking status: Former     Current packs/day: 0.00     Average packs/day: 0.5 packs/day for 31.0 years (15.5 ttl pk-yrs)     Types: Cigarettes     Start date:      Quit date:      Years since quittin.3    Smokeless tobacco: Never   Vaping Use    Vaping status: Never Used   Substance and Sexual Activity    Alcohol use: Not Currently    Drug use: No    Sexual activity: Not Currently     E-Cigarette/Vaping    E-Cigarette Use Never User      E-Cigarette/Vaping Substances    Nicotine No     THC No     CBD No     Flavoring No     Other No     Unknown No      Family History   Problem Relation Age of Onset    Heart attack Mother     Stroke Mother     Heart attack Father     Anuerysm Father         In Stomach     Aneurysm Father      Objective :  Temp:  [98 °F (36.7 °C)-99.8 °F (37.7 °C)] 98.7 °F (37.1 °C)  HR:  [] 77  BP: ()/(44-78) 113/60  Resp:  [16-23] 16  SpO2:  [92 %-98 %] 95 %  O2 Device: None (Room  air)  Nasal Cannula O2 Flow Rate (L/min):  [2 L/min] 2 L/min    Physical Exam  Constitutional:       General: He is not in acute distress.     Appearance: He is ill-appearing.   HENT:      Head: Normocephalic and atraumatic.   Eyes:      Pupils: Pupils are equal, round, and reactive to light.   Cardiovascular:      Rate and Rhythm: Normal rate and regular rhythm.   Pulmonary:      Effort: Pulmonary effort is normal.      Breath sounds: Rales present.   Abdominal:      Tenderness: There is abdominal tenderness (suprapubic area).   Musculoskeletal:      Cervical back: Normal range of motion.   Neurological:      Mental Status: He is alert.      Comments: Oriented to self.    Psychiatric:      Comments: Unable to assess           Lab Results: I have reviewed the following results:  .     05/12/25  1622 05/12/25  1755 05/13/25  0543   WBC  --   --  15.57*   HGB  --   --  10.5*   HCT  --   --  32.4*   PLT  --   --  329   SODIUM  --   --  138   K  --   --  3.7   CL  --   --  107   CO2  --   --  23   BUN  --   --  21   CREATININE  --   --  0.92   GLUC  --   --  96   MG  --   --  1.7*   AST  --   --  13   ALT  --   --  6*   ALB  --   --  2.8*   TBILI  --   --  0.38   ALKPHOS  --   --  57   PTT 25  --   --    INR 1.02  --   --    LACTICACID  --  2.1*  --      ABG: No new results in last 24 hours.    Imaging Results Review: I reviewed radiology reports from this admission including: chest xray and CT chest.    CT chest 5/12/2025: Retained secretions in the left mainstem bronchus and occlusion of the left lower lobe bronchus and its major branches, with some more distal mucous plugging in the left lower lobe. Left lower lobe airspace opacities, new from February 2025, likely representing aspiration pneumonia.     2) Suprapubic Cage catheter in place with marked bladder wall thickening, mucosal hyperenhancement, and perivesical fat stranding indicative of cystitis. No evidence of pyelonephritis.     3) Trace ascites around the  urinary bladder, likely reactive to aforementioned cystitis. No organized collections to suggest an abscess.     4) Circumferential wall thickening in the mid and lower rectum, which may indicate proctitis.     5) No other acute abdominal or pelvic pathology.     6) Additional findings as above.       Other Study Results Review: EKG was reviewed.   PFT Results Reviewed: PFT 9/26/2023 No obstruction on spirometry. Normal DLCO.    VTE Prophylaxis: VTE covered by:    None       Ally Morillo MD  Pulmonary & Critical Care Medicine Fellow PGY-4  [facility]

## 2025-05-13 NOTE — PROGRESS NOTES
Patient:  FELIPE LARISO    MRN:  [id number]    Aidin Request ID:  [id number]    Level of care reserved:  Skilled Nursing Facility    Partner Reserved:  [facility], [place] [address] [phone]    Clinical needs requested:    Geography searched:  10 miles around [id number]    Start of Service:    Request sent:  12:55pm EDT on 5/13/2025 by Zaida Wong    Partner reserved:  1:09pm EDT on 5/13/2025 by Zaida Wong    Choice list shared:  1:09pm EDT on 5/13/2025 by Zaida Wong

## 2025-05-13 NOTE — ASSESSMENT & PLAN NOTE
Lab Results   Component Value Date    WBC 15.57 (H) 05/13/2025    WBC 18.68 (H) 05/12/2025    WBC 13.09 (H) 02/24/2025    PROCALCITONI 0.15 05/12/2025    PROCALCITONI <0.05 02/10/2025    PROCALCITONI 0.09 01/22/2025    LACTICACID 2.1 (H) 05/12/2025    LACTICACID 3.8 (H) 05/12/2025    LACTICACID 2.1 (H) 02/11/2025   74-year-old male with past medical history of hypertension, hyperlipidemia and diabetes, COPD, CVA, CHF, MS with neurogenic bladder and chronic Cage presenting with change in mental status And cough.  Upon admission labs were significant for elevated white count 18 p.o.,tachycardia, tachypnea, elevated lactic acid 3.8.  CT chest abdomen pelvis with contrast 05/12/2025: Possible aspiration pneumonia, Marked bladder wall thickening possible cystitis  Patient has had multiple admissions for UTI 2/2 multi-drug resisatent organism. Was treated with Ertapenem during last admission at [facility] on 04/03, prior to that it was on 02/10.   Procalcitonin: Normal  S/p 2 L of IV fluid bolus and maintenance fluid    Pending blood cultures, urine cultures  Most likely aspiration pneumonia versus UTI(given extensive history of prior UTIs and likely drug-resistant organism)  Started on Zosyn 4.5 g every 8 hours for 5 days  Urology was consulted for catheter exchange: No catheter exchange during this time.    Plan  Follow blood cultures, urine cultures  Trend wbc and fever curve  Continue IV Zosyn 4 g every 8 hours

## 2025-05-13 NOTE — ASSESSMENT & PLAN NOTE
HOSPITALIST DISCHARGE INSTRUCTIONS    NAME: Aleksey Enrique   :  1973   MRN:  [id number]     Date/Time:  2020 2:04 PM    ADMIT DATE: 2020   DISCHARGE DATE: 2020     Acute on chronic hypoxic and hypercapnic respiratory failure/COPD exacerbation  Acute on chronic diastolic heart failure  Super morbid obesity  OHS,  Chronic systolic HF  HTN  CAD  [id number]      · It is important that you take the medication exactly as they are prescribed. · Keep your medication in the bottles provided by the pharmacist and keep a list of the medication names, dosages, and times to be taken in your wallet. · Do not take other medications without consulting your doctor. What to do at [address]:  Cardiac Diet    Recommended activity: Activity as tolerated      If you have questions regarding the hospital related prescriptions or hospital related issues please call [facility] at [phone]. You can always direct your questions to your primary care doctor if you are unable to reach your hospital physician; your PCP works as an extension of your hospital doctor just like your hospital doctor is an extension of your PCP for your time at the hospital [facility])    If you experience any of the following symptoms then please call your primary care physician or return to the emergency room if you cannot get hold of your doctor:    Fever, chills, nausea, vomiting, or persistent diarrhea  Worsening weakness or new problems with your speech or balance  Dark stools or visible blood in your stools  New Leg swelling or shortness of breath as these could be signs of a clot    Additional Instructions:      Bring these papers with you to your follow up appointments. The papers will help your doctors be sure to continue the care plan from the hospital.              Information obtained by :  I understand that if any problems occur once I am at home I am to contact my physician.     I Wt Readings from Last 3 Encounters:   05/12/25 65 kg (143 lb 6.4 oz)   02/11/25 64.1 kg (141 lb 5 oz)   01/20/25 64.1 kg (141 lb 5 oz)   Hold Lasix in the setting of sepsis             understand and acknowledge receipt of the instructions indicated above.                                                                                                                                            Physician's or R.N.'s Signature                                                                  Date/Time                                                                                                                                              Patient or Representative Signature

## 2025-05-13 NOTE — CASE MANAGEMENT
Case Management Assessment & Discharge Planning Note    Patient name Mathew Rico  Location [facility]-01 MRN [id number]  : 1950 Date 2025       Current Admission Date: 2025  Current Admission Diagnosis:Sepsis ([facility])   Patient Active Problem List    Diagnosis Date Noted Date Diagnosed    ARMANDO (acute kidney injury) ([facility]) 2025     COPD (chronic obstructive pulmonary disease) ([facility]) 2025     Pneumonia 2025     Sepsis ([facility]) 2025     Mild protein-calorie malnutrition ([facility]) 2025     Urinary obstruction 2025     History of stroke 2024     Bladder wall thickening 2024     Pain of left hip 2024     Left leg pain 2024     Dizziness 2024     Pruritus 2024     Asymptomatic bacteriuria 2024     Blurred vision, bilateral 2024     Symptoms of upper respiratory infection (URI) 06/15/2024     Chest pain 2024     Acute encephalopathy 2024     GERSON on CPAP 2024     Fall 2024     Primary hypertension 2024     Type 2 diabetes mellitus without complication, without long-term current use of insulin ([facility]) 2024     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     Multiple sclerosis ([facility]) 2024     Urinary retention 2024     Neck pain 2024     Vitamin B deficiency 2024     Generalized weakness 2024     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     Fall 2023     Weakness 2023     Accidental fall from chair 2023     Constipation 2023     Chronic diastolic congestive heart failure ([facility]) 2023     Hyponatremia 2023     Excessive daytime sleepiness 2022     Shortness of breath 2022     Pancreatic mass 2020     Pancreatic lesion 2020     Bladder stones 2019     Bladder neck contracture 2019     History of CVA (cerebrovascular accident) 10/21/2018     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     Diabetic neuropathy  (HCC) 11/07/2016     Neurogenic bladder 05/16/2016     Thyroid nodule 09/02/2015     Cervical spinal stenosis 12/11/2014     Generalized anxiety disorder 07/13/2014     Obstructive sleep apnea 01/04/2013     Benign colon polyp 06/19/2012     Esophageal reflux 06/19/2012     Fatty liver 06/19/2012     Glaucoma 06/19/2012     Hyperlipidemia 06/19/2012     Multiple sclerosis (HCC) 06/19/2012     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) 06/19/2012     Primary hypertension 06/11/2012       LOS (days): 1  Geometric Mean LOS (GMLOS) (days): 4.9  Days to GMLOS:4.1     OBJECTIVE:    Risk of Unplanned Readmission Score: 43.9         Current admission status: Inpatient       Preferred Pharmacy:   [facility]/pharmacy #[id number] [facility] [place] - [address] [facility]  [address] [facility] [address]  Phone: [phone] Fax: [phone]    [place], [place] - [address]  [address]  [facility] [address]  Phone: [phone] Fax: [phone]    [facility] [place] - [address]  [facility],  [address]  [facility],  [facility] [address]  Phone: [phone] Fax: [phone]     [place] - [address] [place] [address]  Phone: [phone] Fax: [phone]    Primary Care Provider: Carolina Kumari MD    Primary Insurance: [facility]  Secondary Insurance:     ASSESSMENT:  Active Health Care Proxies       [facility] Rico Health Care Representative - Brother   Primary Phone: [phone] (Home)                                Patient Information  Admitted from:: Facility ([facility])  Mental Status: Confused  During Assessment patient was accompanied by: Not accompanied during assessment  Assessment information provided by:: Other - please comment ([employer] CM- Arina)  Support Systems: Self, Family members, Home care staff  County of Residence: [place]  What city do you live in?:  [place]  Home entry access options. Select all that apply.: No steps to enter home  Type of Current Residence: Facility  Upon entering residence, is there a bedroom on the main floor (no further steps)?: Yes  Upon entering residence, is there a bathroom on the main floor (no further steps)?: Yes  Living Arrangements: Lives Alone  Is patient a ?: No    Activities of Daily Living Prior to Admission  Functional Status: Total dependent  Completes ADLs independently?: No  Level of ADL dependence: Assistance  Ambulates independently?: No  Level of ambulatory dependence: Assistance  Does patient use assisted devices?: Yes  Assisted Devices (DME) used: Walker  Does patient currently own DME?: Yes  What DME does the patient currently own?: Walker  Does patient have a history of Outpatient Therapy (PT/OT)?: No  Does the patient have a history of Short-Term Rehab?: Yes  Does patient have a history of HHC?: No  Does patient currently have HHC?: No         Patient Information Continued  Income Source: Pension/care home  Does patient have prescription coverage?: Yes  Can the patient afford their medications and any related supplies (such as glucometers or test strips)?: Yes  Does patient receive dialysis treatments?: No  Does patient have a history of substance abuse?: No  Does patient have a history of Mental Health Diagnosis?: No         Means of Transportation  Means of Transport to Appts:: Other (Comment) (facility transport)          DISCHARGE DETAILS:    Discharge planning discussed with:: [facility] AYANA Santa   [place] of Choice: Yes     CM contacted family/caregiver?: Yes  Were Treatment Team discharge recommendations reviewed with patient/caregiver?: Yes  Did patient/caregiver verbalize understanding of patient care needs?: Yes  Were patient/caregiver advised of the risks associated with not following Treatment Team discharge recommendations?: Yes    Contacts  Patient Contacts: [place]  "[facility]  Relationship to Patient:: Referral Source  Contact Method: Phone  Phone Number: [phone]  Reason/Outcome: Continuity of Care, Emergency Contact, Discharge Planning                 Attempted to meet with patient at bedside, pt not AAO, unsure where he is  Attempted to contact emergency contacts listed- Brother and Sister- both have the same number, picked up but did not speak    Spoke to Senior Jake Santa CM ( [phone]). Arina confirms patient resides at [facility] where is he a LTC resident. Patients brother is confused at baseline, and sister lost her voice d/t esophageal cancer and cannot speak  States patient is dependent at baseline, bed bound    Per facility, patient min on upper, total assist on lower. 1 person assist for grooming toileting and dressing. max assist bed mobility\"    Return referral placed- CM following            "

## 2025-05-13 NOTE — ASSESSMENT & PLAN NOTE
Home meds: Symbicort twice daily  As per pulmonology: Started on Xopenox and atrovent.  Stopped Symbicort as it could complicate pneumonia

## 2025-05-13 NOTE — ASSESSMENT & PLAN NOTE
PFT 9/26/2023 No obstruction on spirometry. Normal DLCO. Has mild emphysema on CT. Former smoker quit over 30 years ago. Currently on symbicort twice daily  No signs of exacerbation  Nebs as needed inpatient  Would recommend discontinuing symbicort as this can increase risk of pneumonia and his COPD appears mild.   Can start spiriva if needed

## 2025-05-13 NOTE — UTILIZATION REVIEW
NOTIFICATION OF INPATIENT ADMISSION   AUTHORIZATION REQUEST   SERVICING FACILITY:   [facility]  Address: [address] [place] [address]  Tax ID: [id number]  NPI: [id number] ATTENDING PROVIDER:  Attending Name and NPI#: Kenia Basurto Do [[id number]]  Address: [address] [place] [address]  Phone: [phone]   ADMISSION INFORMATION:  Place of Service: Inpatient [facility]  Place of Service Code: 21  Inpatient Admission Date/Time: 5/12/25  5:58 PM  Discharge Date/Time: No discharge date for patient encounter.  Admitting Diagnosis Code/Description:  Altered mental status [R41.82]  Pneumonia [J18.9]  Cystitis [N30.90]  ARMANDO (acute kidney injury) (HCC) [N17.9]  Severe sepsis (HCC) [A41.9, R65.20]     UTILIZATION REVIEW CONTACT:  Lester Desai, Utilization   Network Utilization Review Department  Phone: [phone]  Fax: [phone]  Email: [email or web address]  Contact for approvals/pending authorizations, clinical reviews, and discharge.     PHYSICIAN ADVISORY SERVICES:  Medical Necessity Denial & Rtip-oi-Uudg Review  Phone: [phone]  Fax: [phone]  Email: [email or web address]     DISCHARGE SUPPORT TEAM:  For Patients Discharge Needs & Updates  Phone: [phone] opt. 2 Fax: [phone]  Email: [email or web address]

## 2025-05-13 NOTE — QUICK NOTE
Mathew Rico is known to our practice for neurogenic bladder with chronic SPT.  Patient last seen 4/29 for exchange with next scheduled tube exchange 6/11/25.   Patient currently admitted with sepsis secondary to pneumonia.  CT reveals bladder wall thickening which is chronic.  Denies any  symptoms.  Continue with scheduled follow up for next SPT exchange 6/1/25.

## 2025-05-13 NOTE — UTILIZATION REVIEW
Initial Clinical Review    Admission: Date/Time/Statement:   Admission Orders (From admission, onward)       Ordered        05/12/25 1758  INPATIENT ADMISSION  Once                          Orders Placed This Encounter   Procedures    INPATIENT ADMISSION     Standing Status:   Standing     Number of Occurrences:   1     Level of Care:   Med Surg [16]     Estimated length of stay:   More than 2 Midnights     Certification:   I certify that inpatient services are medically necessary for this patient for a duration of greater than two midnights. See H&P and MD Progress Notes for additional information about the patient's course of treatment.     ED Arrival Information       Expected   5/12/2025     Arrival   5/12/2025 15:07    Acuity   Emergent              Means of arrival   Ambulance    Escorted by   [facility]    Service   Hospitalist    Admission type   Emergency              Arrival complaint   Ems             Chief Complaint   Patient presents with    Altered Mental Status     Pt had recent pneumonia, nursing home noticed that today he had become lethargic and less talkative, pt also has a cough       Initial Presentation: 74 y.o. male with PMHx including HTN, HLD, CHF, GERSON, who presented on 5/12/25 to ED due to change in mental status and cough.  In the ED, disoriented, ill-appearing, dry mucous membranes and rales present. CT CAP with contrast showed retained secretions in the left mainstem bronchus and occlusion of the left lower lobe bronchus and its major branches with more distal mucous plugging in the left lower lobe, Possible aspiration pneumonia. Marked bladder wall thickening possible cystitis. Labs revealed  WBC 18.68, BUN 35, Cr 1.48, Alb 3.4, lactic acid 3.8. EKG NSR. Given 2 L IVF bolus, started on IV ceftriaxone and IV Zosyn in ED.     Plan:  Admit Inpatient status Dx Sepsis, Possible LLL Pneumonia:   med surg, fu on blood and urine cxs, trend fever curve and WBC, start IV Zosyn,  Pulmonology and Urology consults, Monitor blood sugars and start SSI. Monitor BP and hold home antihypertensives. Order CPAP qhs.     Anticipated Length of Stay/Certification Statement: Patient will be admitted on an inpatient basis with an anticipated length of stay of greater than 2 midnights secondary to sepsis.     5/12 Per Urology: known to our practice for neurogenic bladder with chronic SPT. Patient last seen 4/29 for exchange with next scheduled tube exchange 6/11/25. Patient currently admitted with sepsis secondary to pneumonia. CT reveals bladder wall thickening which is chronic. Denies any  symptoms. Continue with scheduled follow up for next SPT exchange 6/1/25.     Date: 5/13   Day 2: Per Pulmonology: Scattered rhonchi noted. Check VBG to assess for chronic hypercapnia.  May benefit from BIPAP instead of CPAP. Continue Zosyn at this time. Would complete 5 days of antibiotics. Start Xopeenx and atrovent TID. Can consider saline nebs. D/C symbicort for risk of pneumonia.    Date: 5/14  Day 3: Has surpassed a 2nd midnight with active treatments and services. Per Pulmonology: Reports improvement in shortness of breath. He does continue to cough.  Decreased breath sounds, slight upper airway sounds due to retained secretions. Continue to monitor resp status, IV ABX for 5 days, flutter valve ordered, DC symbicort, continue albuterol, nightly CPAP. Recommend PFTs as OP.     ED Treatment-Medication Administration from 05/12/2025 1507 to 05/12/2025 1934         Date/Time Order Dose Route Action     05/12/2025 1611 ceftriaxone (ROCEPHIN) 2 g/50 mL in dextrose IVPB 2,000 mg Intravenous New Bag     05/12/2025 1532 sodium chloride 0.9 % bolus 1,000 mL 1,000 mL Intravenous New Bag     05/12/2025 1625 multi-electrolyte (Plasmalyte-A/Isolyte-S PH 7.4/Normosol-R) IV bolus 1,000 mL 1,000 mL Intravenous New Bag     05/12/2025 1801 multi-electrolyte (Plasmalyte-A/Isolyte-S PH 7.4/Normosol-R) IV bolus 1,000 mL 1,000 mL  Intravenous New Bag     05/12/2025 1649 iohexol (OMNIPAQUE) 350 MG/ML injection (MULTI-DOSE) 85 mL 85 mL Intravenous Given     05/12/2025 1917 piperacillin-tazobactam (ZOSYN) 4.5 g in sodium chloride 0.9 % 100 mL IV LOADING DOSE 4.5 g Intravenous New Bag            Scheduled Medications:  [Held by provider] amLODIPine, 10 mg, Oral, Daily  atorvastatin, 80 mg, Oral, Daily With Dinner  baclofen, 5 mg, Oral, TID  clopidogrel, 75 mg, Oral, Daily  enoxaparin, 40 mg, Subcutaneous, Q24H CIERA  [Held by provider] furosemide, 40 mg, Oral, Daily  gabapentin, 300 mg, Oral, TID  ipratropium, 0.5 mg, Nebulization, TID  levalbuterol, 1.25 mg, Nebulization, TID  [Held by provider] losartan, 50 mg, Oral, Daily  mirtazapine, 7.5 mg, Oral, HS  pantoprazole, 40 mg, Oral, Daily  piperacillin-tazobactam, 4.5 g, Intravenous, Q8H      Continuous IV Infusions:   sodium chloride 0.9 % infusion  Rate: 100 mL/hr Dose: 100 mL/hr  Freq: Continuous Route: IV  Indications of Use: IV Hydration  Last Dose: Stopped (05/13/25 0843)  Start: 05/12/25 1915 End: 05/13/25 0829     PRN Meds:  acetaminophen, 650 mg, Oral, Q6H PRN  albuterol, 2 puff, Inhalation, Q6H PRN      ED Triage Vitals   Temperature Pulse Respirations Blood Pressure SpO2 Pain Score   05/12/25 1524 05/12/25 1512 05/12/25 1512 05/12/25 1512 05/12/25 1512 05/12/25 2000   99.8 °F (37.7 °C) 100 20 114/55 97 % No Pain     Weight (last 2 days)       Date/Time Weight    05/12/25 1800 65 (143.4)            Vital Signs (last 3 days)       Date/Time Temp Pulse Resp BP MAP (mmHg) SpO2 Calculated FIO2 (%) - Nasal Cannula Nasal Cannula O2 Flow Rate (L/min) O2 Device Patient Position - Orthostatic VS Annamarie Coma Scale Score Pain    05/14/25 0800 -- -- -- -- -- -- -- -- None (Room air) -- 14 No Pain    05/14/25 07:47:55 98.2 °F (36.8 °C) 89 16 137/54 82 95 % -- -- -- -- -- --    05/14/25 07:21:49 -- 73 19 143/62 89 100 % -- -- -- -- -- --    05/14/25 0709 -- -- -- -- -- 97 % -- -- -- -- -- --     05/13/25 22:25:57 98.1 °F (36.7 °C) 83 12 120/56 77 95 % -- -- -- -- -- --    05/13/25 1945 -- -- -- -- -- -- -- -- None (Room air) -- 14 No Pain    05/13/25 15:11:45 98.6 °F (37 °C) 80 18 116/56 76 95 % -- -- -- -- -- --    05/13/25 1437 -- -- -- -- -- 96 % -- -- -- -- -- --    05/13/25 0841 -- -- -- -- -- -- -- -- None (Room air) -- 14 No Pain    05/13/25 07:53:07 98.7 °F (37.1 °C) 77 16 113/60 78 95 % -- -- -- -- -- --    05/12/25 20:28:01 98 °F (36.7 °C) 93 20 126/56 79 96 % -- -- -- -- -- --    05/12/25 2000 -- -- -- -- -- -- -- -- -- -- 14 No Pain    05/12/25 1915 -- 95 19 142/64 92 98 % -- -- None (Room air) Lying -- --    05/12/25 1900 -- 99 23 141/64 92 97 % -- -- None (Room air) Lying -- --    05/12/25 1845 -- 91 20 122/56 81 98 % -- -- None (Room air) Lying -- --    05/12/25 1830 -- 93 18 119/57 82 92 % -- -- None (Room air) Lying -- --    05/12/25 1815 -- 91 19 121/57 82 96 % -- -- None (Room air) Lying -- --    05/12/25 1800 -- 96 20 120/58 83 96 % -- -- None (Room air) Lying -- --    05/12/25 1745 -- 91 20 123/78 96 96 % -- -- None (Room air) Lying -- --    05/12/25 1730 -- 89 16 117/59 85 97 % 28 2 L/min Nasal cannula Lying -- --    05/12/25 1715 -- 90 23 123/59 85 96 % -- -- -- -- -- --    05/12/25 1700 -- 91 18 123/59 85 97 % 28 2 L/min Nasal cannula Lying -- --    05/12/25 1645 -- 90 16 139/59 -- 98 % 28 2 L/min Nasal cannula Lying -- --    05/12/25 1637 -- -- -- -- -- -- -- -- -- -- 14 --    05/12/25 1630 -- 85 16 109/55 79 97 % 28 2 L/min Nasal cannula Lying -- --    05/12/25 1615 -- 93 22 96/52 71 94 % 28 2 L/min Nasal cannula Lying -- --    05/12/25 1600 -- 89 19 73/44 54 94 % 28 2 L/min Nasal cannula Lying -- --    05/12/25 1545 -- 93 22 108/53 76 96 % 28 2 L/min Nasal cannula Lying -- --    05/12/25 1524 99.8 °F (37.7 °C) -- -- -- -- -- -- -- -- -- -- --    05/12/25 1518 -- -- -- -- -- -- -- -- Nasal cannula -- -- --    05/12/25 1512 -- 100 20 114/55 -- 97 % -- -- Nasal cannula Sitting -- --               Pertinent Labs/Diagnostic Test Results:   Radiology:  FL barium swallow video w speech   Final Interpretation by SARITHA MARTINEZ (05/14 0434)      CT chest abdomen pelvis w contrast   Final Interpretation by Renee Morton MD (05/12 7184)      1) Retained secretions in the left mainstem bronchus and occlusion of the left lower lobe bronchus and its major branches, with some more distal mucous plugging in the left lower lobe. Left lower lobe airspace opacities, new from February 2025, likely    representing aspiration pneumonia.      2) Suprapubic Cage catheter in place with marked bladder wall thickening, mucosal hyperenhancement, and perivesical fat stranding indicative of cystitis. No evidence of pyelonephritis.      3) Trace ascites around the urinary bladder, likely reactive to aforementioned cystitis. No organized collections to suggest an abscess.      4) Circumferential wall thickening in the mid and lower rectum, which may indicate proctitis.      5) No other acute abdominal or pelvic pathology.      6) Additional findings as above.                  Workstation performed: [id number]           Cardiology:  ECG 12 lead   Final Result by Flo Galvan MD (05/12 1533)   Normal sinus rhythm   Cannot rule out Lateral infarct , age undetermined   Abnormal ECG   When compared with ECG of 10-Feb-2025 23:36,   No significant change was found   Confirmed by Flo Galvan ([id number]) on 5/12/2025 3:33:17 PM        GI:  No orders to display           Results from last 7 days   Lab Units 05/14/25  0606 05/13/25  0543 05/12/25  1533   WBC Thousand/uL 11.30* 15.57* 18.68*   HEMOGLOBIN g/dL 11.4* 10.5* 12.5   HEMATOCRIT % 37.0 32.4* 38.6   PLATELETS Thousands/uL 353 329 370   TOTAL NEUT ABS Thousands/µL 7.00 10.70* 14.78*         Results from last 7 days   Lab Units 05/14/25  0606 05/13/25  0543 05/12/25  1533   SODIUM mmol/L 139 138 136   POTASSIUM mmol/L 3.5 3.7 4.3   CHLORIDE mmol/L 108 107 103    CO2 mmol/L 25 23 21   ANION GAP mmol/L 6 8 12   BUN mg/dL 11 21 35*   CREATININE mg/dL 0.71 0.92 1.48*   EGFR ml/min/1.73sq m 91 81 45   CALCIUM mg/dL 8.6 8.4 9.1   MAGNESIUM mg/dL  --  1.7*  --      Results from last 7 days   Lab Units 05/13/25  0543 05/12/25  1533   AST U/L 13 13   ALT U/L 6* 8   ALK PHOS U/L 57 68   TOTAL PROTEIN g/dL 5.9* 7.0   ALBUMIN g/dL 2.8* 3.4*   TOTAL BILIRUBIN mg/dL 0.38 0.36     Results from last 7 days   Lab Units 05/13/25  0751   POC GLUCOSE mg/dl 97     Results from last 7 days   Lab Units 05/14/25  0606 05/13/25  0543 05/12/25  1533   GLUCOSE RANDOM mg/dL 105 96 97     Results from last 7 days   Lab Units 05/13/25  1158   PH RYAN  7.408*   PCO2 RYAN mm Hg 36.3*   PO2 RYAN mm Hg 104.1*   HCO3 RYAN mmol/L 22.4*   BASE EXC RYAN mmol/L -1.8   O2 CONTENT RYAN ml/dL 16.9   O2 HGB, VENOUS % 95.9*     Results from last 7 days   Lab Units 05/12/25  1622   PROTIME seconds 13.7   INR  1.02   PTT seconds 25         Results from last 7 days   Lab Units 05/12/25  1533   PROCALCITONIN ng/ml 0.15     Results from last 7 days   Lab Units 05/12/25  1755 05/12/25  1533   LACTIC ACID mmol/L 2.1* 3.8*     Results from last 7 days   Lab Units 05/13/25  1656   STREP PNEUMONIAE ANTIGEN, URINE  Negative   LEGIONELLA URINARY ANTIGEN  Negative         Results from last 7 days   Lab Units 05/12/25  1538 05/12/25  1533   BLOOD CULTURE  No Growth at 24 hrs. No Growth at 24 hrs.   URINE CULTURE  >100,000 cfu/ml Staphylococcus aureus*  --      Past Medical History:   Diagnosis Date    Acute laryngitis     Acute nonsuppurative otitis media, unspecified laterality     Arm weakness     Arthritis     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HCC)     Bladder infection     Bronchitis     Constipation     Cough     Diabetes (HCC)     Diabetes mellitus (HCC)     Dizziness     Dysfunction of eustachian tube     Erectile dysfunction of non-organic origin     Fatigue     Glaucoma     Hiatal hernia     Hypertension     Imbalance     Leg muscle  spasm     Leukocytosis 04/01/2024    MS (multiple sclerosis) (HCC)     Multiple sclerosis (HCC)     Nausea and vomiting 02/16/2025    Nephrolithiasis     Neurogenic bladder     No natural teeth     Sinus pain     Spinal stenosis     Strain of thoracic region     Stroke (HCC)     Suprapubic catheter (HCC)      Present on Admission:   Type 2 diabetes mellitus without complication, without long-term current use of insulin (HCC)   Primary hypertension   GERSON on CPAP   (Resolved) Nausea and vomiting   Multiple sclerosis (HCC)   Hyperlipidemia   Chronic diastolic congestive heart failure (HCC)      Admitting Diagnosis: Altered mental status [R41.82]  Pneumonia [J18.9]  Cystitis [N30.90]  ARMANDO (acute kidney injury) (HCC) [N17.9]  Severe sepsis (HCC) [A41.9, R65.20]  Age/Sex: 74 y.o. male    Network Utilization Review Department  ATTENTION: Please call with any questions or concerns to [phone] and carefully listen to the prompts so that you are directed to the right person. All voicemails are confidential.   For Discharge needs, contact Care Management DC Support Team at [phone] opt. 2  Send all requests for admission clinical reviews, approved or denied determinations and any other requests to dedicated fax number below belonging to the campus where the patient is receiving treatment. List of dedicated fax numbers for the Facilities:  FACILITY NAME UR FAX NUMBER   ADMISSION DENIALS (Administrative/Medical Necessity) [phone]   DISCHARGE SUPPORT TEAM (NETWORK) [phone]   PARENT CHILD HEALTH (Maternity/NICU/Pediatrics) [phone]   [facility] [phone]   [facility] [phone]   [facility] [phone]   [facility] [phone]   [facility] [phone]   [facility] [phone]   [facility] [phone]   [facility]  [facility] [phone]   [facility] [phone]   [facility] [phone]   [facility] [phone]   [facility] [phone]

## 2025-05-13 NOTE — RESPIRATORY THERAPY NOTE
RT Protocol Note  Mathew Rico 74 y.o. male MRN: [id number]  [facility] Encounter: [id number]    Assessment    Principal Problem:    Sepsis (HCC)  Active Problems:    Hyperlipidemia    Primary hypertension    Multiple sclerosis (HCC)    Chronic diastolic congestive heart failure (HCC)    GERSON on CPAP    Type 2 diabetes mellitus without complication, without long-term current use of insulin (HCC)    ARMANDO (acute kidney injury) (HCC)    COPD (chronic obstructive pulmonary disease) ([facility])    Pneumonia      Home Pulmonary Medications:  Albuterol and symbicort       Past Medical History:   Diagnosis Date    Acute laryngitis     Acute nonsuppurative otitis media, unspecified laterality     Arm weakness     Arthritis     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HCC)     Bladder infection     Bronchitis     Constipation     Cough     Diabetes (HCC)     Diabetes mellitus (HCC)     Dizziness     Dysfunction of eustachian tube     Erectile dysfunction of non-organic origin     Fatigue     Glaucoma     Hiatal hernia     Hypertension     Imbalance     Leg muscle spasm     Leukocytosis 2024    MS (multiple sclerosis) ([facility])     Multiple sclerosis (HCC)     Nausea and vomiting 2025    Nephrolithiasis     Neurogenic bladder     No natural teeth     Sinus pain     Spinal stenosis     Strain of thoracic region     Stroke ([facility])     Suprapubic catheter ([facility])      Social History     Socioeconomic History    Marital status: Single     Spouse name: Not on file    Number of children: Not on file    Years of education: Not on file    Highest education level: Not on file   Occupational History    Occupation:    retired   Tobacco Use    Smoking status: Former     Current packs/day: 0.00     Average packs/day: 0.5 packs/day for 31.0 years (15.5 ttl pk-yrs)     Types: Cigarettes     Start date:      Quit date:      Years since quittin.3    Smokeless tobacco: Never   Vaping Use    Vaping status: Never Used  "  Substance and Sexual Activity    Alcohol use: Not Currently    Drug use: No    Sexual activity: Not Currently   Other Topics Concern    Not on file   Social History Narrative    ** Merged History Encounter **          Social Drivers of Health     Financial Resource Strain: Not on file   Food Insecurity: Patient Declined (2025)    Nursing - Inadequate Food Risk Classification     Worried About Running Out of Food in the Last Year: Never true     Ran Out of Food in the Last Year: Never true     Ran Out of Food in the Last Year: Patient declined   Transportation Needs: Patient Declined (2025)    Nursing - Transportation Risk Classification     Lack of Transportation: Not on file     Lack of Transportation: Patient declined   Physical Activity: Not on file   Stress: Not on file   Social Connections: Not on file   Intimate Partner Violence: Unknown (2025)    Nursing IPS     Feels Physically and Emotionally Safe: Not on file     Physically Hurt by Someone: Not on file     Humiliated or Emotionally Abused by Someone: Not on file     Physically Hurt by Someone: No     Hurt or Threatened by Someone: No   Housing Stability: Unknown (2025)    Nursing: Inadequate Housing Risk Classification     Has Housing: Not on file     Worried About Losing Housing: Not on file     Unable to Get Utilities: Not on file     Unable to Pay for Housing in the Last Year: No     Has Housin       Subjective         Objective    Physical Exam:   Assessment Type: Assess only  General Appearance: Alert, Awake  Respiratory Pattern: Symmetrical  Chest Assessment: Chest expansion symmetrical  Bilateral Breath Sounds: Rhonchi, Scattered  Cough: Congested  O2 Device: r/a    Vitals:  Blood pressure 116/56, pulse 80, temperature 98.6 °F (37 °C), resp. rate 18, height 5' 4\" (1.626 m), weight 65 kg (143 lb 6.4 oz), SpO2 95%.          Imaging and other studies: Results Review Statement: I reviewed radiology reports from this admission " including: CT chest.    O2 Device: r/a     Plan    Respiratory Plan: Mild Distress pathway  Airway Clearance Plan: Flutter     Resp Comments: PT assesed for RCP and ACP,pt admitted for change in mental status,cough and sob.pt with hx of copd.take pulmonary med at home.will follow pt with RCP.pt unable to bring phelgm up.will instruct and educate on FV.will follow up as ACP

## 2025-05-13 NOTE — SPEECH THERAPY NOTE
Speech-Language Pathology Bedside Swallow Evaluation      Patient Name: Mathew Rico    Today's Date: 5/13/2025     Problem List  Principal Problem:    Sepsis (HCC)  Active Problems:    Hyperlipidemia    Primary hypertension    Multiple sclerosis (HCC)    Chronic diastolic congestive heart failure (HCC)    GERSON on CPAP    Type 2 diabetes mellitus without complication, without long-term current use of insulin (HCC)    ARMANDO (acute kidney injury) (HCC)    COPD (chronic obstructive pulmonary disease) (HCC)    Pneumonia    Past Medical History  Past Medical History:   Diagnosis Date    Acute laryngitis     Acute nonsuppurative otitis media, unspecified laterality     Arm weakness     Arthritis     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HCC)     Bladder infection     Bronchitis     Constipation     Cough     Diabetes (HCC)     Diabetes mellitus (HCC)     Dizziness     Dysfunction of eustachian tube     Erectile dysfunction of non-organic origin     Fatigue     Glaucoma     Hiatal hernia     Hypertension     Imbalance     Leg muscle spasm     Leukocytosis 04/01/2024    MS (multiple sclerosis) (HCC)     Multiple sclerosis (HCC)     Nausea and vomiting 02/16/2025    Nephrolithiasis     Neurogenic bladder     No natural teeth     Sinus pain     Spinal stenosis     Strain of thoracic region     Stroke (HCC)     Suprapubic catheter ([facility])      Past Surgical History  Past Surgical History:   Procedure Laterality Date    APPENDECTOMY      BRAIN SURGERY      Coil placed in aneurysm    CEREBRAL ANEURYSM REPAIR      CYSTOSCOPY      CYSTOSCOPY      CYSTOSCOPY  06/11/2018    CYSTOSCOPY  01/15/2021    EYE SURGERY      transscleral cyclophotocoagulation noncontact YAG laser    IR SUPRAPUBIC CATHETER CHECK/CHANGE/REINSERTION/UPSIZE  3/28/2024    MYRINGOTOMY      with ventilation tube insertion    NH LITHOLAPAXY SMPL/SM <2.5 CM N/A 5/7/2019    Procedure: CYSTOSCOPY, holmium laser litholapaxy of bladder stones, EXCHANGE OF SP TUBE;  Surgeon: Javy  MD Nesha;  Location: [facility];  Service: Urology    SUPRAPUBIC CATHETER INSERTION         Summary  Pt presented with s/s suggestive of mild-moderate oropharyngeal dysphagia. Symptoms or concerns included suspected decreased control of materials  and missing dentition for mastication, as well as  suspected pharyngeal swallow delay and suspected decreased hyolaryngeal elevation upon palpation. Inconsistent coughing noted with liquid trials. Thick liquids taken with no s/s aspiration. Pt reported his dentures were left at home. Rec initiation of conservative diet puree and NTL with f/u MBS tomorrow.     Risk/s for Aspiration: mild-mod    Recommended Diet: puree/level 1 diet and nectar thick liquids now  Recommended Form of Meds: crushed with puree   Aspiration precautions and swallowing strategies: upright posture, only feed when fully alert, slow rate of feeding, and small bites/sips  Other Recommendations: Continue frequent oral care      Current Medical Status per H&P 5/12/25  Mathew Rico is a 74 y.o. male with a PMH of past medical history of hypertension, hyperlipidemia, congestive heart failure, obstructive sleep apnea, coming in for change in mental status and cough.  In the emergency department patient did meet sepsis criteria and received 3 L of fluid and received ceftriaxone.  Will be admitted for sepsis       Special Studies:  CT chest 5/12/25 CHEST  BRONCHOPULMONARY: Retained secretions seen in the left mainstem bronchus and throughout the left lower lobe bronchus and its major branches. There is also some more distal mucous plugging in the left lower lobe. Evaluation of the lung parenchyma limited   by motion artifact. Airspace opacities are seen in the left lower lobe, new from February 2025.  There is a calcified granuloma in the right upper lobe.    Social/Education/Vocational Hx:  Pt lives with family    Swallow Information   Current Risks for Dysphagia & Aspiration: AMS  Current  Symptoms/Concerns: coughing with po  Current Diet: NPO   Baseline Diet: regular diet and thin liquids previous admissions      Baseline Assessment   Behavior/Cognition: alert  Speech/Language Status: able to participate in basic conversation and able to follow commands  Patient Positioning: upright in bed  Pain Status/Interventions/Response to Interventions: No report of or nonverbal indications of pain.       Swallow Mechanism Exam  Facial: symmetrical  Labial: decreased strength  Lingual: bilateral decreased strength and decreased coordination  Velum: symmetrical  Mandible:  decreased ROM  Dentition: edentulous  Vocal quality:weak   Volitional Cough: weak   Respiratory Status: on RA     Consistencies Assessed and Performance   Consistencies Administered: thin liquids, honey thick, and puree    Oral Stage: mild-moderate and suspected decreased control of all materials     Pharyngeal Stage: mild-moderate, suspected pharyngeal swallow delay, and suspected decreased hyolaryngeal elevation upon palpation    Esophageal Concerns: none reported      Summary and Recommendations (see above)    Results Reviewed with: patient, RN, and MD     Treatment Recommended: yes     Frequency of treatment: 2-3x/week      Dysphagia LTG  -Patient will demonstrate safe and effective oral intake (without overt s/s significant oral/pharyngeal dysphagia including s/s penetration or aspiration) for the highest appropriate diet level.     Short Term Goals:  -Pt will tolerate Dysphagia 1/pureed diet and nectar thick liquid with no significant s/s oral or pharyngeal dysphagia across 1-3 diagnostic sessions.    -Patient will tolerate trials of upgraded food and/or liquid texture with no significant s/s of oral or pharyngeal dysphagia including aspiration across 1-3 diagnostic sessions     -Patient will comply with a Video/Modified Barium Swallow study for more complete assessment of swallowing anatomy/physiology/aspiration risk and to assess  efficacy of treatment techniques so as to best guide treatment plan

## 2025-05-14 ENCOUNTER — APPOINTMENT (INPATIENT)
Dept: RADIOLOGY | Facility: HOSPITAL | Age: 75
DRG: 871 | End: 2025-05-14
Payer: MEDICARE

## 2025-05-14 LAB
ANION GAP SERPL CALCULATED.3IONS-SCNC: 6 MMOL/L (ref 4–13)
BACTERIA UR CULT: ABNORMAL
BASOPHILS # BLD AUTO: 0.06 THOUSANDS/ÂΜL (ref 0–0.1)
BASOPHILS NFR BLD AUTO: 1 % (ref 0–1)
BUN SERPL-MCNC: 11 MG/DL (ref 5–25)
CALCIUM SERPL-MCNC: 8.6 MG/DL (ref 8.4–10.2)
CHLORIDE SERPL-SCNC: 108 MMOL/L (ref 96–108)
CO2 SERPL-SCNC: 25 MMOL/L (ref 21–32)
CREAT SERPL-MCNC: 0.71 MG/DL (ref 0.6–1.3)
EOSINOPHIL # BLD AUTO: 0.6 THOUSAND/ÂΜL (ref 0–0.61)
EOSINOPHIL NFR BLD AUTO: 5 % (ref 0–6)
ERYTHROCYTE [DISTWIDTH] IN BLOOD BY AUTOMATED COUNT: 16.2 % (ref 11.6–15.1)
GFR SERPL CREATININE-BSD FRML MDRD: 91 ML/MIN/1.73SQ M
GLUCOSE SERPL-MCNC: 105 MG/DL (ref 65–140)
HCT VFR BLD AUTO: 37 % (ref 36.5–49.3)
HGB BLD-MCNC: 11.4 G/DL (ref 12–17)
IMM GRANULOCYTES # BLD AUTO: 0.08 THOUSAND/UL (ref 0–0.2)
IMM GRANULOCYTES NFR BLD AUTO: 1 % (ref 0–2)
LYMPHOCYTES # BLD AUTO: 2.58 THOUSANDS/ÂΜL (ref 0.6–4.47)
LYMPHOCYTES NFR BLD AUTO: 23 % (ref 14–44)
MCH RBC QN AUTO: 26.2 PG (ref 26.8–34.3)
MCHC RBC AUTO-ENTMCNC: 30.8 G/DL (ref 31.4–37.4)
MCV RBC AUTO: 85 FL (ref 82–98)
MONOCYTES # BLD AUTO: 0.98 THOUSAND/ÂΜL (ref 0.17–1.22)
MONOCYTES NFR BLD AUTO: 9 % (ref 4–12)
MRSA NOSE QL CULT: NORMAL
NEUTROPHILS # BLD AUTO: 7 THOUSANDS/ÂΜL (ref 1.85–7.62)
NEUTS SEG NFR BLD AUTO: 61 % (ref 43–75)
NRBC BLD AUTO-RTO: 0 /100 WBCS
PLATELET # BLD AUTO: 353 THOUSANDS/UL (ref 149–390)
PMV BLD AUTO: 8.6 FL (ref 8.9–12.7)
POTASSIUM SERPL-SCNC: 3.5 MMOL/L (ref 3.5–5.3)
RBC # BLD AUTO: 4.35 MILLION/UL (ref 3.88–5.62)
SODIUM SERPL-SCNC: 139 MMOL/L (ref 135–147)
WBC # BLD AUTO: 11.3 THOUSAND/UL (ref 4.31–10.16)

## 2025-05-14 PROCEDURE — 94640 AIRWAY INHALATION TREATMENT: CPT

## 2025-05-14 PROCEDURE — 94664 DEMO&/EVAL PT USE INHALER: CPT

## 2025-05-14 PROCEDURE — 97167 OT EVAL HIGH COMPLEX 60 MIN: CPT

## 2025-05-14 PROCEDURE — 80048 BASIC METABOLIC PNL TOTAL CA: CPT

## 2025-05-14 PROCEDURE — 74230 X-RAY XM SWLNG FUNCJ C+: CPT

## 2025-05-14 PROCEDURE — 92611 MOTION FLUOROSCOPY/SWALLOW: CPT

## 2025-05-14 PROCEDURE — 99232 SBSQ HOSP IP/OBS MODERATE 35: CPT | Performed by: STUDENT IN AN ORGANIZED HEALTH CARE EDUCATION/TRAINING PROGRAM

## 2025-05-14 PROCEDURE — 97163 PT EVAL HIGH COMPLEX 45 MIN: CPT

## 2025-05-14 PROCEDURE — 94760 N-INVAS EAR/PLS OXIMETRY 1: CPT

## 2025-05-14 PROCEDURE — 99232 SBSQ HOSP IP/OBS MODERATE 35: CPT | Performed by: INTERNAL MEDICINE

## 2025-05-14 PROCEDURE — 94668 MNPJ CHEST WALL SBSQ: CPT

## 2025-05-14 PROCEDURE — 85025 COMPLETE CBC W/AUTO DIFF WBC: CPT

## 2025-05-14 RX ADMIN — IPRATROPIUM BROMIDE 0.5 MG: 0.5 SOLUTION RESPIRATORY (INHALATION) at 07:06

## 2025-05-14 RX ADMIN — BACLOFEN 5 MG: 10 TABLET ORAL at 21:45

## 2025-05-14 RX ADMIN — ATORVASTATIN CALCIUM 80 MG: 80 TABLET, FILM COATED ORAL at 15:48

## 2025-05-14 RX ADMIN — BACLOFEN 5 MG: 10 TABLET ORAL at 10:00

## 2025-05-14 RX ADMIN — MIRTAZAPINE 7.5 MG: 15 TABLET, FILM COATED ORAL at 21:45

## 2025-05-14 RX ADMIN — LEVALBUTEROL HYDROCHLORIDE 1.25 MG: 1.25 SOLUTION RESPIRATORY (INHALATION) at 13:16

## 2025-05-14 RX ADMIN — LEVALBUTEROL HYDROCHLORIDE 1.25 MG: 1.25 SOLUTION RESPIRATORY (INHALATION) at 07:06

## 2025-05-14 RX ADMIN — PIPERACILLIN AND TAZOBACTAM 4.5 G: 36; 4.5 INJECTION, POWDER, FOR SOLUTION INTRAVENOUS at 08:41

## 2025-05-14 RX ADMIN — IPRATROPIUM BROMIDE 0.5 MG: 0.5 SOLUTION RESPIRATORY (INHALATION) at 13:16

## 2025-05-14 RX ADMIN — GABAPENTIN 300 MG: 300 CAPSULE ORAL at 21:45

## 2025-05-14 RX ADMIN — CEFTRIAXONE SODIUM 1000 MG: 10 INJECTION, POWDER, FOR SOLUTION INTRAVENOUS at 16:42

## 2025-05-14 RX ADMIN — ENOXAPARIN SODIUM 40 MG: 40 INJECTION SUBCUTANEOUS at 10:03

## 2025-05-14 RX ADMIN — BACLOFEN 5 MG: 10 TABLET ORAL at 15:48

## 2025-05-14 RX ADMIN — CLOPIDOGREL BISULFATE 75 MG: 75 TABLET, FILM COATED ORAL at 10:02

## 2025-05-14 RX ADMIN — PANTOPRAZOLE SODIUM 40 MG: 40 TABLET, DELAYED RELEASE ORAL at 10:02

## 2025-05-14 RX ADMIN — GABAPENTIN 300 MG: 300 CAPSULE ORAL at 10:03

## 2025-05-14 RX ADMIN — ASPIRIN 81 MG: 81 TABLET, COATED ORAL at 10:02

## 2025-05-14 RX ADMIN — GABAPENTIN 300 MG: 300 CAPSULE ORAL at 15:48

## 2025-05-14 NOTE — PLAN OF CARE
Problem: PHYSICAL THERAPY ADULT  Goal: Performs mobility at highest level of function for planned discharge setting.  See evaluation for individualized goals.  Description: Treatment/Interventions: Functional transfer training, LE strengthening/ROM, Therapeutic exercise, Endurance training, Cognitive reorientation, Patient/family training, Equipment eval/education, Bed mobility, Gait training, Spoke to nursing, Spoke to case management, OT  Equipment Recommended: Walker, Wheelchair (owns)       See flowsheet documentation for full assessment, interventions and recommendations.  Note: Prognosis: Fair  Problem List: Decreased strength, Decreased range of motion, Decreased endurance, Impaired balance, Decreased mobility, Decreased cognition, Impaired judgement, Decreased safety awareness, Pain  Assessment: Pt is a 74 y.o. male seen for PT evaluation s/p admit to [facility] on 5/12/2025. Pt was admitted with a primary dx of: sepsis.  PT now consulted for assessment of mobility and d/c needs.  Pts current comorbidities effecting treatment include: MS, COPD, Pneumonia and CHF. Pts current clinical presentation is Unstable/ Unpredictable (high complexity) due to Ongoing medical management for primary dx, Increased reliance on more restrictive AD compared to baseline, Decreased activity tolerance compared to baseline, Fall risk, Increased assistance needed from caregiver at current time, Cog status, Continuous pulse oximetry monitoring . Prior to admission, pt was living in SNF and required assist for all mobility and ADLs. Upon evaluation, pt currently is requiring max Ax2 for bed mobility; max Ax2 for transfers. Pt presents at PT eval functioning below baseline and currently w/ overall mobility deficits 2* to: BLE weakness, decreased ROM, impaired balance, decreased endurance, impaired coordination, gait deviations, pain, decreased activity tolerance compared to baseline, decreased functional mobility  tolerance compared to baseline, decreased safety awareness, impaired judgement, fall risk, decreased cognition. Pt currently at a fall risk 2* to impairments listed above.  Pt will continue to benefit from skilled acute PT interventions to address stated impairments; to maximize functional mobility; for ongoing pt/ family training; and DME needs. At conclusion of PT session pt returned BTB, bed alarm engaged, and all needs in reach with phone and call bell within reach. Pt denies any further questions at this time. The patient's AM-PAC Basic Mobility Inpatient Short Form Raw Score is 7. A Raw score of less than or equal to 16 suggests the patient may benefit from discharge to post-acute rehabilitation services. Please also refer to the recommendation of the Physical Therapist for safe discharge planning. Recommend return to facility w/ rehab services upon hospital D/C.        Rehab Resource Intensity Level, PT: III (Minimum Resource Intensity) (return to facility w/ therapy services)    See flowsheet documentation for full assessment.

## 2025-05-14 NOTE — ASSESSMENT & PLAN NOTE
PFT 9/26/2023 No obstruction on spirometry. Normal DLCO. Has mild emphysema on CT. Former smoker quit over 30 years ago. Currently on symbicort twice daily  No signs of exacerbation  Continue xopenex and atrovent nebs  Will start hypertonic saline for retained secretions  Would recommend discontinuing symbicort on discharge as this can increase risk of pneumonia and his COPD appears mild. Continue albuterol. If daily inhaler is needed, would recommend LAMA

## 2025-05-14 NOTE — PROGRESS NOTES
Progress Note - Pulmonology   Name: Mathew Rico 74 y.o. male I MRN: [id number]  Unit/Bed#: [facility] 820-01 I Date of Admission: 5/12/2025   Date of Service: 5/14/2025 I Hospital Day: 2    Assessment & Plan  Sepsis ([facility])  Suspect secondary to pneumonia in the setting of likely aspiration versus UTI. CT CAP with retained secretions in the left mainstem bronchus and occlusion of the left lower lobe bronchus with more distal mucous plugging in the left lower lobe concerning for aspiration pneumonia. Bladder wall thickening.   Started on Zosyn due to previous urine cultures having resistance  Continue antibiotics for 5 days for pneumonia  Pneumonia  CT CAP with retained secretions in the left mainstem bronchus and occlusion of the left lower lobe bronchus with more distal mucous plugging in the left lower lobe concerning for aspiration pneumonia. Started on Zosyn due to previous urine cultures having resistance as cocnern for both pneumonia and urinary infection  Continue antibiotics for 5 days for pneumonia  Recommend speech evaluation to determine if patient is aspirating  Flutter valve ordered to help patients with retained secretions  Would recommend discontinuing symbicort as this can increase risk of pneumonia and his COPD appears mild.   COPD (chronic obstructive pulmonary disease) ([facility])  PFT 9/26/2023 No obstruction on spirometry. Normal DLCO. Has mild emphysema on CT. Former smoker quit over 30 years ago. Currently on symbicort twice daily  No signs of exacerbation  Continue xopenex and atrovent nebs  Will start hypertonic saline for retained secretions  Would recommend discontinuing symbicort on discharge as this can increase risk of pneumonia and his COPD appears mild. Continue albuterol. If daily inhaler is needed, would recommend LAMA  GERSON on CPAP  On cpap nightly. No sleep study on file.  Continue nightly cpap as tolerated  Multiple sclerosis ([facility])  Concern for neuromuscular weakness due to MS.  Recommend  PFTs with MIP/MEP as outpatient    Will arrange for outpatient pulmonary follow-up in [place].     24 Hour Events : No acute events.  Subjective : Patient examined at bedside this morning. Reports improvement in shortness of breath. He does continue to cough.     Objective :  Temp:  [98.1 °F (36.7 °C)-98.6 °F (37 °C)] 98.2 °F (36.8 °C)  HR:  [73-89] 89  BP: (116-143)/(54-62) 137/54  Resp:  [12-19] 16  SpO2:  [95 %-100 %] 95 %  O2 Device: None (Room air)    Physical Exam  Constitutional:       General: He is not in acute distress.     Appearance: He is ill-appearing.   HENT:      Head: Normocephalic and atraumatic.     Cardiovascular:      Rate and Rhythm: Normal rate and regular rhythm.   Pulmonary:      Effort: Pulmonary effort is normal.      Breath sounds: No wheezing, rhonchi or rales.      Comments: Decreased breath sounds, slight upper airway sounds due to retained secretions  Abdominal:      Tenderness: There is no abdominal tenderness.     Musculoskeletal:      Cervical back: Normal range of motion.     Neurological:      Mental Status: He is alert.      Comments: Oriented to self   Psychiatric:      Comments: Unable to assess           Lab Results: I have reviewed the following results:   .     05/14/25  0606   WBC 11.30*   HGB 11.4*   HCT 37.0      SODIUM 139   K 3.5      CO2 25   BUN 11   CREATININE 0.71   GLUC 105     ABG: No new results in last 24 hours.    Imaging Results Review: I reviewed radiology reports from this admission including: chest xray and CT chest.     CT chest 5/12/2025: Retained secretions in the left mainstem bronchus and occlusion of the left lower lobe bronchus and its major branches, with some more distal mucous plugging in the left lower lobe. Left lower lobe airspace opacities, new from February 2025, likely representing aspiration pneumonia.     2) Suprapubic Cage catheter in place with marked bladder wall thickening, mucosal hyperenhancement, and perivesical fat  stranding indicative of cystitis. No evidence of pyelonephritis.     3) Trace ascites around the urinary bladder, likely reactive to aforementioned cystitis. No organized collections to suggest an abscess.     4) Circumferential wall thickening in the mid and lower rectum, which may indicate proctitis.     5) No other acute abdominal or pelvic pathology.     6) Additional findings as above.        Other Study Results Review: EKG was reviewed.   PFT Results Reviewed: PFT 9/26/2023 No obstruction on spirometry. Normal DLCO.    Ally Morillo MD  Pulmonary & Critical Care Medicine Fellow PGY-4  [facility]

## 2025-05-14 NOTE — PROGRESS NOTES
Progress Note - Hospitalist   Name: Mathew Rico 74 y.o. male I MRN: [id number]  Unit/Bed#: [facility] 820-01 I Date of Admission: 5/12/2025   Date of Service: 5/14/2025 I Hospital Day: 2     Assessment & Plan  Sepsis ([facility])  Lab Results   Component Value Date    WBC 11.30 (H) 05/14/2025    WBC 15.57 (H) 05/13/2025    WBC 18.68 (H) 05/12/2025    PROCALCITONI 0.15 05/12/2025    PROCALCITONI <0.05 02/10/2025    PROCALCITONI 0.09 01/22/2025    LACTICACID 2.1 (H) 05/12/2025    LACTICACID 3.8 (H) 05/12/2025    LACTICACID 2.1 (H) 02/11/2025   74-year-old male with past medical history of hypertension, hyperlipidemia and diabetes, COPD, CVA, CHF, MS with neurogenic bladder and chronic Cage presenting with change in mental status And cough.  Upon admission labs were significant for elevated white count 18 p.o.,tachycardia, tachypnea, elevated lactic acid 3.8.  CT chest abdomen pelvis with contrast 05/12/2025: Possible aspiration pneumonia, Marked bladder wall thickening possible cystitis  Patient has had multiple admissions for UTI 2/2 multi-drug resisatent organism. Was treated with Ertapenem during last admission at [facility] on 04/03, prior to that it was on 02/10.   Procalcitonin: Normal  S/p 2 L of IV fluid bolus and maintenance fluid  As per speech eval: Mild to moderate dysphagia. Puree and thick nectar ordered.     Pending blood cultures:negative  Uculture positive for staph could be a contaminate  Video barium swallow: no dysphagia   Most likely aspiration pneumonia versus UTI(given extensive history of prior UTIs and likely drug-resistant organism)  Started on Zosyn 4.5 g every 8 hours for 5 days  Urology was consulted for catheter exchange: No catheter exchange during this time.    Plan  Trend wbc and fever curve  Continue IV Zosyn 4 g every 8 hours D3/5  Appreciate Speech therapy recommendations  Repeat procalc      Type 2 diabetes mellitus without complication, without long-term current use of insulin ([facility])  Lab  Results   Component Value Date    HGBA1C 6.3 (H) 02/10/2025       Recent Labs     05/13/25  0751   POCGLU 97       Blood Sugar Average: Last 72 hrs:  (P) 97Home medications: Empagliflozin, Januvia, Flonase 1000 mg daily,  Sliding scale  Diabetic diet  Goal blood glucose 140-180    Primary hypertension  Amlodipine 10 mg daily  Lasix 40 mg once daily  Losartan 50 mg once daily  Holding blood pressure meds in the setting of sepsis    GERSON on CPAP  CPAP at night  Will get VBG; concern for hypercapnia  Might benefit from Biapa  Hyperlipidemia  Patient takes atorvastatin 80 mg daily at bedtime  Chronic diastolic congestive heart failure (HCC)  Wt Readings from Last 3 Encounters:   05/12/25 65 kg (143 lb 6.4 oz)   02/11/25 64.1 kg (141 lb 5 oz)   01/20/25 64.1 kg (141 lb 5 oz)   Hold Lasix in the setting of sepsis            ARMANDO (acute kidney injury) ([facility])  Resolved  COPD (chronic obstructive pulmonary disease) ([facility])  Home meds: Symbicort twice daily  As per pulmonology: Started on Xopenox and atrovent.  Stopped Symbicort as it could complicate pneumonia  Pneumonia      VTE Pharmacologic Prophylaxis: VTE Score: 6 High Risk (Score >/= 5) - Pharmacological DVT Prophylaxis Ordered: enoxaparin (Lovenox). Sequential Compression Devices Ordered.    Mobility:   Basic Mobility Inpatient Raw Score: 12  JH-HLM Goal: 4: Move to chair/commode  JH-HLM Achieved: 3: Sit at edge of bed  JH-HLM Goal NOT achieved. Continue with multidisciplinary rounding and encourage appropriate mobility to improve upon JH-HLM goals.    Patient Centered Rounds: I performed bedside rounds with nursing staff today.   Discussions with Specialists or Other Care Team Provider: Pulmonology    Education and Discussions with Family / Patient: Attempted to update  (brother) via phone. Unable to contact.    Current Length of Stay: 2 day(s)  Current Patient Status: Inpatient   Certification Statement: The patient will continue to require additional  inpatient hospital stay due to Pneumonia management  Discharge Plan: Anticipate discharge in 48-72 hrs to discharge location to be determined pending rehab evaluations.    Code Status: Level 1 - Full Code    Subjective   Patient seen at bedside. Patient is much more awake and calm. He could tell me his name, reason for hospitalization which seems like his baseline.     Objective :  Temp:  [98.1 °F (36.7 °C)-98.6 °F (37 °C)] 98.2 °F (36.8 °C)  HR:  [73-89] 89  BP: (116-143)/(54-62) 137/54  Resp:  [12-19] 16  SpO2:  [95 %-100 %] 95 %  O2 Device: None (Room air)    Body mass index is 24.61 kg/m².     Input and Output Summary (last 24 hours):     Intake/Output Summary (Last 24 hours) at 5/14/2025 0884  Last data filed at 5/14/2025 0826  Gross per 24 hour   Intake 50 ml   Output 1060 ml   Net -1010 ml       Physical Exam  HENT:      Head: Normocephalic and atraumatic.      Mouth/Throat:      Mouth: Mucous membranes are moist.     Cardiovascular:      Rate and Rhythm: Normal rate and regular rhythm.      Pulses: Normal pulses.      Heart sounds: Normal heart sounds.   Pulmonary:      Effort: Pulmonary effort is normal.      Breath sounds: Normal breath sounds.   Abdominal:      Palpations: Abdomen is soft.     Musculoskeletal:      Right lower leg: No edema.      Left lower leg: No edema.     Skin:     General: Skin is warm.     Neurological:      Mental Status: He is alert.      Comments: Oriented x2         Lines/Drains:  Lines/Drains/Airways       Active Status       Name Placement date Placement time Site Days    Suprapubic Catheter 20 Fr. 04/29/25  1100  --  14                            Lab Results: I have reviewed the following results:   Results from last 7 days   Lab Units 05/14/25  0606   WBC Thousand/uL 11.30*   HEMOGLOBIN g/dL 11.4*   HEMATOCRIT % 37.0   PLATELETS Thousands/uL 353   SEGS PCT % 61   LYMPHO PCT % 23   MONO PCT % 9   EOS PCT % 5     Results from last 7 days   Lab Units 05/14/25  0606  05/13/25  0543   SODIUM mmol/L 139 138   POTASSIUM mmol/L 3.5 3.7   CHLORIDE mmol/L 108 107   CO2 mmol/L 25 23   BUN mg/dL 11 21   CREATININE mg/dL 0.71 0.92   ANION GAP mmol/L 6 8   CALCIUM mg/dL 8.6 8.4   ALBUMIN g/dL  --  2.8*   TOTAL BILIRUBIN mg/dL  --  0.38   ALK PHOS U/L  --  57   ALT U/L  --  6*   AST U/L  --  13   GLUCOSE RANDOM mg/dL 105 96     Results from last 7 days   Lab Units 05/12/25  1622   INR  1.02     Results from last 7 days   Lab Units 05/13/25  0751   POC GLUCOSE mg/dl 97         Results from last 7 days   Lab Units 05/12/25  1755 05/12/25  1533   LACTIC ACID mmol/L 2.1* 3.8*   PROCALCITONIN ng/ml  --  0.15       Recent Cultures (last 7 days):   Results from last 7 days   Lab Units 05/13/25  1656 05/12/25  1538 05/12/25  1533   BLOOD CULTURE   --  No Growth at 24 hrs. No Growth at 24 hrs.   URINE CULTURE   --  >100,000 cfu/ml Staphylococcus aureus*  --    LEGIONELLA URINARY ANTIGEN  Negative  --   --              Last 24 Hours Medication List:     Current Facility-Administered Medications:     acetaminophen (TYLENOL) tablet 650 mg, Q6H PRN    albuterol (PROVENTIL HFA,VENTOLIN HFA) inhaler 2 puff, Q6H PRN    [Held by provider] amLODIPine (NORVASC) tablet 10 mg, Daily    aspirin (ECOTRIN LOW STRENGTH) EC tablet 81 mg, Daily    atorvastatin (LIPITOR) tablet 80 mg, Daily With Dinner    baclofen tablet 5 mg, TID    clopidogrel (PLAVIX) tablet 75 mg, Daily    enoxaparin (LOVENOX) subcutaneous injection 40 mg, Q24H CIERA    [Held by provider] furosemide (LASIX) tablet 40 mg, Daily    gabapentin (NEURONTIN) capsule 300 mg, TID    ipratropium (ATROVENT) 0.02 % inhalation solution 0.5 mg, TID    levalbuterol (XOPENEX) inhalation solution 1.25 mg, TID    [Held by provider] losartan (COZAAR) tablet 50 mg, Daily    mirtazapine (REMERON) tablet 7.5 mg, HS    pantoprazole (PROTONIX) EC tablet 40 mg, Daily    [COMPLETED] piperacillin-tazobactam (ZOSYN) 4.5 g in sodium chloride 0.9 % 100 mL IV LOADING DOSE,  Once, Last Rate: 4.5 g (05/12/25 1917) **FOLLOWED BY** piperacillin-tazobactam (ZOSYN) 4.5 g in sodium chloride 0.9 % 100 mL IVPB (EXTENDED INFUSION), Q8H, Last Rate: 4.5 g (05/14/25 0841)    Administrative Statements   Today, Patient Was Seen By: Nadia Galvan MD      **Please Note: This note may have been constructed using a voice recognition system.**

## 2025-05-14 NOTE — PLAN OF CARE
Problem: Potential for Falls  Goal: Patient will remain free of falls  Description: INTERVENTIONS:- Educate patient/family on patient safety including physical limitations- Instruct patient to call for assistance with activity - Consult OT/PT to assist with strengthening/mobility - Keep Call bell within reach- Keep bed low and locked with side rails adjusted as appropriate- Keep care items and personal belongings within reach- Initiate and maintain comfort rounds     Problem: Prexisting or High Potential for Compromised Skin Integrity  Goal: Skin integrity is maintained or improved  Description: INTERVENTIONS:- Identify patients at risk for skin breakdown- Assess and monitor skin integrity- Assess and monitor nutrition and hydration status- Monitor labs - Assess for incontinence - Turn and reposition patient- Assist with mobility/ambulation- Relieve pressure over bony prominences- Avoid friction and shearing- Provide appropriate hygiene as needed including keeping skin clean and dry- Evaluate need for skin moisturizer/barrier cream- Collaborate with interdisciplinary team - Patient/family teaching- Consider wound care consult   Outcome: Progressing

## 2025-05-14 NOTE — ASSESSMENT & PLAN NOTE
Wt Readings from Last 3 Encounters:   05/12/25 65 kg (143 lb 6.4 oz)   02/11/25 64.1 kg (141 lb 5 oz)   01/20/25 64.1 kg (141 lb 5 oz)   Hold Lasix in the setting of sepsis

## 2025-05-14 NOTE — SPEECH THERAPY NOTE
Speech Pathology - Modified Barium Swallow Study    Patient Name: Mathew Rico    Today's Date: 5/14/2025     Problem List  Principal Problem:    Sepsis (HCC)  Active Problems:    Hyperlipidemia    Primary hypertension    Multiple sclerosis (HCC)    Chronic diastolic congestive heart failure (HCC)    GERSON on CPAP    Type 2 diabetes mellitus without complication, without long-term current use of insulin (HCC)    ARMANDO (acute kidney injury) (HCC)    COPD (chronic obstructive pulmonary disease) (HCC)    Pneumonia    Past Medical History  Past Medical History:   Diagnosis Date    Acute laryngitis     Acute nonsuppurative otitis media, unspecified laterality     Arm weakness     Arthritis     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HCC)     Bladder infection     Bronchitis     Constipation     Cough     Diabetes (HCC)     Diabetes mellitus (HCC)     Dizziness     Dysfunction of eustachian tube     Erectile dysfunction of non-organic origin     Fatigue     Glaucoma     Hiatal hernia     Hypertension     Imbalance     Leg muscle spasm     Leukocytosis 04/01/2024    MS (multiple sclerosis) (HCC)     Multiple sclerosis (HCC)     Nausea and vomiting 02/16/2025    Nephrolithiasis     Neurogenic bladder     No natural teeth     Sinus pain     Spinal stenosis     Strain of thoracic region     Stroke (HCC)     Suprapubic catheter ([facility])      Past Surgical History  Past Surgical History:   Procedure Laterality Date    APPENDECTOMY      BRAIN SURGERY      Coil placed in aneurysm    CEREBRAL ANEURYSM REPAIR      CYSTOSCOPY      CYSTOSCOPY      CYSTOSCOPY  06/11/2018    CYSTOSCOPY  01/15/2021    EYE SURGERY      transscleral cyclophotocoagulation noncontact YAG laser    IR SUPRAPUBIC CATHETER CHECK/CHANGE/REINSERTION/UPSIZE  3/28/2024    MYRINGOTOMY      with ventilation tube insertion    NY LITHOLAPAXY SMPL/SM <2.5 CM N/A 5/7/2019    Procedure: CYSTOSCOPY, holmium laser litholapaxy of bladder stones, EXCHANGE OF SP TUBE;  Surgeon: Javy Jeffries  MD;  Location: [facility];  Service: Urology    SUPRAPUBIC CATHETER INSERTION         Assessment Summary:    Pt presents with mild-moderate oral dysphagia and WFL pharyngeal swallow characterized by Anterior spillage, inability to masticate solids, and reduced bolus control with premature spillage. Airway protection was adequate. Trace transient aspiration was seen with thin liquid x1, and no aspiration occurred on this study. Note: Images are available for review in PACS as desired.    Recommendations:   Recommended Diet: puree/level 1 diet and thin liquids   Recommended Form of Medications: whole with liquid   Aspiration precautions and compensatory swallowing strategies: upright posture  SLP Dysphagia therapy recommended: brief f/u    Results Reviewed with: patient and MD       General Information;  Pt is a 74 y.o. male with a PMH remarkable for MS, admitted with sepsis and PNA.  Current concerns for dysphagia include coughing with PO intake. MBS was recommended to assess oropharyngeal stage swallowing skills at this time.       Pt was viewed sitting upright in the lateral and AP positions. Trials administered were consistent with MBSImP Validated Protocol: Pt was given 5-mL thin liquid x2, 20-mL cup sip thin, 40-mL sequential swallow thin, 5-mL nectar thick, 20-mL cup sip nectar thick, 40-mL sequential swallow nectar thick, 5-mL honey thick, 5-mL pudding, ½ cookie coated with 3-mL pudding, 5-mL nectar thick in the AP position, and 5-mL pudding in the AP position. Pt was also given thin liquids by straw, as well as a barium tablet with thin liquid. Cookie was expectorated d/t inability to masticate.    Initial view observations/comments: Clear view of the upper airway      8-Point Penetration-Aspiration Scale   Thin liquid 2 - Material enters the airway, remains above the vocal folds, and is ejected  from the airway   Nectar thick liquid 1 - Material does not enter the airway   Honey thick liquid 1 - Material  does not enter the airway   Puree (pudding) 1 - Material does not enter the airway   Solid N/a     Aspiration Response and Efficacy:  No aspiration occurred on this study    MBS IMP Rating    ORAL Impairment  Compinent 1--Lip Closure  Judged at any point during the swallow.  4 - Escape beyond mid-chin    Component 2--Tongue Control During Bolus Hold  Judged on held liquid boluses only and prior to productive tongue movement.   1 - Escape to lateral buccal cavity/floor of mouth (FOM)    Component 3--Bolus Preparation/Mastication  Judged only during presentation of 1/2 shortbread cookie coated in pudding.   3 - Minimal chewing/mashing with majority of bolus unchewed    Component 4--Bolus Transport/Lingual Motion  Judged after first productive tongue movement for oral bolus transport.  0 - Brisk tongue motion    Component 5--Oral Residue  Judged after first swallow or after the last swallow of the sequential swallow task.  1 - Trace residue lining oral structures   Location   C - Tongue    Component 6--Initiation of Pharyngeal Swallow  Judged at first movement of the brisk superior-anterior hyoid trajectory.  3 - Bolus head in pyriforms      PHARYNGEAL Impairment  Component 7--Soft Palate Elevation  Judged during maximum displacement of soft palate.  0 - No bolus between the soft palate (SP)/pharyngeal wall (PW)    Component 8--Laryngeal Elevation  Judged when epiglottis is in its most horizontal position.  0 - Complete superior movement of thyroid cartilage with complete approximation of arytenoids to epiglottic petiole    Component 9--Anterior Hyoid Excursion  Judged at height of swallow/maximal anterior hyoid displacement.  0 - Complete anterior movement    Component 10--Epiglottic Movement  Judged at height of swallow/maximal anterior hyoid displacement.  0 - Complete inversion    Component 11--Laryngeal Vestibular Closure  Judged at height of swallow/maximal anterior hyoid displacement.  1 - Incomplete; narrow  column air/contrast in laryngeal vestibule    Component 12--Pharyngeal Stripping Wave  Judged during the full duration of the pharyngeal swallow.  0 - Present - complete    Component 13--Pharyngeal Contraction  Judged in AP view at rest and throughout maximum movement of structures.  0 - Complete    Component 14--Pharyngoesophageal Segment Opening  Judged during maximum distension of PES and throughout opening and closure.  1 - Partial distension/partial duration; partial obstruction to flow    Component 15--Tongue Base (TB) Retraction  Judged during maximum retraction of the tongue base.  1 - Trace column of contrast or air between TB and PW    Component 16--Pharyngeal Residue  Judged after first swallow or after the last swallow of the sequential swallow task.  1 - Trace residue within or on pharyngeal structures   Location   A - Tongue Base and E - Pyriform sinuses      ESOPHAGEAL Impairment  Component 17--Esophageal Clearance Upright Position  Judged in AP view during bolus transit through the oral cavity to the LES  0 - Complete clearance; esophageal coating

## 2025-05-14 NOTE — PLAN OF CARE
Problem: Potential for Falls  Goal: Patient will remain free of falls  Description: INTERVENTIONS:- Educate patient/family on patient safety including physical limitations- Instruct patient to call for assistance with activity - Consult OT/PT to assist with strengthening/mobility - Keep Call bell within reach- Keep bed low and locked with side rails adjusted as appropriate- Keep care items and personal belongings within reach- Initiate and maintain comfort rounds- Make Fall Risk  Problem: Prexisting or High Potential for Compromised Skin Integrity  Goal: Skin integrity is maintained or improved  Description: INTERVENTIONS:- Identify patients at risk for skin breakdown- Assess and monitor skin integrity- Assess and monitor nutrition and hydration status- Monitor labs - Assess for incontinence - Turn and reposition patient- Assist with mobility/ambulation- Relieve pressure over bony prominences- Avoid friction and shearing- Provide appropriate hygiene as needed including keeping skin clean and dry- Evaluate need for skin moisturizer/barrier cream- Collaborate with interdisciplinary team - Patient/family teaching- Consider wound care consult   Outcome: Progressing

## 2025-05-14 NOTE — ASSESSMENT & PLAN NOTE
Lab Results   Component Value Date    WBC 11.30 (H) 05/14/2025    WBC 15.57 (H) 05/13/2025    WBC 18.68 (H) 05/12/2025    PROCALCITONI 0.15 05/12/2025    PROCALCITONI <0.05 02/10/2025    PROCALCITONI 0.09 01/22/2025    LACTICACID 2.1 (H) 05/12/2025    LACTICACID 3.8 (H) 05/12/2025    LACTICACID 2.1 (H) 02/11/2025   74-year-old male with past medical history of hypertension, hyperlipidemia and diabetes, COPD, CVA, CHF, MS with neurogenic bladder and chronic Cage presenting with change in mental status And cough.  Upon admission labs were significant for elevated white count 18 p.o.,tachycardia, tachypnea, elevated lactic acid 3.8.  CT chest abdomen pelvis with contrast 05/12/2025: Possible aspiration pneumonia, Marked bladder wall thickening possible cystitis  Patient has had multiple admissions for UTI 2/2 multi-drug resisatent organism. Was treated with Ertapenem during last admission at [facility] on 04/03, prior to that it was on 02/10.   Procalcitonin: Normal  S/p 2 L of IV fluid bolus and maintenance fluid  As per speech eval: Mild to moderate dysphagia. Puree and thick nectar ordered.     Pending blood cultures:negative  Uculture positive for staph could be a contaminate  Video barium swallow: no dysphagia   Most likely aspiration pneumonia versus UTI(given extensive history of prior UTIs and likely drug-resistant organism)  Started on Zosyn 4.5 g every 8 hours for 5 days  Urology was consulted for catheter exchange: No catheter exchange during this time.    Plan  Trend wbc and fever curve  Continue IV Zosyn 4 g every 8 hours D3/5  Appreciate Speech therapy recommendations  Repeat procalc

## 2025-05-14 NOTE — PLAN OF CARE
Problem: OCCUPATIONAL THERAPY ADULT  Goal: Performs self-care activities at highest level of function for planned discharge setting.  See evaluation for individualized goals.  Description: Treatment Interventions: ADL retraining, Functional transfer training, Visual perceptual retraining, UE strengthening/ROM, Endurance training, Cognitive reorientation, Patient/family training, Equipment evaluation/education, Compensatory technique education, Continued evaluation, Energy conservation, Activityengagement          See flowsheet documentation for full assessment, interventions and recommendations.   5/14/2025 1448 by Iris Campbell OT  Note: Limitation: Decreased ADL status, Decreased UE strength, Decreased Safe judgement during ADL, Decreased cognition, Decreased endurance, Visual deficit, Decreased self-care trans, Decreased high-level ADLs  Prognosis: Fair  Assessment: Pt is a 73 y/o male seen for OT eval s/p adm to [facility] w/  change in mental status and cough. Pt is dx'd w/ sepsis.  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[facility]), Bladder infection, Bronchitis, Constipation, Cough, Diabetes ([facility]), Diabetes mellitus (HCC), Dizziness, Dysfunction of eustachian tube, Erectile dysfunction of non-organic origin, Fatigue, Glaucoma, Hiatal hernia, Hypertension, Imbalance, Leg muscle spasm, Leukocytosis (04/01/2024), MS (multiple sclerosis) ([facility]), Multiple sclerosis ([facility]), Nausea and vomiting (02/16/2025), Nephrolithiasis, Neurogenic bladder, No natural teeth, Sinus pain, Spinal stenosis, Strain of thoracic region, Stroke ([facility]), and Suprapubic catheter ([facility]). Pt with active OT orders and activity as tolerated orders. Pt lives with [facility] staff @ [facility]; LTC resident. Pt has assist w/ ADLS and IADLS, does not drive & required use of DME PTA including RW and w/c. Pt is currently  demonstrating the following occupational deficits: Min A UB ADLS, Mod-Max A LB ADLS, Max A x2 bed mobility and transfers w/ B/L HHA and knee block. These deficits that are impacting pt's baseline areas of occupation are a result of the following impairments: pain, endurance, activity tolerance, functional mobility, forward functional reach, balance, trunk control, functional standing tolerance, decreased I w/ ADLS/IADLS, strength, cognitive impairments, decreased safety awareness, and decreased insight into deficits. The following Occupational Performance Areas to address include: eating, grooming, bathing/shower, toilet hygiene, dressing, medication management, functional mobility, and clothing management. Recommend minimum resource intensity (level lll) upon D/C (return to facility w/ rehab services). Pt to continue to benefit from acute immediate OT services to address the following goals 2-3x/week to  w/in 10-14 days:     Rehab Resource Intensity Level, OT: III (Minimum Resource Intensity) (return to facility w/ rehab services)       2025 1447 by Iris Campbell OT  Note: Limitation: Decreased ADL status, Decreased UE strength, Decreased Safe judgement during ADL, Decreased cognition, Decreased endurance, Visual deficit, Decreased self-care trans, Decreased high-level ADLs  Prognosis: Fair  Assessment: Pt is a 73 y/o male seen for OT eval s/p adm to [facility] w/  change in mental status and cough. Pt is dx'd w/ sepsis.  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[facility]), Bladder infection, Bronchitis, Constipation, Cough, Diabetes ([facility]), Diabetes mellitus ([facility]), Dizziness, Dysfunction of eustachian tube, Erectile dysfunction of non-organic origin, Fatigue, Glaucoma, Hiatal hernia, Hypertension, Imbalance, Leg muscle spasm, Leukocytosis (2024), MS (multiple sclerosis) ([facility]), Multiple  sclerosis (HCC), Nausea and vomiting (2025), Nephrolithiasis, Neurogenic bladder, No natural teeth, Sinus pain, Spinal stenosis, Strain of thoracic region, Stroke (HCC), and Suprapubic catheter (HCC). Pt with active OT orders and activity as tolerated orders. Pt lives with facility staff @ [facility]; LTC resident. Pt has assist w/ ADLS and IADLS, does not drive & required use of DME PTA including RW and w/c. Pt is currently demonstrating the following occupational deficits: Min A UB ADLS, Mod-Max A LB ADLS, Max A x2 bed mobility and transfers w/ B/L HHA and knee block. These deficits that are impacting pt's baseline areas of occupation are a result of the following impairments: pain, endurance, activity tolerance, functional mobility, forward functional reach, balance, trunk control, functional standing tolerance, decreased I w/ ADLS/IADLS, strength, cognitive impairments, decreased safety awareness, and decreased insight into deficits. The following Occupational Performance Areas to address include: eating, grooming, bathing/shower, toilet hygiene, dressing, medication management, functional mobility, and clothing management. Recommend minimum resource intensity (level lll) upon D/C (return to facility w/ rehab services). Pt to continue to benefit from acute immediate OT services to address the following goals 2-3x/week to  w/in 10-14 days:     Rehab Resource Intensity Level, OT: III (Minimum Resource Intensity) (return to facility w/ rehab services)     Iris Campbell MS, OTR/L

## 2025-05-14 NOTE — PHYSICAL THERAPY NOTE
Physical Therapy Evaluation    Patient Name: Mathew Rico    Today's Date: 5/14/2025     Problem List  Principal Problem:    Sepsis (HCC)  Active Problems:    Hyperlipidemia    Primary hypertension    Multiple sclerosis (HCC)    Chronic diastolic congestive heart failure (HCC)    GERSON on CPAP    Type 2 diabetes mellitus without complication, without long-term current use of insulin (HCC)    ARMANDO (acute kidney injury) (HCC)    COPD (chronic obstructive pulmonary disease) (HCC)    Pneumonia       Past Medical History  Past Medical History:   Diagnosis Date    Acute laryngitis     Acute nonsuppurative otitis media, unspecified laterality     Arm weakness     Arthritis     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HCC)     Bladder infection     Bronchitis     Constipation     Cough     Diabetes (HCC)     Diabetes mellitus (HCC)     Dizziness     Dysfunction of eustachian tube     Erectile dysfunction of non-organic origin     Fatigue     Glaucoma     Hiatal hernia     Hypertension     Imbalance     Leg muscle spasm     Leukocytosis 04/01/2024    MS (multiple sclerosis) (HCC)     Multiple sclerosis (HCC)     Nausea and vomiting 02/16/2025    Nephrolithiasis     Neurogenic bladder     No natural teeth     Sinus pain     Spinal stenosis     Strain of thoracic region     Stroke ([facility])     Suprapubic catheter ([facility])         Past Surgical History  Past Surgical History:   Procedure Laterality Date    APPENDECTOMY      BRAIN SURGERY      Coil placed in aneurysm    CEREBRAL ANEURYSM REPAIR      CYSTOSCOPY      CYSTOSCOPY      CYSTOSCOPY  06/11/2018    CYSTOSCOPY  01/15/2021    EYE SURGERY      transscleral cyclophotocoagulation noncontact YAG laser    IR SUPRAPUBIC CATHETER CHECK/CHANGE/REINSERTION/UPSIZE  3/28/2024    MYRINGOTOMY      with ventilation tube insertion    NC LITHOLAPAXY SMPL/SM <2.5 CM N/A 5/7/2019    Procedure: CYSTOSCOPY, holmium laser litholapaxy of bladder stones, EXCHANGE  OF SP TUBE;  Surgeon: Javy Jeffries MD;  Location: [facility];  Service: Urology    SUPRAPUBIC CATHETER INSERTION             05/14/25 1140   PT Last Visit   PT Visit Date 05/14/25   Note Type   Note type Evaluation   Pain Assessment   Pain Assessment Tool 0-10   Pain Score 4   Pain Location/Orientation Location: Abdomen   Hospital Pain Intervention(s) Repositioned;Ambulation/increased activity   Restrictions/Precautions   Weight Bearing Precautions Per Order No   Other Precautions Contact/isolation;Cognitive;Chair Alarm;Bed Alarm;Fall Risk;Pain;Visual impairment  (MDRO)   Home Living   [facility] of Home SNF  ([facility])   Home Layout One level;Performs ADLs on one level;Able to live on main level with bedroom/bathroom   Bathroom Shower/Tub Walk-in shower   Bathroom Toilet Raised   Bathroom Equipment Grab bars in shower;Shower chair;Grab bars around toilet   Bathroom Accessibility Accessible   Home Equipment Walker;Wheelchair-manual   Prior Function   Level of Ponder Needs assistance with ADLs;Needs assistance with functional mobility;Needs assistance with IADLS   Lives With Facility staff   Receives Help From Other (Comment)  (facility staff)   IADLs Family/Friend/Other provides transportation;Family/Friend/Other provides meals;Family/Friend/Other provides medication management   Vocational Retired   Comments Per CM note, pt is Min A UB ADLS, Total A LB ADLS, Ax1 grooming, toileting and dressing, Max A for bed mobility. Per pt, they do not get him OOB @ SNF; however per past notes from previous admission in Feb 2025, pt was able to stand pivot to w/c w/ stand by assist   General   Family/Caregiver Present No   Cognition   Overall Cognitive Status Impaired   Arousal/Participation Alert   Attention Attends with cues to redirect   Orientation Level Oriented to person;Disoriented to place;Disoriented to time;Disoriented to situation   Memory Decreased recall of precautions;Decreased recall of recent  events;Decreased short term memory;Decreased recall of biographical information   Following Commands Follows one step commands with increased time or repetition   Comments pt pleasantly confused and cooperative. Decreased insight into current deficits. Able to follow simple one step commands w/ inc time   RLE Assessment   RLE Assessment X  (3-/5 DF/PF strength; 1/5 strength at hip/knee)   LLE Assessment   LLE Assessment X  (3-/5 DF/PF strength; 1/5 strength at hip/knee)   Light Touch   RLE Light Touch Impaired   LLE Light Touch Impaired   Bed Mobility   Supine to Sit 2  Maximal assistance   Additional items Assist x 2;Increased time required;Verbal cues;LE management;HOB elevated   Sit to Supine 2  Maximal assistance   Additional items Assist x 2;HOB elevated;Increased time required;Verbal cues;LE management   Additional Comments Pt supine in bed on arrival. pt sat EOB w/ min A for 1-2 min. pt returned to supine with call bell and all needs following PT session   Transfers   Sit to Stand 2  Maximal assistance   Additional items Assist x 2;Increased time required;Verbal cues   Stand to Sit 2  Maximal assistance   Additional items Assist x 2;Increased time required;Verbal cues   Additional Comments Transfers w/ b/l HHA and b/l knees blocked. Able to achieve full stand for 2-3 seconds   Ambulation/Elevation   Gait pattern Not appropriate;Not tested   Ambulation/Elevation Additional Comments pt unable to take steps at this time   Balance   Static Sitting Poor +   Dynamic Sitting Poor   Static Standing Poor -   Dynamic Standing Poor -   Ambulatory Zero   Endurance Deficit   Endurance Deficit Yes   Endurance Deficit Description LE weakness, generalized fatigue   Activity Tolerance   Activity Tolerance Patient limited by fatigue   Medical Staff Made Aware KARLA ROQUE Eve- co eval 2* medical complexity and multiple co-morbidities   Nurse Made Aware RN cleared   Assessment   Prognosis Fair   Problem List Decreased  strength;Decreased range of motion;Decreased endurance;Impaired balance;Decreased mobility;Decreased cognition;Impaired judgement;Decreased safety awareness;Pain   Assessment Pt is a 74 y.o. male seen for PT evaluation s/p admit to [facility] on [facility]12/[facility]. Pt was admitted with a primary dx of: sepsis.  PT now consulted for assessment of mobility and d/c needs.  Pts current comorbidities effecting treatment include: MS, COPD, Pneumonia and CHF. Pts current clinical presentation is Unstable/ Unpredictable (high complexity) due to Ongoing medical management for primary dx, Increased reliance on more restrictive AD compared to baseline, Decreased activity tolerance compared to baseline, Fall risk, Increased assistance needed from caregiver at current time, Cog status, Continuous pulse oximetry monitoring . Prior to admission, pt was living in SNF and required assist for all mobility and ADLs. Upon evaluation, pt currently is requiring max Ax2 for bed mobility; max Ax2 for transfers. Pt presents at PT eval functioning below baseline and currently w/ overall mobility deficits 2* to: BLE weakness, decreased ROM, impaired balance, decreased endurance, impaired coordination, gait deviations, pain, decreased activity tolerance compared to baseline, decreased functional mobility tolerance compared to baseline, decreased safety awareness, impaired judgement, fall risk, decreased cognition. Pt currently at a fall risk 2* to impairments listed above.  Pt will continue to benefit from skilled acute PT interventions to address stated impairments; to maximize functional mobility; for ongoing pt/ family training; and DME needs. At conclusion of PT session pt returned BTB, bed alarm engaged, and all needs in reach with phone and call bell within reach. Pt denies any further questions at this time. The patient's AM-PAC Basic Mobility Inpatient Short Form Raw Score is 7. A Raw score of less than or equal to 16 suggests the  patient may benefit from discharge to post-acute rehabilitation services. Please also refer to the recommendation of the Physical Therapist for safe discharge planning. Recommend return to facility w/ rehab services upon hospital D/C.   Goals   Patient Goals to eat lunch   STG Expiration Date 05/28/25   Short Term Goal #1 STG 1. Pt will be able to perform bed mobility tasks with min A in order to improve overall functional mobility and assist in safe d/c. STG 2. Pt with sit EOB for at least 25 minutes at mod I level in order to strengthen abdominal musculature and assist in future transfers/ ambulation. STG 3. Pt will be able to perform functional transfer with mod A in order to improve overall functional mobility and assist in safe d/c. STG 4. Pt will be able to ambulate at least 3 feet with least restrictive device with mod A in order to improve overall functional mobility and assist in safe d/c. STG 5. Pt will improve sitting/standing static/dynamic balance 1/2 grade in order to improve functional mobility and assist in safe d/c. STG 6. Pt will improve LE strength by 1/2 grade in order to improve functional mobility and assist in safe d/c.   PT Treatment Day 0   Plan   Treatment/Interventions Functional transfer training;LE strengthening/ROM;Therapeutic exercise;Endurance training;Cognitive reorientation;Patient/family training;Equipment eval/education;Bed mobility;Gait training;Spoke to nursing;Spoke to case management;OT   PT Frequency 1-2x/wk   Discharge Recommendation   Rehab Resource Intensity Level, PT III (Minimum Resource Intensity)  (return to facility w/ therapy services)   Equipment Recommended Walker;Wheelchair  (owns)   AM-PAC Basic Mobility Inpatient   Turning in Flat Bed Without Bedrails 2   Lying on Back to Sitting on Edge of Flat Bed Without Bedrails 1   Moving Bed to Chair 1   Standing Up From Chair Using Arms 1   Walk in Room 1   Climb 3-5 Stairs With Railing 1   Basic Mobility Inpatient Raw  Score 7   Turning Head Towards Sound 4   Follow Simple Instructions 2   Low Function Basic Mobility Raw Score  13   Low Function Basic Mobility Standardized Score  20.14   [facility] Level Of Mobility   -[facility] Goal 2: Bed activities/Dependent transfer   -[facility] Achieved 3: Sit at edge of bed   Modified Wilder Scale   Modified Wilder Scale 4   End of Consult   Patient Position at End of Consult Bed/Chair alarm activated;All needs within reach;Supine     Karla Russ PT, DPT

## 2025-05-14 NOTE — OCCUPATIONAL THERAPY NOTE
Occupational Therapy Evaluation     Patient Name: Mathew Rico  Today's Date: 5/14/2025  Problem List  Principal Problem:    Sepsis (HCC)  Active Problems:    Hyperlipidemia    Primary hypertension    Multiple sclerosis (HCC)    Chronic diastolic congestive heart failure (HCC)    GERSON on CPAP    Type 2 diabetes mellitus without complication, without long-term current use of insulin (HCC)    ARMANDO (acute kidney injury) (HCC)    COPD (chronic obstructive pulmonary disease) (HCC)    Pneumonia    Past Medical History  Past Medical History:   Diagnosis Date    Acute laryngitis     Acute nonsuppurative otitis media, unspecified laterality     Arm weakness     Arthritis     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HCC)     Bladder infection     Bronchitis     Constipation     Cough     Diabetes (HCC)     Diabetes mellitus (HCC)     Dizziness     Dysfunction of eustachian tube     Erectile dysfunction of non-organic origin     Fatigue     Glaucoma     Hiatal hernia     Hypertension     Imbalance     Leg muscle spasm     Leukocytosis 04/01/2024    MS (multiple sclerosis) (HCC)     Multiple sclerosis (HCC)     Nausea and vomiting 02/16/2025    Nephrolithiasis     Neurogenic bladder     No natural teeth     Sinus pain     Spinal stenosis     Strain of thoracic region     Stroke (HCC)     Suprapubic catheter (HCC)      Past Surgical History  Past Surgical History:   Procedure Laterality Date    APPENDECTOMY      BRAIN SURGERY      Coil placed in aneurysm    CEREBRAL ANEURYSM REPAIR      CYSTOSCOPY      CYSTOSCOPY      CYSTOSCOPY  06/11/2018    CYSTOSCOPY  01/15/2021    EYE SURGERY      transscleral cyclophotocoagulation noncontact YAG laser    IR SUPRAPUBIC CATHETER CHECK/CHANGE/REINSERTION/UPSIZE  3/28/2024    MYRINGOTOMY      with ventilation tube insertion    KY LITHOLAPAXY SMPL/SM <2.5 CM N/A 5/7/2019    Procedure: CYSTOSCOPY, holmium laser litholapaxy of bladder stones, EXCHANGE OF SP TUBE;  Surgeon: Javy Jeffries MD;  Location: [facility];  Service: Urology    SUPRAPUBIC CATHETER INSERTION             05/14/25 1203   OT Last Visit   OT Visit Date 05/14/25   Note Type   Note type Evaluation   Pain Assessment   Pain Assessment Tool 0-10   Pain Score 4   Pain Location/Orientation Location: Abdomen   Pain Onset/Description Onset: Ongoing;Descriptor: Aching;Descriptor: Discomfort   Patient's Stated Pain Goal No pain   Hospital Pain Intervention(s) Repositioned;Ambulation/increased activity;Emotional support   Restrictions/Precautions   Weight Bearing Precautions Per Order No   Other Precautions Contact/isolation;Cognitive;Chair Alarm;Bed Alarm;Multiple lines;Telemetry;Fall Risk;Pain;Visual impairment   Home Living   Type of Home SNF  ([facility])   Home Layout One level;Able to live on main level with bedroom/bathroom;Elevator;Ramped entrance   Bathroom Shower/Tub Walk-in shower   Bathroom Toilet Raised   Bathroom Equipment Grab bars in shower;Grab bars around toilet;Shower chair   Bathroom Accessibility Accessible   Home Equipment Walker;Wheelchair-manual   Prior Function   Level of [place] Needs assistance with functional mobility;Needs assistance with ADLs;Needs assistance with IADLS   Lives With Facility staff   Receives Help From Other (Comment)  (facility staff)   IADLs Family/Friend/Other provides transportation;Family/Friend/Other provides meals;Family/Friend/Other provides medication management   Vocational Retired   Comments Per CM note, pt is Min A UB ADLS, Total A LB ADLS, Ax1 grooming, toileting and dressing, Max A for bed mobility. Per pt, they do not get him OOB @ SNF; however per past notes from previous admission in Feb 2025, pt was able to stand pivot to w/c w/ stand by assist   Lifestyle   Autonomy Assist ADLS/IADLS, transfers and functional mobility PTA   Reciprocal Relationships Pt lives w/ facility staff   Service to Others Retired   Intrinsic Gratification TV   ADL   Eating Assistance 5  Supervision/Setup    Eating Deficit Setup;Pureed diet  (setup w/ lunch post eval)   Grooming Assistance 4  Minimal Assistance   UB Bathing Assistance 4  Minimal Assistance   LB Bathing Assistance 3  Moderate Assistance   UB Dressing Assistance 4  Minimal Assistance   LB Dressing Assistance 2  Maximal Assistance   Toileting Assistance  2  Maximal Assistance   Functional Assistance 2  Maximal Assistance   Functional Deficit Steadying;Verbal cueing;Supervision/safety;Increased time to complete  (Ax2)   Bed Mobility   Supine to Sit 2  Maximal assistance   Additional items Assist x 2;HOB elevated;Increased time required;LE management;Verbal cues   Sit to Supine 2  Maximal assistance   Additional items Assist x 2;Increased time required;Verbal cues;LE management   Additional Comments pt sat EOB w/ Min A for sitting balance/trunk control   Transfers   Sit to Stand 2  Maximal assistance   Additional items Assist x 2;Increased time required;Verbal cues   Stand to Sit 2  Maximal assistance   Additional items Assist x 2;Increased time required;Verbal cues   Additional Comments B/L HHA used; VC for safety; B/L knee block   Functional Mobility   Additional Comments Unable to assess @ this time   Balance   Static Sitting Poor +   Dynamic Sitting Poor   Static Standing Poor -   Dynamic Standing Poor -   Ambulatory Zero   Activity Tolerance   Activity Tolerance Patient limited by fatigue;Patient limited by pain   Medical Staff Made Aware PT, Karla, JUDE, Eve   Nurse Made Aware yes   RUE Assessment   RUE Assessment   (ROM WFL)   LUE Assessment   LUE Assessment   (ROM WFL)   Hand Function   Gross Motor Coordination Functional   Fine Motor Coordination Functional   Vision - Complex Assessment   Ocular Range of Motion Intact   Tracking Impaired   Acuity   (reports  name badge is blurry to read)   Cognition   Overall Cognitive Status Impaired   Arousal/Participation Responsive;Cooperative   Attention Attends with cues to redirect   Orientation Level  Oriented to person;Disoriented to place;Disoriented to time;Disoriented to situation   Memory Decreased recall of precautions;Decreased recall of recent events;Decreased short term memory   Following Commands Follows one step commands with increased time or repetition   Comments pt is pleasant and cooperative; has decreased understanding of deficits and safety awareness   Assessment   Limitation Decreased ADL status;Decreased UE strength;Decreased Safe judgement during ADL;Decreased cognition;Decreased endurance;Visual deficit;Decreased self-care trans;Decreased high-level ADLs   Prognosis Fair   Assessment Pt is a 75 y/o male seen for OT eval s/p adm to [facility] w/  change in mental status and cough. Pt is dx'd w/ sepsis.  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[facility]), Bladder infection, Bronchitis, Constipation, Cough, Diabetes ([facility]), Diabetes mellitus ([facility]), Dizziness, Dysfunction of eustachian tube, Erectile dysfunction of non-organic origin, Fatigue, Glaucoma, Hiatal hernia, Hypertension, Imbalance, Leg muscle spasm, Leukocytosis (04/01/2024), MS (multiple sclerosis) ([facility]), Multiple sclerosis ([facility]), Nausea and vomiting (02/16/2025), Nephrolithiasis, Neurogenic bladder, No natural teeth, Sinus pain, Spinal stenosis, Strain of thoracic region, Stroke ([facility]), and Suprapubic catheter ([facility]). Pt with active OT orders and activity as tolerated orders. Pt lives with facility staff @ [facility]; LTC resident. Pt has assist w/ ADLS and IADLS, does not drive & required use of DME PTA including RW and w/c. Pt is currently demonstrating the following occupational deficits: Min A UB ADLS, Mod-Max A LB ADLS, Max A x2 bed mobility and transfers w/ B/L HHA and knee block. These deficits that are impacting pt's baseline areas of occupation are a result of the following impairments: pain, endurance, activity  tolerance, functional mobility, forward functional reach, balance, trunk control, functional standing tolerance, decreased I w/ ADLS/IADLS, strength, cognitive impairments, decreased safety awareness, and decreased insight into deficits. The following Occupational Performance Areas to address include: eating, grooming, bathing/shower, toilet hygiene, dressing, medication management, functional mobility, and clothing management. Recommend minimum resource intensity (level lll) upon D/C (return to facility w/ rehab services). Pt to continue to benefit from acute immediate OT services to address the following goals 2-3x/week to  w/in 10-14 days:   Goals   Patient Goals to eat lunch   LTG Time Frame 10-   Long Term Goal #1 see below listed goals   Plan   Treatment Interventions ADL retraining;Functional transfer training;Visual perceptual retraining;UE strengthening/ROM;Endurance training;Cognitive reorientation;Patient/family training;Equipment evaluation/education;Compensatory technique education;Continued evaluation;Energy conservation;Activityengagement   Goal Expiration Date 25   OT Frequency 2-3x/wk   Discharge Recommendation   Rehab Resource Intensity Level, OT III (Minimum Resource Intensity)  (return to facility w/ rehab services)   Additional Comments  The patient's raw score on the -PAC Daily Activity Inpatient Short Form is 15. A raw score of less than 19 suggests the patient may benefit from discharge to post-acute rehabilitation services. Please refer to the recommendation of the Occupational Therapist for safe discharge planning.   Additional Comments 2 Pt seen as a co-session due to the patient's co-morbidities, clinically unstable presentation, and present impairments which are a regression from the patient's baseline.   AM-PAC Daily Activity Inpatient   Lower Body Dressing 2   Bathing 2   Toileting 2   Upper Body Dressing 3   Grooming 3   Eating 3   Daily Activity Raw Score 15   Daily  Activity Standardized Score (Calc for Raw Score >=11) 34.69   AM-PAC Applied Cognition Inpatient   Following a Speech/Presentation 2   Understanding Ordinary Conversation 3   Taking Medications 3   Remembering Where Things Are Placed or Put Away 3   Remembering List of 4-5 Errands 2   Taking Care of Complicated Tasks 2   Applied Cognition Raw Score 15   Applied Cognition Standardized Score 33.54   End of Consult   Education Provided Yes   Patient Position at End of Consult Supine;All needs within reach;Bed/Chair alarm activated   Nurse Communication Nurse aware of consult     GOALS    1) Pt will complete rolling left/right in bed with Mod assist, as prerequisite for further engagement in ADLS   2) Pt will complete supine to sit transfer with Mod A using B/L UEs to initiate bed mobility   3) Pt will tolerate sitting at EOB 20 minutes with S assist and stable vital signs, as prerequisite for further engagement in ADLS   4) Pt will complete grooming task with S assist and increased time to increase independence in functional tasks  5) Pt will increase B/L UE ROM 1/2 MMT to increase functional UB use during ADLS   6) Pt will complete UB ADLS with S and use of AD/DME as needed to increase independence in functional tasks  7) Pt will complete LB ADLS with Mod A and use of AD/DME as needed to increase independence in functional tasks  8) Pt will complete sit to stand transfer / sit pivot transfer / stand pivot transfer with Mod assist on/off all ADL surfaces   9) Pt will follow 100% simple one step verbal commands and be A/Ox4 consistently t/o use of external environmental cues to increase awareness for functional tasks  10) Pt will demonstrate 100% carryover of E.C. techniques t/o fx'l I/ADL/ leisure tasks w/o cues s/p skilled education    Iris Campbell MS, OTR/L

## 2025-05-15 LAB
ANION GAP SERPL CALCULATED.3IONS-SCNC: 6 MMOL/L (ref 4–13)
BASOPHILS # BLD AUTO: 0.09 THOUSANDS/ÂΜL (ref 0–0.1)
BASOPHILS NFR BLD AUTO: 1 % (ref 0–1)
BUN SERPL-MCNC: 9 MG/DL (ref 5–25)
CALCIUM SERPL-MCNC: 8.6 MG/DL (ref 8.4–10.2)
CHLORIDE SERPL-SCNC: 109 MMOL/L (ref 96–108)
CO2 SERPL-SCNC: 25 MMOL/L (ref 21–32)
CREAT SERPL-MCNC: 0.57 MG/DL (ref 0.6–1.3)
EOSINOPHIL # BLD AUTO: 0.72 THOUSAND/ÂΜL (ref 0–0.61)
EOSINOPHIL NFR BLD AUTO: 6 % (ref 0–6)
ERYTHROCYTE [DISTWIDTH] IN BLOOD BY AUTOMATED COUNT: 16 % (ref 11.6–15.1)
GFR SERPL CREATININE-BSD FRML MDRD: 100 ML/MIN/1.73SQ M
GLUCOSE SERPL-MCNC: 96 MG/DL (ref 65–140)
HCT VFR BLD AUTO: 32.6 % (ref 36.5–49.3)
HGB BLD-MCNC: 10.7 G/DL (ref 12–17)
IMM GRANULOCYTES # BLD AUTO: 0.14 THOUSAND/UL (ref 0–0.2)
IMM GRANULOCYTES NFR BLD AUTO: 1 % (ref 0–2)
LYMPHOCYTES # BLD AUTO: 3.1 THOUSANDS/ÂΜL (ref 0.6–4.47)
LYMPHOCYTES NFR BLD AUTO: 25 % (ref 14–44)
MCH RBC QN AUTO: 26.6 PG (ref 26.8–34.3)
MCHC RBC AUTO-ENTMCNC: 32.8 G/DL (ref 31.4–37.4)
MCV RBC AUTO: 81 FL (ref 82–98)
MONOCYTES # BLD AUTO: 0.82 THOUSAND/ÂΜL (ref 0.17–1.22)
MONOCYTES NFR BLD AUTO: 7 % (ref 4–12)
NEUTROPHILS # BLD AUTO: 7.44 THOUSANDS/ÂΜL (ref 1.85–7.62)
NEUTS SEG NFR BLD AUTO: 60 % (ref 43–75)
NRBC BLD AUTO-RTO: 0 /100 WBCS
PLATELET # BLD AUTO: 394 THOUSANDS/UL (ref 149–390)
PMV BLD AUTO: 7.9 FL (ref 8.9–12.7)
POTASSIUM SERPL-SCNC: 3.2 MMOL/L (ref 3.5–5.3)
PROCALCITONIN SERPL-MCNC: 0.1 NG/ML
RBC # BLD AUTO: 4.02 MILLION/UL (ref 3.88–5.62)
SODIUM SERPL-SCNC: 140 MMOL/L (ref 135–147)
WBC # BLD AUTO: 12.31 THOUSAND/UL (ref 4.31–10.16)

## 2025-05-15 PROCEDURE — 94760 N-INVAS EAR/PLS OXIMETRY 1: CPT

## 2025-05-15 PROCEDURE — 99232 SBSQ HOSP IP/OBS MODERATE 35: CPT | Performed by: STUDENT IN AN ORGANIZED HEALTH CARE EDUCATION/TRAINING PROGRAM

## 2025-05-15 PROCEDURE — 80048 BASIC METABOLIC PNL TOTAL CA: CPT | Performed by: STUDENT IN AN ORGANIZED HEALTH CARE EDUCATION/TRAINING PROGRAM

## 2025-05-15 PROCEDURE — 94640 AIRWAY INHALATION TREATMENT: CPT

## 2025-05-15 PROCEDURE — 85025 COMPLETE CBC W/AUTO DIFF WBC: CPT | Performed by: STUDENT IN AN ORGANIZED HEALTH CARE EDUCATION/TRAINING PROGRAM

## 2025-05-15 PROCEDURE — 94668 MNPJ CHEST WALL SBSQ: CPT

## 2025-05-15 PROCEDURE — 84145 PROCALCITONIN (PCT): CPT | Performed by: STUDENT IN AN ORGANIZED HEALTH CARE EDUCATION/TRAINING PROGRAM

## 2025-05-15 PROCEDURE — 94664 DEMO&/EVAL PT USE INHALER: CPT

## 2025-05-15 RX ORDER — POTASSIUM CHLORIDE 1500 MG/1
40 TABLET, EXTENDED RELEASE ORAL ONCE
Status: COMPLETED | OUTPATIENT
Start: 2025-05-15 | End: 2025-05-15

## 2025-05-15 RX ORDER — AMLODIPINE BESYLATE 10 MG/1
10 TABLET ORAL DAILY
Status: DISCONTINUED | OUTPATIENT
Start: 2025-05-15 | End: 2025-05-21 | Stop reason: HOSPADM

## 2025-05-15 RX ADMIN — IPRATROPIUM BROMIDE 0.5 MG: 0.5 SOLUTION RESPIRATORY (INHALATION) at 20:14

## 2025-05-15 RX ADMIN — BACLOFEN 5 MG: 10 TABLET ORAL at 07:35

## 2025-05-15 RX ADMIN — AMLODIPINE BESYLATE 10 MG: 10 TABLET ORAL at 11:36

## 2025-05-15 RX ADMIN — MIRTAZAPINE 7.5 MG: 15 TABLET, FILM COATED ORAL at 21:06

## 2025-05-15 RX ADMIN — GABAPENTIN 300 MG: 300 CAPSULE ORAL at 07:35

## 2025-05-15 RX ADMIN — ENOXAPARIN SODIUM 40 MG: 40 INJECTION SUBCUTANEOUS at 07:35

## 2025-05-15 RX ADMIN — GABAPENTIN 300 MG: 300 CAPSULE ORAL at 15:34

## 2025-05-15 RX ADMIN — CEFTRIAXONE SODIUM 1000 MG: 10 INJECTION, POWDER, FOR SOLUTION INTRAVENOUS at 15:34

## 2025-05-15 RX ADMIN — LEVALBUTEROL HYDROCHLORIDE 1.25 MG: 1.25 SOLUTION RESPIRATORY (INHALATION) at 14:31

## 2025-05-15 RX ADMIN — GABAPENTIN 300 MG: 300 CAPSULE ORAL at 21:06

## 2025-05-15 RX ADMIN — IPRATROPIUM BROMIDE 0.5 MG: 0.5 SOLUTION RESPIRATORY (INHALATION) at 14:31

## 2025-05-15 RX ADMIN — BACLOFEN 5 MG: 10 TABLET ORAL at 21:06

## 2025-05-15 RX ADMIN — LEVALBUTEROL HYDROCHLORIDE 1.25 MG: 1.25 SOLUTION RESPIRATORY (INHALATION) at 08:16

## 2025-05-15 RX ADMIN — POTASSIUM CHLORIDE 40 MEQ: 1500 TABLET, EXTENDED RELEASE ORAL at 10:08

## 2025-05-15 RX ADMIN — IPRATROPIUM BROMIDE 0.5 MG: 0.5 SOLUTION RESPIRATORY (INHALATION) at 08:16

## 2025-05-15 RX ADMIN — BACLOFEN 5 MG: 10 TABLET ORAL at 15:34

## 2025-05-15 RX ADMIN — LEVALBUTEROL HYDROCHLORIDE 1.25 MG: 1.25 SOLUTION RESPIRATORY (INHALATION) at 20:14

## 2025-05-15 RX ADMIN — CLOPIDOGREL BISULFATE 75 MG: 75 TABLET, FILM COATED ORAL at 07:35

## 2025-05-15 RX ADMIN — ATORVASTATIN CALCIUM 80 MG: 80 TABLET, FILM COATED ORAL at 15:34

## 2025-05-15 RX ADMIN — PANTOPRAZOLE SODIUM 40 MG: 40 TABLET, DELAYED RELEASE ORAL at 07:35

## 2025-05-15 NOTE — CASE MANAGEMENT
Case Management Discharge Planning Note    Patient name Mathew Rico  [facility] MRN [id number]  : 1950 Date 5/15/2025       Current Admission Date: 2025  Current Admission Diagnosis:Sepsis ([facility])   Patient Active Problem List    Diagnosis Date Noted    ARMANDO (acute kidney injury) ([facility]) 2025    COPD (chronic obstructive pulmonary disease) ([facility]) 2025    Pneumonia 2025    Sepsis ([facility]) 2025    Mild protein-calorie malnutrition ([facility]) 2025    Urinary obstruction 2025    History of stroke 2024    Bladder wall thickening 2024    Pain of left hip 2024    Left leg pain 2024    Dizziness 2024    Pruritus 2024    Asymptomatic bacteriuria 2024    Blurred vision, bilateral 2024    Symptoms of upper respiratory infection (URI) 06/15/2024    Chest pain 2024    Acute encephalopathy 2024    GERSON on CPAP 2024    Fall 2024    Primary hypertension 2024    Type 2 diabetes mellitus without complication, without long-term current use of insulin ([facility]) 2024    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[facility]) 2024    Multiple sclerosis ([facility]) 2024    Urinary retention 2024    Neck pain 2024    Vitamin B deficiency 2024    Generalized weakness 2024    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    Fall 2023    Weakness 2023    Accidental fall from chair 2023    Constipation 2023    Chronic diastolic congestive heart failure ([facility]) 2023    Hyponatremia 2023    Excessive daytime sleepiness 2022    Shortness of breath 2022    Pancreatic mass 2020    Pancreatic lesion 2020    Bladder stones 2019    Bladder neck contracture 2019    History of CVA (cerebrovascular accident) 10/21/2018    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[facility]) 2017    Diabetic neuropathy ([facility]) 2016    Neurogenic bladder 2016    Thyroid nodule  "09/02/2015    Cervical spinal stenosis 12/11/2014    Generalized anxiety disorder 07/13/2014    Obstructive sleep apnea 01/04/2013    Benign colon polyp 06/19/2012    Esophageal reflux 06/19/2012    Fatty liver 06/19/2012    Glaucoma 06/19/2012    Hyperlipidemia 06/19/2012    Multiple sclerosis (HCC) 06/19/2012    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  [facility] [address]  [facility] [address]  Phone: [phone] Fax: [phone]    [facility] [place], [place] - [address]  [address]  [address] [facility] [address]  Phone: [phone] Fax: [phone]     [facility] [place] - [address] [place] [address]  Phone: [phone] Fax: [phone]    Primary Care Provider: Carolina Kumari MD    Primary Insurance: [facility]  Secondary Insurance:     DISCHARGE DETAILS:           Patient requesting to speak with this   Reports \" I refuse to go back to that place, I will kill myself if I have to go back, I mean it\"  Discussed that this is where he lives now, and that I do not have power over this, but I could contact the facility to work with him when he goes back to facilitate a new placement.  Patient again stating he will do whatever he can to make sure he does not go back, reports he wants to go back to his house with his brother/ sister as this is where he lives    Message sent to [facility] re: alternate point of contact for pt family, as patients " brother is confused at baseline, and sister does not speak.

## 2025-05-15 NOTE — ASSESSMENT & PLAN NOTE
Lab Results   Component Value Date    WBC 12.31 (H) 05/15/2025    WBC 11.30 (H) 05/14/2025    WBC 15.57 (H) 05/13/2025    PROCALCITONI 0.10 05/15/2025    PROCALCITONI 0.15 05/12/2025    PROCALCITONI <0.05 02/10/2025    LACTICACID 2.1 (H) 05/12/2025    LACTICACID 3.8 (H) 05/12/2025    LACTICACID 2.1 (H) 02/11/2025   74-year-old male with past medical history of hypertension, hyperlipidemia and diabetes, COPD, CVA, CHF, MS with neurogenic bladder and chronic Cage presenting with change in mental status And cough.  Upon admission labs were significant for elevated white count 18 p.o.,tachycardia, tachypnea, elevated lactic acid 3.8.  CT chest abdomen pelvis with contrast 05/12/2025: Possible aspiration pneumonia, Marked bladder wall thickening possible cystitis  Patient has had multiple admissions for UTI 2/2 multi-drug resisatent organism. Was treated with Ertapenem during last admission at [facility] on 04/03, prior to that it was on 02/10.   Procalcitonin: Normal  S/p 2 L of IV fluid bolus and maintenance fluid  As per speech eval: Mild to moderate dysphagia. Puree and thick nectar ordered.     Pending blood cultures:negative  Uculture positive for staph could be a contaminate  Video barium swallow: no dysphagia   Most likely aspiration pneumonia versus UTI(given extensive history of prior UTIs and likely drug-resistant organism)  Started on Zosyn 4.5 g every 8 hours for 5 days  Urology was consulted for catheter exchange: No catheter exchange during this time.    Plan  Trend wbc and fever curve  Transitioned to CTX 1 gm daily D4/5.  Appreciate Speech therapy recommendations

## 2025-05-15 NOTE — PLAN OF CARE
Problem: Prexisting or High Potential for Compromised Skin Integrity  Goal: Skin integrity is maintained or improved  Description: INTERVENTIONS:- Identify patients at risk for skin breakdown- Assess and monitor skin integrity- Assess and monitor nutrition and hydration status- Monitor labs - Assess for incontinence - Turn and reposition patient- Assist with mobility/ambulation- Relieve pressure over bony prominences- Avoid friction and shearing- Provide appropriate hygiene as needed including keeping skin clean and dry- Evaluate need for skin moisturizer/barrier cream- Collaborate with interdisciplinary team - Patient/family teaching- Consider wound care consult   Outcome: Progressing     Problem: Nutrition/Hydration-ADULT  Goal: Nutrient/Hydration intake appropriate for improving, restoring or maintaining nutritional needs  Description: Monitor and assess patient's nutrition/hydration status for malnutrition. Collaborate with interdisciplinary team and initiate plan and interventions as ordered.  Monitor patient's weight and dietary intake as ordered or per policy. Utilize nutrition screening tool and intervene as necessary. Determine patient's food preferences and provide high-protein, high-caloric foods as appropriate.     INTERVENTIONS:  - Monitor oral intake, urinary output, labs, and treatment plans  - Assess nutrition and hydration status and recommend course of action  - Evaluate amount of meals eaten  - Assist patient with eating if necessary   - Allow adequate time for meals  - Recommend/ encourage appropriate diets, oral nutritional supplements, and vitamin/mineral supplements  - Order, calculate, and assess calorie counts as needed  - Recommend, monitor, and adjust tube feedings and TPN/PPN based on assessed needs  - Assess need for intravenous fluids  - Provide specific nutrition/hydration education as appropriate  - Include patient/family/caregiver in decisions related to nutrition  Outcome:  Progressing

## 2025-05-15 NOTE — PLAN OF CARE
Problem: Potential for Falls  Goal: Patient will remain free of falls  Description: INTERVENTIONS:- Educate patient/family on patient safety including physical limitations- Instruct patient to call for assistance with activity - Consult OT/PT to assist with strengthening/mobility - Keep Call bell within reach- Keep bed low and locked with side rails adjusted as appropriate- Keep care items and personal belongings within reach  Outcome: Progressing     Problem: Nutrition/Hydration-ADULT  Goal: Nutrient/Hydration intake appropriate for improving, restoring or maintaining nutritional needs  Description: Monitor and assess patient's nutrition/hydration status for malnutrition. Collaborate with interdisciplinary team and initiate plan and interventions as ordered.  Monitor patient's weight and dietary intake as ordered or per policy. Utilize nutrition screening tool and intervene as necessary. Determine patient's food preferences and provide high-protein, high-caloric foods as appropriate.     INTERVENTIONS:  - Monitor oral intake, urinary output, labs, and treatment plans  - Assess nutrition and hydration status and recommend course of action  - Evaluate amount of meals eaten  - Assist patient with eating if necessary   - Allow adequate time for meals  - Recommend/ encourage appropriate diets, oral nutritional supplements, and vitamin/mineral supplements  - Order, calculate, and assess calorie counts as needed  - Recommend, monitor, and adjust tube feedings and TPN/PPN based on assessed needs  - Assess need for intravenous fluids  - Provide specific nutrition/hydration education as appropriate  - Include patient/family/caregiver in decisions related to nutrition  Outcome: Progressing

## 2025-05-15 NOTE — PROGRESS NOTES
Progress Note - Hospitalist   Name: Mathew Rico 74 y.o. male I MRN: [id number]  Unit/Bed#: [facility] 820-01 I Date of Admission: 5/12/2025   Date of Service: 5/15/2025 I Hospital Day: 3     Assessment & Plan  Sepsis ([facility])  Lab Results   Component Value Date    WBC 12.31 (H) 05/15/2025    WBC 11.30 (H) 05/14/2025    WBC 15.57 (H) 05/13/2025    PROCALCITONI 0.10 05/15/2025    PROCALCITONI 0.15 05/12/2025    PROCALCITONI <0.05 02/10/2025    LACTICACID 2.1 (H) 05/12/2025    LACTICACID 3.8 (H) 05/12/2025    LACTICACID 2.1 (H) 02/11/2025   74-year-old male with past medical history of hypertension, hyperlipidemia and diabetes, COPD, CVA, CHF, MS with neurogenic bladder and chronic Cage presenting with change in mental status And cough.  Upon admission labs were significant for elevated white count 18 p.o.,tachycardia, tachypnea, elevated lactic acid 3.8.  CT chest abdomen pelvis with contrast 05/12/2025: Possible aspiration pneumonia, Marked bladder wall thickening possible cystitis  Patient has had multiple admissions for UTI 2/2 multi-drug resisatent organism. Was treated with Ertapenem during last admission at [facility] on 04/03, prior to that it was on 02/10.   Procalcitonin: Normal  S/p 2 L of IV fluid bolus and maintenance fluid  As per speech eval: Mild to moderate dysphagia. Puree and thick nectar ordered.     Pending blood cultures:negative  Uculture positive for staph could be a contaminate  Video barium swallow: no dysphagia   Most likely aspiration pneumonia versus UTI(given extensive history of prior UTIs and likely drug-resistant organism)  Started on Zosyn 4.5 g every 8 hours for 5 days  Urology was consulted for catheter exchange: No catheter exchange during this time.    Plan  Trend wbc and fever curve  Transitioned to CTX 1 gm daily D4/5.  Appreciate Speech therapy recommendations        Type 2 diabetes mellitus without complication, without long-term current use of insulin ([facility])  Lab Results   Component  Value Date    HGBA1C 6.3 (H) 02/10/2025       Recent Labs     05/13/25  0751   POCGLU 97       Blood Sugar Average: Last 72 hrs:  (P) 97Home medications: Empagliflozin, Januvia, Flonase 1000 mg daily,  Sliding scale  Diabetic diet  Goal blood glucose 140-180    Primary hypertension  Resatrted Amlodipine 10 mg daily  Lasix 40 mg once daily  Losartan 50 mg once daily  Holding blood pressure meds in the setting of sepsis. Restart as tolerated    GERSON on CPAP  CPAP at night  Will get VBG; concern for hypercapnia  Might benefit from Biapa  Hyperlipidemia  Patient takes atorvastatin 80 mg daily at bedtime  Chronic diastolic congestive heart failure (HCC)  Wt Readings from Last 3 Encounters:   05/12/25 65 kg (143 lb 6.4 oz)   02/11/25 64.1 kg (141 lb 5 oz)   01/20/25 64.1 kg (141 lb 5 oz)   Hold Lasix in the setting of sepsis            ARMANDO (acute kidney injury) ([facility])  Resolved  COPD (chronic obstructive pulmonary disease) ([facility])  Home meds: Symbicort twice daily  As per pulmonology: Started on Xopenox and atrovent.  Stopped Symbicort as it could complicate pneumonia  Pneumonia      VTE Pharmacologic Prophylaxis: VTE Score: 6 High Risk (Score >/= 5) - Pharmacological DVT Prophylaxis Ordered: enoxaparin (Lovenox). Sequential Compression Devices Ordered.    Mobility:   Basic Mobility Inpatient Raw Score: 7  JH-HLM Goal: 2: Bed activities/Dependent transfer  JH-HLM Achieved: 2: Bed activities/Dependent transfer  JH-HLM Goal NOT achieved. Continue with multidisciplinary rounding and encourage appropriate mobility to improve upon JH-HLM goals.    Patient Centered Rounds: I performed bedside rounds with nursing staff today.   Discussions with Specialists or Other Care Team Provider: Pulmonology    Education and Discussions with Family / Patient: Attempted to update  (brother) via phone. Unable to contact.    Current Length of Stay: 3 day(s)  Current Patient Status: Inpatient   Certification Statement: The patient  will continue to require additional inpatient hospital stay due to Pneumonia management  Discharge Plan: Anticipate discharge in 48-72 hrs to discharge location to be determined pending rehab evaluations.    Code Status: Level 1 - Full Code    Subjective   Patient seen at bedside. Patient is much more awake and calm. He could tell me his name, reason for hospitalization which seems like his baseline.     Objective :  Temp:  [97.8 °F (36.6 °C)-98.1 °F (36.7 °C)] 97.9 °F (36.6 °C)  HR:  [74-92] 74  BP: (134-157)/(56-64) 152/64  Resp:  [16-20] 16  SpO2:  [94 %-97 %] 96 %  O2 Device: None (Room air)    Body mass index is 24.61 kg/m².     Input and Output Summary (last 24 hours):     Intake/Output Summary (Last 24 hours) at 5/15/2025 1128  Last data filed at 5/15/2025 0728  Gross per 24 hour   Intake 560 ml   Output 850 ml   Net -290 ml       Physical Exam  HENT:      Head: Normocephalic and atraumatic.      Mouth/Throat:      Mouth: Mucous membranes are moist.     Cardiovascular:      Rate and Rhythm: Normal rate and regular rhythm.      Pulses: Normal pulses.      Heart sounds: Normal heart sounds.   Pulmonary:      Effort: Pulmonary effort is normal.      Breath sounds: Normal breath sounds.   Abdominal:      Palpations: Abdomen is soft.     Musculoskeletal:      Right lower leg: No edema.      Left lower leg: No edema.     Skin:     General: Skin is warm.     Neurological:      Mental Status: He is alert.      Comments: Oriented x2         Lines/Drains:  Lines/Drains/Airways       Active Status       Name Placement date Placement time Site Days    Suprapubic Catheter 20 Fr. 04/29/25  1100  --  16                            Lab Results: I have reviewed the following results:   Results from last 7 days   Lab Units 05/15/25  0547   WBC Thousand/uL 12.31*   HEMOGLOBIN g/dL 10.7*   HEMATOCRIT % 32.6*   PLATELETS Thousands/uL 394*   SEGS PCT % 60   LYMPHO PCT % 25   MONO PCT % 7   EOS PCT % 6     Results from last 7 days    Lab Units 05/15/25  0547 05/14/25  0606 05/13/25  0543   SODIUM mmol/L 140   < > 138   POTASSIUM mmol/L 3.2*   < > 3.7   CHLORIDE mmol/L 109*   < > 107   CO2 mmol/L 25   < > 23   BUN mg/dL 9   < > 21   CREATININE mg/dL 0.57*   < > 0.92   ANION GAP mmol/L 6   < > 8   CALCIUM mg/dL 8.6   < > 8.4   ALBUMIN g/dL  --   --  2.8*   TOTAL BILIRUBIN mg/dL  --   --  0.38   ALK PHOS U/L  --   --  57   ALT U/L  --   --  6*   AST U/L  --   --  13   GLUCOSE RANDOM mg/dL 96   < > 96    < > = values in this interval not displayed.     Results from last 7 days   Lab Units 05/12/25  1622   INR  1.02     Results from last 7 days   Lab Units 05/13/25  0751   POC GLUCOSE mg/dl 97         Results from last 7 days   Lab Units 05/15/25  0547 05/12/25  1755 05/12/25  1533   LACTIC ACID mmol/L  --  2.1* 3.8*   PROCALCITONIN ng/ml 0.10  --  0.15       Recent Cultures (last 7 days):   Results from last 7 days   Lab Units 05/13/25  1656 05/12/25  1538 05/12/25  1533   BLOOD CULTURE   --  No Growth at 48 hrs. No Growth at 48 hrs.   URINE CULTURE   --  >100,000 cfu/ml Staphylococcus aureus*  --    LEGIONELLA URINARY ANTIGEN  Negative  --   --              Last 24 Hours Medication List:     Current Facility-Administered Medications:     acetaminophen (TYLENOL) tablet 650 mg, Q6H PRN    albuterol (PROVENTIL HFA,VENTOLIN HFA) inhaler 2 puff, Q6H PRN    [Held by provider] amLODIPine (NORVASC) tablet 10 mg, Daily    atorvastatin (LIPITOR) tablet 80 mg, Daily With Dinner    baclofen tablet 5 mg, TID    cefTRIAXone (ROCEPHIN) 1,000 mg in dextrose 5 % 50 mL IVPB, Q24H, Last Rate: 1,000 mg (05/14/25 1642)    clopidogrel (PLAVIX) tablet 75 mg, Daily    enoxaparin (LOVENOX) subcutaneous injection 40 mg, Q24H CIERA    [Held by provider] furosemide (LASIX) tablet 40 mg, Daily    gabapentin (NEURONTIN) capsule 300 mg, TID    ipratropium (ATROVENT) 0.02 % inhalation solution 0.5 mg, TID    levalbuterol (XOPENEX) inhalation solution 1.25 mg, TID    [Held by  provider] losartan (COZAAR) tablet 50 mg, Daily    mirtazapine (REMERON) tablet 7.5 mg, HS    pantoprazole (PROTONIX) EC tablet 40 mg, Daily    Administrative Statements   Today, Patient Was Seen By: Nadia Galvan MD      **Please Note: This note may have been constructed using a voice recognition system.**

## 2025-05-15 NOTE — ASSESSMENT & PLAN NOTE
Resatrted Amlodipine 10 mg daily  Lasix 40 mg once daily  Losartan 50 mg once daily  Holding blood pressure meds in the setting of sepsis. Restart as tolerated

## 2025-05-16 PROBLEM — N39.0 URINARY TRACT INFECTION ASSOCIATED WITH CYSTOSTOMY CATHETER  (HCC): Status: ACTIVE | Noted: 2025-05-16

## 2025-05-16 PROBLEM — T83.510A URINARY TRACT INFECTION ASSOCIATED WITH CYSTOSTOMY CATHETER  (HCC): Status: ACTIVE | Noted: 2025-05-16

## 2025-05-16 LAB
ANION GAP SERPL CALCULATED.3IONS-SCNC: 7 MMOL/L (ref 4–13)
BUN SERPL-MCNC: 9 MG/DL (ref 5–25)
CALCIUM SERPL-MCNC: 8.6 MG/DL (ref 8.4–10.2)
CHLORIDE SERPL-SCNC: 107 MMOL/L (ref 96–108)
CO2 SERPL-SCNC: 24 MMOL/L (ref 21–32)
CREAT SERPL-MCNC: 0.69 MG/DL (ref 0.6–1.3)
ERYTHROCYTE [DISTWIDTH] IN BLOOD BY AUTOMATED COUNT: 16.3 % (ref 11.6–15.1)
GFR SERPL CREATININE-BSD FRML MDRD: 92 ML/MIN/1.73SQ M
GLUCOSE SERPL-MCNC: 113 MG/DL (ref 65–140)
HCT VFR BLD AUTO: 32.1 % (ref 36.5–49.3)
HGB BLD-MCNC: 10.3 G/DL (ref 12–17)
MCH RBC QN AUTO: 26.7 PG (ref 26.8–34.3)
MCHC RBC AUTO-ENTMCNC: 32.1 G/DL (ref 31.4–37.4)
MCV RBC AUTO: 83 FL (ref 82–98)
PLATELET # BLD AUTO: 412 THOUSANDS/UL (ref 149–390)
PMV BLD AUTO: 7.9 FL (ref 8.9–12.7)
POTASSIUM SERPL-SCNC: 3.8 MMOL/L (ref 3.5–5.3)
PROCALCITONIN SERPL-MCNC: 0.09 NG/ML
RBC # BLD AUTO: 3.86 MILLION/UL (ref 3.88–5.62)
SODIUM SERPL-SCNC: 138 MMOL/L (ref 135–147)
WBC # BLD AUTO: 10.84 THOUSAND/UL (ref 4.31–10.16)

## 2025-05-16 PROCEDURE — 92526 ORAL FUNCTION THERAPY: CPT

## 2025-05-16 PROCEDURE — 97535 SELF CARE MNGMENT TRAINING: CPT

## 2025-05-16 PROCEDURE — 97110 THERAPEUTIC EXERCISES: CPT

## 2025-05-16 PROCEDURE — 84145 PROCALCITONIN (PCT): CPT | Performed by: STUDENT IN AN ORGANIZED HEALTH CARE EDUCATION/TRAINING PROGRAM

## 2025-05-16 PROCEDURE — 85027 COMPLETE CBC AUTOMATED: CPT

## 2025-05-16 PROCEDURE — 80048 BASIC METABOLIC PNL TOTAL CA: CPT

## 2025-05-16 PROCEDURE — 97530 THERAPEUTIC ACTIVITIES: CPT

## 2025-05-16 PROCEDURE — 99232 SBSQ HOSP IP/OBS MODERATE 35: CPT | Performed by: STUDENT IN AN ORGANIZED HEALTH CARE EDUCATION/TRAINING PROGRAM

## 2025-05-16 PROCEDURE — 94640 AIRWAY INHALATION TREATMENT: CPT

## 2025-05-16 PROCEDURE — 94664 DEMO&/EVAL PT USE INHALER: CPT

## 2025-05-16 PROCEDURE — 94760 N-INVAS EAR/PLS OXIMETRY 1: CPT

## 2025-05-16 RX ORDER — FUROSEMIDE 40 MG/1
40 TABLET ORAL DAILY
Status: DISCONTINUED | OUTPATIENT
Start: 2025-05-16 | End: 2025-05-21 | Stop reason: HOSPADM

## 2025-05-16 RX ADMIN — AMLODIPINE BESYLATE 10 MG: 10 TABLET ORAL at 09:05

## 2025-05-16 RX ADMIN — GABAPENTIN 300 MG: 300 CAPSULE ORAL at 21:23

## 2025-05-16 RX ADMIN — ATORVASTATIN CALCIUM 80 MG: 80 TABLET, FILM COATED ORAL at 16:44

## 2025-05-16 RX ADMIN — IPRATROPIUM BROMIDE 0.5 MG: 0.5 SOLUTION RESPIRATORY (INHALATION) at 20:06

## 2025-05-16 RX ADMIN — BACLOFEN 5 MG: 10 TABLET ORAL at 09:05

## 2025-05-16 RX ADMIN — CEFTRIAXONE SODIUM 1000 MG: 10 INJECTION, POWDER, FOR SOLUTION INTRAVENOUS at 10:47

## 2025-05-16 RX ADMIN — BACLOFEN 5 MG: 10 TABLET ORAL at 16:45

## 2025-05-16 RX ADMIN — IPRATROPIUM BROMIDE 0.5 MG: 0.5 SOLUTION RESPIRATORY (INHALATION) at 14:10

## 2025-05-16 RX ADMIN — LEVALBUTEROL HYDROCHLORIDE 1.25 MG: 1.25 SOLUTION RESPIRATORY (INHALATION) at 14:10

## 2025-05-16 RX ADMIN — GABAPENTIN 300 MG: 300 CAPSULE ORAL at 16:44

## 2025-05-16 RX ADMIN — PANTOPRAZOLE SODIUM 40 MG: 40 TABLET, DELAYED RELEASE ORAL at 09:05

## 2025-05-16 RX ADMIN — LEVALBUTEROL HYDROCHLORIDE 1.25 MG: 1.25 SOLUTION RESPIRATORY (INHALATION) at 20:06

## 2025-05-16 RX ADMIN — CLOPIDOGREL BISULFATE 75 MG: 75 TABLET, FILM COATED ORAL at 09:05

## 2025-05-16 RX ADMIN — GABAPENTIN 300 MG: 300 CAPSULE ORAL at 09:05

## 2025-05-16 RX ADMIN — FUROSEMIDE 40 MG: 40 TABLET ORAL at 12:04

## 2025-05-16 RX ADMIN — BACLOFEN 5 MG: 10 TABLET ORAL at 21:23

## 2025-05-16 RX ADMIN — ACETAMINOPHEN 650 MG: 325 TABLET ORAL at 16:45

## 2025-05-16 RX ADMIN — MIRTAZAPINE 7.5 MG: 15 TABLET, FILM COATED ORAL at 21:23

## 2025-05-16 RX ADMIN — ENOXAPARIN SODIUM 40 MG: 40 INJECTION SUBCUTANEOUS at 09:05

## 2025-05-16 RX ADMIN — ACETAMINOPHEN 650 MG: 325 TABLET ORAL at 09:04

## 2025-05-16 NOTE — OCCUPATIONAL THERAPY NOTE
Occupational Therapy Progress Note     Patient Name: Mathew Rico  Today's Date: 5/16/2025  Problem List  Principal Problem:    Sepsis (HCC)  Active Problems:    Hyperlipidemia    Primary hypertension    Multiple sclerosis (HCC)    Chronic diastolic congestive heart failure (HCC)    GERSON on CPAP    Type 2 diabetes mellitus without complication, without long-term current use of insulin (HCC)    ARMANDO (acute kidney injury) (HCC)    COPD (chronic obstructive pulmonary disease) (HCC)    Pneumonia    Urinary tract infection associated with cystostomy catheter  (HCC)          05/16/25 1159   OT Last Visit   OT Visit Date 05/16/25   Note Type   Note Type Treatment   Pain Assessment   Pain Assessment Tool FLACC   Pain Location/Orientation Orientation: Bilateral;Location: Leg   Pain Rating: FLACC (Rest) - Face 1   Pain Rating: FLACC (Rest) - Legs 1   Pain Rating: FLACC (Rest) - Activity 0   Pain Rating: FLACC (Rest) - Cry 1   Pain Rating: FLACC (Rest) - Consolability 0   Score: FLACC (Rest) 3   Pain Rating: FLACC (Activity) - Face 1   Pain Rating: FLACC (Activity) - Legs 0   Pain Rating: FLACC (Activity) - Activity 0   Pain Rating: FLACC (Activity) - Cry 1   Pain Rating: FLACC (Activity) - Consolability 0   Score: FLACC (Activity) 2   Restrictions/Precautions   Weight Bearing Precautions Per Order No   Other Precautions Contact/isolation;Cognitive;Chair Alarm;Bed Alarm;Multiple lines;Fall Risk;Pain   Lifestyle   Autonomy Assist ADLS/IADLS, transfers and functional mobility PTA   Reciprocal Relationships Pt lives w/ facility staff   Service to Others Retired   Intrinsic Gratification Likes rock music   ADL   Where Assessed Supine, bed   Grooming Assistance 5  Supervision/Setup   Grooming Deficit Wash/dry face   UB Dressing Assistance 3  Moderate Assistance   UB Dressing Deficit Thread RUE;Thread LUE   LB Dressing Assistance 1  Total Assistance   LB Dressing Deficit Don/doff R sock;Don/doff L sock   Bed Mobility   Rolling R  "3  Moderate assistance   Additional items Assist x 1;Bedrails   Rolling L 3  Moderate assistance   Additional items Assist x 1;Bedrails   Supine to Sit 2  Maximal assistance   Additional items Assist x 1;Increased time required;LE management  (initially MAX A x 1, transitioned to MOD A x 2)   Sit to Supine 2  Maximal assistance   Additional items Assist x 1;HOB elevated;Bedrails;Increased time required;LE management   Additional Comments Initial STS, pt sliding forward at EOB, requiring knee block. Upon 2nd attempt, pt posture improved with posterior and anterior support and encouragement of trunk flexion   Transfers   Sit to Stand Unable to assess   Stand to Sit Unable to assess   Subjective   Subjective \"I was a \"   Cognition   Overall Cognitive Status Impaired   Arousal/Participation Responsive;Cooperative   Attention Attends with cues to redirect   Orientation Level Oriented to person;Disoriented to place;Disoriented to time  (reports place as sarcred heart)   Memory Decreased recall of precautions   Following Commands Follows one step commands with increased time or repetition   Comments Pt primarily pleasant and cooperative t/o; does verbalize complaints/frustrations regarding facility he resides in and family. Informed CM. Hard of hearing   Activity Tolerance   Activity Tolerance Patient limited by pain;Patient limited by fatigue   Medical Staff Made Aware RN clearance for session, [facility] [place]   Assessment   Assessment Patient participated in Skilled OT session this date with interventions consisting of ADL re training with the use of correct body mechnaics, Energy Conservation techniques, safety awareness and fall prevention techniques,  therapeutic activities to: increase activity tolerance, increase dynamic sit/ stand balance during functional activity , increase postural control, increase trunk control, and increase OOB/ sitting tolerance .Upon arrival patient was found supine in bed. Pt " demonstrated the following tasks: MOD A to roll, MAX A x 1 vs MOD A x 2 for sup <> sit transfers. Pt performed grooming with S, MOD A for UBD, and total A for LBD. Patient continues to be functioning below baseline level, occupational performance remains limited secondary to factors listed above and increased risk for falls and injury.   From OT standpoint, recommendation at time of d/c would be return to facility with rehab services.  Patient to benefit from continued Occupational Therapy treatment while in the hospital to address deficits as defined above and maximize level of functional independence with ADLs and functional mobility. Pt was left after session with all current needs met.   The patient's raw score on the -PAC Daily Activity Inpatient Short Form is 13. A raw score of less than 19 suggests the patient may benefit from discharge to post-acute rehabilitation services. Please refer to the recommendation of the Occupational Therapist for safe discharge planning.   Plan   Treatment Interventions ADL retraining;Functional transfer training;Endurance training;Patient/family training;Equipment evaluation/education;Compensatory technique education;Continued evaluation;Activityengagement;Energy conservation   Goal Expiration Date 05/28/25   OT Treatment Day 1   OT Frequency 2-3x/wk   Discharge Recommendation   Rehab Resource Intensity Level, OT III (Minimum Resource Intensity)  (return to facility with rehab services)   AM-PAC Daily Activity Inpatient   Lower Body Dressing 1   Bathing 2   Toileting 2   Upper Body Dressing 2   Grooming 3   Eating 3   Daily Activity Raw Score 13   Daily Activity Standardized Score (Calc for Raw Score >=11) 32.03   AM-PAC Applied Cognition Inpatient   Following a Speech/Presentation 3   Understanding Ordinary Conversation 4   Taking Medications 3   Remembering Where Things Are Placed or Put Away 3   Remembering List of 4-5 Errands 2   Taking Care of Complicated Tasks 2    Applied Cognition Raw Score 17   Applied Cognition Standardized Score 36.52     Katerine Schrader MS, OTR/L

## 2025-05-16 NOTE — ASSESSMENT & PLAN NOTE
Baseline creatinine 0.6-0.9.  POA creatinine 1.48  Creatinine now at baseline, resolved  Likely prerenal etiology in setting of sepsis with use of Lasix and losartan  Continue holding losartan, consider resuming tomorrow

## 2025-05-16 NOTE — PHYSICAL THERAPY NOTE
PHYSICAL THERAPY NOTE          Patient Name: Mathew Rico  Today's Date: 5/16/2025 05/16/25 1244   PT Last Visit   PT Visit Date 05/16/25   Note Type   Note Type Treatment   Pain Assessment   Pain Assessment Tool FLACC   Pain Location/Orientation Orientation: Bilateral;Location: Leg   Pain Rating: FLACC (Rest) - Face 0   Pain Rating: FLACC (Rest) - Legs 0   Pain Rating: FLACC (Rest) - Activity 0   Pain Rating: FLACC (Rest) - Cry 0   Pain Rating: FLACC (Rest) - Consolability 0   Score: FLACC (Rest) 0   Pain Rating: FLACC (Activity) - Face 1   Pain Rating: FLACC (Activity) - Legs 0   Pain Rating: FLACC (Activity) - Activity 1   Pain Rating: FLACC (Activity) - Cry 1   Pain Rating: FLACC (Activity) - Consolability 1   Score: FLACC (Activity) 4   Restrictions/Precautions   Weight Bearing Precautions Per Order No   Other Precautions Contact/isolation;Chair Alarm;Bed Alarm;Cognitive;Multiple lines;Fall Risk;Pain   General   Chart Reviewed Yes   Family/Caregiver Present No   Cognition   Overall Cognitive Status Impaired   Arousal/Participation Responsive   Attention Attends with cues to redirect   Orientation Level Oriented to person;Disoriented to place;Disoriented to time  (Pt states he is at [facility])   Following Commands Follows one step commands with increased time or repetition   Comments Pt overall cooperative, [place]   Bed Mobility   Rolling R 3  Moderate assistance   Additional items Assist x 1;Bedrails;Increased time required;Verbal cues;LE management   Rolling L 3  Moderate assistance   Additional items Assist x 1;Increased time required;Bedrails;Verbal cues;LE management   Supine to Sit 2  Maximal assistance   Additional items Assist x 1;HOB elevated;Bedrails;Increased time required;Verbal cues;LE management   Sit to Supine 2  Maximal assistance   Additional items Assist x 1;Verbal cues;LE management;Increased  time required   Additional Comments Pt in bed upon arrival. Gets to EOB with max assist X 1, EOB sitting with close SUP -min asist   Transfers   Sit to Stand Unable to assess   Stand to Sit Unable to assess   Additional Comments unable to complete STS, difficulty with foot placement. Lateral scoot with max assist to reposition in bed   Ambulation/Elevation   Gait pattern Not appropriate   Balance   Static Sitting Fair -   Dynamic Sitting Poor +   Endurance Deficit   Endurance Deficit Yes   Activity Tolerance   Activity Tolerance Patient limited by fatigue;Patient limited by pain   Nurse Made Aware RN cleared pt for therapy   Exercises   Heelslides Supine;15 reps;Bilateral;AAROM   Hip Flexion Supine;10 reps;PROM;Bilateral   Hip Abduction Sitting;10 reps;AAROM;Bilateral;PROM   Heel Cord Stretch Supine;10 reps;Bilateral   Balance training  EOB sitting ~ 5-6 minutes with SUP - Min assist for dynamic balance- posterior lean with LE movement   Assessment   Prognosis Fair   Problem List Decreased mobility;Decreased endurance;Impaired balance;Decreased strength;Decreased range of motion;Impaired hearing;Decreased safety awareness;Impaired judgement;Decreased cognition;Pain   Assessment Pt seen for PT session today. Pt cooperative, does need frequent VCs for safety. Pt led through LE exercises and UE exercises when in bed , completes with AAROM/PROM with rest breaks. Pt able ot get to EOB with max assist , maintains EOB sitting with assist. Unable to complete STS , lateral scoot completed to reposition in bed.Post dc recommendation remains Level III, return to LTC facility with PT services.   Goals   Patient Goals to rest   STG Expiration Date 05/28/25   PT Treatment Day 1   Plan   Treatment/Interventions Bed mobility;Spoke to nursing;Spoke to case management;OT;Functional transfer training;Therapeutic exercise   Progress Slow progress, decreased activity tolerance   PT Frequency 1-2x/wk   Discharge Recommendation   Rehab  Resource Intensity Level, PT III (Minimum Resource Intensity)  (return to LTC facility with PT)   AM-PAC Basic Mobility Inpatient   Turning in Flat Bed Without Bedrails 2   Lying on Back to Sitting on Edge of Flat Bed Without Bedrails 2   Moving Bed to Chair 1   Standing Up From Chair Using Arms 1   Walk in Room 1   Climb 3-5 Stairs With Railing 1   Basic Mobility Inpatient Raw Score 8   Turning Head Towards Sound 4   Follow Simple Instructions 3   Low Function Basic Mobility Raw Score  15   Low Function Basic Mobility Standardized Score  23.9   [facility] Highest Level Of Mobility   -HLM Goal 3: Sit at edge of bed   -HL Achieved 3: Sit at edge of bed   Education   Education Provided Mobility training   Patient Reinforcement needed   End of Consult   Patient Position at End of Consult All needs within reach;Bed/Chair alarm activated;Supine   Ilana Curtis PT DPT

## 2025-05-16 NOTE — ASSESSMENT & PLAN NOTE
Home meds: Symbicort twice daily  As per pulmonology: Started on Xopenox and atrovent.  Stopped Symbicort as it could increase risk of pneumonia  Consider LAMA if needed daily inhaler

## 2025-05-16 NOTE — PLAN OF CARE
Problem: OCCUPATIONAL THERAPY ADULT  Goal: Performs self-care activities at highest level of function for planned discharge setting.  See evaluation for individualized goals.  Description: Treatment Interventions: ADL retraining, Functional transfer training, Visual perceptual retraining, UE strengthening/ROM, Endurance training, Cognitive reorientation, Patient/family training, Equipment evaluation/education, Compensatory technique education, Continued evaluation, Energy conservation, Activityengagement          See flowsheet documentation for full assessment, interventions and recommendations.   Outcome: Progressing  Note: Limitation: Decreased ADL status, Decreased UE strength, Decreased Safe judgement during ADL, Decreased cognition, Decreased endurance, Visual deficit, Decreased self-care trans, Decreased high-level ADLs  Prognosis: Fair  Assessment: Patient participated in Skilled OT session this date with interventions consisting of ADL re training with the use of correct body mechnaics, Energy Conservation techniques, safety awareness and fall prevention techniques,  therapeutic activities to: increase activity tolerance, increase dynamic sit/ stand balance during functional activity , increase postural control, increase trunk control, and increase OOB/ sitting tolerance .Upon arrival patient was found supine in bed. Pt demonstrated the following tasks: MOD A to roll, MAX A x 1 vs MOD A x 2 for sup <> sit transfers. Pt performed grooming with S, MOD A for UBD, and total A for LBD. Patient continues to be functioning below baseline level, occupational performance remains limited secondary to factors listed above and increased risk for falls and injury.   From OT standpoint, recommendation at time of d/c would be return to facility with rehab services.  Patient to benefit from continued Occupational Therapy treatment while in the hospital to address deficits as defined above and maximize level of functional  independence with ADLs and functional mobility. Pt was left after session with all current needs met.   The patient's raw score on the AM-PAC Daily Activity Inpatient Short Form is 13. A raw score of less than 19 suggests the patient may benefit from discharge to post-acute rehabilitation services. Please refer to the recommendation of the Occupational Therapist for safe discharge planning.     Rehab Resource Intensity Level, OT: III (Minimum Resource Intensity) (return to facility with rehab services)

## 2025-05-16 NOTE — SPEECH THERAPY NOTE
Speech-Language Pathology Progress Note      Patient Name: Mathew Rico    Today's Date: 5/16/2025      Subjective:  Pt was awake and alert. He was sitting upright in bed.     Objective:  Pt was seen today for dysphagia therapy. Current diet is puree with thin liquids. Pt was on room air. Oral care had already been completed. Focus of today's session was to maximize PO intake safety and determine potential for diet texture advancement. Textures offered today included puree and thin liquid.  Swallow function:   Bolus retrieval and control were adequate. Manipulation and AP transfer were slow. Pharyngeal swallow initiation was prompt. Hyolaryngeal excursion was adequate. No s/s aspiration occurred during session today.  Majority of trials refused.     Assessment:  Swallow function is stable with current diet. Pt is refusing most food d/t dislike of puree. Diet textures were discussed, pt agreeable to trial of [facility] soft.     Plan:  Change diet texture to dysphagia 2 mechanical soft. Continue ST follow up. Subsequent sessions to focus on maximizing PO intake safety, improving pt's self monitoring, and educating pt/family on safe PO intake strategies.

## 2025-05-16 NOTE — ASSESSMENT & PLAN NOTE
Wt Readings from Last 3 Encounters:   05/12/25 65 kg (143 lb 6.4 oz)   02/11/25 64.1 kg (141 lb 5 oz)   01/20/25 64.1 kg (141 lb 5 oz)     Euvolemic  Resume PTA Lasix 40 mg daily  Monitor volume status

## 2025-05-16 NOTE — ASSESSMENT & PLAN NOTE
"Lab Results   Component Value Date    HGBA1C 6.3 (H) 02/10/2025       No results for input(s): \"POCGLU\" in the last 72 hours.      Blood Sugar Average: Last 72 hrs:  (P) 97  Home medications: Empagliflozin, Januvia, Flonase 1000 mg daily,  Sliding scale  Diabetic diet  Goal blood glucose 140-180  "

## 2025-05-16 NOTE — ASSESSMENT & PLAN NOTE
LLL pneumonia, possible aspiration related  Continue 5-day course of IV antibiotics, see above  Improving

## 2025-05-16 NOTE — PLAN OF CARE
Problem: PHYSICAL THERAPY ADULT  Goal: Performs mobility at highest level of function for planned discharge setting.  See evaluation for individualized goals.  Description: Treatment/Interventions: Functional transfer training, LE strengthening/ROM, Therapeutic exercise, Endurance training, Cognitive reorientation, Patient/family training, Equipment eval/education, Bed mobility, Gait training, Spoke to nursing, Spoke to case management, OT  Equipment Recommended: Walker, Wheelchair (owns)       See flowsheet documentation for full assessment, interventions and recommendations.  Outcome: Not Progressing  Note: Prognosis: Fair  Problem List: Decreased mobility, Decreased endurance, Impaired balance, Decreased strength, Decreased range of motion, Impaired hearing, Decreased safety awareness, Impaired judgement, Decreased cognition, Pain  Assessment: Pt seen for PT session today. Pt cooperative, does need frequent VCs for safety. Pt led through LE exercises and UE exercises when in bed , completes with AAROM/PROM with rest breaks. Pt able ot get to EOB with max assist , maintains EOB sitting with assist. Unable to complete STS , lateral scoot completed to reposition in bed.Post dc recommendation remains Level III, return to LTC facility with PT services.        Rehab Resource Intensity Level, PT: III (Minimum Resource Intensity) (return to LTC facility with PT)    See flowsheet documentation for full assessment.

## 2025-05-16 NOTE — PLAN OF CARE
Problem: Potential for Falls  Goal: Patient will remain free of falls  Description: INTERVENTIONS:- Educate patient/family on patient safety including physical limitations- Instruct patient to call for assistance with activity - Consult OT/PT to assist with strengthening/mobility - Keep Call bell within reach- Keep bed low and locked with side rails adjusted as appropriate- Keep care items and personal belongings within reach- Initiate and maintain comfort rounds- Make Fall Risk Sign visible to staff- Offer Toileting every 2 Hours, in advance of need- Initiate/Maintain bed alarm- Obtain necessary fall risk management equipment: non-skid socks- Apply yellow socks and bracelet for high fall risk patients- Consider moving patient to room near nurses station  Outcome: Progressing     Problem: Prexisting or High Potential for Compromised Skin Integrity  Goal: Skin integrity is maintained or improved  Description: INTERVENTIONS:- Identify patients at risk for skin breakdown- Assess and monitor skin integrity- Assess and monitor nutrition and hydration status- Monitor labs - Assess for incontinence - Turn and reposition patient- Assist with mobility/ambulation- Relieve pressure over bony prominences- Avoid friction and shearing- Provide appropriate hygiene as needed including keeping skin clean and dry- Evaluate need for skin moisturizer/barrier cream- Collaborate with interdisciplinary team - Patient/family teaching- Consider wound care consult   Outcome: Progressing     Problem: Nutrition/Hydration-ADULT  Goal: Nutrient/Hydration intake appropriate for improving, restoring or maintaining nutritional needs  Description: Monitor and assess patient's nutrition/hydration status for malnutrition. Collaborate with interdisciplinary team and initiate plan and interventions as ordered.  Monitor patient's weight and dietary intake as ordered or per policy. Utilize nutrition screening tool and intervene as necessary. Determine  patient's food preferences and provide high-protein, high-caloric foods as appropriate.     INTERVENTIONS:  - Monitor oral intake, urinary output, labs, and treatment plans  - Assess nutrition and hydration status and recommend course of action  - Evaluate amount of meals eaten  - Assist patient with eating if necessary   - Allow adequate time for meals  - Recommend/ encourage appropriate diets, oral nutritional supplements, and vitamin/mineral supplements  - Order, calculate, and assess calorie counts as needed  - Recommend, monitor, and adjust tube feedings and TPN/PPN based on assessed needs  - Assess need for intravenous fluids  - Provide specific nutrition/hydration education as appropriate  - Include patient/family/caregiver in decisions related to nutrition  Outcome: Progressing

## 2025-05-16 NOTE — CASE MANAGEMENT
Case Management Discharge Planning Note    Patient name Mathew Rico  Location [facility] 820/[facility] 820-01 MRN [id number]  : 1950 Date 2025       Current Admission Date: 2025  Current Admission Diagnosis:Sepsis ([facility])   Patient Active Problem List    Diagnosis Date Noted    ARMANDO (acute kidney injury) ([facility]) 2025    COPD (chronic obstructive pulmonary disease) ([facility]) 2025    Pneumonia 2025    Sepsis ([facility]) 2025    Mild protein-calorie malnutrition ([facility]) 2025    Urinary obstruction 2025    History of stroke 2024    Bladder wall thickening 2024    Pain of left hip 2024    Left leg pain 2024    Dizziness 2024    Pruritus 2024    Asymptomatic bacteriuria 2024    Blurred vision, bilateral 2024    Symptoms of upper respiratory infection (URI) 06/15/2024    Chest pain 2024    Acute encephalopathy 2024    GERSON on CPAP 2024    Fall 2024    Primary hypertension 2024    Type 2 diabetes mellitus without complication, without long-term current use of insulin ([facility]) 2024    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    Multiple sclerosis ([facility]) 2024    Urinary retention 2024    Neck pain 2024    Vitamin B deficiency 2024    Generalized weakness 2024    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    Fall 2023    Weakness 2023    Accidental fall from chair 2023    Constipation 2023    Chronic diastolic congestive heart failure ([facility]) 2023    Hyponatremia 2023    Excessive daytime sleepiness 2022    Shortness of breath 2022    Pancreatic mass 2020    Pancreatic lesion 2020    Bladder stones 2019    Bladder neck contracture 2019    History of CVA (cerebrovascular accident) 10/21/2018    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    Diabetic neuropathy ([facility]) 2016    Neurogenic bladder 2016    Thyroid nodule  "09/02/2015    Cervical spinal stenosis 12/11/2014    Generalized anxiety disorder 07/13/2014    Obstructive sleep apnea 01/04/2013    Benign colon polyp 06/19/2012    Esophageal reflux 06/19/2012    Fatty liver 06/19/2012    Glaucoma 06/19/2012    Hyperlipidemia 06/19/2012    Multiple sclerosis (HCC) 06/19/2012    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) 06/19/2012    Primary hypertension 06/11/2012      LOS (days): 4  Geometric Mean LOS (GMLOS) (days): 4.9  Days to GMLOS:1.2     OBJECTIVE:  Risk of Unplanned Readmission Score: 45.54         Current admission status: Inpatient   Preferred Pharmacy:   [facility]/pharmacy #[id number] - [place], [place] - [address] [facility] [address]  [address] [address]  Phone: [phone] Fax: [phone]    [place], [place] - [address]  [facility]note 3[facility] [address]  Phone: [phone] Fax: [phone]    [facility] [place], [place] - [address]  [facility],  [address]  [facility],  First Floor [facility] [address]  Phone: [phone] Fax: [phone]     PHARMACY [place], [place] - [address] [place] [address]  Phone: [phone] Fax: [phone]    Primary Care Provider: Carolina Kumari MD    Primary Insurance: [employer] [facility]  Secondary Insurance:     DISCHARGE DETAILS:           Patient continues to share that he does NOT wish to return to his facility.   I attempted to contact his brother, Guzman via phone. Unable to get very far, Guzman appears very confused, hard of hearing, and was unable to directly answer any questions, ultimately told me he \" had to get going\" and asked me to call \" [facility]\" to help with anything further. He was unsure of pts admissions status and said \" good, what's [facility]\" when I told him where he was. He then told me he had to get going again, and call ended    Left two voicemails with [facility] re: same. They ultimately need to step up " and either 1) identify home care support for a safe d/c or 2) assist in a new placement as [facility] is the insurance provider and only contract with 3 facilities, two of which are [facility] or [facility]     Spoke to brother in law, Kristopher ( [phone]) re: same. He has been in communication with pt as well, and is aware of his requests, but stated   he was removed from the home due to inability to take care of self, and family unable to take care of him, either. Brother/ sister do not read or write and brother, as we know, are extremely hard of hearing.  Reports numerous admissions in 2024 to [facility] d/t cath dysfunction and UTIs. Ultimately, was transferred to [facility] and has remained there since February 2025.     Neuropsych consulted, await return calls from [facility]            1: 57pm: Left v/m for SL supervisor, Giovana España

## 2025-05-16 NOTE — ASSESSMENT & PLAN NOTE
74-year-old male with past medical history of hypertension, hyperlipidemia and diabetes, COPD, CVA, CHF, MS with neurogenic bladder and chronic Cage presenting with change in mental status And cough.    As evidenced by leukocytosis (WBC 18), tachycardia, tachypnea, elevated lactate 3.8   CT chest abdomen pelvis with contrast 05/12/2025: Possible aspiration pneumonia, Marked bladder wall thickening possible cystitis.  UCx with MSSA  5/12 BCx negative thus far  Legionella and urine strep antigen negative  Suspect related to aspiration pneumonia, possible cystitis  Received 3 days of IV Zosyn and completed 5-day course of antibiotic with ceftriaxone  Urology was consulted for catheter exchange: No catheter exchange during this time  Improving, stable for discharge pending neuropsych evaluation.  Mentation appears to improved from initial admission however currently patient reports that he wants to go home however patient has been residing at a group home for the last 2 months.  Patient refusing to go back to the group home.  CM attempted to discuss with brother who was unable to answer questions directly.  Currently unsafe discharge pending determination from neuropsych evaluation.

## 2025-05-16 NOTE — PROGRESS NOTES
"Progress Note - Hospitalist   Name: Mathew Rico 74 y.o. male I MRN: [id number]  [facility] I Date of Admission: 5/12/2025   Date of Service: 5/16/2025 I Hospital Day: 4    Assessment & Plan  Sepsis ([facility])  74-year-old male with past medical history of hypertension, hyperlipidemia and diabetes, COPD, CVA, CHF, MS with neurogenic bladder and chronic Cage presenting with change in mental status And cough.    As evidenced by leukocytosis (WBC 18), tachycardia, tachypnea, elevated lactate 3.8   CT chest abdomen pelvis with contrast 05/12/2025: Possible aspiration pneumonia, Marked bladder wall thickening possible cystitis.  UCx with MSSA  5/12 BCx negative thus far  Legionella and urine strep antigen negative  Suspect related to aspiration pneumonia, possible cystitis  Received 3 days of IV Zosyn and completed 5-day course of antibiotic with ceftriaxone  Urology was consulted for catheter exchange: No catheter exchange during this time  Improving, stable for discharge pending neuropsych evaluation.  Mentation appears to improved from initial admission however currently patient reports that he wants to go home however patient has been residing at a group home for the last 2 months.  Patient refusing to go back to the group home.  CM attempted to discuss with brother who was unable to answer questions directly.  Currently unsafe discharge pending determination from neuropsych evaluation.  Pneumonia  LLL pneumonia, possible aspiration related  Continue 5-day course of IV antibiotics, see above  Improving  Urinary tract infection associated with cystostomy catheter  ([facility])  UCx with MSSA  Continue IV antibiotics as above  Type 2 diabetes mellitus without complication, without long-term current use of insulin ([facility])  Lab Results   Component Value Date    HGBA1C 6.3 (H) 02/10/2025       No results for input(s): \"POCGLU\" in the last 72 hours.      Blood Sugar Average: Last 72 hrs:  (P) 97  Home medications: " Empagliflozin, Januvia, Flonase 1000 mg daily,  Sliding scale  Diabetic diet  Goal blood glucose 140-180  Primary hypertension  Blood pressure stable  Continue amlodipine 10 mg daily  Resume Lasix 40 mg once daily  Hold losartan 50 mg once daily, consider resuming tomorrow  GERSON on CPAP  CPAP at night  Hyperlipidemia  Continue atorvastatin 80 mg daily chest  Chronic diastolic congestive heart failure ([facility])  Wt Readings from Last 3 Encounters:   05/12/25 65 kg (143 lb 6.4 oz)   02/11/25 64.1 kg (141 lb 5 oz)   01/20/25 64.1 kg (141 lb 5 oz)     Euvolemic  Resume PTA Lasix 40 mg daily  Monitor volume status  ARMANDO (acute kidney injury) ([facility])  Baseline creatinine 0.6-0.9.  POA creatinine 1.48  Creatinine now at baseline, resolved  Likely prerenal etiology in setting of sepsis with use of Lasix and losartan  Continue holding losartan, consider resuming tomorrow  COPD (chronic obstructive pulmonary disease) ([facility])  Home meds: Symbicort twice daily  As per pulmonology: Started on Xopenox and atrovent.  Stopped Symbicort as it could increase risk of pneumonia  Consider LAMA if needed daily inhaler  Multiple sclerosis ([facility])  Outpatient follow-up with neurology    VTE Pharmacologic Prophylaxis: VTE Score: 6 High Risk (Score >/= 5) - Pharmacological DVT Prophylaxis Ordered: enoxaparin (Lovenox). Sequential Compression Devices Ordered.    Mobility:   Basic Mobility Inpatient Raw Score: 7  JH-HLM Goal: 2: Bed activities/Dependent transfer  JH-HLM Achieved: 2: Bed activities/Dependent transfer  JH-HLM Goal achieved. Continue to encourage appropriate mobility.    Patient Centered Rounds: I performed bedside rounds with nursing staff today.   Discussions with Specialists or Other Care Team Provider: Case management    Education and Discussions with Family / Patient: Attempted to update  (brother) via phone. Unable to contact.    Current Length of Stay: 4 day(s)  Current Patient Status: Inpatient   Certification  Statement: The patient will continue to require additional inpatient hospital stay due to awaiting neuropsychiatry evaluation  Discharge Plan: Medically stable, awaiting neuropsych evaluation to determine if patient has capacity make informed medical decisions regarding going home versus back to his group home.    Code Status: Level 1 - Full Code    Subjective   Patient assessed at bedside.  Reports breathing is improved.  Cough is also improved.  Refusing to go back to group home.  States that he wants to go home.  Patient has been living at the group home for the last 2 months.      Objective :  Temp:  [97.6 °F (36.4 °C)-98.3 °F (36.8 °C)] 97.6 °F (36.4 °C)  HR:  [] 65  BP: (119-133)/(48-80) 127/51  Resp:  [18] 18  SpO2:  [93 %-97 %] 96 %  O2 Device: None (Room air)    Body mass index is 24.61 kg/m².     Input and Output Summary (last 24 hours):     Intake/Output Summary (Last 24 hours) at 5/16/2025 1150  Last data filed at 5/16/2025 0700  Gross per 24 hour   Intake 545 ml   Output 1175 ml   Net -630 ml       Physical Exam  Vitals reviewed.   Constitutional:       General: He is not in acute distress.     Appearance: Normal appearance. He is not ill-appearing.   HENT:      Head: Normocephalic and atraumatic.     Cardiovascular:      Rate and Rhythm: Normal rate and regular rhythm.      Heart sounds: Normal heart sounds. No murmur heard.  Pulmonary:      Effort: Pulmonary effort is normal. No respiratory distress.      Breath sounds: No wheezing or rales.   Abdominal:      Palpations: Abdomen is soft.      Tenderness: There is no abdominal tenderness.     Musculoskeletal:      Right lower leg: No edema.      Left lower leg: No edema.     Neurological:      Mental Status: He is alert and oriented to person, place, and time.         Lines/Drains:  Lines/Drains/Airways       Active Status       Name Placement date Placement time Site Days    Suprapubic Catheter 20 Fr. 04/29/25  1100  --  17                             Lab Results: I have reviewed the following results:   Results from last 7 days   Lab Units 05/16/25  0524 05/15/25  0547   WBC Thousand/uL 10.84* 12.31*   HEMOGLOBIN g/dL 10.3* 10.7*   HEMATOCRIT % 32.1* 32.6*   PLATELETS Thousands/uL 412* 394*   SEGS PCT %  --  60   LYMPHO PCT %  --  25   MONO PCT %  --  7   EOS PCT %  --  6     Results from last 7 days   Lab Units 05/16/25  0524 05/14/25  0606 05/13/25  0543   SODIUM mmol/L 138   < > 138   POTASSIUM mmol/L 3.8   < > 3.7   CHLORIDE mmol/L 107   < > 107   CO2 mmol/L 24   < > 23   BUN mg/dL 9   < > 21   CREATININE mg/dL 0.69   < > 0.92   ANION GAP mmol/L 7   < > 8   CALCIUM mg/dL 8.6   < > 8.4   ALBUMIN g/dL  --   --  2.8*   TOTAL BILIRUBIN mg/dL  --   --  0.38   ALK PHOS U/L  --   --  57   ALT U/L  --   --  6*   AST U/L  --   --  13   GLUCOSE RANDOM mg/dL 113   < > 96    < > = values in this interval not displayed.     Results from last 7 days   Lab Units 05/12/25  1622   INR  1.02     Results from last 7 days   Lab Units 05/13/25  0751   POC GLUCOSE mg/dl 97         Results from last 7 days   Lab Units 05/16/25  0524 05/15/25  0547 05/12/25  1755 05/12/25  1533   LACTIC ACID mmol/L  --   --  2.1* 3.8*   PROCALCITONIN ng/ml 0.09 0.10  --  0.15       Recent Cultures (last 7 days):   Results from last 7 days   Lab Units 05/13/25  1656 05/12/25  1538 05/12/25  1533   BLOOD CULTURE   --  No Growth at 72 hrs. No Growth at 72 hrs.   URINE CULTURE   --  >100,000 cfu/ml Staphylococcus aureus*  --    LEGIONELLA URINARY ANTIGEN  Negative  --   --        Imaging Results Review: No pertinent imaging studies reviewed.  Other Study Results Review: No additional pertinent studies reviewed.    Last 24 Hours Medication List:     Current Facility-Administered Medications:     acetaminophen (TYLENOL) tablet 650 mg, Q6H PRN    albuterol (PROVENTIL HFA,VENTOLIN HFA) inhaler 2 puff, Q6H PRN    amLODIPine (NORVASC) tablet 10 mg, Daily    atorvastatin (LIPITOR) tablet 80 mg,  Daily With Dinner    baclofen tablet 5 mg, TID    clopidogrel (PLAVIX) tablet 75 mg, Daily    enoxaparin (LOVENOX) subcutaneous injection 40 mg, Q24H CIERA    [Held by provider] furosemide (LASIX) tablet 40 mg, Daily    gabapentin (NEURONTIN) capsule 300 mg, TID    ipratropium (ATROVENT) 0.02 % inhalation solution 0.5 mg, TID    levalbuterol (XOPENEX) inhalation solution 1.25 mg, TID    [Held by provider] losartan (COZAAR) tablet 50 mg, Daily    mirtazapine (REMERON) tablet 7.5 mg, HS    pantoprazole (PROTONIX) EC tablet 40 mg, Daily    Administrative Statements   Today, Patient Was Seen By: Kenia Basurto, DO  I have spent a total time of 35 minutes in caring for this patient on the day of the visit/encounter including Impressions, Counseling / Coordination of care, Documenting in the medical record, Reviewing/placing orders in the medical record (including tests, medications, and/or procedures), Obtaining or reviewing history  , and Communicating with other healthcare professionals .    **Please Note: This note may have been constructed using a voice recognition system.**

## 2025-05-16 NOTE — ASSESSMENT & PLAN NOTE
Blood pressure stable  Continue amlodipine 10 mg daily  Resume Lasix 40 mg once daily  Hold losartan 50 mg once daily, consider resuming tomorrow

## 2025-05-17 LAB
ANION GAP SERPL CALCULATED.3IONS-SCNC: 8 MMOL/L (ref 4–13)
BACTERIA BLD CULT: NORMAL
BACTERIA BLD CULT: NORMAL
BASOPHILS # BLD AUTO: 0.09 THOUSANDS/ÂΜL (ref 0–0.1)
BASOPHILS NFR BLD AUTO: 1 % (ref 0–1)
BUN SERPL-MCNC: 9 MG/DL (ref 5–25)
CALCIUM SERPL-MCNC: 9 MG/DL (ref 8.4–10.2)
CHLORIDE SERPL-SCNC: 104 MMOL/L (ref 96–108)
CO2 SERPL-SCNC: 28 MMOL/L (ref 21–32)
CREAT SERPL-MCNC: 0.75 MG/DL (ref 0.6–1.3)
EOSINOPHIL # BLD AUTO: 0.48 THOUSAND/ÂΜL (ref 0–0.61)
EOSINOPHIL NFR BLD AUTO: 4 % (ref 0–6)
ERYTHROCYTE [DISTWIDTH] IN BLOOD BY AUTOMATED COUNT: 16 % (ref 11.6–15.1)
GFR SERPL CREATININE-BSD FRML MDRD: 89 ML/MIN/1.73SQ M
GLUCOSE SERPL-MCNC: 120 MG/DL (ref 65–140)
HCT VFR BLD AUTO: 34.1 % (ref 36.5–49.3)
HGB BLD-MCNC: 11 G/DL (ref 12–17)
IMM GRANULOCYTES # BLD AUTO: 0.16 THOUSAND/UL (ref 0–0.2)
IMM GRANULOCYTES NFR BLD AUTO: 1 % (ref 0–2)
LYMPHOCYTES # BLD AUTO: 3.64 THOUSANDS/ÂΜL (ref 0.6–4.47)
LYMPHOCYTES NFR BLD AUTO: 33 % (ref 14–44)
MCH RBC QN AUTO: 26.8 PG (ref 26.8–34.3)
MCHC RBC AUTO-ENTMCNC: 32.3 G/DL (ref 31.4–37.4)
MCV RBC AUTO: 83 FL (ref 82–98)
MONOCYTES # BLD AUTO: 0.67 THOUSAND/ÂΜL (ref 0.17–1.22)
MONOCYTES NFR BLD AUTO: 6 % (ref 4–12)
NEUTROPHILS # BLD AUTO: 6.05 THOUSANDS/ÂΜL (ref 1.85–7.62)
NEUTS SEG NFR BLD AUTO: 55 % (ref 43–75)
NRBC BLD AUTO-RTO: 0 /100 WBCS
PLATELET # BLD AUTO: 416 THOUSANDS/UL (ref 149–390)
PMV BLD AUTO: 8 FL (ref 8.9–12.7)
POTASSIUM SERPL-SCNC: 4 MMOL/L (ref 3.5–5.3)
RBC # BLD AUTO: 4.1 MILLION/UL (ref 3.88–5.62)
SODIUM SERPL-SCNC: 140 MMOL/L (ref 135–147)
WBC # BLD AUTO: 11.09 THOUSAND/UL (ref 4.31–10.16)

## 2025-05-17 PROCEDURE — 80048 BASIC METABOLIC PNL TOTAL CA: CPT | Performed by: STUDENT IN AN ORGANIZED HEALTH CARE EDUCATION/TRAINING PROGRAM

## 2025-05-17 PROCEDURE — 99232 SBSQ HOSP IP/OBS MODERATE 35: CPT | Performed by: STUDENT IN AN ORGANIZED HEALTH CARE EDUCATION/TRAINING PROGRAM

## 2025-05-17 PROCEDURE — 94760 N-INVAS EAR/PLS OXIMETRY 1: CPT

## 2025-05-17 PROCEDURE — 94640 AIRWAY INHALATION TREATMENT: CPT

## 2025-05-17 PROCEDURE — 94664 DEMO&/EVAL PT USE INHALER: CPT

## 2025-05-17 PROCEDURE — 85025 COMPLETE CBC W/AUTO DIFF WBC: CPT | Performed by: STUDENT IN AN ORGANIZED HEALTH CARE EDUCATION/TRAINING PROGRAM

## 2025-05-17 RX ORDER — BENZONATATE 100 MG/1
100 CAPSULE ORAL 3 TIMES DAILY PRN
Status: DISCONTINUED | OUTPATIENT
Start: 2025-05-17 | End: 2025-05-21 | Stop reason: HOSPADM

## 2025-05-17 RX ORDER — GUAIFENESIN 600 MG/1
600 TABLET, EXTENDED RELEASE ORAL EVERY 12 HOURS SCHEDULED
Status: DISCONTINUED | OUTPATIENT
Start: 2025-05-17 | End: 2025-05-21 | Stop reason: HOSPADM

## 2025-05-17 RX ADMIN — ATORVASTATIN CALCIUM 80 MG: 80 TABLET, FILM COATED ORAL at 17:06

## 2025-05-17 RX ADMIN — BACLOFEN 5 MG: 10 TABLET ORAL at 17:06

## 2025-05-17 RX ADMIN — GABAPENTIN 300 MG: 300 CAPSULE ORAL at 08:06

## 2025-05-17 RX ADMIN — CLOPIDOGREL BISULFATE 75 MG: 75 TABLET, FILM COATED ORAL at 08:06

## 2025-05-17 RX ADMIN — BACLOFEN 5 MG: 10 TABLET ORAL at 08:06

## 2025-05-17 RX ADMIN — GABAPENTIN 300 MG: 300 CAPSULE ORAL at 17:06

## 2025-05-17 RX ADMIN — LEVALBUTEROL HYDROCHLORIDE 1.25 MG: 1.25 SOLUTION RESPIRATORY (INHALATION) at 14:32

## 2025-05-17 RX ADMIN — PANTOPRAZOLE SODIUM 40 MG: 40 TABLET, DELAYED RELEASE ORAL at 08:06

## 2025-05-17 RX ADMIN — BACLOFEN 5 MG: 10 TABLET ORAL at 21:57

## 2025-05-17 RX ADMIN — LEVALBUTEROL HYDROCHLORIDE 1.25 MG: 1.25 SOLUTION RESPIRATORY (INHALATION) at 07:43

## 2025-05-17 RX ADMIN — GABAPENTIN 300 MG: 300 CAPSULE ORAL at 21:57

## 2025-05-17 RX ADMIN — MIRTAZAPINE 7.5 MG: 15 TABLET, FILM COATED ORAL at 21:57

## 2025-05-17 RX ADMIN — IPRATROPIUM BROMIDE 0.5 MG: 0.5 SOLUTION RESPIRATORY (INHALATION) at 19:47

## 2025-05-17 RX ADMIN — IPRATROPIUM BROMIDE 0.5 MG: 0.5 SOLUTION RESPIRATORY (INHALATION) at 14:32

## 2025-05-17 RX ADMIN — GUAIFENESIN 600 MG: 600 TABLET, EXTENDED RELEASE ORAL at 21:57

## 2025-05-17 RX ADMIN — LEVALBUTEROL HYDROCHLORIDE 1.25 MG: 1.25 SOLUTION RESPIRATORY (INHALATION) at 19:47

## 2025-05-17 RX ADMIN — IPRATROPIUM BROMIDE 0.5 MG: 0.5 SOLUTION RESPIRATORY (INHALATION) at 07:43

## 2025-05-17 RX ADMIN — ENOXAPARIN SODIUM 40 MG: 40 INJECTION SUBCUTANEOUS at 08:06

## 2025-05-17 RX ADMIN — ACETAMINOPHEN 650 MG: 325 TABLET ORAL at 08:07

## 2025-05-17 NOTE — RESTORATIVE TECHNICIAN NOTE
Restorative Technician Note      Patient Name: Mathew Rico     Restorative Tech Visit Date: 05/17/25  Note Type: Mobility  Patient Position Upon Consult: Supine  Activity Performed: Repositioned (Patient Sat EOB)  Assistive Device: Other (Comment) (HHAx2)  Patient Position at End of Consult: Supine; All needs within reach; Bed/Chair alarm activated  Nurse Communication: -- (Nurse aware of consult.)    Monserrat Landry Restorative Tech

## 2025-05-17 NOTE — CASE MANAGEMENT
Case Management Progress Note    Patient name Mathew Rico  Location [facility]-01 MRN [id number]  : 1950 Date 2025       LOS (days): 5  Geometric Mean LOS (GMLOS) (days): 4.9  Days to GMLOS:0.2        OBJECTIVE:        Current admission status: Inpatient  Preferred Pharmacy:   [facility]/pharmacy #[id number] - [place] - [address]  [address] [facility] [address]  Phone: [phone] Fax: [phone]    [place], [place] -  [address]  [facility] [place] [address]  Phone: [phone] Fax: [phone]    [facility] [place], [place] - [address]  [facility],  [address]  [facility],  First Floor [facility] [address]  Phone: [phone] Fax: [phone]     PHARMACY [facility]. [place], [place] - [address] [place] [address]  Phone: [phone] Fax: [phone]    Primary Care Provider: Carolina Kumari MD    Primary Insurance: [employer] [facility]  Secondary Insurance:     PROGRESS NOTE:    No anticipated DC date at this time  Pending neuropsych eval to determine level of capacity   Pending [facility] contact/plan  CM following

## 2025-05-17 NOTE — ASSESSMENT & PLAN NOTE
"Lab Results   Component Value Date    HGBA1C 6.3 (H) 02/10/2025       No results for input(s): \"POCGLU\" in the last 72 hours.      Blood Sugar Average: Last 72 hrs:    Home medications: Empagliflozin, Januvia, Flonase 1000 mg daily,  Sliding scale  Diabetic diet  Goal blood glucose 140-180  "

## 2025-05-17 NOTE — PLAN OF CARE
Problem: Potential for Falls  Goal: Patient will remain free of falls  Description: INTERVENTIONS:- Educate patient/family on patient safety including physical limitations- Instruct patient to call for assistance with activity - Consult OT/PT to assist with strengthening/mobility - Keep Call bell within reach- Keep bed low and locked with side rails adjusted as appropriate- Keep care items and personal belongings within reach- Initiate and maintain comfort rounds- Make Fall Risk Sign visible to staff- Offer Toileting every 2 Hours, in advance of need- Initiate/Maintain bed alarm- Obtain necessary fall risk management equipment: non skid socks- Apply yellow socks and bracelet for high fall risk patients- Consider moving patient to room near nurses station  Outcome: Progressing     Problem: Prexisting or High Potential for Compromised Skin Integrity  Goal: Skin integrity is maintained or improved  Description: INTERVENTIONS:- Identify patients at risk for skin breakdown- Assess and monitor skin integrity- Assess and monitor nutrition and hydration status- Monitor labs - Assess for incontinence - Turn and reposition patient- Assist with mobility/ambulation- Relieve pressure over bony prominences- Avoid friction and shearing- Provide appropriate hygiene as needed including keeping skin clean and dry- Evaluate need for skin moisturizer/barrier cream- Collaborate with interdisciplinary team - Patient/family teaching- Consider wound care consult   Outcome: Progressing     Problem: Nutrition/Hydration-ADULT  Goal: Nutrient/Hydration intake appropriate for improving, restoring or maintaining nutritional needs  Description: Monitor and assess patient's nutrition/hydration status for malnutrition. Collaborate with interdisciplinary team and initiate plan and interventions as ordered.  Monitor patient's weight and dietary intake as ordered or per policy. Utilize nutrition screening tool and intervene as necessary. Determine  patient's food preferences and provide high-protein, high-caloric foods as appropriate.     INTERVENTIONS:  - Monitor oral intake, urinary output, labs, and treatment plans  - Assess nutrition and hydration status and recommend course of action  - Evaluate amount of meals eaten  - Assist patient with eating if necessary   - Allow adequate time for meals  - Recommend/ encourage appropriate diets, oral nutritional supplements, and vitamin/mineral supplements  - Order, calculate, and assess calorie counts as needed  - Recommend, monitor, and adjust tube feedings and TPN/PPN based on assessed needs  - Assess need for intravenous fluids  - Provide specific nutrition/hydration education as appropriate  - Include patient/family/caregiver in decisions related to nutrition  Outcome: Progressing

## 2025-05-17 NOTE — ASSESSMENT & PLAN NOTE
Blood pressure stable  Continue amlodipine 10 mg daily  Continue Lasix 40 mg once daily with BP parameters  Hold losartan 50 mg once daily, consider resuming tomorrow

## 2025-05-17 NOTE — ASSESSMENT & PLAN NOTE
Wt Readings from Last 3 Encounters:   05/12/25 65 kg (143 lb 6.4 oz)   02/11/25 64.1 kg (141 lb 5 oz)   01/20/25 64.1 kg (141 lb 5 oz)     Euvolemic  Continue PTA Lasix 40 mg daily with the BP parameters  Monitor volume status

## 2025-05-17 NOTE — PROGRESS NOTES
"Progress Note - Hospitalist   Name: Mathew Rico 74 y.o. male I MRN: [id number]  [facility] Date of Admission: 5/12/2025   Date of Service: 5/17/2025 I Hospital Day: 5    Assessment & Plan  Sepsis ([facility])  74-year-old male with past medical history of hypertension, hyperlipidemia and diabetes, COPD, CVA, CHF, MS with neurogenic bladder and chronic Cage presenting with change in mental status And cough.    As evidenced by leukocytosis (WBC 18), tachycardia, tachypnea, elevated lactate 3.8   CT chest abdomen pelvis with contrast 05/12/2025: Possible aspiration pneumonia, Marked bladder wall thickening possible cystitis.  UCx with MSSA  5/12 BCx negative thus far  Legionella and urine strep antigen negative  Suspect related to aspiration pneumonia, possible cystitis  Completed 5 days of IV antibiotics with Zosyn/ceftriaxone  Urology was consulted for catheter exchange: No catheter exchange during this time  Improving, stable for discharge pending neuropsych evaluation.  Mentation appears to improved from initial admission however currently patient reports that he wants to go home however patient has been residing at a group home for the last 2 months.  Patient refusing to go back to the group home.  CM attempted to discuss with brother who was unable to answer questions directly.  Currently unsafe discharge pending determination from neuropsych evaluation.  Pneumonia  LLL pneumonia, possible aspiration related  Completed 5 days of IV antibiotics with Zosyn/ceftriaxone  Improving  Start Mucinex and Tessalon Perles as needed  Urinary tract infection associated with cystostomy catheter  ([facility])  UCx with MSSA  Completed 5 days of IV antibiotics with Zosyn/ceftriaxone  Type 2 diabetes mellitus without complication, without long-term current use of insulin ([facility])  Lab Results   Component Value Date    HGBA1C 6.3 (H) 02/10/2025       No results for input(s): \"POCGLU\" in the last 72 hours.      Blood Sugar " Average: Last 72 hrs:    Home medications: Empagliflozin, Januvia, Flonase 1000 mg daily,  Sliding scale  Diabetic diet  Goal blood glucose 140-180  Primary hypertension  Blood pressure stable  Continue amlodipine 10 mg daily  Continue Lasix 40 mg once daily with BP parameters  Hold losartan 50 mg once daily, consider resuming tomorrow  GERSON on CPAP  CPAP at night  Hyperlipidemia  Continue atorvastatin 80 mg daily chest  Chronic diastolic congestive heart failure ([facility])  Wt Readings from Last 3 Encounters:   05/12/25 65 kg (143 lb 6.4 oz)   02/11/25 64.1 kg (141 lb 5 oz)   01/20/25 64.1 kg (141 lb 5 oz)     Euvolemic  Continue PTA Lasix 40 mg daily with the BP parameters  Monitor volume status  ARMANDO (acute kidney injury) ([facility])  Baseline creatinine 0.6-0.9.  POA creatinine 1.48  Creatinine now at baseline, resolved  Likely prerenal etiology in setting of sepsis with use of Lasix and losartan  Continue holding losartan, consider resuming tomorrow  COPD (chronic obstructive pulmonary disease) ([facility])  Home meds: Symbicort twice daily  As per pulmonology: Started on Xopenox and atrovent.  Stopped Symbicort as it could increase risk of pneumonia  Consider LAMA if needed daily inhaler  Multiple sclerosis ([facility])  Outpatient follow-up with neurology    VTE Pharmacologic Prophylaxis: VTE Score: 6 High Risk (Score >/= 5) - Pharmacological DVT Prophylaxis Ordered: enoxaparin (Lovenox). Sequential Compression Devices Ordered.    Mobility:   Basic Mobility Inpatient Raw Score: 8  JH-HLM Goal: 3: Sit at edge of bed  JH-HLM Achieved: 2: Bed activities/Dependent transfer  JH-HLM Goal NOT achieved. Continue with multidisciplinary rounding and encourage appropriate mobility to improve upon JH-HLM goals.    Patient Centered Rounds: I performed bedside rounds with nursing staff today.   Discussions with Specialists or Other Care Team Provider: RN    Education and Discussions with Family / Patient: Attempted to update  (brother)  via phone. Unable to contact.    Current Length of Stay: 5 day(s)  Current Patient Status: Inpatient   Certification Statement: The patient will continue to require additional inpatient hospital stay due to awaiting neuropsych evaluation  Discharge Plan: TBD, awaiting neuropsych evaluation    Code Status: Level 1 - Full Code    Subjective   Patient assessed at bedside.  Reports feeling like he cannot think straight today.  Continues to have mild cough.  Denies any shortness of breath.    Objective :  Temp:  [97.8 °F (36.6 °C)-99.2 °F (37.3 °C)] 99.2 °F (37.3 °C)  HR:  [74-97] 94  BP: (104-126)/(44-62) 126/62  Resp:  [16-17] 16  SpO2:  [91 %-97 %] 96 %  O2 Device: None (Room air)  Nasal Cannula O2 Flow Rate (L/min):  [0 L/min] 0 L/min    Body mass index is 24.61 kg/m².     Input and Output Summary (last 24 hours):     Intake/Output Summary (Last 24 hours) at 5/17/2025 1233  Last data filed at 5/17/2025 0900  Gross per 24 hour   Intake 630 ml   Output 1925 ml   Net -1295 ml       Physical Exam  Vitals reviewed.   Constitutional:       General: He is not in acute distress.     Appearance: Normal appearance. He is not ill-appearing.   HENT:      Head: Normocephalic and atraumatic.     Cardiovascular:      Rate and Rhythm: Normal rate and regular rhythm.      Heart sounds: Normal heart sounds.   Pulmonary:      Effort: Pulmonary effort is normal. No respiratory distress.      Breath sounds: No wheezing or rales.   Abdominal:      Palpations: Abdomen is soft.      Tenderness: There is no abdominal tenderness.     Musculoskeletal:      Right lower leg: No edema.      Left lower leg: No edema.     Neurological:      Mental Status: He is alert.      Comments: Oriented to self, place, time and partially to situation.  He reports he is having difficulty thinking straight.         Lines/Drains:  Lines/Drains/Airways       Active Status       Name Placement date Placement time Site Days    Suprapubic Catheter 20 Fr. 04/29/25   1100  --  18                            Lab Results: I have reviewed the following results:   Results from last 7 days   Lab Units 05/17/25  0519   WBC Thousand/uL 11.09*   HEMOGLOBIN g/dL 11.0*   HEMATOCRIT % 34.1*   PLATELETS Thousands/uL 416*   SEGS PCT % 55   LYMPHO PCT % 33   MONO PCT % 6   EOS PCT % 4     Results from last 7 days   Lab Units 05/17/25  0519 05/14/25  0606 05/13/25  0543   SODIUM mmol/L 140   < > 138   POTASSIUM mmol/L 4.0   < > 3.7   CHLORIDE mmol/L 104   < > 107   CO2 mmol/L 28   < > 23   BUN mg/dL 9   < > 21   CREATININE mg/dL 0.75   < > 0.92   ANION GAP mmol/L 8   < > 8   CALCIUM mg/dL 9.0   < > 8.4   ALBUMIN g/dL  --   --  2.8*   TOTAL BILIRUBIN mg/dL  --   --  0.38   ALK PHOS U/L  --   --  57   ALT U/L  --   --  6*   AST U/L  --   --  13   GLUCOSE RANDOM mg/dL 120   < > 96    < > = values in this interval not displayed.     Results from last 7 days   Lab Units 05/12/25  1622   INR  1.02     Results from last 7 days   Lab Units 05/13/25  0751   POC GLUCOSE mg/dl 97         Results from last 7 days   Lab Units 05/16/25  0524 05/15/25  0547 05/12/25  1755 05/12/25  1533   LACTIC ACID mmol/L  --   --  2.1* 3.8*   PROCALCITONIN ng/ml 0.09 0.10  --  0.15       Recent Cultures (last 7 days):   Results from last 7 days   Lab Units 05/13/25  1656 05/12/25  1538 05/12/25  1533   BLOOD CULTURE   --  No Growth After 4 Days. No Growth After 4 Days.   URINE CULTURE   --  >100,000 cfu/ml Staphylococcus aureus*  --    LEGIONELLA URINARY ANTIGEN  Negative  --   --        Imaging Results Review: No pertinent imaging studies reviewed.  Other Study Results Review: No additional pertinent studies reviewed.    Last 24 Hours Medication List:     Current Facility-Administered Medications:     acetaminophen (TYLENOL) tablet 650 mg, Q6H PRN    albuterol (PROVENTIL HFA,VENTOLIN HFA) inhaler 2 puff, Q6H PRN    amLODIPine (NORVASC) tablet 10 mg, Daily    atorvastatin (LIPITOR) tablet 80 mg, Daily With Dinner     baclofen tablet 5 mg, TID    benzonatate (TESSALON PERLES) capsule 100 mg, TID PRN    clopidogrel (PLAVIX) tablet 75 mg, Daily    enoxaparin (LOVENOX) subcutaneous injection 40 mg, Q24H CIERA    furosemide (LASIX) tablet 40 mg, Daily    gabapentin (NEURONTIN) capsule 300 mg, TID    ipratropium (ATROVENT) 0.02 % inhalation solution 0.5 mg, TID    levalbuterol (XOPENEX) inhalation solution 1.25 mg, TID    [Held by provider] losartan (COZAAR) tablet 50 mg, Daily    mirtazapine (REMERON) tablet 7.5 mg, HS    pantoprazole (PROTONIX) EC tablet 40 mg, Daily    Administrative Statements   Today, Patient Was Seen By: Kenia Basurto, DO  I have spent a total time of 35 minutes in caring for this patient on the day of the visit/encounter including Impressions, Counseling / Coordination of care, Documenting in the medical record, Reviewing/placing orders in the medical record (including tests, medications, and/or procedures), Obtaining or reviewing history  , and Communicating with other healthcare professionals .    **Please Note: This note may have been constructed using a voice recognition system.**

## 2025-05-17 NOTE — ASSESSMENT & PLAN NOTE
74-year-old male with past medical history of hypertension, hyperlipidemia and diabetes, COPD, CVA, CHF, MS with neurogenic bladder and chronic Cage presenting with change in mental status And cough.    As evidenced by leukocytosis (WBC 18), tachycardia, tachypnea, elevated lactate 3.8   CT chest abdomen pelvis with contrast 05/12/2025: Possible aspiration pneumonia, Marked bladder wall thickening possible cystitis.  UCx with MSSA  5/12 BCx negative thus far  Legionella and urine strep antigen negative  Suspect related to aspiration pneumonia, possible cystitis  Completed 5 days of IV antibiotics with Zosyn/ceftriaxone  Urology was consulted for catheter exchange: No catheter exchange during this time  Improving, stable for discharge pending neuropsych evaluation.  Mentation appears to improved from initial admission however currently patient reports that he wants to go home however patient has been residing at a group home for the last 2 months.  Patient refusing to go back to the group home.  CM attempted to discuss with brother who was unable to answer questions directly.  Currently unsafe discharge pending determination from neuropsych evaluation.

## 2025-05-17 NOTE — ASSESSMENT & PLAN NOTE
LLL pneumonia, possible aspiration related  Completed 5 days of IV antibiotics with Zosyn/ceftriaxone  Improving  Start Mucinex and Tessalon Perles as needed

## 2025-05-18 LAB
ANION GAP SERPL CALCULATED.3IONS-SCNC: 8 MMOL/L (ref 4–13)
BASOPHILS # BLD AUTO: 0.13 THOUSANDS/ÂΜL (ref 0–0.1)
BASOPHILS NFR BLD AUTO: 1 % (ref 0–1)
BUN SERPL-MCNC: 10 MG/DL (ref 5–25)
CALCIUM SERPL-MCNC: 9.3 MG/DL (ref 8.4–10.2)
CHLORIDE SERPL-SCNC: 104 MMOL/L (ref 96–108)
CO2 SERPL-SCNC: 27 MMOL/L (ref 21–32)
CREAT SERPL-MCNC: 0.82 MG/DL (ref 0.6–1.3)
EOSINOPHIL # BLD AUTO: 0.52 THOUSAND/ÂΜL (ref 0–0.61)
EOSINOPHIL NFR BLD AUTO: 4 % (ref 0–6)
ERYTHROCYTE [DISTWIDTH] IN BLOOD BY AUTOMATED COUNT: 16.5 % (ref 11.6–15.1)
GFR SERPL CREATININE-BSD FRML MDRD: 86 ML/MIN/1.73SQ M
GLUCOSE SERPL-MCNC: 121 MG/DL (ref 65–140)
HCT VFR BLD AUTO: 35.9 % (ref 36.5–49.3)
HGB BLD-MCNC: 11.5 G/DL (ref 12–17)
IMM GRANULOCYTES # BLD AUTO: 0.16 THOUSAND/UL (ref 0–0.2)
IMM GRANULOCYTES NFR BLD AUTO: 1 % (ref 0–2)
LYMPHOCYTES # BLD AUTO: 3.62 THOUSANDS/ÂΜL (ref 0.6–4.47)
LYMPHOCYTES NFR BLD AUTO: 26 % (ref 14–44)
MCH RBC QN AUTO: 26.9 PG (ref 26.8–34.3)
MCHC RBC AUTO-ENTMCNC: 32 G/DL (ref 31.4–37.4)
MCV RBC AUTO: 84 FL (ref 82–98)
MONOCYTES # BLD AUTO: 0.8 THOUSAND/ÂΜL (ref 0.17–1.22)
MONOCYTES NFR BLD AUTO: 6 % (ref 4–12)
NEUTROPHILS # BLD AUTO: 8.92 THOUSANDS/ÂΜL (ref 1.85–7.62)
NEUTS SEG NFR BLD AUTO: 62 % (ref 43–75)
NRBC BLD AUTO-RTO: 0 /100 WBCS
PLATELET # BLD AUTO: 452 THOUSANDS/UL (ref 149–390)
PMV BLD AUTO: 8 FL (ref 8.9–12.7)
POTASSIUM SERPL-SCNC: 4.2 MMOL/L (ref 3.5–5.3)
PROCALCITONIN SERPL-MCNC: 0.07 NG/ML
RBC # BLD AUTO: 4.28 MILLION/UL (ref 3.88–5.62)
SODIUM SERPL-SCNC: 139 MMOL/L (ref 135–147)
WBC # BLD AUTO: 14.15 THOUSAND/UL (ref 4.31–10.16)

## 2025-05-18 PROCEDURE — 99232 SBSQ HOSP IP/OBS MODERATE 35: CPT | Performed by: STUDENT IN AN ORGANIZED HEALTH CARE EDUCATION/TRAINING PROGRAM

## 2025-05-18 PROCEDURE — 85025 COMPLETE CBC W/AUTO DIFF WBC: CPT | Performed by: STUDENT IN AN ORGANIZED HEALTH CARE EDUCATION/TRAINING PROGRAM

## 2025-05-18 PROCEDURE — 80048 BASIC METABOLIC PNL TOTAL CA: CPT | Performed by: STUDENT IN AN ORGANIZED HEALTH CARE EDUCATION/TRAINING PROGRAM

## 2025-05-18 PROCEDURE — 84145 PROCALCITONIN (PCT): CPT | Performed by: STUDENT IN AN ORGANIZED HEALTH CARE EDUCATION/TRAINING PROGRAM

## 2025-05-18 PROCEDURE — 94760 N-INVAS EAR/PLS OXIMETRY 1: CPT

## 2025-05-18 PROCEDURE — 94664 DEMO&/EVAL PT USE INHALER: CPT

## 2025-05-18 PROCEDURE — 94640 AIRWAY INHALATION TREATMENT: CPT

## 2025-05-18 RX ADMIN — CLOPIDOGREL BISULFATE 75 MG: 75 TABLET, FILM COATED ORAL at 08:47

## 2025-05-18 RX ADMIN — GABAPENTIN 300 MG: 300 CAPSULE ORAL at 16:11

## 2025-05-18 RX ADMIN — GABAPENTIN 300 MG: 300 CAPSULE ORAL at 08:47

## 2025-05-18 RX ADMIN — Medication 6 MG: at 00:51

## 2025-05-18 RX ADMIN — ENOXAPARIN SODIUM 40 MG: 40 INJECTION SUBCUTANEOUS at 08:48

## 2025-05-18 RX ADMIN — IPRATROPIUM BROMIDE 0.5 MG: 0.5 SOLUTION RESPIRATORY (INHALATION) at 19:30

## 2025-05-18 RX ADMIN — GABAPENTIN 300 MG: 300 CAPSULE ORAL at 21:14

## 2025-05-18 RX ADMIN — PANTOPRAZOLE SODIUM 40 MG: 40 TABLET, DELAYED RELEASE ORAL at 08:47

## 2025-05-18 RX ADMIN — GUAIFENESIN 600 MG: 600 TABLET, EXTENDED RELEASE ORAL at 08:47

## 2025-05-18 RX ADMIN — BACLOFEN 5 MG: 10 TABLET ORAL at 08:48

## 2025-05-18 RX ADMIN — AMLODIPINE BESYLATE 10 MG: 10 TABLET ORAL at 08:47

## 2025-05-18 RX ADMIN — LEVALBUTEROL HYDROCHLORIDE 1.25 MG: 1.25 SOLUTION RESPIRATORY (INHALATION) at 13:08

## 2025-05-18 RX ADMIN — GUAIFENESIN 600 MG: 600 TABLET, EXTENDED RELEASE ORAL at 21:13

## 2025-05-18 RX ADMIN — LEVALBUTEROL HYDROCHLORIDE 1.25 MG: 1.25 SOLUTION RESPIRATORY (INHALATION) at 19:30

## 2025-05-18 RX ADMIN — FUROSEMIDE 40 MG: 40 TABLET ORAL at 08:47

## 2025-05-18 RX ADMIN — LEVALBUTEROL HYDROCHLORIDE 1.25 MG: 1.25 SOLUTION RESPIRATORY (INHALATION) at 07:19

## 2025-05-18 RX ADMIN — BACLOFEN 5 MG: 10 TABLET ORAL at 16:11

## 2025-05-18 RX ADMIN — MIRTAZAPINE 7.5 MG: 15 TABLET, FILM COATED ORAL at 21:13

## 2025-05-18 RX ADMIN — ATORVASTATIN CALCIUM 80 MG: 80 TABLET, FILM COATED ORAL at 16:11

## 2025-05-18 RX ADMIN — BACLOFEN 5 MG: 10 TABLET ORAL at 21:14

## 2025-05-18 RX ADMIN — IPRATROPIUM BROMIDE 0.5 MG: 0.5 SOLUTION RESPIRATORY (INHALATION) at 07:19

## 2025-05-18 RX ADMIN — Medication 6 MG: at 21:13

## 2025-05-18 RX ADMIN — IPRATROPIUM BROMIDE 0.5 MG: 0.5 SOLUTION RESPIRATORY (INHALATION) at 13:08

## 2025-05-18 NOTE — ASSESSMENT & PLAN NOTE
Baseline creatinine 0.6-0.9.  POA creatinine 1.48  Creatinine now at baseline, resolved  Likely prerenal etiology in setting of sepsis with use of Lasix and losartan  Continue holding losartan, consider resuming tomorrow  Monitor BMP daily

## 2025-05-18 NOTE — ASSESSMENT & PLAN NOTE
LLL pneumonia, possible aspiration related  Completed 5 days of IV antibiotics with Zosyn/ceftriaxone  Improving  Continue Mucinex and Tessalon Perles as needed

## 2025-05-18 NOTE — ASSESSMENT & PLAN NOTE
74-year-old male with past medical history of hypertension, hyperlipidemia and diabetes, COPD, CVA, CHF, MS with neurogenic bladder and chronic Cage presenting with change in mental status And cough.    As evidenced by leukocytosis (WBC 18), tachycardia, tachypnea, elevated lactate 3.8   CT chest abdomen pelvis with contrast 05/12/2025: Possible aspiration pneumonia, Marked bladder wall thickening possible cystitis.  UCx with MSSA  5/12 BCx negative thus far  Legionella and urine strep antigen negative  Suspect related to aspiration pneumonia, possible cystitis  Completed 5 days of IV antibiotics with Zosyn/ceftriaxone  Urology was consulted for catheter exchange: No catheter exchange during this time  Improving, stable for discharge pending neuropsych evaluation.  Mentation appears to improved from initial admission however currently patient reports that he wants to go home however patient has been residing at a group home for the last 2 months.  Patient refusing to go back to the group home.  CM attempted to discuss with brother who was unable to answer questions directly.  Currently unsafe discharge pending determination from neuropsych evaluation.  5/18: Noted to have rising WBC to 14.  Remains afebrile and on room air.  Procalcitonin normal.  Monitor off further antibiotics.  Monitor CBC daily.

## 2025-05-18 NOTE — PLAN OF CARE
Problem: Potential for Falls  Goal: Patient will remain free of falls  Description: INTERVENTIONS:- Educate patient/family on patient safety including physical limitations- Instruct patient to call for assistance with activity - Consult OT/PT to assist with strengthening/mobility - Keep Call bell within reach- Keep bed low and locked with side rails adjusted as appropriate- Keep care items and personal belongings within reach- Initiate and maintain comfort rounds- Make Fall Risk Sign visible to staff- Offer Toileting every 2 Hours, in advance of need- Initiate/Maintain bed alarm- Obtain necessary fall risk management equipment: assistive devices- Apply yellow socks and bracelet for high fall risk patients- Consider moving patient to room near nurses station  Outcome: Progressing     Problem: PAIN - ADULT  Goal: Verbalizes/displays adequate comfort level or baseline comfort level  Description: Interventions:  - Encourage patient to monitor pain and request assistance  - Assess pain using appropriate pain scale  - Administer analgesics as ordered based on type and severity of pain and evaluate response  - Implement non-pharmacological measures as appropriate and evaluate response  - Consider cultural and social influences on pain and pain management  - Notify physician/advanced practitioner if interventions unsuccessful or patient reports new pain  - Educate patient/family on pain management process including their role and importance of  reporting pain   - Provide non-pharmacologic/complimentary pain relief interventions  Outcome: Progressing     Problem: INFECTION - ADULT  Goal: Absence or prevention of progression during hospitalization  Description: INTERVENTIONS:  - Assess and monitor for signs and symptoms of infection  - Monitor lab/diagnostic results  - Monitor all insertion sites, i.e. indwelling lines, tubes, and drains  - Monitor endotracheal if appropriate and nasal secretions for changes in amount and  color  - Patterson appropriate cooling/warming therapies per order  - Administer medications as ordered  - Instruct and encourage patient and family to use good hand hygiene technique  - Identify and instruct in appropriate isolation precautions for identified infection/condition  Outcome: Progressing     Problem: HEMATOLOGIC - ADULT  Goal: Maintains hematologic stability  Description: INTERVENTIONS  - Assess for signs and symptoms of bleeding or hemorrhage  - Monitor labs  - Administer supportive blood products/factors as ordered and appropriate  Outcome: Progressing     Problem: METABOLIC, FLUID AND ELECTROLYTES - ADULT  Goal: Fluid balance maintained  Description: INTERVENTIONS:  - Monitor labs   - Monitor I/O and WT  - Instruct patient on fluid and nutrition as appropriate  - Assess for signs & symptoms of volume excess or deficit  Outcome: Progressing     Problem: MUSCULOSKELETAL - ADULT  Goal: Maintain or return mobility to safest level of function  Description: INTERVENTIONS:  - Assess patient's ability to carry out ADLs; assess patient's baseline for ADL function and identify physical deficits which impact ability to perform ADLs (bathing, care of mouth/teeth, toileting, grooming, dressing, etc.)  - Assess/evaluate cause of self-care deficits   - Assess range of motion  - Assess patient's mobility  - Assess patient's need for assistive devices and provide as appropriate  - Encourage maximum independence but intervene and supervise when necessary  - Involve family in performance of ADLs  - Assess for home care needs following discharge   - Consider OT consult to assist with ADL evaluation and planning for discharge  - Provide patient education as appropriate  Outcome: Progressing     Problem: MUSCULOSKELETAL - ADULT  Goal: Maintain proper alignment of affected body part  Description: INTERVENTIONS:  - Support, maintain and protect limb and body alignment  - Provide patient/ family with appropriate  education  Outcome: Progressing     Problem: NEUROSENSORY - ADULT  Goal: Achieves maximal functionality and self care  Description: INTERVENTIONS  - Monitor swallowing and airway patency with patient fatigue and changes in neurological status  - Encourage and assist patient to increase activity and self care.   - Encourage visually impaired, hearing impaired and aphasic patients to use assistive/communication devices  Outcome: Progressing     Problem: GASTROINTESTINAL - ADULT  Goal: Maintains or returns to baseline bowel function  Description: INTERVENTIONS:  - Assess bowel function  - Encourage oral fluids to ensure adequate hydration  - Administer IV fluids if ordered to ensure adequate hydration  - Administer ordered medications as needed  - Encourage mobilization and activity  - Consider nutritional services referral to assist patient with adequate nutrition and appropriate food choices  Outcome: Progressing     Problem: GASTROINTESTINAL - ADULT  Goal: Maintains adequate nutritional intake  Description: INTERVENTIONS:  - Monitor percentage of each meal consumed  - Identify factors contributing to decreased intake, treat as appropriate  - Assist with meals as needed  - Monitor I&O, weight, and lab values if indicated  - Obtain nutrition services referral as needed  Outcome: Progressing     Problem: Knowledge Deficit  Goal: Patient/family/caregiver demonstrates understanding of disease process, treatment plan, medications, and discharge instructions  Description: Complete learning assessment and assess knowledge base.  Interventions:  - Provide teaching at level of understanding  - Provide teaching via preferred learning methods  Outcome: Progressing     Problem: DISCHARGE PLANNING  Goal: Discharge to home or other facility with appropriate resources  Description: INTERVENTIONS:  - Identify barriers to discharge w/patient and caregiver  - Arrange for needed discharge resources and transportation as appropriate  -  Identify discharge learning needs (meds, wound care, etc.)  - Arrange for interpretive services to assist at discharge as needed  - Refer to Case Management Department for coordinating discharge planning if the patient needs post-hospital services based on physician/advanced practitioner order or complex needs related to functional status, cognitive ability, or social support system  Outcome: Progressing

## 2025-05-18 NOTE — PLAN OF CARE
Problem: Potential for Falls  Goal: Patient will remain free of falls  Description: INTERVENTIONS:- Educate patient/family on patient safety including physical limitations- Instruct patient to call for assistance with activity - Consult OT/PT to assist with strengthening/mobility - Keep Call bell within reach- Keep bed low and locked with side rails adjusted as appropriate- Keep care items and personal belongings within reach- Initiate and maintain comfort rounds- Make Fall Risk Sign visible to staff- Offer Toileting every 2 Hours, in advance of need- Initiate/Maintain bed alarm- Obtain necessary fall risk management equipment: assistive devices- Apply yellow socks and bracelet for high fall risk patients- Consider moving patient to room near nurses station  Outcome: Progressing     Problem: Prexisting or High Potential for Compromised Skin Integrity  Goal: Skin integrity is maintained or improved  Description: INTERVENTIONS:- Identify patients at risk for skin breakdown- Assess and monitor skin integrity- Assess and monitor nutrition and hydration status- Monitor labs - Assess for incontinence - Turn and reposition patient- Assist with mobility/ambulation- Relieve pressure over bony prominences- Avoid friction and shearing- Provide appropriate hygiene as needed including keeping skin clean and dry- Evaluate need for skin moisturizer/barrier cream- Collaborate with interdisciplinary team - Patient/family teaching- Consider wound care consult   Outcome: Progressing     Problem: Nutrition/Hydration-ADULT  Goal: Nutrient/Hydration intake appropriate for improving, restoring or maintaining nutritional needs  Description: Monitor and assess patient's nutrition/hydration status for malnutrition. Collaborate with interdisciplinary team and initiate plan and interventions as ordered.  Monitor patient's weight and dietary intake as ordered or per policy. Utilize nutrition screening tool and intervene as necessary.  Determine patient's food preferences and provide high-protein, high-caloric foods as appropriate.     INTERVENTIONS:  - Monitor oral intake, urinary output, labs, and treatment plans  - Assess nutrition and hydration status and recommend course of action  - Evaluate amount of meals eaten  - Assist patient with eating if necessary   - Allow adequate time for meals  - Recommend/ encourage appropriate diets, oral nutritional supplements, and vitamin/mineral supplements  - Order, calculate, and assess calorie counts as needed  - Recommend, monitor, and adjust tube feedings and TPN/PPN based on assessed needs  - Assess need for intravenous fluids  - Provide specific nutrition/hydration education as appropriate  - Include patient/family/caregiver in decisions related to nutrition  Outcome: Progressing

## 2025-05-18 NOTE — PROGRESS NOTES
Progress Note - Hospitalist   Name: Mathew Rico 74 y.o. male I MRN: [id number]  Unit/Bed#: [facility] 820-01 I Date of Admission: 5/12/2025   Date of Service: 5/18/2025 I Hospital Day: 6    Assessment & Plan  Sepsis (HCC)  74-year-old male with past medical history of hypertension, hyperlipidemia and diabetes, COPD, CVA, CHF, MS with neurogenic bladder and chronic Cage presenting with change in mental status And cough.    As evidenced by leukocytosis (WBC 18), tachycardia, tachypnea, elevated lactate 3.8   CT chest abdomen pelvis with contrast 05/12/2025: Possible aspiration pneumonia, Marked bladder wall thickening possible cystitis.  UCx with MSSA  5/12 BCx negative thus far  Legionella and urine strep antigen negative  Suspect related to aspiration pneumonia, possible cystitis  Completed 5 days of IV antibiotics with Zosyn/ceftriaxone  Urology was consulted for catheter exchange: No catheter exchange during this time  Improving, stable for discharge pending neuropsych evaluation.  Mentation appears to improved from initial admission however currently patient reports that he wants to go home however patient has been residing at a group home for the last 2 months.  Patient refusing to go back to the group home.  CM attempted to discuss with brother who was unable to answer questions directly.  Currently unsafe discharge pending determination from neuropsych evaluation.  5/18: Noted to have rising WBC to 14.  Remains afebrile and on room air.  Procalcitonin normal.  Monitor off further antibiotics.  Monitor CBC daily.  Pneumonia  LLL pneumonia, possible aspiration related  Completed 5 days of IV antibiotics with Zosyn/ceftriaxone  Improving  Continue Mucinex and Tessalon Perles as needed  Urinary tract infection associated with cystostomy catheter  (HCC)  UCx with MSSA  Completed 5 days of IV antibiotics with Zosyn/ceftriaxone  Type 2 diabetes mellitus without complication, without long-term current use of insulin  "(Formerly [facility])  Lab Results   Component Value Date    HGBA1C 6.3 (H) 02/10/2025       No results for input(s): \"POCGLU\" in the last 72 hours.      Blood Sugar Average: Last 72 hrs:    Home medications: Empagliflozin, Januvia, Flonase 1000 mg daily,  Sliding scale  Diabetic diet  Goal blood glucose 140-180  Primary hypertension  Blood pressure stable  Continue amlodipine 10 mg daily  Continue Lasix 40 mg once daily with BP parameters  Hold losartan 50 mg once daily, consider resuming tomorrow  GERSON on CPAP  CPAP at night  Hyperlipidemia  Continue atorvastatin 80 mg daily chest  Chronic diastolic congestive heart failure (Formerly [facility])  Wt Readings from Last 3 Encounters:   05/12/25 65 kg (143 lb 6.4 oz)   02/11/25 64.1 kg (141 lb 5 oz)   01/20/25 64.1 kg (141 lb 5 oz)     Euvolemic  Continue PTA Lasix 40 mg daily with the BP parameters  Monitor volume status  ARMANDO (acute kidney injury) (Formerly [facility])  Baseline creatinine 0.6-0.9.  POA creatinine 1.48  Creatinine now at baseline, resolved  Likely prerenal etiology in setting of sepsis with use of Lasix and losartan  Continue holding losartan, consider resuming tomorrow  Monitor BMP daily  COPD (chronic obstructive pulmonary disease) (Formerly [facility])  Home meds: Symbicort twice daily  As per pulmonology: Started on Xopenox and atrovent.  Stopped Symbicort as it could increase risk of pneumonia  Consider LAMA if needed daily inhaler  Multiple sclerosis (Formerly [facility])  Outpatient follow-up with neurology    VTE Pharmacologic Prophylaxis: VTE Score: 6 High Risk (Score >/= 5) - Pharmacological DVT Prophylaxis Ordered: enoxaparin (Lovenox). Sequential Compression Devices Ordered.    Mobility:   Basic Mobility Inpatient Raw Score: 8  JH-HLM Goal: 3: Sit at edge of bed  JH-HLM Achieved: 2: Bed activities/Dependent transfer  JH-HLM Goal NOT achieved. Continue with multidisciplinary rounding and encourage appropriate mobility to improve upon JH-HLM goals.    Patient Centered Rounds: I performed bedside rounds with nursing " staff today.   Discussions with Specialists or Other Care Team Provider: RN    Education and Discussions with Family / Patient: Patient declined call to .     Current Length of Stay: 6 day(s)  Current Patient Status: Inpatient   Certification Statement: The patient will continue to require additional inpatient hospital stay due to awaiting neuropsych evaluation  Discharge Plan: Anticipate discharge in 24-48 hrs to group home.    Code Status: Level 1 - Full Code    Subjective   Patient assessed at bedside.  Reports breathing is improved.  Continues to have mild cough.  No other acute complaints.  Reports still feeling slightly confused although remains oriented x 3.  Per RN, patient was not orientated earlier this morning.    Objective :  Temp:  [97.5 °F (36.4 °C)-98.5 °F (36.9 °C)] 97.9 °F (36.6 °C)  HR:  [88-94] 88  BP: (124-146)/(56-68) 146/68  Resp:  [18-20] 19  SpO2:  [93 %-97 %] 97 %  O2 Device: None (Room air)    Body mass index is 24.61 kg/m².     Input and Output Summary (last 24 hours):     Intake/Output Summary (Last 24 hours) at 5/18/2025 1452  Last data filed at 5/18/2025 1308  Gross per 24 hour   Intake 720 ml   Output 3000 ml   Net -2280 ml       Physical Exam  Vitals reviewed.   Constitutional:       General: He is not in acute distress.     Appearance: Normal appearance. He is not ill-appearing.   HENT:      Head: Normocephalic and atraumatic.     Cardiovascular:      Rate and Rhythm: Normal rate and regular rhythm.      Heart sounds: Normal heart sounds.   Pulmonary:      Effort: Pulmonary effort is normal. No respiratory distress.      Breath sounds: No wheezing or rales.   Abdominal:      Palpations: Abdomen is soft.      Tenderness: There is no abdominal tenderness.     Musculoskeletal:      Right lower leg: No edema.      Left lower leg: No edema.     Neurological:      Mental Status: He is alert and oriented to person, place, and time.         Lines/Drains:  Lines/Drains/Airways        Active Status       Name Placement date Placement time Site Days    Suprapubic Catheter 20 Fr. 04/29/25  1100  --  19                            Lab Results: I have reviewed the following results:   Results from last 7 days   Lab Units 05/18/25  0526   WBC Thousand/uL 14.15*   HEMOGLOBIN g/dL 11.5*   HEMATOCRIT % 35.9*   PLATELETS Thousands/uL 452*   SEGS PCT % 62   LYMPHO PCT % 26   MONO PCT % 6   EOS PCT % 4     Results from last 7 days   Lab Units 05/18/25  0526 05/14/25  0606 05/13/25  0543   SODIUM mmol/L 139   < > 138   POTASSIUM mmol/L 4.2   < > 3.7   CHLORIDE mmol/L 104   < > 107   CO2 mmol/L 27   < > 23   BUN mg/dL 10   < > 21   CREATININE mg/dL 0.82   < > 0.92   ANION GAP mmol/L 8   < > 8   CALCIUM mg/dL 9.3   < > 8.4   ALBUMIN g/dL  --   --  2.8*   TOTAL BILIRUBIN mg/dL  --   --  0.38   ALK PHOS U/L  --   --  57   ALT U/L  --   --  6*   AST U/L  --   --  13   GLUCOSE RANDOM mg/dL 121   < > 96    < > = values in this interval not displayed.     Results from last 7 days   Lab Units 05/12/25  1622   INR  1.02     Results from last 7 days   Lab Units 05/13/25  0751   POC GLUCOSE mg/dl 97         Results from last 7 days   Lab Units 05/18/25  0526 05/16/25  0524 05/15/25  0547 05/12/25  1755 05/12/25  1533   LACTIC ACID mmol/L  --   --   --  2.1* 3.8*   PROCALCITONIN ng/ml 0.07 0.09 0.10  --  0.15       Recent Cultures (last 7 days):   Results from last 7 days   Lab Units 05/13/25  1656 05/12/25  1538 05/12/25  1533   BLOOD CULTURE   --  No Growth After 5 Days. No Growth After 5 Days.   URINE CULTURE   --  >100,000 cfu/ml Staphylococcus aureus*  --    LEGIONELLA URINARY ANTIGEN  Negative  --   --        Imaging Results Review: No pertinent imaging studies reviewed.  Other Study Results Review: No additional pertinent studies reviewed.    Last 24 Hours Medication List:     Current Facility-Administered Medications:     acetaminophen (TYLENOL) tablet 650 mg, Q6H PRN    albuterol (PROVENTIL HFA,VENTOLIN HFA)  inhaler 2 puff, Q6H PRN    amLODIPine (NORVASC) tablet 10 mg, Daily    atorvastatin (LIPITOR) tablet 80 mg, Daily With Dinner    baclofen tablet 5 mg, TID    benzonatate (TESSALON PERLES) capsule 100 mg, TID PRN    clopidogrel (PLAVIX) tablet 75 mg, Daily    enoxaparin (LOVENOX) subcutaneous injection 40 mg, Q24H CIERA    furosemide (LASIX) tablet 40 mg, Daily    gabapentin (NEURONTIN) capsule 300 mg, TID    guaiFENesin (MUCINEX) 12 hr tablet 600 mg, Q12H CIERA    ipratropium (ATROVENT) 0.02 % inhalation solution 0.5 mg, TID    levalbuterol (XOPENEX) inhalation solution 1.25 mg, TID    [Held by provider] losartan (COZAAR) tablet 50 mg, Daily    melatonin tablet 6 mg, HS    mirtazapine (REMERON) tablet 7.5 mg, HS    pantoprazole (PROTONIX) EC tablet 40 mg, Daily    Administrative Statements   Today, Patient Was Seen By: Kenia Basurto, DO  I have spent a total time of 35 minutes in caring for this patient on the day of the visit/encounter including Impressions, Counseling / Coordination of care, Documenting in the medical record, Reviewing/placing orders in the medical record (including tests, medications, and/or procedures), Obtaining or reviewing history  , and Communicating with other healthcare professionals .    **Please Note: This note may have been constructed using a voice recognition system.**

## 2025-05-19 LAB
ANION GAP SERPL CALCULATED.3IONS-SCNC: 7 MMOL/L (ref 4–13)
BASOPHILS # BLD AUTO: 0.09 THOUSANDS/ÂΜL (ref 0–0.1)
BASOPHILS NFR BLD AUTO: 1 % (ref 0–1)
BUN SERPL-MCNC: 12 MG/DL (ref 5–25)
CALCIUM SERPL-MCNC: 9.3 MG/DL (ref 8.4–10.2)
CHLORIDE SERPL-SCNC: 102 MMOL/L (ref 96–108)
CO2 SERPL-SCNC: 28 MMOL/L (ref 21–32)
CREAT SERPL-MCNC: 0.88 MG/DL (ref 0.6–1.3)
EOSINOPHIL # BLD AUTO: 0.52 THOUSAND/ÂΜL (ref 0–0.61)
EOSINOPHIL NFR BLD AUTO: 4 % (ref 0–6)
ERYTHROCYTE [DISTWIDTH] IN BLOOD BY AUTOMATED COUNT: 16.7 % (ref 11.6–15.1)
GFR SERPL CREATININE-BSD FRML MDRD: 84 ML/MIN/1.73SQ M
GLUCOSE SERPL-MCNC: 140 MG/DL (ref 65–140)
HCT VFR BLD AUTO: 37.4 % (ref 36.5–49.3)
HGB BLD-MCNC: 11.9 G/DL (ref 12–17)
IMM GRANULOCYTES # BLD AUTO: 0.11 THOUSAND/UL (ref 0–0.2)
IMM GRANULOCYTES NFR BLD AUTO: 1 % (ref 0–2)
LYMPHOCYTES # BLD AUTO: 4.02 THOUSANDS/ÂΜL (ref 0.6–4.47)
LYMPHOCYTES NFR BLD AUTO: 31 % (ref 14–44)
MCH RBC QN AUTO: 26.7 PG (ref 26.8–34.3)
MCHC RBC AUTO-ENTMCNC: 31.8 G/DL (ref 31.4–37.4)
MCV RBC AUTO: 84 FL (ref 82–98)
MONOCYTES # BLD AUTO: 0.74 THOUSAND/ÂΜL (ref 0.17–1.22)
MONOCYTES NFR BLD AUTO: 6 % (ref 4–12)
NEUTROPHILS # BLD AUTO: 7.51 THOUSANDS/ÂΜL (ref 1.85–7.62)
NEUTS SEG NFR BLD AUTO: 57 % (ref 43–75)
NRBC BLD AUTO-RTO: 0 /100 WBCS
PLATELET # BLD AUTO: 458 THOUSANDS/UL (ref 149–390)
PMV BLD AUTO: 8 FL (ref 8.9–12.7)
POTASSIUM SERPL-SCNC: 4 MMOL/L (ref 3.5–5.3)
RBC # BLD AUTO: 4.45 MILLION/UL (ref 3.88–5.62)
SODIUM SERPL-SCNC: 137 MMOL/L (ref 135–147)
WBC # BLD AUTO: 12.99 THOUSAND/UL (ref 4.31–10.16)

## 2025-05-19 PROCEDURE — 85025 COMPLETE CBC W/AUTO DIFF WBC: CPT | Performed by: STUDENT IN AN ORGANIZED HEALTH CARE EDUCATION/TRAINING PROGRAM

## 2025-05-19 PROCEDURE — 99232 SBSQ HOSP IP/OBS MODERATE 35: CPT | Performed by: STUDENT IN AN ORGANIZED HEALTH CARE EDUCATION/TRAINING PROGRAM

## 2025-05-19 PROCEDURE — 92526 ORAL FUNCTION THERAPY: CPT

## 2025-05-19 PROCEDURE — 80048 BASIC METABOLIC PNL TOTAL CA: CPT | Performed by: STUDENT IN AN ORGANIZED HEALTH CARE EDUCATION/TRAINING PROGRAM

## 2025-05-19 RX ADMIN — AMLODIPINE BESYLATE 10 MG: 10 TABLET ORAL at 09:22

## 2025-05-19 RX ADMIN — Medication 6 MG: at 21:27

## 2025-05-19 RX ADMIN — GABAPENTIN 300 MG: 300 CAPSULE ORAL at 17:23

## 2025-05-19 RX ADMIN — BACLOFEN 5 MG: 10 TABLET ORAL at 21:27

## 2025-05-19 RX ADMIN — CLOPIDOGREL BISULFATE 75 MG: 75 TABLET, FILM COATED ORAL at 09:22

## 2025-05-19 RX ADMIN — ENOXAPARIN SODIUM 40 MG: 40 INJECTION SUBCUTANEOUS at 09:22

## 2025-05-19 RX ADMIN — ACETAMINOPHEN 650 MG: 325 TABLET ORAL at 21:27

## 2025-05-19 RX ADMIN — MIRTAZAPINE 7.5 MG: 15 TABLET, FILM COATED ORAL at 21:27

## 2025-05-19 RX ADMIN — GUAIFENESIN 600 MG: 600 TABLET, EXTENDED RELEASE ORAL at 21:27

## 2025-05-19 RX ADMIN — FUROSEMIDE 40 MG: 40 TABLET ORAL at 09:22

## 2025-05-19 RX ADMIN — GABAPENTIN 300 MG: 300 CAPSULE ORAL at 09:22

## 2025-05-19 RX ADMIN — GUAIFENESIN 600 MG: 600 TABLET, EXTENDED RELEASE ORAL at 09:22

## 2025-05-19 RX ADMIN — PANTOPRAZOLE SODIUM 40 MG: 40 TABLET, DELAYED RELEASE ORAL at 09:22

## 2025-05-19 RX ADMIN — BACLOFEN 5 MG: 10 TABLET ORAL at 09:22

## 2025-05-19 RX ADMIN — ATORVASTATIN CALCIUM 80 MG: 80 TABLET, FILM COATED ORAL at 17:23

## 2025-05-19 RX ADMIN — GABAPENTIN 300 MG: 300 CAPSULE ORAL at 21:27

## 2025-05-19 RX ADMIN — BACLOFEN 5 MG: 10 TABLET ORAL at 17:23

## 2025-05-19 NOTE — ASSESSMENT & PLAN NOTE
Blood pressure stable  Continue amlodipine 10 mg daily  Continue Lasix 40 mg once daily with BP parameters  Hold losartan 50 mg once daily, consider resuming at discharge

## 2025-05-19 NOTE — SPEECH THERAPY NOTE
Speech-Language Pathology Progress Note      Patient Name: Mathew Rico    Today's Date: 5/19/2025      Subjective:  Pt was awake and alert. He was sitting upright in bed.     Objective:  Pt was seen today for dysphagia therapy. Current diet is dysphagia 2 mechanical soft with thin liquids. Pt was on room air. Oral care had already been completed. Focus of today's session was to maximize PO intake safety and improve pt's self monitoring. Textures offered today included regular solid and thin liquid via straw.  Swallow function:   Bolus retrieval and control were adequate. Mastication and bolus formation were slow. Pharyngeal swallow initiation was prompt. Hyolaryngeal excursion was adequate. No s/s aspiration occurred during session today.    Assessment:  Swallow function is stable with current diet. Diet texture and liquid consistency remains appropriate at this time.  Pt expressed some dissatisfaction with chopped diet, will plan for advancement trial.     Plan:  Continue dysphagia 2 mechanical soft and thin liquids now. Continue ST follow up. Subsequent sessions to focus on maximizing PO intake safety and determining potential for diet texture advancement.

## 2025-05-19 NOTE — DISCHARGE INSTR - OTHER ORDERS
Skin Care Plan:  1-Apply calazime cream to B/L Sacro-Buttocks three times a day or as needed soilage/incontinence.   2-Turn/reposition every 2 hours or when medically stable for pressure re-distribution on skin.  3-Elevate heels to offload pressure.  4-Moisturize skin daily with skin nourishing cream  5-Ehob cushion in chair when out of bed.  6-Preventative Hydraguard to bilateral heels twice a day and as needed.

## 2025-05-19 NOTE — PLAN OF CARE
Problem: Potential for Falls  Goal: Patient will remain free of falls  Description: INTERVENTIONS:- Educate patient/family on patient safety including physical limitations- Instruct patient to call for assistance with activity - Consult OT/PT to assist with strengthening/mobility - Keep Call bell within reach- Keep bed low and locked with side rails adjusted as appropriate- Keep care items and personal belongings within reach- Initiate and maintain comfort rounds- Make Fall Risk Sign visible to staff- Offer Toileting every 2 Hours, in advance of need- Initiate/Maintain bed alarm- Obtain necessary fall risk management equipment: assistive devices- Apply yellow socks and bracelet for high fall risk patients- Consider moving patient to room near nurses station  Outcome: Progressing

## 2025-05-19 NOTE — ASSESSMENT & PLAN NOTE
74-year-old male with past medical history of hypertension, hyperlipidemia and diabetes, COPD, CVA, CHF, MS with neurogenic bladder and chronic Cage presenting with change in mental status And cough.    As evidenced by leukocytosis (WBC 18), tachycardia, tachypnea, elevated lactate 3.8   CT chest abdomen pelvis with contrast 05/12/2025: Possible aspiration pneumonia, Marked bladder wall thickening possible cystitis.  UCx with MSSA  5/12 BCx negative thus far  Legionella and urine strep antigen negative  Suspect related to aspiration pneumonia, possible cystitis  Completed 5 days of IV antibiotics with Zosyn/ceftriaxone  Urology was consulted for catheter exchange: No catheter exchange during this time  Improving, stable for discharge pending neuropsych evaluation.  Mentation appears to improved from initial admission however currently patient reports that he wants to go home however patient has been residing at a group home for the last 2 months.  Patient refusing to go back to the group home.  CM attempted to discuss with brother who was unable to answer questions directly.  Currently unsafe discharge pending determination from neuropsych evaluation and facility.   Afebrile, vital signs stable, leukocytosis stable.  Medically stable for discharge neuropsych evaluation.

## 2025-05-19 NOTE — WOUND OSTOMY CARE
Consult Note - Wound   Mathew Rico 74 y.o. male MRN: [id number]  Unit/Bed#: [facility] 820-01 Encounter: [id number]        History and Present Illness:  Patient is a 75 yo male that was admitted to [facility] for treatment of sepsis. PMH of hypertension, hyperlipidemia, congestive heart failure, obstructive sleep apnea. Patient is a min assist for turning and repositioning. Patient is incontinent of bowel and bladder is managed via internal urinary catheter. On assessment, patient is seen lying on regular mattress.     Wound Care was consulted for sacrum.     Assessment Findings:   B/L heels are intact and blanchable - no wounds or skin loss noted to areas. Recommend preventative Hydraguard Cream and proper offloading/ repositioning.        Right Buttocks MASD/IAD: irregular in shape area of partial thickness skin loss. Wound bed is pink in color and blanchable. Rosa-wound is fragile. Scant sanguinous drainage noted.     No induration, fluctuance, odor, warmth/temperature differences, redness, or purulence noted to the above noted wounds and skin areas assessed. New dressings applied per orders listed below. Patient tolerated well- no s/s of non-verbal pain or discomfort observed during the encounter. Bedside nurse aware of plan of care. See flow sheets for more detailed assessment findings.      Orders listed below and wound care will continue to follow, call or Secure Chat Message with questions.     Skin Care Plan:  1-Apply calazime cream to B/L Sacro-Buttocks three times a day or as needed soilage/incontinence.   2-Turn/reposition every 2 hours or when medically stable for pressure re-distribution on skin.  3-Elevate heels to offload pressure.  4-Moisturize skin daily with skin nourishing cream  5-Ehob cushion in chair when out of bed.  6-Preventative Hydraguard to bilateral heels twice a day and as needed.       Wounds:  Wound 05/14/25 Irritant Contact Dermatitis Incontinence Buttocks Right (Active)   Wound  Image   05/19/25 0959   Wound Description Pink 05/19/25 1400   Non-staged Wound Description Partial thickness 05/19/25 1400   Wound Length (cm) 1.5 cm 05/19/25 1400   Wound Width (cm) 1.5 cm 05/19/25 1400   Wound Depth (cm) 0.1 cm 05/19/25 1400   Wound Surface Area (cm^2) 1.77 cm^2 05/19/25 1400   Wound Volume (cm^3) 0.118 cm^3 05/19/25 1400   Calculated Wound Volume (cm^3) 0.23 cm^3 05/19/25 1400   Drainage Amount Scant 05/19/25 1400   Drainage Description Sanguineous 05/19/25 1400   Rosa-wound Assessment Fragile 05/19/25 1400   Treatments Cleansed;Site care 05/19/25 1400   Wound Site Closure None 05/19/25 1400   Dressing Protective barrier 05/19/25 1400   Dressing Changed Changed 05/19/25 1400   Patient Tolerance Tolerated well 05/19/25 1400   Dressing Status Intact 05/19/25 1400               Mattie Meek RN, BSN, CWOCN

## 2025-05-19 NOTE — PROGRESS NOTES
Progress Note - Hospitalist   Name: Mathew Rico 74 y.o. male I MRN: [id number]  Unit/Bed#: [facility] 820-01 I Date of Admission: 5/12/2025   Date of Service: 5/19/2025 I Hospital Day: 7    Assessment & Plan  Sepsis (HCC)  74-year-old male with past medical history of hypertension, hyperlipidemia and diabetes, COPD, CVA, CHF, MS with neurogenic bladder and chronic Cage presenting with change in mental status And cough.    As evidenced by leukocytosis (WBC 18), tachycardia, tachypnea, elevated lactate 3.8   CT chest abdomen pelvis with contrast 05/12/2025: Possible aspiration pneumonia, Marked bladder wall thickening possible cystitis.  UCx with MSSA  5/12 BCx negative thus far  Legionella and urine strep antigen negative  Suspect related to aspiration pneumonia, possible cystitis  Completed 5 days of IV antibiotics with Zosyn/ceftriaxone  Urology was consulted for catheter exchange: No catheter exchange during this time  Improving, stable for discharge pending neuropsych evaluation.  Mentation appears to improved from initial admission however currently patient reports that he wants to go home however patient has been residing at a group home for the last 2 months.  Patient refusing to go back to the group home.  CM attempted to discuss with brother who was unable to answer questions directly.  Currently unsafe discharge pending determination from neuropsych evaluation and facility.   Afebrile, vital signs stable, leukocytosis stable.  Medically stable for discharge neuropsych evaluation.  Pneumonia  LLL pneumonia, possible aspiration related  Completed 5 days of IV antibiotics with Zosyn/ceftriaxone  Improving  Continue Mucinex and Tessalon Perles as needed  Urinary tract infection associated with cystostomy catheter  (Formerly [facility])  UCx with MSSA  Completed 5 days of IV antibiotics with Zosyn/ceftriaxone  Type 2 diabetes mellitus without complication, without long-term current use of insulin (Formerly [facility])  Lab Results   Component  "Value Date    HGBA1C 6.3 (H) 02/10/2025       No results for input(s): \"POCGLU\" in the last 72 hours.      Blood Sugar Average: Last 72 hrs:    Home medications: Empagliflozin, Januvia, Flonase 1000 mg daily,  Sliding scale  Diabetic diet  Goal blood glucose 140-180  Primary hypertension  Blood pressure stable  Continue amlodipine 10 mg daily  Continue Lasix 40 mg once daily with BP parameters  Hold losartan 50 mg once daily, consider resuming at discharge  GERSON on CPAP  CPAP at night  Hyperlipidemia  Continue atorvastatin 80 mg daily chest  Chronic diastolic congestive heart failure (HCC)  Wt Readings from Last 3 Encounters:   05/12/25 65 kg (143 lb 6.4 oz)   02/11/25 64.1 kg (141 lb 5 oz)   01/20/25 64.1 kg (141 lb 5 oz)     Euvolemic  Continue PTA Lasix 40 mg daily with the BP parameters  Monitor volume status  ARMANDO (acute kidney injury) ([facility])  Baseline creatinine 0.6-0.9.  POA creatinine 1.48  Creatinine now at baseline, resolved  Likely prerenal etiology in setting of sepsis with use of Lasix and losartan  Continue holding losartan, consider resuming tomorrow  Monitor BMP daily  COPD (chronic obstructive pulmonary disease) ([facility])  Home meds: Symbicort twice daily  As per pulmonology: Started on Xopenox and atrovent.  Stopped Symbicort as it could increase risk of pneumonia  Consider LAMA if needed daily inhaler  Multiple sclerosis ([facility])  Outpatient follow-up with neurology    VTE Pharmacologic Prophylaxis: VTE Score: 6 High Risk (Score >/= 5) - Pharmacological DVT Prophylaxis Ordered: enoxaparin (Lovenox). Sequential Compression Devices Ordered.    Mobility:   Basic Mobility Inpatient Raw Score: 8  JH-HLM Goal: 3: Sit at edge of bed  JH-HLM Achieved: 3: Sit at edge of bed  JH-HLM Goal achieved. Continue to encourage appropriate mobility.    Patient Centered Rounds: I performed bedside rounds with nursing staff today.   Discussions with Specialists or Other Care Team Provider: RN    Education and Discussions with " Family / Patient: Patient declined call to .     Current Length of Stay: 7 day(s)  Current Patient Status: Inpatient   Certification Statement: The patient will continue to require additional inpatient hospital stay due to awaiting placement, awaiting neuropsych evaluation  Discharge Plan: TBD    Code Status: Level 1 - Full Code    Subjective   Patient assessed at bedside.  No complaints.  Mild cough.    Objective :  Temp:  [97.6 °F (36.4 °C)-98 °F (36.7 °C)] 98 °F (36.7 °C)  HR:  [74-96] 85  BP: (114-123)/(50-61) 116/61  Resp:  [16-18] 16  SpO2:  [92 %-96 %] 92 %  O2 Device: None (Room air)    Body mass index is 24.61 kg/m².     Input and Output Summary (last 24 hours):     Intake/Output Summary (Last 24 hours) at 5/19/2025 1520  Last data filed at 5/19/2025 0857  Gross per 24 hour   Intake 654 ml   Output 200 ml   Net 454 ml       Physical Exam  Vitals reviewed.   Constitutional:       General: He is not in acute distress.     Appearance: Normal appearance. He is not ill-appearing.   HENT:      Head: Normocephalic and atraumatic.     Cardiovascular:      Rate and Rhythm: Normal rate and regular rhythm.   Pulmonary:      Effort: Pulmonary effort is normal. No respiratory distress.      Breath sounds: No wheezing or rales.   Abdominal:      Palpations: Abdomen is soft.      Tenderness: There is no abdominal tenderness.     Musculoskeletal:      Right lower leg: No edema.      Left lower leg: No edema.     Neurological:      Mental Status: He is alert and oriented to person, place, and time. Mental status is at baseline.           Lines/Drains:  Lines/Drains/Airways       Active Status       Name Placement date Placement time Site Days    Suprapubic Catheter 20 Fr. 04/29/25  1100  --  20                            Lab Results: I have reviewed the following results:   Results from last 7 days   Lab Units 05/19/25  0613   WBC Thousand/uL 12.99*   HEMOGLOBIN g/dL 11.9*   HEMATOCRIT % 37.4   PLATELETS  Thousands/uL 458*   SEGS PCT % 57   LYMPHO PCT % 31   MONO PCT % 6   EOS PCT % 4     Results from last 7 days   Lab Units 05/19/25  0613 05/14/25  0606 05/13/25  0543   SODIUM mmol/L 137   < > 138   POTASSIUM mmol/L 4.0   < > 3.7   CHLORIDE mmol/L 102   < > 107   CO2 mmol/L 28   < > 23   BUN mg/dL 12   < > 21   CREATININE mg/dL 0.88   < > 0.92   ANION GAP mmol/L 7   < > 8   CALCIUM mg/dL 9.3   < > 8.4   ALBUMIN g/dL  --   --  2.8*   TOTAL BILIRUBIN mg/dL  --   --  0.38   ALK PHOS U/L  --   --  57   ALT U/L  --   --  6*   AST U/L  --   --  13   GLUCOSE RANDOM mg/dL 140   < > 96    < > = values in this interval not displayed.     Results from last 7 days   Lab Units 05/12/25  1622   INR  1.02     Results from last 7 days   Lab Units 05/13/25  0751   POC GLUCOSE mg/dl 97         Results from last 7 days   Lab Units 05/18/25  0526 05/16/25  0524 05/15/25  0547 05/12/25  1755 05/12/25  1533   LACTIC ACID mmol/L  --   --   --  2.1* 3.8*   PROCALCITONIN ng/ml 0.07 0.09 0.10  --  0.15       Recent Cultures (last 7 days):   Results from last 7 days   Lab Units 05/13/25  1656 05/12/25  1538 05/12/25  1533   BLOOD CULTURE   --  No Growth After 5 Days. No Growth After 5 Days.   URINE CULTURE   --  >100,000 cfu/ml Staphylococcus aureus*  --    LEGIONELLA URINARY ANTIGEN  Negative  --   --        Imaging Results Review: No pertinent imaging studies reviewed.  Other Study Results Review: No additional pertinent studies reviewed.    Last 24 Hours Medication List:     Current Facility-Administered Medications:     acetaminophen (TYLENOL) tablet 650 mg, Q6H PRN    albuterol (PROVENTIL HFA,VENTOLIN HFA) inhaler 2 puff, Q6H PRN    amLODIPine (NORVASC) tablet 10 mg, Daily    atorvastatin (LIPITOR) tablet 80 mg, Daily With Dinner    baclofen tablet 5 mg, TID    benzonatate (TESSALON PERLES) capsule 100 mg, TID PRN    clopidogrel (PLAVIX) tablet 75 mg, Daily    enoxaparin (LOVENOX) subcutaneous injection 40 mg, Q24H CIERA    furosemide  (LASIX) tablet 40 mg, Daily    gabapentin (NEURONTIN) capsule 300 mg, TID    guaiFENesin (MUCINEX) 12 hr tablet 600 mg, Q12H CIERA    [Held by provider] losartan (COZAAR) tablet 50 mg, Daily    melatonin tablet 6 mg, HS    mirtazapine (REMERON) tablet 7.5 mg, HS    pantoprazole (PROTONIX) EC tablet 40 mg, Daily    Administrative Statements   Today, Patient Was Seen By: Kenia Basurto, DO  I have spent a total time of 35 minutes in caring for this patient on the day of the visit/encounter including Impressions, Counseling / Coordination of care, Documenting in the medical record, Reviewing/placing orders in the medical record (including tests, medications, and/or procedures), Obtaining or reviewing history  , and Communicating with other healthcare professionals .    **Please Note: This note may have been constructed using a voice recognition system.**

## 2025-05-19 NOTE — PLAN OF CARE
Problem: Potential for Falls  Goal: Patient will remain free of falls  Description: INTERVENTIONS:- Educate patient/family on patient safety including physical limitations- Instruct patient to call for assistance with activity - Consult OT/PT to assist with strengthening/mobility - Keep Call bell within reach- Keep bed low and locked with side rails adjusted as appropriate- Keep care items and personal belongings within reach- Initiate and maintain comfort rounds- Make Fall Risk Sign visible to staff- Offer Toileting every 2 Hours, in advance of need- Initiate/Maintain bed alarm- Obtain necessary fall risk management equipment: assistive devices- Apply yellow socks and bracelet for high fall risk patients- Consider moving patient to room near nurses station  Outcome: Progressing     Problem: PAIN - ADULT  Goal: Verbalizes/displays adequate comfort level or baseline comfort level  Description: Interventions:  - Encourage patient to monitor pain and request assistance  - Assess pain using appropriate pain scale  - Administer analgesics as ordered based on type and severity of pain and evaluate response  - Implement non-pharmacological measures as appropriate and evaluate response  - Consider cultural and social influences on pain and pain management  - Notify physician/advanced practitioner if interventions unsuccessful or patient reports new pain  - Educate patient/family on pain management process including their role and importance of  reporting pain   - Provide non-pharmacologic/complimentary pain relief interventions  Outcome: Progressing     Problem: Prexisting or High Potential for Compromised Skin Integrity  Goal: Skin integrity is maintained or improved  Description: INTERVENTIONS:- Identify patients at risk for skin breakdown- Assess and monitor skin integrity- Assess and monitor nutrition and hydration status- Monitor labs - Assess for incontinence - Turn and reposition patient- Assist with  mobility/ambulation- Relieve pressure over bony prominences- Avoid friction and shearing- Provide appropriate hygiene as needed including keeping skin clean and dry- Evaluate need for skin moisturizer/barrier cream- Collaborate with interdisciplinary team - Patient/family teaching- Consider wound care consult   Outcome: Progressing     Problem: Nutrition/Hydration-ADULT  Goal: Nutrient/Hydration intake appropriate for improving, restoring or maintaining nutritional needs  Description: Monitor and assess patient's nutrition/hydration status for malnutrition. Collaborate with interdisciplinary team and initiate plan and interventions as ordered.  Monitor patient's weight and dietary intake as ordered or per policy. Utilize nutrition screening tool and intervene as necessary. Determine patient's food preferences and provide high-protein, high-caloric foods as appropriate.     INTERVENTIONS:  - Monitor oral intake, urinary output, labs, and treatment plans  - Assess nutrition and hydration status and recommend course of action  - Evaluate amount of meals eaten  - Assist patient with eating if necessary   - Allow adequate time for meals  - Recommend/ encourage appropriate diets, oral nutritional supplements, and vitamin/mineral supplements  - Order, calculate, and assess calorie counts as needed  - Recommend, monitor, and adjust tube feedings and TPN/PPN based on assessed needs  - Assess need for intravenous fluids  - Provide specific nutrition/hydration education as appropriate  - Include patient/family/caregiver in decisions related to nutrition  Outcome: Progressing     Problem: NEUROSENSORY - ADULT  Goal: Achieves maximal functionality and self care  Description: INTERVENTIONS  - Monitor swallowing and airway patency with patient fatigue and changes in neurological status  - Encourage and assist patient to increase activity and self care.   - Encourage visually impaired, hearing impaired and aphasic patients to use  assistive/communication devices  Outcome: Progressing     Problem: GASTROINTESTINAL - ADULT  Goal: Maintains or returns to baseline bowel function  Description: INTERVENTIONS:  - Assess bowel function  - Encourage oral fluids to ensure adequate hydration  - Administer IV fluids if ordered to ensure adequate hydration  - Administer ordered medications as needed  - Encourage mobilization and activity  - Consider nutritional services referral to assist patient with adequate nutrition and appropriate food choices  Outcome: Progressing     Problem: GASTROINTESTINAL - ADULT  Goal: Maintains adequate nutritional intake  Description: INTERVENTIONS:  - Monitor percentage of each meal consumed  - Identify factors contributing to decreased intake, treat as appropriate  - Assist with meals as needed  - Monitor I&O, weight, and lab values if indicated  - Obtain nutrition services referral as needed  Outcome: Progressing     Problem: GENITOURINARY - ADULT  Goal: Urinary catheter remains patent  Description: INTERVENTIONS:  - Assess patency of urinary catheter  - If patient has a chronic dela cruz, consider changing catheter if non-functioning  - Follow guidelines for intermittent irrigation of non-functioning urinary catheter  Outcome: Progressing     Problem: METABOLIC, FLUID AND ELECTROLYTES - ADULT  Goal: Fluid balance maintained  Description: INTERVENTIONS:  - Monitor labs   - Monitor I/O and WT  - Instruct patient on fluid and nutrition as appropriate  - Assess for signs & symptoms of volume excess or deficit  Outcome: Progressing     Problem: MUSCULOSKELETAL - ADULT  Goal: Maintain or return mobility to safest level of function  Description: INTERVENTIONS:  - Assess patient's ability to carry out ADLs; assess patient's baseline for ADL function and identify physical deficits which impact ability to perform ADLs (bathing, care of mouth/teeth, toileting, grooming, dressing, etc.)  - Assess/evaluate cause of self-care deficits   -  Assess range of motion  - Assess patient's mobility  - Assess patient's need for assistive devices and provide as appropriate  - Encourage maximum independence but intervene and supervise when necessary  - Involve family in performance of ADLs  - Assess for home care needs following discharge   - Consider OT consult to assist with ADL evaluation and planning for discharge  - Provide patient education as appropriate  Outcome: Progressing     Problem: MUSCULOSKELETAL - ADULT  Goal: Maintain proper alignment of affected body part  Description: INTERVENTIONS:  - Support, maintain and protect limb and body alignment  - Provide patient/ family with appropriate education  Outcome: Progressing     Problem: HEMATOLOGIC - ADULT  Goal: Maintains hematologic stability  Description: INTERVENTIONS  - Assess for signs and symptoms of bleeding or hemorrhage  - Monitor labs  - Administer supportive blood products/factors as ordered and appropriate  Outcome: Progressing     Problem: Knowledge Deficit  Goal: Patient/family/caregiver demonstrates understanding of disease process, treatment plan, medications, and discharge instructions  Description: Complete learning assessment and assess knowledge base.  Interventions:  - Provide teaching at level of understanding  - Provide teaching via preferred learning methods  Outcome: Progressing     Problem: DISCHARGE PLANNING  Goal: Discharge to home or other facility with appropriate resources  Description: INTERVENTIONS:  - Identify barriers to discharge w/patient and caregiver  - Arrange for needed discharge resources and transportation as appropriate  - Identify discharge learning needs (meds, wound care, etc.)  - Arrange for interpretive services to assist at discharge as needed  - Refer to Case Management Department for coordinating discharge planning if the patient needs post-hospital services based on physician/advanced practitioner order or complex needs related to functional status,  cognitive ability, or social support system  Outcome: Progressing

## 2025-05-19 NOTE — CONSULTS
Consultation - Neuropsychology/Psychology Department  Mathew Rico 74 y.o. male MRN: [id number]  Unit/Bed#: [facility] 820-01 Encounter: [id number]        Reason for Consultation:  Mathew Rico is a 74 y.o. year old male who was referred for a Neuropsychological Exam to assess cognitive functioning and comment on capacity to make informed medical decisions      History of Present Illness Sepsis and change in mental status    Physician Requesting Consult: Kenia Basurto DO    PROBLEM LIST:  Problem List[1]      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HCC)     Bladder infection     Bronchitis     Constipation     Cough     Diabetes (HCC)     Diabetes mellitus (HCC)     Dizziness     Dysfunction of eustachian tube     Erectile dysfunction of non-organic origin     Fatigue     Glaucoma     Hiatal hernia     Hypertension     Imbalance     Leg muscle spasm     Leukocytosis 04/01/2024    MS (multiple sclerosis) (HCC)     Multiple sclerosis (HCC)     Nausea and vomiting 02/16/2025    Nephrolithiasis     Neurogenic bladder     No natural teeth     Sinus pain     Spinal stenosis     Strain of thoracic region     Stroke ([facility]ly [facility])     Suprapubic catheter ([facility])      Past Surgical History:   Procedure Laterality Date    APPENDECTOMY      BRAIN SURGERY      Coil placed in aneurysm    CEREBRAL ANEURYSM REPAIR      CYSTOSCOPY      CYSTOSCOPY      CYSTOSCOPY  06/11/2018    CYSTOSCOPY  01/15/2021    EYE SURGERY      transscleral cyclophotocoagulation noncontact YAG laser    IR SUPRAPUBIC CATHETER CHECK/CHANGE/REINSERTION/UPSIZE  3/28/2024    MYRINGOTOMY      with ventilation tube insertion    IL LITHOLAPAXY SMPL/SM <2.5 CM N/A 5/7/2019    Procedure: CYSTOSCOPY, holmium laser litholapaxy of bladder stones, EXCHANGE OF SP TUBE;  Surgeon: Javy Jeffries MD;  Location: [facility]  MAIN OR;  Service: Urology    SUPRAPUBIC CATHETER INSERTION       Social History   Social History     Substance and Sexual Activity   Alcohol Use Not Currently     Social History     Substance and Sexual Activity   Drug Use No     Tobacco Use History[2]  Family History:   Family History   Problem Relation Age of Onset    Heart attack Mother     Stroke Mother     Heart attack Father     Anuerysm Father         In Stomach     Aneurysm Father        Meds/Allergies   current meds:   Current Facility-Administered Medications:     acetaminophen (TYLENOL) tablet 650 mg, Q6H PRN    albuterol (PROVENTIL HFA,VENTOLIN HFA) inhaler 2 puff, Q6H PRN    amLODIPine (NORVASC) tablet 10 mg, Daily    atorvastatin (LIPITOR) tablet 80 mg, Daily With Dinner    baclofen tablet 5 mg, TID    benzonatate (TESSALON PERLES) capsule 100 mg, TID PRN    clopidogrel (PLAVIX) tablet 75 mg, Daily    enoxaparin (LOVENOX) subcutaneous injection 40 mg, Q24H CIERA    furosemide (LASIX) tablet 40 mg, Daily    gabapentin (NEURONTIN) capsule 300 mg, TID    guaiFENesin (MUCINEX) 12 hr tablet 600 mg, Q12H CIERA    [Held by provider] losartan (COZAAR) tablet 50 mg, Daily    melatonin tablet 6 mg, HS    mirtazapine (REMERON) tablet 7.5 mg, HS    pantoprazole (PROTONIX) EC tablet 40 mg, Daily    Allergies[3]      Family and Social Support:   No data recorded    Behavioral Observations: Patient was alert, UNABLE to accurately state the season of year, month, day/week, day/month, city; affect appeared appropriate to context and patient admitted to anxiety and depressed mood; patient was unable to provide medica history    Cognitive Examination    General Cognitive Functioning MMSE = [id number];     Attention/Concentration Auditory Selective Attention = Within Normal Limits;  Auditory Vigilance = Impaired; Information Processing Speed = Within Normal Limits    Frontal Systems/Executive Functioning Mental Flexibility/Cognitive Control = Impaired; Working Memory =  Impaired Abstract Reasoning = Impaired;  Generative Ability = Impaired,     Language Functioning Confrontation naming = Within Normal Limits, Phonemic Fluency = Impaired; Semantic Retrieval = Impaired; Comprehension of Complex Ideational Material = Impaired; Praxis = Within Normal Limits; Repetition = Within Normal Limits; Basic Reading = Impaired;  Following Commands = Impaired    Memory Functioning Narrative Recall - Short Delay = Impaired; Long Delay Narrative Recall = Impaired; Three word recall = Impaired 2/3    Visuo-Spatial Abilities Not Assessed    Functional Knowledge  Health & Safety Knowledge = Impaired;     Summary/Impression:  Results of Neuropsychological Exam revealed diffuse cognitive dysfunction and on a measure assessing awareness of personal health status and ability to evaluate health problems, handle medical emergencies and take safety precautions, patient performed in the IMPAIRED range of functioning. During this encounter, patient does does not appear to have capacity to make fully informed medical decisions.          [1]   Patient Active Problem List  Diagnosis    Diabetic neuropathy (HCC)    Cervical spinal stenosis    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HCC)    Benign colon polyp    Neurogenic bladder    Esophageal reflux    Fatty liver    Generalized anxiety disorder    Glaucoma    Hyperlipidemia    Primary hypertension    Multiple sclerosis (HCC)    Obstructive sleep apnea    Thyroid nodule    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)    History of CVA (cerebrovascular accident)    Bladder stones    Bladder neck contracture    Pancreatic mass    Pancreatic lesion    Excessive daytime sleepiness    Shortness of breath    Hyponatremia    Chronic diastolic congestive heart failure (HCC)    Constipation    Accidental fall from chair    Fall    Weakness    Stercoral colitis    Generalized weakness    Acute encephalopathy    GERSON on CPAP    Fall    Primary hypertension    Type 2 diabetes mellitus  without complication, without long-term current use of insulin (HCC)    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HCC)    Multiple sclerosis (HCC)    Urinary retention    Neck pain    Vitamin B deficiency    Chest pain    Symptoms of upper respiratory infection (URI)    Blurred vision, bilateral    Dizziness    Pruritus    Asymptomatic bacteriuria    Pain of left hip    Left leg pain    Bladder wall thickening    History of stroke    Urinary obstruction    Mild protein-calorie malnutrition (HCC)    Sepsis (HCC)    ARMANDO (acute kidney injury) (HCC)    COPD (chronic obstructive pulmonary disease) (HCC)    Pneumonia    Urinary tract infection associated with cystostomy catheter  (HCC)   [2]   Social History  Tobacco Use   Smoking Status Former    Current packs/day: 0.00    Average packs/day: 0.5 packs/day for 31.0 years (15.5 ttl pk-yrs)    Types: Cigarettes    Start date:     Quit date:     Years since quittin.4   Smokeless Tobacco Not on file   [3]   Allergies  Allergen Reactions    Cephalexin Rash    Cephalexin Rash

## 2025-05-20 ENCOUNTER — TELEPHONE (OUTPATIENT)
Dept: PULMONOLOGY | Facility: CLINIC | Age: 75
End: 2025-05-20

## 2025-05-20 PROBLEM — F43.20 ADJUSTMENT DISORDER: Status: ACTIVE | Noted: 2025-05-20

## 2025-05-20 LAB
ANION GAP SERPL CALCULATED.3IONS-SCNC: 8 MMOL/L (ref 4–13)
BASOPHILS # BLD AUTO: 0.12 THOUSANDS/ÂΜL (ref 0–0.1)
BASOPHILS NFR BLD AUTO: 1 % (ref 0–1)
BUN SERPL-MCNC: 15 MG/DL (ref 5–25)
CALCIUM SERPL-MCNC: 9.5 MG/DL (ref 8.4–10.2)
CHLORIDE SERPL-SCNC: 100 MMOL/L (ref 96–108)
CO2 SERPL-SCNC: 26 MMOL/L (ref 21–32)
CREAT SERPL-MCNC: 0.91 MG/DL (ref 0.6–1.3)
EOSINOPHIL # BLD AUTO: 0.45 THOUSAND/ÂΜL (ref 0–0.61)
EOSINOPHIL NFR BLD AUTO: 4 % (ref 0–6)
ERYTHROCYTE [DISTWIDTH] IN BLOOD BY AUTOMATED COUNT: 16.6 % (ref 11.6–15.1)
GFR SERPL CREATININE-BSD FRML MDRD: 82 ML/MIN/1.73SQ M
GLUCOSE SERPL-MCNC: 136 MG/DL (ref 65–140)
HCT VFR BLD AUTO: 38 % (ref 36.5–49.3)
HGB BLD-MCNC: 12.2 G/DL (ref 12–17)
IMM GRANULOCYTES # BLD AUTO: 0.09 THOUSAND/UL (ref 0–0.2)
IMM GRANULOCYTES NFR BLD AUTO: 1 % (ref 0–2)
LYMPHOCYTES # BLD AUTO: 4.24 THOUSANDS/ÂΜL (ref 0.6–4.47)
LYMPHOCYTES NFR BLD AUTO: 37 % (ref 14–44)
MCH RBC QN AUTO: 26.5 PG (ref 26.8–34.3)
MCHC RBC AUTO-ENTMCNC: 32.1 G/DL (ref 31.4–37.4)
MCV RBC AUTO: 82 FL (ref 82–98)
MONOCYTES # BLD AUTO: 0.78 THOUSAND/ÂΜL (ref 0.17–1.22)
MONOCYTES NFR BLD AUTO: 7 % (ref 4–12)
NEUTROPHILS # BLD AUTO: 5.76 THOUSANDS/ÂΜL (ref 1.85–7.62)
NEUTS SEG NFR BLD AUTO: 50 % (ref 43–75)
NRBC BLD AUTO-RTO: 0 /100 WBCS
PLATELET # BLD AUTO: 479 THOUSANDS/UL (ref 149–390)
PMV BLD AUTO: 8.2 FL (ref 8.9–12.7)
POTASSIUM SERPL-SCNC: 3.7 MMOL/L (ref 3.5–5.3)
RBC # BLD AUTO: 4.61 MILLION/UL (ref 3.88–5.62)
SODIUM SERPL-SCNC: 134 MMOL/L (ref 135–147)
WBC # BLD AUTO: 11.44 THOUSAND/UL (ref 4.31–10.16)

## 2025-05-20 PROCEDURE — 99232 SBSQ HOSP IP/OBS MODERATE 35: CPT | Performed by: INTERNAL MEDICINE

## 2025-05-20 PROCEDURE — 80048 BASIC METABOLIC PNL TOTAL CA: CPT | Performed by: STUDENT IN AN ORGANIZED HEALTH CARE EDUCATION/TRAINING PROGRAM

## 2025-05-20 PROCEDURE — 85025 COMPLETE CBC W/AUTO DIFF WBC: CPT | Performed by: STUDENT IN AN ORGANIZED HEALTH CARE EDUCATION/TRAINING PROGRAM

## 2025-05-20 RX ADMIN — Medication 6 MG: at 21:55

## 2025-05-20 RX ADMIN — GUAIFENESIN 600 MG: 600 TABLET, EXTENDED RELEASE ORAL at 11:56

## 2025-05-20 RX ADMIN — PANTOPRAZOLE SODIUM 40 MG: 40 TABLET, DELAYED RELEASE ORAL at 11:56

## 2025-05-20 RX ADMIN — MIRTAZAPINE 7.5 MG: 15 TABLET, FILM COATED ORAL at 21:55

## 2025-05-20 RX ADMIN — ENOXAPARIN SODIUM 40 MG: 40 INJECTION SUBCUTANEOUS at 11:56

## 2025-05-20 RX ADMIN — BACLOFEN 5 MG: 10 TABLET ORAL at 17:49

## 2025-05-20 RX ADMIN — ATORVASTATIN CALCIUM 80 MG: 80 TABLET, FILM COATED ORAL at 17:48

## 2025-05-20 RX ADMIN — GABAPENTIN 300 MG: 300 CAPSULE ORAL at 21:55

## 2025-05-20 RX ADMIN — BACLOFEN 5 MG: 10 TABLET ORAL at 11:56

## 2025-05-20 RX ADMIN — AMLODIPINE BESYLATE 10 MG: 10 TABLET ORAL at 11:56

## 2025-05-20 RX ADMIN — FUROSEMIDE 40 MG: 40 TABLET ORAL at 11:56

## 2025-05-20 RX ADMIN — BACLOFEN 5 MG: 10 TABLET ORAL at 21:54

## 2025-05-20 RX ADMIN — CLOPIDOGREL BISULFATE 75 MG: 75 TABLET, FILM COATED ORAL at 11:56

## 2025-05-20 RX ADMIN — GUAIFENESIN 600 MG: 600 TABLET, EXTENDED RELEASE ORAL at 21:55

## 2025-05-20 RX ADMIN — GABAPENTIN 300 MG: 300 CAPSULE ORAL at 11:56

## 2025-05-20 RX ADMIN — GABAPENTIN 300 MG: 300 CAPSULE ORAL at 17:49

## 2025-05-20 NOTE — ASSESSMENT & PLAN NOTE
Lab Results   Component Value Date    HGBA1C 6.3 (H) 02/10/2025       Blood Sugar Average: Last 72 hrs:    Home medications: Empagliflozin, Januvia, and metformin  Monitor Accu-Cheks  Avoid hypoglycemia

## 2025-05-20 NOTE — ASSESSMENT & PLAN NOTE
Wt Readings from Last 3 Encounters:   05/12/25 65 kg (143 lb 6.4 oz)   02/11/25 64.1 kg (141 lb 5 oz)   01/20/25 64.1 kg (141 lb 5 oz)     Continue Lasix 40 mg p.o. daily  Monitor volume status

## 2025-05-20 NOTE — ASSESSMENT & PLAN NOTE
Wt Readings from Last 3 Encounters:   05/12/25 65 kg (143 lb 6.4 oz)   02/11/25 64.1 kg (141 lb 5 oz)   01/20/25 64.1 kg (141 lb 5 oz)

## 2025-05-20 NOTE — ASSESSMENT & PLAN NOTE
74-year-old male with past medical history of hypertension, hyperlipidemia and diabetes, COPD, CVA, CHF, MS with neurogenic bladder and chronic Cage presenting with change in mental status And cough.    As evidenced by leukocytosis (WBC 18), tachycardia, tachypnea, elevated lactate 3.8   CT chest abdomen pelvis with contrast 05/12/2025: Possible aspiration pneumonia, Marked bladder wall thickening possible cystitis.  UCx with MSSA  5/12 BCx negative thus far  Legionella and urine strep antigen negative  Suspect related to aspiration pneumonia, possible cystitis  Completed 5 days of IV antibiotics with Zosyn/ceftriaxone  Urology was consulted for catheter exchange: No catheter exchange during this time  Sepsis resolved  Disposition planning case management following

## 2025-05-20 NOTE — ASSESSMENT & PLAN NOTE
Blood pressures reviewed, acceptable  Continue amlodipine 10 mg daily  Continue Lasix 40 mg once daily with BP parameters  Hold losartan 50 mg once daily, consider resuming at discharge  Monitor blood pressures  Avoid hypotension

## 2025-05-20 NOTE — ASSESSMENT & PLAN NOTE
Home meds: Symbicort twice daily  As per pulmonology: Started on Xopenox and atrovent.  Stopped Symbicort as it could increase risk of pneumonia  Consider LAMA if needed daily inhaler  Pulmonary inputs noted

## 2025-05-20 NOTE — ASSESSMENT & PLAN NOTE
"Lab Results   Component Value Date    HGBA1C 6.3 (H) 02/10/2025       No results for input(s): \"POCGLU\" in the last 72 hours.    Blood Sugar Average: Last 72 hrs:      "

## 2025-05-20 NOTE — CASE MANAGEMENT
Case Management Discharge Planning Note    Patient name Mathew Rico  Location [facility] 820-01 MRN [id number]  : 1950 Date 2025       Current Admission Date: 2025  Current Admission Diagnosis:Sepsis (Formerly [facility])   Patient Active Problem List    Diagnosis Date Noted    Urinary tract infection associated with cystostomy catheter  (Formerly [facility]) 2025    ARMANDO (acute kidney injury) ([facility]ly [facility]) 2025    COPD (chronic obstructive pulmonary disease) ([facility]ly [facility]) 2025    Pneumonia 2025    Sepsis (Formerly [facility]) 2025    Mild protein-calorie malnutrition ([facility]ly [facility]) 2025    Urinary obstruction 2025    History of stroke 2024    Bladder wall thickening 2024    Pain of left hip 2024    Left leg pain 2024    Dizziness 2024    Pruritus 2024    Asymptomatic bacteriuria 2024    Blurred vision, bilateral 2024    Symptoms of upper respiratory infection (URI) 06/15/2024    Chest pain 2024    Acute encephalopathy 2024    GERSON on CPAP 2024    Fall 2024    Primary hypertension 2024    Type 2 diabetes mellitus without complication, without long-term current use of insulin ([facility]) 2024    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HCC) 2024    Multiple sclerosis ([facility]) 2024    Urinary retention 2024    Neck pain 2024    Vitamin B deficiency 2024    Generalized weakness 2024    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    Fall 2023    Weakness 2023    Accidental fall from chair 2023    Constipation 2023    Chronic diastolic congestive heart failure (HCC) 2023    Hyponatremia 2023    Excessive daytime sleepiness 2022    Shortness of breath 2022    Pancreatic mass 2020    Pancreatic lesion 2020    Bladder stones 2019    Bladder neck contracture 2019    History of CVA (cerebrovascular accident) 10/21/2018    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HCC) 2017    Diabetic  "neuropathy (HCC) 11/07/2016    Neurogenic bladder 05/16/2016    Thyroid nodule 09/02/2015    Cervical spinal stenosis 12/11/2014    Generalized anxiety disorder 07/13/2014    Obstructive sleep apnea 01/04/2013    Benign colon polyp 06/19/2012    Esophageal reflux 06/19/2012    Fatty liver 06/19/2012    Glaucoma 06/19/2012    Hyperlipidemia 06/19/2012    Multiple sclerosis (HCC) 06/19/2012    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) 06/19/2012    Primary hypertension 06/11/2012      LOS (days): 8  Geometric Mean LOS (GMLOS) (days): 4.9  Days to GMLOS:-2.7     OBJECTIVE:  Risk of Unplanned Readmission Score: 44.27         Current admission status: Inpatient   Preferred Pharmacy:   [facility]/pharmacy #[id number] - [place] - [address]  [address] [address]  Phone: [phone] Fax: [phone]    [place], [place] - [address]  [facility] [address]  [facility] [address]  Phone: [phone] Fax: [phone]    [facility] [place], [place] - [address]  [facility],  [address]  [facility],  First [address] [facility] [address]  Phone: [phone] Fax: [phone]     [facility] [place], [place] - [address] [place] [address]  Phone: [phone] Fax: [phone]    Primary Care Provider: Carolina Kumari MD    Primary Insurance: [employer] [facility]  Secondary Insurance:     DISCHARGE DETAILS:              Noted pt does not have capacity, will return to facility  Spoke to Arina @ [employer]. Per Arina, there are no other facilities that patient can be transferred to for LTC. While  contracts with [facility], they state that their providers are not housed there, therefore \" it is harder to manage care\" and would need to remain at the facility he is at    Given ongoing expression of SI, psych consulted to assess for safety prior to returning to facility                        "

## 2025-05-20 NOTE — CONSULTS
"Consultation - Behavioral Health   Mathew Rico 74 y.o. male MRN: [id number]  [facility] Encounter: [id number]    Assessment & Plan  Adjustment disorder  Mathew Rico is a 74 y.o. male with past psychiatric history of BREANA, and past medical history of hypertension, hyperlipidemia, diabetes, COPD, CVA, CHF, MS with neurogenic bladder and chronic Cage, who was admitted to [facility] on 05/12/2025 for change in mental status and new cough. Psychiatric consultation was requested for clearance for placement.     On initial psychiatric evaluation, Mathew was calm and cooperative with interview. Patient reports mood is \"alright.\" Patient denies past or current symptoms of anxiety or depression, although it was noted in his chart by previous providers. On interview, patient reports he is living with his sister and brother, but per , patient was admitted from a facility in [facility].     Patient reports difficulty with falling asleep and Melatonin 6 mg daily at bedtime was started on hospital admission. Patient was also restarted on Mirtazapine 7.5 mg daily at bedtime for MDD by primary team.     On Neuropsychological Exam by Dr. Carter yesterday 05/19/2025, it was determined that patient does not appear to have capacity to make fully informed medical decisions.  At this time he is adamantly denying any suicidal or homicidal ideation and does not appear acutely psychotic or disorganized although does lack capacity.      - No psychiatric barriers to placement at this time  - Patient does not require inpatient psychiatric admission  - Psychiatry to sign off at this time.  Please reach out as necessary  Sepsis (HCC)    Hyperlipidemia    Primary hypertension    Multiple sclerosis (HCC)    Chronic diastolic congestive heart failure (HCC)  Wt Readings from Last 3 Encounters:   05/12/25 65 kg (143 lb 6.4 oz)   02/11/25 64.1 kg (141 lb 5 oz)   01/20/25 64.1 kg (141 lb 5 oz)             GERSON on " "CPAP    Type 2 diabetes mellitus without complication, without long-term current use of insulin ([facility])  Lab Results   Component Value Date    HGBA1C 6.3 (H) 02/10/2025       No results for input(s): \"POCGLU\" in the last 72 hours.    Blood Sugar Average: Last 72 hrs:      ARMANDO (acute kidney injury) ([facility])    COPD (chronic obstructive pulmonary disease) ([facility])    Pneumonia    Urinary tract infection associated with cystostomy catheter  ([facility])         Diagnoses, available treatment options, alternatives to treatment, and risks vs. benefits of current psychiatric treatment plan were discussed with the patient.  Prior records were reviewed in [facility].  The case was discussed with the primary team.    Scheduled medications:  Current Facility-Administered Medications   Medication Dose Route Frequency Provider Last Rate    acetaminophen  650 mg Oral Q6H PRN Alfredo Abdi MD      albuterol  2 puff Inhalation Q6H PRN Kenia Basurto,       amLODIPine  10 mg Oral Daily Nadia Galvan MD      atorvastatin  80 mg Oral Daily With Dinner Alfredo Abdi MD      baclofen  5 mg Oral TID Alfredo Abdi MD      benzonatate  100 mg Oral TID PRN Kenia Basurto,       clopidogrel  75 mg Oral Daily Alfredo Abdi MD      enoxaparin  40 mg Subcutaneous Q24H [facility] Nadia Galvan MD      furosemide  40 mg Oral Daily Kenia Basurto, DO      gabapentin  300 mg Oral TID Alfredo Abdi MD      guaiFENesin  600 mg Oral Q12H [facility] Kenia Basurto DO      [Held by provider] losartan  50 mg Oral Daily Alfredo Abdi MD      melatonin  6 mg Oral HS SHA Marcus      mirtazapine  7.5 mg Oral HS Alfredo Abdi MD      pantoprazole  40 mg Oral Daily Alfredo Abdi MD          PRN:    acetaminophen    albuterol    benzonatate    Chief Complaint: impaired functioning    History of Present Illness   Physician Requesting Consult: Zan Landaverde, *  Reason for " "Consult / Principal Problem: encounter for rehabilitation evaluation    Mathew Rico is a 74 y.o. male with past psychiatric history of BREANA, and past medical history of hypertension, hyperlipidemia, diabetes, COPD, CVA, CHF, MS with neurogenic bladder and chronic Cage, who was admitted to [facility] on 05/12/2025 for change in mental status and new cough. Psychiatric consultation was requested for clearance for placement back at his previous residential facility.     On initial psychiatric evaluation, Mathew was calm and cooperative with interview. Patient reports mood is \"alright.\" Patient endorses sleep changes and reports it is \"hard to sleep.\" Patient denies interest in starting medication to help with sleep when asked. Patient denies past or current symptoms of anxiety or depression. Patient reports he was never evaluated by a Psychiatrist before, besides his Neuropsychological Exam by Dr. Carter yesterday. Exam from yesterday showed the patient to lack capacity for informed decision making.    Patient reports he is currently living with his sister and brother and feels safe and happy there. This however, is incorrect as review of chart and discussion w/ CM shows that pt lives at an assisted living facility. Patient reports he has enough support in his life. Patient denies access to firearms at home. Patient denies alcohol or drug use. 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 His highest level of education was some high school courses. Patient reports he is not  or  and does not have children. Patient has enjoyed fishing in the past and the largest fish he caught was a 36 inch musky.     Patient denies any acute psychiatric safety concerns denying SI HI AVH at this time. Patient demonstrates no psychiatric barriers to rehab placement at this time. All questions answered.     Psychiatric Review Of Systems:  Medication side effects: none  Sleep: yes, " decreased  Appetite: no change  Hygiene: dependent at baseline, bed bound  Anxiety Symptoms: denies  Psychotic Symptoms: denies  Depression Symptoms: denies  Manic Symptoms: denies  PTSD Symptoms: denies  Suicidal Thoughts: denies  Homicidal Thoughts: denies      Historical Information   Past Psychiatric History  Diagnoses: BREANA  Medication History: Mirtazapine 7.5 mg, Aloprazolam 0.25 mg, Buspirone 10 mg, Sertraline 25 mg  Inpatient: none  Outpatient: denies  Suicide Attempts: denies      Substance Abuse History:    Social History     Substance and Sexual Activity   Alcohol Use Not Currently     Social History     Substance and Sexual Activity   Drug Use No         I discussed substance abuse with the patient and, if pertinent, discussed risks vs benefits of decreasing frequency of use.    Family Psychiatric History:   Family History   Problem Relation Age of Onset    Heart attack Mother     Stroke Mother     Heart attack Father     Anuerysm Father         In Stomach     Aneurysm Father          Social History  Highest education: some level of high school  Currently living: facility in [facility]   Relationships: family  Children: none  Occupation: previously worked in construction  Gun Ownership: denies       Rest of social history as per below:  Social History     Socioeconomic History    Marital status: Single     Spouse name: Not on file    Number of children: Not on file    Years of education: Not on file    Highest education level: Not on file   Occupational History    Occupation:    retired   Tobacco Use    Smoking status: Former     Current packs/day: 0.00     Average packs/day: 0.5 packs/day for 31.0 years (15.5 ttl pk-yrs)     Types: Cigarettes     Start date:      Quit date:      Years since quittin.4    Smokeless tobacco: Not on file   Vaping Use    Vaping status: Never Used   Substance and Sexual Activity    Alcohol use: Not Currently    Drug use: No    Sexual activity:  Not Currently   Other Topics Concern    Not on file   Social History Narrative    ** Merged History Encounter **          Social Drivers of Health     Financial Resource Strain: Not on file   Food Insecurity: No Food Insecurity (2025)    Nursing - Inadequate Food Risk Classification     Worried About Running Out of Food in the Last Year: Never true     Ran Out of Food in the Last Year: Never true     Ran Out of Food in the Last Year: Never true   Transportation Needs: No Transportation Needs (2025)    Nursing - Transportation Risk Classification     Lack of Transportation: Not on file     Lack of Transportation: No   Physical Activity: Not on file   Stress: Not on file   Social Connections: Not on file   Intimate Partner Violence: Unknown (2025)    Nursing IPS     Feels Physically and Emotionally Safe: Not on file     Physically Hurt by Someone: Not on file     Humiliated or Emotionally Abused by Someone: Not on file     Physically Hurt by Someone: No     Hurt or Threatened by Someone: No   Housing Stability: Unknown (2025)    Nursing: Inadequate Housing Risk Classification     Has Housing: Not on file     Worried About Losing Housing: Not on file     Unable to Get Utilities: Not on file     Unable to Pay for Housing in the Last Year: No     Has Housin         Traumatic History:   Abuse: none  Other Traumatic Events: none    Medical Review Of Systems:  Review of Systems -   All other systems were reviewed and are negative    Relevant PMH/PSH:   Past Medical History:   Diagnosis Date    Acute laryngitis     Acute nonsuppurative otitis media, unspecified laterality     Arm weakness     Arthritis     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HCC)     Bladder infection     Bronchitis     Constipation     Cough     Diabetes (HCC)     Diabetes mellitus (HCC)     Dizziness     Dysfunction of eustachian tube     Erectile dysfunction of non-organic origin     Fatigue     Glaucoma     Hiatal hernia     Hypertension      "Imbalance     Leg muscle spasm     Leukocytosis 04/01/2024    MS (multiple sclerosis) ([facility])     Multiple sclerosis ([facility])     Nausea and vomiting 02/16/2025    Nephrolithiasis     Neurogenic bladder     No natural teeth     Sinus pain     Spinal stenosis     Strain of thoracic region     Stroke ([facility])     Suprapubic catheter ([facility])      Past Surgical History:   Procedure Laterality Date    APPENDECTOMY      BRAIN SURGERY      Coil placed in aneurysm    CEREBRAL ANEURYSM REPAIR      CYSTOSCOPY      CYSTOSCOPY      CYSTOSCOPY  06/11/2018    CYSTOSCOPY  01/15/2021    EYE SURGERY      transscleral cyclophotocoagulation noncontact YAG laser    IR SUPRAPUBIC CATHETER CHECK/CHANGE/REINSERTION/UPSIZE  3/28/2024    MYRINGOTOMY      with ventilation tube insertion    AR LITHOLAPAXY SMPL/SM <2.5 CM N/A 5/7/2019    Procedure: CYSTOSCOPY, holmium laser litholapaxy of bladder stones, EXCHANGE OF SP TUBE;  Surgeon: Javy Jeffries MD;  Location: [facility];  Service: Urology    SUPRAPUBIC CATHETER INSERTION           Meds/Allergies   all current active meds have been reviewed  Allergies[1]    Objective   Vital signs in last 24 hours:  Temp:  [97.2 °F (36.2 °C)-98 °F (36.7 °C)] 97.9 °F (36.6 °C)  HR:  [69-96] 69  BP: (111-131)/(59-61) 131/60  Resp:  [16-18] 16  SpO2:  [92 %-95 %] 93 %  O2 Device: None (Room air)      Intake/Output Summary (Last 24 hours) at 5/20/2025 1254  Last data filed at 5/20/2025 0544  Gross per 24 hour   Intake 280 ml   Output 2025 ml   Net -1745 ml       Mental Status Evaluation:  Appearance: casually dressed, appears consistent with stated age  Motor: no psychomotor disturbances, bed bound at baseline  Behavior: cooperative, interacts with this writer appropriately  Speech: normal rate, rhythm, and volume  Mood: \"fine\"  Affect: euthymic, normal range and intensity  Thought Process: organized, linear, and goal-oriented  Thought Content: denies auditory hallucinations, denies visual hallucinations, denies " "delusions  Risk Potential: denies suicidal ideation, plan, or intent. Denies homicidal ideation  Sensorium: Oriented to person, place, time, and situation  Cognition: cognitive ability appears at baseline but was not quantitatively tested  Consciousness: alert and awake  Attention: able to focus without difficulty  Insight: limited  Judgement: limited        Lab Results:    I have personally reviewed all pertinent laboratory/tests results.    CBC  Lab Results   Component Value Date    WBC 11.44 (H) 05/20/2025    RBC 4.61 05/20/2025    HGB 12.2 05/20/2025    HCT 38.0 05/20/2025    MCH 26.5 (L) 05/20/2025    MCV 82 05/20/2025     (H) 05/20/2025    RDW 16.6 (H) 05/20/2025       BMP  Lab Results   Component Value Date     06/22/2016    K 3.7 05/20/2025     05/20/2025    CO2 26 05/20/2025    BUN 15 05/20/2025       LFTs  Lab Results   Component Value Date    ALB 2.8 (L) 05/13/2025    TP 5.9 (L) 05/13/2025    TBILI 0.38 05/13/2025       TSH  No results found for: \"TSH\"    COVID-19  Lab Results   Component Value Date    SARSCOV2 Not Detected 12/28/2024       UDS  Lab Results   Component Value Date    BARBTUR Negative 10/22/2018    THCUR Negative 10/22/2018    METHADONEUR Negative 10/22/2018       Medical Alcohol Level  No results found for: \"ETOH\"    EKG    Encounter Date: 05/12/25   ECG 12 lead   Result Value    Ventricular Rate 98    Atrial Rate 98    AR Interval 156    QRSD Interval 90    QT Interval 366    QTC Interval 467    P Axis 63    QRS Axis 40    T Wave Axis 34    Narrative    Normal sinus rhythm  Cannot rule out Lateral infarct , age undetermined  Abnormal ECG  When compared with ECG of 10-Feb-2025 23:36,  No significant change was found  Confirmed by Flo Galvan ([id number]) on 5/12/2025 3:33:17 PM       Code Status: Level 1 - Full Code    Counseling / Coordination of Care  I have spent a total time of 45 minutes on 05/20/25 in caring for this patient including reviewing the chart, interviewing " the patient, documenting in the medical record and discussing with the primary team.    Joo Lyle MD         [1]   Allergies  Allergen Reactions    Cephalexin Rash    Cephalexin Rash

## 2025-05-20 NOTE — ASSESSMENT & PLAN NOTE
"Mathew Rico is a 74 y.o. male with past psychiatric history of BREANA, and past medical history of hypertension, hyperlipidemia, diabetes, COPD, CVA, CHF, MS with neurogenic bladder and chronic Cage, who was admitted to [facility] on 05/12/2025 for change in mental status and new cough. Psychiatric consultation was requested for clearance for placement.     On initial psychiatric evaluation, Mathew was calm and cooperative with interview. Patient reports mood is \"alright.\" Patient denies past or current symptoms of anxiety or depression, although it was noted in his chart by previous providers. On interview, patient reports he is living with his sister and brother, but per , patient was admitted from a facility in [facility].     Patient reports difficulty with falling asleep and Melatonin 6 mg daily at bedtime was started on hospital admission. Patient was also restarted on Mirtazapine 7.5 mg daily at bedtime for MDD by primary team.     On Neuropsychological Exam by Dr. Carter yesterday 05/19/2025, it was determined that patient does not appear to have capacity to make fully informed medical decisions.  At this time he is adamantly denying any suicidal or homicidal ideation and does not appear acutely psychotic or disorganized although does lack capacity.      - No psychiatric barriers to placement at this time  - Patient does not require inpatient psychiatric admission  - Psychiatry to sign off at this time.  Please reach out as necessary  "

## 2025-05-20 NOTE — ASSESSMENT & PLAN NOTE
Baseline creatinine 0.6-0.9.  POA creatinine 1.48  ARMANDO resolved  Losartan presently on hold  Monitor kidney function  Avoid nephrotoxins

## 2025-05-20 NOTE — ASSESSMENT & PLAN NOTE
LLL pneumonia, possible aspiration related  Completed 5-day course of Zosyn/ceftriaxone  Aspiration precautions

## 2025-05-20 NOTE — PROGRESS NOTES
Progress Note - Hospitalist   Name: Mathew Rico 74 y.o. male I MRN: [id number]  Unit/Bed#: [facility] 820-01 I Date of Admission: 5/12/2025   Date of Service: 5/20/2025 I Hospital Day: 8     Assessment & Plan  Sepsis (HCC)  74-year-old male with past medical history of hypertension, hyperlipidemia and diabetes, COPD, CVA, CHF, MS with neurogenic bladder and chronic Cage presenting with change in mental status And cough.    As evidenced by leukocytosis (WBC 18), tachycardia, tachypnea, elevated lactate 3.8   CT chest abdomen pelvis with contrast 05/12/2025: Possible aspiration pneumonia, Marked bladder wall thickening possible cystitis.  UCx with MSSA  5/12 BCx negative thus far  Legionella and urine strep antigen negative  Suspect related to aspiration pneumonia, possible cystitis  Completed 5 days of IV antibiotics with Zosyn/ceftriaxone  Urology was consulted for catheter exchange: No catheter exchange during this time  Sepsis resolved  Disposition planning case management following  Pneumonia  LLL pneumonia, possible aspiration related  Completed 5-day course of Zosyn/ceftriaxone  Aspiration precautions    Urinary tract infection associated with cystostomy catheter  ([facility])  UCx with MSSA  Completed 5 days of IV antibiotics with Zosyn/ceftriaxone  Type 2 diabetes mellitus without complication, without long-term current use of insulin ([facility])  Lab Results   Component Value Date    HGBA1C 6.3 (H) 02/10/2025       Blood Sugar Average: Last 72 hrs:    Home medications: Empagliflozin, Januvia, and metformin  Monitor Accu-Cheks  Avoid hypoglycemia  Primary hypertension  Blood pressures reviewed, acceptable  Continue amlodipine 10 mg daily  Continue Lasix 40 mg once daily with BP parameters  Hold losartan 50 mg once daily, consider resuming at discharge  Monitor blood pressures  Avoid hypotension  GERSON on CPAP  CPAP at night  Hyperlipidemia  Continue atorvastatin 80 mg daily chest  Chronic diastolic congestive heart failure  ([facility])  Wt Readings from Last 3 Encounters:   05/12/25 65 kg (143 lb 6.4 oz)   02/11/25 64.1 kg (141 lb 5 oz)   01/20/25 64.1 kg (141 lb 5 oz)     Continue Lasix 40 mg p.o. daily  Monitor volume status  ARMANDO (acute kidney injury) ([facility])  Baseline creatinine 0.6-0.9.  POA creatinine 1.48  ARMANDO resolved  Losartan presently on hold  Monitor kidney function  Avoid nephrotoxins    COPD (chronic obstructive pulmonary disease) ([facility])  Home meds: Symbicort twice daily  As per pulmonology: Started on Xopenox and atrovent.  Stopped Symbicort as it could increase risk of pneumonia  Consider LAMA if needed daily inhaler  Pulmonary inputs noted  Multiple sclerosis ([facility])  Outpatient follow-up with neurology  Adjustment disorder  Psychiatry inputs noted        VTE Pharmacologic Prophylaxis: VTE Score: 6 High Risk (Score >/= 5) - Pharmacological DVT Prophylaxis Ordered: enoxaparin (Lovenox). Sequential Compression Devices Ordered.    Mobility:   Basic Mobility Inpatient Raw Score: 8  -HLM Goal: 3: Sit at edge of bed  JH-HLM Achieved: 2: Bed activities/Dependent transfer  JH-HLM Goal NOT achieved. Continue with multidisciplinary rounding and encourage appropriate mobility to improve upon -HLM goals.    Patient Centered Rounds: I performed bedside rounds with nursing staff today.   Discussions with Specialists or Other Care Team Provider: Psychiatry, case management    Education and Discussions with Family / Patient: Patient, call left a message for brother Guzman.     Current Length of Stay: 8 day(s)  Current Patient Status: Inpatient   Certification Statement: Patient awaiting placement discussed with case management  Discharge Plan: Disposition planning case management following    Code Status: Level 1 - Full Code    Subjective     Comfortably in bed  Reports feeling okay  History chart labs medications reviewed    Objective :  Temp:  [97.2 °F (36.2 °C)-98 °F (36.7 °C)] 97.9 °F (36.6 °C)  HR:  [69-85] 69  BP: (111-131)/(59-60)  131/60  SpO2:  [93 %-94 %] 93 %  O2 Device: None (Room air)    Body mass index is 24.61 kg/m².     Input and Output Summary (last 24 hours):     Intake/Output Summary (Last 24 hours) at 5/20/2025 1508  Last data filed at 5/20/2025 1502  Gross per 24 hour   Intake 640 ml   Output 2675 ml   Net -2035 ml       Physical Exam    Comfortably in bed  Neck supple  Lungs diminished breath sounds  Heart sounds S1-S2 noted  Abdomen soft  Awake follows commands  No Edema  No rash    Lines/Drains:  Lines/Drains/Airways       Active Status       Name Placement date Placement time Site Days    Suprapubic Catheter 20 Fr. 04/29/25  1100  --  21                            Lab Results: I have reviewed the following results:   Results from last 7 days   Lab Units 05/20/25  0449   WBC Thousand/uL 11.44*   HEMOGLOBIN g/dL 12.2   HEMATOCRIT % 38.0   PLATELETS Thousands/uL 479*   SEGS PCT % 50   LYMPHO PCT % 37   MONO PCT % 7   EOS PCT % 4     Results from last 7 days   Lab Units 05/20/25  0449   SODIUM mmol/L 134*   POTASSIUM mmol/L 3.7   CHLORIDE mmol/L 100   CO2 mmol/L 26   BUN mg/dL 15   CREATININE mg/dL 0.91   ANION GAP mmol/L 8   CALCIUM mg/dL 9.5   GLUCOSE RANDOM mg/dL 136                 Results from last 7 days   Lab Units 05/18/25  0526 05/16/25  0524 05/15/25  0547   PROCALCITONIN ng/ml 0.07 0.09 0.10       Recent Cultures (last 7 days):   Results from last 7 days   Lab Units 05/13/25  1656   LEGIONELLA URINARY ANTIGEN  Negative       Imaging Results Review: I personally reviewed the following image studies/reports in PACS and discussed pertinent findings with Radiology: CT chest and CT abdomen/pelvis. My interpretation of the radiology images/reports is: Lab results reviewed.  Other Study Results Review: Other studies reviewed include: Culture studies reviewed    Last 24 Hours Medication List:     Current Facility-Administered Medications:     acetaminophen (TYLENOL) tablet 650 mg, Q6H PRN    albuterol (PROVENTIL HFA,VENTOLIN  HFA) inhaler 2 puff, Q6H PRN    amLODIPine (NORVASC) tablet 10 mg, Daily    atorvastatin (LIPITOR) tablet 80 mg, Daily With Dinner    baclofen tablet 5 mg, TID    benzonatate (TESSALON PERLES) capsule 100 mg, TID PRN    clopidogrel (PLAVIX) tablet 75 mg, Daily    enoxaparin (LOVENOX) subcutaneous injection 40 mg, Q24H CIERA    furosemide (LASIX) tablet 40 mg, Daily    gabapentin (NEURONTIN) capsule 300 mg, TID    guaiFENesin (MUCINEX) 12 hr tablet 600 mg, Q12H CIERA    [Held by provider] losartan (COZAAR) tablet 50 mg, Daily    melatonin tablet 6 mg, HS    mirtazapine (REMERON) tablet 7.5 mg, HS    pantoprazole (PROTONIX) EC tablet 40 mg, Daily    Administrative Statements   Today, Patient Was Seen By: Zan Landaverde MD  I have spent a total time of 32 minutes in caring for this patient on the day of the visit/encounter including Diagnostic results, Risks and benefits of tx options, Instructions for management, Patient and family education, Importance of tx compliance, Risk factor reductions, Impressions, Counseling / Coordination of care, Documenting in the medical record, Reviewing/placing orders in the medical record (including tests, medications, and/or procedures), Obtaining or reviewing history  , and Communicating with other healthcare professionals .    **Please Note: This note may have been constructed using a voice recognition system.**

## 2025-05-21 VITALS
TEMPERATURE: 97.4 F | RESPIRATION RATE: 14 BRPM | DIASTOLIC BLOOD PRESSURE: 54 MMHG | OXYGEN SATURATION: 92 % | HEIGHT: 64 IN | BODY MASS INDEX: 24.48 KG/M2 | WEIGHT: 143.4 LBS | SYSTOLIC BLOOD PRESSURE: 122 MMHG | HEART RATE: 77 BPM

## 2025-05-21 PROCEDURE — 99239 HOSP IP/OBS DSCHRG MGMT >30: CPT | Performed by: INTERNAL MEDICINE

## 2025-05-21 RX ADMIN — CLOPIDOGREL BISULFATE 75 MG: 75 TABLET, FILM COATED ORAL at 08:37

## 2025-05-21 RX ADMIN — PANTOPRAZOLE SODIUM 40 MG: 40 TABLET, DELAYED RELEASE ORAL at 08:37

## 2025-05-21 RX ADMIN — BACLOFEN 5 MG: 10 TABLET ORAL at 08:37

## 2025-05-21 RX ADMIN — GABAPENTIN 300 MG: 300 CAPSULE ORAL at 08:38

## 2025-05-21 RX ADMIN — FUROSEMIDE 40 MG: 40 TABLET ORAL at 08:37

## 2025-05-21 RX ADMIN — GUAIFENESIN 600 MG: 600 TABLET, EXTENDED RELEASE ORAL at 08:37

## 2025-05-21 RX ADMIN — AMLODIPINE BESYLATE 10 MG: 10 TABLET ORAL at 08:37

## 2025-05-21 RX ADMIN — ENOXAPARIN SODIUM 40 MG: 40 INJECTION SUBCUTANEOUS at 08:37

## 2025-05-21 NOTE — DISCHARGE SUMMARY
Discharge Summary - Hospitalist   Name: Mathew Rico 74 y.o. male I MRN: [id number]  Unit/Bed#: [facility] 820-01 I Date of Admission: 5/12/2025   Date of Service: 5/21/2025 I Hospital Day: 9     Assessment & Plan  Sepsis (HCC)  74-year-old male with past medical history of hypertension, hyperlipidemia and diabetes, COPD, CVA, CHF, MS with neurogenic bladder and chronic Cage presenting with change in mental status And cough.    As evidenced by leukocytosis (WBC 18), tachycardia, tachypnea, elevated lactate 3.8   CT chest abdomen pelvis with contrast 05/12/2025: Possible aspiration pneumonia, Marked bladder wall thickening possible cystitis.  UCx with MSSA  5/12 BCx negative thus far  Legionella and urine strep antigen negative  Suspect related to aspiration pneumonia, possible cystitis  Completed 5 days of IV antibiotics with Zosyn/ceftriaxone  Urology was consulted for catheter exchange: No catheter exchange during this time  Sepsis resolved  Discussed with case management arrangements for discharge planning noted  Pneumonia  LLL pneumonia, possible aspiration related  Completed 5-day course of Zosyn/ceftriaxone  Aspiration precautions    Urinary tract infection associated with cystostomy catheter  ([facility])  UCx with MSSA  Completed 5 days of IV antibiotics with Zosyn/ceftriaxone  Type 2 diabetes mellitus without complication, without long-term current use of insulin ([facility])  Lab Results   Component Value Date    HGBA1C 6.3 (H) 02/10/2025       Blood Sugar Average: Last 72 hrs:    Home medications: Empagliflozin, Januvia, and metformin  Continue home regimen at discharge  Accu-Chek monitoring and outpatient follow-up recommended  Primary hypertension  Continue amlodipine 10 mg p.o. daily, continue losartan 50 mg p.o. daily  Low-salt diet  Ambulatory blood pressure monitoring outpatient follow-up recommended    GERSON on CPAP  CPAP at night  Hyperlipidemia  Continue atorvastatin  Chronic diastolic congestive heart failure  ([facility])  Wt Readings from Last 3 Encounters:   05/12/25 65 kg (143 lb 6.4 oz)   02/11/25 64.1 kg (141 lb 5 oz)   01/20/25 64.1 kg (141 lb 5 oz)     Continue Lasix 40 mg p.o. daily  Continue losartan  Outpatient follow-up  ARMANDO (acute kidney injury) ([facility])  Baseline creatinine 0.6-0.9.  POA creatinine 1.48  ARMANDO resolved      COPD (chronic obstructive pulmonary disease) ([facility])  Home meds: Symbicort twice daily  As per pulmonology: Started on Xopenox and atrovent.  Stopped Symbicort as it could increase risk of pneumonia  Consider LAMA if needed daily inhaler  Pulmonary inputs noted  Multiple sclerosis ([facility])  Outpatient follow-up with neurology  Adjustment disorder  Psychiatry inputs noted             Discharge Summary - [facility]    Patient Information: Mathew Rico 74 y.o. male MRN: [id number]  Unit/Bed#: [facility] 820-01 Encounter: [id number]    Discharging Physician / Practitioner: Zan Landaverde MD  PCP: Carolina Kumari MD  Admission Date: 5/12/2025  Discharge Date: 05/21/25    Disposition:     Home    Reason for Admission: Sepsis      Discharge Diagnoses:     Principal Problem:    Sepsis ([facility])  Active Problems:    Hyperlipidemia    Primary hypertension    Multiple sclerosis (HCC)    Chronic diastolic congestive heart failure ([facility])    GERSON on CPAP    Type 2 diabetes mellitus without complication, without long-term current use of insulin ([facility])    ARMANDO (acute kidney injury) ([facility])    COPD (chronic obstructive pulmonary disease) ([facility])    Pneumonia    Urinary tract infection associated with cystostomy catheter  ([facility])    Adjustment disorder  Resolved Problems:    Nausea and vomiting      Consultations During Hospital Stay:  Psychiatry  Pulmonary  Neuropsychology    Procedures Performed:     CT chest abdomen pelvis  1) Retained secretions in the left mainstem bronchus and occlusion of the left lower lobe bronchus and its major branches, with some more distal mucous plugging in the left lower lobe.  "Left lower lobe airspace opacities, new from February 2025, likely   representing aspiration pneumonia.     2) Suprapubic Cage catheter in place with marked bladder wall thickening, mucosal hyperenhancement, and perivesical fat stranding indicative of cystitis. No evidence of pyelonephritis.     3) Trace ascites around the urinary bladder, likely reactive to aforementioned cystitis. No organized collections to suggest an abscess.     4) Circumferential wall thickening in the mid and lower rectum, which may indicate proctitis.     5) No other acute abdominal or pelvic pathology.      Hospital Course:     Mathew Rico is a 74 y.o. male patient who originally presented to the hospital on 5/12/2025 due to sepsis.  Patient was admitted, sepsis likely due to pneumonia versus UTI.  He received and completed antibiotic course during his stay in the hospital.    He is clinically and symptomatically improved hemodynamically stable and is deemed ready for discharge.  Disposition planning discussed with case management, arrangements underway with case management.  Kindly review the chart for details.    Condition at Discharge: fair     Discharge Day Visit / Exam:     Subjective:      Comfortably in bed  Reports feeling okay  Agreeable to discharge plan        Vitals: Blood Pressure: 122/54 (05/21/25 0727)  Pulse: 77 (05/21/25 0727)  Temperature: (!) 97.4 °F (36.3 °C) (05/21/25 0727)  Temp Source: Oral (05/20/25 0900)  Respirations: 14 (05/21/25 0727)  Height: 5' 4\" (162.6 cm) (05/12/25 1800)  Weight - Scale: 65 kg (143 lb 6.4 oz) (05/12/25 1800)  SpO2: 92 % (05/21/25 0727)  Exam:   Physical Exam    Comfortably in bed  Neck supple  Lungs diminished breath sounds  Heart sounds S1-S2 noted  Abdomen soft  Awake follows commands  No pedal edema  No rash    Discharge instructions/Information to patient and family:   See after visit summary for information provided to patient and family.      Discharge plan discussed with the " patient, call left message for brother Guzman  Outpatient follow-up with primary care physician, psychiatry      Provisions for Follow-Up Care:  See after visit summary for information related to follow-up care and any pertinent home health orders.      Planned Readmission: no     Discharge Statement:  I spent 42 minutes discharging the patient. This time was spent on the day of discharge. I had direct contact with the patient on the day of discharge. Greater than 50% of the total time was spent examining patient, answering all patient questions, arranging and discussing plan of care with patient as well as directly providing post-discharge instructions.  Additional time then spent on discharge activities.    Discharge Medications:  See after visit summary for reconciled discharge medications provided to patient and family.      ** Please Note: This note has been constructed using a voice recognition system **

## 2025-05-21 NOTE — ASSESSMENT & PLAN NOTE
Lab Results   Component Value Date    HGBA1C 6.3 (H) 02/10/2025       Blood Sugar Average: Last 72 hrs:    Home medications: Empagliflozin, Januvia, and metformin  Continue home regimen at discharge  Accu-Chek monitoring and outpatient follow-up recommended

## 2025-05-21 NOTE — ASSESSMENT & PLAN NOTE
74-year-old male with past medical history of hypertension, hyperlipidemia and diabetes, COPD, CVA, CHF, MS with neurogenic bladder and chronic Cage presenting with change in mental status And cough.    As evidenced by leukocytosis (WBC 18), tachycardia, tachypnea, elevated lactate 3.8   CT chest abdomen pelvis with contrast 05/12/2025: Possible aspiration pneumonia, Marked bladder wall thickening possible cystitis.  UCx with MSSA  5/12 BCx negative thus far  Legionella and urine strep antigen negative  Suspect related to aspiration pneumonia, possible cystitis  Completed 5 days of IV antibiotics with Zosyn/ceftriaxone  Urology was consulted for catheter exchange: No catheter exchange during this time  Sepsis resolved  Discussed with case management arrangements for discharge planning noted

## 2025-05-21 NOTE — ASSESSMENT & PLAN NOTE
Wt Readings from Last 3 Encounters:   05/12/25 65 kg (143 lb 6.4 oz)   02/11/25 64.1 kg (141 lb 5 oz)   01/20/25 64.1 kg (141 lb 5 oz)     Continue Lasix 40 mg p.o. daily  Continue losartan  Outpatient follow-up

## 2025-05-21 NOTE — ASSESSMENT & PLAN NOTE
Continue amlodipine 10 mg p.o. daily, continue losartan 50 mg p.o. daily  Low-salt diet  Ambulatory blood pressure monitoring outpatient follow-up recommended

## 2025-05-21 NOTE — CASE MANAGEMENT
Case Management Discharge Planning Note    Patient name Mathew Rico  Location [facility] 820-01 MRN [id number]  : 1950 Date 2025       Current Admission Date: 2025  Current Admission Diagnosis:Sepsis ([facility])   Patient Active Problem List    Diagnosis Date Noted    Adjustment disorder 2025    Urinary tract infection associated with cystostomy catheter  ([facility]) 2025    ARMANDO (acute kidney injury) ([facility]) 2025    COPD (chronic obstructive pulmonary disease) ([facility]) 2025    Pneumonia 2025    Sepsis ([facility]) 2025    Mild protein-calorie malnutrition ([facility]) 2025    Urinary obstruction 2025    History of stroke 2024    Bladder wall thickening 2024    Pain of left hip 2024    Left leg pain 2024    Dizziness 2024    Pruritus 2024    Asymptomatic bacteriuria 2024    Blurred vision, bilateral 2024    Symptoms of upper respiratory infection (URI) 06/15/2024    Chest pain 2024    Acute encephalopathy 2024    GERSON on CPAP 2024    Fall 2024    Primary hypertension 2024    Type 2 diabetes mellitus without complication, without long-term current use of insulin ([facility]) 2024    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    Multiple sclerosis ([facility]) 2024    Urinary retention 2024    Neck pain 2024    Vitamin B deficiency 2024    Generalized weakness 2024    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    Fall 2023    Weakness 2023    Accidental fall from chair 2023    Constipation 2023    Chronic diastolic congestive heart failure ([facility]) 2023    Hyponatremia 2023    Excessive daytime sleepiness 2022    Shortness of breath 2022    Pancreatic mass 2020    Pancreatic lesion 2020    Bladder stones 2019    Bladder neck contracture 2019    History of CVA (cerebrovascular accident) 10/21/2018    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    Diabetic neuropathy (HCC) 11/07/2016    Neurogenic bladder 05/16/2016    Thyroid nodule 09/02/2015    Cervical spinal stenosis 12/11/2014    Generalized anxiety disorder 07/13/2014    Obstructive sleep apnea 01/04/2013    Benign colon polyp 06/19/2012    Esophageal reflux 06/19/2012    Fatty liver 06/19/2012    Glaucoma 06/19/2012    Hyperlipidemia 06/19/2012    Multiple sclerosis (HCC) 06/19/2012    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) 06/19/2012    Primary hypertension 06/11/2012      LOS (days): 9  Geometric Mean LOS (GMLOS) (days): 4.9  Days to GMLOS:-3.7     OBJECTIVE:  Risk of Unplanned Readmission Score: 44.7         Current admission status: Inpatient   Preferred Pharmacy:   [id number] - [place] - [address]  [address] [address]  Phone: [phone] Fax: [phone]    [place], [place] - [address]  [address]  [facility] [address]  Phone: [phone] Fax: [phone]    [facility] - [address]  [facility],  [address]  [facility],  [facility] [address]  Phone: [phone] Fax: [phone]     [facility].  [facility] - [address] [place], [place] [address]  Phone: [phone] Fax: [phone]    Primary Care Provider: Carolina Kumari MD    Primary Insurance: [facility]  Secondary Insurance:     DISCHARGE DETAILS:                         Accepting Facility Name, City & State : [facility]  Receiving Facility/Agency Phone Number: [phone]  Facility/Agency Fax Number: [phone]       Pt cleared by psychiatry, ready for d/c to return to [facility]  BLS arranged for 11:00AM

## 2025-05-23 NOTE — UTILIZATION REVIEW
NOTIFICATION OF ADMISSION DISCHARGE   This is a Notification of Discharge from [facility]. Please be advised that this patient has been discharge from our facility. Below you will find the admission and discharge date and time including the patient’s disposition.   UTILIZATION REVIEW CONTACT:  Utilization Review Assistants  Network Utilization Review Department  Phone: [phone] x carefully listen to the prompts. All voicemails are confidential.  Email: [email or web address]     ADMISSION INFORMATION  PRESENTATION DATE: 5/12/2025  3:07 PM  OBERVATION ADMISSION DATE: N/A  INPATIENT ADMISSION DATE: 5/12/25  5:58 PM   DISCHARGE DATE: 5/21/2025 12:53 PM   DISPOSITION:Discharged/Transferred to Long Term Care/Personal Care Home/Assisted Living    Network Utilization Review Department  ATTENTION: Please call with any questions or concerns to [phone] and carefully listen to the prompts so that you are directed to the right person. All voicemails are confidential.   For Discharge needs, contact Care Management DC Support Team at [phone] opt. [phone]  Send all requests for admission clinical reviews, approved or denied determinations and any other requests to dedicated fax number below belonging to the campus where the patient is receiving treatment. List of dedicated fax numbers for the Facilities:  FACILITY NAME UR FAX NUMBER   ADMISSION DENIALS (Administrative/Medical Necessity) [phone]   DISCHARGE SUPPORT TEAM ([facility]) [phone]   PARENT CHILD HEALTH (Maternity/NICU/Pediatrics) [phone]   [facility] [phone]   [facility] [phone]   [facility] [phone]   [facility] [phone]   [facility] [phone]   [facility] [phone]   [facility] [phone]   [facility]  Critical access hospital [phone]   [facility] [phone]   [facility] [phone]   [facility] [phone]   [facility] [place] [phone]

## 2025-06-11 ENCOUNTER — PROCEDURE VISIT (OUTPATIENT)
Dept: UROLOGY | Facility: CLINIC | Age: 75
End: 2025-06-11
Payer: MEDICARE

## 2025-06-11 VITALS
SYSTOLIC BLOOD PRESSURE: 136 MMHG | HEART RATE: 76 BPM | DIASTOLIC BLOOD PRESSURE: 60 MMHG | RESPIRATION RATE: 20 BRPM | OXYGEN SATURATION: 98 %

## 2025-06-11 DIAGNOSIS — N31.9 NEUROGENIC BLADDER: Primary | ICD-10-CM

## 2025-06-11 PROBLEM — A41.9 SEPSIS (HCC): Status: RESOLVED | Noted: 2025-05-12 | Resolved: 2025-06-11

## 2025-06-11 PROCEDURE — 51705 CHANGE OF BLADDER TUBE: CPT

## 2025-06-11 NOTE — PROGRESS NOTES
6/11/2025    Mathew Rico  1950  [id number]    Diagnosis  Chief Complaint    Neurogenic Bladder         Patient presents for SPT exchange managed by our office    Plan  Follow up in 6 weeks for next APT exchange    Procedure: Suprapubic Tube Change       Cystostomy tube change     Date/Time  6/11/2025 11:00 AM     Performed by  Litzy Hurtado RN   Authorized by  Javy Jeffries MD     [place] Protocol   Consent: Verbal consent obtained  Consent given by: patient     Procedure Details   Patient tolerance: patient tolerated the procedure well with no immediate complications               Current catheter removed without difficulty after deflation of an intact balloon. Site prepped with Hibiclens, new 20F  suprapubic spt change via aseptic technique without incident, 10 ml balloon inflated with sterile water. irrigated easily for clear return, mild spasm noted. Patient tolerated well. Attached to nighttime drainage bag.     Vitals:    06/11/25 1044   BP: 136/60   BP Location: Right arm   Patient Position: Supine   Cuff Size: Adult   Pulse: 76   Resp: 20   SpO2: 98%         Litzy Hurtado RN

## 2025-06-12 PROBLEM — J18.9 PNEUMONIA: Status: RESOLVED | Noted: 2025-05-13 | Resolved: 2025-06-12

## 2025-06-15 PROBLEM — N39.0 URINARY TRACT INFECTION ASSOCIATED WITH CYSTOSTOMY CATHETER  (HCC): Status: RESOLVED | Noted: 2025-05-16 | Resolved: 2025-06-15

## 2025-06-15 PROBLEM — T83.510A URINARY TRACT INFECTION ASSOCIATED WITH CYSTOSTOMY CATHETER  (HCC): Status: RESOLVED | Noted: 2025-05-16 | Resolved: 2025-06-15

## 2025-06-18 ENCOUNTER — OFFICE VISIT (OUTPATIENT)
Dept: PULMONOLOGY | Facility: CLINIC | Age: 75
End: 2025-06-18
Payer: MEDICARE

## 2025-06-18 VITALS
BODY MASS INDEX: 24.61 KG/M2 | OXYGEN SATURATION: 98 % | TEMPERATURE: 98 F | HEART RATE: 78 BPM | HEIGHT: 64 IN | DIASTOLIC BLOOD PRESSURE: 62 MMHG | SYSTOLIC BLOOD PRESSURE: 122 MMHG

## 2025-06-18 DIAGNOSIS — J18.9 PNEUMONIA OF LEFT LUNG DUE TO INFECTIOUS ORGANISM, UNSPECIFIED PART OF LUNG: ICD-10-CM

## 2025-06-18 DIAGNOSIS — G35 MULTIPLE SCLEROSIS (HCC): ICD-10-CM

## 2025-06-18 DIAGNOSIS — J43.9 PULMONARY EMPHYSEMA, UNSPECIFIED EMPHYSEMA TYPE (HCC): Primary | ICD-10-CM

## 2025-06-18 DIAGNOSIS — G47.33 OSA ON CPAP: ICD-10-CM

## 2025-06-18 PROCEDURE — G2211 COMPLEX E/M VISIT ADD ON: HCPCS | Performed by: NURSE PRACTITIONER

## 2025-06-18 PROCEDURE — 99214 OFFICE O/P EST MOD 30 MIN: CPT | Performed by: NURSE PRACTITIONER

## 2025-06-18 NOTE — ASSESSMENT & PLAN NOTE
Prior PFT did not demonstrate a clear pattern of obstruction and he is remote from smoking history (over 30 years ago). Following recent hospital stay Symbicort was recommended to stop due to the risk of pneumonia recurrence, particularly as his COPD was very mild. Since the hospital stay he has been doing well from a pulmonary standpoint.  He did not discontinue Symbicort. For now we will continue watchful waiting - discussed with him we may opt to change inhalers in the future.  Wish to repeat PFT - concern for neuromuscular weakness in the setting of MS which could impact his breathing  Albuterol as needed    Orders:    Complete PFT with post bronchodilator; Future

## 2025-06-18 NOTE — PROGRESS NOTES
Follow-up  Visit - Pulmonary Medicine   Name: Mathew Rico      : 1950      MRN: [id number]  Encounter Provider: SHA Solis  Encounter Date: 2025   Encounter department: [facility]  :  Assessment & Plan  Pulmonary emphysema, unspecified emphysema type (HCC)  Multiple sclerosis (HCC)  Prior PFT did not demonstrate a clear pattern of obstruction and he is remote from smoking history (over 30 years ago). Following recent hospital stay Symbicort was recommended to stop due to the risk of pneumonia recurrence, particularly as his COPD was very mild. Since the hospital stay he has been doing well from a pulmonary standpoint.  He did not discontinue Symbicort. For now we will continue watchful waiting - discussed with him we may opt to change inhalers in the future.  Wish to repeat PFT - concern for neuromuscular weakness in the setting of MS which could impact his breathing  Albuterol as needed    Orders:    Complete PFT with post bronchodilator; Future    GERSON on CPAP  He reports has not worn CPAP for years  No sleep study on file  Feels well rested, does not want to pursue this       Pneumonia of left lung due to infectious organism, unspecified part of lung  He had 5 days' antibiotic therapy in hospital; felt to be related to aspiration. Although barium swallow study did not reveal aspiration he does have a history of dysphagia.    Wish to repeat CT chest to ensure clearing  Orders:    CT chest without contrast; Future      Return in about 3 months (around 2025).    History of Present Illness   Mathew Rico is a 74 y.o. male with PMHx hypertension, hyperlipidemia and diabetes, COPD, CVA, CHF, GERSON, MS with neurogenic bladder and chronic Cage who presents for follow up after recent hospitalization 25-25. He presented with cough and change in mental status; CT imaging revealed possible aspiration pneumonia and he was admitted for sepsis   "pneumonia +- cystitis. He completed 5 days IV antibiotics in hospital with Zosyn and ceftriaxone. UC positive with MSSA. He was discharged to [facility].    He feels he is doing well. Denies cough or wheeze. No SOB. He feels he is sleeping well and has not worn CPAP for \"years\". Does not want to wear anything during sleep - hated using the machine prior. Has not needed nebulizer or albuterol HFA since his discharge.    Review of Systems  Please note that a 14-point review of systems was performed to include Constitutional, HEENT, Respiratory, CVS, GI, , Musculoskeletal, Integumentary, Neurologic, Rheumatologic, Endocrinologic, Psychiatric, Lymphatic, and Hematologic/Oncologic systems were reviewed and are negative unless otherwise stated in HPI. Positive and negative findings pertinent to this evaluation are incorporated into the history of present illness.       Medications Ordered Prior to Encounter[1]   Social History[2]     Medical History Reviewed by provider this encounter:  Tobacco  Allergies  Meds  Problems  Med Hx  Surg Hx  Fam Hx     .    Objective   /62 (BP Location: Left arm, Patient Position: Sitting, Cuff Size: Standard)   Pulse 78   Temp 98 °F (36.7 °C) (Tympanic)   Ht 5' 4\" (1.626 m)   SpO2 98%   BMI 24.61 kg/m²     Physical Exam  Constitutional:       Appearance: Normal appearance.   HENT:      Head: Normocephalic.      Nose: Nose normal.      Mouth/Throat:      Mouth: Mucous membranes are moist.      Pharynx: Oropharynx is clear.     Cardiovascular:      Rate and Rhythm: Normal rate and regular rhythm.      Pulses: Normal pulses.      Heart sounds: Normal heart sounds.   Pulmonary:      Effort: Pulmonary effort is normal.      Breath sounds: Normal breath sounds.     Musculoskeletal:         General: Normal range of motion.     Skin:     General: Skin is warm.      Capillary Refill: Capillary refill takes less than 2 seconds.     Neurological:      General: No focal deficit present. "      Mental Status: He is oriented to person, place, and time.     Psychiatric:         Mood and Affect: Mood normal.         Behavior: Behavior normal.         Diagnostic Data:  Labs: I personally reviewed the most recent laboratory data pertinent to today's visit.  Lab Results   Component Value Date    WBC 11.44 (H) 05/20/2025    HGB 12.2 05/20/2025    HCT 38.0 05/20/2025    MCV 82 05/20/2025     (H) 05/20/2025    EOSPCT 4 05/20/2025    EOSABS 0.45 05/20/2025    SEGSPCT 48 08/21/2023    MONOPCT 7 05/20/2025    MONOABS 0.69 08/21/2023     Lab Results   Component Value Date    GLUCOSE 127 03/31/2024    CALCIUM 9.5 05/20/2025     06/22/2016    K 3.7 05/20/2025    CO2 26 05/20/2025     05/20/2025    BUN 15 05/20/2025    CREATININE 0.91 05/20/2025     Lab Results   Component Value Date     (H) 09/28/2022     Lab Results   Component Value Date    ALT 6 (L) 05/13/2025    AST 13 05/13/2025    ALKPHOS 57 05/13/2025    BILITOT 0.4 06/22/2016         Radiology results:  Radiology Results Review: I have reviewed radiology reports from PACS including: CT chest and procedure reports.  CT chest abdomen pelvis w contrast 5/12/25  1) Retained secretions in the left mainstem bronchus and occlusion of the left lower lobe bronchus and its major branches, with some more distal mucous plugging in the left lower lobe. Left lower lobe airspace opacities, new from February 2025, likely representing aspiration pneumonia.     2) Suprapubic Cage catheter in place with marked bladder wall thickening, mucosal hyperenhancement, and perivesical fat stranding indicative of cystitis. No evidence of pyelonephritis.     3) Trace ascites around the urinary bladder, likely reactive to aforementioned cystitis. No organized collections to suggest an abscess.     4) Circumferential wall thickening in the mid and lower rectum, which may indicate proctitis.     5) No other acute abdominal or pelvic pathology.    VBS 5/14/25  Mild  to moderate oral dysphagia  Trace transient aspiration with thin liquids, no aspiration  Puree / level 1 diet and thin liquid        SHA Solis           [1]   Current Outpatient Medications on File Prior to Visit   Medication Sig Dispense Refill    albuterol (2.5 mg/3 mL) 0.083 % nebulizer solution Take 3 mL (2.5 mg total) by nebulization every 6 (six) hours as needed for wheezing or shortness of breath 60 mL 3    amLODIPine (NORVASC) 10 mg tablet Take 1 tablet (10 mg total) by mouth daily      atorvastatin (LIPITOR) 80 mg tablet Take 80 mg by mouth at bedtime      baclofen 10 mg tablet Take 5 mg by mouth in the morning and 5 mg in the evening and 5 mg before bedtime.      bisacodyl (DULCOLAX) 10 mg suppository Insert 1 suppository (10 mg total) into the rectum daily as needed for constipation for up to 12 doses 12 suppository 0    budesonide-formoterol (SYMBICORT) 160-4.5 mcg/act inhaler Inhale 2 puffs in the morning and 2 puffs in the evening. Rinse mouth after use.      clopidogrel (PLAVIX) 75 mg tablet Take 1 tablet (75 mg total) by mouth daily  0    gabapentin (NEURONTIN) 300 mg capsule Take 1 capsule (300 mg total) by mouth 3 (three) times a day 30 capsule 0    latanoprost (XALATAN) 0.005 % ophthalmic solution Administer 1 drop to both eyes daily at bedtime      losartan (COZAAR) 50 mg tablet Take 50 mg by mouth in the morning.      metFORMIN (GLUCOPHAGE) 1000 MG tablet Take 1,000 mg by mouth daily with breakfast      methenamine hippurate (HIPREX) 1 g tablet Take 1 tablet (1 g total) by mouth 2 (two) times a day with meals 180 tablet 3    mirtazapine (REMERON) 7.5 MG tablet Take 7.5 mg by mouth daily at bedtime      pantoprazole (PROTONIX) 40 mg tablet Take 40 mg by mouth in the morning.      polyethylene glycol (MIRALAX) 17 g packet Take 17 g by mouth 2 (two) times a day 60 each 0    propranolol (INDERAL LA) 60 mg 24 hr capsule Take 60 mg by mouth in the morning.      senna-docusate sodium (SENOKOT  S) 8.6-50 mg per tablet Take 2 tablets by mouth daily at bedtime 30 tablet 0    furosemide (LASIX) 40 mg tablet Take 40 mg by mouth daily (Patient not taking: Reported on 3/13/2025)      melatonin 3 mg Take 3 mg by mouth daily at bedtime as needed (Patient not taking: Reported on 3/13/2025)      potassium chloride (Klor-Con M10) 10 mEq tablet Take 10 mEq by mouth 2 (two) times a day (Patient not taking: Reported on 3/13/2025)      sitaGLIPtin (JANUVIA) 100 mg tablet Take 100 mg by mouth daily (Patient not taking: Reported on 3/13/2025)      [DISCONTINUED] cromolyn (NASALCHROM) 5.2 MG/ACT nasal spray 1 spray into each nostril 3 (three) times a day (Patient not taking: Reported on 3/13/2025) 13 mL 0    [DISCONTINUED] cyanocobalamin (VITAMIN B-12) 500 MCG tablet Take 1 tablet (500 mcg total) by mouth daily (Patient not taking: Reported on 3/13/2025)      [DISCONTINUED] Empagliflozin (JARDIANCE) 10 MG TABS tablet Take 10 mg by mouth every morning (Patient not taking: Reported on 3/13/2025)      [DISCONTINUED] Ergocalciferol (VITAMIN D2 PO) Take 50,000 Units by mouth once a week (Patient not taking: Reported on 2025)       No current facility-administered medications on file prior to visit.   [2]   Social History  Tobacco Use    Smoking status: Former     Current packs/day: 0.00     Average packs/day: 0.5 packs/day for 31.0 years (15.5 ttl pk-yrs)     Types: Cigarettes     Start date:      Quit date:      Years since quittin.4   Vaping Use    Vaping status: Never Used   Substance and Sexual Activity    Alcohol use: Not Currently    Drug use: No    Sexual activity: Not Currently

## 2025-06-18 NOTE — ASSESSMENT & PLAN NOTE
He reports has not worn CPAP for years  No sleep study on file  Feels well rested, does not want to pursue this

## 2025-06-30 ENCOUNTER — TELEPHONE (OUTPATIENT)
Age: 75
End: 2025-06-30

## 2025-06-30 NOTE — TELEPHONE ENCOUNTER
Patient is in [facility] now. Has abnormal labs, was getting frequent UTI's, also [facility]O please call them to get sooner appointment set up, [facility] or [facility].   Call  [phone]

## 2025-07-02 ENCOUNTER — TELEPHONE (OUTPATIENT)
Dept: NEPHROLOGY | Facility: CLINIC | Age: 75
End: 2025-07-02

## 2025-07-02 NOTE — TELEPHONE ENCOUNTER
Providing sooner appointment Wednesday 7/2 at 10:30 am with Dr. Rincon in [place].  Called patient to see if they want to reschedule    He is in rehab, not be able to come today.

## 2025-07-24 ENCOUNTER — PROCEDURE VISIT (OUTPATIENT)
Dept: UROLOGY | Facility: CLINIC | Age: 75
End: 2025-07-24
Payer: MEDICARE

## 2025-07-24 VITALS
SYSTOLIC BLOOD PRESSURE: 130 MMHG | OXYGEN SATURATION: 96 % | DIASTOLIC BLOOD PRESSURE: 60 MMHG | RESPIRATION RATE: 20 BRPM | HEART RATE: 72 BPM

## 2025-07-24 DIAGNOSIS — N31.9 NEUROGENIC BLADDER: Primary | ICD-10-CM

## 2025-07-24 PROCEDURE — 51705 CHANGE OF BLADDER TUBE: CPT | Performed by: UROLOGY

## 2025-07-24 NOTE — PROGRESS NOTES
7/24/2025    Mathew Rico  1950  [id number]    Diagnosis  Chief Complaint    Neurogenic Bladder         Patient presents for SPT change managed by our office    Plan  Follow up in 6 weeks for SPT change    Procedure: Suprapubic Tube Change       Cystostomy tube change     Date/Time  7/24/2025 11:00 AM     Performed by  Litzy Hurtado RN   Authorized by  Javy Jeffries MD     [place] Protocol   Consent: Verbal consent obtained  Consent given by: patient     Procedure Details   Patient tolerance: patient tolerated the procedure well with no immediate complications               Current catheter removed without difficulty after deflation of an intact balloon. Site prepped with Hibiclens, new 20F  suprapubic spt change via aseptic technique without incident, 10 ml balloon inflated with sterile water. irrigated easily for straw-yellow return, mild spasm noted. Patient tolerated well. Attached to nighttime drainage bag.     Vitals:    07/24/25 1053   BP: 130/60   BP Location: Left arm   Patient Position: Supine   Cuff Size: Adult   Pulse: 72   Resp: 20   SpO2: 96%         Litzy Hurtado RN

## 2025-07-30 ENCOUNTER — OFFICE VISIT (OUTPATIENT)
Dept: NEPHROLOGY | Facility: CLINIC | Age: 75
End: 2025-07-30
Payer: MEDICARE

## 2025-07-30 VITALS — OXYGEN SATURATION: 95 % | DIASTOLIC BLOOD PRESSURE: 64 MMHG | SYSTOLIC BLOOD PRESSURE: 112 MMHG | HEART RATE: 81 BPM

## 2025-07-30 DIAGNOSIS — I10 ESSENTIAL HYPERTENSION: ICD-10-CM

## 2025-07-30 DIAGNOSIS — N18.2 STAGE 2 CHRONIC KIDNEY DISEASE: Primary | ICD-10-CM

## 2025-07-30 PROCEDURE — 99204 OFFICE O/P NEW MOD 45 MIN: CPT | Performed by: INTERNAL MEDICINE

## 2025-07-30 RX ORDER — ACETAMINOPHEN 325 MG/1
650 TABLET ORAL EVERY 6 HOURS PRN
Status: ON HOLD | COMMUNITY

## 2025-08-02 ENCOUNTER — APPOINTMENT (EMERGENCY)
Dept: RADIOLOGY | Facility: HOSPITAL | Age: 75
DRG: 698 | End: 2025-08-02
Payer: MEDICARE

## 2025-08-02 ENCOUNTER — HOSPITAL ENCOUNTER (INPATIENT)
Facility: HOSPITAL | Age: 75
LOS: 3 days | Discharge: NURSING, REHAB OR LONG-TERM CARE | DRG: 698 | End: 2025-08-05
Attending: EMERGENCY MEDICINE | Admitting: INTERNAL MEDICINE
Payer: MEDICARE

## 2025-08-02 PROBLEM — R13.10 DYSPHAGIA: Status: ACTIVE | Noted: 2025-08-02

## 2025-08-02 PROBLEM — T83.010A SUPRAPUBIC CATHETER DYSFUNCTION (HCC): Status: ACTIVE | Noted: 2025-08-02

## 2025-08-03 PROBLEM — R19.7 DIARRHEA: Status: ACTIVE | Noted: 2023-06-03

## 2025-08-04 PROBLEM — N18.9 CKD (CHRONIC KIDNEY DISEASE): Status: ACTIVE | Noted: 2025-08-04

## 2025-08-08 ENCOUNTER — APPOINTMENT (EMERGENCY)
Dept: RADIOLOGY | Facility: HOSPITAL | Age: 75
DRG: 947 | End: 2025-08-08
Payer: MEDICARE

## 2025-08-08 ENCOUNTER — HOSPITAL ENCOUNTER (INPATIENT)
Facility: HOSPITAL | Age: 75
LOS: 3 days | Discharge: NURSING, REHAB OR LONG-TERM CARE | DRG: 947 | End: 2025-08-11
Attending: EMERGENCY MEDICINE | Admitting: INTERNAL MEDICINE
Payer: MEDICARE

## 2025-08-08 PROBLEM — R41.82 AMS (ALTERED MENTAL STATUS): Status: ACTIVE | Noted: 2025-08-08

## 2025-08-08 PROBLEM — T68.XXXA HYPOTHERMIA: Status: ACTIVE | Noted: 2025-08-08

## 2025-08-08 PROBLEM — Z71.89 GOALS OF CARE, COUNSELING/DISCUSSION: Status: ACTIVE | Noted: 2025-08-08

## 2025-08-08 PROBLEM — I47.29 NSVT (NONSUSTAINED VENTRICULAR TACHYCARDIA) (HCC): Status: ACTIVE | Noted: 2025-08-08

## 2025-08-09 ENCOUNTER — APPOINTMENT (INPATIENT)
Dept: RADIOLOGY | Facility: HOSPITAL | Age: 75
DRG: 947 | End: 2025-08-09
Payer: MEDICARE

## 2025-08-09 ENCOUNTER — APPOINTMENT (INPATIENT)
Dept: NON INVASIVE DIAGNOSTICS | Facility: HOSPITAL | Age: 75
DRG: 947 | End: 2025-08-09
Payer: MEDICARE

## 2025-08-09 PROBLEM — K59.00 CONSTIPATION: Status: ACTIVE | Noted: 2025-08-09

## 2025-08-10 PROBLEM — R79.89 LOW SERUM CORTISOL LEVEL: Status: ACTIVE | Noted: 2025-08-10

## 2025-08-11 ENCOUNTER — APPOINTMENT (INPATIENT)
Dept: RADIOLOGY | Facility: HOSPITAL | Age: 75
DRG: 947 | End: 2025-08-11
Attending: INTERNAL MEDICINE
Payer: MEDICARE

## 2025-08-11 ENCOUNTER — TELEPHONE (OUTPATIENT)
Age: 75
End: 2025-08-11

## (undated) DEVICE — GUIDEWIRE STRGHT TIP 0.035 IN  SOLO PLUS

## (undated) DEVICE — CATH URETERAL 5FR X 70 CM FLEX TIP POLYUR BARD

## (undated) DEVICE — SPECIMEN CONTAINER STERILE PEEL PACK

## (undated) DEVICE — GLOVE SRG BIOGEL ECLIPSE 7.5

## (undated) DEVICE — BAG URINE DRAINAGE 2000ML ANTI RFLX LF

## (undated) DEVICE — EVACUATOR BLADDER ELLIK DISP STRL

## (undated) DEVICE — GLOVE INDICATOR PI UNDERGLOVE SZ 8 BLUE

## (undated) DEVICE — CATH FOLEY 24FR 30ML 2 WAY SILICONE ELASTIMER

## (undated) DEVICE — PACK TUR

## (undated) DEVICE — LUBRICANT SURGILUBE TUBE 4 OZ  FLIP TOP

## (undated) DEVICE — BASIC SINGLE BASIN-LF: Brand: MEDLINE INDUSTRIES, INC.

## (undated) DEVICE — LASER HOLMUIUM FIBER 1000 MIC

## (undated) DEVICE — SYRINGE 10ML LL